# Patient Record
Sex: MALE | Race: BLACK OR AFRICAN AMERICAN | NOT HISPANIC OR LATINO | Employment: OTHER | URBAN - METROPOLITAN AREA
[De-identification: names, ages, dates, MRNs, and addresses within clinical notes are randomized per-mention and may not be internally consistent; named-entity substitution may affect disease eponyms.]

---

## 2017-10-31 ENCOUNTER — HISTORICAL (OUTPATIENT)
Dept: INTERNAL MEDICINE | Facility: CLINIC | Age: 62
End: 2017-10-31

## 2017-10-31 LAB
ALBUMIN SERPL-MCNC: 3.6 GM/DL (ref 3.4–5)
ALBUMIN/GLOB SERPL: 1 RATIO (ref 1–2)
ALP SERPL-CCNC: 83 UNIT/L (ref 45–117)
ALT SERPL-CCNC: 28 UNIT/L (ref 12–78)
AST SERPL-CCNC: 27 UNIT/L (ref 15–37)
BILIRUB SERPL-MCNC: 0.9 MG/DL (ref 0.2–1)
BILIRUBIN DIRECT+TOT PNL SERPL-MCNC: 0.2 MG/DL
BILIRUBIN DIRECT+TOT PNL SERPL-MCNC: 0.7 MG/DL
BUN SERPL-MCNC: 15 MG/DL (ref 7–18)
CALCIUM SERPL-MCNC: 8.7 MG/DL (ref 8.5–10.1)
CHLORIDE SERPL-SCNC: 106 MMOL/L (ref 98–107)
CO2 SERPL-SCNC: 32 MMOL/L (ref 21–32)
CREAT SERPL-MCNC: 0.9 MG/DL (ref 0.6–1.3)
GLOBULIN SER-MCNC: 3.8 GM/ML (ref 2.3–3.5)
GLUCOSE SERPL-MCNC: 92 MG/DL (ref 74–106)
POTASSIUM SERPL-SCNC: 3.2 MMOL/L (ref 3.5–5.1)
PROT SERPL-MCNC: 7.4 GM/DL (ref 6.4–8.2)
SODIUM SERPL-SCNC: 143 MMOL/L (ref 136–145)

## 2017-11-06 ENCOUNTER — HISTORICAL (OUTPATIENT)
Dept: INTERNAL MEDICINE | Facility: CLINIC | Age: 62
End: 2017-11-06

## 2017-11-06 LAB
COLOR STL: NORMAL
COLOR STL: NORMAL
CONSISTENCY STL: NORMAL
CONSISTENCY STL: NORMAL
HEMOCCULT SP1 STL QL: NEGATIVE
HEMOCCULT SP2 STL QL: NEGATIVE
POTASSIUM 24H UR-SCNC: 87 MMOL/24 HRS (ref 25–100)
POTASSIUM UR-SCNC: 30 MMOL/L

## 2017-11-07 LAB
COLOR STL: NORMAL
CONSISTENCY STL: NORMAL

## 2017-11-08 ENCOUNTER — HISTORICAL (OUTPATIENT)
Dept: CARDIOLOGY | Facility: CLINIC | Age: 62
End: 2017-11-08

## 2017-12-14 ENCOUNTER — HOSPITAL ENCOUNTER (OUTPATIENT)
Dept: MEDSURG UNIT | Facility: HOSPITAL | Age: 62
End: 2017-12-15
Attending: INTERNAL MEDICINE | Admitting: INTERNAL MEDICINE

## 2017-12-14 LAB
ABS NEUT (OLG): 2.98 X10(3)/MCL (ref 2.1–9.2)
ALBUMIN SERPL-MCNC: 3.6 GM/DL (ref 3.4–5)
ALBUMIN/GLOB SERPL: 1 RATIO (ref 1–2)
ALP SERPL-CCNC: 80 UNIT/L (ref 45–117)
ALT SERPL-CCNC: 30 UNIT/L (ref 12–78)
AST SERPL-CCNC: 23 UNIT/L (ref 15–37)
BASOPHILS # BLD AUTO: 0.05 X10(3)/MCL
BASOPHILS NFR BLD AUTO: 1 % (ref 0–1)
BILIRUB SERPL-MCNC: 0.8 MG/DL (ref 0.2–1)
BILIRUBIN DIRECT+TOT PNL SERPL-MCNC: 0.2 MG/DL
BILIRUBIN DIRECT+TOT PNL SERPL-MCNC: 0.6 MG/DL
BUN SERPL-MCNC: 17 MG/DL (ref 7–18)
CALCIUM SERPL-MCNC: 8.2 MG/DL (ref 8.5–10.1)
CHLORIDE SERPL-SCNC: 111 MMOL/L (ref 98–107)
CK MB SERPL-MCNC: 3.6 NG/ML (ref 1–3.6)
CK SERPL-CCNC: 126 UNIT/L (ref 39–308)
CO2 SERPL-SCNC: 25 MMOL/L (ref 21–32)
CREAT SERPL-MCNC: 1.1 MG/DL (ref 0.6–1.3)
EOSINOPHIL # BLD AUTO: 0.02 10*3/UL
EOSINOPHIL NFR BLD AUTO: 0 % (ref 0–5)
ERYTHROCYTE [DISTWIDTH] IN BLOOD BY AUTOMATED COUNT: 13.3 % (ref 11.5–14.5)
GLOBULIN SER-MCNC: 3.6 GM/ML (ref 2.3–3.5)
GLUCOSE SERPL-MCNC: 97 MG/DL (ref 74–106)
HCT VFR BLD AUTO: 43.5 % (ref 40–51)
HGB BLD-MCNC: 15.3 GM/DL (ref 13.5–17.5)
IMM GRANULOCYTES # BLD AUTO: 0.02 10*3/UL
IMM GRANULOCYTES NFR BLD AUTO: 0 %
INR PPP: 1.04 (ref 0.9–1.2)
LYMPHOCYTES # BLD AUTO: 1.98 X10(3)/MCL
LYMPHOCYTES NFR BLD AUTO: 35 % (ref 15–40)
MAGNESIUM SERPL-MCNC: 2 MG/DL (ref 1.8–2.4)
MCH RBC QN AUTO: 28.6 PG (ref 26–34)
MCHC RBC AUTO-ENTMCNC: 35.2 GM/DL (ref 31–37)
MCV RBC AUTO: 81.3 FL (ref 80–100)
MONOCYTES # BLD AUTO: 0.66 X10(3)/MCL
MONOCYTES NFR BLD AUTO: 12 % (ref 4–12)
NEUTROPHILS # BLD AUTO: 2.98 X10(3)/MCL
NEUTROPHILS NFR BLD AUTO: 52 X10(3)/MCL
PLATELET # BLD AUTO: 231 X10(3)/MCL (ref 130–400)
PMV BLD AUTO: 10.8 FL (ref 7.4–10.4)
POTASSIUM SERPL-SCNC: 3.4 MMOL/L (ref 3.5–5.1)
PROT SERPL-MCNC: 7.2 GM/DL (ref 6.4–8.2)
PROTHROMBIN TIME: 13.4 SECOND(S) (ref 11.9–14.4)
RBC # BLD AUTO: 5.35 X10(6)/MCL (ref 4.5–5.9)
SODIUM SERPL-SCNC: 143 MMOL/L (ref 136–145)
TROPONIN I SERPL-MCNC: 0.02 NG/ML (ref 0–0.05)
TSH SERPL-ACNC: 2.88 MIU/L (ref 0.36–3.74)
WBC # SPEC AUTO: 5.7 X10(3)/MCL (ref 4.5–11)

## 2017-12-15 LAB
ABS NEUT (OLG): 2.2 X10(3)/MCL (ref 2.1–9.2)
ALBUMIN SERPL-MCNC: 3.6 GM/DL (ref 3.4–5)
ALBUMIN/GLOB SERPL: 1 RATIO (ref 1–2)
ALP SERPL-CCNC: 80 UNIT/L (ref 45–117)
ALT SERPL-CCNC: 32 UNIT/L (ref 12–78)
AST SERPL-CCNC: 20 UNIT/L (ref 15–37)
BASOPHILS # BLD AUTO: 0.07 X10(3)/MCL
BASOPHILS NFR BLD AUTO: 2 % (ref 0–1)
BILIRUB SERPL-MCNC: 1 MG/DL (ref 0.2–1)
BILIRUBIN DIRECT+TOT PNL SERPL-MCNC: 0.3 MG/DL
BILIRUBIN DIRECT+TOT PNL SERPL-MCNC: 0.7 MG/DL
BUN SERPL-MCNC: 15 MG/DL (ref 7–18)
CALCIUM SERPL-MCNC: 8.5 MG/DL (ref 8.5–10.1)
CHLORIDE SERPL-SCNC: 106 MMOL/L (ref 98–107)
CHOLEST SERPL-MCNC: 70 MG/DL
CHOLEST/HDLC SERPL: 2.2 {RATIO} (ref 0–5)
CO2 SERPL-SCNC: 34 MMOL/L (ref 21–32)
CREAT SERPL-MCNC: 1 MG/DL (ref 0.6–1.3)
EOSINOPHIL # BLD AUTO: 0.05 10*3/UL
EOSINOPHIL NFR BLD AUTO: 1 % (ref 0–5)
ERYTHROCYTE [DISTWIDTH] IN BLOOD BY AUTOMATED COUNT: 13.5 % (ref 11.5–14.5)
GLOBULIN SER-MCNC: 3.6 GM/ML (ref 2.3–3.5)
GLUCOSE SERPL-MCNC: 87 MG/DL (ref 74–106)
HCT VFR BLD AUTO: 45.2 % (ref 40–51)
HDLC SERPL-MCNC: 32 MG/DL
HGB BLD-MCNC: 15.1 GM/DL (ref 13.5–17.5)
IMM GRANULOCYTES # BLD AUTO: 0.01 10*3/UL
IMM GRANULOCYTES NFR BLD AUTO: 0 %
LDLC SERPL CALC-MCNC: 26 MG/DL (ref 0–130)
LYMPHOCYTES # BLD AUTO: 1.99 X10(3)/MCL
LYMPHOCYTES NFR BLD AUTO: 41 % (ref 15–40)
MCH RBC QN AUTO: 27.9 PG (ref 26–34)
MCHC RBC AUTO-ENTMCNC: 33.4 GM/DL (ref 31–37)
MCV RBC AUTO: 83.5 FL (ref 80–100)
MONOCYTES # BLD AUTO: 0.49 X10(3)/MCL
MONOCYTES NFR BLD AUTO: 10 % (ref 4–12)
NEUTROPHILS # BLD AUTO: 2.2 X10(3)/MCL
NEUTROPHILS NFR BLD AUTO: 46 X10(3)/MCL
PLATELET # BLD AUTO: 207 X10(3)/MCL (ref 130–400)
PMV BLD AUTO: 10.9 FL (ref 7.4–10.4)
POTASSIUM SERPL-SCNC: 3.6 MMOL/L (ref 3.5–5.1)
PROT SERPL-MCNC: 7.2 GM/DL (ref 6.4–8.2)
RBC # BLD AUTO: 5.41 X10(6)/MCL (ref 4.5–5.9)
SODIUM SERPL-SCNC: 142 MMOL/L (ref 136–145)
TRIGL SERPL-MCNC: 62 MG/DL
VLDLC SERPL CALC-MCNC: 12 MG/DL
WBC # SPEC AUTO: 4.8 X10(3)/MCL (ref 4.5–11)

## 2018-01-16 ENCOUNTER — HISTORICAL (OUTPATIENT)
Dept: INTERNAL MEDICINE | Facility: CLINIC | Age: 63
End: 2018-01-16

## 2018-01-16 LAB
ABS NEUT (OLG): 3.28 X10(3)/MCL (ref 2.1–9.2)
ALBUMIN SERPL-MCNC: 4.3 GM/DL (ref 3.4–5)
ALBUMIN/GLOB SERPL: 1 RATIO (ref 1–2)
ALP SERPL-CCNC: 99 UNIT/L (ref 45–117)
ALT SERPL-CCNC: 26 UNIT/L (ref 12–78)
APPEARANCE, UA: CLEAR
AST SERPL-CCNC: 22 UNIT/L (ref 15–37)
BACTERIA #/AREA URNS AUTO: ABNORMAL /[HPF]
BASOPHILS # BLD AUTO: 0.05 X10(3)/MCL
BASOPHILS NFR BLD AUTO: 1 % (ref 0–1)
BILIRUB SERPL-MCNC: 1.1 MG/DL (ref 0.2–1)
BILIRUB UR QL STRIP: 0.5 MG/DL
BILIRUBIN DIRECT+TOT PNL SERPL-MCNC: 0.3 MG/DL
BILIRUBIN DIRECT+TOT PNL SERPL-MCNC: 0.8 MG/DL
BUN SERPL-MCNC: 8 MG/DL (ref 7–18)
CALCIUM SERPL-MCNC: 8.9 MG/DL (ref 8.5–10.1)
CHLORIDE SERPL-SCNC: 104 MMOL/L (ref 98–107)
CO2 SERPL-SCNC: 32 MMOL/L (ref 21–32)
COLOR UR: YELLOW
CREAT SERPL-MCNC: 0.8 MG/DL (ref 0.6–1.3)
EOSINOPHIL # BLD AUTO: 0.03 X10(3)/MCL
EOSINOPHIL NFR BLD AUTO: 1 % (ref 0–5)
ERYTHROCYTE [DISTWIDTH] IN BLOOD BY AUTOMATED COUNT: 14.2 % (ref 11.5–14.5)
GLOBULIN SER-MCNC: 4.2 GM/ML (ref 2.3–3.5)
GLUCOSE (UA): NORMAL
GLUCOSE SERPL-MCNC: 92 MG/DL (ref 74–106)
HCT VFR BLD AUTO: 43.9 % (ref 40–51)
HGB BLD-MCNC: 15.4 GM/DL (ref 13.5–17.5)
HGB UR QL STRIP: NEGATIVE
HYALINE CASTS #/AREA URNS LPF: ABNORMAL /[LPF]
IMM GRANULOCYTES # BLD AUTO: 0.02 10*3/UL
IMM GRANULOCYTES NFR BLD AUTO: 0 %
INR PPP: 0.98 (ref 0.9–1.2)
KETONES UR QL STRIP: NEGATIVE
LEUKOCYTE ESTERASE UR QL STRIP: NEGATIVE
LYMPHOCYTES # BLD AUTO: 1.6 X10(3)/MCL
LYMPHOCYTES NFR BLD AUTO: 30 % (ref 15–40)
MCH RBC QN AUTO: 28.7 PG (ref 26–34)
MCHC RBC AUTO-ENTMCNC: 35.1 GM/DL (ref 31–37)
MCV RBC AUTO: 81.8 FL (ref 80–100)
MONOCYTES # BLD AUTO: 0.43 X10(3)/MCL
MONOCYTES NFR BLD AUTO: 8 % (ref 4–12)
MUCOUS THREADS URNS QL MICRO: ABNORMAL
NEUTROPHILS # BLD AUTO: 3.28 X10(3)/MCL
NEUTROPHILS NFR BLD AUTO: 61 X10(3)/MCL
NITRITE UR QL STRIP: NEGATIVE
PH UR STRIP: 8 [PH] (ref 4.5–8)
PLATELET # BLD AUTO: 256 X10(3)/MCL (ref 130–400)
PMV BLD AUTO: 10.7 FL (ref 7.4–10.4)
POTASSIUM SERPL-SCNC: 2.9 MMOL/L (ref 3.5–5.1)
PROT SERPL-MCNC: 8.5 GM/DL (ref 6.4–8.2)
PROT UR QL STRIP: 70 MG/DL
PROTHROMBIN TIME: 12.8 SECOND(S) (ref 11.9–14.4)
RBC # BLD AUTO: 5.37 X10(6)/MCL (ref 4.5–5.9)
RBC #/AREA URNS AUTO: ABNORMAL /[HPF]
SODIUM SERPL-SCNC: 143 MMOL/L (ref 136–145)
SP GR UR STRIP: 1.02 (ref 1–1.03)
SQUAMOUS #/AREA URNS LPF: ABNORMAL /[LPF]
UROBILINOGEN UR STRIP-ACNC: 8 MG/DL
WBC # SPEC AUTO: 5.4 X10(3)/MCL (ref 4.5–11)
WBC #/AREA URNS AUTO: ABNORMAL /HPF

## 2018-01-18 LAB — FINAL CULTURE: NO GROWTH

## 2018-01-21 LAB
FINAL CULTURE: NORMAL
FINAL CULTURE: NORMAL

## 2018-01-25 ENCOUNTER — HISTORICAL (OUTPATIENT)
Dept: INTERNAL MEDICINE | Facility: CLINIC | Age: 63
End: 2018-01-25

## 2018-01-25 LAB
BUN SERPL-MCNC: 19 MG/DL (ref 7–18)
CALCIUM SERPL-MCNC: 9 MG/DL (ref 8.5–10.1)
CHLORIDE SERPL-SCNC: 102 MMOL/L (ref 98–107)
CO2 SERPL-SCNC: 30 MMOL/L (ref 21–32)
CREAT SERPL-MCNC: 0.9 MG/DL (ref 0.6–1.3)
GLUCOSE SERPL-MCNC: 86 MG/DL (ref 74–106)
POTASSIUM SERPL-SCNC: 3.1 MMOL/L (ref 3.5–5.1)
SODIUM SERPL-SCNC: 141 MMOL/L (ref 136–145)

## 2018-01-29 ENCOUNTER — HISTORICAL (OUTPATIENT)
Dept: RADIOLOGY | Facility: HOSPITAL | Age: 63
End: 2018-01-29

## 2018-03-19 ENCOUNTER — HISTORICAL (OUTPATIENT)
Dept: LAB | Facility: HOSPITAL | Age: 63
End: 2018-03-19

## 2018-03-19 LAB
BUN SERPL-MCNC: 16 MG/DL (ref 7–18)
CALCIUM SERPL-MCNC: 8.8 MG/DL (ref 8.5–10.1)
CHLORIDE SERPL-SCNC: 105 MMOL/L (ref 98–107)
CO2 SERPL-SCNC: 30 MMOL/L (ref 21–32)
CREAT SERPL-MCNC: 0.9 MG/DL (ref 0.6–1.3)
CREAT/UREA NIT SERPL: 18
GLUCOSE SERPL-MCNC: 89 MG/DL (ref 74–106)
POTASSIUM SERPL-SCNC: 4 MMOL/L (ref 3.5–5.1)
SODIUM SERPL-SCNC: 141 MMOL/L (ref 136–145)

## 2018-05-22 ENCOUNTER — HISTORICAL (OUTPATIENT)
Dept: ADMINISTRATIVE | Facility: HOSPITAL | Age: 63
End: 2018-05-22

## 2018-05-22 LAB
ABS NEUT (OLG): 3.82 X10(3)/MCL (ref 2.1–9.2)
ALBUMIN SERPL-MCNC: 3.8 GM/DL (ref 3.4–5)
ALBUMIN/GLOB SERPL: 1 RATIO (ref 1–2)
ALP SERPL-CCNC: 93 UNIT/L (ref 45–117)
ALT SERPL-CCNC: 33 UNIT/L (ref 12–78)
APTT PPP: 31.5 SECOND(S) (ref 23.3–37)
AST SERPL-CCNC: 24 UNIT/L (ref 15–37)
BASOPHILS # BLD AUTO: 0.06 X10(3)/MCL
BASOPHILS NFR BLD AUTO: 1 %
BILIRUB SERPL-MCNC: 0.9 MG/DL (ref 0.2–1)
BILIRUBIN DIRECT+TOT PNL SERPL-MCNC: 0.2 MG/DL
BILIRUBIN DIRECT+TOT PNL SERPL-MCNC: 0.7 MG/DL
BUN SERPL-MCNC: 16 MG/DL (ref 7–18)
CALCIUM SERPL-MCNC: 8.7 MG/DL (ref 8.5–10.1)
CHLORIDE SERPL-SCNC: 108 MMOL/L (ref 98–107)
CK MB SERPL-MCNC: 5 NG/ML (ref 1–3.6)
CK SERPL-CCNC: 271 UNIT/L (ref 39–308)
CO2 SERPL-SCNC: 28 MMOL/L (ref 21–32)
CREAT SERPL-MCNC: 1 MG/DL (ref 0.6–1.3)
EOSINOPHIL # BLD AUTO: 0.05 X10(3)/MCL
EOSINOPHIL NFR BLD AUTO: 1 %
ERYTHROCYTE [DISTWIDTH] IN BLOOD BY AUTOMATED COUNT: 13.2 % (ref 11.5–14.5)
GLOBULIN SER-MCNC: 3.6 GM/ML (ref 2.3–3.5)
GLUCOSE SERPL-MCNC: 83 MG/DL (ref 74–106)
HCT VFR BLD AUTO: 43.9 % (ref 40–51)
HGB BLD-MCNC: 15 GM/DL (ref 13.5–17.5)
IMM GRANULOCYTES # BLD AUTO: 0.02 10*3/UL
IMM GRANULOCYTES NFR BLD AUTO: 0 %
INR PPP: 1.13 (ref 0.9–1.2)
LYMPHOCYTES # BLD AUTO: 2.05 X10(3)/MCL
LYMPHOCYTES NFR BLD AUTO: 31 % (ref 13–40)
MCH RBC QN AUTO: 28.8 PG (ref 26–34)
MCHC RBC AUTO-ENTMCNC: 34.2 GM/DL (ref 31–37)
MCV RBC AUTO: 84.4 FL (ref 80–100)
MONOCYTES # BLD AUTO: 0.59 X10(3)/MCL
MONOCYTES NFR BLD AUTO: 9 % (ref 4–12)
NEUTROPHILS # BLD AUTO: 3.82 X10(3)/MCL
NEUTROPHILS NFR BLD AUTO: 58 X10(3)/MCL
PLATELET # BLD AUTO: 236 X10(3)/MCL (ref 130–400)
PMV BLD AUTO: 9.9 FL (ref 7.4–10.4)
POTASSIUM SERPL-SCNC: 4.2 MMOL/L (ref 3.5–5.1)
PROT SERPL-MCNC: 7.4 GM/DL (ref 6.4–8.2)
PROTHROMBIN TIME: 13.8 SECOND(S) (ref 11.9–14.4)
RBC # BLD AUTO: 5.2 X10(6)/MCL (ref 4.5–5.9)
SODIUM SERPL-SCNC: 141 MMOL/L (ref 136–145)
TROPONIN I SERPL-MCNC: 0.09 NG/ML (ref 0–0.05)
WBC # SPEC AUTO: 6.6 X10(3)/MCL (ref 4.5–11)

## 2018-12-10 ENCOUNTER — HISTORICAL (OUTPATIENT)
Dept: ADMINISTRATIVE | Facility: HOSPITAL | Age: 63
End: 2018-12-10

## 2018-12-10 LAB
ABS NEUT (OLG): 5.31 X10(3)/MCL (ref 2.1–9.2)
ALBUMIN SERPL-MCNC: 4.4 GM/DL (ref 3.4–5)
ALBUMIN/GLOB SERPL: 1 RATIO (ref 1–2)
ALP SERPL-CCNC: 98 UNIT/L (ref 45–117)
ALT SERPL-CCNC: 24 UNIT/L (ref 12–78)
AST SERPL-CCNC: 17 UNIT/L (ref 15–37)
BASOPHILS # BLD AUTO: 0.04 X10(3)/MCL
BASOPHILS NFR BLD AUTO: 0 %
BILIRUB SERPL-MCNC: 1 MG/DL (ref 0.2–1)
BILIRUBIN DIRECT+TOT PNL SERPL-MCNC: 0.3 MG/DL
BILIRUBIN DIRECT+TOT PNL SERPL-MCNC: 0.7 MG/DL
BUN SERPL-MCNC: 19 MG/DL (ref 7–18)
CALCIUM SERPL-MCNC: 9.2 MG/DL (ref 8.5–10.1)
CHLORIDE SERPL-SCNC: 107 MMOL/L (ref 98–107)
CO2 SERPL-SCNC: 27 MMOL/L (ref 21–32)
CREAT SERPL-MCNC: 1.2 MG/DL (ref 0.6–1.3)
EOSINOPHIL # BLD AUTO: 0.22 10*3/UL
EOSINOPHIL NFR BLD AUTO: 3 %
ERYTHROCYTE [DISTWIDTH] IN BLOOD BY AUTOMATED COUNT: 13.2 % (ref 11.5–14.5)
GLOBULIN SER-MCNC: 4.2 GM/ML (ref 2.3–3.5)
GLUCOSE SERPL-MCNC: 102 MG/DL (ref 74–106)
HCT VFR BLD AUTO: 43.3 % (ref 40–51)
HGB BLD-MCNC: 14.1 GM/DL (ref 13.5–17.5)
IMM GRANULOCYTES # BLD AUTO: 0.03 10*3/UL
IMM GRANULOCYTES NFR BLD AUTO: 0 %
LYMPHOCYTES # BLD AUTO: 1.92 X10(3)/MCL
LYMPHOCYTES NFR BLD AUTO: 24 % (ref 13–40)
MCH RBC QN AUTO: 27.8 PG (ref 26–34)
MCHC RBC AUTO-ENTMCNC: 32.6 GM/DL (ref 31–37)
MCV RBC AUTO: 85.4 FL (ref 80–100)
MONOCYTES # BLD AUTO: 0.63 X10(3)/MCL
MONOCYTES NFR BLD AUTO: 8 % (ref 4–12)
NEUTROPHILS # BLD AUTO: 5.31 X10(3)/MCL
NEUTROPHILS NFR BLD AUTO: 65 X10(3)/MCL
PLATELET # BLD AUTO: 287 X10(3)/MCL (ref 130–400)
PMV BLD AUTO: 9.8 FL (ref 7.4–10.4)
POTASSIUM SERPL-SCNC: 3.7 MMOL/L (ref 3.5–5.1)
PROT SERPL-MCNC: 8.6 GM/DL (ref 6.4–8.2)
RBC # BLD AUTO: 5.07 X10(6)/MCL (ref 4.5–5.9)
SODIUM SERPL-SCNC: 140 MMOL/L (ref 136–145)
WBC # SPEC AUTO: 8.2 X10(3)/MCL (ref 4.5–11)

## 2018-12-12 ENCOUNTER — HISTORICAL (OUTPATIENT)
Dept: HEMATOLOGY/ONCOLOGY | Facility: CLINIC | Age: 63
End: 2018-12-12

## 2018-12-26 ENCOUNTER — HISTORICAL (OUTPATIENT)
Dept: RADIOLOGY | Facility: HOSPITAL | Age: 63
End: 2018-12-26

## 2018-12-27 ENCOUNTER — HISTORICAL (OUTPATIENT)
Dept: INTERNAL MEDICINE | Facility: CLINIC | Age: 63
End: 2018-12-27

## 2018-12-27 ENCOUNTER — HISTORICAL (OUTPATIENT)
Dept: ENDOSCOPY | Facility: HOSPITAL | Age: 63
End: 2018-12-27

## 2018-12-27 LAB
BUN SERPL-MCNC: 14 MG/DL (ref 7–18)
CALCIUM SERPL-MCNC: 8.8 MG/DL (ref 8.5–10.1)
CHLORIDE SERPL-SCNC: 106 MMOL/L (ref 98–107)
CO2 SERPL-SCNC: 27 MMOL/L (ref 21–32)
CRC RECOMMENDATION EXT: NORMAL
CREAT SERPL-MCNC: 1.4 MG/DL (ref 0.6–1.3)
CREAT/UREA NIT SERPL: 10
EST. AVERAGE GLUCOSE BLD GHB EST-MCNC: 111 MG/DL
GLUCOSE SERPL-MCNC: 105 MG/DL (ref 74–106)
HBA1C MFR BLD: 5.5 % (ref 4.2–6.3)
MAGNESIUM SERPL-MCNC: 2.8 MG/DL (ref 1.8–2.4)
PHOSPHATE SERPL-MCNC: 3.4 MG/DL (ref 2.5–4.9)
POTASSIUM SERPL-SCNC: 3.4 MMOL/L (ref 3.5–5.1)
SODIUM SERPL-SCNC: 140 MMOL/L (ref 136–145)

## 2019-01-03 DIAGNOSIS — C7A.8 NEURO-ENDOCRINE CARCINOMA: Primary | ICD-10-CM

## 2019-01-31 ENCOUNTER — HISTORICAL (OUTPATIENT)
Dept: ADMINISTRATIVE | Facility: HOSPITAL | Age: 64
End: 2019-01-31

## 2019-01-31 LAB
ABS NEUT (OLG): 3.51 X10(3)/MCL (ref 2.1–9.2)
ALBUMIN SERPL-MCNC: 4.2 GM/DL (ref 3.4–5)
ALBUMIN/GLOB SERPL: 1 RATIO (ref 1.1–2)
ALP SERPL-CCNC: 92 UNIT/L (ref 45–117)
ALT SERPL-CCNC: 29 UNIT/L (ref 12–78)
AST SERPL-CCNC: 17 UNIT/L (ref 15–37)
BASOPHILS # BLD AUTO: 0.06 X10(3)/MCL
BASOPHILS NFR BLD AUTO: 1 %
BILIRUB SERPL-MCNC: 1 MG/DL (ref 0.2–1)
BILIRUBIN DIRECT+TOT PNL SERPL-MCNC: 0.3 MG/DL
BILIRUBIN DIRECT+TOT PNL SERPL-MCNC: 0.7 MG/DL
BUN SERPL-MCNC: 15 MG/DL (ref 7–18)
CALCIUM SERPL-MCNC: 8.8 MG/DL (ref 8.5–10.1)
CHLORIDE SERPL-SCNC: 105 MMOL/L (ref 98–107)
CO2 SERPL-SCNC: 29 MMOL/L (ref 21–32)
CREAT SERPL-MCNC: 1.1 MG/DL (ref 0.6–1.3)
EOSINOPHIL # BLD AUTO: 0.19 X10(3)/MCL
EOSINOPHIL NFR BLD AUTO: 3 %
ERYTHROCYTE [DISTWIDTH] IN BLOOD BY AUTOMATED COUNT: 13.9 % (ref 11.5–14.5)
GLOBULIN SER-MCNC: 4.2 GM/ML (ref 2.3–3.5)
GLUCOSE SERPL-MCNC: 110 MG/DL (ref 74–106)
HCT VFR BLD AUTO: 42 % (ref 40–51)
HGB BLD-MCNC: 13.9 GM/DL (ref 13.5–17.5)
IMM GRANULOCYTES # BLD AUTO: 0.02 10*3/UL
IMM GRANULOCYTES NFR BLD AUTO: 0 %
LYMPHOCYTES # BLD AUTO: 1.6 X10(3)/MCL
LYMPHOCYTES NFR BLD AUTO: 27 % (ref 13–40)
MCH RBC QN AUTO: 27.8 PG (ref 26–34)
MCHC RBC AUTO-ENTMCNC: 33.1 GM/DL (ref 31–37)
MCV RBC AUTO: 84 FL (ref 80–100)
MONOCYTES # BLD AUTO: 0.51 X10(3)/MCL
MONOCYTES NFR BLD AUTO: 9 % (ref 4–12)
NEUTROPHILS # BLD AUTO: 3.51 X10(3)/MCL
NEUTROPHILS NFR BLD AUTO: 60 X10(3)/MCL
PLATELET # BLD AUTO: 267 X10(3)/MCL (ref 130–400)
PMV BLD AUTO: 9.8 FL (ref 7.4–10.4)
POTASSIUM SERPL-SCNC: 3.6 MMOL/L (ref 3.5–5.1)
PROT SERPL-MCNC: 8.4 GM/DL (ref 6.4–8.2)
RBC # BLD AUTO: 5 X10(6)/MCL (ref 4.5–5.9)
SODIUM SERPL-SCNC: 139 MMOL/L (ref 136–145)
WBC # SPEC AUTO: 5.9 X10(3)/MCL (ref 4.5–11)

## 2019-02-15 ENCOUNTER — HOSPITAL ENCOUNTER (OUTPATIENT)
Dept: RADIOLOGY | Facility: HOSPITAL | Age: 64
Discharge: HOME | End: 2019-02-15
Attending: INTERNAL MEDICINE
Payer: MEDICAID

## 2019-02-15 DIAGNOSIS — C7A.8 NEURO-ENDOCRINE CARCINOMA: ICD-10-CM

## 2019-02-15 PROCEDURE — 78815 NM PET 68GA DOTATATE WHOLE BODY: ICD-10-PCS | Mod: 26,PI,, | Performed by: RADIOLOGY

## 2019-02-15 PROCEDURE — 78815 PET IMAGE W/CT SKULL-THIGH: CPT | Mod: 26,PI,, | Performed by: RADIOLOGY

## 2019-02-15 PROCEDURE — A9587 GALLIUM GA-68: HCPCS | Mod: TB

## 2019-02-15 PROCEDURE — 78815 PET IMAGE W/CT SKULL-THIGH: CPT | Mod: TC

## 2019-02-28 ENCOUNTER — HISTORICAL (OUTPATIENT)
Dept: ADMINISTRATIVE | Facility: HOSPITAL | Age: 64
End: 2019-02-28

## 2019-02-28 LAB
ABS NEUT (OLG): 3.84 X10(3)/MCL (ref 2.1–9.2)
ALBUMIN SERPL-MCNC: 4.2 GM/DL (ref 3.4–5)
ALBUMIN/GLOB SERPL: 1 RATIO (ref 1.1–2)
ALP SERPL-CCNC: 92 UNIT/L (ref 45–117)
ALT SERPL-CCNC: 28 UNIT/L (ref 12–78)
AST SERPL-CCNC: 17 UNIT/L (ref 15–37)
BASOPHILS # BLD AUTO: 0.06 X10(3)/MCL
BASOPHILS NFR BLD AUTO: 1 %
BILIRUB SERPL-MCNC: 1.1 MG/DL (ref 0.2–1)
BILIRUBIN DIRECT+TOT PNL SERPL-MCNC: 0.3 MG/DL
BILIRUBIN DIRECT+TOT PNL SERPL-MCNC: 0.8 MG/DL
BUN SERPL-MCNC: 17 MG/DL (ref 7–18)
CALCIUM SERPL-MCNC: 9 MG/DL (ref 8.5–10.1)
CHLORIDE SERPL-SCNC: 106 MMOL/L (ref 98–107)
CO2 SERPL-SCNC: 30 MMOL/L (ref 21–32)
CREAT SERPL-MCNC: 1.3 MG/DL (ref 0.6–1.3)
EOSINOPHIL # BLD AUTO: 0.11 X10(3)/MCL
EOSINOPHIL NFR BLD AUTO: 2 %
ERYTHROCYTE [DISTWIDTH] IN BLOOD BY AUTOMATED COUNT: 14.5 % (ref 11.5–14.5)
GLOBULIN SER-MCNC: 4 GM/ML (ref 2.3–3.5)
GLUCOSE SERPL-MCNC: 103 MG/DL (ref 74–106)
HCT VFR BLD AUTO: 43 % (ref 40–51)
HGB BLD-MCNC: 14.5 GM/DL (ref 13.5–17.5)
IMM GRANULOCYTES # BLD AUTO: 0.01 10*3/UL
IMM GRANULOCYTES NFR BLD AUTO: 0 %
LYMPHOCYTES # BLD AUTO: 1.79 X10(3)/MCL
LYMPHOCYTES NFR BLD AUTO: 28 % (ref 13–40)
MCH RBC QN AUTO: 28 PG (ref 26–34)
MCHC RBC AUTO-ENTMCNC: 33.7 GM/DL (ref 31–37)
MCV RBC AUTO: 83.2 FL (ref 80–100)
MONOCYTES # BLD AUTO: 0.48 X10(3)/MCL
MONOCYTES NFR BLD AUTO: 8 % (ref 4–12)
NEUTROPHILS # BLD AUTO: 3.84 X10(3)/MCL
NEUTROPHILS NFR BLD AUTO: 61 X10(3)/MCL
PLATELET # BLD AUTO: 300 X10(3)/MCL (ref 130–400)
PMV BLD AUTO: 10 FL (ref 7.4–10.4)
POTASSIUM SERPL-SCNC: 3.3 MMOL/L (ref 3.5–5.1)
PROT SERPL-MCNC: 8.2 GM/DL (ref 6.4–8.2)
RBC # BLD AUTO: 5.17 X10(6)/MCL (ref 4.5–5.9)
SODIUM SERPL-SCNC: 141 MMOL/L (ref 136–145)
WBC # SPEC AUTO: 6.3 X10(3)/MCL (ref 4.5–11)

## 2019-05-26 ENCOUNTER — HOSPITAL ENCOUNTER (OUTPATIENT)
Dept: MEDSURG UNIT | Facility: HOSPITAL | Age: 64
End: 2019-05-29
Attending: SURGERY | Admitting: SURGERY

## 2019-05-26 LAB
ABS NEUT (OLG): 4.47 X10(3)/MCL (ref 2.1–9.2)
ALBUMIN SERPL-MCNC: 4.1 GM/DL (ref 3.4–5)
ALBUMIN/GLOB SERPL: 1 RATIO (ref 1.1–2)
ALP SERPL-CCNC: 94 UNIT/L (ref 45–117)
ALT SERPL-CCNC: 45 UNIT/L (ref 12–78)
AMYLASE SERPL-CCNC: 67 UNIT/L (ref 25–115)
APPEARANCE, UA: CLEAR
AST SERPL-CCNC: 31 UNIT/L (ref 15–37)
BACTERIA #/AREA URNS AUTO: ABNORMAL /[HPF]
BASOPHILS # BLD AUTO: 0.07 X10(3)/MCL
BASOPHILS NFR BLD AUTO: 1 %
BILIRUB SERPL-MCNC: 1.3 MG/DL (ref 0.2–1)
BILIRUB UR QL STRIP: NEGATIVE
BILIRUBIN DIRECT+TOT PNL SERPL-MCNC: 0.3 MG/DL
BILIRUBIN DIRECT+TOT PNL SERPL-MCNC: 1 MG/DL
BUN SERPL-MCNC: 14 MG/DL (ref 7–18)
CALCIUM SERPL-MCNC: 8.7 MG/DL (ref 8.5–10.1)
CHLORIDE SERPL-SCNC: 108 MMOL/L (ref 98–107)
CO2 SERPL-SCNC: 29 MMOL/L (ref 21–32)
COLOR UR: NORMAL
CREAT SERPL-MCNC: 1.1 MG/DL (ref 0.6–1.3)
EOSINOPHIL # BLD AUTO: 0.12 10*3/UL
EOSINOPHIL NFR BLD AUTO: 2 %
ERYTHROCYTE [DISTWIDTH] IN BLOOD BY AUTOMATED COUNT: 13.8 % (ref 11.5–14.5)
GLOBULIN SER-MCNC: 4 GM/ML (ref 2.3–3.5)
GLUCOSE (UA): NORMAL
GLUCOSE SERPL-MCNC: 115 MG/DL (ref 74–106)
HCT VFR BLD AUTO: 45.4 % (ref 40–51)
HGB BLD-MCNC: 15.3 GM/DL (ref 13.5–17.5)
HGB UR QL STRIP: NEGATIVE
HYALINE CASTS #/AREA URNS LPF: ABNORMAL /[LPF]
IMM GRANULOCYTES # BLD AUTO: 0.04 10*3/UL
IMM GRANULOCYTES NFR BLD AUTO: 0 %
KETONES UR QL STRIP: NEGATIVE
LEUKOCYTE ESTERASE UR QL STRIP: NEGATIVE
LIPASE SERPL-CCNC: 110 UNIT/L (ref 73–393)
LYMPHOCYTES # BLD AUTO: 2.23 X10(3)/MCL
LYMPHOCYTES NFR BLD AUTO: 30 % (ref 13–40)
MCH RBC QN AUTO: 29 PG (ref 26–34)
MCHC RBC AUTO-ENTMCNC: 33.7 GM/DL (ref 31–37)
MCV RBC AUTO: 86.1 FL (ref 80–100)
MONOCYTES # BLD AUTO: 0.64 X10(3)/MCL
MONOCYTES NFR BLD AUTO: 8 % (ref 4–12)
NEUTROPHILS # BLD AUTO: 4.47 X10(3)/MCL
NEUTROPHILS NFR BLD AUTO: 59 X10(3)/MCL
NITRITE UR QL STRIP: NEGATIVE
PH UR STRIP: 6.5 [PH] (ref 4.5–8)
PLATELET # BLD AUTO: 250 X10(3)/MCL (ref 130–400)
PMV BLD AUTO: 10 FL (ref 7.4–10.4)
POTASSIUM SERPL-SCNC: 3.5 MMOL/L (ref 3.5–5.1)
PROT SERPL-MCNC: 8.1 GM/DL (ref 6.4–8.2)
PROT UR QL STRIP: NEGATIVE
RBC # BLD AUTO: 5.27 X10(6)/MCL (ref 4.5–5.9)
RBC #/AREA URNS AUTO: ABNORMAL /[HPF]
SODIUM SERPL-SCNC: 141 MMOL/L (ref 136–145)
SP GR UR STRIP: 1.03 (ref 1–1.03)
SQUAMOUS #/AREA URNS LPF: ABNORMAL /[LPF]
UROBILINOGEN UR STRIP-ACNC: NORMAL
WBC # SPEC AUTO: 7.6 X10(3)/MCL (ref 4.5–11)
WBC #/AREA URNS AUTO: ABNORMAL /HPF

## 2019-05-27 LAB
ABS NEUT (OLG): 5.02 X10(3)/MCL (ref 2.1–9.2)
BASOPHILS # BLD AUTO: 0.05 X10(3)/MCL
BASOPHILS NFR BLD AUTO: 1 %
BUN SERPL-MCNC: 12 MG/DL (ref 7–18)
CALCIUM SERPL-MCNC: 8.2 MG/DL (ref 8.5–10.1)
CHLORIDE SERPL-SCNC: 107 MMOL/L (ref 98–107)
CO2 SERPL-SCNC: 27 MMOL/L (ref 21–32)
CREAT SERPL-MCNC: 0.8 MG/DL (ref 0.6–1.3)
CREAT/UREA NIT SERPL: 15
EOSINOPHIL # BLD AUTO: 0.07 10*3/UL
EOSINOPHIL NFR BLD AUTO: 1 %
ERYTHROCYTE [DISTWIDTH] IN BLOOD BY AUTOMATED COUNT: 14.1 % (ref 11.5–14.5)
GLUCOSE SERPL-MCNC: 96 MG/DL (ref 74–106)
HCT VFR BLD AUTO: 43.1 % (ref 40–51)
HGB BLD-MCNC: 14.6 GM/DL (ref 13.5–17.5)
IMM GRANULOCYTES # BLD AUTO: 0.03 10*3/UL
IMM GRANULOCYTES NFR BLD AUTO: 0 %
LYMPHOCYTES # BLD AUTO: 2.08 X10(3)/MCL
LYMPHOCYTES NFR BLD AUTO: 26 % (ref 13–40)
MCH RBC QN AUTO: 28.9 PG (ref 26–34)
MCHC RBC AUTO-ENTMCNC: 33.9 GM/DL (ref 31–37)
MCV RBC AUTO: 85.3 FL (ref 80–100)
MONOCYTES # BLD AUTO: 0.74 X10(3)/MCL
MONOCYTES NFR BLD AUTO: 9 % (ref 4–12)
NEUTROPHILS # BLD AUTO: 5.02 X10(3)/MCL
NEUTROPHILS NFR BLD AUTO: 63 X10(3)/MCL
PLATELET # BLD AUTO: 252 X10(3)/MCL (ref 130–400)
PMV BLD AUTO: 10.1 FL (ref 7.4–10.4)
POTASSIUM SERPL-SCNC: 3.4 MMOL/L (ref 3.5–5.1)
RBC # BLD AUTO: 5.05 X10(6)/MCL (ref 4.5–5.9)
SODIUM SERPL-SCNC: 142 MMOL/L (ref 136–145)
WBC # SPEC AUTO: 8 X10(3)/MCL (ref 4.5–11)

## 2019-05-28 LAB
ABS NEUT (OLG): 6.06 X10(3)/MCL (ref 2.1–9.2)
BASOPHILS # BLD AUTO: 0.05 X10(3)/MCL
BASOPHILS NFR BLD AUTO: 1 %
BUN SERPL-MCNC: 13 MG/DL (ref 7–18)
CALCIUM SERPL-MCNC: 8.3 MG/DL (ref 8.5–10.1)
CHLORIDE SERPL-SCNC: 107 MMOL/L (ref 98–107)
CO2 SERPL-SCNC: 27 MMOL/L (ref 21–32)
CREAT SERPL-MCNC: 0.9 MG/DL (ref 0.6–1.3)
CREAT/UREA NIT SERPL: 14
EOSINOPHIL # BLD AUTO: 0.12 10*3/UL
EOSINOPHIL NFR BLD AUTO: 1 %
ERYTHROCYTE [DISTWIDTH] IN BLOOD BY AUTOMATED COUNT: 13.7 % (ref 11.5–14.5)
GLUCOSE SERPL-MCNC: 82 MG/DL (ref 74–106)
HCT VFR BLD AUTO: 45.8 % (ref 40–51)
HGB BLD-MCNC: 15.4 GM/DL (ref 13.5–17.5)
IMM GRANULOCYTES # BLD AUTO: 0.03 10*3/UL
IMM GRANULOCYTES NFR BLD AUTO: 0 %
LYMPHOCYTES # BLD AUTO: 1.59 X10(3)/MCL
LYMPHOCYTES NFR BLD AUTO: 18 % (ref 13–40)
MAGNESIUM SERPL-MCNC: 2.1 MG/DL (ref 1.6–2.6)
MCH RBC QN AUTO: 28.5 PG (ref 26–34)
MCHC RBC AUTO-ENTMCNC: 33.6 GM/DL (ref 31–37)
MCV RBC AUTO: 84.8 FL (ref 80–100)
MONOCYTES # BLD AUTO: 0.74 X10(3)/MCL
MONOCYTES NFR BLD AUTO: 9 % (ref 4–12)
NEUTROPHILS # BLD AUTO: 6.06 X10(3)/MCL
NEUTROPHILS NFR BLD AUTO: 71 X10(3)/MCL
PHOSPHATE SERPL-MCNC: 3.1 MG/DL (ref 2.5–4.9)
PLATELET # BLD AUTO: 242 X10(3)/MCL (ref 130–400)
PMV BLD AUTO: 9.5 FL (ref 7.4–10.4)
POTASSIUM SERPL-SCNC: 3.2 MMOL/L (ref 3.5–5.1)
RBC # BLD AUTO: 5.4 X10(6)/MCL (ref 4.5–5.9)
SODIUM SERPL-SCNC: 142 MMOL/L (ref 136–145)
WBC # SPEC AUTO: 8.6 X10(3)/MCL (ref 4.5–11)

## 2019-05-29 LAB
ABS NEUT (OLG): 4.58 X10(3)/MCL (ref 2.1–9.2)
BASOPHILS # BLD AUTO: 0.07 X10(3)/MCL
BASOPHILS NFR BLD AUTO: 1 %
BUN SERPL-MCNC: 9 MG/DL (ref 7–18)
CALCIUM SERPL-MCNC: 8.1 MG/DL (ref 8.5–10.1)
CHLORIDE SERPL-SCNC: 109 MMOL/L (ref 98–107)
CO2 SERPL-SCNC: 32 MMOL/L (ref 21–32)
CREAT SERPL-MCNC: 0.9 MG/DL (ref 0.6–1.3)
CREAT/UREA NIT SERPL: 10
EOSINOPHIL # BLD AUTO: 0.18 X10(3)/MCL
EOSINOPHIL NFR BLD AUTO: 2 %
ERYTHROCYTE [DISTWIDTH] IN BLOOD BY AUTOMATED COUNT: 13.9 % (ref 11.5–14.5)
GLUCOSE SERPL-MCNC: 92 MG/DL (ref 74–106)
HCT VFR BLD AUTO: 43.7 % (ref 40–51)
HGB BLD-MCNC: 14.9 GM/DL (ref 13.5–17.5)
IMM GRANULOCYTES # BLD AUTO: 0.02 10*3/UL
IMM GRANULOCYTES NFR BLD AUTO: 0 %
LYMPHOCYTES # BLD AUTO: 1.76 X10(3)/MCL
LYMPHOCYTES NFR BLD AUTO: 24 % (ref 13–40)
MAGNESIUM SERPL-MCNC: 2.1 MG/DL (ref 1.6–2.6)
MCH RBC QN AUTO: 29.4 PG (ref 26–34)
MCHC RBC AUTO-ENTMCNC: 34.1 GM/DL (ref 31–37)
MCV RBC AUTO: 86.2 FL (ref 80–100)
MONOCYTES # BLD AUTO: 0.72 X10(3)/MCL
MONOCYTES NFR BLD AUTO: 10 % (ref 4–12)
NEUTROPHILS # BLD AUTO: 4.58 X10(3)/MCL
NEUTROPHILS NFR BLD AUTO: 62 X10(3)/MCL
PHOSPHATE SERPL-MCNC: 2.7 MG/DL (ref 2.5–4.9)
PLATELET # BLD AUTO: 243 X10(3)/MCL (ref 130–400)
PMV BLD AUTO: 10.1 FL (ref 7.4–10.4)
POTASSIUM SERPL-SCNC: 3.3 MMOL/L (ref 3.5–5.1)
RBC # BLD AUTO: 5.07 X10(6)/MCL (ref 4.5–5.9)
SODIUM SERPL-SCNC: 141 MMOL/L (ref 136–145)
WBC # SPEC AUTO: 7.3 X10(3)/MCL (ref 4.5–11)

## 2019-06-21 ENCOUNTER — TELEPHONE (OUTPATIENT)
Dept: NEUROLOGY | Facility: HOSPITAL | Age: 64
End: 2019-06-21

## 2019-06-21 NOTE — TELEPHONE ENCOUNTER
----- Message from Laureen Landis, RN sent at 2019  6:24 PM CDT -----  Dr. Delio Ruiz (2 days ago, 12:35 PM)         He called to speak with Fatoumata or another nurse in regard to getting a patient scheduled with Dr. Dukes.  He says he talked to the doctor and he told him to call back with the information.     He said he will relay it all to one of you directly.  The patient is Delio Daniel,  57.     Please call him at [phone] to discuss today.

## 2019-06-24 ENCOUNTER — HISTORICAL (OUTPATIENT)
Dept: RADIOLOGY | Facility: HOSPITAL | Age: 64
End: 2019-06-24

## 2019-08-01 ENCOUNTER — HISTORICAL (OUTPATIENT)
Dept: ADMINISTRATIVE | Facility: HOSPITAL | Age: 64
End: 2019-08-01

## 2019-08-01 LAB
ABS NEUT (OLG): 3.78 X10(3)/MCL (ref 2.1–9.2)
ALBUMIN SERPL-MCNC: 4.6 GM/DL (ref 3.4–5)
ALBUMIN/GLOB SERPL: 1.3 RATIO (ref 1.1–2)
ALP SERPL-CCNC: 83 UNIT/L (ref 45–117)
ALT SERPL-CCNC: 40 UNIT/L (ref 12–78)
AST SERPL-CCNC: 24 UNIT/L (ref 15–37)
BASOPHILS # BLD AUTO: 0.09 X10(3)/MCL
BASOPHILS NFR BLD AUTO: 1 %
BILIRUB SERPL-MCNC: 1.4 MG/DL (ref 0.2–1)
BILIRUBIN DIRECT+TOT PNL SERPL-MCNC: 0.3 MG/DL
BILIRUBIN DIRECT+TOT PNL SERPL-MCNC: 1.1 MG/DL
BUN SERPL-MCNC: 19 MG/DL (ref 7–18)
CALCIUM SERPL-MCNC: 8.8 MG/DL (ref 8.5–10.1)
CHLORIDE SERPL-SCNC: 106 MMOL/L (ref 98–107)
CO2 SERPL-SCNC: 28 MMOL/L (ref 21–32)
CREAT SERPL-MCNC: 1.3 MG/DL (ref 0.6–1.3)
EOSINOPHIL # BLD AUTO: 0.14 X10(3)/MCL
EOSINOPHIL NFR BLD AUTO: 2 %
ERYTHROCYTE [DISTWIDTH] IN BLOOD BY AUTOMATED COUNT: 13.5 % (ref 11.5–14.5)
GLOBULIN SER-MCNC: 3.6 GM/ML (ref 2.3–3.5)
GLUCOSE SERPL-MCNC: 112 MG/DL (ref 74–106)
HCT VFR BLD AUTO: 47.8 % (ref 40–51)
HGB BLD-MCNC: 15.4 GM/DL (ref 13.5–17.5)
IMM GRANULOCYTES # BLD AUTO: 0.07 10*3/UL
IMM GRANULOCYTES NFR BLD AUTO: 1 %
LYMPHOCYTES # BLD AUTO: 2.13 X10(3)/MCL
LYMPHOCYTES NFR BLD AUTO: 31 % (ref 13–40)
MCH RBC QN AUTO: 28.7 PG (ref 26–34)
MCHC RBC AUTO-ENTMCNC: 32.2 GM/DL (ref 31–37)
MCV RBC AUTO: 89 FL (ref 80–100)
MONOCYTES # BLD AUTO: 0.6 X10(3)/MCL
MONOCYTES NFR BLD AUTO: 9 % (ref 0–24)
NEUTROPHILS # BLD AUTO: 3.78 X10(3)/MCL
NEUTROPHILS NFR BLD AUTO: 56 X10(3)/MCL
PLATELET # BLD AUTO: 268 X10(3)/MCL (ref 130–400)
PMV BLD AUTO: 9.5 FL (ref 7.4–10.4)
POTASSIUM SERPL-SCNC: 3.3 MMOL/L (ref 3.5–5.1)
PROT SERPL-MCNC: 8.2 GM/DL (ref 6.4–8.2)
RBC # BLD AUTO: 5.37 X10(6)/MCL (ref 4.5–5.9)
SODIUM SERPL-SCNC: 141 MMOL/L (ref 136–145)
WBC # SPEC AUTO: 6.8 X10(3)/MCL (ref 4.5–11)

## 2019-09-23 ENCOUNTER — HISTORICAL (OUTPATIENT)
Dept: ADMINISTRATIVE | Facility: HOSPITAL | Age: 64
End: 2019-09-23

## 2019-09-23 LAB
ALBUMIN SERPL-MCNC: 3.7 GM/DL (ref 3.4–5)
ALBUMIN/GLOB SERPL: 0.9 RATIO (ref 1.1–2)
ALP SERPL-CCNC: 83 UNIT/L (ref 45–117)
ALT SERPL-CCNC: 34 UNIT/L (ref 12–78)
AST SERPL-CCNC: 22 UNIT/L (ref 15–37)
BILIRUB SERPL-MCNC: 0.7 MG/DL (ref 0.2–1)
BILIRUBIN DIRECT+TOT PNL SERPL-MCNC: 0.2 MG/DL (ref 0–0.2)
BILIRUBIN DIRECT+TOT PNL SERPL-MCNC: 0.5 MG/DL
BUN SERPL-MCNC: 17 MG/DL (ref 7–18)
CALCIUM SERPL-MCNC: 8.7 MG/DL (ref 8.5–10.1)
CHLORIDE SERPL-SCNC: 111 MMOL/L (ref 98–107)
CO2 SERPL-SCNC: 26 MMOL/L (ref 21–32)
CREAT SERPL-MCNC: 1.2 MG/DL (ref 0.6–1.3)
DEPRECATED CALCIDIOL+CALCIFEROL SERPL-MC: 14.66 NG/ML (ref 30–80)
EST. AVERAGE GLUCOSE BLD GHB EST-MCNC: 117 MG/DL
GLOBULIN SER-MCNC: 4.1 GM/ML (ref 2.3–3.5)
GLUCOSE SERPL-MCNC: 100 MG/DL (ref 74–106)
HBA1C MFR BLD: 5.7 % (ref 4.2–6.3)
POTASSIUM SERPL-SCNC: 4 MMOL/L (ref 3.5–5.1)
PROT SERPL-MCNC: 7.8 GM/DL (ref 6.4–8.2)
SODIUM SERPL-SCNC: 141 MMOL/L (ref 136–145)

## 2019-11-01 ENCOUNTER — HISTORICAL (OUTPATIENT)
Dept: RADIOLOGY | Facility: HOSPITAL | Age: 64
End: 2019-11-01

## 2019-11-07 ENCOUNTER — HISTORICAL (OUTPATIENT)
Dept: ADMINISTRATIVE | Facility: HOSPITAL | Age: 64
End: 2019-11-07

## 2019-11-07 LAB
ABS NEUT (OLG): 3.21 X10(3)/MCL (ref 2.1–9.2)
ALBUMIN SERPL-MCNC: 3.9 GM/DL (ref 3.4–5)
ALBUMIN/GLOB SERPL: 1 RATIO (ref 1.1–2)
ALP SERPL-CCNC: 75 UNIT/L (ref 45–117)
ALT SERPL-CCNC: 40 UNIT/L (ref 12–78)
AST SERPL-CCNC: 28 UNIT/L (ref 15–37)
BASOPHILS # BLD AUTO: 0.1 X10(3)/MCL (ref 0–0.2)
BASOPHILS NFR BLD AUTO: 1 %
BILIRUB SERPL-MCNC: 1 MG/DL (ref 0.2–1)
BILIRUBIN DIRECT+TOT PNL SERPL-MCNC: 0.3 MG/DL (ref 0–0.2)
BILIRUBIN DIRECT+TOT PNL SERPL-MCNC: 0.7 MG/DL
BUN SERPL-MCNC: 19 MG/DL (ref 7–18)
CALCIUM SERPL-MCNC: 8.7 MG/DL (ref 8.5–10.1)
CHLORIDE SERPL-SCNC: 108 MMOL/L (ref 98–107)
CO2 SERPL-SCNC: 25 MMOL/L (ref 21–32)
CREAT SERPL-MCNC: 1.3 MG/DL (ref 0.6–1.3)
EOSINOPHIL # BLD AUTO: 0.2 X10(3)/MCL (ref 0–0.9)
EOSINOPHIL NFR BLD AUTO: 3 %
ERYTHROCYTE [DISTWIDTH] IN BLOOD BY AUTOMATED COUNT: 13.3 % (ref 11.5–14.5)
GLOBULIN SER-MCNC: 3.8 GM/ML (ref 2.3–3.5)
GLUCOSE SERPL-MCNC: 118 MG/DL (ref 74–106)
HCT VFR BLD AUTO: 43.9 % (ref 40–51)
HGB BLD-MCNC: 14.5 GM/DL (ref 13.5–17.5)
IMM GRANULOCYTES # BLD AUTO: 0.03 10*3/UL
IMM GRANULOCYTES NFR BLD AUTO: 0 %
LYMPHOCYTES # BLD AUTO: 1.9 X10(3)/MCL (ref 0.6–4.6)
LYMPHOCYTES NFR BLD AUTO: 32 %
MCH RBC QN AUTO: 29.7 PG (ref 26–34)
MCHC RBC AUTO-ENTMCNC: 33 GM/DL (ref 31–37)
MCV RBC AUTO: 89.8 FL (ref 80–100)
MONOCYTES # BLD AUTO: 0.6 X10(3)/MCL (ref 0.1–1.3)
MONOCYTES NFR BLD AUTO: 10 %
NEUTROPHILS # BLD AUTO: 3.21 X10(3)/MCL (ref 2.1–9.2)
NEUTROPHILS NFR BLD AUTO: 53 %
PLATELET # BLD AUTO: 270 X10(3)/MCL (ref 130–400)
PMV BLD AUTO: 9.7 FL (ref 7.4–10.4)
POTASSIUM SERPL-SCNC: 3.6 MMOL/L (ref 3.5–5.1)
PROT SERPL-MCNC: 7.7 GM/DL (ref 6.4–8.2)
RBC # BLD AUTO: 4.89 X10(6)/MCL (ref 4.5–5.9)
SODIUM SERPL-SCNC: 141 MMOL/L (ref 136–145)
WBC # SPEC AUTO: 6 X10(3)/MCL (ref 4.5–11)

## 2020-02-02 ENCOUNTER — HOSPITAL ENCOUNTER (OUTPATIENT)
Dept: MEDSURG UNIT | Facility: HOSPITAL | Age: 65
End: 2020-02-05
Attending: INTERNAL MEDICINE | Admitting: INTERNAL MEDICINE

## 2020-02-02 LAB
ETHANOL SERPL-MCNC: <3 MG/DL
MAGNESIUM SERPL-MCNC: 2 MG/DL (ref 1.6–2.6)
PHOSPHATE SERPL-MCNC: 3.6 MG/DL (ref 2.5–4.9)
T4 FREE SERPL-MCNC: 0.82 NG/DL (ref 0.76–1.46)
TSH SERPL-ACNC: 4.7 MIU/L (ref 0.36–3.74)

## 2020-02-03 LAB
ABS NEUT (OLG): 2.94 X10(3)/MCL (ref 2.1–9.2)
ALBUMIN SERPL-MCNC: 3.3 GM/DL (ref 3.4–5)
ALBUMIN/GLOB SERPL: 1 RATIO (ref 1.1–2)
ALP SERPL-CCNC: 66 UNIT/L (ref 45–117)
ALT SERPL-CCNC: 51 UNIT/L (ref 12–78)
AST SERPL-CCNC: 33 UNIT/L (ref 15–37)
BASOPHILS # BLD AUTO: 0.1 X10(3)/MCL (ref 0–0.2)
BASOPHILS NFR BLD AUTO: 1 %
BILIRUB SERPL-MCNC: 1.1 MG/DL (ref 0.2–1)
BILIRUBIN DIRECT+TOT PNL SERPL-MCNC: 0.3 MG/DL (ref 0–0.2)
BILIRUBIN DIRECT+TOT PNL SERPL-MCNC: 0.8 MG/DL
BUN SERPL-MCNC: 12 MG/DL (ref 7–18)
CALCIUM SERPL-MCNC: 8.7 MG/DL (ref 8.5–10.1)
CHLORIDE SERPL-SCNC: 110 MMOL/L (ref 98–107)
CO2 SERPL-SCNC: 27 MMOL/L (ref 21–32)
CREAT SERPL-MCNC: 1.1 MG/DL (ref 0.6–1.3)
EOSINOPHIL # BLD AUTO: 0.1 X10(3)/MCL (ref 0–0.9)
EOSINOPHIL NFR BLD AUTO: 2 %
ERYTHROCYTE [DISTWIDTH] IN BLOOD BY AUTOMATED COUNT: 13.3 % (ref 11.5–14.5)
GLOBULIN SER-MCNC: 3.2 GM/ML (ref 2.3–3.5)
GLUCOSE SERPL-MCNC: 96 MG/DL (ref 74–106)
HCT VFR BLD AUTO: 41.2 % (ref 40–51)
HGB BLD-MCNC: 13.7 GM/DL (ref 13.5–17.5)
IMM GRANULOCYTES # BLD AUTO: 0.02 10*3/UL
IMM GRANULOCYTES NFR BLD AUTO: 0 %
LYMPHOCYTES # BLD AUTO: 2.1 X10(3)/MCL (ref 0.6–4.6)
LYMPHOCYTES NFR BLD AUTO: 35 %
MCH RBC QN AUTO: 29.1 PG (ref 26–34)
MCHC RBC AUTO-ENTMCNC: 33.3 GM/DL (ref 31–37)
MCV RBC AUTO: 87.7 FL (ref 80–100)
MONOCYTES # BLD AUTO: 0.7 X10(3)/MCL (ref 0.1–1.3)
MONOCYTES NFR BLD AUTO: 11 %
NEUTROPHILS # BLD AUTO: 2.94 X10(3)/MCL (ref 2.1–9.2)
NEUTROPHILS NFR BLD AUTO: 50 %
PLATELET # BLD AUTO: 227 X10(3)/MCL (ref 130–400)
PMV BLD AUTO: 9.6 FL (ref 7.4–10.4)
POTASSIUM SERPL-SCNC: 3.6 MMOL/L (ref 3.5–5.1)
PROT SERPL-MCNC: 6.5 GM/DL (ref 6.4–8.2)
RBC # BLD AUTO: 4.7 X10(6)/MCL (ref 4.5–5.9)
SODIUM SERPL-SCNC: 142 MMOL/L (ref 136–145)
WBC # SPEC AUTO: 5.9 X10(3)/MCL (ref 4.5–11)

## 2020-02-04 LAB
ABS NEUT (OLG): 3.42 X10(3)/MCL (ref 2.1–9.2)
ALBUMIN SERPL-MCNC: 3.5 GM/DL (ref 3.4–5)
ALBUMIN/GLOB SERPL: 1 RATIO (ref 1.1–2)
ALP SERPL-CCNC: 70 UNIT/L (ref 45–117)
ALT SERPL-CCNC: 59 UNIT/L (ref 12–78)
AST SERPL-CCNC: 36 UNIT/L (ref 15–37)
BASOPHILS # BLD AUTO: 0.1 X10(3)/MCL (ref 0–0.2)
BASOPHILS NFR BLD AUTO: 1 %
BILIRUB SERPL-MCNC: 0.9 MG/DL (ref 0.2–1)
BILIRUBIN DIRECT+TOT PNL SERPL-MCNC: 0.2 MG/DL (ref 0–0.2)
BILIRUBIN DIRECT+TOT PNL SERPL-MCNC: 0.7 MG/DL
BUN SERPL-MCNC: 13 MG/DL (ref 7–18)
CALCIUM SERPL-MCNC: 8.8 MG/DL (ref 8.5–10.1)
CHLORIDE SERPL-SCNC: 110 MMOL/L (ref 98–107)
CO2 SERPL-SCNC: 26 MMOL/L (ref 21–32)
CREAT SERPL-MCNC: 1.2 MG/DL (ref 0.6–1.3)
EOSINOPHIL # BLD AUTO: 0.2 X10(3)/MCL (ref 0–0.9)
EOSINOPHIL NFR BLD AUTO: 3 %
ERYTHROCYTE [DISTWIDTH] IN BLOOD BY AUTOMATED COUNT: 13.3 % (ref 11.5–14.5)
GLOBULIN SER-MCNC: 3.6 GM/ML (ref 2.3–3.5)
GLUCOSE SERPL-MCNC: 106 MG/DL (ref 74–106)
HCT VFR BLD AUTO: 44.9 % (ref 40–51)
HGB BLD-MCNC: 15 GM/DL (ref 13.5–17.5)
IMM GRANULOCYTES # BLD AUTO: 0.03 10*3/UL
IMM GRANULOCYTES NFR BLD AUTO: 0 %
LYMPHOCYTES # BLD AUTO: 2.2 X10(3)/MCL (ref 0.6–4.6)
LYMPHOCYTES NFR BLD AUTO: 33 %
MCH RBC QN AUTO: 29.4 PG (ref 26–34)
MCHC RBC AUTO-ENTMCNC: 33.4 GM/DL (ref 31–37)
MCV RBC AUTO: 88 FL (ref 80–100)
MONOCYTES # BLD AUTO: 0.7 X10(3)/MCL (ref 0.1–1.3)
MONOCYTES NFR BLD AUTO: 11 %
NEUTROPHILS # BLD AUTO: 3.42 X10(3)/MCL (ref 2.1–9.2)
NEUTROPHILS NFR BLD AUTO: 52 %
PLATELET # BLD AUTO: 246 X10(3)/MCL (ref 130–400)
PMV BLD AUTO: 9.7 FL (ref 7.4–10.4)
POTASSIUM SERPL-SCNC: 3.8 MMOL/L (ref 3.5–5.1)
PROT SERPL-MCNC: 7.1 GM/DL (ref 6.4–8.2)
RBC # BLD AUTO: 5.1 X10(6)/MCL (ref 4.5–5.9)
SODIUM SERPL-SCNC: 142 MMOL/L (ref 136–145)
WBC # SPEC AUTO: 6.5 X10(3)/MCL (ref 4.5–11)

## 2020-02-05 LAB
ABS NEUT (OLG): 3.33 X10(3)/MCL (ref 2.1–9.2)
ALBUMIN SERPL-MCNC: 3.4 GM/DL (ref 3.4–5)
ALBUMIN/GLOB SERPL: 0.9 RATIO (ref 1.1–2)
ALP SERPL-CCNC: 65 UNIT/L (ref 45–117)
ALT SERPL-CCNC: 70 UNIT/L (ref 12–78)
AST SERPL-CCNC: 48 UNIT/L (ref 15–37)
BASOPHILS # BLD AUTO: 0.1 X10(3)/MCL (ref 0–0.2)
BASOPHILS NFR BLD AUTO: 1 %
BILIRUB SERPL-MCNC: 1 MG/DL (ref 0.2–1)
BUN SERPL-MCNC: 14 MG/DL (ref 7–18)
CALCIUM SERPL-MCNC: 8.6 MG/DL (ref 8.5–10.1)
CHLORIDE SERPL-SCNC: 109 MMOL/L (ref 98–107)
CO2 SERPL-SCNC: 25 MMOL/L (ref 21–32)
CREAT SERPL-MCNC: 1.2 MG/DL (ref 0.6–1.3)
EOSINOPHIL # BLD AUTO: 0.1 X10(3)/MCL (ref 0–0.9)
EOSINOPHIL NFR BLD AUTO: 2 %
ERYTHROCYTE [DISTWIDTH] IN BLOOD BY AUTOMATED COUNT: 13.3 % (ref 11.5–14.5)
GLOBULIN SER-MCNC: 3.6 GM/ML (ref 2.3–3.5)
GLUCOSE SERPL-MCNC: 102 MG/DL (ref 74–106)
HCT VFR BLD AUTO: 44.3 % (ref 40–51)
HGB BLD-MCNC: 14.7 GM/DL (ref 13.5–17.5)
IMM GRANULOCYTES # BLD AUTO: 0.04 10*3/UL
IMM GRANULOCYTES NFR BLD AUTO: 1 %
LYMPHOCYTES # BLD AUTO: 2.2 X10(3)/MCL (ref 0.6–4.6)
LYMPHOCYTES NFR BLD AUTO: 34 %
MCH RBC QN AUTO: 29.4 PG (ref 26–34)
MCHC RBC AUTO-ENTMCNC: 33.2 GM/DL (ref 31–37)
MCV RBC AUTO: 88.6 FL (ref 80–100)
MONOCYTES # BLD AUTO: 0.7 X10(3)/MCL (ref 0.1–1.3)
MONOCYTES NFR BLD AUTO: 11 %
NEUTROPHILS # BLD AUTO: 3.33 X10(3)/MCL (ref 2.1–9.2)
NEUTROPHILS NFR BLD AUTO: 51 %
PLATELET # BLD AUTO: 239 X10(3)/MCL (ref 130–400)
PMV BLD AUTO: 9.8 FL (ref 7.4–10.4)
POTASSIUM SERPL-SCNC: 3.8 MMOL/L (ref 3.5–5.1)
PROT SERPL-MCNC: 7 GM/DL (ref 6.4–8.2)
RBC # BLD AUTO: 5 X10(6)/MCL (ref 4.5–5.9)
SODIUM SERPL-SCNC: 139 MMOL/L (ref 136–145)
WBC # SPEC AUTO: 6.5 X10(3)/MCL (ref 4.5–11)

## 2020-05-12 ENCOUNTER — HISTORICAL (OUTPATIENT)
Dept: ADMINISTRATIVE | Facility: HOSPITAL | Age: 65
End: 2020-05-12

## 2020-05-12 LAB
BUN SERPL-MCNC: 12 MG/DL (ref 7–18)
CALCIUM SERPL-MCNC: 8.7 MG/DL (ref 8.5–10.1)
CHLORIDE SERPL-SCNC: 105 MMOL/L (ref 98–107)
CO2 SERPL-SCNC: 27 MMOL/L (ref 21–32)
CREAT SERPL-MCNC: 1.5 MG/DL (ref 0.6–1.3)
CREAT/UREA NIT SERPL: 8
GLUCOSE SERPL-MCNC: 173 MG/DL (ref 74–106)
POTASSIUM SERPL-SCNC: 2.7 MMOL/L (ref 3.5–5.1)
SODIUM SERPL-SCNC: 140 MMOL/L (ref 136–145)

## 2020-05-28 ENCOUNTER — HISTORICAL (OUTPATIENT)
Dept: CARDIOLOGY | Facility: CLINIC | Age: 65
End: 2020-05-28

## 2020-05-28 LAB
BUN SERPL-MCNC: 8 MG/DL (ref 7–18)
CALCIUM SERPL-MCNC: 8.6 MG/DL (ref 8.5–10.1)
CHLORIDE SERPL-SCNC: 109 MMOL/L (ref 98–107)
CO2 SERPL-SCNC: 25 MMOL/L (ref 21–32)
CREAT SERPL-MCNC: 1.1 MG/DL (ref 0.6–1.3)
CREAT/UREA NIT SERPL: 7
GLUCOSE SERPL-MCNC: 106 MG/DL (ref 74–106)
POTASSIUM SERPL-SCNC: 3.3 MMOL/L (ref 3.5–5.1)
SODIUM SERPL-SCNC: 140 MMOL/L (ref 136–145)

## 2020-07-12 ENCOUNTER — HOSPITAL ENCOUNTER (OUTPATIENT)
Dept: MEDSURG UNIT | Facility: HOSPITAL | Age: 65
End: 2020-07-15
Attending: INTERNAL MEDICINE | Admitting: INTERNAL MEDICINE

## 2020-07-12 LAB
ABS NEUT (OLG): 4.4 X10(3)/MCL (ref 2.1–9.2)
ALBUMIN SERPL-MCNC: 3.5 GM/DL (ref 3.4–5)
ALBUMIN/GLOB SERPL: 0.7 RATIO (ref 1.1–2)
ALP SERPL-CCNC: 64 UNIT/L (ref 45–117)
ALT SERPL-CCNC: 45 UNIT/L (ref 12–78)
APTT PPP: 31.4 SECOND(S) (ref 23.3–37)
AST SERPL-CCNC: 68 UNIT/L (ref 15–37)
BASOPHILS # BLD AUTO: 0 X10(3)/MCL (ref 0–0.2)
BASOPHILS NFR BLD AUTO: 0 %
BILIRUB SERPL-MCNC: 1.7 MG/DL (ref 0.2–1)
BILIRUBIN DIRECT+TOT PNL SERPL-MCNC: 0.5 MG/DL (ref 0–0.2)
BILIRUBIN DIRECT+TOT PNL SERPL-MCNC: 1.2 MG/DL
BUN SERPL-MCNC: 24 MG/DL (ref 7–18)
BUN SERPL-MCNC: 31 MG/DL (ref 7–18)
CALCIUM SERPL-MCNC: 7.1 MG/DL (ref 8.5–10.1)
CALCIUM SERPL-MCNC: 8.3 MG/DL (ref 8.5–10.1)
CHLORIDE SERPL-SCNC: 111 MMOL/L (ref 98–107)
CHLORIDE SERPL-SCNC: 112 MMOL/L (ref 98–107)
CK MB SERPL-MCNC: 2.2 NG/ML (ref 1–3.6)
CK SERPL-CCNC: 1836 UNIT/L (ref 39–308)
CO2 SERPL-SCNC: 18 MMOL/L (ref 21–32)
CO2 SERPL-SCNC: 20 MMOL/L (ref 21–32)
CREAT SERPL-MCNC: 1.6 MG/DL (ref 0.6–1.3)
CREAT SERPL-MCNC: 2.6 MG/DL (ref 0.6–1.3)
CREAT/UREA NIT SERPL: 15
CRP SERPL-MCNC: 2.7 MG/DL
D DIMER PPP IA.FEU-MCNC: 0.51 MCG/ML FEU
EOSINOPHIL # BLD AUTO: 0.2 X10(3)/MCL (ref 0–0.9)
EOSINOPHIL NFR BLD AUTO: 2 %
ERYTHROCYTE [DISTWIDTH] IN BLOOD BY AUTOMATED COUNT: 15 % (ref 11.5–14.5)
ERYTHROCYTE [SEDIMENTATION RATE] IN BLOOD: 8 MM/HR (ref 0–15)
FERRITIN SERPL-MCNC: 271.9 NG/ML (ref 26–388)
GLOBULIN SER-MCNC: 4.7 GM/ML (ref 2.3–3.5)
GLUCOSE SERPL-MCNC: 132 MG/DL (ref 74–106)
GLUCOSE SERPL-MCNC: 87 MG/DL (ref 74–106)
HCT VFR BLD AUTO: 51.1 % (ref 40–51)
HGB BLD-MCNC: 17.3 GM/DL (ref 13.5–17.5)
IMM GRANULOCYTES # BLD AUTO: 0.03 10*3/UL
IMM GRANULOCYTES NFR BLD AUTO: 0 %
INR PPP: 1.04 (ref 0.9–1.2)
LDH SERPL-CCNC: 257 UNIT/L (ref 87–241)
LYMPHOCYTES # BLD AUTO: 2.1 X10(3)/MCL (ref 0.6–4.6)
LYMPHOCYTES NFR BLD AUTO: 28 %
MAGNESIUM SERPL-MCNC: 1.9 MG/DL (ref 1.6–2.6)
MCH RBC QN AUTO: 28.5 PG (ref 26–34)
MCHC RBC AUTO-ENTMCNC: 33.9 GM/DL (ref 31–37)
MCV RBC AUTO: 84 FL (ref 80–100)
MONOCYTES # BLD AUTO: 0.7 X10(3)/MCL (ref 0.1–1.3)
MONOCYTES NFR BLD AUTO: 10 %
NEUTROPHILS # BLD AUTO: 4.4 X10(3)/MCL (ref 2.1–9.2)
NEUTROPHILS NFR BLD AUTO: 59 %
PHOSPHATE SERPL-MCNC: 2.9 MG/DL (ref 2.5–4.9)
PLATELET # BLD AUTO: 169 X10(3)/MCL (ref 130–400)
PMV BLD AUTO: 10.7 FL (ref 7.4–10.4)
POTASSIUM SERPL-SCNC: 2.6 MMOL/L (ref 3.5–5.1)
POTASSIUM SERPL-SCNC: 3.4 MMOL/L (ref 3.5–5.1)
PROT SERPL-MCNC: 8.2 GM/DL (ref 6.4–8.2)
PROTHROMBIN TIME: 13.2 SECOND(S) (ref 11.9–14.4)
RBC # BLD AUTO: 6.08 X10(6)/MCL (ref 4.5–5.9)
SARS-COV-2 AG RESP QL IA.RAPID: POSITIVE
SODIUM SERPL-SCNC: 141 MMOL/L (ref 136–145)
SODIUM SERPL-SCNC: 142 MMOL/L (ref 136–145)
TROPONIN I SERPL-MCNC: 0.18 NG/ML (ref 0–0.05)
TROPONIN I SERPL-MCNC: 0.26 NG/ML (ref 0–0.05)
WBC # SPEC AUTO: 7.5 X10(3)/MCL (ref 4.5–11)

## 2020-07-13 LAB
ABS NEUT (OLG): 3.09 X10(3)/MCL (ref 2.1–9.2)
ALBUMIN SERPL-MCNC: 2.3 GM/DL (ref 3.4–5)
ALBUMIN/GLOB SERPL: 0.8 RATIO (ref 1.1–2)
ALP SERPL-CCNC: 44 UNIT/L (ref 45–117)
ALT SERPL-CCNC: 32 UNIT/L (ref 12–78)
APTT PPP: >200 SECOND(S) (ref 23.3–37)
AST SERPL-CCNC: 48 UNIT/L (ref 15–37)
BASOPHILS # BLD AUTO: 0 X10(3)/MCL (ref 0–0.2)
BASOPHILS NFR BLD AUTO: 0 %
BILIRUB SERPL-MCNC: 1 MG/DL (ref 0.2–1)
BILIRUBIN DIRECT+TOT PNL SERPL-MCNC: 0.3 MG/DL (ref 0–0.2)
BILIRUBIN DIRECT+TOT PNL SERPL-MCNC: 0.7 MG/DL
BUN SERPL-MCNC: 24 MG/DL (ref 7–18)
C DIFF INTERP: NEGATIVE
CALCIUM SERPL-MCNC: 7.1 MG/DL (ref 8.5–10.1)
CHLORIDE SERPL-SCNC: 110 MMOL/L (ref 98–107)
CO2 SERPL-SCNC: 20 MMOL/L (ref 21–32)
CREAT SERPL-MCNC: 1.8 MG/DL (ref 0.6–1.3)
CRP SERPL-MCNC: 3.1 MG/DL
D DIMER PPP IA.FEU-MCNC: 0.68 MCG/ML FEU
EOSINOPHIL # BLD AUTO: 0.1 X10(3)/MCL (ref 0–0.9)
EOSINOPHIL NFR BLD AUTO: 2 %
ERYTHROCYTE [DISTWIDTH] IN BLOOD BY AUTOMATED COUNT: 15.4 % (ref 11.5–14.5)
FERRITIN SERPL-MCNC: 330.9 NG/ML (ref 26–388)
GLOBULIN SER-MCNC: 3 GM/ML (ref 2.3–3.5)
GLUCOSE SERPL-MCNC: 77 MG/DL (ref 74–106)
HCT VFR BLD AUTO: 53.4 % (ref 40–51)
HGB BLD-MCNC: 17.3 GM/DL (ref 13.5–17.5)
IMM GRANULOCYTES # BLD AUTO: 0.03 10*3/UL
IMM GRANULOCYTES NFR BLD AUTO: 0 %
LDH SERPL-CCNC: 372 UNIT/L (ref 87–241)
LYMPHOCYTES # BLD AUTO: 2.3 X10(3)/MCL (ref 0.6–4.6)
LYMPHOCYTES NFR BLD AUTO: 38 %
MCH RBC QN AUTO: 28.4 PG (ref 26–34)
MCHC RBC AUTO-ENTMCNC: 32.4 GM/DL (ref 31–37)
MCV RBC AUTO: 87.7 FL (ref 80–100)
MONOCYTES # BLD AUTO: 0.6 X10(3)/MCL (ref 0.1–1.3)
MONOCYTES NFR BLD AUTO: 9 %
NEUTROPHILS # BLD AUTO: 3.09 X10(3)/MCL (ref 2.1–9.2)
NEUTROPHILS NFR BLD AUTO: 51 %
PLATELET # BLD AUTO: 127 X10(3)/MCL (ref 130–400)
PMV BLD AUTO: 11.4 FL (ref 7.4–10.4)
POTASSIUM SERPL-SCNC: 3.5 MMOL/L (ref 3.5–5.1)
PROT SERPL-MCNC: 5.3 GM/DL (ref 6.4–8.2)
RBC # BLD AUTO: 6.09 X10(6)/MCL (ref 4.5–5.9)
SODIUM SERPL-SCNC: 143 MMOL/L (ref 136–145)
TROPONIN I SERPL-MCNC: 0.19 NG/ML (ref 0–0.05)
WBC # SPEC AUTO: 6.1 X10(3)/MCL (ref 4.5–11)

## 2020-07-14 LAB
ABS NEUT (OLG): 3.19 X10(3)/MCL (ref 2.1–9.2)
ALBUMIN SERPL-MCNC: 2.7 GM/DL (ref 3.4–5)
ALBUMIN/GLOB SERPL: 0.7 RATIO (ref 1.1–2)
ALP SERPL-CCNC: 48 UNIT/L (ref 45–117)
ALT SERPL-CCNC: 33 UNIT/L (ref 12–78)
AST SERPL-CCNC: 58 UNIT/L (ref 15–37)
BASOPHILS # BLD AUTO: 0 X10(3)/MCL (ref 0–0.2)
BASOPHILS NFR BLD AUTO: 0 %
BILIRUB SERPL-MCNC: 1.2 MG/DL (ref 0.2–1)
BILIRUBIN DIRECT+TOT PNL SERPL-MCNC: 0.3 MG/DL (ref 0–0.2)
BILIRUBIN DIRECT+TOT PNL SERPL-MCNC: 0.9 MG/DL
BUN SERPL-MCNC: 15 MG/DL (ref 7–18)
BUN SERPL-MCNC: 16 MG/DL (ref 7–18)
BUN SERPL-MCNC: 18 MG/DL (ref 7–18)
CALCIUM SERPL-MCNC: 7.4 MG/DL (ref 8.5–10.1)
CALCIUM SERPL-MCNC: 7.6 MG/DL (ref 8.5–10.1)
CALCIUM SERPL-MCNC: 7.9 MG/DL (ref 8.5–10.1)
CHLORIDE SERPL-SCNC: 105 MMOL/L (ref 98–107)
CHLORIDE SERPL-SCNC: 109 MMOL/L (ref 98–107)
CHLORIDE SERPL-SCNC: 109 MMOL/L (ref 98–107)
CO2 SERPL-SCNC: 22 MMOL/L (ref 21–32)
CO2 SERPL-SCNC: 23 MMOL/L (ref 21–32)
CO2 SERPL-SCNC: 26 MMOL/L (ref 21–32)
CREAT SERPL-MCNC: 1.2 MG/DL (ref 0.6–1.3)
CREAT SERPL-MCNC: 1.3 MG/DL (ref 0.6–1.3)
CREAT SERPL-MCNC: 1.5 MG/DL (ref 0.6–1.3)
CREAT/UREA NIT SERPL: 12
CREAT/UREA NIT SERPL: 13
ERYTHROCYTE [DISTWIDTH] IN BLOOD BY AUTOMATED COUNT: 14.8 % (ref 11.5–14.5)
GLOBULIN SER-MCNC: 3.9 GM/ML (ref 2.3–3.5)
GLUCOSE SERPL-MCNC: 106 MG/DL (ref 74–106)
GLUCOSE SERPL-MCNC: 123 MG/DL (ref 74–106)
GLUCOSE SERPL-MCNC: 80 MG/DL (ref 74–106)
HCT VFR BLD AUTO: 43.6 % (ref 40–51)
HGB BLD-MCNC: 14.6 GM/DL (ref 13.5–17.5)
IMM GRANULOCYTES # BLD AUTO: 0.01 10*3/UL
IMM GRANULOCYTES NFR BLD AUTO: 0 %
LYMPHOCYTES # BLD AUTO: 1.4 X10(3)/MCL (ref 0.6–4.6)
LYMPHOCYTES NFR BLD AUTO: 29 %
MAGNESIUM SERPL-MCNC: 1.6 MG/DL (ref 1.6–2.6)
MAGNESIUM SERPL-MCNC: 2 MG/DL (ref 1.6–2.6)
MCH RBC QN AUTO: 28.3 PG (ref 26–34)
MCHC RBC AUTO-ENTMCNC: 33.5 GM/DL (ref 31–37)
MCV RBC AUTO: 84.7 FL (ref 80–100)
MONOCYTES # BLD AUTO: 0.4 X10(3)/MCL (ref 0.1–1.3)
MONOCYTES NFR BLD AUTO: 7 %
NEUTROPHILS # BLD AUTO: 3.19 X10(3)/MCL (ref 2.1–9.2)
NEUTROPHILS NFR BLD AUTO: 64 %
PHOSPHATE SERPL-MCNC: 2.7 MG/DL (ref 2.5–4.9)
PLATELET # BLD AUTO: 110 X10(3)/MCL (ref 130–400)
PMV BLD AUTO: 11.8 FL (ref 7.4–10.4)
POTASSIUM SERPL-SCNC: 2.7 MMOL/L (ref 3.5–5.1)
POTASSIUM SERPL-SCNC: 3 MMOL/L (ref 3.5–5.1)
POTASSIUM SERPL-SCNC: 3.3 MMOL/L (ref 3.5–5.1)
PROT SERPL-MCNC: 6.6 GM/DL (ref 6.4–8.2)
RBC # BLD AUTO: 5.15 X10(6)/MCL (ref 4.5–5.9)
SODIUM SERPL-SCNC: 138 MMOL/L (ref 136–145)
SODIUM SERPL-SCNC: 140 MMOL/L (ref 136–145)
SODIUM SERPL-SCNC: 140 MMOL/L (ref 136–145)
WBC # SPEC AUTO: 5 X10(3)/MCL (ref 4.5–11)

## 2020-07-15 LAB
ABS NEUT (OLG): 2.7 X10(3)/MCL (ref 2.1–9.2)
ALBUMIN SERPL-MCNC: 2.3 GM/DL (ref 3.4–5)
ALBUMIN/GLOB SERPL: 0.5 RATIO (ref 1.1–2)
ALP SERPL-CCNC: 44 UNIT/L (ref 45–117)
ALT SERPL-CCNC: 32 UNIT/L (ref 12–78)
AST SERPL-CCNC: 69 UNIT/L (ref 15–37)
BASOPHILS # BLD AUTO: 0 X10(3)/MCL (ref 0–0.2)
BASOPHILS NFR BLD AUTO: 0 %
BILIRUB SERPL-MCNC: 1.2 MG/DL (ref 0.2–1)
BUN SERPL-MCNC: 14 MG/DL (ref 7–18)
CALCIUM SERPL-MCNC: 7.3 MG/DL (ref 8.5–10.1)
CHLORIDE SERPL-SCNC: 111 MMOL/L (ref 98–107)
CHLORIDE UR-SCNC: 112 MMOL/L
CO2 SERPL-SCNC: 20 MMOL/L (ref 21–32)
CREAT SERPL-MCNC: 1.1 MG/DL (ref 0.6–1.3)
CRP SERPL-MCNC: 5.9 MG/DL
D DIMER PPP IA.FEU-MCNC: 6.9 MCG/ML FEU
ERYTHROCYTE [DISTWIDTH] IN BLOOD BY AUTOMATED COUNT: 14.9 % (ref 11.5–14.5)
FERRITIN SERPL-MCNC: 749.3 NG/ML (ref 26–388)
FINAL CULTURE: NORMAL
GLOBULIN SER-MCNC: 4.3 GM/ML (ref 2.3–3.5)
GLUCOSE SERPL-MCNC: 101 MG/DL (ref 74–106)
HCT VFR BLD AUTO: 47.3 % (ref 40–51)
HGB BLD-MCNC: 15.7 GM/DL (ref 13.5–17.5)
IMM GRANULOCYTES # BLD AUTO: 0.02 10*3/UL
IMM GRANULOCYTES NFR BLD AUTO: 0 %
LDH SERPL-CCNC: 603 UNIT/L (ref 87–241)
LYMPHOCYTES # BLD AUTO: 1.5 X10(3)/MCL (ref 0.6–4.6)
LYMPHOCYTES NFR BLD AUTO: 34 %
MAGNESIUM SERPL-MCNC: 2 MG/DL (ref 1.6–2.6)
MCH RBC QN AUTO: 28.4 PG (ref 26–34)
MCHC RBC AUTO-ENTMCNC: 33.2 GM/DL (ref 31–37)
MCV RBC AUTO: 85.7 FL (ref 80–100)
MONOCYTES # BLD AUTO: 0.3 X10(3)/MCL (ref 0.1–1.3)
MONOCYTES NFR BLD AUTO: 6 %
NEUTROPHILS # BLD AUTO: 2.7 X10(3)/MCL (ref 2.1–9.2)
NEUTROPHILS NFR BLD AUTO: 60 %
OSMOLALITY UR: 422 MOSM/KG (ref 300–1300)
PHOSPHATE SERPL-MCNC: 2.3 MG/DL (ref 2.5–4.9)
PLATELET # BLD AUTO: 85 X10(3)/MCL (ref 130–400)
PMV BLD AUTO: 12.4 FL (ref 7.4–10.4)
POTASSIUM SERPL-SCNC: 3.7 MMOL/L (ref 3.5–5.1)
POTASSIUM UR-SCNC: 11 MMOL/L
PROT SERPL-MCNC: 6.6 GM/DL (ref 6.4–8.2)
RBC # BLD AUTO: 5.52 X10(6)/MCL (ref 4.5–5.9)
SODIUM SERPL-SCNC: 138 MMOL/L (ref 136–145)
SODIUM UR-SCNC: 86 MMOL/L
WBC # SPEC AUTO: 4.5 X10(3)/MCL (ref 4.5–11)

## 2020-07-18 LAB
FINAL CULTURE: NORMAL
FINAL CULTURE: NORMAL

## 2020-09-25 ENCOUNTER — HISTORICAL (OUTPATIENT)
Dept: ADMINISTRATIVE | Facility: HOSPITAL | Age: 65
End: 2020-09-25

## 2020-09-25 LAB
ABS NEUT (OLG): 4.11 X10(3)/MCL (ref 2.1–9.2)
ALBUMIN SERPL-MCNC: 4.1 GM/DL (ref 3.4–5)
ALBUMIN/GLOB SERPL: 1.1 RATIO (ref 1.1–2)
ALP SERPL-CCNC: 74 UNIT/L (ref 45–117)
ALT SERPL-CCNC: 50 UNIT/L (ref 12–78)
AST SERPL-CCNC: 28 UNIT/L (ref 15–37)
BASOPHILS # BLD AUTO: 0.1 X10(3)/MCL (ref 0–0.2)
BASOPHILS NFR BLD AUTO: 1 %
BILIRUB SERPL-MCNC: 2.2 MG/DL (ref 0.2–1)
BILIRUBIN DIRECT+TOT PNL SERPL-MCNC: 0.2 MG/DL (ref 0–0.2)
BILIRUBIN DIRECT+TOT PNL SERPL-MCNC: 2 MG/DL
BUN SERPL-MCNC: 9 MG/DL (ref 7–18)
CALCIUM SERPL-MCNC: 9 MG/DL (ref 8.5–10.1)
CHLORIDE SERPL-SCNC: 105 MMOL/L (ref 98–107)
CO2 SERPL-SCNC: 29 MMOL/L (ref 21–32)
CREAT SERPL-MCNC: 1.2 MG/DL (ref 0.6–1.3)
EOSINOPHIL # BLD AUTO: 0.1 X10(3)/MCL (ref 0–0.9)
EOSINOPHIL NFR BLD AUTO: 1 %
ERYTHROCYTE [DISTWIDTH] IN BLOOD BY AUTOMATED COUNT: 15.9 % (ref 11.5–14.5)
GLOBULIN SER-MCNC: 3.9 GM/ML (ref 2.3–3.5)
GLUCOSE SERPL-MCNC: 96 MG/DL (ref 74–106)
HCT VFR BLD AUTO: 42.2 % (ref 40–51)
HGB BLD-MCNC: 14 GM/DL (ref 13.5–17.5)
IMM GRANULOCYTES # BLD AUTO: 0.04 10*3/UL
IMM GRANULOCYTES NFR BLD AUTO: 0 %
LYMPHOCYTES # BLD AUTO: 2.9 X10(3)/MCL (ref 0.6–4.6)
LYMPHOCYTES NFR BLD AUTO: 36 %
MAGNESIUM SERPL-MCNC: 2 MG/DL (ref 1.6–2.6)
MCH RBC QN AUTO: 29.7 PG (ref 26–34)
MCHC RBC AUTO-ENTMCNC: 33.2 GM/DL (ref 31–37)
MCV RBC AUTO: 89.4 FL (ref 80–100)
MONOCYTES # BLD AUTO: 0.8 X10(3)/MCL (ref 0.1–1.3)
MONOCYTES NFR BLD AUTO: 10 %
NEUTROPHILS # BLD AUTO: 4.11 X10(3)/MCL (ref 2.1–9.2)
NEUTROPHILS NFR BLD AUTO: 52 %
PHOSPHATE SERPL-MCNC: 3.5 MG/DL (ref 2.5–4.9)
PLATELET # BLD AUTO: 303 X10(3)/MCL (ref 130–400)
PMV BLD AUTO: 10 FL (ref 7.4–10.4)
POTASSIUM SERPL-SCNC: 3 MMOL/L (ref 3.5–5.1)
PROT SERPL-MCNC: 8 GM/DL (ref 6.4–8.2)
RBC # BLD AUTO: 4.72 X10(6)/MCL (ref 4.5–5.9)
SODIUM SERPL-SCNC: 141 MMOL/L (ref 136–145)
WBC # SPEC AUTO: 8 X10(3)/MCL (ref 4.5–11)

## 2020-10-16 ENCOUNTER — HISTORICAL (OUTPATIENT)
Dept: ADMINISTRATIVE | Facility: HOSPITAL | Age: 65
End: 2020-10-16

## 2020-10-16 LAB
BUN SERPL-MCNC: 18 MG/DL (ref 7–18)
CALCIUM SERPL-MCNC: 8.3 MG/DL (ref 8.5–10.1)
CHLORIDE SERPL-SCNC: 109 MMOL/L (ref 98–107)
CO2 SERPL-SCNC: 27 MMOL/L (ref 21–32)
CREAT SERPL-MCNC: 1.2 MG/DL (ref 0.6–1.3)
CREAT/UREA NIT SERPL: 15 MG/DL (ref 12–14)
GLUCOSE SERPL-MCNC: 120 MG/DL (ref 74–106)
POTASSIUM SERPL-SCNC: 2.9 MMOL/L (ref 3.5–5.1)
SODIUM SERPL-SCNC: 144 MMOL/L (ref 136–145)

## 2020-10-19 ENCOUNTER — HISTORICAL (OUTPATIENT)
Dept: ADMINISTRATIVE | Facility: HOSPITAL | Age: 65
End: 2020-10-19

## 2020-10-19 LAB
DEPRECATED CALCIDIOL+CALCIFEROL SERPL-MC: 12.9 NG/ML (ref 30–80)
TSH SERPL-ACNC: 2.17 MIU/L (ref 0.36–3.74)

## 2020-10-22 ENCOUNTER — HISTORICAL (OUTPATIENT)
Dept: CARDIOLOGY | Facility: CLINIC | Age: 65
End: 2020-10-22

## 2020-10-22 LAB
ALBUMIN SERPL-MCNC: 4 GM/DL (ref 3.4–4.8)
ALBUMIN/GLOB SERPL: 1.2 RATIO (ref 1.1–2)
ALP SERPL-CCNC: 59 UNIT/L (ref 40–150)
ALT SERPL-CCNC: 26 UNIT/L (ref 0–55)
AST SERPL-CCNC: 24 UNIT/L (ref 5–34)
BILIRUB SERPL-MCNC: 1.7 MG/DL
BILIRUBIN DIRECT+TOT PNL SERPL-MCNC: 0.5 MG/DL (ref 0–0.5)
BILIRUBIN DIRECT+TOT PNL SERPL-MCNC: 1.2 MG/DL (ref 0–0.8)
BUN SERPL-MCNC: 11 MG/DL (ref 8.4–25.7)
CALCIUM SERPL-MCNC: 8.6 MG/DL (ref 8.8–10)
CHLORIDE SERPL-SCNC: 107 MMOL/L (ref 98–107)
CO2 SERPL-SCNC: 26 MMOL/L (ref 23–31)
CREAT SERPL-MCNC: 0.83 MG/DL (ref 0.73–1.18)
GLOBULIN SER-MCNC: 3.3 GM/DL (ref 2.4–3.5)
GLUCOSE SERPL-MCNC: 97 MG/DL (ref 82–115)
POTASSIUM SERPL-SCNC: 3.1 MMOL/L (ref 3.5–5.1)
PROT SERPL-MCNC: 7.3 GM/DL (ref 5.8–7.6)
SODIUM SERPL-SCNC: 140 MMOL/L (ref 136–145)

## 2020-10-30 ENCOUNTER — HISTORICAL (OUTPATIENT)
Dept: RADIOLOGY | Facility: HOSPITAL | Age: 65
End: 2020-10-30

## 2020-11-02 ENCOUNTER — HISTORICAL (OUTPATIENT)
Dept: RADIOLOGY | Facility: HOSPITAL | Age: 65
End: 2020-11-02

## 2020-11-09 ENCOUNTER — HISTORICAL (OUTPATIENT)
Dept: ADMINISTRATIVE | Facility: HOSPITAL | Age: 65
End: 2020-11-09

## 2020-11-09 LAB
ABS NEUT (OLG): 4.85 X10(3)/MCL (ref 2.1–9.2)
ALBUMIN SERPL-MCNC: 4.2 GM/DL (ref 3.4–4.8)
ALBUMIN/GLOB SERPL: 1.3 RATIO (ref 1.1–2)
ALP SERPL-CCNC: 61 UNIT/L (ref 40–150)
ALT SERPL-CCNC: 33 UNIT/L (ref 0–55)
AST SERPL-CCNC: 27 UNIT/L (ref 5–34)
BASOPHILS # BLD AUTO: 0.1 X10(3)/MCL (ref 0–0.2)
BASOPHILS NFR BLD AUTO: 1 %
BILIRUB SERPL-MCNC: 1.7 MG/DL
BILIRUBIN DIRECT+TOT PNL SERPL-MCNC: 0.6 MG/DL (ref 0–0.5)
BILIRUBIN DIRECT+TOT PNL SERPL-MCNC: 1.1 MG/DL (ref 0–0.8)
BUN SERPL-MCNC: 11 MG/DL (ref 8.4–25.7)
CALCIUM SERPL-MCNC: 8.9 MG/DL (ref 8.8–10)
CHLORIDE SERPL-SCNC: 104 MMOL/L (ref 98–107)
CO2 SERPL-SCNC: 25 MMOL/L (ref 23–31)
CREAT SERPL-MCNC: 1.05 MG/DL (ref 0.73–1.18)
EOSINOPHIL # BLD AUTO: 0.1 X10(3)/MCL (ref 0–0.9)
EOSINOPHIL NFR BLD AUTO: 1 %
ERYTHROCYTE [DISTWIDTH] IN BLOOD BY AUTOMATED COUNT: 14.6 % (ref 11.5–14.5)
GLOBULIN SER-MCNC: 3.3 GM/DL (ref 2.4–3.5)
GLUCOSE SERPL-MCNC: 174 MG/DL (ref 82–115)
HCT VFR BLD AUTO: 47.1 % (ref 40–51)
HGB BLD-MCNC: 15.7 GM/DL (ref 13.5–17.5)
IMM GRANULOCYTES # BLD AUTO: 0.05 10*3/UL
IMM GRANULOCYTES NFR BLD AUTO: 1 %
LYMPHOCYTES # BLD AUTO: 2.8 X10(3)/MCL (ref 0.6–4.6)
LYMPHOCYTES NFR BLD AUTO: 33 %
MCH RBC QN AUTO: 29.5 PG (ref 26–34)
MCHC RBC AUTO-ENTMCNC: 33.3 GM/DL (ref 31–37)
MCV RBC AUTO: 88.5 FL (ref 80–100)
MONOCYTES # BLD AUTO: 0.6 X10(3)/MCL (ref 0.1–1.3)
MONOCYTES NFR BLD AUTO: 7 %
NEUTROPHILS # BLD AUTO: 4.85 X10(3)/MCL (ref 2.1–9.2)
NEUTROPHILS NFR BLD AUTO: 57 %
PLATELET # BLD AUTO: 247 X10(3)/MCL (ref 130–400)
PMV BLD AUTO: 10 FL (ref 7.4–10.4)
POTASSIUM SERPL-SCNC: 3.4 MMOL/L (ref 3.5–5.1)
PROT SERPL-MCNC: 7.5 GM/DL (ref 5.8–7.6)
RBC # BLD AUTO: 5.32 X10(6)/MCL (ref 4.5–5.9)
SODIUM SERPL-SCNC: 141 MMOL/L (ref 136–145)
WBC # SPEC AUTO: 8.5 X10(3)/MCL (ref 4.5–11)

## 2020-11-10 ENCOUNTER — HISTORICAL (OUTPATIENT)
Dept: RADIOLOGY | Facility: HOSPITAL | Age: 65
End: 2020-11-10

## 2021-02-22 ENCOUNTER — HISTORICAL (OUTPATIENT)
Dept: ADMINISTRATIVE | Facility: HOSPITAL | Age: 66
End: 2021-02-22

## 2021-02-22 LAB
BUN SERPL-MCNC: 19 MG/DL (ref 8.4–25.7)
CALCIUM SERPL-MCNC: 8.4 MG/DL (ref 8.8–10)
CHLORIDE SERPL-SCNC: 104 MMOL/L (ref 98–107)
CO2 SERPL-SCNC: 29 MMOL/L (ref 23–31)
CREAT SERPL-MCNC: 1.16 MG/DL (ref 0.73–1.18)
CREAT/UREA NIT SERPL: 16
GLUCOSE SERPL-MCNC: 150 MG/DL (ref 82–115)
POTASSIUM SERPL-SCNC: 3.2 MMOL/L (ref 3.5–5.1)
SODIUM SERPL-SCNC: 142 MMOL/L (ref 136–145)

## 2021-03-03 ENCOUNTER — HISTORICAL (OUTPATIENT)
Dept: CARDIOLOGY | Facility: CLINIC | Age: 66
End: 2021-03-03

## 2021-03-03 LAB
BUN SERPL-MCNC: 11.1 MG/DL (ref 8.4–25.7)
CALCIUM SERPL-MCNC: 8.8 MG/DL (ref 8.8–10)
CHLORIDE SERPL-SCNC: 107 MMOL/L (ref 98–107)
CO2 SERPL-SCNC: 23 MMOL/L (ref 23–31)
CREAT SERPL-MCNC: 1.11 MG/DL (ref 0.73–1.18)
CREAT/UREA NIT SERPL: 10
GLUCOSE SERPL-MCNC: 139 MG/DL (ref 82–115)
POTASSIUM SERPL-SCNC: 3.2 MMOL/L (ref 3.5–5.1)
SODIUM SERPL-SCNC: 142 MMOL/L (ref 136–145)

## 2021-03-23 ENCOUNTER — HISTORICAL (OUTPATIENT)
Dept: CARDIOLOGY | Facility: CLINIC | Age: 66
End: 2021-03-23

## 2021-03-23 LAB
BUN SERPL-MCNC: 11.7 MG/DL (ref 8.4–25.7)
CALCIUM SERPL-MCNC: 9 MG/DL (ref 8.8–10)
CHLORIDE SERPL-SCNC: 104 MMOL/L (ref 98–107)
CO2 SERPL-SCNC: 25 MMOL/L (ref 23–31)
CREAT SERPL-MCNC: 1.03 MG/DL (ref 0.73–1.18)
CREAT/UREA NIT SERPL: 11
GLUCOSE SERPL-MCNC: 99 MG/DL (ref 82–115)
POTASSIUM SERPL-SCNC: 3.3 MMOL/L (ref 3.5–5.1)
SODIUM SERPL-SCNC: 141 MMOL/L (ref 136–145)

## 2021-04-20 ENCOUNTER — HISTORICAL (OUTPATIENT)
Dept: ADMINISTRATIVE | Facility: HOSPITAL | Age: 66
End: 2021-04-20

## 2021-04-20 LAB
BUN SERPL-MCNC: 17.4 MG/DL (ref 8.4–25.7)
CALCIUM SERPL-MCNC: 9.8 MG/DL (ref 8.8–10)
CHLORIDE SERPL-SCNC: 103 MMOL/L (ref 98–107)
CO2 SERPL-SCNC: 28 MMOL/L (ref 23–31)
CREAT SERPL-MCNC: 1.37 MG/DL (ref 0.73–1.18)
CREAT/UREA NIT SERPL: 13
GLUCOSE SERPL-MCNC: 90 MG/DL (ref 82–115)
POTASSIUM SERPL-SCNC: 3.2 MMOL/L (ref 3.5–5.1)
SODIUM SERPL-SCNC: 140 MMOL/L (ref 136–145)

## 2021-05-03 ENCOUNTER — HISTORICAL (OUTPATIENT)
Dept: RADIOLOGY | Facility: HOSPITAL | Age: 66
End: 2021-05-03

## 2021-05-03 ENCOUNTER — HISTORICAL (OUTPATIENT)
Dept: CARDIOLOGY | Facility: CLINIC | Age: 66
End: 2021-05-03

## 2021-05-03 LAB
ABS NEUT (OLG): 3.27 X10(3)/MCL (ref 2.1–9.2)
ALBUMIN SERPL-MCNC: 4.2 GM/DL (ref 3.4–4.8)
ALBUMIN/GLOB SERPL: 1.3 RATIO (ref 1.1–2)
ALP SERPL-CCNC: 67 UNIT/L (ref 40–150)
ALT SERPL-CCNC: 26 UNIT/L (ref 0–55)
AST SERPL-CCNC: 25 UNIT/L (ref 5–34)
BASOPHILS # BLD AUTO: 0.1 X10(3)/MCL (ref 0–0.2)
BASOPHILS NFR BLD AUTO: 1 %
BILIRUB SERPL-MCNC: 2 MG/DL
BILIRUBIN DIRECT+TOT PNL SERPL-MCNC: 0.5 MG/DL (ref 0–0.5)
BILIRUBIN DIRECT+TOT PNL SERPL-MCNC: 1.5 MG/DL (ref 0–0.8)
BUN SERPL-MCNC: 11.3 MG/DL (ref 8.4–25.7)
CALCIUM SERPL-MCNC: 9.4 MG/DL (ref 8.8–10)
CHLORIDE SERPL-SCNC: 111 MMOL/L (ref 98–107)
CHOLEST SERPL-MCNC: 72 MG/DL
CHOLEST/HDLC SERPL: 2 {RATIO} (ref 0–5)
CO2 SERPL-SCNC: 19 MMOL/L (ref 23–31)
CREAT SERPL-MCNC: 1.09 MG/DL (ref 0.73–1.18)
EOSINOPHIL # BLD AUTO: 0.1 X10(3)/MCL (ref 0–0.9)
EOSINOPHIL NFR BLD AUTO: 1 %
ERYTHROCYTE [DISTWIDTH] IN BLOOD BY AUTOMATED COUNT: 13.6 % (ref 11.5–14.5)
GLOBULIN SER-MCNC: 3.3 GM/DL (ref 2.4–3.5)
GLUCOSE SERPL-MCNC: 98 MG/DL (ref 82–115)
HCT VFR BLD AUTO: 45.4 % (ref 40–51)
HDLC SERPL-MCNC: 29 MG/DL (ref 35–60)
HGB BLD-MCNC: 15.2 GM/DL (ref 13.5–17.5)
IMM GRANULOCYTES # BLD AUTO: 0.03 10*3/UL
IMM GRANULOCYTES NFR BLD AUTO: 0 %
LDLC SERPL CALC-MCNC: 28 MG/DL (ref 50–140)
LYMPHOCYTES # BLD AUTO: 2.6 X10(3)/MCL (ref 0.6–4.6)
LYMPHOCYTES NFR BLD AUTO: 38 %
MCH RBC QN AUTO: 29.2 PG (ref 26–34)
MCHC RBC AUTO-ENTMCNC: 33.5 GM/DL (ref 31–37)
MCV RBC AUTO: 87.3 FL (ref 80–100)
MONOCYTES # BLD AUTO: 0.8 X10(3)/MCL (ref 0.1–1.3)
MONOCYTES NFR BLD AUTO: 11 %
NEUTROPHILS # BLD AUTO: 3.27 X10(3)/MCL (ref 2.1–9.2)
NEUTROPHILS NFR BLD AUTO: 47 %
PLATELET # BLD AUTO: 228 X10(3)/MCL (ref 130–400)
PMV BLD AUTO: 10.9 FL (ref 7.4–10.4)
POTASSIUM SERPL-SCNC: 4.1 MMOL/L (ref 3.5–5.1)
PROT SERPL-MCNC: 7.5 GM/DL (ref 5.8–7.6)
RBC # BLD AUTO: 5.2 X10(6)/MCL (ref 4.5–5.9)
SODIUM SERPL-SCNC: 142 MMOL/L (ref 136–145)
TRIGL SERPL-MCNC: 73 MG/DL (ref 34–140)
VLDLC SERPL CALC-MCNC: 15 MG/DL
WBC # SPEC AUTO: 6.9 X10(3)/MCL (ref 4.5–11)

## 2021-05-10 ENCOUNTER — HISTORICAL (OUTPATIENT)
Dept: ADMINISTRATIVE | Facility: HOSPITAL | Age: 66
End: 2021-05-10

## 2021-05-10 LAB
ABS NEUT (OLG): 3.76 X10(3)/MCL (ref 2.1–9.2)
ALBUMIN SERPL-MCNC: 4.2 GM/DL (ref 3.4–4.8)
ALBUMIN/GLOB SERPL: 1.2 RATIO (ref 1.1–2)
ALP SERPL-CCNC: 72 UNIT/L (ref 40–150)
ALT SERPL-CCNC: 25 UNIT/L (ref 0–55)
AST SERPL-CCNC: 27 UNIT/L (ref 5–34)
BASOPHILS # BLD AUTO: 0.1 X10(3)/MCL (ref 0–0.2)
BASOPHILS NFR BLD AUTO: 1 %
BILIRUB SERPL-MCNC: 2 MG/DL
BILIRUBIN DIRECT+TOT PNL SERPL-MCNC: 0.5 MG/DL (ref 0–0.5)
BILIRUBIN DIRECT+TOT PNL SERPL-MCNC: 1.5 MG/DL (ref 0–0.8)
BUN SERPL-MCNC: 12 MG/DL (ref 8.4–25.7)
CALCIUM SERPL-MCNC: 9.5 MG/DL (ref 8.8–10)
CHLORIDE SERPL-SCNC: 110 MMOL/L (ref 98–107)
CO2 SERPL-SCNC: 22 MMOL/L (ref 23–31)
CREAT SERPL-MCNC: 1.23 MG/DL (ref 0.73–1.18)
EOSINOPHIL # BLD AUTO: 0.1 X10(3)/MCL (ref 0–0.9)
EOSINOPHIL NFR BLD AUTO: 2 %
ERYTHROCYTE [DISTWIDTH] IN BLOOD BY AUTOMATED COUNT: 13.6 % (ref 11.5–14.5)
GLOBULIN SER-MCNC: 3.6 GM/DL (ref 2.4–3.5)
GLUCOSE SERPL-MCNC: 109 MG/DL (ref 82–115)
HCT VFR BLD AUTO: 45.2 % (ref 40–51)
HGB BLD-MCNC: 15.2 GM/DL (ref 13.5–17.5)
IMM GRANULOCYTES # BLD AUTO: 0.06 10*3/UL
IMM GRANULOCYTES NFR BLD AUTO: 1 %
LYMPHOCYTES # BLD AUTO: 2.8 X10(3)/MCL (ref 0.6–4.6)
LYMPHOCYTES NFR BLD AUTO: 37 %
MCH RBC QN AUTO: 29.6 PG (ref 26–34)
MCHC RBC AUTO-ENTMCNC: 33.6 GM/DL (ref 31–37)
MCV RBC AUTO: 88.1 FL (ref 80–100)
MONOCYTES # BLD AUTO: 0.8 X10(3)/MCL (ref 0.1–1.3)
MONOCYTES NFR BLD AUTO: 10 %
NEUTROPHILS # BLD AUTO: 3.76 X10(3)/MCL (ref 2.1–9.2)
NEUTROPHILS NFR BLD AUTO: 49 %
PLATELET # BLD AUTO: 234 X10(3)/MCL (ref 130–400)
PMV BLD AUTO: 10.5 FL (ref 7.4–10.4)
POTASSIUM SERPL-SCNC: 4.7 MMOL/L (ref 3.5–5.1)
PROT SERPL-MCNC: 7.8 GM/DL (ref 5.8–7.6)
RBC # BLD AUTO: 5.13 X10(6)/MCL (ref 4.5–5.9)
SODIUM SERPL-SCNC: 139 MMOL/L (ref 136–145)
WBC # SPEC AUTO: 7.6 X10(3)/MCL (ref 4.5–11)

## 2021-07-02 ENCOUNTER — HOSPITAL ENCOUNTER (OUTPATIENT)
Dept: MEDSURG UNIT | Facility: HOSPITAL | Age: 66
End: 2021-07-03
Attending: INTERNAL MEDICINE | Admitting: INTERNAL MEDICINE

## 2021-07-02 LAB
ABS NEUT (OLG): 7.14 X10(3)/MCL (ref 2.1–9.2)
ALBUMIN SERPL-MCNC: 4.4 GM/DL (ref 3.4–4.8)
ALBUMIN/GLOB SERPL: 1.1 RATIO (ref 1.1–2)
ALP SERPL-CCNC: 70 UNIT/L (ref 40–150)
ALT SERPL-CCNC: 28 UNIT/L (ref 0–55)
APPEARANCE, UA: CLEAR
AST SERPL-CCNC: 22 UNIT/L (ref 5–34)
BASOPHILS # BLD AUTO: 0.1 X10(3)/MCL (ref 0–0.2)
BASOPHILS NFR BLD AUTO: 1 %
BILIRUB SERPL-MCNC: 2.3 MG/DL
BILIRUB UR QL STRIP: NEGATIVE
BILIRUBIN DIRECT+TOT PNL SERPL-MCNC: 0.4 MG/DL (ref 0–0.5)
BILIRUBIN DIRECT+TOT PNL SERPL-MCNC: 1.9 MG/DL (ref 0–0.8)
BUN SERPL-MCNC: 22.4 MG/DL (ref 8.4–25.7)
CALCIUM SERPL-MCNC: 9.8 MG/DL (ref 8.8–10)
CHLORIDE SERPL-SCNC: 105 MMOL/L (ref 98–107)
CK SERPL-CCNC: 296 U/L (ref 30–200)
CO2 SERPL-SCNC: 24 MMOL/L (ref 23–31)
COLOR UR: ABNORMAL
CREAT SERPL-MCNC: 2.17 MG/DL (ref 0.73–1.18)
EOSINOPHIL # BLD AUTO: 0 X10(3)/MCL (ref 0–0.9)
EOSINOPHIL NFR BLD AUTO: 0 %
ERYTHROCYTE [DISTWIDTH] IN BLOOD BY AUTOMATED COUNT: 14.5 % (ref 11.5–14.5)
EST. AVERAGE GLUCOSE BLD GHB EST-MCNC: 128.4 MG/DL
GLOBULIN SER-MCNC: 3.9 GM/DL (ref 2.4–3.5)
GLUCOSE (UA): NEGATIVE
GLUCOSE SERPL-MCNC: 91 MG/DL (ref 82–115)
HBA1C MFR BLD: 6.1 %
HCT VFR BLD AUTO: 45.3 % (ref 40–51)
HGB BLD-MCNC: 15.3 GM/DL (ref 13.5–17.5)
HGB UR QL STRIP: 0.06 MG/DL
IMM GRANULOCYTES # BLD AUTO: 0.08 10*3/UL
IMM GRANULOCYTES NFR BLD AUTO: 1 %
KETONES UR QL STRIP: NEGATIVE
LEUKOCYTE ESTERASE UR QL STRIP: NEGATIVE
LIPASE SERPL-CCNC: 39 U/L
LYMPHOCYTES # BLD AUTO: 2.2 X10(3)/MCL (ref 0.6–4.6)
LYMPHOCYTES NFR BLD AUTO: 21 %
MAGNESIUM SERPL-MCNC: 2 MG/DL (ref 1.6–2.6)
MCH RBC QN AUTO: 29.9 PG (ref 26–34)
MCHC RBC AUTO-ENTMCNC: 33.8 GM/DL (ref 31–37)
MCV RBC AUTO: 88.6 FL (ref 80–100)
MONOCYTES # BLD AUTO: 1 X10(3)/MCL (ref 0.1–1.3)
MONOCYTES NFR BLD AUTO: 9 %
NEUTROPHILS # BLD AUTO: 7.14 X10(3)/MCL (ref 2.1–9.2)
NEUTROPHILS NFR BLD AUTO: 68 %
NITRITE UR QL STRIP: NEGATIVE
NRBC BLD AUTO-RTO: 0 % (ref 0–0.2)
PH UR STRIP: 5.5 [PH] (ref 4.5–8)
PHOSPHATE SERPL-MCNC: 4.8 MG/DL (ref 2.3–4.7)
PLATELET # BLD AUTO: 246 X10(3)/MCL (ref 130–400)
PMV BLD AUTO: 9.9 FL (ref 7.4–10.4)
POTASSIUM SERPL-SCNC: 4 MMOL/L (ref 3.5–5.1)
PROT SERPL-MCNC: 8.3 GM/DL (ref 5.8–7.6)
PROT UR QL STRIP: 10 MG/DL
RBC # BLD AUTO: 5.11 X10(6)/MCL (ref 4.5–5.9)
RET# (OHS): 0.09 X10(6)/MCL (ref 0.02–0.09)
RETICULOCYTE COUNT AUTOMATED (OLG): 1.7 % (ref 0.5–1.5)
SARS-COV-2 AG RESP QL IA.RAPID: NEGATIVE
SODIUM SERPL-SCNC: 140 MMOL/L (ref 136–145)
SP GR UR STRIP: 1.01 (ref 1–1.03)
TROPONIN I SERPL-MCNC: <0.01 NG/ML (ref 0–0.04)
TSH SERPL-ACNC: 3.43 UIU/ML (ref 0.35–4.94)
UROBILINOGEN UR STRIP-ACNC: NORMAL
WBC # SPEC AUTO: 10.5 X10(3)/MCL (ref 4.5–11)

## 2021-07-03 LAB
ABS NEUT (OLG): 3.63 X10(3)/MCL (ref 2.1–9.2)
ALBUMIN SERPL-MCNC: 3.4 GM/DL (ref 3.4–4.8)
ALBUMIN SERPL-MCNC: 3.6 GM/DL (ref 3.4–4.8)
ALBUMIN/GLOB SERPL: 1.1 RATIO (ref 1.1–2)
ALBUMIN/GLOB SERPL: 1.1 RATIO (ref 1.1–2)
ALP SERPL-CCNC: 54 UNIT/L (ref 40–150)
ALP SERPL-CCNC: 58 UNIT/L (ref 40–150)
ALT SERPL-CCNC: 19 UNIT/L (ref 0–55)
ALT SERPL-CCNC: 22 UNIT/L (ref 0–55)
AST SERPL-CCNC: 16 UNIT/L (ref 5–34)
AST SERPL-CCNC: 19 UNIT/L (ref 5–34)
BASOPHILS # BLD AUTO: 0.1 X10(3)/MCL (ref 0–0.2)
BASOPHILS NFR BLD AUTO: 1 %
BILIRUB SERPL-MCNC: 1.7 MG/DL
BILIRUB SERPL-MCNC: 2 MG/DL
BILIRUBIN DIRECT+TOT PNL SERPL-MCNC: 0.3 MG/DL (ref 0–0.5)
BILIRUBIN DIRECT+TOT PNL SERPL-MCNC: 0.6 MG/DL (ref 0–0.5)
BILIRUBIN DIRECT+TOT PNL SERPL-MCNC: 1.1 MG/DL (ref 0–0.8)
BILIRUBIN DIRECT+TOT PNL SERPL-MCNC: 1.7 MG/DL (ref 0–0.8)
BUN SERPL-MCNC: 18.5 MG/DL (ref 8.4–25.7)
BUN SERPL-MCNC: 18.7 MG/DL (ref 8.4–25.7)
CALCIUM SERPL-MCNC: 9.1 MG/DL (ref 8.8–10)
CALCIUM SERPL-MCNC: 9.6 MG/DL (ref 8.8–10)
CHLORIDE SERPL-SCNC: 106 MMOL/L (ref 98–107)
CHLORIDE SERPL-SCNC: 109 MMOL/L (ref 98–107)
CHOLEST SERPL-MCNC: 61 MG/DL
CHOLEST/HDLC SERPL: 3 {RATIO} (ref 0–5)
CK SERPL-CCNC: 233 U/L (ref 30–200)
CO2 SERPL-SCNC: 25 MMOL/L (ref 23–31)
CO2 SERPL-SCNC: 27 MMOL/L (ref 23–31)
CREAT SERPL-MCNC: 1.32 MG/DL (ref 0.73–1.18)
CREAT SERPL-MCNC: 1.42 MG/DL (ref 0.73–1.18)
EOSINOPHIL # BLD AUTO: 0.1 X10(3)/MCL (ref 0–0.9)
EOSINOPHIL NFR BLD AUTO: 1 %
ERYTHROCYTE [DISTWIDTH] IN BLOOD BY AUTOMATED COUNT: 14.4 % (ref 11.5–14.5)
GLOBULIN SER-MCNC: 3 GM/DL (ref 2.4–3.5)
GLOBULIN SER-MCNC: 3.2 GM/DL (ref 2.4–3.5)
GLUCOSE SERPL-MCNC: 121 MG/DL (ref 82–115)
GLUCOSE SERPL-MCNC: 98 MG/DL (ref 82–115)
HCT VFR BLD AUTO: 39.2 % (ref 40–51)
HDLC SERPL-MCNC: 18 MG/DL (ref 35–60)
HGB BLD-MCNC: 13.4 GM/DL (ref 13.5–17.5)
IMM GRANULOCYTES # BLD AUTO: 0.03 10*3/UL
IMM GRANULOCYTES NFR BLD AUTO: 0 %
LDLC SERPL CALC-MCNC: 21 MG/DL (ref 50–140)
LYMPHOCYTES # BLD AUTO: 2.1 X10(3)/MCL (ref 0.6–4.6)
LYMPHOCYTES NFR BLD AUTO: 31 %
MAGNESIUM SERPL-MCNC: 1.9 MG/DL (ref 1.6–2.6)
MCH RBC QN AUTO: 29.8 PG (ref 26–34)
MCHC RBC AUTO-ENTMCNC: 34.2 GM/DL (ref 31–37)
MCV RBC AUTO: 87.3 FL (ref 80–100)
MONOCYTES # BLD AUTO: 0.8 X10(3)/MCL (ref 0.1–1.3)
MONOCYTES NFR BLD AUTO: 12 %
NEUTROPHILS # BLD AUTO: 3.63 X10(3)/MCL (ref 2.1–9.2)
NEUTROPHILS NFR BLD AUTO: 54 %
NRBC BLD AUTO-RTO: 0 % (ref 0–0.2)
PHOSPHATE SERPL-MCNC: 3.9 MG/DL (ref 2.3–4.7)
PLATELET # BLD AUTO: 209 X10(3)/MCL (ref 130–400)
PMV BLD AUTO: 9.8 FL (ref 7.4–10.4)
POTASSIUM SERPL-SCNC: 3.7 MMOL/L (ref 3.5–5.1)
POTASSIUM SERPL-SCNC: 3.9 MMOL/L (ref 3.5–5.1)
PROT SERPL-MCNC: 6.4 GM/DL (ref 5.8–7.6)
PROT SERPL-MCNC: 6.8 GM/DL (ref 5.8–7.6)
RBC # BLD AUTO: 4.49 X10(6)/MCL (ref 4.5–5.9)
SODIUM SERPL-SCNC: 140 MMOL/L (ref 136–145)
SODIUM SERPL-SCNC: 141 MMOL/L (ref 136–145)
TRIGL SERPL-MCNC: 110 MG/DL (ref 34–140)
VLDLC SERPL CALC-MCNC: 22 MG/DL
WBC # SPEC AUTO: 6.7 X10(3)/MCL (ref 4.5–11)

## 2021-07-20 ENCOUNTER — HISTORICAL (OUTPATIENT)
Dept: ADMINISTRATIVE | Facility: HOSPITAL | Age: 66
End: 2021-07-20

## 2021-07-20 LAB
ABS NEUT (OLG): 5.94 X10(3)/MCL (ref 2.1–9.2)
ALBUMIN SERPL-MCNC: 4.4 GM/DL (ref 3.4–4.8)
ALBUMIN/GLOB SERPL: 1.5 RATIO (ref 1.1–2)
ALP SERPL-CCNC: 69 UNIT/L (ref 40–150)
ALT SERPL-CCNC: 18 UNIT/L (ref 0–55)
AST SERPL-CCNC: 18 UNIT/L (ref 5–34)
BASOPHILS # BLD AUTO: 0.1 X10(3)/MCL (ref 0–0.2)
BASOPHILS NFR BLD AUTO: 1 %
BILIRUB SERPL-MCNC: 1.8 MG/DL
BILIRUBIN DIRECT+TOT PNL SERPL-MCNC: 0.6 MG/DL (ref 0–0.5)
BILIRUBIN DIRECT+TOT PNL SERPL-MCNC: 1.2 MG/DL (ref 0–0.8)
BUN SERPL-MCNC: 17.5 MG/DL (ref 8.4–25.7)
CALCIUM SERPL-MCNC: 9.5 MG/DL (ref 8.8–10)
CHLORIDE SERPL-SCNC: 109 MMOL/L (ref 98–107)
CO2 SERPL-SCNC: 24 MMOL/L (ref 23–31)
CREAT SERPL-MCNC: 1.41 MG/DL (ref 0.73–1.18)
EOSINOPHIL # BLD AUTO: 0.1 X10(3)/MCL (ref 0–0.9)
EOSINOPHIL NFR BLD AUTO: 1 %
ERYTHROCYTE [DISTWIDTH] IN BLOOD BY AUTOMATED COUNT: 14.3 % (ref 11.5–14.5)
GLOBULIN SER-MCNC: 3 GM/DL (ref 2.4–3.5)
GLUCOSE SERPL-MCNC: 83 MG/DL (ref 82–115)
HCT VFR BLD AUTO: 46.1 % (ref 40–51)
HGB BLD-MCNC: 15.4 GM/DL (ref 13.5–17.5)
IMM GRANULOCYTES # BLD AUTO: 0.08 10*3/UL
IMM GRANULOCYTES NFR BLD AUTO: 1 %
LYMPHOCYTES # BLD AUTO: 3 X10(3)/MCL (ref 0.6–4.6)
LYMPHOCYTES NFR BLD AUTO: 29 %
MAGNESIUM SERPL-MCNC: 1.8 MG/DL (ref 1.6–2.6)
MCH RBC QN AUTO: 29.7 PG (ref 26–34)
MCHC RBC AUTO-ENTMCNC: 33.4 GM/DL (ref 31–37)
MCV RBC AUTO: 88.8 FL (ref 80–100)
MONOCYTES # BLD AUTO: 1 X10(3)/MCL (ref 0.1–1.3)
MONOCYTES NFR BLD AUTO: 10 %
NEUTROPHILS # BLD AUTO: 5.94 X10(3)/MCL (ref 2.1–9.2)
NEUTROPHILS NFR BLD AUTO: 58 %
NRBC BLD AUTO-RTO: 0 % (ref 0–0.2)
PLATELET # BLD AUTO: 238 X10(3)/MCL (ref 130–400)
PMV BLD AUTO: 10.1 FL (ref 7.4–10.4)
POTASSIUM SERPL-SCNC: 4.1 MMOL/L (ref 3.5–5.1)
PROT SERPL-MCNC: 7.4 GM/DL (ref 5.8–7.6)
RBC # BLD AUTO: 5.19 X10(6)/MCL (ref 4.5–5.9)
SODIUM SERPL-SCNC: 140 MMOL/L (ref 136–145)
WBC # SPEC AUTO: 10.3 X10(3)/MCL (ref 4.5–11)

## 2021-08-06 ENCOUNTER — HISTORICAL (OUTPATIENT)
Dept: CARDIOLOGY | Facility: CLINIC | Age: 66
End: 2021-08-06

## 2021-08-06 LAB
BUN SERPL-MCNC: 20.1 MG/DL (ref 8.4–25.7)
CALCIUM SERPL-MCNC: 9.4 MG/DL (ref 8.8–10)
CHLORIDE SERPL-SCNC: 108 MMOL/L (ref 98–107)
CO2 SERPL-SCNC: 26 MMOL/L (ref 23–31)
CREAT SERPL-MCNC: 1.43 MG/DL (ref 0.73–1.18)
CREAT/UREA NIT SERPL: 14
GLUCOSE SERPL-MCNC: 103 MG/DL (ref 82–115)
MAGNESIUM SERPL-MCNC: 1.8 MG/DL (ref 1.6–2.6)
POTASSIUM SERPL-SCNC: 3.9 MMOL/L (ref 3.5–5.1)
SODIUM SERPL-SCNC: 141 MMOL/L (ref 136–145)

## 2021-08-26 ENCOUNTER — HISTORICAL (OUTPATIENT)
Dept: CARDIOLOGY | Facility: HOSPITAL | Age: 66
End: 2021-08-26

## 2021-08-26 LAB
BUN SERPL-MCNC: 19.1 MG/DL (ref 8.4–25.7)
CALCIUM SERPL-MCNC: 9.5 MG/DL (ref 8.8–10)
CHLORIDE SERPL-SCNC: 110 MMOL/L (ref 98–107)
CO2 SERPL-SCNC: 18 MMOL/L (ref 23–31)
CREAT SERPL-MCNC: 1.62 MG/DL (ref 0.73–1.18)
CREAT/UREA NIT SERPL: 12
GLUCOSE SERPL-MCNC: 110 MG/DL (ref 82–115)
MAGNESIUM SERPL-MCNC: 1.9 MG/DL (ref 1.6–2.6)
POTASSIUM SERPL-SCNC: 4.5 MMOL/L (ref 3.5–5.1)
SODIUM SERPL-SCNC: 138 MMOL/L (ref 136–145)

## 2022-01-07 ENCOUNTER — HISTORICAL (OUTPATIENT)
Dept: RADIOLOGY | Facility: HOSPITAL | Age: 67
End: 2022-01-07

## 2022-01-07 LAB — CREAT SERPL-MCNC: 1.74 MG/DL (ref 0.73–1.18)

## 2022-04-10 ENCOUNTER — HISTORICAL (OUTPATIENT)
Dept: ADMINISTRATIVE | Facility: HOSPITAL | Age: 67
End: 2022-04-10
Payer: MEDICARE

## 2022-04-23 ENCOUNTER — HISTORICAL (OUTPATIENT)
Dept: ADMINISTRATIVE | Facility: HOSPITAL | Age: 67
End: 2022-04-23
Payer: MEDICARE

## 2022-04-29 VITALS
OXYGEN SATURATION: 100 % | BODY MASS INDEX: 34.18 KG/M2 | WEIGHT: 230.81 LBS | SYSTOLIC BLOOD PRESSURE: 120 MMHG | HEIGHT: 69 IN | DIASTOLIC BLOOD PRESSURE: 81 MMHG

## 2022-04-30 NOTE — PROGRESS NOTES
Spoke to Mr Daniel and informed him to hold Eliquis 12/25,12/26 for 12/27 procedure. Good comprehension

## 2022-04-30 NOTE — ED PROVIDER NOTES
Patient:   Delio Daniel Jr             MRN: [id number]            FIN: [id number]               Age:   63 years     Sex:  Male     :  1955   Associated Diagnoses:   SBO (small bowel obstruction)   Author:   Matt Beal      Basic Information   Time seen: Immediately upon arrival.   History source: Patient.   Arrival mode: Private vehicle, walking.   History limitation: None.   Additional information: Chief Complaint from Nursing Triage Note : Chief Complaint   2019 14:56 CDT      Chief Complaint           mid abd pain onset last night, denies n/v/d. 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 denies urinary symptoms. AAOx.4, resp even unlabored. NAD  .      History of Present Illness   The patient presents with abdominal pain.  The onset was 1  days ago.  The course/duration of symptoms is constant.  The character of symptoms is dull.  The degree at onset was minimal.  The Location of pain at onset was diffuse and abdominal.  The degree at present is moderate.  The Location of pain at present is diffuse and abdominal.  Radiating pain: none. The exacerbating factor is eating.  The relieving factor is none.  Therapy today: none.  Risk factors consist of hx of colon resection  mass 2018, chronic afib.  Associated symptoms: none.  normal bm yesterday.        Review of Systems   Constitutional symptoms:  Negative except as documented in HPI, no fever, no chills, no sweats, no weakness, no fatigue.    Skin symptoms:  Negative except as documented in HPI.   Respiratory symptoms:  Negative except as documented in HPI, no shortness of breath, no orthopnea, no cough.    Cardiovascular symptoms:  Negative except as documented in HPI, no chest pain, no palpitations, no tachycardia, no syncope, no diaphoresis, no peripheral edema.    Gastrointestinal symptoms:  Negative except as documented in HPI.   Genitourinary symptoms:  Negative except as documented in HPI.   Musculoskeletal symptoms:  Negative  except as documented in HPI, no back pain, no Muscle pain, no Joint pain.    Neurologic symptoms:  Negative except as documented in HPI.   Psychiatric symptoms:  Negative except as documented in HPI.             Additional review of systems information: All other systems reviewed and otherwise negative.      Health Status   Allergies:    Allergic Reactions (Selected)  No Known Allergies,    Allergies (1) Active Reaction  No Known Allergies None Documented.   Medications:  (Selected)   Inpatient Medications  Ordered  IVF Normal Saline NS Bolus 1000ml 1,000 mL: 1,000 mL, 1,000 mL, IV, Bolus, start date 05/26/19 16:11:00 CDT  heparin: 1.41 mL, 7,048 units =, form: Injection, IV Push, Once, first dose 03/08/18 8:00:00 CST, stop date 03/08/18 8:00:00 CST, initial Bolus  Prescriptions  Prescribed  Aldactone 25 mg oral tablet: 25 mg = 1 tab(s), Oral, BID, # 180 tab(s), 3 Refill(s), Pharmacy: [facility]  Promethazine DM 6.25 mg-15 mg/5 mL oral syrup: 5 mL, Oral, q6hr, PRN PRN for cough, # 120 mL, 0 Refill(s), Pharmacy: [facility]  acetaminophen-codeine 300 mg-30 mg oral tablet.: 1 tab(s), Oral, q4hr, PRN PRN severe pain, not to exceed 4000 mg acetaminophen per day, will cause drowsiness, # 12 tab(s), 0 Refill(s), Pharmacy: [facility]  amlodipine 10 mg oral tablet: 10 mg = 1 tab(s), Oral, Daily, # 90 tab(s), 3 Refill(s), Pharmacy: [facility]  aspirin 81 mg oral tablet, CHEWABLE: 81 mg = 1 tab(s), Oral, Daily, # 90 tab(s), 3 Refill(s), Pharmacy: [facility]  atorvastatin 40 mg oral tablet: 40 mg = 1 tab(s), Oral, Daily, # 90 tab(s), 3 Refill(s), Pharmacy: [facility]  diltiazem 180 mg/24 hours oral TABlet, extended release: 180 mg = 1 tab(s), Oral, Daily, # 90 tab(s), 3 Refill(s), Pharmacy: [facility]  lisinopril 40 mg oral tablet: 40 mg = 1 tab(s), Oral, Daily, # 90 tab(s), 3 Refill(s), Pharmacy: [facility]  methocarbamol  500 mg oral tablet: 1,000 mg = 2 tab(s), Oral, TID, PRN PRN other (see comment), as needed for neck/back pain/spasms, # 30 tab(s), 0 Refill(s), Pharmacy: [facility]  metoprolol tartrate 25 mg oral tab: 25 mg = 1 tab(s), Oral, BID, # 180 tab(s), 3 Refill(s), Pharmacy: [facility]  potassium chloride 20 mEq oral TABLET extended release: 20 mEq = 1 tab(s), Oral, Daily, # 30 tab(s), 9 Refill(s), Pharmacy: [facility]  Documented Medications  Documented  Xarelto 20mg Tablet: 20 mg = 1 tab(s), Oral, Daily.      Past Medical/ Family/ Social History   Medical history:    Active  HTN (hypertension) ([id number])  CAD - Coronary artery disease ([id number])  Hyperlipidemia ([id number])  Atrial fibrillation ([id number]).   Surgical history:    Colonoscopy (None) performed by Marielos Day MD on 12/27/2018 at 63 Years.  Comments:  12/27/2018 11:26 - Naima Ocasio RN  auto-populated from documented surgical case  Polypectomy (None) performed by Marielos Day MD on 12/27/2018 at 63 Years.  Comments:  12/27/2018 11:Mounika - Naima Ocasio RN  auto-populated from documented surgical case  Exploration Laparotomy (None) performed by Christina Pemberton MD on 11/2/2018 at 63 Years.  Comments:  11/2/2018 09:Nu - Huong Zaldivar  auto-populated from documented surgical case  Small Bowel Resection (None) performed by Christina Pemberton MD on 11/2/2018 at 63 Years.  Comments:  11/2/2018 09:Huong Scuhler  auto-populated from documented surgical case  Diagnostic Laparoscopic (None) performed by Christina Pemberton MD on 11/2/2018 at 63 Years.  Comments:  11/2/2018 09:Huong Schuler  auto-populated from documented surgical case  Angiogram (SNOMED CT [id number]).  Pacemaker..   Family history:    Stroke  Father  Mother  .   Social history:    Social & Psychosocial Habits    Alcohol  10/22/2018  Use: Never    Employment/School  03/24/2015  Status: Employed     Activity level: Moderate physical work    Highest education: High school    Comment: graduated high school - 03/24/2015 15:23 - Gonsalo Landers LPN    Home/Environment  03/24/2015  Lives with: lives with sister    Living situation: Home/Independent    Alcohol abuse in household: No    Substance abuse in household: No    Smoker in household: No    Injuries/Abuse/Neglect in household: No    Feels unsafe at home: No    Family/Friends available to help: Yes    Concern for family members at home: No    Major illness in household: No    Financial concerns: No    Concerns over TV/Computer/Game use: No    Comment: has 9 daciahildren - 03/24/2015 15:24 - Gonsalo Landers LPN    Nutrition/Health  01/16/2018  Diet description: cardiac    Type of diet: cardiac    Caffeine intake amount: caffeine free mostly    Wants to lose weight: Yes    Sleeping concerns: No    Feels highly stressed: No    Sexual  02/28/2019  What is your current gender identity? (Check all that apply) Identifies as male    Substance Abuse  10/22/2018  Use: Never    Tobacco  03/19/2019  Use: Former smoker, quit more    Patient Wants Consult For Cessation Counseling N/A    Comment: stopped in 1979. - 03/19/2019 07:47 - Laureen Finch RN    05/13/2019  Use: Never (less than 100 in l    Patient Wants Consult For Cessation Counseling N/A    05/26/2019  Use: Former smoker, quit more    Patient Wants Consult For Cessation Counseling N/A, Reviewed as documented in chart.   Problem list:    Active Problems (12)  Atrial fibrillation   CAD - Coronary artery disease   Cardiac arrest with ventricular fibrillation   Cough   HTN (hypertension)   Hyperlipidemia   Hypertension   Impaired mobility   Impaired mobility   Knowledge deficit   MI - Myocardial infarction   Obesity .      Physical Examination               Vital Signs   Vital Signs   5/26/2019 18:15 CDT      Peripheral Pulse Rate     91 bpm                             SpO2                      98 %                              Oxygen Therapy            Room air    5/26/2019 17:57 CDT      Peripheral Pulse Rate     103 bpm  HI                             SpO2                      99 %                             Oxygen Therapy            Room air                             Systolic Blood Pressure   150 mmHg  HI                             Diastolic Blood Pressure  126 mmHg  HI                             Mean Arterial Pressure, Cuff              134 mmHg    5/26/2019 16:38 CDT      Peripheral Pulse Rate     90 bpm                             SpO2                      99 %                             Oxygen Therapy            Room air                             Systolic Blood Pressure   148 mmHg  HI                             Diastolic Blood Pressure  116 mmHg  HI                             Mean Arterial Pressure, Cuff              127 mmHg    5/26/2019 14:56 CDT      Temperature Oral          36.3 DegC                             Temperature Oral (calculated)             97.34 DegF                             Peripheral Pulse Rate     92 bpm                             Respiratory Rate          18 br/min                             SpO2                      99 %                             Systolic Blood Pressure   146 mmHg  HI                             Diastolic Blood Pressure  95 mmHg  HI  .      Vital Signs (last 24 hrs)_____  Last Charted___________  Temp Oral     36.3 DegC  (MAY 26 14:56)  Heart Rate Peripheral   91 bpm  (MAY 26 18:15)  Resp Rate         18 br/min  (MAY 26 14:56)  SBP      H 150mmHg  (MAY 26 17:57)  DBP      H 126mmHg  (MAY 26 17:57)  SpO2      98 %  (MAY 26 18:15)  Weight      97 kg  (MAY 26 14:56).   Measurements   5/26/2019 14:56 CDT      Weight Dosing             97 kg                             Weight Measured           97 kg                             Weight Measured and Calculated in Lbs     213.85 lb                             Height/Length Dosing      180 cm                              Height/Length Estimated   180 cm  .   Basic Oxygen Information   5/26/2019 18:15 CDT      SpO2                      98 %                             Oxygen Therapy            Room air    5/26/2019 17:57 CDT      SpO2                      99 %                             Oxygen Therapy            Room air    5/26/2019 16:38 CDT      SpO2                      99 %                             Oxygen Therapy            Room air    5/26/2019 14:56 CDT      SpO2                      99 %  .   General:  Alert, no acute distress.    Skin:  Warm, dry, pink, intact, no pallor.    Head:  Normocephalic.   Neck:  Supple, trachea midline, no tenderness.    Cardiovascular:  Regular rate and rhythm, No murmur, Normal peripheral perfusion, No edema.    Respiratory:  Lungs are clear to auscultation, respirations are non-labored, breath sounds are equal, Symmetrical chest wall expansion.    Chest wall:  No tenderness, No deformity.    Back:  Nontender, Normal range of motion.    Musculoskeletal:  Normal ROM, normal strength, no tenderness, no swelling, no deformity.    Gastrointestinal:  Abdominal distention, Tenderness: Moderate, Guarding: Minimal, Rebound: Negative, Bowel sounds: Quiet.    Neurological:  Alert and oriented to person, place, time, and situation.   Lymphatics:  No lymphadenopathy.   Psychiatric:  Cooperative, appropriate mood & affect.       Medical Decision Making   Documents reviewed:  Emergency department nurses' notes.   Electrocardiogram:  Time 5/26/2019 15:24:00, rate 93, no ectopy, EP Interp, AFib, right axis deviation, reviewed by Dr Estrada (ED staff).    Results review:  Lab results : Lab View   5/26/2019 17:07 CDT      UA Appear                 Clear                             UA Color                  LIGHT YELLOW                             UA Spec Grav              1.027                             UA Bili                   Negative                             UA pH                     6.5                              UA Urobilinogen           Normal                             UA Blood                  Negative                             UA Glucose                Normal                             UA Ketones                Negative                             UA Protein                Negative                             UA Nitrite                Negative                             UA Leuk Est               Negative                             UA WBC Interp             0-2 /HPF                             UA RBC Interp             3-5                             UA Bact Interp            None Seen                             UA Squam Epi Interp       2-20                             UA Hyal Cast Interp       0-2    5/26/2019 15:20 CDT      Sodium Lvl                141 mmol/L                             Potassium Lvl             3.5 mmol/L                             Chloride                  108 mmol/L  HI                             CO2                       29 mmol/L                             Calcium Lvl               8.7 mg/dL                             Glucose Lvl               115 mg/dL  HI                             BUN                       14 mg/dL                             Creatinine                1.10 mg/dL                             eGFR-AA                   87 mL/min  LOW                             eGFR-SAJI                  72 mL/min  LOW                             Amylase Lvl               67 unit/L                             Bili Total                1.3 mg/dL  HI                             Bili Direct               0.3 mg/dL                             Bili Indirect             1.0 mg/dL                             AST                       31 unit/L                             ALT                       45 unit/L                             Alk Phos                  94 unit/L                             Total Protein             8.1 gm/dL                             Albumin Lvl                4.1 gm/dL                             Globulin                  4.00 gm/mL  HI                             A/G Ratio                 1.0 ratio  LOW                             Lipase Lvl                110 unit/L                             WBC                       7.6 x10(3)/mcL                             RBC                       5.27 x10(6)/mcL                             Hgb                       15.3 gm/dL                             Hct                       45.4 %                             Platelet                  250 x10(3)/mcL                             MCV                       86.1 fL                             MCH                       29.0 pg                             MCHC                      33.7 gm/dL                             RDW                       13.8 %                             MPV                       10.0 fL                             Abs Neut                  4.47 x10(3)/mcL                             Neutro Auto               59 x10(3)/mcL  NA                             Lymph Auto                30 %                             Mono Auto                 8 %                             Eos Auto                  2  NA                             Abs Eos                   0.12  NA                             Basophil Auto             1  NA                             Abs Neutro                4.47 x10(3)/mcL  NA                             Abs Lymph                 2.23 x10(3)/mcL  NA                             Abs Mono                  0.64 x10(3)/mcL  NA                             Abs Baso                  0.07 x10(3)/mcL  NA                             IG%                       0 %  NA                             IG#                       0.0400  NA  .   Radiology results:  Rad Results (ST)  < 12 hrs   Accession: [id number]  Order: CT Abdomen and Pelvis W Contrast  Report Dt/Tm: 05/26/2019 17:20  Report:   EXAMINATION: CT the abdomen pelvis with contrast      EXAMINATION DATE: 5/26/2019     COMPARISON: 12/26/2018     TECHNIQUE: Multiple cross-sectional images of the abdomen and pelvis  were obtained at the intravenous ministration of contrast. Sagittal  reformatted images were obtained.     CLINICAL HISTORY: Abdominal pain     FINDINGS:     Dependent atelectatic changes lungs. The heart size is enlarged with  coronary artery calcifications.     The liver is enlarged with diffusely hypodense parenchyma hepatic  steatosis., Adrenals, and kidneys are normal. Gallbladder is present  with cholelithiasis. The pancreas appears normal without hypervascular  mass.     Dilated loops of small bowel the the left upper quadrant with the  transition zone identified on image #36 of series 6 is likely  secondary to small bowel obstruction with mechanical obstruction.  Fecalization is identified. No evidence for pneumatosis or  pneumoperitoneum. The distal small bowel is of normal  caliber/decompressed. Postsurgical changes are identified small bowel  resection with intact anastomosis. Colon is also normal caliber. The  appendix is air-filled and normal.     The prostate is enlarged with dystrophic calcifications. The bladder  is underdistended normal. No free fluid in abdomen pelvis. No  lymphadenopathy. Previously described mesenteric lesion is again  identified and smaller. Previously described mesenteric lesion is  smaller when compared to the prior examination now with maximum  dimension of 1.5 x 0.9 cm. This is best identified and 47 of series 2.  Course and caliber of the abdominal aorta is normal. Degenerative  changes of the lumbar spine. No cyst     IMPRESSION:   1. Developing small bowel obstruction of the left upper quadrant with  transition point identified as above.  2. Decreasing mesenteric lesion when compared to prior examination.  3. Other secondary findings are above.    .      Impression and Plan   Diagnosis   SBO (small bowel obstruction) ([id number] K56.609)       Calls-Consults   -  consulted Dr Piper (surgery).   Plan   Disposition: Admit time  5/26/2019 17:45:00, Admit to Surgery.    Notes: I, Dr. Suman Estrada, performed a face to face evaluation of this Delio Daniel Jr and my history reveal a1 day of diffuse abdominal pain which is worse with eating, non-radiating, history of colon resection as a result of abdominal mass in november 2018. and my physical exam reveal diffuse abdominal tenderness with . This case was initially evaluated by Matt Lyons under my supervision and I agree with his/her documentation, procedures and plan. I perdsonally performed the history, physican and MDM for this patient. .

## 2022-04-30 NOTE — ED PROVIDER NOTES
Patient:   Delio Daniel Jr            MRN: [id number]            FIN: [id number]               Age:   65 years     Sex:  Male     :  1955   Associated Diagnoses:   Acute kidney injury   Author:   Tia Fuller PA-C      Basic Information   Time seen: Immediately upon arrival.   History source: Patient.   Arrival mode: Private vehicle.   History limitation: None.   Additional information: Chief Complaint from Nursing Triage Note : Chief Complaint   2021 13:21 CDT       Chief Complaint           c/o weakness while sitting in the sun waiting on the bus  .   Provider/Visit info:   Time Seen:  Tia Fuller PA-C / 2021 14:21  .   History of Present Illness   The patient presents with weakness and fatigue.  The onset was just prior to arrival.  The course/duration of symptoms is constant.  The character of symptoms is generalized.  The degree at present is minimal.  Risk factors consist of coronary artery disease and hypertension.  Prior episodes: none.  Therapy today: none.  Associated symptoms: dizziness, denies chest pain, denies abdominal pain, denies nausea, denies vomiting and denies shortness of breath.    66 y/o AAM with a PMHx of HTN, HLD, CAD (STEMI in ), A fib on Xarelto, CHF with 55% EF, second-degree AV block with pacemaker presents to the ED with complaints of weakness x prior to arrival. States he went to the bank and was feeling fine and was waiting on the bench for the bus and began to feel very weak. He states he sat there for some time and when his symptoms did not resolve, he came to the ED. Denies shortness of breath, chest pain, nausea, vomiting, diarrhea, dysuria.    .        Review of Systems   Constitutional symptoms:  Weakness, fatigue, no fever, no chills.    Skin symptoms:  No rash,    ENMT symptoms:  No ear pain, no sore throat, no nasal congestion, no sinus pain.    Respiratory symptoms:  No shortness of breath, no cough, no wheezing.     Cardiovascular symptoms:  No chest pain,    Gastrointestinal symptoms:  No abdominal pain, no nausea, no vomiting, no diarrhea.    Genitourinary symptoms:  No dysuria,    Musculoskeletal symptoms:  No back pain, no Muscle pain, no Joint pain.    Neurologic symptoms:  Weakness, no headache, no dizziness.              Additional review of systems information: All other systems reviewed and otherwise negative.      Health Status   Allergies:    Allergic Reactions (All)  No Known Allergies,    Allergies (1) Active Reaction  No Known Allergies None Documented  .      Past Medical/ Family/ Social History   Medical history:    Active  HTN (hypertension) ([id number])  CAD - Coronary artery disease ([id number])  Hyperlipidemia (34438037)  Atrial fibrillation (87030134)  Diverticulosis of colon (3803295670)  BPH - benign prostatic hyperplasia (1182617694)  Steatosis of liver (160411579)  Resolved  Cyst of kidney (6458772731):  Resolved., Reviewed as documented in chart.   Surgical history:    Colonoscopy (None) on 2018 at 63 Years.  Comments:  2018 11:26 Naima Shelley RN  auto-populated from documented surgical case  Polypectomy (None) on 2018 at 63 Years.  Comments:  2018 11:26 Naima Shelley RN  auto-populated from documented surgical case  Exploration Laparotomy (None) on 2018 at 63 Years.  Comments:  2018 9:45 Huong Beltre  auto-populated from documented surgical case  Small Bowel Resection (None) on 2018 at 63 Years.  Comments:  2018 9:45 Huong Beltre  auto-populated from documented surgical case  Diagnostic Laparoscopic (None) on 2018 at 63 Years.  Comments:  2018 9:45 Huong Beltre  auto-populated from documented surgical case  Angiogram ([id number]).  Pacemaker., Reviewed as documented in chart.   Family history:    Stroke  Father ()  Mother ()  Hypertension.  Father ()  Mother  ()  Intracerebral aneurysm......  Father ()  , Reviewed as documented in chart.   Social history:    Social & Psychosocial Habits    Alcohol  2021  Use: Past    Employment/School  10/19/2020  Status: Disabled    Exercise    Comment: DENIES - 10/19/2020 13:37 - [employer], [employer]    Home/Environment  10/19/2020  Lives with: Siblings    Living situation: Home/Independent    Home equipment: Walker/Cane    Home Type Single family house    Nutrition/Health  10/19/2020  Home Diet Regular    Appetite Good    Sexual  2019  What is your current gender identity? (Check all that apply) Identifies as male    Substance Use  2021  Use: Past    Type: Marijuana    Has drug use interfered with your work or home life? No    Concerns about substance abuse in household: No    Tobacco  2021  Use: Former smoker, quit more    Patient Wants Consult For Cessation Counseling N/A    2021  Use: Former smoker, quit more    Patient Wants Consult For Cessation Counseling N/A    Abuse/Neglect  2021  SHX Any signs of abuse or neglect No    Feels unsafe at home: No    Safe place to go: Yes    2021  SHX Any signs of abuse or neglect No    Feels unsafe at home: No    Safe place to go: Yes    Spiritual/Cultural  2021  Taoist Preference pentecstal  , Reviewed as documented in chart.   Problem list:    Active Problems (15)  -nCoV   Atrial fibrillation   BPH - benign prostatic hyperplasia   CAD - Coronary artery disease   Cardiac arrest with ventricular fibrillation   Cough   Diverticulosis of colon   HTN (hypertension)   Hyperlipidemia   Hypertension   Impaired mobility   Knowledge deficit   MI - Myocardial infarction   Obesity   Steatosis of liver   .      Physical Examination               Vital Signs   Vital Signs   2021 15:12 CDT       Peripheral Pulse Rate     75 bpm                             Respiratory Rate          16 br/min                             SpO2                       100 %    7/2/2021 13:21 CDT       Temperature Oral          37 DegC                             Temperature Oral (calculated)             98.60 DegF                             Peripheral Pulse Rate     86 bpm                             Respiratory Rate          17 br/min                             SpO2                      98 %                             Oxygen Therapy            Room air                             Systolic Blood Pressure   139 mmHg                             Diastolic Blood Pressure  81 mmHg  .      Vital Signs (last 24 hrs)_____  Last Charted___________  Temp Oral     37 DegC  (JUL 02 13:21)  Heart Rate Peripheral   75 bpm  (JUL 02 15:12)  Resp Rate         16 br/min  (JUL 02 15:12)  SBP      139 mmHg  (JUL 02 13:21)  DBP      81 mmHg  (JUL 02 13:21)  SpO2      100 %  (JUL 02 15:12)  Weight      122 kg  (JUL 02 13:21)  .   Measurements   7/2/2021 13:21 CDT       Weight Dosing             122 kg                             Weight Measured           122 kg                             Weight Measured and Calculated in Lbs     268.96 lb                             Height/Length Dosing      175.3 cm                             Height/Length Estimated   175.3 cm  .   Basic Oxygen Information   7/2/2021 15:12 CDT       SpO2                      100 %    7/2/2021 13:21 CDT       SpO2                      98 %                             Oxygen Therapy            Room air  .   General:  Alert, no acute distress.    Skin:  Warm, intact, normal for ethnicity.    Head:  Normocephalic, atraumatic.    Neck:  Supple, trachea midline, no tenderness, no JVD.    Eye:  Pupils are equal, round and reactive to light.   Cardiovascular:  Regular rate and rhythm, No murmur, Normal peripheral perfusion, No edema.    Respiratory:  Lungs are clear to auscultation, respirations are non-labored, breath sounds are equal, Symmetrical chest wall expansion.    Gastrointestinal:  Soft, Nontender, Non distended,  Normal bowel sounds.    Back:  Nontender, Normal range of motion.    Musculoskeletal:  Normal ROM, normal strength.    Neurological:  Alert and oriented to person, place, time, and situation, No focal neurological deficit observed.    Psychiatric:  Cooperative, appropriate mood & affect.       Medical Decision Making   Documents reviewed:  Emergency department nurses' notes.   Electrocardiogram:  Time 7/2/2021 15:45:00, rate 74, The Rhythm is atrial fibrillation and with frequent ventricular-paced complexes.  , STT segments Non specific changes.    Results review:  Lab results : [facility]   7/2/2021 16:24 CDT       Est Creat Clearance Ser   34.41 mL/min    7/2/2021 15:48 CDT       Sodium Lvl                140 mmol/L                             Potassium Lvl             4.0 mmol/L                             Chloride                  105 mmol/L                             CO2                       24 mmol/L                             Calcium Lvl               9.8 mg/dL                             Magnesium Lvl             2.00 mg/dL                             Glucose Lvl               91 mg/dL                             BUN                       22.4 mg/dL                             Creatinine                2.17 mg/dL  HI                             eGFR-AA                   40  LOW                             eGFR-SAJI                  33 mL/min/1.73 m2  LOW                             Bili Total                2.3 mg/dL  HI                             Bili Direct               0.4 mg/dL                             Bili Indirect             1.90 mg/dL  HI                             AST                       22 unit/L                             ALT                       28 unit/L                             Alk Phos                  70 unit/L                             Total Protein             8.3 gm/dL  HI                             Albumin Lvl               4.4 gm/dL                             Globulin                   3.9 gm/dL  HI                             A/G Ratio                 1.1 ratio                             Lipase Lvl                39 U/L                             Total CK                  296 U/L  HI                             Troponin-I                <0.010 ng/mL                             WBC                       10.5 x10(3)/mcL                             RBC                       5.11 x10(6)/mcL                             Hgb                       15.3 gm/dL                             Hct                       45.3 %                             Platelet                  246 x10(3)/mcL                             MCV                       88.6 fL                             MCH                       29.9 pg                             MCHC                      33.8 gm/dL                             RDW                       14.5 %                             MPV                       9.9 fL                             Abs Neut                  7.14 x10(3)/mcL                             Neutro Auto               68 %  NA                             Lymph Auto                21 %  NA                             Mono Auto                 9 %  NA                             Eos Auto                  0 %  NA                             Abs Eos                   0.0 x10(3)/mcL                             Basophil Auto             1 %  NA                             Abs Neutro                7.14 x10(3)/mcL                             Abs Lymph                 2.2 x10(3)/mcL                             Abs Mono                  1.0 x10(3)/mcL                             Abs Baso                  0.1 x10(3)/mcL                             NRBC%                     0.0 %                             IG%                       1 %  NA                             IG#                       0.080  NA                             UA Appear                 Clear                             UA Color                   LIGHT YELLOW                             UA Spec Grav              1.011                             UA Bili                   Negative                             UA pH                     5.5                             UA Urobilinogen           Normal                             UA Blood                  0.06 mg/dL                             UA Glucose                Negative                             UA Ketones                Negative                             UA Protein                10 mg/dL                             UA Nitrite                Negative                             UA Leuk Est               Negative    ,    Labs (Last four charted values)  WBC                  10.5 (JUL 02)   Hgb                  15.3 (JUL 02)   Hct                  45.3 (JUL 02)   Plt                  246 (JUL 02)   Na                   140 (JUL 02)   K                    4.0 (JUL 02)   CO2                  24 (JUL 02)   Cl                   105 (JUL 02)   Cr                   H 2.17 (JUL 02)   BUN                  22.4 (JUL 02)   Glucose Random       91 (JUL 02) .    Notes:  Case discussed with Dr. Mckeon who will perform a face to face evaluation and final disposition for this patient. .      Reexamination/ Reevaluation   Time: 7/2/2021 16:35:00 .   Vital signs   Basic Oxygen Information   7/2/2021 15:12 CDT       SpO2                      100 %    7/2/2021 13:21 CDT       SpO2                      98 %                             Oxygen Therapy            Room air     Course: progressing as expected.   Notes: Reassessed patient multiple times throughout his stay in the ED. He has remained in NAD. Discussed lab results and the need for admission. I will consult IM at this time for admission. He verbalized understanding. .      Procedure   Critical care note   Total time: 30 minutes spent engaged in work directly related to patient care and/ or available for direct patient care.   Critical condition(s)  addressed for impending deterioration include: metabolic, renal.   Associated risk factors: dehydration.   Management: bedside assessment, supervision of care, Interpretation (chest x-ray, electrocardiogram), Interventions hemodynamic management, Case review medical specialist.   Performed by: self.      Impression and Plan   Diagnosis   Acute kidney injury (RGE00-XL N17.9)      Calls-Consults   -  7/2/2021 16:50:00 , Juan David BUNCH, Humberto PUENTE, phone call, recommends Will come to ED for evaluation and admission. See note..    Plan   Condition: Stable.    Disposition: Admit time  7/2/2021 17:00:00, Admit to Inpatient Unit.    Counseled: Patient, Regarding diagnosis, Regarding diagnostic results, Regarding treatment plan, Regarding prescription, Patient indicated understanding of instructions.       Addendum      Teaching-Supervisory Addendum-Brief   I participated in the following activities of this patients care: the medical history, the physical exam, medical decision making, the procedure.   I personally performed: supervision of the patient's care, the medical history, the medical decision making.   Procedures: I was present for key portions of the procedure and was immediately available for the non-key portions.   Evaluation and management service: I agree with the evaluation and management decisions made in this patient's care.   Results interpretation: I agree with the documentation of the study interpretation.   Notes: I, ROE PerezP, performed a face to face evaluation of this Pt Delio Daniel  and my physical exam is unremarkable/history of multiple health problems among CAD and CHF presents with weakness after prolonged heat exposure. This case was initially evaluated by CARMEN YORK) under my supervision and I agree with her documentation, procedures and plan. labs reveal elevated creatinine consistent with BRIAN for which will admit for hydration and nephrology evaluation. I personally performed the  history, imaging and EKG interpretation, Critical care procedure, MDM and admission procedure for this patient. .   The case was discussed with.

## 2022-04-30 NOTE — ED PROVIDER NOTES
"   Patient:   Delio Daniel Jr             MRN: [id number]            FIN: [id number]               Age:   64 years     Sex:  Male     :  1955   Associated Diagnoses:   Weakness   Author:   Jone Hawthorne MD      Basic Information   Additional information: Chief Complaint from Nursing Triage Note : Chief Complaint   2020 9:14 CST        Chief Complaint           weakness this am with episode of dizziness, hx a fib,  .      History of Present Illness   The patient presents with weakness and fatigue.  The onset was just prior to arrival.  The course/duration of symptoms is resolved.  The character of symptoms is generalized and "tired".  The degree at present is none.  Risk factors consist of coronary artery disease, hypertension, smoking and age.  Prior episodes: occasional.  Therapy today: none.  Associated symptoms: none.  Additional history: none Patient states that this morning he felt tired and weak.  There were no focal neurologic deficits.  He was able to ambulate without difficulty.  No change in speech no headache no recent head trauma.  Patient was discharged home as he was asymptomatic while he was in the men's restroom he felt weak again and signed back in.  EKG shows atrial fibrillation rate is controlled which is chronic vital signs are stable there are no new deficits noted on exam.        Review of Systems   Constitutional symptoms:  Weakness, no fever, no sweats, no fatigue, no decreased activity.    Skin symptoms:  Negative except as documented in HPI.   Eye symptoms:  Negative except as documented in HPI.   ENMT symptoms:  Negative except as documented in HPI.   Respiratory symptoms:  Negative except as documented in HPI.   Cardiovascular symptoms:  Negative except as documented in HPI.   Gastrointestinal symptoms:  Negative except as documented in HPI.   Genitourinary symptoms:  Negative except as documented in HPI.   Musculoskeletal symptoms:  Negative except as documented in HPI. "   Neurologic symptoms:  Negative except as documented in HPI.   Psychiatric symptoms:  Negative except as documented in HPI.   Endocrine symptoms:  Negative except as documented in HPI.   Hematologic/Lymphatic symptoms:  Negative except as documented in HPI.   Allergy/immunologic symptoms:  Negative except as documented in HPI.             Additional review of systems information: All other systems reviewed and otherwise negative.      Health Status   Allergies:    Allergic Reactions (Selected)  No Known Allergies,    Allergies (1) Active Reaction  No Known Allergies None Documented  .   Medications:  (Selected)   Inpatient Medications  Ordered  heparin: 1.41 mL, 7,048 units =, form: Injection, IV Push, Once, first dose 03/08/18 8:00:00 CST, stop date 03/08/18 8:00:00 CST, initial Bolus  Prescriptions  Prescribed  Aldactone 25 mg oral tablet: 25 mg = 1 tab(s), Oral, BID, # 180 tab(s), 3 Refill(s), Pharmacy: [facility]  Cardizem  mg/24 hours oral TABlet, extended release: See Instructions, TAKE ONE TABLET BY MOUTH DAILY, # 30 tab(s), 6 Refill(s), Pharmacy: [facility]  Metoprolol Tartrate 25 mg oral tablet: See Instructions, TAKE ONE TABLET BY MOUTH TWO TIMES A DAY, # 60 tab(s), 6 Refill(s), Pharmacy: [facility]  Vitamin D 50,000 intl units oral capsule: 50,000 IntUnit = 1 cap(s), Oral, qWeek, # 4 cap(s), 0 Refill(s), Pharmacy: [facility]  Xarelto 20mg Tablet: 20 mg = 1 tab(s), Oral, Daily, # 30 tab(s), 6 Refill(s), Pharmacy: [facility]  amlodipine 10 mg oral tablet: See Instructions, TAKE ONE TABLET BY MOUTH DAILY, # 30 tab(s), 6 Refill(s), Pharmacy: [facility]  aspirin 81 mg oral tablet, CHEWABLE: 81 mg = 1 tab(s), Oral, Daily, # 90 tab(s), 3 Refill(s), Pharmacy: [facility]  atorvastatin 40 mg oral tablet: See Instructions, TAKE ONE TABLET BY MOUTH DAILY, # 30 tab(s), 6 Refill(s), Pharmacy: [facility]  losartan 100 mg oral tablet: 100 mg = 1 tab(s), Oral, Daily, # 90 tab(s), 3 Refill(s), Pharmacy: [facility], 175, cm, Height/Length Dosing, 01/14/20 8:18:00 CST, 111, kg, Weight Dosing, 01/14/20 8:18:00 CST  potassium chloride 20 mEq oral TABLET extended release: 20 mEq = 1 tab(s), Oral, Daily, # 30 tab(s), 9 Refill(s), Pharmacy: [facility]  Documented Medications  Documented  benzonatate 200 mg oral capsule: 200 mg = 1 cap(s), Oral, TID  dextromethorphan-promethazine 15 mg-6.25 mg/5 mL oral syrup: 5 mL, Oral, q6hr  dicyclomine 20 mg oral tablet: 20 mg = 1 tab(s), Oral, QID  lisinopril 40 mg oral tablet: 40 mg = 1 tab(s), Oral, Daily  losartan 50 mg oral tablet: 50 mg = 1 tab(s), Oral, Daily.      Past Medical/ Family/ Social History   Medical history:    Active  HTN (hypertension) ([id number])  CAD - Coronary artery disease ([id number])  Hyperlipidemia ([id number])  Atrial fibrillation ([id number]).   Surgical history:    Colonoscopy (None) on 12/27/2018 at 63 Years.  Comments:  12/27/2018 11:26 Naima Shelley RN  auto-populated from documented surgical case  Polypectomy (None) on 12/27/2018 at 63 Years.  Comments:  12/27/2018 11:26 Naima Shelley RN  auto-populated from documented surgical case  Exploration Laparotomy (None) on 11/2/2018 at 63 Years.  Comments:  11/2/2018 9:45 Huong Beltre  auto-populated from documented surgical case  Small Bowel Resection (None) on 11/2/2018 at 63 Years.  Comments:  11/2/2018 9:45 Huong Beltre  auto-populated from documented surgical case  Diagnostic Laparoscopic (None) on 11/2/2018 at 63 Years.  Comments:  11/2/2018 9:45 Huong Beltre  auto-populated from documented surgical case  Angiogram ([id number]).  Pacemaker..   Family history:    Stroke  Father  Mother  .   Social history:    Social & Psychosocial Habits    Alcohol  09/23/2019  Use: Never    Employment/School  03/24/2015  Status:  Employed    Activity level: Moderate physical work    Highest education: High school    Comment: graduated high school - 03/24/2015 15:23 - Gonsalo Landers LPN    Home/Environment  03/24/2015  Lives with: lives with sister    Living situation: Home/Independent    Alcohol abuse in household: No    Substance abuse in household: No    Smoker in household: No    Injuries/Abuse/Neglect in household: No    Feels unsafe at home: No    Family/Friends available to help: Yes    Concern for family members at home: No    Major illness in household: No    Financial concerns: No    Concerns over TV/Computer/Game use: No    Comment: has 9 daciahildren - 03/24/2015 15:24 - Gonsalo Landers LPN    Nutrition/Health  01/16/2018  Diet description: cardiac    Type of diet: cardiac    Caffeine intake amount: caffeine free mostly    Wants to lose weight: Yes    Sleeping concerns: No    Feels highly stressed: No    Sexual  02/28/2019  What is your current gender identity? (Check all that apply) Identifies as male    Substance Use  09/23/2019  Use: Never    Tobacco  02/02/2020  Use: Former smoker, quit more    Patient Wants Consult For Cessation Counseling N/A    Comment: Quit 1978 - 01/14/2020 08:17 - Brooke RN, Mary    Abuse/Neglect  02/02/2020  SHX Any signs of abuse or neglect No    Feels unsafe at home: No    Safe place to go: Yes  .   Problem list:    Active Problems (12)  Atrial fibrillation   CAD - Coronary artery disease   Cardiac arrest with ventricular fibrillation   Cough   HTN (hypertension)   Hyperlipidemia   Hypertension   Impaired mobility   Impaired mobility   Knowledge deficit   MI - Myocardial infarction   Obesity   .      Physical Examination               Vital Signs   Vital Signs   2/2/2020 11:22 CST       Peripheral Pulse Rate     84 bpm                             Respiratory Rate          16 br/min                             SpO2                      96 %                             Oxygen Therapy             Room air                             Systolic Blood Pressure   137 mmHg                             Diastolic Blood Pressure  98 mmHg  HI    2/2/2020 10:22 CST       Systolic Blood Pressure   128 mmHg                             Diastolic Blood Pressure  85 mmHg    2/2/2020 9:46 CST        Peripheral Pulse Rate     71 bpm                             Respiratory Rate          18 br/min  (Modified)                            SpO2                      98 %                             Oxygen Therapy            Room air    2/2/2020 9:20 CST        Oxygen Therapy            Room air    2/2/2020 9:14 CST        Temperature Oral          36.4 DegC                             Temperature Oral (calculated)             97.52 DegF                             Peripheral Pulse Rate     82 bpm                             Respiratory Rate          18 br/min                             SpO2                      100 %                             Systolic Blood Pressure   130 mmHg                             Diastolic Blood Pressure  89 mmHg  .      No qualifying data available.   Measurements   2/2/2020 9:14 CST        Weight Dosing             99 kg                             Weight Measured and Calculated in Lbs     218.26 lb                             Weight Estimated          99 kg                             Height/Length Dosing      175 cm                             Height/Length Estimated   175 cm                             Body Mass Index Estimated 32.33 kg/m2  .   Basic Oxygen Information   2/2/2020 11:22 CST       SpO2                      96 %                             Oxygen Therapy            Room air    2/2/2020 9:46 CST        SpO2                      98 %                             Oxygen Therapy            Room air    2/2/2020 9:20 CST        Oxygen Therapy            Room air    2/2/2020 9:14 CST        SpO2                      100 %  .   General:  Alert, no acute distress.    Skin:  Warm, moist, no pallor,  no rash, normal for ethnicity.    Head:  Normocephalic, atraumatic.    Neck:  Supple, trachea midline, no tenderness, no carotid bruit.    Eye:  Pupils are equal, round and reactive to light, extraocular movements are intact, normal conjunctiva.    Ears, nose, mouth and throat:  Tympanic membranes clear, oral mucosa moist, no pharyngeal erythema or exudate.    Cardiovascular:  Regular rate and rhythm, No murmur, Normal peripheral perfusion, No edema.    Respiratory:  Lungs are clear to auscultation, respirations are non-labored, breath sounds are equal, Symmetrical chest wall expansion.    Chest wall:  No tenderness, No deformity.    Back:  Nontender, Normal range of motion, Normal alignment, no step-offs.    Musculoskeletal:  Normal ROM, normal strength, no tenderness, no swelling, no deformity.    Gastrointestinal:  Soft, Nontender, Non distended, Normal bowel sounds, No organomegaly, stool is guaiac negative and  is OK.    Genitourinary:  No tenderness, no discharge, normal external genitalia, no lesions.    Neurological:  No focal neurological deficit observed, normal sensory observed, normal motor observed, normal speech observed, normal coordination observed, Level of consciousness: Appropriate for age, not slow, Cranial nerves II - XII: Intact.    Lymphatics:  No lymphadenopathy.   Psychiatric:  Cooperative, appropriate mood & affect, normal judgment, non-suicidal.       Medical Decision Making   Differential Diagnosis:  Weakness, dizziness, anemia, hypotension, dehydration, hypoglycemia, viral syndrome, influenza, pneumonia.    Rationale:  Patient has a history of chronic atrial fibrillation.  There are no focal motor neuro deficits on assessment in the emergency room lab work was within normal limits.  He also had a CT scan of the head and a chest x-ray, Patient was discharged home earlier he was able to Ros[place] without difficulty there were no focal neurologic deficits.  While in the restroom  in the lobby he felt weak and sign back in.  At present we will admit him to the medical service for observation and further evaluation.    Documents reviewed:  Emergency department nurses' notes.   Cardiac monitor:  Atrial fibrillation.   Electrocardiogram:  EKG reveals atrial fibrillation with a rate of 80 no ST segment changes.   Results review:     No qualifying data available.      Reexamination/ Reevaluation   Vital signs   Basic Oxygen Information   2/2/2020 11:22 CST       SpO2                      96 %                             Oxygen Therapy            Room air    2/2/2020 9:46 CST        SpO2                      98 %                             Oxygen Therapy            Room air    2/2/2020 9:20 CST        Oxygen Therapy            Room air    2/2/2020 9:14 CST        SpO2                      100 %        Impression and Plan   Diagnosis   Weakness ([id number] R53.1)   Plan   Condition   Disposition: Admit to Inpatient Unit.    Patient was given the following educational materials: Weakness.    Counseled: Patient, Regarding diagnosis, Regarding diagnostic results, Regarding treatment plan, Patient indicated understanding of instructions.

## 2022-05-03 NOTE — HISTORICAL [facility] CERNER
This is a historical note converted from Evelina. Formatting and pictures may have been removed.  Please reference Evelina for original formatting and attached multimedia. Admission Information  62 year old AAM with a history of A fib s/p pacemaker placement, CAD, HTN, HLD, and MI (2003) presents with a 2 week history of?non-progressive, constant?fatigue.? Today, patient was at his follow up appointment to have his staples removed from his pacemaker placement 11/13/17 when he was noted to have a HR in the 160s and low BP.? They recommended he come to the hospital.? Associated symptoms include burning, intermittent, mid-sternal chest pain after eating and a non-productive cough for 1 day.? He denies any SOB, abdominal pain, recent illness, nausea, vomiting, diarrhea, constipation, headache, or changes in vision.?  ?   MHx: atrial fibrillation, CAD, HTN, HLD, MI (2003)  SHx: angiogram 2003, pacemaker placed 11/13/17  FHx: father- stroke, DM; mother- pacemaker, stroke  Social: quit smoking in 1978, previously smoked 4 packs/day for >5 years; quit alcohol in 1978; occasionally smokes marijuana; denies illicit drug use  ?   HD2: Awake and alert, NAD. States to feel much better since admission. No longer feels heart racing. Is happy to know that he can avoid electric cardioversion  Hospital Course  Significant Findings  Last Month?  ??Lipid Profile: ?Hematology:   ?: ?() ?: ?()   ?: ?() ?: ?()   ?: ?() ?: ?()   ?LDL: 26 mg/dL (12/15/17) ?: ?()   ? ?INR: 1.04 ?(12/14/17)   ?  ?  Additional - Last Month?  ??A/G Ratio: 1 ratio (12/15/17) ?Abs Baso: 0.07 x10(3)/mcL (12/15/17) ?Abs Eos: 0.05 ?(12/15/17)   ?Abs Lymph: 1.99 x10(3)/mcL (12/15/17) ?Abs Mono: 0.49 x10(3)/mcL (12/15/17) ?Abs Neut: 2.20 x10(3)/mcL (12/15/17)   ?Abs Neutro: 2.20 x10(3)/mcL (12/15/17) ?Albumin Lvl: 3.6 gm/dL (12/15/17) ?Alk Phos: 80 unit/L (12/15/17)   ?ALT: 32 unit/L (12/15/17) ?AST: 20 unit/L (12/15/17) ?Basophil Auto: 2 % (12/15/17)   ?Bili Direct:  0.3 mg/dL (12/15/17) ?Bili Indirect: 0.7 mg/dL (12/15/17) ?Bili Total: 1.0 mg/dL (12/15/17)   ?BUN: 15 mg/dL (12/15/17) ?Calcium Lvl: 8.5 mg/dL (12/15/17) ?Chloride: 106 mmol/L (12/15/17)   ?Chol: 70 mg/dL (12/15/17) ?Chol/HDL: 2.2 ?(12/15/17) ?CK MB: 3.6 ng/mL (17)   ?CO2: 34 mmol/L (12/15/17) ?Creatinine: 1.00 mg/dL (12/15/17) ?eGFR-AA: 98 mL/min (12/15/17)   ?eGFR-SAJI: 81 mL/min (12/15/17) ?Eos Auto: 1 % (12/15/17) ?Globulin: 3.60 gm/mL (12/15/17)   ?Glucose Lvl: 87 mg/dL (12/15/17) ?Hct: 45.2 % (12/15/17) ?HDL: 32 mg/dL (12/15/17)   ?Hgb: 15.1 gm/dL (12/15/17) ?IG#: 0.0100 ?(12/15/17) ?IG%: 0 % (12/15/17)   ?Lymph Auto: 41 % (12/15/17) ?Magnesium Lvl: 2.0 mg/dL (17) ?MCH: 27.9 pg (12/15/17)   ?MCHC: 33.4 gm/dL (12/15/17) ?MCV: 83.5 fL (12/15/17) ?Mono Auto: 10 % (12/15/17)   ?MPV: 10.9 fL (12/15/17) ?Neutro Auto: 46 x10(3)/mcL (12/15/17) ?NT pro BNP.: 791 pg/mL (12/15/17)   ?Platelet: 207 x10(3)/mcL (12/15/17) ?POC Troponin: 0.01 ng/mL (17) ?Potassium Lvl: 3.6 mmol/L (12/15/17)   ?PT: 13.4 second(s) (17) ?PTT: 34.5 second(s) (17) ?RBC: 5.41 x10(6)/mcL (12/15/17)   ?RDW: 13.5 % (12/15/17) ?Sodium Lvl: 142 mmol/L (12/15/17) ?Total CK: 126 unit/L (17)   ?Total Protein: 7.2 gm/dL (12/15/17) ?Tri mg/dL (12/15/17) ?Troponin-I: 0.024 ng/mL (17)   ?TSH: 2.880 mIU/L (17) ?UA Appear: Clear ?(17) ?UA Bact Interp: None Seen ?(17)   ?UA Bili: Negative ?(17) ?UA Blood: Negative ?(17) ?UA Color: YELLOW ?(17)   ?UA Glucose: Normal ?(17) ?UA Hyal Cast Interp: 0-2 ?(17) ?UA Ketones: Trace ?(17)   ?UA Leuk Est: Negative ?(17) ?UA Nitrite: Negative ?(17) ?UA pH: 6.0 ?(17)   ?UA Protein: 30 mg/dL (17) ?UA RBC Interp: 3-5 ?(17) ?UA Spec Grav: 1.007 ?(17)   ?UA Squam Epi Interp:  ?(17) ?UA Urobilinogen: Normal ?(17) ?UA WBC Interp: 3-5 /HPF (17)   ?VLDL: 12 mg/dL (12/15/17)  ?WBC: 4.8 x10(3)/mcL (12/15/17) ?   ?  ?  Accession:?[id number]  Order:?XR Chest 1 View  Report Dt/Tm:?12/14/2017 12:54  Report:?  History:  Cough  ?  Reference:  28 November 2017  ?  Findings:  Portable frontal view of the chest was obtained. Stable left  subclavian pacing device. The heart is not significantly enlarged. The  lungs appear clear. There is no pneumothorax.  ?  Impression:?  No acute findings.  ?  ?  ?  Procedures and Treatment Provided  Medications (4) Active  Scheduled: (3)  aspirin 81 mg Chew tab ?81 mg 1 tab(s), Oral, Daily  fluticasone nasal 0.05 mg/inh Spr ?100 mcg 2 spray(s), Nasal, BID  simvastatin 40 mg Tab UD ?40 mg 1 tab(s), Oral, Once a day (at bedtime)  Continuous: (0)  PRN: (1)  diltiazem 5 mg/ml Inj ?5 mg 1 mL, IV Push, q3hr  ?  Physical Exam  Vitals & Measurements  T:?36.8? ?C ?(Oral)? TMIN:?36.5? ?C ?(Oral)? TMAX:?37.1? ?C ?(Oral)? HR:?62?(Peripheral)? RR:?18? BP:?142/93? SpO2:?98%? WT:?87?kg? WT:?87.0?kg?  General: A&O, NAD  Eye: PERRLA, EOMI, nml conjunctiva  Respiratory: CTA in all lung fields, non-labored, BS equal  Cardiovascular: RRR no M/R/G, distal pulses palpated and symmetric  GI: Soft, NT, active BS  : Soft, No CVA tenderness, No suprapubic tenderness  Skin: Warm, no rash  Neuro: Cranial Nerves II-XII are grossly intact, No focal deficits, Normal motor function  Psychiatric: Cooperative, Appropriate mood & affect  Discharge Plan  1. A-fib with RVR - paced rhythm, CHADSVAC (2) for HTN and PVD  2. HFpEF - EF 55%, diastolic dysfunction, NYHA II-III  3. CAD - Angiogram and pacemaker 11/2017, no significant stenosis  4. PVD - stent in 2003  5. HLD - statin  ?  Home Medications (10) Active  aspirin 81 mg oral tablet, CHEWABLE?81 mg = 1 tab(s), Oral, Daily  cephalexin 500 mg oral capsule?500 mg = 1 cap(s), Oral, q12hr  DilTIAZem Hydrochloride  mg/24 hours oral capsule, extended release?120 mg = 1 cap(s), Oral, Daily  Eliquis 5 mg oral tablet?5 mg = 1 tab(s), Oral,  BID  Eliquis 5 mg oral tablet?5 mg = 1 tab(s), Oral, BID  fluticasone 50 mcg/inh nasal spray?2 spray(s), Nasal, BID  HYDROCO/APAP TAB 7.5-325?  ISOSORBIDE MONONITRATE ER 30 MG TB24?30 mg = 1 tab(s), Oral, Daily  lisinopril 40 mg oral tablet?40 mg = 1 tab(s), Oral, Daily  simvastatin 40 mg oral tablet?40 mg = 1 tab(s), Oral, Once a day (at bedtime)  ?  ?  Patient Discharge Condition  Stable  Discharge Disposition  DC to home  Follow up with PCP in 1-2 weeks  Follow up with cards in 1-2 weeks  ?  DC time of 35 minutes

## 2022-05-03 NOTE — HISTORICAL [facility] CERNER
This is a historical note converted from [employer]. Formatting and pictures may have been removed.  Please reference [employer] for original formatting and attached multimedia. Chief Complaint  mid abd pain onset last night, denies n/v/d. 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 denies urinary symptoms. AAOx.4, resp even unlabored. NAD  History of Present Illness  63yoM with CAD h/o MI, A fib on xarelto, and heart block with pacemaker in place, also with history of carcinoid s/p ex-lap and SBR in November 2018. Presents with one day history of worsening abdominal pain. Just started yesterday AM, was feeling like normal healthy self prior to this. Report pain is constant and epigastric. Does not radiate. Had some nausea in ED but not emesis. BMs have been normal, non-bloody. Denies feves/chills. Denies flushing, tachycarida, or diarrhea. Denies weight loss. Denies any other symptoms currently  Review of Systems  14-pt review of systems is otherwise negative  Physical Exam  Vitals & Measurements  T:?36.3? ?C (Oral)? HR:?67(Peripheral)? RR:?18? BP:?136/112? SpO2:?98%? WT:?97?kg?  NAD, AAOx3  NCAT, EOMI  MMM  neck- supple, NT, no LAD  abd- soft, rotund, mildly TTP, generalized, no rebound/guarding, non-tympanitic  MAEW, 5/5 strength throughout  no c/c/e  no rashes  no focal neuro deficits  mood- appropriate  ?  Labs:  WBC: 7.6  Cr: 1.1  ?  Imaging:  CT abd/pelvis:  IMPRESSION:?  1. Developing small bowel obstruction of the left upper quadrant with  transition point identified as above.  2. Decreasing mesenteric lesion when compared to prior examination.  3. Other secondary findings are above.  ?  Assessment/Plan  63yoM with significant cardiac history and history of carcinoid s/p ex-lap and SBR, now with SBO  ?  - admit to surgery service  - NPO, NGT, IVF  - Zofran, pain control  - consult medicine/cardiology to hold?xarelto for possible surgery this admit  ?  Lissette Best MD  HO-V   Problem List/Past Medical  History  Ongoing  Atrial fibrillation  CAD - Coronary artery disease  Cardiac arrest with ventricular fibrillation  HTN (hypertension)  Hyperlipidemia  Hypertension  Knowledge deficit  MI - Myocardial infarction  Obesity  Historical  No qualifying data  Procedure/Surgical History  Colonoscopy (None) (12/27/2018)  Colonoscopy, flexible; with removal of tumor(s), polyp(s), or other lesion(s) by snare technique (12/27/2018)  Excision of Sigmoid Colon, Via Natural or Artificial Opening Endoscopic, Diagnostic (12/27/2018)  Polypectomy (None) (12/27/2018)  Insertion of Infusion Device into Superior Vena Cava, Percutaneous Approach (11/05/2018)  Diagnostic Laparoscopic (None) (11/02/2018)  Excision of Mesentery, Open Approach (11/02/2018)  Excision of Small Intestine, Open Approach (11/02/2018)  Exploration Laparotomy (None) (11/02/2018)  Inspection of Lower Intestinal Tract, Percutaneous Endoscopic Approach (11/02/2018)  Small Bowel Resection (None) (11/02/2018)  Insertion of Infusion Device into Upper Vein, Percutaneous Approach (11/01/2018)  Revision of Cardiac Lead in Heart, Percutaneous Approach (07/11/2018)  Insertion of Defibrillator Generator into Chest Subcutaneous Tissue and Fascia, Open Approach (06/18/2018)  Removal of Cardiac Rhythm Related Device from Trunk Subcutaneous Tissue and Fascia, Open Approach (06/18/2018)  Insertion of Defibrillator Generator into Chest Subcutaneous Tissue and Fascia, Open Approach (05/28/2018)  Insertion of Pacemaker Lead into Right Ventricle, Percutaneous Approach (05/28/2018)  Removal of Cardiac Lead from Heart, Percutaneous Approach (05/28/2018)  Removal of Cardiac Rhythm Related Device from Trunk Subcutaneous Tissue and Fascia, Open Approach (05/28/2018)  Removal of Stimulator Generator from Trunk Subcutaneous Tissue and Fascia, Open Approach (05/28/2018)  Fluoroscopy of Left Heart using Low Osmolar Contrast (05/25/2018)  Fluoroscopy of Multiple Coronary Arteries using Low  Osmolar Contrast (05/25/2018)  Measurement of Cardiac Sampling and Pressure, Left Heart, Percutaneous Approach (05/25/2018)  Cardiopulmonary resuscitation (eg, in cardiac arrest) (03/08/2018)  Fluoroscopy of Left Heart using Low Osmolar Contrast (03/08/2018)  Fluoroscopy of Multiple Coronary Arteries using Low Osmolar Contrast (03/08/2018)  Measurement of Cardiac Sampling and Pressure, Left Heart, Percutaneous Approach (03/08/2018)  Insertion of Pacemaker Lead into Right Atrium, Percutaneous Approach (11/13/2017)  Insertion of Pacemaker Lead into Right Ventricle, Percutaneous Approach (11/13/2017)  Insertion of Pacemaker, Dual Chamber into Chest Subcutaneous Tissue and Fascia, Open Approach (11/13/2017)  Fluoroscopy of Left Heart using Low Osmolar Contrast (11/10/2017)  Fluoroscopy of Multiple Coronary Arteries using Low Osmolar Contrast (11/10/2017)  Measurement of Cardiac Sampling and Pressure, Left Heart, Percutaneous Approach (11/10/2017)  Performance of Cardiac Pacing, Continuous (11/10/2017)  Angiogram  Pacemaker   Medications  Inpatient  heparin, 7048 units= 1.41 mL, 80 unit/kg, IV Push, Once  IVF Normal Saline NS Bolus 1000ml 1,000 mL, 1000 mL, IV  Home  acetaminophen-codeine 300 mg-30 mg oral tablet., 1 tab(s), Oral, q4hr, PRN  Aldactone 25 mg oral tablet, 25 mg= 1 tab(s), Oral, BID, 3 refills  amlodipine 10 mg oral tablet, 10 mg= 1 tab(s), Oral, Daily, 3 refills  aspirin 81 mg oral tablet, CHEWABLE, 81 mg= 1 tab(s), Oral, Daily, 3 refills  atorvastatin 40 mg oral tablet, 40 mg= 1 tab(s), Oral, Daily, 3 refills  diltiazem 180 mg/24 hours oral TABlet, extended release, 180 mg= 1 tab(s), Oral, Daily, 3 refills  lisinopril 40 mg oral tablet, 40 mg= 1 tab(s), Oral, Daily, 3 refills  methocarbamol 500 mg oral tablet, 1000 mg= 2 tab(s), Oral, TID, PRN  metoprolol tartrate 25 mg oral tab, 25 mg= 1 tab(s), Oral, BID, 3 refills  potassium chloride 20 mEq oral TABLET extended release, 20 mEq= 1 tab(s), Oral, Daily, 9  refills  Promethazine DM 6.25 mg-15 mg/5 mL oral syrup, 5 mL, Oral, q6hr, PRN  Xarelto 20mg Tablet, 20 mg= 1 tab(s), Oral, Daily  Allergies  No Known Allergies  Social History  Alcohol  Never, 10/22/2018  Employment/School  Employed, Activity level: Moderate physical work. Highest education level: High school., 03/24/2015  Home/Environment  Lives with lives with sister. Living situation: Home/Independent. Alcohol abuse in household: No. Substance abuse in household: No. Smoker in household: No. Injuries/Abuse/Neglect in household: No. Feels unsafe at home: No. Family/Friends available for support: Yes. Concern for family members at home: No. Major illness in household: No. Financial concerns: No. TV/Computer concerns: No., 03/24/2015  Nutrition/Health  Type of diet: cardiac. cardiac, Caffeine intake amount: caffeine free mostly. Wants to lose weight: Yes. Sleeping concerns: No. Feels highly stressed: No., 01/16/2018  Sexual  Gender Identity Identifies as male., 02/28/2019  Substance Abuse  Never, 10/22/2018  Tobacco  Former smoker, quit more than 30 days ago, N/A, 05/26/2019  Never (less than 100 in lifetime), N/A, 05/13/2019  Former smoker, quit more than 30 days ago, N/A, 03/19/2019  Family History  Stroke: Mother and Father.      Above Hx and assessment reviewed and discussed with Resident.  Agree with plan of care.

## 2022-05-03 NOTE — HISTORICAL [facility] CERN[facility]
This is a historical note converted from [employer]. Formatting and pictures may have been removed.  Please reference [facility] for original formatting and attached multimedia. Chief Complaint  screening colonoscopy  History of Present Illness  Mr. Daniel is a 63 year old AAM with pT3(4) pN2 MX, at least?stage III, well-differentiated neuroendocrine tumor of small bowel, multifocal, grade 1, CAD, history of cardiac arrest?s/p AICD here for a colonoscopy.? He was found to have a?large mesenteric mass (3.8 cm) causing small bowel obstruction s/p resection of 127 cm of small bowel and mass on 11/02/18.? Pathology showed well differentiated neuroendocrine tumor, multifocal with?21 lymph nodes involved.? He was seen by Dr. Reed?where additional?workup was requested including a colonoscopy.  ?   He denies having a prior colonoscopy.? He denies any family history of colon polyps. ?He had?uncles with colon cancer. ?He reports regular bowel movements without any constipation, diarrhea, rectal bleeding, or melena. ?His weight is stable. ?He denies any abdominal pain.? He is on?Eliquis for history of previous DVTs. ?He stopped this medication 4 days ago.  ?  Previously a heavy drinker and smoker he denies any tobacco, alcohol, recreational drug use.  Review of Systems  Constitutional:??no weight loss,?no fatigue,?no fever,?no chills,?no weakness,?  HEENT:???no pain,?no redness,?no blurry or double vision,??no hoarseness,?no thrush,?no non-healing sores.  Cardiovascular:?no chest pain or discomfort,?no tightness,?no palpitations,?no SOB with activity,??no swelling,?  Respiratory:??no cough,?no sputum,?no coughing up blood,?no SOB,?no wheezing,?no painful breathing.  Gastrointestinal:?no swallowing difficulty,?no heartburn,?no change in appetite,?no nausea,?no change in bowel habits,?no rectal bleeding,?no constipation,?no diarrhea,?no yellow eyes or skin.  Musculoskeletal:?no muscle or joint pain,?no stiffness,?no back pain,?no  redness of joints,?no swelling of joints,?  Skin:?no rashes,?no lumps,?no itching,?no dryness,?color normal for ethnicity,?  Neurologic:?no dizziness,?no fainting,?no seizures,?no weakness,?no numbness,?no tingling,?  Psychiatric:?no nervousness,?no stress,?no depression,?no memory loss.  Hematologic:?no ease of bruising,?no ease of bleeding.  Physical Exam  Vitals & Measurements  T:?36.9? ?C (Oral)? HR:?64(Peripheral)? RR:?20? BP:?121/68? SpO2:?100%? WT:?88.7?kg?  General:?well-developed well-nourished in no acute distress  Eye: PERRLA, EOMI, clear conjunctiva  HENT:? oropharynx without erythema/exudate, oropharynx and nasal mucosal surfaces moist  Neck:? no thyromegaly or lymphadenopathy  Respiratory:?clear to auscultation bilaterally  Cardiovascular:?regular rate and rhythm without murmurs, gallops or rubs  Gastrointestinal: midline incision well healed,?soft, non-tender, non-distended with normal bowel sounds, without masses to palpation  Integumentary: no rashes or skin lesions present  Neurologic: cranial nerves intact, no asterixis, awake, alert, and oriented  Psych: good insight, appropriate mood  ?  Assessment/Plan  Mr. Daniel is a 63 year old AAM with pT3(4) pN2 MX, at least?stage III, well-differentiated neuroendocrine tumor of small bowel, multifocal, grade 1, CAD, history of cardiac arrest?s/p AICD here for a colonoscopy.  ?   Risks, benefits, and alternatives of the procedure discussed.?  Will proceed with endoscopic procedures as scheduled.   Problem List/Past Medical History  Ongoing  Atrial fibrillation  CAD - Coronary artery disease  Cardiac arrest with ventricular fibrillation  HTN (hypertension)  Hyperlipidemia  Hypertension  Knowledge deficit  MI - Myocardial infarction  Obesity  Historical  No qualifying data  Procedure/Surgical History  Colonoscopy (None) (12/27/2018)  Polypectomy (None) (12/27/2018)  Diagnostic Laparoscopic (None) (11/02/2018)  Exploration Laparotomy (None)  (11/02/2018)  Small Bowel Resection (None) (11/02/2018)  Angiogram  Pacemaker   Medications  Inpatient  heparin, 7048 units= 1.41 mL, 80 unit/kg, IV Push, Once  IVF Lactated Ringers LR Infusion 1,000 mL, 1000 mL, IV  Home  Aldactone 25 mg oral tablet, 25 mg= 1 tab(s), Oral, BID, 3 refills  amlodipine 10 mg oral tablet, 10 mg= 1 tab(s), Oral, Daily, 3 refills  aspirin 81 mg oral tablet, CHEWABLE, 81 mg= 1 tab(s), Oral, Daily, 3 refills  atorvastatin 40 mg oral tablet, 40 mg= 1 tab(s), Oral, Daily, 3 refills  DICYCLOMINE HCL 10 MG CAPS, 10 mg= 1 cap(s), Oral, q6hr,? ?Not taking  diltiazem 180 mg/24 hours oral TABlet, extended release, 180 mg= 1 tab(s), Oral, Daily, 3 refills  Eliquis 5 mg oral tablet, 5 mg= 1 tab(s), Oral, BID, 2 refills  ISOSORBIDE MONONITRATE ER 30 MG TB24, 30 mg= 1 tab(s), Oral, Daily,? ?Not taking  lisinopril 40 mg oral tablet, 40 mg= 1 tab(s), Oral, Daily, 3 refills  metoprolol tartrate 25 mg oral tab, 25 mg= 1 tab(s), Oral, BID, 3 refills  ONDANSETRON 8 MG TBDP,? ?Not taking  simethicone 80 mg oral tablet, chewable, 80 mg= 1 tab(s), Chewed, Once,? ?Not taking  Allergies  No Known Allergies  Social History  Alcohol  Never, 10/22/2018  Employment/School  Employed, Activity level: Moderate physical work. Highest education level: High school., 03/24/2015  Home/Environment  Lives with lives with sister. Living situation: Home/Independent. Alcohol abuse in household: No. Substance abuse in household: No. Smoker in household: No. Injuries/Abuse/Neglect in household: No. Feels unsafe at home: No. Family/Friends available for support: Yes. Concern for family members at home: No. Major illness in household: No. Financial concerns: No. TV/Computer concerns: No., 03/24/2015  Nutrition/Health  Type of diet: cardiac. cardiac, Caffeine intake amount: caffeine free mostly. Wants to lose weight: Yes. Sleeping concerns: No. Feels highly stressed: No., 01/16/2018  Substance Abuse  Never,  10/22/2018  Tobacco  Former smoker, No, 12/27/2018  Family History  Stroke: Mother and Father.  Immunizations  Vaccine Date Status Comments   influenza virus vaccine, inactivated - Not Given Patient Refuses     tetanus/diphtheria/pertussis, acel(Tdap) 05/28/2016 Given other (see comment)

## 2022-05-03 NOTE — HISTORICAL [facility]
This is a historical note converted from [employer]. Formatting and pictures may have been removed.  Please reference [employer] for original formatting and attached multimedia. 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   Current Treatment:  Surveillance  ?   Treatment History:  Bowel Resection:  ?   HPI/Clinical History:  For full HPI please refer to MD last note dated 11/07/2019. ?Also refer to assessment and plan section.  ?   Interval History 5/10/2021:  Patient presents today for a scheduled follow-up, alone, currently in surveillance for neuroendocrine tumor of the small bowel. Patient states that he is doing well and does not? have any complaints at this time. CT scans completed on 5/3/2021 showed no new suspicious findings. Discussed follow up appointments with patient. Since CT scans showed no signs of malignancy and patient denies any concerning issues, ?we will have patient follow up in 1 year. Patient amenable to this plan. Instructed to call if any new or concerning symptoms arise. Verbalized understanding. Labwork reviewed, stable. Home medications reviewed with patient. No medications are prescribed by our clinic at this time.  Review of Systems  A complete 12-point ROS was performed in full with pertinent positives as described in interval history. Remainder of ROS done in full and are negative.  ?  Physical Exam  Vitals & Measurements  T:?36.3? ?C (Oral)? HR:?66(Peripheral)? RR:?18? BP:?158/109?  HT:?175.00?cm? WT:?110.400?kg? BMI:?36.05?  Physical Exam:  General: Alert and oriented, No acute distress.?  Appearance: Well-groomed  HEENT: Normocephalic, Oral mucosa is moist. Pupils are equal, round and reactive to light, Extraocular movements are intact, Normal conjunctiva.?  Neck: Supple, Non-tender, No lymphadenopathy, No  thyromegaly.?  Respiratory: Lungs are clear to auscultation, Respirations are non-labored, Breath sounds are equal, Symmetrical chest wall expansion.?  Cardiovascular: Normal rate, Regular rhythm, No edema.?  Breast: Breast exam not performed on todays visit.?  Gastrointestinal: Rounded, Soft, Non-tender, Non-distended, Normal bowel sounds.?  Musculoskeletal: Normal strength. Ambulates without assistance  Integumentary: Warm, dry, intact.?  Neurologic: Alert, Oriented, No focal deficits, Cranial Nerves II-XII are grossly intact.?  Cognition and Speech: Oriented, Speech clear and coherent.?  Psychiatric: Cooperative, Appropriate mood & affect.?  ECOG Performance Scale: 2 - Capable of all self-care but unable to carry out any work activities. Up and about greater than 50 percent of waking hours.?  ?  Assessment/Plan  1.?Neuroendocrine carcinoma of colon?C7A.8  ?  ? pT3,4 pN2 MX, at least stage III, well-differentiated neuroendocrine tumor of small bowel, multifocal, grade 1; large mesenteric mass (3.8 cm); 2:21 lymph nodes involved; small multifocal areas of tumor in the efren-intestinal adipose tissue.  Presented with small bowel obstruction.? Status post resection of 127 cm of small bowel and mesenteric mass in the base of the mesentery, on 11/02/2018.  -Colonoscopy (12/27/2018: 3 mm hyperplastic sigmoid polyp  -No somatostatin receptor avid disease on whole-body gallium dotatate PET/CT (02/15/2019)  -No evidence of neuroendocrine tumor on octreotide scan (06/28/2019)  -No evidence of disease (surveillance CTs C/A/P 11/01/2019)  ?   # History of coronary artery disease, atrial fibrillation,?history of cardiac arrest?with ventricular fibrillation,?MI in 2003, history of previous DVTs, status post CPR?03/08/2018,?on anticoagulation for history of DVTs?and atrial fibrillation, history of second-degree AV block requiring PPM.  ?  ?   ?Plan:  CTs 11/02/2020 show no convincing evidence of recurrent or metastatic disease,  however newly noted mildly enlarged retroperitoneum and mediastinum lymphadenopathy which are nonspecific but stable.  ?   Continue surveillance, however will repeat CT C/A/P for short interval FU in?6 months,?May?2021.  ?   ?Surveillance:  Underwent resection?on 11/02/2018  1.? ?3-12 months post resection?(until?11/2019):  -History and physical  -Consider biochemical markers as clinically indicated  -Abdominal with or without pelvic multiphasic CT or MRI with IV contrast, as clinically indicated  -Chest CT with and without contrast for primary lung/thymus?tumors (as clinically indicated for primary GI tumors)  ?  2.? ?>1-year post resection to 10 years:  Every 12-24 months:  -History and physical  -Consider biochemical markers as clinically indicated  -Consider abdominal with or without pelvic multiphasic CT or MRI with contrast  -Consider chest CT with and without contrast for primary lung/thymus tumors (as clinically indicated for primary GI tumors)  ?  3. ?>10 years:  Consider surveillance as clinically indicated  ?  ?-C/W surveillance, however will repeat CT C/A/P in?12 months  -Surveillance CT C/A/P due:?May 2022  ?RTC 12 months with NP with labs: CBC, CMP CT scans  ?  Discussed POC with patient, all questions answered. Instructed to call clinic for any issues or with any questions PRN, patient verbalizes understanding.   Problem List/Past Medical History  Ongoing  2019-nCoV  Atrial fibrillation  BPH - benign prostatic hyperplasia  CAD - Coronary artery disease  Cardiac arrest with ventricular fibrillation  Diverticulosis of colon  HTN (hypertension)  Hyperlipidemia  Hypertension  Knowledge deficit  MI - Myocardial infarction  Obesity  Steatosis of liver  Historical  Cyst of kidney  Procedure/Surgical History  Colonoscopy (None) (12/27/2018)  Colonoscopy, flexible; with removal of tumor(s), polyp(s), or other lesion(s) by snare technique (12/27/2018)  Excision of Sigmoid Colon, Via Natural or Artificial  Opening Endoscopic, Diagnostic (12/27/2018)  Polypectomy (None) (12/27/2018)  Insertion of Infusion Device into Superior Vena Cava, Percutaneous Approach (11/05/2018)  Diagnostic Laparoscopic (None) (11/02/2018)  Excision of Mesentery, Open Approach (11/02/2018)  Excision of Small Intestine, Open Approach (11/02/2018)  Exploration Laparotomy (None) (11/02/2018)  Inspection of Lower Intestinal Tract, Percutaneous Endoscopic Approach (11/02/2018)  Small Bowel Resection (None) (11/02/2018)  Insertion of Infusion Device into Upper Vein, Percutaneous Approach (11/01/2018)  Revision of Cardiac Lead in Heart, Percutaneous Approach (07/11/2018)  Insertion of Defibrillator Generator into Chest Subcutaneous Tissue and Fascia, Open Approach (06/18/2018)  Removal of Cardiac Rhythm Related Device from Trunk Subcutaneous Tissue and Fascia, Open Approach (06/18/2018)  Insertion of Defibrillator Generator into Chest Subcutaneous Tissue and Fascia, Open Approach (05/28/2018)  Insertion of Pacemaker Lead into Right Ventricle, Percutaneous Approach (05/28/2018)  Removal of Cardiac Lead from Heart, Percutaneous Approach (05/28/2018)  Removal of Cardiac Rhythm Related Device from Trunk Subcutaneous Tissue and Fascia, Open Approach (05/28/2018)  Removal of Stimulator Generator from Trunk Subcutaneous Tissue and Fascia, Open Approach (05/28/2018)  Fluoroscopy of Left Heart using Low Osmolar Contrast (05/25/2018)  Fluoroscopy of Multiple Coronary Arteries using Low Osmolar Contrast (05/25/2018)  Measurement of Cardiac Sampling and Pressure, Left Heart, Percutaneous Approach (05/25/2018)  Cardiopulmonary resuscitation (eg, in cardiac arrest) (03/08/2018)  Fluoroscopy of Left Heart using Low Osmolar Contrast (03/08/2018)  Fluoroscopy of Multiple Coronary Arteries using Low Osmolar Contrast (03/08/2018)  Measurement of Cardiac Sampling and Pressure, Left Heart, Percutaneous Approach (03/08/2018)  Insertion of Pacemaker Lead into Right Atrium,  Percutaneous Approach (11/13/2017)  Insertion of Pacemaker Lead into Right Ventricle, Percutaneous Approach (11/13/2017)  Insertion of Pacemaker, Dual Chamber into Chest Subcutaneous Tissue and Fascia, Open Approach (11/13/2017)  Fluoroscopy of Left Heart using Low Osmolar Contrast (11/10/2017)  Fluoroscopy of Multiple Coronary Arteries using Low Osmolar Contrast (11/10/2017)  Measurement of Cardiac Sampling and Pressure, Left Heart, Percutaneous Approach (11/10/2017)  Performance of Cardiac Pacing, Continuous (11/10/2017)  Angiogram  Pacemaker   Medications  aspirin 81 mg oral tablet, CHEWABLE, 30, Oral, Daily, 6 refills  atorvastatin 40 mg oral tablet, 40 mg= 1 tab(s), Oral, Daily, 6 refills  Cardizem  mg/24 hours oral CAPsule, extended release, 120 mg= 1 cap(s), Oral, Daily, 6 refills  heparin, 7048 units= 1.41 mL, 80 unit/kg, IV Push, Once  losartan 100 mg oral tablet, 100 mg= 1 tab(s), Oral, Daily, 6 refills  metoprolol succinate 50 mg oral tablet extended release, 75 mg= 1.5 tab(s), Oral, Daily, 6 refills  potassium chloride (KCl) ORAL liquid, 20 mEq= 15 mL, Oral, Once  potassium chloride 20 mEq oral TABLET extended release, 20 mEq= 1 tab(s), Oral, BID, 6 refills  spironolactone 50 mg oral tablet, 50 mg= 1 tab(s), Oral, Daily, 6 refills  Xarelto 20mg Tablet, 20 mg= 1 tab(s), Oral, With Supper, 6 refills  Zofran ODT 4 mg oral tablet, disintegrating, 4 mg= 1 tab(s), Oral, q12hr, PRN  Allergies  No Known Allergies  Social History  Abuse/Neglect  No, No, Yes, 05/03/2021  Alcohol  Past, 05/03/2021  Employment/School  Disabled, 10/19/2020  Exercise  Home/Environment  Lives with Siblings. Living situation: Home/Independent. [place], Single family house, 10/19/2020  Nutrition/Health  Regular, Good, 10/19/2020  Sexual  Gender Identity Identifies as male., 02/28/2019  Spiritual/Cultural  pentecstal, 01/12/2021  Substance Use  Past, Marijuana, Drug use interferes with work/home: No. Household substance abuse  concerns: No., 01/12/2021  Tobacco  Former smoker, quit more than 30 days ago, N/A, 05/03/2021  Family History  Hypertension.: Mother and Father.  Intracerebral aneurysm......: Father.  Stroke: Mother and Father.  Immunizations  Vaccine Date Status Comments   pneumococcal 13-valent conjugate vaccine 09/25/2020 Given    influenza virus vaccine, inactivated 02/05/2020 Given    pneumococcal 23-polyvalent vaccine 09/23/2019 Given    influenza virus vaccine, inactivated - Not Given Patient Refuses     tetanus/diphtheria/pertussis, acel(Tdap) 05/28/2016 Given other (see comment)   Health Maintenance  Health Maintenance  ???Pending?(in the next year)  ??? ??OverDue  ??? ? ? ?Influenza Vaccine due??10/01/20??and every 1??day(s)  ??? ? ? ?Alcohol Misuse Screening due??01/02/21??and every 1??year(s)  ??? ? ? ?HF-LVEF due??02/02/21??and every 1??year(s)  ??? ??Due?  ??? ? ? ?Hypertension Management-Education due??05/10/21??and every 1??year(s)  ??? ? ? ?Medicare Annual Wellness Exam due??05/10/21??and every 1??year(s)  ??? ? ? ?Zoster Vaccine due??05/10/21??Unknown Frequency  ??? ??Due In Future?  ??? ? ? ?ADL Screening not due until??09/25/21??and every 1??year(s)  ??? ? ? ?Obesity Screening not due until??01/01/22??and every 1??year(s)  ??? ? ? ?Coronary Artery Disease Maintenance-Electrocardiogram not due until??02/19/22??and every 1??year(s)  ??? ? ? ?HF-Heart Failure Education not due until??05/03/22??and every 1??year(s)  ??? ? ? ?Aspirin Therapy for CVD Prevention not due until??05/03/22??and every 1??year(s)  ???Satisfied?(in the past 1 year)  ??? ??Satisfied?  ??? ? ? ?ADL Screening on??09/25/20.??Satisfied by Romi Spangler RN  ??? ? ? ?Aspirin Therapy for CVD Prevention on??05/03/21.??Satisfied by Melvin Lo MD  ??? ? ? ?Blood Pressure Screening on??05/10/21.??Satisfied by Yadi Gar LPN  ??? ? ? ?Body Mass Index Check on??05/10/21.??Satisfied by Yadi Gar LPN  ??? ? ? ?Coronary Artery  Disease Maintenance-BMP on??05/10/21.??Satisfied by Carmen Rubalcava  ??? ? ? ?Coronary Artery Disease Maintenance-Lipid Lowering Therapy on??05/03/21.??Satisfied by Melvin Lo MD  ??? ? ? ?Coronary Artery Disease Maintenance-Antiplatelet Agent Prescribed on??05/03/21.??Satisfied by Melvin Lo MD  ??? ? ? ?Coronary Artery Disease Maintenance-Electrocardiogram on??02/19/21.  ??? ? ? ?Depression Screening on??05/10/21.??Satisfied by Yadi Gar LPN  ??? ? ? ?Diabetes Maintenance-HgbA1c on??07/21/20.??Satisfied by Eleni Ledezma  ??? ? ? ?Diabetes Screening on??05/10/21.??Satisfied by Carmen Rubalcava  ??? ? ? ?Hypertension Management-Blood Pressure on??05/10/21.??Satisfied by Yadi Gar LPN  ??? ? ? ?Hypertension Management-BMP on??05/10/21.??Satisfied by Carmen Rubalcava  ??? ? ? ?Influenza Vaccine on??03/23/21.??Satisfied by Mary Cox RN  ??? ? ? ?Lipid Screening on??05/03/21.??Satisfied by Sharon Bales  ??? ? ? ?Obesity Screening on??05/10/21.??Satisfied by Yadi Gar LPN  ??? ??Refused?  ??? ? ? ?Zoster Vaccine on??05/03/21.??Recorded by Aníbal SALES, Romi??Reason: Patient Refuses  ?  Lab Results  Test Name Test Result Date/Time   Sodium Lvl 139 mmol/L 05/10/2021 12:15 CDT   Potassium Lvl 4.7 mmol/L 05/10/2021 12:15 CDT   Chloride 110 mmol/L (High) 05/10/2021 12:15 CDT   CO2 22 mmol/L (Low) 05/10/2021 12:15 CDT   Calcium Lvl 9.5 mg/dL 05/10/2021 12:15 CDT   Glucose Lvl 109 mg/dL 05/10/2021 12:15 CDT   BUN 12.0 mg/dL 05/10/2021 12:15 CDT   Creatinine 1.23 mg/dL (High) 05/10/2021 12:15 CDT   eGFR-AA 76 (Low) 05/10/2021 12:15 CDT   eGFR-SAJI 63 mL/min/1.73 m2 (Low) 05/10/2021 12:15 CDT   Bili Total 2.0 mg/dL (High) 05/10/2021 12:15 CDT   Bili Direct 0.5 mg/dL 05/10/2021 12:15 CDT   Bili Indirect 1.50 mg/dL (High) 05/10/2021 12:15 CDT   AST 27 unit/L 05/10/2021 12:15 CDT   ALT 25 unit/L 05/10/2021 12:15 CDT   Alk Phos 72 unit/L 05/10/2021 12:15 CDT   Total  Protein 7.8 gm/dL (High) 05/10/2021 12:15 CDT   Albumin Lvl 4.2 gm/dL 05/10/2021 12:15 CDT   Globulin 3.6 gm/dL (High) 05/10/2021 12:15 CDT   A/G Ratio 1.2 ratio 05/10/2021 12:15 CDT   WBC 7.6 x10(3)/[facility] 05/10/2021 12:15 CDT   RBC 5.13 x10(6)/mcL 05/10/2021 12:15 CDT   Hgb 15.2 gm/dL 05/10/2021 12:15 CDT   Hct 45.2 % 05/10/2021 12:15 CDT   Platelet 234 x10(3)/[facility] 05/10/2021 12:15 CDT   MCV 88.1 fL 05/10/2021 12:15 CDT   MCH 29.6 pg 05/10/2021 12:15 CDT   MCHC 33.6 gm/dL 05/10/2021 12:15 CDT   RDW 13.6 % 05/10/2021 12:15 CDT   MPV 10.5 fL (High) 05/10/2021 12:15 CDT   Abs Neut 3.76 x10(3)/[facility] 05/10/2021 12:15 CDT   Neutro Auto 49 % 05/10/2021 12:15 CDT   Lymph Auto 37 % 05/10/2021 12:15 CDT   Mono Auto 10 % 05/10/2021 12:15 CDT   Eos Auto 2 % 05/10/2021 12:15 CDT   Abs Eos 0.1 x10(3)/[facility] 05/10/2021 12:15 CDT   Basophil Auto 1 % 05/10/2021 12:15 CDT   Abs Neutro 3.76 x10(3)/[facility] 05/10/2021 12:15 CDT   Abs Lymph 2.8 x10(3)/[facility] 05/10/2021 12:15 CDT   Abs Mono 0.8 x10(3)/[facility] 05/10/2021 12:15 CDT   Abs Baso 0.1 x10(3)/[facility] 05/10/2021 12:15 CDT   IG% 1 % 05/10/2021 12:15 CDT   IG# 0.060 05/10/2021 12:15 CDT   Diagnostic Results  (05/03/2021 08:55 CDT CT Abdomen and Pelvis W Contrast)  ru For Exam  FU on retroperitoneum/mediastinum lymphadenopathy;Other (please specify)  ?  Radiology Report  ?  History.  Other malignant neuroendocrine tumor.  ?  Reference.  2 February 2020  ?  Technique.  Helical acquisition from the thoracic inlet through the ischia with IV  contrast. Three plane reconstructions made available for review. DLP  2197 mGycm. Automatic exposure control, adjustment of mA/kV or  iterative reconstruction technique was used to reduce radiation.  ?  Findings.  Chest.  Stable mild cardiomegaly. No pericardial effusion. Stable mild  dilatation of the ascending thoracic aorta up to 4.2 cm.  ?  No mediastinal, hilar or axillary adenopathy by CT size criteria. Some  AP window lymph nodes are slightly smaller, for  example image 21  series 2 measures 6 mm short axis, previously 8 mm by my measurement.  There is gynecomastia.  ?  No pleural effusion. Mild chronic changes of the lungs. Stable small  indistinct subpleural right upper lobe nodule on image 15 series 3. No  new suspicious pulmonary parenchymal findings.  ?  Abdomen and pelvis.  There is hepatic steatosis. Stable tiny focus of subcapsular hepatic  enhancement image 46 series 2. Some mild heterogeneous enhancement  elsewhere is favored related to fatty sparing. No new suspicious liver  lesion is seen. There is cholelithiasis. No significant abnormality of  the spleen, pancreas. Stable mild adrenal thickening. There is no  hydronephrosis. Similar appearance of renal cysts.  ?  No bowel obstruction. Normal appendix. No enlarged mesenteric lymph  nodes.  ?  Improved bladder wall thickening. Prostate moderately enlarged. No  free fluid. The abdominal aorta is normal in caliber. There is mild  atherosclerotic disease.  ?  Mild degenerative change of the spine. There is no suspicious osseous  lesion.  ?  Impression.  1. No new suspicious findings in the chest, abdomen or pelvis.  2. Decreased size of previously discussed mediastinal lymph nodes.?  ?  ?  Signature Line  Electronically Signed By: Yumiko BUNCH, Tani Lorenzo  Date/Time Signed: 05/03/2021 11:26  ?  Technical Comments  CT Abdomen and Pelvis W Contrast:  History of adverse reaction to contrast? No?  History of Renal Insufficiency? Yes?  Dialysis Patient? No?  Did Patient have reaction? No?  Was there an extravasation of contrast? No?  If Yese, was hospital protocol followed? No?  Home Medication Reviewed? Yes?  ?  CT Thorax W Contrast:  History of adverse reaction to contrast? No?  History of Renal Insufficiency? Yes?  Dialysis Patient? No?  Did Patient have reaction? No?  Was there an extravasation of contrast? No?  If Yese, was hospital protocol followed? No?  Home Medication Reviewed? Yes?  ?  ?  This document  has an image  ?  Result type:???????CT Abdomen and Pelvis W Contrast  Result date:???????May 03, 2021 8:55 CDT  Result status:???????Auth (Verified)  Result title:???????CT Abdomen and Pelvis W Contrast  Performed by:???????Tani Calderon MD on May 03, 2021 11:12 CDT  Verified by:???????Tani Calderon MD on May 03, 2021 11:26 CDT  Encounter info:???????[id number], [facility], Outpatient, 5/3/2021 - 5/3/2021  ?   ? [1] (05/03/2021 08:55 CDT CT Thorax W Contrast)  Reason For Exam  FU on retroperitoneum/mediastinum lymphadenopathy  ?  Radiology Report  ?  History.  Other malignant neuroendocrine tumor.  ?  Reference.  2 February 2020  ?  Technique.  Helical acquisition from the thoracic inlet through the ischia with IV  contrast. Three plane reconstructions made available for review. DLP  2197 mGycm. Automatic exposure control, adjustment of mA/kV or  iterative reconstruction technique was used to reduce radiation.  ?  Findings.  Chest.  Stable mild cardiomegaly. No pericardial effusion. Stable mild  dilatation of the ascending thoracic aorta up to 4.2 cm.  ?  No mediastinal, hilar or axillary adenopathy by CT size criteria. Some  AP window lymph nodes are slightly smaller, for example image 21  series 2 measures 6 mm short axis, previously 8 mm by my measurement.  There is gynecomastia.  ?  No pleural effusion. Mild chronic changes of the lungs. Stable small  indistinct subpleural right upper lobe nodule on image 15 series 3. No  new suspicious pulmonary parenchymal findings.  ?  Abdomen and pelvis.  There is hepatic steatosis. Stable tiny focus of subcapsular hepatic  enhancement image 46 series 2. Some mild heterogeneous enhancement  elsewhere is favored related to fatty sparing. No new suspicious liver  lesion is seen. There is cholelithiasis. No significant abnormality of  the spleen, pancreas. Stable mild adrenal thickening. There is no  hydronephrosis. Similar appearance of renal  cysts.  ?  No bowel obstruction. Normal appendix. No enlarged mesenteric lymph  nodes.  ?  Improved bladder wall thickening. Prostate moderately enlarged. No  free fluid. The abdominal aorta is normal in caliber. There is mild  atherosclerotic disease.  ?  Mild degenerative change of the spine. There is no suspicious osseous  lesion.  ?  Impression.  1. No new suspicious findings in the chest, abdomen or pelvis.  2. Decreased size of previously discussed mediastinal lymph nodes.?  ?  ?  Signature Line  Electronically Signed By: Tani Calderon MD  Date/Time Signed: 05/03/2021 11:26  ?  Technical Comments  CT Abdomen and Pelvis W Contrast:  History of adverse reaction to contrast? No?  History of Renal Insufficiency? Yes?  Dialysis Patient? No?  Did Patient have reaction? No?  Was there an extravasation of contrast? No?  If Yese, was hospital protocol followed? No?  Home Medication Reviewed? Yes?  ?  CT Thorax W Contrast:  History of adverse reaction to contrast? No?  History of Renal Insufficiency? Yes?  Dialysis Patient? No?  Did Patient have reaction? No?  Was there an extravasation of contrast? No?  If Yese, was hospital protocol followed? No?  Home Medication Reviewed? Yes?  ?  ?  This document has an image  ?  Result type:???????CT Thorax W Contrast  Result date:???????May 03, 2021 8:55 CDT  Result status:???????Auth (Verified)  Result title:???????CT Thorax W Contrast  Performed by:???????Tani Calderon MD on May 03, 2021 11:12 CDT  Verified by:???????Tani Calderon MD on May 03, 2021 11:26 CDT  Encounter info:???????[id number], [facility], Outpatient, 5/3/2021 - 5/3/2021  ?   ? [2]     [1]?CT Abdomen and Pelvis W Contrast; Tani Calderon MD 05/03/2021 08:55 CDT  [2]?CT Thorax W Contrast; Tani Calderon MD 05/03/2021 08:55 CDT

## 2022-05-03 NOTE — HISTORICAL [facility] CERNER
This is a historical note converted from [facility]. Formatting and pictures may have been removed.  Please reference [facility] for original formatting and attached multimedia. Admit and Discharge Dates  Admit Date: 07/12/2020  Discharge Date: 07/15/2020  Physicians  Attending Physician - Bria BUNCH, Manny Hawthorne  Admitting Physician - Bria BUNCH, Manny Hawthorne  Primary Care Physician - Teresita BUNCH, Melvin  Discharge Diagnosis  1. Gastroenteritis  2. COVID Infection  3. Hypokalemia  4. Hypomagnesemia  5. Hypocalcemia  6. NSTEMI, Type II  7. BRIAN on CKD?stage II  8. Atrial Fibrillation with RVR  9. Hx of paroxysmal A fib on Xarelto  10. 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 Right upper lobe nodule, 4mm - resolved  12. HFpEF?(EF?55% in 02/2020)  13. HTN  14. HLD  Surgical Procedures  No procedures recorded for this visit.  Immunizations  No immunizations recorded for this visit.  Admission Information  Mr. Delio Daniel?is a 63 yo BM with PMH HTN, HLD, CAD with history of an STEMI in 2003, paroxysmal A. fib on Xarelto, HFpEF of 55%, and second-degree AV block?s/p?[facility] PPM 11/13/2017 (up-graded to dual chamber ICD?5/18 2/2 V-fib arrest in 3/2018 in setting of prolonged QT and hypokalemia), stage III well-differentiated neuroendocrine tumor of small bowel s/p resection in Nov 2018 now in remission.  He presented to the ED with complaints of weakness and diarrhea. Symptoms started about 1 week prior to presentation.?No definitive inciting events. Reported watery, non bloody diarrhea, about 10 episodes per day. Associated with loss of appetite, admits that he has not eaten a solid meal for about 3 days. Also endorses generalized weakness. Came to the hospital for evaluation. States he has not really taken his medications lately due to feeling bad, unable to quantify how many doses he has missed.  ?   In the ED, tachycardic and hypertensive.? He did go into A. fib RVR which  required a dose of IV Cardizem.? Labs were second significant for hypokalemia which was replaced.? Troponin slightly elevated as well as BRIAN.? Medicine consulted for further management of NSTEMI and BRIAN. COVID Positive.  Hospital Course  Pt was then admitted to telemetry. Started on Rocephin, azithromycin and flagyl while cultures were ordered and pending. He was started on his home Xarelto. Able to sat in the high 90s on room air, thus he was not treated with steroids or remdesivir. He had electrolyte derangements that were corrected. His potassium was aggressively repleted. Urine studies unremarkable thus far. He was given IV fluids and clinically began to improve. He was then deemed medically stable for discharge home. From a COVID standpoint, he remained on room air and had no respiratory issues.  Significant Findings  Accession:?[id number]  Order:?CT Thorax W/O Contrast  Report Dt/Tm:?07/13/2020 10:42  Report:?  EXAMINATION  CT Thorax W/O Contrast  ?  TECHNIQUE  ?? ? Helical-acquisition CT images were obtained without the  intravenous administration of iodine-based contrast media.  ?? ? Multiplanar reformats were accomplished by a CT technologist at a  separate workstation and pushed to PACS for physician review.  ?  Total DLP (mGy-cm): 558.5  (value may include radiation due to concomitantly performed CT  imaging)  ?? ? Automated tube current modulation and/or weight-based exposure  dosing is utilized, when appropriate, to reach lowest reasonably  achievable exposure rate.  ?  INDICATION  History of neuroendocrine tumor and lung nodule, follow-up; COVID-19+  ?  Comparison: 1 November, 2019  ?  FINDINGS  Images were reviewed in lung, soft tissue, and bone windows.  ?  Exam quality: adequate  ?? ? Overall soft tissue and vascular assessment is limited in the  absence of contrast agent.  ?  Lines/tubes: Right chest wall pacemaker/ICD and associated cardiac  leads are unchanged. There is similar appearance of  right ventricular  lead extending into the epicardial fat.  ?  MEDIASTINUM/MARJORIE  ?? ? Heart: Cardiac chambers are of normal size. There is no  pericardial effusion.  ?? ? Vessels: No focal contour irregularity is appreciated.  Normal-variant arch anatomy is noted, with 2 branch vessel  configuration present.  ?  ?? ? Lymph nodes: No pathologic enlargement or necrotic process.  ?  ?? ? Esophagus: No evidence of acute mural abnormality or luminal  distention.  ?  PULMONARY  ?? ? Central airways: Widely patent.  ?? ? Lungs: Patchy bilateral groundglass airspace attenuation is  noted, in keeping with viral pneumonia given reported COVID-19+  status. No dense consolidation is identified. No enlarging or  otherwise suspicious nodular lesion is evident.  ?  ?? ? Pleural spaces: No thickening, fluid, or air.  ?  MISCELLANEOUS  ?? ? Thyroid: Unremarkable.  ?  ?? ? Abdomen: No evidence of acute process or new focal abnormality.  There is similar appearance of diffuse liver hypoattenuation, in  keeping with fat infiltration.  ?  ?? ? Musculoskeletal: No acute process or destructive focal lesion.  Bilateral gynecomastia is again noted.  ?  IMPRESSION  1. ?Bilateral pulmonary findings compatible with viral pneumonia,  given report of COVID-19 status.  2. ?Otherwise, no evidence of acute or suspicious focal intrathoracic  abnormality.  3. ?Additional chronic secondary details discussed above.  ?  Accession:?[id number]  Order:?XR Abdomen KUB 1 View  Report Dt/Tm:?07/13/2020 10:03  Report:?  XR Abdomen KUB 1 View  ?  REASON FOR STUDY: Diarrhea  ?  COMPARISON: Radiographs from 2/23/2020  ?  FINDINGS: No dilated bowel identified. Small volume stool. No  definitive calcifications over the kidneys.  ?  IMPRESSION: Nonobstructive bowel gas pattern.  ?  Accession:?[id number]  Order:?XR Chest 1 View  Report Dt/Tm:?07/13/2020 09:42  Report:?  EXAMINATION  XR Chest 1 View  ?  INDICATION  Weakness  ?  Comparison:  2/17/2020  ?  FINDINGS  A right chest wall AICD is in place. The lung volumes are low with  increased bilateral interstitial markings. The cardiomediastinal  silhouette is stable in size given differences in technique. There is  no pleural effusion or definite pneumothorax.  ?  IMPRESSION:  Low lung volumes without focal consolidation.  ?  Time Spent on discharge  >30 minutes  Objective  Vitals & Measurements  T:?36.8? ?C (Oral)? TMIN:?36.4? ?C (Oral)? TMAX:?36.8? ?C (Oral)? HR:?66(Peripheral)? RR:?20? BP:?110/77? SpO2:?94%?  Physical Exam  See progress note from today  Patient Discharge Condition  Clinically and hemodynamically stable.  Discharge Disposition  Home  ?  1. Medications  -To continue all prescriptions above as prescribed  -To continue home Rx of Xarelto  -Needs to take daily potassium supplements as above  ?  2. Follow up  -[facility] f/u with PCP Dr. Melvin Lo on 7/27/20  -Pt instructed to quarantine at home x14 days  -Unable to complete VMA and 5-HIAA urine test as inpatient, may need to repeat as outpatient  ?  Plan of care above discussed with pt. He verbalized understanding and is agreeable to plan. ED precautions given   Discharge Medication Reconciliation  Continue  acetaminophen-codeine (acetaminophen-codeine 300 mg-30 mg oral tablet.)?1 tab(s), Oral, q4hr, PRN severe pain  aspirin (aspirin 81 mg oral tablet, CHEWABLE)?30, Oral, Daily  atorvastatin (atorvastatin 40 mg oral tablet)?40 mg, Oral, Daily  diltiazem (Cardizem  mg/24 hours oral TABlet, extended release)?180 mg, Oral, Daily  losartan (losartan 100 mg oral tablet)?100 mg, Oral, Daily  metoprolol (metoprolol tartrate 25 mg oral tab)?25 mg, Oral, q6hr  potassium chloride (potassium chloride 20 mEq oral TABLET extended release)?20 mEq, Oral, BID  rivaroxaban (Xarelto 20mg Tablet)?20 mg, Oral, Daily  ?   Discontinue  losartan (losartan 50 mg oral tablet)?50 mg, Oral, Daily  Education and Orders Provided  Understanding CoronaVirus  Disease 2019 (Custom)  Potassium Content of Foods  Discharge - 07/15/20 12:40:00 CDT, Home, after potassium IV?  Follow up  [facility] follow up telemedicine appointment on Monday, July 27, 2020  Report to Emergency Department if symptoms return or worsen      I was present with the Resident during discharge day management.  ???  [x ] I discussed the case with the Resident and agree with the discharge plan as above.  [ ] I discussed the case with the Resident and agree with the discharge plan as above except:  ???  Time spent on discharge [ 45] minutes

## 2022-05-03 NOTE — HISTORICAL [facility]
This is a historical note converted from [facility]. Formatting and pictures may have been removed.  Please reference [employer] for original formatting and attached multimedia. History of Present Illness  ?  Past medical history: Atrial fibrillation.? Coronary artery disease.? History of cardiac arrest with ventricular fibrillation.? Hypertension.? Dyslipidemia.? MI in 2003.? Obesity.? Status post CPR 03/08/2018. 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 HFpEF (EF >55% on echo 11/2018).? History of second-degree AV block requiring PPM.  ?   Social history:?Single. ?Has 9 children.? Lives in [place].? Used to work as a ?at [employer].? Smoked up to 5 packs of cigarettes daily?for 10-20 years;?quit in 1970s.? Used to drink vodka?every weekend?until he got drunk;?drank for about 35 years, then quit. ?Used to smoke marijuana.  ?   Family history:?Negative for malignancy.  ?   Health maintenance:?He is unsure when he had?last colonoscopy performed, probably 10 years back, at [facility],?apparently unremarkable.  ?   Reason for visit:?  Follow-up for?neuroendocrine carcinoma of small bowel and mesentery  ?   History of present illness:  63-year-old gentleman referred from surgery clinic for evaluation and management of neuroendocrine carcinoma.?  ?  # pT3,4 pN2 MX, at least?stage III, well-differentiated neuroendocrine tumor of small bowel, multifocal, grade 1; large mesenteric mass (3.8 cm); 2:21 lymph nodes involved; small multifocal areas of tumor in the efren-intestinal adipose tissue.  Presented with small bowel obstruction.? Status post resection of 127 cm of small bowel and mesenteric mass in the base of the mesentery, on 11/02/2018.  -Our request for contrast-enhanced chest CT for staging, was denied  -12/26/2018:?CT A/P with contrast: 25 mm area of mesenteric soft tissue may reflect residual mass or postsurgical change; 2 millimetric hepatic lesions are too small to characterize, stable from October 2018,  suggest close attention on follow-up.  -12/27/2018: Colonoscopy:?3 mm?hyperplastic sigmoid polyp; no malignancy  -12/10/2018:?Chromogranin A level normal  -12/12/2018:?24-hour urine 5-HIAA level normal  -02/15/2019: Whole-body?Ga-dotatate PET/CT:?No findings to suggest somatostatin receptor avid disease  -05/26/2019-05/29/2019:?Brief hospitalization?with early small bowel obstruction, managed conservatively  -05/28/2019: Small bowel follow-through: Prompt passage of contrast through the small bowel.? Contrast passed quickly through?small bowel and reached colon within 15 minutes; further contrast progression to rectum within 1 hour.? No discrete small bowel transition point.  -06/28/2019: Octreotide scan: No evidence of neuroendocrine tumor  -07/11/2019: X-ray abdomen flat and erect (abdominal pain): No acute intra-abdominal abnormality  -11/01/2019: Multiphasic contrast-enhanced CTs abdomen and pelvis and chest CT with contrast for surveillance: No evidence of residual, recurrent, or metastatic disease in chest, abdomen, or pelvis  ?  ?   # History of coronary artery disease, atrial fibrillation,?history of cardiac arrest?with ventricular fibrillation,?MI in 2003, history of previous DVTs, status post CPR?03/08/2018,?on anticoagulation for history of DVTs?and atrial fibrillation, history of second-degree AV block requiring PPM.  ?  ?  Interval history:  12/10/2018:  Presents for initial oncology consultation, accompanied by his 3 sisters.? Overall, doing well?except for postoperative abdominal pain, 4 on a scale of 1-10, which keeps improving.? Mild fatigue.? No symptoms of carcinoid syndrome?like flushing, diarrhea,?or bronchoconstriction.? No weakness, fatigue, malaise, fevers, chills, anorexia, unintentional weight loss, nausea, vomiting, hematemesis, melena,?hematochezia, etc.  ?  ?   11/07/2019:  -11/01/2019: Multiphasic contrast-enhanced CTs abdomen and pelvis and chest CT with contrast for surveillance: No  evidence of residual, recurrent, or metastatic disease in chest, abdomen, or pelvis  -11/07/2019: CMP essentially within acceptable limits.? CBC completely normal.  Presents for follow-up visit.? Apart from occasional, intermittent abdominal pains without nausea, vomiting, or GI bleeding,?reports no new symptoms.? Appetite. ?Bowels moving normally. ?No fevers or chills. ?No symptoms of carcinoid syndrome.  ?  ?  Review of systems:  All systems reviewed,?and found to be negative except for symptoms detailed above.? No unusual headaches, loss of consciousness, seizures, strokelike symptoms, chest pains, dyspnea, cough, hemoptysis, etc.  ?  ?  Physical examination:  VITAL SIGNS: ?Reviewed. ? ?  GENERAL:? In no apparent distress.?  HEAD:? No signs of head trauma.  EYES:? Pupils are equal.? Extraocular motions intact.?  EARS:? Hearing grossly intact.  MOUTH:? Oropharynx is normal.  NECK:? No adenopathy, no JVD.??  CHEST:? Chest with clear breath sounds bilaterally.? No wheezes, rales, or rhonchi.?  CARDIAC:? Regular rate and rhythm.? S1 and S2, without murmurs, gallops, or rubs.  VASCULAR:? No Edema.? Peripheral pulses normal and equal in all extremities.  ABDOMEN:? Soft, without detectable tenderness.? No sign of distention.? No?? rebound or guarding, and no masses palpated.?? Bowel Sounds normal.? Midline surgical scar has healed well.  MUSCULOSKELETAL:? Good range of motion of all major joints. Extremities without clubbing, cyanosis or edema.?  NEUROLOGIC EXAM:? Alert and oriented x 3.? No focal sensory or strength deficits.?? Speech normal.? Follows commands.  PSYCHIATRIC:? Mood normal.  SKIN:? No rash or lesions.  ?  ?  Assessment:  To summarize:  pT3,4 pN2 MX, at least stage III, well-differentiated neuroendocrine tumor of small bowel, multifocal, grade 1; large mesenteric mass (3.8 cm); 2:21 lymph nodes involved; small multifocal areas of tumor in the efren-intestinal adipose tissue.  Presented with small bowel  obstruction.? Status post resection of 127 cm of small bowel and mesenteric mass in the base of the mesentery, on 11/02/2018.  -Colonoscopy (12/27/2018: 3 mm hyperplastic sigmoid polyp  -No somatostatin receptor avid disease on whole-body gallium dotatate PET/CT (02/15/2019)  -No evidence of neuroendocrine tumor on octreotide scan (06/28/2019)  -No evidence of disease (surveillance CTs C/A/P 11/01/2019)  ?  # History of coronary artery disease, atrial fibrillation,?history of cardiac arrest?with ventricular fibrillation,?MI in 2003, history of previous DVTs, status post CPR?03/08/2018,?on anticoagulation for history of DVTs?and atrial fibrillation, history of second-degree AV block requiring PPM.  ?  ?  Plan:  No evidence of disease as of 11/01/2019.  ?  Continue surveillance.  ?  Barring any new symptoms of concern,?follow-up in 1 year?with?surveillance?contrast-enhanced multiphasic CTs of abdomen and pelvis?and contrast-enhanced CT scan of chest.  ?  Earlier follow-up?if any new or progressive symptoms of concern in the interim.  ?  Above discussed at length with him. ?All questions answered. ?Went over labs and scans and given copies for his record. ?He understands and agrees with this plan.  ?  ?  Surveillance:  Underwent resection?on 11/02/2018  1.? 3-12 months post resection?(until?11/2019):  -History and physical  -Consider biochemical markers as clinically indicated  -Abdominal with or without pelvic multiphasic CT or MRI with IV contrast, as clinically indicated  -Chest CT with and without contrast for primary lung/thymus?tumors (as clinically indicated for primary GI tumors)  ?  2.? >1-year post resection to 10 years:  Every 12-24 months:  -History and physical  -Consider biochemical markers as clinically indicated  -Consider abdominal with or without pelvic multiphasic CT or MRI with contrast  -Consider chest CT with and without contrast for primary lung/thymus tumors (as clinically indicated for primary  GI tumors)  ?  3. >10 years:  Consider surveillance as clinically indicated  Physical Exam  Vitals & Measurements  T:?36.5? ?C (Oral)? HR:?80(Peripheral)? HR:?80(Monitored)? RR:?18? BP:?111/79? SpO2:?97%?  HT:?175?cm? WT:?108.2?kg? BMI:?35.33?   Problem List/Past Medical History  Ongoing  Atrial fibrillation  CAD - Coronary artery disease  Cardiac arrest with ventricular fibrillation  HTN (hypertension)  Hyperlipidemia  Hypertension  Knowledge deficit  MI - Myocardial infarction  Obesity  Historical  No qualifying data  Procedure/Surgical History  Colonoscopy (None) (12/27/2018)  Colonoscopy, flexible; with removal of tumor(s), polyp(s), or other lesion(s) by snare technique (12/27/2018)  Excision of Sigmoid Colon, Via Natural or Artificial Opening Endoscopic, Diagnostic (12/27/2018)  Polypectomy (None) (12/27/2018)  Insertion of Infusion Device into Superior Vena Cava, Percutaneous Approach (11/05/2018)  Diagnostic Laparoscopic (None) (11/02/2018)  Excision of Mesentery, Open Approach (11/02/2018)  Excision of Small Intestine, Open Approach (11/02/2018)  Exploration Laparotomy (None) (11/02/2018)  Inspection of Lower Intestinal Tract, Percutaneous Endoscopic Approach (11/02/2018)  Small Bowel Resection (None) (11/02/2018)  Insertion of Infusion Device into Upper Vein, Percutaneous Approach (11/01/2018)  Revision of Cardiac Lead in Heart, Percutaneous Approach (07/11/2018)  Insertion of Defibrillator Generator into Chest Subcutaneous Tissue and Fascia, Open Approach (06/18/2018)  Removal of Cardiac Rhythm Related Device from Trunk Subcutaneous Tissue and Fascia, Open Approach (06/18/2018)  Insertion of Defibrillator Generator into Chest Subcutaneous Tissue and Fascia, Open Approach (05/28/2018)  Insertion of Pacemaker Lead into Right Ventricle, Percutaneous Approach (05/28/2018)  Removal of Cardiac Lead from Heart, Percutaneous Approach (05/28/2018)  Removal of Cardiac Rhythm Related Device from Trunk Subcutaneous  Tissue and Fascia, Open Approach (05/28/2018)  Removal of Stimulator Generator from Trunk Subcutaneous Tissue and Fascia, Open Approach (05/28/2018)  Fluoroscopy of Left Heart using Low Osmolar Contrast (05/25/2018)  Fluoroscopy of Multiple Coronary Arteries using Low Osmolar Contrast (05/25/2018)  Measurement of Cardiac Sampling and Pressure, Left Heart, Percutaneous Approach (05/25/2018)  Cardiopulmonary resuscitation (eg, in cardiac arrest) (03/08/2018)  Fluoroscopy of Left Heart using Low Osmolar Contrast (03/08/2018)  Fluoroscopy of Multiple Coronary Arteries using Low Osmolar Contrast (03/08/2018)  Measurement of Cardiac Sampling and Pressure, Left Heart, Percutaneous Approach (03/08/2018)  Insertion of Pacemaker Lead into Right Atrium, Percutaneous Approach (11/13/2017)  Insertion of Pacemaker Lead into Right Ventricle, Percutaneous Approach (11/13/2017)  Insertion of Pacemaker, Dual Chamber into Chest Subcutaneous Tissue and Fascia, Open Approach (11/13/2017)  Fluoroscopy of Left Heart using Low Osmolar Contrast (11/10/2017)  Fluoroscopy of Multiple Coronary Arteries using Low Osmolar Contrast (11/10/2017)  Measurement of Cardiac Sampling and Pressure, Left Heart, Percutaneous Approach (11/10/2017)  Performance of Cardiac Pacing, Continuous (11/10/2017)  Angiogram  Pacemaker   Medications  acetaminophen-codeine 300 mg-30 mg oral tablet., 1 tab(s), Oral, q6hr  Aldactone 25 mg oral tablet, 25 mg= 1 tab(s), Oral, BID, 3 refills  amlodipine 10 mg oral tablet, See Instructions, 6 refills  aspirin 81 mg oral tablet, CHEWABLE, 81 mg= 1 tab(s), Oral, Daily, 3 refills  atorvastatin 40 mg oral tablet, See Instructions, 6 refills  Cardizem  mg/24 hours oral TABlet, extended release, See Instructions, 6 refills  dicyclomine 20 mg oral tablet, 20 mg= 1 tab(s), Oral, TID, PRN  heparin, 7048 units= 1.41 mL, 80 unit/kg, IV Push, Once  Lasix 20 mg oral tablet, 40 mg= 2 tab(s), Oral, Once  lisinopril 40 mg oral tablet,  40 mg= 1 tab(s), Oral, Daily, 3 refills  meloxicam 7.5 mg oral tablet, 7.5 mg= 1 tab(s), Oral, Daily,? ?Not Taking, Completed Rx  methocarbamol 500 mg oral tablet, 1000 mg= 2 tab(s), Oral, TID, PRN  Metoprolol Tartrate 25 mg oral tablet, See Instructions, 6 refills  potassium chloride 20 mEq oral TABLET extended release, 20 mEq= 1 tab(s), Oral, Daily, 9 refills  Vitamin D 50,000 intl units oral capsule, 81764 IntUnit= 1 cap(s), Oral, qWeek  Xarelto 20mg Tablet, 20 mg= 1 tab(s), Oral, Daily, 6 refills  Allergies  No Known Allergies  Social History  Abuse/Neglect  No, 10/28/2019  No, 10/24/2019  No, 10/03/2019  Alcohol  Never, 09/23/2019  Employment/School  Employed, Activity level: Moderate physical work. Highest education level: High school., 03/24/2015  Home/Environment  Lives with lives with sister. Living situation: Home/Independent. Alcohol abuse in household: No. Substance abuse in household: No. Smoker in household: No. Injuries/Abuse/Neglect in household: No. Feels unsafe at home: No. Family/Friends available for support: Yes. Concern for family members at home: No. Major illness in household: No. Financial concerns: No. TV/Computer concerns: No., 03/24/2015  Nutrition/Health  Type of diet: cardiac. cardiac, Caffeine intake amount: caffeine free mostly. Wants to lose weight: Yes. Sleeping concerns: No. Feels highly stressed: No., 01/16/2018  Sexual  Gender Identity Identifies as male., 02/28/2019  Substance Use  Never, 09/23/2019  Tobacco  Never (less than 100 in lifetime), No, 10/28/2019  Former smoker, quit more than 30 days ago, N/A, 10/24/2019  Never (less than 100 in lifetime), N/A, 10/03/2019  Family History  Stroke: Mother and Father.  Immunizations  Vaccine Date Status Comments   pneumococcal 23-polyvalent vaccine 09/23/2019 Given    influenza virus vaccine, inactivated - Not Given Patient Refuses     tetanus/diphtheria/pertussis, acel(Tdap) 05/28/2016 Given other (see comment)

## 2022-05-03 NOTE — HISTORICAL [facility] CERNER
This is a historical note converted from Evelina. Formatting and pictures may have been removed.  Please reference Evelina for original formatting and attached multimedia. History of Present Illness  Past medical history: Atrial fibrillation.? Coronary artery disease.? History of cardiac arrest with ventricular fibrillation.? Hypertension.? Dyslipidemia.? MI in 2003.? Obesity.? Status post CPR 03/08/2018. 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 HFpEF (EF >55% on echo 11/2018).? History of second-degree AV block requiring PPM.  ?  Social history:?Single. ?Has 9 children.? Lives in [place].? Used to work as a ?at [employer].? Smoked up to 5 packs of cigarettes daily?for 10-20 years;?quit in 1970s.? Used to drink vodka?every weekend?until he got drunk;?drank for about 35 years, then quit. ?Used to smoke marijuana.  ?  Family history:?Negative for malignancy.  ?  Health maintenance:?He is unsure when he had?last colonoscopy performed, probably 10 years back, at [facility],?apparently unremarkable.  ?  Reason for visit:?  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  History of present illness:  63-year-old gentleman referred from surgery clinic for evaluation and management of neuroendocrine carcinoma.?  ?  Presented to the emergency department with one-week history of abdominal pain with associated nausea and vomiting.? Found to have small bowel obstruction secondary to mesenteric mass.? No GI bleeding.? Underwent diagnostic laparoscopy and exploratory laparotomy with resection of 127 cm of small bowel and mesenteric mass in the base of the mesentery that was causing obstruction, with the finding of neuroendocrine tumor, on 11/02/2018.?  ?  ?  10/30/2018: CT A/P with contrast (abdominal pain):  Functional small bowel obstruction secondary to either intraluminal soft tissue mass versus desmoplastic reaction to mesenteric lesion which measures 4.6 cm x 4.4 cm; carcinoid is  within the differential; small bowel demonstrates wall thickening with fecalization of several loops of bowel within the right upper quadrant; questionable soft tissue mass with associated vascular lesion measuring 4.6 cm x 4.4 cm; distally, the small bowel is of normal caliber  ?  11/02/2018: Small intestine, segmental resection:  Well-differentiated neuroendocrine tumor; 3.5 cm; multifocal, number of tumors four (additional tumors measure 5 mm, 1.5 cm, and 1.2 cm); grade 1, well-differentiated; mitotic rate <2 mitosis per square meter; Ki-67 labeling index <3%; tumor invades the muscularis propria into subserosal tissue without penetration of overlying serosa; all margins negative; distance of invasive carcinoma from closest margin, 4.5 cm; lymphovascular invasion present; perineural invasion present; large mesenteric masses (>2 cm) present; no tumor deposits; 2: 21 lymph nodes involved; small multifocal areas of tumor are present in the efren-intestinal adipose tissue.  ?  --> pT3 pN2  ?  Additional pathological findings: Mesenteric vascular elastosis; large mesenteric mass (3.8 cm).  ?  Labs reviewed.  11/08/2018: BMP normal.  11/03/2018: LFTs normal.  11/08/2018: Hemoglobin 12.4; rest of CBC unremarkable.  ?  ?  Interval history:  12/10/2018:  Presents for initial oncology consultation, accompanied by his 3 sisters.? Overall, doing well?except for postoperative abdominal pain, 4 on a scale of 1-10, which keeps improving.? Mild fatigue.? No symptoms of carcinoid syndrome?like flushing, diarrhea,?or bronchoconstriction.? No weakness, fatigue, malaise, fevers, chills, anorexia, unintentional weight loss, nausea, vomiting, hematemesis, melena,?hematochezia, etc.  ?  ?  Review of systems:  All systems reviewed,?and found to be negative except for symptoms detailed above.? No unusual headaches, loss of consciousness, seizures, strokelike symptoms, chest pains, dyspnea, cough, hemoptysis, etc.  ?  ?  Physical  examination:  VITAL SIGNS: ?Reviewed. ? ?  GENERAL:? In no apparent distress.?  HEAD:? No signs of head trauma.  EYES:? Pupils are equal.? Extraocular motions intact.?  EARS:? Hearing grossly intact.  MOUTH:? Oropharynx is normal.  NECK:? No adenopathy, no JVD.??  CHEST:? Chest with clear breath sounds bilaterally.? No wheezes, rales, or rhonchi.?  CARDIAC:? Regular rate and rhythm.? S1 and S2, without murmurs, gallops, or rubs.  VASCULAR:? No Edema.? Peripheral pulses normal and equal in all extremities.  ABDOMEN:? Soft, without detectable tenderness.? No sign of distention.? No?? rebound or guarding, and no masses palpated.?? Bowel Sounds normal.? Midline surgical scar has healed well.  MUSCULOSKELETAL:? Good range of motion of all major joints. Extremities without clubbing, cyanosis or edema.?  NEUROLOGIC EXAM:? Alert and oriented x 3.? No focal sensory or strength deficits.?? Speech normal.? Follows commands.  PSYCHIATRIC:? Mood normal.  SKIN:? No rash or lesions.  ?  ?  Assessment:  pT3(4) pN2 MX, at least?stage III, well-differentiated neuroendocrine tumor of small bowel, multifocal, grade 1; large mesenteric mass (3.8 cm); 2: 21 lymph nodes involved; small multifocal areas of tumor in the efren-intestinal adipose tissue.  Presented with small bowel obstruction.? Status post resection of 127 cm of small bowel and mesenteric mass in the base of the mesentery, on 11/02/2018.  ?  ?  Plan:  ?  -Abdominal/pelvic multiphasic CT or MRI scan is recommended.? Patient has already had contrast-enhanced CT scans of abdomen and pelvis?done, therefore,?no need.  ?  -Somatostatin receptor based imaging (i.e., Ga-dotatate PET/CT or somatostatin receptor scintigraphy).? Out of these, Ga-dotatate PET/CT is more sensitive than somatostatin receptor scintigraphy for determining somatostatin receptor status, and is preferred.??Will try to schedule in [place].??  ?  -Colonoscopy.? Will refer to GI.  ?  -Small bowel imaging (CT  enterography or capsule endoscopy).? The patient has recently undergone a partial small bowel resection for neuroendocrine tumor; therefore,?probably not needed at this time.  ?  -Chest CT with and without contrast.? Will order.  ?  -Biochemical evaluation as clinically indicated.? Primary tumors in the GI tract is usually are not associated with symptoms of hormone secretion unless extensive metastasis (flushing, diarrhea, cardiac valvular fibrosis, bronchoconstriction).  -Chromogranin A (category 3). ?Will order.  -24-hour urine for plasma 5-HIAA. ?Will order 24-hour?urine for 5-HIAA  ?  -If local regional disease (i.e., no metastasis), then surveillance  ?  Follow-up visit in 3 weeks, to go over test results.  ?  Discussed above in great detail with the patient and his family. ?All questions answered.? Went over pathology report and scans and gave him copies for his record. ?They were profusely appreciative.? He understands and agrees with this plan.  Physical Exam  Vitals & Measurements  T:?36.6? ?C (Oral)? HR:?77(Peripheral)? RR:?18? BP:?115/79? SpO2:?99%?  HT:?175?cm? HT:?175?cm? WT:?89.4?kg? WT:?89.4?kg? BMI:?29.19?   Problem List/Past Medical History  Ongoing  Atrial fibrillation  CAD - Coronary artery disease  Cardiac arrest with ventricular fibrillation  HTN (hypertension)  Hyperlipidemia  Hypertension  Knowledge deficit  MI - Myocardial infarction  Obesity  Historical  No qualifying data  Procedure/Surgical History  Diagnostic Laparoscopic (None) (11/02/2018)  Exploration Laparotomy (None) (11/02/2018)  Small Bowel Resection (None) (11/02/2018)  Angiogram  Pacemaker   Medications  Aldactone 25 mg oral tablet, 25 mg= 1 tab(s), Oral, BID, 3 refills  amlodipine 10 mg oral tablet, 10 mg= 1 tab(s), Oral, Daily, 3 refills  aspirin 81 mg oral tablet, CHEWABLE, 81 mg= 1 tab(s), Oral, Daily, 3 refills  atorvastatin 40 mg oral tablet, 40 mg= 1 tab(s), Oral, Daily, 3 refills  DICYCLOMINE HCL 10 MG CAPS, 10 mg= 1  cap(s), Oral, q6hr  diltiazem 180 mg/24 hours oral TABlet, extended release, 180 mg= 1 tab(s), Oral, Daily, 3 refills  Eliquis 5 mg oral tablet, 5 mg= 1 tab(s), Oral, BID, 2 refills  heparin, 7048 units= 1.41 mL, 80 unit/kg, IV Push, Once  ISOSORBIDE MONONITRATE ER 30 MG TB24, 30 mg= 1 tab(s), Oral, Daily  lisinopril 40 mg oral tablet, 40 mg= 1 tab(s), Oral, Daily, 3 refills  metoprolol tartrate 25 mg oral tab, 25 mg= 1 tab(s), Oral, BID, 3 refills  ONDANSETRON 8 MG TBDP  simethicone 80 mg oral tablet, chewable, 80 mg= 1 tab(s), Chewed, Once,? ?Not taking  Allergies  No Known Allergies  Social History  Alcohol  Never, 11/29/2018  Never, 10/22/2018  Employment/School  Employed, Activity level: Moderate physical work. Highest education level: High school., 03/24/2015  Home/Environment  Lives with lives with sister. Living situation: Home/Independent. Alcohol abuse in household: No. Substance abuse in household: No. Smoker in household: No. Injuries/Abuse/Neglect in household: No. Feels unsafe at home: No. Family/Friends available for support: Yes. Concern for family members at home: No. Major illness in household: No. Financial concerns: No. TV/Computer concerns: No., 03/24/2015  Nutrition/Health  Type of diet: cardiac. cardiac, Caffeine intake amount: caffeine free mostly. Wants to lose weight: Yes. Sleeping concerns: No. Feels highly stressed: No., 01/16/2018  Substance Abuse  Never, 10/22/2018  Tobacco  Former smoker, Cigarettes, No, 4 per day., 12/04/2018  Former smoker, Cigarettes, No, 11/29/2018  Family History  Stroke: Mother and Father.  Immunizations  Vaccine Date Status Comments   influenza virus vaccine, inactivated - Not Given Patient Refuses     tetanus/diphtheria/pertussis, acel(Tdap) 05/28/2016 Given other (see comment)   Health Maintenance  Health Maintenance  ???Pending?(in the next year)  ??? ??OverDue  ??? ? ? ?Coronary Artery Disease Maintenance-Lipid Lowering Therapy due??and every?  ???  ??Due?  ??? ? ? ?Colorectal Screening due??11/07/18??and every 1??year(s)  ??? ? ? ?Alcohol Misuse Screening due??12/09/18??and every 1??year(s)  ??? ? ? ?Hypertension Management-Education due??12/09/18??and every 1??year(s)  ??? ? ? ?Influenza Vaccine due??12/09/18??and every?  ??? ? ? ?Zoster Vaccine due??12/09/18??and every 100??year(s)  ??? ??Due In Future?  ??? ? ? ?Aspirin Therapy for CVD Prevention not due until??07/23/19??and every 1??year(s)  ??? ? ? ?Smoking Cessation not due until??07/30/19??and every 1??year(s)  ??? ? ? ?ADL Screening not due until??11/06/19??and every 1??year(s)  ??? ? ? ?Coronary Artery Disease Maintenance-Electrocardiogram not due until??11/06/19??and every 1??year(s)  ??? ? ? ?Hypertension Management-BMP not due until??11/08/19??and every 1??year(s)  ??? ? ? ?Coronary Artery Disease Maintenance-BMP not due until??11/08/19??and every 1??year(s)  ??? ? ? ?HF-Heart Failure Education not due until??11/11/19??and every 1??year(s)  ??? ? ? ?Blood Pressure Screening not due until??12/04/19??and every 1??year(s)  ??? ? ? ?Body Mass Index Check not due until??12/04/19??and every 1??year(s)  ??? ? ? ?Depression Screening not due until??12/04/19??and every 1??year(s)  ??? ? ? ?Hypertension Management-Blood Pressure not due until??12/04/19??and every 1??year(s)  ??? ? ? ?Obesity Screening not due until??12/04/19??and every 1??year(s)  ???Satisfied?(in the past 1 year)  ??? ??Satisfied?  ??? ? ? ?ADL Screening on??11/06/18.??Satisfied by Harmeet SALES, Ladonna Brown  ??? ? ? ?Aspirin Therapy for CVD Prevention on??07/23/18.??Satisfied by Juancarlos Mota MD  ??? ? ? ?Blood Pressure Screening on??12/04/18.??Satisfied by Felipa Pickard LPN K  ??? ? ? ?Body Mass Index Check on??12/04/18.??Satisfied by Neerajtami FAN Felipa K  ??? ? ? ?Coronary Artery Disease Maintenance-BMP on??11/08/18.??Satisfied by Cassia Madera  ??? ? ? ?Depression Screening on??12/04/18.??Satisfied by Neeraj FAN Felipa K  ??? ? ? ?Diabetes  Screening on??11/08/18.??Satisfied by Cassia Madera  ??? ? ? ?Hypertension Management-Blood Pressure on??12/04/18.??Satisfied by Felipa Pickard LPN  ??? ? ? ?Hypertension Management-BMP on??11/08/18.??Satisfied by Cassia Madera  ??? ? ? ?Influenza Vaccine on??12/04/18.??Satisfied by Felipa Pickard LPN  ??? ? ? ?Lipid Screening on??03/10/18.??Satisfied by Liz Dai  ??? ? ? ?Obesity Screening on??12/04/18.??Satisfied by Felipa Pickard LPN  ??? ? ? ?Smoking Cessation on??07/30/18.??Satisfied by Patricia Sellers RN  ??? ? ? ?Smoking Cessation (Coronary Artery Disease) on??07/30/18.??Satisfied by Patricia Sellers RN  ?  ?

## 2022-05-03 NOTE — HISTORICAL [facility] CERNER
This is a historical note converted from [employer]. Formatting and pictures may have been removed.  Please reference [employer] for original formatting and attached multimedia. Chief Complaint  pt arrived via EMS weakness and generalized body aches for the last several days. EMS reports a-fib of 150-170 enroute. Hr 113 on arrival.  History of Present Illness  The patient is a 63 yo BM with PMH HTN, HLD, CAD with history of an STEMI in 2003, paroxysmal A. fib on Xarelto, HFpEF of 55%, and second-degree AV block?s/p?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  He presents to the ED with complaints of weakness and diarrhea. He is an extremely poor historian but?from what?I can gather,?symptoms started about 1 week ago.?No definitive inciting events. Reports watery, non bloody diarrhea, about 10 episodes per day. Associated with loss of appetite, admits that he has not eaten a solid meal for about 3 days. Also endorses generalized weakness. Came to the hospital for evaluation. States he has not really taken his medications lately due to feeling bad, unable to quantify how many doses he has missed.  ?   In the ED, tachycardic and hypertensive.? He did go into A. fib RVR which required a dose of IV Cardizem.? Labs were second significant for hypokalemia which was replaced.? Troponin slightly elevated as well as BRIAN.? Medicine consulted for further management of NSTEMI and BRIAN. COVID Positive.  ?  Social hx:  Lives at home with brother and sister  Previous smoker, no alcohol use no illicit drug use?currently  ?  Review of Systems  Gen: AOx3,?No fever, No weight changes, +Decreased appetite  Eye: No blindness  Heart:?No chest pains, No palpitations,?No diaphoresis  Lungs:?No SOB, No wheezing  GI: No abd pain,?No Nausea, No vomiting,  +diarrhea  : No hematuria, No dysuria  Musk: No LE swelling  Integumentary: No rash, No pruritus  Neuro: Normal speech, No focal weakness, No headache  Physical Exam  Vitals & Measurements  T:?37.1? ?C (Oral)? TMIN:?36.9? ?C (Oral)? TMAX:?37.1? ?C (Oral)? HR:?90(Peripheral)? HR:?90(Monitored)? RR:?20? BP:?132/95? SpO2:?96%? WT:?89?kg? BMI:?31.91?  Gen: No acute distress, afebrile  HEENT: Normocephalic, No scleral icterus, Oral mucosa moist  Chest: CTAB  Heart: S1, S2, no appreciable murmur,?regular rate and rhythm  Abd: BS+, soft, non tender  Extremity: warm, no LE edema  Neurologic: alert and oriented x 3, moving all extremity with good strength  MSK: normal musculature, no clubbing or cyanosis  Assessment/Plan  Gastroenteritis  -Likely viral, could possibly be?COVID related.?  -Will continue abx as below  -C. diff negative  -Stool cx ordered  -Bolused 2L in ED, will continue gentle fluid resuscitation overnight as he is still having episodes of diarrhea  ?  COVID Infection  Date of symptom onset: Presumably about 1 week ago  Date Positive: 7/12/20  Hospital Day: 1  Oxygen Requirement: RA  LDH, D-Dimer, Ferritin, ESR, CRP Trend: Baseline ordered  Renal Function: BRIAN on CKD stage II.? Baseline creatinine is 1.1, admission creatinine 2.6.  Antibiotics Name/Dose/Day:?Rocephin 1 g daily, azithromycin 5 mg daily?(day 1)  Steroids Dose/Day:?None  Remdesivir Y/N/Day: No, not a candidate at this time  Anticoagulation: Heparin gtt  Nutrition: Cardiac diet  Lines: PIV  Complicating factors: BRIAN  ?   Hypokalemia  Hypocalcemia  -Replaced as necessary  -Mg and phos checked?also  ?   NSTEMI  -Most likely type II demand ischemia  -Trend troponins  -Loaded with aspirin in the ED  -Placed on heparin gtt  ?   BRIAN on CKD?stage II  -Likely prerenal, volume depleted  -Continue gentle IV fluids for now  -If BMP improved in AM, will dc maintenance fluids to prevent overload in the setting of COVID-19  -Avoiding nephrotoxic  meds  ?  Atrial Fibrillation with RVR, RVR resolved  Hx of paroxysmal A fib on Xarelto  -Placed on heparin gtt as inpatient for now, if troponins trend down?then will transition back to xarelto  -Resume home metoprolol and cardizem  ?   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  -Followed by [facility] but is currently in remission  -Lung nodule was incidental finding, had planned for opat CT to monitor status  ?   HFpEF?(EF?55% in 02/2020)  -Continue GDMT as tolerated  ?   HTN  -Resume home meds  ?   HLD  -Continue atorvastatin 40 mg  ?  Disposition: Admit to COVID unit. From a respiratory standpoint the pt is doing well on RA. Blood and stool cx pending. Fluid resuscitate for BRIAN, will dc maintenance if BMP continues to improve.  ?   Problem List/Past Medical History  Ongoing  2019-nCoV  Atrial fibrillation  BPH - benign prostatic hyperplasia  CAD - Coronary artery disease  Cardiac arrest with ventricular fibrillation  Diverticulosis of colon  HTN (hypertension)  Hyperlipidemia  Hypertension  Knowledge deficit  MI - Myocardial infarction  Obesity  Steatosis of liver  Historical  Cyst of kidney  Procedure/Surgical History  Colonoscopy (None) (12/27/2018)  Colonoscopy, flexible; with removal of tumor(s), polyp(s), or other lesion(s) by snare technique (12/27/2018)  Excision of Sigmoid Colon, Via Natural or Artificial Opening Endoscopic, Diagnostic (12/27/2018)  Polypectomy (None) (12/27/2018)  Insertion of Infusion Device into Superior Vena Cava, Percutaneous Approach (11/05/2018)  Diagnostic Laparoscopic (None) (11/02/2018)  Excision of Mesentery, Open Approach (11/02/2018)  Excision of Small Intestine, Open Approach (11/02/2018)  Exploration Laparotomy (None) (11/02/2018)  Inspection of Lower Intestinal Tract, Percutaneous Endoscopic Approach (11/02/2018)  Small  Bowel Resection (None) (11/02/2018)  Insertion of Infusion Device into Upper Vein, Percutaneous Approach (11/01/2018)  Revision of Cardiac Lead in Heart, Percutaneous Approach (07/11/2018)  Insertion of Defibrillator Generator into Chest Subcutaneous Tissue and Fascia, Open Approach (06/18/2018)  Removal of Cardiac Rhythm Related Device from Trunk Subcutaneous Tissue and Fascia, Open Approach (06/18/2018)  Insertion of Defibrillator Generator into Chest Subcutaneous Tissue and Fascia, Open Approach (05/28/2018)  Insertion of Pacemaker Lead into Right Ventricle, Percutaneous Approach (05/28/2018)  Removal of Cardiac Lead from Heart, Percutaneous Approach (05/28/2018)  Removal of Cardiac Rhythm Related Device from Trunk Subcutaneous Tissue and Fascia, Open Approach (05/28/2018)  Removal of Stimulator Generator from Trunk Subcutaneous Tissue and Fascia, Open Approach (05/28/2018)  Fluoroscopy of Left Heart using Low Osmolar Contrast (05/25/2018)  Fluoroscopy of Multiple Coronary Arteries using Low Osmolar Contrast (05/25/2018)  Measurement of Cardiac Sampling and Pressure, Left Heart, Percutaneous Approach (05/25/2018)  Cardiopulmonary resuscitation (eg, in cardiac arrest) (03/08/2018)  Fluoroscopy of Left Heart using Low Osmolar Contrast (03/08/2018)  Fluoroscopy of Multiple Coronary Arteries using Low Osmolar Contrast (03/08/2018)  Measurement of Cardiac Sampling and Pressure, Left Heart, Percutaneous Approach (03/08/2018)  Insertion of Pacemaker Lead into Right Atrium, Percutaneous Approach (11/13/2017)  Insertion of Pacemaker Lead into Right Ventricle, Percutaneous Approach (11/13/2017)  Insertion of Pacemaker, Dual Chamber into Chest Subcutaneous Tissue and Fascia, Open Approach (11/13/2017)  Fluoroscopy of Left Heart using Low Osmolar Contrast (11/10/2017)  Fluoroscopy of Multiple Coronary Arteries using Low Osmolar Contrast (11/10/2017)  Measurement of Cardiac Sampling and Pressure, Left Heart, Percutaneous  Approach (11/10/2017)  Performance of Cardiac Pacing, Continuous (11/10/2017)  Angiogram  Pacemaker   Medications  Inpatient  aspirin 81 mg oral tablet, CHEWABLE, 30, Oral, Daily  atorvastatin 20 mg oral tablet, 40 mg= 1 tab(s), Oral, Daily  azithromycin 500 mg intravenous injection  ceftriaxone (for IVPB)  dilTIAZem 120 mg/24 hours oral tablet, extended release, 180 mg= 1 cap(s), Oral, Daily  heparin, 7048 units= 1.41 mL, 80 unit/kg, IV Push, Once  heparin, 7120 units= 1.42 mL, 80 unit/kg, IV Push, q6hr, PRN  heparin, 3560 units= 0.71 mL, 40 unit/kg, IV Push, q6hr, PRN  Heparin 25,000 units/ NACL 250 mL 25,000 units, 38124 units= 250 mL, IV  Lactated Ringers Infusion 1,000 mL, 1000 mL, IV  metoprolol tartrate 25 mg oral tab, 25 mg= 1 tab(s), Oral, q6hr  Home  acetaminophen-codeine 300 mg-30 mg oral tablet., 1 tab(s), Oral, q4hr, PRN  aspirin 81 mg oral tablet, CHEWABLE, 30, Oral, Daily, 3 refills  atorvastatin 40 mg oral tablet, 40 mg= 1 tab(s), Oral, Daily, 3 refills  Cardizem  mg/24 hours oral TABlet, extended release, 180 mg= 1 tab(s), Oral, Daily, 3 refills  losartan 100 mg oral tablet, 100 mg= 1 tab(s), Oral, Daily, 3 refills  losartan 50 mg oral tablet, 50 mg= 1 tab(s), Oral, Daily  metoprolol tartrate 25 mg oral tab, 25 mg= 1 tab(s), Oral, q6hr, 3 refills  potassium chloride 20 mEq oral TABLET extended release, 20 mEq= 1 tab(s), Oral, BID, 3 refills  Xarelto 20mg Tablet, 20 mg= 1 tab(s), Oral, Daily, 3 refills  Allergies  No Known Allergies  Social History  Abuse/Neglect  No, 07/13/2020  No, 07/12/2020  No, 07/12/2020  No, No, Yes, 05/12/2020  No, 03/09/2020  No, No, Yes, 02/17/2020  Alcohol  Never, 09/23/2019  Employment/School  Employed, Activity level: Moderate physical work. Highest education level: High school., 03/24/2015  Home/Environment  Lives with lives with sister. Living situation: Home/Independent. Alcohol abuse in household: No. Substance abuse in household: No. Smoker in household: No.  Injuries/Abuse/Neglect in household: No. Feels unsafe at home: No. Family/Friends available for support: Yes. Concern for family members at home: No. Major illness in household: No. Financial concerns: No. TV/Computer concerns: No., 03/24/2015  Nutrition/Health  Type of diet: cardiac. cardiac, Caffeine intake amount: caffeine free mostly. Wants to lose weight: Yes. Sleeping concerns: No. Feels highly stressed: No., 01/16/2018  Sexual  Gender Identity Identifies as male., 02/28/2019  Substance Use  Never, 09/23/2019  Tobacco  Former smoker, quit more than 30 days ago, N/A, 07/13/2020  Former smoker, quit more than 30 days ago, N/A, 07/12/2020  Never (less than 100 in lifetime), No, 03/09/2020  Former smoker, quit more than 30 days ago, N/A, 02/23/2020  Former smoker, quit more than 30 days ago, No, 02/17/2020  Former smoker, quit more than 30 days ago, No, 02/02/2020  Former smoker, quit more than 30 days ago, N/A, 02/02/2020  Family History  Hypertension.: Mother and Father.  Intracerebral aneurysm 09-AUG-2016 15:25:48<$>: Father.  Stroke: Mother and Father.  Immunizations  Vaccine Date Status Comments   influenza virus vaccine, inactivated 02/05/2020 Given    pneumococcal 23-polyvalent vaccine 09/23/2019 Given    influenza virus vaccine, inactivated - Not Given Patient Refuses     tetanus/diphtheria/pertussis, acel(Tdap) 05/28/2016 Given other (see comment)   Lab Results  Labs Last 24 Hours?  ?Chemistry? Hematology/Coagulation?   Sodium Lvl: 141 mmol/L (07/12/20 22:50:00) PT: 13.2 second(s) (07/12/20 17:25:00)   Potassium Lvl:?3.4 mmol/L?Low (07/12/20 22:50:00) INR: 1.04 (07/12/20 17:25:00)   Chloride:?111 mmol/L?High (07/12/20 22:50:00) PTT: 31.4 second(s) (07/12/20 17:25:00)   CO2:?18 mmol/L?Low (07/12/20 22:50:00) D-Dimer:?0.51 mcg/ml FEU?High (07/12/20 22:50:00)   Calcium Lvl:?7.1 mg/dL?Low (07/12/20 22:50:00) WBC: 7.5 x10(3)/mcL (07/12/20 17:25:00)   Magnesium Lvl: 1.9 mg/dL (07/12/20 17:25:00) RBC:?6.08  x10(6)/mcL?High (07/12/20 17:25:00)   Glucose Lvl: 87 mg/dL (07/12/20 22:50:00) Hgb: 17.3 gm/dL (07/12/20 17:25:00)   BUN:?24 mg/dL?High (07/12/20 22:50:00) Hct:?51.1 %?High (07/12/20 17:25:00)   Creatinine:?1.6 mg/dL?High (07/12/20 22:50:00) Platelet: 169 x10(3)/mcL (07/12/20 17:25:00)   BUN/Creat Ratio: 15 (07/12/20 22:50:00) MCV: 84 fL (07/12/20 17:25:00)   eGFR-AA:?56 mL/min?Low (07/12/20 22:50:00) MCH: 28.5 pg (07/12/20 17:25:00)   eGFR-SAJI:?47 mL/min?Low (07/12/20 22:50:00) MCHC: 33.9 gm/dL (07/12/20 17:25:00)   Bili Total:?1.7 mg/dL?High (07/12/20 17:25:00) RDW:?15 %?High (07/12/20 17:25:00)   Bili Direct:?0.5 mg/dL?High (07/12/20 17:25:00) MPV:?10.7 fL?High (07/12/20 17:25:00)   Bili Indirect: 1.2 mg/dL (07/12/20 17:25:00) Abs Neut: 4.4 x10(3)/mcL (07/12/20 17:25:00)   AST:?68 unit/L?High (07/12/20 17:25:00) Neutro Auto: 59 % (07/12/20 17:25:00)   ALT: 45 unit/L (07/12/20 17:25:00) Lymph Auto: 28 % (07/12/20 17:25:00)   Alk Phos: 64 unit/L (07/12/20 17:25:00) Mono Auto: 10 % (07/12/20 17:25:00)   Total Protein: 8.2 gm/dL (07/12/20 17:25:00) Eos Auto: 2 % (07/12/20 17:25:00)   Albumin Lvl: 3.5 gm/dL (07/12/20 17:25:00) Abs Eos: 0.2 x10(3)/mcL (07/12/20 17:25:00)   Globulin:?4.7 gm/mL?High (07/12/20 17:25:00) Basophil Auto: 0 % (07/12/20 17:25:00)   A/G Ratio:?0.7 ratio?Low (07/12/20 17:25:00) Abs Neutro: 4.4 x10(3)/mcL (07/12/20 17:25:00)   Phosphorus: 2.9 mg/dL (07/12/20 17:25:00) Abs Lymph: 2.1 x10(3)/mcL (07/12/20 17:25:00)   LDH:?257 unit/L?High (07/12/20 22:50:00) Abs Mono: 0.7 x10(3)/mcL (07/12/20 17:25:00)   Ferritin Lvl: 271.9 ng/mL (07/12/20 22:50:00) Abs Baso: 0 x10(3)/mcL (07/12/20 17:25:00)   CRP:?2.7 mg/dL?High (07/12/20 22:50:00) IG%: 0 % (07/12/20 17:25:00)   Total CK:?1836 unit/L?High (07/12/20 17:25:00) IG#: 0.03 (07/12/20 17:25:00)   CK MB: 2.2 ng/mL (07/12/20 17:25:00) Sed Rate: 8 mm/hr (07/12/20 22:50:00)   Troponin-I:?0.185 ng/mL?High (07/12/20 22:50:00)    Diagnostic Results  Chest x-ray  per my read shows patent airway that is midline.? No bony abnormalities appreciated.? Difficult to ascertain the exact cardiac silhouette due to haziness in bilateral fields.? He does have a pacemaker noted on the right side of his chest.? No pleural effusions noted on the right side, however unable to tell on the left.  ?  KUB report pending but no significant findings on my read      I have performed a history and physical examination of the patient and discussed the management with the resident.  ???  [x ] I reviewed the residents note and agree with the documented findings and plan of care.  [ ] I reviewed the residents note and agree with the documented findings and plan of care except:  ?   Patient seen and examined with resident. ?Agree with documented physical exam. ?Treatment plan reviewed and discussed with resident and is reasonable and appropriate. ??  Patient seen and examined on COVID 19 rounds. ?Have reviewed the symptom onset history, hospital day, and oxygen requirements as well as reviewed imaging and anticoagulation status. ?Continuing supplemental oxygenation, continuing precautions from an isolative recommendation. ?Antimicrobial history reviewed as well. ??

## 2022-05-03 NOTE — HISTORICAL [facility] CERNER
This is a historical note converted from Evelina. Formatting and pictures may have been removed.  Please reference Evelina for original formatting and attached multimedia. Chief Complaint  Abdominal Pain  Reason for Consultation  Cardiac History, Blood Anticoagulation therapy for Surgery  History of Present Illness  63-year-old -American male with significant past medical history of CAD?status post NSTEMI, ischemic cardiomyopathy,?hypertension,?paroxysmal?atrial fibrillation on?Xarelto,?heart failure with preserved EF?of >55%, second degree AV block status post pacemaker, hyperlipidemia, history of DVT,?and carcinoid mass s/p small bowel resection in 2018 presented to the ED with abdominal pain x 1 day. Pain is epigastric and constant in nature. Non radiating and is associated with nausea but no emesis. CT abdomen/ pelvis showed a developing small bowel obstruction of the left upper quadrant with transition point, surgery was consulted for evaluation. Patient is admitted to the medical unit for SBO, treating at this time with conservative measures (NGT, NPO). Medicine was then consulted due to patients extensive cardiac history in the setting of daily anticoagulation therapy.  ?   Patient was last seen in cardiology clinic in 3/2019, where his anticoagulation therapy for his paroxysmal atrial fibrillation was changed from Eliquis to Xarelto due to insurance reasons. Currently asymptomatic, denies palpitations.? Last interrogation of PPM was in 3/2019, that showed normal ICD function and mode switched for better?control of Atrial Fibrillation?with RVR.?States that he has been compliant with all of his medications daily.Continued Metoprolol Tartrate, Lisinopril, Diltiazem, Imdur, Spironolactone, Atorvastatin, and Amlodipine without any dose changes per cardiology. Last echocardiogram in 11/2018 showed EF of >55%, mild LVH, mild mitral regurgitation, mild tricuspid regurgitation with RVSP of 33mmHg and moderately  dilated left atrium. Denies chest pain, SOB, orthopnea, PND, or LE edema.  ?   Social History: Smoked 5 cigarettes per day for ~20years, quit in the 1970s. Denies any current alcohol or illicit drug use  Surgical History: Exploration Laparotomy, PPM, Angiogram, Small Bowel Resection  Family History: Mother -CVA, Father - CVA  Review of Systems  Constitutional:?no fever, chills, or generalized fatigue/weakness  Respiratory:?no cough, wheezing, or shortness of breath  Cardiovascular:?no chest pain, palpitations,?or edema, no orthopnea, no PND  Gastrointestinal:?(+) abdominal pain,?(+)nausea, no vomiting or diarrhea  Genitourinary:?no dysuria, urinary frequency, urgency, or hematuria  Musculoskeletal:?no muscle or joint pain, no joint swelling  Integumentary:?no skin rash or abnormal lesion  Neurologic: no headache, no dizziness, no focal?weakness or numbness  Physical Exam  Vitals & Measurements  T:?36.6? ?C (Oral)? TMIN:?36.3? ?C (Oral)? TMAX:?36.6? ?C (Oral)? HR:?85(Peripheral)? RR:?18? BP:?125/86? SpO2:?96%? WT:?97?kg?  General: AAO x3,?well-developed, in no acute distress, (+)laying comfortably in the stretcher  Eye: PERRLA, EOMI, clear conjunctiva, eyelids normal, no scleral icterus  Respiratory:? normal respiratory effort, equal breath sounds B/L, clear to auscultation B/L, no wheezes, crackles, or rales  Cardiovascular:?RRR without murmurs, gallops or rubs, normal S1/S1, normal peripheral pulses  Gastrointestinal:?non-distended, normal BS, soft, (+)mild diffuse tenderness, without masses to palpation  Musculoskeletal:?normal active and passive ROM without pain, no swelling/edema  Integumentary: no rashes or skin lesions present, warm, dry, and normal tone for race  Neurologic: moves all four extremities spontaneously, cognition intact, no facial droop noted, speech clear and coherent  Assessment/Plan  Small Bowel Obstruction  CAD with history of NSTEMI  Ischemic Cardiomyopathy  Hypertension  Paroxysmal Atrial  Fibrillation On Xarelto  Heart Failure with preserved EF of >55%  Second Degree AV Block s/p PPM  Hyperlipidemia  Carcinoid Mass s/p Small Bowel Resection in 11/2018  ?  Essentially this is a 64yo AAM with extensive cardiac history admitted for SBO, most likely secondary to surgical adhesions with recent abdominal surgery. At this time, patient is endorsing no chest pain, PND, orthopnea or LE edema. Thus?suspicion?for heart failure exacerbation or ACS is low. Would recommend continuing home medication at this time, NPO except medications. Monitor fluid status with strict I&Os. Place cardiac continuous cardiac monitoring while inpatient. EKG appeared to be Atrial Fibrillation with regular rate, no new ST changes when compared to the old ones. Anticoagulation, Xarelto, will need to be held 48-72hrs prior to any surgical procedures. However, should still be continued while being treated conservatively to prevent thrombus.Overall, patient is well controlled on his home medications, and appears at baseline from a cardiac standpoint.  ?   Thank you for your consultation. Should any further questions or cardiac issues arise, feel free to contract Medicine again.   Problem List/Past Medical History  Ongoing  Atrial fibrillation  CAD - Coronary artery disease  Cardiac arrest with ventricular fibrillation  HTN (hypertension)  Hyperlipidemia  Knowledge deficit  MI - Myocardial infarction  Obesity  Procedure/Surgical History  Colonoscopy (None) (12/27/2018)  Colonoscopy, flexible; with removal of tumor(s), polyp(s), or other lesion(s) by snare technique (12/27/2018)  Excision of Sigmoid Colon, Via Natural or Artificial Opening Endoscopic, Diagnostic (12/27/2018)  Polypectomy (None) (12/27/2018)  Insertion of Infusion Device into Superior Vena Cava, Percutaneous Approach (11/05/2018)  Diagnostic Laparoscopic (None) (11/02/2018)  Excision of Mesentery, Open Approach (11/02/2018)  Excision of Small Intestine, Open Approach  (11/02/2018)  Exploration Laparotomy (None) (11/02/2018)  Inspection of Lower Intestinal Tract, Percutaneous Endoscopic Approach (11/02/2018)  Small Bowel Resection (None) (11/02/2018)  Insertion of Infusion Device into Upper Vein, Percutaneous Approach (11/01/2018)  Revision of Cardiac Lead in Heart, Percutaneous Approach (07/11/2018)  Insertion of Defibrillator Generator into Chest Subcutaneous Tissue and Fascia, Open Approach (06/18/2018)  Removal of Cardiac Rhythm Related Device from Trunk Subcutaneous Tissue and Fascia, Open Approach (06/18/2018)  Insertion of Defibrillator Generator into Chest Subcutaneous Tissue and Fascia, Open Approach (05/28/2018)  Insertion of Pacemaker Lead into Right Ventricle, Percutaneous Approach (05/28/2018)  Removal of Cardiac Lead from Heart, Percutaneous Approach (05/28/2018)  Removal of Cardiac Rhythm Related Device from Trunk Subcutaneous Tissue and Fascia, Open Approach (05/28/2018)  Removal of Stimulator Generator from Trunk Subcutaneous Tissue and Fascia, Open Approach (05/28/2018)  Fluoroscopy of Left Heart using Low Osmolar Contrast (05/25/2018)  Fluoroscopy of Multiple Coronary Arteries using Low Osmolar Contrast (05/25/2018)  Measurement of Cardiac Sampling and Pressure, Left Heart, Percutaneous Approach (05/25/2018)  Cardiopulmonary resuscitation (eg, in cardiac arrest) (03/08/2018)  Fluoroscopy of Left Heart using Low Osmolar Contrast (03/08/2018)  Fluoroscopy of Multiple Coronary Arteries using Low Osmolar Contrast (03/08/2018)  Measurement of Cardiac Sampling and Pressure, Left Heart, Percutaneous Approach (03/08/2018)  Insertion of Pacemaker Lead into Right Atrium, Percutaneous Approach (11/13/2017)  Insertion of Pacemaker Lead into Right Ventricle, Percutaneous Approach (11/13/2017)  Insertion of Pacemaker, Dual Chamber into Chest Subcutaneous Tissue and Fascia, Open Approach (11/13/2017)  Fluoroscopy of Left Heart using Low Osmolar Contrast  (11/10/2017)  Fluoroscopy of Multiple Coronary Arteries using Low Osmolar Contrast (11/10/2017)  Measurement of Cardiac Sampling and Pressure, Left Heart, Percutaneous Approach (11/10/2017)  Performance of Cardiac Pacing, Continuous (11/10/2017)  Angiogram  Pacemaker   Medications  Inpatient  Aldactone 25 mg oral tablet, 25 mg= 1 tab(s), Oral, BID  amlodipine 10 mg oral tablet, 10 mg= 1 tab(s), Oral, Daily  aspirin 81 mg oral tablet, CHEWABLE, 81 mg= 1 tab(s), Oral, Daily  atorvastatin 20 mg oral tablet, 40 mg= 2 tab(s), Oral, Daily  Cardizem  mg/24 hours oral CAPsule, extended release, 180 mg= 1 cap(s), Oral, Daily  heparin, 7048 units= 1.41 mL, 80 unit/kg, IV Push, Once  IVF Normal Saline NS Bolus 1000ml 1,000 mL, 1000 mL, IV  IVF Normal Saline NS Infusion 1,000 mL, 1000 mL, IV  lisinopril, 40 mg= 4 tab(s), Oral, Daily  methocarbamol 500 mg oral tablet, 1000 mg= 2 tab(s), Oral, TID, PRN  metoprolol tartrate 25 mg oral tab, 25 mg= 1 tab(s), Oral, BID  morphine, 1 mg= 0.5 mL, IV Push, q2hr, PRN  potassium chloride 20 mEq oral TABLET extended release, 20 mEq= 1 tab(s), Oral, Daily  Zofran 2 mg/mL injectable solution, 4 mg= 2 mL, IV Push, q8hr, PRN  Home  acetaminophen-codeine 300 mg-30 mg oral tablet., 1 tab(s), Oral, q4hr, PRN  Aldactone 25 mg oral tablet, 25 mg= 1 tab(s), Oral, BID, 3 refills  amlodipine 10 mg oral tablet, 10 mg= 1 tab(s), Oral, Daily, 3 refills  aspirin 81 mg oral tablet, CHEWABLE, 81 mg= 1 tab(s), Oral, Daily, 3 refills  atorvastatin 40 mg oral tablet, 40 mg= 1 tab(s), Oral, Daily, 3 refills  diltiazem 180 mg/24 hours oral TABlet, extended release, 180 mg= 1 tab(s), Oral, Daily, 3 refills  lisinopril 40 mg oral tablet, 40 mg= 1 tab(s), Oral, Daily, 3 refills  methocarbamol 500 mg oral tablet, 1000 mg= 2 tab(s), Oral, TID, PRN  metoprolol tartrate 25 mg oral tab, 25 mg= 1 tab(s), Oral, BID, 3 refills  potassium chloride 20 mEq oral TABLET extended release, 20 mEq= 1 tab(s), Oral, Daily, 9  refills  Promethazine DM 6.25 mg-15 mg/5 mL oral syrup, 5 mL, Oral, q6hr, PRN  Xarelto 20mg Tablet, 20 mg= 1 tab(s), Oral, Daily  Allergies  No Known Allergies  Social History  Alcohol  Never, 10/22/2018  Employment/School  Employed, Activity level: Moderate physical work. Highest education level: High school., 03/24/2015  Home/Environment  Lives with lives with sister. Living situation: Home/Independent. Alcohol abuse in household: No. Substance abuse in household: No. Smoker in household: No. Injuries/Abuse/Neglect in household: No. Feels unsafe at home: No. Family/Friends available for support: Yes. Concern for family members at home: No. Major illness in household: No. Financial concerns: No. TV/Computer concerns: No., 03/24/2015  Nutrition/Health  Type of diet: cardiac. cardiac, Caffeine intake amount: caffeine free mostly. Wants to lose weight: Yes. Sleeping concerns: No. Feels highly stressed: No., 01/16/2018  Sexual  Gender Identity Identifies as male., 02/28/2019  Substance Abuse  Never, 10/22/2018  Tobacco  Former smoker, quit more than 30 days ago, N/A, 05/26/2019  Never (less than 100 in lifetime), N/A, 05/13/2019  Former smoker, quit more than 30 days ago, N/A, 03/19/2019  Family History  Stroke: Mother and Father.  Immunizations  Vaccine Date Status Comments   influenza virus vaccine, inactivated - Not Given Patient Refuses     tetanus/diphtheria/pertussis, acel(Tdap) 05/28/2016 Given other (see comment)   Lab Results  Test Name Test Result Date/Time   Sodium Lvl 141 mmol/L 05/26/2019 15:20 CDT   Potassium Lvl 3.5 mmol/L 05/26/2019 15:20 CDT   Chloride 108 mmol/L (High) 05/26/2019 15:20 CDT   CO2 29 mmol/L 05/26/2019 15:20 CDT   Calcium Lvl 8.7 mg/dL 05/26/2019 15:20 CDT   BUN 14 mg/dL 05/26/2019 15:20 CDT   Creatinine 1.10 mg/dL 05/26/2019 15:20 CDT   Amylase Lvl 67 unit/L 05/26/2019 15:20 CDT   Bili Total 1.3 mg/dL (High) 05/26/2019 15:20 CDT   Bili Direct 0.3 mg/dL 05/26/2019 15:20 CDT   Bili  Indirect 1.0 mg/dL 05/26/2019 15:20 CDT   AST 31 unit/L 05/26/2019 15:20 CDT   ALT 45 unit/L 05/26/2019 15:20 CDT   Alk Phos 94 unit/L 05/26/2019 15:20 CDT   WBC 7.6 x10(3)/mcL 05/26/2019 15:20 CDT   RBC 5.27 x10(6)/mcL 05/26/2019 15:20 CDT   Hgb 15.3 gm/dL 05/26/2019 15:20 CDT   Hct 45.4 % 05/26/2019 15:20 CDT   Platelet 250 x10(3)/mcL 05/26/2019 15:20 CDT   Diagnostic Results  XAMINATION: CT the abdomen pelvis with contrast  ?  EXAMINATION DATE: 5/26/2019  ?  COMPARISON: 12/26/2018  ?  TECHNIQUE: Multiple cross-sectional images of the abdomen and pelvis  were obtained at the intravenous ministration of contrast. Sagittal  reformatted images were obtained.  ?  CLINICAL HISTORY: Abdominal pain  ?  FINDINGS:  ?  Dependent atelectatic changes lungs. The heart size is enlarged with  coronary artery calcifications.  ?  The liver is enlarged with diffusely hypodense parenchyma hepatic  steatosis., Adrenals, and kidneys are normal. Gallbladder is present  with cholelithiasis. The pancreas appears normal without hypervascular  mass.  ?  Dilated loops of small bowel the the left upper quadrant with the  transition zone identified on image #36 of series 6 is likely  secondary to small bowel obstruction with mechanical obstruction.  Fecalization is identified. No evidence for pneumatosis or  pneumoperitoneum. The distal small bowel is of normal  caliber/decompressed. Postsurgical changes are identified small bowel  resection with intact anastomosis. Colon is also normal caliber. The  appendix is air-filled and normal.  ?  The prostate is enlarged with dystrophic calcifications. The bladder  is underdistended normal. No free fluid in abdomen pelvis. No  lymphadenopathy. Previously described mesenteric lesion is again  identified and smaller. Previously described mesenteric lesion is  smaller when compared to the prior examination now with maximum  dimension of 1.5 x 0.9 cm. This is best identified and 47 of series 2.  Course  and caliber of the abdominal aorta is normal. Degenerative  changes of the lumbar spine. No cyst  ?  IMPRESSION:?  1. Developing small bowel obstruction of the left upper quadrant with  transition point identified as above.  2. Decreasing mesenteric lesion when compared to prior examination.  3. Other secondary findings are above.  ?  ?  Signature Line  Electronically Signed By: Rico Ornelas DO  Date/Time Signed: 05/26/2019 17:18  ?      Agree with above

## 2022-05-03 NOTE — HISTORICAL [facility] CERNER
This is a historical note converted from Evelina. Formatting and pictures may have been removed.  Please reference [facility] for original formatting and attached multimedia. Chief Complaint  Heart racing  History of Present Illness  62 year old AAM with a history of A fib s/p pacemaker placement, CAD, HTN, HLD, and MI (2003) presents with a 2 week history of?non-progressive, constant?fatigue.? Today, patient was at his follow up appointment to have his staples removed from his pacemaker placement 11/13/17 when he was noted to have a HR in the 160s and low BP.? They recommended he come to the hospital.? Associated symptoms include burning, intermittent, mid-sternal chest pain after eating and a non-productive cough for 1 day.? He denies any SOB, abdominal pain, recent illness, nausea, vomiting, diarrhea, constipation, headache, or changes in vision.?  ?   MHx: atrial fibrillation, CAD, HTN, HLD, MI (2003)  SHx: angiogram 2003, pacemaker placed 11/13/17  FHx: father- stroke, DM; mother- pacemaker, stroke  Social: quit smoking in 1978, previously smoked 4 packs/day for >5 years; quit alcohol in 1978; occasionally smokes marijuana; denies illicit drug use  Review of Systems  Constitutional symptoms: No fever, no chills, +fatigue.  Skin symptoms: No rash, no pruritus, no lesion.  Eye symptoms: No recent vision problems.  ENMT symptoms: no sore throat, no nasal congestion.  Respiratory symptoms: No shortness of breath,?+nonproductive cough.  Cardiovascular symptoms:?+mid-sternal chest pain, no peripheral edema.  Gastrointestinal symptoms: No abdominal pain, no nausea, no vomiting, no diarrhea.  Genitourinary symptoms: No dysuria, no hematuria.  Musculoskeletal symptoms: No Joint pain.  Neurologic symptoms: No headache, no dizziness.  Psychiatric symptoms: No anxiety, No depression.  Hematologic/Lymphatic symptoms: Bleeding tendency negative.  Additional review of systems information: All other systems reviewed and otherwise  negative, All systems reviewed as documented in chart.  Physical Exam  General: AAOx3, NAD  Eye: PERRLA, EOMI, nml conjunctiva  Respiratory: CTA in all lung fields, non-labored, BS equal  Cardiovascular:?irregularly irregular rhythm,?no M/R/G, distal pulses palpated and symmetric  GI: No TTP x 4, active BS, No guarding  : Soft, No CVA tenderness, No suprapubic tenderness  Skin: Warm, no rash, pacemaker incision without surrounding erythema or exudate with Steri-strips in place  Neuro: Cranial Nerves II-XII are grossly intact, No focal deficits, Normal motor function  Psychiatric: Cooperative, Appropriate mood & affect  Assessment/Plan  1. A-fib with RVR  2. HFpEF - EF 55%, diastolic dysfunction, NYHA II-III  3. CAD - Angiogram and pacemaker 11/2017, no significant stenosis  4. PVD - stent in 2003  5. HLD - statin  ?  Admit from cardiology clinic to tele on observation. Symptomatic A-fib with RVR, treated with ASA 81 mg. CHADSVASC of 3 for CHF (questionable), HTN, and PVD. Candidate for NOAC, defer to cardiology. Current recs are lovenox 1 mg/kg BID until possible cardioversion. One dose of Cardizem IV 5 mg decreased HR to less than 110, remains in A-fib. Recent TTE shows good EF with some diastolic dysfunction. CXR shows no evidence of CHF and no edema on exam. Angiogram from this year is also negative for significant disease. Will optimize medical management and monitor on tele.  ?  Consults: Cards  PPX: Lovenox  Dispo: Observation, possible DC tomorrow, possible cardioversion by cards  ?  ?  ?  Medications (5) Active  Scheduled: (4)  aspirin 81 mg Chew tab ?81 mg 1 tab(s), Oral, Daily  enoxaparin ?1 mg/kg, Subcutaneous, BID  fluticasone nasal 0.05 mg/inh Spr ?100 mcg 2 spray(s), Nasal, BID  simvastatin 40 mg Tab UD ?40 mg 1 tab(s), Oral, Once a day (at bedtime)  Continuous: (0)  PRN: (1)  diltiazem 5 mg/ml Inj ?5 mg 1 mL, IV Push, q3hr  ?   Problem List/Past Medical History  Ongoing  Atrial fibrillation  CAD -  Coronary artery disease  HTN (hypertension)  Hyperlipidemia  Hypertension  MI - Myocardial infarction  Obesity  Historical  Procedure/Surgical History  Insertion of Pacemaker Lead into Right Atrium, Percutaneous Approach (11/13/2017)  Insertion of Pacemaker Lead into Right Ventricle, Percutaneous Approach (11/13/2017)  Insertion of Pacemaker, Dual Chamber into Chest Subcutaneous Tissue and Fascia, Open Approach (11/13/2017)  Fluoroscopy of Left Heart using Low Osmolar Contrast (11/10/2017)  Fluoroscopy of Multiple Coronary Arteries using Low Osmolar Contrast (11/10/2017)  Measurement of Cardiac Sampling and Pressure, Left Heart, Percutaneous Approach (11/10/2017)  Performance of Cardiac Pacing, Continuous (11/10/2017)  Angiogram  Pacemaker  Medications  Inpatient  aspirin 81 mg oral tablet, CHEWABLE, 81 mg= 1 tab(s), Oral, Daily  fluticasone 50 mcg/inh nasal spray, 100 mcg= 2 spray(s), Nasal, BID  simvastatin 40 mg oral tablet, 40 mg= 1 tab(s), Oral, Once a day (at bedtime)  Home  aspirin 81 mg oral tablet, CHEWABLE, 81 mg= 1 tab(s), Oral, Daily, 5 refills  cephalexin 500 mg oral capsule, 500 mg= 1 cap(s), Oral, q12hr  fluticasone 50 mcg/inh nasal spray, 2 spray(s), Nasal, BID  HYDRALAZINE  MG TABS  HYDROCO/APAP TAB 7.5-325  ISOSORBIDE MONONITRATE ER 30 MG TB24, 30 mg= 1 tab(s), Oral, Daily  Lasix 20 mg oral tablet, 40 mg= 2 tab(s), Oral, Daily  lisinopril 40 mg oral tablet, 40 mg= 1 tab(s), Oral, Daily, 8 refills  NIFEdipine 90 mg oral tablet, extended release, 90 mg= 1 tab(s), Oral, Daily, 9 refills  potassium chloride 20 mEq oral TABLET extended release, 20 mEq= 1 tab(s), Oral, BID, 1 refills  simvastatin 40 mg oral tablet, 40 mg= 1 tab(s), Oral, Once a day (at bedtime), 1 refills  Allergies  No Known Allergies  Social History  Alcohol - 07/12/2015  Never  Employment/School - 07/12/2015  Employed, Activity level: Moderate physical work. Highest education level: High school.  Exercise - 07/12/2015  Self  assessment: Good condition.  Home/Environment - 07/12/2015  Lives with lives with sister. Living situation: Home/Independent. Alcohol abuse in household: No. Substance abuse in household: No. Smoker in household: No. Injuries/Abuse/Neglect in household: No. Feels unsafe at home: No. Family/Friends available for support: Yes. Concern for family members at home: No. Major illness in household: No. Financial concerns: No. TV/Computer concerns: No.  Nutrition/Health - 07/12/2015  Type of diet: regular. Regular, Caffeine intake amount: caffeine free mostly. Wants to lose weight: Yes. Sleeping concerns: No. Feels highly stressed: No.  Other - 07/12/2015  Substance Abuse - 07/12/2015  Never  Tobacco - 07/12/2015  Never smoker  Family History  Stroke: Mother and Father.  Lab Results  Labs Last 24 Hours?  ?Chemistry Hematology/Coagulation   Sodium Lvl: 143 mmol/L (12/14/17 13:23:14) PT: 13.4 second(s) (12/14/17 13:09:43)   Potassium Lvl:?3.4 mmol/L?Low (12/14/17 13:23:14) INR: 1.04 (12/14/17 13:09:43)   Chloride:?111 mmol/L?High (12/14/17 13:23:14) WBC: 5.7 x10(3)/mcL (12/14/17 13:03:35)   CO2: 25 mmol/L (12/14/17 13:23:14) RBC: 5.35 x10(6)/mcL (12/14/17 13:03:35)   Calcium Lvl:?8.2 mg/dL?Low (12/14/17 13:23:14) Hgb: 15.3 gm/dL (12/14/17 13:03:35)   Magnesium Lvl: 2 mg/dL (12/14/17 13:23:18) Hct: 43.5 % (12/14/17 13:03:35)   Glucose Lvl: 97 mg/dL (12/14/17 13:23:14) Platelet: 231 x10(3)/mcL (12/14/17 13:03:35)   BUN: 17 mg/dL (12/14/17 13:23:14) MCV: 81.3 fL (12/14/17 13:03:35)   Creatinine: 1.1 mg/dL (12/14/17 13:23:14) MCH: 28.6 pg (12/14/17 13:03:35)   eGFR-AA:?88 mL/min?Low (12/14/17 13:23:15) MCHC: 35.2 gm/dL (12/14/17 13:03:35)   eGFR-SAJI:?72 mL/min?Low (12/14/17 13:23:17) RDW: 13.3 % (12/14/17 13:03:35)   Bili Total: 0.8 mg/dL (12/14/17 13:23:14) MPV:?10.8 fL?High (12/14/17 13:03:35)   Bili Direct: 0.2 mg/dL (12/14/17 13:23:14) Abs Neut: 2.98 x10(3)/mcL (12/14/17 13:03:35)   Bili Indirect: 0.6 mg/dL (12/14/17  13:23:14) Neutro Auto: 52 x10(3)/mcL (12/14/17 13:03:36)   AST: 23 unit/L (12/14/17 13:23:14) Lymph Auto: 35 % (12/14/17 13:03:36)   ALT: 30 unit/L (12/14/17 13:23:14) Mono Auto: 12 % (12/14/17 13:03:36)   Alk Phos: 80 unit/L (12/14/17 13:23:14) Eos Auto: 0 % (12/14/17 13:03:36)   Total Protein: 7.2 gm/dL (12/14/17 13:23:14) Abs Eos: 0.02 (12/14/17 13:03:36)   Albumin Lvl: 3.6 gm/dL (12/14/17 13:23:14) Basophil Auto: 1 % (12/14/17 13:03:36)   Globulin:?3.6 gm/mL?High (12/14/17 13:23:14) Abs Neutro: 2.98 x10(3)/mcL (12/14/17 13:03:36)   A/G Ratio: 1 ratio (12/14/17 13:23:14) Abs Lymph: 1.98 x10(3)/mcL (12/14/17 13:03:36)   NT pro BNP.:?3363 pg/mL?High (12/14/17 13:22:55) Abs Mono: 0.66 x10(3)/mcL (12/14/17 13:03:36)   Total CK: 126 unit/L (12/14/17 13:23:16) Abs Baso: 0.05 x10(3)/mcL (12/14/17 13:03:36)   CK MB: 3.6 ng/mL (12/14/17 13:23:21) IG%: 0 % (12/14/17 13:03:36)   Troponin-I: 0.024 ng/mL (12/14/17 13:23:20) IG#: 0.02 (12/14/17 13:03:36)   TSH: 2.88 mIU/L (12/14/17 13:23:19)    ?  ?  ?  ?  Diagnostic Results  Accession:?[id number]  Order:?XR Chest 1 View  Report Dt/Tm:?12/14/2017 12:54  Report:?  History:  Cough  ?  Reference:  28 November 2017  ?  Findings:  Portable frontal view of the chest was obtained. Stable left  subclavian pacing device. The heart is not significantly enlarged. The  lungs appear clear. There is no pneumothorax.  ?  Impression:?  No acute findings.  ?  ?  ?

## 2022-05-03 NOTE — HISTORICAL [facility] CERNER
This is a historical note converted from [place]. Formatting and pictures may have been removed.  Please reference [place] for original formatting and attached multimedia. Chief Complaint  Fatigue?x2 weeks  History of Present Illness  62 year old AAM with a history of A fib s/p pacemaker placement, CAD, HTN, HLD, and MI (2003) presents with a 2 week history of?non-progressive, constant?fatigue.? Today, patient was at his follow up appointment to have his staples removed from his pacemaker placement 11/13/17 when he was noted to have a HR in the 160s and low BP.? They recommended he come to the hospital.? Associated symptoms include burning, intermittent, mid-sternal chest pain after eating and a non-productive cough for 1 day.? He denies any SOB, abdominal pain, recent illness, nausea, vomiting, diarrhea, constipation, headache, or changes in vision.?  ?  MHx: atrial fibrillation, CAD, HTN, HLD, MI (2003)  SHx: angiogram 2003, pacemaker placed 11/13/17  FHx: father- stroke, DM; mother- pacemaker, stroke  Social: quit smoking in 1978, previously smoked 4 packs/day for >5 years; quit alcohol in 1978; occasionally smokes marijuana; denies illicit drug use  Review of Systems  Constitutional symptoms: No fever, no chills, +fatigue.  Skin symptoms: No rash, no pruritus, no lesion.  Eye symptoms: No recent vision problems.  ENMT symptoms: no sore throat, no nasal congestion.  Respiratory symptoms: No shortness of breath,?+nonproductive cough.  Cardiovascular symptoms:?+mid-sternal chest pain, no peripheral edema.  Gastrointestinal symptoms: No abdominal pain, no nausea, no vomiting, no diarrhea.  Genitourinary symptoms: No dysuria, no hematuria.  Musculoskeletal symptoms: No Joint pain.  Neurologic symptoms: No headache, no dizziness.  Psychiatric symptoms: No anxiety, No depression.  Hematologic/Lymphatic symptoms: Bleeding tendency negative.  Additional review of systems information: All other systems reviewed and  otherwise negative, All systems reviewed as documented in chart.  ?  Physical Exam  General: AAOx3, NAD  Eye: PERRLA, EOMI, nml conjunctiva  Respiratory: CTA in all lung fields, non-labored, BS equal  Cardiovascular:?irregularly irregular rhythm,?no M/R/G, distal pulses palpated and symmetric  GI: No TTP x 4, active BS, No guarding  : Soft, No CVA tenderness, No suprapubic tenderness  Skin: Warm, no rash, pacemaker incision without surrounding erythema or exudate with steristrips in place  Neuro: Cranial Nerves II-XII are grossly intact, No focal deficits, Normal motor function  Psychiatric: Cooperative, Appropriate mood & affect  ?  Assessment/Plan  Assessment:  62 year old male with a history of A-fib s/p pacemaker placement, CAD, HTN, HLD, and MI presenting with fatigue, tachycardia, and hypotension  ?  Plan:  Fatigue in the setting of A-fib  -Will monitor patient on telemetry  -Troponins, CK, CK-MB negative  -NT-proBNP 3363  -TSH, PT, INR wnl  -Per cardiology, will attempt to cardiovert patient  ?  CAD with MI (2003)  -Per patient, had angiogram in 2003 that showed no blockages  ?  HTN  -Since pacemaker placement, stopped amlodipine  -Currently takes nifedipine, hydralazine, and lisinopril  -Hold medication as patient has been hypotensive  ?  HLD  -Takes simvastatin  ?   Problem List/Past Medical History  Ongoing  Atrial fibrillation  CAD - Coronary artery disease  HTN (hypertension)  Hyperlipidemia  Hypertension  MI - Myocardial infarction  Obesity  Historical  Procedure/Surgical History  Insertion of Pacemaker Lead into Right Atrium, Percutaneous Approach (11/13/2017)  Insertion of Pacemaker Lead into Right Ventricle, Percutaneous Approach (11/13/2017)  Insertion of Pacemaker, Dual Chamber into Chest Subcutaneous Tissue and Fascia, Open Approach (11/13/2017)  Fluoroscopy of Left Heart using Low Osmolar Contrast (11/10/2017)  Fluoroscopy of Multiple Coronary Arteries using Low Osmolar Contrast  (11/10/2017)  Measurement of Cardiac Sampling and Pressure, Left Heart, Percutaneous Approach (11/10/2017)  Performance of Cardiac Pacing, Continuous (11/10/2017)  Angiogram  Pacemaker  Medications  Inpatient  aspirin 81 mg oral tablet, CHEWABLE, 81 mg= 1 tab(s), Oral, Daily  fluticasone 50 mcg/inh nasal spray, 100 mcg= 2 spray(s), Nasal, BID  simvastatin 40 mg oral tablet, 40 mg= 1 tab(s), Oral, Once a day (at bedtime)  Home  aspirin 81 mg oral tablet, CHEWABLE, 81 mg= 1 tab(s), Oral, Daily, 5 refills  cephalexin 500 mg oral capsule, 500 mg= 1 cap(s), Oral, q12hr  fluticasone 50 mcg/inh nasal spray, 2 spray(s), Nasal, BID  HYDRALAZINE  MG TABS  HYDROCO/APAP TAB 7.5-325  ISOSORBIDE MONONITRATE ER 30 MG TB24, 30 mg= 1 tab(s), Oral, Daily  Lasix 20 mg oral tablet, 40 mg= 2 tab(s), Oral, Daily  lisinopril 40 mg oral tablet, 40 mg= 1 tab(s), Oral, Daily, 8 refills  NIFEdipine 90 mg oral tablet, extended release, 90 mg= 1 tab(s), Oral, Daily, 9 refills  potassium chloride 20 mEq oral TABLET extended release, 20 mEq= 1 tab(s), Oral, BID, 1 refills  simvastatin 40 mg oral tablet, 40 mg= 1 tab(s), Oral, Once a day (at bedtime), 1 refills  Allergies  No Known Allergies  Social History  Alcohol - 07/12/2015  Never  Employment/School - 07/12/2015  Employed, Activity level: Moderate physical work. Highest education level: High school.  Exercise - 07/12/2015  Self assessment: Good condition.  Home/Environment - 07/12/2015  Lives with lives with sister. Living situation: Home/Independent. Alcohol abuse in household: No. Substance abuse in household: No. Smoker in household: No. Injuries/Abuse/Neglect in household: No. Feels unsafe at home: No. Family/Friends available for support: Yes. Concern for family members at home: No. Major illness in household: No. Financial concerns: No. TV/Computer concerns: No.  Nutrition/Health - 07/12/2015  Type of diet: regular. Regular, Caffeine intake amount: caffeine free mostly. Wants to  lose weight: Yes. Sleeping concerns: No. Feels highly stressed: No.  Other - 07/12/2015  Substance Abuse - 07/12/2015  Never  Tobacco - 07/12/2015  Never smoker  Family History  Stroke: Mother and Father.

## 2022-05-03 NOTE — HISTORICAL [facility] CERNER
This is a historical note converted from [place]. Formatting and pictures may have been removed.  Please reference [place] for original formatting and attached multimedia. Chief Complaint  c/o continued weakness and dizziness. stated almost fell in bathroom after dc earlier  Reason for Consultation  Dizziness.  History of Present Illness  Daniel?is a 64-year-old?AAM with PMH hypertension, hyperlipidemia, CAD with history of an STEMI in 2003, paroxysmal A. fib on Eliquis, HFpEF of 55% as of 11/2018 and second-degree AV block?s/p?[place] [facility] PPM 11/13/2017 (up-graded to dual chamber ICD?5/18 2/2 V-fib arrest in 3/2018 in setting of prolonged QT and hypokalemia) ?who presented to [facility] ED on 2/2?with complaints of dizziness?and weakness.  He says he has been feeling weak, came to the ED was discharged after normal vitals, but when ambulating became more symptomatic necessitating admission.  ?   [facility] 3/2018 mild CAD  Orthostatic vitals: Supine 127/103/89?? Standing 137/102/107?? Sitting 144/96/99  ICD interrogation shows Afib RVR with rates going up the 180s.  CT head negative. CTA head/neck negative. MRI not done given ICD.  Previous interrogations have shown fast rates with mode switching during episodes of RVR.  TTE (2/3/2020) with mild LVH, 55%, RVSP of 28 mmHG, mildly dilated LAE with normal HERNANDEZ.  ?   The patient doesnt think he has had a MENDEL/DCCV or MENDEL/DCCV in the past, nor can he say for certain how long he has had Afib ( at least 11/2018 has documented atrial fibrillation)  ?  ?  Cardiac device course:  -11/13/17 initial PPM placed  -5/29/18 upgraded to dual chamber pacemaker/defibrillator  -6/15/18 device removal 2/2 device malfunction and development of clots  -6/18/18 pacemaker/defibrillator replaced and completed course of Keflex  Review of Systems  Negative except as above  Physical Exam  Vitals & Measurements  T:?36.8? ?C (Oral)? TMIN:?36.8? ?C (Oral)? TMAX:?37.0? ?C (Oral)? HR:?79(Monitored)? RR:?18?  BP:?136/94? SpO2:?98%?  GEN: pleasant  CVS: irregular with no added heart-sounds  LUNGS: mild crackles in bases  ABD: Soft. NT  NEURO: No focal deficits appreciated.  PSYCH: Appropriate  Assessment/Plan  Atrial Fibrillation  Mobitz II s/p PPM and upgrade AICD 2/2 Vfib Arrest  - was receiving 120 mg Cardizem (home dose 180 mg XL which will be resumed), and titrate metoprolol to 25 mg q6 with holding parameters of SBP <85  - Systolics have been acceptable. Discontinue spironolactone and amlodipine.  ?  - Continue anticoagulation.  ?  ?  ?   Problem List/Past Medical History  Ongoing  Atrial fibrillation  CAD - Coronary artery disease  Cardiac arrest with ventricular fibrillation  HTN (hypertension)  Hyperlipidemia  Hypertension  Knowledge deficit  MI - Myocardial infarction  Obesity  Historical  No qualifying data  Procedure/Surgical History  Colonoscopy (None) (12/27/2018)  Colonoscopy, flexible; with removal of tumor(s), polyp(s), or other lesion(s) by snare technique (12/27/2018)  Excision of Sigmoid Colon, Via Natural or Artificial Opening Endoscopic, Diagnostic (12/27/2018)  Polypectomy (None) (12/27/2018)  Insertion of Infusion Device into Superior Vena Cava, Percutaneous Approach (11/05/2018)  Diagnostic Laparoscopic (None) (11/02/2018)  Excision of Mesentery, Open Approach (11/02/2018)  Excision of Small Intestine, Open Approach (11/02/2018)  Exploration Laparotomy (None) (11/02/2018)  Inspection of Lower Intestinal Tract, Percutaneous Endoscopic Approach (11/02/2018)  Small Bowel Resection (None) (11/02/2018)  Insertion of Infusion Device into Upper Vein, Percutaneous Approach (11/01/2018)  Revision of Cardiac Lead in Heart, Percutaneous Approach (07/11/2018)  Insertion of Defibrillator Generator into Chest Subcutaneous Tissue and Fascia, Open Approach (06/18/2018)  Removal of Cardiac Rhythm Related Device from Trunk Subcutaneous Tissue and Fascia, Open Approach (06/18/2018)  Insertion of Defibrillator  Generator into Chest Subcutaneous Tissue and Fascia, Open Approach (05/28/2018)  Insertion of Pacemaker Lead into Right Ventricle, Percutaneous Approach (05/28/2018)  Removal of Cardiac Lead from Heart, Percutaneous Approach (05/28/2018)  Removal of Cardiac Rhythm Related Device from Trunk Subcutaneous Tissue and Fascia, Open Approach (05/28/2018)  Removal of Stimulator Generator from Trunk Subcutaneous Tissue and Fascia, Open Approach (05/28/2018)  Fluoroscopy of Left Heart using Low Osmolar Contrast (05/25/2018)  Fluoroscopy of Multiple Coronary Arteries using Low Osmolar Contrast (05/25/2018)  Measurement of Cardiac Sampling and Pressure, Left Heart, Percutaneous Approach (05/25/2018)  Cardiopulmonary resuscitation (eg, in cardiac arrest) (03/08/2018)  Fluoroscopy of Left Heart using Low Osmolar Contrast (03/08/2018)  Fluoroscopy of Multiple Coronary Arteries using Low Osmolar Contrast (03/08/2018)  Measurement of Cardiac Sampling and Pressure, Left Heart, Percutaneous Approach (03/08/2018)  Insertion of Pacemaker Lead into Right Atrium, Percutaneous Approach (11/13/2017)  Insertion of Pacemaker Lead into Right Ventricle, Percutaneous Approach (11/13/2017)  Insertion of Pacemaker, Dual Chamber into Chest Subcutaneous Tissue and Fascia, Open Approach (11/13/2017)  Fluoroscopy of Left Heart using Low Osmolar Contrast (11/10/2017)  Fluoroscopy of Multiple Coronary Arteries using Low Osmolar Contrast (11/10/2017)  Measurement of Cardiac Sampling and Pressure, Left Heart, Percutaneous Approach (11/10/2017)  Performance of Cardiac Pacing, Continuous (11/10/2017)  Angiogram  Pacemaker   Medications  Inpatient  acetaminophen, 650 mg= 2 tab(s), Oral, q6hr, PRN  acetaminophen, 1000 mg= 2 tab(s), Oral, q6hr, PRN  Aldactone 25 mg oral tablet, 25 mg= 1 tab(s), Oral, BID  aspirin 81 mg oral tablet, CHEWABLE, 81 mg= 1 tab(s), Oral, Daily  atorvastatin 20 mg oral tablet, 20 mg= 1 tab(s), Oral, Daily  dilTIAZem 120 mg/24 hours  oral tablet, extended release, 120 mg= 1 cap(s), Oral, Daily  heparin, 7048 units= 1.41 mL, 80 unit/kg, IV Push, Once  losartan 50 mg oral tablet, 100 mg= 2 tab(s), Oral, Daily  metoprolol tartrate 25 mg oral tab, 25 mg= 1 tab(s), Oral, BID  Phenergan, 12.5 mg= 0.5 mL, IV Push, q4hr, PRN  Xarelto, 20 mg= 2 tab(s), Oral, Daily  Zofran, 4 mg= 2 mL, IV Push, q4hr, PRN  Home  Aldactone 25 mg oral tablet, 25 mg= 1 tab(s), Oral, BID, 3 refills  amlodipine 10 mg oral tablet, See Instructions, 6 refills  aspirin 81 mg oral tablet, CHEWABLE, 81 mg= 1 tab(s), Oral, Daily, 3 refills  atorvastatin 40 mg oral tablet, See Instructions, 6 refills  Cardizem  mg/24 hours oral TABlet, extended release, See Instructions, 6 refills  lisinopril 40 mg oral tablet, 40 mg= 1 tab(s), Oral, Daily  losartan 100 mg oral tablet, 100 mg= 1 tab(s), Oral, Daily, 3 refills  losartan 50 mg oral tablet, 50 mg= 1 tab(s), Oral, Daily  Metoprolol Tartrate 25 mg oral tablet, See Instructions, 6 refills  potassium chloride 20 mEq oral TABLET extended release, 20 mEq= 1 tab(s), Oral, Daily, 9 refills  Vitamin D 50,000 intl units oral capsule, 71978 IntUnit= 1 cap(s), Oral, qWeek  Xarelto 20mg Tablet, 20 mg= 1 tab(s), Oral, Daily, 6 refills  Allergies  No Known Allergies  Social History  Abuse/Neglect  No, No, Yes, 02/02/2020  Alcohol  Never, 09/23/2019  Employment/School  Employed, Activity level: Moderate physical work. Highest education level: High school., 03/24/2015  Home/Environment  Lives with lives with sister. Living situation: Home/Independent. Alcohol abuse in household: No. Substance abuse in household: No. Smoker in household: No. Injuries/Abuse/Neglect in household: No. Feels unsafe at home: No. Family/Friends available for support: Yes. Concern for family members at home: No. Major illness in household: No. Financial concerns: No. TV/Computer concerns: No., 03/24/2015  Nutrition/Health  Type of diet: cardiac. cardiac, Caffeine intake  amount: caffeine free mostly. Wants to lose weight: Yes. Sleeping concerns: No. Feels highly stressed: No., 01/16/2018  Sexual  Gender Identity Identifies as male., 02/28/2019  Substance Use  Never, 09/23/2019  Tobacco  Former smoker, quit more than 30 days ago, No, 02/02/2020  Former smoker, quit more than 30 days ago, N/A, 02/02/2020  Family History  Stroke: Mother and Father.  Immunizations  Vaccine Date Status Comments   pneumococcal 23-polyvalent vaccine 09/23/2019 Given    influenza virus vaccine, inactivated - Not Given Patient Refuses     tetanus/diphtheria/pertussis, acel(Tdap) 05/28/2016 Given other (see comment)

## 2022-05-03 NOTE — HISTORICAL [facility] CERNER
This is a historical note converted from Evelina. Formatting and pictures may have been removed.  Please reference Evelina for original formatting and attached multimedia. Chief Complaint  c/o continued weakness and dizziness. stated almost fell in bathroom after dc earlier  History of Present Illness  Mr. Daniel?is a 64-year-old?AAM with PMH hypertension, hyperlipidemia, CAD with history of an STEMI in 2003, paroxysmal A. fib on Eliquis, HFpEF of 55% as of 11/2018 and second-degree AV block?s/p pacemaker who presented to [facility] ED on 2/2?with complaints of dizziness?and weakness.? Patient states?he woke up this morning?and felt very weak and dizzy while trying to use the bathroom?but did not pass out. ?He had to hold onto something in order to steady himself.? He had to call the ambulance to bring him to the hospital.? Patient was initially discharged from the ED earlier this morning,?but?states when he went to the bathroom?he became?more weak and dizzy again.? He denied syncopal episode again.? He was able to ambulate without difficulty?and had no focal deficits.? EKG was performed which showed A. fib with?rate of 80.? Denies chest pain, shortness of breath,?diarrhea, constipation, recent illnesses, nausea/vomiting,?sick contacts, alcohol use.  ?   PMH: hypertension, hyperlipidemia, CAD with history of an STEMI in 2003, paroxysmal A. fib on Eliquis, HFpEF of 55% as of 11/2018 and second-degree AV block?s/p pacemaker  Surgical Hx:  Social Hx: Denies tobacco, alcohol, illicit drug use  Review of Systems  Constitutional:?no fever, fatigue, (+) weakness  Eye:?no vision loss, eye redness, drainage, or pain  ENMT:?no sore throat, ear pain, sinus pain/congestion, nasal congestion/drainage  Respiratory:?no cough, no wheezing, no shortness of breath  Cardiovascular:?no chest pain, no palpitations, no edema  Gastrointestinal:?no nausea, vomiting, or diarrhea. No abdominal pain  Genitourinary:?no dysuria, no urinary frequency or  urgency, no hematuria  Hema/Lymph:?no abnormal bruising or bleeding  Endocrine:?no heat or cold intolerance, no excessive thirst or excessive urination  Musculoskeletal:?no muscle or joint pain, no joint swelling  Integumentary:?no skin rash or abnormal lesion  Neurologic: no headache, (+)dizziness,?(+) weakness, no numbness  Physical Exam  Vitals & Measurements  T:?36.7? ?C (Oral)? HR:?89(Peripheral)? RR:?18? BP:?147/99? BP:?144/96(Sitting)? BP:?137/102(Standing)? BP:?127/103(Supine)? SpO2:?99%? WT:?102?kg? BMI:?33.19?  General: well-nourished, well-developed in no acute distress  HEENT: Atraumatic, normocephalic.  Neck: No JVD or carotid bruits. No lymphadenopathy.  Heart: RRR, no murmurs, gallops, clicks or rubs. S1, S2 present.?  Lungs: CTAB without rales, wheezes or rhonchi. Normal work of breathing. Chest rise symmetrical on inspiration.  Abd: Soft, non-tender, non-distended and without guarding. Bowel sounds present.  Extremities: Radial and pedal pulses 2+ bilaterally, no LE edema.  MSK: Moves all extremities purposefully.  Skin: Warm, dry and without rashes.  Neuro: Unilateral quadrantanopia,?unable to follow?pen tip?to left with both eyes,?strength 5/5?bilateral extremities,?Romberg negative.  Assessment/Plan  Generalized weakness and dizziness  Concern for posterior stroke  -Patient described?vertigo,?abnormal neuro findings, but no focal deficits  -CTA revealed No significant arterial stenosis or occlusion in the head or neck, small right upper lobe nodule.? CT head was negative for acute intracranial hemorrhage  -Continue aspirin, atorvastatin,?Xarelto  -Consider MRI in a.m.?and possible neuro consult  -Consider echo in a.m.  ?  Paroxysmal A. fib  -Currently on Xarelto, will continue while inpatient  -Currently rate controlled with metoprolol?tartrate?25 mg twice daily and Cardizem,?continue?inpatient  ?  HTN  -Continue losartan 100 mg and amlodipine 10 mg  ?  HLD  -Continue atorvastatin 40  mg  ?  HFpEF?(EF?55%)  -Continue metoprolol,?Aldactone, losartan  ?  Access/Lines: PIV  Antibiotics: None  Diet: Cardiac  DVT Prophylaxis: Xarelto  GI Prophylaxis:?None  ?   Disposition:?Admitted for generalized weakness and dizziness with concern for?posterior stroke. ?CTA?was?negative for acute?arterial stenosis or occlusion.? Consider MRI and echo in a.m. for further work-up.   Problem List/Past Medical History  Ongoing  Atrial fibrillation  CAD - Coronary artery disease  Cardiac arrest with ventricular fibrillation  HTN (hypertension)  Hyperlipidemia  Hypertension  Knowledge deficit  MI - Myocardial infarction  Obesity  Historical  No qualifying data  Procedure/Surgical History  Colonoscopy (None) (12/27/2018)  Colonoscopy, flexible; with removal of tumor(s), polyp(s), or other lesion(s) by snare technique (12/27/2018)  Excision of Sigmoid Colon, Via Natural or Artificial Opening Endoscopic, Diagnostic (12/27/2018)  Polypectomy (None) (12/27/2018)  Insertion of Infusion Device into Superior Vena Cava, Percutaneous Approach (11/05/2018)  Diagnostic Laparoscopic (None) (11/02/2018)  Excision of Mesentery, Open Approach (11/02/2018)  Excision of Small Intestine, Open Approach (11/02/2018)  Exploration Laparotomy (None) (11/02/2018)  Inspection of Lower Intestinal Tract, Percutaneous Endoscopic Approach (11/02/2018)  Small Bowel Resection (None) (11/02/2018)  Insertion of Infusion Device into Upper Vein, Percutaneous Approach (11/01/2018)  Revision of Cardiac Lead in Heart, Percutaneous Approach (07/11/2018)  Insertion of Defibrillator Generator into Chest Subcutaneous Tissue and Fascia, Open Approach (06/18/2018)  Removal of Cardiac Rhythm Related Device from Trunk Subcutaneous Tissue and Fascia, Open Approach (06/18/2018)  Insertion of Defibrillator Generator into Chest Subcutaneous Tissue and Fascia, Open Approach (05/28/2018)  Insertion of Pacemaker Lead into Right Ventricle, Percutaneous Approach  (05/28/2018)  Removal of Cardiac Lead from Heart, Percutaneous Approach (05/28/2018)  Removal of Cardiac Rhythm Related Device from Trunk Subcutaneous Tissue and Fascia, Open Approach (05/28/2018)  Removal of Stimulator Generator from Trunk Subcutaneous Tissue and Fascia, Open Approach (05/28/2018)  Fluoroscopy of Left Heart using Low Osmolar Contrast (05/25/2018)  Fluoroscopy of Multiple Coronary Arteries using Low Osmolar Contrast (05/25/2018)  Measurement of Cardiac Sampling and Pressure, Left Heart, Percutaneous Approach (05/25/2018)  Cardiopulmonary resuscitation (eg, in cardiac arrest) (03/08/2018)  Fluoroscopy of Left Heart using Low Osmolar Contrast (03/08/2018)  Fluoroscopy of Multiple Coronary Arteries using Low Osmolar Contrast (03/08/2018)  Measurement of Cardiac Sampling and Pressure, Left Heart, Percutaneous Approach (03/08/2018)  Insertion of Pacemaker Lead into Right Atrium, Percutaneous Approach (11/13/2017)  Insertion of Pacemaker Lead into Right Ventricle, Percutaneous Approach (11/13/2017)  Insertion of Pacemaker, Dual Chamber into Chest Subcutaneous Tissue and Fascia, Open Approach (11/13/2017)  Fluoroscopy of Left Heart using Low Osmolar Contrast (11/10/2017)  Fluoroscopy of Multiple Coronary Arteries using Low Osmolar Contrast (11/10/2017)  Measurement of Cardiac Sampling and Pressure, Left Heart, Percutaneous Approach (11/10/2017)  Performance of Cardiac Pacing, Continuous (11/10/2017)  Angiogram  Pacemaker   Medications  Inpatient  acetaminophen, 650 mg= 2 tab(s), Oral, q6hr, PRN  acetaminophen, 1000 mg= 2 tab(s), Oral, q6hr, PRN  Aldactone 25 mg oral tablet, 25 mg= 1 tab(s), Oral, BID  amlodipine 10 mg oral tablet, 10 mg= 1 tab(s), Oral, Daily  aspirin 81 mg oral tablet, CHEWABLE, 81 mg= 1 tab(s), Oral, Daily  atorvastatin 20 mg oral tablet, 20 mg= 1 tab(s), Oral, Daily  Cardizem  mg/24 hours oral CAPsule, extended release, 120 mg= 1 cap(s), Oral, Daily  heparin, 7048 units= 1.41 mL,  80 unit/kg, IV Push, Once  Influenza Virus Vaccine, Inactivated, 0.5 mL, IM, Daily  losartan 50 mg oral tablet, 100 mg= 2 tab(s), Oral, Daily  Lovenox, 40 mg= 0.4 mL, Subcutaneous, Daily  metoprolol tartrate 25 mg oral tab, 25 mg= 1 tab(s), Oral, BID  Phenergan, 12.5 mg= 0.5 mL, IV Push, q4hr, PRN  Xarelto, 20 mg= 2 tab(s), Oral, Daily  Zofran, 4 mg= 2 mL, IV Push, q4hr, PRN  Home  Aldactone 25 mg oral tablet, 25 mg= 1 tab(s), Oral, BID, 3 refills  amlodipine 10 mg oral tablet, See Instructions, 6 refills  aspirin 81 mg oral tablet, CHEWABLE, 81 mg= 1 tab(s), Oral, Daily, 3 refills  atorvastatin 40 mg oral tablet, See Instructions, 6 refills  Cardizem  mg/24 hours oral TABlet, extended release, See Instructions, 6 refills  lisinopril 40 mg oral tablet, 40 mg= 1 tab(s), Oral, Daily  losartan 100 mg oral tablet, 100 mg= 1 tab(s), Oral, Daily, 3 refills  losartan 50 mg oral tablet, 50 mg= 1 tab(s), Oral, Daily  Metoprolol Tartrate 25 mg oral tablet, See Instructions, 6 refills  potassium chloride 20 mEq oral TABLET extended release, 20 mEq= 1 tab(s), Oral, Daily, 9 refills  Vitamin D 50,000 intl units oral capsule, 19090 IntUnit= 1 cap(s), Oral, qWeek  Xarelto 20mg Tablet, 20 mg= 1 tab(s), Oral, Daily, 6 refills  Allergies  No Known Allergies  Social History  Abuse/Neglect  No, No, Yes, 02/02/2020  Alcohol  Never, 09/23/2019  Employment/School  Employed, Activity level: Moderate physical work. Highest education level: High school., 03/24/2015  Home/Environment  Lives with lives with sister. Living situation: Home/Independent. Alcohol abuse in household: No. Substance abuse in household: No. Smoker in household: No. Injuries/Abuse/Neglect in household: No. Feels unsafe at home: No. Family/Friends available for support: Yes. Concern for family members at home: No. Major illness in household: No. Financial concerns: No. TV/Computer concerns: No., 03/24/2015  Nutrition/Health  Type of diet: cardiac. cardiac, Caffeine  intake amount: caffeine free mostly. Wants to lose weight: Yes. Sleeping concerns: No. Feels highly stressed: No., 01/16/2018  Sexual  Gender Identity Identifies as male., 02/28/2019  Substance Use  Never, 09/23/2019  Tobacco  Former smoker, quit more than 30 days ago, No, 02/02/2020  Former smoker, quit more than 30 days ago, N/A, 02/02/2020  Family History  Stroke: Mother and Father.  Immunizations  Vaccine Date Status Comments   pneumococcal 23-polyvalent vaccine 09/23/2019 Given    influenza virus vaccine, inactivated - Not Given Patient Refuses     tetanus/diphtheria/pertussis, acel(Tdap) 05/28/2016 Given other (see comment)   Lab Results  Labs Last 24 Hours?  ?Chemistry?   Magnesium Lvl: 2 mg/dL (02/02/20 09:37:00)   Phosphorus: 3.6 mg/dL (02/02/20 09:37:00)   T4 Free: 0.82 ng/dL (02/02/20 13:13:00)   TSH:?4.7 mIU/L?High (02/02/20 13:13:00)   Diagnostic Results  Accession:?[id number]  Order:?CT Angio Neck W W/O Contrast  Report Dt/Tm:?02/02/2020 14:30  Report:?  ?  History.  Unilateral weakness.  ?  Reference.  No priors  ?  Technique.  Helical acquisition through the head and neck without and with IV  contrast targeting the arterial phase. 3-D MIP reconstructions were  provided for review. DLP 2177 mGycm. Automatic exposure control,  adjustment of mA/kV or iterative reconstruction technique was used to  reduce radiation.  ?  Findings.  Two-vessel aortic arch. No significant common carotid narrowing. Mild  atherosclerotic disease along the carotid bulbs greater on the right.  No significant internal carotid narrowing. The cervical vertebral  arteries are widely patent.  ?  Minimal narrowing along the cavernous portion of the right internal  carotid artery. No significant intracranial stenosis or occlusion  otherwise. No aneurysm is seen.  ?  There is 4 mm right upper lobe nodule on image 18 series 5.  ?  Impression.  No significant arterial stenosis or occlusion in the head or neck.  ?  Small right upper  lobe nodule, recommend attention on cancer  surveillance scans.      I have performed a history and physical examination of the patient and discussed the management with the resident.  ???  [ x] I reviewed the residents note and agree with the documented findings and plan of care.  [ ] I reviewed the residents note and agree with the documented findings and plan of care except:  ?   See addendum from PN from today.? Agree with CVA workup however will also investigate pacemaker to glean meaningful insight if possible.  Orthostatic vitals normal, however near abnormal, holding amlodipine  ?   MD Andrew  ID Staff

## 2022-05-03 NOTE — HISTORICAL [facility] CERNER
This is a historical note converted from Evelina. Formatting and pictures may have been removed.  Please reference C[employer]mel for original formatting and attached multimedia. Admit and Discharge Dates  Admit Date: 02/04/2020  Discharge Date: 02/05/2020  Physicians  Attending Physician - Bria BUNCH, Manny Hawthorne  Admitting Physician - Bria BUNCH, Manny Hawthorne  Consulting Physician - Lalita BUNCH, Rico RODRIGUEZ  Consulting Physician - Kedar BUNCH, Serge PRATT  Primary Care Physician - Teresita BUNCH, Melvin  Discharge Diagnosis  ?  Generalized weakness and dizziness- improving  SVT episodes?on ICD interrogation  4mm Right upper lobe lung nodule  Paroxysmal A. fib - rate controlled and on anticoagulation  HFpEF  HTN  HLD  ?  Surgical Procedures  No procedures recorded for this visit.  Immunizations  02/05/2020 - influenza virus vaccine, inactivated?  Admission Information  ?  Mr. Daniel?is a 64-year-old?AAM with PMH hypertension, hyperlipidemia, CAD with history of an STEMI in 2003, paroxysmal A. fib on Eliquis, HFpEF of 55% as of 11/2018 and second-degree AV block?s/p pacemaker who presented to [facility] ED on 2/2?with complaints of dizziness?and weakness.? Patient states?he woke up this morning?and felt very weak and dizzy while trying to use the bathroom?but did not pass out. ?He had to hold onto something in order to steady himself.? He had to call the ambulance to bring him to the hospital.? Patient was initially discharged from the ED earlier this morning,?but?states when he went to the bathroom?he became?more weak and dizzy again.? He denied syncopal episode again.? He was able to ambulate without difficulty?and had no focal deficits.? EKG was performed which showed A. fib with?rate of 80.? Denies chest pain, shortness of breath,?diarrhea, constipation, recent illnesses, nausea/vomiting,?sick contacts, alcohol use.  ?   PMH: hypertension, hyperlipidemia, CAD with history of an STEMI in 2003, paroxysmal A. fib on Eliquis, HFpEF of 55% as of  11/2018 and second-degree AV block?s/p pacemaker  Surgical Hx:  Social Hx: Denies tobacco, alcohol, illicit drug use  ?  Hospital Course  ?  Patient was admitted for generalized weakness and dizziness. In total, pt had one episode before coming to the ED and another one in the ED. Work up was done to r/o?posterior stroke and to r/o possible mets from neuroendocrine tumor. CTA revealed No significant arterial stenosis or occlusion in the head or neck, small right upper lobe nodule.? CT head was negative for acute intracranial hemorrhage. Orthostatics?borderline ?(HR increase by 17bpm). We continued his home?aspirin, atorvastatin,?Xarelto. ECHO on 2/3/20 showed EF 55%. Mild concentric LVH, mild MR and TR, RVSP 28. MRI brain was unable to be done due to pacemaker. The Amlodipine was initially discontinued since pt is already on cardzem. ICD was interrogated, Pt was in Afib with RVR of  on Jan 23/20. And he had other 30 episodes of non-sustained SVTs. This might have been contributing to his new sx. But patient was asymptomatic since admission to the floor. Cardiology was consulted for afib management recommendation. They?recommended to?keep patient on home dose?Cardizem to 180 home dose; and to d/c amlodipine?and Aldactone, and to increase metoprolol to 25mg q6h. Continue Xarelto.  ?   Regarding the 4mm Right upper lobe nodule, patient was told to f/u with PCP for CT thorax as outpatient. 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 For his HFpEF and HTN, Continue metoprolol and losartan. Amlodipine was discontinued. For his HLD, continue atorvastatin 40 mg daily. Explained all changes to pt. Pt verbalized understanding. Patient was asymptomatic and stable for discharge.  ?   External referral placed to EP in [facility] for afib cardiac ablation  f/u with PCP in 1-2 weeks with labs- CBC, CMP, Mg, Phos  f/u CT thorax as outpatient when  following up with PCP to assess r possible mets/new primary mass  Keep Cardiology f/u on 5/12/20. If anything changes pt was advised to call cardiology and get an earlier appt.  ?  Significant Findings  as above  Time Spent on discharge  >45min  Objective  Vitals & Measurements  See?progress note?from day of discharge?  Physical Exam  See?progress note?from day of discharge?  Patient Discharge Condition  Patient is clinically and hemodynamically stable for discharge.?  Discharge Disposition  Discharge to home.?   Discharge Medication Reconciliation  Prescribed  metoprolol (metoprolol tartrate 25 mg oral tab)?25 mg, Oral, q6hr  Continue  aspirin (aspirin 81 mg oral tablet, CHEWABLE)?81 mg, Oral, Daily  atorvastatin (atorvastatin 40 mg oral tablet)?40 mg, Oral, Daily  diltiazem (Cardizem  mg/24 hours oral TABlet, extended release)?180 mg, Oral, Daily  losartan (losartan 100 mg oral tablet)?100 mg, Oral, Daily  rivaroxaban (Xarelto 20mg Tablet)?20 mg, Oral, Daily  rivaroxaban (Xarelto 20mg Tablet)?20 mg, Oral, Daily  Discontinue  amLODIPine (amlodipine 10 mg oral tablet)?See Instructions  ergocalciferol (Vitamin D 50,000 intl units oral capsule)?50,000 IntUnit, Oral, qWeek  lisinopril (lisinopril 40 mg oral tablet)?40 mg, Oral, Daily  losartan (losartan 50 mg oral tablet)?50 mg, Oral, Daily  metoprolol (Metoprolol Tartrate 25 mg oral tablet)?See Instructions  potassium chloride (potassium chloride 20 mEq oral TABLET extended release)?20 mEq, Oral, Daily  spironolactone (Aldactone 25 mg oral tablet)?25 mg, Oral, BID  Education and Orders Provided  Atrial Fibrillation, Easy-to-Read  Preventing Atrial Fibrillation-Related Stroke  Hypertension, Easy-to-Read  Coronary Artery Disease, Male  Pacemaker Follow-up (Custom)  Fatigue  Syncope, Easy-to-Read  Discharge - 02/05/20 13:00:00 CST, Home?  Follow up  Report to Emergency Department if symptoms return or worsen  Follow up with PCP in 1-2 weeks      I was present with  the Resident during discharge day management.  ???  [x ] I discussed the case with the Resident and agree with the discharge plan as above.  [ ] I discussed the case with the Resident and agree with the discharge plan as above except:  ???  Time spent on discharge [ 45] minutes

## 2022-05-03 NOTE — HISTORICAL [facility] CERNER
This is a historical note converted from [employer]. Formatting and pictures may have been removed.  Please reference C[employer]mel for original formatting and attached multimedia. Admit and Discharge Dates  Admit Date: 05/26/2019  Discharge Date: 05/29/2019  ?  Physicians  Attending Physician - Christopher BUNCH, Anjel LEUNG  Admitting Physician - Christopher BUNCH, Anjel LEUNG  Consulting Physician - Vignesh BUNCH, Lazarus ZAMUDIO  Primary Care Physician - Teresita BUNCH, Melvin  ?  Discharge Diagnosis  Abdominal pain?[id number]  SBO (small bowel obstruction)?K56.609,?Small bowel obstruction?K56.609  Surgical Procedures  No procedures recorded for this visit.  Immunizations  No immunizations recorded for this visit.  ?  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 Presented with one day history of worsening abdominal pain. CT scan demonstrated an early small bowel obstruction. He was treated conservatively with NG tube decompression and bowel rest. He had a small bowel follow through on hospital day 2 that demonstrated contrast in the colon at 15 minutes. The patients NG tube was discontinued. He tolerated a clear liquid diet. On HD 3 he was tolerating a full liquid diet. He was discharged home in improved condition with instructions to follow up in 2-4 weeks.  Significant Findings  Small bowel obstruction  Time Spent on discharge  20 min  Objective  Vitals & Measurements  T:?36.5? ?C (Oral)? TMIN:?36.5? ?C (Oral)? TMAX:?36.8? ?C (Oral)? HR:?70(Peripheral)? RR:?19? BP:?116/83? SpO2:?99%?  Physical Exam  General: alert, oriented, NAD  HEENT: NCAT, EOMI  Neck: Normal ROM  Resp: No increased WOB  CV: RR  Abd: Soft, mild distention, non-tender  MSK: normal ROM, no abnormalities  Skin: warm, dry  Psych: normal mood and affect  Patient Discharge Condition  improved  Discharge Disposition  home   Discharge Medication  Reconciliation  Discharge Med Rec is not complete  Education and Orders Provided  Discharge - 05/29/19 13:44:00 CDT, Home, Give all scheduled vaccinations prior to discharge.?  Discharge Activity - Activity as Tolerated?  Discharge Diet - Regular?  ?  Follow up  [facility], on 06/13/2019  ?

## 2022-05-03 NOTE — HISTORICAL [facility]
This is a historical note converted from [employer]. Formatting and pictures may have been removed.  Please reference [employer] for original formatting and attached multimedia. Admit and Discharge Dates  Admit Date: 07/02/2021  Discharge Date: 07/03/2021  Physicians  Attending Physician - Bria BUNCH, Manny Hawthorne  Admitting Physician - Bria BUNCH, Manny Hawthorne  Primary Care Physician - Teresita BUNCH, Melvin  Discharge Diagnosis  BRIAN on CKD II 2/2 Dehydration  A. fib on Xarelto? (chads vasc 6)  AV block s/p pacemaker placement in 2017  HFpEF 55% as of February 2020  Carcinoid tumor s/p resection  SBP s/p resection  Hx of MI in 2003? (no PCI)  HTN  Surgical Procedures  No procedures recorded for this visit.  Immunizations  No immunizations recorded for this visit.  Hospital Course  Patient is a 65-year-old -American male with history of A. fib on Xarelto , AV block s/p pacemaker placement in 2017, HFpEF 55% as of February 2020, CKD stage II, carcinoid tumor status post resection, small bowel obstruction status post resection, history of MI in 2003 without any stent placement, hypertension admitted to [facility] on 7/2 after coming in with complaints of fatigue and dehydration. ?Patient was standing outside at the bus stop when he felt very fatigued and tired and decided to call the ambulance. ?Per patient, he was very dehydrated and understandably so was not drinking enough fluids for the day. ?On admission creatinine was elevated at 2.17 with a GFR 40, usually his baseline creatinine is anywhere from 1.23-1.18 with baseline GFR in the 70s to 80s. ?Patient did not have rhabdomyolysis; not taking NSAIDs at home.??Patient received a liter bolus and was started on continuous infusion, had a net negative balance of -1 L approximately on discharge. ?Symptomatically felt improved. ?Provided another liter bolus of LR and gave a continuous infusion of 125 cc/h, creatinine significantly improved within a few hours of admission from 2.7 to 1.32  on discharge with a GFR recovering at 70. ?Likely this was prerenal given the fact how fast this recovered, patient was just dehydrated. ?Patient was restarted on Cardizem and home metoprolol succinate, but held losartan. ?Although patient hypertensive on discharge, this is likely because he was not provided losartan during this admission, okay to restart. ?Because GFR is above 50, okay to continue Aldactone at home.  ?  Time Spent on discharge  > 35 min  Objective  Vitals & Measurements  T:?36.2? ?C (Oral)? TMIN:?36.2? ?C (Oral)? TMAX:?36.8? ?C (Oral)? HR:?64(Peripheral)? RR:?16? BP:?159/92? SpO2:?99%?  Physical Exam  General:?well-developed well-nourished in no acute distress  HENT:?normocephalic, atraumatic  Respiratory:?clear to auscultation bilaterally  Cardiovascular:?regular rate and rhythm without murmurs, gallops or rubs; no peripheral edema  Gastrointestinal:?soft, non-tender, non-distended with normal bowel sounds, without masses to palpation  Musculoskeletal:?full range of motion of all extremities/spine without limitation or discomfort  Neurologic:? no signs of peripheral neurological deficit, motor/sensory function intact  ?  Patient Discharge Condition  clinically and hemodynamically stable   Discharge Medication Reconciliation  Continue  aspirin (aspirin 81 mg oral tablet, CHEWABLE)?30, Oral, Daily  atorvastatin (atorvastatin 40 mg oral tablet)?40 mg, Oral, Daily  diltiazem (Cardizem  mg/24 hours oral CAPsule, extended release)?120 mg, Oral, Daily  losartan (losartan 100 mg oral tablet)?100 mg, Oral, Daily  metoprolol (metoprolol succinate 50 mg oral tablet extended release)?75 mg, Oral, Daily  ondansetron (Zofran ODT 4 mg oral tablet, disintegrating)?4 mg, Oral, q12hr, PRN nausea/vomiting  potassium chloride (potassium chloride 20 mEq oral TABLET extended release)?20 mEq, Oral, BID  rivaroxaban (Xarelto 20mg Tablet)?20 mg, Oral, With Supper  spironolactone (spironolactone 50 mg oral tablet)?50  mg, Oral, Daily  ?  Education and Orders Provided  Chronic Kidney Disease, Adult, Easy-to-Read  Acute Kidney Injury, Adult  Discharge - 07/03/21 16:17:00 CDT, Home?  Follow up  Report to Emergency Department if symptoms return or worsen  follow up with PCP within 1-2 weeks of discharge  [employer]  No Qualifying Data

## 2022-05-10 DIAGNOSIS — E78.00 HIGH CHOLESTEROL: Primary | ICD-10-CM

## 2022-05-10 DIAGNOSIS — E66.9 OBESITY, UNSPECIFIED CLASSIFICATION, UNSPECIFIED OBESITY TYPE, UNSPECIFIED WHETHER SERIOUS COMORBIDITY PRESENT: Primary | ICD-10-CM

## 2022-05-10 RX ORDER — LOSARTAN POTASSIUM 50 MG/1
50 TABLET ORAL
COMMUNITY
Start: 2021-12-14 | End: 2022-05-10 | Stop reason: SDUPTHER

## 2022-05-10 RX ORDER — ATORVASTATIN CALCIUM 40 MG/1
40 TABLET, FILM COATED ORAL
COMMUNITY
Start: 2021-05-03 | End: 2022-05-10 | Stop reason: SDUPTHER

## 2022-05-12 RX ORDER — ATORVASTATIN CALCIUM 40 MG/1
40 TABLET, FILM COATED ORAL DAILY
Qty: 90 TABLET | Refills: 0 | Status: SHIPPED | OUTPATIENT
Start: 2022-05-12 | End: 2022-05-17 | Stop reason: SDUPTHER

## 2022-05-12 RX ORDER — LOSARTAN POTASSIUM 50 MG/1
50 TABLET ORAL DAILY
Qty: 90 TABLET | Refills: 0 | Status: SHIPPED | OUTPATIENT
Start: 2022-05-12 | End: 2022-05-17 | Stop reason: SDUPTHER

## 2022-05-17 ENCOUNTER — OFFICE VISIT (OUTPATIENT)
Dept: CARDIOLOGY | Facility: CLINIC | Age: 67
End: 2022-05-17
Payer: MEDICARE

## 2022-05-17 VITALS
WEIGHT: 239 LBS | RESPIRATION RATE: 20 BRPM | OXYGEN SATURATION: 100 % | DIASTOLIC BLOOD PRESSURE: 78 MMHG | HEIGHT: 69 IN | BODY MASS INDEX: 35.4 KG/M2 | SYSTOLIC BLOOD PRESSURE: 130 MMHG | HEART RATE: 73 BPM | TEMPERATURE: 98 F

## 2022-05-17 DIAGNOSIS — I48.0 PAROXYSMAL ATRIAL FIBRILLATION: Primary | ICD-10-CM

## 2022-05-17 DIAGNOSIS — I10 HYPERTENSION, UNSPECIFIED TYPE: ICD-10-CM

## 2022-05-17 DIAGNOSIS — Z95.0 CARDIAC PACEMAKER IN SITU: ICD-10-CM

## 2022-05-17 DIAGNOSIS — I25.10 CORONARY ARTERY DISEASE INVOLVING NATIVE HEART WITHOUT ANGINA PECTORIS, UNSPECIFIED VESSEL OR LESION TYPE: ICD-10-CM

## 2022-05-17 DIAGNOSIS — E78.00 HIGH CHOLESTEROL: ICD-10-CM

## 2022-05-17 DIAGNOSIS — Z95.810 ICD (IMPLANTABLE CARDIOVERTER-DEFIBRILLATOR) IN PLACE: ICD-10-CM

## 2022-05-17 DIAGNOSIS — Z86.79 H/O VENTRICULAR FIBRILLATION: ICD-10-CM

## 2022-05-17 PROCEDURE — 99213 OFFICE O/P EST LOW 20 MIN: CPT | Mod: PBBFAC,25

## 2022-05-17 PROCEDURE — 93283 PRGRMG EVAL IMPLANTABLE DFB: CPT | Mod: PBBFAC

## 2022-05-17 PROCEDURE — 99211 OFF/OP EST MAY X REQ PHY/QHP: CPT | Mod: PBBFAC,27

## 2022-05-17 RX ORDER — SPIRONOLACTONE 50 MG/1
50 TABLET, FILM COATED ORAL DAILY
Qty: 90 TABLET | Refills: 3 | Status: SHIPPED | OUTPATIENT
Start: 2022-05-17 | End: 2022-10-19 | Stop reason: SDUPTHER

## 2022-05-17 RX ORDER — METOPROLOL SUCCINATE 50 MG/1
50 TABLET, EXTENDED RELEASE ORAL 2 TIMES DAILY
Qty: 90 TABLET | Refills: 3 | Status: SHIPPED | OUTPATIENT
Start: 2022-05-17 | End: 2022-05-17 | Stop reason: SDUPTHER

## 2022-05-17 RX ORDER — METOPROLOL SUCCINATE 50 MG/1
50 TABLET, EXTENDED RELEASE ORAL 2 TIMES DAILY
COMMUNITY
Start: 2022-04-18 | End: 2022-05-17 | Stop reason: SDUPTHER

## 2022-05-17 RX ORDER — METOPROLOL SUCCINATE 50 MG/1
50 TABLET, EXTENDED RELEASE ORAL 2 TIMES DAILY
Qty: 180 TABLET | Refills: 3 | Status: SHIPPED | OUTPATIENT
Start: 2022-05-17 | End: 2022-10-05 | Stop reason: SDUPTHER

## 2022-05-17 RX ORDER — DILTIAZEM HYDROCHLORIDE 180 MG/1
180 CAPSULE, COATED, EXTENDED RELEASE ORAL DAILY
Qty: 90 CAPSULE | Refills: 3 | Status: SHIPPED | OUTPATIENT
Start: 2022-05-17 | End: 2022-10-19 | Stop reason: SDUPTHER

## 2022-05-17 RX ORDER — SPIRONOLACTONE 50 MG/1
50 TABLET, FILM COATED ORAL DAILY
COMMUNITY
Start: 2022-04-11 | End: 2022-05-17 | Stop reason: SDUPTHER

## 2022-05-17 RX ORDER — RIVAROXABAN 20 MG/1
TABLET, FILM COATED ORAL
COMMUNITY
Start: 2022-04-27 | End: 2022-05-17 | Stop reason: SDUPTHER

## 2022-05-17 RX ORDER — NAPROXEN SODIUM 220 MG/1
TABLET, FILM COATED ORAL
Qty: 90 TABLET | Refills: 3 | Status: SHIPPED | OUTPATIENT
Start: 2022-05-17 | End: 2022-10-19 | Stop reason: SDUPTHER

## 2022-05-17 RX ORDER — ATORVASTATIN CALCIUM 40 MG/1
40 TABLET, FILM COATED ORAL DAILY
Qty: 90 TABLET | Refills: 0 | Status: SHIPPED | OUTPATIENT
Start: 2022-05-17 | End: 2022-10-19 | Stop reason: SDUPTHER

## 2022-05-17 RX ORDER — DILTIAZEM HYDROCHLORIDE 120 MG/1
120 CAPSULE, COATED, EXTENDED RELEASE ORAL DAILY
COMMUNITY
Start: 2022-05-13 | End: 2022-05-17 | Stop reason: SDUPTHER

## 2022-05-17 RX ORDER — POTASSIUM CHLORIDE 1500 MG/1
20 TABLET, EXTENDED RELEASE ORAL 2 TIMES DAILY
COMMUNITY
Start: 2021-05-03 | End: 2022-05-17 | Stop reason: SDUPTHER

## 2022-05-17 RX ORDER — LOSARTAN POTASSIUM 50 MG/1
50 TABLET ORAL DAILY
Qty: 90 TABLET | Refills: 3 | Status: SHIPPED | OUTPATIENT
Start: 2022-05-17 | End: 2022-10-19 | Stop reason: SDUPTHER

## 2022-05-17 RX ORDER — POTASSIUM CHLORIDE 1500 MG/1
20 TABLET, EXTENDED RELEASE ORAL 2 TIMES DAILY
Qty: 90 TABLET | Refills: 3 | Status: SHIPPED | OUTPATIENT
Start: 2022-05-17 | End: 2023-08-09 | Stop reason: DRUGHIGH

## 2022-05-17 RX ORDER — RIVAROXABAN 20 MG/1
TABLET, FILM COATED ORAL
Qty: 90 TABLET | Refills: 3 | Status: SHIPPED | OUTPATIENT
Start: 2022-05-17 | End: 2022-10-19 | Stop reason: SDUPTHER

## 2022-05-17 RX ORDER — CHOLECALCIFEROL (VITAMIN D3) 25 MCG
25 TABLET ORAL
COMMUNITY
Start: 2021-11-16 | End: 2022-10-19 | Stop reason: SDUPTHER

## 2022-05-17 RX ORDER — NAPROXEN SODIUM 220 MG/1
TABLET, FILM COATED ORAL
COMMUNITY
Start: 2022-05-13 | End: 2022-05-17 | Stop reason: SDUPTHER

## 2022-05-17 RX ORDER — POTASSIUM CHLORIDE 1500 MG/1
20 TABLET, EXTENDED RELEASE ORAL 2 TIMES DAILY
Qty: 90 TABLET | Refills: 3 | Status: SHIPPED | OUTPATIENT
Start: 2022-05-17 | End: 2022-05-17 | Stop reason: SDUPTHER

## 2022-05-17 NOTE — PATIENT INSTRUCTIONS
Plan/Recommendations:      1. CAD   2. pAFib on Xarelto  3. HFpEF  4. 2nd degree AVB s/p PPM-->upgrade to ICD for secondary prevention 2/2 VFib arrest  Plan:  -Increase Diltiazem 120 qd-->[id number] mg qd  -Continue Toprol XL 50 mg BID  -Continue losartan 50 mg qd  -Continue asa/statin  -Continue spironolactone  -Continue Xarelto  -RTC 3 monthhs

## 2022-05-17 NOTE — PROGRESS NOTES
[facility] Cardiology Clinic Note     Cardiology Attending: Dr. Villegas  Cardiology Fellow: Alfie Medina MD       History of Present Illness:      Delio Daniel Jr. is a 66 y.o. male with a PMH significant for HTN, HLD, small carcinoid tumor s/p resection, CAD w hx of STEMI in 2003, paroxysmal Afib on xarelto, HFpEF (55%) and second degree AV block s/ PPM ([facility])11/13/17 (upgraded to dcICD 5/18 2/2 Vfib arrest in 3/18 in setting of prolonged QT and hypoK) presents for a routine follow up. Pt voiced no complaints and is compliant with his meds without side effects. Denies cp, sob, palpitations, presyncope/dizziness, syncope, orthopnea, PND, bendopnea, decrease ET, NVDC, fever, chills.    Lexiscan 10/30/20- negative  Echo 2/2020 EF 55%, LVH, mild MR/TR, RSVP 28 mmHg  Device interrogation 4/21 revealed 32 NSVT, normal device function  Device interrogation 5/17/22: 32 NSVT-->Afib RVR; V rates 180-200 bpm. Asymptomatic. Normal functioning    Past Medical History:     Past Medical History:   Diagnosis Date    Atrial fibrillation     BPH (benign prostatic hyperplasia)     Cardiac arrest     Coronary artery disease     Cyst, kidney, acquired     Diverticulosis     Hyperlipidemia     Hypertension     MI (myocardial infarction)     Obesity     Steatosis of liver      Surgical History:     Past Surgical History:   Procedure Laterality Date    CARDIAC CATHETERIZATION      COLONOSCOPY W/ BIOPSIES      excision of colon       Allergies:   Review of patient's allergies indicates:  No Known Allergies  Family History:     Family History   Problem Relation Age of Onset    Hypertension Mother     Hypertension Father     Hypertension Sister      Social History:     Social History     Tobacco Use    Smoking status: Former Smoker    Smokeless tobacco: Never Used   Substance Use Topics    Alcohol use: Not Currently    Drug use: Not Currently     Review of Systems:     ROS   Per [facility]  Medications:     Home  "Medications:  Prior to Admission medications    Medication Sig Start Date End Date Taking? Authorizing Provider   aspirin 81 MG Chew chew and swallow 1 tablet by mouth daily 5/13/22  Yes Historical Provider   atorvastatin (LIPITOR) 40 MG tablet Take 1 tablet (40 mg total) by mouth once daily. 5/12/22  Yes Magdi Rivera, DO   diltiaZEM (CARDIZEM CD) 120 MG Cp24 Take 120 mg by mouth once daily. 5/13/22  Yes Historical Provider   losartan (COZAAR) 50 MG tablet Take 1 tablet (50 mg total) by mouth once daily. 5/12/22  Yes Magdi Rivera, DO   metoprolol succinate (TOPROL-XL) 50 MG 24 hr tablet Take 50 mg by mouth 2 (two) times daily. 4/18/22  Yes Historical Provider   potassium chloride (K-TAB) 20 mEq Take 20 mEq by mouth 2 (two) times a day. 5/3/21  Yes Historical Provider   spironolactone (ALDACTONE) 50 MG tablet Take 50 mg by mouth once daily. 4/11/22  Yes Historical Provider   vitamin D (VITAMIN D3) 1000 units Tab Take 25 mcg by mouth. 11/16/21  Yes Historical Provider   XARELTO 20 mg Tab TAKE 1 TABLET BY MOUTH DAILY with SUPPER 4/27/22  Yes Historical Provider       Current/Inpatient Medications:  Infusions:    Scheduled:    PRN:      Objective:         Vitals: BP (!) 146/99 (BP Location: Right arm)   Pulse 73   Temp 97.7 °F (36.5 °C)   Resp 20   Ht 5' 8.5" (1.74 m)   Wt 108.4 kg (238 lb 15.7 oz)   SpO2 100%   BMI 35.80 kg/m²   [unfilled]    Physical Exam   General: Well nourished   HEENT: NC/AT; PERRL; nasal and oral mucosa moist and clear  Neck: Full ROM; no lymphadenopathy  Pulm: Diminished lower lobes bilaterally, normal work of breathing  CV: S1, S2 w/ systolic murmurs or gallops; trace edema BLE noted;  GI: Soft with normal bowel sounds in all quadrants, no masses on palpation  MSK: Full ROM of all extremities and spine w/o limitation or discomfort  Derm: No rashes, abnormal bruising, or skin lesions  Neuro: AAOx4; motor/sensory function intact  Psych: Cooperative; appropriate mood and " affect  Cardiovascular Studies/Imaging:   I have reviewed the following studies below:    TTE    2/3/20  Mild LVH  EF 55%  Mild MR  RSVP 28 mmHg        Assessment:     Delio Daniel Jr. is a 66 y.o. male with a PMH significant for HTN, HLD, small carcinoid tumor s/p resection, CAD w hx of STEMI in 2003, paroxysmal Afib on xarelto, HFpEF (55%) and second degree AV block s/p PPM ([facility])11/13/17 (upgraded to dcICD 5/18 2/2 Vfib arrest in 3/18 in setting of prolonged QT and hypoK) presents for a routine follow up.    Plan/Recommendations:     1. CAD   2. pAFib on Xarelto  3. HFpEF  4. 2nd degree AVB s/p PPM-->upgrade to ICD for secondary prevention 2/2 VFib arrest  Plan:  -Increase Diltiazem 120 qd-->[id number] mg qd  -Continue Toprol XL 50 mg BID  -Continue losartan 50 mg qd  -Continue asa/statin  -Continue spironolactone  -Continue Xarelto  -RTC 3 monthhs      Alfie Mar  [facility] Cardiology Fellow, PGY-6  05/17/2022 9:38 AM  [facility]

## 2022-05-19 NOTE — PROGRESS NOTES
Cardiology attending addendum  Patient's case discussed with Dr.Jose Zaid Medina. Agree with plan as outlined above. Pt with episodes of Afib with RVR on device interrogation today. Will increase dilt to 180mg daily. RTC in 3 months.    Cyndie Villegas MD  Cardiology-[facility]

## 2022-05-21 ENCOUNTER — HOSPITAL ENCOUNTER (EMERGENCY)
Facility: HOSPITAL | Age: 67
Discharge: HOME | End: 2022-05-21
Attending: INTERNAL MEDICINE
Payer: MEDICARE

## 2022-05-21 VITALS
WEIGHT: 220.44 LBS | DIASTOLIC BLOOD PRESSURE: 97 MMHG | TEMPERATURE: 98 F | OXYGEN SATURATION: 98 % | HEART RATE: 65 BPM | BODY MASS INDEX: 32.65 KG/M2 | RESPIRATION RATE: 18 BRPM | SYSTOLIC BLOOD PRESSURE: 167 MMHG | HEIGHT: 69 IN

## 2022-05-21 DIAGNOSIS — M25.551 RIGHT HIP PAIN: Primary | ICD-10-CM

## 2022-05-21 LAB
ALBUMIN SERPL-MCNC: 3.9 GM/DL (ref 3.4–4.8)
ALBUMIN/GLOB SERPL: 1.2 RATIO (ref 1.1–2)
ALP SERPL-CCNC: 60 UNIT/L (ref 40–150)
ALT SERPL-CCNC: 18 UNIT/L (ref 0–55)
APPEARANCE UR: CLEAR
AST SERPL-CCNC: 21 UNIT/L (ref 5–34)
BACTERIA #/AREA URNS AUTO: ABNORMAL /HPF
BASOPHILS # BLD AUTO: 0.07 X10(3)/MCL (ref 0–0.2)
BASOPHILS NFR BLD AUTO: 1.1 %
BILIRUB UR QL STRIP.AUTO: NEGATIVE MG/DL
BILIRUBIN DIRECT+TOT PNL SERPL-MCNC: 1.6 MG/DL
BUN SERPL-MCNC: 10.2 MG/DL (ref 8.4–25.7)
CALCIUM SERPL-MCNC: 9.1 MG/DL (ref 8.8–10)
CHLORIDE SERPL-SCNC: 108 MMOL/L (ref 98–107)
CO2 SERPL-SCNC: 22 MMOL/L (ref 23–31)
COLOR UR AUTO: YELLOW
CREAT SERPL-MCNC: 1.07 MG/DL (ref 0.73–1.18)
EOSINOPHIL # BLD AUTO: 0.09 X10(3)/MCL (ref 0–0.9)
EOSINOPHIL NFR BLD AUTO: 1.4 %
ERYTHROCYTE [DISTWIDTH] IN BLOOD BY AUTOMATED COUNT: 13.4 % (ref 11.5–17)
GLOBULIN SER-MCNC: 3.3 GM/DL (ref 2.4–3.5)
GLUCOSE SERPL-MCNC: 107 MG/DL (ref 82–115)
GLUCOSE UR QL STRIP.AUTO: NORMAL MG/DL
HCT VFR BLD AUTO: 42.9 % (ref 42–52)
HGB BLD-MCNC: 14.4 GM/DL (ref 14–18)
HYALINE CASTS #/AREA URNS LPF: ABNORMAL /LPF
IMM GRANULOCYTES # BLD AUTO: 0.03 X10(3)/MCL (ref 0–0.02)
IMM GRANULOCYTES NFR BLD AUTO: 0.5 % (ref 0–0.43)
KETONES UR QL STRIP.AUTO: NEGATIVE MG/DL
LEUKOCYTE ESTERASE UR QL STRIP.AUTO: NEGATIVE UNIT/L
LYMPHOCYTES # BLD AUTO: 2.18 X10(3)/MCL (ref 0.6–4.6)
LYMPHOCYTES NFR BLD AUTO: 33.5 %
MCH RBC QN AUTO: 29.6 PG (ref 27–31)
MCHC RBC AUTO-ENTMCNC: 33.6 MG/DL (ref 33–36)
MCV RBC AUTO: 88.3 FL (ref 80–94)
MONOCYTES # BLD AUTO: 0.67 X10(3)/MCL (ref 0.1–1.3)
MONOCYTES NFR BLD AUTO: 10.3 %
MUCOUS THREADS URNS QL MICRO: ABNORMAL /LPF
NEUTROPHILS # BLD AUTO: 3.5 X10(3)/MCL (ref 2.1–9.2)
NEUTROPHILS NFR BLD AUTO: 53.2 %
NITRITE UR QL STRIP.AUTO: NEGATIVE
NRBC BLD AUTO-RTO: 0 %
PH UR STRIP.AUTO: 6 [PH]
PLATELET # BLD AUTO: 238 X10(3)/MCL (ref 130–400)
PMV BLD AUTO: 10.3 FL (ref 9.4–12.4)
POTASSIUM SERPL-SCNC: 3.3 MMOL/L (ref 3.5–5.1)
PROT SERPL-MCNC: 7.2 GM/DL (ref 5.8–7.6)
PROT UR QL STRIP.AUTO: ABNORMAL MG/DL
RBC # BLD AUTO: 4.86 X10(6)/MCL (ref 4.7–6.1)
RBC #/AREA URNS AUTO: ABNORMAL /HPF
RBC UR QL AUTO: ABNORMAL UNIT/L
SODIUM SERPL-SCNC: 141 MMOL/L (ref 136–145)
SP GR UR STRIP.AUTO: 1.02
SQUAMOUS #/AREA URNS LPF: ABNORMAL /HPF
UROBILINOGEN UR STRIP-ACNC: NORMAL MG/DL
WBC # SPEC AUTO: 6.5 X10(3)/MCL (ref 4.5–11.5)
WBC #/AREA URNS AUTO: ABNORMAL /HPF

## 2022-05-21 PROCEDURE — 99284 EMERGENCY DEPT VISIT MOD MDM: CPT | Mod: 25

## 2022-05-21 PROCEDURE — 81001 URINALYSIS AUTO W/SCOPE: CPT | Performed by: PHYSICIAN ASSISTANT

## 2022-05-21 PROCEDURE — 85025 COMPLETE CBC W/AUTO DIFF WBC: CPT | Performed by: PHYSICIAN ASSISTANT

## 2022-05-21 PROCEDURE — 25000003 PHARM REV CODE 250: Performed by: PHYSICIAN ASSISTANT

## 2022-05-21 PROCEDURE — 80053 COMPREHEN METABOLIC PANEL: CPT | Performed by: PHYSICIAN ASSISTANT

## 2022-05-21 PROCEDURE — 36415 COLL VENOUS BLD VENIPUNCTURE: CPT | Performed by: PHYSICIAN ASSISTANT

## 2022-05-21 RX ORDER — ACETAMINOPHEN AND CODEINE PHOSPHATE 300; 30 MG/1; MG/1
1 TABLET ORAL EVERY 6 HOURS PRN
Qty: 12 TABLET | Refills: 0 | OUTPATIENT
Start: 2022-05-21 | End: 2022-06-26

## 2022-05-21 RX ORDER — HYDROCODONE BITARTRATE AND ACETAMINOPHEN 7.5; 325 MG/1; MG/1
1 TABLET ORAL ONCE
Status: COMPLETED | OUTPATIENT
Start: 2022-05-21 | End: 2022-05-21

## 2022-05-21 RX ADMIN — HYDROCODONE BITARTRATE AND ACETAMINOPHEN 1 TABLET: 7.5; 325 TABLET ORAL at 04:05

## 2022-05-21 NOTE — ED PROVIDER NOTES
Name: Delio Daniel Jr.   Age: 66 y.o.  Sex: male    Chief complaint:   Chief Complaint   Patient presents with    Leg Pain     C/o right upper leg pain since this am. Denies any injury      Patient arrived with: EMS  History obtained from: Patient    Subjective:   Patient with pmhx of HTN, HLD, CAD, HFpEF, Afib on xarelto presents today c/o right groin pain since this morning. Pain is worse with movement. No relieving factors. He is able to bear weight. Walks with cane at baseline. Denies injury/trauma, heavy lifting, bulge in the area, testicular pain, abdominal pain, n/v/d, constipation, dysuria, hematuria, weakness, chest pain, sob.       Past Medical History:   Diagnosis Date    Atrial fibrillation     BPH (benign prostatic hyperplasia)     Cardiac arrest     Coronary artery disease     Cyst, kidney, acquired     Diverticulosis     Hyperlipidemia     Hypertension     MI (myocardial infarction)     Obesity     Steatosis of liver      Past Surgical History:   Procedure Laterality Date    CARDIAC CATHETERIZATION      COLONOSCOPY W/ BIOPSIES      excision of colon       Social History     Socioeconomic History    Marital status: Single   Tobacco Use    Smoking status: Former Smoker    Smokeless tobacco: Never Used   Substance and Sexual Activity    Alcohol use: Not Currently    Drug use: Not Currently     Review of patient's allergies indicates:  No Known Allergies     Review of Systems   Constitutional: Negative for chills and fever.   HENT: Negative for sore throat.    Respiratory: Negative for shortness of breath.    Cardiovascular: Negative for chest pain and leg swelling.   Gastrointestinal: Negative for abdominal pain, constipation, diarrhea, nausea and vomiting.   Genitourinary: Negative for dysuria, flank pain and hematuria.   Musculoskeletal: Positive for myalgias.   Neurological: Negative for headaches.          Objective:     Initial Vitals [05/21/22 1515]   BP Pulse Resp Temp SpO2    (!) 164/107 (!) 55 20 97.5 °F (36.4 °C) 97 %      MAP       --            Physical Exam  Vitals reviewed. Exam conducted with a chaperone present.   Constitutional:       General: He is not in acute distress.     Appearance: Normal appearance. He is obese. He is not ill-appearing, toxic-appearing or diaphoretic.   HENT:      Head: Normocephalic and atraumatic.      Mouth/Throat:      Mouth: Mucous membranes are moist.   Eyes:      Extraocular Movements: Extraocular movements intact.      Conjunctiva/sclera: Conjunctivae normal.   Cardiovascular:      Rate and Rhythm: Normal rate and regular rhythm.      Pulses: Normal pulses.      Heart sounds: Normal heart sounds.   Pulmonary:      Effort: Pulmonary effort is normal.      Breath sounds: Normal breath sounds.   Abdominal:      General: Abdomen is flat. Bowel sounds are normal. There is no distension.      Palpations: Abdomen is soft. There is no mass.      Tenderness: There is no abdominal tenderness. There is no guarding or rebound.      Comments: No hernia defect appreciated in the right groin.    Musculoskeletal:         General: No swelling, tenderness, deformity or signs of injury. Normal range of motion.      Cervical back: Neck supple.      Right lower leg: No edema.      Left lower leg: No edema.   Skin:     General: Skin is warm and dry.      Capillary Refill: Capillary refill takes less than 2 seconds.      Coloration: Skin is not jaundiced.   Neurological:      General: No focal deficit present.      Mental Status: He is alert and oriented to person, place, and time. Mental status is at baseline.   Psychiatric:         Mood and Affect: Mood normal.          Records:  Nursing records and triage records reviewed  Prior records reviewed    Labs:  Recent Results (from the past 24 hour(s))   Comprehensive Metabolic Panel    Collection Time: 05/21/22  3:59 PM   Result Value Ref Range    Sodium Level 141 136 - 145 mmol/L    Potassium Level 3.3 (L) 3.5 - 5.1  mmol/L    Chloride 108 (H) 98 - 107 mmol/L    Carbon Dioxide 22 (L) 23 - 31 mmol/L    Glucose Level 107 82 - 115 mg/dL    Blood Urea Nitrogen 10.2 8.4 - 25.7 mg/dL    Creatinine 1.07 0.73 - 1.18 mg/dL    Calcium Level Total 9.1 8.8 - 10.0 mg/dL    Protein Total 7.2 5.8 - 7.6 gm/dL    Albumin Level 3.9 3.4 - 4.8 gm/dL    Globulin 3.3 2.4 - 3.5 gm/dL    Albumin/Globulin Ratio 1.2 1.1 - 2.0 ratio    Bilirubin Total 1.6 (H) <=1.5 mg/dL    Alkaline Phosphatase 60 40 - 150 unit/L    Alanine Aminotransferase 18 0 - 55 unit/L    Aspartate Aminotransferase 21 5 - 34 unit/L    Estimated GFR- >60 mls/min/1.73/m2   CBC with Differential    Collection Time: 05/21/22  4:02 PM   Result Value Ref Range    WBC 6.5 4.5 - 11.5 x10(3)/mcL    RBC 4.86 4.70 - 6.10 x10(6)/mcL    Hgb 14.4 14.0 - 18.0 gm/dL    Hct 42.9 42.0 - 52.0 %    MCV 88.3 80.0 - 94.0 fL    MCH 29.6 27.0 - 31.0 pg    MCHC 33.6 33.0 - 36.0 mg/dL    RDW 13.4 11.5 - 17.0 %    Platelet 238 130 - 400 x10(3)/mcL    MPV 10.3 9.4 - 12.4 fL    Neut % 53.2 %    Lymph % 33.5 %    Mono % 10.3 %    Eos % 1.4 %    Basophil % 1.1 %    Lymph # 2.18 0.6 - 4.6 x10(3)/mcL    Neut # 3.5 2.1 - 9.2 x10(3)/mcL    Mono # 0.67 0.1 - 1.3 x10(3)/mcL    Eos # 0.09 0 - 0.9 x10(3)/mcL    Baso # 0.07 0 - 0.2 x10(3)/mcL    IG# 0.03 (H) 0 - 0.0155 x10(3)/mcL    IG% 0.5 (H) 0 - 0.43 %    NRBC% 0.0 %   Urinalysis, Reflex to Urine Culture Urine, Clean Catch    Collection Time: 05/21/22  4:38 PM    Specimen: Urine   Result Value Ref Range    Color, UA Yellow Yellow, Colorless, Other, Clear    Appearance, UA Clear Clear    Specific Gravity, UA 1.023     pH, UA 6.0 5.0, 5.5, 6.0, 6.5, 7.0, 7.5, 8.0, 8.5    Protein, UA Trace (A) Negative, 300  mg/dL    Glucose, UA Normal Negative, Normal mg/dL    Ketones, UA Negative Negative, +1, +2, +3, +4, +5, >=160 mg/dL    Blood, UA 1+ (A) Negative unit/L    Bilirubin, UA Negative Negative mg/dL    Urobilinogen, UA Normal 0.2, 1.0, Normal mg/dL     Nitrites, UA Negative Negative    Leukocyte Esterase, UA Negative Negative, 75  unit/L    WBC, UA None Seen None Seen, 0-2, 3-5, 0-5 /HPF    Bacteria, UA None Seen None Seen /HPF    Squamous Epithelial Cells, UA None Seen None Seen /HPF    Mucous, UA Few (A) None Seen /LPF    Hyaline Casts, UA 3-5 (A) None Seen /lpf    RBC, UA 0-5 None Seen, 0-2, 3-5, 0-5 /HPF        Images:    Imaging Results          X-Ray Hip 2 or 3 views Right (with Pelvis when performed) (Preliminary result)  Result time 05/21/22 18:55:49    ED Interpretation by KAVIN Dunn (05/21/22 18:55:49, [facility] Dept, Emergency Medicine)    Degenerative changes                                  Medications:  Medications   HYDROcodone-acetaminophen 7.5-325 mg per tablet 1 tablet (1 tablet Oral Given 5/21/22 1615)          Medical decision making:             Procedures       Diagnosis:  Final diagnoses:  [M25.551] Right hip pain (Primary)     Delio Daniel Jr. discharge to home/self care.    - Condition at discharge: Stable  - Mode of Discharge: by walking out   - The discharge instructions were discussed with the patient/parent.  - They state an understanding of the discharge instructions.  - Advised to f/u with pcp within 1-2 days. ED precautions given.     ED Prescriptions     Medication Sig Dispense Start Date End Date Auth. Provider    acetaminophen-codeine 300-30mg (TYLENOL #3) 300-30 mg Tab Take 1 tablet by mouth every 6 (six) hours as needed (pain). 12 tablet 5/21/2022  KAVIN Dunn          Follow-up Information     Follow up With Specialties Details Why Contact Info    [facility] Emergency Dept Emergency Medicine  If symptoms worsen return to ED immediately [address] [address] [phone]    Primary Care Provider within 1-2 days  Go in 1 day              (Please note that this chart was completed via voice to text dictation. There may be typographical errors  or substitutions that are unintentional, or uncorrected. Every attempt was made to proofread the chart prior to completion. If there are any questions, please contact the provider for final clarification).       KAVIN Dunn  05/21/22 [id number]

## 2022-05-25 ENCOUNTER — OFFICE VISIT (OUTPATIENT)
Dept: HEMATOLOGY/ONCOLOGY | Facility: CLINIC | Age: 67
End: 2022-05-25
Payer: MEDICARE

## 2022-05-25 VITALS
HEIGHT: 69 IN | TEMPERATURE: 98 F | HEART RATE: 85 BPM | RESPIRATION RATE: 18 BRPM | OXYGEN SATURATION: 100 % | WEIGHT: 240.63 LBS | DIASTOLIC BLOOD PRESSURE: 90 MMHG | BODY MASS INDEX: 35.64 KG/M2 | SYSTOLIC BLOOD PRESSURE: 126 MMHG

## 2022-05-25 DIAGNOSIS — Z08 ENCOUNTER FOR FOLLOW-UP SURVEILLANCE OF NEUROENDOCRINE CARCINOMA: ICD-10-CM

## 2022-05-25 DIAGNOSIS — C7A.8 PRIMARY NEUROENDOCRINE CARCINOMA OF COLON: Primary | ICD-10-CM

## 2022-05-25 DIAGNOSIS — Z85.89 ENCOUNTER FOR FOLLOW-UP SURVEILLANCE OF NEUROENDOCRINE CARCINOMA: ICD-10-CM

## 2022-05-25 DIAGNOSIS — E87.6 HYPOKALEMIA: ICD-10-CM

## 2022-05-25 PROCEDURE — 80053 COMPREHEN METABOLIC PANEL: CPT | Performed by: NURSE PRACTITIONER

## 2022-05-25 PROCEDURE — 99214 OFFICE O/P EST MOD 30 MIN: CPT | Mod: S$PBB,,, | Performed by: NURSE PRACTITIONER

## 2022-05-25 PROCEDURE — 85025 COMPLETE CBC W/AUTO DIFF WBC: CPT | Performed by: NURSE PRACTITIONER

## 2022-05-25 PROCEDURE — 99214 PR OFFICE/OUTPT VISIT, EST, LEVL IV, 30-39 MIN: ICD-10-PCS | Mod: S$PBB,,, | Performed by: NURSE PRACTITIONER

## 2022-05-25 PROCEDURE — 99213 OFFICE O/P EST LOW 20 MIN: CPT | Mod: PBBFAC | Performed by: NURSE PRACTITIONER

## 2022-05-25 NOTE — PROGRESS NOTES
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 Stable energy level and appetite. Denies any significant symptoms. Labs relatively stable; noted mild hypokalemia.     Physical Exam   Vitals:    05/25/22 0841   BP: (!) 126/90   Pulse: 85   Resp: 18   Temp: 97.7 °F (36.5 °C)     General: Alert and oriented, No acute distress.   Appearance: Well-groomed  HEENT: Normocephalic, Oral mucosa is moist. Pupils are equal, round and reactive to light, Extraocular movements are intact, Normal conjunctiva.   Neck: Supple, Non-tender, No lymphadenopathy, No thyromegaly.   Respiratory: Lungs are clear to auscultation, Respirations are non-labored, Breath sounds are equal, Symmetrical chest wall expansion.   Cardiovascular: Normal rate, Regular rhythm, No edema.   Gastrointestinal: Rounded, Soft, Non-tender, Non-distended, Present bowel sounds.   Musculoskeletal: Normal strength. Ambulates without assistance  Integumentary: Warm, dry, intact.   Neurologic: Alert, Oriented, No focal deficits, Cranial Nerves II-XII are grossly intact.   Cognition and Speech: Oriented, Speech clear and coherent.   Psychiatric: Cooperative, Appropriate mood & affect.   ECOG Performance Scale: 2 - Capable of all self-care but unable to carry out any work activities. Up and about greater than 50 percent of waking hours.     Lab Results   Component Value Date    WBC 7.1 05/25/2022    RBC 4.85 05/25/2022    HGB 14.4 05/25/2022    HCT 43.1 05/25/2022    MCV 88.9 05/25/2022    MCH 29.7 05/25/2022    MCHC 33.4 05/25/2022    RDW 13.8 05/25/2022     05/25/2022    MPV 10.7 05/25/2022     CMP  Sodium Level   Date Value Ref Range Status    05/25/2022 139 136 - 145 mmol/L Final     Potassium Level   Date Value Ref Range Status   05/25/2022 3.4 (L) 3.5 - 5.1 mmol/L Final     Carbon Dioxide   Date Value Ref Range Status   05/25/2022 23 23 - 31 mmol/L Final     Blood Urea Nitrogen   Date Value Ref Range Status   05/25/2022 18.0 8.4 - 25.7 mg/dL Final     Creatinine   Date Value Ref Range Status   05/25/2022 1.16 0.73 - 1.18 mg/dL Final     Calcium Level Total   Date Value Ref Range Status   05/25/2022 9.2 8.8 - 10.0 mg/dL Final     Albumin Level   Date Value Ref Range Status   05/25/2022 4.0 3.4 - 4.8 gm/dL Final     Bilirubin Total   Date Value Ref Range Status   05/25/2022 1.5 <=1.5 mg/dL Final     Alkaline Phosphatase   Date Value Ref Range Status   05/25/2022 61 40 - 150 unit/L Final     Aspartate Aminotransferase   Date Value Ref Range Status   05/25/2022 22 5 - 34 unit/L Final     Alanine Aminotransferase   Date Value Ref Range Status   05/25/2022 21 0 - 55 unit/L Final     Estimated GFR-Non    Date Value Ref Range Status   04/23/2022 61 >=90        Assessment/Plan   Neuroendocrine carcinoma of colon   pT3,4 pN2 MX, at least stage III, well-differentiated neuroendocrine tumor of small bowel, multifocal, grade 1; large mesenteric mass (3.8 cm); 2:21 lymph nodes involved; small multifocal areas of tumor in the efren-intestinal adipose tissue.  Presented with small bowel obstruction. Status post resection of 127 cm of small bowel and mesenteric mass in the base of the mesentery, on 11/02/2018.  -Colonoscopy (12/27/2018: 3 mm hyperplastic sigmoid polyp  -No somatostatin receptor avid disease on whole-body gallium dotatate PET/CT (02/15/2019)  -No evidence of neuroendocrine tumor on octreotide scan (06/28/2019)  -No evidence of disease (surveillance CTs C/A/P 11/01/2019)  CTs 11/02/2020 show no convincing evidence of recurrent or metastatic disease, however newly noted mildly enlarged retroperitoneum and mediastinum lymphadenopathy which  are nonspecific but stable.  CT CAP 5/3/2021 decrease in size of mediastinal LNs; no new findings concerning for recurrence or metastatic disease   CT A/P 1/7/22 - Stable appearing mildly enlarged retroperitoneal LN and periaortic LN; unrevealing for overt evidence of recurrence or metastatic disease     History of coronary artery disease, atrial fibrillation, history of cardiac arrest with ventricular fibrillation, MI in 2003, history of previous DVTs, status post CPR 03/08/2018, on anticoagulation for history of DVTs and atrial fibrillation, history of second-degree AV block requiring PPM.    Plan:  Surveillance:  Underwent resection on 11/02/2018  1. 3-12 months post resection (until 11/2019):  -History and physical  -Consider biochemical markers as clinically indicated  -Abdominal with or without pelvic multiphasic CT or MRI with IV contrast, as clinically indicated  -Chest CT with and without contrast for primary lung/thymus tumors (as clinically indicated for primary GI tumors)    2. >1-year post resection to 10 years:  Every 12-24 months:  -History and physical  -Consider biochemical markers as clinically indicated  -Consider abdominal with or without pelvic multiphasic CT or MRI with contrast  -Consider chest CT with and without contrast for primary lung/thymus tumors (as clinically indicated for primary GI tumors)    3. >10 years:  Consider surveillance as clinically indicated    -Reviewed/discussed labs revealing mild hypokalemia for which he will increase his dietary sodium; labs otherwise stable. Clinically stable. Continue surveillance  -A/P imaging on 1/18/22 with no evidence of recurrence or metastatic disease; he is already scheduled for repeat CT CAP 7/7/2022 - discussed with him if stable findings pursuing repeat imaging for surveillance and follow up visits annually  -RTC for labs, visit, scan review in 8 weeks     -Advised to contact the office in the interim for any clinical questions or  concerns; sooner appointment for any clinical decline

## 2022-06-14 ENCOUNTER — HOSPITAL ENCOUNTER (EMERGENCY)
Facility: HOSPITAL | Age: 67
Discharge: HOME | End: 2022-06-14
Attending: STUDENT IN AN ORGANIZED HEALTH CARE EDUCATION/TRAINING PROGRAM
Payer: MEDICARE

## 2022-06-14 VITALS
WEIGHT: 245 LBS | DIASTOLIC BLOOD PRESSURE: 105 MMHG | HEART RATE: 65 BPM | HEIGHT: 69 IN | OXYGEN SATURATION: 98 % | BODY MASS INDEX: 36.29 KG/M2 | TEMPERATURE: 98 F | RESPIRATION RATE: 18 BRPM | SYSTOLIC BLOOD PRESSURE: 154 MMHG

## 2022-06-14 DIAGNOSIS — R11.0 NAUSEA: ICD-10-CM

## 2022-06-14 DIAGNOSIS — I10 ASYMPTOMATIC HYPERTENSION: Primary | ICD-10-CM

## 2022-06-14 LAB
ALBUMIN SERPL-MCNC: 4.2 GM/DL (ref 3.4–4.8)
ALBUMIN/GLOB SERPL: 1.2 RATIO (ref 1.1–2)
ALP SERPL-CCNC: 62 UNIT/L (ref 40–150)
ALT SERPL-CCNC: 21 UNIT/L (ref 0–55)
AMYLASE SERPL-CCNC: 73 UNIT/L (ref 25–125)
AST SERPL-CCNC: 23 UNIT/L (ref 5–34)
BASOPHILS # BLD AUTO: 0.08 X10(3)/MCL (ref 0–0.2)
BASOPHILS NFR BLD AUTO: 1.1 %
BILIRUBIN DIRECT+TOT PNL SERPL-MCNC: 1.8 MG/DL
BUN SERPL-MCNC: 12.4 MG/DL (ref 8.4–25.7)
CALCIUM SERPL-MCNC: 9.6 MG/DL (ref 8.8–10)
CHLORIDE SERPL-SCNC: 106 MMOL/L (ref 98–107)
CO2 SERPL-SCNC: 26 MMOL/L (ref 23–31)
CREAT SERPL-MCNC: 1.13 MG/DL (ref 0.73–1.18)
EOSINOPHIL # BLD AUTO: 0.1 X10(3)/MCL (ref 0–0.9)
EOSINOPHIL NFR BLD AUTO: 1.4 %
ERYTHROCYTE [DISTWIDTH] IN BLOOD BY AUTOMATED COUNT: 13.7 % (ref 11.5–17)
GLOBULIN SER-MCNC: 3.5 GM/DL (ref 2.4–3.5)
GLUCOSE SERPL-MCNC: 92 MG/DL (ref 82–115)
HCT VFR BLD AUTO: 47.3 % (ref 42–52)
HGB BLD-MCNC: 15.1 GM/DL (ref 14–18)
IMM GRANULOCYTES # BLD AUTO: 0.04 X10(3)/MCL (ref 0–0.02)
IMM GRANULOCYTES NFR BLD AUTO: 0.5 % (ref 0–0.43)
LIPASE SERPL-CCNC: 28 U/L
LYMPHOCYTES # BLD AUTO: 2.9 X10(3)/MCL (ref 0.6–4.6)
LYMPHOCYTES NFR BLD AUTO: 39.3 %
MCH RBC QN AUTO: 28.7 PG (ref 27–31)
MCHC RBC AUTO-ENTMCNC: 31.9 MG/DL (ref 33–36)
MCV RBC AUTO: 89.9 FL (ref 80–94)
MONOCYTES # BLD AUTO: 0.87 X10(3)/MCL (ref 0.1–1.3)
MONOCYTES NFR BLD AUTO: 11.8 %
NEUTROPHILS # BLD AUTO: 3.4 X10(3)/MCL (ref 2.1–9.2)
NEUTROPHILS NFR BLD AUTO: 45.9 %
NRBC BLD AUTO-RTO: 0 %
PLATELET # BLD AUTO: 258 X10(3)/MCL (ref 130–400)
PMV BLD AUTO: 10.3 FL (ref 9.4–12.4)
POTASSIUM SERPL-SCNC: 3.5 MMOL/L (ref 3.5–5.1)
PROT SERPL-MCNC: 7.7 GM/DL (ref 5.8–7.6)
RBC # BLD AUTO: 5.26 X10(6)/MCL (ref 4.7–6.1)
SODIUM SERPL-SCNC: 142 MMOL/L (ref 136–145)
TROPONIN I SERPL-MCNC: <0.01 NG/ML (ref 0–0.04)
WBC # SPEC AUTO: 7.4 X10(3)/MCL (ref 4.5–11.5)

## 2022-06-14 PROCEDURE — 80053 COMPREHEN METABOLIC PANEL: CPT | Performed by: PHYSICIAN ASSISTANT

## 2022-06-14 PROCEDURE — 82150 ASSAY OF AMYLASE: CPT | Performed by: PHYSICIAN ASSISTANT

## 2022-06-14 PROCEDURE — 83690 ASSAY OF LIPASE: CPT | Performed by: PHYSICIAN ASSISTANT

## 2022-06-14 PROCEDURE — 84484 ASSAY OF TROPONIN QUANT: CPT | Performed by: PHYSICIAN ASSISTANT

## 2022-06-14 PROCEDURE — 93005 ELECTROCARDIOGRAM TRACING: CPT

## 2022-06-14 PROCEDURE — 85025 COMPLETE CBC W/AUTO DIFF WBC: CPT | Performed by: PHYSICIAN ASSISTANT

## 2022-06-14 PROCEDURE — 36415 COLL VENOUS BLD VENIPUNCTURE: CPT | Performed by: PHYSICIAN ASSISTANT

## 2022-06-14 PROCEDURE — 99285 EMERGENCY DEPT VISIT HI MDM: CPT | Mod: 25

## 2022-06-14 PROCEDURE — 36000 PLACE NEEDLE IN VEIN: CPT

## 2022-06-14 NOTE — ED PROVIDER NOTES
Encounter Date: 6/14/2022       History     Chief Complaint   Patient presents with    Nausea     Nausea since earlier this morning. Denies abd pain, diarrhea, or vomiting.     Patient reports feeling nauseous this morning during breakfast at around 7am but reports the symptoms completely resolved after a few minutes; pt reports he did not take any meds this morning    The history is provided by the patient.   Nausea  This is a new problem. The current episode started 6 to 12 hours ago. The problem occurs every several days. The problem has been resolved. Pertinent negatives include no chest pain, no abdominal pain and no shortness of breath. Nothing aggravates the symptoms. Nothing relieves the symptoms. He has tried nothing for the symptoms.     Review of patient's allergies indicates:  No Known Allergies  Past Medical History:   Diagnosis Date    Atrial fibrillation     BPH (benign prostatic hyperplasia)     Cardiac arrest     Coronary artery disease     Cyst, kidney, acquired     Diverticulosis     Hyperlipidemia     Hypertension     MI (myocardial infarction)     Obesity     Steatosis of liver      Past Surgical History:   Procedure Laterality Date    CARDIAC CATHETERIZATION      COLONOSCOPY W/ BIOPSIES      excision of colon       Family History   Problem Relation Age of Onset    Hypertension Mother     Hypertension Father     Hypertension Sister      Social History     Tobacco Use    Smoking status: Former Smoker    Smokeless tobacco: Never Used   Substance Use Topics    Alcohol use: Not Currently    Drug use: Not Currently     Review of Systems   Constitutional: Negative for fever.   HENT: Negative for sore throat.    Respiratory: Negative for shortness of breath.    Cardiovascular: Negative for chest pain.   Gastrointestinal: Positive for nausea. Negative for abdominal pain.   Genitourinary: Negative for dysuria.   Musculoskeletal: Negative for back pain.   Skin: Negative for rash.    Neurological: Negative for weakness.   Hematological: Does not bruise/bleed easily.   Psychiatric/Behavioral: Negative.        Physical Exam     Initial Vitals [06/14/22 1332]   BP Pulse Resp Temp SpO2   (!) 154/113 78 18 97.7 °F (36.5 °C) 99 %      MAP       --         Physical Exam    Constitutional: He appears well-developed.   HENT:   Head: Atraumatic.   Eyes: Conjunctivae are normal.   Neck:   Normal range of motion.  Cardiovascular: Normal rate and regular rhythm.   Pulmonary/Chest: Breath sounds normal. He exhibits no tenderness.   Abdominal: Abdomen is soft. Bowel sounds are normal. There is no abdominal tenderness.   Musculoskeletal:         General: Normal range of motion.      Cervical back: Normal range of motion.     Neurological: He is alert and oriented to person, place, and time.   Skin: Skin is warm. No pallor.   Psychiatric: He has a normal mood and affect. His behavior is normal. Judgment and thought content normal.         ED Course   Procedures  Labs Reviewed   COMPREHENSIVE METABOLIC PANEL - Abnormal; Notable for the following components:       Result Value    Protein Total 7.7 (*)     Bilirubin Total 1.8 (*)     All other components within normal limits   CBC WITH DIFFERENTIAL - Abnormal; Notable for the following components:    MCHC 31.9 (*)     IG# 0.04 (*)     IG% 0.5 (*)     All other components within normal limits   AMYLASE - Normal   LIPASE - Normal   TROPONIN I - Normal   CBC W/ AUTO DIFFERENTIAL    Narrative:     The following orders were created for panel order CBC auto differential.  Procedure                               Abnormality         Status                     ---------                               -----------         ------                     CBC with Differential[[id number]]        Abnormal            Final result                 Please view results for these tests on the individual orders.   EXTRA TUBES    Narrative:     The following orders were created for panel  order EXTRA TUBES.  Procedure                               Abnormality         Status                     ---------                               -----------         ------                     Light Blue Top Hold[[id number]]                              In process                 Red Top Hold[[id number]]                                     In process                   Please view results for these tests on the individual orders.   LIGHT BLUE TOP HOLD   RED TOP HOLD        ECG Results          EKG 12-lead (In process)  Result time 06/14/22 15:30:04    In process by Interface, Lab In [facility] (06/14/22 15:30:04)                 Narrative:    Test Reason : R11.0,    Vent. Rate : 096 BPM     Atrial Rate : 267 BPM     P-R Int : 000 ms          QRS Dur : 086 ms      QT Int : 356 ms       P-R-T Axes : 000 105 262 degrees     QTc Int : 449 ms    Atrial fibrillation  Rightward axis  ST and T wave abnormality, consider inferolateral ischemia  Abnormal ECG  No previous ECGs available    Referred By: [email or web address]   SELF           Confirmed By:                             Imaging Results          XR ABDOMEN  ACUTE 2 OR MORE VIEWS WITH CHEST (Final result)  Result time 06/14/22 15:49:26    Final result by Jg Plunkett MD (06/14/22 15:49:26)                 Impression:      No acute cardiopulmonary process.  Nonobstructive bowel gas pattern.      Electronically signed by: Jg Plunkett  Date:    06/14/2022  Time:    15:49             Narrative:    EXAMINATION:  XR ABDOMEN ACUTE 2 OR MORE VIEWS WITH CHEST    CLINICAL HISTORY:  nausea;    TECHNIQUE:  Single view of the chest with flat and upright imaging of the abdomen.    COMPARISON:  None    FINDINGS:  Cardiomediastinal silhouette is within normal limits.  No focal opacification.  No pleural effusion or pneumothorax.  No acute osseous abnormality.  Nonobstructive bowel gas pattern.                                 Medications - No data to display              ED Course as of  06/14/22 1704 Tue Jun 14, 2022   1600 Patient reports not taking any of his home meds since he came to the hospital to get checked for his nausea [AL]   1651 Patient reports no nausea/vomiting/abdominal pain/chest pain at this time and reports no symptoms since 7am [AL]      ED Course User Index  [AL] KAVIN Vences             Clinical Impression:   Final diagnoses:  [R11.0] Nausea  [I10] Asymptomatic hypertension (Primary)          ED Disposition Condition    Discharge Stable        ED Prescriptions     None        Follow-up Information     Follow up With Specialties Details Why Contact Info    discharge followup    If your symptoms become WORSE or you DO NOT IMPROVE and you are unable to reach your health care provider, you should RETURN to the emergency department    discharge info    Discussed all pertinent ED information, results, diagnosis and treatment plan; All questions and concerns were addressed at this time. Patient voices understanding of information and instructions. Patient is comfortable with plan and discharge           KAVIN Vences  06/14/22 1704

## 2022-06-16 ENCOUNTER — HOSPITAL ENCOUNTER (EMERGENCY)
Facility: HOSPITAL | Age: 67
Discharge: HOME | End: 2022-06-17
Attending: FAMILY MEDICINE
Payer: MEDICARE

## 2022-06-16 DIAGNOSIS — R11.2 NAUSEA AND VOMITING, INTRACTABILITY OF VOMITING NOT SPECIFIED, UNSPECIFIED VOMITING TYPE: Primary | ICD-10-CM

## 2022-06-16 DIAGNOSIS — R10.13 EPIGASTRIC PAIN: ICD-10-CM

## 2022-06-16 DIAGNOSIS — R10.9 ABDOMINAL PAIN: ICD-10-CM

## 2022-06-16 PROCEDURE — 96360 HYDRATION IV INFUSION INIT: CPT

## 2022-06-16 PROCEDURE — 99285 EMERGENCY DEPT VISIT HI MDM: CPT | Mod: 25

## 2022-06-16 RX ORDER — LIDOCAINE HYDROCHLORIDE 20 MG/ML
10 SOLUTION OROPHARYNGEAL ONCE
Status: COMPLETED | OUTPATIENT
Start: 2022-06-17 | End: 2022-06-17

## 2022-06-16 RX ORDER — MAG HYDROX/ALUMINUM HYD/SIMETH 200-200-20
30 SUSPENSION, ORAL (FINAL DOSE FORM) ORAL ONCE
Status: COMPLETED | OUTPATIENT
Start: 2022-06-17 | End: 2022-06-17

## 2022-06-17 VITALS
SYSTOLIC BLOOD PRESSURE: 144 MMHG | OXYGEN SATURATION: 99 % | HEIGHT: 69 IN | BODY MASS INDEX: 35.55 KG/M2 | WEIGHT: 240 LBS | RESPIRATION RATE: 18 BRPM | TEMPERATURE: 98 F | DIASTOLIC BLOOD PRESSURE: 97 MMHG | HEART RATE: 68 BPM

## 2022-06-17 LAB
ALBUMIN SERPL-MCNC: 3.9 GM/DL (ref 3.4–4.8)
ALBUMIN/GLOB SERPL: 1.3 RATIO (ref 1.1–2)
ALP SERPL-CCNC: 67 UNIT/L (ref 40–150)
ALT SERPL-CCNC: 16 UNIT/L (ref 0–55)
APPEARANCE UR: CLEAR
AST SERPL-CCNC: 16 UNIT/L (ref 5–34)
BACTERIA #/AREA URNS AUTO: ABNORMAL /HPF
BASOPHILS # BLD AUTO: 0.08 X10(3)/MCL (ref 0–0.2)
BASOPHILS NFR BLD AUTO: 1 %
BILIRUB UR QL STRIP.AUTO: NEGATIVE MG/DL
BILIRUBIN DIRECT+TOT PNL SERPL-MCNC: 1.2 MG/DL
BUN SERPL-MCNC: 12.3 MG/DL (ref 8.4–25.7)
CALCIUM SERPL-MCNC: 8.8 MG/DL (ref 8.8–10)
CHLORIDE SERPL-SCNC: 107 MMOL/L (ref 98–107)
CO2 SERPL-SCNC: 24 MMOL/L (ref 23–31)
COLOR UR AUTO: ABNORMAL
CREAT SERPL-MCNC: 1.08 MG/DL (ref 0.73–1.18)
EOSINOPHIL # BLD AUTO: 0.13 X10(3)/MCL (ref 0–0.9)
EOSINOPHIL NFR BLD AUTO: 1.7 %
ERYTHROCYTE [DISTWIDTH] IN BLOOD BY AUTOMATED COUNT: 13.4 % (ref 11.5–17)
GLOBULIN SER-MCNC: 2.9 GM/DL (ref 2.4–3.5)
GLUCOSE SERPL-MCNC: 120 MG/DL (ref 82–115)
GLUCOSE UR QL STRIP.AUTO: NORMAL MG/DL
HCT VFR BLD AUTO: 41.3 % (ref 42–52)
HGB BLD-MCNC: 14 GM/DL (ref 14–18)
HYALINE CASTS #/AREA URNS LPF: ABNORMAL /LPF
IMM GRANULOCYTES # BLD AUTO: 0.04 X10(3)/MCL (ref 0–0.02)
IMM GRANULOCYTES NFR BLD AUTO: 0.5 % (ref 0–0.43)
KETONES UR QL STRIP.AUTO: NEGATIVE MG/DL
LEUKOCYTE ESTERASE UR QL STRIP.AUTO: NEGATIVE UNIT/L
LIPASE SERPL-CCNC: 35 U/L
LYMPHOCYTES # BLD AUTO: 2.51 X10(3)/MCL (ref 0.6–4.6)
LYMPHOCYTES NFR BLD AUTO: 32.5 %
MCH RBC QN AUTO: 30.2 PG (ref 27–31)
MCHC RBC AUTO-ENTMCNC: 33.9 MG/DL (ref 33–36)
MCV RBC AUTO: 89.2 FL (ref 80–94)
MONOCYTES # BLD AUTO: 0.7 X10(3)/MCL (ref 0.1–1.3)
MONOCYTES NFR BLD AUTO: 9.1 %
MUCOUS THREADS URNS QL MICRO: ABNORMAL /LPF
NEUTROPHILS # BLD AUTO: 4.3 X10(3)/MCL (ref 2.1–9.2)
NEUTROPHILS NFR BLD AUTO: 55.2 %
NITRITE UR QL STRIP.AUTO: NEGATIVE
NRBC BLD AUTO-RTO: 0 %
PH UR STRIP.AUTO: 6 [PH]
PLATELET # BLD AUTO: 222 X10(3)/MCL (ref 130–400)
PMV BLD AUTO: 10 FL (ref 9.4–12.4)
POTASSIUM SERPL-SCNC: 3.5 MMOL/L (ref 3.5–5.1)
PROT SERPL-MCNC: 6.8 GM/DL (ref 5.8–7.6)
PROT UR QL STRIP.AUTO: ABNORMAL MG/DL
RBC # BLD AUTO: 4.63 X10(6)/MCL (ref 4.7–6.1)
RBC #/AREA URNS AUTO: ABNORMAL /HPF
RBC UR QL AUTO: ABNORMAL UNIT/L
SODIUM SERPL-SCNC: 139 MMOL/L (ref 136–145)
SP GR UR STRIP.AUTO: 1.01
SQUAMOUS #/AREA URNS LPF: ABNORMAL /HPF
TROPONIN I SERPL-MCNC: <0.01 NG/ML (ref 0–0.04)
UROBILINOGEN UR STRIP-ACNC: NORMAL MG/DL
WBC # SPEC AUTO: 7.7 X10(3)/MCL (ref 4.5–11.5)
WBC #/AREA URNS AUTO: ABNORMAL /HPF

## 2022-06-17 PROCEDURE — 85025 COMPLETE CBC W/AUTO DIFF WBC: CPT | Performed by: NURSE PRACTITIONER

## 2022-06-17 PROCEDURE — 84484 ASSAY OF TROPONIN QUANT: CPT | Performed by: NURSE PRACTITIONER

## 2022-06-17 PROCEDURE — 83690 ASSAY OF LIPASE: CPT | Performed by: NURSE PRACTITIONER

## 2022-06-17 PROCEDURE — 81001 URINALYSIS AUTO W/SCOPE: CPT | Performed by: NURSE PRACTITIONER

## 2022-06-17 PROCEDURE — 96372 THER/PROPH/DIAG INJ SC/IM: CPT | Performed by: NURSE PRACTITIONER

## 2022-06-17 PROCEDURE — 80053 COMPREHEN METABOLIC PANEL: CPT | Performed by: NURSE PRACTITIONER

## 2022-06-17 PROCEDURE — 63600175 PHARM REV CODE 636 W HCPCS: Performed by: NURSE PRACTITIONER

## 2022-06-17 PROCEDURE — 93005 ELECTROCARDIOGRAM TRACING: CPT

## 2022-06-17 PROCEDURE — 36415 COLL VENOUS BLD VENIPUNCTURE: CPT | Performed by: NURSE PRACTITIONER

## 2022-06-17 PROCEDURE — 25000003 PHARM REV CODE 250: Performed by: NURSE PRACTITIONER

## 2022-06-17 RX ORDER — OMEPRAZOLE 20 MG/1
20 CAPSULE, DELAYED RELEASE ORAL DAILY
Qty: 30 CAPSULE | Refills: 2 | Status: SHIPPED | OUTPATIENT
Start: 2022-06-17 | End: 2022-10-19 | Stop reason: SDUPTHER

## 2022-06-17 RX ORDER — DICYCLOMINE HYDROCHLORIDE 20 MG/1
20 TABLET ORAL 2 TIMES DAILY
Qty: 20 TABLET | Refills: 0 | Status: SHIPPED | OUTPATIENT
Start: 2022-06-17 | End: 2022-06-27

## 2022-06-17 RX ORDER — ONDANSETRON 4 MG/1
4 TABLET, ORALLY DISINTEGRATING ORAL EVERY 8 HOURS PRN
Qty: 9 TABLET | Refills: 0 | Status: SHIPPED | OUTPATIENT
Start: 2022-06-17 | End: 2022-06-20

## 2022-06-17 RX ORDER — METOCLOPRAMIDE HYDROCHLORIDE 5 MG/ML
10 INJECTION INTRAMUSCULAR; INTRAVENOUS
Status: COMPLETED | OUTPATIENT
Start: 2022-06-17 | End: 2022-06-17

## 2022-06-17 RX ORDER — CLONIDINE HYDROCHLORIDE 0.1 MG/1
0.2 TABLET ORAL
Status: COMPLETED | OUTPATIENT
Start: 2022-06-17 | End: 2022-06-17

## 2022-06-17 RX ADMIN — ALUMINUM HYDROXIDE, MAGNESIUM HYDROXIDE, AND SIMETHICONE 30 ML: 200; 200; 20 SUSPENSION ORAL at 12:06

## 2022-06-17 RX ADMIN — LIDOCAINE HYDROCHLORIDE 10 ML: 20 SOLUTION ORAL; TOPICAL at 12:06

## 2022-06-17 RX ADMIN — CLONIDINE HYDROCHLORIDE 0.2 MG: 0.1 TABLET ORAL at 01:06

## 2022-06-17 RX ADMIN — METOCLOPRAMIDE 10 MG: 5 INJECTION, SOLUTION INTRAMUSCULAR; INTRAVENOUS at 12:06

## 2022-06-17 RX ADMIN — SODIUM CHLORIDE 1000 ML: 9 INJECTION, SOLUTION INTRAVENOUS at 12:06

## 2022-06-17 NOTE — DISCHARGE INSTRUCTIONS
Increase fluid intake, clear liquids x 12 hours. Advance diet slowly.   Avoid greasy, spicy foods.  Return to the [facility] ED for worsening pain, chest pain, or bleeding in emesis or stool.

## 2022-06-17 NOTE — ED PROVIDER NOTES
Encounter Date: 6/16/2022       History     Chief Complaint   Patient presents with    Abdominal Pain     Abd pain and nausea this PM. HTN in triage. Here 2 days prior with same S&S.      Pt is a 66 y.o. male who presents to the [facility] ED complaining of nausea, vomiting which began after he ate supper tonight. Reports a similar episode of symptoms a few days ago as well. Denies chest pain, SOB, weakness, dizziness, or fever. Pt slightly hypertensive at this time. Reports gagging and vomiting prior to arrival. Denies headache or blurry vision.         Review of patient's allergies indicates:  No Known Allergies  Past Medical History:   Diagnosis Date    Atrial fibrillation     BPH (benign prostatic hyperplasia)     Cardiac arrest     Coronary artery disease     Cyst, kidney, acquired     Diverticulosis     Hyperlipidemia     Hypertension     MI (myocardial infarction)     Obesity     Steatosis of liver      Past Surgical History:   Procedure Laterality Date    CARDIAC CATHETERIZATION      COLONOSCOPY W/ BIOPSIES      excision of colon       Family History   Problem Relation Age of Onset    Hypertension Mother     Hypertension Father     Hypertension Sister      Social History     Tobacco Use    Smoking status: Former Smoker    Smokeless tobacco: Never Used   Substance Use Topics    Alcohol use: Not Currently    Drug use: Not Currently     Review of Systems   Constitutional: Negative for chills, diaphoresis, fatigue and fever.   HENT: Negative for facial swelling, postnasal drip, rhinorrhea, sinus pressure, sinus pain, sore throat and trouble swallowing.    Respiratory: Negative for cough, chest tightness, shortness of breath and wheezing.    Cardiovascular: Negative for chest pain, palpitations and leg swelling.   Gastrointestinal: Positive for abdominal pain and nausea. Negative for diarrhea and vomiting.   Genitourinary: Negative for dysuria, flank pain, hematuria and urgency.   Musculoskeletal:  Negative for arthralgias, back pain and myalgias.   Skin: Negative for color change and rash.   Neurological: Negative for dizziness, syncope, weakness and headaches.   Hematological: Does not bruise/bleed easily.   All other systems reviewed and are negative.      Physical Exam     Initial Vitals [06/16/22 2352]   BP Pulse Resp Temp SpO2   (!) 167/111 80 20 98.2 °F (36.8 °C) 99 %      MAP       --         Physical Exam    Nursing note and vitals reviewed.  Constitutional: Vital signs are normal. He appears well-developed and well-nourished.   HENT:   Head: Normocephalic.   Nose: Nose normal.   Mouth/Throat: Oropharynx is clear and moist.   Eyes: Conjunctivae and EOM are normal. Pupils are equal, round, and reactive to light.   Neck: Neck supple.   Normal range of motion.  Cardiovascular: Normal rate, regular rhythm, normal heart sounds and intact distal pulses.   Pulmonary/Chest: Effort normal and breath sounds normal. No respiratory distress. He has no wheezes. He has no rhonchi. He has no rales. He exhibits no tenderness.   Abdominal: Abdomen is soft and flat. Bowel sounds are normal. There is abdominal tenderness in the epigastric area. There is no rebound, no guarding, no tenderness at McBurney's point and negative Lake's sign.   Musculoskeletal:         General: Normal range of motion.      Cervical back: Normal range of motion and neck supple.     Neurological: He is alert and oriented to person, place, and time. He has normal strength.   Skin: Skin is warm and dry. Capillary refill takes less than 2 seconds.   Psychiatric: He has a normal mood and affect. His behavior is normal. Judgment and thought content normal.         ED Course   Procedures  Labs Reviewed   URINALYSIS, REFLEX TO URINE CULTURE - Abnormal; Notable for the following components:       Result Value    Color, UA Light-Yellow (*)     Protein, UA 1+ (*)     Blood, UA 1+ (*)     Squamous Epithelial Cells, UA Trace (*)     Mucous, UA Trace (*)      All other components within normal limits   COMPREHENSIVE METABOLIC PANEL - Abnormal; Notable for the following components:    Glucose Level 120 (*)     All other components within normal limits   CBC WITH DIFFERENTIAL - Abnormal; Notable for the following components:    RBC 4.63 (*)     Hct 41.3 (*)     IG# 0.04 (*)     IG% 0.5 (*)     All other components within normal limits   LIPASE - Normal   TROPONIN I - Normal   CBC W/ AUTO DIFFERENTIAL    Narrative:     The following orders were created for panel order CBC auto differential.  Procedure                               Abnormality         Status                     ---------                               -----------         ------                     CBC with Differential[[id number]]        Abnormal            Final result                 Please view results for these tests on the individual orders.          Imaging Results          X-Ray Abdomen Flat And Erect (Preliminary result)  Result time 06/17/22 01:50:38    ED Interpretation by TRUMAN Garcia Jr. (06/17/22 01:50:38, [facility], Emergency Medicine)    Nonspecific bowel gas without obstruction noted.                                Medications   sodium chloride 0.9% bolus 1,000 mL (0 mLs Intravenous Stopped 6/17/22 0120)   aluminum-magnesium hydroxide-simethicone 200-200-20 mg/5 mL suspension 30 mL (30 mLs Oral Given 6/17/22 0020)     And   LIDOcaine HCl 2% oral solution 10 mL (10 mLs Oral Given 6/17/22 0020)   metoclopramide HCl injection 10 mg (10 mg Intramuscular Given 6/17/22 0019)   cloNIDine tablet 0.2 mg (0.2 mg Oral Given 6/17/22 0108)     Medical Decision Making:   Differential Diagnosis:   GERD  Gastroenteritis  AMI  Clinical Tests:   Lab Tests: Ordered  ED Management:  1:56 AM Reassessed patient at this time. Reports condition has improved. Discussed with patient all pertinent ED information and results. Discussed diagnosis and treatment plan with patient. Follow  up instructions and return to ED instruction have been given. All questions and concerns were addressed at this time. Patient voices understanding of information and instructions. Patient is comfortable with plan and discharge. Patient is stable for discharge.                         Clinical Impression:   Final diagnoses:  [R10.13] Epigastric pain  [R10.9] Abdominal pain  [R11.2] Nausea and vomiting, intractability of vomiting not specified, unspecified vomiting type (Primary)          ED Disposition Condition    Discharge Stable        ED Prescriptions     Medication Sig Dispense Start Date End Date Auth. Provider    dicyclomine (BENTYL) 20 mg tablet Take 1 tablet (20 mg total) by mouth 2 (two) times daily. for 10 days 20 tablet 6/17/2022 6/27/2022 Tani Hernandez Jr., ANTONIOP    ondansetron (ZOFRAN-ODT) 4 MG TbDL Take 1 tablet (4 mg total) by mouth every 8 (eight) hours as needed (Nausea). 9 tablet 6/17/2022 6/20/2022 Tani Hernandez Jr., ANTONIOP    omeprazole (PRILOSEC) 20 MG capsule Take 1 capsule (20 mg total) by mouth once daily. 30 capsule 6/17/2022 7/17/2022 Tani Hernandez Jr., TRUMAN        Follow-up Information     Follow up With Specialties Details Why Contact Info    [facility] Emergency Medicine In 3 days As needed, If symptoms worsen [address] [facility] [id number] [phone]           TRUMAN Garcia Jr.  06/17/22 0158

## 2022-06-26 ENCOUNTER — HOSPITAL ENCOUNTER (EMERGENCY)
Facility: HOSPITAL | Age: 67
Discharge: HOME | End: 2022-06-26
Attending: STUDENT IN AN ORGANIZED HEALTH CARE EDUCATION/TRAINING PROGRAM
Payer: MEDICARE

## 2022-06-26 VITALS
BODY MASS INDEX: 34.91 KG/M2 | HEART RATE: 60 BPM | HEIGHT: 69 IN | DIASTOLIC BLOOD PRESSURE: 98 MMHG | WEIGHT: 235.69 LBS | RESPIRATION RATE: 18 BRPM | OXYGEN SATURATION: 100 % | TEMPERATURE: 98 F | SYSTOLIC BLOOD PRESSURE: 157 MMHG

## 2022-06-26 DIAGNOSIS — M54.50 ACUTE BILATERAL LOW BACK PAIN WITHOUT SCIATICA: Primary | ICD-10-CM

## 2022-06-26 PROCEDURE — 25000003 PHARM REV CODE 250: Performed by: NURSE PRACTITIONER

## 2022-06-26 PROCEDURE — 99283 EMERGENCY DEPT VISIT LOW MDM: CPT

## 2022-06-26 RX ORDER — ACETAMINOPHEN AND CODEINE PHOSPHATE 300; 30 MG/1; MG/1
1 TABLET ORAL
Status: COMPLETED | OUTPATIENT
Start: 2022-06-26 | End: 2022-06-26

## 2022-06-26 RX ORDER — ACETAMINOPHEN AND CODEINE PHOSPHATE 300; 30 MG/1; MG/1
1 TABLET ORAL EVERY 6 HOURS PRN
Qty: 12 TABLET | Refills: 0 | Status: SHIPPED | OUTPATIENT
Start: 2022-06-26 | End: 2022-07-06

## 2022-06-26 RX ADMIN — ACETAMINOPHEN AND CODEINE PHOSPHATE 1 TABLET: 300; 30 TABLET ORAL at 06:06

## 2022-06-26 NOTE — ED PROVIDER NOTES
Encounter Date: 6/26/2022       History     Chief Complaint   Patient presents with    Back Pain     Co nontraumatic lower back pain since yesterday      The patient presents with back pain and lumbar pain.  The onset was yesterday.  The course/duration of symptoms is constant.  Type of injury: none and none recent, denies falls.  Location: bilateral lumbar. Radiating pain: none. The character of symptoms is dull.  The degree at onset was moderate.  The degree at present is moderate.  There are exacerbating factors including movement and changing position.  The relieving factor is rest.  Risk factors consist of none.  Prior episodes: chronic.  Therapy today: none.  Associated symptoms: none, denies bowel dysfunction, denies bladder dysfunction, denies altered sensation, denies focal weakness, denies saddle numbness, denies abdominal pain and denies dysuria.        Review of patient's allergies indicates:  No Known Allergies  Past Medical History:   Diagnosis Date    Atrial fibrillation     BPH (benign prostatic hyperplasia)     Cardiac arrest     Coronary artery disease     Cyst, kidney, acquired     Diverticulosis     Hyperlipidemia     Hypertension     MI (myocardial infarction)     Obesity     Steatosis of liver      Past Surgical History:   Procedure Laterality Date    CARDIAC CATHETERIZATION      COLONOSCOPY W/ BIOPSIES      excision of colon       Family History   Problem Relation Age of Onset    Hypertension Mother     Hypertension Father     Hypertension Sister      Social History     Tobacco Use    Smoking status: Former Smoker    Smokeless tobacco: Never Used   Substance Use Topics    Alcohol use: Not Currently    Drug use: Not Currently     Review of Systems   Constitutional: Negative for fever.   HENT: Negative for sore throat.    Respiratory: Negative for shortness of breath.    Cardiovascular: Negative for chest pain.   Gastrointestinal: Negative for nausea.   Genitourinary:  Negative for dysuria.   Musculoskeletal: Positive for back pain.   Skin: Negative for rash.   Neurological: Negative for weakness.   Hematological: Does not bruise/bleed easily.   All other systems reviewed and are negative.      Physical Exam     Initial Vitals   BP Pulse Resp Temp SpO2   06/26/22 1718 06/26/22 1716 06/26/22 1716 06/26/22 1716 06/26/22 1716   (!) 157/98 60 18 98.2 °F (36.8 °C) 100 %      MAP       --                Physical Exam    Nursing note and vitals reviewed.  Constitutional: He appears well-developed and well-nourished.   HENT:   Head: Normocephalic and atraumatic.   Neck: Neck supple.   Normal range of motion.  Cardiovascular: Normal rate, regular rhythm, normal heart sounds and intact distal pulses.   Pulmonary/Chest: Breath sounds normal.   Abdominal: Abdomen is soft. Bowel sounds are normal.   Musculoskeletal:         General: Normal range of motion.      Cervical back: Normal range of motion and neck supple.      Comments: No costovertebral angle tenderness, , Thoracic: no vertebral point tenderness, Lumbar: bilateral, mild, tenderness, midline negative, no vertebral point tenderness, Sacral: no vertebral point tenderness, Testing: Straight leg raising, supine negative.  No C/T/L point tenderness. normal reflexes.     Neurological: He is alert and oriented to person, place, and time. He has normal strength.   Skin: Skin is warm and dry.   Psychiatric: He has a normal mood and affect.         ED Course   Procedures  Labs Reviewed - No data to display       Imaging Results    None          Medications   acetaminophen-codeine 300-30mg per tablet 1 tablet (has no administration in time range)     Medical Decision Making:   History:   Old Records Summarized: records from clinic visits and records from previous admission(s).  6:02 PM DISPOSITION: The patient is resting comfortably in no acute distress.  He is hemodynamically stable and is without objective evidence for acute process requiring  urgent intervention or hospitalization. I provided counseling to patient with regard to condition, the treatment plan, specific conditions for return, and the importance of follow up. Detailed written and verbal instructions provided to patient and he expressed a verbal understanding of the discharge instructions and overall management plan. Reiterated the importance of medication administration and safety and advised patient to follow up with primary care provider in 3-5 days or sooner if needed.  Answered questions at this time. The patient is stable for discharge.                       Clinical Impression:   Final diagnoses:  [M54.50] Acute bilateral low back pain without sciatica (Primary)          ED Disposition Condition    Discharge Stable        ED Prescriptions     Medication Sig Dispense Start Date End Date Auth. Provider    acetaminophen-codeine 300-30mg (TYLENOL #3) 300-30 mg Tab Take 1 tablet by mouth every 6 (six) hours as needed (severe pain). 12 tablet 6/26/2022 7/6/2022 MARISELA Vargas        Follow-up Information     Follow up With Specialties Details Why Contact Info    follow up with your pcp in 3-5 days        [facility] Emergency Medicine  If symptoms worsen [address] [facility] [id number] [phone]           MARISELA Vargas  06/26/22 2815

## 2022-07-07 ENCOUNTER — HOSPITAL ENCOUNTER (OUTPATIENT)
Dept: RADIOLOGY | Facility: HOSPITAL | Age: 67
Discharge: HOME | End: 2022-07-07
Attending: NURSE PRACTITIONER
Payer: MEDICARE

## 2022-07-07 DIAGNOSIS — Z85.9 HISTORY OF MALIGNANT NEUROENDOCRINE TUMOR: ICD-10-CM

## 2022-07-07 PROCEDURE — 74177 CT ABD & PELVIS W/CONTRAST: CPT | Mod: TC

## 2022-07-07 PROCEDURE — 25500020 PHARM REV CODE 255

## 2022-07-07 PROCEDURE — 71260 CT THORAX DX C+: CPT | Mod: TC

## 2022-07-07 PROCEDURE — A9698 NON-RAD CONTRAST MATERIALNOC: HCPCS

## 2022-07-07 RX ADMIN — IOPAMIDOL 100 ML: 755 INJECTION, SOLUTION INTRAVENOUS at 08:07

## 2022-07-07 RX ADMIN — BARIUM SULFATE 450 ML: 20 SUSPENSION ORAL at 07:07

## 2022-07-19 PROBLEM — C7A.8 NEUROENDOCRINE CARCINOMA OF SMALL BOWEL: Status: ACTIVE | Noted: 2022-07-19

## 2022-07-19 PROBLEM — C7A.8: Status: RESOLVED | Noted: 2022-05-25 | Resolved: 2022-07-19

## 2022-07-20 ENCOUNTER — TELEPHONE (OUTPATIENT)
Dept: HEMATOLOGY/ONCOLOGY | Facility: CLINIC | Age: 67
End: 2022-07-20
Payer: MEDICARE

## 2022-07-20 ENCOUNTER — OFFICE VISIT (OUTPATIENT)
Dept: HEMATOLOGY/ONCOLOGY | Facility: CLINIC | Age: 67
End: 2022-07-20
Payer: MEDICARE

## 2022-07-20 VITALS
SYSTOLIC BLOOD PRESSURE: 146 MMHG | HEART RATE: 69 BPM | OXYGEN SATURATION: 100 % | HEIGHT: 69 IN | BODY MASS INDEX: 35.84 KG/M2 | DIASTOLIC BLOOD PRESSURE: 93 MMHG | TEMPERATURE: 99 F | WEIGHT: 242 LBS | RESPIRATION RATE: 20 BRPM

## 2022-07-20 DIAGNOSIS — C7A.8 NEUROENDOCRINE CARCINOMA OF SMALL BOWEL: ICD-10-CM

## 2022-07-20 DIAGNOSIS — R91.1 NODULE OF LEFT LUNG: ICD-10-CM

## 2022-07-20 DIAGNOSIS — R91.1 NODULE OF LEFT LUNG: Primary | ICD-10-CM

## 2022-07-20 DIAGNOSIS — C7A.8 NEUROENDOCRINE CARCINOMA OF SMALL BOWEL: Primary | ICD-10-CM

## 2022-07-20 PROCEDURE — 99214 OFFICE O/P EST MOD 30 MIN: CPT | Mod: PBBFAC | Performed by: INTERNAL MEDICINE

## 2022-07-20 PROCEDURE — 99214 PR OFFICE/OUTPT VISIT, EST, LEVL IV, 30-39 MIN: ICD-10-PCS | Mod: S$PBB,,, | Performed by: INTERNAL MEDICINE

## 2022-07-20 PROCEDURE — 99214 OFFICE O/P EST MOD 30 MIN: CPT | Mod: S$PBB,,, | Performed by: INTERNAL MEDICINE

## 2022-07-20 NOTE — TELEPHONE ENCOUNTER
Orders for today:    In 1 year, July 2023, follow-up with history and physical, multiphasic CT scans of A/P with contrast, contrast enhanced chest CT, CBC, and CMP

## 2022-07-20 NOTE — PROGRESS NOTES
Past medical history: Atrial fibrillation.  Coronary artery disease.  History of cardiac arrest with ventricular fibrillation.  Hypertension.  Dyslipidemia.  MI in 2003.  Obesity.  Status post CPR 03/08/2018. 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 HFpEF (EF >55% on echo 11/2018).  History of second-degree AV block requiring PPM.    Social history: Single.  Has 9 children.  Lives in [place].  Used to work as a  at [employer].  Smoked up to 5 packs of cigarettes daily for 10-20 years; quit in 1970s.  Used to drink vodka every weekend until he got drunk; drank for about 35 years, then quit.  Used to smoke marijuana.    Family history: Negative for malignancy.    Health maintenance: He is unsure when he had last colonoscopy performed, probably 10 years back, at [facility], apparently unremarkable.      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     # pT3,4 pN2 MX, at least stage III, well-differentiated neuroendocrine tumor of small bowel, multifocal, grade 1; large mesenteric mass (3.8 cm); 2:21 lymph nodes involved; small multifocal areas of tumor in the efren-intestinal adipose tissue.   Presented with small bowel obstruction.  Status post resection of 127 cm of small bowel and mesenteric mass in the base of the mesentery, on 11/02/2018.   -Our request for contrast-enhanced chest CT for staging, was denied   -12/26/2018: CT A/P with contrast: 25 mm area of mesenteric soft tissue may reflect residual mass or postsurgical change; 2 millimetric hepatic lesions are too small to characterize, stable from October 2018, suggest close attention on follow-up.   -12/27/2018: Colonoscopy: 3 mm hyperplastic sigmoid polyp; no malignancy   -12/10/2018: Chromogranin A level normal   -12/12/2018: 24-hour urine 5-HIAA level  normal   -02/15/2019: Whole-body Ga-dotatate PET/CT: No findings to suggest somatostatin receptor avid disease   -05/26/2019-05/29/2019: Brief hospitalization with early small bowel obstruction, managed conservatively   -05/28/2019: Small bowel follow-through: Prompt passage of contrast through the small bowel.  Contrast passed quickly through small bowel and reached colon within 15 minutes; further contrast progression to rectum within 1 hour.  No discrete small bowel transition point.   -06/28/2019: Octreotide scan: No evidence of neuroendocrine tumor   -07/11/2019: X-ray abdomen flat and erect (abdominal pain): No acute intra-abdominal abnormality  -11/01/2019: Multiphasic contrast-enhanced CTs abdomen and pelvis and chest CT with contrast for surveillance: No evidence of residual, recurrent, or metastatic disease in chest, abdomen, or pelvis  -02/03/2020: TTE: LVEF 55%; atrial fibrillation  -Follows up with cardiology for history of hypertension, dyslipidemia, CAD, history of NSTEMI in 2003, paroxysmal atrial fibrillation, HFpEF, second-degree AV block s/p [facility] PPM 11/13/2017 (upgraded to dual-chamber ICD 05/2018 secondary to V. fib arrest in 03/2018 in the setting of prolonged QT and hypokalemia)  -05/03/2021: Surveillance CT C/A/P with contrast (comparison: 02/02/2020): No new suspicious findings in C/A/P; decreased size of previously discussed mediastinal lymph nodes (AP window lymph nodes 6 mm, previously 8 mm)  -01/07/2022: CT A/P with contrast (comparison: 05/03/2021): Hepatic steatosis; small gallstones; retroperitoneal mildly enlarged lymph node, unchanged  -07/07/2022:  Surveillance CTs C/A/P with contrast (comparison:  CT A/P 01/07/2022; CT C/A/P 05/03/2021):  No recurrence or metastasis       # History of coronary artery disease, atrial fibrillation, history of cardiac arrest with ventricular fibrillation, MI in 2003, history of previous DVTs, status post CPR 03/08/2018, on anticoagulation for  history of DVTs and atrial fibrillation, history of second-degree AV block requiring PPM.      Interval history:  12/10/2018:   Presents for initial oncology consultation, accompanied by his 3 sisters.  Overall, doing well except for postoperative abdominal pain, 4 on a scale of 1-10, which keeps improving.  Mild fatigue.  No symptoms of carcinoid syndrome like flushing, diarrhea, or bronchoconstriction.  No weakness, fatigue, malaise, fevers, chills, anorexia, unintentional weight loss, nausea, vomiting, hematemesis, melena, hematochezia, etc.      11/07/2019:   -11/01/2019: Multiphasic contrast-enhanced CTs abdomen and pelvis and chest CT with contrast for surveillance: No evidence of residual, recurrent, or metastatic disease in chest, abdomen, or pelvis   -11/07/2019: CMP essentially within acceptable limits.  CBC completely normal.   Presents for follow-up visit.  Apart from occasional, intermittent abdominal pains without nausea, vomiting, or GI bleeding, reports no new symptoms.  Appetite.  Bowels moving normally.  No fevers or chills.  No symptoms of carcinoid syndrome.    07/20/2022:  -02/03/2020: TTE: LVEF 55%; atrial fibrillation  -Follows up with cardiology for history of hypertension, dyslipidemia, CAD, history of NSTEMI in 2003, paroxysmal atrial fibrillation, HFpEF, second-degree AV block s/p [facility] PPM 11/13/2017 (upgraded to dual-chamber ICD 05/2018 secondary to V. fib arrest in 03/2018 in the setting of prolonged QT and hypokalemia)  -05/03/2021: Surveillance CT C/A/P with contrast (comparison: 02/02/2020): No new suspicious findings in C/A/P; decreased size of previously discussed mediastinal lymph nodes (AP window lymph nodes 6 mm, previously 8 mm)  -01/07/2022: CT A/P with contrast (comparison: 05/03/2021): Hepatic steatosis; small gallstones; retroperitoneal mildly enlarged lymph node, unchanged  -07/07/2022:  Surveillance CTs C/A/P with contrast (comparison:  CT A/P 01/07/2022; CT C/A/P  05/03/2021):  No recurrence or metastasis  -07/20/2022:  Labs reviewed; CBC unremarkable and; creatinine 1.39, somewhat elevated; rest of CMP essentially unremarkable  Presents for a follow-up visit.  Doing well.  No complaints.  Good appetite.  No weakness or fatigue.  No chest pain, cough, or dyspnea.  Does not smoke.  He smoked up to 5 packs of cigarettes daily for 10-20 years, quit in 1970s.  Denies hemoptysis.  We will repeat a chest CT in 6 months for follow-up of lung nodule.      Review of systems:   All systems reviewed, and found to be negative except for symptoms detailed above.  No unusual headaches, loss of consciousness, seizures, strokelike symptoms, chest pains, dyspnea, cough, hemoptysis, etc.      Physical examination:   VITAL SIGNS:  Reviewed.       GENERAL:  In no apparent distress.    HEAD:  No signs of head trauma.   EYES:  Pupils are equal.  Extraocular motions intact.    EARS:  Hearing grossly intact.   MOUTH:  Oropharynx is normal.   NECK:  No adenopathy, no JVD.     CHEST:  Chest with clear breath sounds bilaterally.  No wheezes, rales, or rhonchi.    CARDIAC:  Regular rate and rhythm.  S1 and S2, without murmurs, gallops, or rubs.   VASCULAR:  No Edema.  Peripheral pulses normal and equal in all extremities.   ABDOMEN:  Soft, without detectable tenderness.  No sign of distention.  No   rebound or guarding, and no masses palpated.   Bowel Sounds normal.  Midline surgical scar has healed well.   MUSCULOSKELETAL:  Good range of motion of all major joints. Extremities without clubbing, cyanosis or edema.    NEUROLOGIC EXAM:  Alert and oriented x 3.  No focal sensory or strength deficits.   Speech normal.  Follows commands.   PSYCHIATRIC:  Mood normal.   SKIN:  No rash or lesions.      Assessment:  To summarize:   pT3,4 pN2 MX, at least stage III, well-differentiated neuroendocrine tumor of small bowel, multifocal, grade 1; large mesenteric mass (3.8 cm); 2:21 lymph nodes involved; small  multifocal areas of tumor in the efren-intestinal adipose tissue.   Presented with small bowel obstruction.  Status post resection of 127 cm of small bowel and mesenteric mass in the base of the mesentery, on 11/02/2018.   -Colonoscopy (12/27/2018: 3 mm hyperplastic sigmoid polyp   -No somatostatin receptor avid disease on whole-body gallium dotatate PET/CT (02/15/2019)   -No evidence of neuroendocrine tumor on octreotide scan (06/28/2019)   -No evidence of disease (surveillance CTs C/A/P 11/01/2019)  -no recurrence or metastasis on surveillance CTs 07/07/2022      # left lung nodule:  -07/07/2022:  CTs C/A/P with contrast (comparison:  05/03/2021): A 4.5 mm ground-glass nodule along the left major fissure (2, 23) was not seen previously.  No lobar consolidation      # History of coronary artery disease, atrial fibrillation, history of cardiac arrest with ventricular fibrillation, MI in 2003, history of previous DVTs, status post CPR 03/08/2018, on anticoagulation for history of DVTs and atrial fibrillation, history of second-degree AV block requiring PPM.      Plan:  -S/P resection of small bowel and mesenteric mass 11/02/2018  -continue surveillance  -> From 1 year post resection - 10 years (11/2019-11/2028), every 12-24 months:  History and physical, consider biochemical markers, consider abdominal +/-pelvic multiphasic CT or MRI with contrast, consider CT chest without contrast  -JESSIE as of 07/07/2022  >>>  -in 1 year, 07/2023, follow-up with history and physical, multiphasic CT scans of A/P with contrast, and contrast enhanced chest CT; earlier, if any symptoms of concern in the interim    -07/07/2022:  CTs C/A/P with contrast (comparison:  05/03/2021): A 4.5 mm ground-glass nodule along the left major fissure (2, 23) was not seen previously.  No lobar consolidation  >>>  -will repeat noncontrast chest CT in 3 months (October) for follow-up    Follow-up in 3 months, with repeat noncontrast chest CT for follow-up  of lung nodule.    Above discussed with him.  All questions answered.  Discussed labs and scans and gave him copies of reports.  He understands and agrees with this plan.  ----------------------    Surveillance:   Underwent resection on 11/02/2018   1.  3-12 months post resection (until 11/2019):   -History and physical   -Consider biochemical markers as clinically indicated   -Abdominal with or without pelvic multiphasic CT or MRI with IV contrast, as clinically indicated   -Chest CT with and without contrast for primary lung/thymus tumors (as clinically indicated for primary GI tumors)     2.  >1-year post resection to 10 years:   Every 12-24 months:   -History and physical   -Consider biochemical markers as clinically indicated   -Consider abdominal with or without pelvic multiphasic CT or MRI with contrast   -Consider chest CT with and without contrast for primary lung/thymus tumors (as clinically indicated for primary GI tumors)     3. >10 years:   Consider surveillance as clinically indicated

## 2022-08-16 ENCOUNTER — CLINICAL SUPPORT (OUTPATIENT)
Dept: CARDIOLOGY | Facility: CLINIC | Age: 67
End: 2022-08-16
Payer: MEDICARE

## 2022-08-16 DIAGNOSIS — I48.91 ATRIAL FIBRILLATION, UNSPECIFIED TYPE: ICD-10-CM

## 2022-08-16 PROCEDURE — 93283 PRGRMG EVAL IMPLANTABLE DFB: CPT | Mod: PBBFAC

## 2022-09-06 ENCOUNTER — HOSPITAL ENCOUNTER (EMERGENCY)
Facility: HOSPITAL | Age: 67
Discharge: HOME | End: 2022-09-06
Attending: FAMILY MEDICINE
Payer: MEDICARE

## 2022-09-06 VITALS
BODY MASS INDEX: 35.72 KG/M2 | HEIGHT: 69 IN | OXYGEN SATURATION: 99 % | DIASTOLIC BLOOD PRESSURE: 112 MMHG | RESPIRATION RATE: 16 BRPM | TEMPERATURE: 98 F | SYSTOLIC BLOOD PRESSURE: 161 MMHG | HEART RATE: 92 BPM | WEIGHT: 241.19 LBS

## 2022-09-06 DIAGNOSIS — G89.29 CHRONIC LOW BACK PAIN WITH BILATERAL SCIATICA, UNSPECIFIED BACK PAIN LATERALITY: Primary | ICD-10-CM

## 2022-09-06 DIAGNOSIS — M54.42 CHRONIC LOW BACK PAIN WITH BILATERAL SCIATICA, UNSPECIFIED BACK PAIN LATERALITY: Primary | ICD-10-CM

## 2022-09-06 DIAGNOSIS — I10 ASYMPTOMATIC HYPERTENSION: ICD-10-CM

## 2022-09-06 DIAGNOSIS — M54.41 CHRONIC LOW BACK PAIN WITH BILATERAL SCIATICA, UNSPECIFIED BACK PAIN LATERALITY: Primary | ICD-10-CM

## 2022-09-06 PROCEDURE — 25000003 PHARM REV CODE 250: Performed by: PHYSICIAN ASSISTANT

## 2022-09-06 PROCEDURE — 99283 EMERGENCY DEPT VISIT LOW MDM: CPT | Mod: 25

## 2022-09-06 RX ORDER — HYDROCODONE BITARTRATE AND ACETAMINOPHEN 5; 325 MG/1; MG/1
1 TABLET ORAL
Status: COMPLETED | OUTPATIENT
Start: 2022-09-06 | End: 2022-09-06

## 2022-09-06 RX ORDER — METOPROLOL SUCCINATE 25 MG/1
50 TABLET, EXTENDED RELEASE ORAL ONCE
Status: COMPLETED | OUTPATIENT
Start: 2022-09-06 | End: 2022-09-06

## 2022-09-06 RX ORDER — METHOCARBAMOL 500 MG/1
500 TABLET, FILM COATED ORAL 3 TIMES DAILY
Qty: 15 TABLET | Refills: 0 | Status: SHIPPED | OUTPATIENT
Start: 2022-09-06 | End: 2022-09-11

## 2022-09-06 RX ORDER — LOSARTAN POTASSIUM 25 MG/1
25 TABLET ORAL ONCE
Status: COMPLETED | OUTPATIENT
Start: 2022-09-06 | End: 2022-09-06

## 2022-09-06 RX ADMIN — METOPROLOL SUCCINATE 50 MG: 25 TABLET, EXTENDED RELEASE ORAL at 08:09

## 2022-09-06 RX ADMIN — HYDROCODONE BITARTRATE AND ACETAMINOPHEN 1 TABLET: 5; 325 TABLET ORAL at 08:09

## 2022-09-06 RX ADMIN — LOSARTAN POTASSIUM 25 MG: 25 TABLET, FILM COATED ORAL at 08:09

## 2022-09-06 NOTE — ED PROVIDER NOTES
"Encounter Date: 9/6/2022       History     Chief Complaint   Patient presents with    Back Pain     Lower back pain this morning; reports lying in bed all day yesterday. Hypertensive; did not take his meds this am due to "rushing to the ER"     Patient reports tot the ER with exacerbation of chronic back pain; pt denies any injury or fall; pt also reports not taking his blood pressure meds this morning in a rush to make it to the hospital cafeteria for breakfast - pt is eating from his tray - pt denies cp/sob    The history is provided by the patient.   Back Pain   This is a recurrent problem. The current episode started yesterday. The problem occurs throughout the day. The problem has been unchanged. The pain is associated with no known injury. The pain is present in the lumbar spine. The quality of the pain is described as aching. The pain radiates to the right leg and left leg. The pain is at a severity of 5/10. The symptoms are aggravated by bending and twisting. The pain is The same all the time. Associated symptoms include leg pain. Pertinent negatives include no chest pain, no fever, no weight loss, no abdominal pain, no bowel incontinence, no perianal numbness, no dysuria, no paresthesias and no weakness. He has tried bed rest for the symptoms. The treatment provided mild relief.   Review of patient's allergies indicates:  No Known Allergies  Past Medical History:   Diagnosis Date    Atrial fibrillation     BPH (benign prostatic hyperplasia)     Cardiac arrest     Coronary artery disease     Cyst, kidney, acquired     Diverticulosis     Hyperlipidemia     Hypertension     MI (myocardial infarction)     Obesity     Steatosis of liver      Past Surgical History:   Procedure Laterality Date    CARDIAC CATHETERIZATION      COLONOSCOPY W/ BIOPSIES      excision of colon       Family History   Problem Relation Age of Onset    Hypertension Mother     Hypertension Father     Hypertension Sister      Social History "     Tobacco Use    Smoking status: Former    Smokeless tobacco: Never   Substance Use Topics    Alcohol use: Not Currently    Drug use: Not Currently     Review of Systems   Constitutional:  Negative for fever and weight loss.   HENT:  Negative for sore throat.    Eyes: Negative.    Respiratory:  Negative for shortness of breath.    Cardiovascular:  Negative for chest pain.   Gastrointestinal:  Negative for abdominal pain, bowel incontinence and nausea.   Genitourinary:  Negative for dysuria.   Musculoskeletal:  Positive for back pain.   Skin:  Negative for rash.   Neurological:  Negative for weakness and paresthesias.   Hematological:  Does not bruise/bleed easily.   Psychiatric/Behavioral: Negative.       Physical Exam     Initial Vitals [09/06/22 0757]   BP Pulse Resp Temp SpO2   (!) 169/111 78 16 98.2 °F (36.8 °C) 100 %      MAP       --         Physical Exam    Constitutional: He appears well-developed.   HENT:   Head: Normocephalic and atraumatic.   Eyes: Conjunctivae and EOM are normal. Pupils are equal, round, and reactive to light.   Neck:   Normal range of motion.  Cardiovascular:  Normal rate, regular rhythm and normal heart sounds.           Pulmonary/Chest: Breath sounds normal. He exhibits no tenderness.   Abdominal: Abdomen is soft. Bowel sounds are normal. He exhibits no distension. There is no abdominal tenderness.   Musculoskeletal:      Cervical back: Normal and normal range of motion.      Thoracic back: Normal.      Lumbar back: Tenderness present. No swelling or deformity. Decreased range of motion.        Back:       Comments: Patient reports pain is worse with movement; pt is ambulatory on his own accord     Neurological: He is alert and oriented to person, place, and time. He displays normal reflexes. No cranial nerve deficit or sensory deficit. GCS score is 15. GCS eye subscore is 4. GCS verbal subscore is 5. GCS motor subscore is 6.   Skin: Skin is warm. No pallor.   Psychiatric: He has a  normal mood and affect. His behavior is normal. Judgment and thought content normal.       ED Course   Procedures  Labs Reviewed - No data to display       Imaging Results              X-Ray Lumbar Spine Ap And Lateral (Final result)  Result time 09/06/22 09:16:22      Final result by Polo Daniel MD (09/06/22 09:16:22)                   Impression:      No acute radiographic findings appreciated.  Mild multilevel degenerative changes.      Electronically signed by: Polo Daniel  Date:    09/06/2022  Time:    09:16               Narrative:    EXAMINATION:  XR LUMBAR SPINE AP AND LATERAL    CLINICAL HISTORY:  lower back pain;    TECHNIQUE:  Frontal and lateral radiographs of the lumbar spine    COMPARISON:  Radiography 10/06/2021    FINDINGS:  For the purposes of this report, there are 5 lumbar vertebral bodies. Vertebral body heights are maintained.  No significant listhesis.  Mild multilevel osteophytosis and disc space narrowing.                                       Medications   losartan tablet 25 mg (25 mg Oral Given 9/6/22 0857)   metoprolol succinate (TOPROL-XL) 24 hr tablet 50 mg (50 mg Oral Given 9/6/22 0858)   HYDROcodone-acetaminophen 5-325 mg per tablet 1 tablet (1 tablet Oral Given 9/6/22 0857)                 ED Course as of 09/06/22 1025   Tue Sep 06, 2022   1021 Patient reports decreased pain in his lower back [AL]      ED Course User Index  [AL] KAVIN Vences             Clinical Impression:   Final diagnoses:  [M54.41, G89.29, M54.42] Chronic low back pain with bilateral sciatica, unspecified back pain laterality (Primary)  [I10] Asymptomatic hypertension      ED Disposition Condition    Discharge Stable          ED Prescriptions    None       Follow-up Information       Follow up With Specialties Details Why Contact Info    discharge followup    If your symptoms become WORSE or you DO NOT IMPROVE and you are unable to reach your health care provider, you should RETURN to the emergency  department    discharge info    Discussed all pertinent ED information, results, diagnosis and treatment plan; All questions and concerns were addressed at this time. Patient voices understanding of information and instructions. Patient is comfortable with plan and discharge             KAVIN Vences  09/06/22 1025

## 2022-09-07 ENCOUNTER — HOSPITAL ENCOUNTER (EMERGENCY)
Facility: HOSPITAL | Age: 67
Discharge: HOME | End: 2022-09-07
Attending: STUDENT IN AN ORGANIZED HEALTH CARE EDUCATION/TRAINING PROGRAM
Payer: MEDICARE

## 2022-09-07 VITALS
WEIGHT: 240 LBS | SYSTOLIC BLOOD PRESSURE: 160 MMHG | RESPIRATION RATE: 16 BRPM | TEMPERATURE: 98 F | HEIGHT: 69 IN | DIASTOLIC BLOOD PRESSURE: 116 MMHG | OXYGEN SATURATION: 98 % | HEART RATE: 84 BPM | BODY MASS INDEX: 35.55 KG/M2

## 2022-09-07 DIAGNOSIS — I10 HTN (HYPERTENSION): ICD-10-CM

## 2022-09-07 DIAGNOSIS — R10.84 GENERALIZED ABDOMINAL PAIN: Primary | ICD-10-CM

## 2022-09-07 LAB
ALBUMIN SERPL-MCNC: 3.7 GM/DL (ref 3.4–4.8)
ALBUMIN/GLOB SERPL: 1.2 RATIO (ref 1.1–2)
ALP SERPL-CCNC: 67 UNIT/L (ref 40–150)
ALT SERPL-CCNC: 16 UNIT/L (ref 0–55)
AMYLASE SERPL-CCNC: 73 UNIT/L (ref 25–125)
AST SERPL-CCNC: 17 UNIT/L (ref 5–34)
BASOPHILS # BLD AUTO: 0.05 X10(3)/MCL (ref 0–0.2)
BASOPHILS NFR BLD AUTO: 0.8 %
BILIRUBIN DIRECT+TOT PNL SERPL-MCNC: 1.5 MG/DL
BUN SERPL-MCNC: 14.7 MG/DL (ref 8.4–25.7)
CALCIUM SERPL-MCNC: 9.1 MG/DL (ref 8.8–10)
CHLORIDE SERPL-SCNC: 105 MMOL/L (ref 98–107)
CO2 SERPL-SCNC: 26 MMOL/L (ref 23–31)
CREAT SERPL-MCNC: 1.02 MG/DL (ref 0.73–1.18)
EOSINOPHIL # BLD AUTO: 0.07 X10(3)/MCL (ref 0–0.9)
EOSINOPHIL NFR BLD AUTO: 1.2 %
ERYTHROCYTE [DISTWIDTH] IN BLOOD BY AUTOMATED COUNT: 13.4 % (ref 11.5–17)
GFR SERPLBLD CREATININE-BSD FMLA CKD-EPI: >60 MLS/MIN/1.73/M2
GLOBULIN SER-MCNC: 3.1 GM/DL (ref 2.4–3.5)
GLUCOSE SERPL-MCNC: 97 MG/DL (ref 82–115)
HCT VFR BLD AUTO: 42.8 % (ref 42–52)
HGB BLD-MCNC: 14.2 GM/DL (ref 14–18)
IMM GRANULOCYTES # BLD AUTO: 0.03 X10(3)/MCL (ref 0–0.04)
IMM GRANULOCYTES NFR BLD AUTO: 0.5 %
LIPASE SERPL-CCNC: 33 U/L
LYMPHOCYTES # BLD AUTO: 1.94 X10(3)/MCL (ref 0.6–4.6)
LYMPHOCYTES NFR BLD AUTO: 32.7 %
MCH RBC QN AUTO: 29.6 PG (ref 27–31)
MCHC RBC AUTO-ENTMCNC: 33.2 MG/DL (ref 33–36)
MCV RBC AUTO: 89.4 FL (ref 80–94)
MONOCYTES # BLD AUTO: 0.7 X10(3)/MCL (ref 0.1–1.3)
MONOCYTES NFR BLD AUTO: 11.8 %
NEUTROPHILS # BLD AUTO: 3.1 X10(3)/MCL (ref 2.1–9.2)
NEUTROPHILS NFR BLD AUTO: 53 %
NRBC BLD AUTO-RTO: 0 %
PLATELET # BLD AUTO: 195 X10(3)/MCL (ref 130–400)
PMV BLD AUTO: 10.5 FL (ref 7.4–10.4)
POTASSIUM SERPL-SCNC: 3.4 MMOL/L (ref 3.5–5.1)
PROT SERPL-MCNC: 6.8 GM/DL (ref 5.8–7.6)
RBC # BLD AUTO: 4.79 X10(6)/MCL (ref 4.7–6.1)
SODIUM SERPL-SCNC: 139 MMOL/L (ref 136–145)
TROPONIN I SERPL-MCNC: <0.01 NG/ML (ref 0–0.04)
TROPONIN I SERPL-MCNC: <0.01 NG/ML (ref 0–0.04)
WBC # SPEC AUTO: 5.9 X10(3)/MCL (ref 4.5–11.5)

## 2022-09-07 PROCEDURE — 99285 EMERGENCY DEPT VISIT HI MDM: CPT | Mod: 25

## 2022-09-07 PROCEDURE — 83690 ASSAY OF LIPASE: CPT | Performed by: PHYSICIAN ASSISTANT

## 2022-09-07 PROCEDURE — 96374 THER/PROPH/DIAG INJ IV PUSH: CPT | Mod: 59

## 2022-09-07 PROCEDURE — 85610 PROTHROMBIN TIME: CPT | Performed by: PHYSICIAN ASSISTANT

## 2022-09-07 PROCEDURE — 63600175 PHARM REV CODE 636 W HCPCS: Performed by: PHYSICIAN ASSISTANT

## 2022-09-07 PROCEDURE — 80053 COMPREHEN METABOLIC PANEL: CPT | Performed by: PHYSICIAN ASSISTANT

## 2022-09-07 PROCEDURE — 85025 COMPLETE CBC W/AUTO DIFF WBC: CPT | Performed by: PHYSICIAN ASSISTANT

## 2022-09-07 PROCEDURE — 36415 COLL VENOUS BLD VENIPUNCTURE: CPT | Performed by: PHYSICIAN ASSISTANT

## 2022-09-07 PROCEDURE — 93005 ELECTROCARDIOGRAM TRACING: CPT

## 2022-09-07 PROCEDURE — 25500020 PHARM REV CODE 255: Performed by: PHYSICIAN ASSISTANT

## 2022-09-07 PROCEDURE — 82150 ASSAY OF AMYLASE: CPT | Performed by: PHYSICIAN ASSISTANT

## 2022-09-07 PROCEDURE — 84484 ASSAY OF TROPONIN QUANT: CPT | Performed by: PHYSICIAN ASSISTANT

## 2022-09-07 RX ORDER — MORPHINE SULFATE 2 MG/ML
4 INJECTION, SOLUTION INTRAMUSCULAR; INTRAVENOUS
Status: COMPLETED | OUTPATIENT
Start: 2022-09-07 | End: 2022-09-07

## 2022-09-07 RX ADMIN — MORPHINE SULFATE 4 MG: 2 INJECTION, SOLUTION INTRAMUSCULAR; INTRAVENOUS at 04:09

## 2022-09-07 RX ADMIN — IOHEXOL 100 ML: 350 INJECTION, SOLUTION INTRAVENOUS at 05:09

## 2022-09-07 NOTE — ED PROVIDER NOTES
Encounter Date: 9/7/2022       History     Chief Complaint   Patient presents with    Abdominal Pain     Via [facility]. Reports generalized abd pain immediately PTA. Denies N/V/D. Last BM PTA. Declined IV with [facility].      Patient reports generalized abdominal pain that started approx x1 hour prior to arrival after he ate breakfast and was at the store grocery shopping; pt denies nausea/vomiting    The history is provided by the patient.   Abdominal Pain  The current episode started 1 to 2 hours ago. The problem has been gradually improving. The abdominal pain is generalized. The severity of the abdominal pain is 3/10. The abdominal pain is exacerbated by eating. The other symptoms of the illness include nausea. The other symptoms of the illness do not include fever, fatigue, jaundice, shortness of breath or dysuria.   Nausea began more than 1 week ago. The nausea is exacerbated by food.   Symptoms associated with the illness do not include chills, diaphoresis, constipation, hematuria or back pain. Significant associated medical issues include cardiac disease. Significant associated medical issues do not include liver disease, substance abuse or HIV.   Review of patient's allergies indicates:  No Known Allergies  Past Medical History:   Diagnosis Date    Atrial fibrillation     BPH (benign prostatic hyperplasia)     Cardiac arrest     Coronary artery disease     Cyst, kidney, acquired     Diverticulosis     Hyperlipidemia     Hypertension     MI (myocardial infarction)     Obesity     Steatosis of liver      Past Surgical History:   Procedure Laterality Date    CARDIAC CATHETERIZATION      COLONOSCOPY W/ BIOPSIES      excision of colon       Family History   Problem Relation Age of Onset    Hypertension Mother     Hypertension Father     Hypertension Sister      Social History     Tobacco Use    Smoking status: Former    Smokeless tobacco: Never   Substance Use Topics    Alcohol use: Not Currently     Drug use: Not Currently     Review of Systems   Constitutional:  Negative for chills, diaphoresis, fatigue and fever.   HENT:  Negative for sore throat.    Respiratory:  Negative for shortness of breath.    Cardiovascular:  Negative for chest pain.   Gastrointestinal:  Positive for abdominal pain and nausea. Negative for constipation and jaundice.   Genitourinary:  Negative for dysuria and hematuria.   Musculoskeletal:  Negative for back pain.   Skin:  Negative for rash.   Neurological:  Negative for weakness.   Hematological:  Does not bruise/bleed easily.   Psychiatric/Behavioral: Negative.       Physical Exam     Initial Vitals [09/07/22 1131]   BP Pulse Resp Temp SpO2   (!) 181/123 83 18 98.2 °F (36.8 °C) 100 %      MAP       --         Physical Exam    Vitals reviewed.  Constitutional: He appears well-developed.   HENT:   Head: Normocephalic and atraumatic.   Eyes: Conjunctivae and EOM are normal. Pupils are equal, round, and reactive to light.   Neck:   Normal range of motion.  Cardiovascular:  Normal rate, regular rhythm and normal heart sounds.           Pulmonary/Chest: Breath sounds normal. He exhibits no tenderness.   Abdominal: Abdomen is soft. Bowel sounds are normal. He exhibits no distension. There is generalized abdominal tenderness. There is no rebound and no guarding.   Musculoskeletal:         General: Normal range of motion.      Cervical back: Normal range of motion.     Neurological: He is alert and oriented to person, place, and time. He displays normal reflexes. No cranial nerve deficit or sensory deficit. GCS score is 15. GCS eye subscore is 4. GCS verbal subscore is 5. GCS motor subscore is 6.   Skin: Skin is warm. No pallor.   Psychiatric: He has a normal mood and affect. His behavior is normal. Judgment and thought content normal.       ED Course   Procedures  Labs Reviewed   COMPREHENSIVE METABOLIC PANEL - Abnormal; Notable for the following components:       Result Value    Potassium  Level 3.4 (*)     All other components within normal limits   PROTIME-INR - Abnormal; Notable for the following components:    INR 2.08 (*)     All other components within normal limits   CBC WITH DIFFERENTIAL - Abnormal; Notable for the following components:    MPV 10.5 (*)     All other components within normal limits   TROPONIN I - Normal   LIPASE - Normal   AMYLASE - Normal   TROPONIN I - Normal   CBC W/ AUTO DIFFERENTIAL    Narrative:     The following orders were created for panel order CBC auto differential.  Procedure                               Abnormality         Status                     ---------                               -----------         ------                     CBC with Differential[[id number]]        Abnormal            Final result                 Please view results for these tests on the individual orders.   EXTRA TUBES    Narrative:     The following orders were created for panel order EXTRA TUBES.  Procedure                               Abnormality         Status                     ---------                               -----------         ------                     Gold Top Hold[[id number]]                                    In process                   Please view results for these tests on the individual orders.   GOLD TOP HOLD        ECG Results              EKG 12-lead (In process)  Result time 09/07/22 16:24:51      In process by Interface, Lab In [facility] (09/07/22 16:24:51)                   Narrative:    Test Reason : I10,    Vent. Rate : 079 BPM     Atrial Rate : 197 BPM     P-R Int : 000 ms          QRS Dur : 086 ms      QT Int : 400 ms       P-R-T Axes : 000 078 -67 degrees     QTc Int : 458 ms    Atrial fibrillation  ST and T wave abnormality, consider anterolateral ischemia  Abnormal ECG  When compared with ECG of 17-JUN-2022 00:37,  Atrial fibrillation has replaced Electronic ventricular pacemaker    Referred By: AAAREFERR   SELF           Confirmed By:                                    Imaging Results              CT Abdomen Pelvis With Contrast (Final result)  Result time 09/07/22 17:30:13      Final result by Polo Daniel MD (09/07/22 17:30:13)                   Impression:      No acute process identified.      Electronically signed by: Polo Daniel  Date:    09/07/2022  Time:    17:30               Narrative:    EXAMINATION:  CT ABDOMEN PELVIS WITH CONTRAST    CLINICAL HISTORY:  generalized abd pain;    TECHNIQUE:  CT imaging of the abdomen and pelvis after the administration of 100 mL of Omnipaque 350 intravenous contrast. Dose length product is 637 mGycm. Automatic exposure control, adjustment of mA/kV or iterative reconstruction technique used to limit radiation dose.    COMPARISON:  CT 07/07/2022    FINDINGS:  Liver/biliary: Mild generalized hepatic steatosis.  Small gallstone.  No biliary dilatation.    Pancreas: Normal.    Spleen: Normal.    Adrenals: Normal.    Genitourinary: Symmetric renal enhancement. No hydronephrosis. Bladder within normal limits. Mildly enlarged prostate.    Stomach/bowel: Right lower abdominal small bowel anastomosis.  No bowel obstruction.  Normal appendix. No definitive sites of bowel inflammation.    Lymph nodes: Stable 9 mm mesenteric lymph node near the small bowel anastomosis (series 3, image 46).    Peritoneum: No ascites or free air. No fluid collection.    Vasculature: Normal abdominal aortic caliber.    Abdominal wall: Normal.    Lung bases: Enlarged heart.  No consolidation or pleural effusion.    Musculoskeletal: No acute osseous findings.                                       X-Ray Chest 1 View (Final result)  Result time 09/07/22 16:00:48      Final result by Sami Taylor MD (09/07/22 16:00:48)                   Impression:      NO ACUTE CARDIOPULMONARY PROCESS IDENTIFIED.      Electronically signed by: Sami Taylor  Date:    09/07/2022  Time:    16:00               Narrative:    EXAMINATION:  XR CHEST 1 VIEW    CLINICAL  HISTORY:  Essential (primary) hypertension    TECHNIQUE:  One view    COMPARISON:  October 18, 2021    FINDINGS:  Cardiopericardial silhouette enlarged appearance is similar.  Right chest implanted cardiac device electrodes terminate within the right atrium and the right ventricle.  No acute dense focal or segmental consolidation, congestive process, pleural effusions or pneumothorax.                                       Medications   morphine injection 4 mg (4 mg Intravenous Given 9/7/22 1620)   iohexoL (OMNIPAQUE 350) injection 100 mL (100 mLs Intravenous Given 9/7/22 1715)           APC / Resident Notes:   Devyn consult me on this patient after return of all labs.  Agree with his assessment/plan as listed above.  We discussed this patient and their clinical course.  Patient has had multiple ED visits for different abdominal and back pains.  CT abdomen today negative acute.  Labs grossly unremarkable.  Patient's pain completely resolved in department.  Atraumatic history and no neuro deficits. Patient's average blood pressure over his last several visits were 160 systolic + so we increased his losartan. Requires close PCP f/u and given strict return precautions. D/c stable. (Zmora)      ED Course as of 09/08/22 0127   Wed Sep 07, 2022   1815 Patient reports complete resolution of pain - pt continues to deny cp/sob [AL]   1830 It appears the last x5 visits to the ER over the last x3 months the patient has had elevated blood pressures in the 160/110 range - will consider increase in BP regimen  [AL]   1902 Discussed with patient increasing of losartan to 100mg daily and returning to the ER in 48 hours for a recheck - pt given ER precautions to return to ER if any of symptoms return or blood pressure remains high [AL]      ED Course User Index  [AL] KAVIN Vences             Clinical Impression:   Final diagnoses:  [R10.84] Generalized abdominal pain (Primary)  [I10] HTN (hypertension)      ED Disposition  Condition    Discharge Stable          ED Prescriptions    None       Follow-up Information       Follow up With Specialties Details Why Contact Info    discharge followup    If your symptoms become WORSE or you DO NOT IMPROVE and you are unable to reach your health care provider, you should RETURN to the emergency department    discharge info    Discussed all pertinent ED information, results, diagnosis and treatment plan; All questions and concerns were addressed at this time. Patient voices understanding of information and instructions. Patient is comfortable with plan and discharge             KAVIN Vences  09/07/22 1906       KAVIN Vences  09/07/22 1907       Humberto Sewell MD  09/08/22 0132

## 2022-09-08 ENCOUNTER — HOSPITAL ENCOUNTER (EMERGENCY)
Facility: HOSPITAL | Age: 67
Discharge: HOME | End: 2022-09-08
Attending: FAMILY MEDICINE
Payer: MEDICARE

## 2022-09-08 VITALS
SYSTOLIC BLOOD PRESSURE: 154 MMHG | HEIGHT: 69 IN | TEMPERATURE: 98 F | WEIGHT: 240.31 LBS | BODY MASS INDEX: 35.59 KG/M2 | HEART RATE: 81 BPM | OXYGEN SATURATION: 99 % | RESPIRATION RATE: 16 BRPM | DIASTOLIC BLOOD PRESSURE: 106 MMHG

## 2022-09-08 DIAGNOSIS — R10.33 PERIUMBILICAL ABDOMINAL PAIN: Primary | ICD-10-CM

## 2022-09-08 LAB
ALBUMIN SERPL-MCNC: 3.8 GM/DL (ref 3.4–4.8)
ALBUMIN/GLOB SERPL: 1.2 RATIO (ref 1.1–2)
ALP SERPL-CCNC: 68 UNIT/L (ref 40–150)
ALT SERPL-CCNC: 17 UNIT/L (ref 0–55)
APPEARANCE UR: CLEAR
AST SERPL-CCNC: 18 UNIT/L (ref 5–34)
BACTERIA #/AREA URNS AUTO: ABNORMAL /HPF
BASOPHILS # BLD AUTO: 0.05 X10(3)/MCL (ref 0–0.2)
BASOPHILS NFR BLD AUTO: 0.9 %
BILIRUB UR QL STRIP.AUTO: NEGATIVE MG/DL
BILIRUBIN DIRECT+TOT PNL SERPL-MCNC: 1.9 MG/DL
BUN SERPL-MCNC: 12.9 MG/DL (ref 8.4–25.7)
CALCIUM SERPL-MCNC: 9.2 MG/DL (ref 8.8–10)
CHLORIDE SERPL-SCNC: 105 MMOL/L (ref 98–107)
CO2 SERPL-SCNC: 24 MMOL/L (ref 23–31)
COLOR UR AUTO: YELLOW
CREAT SERPL-MCNC: 1.11 MG/DL (ref 0.73–1.18)
EOSINOPHIL # BLD AUTO: 0.08 X10(3)/MCL (ref 0–0.9)
EOSINOPHIL NFR BLD AUTO: 1.4 %
ERYTHROCYTE [DISTWIDTH] IN BLOOD BY AUTOMATED COUNT: 13.5 % (ref 11.5–17)
GFR SERPLBLD CREATININE-BSD FMLA CKD-EPI: >60 MLS/MIN/1.73/M2
GLOBULIN SER-MCNC: 3.1 GM/DL (ref 2.4–3.5)
GLUCOSE SERPL-MCNC: 138 MG/DL (ref 82–115)
GLUCOSE UR QL STRIP.AUTO: NORMAL MG/DL
HCT VFR BLD AUTO: 43.4 % (ref 42–52)
HGB BLD-MCNC: 14.7 GM/DL (ref 14–18)
HYALINE CASTS #/AREA URNS LPF: ABNORMAL /LPF
IMM GRANULOCYTES # BLD AUTO: 0.02 X10(3)/MCL (ref 0–0.04)
IMM GRANULOCYTES NFR BLD AUTO: 0.4 %
KETONES UR QL STRIP.AUTO: NEGATIVE MG/DL
LEUKOCYTE ESTERASE UR QL STRIP.AUTO: NEGATIVE UNIT/L
LIPASE SERPL-CCNC: 32 U/L
LYMPHOCYTES # BLD AUTO: 1.62 X10(3)/MCL (ref 0.6–4.6)
LYMPHOCYTES NFR BLD AUTO: 28.8 %
MCH RBC QN AUTO: 30.1 PG (ref 27–31)
MCHC RBC AUTO-ENTMCNC: 33.9 MG/DL (ref 33–36)
MCV RBC AUTO: 88.9 FL (ref 80–94)
MONOCYTES # BLD AUTO: 0.53 X10(3)/MCL (ref 0.1–1.3)
MONOCYTES NFR BLD AUTO: 9.4 %
MUCOUS THREADS URNS QL MICRO: ABNORMAL /LPF
NEUTROPHILS # BLD AUTO: 3.3 X10(3)/MCL (ref 2.1–9.2)
NEUTROPHILS NFR BLD AUTO: 59.1 %
NITRITE UR QL STRIP.AUTO: NEGATIVE
NRBC BLD AUTO-RTO: 0 %
PH UR STRIP.AUTO: 6 [PH]
PLATELET # BLD AUTO: 179 X10(3)/MCL (ref 130–400)
PMV BLD AUTO: 10.5 FL (ref 7.4–10.4)
POTASSIUM SERPL-SCNC: 3.4 MMOL/L (ref 3.5–5.1)
PROT SERPL-MCNC: 6.9 GM/DL (ref 5.8–7.6)
PROT UR QL STRIP.AUTO: ABNORMAL MG/DL
RBC # BLD AUTO: 4.88 X10(6)/MCL (ref 4.7–6.1)
RBC #/AREA URNS AUTO: ABNORMAL /HPF
RBC UR QL AUTO: ABNORMAL UNIT/L
SODIUM SERPL-SCNC: 138 MMOL/L (ref 136–145)
SP GR UR STRIP.AUTO: 1.03
SQUAMOUS #/AREA URNS LPF: ABNORMAL /HPF
TROPONIN I SERPL-MCNC: 0.01 NG/ML (ref 0–0.04)
UROBILINOGEN UR STRIP-ACNC: NORMAL MG/DL
WBC # SPEC AUTO: 5.6 X10(3)/MCL (ref 4.5–11.5)
WBC #/AREA URNS AUTO: ABNORMAL /HPF

## 2022-09-08 PROCEDURE — 85025 COMPLETE CBC W/AUTO DIFF WBC: CPT | Performed by: PHYSICIAN ASSISTANT

## 2022-09-08 PROCEDURE — 96372 THER/PROPH/DIAG INJ SC/IM: CPT | Performed by: PHYSICIAN ASSISTANT

## 2022-09-08 PROCEDURE — 36415 COLL VENOUS BLD VENIPUNCTURE: CPT | Performed by: PHYSICIAN ASSISTANT

## 2022-09-08 PROCEDURE — 84484 ASSAY OF TROPONIN QUANT: CPT | Performed by: PHYSICIAN ASSISTANT

## 2022-09-08 PROCEDURE — 81001 URINALYSIS AUTO W/SCOPE: CPT | Performed by: PHYSICIAN ASSISTANT

## 2022-09-08 PROCEDURE — 63600175 PHARM REV CODE 636 W HCPCS: Performed by: PHYSICIAN ASSISTANT

## 2022-09-08 PROCEDURE — 93005 ELECTROCARDIOGRAM TRACING: CPT

## 2022-09-08 PROCEDURE — 83690 ASSAY OF LIPASE: CPT | Performed by: PHYSICIAN ASSISTANT

## 2022-09-08 PROCEDURE — 80053 COMPREHEN METABOLIC PANEL: CPT | Performed by: PHYSICIAN ASSISTANT

## 2022-09-08 PROCEDURE — 99284 EMERGENCY DEPT VISIT MOD MDM: CPT | Mod: 25

## 2022-09-08 RX ORDER — SUCRALFATE 1 G/1
1 TABLET ORAL
Qty: 40 TABLET | Refills: 0 | Status: SHIPPED | OUTPATIENT
Start: 2022-09-08 | End: 2022-09-18

## 2022-09-08 RX ORDER — DICYCLOMINE HYDROCHLORIDE 10 MG/ML
20 INJECTION INTRAMUSCULAR
Status: COMPLETED | OUTPATIENT
Start: 2022-09-08 | End: 2022-09-08

## 2022-09-08 RX ORDER — DICYCLOMINE HYDROCHLORIDE 20 MG/1
20 TABLET ORAL 4 TIMES DAILY PRN
Qty: 20 TABLET | Refills: 0 | OUTPATIENT
Start: 2022-09-08 | End: 2022-09-10

## 2022-09-08 RX ADMIN — DICYCLOMINE HYDROCHLORIDE 20 MG: 20 INJECTION INTRAMUSCULAR at 08:09

## 2022-09-08 NOTE — DISCHARGE INSTRUCTIONS
Continue taking omeprazole daily.     It is important that you follow up with your primary care provider or specialist if indicated for further evaluation, workup, and treatment as necessary. The exam and treatment you received in Emergency Department was for an urgent problem and NOT INTENDED AS COMPLETE CARE. It is important that you FOLLOW UP with a doctor for ongoing care. If your symptoms become WORSE or you DO NOT IMPROVE and you are unable to reach your health care provider, you should RETURN to the Emergency Department. The Emergency Department provider has provided a PRELIMINARY INTERPRETATION of all your studies. A final interpretation may be done after you are discharged. If a change in your diagnosis or treatment is needed WE WILL CONTACT YOU. It is critical that we have a CURRENT PHONE NUMBER FOR YOU.

## 2022-09-08 NOTE — ED PROVIDER NOTES
Encounter Date: 9/8/2022       History     Chief Complaint   Patient presents with    Abdominal Pain     PT W CO CONTINUED ABD PAIN X 2 DAYS.  NO NVD, SEEN YESTERDAY W SAME COMPLAINT.  NO RX GIVEN.      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 He was seen in the ED yesterday after pain started. Labs and CT scan were essentially unremarkable so he was discharged home. Patient says his pain resolved in the ED yesterday after getting morphine, but came back again last night. He denies any nausea, vomiting, diarrhea, or constipation. His last bm was this morning. Patient did come to the hospital this morning to eat breakfast and came back to the ED for evaluation due to continued pain. He denies fever, chills, cp, sob. Currently taking prilosec.     The history is provided by the patient. No  was used.   Review of patient's allergies indicates:  No Known Allergies  Past Medical History:   Diagnosis Date    Atrial fibrillation     BPH (benign prostatic hyperplasia)     Cardiac arrest     Coronary artery disease     Cyst, kidney, acquired     Diverticulosis     Hyperlipidemia     Hypertension     MI (myocardial infarction)     Obesity     Steatosis of liver      Past Surgical History:   Procedure Laterality Date    CARDIAC CATHETERIZATION      COLONOSCOPY W/ BIOPSIES      excision of colon       Family History   Problem Relation Age of Onset    Hypertension Mother     Hypertension Father     Hypertension Sister      Social History     Tobacco Use    Smoking status: Former    Smokeless tobacco: Never   Substance Use Topics    Alcohol use: Not Currently    Drug use: Not Currently     Review of Systems   Constitutional:  Negative for chills and fever.   Respiratory:  Negative for cough, chest tightness and shortness of breath.     Cardiovascular:  Negative for chest pain, palpitations and leg swelling.   Gastrointestinal:  Positive for abdominal pain. Negative for constipation, diarrhea, nausea and vomiting.   Genitourinary:  Negative for dysuria, flank pain and hematuria.   Musculoskeletal:  Negative for arthralgias and myalgias.   Neurological:  Negative for syncope, light-headedness and headaches.     Physical Exam     Initial Vitals [09/08/22 0824]   BP Pulse Resp Temp SpO2   (!) 157/103 85 16 97.9 °F (36.6 °C) 99 %      MAP       --         Physical Exam    Vitals reviewed.  Constitutional: He appears well-developed and well-nourished. He is not diaphoretic. No distress.   HENT:   Head: Normocephalic and atraumatic.   Mouth/Throat: Oropharynx is clear and moist.   Eyes: Conjunctivae and EOM are normal.   Neck: Neck supple.   Normal range of motion.  Cardiovascular:  Normal rate, regular rhythm, normal heart sounds and intact distal pulses.           Pulmonary/Chest: Breath sounds normal. No respiratory distress.   Abdominal: Abdomen is soft and protuberant. Bowel sounds are normal. He exhibits no distension. There is no abdominal tenderness.   No right CVA tenderness.  No left CVA tenderness. There is no rebound and no guarding.   Musculoskeletal:         General: No edema.      Cervical back: Normal range of motion and neck supple.     Neurological: He is alert and oriented to person, place, and time. GCS score is 15. GCS eye subscore is 4. GCS verbal subscore is 5. GCS motor subscore is 6.   Skin: Skin is warm and dry. Capillary refill takes less than 2 seconds.   Psychiatric: He has a normal mood and affect.       ED Course   Procedures  Labs Reviewed   COMPREHENSIVE METABOLIC PANEL - Abnormal; Notable for the following components:       Result Value    Potassium Level 3.4 (*)     Glucose Level 138 (*)     Bilirubin Total 1.9 (*)     All other components within normal limits   URINALYSIS, REFLEX TO URINE CULTURE - Abnormal; Notable  for the following components:    Protein, UA 1+ (*)     Blood, UA 1+ (*)     Mucous, UA Trace (*)     Hyaline Casts, UA 3-5 (*)     All other components within normal limits   CBC WITH DIFFERENTIAL - Abnormal; Notable for the following components:    MPV 10.5 (*)     All other components within normal limits   LIPASE - Normal   TROPONIN I - Normal   CBC W/ AUTO DIFFERENTIAL    Narrative:     The following orders were created for panel order CBC auto differential.  Procedure                               Abnormality         Status                     ---------                               -----------         ------                     CBC with Differential[[id number]]        Abnormal            Final result                 Please view results for these tests on the individual orders.   EXTRA TUBES    Narrative:     The following orders were created for panel order EXTRA TUBES.  Procedure                               Abnormality         Status                     ---------                               -----------         ------                     Light Blue Top Hold[[id number]]                              In process                   Please view results for these tests on the individual orders.   LIGHT BLUE TOP HOLD        ECG Results              EKG 12-lead (Final result)  Result time 09/12/22 17:38:27      Final result by Interface, Lab In [facility] (09/12/22 17:38:27)                   Narrative:    Test Reason : R10.9,    Vent. Rate : 086 BPM     Atrial Rate : 258 BPM     P-R Int : 000 ms          QRS Dur : 086 ms      QT Int : 372 ms       P-R-T Axes : 000 100 218 degrees     QTc Int : 445 ms    Atrial fibrillation  Rightward axis  Nonspecific ST and T wave abnormality  Abnormal ECG  When compared with ECG of 07-SEP-2022 16:19,  No significant change was found  Confirmed by Rico Celis MD ([id number]) on 9/12/2022 5:38:21 PM    Referred By: AAAREFERR   SELF           Confirmed By:Rico Celis MD                                      EKG 12-LEAD (Final result)  Result time 09/15/22 12:10:13      Final result by Unknown User (09/15/22 12:10:13)                                      Imaging Results    None          Medications   dicyclomine injection 20 mg (20 mg Intramuscular Given 22 0844)                          Clinical Impression:   Final diagnoses:  [R10.33] Periumbilical abdominal pain (Primary)      ED Disposition Condition    Discharge Stable          ED Prescriptions       Medication Sig Dispense Start Date End Date Auth. Provider    sucralfate (CARAFATE) 1 gram tablet () Take 1 tablet (1 g total) by mouth 4 (four) times daily with meals and nightly. for 10 days 40 tablet 2022 KAVIN Dunn    dicyclomine (BENTYL) 20 mg tablet () Take 1 tablet (20 mg total) by mouth 4 (four) times daily as needed (abdominal pain or cramping). 20 tablet 2022 9/10/2022 KAVIN Dunn          Follow-up Information       Follow up With Specialties Details Why Contact Info    [facility] - Emergency Dept Emergency Medicine  If symptoms worsen return to ED immediately [address] [facility] [address] [phone]    Primary Care Provider within 1-2 days  Go in 1 day               KAVIN Dunn  22 1032       KAVIN Dunn  22 1288

## 2022-09-09 ENCOUNTER — HOSPITAL ENCOUNTER (EMERGENCY)
Facility: HOSPITAL | Age: 67
Discharge: HOME | End: 2022-09-09
Attending: FAMILY MEDICINE
Payer: MEDICARE

## 2022-09-09 VITALS
BODY MASS INDEX: 33.53 KG/M2 | HEART RATE: 88 BPM | DIASTOLIC BLOOD PRESSURE: 98 MMHG | TEMPERATURE: 97 F | OXYGEN SATURATION: 100 % | SYSTOLIC BLOOD PRESSURE: 141 MMHG | RESPIRATION RATE: 18 BRPM | WEIGHT: 227.06 LBS

## 2022-09-09 DIAGNOSIS — I10 HYPERTENSION, UNSPECIFIED TYPE: Primary | ICD-10-CM

## 2022-09-09 PROCEDURE — 99284 EMERGENCY DEPT VISIT MOD MDM: CPT

## 2022-09-09 NOTE — ED PROVIDER NOTES
"Encounter Date: 9/9/2022       History     Chief Complaint   Patient presents with    Hypertension     Pt states he has no complaints. Told by MD yesterday to come to ER this am to have blood pressure taken. Pt states he took his BP med "a few minutes ago". NAD.        67-year-old gentleman presents emergency room to follow-up on a blood pressure reading.  Informed by his primary care physician presents emergency room for blood pressure check.  Patient denies chest pain.  Denies shortness of breath.  Patient was seen emergency room yesterday with complaints of abdominal pain which he says has resolved.  Patient currently feeling his normal state of health.    The history is provided by the patient.   Review of patient's allergies indicates:  No Known Allergies  Past Medical History:   Diagnosis Date    Atrial fibrillation     BPH (benign prostatic hyperplasia)     Cardiac arrest     Coronary artery disease     Cyst, kidney, acquired     Diverticulosis     Hyperlipidemia     Hypertension     MI (myocardial infarction)     Obesity     Steatosis of liver      Past Surgical History:   Procedure Laterality Date    CARDIAC CATHETERIZATION      COLONOSCOPY W/ BIOPSIES      excision of colon       Family History   Problem Relation Age of Onset    Hypertension Mother     Hypertension Father     Hypertension Sister      Social History     Tobacco Use    Smoking status: Former    Smokeless tobacco: Never   Substance Use Topics    Alcohol use: Not Currently    Drug use: Not Currently     Review of Systems   Constitutional:  Negative for chills, fatigue and fever.   HENT:  Negative for ear pain, rhinorrhea and sore throat.    Eyes:  Negative for photophobia and pain.   Respiratory:  Negative for cough, shortness of breath and wheezing.    Cardiovascular:  Negative for chest pain.   Gastrointestinal:  Negative for abdominal pain, diarrhea, nausea and vomiting.   Genitourinary:  Negative for dysuria.   Neurological:  Negative for " dizziness, weakness and headaches.   All other systems reviewed and are negative.    Physical Exam     Initial Vitals [09/09/22 0755]   BP Pulse Resp Temp SpO2   (!) 164/118 88 18 97.3 °F (36.3 °C) 100 %      MAP       --         Physical Exam    Nursing note and vitals reviewed.  Constitutional: He appears well-developed and well-nourished.   HENT:   Head: Normocephalic and atraumatic.   Eyes: EOM are normal. Pupils are equal, round, and reactive to light.   Neck: Neck supple.   Normal range of motion.  Cardiovascular:  Normal rate, regular rhythm and normal heart sounds.     Exam reveals no gallop and no friction rub.       No murmur heard.  Pulmonary/Chest: Breath sounds normal. No respiratory distress.   Abdominal: Abdomen is soft. Bowel sounds are normal. He exhibits no distension. There is no abdominal tenderness.   Musculoskeletal:         General: Normal range of motion.      Cervical back: Normal range of motion and neck supple.     Neurological: He is alert and oriented to person, place, and time. He has normal strength.   Skin: Skin is warm and dry.   Psychiatric: He has a normal mood and affect. His behavior is normal. Judgment and thought content normal.       ED Course   Procedures  Labs Reviewed - No data to display       Imaging Results    None          Medications - No data to display  Medical Decision Making:   Initial Assessment:   Patient currently in no acute distress.  On repeat blood pressure, significantly improved.  Stable for discharge home.  Reassurance given to the patient.  Informed continue taking his medication as prescribed.                    Clinical Impression:   Final diagnoses:  [I10] Hypertension, unspecified type (Primary)      ED Disposition Condition    Discharge Stable          ED Prescriptions    None       Follow-up Information       Follow up With Specialties Details Why Contact Info    [facility] Emergency Dept Emergency Medicine  As needed, If symptoms worsen  [address] [address]  [phone]    Primary Care Physician  In 5 days               Eduardo Hernandez MD  09/09/22 0836

## 2022-09-10 ENCOUNTER — HOSPITAL ENCOUNTER (EMERGENCY)
Facility: HOSPITAL | Age: 67
Discharge: HOME | End: 2022-09-10
Attending: FAMILY MEDICINE
Payer: MEDICARE

## 2022-09-10 VITALS
TEMPERATURE: 99 F | HEART RATE: 78 BPM | WEIGHT: 238.56 LBS | BODY MASS INDEX: 35.33 KG/M2 | OXYGEN SATURATION: 98 % | DIASTOLIC BLOOD PRESSURE: 122 MMHG | RESPIRATION RATE: 18 BRPM | SYSTOLIC BLOOD PRESSURE: 156 MMHG | HEIGHT: 69 IN

## 2022-09-10 DIAGNOSIS — R10.33 PERIUMBILICAL ABDOMINAL PAIN: Primary | ICD-10-CM

## 2022-09-10 DIAGNOSIS — R10.9 ABDOMINAL PAIN: ICD-10-CM

## 2022-09-10 PROCEDURE — 99284 EMERGENCY DEPT VISIT MOD MDM: CPT | Mod: 25

## 2022-09-10 PROCEDURE — 25000003 PHARM REV CODE 250: Performed by: PHYSICIAN ASSISTANT

## 2022-09-10 RX ORDER — HYOSCYAMINE SULFATE 0.125 MG
0.25 TABLET ORAL
Status: COMPLETED | OUTPATIENT
Start: 2022-09-10 | End: 2022-09-10

## 2022-09-10 RX ORDER — HYOSCYAMINE SULFATE 0.125 MG
125 TABLET ORAL EVERY 6 HOURS PRN
Qty: 30 TABLET | Refills: 0 | Status: SHIPPED | OUTPATIENT
Start: 2022-09-10 | End: 2022-10-19 | Stop reason: SDUPTHER

## 2022-09-10 RX ORDER — MAG HYDROX/ALUMINUM HYD/SIMETH 200-200-20
30 SUSPENSION, ORAL (FINAL DOSE FORM) ORAL
Status: DISCONTINUED | OUTPATIENT
Start: 2022-09-10 | End: 2022-09-10 | Stop reason: HOSPADM

## 2022-09-10 RX ORDER — PANTOPRAZOLE SODIUM 40 MG/1
40 TABLET, DELAYED RELEASE ORAL
Status: COMPLETED | OUTPATIENT
Start: 2022-09-10 | End: 2022-09-10

## 2022-09-10 RX ADMIN — PANTOPRAZOLE 40 MG: 40 TABLET, DELAYED RELEASE ORAL at 02:09

## 2022-09-10 RX ADMIN — HYOSCYAMINE SULFATE 0.25 MG: 0.12 TABLET ORAL at 02:09

## 2022-09-10 NOTE — ED PROVIDER NOTES
Encounter Date: 9/10/2022       History     Chief Complaint   Patient presents with    Abdominal Pain     Pt states he has abd pain since last night and is not able to eat anything. Denies n/v/d or urinary problems.      67-year-old gentleman history small-bowel neuroendocrine carcinoma atrial fibrillation on chronic anticoagulation presented to the emergency department with periumbilical abdominal pain describing as crampy in nature since this morning.  Denies any alleviating or exacerbating factors.  Do not take any of his medications today.  Of note, he has been seen multiple times in the emergency department this week mostly for complaints of the same.  His workup during those visits were benign including CT abdomen.  Other pertinent review of systems are negative including fever chills hematochezia constipation nausea vomiting diarrhea.      Review of patient's allergies indicates:  No Known Allergies  Past Medical History:   Diagnosis Date    Atrial fibrillation     BPH (benign prostatic hyperplasia)     Cardiac arrest     Coronary artery disease     Cyst, kidney, acquired     Diverticulosis     Hyperlipidemia     Hypertension     MI (myocardial infarction)     Obesity     Steatosis of liver      Past Surgical History:   Procedure Laterality Date    CARDIAC CATHETERIZATION      COLONOSCOPY W/ BIOPSIES      excision of colon       Family History   Problem Relation Age of Onset    Hypertension Mother     Hypertension Father     Hypertension Sister      Social History     Tobacco Use    Smoking status: Former    Smokeless tobacco: Never   Substance Use Topics    Alcohol use: Not Currently    Drug use: Not Currently     Review of Systems   Constitutional:  Negative for chills and fever.   HENT:  Negative for congestion and sinus pain.    Eyes:  Negative for redness and visual disturbance.   Respiratory:  Negative for cough and shortness of breath.    Cardiovascular:  Negative for chest pain and leg swelling.    Gastrointestinal:  Positive for abdominal pain. Negative for abdominal distention, anal bleeding, blood in stool, constipation, diarrhea, nausea, rectal pain and vomiting.   Endocrine: Negative for polydipsia and polyuria.   Genitourinary:  Negative for dysuria, flank pain, hematuria and testicular pain.   Musculoskeletal:  Negative for back pain and neck pain.   Skin:  Negative for rash and wound.   Neurological:  Negative for weakness and numbness.     Physical Exam     Initial Vitals [09/10/22 1346]   BP Pulse Resp Temp SpO2   (!) 156/122 97 20 98.7 °F (37.1 °C) 100 %      MAP       --         Physical Exam    Constitutional: He appears well-developed and well-nourished.   HENT:   Head: Normocephalic and atraumatic.   Neck: Neck supple.   Normal range of motion.  Cardiovascular:  Normal rate, regular rhythm, normal heart sounds and intact distal pulses.           Pulmonary/Chest: Breath sounds normal. No respiratory distress. He has no wheezes. He has no rhonchi. He has no rales.   Abdominal: Abdomen is soft. Bowel sounds are normal. He exhibits no distension and no mass. There is no abdominal tenderness.   Active bowel sounds in all 4 quadrants, dull to percussion, negative Lake's and McBurney's sign There is no rebound and no guarding.   Musculoskeletal:         General: Normal range of motion.      Cervical back: Normal range of motion and neck supple.     Neurological: He is alert and oriented to person, place, and time.   Skin: Skin is warm and dry.   Psychiatric: He has a normal mood and affect. His behavior is normal.       ED Course   Procedures  Labs Reviewed - No data to display       Imaging Results              X-Ray Abdomen Flat And Erect (Final result)  Result time 09/10/22 15:59:50      Final result by Sami Taylor MD (09/10/22 15:59:50)                   Impression:      Nonspecific bowel gas pattern.      Electronically signed by: Sami Taylor  Date:    09/10/2022  Time:    15:59                Narrative:    EXAMINATION:  XR ABDOMEN FLAT AND ERECT    CLINICAL HISTORY:  Unspecified abdominal pain    TECHNIQUE:  Two views    COMPARISON:  June 17, 2022    FINDINGS:  The intestinal gas pattern is nonspecific and nonobstructive. No air fluid levels or pneumoperitoneum identified.  Pelvic calcifications remain unchanged and favored to be phleboliths.                                       Medications   aluminum-magnesium hydroxide-simethicone 200-200-20 mg/5 mL suspension 30 mL (has no administration in time range)   pantoprazole EC tablet 40 mg (40 mg Oral Given 9/10/22 1440)   hyoscyamine tablet 0.25 mg (0.25 mg Oral Given 9/10/22 1440)                   ED Course as of 09/10/22 1618   Sat Sep 10, 2022   1432 67-year-old gentleman history of neuroendocrine carcinoma with small bowel status post resection presented to the emergency department with complaints of periumbilical abdominal pain he has been seen multiple times here in the emergency department for the same complaint this week.  I have reviewed his workup has been benign.  Including CT abdomen pelvis on the 2nd.  For on exam he has no tenderness to palpitation in her abdominal rigidity.  He deferred laboratory workup.  Will treat supportively and dinner abdominal flat and erect rule out obstruction pattern. [STEPH]   1526 Patient re-examined.  Abdominal pain has resolved, abdominal exam unchanged/benign. [STEPH]      ED Course User Index  [STEPH] KAVIN Brock             Clinical Impression:   Final diagnoses:  [R10.9] Abdominal pain  [R10.33] Periumbilical abdominal pain (Primary)        ED Disposition Condition    Discharge Stable          ED Prescriptions       Medication Sig Dispense Start Date End Date Auth. Provider    hyoscyamine (ANASPAZ,LEVSIN) 0.125 mg Tab Take 1 tablet (125 mcg total) by mouth every 6 (six) hours as needed (abdominal cramping). 30 tablet 9/10/2022 9/20/2022 KAVIN Brock          Follow-up Information       Follow up  With Specialties Details Why Contact Info    [facility] - Emergency Dept Emergency Medicine  As needed, If symptoms worsen [address] [facility] [address] [phone]             KAVIN Brock  09/10/22 8093

## 2022-09-22 ENCOUNTER — HOSPITAL ENCOUNTER (EMERGENCY)
Facility: HOSPITAL | Age: 67
Discharge: HOME | End: 2022-09-22
Attending: FAMILY MEDICINE
Payer: MEDICARE

## 2022-09-22 VITALS
HEIGHT: 69 IN | DIASTOLIC BLOOD PRESSURE: 101 MMHG | OXYGEN SATURATION: 98 % | WEIGHT: 240 LBS | BODY MASS INDEX: 35.55 KG/M2 | RESPIRATION RATE: 16 BRPM | TEMPERATURE: 98 F | HEART RATE: 78 BPM | SYSTOLIC BLOOD PRESSURE: 141 MMHG

## 2022-09-22 DIAGNOSIS — M54.50 LOW BACK PAIN WITHOUT SCIATICA, UNSPECIFIED BACK PAIN LATERALITY, UNSPECIFIED CHRONICITY: Primary | ICD-10-CM

## 2022-09-22 PROCEDURE — 99283 EMERGENCY DEPT VISIT LOW MDM: CPT | Mod: 25

## 2022-09-22 PROCEDURE — 25000003 PHARM REV CODE 250: Performed by: PHYSICIAN ASSISTANT

## 2022-09-22 RX ORDER — KETOROLAC TROMETHAMINE 10 MG/1
10 TABLET, FILM COATED ORAL
Status: COMPLETED | OUTPATIENT
Start: 2022-09-22 | End: 2022-09-22

## 2022-09-22 RX ORDER — METHOCARBAMOL 500 MG/1
500 TABLET, FILM COATED ORAL
Status: COMPLETED | OUTPATIENT
Start: 2022-09-22 | End: 2022-09-22

## 2022-09-22 RX ORDER — HYDROCODONE BITARTRATE AND ACETAMINOPHEN 5; 325 MG/1; MG/1
1 TABLET ORAL
Status: DISCONTINUED | OUTPATIENT
Start: 2022-09-22 | End: 2022-09-22

## 2022-09-22 RX ORDER — METHOCARBAMOL 500 MG/1
500 TABLET, FILM COATED ORAL
Status: DISCONTINUED | OUTPATIENT
Start: 2022-09-22 | End: 2022-09-22

## 2022-09-22 RX ORDER — METHOCARBAMOL 500 MG/1
500 TABLET, FILM COATED ORAL 3 TIMES DAILY
Qty: 15 TABLET | Refills: 0 | Status: SHIPPED | OUTPATIENT
Start: 2022-09-22 | End: 2022-09-27

## 2022-09-22 RX ADMIN — KETOROLAC TROMETHAMINE 10 MG: 10 TABLET, FILM COATED ORAL at 09:09

## 2022-09-22 RX ADMIN — METHOCARBAMOL 500 MG: 500 TABLET ORAL at 09:09

## 2022-09-22 NOTE — ED PROVIDER NOTES
Encounter Date: 9/22/2022       History     Chief Complaint   Patient presents with    Back Pain     PT IN /AASI W CO LOWER BACK PAIN SINCE THIS AM.  DENIES INJURY.  SLOANE CHERYL Goncalves reports to the ER with exacerbation of chronic back pain; pt denies cp/sob/fall    The history is provided by the patient.   Back Pain   This is a recurrent problem. The current episode started today. The problem occurs throughout the day. The problem has been unchanged. The pain is associated with no known injury. The pain is present in the lumbar spine. The quality of the pain is described as aching. The pain is at a severity of 4/10. The symptoms are aggravated by bending and twisting. The pain is The same all the time. Pertinent negatives include no chest pain, no fever, no numbness, no abdominal pain, no bowel incontinence, no perianal numbness, no bladder incontinence, no dysuria, no pelvic pain, no paresthesias and no weakness. He has tried bed rest for the symptoms. The treatment provided mild relief.   Review of patient's allergies indicates:  No Known Allergies  Past Medical History:   Diagnosis Date    Atrial fibrillation     BPH (benign prostatic hyperplasia)     Cardiac arrest     Coronary artery disease     Cyst, kidney, acquired     Diverticulosis     Hyperlipidemia     Hypertension     MI (myocardial infarction)     Obesity     Steatosis of liver      Past Surgical History:   Procedure Laterality Date    CARDIAC CATHETERIZATION      COLONOSCOPY W/ BIOPSIES      excision of colon       Family History   Problem Relation Age of Onset    Hypertension Mother     Hypertension Father     Hypertension Sister      Social History     Tobacco Use    Smoking status: Former    Smokeless tobacco: Never   Substance Use Topics    Alcohol use: Not Currently    Drug use: Not Currently     Review of Systems   Constitutional:  Negative for fever.   HENT:  Negative for sore throat.    Respiratory:  Negative for shortness of breath.     Cardiovascular:  Negative for chest pain.   Gastrointestinal:  Negative for abdominal pain, bowel incontinence and nausea.   Genitourinary:  Negative for bladder incontinence, dysuria and pelvic pain.   Musculoskeletal:  Positive for back pain.   Skin:  Negative for rash.   Neurological:  Negative for weakness, numbness and paresthesias.   Hematological:  Does not bruise/bleed easily.   Psychiatric/Behavioral: Negative.       Physical Exam     Initial Vitals [09/22/22 0753]   BP Pulse Resp Temp SpO2   (!) 141/101 78 16 97.9 °F (36.6 °C) 98 %      MAP       --         Physical Exam    Vitals reviewed.  Constitutional: He appears well-developed.   HENT:   Head: Normocephalic and atraumatic.   Eyes: Conjunctivae and EOM are normal. Pupils are equal, round, and reactive to light.   Neck:   Normal range of motion.  Cardiovascular:  Normal rate, regular rhythm and normal heart sounds.           Pulmonary/Chest: Breath sounds normal. He exhibits no tenderness.   Abdominal: Abdomen is soft. Bowel sounds are normal. He exhibits no distension. There is no abdominal tenderness.   Musculoskeletal:         General: No edema.      Cervical back: Normal and normal range of motion.      Thoracic back: Normal.      Lumbar back: Tenderness present. No swelling, edema or signs of trauma. Decreased range of motion.        Back:      Neurological: He is alert and oriented to person, place, and time. He displays normal reflexes. No cranial nerve deficit or sensory deficit. GCS score is 15. GCS eye subscore is 4. GCS verbal subscore is 5. GCS motor subscore is 6.   Skin: Skin is warm. No pallor.   Psychiatric: He has a normal mood and affect. His behavior is normal. Judgment and thought content normal.       ED Course   Procedures  Labs Reviewed - No data to display       Imaging Results    None          Medications   methocarbamoL tablet 500 mg (500 mg Oral Given 9/22/22 0904)   ketorolac tablet 10 mg (10 mg Oral Given 9/22/22 0915)                  ED Course as of 09/22/22 1114   Thu Sep 22, 2022   1114 Patient reports improvement of pain after PO meds   [AL]      ED Course User Index  [AL] KAVIN Vences                 Clinical Impression:   Final diagnoses:  [M54.50] Low back pain without sciatica, unspecified back pain laterality, unspecified chronicity (Primary)      ED Disposition Condition    Discharge Stable          ED Prescriptions       Medication Sig Dispense Start Date End Date Auth. Provider    methocarbamoL (ROBAXIN) 500 MG Tab Take 1 tablet (500 mg total) by mouth 3 (three) times daily. for 5 days 15 tablet 9/22/2022 9/27/2022 KAVIN Vences          Follow-up Information       Follow up With Specialties Details Why Contact Info    discharge followup    If your symptoms become WORSE or you DO NOT IMPROVE and you are unable to reach your health care provider, you should RETURN to the emergency department    discharge info    Discussed all pertinent ED information, results, diagnosis and treatment plan; All questions and concerns were addressed at this time. Patient voices understanding of information and instructions. Patient is comfortable with plan and discharge             KAVIN Vences  09/22/22 1114

## 2022-10-01 ENCOUNTER — HOSPITAL ENCOUNTER (EMERGENCY)
Facility: HOSPITAL | Age: 67
Discharge: HOME | End: 2022-10-01
Attending: EMERGENCY MEDICINE
Payer: MEDICARE

## 2022-10-01 VITALS
TEMPERATURE: 98 F | DIASTOLIC BLOOD PRESSURE: 107 MMHG | HEIGHT: 69 IN | WEIGHT: 239.63 LBS | SYSTOLIC BLOOD PRESSURE: 156 MMHG | BODY MASS INDEX: 35.49 KG/M2 | OXYGEN SATURATION: 99 % | HEART RATE: 69 BPM | RESPIRATION RATE: 17 BRPM

## 2022-10-01 DIAGNOSIS — E87.6 HYPOKALEMIA: ICD-10-CM

## 2022-10-01 DIAGNOSIS — R10.9 ABDOMINAL PAIN: ICD-10-CM

## 2022-10-01 DIAGNOSIS — R11.14 BILIOUS VOMITING WITH NAUSEA: Primary | ICD-10-CM

## 2022-10-01 LAB
ALBUMIN SERPL-MCNC: 3.9 GM/DL (ref 3.4–4.8)
ALBUMIN/GLOB SERPL: 1.3 RATIO (ref 1.1–2)
ALP SERPL-CCNC: 67 UNIT/L (ref 40–150)
ALT SERPL-CCNC: 15 UNIT/L (ref 0–55)
AST SERPL-CCNC: 17 UNIT/L (ref 5–34)
BASOPHILS # BLD AUTO: 0.07 X10(3)/MCL (ref 0–0.2)
BASOPHILS NFR BLD AUTO: 1.1 %
BILIRUBIN DIRECT+TOT PNL SERPL-MCNC: 1.9 MG/DL
BUN SERPL-MCNC: 9.9 MG/DL (ref 8.4–25.7)
CALCIUM SERPL-MCNC: 9.4 MG/DL (ref 8.8–10)
CHLORIDE SERPL-SCNC: 106 MMOL/L (ref 98–107)
CO2 SERPL-SCNC: 22 MMOL/L (ref 23–31)
CREAT SERPL-MCNC: 1.07 MG/DL (ref 0.73–1.18)
EOSINOPHIL # BLD AUTO: 0.08 X10(3)/MCL (ref 0–0.9)
EOSINOPHIL NFR BLD AUTO: 1.3 %
ERYTHROCYTE [DISTWIDTH] IN BLOOD BY AUTOMATED COUNT: 13.6 % (ref 11.5–17)
GFR SERPLBLD CREATININE-BSD FMLA CKD-EPI: >60 MLS/MIN/1.73/M2
GLOBULIN SER-MCNC: 3.1 GM/DL (ref 2.4–3.5)
GLUCOSE SERPL-MCNC: 118 MG/DL (ref 82–115)
HCT VFR BLD AUTO: 46.6 % (ref 42–52)
HGB BLD-MCNC: 15 GM/DL (ref 14–18)
IMM GRANULOCYTES # BLD AUTO: 0.03 X10(3)/MCL (ref 0–0.04)
IMM GRANULOCYTES NFR BLD AUTO: 0.5 %
LIPASE SERPL-CCNC: 25 U/L
LYMPHOCYTES # BLD AUTO: 2.09 X10(3)/MCL (ref 0.6–4.6)
LYMPHOCYTES NFR BLD AUTO: 32.8 %
MCH RBC QN AUTO: 29.2 PG (ref 27–31)
MCHC RBC AUTO-ENTMCNC: 32.2 MG/DL (ref 33–36)
MCV RBC AUTO: 90.7 FL (ref 80–94)
MONOCYTES # BLD AUTO: 0.62 X10(3)/MCL (ref 0.1–1.3)
MONOCYTES NFR BLD AUTO: 9.7 %
NEUTROPHILS # BLD AUTO: 3.5 X10(3)/MCL (ref 2.1–9.2)
NEUTROPHILS NFR BLD AUTO: 54.6 %
NRBC BLD AUTO-RTO: 0 %
PLATELET # BLD AUTO: 222 X10(3)/MCL (ref 130–400)
PMV BLD AUTO: 10.3 FL (ref 7.4–10.4)
POTASSIUM SERPL-SCNC: 3.1 MMOL/L (ref 3.5–5.1)
PROT SERPL-MCNC: 7 GM/DL (ref 5.8–7.6)
RBC # BLD AUTO: 5.14 X10(6)/MCL (ref 4.7–6.1)
SODIUM SERPL-SCNC: 140 MMOL/L (ref 136–145)
TROPONIN I SERPL-MCNC: <0.01 NG/ML (ref 0–0.04)
WBC # SPEC AUTO: 6.4 X10(3)/MCL (ref 4.5–11.5)

## 2022-10-01 PROCEDURE — 80053 COMPREHEN METABOLIC PANEL: CPT | Performed by: PHYSICIAN ASSISTANT

## 2022-10-01 PROCEDURE — 83690 ASSAY OF LIPASE: CPT | Performed by: PHYSICIAN ASSISTANT

## 2022-10-01 PROCEDURE — 25000003 PHARM REV CODE 250: Performed by: PHYSICIAN ASSISTANT

## 2022-10-01 PROCEDURE — 36415 COLL VENOUS BLD VENIPUNCTURE: CPT | Performed by: PHYSICIAN ASSISTANT

## 2022-10-01 PROCEDURE — 84484 ASSAY OF TROPONIN QUANT: CPT | Performed by: PHYSICIAN ASSISTANT

## 2022-10-01 PROCEDURE — 99284 EMERGENCY DEPT VISIT MOD MDM: CPT

## 2022-10-01 PROCEDURE — 85025 COMPLETE CBC W/AUTO DIFF WBC: CPT | Performed by: PHYSICIAN ASSISTANT

## 2022-10-01 RX ORDER — POTASSIUM CHLORIDE 20 MEQ/1
40 TABLET, EXTENDED RELEASE ORAL
Status: COMPLETED | OUTPATIENT
Start: 2022-10-01 | End: 2022-10-01

## 2022-10-01 RX ORDER — ONDANSETRON 4 MG/1
8 TABLET, ORALLY DISINTEGRATING ORAL
Status: COMPLETED | OUTPATIENT
Start: 2022-10-01 | End: 2022-10-01

## 2022-10-01 RX ADMIN — POTASSIUM CHLORIDE 40 MEQ: 1500 TABLET, EXTENDED RELEASE ORAL at 02:10

## 2022-10-01 RX ADMIN — ONDANSETRON 8 MG: 4 TABLET, ORALLY DISINTEGRATING ORAL at 12:10

## 2022-10-01 NOTE — ED PROVIDER NOTES
"Encounter Date: 10/1/2022       History     Chief Complaint   Patient presents with    Emesis     To the ED to get "checked out" after episodes of vomiting; last at 2am. Has no complaints at this time.      68 YO AAM in ER with complaints of abdominal cramping, nausea and about 5 episodes of non bloody vomiting around 2 AM today. Denies fever, chills, chest pain, SOB, diarrhea, HA or dizziness. Last BM was while in ER today and able to pass gas. No other complaints.     The history is provided by the patient.   Review of patient's allergies indicates:  No Known Allergies  Past Medical History:   Diagnosis Date    Atrial fibrillation     BPH (benign prostatic hyperplasia)     Cardiac arrest     Coronary artery disease     Cyst, kidney, acquired     Diverticulosis     Hyperlipidemia     Hypertension     MI (myocardial infarction)     Obesity     Steatosis of liver      Past Surgical History:   Procedure Laterality Date    CARDIAC CATHETERIZATION      COLONOSCOPY W/ BIOPSIES      excision of colon       Family History   Problem Relation Age of Onset    Hypertension Mother     Hypertension Father     Hypertension Sister      Social History     Tobacco Use    Smoking status: Former    Smokeless tobacco: Never   Substance Use Topics    Alcohol use: Not Currently    Drug use: Not Currently     Review of Systems   Constitutional:  Negative for chills and fever.   HENT:  Negative for congestion and trouble swallowing.    Respiratory:  Negative for shortness of breath and wheezing.    Cardiovascular:  Negative for chest pain and leg swelling.   Gastrointestinal:  Positive for abdominal pain, nausea and vomiting. Negative for diarrhea.   Musculoskeletal:  Negative for joint swelling.   Skin:  Negative for rash.   Neurological:  Negative for dizziness, weakness and headaches.   All other systems reviewed and are negative.    Physical Exam     Initial Vitals [10/01/22 1143]   BP Pulse Resp Temp SpO2   (S) (!) 162/110 81 17 98.3 " °F (36.8 °C) 99 %      MAP       --         Physical Exam    Nursing note and vitals reviewed.  Constitutional: He appears well-developed and well-nourished.   HENT:   Head: Normocephalic and atraumatic.   Mouth/Throat: Oropharynx is clear and moist.   Eyes: Conjunctivae are normal.   Neck: Neck supple.   Normal range of motion.  Cardiovascular:  Normal rate, regular rhythm and intact distal pulses.           Pulmonary/Chest: Breath sounds normal.   Abdominal: Abdomen is protuberant. Bowel sounds are normal. He exhibits distension. A surgical scar is present. There is generalized abdominal tenderness.   Midline surgical scar, abdomen is distended There is no rebound, no guarding and negative Lake's sign.   Musculoskeletal:         General: Normal range of motion.      Cervical back: Normal range of motion and neck supple.     Neurological: He is alert and oriented to person, place, and time.   Skin: Skin is dry.   Psychiatric: He has a normal mood and affect.       ED Course   Procedures  Labs Reviewed   COMPREHENSIVE METABOLIC PANEL - Abnormal; Notable for the following components:       Result Value    Potassium Level 3.1 (*)     Carbon Dioxide 22 (*)     Glucose Level 118 (*)     Bilirubin Total 1.9 (*)     All other components within normal limits   CBC WITH DIFFERENTIAL - Abnormal; Notable for the following components:    MCHC 32.2 (*)     All other components within normal limits   TROPONIN I - Normal   LIPASE - Normal   CBC W/ AUTO DIFFERENTIAL    Narrative:     The following orders were created for panel order CBC auto differential.  Procedure                               Abnormality         Status                     ---------                               -----------         ------                     CBC with Differential[[id number]]        Abnormal            Final result                 Please view results for these tests on the individual orders.          Imaging Results              X-Ray Abdomen  Flat And Erect (Final result)  Result time 10/01/22 13:47:31      Final result by Sami Taylor MD (10/01/22 13:47:31)                   Impression:      Nonspecific bowel gas pattern.      Electronically signed by: Sami Taylor  Date:    10/01/2022  Time:    13:47               Narrative:    EXAMINATION:  XR ABDOMEN FLAT AND ERECT    CLINICAL HISTORY:  Unspecified abdominal pain    TECHNIQUE:  Two views    COMPARISON:  September 10, 2022    FINDINGS:  The intestinal gas pattern is nonspecific and nonobstructive. No air fluid levels or pneumoperitoneum identified.  Visualized portion of the lungs are clear.                                       Medications   potassium chloride SA CR tablet 40 mEq (has no administration in time range)   ondansetron disintegrating tablet 8 mg (8 mg Oral Given 10/1/22 1254)                 ED Course as of 10/01/22 1433   Sat Oct 01, 2022   1152 Upon further chart review pt noted to have recurrent abdominal pain and last CT was 9/7/2022 with no acute findings.  [TT]   1345 VSS, NAD, pt is non-toxic or ill appearing, labs and imaging reviewed with pt, treatment plan and discharge instructions including follow up discussed, pt verbalized understanding, all questions answered, pt is stable and ready for discharge , tolerated PO challenge and was able to drink Coke while in ER, no vomiting during ER visit today [TT]      ED Course User Index  [TT] KAVIN Wray                 Clinical Impression:   Final diagnoses:  [R10.9] Abdominal pain  [R11.14] Bilious vomiting with nausea (Primary)  [E87.6] Hypokalemia        ED Disposition Condition    Discharge Stable          ED Prescriptions    None       Follow-up Information       Follow up With Specialties Details Why Contact Info    [facility] - Emergency Dept Emergency Medicine In 3 days As needed, If symptoms worsen [address] [facility] [address] [phone]    Primary Care Provider  Schedule an appointment  as soon as possible for a visit in 5 days  Call a PCP to make an appointment for follow up within 3-5  days.  You can all the phone number on the back of your insurance card for accepting providers as discussed.             KAVIN Wray  10/01/22 6975

## 2022-10-01 NOTE — DISCHARGE INSTRUCTIONS
Drink  plenty of fluids to keep your urine clear.     Take all medications as prescribed.     Follow up with your PCP in 3-5 days.     Return to ER for any worsening or changes in symptoms.

## 2022-10-05 ENCOUNTER — OFFICE VISIT (OUTPATIENT)
Dept: CARDIOLOGY | Facility: CLINIC | Age: 67
End: 2022-10-05
Payer: MEDICARE

## 2022-10-05 VITALS
RESPIRATION RATE: 16 BRPM | HEART RATE: 80 BPM | DIASTOLIC BLOOD PRESSURE: 92 MMHG | OXYGEN SATURATION: 100 % | WEIGHT: 239.81 LBS | BODY MASS INDEX: 35.52 KG/M2 | HEIGHT: 69 IN | SYSTOLIC BLOOD PRESSURE: 142 MMHG

## 2022-10-05 DIAGNOSIS — I44.1 SECOND DEGREE AV BLOCK: ICD-10-CM

## 2022-10-05 DIAGNOSIS — I48.11 LONGSTANDING PERSISTENT ATRIAL FIBRILLATION: ICD-10-CM

## 2022-10-05 DIAGNOSIS — C7A.8 NEUROENDOCRINE CARCINOMA OF SMALL BOWEL: ICD-10-CM

## 2022-10-05 DIAGNOSIS — I46.9 CARDIAC ARREST WITH VENTRICULAR FIBRILLATION: ICD-10-CM

## 2022-10-05 DIAGNOSIS — Z95.0 CARDIAC PACEMAKER IN SITU: ICD-10-CM

## 2022-10-05 DIAGNOSIS — I25.10 CORONARY ARTERY DISEASE INVOLVING NATIVE HEART WITHOUT ANGINA PECTORIS, UNSPECIFIED VESSEL OR LESION TYPE: ICD-10-CM

## 2022-10-05 DIAGNOSIS — E87.6 HYPOKALEMIA: Primary | ICD-10-CM

## 2022-10-05 DIAGNOSIS — I49.01 CARDIAC ARREST WITH VENTRICULAR FIBRILLATION: ICD-10-CM

## 2022-10-05 DIAGNOSIS — I10 HYPERTENSION, UNSPECIFIED TYPE: ICD-10-CM

## 2022-10-05 DIAGNOSIS — E78.5 HYPERLIPIDEMIA LDL GOAL <70: ICD-10-CM

## 2022-10-05 PROCEDURE — 99214 OFFICE O/P EST MOD 30 MIN: CPT | Mod: PBBFAC | Performed by: INTERNAL MEDICINE

## 2022-10-05 RX ORDER — METOPROLOL SUCCINATE 100 MG/1
100 TABLET, EXTENDED RELEASE ORAL 2 TIMES DAILY
Qty: 180 TABLET | Refills: 3 | Status: SHIPPED | OUTPATIENT
Start: 2022-10-05 | End: 2022-11-10 | Stop reason: SDUPTHER

## 2022-10-05 RX ORDER — NIFEDIPINE 30 MG/1
30 TABLET, FILM COATED, EXTENDED RELEASE ORAL DAILY
Qty: 90 TABLET | Refills: 3 | Status: SHIPPED | OUTPATIENT
Start: 2022-10-05 | End: 2022-10-19 | Stop reason: SDUPTHER

## 2022-10-05 NOTE — PROGRESS NOTES
[facility] Note       History of Present Illness:      Delio Daniel Jr. is a 67 y.o. male with a PMH significant for HTN, HLD, small carcinoid tumor s/p resection, CAD w hx of STEMI in 2003, paroxysmal Afib on xarelto, HFpEF (55%) and second degree AV block s/ PPM ([facility])11/13/17 (upgraded to dcICD 5/18 2/2 Vfib arrest in 3/18 in setting of prolonged QT and hypoK) presents for a routine follow up.   Pt has been feeling well with no cardiovascular complaints. No limitations in ADLs. He uses a walker. No exertional chest pain, shortness of breath, fatigue.  Patient also denies orthopnea, PND, lower extremity edema, palpitations, dizziness, lightheadedness or syncope.    Last device check in 8/2022 showed >99% mode switch, 32 episodes of NSVT and 2 episodes of VT thought to be Afib. RV pacing 30% of the time.   Battery life: 1.5-1.9 years      Lexiscan 10/30/20- negative  Echo 2/2020 EF 55%, LVH, mild MR/TR, RSVP 28 mmHg  Device interrogation 4/21 revealed 32 NSVT, normal device function  Device interrogation 5/17/22: 32 NSVT-->Afib RVR; V rates 180-200 bpm. Asymptomatic. Normal functioning    Past Medical History:     Past Medical History:   Diagnosis Date    Atrial fibrillation     BPH (benign prostatic hyperplasia)     Cardiac arrest     Coronary artery disease     Cyst, kidney, acquired     Diverticulosis     Hyperlipidemia     Hypertension     MI (myocardial infarction)     Obesity     Steatosis of liver      Surgical History:     Past Surgical History:   Procedure Laterality Date    CARDIAC CATHETERIZATION      COLONOSCOPY W/ BIOPSIES      excision of colon       Allergies:   Review of patient's allergies indicates:  No Known Allergies  Family History:     Family History   Problem Relation Age of Onset    Hypertension Mother     Hypertension Father     Hypertension Sister      Social History:     Social History     Tobacco Use    Smoking status: Former    Smokeless tobacco: Never   Substance Use  "Topics    Alcohol use: Not Currently    Drug use: Not Currently     Review of Systems:     ROS   Constitutional: negative for fever,chills, sweats, weakness, fatigue, decreased activity   Eye: negative for blurry vision, vision change  ENMT: negative for sore throat, nasal congestion  Respiratory: negative for shortness of breath, cough, wheezing  Cardiovascular: negative for chest pain, dyspnea on exertion, orthopnea, PND, lower extremity edema, palpitations, claudication  Gastrointestinal: negative for nausea, vomiting, abdominal pain, constipation, diarrhea, blood in stool  Genitourinary: negative for hematuria, nocturia, dysuria  Endocrine: negative for abnormal thirst, temperature  Musculoskeletal: negative for back pain, joint pain  Integumentary: negative for abnormal mole  Neurologic: negative for weakness, numbness, headaches, shooting pain  Hematology: negative for easy bruising, easy bleeding  Allergy: negative for watery eyes, sneezing  Psychiatric: negative for loss of interest, anxiety, stress        Objective:         Vitals: BP (!) 149/107   Pulse 80   Resp 16   Ht 5' 9" (1.753 m)   Wt 108.8 kg (239 lb 12.8 oz)   SpO2 100%   BMI 35.41 kg/m²   [unfilled]    Physical Exam   General: Well nourished   HEENT: NC/AT; PERRL; nasal and oral mucosa moist and clear  Neck: Full ROM; no lymphadenopathy  Pulm: Diminished lower lobes bilaterally, normal work of breathing  CV: S1, S2 w/ systolic murmurs or gallops; trace edema BLE noted;  GI: Soft with normal bowel sounds in all quadrants, no masses on palpation  MSK: Full ROM of all extremities and spine w/o limitation or discomfort  Derm: No rashes, abnormal bruising, or skin lesions  Neuro: AAOx4; motor/sensory function intact  Psych: Cooperative; appropriate mood and affect  Cardiovascular Studies/Imaging:   I have reviewed the following studies below:    TTE    2/3/20  Mild LVH  EF 55%  Mild MR  RSVP 28 mmHg      Current Outpatient Medications:     " aspirin 81 MG Chew, chew and swallow 1 tablet by mouth daily, Disp: 90 tablet, Rfl: 3    atorvastatin (LIPITOR) 40 MG tablet, Take 1 tablet (40 mg total) by mouth once daily., Disp: 90 tablet, Rfl: 0    diltiaZEM (CARDIZEM CD) 180 MG 24 hr capsule, Take 1 capsule (180 mg total) by mouth once daily., Disp: 90 capsule, Rfl: 3    losartan (COZAAR) 50 MG tablet, Take 2 tablet (100 mg total) by mouth once daily., Disp: 90 tablet, Rfl: 3    metoprolol succinate (TOPROL-XL) 50 MG 24 hr tablet, Take 1 tablet (50 mg total) by mouth 2 (two) times daily., Disp: 180 tablet, Rfl: 3    potassium chloride (K-TAB) 20 mEq, Take 1 tablet (20 mEq total) by mouth 2 (two) times a day., Disp: 90 tablet, Rfl: 3    spironolactone (ALDACTONE) 50 MG tablet, Take 1 tablet (50 mg total) by mouth once daily., Disp: 90 tablet, Rfl: 3    vitamin D (VITAMIN D3) 1000 units Tab, Take 25 mcg by mouth., Disp: , Rfl:     XARELTO 20 mg Tab, TAKE 1 TABLET BY MOUTH DAILY with SUPPER, Disp: 90 tablet, Rfl: 3    hyoscyamine (ANASPAZ,LEVSIN) 0.125 mg Tab, Take 1 tablet (125 mcg total) by mouth every 6 (six) hours as needed (abdominal cramping)., Disp: 30 tablet, Rfl: 0    omeprazole (PRILOSEC) 20 MG capsule, Take 1 capsule (20 mg total) by mouth once daily., Disp: 30 capsule, Rfl: 2      Assessment:     Delio Daniel Jr. is a 66 y.o. male with a PMH significant for HTN, HLD, small carcinoid tumor s/p resection, CAD w hx of STEMI in 2003, paroxysmal Afib on xarelto, HFpEF (55%) and second degree AV block s/p PPM ([facility])11/13/17 (upgraded to dcICD 5/18 2/2 Vfib arrest in 3/18 in setting of prolonged QT and hypoK) presents for a routine follow up.    Plan/Recommendations:   CAD with MI in 2003  CAD has been quiescent   Continue ASA, statin, beta blocker    Persistent Afib   Last device check in 8/2022 showed >99% mode switch, 32 episodes of NSVT and 2 episodes of VT thought to be Afib.   Dilt increased to 180mg during last visit  Will increase toprol to  100m bid  Continue xarelto, informed him to take it with heaviest meal of the day    2nd degree AVB s/p PPM-->upgrade to ICD for secondary prevention 2/2 VFib arrest  Normally functioning, 99% Mode switch  RV pacing 30% of the time  Battery life: 1.5-1.9 years  Routine checks q3m    HTN  BP not well controlled at 149/107 with repeat 142/92  Pt already maxed out on ARB and is on high dose spironolactone  Would prefer to avoid thiazide due to hypokalemia  Despite being on nondihydropyridine CCB, will add nifedipine 30mg.  If pt develops side effects, will consider increasing spironolactone to 100mg  NV in 2 weeks    Hypokalemia  Unknown etiology, will not be able to test for hyperaldo due to spironolactone and ARB therapy   Last K was 3.1 but that was in the setting of vomiting  Will repeat BMP in 2 weeks with NV    HLP   Goal <70 given CAD  LDL 21 in 7/2021    Increase toprol  Add nifedipine  BMP  NV  4 month F/U

## 2022-10-05 NOTE — PATIENT INSTRUCTIONS
Increase metoprolol to 100mg twice a day    Start taking Nifedipine 30mg daily    Get labs before nurse visit/ pacemaker check    Bring all meds to nurse visit    Follow up with  in 4 months

## 2022-10-10 ENCOUNTER — HOSPITAL ENCOUNTER (EMERGENCY)
Facility: HOSPITAL | Age: 67
Discharge: HOME | End: 2022-10-10
Attending: INTERNAL MEDICINE
Payer: MEDICARE

## 2022-10-10 ENCOUNTER — HOSPITAL ENCOUNTER (OUTPATIENT)
Dept: RADIOLOGY | Facility: HOSPITAL | Age: 67
Discharge: HOME | End: 2022-10-10
Attending: INTERNAL MEDICINE
Payer: MEDICARE

## 2022-10-10 VITALS
SYSTOLIC BLOOD PRESSURE: 143 MMHG | BODY MASS INDEX: 34.35 KG/M2 | HEART RATE: 80 BPM | TEMPERATURE: 98 F | HEIGHT: 69 IN | DIASTOLIC BLOOD PRESSURE: 100 MMHG | OXYGEN SATURATION: 99 % | WEIGHT: 231.94 LBS | RESPIRATION RATE: 20 BRPM

## 2022-10-10 DIAGNOSIS — R91.1 NODULE OF LEFT LUNG: ICD-10-CM

## 2022-10-10 DIAGNOSIS — C7A.8 NEUROENDOCRINE CARCINOMA OF SMALL BOWEL: ICD-10-CM

## 2022-10-10 DIAGNOSIS — M54.50 RIGHT-SIDED LOW BACK PAIN WITHOUT SCIATICA, UNSPECIFIED CHRONICITY: Primary | ICD-10-CM

## 2022-10-10 PROCEDURE — 25000003 PHARM REV CODE 250: Performed by: NURSE PRACTITIONER

## 2022-10-10 PROCEDURE — 71250 CT THORAX DX C-: CPT | Mod: TC

## 2022-10-10 PROCEDURE — 99283 EMERGENCY DEPT VISIT LOW MDM: CPT | Mod: 25

## 2022-10-10 RX ORDER — METHOCARBAMOL 500 MG/1
1000 TABLET, FILM COATED ORAL
Status: COMPLETED | OUTPATIENT
Start: 2022-10-10 | End: 2022-10-10

## 2022-10-10 RX ADMIN — METHOCARBAMOL 1000 MG: 500 TABLET ORAL at 08:10

## 2022-10-10 NOTE — DISCHARGE INSTRUCTIONS
Follow up with PCP in 5-7 days for additional evaluation.  Warm compresses to affected areas and soak in Epsom Salt for comfort.  Take home pain medication as previously directed for pain control.

## 2022-10-10 NOTE — ED PROVIDER NOTES
"Encounter Date: 10/10/2022       History     Chief Complaint   Patient presents with    Back Pain     C/o back pain since yesterday.      Pt is a 67 y.o. male who presents to the [facility] ED complaining of lower back pain. Reports pain began after he "slept wrong" yesterday. Denies direct trauma to lower back, chest pain, SOB, weakness, dizziness, fever, or loss of bowel or bladder control. Says he has medication for pain at home but he did not take it because he was coming for a procedure this AM at the hospital.     Review of patient's allergies indicates:  No Known Allergies  Past Medical History:   Diagnosis Date    Atrial fibrillation     BPH (benign prostatic hyperplasia)     Cardiac arrest     Coronary artery disease     Cyst, kidney, acquired     Diverticulosis     Hyperlipidemia     Hypertension     MI (myocardial infarction)     Obesity     Steatosis of liver      Past Surgical History:   Procedure Laterality Date    CARDIAC CATHETERIZATION      COLONOSCOPY W/ BIOPSIES      excision of colon       Family History   Problem Relation Age of Onset    Hypertension Mother     Hypertension Father     Hypertension Sister      Social History     Tobacco Use    Smoking status: Former    Smokeless tobacco: Never   Substance Use Topics    Alcohol use: Not Currently    Drug use: Not Currently     Review of Systems   Constitutional:  Negative for chills, diaphoresis, fatigue and fever.   HENT:  Negative for facial swelling, postnasal drip, rhinorrhea, sinus pressure, sinus pain, sore throat and trouble swallowing.    Respiratory:  Negative for cough, chest tightness, shortness of breath and wheezing.    Cardiovascular:  Negative for chest pain, palpitations and leg swelling.   Gastrointestinal:  Negative for abdominal pain, diarrhea, nausea and vomiting.   Genitourinary:  Negative for dysuria, flank pain, hematuria and urgency.   Musculoskeletal:  Positive for back pain. Negative for arthralgias and myalgias.   Skin:  " Negative for color change and rash.   Neurological:  Negative for dizziness, syncope, weakness and headaches.   Hematological:  Does not bruise/bleed easily.   All other systems reviewed and are negative.    Physical Exam     Initial Vitals [10/10/22 0818]   BP Pulse Resp Temp SpO2   (!) 143/100 80 20 97.7 °F (36.5 °C) 99 %      MAP       --         Physical Exam    ED Course   Procedures  Labs Reviewed - No data to display       Imaging Results    None          Medications   methocarbamoL tablet 1,000 mg (has no administration in time range)     Medical Decision Making:   Differential Diagnosis:   Low back pain  Muscle strain    ED Management:  8:29 AM Reassessed patient at this time. Reports condition has improved. Discussed with patient all pertinent ED information and results. Discussed diagnosis and treatment plan with patient. Follow up instructions and return to ED instruction have been given. All questions and concerns were addressed at this time. Patient voices understanding of information and instructions. Patient is comfortable with plan and discharge. Patient is stable for discharge.                           Clinical Impression:   Final diagnoses:  [M54.50] Right-sided low back pain without sciatica, unspecified chronicity (Primary)      ED Disposition Condition    Discharge Stable          ED Prescriptions    None       Follow-up Information       Follow up With Specialties Details Why Contact Info    [facility] - Emergency Dept Emergency Medicine In 3 days As needed, If symptoms worsen [address] [place] [id number] [phone]             Tani Hernandez Jr., FNP  10/10/22 0845

## 2022-10-12 NOTE — PROGRESS NOTES
Past medical history: Atrial fibrillation.  Coronary artery disease.  History of cardiac arrest with ventricular fibrillation.  Hypertension.  Dyslipidemia.  MI in 2003.  Obesity.  Status post CPR 03/08/2018. 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 HFpEF (EF >55% on echo 11/2018).  History of second-degree AV block requiring PPM.  Social history: Single.  Has 9 children.  Lives in [place].  Used to work as a  at [employer].  Smoked up to 5 packs of cigarettes daily for 10-20 years; quit in 1970s.  Used to drink vodka every weekend until he got drunk; drank for about 35 years, then quit.  Used to smoke marijuana.  Family history: Negative for malignancy.  Health maintenance: He is unsure when he had last colonoscopy performed, probably 10 years back, at [facility], apparently unremarkable.      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     Oncologic history:  # pT3,4 pN2 MX, at least stage III, well-differentiated neuroendocrine tumor of small bowel, multifocal, grade 1; large mesenteric mass (3.8 cm); 2:21 lymph nodes involved; small multifocal areas of tumor in the efren-intestinal adipose tissue.   Presented with small bowel obstruction.  Status post resection of 127 cm of small bowel and mesenteric mass in the base of the mesentery, on 11/02/2018.   -Our request for contrast-enhanced chest CT for staging, was denied   -12/26/2018: CT A/P with contrast: 25 mm area of mesenteric soft tissue may reflect residual mass or postsurgical change; 2 millimetric hepatic lesions are too small to characterize, stable from October 2018, suggest close attention on follow-up.   -12/27/2018: Colonoscopy: 3 mm hyperplastic sigmoid polyp; no malignancy   -12/10/2018: Chromogranin A level normal   -12/12/2018:  24-hour urine 5-HIAA level normal   -02/15/2019: Whole-body Ga-dotatate PET/CT: No findings to suggest somatostatin receptor avid disease   -05/26/2019-05/29/2019: Brief hospitalization with early small bowel obstruction, managed conservatively   -05/28/2019: Small bowel follow-through: Prompt passage of contrast through the small bowel.  Contrast passed quickly through small bowel and reached colon within 15 minutes; further contrast progression to rectum within 1 hour.  No discrete small bowel transition point.   -06/28/2019: Octreotide scan: No evidence of neuroendocrine tumor   -07/11/2019: X-ray abdomen flat and erect (abdominal pain): No acute intra-abdominal abnormality  -11/01/2019: Multiphasic contrast-enhanced CTs abdomen and pelvis and chest CT with contrast for surveillance: No evidence of residual, recurrent, or metastatic disease in chest, abdomen, or pelvis  -02/03/2020: TTE: LVEF 55%; atrial fibrillation  -Follows up with cardiology for history of hypertension, dyslipidemia, CAD, history of NSTEMI in 2003, paroxysmal atrial fibrillation, HFpEF, second-degree AV block s/p [facility] PPM 11/13/2017 (upgraded to dual-chamber ICD 05/2018 secondary to V. fib arrest in 03/2018 in the setting of prolonged QT and hypokalemia)  -05/03/2021: Surveillance CT C/A/P with contrast (comparison: 02/02/2020): No new suspicious findings in C/A/P; decreased size of previously discussed mediastinal lymph nodes (AP window lymph nodes 6 mm, previously 8 mm)  -01/07/2022: CT A/P with contrast (comparison: 05/03/2021): Hepatic steatosis; small gallstones; retroperitoneal mildly enlarged lymph node, unchanged  -07/07/2022:  Surveillance CTs C/A/P with contrast (comparison:  CT A/P 01/07/2022; CT C/A/P 05/03/2021):  No recurrence or metastasis  -09/07/2022:  CT abdomen pelvis with contrast (abdominal pain) (comparison:  CT 07/07/2022):  -10/10/2022: Noncontrast chest CT (comparison:  07/07/2022):  No acute process  identified  1. Interval resolution of previous left lower lobe ground-glass nodule, suggestive of infectious or inflammatory etiology.  2. No evidence of new or worsening intrathoracic process.       # History of coronary artery disease, atrial fibrillation, history of cardiac arrest with ventricular fibrillation, MI in 2003, history of previous DVTs, status post CPR 03/08/2018, on anticoagulation for history of DVTs and atrial fibrillation, history of second-degree AV block requiring PPM.      12/10/2018:   Presents for initial oncology consultation, accompanied by his 3 sisters.  Overall, doing well except for postoperative abdominal pain, 4 on a scale of 1-10, which keeps improving.  Mild fatigue.  No symptoms of carcinoid syndrome like flushing, diarrhea, or bronchoconstriction.  No weakness, fatigue, malaise, fevers, chills, anorexia, unintentional weight loss, nausea, vomiting, hematemesis, melena, hematochezia, etc.    07/20/2022:  -02/03/2020: TTE: LVEF 55%; atrial fibrillation  -Follows up with cardiology for history of hypertension, dyslipidemia, CAD, history of NSTEMI in 2003, paroxysmal atrial fibrillation, HFpEF, second-degree AV block s/p [facility] PPM 11/13/2017 (upgraded to dual-chamber ICD 05/2018 secondary to V. fib arrest in 03/2018 in the setting of prolonged QT and hypokalemia)  -05/03/2021: Surveillance CT C/A/P with contrast (comparison: 02/02/2020): No new suspicious findings in C/A/P; decreased size of previously discussed mediastinal lymph nodes (AP window lymph nodes 6 mm, previously 8 mm)  -01/07/2022: CT A/P with contrast (comparison: 05/03/2021): Hepatic steatosis; small gallstones; retroperitoneal mildly enlarged lymph node, unchanged  -07/07/2022:  Surveillance CTs C/A/P with contrast (comparison:  CT A/P 01/07/2022; CT C/A/P 05/03/2021):  No recurrence or metastasis  -07/20/2022:  Labs reviewed; CBC unremarkable and; creatinine 1.39, somewhat elevated; rest of CMP essentially  unremarkable  Presents for a follow-up visit.  Doing well.  No complaints.  Good appetite.  No weakness or fatigue.  No chest pain, cough, or dyspnea.  Does not smoke.  He smoked up to 5 packs of cigarettes daily for 10-20 years, quit in 1970s.  Denies hemoptysis.  We will repeat a chest CT in 6 months for follow-up of lung nodule.    Interval history    10/13/2022:  -09/07/2022:  CT abdomen pelvis with contrast (abdominal pain) (comparison:  CT 07/07/2022):  -10/10/2022: Noncontrast chest CT (comparison:  07/07/2022):  No acute process identified  1. Interval resolution of previous left lower lobe ground-glass nodule, suggestive of infectious or inflammatory etiology.  2. No evidence of new or worsening intrathoracic process.  Presents for a follow-up visit.  Doing very well.  No complaints.  No abdominal pain, nausea, vomiting, GI bleeding, constipation, diarrhea, anorexia, unintentional weight loss, etc..  ECOG 1.      Review of systems   All systems reviewed, and found to be negative except for symptoms detailed above.  No unusual headaches, loss of consciousness, seizures, strokelike symptoms, chest pains, dyspnea, cough, hemoptysis, etc.      Physical examination:   VITAL SIGNS:  Reviewed.       GENERAL:  In no apparent distress.    HEAD:  No signs of head trauma.   EYES:  Pupils are equal.  Extraocular motions intact.    EARS:  Hearing grossly intact.   MOUTH:  Oropharynx is normal.   NECK:  No adenopathy, no JVD.     CHEST:  Chest with clear breath sounds bilaterally.  No wheezes, rales, or rhonchi.    CARDIAC:  Regular rate and rhythm.  S1 and S2, without murmurs, gallops, or rubs.   VASCULAR:  No Edema.  Peripheral pulses normal and equal in all extremities.   ABDOMEN:  Soft, without detectable tenderness.  No sign of distention.  No   rebound or guarding, and no masses palpated.   Bowel Sounds normal.  Midline surgical scar has healed well.   MUSCULOSKELETAL:  Good range of motion of all major joints.  Extremities without clubbing, cyanosis or edema.    NEUROLOGIC EXAM:  Alert and oriented x 3.  No focal sensory or strength deficits.   Speech normal.  Follows commands.   PSYCHIATRIC:  Mood normal.   SKIN:  No rash or lesions.      Assessment:  To summarize:  pT3,4 pN2 MX, at least stage III, well-differentiated neuroendocrine tumor of small bowel, multifocal, grade 1; large mesenteric mass (3.8 cm); 2:21 lymph nodes involved; small multifocal areas of tumor in the efren-intestinal adipose tissue.  Presented with small bowel obstruction.    Status post resection of 127 cm of small bowel and mesenteric mass in the base of the mesentery, on 11/02/2018.   -Colonoscopy (12/27/2018: 3 mm hyperplastic sigmoid polyp   -No somatostatin receptor avid disease on whole-body gallium dotatate PET/CT (02/15/2019)   -No evidence of neuroendocrine tumor on octreotide scan (06/28/2019)   -No evidence of disease (surveillance CTs C/A/P 11/01/2019)  -no recurrence or metastasis on surveillance CTs 07/07/2022  -no recurrence on CT A/P with contrast 09/07/2022      # left lung nodule:  -07/07/2022:  CTs C/A/P with contrast (comparison:  05/03/2021): A 4.5 mm ground-glass nodule along the left major fissure was not seen previously.  No lobar consolidation  -interval resolution of left lower lobe ground-glass nodule on follow-up noncontrast chest CT 10/10/2022      # History of coronary artery disease, atrial fibrillation, history of cardiac arrest with ventricular fibrillation, MI in 2003, history of previous DVTs, status post CPR 03/08/2018, on anticoagulation for history of DVTs and atrial fibrillation, history of second-degree AV block requiring PPM.      Plan:  -S/P resection of small bowel and mesenteric mass 11/02/2018  -continue surveillance  -From 1 year post resection - 10 years (11/2019-11/2028), every 12-24 months:  History and physical, consider biochemical markers, consider abdominal +/-pelvic multiphasic CT or MRI with  contrast, consider CT chest without contrast  -JESSIE as of 07/07/2022, 09/07/2022  >>>  -in 1 year, 09/2023, follow-up with history and physical, multiphasic CT scans of A/P with contrast, and contrast enhanced chest CT; earlier, if any symptoms of concern in the interim    Follow-up in 1 year (09/2023), with requested studies.    Above discussed with him.  All questions answered.  Discussed labs and scans and gave him copies of reports.  He understands and agrees with this plan.  ----------------------    Surveillance:   Underwent resection on 11/02/2018   1.  3-12 months post resection (until 11/2019):   -History and physical   -Consider biochemical markers as clinically indicated   -Abdominal with or without pelvic multiphasic CT or MRI with IV contrast, as clinically indicated   -Chest CT with and without contrast for primary lung/thymus tumors (as clinically indicated for primary GI tumors)     2.  >1-year post resection to 10 years:   Every 12-24 months:   -History and physical   -Consider biochemical markers as clinically indicated   -Consider abdominal with or without pelvic multiphasic CT or MRI with contrast   -Consider chest CT with and without contrast for primary lung/thymus tumors (as clinically indicated for primary GI tumors)     3. >10 years:   Consider surveillance as clinically indicated

## 2022-10-13 ENCOUNTER — OFFICE VISIT (OUTPATIENT)
Dept: HEMATOLOGY/ONCOLOGY | Facility: CLINIC | Age: 67
End: 2022-10-13
Payer: MEDICARE

## 2022-10-13 VITALS
SYSTOLIC BLOOD PRESSURE: 144 MMHG | DIASTOLIC BLOOD PRESSURE: 98 MMHG | WEIGHT: 243.81 LBS | RESPIRATION RATE: 20 BRPM | BODY MASS INDEX: 40.62 KG/M2 | HEIGHT: 65 IN | HEART RATE: 87 BPM | OXYGEN SATURATION: 100 % | TEMPERATURE: 98 F

## 2022-10-13 DIAGNOSIS — R91.1 NODULE OF LEFT LUNG: Primary | ICD-10-CM

## 2022-10-13 DIAGNOSIS — C7A.8 NEUROENDOCRINE CARCINOMA OF SMALL BOWEL: Primary | ICD-10-CM

## 2022-10-13 DIAGNOSIS — R91.1 NODULE OF LEFT LUNG: ICD-10-CM

## 2022-10-13 PROCEDURE — 99214 OFFICE O/P EST MOD 30 MIN: CPT | Mod: PBBFAC | Performed by: INTERNAL MEDICINE

## 2022-10-13 PROCEDURE — 99213 OFFICE O/P EST LOW 20 MIN: CPT | Mod: S$PBB,,, | Performed by: INTERNAL MEDICINE

## 2022-10-13 PROCEDURE — 99213 PR OFFICE/OUTPT VISIT, EST, LEVL III, 20-29 MIN: ICD-10-PCS | Mod: S$PBB,,, | Performed by: INTERNAL MEDICINE

## 2022-10-13 NOTE — Clinical Note
Orders for today:   In September 2023, follow-up with history and physical, multiphasic CT scans of A/P with contrast, and contrast-enhanced CT scan of chest

## 2022-10-19 ENCOUNTER — OFFICE VISIT (OUTPATIENT)
Dept: INTERNAL MEDICINE | Facility: CLINIC | Age: 67
End: 2022-10-19
Payer: MEDICARE

## 2022-10-19 VITALS
HEART RATE: 68 BPM | SYSTOLIC BLOOD PRESSURE: 138 MMHG | RESPIRATION RATE: 18 BRPM | TEMPERATURE: 99 F | DIASTOLIC BLOOD PRESSURE: 78 MMHG | WEIGHT: 240.5 LBS | HEIGHT: 69 IN | BODY MASS INDEX: 35.62 KG/M2

## 2022-10-19 DIAGNOSIS — Z86.79 H/O VENTRICULAR FIBRILLATION: ICD-10-CM

## 2022-10-19 DIAGNOSIS — E78.5 HYPERLIPIDEMIA LDL GOAL <70: ICD-10-CM

## 2022-10-19 DIAGNOSIS — Z11.59 NEED FOR HEPATITIS C SCREENING TEST: ICD-10-CM

## 2022-10-19 DIAGNOSIS — I25.10 CORONARY ARTERY DISEASE INVOLVING NATIVE HEART WITHOUT ANGINA PECTORIS, UNSPECIFIED VESSEL OR LESION TYPE: ICD-10-CM

## 2022-10-19 DIAGNOSIS — E78.00 HIGH CHOLESTEROL: ICD-10-CM

## 2022-10-19 DIAGNOSIS — I10 HYPERTENSION, UNSPECIFIED TYPE: Primary | ICD-10-CM

## 2022-10-19 DIAGNOSIS — Z95.810 ICD (IMPLANTABLE CARDIOVERTER-DEFIBRILLATOR) IN PLACE: ICD-10-CM

## 2022-10-19 DIAGNOSIS — Z12.11 SCREENING FOR COLON CANCER: ICD-10-CM

## 2022-10-19 PROCEDURE — 99213 OFFICE O/P EST LOW 20 MIN: CPT | Mod: PBBFAC

## 2022-10-19 RX ORDER — LOSARTAN POTASSIUM 50 MG/1
50 TABLET ORAL DAILY
Qty: 90 TABLET | Refills: 3 | Status: SHIPPED | OUTPATIENT
Start: 2022-10-19 | End: 2022-10-21 | Stop reason: SDUPTHER

## 2022-10-19 RX ORDER — HYOSCYAMINE SULFATE 0.125 MG
125 TABLET ORAL EVERY 6 HOURS PRN
Qty: 30 TABLET | Refills: 0 | Status: SHIPPED | OUTPATIENT
Start: 2022-10-19 | End: 2022-10-21 | Stop reason: SDUPTHER

## 2022-10-19 RX ORDER — DILTIAZEM HYDROCHLORIDE 180 MG/1
180 CAPSULE, COATED, EXTENDED RELEASE ORAL DAILY
Qty: 90 CAPSULE | Refills: 3 | Status: SHIPPED | OUTPATIENT
Start: 2022-10-19 | End: 2022-10-21 | Stop reason: SDUPTHER

## 2022-10-19 RX ORDER — OMEPRAZOLE 20 MG/1
20 CAPSULE, DELAYED RELEASE ORAL DAILY
Qty: 30 CAPSULE | Refills: 2 | Status: ON HOLD | OUTPATIENT
Start: 2022-10-19 | End: 2023-08-16 | Stop reason: HOSPADM

## 2022-10-19 RX ORDER — RIVAROXABAN 20 MG/1
TABLET, FILM COATED ORAL
Qty: 90 TABLET | Refills: 3 | Status: SHIPPED | OUTPATIENT
Start: 2022-10-19 | End: 2022-10-21 | Stop reason: SDUPTHER

## 2022-10-19 RX ORDER — CHOLECALCIFEROL (VITAMIN D3) 25 MCG
1000 TABLET ORAL DAILY
Qty: 30 TABLET | Refills: 5 | Status: SHIPPED | OUTPATIENT
Start: 2022-10-19 | End: 2023-06-22 | Stop reason: SDUPTHER

## 2022-10-19 RX ORDER — NAPROXEN SODIUM 220 MG/1
TABLET, FILM COATED ORAL
Qty: 90 TABLET | Refills: 3 | Status: SHIPPED | OUTPATIENT
Start: 2022-10-19 | End: 2022-10-21 | Stop reason: SDUPTHER

## 2022-10-19 RX ORDER — SPIRONOLACTONE 50 MG/1
50 TABLET, FILM COATED ORAL DAILY
Qty: 90 TABLET | Refills: 3 | Status: SHIPPED | OUTPATIENT
Start: 2022-10-19 | End: 2022-10-21 | Stop reason: SDUPTHER

## 2022-10-19 RX ORDER — ATORVASTATIN CALCIUM 40 MG/1
40 TABLET, FILM COATED ORAL DAILY
Qty: 90 TABLET | Refills: 0 | Status: SHIPPED | OUTPATIENT
Start: 2022-10-19 | End: 2022-10-21 | Stop reason: SDUPTHER

## 2022-10-19 RX ORDER — NIFEDIPINE 30 MG/1
30 TABLET, FILM COATED, EXTENDED RELEASE ORAL DAILY
Qty: 90 TABLET | Refills: 3 | Status: SHIPPED | OUTPATIENT
Start: 2022-10-19 | End: 2023-02-09 | Stop reason: SDUPTHER

## 2022-10-19 NOTE — PROGRESS NOTES
Chief Complaint  Follow-up     HPI  Delio Daniel Jr. is a 67 y.o. male who has a past medical history of Atrial fibrillation, BPH (benign prostatic hyperplasia), Cardiac arrest, Coronary artery disease, Cyst, kidney, acquired, Diverticulosis, Hyperlipidemia, Hypertension, MI (myocardial infarction), Obesity, and Steatosis of liver.  He presents to clinic today for follow-up of chronic medical conditions.  He has no acute complaints today.  He continues to follow-up with Cardiology and Oncology.  He endorses medication compliance.    ROS  12 point ROS negative unless otherwise stated above    PE  Vitals:    10/19/22 1246   BP: 138/78   Pulse: 68   Resp: 18   Temp: 98.8 °F (37.1 °C)      Physical Exam  Vitals and nursing note reviewed.   Constitutional:       General: He is awake. He is not in acute distress.     Appearance: Normal appearance. He is well-developed and well-groomed. He is obese. He is not ill-appearing, toxic-appearing or diaphoretic.   HENT:      Head: Normocephalic and atraumatic.      Right Ear: External ear normal.      Left Ear: External ear normal.      Mouth/Throat:      Mouth: Mucous membranes are moist.      Pharynx: Oropharynx is clear.   Eyes:      General: Lids are normal. Vision grossly intact.      Extraocular Movements: Extraocular movements intact.      Conjunctiva/sclera: Conjunctivae normal.      Pupils: Pupils are equal, round, and reactive to light.   Neck:      Trachea: Trachea normal.   Cardiovascular:      Rate and Rhythm: Normal rate. Rhythm irregular.      Chest Wall: PMI is not displaced.      Pulses: Normal pulses.      Heart sounds: Normal heart sounds, S1 normal and S2 normal. No murmur heard.    No gallop.   Pulmonary:      Effort: Pulmonary effort is normal. No respiratory distress.      Breath sounds: Normal breath sounds. No decreased breath sounds, wheezing, rhonchi or rales.   Chest:      Chest wall: No tenderness.   Abdominal:      General: Bowel sounds are normal.  There is no distension.      Palpations: Abdomen is soft. There is no mass.      Tenderness: There is no abdominal tenderness.   Musculoskeletal:         General: No swelling. Normal range of motion.      Cervical back: Normal range of motion.      Right lower leg: No edema.      Left lower leg: No edema.   Skin:     General: Skin is warm and dry.      Coloration: Skin is not cyanotic, jaundiced or pale.   Neurological:      General: No focal deficit present.      Mental Status: He is alert and oriented to person, place, and time.      Sensory: Sensation is intact.      Motor: Motor function is intact.      Coordination: Coordination is intact.      Gait: Gait is intact.   Psychiatric:         Behavior: Behavior is cooperative.        Assessment/Plan  CAD with history of NSTEMI in 2003   -Continue atorvastatin 40 mg daily and aspirin 81 mg daily   -Denies CP     A fib   -Currently asymptomatic, rate controlled   -Continue metoprolol succinate 100 mg daily.   -Continue diltiazem 180mg daily and Xarelto 20 mg daily      Second-degree AV block s/p pacemaker (2017)   -Continue follow-up with [facility] cardiology      Hypertension   -Blood pressure today 138/78      Abdominal pain in the setting of history of Small bowel carcinoid tumor s/p resection (2018)   -Currently followed by [facility] oncology and [facility] oncology      RTC in 4 months.      Future Appointments   Date Time Provider Department Center   10/19/2022  2:50 PM RESIDENT 31, [facility] INTERNAL MEDICINE [facility] IM RES [place]    11/10/2022  8:00 AM NURSE, [facility] CARDIOLOGY [facility] CARD [place]    11/10/2022  9:00 AM PACEMAKER, [facility] CARDIOLOGY [facility] CARD [place]    2/8/2023  8:30 AM Cyndie Villegas MD [facility] CARD [place]    7/3/2023  9:00 AM [facility] CT1 450 LB LIMIT [facility] CTSCN [place]    7/3/2023  9:30 AM [facility] CT1 450 LB LIMIT [facility] CTSCN [place]    7/20/2023 12:00 PM NURSE, [facility] HEMATOLOGY ONCOLOGY [facility] HEMOMC [place]    7/20/2023  1:00 PM Lukas Reed MD  [facility] HEMOM [place], DO  Internal Medicine - PGY-2

## 2022-10-21 RX ORDER — RIVAROXABAN 20 MG/1
TABLET, FILM COATED ORAL
Qty: 90 TABLET | Refills: 3 | Status: SHIPPED | OUTPATIENT
Start: 2022-10-21 | End: 2023-02-09 | Stop reason: SDUPTHER

## 2022-10-21 RX ORDER — NAPROXEN SODIUM 220 MG/1
TABLET, FILM COATED ORAL
Qty: 90 TABLET | Refills: 3 | Status: SHIPPED | OUTPATIENT
Start: 2022-10-21 | End: 2023-11-15 | Stop reason: SDUPTHER

## 2022-10-21 RX ORDER — DILTIAZEM HYDROCHLORIDE 180 MG/1
180 CAPSULE, COATED, EXTENDED RELEASE ORAL DAILY
Qty: 90 CAPSULE | Refills: 3 | Status: SHIPPED | OUTPATIENT
Start: 2022-10-21 | End: 2023-02-09 | Stop reason: SDUPTHER

## 2022-10-21 RX ORDER — LOSARTAN POTASSIUM 50 MG/1
50 TABLET ORAL DAILY
Qty: 90 TABLET | Refills: 3 | Status: SHIPPED | OUTPATIENT
Start: 2022-10-21 | End: 2023-02-09 | Stop reason: SDUPTHER

## 2022-10-21 RX ORDER — SPIRONOLACTONE 50 MG/1
50 TABLET, FILM COATED ORAL DAILY
Qty: 90 TABLET | Refills: 3 | Status: SHIPPED | OUTPATIENT
Start: 2022-10-21 | End: 2023-02-09 | Stop reason: SDUPTHER

## 2022-10-21 RX ORDER — HYOSCYAMINE SULFATE 0.125 MG
125 TABLET ORAL EVERY 6 HOURS PRN
Qty: 30 TABLET | Refills: 0 | Status: SHIPPED | OUTPATIENT
Start: 2022-10-21 | End: 2022-10-31

## 2022-10-21 RX ORDER — ATORVASTATIN CALCIUM 40 MG/1
40 TABLET, FILM COATED ORAL DAILY
Qty: 90 TABLET | Refills: 0 | Status: SHIPPED | OUTPATIENT
Start: 2022-10-21 | End: 2023-02-09 | Stop reason: SDUPTHER

## 2022-10-26 ENCOUNTER — HOSPITAL ENCOUNTER (EMERGENCY)
Facility: HOSPITAL | Age: 67
Discharge: HOME | End: 2022-10-26
Attending: FAMILY MEDICINE
Payer: MEDICARE

## 2022-10-26 VITALS
DIASTOLIC BLOOD PRESSURE: 104 MMHG | OXYGEN SATURATION: 99 % | WEIGHT: 240 LBS | TEMPERATURE: 99 F | SYSTOLIC BLOOD PRESSURE: 154 MMHG | HEIGHT: 69 IN | HEART RATE: 85 BPM | BODY MASS INDEX: 35.55 KG/M2 | RESPIRATION RATE: 16 BRPM

## 2022-10-26 DIAGNOSIS — K52.9 GASTROENTERITIS: ICD-10-CM

## 2022-10-26 DIAGNOSIS — R11.2 NAUSEA & VOMITING: Primary | ICD-10-CM

## 2022-10-26 LAB
ALBUMIN SERPL-MCNC: 4 GM/DL (ref 3.4–4.8)
ALBUMIN/GLOB SERPL: 1.3 RATIO (ref 1.1–2)
ALP SERPL-CCNC: 60 UNIT/L (ref 40–150)
ALT SERPL-CCNC: 14 UNIT/L (ref 0–55)
AST SERPL-CCNC: 15 UNIT/L (ref 5–34)
BASOPHILS # BLD AUTO: 0.06 X10(3)/MCL (ref 0–0.2)
BASOPHILS NFR BLD AUTO: 0.9 %
BILIRUBIN DIRECT+TOT PNL SERPL-MCNC: 1.4 MG/DL
BUN SERPL-MCNC: 14.9 MG/DL (ref 8.4–25.7)
CALCIUM SERPL-MCNC: 9.2 MG/DL (ref 8.8–10)
CHLORIDE SERPL-SCNC: 109 MMOL/L (ref 98–107)
CO2 SERPL-SCNC: 21 MMOL/L (ref 23–31)
CREAT SERPL-MCNC: 1.37 MG/DL (ref 0.73–1.18)
EOSINOPHIL # BLD AUTO: 0.06 X10(3)/MCL (ref 0–0.9)
EOSINOPHIL NFR BLD AUTO: 0.9 %
ERYTHROCYTE [DISTWIDTH] IN BLOOD BY AUTOMATED COUNT: 13.5 % (ref 11.5–17)
GFR SERPLBLD CREATININE-BSD FMLA CKD-EPI: 57 MLS/MIN/1.73/M2
GLOBULIN SER-MCNC: 3 GM/DL (ref 2.4–3.5)
GLUCOSE SERPL-MCNC: 108 MG/DL (ref 82–115)
HCT VFR BLD AUTO: 43.5 % (ref 42–52)
HGB BLD-MCNC: 14.9 GM/DL (ref 14–18)
IMM GRANULOCYTES # BLD AUTO: 0.04 X10(3)/MCL (ref 0–0.04)
IMM GRANULOCYTES NFR BLD AUTO: 0.6 %
LIPASE SERPL-CCNC: 28 U/L
LYMPHOCYTES # BLD AUTO: 1.8 X10(3)/MCL (ref 0.6–4.6)
LYMPHOCYTES NFR BLD AUTO: 27.4 %
MCH RBC QN AUTO: 29.6 PG (ref 27–31)
MCHC RBC AUTO-ENTMCNC: 34.3 MG/DL (ref 33–36)
MCV RBC AUTO: 86.3 FL (ref 80–94)
MONOCYTES # BLD AUTO: 0.62 X10(3)/MCL (ref 0.1–1.3)
MONOCYTES NFR BLD AUTO: 9.5 %
NEUTROPHILS # BLD AUTO: 4 X10(3)/MCL (ref 2.1–9.2)
NEUTROPHILS NFR BLD AUTO: 60.7 %
NRBC BLD AUTO-RTO: 0 %
PLATELET # BLD AUTO: 246 X10(3)/MCL (ref 130–400)
PMV BLD AUTO: 10.1 FL (ref 7.4–10.4)
POTASSIUM SERPL-SCNC: 3.8 MMOL/L (ref 3.5–5.1)
PROT SERPL-MCNC: 7 GM/DL (ref 5.8–7.6)
RBC # BLD AUTO: 5.04 X10(6)/MCL (ref 4.7–6.1)
SODIUM SERPL-SCNC: 143 MMOL/L (ref 136–145)
TROPONIN I SERPL-MCNC: <0.01 NG/ML (ref 0–0.04)
WBC # SPEC AUTO: 6.6 X10(3)/MCL (ref 4.5–11.5)

## 2022-10-26 PROCEDURE — 83690 ASSAY OF LIPASE: CPT | Performed by: FAMILY MEDICINE

## 2022-10-26 PROCEDURE — 80053 COMPREHEN METABOLIC PANEL: CPT | Performed by: FAMILY MEDICINE

## 2022-10-26 PROCEDURE — 85025 COMPLETE CBC W/AUTO DIFF WBC: CPT | Performed by: FAMILY MEDICINE

## 2022-10-26 PROCEDURE — 93005 ELECTROCARDIOGRAM TRACING: CPT

## 2022-10-26 PROCEDURE — 99285 EMERGENCY DEPT VISIT HI MDM: CPT | Mod: 25

## 2022-10-26 PROCEDURE — 25000003 PHARM REV CODE 250: Performed by: FAMILY MEDICINE

## 2022-10-26 PROCEDURE — 36415 COLL VENOUS BLD VENIPUNCTURE: CPT | Performed by: FAMILY MEDICINE

## 2022-10-26 PROCEDURE — 84484 ASSAY OF TROPONIN QUANT: CPT | Performed by: FAMILY MEDICINE

## 2022-10-26 RX ORDER — MAG HYDROX/ALUMINUM HYD/SIMETH 200-200-20
30 SUSPENSION, ORAL (FINAL DOSE FORM) ORAL
Status: COMPLETED | OUTPATIENT
Start: 2022-10-26 | End: 2022-10-26

## 2022-10-26 RX ORDER — ONDANSETRON 4 MG/1
4 TABLET, ORALLY DISINTEGRATING ORAL EVERY 8 HOURS PRN
Qty: 20 TABLET | Refills: 0 | OUTPATIENT
Start: 2022-10-26 | End: 2023-03-14

## 2022-10-26 RX ORDER — LIDOCAINE HYDROCHLORIDE 20 MG/ML
10 SOLUTION OROPHARYNGEAL
Status: COMPLETED | OUTPATIENT
Start: 2022-10-26 | End: 2022-10-26

## 2022-10-26 RX ADMIN — ALUMINUM HYDROXIDE, MAGNESIUM HYDROXIDE, AND DIMETHICONE 30 ML: 200; 20; 200 SUSPENSION ORAL at 06:10

## 2022-10-26 RX ADMIN — LIDOCAINE HYDROCHLORIDE 10 ML: 20 SOLUTION ORAL at 06:10

## 2022-10-26 NOTE — ED PROVIDER NOTES
Encounter Date: 10/26/2022       History     Chief Complaint   Patient presents with    Abdominal Pain    Vomiting     States abdominal pain and vomiting since 4 this am.       67-year-old gentleman presents emergency room complaints of nausea and vomiting which began acutely around 1:00 a.m..  Patient reports having 3 episodes of nausea and vomiting.  Patient denies constipation or diarrhea.  Reports that he ate dinner late last night prior to going to sleep which he feels contributed to his discomfort and nausea/vomiting.  Patient received Zofran IM the EMS with improved symptoms.  Patient has chest pain.  No shortness of breath.    The history is provided by the patient.   Review of patient's allergies indicates:  No Known Allergies  Past Medical History:   Diagnosis Date    Arthritis     Atrial fibrillation     BPH (benign prostatic hyperplasia)     Cardiac arrest     Coronary artery disease     Cyst, kidney, acquired     Diverticulosis     Hyperlipidemia     Hypertension     MI (myocardial infarction)     Obesity     Steatosis of liver      Past Surgical History:   Procedure Laterality Date    A-V CARDIAC PACEMAKER INSERTION Right     CARDIAC CATHETERIZATION      COLONOSCOPY W/ BIOPSIES      excision of colon       Family History   Problem Relation Age of Onset    Hypertension Mother     Hypertension Father     Hypertension Sister      Social History     Tobacco Use    Smoking status: Former    Smokeless tobacco: Never   Substance Use Topics    Alcohol use: Not Currently    Drug use: Not Currently     Review of Systems   Constitutional:  Negative for chills, fatigue and fever.   HENT:  Negative for ear pain, rhinorrhea and sore throat.    Eyes:  Negative for photophobia and pain.   Respiratory:  Negative for cough, shortness of breath and wheezing.    Cardiovascular:  Negative for chest pain.   Gastrointestinal:  Positive for nausea and vomiting. Negative for abdominal pain and diarrhea.        Last bowel  movement yesterday; normal.   Genitourinary:  Negative for dysuria.   Neurological:  Negative for dizziness, weakness and headaches.   All other systems reviewed and are negative.    Physical Exam     Initial Vitals [10/26/22 0538]   BP Pulse Resp Temp SpO2   (!) 171/108 78 18 98.6 °F (37 °C) 98 %      MAP       --         Physical Exam    Nursing note and vitals reviewed.  Constitutional: He appears well-developed and well-nourished.   HENT:   Head: Normocephalic and atraumatic.   Eyes: EOM are normal. Pupils are equal, round, and reactive to light.   Neck: Neck supple.   Normal range of motion.  Cardiovascular:  Normal rate, regular rhythm and normal heart sounds.     Exam reveals no gallop and no friction rub.       No murmur heard.  Pulmonary/Chest: Breath sounds normal. No respiratory distress.   Abdominal: Abdomen is soft. Bowel sounds are normal. He exhibits no distension. There is no abdominal tenderness.   Abdomen slightly distended, non-tender with good bowel sounds.   Musculoskeletal:         General: Normal range of motion.      Cervical back: Normal range of motion and neck supple.     Neurological: He is alert and oriented to person, place, and time. He has normal strength.   Skin: Skin is warm and dry.   Psychiatric: He has a normal mood and affect. His behavior is normal. Judgment and thought content normal.       ED Course   Procedures  Labs Reviewed   COMPREHENSIVE METABOLIC PANEL - Abnormal; Notable for the following components:       Result Value    Chloride 109 (*)     Carbon Dioxide 21 (*)     Creatinine 1.37 (*)     All other components within normal limits   LIPASE - Normal   TROPONIN I - Normal   CBC W/ AUTO DIFFERENTIAL    Narrative:     The following orders were created for panel order CBC Auto Differential.  Procedure                               Abnormality         Status                     ---------                               -----------         ------                     CBC with  Differential[[id number]]                            Final result                 Please view results for these tests on the individual orders.   CBC WITH DIFFERENTIAL   EXTRA TUBES    Narrative:     The following orders were created for panel order EXTRA TUBES.  Procedure                               Abnormality         Status                     ---------                               -----------         ------                     Light Blue Top Hold[[id number]]                              In process                 Gold Top Hold[[id number]]                                    In process                   Please view results for these tests on the individual orders.   LIGHT BLUE TOP HOLD   GOLD TOP HOLD     EKG Readings: (Independently Interpreted)   10/26/2022 6:03 AM  Rate: 82 bpm  Rhythm: atrial fibrillation  Axis: Rightward  Intervals: Normal  ST Changes: Nonspecific  Impression: Atrial fibrillation with nonspecific ST changes.       ECG Results              EKG 12-lead (In process)  Result time 10/26/22 06:45:13      In process by Interface, Lab In [facility] (10/26/22 06:45:13)                   Narrative:    Test Reason : R11.2,    Vent. Rate : 082 BPM     Atrial Rate : 500 BPM     P-R Int : 000 ms          QRS Dur : 088 ms      QT Int : 386 ms       P-R-T Axes : 000 103 247 degrees     QTc Int : 450 ms    Atrial fibrillation  Rightward axis  Possible Anterior infarct ,age undetermined  T wave abnormality, consider inferolateral ischemia  Abnormal ECG  When compared with ECG of 08-SEP-2022 08:44,  Borderline criteria for Anterior infarct are now Present    Referred By: UNKNOWN REFERRING           Confirmed By:                                   Imaging Results              X-Ray Abdomen Flat And Erect (Final result)  Result time 10/26/22 06:35:01      Final result by Laureen Christina MD (10/26/22 06:35:01)                   Impression:      Nonspecific bowel gas pattern with paucity of bowel  gas.      Electronically signed by: Laureen Christina  Date:    10/26/2022  Time:    06:35               Narrative:    EXAMINATION:  XR ABDOMEN FLAT AND ERECT    CLINICAL HISTORY:  Nausea with vomiting, unspecified    TECHNIQUE:  Supine and upright views of the abdomen performed.    COMPARISON:  10/01/2022    FINDINGS:  Nonspecific bowel gas pattern with paucity of small bowel gas.There is a single air-fluid level in the right lower quadrant.    No evidence of free intraperitoneal air.    No appreciable acute osseous abnormality.                                       X-Ray Chest 1 View (Final result)  Result time 10/26/22 06:34:11      Final result by Laureen Christina MD (10/26/22 06:34:11)                   Impression:      No acute cardiopulmonary abnormality.      Electronically signed by: Laureen Christina  Date:    10/26/2022  Time:    06:34               Narrative:    EXAMINATION:  XR CHEST 1 VIEW    CLINICAL HISTORY:  Nausea and vomiting;    TECHNIQUE:  Single frontal view of the chest was performed.    COMPARISON:  09/07/2022 plain radiograph, CT 10/10/2022    FINDINGS:  LINES AND TUBES: Dual lead cardiac pacer device is in place via left subclavian approach with leads overlying the right atrium and right ventricle.    MEDIASTINUM AND MARJORIE: Cardiac silhouette is enlarged.    LUNGS: Vague opacity projecting over the left lung apex corresponds with soft tissue calcifications on prior CT exam.  No acute airspace opacity.    PLEURA:No pleural effusion. No pneumothorax.    BONES: No acute osseous abnormality.                                       Medications   LIDOcaine HCl 2% oral solution 10 mL (10 mLs Oral Given 10/26/22 0645)   aluminum-magnesium hydroxide-simethicone 200-200-20 mg/5 mL suspension 30 mL (30 mLs Oral Given 10/26/22 0647)                 ED Course as of 10/26/22 0755   Wed Oct 26, 2022   0724 Patient care taken over from Dr. Hernandez at 0700 pending labs.  [KD]   0750 Patient re-evaluated  at this time. He reports feeling significantly better. Drinking water without nausea or vomiting. Requesting discharge. Labs unremarkable. Stable for discharge with PCP follow up. Strict return precautions discussed. Patient verbalized understanding. All questions answered.  [KD]      ED Course User Index  [KD] Ning Stoner PA-C                 Clinical Impression:   Final diagnoses:  [R11.2] Nausea & vomiting (Primary)  [K52.9] Gastroenteritis        ED Disposition Condition    Discharge Stable          ED Prescriptions       Medication Sig Dispense Start Date End Date Auth. Provider    ondansetron (ZOFRAN-ODT) 4 MG TbDL Take 1 tablet (4 mg total) by mouth every 8 (eight) hours as needed (nausea/vomiting). 20 tablet 10/26/2022 -- Ning Stoner PA-C          Follow-up Information       Follow up With Specialties Details Why Contact Info    [facility] Emergency Medicine  If symptoms worsen [address] [facility] [id number] [phone]    PCP  In 3 days Hospital follow up              Ning Stoner PA-C  10/26/22 5454

## 2022-10-31 ENCOUNTER — HOSPITAL ENCOUNTER (EMERGENCY)
Facility: HOSPITAL | Age: 67
Discharge: HOME | End: 2022-10-31
Attending: INTERNAL MEDICINE
Payer: MEDICARE

## 2022-10-31 VITALS
OXYGEN SATURATION: 97 % | WEIGHT: 240 LBS | HEART RATE: 67 BPM | TEMPERATURE: 98 F | SYSTOLIC BLOOD PRESSURE: 136 MMHG | DIASTOLIC BLOOD PRESSURE: 78 MMHG | HEIGHT: 69 IN | BODY MASS INDEX: 35.55 KG/M2 | RESPIRATION RATE: 18 BRPM

## 2022-10-31 DIAGNOSIS — M54.50 LOW BACK PAIN, UNSPECIFIED BACK PAIN LATERALITY, UNSPECIFIED CHRONICITY, UNSPECIFIED WHETHER SCIATICA PRESENT: Primary | ICD-10-CM

## 2022-10-31 PROCEDURE — 99283 EMERGENCY DEPT VISIT LOW MDM: CPT | Mod: 25

## 2022-10-31 PROCEDURE — 25000003 PHARM REV CODE 250: Performed by: PHYSICIAN ASSISTANT

## 2022-10-31 RX ORDER — METHOCARBAMOL 500 MG/1
500 TABLET, FILM COATED ORAL
Status: COMPLETED | OUTPATIENT
Start: 2022-10-31 | End: 2022-10-31

## 2022-10-31 RX ORDER — KETOROLAC TROMETHAMINE 10 MG/1
10 TABLET, FILM COATED ORAL
Status: COMPLETED | OUTPATIENT
Start: 2022-10-31 | End: 2022-10-31

## 2022-10-31 RX ADMIN — KETOROLAC TROMETHAMINE 10 MG: 10 TABLET, FILM COATED ORAL at 07:10

## 2022-10-31 RX ADMIN — METHOCARBAMOL 500 MG: 500 TABLET ORAL at 07:10

## 2022-11-01 NOTE — ED PROVIDER NOTES
"Encounter Date: 10/31/2022       History     Chief Complaint   Patient presents with    Back Pain     To the ED with c/o atraumatic back pain that started "when I was laying down today".     Patient reports to the ER with exacerbation of chronic lower back pain for the past day; pt denies any recent fall or injury and is ambulatory  with no visualized gait disturbance    The history is provided by the patient.   Back Pain   This is a recurrent problem. The current episode started today. The problem occurs throughout the day. The pain is associated with no known injury. The pain is present in the lumbar spine. The quality of the pain is described as aching. The pain is at a severity of 3/10. Pertinent negatives include no chest pain, no fever, no weight loss, no abdominal pain, no bowel incontinence, no perianal numbness, no bladder incontinence, no dysuria, no pelvic pain, no leg pain, no tingling and no weakness. He has tried bed rest for the symptoms. The treatment provided mild relief.   Review of patient's allergies indicates:  No Known Allergies  Past Medical History:   Diagnosis Date    Arthritis     Atrial fibrillation     BPH (benign prostatic hyperplasia)     Cardiac arrest     Coronary artery disease     Cyst, kidney, acquired     Diverticulosis     Hyperlipidemia     Hypertension     MI (myocardial infarction)     Obesity     Steatosis of liver      Past Surgical History:   Procedure Laterality Date    A-V CARDIAC PACEMAKER INSERTION Right     CARDIAC CATHETERIZATION      COLONOSCOPY W/ BIOPSIES      excision of colon       Family History   Problem Relation Age of Onset    Hypertension Mother     Hypertension Father     Hypertension Sister      Social History     Tobacco Use    Smoking status: Former    Smokeless tobacco: Never   Substance Use Topics    Alcohol use: Not Currently    Drug use: Not Currently     Review of Systems   Constitutional:  Negative for fever and weight loss.   HENT:  Negative for " sore throat.    Respiratory:  Negative for shortness of breath.    Cardiovascular:  Negative for chest pain.   Gastrointestinal:  Negative for abdominal pain, bowel incontinence and nausea.   Genitourinary:  Negative for bladder incontinence, dysuria and pelvic pain.   Musculoskeletal:  Positive for back pain.   Skin:  Negative for rash.   Neurological:  Negative for tingling and weakness.   Hematological:  Does not bruise/bleed easily.   Psychiatric/Behavioral: Negative.       Physical Exam     Initial Vitals [10/31/22 1648]   BP Pulse Resp Temp SpO2   134/89 68 18 98.4 °F (36.9 °C) 96 %      MAP       --         Physical Exam    Vitals reviewed.  Constitutional: He appears well-developed.   HENT:   Head: Normocephalic and atraumatic.   Eyes: Conjunctivae and EOM are normal. Pupils are equal, round, and reactive to light.   Neck:   Normal range of motion.  Cardiovascular:  Normal rate, regular rhythm and normal heart sounds.           Pulmonary/Chest: Breath sounds normal. He exhibits no tenderness.   Abdominal: Abdomen is soft. Bowel sounds are normal. He exhibits no distension. There is no abdominal tenderness.   Musculoskeletal:      Cervical back: Normal and normal range of motion.      Thoracic back: Normal.      Lumbar back: Tenderness present. Decreased range of motion.        Back:      Neurological: He is alert and oriented to person, place, and time. He displays normal reflexes. No cranial nerve deficit or sensory deficit. GCS score is 15. GCS eye subscore is 4. GCS verbal subscore is 5. GCS motor subscore is 6.   Skin: Skin is warm. No pallor.   Psychiatric: He has a normal mood and affect. His behavior is normal. Judgment and thought content normal.       ED Course   Procedures  Labs Reviewed - No data to display       Imaging Results    None          Medications   methocarbamoL tablet 500 mg (500 mg Oral Given 10/31/22 1918)   ketorolac tablet 10 mg (10 mg Oral Given 10/31/22 1918)                               Clinical Impression:   Final diagnoses:  [M54.50] Low back pain, unspecified back pain laterality, unspecified chronicity, unspecified whether sciatica present (Primary)        ED Disposition Condition    Discharge Stable          ED Prescriptions    None       Follow-up Information       Follow up With Specialties Details Why Contact Info    discharge followup    If your symptoms become WORSE or you DO NOT IMPROVE and you are unable to reach your health care provider, you should RETURN to the emergency department    discharge info    Discussed all pertinent ED information, results, diagnosis and treatment plan; All questions and concerns were addressed at this time. Patient voices understanding of information and instructions. Patient is comfortable with plan and discharge             KAVIN Vences  10/31/22 1954

## 2022-11-08 ENCOUNTER — LAB VISIT (OUTPATIENT)
Dept: LAB | Facility: HOSPITAL | Age: 67
End: 2022-11-08
Attending: INTERNAL MEDICINE
Payer: MEDICARE

## 2022-11-08 DIAGNOSIS — Z11.59 NEED FOR HEPATITIS C SCREENING TEST: ICD-10-CM

## 2022-11-08 DIAGNOSIS — C7A.8 PRIMARY NEUROENDOCRINE CARCINOMA OF COLON: ICD-10-CM

## 2022-11-08 LAB — HCV AB SERPL QL IA: NONREACTIVE

## 2022-11-08 PROCEDURE — 86803 HEPATITIS C AB TEST: CPT

## 2022-11-08 PROCEDURE — 86316 IMMUNOASSAY TUMOR OTHER: CPT

## 2022-11-09 LAB — CGA SERPL-MCNC: 51 NG/ML

## 2022-11-10 ENCOUNTER — CLINICAL SUPPORT (OUTPATIENT)
Dept: CARDIOLOGY | Facility: CLINIC | Age: 67
End: 2022-11-10
Payer: MEDICARE

## 2022-11-10 VITALS
HEIGHT: 68 IN | RESPIRATION RATE: 18 BRPM | TEMPERATURE: 98 F | OXYGEN SATURATION: 99 % | WEIGHT: 242.31 LBS | DIASTOLIC BLOOD PRESSURE: 96 MMHG | SYSTOLIC BLOOD PRESSURE: 129 MMHG | HEART RATE: 66 BPM | BODY MASS INDEX: 36.72 KG/M2

## 2022-11-10 DIAGNOSIS — I10 HYPERTENSION, UNSPECIFIED TYPE: Primary | ICD-10-CM

## 2022-11-10 DIAGNOSIS — I25.10 CORONARY ARTERY DISEASE INVOLVING NATIVE HEART WITHOUT ANGINA PECTORIS, UNSPECIFIED VESSEL OR LESION TYPE: ICD-10-CM

## 2022-11-10 PROCEDURE — 99211 OFF/OP EST MAY X REQ PHY/QHP: CPT | Mod: PBBFAC

## 2022-11-10 PROCEDURE — 93283 PRGRMG EVAL IMPLANTABLE DFB: CPT | Mod: PBBFAC | Performed by: INTERNAL MEDICINE

## 2022-11-10 PROCEDURE — 99214 OFFICE O/P EST MOD 30 MIN: CPT | Mod: PBBFAC,27

## 2022-11-10 RX ORDER — METOPROLOL SUCCINATE 200 MG/1
200 TABLET, EXTENDED RELEASE ORAL 2 TIMES DAILY
Qty: 90 TABLET | Refills: 3 | Status: SHIPPED | OUTPATIENT
Start: 2022-11-10 | End: 2023-02-09 | Stop reason: SDUPTHER

## 2022-11-10 RX ORDER — METOPROLOL SUCCINATE 200 MG/1
200 TABLET, EXTENDED RELEASE ORAL 2 TIMES DAILY
Qty: 90 TABLET | Refills: 3 | Status: SHIPPED | OUTPATIENT
Start: 2022-11-10 | End: 2022-11-10

## 2022-11-10 NOTE — PROGRESS NOTES
Pt had device interrogation today that revealed 32 episodes of NSVT (no EGMs available) and 12 SVT episodes in VT zone 1. No shocks.    I spoke with the pt, he has not been having any symptoms. He reports compliance with toprol 100mg bid (which was recently increased). Will further increase to 200mg bid.    *Can consider AVN ablation and BiV upgrade in the future.     Cyndie Villegas MD  Cardiology staff

## 2022-11-10 NOTE — PROGRESS NOTES
Pt seen in clinic today for b/p check. Pt denies cardiac targets at this time. Pt states he is medication compliant. LCV pt pot level was 3.1 labs redrawn before this visit pot level3.8. LCV b/p not at goal at 142/92. Today b/p 129/96 15 minute recheck b/p 130/80. Visit presented to Dr Villegas no new orders at this time. Continue current regimen and keep all f/u appointments. However pt had device interrogation after this visit which was presented to Dr Villegas new orders received to increase metoprolol to 200 mg bid. Pt was made aware.

## 2022-11-26 ENCOUNTER — HOSPITAL ENCOUNTER (EMERGENCY)
Facility: HOSPITAL | Age: 67
Discharge: HOME | End: 2022-11-26
Attending: FAMILY MEDICINE
Payer: MEDICARE

## 2022-11-26 VITALS
SYSTOLIC BLOOD PRESSURE: 141 MMHG | DIASTOLIC BLOOD PRESSURE: 89 MMHG | HEIGHT: 69 IN | OXYGEN SATURATION: 99 % | HEART RATE: 79 BPM | RESPIRATION RATE: 18 BRPM | WEIGHT: 240.06 LBS | BODY MASS INDEX: 35.56 KG/M2 | TEMPERATURE: 97 F

## 2022-11-26 DIAGNOSIS — M54.9 BACK PAIN, UNSPECIFIED BACK LOCATION, UNSPECIFIED BACK PAIN LATERALITY, UNSPECIFIED CHRONICITY: Primary | ICD-10-CM

## 2022-11-26 PROCEDURE — 99284 EMERGENCY DEPT VISIT MOD MDM: CPT | Mod: 25

## 2022-11-26 PROCEDURE — 63600175 PHARM REV CODE 636 W HCPCS: Performed by: PHYSICIAN ASSISTANT

## 2022-11-26 PROCEDURE — 96372 THER/PROPH/DIAG INJ SC/IM: CPT | Performed by: PHYSICIAN ASSISTANT

## 2022-11-26 RX ORDER — METHOCARBAMOL 100 MG/ML
500 INJECTION, SOLUTION INTRAMUSCULAR; INTRAVENOUS
Status: COMPLETED | OUTPATIENT
Start: 2022-11-26 | End: 2022-11-26

## 2022-11-26 RX ORDER — ACETAMINOPHEN AND CODEINE PHOSPHATE 300; 30 MG/1; MG/1
1 TABLET ORAL EVERY 6 HOURS PRN
Qty: 12 TABLET | Refills: 0 | Status: SHIPPED | OUTPATIENT
Start: 2022-11-26 | End: 2022-12-01

## 2022-11-26 RX ADMIN — METHOCARBAMOL 500 MG: 100 INJECTION INTRAMUSCULAR; INTRAVENOUS at 02:11

## 2022-11-26 NOTE — ED PROVIDER NOTES
Encounter Date: 11/26/2022       History     Chief Complaint   Patient presents with    Back Pain     Reports chronic lower back pain. Denies any trauma.      Patient reports to the ER with exacerbation of chronic lower back pain; pt denies any fall/injury; pt denies cp/sob/abdominal pain    The history is provided by the patient.   Back Pain   This is a chronic problem. The current episode started several days ago. The problem has been waxing and waning. The pain is associated with no known injury. The pain is present in the lumbar spine. The quality of the pain is described as aching. The pain is at a severity of 5/10. The symptoms are aggravated by bending and twisting. The pain is The same all the time. Pertinent negatives include no chest pain, no fever, no weight loss, no abdominal pain, no bowel incontinence, no perianal numbness, no bladder incontinence, no dysuria and no weakness.   Review of patient's allergies indicates:  No Known Allergies  Past Medical History:   Diagnosis Date    Arthritis     Atrial fibrillation     BPH (benign prostatic hyperplasia)     Cardiac arrest     Coronary artery disease     Cyst, kidney, acquired     Diverticulosis     Hyperlipidemia     Hypertension     MI (myocardial infarction)     Obesity     Steatosis of liver      Past Surgical History:   Procedure Laterality Date    A-V CARDIAC PACEMAKER INSERTION Right     CARDIAC CATHETERIZATION      COLONOSCOPY W/ BIOPSIES      excision of colon       Family History   Problem Relation Age of Onset    Hypertension Mother     Hypertension Father     Hypertension Sister      Social History     Tobacco Use    Smoking status: Former    Smokeless tobacco: Never   Substance Use Topics    Alcohol use: Not Currently    Drug use: Not Currently     Review of Systems   Constitutional:  Negative for fever and weight loss.   HENT:  Negative for sore throat.    Respiratory:  Negative for shortness of breath.    Cardiovascular:  Negative for chest  pain.   Gastrointestinal:  Negative for abdominal pain, bowel incontinence and nausea.   Genitourinary:  Negative for bladder incontinence and dysuria.   Musculoskeletal:  Positive for back pain.   Skin:  Negative for rash.   Neurological:  Negative for weakness.   Hematological:  Does not bruise/bleed easily.   Psychiatric/Behavioral: Negative.       Physical Exam     Initial Vitals [11/26/22 1246]   BP Pulse Resp Temp SpO2   (!) 134/97 80 18 97.2 °F (36.2 °C) 99 %      MAP       --         Physical Exam    Constitutional: He appears well-developed.   HENT:   Head: Normocephalic and atraumatic.   Eyes: Conjunctivae and EOM are normal. Pupils are equal, round, and reactive to light.   Neck:   Normal range of motion.  Cardiovascular:  Normal rate, regular rhythm and normal heart sounds.           Pulmonary/Chest: Breath sounds normal. He exhibits no tenderness.   Abdominal: Abdomen is soft. Bowel sounds are normal. He exhibits no distension. There is no abdominal tenderness.   Musculoskeletal:      Cervical back: Normal and normal range of motion.      Thoracic back: Normal.      Lumbar back: Tenderness present. Decreased range of motion.        Back:      Neurological: He is alert and oriented to person, place, and time. He displays normal reflexes. No cranial nerve deficit or sensory deficit. GCS score is 15. GCS eye subscore is 4. GCS verbal subscore is 5. GCS motor subscore is 6.   Skin: Skin is warm. No pallor.   Psychiatric: He has a normal mood and affect. His behavior is normal. Judgment and thought content normal.       ED Course   Procedures  Labs Reviewed - No data to display       Imaging Results    None          Medications   methocarbamoL injection 500 mg (500 mg Intramuscular Given 11/26/22 1407)                              Clinical Impression:   Final diagnoses:  [M54.9] Back pain, unspecified back location, unspecified back pain laterality, unspecified chronicity (Primary)      ED Disposition  Condition    Discharge Stable          ED Prescriptions       Medication Sig Dispense Start Date End Date Auth. Provider    acetaminophen-codeine 300-30mg (TYLENOL #3) 300-30 mg Tab Take 1 tablet by mouth every 6 (six) hours as needed. 12 tablet 11/26/2022 12/1/2022 KAVIN Vences          Follow-up Information       Follow up With Specialties Details Why Contact Info    discharge followup    If your symptoms become WORSE or you DO NOT IMPROVE and you are unable to reach your health care provider, you should RETURN to the emergency department    discharge info    Discussed all pertinent ED information, results, diagnosis and treatment plan; All questions and concerns were addressed at this time. Patient voices understanding of information and instructions. Patient is comfortable with plan and discharge    discharge followup    If your symptoms become WORSE or you DO NOT IMPROVE and you are unable to reach your health care provider, you should RETURN to the emergency department    discharge info    Discussed all pertinent ED information, results, diagnosis and treatment plan; All questions and concerns were addressed at this time. Patient voices understanding of information and instructions. Patient is comfortable with plan and discharge             KAVIN Vences  11/26/22 1418

## 2022-12-14 ENCOUNTER — DOCUMENTATION ONLY (OUTPATIENT)
Dept: PRIMARY CARE CLINIC | Facility: CLINIC | Age: 67
End: 2022-12-14
Payer: MEDICARE

## 2022-12-27 ENCOUNTER — HOSPITAL ENCOUNTER (EMERGENCY)
Facility: HOSPITAL | Age: 67
Discharge: HOME | End: 2022-12-27
Attending: FAMILY MEDICINE
Payer: MEDICARE

## 2022-12-27 VITALS
DIASTOLIC BLOOD PRESSURE: 88 MMHG | HEART RATE: 74 BPM | WEIGHT: 227.5 LBS | RESPIRATION RATE: 20 BRPM | OXYGEN SATURATION: 100 % | BODY MASS INDEX: 33.69 KG/M2 | TEMPERATURE: 99 F | SYSTOLIC BLOOD PRESSURE: 140 MMHG | HEIGHT: 69 IN

## 2022-12-27 DIAGNOSIS — R06.00 DYSPNEA: ICD-10-CM

## 2022-12-27 DIAGNOSIS — J20.9 ACUTE BRONCHITIS, UNSPECIFIED ORGANISM: Primary | ICD-10-CM

## 2022-12-27 LAB
ALBUMIN SERPL-MCNC: 3.6 G/DL (ref 3.4–4.8)
ALBUMIN/GLOB SERPL: 1.1 RATIO (ref 1.1–2)
ALP SERPL-CCNC: 67 UNIT/L (ref 40–150)
ALT SERPL-CCNC: 15 UNIT/L (ref 0–55)
AST SERPL-CCNC: 19 UNIT/L (ref 5–34)
BASOPHILS # BLD AUTO: 0.07 X10(3)/MCL (ref 0–0.2)
BASOPHILS NFR BLD AUTO: 1.2 %
BILIRUBIN DIRECT+TOT PNL SERPL-MCNC: 2.4 MG/DL
BNP BLD-MCNC: 240 PG/ML
BUN SERPL-MCNC: 13.3 MG/DL (ref 8.4–25.7)
CALCIUM SERPL-MCNC: 9.4 MG/DL (ref 8.8–10)
CHLORIDE SERPL-SCNC: 107 MMOL/L (ref 98–107)
CO2 SERPL-SCNC: 24 MMOL/L (ref 23–31)
CREAT SERPL-MCNC: 1.39 MG/DL (ref 0.73–1.18)
EOSINOPHIL # BLD AUTO: 0.04 X10(3)/MCL (ref 0–0.9)
EOSINOPHIL NFR BLD AUTO: 0.7 %
ERYTHROCYTE [DISTWIDTH] IN BLOOD BY AUTOMATED COUNT: 14.6 % (ref 11.6–14.4)
FLUAV AG UPPER RESP QL IA.RAPID: NOT DETECTED
FLUBV AG UPPER RESP QL IA.RAPID: NOT DETECTED
GFR SERPLBLD CREATININE-BSD FMLA CKD-EPI: 56 MLS/MIN/1.73/M2
GLOBULIN SER-MCNC: 3.3 GM/DL (ref 2.4–3.5)
GLUCOSE SERPL-MCNC: 95 MG/DL (ref 82–115)
HCT VFR BLD AUTO: 39.4 % (ref 42–52)
HGB BLD-MCNC: 13.3 GM/DL (ref 14–18)
IMM GRANULOCYTES # BLD AUTO: 0.04 X10(3)/MCL (ref 0–0.04)
IMM GRANULOCYTES NFR BLD AUTO: 0.7 %
LYMPHOCYTES # BLD AUTO: 1.8 X10(3)/MCL (ref 0.6–4.6)
LYMPHOCYTES NFR BLD AUTO: 29.7 %
MCH RBC QN AUTO: 29.4 PG
MCHC RBC AUTO-ENTMCNC: 33.8 MG/DL (ref 33–36)
MCV RBC AUTO: 87 FL (ref 80–94)
MONOCYTES # BLD AUTO: 0.94 X10(3)/MCL (ref 0.1–1.3)
MONOCYTES NFR BLD AUTO: 15.5 %
NEUTROPHILS # BLD AUTO: 3.17 X10(3)/MCL (ref 2.1–9.2)
NEUTROPHILS NFR BLD AUTO: 52.2 %
NRBC BLD AUTO-RTO: 0 % (ref 0–1)
PLATELET # BLD AUTO: 193 X10(3)/MCL (ref 140–371)
PLATELETS.RETICULATED NFR BLD AUTO: 2.9 % (ref 0.9–11.2)
PMV BLD AUTO: 10.3 FL (ref 9.4–12.4)
POTASSIUM SERPL-SCNC: 3.1 MMOL/L (ref 3.5–5.1)
PROT SERPL-MCNC: 6.9 GM/DL (ref 5.8–7.6)
RBC # BLD AUTO: 4.53 X10(6)/MCL (ref 4.7–6.1)
SARS-COV-2 RNA RESP QL NAA+PROBE: NOT DETECTED
SODIUM SERPL-SCNC: 143 MMOL/L (ref 136–145)
WBC # SPEC AUTO: 6.1 X10(3)/MCL (ref 4.5–11.5)

## 2022-12-27 PROCEDURE — 99285 EMERGENCY DEPT VISIT HI MDM: CPT | Mod: 25,CS

## 2022-12-27 PROCEDURE — 25000003 PHARM REV CODE 250: Performed by: FAMILY MEDICINE

## 2022-12-27 PROCEDURE — 94640 AIRWAY INHALATION TREATMENT: CPT

## 2022-12-27 PROCEDURE — 83880 ASSAY OF NATRIURETIC PEPTIDE: CPT | Performed by: FAMILY MEDICINE

## 2022-12-27 PROCEDURE — 93005 ELECTROCARDIOGRAM TRACING: CPT

## 2022-12-27 PROCEDURE — 80053 COMPREHEN METABOLIC PANEL: CPT | Performed by: FAMILY MEDICINE

## 2022-12-27 PROCEDURE — 85025 COMPLETE CBC W/AUTO DIFF WBC: CPT | Performed by: FAMILY MEDICINE

## 2022-12-27 PROCEDURE — 0240U COVID/FLU A&B PCR: CPT | Performed by: FAMILY MEDICINE

## 2022-12-27 PROCEDURE — 25000242 PHARM REV CODE 250 ALT 637 W/ HCPCS: Performed by: FAMILY MEDICINE

## 2022-12-27 RX ORDER — POTASSIUM CHLORIDE 20 MEQ/1
40 TABLET, EXTENDED RELEASE ORAL
Status: COMPLETED | OUTPATIENT
Start: 2022-12-27 | End: 2022-12-27

## 2022-12-27 RX ORDER — IPRATROPIUM BROMIDE AND ALBUTEROL SULFATE 2.5; .5 MG/3ML; MG/3ML
3 SOLUTION RESPIRATORY (INHALATION)
Status: COMPLETED | OUTPATIENT
Start: 2022-12-27 | End: 2022-12-27

## 2022-12-27 RX ORDER — ALBUTEROL SULFATE 90 UG/1
2 AEROSOL, METERED RESPIRATORY (INHALATION) EVERY 6 HOURS PRN
Qty: 8.5 G | Refills: 0 | Status: ON HOLD | OUTPATIENT
Start: 2022-12-27 | End: 2024-01-12

## 2022-12-27 RX ORDER — CETIRIZINE HYDROCHLORIDE 10 MG/1
10 TABLET ORAL DAILY
Qty: 14 TABLET | Refills: 0 | Status: ON HOLD | OUTPATIENT
Start: 2022-12-27 | End: 2024-01-12

## 2022-12-27 RX ORDER — BENZONATATE 100 MG/1
100 CAPSULE ORAL 3 TIMES DAILY PRN
Qty: 20 CAPSULE | Refills: 0 | Status: SHIPPED | OUTPATIENT
Start: 2022-12-27 | End: 2023-01-17

## 2022-12-27 RX ORDER — ACETAMINOPHEN AND CODEINE PHOSPHATE 300; 30 MG/1; MG/1
1 TABLET ORAL EVERY 6 HOURS PRN
Qty: 12 TABLET | Refills: 0 | Status: SHIPPED | OUTPATIENT
Start: 2022-12-27 | End: 2023-01-06

## 2022-12-27 RX ORDER — FLUTICASONE PROPIONATE 50 MCG
2 SPRAY, SUSPENSION (ML) NASAL DAILY
Qty: 15 G | Refills: 0 | Status: ON HOLD | OUTPATIENT
Start: 2022-12-27 | End: 2023-08-16 | Stop reason: HOSPADM

## 2022-12-27 RX ORDER — HYDROCODONE BITARTRATE AND ACETAMINOPHEN 7.5; 325 MG/1; MG/1
1 TABLET ORAL ONCE
Status: COMPLETED | OUTPATIENT
Start: 2022-12-27 | End: 2022-12-27

## 2022-12-27 RX ADMIN — HYDROCODONE BITARTRATE AND ACETAMINOPHEN 1 TABLET: 7.5; 325 TABLET ORAL at 05:12

## 2022-12-27 RX ADMIN — IPRATROPIUM BROMIDE AND ALBUTEROL SULFATE 3 ML: .5; 3 SOLUTION RESPIRATORY (INHALATION) at 06:12

## 2022-12-27 RX ADMIN — POTASSIUM CHLORIDE 40 MEQ: 1500 TABLET, EXTENDED RELEASE ORAL at 07:12

## 2022-12-27 NOTE — ED PROVIDER NOTES
Encounter Date: 12/27/2022       History     Chief Complaint   Patient presents with    Cough    Back Pain     States back pain probably caused by cough with Hx arthritis to back.     67-year-old gentleman presents emergency room complaints of cough x1 day.  Patient has a history of hypertension, coronary artery disease, atrial fibrillation.  Currently compliant with all his medications.  Patient has also a history of chronic back pain.  Patient reports developing a cough for 1 day and was planning to come the emergency room today for evaluation, but reports that his back began hurting, therefore decided taking ambulance to the emergency room instead.  Patient reports back pain is moderate, worse with certain movements, exactly the same as his normal back pain.  Patient reports cough is moderate, nothing makes better or worse.  Denies sinus congestion or rhinorrhea.    The history is provided by the patient.   Review of patient's allergies indicates:  No Known Allergies  Past Medical History:   Diagnosis Date    Arthritis     Atrial fibrillation     BPH (benign prostatic hyperplasia)     Cardiac arrest     Coronary artery disease     Cyst, kidney, acquired     Diverticulosis     Hyperlipidemia     Hypertension     MI (myocardial infarction)     Obesity     Steatosis of liver      Past Surgical History:   Procedure Laterality Date    A-V CARDIAC PACEMAKER INSERTION Right     CARDIAC CATHETERIZATION      COLONOSCOPY W/ BIOPSIES      excision of colon       Family History   Problem Relation Age of Onset    Hypertension Mother     Hypertension Father     Hypertension Sister      Social History     Tobacco Use    Smoking status: Former    Smokeless tobacco: Never   Substance Use Topics    Alcohol use: Not Currently    Drug use: Not Currently     Review of Systems   Constitutional:  Negative for chills, fatigue and fever.   HENT:  Negative for ear pain, rhinorrhea and sore throat.    Eyes:  Negative for photophobia and  pain.   Respiratory:  Positive for cough and shortness of breath. Negative for wheezing.    Cardiovascular:  Negative for chest pain.   Gastrointestinal:  Negative for abdominal pain, diarrhea, nausea and vomiting.   Genitourinary:  Negative for dysuria.   Neurological:  Negative for dizziness, weakness and headaches.   All other systems reviewed and are negative.    Physical Exam     Initial Vitals [12/27/22 0517]   BP Pulse Resp Temp SpO2   (!) 139/92 76 18 98.9 °F (37.2 °C) 98 %      MAP       --         Physical Exam    Nursing note and vitals reviewed.  Constitutional: He appears well-developed and well-nourished.   HENT:   Head: Normocephalic and atraumatic.   Eyes: EOM are normal. Pupils are equal, round, and reactive to light.   Neck: Neck supple.   Normal range of motion.  Cardiovascular:  Normal rate, regular rhythm, normal heart sounds and intact distal pulses.     Exam reveals no gallop and no friction rub.       No murmur heard.  Pulmonary/Chest: No respiratory distress. He has rhonchi.   Abdominal: Abdomen is soft. Bowel sounds are normal. He exhibits no distension. There is no abdominal tenderness.   Musculoskeletal:         General: Normal range of motion.      Cervical back: Normal range of motion and neck supple.     Neurological: He is alert and oriented to person, place, and time. He has normal strength.   Skin: Skin is warm and dry.   Psychiatric: He has a normal mood and affect. His behavior is normal. Judgment and thought content normal.       ED Course   Procedures  Labs Reviewed   COMPREHENSIVE METABOLIC PANEL - Abnormal; Notable for the following components:       Result Value    Potassium Level 3.1 (*)     Creatinine 1.39 (*)     Bilirubin Total 2.4 (*)     All other components within normal limits   B-TYPE NATRIURETIC PEPTIDE - Abnormal; Notable for the following components:    Natriuretic Peptide 240.0 (*)     All other components within normal limits   CBC WITH DIFFERENTIAL - Abnormal;  Notable for the following components:    RBC 4.53 (*)     Hgb 13.3 (*)     Hct 39.4 (*)     RDW 14.6 (*)     All other components within normal limits   COVID/FLU A&B PCR - Normal    Narrative:     The Xpert Xpress SARS-CoV-2/FLU/RSV plus is a rapid, multiplexed real-time PCR test intended for the simultaneous qualitative detection and differentiation of SARS-CoV-2, Influenza A, Influenza B, and respiratory syncytial virus (RSV) viral RNA in either nasopharyngeal swab or nasal swab specimens.         CBC W/ AUTO DIFFERENTIAL    Narrative:     The following orders were created for panel order CBC Auto Differential.  Procedure                               Abnormality         Status                     ---------                               -----------         ------                     CBC with Differential[[id number]]        Abnormal            Final result                 Please view results for these tests on the individual orders.        ECG Results              EKG 12-lead (Final result)  Result time 01/03/23 10:05:27      Final result by Interface, Lab In [facility] (01/03/23 10:05:27)                   Narrative:    Test Reason : R06.00,    Vent. Rate : 073 BPM     Atrial Rate : 227 BPM     P-R Int : 000 ms          QRS Dur : 092 ms      QT Int : 410 ms       P-R-T Axes : 000 099 206 degrees     QTc Int : 451 ms    Atrial fibrillation  Rightward axis  Incomplete right bundle branch block  Anterior infarct (cited on or before 26-OCT-2022)  ST and T wave abnormality, consider inferolateral ischemia  Abnormal ECG  When compared with ECG of 26-OCT-2022 06:03,  No significant change was found  Confirmed by Rico Celis MD ([id number]) on 1/3/2023 10:05:20 AM    Referred By: [email or web address]   SELF           Confirmed By:Rico Celis MD                                  Imaging Results              X-Ray Chest 1 View (Final result)  Result time 12/27/22 06:11:48      Final result by Vonnie Haney MD (12/27/22 06:11:48)                    Impression:      No acute abnormality of the chest.      Electronically signed by: Vonnie Haney  Date:    2022  Time:    06:11               Narrative:    EXAMINATION:  XR CHEST 1 VIEW    CLINICAL HISTORY:  Dyspnea;    TECHNIQUE:  AP chest    COMPARISON:  Chest x-ray dated 10/26/2022    FINDINGS:  Right chest wall AICD is in place.  The heart is stable in size.  There is no focal airspace consolidation.  There is no pleural effusion or visible pneumothorax.                                       Medications   albuterol-ipratropium 2.5 mg-0.5 mg/3 mL nebulizer solution 3 mL (3 mLs Nebulization Given by Other 22 0606)   HYDROcodone-acetaminophen 7.5-325 mg per tablet 1 tablet (1 tablet Oral Given 22 0549)   potassium chloride SA CR tablet 40 mEq (40 mEq Oral Given 22 0708)                 ED Course as of 23e Dec 27, 2022   0512 Patient feeling improved.  Stable for discharge to home.  ER precautions for any acute worsening.  Noted to be slightly hypokalemic, given 40 mEq of potassium here in the emergency room. [MW]      ED Course User Index  [MW] Eduardo Hernandez MD                 Clinical Impression:   Final diagnoses:  [R06.00] Dyspnea  [J20.9] Acute bronchitis, unspecified organism (Primary)        ED Disposition Condition    Discharge Stable          ED Prescriptions       Medication Sig Dispense Start Date End Date Auth. Provider    benzonatate (TESSALON) 100 MG capsule Take 1 capsule (100 mg total) by mouth 3 (three) times daily as needed for Cough. 20 capsule 2022 Eduardo Hernandez MD    fluticasone propionate (FLONASE) 50 mcg/actuation nasal spray 2 sprays (100 mcg total) by Each Nostril route once daily. 15 g 2022 -- Eduardo Hernandez MD    cetirizine (ZYRTEC) 10 MG tablet () Take 1 tablet (10 mg total) by mouth once daily. for 14 days 14 tablet 2022 1/10/2023 Eduardo Hernandez MD    albuterol  (PROVENTIL/VENTOLIN HFA) 90 mcg/actuation inhaler Inhale 2 puffs into the lungs every 6 (six) hours as needed for Wheezing. Rescue 8.5 g 2022 Eduardo Hernandez MD    acetaminophen-codeine 300-30mg (TYLENOL #3) 300-30 mg Tab () Take 1 tablet by mouth every 6 (six) hours as needed (pain). 12 tablet 2022 Eduardo Hernandez MD          Follow-up Information       Follow up With Specialties Details Why Contact Info    [facility] Dept Emergency Medicine  As needed, If symptoms worsen [address] [facility] [id number] [phone]    Primary Care Physician  In 5 days               Eduardo Hernandez MD  22 0701       Eduardo Hernandez MD  23 [id number]

## 2023-01-18 ENCOUNTER — HOSPITAL ENCOUNTER (EMERGENCY)
Facility: HOSPITAL | Age: 68
Discharge: HOME | End: 2023-01-18
Attending: FAMILY MEDICINE
Payer: MEDICARE

## 2023-01-18 VITALS
DIASTOLIC BLOOD PRESSURE: 104 MMHG | BODY MASS INDEX: 35.92 KG/M2 | WEIGHT: 242.5 LBS | HEART RATE: 87 BPM | OXYGEN SATURATION: 99 % | TEMPERATURE: 99 F | RESPIRATION RATE: 18 BRPM | SYSTOLIC BLOOD PRESSURE: 156 MMHG | HEIGHT: 69 IN

## 2023-01-18 DIAGNOSIS — G89.29 ACUTE EXACERBATION OF CHRONIC LOW BACK PAIN: Primary | ICD-10-CM

## 2023-01-18 DIAGNOSIS — M54.50 ACUTE EXACERBATION OF CHRONIC LOW BACK PAIN: Primary | ICD-10-CM

## 2023-01-18 PROCEDURE — 25000003 PHARM REV CODE 250: Performed by: PHYSICIAN ASSISTANT

## 2023-01-18 PROCEDURE — 99283 EMERGENCY DEPT VISIT LOW MDM: CPT

## 2023-01-18 RX ORDER — METHOCARBAMOL 750 MG/1
750 TABLET, FILM COATED ORAL
Status: COMPLETED | OUTPATIENT
Start: 2023-01-18 | End: 2023-01-18

## 2023-01-18 RX ORDER — KETOROLAC TROMETHAMINE 10 MG/1
10 TABLET, FILM COATED ORAL
Status: COMPLETED | OUTPATIENT
Start: 2023-01-18 | End: 2023-01-18

## 2023-01-18 RX ADMIN — KETOROLAC TROMETHAMINE 10 MG: 10 TABLET, FILM COATED ORAL at 06:01

## 2023-01-18 RX ADMIN — METHOCARBAMOL 750 MG: 750 TABLET ORAL at 06:01

## 2023-01-19 NOTE — ED PROVIDER NOTES
Encounter Date: 1/18/2023       History     Chief Complaint   Patient presents with    Back Pain     Pt reports to the c/o lower back pain since last night. Also states non-productive cough. Vss. radha Daniel Jr. Is a 67 y.o. male with a history of HLD, HTN, CAD, arthritis, chronic back pain who presents to the ED complaining of an exacerbation of his low back pain. Patient reports back pain is moderate, worse with certain movements, exactly the same as his normal back pain. No recent fall, trauma, or injury. Patient reports he did not try taking any medications today to help with his pain. He denies fevers, chills, bowel/bladder incontinence, saddle anesthesia.     The history is provided by the patient. No  was used.   Review of patient's allergies indicates:  No Known Allergies  Past Medical History:   Diagnosis Date    Arthritis     Atrial fibrillation     BPH (benign prostatic hyperplasia)     Cardiac arrest     Coronary artery disease     Cyst, kidney, acquired     Diverticulosis     Hyperlipidemia     Hypertension     MI (myocardial infarction)     Obesity     Steatosis of liver      Past Surgical History:   Procedure Laterality Date    A-V CARDIAC PACEMAKER INSERTION Right     CARDIAC CATHETERIZATION      COLONOSCOPY W/ BIOPSIES      excision of colon       Family History   Problem Relation Age of Onset    Hypertension Mother     Hypertension Father     Hypertension Sister      Social History     Tobacco Use    Smoking status: Former    Smokeless tobacco: Never   Substance Use Topics    Alcohol use: Not Currently    Drug use: Not Currently     Review of Systems   Constitutional:  Negative for activity change, chills and fever.   HENT:  Negative for congestion and trouble swallowing.    Eyes:  Negative for photophobia and visual disturbance.   Respiratory:  Negative for chest tightness, shortness of breath and wheezing.    Cardiovascular:  Negative for chest pain, palpitations  and leg swelling.   Gastrointestinal:  Negative for abdominal pain, constipation, diarrhea, nausea and vomiting.   Genitourinary:  Negative for dysuria, frequency, hematuria and urgency.   Musculoskeletal:  Positive for back pain. Negative for arthralgias and gait problem.   Skin:  Negative for color change and rash.   Neurological:  Negative for dizziness, syncope, weakness, light-headedness, numbness and headaches.   Psychiatric/Behavioral:  Negative for agitation and confusion. The patient is not nervous/anxious.      Physical Exam     Initial Vitals [01/18/23 1748]   BP Pulse Resp Temp SpO2   (!) 156/104 87 18 98.6 °F (37 °C) 99 %      MAP       --         Physical Exam    Nursing note and vitals reviewed.  Constitutional: He appears well-developed and well-nourished. No distress.   HENT:   Head: Normocephalic and atraumatic.   Mouth/Throat: No oropharyngeal exudate.   Eyes: EOM are normal. No scleral icterus.   Neck: Neck supple.   Normal range of motion.  Cardiovascular:  Normal rate and regular rhythm.           No murmur heard.  Pulmonary/Chest: No respiratory distress. He has no wheezes.   Abdominal: Abdomen is soft. He exhibits no distension. There is no abdominal tenderness.   Musculoskeletal:         General: No edema. Normal range of motion.      Cervical back: Normal range of motion and neck supple.     Neurological: He is alert and oriented to person, place, and time. No cranial nerve deficit.   Skin: Skin is warm and dry. Capillary refill takes less than 2 seconds. No erythema.   Psychiatric: He has a normal mood and affect. Thought content normal.       ED Course   Procedures  Labs Reviewed - No data to display       Imaging Results    None          Medications   methocarbamoL tablet 750 mg (750 mg Oral Given 1/18/23 1859)   ketorolac tablet 10 mg (10 mg Oral Given 1/18/23 1848)                 ED Course as of 01/18/23 1907 Wed Jan 18, 2023 1906 Discussed all pertinent ED information, results,  diagnosis and treatment plan; All questions and concerns were addressed at this time. Patient voices understanding of information and instructions. Patient is comfortable with plan and discharge [KD]      ED Course User Index  [KD] Ning Stoner PA-C                 Clinical Impression:   Final diagnoses:  [M54.50, G89.29] Acute exacerbation of chronic low back pain (Primary)        ED Disposition Condition    Discharge Stable          ED Prescriptions    None       Follow-up Information       Follow up With Specialties Details Why Contact Info    [facility] Emergency Medicine  If symptoms worsen [address] [facility] [id number] [phone]    PCP  Schedule an appointment as soon as possible for a visit in 3 days Hospital follow up              Ning Stoner PA-C  01/18/23 [id number]

## 2023-01-19 NOTE — DISCHARGE INSTRUCTIONS
If your symptoms become WORSE or you DO NOT IMPROVE and you are unable to reach your health care provider, you should RETURN to the emergency department.

## 2023-01-30 ENCOUNTER — HOSPITAL ENCOUNTER (EMERGENCY)
Facility: HOSPITAL | Age: 68
Discharge: HOME | End: 2023-01-30
Attending: EMERGENCY MEDICINE
Payer: MEDICARE

## 2023-01-30 VITALS
HEART RATE: 73 BPM | HEIGHT: 69 IN | BODY MASS INDEX: 33.47 KG/M2 | RESPIRATION RATE: 17 BRPM | WEIGHT: 226 LBS | OXYGEN SATURATION: 99 % | TEMPERATURE: 99 F | DIASTOLIC BLOOD PRESSURE: 61 MMHG | SYSTOLIC BLOOD PRESSURE: 97 MMHG

## 2023-01-30 DIAGNOSIS — R10.13 EPIGASTRIC PAIN: Primary | ICD-10-CM

## 2023-01-30 LAB
ALBUMIN SERPL-MCNC: 3.7 G/DL (ref 3.4–4.8)
ALBUMIN/GLOB SERPL: 1.2 RATIO (ref 1.1–2)
ALP SERPL-CCNC: 58 UNIT/L (ref 40–150)
ALT SERPL-CCNC: 15 UNIT/L (ref 0–55)
AST SERPL-CCNC: 16 UNIT/L (ref 5–34)
BASOPHILS # BLD AUTO: 0.06 X10(3)/MCL (ref 0–0.2)
BASOPHILS NFR BLD AUTO: 0.8 %
BILIRUBIN DIRECT+TOT PNL SERPL-MCNC: 1.8 MG/DL
BUN SERPL-MCNC: 21.2 MG/DL (ref 8.4–25.7)
CALCIUM SERPL-MCNC: 9 MG/DL (ref 8.8–10)
CHLORIDE SERPL-SCNC: 109 MMOL/L (ref 98–107)
CO2 SERPL-SCNC: 22 MMOL/L (ref 23–31)
CREAT SERPL-MCNC: 2.1 MG/DL (ref 0.73–1.18)
EOSINOPHIL # BLD AUTO: 0.09 X10(3)/MCL (ref 0–0.9)
EOSINOPHIL NFR BLD AUTO: 1.2 %
ERYTHROCYTE [DISTWIDTH] IN BLOOD BY AUTOMATED COUNT: 13.7 % (ref 11.5–17)
GFR SERPLBLD CREATININE-BSD FMLA CKD-EPI: 34 MLS/MIN/1.73/M2
GLOBULIN SER-MCNC: 3.2 GM/DL (ref 2.4–3.5)
GLUCOSE SERPL-MCNC: 118 MG/DL (ref 82–115)
HCT VFR BLD AUTO: 39.7 % (ref 42–52)
HGB BLD-MCNC: 13.3 GM/DL (ref 14–18)
IMM GRANULOCYTES # BLD AUTO: 0.04 X10(3)/MCL (ref 0–0.04)
IMM GRANULOCYTES NFR BLD AUTO: 0.5 %
LIPASE SERPL-CCNC: 47 U/L
LYMPHOCYTES # BLD AUTO: 2.41 X10(3)/MCL (ref 0.6–4.6)
LYMPHOCYTES NFR BLD AUTO: 32.4 %
MAGNESIUM SERPL-MCNC: 1.6 MG/DL (ref 1.6–2.6)
MCH RBC QN AUTO: 29.7 PG
MCHC RBC AUTO-ENTMCNC: 33.5 MG/DL (ref 33–36)
MCV RBC AUTO: 88.6 FL (ref 80–94)
MONOCYTES # BLD AUTO: 0.73 X10(3)/MCL (ref 0.1–1.3)
MONOCYTES NFR BLD AUTO: 9.8 %
NEUTROPHILS # BLD AUTO: 4.11 X10(3)/MCL (ref 2.1–9.2)
NEUTROPHILS NFR BLD AUTO: 55.3 %
NRBC BLD AUTO-RTO: 0 %
PLATELET # BLD AUTO: 255 X10(3)/MCL (ref 130–400)
PMV BLD AUTO: 10 FL (ref 7.4–10.4)
POTASSIUM SERPL-SCNC: 3.3 MMOL/L (ref 3.5–5.1)
PROT SERPL-MCNC: 6.9 GM/DL (ref 5.8–7.6)
RBC # BLD AUTO: 4.48 X10(6)/MCL (ref 4.7–6.1)
SODIUM SERPL-SCNC: 140 MMOL/L (ref 136–145)
TROPONIN I SERPL-MCNC: <0.01 NG/ML (ref 0–0.04)
WBC # SPEC AUTO: 7.4 X10(3)/MCL (ref 4.5–11.5)

## 2023-01-30 PROCEDURE — 83735 ASSAY OF MAGNESIUM: CPT | Performed by: EMERGENCY MEDICINE

## 2023-01-30 PROCEDURE — 83690 ASSAY OF LIPASE: CPT | Performed by: EMERGENCY MEDICINE

## 2023-01-30 PROCEDURE — 96375 TX/PRO/DX INJ NEW DRUG ADDON: CPT

## 2023-01-30 PROCEDURE — 85025 COMPLETE CBC W/AUTO DIFF WBC: CPT | Performed by: EMERGENCY MEDICINE

## 2023-01-30 PROCEDURE — 99285 EMERGENCY DEPT VISIT HI MDM: CPT | Mod: 25

## 2023-01-30 PROCEDURE — 96374 THER/PROPH/DIAG INJ IV PUSH: CPT | Mod: 59

## 2023-01-30 PROCEDURE — 96361 HYDRATE IV INFUSION ADD-ON: CPT

## 2023-01-30 PROCEDURE — 80053 COMPREHEN METABOLIC PANEL: CPT | Performed by: EMERGENCY MEDICINE

## 2023-01-30 PROCEDURE — 25000003 PHARM REV CODE 250: Performed by: EMERGENCY MEDICINE

## 2023-01-30 PROCEDURE — 84484 ASSAY OF TROPONIN QUANT: CPT | Performed by: EMERGENCY MEDICINE

## 2023-01-30 PROCEDURE — 93005 ELECTROCARDIOGRAM TRACING: CPT

## 2023-01-30 PROCEDURE — 25500020 PHARM REV CODE 255

## 2023-01-30 PROCEDURE — 63600175 PHARM REV CODE 636 W HCPCS: Performed by: EMERGENCY MEDICINE

## 2023-01-30 RX ORDER — ONDANSETRON 2 MG/ML
4 INJECTION INTRAMUSCULAR; INTRAVENOUS
Status: COMPLETED | OUTPATIENT
Start: 2023-01-30 | End: 2023-01-30

## 2023-01-30 RX ORDER — MORPHINE SULFATE 2 MG/ML
4 INJECTION, SOLUTION INTRAMUSCULAR; INTRAVENOUS
Status: COMPLETED | OUTPATIENT
Start: 2023-01-30 | End: 2023-01-30

## 2023-01-30 RX ORDER — DIPHENHYDRAMINE HYDROCHLORIDE 50 MG/ML
12.5 INJECTION INTRAMUSCULAR; INTRAVENOUS
Status: COMPLETED | OUTPATIENT
Start: 2023-01-30 | End: 2023-01-30

## 2023-01-30 RX ADMIN — IOHEXOL 100 ML: 350 INJECTION, SOLUTION INTRAVENOUS at 03:01

## 2023-01-30 RX ADMIN — SODIUM CHLORIDE 1000 ML: 9 INJECTION, SOLUTION INTRAVENOUS at 02:01

## 2023-01-30 RX ADMIN — ONDANSETRON 4 MG: 2 INJECTION INTRAMUSCULAR; INTRAVENOUS at 02:01

## 2023-01-30 RX ADMIN — MORPHINE SULFATE 4 MG: 2 INJECTION, SOLUTION INTRAMUSCULAR; INTRAVENOUS at 02:01

## 2023-01-30 RX ADMIN — DIPHENHYDRAMINE HYDROCHLORIDE 12.5 MG: 50 INJECTION INTRAMUSCULAR; INTRAVENOUS at 03:01

## 2023-01-30 NOTE — ED PROVIDER NOTES
"Encounter Date: 1/30/2023       History     Chief Complaint   Patient presents with    Vomiting     Pt to er with c/o sudden onset midline upper abdominal pain and vomiting. Pt reports started about an hour ago and has had 3 episodes of vomiting.      Mr. Delio Navarrete Jr. Is a 68 yo male who presents with chief complaint abdominal pain. Onset was around 0100 when he began having epigastric abdominal pain that he characterizes as "feels like someone punching me" that has been constant, moderate to severe in intensity, nonradiating, nothing seems to make it better or worse. Associated symptoms of nausea and vomiting. Denies fever, hematemesis, chest pain, shortness of breath, diarrhea, hematochezia or melena.    The history is provided by the patient.   Review of patient's allergies indicates:  No Known Allergies  Past Medical History:   Diagnosis Date    Arthritis     Atrial fibrillation     BPH (benign prostatic hyperplasia)     Cardiac arrest     Coronary artery disease     Cyst, kidney, acquired     Diverticulosis     Hyperlipidemia     Hypertension     MI (myocardial infarction)     Obesity     Steatosis of liver      Past Surgical History:   Procedure Laterality Date    A-V CARDIAC PACEMAKER INSERTION Right     CARDIAC CATHETERIZATION      COLONOSCOPY W/ BIOPSIES      excision of colon       Family History   Problem Relation Age of Onset    Hypertension Mother     Hypertension Father     Hypertension Sister      Social History     Tobacco Use    Smoking status: Former    Smokeless tobacco: Never   Substance Use Topics    Alcohol use: Not Currently    Drug use: Not Currently     Review of Systems    Physical Exam     Initial Vitals [01/30/23 0230]   BP Pulse Resp Temp SpO2   (!) 146/89 66 20 98.5 °F (36.9 °C) 98 %      MAP       --         Physical Exam    Nursing note and vitals reviewed.  Constitutional: He appears well-developed and well-nourished.   HENT:   Head: Normocephalic and atraumatic.   Eyes: EOM " are normal. Pupils are equal, round, and reactive to light.   Neck: Neck supple.   Normal range of motion.  Cardiovascular:  Normal rate and intact distal pulses. An irregular rhythm present.           Pulmonary/Chest: Breath sounds normal.   Abdominal: Abdomen is soft. Bowel sounds are normal. There is abdominal tenderness in the epigastric area. There is no rebound and no guarding.   Musculoskeletal:         General: Normal range of motion.      Cervical back: Normal range of motion and neck supple.     Neurological: He is alert and oriented to person, place, and time.   Skin: Skin is warm and dry. Capillary refill takes less than 2 seconds.   Psychiatric: He has a normal mood and affect.       ED Course   Procedures  Labs Reviewed   COMPREHENSIVE METABOLIC PANEL - Abnormal; Notable for the following components:       Result Value    Potassium Level 3.3 (*)     Chloride 109 (*)     Carbon Dioxide 22 (*)     Glucose Level 118 (*)     Creatinine 2.10 (*)     Bilirubin Total 1.8 (*)     All other components within normal limits   CBC WITH DIFFERENTIAL - Abnormal; Notable for the following components:    RBC 4.48 (*)     Hgb 13.3 (*)     Hct 39.7 (*)     All other components within normal limits   LIPASE - Normal   MAGNESIUM - Normal   TROPONIN I - Normal   CBC W/ AUTO DIFFERENTIAL    Narrative:     The following orders were created for panel order CBC auto differential.  Procedure                               Abnormality         Status                     ---------                               -----------         ------                     CBC with Differential[[id number]]        Abnormal            Final result                 Please view results for these tests on the individual orders.   EXTRA TUBES    Narrative:     The following orders were created for panel order EXTRA TUBES.  Procedure                               Abnormality         Status                     ---------                                -----------         ------                     Light Blue Top Hold[[id number]]                              In process                 Gold Top Hold[[id number]]                                    In process                   Please view results for these tests on the individual orders.   LIGHT BLUE TOP HOLD   GOLD TOP HOLD   EXTRA TUBES    Narrative:     The following orders were created for panel order EXTRA TUBES.  Procedure                               Abnormality         Status                     ---------                               -----------         ------                     Light Blue Top Hold[[id number]]                              In process                 Lavender Top Hold[[id number]]                                In process                 Lavender Top Hold[[id number]]                                In process                   Please view results for these tests on the individual orders.   LIGHT BLUE TOP HOLD   LAVENDER TOP HOLD   LAVENDER TOP HOLD     EKG Readings: (Independently Interpreted)   Initial Reading: No STEMI. Rhythm: Paced Rhythm. Heart Rate: 66. Axis: Left Axis Deviation.   ECG Results              EKG 12-lead (Final result)  Result time 01/31/23 14:35:04      Final result by Interface, Lab In [facility] (01/31/23 14:35:04)                   Narrative:    Test Reason : R10.13,    Vent. Rate : 066 BPM     Atrial Rate : 061 BPM     P-R Int : 000 ms          QRS Dur : 188 ms      QT Int : 516 ms       P-R-T Axes : 000 -77 091 degrees     QTc Int : 540 ms    Ventricular-paced rhythm  Abnormal ECG  When compared with ECG of 27-DEC-2022 05:54,  Electronic ventricular pacemaker has replaced Atrial fibrillation  Confirmed by Rico Celis MD ([id number]) on 1/31/2023 2:34:58 PM    Referred By: [email or web address]   SELF           Confirmed By:Rico Celis MD                                  Imaging Results              CT Abdomen Pelvis With Contrast (Final result)  Result time 01/30/23 08:39:30      Final  result by Tani Calderon MD (01/30/23 08:39:30)                   Impression:      Some bladder wall thickening is similar to prior but correlate clinically for cystitis.  Otherwise no acute abdominopelvic findings.    No significant discrepancy with the preliminary report.      Electronically signed by: Tani Calderon  Date:    01/30/2023  Time:    08:39               Narrative:    EXAMINATION:  CT ABDOMEN PELVIS WITH CONTRAST    CLINICAL HISTORY:  Epigastric pain;    TECHNIQUE:  Helical acquisition through the abdomen and pelvis with IV contrast.  Three plane reconstructions were provided for review.  mGycm. Automatic exposure control, adjustment of mA/kV or iterative reconstruction technique was used to reduce radiation.    COMPARISON:  7 September 2022    FINDINGS:  There are mild chronic changes at the lung bases.    Few tiny a patent hypodensities are stable.  No significant abnormality of the gallbladder, spleen, pancreas or adrenals.  No hydronephrosis or suspicious renal lesion.    No bowel obstruction. No significant inflammatory changes of the bowel.  Normal appendix.  No free air.    There is bladder wall thickening which is similar to prior.  Prostate mildly enlarged.  No pelvic free fluid.  Abdominal aorta normal in caliber.  Mild atherosclerotic disease.    Moderate degenerative change of the spine.                        Preliminary result by Tani Calderon MD (01/30/23 04:56:48)                   Narrative:    START OF REPORT:  Technique: CT of the abdomen and pelvis was performed with axial images as well as sagittal and coronal reconstruction images with intravenous contrast.    Comparison: None available.    Clinical History: Epigastric pain.    Dosage Information: Automated Exposure Control was utilized 674.43 mGy.cm.    Findings:  Thorax:  Lungs: There is mild nonspecific dependent change at the lung bases.  Pleura: No effusions or thickening are seen.  Heart: Mild cardiomegaly is seen.  Pacer leads are seen in the visualized heart.  Liver: The liver appears unremarkable.  Biliary System: No intrahepatic or extrahepatic biliary duct dilatation is seen.  Gallbladder: The gallbladder appears unremarkable.  Pancreas: The pancreas appears unremarkable.  Spleen: The spleen appears unremarkable.  Adrenals: The adrenal glands appear unremarkable.  Kidneys: A single cyst measuring 1.5 cm is seen on series 2; image 37 in the mid pole of the left kidney. The left kidney otherwise appears unremarkable with no stones or hydronephrosis identified. A single cyst with internal septation measuring 2.6 x 3.4 cm is seen on series 2; image 45 in the lower pole of the right kidney. The right kidney otherwise appears unremarkable with no stones or hydronephrosis identified.  Aorta: There is mild calcification of the abdominal aorta and its branches.  IVC: Unremarkable.  Bowel:  Esophagus: The visualized esophagus appears unremarkable.  Stomach: The stomach appears unremarkable.  Duodenum: Unremarkable appearing duodenum.  Small Bowel: The small bowel appears unremarkable.  Colon: Nondistended.  Appendix: The appendix appears unremarkable (series 2; images 65-74).  Peritoneum: No intraperitoneal free air or ascites is seen.    Pelvis:  Bladder: The bladder is nondistended however the bladder wall appears thickened after considering state of nondistension.  Male:  Prostate gland: The prostate gland is mildly enlarged. There are multiple calcifications in the prostate gland.  Inguinal Findings:  Inguinal Hernia: Incidental note is made of small uncomplicated mesenteric fat containing left inguinal hernia.    Bony structures:  Dorsal Spine: There is mild spondylosis of the visualized dorsal spine.  Bony Pelvis: The visualized bony structures of the pelvis appear unremarkable.      Impression:  1. The bladder is nondistended however the bladder wall appears thickened after considering state of nondistension which could  reflect cystitis. Correlate with clinical and laboratory findings.  2. Details and other findings as discussed above.                          Preliminary result by Jose Cullen MD (01/30/23 04:56:48)                   Narrative:    START OF REPORT:  Technique: CT of the abdomen and pelvis was performed with axial images as well as sagittal and coronal reconstruction images with intravenous contrast.    Comparison: None available.    Clinical History: Epigastric pain.    Dosage Information: Automated Exposure Control was utilized 674.43 mGy.cm.    Findings:  Thorax:  Lungs: There is mild nonspecific dependent change at the lung bases.  Pleura: No effusions or thickening are seen.  Heart: Mild cardiomegaly is seen. Pacer leads are seen in the visualized heart.  Liver: The liver appears unremarkable.  Biliary System: No intrahepatic or extrahepatic biliary duct dilatation is seen.  Gallbladder: The gallbladder appears unremarkable.  Pancreas: The pancreas appears unremarkable.  Spleen: The spleen appears unremarkable.  Adrenals: The adrenal glands appear unremarkable.  Kidneys: A single cyst measuring 1.5 cm is seen on series 2; image 37 in the mid pole of the left kidney. The left kidney otherwise appears unremarkable with no stones or hydronephrosis identified. A single cyst with internal septation measuring 2.6 x 3.4 cm is seen on series 2; image 45 in the lower pole of the right kidney. The right kidney otherwise appears unremarkable with no stones or hydronephrosis identified.  Aorta: There is mild calcification of the abdominal aorta and its branches.  IVC: Unremarkable.  Bowel:  Esophagus: The visualized esophagus appears unremarkable.  Stomach: The stomach appears unremarkable.  Duodenum: Unremarkable appearing duodenum.  Small Bowel: The small bowel appears unremarkable.  Colon: Nondistended.  Appendix: The appendix appears unremarkable (series 2; images 65-74).  Peritoneum: No intraperitoneal free air or  ascites is seen.    Pelvis:  Bladder: The bladder is nondistended however the bladder wall appears thickened after considering state of nondistension.  Male:  Prostate gland: The prostate gland is mildly enlarged. There are multiple calcifications in the prostate gland.  Inguinal Findings:  Inguinal Hernia: Incidental note is made of small uncomplicated mesenteric fat containing left inguinal hernia.    Bony structures:  Dorsal Spine: There is mild spondylosis of the visualized dorsal spine.  Bony Pelvis: The visualized bony structures of the pelvis appear unremarkable.      Impression:  1. The bladder is nondistended however the bladder wall appears thickened after considering state of nondistension which could reflect cystitis. Correlate with clinical and laboratory findings.  2. Details and other findings as discussed above.                                         Medications   sodium chloride 0.9% bolus 1,000 mL 1,000 mL (0 mLs Intravenous Stopped 1/30/23 0357)   morphine injection 4 mg (4 mg Intravenous Given 1/30/23 0257)   ondansetron injection 4 mg (4 mg Intravenous Given 1/30/23 0257)   diphenhydrAMINE injection 12.5 mg (12.5 mg Intravenous Given 1/30/23 0319)   iohexoL (OMNIPAQUE 350) 350 mg iodine/mL injection (100 mLs Intravenous Given 1/30/23 0345)     Medical Decision Making:   Initial Assessment:   This patient presents with abdominal pain of unclear etiology. A CT scan was performed to evaluate for potential causes of the abdominal pain, however, neither the clinical exam nor the CT has identified an emergent etiology for the abdominal pain. Specifically, given the benign exam, the laboratory studies, and unremarkable CT, I have a very low suspicion for appendicitis, ischemic bowel, bowel perforation, or any other life threatening disease. I have discussed with the patient the level of uncertainty with undifferentiated abdominal pain and clearly explained the need to follow-up as noted on the  discharge instructions, or return to the Emergency Department immediately if the pain worsens, develops fever, persistent and uncontrollable vomiting, or for any new symptoms or concerns.  Patient denies having any urinary symptoms to suggest presence of cystitis given the bladder wall thickness seen on CT imaging.  ECG and troponin were obtained to exclude ACS as etiology for his epigastric abdominal pain and vomiting, these were unremarkable as well.  Instructed to follow up outpatient with his PCP with strict return precautions.  I have spoken with the patient and/or caregivers. I have explained the patient's condition, diagnoses and treatment plan based on the information available to me at this time. I have answered the patient's and/or caregiver's questions and addressed any concerns. The patient and/or caregivers have as good an understanding of the patient's diagnosis, condition and treatment plan as can be expected at this point. The vital signs have been stable. The patient's condition is stable and appropriate for discharge from the emergency department.     The patient will pursue further outpatient evaluation with the primary care physician or other designated or consulting physician as outlined in the discharge instructions. The patient and/or caregivers are agreeable to this plan of care and follow-up instructions have been explained in detail. The patient and/or caregivers have received these instructions in written format and have expressed an understanding of the discharge instructions. The patient and/or caregivers are aware that any significant change in condition or worsening of symptoms should prompt an immediate return to this or the closest emergency department or a call to 911.  Clinical Tests:   Lab Tests: Ordered and Reviewed  Radiological Study: Reviewed and Ordered  Medical Tests: Ordered and Reviewed           ED Course as of 01/31/23 2204   Mon Jan 30, 2023   0426 Abdominal pain and  nausea have resolved after morphine and zofran. Repeat abdominal exam is benign, no tenderness or guarding. Given his cardiac history will check troponin and ECG as well since his CT scan did not reveal any acute intra-abdominal pathology. [IB]      ED Course User Index  [IB] Kelvin Salcido DO                 Clinical Impression:   Final diagnoses:  [R10.13] Epigastric pain (Primary)        ED Disposition Condition    Discharge Stable          ED Prescriptions    None       Follow-up Information    None          Kelvin Salcido DO  01/31/23 8040

## 2023-02-05 NOTE — PROGRESS NOTES
Chief Complaint  Follow-up (Pt here today for 4 mth f/u . No complaints noted at this time. )     LUIS Daniel Jr. is a 67 y.o. male who has a past medical history of Arthritis, Atrial fibrillation, BPH (benign prostatic hyperplasia), Cardiac arrest, Coronary artery disease, Cyst, kidney, acquired, Diverticulosis, Hyperlipidemia, Hypertension, MI (myocardial infarction), Obesity, and Steatosis of liver.  He presents to clinic today for follow-up of chronic medical conditions.     ROS  Review of Systems   Constitutional:  Negative for chills and fever.   Respiratory:  Negative for shortness of breath.    Cardiovascular:  Negative for chest pain and leg swelling.   Gastrointestinal:  Negative for nausea and vomiting.   Genitourinary:  Negative for dysuria.   Musculoskeletal:  Negative for myalgias.       PE  Vitals:    02/06/23 0835   BP: (!) 147/96   Pulse: 71   Resp: 20   Temp: 96.4 °F (35.8 °C)        Physical Exam  Vitals and nursing note reviewed.   Constitutional:       General: He is awake. He is not in acute distress.     Appearance: Normal appearance. He is well-developed and well-groomed. He is obese. He is not ill-appearing, toxic-appearing or diaphoretic.   HENT:      Head: Normocephalic and atraumatic.      Right Ear: External ear normal.      Left Ear: External ear normal.      Mouth/Throat:      Mouth: Mucous membranes are moist.      Pharynx: Oropharynx is clear.   Eyes:      General: Lids are normal. Vision grossly intact.      Extraocular Movements: Extraocular movements intact.      Conjunctiva/sclera: Conjunctivae normal.      Pupils: Pupils are equal, round, and reactive to light.   Neck:      Trachea: Trachea normal.   Cardiovascular:      Rate and Rhythm: Normal rate. Rhythm irregular.      Chest Wall: PMI is not displaced.      Pulses: Normal pulses.      Heart sounds: Normal heart sounds, S1 normal and S2 normal. No murmur heard.    No gallop.   Pulmonary:      Effort: Pulmonary effort  is normal. No respiratory distress.      Breath sounds: Normal breath sounds. No decreased breath sounds, wheezing, rhonchi or rales.   Chest:      Chest wall: No tenderness.   Abdominal:      General: Bowel sounds are normal. There is no distension.      Palpations: Abdomen is soft. There is no mass.      Tenderness: There is no abdominal tenderness.   Musculoskeletal:         General: No swelling. Normal range of motion.      Cervical back: Normal range of motion.      Right lower leg: No edema.      Left lower leg: No edema.   Skin:     General: Skin is warm and dry.      Coloration: Skin is not cyanotic, jaundiced or pale.   Neurological:      General: No focal deficit present.      Mental Status: He is alert and oriented to person, place, and time.      Sensory: Sensation is intact.      Motor: Motor function is intact.      Coordination: Coordination is intact.      Gait: Gait is intact.   Psychiatric:         Behavior: Behavior is cooperative.        Assessment/Plan  CAD with history of NSTEMI in 2003  Second-degree AV block s/p pacemaker (2017)   -Continue atorvastatin 40 mg daily and aspirin 81 mg daily     A fib   -Currently asymptomatic, rate controlled   -Continue metoprolol succinate 200 mg daily.   -Continue diltiazem 180mg daily and Xarelto 20 mg daily     Hypertension   -Blood pressure today 147/96 REPEAT 125/78     Abdominal pain in the setting of history of Small bowel carcinoid tumor s/p resection (2018)   -Currently followed by [facility] oncology and [facility]    DOES NOT WANT VACCINES  RTC in 4 months.      Future Appointments   Date Time Provider Department Center   2/7/2023  8:30 AM PACEMAKER, [facility] CARDIOLOGY [facility]um    2/9/2023 10:00 AM Cyndie Villegas MD [facility] CARD [place]    7/3/2023  9:00 AM [facility] [place]    7/3/2023  9:30 AM [facility] CT[facility]    7/20/2023 12:00 PM NURSE, [facility] HEMATOLOGY ONCOLOGY [facility]  [place]   7/20/2023  1:00 PM [facility] Reed [facility] [place]   Internal Medicine - PGY-2

## 2023-02-06 ENCOUNTER — OFFICE VISIT (OUTPATIENT)
Dept: INTERNAL MEDICINE | Facility: CLINIC | Age: 68
End: 2023-02-06
Payer: MEDICARE

## 2023-02-06 VITALS
DIASTOLIC BLOOD PRESSURE: 84 MMHG | HEART RATE: 71 BPM | HEIGHT: 69 IN | BODY MASS INDEX: 33.97 KG/M2 | WEIGHT: 229.38 LBS | RESPIRATION RATE: 20 BRPM | OXYGEN SATURATION: 98 % | TEMPERATURE: 96 F | SYSTOLIC BLOOD PRESSURE: 128 MMHG

## 2023-02-06 DIAGNOSIS — I25.10 CORONARY ARTERY DISEASE INVOLVING NATIVE HEART WITHOUT ANGINA PECTORIS, UNSPECIFIED VESSEL OR LESION TYPE: ICD-10-CM

## 2023-02-06 DIAGNOSIS — I48.20 ATRIAL FIBRILLATION, CHRONIC: ICD-10-CM

## 2023-02-06 DIAGNOSIS — C7A.8 NEUROENDOCRINE CARCINOMA OF SMALL BOWEL: ICD-10-CM

## 2023-02-06 DIAGNOSIS — I10 HYPERTENSION, UNSPECIFIED TYPE: Primary | ICD-10-CM

## 2023-02-06 PROCEDURE — 99214 OFFICE O/P EST MOD 30 MIN: CPT | Mod: PBBFAC

## 2023-02-06 RX ORDER — HYOSCYAMINE SULFATE 0.125 MG
125 TABLET ORAL EVERY 6 HOURS PRN
COMMUNITY
End: 2023-12-11 | Stop reason: ALTCHOICE

## 2023-02-06 NOTE — PROGRESS NOTES
I have reviewed and concur with the resident's history, physical, assessment, and plan.  I have discussed with him all issues related to the diagnosis, workup and treatment plan. Care provided as reasonable and necessary.    Juancarlos Magdaleno MD  [facility]

## 2023-02-07 ENCOUNTER — CLINICAL SUPPORT (OUTPATIENT)
Dept: CARDIOLOGY | Facility: CLINIC | Age: 68
End: 2023-02-07
Payer: MEDICARE

## 2023-02-07 DIAGNOSIS — Z95.810 AICD (AUTOMATIC CARDIOVERTER/DEFIBRILLATOR) PRESENT: Primary | ICD-10-CM

## 2023-02-07 PROCEDURE — 93283 PRGRMG EVAL IMPLANTABLE DFB: CPT | Mod: PBBFAC | Performed by: INTERNAL MEDICINE

## 2023-02-07 PROCEDURE — 99212 OFFICE O/P EST SF 10 MIN: CPT | Mod: PBBFAC

## 2023-02-09 ENCOUNTER — TELEPHONE (OUTPATIENT)
Dept: CARDIOLOGY | Facility: CLINIC | Age: 68
End: 2023-02-09

## 2023-02-09 ENCOUNTER — OFFICE VISIT (OUTPATIENT)
Dept: CARDIOLOGY | Facility: CLINIC | Age: 68
End: 2023-02-09
Payer: MEDICARE

## 2023-02-09 VITALS
HEIGHT: 69 IN | WEIGHT: 237.19 LBS | OXYGEN SATURATION: 11 % | DIASTOLIC BLOOD PRESSURE: 98 MMHG | RESPIRATION RATE: 20 BRPM | TEMPERATURE: 98 F | SYSTOLIC BLOOD PRESSURE: 140 MMHG | BODY MASS INDEX: 35.13 KG/M2 | HEART RATE: 78 BPM

## 2023-02-09 DIAGNOSIS — E87.6 HYPOKALEMIA: ICD-10-CM

## 2023-02-09 DIAGNOSIS — I10 HYPERTENSION, UNSPECIFIED TYPE: ICD-10-CM

## 2023-02-09 DIAGNOSIS — I44.1 SECOND DEGREE AV BLOCK: ICD-10-CM

## 2023-02-09 DIAGNOSIS — I25.10 CORONARY ARTERY DISEASE INVOLVING NATIVE HEART WITHOUT ANGINA PECTORIS, UNSPECIFIED VESSEL OR LESION TYPE: ICD-10-CM

## 2023-02-09 DIAGNOSIS — I48.11 LONGSTANDING PERSISTENT ATRIAL FIBRILLATION: Primary | ICD-10-CM

## 2023-02-09 DIAGNOSIS — E78.5 HYPERLIPIDEMIA LDL GOAL <70: ICD-10-CM

## 2023-02-09 DIAGNOSIS — Z95.810 ICD (IMPLANTABLE CARDIOVERTER-DEFIBRILLATOR) IN PLACE: ICD-10-CM

## 2023-02-09 DIAGNOSIS — I34.0 MODERATE MITRAL REGURGITATION BY PRIOR ECHOCARDIOGRAM: ICD-10-CM

## 2023-02-09 DIAGNOSIS — E78.00 HIGH CHOLESTEROL: ICD-10-CM

## 2023-02-09 DIAGNOSIS — I49.01 CARDIAC ARREST WITH VENTRICULAR FIBRILLATION: ICD-10-CM

## 2023-02-09 DIAGNOSIS — I46.9 CARDIAC ARREST WITH VENTRICULAR FIBRILLATION: ICD-10-CM

## 2023-02-09 DIAGNOSIS — Z86.79 H/O VENTRICULAR FIBRILLATION: ICD-10-CM

## 2023-02-09 PROCEDURE — 99214 OFFICE O/P EST MOD 30 MIN: CPT | Mod: PBBFAC | Performed by: STUDENT IN AN ORGANIZED HEALTH CARE EDUCATION/TRAINING PROGRAM

## 2023-02-09 RX ORDER — SPIRONOLACTONE 50 MG/1
50 TABLET, FILM COATED ORAL DAILY
Qty: 90 TABLET | Refills: 3 | Status: SHIPPED | OUTPATIENT
Start: 2023-02-09 | End: 2023-05-02 | Stop reason: SDUPTHER

## 2023-02-09 RX ORDER — LOSARTAN POTASSIUM 50 MG/1
50 TABLET ORAL DAILY
Qty: 90 TABLET | Refills: 3 | Status: SHIPPED | OUTPATIENT
Start: 2023-02-09 | End: 2023-05-02 | Stop reason: SDUPTHER

## 2023-02-09 RX ORDER — DILTIAZEM HYDROCHLORIDE 240 MG/1
240 CAPSULE, COATED, EXTENDED RELEASE ORAL DAILY
Qty: 30 CAPSULE | Refills: 11 | Status: SHIPPED | OUTPATIENT
Start: 2023-02-09 | End: 2023-05-02

## 2023-02-09 RX ORDER — ATORVASTATIN CALCIUM 40 MG/1
40 TABLET, FILM COATED ORAL DAILY
Qty: 90 TABLET | Refills: 0 | Status: SHIPPED | OUTPATIENT
Start: 2023-02-09 | End: 2023-05-02 | Stop reason: SDUPTHER

## 2023-02-09 RX ORDER — NIFEDIPINE 30 MG/1
30 TABLET, FILM COATED, EXTENDED RELEASE ORAL DAILY
Qty: 90 TABLET | Refills: 3 | Status: SHIPPED | OUTPATIENT
Start: 2023-02-09 | End: 2023-02-09

## 2023-02-09 RX ORDER — DILTIAZEM HYDROCHLORIDE 180 MG/1
180 CAPSULE, COATED, EXTENDED RELEASE ORAL DAILY
Qty: 90 CAPSULE | Refills: 3 | Status: SHIPPED | OUTPATIENT
Start: 2023-02-09 | End: 2023-02-09 | Stop reason: SDUPTHER

## 2023-02-09 RX ORDER — RIVAROXABAN 20 MG/1
TABLET, FILM COATED ORAL
Qty: 90 TABLET | Refills: 3 | Status: ON HOLD | OUTPATIENT
Start: 2023-02-09 | End: 2024-01-12

## 2023-02-09 RX ORDER — METOPROLOL SUCCINATE 200 MG/1
200 TABLET, EXTENDED RELEASE ORAL 2 TIMES DAILY
Qty: 90 TABLET | Refills: 3 | Status: SHIPPED | OUTPATIENT
Start: 2023-02-09 | End: 2023-05-02 | Stop reason: SDUPTHER

## 2023-02-09 NOTE — PROGRESS NOTES
[facility]   Cardiology Clinic - Follow Up     Date of Visit: 2/9/2023  Reason for Visit/Chief Complaint:   Chief Complaint    f/u visit, denies chest pains or sob          I have explained to Mr. Delio Daniel Jr. that I am not a cardiologist. He understands that I am an internal medicine physician seeing patients in the cardiology clinic. Mr. Delio Daniel Jr. has expressed understanding of this fact and is willing to proceed with this visit.     The patient was discussed with the cardiologist at the time of the appointment.     History of Present Illness:      Delio Daniel Jr. is a 67 y.o. male with a PMH significant for HTN, HLD, small carcinoid tumor s/p resection, CAD w hx of STEMI in 2003, paroxysmal Afib on xarelto, HFpEF (55%) and second degree AV block s/ PPM ([facility])11/13/17 (upgraded to dcICD 5/18 2/2 Vfib arrest in 3/18 in setting of prolonged QT and hypoK)  who presents to cardiology clinic for follow up. He is feeling well today and has no CV complaints at this time.     Recent device check performed on 2/7/2023 shows 39% paced in the RV with 54 episodes of VT (21 sustained). Battery life is 1.5-2 years.     CP:  The patient has no chest discomfort.      SOB:  The patient denies shortness of breath No GRACE    EDEMA:  The patient denies edema.        ORTHOPNEA:  The patient denies orthopnea.  No PND.      SYNCOPE:  The patient denies near syncope.  No syncope.   No dizziness.    PALPITATIONS:  The patient has no palpitations.    LEVEL OF EXERTION:  The patient does house work and does not have symptoms with this level of exertion.  The patient's level of exertion is adequate.    Past Medical History:        Past Medical History:   Diagnosis Date    Arthritis     Atrial fibrillation     BPH (benign prostatic hyperplasia)     Cardiac arrest     Coronary artery disease     Cyst, kidney, acquired     Diverticulosis     Hyperlipidemia     Hypertension     MI (myocardial  infarction)     Obesity     Steatosis of liver        Surgical History:        Past Surgical History:   Procedure Laterality Date    A-V CARDIAC PACEMAKER INSERTION Right     CARDIAC CATHETERIZATION      COLONOSCOPY W/ BIOPSIES      excision of colon         Family History:        Family History   Problem Relation Age of Onset    Hypertension Mother     Hypertension Father     Hypertension Sister        Social History:        Social History     Tobacco Use    Smoking status: Former    Smokeless tobacco: Never   Substance Use Topics    Alcohol use: Not Currently    Drug use: Not Currently       Allergies:       Review of patient's allergies indicates:  No Known Allergies    Medications:        Current Outpatient Medications   Medication Sig Dispense Refill    aspirin 81 MG Chew chew and swallow 1 tablet by mouth daily 90 tablet 3    fluticasone propionate (FLONASE) 50 mcg/actuation nasal spray 2 sprays (100 mcg total) by Each Nostril route once daily. 15 g 0    hyoscyamine (ANASPAZ,LEVSIN) 0.125 mg Tab Take 125 mcg by mouth every 6 (six) hours as needed.      potassium chloride (K-TAB) 20 mEq Take 1 tablet (20 mEq total) by mouth 2 (two) times a day. 90 tablet 3    vitamin D (VITAMIN D3) 1000 units Tab Take 1 tablet (1,000 Units total) by mouth once daily. 30 tablet 5    albuterol (PROVENTIL/VENTOLIN HFA) 90 mcg/actuation inhaler Inhale 2 puffs into the lungs every 6 (six) hours as needed for Wheezing. Rescue 8.5 g 0    atorvastatin (LIPITOR) 40 MG tablet Take 1 tablet (40 mg total) by mouth once daily. 90 tablet 0    cetirizine (ZYRTEC) 10 MG tablet Take 1 tablet (10 mg total) by mouth once daily. for 14 days 14 tablet 0    diltiaZEM (CARDIZEM CD) 240 MG 24 hr capsule Take 1 capsule (240 mg total) by mouth once daily. 30 capsule 11    losartan (COZAAR) 50 MG tablet Take 1 tablet (50 mg total) by mouth once daily. 90 tablet 3    metoprolol succinate (TOPROL-XL) 200 MG 24 hr tablet Take 1 tablet (200 mg total) by  mouth 2 (two) times daily. 90 tablet 3    omeprazole (PRILOSEC) 20 MG capsule Take 1 capsule (20 mg total) by mouth once daily. 30 capsule 2    ondansetron (ZOFRAN-ODT) 4 MG TbDL Take 1 tablet (4 mg total) by mouth every 8 (eight) hours as needed (nausea/vomiting). (Patient not taking: Reported on 11/10/2022) 20 tablet 0    spironolactone (ALDACTONE) 50 MG tablet Take 1 tablet (50 mg total) by mouth once daily. 90 tablet 3    XARELTO 20 mg Tab TAKE 1 TABLET BY MOUTH DAILY with SUPPER 90 tablet 3     No current facility-administered medications for this visit.       I have reviewed and updated the patient's medications, allergies, past medical history, surgical history, social history and family history as needed.    Review of Systems:      Review of Systems   Constitutional:  Negative for chills, diaphoresis and fever.   HENT:  Negative for hearing loss and nosebleeds.    Eyes:  Negative for blurred vision.   Respiratory:  Negative for shortness of breath.    Cardiovascular:  Negative for chest pain, palpitations, orthopnea, claudication and PND.   Gastrointestinal:  Negative for nausea and vomiting.   Genitourinary:  Negative for dysuria.   Musculoskeletal:  Negative for myalgias.   Skin:  Negative for rash.   Neurological:  Negative for dizziness and headaches.     Objective:        Vitals:    02/09/23 1007   BP: (!) 140/98   Pulse:    Resp:    Temp:      Wt Readings from Last 3 Encounters:   02/09/23 107.6 kg (237 lb 3.4 oz)   02/06/23 104.1 kg (229 lb 6.4 oz)   01/30/23 102.5 kg (225 lb 15.5 oz)     Temp Readings from Last 3 Encounters:   02/09/23 97.6 °F (36.4 °C) (Oral)   02/06/23 96.4 °F (35.8 °C) (Oral)   01/30/23 98.5 °F (36.9 °C) (Oral)     BP Readings from Last 3 Encounters:   02/09/23 (!) 140/98   02/06/23 128/84   01/30/23 97/61     Pulse Readings from Last 3 Encounters:   02/09/23 78   02/06/23 71   01/30/23 73       Vitals:    02/09/23 0938 02/09/23 1007   BP: (!) 137/97 (!) 140/98   BP Location: Left  "arm    Patient Position: Sitting    BP Method: X-Large (Automatic)    Pulse: 78    Resp: 20    Temp: 97.6 °F (36.4 °C)    TempSrc: Oral    SpO2: (!) 11%    Weight: 107.6 kg (237 lb 3.4 oz)    Height: 5' 9" (1.753 m)      Body mass index is 35.03 kg/m².    Physical Exam  Constitutional:       Appearance: Normal appearance.   HENT:      Head: Normocephalic and atraumatic.   Eyes:      Conjunctiva/sclera: Conjunctivae normal.   Cardiovascular:      Rate and Rhythm: Rhythm irregularly irregular.      Heart sounds: Normal heart sounds. No murmur heard.    No friction rub. No gallop.   Pulmonary:      Effort: Pulmonary effort is normal.      Breath sounds: Normal breath sounds. No stridor. No wheezing, rhonchi or rales.   Abdominal:      General: Bowel sounds are normal. There is no distension.   Skin:     General: Skin is warm and dry.      Findings: No rash.   Neurological:      Mental Status: He is alert. Mental status is at baseline.   Psychiatric:         Mood and Affect: Mood normal.         Thought Content: Thought content normal.         Judgment: Judgment normal.        Labs:      I have reviewed the following labs below:      CBC:  Lab Results   Component Value Date    WBC 7.4 01/30/2023    HGB 13.3 (L) 01/30/2023    HCT 39.7 (L) 01/30/2023     01/30/2023    MCV 88.6 01/30/2023    RDW 13.7 01/30/2023     BMP:  Lab Results   Component Value Date     01/30/2023    K 3.3 (L) 01/30/2023    CO2 22 (L) 01/30/2023    BUN 21.2 01/30/2023    CALCIUM 9.0 01/30/2023    MG 1.60 01/30/2023    PHOS 3.9 07/03/2021     LFTs:  Lab Results   Component Value Date    ALBUMIN 3.7 01/30/2023    BILITOT 1.8 (H) 01/30/2023    AST 16 01/30/2023    ALKPHOS 58 01/30/2023    ALT 15 01/30/2023     FLP:  Cholesterol Total   Date Value Ref Range Status   07/03/2021 61 <<=200 mg/dL Final     HDL Cholesterol   Date Value Ref Range Status   07/03/2021 18 (L) 35 - 60 mg/dL Final     LDL Cholesterol   Date Value Ref Range Status "   07/03/2021 21.00 (L) 50.00 - 140.00 mg/dL Final     Triglyceride   Date Value Ref Range Status   07/03/2021 110 34 - 140 mg/dL Final     DM:  Lab Results   Component Value Date    HGBA1C 6.1 07/02/2021    HGBA1C 6.5 07/21/2020    HGBA1C 5.7 09/23/2019    CREATININE 2.10 (H) 01/30/2023     Thyroid:  Lab Results   Component Value Date    TSH 3.4289 07/02/2021     Anemia:  Lab Results   Component Value Date    FERRITIN 537.64 (H) 07/27/2020     Coags:  Lab Results   Component Value Date    INR 2.08 (H) 09/07/2022    PROTIME 22.8 09/07/2022      Cardiac:  Lab Results   Component Value Date    TROPONINI <0.010 01/30/2023    TROPONINI <0.010 10/26/2022    TROPONINI <0.010 10/01/2022    TROPONINI 0.013 09/08/2022    TROPONINI <0.010 09/07/2022    .0 (H) 12/27/2022    BNP 60.6 10/18/2021    .3 07/20/2020       Cardiac Studies/Imaging:        I have reviewed the following studies below:        Stress Test  Lexiscan 10/30/20- negative  Echo 2/2020 EF 55%, LVH, mild MR/TR, RSVP 28 mmHg  Device interrogation 4/21 revealed 32 NSVT, normal device function  Device interrogation 5/17/22: 32 NSVT-->Afib RVR; V rates 180-200 bpm. Asymptomatic. Normal functioning         Assessment & Plan:      67 y.o. male with the following medical problems:    CAD with MI in 2003  - No symptoms at this time  - Continue ASA, statin, beta blocker    Persistent AFib  - Last device check 2/7/2023 showing increasing RV pacing at 39%, >99% mode switch   - Dilt increased to 240mg  - Continue toprol 200mg BID  - If he continues to have increased pacing and episodes of VT despite optimization of medications, he will need to be referred for AV node ablation with BiV device upgrade.  - RTC on 5/2 after device check to determine next steps in management     2nd degree AVB s/p PPM-->upgrade to ICD for secondary prevention 2/2 VFib arrest  - 99% mode switch  - RV Paced 39%  - Battery Life 1.5-2 years   - Continue routine checks [id number]      HTN  - Patient did not take his meds this morning   - /98 - elevated due to not taking meds today   - Continue Losartan, Troprol, and spironolactone     Hypokalemia   - Etiology unknown  - will order renin & aldosterone levels - if normal, he will likely need repeat levels once off of Losartan and sprionolactone for 6 weeks to fully rule out primary hyperaldosteronism     HLD  - LDL 21  - Continue Atorvastatin  - will monitor LDL closely given low levels - if remains low, consider reducing atorvastatin dosed   - repeat FLP prior to next appointment       UPDATE: Moderate Mitral Regurgitation  - Echo ordered at the time of this appointment revealed MR  - Echo was reviewed with Dr. Villegas and determined to likely be secondary moderate MR  - Patient remains asymptomatic  - Plan for repeat echo 6 months (around September) to monitor - order placed   - will likely be able to monitor annually after that if findings remain the same in September and patient remains asymptomatic             Return to clinic in 3 months.      Future Appointments   Date Time Provider Department Center   5/2/2023  8:00 AM PACEMAKER, [facility] CARDIOLOGY [facility] CHERELLE Blum    5/2/2023  9:00 AM Cyndie Villegas MD [facility] CARD [place]    5/29/2023  7:30 AM RESIDENT 31, [facility] INTERNAL MEDICINE [facility] IM RES [place]    7/3/2023  9:00 AM [facility] CT1 450 LB LIMIT [facility] [place]    7/3/2023  9:30 AM [facility] CT1 450 LB LIMIT [facility] [place]    7/20/2023 12:00 PM NURSE, [facility]OLOGY [facility]Y [facility] [place]    7/20/2023  1:00 PM Lukas Reed MD [facility] [place]         Marylu Brown DO  Internal Medicine Physician   Department of Medicine   [facility]    02/09/2023 9:47 AM

## 2023-02-09 NOTE — TELEPHONE ENCOUNTER
Attempted to contact pt, no answer, no voicemail. Spoke to pt's daughter, gave her instructions to have pt get labs next week. She will relay message to pt.     ----- Message from Marylu Brown DO sent at 2/9/2023 10:43 AM CST -----  Please let Mr. Daniel know I would like to get some labwork on him. He can have it done the next time he is in the hospital in the morning.     Thanks,  Marylu

## 2023-02-10 ENCOUNTER — LAB VISIT (OUTPATIENT)
Dept: LAB | Facility: HOSPITAL | Age: 68
End: 2023-02-10
Attending: STUDENT IN AN ORGANIZED HEALTH CARE EDUCATION/TRAINING PROGRAM
Payer: MEDICARE

## 2023-02-10 DIAGNOSIS — E87.6 HYPOKALEMIA: ICD-10-CM

## 2023-02-10 DIAGNOSIS — I10 HYPERTENSION, UNSPECIFIED TYPE: ICD-10-CM

## 2023-02-10 PROCEDURE — 84244 ASSAY OF RENIN: CPT

## 2023-02-10 PROCEDURE — 36415 COLL VENOUS BLD VENIPUNCTURE: CPT

## 2023-02-10 PROCEDURE — 82088 ASSAY OF ALDOSTERONE: CPT

## 2023-02-12 ENCOUNTER — HOSPITAL ENCOUNTER (EMERGENCY)
Facility: HOSPITAL | Age: 68
Discharge: HOME | End: 2023-02-12
Attending: INTERNAL MEDICINE
Payer: MEDICARE

## 2023-02-12 VITALS
RESPIRATION RATE: 18 BRPM | OXYGEN SATURATION: 100 % | WEIGHT: 237 LBS | HEART RATE: 70 BPM | TEMPERATURE: 98 F | DIASTOLIC BLOOD PRESSURE: 73 MMHG | BODY MASS INDEX: 35 KG/M2 | SYSTOLIC BLOOD PRESSURE: 130 MMHG

## 2023-02-12 DIAGNOSIS — M54.50 ACUTE EXACERBATION OF CHRONIC LOW BACK PAIN: Primary | ICD-10-CM

## 2023-02-12 DIAGNOSIS — G89.29 ACUTE EXACERBATION OF CHRONIC LOW BACK PAIN: Primary | ICD-10-CM

## 2023-02-12 PROCEDURE — 99283 EMERGENCY DEPT VISIT LOW MDM: CPT

## 2023-02-12 PROCEDURE — 25000003 PHARM REV CODE 250: Performed by: NURSE PRACTITIONER

## 2023-02-12 RX ORDER — TRAMADOL HYDROCHLORIDE 50 MG/1
50 TABLET ORAL EVERY 12 HOURS PRN
Qty: 10 TABLET | Refills: 0 | Status: SHIPPED | OUTPATIENT
Start: 2023-02-12 | End: 2023-02-17

## 2023-02-12 RX ORDER — TRAMADOL HYDROCHLORIDE 50 MG/1
50 TABLET ORAL
Status: COMPLETED | OUTPATIENT
Start: 2023-02-12 | End: 2023-02-12

## 2023-02-12 RX ADMIN — TRAMADOL HYDROCHLORIDE 50 MG: 50 TABLET, COATED ORAL at 10:02

## 2023-02-12 NOTE — ED PROVIDER NOTES
Encounter Date: 2/12/2023       History     Chief Complaint   Patient presents with    Back Pain     Chronic back pain     Pt is a 67 y.o. male who presents to the [facility] ED complaining of lower back pain. Hx of chronic pain to back. Denies relief with home mediations. Says flare of pain has been for the last 2 days. Denies recent injury to lower back, chest pain, SOB, weakness, dizziness, pain with urination, urinary frequency, fever, or loss of bowel or bladder control. Pt currently on Xarelto for Atrial fib. Denies free bleeding, rectal bleeding, black stool, or maroon stool.     Review of patient's allergies indicates:  No Known Allergies  Past Medical History:   Diagnosis Date    Arthritis     Atrial fibrillation     BPH (benign prostatic hyperplasia)     Cardiac arrest     Coronary artery disease     Cyst, kidney, acquired     Diverticulosis     Hyperlipidemia     Hypertension     MI (myocardial infarction)     Obesity     Steatosis of liver      Past Surgical History:   Procedure Laterality Date    A-V CARDIAC PACEMAKER INSERTION Right     CARDIAC CATHETERIZATION      COLONOSCOPY W/ BIOPSIES      excision of colon       Family History   Problem Relation Age of Onset    Hypertension Mother     Hypertension Father     Hypertension Sister      Social History     Tobacco Use    Smoking status: Former    Smokeless tobacco: Never   Substance Use Topics    Alcohol use: Not Currently    Drug use: Not Currently     Review of Systems   Constitutional:  Negative for chills, diaphoresis, fatigue and fever.   HENT:  Negative for facial swelling, postnasal drip, rhinorrhea, sinus pressure, sinus pain, sore throat and trouble swallowing.    Respiratory:  Negative for cough, chest tightness, shortness of breath and wheezing.    Cardiovascular:  Negative for chest pain, palpitations and leg swelling.   Gastrointestinal:  Negative for abdominal pain, diarrhea, nausea and vomiting.   Genitourinary:  Negative for dysuria, flank  pain, hematuria and urgency.   Musculoskeletal:  Positive for back pain. Negative for arthralgias and myalgias.   Skin:  Negative for color change and rash.   Neurological:  Negative for dizziness, syncope, weakness and headaches.   Hematological:  Does not bruise/bleed easily.   All other systems reviewed and are negative.    Physical Exam     Initial Vitals [02/12/23 1002]   BP Pulse Resp Temp SpO2   130/73 70 18 98.1 °F (36.7 °C) 100 %      MAP       --         Physical Exam    Nursing note and vitals reviewed.  Constitutional: Vital signs are normal. He appears well-developed and well-nourished.   HENT:   Head: Normocephalic.   Nose: Nose normal.   Mouth/Throat: Oropharynx is clear and moist.   Eyes: Conjunctivae and EOM are normal. Pupils are equal, round, and reactive to light.   Neck: Neck supple.   Normal range of motion.  Cardiovascular:  Normal rate, regular rhythm, normal heart sounds and intact distal pulses.           Pulmonary/Chest: Effort normal and breath sounds normal. No respiratory distress. He has no wheezes. He has no rhonchi. He has no rales. He exhibits no tenderness.   Abdominal: Abdomen is soft and flat. Bowel sounds are normal. There is no abdominal tenderness. There is no rebound, no guarding, no tenderness at McBurney's point and negative Lake's sign.   Musculoskeletal:         General: Normal range of motion.      Cervical back: Normal range of motion and neck supple.      Lumbar back: Spasms and tenderness present. No swelling, edema, deformity or signs of trauma. Normal range of motion.        Back:      Neurological: He is alert and oriented to person, place, and time. He has normal strength.   Skin: Skin is warm and dry. Capillary refill takes less than 2 seconds.   Psychiatric: He has a normal mood and affect. His behavior is normal. Judgment and thought content normal.       ED Course   Procedures  Labs Reviewed - No data to display       Imaging Results    None           Medications   traMADoL tablet 50 mg (50 mg Oral Given 2/12/23 8919)     Medical Decision Making:   Differential Diagnosis:   DDD  Chronic back pain  ED Management:  10:52 AM Reassessed patient at this time. Reports condition has improved. Discussed with patient all pertinent ED information and results. Discussed diagnosis and treatment plan with patient. Follow up instructions and return to ED instruction have been given. All questions and concerns were addressed at this time. Patient voices understanding of information and instructions. Patient is comfortable with plan and discharge. Patient is stable for discharge.                           Clinical Impression:   Final diagnoses:  [M54.50, G89.29] Acute exacerbation of chronic low back pain (Primary)        ED Disposition Condition    Discharge Stable          ED Prescriptions       Medication Sig Dispense Start Date End Date Auth. Provider    traMADoL (ULTRAM) 50 mg tablet Take 1 tablet (50 mg total) by mouth every 12 (twelve) hours as needed for Pain. 10 tablet 2/12/2023 2/17/2023 Tani Hernandez Jr., TRUMAN          Follow-up Information       Follow up With Specialties Details Why Contact Info    [facility] Emergency Medicine In 3 days As needed, If symptoms worsen [address] [facility] [id number] [phone]             TRUMAN Garcia Jr.  02/12/23 5182

## 2023-02-13 LAB — ALDOST SERPL-MCNC: 20 NG/DL

## 2023-02-15 LAB — RENIN PLAS-CCNC: <0.6 NG/ML/H

## 2023-03-05 ENCOUNTER — HOSPITAL ENCOUNTER (EMERGENCY)
Facility: HOSPITAL | Age: 68
Discharge: HOME | End: 2023-03-05
Attending: EMERGENCY MEDICINE
Payer: MEDICARE

## 2023-03-05 VITALS
HEIGHT: 69 IN | TEMPERATURE: 98 F | HEART RATE: 75 BPM | BODY MASS INDEX: 35.55 KG/M2 | WEIGHT: 240 LBS | SYSTOLIC BLOOD PRESSURE: 147 MMHG | RESPIRATION RATE: 18 BRPM | DIASTOLIC BLOOD PRESSURE: 103 MMHG | OXYGEN SATURATION: 99 %

## 2023-03-05 DIAGNOSIS — R10.9 LEFT FLANK PAIN: ICD-10-CM

## 2023-03-05 DIAGNOSIS — R10.12 LEFT UPPER QUADRANT ABDOMINAL PAIN: Primary | ICD-10-CM

## 2023-03-05 LAB
ALBUMIN SERPL-MCNC: 3.8 G/DL (ref 3.4–4.8)
ALBUMIN/GLOB SERPL: 1.2 RATIO (ref 1.1–2)
ALP SERPL-CCNC: 61 UNIT/L (ref 40–150)
ALT SERPL-CCNC: 14 UNIT/L (ref 0–55)
APPEARANCE UR: CLEAR
AST SERPL-CCNC: 17 UNIT/L (ref 5–34)
BACTERIA #/AREA URNS AUTO: ABNORMAL /HPF
BASOPHILS # BLD AUTO: 0.07 X10(3)/MCL (ref 0–0.2)
BASOPHILS NFR BLD AUTO: 1 %
BILIRUB UR QL STRIP.AUTO: NEGATIVE MG/DL
BILIRUBIN DIRECT+TOT PNL SERPL-MCNC: 1.6 MG/DL
BUN SERPL-MCNC: 13.3 MG/DL (ref 8.4–25.7)
CALCIUM SERPL-MCNC: 9.4 MG/DL (ref 8.8–10)
CHLORIDE SERPL-SCNC: 109 MMOL/L (ref 98–107)
CO2 SERPL-SCNC: 23 MMOL/L (ref 23–31)
COLOR UR AUTO: ABNORMAL
CREAT SERPL-MCNC: 0.97 MG/DL (ref 0.73–1.18)
EOSINOPHIL # BLD AUTO: 0.12 X10(3)/MCL (ref 0–0.9)
EOSINOPHIL NFR BLD AUTO: 1.8 %
ERYTHROCYTE [DISTWIDTH] IN BLOOD BY AUTOMATED COUNT: 14.1 % (ref 11.5–17)
GFR SERPLBLD CREATININE-BSD FMLA CKD-EPI: >60 MLS/MIN/1.73/M2
GLOBULIN SER-MCNC: 3.3 GM/DL (ref 2.4–3.5)
GLUCOSE SERPL-MCNC: 100 MG/DL (ref 82–115)
GLUCOSE UR QL STRIP.AUTO: NORMAL MG/DL
HCT VFR BLD AUTO: 37.9 % (ref 42–52)
HGB BLD-MCNC: 13.2 G/DL (ref 14–18)
HYALINE CASTS #/AREA URNS LPF: ABNORMAL /LPF
IMM GRANULOCYTES # BLD AUTO: 0.03 X10(3)/MCL (ref 0–0.04)
IMM GRANULOCYTES NFR BLD AUTO: 0.4 %
KETONES UR QL STRIP.AUTO: NEGATIVE MG/DL
LEUKOCYTE ESTERASE UR QL STRIP.AUTO: NEGATIVE UNIT/L
LIPASE SERPL-CCNC: 30 U/L
LYMPHOCYTES # BLD AUTO: 1.84 X10(3)/MCL (ref 0.6–4.6)
LYMPHOCYTES NFR BLD AUTO: 27.3 %
MCH RBC QN AUTO: 30.7 PG
MCHC RBC AUTO-ENTMCNC: 34.8 G/DL (ref 33–36)
MCV RBC AUTO: 88.1 FL (ref 80–94)
MONOCYTES # BLD AUTO: 0.67 X10(3)/MCL (ref 0.1–1.3)
MONOCYTES NFR BLD AUTO: 9.9 %
MUCOUS THREADS URNS QL MICRO: ABNORMAL /LPF
NEUTROPHILS # BLD AUTO: 4.01 X10(3)/MCL (ref 2.1–9.2)
NEUTROPHILS NFR BLD AUTO: 59.6 %
NITRITE UR QL STRIP.AUTO: NEGATIVE
NRBC BLD AUTO-RTO: 0 %
PH UR STRIP.AUTO: 6.5 [PH]
PLATELET # BLD AUTO: 236 X10(3)/MCL (ref 130–400)
PMV BLD AUTO: 9.9 FL (ref 7.4–10.4)
POTASSIUM SERPL-SCNC: 4 MMOL/L (ref 3.5–5.1)
PROT SERPL-MCNC: 7.1 GM/DL (ref 5.8–7.6)
PROT UR QL STRIP.AUTO: ABNORMAL MG/DL
RBC # BLD AUTO: 4.3 X10(6)/MCL (ref 4.7–6.1)
RBC #/AREA URNS AUTO: ABNORMAL /HPF
RBC UR QL AUTO: NEGATIVE UNIT/L
SODIUM SERPL-SCNC: 142 MMOL/L (ref 136–145)
SP GR UR STRIP.AUTO: 1.01
SQUAMOUS #/AREA URNS LPF: ABNORMAL /HPF
UROBILINOGEN UR STRIP-ACNC: NORMAL MG/DL
WBC # SPEC AUTO: 6.7 X10(3)/MCL (ref 4.5–11.5)
WBC #/AREA URNS AUTO: ABNORMAL /HPF

## 2023-03-05 PROCEDURE — 85025 COMPLETE CBC W/AUTO DIFF WBC: CPT | Performed by: EMERGENCY MEDICINE

## 2023-03-05 PROCEDURE — 81001 URINALYSIS AUTO W/SCOPE: CPT | Performed by: EMERGENCY MEDICINE

## 2023-03-05 PROCEDURE — 99285 EMERGENCY DEPT VISIT HI MDM: CPT | Mod: 25

## 2023-03-05 PROCEDURE — 80053 COMPREHEN METABOLIC PANEL: CPT | Performed by: EMERGENCY MEDICINE

## 2023-03-05 PROCEDURE — 93005 ELECTROCARDIOGRAM TRACING: CPT

## 2023-03-05 PROCEDURE — 83690 ASSAY OF LIPASE: CPT | Performed by: EMERGENCY MEDICINE

## 2023-03-05 RX ORDER — SUCRALFATE 1 G/1
1 TABLET ORAL 2 TIMES DAILY
Qty: 60 TABLET | Refills: 0 | Status: SHIPPED | OUTPATIENT
Start: 2023-03-05 | End: 2023-04-04

## 2023-03-05 RX ORDER — KETOROLAC TROMETHAMINE 10 MG/1
10 TABLET, FILM COATED ORAL EVERY 6 HOURS PRN
Qty: 10 TABLET | Refills: 0 | Status: SHIPPED | OUTPATIENT
Start: 2023-03-05 | End: 2023-03-10

## 2023-03-05 NOTE — ED PROVIDER NOTES
Encounter Date: 3/5/2023       History     Chief Complaint   Patient presents with    Abdominal Pain     Pt arrives via [facility] with c/o abd pain and back pain x1 day     66 yo M presents to ED for LUQ abdominal and L flank pain. States it is reoccurring and is unchanged from prior. (Pt was initially seen by Dr. Salcido) worsen by palpation and movement. No dysuria, no fever chills, reports hx of GERD, states by my interview symptoms are grossly resolved. No urine or bowel changes. No trauma, no other complaints or concerns at this time.     Review of patient's allergies indicates:  No Known Allergies  Past Medical History:   Diagnosis Date    Arthritis     Atrial fibrillation     BPH (benign prostatic hyperplasia)     Cardiac arrest     Coronary artery disease     Cyst, kidney, acquired     Diverticulosis     Hyperlipidemia     Hypertension     MI (myocardial infarction)     Obesity     Steatosis of liver      Past Surgical History:   Procedure Laterality Date    A-V CARDIAC PACEMAKER INSERTION Right     CARDIAC CATHETERIZATION      COLONOSCOPY W/ BIOPSIES      excision of colon       Family History   Problem Relation Age of Onset    Hypertension Mother     Hypertension Father     Hypertension Sister      Social History     Tobacco Use    Smoking status: Former    Smokeless tobacco: Never   Substance Use Topics    Alcohol use: Not Currently    Drug use: Not Currently     Review of Systems   Constitutional:  Negative for chills and fever.   HENT:  Negative for congestion, rhinorrhea and sore throat.    Eyes:  Negative for pain, discharge and itching.   Respiratory:  Negative for chest tightness and shortness of breath.    Cardiovascular:  Negative for chest pain and palpitations.   Gastrointestinal:  Positive for abdominal pain. Negative for nausea and vomiting.   Genitourinary:  Positive for flank pain. Negative for dysuria and hematuria.   Musculoskeletal:  Negative for myalgias and neck  pain.   Skin:  Negative for color change and rash.   Neurological:  Negative for dizziness, weakness and headaches.   Psychiatric/Behavioral:  Negative for confusion. The patient is not hyperactive.      Physical Exam     Initial Vitals [03/05/23 0615]   BP Pulse Resp Temp SpO2   (!) 135/105 82 14 98.2 °F (36.8 °C) 100 %      MAP       --         Physical Exam    Constitutional: He appears well-developed and well-nourished. He is not diaphoretic. No distress.   HENT:   Head: Normocephalic and atraumatic.   Eyes: Conjunctivae and EOM are normal. Pupils are equal, round, and reactive to light.   Neck: Neck supple. No tracheal deviation present.   Normal range of motion.  Cardiovascular:  Normal rate, regular rhythm and normal heart sounds.           Pulmonary/Chest: Breath sounds normal. No respiratory distress.   Abdominal: Abdomen is soft. There is abdominal tenderness in the left upper quadrant.   No right CVA tenderness.  No left CVA tenderness. There is no rebound, no guarding, no tenderness at McBurney's point and negative Lake's sign. negative Rovsing's sign  Musculoskeletal:         General: No tenderness. Normal range of motion.      Cervical back: Normal range of motion and neck supple.     Neurological: He is alert and oriented to person, place, and time. He has normal strength. GCS score is 15. GCS eye subscore is 4. GCS verbal subscore is 5. GCS motor subscore is 6.   Skin: Skin is warm and dry. Capillary refill takes less than 2 seconds. No rash noted.   Psychiatric: He has a normal mood and affect. His behavior is normal. Judgment and thought content normal.       ED Course   Procedures  Labs Reviewed   COMPREHENSIVE METABOLIC PANEL - Abnormal; Notable for the following components:       Result Value    Chloride 109 (*)     Bilirubin Total 1.6 (*)     All other components within normal limits   URINALYSIS, REFLEX TO URINE CULTURE - Abnormal; Notable for the following components:    Protein, UA Trace  (*)     Mucous, UA Trace (*)     All other components within normal limits   CBC WITH DIFFERENTIAL - Abnormal; Notable for the following components:    RBC 4.30 (*)     Hgb 13.2 (*)     Hct 37.9 (*)     All other components within normal limits   LIPASE - Normal   CBC W/ AUTO DIFFERENTIAL    Narrative:     The following orders were created for panel order CBC auto differential.  Procedure                               Abnormality         Status                     ---------                               -----------         ------                     CBC with Differential[[id number]]        Abnormal            Final result                 Please view results for these tests on the individual orders.     EKG Readings: (Independently Interpreted)   Initial Reading: No STEMI. Rhythm: Atrial Flutter. Ectopy: PVCs. Conduction: Normal. Axis: Normal. Clinical Impression: Atrial Flutter with PVCs   ECG Results              EKG 12-lead (Final result)  Result time 03/06/23 11:09:35      Final result by Interface, Lab In [facility] (03/06/23 11:09:35)                   Narrative:    Test Reason : R10.9,    Vent. Rate : 073 BPM     Atrial Rate : 357 BPM     P-R Int : 000 ms          QRS Dur : 090 ms      QT Int : 408 ms       P-R-T Axes : 000 037 -47 degrees     QTc Int : 449 ms    Atrial fibrillation with frequent ventricular-paced complexes  ST and T wave abnormality, consider anterolateral ischemia  Abnormal ECG  When compared with ECG of 30-JAN-2023 05:24,  Vent. rate has increased BY   7 BPM  Confirmed by Kye Parekh MD ([id number]) on 3/6/2023 11:09:27 AM    Referred By: [email or web address]   SELF           Confirmed By:Kye Parekh MD                                     EKG 12-LEAD (Final result)  Result time 03/09/23 12:05:51      Final result by Unknown User (03/09/23 12:05:51)                                      Imaging Results              CT Renal Stone Study ABD Pelvis WO (Final result)  Result time 03/05/23 10:58:04      Final  result by Grzegorz Cabrera MD (03/05/23 10:58:04)                   Impression:      No acute abnormalities are seen.    [facility] concordance      Electronically signed by: Grzegorz Cabrera MD  Date:    03/05/2023  Time:    10:58               Narrative:    EXAMINATION:  CT RENAL STONE STUDY ABD PELVIS WO    CLINICAL HISTORY:  Flank pain, kidney stone suspected;    TECHNIQUE:  Low dose axial images, sagittal and coronal reformations were obtained from the lung bases to the pubic symphysis.  Contrast was not administered.    Automatic exposure control (AEC) was utilized for dose reduction.    Dose: 401 mGycm    COMPARISON:  01/30/2023    FINDINGS:  Lung bases appear clear.  Liver appears normal.  Spleen appears normal.  Pancreas appears normal.  Biliary system appears normal.  The adrenals are not enlarged.  There is a right renal cyst.  Aorta shows no evidence of an aneurysm.  There is no evidence of acute diverticulitis the appendix appears normal.                        Preliminary result by Grzegorz Cabrera MD (03/05/23 08:06:09)                   Narrative:    START OF REPORT:  Technique: CT of the abdomen and pelvis was performed with axial images as well as sagittal and coronal reconstruction images without intravenous contrast.    Comparison: Comparison is with study dated â2023-01-30 03:39:44â.    Clinical History: CT Renal Stone Study ABD Pelvis WO Flank pain, kidney stone suspected.    Dosage Information: Automated Exposure Control was utilized 401.31 mGy.cm.    Findings:  Lines and Tubes: None.  Thorax:  Lungs: A small lung cyst is seen in the right lower lobe. The rest of the lungs are clear.  Pleura: No effusions or thickening.  Heart: Moderate cardiomegaly is seen. Pacer leads are seen in the visualized heart.  Abdomen:  Abdominal Wall: No abdominal wall pathology is seen.  Liver: The liver appears unremarkable.  Biliary System: No intrahepatic or extrahepatic biliary duct dilatation is  seen.  Gallbladder: The gallbladder appears unremarkable.  Pancreas: The pancreas appears unremarkable.  Spleen: The spleen appears unremarkable.  Adrenals: The adrenal glands appear unremarkable.  Kidneys: A single cyst measuring â1.6 cmâ is seen on âImage 35, Series 2â mid pole of the left kidney, unchanged. The left kidney otherwise appears unremarkable with no stones or hydronephrosis identified. A single cyst measuring â3.4 cmâ is seen on âImage 42, Series 2â in the mid pole of the right kidney, unchanged. The right kidney otherwise appears unremarkable with no stones or hydronephrosis identified.  Aorta: There is minimal calcification of the abdominal aorta.  IVC: Unremarkable.  Bowel:  Esophagus: The visualized esophagus appears unremarkable.  Stomach: The stomach appears unremarkable.  Duodenum: Unremarkable appearing duodenum.  Small Bowel: Surgical sutures are seen along the distal ileum. Otherwise unremarkable appearing small bowels.  Colon: Nondistended.  Appendix: The appendix appears unremarkable and is seen on âImage 68, Series 2â through âImage 59, Series 2â.  Peritoneum: No intraperitoneal free air or ascites is seen.    Pelvis:  Bladder: The bladder is nondistended but appears otherwise unremarkable.  Male:  Prostate gland: There are multiple calcifications prostate gland.    Bony structures:  Dorsal Spine: There is moderate spondylosis of the visualized dorsal spine.      Impression:  1. No acute intraabdominal or pelvic solid organ or bowel pathology identified. Details and other findings as discussed above.                          Preliminary result by Jose Cullen MD (03/05/23 08:06:09)                   Narrative:    START OF REPORT:  Technique: CT of the abdomen and pelvis was performed with axial images as well as sagittal and coronal reconstruction images without intravenous contrast.    Comparison: Comparison is with study dated â2023-01-30  03:39:44â.    Clinical History: CT Renal Stone Study ABD Pelvis WO Flank pain, kidney stone suspected.    Dosage Information: Automated Exposure Control was utilized 401.31 mGy.cm.    Findings:  Lines and Tubes: None.  Thorax:  Lungs: A small lung cyst is seen in the right lower lobe. The rest of the lungs are clear.  Pleura: No effusions or thickening.  Heart: Moderate cardiomegaly is seen. Pacer leads are seen in the visualized heart.  Abdomen:  Abdominal Wall: No abdominal wall pathology is seen.  Liver: The liver appears unremarkable.  Biliary System: No intrahepatic or extrahepatic biliary duct dilatation is seen.  Gallbladder: The gallbladder appears unremarkable.  Pancreas: The pancreas appears unremarkable.  Spleen: The spleen appears unremarkable.  Adrenals: The adrenal glands appear unremarkable.  Kidneys: A single cyst measuring â1.6 cmâ is seen on âImage 35, Series 2â mid pole of the left kidney, unchanged. The left kidney otherwise appears unremarkable with no stones or hydronephrosis identified. A single cyst measuring â3.4 cmâ is seen on âImage 42, Series 2â in the mid pole of the right kidney, unchanged. The right kidney otherwise appears unremarkable with no stones or hydronephrosis identified.  Aorta: There is minimal calcification of the abdominal aorta.  IVC: Unremarkable.  Bowel:  Esophagus: The visualized esophagus appears unremarkable.  Stomach: The stomach appears unremarkable.  Duodenum: Unremarkable appearing duodenum.  Small Bowel: Surgical sutures are seen along the distal ileum. Otherwise unremarkable appearing small bowels.  Colon: Nondistended.  Appendix: The appendix appears unremarkable and is seen on âImage 68, Series 2â through âImage 59, Series 2â.  Peritoneum: No intraperitoneal free air or ascites is seen.    Pelvis:  Bladder: The bladder is nondistended but appears otherwise unremarkable.  Male:  Prostate gland: There are multiple calcifications  prostate gland.    Bony structures:  Dorsal Spine: There is moderate spondylosis of the visualized dorsal spine.      Impression:  1. No acute intraabdominal or pelvic solid organ or bowel pathology identified. Details and other findings as discussed above.                                         Medications - No data to display  Medical Decision Making:   History:   Old Medical Records: I decided to obtain old medical records.  Initial Assessment:   Chronic intermittent left upper quadrant and left flank pain  Differential Diagnosis:   Pyelonephritis  Renal colic  Trauma  Splenic infarct  Constipation  Diverticulitis  Clinical Tests:   Lab Tests: Reviewed and Ordered  Radiological Study: Ordered and Reviewed  Medical Tests: Reviewed and Ordered  ED Management:  Patient was initially seen by Dr. Salcido and signed out to me upon conclusion of his shift.  Patient in no acute distress and on exam had very minimal left upper quadrant discomfort.  No rebound guarding or rigidity.  EKG nonischemic.  Suggestive of aflutter with PVC.  Ultimately secondary to age CT abdomen performed that was grossly negative.  Labs also unrevealing including normal kidney function. patient prescribed additional gastro protective medication request of Dr. Salcido given history of reflux as well as nonnarcotic pain medication.  Patient is to follow up closely in the outpatient setting.  Strict return precautions provided and stable for release.  Voiced understanding. (Melodie)           ED Course as of 03/19/23 0131   Sun Mar 05, 2023   0832 Patient reassessed.  Lying comfortably.  No acute distress.  Specifically request pharmacy scripts get sent to [facility] even though pharmacy is closed today.  Will  tomorrow. [RZ]      ED Course User Index  [RZ] Humberto Sewell MD                 Clinical Impression:   Final diagnoses:  [R10.12] Left upper quadrant abdominal pain (Primary)  [R10.9] Left flank pain        ED Disposition Condition     Discharge Stable          ED Prescriptions       Medication Sig Dispense Start Date End Date Auth. Provider    sucralfate (CARAFATE) 1 gram tablet Take 1 tablet (1 g total) by mouth 2 (two) times daily. 60 tablet 3/5/2023 2023 Humberto Sewell MD    ketorolac (TORADOL) 10 mg tablet () Take 1 tablet (10 mg total) by mouth every 6 (six) hours as needed for Pain. 10 tablet 3/5/2023 3/10/2023 Humberto Sewell MD          Follow-up Information       Follow up With Specialties Details Why Contact Info    [facility] Emergency Medicine  As needed, If symptoms worsen [address] [facility] [place] [address] [phone]    Primary  Schedule an appointment as soon as possible for a visit in 3 days               Humberot Sewell MD  23 013

## 2023-03-05 NOTE — ED PROVIDER NOTES
Encounter Date: 3/5/2023       History     Chief Complaint   Patient presents with    Abdominal Pain     Pt arrives via [facility] with c/o abd pain and back pain x1 day     Mr. Delio Daniel Jr. is a 68 yo male who presents with chief complaint abdominal pain. Onset of symptoms last night whenever he began having some sharp left-sided abdominal pain which was followed by back pain on the same side that he states has been constant, nothing specifically makes it better or worse.  Associated symptoms of nausea.  States he is had this in the past and was given some type of medicine but does not recall what it was.  Currently states he is no longer feeling nauseated in his pain has improved.  Denies vomiting, diarrhea, chest pain, shortness of breath, fevers.    The history is provided by the patient.   Review of patient's allergies indicates:  No Known Allergies  Past Medical History:   Diagnosis Date    Arthritis     Atrial fibrillation     BPH (benign prostatic hyperplasia)     Cardiac arrest     Coronary artery disease     Cyst, kidney, acquired     Diverticulosis     Hyperlipidemia     Hypertension     MI (myocardial infarction)     Obesity     Steatosis of liver      Past Surgical History:   Procedure Laterality Date    A-V CARDIAC PACEMAKER INSERTION Right     CARDIAC CATHETERIZATION      COLONOSCOPY W/ BIOPSIES      excision of colon       Family History   Problem Relation Age of Onset    Hypertension Mother     Hypertension Father     Hypertension Sister      Social History     Tobacco Use    Smoking status: Former    Smokeless tobacco: Never   Substance Use Topics    Alcohol use: Not Currently    Drug use: Not Currently     Review of Systems    Physical Exam     Initial Vitals [03/05/23 0615]   BP Pulse Resp Temp SpO2   (!) 135/105 82 14 98.2 °F (36.8 °C) 100 %      MAP       --         Physical Exam    Nursing note and vitals reviewed.  Constitutional: He appears well-developed and well-nourished. No distress.    HENT:   Head: Normocephalic and atraumatic.   Nose: Nose normal.   Mouth/Throat: Oropharynx is clear and moist and mucous membranes are normal.   Eyes: Conjunctivae and EOM are normal. Pupils are equal, round, and reactive to light.   Neck: Neck supple. No tracheal deviation present.   Cardiovascular:  Normal rate, normal heart sounds, intact distal pulses and normal pulses. An irregular rhythm present.           Pulmonary/Chest: Effort normal and breath sounds normal. No respiratory distress.   Abdominal: Abdomen is soft. There is abdominal tenderness in the left upper quadrant.   There is left CVA tenderness. There is no rebound and no guarding.   Musculoskeletal:         General: Normal range of motion.      Cervical back: Neck supple.     Neurological: He is alert and oriented to person, place, and time. GCS score is 15.   CN II-XII intact. Moves all extremities. No gross sensory or motor deficits.   Skin: Skin is warm, dry and intact.   Psychiatric: He has a normal mood and affect. His speech is normal and behavior is normal. Judgment and thought content normal. Cognition and memory are normal.       ED Course   Procedures  Labs Reviewed   COMPREHENSIVE METABOLIC PANEL - Abnormal; Notable for the following components:       Result Value    Chloride 109 (*)     Bilirubin Total 1.6 (*)     All other components within normal limits   URINALYSIS, REFLEX TO URINE CULTURE - Abnormal; Notable for the following components:    Protein, UA Trace (*)     Mucous, UA Trace (*)     All other components within normal limits   CBC WITH DIFFERENTIAL - Abnormal; Notable for the following components:    RBC 4.30 (*)     Hgb 13.2 (*)     Hct 37.9 (*)     All other components within normal limits   LIPASE - Normal   CBC W/ AUTO DIFFERENTIAL    Narrative:     The following orders were created for panel order CBC auto differential.  Procedure                               Abnormality         Status                     ---------                                -----------         ------                     CBC with Differential[[id number]]        Abnormal            Final result                 Please view results for these tests on the individual orders.     EKG Readings: (Independently Interpreted)   Initial Reading: No STEMI. Rhythm: Atrial Fibrillation. Heart Rate: 73. Axis: Normal. Clinical Impression: Paced Rhythm   ECG Results              EKG 12-lead (Final result)  Result time 03/06/23 11:09:35      Final result by Interface, Lab In [facility] (03/06/23 11:09:35)                   Narrative:    Test Reason : R10.9,    Vent. Rate : 073 BPM     Atrial Rate : 357 BPM     P-R Int : 000 ms          QRS Dur : 090 ms      QT Int : 408 ms       P-R-T Axes : 000 037 -47 degrees     QTc Int : 449 ms    Atrial fibrillation with frequent ventricular-paced complexes  ST and T wave abnormality, consider anterolateral ischemia  Abnormal ECG  When compared with ECG of 30-JAN-2023 05:24,  Vent. rate has increased BY   7 BPM  Confirmed by Kye Parekh MD ([id number]) on 3/6/2023 11:09:27 AM    Referred By: [email or web address]   SELF           Confirmed By:Kye Parekh MD                                  Imaging Results              CT Renal Stone Study ABD Pelvis WO (Final result)  Result time 03/05/23 10:58:04      Final result by Grzegorz Cabrera MD (03/05/23 10:58:04)                   Impression:      No acute abnormalities are seen.    [employer] concordance      Electronically signed by: Grzegorz Cabrera MD  Date:    03/05/2023  Time:    10:58               Narrative:    EXAMINATION:  CT RENAL STONE STUDY ABD PELVIS WO    CLINICAL HISTORY:  Flank pain, kidney stone suspected;    TECHNIQUE:  Low dose axial images, sagittal and coronal reformations were obtained from the lung bases to the pubic symphysis.  Contrast was not administered.    Automatic exposure control (AEC) was utilized for dose reduction.    Dose: 401 mGycm    COMPARISON:  01/30/2023    FINDINGS:  Lung  bases appear clear.  Liver appears normal.  Spleen appears normal.  Pancreas appears normal.  Biliary system appears normal.  The adrenals are not enlarged.  There is a right renal cyst.  Aorta shows no evidence of an aneurysm.  There is no evidence of acute diverticulitis the appendix appears normal.                        Preliminary result by Grzegorz Cabrera MD (03/05/23 08:06:09)                   Narrative:    START OF REPORT:  Technique: CT of the abdomen and pelvis was performed with axial images as well as sagittal and coronal reconstruction images without intravenous contrast.    Comparison: Comparison is with study dated â2023-01-30 03:39:44â.    Clinical History: CT Renal Stone Study ABD Pelvis WO Flank pain, kidney stone suspected.    Dosage Information: Automated Exposure Control was utilized 401.31 mGy.cm.    Findings:  Lines and Tubes: None.  Thorax:  Lungs: A small lung cyst is seen in the right lower lobe. The rest of the lungs are clear.  Pleura: No effusions or thickening.  Heart: Moderate cardiomegaly is seen. Pacer leads are seen in the visualized heart.  Abdomen:  Abdominal Wall: No abdominal wall pathology is seen.  Liver: The liver appears unremarkable.  Biliary System: No intrahepatic or extrahepatic biliary duct dilatation is seen.  Gallbladder: The gallbladder appears unremarkable.  Pancreas: The pancreas appears unremarkable.  Spleen: The spleen appears unremarkable.  Adrenals: The adrenal glands appear unremarkable.  Kidneys: A single cyst measuring â1.6 cmâ is seen on âImage 35, Series 2â mid pole of the left kidney, unchanged. The left kidney otherwise appears unremarkable with no stones or hydronephrosis identified. A single cyst measuring â3.4 cmâ is seen on âImage 42, Series 2â in the mid pole of the right kidney, unchanged. The right kidney otherwise appears unremarkable with no stones or hydronephrosis identified.  Aorta: There is minimal calcification of the  abdominal aorta.  IVC: Unremarkable.  Bowel:  Esophagus: The visualized esophagus appears unremarkable.  Stomach: The stomach appears unremarkable.  Duodenum: Unremarkable appearing duodenum.  Small Bowel: Surgical sutures are seen along the distal ileum. Otherwise unremarkable appearing small bowels.  Colon: Nondistended.  Appendix: The appendix appears unremarkable and is seen on âImage 68, Series 2â through âImage 59, Series 2â.  Peritoneum: No intraperitoneal free air or ascites is seen.    Pelvis:  Bladder: The bladder is nondistended but appears otherwise unremarkable.  Male:  Prostate gland: There are multiple calcifications prostate gland.    Bony structures:  Dorsal Spine: There is moderate spondylosis of the visualized dorsal spine.      Impression:  1. No acute intraabdominal or pelvic solid organ or bowel pathology identified. Details and other findings as discussed above.                          Preliminary result by Jose Cullen MD (03/05/23 08:06:09)                   Narrative:    START OF REPORT:  Technique: CT of the abdomen and pelvis was performed with axial images as well as sagittal and coronal reconstruction images without intravenous contrast.    Comparison: Comparison is with study dated â2023-01-30 03:39:44â.    Clinical History: CT Renal Stone Study ABD Pelvis WO Flank pain, kidney stone suspected.    Dosage Information: Automated Exposure Control was utilized 401.31 mGy.cm.    Findings:  Lines and Tubes: None.  Thorax:  Lungs: A small lung cyst is seen in the right lower lobe. The rest of the lungs are clear.  Pleura: No effusions or thickening.  Heart: Moderate cardiomegaly is seen. Pacer leads are seen in the visualized heart.  Abdomen:  Abdominal Wall: No abdominal wall pathology is seen.  Liver: The liver appears unremarkable.  Biliary System: No intrahepatic or extrahepatic biliary duct dilatation is seen.  Gallbladder: The gallbladder appears unremarkable.  Pancreas:  The pancreas appears unremarkable.  Spleen: The spleen appears unremarkable.  Adrenals: The adrenal glands appear unremarkable.  Kidneys: A single cyst measuring â1.6 cmâ is seen on âImage 35, Series 2â mid pole of the left kidney, unchanged. The left kidney otherwise appears unremarkable with no stones or hydronephrosis identified. A single cyst measuring â3.4 cmâ is seen on âImage 42, Series 2â in the mid pole of the right kidney, unchanged. The right kidney otherwise appears unremarkable with no stones or hydronephrosis identified.  Aorta: There is minimal calcification of the abdominal aorta.  IVC: Unremarkable.  Bowel:  Esophagus: The visualized esophagus appears unremarkable.  Stomach: The stomach appears unremarkable.  Duodenum: Unremarkable appearing duodenum.  Small Bowel: Surgical sutures are seen along the distal ileum. Otherwise unremarkable appearing small bowels.  Colon: Nondistended.  Appendix: The appendix appears unremarkable and is seen on âImage 68, Series 2â through âImage 59, Series 2â.  Peritoneum: No intraperitoneal free air or ascites is seen.    Pelvis:  Bladder: The bladder is nondistended but appears otherwise unremarkable.  Male:  Prostate gland: There are multiple calcifications prostate gland.    Bony structures:  Dorsal Spine: There is moderate spondylosis of the visualized dorsal spine.      Impression:  1. No acute intraabdominal or pelvic solid organ or bowel pathology identified. Details and other findings as discussed above.                                         Medications - No data to display  Medical Decision Making:   Initial Assessment:   Well appearing 66 yo male presents with left flank pain and nausea that began last night. CBC and CMP are grossly unremarkable. UA shows no evidence of infection. Will get CT abdomen pelvis to rule out acute pathology. He will be signed out at shift change to Dr. Sewell who will assume care and determine appropriate  disposition.  Clinical Tests:   Lab Tests: Ordered and Reviewed  Radiological Study: Ordered           ED Course as of 03/07/23 0835   Sun Mar 05, 2023   0832 Patient reassessed.  Lying comfortably.  No acute distress.  Specifically request pharmacy scripts get sent to [facility] even though pharmacy is closed today.  Will  tomorrow. [RZ]      ED Course User Index  [RZ] Humberto Sewell MD                 Clinical Impression:   Final diagnoses:  [R10.12] Left upper quadrant abdominal pain (Primary)  [R10.9] Left flank pain        ED Disposition Condition    Discharge Stable          ED Prescriptions       Medication Sig Dispense Start Date End Date Auth. Provider    sucralfate (CARAFATE) 1 gram tablet Take 1 tablet (1 g total) by mouth 2 (two) times daily. 60 tablet 3/5/2023 4/4/2023 Humberto Sewell MD    ketorolac (TORADOL) 10 mg tablet Take 1 tablet (10 mg total) by mouth every 6 (six) hours as needed for Pain. 10 tablet 3/5/2023 3/10/2023 Humberto Sewell MD          Follow-up Information       Follow up With Specialties Details Why Contact Info    [facility] - [facility] Dept Emergency Medicine  As needed, If symptoms worsen [address] [address] [phone]    Primary  Schedule an appointment as soon as possible for a visit in 3 days               Kelvin Salcido DO  03/07/23 0835

## 2023-03-10 ENCOUNTER — HOSPITAL ENCOUNTER (OUTPATIENT)
Dept: CARDIOLOGY | Facility: HOSPITAL | Age: 68
Discharge: HOME | End: 2023-03-10
Attending: STUDENT IN AN ORGANIZED HEALTH CARE EDUCATION/TRAINING PROGRAM
Payer: MEDICARE

## 2023-03-10 VITALS
SYSTOLIC BLOOD PRESSURE: 165 MMHG | WEIGHT: 240 LBS | BODY MASS INDEX: 35.55 KG/M2 | DIASTOLIC BLOOD PRESSURE: 110 MMHG | HEIGHT: 69 IN

## 2023-03-10 DIAGNOSIS — I48.11 LONGSTANDING PERSISTENT ATRIAL FIBRILLATION: ICD-10-CM

## 2023-03-10 DIAGNOSIS — I10 HYPERTENSION, UNSPECIFIED TYPE: ICD-10-CM

## 2023-03-10 DIAGNOSIS — I25.10 CORONARY ARTERY DISEASE INVOLVING NATIVE HEART WITHOUT ANGINA PECTORIS, UNSPECIFIED VESSEL OR LESION TYPE: ICD-10-CM

## 2023-03-10 LAB
AV INDEX (PROSTH): 0.63
AV MEAN GRADIENT: 5 MMHG
AV PEAK GRADIENT: 8 MMHG
AV REGURGITATION PRESSURE HALF TIME: 412.44 MS
AV VALVE AREA: 1.94 CM2
AV VELOCITY RATIO: 0.6
BSA FOR ECHO PROCEDURE: 2.3 M2
CV ECHO LV RWT: 0.67 CM
DOP CALC AO PEAK VEL: 1.39 M/S
DOP CALC AO VTI: 22.8 CM
DOP CALC LVOT AREA: 3.1 CM2
DOP CALC LVOT DIAMETER: 1.98 CM
DOP CALC LVOT PEAK VEL: 0.84 M/S
DOP CALC LVOT STROKE VOLUME: 44.32 CM3
DOP CALC MV VTI: 18.1 CM
DOP CALCLVOT PEAK VEL VTI: 14.4 CM
E WAVE DECELERATION TIME: 156.47 MSEC
E/A RATIO: 137
ECHO LV POSTERIOR WALL: 1.4 CM (ref 0.6–1.1)
EJECTION FRACTION: 50 %
FRACTIONAL SHORTENING: 41 % (ref 28–44)
HR MV ECHO: 91 BPM
INTERVENTRICULAR SEPTUM: 1.29 CM (ref 0.6–1.1)
LEFT ATRIUM SIZE: 4.62 CM
LEFT ATRIUM VOLUME INDEX MOD: 53.8 ML/M2
LEFT ATRIUM VOLUME MOD: 119.9 CM3
LEFT INTERNAL DIMENSION IN SYSTOLE: 2.46 CM (ref 2.1–4)
LEFT VENTRICLE DIASTOLIC VOLUME INDEX: 31.93 ML/M2
LEFT VENTRICLE DIASTOLIC VOLUME: 71.2 ML
LEFT VENTRICLE MASS INDEX: 93 G/M2
LEFT VENTRICLE SYSTOLIC VOLUME INDEX: 14.1 ML/M2
LEFT VENTRICLE SYSTOLIC VOLUME: 31.5 ML
LEFT VENTRICULAR INTERNAL DIMENSION IN DIASTOLE: 4.15 CM (ref 3.5–6)
LEFT VENTRICULAR MASS: 207.39 G
LVOT MG: 1.63 MMHG
LVOT MV: 0.6 CM/S
MV MEAN GRADIENT: 3 MMHG
MV PEAK A VEL: 0.01 M/S
MV PEAK E VEL: 1.37 M/S
MV PEAK GRADIENT: 7 MMHG
MV STENOSIS PRESSURE HALF TIME: 45.38 MS
MV VALVE AREA BY CONTINUITY EQUATION: 2.45 CM2
MV VALVE AREA P 1/2 METHOD: 4.85 CM2
PISA AR MAX VEL: 2.1 M/S
PISA MRMAX VEL: 5.06 M/S
PISA TR MAX VEL: 2.99 M/S
TR MAX PG: 36 MMHG
TRICUSPID ANNULAR PLANE SYSTOLIC EXCURSION: 1.63 CM

## 2023-03-10 PROCEDURE — 93306 TTE W/DOPPLER COMPLETE: CPT

## 2023-03-12 ENCOUNTER — HOSPITAL ENCOUNTER (EMERGENCY)
Facility: HOSPITAL | Age: 68
Discharge: HOME | End: 2023-03-12
Attending: FAMILY MEDICINE
Payer: MEDICARE

## 2023-03-12 VITALS
RESPIRATION RATE: 20 BRPM | WEIGHT: 240 LBS | SYSTOLIC BLOOD PRESSURE: 155 MMHG | DIASTOLIC BLOOD PRESSURE: 117 MMHG | OXYGEN SATURATION: 98 % | BODY MASS INDEX: 35.55 KG/M2 | HEART RATE: 81 BPM | HEIGHT: 69 IN | TEMPERATURE: 99 F

## 2023-03-12 DIAGNOSIS — R10.13 EPIGASTRIC ABDOMINAL PAIN: ICD-10-CM

## 2023-03-12 DIAGNOSIS — K29.70 GASTRITIS, PRESENCE OF BLEEDING UNSPECIFIED, UNSPECIFIED CHRONICITY, UNSPECIFIED GASTRITIS TYPE: ICD-10-CM

## 2023-03-12 DIAGNOSIS — R10.12 LUQ ABDOMINAL PAIN: Primary | ICD-10-CM

## 2023-03-12 DIAGNOSIS — R10.9 ABDOMINAL PAIN: ICD-10-CM

## 2023-03-12 LAB
ALBUMIN SERPL-MCNC: 3.8 G/DL (ref 3.4–4.8)
ALBUMIN/GLOB SERPL: 1.2 RATIO (ref 1.1–2)
ALP SERPL-CCNC: 70 UNIT/L (ref 40–150)
ALT SERPL-CCNC: 11 UNIT/L (ref 0–55)
AST SERPL-CCNC: 15 UNIT/L (ref 5–34)
BASOPHILS # BLD AUTO: 0.07 X10(3)/MCL (ref 0–0.2)
BASOPHILS NFR BLD AUTO: 1 %
BILIRUBIN DIRECT+TOT PNL SERPL-MCNC: 1.8 MG/DL
BUN SERPL-MCNC: 16.1 MG/DL (ref 8.4–25.7)
CALCIUM SERPL-MCNC: 9 MG/DL (ref 8.8–10)
CHLORIDE SERPL-SCNC: 105 MMOL/L (ref 98–107)
CO2 SERPL-SCNC: 24 MMOL/L (ref 23–31)
CREAT SERPL-MCNC: 1.29 MG/DL (ref 0.73–1.18)
EOSINOPHIL # BLD AUTO: 0.15 X10(3)/MCL (ref 0–0.9)
EOSINOPHIL NFR BLD AUTO: 2 %
ERYTHROCYTE [DISTWIDTH] IN BLOOD BY AUTOMATED COUNT: 14.3 % (ref 11.5–17)
GFR SERPLBLD CREATININE-BSD FMLA CKD-EPI: >60 MLS/MIN/1.73/M2
GLOBULIN SER-MCNC: 3.2 GM/DL (ref 2.4–3.5)
GLUCOSE SERPL-MCNC: 124 MG/DL (ref 82–115)
HCT VFR BLD AUTO: 39.4 % (ref 42–52)
HGB BLD-MCNC: 13.5 G/DL (ref 14–18)
IMM GRANULOCYTES # BLD AUTO: 0.05 X10(3)/MCL (ref 0–0.04)
IMM GRANULOCYTES NFR BLD AUTO: 0.7 %
LIPASE SERPL-CCNC: 23 U/L
LYMPHOCYTES # BLD AUTO: 2.34 X10(3)/MCL (ref 0.6–4.6)
LYMPHOCYTES NFR BLD AUTO: 31.9 %
MCH RBC QN AUTO: 30.5 PG
MCHC RBC AUTO-ENTMCNC: 34.3 G/DL (ref 33–36)
MCV RBC AUTO: 88.9 FL (ref 80–94)
MONOCYTES # BLD AUTO: 0.72 X10(3)/MCL (ref 0.1–1.3)
MONOCYTES NFR BLD AUTO: 9.8 %
NEUTROPHILS # BLD AUTO: 4 X10(3)/MCL (ref 2.1–9.2)
NEUTROPHILS NFR BLD AUTO: 54.6 %
NRBC BLD AUTO-RTO: 0 %
PLATELET # BLD AUTO: 208 X10(3)/MCL (ref 130–400)
PMV BLD AUTO: 9.9 FL (ref 7.4–10.4)
POTASSIUM SERPL-SCNC: 3 MMOL/L (ref 3.5–5.1)
PROT SERPL-MCNC: 7 GM/DL (ref 5.8–7.6)
RBC # BLD AUTO: 4.43 X10(6)/MCL (ref 4.7–6.1)
SODIUM SERPL-SCNC: 140 MMOL/L (ref 136–145)
TROPONIN I SERPL-MCNC: <0.01 NG/ML (ref 0–0.04)
WBC # SPEC AUTO: 7.3 X10(3)/MCL (ref 4.5–11.5)

## 2023-03-12 PROCEDURE — 96372 THER/PROPH/DIAG INJ SC/IM: CPT | Performed by: FAMILY MEDICINE

## 2023-03-12 PROCEDURE — 83690 ASSAY OF LIPASE: CPT | Performed by: FAMILY MEDICINE

## 2023-03-12 PROCEDURE — 80053 COMPREHEN METABOLIC PANEL: CPT | Performed by: FAMILY MEDICINE

## 2023-03-12 PROCEDURE — 25000003 PHARM REV CODE 250: Performed by: FAMILY MEDICINE

## 2023-03-12 PROCEDURE — 85025 COMPLETE CBC W/AUTO DIFF WBC: CPT | Performed by: FAMILY MEDICINE

## 2023-03-12 PROCEDURE — 63600175 PHARM REV CODE 636 W HCPCS: Performed by: FAMILY MEDICINE

## 2023-03-12 PROCEDURE — 96375 TX/PRO/DX INJ NEW DRUG ADDON: CPT

## 2023-03-12 PROCEDURE — 93005 ELECTROCARDIOGRAM TRACING: CPT

## 2023-03-12 PROCEDURE — 96361 HYDRATE IV INFUSION ADD-ON: CPT

## 2023-03-12 PROCEDURE — 99285 EMERGENCY DEPT VISIT HI MDM: CPT | Mod: 25

## 2023-03-12 PROCEDURE — 84484 ASSAY OF TROPONIN QUANT: CPT | Performed by: FAMILY MEDICINE

## 2023-03-12 PROCEDURE — 96374 THER/PROPH/DIAG INJ IV PUSH: CPT

## 2023-03-12 RX ORDER — ONDANSETRON 4 MG/1
4 TABLET, ORALLY DISINTEGRATING ORAL EVERY 6 HOURS PRN
Qty: 10 TABLET | Refills: 0 | Status: SHIPPED | OUTPATIENT
Start: 2023-03-12 | End: 2023-03-17

## 2023-03-12 RX ORDER — KETOROLAC TROMETHAMINE 30 MG/ML
15 INJECTION, SOLUTION INTRAMUSCULAR; INTRAVENOUS
Status: COMPLETED | OUTPATIENT
Start: 2023-03-12 | End: 2023-03-12

## 2023-03-12 RX ORDER — DICYCLOMINE HYDROCHLORIDE 10 MG/ML
20 INJECTION INTRAMUSCULAR
Status: COMPLETED | OUTPATIENT
Start: 2023-03-12 | End: 2023-03-12

## 2023-03-12 RX ORDER — MAG HYDROX/ALUMINUM HYD/SIMETH 200-200-20
30 SUSPENSION, ORAL (FINAL DOSE FORM) ORAL ONCE
Status: COMPLETED | OUTPATIENT
Start: 2023-03-12 | End: 2023-03-12

## 2023-03-12 RX ORDER — DIPHENHYDRAMINE HYDROCHLORIDE 50 MG/ML
12.5 INJECTION INTRAMUSCULAR; INTRAVENOUS
Status: COMPLETED | OUTPATIENT
Start: 2023-03-12 | End: 2023-03-12

## 2023-03-12 RX ORDER — FAMOTIDINE 20 MG/1
20 TABLET, FILM COATED ORAL 2 TIMES DAILY
Qty: 20 TABLET | Refills: 0 | OUTPATIENT
Start: 2023-03-12 | End: 2023-03-24

## 2023-03-12 RX ORDER — METOCLOPRAMIDE HYDROCHLORIDE 5 MG/ML
10 INJECTION INTRAMUSCULAR; INTRAVENOUS
Status: COMPLETED | OUTPATIENT
Start: 2023-03-12 | End: 2023-03-12

## 2023-03-12 RX ORDER — LIDOCAINE HYDROCHLORIDE 20 MG/ML
15 SOLUTION OROPHARYNGEAL ONCE
Status: COMPLETED | OUTPATIENT
Start: 2023-03-12 | End: 2023-03-12

## 2023-03-12 RX ADMIN — SODIUM CHLORIDE 1000 ML: 9 INJECTION, SOLUTION INTRAVENOUS at 05:03

## 2023-03-12 RX ADMIN — POTASSIUM BICARBONATE 40 MEQ: 391 TABLET, EFFERVESCENT ORAL at 06:03

## 2023-03-12 RX ADMIN — DIPHENHYDRAMINE HYDROCHLORIDE 12.5 MG: 50 INJECTION INTRAMUSCULAR; INTRAVENOUS at 05:03

## 2023-03-12 RX ADMIN — KETOROLAC TROMETHAMINE 15 MG: 30 INJECTION, SOLUTION INTRAMUSCULAR at 05:03

## 2023-03-12 RX ADMIN — LIDOCAINE HYDROCHLORIDE 15 ML: 20 SOLUTION ORAL; TOPICAL at 06:03

## 2023-03-12 RX ADMIN — METOCLOPRAMIDE 10 MG: 5 INJECTION, SOLUTION INTRAMUSCULAR; INTRAVENOUS at 05:03

## 2023-03-12 RX ADMIN — DICYCLOMINE HYDROCHLORIDE 20 MG: 20 INJECTION INTRAMUSCULAR at 06:03

## 2023-03-12 RX ADMIN — ALUMINUM HYDROXIDE, MAGNESIUM HYDROXIDE, AND SIMETHICONE 30 ML: 200; 200; 20 SUSPENSION ORAL at 06:03

## 2023-03-12 NOTE — ED PROVIDER NOTES
Encounter Date: 3/12/2023       History     Chief Complaint   Patient presents with    Abdominal Pain    Vomiting     Patient is a 67-year-old gentleman presenting emergency room with complaints of nausea vomiting and left upper quadrant abdominal pain.  Symptoms began this morning upon awakening.  Patient reports going to sleep in his normal state of health.  Patient does admit to eating pork chops just prior to going to bed.  Patient has a history of gastroesophageal reflux.  Denies chest pain.  Denies shortness of breath.  Denies fever chills.  Patient reports having normal bowel movements yesterday.    The history is provided by the patient.   Review of patient's allergies indicates:  No Known Allergies  Past Medical History:   Diagnosis Date    Arthritis     Atrial fibrillation     BPH (benign prostatic hyperplasia)     Cardiac arrest     Coronary artery disease     Cyst, kidney, acquired     Diverticulosis     Hyperlipidemia     Hypertension     MI (myocardial infarction)     Obesity     Steatosis of liver      Past Surgical History:   Procedure Laterality Date    A-V CARDIAC PACEMAKER INSERTION Right     CARDIAC CATHETERIZATION      COLONOSCOPY W/ BIOPSIES      excision of colon       Family History   Problem Relation Age of Onset    Hypertension Mother     Hypertension Father     Hypertension Sister      Social History     Tobacco Use    Smoking status: Former    Smokeless tobacco: Never   Substance Use Topics    Alcohol use: Not Currently    Drug use: Not Currently     Review of Systems   Constitutional:  Negative for chills, fatigue and fever.   HENT:  Negative for ear pain, rhinorrhea and sore throat.    Eyes:  Negative for photophobia and pain.   Respiratory:  Negative for cough, shortness of breath and wheezing.    Cardiovascular:  Negative for chest pain.   Gastrointestinal:  Positive for abdominal pain, nausea and vomiting. Negative for diarrhea.   Genitourinary:  Negative for dysuria.   Neurological:   Negative for dizziness, weakness and headaches.   All other systems reviewed and are negative.    Physical Exam     Initial Vitals [03/12/23 0515]   BP Pulse Resp Temp SpO2   (!) 179/127 64 20 -- 98 %      MAP       --         Physical Exam    Nursing note and vitals reviewed.  Constitutional: He appears well-developed and well-nourished.   HENT:   Head: Normocephalic and atraumatic.   Eyes: EOM are normal. Pupils are equal, round, and reactive to light.   Neck: Neck supple.   Normal range of motion.  Cardiovascular:  Normal rate, regular rhythm, normal heart sounds and intact distal pulses.     Exam reveals no gallop and no friction rub.       No murmur heard.  Pulmonary/Chest: Breath sounds normal. No respiratory distress.   Abdominal: Abdomen is soft. Bowel sounds are normal. He exhibits no distension. There is abdominal tenderness.   To palpation epigastric region and left upper quadrant.  No rebound or guarding.   Musculoskeletal:         General: Normal range of motion.      Cervical back: Normal range of motion and neck supple.     Neurological: He is alert and oriented to person, place, and time. He has normal strength.   Skin: Skin is warm and dry.   Psychiatric: He has a normal mood and affect. His behavior is normal. Judgment and thought content normal.       ED Course   Procedures  Labs Reviewed   COMPREHENSIVE METABOLIC PANEL - Abnormal; Notable for the following components:       Result Value    Potassium Level 3.0 (*)     Glucose Level 124 (*)     Creatinine 1.29 (*)     Bilirubin Total 1.8 (*)     All other components within normal limits   CBC WITH DIFFERENTIAL - Abnormal; Notable for the following components:    RBC 4.43 (*)     Hgb 13.5 (*)     Hct 39.4 (*)     IG# 0.05 (*)     All other components within normal limits   LIPASE - Normal   TROPONIN I - Normal   CBC W/ AUTO DIFFERENTIAL    Narrative:     The following orders were created for panel order CBC Auto Differential.  Procedure                                Abnormality         Status                     ---------                               -----------         ------                     CBC with Differential[[id number]]        Abnormal            Final result                 Please view results for these tests on the individual orders.   EXTRA TUBES    Narrative:     The following orders were created for panel order EXTRA TUBES.  Procedure                               Abnormality         Status                     ---------                               -----------         ------                     Light Blue Top Hold[[id number]]                              In process                 Gold Top Hold[[id number]]                                    In process                   Please view results for these tests on the individual orders.   LIGHT BLUE TOP HOLD   GOLD TOP HOLD          Imaging Results              X-Ray Abdomen Flat And Erect (Preliminary result)  Result time 03/12/23 06:29:14      Wet Read by Eduardo Hernandez MD (03/12/23 06:29:14, [facility], Emergency Medicine)    Nonspecific bowel gas pattern                                     Medications   sodium chloride 0.9% bolus 1,000 mL 1,000 mL (0 mLs Intravenous Stopped 3/12/23 0711)   metoclopramide HCl injection 10 mg (10 mg Intravenous Given 3/12/23 0550)   diphenhydrAMINE injection 12.5 mg (12.5 mg Intravenous Given 3/12/23 0550)   ketorolac injection 15 mg (15 mg Intravenous Given 3/12/23 0550)   aluminum-magnesium hydroxide-simethicone 200-200-20 mg/5 mL suspension 30 mL (30 mLs Oral Given 3/12/23 0619)     And   LIDOcaine HCl 2% oral solution 15 mL (15 mLs Oral Given 3/12/23 0619)   potassium bicarbonate disintegrating tablet 40 mEq (40 mEq Oral Given 3/12/23 0641)   dicyclomine injection 20 mg (20 mg Intramuscular Given 3/12/23 0655)     Medical Decision Making:   Initial Assessment:   Patient presents emergency room complaints of left upper quadrant  abdominal pain.  On review of medical records, multiple similar presentations in the past.  Currently nontoxic appearing.  Arrived by EMS.  Patient received 4 of Zofran via EMS.  Reports nausea slightly improved but still having abdominal pain.  Differential Diagnosis:   Gastroesophageal reflux, atypical chest pain, gastroparesis, small bowel obstruction, acute gastritis, peptic ulcer disease           ED Course as of 03/12/23 0712   Sun Mar 12, 2023   0611 WBC: 7.3 [MW]   0611 Hemoglobin(!): 13.5 [MW]   0611 Hematocrit(!): 39.4 [MW]   0611 Platelets: 208 [MW]   0627 Sodium: 140 [MW]   0627 Potassium(!): 3.0 [MW]   0627 Chloride: 105 [MW]   0627 CO2: 24 [MW]   0627 Glucose(!): 124 [MW]   0627 BUN: 16.1 [MW]   0627 Creatinine(!): 1.29 [MW]   0627 Alkaline Phosphatase: 70 [MW]   0627 ALT: 11 [MW]   0627 AST: 15 [MW]   0627 BILIRUBIN TOTAL(!): 1.8 [MW]   0627 Lipase: 23 [MW]   0629 Noted to be hypokalemic - will order potassium PO. [MW]   0645 Patient feeling improved, though still complains of mild intermittent abdominal pain. [MW]      ED Course User Index  [MW] Eduardo Hernandez MD                 Clinical Impression:   Final diagnoses:  [R10.13] Epigastric abdominal pain  [R10.9] Abdominal pain  [R10.12] LUQ abdominal pain (Primary)  [K29.70] Gastritis, presence of bleeding unspecified, unspecified chronicity, unspecified gastritis type        ED Disposition Condition    Discharge Stable          ED Prescriptions       Medication Sig Dispense Start Date End Date Auth. Provider    ondansetron (ZOFRAN-ODT) 4 MG TbDL Take 1 tablet (4 mg total) by mouth every 6 (six) hours as needed (nausea vomiting). 10 tablet 3/12/2023 3/17/2023 Eduardo Hernandez MD    famotidine (PEPCID) 20 MG tablet Take 1 tablet (20 mg total) by mouth 2 (two) times daily. for 10 days 20 tablet 3/12/2023 3/22/2023 Eduardo Hernandez MD          Follow-up Information       Follow up With Specialties Details Why Contact Info    [facility] Emergency Medicine  As needed, If symptoms worsen [address] [facility] [id number] [phone]    Primary Care Physician  In 5 days               Eduardo Hernandez MD  03/12/23 0755

## 2023-03-14 ENCOUNTER — HOSPITAL ENCOUNTER (EMERGENCY)
Facility: HOSPITAL | Age: 68
Discharge: HOME | End: 2023-03-14
Attending: EMERGENCY MEDICINE
Payer: MEDICARE

## 2023-03-14 VITALS
HEART RATE: 82 BPM | TEMPERATURE: 98 F | DIASTOLIC BLOOD PRESSURE: 98 MMHG | RESPIRATION RATE: 18 BRPM | SYSTOLIC BLOOD PRESSURE: 152 MMHG | OXYGEN SATURATION: 99 %

## 2023-03-14 DIAGNOSIS — K29.70 GASTRITIS WITHOUT BLEEDING, UNSPECIFIED CHRONICITY, UNSPECIFIED GASTRITIS TYPE: ICD-10-CM

## 2023-03-14 DIAGNOSIS — R11.2 NAUSEA AND VOMITING, UNSPECIFIED VOMITING TYPE: Primary | ICD-10-CM

## 2023-03-14 LAB
ALBUMIN SERPL-MCNC: 3.6 G/DL (ref 3.4–4.8)
ALBUMIN/GLOB SERPL: 1 RATIO (ref 1.1–2)
ALP SERPL-CCNC: 65 UNIT/L (ref 40–150)
ALT SERPL-CCNC: 12 UNIT/L (ref 0–55)
AST SERPL-CCNC: 21 UNIT/L (ref 5–34)
BASOPHILS # BLD AUTO: 0.06 X10(3)/MCL (ref 0–0.2)
BASOPHILS NFR BLD AUTO: 0.6 %
BILIRUBIN DIRECT+TOT PNL SERPL-MCNC: 1.8 MG/DL
BUN SERPL-MCNC: 15.8 MG/DL (ref 8.4–25.7)
CALCIUM SERPL-MCNC: 9.1 MG/DL (ref 8.8–10)
CHLORIDE SERPL-SCNC: 106 MMOL/L (ref 98–107)
CO2 SERPL-SCNC: 20 MMOL/L (ref 23–31)
CREAT SERPL-MCNC: 1.36 MG/DL (ref 0.73–1.18)
EOSINOPHIL # BLD AUTO: 0.13 X10(3)/MCL (ref 0–0.9)
EOSINOPHIL NFR BLD AUTO: 1.3 %
ERYTHROCYTE [DISTWIDTH] IN BLOOD BY AUTOMATED COUNT: 14.3 % (ref 11.5–17)
GFR SERPLBLD CREATININE-BSD FMLA CKD-EPI: 57 MLS/MIN/1.73/M2
GLOBULIN SER-MCNC: 3.7 GM/DL (ref 2.4–3.5)
GLUCOSE SERPL-MCNC: 132 MG/DL (ref 82–115)
HCT VFR BLD AUTO: 42.8 % (ref 42–52)
HGB BLD-MCNC: 13.9 G/DL (ref 14–18)
IMM GRANULOCYTES # BLD AUTO: 0.04 X10(3)/MCL (ref 0–0.04)
IMM GRANULOCYTES NFR BLD AUTO: 0.4 %
LIPASE SERPL-CCNC: 26 U/L
LYMPHOCYTES # BLD AUTO: 3.09 X10(3)/MCL (ref 0.6–4.6)
LYMPHOCYTES NFR BLD AUTO: 31.1 %
MAGNESIUM SERPL-MCNC: 2 MG/DL (ref 1.6–2.6)
MCH RBC QN AUTO: 29.6 PG
MCHC RBC AUTO-ENTMCNC: 32.5 G/DL (ref 33–36)
MCV RBC AUTO: 91.3 FL (ref 80–94)
MONOCYTES # BLD AUTO: 1.03 X10(3)/MCL (ref 0.1–1.3)
MONOCYTES NFR BLD AUTO: 10.4 %
NEUTROPHILS # BLD AUTO: 5.58 X10(3)/MCL (ref 2.1–9.2)
NEUTROPHILS NFR BLD AUTO: 56.2 %
NRBC BLD AUTO-RTO: 0 %
PLATELET # BLD AUTO: 198 X10(3)/MCL (ref 130–400)
PMV BLD AUTO: 10 FL (ref 7.4–10.4)
POTASSIUM SERPL-SCNC: 3.3 MMOL/L (ref 3.5–5.1)
PROT SERPL-MCNC: 7.3 GM/DL (ref 5.8–7.6)
RBC # BLD AUTO: 4.69 X10(6)/MCL (ref 4.7–6.1)
SODIUM SERPL-SCNC: 139 MMOL/L (ref 136–145)
TROPONIN I SERPL-MCNC: 0.01 NG/ML (ref 0–0.04)
WBC # SPEC AUTO: 9.9 X10(3)/MCL (ref 4.5–11.5)

## 2023-03-14 PROCEDURE — 83735 ASSAY OF MAGNESIUM: CPT | Performed by: EMERGENCY MEDICINE

## 2023-03-14 PROCEDURE — 96360 HYDRATION IV INFUSION INIT: CPT

## 2023-03-14 PROCEDURE — 84484 ASSAY OF TROPONIN QUANT: CPT | Performed by: EMERGENCY MEDICINE

## 2023-03-14 PROCEDURE — 80053 COMPREHEN METABOLIC PANEL: CPT | Performed by: EMERGENCY MEDICINE

## 2023-03-14 PROCEDURE — 25000003 PHARM REV CODE 250: Performed by: EMERGENCY MEDICINE

## 2023-03-14 PROCEDURE — 83690 ASSAY OF LIPASE: CPT | Performed by: EMERGENCY MEDICINE

## 2023-03-14 PROCEDURE — 63600175 PHARM REV CODE 636 W HCPCS: Performed by: EMERGENCY MEDICINE

## 2023-03-14 PROCEDURE — 85025 COMPLETE CBC W/AUTO DIFF WBC: CPT | Performed by: EMERGENCY MEDICINE

## 2023-03-14 PROCEDURE — 99284 EMERGENCY DEPT VISIT MOD MDM: CPT | Mod: 25

## 2023-03-14 RX ORDER — ONDANSETRON 4 MG/1
4 TABLET, ORALLY DISINTEGRATING ORAL EVERY 8 HOURS PRN
Qty: 20 TABLET | Refills: 0 | Status: SHIPPED | OUTPATIENT
Start: 2023-03-14 | End: 2023-03-14 | Stop reason: SDUPTHER

## 2023-03-14 RX ORDER — ONDANSETRON 4 MG/1
4 TABLET, ORALLY DISINTEGRATING ORAL
Status: COMPLETED | OUTPATIENT
Start: 2023-03-14 | End: 2023-03-14

## 2023-03-14 RX ORDER — ONDANSETRON 4 MG/1
4 TABLET, ORALLY DISINTEGRATING ORAL EVERY 8 HOURS PRN
Qty: 20 TABLET | Refills: 0 | OUTPATIENT
Start: 2023-03-14 | End: 2023-03-19

## 2023-03-14 RX ADMIN — ONDANSETRON 4 MG: 4 TABLET, ORALLY DISINTEGRATING ORAL at 06:03

## 2023-03-14 RX ADMIN — SODIUM CHLORIDE, POTASSIUM CHLORIDE, SODIUM LACTATE AND CALCIUM CHLORIDE 500 ML: 600; 310; 30; 20 INJECTION, SOLUTION INTRAVENOUS at 06:03

## 2023-03-14 NOTE — ED PROVIDER NOTES
Encounter Date: 3/14/2023       History     Chief Complaint   Patient presents with    Emesis     Vomiting starting tonight. Denies any other symptoms.      Mr. Delio Daniel Jr. is a 67-year-old male who presents with chief complaint nausea and vomiting.  Onset was last night after eating some sausage pizza whenever he began having nausea and vomiting that has been constant associated with epigastric and left upper quadrant abdominal pain.  Denies any hematemesis or black or bloody stools.  He has been seen numerous times for the same and states that he has not picked up the prescription medication he was recently prescribed for these symptoms.    The history is provided by the patient.   Review of patient's allergies indicates:  No Known Allergies  Past Medical History:   Diagnosis Date    Arthritis     Atrial fibrillation     BPH (benign prostatic hyperplasia)     Cardiac arrest     Coronary artery disease     Cyst, kidney, acquired     Diverticulosis     Hyperlipidemia     Hypertension     MI (myocardial infarction)     Obesity     Steatosis of liver      Past Surgical History:   Procedure Laterality Date    A-V CARDIAC PACEMAKER INSERTION Right     CARDIAC CATHETERIZATION      COLONOSCOPY W/ BIOPSIES      excision of colon       Family History   Problem Relation Age of Onset    Hypertension Mother     Hypertension Father     Hypertension Sister      Social History     Tobacco Use    Smoking status: Former    Smokeless tobacco: Never   Substance Use Topics    Alcohol use: Not Currently    Drug use: Not Currently     Review of Systems    Physical Exam     Initial Vitals [03/14/23 0428]   BP Pulse Resp Temp SpO2   (!) 179/118 100 20 98.2 °F (36.8 °C) 100 %      MAP       --         Physical Exam    Nursing note and vitals reviewed.  Constitutional: He appears well-developed and well-nourished. No distress.   HENT:   Head: Normocephalic and atraumatic.   Nose: Nose normal.   Mouth/Throat: Oropharynx is clear and  moist and mucous membranes are normal.   Eyes: Conjunctivae and EOM are normal. Pupils are equal, round, and reactive to light.   Neck: Neck supple. No tracheal deviation present.   Cardiovascular:  Normal rate, normal heart sounds, intact distal pulses and normal pulses.           Pulmonary/Chest: Effort normal and breath sounds normal. No respiratory distress.   Abdominal: Abdomen is soft. There is no abdominal tenderness. There is no rebound and no guarding.   Musculoskeletal:         General: Normal range of motion.      Cervical back: Neck supple.     Neurological: He is alert and oriented to person, place, and time. GCS score is 15.   CN II-XII intact. Moves all extremities. No gross sensory or motor deficits.   Skin: Skin is warm, dry and intact.   Psychiatric: He has a normal mood and affect. His speech is normal and behavior is normal. Judgment and thought content normal. Cognition and memory are normal.       ED Course   Procedures  Labs Reviewed   COMPREHENSIVE METABOLIC PANEL - Abnormal; Notable for the following components:       Result Value    Potassium Level 3.3 (*)     Carbon Dioxide 20 (*)     Glucose Level 132 (*)     Creatinine 1.36 (*)     Globulin 3.7 (*)     Albumin/Globulin Ratio 1.0 (*)     Bilirubin Total 1.8 (*)     All other components within normal limits   CBC WITH DIFFERENTIAL - Abnormal; Notable for the following components:    RBC 4.69 (*)     Hgb 13.9 (*)     MCHC 32.5 (*)     All other components within normal limits   LIPASE - Normal   MAGNESIUM - Normal   TROPONIN I - Normal   CBC W/ AUTO DIFFERENTIAL    Narrative:     The following orders were created for panel order CBC auto differential.  Procedure                               Abnormality         Status                     ---------                               -----------         ------                     CBC with Differential[[id number]]        Abnormal            Final result                 Please view results for these  tests on the individual orders.          Imaging Results    None          Medications   ondansetron disintegrating tablet 4 mg (4 mg Oral Given 3/14/23 0605)   lactated ringers bolus 500 mL (0 mLs Intravenous Stopped 3/14/23 0749)     Medical Decision Making:   Initial Assessment:   Well-appearing 67-year-old male who presents with epigastric and left upper quadrant abdominal pain associated with nausea and vomiting that has been occurring intermittently for a while now, has been seen numerous times for the same in our ED. He had CT scan performed at the beginning of the month which was unremarkable.  He was started on some GI medication which he states he is yet to  from the pharmacy.  CBC shows no leukocytosis or leukopenia.  CMP shows creatinine 1.36 which is up from 1.29 just 2 days ago, however his creatinine was as high as 2.10 month ago.  Given some IV hydration with 500 mL LR solution.  Nausea and vomiting resolved with Zofran.  Discussed dietary modifications to hopefully prevent recurrence of his symptoms such as avoidance of tomato based products, caffeine, and spicy foods.  We will provide referral for GI given his frequent ED visits for these symptoms.  Patient states that if I prescribe him Zofran he will take it at home and will continue taking his previously prescribed medications as directed.  I have spoken with the patient and/or caregivers. I have explained the patient's condition, diagnoses and treatment plan based on the information available to me at this time. I have answered the patient's and/or caregiver's questions and addressed any concerns. The patient and/or caregivers have as good an understanding of the patient's diagnosis, condition and treatment plan as can be expected at this point. The vital signs have been stable. The patient's condition is stable and appropriate for discharge from the emergency department.     The patient will pursue further outpatient evaluation with the  primary care physician or other designated or consulting physician as outlined in the discharge instructions. The patient and/or caregivers are agreeable to this plan of care and follow-up instructions have been explained in detail. The patient and/or caregivers have received these instructions in written format and have expressed an understanding of the discharge instructions. The patient and/or caregivers are aware that any significant change in condition or worsening of symptoms should prompt an immediate return to this or the closest emergency department or a call to 911.  Clinical Tests:   Lab Tests: Ordered and Reviewed           ED Course as of 03/15/23 0809   Tue Mar 14, 2023   0645 Nausea and vomiting resolved after zofran. Creatinine slightly increased from last visit so will give IV hydration with 500mL LR.  [IB]      ED Course User Index  [IB] Kelvin Salcido DO                 Clinical Impression:   Final diagnoses:  [R11.2] Nausea and vomiting, unspecified vomiting type (Primary)  [K29.70] Gastritis without bleeding, unspecified chronicity, unspecified gastritis type        ED Disposition Condition    Discharge Stable          ED Prescriptions       Medication Sig Dispense Start Date End Date Auth. Provider    ondansetron (ZOFRAN-ODT) 4 MG TbDL  (Status: Discontinued) Take 1 tablet (4 mg total) by mouth every 8 (eight) hours as needed (nausea/vomiting). 20 tablet 3/14/2023 3/14/2023 Kelvin Salcido DO    ondansetron (ZOFRAN-ODT) 4 MG TbDL Take 1 tablet (4 mg total) by mouth every 8 (eight) hours as needed (nausea/vomiting). 20 tablet 3/14/2023 -- Kelvin Salcido DO          Follow-up Information    None          Kelvin Salcido DO  03/15/23 0809

## 2023-03-15 ENCOUNTER — TELEPHONE (OUTPATIENT)
Dept: EMERGENCY MEDICINE | Facility: HOSPITAL | Age: 68
End: 2023-03-15
Payer: MEDICARE

## 2023-03-15 RX ORDER — PROMETHAZINE HYDROCHLORIDE 25 MG/1
25 TABLET ORAL EVERY 6 HOURS PRN
Qty: 15 TABLET | Refills: 0 | Status: ON HOLD | OUTPATIENT
Start: 2023-03-15 | End: 2023-08-16 | Stop reason: HOSPADM

## 2023-03-19 ENCOUNTER — HOSPITAL ENCOUNTER (EMERGENCY)
Facility: HOSPITAL | Age: 68
Discharge: HOME | End: 2023-03-19
Attending: EMERGENCY MEDICINE
Payer: MEDICARE

## 2023-03-19 VITALS
HEIGHT: 69 IN | DIASTOLIC BLOOD PRESSURE: 85 MMHG | HEART RATE: 70 BPM | OXYGEN SATURATION: 98 % | SYSTOLIC BLOOD PRESSURE: 125 MMHG | TEMPERATURE: 98 F | BODY MASS INDEX: 35.55 KG/M2 | WEIGHT: 240 LBS | RESPIRATION RATE: 19 BRPM

## 2023-03-19 DIAGNOSIS — R10.12 LEFT UPPER QUADRANT ABDOMINAL PAIN: Primary | ICD-10-CM

## 2023-03-19 DIAGNOSIS — R10.9 ABDOMINAL PAIN: ICD-10-CM

## 2023-03-19 LAB
ALBUMIN SERPL-MCNC: 3.6 G/DL (ref 3.4–4.8)
ALBUMIN/GLOB SERPL: 1 RATIO (ref 1.1–2)
ALP SERPL-CCNC: 61 UNIT/L (ref 40–150)
ALT SERPL-CCNC: 13 UNIT/L (ref 0–55)
APPEARANCE UR: CLEAR
AST SERPL-CCNC: 15 UNIT/L (ref 5–34)
BACTERIA #/AREA URNS AUTO: ABNORMAL /HPF
BASOPHILS # BLD AUTO: 0.09 X10(3)/MCL (ref 0–0.2)
BASOPHILS NFR BLD AUTO: 1.3 %
BILIRUB UR QL STRIP.AUTO: NEGATIVE MG/DL
BILIRUBIN DIRECT+TOT PNL SERPL-MCNC: 1 MG/DL
BUN SERPL-MCNC: 10.6 MG/DL (ref 8.4–25.7)
CALCIUM SERPL-MCNC: 9.1 MG/DL (ref 8.8–10)
CHLORIDE SERPL-SCNC: 107 MMOL/L (ref 98–107)
CO2 SERPL-SCNC: 23 MMOL/L (ref 23–31)
COLOR UR AUTO: ABNORMAL
CREAT SERPL-MCNC: 0.9 MG/DL (ref 0.73–1.18)
EOSINOPHIL # BLD AUTO: 0.17 X10(3)/MCL (ref 0–0.9)
EOSINOPHIL NFR BLD AUTO: 2.5 %
ERYTHROCYTE [DISTWIDTH] IN BLOOD BY AUTOMATED COUNT: 13.8 % (ref 11.5–17)
GFR SERPLBLD CREATININE-BSD FMLA CKD-EPI: >60 MLS/MIN/1.73/M2
GLOBULIN SER-MCNC: 3.7 GM/DL (ref 2.4–3.5)
GLUCOSE SERPL-MCNC: 96 MG/DL (ref 82–115)
GLUCOSE UR QL STRIP.AUTO: NORMAL MG/DL
HCT VFR BLD AUTO: 41.7 % (ref 42–52)
HGB BLD-MCNC: 14.1 G/DL (ref 14–18)
HYALINE CASTS #/AREA URNS LPF: ABNORMAL /LPF
IMM GRANULOCYTES # BLD AUTO: 0.05 X10(3)/MCL (ref 0–0.04)
IMM GRANULOCYTES NFR BLD AUTO: 0.7 %
KETONES UR QL STRIP.AUTO: NEGATIVE MG/DL
LEUKOCYTE ESTERASE UR QL STRIP.AUTO: NEGATIVE UNIT/L
LIPASE SERPL-CCNC: 25 U/L
LYMPHOCYTES # BLD AUTO: 2.2 X10(3)/MCL (ref 0.6–4.6)
LYMPHOCYTES NFR BLD AUTO: 32.5 %
MCH RBC QN AUTO: 30 PG
MCHC RBC AUTO-ENTMCNC: 33.8 G/DL (ref 33–36)
MCV RBC AUTO: 88.7 FL (ref 80–94)
MONOCYTES # BLD AUTO: 0.79 X10(3)/MCL (ref 0.1–1.3)
MONOCYTES NFR BLD AUTO: 11.7 %
MUCOUS THREADS URNS QL MICRO: ABNORMAL /LPF
NEUTROPHILS # BLD AUTO: 3.46 X10(3)/MCL (ref 2.1–9.2)
NEUTROPHILS NFR BLD AUTO: 51.3 %
NITRITE UR QL STRIP.AUTO: NEGATIVE
NRBC BLD AUTO-RTO: 0 %
PH UR STRIP.AUTO: 7 [PH]
PLATELET # BLD AUTO: 253 X10(3)/MCL (ref 130–400)
PMV BLD AUTO: 9.8 FL (ref 7.4–10.4)
POTASSIUM SERPL-SCNC: 3.5 MMOL/L (ref 3.5–5.1)
PROT SERPL-MCNC: 7.3 GM/DL (ref 5.8–7.6)
PROT UR QL STRIP.AUTO: ABNORMAL MG/DL
RBC # BLD AUTO: 4.7 X10(6)/MCL (ref 4.7–6.1)
RBC #/AREA URNS AUTO: ABNORMAL /HPF
RBC UR QL AUTO: NEGATIVE UNIT/L
SODIUM SERPL-SCNC: 140 MMOL/L (ref 136–145)
SP GR UR STRIP.AUTO: 1.02
SQUAMOUS #/AREA URNS LPF: ABNORMAL /HPF
TROPONIN I SERPL-MCNC: <0.01 NG/ML (ref 0–0.04)
UROBILINOGEN UR STRIP-ACNC: NORMAL MG/DL
WBC # SPEC AUTO: 6.8 X10(3)/MCL (ref 4.5–11.5)
WBC #/AREA URNS AUTO: ABNORMAL /HPF

## 2023-03-19 PROCEDURE — 96361 HYDRATE IV INFUSION ADD-ON: CPT

## 2023-03-19 PROCEDURE — 85025 COMPLETE CBC W/AUTO DIFF WBC: CPT | Performed by: EMERGENCY MEDICINE

## 2023-03-19 PROCEDURE — 83690 ASSAY OF LIPASE: CPT | Performed by: EMERGENCY MEDICINE

## 2023-03-19 PROCEDURE — 81001 URINALYSIS AUTO W/SCOPE: CPT | Performed by: EMERGENCY MEDICINE

## 2023-03-19 PROCEDURE — 25000003 PHARM REV CODE 250: Performed by: EMERGENCY MEDICINE

## 2023-03-19 PROCEDURE — 93005 ELECTROCARDIOGRAM TRACING: CPT

## 2023-03-19 PROCEDURE — 80053 COMPREHEN METABOLIC PANEL: CPT | Performed by: EMERGENCY MEDICINE

## 2023-03-19 PROCEDURE — 63600175 PHARM REV CODE 636 W HCPCS: Performed by: EMERGENCY MEDICINE

## 2023-03-19 PROCEDURE — 99284 EMERGENCY DEPT VISIT MOD MDM: CPT | Mod: 25

## 2023-03-19 PROCEDURE — 84484 ASSAY OF TROPONIN QUANT: CPT | Performed by: EMERGENCY MEDICINE

## 2023-03-19 PROCEDURE — 96374 THER/PROPH/DIAG INJ IV PUSH: CPT

## 2023-03-19 RX ORDER — MAG HYDROX/ALUMINUM HYD/SIMETH 200-200-20
30 SUSPENSION, ORAL (FINAL DOSE FORM) ORAL ONCE
Status: COMPLETED | OUTPATIENT
Start: 2023-03-19 | End: 2023-03-19

## 2023-03-19 RX ORDER — ONDANSETRON 4 MG/1
4 TABLET, ORALLY DISINTEGRATING ORAL EVERY 12 HOURS PRN
Qty: 12 TABLET | Refills: 1 | Status: SHIPPED | OUTPATIENT
Start: 2023-03-19 | End: 2023-03-24 | Stop reason: SDUPTHER

## 2023-03-19 RX ORDER — ONDANSETRON 2 MG/ML
4 INJECTION INTRAMUSCULAR; INTRAVENOUS
Status: COMPLETED | OUTPATIENT
Start: 2023-03-19 | End: 2023-03-19

## 2023-03-19 RX ORDER — LIDOCAINE HYDROCHLORIDE 20 MG/ML
15 SOLUTION OROPHARYNGEAL ONCE
Status: COMPLETED | OUTPATIENT
Start: 2023-03-19 | End: 2023-03-19

## 2023-03-19 RX ADMIN — ONDANSETRON 4 MG: 2 INJECTION INTRAMUSCULAR; INTRAVENOUS at 07:03

## 2023-03-19 RX ADMIN — LIDOCAINE HYDROCHLORIDE 15 ML: 20 SOLUTION ORAL; TOPICAL at 07:03

## 2023-03-19 RX ADMIN — SODIUM CHLORIDE, POTASSIUM CHLORIDE, SODIUM LACTATE AND CALCIUM CHLORIDE 500 ML: 600; 310; 30; 20 INJECTION, SOLUTION INTRAVENOUS at 07:03

## 2023-03-19 RX ADMIN — ALUMINUM HYDROXIDE, MAGNESIUM HYDROXIDE, AND SIMETHICONE 30 ML: 200; 200; 20 SUSPENSION ORAL at 07:03

## 2023-03-19 NOTE — ED PROVIDER NOTES
Encounter Date: 3/19/2023       History     Chief Complaint   Patient presents with    Abdominal Pain    Back Pain     States abdominal pain and back pain since 0300 this am.  Denies all other GI/ symptoms.       He returns again by ambulance under similar circumstances, previous visits reviewed.  He has not yet followed with gastroenterology.  Onset left upper quadrant abdominal discomfort again about 3:00 a.m. this morning, chronic back pain without recent change.  Nausea last week but not at present.  He reports that medications given in the ER seemed to help.  It is unclear he is taking medications at home for this problem.  Denies fever, urinary complaints, or other specific problems.  Unclear why he has not yet been able to have outpatient GI evaluation.    The history is provided by the patient and the EMS personnel. No  was used.   Review of patient's allergies indicates:  No Known Allergies  Past Medical History:   Diagnosis Date    Arthritis     Atrial fibrillation     BPH (benign prostatic hyperplasia)     Cardiac arrest     Coronary artery disease     Cyst, kidney, acquired     Diverticulosis     Hyperlipidemia     Hypertension     MI (myocardial infarction)     Obesity     Steatosis of liver      Past Surgical History:   Procedure Laterality Date    A-V CARDIAC PACEMAKER INSERTION Right     CARDIAC CATHETERIZATION      COLONOSCOPY W/ BIOPSIES      excision of colon       Family History   Problem Relation Age of Onset    Hypertension Mother     Hypertension Father     Hypertension Sister      Social History     Tobacco Use    Smoking status: Former    Smokeless tobacco: Never   Substance Use Topics    Alcohol use: Not Currently    Drug use: Not Currently     Review of Systems   Constitutional:  Negative for chills and fever.   HENT:  Negative for congestion, facial swelling, nosebleeds and sinus pressure.    Eyes:  Negative for pain and redness.   Respiratory:  Negative for chest  tightness, shortness of breath and wheezing.    Cardiovascular:  Negative for chest pain, palpitations and leg swelling.   Gastrointestinal:  Positive for abdominal pain. Negative for abdominal distention, diarrhea, nausea and vomiting.   Endocrine: Negative for cold intolerance, polydipsia and polyphagia.   Genitourinary:  Negative for difficulty urinating, dysuria, frequency and hematuria.   Musculoskeletal:  Positive for back pain. Negative for arthralgias, myalgias and neck pain.   Skin:  Negative for color change and rash.   Neurological:  Negative for dizziness, weakness, numbness and headaches.   Hematological:  Negative for adenopathy. Does not bruise/bleed easily.   Psychiatric/Behavioral:  Negative for agitation and behavioral problems.    All other systems reviewed and are negative.    Physical Exam     Initial Vitals [03/19/23 0643]   BP Pulse Resp Temp SpO2   (!) 138/92 90 18 97.5 °F (36.4 °C) 98 %      MAP       --         Physical Exam    Nursing note and vitals reviewed.  Constitutional: He appears well-developed and well-nourished. He is not diaphoretic. No distress.   Obese   HENT:   Head: Normocephalic and atraumatic.   Mouth/Throat: Oropharynx is clear and moist. No oropharyngeal exudate.   Eyes: Conjunctivae and EOM are normal. Pupils are equal, round, and reactive to light. Right eye exhibits no discharge. Left eye exhibits no discharge. No scleral icterus.   Neck: Neck supple. No thyromegaly present. No tracheal deviation present. No JVD present.   Normal range of motion.  Cardiovascular:  Normal rate and normal heart sounds.     Exam reveals no gallop and no friction rub.       No murmur heard.  Irregular rate   Pulmonary/Chest: Breath sounds normal. No respiratory distress. He has no wheezes. He has no rhonchi. He has no rales. He exhibits no tenderness.   Abdominal: Abdomen is soft. Bowel sounds are normal. He exhibits no distension and no mass. There is no abdominal tenderness.   Obese,  nontender, indicates left upper quadrant as the location of pain but no convincing abnormalities.  No rebound or guarding. There is no rebound and no guarding.   Musculoskeletal:         General: No tenderness or edema. Normal range of motion.      Cervical back: Normal range of motion and neck supple.     Lymphadenopathy:     He has no cervical adenopathy.   Neurological: He is alert and oriented to person, place, and time. He has normal strength. No cranial nerve deficit.   Skin: Skin is warm and dry. No rash noted. No erythema.   Psychiatric: He has a normal mood and affect. His behavior is normal. Judgment and thought content normal.       ED Course   Procedures  Labs Reviewed   COMPREHENSIVE METABOLIC PANEL - Abnormal; Notable for the following components:       Result Value    Globulin 3.7 (*)     Albumin/Globulin Ratio 1.0 (*)     All other components within normal limits   URINALYSIS, REFLEX TO URINE CULTURE - Abnormal; Notable for the following components:    Protein, UA 1+ (*)     Squamous Epithelial Cells, UA Trace (*)     Mucous, UA Trace (*)     All other components within normal limits   CBC WITH DIFFERENTIAL - Abnormal; Notable for the following components:    Hct 41.7 (*)     IG# 0.05 (*)     All other components within normal limits   LIPASE - Normal   TROPONIN I - Normal   CBC W/ AUTO DIFFERENTIAL    Narrative:     The following orders were created for panel order CBC W/ AUTO DIFFERENTIAL.  Procedure                               Abnormality         Status                     ---------                               -----------         ------                     CBC with Differential[[id number]]        Abnormal            Final result                 Please view results for these tests on the individual orders.     EKG Readings: (Independently Interpreted)   Initial Reading: No STEMI. Rhythm: Atrial Fibrillation. Heart Rate: 76.   Atrial fibrillation with occasional paced ventricular beats, old  anterior infarction, inferior and lateral ST abnormalities possibly ischemic.  No significant change from previous EKG.   ECG Results              EKG 12-lead (In process)  Result time 03/19/23 07:36:32      In process by Interface, Lab In [facility] (03/19/23 07:36:32)                   Narrative:    Test Reason : R10.9,    Vent. Rate : 076 BPM     Atrial Rate : 065 BPM     P-R Int : 000 ms          QRS Dur : 088 ms      QT Int : 416 ms       P-R-T Axes : 000 084 261 degrees     QTc Int : 468 ms     Suspect unspecified pacemaker failure  Atrial fibrillation with occasional ventricular-paced complexes  Anterior infarct ,age undetermined  ST and T wave abnormality, consider inferolateral ischemia  Abnormal ECG  When compared with ECG of 12-MAR-2023 06:10,  Vent. rate has increased BY   6 BPM    Referred By: [email or web address]   SELF           Confirmed By:                                   Imaging Results    None          Medications   ondansetron injection 4 mg (4 mg Intravenous Given 3/19/23 0752)   aluminum-magnesium hydroxide-simethicone 200-200-20 mg/5 mL suspension 30 mL (30 mLs Oral Given 3/19/23 0752)     And   LIDOcaine HCl 2% oral solution 15 mL (15 mLs Oral Given 3/19/23 0752)   lactated ringers bolus 500 mL (0 mLs Intravenous Stopped 3/19/23 0837)         8:56 AM Complete relief of symptoms, benign abdominal exam.  Counseled again regarding recurring abdominal pain of uncertain etiology.  Outpatient attempts to see GI have not yet been successful, will try again.  Will refill Zofran, try also home GI cocktail for relief of intermittent upper abdominal discomfort of uncertain etiology.  Stable for discharge and home management.      Medical Decision Making  Problems Addressed:  Left upper quadrant abdominal pain: chronic illness or injury    Amount and/or Complexity of Data Reviewed  External Data Reviewed: labs, radiology, ECG and notes.  Labs: ordered. Decision-making details documented in ED Course.  ECG/medicine  tests: ordered and independent interpretation performed. Decision-making details documented in ED Course.    Risk  Prescription drug management.  Decision regarding hospitalization.                                   Clinical Impression:   Final diagnoses:  [R10.9] Abdominal pain  [R10.12] Left upper quadrant abdominal pain (Primary)        ED Disposition Condition    Discharge Stable          ED Prescriptions       Medication Sig Dispense Start Date End Date Auth. Provider    ondansetron (ZOFRAN-ODT) 4 MG TbDL Take 1 tablet (4 mg total) by mouth every 12 (twelve) hours as needed (n/v). 12 tablet 3/19/2023 -- Tani Neff MD    GI cocktail antac/dicyc/lidoc Swish and swallow 30 mLs every 6 (six) hours as needed (abdominal pain). 450 mL 3/19/2023 -- Tani Neff MD          Follow-up Information       Follow up With Specialties Details Why Contact Info    [facility] Emergency Dept Emergency Medicine  As needed [address] [facility] [address] [phone]             Tani Neff MD  03/19/23 0853

## 2023-03-19 NOTE — DISCHARGE INSTRUCTIONS
Evaluation again is normal.      I have put in another referral to our [facility]astroenterology Clinic, hopefully they will call you soon for an appointment.      Use the Zofran under the tongue as labeled as needed for nausea, try also the home liquid GI cocktail as labeled as needed for abdominal pain, keep the bottle in the refrigerator.      Return to the ER if worse or new symptoms develop.

## 2023-03-23 ENCOUNTER — TELEPHONE (OUTPATIENT)
Dept: CARDIOLOGY | Facility: CLINIC | Age: 68
End: 2023-03-23
Payer: MEDICARE

## 2023-03-23 NOTE — TELEPHONE ENCOUNTER
----- Message from aMrylu Brown DO sent at 3/23/2023  9:14 AM CDT -----  I reviewed this echo with Dr. Villegas. He will need a repeat in September. I have placed the order. With it being that far out, will he need to schedule it? Or will they call and schedule it for him?     His LDL was also very low and he will need a repeat FLP prior to his 5/2 appointment so they can determine if his Lipitor dose needs to be reduced.     ----- Message -----  From: Rico Celis MD  Sent: 3/10/2023   5:00 PM CDT  To: Marylu Brown DO    ____________________________________________  TL 03- 11:00am    Notified pt that he will need fasting labs before next appt in May 2023 and another echocardiogram in September.  Attempted to schedule Echo for September 2023 but they cannot schedule past July at this time. Scheduling states they put in a note to call pt to schedule once spots are opened up.

## 2023-03-24 ENCOUNTER — HOSPITAL ENCOUNTER (EMERGENCY)
Facility: HOSPITAL | Age: 68
Discharge: HOME | End: 2023-03-24
Attending: STUDENT IN AN ORGANIZED HEALTH CARE EDUCATION/TRAINING PROGRAM
Payer: MEDICARE

## 2023-03-24 ENCOUNTER — HOSPITAL ENCOUNTER (EMERGENCY)
Facility: HOSPITAL | Age: 68
Discharge: HOME | End: 2023-03-24
Attending: FAMILY MEDICINE
Payer: MEDICARE

## 2023-03-24 VITALS
OXYGEN SATURATION: 100 % | DIASTOLIC BLOOD PRESSURE: 86 MMHG | HEART RATE: 80 BPM | HEIGHT: 69 IN | SYSTOLIC BLOOD PRESSURE: 120 MMHG | WEIGHT: 229.25 LBS | BODY MASS INDEX: 33.96 KG/M2 | RESPIRATION RATE: 16 BRPM | TEMPERATURE: 97 F

## 2023-03-24 VITALS
SYSTOLIC BLOOD PRESSURE: 132 MMHG | HEART RATE: 76 BPM | WEIGHT: 240.31 LBS | OXYGEN SATURATION: 99 % | HEIGHT: 69 IN | RESPIRATION RATE: 18 BRPM | DIASTOLIC BLOOD PRESSURE: 106 MMHG | TEMPERATURE: 98 F | BODY MASS INDEX: 35.59 KG/M2

## 2023-03-24 DIAGNOSIS — N30.90 CYSTITIS: Primary | ICD-10-CM

## 2023-03-24 DIAGNOSIS — M54.50 CHRONIC RIGHT-SIDED LOW BACK PAIN WITHOUT SCIATICA: Primary | ICD-10-CM

## 2023-03-24 DIAGNOSIS — R10.9 ABDOMINAL PAIN: ICD-10-CM

## 2023-03-24 DIAGNOSIS — R11.2 NAUSEA & VOMITING: ICD-10-CM

## 2023-03-24 DIAGNOSIS — G89.29 CHRONIC RIGHT-SIDED LOW BACK PAIN WITHOUT SCIATICA: Primary | ICD-10-CM

## 2023-03-24 LAB
ALBUMIN SERPL-MCNC: 3.9 G/DL (ref 3.4–4.8)
ALBUMIN/GLOB SERPL: 1.1 RATIO (ref 1.1–2)
ALP SERPL-CCNC: 70 UNIT/L (ref 40–150)
ALT SERPL-CCNC: 29 UNIT/L (ref 0–55)
APPEARANCE UR: ABNORMAL
AST SERPL-CCNC: 47 UNIT/L (ref 5–34)
BACTERIA #/AREA URNS AUTO: ABNORMAL /HPF
BASOPHILS # BLD AUTO: 0.07 X10(3)/MCL (ref 0–0.2)
BASOPHILS NFR BLD AUTO: 1 %
BILIRUB UR QL STRIP.AUTO: NEGATIVE MG/DL
BILIRUBIN DIRECT+TOT PNL SERPL-MCNC: 1.3 MG/DL
BUN SERPL-MCNC: 18.7 MG/DL (ref 8.4–25.7)
CALCIUM SERPL-MCNC: 9.3 MG/DL (ref 8.8–10)
CHLORIDE SERPL-SCNC: 107 MMOL/L (ref 98–107)
CK MB SERPL-MCNC: 1.9 NG/ML
CK SERPL-CCNC: 157 U/L (ref 30–200)
CO2 SERPL-SCNC: 23 MMOL/L (ref 23–31)
COLOR UR AUTO: YELLOW
CREAT SERPL-MCNC: 1.7 MG/DL (ref 0.73–1.18)
EOSINOPHIL # BLD AUTO: 0.13 X10(3)/MCL (ref 0–0.9)
EOSINOPHIL NFR BLD AUTO: 1.9 %
ERYTHROCYTE [DISTWIDTH] IN BLOOD BY AUTOMATED COUNT: 13.8 % (ref 11.5–17)
GFR SERPLBLD CREATININE-BSD FMLA CKD-EPI: 44 MLS/MIN/1.73/M2
GLOBULIN SER-MCNC: 3.6 GM/DL (ref 2.4–3.5)
GLUCOSE SERPL-MCNC: 108 MG/DL (ref 82–115)
GLUCOSE UR QL STRIP.AUTO: NORMAL MG/DL
HCT VFR BLD AUTO: 42.4 % (ref 42–52)
HGB BLD-MCNC: 14.2 G/DL (ref 14–18)
HYALINE CASTS #/AREA URNS LPF: ABNORMAL /LPF
IMM GRANULOCYTES # BLD AUTO: 0.04 X10(3)/MCL (ref 0–0.04)
IMM GRANULOCYTES NFR BLD AUTO: 0.6 %
KETONES UR QL STRIP.AUTO: NEGATIVE MG/DL
LEUKOCYTE ESTERASE UR QL STRIP.AUTO: 500 UNIT/L
LIPASE SERPL-CCNC: 42 U/L
LYMPHOCYTES # BLD AUTO: 2.33 X10(3)/MCL (ref 0.6–4.6)
LYMPHOCYTES NFR BLD AUTO: 34.5 %
MCH RBC QN AUTO: 29.8 PG (ref 27–31)
MCHC RBC AUTO-ENTMCNC: 33.5 G/DL (ref 33–36)
MCV RBC AUTO: 89.1 FL (ref 80–94)
MONOCYTES # BLD AUTO: 0.6 X10(3)/MCL (ref 0.1–1.3)
MONOCYTES NFR BLD AUTO: 8.9 %
MUCOUS THREADS URNS QL MICRO: ABNORMAL /LPF
NEUTROPHILS # BLD AUTO: 3.58 X10(3)/MCL (ref 2.1–9.2)
NEUTROPHILS NFR BLD AUTO: 53.1 %
NITRITE UR QL STRIP.AUTO: NEGATIVE
NRBC BLD AUTO-RTO: 0 %
PH UR STRIP.AUTO: 5.5 [PH]
PLATELET # BLD AUTO: 273 X10(3)/MCL (ref 130–400)
PMV BLD AUTO: 9.7 FL (ref 7.4–10.4)
POTASSIUM SERPL-SCNC: 3.4 MMOL/L (ref 3.5–5.1)
PROT SERPL-MCNC: 7.5 GM/DL (ref 5.8–7.6)
PROT UR QL STRIP.AUTO: ABNORMAL MG/DL
RBC # BLD AUTO: 4.76 X10(6)/MCL (ref 4.7–6.1)
RBC #/AREA URNS AUTO: ABNORMAL /HPF
RBC UR QL AUTO: ABNORMAL UNIT/L
SODIUM SERPL-SCNC: 140 MMOL/L (ref 136–145)
SP GR UR STRIP.AUTO: 1.02
SQUAMOUS #/AREA URNS LPF: ABNORMAL /HPF
TROPONIN I SERPL-MCNC: <0.01 NG/ML (ref 0–0.04)
UROBILINOGEN UR STRIP-ACNC: ABNORMAL MG/DL
WBC # SPEC AUTO: 6.8 X10(3)/MCL (ref 4.5–11.5)
WBC #/AREA URNS AUTO: ABNORMAL /HPF
WBC CLUMPS UR QL AUTO: ABNORMAL /HPF

## 2023-03-24 PROCEDURE — 25000003 PHARM REV CODE 250: Performed by: FAMILY MEDICINE

## 2023-03-24 PROCEDURE — 99284 EMERGENCY DEPT VISIT MOD MDM: CPT | Mod: 25,27

## 2023-03-24 PROCEDURE — 25000003 PHARM REV CODE 250: Performed by: NURSE PRACTITIONER

## 2023-03-24 PROCEDURE — 84484 ASSAY OF TROPONIN QUANT: CPT | Performed by: NURSE PRACTITIONER

## 2023-03-24 PROCEDURE — 83690 ASSAY OF LIPASE: CPT | Performed by: NURSE PRACTITIONER

## 2023-03-24 PROCEDURE — 82550 ASSAY OF CK (CPK): CPT | Performed by: NURSE PRACTITIONER

## 2023-03-24 PROCEDURE — 99285 EMERGENCY DEPT VISIT HI MDM: CPT

## 2023-03-24 PROCEDURE — 85025 COMPLETE CBC W/AUTO DIFF WBC: CPT | Performed by: NURSE PRACTITIONER

## 2023-03-24 PROCEDURE — 80053 COMPREHEN METABOLIC PANEL: CPT | Performed by: NURSE PRACTITIONER

## 2023-03-24 PROCEDURE — 81001 URINALYSIS AUTO W/SCOPE: CPT | Performed by: NURSE PRACTITIONER

## 2023-03-24 PROCEDURE — 93005 ELECTROCARDIOGRAM TRACING: CPT

## 2023-03-24 PROCEDURE — 87088 URINE BACTERIA CULTURE: CPT | Performed by: NURSE PRACTITIONER

## 2023-03-24 PROCEDURE — 82553 CREATINE MB FRACTION: CPT | Performed by: NURSE PRACTITIONER

## 2023-03-24 RX ORDER — IBUPROFEN 600 MG/1
600 TABLET ORAL
Status: COMPLETED | OUTPATIENT
Start: 2023-03-24 | End: 2023-03-24

## 2023-03-24 RX ORDER — LIDOCAINE 50 MG/G
1 PATCH TOPICAL DAILY
Qty: 15 PATCH | Refills: 0 | Status: ON HOLD | OUTPATIENT
Start: 2023-03-24 | End: 2023-08-28 | Stop reason: HOSPADM

## 2023-03-24 RX ORDER — LIDOCAINE HYDROCHLORIDE 20 MG/ML
15 SOLUTION OROPHARYNGEAL ONCE
Status: COMPLETED | OUTPATIENT
Start: 2023-03-24 | End: 2023-03-24

## 2023-03-24 RX ORDER — MAG HYDROX/ALUMINUM HYD/SIMETH 200-200-20
30 SUSPENSION, ORAL (FINAL DOSE FORM) ORAL ONCE
Status: COMPLETED | OUTPATIENT
Start: 2023-03-24 | End: 2023-03-24

## 2023-03-24 RX ORDER — CIPROFLOXACIN 500 MG/1
500 TABLET ORAL 2 TIMES DAILY
Qty: 20 TABLET | Refills: 0 | Status: SHIPPED | OUTPATIENT
Start: 2023-03-24 | End: 2023-04-03

## 2023-03-24 RX ORDER — LIDOCAINE 50 MG/G
1 PATCH TOPICAL
Status: DISCONTINUED | OUTPATIENT
Start: 2023-03-24 | End: 2023-03-24 | Stop reason: HOSPADM

## 2023-03-24 RX ORDER — FAMOTIDINE 20 MG/1
20 TABLET, FILM COATED ORAL 2 TIMES DAILY
Qty: 20 TABLET | Refills: 0 | Status: SHIPPED | OUTPATIENT
Start: 2023-03-24 | End: 2023-03-24 | Stop reason: SDUPTHER

## 2023-03-24 RX ORDER — CYCLOBENZAPRINE HCL 10 MG
10 TABLET ORAL 3 TIMES DAILY PRN
Qty: 15 TABLET | Refills: 0 | Status: SHIPPED | OUTPATIENT
Start: 2023-03-24 | End: 2023-03-29

## 2023-03-24 RX ORDER — ONDANSETRON 4 MG/1
4 TABLET, ORALLY DISINTEGRATING ORAL EVERY 12 HOURS PRN
Qty: 6 TABLET | Refills: 0 | Status: SHIPPED | OUTPATIENT
Start: 2023-03-24 | End: 2023-03-27

## 2023-03-24 RX ORDER — FAMOTIDINE 20 MG/1
20 TABLET, FILM COATED ORAL 2 TIMES DAILY
Qty: 20 TABLET | Refills: 0 | Status: SHIPPED | OUTPATIENT
Start: 2023-03-24 | End: 2023-09-18 | Stop reason: ALTCHOICE

## 2023-03-24 RX ORDER — IBUPROFEN 600 MG/1
600 TABLET ORAL EVERY 6 HOURS PRN
Qty: 30 TABLET | Refills: 0 | OUTPATIENT
Start: 2023-03-24 | End: 2023-05-21

## 2023-03-24 RX ADMIN — IBUPROFEN 600 MG: 600 TABLET, FILM COATED ORAL at 08:03

## 2023-03-24 RX ADMIN — ALUMINUM HYDROXIDE, MAGNESIUM HYDROXIDE, AND SIMETHICONE 30 ML: 200; 200; 20 SUSPENSION ORAL at 08:03

## 2023-03-24 RX ADMIN — LIDOCAINE 1 PATCH: 50 PATCH TOPICAL at 08:03

## 2023-03-24 RX ADMIN — LIDOCAINE HYDROCHLORIDE 15 ML: 20 SOLUTION ORAL; TOPICAL at 08:03

## 2023-03-24 NOTE — DISCHARGE INSTRUCTIONS
You came into emergency department with back pain.      You can use the following to help you with your pain:  1. Will receive a prescription for Flexeril.  This is a muscle relaxant.  Please do not drive or operate any type machinery because it makes people sleepy.  2. You can use ibuprofen 600 mg every 6 hours scheduled for pain control.  If you continue to have pain in between ibuprofen you can take Tylenol.    3. You can use lidocaine patch if you feel like it is helpful.  You can  the over-the-counter medication or use the prescription.  4. Things like warm bath with Epsom salt, hearting pack on low heat for 20 minutes, gentle stretching, or massages are also helpful.    Please follow-up with your primary care doctor for re-evaluation as needed.      Return to the emergency department if you have worsening back pain that is not under control with medication, not able to walk like normal, have difficulty urinating or having bowel movements, have numbness or tingling in your groin area.

## 2023-03-24 NOTE — ED PROVIDER NOTES
Name: Delio Daniel Jr.   Age: 67 y.o.  Sex: male    Chief complaint:   Chief Complaint   Patient presents with    Back Pain     Co lower back pain since this am.  Denies injury.       Patient arrived with: Private  History obtained from: Patient    Subjective:   67-year-old male with a history of Afib (currently on Xarelto ), CAD, hypertension, hyperlipidemia, BPH, chronic lower back pain that presents to emergency department for lower back pain.  Patient said he has intermittent flares of his back pain.  He woke up this morning and started to have some back pain while moving around.  Pain is in the right lower paraspinal area.  It is sharp, constant, nonradiating.  Denies saddle anesthesia, urinary bowel incontinence, numbness, tingling, weakness in extremities.  Patient denies any falls or trauma.  Denies fever, chills, sweats, cough, sore throat, chest pain, shortness of breath, abdominal pain, nausea, vomiting, diarrhea, dysuria, hematuria.    Past Medical History:   Diagnosis Date    Arthritis     Atrial fibrillation     BPH (benign prostatic hyperplasia)     Cardiac arrest     Coronary artery disease     Cyst, kidney, acquired     Diverticulosis     Hyperlipidemia     Hypertension     MI (myocardial infarction)     Obesity     Steatosis of liver      Past Surgical History:   Procedure Laterality Date    A-V CARDIAC PACEMAKER INSERTION Right     CARDIAC CATHETERIZATION      COLONOSCOPY W/ BIOPSIES      excision of colon       Social History     Socioeconomic History    Marital status:    Tobacco Use    Smoking status: Former    Smokeless tobacco: Never   Substance and Sexual Activity    Alcohol use: Not Currently    Drug use: Not Currently    Sexual activity: Not Currently     Social Determinants of Health     Financial Resource Strain: Low Risk     Difficulty of Paying Living Expenses: Not hard at all   Food Insecurity: No Food Insecurity    Worried About Running Out of Food in the Last Year: Never  true    Ran Out of Food in the Last Year: Never true   Transportation Needs: No Transportation Needs    Lack of Transportation (Medical): No    Lack of Transportation (Non-Medical): No   Physical Activity: Sufficiently Active    Days of Exercise per Week: 6 days    Minutes of Exercise per Session: 60 min   Stress: No Stress Concern Present    Feeling of Stress : Not at all   Social Connections: Unknown    Frequency of Communication with Friends and Family: More than three times a week    Frequency of Social Gatherings with Friends and Family: More than three times a week    Attends Orthodoxy Services: More than 4 times per year    Active Member of Clubs or Organizations: No    Attends Club or Organization Meetings: Never   Housing Stability: Low Risk     Unable to Pay for Housing in the Last Year: No    Number of Places Lived in the Last Year: 1    Unstable Housing in the Last Year: No     Review of patient's allergies indicates:  No Known Allergies     Review of Systems   Constitutional:  Negative for diaphoresis and fever.   HENT:  Negative for congestion and sore throat.    Eyes:  Negative for pain and discharge.   Respiratory:  Negative for cough and shortness of breath.    Cardiovascular:  Negative for chest pain and palpitations.   Gastrointestinal:  Negative for diarrhea and vomiting.   Genitourinary:  Negative for dysuria and hematuria.   Musculoskeletal:  Positive for back pain. Negative for myalgias.   Skin:  Negative for itching and rash.   Neurological:  Negative for weakness and headaches.        Objective:     Vitals:    03/24/23 0732   BP: 120/86   Pulse: 80   Resp: 16   Temp: 96.8 °F (36 °C)        Physical Exam  Constitutional:       Appearance: He is not toxic-appearing.   HENT:      Head: Normocephalic and atraumatic.   Cardiovascular:      Rate and Rhythm: Regular rhythm.      Pulses: Normal pulses.   Pulmonary:      Effort: Pulmonary effort is normal.      Breath sounds: Normal breath sounds.    Abdominal:      General: Abdomen is flat. Bowel sounds are normal.      Palpations: Abdomen is soft.   Musculoskeletal:         General: No deformity. Normal range of motion.      Cervical back: Normal range of motion and neck supple. No bony tenderness.      Thoracic back: No bony tenderness.      Lumbar back: Spasms (right paraspinal) present. No bony tenderness.   Skin:     General: Skin is warm and dry.   Neurological:      General: No focal deficit present.      Mental Status: He is alert and oriented to person, place, and time.   Psychiatric:         Mood and Affect: Mood normal.         Behavior: Behavior normal.        Records:  Nursing records and triage records reviewed  Prior records reviewed    Medical decision making:     Presents to emergency department for chronic lower back pain.  Patient is nontoxic appearing.  Vital signs are within normal limits.  Patient has no red flag symptoms concerning for cauda equina.  On physical exam he had some mild right paraspinal lumbar tenderness to palpation concerning for spasm.  We had a long conversation on pain management.  Will receive ibuprofen and lidocaine patch inside the emergency department.  Will be discharged with a prescription for lidocaine patch, ibuprofen, Flexeril.  We discussed return precautions and I listed them of the discharge instructions.  Patient understands the plan, no further questions, felt safe for discharge.            EKG:       Procedures       Diagnosis:  Final diagnoses:  [M54.50, G89.29] Chronic right-sided low back pain without sciatica (Primary)     ED Prescriptions       Medication Sig Dispense Start Date End Date Auth. Provider    cyclobenzaprine (FLEXERIL) 10 MG tablet Take 1 tablet (10 mg total) by mouth 3 (three) times daily as needed for Muscle spasms. 15 tablet 3/24/2023 3/29/2023 Marcelo Pompa MD    ibuprofen (ADVIL,MOTRIN) 600 MG tablet Take 1 tablet (600 mg total) by mouth every 6 (six) hours as needed for  Pain. 30 tablet 3/24/2023 -- Marcelo Pompa MD    LIDOcaine (LIDODERM) 5 % Place 1 patch onto the skin once daily. Remove & Discard patch within 12 hours or as directed by MD 15 patch 3/24/2023 -- Marcelo Pompa MD          Follow-up Information    None         Marcelo Pompa M.D.  Emergency Medicine Physician     (Please note that this chart was completed via voice to text dictation. There may be typographical errors or substitutions that are unintentional, or uncorrected. Every attempt was made to proofread the chart prior to completion. If there are any questions, please contact the physician for final clarification).         Marcelo Pompa MD  03/24/23 0800

## 2023-03-25 NOTE — DISCHARGE INSTRUCTIONS
Increase fluid intake, clear liquids x 12 hours. Advance diet slowly.   Follow up with your primary care physician in 3-5 days for follow up evaluation.  Take medication as prescribed.  Return to the [facility] ED immediately for onset of chest pain, SOB, or fever.

## 2023-03-25 NOTE — ED PROVIDER NOTES
Encounter Date: 3/24/2023       History     Chief Complaint   Patient presents with    Abdominal Pain     Abdominal pain and vomiting (x) 1.5 hours ago. Also stated symptoms started after taking pain medications. No active vomiting at this time. Received 4mg of zofran enroute per ems. Hanna garcia     Pt is a 67 y.o. male who presents to the [facility] ED per EMS for abdominal pain and nausea. Pt seen this AM in the [facility] ED for lower back pain. Placed on pain medication and discharged home. Reports nausea began after he took a dose of the pain medication that was prescribed for him. Pt with Hx of A Fib, currently on Xarelto, CAD, hypertension, hyperlipidemia, BPH, chronic lower back pain. Denies free bleeding, rectal bleeding, black stool, maroon stool, chest pain, SOB, weakness, dizziness, or fever. Given 4 mg of Zofran in route per EMS.    Review of patient's allergies indicates:  No Known Allergies  Past Medical History:   Diagnosis Date    Arthritis     Atrial fibrillation     BPH (benign prostatic hyperplasia)     Cardiac arrest     Coronary artery disease     Cyst, kidney, acquired     Diverticulosis     Hyperlipidemia     Hypertension     MI (myocardial infarction)     Obesity     Steatosis of liver      Past Surgical History:   Procedure Laterality Date    A-V CARDIAC PACEMAKER INSERTION Right     CARDIAC CATHETERIZATION      COLONOSCOPY W/ BIOPSIES      excision of colon       Family History   Problem Relation Age of Onset    Hypertension Mother     Hypertension Father     Hypertension Sister      Social History     Tobacco Use    Smoking status: Former    Smokeless tobacco: Never   Substance Use Topics    Alcohol use: Not Currently    Drug use: Not Currently     Review of Systems   Constitutional:  Negative for chills, diaphoresis, fatigue and fever.   HENT:  Negative for facial swelling, postnasal drip, rhinorrhea, sinus pressure, sinus pain, sore throat and trouble swallowing.    Respiratory:  Negative for cough,  chest tightness, shortness of breath and wheezing.    Cardiovascular:  Negative for chest pain, palpitations and leg swelling.   Gastrointestinal:  Positive for abdominal pain and nausea. Negative for diarrhea and vomiting.   Genitourinary:  Negative for dysuria, flank pain, hematuria and urgency.   Musculoskeletal:  Negative for arthralgias, back pain and myalgias.   Skin:  Negative for color change and rash.   Neurological:  Negative for dizziness, syncope, weakness and headaches.   Hematological:  Does not bruise/bleed easily.   All other systems reviewed and are negative.    Physical Exam     Initial Vitals [03/24/23 1949]   BP Pulse Resp Temp SpO2   (!) 126/109 90 18 97.7 °F (36.5 °C) 98 %      MAP       --         Physical Exam    Nursing note and vitals reviewed.  Constitutional: Vital signs are normal. He appears well-developed and well-nourished.   HENT:   Head: Normocephalic.   Nose: Nose normal.   Mouth/Throat: Oropharynx is clear and moist.   Eyes: Conjunctivae and EOM are normal. Pupils are equal, round, and reactive to light.   Neck: Neck supple.   Normal range of motion.  Cardiovascular:  Normal rate, regular rhythm, normal heart sounds and intact distal pulses.           Pulmonary/Chest: Effort normal and breath sounds normal. No respiratory distress. He has no wheezes. He has no rhonchi. He has no rales. He exhibits no tenderness.   Abdominal: Abdomen is soft and flat. Bowel sounds are normal. There is abdominal tenderness in the epigastric area. There is no rebound, no guarding, no tenderness at McBurney's point and negative Lake's sign.   Musculoskeletal:         General: Normal range of motion.      Cervical back: Normal range of motion and neck supple.     Neurological: He is alert and oriented to person, place, and time. He has normal strength.   Skin: Skin is warm and dry. Capillary refill takes less than 2 seconds.   Psychiatric: He has a normal mood and affect. His behavior is normal.  Judgment and thought content normal.       ED Course   Procedures  Labs Reviewed   URINALYSIS, REFLEX TO URINE CULTURE - Abnormal; Notable for the following components:       Result Value    Appearance, UA Turbid (*)     Protein, UA 1+ (*)     Blood, UA 1+ (*)     Urobilinogen, UA 1+ (*)     Leukocyte Esterase,  (*)     WBC, UA 51-99 (*)     WBC Clumps, UA Trace (*)     Bacteria, UA Trace (*)     Squamous Epithelial Cells, UA Few (*)     Mucous, UA Trace (*)     Hyaline Casts, UA 3-5 (*)     All other components within normal limits   COMPREHENSIVE METABOLIC PANEL - Abnormal; Notable for the following components:    Potassium Level 3.4 (*)     Creatinine 1.70 (*)     Globulin 3.6 (*)     Aspartate Aminotransferase 47 (*)     All other components within normal limits   LIPASE - Normal   TROPONIN I - Normal   CK - Normal   CK-MB - Normal   CULTURE, URINE   CBC W/ AUTO DIFFERENTIAL    Narrative:     The following orders were created for panel order CBC Auto Differential.  Procedure                               Abnormality         Status                     ---------                               -----------         ------                     CBC with Differential[[id number]]                            Final result                 Please view results for these tests on the individual orders.   CBC WITH DIFFERENTIAL   EXTRA TUBES    Narrative:     The following orders were created for panel order EXTRA TUBES.  Procedure                               Abnormality         Status                     ---------                               -----------         ------                     Light Blue Top Hold[[id number]]                              In process                 Gold Top Hold[[id number]]                                    In process                   Please view results for these tests on the individual orders.   LIGHT BLUE TOP HOLD   GOLD TOP HOLD     EKG Readings: (Independently Interpreted)   Hr 79. Atrial  fibrillation with frequent ventricular-paced complexes. ST & T wave abnormality. No acute changes noted when compared to 3/19/23 EKG.     Imaging Results              X-Ray Abdomen Flat And Erect (Final result)  Result time 03/24/23 21:52:50      Final result by Sami Taylor MD (03/24/23 21:52:50)                   Impression:      Nonspecific bowel gas pattern.      Electronically signed by: Sami Taylor  Date:    03/24/2023  Time:    21:52               Narrative:    EXAMINATION:  XR ABDOMEN FLAT AND ERECT    CLINICAL HISTORY:  Unspecified abdominal pain    TECHNIQUE:  Two views    COMPARISON:  March 12, 2023    FINDINGS:  There is moderate colonic fecal loading.  The intestinal gas pattern is nonspecific and nonobstructive. No air fluid levels or pneumoperitoneum identified.  Visualized portion of the lungs are clear.                                       Medications   aluminum-magnesium hydroxide-simethicone 200-200-20 mg/5 mL suspension 30 mL (30 mLs Oral Given 3/24/23 2021)     And   LIDOcaine HCl 2% oral solution 15 mL (15 mLs Oral Given 3/24/23 2021)     Medical Decision Making:   Differential Diagnosis:   Abdominal pain   GERD  Gastroenteritis  UTI  AMI  Clinical Tests:   Lab Tests: Ordered and Reviewed  Radiological Study: Ordered and Reviewed  Medical Tests: Ordered and Reviewed  ED Management:  9:58 PM Reassessed patient at this time. Reports condition has improved. Discussed with patient all pertinent ED information and results. Discussed diagnosis and treatment plan with patient. On review of diagnostic results, pt does appear to have cystitis. I have instructed pt to follow a clear liquid diet and advance as tolerated. He should increase oral fluids and will be provided medication for symptoms control as well as for noted infection. Strict return precautions discussed. Follow up instructions and return to ED instruction have been given. All questions and concerns were addressed at this time.  Patient voices understanding of information and instructions. Patient is comfortable with plan and discharge. Patient is stable for discharge.        APC / Resident Notes:   I was not physically present during the history, exam or disposition of this patient.  I was available all times for consultation. (Melodie)                    Clinical Impression:   Final diagnoses:  [R11.2] Nausea & vomiting  [R10.9] Abdominal pain  [N30.90] Cystitis (Primary)        ED Disposition Condition    Discharge Stable          ED Prescriptions       Medication Sig Dispense Start Date End Date Auth. Provider    famotidine (PEPCID) 20 MG tablet  (Status: Discontinued) Take 1 tablet (20 mg total) by mouth 2 (two) times daily. for 10 days 20 tablet 3/24/2023 3/24/2023 TRUMAN Garcia Jr.    ciprofloxacin HCl (CIPRO) 500 MG tablet Take 1 tablet (500 mg total) by mouth 2 (two) times daily. for 10 days 20 tablet 3/24/2023 4/3/2023 Tani Hernandez Jr., TRUMAN    ondansetron (ZOFRAN-ODT) 4 MG TbDL Take 1 tablet (4 mg total) by mouth every 12 (twelve) hours as needed (n/v). 6 tablet 3/24/2023 3/27/2023 Tani Hernandez Jr., TRUMAN    famotidine (PEPCID) 20 MG tablet Take 1 tablet (20 mg total) by mouth 2 (two) times daily. for 10 days 20 tablet 3/24/2023 4/3/2023 TRUMAN Garcia Jr.          Follow-up Information       Follow up With Specialties Details Why Contact Info    [facility] - Emergency Dept Emergency Medicine In 3 days As needed, If symptoms worsen [address] [facility] [address] [phone]             TRUMAN Garcia Jr.  03/24/23 2206       Humberto Sewell MD  03/24/23 2358

## 2023-03-26 ENCOUNTER — HOSPITAL ENCOUNTER (EMERGENCY)
Facility: HOSPITAL | Age: 68
Discharge: HOME | End: 2023-03-26
Attending: EMERGENCY MEDICINE
Payer: MEDICARE

## 2023-03-26 VITALS
SYSTOLIC BLOOD PRESSURE: 150 MMHG | HEIGHT: 67 IN | RESPIRATION RATE: 16 BRPM | OXYGEN SATURATION: 100 % | TEMPERATURE: 98 F | BODY MASS INDEX: 37.72 KG/M2 | WEIGHT: 240.31 LBS | HEART RATE: 88 BPM | DIASTOLIC BLOOD PRESSURE: 90 MMHG

## 2023-03-26 DIAGNOSIS — G89.29 CHRONIC RIGHT-SIDED LOW BACK PAIN WITHOUT SCIATICA: Primary | ICD-10-CM

## 2023-03-26 DIAGNOSIS — M54.50 CHRONIC RIGHT-SIDED LOW BACK PAIN WITHOUT SCIATICA: Primary | ICD-10-CM

## 2023-03-26 LAB
BACTERIA UR CULT: NORMAL
POCT GLUCOSE: 103 MG/DL (ref 70–110)

## 2023-03-26 PROCEDURE — 82962 GLUCOSE BLOOD TEST: CPT

## 2023-03-26 PROCEDURE — 99283 EMERGENCY DEPT VISIT LOW MDM: CPT

## 2023-03-27 NOTE — ED PROVIDER NOTES
Encounter Date: 3/26/2023       History     Chief Complaint   Patient presents with    Back Pain     Pt reports nontraumatic lower back pain x 1 year.      68 YO AAM in ER with complaints of continued chronic right lower back pain. Denies injury/trauma, fever, chills, chest pain, SOB, abdominal pain, N/V/D, HA or dizziness. No other complaints.     The history is provided by the patient.   Review of patient's allergies indicates:  No Known Allergies  Past Medical History:   Diagnosis Date    Arthritis     Atrial fibrillation     BPH (benign prostatic hyperplasia)     Cardiac arrest     Coronary artery disease     Cyst, kidney, acquired     Diverticulosis     Hyperlipidemia     Hypertension     MI (myocardial infarction)     Obesity     Steatosis of liver      Past Surgical History:   Procedure Laterality Date    A-V CARDIAC PACEMAKER INSERTION Right     CARDIAC CATHETERIZATION      COLONOSCOPY W/ BIOPSIES      excision of colon       Family History   Problem Relation Age of Onset    Hypertension Mother     Hypertension Father     Hypertension Sister      Social History     Tobacco Use    Smoking status: Former    Smokeless tobacco: Never   Substance Use Topics    Alcohol use: Not Currently    Drug use: Not Currently     Review of Systems   Constitutional:  Negative for chills and fever.   HENT:  Negative for congestion and trouble swallowing.    Respiratory:  Negative for shortness of breath and wheezing.    Cardiovascular:  Negative for chest pain and leg swelling.   Gastrointestinal:  Negative for abdominal pain, diarrhea, nausea and vomiting.   Musculoskeletal:  Positive for back pain and myalgias. Negative for joint swelling.   Skin:  Negative for rash.   Neurological:  Negative for dizziness, weakness and headaches.   All other systems reviewed and are negative.    Physical Exam     Initial Vitals [03/26/23 1842]   BP Pulse Resp Temp SpO2   (!) 150/90 88 16 98.2 °F (36.8 °C) 100 %      MAP       --          Physical Exam    Nursing note and vitals reviewed.  Constitutional: He appears well-developed and well-nourished.   HENT:   Head: Normocephalic and atraumatic.   Nose: Nose normal.   Eyes: Conjunctivae are normal.   Neck: Neck supple.   Normal range of motion.  Cardiovascular:  Normal rate and regular rhythm.           Pulmonary/Chest: Breath sounds normal.   Musculoskeletal:      Cervical back: Normal range of motion and neck supple.      Lumbar back: Spasms and tenderness present. No swelling, edema or signs of trauma. Decreased range of motion.        Back:      Neurological: He is alert and oriented to person, place, and time.   Skin: Skin is warm and dry.   Psychiatric: He has a normal mood and affect.       ED Course   Procedures  Labs Reviewed   POCT GLUCOSE          Imaging Results    None          Medications - No data to display  Medical Decision Making:   History:   Old Medical Records: I decided to obtain old medical records.  Old Records Summarized: records from previous admission(s).       <> Summary of Records: Recently seen in ER for same with Rx for lidoderm 5% patches, ibuprofen and flexeril, has not taken all meds yet and has not put on a patch yet                        Clinical Impression:   Final diagnoses:  [M54.50, G89.29] Chronic right-sided low back pain without sciatica (Primary)        ED Disposition Condition    Discharge Stable          ED Prescriptions    None       Follow-up Information       Follow up With Specialties Details Why Contact Info    [facility] - Emergency Dept Emergency Medicine In 3 days As needed, If symptoms worsen [address] [facility] [id number] [phone]    Magdi Rivera, DO Internal Medicine Schedule an appointment as soon as possible for a visit in 3 days  [address] [facility] [address]  [phone]               KAVIN Wray  03/26/23 1959

## 2023-04-17 ENCOUNTER — HOSPITAL ENCOUNTER (EMERGENCY)
Facility: HOSPITAL | Age: 68
Discharge: HOME | End: 2023-04-17
Attending: EMERGENCY MEDICINE
Payer: MEDICARE

## 2023-04-17 ENCOUNTER — TELEPHONE (OUTPATIENT)
Dept: CARDIOLOGY | Facility: CLINIC | Age: 68
End: 2023-04-17
Payer: MEDICARE

## 2023-04-17 VITALS
SYSTOLIC BLOOD PRESSURE: 145 MMHG | TEMPERATURE: 98 F | BODY MASS INDEX: 33.47 KG/M2 | WEIGHT: 226 LBS | RESPIRATION RATE: 18 BRPM | HEART RATE: 74 BPM | DIASTOLIC BLOOD PRESSURE: 100 MMHG | OXYGEN SATURATION: 100 % | HEIGHT: 69 IN

## 2023-04-17 DIAGNOSIS — G89.29 CHRONIC LOW BACK PAIN, UNSPECIFIED BACK PAIN LATERALITY, UNSPECIFIED WHETHER SCIATICA PRESENT: Primary | ICD-10-CM

## 2023-04-17 DIAGNOSIS — M54.50 CHRONIC LOW BACK PAIN, UNSPECIFIED BACK PAIN LATERALITY, UNSPECIFIED WHETHER SCIATICA PRESENT: Primary | ICD-10-CM

## 2023-04-17 PROCEDURE — 99283 EMERGENCY DEPT VISIT LOW MDM: CPT | Mod: 25

## 2023-04-17 PROCEDURE — 25000003 PHARM REV CODE 250: Performed by: PHYSICIAN ASSISTANT

## 2023-04-17 RX ORDER — TRAMADOL HYDROCHLORIDE 50 MG/1
50 TABLET ORAL
Status: COMPLETED | OUTPATIENT
Start: 2023-04-17 | End: 2023-04-17

## 2023-04-17 RX ADMIN — TRAMADOL HYDROCHLORIDE 50 MG: 50 TABLET, COATED ORAL at 08:04

## 2023-04-17 NOTE — ED PROVIDER NOTES
Encounter Date: 4/17/2023       History     Chief Complaint   Patient presents with    Back Pain     Lower back pain that started last night. Reports hx of arthritis. Denies injury.      Patient reports tot he ER with exacerbation of chronic back pain; pt denies fall/injury    The history is provided by the patient.   Back Pain   This is a recurrent problem. The current episode started yesterday. The problem occurs throughout the day. The problem has been unchanged. The pain is associated with no known injury. The pain is present in the lumbar spine. The quality of the pain is described as aching. The pain is at a severity of 4/10. The symptoms are aggravated by bending, twisting and certain positions. The pain is The same all the time. Pertinent negatives include no chest pain, no fever, no abdominal pain, no bowel incontinence, no perianal numbness, no bladder incontinence, no dysuria, no leg pain, no paresthesias and no weakness. He has tried bed rest and analgesics for the symptoms.   Review of patient's allergies indicates:  No Known Allergies  Past Medical History:   Diagnosis Date    Arthritis     Atrial fibrillation     BPH (benign prostatic hyperplasia)     Cardiac arrest     Coronary artery disease     Cyst, kidney, acquired     Diverticulosis     Hyperlipidemia     Hypertension     MI (myocardial infarction)     Obesity     Steatosis of liver      Past Surgical History:   Procedure Laterality Date    A-V CARDIAC PACEMAKER INSERTION Right     CARDIAC CATHETERIZATION      COLONOSCOPY W/ BIOPSIES      excision of colon       Family History   Problem Relation Age of Onset    Hypertension Mother     Hypertension Father     Hypertension Sister      Social History     Tobacco Use    Smoking status: Former    Smokeless tobacco: Never   Substance Use Topics    Alcohol use: Not Currently    Drug use: Not Currently     Review of Systems   Constitutional:  Negative for fever.   HENT:  Negative for sore throat.     Respiratory:  Negative for shortness of breath.    Cardiovascular:  Negative for chest pain.   Gastrointestinal:  Negative for abdominal pain, bowel incontinence and nausea.   Genitourinary:  Negative for bladder incontinence and dysuria.   Musculoskeletal:  Positive for back pain.   Skin:  Negative for rash.   Neurological:  Negative for weakness and paresthesias.   Hematological:  Does not bruise/bleed easily.   Psychiatric/Behavioral: Negative.       Physical Exam     Initial Vitals [04/17/23 0710]   BP Pulse Resp Temp SpO2   (!) 145/100 74 18 98.4 °F (36.9 °C) 100 %      MAP       --         Physical Exam    Vitals reviewed.  Constitutional: He appears well-developed.   HENT:   Head: Normocephalic and atraumatic.   Eyes: Conjunctivae and EOM are normal. Pupils are equal, round, and reactive to light.   Neck:   Normal range of motion.  Cardiovascular:  Normal rate, regular rhythm, normal heart sounds and intact distal pulses.           Pulmonary/Chest: Breath sounds normal. He exhibits no tenderness.   Abdominal: Abdomen is soft. Bowel sounds are normal. He exhibits no distension. There is no abdominal tenderness.   Musculoskeletal:      Cervical back: Normal and normal range of motion.      Thoracic back: Normal.      Lumbar back: Tenderness present. No deformity. Decreased range of motion.     Neurological: He is alert and oriented to person, place, and time. He displays normal reflexes. No cranial nerve deficit or sensory deficit. GCS score is 15. GCS eye subscore is 4. GCS verbal subscore is 5. GCS motor subscore is 6.   Skin: Skin is warm. No pallor.   Psychiatric: He has a normal mood and affect. His behavior is normal. Judgment and thought content normal.       ED Course   Procedures  Labs Reviewed - No data to display       Imaging Results    None          Medications   traMADoL tablet 50 mg (50 mg Oral Given 4/17/23 3209)                              Clinical Impression:   Final diagnoses:  [M54.50,  G89.29] Chronic low back pain, unspecified back pain laterality, unspecified whether sciatica present (Primary)        ED Disposition Condition    Discharge Stable          ED Prescriptions    None       Follow-up Information       Follow up With Specialties Details Why Contact Info    discharge followup    If your symptoms become WORSE or you DO NOT IMPROVE and you are unable to reach your health care provider, you should RETURN to the emergency department    discharge info    Discussed all pertinent ED information, results, diagnosis and treatment plan; All questions and concerns were addressed at this time. Patient voices understanding of information and instructions. Patient is comfortable with plan and discharge             KAVIN Vences  04/22/23 3573

## 2023-04-17 NOTE — TELEPHONE ENCOUNTER
----- Message from Marylu Brown DO sent at 4/17/2023  8:45 AM CDT -----  Can we make a note for his next appointment that we need to review his Echo and discuss the need for MVR?     Marylu Gillespie   ----- Message -----  From: Zaria Del Toro RN  Sent: 3/23/2023  11:09 AM CDT  To: Marylu Brown DO    Called to notify pt of new orders. Not able to schedule Echo that far out, scheduling will call patient around July to get echo scheduled.       ----- Message -----  From: Marylu Brown DO  Sent: 3/23/2023   9:16 AM CDT  To: [facility] Clinical Support Staff    I reviewed this echo with Dr. Villegas. He will need a repeat in September. I have placed the order. With it being that far out, will he need to schedule it? Or will they call and schedule it for him?     His LDL was also very low and he will need a repeat FLP prior to his 5/2 appointment so they can determine if his Lipitor dose needs to be reduced.     ----- Message -----  From: Rico Celis MD  Sent: 3/10/2023   5:00 PM CDT  To: Marylu Brown DO

## 2023-04-21 ENCOUNTER — HOSPITAL ENCOUNTER (EMERGENCY)
Facility: HOSPITAL | Age: 68
Discharge: HOME | End: 2023-04-21
Attending: INTERNAL MEDICINE
Payer: MEDICARE

## 2023-04-21 VITALS
DIASTOLIC BLOOD PRESSURE: 101 MMHG | HEIGHT: 69 IN | SYSTOLIC BLOOD PRESSURE: 147 MMHG | TEMPERATURE: 98 F | WEIGHT: 210 LBS | HEART RATE: 84 BPM | OXYGEN SATURATION: 99 % | BODY MASS INDEX: 31.1 KG/M2 | RESPIRATION RATE: 16 BRPM

## 2023-04-21 DIAGNOSIS — S39.012A LUMBAR STRAIN, INITIAL ENCOUNTER: Primary | ICD-10-CM

## 2023-04-21 PROCEDURE — 25000003 PHARM REV CODE 250: Performed by: PHYSICIAN ASSISTANT

## 2023-04-21 PROCEDURE — 99283 EMERGENCY DEPT VISIT LOW MDM: CPT

## 2023-04-21 RX ORDER — ACETAMINOPHEN 500 MG
1000 TABLET ORAL
Status: COMPLETED | OUTPATIENT
Start: 2023-04-21 | End: 2023-04-21

## 2023-04-21 RX ORDER — METHOCARBAMOL 750 MG/1
750 TABLET, FILM COATED ORAL
Status: COMPLETED | OUTPATIENT
Start: 2023-04-21 | End: 2023-04-21

## 2023-04-21 RX ORDER — METHOCARBAMOL 750 MG/1
1500 TABLET, FILM COATED ORAL 3 TIMES DAILY PRN
Qty: 24 TABLET | Refills: 0 | Status: SHIPPED | OUTPATIENT
Start: 2023-04-21 | End: 2023-04-28

## 2023-04-21 RX ADMIN — METHOCARBAMOL 750 MG: 750 TABLET ORAL at 05:04

## 2023-04-21 RX ADMIN — ACETAMINOPHEN 1000 MG: 500 TABLET, FILM COATED ORAL at 05:04

## 2023-04-21 NOTE — DISCHARGE INSTRUCTIONS
Report to Emergency Department if symptoms return or worsen; [facility] Within 1 to 2 days, It is important that you follow up with your primary care provider or specialist if indicated for further evaluation, workup, and treatment as necessary. The exam and treatment you received in Emergency Department was for an urgent problem and NOT INTENDED AS COMPLETE CARE. It is important that you FOLLOW UP with a doctor for ongoing care. If your symptoms become WORSE or you DO NOT IMPROVE and you are unable to reach your health care provider, you should RETURN to the Emergency Department. The Emergency Department provider has provided a PRELIMINARY INTERPRETATION of all your studies. A final interpretation may be done after you are discharged. If a change in your diagnosis or treatment is needed WE WILL CONTACT YOU. It is critical that we have a CURRENT PHONE NUMBER FOR YOU.

## 2023-04-21 NOTE — ED PROVIDER NOTES
Encounter Date: 4/21/2023       History     Chief Complaint   Patient presents with    Back Pain     PT  IN /[facility] REPORTS BACK WHILE WAITING FOR BUS PTA.  NO INJURY.      68 yo M w/ extensive PMHx presents to ED via EMS for low back pain. Patient reports he was sitting at bus stop waiting for [employer] bus & when he stood up his back hurt. Reports pain is along midline of lower back. Denies any recent falls or other injury. Denies saddle anesthesia, incontinence, bowel/bladder retention, LE numbness, LE paresthesia, LE focal weakness, F/C, generalized weakness, fatigue, unexplained weight loss, night sweats, appetite changes. Able to bear weight & ambulate in ED. Mildly hypertensive on arrival, vitals otherwise stable, patient in NAD.    Review of patient's allergies indicates:  No Known Allergies  Past Medical History:   Diagnosis Date    Arthritis     Atrial fibrillation     BPH (benign prostatic hyperplasia)     Cardiac arrest     Coronary artery disease     Cyst, kidney, acquired     Diverticulosis     Hyperlipidemia     Hypertension     MI (myocardial infarction)     Obesity     Steatosis of liver      Past Surgical History:   Procedure Laterality Date    A-V CARDIAC PACEMAKER INSERTION Right     CARDIAC CATHETERIZATION      COLONOSCOPY W/ BIOPSIES      excision of colon       Family History   Problem Relation Age of Onset    Hypertension Mother     Hypertension Father     Hypertension Sister      Social History     Tobacco Use    Smoking status: Former    Smokeless tobacco: Never   Substance Use Topics    Alcohol use: Not Currently    Drug use: Not Currently     Review of Systems   All other systems reviewed and are negative.    Physical Exam     Initial Vitals [04/21/23 1735]   BP Pulse Resp Temp SpO2   (!) 147/101 (!) 18 16 97.9 °F (36.6 °C) 99 %      MAP       --         Physical Exam    Nursing note and vitals reviewed.  Constitutional: He appears well-developed and well-nourished. He is not diaphoretic. No  distress.   HENT:   Head: Normocephalic and atraumatic.   Eyes: Conjunctivae are normal. Pupils are equal, round, and reactive to light.   Neck: Neck supple.   Normal range of motion.  Cardiovascular:  Normal rate, regular rhythm, normal heart sounds and intact distal pulses.     Exam reveals no gallop and no friction rub.       No murmur heard.  Pulmonary/Chest: Breath sounds normal. No respiratory distress. He has no wheezes. He has no rhonchi. He has no rales.   Abdominal: Abdomen is soft. Bowel sounds are normal. He exhibits no distension. There is no abdominal tenderness. There is no rebound and no guarding.   Musculoskeletal:         General: No edema. Normal range of motion.      Cervical back: Normal, normal range of motion and neck supple.      Thoracic back: Normal.      Lumbar back: Tenderness (b/l paraspinal muscles) present. No swelling, edema, deformity, signs of trauma, lacerations, spasms or bony tenderness. Normal range of motion. Negative right straight leg raise test and negative left straight leg raise test. No scoliosis.     Neurological: He is alert and oriented to person, place, and time. He has normal strength and normal reflexes. A sensory deficit is present. No cranial nerve deficit.   Skin: Skin is warm and dry. Capillary refill takes less than 2 seconds. No rash noted. No erythema. No pallor.   Psychiatric: He has a normal mood and affect.       ED Course   Procedures  Labs Reviewed - No data to display       Imaging Results              X-Ray Lumbar Spine Ap And Lateral (Final result)  Result time 04/21/23 18:36:06      Final result by Polo Daniel MD (04/21/23 18:36:06)                   Impression:      Mild degenerative changes.      Electronically signed by: Polo Daniel  Date:    04/21/2023  Time:    18:36               Narrative:    EXAMINATION:  XR LUMBAR SPINE AP AND LATERAL    CLINICAL HISTORY:  low back pain;    TECHNIQUE:  Frontal and lateral radiographs of the lumbar  spine    COMPARISON:  Radiography 09/06/2022    FINDINGS:  For the purposes of this report, there are 5 lumbar vertebral bodies. Lumbar vertebral body heights are maintained.  No significant listhesis.  Mild multilevel degenerative changes.                                       Medications   acetaminophen tablet 1,000 mg (1,000 mg Oral Given 4/21/23 1753)   methocarbamoL tablet 750 mg (750 mg Oral Given 4/21/23 1753)     Medical Decision Making:   Clinical Tests:   Radiological Study: Ordered and Reviewed  Lumbar XR reveals mild degenerative changes, but otherwise unremarkable. Physical exam unremarkable w/o signs of spinal cord compression. Patient denies any injury & is able to ambulate in ED. Given meds in ED & reports moderate improvement of pain. Will discharge w/ muscle relaxer for further symptom relief at home. Instructed to contact PCP on Monday for follow-up. ED precautions given for new or worsening symptoms.                        Clinical Impression:   Final diagnoses:  [S39.012A] Lumbar strain, initial encounter (Primary)        ED Disposition Condition    Discharge Good          ED Prescriptions       Medication Sig Dispense Start Date End Date Auth. Provider    methocarbamoL (ROBAXIN-750) 750 MG Tab Take 2 tablets (1,500 mg total) by mouth 3 (three) times daily as needed (back pain). 24 tablet 4/21/2023 4/28/2023 KAVIN Mejia          Follow-up Information       Follow up With Specialties Details Why Contact Info    Contact PCP on Monday for follow-up appointment        [facility] Emergency Medicine  As needed, If symptoms worsen [address] [facility] [id number] [phone]             KAVIN Mejia  04/21/23 1911

## 2023-04-28 ENCOUNTER — HOSPITAL ENCOUNTER (EMERGENCY)
Facility: HOSPITAL | Age: 68
Discharge: HOME | End: 2023-04-28
Attending: FAMILY MEDICINE
Payer: MEDICARE

## 2023-04-28 VITALS
DIASTOLIC BLOOD PRESSURE: 80 MMHG | SYSTOLIC BLOOD PRESSURE: 113 MMHG | HEART RATE: 61 BPM | HEIGHT: 70 IN | TEMPERATURE: 97 F | BODY MASS INDEX: 30.13 KG/M2 | RESPIRATION RATE: 20 BRPM | OXYGEN SATURATION: 98 %

## 2023-04-28 DIAGNOSIS — M54.9 BACK PAIN, UNSPECIFIED BACK LOCATION, UNSPECIFIED BACK PAIN LATERALITY, UNSPECIFIED CHRONICITY: Primary | ICD-10-CM

## 2023-04-28 PROCEDURE — 99283 EMERGENCY DEPT VISIT LOW MDM: CPT

## 2023-04-28 PROCEDURE — 25000003 PHARM REV CODE 250: Performed by: PHYSICIAN ASSISTANT

## 2023-04-28 RX ORDER — TRAMADOL HYDROCHLORIDE 50 MG/1
50 TABLET ORAL EVERY 12 HOURS PRN
Qty: 6 TABLET | Refills: 0 | Status: SHIPPED | OUTPATIENT
Start: 2023-04-28 | End: 2023-05-01

## 2023-04-28 RX ORDER — TRAMADOL HYDROCHLORIDE 50 MG/1
50 TABLET ORAL
Status: COMPLETED | OUTPATIENT
Start: 2023-04-28 | End: 2023-04-28

## 2023-04-28 RX ADMIN — TRAMADOL HYDROCHLORIDE 50 MG: 50 TABLET, COATED ORAL at 12:04

## 2023-04-28 NOTE — ED PROVIDER NOTES
Encounter Date: 4/28/2023       History     Chief Complaint   Patient presents with    Back Pain     PT W CHRONIC BACK PAIN STATES HURT IT GETTING OUT OF CAB TODAY TO COME TO PHARM. HERE.      Patient reports to the ER with complaints of chronic back pain that worsened after getting out of a cab prior to arrival to the hospital when he was on his way to  medications at the pharmacy    The history is provided by the patient.   Back Pain   This is a recurrent problem. The current episode started just prior to arrival. The problem occurs throughout the day. The problem has been unchanged. The pain is associated with twisting. The pain is present in the lumbar spine. The pain is at a severity of 5/10. The symptoms are aggravated by bending, twisting and certain positions. The pain is The same all the time. Pertinent negatives include no chest pain, no fever, no weight loss, no abdominal pain, no bowel incontinence, no bladder incontinence, no dysuria, no pelvic pain, no paresthesias, no paresis and no weakness. He has tried nothing for the symptoms.   Review of patient's allergies indicates:  No Known Allergies  Past Medical History:   Diagnosis Date    Arthritis     Atrial fibrillation     BPH (benign prostatic hyperplasia)     Cardiac arrest     Coronary artery disease     Cyst, kidney, acquired     Diverticulosis     Hyperlipidemia     Hypertension     MI (myocardial infarction)     Obesity     Steatosis of liver      Past Surgical History:   Procedure Laterality Date    A-V CARDIAC PACEMAKER INSERTION Right     CARDIAC CATHETERIZATION      COLONOSCOPY W/ BIOPSIES      excision of colon       Family History   Problem Relation Age of Onset    Hypertension Mother     Hypertension Father     Hypertension Sister      Social History     Tobacco Use    Smoking status: Former    Smokeless tobacco: Never   Substance Use Topics    Alcohol use: Not Currently    Drug use: Not Currently     Review of Systems    Constitutional:  Negative for fever and weight loss.   HENT:  Negative for sore throat.    Respiratory:  Negative for shortness of breath.    Cardiovascular:  Negative for chest pain.   Gastrointestinal:  Negative for abdominal pain, bowel incontinence and nausea.   Genitourinary:  Negative for bladder incontinence, dysuria and pelvic pain.   Musculoskeletal:  Positive for back pain.   Skin:  Negative for rash.   Neurological:  Negative for weakness and paresthesias.   Hematological:  Does not bruise/bleed easily.   Psychiatric/Behavioral: Negative.       Physical Exam     Initial Vitals [04/28/23 1108]   BP Pulse Resp Temp SpO2   113/80 61 18 97.2 °F (36.2 °C) 98 %      MAP       --         Physical Exam    Vitals reviewed.  Constitutional: He appears well-developed.   HENT:   Head: Normocephalic and atraumatic.   Eyes: Conjunctivae and EOM are normal. Pupils are equal, round, and reactive to light.   Neck:   Normal range of motion.  Cardiovascular:  Normal rate, regular rhythm, normal heart sounds and intact distal pulses.           Pulmonary/Chest: Breath sounds normal. He exhibits no tenderness.   Abdominal: Abdomen is soft. Bowel sounds are normal. He exhibits no distension. There is no abdominal tenderness.   Musculoskeletal:      Cervical back: Normal and normal range of motion.      Thoracic back: Normal.      Lumbar back: Tenderness present. No swelling. Decreased range of motion.        Back:      Neurological: He is alert and oriented to person, place, and time. He displays normal reflexes. No cranial nerve deficit or sensory deficit. GCS score is 15. GCS eye subscore is 4. GCS verbal subscore is 5. GCS motor subscore is 6.   Skin: Skin is warm. No pallor.   Psychiatric: He has a normal mood and affect. His behavior is normal. Judgment and thought content normal.       ED Course   Procedures  Labs Reviewed - No data to display       Imaging Results    None          Medications   traMADoL tablet 50 mg  (50 mg Oral Given 4/28/23 1228)                              Clinical Impression:   Final diagnoses:  [M54.9] Back pain, unspecified back location, unspecified back pain laterality, unspecified chronicity (Primary)        ED Disposition Condition    Discharge Stable          ED Prescriptions       Medication Sig Dispense Start Date End Date Auth. Provider    traMADoL (ULTRAM) 50 mg tablet Take 1 tablet (50 mg total) by mouth every 12 (twelve) hours as needed for Pain. 6 tablet 4/28/2023 5/1/2023 KAVIN Vences          Follow-up Information       Follow up With Specialties Details Why Contact Info    discharge followup    If your symptoms become WORSE or you DO NOT IMPROVE and you are unable to reach your health care provider, you should RETURN to the emergency department    discharge info    Discussed all pertinent ED information, results, diagnosis and treatment plan; All questions and concerns were addressed at this time. Patient voices understanding of information and instructions. Patient is comfortable with plan and discharge             KAVIN Vences  04/28/23 6116

## 2023-05-02 ENCOUNTER — OFFICE VISIT (OUTPATIENT)
Dept: CARDIOLOGY | Facility: CLINIC | Age: 68
End: 2023-05-02
Payer: MEDICARE

## 2023-05-02 ENCOUNTER — HOSPITAL ENCOUNTER (EMERGENCY)
Facility: HOSPITAL | Age: 68
Discharge: HOME | End: 2023-05-02
Attending: FAMILY MEDICINE
Payer: MEDICARE

## 2023-05-02 ENCOUNTER — CLINICAL SUPPORT (OUTPATIENT)
Dept: CARDIOLOGY | Facility: CLINIC | Age: 68
End: 2023-05-02
Payer: MEDICARE

## 2023-05-02 VITALS
DIASTOLIC BLOOD PRESSURE: 98 MMHG | OXYGEN SATURATION: 99 % | BODY MASS INDEX: 35.92 KG/M2 | HEIGHT: 69 IN | TEMPERATURE: 98 F | RESPIRATION RATE: 18 BRPM | HEART RATE: 61 BPM | WEIGHT: 242.5 LBS | SYSTOLIC BLOOD PRESSURE: 136 MMHG

## 2023-05-02 VITALS
HEART RATE: 65 BPM | BODY MASS INDEX: 33.15 KG/M2 | RESPIRATION RATE: 20 BRPM | DIASTOLIC BLOOD PRESSURE: 86 MMHG | OXYGEN SATURATION: 100 % | WEIGHT: 223.81 LBS | SYSTOLIC BLOOD PRESSURE: 140 MMHG | HEIGHT: 69 IN | TEMPERATURE: 98 F

## 2023-05-02 DIAGNOSIS — E78.00 HIGH CHOLESTEROL: ICD-10-CM

## 2023-05-02 DIAGNOSIS — Z86.79 H/O VENTRICULAR FIBRILLATION: ICD-10-CM

## 2023-05-02 DIAGNOSIS — Z95.810 ICD (IMPLANTABLE CARDIOVERTER-DEFIBRILLATOR) IN PLACE: Primary | ICD-10-CM

## 2023-05-02 DIAGNOSIS — I10 HYPERTENSION, UNSPECIFIED TYPE: ICD-10-CM

## 2023-05-02 DIAGNOSIS — Z95.810 ICD (IMPLANTABLE CARDIOVERTER-DEFIBRILLATOR) IN PLACE: ICD-10-CM

## 2023-05-02 DIAGNOSIS — I25.10 CORONARY ARTERY DISEASE INVOLVING NATIVE HEART WITHOUT ANGINA PECTORIS, UNSPECIFIED VESSEL OR LESION TYPE: ICD-10-CM

## 2023-05-02 DIAGNOSIS — E87.6 HYPOKALEMIA: Primary | ICD-10-CM

## 2023-05-02 DIAGNOSIS — R11.2 NAUSEA AND VOMITING: ICD-10-CM

## 2023-05-02 LAB
ALBUMIN SERPL-MCNC: 3.7 G/DL (ref 3.4–4.8)
ALBUMIN/GLOB SERPL: 1.1 RATIO (ref 1.1–2)
ALP SERPL-CCNC: 65 UNIT/L (ref 40–150)
ALT SERPL-CCNC: 23 UNIT/L (ref 0–55)
APPEARANCE UR: CLEAR
AST SERPL-CCNC: 20 UNIT/L (ref 5–34)
BACTERIA #/AREA URNS AUTO: ABNORMAL /HPF
BASOPHILS # BLD AUTO: 0.08 X10(3)/MCL
BASOPHILS NFR BLD AUTO: 1.3 %
BILIRUB UR QL STRIP.AUTO: NEGATIVE MG/DL
BILIRUBIN DIRECT+TOT PNL SERPL-MCNC: 1.1 MG/DL
BUN SERPL-MCNC: 20.7 MG/DL (ref 8.4–25.7)
CALCIUM SERPL-MCNC: 8.9 MG/DL (ref 8.8–10)
CHLORIDE SERPL-SCNC: 110 MMOL/L (ref 98–107)
CO2 SERPL-SCNC: 22 MMOL/L (ref 23–31)
COLOR UR AUTO: YELLOW
CREAT SERPL-MCNC: 1.12 MG/DL (ref 0.73–1.18)
EOSINOPHIL # BLD AUTO: 0.1 X10(3)/MCL (ref 0–0.9)
EOSINOPHIL NFR BLD AUTO: 1.6 %
ERYTHROCYTE [DISTWIDTH] IN BLOOD BY AUTOMATED COUNT: 14.2 % (ref 11.5–17)
GFR SERPLBLD CREATININE-BSD FMLA CKD-EPI: >60 MLS/MIN/1.73/M2
GLOBULIN SER-MCNC: 3.3 GM/DL (ref 2.4–3.5)
GLUCOSE SERPL-MCNC: 106 MG/DL (ref 82–115)
GLUCOSE UR QL STRIP.AUTO: NORMAL MG/DL
HCT VFR BLD AUTO: 41.1 % (ref 42–52)
HGB BLD-MCNC: 13.9 G/DL (ref 14–18)
HYALINE CASTS #/AREA URNS LPF: ABNORMAL /LPF
IMM GRANULOCYTES # BLD AUTO: 0.05 X10(3)/MCL (ref 0–0.04)
IMM GRANULOCYTES NFR BLD AUTO: 0.8 %
KETONES UR QL STRIP.AUTO: NEGATIVE MG/DL
LEUKOCYTE ESTERASE UR QL STRIP.AUTO: NEGATIVE UNIT/L
LIPASE SERPL-CCNC: 24 U/L
LYMPHOCYTES # BLD AUTO: 2.09 X10(3)/MCL (ref 0.6–4.6)
LYMPHOCYTES NFR BLD AUTO: 33.7 %
MCH RBC QN AUTO: 29.3 PG (ref 27–31)
MCHC RBC AUTO-ENTMCNC: 33.8 G/DL (ref 33–36)
MCV RBC AUTO: 86.7 FL (ref 80–94)
MONOCYTES # BLD AUTO: 0.56 X10(3)/MCL (ref 0.1–1.3)
MONOCYTES NFR BLD AUTO: 9 %
MUCOUS THREADS URNS QL MICRO: ABNORMAL /LPF
NEUTROPHILS # BLD AUTO: 3.32 X10(3)/MCL (ref 2.1–9.2)
NEUTROPHILS NFR BLD AUTO: 53.6 %
NITRITE UR QL STRIP.AUTO: NEGATIVE
NRBC BLD AUTO-RTO: 0 %
PH UR STRIP.AUTO: 6 [PH]
PLATELET # BLD AUTO: 251 X10(3)/MCL (ref 130–400)
PMV BLD AUTO: 10.1 FL (ref 7.4–10.4)
POTASSIUM SERPL-SCNC: 3.4 MMOL/L (ref 3.5–5.1)
PROT SERPL-MCNC: 7 GM/DL (ref 5.8–7.6)
PROT UR QL STRIP.AUTO: ABNORMAL MG/DL
RBC # BLD AUTO: 4.74 X10(6)/MCL (ref 4.7–6.1)
RBC #/AREA URNS AUTO: ABNORMAL /HPF
RBC UR QL AUTO: ABNORMAL UNIT/L
SODIUM SERPL-SCNC: 141 MMOL/L (ref 136–145)
SP GR UR STRIP.AUTO: 1.03
SQUAMOUS #/AREA URNS LPF: ABNORMAL /HPF
TROPONIN I SERPL-MCNC: 0.01 NG/ML (ref 0–0.04)
UROBILINOGEN UR STRIP-ACNC: NORMAL MG/DL
WBC # SPEC AUTO: 6.2 X10(3)/MCL (ref 4.5–11.5)
WBC #/AREA URNS AUTO: ABNORMAL /HPF

## 2023-05-02 PROCEDURE — 80053 COMPREHEN METABOLIC PANEL: CPT | Performed by: NURSE PRACTITIONER

## 2023-05-02 PROCEDURE — 99215 OFFICE O/P EST HI 40 MIN: CPT | Mod: PBBFAC,25 | Performed by: INTERNAL MEDICINE

## 2023-05-02 PROCEDURE — 99212 OFFICE O/P EST SF 10 MIN: CPT | Mod: PBBFAC,25,27

## 2023-05-02 PROCEDURE — 84484 ASSAY OF TROPONIN QUANT: CPT | Performed by: NURSE PRACTITIONER

## 2023-05-02 PROCEDURE — 85025 COMPLETE CBC W/AUTO DIFF WBC: CPT | Performed by: NURSE PRACTITIONER

## 2023-05-02 PROCEDURE — 83690 ASSAY OF LIPASE: CPT | Performed by: NURSE PRACTITIONER

## 2023-05-02 PROCEDURE — 99285 EMERGENCY DEPT VISIT HI MDM: CPT | Mod: 25,27

## 2023-05-02 PROCEDURE — 93005 ELECTROCARDIOGRAM TRACING: CPT

## 2023-05-02 PROCEDURE — 93283 PRGRMG EVAL IMPLANTABLE DFB: CPT | Mod: PBBFAC | Performed by: INTERNAL MEDICINE

## 2023-05-02 PROCEDURE — 81001 URINALYSIS AUTO W/SCOPE: CPT | Performed by: NURSE PRACTITIONER

## 2023-05-02 RX ORDER — METOPROLOL SUCCINATE 200 MG/1
200 TABLET, EXTENDED RELEASE ORAL 2 TIMES DAILY
Qty: 180 TABLET | Refills: 1 | Status: SHIPPED | OUTPATIENT
Start: 2023-05-02 | End: 2023-11-07 | Stop reason: SDUPTHER

## 2023-05-02 RX ORDER — ATORVASTATIN CALCIUM 40 MG/1
40 TABLET, FILM COATED ORAL DAILY
Qty: 90 TABLET | Refills: 1 | Status: SHIPPED | OUTPATIENT
Start: 2023-05-02 | End: 2023-11-07 | Stop reason: SDUPTHER

## 2023-05-02 RX ORDER — NIFEDIPINE 30 MG/1
30 TABLET, EXTENDED RELEASE ORAL DAILY
Status: ON HOLD | COMMUNITY
Start: 2023-02-09 | End: 2023-08-16 | Stop reason: HOSPADM

## 2023-05-02 RX ORDER — DILTIAZEM HYDROCHLORIDE 360 MG/1
360 CAPSULE, EXTENDED RELEASE ORAL DAILY
Qty: 90 CAPSULE | Refills: 1 | Status: SHIPPED | OUTPATIENT
Start: 2023-05-02 | End: 2023-11-07 | Stop reason: SDUPTHER

## 2023-05-02 RX ORDER — ONDANSETRON 4 MG/1
4 TABLET, ORALLY DISINTEGRATING ORAL EVERY 12 HOURS PRN
Status: ON HOLD | COMMUNITY
Start: 2023-03-27 | End: 2023-08-28 | Stop reason: HOSPADM

## 2023-05-02 RX ORDER — LOSARTAN POTASSIUM 50 MG/1
50 TABLET ORAL DAILY
Qty: 90 TABLET | Refills: 1 | Status: SHIPPED | OUTPATIENT
Start: 2023-05-02 | End: 2023-11-07 | Stop reason: SDUPTHER

## 2023-05-02 RX ORDER — SPIRONOLACTONE 50 MG/1
50 TABLET, FILM COATED ORAL DAILY
Qty: 90 TABLET | Refills: 1 | Status: SHIPPED | OUTPATIENT
Start: 2023-05-02 | End: 2023-11-07 | Stop reason: SDUPTHER

## 2023-05-02 NOTE — PROGRESS NOTES
Cardiology Attending    I evaluated Delio Daniel  and discussed the patient's symptoms, findings, and management plan with the resident.  Please see the Cardiology note for details.

## 2023-05-02 NOTE — PATIENT INSTRUCTIONS
Start taking Diltiazem 360 mg every day.  See Dr. Hernandez with [facility] for ablation  Follow up in 3 months for device interrogation

## 2023-05-02 NOTE — ED PROVIDER NOTES
Encounter Date: 5/2/2023       History     Chief Complaint   Patient presents with    Vomiting     Pt states while waiting for cardiology appointment at 8 a.m. he got nauseated and started vomiting. No other complaints at this time. Hanna garcia     The patient came early to the hospital for a 0800 cardiology appointment , he was sitting in the lobby when he felt nauseated - went to the bathroom and vomited x1.  He reports that he feels fine at this time and requests something to drink. The onset was just prior to arrival.  The course/duration of symptoms is resolved.  The character of symptoms was nausea.  The degree at onset was minimal.  The Location of pain at onset was none.  The degree at present is none.  Radiating pain: none. There are exacerbating factors including none.  There are relieving factors including vomiting.  Therapy today: none.  Risk factors consist of past medical history of Arthritis, Atrial fibrillation, BPH (benign prostatic hyperplasia), Cardiac arrest, Coronary artery disease, Cyst, kidney, acquired, Diverticulosis, Hyperlipidemia, Hypertension, MI (myocardial infarction), Obesity, and Steatosis of liver.  Associated symptoms: nausea, vomiting, denies chest pain, denies diarrhea, denies back pain, denies shortness of breath, denies fever, denies chills, denies headache and denies dizziness.  Normal bm yesterday. He wants to make sure he makes his cardiology appointment at 0800.          Review of patient's allergies indicates:  No Known Allergies  Past Medical History:   Diagnosis Date    Arthritis     Atrial fibrillation     BPH (benign prostatic hyperplasia)     Cardiac arrest     Coronary artery disease     Cyst, kidney, acquired     Diverticulosis     Hyperlipidemia     Hypertension     MI (myocardial infarction)     Obesity     Steatosis of liver      Past Surgical History:   Procedure Laterality Date    A-V CARDIAC PACEMAKER INSERTION Right     CARDIAC CATHETERIZATION      COLONOSCOPY  W/ BIOPSIES      excision of colon       Family History   Problem Relation Age of Onset    Hypertension Mother     Hypertension Father     Hypertension Sister      Social History     Tobacco Use    Smoking status: Former    Smokeless tobacco: Never   Substance Use Topics    Alcohol use: Not Currently    Drug use: Not Currently     Review of Systems   Constitutional:  Negative for fever.   HENT:  Negative for sore throat.    Respiratory:  Negative for shortness of breath.    Cardiovascular:  Negative for chest pain.   Gastrointestinal:  Positive for nausea and vomiting.   Genitourinary:  Negative for dysuria.   Musculoskeletal:  Negative for back pain.   Skin:  Negative for rash.   Neurological:  Negative for weakness.   Hematological:  Does not bruise/bleed easily.   All other systems reviewed and are negative.    Physical Exam     Initial Vitals [05/02/23 0553]   BP Pulse Resp Temp SpO2   (!) 138/96 64 18 97.5 °F (36.4 °C) 98 %      MAP       --         Physical Exam    Nursing note and vitals reviewed.  Constitutional: He appears well-developed and well-nourished.   HENT:   Head: Normocephalic and atraumatic.   Neck: Neck supple.   Normal range of motion.  Cardiovascular:  Normal rate, regular rhythm, normal heart sounds and intact distal pulses.           Pulmonary/Chest: Breath sounds normal.   Abdominal: Abdomen is soft. Bowel sounds are normal. There is no abdominal tenderness.   Musculoskeletal:         General: Normal range of motion.      Cervical back: Normal range of motion and neck supple.     Neurological: He is alert and oriented to person, place, and time. He has normal strength.   Skin: Skin is warm and dry.   Psychiatric: He has a normal mood and affect.       ED Course   Procedures  Labs Reviewed   COMPREHENSIVE METABOLIC PANEL - Abnormal; Notable for the following components:       Result Value    Potassium Level 3.4 (*)     Chloride 110 (*)     Carbon Dioxide 22 (*)     All other components  within normal limits   CBC WITH DIFFERENTIAL - Abnormal; Notable for the following components:    Hgb 13.9 (*)     Hct 41.1 (*)     IG# 0.05 (*)     All other components within normal limits   LIPASE - Normal   TROPONIN I - Normal   CBC W/ AUTO DIFFERENTIAL    Narrative:     The following orders were created for panel order CBC auto differential.  Procedure                               Abnormality         Status                     ---------                               -----------         ------                     CBC with Differential[[id number]]        Abnormal            Final result                 Please view results for these tests on the individual orders.   URINALYSIS, REFLEX TO URINE CULTURE   EXTRA TUBES    Narrative:     The following orders were created for panel order EXTRA TUBES.  Procedure                               Abnormality         Status                     ---------                               -----------         ------                     Light Blue Top Hold[[id number]]                              In process                 Lavender Top Hold[[id number]]                                In process                 Gold Top Hold[[id number]]                                    In process                   Please view results for these tests on the individual orders.   LIGHT BLUE TOP HOLD   LAVENDER TOP HOLD   GOLD TOP HOLD     EKG Readings: (Independently Interpreted)   Initial Reading: No STEMI. Rhythm: Paced Rhythm. Ectopy: No Ectopy. Conduction: Normal. Axis: Normal.   Reviewed by Dr Pompa (ER staff)   ECG Results              EKG 12-lead (Preliminary result)  Result time 05/02/23 06:59:01      Wet Read by Marcelo Pompa MD (05/02/23 06:59:01, [facility], Emergency Medicine)    Ventricularly paced rhythm at a rate of 62, no signs of ST elevation or depression, and indeterminate axis, prolonged QTC of 548                                  Imaging Results               X-Ray Chest AP Portable (Final result)  Result time 05/02/23 07:37:02      Final result by Jg Plunkett MD (05/02/23 07:37:02)                   Impression:      No acute cardiopulmonary process.      Electronically signed by: Jg Plunkett  Date:    05/02/2023  Time:    07:37               Narrative:    EXAMINATION:  XR CHEST AP PORTABLE    CLINICAL HISTORY:  Nausea with vomiting, unspecified    TECHNIQUE:  Single view of the chest    COMPARISON:  12/27/2022    FINDINGS:  No focal opacification, pleural effusion, or pneumothorax.    The cardiomediastinal silhouette is within normal limits.    Right-sided medical device is noted.    No acute osseous abnormality.                                       Medications - No data to display  Medical Decision Making:   History:   Old Records Summarized: records from clinic visits and records from previous admission(s).  Initial Assessment:   The patient came early to the hospital for a 0800 cardiology appointment , he was sitting in the lobby when he felt nauseated - went to the bathroom and vomited x1.  He reports that he feels fine at this time and requests something to drink. The onset was just prior to arrival.  The course/duration of symptoms is resolved.  The character of symptoms was nausea.  The degree at onset was minimal.  The Location of pain at onset was none.  The degree at present is none.  Radiating pain: none. There are exacerbating factors including none.  There are relieving factors including vomiting.  Therapy today: none.  Risk factors consist of past medical history of Arthritis, Atrial fibrillation, BPH (benign prostatic hyperplasia), Cardiac arrest, Coronary artery disease, Cyst, kidney, acquired, Diverticulosis, Hyperlipidemia, Hypertension, MI (myocardial infarction), Obesity, and Steatosis of liver.  Associated symptoms: nausea, vomiting, denies chest pain, denies diarrhea, denies back pain, denies shortness of breath, denies fever, denies  chills, denies headache and denies dizziness.  Normal bm yesterday. He wants to make sure he makes his cardiology appointment at 0800.    Independently Interpreted Test(s):   I have ordered and independently interpreted X-rays - see prior notes.  Clinical Tests:   Lab Tests: Ordered and Reviewed  Radiological Study: Ordered and Reviewed  Re-eval at 0730: patient has no complaints while in the ER, taking po fluids without difficulty, he will follow up at his cardiology appointment at 0800, strict return to ER instructions given    7:37 AM DISPOSITION: The patient is resting comfortably in no acute distress.  He is hemodynamically stable and is without objective evidence for acute process requiring urgent intervention or hospitalization. I provided counseling to patient with regard to condition, the treatment plan, specific conditions for return, and the importance of follow up. Detailed written and verbal instructions provided to patient and he expressed a verbal understanding of the discharge instructions and overall management plan. Reiterated the importance of medication administration and safety and advised patient to follow up with primary care provider in 3-5 days or sooner if needed.  Answered questions at this time. The patient is stable for discharge.              ED Course as of 05/02/23 0742   Tue May 02, 2023   0659 EKG is nonischemic [RK]      ED Course User Index  [RK] Marcelo Pompa MD                 Clinical Impression:   Final diagnoses:  [R11.2] Nausea and vomiting        ED Disposition Condition    Discharge Stable          ED Prescriptions    None       Follow-up Information       Follow up With Specialties Details Why Contact Info    follow up with your primary care provider in 3-5 days        [facility] Emergency Medicine   [address] [facility] [id number] [phone]    follow up at your scheduled cardiology appointment at 8am                  Matt Lyons, [facility]  05/02/23 0742

## 2023-05-02 NOTE — PROGRESS NOTES
[facility] CARDIOLOGY  OUTPATIENT OFFICE VISIT NOTE    SUBJECTIVE:      Chief Complaint: f/u denies chest pain or sob and was in ED this morning before device check today       HPI: Delio Daniel Jr. is a 67 y.o. yo male w/ PMH of  has a past medical history of Arthritis, Atrial fibrillation, BPH (benign prostatic hyperplasia), Cardiac arrest, Coronary artery disease, Cyst, kidney, acquired, Diverticulosis, Hyperlipidemia, Hypertension, MI (myocardial infarction), Obesity, and Steatosis of liver., who presents for  follow up    PMH: HTN, HLD, small carcinoid tumor s/p resection, CAD w hx of STEMI in 2003, paroxysmal Afib on xarelto, HFpEF (55%) and second degree AV block s/ PPM ([facility])11/13/17 (upgraded to dcICD 5/18 2/2 Vfib arrest in 3/18 in setting of prolonged QT and hypoK).    Today, returns for device interrogation. Continues to have episodes of SVT (up to 60), with what appears to be atrial fibrillation in the 300s. 32 non-sustained episodes. Has continued mode switching. Denies any symptoms of chest pain, palpitations, shortness of breath, increasing lower extremity edema.  Denies any shortness of breath with activity, however activity is limited to washing dishes/sweeping the floor. No other complaints at this time.    Past Medical History:   has a past medical history of Arthritis, Atrial fibrillation, BPH (benign prostatic hyperplasia), Cardiac arrest, Coronary artery disease, Cyst, kidney, acquired, Diverticulosis, Hyperlipidemia, Hypertension, MI (myocardial infarction), Obesity, and Steatosis of liver.     Past Surgical History:   has a past surgical history that includes Colonoscopy w/ biopsies; excision of colon; Cardiac catheterization; and A-V cardiac pacemaker insertion (Right).     Family History:  family history includes Hypertension in his father, mother, and sister.     Social History:   reports that he has quit smoking. He has never used smokeless tobacco. He reports that he does not  currently use alcohol. He reports that he does not currently use drugs.     Allergies:  has No Known Allergies.     Home Medications:  Prior to Admission medications    Medication Sig Start Date End Date Taking? Authorizing Provider   aspirin 81 MG Chew chew and swallow 1 tablet by mouth daily 10/21/22 10/21/23 Yes Magdi Rivera, DO   famotidine (PEPCID) 20 MG tablet Take 1 tablet (20 mg total) by mouth 2 (two) times daily. for 10 days 3/24/23 5/2/23 Yes Tani Hernandez Jr., FNP   LIDOcaine (LIDODERM) 5 % Place 1 patch onto the skin once daily. Remove & Discard patch within 12 hours or as directed by MD 3/24/23  Yes Marcelo Pompa MD   ondansetron (ZOFRAN-ODT) 4 MG TbDL Take 4 mg by mouth every 12 (twelve) hours as needed. 3/27/23  Yes Historical Provider   potassium chloride (K-TAB) 20 mEq Take 1 tablet (20 mEq total) by mouth 2 (two) times a day. 5/17/22  Yes Alfie Medina MD   vitamin D (VITAMIN D3) 1000 units Tab Take 1 tablet (1,000 Units total) by mouth once daily. 10/19/22  Yes Magdi Rivera, DO   XARELTO 20 mg Tab TAKE 1 TABLET BY MOUTH DAILY with SUPPER 2/9/23 2/9/24 Yes Marylu Brown DO   atorvastatin (LIPITOR) 40 MG tablet Take 1 tablet (40 mg total) by mouth once daily. 2/9/23 5/2/23 Yes Marylu Brown DO   diltiaZEM (CARDIZEM CD) 240 MG 24 hr capsule Take 1 capsule (240 mg total) by mouth once daily. 2/9/23 5/2/23 Yes Marylu Brown DO   metoprolol succinate (TOPROL-XL) 200 MG 24 hr tablet Take 1 tablet (200 mg total) by mouth 2 (two) times daily. 2/9/23 5/2/23 Yes Marylu Brown DO   albuterol (PROVENTIL/VENTOLIN HFA) 90 mcg/actuation inhaler Inhale 2 puffs into the lungs every 6 (six) hours as needed for Wheezing. Rescue 12/27/22 1/26/23  Eduardo Hernandez MD   atorvastatin (LIPITOR) 40 MG tablet Take 1 tablet (40 mg total) by mouth once daily. 5/2/23 10/29/23  Arun Norman DO   cetirizine (ZYRTEC) 10 MG tablet Take 1 tablet (10 mg total) by mouth once daily. for 14 days 12/27/22  1/10/23  Eduardo Hernandez MD   diltiaZEM (CARDIZEM CD) 360 MG 24 hr capsule Take 1 capsule (360 mg total) by mouth once daily. 5/2/23 10/29/23  Arun Norman DO   fluticasone propionate (FLONASE) 50 mcg/actuation nasal spray 2 sprays (100 mcg total) by Each Nostril route once daily.  Patient not taking: Reported on 5/2/2023 12/27/22   Eduardo Hernandez MD   GI cocktail antac/dicyc/lidoc Swish and swallow 30 mLs every 6 (six) hours as needed (abdominal pain). 3/19/23   Tani Neff MD   hyoscyamine (ANASPAZ,LEVSIN) 0.125 mg Tab Take 125 mcg by mouth every 6 (six) hours as needed.    Historical Provider   ibuprofen (ADVIL,MOTRIN) 600 MG tablet Take 1 tablet (600 mg total) by mouth every 6 (six) hours as needed for Pain.  Patient not taking: Reported on 5/2/2023 3/24/23   Marcelo Pompa MD   losartan (COZAAR) 50 MG tablet Take 1 tablet (50 mg total) by mouth once daily. 5/2/23 10/29/23  Arun Norman DO   metoprolol succinate (TOPROL-XL) 200 MG 24 hr tablet Take 1 tablet (200 mg total) by mouth 2 (two) times daily. 5/2/23 10/29/23  Arun Norman DO   NIFEdipine (PROCARDIA-XL) 30 MG (OSM) 24 hr tablet Take 30 mg by mouth Daily. 2/9/23   Historical Provider   omeprazole (PRILOSEC) 20 MG capsule Take 1 capsule (20 mg total) by mouth once daily. 10/19/22 11/18/22  Magdi Rivera,    promethazine (PHENERGAN) 25 MG tablet Take 1 tablet (25 mg total) by mouth every 6 (six) hours as needed for Nausea. 3/15/23   Eugenio Hart MD   spironolactone (ALDACTONE) 50 MG tablet Take 1 tablet (50 mg total) by mouth once daily. 5/2/23 10/29/23  Arun Norman DO   traMADoL (ULTRAM) 50 mg tablet Take 1 tablet (50 mg total) by mouth every 12 (twelve) hours as needed for Pain. 4/28/23 5/1/23  KAVIN Vences   losartan (COZAAR) 50 MG tablet Take 1 tablet (50 mg total) by mouth once daily. 2/9/23 5/2/23  Marylu Brown DO   spironolactone (ALDACTONE) 50 MG tablet Take 1 tablet (50 mg total) by mouth once daily.  "2/9/23 5/2/23  Marylu Brown DO       ROS:  Review of Systems   Constitutional:  Negative for chills and fever.   Respiratory:  Negative for cough and shortness of breath.    Cardiovascular:  Negative for chest pain, palpitations, orthopnea, leg swelling and PND.   Gastrointestinal:  Negative for abdominal pain, diarrhea, nausea and vomiting.   Musculoskeletal:  Positive for back pain.   Neurological:  Negative for dizziness and headaches.         OBJECTIVE:     Vital signs:   BP (!) 140/86 (BP Location: Left arm, Patient Position: Sitting, BP Method: X-Large (Manual))   Pulse 65   Temp 97.5 °F (36.4 °C) (Oral)   Resp 20   Ht 5' 9" (1.753 m)   Wt 101.5 kg (223 lb 12.8 oz)   SpO2 100%   BMI 33.05 kg/m²      Physical Examination:  General: Patient resting comfortably in chair, in no acute distress   Eye: PERRLA, EOMI, clear conjunctiva, eyelids normal  HENT: Head-normocephalic and atraumatic  Neck: full range of motion, no thyromegaly or lymphadenopathy, trachea midline, supple, no palpable thyroid nodules  Respiratory: clear to auscultation bilaterally without wheezes, rales, rhonchi  Cardiovascular: regular rate and rhythm without murmurs.  No gallops or rubs no JVD.  Capillary refill within normal limits. Currently not irregular rhythm  Gastrointestinal: soft, non-tender, non-distended with normal bowel sounds, without masses to palpation  Genitourinary: no CVA tenderness to palpation  Musculoskeletal: full range of motion of all extremities/spine without limitation or discomfort  Integumentary: no rashes or skin lesions present  Neurologic: no signs of peripheral neurological deficit, motor/sensory function intact  Psychiatric:  alert and oriented, cognitive function intact, cooperative with exam, good eye contact, judgement and insight intact, mood and affect full range.     Labs:  CMP:   Lab Results   Component Value Date    GLUCOSE 106 05/02/2023    CALCIUM 8.9 05/02/2023    ALBUMIN 3.7 05/02/2023    NA " 141 05/02/2023    K 3.4 (L) 05/02/2023    CO2 22 (L) 05/02/2023    BUN 20.7 05/02/2023    CREATININE 1.12 05/02/2023    ALKPHOS 65 05/02/2023    ALT 23 05/02/2023    AST 20 05/02/2023    BILITOT 1.1 05/02/2023      CBC:   Lab Results   Component Value Date    WBC 6.20 05/02/2023    HGB 13.9 (L) 05/02/2023    HCT 41.1 (L) 05/02/2023    MCV 86.7 05/02/2023    RDW 14.2 05/02/2023     FLP:   Lab Results   Component Value Date    CHOL 61 07/03/2021    HDL 18 (L) 07/03/2021    LDL 21.00 (L) 07/03/2021    TRIG 110 07/03/2021    TOTALCHOLEST 3 07/03/2021         ASSESSMENT & PLAN:     CAD, MI in 2003  -Denies any chest pain since last visit  -Continue atorvastatin 40, aspirin 81, and metoprolol succinate 200 mg BID  -METs 3.5, RCRI= 2    Persistent Afib  -Device check today 5/2/23, showed RV pacing at 34%  -Diltiazem last visit increased to 240 mg, with Toprol 200 mg BID  -Continues to have atrial rate of 300  -60 episodes of VT despite being nearly maximized on beta blocker and CCB for rate control  -Referral to Dr. Hernandez placed for AV steven ablation, will likely need BiV device upgrade    2nd degree AVB s/p PPM-->upgrade to ICD for secondary prevention 2/2 VFib arrest  -99% mode switch last visit 2/9/23  -RV paced 34%  -Battery life 2 years  -Routine check q3 months    Hypertension  -140/86 today  -Counseled on medication compliance  -Continue Losartan 50 mg qd, Toprol 200 mg BID, and aldactone 50 mg    HLD  -LDL 21  -Continue atorvastatin 40      Hypokalemia  -Had normal renin and aldosterone levels last visit  -Will need repeat levels to fully rule out primary hyperaldosteronism      Return to clinic in 3 month(s) with labs.    Arun Norman DO  [facility] Internal Medicine, PGY-1

## 2023-05-12 ENCOUNTER — HOSPITAL ENCOUNTER (EMERGENCY)
Facility: HOSPITAL | Age: 68
Discharge: HOME | End: 2023-05-12
Attending: EMERGENCY MEDICINE
Payer: MEDICARE

## 2023-05-12 VITALS
WEIGHT: 240 LBS | HEART RATE: 73 BPM | TEMPERATURE: 97 F | SYSTOLIC BLOOD PRESSURE: 162 MMHG | HEIGHT: 68 IN | BODY MASS INDEX: 36.37 KG/M2 | RESPIRATION RATE: 18 BRPM | DIASTOLIC BLOOD PRESSURE: 94 MMHG | OXYGEN SATURATION: 100 %

## 2023-05-12 DIAGNOSIS — M54.50 CHRONIC LEFT-SIDED LOW BACK PAIN WITHOUT SCIATICA: Primary | ICD-10-CM

## 2023-05-12 DIAGNOSIS — G89.29 CHRONIC LEFT-SIDED LOW BACK PAIN WITHOUT SCIATICA: Primary | ICD-10-CM

## 2023-05-12 PROCEDURE — 99283 EMERGENCY DEPT VISIT LOW MDM: CPT

## 2023-05-12 PROCEDURE — 25000003 PHARM REV CODE 250: Performed by: PHYSICIAN ASSISTANT

## 2023-05-12 RX ORDER — KETOROLAC TROMETHAMINE 10 MG/1
10 TABLET, FILM COATED ORAL
Status: COMPLETED | OUTPATIENT
Start: 2023-05-12 | End: 2023-05-12

## 2023-05-12 RX ORDER — TIZANIDINE 2 MG/1
2 TABLET ORAL EVERY 8 HOURS
Qty: 15 TABLET | Refills: 0 | Status: SHIPPED | OUTPATIENT
Start: 2023-05-12 | End: 2023-05-17

## 2023-05-12 RX ADMIN — KETOROLAC TROMETHAMINE 10 MG: 10 TABLET, FILM COATED ORAL at 06:05

## 2023-05-12 NOTE — ED PROVIDER NOTES
Encounter Date: 5/12/2023       History     Chief Complaint   Patient presents with    Back Pain     PT IN /AASI W CHRONIC LOWER BACK PAIN, WORSE X 3 HRS.  DENIES INJURY.      Delio Daniel Jr. is a 67 y.o. male who presents to the ED with complaints of chronic lower back pain that started today. He reports he stood up from his bed and his back started hurting. Denies dysuria, hematuria, fall, fever, chills.       The history is provided by the patient. No  was used.   Review of patient's allergies indicates:  No Known Allergies  Past Medical History:   Diagnosis Date    Arthritis     Atrial fibrillation     BPH (benign prostatic hyperplasia)     Cardiac arrest     Coronary artery disease     Cyst, kidney, acquired     Diverticulosis     Hyperlipidemia     Hypertension     MI (myocardial infarction)     Obesity     Steatosis of liver      Past Surgical History:   Procedure Laterality Date    A-V CARDIAC PACEMAKER INSERTION Right     CARDIAC CATHETERIZATION      COLONOSCOPY W/ BIOPSIES      excision of colon       Family History   Problem Relation Age of Onset    Hypertension Mother     Hypertension Father     Hypertension Sister      Social History     Tobacco Use    Smoking status: Former    Smokeless tobacco: Never   Substance Use Topics    Alcohol use: Not Currently    Drug use: Not Currently     Review of Systems   Constitutional:  Negative for chills, fatigue and fever.   HENT:  Negative for congestion, ear pain, sinus pain and sore throat.    Eyes:  Negative for pain.   Respiratory:  Negative for cough, chest tightness and shortness of breath.    Cardiovascular:  Negative for chest pain.   Gastrointestinal:  Negative for abdominal pain, constipation, diarrhea, nausea and vomiting.   Genitourinary:  Negative for dysuria.   Musculoskeletal:  Positive for back pain. Negative for joint swelling.   Skin:  Negative for color change and rash.   Neurological:  Negative for dizziness and weakness.    Psychiatric/Behavioral:  Negative for behavioral problems and confusion.      Physical Exam     Initial Vitals [05/12/23 1734]   BP Pulse Resp Temp SpO2   (!) 166/102 71 16 97 °F (36.1 °C) 100 %      MAP       --         Physical Exam    Nursing note and vitals reviewed.  Constitutional: He appears well-developed and well-nourished.   HENT:   Head: Normocephalic and atraumatic.   Eyes: EOM are normal. Pupils are equal, round, and reactive to light.   Neck: Neck supple.   Normal range of motion.  Cardiovascular:  Normal rate, regular rhythm, normal heart sounds and intact distal pulses.           No murmur heard.  Pulmonary/Chest: Breath sounds normal. No respiratory distress. He has no wheezes. He has no rhonchi. He has no rales.   Abdominal: Abdomen is soft. Bowel sounds are normal. There is no abdominal tenderness.   Musculoskeletal:         General: Tenderness present.      Cervical back: Normal range of motion and neck supple.      Lumbar back: Tenderness (L lower paraspinal muscle tenderness with spasm) present. Normal range of motion. Negative right straight leg raise test and negative left straight leg raise test.     Neurological: He is alert and oriented to person, place, and time.   Skin: Skin is warm. Capillary refill takes less than 2 seconds.   Psychiatric: He has a normal mood and affect. Thought content normal.       ED Course   Procedures  Labs Reviewed - No data to display       Imaging Results    None          Medications   ketorolac tablet 10 mg (has no administration in time range)                              Clinical Impression:   Final diagnoses:  [M54.50, G89.29] Chronic left-sided low back pain without sciatica (Primary)        ED Disposition Condition    Discharge Stable          ED Prescriptions       Medication Sig Dispense Start Date End Date Auth. Provider    tiZANidine (ZANAFLEX) 2 MG tablet Take 1 tablet (2 mg total) by mouth every 8 (eight) hours. for 5 days 15 tablet 5/12/2023  5/17/2023 Tia Lopez PA-C          Follow-up Information       Follow up With Specialties Details Why Contact Info    [facility]  In 1 week  [address] [id number]    [facility] Emergency Medicine In 3 days As needed, If symptoms worsen [address] [address] [phone]             Tia Lopez PA-C  05/12/23 1845

## 2023-05-21 ENCOUNTER — HOSPITAL ENCOUNTER (EMERGENCY)
Facility: HOSPITAL | Age: 68
Discharge: HOME | End: 2023-05-21
Attending: EMERGENCY MEDICINE
Payer: MEDICARE

## 2023-05-21 VITALS
BODY MASS INDEX: 36.37 KG/M2 | RESPIRATION RATE: 16 BRPM | TEMPERATURE: 98 F | OXYGEN SATURATION: 99 % | SYSTOLIC BLOOD PRESSURE: 151 MMHG | HEART RATE: 77 BPM | DIASTOLIC BLOOD PRESSURE: 99 MMHG | WEIGHT: 240 LBS | HEIGHT: 68 IN

## 2023-05-21 DIAGNOSIS — M54.42 CHRONIC LEFT-SIDED LOW BACK PAIN WITH LEFT-SIDED SCIATICA: Primary | ICD-10-CM

## 2023-05-21 DIAGNOSIS — G89.29 CHRONIC LEFT-SIDED LOW BACK PAIN WITH LEFT-SIDED SCIATICA: Primary | ICD-10-CM

## 2023-05-21 PROCEDURE — 99283 EMERGENCY DEPT VISIT LOW MDM: CPT

## 2023-05-21 PROCEDURE — 25000003 PHARM REV CODE 250: Performed by: NURSE PRACTITIONER

## 2023-05-21 RX ORDER — TRAMADOL HYDROCHLORIDE 50 MG/1
50 TABLET ORAL
Status: COMPLETED | OUTPATIENT
Start: 2023-05-21 | End: 2023-05-21

## 2023-05-21 RX ORDER — TIZANIDINE 4 MG/1
4 TABLET ORAL EVERY 6 HOURS PRN
Qty: 20 TABLET | Refills: 0 | Status: SHIPPED | OUTPATIENT
Start: 2023-05-21 | End: 2023-05-31

## 2023-05-21 RX ADMIN — TRAMADOL HYDROCHLORIDE 50 MG: 50 TABLET, COATED ORAL at 05:05

## 2023-05-21 NOTE — ED PROVIDER NOTES
Encounter Date: 5/21/2023       History     Chief Complaint   Patient presents with    Back Pain     Back pain that started a few hours ago while laying down. Denies any trauma.      The patient presents with low back pain.  The onset was chronic increased today.  The course/duration of symptoms is constant.  Type of injury: none and none recent, denies falls.  Location: left lumbar. Radiating pain: left lower extremity. The character of symptoms is dull.  The degree at onset was moderate.  The degree at present is moderate.  There are exacerbating factors including movement and changing position.  The relieving factor is rest.  Risk factors consist of none.  Prior episodes: chronic.  Therapy today: none.  Associated symptoms: none, denies bowel dysfunction, denies bladder dysfunction, denies altered sensation, denies focal weakness, denies saddle numbness, denies abdominal pain and denies dysuria.        Review of patient's allergies indicates:  No Known Allergies  Past Medical History:   Diagnosis Date    Arthritis     Atrial fibrillation     BPH (benign prostatic hyperplasia)     Cardiac arrest     Coronary artery disease     Cyst, kidney, acquired     Diverticulosis     Hyperlipidemia     Hypertension     MI (myocardial infarction)     Obesity     Steatosis of liver      Past Surgical History:   Procedure Laterality Date    A-V CARDIAC PACEMAKER INSERTION Right     CARDIAC CATHETERIZATION      COLONOSCOPY W/ BIOPSIES      excision of colon       Family History   Problem Relation Age of Onset    Hypertension Mother     Hypertension Father     Hypertension Sister      Social History     Tobacco Use    Smoking status: Former    Smokeless tobacco: Never   Substance Use Topics    Alcohol use: Not Currently    Drug use: Not Currently     Review of Systems   Constitutional:  Negative for fever.   HENT:  Negative for sore throat.    Respiratory:  Negative for shortness of breath.    Cardiovascular:  Negative for chest  pain.   Gastrointestinal:  Negative for nausea.   Genitourinary:  Negative for dysuria.   Musculoskeletal:  Positive for back pain.   Skin:  Negative for rash.   Neurological:  Negative for weakness.   Hematological:  Does not bruise/bleed easily.   All other systems reviewed and are negative.    Physical Exam     Initial Vitals [05/21/23 1723]   BP Pulse Resp Temp SpO2   (!) 151/99 77 16 98.1 °F (36.7 °C) 99 %      MAP       --         Physical Exam    Nursing note and vitals reviewed.  Constitutional: He appears well-developed and well-nourished.   HENT:   Head: Normocephalic and atraumatic.   Neck: Neck supple.   Normal range of motion.  Cardiovascular:  Normal rate, regular rhythm, normal heart sounds and intact distal pulses.           Pulmonary/Chest: Breath sounds normal.   Abdominal: Abdomen is soft. Bowel sounds are normal.   Musculoskeletal:         General: Normal range of motion.      Cervical back: Normal range of motion and neck supple.      Comments: No costovertebral angle tenderness, , Thoracic: no vertebral point tenderness, Lumbar: left lateral mild tenderness, midline negative, no vertebral point tenderness, Sacral: no vertebral point tenderness, Testing: Straight leg raising, supine negative.  No C/T/L point tenderness. normal reflexes.       Neurological: He is alert and oriented to person, place, and time. He has normal strength.   Skin: Skin is warm and dry.   Psychiatric: He has a normal mood and affect.       ED Course   Procedures  Labs Reviewed - No data to display       Imaging Results    None          Medications   traMADoL tablet 50 mg (has no administration in time range)     Medical Decision Making:   History:   Old Records Summarized: records from clinic visits and records from previous admission(s).  Initial Assessment:   The patient presents with low back pain.  The onset was chronic increased today.  The course/duration of symptoms is constant.  Type of injury: none and none  recent, denies falls.  Location: left lumbar. Radiating pain: left lower extremity. The character of symptoms is dull.  The degree at onset was moderate.  The degree at present is moderate.  There are exacerbating factors including movement and changing position.  The relieving factor is rest.  Risk factors consist of none.  Prior episodes: chronic.  Therapy today: none.  Associated symptoms: none, denies bowel dysfunction, denies bladder dysfunction, denies altered sensation, denies focal weakness, denies saddle numbness, denies abdominal pain and denies dysuria.        Differential Diagnosis:   Lumbar fracture, spinal stenosis, epidural abscess, spine osteomyelitis, MS, cauda equina, among others   5:43 PM DISPOSITION: The patient is resting comfortably in no acute distress.  He is hemodynamically stable and is without objective evidence for acute process requiring urgent intervention or hospitalization. I provided counseling to patient with regard to condition, the treatment plan, specific conditions for return, and the importance of follow up. Detailed written and verbal instructions provided to patient and he expressed a verbal understanding of the discharge instructions and overall management plan. Reiterated the importance of medication administration and safety and advised patient to follow up with primary care provider in 3-5 days or sooner if needed.  Answered questions at this time. The patient is stable for discharge.                           Clinical Impression:   Final diagnoses:  [M54.42, G89.29] Chronic left-sided low back pain with left-sided sciatica (Primary)        ED Disposition Condition    Discharge Stable          ED Prescriptions       Medication Sig Dispense Start Date End Date Auth. Provider    tiZANidine (ZANAFLEX) 4 MG tablet Take 1 tablet (4 mg total) by mouth every 6 (six) hours as needed (pain or spasms). May cause drowsiness 20 tablet 5/21/2023 5/31/2023 PEYTON VargasP           Follow-up Information       Follow up With Specialties Details Why Contact Info    follow up with your primary care provider in 3-5 days        [facility] Emergency Medicine  If symptoms worsen [address] [facility] [address] [phone]             Matt Lyons, MARISELA  05/21/23 7281

## 2023-05-23 ENCOUNTER — HOSPITAL ENCOUNTER (EMERGENCY)
Facility: HOSPITAL | Age: 68
Discharge: HOME | End: 2023-05-23
Attending: STUDENT IN AN ORGANIZED HEALTH CARE EDUCATION/TRAINING PROGRAM
Payer: MEDICARE

## 2023-05-23 VITALS
OXYGEN SATURATION: 98 % | HEART RATE: 60 BPM | DIASTOLIC BLOOD PRESSURE: 91 MMHG | RESPIRATION RATE: 16 BRPM | SYSTOLIC BLOOD PRESSURE: 130 MMHG | WEIGHT: 231.5 LBS | TEMPERATURE: 96 F | BODY MASS INDEX: 34.29 KG/M2 | HEIGHT: 69 IN

## 2023-05-23 DIAGNOSIS — R53.1 WEAKNESS: ICD-10-CM

## 2023-05-23 LAB
ALBUMIN SERPL-MCNC: 3.8 G/DL (ref 3.4–4.8)
ALBUMIN/GLOB SERPL: 1.2 RATIO (ref 1.1–2)
ALP SERPL-CCNC: 61 UNIT/L (ref 40–150)
ALT SERPL-CCNC: 18 UNIT/L (ref 0–55)
APPEARANCE UR: CLEAR
AST SERPL-CCNC: 19 UNIT/L (ref 5–34)
BACTERIA #/AREA URNS AUTO: ABNORMAL /HPF
BASOPHILS # BLD AUTO: 0.06 X10(3)/MCL
BASOPHILS NFR BLD AUTO: 1 %
BILIRUB UR QL STRIP.AUTO: NEGATIVE MG/DL
BILIRUBIN DIRECT+TOT PNL SERPL-MCNC: 1.3 MG/DL
BUN SERPL-MCNC: 20.2 MG/DL (ref 8.4–25.7)
CALCIUM SERPL-MCNC: 9.1 MG/DL (ref 8.8–10)
CHLORIDE SERPL-SCNC: 108 MMOL/L (ref 98–107)
CO2 SERPL-SCNC: 21 MMOL/L (ref 23–31)
COLOR UR: YELLOW
CREAT SERPL-MCNC: 1.28 MG/DL (ref 0.73–1.18)
EOSINOPHIL # BLD AUTO: 0.1 X10(3)/MCL (ref 0–0.9)
EOSINOPHIL NFR BLD AUTO: 1.7 %
ERYTHROCYTE [DISTWIDTH] IN BLOOD BY AUTOMATED COUNT: 14.4 % (ref 11.5–17)
GFR SERPLBLD CREATININE-BSD FMLA CKD-EPI: >60 MLS/MIN/1.73/M2
GLOBULIN SER-MCNC: 3.1 GM/DL (ref 2.4–3.5)
GLUCOSE SERPL-MCNC: 88 MG/DL (ref 82–115)
GLUCOSE UR QL STRIP.AUTO: NORMAL MG/DL
HCT VFR BLD AUTO: 40.2 % (ref 42–52)
HGB BLD-MCNC: 13.6 G/DL (ref 14–18)
HYALINE CASTS #/AREA URNS LPF: ABNORMAL /LPF
IMM GRANULOCYTES # BLD AUTO: 0.03 X10(3)/MCL (ref 0–0.04)
IMM GRANULOCYTES NFR BLD AUTO: 0.5 %
KETONES UR QL STRIP.AUTO: NEGATIVE MG/DL
LEUKOCYTE ESTERASE UR QL STRIP.AUTO: 500 UNIT/L
LYMPHOCYTES # BLD AUTO: 2.1 X10(3)/MCL (ref 0.6–4.6)
LYMPHOCYTES NFR BLD AUTO: 35.5 %
MCH RBC QN AUTO: 29.3 PG (ref 27–31)
MCHC RBC AUTO-ENTMCNC: 33.8 G/DL (ref 33–36)
MCV RBC AUTO: 86.6 FL (ref 80–94)
MONOCYTES # BLD AUTO: 0.72 X10(3)/MCL (ref 0.1–1.3)
MONOCYTES NFR BLD AUTO: 12.2 %
MUCOUS THREADS URNS QL MICRO: ABNORMAL /LPF
NEUTROPHILS # BLD AUTO: 2.91 X10(3)/MCL (ref 2.1–9.2)
NEUTROPHILS NFR BLD AUTO: 49.1 %
NITRITE UR QL STRIP.AUTO: NEGATIVE
NRBC BLD AUTO-RTO: 0 %
PH UR STRIP.AUTO: 6 [PH]
PLATELET # BLD AUTO: 211 X10(3)/MCL (ref 130–400)
PMV BLD AUTO: 10.5 FL (ref 7.4–10.4)
POCT GLUCOSE: 85 MG/DL (ref 70–110)
POTASSIUM SERPL-SCNC: 3.3 MMOL/L (ref 3.5–5.1)
PROT SERPL-MCNC: 6.9 GM/DL (ref 5.8–7.6)
PROT UR QL STRIP.AUTO: ABNORMAL MG/DL
RBC # BLD AUTO: 4.64 X10(6)/MCL (ref 4.7–6.1)
RBC #/AREA URNS AUTO: ABNORMAL /HPF
RBC UR QL AUTO: ABNORMAL UNIT/L
SODIUM SERPL-SCNC: 137 MMOL/L (ref 136–145)
SP GR UR STRIP.AUTO: 1.03
SQUAMOUS #/AREA URNS LPF: ABNORMAL /HPF
TROPONIN I SERPL-MCNC: 0.01 NG/ML (ref 0–0.04)
TROPONIN I SERPL-MCNC: <0.01 NG/ML (ref 0–0.04)
TSH SERPL-ACNC: 2.81 UIU/ML (ref 0.35–4.94)
UROBILINOGEN UR STRIP-ACNC: NORMAL MG/DL
WBC # SPEC AUTO: 5.92 X10(3)/MCL (ref 4.5–11.5)
WBC #/AREA URNS AUTO: ABNORMAL /HPF

## 2023-05-23 PROCEDURE — 82962 GLUCOSE BLOOD TEST: CPT

## 2023-05-23 PROCEDURE — 84443 ASSAY THYROID STIM HORMONE: CPT | Performed by: PHYSICIAN ASSISTANT

## 2023-05-23 PROCEDURE — 84484 ASSAY OF TROPONIN QUANT: CPT | Performed by: PHYSICIAN ASSISTANT

## 2023-05-23 PROCEDURE — 85025 COMPLETE CBC W/AUTO DIFF WBC: CPT | Performed by: PHYSICIAN ASSISTANT

## 2023-05-23 PROCEDURE — 93005 ELECTROCARDIOGRAM TRACING: CPT

## 2023-05-23 PROCEDURE — 80053 COMPREHEN METABOLIC PANEL: CPT | Performed by: PHYSICIAN ASSISTANT

## 2023-05-23 PROCEDURE — 85610 PROTHROMBIN TIME: CPT | Performed by: PHYSICIAN ASSISTANT

## 2023-05-23 PROCEDURE — 87088 URINE BACTERIA CULTURE: CPT | Performed by: PHYSICIAN ASSISTANT

## 2023-05-23 PROCEDURE — 81001 URINALYSIS AUTO W/SCOPE: CPT | Performed by: PHYSICIAN ASSISTANT

## 2023-05-23 PROCEDURE — 99285 EMERGENCY DEPT VISIT HI MDM: CPT | Mod: 25

## 2023-05-23 NOTE — ED PROVIDER NOTES
Encounter Date: 5/23/2023       History     Chief Complaint   Patient presents with    Fatigue     Pt reports becoming weak while visiting hosp. PTA.  DENIES CP/SOB.  CBG 85. VSS. EKG obtained.      Patient reports to the Emergency Room after feeling weak/lightheaded earlier today while in the hospital cafeteria; patient reports he has not eaten this morning due to a lack of money; pt denies cp/sob/headache    The history is provided by the patient.   Fatigue  This is a new problem. The current episode started 1 to 2 hours ago. The problem has not changed since onset.Pertinent negatives include no chest pain, no abdominal pain, no headaches and no shortness of breath. Nothing aggravates the symptoms. He has tried nothing for the symptoms.   Review of patient's allergies indicates:  No Known Allergies  Past Medical History:   Diagnosis Date    Arthritis     Atrial fibrillation     BPH (benign prostatic hyperplasia)     Cardiac arrest     Coronary artery disease     Cyst, kidney, acquired     Diverticulosis     Hyperlipidemia     Hypertension     MI (myocardial infarction)     Obesity     Steatosis of liver      Past Surgical History:   Procedure Laterality Date    A-V CARDIAC PACEMAKER INSERTION Right     CARDIAC CATHETERIZATION      COLONOSCOPY W/ BIOPSIES      excision of colon       Family History   Problem Relation Age of Onset    Hypertension Mother     Hypertension Father     Hypertension Sister      Social History     Tobacco Use    Smoking status: Former    Smokeless tobacco: Never   Substance Use Topics    Alcohol use: Not Currently    Drug use: Not Currently     Review of Systems   Constitutional:  Positive for fatigue. Negative for fever.   HENT:  Negative for sore throat.    Respiratory:  Negative for shortness of breath.    Cardiovascular:  Negative for chest pain.   Gastrointestinal:  Negative for abdominal pain and nausea.   Genitourinary:  Negative for dysuria.   Musculoskeletal:  Negative for back  pain.   Skin:  Negative for rash.   Neurological:  Negative for weakness and headaches.   Hematological:  Does not bruise/bleed easily.   Psychiatric/Behavioral: Negative.       Physical Exam     Initial Vitals [05/23/23 0901]   BP Pulse Resp Temp SpO2   122/86 85 16 96.4 °F (35.8 °C) 98 %      MAP       --         Physical Exam    Vitals reviewed.  Constitutional: He appears well-developed and well-nourished.   HENT:   Head: Normocephalic and atraumatic.   Eyes: Conjunctivae and EOM are normal. Pupils are equal, round, and reactive to light.   Neck:   Normal range of motion.  Cardiovascular:  Normal rate, regular rhythm, normal heart sounds and intact distal pulses.           Pulmonary/Chest: Breath sounds normal. No respiratory distress. He has no wheezes. He exhibits no tenderness.   Abdominal: Abdomen is soft. Bowel sounds are normal. He exhibits no distension. There is no abdominal tenderness. There is no rebound and no guarding.   Musculoskeletal:         General: Normal range of motion.      Cervical back: Normal range of motion.     Neurological: He is alert and oriented to person, place, and time. He displays normal reflexes. No cranial nerve deficit or sensory deficit. GCS score is 15. GCS eye subscore is 4. GCS verbal subscore is 5. GCS motor subscore is 6.   Skin: Skin is warm. No pallor.   Psychiatric: He has a normal mood and affect. His behavior is normal. Judgment and thought content normal.       ED Course   Procedures  Labs Reviewed   COMPREHENSIVE METABOLIC PANEL - Abnormal; Notable for the following components:       Result Value    Potassium Level 3.3 (*)     Chloride 108 (*)     Carbon Dioxide 21 (*)     Creatinine 1.28 (*)     All other components within normal limits   PROTIME-INR - Abnormal; Notable for the following components:    INR 1.92 (*)     All other components within normal limits   URINALYSIS, REFLEX TO URINE CULTURE - Abnormal; Notable for the following components:    Protein, UA  1+ (*)     Blood, UA 2+ (*)     Leukocyte Esterase,  (*)     WBC, UA 21-50 (*)     Bacteria, UA Trace (*)     Squamous Epithelial Cells, UA Few (*)     Mucous, UA Trace (*)     RBC, UA 6-10 (*)     All other components within normal limits   CBC WITH DIFFERENTIAL - Abnormal; Notable for the following components:    RBC 4.64 (*)     Hgb 13.6 (*)     Hct 40.2 (*)     MPV 10.5 (*)     All other components within normal limits   TSH - Normal   TROPONIN I - Normal   TROPONIN I - Normal   CULTURE, URINE   CBC W/ AUTO DIFFERENTIAL    Narrative:     The following orders were created for panel order CBC auto differential.  Procedure                               Abnormality         Status                     ---------                               -----------         ------                     CBC with Differential[[id number]]        Abnormal            Final result                 Please view results for these tests on the individual orders.   EXTRA TUBES    Narrative:     The following orders were created for panel order EXTRA TUBES.  Procedure                               Abnormality         Status                     ---------                               -----------         ------                     Gold Top Hold[[id number]]                                    In process                 Pink Top Hold[[id number]]                                    In process                   Please view results for these tests on the individual orders.   GOLD TOP HOLD   PINK TOP HOLD   EXTRA TUBES    Narrative:     The following orders were created for panel order EXTRA TUBES.  Procedure                               Abnormality         Status                     ---------                               -----------         ------                     Light Blue Top Hold[[id number]]                              In process                 Lavender Top Hold[[id number]]                                In process                   Please  view results for these tests on the individual orders.   LIGHT BLUE TOP HOLD   LAVENDER TOP HOLD        ECG Results              EKG 12-lead (Weakness) Age > 50 (Final result)  Result time 05/23/23 12:58:05      Final result by Interface, Lab In [facility] (05/23/23 12:58:05)                   Narrative:    Test Reason : R53.1,    Vent. Rate : 063 BPM     Atrial Rate : 066 BPM     P-R Int : 000 ms          QRS Dur : 178 ms      QT Int : 522 ms       P-R-T Axes : 000 223 039 degrees     QTc Int : 534 ms    Ventricular-paced rhythm  A-fib  Abnormal ECG  Confirmed by Brodie Donovan MD ([id number]) on 5/23/2023 12:57:55 PM    Referred By: [email or web address]   SELF           Confirmed By:Brodie Donovan MD                                  Imaging Results              CT Head Without Contrast (Final result)  Result time 05/23/23 10:26:40      Final result by Sami Taylor MD (05/23/23 10:26:40)                   Impression:      1.  No acute intracranial findings identified.    2.  Chronic microangiopathic ischemia and atrophy.      Electronically signed by: Sami Taylor  Date:    05/23/2023  Time:    10:26               Narrative:    EXAMINATION:  CT HEAD WITHOUT CONTRAST    CLINICAL HISTORY:  lightheaded/dizzy;    TECHNIQUE:  Sequential axial images were performed of the brain without contrast.    Dose product length of 2592 mGycm. Automated exposure control was utilized to minimize radiation dose.    COMPARISON:  February 2, 2020.    FINDINGS:  There is no intracranial mass effect, midline shift, hydrocephalus or hemorrhage. There is no sulcal effacement or low attenuation changes to suggest recent large vessel territory infarction. Chronic about similar appearing periventricular and subcortical white matter low attenuation changes are present and are consistent with chronic microangiopathic ischemia. The ventricular system and sulcal markings prominence is consistent with atrophy. There is no acute extra axial fluid collection. Visualized  paranasal sinuses are clear without mucosal thickening, polypoidal abnormality or air-fluid levels.  Bilateral chronic similar loss of pneumatization of the mastoid air cells with opacification and sclerosis.                                       X-Ray Chest 1 View (Final result)  Result time 05/23/23 10:14:18      Final result by Sami Taylor MD (05/23/23 10:14:18)                   Impression:      NO ACUTE CARDIOPULMONARY PROCESS IDENTIFIED.      Electronically signed by: Sami Taylor  Date:    05/23/2023  Time:    10:14               Narrative:    EXAMINATION:  XR CHEST 1 VIEW    CLINICAL HISTORY:  weakness;    TECHNIQUE:  One view    COMPARISON:  May 2, 2023.    FINDINGS:  Cardiopericardial silhouette enlarged appearance similar.  Right chest implanted cardiac device electrodes terminate within the right atrium right ventricle.  Lungs are without dense focal or segmental consolidation, congestive process, pleural effusions or pneumothorax.                                       Medications - No data to display        APC / Resident Notes:   I was not physically present during the history, exam or disposition of this patient.  I was available all times for constipation. (Zmora)         ED Course as of 05/23/23 1522   Tue May 23, 2023   1154 Patient reports he is feeling better at this time [AL]      ED Course User Index  [AL] KAVIN Vences                 Clinical Impression:   Final diagnoses:  [R53.1] Weakness        ED Disposition Condition    Discharge Stable          ED Prescriptions    None       Follow-up Information       Follow up With Specialties Details Why Contact Info    discharge followup    If your symptoms become WORSE or you DO NOT IMPROVE and you are unable to reach your health care provider, you should RETURN to the emergency department    discharge info    Discussed all pertinent ED information, results, diagnosis and treatment plan; All questions and concerns were addressed at this  time. Patient voices understanding of information and instructions. Patient is comfortable with plan and discharge             KAVIN Vences  05/23/23 3007       Humberto Sewell MD  05/23/23 4462

## 2023-05-23 NOTE — FIRST PROVIDER EVALUATION
"Medical screening examination initiated.  I have conducted a focused provider triage encounter, findings are as follows:    Brief history of present illness:  Delio Daniel Jr. Is a 67 y.o. male who presents to the ED complaining of generalized weakness. Patient came to [facility] to try to get appointment in IM clinic next Monday rescheduled when he started to feel weak and lightheaded.     Vitals:    05/23/23 0901   BP: 122/86   BP Location: Left arm   Patient Position: Sitting   Pulse: 85   Resp: 16   Temp: 96.4 °F (35.8 °C)   TempSrc: Tympanic   SpO2: 98%   Weight: 105 kg (231 lb 7.7 oz)   Height: 5' 9" (1.753 m)       Pertinent physical exam:  ambulating independently. Strength and coordination intact.     POCT glucose 85    Preliminary workup initiated; this workup will be continued and followed by the physician or advanced practice provider that is assigned to the patient when roomed.  "

## 2023-05-25 LAB — BACTERIA UR CULT: NORMAL

## 2023-05-27 ENCOUNTER — HOSPITAL ENCOUNTER (EMERGENCY)
Facility: HOSPITAL | Age: 68
Discharge: HOME | End: 2023-05-27
Attending: FAMILY MEDICINE
Payer: MEDICARE

## 2023-05-27 VITALS
TEMPERATURE: 98 F | OXYGEN SATURATION: 98 % | WEIGHT: 232 LBS | HEIGHT: 69 IN | HEART RATE: 81 BPM | SYSTOLIC BLOOD PRESSURE: 158 MMHG | DIASTOLIC BLOOD PRESSURE: 97 MMHG | RESPIRATION RATE: 18 BRPM | BODY MASS INDEX: 34.36 KG/M2

## 2023-05-27 DIAGNOSIS — G89.29 CHRONIC BILATERAL LOW BACK PAIN WITHOUT SCIATICA: Primary | ICD-10-CM

## 2023-05-27 DIAGNOSIS — M54.50 CHRONIC BILATERAL LOW BACK PAIN WITHOUT SCIATICA: Primary | ICD-10-CM

## 2023-05-27 PROCEDURE — 99283 EMERGENCY DEPT VISIT LOW MDM: CPT

## 2023-05-27 PROCEDURE — 25000003 PHARM REV CODE 250: Performed by: NURSE PRACTITIONER

## 2023-05-27 RX ORDER — METHOCARBAMOL 500 MG/1
500 TABLET, FILM COATED ORAL
Status: COMPLETED | OUTPATIENT
Start: 2023-05-27 | End: 2023-05-27

## 2023-05-27 RX ADMIN — METHOCARBAMOL 500 MG: 500 TABLET ORAL at 07:05

## 2023-05-28 NOTE — ED PROVIDER NOTES
"Encounter Date: 5/27/2023       History     Chief Complaint   Patient presents with    Back Pain     Pt arrives via [facility] with c/o nontraumatic back pain that started prior to arrival     Pt is a 67 y.o. male who presents to the [facility] ED complaining of back pain. Pt with Hx of chronic pain, Reports already having medication for issue at home but did not take it prior to call the ambulance to come to the ED. Symptoms present for "about an hour or two" per pt. Denies injury to back, chest pain, SOB, weakness, dizziness, fever, or loss of bowel or bladder control. Hx of A fib, BPH, CAD, Diverticulosis, HTN, and MI.    Review of patient's allergies indicates:  No Known Allergies  Past Medical History:   Diagnosis Date    Arthritis     Atrial fibrillation     BPH (benign prostatic hyperplasia)     Cardiac arrest     Coronary artery disease     Cyst, kidney, acquired     Diverticulosis     Hyperlipidemia     Hypertension     MI (myocardial infarction)     Obesity     Steatosis of liver      Past Surgical History:   Procedure Laterality Date    A-V CARDIAC PACEMAKER INSERTION Right     CARDIAC CATHETERIZATION      COLONOSCOPY W/ BIOPSIES      excision of colon       Family History   Problem Relation Age of Onset    Hypertension Mother     Hypertension Father     Hypertension Sister      Social History     Tobacco Use    Smoking status: Former    Smokeless tobacco: Never   Substance Use Topics    Alcohol use: Not Currently    Drug use: Not Currently     Review of Systems   Constitutional:  Negative for chills, diaphoresis, fatigue and fever.   HENT:  Negative for facial swelling, postnasal drip, rhinorrhea, sinus pressure, sinus pain, sore throat and trouble swallowing.    Respiratory:  Negative for cough, chest tightness, shortness of breath and wheezing.    Cardiovascular:  Negative for chest pain, palpitations and leg swelling.   Gastrointestinal:  Negative for abdominal pain, diarrhea, nausea and vomiting. "   Genitourinary:  Negative for dysuria, flank pain, hematuria and urgency.   Musculoskeletal:  Positive for back pain. Negative for arthralgias and myalgias.   Skin:  Negative for color change and rash.   Neurological:  Negative for dizziness, syncope, weakness and headaches.   Hematological:  Does not bruise/bleed easily.   All other systems reviewed and are negative.    Physical Exam     Initial Vitals [05/27/23 1932]   BP Pulse Resp Temp SpO2   (!) 134/103 75 18 97.9 °F (36.6 °C) 98 %      MAP       --         Physical Exam    Nursing note and vitals reviewed.  Constitutional: Vital signs are normal. He appears well-developed and well-nourished.   HENT:   Head: Normocephalic.   Nose: Nose normal.   Mouth/Throat: Oropharynx is clear and moist.   Eyes: Conjunctivae and EOM are normal. Pupils are equal, round, and reactive to light.   Neck: Neck supple.   Normal range of motion.  Cardiovascular:  Normal rate, regular rhythm, normal heart sounds and intact distal pulses.           Pulmonary/Chest: Effort normal and breath sounds normal. No respiratory distress. He has no wheezes. He has no rhonchi. He has no rales. He exhibits no tenderness.   Abdominal: Abdomen is soft and flat. Bowel sounds are normal. There is no abdominal tenderness. There is no rebound, no guarding, no tenderness at McBurney's point and negative Lake's sign.   Musculoskeletal:         General: Normal range of motion.      Cervical back: Normal range of motion and neck supple.      Lumbar back: Spasms and tenderness present. No swelling, edema, deformity or signs of trauma. Normal range of motion.        Back:      Neurological: He is alert and oriented to person, place, and time. He has normal strength.   Skin: Skin is warm and dry. Capillary refill takes less than 2 seconds.   Psychiatric: He has a normal mood and affect. His behavior is normal. Judgment and thought content normal.       ED Course   Procedures  Labs Reviewed - No data to  display       Imaging Results    None          Medications   methocarbamoL tablet 500 mg (has no administration in time range)     Medical Decision Making:   Differential Diagnosis:   Chronic back pain  ED Management:  7:43 PM Reassessed patient at this time. Discussed with patient all pertinent ED information and results. Discussed diagnosis and treatment plan with patient. Follow up instructions and return to ED instruction have been given. All questions and concerns were addressed at this time. Patient voices understanding of information and instructions. Patient is comfortable with plan and discharge. Patient is stable for discharge.                           Clinical Impression:   Final diagnoses:  [M54.50, G89.29] Chronic bilateral low back pain without sciatica (Primary)        ED Disposition Condition    Discharge Stable          ED Prescriptions    None       Follow-up Information       Follow up With Specialties Details Why Contact Info    [facility] Dept Emergency Medicine In 3 days As needed, If symptoms worsen [address] [facility] [id number] [phone]             Tani Hernandez Jr., FNP  05/27/23 1948

## 2023-05-28 NOTE — DISCHARGE INSTRUCTIONS
Continue previous home medications for pain symptoms.  Follow up with your primary care physician in 3-5 days for follow up evaluation.

## 2023-05-28 NOTE — ED NOTES
Here with Back pain. States hx of back pain. States too much pain today to take his home meds today. Rates lower back pain 8/10.

## 2023-05-29 ENCOUNTER — OFFICE VISIT (OUTPATIENT)
Dept: INTERNAL MEDICINE | Facility: CLINIC | Age: 68
End: 2023-05-29
Payer: MEDICARE

## 2023-05-29 VITALS
HEART RATE: 67 BPM | OXYGEN SATURATION: 98 % | WEIGHT: 229.63 LBS | BODY MASS INDEX: 34.01 KG/M2 | RESPIRATION RATE: 16 BRPM | SYSTOLIC BLOOD PRESSURE: 108 MMHG | HEIGHT: 69 IN | DIASTOLIC BLOOD PRESSURE: 79 MMHG | TEMPERATURE: 98 F

## 2023-05-29 DIAGNOSIS — I10 HYPERTENSION, UNSPECIFIED TYPE: Primary | ICD-10-CM

## 2023-05-29 DIAGNOSIS — G89.29 CHRONIC BACK PAIN, UNSPECIFIED BACK LOCATION, UNSPECIFIED BACK PAIN LATERALITY: ICD-10-CM

## 2023-05-29 DIAGNOSIS — M54.9 CHRONIC BACK PAIN, UNSPECIFIED BACK LOCATION, UNSPECIFIED BACK PAIN LATERALITY: ICD-10-CM

## 2023-05-29 PROCEDURE — 99215 OFFICE O/P EST HI 40 MIN: CPT | Mod: PBBFAC

## 2023-05-29 RX ORDER — METHOCARBAMOL 750 MG/1
750 TABLET, FILM COATED ORAL 3 TIMES DAILY
Status: ON HOLD | COMMUNITY
End: 2023-08-28 | Stop reason: HOSPADM

## 2023-05-29 RX ORDER — TRAMADOL HYDROCHLORIDE 50 MG/1
50 TABLET ORAL EVERY 6 HOURS PRN
COMMUNITY
End: 2023-08-08 | Stop reason: SDUPTHER

## 2023-05-29 NOTE — PROGRESS NOTES
Chief Complaint  Back Pain (Emergency room visit for back pain ,   would like a handicap sticker ,  lower back pain at times no weakness or numbness  )     HPI  Delio Daniel Jr. is a 67 y.o. male who has a past medical history of Arthritis, Atrial fibrillation, BPH (benign prostatic hyperplasia), Cardiac arrest, Coronary artery disease, Cyst, kidney, acquired, Diverticulosis, Hyperlipidemia, Hypertension, MI (myocardial infarction), Obesity, and Steatosis of liver.  He presents to clinic today for follow-up of chronic medical conditions. Has had multiple visits to the ED since I last saw him. ED visits were for back pain. CXR of lumbar spine in April with degenerative changes that are mild. Able to walk. No numbness/tingling. No bowel/bladder incontinence.     ROS  Review of Systems   Constitutional:  Negative for chills and fever.   Respiratory:  Negative for shortness of breath.    Cardiovascular:  Negative for chest pain and leg swelling.   Gastrointestinal:  Negative for nausea and vomiting.   Genitourinary:  Negative for dysuria.   Musculoskeletal:  Negative for myalgias.       PE  Vitals:    05/29/23 0753   BP: 108/79   Pulse: 67   Resp: 16   Temp: 97.6 °F (36.4 °C)          Physical Exam  Vitals and nursing note reviewed.   Constitutional:       General: He is awake. He is not in acute distress.     Appearance: Normal appearance. He is well-developed and well-groomed. He is obese. He is not ill-appearing, toxic-appearing or diaphoretic.   HENT:      Head: Normocephalic and atraumatic.      Right Ear: External ear normal.      Left Ear: External ear normal.      Mouth/Throat:      Mouth: Mucous membranes are moist.      Pharynx: Oropharynx is clear.   Eyes:      General: Lids are normal. Vision grossly intact.      Extraocular Movements: Extraocular movements intact.      Conjunctiva/sclera: Conjunctivae normal.      Pupils: Pupils are equal, round, and reactive to light.   Neck:      Trachea: Trachea  normal.   Cardiovascular:      Rate and Rhythm: Normal rate. Rhythm irregular.      Chest Wall: PMI is not displaced.      Pulses: Normal pulses.      Heart sounds: Normal heart sounds, S1 normal and S2 normal. No murmur heard.    No gallop.   Pulmonary:      Effort: Pulmonary effort is normal. No respiratory distress.      Breath sounds: Normal breath sounds. No decreased breath sounds, wheezing, rhonchi or rales.   Chest:      Chest wall: No tenderness.   Abdominal:      General: Bowel sounds are normal. There is no distension.      Palpations: Abdomen is soft. There is no mass.      Tenderness: There is no abdominal tenderness.   Musculoskeletal:         General: No swelling or tenderness. Normal range of motion.      Cervical back: Normal range of motion.      Right lower leg: No edema.      Left lower leg: No edema.      Comments: No spinal tenderness   Skin:     General: Skin is warm and dry.      Coloration: Skin is not cyanotic, jaundiced or pale.   Neurological:      General: No focal deficit present.      Mental Status: He is alert and oriented to person, place, and time.      Sensory: Sensation is intact.      Motor: Motor function is intact.      Coordination: Coordination is intact.      Gait: Gait is intact.   Psychiatric:         Behavior: Behavior is cooperative.        Assessment/Plan  Back Pain  - Stretch and exercise. Ibuprofen as needed  -Handicap motor tag    Hypertension   -Blood pressure 108/79  -C/w Current Regimen      CAD with history of NSTEMI in 2003  Second-degree AV block s/p pacemaker now ICD   -Continue atorvastatin 40 mg daily and aspirin 81 mg daily  A fib   -Currently asymptomatic, rate controlled   -metoprolol, diltiazem, and Xarelto  Abdominal pain in the setting of history of Small bowel carcinoid tumor s/p resection (2018)   -Currently followed by [facility] oncology and [facility]    DOES NOT WANT VACCINES  RTC in 4 months.      Future Appointments   Date Time Provider  Department Center   7/3/2023  9:00 AM [facility] CT1 450 LB LIMIT [facility] Kulwant Un   7/3/2023  9:30 AM [facility] CT1 450 LB LIMIT [facility] Kulwant Un   7/20/2023 12:00 PM NURSE, [facility]   7/20/2023  1:00 PM Lukas Reed [facility]   8/1/2023  8:30 AM PACEMAKER, [facility] CARDIOLOGY [facility] CHERELLE Blum Un   8/8/2023  1:00 PM Rico Celis MD [facility] CHERELLE Blum Un     Magdi Rivera,   Internal Medicine - PGY-2

## 2023-06-21 ENCOUNTER — TELEPHONE (OUTPATIENT)
Dept: INTERNAL MEDICINE | Facility: CLINIC | Age: 68
End: 2023-06-21
Payer: MEDICARE

## 2023-06-21 RX ORDER — CHOLECALCIFEROL (VITAMIN D3) 25 MCG
1000 TABLET ORAL DAILY
Qty: 30 TABLET | Refills: 5 | Status: CANCELLED | OUTPATIENT
Start: 2023-06-21

## 2023-06-22 RX ORDER — CHOLECALCIFEROL (VITAMIN D3) 25 MCG
1000 TABLET ORAL DAILY
Qty: 30 TABLET | Refills: 1 | Status: SHIPPED | OUTPATIENT
Start: 2023-06-22 | End: 2023-11-15 | Stop reason: SDUPTHER

## 2023-06-23 ENCOUNTER — HOSPITAL ENCOUNTER (EMERGENCY)
Facility: HOSPITAL | Age: 68
Discharge: HOME | End: 2023-06-23
Attending: INTERNAL MEDICINE
Payer: MEDICARE

## 2023-06-23 VITALS
TEMPERATURE: 98 F | SYSTOLIC BLOOD PRESSURE: 156 MMHG | WEIGHT: 229.25 LBS | OXYGEN SATURATION: 100 % | BODY MASS INDEX: 33.96 KG/M2 | DIASTOLIC BLOOD PRESSURE: 96 MMHG | RESPIRATION RATE: 17 BRPM | HEART RATE: 64 BPM | HEIGHT: 69 IN

## 2023-06-23 DIAGNOSIS — M54.50 CHRONIC LOW BACK PAIN WITHOUT SCIATICA, UNSPECIFIED BACK PAIN LATERALITY: Primary | ICD-10-CM

## 2023-06-23 DIAGNOSIS — G89.29 CHRONIC LOW BACK PAIN WITHOUT SCIATICA, UNSPECIFIED BACK PAIN LATERALITY: Primary | ICD-10-CM

## 2023-06-23 PROCEDURE — 99283 EMERGENCY DEPT VISIT LOW MDM: CPT

## 2023-06-23 PROCEDURE — 25000003 PHARM REV CODE 250: Performed by: PHYSICIAN ASSISTANT

## 2023-06-23 RX ORDER — TRAMADOL HYDROCHLORIDE 50 MG/1
50 TABLET ORAL
Status: COMPLETED | OUTPATIENT
Start: 2023-06-23 | End: 2023-06-23

## 2023-06-23 RX ORDER — TRAMADOL HYDROCHLORIDE 50 MG/1
50 TABLET ORAL EVERY 6 HOURS PRN
Qty: 12 TABLET | Refills: 0 | Status: SHIPPED | OUTPATIENT
Start: 2023-06-23 | End: 2023-08-17 | Stop reason: SDUPTHER

## 2023-06-23 RX ADMIN — TRAMADOL HYDROCHLORIDE 50 MG: 50 TABLET, COATED ORAL at 07:06

## 2023-06-23 NOTE — ED PROVIDER NOTES
Encounter Date: 6/23/2023     History     Chief Complaint   Patient presents with    Back Pain     C/o back pain states hx of arthritis. C/o lower back pain which started when he got out of car     Patient with pmhx of atrial fibrillation, htn, hld, CAD, BPH, and chronic low back pain presents today c/o low back pain. Patient came to the hospital today with a family member and said as he was getting out of the car his low back started hurting. Pain is worse with movement. He says he has arthritis in his lumbar spine which causes his pain. Denies injury or trauma, focal weakness, paresthesia, bladder/bowel incontinence, saddle numbness, dysuria, hematuria.     The history is provided by the patient. No  was used.   Review of patient's allergies indicates:  No Known Allergies  Past Medical History:   Diagnosis Date    Arthritis     Atrial fibrillation     BPH (benign prostatic hyperplasia)     Cardiac arrest     Coronary artery disease     Cyst, kidney, acquired     Diverticulosis     Hyperlipidemia     Hypertension     MI (myocardial infarction)     Obesity     Steatosis of liver      Past Surgical History:   Procedure Laterality Date    A-V CARDIAC PACEMAKER INSERTION Right     CARDIAC CATHETERIZATION      COLONOSCOPY W/ BIOPSIES      excision of colon       Family History   Problem Relation Age of Onset    Hypertension Mother     Hypertension Father     Hypertension Sister      Social History     Tobacco Use    Smoking status: Former    Smokeless tobacco: Never   Substance Use Topics    Alcohol use: Not Currently    Drug use: Not Currently     Review of Systems   Constitutional:  Negative for chills and fever.   Respiratory:  Negative for cough, chest tightness and shortness of breath.    Cardiovascular:  Negative for chest pain, palpitations and leg swelling.   Gastrointestinal:  Negative for abdominal pain, constipation, diarrhea, nausea and vomiting.   Genitourinary:  Negative for dysuria,  flank pain and hematuria.   Musculoskeletal:  Positive for back pain. Negative for arthralgias, gait problem, neck pain and neck stiffness.   Skin:  Negative for rash.   Neurological:  Negative for syncope, weakness, light-headedness, numbness and headaches.   All other systems reviewed and are negative.    Physical Exam     Initial Vitals [06/23/23 0722]   BP Pulse Resp Temp SpO2   (!) 160/113 67 20 97.5 °F (36.4 °C) 100 %      MAP       --         Physical Exam    Nursing note and vitals reviewed.  Constitutional: He appears well-developed and well-nourished. He is not diaphoretic. No distress.   HENT:   Head: Normocephalic and atraumatic.   Mouth/Throat: Oropharynx is clear and moist. No oropharyngeal exudate.   Eyes: Conjunctivae and EOM are normal.   Neck: Neck supple.   Normal range of motion.  Cardiovascular:  Normal rate, regular rhythm, normal heart sounds and intact distal pulses.           Pulmonary/Chest: Breath sounds normal. No respiratory distress.   Abdominal: Abdomen is soft. Bowel sounds are normal. He exhibits no distension. There is no abdominal tenderness. There is no rebound and no guarding.   Musculoskeletal:         General: No edema.      Cervical back: Normal range of motion and neck supple.      Comments: Mild ttp over bilateral lumbar paraspinal muscles. No VPT. No step offs.      Neurological: He is alert and oriented to person, place, and time. GCS score is 15. GCS eye subscore is 4. GCS verbal subscore is 5. GCS motor subscore is 6.   Skin: Skin is warm and dry. Capillary refill takes less than 2 seconds. No rash noted.   Psychiatric: He has a normal mood and affect.       ED Course   Procedures  Labs Reviewed - No data to display       Imaging Results    None          Medications   traMADoL tablet 50 mg (has no administration in time range)     Medical Decision Making:   History:   Old Records Summarized: other records and records from clinic visits.  Initial Assessment:   Patient  with hx of chronic low back pain presents c/o of low back pain  Differential Diagnosis:   Chronic low back pain, acute on chronic low back pain, sciatica, uti   Patient is non-toxic appearing. Vitals stable. Back pain is chronic in nature. I will prescribe him a short course of tramadol. He is to f/u with pcp. Stable for discharge. ED precautions given.                     Clinical Impression:   Final diagnoses:  [M54.50, G89.29] Chronic low back pain without sciatica, unspecified back pain laterality (Primary)        ED Disposition Condition    Discharge Stable          ED Prescriptions       Medication Sig Dispense Start Date End Date Auth. Provider    traMADoL (ULTRAM) 50 mg tablet Take 1 tablet (50 mg total) by mouth every 6 (six) hours as needed for Pain. 12 tablet 6/23/2023 -- KAVIN Dunn          Follow-up Information       Follow up With Specialties Details Why Contact Info    [facility] Emergency Medicine  If symptoms worsen return to ED immediately [address] [facility] [id number] [phone]    Magdi Rivera, DO Internal Medicine In 2 days  [address] [facility] [address]  [phone]               KAVIN Dunn  06/23/23 0904

## 2023-07-01 ENCOUNTER — HOSPITAL ENCOUNTER (EMERGENCY)
Facility: HOSPITAL | Age: 68
Discharge: HOME | End: 2023-07-01
Attending: EMERGENCY MEDICINE
Payer: MEDICARE

## 2023-07-01 VITALS
HEIGHT: 69 IN | OXYGEN SATURATION: 99 % | DIASTOLIC BLOOD PRESSURE: 117 MMHG | SYSTOLIC BLOOD PRESSURE: 182 MMHG | BODY MASS INDEX: 33.63 KG/M2 | WEIGHT: 227.06 LBS | RESPIRATION RATE: 20 BRPM | TEMPERATURE: 97 F | HEART RATE: 81 BPM

## 2023-07-01 DIAGNOSIS — M54.50 CHRONIC LEFT-SIDED LOW BACK PAIN WITHOUT SCIATICA: Primary | ICD-10-CM

## 2023-07-01 DIAGNOSIS — G89.29 CHRONIC LEFT-SIDED LOW BACK PAIN WITHOUT SCIATICA: Primary | ICD-10-CM

## 2023-07-01 PROCEDURE — 99283 EMERGENCY DEPT VISIT LOW MDM: CPT

## 2023-07-02 NOTE — DISCHARGE INSTRUCTIONS
Report to Emergency Department if symptoms return or worsen; [facility] - Medicine Clinic Within 1 to 2 days, It is important that you follow up with your primary care provider or specialist if indicated for further evaluation, workup, and treatment as necessary. The exam and treatment you received in Emergency Department was for an urgent problem and NOT INTENDED AS COMPLETE CARE. It is important that you FOLLOW UP with a doctor for ongoing care. If your symptoms become WORSE or you DO NOT IMPROVE and you are unable to reach your health care provider, you should RETURN to the Emergency Department. The Emergency Department provider has provided a PRELIMINARY INTERPRETATION of all your studies. A final interpretation may be done after you are discharged. If a change in your diagnosis or treatment is needed WE WILL CONTACT YOU. It is critical that we have a CURRENT PHONE NUMBER FOR YOU.

## 2023-07-02 NOTE — ED PROVIDER NOTES
"Encounter Date: 7/1/2023       History     Chief Complaint   Patient presents with    Back Pain     Pt c/o back pain denies injury. Pt unable to take his home medication for his back pain because he "didn't have a bottle of water"     66 yo M w/ PMHx significant for chronic low back pain presents to ED c/o L sided low back pain. Patient reports pain is due to lying in bed too long today. Denies any recent falls or other injuries. Reports he has meds at home for back pain, but states he did not have a bottle of water, so he came to ED. Reports having running water at home, but states he "doesn't mess with" tap water. Denies all other complaints including saddle anesthesia, incontinence, bowel/bladder retention, numbness, paresthesia, focal weakness, inability to bear weight/ambulate, F/C, CP, SOB, palpitations, diaphoresis, syncope, HA, dizziness, vision changes, ataxia, abnormal balance. Hypertensive on arrival, vitals otherwise stable. Patient in NAD.    Review of patient's allergies indicates:  No Known Allergies  Past Medical History:   Diagnosis Date    Arthritis     Atrial fibrillation     BPH (benign prostatic hyperplasia)     Cardiac arrest     Coronary artery disease     Cyst, kidney, acquired     Diverticulosis     Hyperlipidemia     Hypertension     MI (myocardial infarction)     Obesity     Steatosis of liver      Past Surgical History:   Procedure Laterality Date    A-V CARDIAC PACEMAKER INSERTION Right     CARDIAC CATHETERIZATION      COLONOSCOPY W/ BIOPSIES      excision of colon       Family History   Problem Relation Age of Onset    Hypertension Mother     Hypertension Father     Hypertension Sister      Social History     Tobacco Use    Smoking status: Former    Smokeless tobacco: Never   Substance Use Topics    Alcohol use: Not Currently    Drug use: Not Currently     Review of Systems   All other systems reviewed and are negative.    Physical Exam     Initial Vitals [07/01/23 1930]   BP Pulse Resp " Temp SpO2   (!) 182/117 81 20 96.6 °F (35.9 °C) 99 %      MAP       --         Physical Exam    Nursing note and vitals reviewed.  Constitutional: He appears well-developed and well-nourished. He is not diaphoretic. No distress.   HENT:   Head: Normocephalic and atraumatic.   Eyes: Conjunctivae and EOM are normal. Pupils are equal, round, and reactive to light. No scleral icterus.   Neck: Neck supple.   Normal range of motion.   Full passive range of motion without pain.     Cardiovascular:  Normal rate, regular rhythm, normal heart sounds and intact distal pulses.     Exam reveals no gallop and no friction rub.       No murmur heard.  Pulmonary/Chest: Breath sounds normal. No respiratory distress. He has no wheezes. He has no rhonchi. He has no rales.   Abdominal: Abdomen is soft. Bowel sounds are normal. He exhibits no distension, no abdominal bruit and no pulsatile midline mass. There is no abdominal tenderness.   No right CVA tenderness.  No left CVA tenderness. There is no rebound and no guarding.   Musculoskeletal:         General: Normal range of motion.      Cervical back: Full passive range of motion without pain, normal range of motion and neck supple. No rigidity.     Neurological: He is alert and oriented to person, place, and time. He has normal strength and normal reflexes. He is not disoriented. He displays no tremor. No cranial nerve deficit or sensory deficit. He exhibits normal muscle tone. He displays a negative Romberg sign. He displays no seizure activity. Coordination and gait normal. GCS score is 15. GCS eye subscore is 4. GCS verbal subscore is 5. GCS motor subscore is 6.   Skin: Skin is warm and dry. Capillary refill takes less than 2 seconds. No pallor.   Psychiatric: He has a normal mood and affect.       ED Course   Procedures  Labs Reviewed - No data to display       Imaging Results    None          Medications - No data to display  Medical Decision Making:   Patient has unremarkable  abdominal exam w/o signs of spinal cord compression, focal neuro deficits, meningeal signs or cardiac abnormality. Nontoxic appearing w/ unremarkable vitals, stable for discharge. Patient given bottle of water. Instructed to contact PCP on Monday. ED precautions given for new or worsening symptoms.                        Clinical Impression:   Final diagnoses:  [M54.50, G89.29] Chronic left-sided low back pain without sciatica (Primary)        ED Disposition Condition    Discharge Good          ED Prescriptions    None       Follow-up Information       Follow up With Specialties Details Why Contact Info    Magdi Rivera,  Internal Medicine Call on 7/3/2023  [address] [facility] [address]  [phone]      [facility] Emergency Medicine  As needed, If symptoms worsen [address] [facility] [id number] [phone]             KAVIN Mejia  07/01/23 1944

## 2023-07-03 ENCOUNTER — HOSPITAL ENCOUNTER (OUTPATIENT)
Dept: RADIOLOGY | Facility: HOSPITAL | Age: 68
Discharge: HOME | End: 2023-07-03
Attending: INTERNAL MEDICINE
Payer: MEDICARE

## 2023-07-03 DIAGNOSIS — C7A.8 NEUROENDOCRINE CARCINOMA OF SMALL BOWEL: ICD-10-CM

## 2023-07-03 LAB
CREAT SERPL-MCNC: 1.23 MG/DL (ref 0.73–1.18)
GFR SERPLBLD CREATININE-BSD FMLA CKD-EPI: >60 MLS/MIN/1.73/M2

## 2023-07-03 PROCEDURE — 71260 CT THORAX DX C+: CPT | Mod: TC

## 2023-07-03 PROCEDURE — 25500020 PHARM REV CODE 255: Performed by: INTERNAL MEDICINE

## 2023-07-03 PROCEDURE — 82565 ASSAY OF CREATININE: CPT | Performed by: INTERNAL MEDICINE

## 2023-07-03 PROCEDURE — 74177 CT ABD & PELVIS W/CONTRAST: CPT | Mod: TC

## 2023-07-03 RX ADMIN — IOHEXOL 100 ML: 350 INJECTION, SOLUTION INTRAVENOUS at 12:07

## 2023-07-10 ENCOUNTER — HOSPITAL ENCOUNTER (EMERGENCY)
Facility: HOSPITAL | Age: 68
Discharge: HOME | End: 2023-07-10
Attending: EMERGENCY MEDICINE
Payer: MEDICARE

## 2023-07-10 VITALS
HEART RATE: 80 BPM | WEIGHT: 227 LBS | RESPIRATION RATE: 18 BRPM | TEMPERATURE: 98 F | SYSTOLIC BLOOD PRESSURE: 156 MMHG | OXYGEN SATURATION: 100 % | DIASTOLIC BLOOD PRESSURE: 108 MMHG | BODY MASS INDEX: 33.52 KG/M2

## 2023-07-10 DIAGNOSIS — M54.50 CHRONIC LEFT-SIDED LOW BACK PAIN WITHOUT SCIATICA: Primary | ICD-10-CM

## 2023-07-10 DIAGNOSIS — G89.29 CHRONIC LEFT-SIDED LOW BACK PAIN WITHOUT SCIATICA: Primary | ICD-10-CM

## 2023-07-10 PROCEDURE — 25000003 PHARM REV CODE 250: Performed by: PHYSICIAN ASSISTANT

## 2023-07-10 PROCEDURE — 99283 EMERGENCY DEPT VISIT LOW MDM: CPT

## 2023-07-10 RX ORDER — METHOCARBAMOL 500 MG/1
500 TABLET, FILM COATED ORAL
Status: COMPLETED | OUTPATIENT
Start: 2023-07-10 | End: 2023-07-10

## 2023-07-10 RX ORDER — KETOROLAC TROMETHAMINE 10 MG/1
10 TABLET, FILM COATED ORAL
Status: COMPLETED | OUTPATIENT
Start: 2023-07-10 | End: 2023-07-10

## 2023-07-10 RX ADMIN — METHOCARBAMOL 500 MG: 500 TABLET ORAL at 11:07

## 2023-07-10 RX ADMIN — KETOROLAC TROMETHAMINE 10 MG: 10 TABLET, FILM COATED ORAL at 11:07

## 2023-07-10 NOTE — ED PROVIDER NOTES
Encounter Date: 7/10/2023       History     Chief Complaint   Patient presents with    Back Pain     Back pain, chronic, denies recent trauma. Did not take home pain meds this am     Delio Daniel Jr. Is a 67 y.o. with a pmhx of atrial fibrillation, htn, hld, CAD, BPH, and chronic low back pain presents today c/o low back pain. Patient states he was getting in a cab to go to a restaurant for breakfast when he developed a pain in his lower back. Pain is worse with movement. He says he has arthritis in his lumbar spine which causes his pain. Denies injury or trauma, focal weakness, paresthesia, bladder/bowel incontinence, saddle numbness, dysuria, hematuria.     The history is provided by the patient. No  was used.   Review of patient's allergies indicates:  No Known Allergies  Past Medical History:   Diagnosis Date    Arthritis     Atrial fibrillation     BPH (benign prostatic hyperplasia)     Cardiac arrest     Coronary artery disease     Cyst, kidney, acquired     Diverticulosis     Hyperlipidemia     Hypertension     MI (myocardial infarction)     Obesity     Steatosis of liver      Past Surgical History:   Procedure Laterality Date    A-V CARDIAC PACEMAKER INSERTION Right     CARDIAC CATHETERIZATION      COLONOSCOPY W/ BIOPSIES      excision of colon       Family History   Problem Relation Age of Onset    Hypertension Mother     Hypertension Father     Hypertension Sister      Social History     Tobacco Use    Smoking status: Former    Smokeless tobacco: Never   Substance Use Topics    Alcohol use: Not Currently    Drug use: Not Currently     Review of Systems   Constitutional:  Negative for activity change, chills and fever.   HENT:  Negative for trouble swallowing.    Eyes:  Negative for photophobia and visual disturbance.   Respiratory:  Negative for chest tightness, shortness of breath and wheezing.    Cardiovascular:  Negative for chest pain, palpitations and leg swelling.    Gastrointestinal:  Negative for abdominal pain, constipation, diarrhea, nausea and vomiting.   Genitourinary:  Negative for dysuria, frequency, hematuria and urgency.   Musculoskeletal:  Positive for back pain. Negative for arthralgias and gait problem.   Skin:  Negative for color change and rash.   Neurological:  Negative for dizziness, syncope, weakness, light-headedness, numbness and headaches.   Psychiatric/Behavioral:  Negative for agitation and confusion. The patient is not nervous/anxious.      Physical Exam     Initial Vitals [07/10/23 1044]   BP Pulse Resp Temp SpO2   (!) 159/111 87 18 98.2 °F (36.8 °C) 100 %      MAP       --         Physical Exam    Nursing note and vitals reviewed.  Constitutional: He appears well-developed and well-nourished. No distress.   HENT:   Head: Normocephalic and atraumatic.   Mouth/Throat: No oropharyngeal exudate.   Eyes: EOM are normal. No scleral icterus.   Neck: Neck supple.   Normal range of motion.  Cardiovascular:  Normal rate and regular rhythm.           No murmur heard.  Pulmonary/Chest: No respiratory distress. He has no wheezes.   Abdominal: Abdomen is soft. He exhibits no distension. There is no abdominal tenderness.   Musculoskeletal:         General: No edema. Normal range of motion.      Cervical back: Normal range of motion and neck supple.      Comments: Mild ttp over bilateral lumbar paraspinal muscles. No VPT. No step offs.       Neurological: He is alert and oriented to person, place, and time. No cranial nerve deficit.   Skin: Skin is warm and dry. Capillary refill takes less than 2 seconds. No erythema.   Psychiatric: He has a normal mood and affect. Thought content normal.       ED Course   Procedures  Labs Reviewed - No data to display       Imaging Results    None          Medications   ketorolac tablet 10 mg (10 mg Oral Given 7/10/23 1122)   methocarbamoL tablet 500 mg (500 mg Oral Given 7/10/23 1122)     Medical Decision Making:   Differential  Diagnosis:   Chronic low back pain, acute on chronic low back pain, sciatica, uti   ED Management:  Patient is non-toxic appearing. Vitals stable. Back pain is chronic in nature. He is to f/u with pcp. Stable for discharge. ED precautions given.                         Clinical Impression:   Final diagnoses:  [M54.50, G89.29] Chronic left-sided low back pain without sciatica (Primary)        ED Disposition Condition    Discharge Stable          ED Prescriptions    None       Follow-up Information       Follow up With Specialties Details Why Contact Info    Magdi Rivera, DO Internal Medicine In 1 week Hospital follow up [address] [facility] [address]  [phone]      [facility] Emergency Medicine  If symptoms worsen [address] [facility] [id number] [phone]             Ning Stoner PA-C  07/10/23 1939

## 2023-07-14 ENCOUNTER — HOSPITAL ENCOUNTER (EMERGENCY)
Facility: HOSPITAL | Age: 68
Discharge: HOME | End: 2023-07-14
Attending: EMERGENCY MEDICINE
Payer: MEDICARE

## 2023-07-14 VITALS
SYSTOLIC BLOOD PRESSURE: 174 MMHG | WEIGHT: 227 LBS | RESPIRATION RATE: 20 BRPM | TEMPERATURE: 98 F | HEART RATE: 73 BPM | DIASTOLIC BLOOD PRESSURE: 98 MMHG | OXYGEN SATURATION: 99 % | BODY MASS INDEX: 33.62 KG/M2 | HEIGHT: 69 IN

## 2023-07-14 DIAGNOSIS — G89.29 CHRONIC BILATERAL LOW BACK PAIN WITHOUT SCIATICA: Primary | ICD-10-CM

## 2023-07-14 DIAGNOSIS — M54.50 CHRONIC BILATERAL LOW BACK PAIN WITHOUT SCIATICA: Primary | ICD-10-CM

## 2023-07-14 PROCEDURE — 25000003 PHARM REV CODE 250: Performed by: NURSE PRACTITIONER

## 2023-07-14 PROCEDURE — 99283 EMERGENCY DEPT VISIT LOW MDM: CPT

## 2023-07-14 RX ORDER — BACLOFEN 10 MG/1
10 TABLET ORAL 3 TIMES DAILY PRN
Qty: 21 TABLET | Refills: 0 | Status: ON HOLD | OUTPATIENT
Start: 2023-07-14 | End: 2023-08-16 | Stop reason: HOSPADM

## 2023-07-14 RX ORDER — METHOCARBAMOL 500 MG/1
1000 TABLET, FILM COATED ORAL
Status: COMPLETED | OUTPATIENT
Start: 2023-07-14 | End: 2023-07-14

## 2023-07-14 RX ADMIN — METHOCARBAMOL 1000 MG: 500 TABLET ORAL at 10:07

## 2023-07-15 NOTE — ED PROVIDER NOTES
Encounter Date: 7/14/2023       History     Chief Complaint   Patient presents with    Back Pain     Pt c/o chronic back pain worsening since this morning      Pt is a 67 y.o. male whop presents to the [facility] ED complaining of a flare of lower back pain. Symptoms began this AM. Pt with Hx of atrial fibrillation, BPH, CAD, HTN, MI, and diverticulosis. Denies injury to lower back, chest pain, SOB, weakness, dizziness, fever, abdominal pain, or loss of bowel or bladder control.     Review of patient's allergies indicates:  No Known Allergies  Past Medical History:   Diagnosis Date    Arthritis     Atrial fibrillation     BPH (benign prostatic hyperplasia)     Cardiac arrest     Coronary artery disease     Cyst, kidney, acquired     Diverticulosis     Hyperlipidemia     Hypertension     MI (myocardial infarction)     Obesity     Steatosis of liver      Past Surgical History:   Procedure Laterality Date    A-V CARDIAC PACEMAKER INSERTION Right     CARDIAC CATHETERIZATION      COLONOSCOPY W/ BIOPSIES      excision of colon       Family History   Problem Relation Age of Onset    Hypertension Mother     Hypertension Father     Hypertension Sister      Social History     Tobacco Use    Smoking status: Former    Smokeless tobacco: Never   Substance Use Topics    Alcohol use: Not Currently    Drug use: Not Currently     Review of Systems   Constitutional:  Negative for chills, diaphoresis, fatigue and fever.   HENT:  Negative for facial swelling, postnasal drip, rhinorrhea, sinus pressure, sinus pain, sore throat and trouble swallowing.    Respiratory:  Negative for cough, chest tightness, shortness of breath and wheezing.    Cardiovascular:  Negative for chest pain, palpitations and leg swelling.   Gastrointestinal:  Negative for abdominal pain, diarrhea, nausea and vomiting.   Genitourinary:  Negative for dysuria, flank pain, hematuria and urgency.   Musculoskeletal:  Positive for back pain. Negative for arthralgias and  myalgias.   Skin:  Negative for color change and rash.   Neurological:  Negative for dizziness, syncope, weakness and headaches.   Hematological:  Does not bruise/bleed easily.   All other systems reviewed and are negative.    Physical Exam     Initial Vitals [07/14/23 2227]   BP Pulse Resp Temp SpO2   (!) 174/98 73 20 98 °F (36.7 °C) 99 %      MAP       --         Physical Exam    Nursing note and vitals reviewed.  Constitutional: Vital signs are normal. He appears well-developed and well-nourished.   HENT:   Head: Normocephalic.   Nose: Nose normal.   Mouth/Throat: Oropharynx is clear and moist.   Eyes: Conjunctivae and EOM are normal. Pupils are equal, round, and reactive to light.   Neck: Neck supple.   Normal range of motion.  Cardiovascular:  Normal rate, regular rhythm, normal heart sounds and intact distal pulses.           Pulmonary/Chest: Effort normal and breath sounds normal. No respiratory distress. He has no wheezes. He has no rhonchi. He has no rales. He exhibits no tenderness.   Abdominal: Abdomen is soft and flat. Bowel sounds are normal. There is no abdominal tenderness. There is no rebound, no guarding, no tenderness at McBurney's point and negative Lake's sign.   Musculoskeletal:         General: Normal range of motion.      Cervical back: Normal range of motion and neck supple.      Lumbar back: Spasms and tenderness present. No swelling, edema, deformity or signs of trauma. Normal range of motion.        Back:      Neurological: He is alert and oriented to person, place, and time. He has normal strength.   Skin: Skin is warm and dry. Capillary refill takes less than 2 seconds.   Psychiatric: He has a normal mood and affect. His behavior is normal. Judgment and thought content normal.       ED Course   Procedures  Labs Reviewed - No data to display       Imaging Results    None          Medications   methocarbamoL tablet 1,000 mg (has no administration in time range)     Medical Decision  Making:   Differential Diagnosis:   Chronic back pain    ED Management:  10:40 PM Reassessed patient at this time. Reports condition has improved. Discussed with patient all pertinent ED information and results. Discussed diagnosis and treatment plan with patient. Follow up instructions and return to ED instruction have been given. All questions and concerns were addressed at this time. Patient voices understanding of information and instructions. Patient is comfortable with plan and discharge. Patient is stable for discharge.                           Clinical Impression:   Final diagnoses:  [M54.50, G89.29] Chronic bilateral low back pain without sciatica (Primary)        ED Disposition Condition    Discharge Stable          ED Prescriptions       Medication Sig Dispense Start Date End Date Auth. Provider    baclofen (LIORESAL) 10 MG tablet Take 1 tablet (10 mg total) by mouth 3 (three) times daily as needed (muscle spasms). 21 tablet 7/14/2023 -- Tani Hernandez Jr., TRUMAN          Follow-up Information       Follow up With Specialties Details Why Contact Info    Magdi Rivera, DO Internal Medicine In 3 days  [address] [facility] [address]  [phone]      [facility] - Emergency Dept Emergency Medicine In 3 days As needed, If symptoms worsen [address] [facility] [id number] [phone]             TRUMAN Garcia Jr.  07/14/23 4541

## 2023-07-17 ENCOUNTER — HOSPITAL ENCOUNTER (EMERGENCY)
Facility: HOSPITAL | Age: 68
Discharge: HOME | End: 2023-07-17
Attending: EMERGENCY MEDICINE
Payer: MEDICARE

## 2023-07-17 VITALS
SYSTOLIC BLOOD PRESSURE: 131 MMHG | BODY MASS INDEX: 35.55 KG/M2 | RESPIRATION RATE: 18 BRPM | TEMPERATURE: 97 F | DIASTOLIC BLOOD PRESSURE: 95 MMHG | HEART RATE: 70 BPM | HEIGHT: 69 IN | OXYGEN SATURATION: 98 % | WEIGHT: 240 LBS

## 2023-07-17 DIAGNOSIS — M54.50 CHRONIC LOW BACK PAIN, UNSPECIFIED BACK PAIN LATERALITY, UNSPECIFIED WHETHER SCIATICA PRESENT: Primary | ICD-10-CM

## 2023-07-17 DIAGNOSIS — G89.29 CHRONIC LOW BACK PAIN, UNSPECIFIED BACK PAIN LATERALITY, UNSPECIFIED WHETHER SCIATICA PRESENT: Primary | ICD-10-CM

## 2023-07-17 PROCEDURE — 96372 THER/PROPH/DIAG INJ SC/IM: CPT | Performed by: EMERGENCY MEDICINE

## 2023-07-17 PROCEDURE — 63600175 PHARM REV CODE 636 W HCPCS: Performed by: EMERGENCY MEDICINE

## 2023-07-17 PROCEDURE — 99284 EMERGENCY DEPT VISIT MOD MDM: CPT

## 2023-07-17 RX ORDER — KETOROLAC TROMETHAMINE 30 MG/ML
15 INJECTION, SOLUTION INTRAMUSCULAR; INTRAVENOUS
Status: COMPLETED | OUTPATIENT
Start: 2023-07-17 | End: 2023-07-17

## 2023-07-17 RX ADMIN — KETOROLAC TROMETHAMINE 15 MG: 30 INJECTION, SOLUTION INTRAMUSCULAR; INTRAVENOUS at 06:07

## 2023-07-17 NOTE — ED PROVIDER NOTES
ED PROVIDER NOTE  7/17/2023    CHIEF COMPLAINT:   Chief Complaint   Patient presents with    Back Pain     Arrived via [facility] with c/o chronic lower back pain, atraumatic. Ambulatory with cane. Seen here two days ago for same complaint and did not fill rx yet       HISTORY OF PRESENT ILLNESS:   Delio Daniel Jr. is a 67 y.o. male who presents with chief complaint Back pain. States that it start hurting again last night. He was not able to  his medications at the pharmacy.    The history is provided by the patient.       REVIEW OF SYSTEMS: as noted in the HPI.  NURSING NOTES REVIEWED      PAST MEDICAL/SURGICAL HISTORY:   Past Medical History:   Diagnosis Date    Arthritis     Atrial fibrillation     BPH (benign prostatic hyperplasia)     Cardiac arrest     Coronary artery disease     Cyst, kidney, acquired     Diverticulosis     Hyperlipidemia     Hypertension     MI (myocardial infarction)     Obesity     Steatosis of liver       Past Surgical History:   Procedure Laterality Date    A-V CARDIAC PACEMAKER INSERTION Right     CARDIAC CATHETERIZATION      COLONOSCOPY W/ BIOPSIES      excision of colon         FAMILY HISTORY:   Family History   Problem Relation Age of Onset    Hypertension Mother     Hypertension Father     Hypertension Sister        SOCIAL HISTORY:   Social History     Tobacco Use    Smoking status: Former    Smokeless tobacco: Never   Substance Use Topics    Alcohol use: Not Currently    Drug use: Not Currently       ALLERGIES: Review of patient's allergies indicates:  No Known Allergies    PHYSICAL EXAM:  Initial Vitals [07/17/23 0304]   BP Pulse Resp Temp SpO2   129/67 60 18 97 °F (36.1 °C) 98 %      MAP       --         Physical Exam    Nursing note and vitals reviewed.  Constitutional: He appears well-developed and well-nourished. No distress.   HENT:   Head: Normocephalic and atraumatic.   Nose: Nose normal.   Mouth/Throat: Oropharynx is clear and moist and mucous membranes are normal.    Eyes: Conjunctivae and EOM are normal. Pupils are equal, round, and reactive to light.   Neck: Neck supple. No tracheal deviation present.   Cardiovascular:  Normal rate, regular rhythm, normal heart sounds, intact distal pulses and normal pulses.           Pulmonary/Chest: Effort normal and breath sounds normal. No respiratory distress.   Abdominal: Abdomen is soft. There is no abdominal tenderness. There is no rebound and no guarding.   Musculoskeletal:         General: Normal range of motion.      Cervical back: Neck supple.     Neurological: He is alert and oriented to person, place, and time. GCS eye subscore is 4. GCS verbal subscore is 5. GCS motor subscore is 6.   CN II-XII intact. Moves all extremities. No gross sensory or motor deficits.   Skin: Skin is warm, dry and intact.   Psychiatric: He has a normal mood and affect. His speech is normal and behavior is normal. Judgment and thought content normal. Cognition and memory are normal.       RESULTS:  Labs Reviewed - No data to display  Imaging Results    None         PROCEDURES:  Procedures    ECG:       ED COURSE AND MEDICAL DECISION MAKING:  Medications   ketorolac injection 15 mg (15 mg Intramuscular Given 7/17/23 0623)           Medical Decision Making  67-year-old male presents with chronic low back pain stating he was not able to get to the pharmacy to  his prescription.  I estimate there is LOW risk for ABDOMINAL AORTIC ANEURYSM, CAUDA EQUINA SYNDROME, EPIDURAL MASS LESION, SPINAL STENOSIS, OR HERNIATED DISK CAUSING SEVERE STENOSIS, thus I consider the discharge disposition reasonable. We have discussed the diagnosis and risks, and we agree with discharging home to follow-up with their primary doctor. We also discussed returning to the Emergency Department immediately if new or worsening symptoms occur. We have discussed the symptoms which are most concerning (e.g., saddle anesthesia, urinary or bowel incontinence or retention, changing or  worsening pain) that necessitate immediate return.  Given strict ED return precautions. I have spoken with the patient and/or caregivers. I have explained the patient's condition, diagnoses and treatment plan based on the information available to me at this time. I have answered the patient's and/or caregiver's questions and addressed any concerns. The patient and/or caregivers have as good an understanding of the patient's diagnosis, condition and treatment plan as can be expected at this point. The vital signs have been stable. The patient's condition is stable and appropriate for discharge from the emergency department.     The patient will pursue further outpatient evaluation with the primary care physician or other designated or consulting physician as outlined in the discharge instructions. The patient and/or caregivers are agreeable to this plan of care and follow-up instructions have been explained in detail. The patient and/or caregivers have received these instructions in written format and have expressed an understanding of the discharge instructions. The patient and/or caregivers are aware that any significant change in condition or worsening of symptoms should prompt an immediate return to this or the closest emergency department or a call to 911.    Problems Addressed:  Chronic low back pain, unspecified back pain laterality, unspecified whether sciatica present: chronic illness or injury with exacerbation, progression, or side effects of treatment     Details: Differential diagnosis includes fracture, cauda equina syndrome, spondylolisthesis, arthritis, spinal epidural abscess, diskitis/osteomyelitis    Amount and/or Complexity of Data Reviewed  External Data Reviewed: radiology and notes.    Risk  OTC drugs.  Prescription drug management.        CLINICAL IMPRESSION:  1. Chronic low back pain, unspecified back pain laterality, unspecified whether sciatica present        DISPOSITION:   ED Disposition  Condition    Discharge Stable            ED Prescriptions    None       Follow-up Information       Follow up With Specialties Details Why Contact Info    Magdi Rivera,  Internal Medicine Schedule an appointment as soon as possible for a visit   [address] [facility] [address]  [phone]      [facility] Emergency Medicine  If symptoms worsen [address] [facility] [id number] [phone]               Kelvin Salcido,   07/19/23 0616       Kelvin Salcido,   07/19/23 0617

## 2023-07-19 ENCOUNTER — HOSPITAL ENCOUNTER (EMERGENCY)
Facility: HOSPITAL | Age: 68
Discharge: HOME | End: 2023-07-19
Attending: INTERNAL MEDICINE
Payer: MEDICARE

## 2023-07-19 VITALS
TEMPERATURE: 98 F | SYSTOLIC BLOOD PRESSURE: 167 MMHG | RESPIRATION RATE: 18 BRPM | BODY MASS INDEX: 35.53 KG/M2 | HEIGHT: 69 IN | WEIGHT: 239.88 LBS | DIASTOLIC BLOOD PRESSURE: 89 MMHG | HEART RATE: 82 BPM | OXYGEN SATURATION: 99 %

## 2023-07-19 DIAGNOSIS — M54.50 CHRONIC LEFT-SIDED LOW BACK PAIN WITHOUT SCIATICA: Primary | ICD-10-CM

## 2023-07-19 DIAGNOSIS — G89.29 CHRONIC LEFT-SIDED LOW BACK PAIN WITHOUT SCIATICA: Primary | ICD-10-CM

## 2023-07-19 PROCEDURE — 99283 EMERGENCY DEPT VISIT LOW MDM: CPT

## 2023-07-19 PROCEDURE — 25000003 PHARM REV CODE 250: Performed by: PHYSICIAN ASSISTANT

## 2023-07-19 RX ORDER — HYDROCODONE BITARTRATE AND ACETAMINOPHEN 5; 325 MG/1; MG/1
1 TABLET ORAL
Status: COMPLETED | OUTPATIENT
Start: 2023-07-19 | End: 2023-07-19

## 2023-07-19 RX ADMIN — HYDROCODONE BITARTRATE AND ACETAMINOPHEN 1 TABLET: 5; 325 TABLET ORAL at 01:07

## 2023-07-19 NOTE — ED PROVIDER NOTES
Encounter Date: 7/19/2023     History     Chief Complaint   Patient presents with    Back Pain     PT IN /[facility] W CO CHRONIC LOWER BACK PAIN.      Patient well known to this ED with pmhx as delineated below presents today via EMS c/o chronic left low back pain. Pain is worse with movement. Relieved with home pain medication. Patient says he has plenty of his pain medication at home and it really helps his pain, but he didn't take any this morning. He says he came to the ED for a dose of his pain medication. Denies recent injury or trauma, focal weakness, paresthesia, bladder/bowel incontinence, saddle numbness, dysuria, hematuria.     The history is provided by the patient. No  was used.   Review of patient's allergies indicates:  No Known Allergies  Past Medical History:   Diagnosis Date    Arthritis     Atrial fibrillation     BPH (benign prostatic hyperplasia)     Cardiac arrest     Coronary artery disease     Cyst, kidney, acquired     Diverticulosis     Hyperlipidemia     Hypertension     MI (myocardial infarction)     Obesity     Steatosis of liver      Past Surgical History:   Procedure Laterality Date    A-V CARDIAC PACEMAKER INSERTION Right     CARDIAC CATHETERIZATION      COLONOSCOPY W/ BIOPSIES      excision of colon       Family History   Problem Relation Age of Onset    Hypertension Mother     Hypertension Father     Hypertension Sister      Social History     Tobacco Use    Smoking status: Former    Smokeless tobacco: Never   Substance Use Topics    Alcohol use: Not Currently    Drug use: Not Currently     Review of Systems   Constitutional:  Negative for chills and fever.   Respiratory:  Negative for cough, chest tightness and shortness of breath.    Cardiovascular:  Negative for chest pain, palpitations and leg swelling.   Gastrointestinal:  Negative for abdominal pain, constipation, diarrhea, nausea and vomiting.   Genitourinary:  Negative for dysuria, flank pain and hematuria.    Musculoskeletal:  Positive for back pain. Negative for gait problem.   Skin:  Negative for rash.   Neurological:  Negative for syncope, light-headedness and headaches.   All other systems reviewed and are negative.    Physical Exam     Initial Vitals [07/19/23 1153]   BP Pulse Resp Temp SpO2   (!) 172/102 82 18 97.9 °F (36.6 °C) 99 %      MAP       --         Physical Exam    Nursing note and vitals reviewed.  Constitutional: He appears well-developed and well-nourished. He is not diaphoretic. No distress.   HENT:   Head: Normocephalic and atraumatic.   Mouth/Throat: Oropharynx is clear and moist. No oropharyngeal exudate.   Eyes: Conjunctivae and EOM are normal.   Neck: Neck supple.   Normal range of motion.  Cardiovascular:  Normal rate, regular rhythm, normal heart sounds and intact distal pulses.           Pulmonary/Chest: Breath sounds normal. No respiratory distress.   Abdominal: Abdomen is soft. He exhibits no distension. There is no abdominal tenderness. There is no rebound and no guarding.   Musculoskeletal:         General: No tenderness or edema. Normal range of motion.      Cervical back: Normal range of motion and neck supple.      Comments: No lumbar vertebral point tenderness. No step offs.      Neurological: He is alert and oriented to person, place, and time. He has normal strength. No sensory deficit. GCS score is 15. GCS eye subscore is 4. GCS verbal subscore is 5. GCS motor subscore is 6.   Skin: Skin is warm and dry. Capillary refill takes less than 2 seconds.   Psychiatric: He has a normal mood and affect.       ED Course   Procedures  Labs Reviewed - No data to display       Imaging Results    None          Medications   HYDROcodone-acetaminophen 5-325 mg per tablet 1 tablet (1 tablet Oral Given 7/19/23 1308)     Medical Decision Making:   History:   Old Records Summarized: other records, records from clinic visits and records from previous admission(s).  Initial Assessment:   Patient with  chronic low back pain relieved with home pain medication. No red flag symptoms. No neuro deficits.   Differential Diagnosis:   Chronic low back pain, lumbar radiculopathy, UTI  Patient is non-toxic appearing. Vitals stable. No neuro deficits. Recommend patient take his prescribed pain medication as needed for his chronic pain. Advised to f/u with pcp. ED precautions given.      APC / Resident Notes:   I was not physically present during the history, exam and disposition of this patient.  I was available at all times for consultation. (Melodie)                    Clinical Impression:   Final diagnoses:  [M54.50, G89.29] Chronic left-sided low back pain without sciatica (Primary)        ED Disposition Condition    Discharge Stable          ED Prescriptions    None       Follow-up Information       Follow up With Specialties Details Why Contact Info    [facility] Emergency Dept Emergency Medicine  If symptoms worsen return to ED immediately [address] [facility] [id number]  [phone]    Magdi Rivera, DO Internal Medicine In 2 days  [address] [facility] [address]  [phone]               KAVIN Dunn  07/19/23 1302       Humberto Sewell MD  07/19/23 6149

## 2023-08-01 ENCOUNTER — CLINICAL SUPPORT (OUTPATIENT)
Dept: CARDIOLOGY | Facility: CLINIC | Age: 68
End: 2023-08-01
Payer: MEDICARE

## 2023-08-01 DIAGNOSIS — Z95.810 ICD (IMPLANTABLE CARDIOVERTER-DEFIBRILLATOR) IN PLACE: Primary | ICD-10-CM

## 2023-08-01 PROCEDURE — 99211 OFF/OP EST MAY X REQ PHY/QHP: CPT | Mod: PBBFAC

## 2023-08-08 ENCOUNTER — OFFICE VISIT (OUTPATIENT)
Dept: CARDIOLOGY | Facility: CLINIC | Age: 68
End: 2023-08-08
Payer: MEDICARE

## 2023-08-08 VITALS
OXYGEN SATURATION: 100 % | BODY MASS INDEX: 34.22 KG/M2 | HEART RATE: 97 BPM | DIASTOLIC BLOOD PRESSURE: 108 MMHG | SYSTOLIC BLOOD PRESSURE: 160 MMHG | WEIGHT: 239 LBS | HEIGHT: 70 IN | RESPIRATION RATE: 18 BRPM

## 2023-08-08 DIAGNOSIS — I48.11 LONGSTANDING PERSISTENT ATRIAL FIBRILLATION: Primary | ICD-10-CM

## 2023-08-08 DIAGNOSIS — Z86.79 H/O VENTRICULAR FIBRILLATION: ICD-10-CM

## 2023-08-08 DIAGNOSIS — I49.01 CARDIAC ARREST WITH VENTRICULAR FIBRILLATION: ICD-10-CM

## 2023-08-08 DIAGNOSIS — I10 HYPERTENSION, UNSPECIFIED TYPE: ICD-10-CM

## 2023-08-08 DIAGNOSIS — I25.10 CORONARY ARTERY DISEASE INVOLVING NATIVE HEART WITHOUT ANGINA PECTORIS, UNSPECIFIED VESSEL OR LESION TYPE: ICD-10-CM

## 2023-08-08 DIAGNOSIS — Z95.0 CARDIAC PACEMAKER IN SITU: ICD-10-CM

## 2023-08-08 DIAGNOSIS — E78.5 HYPERLIPIDEMIA LDL GOAL <70: ICD-10-CM

## 2023-08-08 DIAGNOSIS — I44.1 SECOND DEGREE AV BLOCK: ICD-10-CM

## 2023-08-08 DIAGNOSIS — I46.9 CARDIAC ARREST WITH VENTRICULAR FIBRILLATION: ICD-10-CM

## 2023-08-08 PROBLEM — Z86.718 HISTORY OF DEEP VEIN THROMBOSIS (DVT) OF LOWER EXTREMITY: Status: ACTIVE | Noted: 2023-08-08

## 2023-08-08 PROBLEM — Z79.01 CURRENT USE OF LONG TERM ANTICOAGULATION: Status: ACTIVE | Noted: 2023-08-08

## 2023-08-08 PROCEDURE — 99215 OFFICE O/P EST HI 40 MIN: CPT | Mod: PBBFAC

## 2023-08-08 NOTE — PATIENT INSTRUCTIONS
Will refer to EP in [facility] for BiV upgrade  Return to clinic for device check on 11.7.23  Follow up in general cardiology clinic in 3 months or sooner if needed  Follow up with PCP/other specialties as directed  ED precautions given

## 2023-08-08 NOTE — PROGRESS NOTES
CHIEF COMPLAINT:   Chief Complaint   Patient presents with    3 month follow up      Complaints he is tired and lower extremity edema.                                                   HPI:  Delio Daniel Jr. 68 y.o. male w/ PMH of  has a past medical history of Arthritis, Atrial fibrillation, BPH (benign prostatic hyperplasia), Cardiac arrest, Coronary artery disease with STEMI in 2003, HFpEF (55%), 2nd degree AVB s/p PPM in 2017 since upgraded to dcICD in May 2018 due to VFIB arrest in [facility] 2018 due to prolonged QT and hypokalemia, Cyst, kidney, acquired, Diverticulosis, Hyperlipidemia, Hypertension, MI (myocardial infarction), Obesity, and Steatosis of liver., who presents to Cardiology Clinic for follow up and ongoing care.  At his last appointment patient presented for device interrogation with continued episodes of SVT, AFib in the 300s, and 32 nonsustained episodes.  It was also noted that he had continued mode switching.  He denied any cardiac complaints at that time.  Of note he was referred to Dr. Hernandez for AV steven ablation, as he will likely need BiV device upgrade, however patient did not follow up as recommended.    Today the patient complains of feeling fatigued and has lower extremity edema.  He denies any chest pain, shortness of  breath, palpitations, PND, orthopnea, lightheadedness, dizziness, or syncope.  Most recent device check on August 1st revealed 31 nonsustained episodes, 60 VT episodes, two recorded as SVT that appeared to be ventricular response to Afib.  Patient denies any issues with current ICD.  He states that he is able to complete his ADLs without any issues or ischemic symptoms.  He states that his activity is minimal.  He denies any tobacco use.  He reports compliance with all medications, however he did not take blood pressure medications until he arrived to office today.                                                                                                                                                                                                                                                                                                                                                                                                                                                                         CARDIAC TESTING:  Results for orders placed during the hospital encounter of 03/10/23    Echo    Interpretation Summary  · The left ventricle is normal in size with concentric remodeling and low normal systolic function.  · With low normal right ventricular systolic function.  · The estimated ejection fraction is 50%.  · Atrial fibrillation observed.  · Moderate-to-severe mitral regurgitation.  · Normal left ventricular diastolic function.  · Moderate to severe left atrial enlargement.    Consider MENDEL if patient is willing to have MV repair    No results found for this or any previous visit.     Results for orders placed in visit on 05/25/18    CATH LAB PROCEDURE       Patient Active Problem List   Diagnosis    Neuroendocrine carcinoma of small bowel    Nodule of left lung    Longstanding persistent atrial fibrillation    Hypertension    Hyperlipidemia LDL goal <70    Hypokalemia    Coronary artery disease involving native heart without angina pectoris    Second degree AV block    Cardiac arrest with ventricular fibrillation    Cardiac pacemaker in situ     Past Surgical History:   Procedure Laterality Date    A-V CARDIAC PACEMAKER INSERTION Right     CARDIAC CATHETERIZATION      COLONOSCOPY W/ BIOPSIES      excision of colon       Social History     Socioeconomic History    Marital status: Single   Tobacco Use    Smoking status: Former     Current packs/day: 0.00    Smokeless tobacco: Never   Substance and Sexual Activity    Alcohol use: Not Currently    Drug use: Not Currently    Sexual activity: Not Currently     Social Determinants of Health     Financial Resource  Strain: Low Risk  (10/19/2022)    Overall Financial Resource Strain (CARDIA)     Difficulty of Paying Living Expenses: Not hard at all   Food Insecurity: No Food Insecurity (10/19/2022)    Hunger Vital Sign     Worried About Running Out of Food in the Last Year: Never true     Ran Out of Food in the Last Year: Never true   Transportation Needs: No Transportation Needs (10/19/2022)    PRAPARE - Transportation     Lack of Transportation (Medical): No     Lack of Transportation (Non-Medical): No   Physical Activity: Sufficiently Active (10/19/2022)    Exercise Vital Sign     Days of Exercise per Week: 6 days     Minutes of Exercise per Session: 60 min   Stress: No Stress Concern Present (10/19/2022)    [employer] - Occupational Stress Questionnaire     Feeling of Stress : Not at all   Social Connections: Unknown (10/19/2022)    Social Connection and Isolation Panel [NHANES]     Frequency of Communication with Friends and Family: More than three times a week     Frequency of Social Gatherings with Friends and Family: More than three times a week     Attends Oriental orthodox Services: More than 4 times per year     Active Member of Clubs or Organizations: No     Attends Club or Organization Meetings: Never   Housing Stability: Low Risk  (10/19/2022)    Housing Stability Vital Sign     Unable to Pay for Housing in the Last Year: No     Number of Places Lived in the Last Year: 1     Unstable Housing in the Last Year: No        Family History   Problem Relation Age of Onset    Hypertension Mother     Hypertension Father     Hypertension Sister      Review of patient's allergies indicates:  No Known Allergies      ROS:  Review of Systems   Constitutional:  Positive for malaise/fatigue.   HENT: Negative.     Eyes: Negative.    Respiratory: Negative.  Negative for shortness of breath.    Cardiovascular:  Positive for leg swelling. Negative for chest pain, palpitations, orthopnea, claudication and PND.  "  Gastrointestinal: Negative.    Genitourinary: Negative.    Musculoskeletal: Negative.    Skin: Negative.    Neurological: Negative.  Negative for weakness.   Endo/Heme/Allergies: Negative.    Psychiatric/Behavioral: Negative.                                                                                                                                                                                  Negative except as stated in the history of present illness. See HPI for details.    PHYSICAL EXAM:  Visit Vitals  BP (!) 177/122 (BP Location: Right arm, Patient Position: Sitting, BP Method: Large (Automatic))   Pulse 97   Resp 18   Ht 5' 9.69" (1.77 m)   Wt 108.4 kg (239 lb)   SpO2 100%   BMI 34.60 kg/m²       Physical Exam  Constitutional:       Appearance: Normal appearance.   HENT:      Head: Normocephalic.   Eyes:      Pupils: Pupils are equal, round, and reactive to light.   Cardiovascular:      Rate and Rhythm: Normal rate and regular rhythm.      Pulses: Normal pulses.      Heart sounds: No murmur heard.  Pulmonary:      Effort: Pulmonary effort is normal. No respiratory distress.   Abdominal:      General: There is no distension.   Musculoskeletal:         General: Normal range of motion.      Right lower leg: Edema present.      Left lower leg: Edema present.   Skin:     General: Skin is warm and dry.   Neurological:      General: No focal deficit present.      Mental Status: He is alert and oriented to person, place, and time.   Psychiatric:         Mood and Affect: Mood normal.         Behavior: Behavior normal.         Current Outpatient Medications   Medication Instructions    albuterol (PROVENTIL/VENTOLIN HFA) 90 mcg/actuation inhaler 2 puffs, Inhalation, Every 6 hours PRN, Rescue    aspirin 81 MG Chew chew and swallow 1 tablet by mouth daily    atorvastatin (LIPITOR) 40 mg, Oral, Daily    baclofen (LIORESAL) 10 mg, Oral, 3 times daily PRN    cetirizine (ZYRTEC) 10 mg, Oral, Daily    diltiaZEM " (CARDIZEM CD) 360 mg, Oral, Daily    famotidine (PEPCID) 20 mg, Oral, 2 times daily    fluticasone propionate (FLONASE) 100 mcg, Each Nostril, Daily    GI cocktail antac/dicyc/lidoc 30 mLs, Swish & Swallow, Every 6 hours PRN    hyoscyamine (ANASPAZ,LEVSIN) 125 mcg, Oral, Every 6 hours PRN    LIDOcaine (LIDODERM) 5 % 1 patch, Transdermal, Daily, Remove & Discard patch within 12 hours or as directed by MD    losartan (COZAAR) 50 mg, Oral, Daily    methocarbamoL (ROBAXIN) 750 mg, Oral, 3 times daily    metoprolol succinate (TOPROL-XL) 200 mg, Oral, 2 times daily    NIFEdipine (PROCARDIA-XL) 30 mg, Oral, Daily    omeprazole (PRILOSEC) 20 mg, Oral, Daily    ondansetron (ZOFRAN-ODT) 4 mg, Oral, Every 12 hours PRN    potassium chloride (K-TAB) 20 mEq 20 mEq, Oral, 2 times daily    promethazine (PHENERGAN) 25 mg, Oral, Every 6 hours PRN    spironolactone (ALDACTONE) 50 mg, Oral, Daily    traMADoL (ULTRAM) 50 mg, Oral, Every 6 hours PRN    vitamin D (VITAMIN D3) 1,000 Units, Oral, Daily    XARELTO 20 mg Tab TAKE 1 TABLET BY MOUTH DAILY with SUPPER        All medications, laboratory studies, cardiac diagnostic imaging reviewed.     Lab Results   Component Value Date    LDL 21.00 (L) 07/03/2021    LDL 28.00 (L) 05/03/2021    TRIG 110 07/03/2021    TRIG 73 05/03/2021    CREATININE 1.23 (H) 07/03/2023    MG 2.00 03/14/2023    K 3.3 (L) 05/23/2023        ASSESSMENT/PLAN:  CAD, MI in 2003  - Denies any chest pain   - Continue atorvastatin 40, aspirin 81, and metoprolol succinate 200 mg BID  - METs 3.5    Persistent Afib  - Denies any palpitations, lightheadedness, dizziness, or syncopal episodes  - Device check on 8.1.23, revealed RV pacing at 34%  - Continue Diltiazem 240 mg and Toprol 200 mg BID  - Continues to have 60+ episodes of VT despite being nearly maximized on beta blocker and CCB for rate control  - Was referred to Dr. Hernandez at last appointment, however he did not go to visit.  Will place a referral to EP in [facility] for  AV steven ablation, will likely need BiV device upgrade     2nd degree AVB s/p PPM-->upgrade to ICD for secondary prevention 2/2 VFib arrest  -RV paced 34%  -Battery life 1.3-1.7 years  -Routine check q3 months (next check 11.7.23)     Hypertension  - BP above goal today  - Counseled on medication compliance  - Will have patient return to clinic in 2 weeks for a nurse visit/BP check  - Continue Losartan 50 mg qd, Toprol 200 mg BID, and aldactone 50 mg  - Counseled on low-sodium, heart healthy diet and exercise as tolerated     HLD  - LDL 21  - Continue atorvastatin 40  - Counseled on low-cholesterol, heart healthy diet and exercise as tolerated    Hypokalemia  - Had normal renin and aldosterone levels       Will refer to EP in [facility] for BiV upgrade  Return to clinic for device check on 11.7.23  Follow up in general cardiology clinic in 3 months or sooner if needed  Follow up with PCP/other specialties as directed  ED precautions given

## 2023-08-09 ENCOUNTER — HOSPITAL ENCOUNTER (EMERGENCY)
Facility: HOSPITAL | Age: 68
Discharge: HOME | End: 2023-08-09
Attending: INTERNAL MEDICINE
Payer: MEDICARE

## 2023-08-09 ENCOUNTER — OFFICE VISIT (OUTPATIENT)
Dept: HEMATOLOGY/ONCOLOGY | Facility: CLINIC | Age: 68
End: 2023-08-09
Attending: INTERNAL MEDICINE
Payer: MEDICARE

## 2023-08-09 VITALS
WEIGHT: 242.63 LBS | OXYGEN SATURATION: 100 % | RESPIRATION RATE: 20 BRPM | BODY MASS INDEX: 34.74 KG/M2 | DIASTOLIC BLOOD PRESSURE: 110 MMHG | TEMPERATURE: 98 F | SYSTOLIC BLOOD PRESSURE: 170 MMHG | HEART RATE: 78 BPM | HEIGHT: 70 IN

## 2023-08-09 VITALS
OXYGEN SATURATION: 99 % | SYSTOLIC BLOOD PRESSURE: 170 MMHG | BODY MASS INDEX: 35.85 KG/M2 | HEIGHT: 69 IN | WEIGHT: 242.06 LBS | HEART RATE: 73 BPM | DIASTOLIC BLOOD PRESSURE: 117 MMHG | RESPIRATION RATE: 20 BRPM | TEMPERATURE: 98 F

## 2023-08-09 DIAGNOSIS — Z79.01 CURRENT USE OF LONG TERM ANTICOAGULATION: ICD-10-CM

## 2023-08-09 DIAGNOSIS — I10 UNCONTROLLED HYPERTENSION: Primary | ICD-10-CM

## 2023-08-09 DIAGNOSIS — C7A.8 NEUROENDOCRINE CARCINOMA OF SMALL BOWEL: Primary | ICD-10-CM

## 2023-08-09 DIAGNOSIS — C7A.8 PRIMARY NEUROENDOCRINE CARCINOMA OF COLON: ICD-10-CM

## 2023-08-09 DIAGNOSIS — R91.1 NODULE OF LEFT LUNG: ICD-10-CM

## 2023-08-09 DIAGNOSIS — Z95.0 CARDIAC PACEMAKER IN SITU: ICD-10-CM

## 2023-08-09 DIAGNOSIS — E87.6 HYPOKALEMIA: Primary | ICD-10-CM

## 2023-08-09 DIAGNOSIS — I16.0 HYPERTENSIVE URGENCY: ICD-10-CM

## 2023-08-09 DIAGNOSIS — I48.11 LONGSTANDING PERSISTENT ATRIAL FIBRILLATION: ICD-10-CM

## 2023-08-09 DIAGNOSIS — Z86.718 HISTORY OF DEEP VEIN THROMBOSIS (DVT) OF LOWER EXTREMITY: ICD-10-CM

## 2023-08-09 PROCEDURE — 1101F PR PT FALLS ASSESS DOC 0-1 FALLS W/OUT INJ PAST YR: ICD-10-PCS | Mod: CPTII,,, | Performed by: INTERNAL MEDICINE

## 2023-08-09 PROCEDURE — 3077F SYST BP >= 140 MM HG: CPT | Mod: CPTII,,, | Performed by: INTERNAL MEDICINE

## 2023-08-09 PROCEDURE — 4010F ACE/ARB THERAPY RXD/TAKEN: CPT | Mod: CPTII,,, | Performed by: INTERNAL MEDICINE

## 2023-08-09 PROCEDURE — 3288F FALL RISK ASSESSMENT DOCD: CPT | Mod: CPTII,,, | Performed by: INTERNAL MEDICINE

## 2023-08-09 PROCEDURE — 3008F PR BODY MASS INDEX (BMI) DOCUMENTED: ICD-10-PCS | Mod: CPTII,,, | Performed by: INTERNAL MEDICINE

## 2023-08-09 PROCEDURE — 99215 OFFICE O/P EST HI 40 MIN: CPT | Mod: PBBFAC | Performed by: INTERNAL MEDICINE

## 2023-08-09 PROCEDURE — 3288F PR FALLS RISK ASSESSMENT DOCUMENTED: ICD-10-PCS | Mod: CPTII,,, | Performed by: INTERNAL MEDICINE

## 2023-08-09 PROCEDURE — 99283 EMERGENCY DEPT VISIT LOW MDM: CPT | Mod: 27

## 2023-08-09 PROCEDURE — 3080F DIAST BP >= 90 MM HG: CPT | Mod: CPTII,,, | Performed by: INTERNAL MEDICINE

## 2023-08-09 PROCEDURE — 1101F PT FALLS ASSESS-DOCD LE1/YR: CPT | Mod: CPTII,,, | Performed by: INTERNAL MEDICINE

## 2023-08-09 PROCEDURE — 1160F RVW MEDS BY RX/DR IN RCRD: CPT | Mod: CPTII,,, | Performed by: INTERNAL MEDICINE

## 2023-08-09 PROCEDURE — 1159F MED LIST DOCD IN RCRD: CPT | Mod: CPTII,,, | Performed by: INTERNAL MEDICINE

## 2023-08-09 PROCEDURE — 1159F PR MEDICATION LIST DOCUMENTED IN MEDICAL RECORD: ICD-10-PCS | Mod: CPTII,,, | Performed by: INTERNAL MEDICINE

## 2023-08-09 PROCEDURE — 99214 PR OFFICE/OUTPT VISIT, EST, LEVL IV, 30-39 MIN: ICD-10-PCS | Mod: S$PBB,,, | Performed by: INTERNAL MEDICINE

## 2023-08-09 PROCEDURE — 3080F PR MOST RECENT DIASTOLIC BLOOD PRESSURE >= 90 MM HG: ICD-10-PCS | Mod: CPTII,,, | Performed by: INTERNAL MEDICINE

## 2023-08-09 PROCEDURE — 99214 OFFICE O/P EST MOD 30 MIN: CPT | Mod: S$PBB,,, | Performed by: INTERNAL MEDICINE

## 2023-08-09 PROCEDURE — 3077F PR MOST RECENT SYSTOLIC BLOOD PRESSURE >= 140 MM HG: ICD-10-PCS | Mod: CPTII,,, | Performed by: INTERNAL MEDICINE

## 2023-08-09 PROCEDURE — 3008F BODY MASS INDEX DOCD: CPT | Mod: CPTII,,, | Performed by: INTERNAL MEDICINE

## 2023-08-09 PROCEDURE — 1126F PR PAIN SEVERITY QUANTIFIED, NO PAIN PRESENT: ICD-10-PCS | Mod: CPTII,,, | Performed by: INTERNAL MEDICINE

## 2023-08-09 PROCEDURE — 1126F AMNT PAIN NOTED NONE PRSNT: CPT | Mod: CPTII,,, | Performed by: INTERNAL MEDICINE

## 2023-08-09 PROCEDURE — 1160F PR REVIEW ALL MEDS BY PRESCRIBER/CLIN PHARMACIST DOCUMENTED: ICD-10-PCS | Mod: CPTII,,, | Performed by: INTERNAL MEDICINE

## 2023-08-09 PROCEDURE — 4010F PR ACE/ARB THEARPY RXD/TAKEN: ICD-10-PCS | Mod: CPTII,,, | Performed by: INTERNAL MEDICINE

## 2023-08-09 PROCEDURE — 25000003 PHARM REV CODE 250: Performed by: PHYSICIAN ASSISTANT

## 2023-08-09 RX ORDER — SPIRONOLACTONE 25 MG/1
50 TABLET ORAL
Status: COMPLETED | OUTPATIENT
Start: 2023-08-09 | End: 2023-08-09

## 2023-08-09 RX ORDER — LOSARTAN POTASSIUM 25 MG/1
50 TABLET ORAL
Status: COMPLETED | OUTPATIENT
Start: 2023-08-09 | End: 2023-08-09

## 2023-08-09 RX ORDER — METOPROLOL SUCCINATE 50 MG/1
200 TABLET, EXTENDED RELEASE ORAL
Status: COMPLETED | OUTPATIENT
Start: 2023-08-09 | End: 2023-08-09

## 2023-08-09 RX ORDER — POTASSIUM CHLORIDE 20 MEQ/1
40 TABLET, EXTENDED RELEASE ORAL DAILY
Qty: 28 TABLET | Refills: 0 | Status: SHIPPED | OUTPATIENT
Start: 2023-08-09 | End: 2023-08-23

## 2023-08-09 RX ADMIN — METOPROLOL SUCCINATE 200 MG: 50 TABLET, FILM COATED, EXTENDED RELEASE ORAL at 02:08

## 2023-08-09 RX ADMIN — LOSARTAN POTASSIUM 50 MG: 25 TABLET, FILM COATED ORAL at 02:08

## 2023-08-09 RX ADMIN — SPIRONOLACTONE 50 MG: 25 TABLET, FILM COATED ORAL at 02:08

## 2023-08-09 NOTE — PROGRESS NOTES
History:  Past Medical History:   Diagnosis Date    Arthritis     Atrial fibrillation     BPH (benign prostatic hyperplasia)     Cardiac arrest     Coronary artery disease     Cyst, kidney, acquired     Diverticulosis     Hyperlipidemia     Hypertension     MI (myocardial infarction)     Obesity     Steatosis of liver    Past medical history: Atrial fibrillation.  Coronary artery disease.  History of cardiac arrest with ventricular fibrillation.  Hypertension.  Dyslipidemia.  MI in 2003.  Obesity.  Status post CPR 03/08/2018. 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 HFpEF (EF >55% on echo 11/2018).  History of second-degree AV block requiring PPM.  Social history: Single.  Has 9 children.  Lives in [place].  Used to work as a  at [employer].  Smoked up to 5 packs of cigarettes daily for 10-20 years; quit in 1970s.  Used to drink vodka every weekend until he got drunk; drank for about 35 years, then quit.  Used to smoke marijuana.  Family history: Negative for malignancy.  Health maintenance: He is unsure when he had last colonoscopy performed, probably 10 years back, at [facility], apparently unremarkable.   Past Surgical History:   Procedure Laterality Date    A-V CARDIAC PACEMAKER INSERTION Right     CARDIAC CATHETERIZATION      COLONOSCOPY W/ BIOPSIES      excision of colon        Social History     Socioeconomic History    Marital status: Single   Tobacco Use    Smoking status: Former     Current packs/day: 0.00    Smokeless tobacco: Never   Substance and Sexual Activity    Alcohol use: Not Currently    Drug use: Not Currently    Sexual activity: Not Currently     Social Determinants of Health     Financial Resource Strain: Low Risk  (10/19/2022)    Overall Financial Resource Strain (CARDIA)     Difficulty of Paying Living Expenses: Not hard at all   Food Insecurity: No Food Insecurity (10/19/2022)    Hunger Vital Sign     Worried About Running Out of Food in the Last Year: Never true     Ran Out  of Food in the Last Year: Never true   Transportation Needs: No Transportation Needs (10/19/2022)    PRAPARE - Transportation     Lack of Transportation (Medical): No     Lack of Transportation (Non-Medical): No   Physical Activity: Sufficiently Active (10/19/2022)    Exercise Vital Sign     Days of Exercise per Week: 6 days     Minutes of Exercise per Session: 60 min   Stress: No Stress Concern Present (10/19/2022)    [employer] [place] [place] [employer] - Occupational Stress Questionnaire     Feeling of Stress : Not at all   Social Connections: Unknown (10/19/2022)    Social Connection and Isolation Panel [NHANES]     Frequency of Communication with Friends and Family: More than three times a week     Frequency of Social Gatherings with Friends and Family: More than three times a week     Attends Denominational Services: More than 4 times per year     Active Member of Clubs or Organizations: No     Attends Club or Organization Meetings: Never   Housing Stability: Low Risk  (10/19/2022)    Housing Stability Vital Sign     Unable to Pay for Housing in the Last Year: No     Number of Places Lived in the Last Year: 1     Unstable Housing in the Last Year: No      Family History   Problem Relation Age of Onset    Hypertension Mother     Hypertension Father     Hypertension Sister         Reason for Follow-up:  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        Oncologic/Hematologic History:  Oncology History   Neuroendocrine carcinoma of small bowel   11/2/2018 Cancer Staged    Staging form: Small Intestine - Other Histologies, AJCC 8th Edition  - Pathologic stage from 11/2/2018: pT3, pN2, cM0     5/25/2022 Initial Diagnosis    Neuroendocrine carcinoma of small bowel      63-year-old gentleman referred from surgery clinic for evaluation and management of neuroendocrine carcinoma.     Oncologic history:  # pT3,4 pN2 MX, at least stage  III, well-differentiated neuroendocrine tumor of small bowel, multifocal, grade 1; large mesenteric mass (3.8 cm); 2:21 lymph nodes involved; small multifocal areas of tumor in the efren-intestinal adipose tissue.   Presented with small bowel obstruction.  Status post resection of 127 cm of small bowel and mesenteric mass in the base of the mesentery, on 11/02/2018.   -Our request for contrast-enhanced chest CT for staging, was denied   -12/26/2018: CT A/P with contrast: 25 mm area of mesenteric soft tissue may reflect residual mass or postsurgical change; 2 millimetric hepatic lesions are too small to characterize, stable from October 2018, suggest close attention on follow-up.   -12/27/2018: Colonoscopy: 3 mm hyperplastic sigmoid polyp; no malignancy   -12/10/2018: Chromogranin A level normal   -12/12/2018: 24-hour urine 5-HIAA level normal   -02/15/2019: Whole-body Ga-dotatate PET/CT: No findings to suggest somatostatin receptor avid disease   -05/26/2019-05/29/2019: Brief hospitalization with early small bowel obstruction, managed conservatively   -05/28/2019: Small bowel follow-through: Prompt passage of contrast through the small bowel.  Contrast passed quickly through small bowel and reached colon within 15 minutes; further contrast progression to rectum within 1 hour.  No discrete small bowel transition point.   -06/28/2019: Octreotide scan: No evidence of neuroendocrine tumor   -07/11/2019: X-ray abdomen flat and erect (abdominal pain): No acute intra-abdominal abnormality  -11/01/2019: Multiphasic contrast-enhanced CTs abdomen and pelvis and chest CT with contrast for surveillance: No evidence of residual, recurrent, or metastatic disease in chest, abdomen, or pelvis  -02/03/2020: TTE: LVEF 55%; atrial fibrillation  -Follows up with cardiology for history of hypertension, dyslipidemia, CAD, history of NSTEMI in 2003, paroxysmal atrial fibrillation, HFpEF, second-degree AV block s/p [facility] PPM 11/13/2017  (upgraded to dual-chamber ICD 05/2018 secondary to V. fib arrest in 03/2018 in the setting of prolonged QT and hypokalemia)  -05/03/2021: Surveillance CT C/A/P with contrast (comparison: 02/02/2020): No new suspicious findings in C/A/P; decreased size of previously discussed mediastinal lymph nodes (AP window lymph nodes 6 mm, previously 8 mm)  -01/07/2022: CT A/P with contrast (comparison: 05/03/2021): Hepatic steatosis; small gallstones; retroperitoneal mildly enlarged lymph node, unchanged  -07/07/2022:  Surveillance CTs C/A/P with contrast (comparison:  CT A/P 01/07/2022; CT C/A/P 05/03/2021):  No recurrence or metastasis  -09/07/2022:  CT abdomen pelvis with contrast (abdominal pain) (comparison:  CT 07/07/2022):  -10/10/2022: Noncontrast chest CT (comparison:  07/07/2022):  No acute process identified  1. Interval resolution of previous left lower lobe ground-glass nodule, suggestive of infectious or inflammatory etiology.  2. No evidence of new or worsening intrathoracic process.         # History of coronary artery disease, atrial fibrillation, history of cardiac arrest with ventricular fibrillation, MI in 2003, history of previous DVTs, status post CPR 03/08/2018, on anticoagulation for history of DVTs and atrial fibrillation, history of second-degree AV block requiring PPM.        12/10/2018:   Presents for initial oncology consultation, accompanied by his 3 sisters.  Overall, doing well except for postoperative abdominal pain, 4 on a scale of 1-10, which keeps improving.  Mild fatigue.  No symptoms of carcinoid syndrome like flushing, diarrhea, or bronchoconstriction.  No weakness, fatigue, malaise, fevers, chills, anorexia, unintentional weight loss, nausea, vomiting, hematemesis, melena, hematochezia, etc.    Interval History:  [No matching plan found]   [No matching plan found]     10/13/2022:  -09/07/2022:  CT abdomen pelvis with contrast (abdominal pain) (comparison:  CT 07/07/2022):  -10/10/2022:  Noncontrast chest CT (comparison:  07/07/2022):  No acute process identified  1. Interval resolution of previous left lower lobe ground-glass nodule, suggestive of infectious or inflammatory etiology.  2. No evidence of new or worsening intrathoracic process.  Presents for a follow-up visit.  Doing very well.  No complaints.  No abdominal pain, nausea, vomiting, GI bleeding, constipation, diarrhea, anorexia, unintentional weight loss, etc..  ECOG 1.    08/09/2023:   Potassium 3.1, low.     Check magnesium level.     Start potassium chloride 40 mEq p.o. q.day x2 weeks; no refills  Recheck CMP and magnesium level in 2 weeks.  -follows up with Cardiology for multiple issues  Presents for a follow-up visit.  In no acute discomfort.  Says that he did not take his antihypertensive this morning because he was rushing to the hospital.  Blood pressure is elevated.  He denies chest pain, dyspnea, palpitations, dizziness, vision impairment, or neurological symptoms.  Blood pressure 166/113 right arm; 161/170 left arm; manual blood pressure 170/110; in no acute discomfort; no symptoms; will refer him to emergency department for the needful.  Surveillance CT scans are pending which we will schedule.  Denies abdominal pain, nausea, vomiting, or GI bleeding.  Good appetite.  ECOG 1.       Medications:  Current Outpatient Medications on File Prior to Visit   Medication Sig Dispense Refill    aspirin 81 MG Chew chew and swallow 1 tablet by mouth daily 90 tablet 3    atorvastatin (LIPITOR) 40 MG tablet Take 1 tablet (40 mg total) by mouth once daily. 90 tablet 1    baclofen (LIORESAL) 10 MG tablet Take 1 tablet (10 mg total) by mouth 3 (three) times daily as needed (muscle spasms). 21 tablet 0    diltiaZEM (CARDIZEM CD) 360 MG 24 hr capsule Take 1 capsule (360 mg total) by mouth once daily. 90 capsule 1    hyoscyamine (ANASPAZ,LEVSIN) 0.125 mg Tab Take 125 mcg by mouth every 6 (six) hours as needed.      LIDOcaine (LIDODERM) 5 %  Place 1 patch onto the skin once daily. Remove & Discard patch within 12 hours or as directed by MD 15 patch 0    losartan (COZAAR) 50 MG tablet Take 1 tablet (50 mg total) by mouth once daily. 90 tablet 1    methocarbamoL (ROBAXIN) 750 MG Tab Take 750 mg by mouth 3 (three) times daily.      NIFEdipine (PROCARDIA-XL) 30 MG (OSM) 24 hr tablet Take 30 mg by mouth Daily.      ondansetron (ZOFRAN-ODT) 4 MG TbDL Take 4 mg by mouth every 12 (twelve) hours as needed.      potassium chloride (K-TAB) 20 mEq Take 1 tablet (20 mEq total) by mouth 2 (two) times a day. 90 tablet 3    spironolactone (ALDACTONE) 50 MG tablet Take 1 tablet (50 mg total) by mouth once daily. 90 tablet 1    traMADoL (ULTRAM) 50 mg tablet Take 1 tablet (50 mg total) by mouth every 6 (six) hours as needed for Pain. 12 tablet 0    vitamin D (VITAMIN D3) 1000 units Tab Take 1 tablet (1,000 Units total) by mouth once daily. 30 tablet 1    XARELTO 20 mg Tab TAKE 1 TABLET BY MOUTH DAILY with SUPPER 90 tablet 3    albuterol (PROVENTIL/VENTOLIN HFA) 90 mcg/actuation inhaler Inhale 2 puffs into the lungs every 6 (six) hours as needed for Wheezing. Rescue 8.5 g 0    cetirizine (ZYRTEC) 10 MG tablet Take 1 tablet (10 mg total) by mouth once daily. for 14 days 14 tablet 0    famotidine (PEPCID) 20 MG tablet Take 1 tablet (20 mg total) by mouth 2 (two) times daily. for 10 days 20 tablet 0    fluticasone propionate (FLONASE) 50 mcg/actuation nasal spray 2 sprays (100 mcg total) by Each Nostril route once daily. (Patient not taking: Reported on 5/2/2023) 15 g 0    GI cocktail antac/dicyc/lidoc Swish and swallow 30 mLs every 6 (six) hours as needed (abdominal pain). (Patient not taking: Reported on 5/29/2023) 450 mL 1    metoprolol succinate (TOPROL-XL) 200 MG 24 hr tablet Take 1 tablet (200 mg total) by mouth 2 (two) times daily. 180 tablet 1    omeprazole (PRILOSEC) 20 MG capsule Take 1 capsule (20 mg total) by mouth once daily. (Patient not taking: Reported on  "8/8/2023) 30 capsule 2    promethazine (PHENERGAN) 25 MG tablet Take 1 tablet (25 mg total) by mouth every 6 (six) hours as needed for Nausea. (Patient not taking: Reported on 5/29/2023) 15 tablet 0     No current facility-administered medications on file prior to visit.       Review of Systems:   All systems reviewed and found to be negative except for the symptoms detailed above    Physical Examination:   VITAL SIGNS:   Vitals:    08/09/23 1248   BP: (!) 170/110   Pulse: 78   Resp: 20   Temp: 97.7 °F (36.5 °C)     GENERAL:  In no apparent distress.    HEAD:  No signs of head trauma.  EYES:  Pupils are equal.  Extraocular motions intact.    EARS:  Hearing grossly intact.  MOUTH:  Oropharynx is normal.   NECK:  No adenopathy, no JVD.     CHEST:  Chest with clear breath sounds bilaterally.  No wheezes, rales, rhonchi.    CARDIAC:  Regular rate and rhythm.  S1 and S2, without murmurs, gallops, rubs.  VASCULAR:  No Edema.  Peripheral pulses normal and equal in all extremities.  ABDOMEN:  Soft, without detectable tenderness.  No sign of distention.  No   rebound or guarding, and no masses palpated.   Bowel Sounds normal.  MUSCULOSKELETAL:  Good range of motion of all major joints. Extremities without clubbing, cyanosis or edema.    NEUROLOGIC EXAM:  Alert and oriented x 3.  No focal sensory or strength deficits.   Speech normal.  Follows commands.  PSYCHIATRIC:  Mood normal.    No results for input(s): "CBC" in the last 72 hours.   No results for input(s): "CMP" in the last 72 hours.     Assessment:  Problem List Items Addressed This Visit          Cardiac/Vascular    Longstanding persistent atrial fibrillation    Cardiac arrest with ventricular fibrillation    Cardiac pacemaker in situ    Hypertensive urgency       Hematology    History of deep vein thrombosis (DVT) of lower extremity    Current use of long term anticoagulation       Oncology    Neuroendocrine carcinoma of small bowel - Primary     Well-differentiated " neuroendocrine tumor of small bowel:  -presented with small-bowel obstruction   -S/P resection of 127 cm of small bowel and mesenteric mass at the base of mesentery, 11/02/2018  -pT3,4 pN2 MX, at least stage III, well-differentiated neuroendocrine tumor of small bowel, multifocal, grade 1; large mesenteric mass (3.8 cm); 2:21 lymph nodes involved; small multifocal areas of tumor in the efren-intestinal adipose tissue  -Colonoscopy (12/27/2018): 3 mm hyperplastic sigmoid polyp  -No somatostatin receptor avid disease on whole-body gallium dotatate PET/CT (02/15/2019)  -No evidence of neuroendocrine tumor on octreotide scan (06/28/2019)  -No evidence of disease (surveillance CTs C/A/P 11/01/2019)  -no recurrence or metastasis on surveillance CTs 07/07/2022  -no recurrence on CT A/P with contrast 09/07/2022        History of coronary artery disease, atrial fibrillation  History of cardiac arrest with ventricular fibrillation, MI in 2003  History of previous DVTs  S/P CPR 03/08/2018  On anticoagulation for history of DVTs and atrial fibrillation  History of second-degree AV block requiring PPM.        Plan:  This is time for multiphasic CT scans of A/P with contrast, and contrast-enhanced CT scan of chest, CBC, CMP   Follow-up in 2 weeks, with scans and labs.  Blood pressure 166/113 right arm; 161/170 left arm; manual blood pressure 170/110; in no acute discomfort; no symptoms; will refer him to emergency department for management of hypertensive urgency.  He is completely asymptomatic.  Did not take his antihypertensive in the morning.  Potassium 3.1, low.     Check magnesium level.     Start potassium chloride 40 mEq p.o. q.day x2 weeks; no refills  Recheck CMP and magnesium level in 2 weeks.  -----------------------      -S/P resection of small bowel and mesenteric mass 11/02/2018  -continue surveillance  -From 1 year post resection up to 10 years (11/2019-11/2028), every 12-24 months:  History and physical, consider  biochemical markers, consider abdominal +/-pelvic multiphasic CT or MRI with contrast, consider CT chest without contrast  -JESSIE as of 07/07/2022, 09/07/2022  >>>  This is time for multiphasic CT scans of A/P with contrast, and contrast-enhanced CT scan of chest, CBC, CMP   Blood pressure 166/113 right arm; 161/170 left arm; manual blood pressure 170/110; in no acute discomfort; no symptoms; will refer him to emergency department for the needful.    Follow-up in 2 weeks, with scans and labs.     Above discussed with him.  All questions answered.  He understands and agrees with this plan.  ----------------------     Surveillance:   Underwent resection on 11/02/2018   1.  3-12 months post resection (until 11/2019):   -History and physical   -Consider biochemical markers as clinically indicated   -Abdominal with or without pelvic multiphasic CT or MRI with IV contrast, as clinically indicated   -Chest CT with and without contrast for primary lung/thymus tumors (as clinically indicated for primary GI tumors)      2.  >1-year post resection to 10 years:   Every 12-24 months:   -History and physical   -Consider biochemical markers as clinically indicated   -Consider abdominal with or without pelvic multiphasic CT or MRI with contrast   -Consider chest CT with and without contrast for primary lung/thymus tumors (as clinically indicated for primary GI tumors)      3. >10 years:   Consider surveillance as clinically indicated       Follow-up:  No follow-ups on file.

## 2023-08-09 NOTE — Clinical Note
Potassium 3.1, low.     Check magnesium level.     Start potassium chloride 40 mEq p.o. q.day x2 weeks; no refills Recheck CMP and magnesium level in 2 weeks.

## 2023-08-09 NOTE — Clinical Note
This is time for multiphasic CT scans of A/P with contrast, and contrast-enhanced CT scan of chest, CBC, CMP  Follow-up in 2 weeks, with scans and labs. Blood pressure 166/113 right arm; 161/170 left arm; manual blood pressure 170/110; in no acute discomfort; no symptoms; will refer him to emergency department for the needful.

## 2023-08-09 NOTE — ED PROVIDER NOTES
Encounter Date: 8/9/2023       History     Chief Complaint   Patient presents with    Hypertension     Sent from clinic due to hypertension. Bp 170/117. States didn't take bp med this am. Denies any symptoms     Delio Daniel Jr. Is a 68 y.o. male with a history of HTN, HLD, afib, CAD who presents to the ED for elevated blood pressure. Pt was at an appointment this morning where his BP was noted to be in the 170s/110s and he was told to come to the ED for treatment. Patient states being in a rush to get to his appointment this morning and did not have time to take his blood pressure medications. He takes metoprolol, losartan, and aldactone daily. He denies headache, vision changes, chest pain, SOB, N/V.    The history is provided by the patient. No  was used.     Review of patient's allergies indicates:  No Known Allergies  Past Medical History:   Diagnosis Date    Arthritis     Atrial fibrillation     BPH (benign prostatic hyperplasia)     Cardiac arrest     Coronary artery disease     Cyst, kidney, acquired     Diverticulosis     Hyperlipidemia     Hypertension     MI (myocardial infarction)     Obesity     Steatosis of liver      Past Surgical History:   Procedure Laterality Date    A-V CARDIAC PACEMAKER INSERTION Right     CARDIAC CATHETERIZATION      COLONOSCOPY W/ BIOPSIES      excision of colon       Family History   Problem Relation Age of Onset    Hypertension Mother     Hypertension Father     Hypertension Sister      Social History     Tobacco Use    Smoking status: Former     Current packs/day: 0.00    Smokeless tobacco: Never   Substance Use Topics    Alcohol use: Not Currently    Drug use: Not Currently     Review of Systems   Constitutional:  Negative for activity change, chills and fever.   HENT:  Negative for congestion and trouble swallowing.    Eyes:  Negative for photophobia and visual disturbance.   Respiratory:  Negative for chest tightness, shortness of breath and wheezing.     Cardiovascular:  Negative for chest pain, palpitations and leg swelling.   Gastrointestinal:  Negative for abdominal pain, constipation, diarrhea, nausea and vomiting.   Genitourinary:  Negative for dysuria, frequency, hematuria and urgency.   Musculoskeletal:  Negative for arthralgias, back pain and gait problem.   Skin:  Negative for color change and rash.   Neurological:  Negative for dizziness, syncope, weakness, light-headedness, numbness and headaches.   Psychiatric/Behavioral:  Negative for agitation and confusion. The patient is not nervous/anxious.        Physical Exam     Initial Vitals [08/09/23 1334]   BP Pulse Resp Temp SpO2   (!) 170/117 73 20 98.1 °F (36.7 °C) 99 %      MAP       --         Physical Exam    Nursing note and vitals reviewed.  Constitutional: He appears well-developed and well-nourished. No distress.   HENT:   Head: Normocephalic and atraumatic.   Mouth/Throat: No oropharyngeal exudate.   Eyes: EOM are normal. No scleral icterus.   Neck: Neck supple.   Normal range of motion.  Cardiovascular:  Normal rate and regular rhythm.           No murmur heard.  Pulmonary/Chest: No respiratory distress. He has no wheezes.   Abdominal: Abdomen is soft. He exhibits no distension. There is no abdominal tenderness.   Musculoskeletal:         General: No edema. Normal range of motion.      Cervical back: Normal range of motion and neck supple.     Neurological: He is alert and oriented to person, place, and time. No cranial nerve deficit.   Skin: Skin is warm and dry. Capillary refill takes less than 2 seconds. No erythema.   Psychiatric: He has a normal mood and affect. Thought content normal.         ED Course   Procedures  Labs Reviewed - No data to display       Imaging Results    None          Medications   losartan tablet 50 mg (50 mg Oral Given 8/9/23 1415)   spironolactone tablet 50 mg (50 mg Oral Given 8/9/23 1415)   metoprolol succinate (TOPROL-XL) 24 hr tablet 200 mg (200 mg Oral Given  8/9/23 1415)     Medical Decision Making:   Initial Assessment:   Resting comfortably in NAD. HDS and afebrile.   Differential Diagnosis:   Hypertension, uncontrolled hypertension, hypertensive urgency  ED Management:  Pt BP usually controlled on home meds per chart review. States he did not have time to take his meds prior to going to his appointment this am. Follows with cardiology regularly, BP has been stable on current regimen for a while. Pt given his home meds and stable for discharge. ED return precautions given. He verbalized understanding. All questions answered.                           Clinical Impression:   Final diagnoses:  [I10] Uncontrolled hypertension (Primary)        ED Disposition Condition    Discharge Stable          ED Prescriptions    None       Follow-up Information       Follow up With Specialties Details Why Contact Info    [facility] Dept Emergency Medicine  If symptoms worsen [address] [facility] [id number] [phone]    Magdi Rivera, DO Internal Medicine   [address] [facility] [address]  [phone]               Ning Stoner, SAMUEL  08/09/23 0716

## 2023-08-10 ENCOUNTER — TELEPHONE (OUTPATIENT)
Dept: CARDIOLOGY | Facility: CLINIC | Age: 68
End: 2023-08-10
Payer: MEDICARE

## 2023-08-10 NOTE — TELEPHONE ENCOUNTER
Referral sent to [facility] EP clinic for AV node ablation consideration. Sent via [employer] on 8-9-23.   [employer] tracking number: [id number]

## 2023-08-11 ENCOUNTER — HOSPITAL ENCOUNTER (EMERGENCY)
Facility: HOSPITAL | Age: 68
Discharge: HOME | End: 2023-08-11
Attending: STUDENT IN AN ORGANIZED HEALTH CARE EDUCATION/TRAINING PROGRAM
Payer: MEDICARE

## 2023-08-11 VITALS
HEART RATE: 94 BPM | RESPIRATION RATE: 22 BRPM | TEMPERATURE: 98 F | OXYGEN SATURATION: 99 % | BODY MASS INDEX: 35.1 KG/M2 | SYSTOLIC BLOOD PRESSURE: 159 MMHG | HEIGHT: 69 IN | DIASTOLIC BLOOD PRESSURE: 119 MMHG | WEIGHT: 237 LBS

## 2023-08-11 DIAGNOSIS — R60.0 BILATERAL LOWER EXTREMITY EDEMA: Primary | ICD-10-CM

## 2023-08-11 DIAGNOSIS — I50.9 CHF (CONGESTIVE HEART FAILURE): ICD-10-CM

## 2023-08-11 DIAGNOSIS — E87.6 HYPOKALEMIA: ICD-10-CM

## 2023-08-11 DIAGNOSIS — R60.9 EDEMA: ICD-10-CM

## 2023-08-11 LAB
ALBUMIN SERPL-MCNC: 3.7 G/DL (ref 3.4–4.8)
ALBUMIN/GLOB SERPL: 1.1 RATIO (ref 1.1–2)
ALP SERPL-CCNC: 59 UNIT/L (ref 40–150)
ALT SERPL-CCNC: 23 UNIT/L (ref 0–55)
APPEARANCE UR: CLEAR
AST SERPL-CCNC: 21 UNIT/L (ref 5–34)
BACTERIA #/AREA URNS AUTO: ABNORMAL /HPF
BASOPHILS # BLD AUTO: 0.05 X10(3)/MCL
BASOPHILS NFR BLD AUTO: 0.8 %
BILIRUB SERPL-MCNC: 1.3 MG/DL
BILIRUB UR QL STRIP.AUTO: NEGATIVE
BNP BLD-MCNC: 439.5 PG/ML
BUN SERPL-MCNC: 11.5 MG/DL (ref 8.4–25.7)
CALCIUM SERPL-MCNC: 8.9 MG/DL (ref 8.8–10)
CHLORIDE SERPL-SCNC: 109 MMOL/L (ref 98–107)
CO2 SERPL-SCNC: 23 MMOL/L (ref 23–31)
COLOR UR: ABNORMAL
CREAT SERPL-MCNC: 1.21 MG/DL (ref 0.73–1.18)
EOSINOPHIL # BLD AUTO: 0.11 X10(3)/MCL (ref 0–0.9)
EOSINOPHIL NFR BLD AUTO: 1.8 %
ERYTHROCYTE [DISTWIDTH] IN BLOOD BY AUTOMATED COUNT: 14.4 % (ref 11.5–17)
GFR SERPLBLD CREATININE-BSD FMLA CKD-EPI: >60 MLS/MIN/1.73/M2
GLOBULIN SER-MCNC: 3.3 GM/DL (ref 2.4–3.5)
GLUCOSE SERPL-MCNC: 98 MG/DL (ref 82–115)
GLUCOSE UR QL STRIP.AUTO: NORMAL
HCT VFR BLD AUTO: 40.4 % (ref 42–52)
HGB BLD-MCNC: 13.6 G/DL (ref 14–18)
HYALINE CASTS #/AREA URNS LPF: ABNORMAL /LPF
IMM GRANULOCYTES # BLD AUTO: 0.03 X10(3)/MCL (ref 0–0.04)
IMM GRANULOCYTES NFR BLD AUTO: 0.5 %
KETONES UR QL STRIP.AUTO: NEGATIVE
LEUKOCYTE ESTERASE UR QL STRIP.AUTO: NEGATIVE
LYMPHOCYTES # BLD AUTO: 1.92 X10(3)/MCL (ref 0.6–4.6)
LYMPHOCYTES NFR BLD AUTO: 31.2 %
MAGNESIUM SERPL-MCNC: 1.8 MG/DL (ref 1.6–2.6)
MCH RBC QN AUTO: 29.7 PG (ref 27–31)
MCHC RBC AUTO-ENTMCNC: 33.7 G/DL (ref 33–36)
MCV RBC AUTO: 88.2 FL (ref 80–94)
MONOCYTES # BLD AUTO: 0.65 X10(3)/MCL (ref 0.1–1.3)
MONOCYTES NFR BLD AUTO: 10.6 %
MUCOUS THREADS URNS QL MICRO: ABNORMAL /LPF
NEUTROPHILS # BLD AUTO: 3.4 X10(3)/MCL (ref 2.1–9.2)
NEUTROPHILS NFR BLD AUTO: 55.1 %
NITRITE UR QL STRIP.AUTO: NEGATIVE
NRBC BLD AUTO-RTO: 0 %
PH UR STRIP.AUTO: 6 [PH]
PLATELET # BLD AUTO: 211 X10(3)/MCL (ref 130–400)
PMV BLD AUTO: 10 FL (ref 7.4–10.4)
POTASSIUM SERPL-SCNC: 3 MMOL/L (ref 3.5–5.1)
PROT SERPL-MCNC: 7 GM/DL (ref 5.8–7.6)
PROT UR QL STRIP.AUTO: ABNORMAL
RBC # BLD AUTO: 4.58 X10(6)/MCL (ref 4.7–6.1)
RBC #/AREA URNS AUTO: ABNORMAL /HPF
RBC UR QL AUTO: ABNORMAL
SODIUM SERPL-SCNC: 143 MMOL/L (ref 136–145)
SP GR UR STRIP.AUTO: 1.02
SQUAMOUS #/AREA URNS LPF: ABNORMAL /HPF
TROPONIN I SERPL-MCNC: 0.02 NG/ML (ref 0–0.04)
UROBILINOGEN UR STRIP-ACNC: NORMAL
WBC # SPEC AUTO: 6.16 X10(3)/MCL (ref 4.5–11.5)
WBC #/AREA URNS AUTO: ABNORMAL /HPF

## 2023-08-11 PROCEDURE — 85025 COMPLETE CBC W/AUTO DIFF WBC: CPT | Performed by: STUDENT IN AN ORGANIZED HEALTH CARE EDUCATION/TRAINING PROGRAM

## 2023-08-11 PROCEDURE — 99285 EMERGENCY DEPT VISIT HI MDM: CPT | Mod: 25

## 2023-08-11 PROCEDURE — 83880 ASSAY OF NATRIURETIC PEPTIDE: CPT | Performed by: STUDENT IN AN ORGANIZED HEALTH CARE EDUCATION/TRAINING PROGRAM

## 2023-08-11 PROCEDURE — 81001 URINALYSIS AUTO W/SCOPE: CPT | Performed by: STUDENT IN AN ORGANIZED HEALTH CARE EDUCATION/TRAINING PROGRAM

## 2023-08-11 PROCEDURE — 80053 COMPREHEN METABOLIC PANEL: CPT | Performed by: STUDENT IN AN ORGANIZED HEALTH CARE EDUCATION/TRAINING PROGRAM

## 2023-08-11 PROCEDURE — 93005 ELECTROCARDIOGRAM TRACING: CPT

## 2023-08-11 PROCEDURE — 25000003 PHARM REV CODE 250: Performed by: STUDENT IN AN ORGANIZED HEALTH CARE EDUCATION/TRAINING PROGRAM

## 2023-08-11 PROCEDURE — 84484 ASSAY OF TROPONIN QUANT: CPT | Performed by: STUDENT IN AN ORGANIZED HEALTH CARE EDUCATION/TRAINING PROGRAM

## 2023-08-11 PROCEDURE — 96374 THER/PROPH/DIAG INJ IV PUSH: CPT

## 2023-08-11 PROCEDURE — 63600175 PHARM REV CODE 636 W HCPCS: Performed by: STUDENT IN AN ORGANIZED HEALTH CARE EDUCATION/TRAINING PROGRAM

## 2023-08-11 PROCEDURE — 83735 ASSAY OF MAGNESIUM: CPT | Performed by: STUDENT IN AN ORGANIZED HEALTH CARE EDUCATION/TRAINING PROGRAM

## 2023-08-11 RX ORDER — POTASSIUM CHLORIDE 20 MEQ/1
40 TABLET, EXTENDED RELEASE ORAL ONCE
Status: COMPLETED | OUTPATIENT
Start: 2023-08-11 | End: 2023-08-11

## 2023-08-11 RX ORDER — FUROSEMIDE 10 MG/ML
20 INJECTION INTRAMUSCULAR; INTRAVENOUS
Status: COMPLETED | OUTPATIENT
Start: 2023-08-11 | End: 2023-08-11

## 2023-08-11 RX ADMIN — POTASSIUM CHLORIDE 40 MEQ: 1500 TABLET, EXTENDED RELEASE ORAL at 02:08

## 2023-08-11 RX ADMIN — FUROSEMIDE 20 MG: 10 INJECTION, SOLUTION INTRAMUSCULAR; INTRAVENOUS at 02:08

## 2023-08-11 NOTE — DISCHARGE INSTRUCTIONS
Your ER visit was concerned that your heart may not be pumping as well as it should.  You were given a dose fluid medication here call Lasix, you need to call your primary care physician in the morning for close follow up further evaluation, return with any new or worsening symptoms.

## 2023-08-11 NOTE — ED PROVIDER NOTES
"Encounter Date: 8/11/2023       History     Chief Complaint   Patient presents with    Leg Swelling     Pt reports to the ed c/o "swelling" to lower legs since this past Tuesday. Denies chest pain and sob at this time. Dw=322/112     Patient presents to the emergency department complaining of lower extremity swelling.  He states he has been going on for about 4 days now.  Mostly around his ankles in his feet.  He is denying any chest pain or shortness of breath.  Denies a history of congestive heart failure though he does have a history of coronary disease and Afib. he denies being fluid pill.    The history is provided by the patient.     Review of patient's allergies indicates:  No Known Allergies  Past Medical History:   Diagnosis Date    Arthritis     Atrial fibrillation     BPH (benign prostatic hyperplasia)     Cardiac arrest     Coronary artery disease     Cyst, kidney, acquired     Diverticulosis     Hyperlipidemia     Hypertension     MI (myocardial infarction)     Obesity     Steatosis of liver      Past Surgical History:   Procedure Laterality Date    A-V CARDIAC PACEMAKER INSERTION Right     CARDIAC CATHETERIZATION      COLONOSCOPY W/ BIOPSIES      excision of colon       Family History   Problem Relation Age of Onset    Hypertension Mother     Hypertension Father     Hypertension Sister      Social History     Tobacco Use    Smoking status: Former     Current packs/day: 0.00    Smokeless tobacco: Never   Substance Use Topics    Alcohol use: Not Currently    Drug use: Not Currently     Review of Systems   Constitutional:  Negative for chills and fever.   HENT:  Negative for congestion and sore throat.    Respiratory:  Negative for cough and shortness of breath.    Cardiovascular:  Positive for leg swelling. Negative for chest pain and palpitations.   Gastrointestinal:  Negative for abdominal pain and nausea.   Genitourinary:  Negative for dysuria and hematuria.   Musculoskeletal:  Negative for " arthralgias, back pain and myalgias.   Skin:  Negative for rash.   Neurological:  Negative for dizziness and weakness.   Hematological:  Does not bruise/bleed easily.       Physical Exam     Initial Vitals [08/11/23 0136]   BP Pulse Resp Temp SpO2   (!) 186/112 94 (!) 22 97.7 °F (36.5 °C) 99 %      MAP       --         Physical Exam    Nursing note and vitals reviewed.  Constitutional: He appears well-developed and well-nourished.   HENT:   Head: Normocephalic and atraumatic.   Eyes: Conjunctivae are normal. Pupils are equal, round, and reactive to light.   Neck: Neck supple.   Normal range of motion.  Cardiovascular:  Normal rate and normal heart sounds.           Irregularly irregular rhythm   Pulmonary/Chest: Breath sounds normal. No respiratory distress. He has no rales.   Abdominal: Abdomen is soft. There is no abdominal tenderness.   Musculoskeletal:         General: Edema (bilateral lower extremities) present. Normal range of motion.      Cervical back: Normal range of motion and neck supple.     Neurological: He is alert and oriented to person, place, and time.   Skin: Skin is warm and dry.         ED Course   Procedures  Labs Reviewed   COMPREHENSIVE METABOLIC PANEL - Abnormal; Notable for the following components:       Result Value    Potassium Level 3.0 (*)     Chloride 109 (*)     Creatinine 1.21 (*)     All other components within normal limits   B-TYPE NATRIURETIC PEPTIDE - Abnormal; Notable for the following components:    Natriuretic Peptide 439.5 (*)     All other components within normal limits   URINALYSIS, REFLEX TO URINE CULTURE - Abnormal; Notable for the following components:    Protein, UA 1+ (*)     Blood, UA 1+ (*)     Squamous Epithelial Cells, UA Trace (*)     Mucous, UA Small (*)     All other components within normal limits   CBC WITH DIFFERENTIAL - Abnormal; Notable for the following components:    RBC 4.58 (*)     Hgb 13.6 (*)     Hct 40.4 (*)     All other components within normal  limits   MAGNESIUM - Normal   TROPONIN I - Normal   CBC W/ AUTO DIFFERENTIAL    Narrative:     The following orders were created for panel order CBC auto differential.  Procedure                               Abnormality         Status                     ---------                               -----------         ------                     CBC with Differential[[id number]]        Abnormal            Final result                 Please view results for these tests on the individual orders.     EKG Readings: (Independently Interpreted)   Initial Reading: No STEMI. Rhythm: Atrial Fibrillation. Heart Rate: 80. Conduction: Normal. Axis: Normal.       Imaging Results              X-Ray Chest PA And Lateral (Preliminary result)  Result time 08/11/23 02:48:11      Wet Read by Christiano Hale MD (08/11/23 02:48:11, [facility] Dept, Emergency Medicine)    Mild pulmonary vascular congestion                                     Medications   furosemide injection 20 mg (20 mg Intravenous Given 8/11/23 0245)   potassium chloride SA CR tablet 40 mEq (40 mEq Oral Given 8/11/23 0244)     Medical Decision Making:   Initial Assessment:   Mildly hypertensive male complaining of lower extremity swelling, no chest pain or shortness of breath, we will initiate cardiac workup, likely CHF  Differential Diagnosis:   Kidney failure, acute CHF, dependent edema, among others  Independently Interpreted Test(s):   I have ordered and independently interpreted X-rays - see prior notes.  I have ordered and independently interpreted EKG Reading(s) - see prior notes  Clinical Tests:   Lab Tests: Ordered and Reviewed  The following lab test(s) were unremarkable: CBC       <> Summary of Lab: Mild hypokalemia to 3, creatinine around 1.2, BNP is elevated over 500  Radiological Study: Ordered and Reviewed  Medical Tests: Ordered and Reviewed  ED Management:  Patient is hypertensive otherwise vital signs are stable.  EKGs without acute  ischemic changes.  Troponin within normal limits.  He was mildly hypokalemic at 3, replacements were ordered.  Chest x-ray on my review some possible mild pulmonary vascular congestion, along with patient's elevated BNP and lower extremity swelling likely volume overloaded from CHF.  We will give a dose of IV Lasix here, patient isn't on spironolactone and Lasix at home.  Reviewing his last echo is EF is around 50%.  Overall he was satting 100% on room air besides and lower extremity swelling in his asymptomatic.  He is appropriate for discharge and close outpatient follow up, instructed to call his primary care physician 1st thing in the morning for further evaluation.  He was given strict return precautions                          Clinical Impression:   Final diagnoses:  [R60.9] Edema  [I50.9] CHF (congestive heart failure)  [R60.0] Bilateral lower extremity edema (Primary)  [E87.6] Hypokalemia        ED Disposition Condition    Discharge Stable          ED Prescriptions    None       Follow-up Information       Follow up With Specialties Details Why Contact Info    Magdi Rivera, DO Internal Medicine Call today For ED follow up [address] [facility] [id number] [phone]               Christiano Hale MD  08/11/23 2255

## 2023-08-14 ENCOUNTER — HOSPITAL ENCOUNTER (OUTPATIENT)
Facility: HOSPITAL | Age: 68
Discharge: HOME | End: 2023-08-16
Attending: EMERGENCY MEDICINE | Admitting: STUDENT IN AN ORGANIZED HEALTH CARE EDUCATION/TRAINING PROGRAM
Payer: MEDICARE

## 2023-08-14 DIAGNOSIS — I10 HYPERTENSION, POOR CONTROL: ICD-10-CM

## 2023-08-14 DIAGNOSIS — I10 HYPERTENSION, UNSPECIFIED TYPE: ICD-10-CM

## 2023-08-14 DIAGNOSIS — R60.0 BILATERAL LOWER EXTREMITY EDEMA: ICD-10-CM

## 2023-08-14 DIAGNOSIS — L60.3 DYSTROPHIC NAIL: ICD-10-CM

## 2023-08-14 DIAGNOSIS — E87.6 HYPOKALEMIA: ICD-10-CM

## 2023-08-14 DIAGNOSIS — I48.91 ATRIAL FIBRILLATION: ICD-10-CM

## 2023-08-14 DIAGNOSIS — R54 AGE-RELATED PHYSICAL DEBILITY: ICD-10-CM

## 2023-08-14 DIAGNOSIS — I50.9 CONGESTIVE HEART FAILURE, UNSPECIFIED HF CHRONICITY, UNSPECIFIED HEART FAILURE TYPE: Primary | ICD-10-CM

## 2023-08-14 LAB
ALBUMIN SERPL-MCNC: 3.8 G/DL (ref 3.4–4.8)
ALBUMIN/GLOB SERPL: 1.2 RATIO (ref 1.1–2)
ALP SERPL-CCNC: 59 UNIT/L (ref 40–150)
ALT SERPL-CCNC: 17 UNIT/L (ref 0–55)
AMPHET UR QL SCN: NEGATIVE
APPEARANCE UR: CLEAR
APTT PPP: 29.1 SECONDS
AST SERPL-CCNC: 21 UNIT/L (ref 5–34)
BACTERIA #/AREA URNS AUTO: ABNORMAL /HPF
BARBITURATE SCN PRESENT UR: NEGATIVE
BASOPHILS # BLD AUTO: 0.04 X10(3)/MCL
BASOPHILS NFR BLD AUTO: 0.7 %
BENZODIAZ UR QL SCN: NEGATIVE
BILIRUB SERPL-MCNC: 1.2 MG/DL
BILIRUB UR QL STRIP.AUTO: NEGATIVE
BNP BLD-MCNC: 329.2 PG/ML
BUN SERPL-MCNC: 10.5 MG/DL (ref 8.4–25.7)
CALCIUM SERPL-MCNC: 9.1 MG/DL (ref 8.8–10)
CANNABINOIDS UR QL SCN: NEGATIVE
CHLORIDE SERPL-SCNC: 106 MMOL/L (ref 98–107)
CO2 SERPL-SCNC: 25 MMOL/L (ref 23–31)
COCAINE UR QL SCN: NEGATIVE
COLOR UR: COLORLESS
CREAT SERPL-MCNC: 1.09 MG/DL (ref 0.73–1.18)
EOSINOPHIL # BLD AUTO: 0.1 X10(3)/MCL (ref 0–0.9)
EOSINOPHIL NFR BLD AUTO: 1.8 %
ERYTHROCYTE [DISTWIDTH] IN BLOOD BY AUTOMATED COUNT: 14.7 % (ref 11.5–17)
EST. AVERAGE GLUCOSE BLD GHB EST-MCNC: 108.3 MG/DL
ETHANOL SERPL-MCNC: <10 MG/DL
FENTANYL UR QL SCN: NEGATIVE
FERRITIN SERPL-MCNC: 81.39 NG/ML (ref 21.81–274.66)
GFR SERPLBLD CREATININE-BSD FMLA CKD-EPI: >60 MLS/MIN/1.73/M2
GLOBULIN SER-MCNC: 3.2 GM/DL (ref 2.4–3.5)
GLUCOSE SERPL-MCNC: 96 MG/DL (ref 82–115)
GLUCOSE UR QL STRIP.AUTO: NORMAL
HAV IGM SERPL QL IA: NONREACTIVE
HBA1C MFR BLD: 5.4 %
HBV CORE IGM SERPL QL IA: NONREACTIVE
HBV SURFACE AG SERPL QL IA: NONREACTIVE
HCT VFR BLD AUTO: 41.2 % (ref 42–52)
HCV AB SERPL QL IA: NONREACTIVE
HGB BLD-MCNC: 13.9 G/DL (ref 14–18)
HIV 1+2 AB+HIV1 P24 AG SERPL QL IA: NONREACTIVE
HYALINE CASTS #/AREA URNS LPF: ABNORMAL /LPF
IMM GRANULOCYTES # BLD AUTO: 0.04 X10(3)/MCL (ref 0–0.04)
IMM GRANULOCYTES NFR BLD AUTO: 0.7 %
INR PPP: 1
IRON SATN MFR SERPL: 19 % (ref 20–50)
IRON SERPL-MCNC: 66 UG/DL (ref 65–175)
KETONES UR QL STRIP.AUTO: NEGATIVE
LACTATE SERPL-SCNC: 1.9 MMOL/L (ref 0.5–2.2)
LACTATE SERPL-SCNC: 3 MMOL/L (ref 0.5–2.2)
LEUKOCYTE ESTERASE UR QL STRIP.AUTO: NEGATIVE
LYMPHOCYTES # BLD AUTO: 1.55 X10(3)/MCL (ref 0.6–4.6)
LYMPHOCYTES NFR BLD AUTO: 28.7 %
MAGNESIUM SERPL-MCNC: 1.9 MG/DL (ref 1.6–2.6)
MCH RBC QN AUTO: 30.2 PG (ref 27–31)
MCHC RBC AUTO-ENTMCNC: 33.7 G/DL (ref 33–36)
MCV RBC AUTO: 89.4 FL (ref 80–94)
MDMA UR QL SCN: NEGATIVE
MONOCYTES # BLD AUTO: 0.62 X10(3)/MCL (ref 0.1–1.3)
MONOCYTES NFR BLD AUTO: 11.5 %
MUCOUS THREADS URNS QL MICRO: ABNORMAL /LPF
NEUTROPHILS # BLD AUTO: 3.06 X10(3)/MCL (ref 2.1–9.2)
NEUTROPHILS NFR BLD AUTO: 56.6 %
NITRITE UR QL STRIP.AUTO: NEGATIVE
NRBC BLD AUTO-RTO: 0 %
OPIATES UR QL SCN: NEGATIVE
PCP UR QL: NEGATIVE
PH UR STRIP.AUTO: 7 [PH]
PH UR: 7 [PH] (ref 3–11)
PHOSPHATE SERPL-MCNC: 3.3 MG/DL (ref 2.3–4.7)
PLATELET # BLD AUTO: 223 X10(3)/MCL (ref 130–400)
PMV BLD AUTO: 10.3 FL (ref 7.4–10.4)
POTASSIUM SERPL-SCNC: 2.9 MMOL/L (ref 3.5–5.1)
POTASSIUM SERPL-SCNC: 2.9 MMOL/L (ref 3.5–5.1)
PROT SERPL-MCNC: 7 GM/DL (ref 5.8–7.6)
PROT UR QL STRIP.AUTO: NEGATIVE
PROTHROMBIN TIME: 13.3 SECONDS (ref 11.4–14)
RBC # BLD AUTO: 4.61 X10(6)/MCL (ref 4.7–6.1)
RBC #/AREA URNS AUTO: ABNORMAL /HPF
RBC UR QL AUTO: NEGATIVE
SODIUM SERPL-SCNC: 140 MMOL/L (ref 136–145)
SP GR UR STRIP.AUTO: 1.01
SPERM URNS QL MICRO: ABNORMAL /HPF
SQUAMOUS #/AREA URNS LPF: ABNORMAL /HPF
T PALLIDUM AB SER QL: NONREACTIVE
TIBC SERPL-MCNC: 274 UG/DL (ref 69–240)
TIBC SERPL-MCNC: 340 UG/DL (ref 250–450)
TRANSFERRIN SERPL-MCNC: 290 MG/DL (ref 163–344)
TROPONIN I SERPL-MCNC: 0.02 NG/ML (ref 0–0.04)
TSH SERPL-ACNC: 2.9 UIU/ML (ref 0.35–4.94)
UROBILINOGEN UR STRIP-ACNC: NORMAL
WBC # SPEC AUTO: 5.41 X10(3)/MCL (ref 4.5–11.5)
WBC #/AREA URNS AUTO: ABNORMAL /HPF

## 2023-08-14 PROCEDURE — 80074 ACUTE HEPATITIS PANEL: CPT

## 2023-08-14 PROCEDURE — 85025 COMPLETE CBC W/AUTO DIFF WBC: CPT | Performed by: PHYSICIAN ASSISTANT

## 2023-08-14 PROCEDURE — 21400001 HC TELEMETRY ROOM

## 2023-08-14 PROCEDURE — 82077 ASSAY SPEC XCP UR&BREATH IA: CPT

## 2023-08-14 PROCEDURE — 96366 THER/PROPH/DIAG IV INF ADDON: CPT

## 2023-08-14 PROCEDURE — 83735 ASSAY OF MAGNESIUM: CPT

## 2023-08-14 PROCEDURE — 96376 TX/PRO/DX INJ SAME DRUG ADON: CPT

## 2023-08-14 PROCEDURE — 84443 ASSAY THYROID STIM HORMONE: CPT

## 2023-08-14 PROCEDURE — 80053 COMPREHEN METABOLIC PANEL: CPT | Performed by: PHYSICIAN ASSISTANT

## 2023-08-14 PROCEDURE — 99900035 HC TECH TIME PER 15 MIN (STAT)

## 2023-08-14 PROCEDURE — 85730 THROMBOPLASTIN TIME PARTIAL: CPT

## 2023-08-14 PROCEDURE — 63600175 PHARM REV CODE 636 W HCPCS: Performed by: STUDENT IN AN ORGANIZED HEALTH CARE EDUCATION/TRAINING PROGRAM

## 2023-08-14 PROCEDURE — G0378 HOSPITAL OBSERVATION PER HR: HCPCS

## 2023-08-14 PROCEDURE — 99285 EMERGENCY DEPT VISIT HI MDM: CPT | Mod: 25

## 2023-08-14 PROCEDURE — 83880 ASSAY OF NATRIURETIC PEPTIDE: CPT | Performed by: PHYSICIAN ASSISTANT

## 2023-08-14 PROCEDURE — 81001 URINALYSIS AUTO W/SCOPE: CPT

## 2023-08-14 PROCEDURE — 25000003 PHARM REV CODE 250

## 2023-08-14 PROCEDURE — 96375 TX/PRO/DX INJ NEW DRUG ADDON: CPT

## 2023-08-14 PROCEDURE — 86780 TREPONEMA PALLIDUM: CPT

## 2023-08-14 PROCEDURE — 96365 THER/PROPH/DIAG IV INF INIT: CPT

## 2023-08-14 PROCEDURE — 85610 PROTHROMBIN TIME: CPT

## 2023-08-14 PROCEDURE — 25000003 PHARM REV CODE 250: Performed by: EMERGENCY MEDICINE

## 2023-08-14 PROCEDURE — 84132 ASSAY OF SERUM POTASSIUM: CPT

## 2023-08-14 PROCEDURE — 84100 ASSAY OF PHOSPHORUS: CPT

## 2023-08-14 PROCEDURE — 83036 HEMOGLOBIN GLYCOSYLATED A1C: CPT

## 2023-08-14 PROCEDURE — 63600175 PHARM REV CODE 636 W HCPCS: Performed by: EMERGENCY MEDICINE

## 2023-08-14 PROCEDURE — 93005 ELECTROCARDIOGRAM TRACING: CPT

## 2023-08-14 PROCEDURE — 63600175 PHARM REV CODE 636 W HCPCS

## 2023-08-14 PROCEDURE — 84484 ASSAY OF TROPONIN QUANT: CPT | Performed by: PHYSICIAN ASSISTANT

## 2023-08-14 PROCEDURE — 82728 ASSAY OF FERRITIN: CPT

## 2023-08-14 PROCEDURE — 87389 HIV-1 AG W/HIV-1&-2 AB AG IA: CPT

## 2023-08-14 PROCEDURE — 80307 DRUG TEST PRSMV CHEM ANLYZR: CPT

## 2023-08-14 PROCEDURE — 83550 IRON BINDING TEST: CPT

## 2023-08-14 PROCEDURE — 80061 LIPID PANEL: CPT

## 2023-08-14 PROCEDURE — 83605 ASSAY OF LACTIC ACID: CPT

## 2023-08-14 RX ORDER — HYDRALAZINE HYDROCHLORIDE 20 MG/ML
10 INJECTION INTRAMUSCULAR; INTRAVENOUS EVERY 4 HOURS PRN
Status: DISCONTINUED | OUTPATIENT
Start: 2023-08-14 | End: 2023-08-16 | Stop reason: HOSPADM

## 2023-08-14 RX ORDER — ALBUTEROL SULFATE 90 UG/1
2 AEROSOL, METERED RESPIRATORY (INHALATION) EVERY 6 HOURS PRN
Status: DISCONTINUED | OUTPATIENT
Start: 2023-08-14 | End: 2023-08-16 | Stop reason: HOSPADM

## 2023-08-14 RX ORDER — GLUCAGON 1 MG
1 KIT INJECTION
Status: DISCONTINUED | OUTPATIENT
Start: 2023-08-14 | End: 2023-08-16 | Stop reason: HOSPADM

## 2023-08-14 RX ORDER — DILTIAZEM HYDROCHLORIDE 180 MG/1
360 CAPSULE, COATED, EXTENDED RELEASE ORAL DAILY
Status: DISCONTINUED | OUTPATIENT
Start: 2023-08-15 | End: 2023-08-16 | Stop reason: HOSPADM

## 2023-08-14 RX ORDER — ATORVASTATIN CALCIUM 40 MG/1
40 TABLET, FILM COATED ORAL DAILY
Status: DISCONTINUED | OUTPATIENT
Start: 2023-08-15 | End: 2023-08-16 | Stop reason: HOSPADM

## 2023-08-14 RX ORDER — POTASSIUM CHLORIDE 20 MEQ/1
40 TABLET, EXTENDED RELEASE ORAL ONCE
Status: COMPLETED | OUTPATIENT
Start: 2023-08-14 | End: 2023-08-14

## 2023-08-14 RX ORDER — SODIUM CHLORIDE 0.9 % (FLUSH) 0.9 %
10 SYRINGE (ML) INJECTION EVERY 12 HOURS PRN
Status: DISCONTINUED | OUTPATIENT
Start: 2023-08-14 | End: 2023-08-16 | Stop reason: HOSPADM

## 2023-08-14 RX ORDER — IBUPROFEN 200 MG
16 TABLET ORAL
Status: DISCONTINUED | OUTPATIENT
Start: 2023-08-14 | End: 2023-08-16 | Stop reason: HOSPADM

## 2023-08-14 RX ORDER — NAPROXEN SODIUM 220 MG/1
81 TABLET, FILM COATED ORAL DAILY
Status: DISCONTINUED | OUTPATIENT
Start: 2023-08-15 | End: 2023-08-16 | Stop reason: HOSPADM

## 2023-08-14 RX ORDER — POTASSIUM CHLORIDE 7.45 MG/ML
10 INJECTION INTRAVENOUS
Status: COMPLETED | OUTPATIENT
Start: 2023-08-14 | End: 2023-08-14

## 2023-08-14 RX ORDER — NIFEDIPINE 30 MG/1
30 TABLET, EXTENDED RELEASE ORAL DAILY
Status: DISCONTINUED | OUTPATIENT
Start: 2023-08-15 | End: 2023-08-15

## 2023-08-14 RX ORDER — INSULIN ASPART 100 [IU]/ML
0-5 INJECTION, SOLUTION INTRAVENOUS; SUBCUTANEOUS
Status: DISCONTINUED | OUTPATIENT
Start: 2023-08-14 | End: 2023-08-16 | Stop reason: HOSPADM

## 2023-08-14 RX ORDER — SPIRONOLACTONE 25 MG/1
50 TABLET ORAL DAILY
Status: DISCONTINUED | OUTPATIENT
Start: 2023-08-15 | End: 2023-08-16 | Stop reason: HOSPADM

## 2023-08-14 RX ORDER — NALOXONE HCL 0.4 MG/ML
0.02 VIAL (ML) INJECTION
Status: DISCONTINUED | OUTPATIENT
Start: 2023-08-14 | End: 2023-08-16 | Stop reason: HOSPADM

## 2023-08-14 RX ORDER — POTASSIUM CHLORIDE 7.45 MG/ML
40 INJECTION INTRAVENOUS ONCE
Status: COMPLETED | OUTPATIENT
Start: 2023-08-14 | End: 2023-08-15

## 2023-08-14 RX ORDER — LOSARTAN POTASSIUM 25 MG/1
50 TABLET ORAL DAILY
Status: DISCONTINUED | OUTPATIENT
Start: 2023-08-15 | End: 2023-08-15

## 2023-08-14 RX ORDER — LABETALOL HCL 20 MG/4 ML
10 SYRINGE (ML) INTRAVENOUS EVERY 4 HOURS PRN
Status: CANCELLED | OUTPATIENT
Start: 2023-08-14 | End: 2023-09-13

## 2023-08-14 RX ORDER — HYDRALAZINE HYDROCHLORIDE 20 MG/ML
10 INJECTION INTRAMUSCULAR; INTRAVENOUS EVERY 4 HOURS PRN
Status: DISCONTINUED | OUTPATIENT
Start: 2023-08-14 | End: 2023-08-14

## 2023-08-14 RX ORDER — METOPROLOL SUCCINATE 50 MG/1
200 TABLET, EXTENDED RELEASE ORAL 2 TIMES DAILY
Status: DISCONTINUED | OUTPATIENT
Start: 2023-08-14 | End: 2023-08-16 | Stop reason: HOSPADM

## 2023-08-14 RX ORDER — FUROSEMIDE 10 MG/ML
40 INJECTION INTRAMUSCULAR; INTRAVENOUS
Status: COMPLETED | OUTPATIENT
Start: 2023-08-14 | End: 2023-08-14

## 2023-08-14 RX ORDER — SODIUM CHLORIDE, SODIUM LACTATE, POTASSIUM CHLORIDE, CALCIUM CHLORIDE 600; 310; 30; 20 MG/100ML; MG/100ML; MG/100ML; MG/100ML
INJECTION, SOLUTION INTRAVENOUS ONCE
Status: COMPLETED | OUTPATIENT
Start: 2023-08-14 | End: 2023-08-15

## 2023-08-14 RX ORDER — IBUPROFEN 200 MG
24 TABLET ORAL
Status: DISCONTINUED | OUTPATIENT
Start: 2023-08-14 | End: 2023-08-16 | Stop reason: HOSPADM

## 2023-08-14 RX ADMIN — POTASSIUM CHLORIDE 40 MEQ: 1500 TABLET, EXTENDED RELEASE ORAL at 08:08

## 2023-08-14 RX ADMIN — NITROGLYCERIN 2 INCH: 20 OINTMENT TOPICAL at 11:08

## 2023-08-14 RX ADMIN — METOPROLOL SUCCINATE 200 MG: 50 TABLET, EXTENDED RELEASE ORAL at 08:08

## 2023-08-14 RX ADMIN — POTASSIUM CHLORIDE 10 MEQ: 7.46 INJECTION, SOLUTION INTRAVENOUS at 01:08

## 2023-08-14 RX ADMIN — POTASSIUM CHLORIDE 40 MEQ: 1500 TABLET, EXTENDED RELEASE ORAL at 01:08

## 2023-08-14 RX ADMIN — SODIUM CHLORIDE, POTASSIUM CHLORIDE, SODIUM LACTATE AND CALCIUM CHLORIDE 500 ML: 600; 310; 30; 20 INJECTION, SOLUTION INTRAVENOUS at 08:08

## 2023-08-14 RX ADMIN — SODIUM CHLORIDE, POTASSIUM CHLORIDE, SODIUM LACTATE AND CALCIUM CHLORIDE: 600; 310; 30; 20 INJECTION, SOLUTION INTRAVENOUS at 10:08

## 2023-08-14 RX ADMIN — POTASSIUM CHLORIDE 10 MEQ: 7.46 INJECTION, SOLUTION INTRAVENOUS at 02:08

## 2023-08-14 RX ADMIN — FUROSEMIDE 40 MG: 10 INJECTION, SOLUTION INTRAMUSCULAR; INTRAVENOUS at 11:08

## 2023-08-14 RX ADMIN — HYDRALAZINE HYDROCHLORIDE 10 MG: 20 INJECTION INTRAMUSCULAR; INTRAVENOUS at 03:08

## 2023-08-14 RX ADMIN — POTASSIUM CHLORIDE 40 MEQ: 7.46 INJECTION, SOLUTION INTRAVENOUS at 09:08

## 2023-08-14 NOTE — ED PROVIDER NOTES
Encounter Date: 8/14/2023       History     Chief Complaint   Patient presents with    Leg Swelling     Bilateral lower leg swelling x1 week,denies CP or SOB. +hypertensive     Increasing bilateral lower extremity edema for about a week, denies recent medication change, states good compliance with medications although he did not take them this morning.  Denies chest pain, dyspnea, fever, or pain other than discomfort from the edema in question.  Complex underlying history includes obesity, atrial fibrillation with chronic anticoagulation, congestive heart failure, myocardial infarction, pacemaker, multiple others as listed.  Noted significantly hypertensive on arrival.    The history is provided by the patient. No  was used.     Review of patient's allergies indicates:  No Known Allergies  Past Medical History:   Diagnosis Date    Arthritis     Atrial fibrillation     BPH (benign prostatic hyperplasia)     Cardiac arrest     Coronary artery disease     Cyst, kidney, acquired     Diverticulosis     Hyperlipidemia     Hypertension     MI (myocardial infarction)     Obesity     Steatosis of liver      Past Surgical History:   Procedure Laterality Date    A-V CARDIAC PACEMAKER INSERTION Right     CARDIAC CATHETERIZATION      COLONOSCOPY W/ BIOPSIES      excision of colon       Family History   Problem Relation Age of Onset    Hypertension Mother     Hypertension Father     Hypertension Sister      Social History     Tobacco Use    Smoking status: Former     Current packs/day: 0.00    Smokeless tobacco: Never   Substance Use Topics    Alcohol use: Not Currently    Drug use: Not Currently     Review of Systems   Constitutional:  Negative for chills and fever.   HENT:  Negative for congestion, facial swelling, nosebleeds and sinus pressure.    Eyes:  Negative for pain and redness.   Respiratory:  Negative for chest tightness, shortness of breath and wheezing.    Cardiovascular:  Positive for leg  swelling. Negative for chest pain and palpitations.   Gastrointestinal:  Negative for abdominal distention, abdominal pain, diarrhea, nausea and vomiting.   Endocrine: Negative for cold intolerance, polydipsia and polyphagia.   Genitourinary:  Negative for difficulty urinating, dysuria, frequency and hematuria.   Musculoskeletal:  Negative for arthralgias, back pain, myalgias and neck pain.   Skin:  Negative for color change and rash.   Neurological:  Negative for dizziness, weakness, numbness and headaches.   Hematological:  Negative for adenopathy. Does not bruise/bleed easily.   Psychiatric/Behavioral:  Negative for agitation and behavioral problems.    All other systems reviewed and are negative.      Physical Exam     Initial Vitals [08/14/23 0808]   BP Pulse Resp Temp SpO2   (!) 165/128 87 18 98.2 °F (36.8 °C) 99 %      MAP       --         Physical Exam    Nursing note and vitals reviewed.  Constitutional: He appears well-developed and well-nourished. He is not diaphoretic. He appears distressed.   Mildly uncomfortable; moderately obese   HENT:   Head: Normocephalic and atraumatic.   Mouth/Throat: Oropharynx is clear and moist. No oropharyngeal exudate.   Eyes: Conjunctivae and EOM are normal. Pupils are equal, round, and reactive to light. Right eye exhibits no discharge. Left eye exhibits no discharge. No scleral icterus.   Neck: Neck supple. No thyromegaly present. No tracheal deviation present. No JVD present.   Normal range of motion.  Cardiovascular:  Normal rate and normal heart sounds.     Exam reveals no gallop and no friction rub.       No murmur heard.  IRRR   Pulmonary/Chest: Breath sounds normal. No respiratory distress. He has no wheezes. He has no rhonchi. He has no rales. He exhibits no tenderness.   Abdominal: Abdomen is soft. Bowel sounds are normal. He exhibits no distension and no mass. There is no abdominal tenderness. There is no rebound and no guarding.   Musculoskeletal:          General: Edema present. No tenderness. Normal range of motion.      Cervical back: Normal range of motion and neck supple.      Comments: 2-3 (+) BLE pitting edema to upper calves     Lymphadenopathy:     He has no cervical adenopathy.   Neurological: He is alert and oriented to person, place, and time. He has normal strength. No cranial nerve deficit.   Skin: Skin is warm and dry. No rash noted. No erythema.   Psychiatric: He has a normal mood and affect. His behavior is normal. Judgment and thought content normal.         ED Course   Procedures  Labs Reviewed   B-TYPE NATRIURETIC PEPTIDE - Abnormal; Notable for the following components:       Result Value    Natriuretic Peptide 329.2 (*)     All other components within normal limits   COMPREHENSIVE METABOLIC PANEL - Abnormal; Notable for the following components:    Potassium Level 2.9 (*)     All other components within normal limits   CBC WITH DIFFERENTIAL - Abnormal; Notable for the following components:    RBC 4.61 (*)     Hgb 13.9 (*)     Hct 41.2 (*)     All other components within normal limits   TROPONIN I - Normal   CBC W/ AUTO DIFFERENTIAL    Narrative:     The following orders were created for panel order CBC auto differential.  Procedure                               Abnormality         Status                     ---------                               -----------         ------                     CBC with Differential[[id number]]        Abnormal            Final result                 Please view results for these tests on the individual orders.   EXTRA TUBES    Narrative:     The following orders were created for panel order EXTRA TUBES.  Procedure                               Abnormality         Status                     ---------                               -----------         ------                     Light Blue Top Hold[[id number]]                              In process                 Gold Top Hold[[id number]]                                     In process                 Pink Top Hold[[id number]]                                    In process                   Please view results for these tests on the individual orders.   LIGHT BLUE TOP HOLD   GOLD TOP HOLD   PINK TOP HOLD     EKG Readings: (Independently Interpreted)   Initial Reading: No STEMI. Rhythm: Atrial Fibrillation. Heart Rate: 101.   Atrial fibrillation with rapid ventricular response at 101 beats per minute, slightly noisy baseline, lateral ST and T-wave changes.  No significant change from baseline.     ECG Results              EKG 12-lead (In process)  Result time 08/14/23 10:53:53      In process by Interface, Lab In [facility] (08/14/23 10:53:53)                   Narrative:    Test Reason : I48.91,    Vent. Rate : 101 BPM     Atrial Rate : 105 BPM     P-R Int : 000 ms          QRS Dur : 090 ms      QT Int : 368 ms       P-R-T Axes : 000 023 226 degrees     QTc Int : 477 ms    Atrial fibrillation with rapid ventricular response  ST and T wave abnormality, consider lateral ischemia  Abnormal ECG  When compared with ECG of 11-AUG-2023 02:23,  No significant change was found    Referred By: [email or web address]   SELF           Confirmed By:                                   Imaging Results              X-Ray Chest AP Portable (Final result)  Result time 08/14/23 11:38:10      Final result by Sami Taylor MD (08/14/23 11:38:10)                   Impression:      NO ACUTE CARDIOPULMONARY PROCESS IDENTIFIED.      Electronically signed by: Sami Taylor  Date:    08/14/2023  Time:    11:38               Narrative:    EXAMINATION:  XR CHEST AP PORTABLE    CLINICAL HISTORY:  Chest Pain;    TECHNIQUE:  One view    COMPARISON:  August 11, 2020.    FINDINGS:  Cardiopericardial silhouette enlarged appearance is similar.  Right chest implanted cardiac device electrodes terminate within the right atrium and the right ventricle.  No acute dense focal or segmental consolidation, congestive process, pleural effusions  or pneumothorax.                                       Medications   potassium chloride 10 mEq in 100 mL IVPB (has no administration in time range)   potassium chloride SA CR tablet 40 mEq (has no administration in time range)   furosemide injection 40 mg (40 mg Intravenous Given 8/14/23 1124)   nitroGLYCERIN 2% TD oint ointment 2 inch (2 inches Topical (Top) Given 8/14/23 1125)       11:54 AM Consulting Int Med.       Additional MDM:   Differential Diagnosis:   CHF/ vol overload/ electrolyte imbalance                   Medical Decision Making  Problems Addressed:  Bilateral lower extremity edema: chronic illness or injury  Congestive heart failure, unspecified HF chronicity, unspecified heart failure type: chronic illness or injury with exacerbation, progression, or side effects of treatment  Hypertension, poor control: chronic illness or injury with exacerbation, progression, or side effects of treatment  Hypokalemia: acute illness or injury    Amount and/or Complexity of Data Reviewed  Radiology: ordered.    Risk  Prescription drug management.             Clinical Impression:   Final diagnoses:  [I48.91] Atrial fibrillation  [I50.9] Congestive heart failure, unspecified HF chronicity, unspecified heart failure type (Primary)  [R60.0] Bilateral lower extremity edema  [I10] Hypertension, poor control  [E87.6] Hypokalemia        ED Disposition Condition    Observation Stable                Tani Neff MD  08/14/23 1201

## 2023-08-14 NOTE — FIRST PROVIDER EVALUATION
Medical screening examination initiated.  I have conducted a focused provider triage encounter, findings are as follows:    Brief history of present illness: 68 y.o. male who presents to the ED complaining of bilateral LE edema x 1 week. Was seen in ED on 8/11 and given lasix with no improvement. Did not take any of his meds this morning.    Vitals:    08/14/23 0808   BP: (!) 165/128   Pulse: 87   Resp: 18   Temp: 98.2 °F (36.8 °C)   TempSrc: Temporal   SpO2: 99%   Weight: 107.5 kg (237 lb)       Pertinent physical exam:  resting comfortably in NAD    Brief workup plan:  CBC, CMP, troponin, BNP    Preliminary workup initiated; this workup will be continued and followed by the physician or advanced practice provider that is assigned to the patient when roomed.

## 2023-08-15 LAB
ALBUMIN SERPL-MCNC: 3.4 G/DL (ref 3.4–4.8)
ALBUMIN/GLOB SERPL: 1.1 RATIO (ref 1.1–2)
ALP SERPL-CCNC: 54 UNIT/L (ref 40–150)
ALT SERPL-CCNC: 18 UNIT/L (ref 0–55)
ANION GAP SERPL CALC-SCNC: 8 MEQ/L
AST SERPL-CCNC: 24 UNIT/L (ref 5–34)
AV INDEX (PROSTH): 0.53
AV MEAN GRADIENT: 3 MMHG
AV PEAK GRADIENT: 6 MMHG
AV VALVE AREA BY VELOCITY RATIO: 1.44 CM²
AV VALVE AREA: 1.68 CM²
AV VELOCITY RATIO: 0.46
BASOPHILS # BLD AUTO: 0.06 X10(3)/MCL
BASOPHILS NFR BLD AUTO: 0.8 %
BILIRUB SERPL-MCNC: 1.3 MG/DL
BSA FOR ECHO PROCEDURE: 2.29 M2
BUN SERPL-MCNC: 10.9 MG/DL (ref 8.4–25.7)
BUN SERPL-MCNC: 7.6 MG/DL (ref 8.4–25.7)
CALCIUM SERPL-MCNC: 8.8 MG/DL (ref 8.8–10)
CALCIUM SERPL-MCNC: 8.8 MG/DL (ref 8.8–10)
CHLORIDE SERPL-SCNC: 106 MMOL/L (ref 98–107)
CHLORIDE SERPL-SCNC: 107 MMOL/L (ref 98–107)
CHOLEST SERPL-MCNC: 127 MG/DL
CHOLEST/HDLC SERPL: 4 {RATIO} (ref 0–5)
CO2 SERPL-SCNC: 23 MMOL/L (ref 23–31)
CO2 SERPL-SCNC: 26 MMOL/L (ref 23–31)
CREAT SERPL-MCNC: 0.87 MG/DL (ref 0.73–1.18)
CREAT SERPL-MCNC: 1.14 MG/DL (ref 0.73–1.18)
CREAT/UREA NIT SERPL: 10
CV ECHO LV RWT: 0.8 CM
DOP CALC AO PEAK VEL: 1.23 M/S
DOP CALC AO VTI: 21 CM
DOP CALC LVOT AREA: 3.2 CM2
DOP CALC LVOT DIAMETER: 2.01 CM
DOP CALC LVOT PEAK VEL: 0.56 M/S
DOP CALC LVOT STROKE VOLUME: 35.2 CM3
DOP CALC MV VTI: 17.4 CM
DOP CALCLVOT PEAK VEL VTI: 11.1 CM
E WAVE DECELERATION TIME: 195.93 MSEC
E/A RATIO: 116
ECHO LV POSTERIOR WALL: 1.47 CM (ref 0.6–1.1)
EOSINOPHIL # BLD AUTO: 0.2 X10(3)/MCL (ref 0–0.9)
EOSINOPHIL NFR BLD AUTO: 2.6 %
ERYTHROCYTE [DISTWIDTH] IN BLOOD BY AUTOMATED COUNT: 14.6 % (ref 11.5–17)
FRACTIONAL SHORTENING: 26 % (ref 28–44)
GFR SERPLBLD CREATININE-BSD FMLA CKD-EPI: >60 MLS/MIN/1.73/M2
GFR SERPLBLD CREATININE-BSD FMLA CKD-EPI: >60 MLS/MIN/1.73/M2
GLOBULIN SER-MCNC: 3.2 GM/DL (ref 2.4–3.5)
GLUCOSE SERPL-MCNC: 87 MG/DL (ref 82–115)
GLUCOSE SERPL-MCNC: 92 MG/DL (ref 82–115)
HCT VFR BLD AUTO: 41.9 % (ref 42–52)
HDLC SERPL-MCNC: 31 MG/DL (ref 35–60)
HGB BLD-MCNC: 14.1 G/DL (ref 14–18)
IMM GRANULOCYTES # BLD AUTO: 0.04 X10(3)/MCL (ref 0–0.04)
IMM GRANULOCYTES NFR BLD AUTO: 0.5 %
INTERVENTRICULAR SEPTUM: 1.74 CM (ref 0.6–1.1)
LACTATE SERPL-SCNC: 1.8 MMOL/L (ref 0.5–2.2)
LDLC SERPL CALC-MCNC: 75 MG/DL (ref 50–140)
LEFT ATRIUM SIZE: 5.99 CM
LEFT INTERNAL DIMENSION IN SYSTOLE: 2.71 CM (ref 2.1–4)
LEFT VENTRICLE DIASTOLIC VOLUME INDEX: 25.77 ML/M2
LEFT VENTRICLE DIASTOLIC VOLUME: 57.47 ML
LEFT VENTRICLE MASS INDEX: 104 G/M2
LEFT VENTRICLE SYSTOLIC VOLUME INDEX: 12.2 ML/M2
LEFT VENTRICLE SYSTOLIC VOLUME: 27.18 ML
LEFT VENTRICULAR INTERNAL DIMENSION IN DIASTOLE: 3.68 CM (ref 3.5–6)
LEFT VENTRICULAR MASS: 231.27 G
LV LATERAL E/E' RATIO: 10.55 M/S
LVOT MG: 0.69 MMHG
LVOT MV: 0.38 CM/S
LYMPHOCYTES # BLD AUTO: 1.93 X10(3)/MCL (ref 0.6–4.6)
LYMPHOCYTES NFR BLD AUTO: 24.9 %
MAGNESIUM SERPL-MCNC: 1.7 MG/DL (ref 1.6–2.6)
MCH RBC QN AUTO: 29.8 PG (ref 27–31)
MCHC RBC AUTO-ENTMCNC: 33.7 G/DL (ref 33–36)
MCV RBC AUTO: 88.6 FL (ref 80–94)
MONOCYTES # BLD AUTO: 0.85 X10(3)/MCL (ref 0.1–1.3)
MONOCYTES NFR BLD AUTO: 11 %
MV MEAN GRADIENT: 2 MMHG
MV PEAK A VEL: 0.01 M/S
MV PEAK E VEL: 1.16 M/S
MV PEAK GRADIENT: 5 MMHG
MV STENOSIS PRESSURE HALF TIME: 56.82 MS
MV VALVE AREA BY CONTINUITY EQUATION: 2.02 CM2
MV VALVE AREA P 1/2 METHOD: 3.87 CM2
NEUTROPHILS # BLD AUTO: 4.66 X10(3)/MCL (ref 2.1–9.2)
NEUTROPHILS NFR BLD AUTO: 60.2 %
NRBC BLD AUTO-RTO: 0 %
PHOSPHATE SERPL-MCNC: 3.2 MG/DL (ref 2.3–4.7)
PISA TR MAX VEL: 1.8 M/S
PLATELET # BLD AUTO: 227 X10(3)/MCL (ref 130–400)
PMV BLD AUTO: 10.2 FL (ref 7.4–10.4)
POCT GLUCOSE: 87 MG/DL (ref 70–110)
POTASSIUM SERPL-SCNC: 3.4 MMOL/L (ref 3.5–5.1)
POTASSIUM SERPL-SCNC: 3.6 MMOL/L (ref 3.5–5.1)
PROT SERPL-MCNC: 6.6 GM/DL (ref 5.8–7.6)
RBC # BLD AUTO: 4.73 X10(6)/MCL (ref 4.7–6.1)
RIGHT VENTRICULAR END-DIASTOLIC DIMENSION: 3.09 CM
SODIUM SERPL-SCNC: 140 MMOL/L (ref 136–145)
SODIUM SERPL-SCNC: 141 MMOL/L (ref 136–145)
TDI LATERAL: 0.11 M/S
TR MAX PG: 13 MMHG
TRIGL SERPL-MCNC: 103 MG/DL (ref 34–140)
VLDLC SERPL CALC-MCNC: 21 MG/DL
WBC # SPEC AUTO: 7.74 X10(3)/MCL (ref 4.5–11.5)
Z-SCORE OF LEFT VENTRICULAR DIMENSION IN END DIASTOLE: -7.62
Z-SCORE OF LEFT VENTRICULAR DIMENSION IN END SYSTOLE: -4.48

## 2023-08-15 PROCEDURE — 97166 OT EVAL MOD COMPLEX 45 MIN: CPT

## 2023-08-15 PROCEDURE — 83735 ASSAY OF MAGNESIUM: CPT

## 2023-08-15 PROCEDURE — 94761 N-INVAS EAR/PLS OXIMETRY MLT: CPT

## 2023-08-15 PROCEDURE — 63600175 PHARM REV CODE 636 W HCPCS

## 2023-08-15 PROCEDURE — G0378 HOSPITAL OBSERVATION PER HR: HCPCS

## 2023-08-15 PROCEDURE — 25500020 PHARM REV CODE 255: Performed by: INTERNAL MEDICINE

## 2023-08-15 PROCEDURE — 99900035 HC TECH TIME PER 15 MIN (STAT)

## 2023-08-15 PROCEDURE — 25000003 PHARM REV CODE 250

## 2023-08-15 PROCEDURE — 84100 ASSAY OF PHOSPHORUS: CPT

## 2023-08-15 PROCEDURE — 80053 COMPREHEN METABOLIC PANEL: CPT

## 2023-08-15 PROCEDURE — 85025 COMPLETE CBC W/AUTO DIFF WBC: CPT

## 2023-08-15 PROCEDURE — 83605 ASSAY OF LACTIC ACID: CPT

## 2023-08-15 PROCEDURE — 97162 PT EVAL MOD COMPLEX 30 MIN: CPT

## 2023-08-15 PROCEDURE — 93005 ELECTROCARDIOGRAM TRACING: CPT

## 2023-08-15 RX ORDER — LOSARTAN POTASSIUM 25 MG/1
100 TABLET ORAL DAILY
Status: DISCONTINUED | OUTPATIENT
Start: 2023-08-16 | End: 2023-08-16 | Stop reason: HOSPADM

## 2023-08-15 RX ORDER — MAGNESIUM SULFATE 1 G/100ML
1 INJECTION INTRAVENOUS ONCE
Status: COMPLETED | OUTPATIENT
Start: 2023-08-15 | End: 2023-08-15

## 2023-08-15 RX ORDER — POTASSIUM CHLORIDE 20 MEQ/1
40 TABLET, EXTENDED RELEASE ORAL DAILY
Status: DISCONTINUED | OUTPATIENT
Start: 2023-08-15 | End: 2023-08-16

## 2023-08-15 RX ADMIN — HUMAN ALBUMIN MICROSPHERES AND PERFLUTREN 0.44 MG: 10; .22 INJECTION, SOLUTION INTRAVENOUS at 08:08

## 2023-08-15 RX ADMIN — DILTIAZEM HYDROCHLORIDE 360 MG: 180 CAPSULE, COATED, EXTENDED RELEASE ORAL at 08:08

## 2023-08-15 RX ADMIN — POTASSIUM CHLORIDE 40 MEQ: 1500 TABLET, EXTENDED RELEASE ORAL at 10:08

## 2023-08-15 RX ADMIN — ASPIRIN 81 MG: 81 TABLET, CHEWABLE ORAL at 08:08

## 2023-08-15 RX ADMIN — HYDRALAZINE HYDROCHLORIDE 10 MG: 20 INJECTION INTRAMUSCULAR; INTRAVENOUS at 04:08

## 2023-08-15 RX ADMIN — SPIRONOLACTONE 50 MG: 25 TABLET ORAL at 08:08

## 2023-08-15 RX ADMIN — LOSARTAN POTASSIUM 50 MG: 25 TABLET, FILM COATED ORAL at 08:08

## 2023-08-15 RX ADMIN — MAGNESIUM SULFATE IN DEXTROSE 1 G: 10 INJECTION, SOLUTION INTRAVENOUS at 11:08

## 2023-08-15 RX ADMIN — RIVAROXABAN 20 MG: 10 TABLET, FILM COATED ORAL at 05:08

## 2023-08-15 RX ADMIN — ATORVASTATIN CALCIUM 40 MG: 40 TABLET, FILM COATED ORAL at 08:08

## 2023-08-15 RX ADMIN — METOPROLOL SUCCINATE 200 MG: 50 TABLET, EXTENDED RELEASE ORAL at 08:08

## 2023-08-15 NOTE — MEDICAL/APP STUDENT
[facility] Medicine Wards Progress Note     Resident Team: [facility] Medicine List 3  Attending Physician: Jesus Daniel MD  Medical Student: Josephine Mari      Subjective:      Brief HPI:  Delio Daniel is a 69 yo male with PMH of afib, HFpEF, HTN, HLD, CAD, NSTEMI () and PPM () which was upgraded to dual-chamber ICD (2018) secondary to VFIB arrest (3/2018). He presented to the ED on  c/o BLE edema for 1 week. He denied any other complaints at that time. He denied recent changes in his medications and reported that he had been adherent to his medication regimen. He was seen in the ED on  for similar complaints but was discharged that day after diuresis and stable condition. He was seen by cardiology on  who referred him to Dr. Hernandez for AV steven ablation, however he did not go to that appointment and was then referred to EP in [facility]. At that appointment he was advised to continue his current medication regimen, present for EP in [facility] for BiV upgrade, and return for device check in 3 months.    Hospital Course:  Mr. Daniel was hypertensive on arrival to the ED at 165/128. He also presented with a BNP of 329 and K of 2.9. His EKG showed afib with RVR and ST depressions and T wave inversions in the lateral leads. Troponin was 0.018. CXR showed cardiomegaly unchanged from prior study. He was given hydralazine and KCL 80meq. He was diuresed with Lasix 40mg with 3 L urine output. He also had an elevated lactic acid which resolved with LR bolus.    Interval History:   NAEON. Denies chest pain and shortness of breath. Notes some improvement in LE edema. Requesting wound care for long toenails.    Review of Systems:  ROS completed and negative except as indicated above.     Objective:     Last 24 Hour Vital Signs:  BP  Min: 100/70  Max: 180/127  Temp  Av.1 °F (36.7 °C)  Min: 97.5 °F (36.4 °C)  Max: 98.7 °F (37.1 °C)  Pulse  Av.6  Min: 67  Max: 103  Resp  Av.5  Min: 18  Max: 20  SpO2  Av %  Min:  "95 %  Max: 100 %  Height  Av' 10" (177.8 cm)  Min: 5' 10" (177.8 cm)  Max: 5' 10" (177.8 cm)  Weight  Av.2 kg (234 lb 0.8 oz)  Min: 106.1 kg (234 lb)  Max: 106.2 kg (234 lb 1.6 oz)  I/O last 3 completed shifts:  In: 1680.2 [P.O.:440; I.V.:451.9; IV Piggyback:788.3]  Out: 3850 [Urine:3850]    Physical Examination:  Physical Exam  Vitals reviewed.   Constitutional:       General: He is not in acute distress.     Appearance: Normal appearance. He is obese. He is not ill-appearing.      Comments: Resting comfortably in bed.   Eyes:      Extraocular Movements: Extraocular movements intact.      Conjunctiva/sclera: Conjunctivae normal.   Cardiovascular:      Rate and Rhythm: Normal rate. Rhythm irregularly irregular.      Heart sounds: Normal heart sounds. No murmur heard.  Pulmonary:      Effort: Pulmonary effort is normal.      Breath sounds: Normal breath sounds. No wheezing.   Chest:      Comments: Pacemaker in place to right chest.  Abdominal:      General: Abdomen is flat. Bowel sounds are normal. There is no distension.      Palpations: Abdomen is soft.   Musculoskeletal:      Right lower leg: Edema present.      Left lower leg: Edema present.   Skin:     General: Skin is warm and dry.   Neurological:      General: No focal deficit present.      Mental Status: He is alert.   Psychiatric:         Mood and Affect: Mood normal.        Laboratory:  Most Recent Data:  CBC:   Recent Labs   Lab 23  0658 23  0147 23  0846 08/15/23  0333   WBC 7.81 6.16 5.41 7.74   HGB 14.2 13.6* 13.9* 14.1   HCT 42.9 40.4* 41.2* 41.9*    211 223 227   MCV 89.6 88.2 89.4 88.6     BMP:   Lab Results   Component Value Date     08/15/2023    K 3.6 08/15/2023    CO2 23 08/15/2023    BUN 7.6 (L) 08/15/2023    CREATININE 0.87 08/15/2023    CALCIUM 8.8 08/15/2023    MG 1.70 08/15/2023    PHOS 3.2 08/15/2023     LFTs:   Lab Results   Component Value Date    ALBUMIN 3.4 08/15/2023    BILITOT 1.3 08/15/2023    " AST 24 08/15/2023    ALKPHOS 54 08/15/2023    ALT 18 08/15/2023     Coags:   Lab Results   Component Value Date    INR 1.0 08/14/2023    PROTIME 13.3 08/14/2023    PTT 29.1 08/14/2023     FLP:   Lab Results   Component Value Date    CHOL 127 08/14/2023    HDL 31 (L) 08/14/2023    TRIG 103 08/14/2023     DM:   Lab Results   Component Value Date    HGBA1C 5.4 08/14/2023    HGBA1C 6.1 07/02/2021    HGBA1C 6.5 07/21/2020    CREATININE 0.87 08/15/2023     Thyroid:   Lab Results   Component Value Date    TSH 2.901 08/14/2023     Anemia:   Lab Results   Component Value Date    IRON 66 08/14/2023    TIBC 340 08/14/2023    FERRITIN 81.39 08/14/2023     Cardiac:   Lab Results   Component Value Date    TROPONINI 0.018 08/14/2023    .2 (H) 08/14/2023     Other Results:  8/15 TTE: Rhythm is atrial fibrillation. There is normal systolic function in left ventricle with a visually estimated ejection fraction of 55 - 60%. Unable to assess due to atrial fibrillation.  Left atrium is dilated. Normal right ventricular cavity size. The aortic valve is a trileaflet valve. There is mild regurgitation of the mitral valve with an anteromedial eccentrically directed jet.     8/15 EKG (my interpretation): atrial fibrillation, rate 101 with rapid ventricular response. ST depressions and T wave inversions in leads I, V5, V6.    8/14 EKG (my interpretation): atrial fibrillation, rate 105 with rapid ventricular response. ST depressions and T wave inversions in leads I, V5, V6.     Radiology:  Imaging Results              X-Ray Chest AP Portable (Final result)  Result time 08/14/23 11:38:10      Final result by Sami Taylor MD (08/14/23 11:38:10)                   Impression:      NO ACUTE CARDIOPULMONARY PROCESS IDENTIFIED.      Electronically signed by: Sami Taylor  Date:    08/14/2023  Time:    11:38               Narrative:    EXAMINATION:  XR CHEST AP PORTABLE    CLINICAL HISTORY:  Chest Pain;    TECHNIQUE:  One  view    COMPARISON:  August 11, 2020.    FINDINGS:  Cardiopericardial silhouette enlarged appearance is similar.  Right chest implanted cardiac device electrodes terminate within the right atrium and the right ventricle.  No acute dense focal or segmental consolidation, congestive process, pleural effusions or pneumothorax.                                      Current Medications:     Scheduled:   aspirin  81 mg Oral Daily    atorvastatin  40 mg Oral Daily    diltiaZEM  360 mg Oral Daily    [START ON 8/16/2023] losartan  100 mg Oral Daily    metoprolol succinate  200 mg Oral BID    potassium chloride SA  40 mEq Oral Daily    rivaroxaban  20 mg Oral with dinner    spironolactone  50 mg Oral Daily        PRN:  albuterol, glucagon (human recombinant), glucose, glucose, hydrALAZINE, insulin aspart U-100, naloxone, sodium chloride 0.9%      Assessment & Plan:     HFpEF   CAD  HLD  BLE Edema       - March 2023 TTE EF of 50%       -  on admission       - 8/15 TTE showed EF 55-60% with afib, OCTAVIA, and mild MR       - Continue spironolactone 50mg qD, losartan 100mg qD, and metoprolol succinate 200mg BID       - Will discontinue nifedipine at this time as is it likely exacerbating his edema       - Continue atorvastatin 40mg qD       - Ordered BLE venous US       - Will continue to monitor     Atrial Fibrillation       - Continue rivaroxaban 20mg qD       - Currently rate controlled, last        - Will monitor and encourage follow up in [place] pending discharge    HTN       - /98 this AM       - Continue current medication regimen       - Hydralazine 10mg q4hrs PRN for SBP >160 and DBP >110      Hypokalemia       - K of 2.9 on admission, supplemented with 80 Kcl       - K of 3.6 this AM       - Continue KCl 20meq qD       - Repeat BMP ordered      Lactic Acidosis       - Elevated lactic acid on admission, given LR bolus       - Normal lactic acid this AM. Will d/c trending lactic acid.    CODE STATUS:  Full  Access: Peripheral  Antibiotics: N/A  Diet: Heart Healthy  DVT Prophylaxis: N/A  GI Prophylaxis: N/A  Fluids: N/A      Disposition: Saurabh Daniel is a 69 yo male with PMH afib, HFpEF, HTN, HLD, CAD, NSTEMI (2003), VFIB arrest (2018), and ICD placement (2018). He presented to the ED on 8/14 with BLE edema and hypokalemia, diuresed 3 L and supplemented with KCl. Edema improved this AM. Nifedipine discontinued as it likely exacerbated his edema. K of 3.6 this AM, will repeat BMP.       Josephine Mari MS4  [facility] Internal Medicine

## 2023-08-15 NOTE — PT/OT/SLP EVAL
Occupational Therapy   Evaluation and Discharge Note    Name: Delio Daniel Jr.  MRN: [id number]  Admitting Diagnosis:   Patient Active Problem List   Diagnosis    Neuroendocrine carcinoma of small bowel    Nodule of left lung    Longstanding persistent atrial fibrillation    Hypertension    Hyperlipidemia LDL goal <70    Hypokalemia    Coronary artery disease involving native heart without angina pectoris    Second degree AV block    Cardiac arrest with ventricular fibrillation    Cardiac pacemaker in situ    History of deep vein thrombosis (DVT) of lower extremity    Current use of long term anticoagulation    Hypertensive urgency    Final diagnoses:  [I48.91] Atrial fibrillation  [I50.9] Congestive heart failure, unspecified HF chronicity, unspecified heart failure type (Primary)  [R60.0] Bilateral lower extremity edema  [I10] Hypertension, poor control  [E87.6] Hypokalemia   Recent Surgery: * No surgery found *      Recommendations:     Discharge Recommendations: home  Discharge Equipment Recommendations: bath bench, other (see comments) (hurricane vs rollator)  Barriers to discharge:  None    Assessment:     Delio Daniel Jr. is a 68 y.o. male with a medical diagnosis of see above. At this time, patient is functioning at their prior level of function and does not require further acute OT services.     Plan:     During this hospitalization, patient does not require further acute OT services.  Please re-consult if situation changes.    Plan of Care Reviewed with: patient    Subjective     Chief Complaint: back and B LE distal pain  Patient/Family Comments/goals: return home    Occupational Profile:  Living Environment: Pt lives with his brother and nephew in single story house with 3 steps right handrail and tub only   Previous level of function: pt was mod independent basic self care tasks and ambulated with quad cane household level and community level  Roles and Routines: Pt does not drive ; pt utilizes public  "transportation frequently during the week; pt does not cook and brother and nephew perform household chores  Equipment Used at home: cane, quad, other (see comments) (note quad cane is "neighbors" and in poor condition)  Assistance upon Discharge: family     Pain/Comfort:  Pain Rating 1: 9/10  Location - Side 1: Bilateral  Location - Orientation 1: distal  Location 1: leg  Pain Addressed 1: Nurse notified, Distraction  Pain Rating Post-Intervention 1: 9/10  Pain Rating 2: 7/10  Location - Orientation 2: lower  Location 2: back  Pain Addressed 2: Distraction, Nurse notified, Reposition  Pain Rating Post-Intervention 2: 7/10    Patients cultural, spiritual, [employer]ism conflicts given the current situation: no    Objective:     Communicated with: Nurse Trivedi prior to session.  Patient found HOB elevated with peripheral IV, telemetry, Other (comments) (pacemaker) upon OT entry to room.    General Precautions: Standard, fall  Orthopedic Precautions: N/A  Braces: N/A  Respiratory Status: Room air     VITALS  Presession  Post session  /79   136/92  HR 69   70  O2 99%   99%    Occupational Performance:    Bed Mobility:    Patient completed Supine to Sit with modified independence    Functional Mobility/Transfers:  Patient completed Sit <> Stand Transfer with mod independent  with  quad cane and impacted by back pain    Patient completed Toilet Transfer Step Transfer technique with supervision with  grab bars and impacted by back pain  Functional Mobility: supervision ambulating in room for basic self care tasks due to impulsiveness due to increased back pain 7/10 at this time ; no LOB ; flexed posture due to back pain    Activities of Daily Living:  Feeding:  independence    Grooming: modified independence standing at sink  Lower Body Dressing: modified independence from bedside chair for socks and underwear  Toileting: modified independence with assist of grab bar for balance as needed     Cognitive/Visual " Perceptual:  Cognitive/Psychosocial Skills:     -       Oriented to: Person, Place, Time, and Situation   -       Follows Commands/attention:Follows multistep  commands  -       Safety awareness/insight to disability: fair    -       Mood/Affect/Coping skills/emotional control: Cooperative    Physical Exam:  Edema:  Moderate B LE distally   Sensation:    -       Intact  light/touch BUE and hands  Dominant hand: -       right  Upper Extremity Range of Motion:  -       Right Upper Extremity: WFL  -       Left Upper Extremity: WFL  Upper Extremity Strength:    -       Right Upper Extremity: WFL  -       Left Upper Extremity: WFL   Strength: -       Right Upper Extremity: WFL  -       Left Upper Extremity: WFL  Fine Motor Coordination:    -       Intact  Left hand thumb/finger opposition skills and Right hand thumb/finger opposition skills      Treatment & Education:  Pt. educated on orientation to environment, use of call bell for assist with  OOB  activity or for any other needs due to fall risk.     Patient left up in chair with all lines intact, call button in reach, and nurse  notified    GOALS:   Multidisciplinary Problems       Occupational Therapy Goals       Not on file                    History:     Past Medical History:   Diagnosis Date    Arthritis     Atrial fibrillation     BPH (benign prostatic hyperplasia)     Cardiac arrest     Coronary artery disease     Cyst, kidney, acquired     Diverticulosis     Hyperlipidemia     Hypertension     MI (myocardial infarction)     Obesity     Steatosis of liver          Past Surgical History:   Procedure Laterality Date    A-V CARDIAC PACEMAKER INSERTION Right     CARDIAC CATHETERIZATION      COLONOSCOPY W/ BIOPSIES      excision of colon         Time Tracking:     OT Date of Treatment: 08/15/23  OT Start Time: 1353  OT Stop Time: 1424  OT Total Time (min): 31 min    Billable Minutes:Evaluation 31 min     8/15/2023

## 2023-08-15 NOTE — NURSING
Dr Harding notified BP is still elevated. Order noted to ask pt what blood pressure meds he takes together and give those meds early

## 2023-08-15 NOTE — PROGRESS NOTES
[facility] INTERNAL MEDICINE  ADMISSION HISTORY AND PHYSICAL    Resident Team: [facility] Medicine List 3  Attending Physician: Jesus Daniel MD    Date of Admit: 8/14/2023    SUBJECTIVE:      HPI: Delio Daniel Jr. is a 68 y.o. male with PMH of atrial fibrillation on long-term anticoagulation, history of cardiac arrest and MI, HFrEF with EF of 50% via echo 02/09/2023, pacemaker in place, moderate to severe mitral regurgitation, moderate to severe left atrial enlargement, coronary artery disease, hyperlipidemia, hypertension, and hepatic steatosis presented to the ED with a CC of bilateral leg swelling for the last week.  Reports compliance with all medications.  Patient is on diuretics at home.  No other associated symptoms.  Patient denies shortness of breath, chest pain, numbness, weakness, tingling, jaw pain, abdominal pain, dysuria, hematuria, diaphoresis, rash, cough, congestion, rhinorrhea, sore throat, and any other symptoms.     In the emergency department, CBC showed a normocytic anemia with an H&H 13.9 and 41.2.  CMP showed hypokalemia at 2.9 and was otherwise normal without other electrolyte derangements.  , which is patient's baseline.  Lactic acid initially 3.0.  A1c 5.4.  UDS is negative.  Infectious disease screening including hepatitis panel, HIV, and syphilis all negative.  Urinalysis is clean.  Chest x-ray shows no acute cardiopulmonary process.  EKG shows atrial fibrillation with a rate of 101.  This has improved.  Given potassium in the ED.    Patient was admitted to internal medicine for treatment of hypokalemia and volume overload.  Vital signs are stable.  Currently saturating 100% on room air.  Patient is hypertensive.  Hydralazine is in.      24 hour interval history:  No acute events overnight. Last heart rate was 102 per chart.  Patient received all antihypertensive and rate control medications this morning.  Blood pressure has been elevated overnight.  Last systolic 143.  CBC and CMP are  within normal limits.  Magnesium is 1.7, currently being repleted with magnesium sulfate 1 g.  Repeat BMP pending.  Restarted home potassium.  Discontinue nifedipine.  Increase losartan to 100.  Echo shows an ejection fraction of 50-65%.  Atrial fibrillation rhythm observed.  Left atrium dilated.  Otherwise normal echo.      PMH: As stated above  Surgical HX:  Implanted cardiac device that terminates within the right atrium and right ventricle  Home meds:   Current Outpatient Medications   Medication Instructions    albuterol (PROVENTIL/VENTOLIN HFA) 90 mcg/actuation inhaler 2 puffs, Inhalation, Every 6 hours PRN, Rescue    aspirin 81 MG Chew chew and swallow 1 tablet by mouth daily    atorvastatin (LIPITOR) 40 mg, Oral, Daily    baclofen (LIORESAL) 10 mg, Oral, 3 times daily PRN    cetirizine (ZYRTEC) 10 mg, Oral, Daily    diltiaZEM (CARDIZEM CD) 360 mg, Oral, Daily    famotidine (PEPCID) 20 mg, Oral, 2 times daily    fluticasone propionate (FLONASE) 100 mcg, Each Nostril, Daily    GI cocktail antac/dicyc/lidoc 30 mLs, Swish & Swallow, Every 6 hours PRN    hyoscyamine (ANASPAZ,LEVSIN) 125 mcg, Oral, Every 6 hours PRN    LIDOcaine (LIDODERM) 5 % 1 patch, Transdermal, Daily, Remove & Discard patch within 12 hours or as directed by MD    losartan (COZAAR) 50 mg, Oral, Daily    methocarbamoL (ROBAXIN) 750 mg, Oral, 3 times daily    metoprolol succinate (TOPROL-XL) 200 mg, Oral, 2 times daily    NIFEdipine (PROCARDIA-XL) 30 mg, Oral, Daily    omeprazole (PRILOSEC) 20 mg, Oral, Daily    ondansetron (ZOFRAN-ODT) 4 mg, Oral, Every 12 hours PRN    potassium chloride SA (K-DUR,KLOR-CON) 20 MEQ tablet 40 mEq, Oral, Daily    promethazine (PHENERGAN) 25 mg, Oral, Every 6 hours PRN    spironolactone (ALDACTONE) 50 mg, Oral, Daily    traMADoL (ULTRAM) 50 mg, Oral, Every 6 hours PRN    vitamin D (VITAMIN D3) 1,000 Units, Oral, Daily    XARELTO 20 mg Tab TAKE 1 TABLET BY MOUTH DAILY with SUPPER      Review of Systems  "  Constitutional:  Negative for chills and fever.   HENT:  Negative for congestion.    Eyes:  Negative for redness.   Respiratory:  Negative for cough, sputum production, shortness of breath and wheezing.    Cardiovascular:  Positive for leg swelling. Negative for chest pain.   Gastrointestinal:  Negative for abdominal pain, diarrhea, nausea and vomiting.   Genitourinary:  Negative for dysuria and frequency.   Musculoskeletal:  Negative for back pain and myalgias.   Skin:  Negative for rash.   Neurological:  Negative for dizziness, focal weakness, weakness and headaches.          OBJECTIVE:     Vital signs:   BP (!) 143/98   Pulse 102   Temp 98.3 °F (36.8 °C) (Oral)   Resp 20   Ht 5' 10" (1.778 m)   Wt 106.1 kg (234 lb)   SpO2 100%   BMI 33.58 kg/m²      Physical Exam  Constitutional:       General: He is not in acute distress.     Appearance: Normal appearance. He is obese. He is not ill-appearing, toxic-appearing or diaphoretic.   HENT:      Head: Normocephalic and atraumatic.      Nose: Nose normal.      Mouth/Throat:      Mouth: Mucous membranes are moist.      Pharynx: Oropharynx is clear.   Eyes:      Conjunctiva/sclera: Conjunctivae normal.      Pupils: Pupils are equal, round, and reactive to light.   Cardiovascular:      Rate and Rhythm: Normal rate. Rhythm irregularly irregular.      Heart sounds: Heart sounds not distant. No murmur heard.     No friction rub. No gallop.   Pulmonary:      Effort: Pulmonary effort is normal. No respiratory distress.      Breath sounds: Normal breath sounds. No stridor. No wheezing or rhonchi.   Chest:      Comments: Pacemaker in place to the right chest  Abdominal:      General: Bowel sounds are normal. There is no distension.      Palpations: Abdomen is soft. There is no mass.      Tenderness: There is no abdominal tenderness. There is no guarding or rebound.      Hernia: No hernia is present.   Musculoskeletal:         General: No tenderness, deformity or signs of " injury. Normal range of motion.      Cervical back: Normal range of motion and neck supple.      Right lower le+ Pitting Edema present.      Left lower le+ Pitting Edema present.   Skin:     General: Skin is warm.      Capillary Refill: Capillary refill takes less than 2 seconds.      Coloration: Skin is not jaundiced or pale.   Neurological:      General: No focal deficit present.      Mental Status: He is alert and oriented to person, place, and time. Mental status is at baseline.      Cranial Nerves: No cranial nerve deficit.      Sensory: No sensory deficit.      Motor: No weakness.      Coordination: Coordination normal.   Psychiatric:         Mood and Affect: Mood normal.         Thought Content: Thought content normal.          Laboratory:  Most Recent Data:  CBC:   Lab Results   Component Value Date    WBC 7.74 08/15/2023    HGB 14.1 08/15/2023    HCT 41.9 (L) 08/15/2023     08/15/2023    MCV 88.6 08/15/2023    RDW 14.6 08/15/2023     WBC Differential:   Recent Labs   Lab 23  0658 23  0147 23  0846 08/15/23  0333   WBC 7.81 6.16 5.41 7.74   HGB 14.2 13.6* 13.9* 14.1   HCT 42.9 40.4* 41.2* 41.9*    211 223 227   MCV 89.6 88.2 89.4 88.6       BMP:   Lab Results   Component Value Date     08/15/2023    K 3.6 08/15/2023    CO2 23 08/15/2023    BUN 7.6 (L) 08/15/2023    CREATININE 0.87 08/15/2023    CALCIUM 8.8 08/15/2023    MG 1.70 08/15/2023    PHOS 3.2 08/15/2023     LFTs:   Lab Results   Component Value Date    ALBUMIN 3.4 08/15/2023    BILITOT 1.3 08/15/2023    AST 24 08/15/2023    ALKPHOS 54 08/15/2023    ALT 18 08/15/2023     Coags:   Lab Results   Component Value Date    INR 1.0 2023    PROTIME 13.3 2023    PTT 29.1 2023     FLP:   Lab Results   Component Value Date    CHOL 127 2023    HDL 31 (L) 2023    TRIG 103 2023     DM:   Lab Results   Component Value Date    HGBA1C 5.4 2023    HGBA1C 6.1 2021    HGBA1C 6.5  07/21/2020    CREATININE 0.87 08/15/2023     Thyroid:   Lab Results   Component Value Date    TSH 2.901 08/14/2023     Anemia:   Lab Results   Component Value Date    IRON 66 08/14/2023    TIBC 340 08/14/2023    FERRITIN 81.39 08/14/2023     Cardiac:   Lab Results   Component Value Date    TROPONINI 0.018 08/14/2023    .2 (H) 08/14/2023     Urinalysis:   Lab Results   Component Value Date    LABURIN No Significant Growth 05/23/2023    COLORU YELLOW 10/18/2021    PHUA 7.0 08/14/2023    NITRITE Negative 10/18/2021    KETONESU Negative 10/18/2021    UROBILINOGEN Normal 08/14/2023    WBCUA 0-5 08/14/2023       Imaging:   Imaging Results              X-Ray Chest AP Portable (Final result)  Result time 08/14/23 11:38:10      Final result by Sami Taylor MD (08/14/23 11:38:10)                   Impression:      NO ACUTE CARDIOPULMONARY PROCESS IDENTIFIED.      Electronically signed by: Sami Tayolr  Date:    08/14/2023  Time:    11:38               Narrative:    EXAMINATION:  XR CHEST AP PORTABLE    CLINICAL HISTORY:  Chest Pain;    TECHNIQUE:  One view    COMPARISON:  August 11, 2020.    FINDINGS:  Cardiopericardial silhouette enlarged appearance is similar.  Right chest implanted cardiac device electrodes terminate within the right atrium and the right ventricle.  No acute dense focal or segmental consolidation, congestive process, pleural effusions or pneumothorax.                                        ASSESSMENT & PLAN:     Heart failure with reduced ejection fraction EF of 50%  Coronary artery disease  Hyperlipidemia  -BNP is 329, baseline  -troponin negative  -continuing spironolactone 50 mg daily, nifedipine 30 mg daily, diltiazem 360 mg daily, Toprol 200 mg b.i.d.; increase losartan to 100 mg daily from 50  -continue atorvastatin 40 mg daily  -echo shows EF of 55-60% with atrial fibrillation rhythm and dilated left atrium, otherwise normal  -diet heart healthy  -not overloaded on exam, 2+ pitting edema,  chest x-ray clear  -will monitor    Hypokalemia  -given 40 mEq in the emergency department  -repeat potassium 2.9 a few hours afterwards  -gave oral 40 mEq and 40 mEq potassium chloride afterwards  -restarted home potassium chloride 40 mEq  -holding all loop and thiazide diuretics at this time  -BMP ordered for later today  -EKG shows atrial fibrillation with heart rate of 101,     Atrial fibrillation  -continue rivaroxaban 20 mg daily  -currently rate controlled, last pulse at 102, received all home medications this morning  -will monitor    Hypertension  -continue all antihypertensives as described above  -hydralazine 10 mg q.4 p.r.n. for SBP greater than 160 and DBP greater than 110  -will monitor    Lactic acidosis resolved  -bolused 500 cc with improvement  -avoiding more volume resuscitation due to CHF  -will trend and monitor    DVT PPx:  Rivaroxaban  GI PPx:  None  Diet:  Heart healthy  ABX:  None  PRNs:  Hydralazine  O2: Room air  PCP: Magdi Rivera DO    Disposition (08/15/2023):  Will likely keep 1 more day.  Repeat BMP pending.  Echo shows EF of 55-60%, with atrial fibrillation rhythm, and dilated left atrium.  Altered antihypertensive regimen as explained above.  Will monitor.    Emery Harding MD  \[facility] [place] [facility]\"" Internal Medicine, PGY-II

## 2023-08-15 NOTE — PT/OT/SLP EVAL
Physical Therapy Evaluation    Patient Name:  Delio Daniel Jr.   MRN:  [id number]    Recommendations:     Discharge Recommendations:  home with home health   Discharge Equipment Recommendations: bath bench (TBD - pending progress - quad cane (neighbors) vs rollator walker)     *current quad cane being utilized by patient  - unsafe and in poor shape - tape over rubber tips, metal of cane base visible on bottom of all 4 tips    Equipment to be obtained for discharge:  bath bench (TBD - pending progress - quad cane (neighbors) vs rollator walker)  .  Barriers to discharge: severity of deficits and decreased endurance    Assessment:     Delio Daniel Jr. is a 68 y.o. male admitted with a medical diagnosis of <principal problem not specified>.    Heart failure with reduced ejection fraction EF of 50%  Coronary artery disease  Hyperlipidemia  Hypokalemia  Atrial fibrillation  Hypertension  Lactic acidosis resolved  Patient Active Problem List   Diagnosis    Neuroendocrine carcinoma of small bowel    Nodule of left lung    Longstanding persistent atrial fibrillation    Hypertension    Hyperlipidemia LDL goal <70    Hypokalemia    Coronary artery disease involving native heart without angina pectoris    Second degree AV block    Cardiac arrest with ventricular fibrillation    Cardiac pacemaker in situ    History of deep vein thrombosis (DVT) of lower extremity    Current use of long term anticoagulation    Hypertensive urgency     He presents with the following impairments/functional limitations:  impaired endurance, gait instability, impaired balance, pain.    Rehab Prognosis: Good.    Patient would benefit from continued skilled acute PT services to: address above listed impairments/functional limitations; receive patient/caregiver education; reduce fall risk; and maximize independency/safety with functional mobility.    -continued: up-to-chair, ambulation, with progression of gait distance/frequency/duration and speed, as  tolerated/appropriate, with assistance and supervision    Recent Surgery: * No surgery found *      Plan:     During this hospitalization, patient to be seen 3 x/week to address the identified impairments/functional limitations via gait training, therapeutic activities, therapeutic exercises and progress toward the established goals.    Plan of Care Expires:  09/12/23    Subjective     Communicated with patient's nurse Farooq prior to session.    Patient agreeable to participate in evaluation.     Chief Complaint: pain in distal LE's and then pain complaints in low back  Patient/Family Comments/goals: home  Pain/Comfort:  Pain Rating 1: 9/10  Location - Side 1: Bilateral  Location - Orientation 1: distal  Location 1: leg  Pain Addressed 1: Nurse notified  Pain Rating Post-Intervention 1: 9/10  Pain Rating 2: 7/10  Location - Orientation 2: lower  Location 2: back  Pain Addressed 2: Nurse notified  Pain Rating Post-Intervention 2: 7/10    Patients cultural, spiritual, Rastafarian conflicts given the current situation: no    Social History  Living Environment: Patient lives with their nephew and brother in a single level home, with 3 steps steps outside, with right handrail, with tub.  Functional Level: Prior to admission patient was retired, was a passenger, was independent in ADL's, ambulated with assistive device, and required assistance with IADL's including transportation and household chores.  Equipment Used at Home: cane, quad (unsafe and in poor shape - tape over rubber tips, metal of cane base visible on bottom of all 4 tips)  Equipment owned (not currently used): none.  Assistance Upon Discharge:  brother and nephew .    Objective:     SUZANNE Garduno in room for evaluation    Patient found supine in bed, with HOB elevated, and bed rails up bilateral HOB with telemetry, peripheral IV (pacemaker)  upon PT entry to room.    General Precautions: Standard, fall, pacemaker   Orthopedic Precautions:N/A   Braces:  N/A  Respiratory Status: room air    Vitals   At Rest (pre-session)  BP  122/79   HR  69   O2 Sat %  99      With Activity (post-session)  BP  136/92   HR  70   O2 Sat %  99     Exams:  Orientation: Patient is oriented to Person, Place, Time, Situation  Commands: Patient follows multi-step verbal commands  Fine Motor Coordination:     -     Intact: LLE heel shin, RLE heel shin, and Rapid alternating ankle DF/PF  Sensation:    -     Intact: light/touch bilat lower extremity  BILAT UE ROM/strength - defer to OT - see OT note for details  RLE ROM: WFL  RLE Strength: WFL  LLE ROM: WFL  LLE Strength: WFL    Functional Mobility:    Bed Mobility:  Rolling Right: modified independence  Scooting: modified independence  Supine to Sit: modified independence  with no cues required    Transfers:  Sit to Stand: supervision with quad cane  with no cues required    Gait:  Patient ambulated 65ft with rolling walker and supervision.  Patient demonstrates no loss of balance, no mis-steps, decreased jaswinder, decreased step length, and wide base of support.    Other Mobility:  not assessed    Balance:  Sit  Patient demonstrated static balance on level surface with independence with no verbal cues.  Patient demonstrated dynamic balance on level surface with independence with no verbal cues during moderate excursions.  Stand  Patient demonstrated static balance on level surface  using rolling walker with modified independence with no verbal cues.    Patient left sitting in chair with telemetry, peripheral IV (pacemaker), all lines intact, call button in reach, tray table at bedside, and pt's nurse Farooq notified.    Education     Patient was instructed to utilize staff assistance for mobility/transfers.  White board updated regarding patient's safest level of mobility with staff assistance.    Goals     Multidisciplinary Problems       Physical Therapy Goals          Problem: Physical Therapy    Goal Priority Disciplines Outcome Goal  Variances Interventions   Physical Therapy Goal     PT, PT/OT      Description: Goals to be met by: DISCHARGE     Patient will increase functional independence with mobility by performing:    -. Supine to sit with [place]  -. Sit to supine with [place]  -. Rolling to Left and Right with [place]  --. Sit to stand transfer with Modified [place]  -. Gait  x 130 feet with Modified [place] using Rolling Walker  -. Ascend/descend 3 steps with right Handrails Supervision using No Assistive Device                     History:     Past Medical History:   Diagnosis Date    Arthritis     Atrial fibrillation     BPH (benign prostatic hyperplasia)     Cardiac arrest     Coronary artery disease     Cyst, kidney, acquired     Diverticulosis     Hyperlipidemia     Hypertension     MI (myocardial infarction)     Obesity     Steatosis of liver      Past Surgical History:   Procedure Laterality Date    A-V CARDIAC PACEMAKER INSERTION Right     CARDIAC CATHETERIZATION      COLONOSCOPY W/ BIOPSIES      excision of colon       Time Tracking:     PT Received On: 08/15/23  PT Start Time: 1347     PT Stop Time: 1424  PT Total Time (min): 37 min     Billable Minutes: Evaluation 37    08/15/2023

## 2023-08-15 NOTE — H&P
[facility] INTERNAL MEDICINE  ADMISSION HISTORY AND PHYSICAL    Resident Team: [facility] Medicine List 3  Attending Physician: Jesus Daniel MD    Date of Admit: 8/14/2023    SUBJECTIVE:      HPI: Delio Daniel Jr. is a 68 y.o. male with PMH of atrial fibrillation on long-term anticoagulation, history of cardiac arrest and MI, HFrEF with EF of 50% via echo 02/09/2023, pacemaker in place, moderate to severe mitral regurgitation, moderate to severe left atrial enlargement, coronary artery disease, hyperlipidemia, hypertension, and hepatic steatosis presented to the ED with a CC of bilateral leg swelling for the last week.  Reports compliance with all medications.  Patient is on diuretics at home.  No other associated symptoms.  Patient denies shortness of breath, chest pain, numbness, weakness, tingling, jaw pain, abdominal pain, dysuria, hematuria, diaphoresis, rash, cough, congestion, rhinorrhea, sore throat, and any other symptoms.     In the emergency department, CBC showed a normocytic anemia with an H&H 13.9 and 41.2.  CMP showed hypokalemia at 2.9 and was otherwise normal without other electrolyte derangements.  , which is patient's baseline.  Lactic acid initially 3.0.  A1c 5.4.  UDS is negative.  Infectious disease screening including hepatitis panel, HIV, and syphilis all negative.  Urinalysis is clean.  Chest x-ray shows no acute cardiopulmonary process.  EKG shows atrial fibrillation with a rate of 101.  This has improved.  Given potassium in the ED.    Patient was admitted to internal medicine for treatment of hypokalemia and volume overload.  Vital signs are stable.  Currently saturating 100% on room air.  Patient is hypertensive.  Hydralazine is in.      PMH: As stated above  Surgical HX:  Implanted cardiac device that terminates within the right atrium and right ventricle  Home meds:   Current Outpatient Medications   Medication Instructions    albuterol (PROVENTIL/VENTOLIN HFA) 90 mcg/actuation inhaler 2  puffs, Inhalation, Every 6 hours PRN, Rescue    aspirin 81 MG Chew chew and swallow 1 tablet by mouth daily    atorvastatin (LIPITOR) 40 mg, Oral, Daily    baclofen (LIORESAL) 10 mg, Oral, 3 times daily PRN    cetirizine (ZYRTEC) 10 mg, Oral, Daily    diltiaZEM (CARDIZEM CD) 360 mg, Oral, Daily    famotidine (PEPCID) 20 mg, Oral, 2 times daily    fluticasone propionate (FLONASE) 100 mcg, Each Nostril, Daily    GI cocktail antac/dicyc/lidoc 30 mLs, Swish & Swallow, Every 6 hours PRN    hyoscyamine (ANASPAZ,LEVSIN) 125 mcg, Oral, Every 6 hours PRN    LIDOcaine (LIDODERM) 5 % 1 patch, Transdermal, Daily, Remove & Discard patch within 12 hours or as directed by MD    losartan (COZAAR) 50 mg, Oral, Daily    methocarbamoL (ROBAXIN) 750 mg, Oral, 3 times daily    metoprolol succinate (TOPROL-XL) 200 mg, Oral, 2 times daily    NIFEdipine (PROCARDIA-XL) 30 mg, Oral, Daily    omeprazole (PRILOSEC) 20 mg, Oral, Daily    ondansetron (ZOFRAN-ODT) 4 mg, Oral, Every 12 hours PRN    potassium chloride SA (K-DUR,KLOR-CON) 20 MEQ tablet 40 mEq, Oral, Daily    promethazine (PHENERGAN) 25 mg, Oral, Every 6 hours PRN    spironolactone (ALDACTONE) 50 mg, Oral, Daily    traMADoL (ULTRAM) 50 mg, Oral, Every 6 hours PRN    vitamin D (VITAMIN D3) 1,000 Units, Oral, Daily    XARELTO 20 mg Tab TAKE 1 TABLET BY MOUTH DAILY with SUPPER      Review of Systems   Constitutional:  Negative for chills and fever.   HENT:  Negative for congestion.    Eyes:  Negative for redness.   Respiratory:  Negative for cough, sputum production, shortness of breath and wheezing.    Cardiovascular:  Positive for leg swelling. Negative for chest pain.   Gastrointestinal:  Negative for abdominal pain, diarrhea, nausea and vomiting.   Genitourinary:  Negative for dysuria and frequency.   Musculoskeletal:  Negative for back pain and myalgias.   Skin:  Negative for rash.   Neurological:  Negative for dizziness, focal weakness, weakness and headaches.          OBJECTIVE:      Vital signs:   BP (!) 149/86   Pulse 101   Temp 98.2 °F (36.8 °C) (Temporal)   Resp (!) 21   Wt 107.5 kg (237 lb)   SpO2 98%   BMI 35.00 kg/m²      Physical Exam  Constitutional:       General: He is not in acute distress.     Appearance: Normal appearance. He is obese. He is not ill-appearing, toxic-appearing or diaphoretic.   HENT:      Head: Normocephalic and atraumatic.      Nose: Nose normal.      Mouth/Throat:      Mouth: Mucous membranes are moist.      Pharynx: Oropharynx is clear.   Eyes:      Conjunctiva/sclera: Conjunctivae normal.      Pupils: Pupils are equal, round, and reactive to light.   Cardiovascular:      Rate and Rhythm: Normal rate. Rhythm irregularly irregular.      Heart sounds: Heart sounds not distant. No murmur heard.     No friction rub. No gallop.   Pulmonary:      Effort: Pulmonary effort is normal. No respiratory distress.      Breath sounds: Normal breath sounds. No stridor. No wheezing or rhonchi.   Chest:      Comments: Pacemaker in place to the right chest  Abdominal:      General: Bowel sounds are normal. There is no distension.      Palpations: Abdomen is soft. There is no mass.      Tenderness: There is no abdominal tenderness. There is no guarding or rebound.      Hernia: No hernia is present.   Musculoskeletal:         General: No tenderness, deformity or signs of injury. Normal range of motion.      Cervical back: Normal range of motion and neck supple.      Right lower le+ Pitting Edema present.      Left lower le+ Pitting Edema present.   Skin:     General: Skin is warm.      Capillary Refill: Capillary refill takes less than 2 seconds.      Coloration: Skin is not jaundiced or pale.   Neurological:      General: No focal deficit present.      Mental Status: He is alert and oriented to person, place, and time. Mental status is at baseline.      Cranial Nerves: No cranial nerve deficit.      Sensory: No sensory deficit.      Motor: No weakness.       Coordination: Coordination normal.   Psychiatric:         Mood and Affect: Mood normal.         Thought Content: Thought content normal.          Laboratory:  Most Recent Data:  CBC:   Lab Results   Component Value Date    WBC 5.41 08/14/2023    HGB 13.9 (L) 08/14/2023    HCT 41.2 (L) 08/14/2023     08/14/2023    MCV 89.4 08/14/2023    RDW 14.7 08/14/2023     WBC Differential:   Recent Labs   Lab 08/09/23  0658 08/11/23  0147 08/14/23  0846   WBC 7.81 6.16 5.41   HGB 14.2 13.6* 13.9*   HCT 42.9 40.4* 41.2*    211 223   MCV 89.6 88.2 89.4     BMP:   Lab Results   Component Value Date     08/14/2023    K 2.9 (L) 08/14/2023    CO2 25 08/14/2023    BUN 10.5 08/14/2023    CREATININE 1.09 08/14/2023    CALCIUM 9.1 08/14/2023    MG 1.90 08/14/2023    PHOS 3.3 08/14/2023     LFTs:   Lab Results   Component Value Date    ALBUMIN 3.8 08/14/2023    BILITOT 1.2 08/14/2023    AST 21 08/14/2023    ALKPHOS 59 08/14/2023    ALT 17 08/14/2023     Coags:   Lab Results   Component Value Date    INR 1.0 08/14/2023    PROTIME 13.3 08/14/2023    PTT 29.1 08/14/2023     FLP:   Lab Results   Component Value Date    CHOL 61 07/03/2021    HDL 18 (L) 07/03/2021    TRIG 110 07/03/2021     DM:   Lab Results   Component Value Date    HGBA1C 5.4 08/14/2023    HGBA1C 6.1 07/02/2021    HGBA1C 6.5 07/21/2020    CREATININE 1.09 08/14/2023     Thyroid:   Lab Results   Component Value Date    TSH 2.901 08/14/2023     Anemia:   Lab Results   Component Value Date    IRON 66 08/14/2023    TIBC 340 08/14/2023    FERRITIN 81.39 08/14/2023     Cardiac:   Lab Results   Component Value Date    TROPONINI 0.018 08/14/2023    .2 (H) 08/14/2023     Urinalysis:   Lab Results   Component Value Date    LABURIN No Significant Growth 05/23/2023    COLORU YELLOW 10/18/2021    PHUA 6.0 08/11/2023    NITRITE Negative 10/18/2021    KETONESU Negative 10/18/2021    UROBILINOGEN Normal 08/11/2023    WBCUA 0-5 08/11/2023       Imaging:   Imaging  Results              X-Ray Chest AP Portable (Final result)  Result time 08/14/23 11:38:10      Final result by Sami Taylor MD (08/14/23 11:38:10)                   Impression:      NO ACUTE CARDIOPULMONARY PROCESS IDENTIFIED.      Electronically signed by: Sami Taylor  Date:    08/14/2023  Time:    11:38               Narrative:    EXAMINATION:  XR CHEST AP PORTABLE    CLINICAL HISTORY:  Chest Pain;    TECHNIQUE:  One view    COMPARISON:  August 11, 2020.    FINDINGS:  Cardiopericardial silhouette enlarged appearance is similar.  Right chest implanted cardiac device electrodes terminate within the right atrium and the right ventricle.  No acute dense focal or segmental consolidation, congestive process, pleural effusions or pneumothorax.                                        ASSESSMENT & PLAN:     Heart failure with reduced ejection fraction EF of 50%  Coronary artery disease  Hyperlipidemia  -BNP is 329, baseline  -troponin negative  -continuing spironolactone 50 mg daily, nifedipine 30 mg daily, losartan 50 mg daily, diltiazem 360 mg daily, Toprol 200 mg b.i.d.  -continue atorvastatin 40 mg daily  -echo ordered and pending  -diet heart healthy  -not overloaded on exam, 2+ pitting edema, chest x-ray clear  -will monitor    Hypokalemia  -given 40 mEq in the emergency department  -repeat potassium 2.9 a few hours afterwards  -gave oral 40 mEq and 40 mEq potassium chloride  -holding all loop and thiazide diuretics at this time  -will trend  -EKG shows atrial fibrillation with heart rate of 101,     Atrial fibrillation  -continue rivaroxaban 20 mg daily  -currently rate controlled  -will monitor    Hypertension  -continue all antihypertensives as described above  -hydralazine 10 mg q.4 p.r.n. for SBP greater than 160 and DBP greater than 110  -will monitor    Lactic acidosis  -bolused 500 cc with improvement  -avoiding more volume resuscitation due to CHF  -will trend and monitor    DVT PPx:   Rivaroxaban  GI PPx:  None  Diet:  Heart healthy  ABX:  None  PRNs:  Hydralazine  O2: Room air  PCP: Magdi Rivera DO    Disposition (08/14/2023):  Likely to be discharged home today.  Echo pending.  Will trend potassium and replete as needed.    Emery Harding MD  [employer] [facility], PGY-II

## 2023-08-15 NOTE — PROGRESS NOTES
"Inpatient Nutrition Evaluation    Admit Date: 2023   Total duration of encounter: 1 day    Nutrition Recommendation/Prescription     Continue cardiac diet  Pt education on diet/complete  MVI/fe  Biweekly wt  Will monitor nutrition status     Nutrition Assessment     Chart Review    Reason Seen: continuous nutrition monitoring    Malnutrition Screening Tool Results   Have you recently lost weight without trying?: No  Have you been eating poorly because of a decreased appetite?: No   MST Score: 0     Diagnosis:  CHF, CAD, HLD, hypokalemia, afib, HTN, lactic acidosis     Relevant Medical History: afib, cardiac arrest, MI, CHF, pacemaker, mitral regurgitation, CAD, HLD, HTN, hepatic steatosis     Nutrition-Related Medications: afib, cardiac arrest/MI, CHF, pacemaker, mitral regurgitation, CAD, HLD, HTN, hepatic steatosis     Nutrition-Related Labs:  (8-15) H/H 14.1/41.9 Gluc 87 Bun 7.6 Cr 0.8 K 3.6     Diet Order: Diet heart healthy  Oral Supplement Order: none  Appetite/Oral Intake: good/% of meals  Factors Affecting Nutritional Intake: none identified  Food/Christian/Cultural Preferences: none reported  Food Allergies: none reported       Wound(s):   none    Comments    (8/15) Pt reported he is tolerating oral diet; good appetite; wt elevated/ fluid; + edema/on diuretic; current diet tx appropriate.       Anthropometrics    Height: 5' 10" (177.8 cm)    Last Weight: 106.1 kg (234 lb) (08/15/23 0843) Weight Method: Bed Scale  BMI (Calculated): 33.6  BMI Classification: obese grade I (BMI 30-34.9)        Ideal Body Weight (IBW), Male: 166 lb     % Ideal Body Weight, Male (lb): 140.96 %                 Usual Body Weight (UBW), k.2 kg  % Usual Body Weight: 100.15     Usual Weight Provided By: patient and EMR weight history    Wt Readings from Last 5 Encounters:   08/15/23 106.1 kg (234 lb)   23 107.5 kg (237 lb)   23 109.8 kg (242 lb 1 oz)   23 110 kg (242 lb 9.6 oz)   23 108.4 kg " (239 lb)     Weight Change(s) Since Admission:  Admit Weight: 107.5 kg (237 lb) (08/14/23 0808)  No wt loss / wt fluctuates fluid     Patient Education    Education Provided: heart healthy diet  Teaching Method: explanation  Comprehension: verbalizes understanding  Barriers to Learning: none evident  Expected Compliance: fair  Comments: All questions were answered and dietitian's contact information was provided.     Monitoring & Evaluation     Dietitian will monitor food and beverage intake, weight, food/nutrition knowledge skill, and glucose/endocrine profile.  Nutrition Risk/Follow-Up: low (follow-up in 5-7 days)  Patients assigned 'low nutrition risk' status do not qualify for a full nutritional assessment but will be monitored and re-evaluated in a 5-7 day time period. Please consult if re-evaluation needed sooner.

## 2023-08-16 VITALS
HEIGHT: 70 IN | RESPIRATION RATE: 20 BRPM | DIASTOLIC BLOOD PRESSURE: 98 MMHG | SYSTOLIC BLOOD PRESSURE: 152 MMHG | WEIGHT: 235 LBS | TEMPERATURE: 98 F | BODY MASS INDEX: 33.64 KG/M2 | OXYGEN SATURATION: 99 % | HEART RATE: 112 BPM

## 2023-08-16 PROBLEM — R60.0 BILATERAL LOWER EXTREMITY EDEMA: Status: ACTIVE | Noted: 2023-08-16

## 2023-08-16 LAB
ALBUMIN SERPL-MCNC: 3.2 G/DL (ref 3.4–4.8)
ALBUMIN/GLOB SERPL: 1 RATIO (ref 1.1–2)
ALP SERPL-CCNC: 55 UNIT/L (ref 40–150)
ALT SERPL-CCNC: 18 UNIT/L (ref 0–55)
AST SERPL-CCNC: 23 UNIT/L (ref 5–34)
BASOPHILS # BLD AUTO: 0.06 X10(3)/MCL
BASOPHILS NFR BLD AUTO: 0.9 %
BILIRUB SERPL-MCNC: 1.1 MG/DL
BUN SERPL-MCNC: 12 MG/DL (ref 8.4–25.7)
CALCIUM SERPL-MCNC: 9 MG/DL (ref 8.8–10)
CHLORIDE SERPL-SCNC: 107 MMOL/L (ref 98–107)
CO2 SERPL-SCNC: 23 MMOL/L (ref 23–31)
CREAT SERPL-MCNC: 1.13 MG/DL (ref 0.73–1.18)
EOSINOPHIL # BLD AUTO: 0.24 X10(3)/MCL (ref 0–0.9)
EOSINOPHIL NFR BLD AUTO: 3.7 %
ERYTHROCYTE [DISTWIDTH] IN BLOOD BY AUTOMATED COUNT: 14.6 % (ref 11.5–17)
GFR SERPLBLD CREATININE-BSD FMLA CKD-EPI: >60 MLS/MIN/1.73/M2
GLOBULIN SER-MCNC: 3.3 GM/DL (ref 2.4–3.5)
GLUCOSE SERPL-MCNC: 120 MG/DL (ref 82–115)
HCT VFR BLD AUTO: 42.8 % (ref 42–52)
HGB BLD-MCNC: 14 G/DL (ref 14–18)
IMM GRANULOCYTES # BLD AUTO: 0.05 X10(3)/MCL (ref 0–0.04)
IMM GRANULOCYTES NFR BLD AUTO: 0.8 %
INR PPP: 1.9
INR PPP: 2.1
LYMPHOCYTES # BLD AUTO: 1.94 X10(3)/MCL (ref 0.6–4.6)
LYMPHOCYTES NFR BLD AUTO: 29.6 %
MAGNESIUM SERPL-MCNC: 2 MG/DL (ref 1.6–2.6)
MCH RBC QN AUTO: 29.4 PG (ref 27–31)
MCHC RBC AUTO-ENTMCNC: 32.7 G/DL (ref 33–36)
MCV RBC AUTO: 89.9 FL (ref 80–94)
MONOCYTES # BLD AUTO: 0.79 X10(3)/MCL (ref 0.1–1.3)
MONOCYTES NFR BLD AUTO: 12 %
NEUTROPHILS # BLD AUTO: 3.48 X10(3)/MCL (ref 2.1–9.2)
NEUTROPHILS NFR BLD AUTO: 53 %
NRBC BLD AUTO-RTO: 0 %
PATH REV: NORMAL
PHOSPHATE SERPL-MCNC: 3.3 MG/DL (ref 2.3–4.7)
PLATELET # BLD AUTO: 229 X10(3)/MCL (ref 130–400)
PMV BLD AUTO: 10.1 FL (ref 7.4–10.4)
POTASSIUM SERPL-SCNC: 3.1 MMOL/L (ref 3.5–5.1)
PROT SERPL-MCNC: 6.5 GM/DL (ref 5.8–7.6)
PROTHROMBIN TIME: 21.5 SECONDS (ref 11.4–14)
PROTHROMBIN TIME: 22.8 SECONDS (ref 11.4–14)
RBC # BLD AUTO: 4.76 X10(6)/MCL (ref 4.7–6.1)
SODIUM SERPL-SCNC: 140 MMOL/L (ref 136–145)
WBC # SPEC AUTO: 6.56 X10(3)/MCL (ref 4.5–11.5)

## 2023-08-16 PROCEDURE — 97116 GAIT TRAINING THERAPY: CPT

## 2023-08-16 PROCEDURE — 25000003 PHARM REV CODE 250

## 2023-08-16 PROCEDURE — 84100 ASSAY OF PHOSPHORUS: CPT

## 2023-08-16 PROCEDURE — 94761 N-INVAS EAR/PLS OXIMETRY MLT: CPT

## 2023-08-16 PROCEDURE — 83735 ASSAY OF MAGNESIUM: CPT

## 2023-08-16 PROCEDURE — 80053 COMPREHEN METABOLIC PANEL: CPT

## 2023-08-16 PROCEDURE — 25000003 PHARM REV CODE 250: Performed by: INTERNAL MEDICINE

## 2023-08-16 PROCEDURE — 99900035 HC TECH TIME PER 15 MIN (STAT)

## 2023-08-16 PROCEDURE — G0378 HOSPITAL OBSERVATION PER HR: HCPCS

## 2023-08-16 PROCEDURE — 85025 COMPLETE CBC W/AUTO DIFF WBC: CPT

## 2023-08-16 RX ORDER — POTASSIUM CHLORIDE 20 MEQ/1
40 TABLET, EXTENDED RELEASE ORAL ONCE
Status: COMPLETED | OUTPATIENT
Start: 2023-08-16 | End: 2023-08-16

## 2023-08-16 RX ADMIN — SPIRONOLACTONE 50 MG: 25 TABLET ORAL at 10:08

## 2023-08-16 RX ADMIN — ATORVASTATIN CALCIUM 40 MG: 40 TABLET, FILM COATED ORAL at 10:08

## 2023-08-16 RX ADMIN — LOSARTAN POTASSIUM 100 MG: 25 TABLET, FILM COATED ORAL at 10:08

## 2023-08-16 RX ADMIN — DILTIAZEM HYDROCHLORIDE 360 MG: 180 CAPSULE, COATED, EXTENDED RELEASE ORAL at 10:08

## 2023-08-16 RX ADMIN — POTASSIUM CHLORIDE 40 MEQ: 1500 TABLET, EXTENDED RELEASE ORAL at 06:08

## 2023-08-16 RX ADMIN — ASPIRIN 81 MG: 81 TABLET, CHEWABLE ORAL at 10:08

## 2023-08-16 RX ADMIN — METOPROLOL SUCCINATE 200 MG: 50 TABLET, EXTENDED RELEASE ORAL at 10:08

## 2023-08-16 NOTE — PT/OT/SLP PROGRESS
Physical Therapy Treatment    Patient Name:  Delio Daniel Jr.   MRN:  [id number]    Recommendations     Discharge Recommendations:  home with home health   Discharge Equipment Recommendations: bath bench, walker, rolling (pt's quad cane is borrowed and rubber tips worn out w/ metal of cane visable on bottom)     *current quad cane being utilized by patient  - unsafe and in poor shape - tape over rubber tips, metal of cane base visible on bottom of all 4 tips    Barriers to discharge: severity of deficits and decreased endurance    Assessment     Delio Daniel Jr. is a 68 y.o. male admitted with a medical diagnosis of    Heart failure with reduced ejection fraction EF of 50%  Coronary artery disease  Hyperlipidemia  Hypokalemia  Atrial fibrillation  Hypertension  Lactic acidosis resolved      Patient Active Problem List   Diagnosis    Neuroendocrine carcinoma of small bowel    Nodule of left lung    Longstanding persistent atrial fibrillation    Hypertension    Hyperlipidemia LDL goal <70    Hypokalemia    Coronary artery disease involving native heart without angina pectoris    Second degree AV block    Cardiac arrest with ventricular fibrillation    Cardiac pacemaker in situ    History of deep vein thrombosis (DVT) of lower extremity    Current use of long term anticoagulation    Hypertensive urgency     He presents with the following impairments/functional limitations:  gait instability, impaired endurance, impaired balance, pain.    Rehab Prognosis: Good.    Patient would benefit from continued skilled acute PT services to: address above listed impairments/functional limitations; receive patient/caregiver education; reduce fall risk; and maximize independency/safety with functional mobility.    -continued: up-to-chair, ambulation, with progression of gait distance/frequency/duration and speed, as tolerated/appropriate, with assistance and supervision    Recent Surgery: * No surgery found *      Plan     During  this hospitalization, patient to be seen 3 x/week to address the identified impairments/functional limitations via gait training, therapeutic activities, therapeutic exercises and progress toward the established goals.    Plan of Care Expires:  09/12/23    Subjective     Communicated with patient's nurse Farooq prior to session.    Patient agreeable to participate in treatment session.    Chief Complaint: pain  Patient/Family Comments/goals: home  Pain/Comfort:  Pain Rating 1: 9/10  Location - Orientation 1: lower  Location 1: back  Pain Addressed 1: Nurse notified  Pain Rating Post-Intervention 1: 9/10  Pain Rating 2: 5/10  Location - Side 2: Bilateral  Location - Orientation 2: distal  Location 2: leg  Pain Addressed 2: Nurse notified  Pain Rating Post-Intervention 2: 5/10    Objective     Patient found with HOB elevated and right sidelying with telemetry, peripheral IV (pacemaker)  upon PT entry to room.    General Precautions: Standard, fall, pacemaker   Orthopedic Precautions:N/A   Braces: N/A  Respiratory Status: room air    Functional Mobility:    Bed Mobility:  Rolling Right: independence  Scooting: modified independence  Supine to Sit: independence  with no cues required    Transfers:  Sit to Stand: modified independence with no assistive device  with no cues required    Gait:  Patient ambulated 60ft with quad cane (LBQC) and contact guard assistance.  Sitting rest x3 minutes  Patient ambulated 75ft with rollator and supervision.  Sitting rest x3 minutes  Patient ambulated 75ft with rolling walker and modified independence.  Patient demonstrates no loss of balance, no mis-steps, occasional unsteady gait, decreased step length, wide base of support, and flexed posture.    Increased assistance w/ quad cane and rollator vs. rolling walker  Patient declined use of large base quad cane provided by therapist and rollator  Per patient - I will fall w/ that cane (large base quad cane provided by therapist)  Per  patient - I prefer the rolling walker to the rollator  Per patient - I prefer the quad cane I've been using to all of the others    Other Mobility:  not assessed    Balance:  Sit  Patient demonstrated static balance on level surface with independence with no verbal cues.  Patient demonstrated dynamic balance on level surface with independence with no verbal cues during moderate excursions.  Stand  Patient demonstrated static balance on level surface using rolling walker with modified independence with no verbal cues.    Patient left sitting edge of bed with telemetry, peripheral IV (pacemaker), with all lines intact, call button in reach, tray table at bedside, pt's nurse Farooq notified and in room to give patient medications.    Goals     Multidisciplinary Problems       Physical Therapy Goals       Problem: Physical Therapy    Goal Priority Disciplines Outcome Goal Variances Interventions   Physical Therapy Goal     PT, PT/OT Ongoing, Progressing     Description: Goals to be met by: DISCHARGE     Patient will increase functional independence with mobility by performing:    -. Supine to sit with [place] - MET 08/16/2023  -. Sit to supine with [place] - ONGOING  -. Rolling to Left and Right with [place] - PARTIALLY MET (Rt) 08/16/2023  --. Sit to stand transfer with Modified [place] - MET 08/16/2023  -. Gait  x 130 feet with Modified [place] using Rolling Walker - ONGOING  -. Ascend/descend 3 steps with right Handrails Supervision using No Assistive Device - ONGOING           Time Tracking     PT Received On: 08/16/23  PT Start Time: 1014     PT Stop Time: 1039  PT Total Time (min): 25 min     Billable Minutes: Gait Training 25 08/16/2023

## 2023-08-16 NOTE — PROGRESS NOTES
[facility] INTERNAL MEDICINE  Progress note    Resident Team: [facility] Medicine List 3  Attending Physician: Jesus Daniel MD    Date of Admit: 8/14/2023    SUBJECTIVE:      HPI: Delio Daniel Jr. is a 68 y.o. male with PMH of atrial fibrillation on long-term anticoagulation, history of cardiac arrest and MI, HFrEF with EF of 50% via echo 02/09/2023, pacemaker in place, moderate to severe mitral regurgitation, moderate to severe left atrial enlargement, coronary artery disease, hyperlipidemia, hypertension, and hepatic steatosis presented to the ED with a CC of bilateral leg swelling for the last week.  Reports compliance with all medications.  Patient is on diuretics at home.  No other associated symptoms.  Patient denies shortness of breath, chest pain, numbness, weakness, tingling, jaw pain, abdominal pain, dysuria, hematuria, diaphoresis, rash, cough, congestion, rhinorrhea, sore throat, and any other symptoms.     In the emergency department, CBC showed a normocytic anemia with an H&H 13.9 and 41.2.  CMP showed hypokalemia at 2.9 and was otherwise normal without other electrolyte derangements.  , which is patient's baseline.  Lactic acid initially 3.0.  A1c 5.4.  UDS is negative.  Infectious disease screening including hepatitis panel, HIV, and syphilis all negative.  Urinalysis is clean.  Chest x-ray shows no acute cardiopulmonary process.  EKG shows atrial fibrillation with a rate of 101.  This has improved.  Given potassium in the ED.    Patient was admitted to internal medicine for treatment of hypokalemia and volume overload.  Vital signs are stable.  Currently saturating 100% on room air.  Patient is hypertensive.  Hydralazine is in.      24 hour interval history:  No acute events overnight.  Vitals stable.  No complaints this morning        Review of Systems   Constitutional:  Negative for chills and fever.   HENT:  Negative for congestion.    Eyes:  Negative for redness.   Respiratory:  Negative for  "cough, sputum production, shortness of breath and wheezing.    Cardiovascular:  Negative for chest pain and leg swelling.   Gastrointestinal:  Negative for abdominal pain, diarrhea, nausea and vomiting.   Genitourinary:  Negative for dysuria and frequency.   Musculoskeletal:  Negative for back pain and myalgias.   Skin:  Negative for rash.   Neurological:  Negative for dizziness, focal weakness, weakness and headaches.          OBJECTIVE:     Vital signs:   BP (!) 144/88   Pulse 76   Temp 98.3 °F (36.8 °C) (Oral)   Resp 20   Ht 5' 10" (1.778 m)   Wt 106.6 kg (235 lb 0.2 oz)   SpO2 100%   BMI 33.72 kg/m²      Physical Exam  Constitutional:       General: He is not in acute distress.     Appearance: Normal appearance. He is obese. He is not ill-appearing, toxic-appearing or diaphoretic.   HENT:      Head: Normocephalic and atraumatic.   Eyes:      Conjunctiva/sclera: Conjunctivae normal.      Pupils: Pupils are equal, round, and reactive to light.   Cardiovascular:      Rate and Rhythm: Normal rate. Rhythm irregularly irregular.      Heart sounds: Heart sounds not distant. No murmur heard.  Pulmonary:      Effort: Pulmonary effort is normal. No respiratory distress.      Breath sounds: Normal breath sounds. No stridor. No wheezing or rhonchi.   Chest:      Comments: Pacemaker in place to the right chest  Abdominal:      General: Bowel sounds are normal. There is no distension.      Palpations: Abdomen is soft. There is no mass.      Tenderness: There is no abdominal tenderness. There is no guarding or rebound.      Hernia: No hernia is present.   Musculoskeletal:         General: Normal range of motion.      Cervical back: Normal range of motion and neck supple.      Right lower leg: No edema.      Left lower leg: No edema.   Skin:     General: Skin is warm.      Coloration: Skin is not jaundiced or pale.   Neurological:      General: No focal deficit present.      Mental Status: He is alert and oriented to " person, place, and time. Mental status is at baseline.   Psychiatric:         Mood and Affect: Mood normal.         Thought Content: Thought content normal.          Laboratory:  Most Recent Data:  CBC:   Lab Results   Component Value Date    WBC 6.56 08/16/2023    HGB 14.0 08/16/2023    HCT 42.8 08/16/2023     08/16/2023    MCV 89.9 08/16/2023    RDW 14.6 08/16/2023     WBC Differential:   Recent Labs   Lab 08/09/23  0658 08/11/23  0147 08/14/23  0846 08/15/23  0333 08/16/23  0323   WBC 7.81 6.16 5.41 7.74 6.56   HGB 14.2 13.6* 13.9* 14.1 14.0   HCT 42.9 40.4* 41.2* 41.9* 42.8    211 223 227 229   MCV 89.6 88.2 89.4 88.6 89.9       BMP:   Lab Results   Component Value Date     08/16/2023    K 3.1 (L) 08/16/2023    CO2 23 08/16/2023    BUN 12.0 08/16/2023    CREATININE 1.13 08/16/2023    CALCIUM 9.0 08/16/2023    MG 2.00 08/16/2023    PHOS 3.3 08/16/2023     LFTs:   Lab Results   Component Value Date    ALBUMIN 3.2 (L) 08/16/2023    BILITOT 1.1 08/16/2023    AST 23 08/16/2023    ALKPHOS 55 08/16/2023    ALT 18 08/16/2023     Coags:   Lab Results   Component Value Date    INR 1.0 08/14/2023    PROTIME 13.3 08/14/2023    PTT 29.1 08/14/2023     FLP:   Lab Results   Component Value Date    CHOL 127 08/14/2023    HDL 31 (L) 08/14/2023    TRIG 103 08/14/2023     DM:   Lab Results   Component Value Date    HGBA1C 5.4 08/14/2023    HGBA1C 6.1 07/02/2021    HGBA1C 6.5 07/21/2020    CREATININE 1.13 08/16/2023     Thyroid:   Lab Results   Component Value Date    TSH 2.901 08/14/2023     Anemia:   Lab Results   Component Value Date    IRON 66 08/14/2023    TIBC 340 08/14/2023    FERRITIN 81.39 08/14/2023     Cardiac:   Lab Results   Component Value Date    TROPONINI 0.018 08/14/2023    .2 (H) 08/14/2023     Urinalysis:   Lab Results   Component Value Date    LABURIN No Significant Growth 05/23/2023    COLORU YELLOW 10/18/2021    PHUA 7.0 08/14/2023    NITRITE Negative 10/18/2021    KETONESU Negative  10/18/2021    UROBILINOGEN Normal 08/14/2023    WBCUA 0-5 08/14/2023       Imaging:   Imaging Results              X-Ray Chest AP Portable (Final result)  Result time 08/14/23 11:38:10      Final result by Sami Taylor MD (08/14/23 11:38:10)                   Impression:      NO ACUTE CARDIOPULMONARY PROCESS IDENTIFIED.      Electronically signed by: Sami Taylor  Date:    08/14/2023  Time:    11:38               Narrative:    EXAMINATION:  XR CHEST AP PORTABLE    CLINICAL HISTORY:  Chest Pain;    TECHNIQUE:  One view    COMPARISON:  August 11, 2020.    FINDINGS:  Cardiopericardial silhouette enlarged appearance is similar.  Right chest implanted cardiac device electrodes terminate within the right atrium and the right ventricle.  No acute dense focal or segmental consolidation, congestive process, pleural effusions or pneumothorax.                                        ASSESSMENT & PLAN:     Heart failure with reduced ejection fraction EF of 50%  Coronary artery disease  Hyperlipidemia  -continuing spironolactone 50 mg daily, nifedipine 30 mg daily, diltiazem 360 mg daily, Toprol 200 mg b.i.d.; increase losartan to 100 mg daily from 50  -continue atorvastatin 40 mg daily  -echo shows EF of 55-60% with atrial fibrillation rhythm and dilated left atrium, otherwise normal  -this is my clinic patient on last visit we assessed all his medications.  He had several bags of medications with several of the same medications and different doses.  A lot of these were discontinued including nifedipine with him being on diltiazem for AFib.  But apparently he was using nifedipine will diltiazem up until admission.    Hypokalemia  -40 KCL this morning  -still holding loop and thiazide    Atrial fibrillation  -continue rivaroxaban 20 mg daily  -rate controlled    Hypertension  -continue all antihypertensives as described above  -hydralazine 10 mg q.4 p.r.n. for SBP greater than 160 and DBP greater than 110  -will  monitor    Lactic acidosis resolved      DVT PPx:  Rivaroxaban  GI PPx:  None  Diet:  Heart healthy  ABX:  None  PRNs:  Hydralazine  O2: Room air  PCP: Magdi Rivera DO    Discharge today    Magdi Rivera DO  Internal Medicine - PGY-3

## 2023-08-16 NOTE — CONSULTS
Patient was seen at bedside after a consult for dystrophic toe nails. Pt was seen by me in the past for the same. Unfortunately as a nondiabetic I cannot schedule regular foot care in our clinic. Pt is told to ask for me any time he is inpatient. Pt is noted with severe Onychomycosis causing his thick discolored nails to curl onto themselves. Luckily no wounds were noted beneath nails. Nails were trimmed to shortest possible length with stainless steel nail nippers. Pt tolerated well and was very grateful. Nursing updated. No need for wound care to follow.

## 2023-08-16 NOTE — DISCHARGE SUMMARY
[facility]    Admitting Physician: Jesus Daniel MD  Attending Physician: Jesus Daniel MD  Date of Admit: 8/14/2023  Date of Discharge: 8/16/2023    Condition: Good  Outcome: Condition has improved and patient is now ready for discharge.  DISPOSITION: Home or Self Care    Discharge Diagnoses     Patient Active Problem List   Diagnosis    Neuroendocrine carcinoma of small bowel    Nodule of left lung    Longstanding persistent atrial fibrillation    Hypertension    Hyperlipidemia LDL goal <70    Hypokalemia    Coronary artery disease involving native heart without angina pectoris    Second degree AV block    Cardiac arrest with ventricular fibrillation    Cardiac pacemaker in situ    History of deep vein thrombosis (DVT) of lower extremity    Current use of long term anticoagulation    Hypertensive urgency    Bilateral lower extremity edema       Principal Problem:  Bilateral lower extremity edema    Consultants and Procedures     Consultants:  Consults (From admission, onward)          Status Ordering Provider     Inpatient consult to Internal Medicine  Once        Provider:  Emery Harding MD    Acknowledged ALEJANDRO DORANTES             Procedures:   * No surgery found *     Brief History of Present Illness       Delio Daniel Jr. is a 68 y.o. male with PMH of atrial fibrillation on long-term anticoagulation, history of cardiac arrest and MI, HFrEF with EF of 50% via echo 02/09/2023, pacemaker in place, moderate to severe mitral regurgitation, moderate to severe left atrial enlargement, coronary artery disease, hyperlipidemia, hypertension, and hepatic steatosis presented to the ED with a CC of bilateral leg swelling for the last week.  Reports compliance with all medications.  Patient is on diuretics at home.  No other associated symptoms.  Patient denies shortness of breath, chest pain, numbness, weakness, tingling, jaw pain, abdominal pain, dysuria, hematuria, diaphoresis, rash,  "cough, congestion, rhinorrhea, sore throat, and any other symptoms.      In the emergency department, CBC showed a normocytic anemia with an H&H 13.9 and 41.2.  CMP showed hypokalemia at 2.9 and was otherwise normal without other electrolyte derangements.  , which is patient's baseline.  Lactic acid initially 3.0.  A1c 5.4.  UDS is negative.  Infectious disease screening including hepatitis panel, HIV, and syphilis all negative.  Urinalysis is clean.  Chest x-ray shows no acute cardiopulmonary process.  EKG shows atrial fibrillation with a rate of 101.  This has improved.  Given potassium in the ED.     Patient was admitted to internal medicine for treatment of hypokalemia and volume overload.  Vital signs are stable.  Currently saturating 100% on room air.  Patient is hypertensive.  Hydralazine is in.    Hospital Course with Pertinent Findings     Electrolytes replete. Got ECHO in house, normal. Swelling improved. Okay to discharge home. Told to bring in medications to review at next PCP visit.    Discharge physical exam:  Vitals  BP: (!) 152/98  Temp: 98 °F (36.7 °C)  Temp Source: Oral  Pulse: 71  Resp: 20  SpO2: 99 %  Height: 5' 10" (177.8 cm)  Weight: 106.6 kg (235 lb)    Physical Exam  Constitutional:       General: He is not in acute distress.     Appearance: Normal appearance. He is obese. He is not ill-appearing, toxic-appearing or diaphoretic.   HENT:      Head: Normocephalic and atraumatic.   Eyes:      Conjunctiva/sclera: Conjunctivae normal.      Pupils: Pupils are equal, round, and reactive to light.   Cardiovascular:      Rate and Rhythm: Normal rate. Rhythm irregularly irregular.      Heart sounds: Heart sounds not distant. No murmur heard.  Pulmonary:      Effort: Pulmonary effort is normal. No respiratory distress.      Breath sounds: Normal breath sounds. No stridor. No wheezing or rhonchi.   Chest:      Comments: Pacemaker in place to the right chest  Abdominal:      General: Bowel sounds " are normal. There is no distension.      Palpations: Abdomen is soft. There is no mass.      Tenderness: There is no abdominal tenderness. There is no guarding or rebound.      Hernia: No hernia is present.   Musculoskeletal:         General: Normal range of motion.      Cervical back: Normal range of motion and neck supple.      Right lower leg: No edema.      Left lower leg: No edema.   Skin:     General: Skin is warm.      Coloration: Skin is not jaundiced or pale.   Neurological:      General: No focal deficit present.      Mental Status: He is alert and oriented to person, place, and time. Mental status is at baseline.   Psychiatric:         Mood and Affect: Mood normal.         Thought Content: Thought content normal.     TIME SPENT ON DISCHARGE: 60 minutes    Discharge Medications        Medication List        CONTINUE taking these medications      albuterol 90 mcg/actuation inhaler  Commonly known as: PROVENTIL/VENTOLIN HFA  Inhale 2 puffs into the lungs every 6 (six) hours as needed for Wheezing. Rescue     aspirin 81 MG Chew  chew and swallow 1 tablet by mouth daily     atorvastatin 40 MG tablet  Commonly known as: LIPITOR  Take 1 tablet (40 mg total) by mouth once daily.     cetirizine 10 MG tablet  Commonly known as: ZYRTEC  Take 1 tablet (10 mg total) by mouth once daily. for 14 days     diltiaZEM 360 MG 24 hr capsule  Commonly known as: CARDIZEM CD  Take 1 capsule (360 mg total) by mouth once daily.     famotidine 20 MG tablet  Commonly known as: PEPCID  Take 1 tablet (20 mg total) by mouth 2 (two) times daily. for 10 days     hyoscyamine 0.125 mg Tab  Commonly known as: ANASPAZ,LEVSIN     LIDOcaine 5 %  Commonly known as: LIDODERM  Place 1 patch onto the skin once daily. Remove & Discard patch within 12 hours or as directed by MD     losartan 50 MG tablet  Commonly known as: COZAAR  Take 1 tablet (50 mg total) by mouth once daily.     methocarbamoL 750 MG Tab  Commonly known as: ROBAXIN     metoprolol  succinate 200 MG 24 hr tablet  Commonly known as: TOPROL-XL  Take 1 tablet (200 mg total) by mouth 2 (two) times daily.     ondansetron 4 MG Tbdl  Commonly known as: ZOFRAN-ODT     potassium chloride SA 20 MEQ tablet  Commonly known as: K-DUR,KLOR-CON  Take 2 tablets (40 mEq total) by mouth once daily. for 14 days     spironolactone 50 MG tablet  Commonly known as: ALDACTONE  Take 1 tablet (50 mg total) by mouth once daily.     traMADoL 50 mg tablet  Commonly known as: ULTRAM  Take 1 tablet (50 mg total) by mouth every 6 (six) hours as needed for Pain.     vitamin D 1000 units Tab  Commonly known as: VITAMIN D3  Take 1 tablet (1,000 Units total) by mouth once daily.     XARELTO 20 mg Tab  Generic drug: rivaroxaban  TAKE 1 TABLET BY MOUTH DAILY with SUPPER            STOP taking these medications      baclofen 10 MG tablet  Commonly known as: LIORESAL     fluticasone propionate 50 mcg/actuation nasal spray  Commonly known as: FLONASE     GI cocktail antac/dicyc/lidoc     NIFEdipine 30 MG (OSM) 24 hr tablet  Commonly known as: PROCARDIA-XL     omeprazole 20 MG capsule  Commonly known as: PRILOSEC     promethazine 25 MG tablet  Commonly known as: PHENERGAN              Discharge Information:     Mr. Delio Daniel Jr. is being discharged .    Discharge Procedure Orders   Sodium, Random Urine   Standing Status: Future Standing Exp. Date: 11/16/23     Order Specific Question Answer Comments   Specimen Source Urine      Chloride, Random Urine   Standing Status: Future Standing Exp. Date: 11/16/23   Order Comments:       Order Specific Question Answer Comments   Specimen Source Urine      Potassium, Random Urine   Standing Status: Future Standing Exp. Date: 10/16/23   Order Comments:       Order Specific Question Answer Comments   Specimen Source Urine      Ambulatory referral/consult to Internal Medicine   Standing Status: Future   Referral Priority: Routine Referral Type: Consultation   Referral Reason: Specialty Services  Required   Requested Specialty: Internal Medicine   Number of Visits Requested: 1        Follow-Up Appointments:   Follow-up Information       Magdi Rivera DO Follow up.    Specialty: Internal Medicine  Contact information:  [address] [facility] [address] [phone]               [facility] Dept Follow up.    Specialty: Emergency Medicine  Contact information:  [address] [facility] [address] [phone]                             Told to bring in all medictions from home to review    Magdi Rivera DO  Internal Medicine - PGY-3

## 2023-08-16 NOTE — NURSING
Patient discharge per wc accompanied by daughter in stable condition. List of all upcoming clinic appointments given to patient.

## 2023-08-16 NOTE — PT/OT/SLP PROGRESS
Physical Therapy Treatment    Patient Name:  Delio Daniel Jr.   MRN:  [id number]    Recommendations     Discharge Recommendations:  home with home health   Discharge Equipment Recommendations: bath bench, walker, rolling (pt's quad cane is borrowed and rubber tips worn out w/ metal of cane visable on bottom)      *current quad cane being utilized by patient  - unsafe and in poor shape - tape over rubber tips, metal of cane base visible on bottom of all 4 tips     Barriers to discharge: severity of deficits and decreased endurance    Assessment     Delio Daniel Jr. is a 68 y.o. male admitted with a medical diagnosis of Bilateral lower extremity edema.    Heart failure with reduced ejection fraction EF of 50%  Coronary artery disease  Hyperlipidemia  Hypokalemia  Atrial fibrillation  Hypertension  Lactic acidosis resolved        Patient Active Problem List   Diagnosis    Neuroendocrine carcinoma of small bowel    Nodule of left lung    Longstanding persistent atrial fibrillation    Hypertension    Hyperlipidemia LDL goal <70    Hypokalemia    Coronary artery disease involving native heart without angina pectoris    Second degree AV block    Cardiac arrest with ventricular fibrillation    Cardiac pacemaker in situ    History of deep vein thrombosis (DVT) of lower extremity    Current use of long term anticoagulation    Hypertensive urgency     He presents with the following impairments/functional limitations:  gait instability, impaired endurance, impaired balance, pain.    Rehab Prognosis: Good.    Patient would benefit from continued skilled acute PT services to: address above listed impairments/functional limitations; receive patient/caregiver education; reduce fall risk; and maximize independency/safety with functional mobility.    -continued: up-to-chair, ambulation, with progression of gait distance/frequency/duration and speed, as tolerated/appropriate, with assistance and supervision    Recent Surgery: * No  surgery found *      Plan     During this hospitalization, patient to be seen 3 x/week to address the identified impairments/functional limitations via gait training, therapeutic activities, therapeutic exercises and progress toward the established goals.    Plan of Care Expires:  09/12/23    Subjective     Communicated with patient's nurse Farooq prior to session.    Patient agreeable to participate in treatment session.    Chief Complaint: pain in LE's and back  Patient/Family Comments/goals: home  Pain/Comfort:  Pain Rating 1: 9/10  Location - Side 1: Bilateral  Location - Orientation 1: lower  Location 1: back  Pain Addressed 1: Nurse notified  Pain Rating Post-Intervention 1: 9/10  Pain Rating 2: 5/10  Location - Side 2: Bilateral  Location - Orientation 2: distal  Location 2: leg  Pain Addressed 2: Nurse notified  Pain Rating Post-Intervention 2: 5/10    Objective     Patient found supine in bed, with HOB elevated, and bed rails up bilateral HOB with telemetry, peripheral IV (pacemaker)  upon PT entry to room.    General Precautions: Standard, fall, pacemaker   Orthopedic Precautions:N/A   Braces: N/A  Respiratory Status: room air    Functional Mobility:     Bed Mobility:  Rolling Right: independence  Rolling Left: independence  Scooting: modified independence  Supine to Sit: independence  Sit to Supine: independence  with no cues required     Transfers:  Sit to Stand: modified independence with no assistive device  with no cues required     Gait:  Patient requested restroom  Patient ambulated 20ft to toilet  Patient ambulated 25ft to sink and performed hand hygiene  Patient ambulated 75ft  Sitting rest x4 minutes  Patient ambulated 130ft  With rolling walker and modified independence.  Patient demonstrates no loss of balance, no mis-steps, steady gait, decreased step length, slightly widened base of support, and flexed posture.     Per patient - I prefer the quad cane I've been using to all of the other  devices (MIGUEL A Hernandez notified of patients desire to continue small based quad cane use)     Other Mobility:  not assessed     Balance:  Sit  Patient demonstrated static balance on level surface with independence with no verbal cues.  Patient demonstrated dynamic balance on level surface with independence with no verbal cues during moderate excursions.  Stand  Patient demonstrated static balance on level surface using rolling walker with modified independence with no verbal cues.     Patient left supine in bed w/ HOB elevated w/ bed rails up bilat HOB with telemetry, peripheral IV (pacemaker), with all lines intact, call button in reach, tray table at bedside, pt's nurse Farooq notified at treatment end.    Goals     Multidisciplinary Problems       Physical Therapy Goals       Problem: Physical Therapy    Goal Priority Disciplines Outcome Goal Variances Interventions   Physical Therapy Goal     PT, PT/OT Ongoing, Progressing     Description: Goals to be met by: DISCHARGE     Patient will increase functional independence with mobility by performing:    -. Supine to sit with [place] - MET 08/16/2023  -. Sit to supine with [place] - MET 08/16/2023  -. Rolling to Left and Right with [place] - MET 08/16/2023  --. Sit to stand transfer with Modified [place] - MET 08/16/2023  -. Gait  x 130 feet with Modified [place] using Rolling Walker - MET 08/16/2023  -. Ascend/descend 3 steps with right Handrails Supervision using No Assistive Device - ONGOING           Time Tracking     PT Received On: 08/16/23  PT Start Time: 1349     PT Stop Time: 1419  PT Total Time (min): 30 min     Billable Minutes: Gait Training 30    08/16/2023

## 2023-08-16 NOTE — PROGRESS NOTES
Informed [employer], nor [facility] carry quad canes. [facility] does not accept pt's insurance. Encouraged pt to have family assist with purchasing online through [employer] for best price.

## 2023-08-16 NOTE — MEDICAL/APP STUDENT
[facility] Medicine Wards Progress Note     Resident Team: [facility] Medicine List 3  Attending Physician: Jesus Daniel MD  Medical Student: Josephine Mari      Subjective:      Brief HPI:  Delio Daniel is a 69 yo male with PMH of afib, HFpEF, HTN, HLD, CAD, NSTEMI () and PPM () which was upgraded to dual-chamber ICD (2018) secondary to VFIB arrest (3/2018). He presented to the ED on  c/o BLE edema for 1 week. He denied any other complaints at that time. He denied recent changes in his medications and reported that he had been adherent to his medication regimen. He was seen in the ED on  for similar complaints but was discharged that day after diuresis and stable condition. He was seen by cardiology on  who referred him to Dr. Hernandez for AV steven ablation, however he did not go to that appointment and was then referred to EP in [facility]. At that appointment he was advised to continue his current medication regimen, present for EP in [facility] for BiV upgrade, and return for device check in 3 months.    Hospital Course:  : Mr. Daniel was hypertensive on arrival to the ED at 165/128. He also presented with a BNP of 329 and K of 2.9. His EKG showed afib with RVR and ST depressions and T wave inversions in the lateral leads. Troponin was 0.018. CXR showed cardiomegaly unchanged from prior study. He was given hydralazine and KCL 80meq. He was diuresed with Lasix 40mg with 3 L urine output. He also had an elevated lactic acid which resolved with LR bolus.  8/15: Improvement in LE edema. Potassium increased to 3.6. Complaining of long dystrophic toenails.    Interval History:   NAEON. Denies chest pain and shortness of breath. Reports improvement in LE edema and good urine output.     Review of Systems:  ROS completed and negative except as indicated above.     Objective:     Last 24 Hour Vital Signs:  BP  Min: 131/94  Max: 145/98  Temp  Av.3 °F (36.8 °C)  Min: 97.8 °F (36.6 °C)  Max: 98.8 °F (37.1 °C)  Pulse   Av.1  Min: 68  Max: 102  Resp  Av.3  Min: 17  Max: 20  SpO2  Av.9 %  Min: 98 %  Max: 100 %  Weight  Av.6 kg (235 lb)  Min: 106.6 kg (235 lb)  Max: 106.6 kg (235 lb)  I/O last 3 completed shifts:  In: 2370.2 [P.O.:1030; I.V.:551.9; IV Piggyback:788.3]  Out: 1200 [Urine:1200]    Physical Examination:  Physical Exam  Vitals reviewed.   Constitutional:       General: He is not in acute distress.     Appearance: Normal appearance. He is obese. He is not ill-appearing.      Comments: Patient resting comfortably in bed.   Eyes:      Extraocular Movements: Extraocular movements intact.      Conjunctiva/sclera: Conjunctivae normal.   Cardiovascular:      Rate and Rhythm: Normal rate. Rhythm irregularly irregular.      Heart sounds: Normal heart sounds. No murmur heard.  Pulmonary:      Effort: Pulmonary effort is normal.      Breath sounds: Normal breath sounds. No wheezing.   Chest:      Comments: Pacemaker in place to right chest.  Abdominal:      General: Abdomen is flat. Bowel sounds are normal. There is no distension.      Palpations: Abdomen is soft.   Musculoskeletal:         General: No swelling.      Right lower leg: No edema.      Left lower leg: No edema.   Skin:     General: Skin is warm and dry.      Comments: Long dystrophic toenails bilaterally.   Neurological:      General: No focal deficit present.      Mental Status: He is alert.   Psychiatric:         Mood and Affect: Mood normal.        Laboratory:  Most Recent Data:  CBC:   Recent Labs   Lab 23  0147 23  0846 08/15/23  0333 23  0323   WBC 6.16 5.41 7.74 6.56   HGB 13.6* 13.9* 14.1 14.0   HCT 40.4* 41.2* 41.9* 42.8    223 227 229   MCV 88.2 89.4 88.6 89.9     BMP:   Lab Results   Component Value Date     2023    K 3.1 (L) 2023    CO2 23 2023    BUN 12.0 2023    CREATININE 1.13 2023    CALCIUM 9.0 2023    MG 2.00 2023    PHOS 3.3 2023     LFTs:   Lab Results    Component Value Date    ALBUMIN 3.2 (L) 08/16/2023    BILITOT 1.1 08/16/2023    AST 23 08/16/2023    ALKPHOS 55 08/16/2023    ALT 18 08/16/2023     Coags:   Lab Results   Component Value Date    INR 1.0 08/14/2023    PROTIME 13.3 08/14/2023    PTT 29.1 08/14/2023     FLP:   Lab Results   Component Value Date    CHOL 127 08/14/2023    HDL 31 (L) 08/14/2023    TRIG 103 08/14/2023     Thyroid:   Lab Results   Component Value Date    TSH 2.901 08/14/2023     Anemia:   Lab Results   Component Value Date    IRON 66 08/14/2023    TIBC 340 08/14/2023    FERRITIN 81.39 08/14/2023     Cardiac:   Lab Results   Component Value Date    TROPONINI 0.018 08/14/2023    .2 (H) 08/14/2023     Other Results:  8/15 BLE Venous US: There was no scanned evidence of DVT within the visualized veins of the bilateral lower extremities.    8/15 TTE: Rhythm is atrial fibrillation. There is normal systolic function in left ventricle with a visually estimated ejection fraction of 55 - 60%. Unable to assess due to atrial fibrillation.  Left atrium is dilated. Normal right ventricular cavity size. The aortic valve is a trileaflet valve. There is mild regurgitation of the mitral valve with an anteromedial eccentrically directed jet.     8/15 EKG (my interpretation): atrial fibrillation, rate 101 with rapid ventricular response. ST depressions and T wave inversions in leads I, V5, V6.    8/14 EKG (my interpretation): atrial fibrillation, rate 105 with rapid ventricular response. ST depressions and T wave inversions in leads I, V5, V6.     Radiology:  Imaging Results              X-Ray Chest AP Portable (Final result)  Result time 08/14/23 11:38:10      Final result by Sami Taylor MD (08/14/23 11:38:10)                   Impression:      NO ACUTE CARDIOPULMONARY PROCESS IDENTIFIED.      Electronically signed by: Sami Taylor  Date:    08/14/2023  Time:    11:38               Narrative:    EXAMINATION:  XR CHEST AP PORTABLE    CLINICAL  "HISTORY:  Chest Pain;    TECHNIQUE:  One view    COMPARISON:  August 11, 2020.    FINDINGS:  Cardiopericardial silhouette enlarged appearance is similar.  Right chest implanted cardiac device electrodes terminate within the right atrium and the right ventricle.  No acute dense focal or segmental consolidation, congestive process, pleural effusions or pneumothorax.                                      Current Medications:     Scheduled:   aspirin  81 mg Oral Daily    atorvastatin  40 mg Oral Daily    diltiaZEM  360 mg Oral Daily    losartan  100 mg Oral Daily    metoprolol succinate  200 mg Oral BID    rivaroxaban  20 mg Oral with dinner    spironolactone  50 mg Oral Daily        PRN:  albuterol, glucagon (human recombinant), glucose, glucose, hydrALAZINE, insulin aspart U-100, naloxone, sodium chloride 0.9%      Assessment & Plan:     HFpEF   CAD  HLD  BLE Edema       - March 2023 TTE EF of 50%       -  on admission       - 8/15 TTE showed EF 55-60% with afib, OCTAVIA, and mild MR       - Continue spironolactone 50mg qD, losartan 100mg qD, and metoprolol succinate 200mg BID       - Nifedipine discontinued on 8/15. Apparently was discontinued at his last clinic visit but patient continued to take it       - Continue atorvastatin 40mg qD       - BLE venous US without abnormality       - BLE edema improved since admission    Atrial Fibrillation       - Continue rivaroxaban 20mg qD       - Currently rate controlled, last HR 68       - Has cardiology appointment scheduled for 8/22    HTN       - /98 this AM       - Continue current medication regimen       - Hydralazine 10mg q4hrs PRN for SBP >160 and DBP >110    Hypokalemia       - K of 2.9 on admission, supplemented with 80 Kcl       - Patient reported non adherence to home KCl 20meq qD. Reported that he was "only taking one pill, not two"       - K of 3.1 this AM       - Supplemented with KCl 40meq    Lactic Acidosis       - Elevated lactic acid on admission, " given LR bolus       - Resolved    CODE STATUS: Full  Access: N/A  Antibiotics: N/A  Diet: Heart Healthy  DVT Prophylaxis: N/A  GI Prophylaxis: N/A  Fluids: N/A      Disposition: Joan Daniel is a 69 yo male with PMH afib, HFpEF, HTN, HLD, CAD, NSTEMI (2003), VFIB arrest (2018), and ICD placement (2018). He presented to the ED on 8/14 with BLE edema and hypokalemia, diuresed 3 L and supplemented with KCl. BLE edema has resolved. Potassium of 3.1 this AM, supplemented. Will advise patient to adhere to home KCl 20meq qD. Patient complaining of long dystrophic nails, wound care consulted. Patient has scheduled appointment with cardiology on 8/22. Will evaluate medications on outpatient basis. Patient stable for discharge today.        Josephine Mari, MS4  [facility] Internal Medicine

## 2023-08-17 ENCOUNTER — HOSPITAL ENCOUNTER (EMERGENCY)
Facility: HOSPITAL | Age: 68
Discharge: HOME | End: 2023-08-17
Attending: FAMILY MEDICINE
Payer: MEDICARE

## 2023-08-17 VITALS
WEIGHT: 230 LBS | DIASTOLIC BLOOD PRESSURE: 79 MMHG | HEART RATE: 65 BPM | BODY MASS INDEX: 34.07 KG/M2 | HEIGHT: 69 IN | SYSTOLIC BLOOD PRESSURE: 141 MMHG | TEMPERATURE: 96 F | OXYGEN SATURATION: 98 % | RESPIRATION RATE: 18 BRPM

## 2023-08-17 DIAGNOSIS — M54.50 ACUTE BILATERAL LOW BACK PAIN WITHOUT SCIATICA: Primary | ICD-10-CM

## 2023-08-17 LAB
ALBUMIN SERPL-MCNC: 3.5 G/DL (ref 3.4–4.8)
ALBUMIN/GLOB SERPL: 1.1 RATIO (ref 1.1–2)
ALP SERPL-CCNC: 52 UNIT/L (ref 40–150)
ALT SERPL-CCNC: 17 UNIT/L (ref 0–55)
AST SERPL-CCNC: 21 UNIT/L (ref 5–34)
BASOPHILS # BLD AUTO: 0.07 X10(3)/MCL
BASOPHILS NFR BLD AUTO: 0.9 %
BILIRUB SERPL-MCNC: 1.1 MG/DL
BUN SERPL-MCNC: 13.5 MG/DL (ref 8.4–25.7)
CALCIUM SERPL-MCNC: 9.1 MG/DL (ref 8.8–10)
CHLORIDE SERPL-SCNC: 107 MMOL/L (ref 98–107)
CO2 SERPL-SCNC: 23 MMOL/L (ref 23–31)
CREAT SERPL-MCNC: 1.18 MG/DL (ref 0.73–1.18)
EOSINOPHIL # BLD AUTO: 0.24 X10(3)/MCL (ref 0–0.9)
EOSINOPHIL NFR BLD AUTO: 3 %
ERYTHROCYTE [DISTWIDTH] IN BLOOD BY AUTOMATED COUNT: 14.7 % (ref 11.5–17)
GFR SERPLBLD CREATININE-BSD FMLA CKD-EPI: >60 MLS/MIN/1.73/M2
GLOBULIN SER-MCNC: 3.1 GM/DL (ref 2.4–3.5)
GLUCOSE SERPL-MCNC: 95 MG/DL (ref 82–115)
HCT VFR BLD AUTO: 39.3 % (ref 42–52)
HGB BLD-MCNC: 12.8 G/DL (ref 14–18)
IMM GRANULOCYTES # BLD AUTO: 0.05 X10(3)/MCL (ref 0–0.04)
IMM GRANULOCYTES NFR BLD AUTO: 0.6 %
LYMPHOCYTES # BLD AUTO: 2.01 X10(3)/MCL (ref 0.6–4.6)
LYMPHOCYTES NFR BLD AUTO: 24.9 %
MAGNESIUM SERPL-MCNC: 1.9 MG/DL (ref 1.6–2.6)
MCH RBC QN AUTO: 29.4 PG (ref 27–31)
MCHC RBC AUTO-ENTMCNC: 32.6 G/DL (ref 33–36)
MCV RBC AUTO: 90.3 FL (ref 80–94)
MONOCYTES # BLD AUTO: 1 X10(3)/MCL (ref 0.1–1.3)
MONOCYTES NFR BLD AUTO: 12.4 %
NEUTROPHILS # BLD AUTO: 4.69 X10(3)/MCL (ref 2.1–9.2)
NEUTROPHILS NFR BLD AUTO: 58.2 %
NRBC BLD AUTO-RTO: 0 %
PLATELET # BLD AUTO: 228 X10(3)/MCL (ref 130–400)
PMV BLD AUTO: 10.1 FL (ref 7.4–10.4)
POTASSIUM SERPL-SCNC: 3.7 MMOL/L (ref 3.5–5.1)
PROT SERPL-MCNC: 6.6 GM/DL (ref 5.8–7.6)
RBC # BLD AUTO: 4.35 X10(6)/MCL (ref 4.7–6.1)
SODIUM SERPL-SCNC: 141 MMOL/L (ref 136–145)
WBC # SPEC AUTO: 8.06 X10(3)/MCL (ref 4.5–11.5)

## 2023-08-17 PROCEDURE — 83735 ASSAY OF MAGNESIUM: CPT | Performed by: FAMILY MEDICINE

## 2023-08-17 PROCEDURE — 99283 EMERGENCY DEPT VISIT LOW MDM: CPT

## 2023-08-17 PROCEDURE — 80053 COMPREHEN METABOLIC PANEL: CPT | Performed by: FAMILY MEDICINE

## 2023-08-17 PROCEDURE — 85025 COMPLETE CBC W/AUTO DIFF WBC: CPT | Performed by: FAMILY MEDICINE

## 2023-08-17 PROCEDURE — 25000003 PHARM REV CODE 250: Performed by: FAMILY MEDICINE

## 2023-08-17 RX ORDER — TRAMADOL HYDROCHLORIDE 50 MG/1
50 TABLET ORAL EVERY 6 HOURS PRN
Qty: 12 TABLET | Refills: 0 | Status: SHIPPED | OUTPATIENT
Start: 2023-08-17 | End: 2023-10-16

## 2023-08-17 RX ORDER — HYDROCODONE BITARTRATE AND ACETAMINOPHEN 7.5; 325 MG/1; MG/1
1 TABLET ORAL
Status: COMPLETED | OUTPATIENT
Start: 2023-08-17 | End: 2023-08-17

## 2023-08-17 RX ADMIN — HYDROCODONE BITARTRATE AND ACETAMINOPHEN 1 TABLET: 7.5; 325 TABLET ORAL at 02:08

## 2023-08-17 NOTE — PT/OT/SLP DISCHARGE
POST DISCHARGE DOCUMENTATION - 08/17/2023 7:20 AM    Physical Therapy Discharge Note    Documenting Physical Therapist did not evaluate OR treat the patient.    Evaluating/treating Physical Therapist is not available to complete discharge summary.    Refer to prior Physical Therapy note dated 08/16/2023 for last known functional status of patient.      Name: Delio Daniel Jr.  MRN: [id number]   Principal Problem: Bilateral lower extremity edema       Recommendations: per last treatment session     Discharge Recommendations:  home with home health   Discharge Equipment Recommendations: bath bench, walker, rolling (pt's quad cane is borrowed and rubber tips worn out w/ metal of cane visable on bottom)     Assessment:     Patient was unexpectedly discharged from hospital.  Refer to therapy's initial evaluation or last treatment note for patient's most recent functional status and goal achievement and therapists' recommendations.    Objective:     GOALS:  Multidisciplinary Problems       Physical Therapy Goals          Problem: Physical Therapy    Goal Priority Disciplines Outcome Goal Variances Interventions   Physical Therapy Goal     PT, PT/OT Partially met     Description: Goals to be met by: DISCHARGE     Patient will increase functional independence with mobility by performing:    -. Supine to sit with [place] - MET 08/16/2023  -. Sit to supine with [place] - MET 08/16/2023  -. Rolling to Left and Right with [place] - MET 08/16/2023  --. Sit to stand transfer with Modified Lanier - MET 08/16/2023  -. Gait  x 130 feet with Modified [place] using Rolling Walker - MET 08/16/2023  -. Ascend/descend 3 steps with right Handrails Supervision using No Assistive Device - not met                       Plan:     Patient Discharged to: home or self care per chart.      8/17/2023

## 2023-08-17 NOTE — ED PROVIDER NOTES
"Encounter Date: 8/17/2023       History     Chief Complaint   Patient presents with    Back Pain    Leg Swelling     Pt reports to the ed c/o back pain and "fluid" in his lower extremities. Was recently discharged for similar symptoms. Hanna garcia     Patient is a 68-year-old gentleman presents emergency room for evaluation due to complaints of lower back pain and lower extremity edema.  Patient reports just being discharged from the hospital yesterday for lower extremity edema.  Reports lower back pain began shortly after discharge.  Denies dysuria or hematuria.  Denies constipation or diarrhea.  Denies nausea or vomiting.    The history is provided by the patient.     Review of patient's allergies indicates:  No Known Allergies  Past Medical History:   Diagnosis Date    Arthritis     Atrial fibrillation     BPH (benign prostatic hyperplasia)     Cardiac arrest     Coronary artery disease     Cyst, kidney, acquired     Diverticulosis     Hyperlipidemia     Hypertension     MI (myocardial infarction)     Obesity     Steatosis of liver      Past Surgical History:   Procedure Laterality Date    A-V CARDIAC PACEMAKER INSERTION Right     CARDIAC CATHETERIZATION      COLONOSCOPY W/ BIOPSIES      excision of colon       Family History   Problem Relation Age of Onset    Hypertension Mother     Hypertension Father     Hypertension Sister      Social History     Tobacco Use    Smoking status: Former     Current packs/day: 0.00    Smokeless tobacco: Never   Substance Use Topics    Alcohol use: Not Currently    Drug use: Not Currently     Review of Systems   Constitutional:  Negative for chills, fatigue and fever.   HENT:  Negative for ear pain, rhinorrhea and sore throat.    Eyes:  Negative for photophobia and pain.   Respiratory:  Negative for cough, shortness of breath and wheezing.    Cardiovascular:  Positive for leg swelling. Negative for chest pain.   Gastrointestinal:  Negative for abdominal pain, diarrhea, nausea and " vomiting.   Genitourinary:  Negative for dysuria.   Musculoskeletal:  Positive for back pain.   Neurological:  Negative for dizziness, weakness and headaches.   All other systems reviewed and are negative.      Physical Exam     Initial Vitals   BP Pulse Resp Temp SpO2   08/17/23 0205 08/17/23 0205 08/17/23 0205 08/17/23 0210 08/17/23 0205   (!) 156/100 84 20 96.4 °F (35.8 °C) 98 %      MAP       --                Physical Exam    Nursing note and vitals reviewed.  Constitutional: He appears well-developed and well-nourished.   HENT:   Head: Normocephalic and atraumatic.   Eyes: EOM are normal. Pupils are equal, round, and reactive to light.   Neck: Neck supple.   Normal range of motion.  Cardiovascular:  Normal rate, regular rhythm, normal heart sounds and intact distal pulses.     Exam reveals no gallop and no friction rub.       No murmur heard.  Pulmonary/Chest: Breath sounds normal. No respiratory distress.   Abdominal: Abdomen is soft. Bowel sounds are normal. He exhibits no distension. There is no abdominal tenderness.   Musculoskeletal:         General: No edema. Normal range of motion.      Cervical back: Normal range of motion and neck supple.      Comments: Patient has no lower extremity edema present.  2+ dorsalis pedis pulses bilaterally.  Mild bilateral paralumbar tenderness to palpation.  No tenderness to palpation over lumbar spine itself.  No erythema or soft tissue swelling.     Neurological: He is alert and oriented to person, place, and time. He has normal strength.   5/5 strength bilateral lower extremities   Skin: Skin is warm and dry. Capillary refill takes less than 2 seconds.   Psychiatric: He has a normal mood and affect. His behavior is normal. Judgment and thought content normal.         ED Course   Procedures  Labs Reviewed   CBC WITH DIFFERENTIAL - Abnormal; Notable for the following components:       Result Value    RBC 4.35 (*)     Hgb 12.8 (*)     Hct 39.3 (*)     MCHC 32.6 (*)      "IG# 0.05 (*)     All other components within normal limits   MAGNESIUM - Normal   CBC W/ AUTO DIFFERENTIAL    Narrative:     The following orders were created for panel order CBC Auto Differential.  Procedure                               Abnormality         Status                     ---------                               -----------         ------                     CBC with Differential[[id number]]        Abnormal            Final result                 Please view results for these tests on the individual orders.   COMPREHENSIVE METABOLIC PANEL          Imaging Results    None          Medications   HYDROcodone-acetaminophen 7.5-325 mg per tablet 1 tablet (1 tablet Oral Given 8/17/23 0248)     Medical Decision Making:   History:   Old Records Summarized: records from previous admission(s).       <> Summary of Records: Reviewed discharge summary from 08/16/23 (yesterday) for admission due to CHF with volume overload and hypokalemia.  Initial Assessment:   Patient is a 68-year-old gentleman presents emergency room for evaluation with complaints of lower back pain, arrived by ambulance.  Patient reports "they never gave him any medicine for back pain".  Patient denies dysuria or hematuria.  Discussed with patient, will obtain laboratory evaluation to recheck potassium level, and will provide medications to help with lower back pain.  Differential Diagnosis:   Lower back pain, electrolyte abnormality  Clinical Tests:   Lab Tests: Ordered and Reviewed  The following lab test(s) were unremarkable: CMP                          Clinical Impression:   Final diagnoses:  [M54.50] Acute bilateral low back pain without sciatica (Primary)        ED Disposition Condition    Discharge Stable          ED Prescriptions       Medication Sig Dispense Start Date End Date Auth. Provider    traMADoL (ULTRAM) 50 mg tablet Take 1 tablet (50 mg total) by mouth every 6 (six) hours as needed for Pain. 12 tablet 8/17/2023 -- David" Eduardo LEUNG MD          Follow-up Information       Follow up With Specialties Details Why Contact Info    Magdi Rivera, DO Internal Medicine   [address] [facility] [address]  [phone]      [facility] Emergency Medicine  As needed, If symptoms worsen [address] [facility] [id number] [phone]             Eduardo Hernandez MD  08/17/23 0316

## 2023-08-17 NOTE — ED NOTES
Reports pain to legs and feet. Last medicated while in hospital prior to discharge from admission. Minimal edema left ankle at sock line. Pedal pulses palpable.

## 2023-08-19 ENCOUNTER — HOSPITAL ENCOUNTER (EMERGENCY)
Facility: HOSPITAL | Age: 68
Discharge: HOME | End: 2023-08-19
Attending: EMERGENCY MEDICINE
Payer: MEDICARE

## 2023-08-19 VITALS
OXYGEN SATURATION: 98 % | DIASTOLIC BLOOD PRESSURE: 93 MMHG | HEART RATE: 100 BPM | BODY MASS INDEX: 34.06 KG/M2 | TEMPERATURE: 97 F | HEIGHT: 69 IN | WEIGHT: 229.94 LBS | SYSTOLIC BLOOD PRESSURE: 151 MMHG | RESPIRATION RATE: 20 BRPM

## 2023-08-19 DIAGNOSIS — M54.9 BACK PAIN, UNSPECIFIED BACK LOCATION, UNSPECIFIED BACK PAIN LATERALITY, UNSPECIFIED CHRONICITY: Primary | ICD-10-CM

## 2023-08-19 DIAGNOSIS — R60.0 LOWER EXTREMITY EDEMA: ICD-10-CM

## 2023-08-19 DIAGNOSIS — Z13.9 SCREENING DUE: ICD-10-CM

## 2023-08-19 LAB
ALBUMIN SERPL-MCNC: 3.6 G/DL (ref 3.4–4.8)
ALBUMIN/GLOB SERPL: 1 RATIO (ref 1.1–2)
ALP SERPL-CCNC: 56 UNIT/L (ref 40–150)
ALT SERPL-CCNC: 17 UNIT/L (ref 0–55)
AMPHET UR QL SCN: NEGATIVE
APPEARANCE UR: CLEAR
AST SERPL-CCNC: 21 UNIT/L (ref 5–34)
BACTERIA #/AREA URNS AUTO: ABNORMAL /HPF
BARBITURATE SCN PRESENT UR: NEGATIVE
BASOPHILS # BLD AUTO: 0.05 X10(3)/MCL
BASOPHILS NFR BLD AUTO: 0.7 %
BENZODIAZ UR QL SCN: NEGATIVE
BILIRUB SERPL-MCNC: 2 MG/DL
BILIRUB UR QL STRIP.AUTO: NEGATIVE
BNP BLD-MCNC: 368.7 PG/ML
BUN SERPL-MCNC: 11.2 MG/DL (ref 8.4–25.7)
CALCIUM SERPL-MCNC: 9.1 MG/DL (ref 8.8–10)
CANNABINOIDS UR QL SCN: NEGATIVE
CHLORIDE SERPL-SCNC: 107 MMOL/L (ref 98–107)
CK MB SERPL-MCNC: 2.2 NG/ML
CK SERPL-CCNC: 239 U/L (ref 30–200)
CO2 SERPL-SCNC: 24 MMOL/L (ref 23–31)
COCAINE UR QL SCN: NEGATIVE
COLOR UR: YELLOW
CREAT SERPL-MCNC: 1.05 MG/DL (ref 0.73–1.18)
EOSINOPHIL # BLD AUTO: 0.08 X10(3)/MCL (ref 0–0.9)
EOSINOPHIL NFR BLD AUTO: 1.1 %
ERYTHROCYTE [DISTWIDTH] IN BLOOD BY AUTOMATED COUNT: 14.6 % (ref 11.5–17)
ETHANOL SERPL-MCNC: <10 MG/DL
FENTANYL UR QL SCN: NEGATIVE
FLUAV AG UPPER RESP QL IA.RAPID: NOT DETECTED
FLUBV AG UPPER RESP QL IA.RAPID: NOT DETECTED
GFR SERPLBLD CREATININE-BSD FMLA CKD-EPI: >60 MLS/MIN/1.73/M2
GLOBULIN SER-MCNC: 3.7 GM/DL (ref 2.4–3.5)
GLUCOSE SERPL-MCNC: 85 MG/DL (ref 82–115)
GLUCOSE UR QL STRIP.AUTO: NORMAL
HCT VFR BLD AUTO: 40.2 % (ref 42–52)
HGB BLD-MCNC: 13.3 G/DL (ref 14–18)
HYALINE CASTS #/AREA URNS LPF: ABNORMAL /LPF
IMM GRANULOCYTES # BLD AUTO: 0.03 X10(3)/MCL (ref 0–0.04)
IMM GRANULOCYTES NFR BLD AUTO: 0.4 %
KETONES UR QL STRIP.AUTO: NEGATIVE
LACTATE SERPL-SCNC: 0.9 MMOL/L (ref 0.5–2.2)
LEUKOCYTE ESTERASE UR QL STRIP.AUTO: 250
LIPASE SERPL-CCNC: 18 U/L
LYMPHOCYTES # BLD AUTO: 1.78 X10(3)/MCL (ref 0.6–4.6)
LYMPHOCYTES NFR BLD AUTO: 24.5 %
MAGNESIUM SERPL-MCNC: 1.9 MG/DL (ref 1.6–2.6)
MCH RBC QN AUTO: 30 PG (ref 27–31)
MCHC RBC AUTO-ENTMCNC: 33.1 G/DL (ref 33–36)
MCV RBC AUTO: 90.5 FL (ref 80–94)
MDMA UR QL SCN: NEGATIVE
MONOCYTES # BLD AUTO: 0.81 X10(3)/MCL (ref 0.1–1.3)
MONOCYTES NFR BLD AUTO: 11.1 %
MUCOUS THREADS URNS QL MICRO: ABNORMAL /LPF
NEUTROPHILS # BLD AUTO: 4.52 X10(3)/MCL (ref 2.1–9.2)
NEUTROPHILS NFR BLD AUTO: 62.2 %
NITRITE UR QL STRIP.AUTO: NEGATIVE
NRBC BLD AUTO-RTO: 0 %
OPIATES UR QL SCN: NEGATIVE
PCP UR QL: NEGATIVE
PH UR STRIP.AUTO: 6 [PH]
PH UR: 6 [PH] (ref 3–11)
PLATELET # BLD AUTO: 212 X10(3)/MCL (ref 130–400)
PMV BLD AUTO: 9.9 FL (ref 7.4–10.4)
POTASSIUM SERPL-SCNC: 3.4 MMOL/L (ref 3.5–5.1)
PROT SERPL-MCNC: 7.3 GM/DL (ref 5.8–7.6)
PROT UR QL STRIP.AUTO: ABNORMAL
RBC # BLD AUTO: 4.44 X10(6)/MCL (ref 4.7–6.1)
RBC #/AREA URNS AUTO: ABNORMAL /HPF
RBC UR QL AUTO: ABNORMAL
SARS-COV-2 RNA RESP QL NAA+PROBE: NOT DETECTED
SODIUM SERPL-SCNC: 141 MMOL/L (ref 136–145)
SP GR UR STRIP.AUTO: 1.02
SPECIFIC GRAVITY, URINE AUTO (.000) (OHS): 1.02 (ref 1–1.03)
SQUAMOUS #/AREA URNS LPF: ABNORMAL /HPF
TROPONIN I SERPL-MCNC: 0.01 NG/ML (ref 0–0.04)
UROBILINOGEN UR STRIP-ACNC: NORMAL
WBC # SPEC AUTO: 7.27 X10(3)/MCL (ref 4.5–11.5)
WBC #/AREA URNS AUTO: ABNORMAL /HPF

## 2023-08-19 PROCEDURE — 80053 COMPREHEN METABOLIC PANEL: CPT | Performed by: EMERGENCY MEDICINE

## 2023-08-19 PROCEDURE — 83735 ASSAY OF MAGNESIUM: CPT | Performed by: EMERGENCY MEDICINE

## 2023-08-19 PROCEDURE — 81001 URINALYSIS AUTO W/SCOPE: CPT | Performed by: EMERGENCY MEDICINE

## 2023-08-19 PROCEDURE — 85025 COMPLETE CBC W/AUTO DIFF WBC: CPT | Performed by: EMERGENCY MEDICINE

## 2023-08-19 PROCEDURE — 99284 EMERGENCY DEPT VISIT MOD MDM: CPT | Mod: 25

## 2023-08-19 PROCEDURE — 83690 ASSAY OF LIPASE: CPT | Performed by: EMERGENCY MEDICINE

## 2023-08-19 PROCEDURE — 0240U COVID/FLU A&B PCR: CPT | Performed by: EMERGENCY MEDICINE

## 2023-08-19 PROCEDURE — 83605 ASSAY OF LACTIC ACID: CPT | Performed by: EMERGENCY MEDICINE

## 2023-08-19 PROCEDURE — 84484 ASSAY OF TROPONIN QUANT: CPT | Performed by: EMERGENCY MEDICINE

## 2023-08-19 PROCEDURE — 82553 CREATINE MB FRACTION: CPT | Performed by: EMERGENCY MEDICINE

## 2023-08-19 PROCEDURE — 83880 ASSAY OF NATRIURETIC PEPTIDE: CPT | Performed by: EMERGENCY MEDICINE

## 2023-08-19 PROCEDURE — 82077 ASSAY SPEC XCP UR&BREATH IA: CPT | Performed by: EMERGENCY MEDICINE

## 2023-08-19 PROCEDURE — 82550 ASSAY OF CK (CPK): CPT | Performed by: EMERGENCY MEDICINE

## 2023-08-19 PROCEDURE — 80307 DRUG TEST PRSMV CHEM ANLYZR: CPT | Performed by: EMERGENCY MEDICINE

## 2023-08-19 NOTE — ED PROVIDER NOTES
Encounter Date: 8/19/2023       History     Chief Complaint   Patient presents with    Leg Swelling     C/o continued leg swelling and pain after getting out of hospital this week. Did not take meds today. Stated went  meds that was ordered but did not take them     continued lower extremity edema, setting of known congestive heart failure, recent admission; continues to report low back pain (was seen yesterday for same and prescribed tramadol; patient has not yet taken any). Here the principle request is for a meal.      Back Pain   This is a chronic problem. The current episode started two days ago. The problem occurs every few hours. The problem has been waxing and waning. The pain is associated with no known injury. The pain is present in the lumbar spine. The quality of the pain is described as aching. The pain does not radiate. The pain is at a severity of 2/10. The symptoms are aggravated by bending and twisting. Pertinent negatives include no chest pain, no fever, no numbness, no weight loss, no headaches, no abdominal pain, no abdominal swelling, no bowel incontinence and no perianal numbness. Treatments tried: prescribed tramadol yesterday; hasn't yet taken the medication (did fill it)     Review of patient's allergies indicates:  No Known Allergies  Past Medical History:   Diagnosis Date    Arthritis     Atrial fibrillation     BPH (benign prostatic hyperplasia)     Cardiac arrest     Coronary artery disease     Cyst, kidney, acquired     Diverticulosis     Hyperlipidemia     Hypertension     MI (myocardial infarction)     Obesity     Steatosis of liver      Past Surgical History:   Procedure Laterality Date    A-V CARDIAC PACEMAKER INSERTION Right     CARDIAC CATHETERIZATION      COLONOSCOPY W/ BIOPSIES      excision of colon       Family History   Problem Relation Age of Onset    Hypertension Mother     Hypertension Father     Hypertension Sister      Social History     Tobacco Use    Smoking  status: Former     Current packs/day: 0.00    Smokeless tobacco: Never   Substance Use Topics    Alcohol use: Not Currently    Drug use: Not Currently     Review of Systems   Constitutional:  Negative for fever and weight loss.   Cardiovascular:  Negative for chest pain.   Gastrointestinal:  Negative for abdominal pain and bowel incontinence.   Musculoskeletal:  Positive for back pain.   Neurological:  Negative for numbness and headaches.   All other systems reviewed and are negative.      Physical Exam     Initial Vitals [08/19/23 1131]   BP Pulse Resp Temp SpO2   (!) 151/87 99 20 97.2 °F (36.2 °C) 98 %      MAP       --         Physical Exam    Nursing note and vitals reviewed.  Constitutional: He appears well-developed and well-nourished. He is not diaphoretic. No distress.   HENT:   Head: Normocephalic and atraumatic.   Eyes: EOM are normal. Pupils are equal, round, and reactive to light. Right eye exhibits no discharge. Left eye exhibits no discharge.   Neck: Neck supple. No thyromegaly present. No tracheal deviation present. No JVD present.   Normal range of motion.  Cardiovascular:  Normal rate, regular rhythm, normal heart sounds and intact distal pulses.           No murmur heard.  Pulmonary/Chest: Breath sounds normal. No stridor. No respiratory distress. He has no wheezes. He has no rhonchi. He has no rales.   Abdominal: Abdomen is soft. He exhibits no distension. There is no abdominal tenderness. There is no rebound and no guarding.   Musculoskeletal:         General: Edema present. No tenderness. Normal range of motion.      Cervical back: Normal range of motion and neck supple.     Neurological: He is alert and oriented to person, place, and time. He has normal strength. No cranial nerve deficit. GCS score is 15. GCS eye subscore is 4. GCS verbal subscore is 5. GCS motor subscore is 6.   Skin: Skin is warm and dry. Capillary refill takes less than 2 seconds. No rash and no abscess noted. No erythema.  No pallor.   Psychiatric: He has a normal mood and affect. His behavior is normal. Judgment and thought content normal.         ED Course   Procedures  Labs Reviewed   COMPREHENSIVE METABOLIC PANEL - Abnormal; Notable for the following components:       Result Value    Potassium Level 3.4 (*)     Globulin 3.7 (*)     Albumin/Globulin Ratio 1.0 (*)     Bilirubin Total 2.0 (*)     All other components within normal limits   CK - Abnormal; Notable for the following components:    Creatine Kinase 239 (*)     All other components within normal limits   B-TYPE NATRIURETIC PEPTIDE - Abnormal; Notable for the following components:    Natriuretic Peptide 368.7 (*)     All other components within normal limits   URINALYSIS, REFLEX TO URINE CULTURE - Abnormal; Notable for the following components:    Protein, UA 1+ (*)     Blood, UA Trace (*)     Leukocyte Esterase,  (*)     WBC, UA 6-10 (*)     Bacteria, UA Trace (*)     Squamous Epithelial Cells, UA Trace (*)     Mucous, UA Trace (*)     All other components within normal limits   CBC WITH DIFFERENTIAL - Abnormal; Notable for the following components:    RBC 4.44 (*)     Hgb 13.3 (*)     Hct 40.2 (*)     All other components within normal limits   LIPASE - Normal   MAGNESIUM - Normal   CK-MB - Normal   TROPONIN I - Normal   COVID/FLU A&B PCR - Normal    Narrative:     The Xpert Xpress SARS-CoV-2/FLU/RSV plus is a rapid, multiplexed real-time PCR test intended for the simultaneous qualitative detection and differentiation of SARS-CoV-2, Influenza A, Influenza B, and respiratory syncytial virus (RSV) viral RNA in either nasopharyngeal swab or nasal swab specimens.         ALCOHOL,MEDICAL (ETHANOL) - Normal   LACTIC ACID, PLASMA - Normal   DRUG SCREEN, URINE (BEAKER) - Normal    Narrative:     Cut off concentrations:    Amphetamines - 1000 ng/ml  Barbiturates - 200 ng/ml  Benzodiazepine - 200 ng/ml  Cannabinoids (THC) - 50 ng/ml  Cocaine - 300 ng/ml  Fentanyl - 1.0  ng/ml  MDMA - 500 ng/ml  Opiates - 300 ng/ml   Phencyclidine (PCP) - 25 ng/ml    Specimen submitted for drug analysis and tested for pH and specific gravity in order to evaluate sample integrity. Suspect tampering if specific gravity is <1.003 and/or pH is not within the range of 4.5 - 8.0  False negatives may result form substances such as bleach added to urine.  False positives may result for the presence of a substance with similar chemical structure to the drug or its metabolite.    This test provides only a PRELIMINARY analytical test result. A more specific alternate chemical method must be used in order to obtain a confirmed analytical result. Gas chromatography/mass spectrometry (GC/MS) is the preferred confirmatory method. Other chemical confirmation methods are available. Clinical consideration and professional judgement should be applied to any drug of abuse test result, particularly when preliminary positive results are used.    Positive results will be confirmed only at the physicians request. Unconfirmed screening results are to be used only for medical purposes (treatment).        CBC W/ AUTO DIFFERENTIAL    Narrative:     The following orders were created for panel order CBC auto differential.  Procedure                               Abnormality         Status                     ---------                               -----------         ------                     CBC with Differential[[id number]]        Abnormal            Final result                 Please view results for these tests on the individual orders.   EXTRA TUBES    Narrative:     The following orders were created for panel order EXTRA TUBES.  Procedure                               Abnormality         Status                     ---------                               -----------         ------                     Light Blue Top Hold[[id number]]                              In process                 Gold Top Hold[[id number]]                                     In process                 Pink Top Hold[[id number]]                                    In process                   Please view results for these tests on the individual orders.   LIGHT BLUE TOP HOLD   GOLD TOP HOLD   PINK TOP HOLD          Imaging Results              X-Ray Chest AP Portable (Final result)  Result time 08/19/23 13:15:59      Final result by Isaac Carcmao MD (08/19/23 13:15:59)                   Narrative:    EXAMINATION  XR CHEST AP PORTABLE    CLINICAL HISTORY  Encounter for screening, unspecified    TECHNIQUE  A total of 1 frontal image(s) submitted of the chest.    COMPARISON  14 August 2023    FINDINGS  Lines/tubes/devices: Pacemaker/ICD components are unchanged.    There is similar enlargement of the cardiac silhouette.  The trachea is midline. No new or worsening consolidation is developed in the interval.  There is no large pleural effusion or convincing pneumothorax.    Regional osseous structures and extrathoracic soft tissues are similar.    IMPRESSION  No acute process or other adverse interval change.      Electronically signed by: Isaac Carcamo  Date:    08/19/2023  Time:    13:15                                  X-Rays:   Independently Interpreted Readings:   Chest X-Ray: Cxr without acute abnormal findings ;     Medications - No data to display  Medical Decision Making  Patient presents once again today continuing to complain of low back pain unchanged from previous.  Patient also complaining of lower extremity edema, reportedly unchanged patient was discharged from the hospital 3 days ago for CHF exacerbation, after inpatient optimization of outpatient meds.  At the conclusion of yesterday's visit, patient's objective data found reassuring, was prescribed tramadol and discharged home.  Patient did fill the tramadol, but has not yet taken any doses, still endorsing pain.    Amount and/or Complexity of Data Reviewed  External Data Reviewed: labs, radiology, ECG  and notes.     Details: As above  Labs: ordered. Decision-making details documented in ED Course.  Radiology: ordered. Decision-making details documented in ED Course.    Risk  Prescription drug management.  Diagnosis or treatment significantly limited by social determinants of health.  Risk Details: Significant underlying comorbid health conditions, and accompanying poor compliance necessarily raised risk.  We did opt to obtain an expanded evaluation with objective data today in order to diminish probability an emergent process could remain occult.  The objective data have subsequently resulted in found unchanged in comparison with patient's baseline.  Patient today principally complaining of hunger, requesting meal.  He does feel improved after eating.  He is still endorsing low back pain, having not yet taken any of the medicines prescribed yesterday.  We plan to discharge the patient with emphasis on compliance with outpatient medicines as prescribed, anticipatory guidance, return precautions, follow-up instructions.  Discharged in stable condition without event.               ED Course as of 08/19/23 1410   Sat Aug 19, 2023   1354 Utox negative ; [CT]   1354 Respiratory pathogen negative by pcr ; [CT]   1354 Contaminated ua, unconvincing as uti ; [CT]   1355 Reassuring hemogram ; [CT]   1355 Normal chemistries ; [CT]   1355 Normal troponin ; [CT]   1355 Negative ethanol level ; [CT]   1355 Normal total ck, negative mb fraction ; [CT]      ED Course User Index  [CT] Luis Hoyos MD                    Clinical Impression:   Final diagnoses:  [Z13.9] Screening due  [M54.9] Back pain, unspecified back location, unspecified back pain laterality, unspecified chronicity (Primary)  [R60.0] Lower extremity edema        ED Disposition Condition    Discharge Stable          ED Prescriptions    None       Follow-up Information       Follow up With Specialties Details Why Contact Info    [facility] -  Emergency Dept Emergency Medicine  As needed, If symptoms worsen [address] [facility] [id number] [phone]             Theodefrain, Luis LEVIN MD  08/19/23 9201

## 2023-08-22 ENCOUNTER — CLINICAL SUPPORT (OUTPATIENT)
Dept: CARDIOLOGY | Facility: CLINIC | Age: 68
End: 2023-08-22
Payer: MEDICARE

## 2023-08-22 VITALS
SYSTOLIC BLOOD PRESSURE: 144 MMHG | OXYGEN SATURATION: 95 % | RESPIRATION RATE: 20 BRPM | DIASTOLIC BLOOD PRESSURE: 94 MMHG | HEART RATE: 73 BPM

## 2023-08-22 DIAGNOSIS — I10 HYPERTENSION, UNSPECIFIED TYPE: Primary | ICD-10-CM

## 2023-08-22 PROCEDURE — 99213 OFFICE O/P EST LOW 20 MIN: CPT | Mod: PBBFAC

## 2023-08-22 NOTE — PROGRESS NOTES
Mr. Daniel attends nurse visit. He brought a very large bag of medications that many bottles are double and triple of the same medications.   Many of the bottles are still full. He states that he takes his medication. He has a Hx of noncompliance and has been counseled on numerous  occasions. Today's B/P is 139/93, HR 73, manual B/P is 144/94. Presented to KAVIN Sheridan. Instructions are to continue current   Medications, review importance of compliance, keep all future appointments. Discussed compliance Hx. And importance of being honest   to medical persons so that he will have appropriate care for his conditions.  He verbalizes understanding and agrees.

## 2023-08-23 ENCOUNTER — HOSPITAL ENCOUNTER (OUTPATIENT)
Dept: RADIOLOGY | Facility: HOSPITAL | Age: 68
Discharge: HOME | End: 2023-08-23
Attending: INTERNAL MEDICINE
Payer: MEDICARE

## 2023-08-23 DIAGNOSIS — R91.1 NODULE OF LEFT LUNG: ICD-10-CM

## 2023-08-23 DIAGNOSIS — C7A.8 PRIMARY NEUROENDOCRINE CARCINOMA OF COLON: ICD-10-CM

## 2023-08-23 DIAGNOSIS — C7A.8 NEUROENDOCRINE CARCINOMA OF SMALL BOWEL: ICD-10-CM

## 2023-08-23 PROCEDURE — 71260 CT THORAX DX C+: CPT | Mod: TC

## 2023-08-23 PROCEDURE — 74177 CT ABD & PELVIS W/CONTRAST: CPT | Mod: TC

## 2023-08-23 PROCEDURE — 25500020 PHARM REV CODE 255

## 2023-08-23 RX ADMIN — IOHEXOL 100 ML: 350 INJECTION, SOLUTION INTRAVENOUS at 07:08

## 2023-08-26 ENCOUNTER — HOSPITAL ENCOUNTER (OUTPATIENT)
Facility: HOSPITAL | Age: 68
Discharge: HOME | End: 2023-08-28
Attending: EMERGENCY MEDICINE | Admitting: INTERNAL MEDICINE
Payer: MEDICARE

## 2023-08-26 DIAGNOSIS — R53.1 ASTHENIA: ICD-10-CM

## 2023-08-26 DIAGNOSIS — I48.91 ATRIAL FIBRILLATION WITH RVR: ICD-10-CM

## 2023-08-26 DIAGNOSIS — I48.91 ATRIAL FIBRILLATION WITH RAPID VENTRICULAR RESPONSE: Primary | ICD-10-CM

## 2023-08-26 DIAGNOSIS — I48.91 A-FIB: ICD-10-CM

## 2023-08-26 LAB
ALBUMIN SERPL-MCNC: 3.6 G/DL (ref 3.4–4.8)
ALBUMIN/GLOB SERPL: 1 RATIO (ref 1.1–2)
ALP SERPL-CCNC: 57 UNIT/L (ref 40–150)
ALT SERPL-CCNC: 26 UNIT/L (ref 0–55)
AMPHET UR QL SCN: NEGATIVE
APPEARANCE UR: CLEAR
AST SERPL-CCNC: 21 UNIT/L (ref 5–34)
BACTERIA #/AREA URNS AUTO: ABNORMAL /HPF
BARBITURATE SCN PRESENT UR: NEGATIVE
BASOPHILS # BLD AUTO: 0.07 X10(3)/MCL
BASOPHILS NFR BLD AUTO: 1.1 %
BENZODIAZ UR QL SCN: NEGATIVE
BILIRUB SERPL-MCNC: 0.9 MG/DL
BILIRUB UR QL STRIP.AUTO: NEGATIVE
BNP BLD-MCNC: 423.1 PG/ML
BUN SERPL-MCNC: 10.8 MG/DL (ref 8.4–25.7)
CALCIUM SERPL-MCNC: 9.2 MG/DL (ref 8.8–10)
CANNABINOIDS UR QL SCN: NEGATIVE
CHLORIDE SERPL-SCNC: 108 MMOL/L (ref 98–107)
CK MB SERPL-MCNC: 3.1 NG/ML
CK SERPL-CCNC: 246 U/L (ref 30–200)
CO2 SERPL-SCNC: 25 MMOL/L (ref 23–31)
COCAINE UR QL SCN: NEGATIVE
COLOR UR: COLORLESS
CREAT SERPL-MCNC: 1.14 MG/DL (ref 0.73–1.18)
EOSINOPHIL # BLD AUTO: 0.14 X10(3)/MCL (ref 0–0.9)
EOSINOPHIL NFR BLD AUTO: 2.2 %
ERYTHROCYTE [DISTWIDTH] IN BLOOD BY AUTOMATED COUNT: 14.2 % (ref 11.5–17)
FENTANYL UR QL SCN: NEGATIVE
FLUAV AG UPPER RESP QL IA.RAPID: NOT DETECTED
FLUBV AG UPPER RESP QL IA.RAPID: NOT DETECTED
GFR SERPLBLD CREATININE-BSD FMLA CKD-EPI: >60 MLS/MIN/1.73/M2
GLOBULIN SER-MCNC: 3.6 GM/DL (ref 2.4–3.5)
GLUCOSE SERPL-MCNC: 95 MG/DL (ref 82–115)
GLUCOSE UR QL STRIP.AUTO: NORMAL
HCT VFR BLD AUTO: 40.1 % (ref 42–52)
HGB BLD-MCNC: 13.6 G/DL (ref 14–18)
HYALINE CASTS #/AREA URNS LPF: ABNORMAL /LPF
IMM GRANULOCYTES # BLD AUTO: 0.05 X10(3)/MCL (ref 0–0.04)
IMM GRANULOCYTES NFR BLD AUTO: 0.8 %
KETONES UR QL STRIP.AUTO: NEGATIVE
LACTATE SERPL-SCNC: 1.5 MMOL/L (ref 0.5–2.2)
LEUKOCYTE ESTERASE UR QL STRIP.AUTO: 75
LIPASE SERPL-CCNC: 28 U/L
LYMPHOCYTES # BLD AUTO: 1.92 X10(3)/MCL (ref 0.6–4.6)
LYMPHOCYTES NFR BLD AUTO: 30.2 %
MAGNESIUM SERPL-MCNC: 1.9 MG/DL (ref 1.6–2.6)
MCH RBC QN AUTO: 30.5 PG (ref 27–31)
MCHC RBC AUTO-ENTMCNC: 33.9 G/DL (ref 33–36)
MCV RBC AUTO: 89.9 FL (ref 80–94)
MDMA UR QL SCN: NEGATIVE
MONOCYTES # BLD AUTO: 0.64 X10(3)/MCL (ref 0.1–1.3)
MONOCYTES NFR BLD AUTO: 10.1 %
NEUTROPHILS # BLD AUTO: 3.53 X10(3)/MCL (ref 2.1–9.2)
NEUTROPHILS NFR BLD AUTO: 55.6 %
NITRITE UR QL STRIP.AUTO: NEGATIVE
NRBC BLD AUTO-RTO: 0 %
OPIATES UR QL SCN: NEGATIVE
PCP UR QL: NEGATIVE
PH UR STRIP.AUTO: 5.5 [PH]
PH UR: 5.5 [PH] (ref 3–11)
PLATELET # BLD AUTO: 251 X10(3)/MCL (ref 130–400)
PMV BLD AUTO: 9.5 FL (ref 7.4–10.4)
POTASSIUM SERPL-SCNC: 3.4 MMOL/L (ref 3.5–5.1)
PROT SERPL-MCNC: 7.2 GM/DL (ref 5.8–7.6)
PROT UR QL STRIP.AUTO: ABNORMAL
RBC # BLD AUTO: 4.46 X10(6)/MCL (ref 4.7–6.1)
RBC #/AREA URNS AUTO: ABNORMAL /HPF
RBC UR QL AUTO: ABNORMAL
SARS-COV-2 RNA RESP QL NAA+PROBE: NOT DETECTED
SODIUM SERPL-SCNC: 142 MMOL/L (ref 136–145)
SP GR UR STRIP.AUTO: 1.01
SQUAMOUS #/AREA URNS LPF: ABNORMAL /HPF
TROPONIN I SERPL-MCNC: 0.01 NG/ML (ref 0–0.04)
UROBILINOGEN UR STRIP-ACNC: NORMAL
WBC # SPEC AUTO: 6.35 X10(3)/MCL (ref 4.5–11.5)
WBC #/AREA URNS AUTO: ABNORMAL /HPF

## 2023-08-26 PROCEDURE — 25000003 PHARM REV CODE 250: Performed by: EMERGENCY MEDICINE

## 2023-08-26 PROCEDURE — 85025 COMPLETE CBC W/AUTO DIFF WBC: CPT | Performed by: EMERGENCY MEDICINE

## 2023-08-26 PROCEDURE — 96375 TX/PRO/DX INJ NEW DRUG ADDON: CPT

## 2023-08-26 PROCEDURE — 82550 ASSAY OF CK (CPK): CPT | Performed by: EMERGENCY MEDICINE

## 2023-08-26 PROCEDURE — 93005 ELECTROCARDIOGRAM TRACING: CPT

## 2023-08-26 PROCEDURE — 96374 THER/PROPH/DIAG INJ IV PUSH: CPT

## 2023-08-26 PROCEDURE — 96376 TX/PRO/DX INJ SAME DRUG ADON: CPT

## 2023-08-26 PROCEDURE — G0378 HOSPITAL OBSERVATION PER HR: HCPCS

## 2023-08-26 PROCEDURE — 84484 ASSAY OF TROPONIN QUANT: CPT | Performed by: EMERGENCY MEDICINE

## 2023-08-26 PROCEDURE — 0240U COVID/FLU A&B PCR: CPT | Performed by: EMERGENCY MEDICINE

## 2023-08-26 PROCEDURE — 25000003 PHARM REV CODE 250: Performed by: STUDENT IN AN ORGANIZED HEALTH CARE EDUCATION/TRAINING PROGRAM

## 2023-08-26 PROCEDURE — 83690 ASSAY OF LIPASE: CPT | Performed by: EMERGENCY MEDICINE

## 2023-08-26 PROCEDURE — 83735 ASSAY OF MAGNESIUM: CPT | Performed by: EMERGENCY MEDICINE

## 2023-08-26 PROCEDURE — 83605 ASSAY OF LACTIC ACID: CPT | Performed by: EMERGENCY MEDICINE

## 2023-08-26 PROCEDURE — 81001 URINALYSIS AUTO W/SCOPE: CPT | Performed by: EMERGENCY MEDICINE

## 2023-08-26 PROCEDURE — 83880 ASSAY OF NATRIURETIC PEPTIDE: CPT | Performed by: EMERGENCY MEDICINE

## 2023-08-26 PROCEDURE — 63600175 PHARM REV CODE 636 W HCPCS: Performed by: STUDENT IN AN ORGANIZED HEALTH CARE EDUCATION/TRAINING PROGRAM

## 2023-08-26 PROCEDURE — 99285 EMERGENCY DEPT VISIT HI MDM: CPT | Mod: 25

## 2023-08-26 PROCEDURE — 80053 COMPREHEN METABOLIC PANEL: CPT | Performed by: EMERGENCY MEDICINE

## 2023-08-26 PROCEDURE — 82553 CREATINE MB FRACTION: CPT | Performed by: EMERGENCY MEDICINE

## 2023-08-26 PROCEDURE — 80307 DRUG TEST PRSMV CHEM ANLYZR: CPT | Performed by: EMERGENCY MEDICINE

## 2023-08-26 RX ORDER — POTASSIUM CHLORIDE 20 MEQ/1
40 TABLET, EXTENDED RELEASE ORAL EVERY 4 HOURS
Status: COMPLETED | OUTPATIENT
Start: 2023-08-26 | End: 2023-08-27

## 2023-08-26 RX ORDER — HYDRALAZINE HYDROCHLORIDE 20 MG/ML
10 INJECTION INTRAMUSCULAR; INTRAVENOUS ONCE
Status: DISCONTINUED | OUTPATIENT
Start: 2023-08-26 | End: 2023-08-26

## 2023-08-26 RX ORDER — NAPROXEN SODIUM 220 MG/1
81 TABLET, FILM COATED ORAL DAILY
Status: DISCONTINUED | OUTPATIENT
Start: 2023-08-27 | End: 2023-08-28 | Stop reason: HOSPADM

## 2023-08-26 RX ORDER — DILTIAZEM HYDROCHLORIDE 5 MG/ML
10 INJECTION INTRAVENOUS
Status: COMPLETED | OUTPATIENT
Start: 2023-08-26 | End: 2023-08-26

## 2023-08-26 RX ORDER — HYDRALAZINE HYDROCHLORIDE 20 MG/ML
10 INJECTION INTRAMUSCULAR; INTRAVENOUS EVERY 6 HOURS PRN
Status: DISCONTINUED | OUTPATIENT
Start: 2023-08-27 | End: 2023-08-28 | Stop reason: HOSPADM

## 2023-08-26 RX ORDER — DILTIAZEM HYDROCHLORIDE 5 MG/ML
20 INJECTION INTRAVENOUS
Status: COMPLETED | OUTPATIENT
Start: 2023-08-26 | End: 2023-08-26

## 2023-08-26 RX ORDER — ASPIRIN 325 MG
325 TABLET ORAL
Status: COMPLETED | OUTPATIENT
Start: 2023-08-26 | End: 2023-08-26

## 2023-08-26 RX ORDER — LABETALOL HCL 20 MG/4 ML
10 SYRINGE (ML) INTRAVENOUS ONCE
Status: DISCONTINUED | OUTPATIENT
Start: 2023-08-26 | End: 2023-08-26

## 2023-08-26 RX ORDER — LOSARTAN POTASSIUM 25 MG/1
50 TABLET ORAL DAILY
Status: DISCONTINUED | OUTPATIENT
Start: 2023-08-26 | End: 2023-08-28 | Stop reason: HOSPADM

## 2023-08-26 RX ORDER — METOPROLOL SUCCINATE 50 MG/1
200 TABLET, EXTENDED RELEASE ORAL 2 TIMES DAILY
Status: DISCONTINUED | OUTPATIENT
Start: 2023-08-26 | End: 2023-08-28 | Stop reason: HOSPADM

## 2023-08-26 RX ORDER — METOPROLOL SUCCINATE 50 MG/1
200 TABLET, EXTENDED RELEASE ORAL 2 TIMES DAILY
Status: DISCONTINUED | OUTPATIENT
Start: 2023-08-26 | End: 2023-08-26

## 2023-08-26 RX ORDER — METOPROLOL SUCCINATE 50 MG/1
200 TABLET, EXTENDED RELEASE ORAL 2 TIMES DAILY
Status: DISCONTINUED | OUTPATIENT
Start: 2023-08-27 | End: 2023-08-26

## 2023-08-26 RX ORDER — FUROSEMIDE 10 MG/ML
40 INJECTION INTRAMUSCULAR; INTRAVENOUS ONCE
Status: DISCONTINUED | OUTPATIENT
Start: 2023-08-26 | End: 2023-08-26

## 2023-08-26 RX ORDER — FUROSEMIDE 10 MG/ML
40 INJECTION INTRAMUSCULAR; INTRAVENOUS ONCE
Status: COMPLETED | OUTPATIENT
Start: 2023-08-27 | End: 2023-08-27

## 2023-08-26 RX ORDER — FUROSEMIDE 10 MG/ML
40 INJECTION INTRAMUSCULAR; INTRAVENOUS ONCE
Status: COMPLETED | OUTPATIENT
Start: 2023-08-26 | End: 2023-08-26

## 2023-08-26 RX ORDER — TALC
6 POWDER (GRAM) TOPICAL NIGHTLY PRN
Status: DISCONTINUED | OUTPATIENT
Start: 2023-08-26 | End: 2023-08-28 | Stop reason: HOSPADM

## 2023-08-26 RX ORDER — HYDRALAZINE HYDROCHLORIDE 20 MG/ML
10 INJECTION INTRAMUSCULAR; INTRAVENOUS EVERY 6 HOURS PRN
Status: CANCELLED | OUTPATIENT
Start: 2023-08-26

## 2023-08-26 RX ORDER — SODIUM CHLORIDE 0.9 % (FLUSH) 0.9 %
10 SYRINGE (ML) INJECTION
Status: DISCONTINUED | OUTPATIENT
Start: 2023-08-26 | End: 2023-08-28 | Stop reason: HOSPADM

## 2023-08-26 RX ORDER — SPIRONOLACTONE 25 MG/1
50 TABLET ORAL DAILY
Status: DISCONTINUED | OUTPATIENT
Start: 2023-08-26 | End: 2023-08-28 | Stop reason: HOSPADM

## 2023-08-26 RX ORDER — DILTIAZEM HYDROCHLORIDE 180 MG/1
360 CAPSULE, COATED, EXTENDED RELEASE ORAL DAILY
Status: DISCONTINUED | OUTPATIENT
Start: 2023-08-27 | End: 2023-08-28 | Stop reason: HOSPADM

## 2023-08-26 RX ORDER — ATORVASTATIN CALCIUM 40 MG/1
40 TABLET, FILM COATED ORAL DAILY
Status: DISCONTINUED | OUTPATIENT
Start: 2023-08-27 | End: 2023-08-28 | Stop reason: HOSPADM

## 2023-08-26 RX ADMIN — METOPROLOL SUCCINATE 200 MG: 50 TABLET, FILM COATED, EXTENDED RELEASE ORAL at 10:08

## 2023-08-26 RX ADMIN — FUROSEMIDE 40 MG: 10 INJECTION, SOLUTION INTRAMUSCULAR; INTRAVENOUS at 09:08

## 2023-08-26 RX ADMIN — HYDRALAZINE HYDROCHLORIDE 10 MG: 20 INJECTION INTRAMUSCULAR; INTRAVENOUS at 10:08

## 2023-08-26 RX ADMIN — SPIRONOLACTONE 50 MG: 25 TABLET ORAL at 09:08

## 2023-08-26 RX ADMIN — DILTIAZEM HYDROCHLORIDE 10 MG: 5 INJECTION, SOLUTION INTRAVENOUS at 07:08

## 2023-08-26 RX ADMIN — ASPIRIN 325 MG ORAL TABLET 325 MG: 325 PILL ORAL at 08:08

## 2023-08-26 RX ADMIN — POTASSIUM CHLORIDE 40 MEQ: 1500 TABLET, EXTENDED RELEASE ORAL at 09:08

## 2023-08-26 RX ADMIN — DILTIAZEM HYDROCHLORIDE 20 MG: 5 INJECTION INTRAVENOUS at 08:08

## 2023-08-26 RX ADMIN — LOSARTAN POTASSIUM 50 MG: 25 TABLET, FILM COATED ORAL at 09:08

## 2023-08-27 LAB
ALBUMIN SERPL-MCNC: 3.5 G/DL (ref 3.4–4.8)
ALBUMIN/GLOB SERPL: 1 RATIO (ref 1.1–2)
ALP SERPL-CCNC: 58 UNIT/L (ref 40–150)
ALT SERPL-CCNC: 24 UNIT/L (ref 0–55)
AST SERPL-CCNC: 18 UNIT/L (ref 5–34)
BASOPHILS # BLD AUTO: 0.08 X10(3)/MCL
BASOPHILS NFR BLD AUTO: 1.3 %
BILIRUB SERPL-MCNC: 0.8 MG/DL
BUN SERPL-MCNC: 9.9 MG/DL (ref 8.4–25.7)
CALCIUM SERPL-MCNC: 9.4 MG/DL (ref 8.8–10)
CHLORIDE SERPL-SCNC: 107 MMOL/L (ref 98–107)
CO2 SERPL-SCNC: 25 MMOL/L (ref 23–31)
CREAT SERPL-MCNC: 0.99 MG/DL (ref 0.73–1.18)
EOSINOPHIL # BLD AUTO: 0.2 X10(3)/MCL (ref 0–0.9)
EOSINOPHIL NFR BLD AUTO: 3.2 %
ERYTHROCYTE [DISTWIDTH] IN BLOOD BY AUTOMATED COUNT: 14 % (ref 11.5–17)
GFR SERPLBLD CREATININE-BSD FMLA CKD-EPI: >60 MLS/MIN/1.73/M2
GLOBULIN SER-MCNC: 3.6 GM/DL (ref 2.4–3.5)
GLUCOSE SERPL-MCNC: 92 MG/DL (ref 82–115)
HCT VFR BLD AUTO: 42.9 % (ref 42–52)
HGB BLD-MCNC: 13.9 G/DL (ref 14–18)
IMM GRANULOCYTES # BLD AUTO: 0.06 X10(3)/MCL (ref 0–0.04)
IMM GRANULOCYTES NFR BLD AUTO: 1 %
LYMPHOCYTES # BLD AUTO: 1.92 X10(3)/MCL (ref 0.6–4.6)
LYMPHOCYTES NFR BLD AUTO: 30.6 %
MAGNESIUM SERPL-MCNC: 2 MG/DL (ref 1.6–2.6)
MCH RBC QN AUTO: 29 PG (ref 27–31)
MCHC RBC AUTO-ENTMCNC: 32.4 G/DL (ref 33–36)
MCV RBC AUTO: 89.4 FL (ref 80–94)
MONOCYTES # BLD AUTO: 0.79 X10(3)/MCL (ref 0.1–1.3)
MONOCYTES NFR BLD AUTO: 12.6 %
NEUTROPHILS # BLD AUTO: 3.23 X10(3)/MCL (ref 2.1–9.2)
NEUTROPHILS NFR BLD AUTO: 51.3 %
NRBC BLD AUTO-RTO: 0 %
PLATELET # BLD AUTO: 237 X10(3)/MCL (ref 130–400)
PMV BLD AUTO: 10.8 FL (ref 7.4–10.4)
POTASSIUM SERPL-SCNC: 3.8 MMOL/L (ref 3.5–5.1)
PROT SERPL-MCNC: 7.1 GM/DL (ref 5.8–7.6)
RBC # BLD AUTO: 4.8 X10(6)/MCL (ref 4.7–6.1)
SODIUM SERPL-SCNC: 141 MMOL/L (ref 136–145)
WBC # SPEC AUTO: 6.28 X10(3)/MCL (ref 4.5–11.5)

## 2023-08-27 PROCEDURE — 80053 COMPREHEN METABOLIC PANEL: CPT | Performed by: STUDENT IN AN ORGANIZED HEALTH CARE EDUCATION/TRAINING PROGRAM

## 2023-08-27 PROCEDURE — 63600175 PHARM REV CODE 636 W HCPCS: Performed by: STUDENT IN AN ORGANIZED HEALTH CARE EDUCATION/TRAINING PROGRAM

## 2023-08-27 PROCEDURE — 83735 ASSAY OF MAGNESIUM: CPT | Performed by: STUDENT IN AN ORGANIZED HEALTH CARE EDUCATION/TRAINING PROGRAM

## 2023-08-27 PROCEDURE — 96376 TX/PRO/DX INJ SAME DRUG ADON: CPT

## 2023-08-27 PROCEDURE — 94761 N-INVAS EAR/PLS OXIMETRY MLT: CPT

## 2023-08-27 PROCEDURE — G0378 HOSPITAL OBSERVATION PER HR: HCPCS

## 2023-08-27 PROCEDURE — 85025 COMPLETE CBC W/AUTO DIFF WBC: CPT | Performed by: STUDENT IN AN ORGANIZED HEALTH CARE EDUCATION/TRAINING PROGRAM

## 2023-08-27 PROCEDURE — 25000003 PHARM REV CODE 250: Performed by: STUDENT IN AN ORGANIZED HEALTH CARE EDUCATION/TRAINING PROGRAM

## 2023-08-27 RX ADMIN — ASPIRIN 81 MG CHEWABLE TABLET 81 MG: 81 TABLET CHEWABLE at 08:08

## 2023-08-27 RX ADMIN — METOPROLOL SUCCINATE 200 MG: 50 TABLET, FILM COATED, EXTENDED RELEASE ORAL at 08:08

## 2023-08-27 RX ADMIN — LOSARTAN POTASSIUM 50 MG: 25 TABLET, FILM COATED ORAL at 08:08

## 2023-08-27 RX ADMIN — DILTIAZEM HYDROCHLORIDE 360 MG: 180 CAPSULE, COATED, EXTENDED RELEASE ORAL at 08:08

## 2023-08-27 RX ADMIN — FUROSEMIDE 40 MG: 10 INJECTION, SOLUTION INTRAMUSCULAR; INTRAVENOUS at 08:08

## 2023-08-27 RX ADMIN — SPIRONOLACTONE 50 MG: 25 TABLET ORAL at 08:08

## 2023-08-27 RX ADMIN — POTASSIUM CHLORIDE 40 MEQ: 1500 TABLET, EXTENDED RELEASE ORAL at 01:08

## 2023-08-27 RX ADMIN — ATORVASTATIN CALCIUM 40 MG: 40 TABLET, FILM COATED ORAL at 08:08

## 2023-08-27 RX ADMIN — RIVAROXABAN 20 MG: 10 TABLET, FILM COATED ORAL at 05:08

## 2023-08-27 NOTE — H&P
[facility] INTERNAL MEDICINE  ADMISSION HISTORY AND PHYSICAL    Resident Team: [facility] Medicine List 2  Attending Physician: Love Fonseca MD  Date of Admit: 8/26/2023    SUBJECTIVE:      HPI: Delio Daniel Jr. is a 68 y.o. male with PMH of A-fib (on xarelto), HTN, HLD, history of cardiac arrest and MI, HFpEF (EF 55-60% 8/15/2023), presented to the ED on 8/26/2023 with a CC of swelling in lower extremities that started approximately 2 weeks ago and worsened this morning; he states that nothing makes the swelling better or worse and he also notices that his shoes and clothes are getting tighter. Other associating symptom includes increased dyspnea on exertion and reduced endurance. He was last hospitalized on 8/14/2023 for CHF exacerbation needing diuresis. He states that his diet is poor in general and includes high sodium intake and fried foods on a daily basis. In the ED: /133; H/H stable, K+ 3.4, , , trop negative. Per ED provider, HR range was in the 120-130's, achieved rate control after 1 dose of diltiazem IV. Hospital medicine was consulted for A-fib RVR and HTN urgency.     ROS:   (+) Lower extremity edema, increased dyspnea on exertion, reduced endurance  (-) Chest pain, palpitations, fever, night sweat, chills, diarrhea, constipation    PMH: As stated above  Family HX: Mother (CHF, stroke); father (stroke)  Allergy: NKDA  Social HX: 1 PPD x10+ years, quit in 1980's; denies alcohol and illicit drug use  Previous hosp: 8/14/2023 for lower extremity edema  Home meds:   Current Outpatient Medications   Medication Instructions    albuterol (PROVENTIL/VENTOLIN HFA) 90 mcg/actuation inhaler 2 puffs, Inhalation, Every 6 hours PRN, Rescue    aspirin 81 MG Chew chew and swallow 1 tablet by mouth daily    atorvastatin (LIPITOR) 40 mg, Oral, Daily    cetirizine (ZYRTEC) 10 mg, Oral, Daily    diltiaZEM (CARDIZEM CD) 360 mg, Oral, Daily    famotidine (PEPCID) 20 mg, Oral, 2 times daily    hyoscyamine  "(ANASPAZ,LEVSIN) 125 mcg, Oral, Every 6 hours PRN    LIDOcaine (LIDODERM) 5 % 1 patch, Transdermal, Daily, Remove & Discard patch within 12 hours or as directed by MD    losartan (COZAAR) 50 mg, Oral, Daily    methocarbamoL (ROBAXIN) 750 mg, Oral, 3 times daily    metoprolol succinate (TOPROL-XL) 200 mg, Oral, 2 times daily    ondansetron (ZOFRAN-ODT) 4 mg, Oral, Every 12 hours PRN    spironolactone (ALDACTONE) 50 mg, Oral, Daily    traMADoL (ULTRAM) 50 mg, Oral, Every 6 hours PRN    vitamin D (VITAMIN D3) 1,000 Units, Oral, Daily    XARELTO 20 mg Tab TAKE 1 TABLET BY MOUTH DAILY with SUPPER         OBJECTIVE:     Vital signs:   BP (!) 175/114   Pulse 83   Temp 97.6 °F (36.4 °C) (Oral)   Resp 18   Ht 5' 9" (1.753 m)   Wt 110.7 kg (244 lb)   SpO2 100%   BMI 36.03 kg/m²      Physical Examination:  General: Obese w/o distress  HEENT: NC/AT; PERRL; nasal and oral mucosa moist and clear  Pulm: Diminished in lower lobes bilaterally, normal work of breathing on room air  CV: S1, S2 w/o murmurs or gallops; 2+ edema in BLE; (-) JVD  GI: Soft with normal bowel sounds in all quadrants, no masses on palpation; truncal obesity   MSK: Full ROM of all extremities  Derm: Poor foot hygiene  Neuro: AAOx4; motor/sensory function intact  Psych: Cooperative; appropriate mood and affect    Laboratory:  Labs on admission reviewed    Imagin2023 - CXR revealed cardiomegaly with decreased pulmonary vascular markings compared to previous CXR    ASSESSMENT & PLAN:     A-fib RVR  Hypokalemia  -EKG on admission revealed A-fib with similar findings compared to previous readings  -Received diltiazem IV in the ED to achieve rate controlled  -Will replete with KCL 40 mEq PO x2  -Continue home diltiazem, metoprolol and replete electrolytes as needed    HTN urgency  -/133 in ED  -Will resume patient's home meds first and give lasix 40mg IV x1; aim to reduce MAP by 25% in first hour and keep BP <160/110 mmHg in the next 6 hours; " will slowly reduce BP to normal limit over 48 hours    HFpEF (EF 55-60% 8/15/2023)  -, , trop negative; CXR on admission revealed cardiomegaly with decreased pulmonary vascular markings compared to previous CXR  -Weight: 106.6 kg (8/16/2023), 110.7 kg this admission  -Will give Lasix 40mg IV once and another dose next AM; initiated strict I/O and daily weights  -Continue home metoprolol, spironolactone    Coronary artery disease  -Continue home ASA    HLD  -Lipid panel 8/14/2023: cholesterol 127, triglyceride 103, LDL 75  -Continue home statin      Lactic acidosis  -bolused 500 cc with improvement  -avoiding more volume resuscitation due to CHF  -will trend and monitor    DVT PPx: Xarelto  GI PPx: None  Diet: Cardiac + low sodium  ABX: None  Fluids: None  O2: Room air  PCP: Magdi Rivera DO    Disposition (08/26/2023): Admitted to inpatient service for ongoing monitoring and medical therapy. Patient can be discharged home when medically stable.     Kenny Richter DO   [facility], PGY-II

## 2023-08-27 NOTE — ED PROVIDER NOTES
Encounter Date: 8/26/2023       History     Chief Complaint   Patient presents with    Leg Swelling     Pt presents to ed with reports of bilateral lower leg edema x1 weak. States pressure pain,. Pt denies CP, SOB, n/v/d at present.      Patient presents today endorsing generalized weakness, palpitations.  He reports he was recently admitted and discharged for rapid atrial fibrillation.  He reports the lower extremities are chronically edematous (symmetric bilaterally).  He does say that the lower extremities are less edematous today than they were at the previous admission.  He is not able to describe which medications he is taking at home, suggestive of imperfect compliance with prescribed medications.  He is denying chest pain, dyspnea, nausea, diaphoresis, fever, chills, myalgia.        Review of patient's allergies indicates:  No Known Allergies  Past Medical History:   Diagnosis Date    Arthritis     Atrial fibrillation     BPH (benign prostatic hyperplasia)     Cardiac arrest     Coronary artery disease     Cyst, kidney, acquired     Diverticulosis     Hyperlipidemia     Hypertension     MI (myocardial infarction)     Obesity     Steatosis of liver      Past Surgical History:   Procedure Laterality Date    A-V CARDIAC PACEMAKER INSERTION Right     CARDIAC CATHETERIZATION      COLONOSCOPY W/ BIOPSIES      excision of colon       Family History   Problem Relation Age of Onset    Hypertension Mother     Hypertension Father     Hypertension Sister      Social History     Tobacco Use    Smoking status: Former    Smokeless tobacco: Never   Substance Use Topics    Alcohol use: Not Currently    Drug use: Not Currently     Review of Systems    Physical Exam     Initial Vitals [08/26/23 1937]   BP Pulse Resp Temp SpO2   (!) 190/133 108 18 97.3 °F (36.3 °C) 99 %      MAP       --         Physical Exam    Nursing note and vitals reviewed.  Constitutional: He appears well-developed and well-nourished. He is not  diaphoretic. No distress.   HENT:   Head: Normocephalic and atraumatic.   Eyes: EOM are normal. Pupils are equal, round, and reactive to light. Right eye exhibits no discharge. Left eye exhibits no discharge.   Neck: Neck supple. No thyromegaly present. No tracheal deviation present. No JVD present.   Normal range of motion.  Cardiovascular:  Normal rate, regular rhythm, normal heart sounds and intact distal pulses.           No murmur heard.  Pulmonary/Chest: Breath sounds normal. No stridor. No respiratory distress. He has no wheezes. He has no rhonchi. He has no rales.   Abdominal: Abdomen is soft. He exhibits no distension. There is no abdominal tenderness. There is no rebound and no guarding.   Musculoskeletal:         General: No tenderness or edema. Normal range of motion.      Cervical back: Normal range of motion and neck supple.     Neurological: He is alert and oriented to person, place, and time. He has normal strength. No cranial nerve deficit. GCS score is 15. GCS eye subscore is 4. GCS verbal subscore is 5. GCS motor subscore is 6.   Skin: Skin is warm and dry. Capillary refill takes less than 2 seconds. No rash and no abscess noted. No erythema. No pallor.   Psychiatric: He has a normal mood and affect. His behavior is normal. Judgment and thought content normal.         ED Course   Procedures  Labs Reviewed   COMPREHENSIVE METABOLIC PANEL - Abnormal; Notable for the following components:       Result Value    Potassium Level 3.4 (*)     Chloride 108 (*)     Globulin 3.6 (*)     Albumin/Globulin Ratio 1.0 (*)     All other components within normal limits   CK - Abnormal; Notable for the following components:    Creatine Kinase 246 (*)     All other components within normal limits   B-TYPE NATRIURETIC PEPTIDE - Abnormal; Notable for the following components:    Natriuretic Peptide 423.1 (*)     All other components within normal limits   URINALYSIS, REFLEX TO URINE CULTURE - Abnormal; Notable for the  following components:    Color, UA Colorless (*)     Protein, UA 1+ (*)     Blood, UA Trace (*)     Leukocyte Esterase, UA 75 (*)     All other components within normal limits   CBC WITH DIFFERENTIAL - Abnormal; Notable for the following components:    RBC 4.46 (*)     Hgb 13.6 (*)     Hct 40.1 (*)     IG# 0.05 (*)     All other components within normal limits   LIPASE - Normal   MAGNESIUM - Normal   CK-MB - Normal   TROPONIN I - Normal   COVID/FLU A&B PCR - Normal    Narrative:     The Xpert Xpress SARS-CoV-2/FLU/RSV plus is a rapid, multiplexed real-time PCR test intended for the simultaneous qualitative detection and differentiation of SARS-CoV-2, Influenza A, Influenza B, and respiratory syncytial virus (RSV) viral RNA in either nasopharyngeal swab or nasal swab specimens.         DRUG SCREEN, URINE (BEAKER) - Normal    Narrative:     Cut off concentrations:    Amphetamines - 1000 ng/ml  Barbiturates - 200 ng/ml  Benzodiazepine - 200 ng/ml  Cannabinoids (THC) - 50 ng/ml  Cocaine - 300 ng/ml  Fentanyl - 1.0 ng/ml  MDMA - 500 ng/ml  Opiates - 300 ng/ml   Phencyclidine (PCP) - 25 ng/ml    Specimen submitted for drug analysis and tested for pH and specific gravity in order to evaluate sample integrity. Suspect tampering if specific gravity is <1.003 and/or pH is not within the range of 4.5 - 8.0  False negatives may result form substances such as bleach added to urine.  False positives may result for the presence of a substance with similar chemical structure to the drug or its metabolite.    This test provides only a PRELIMINARY analytical test result. A more specific alternate chemical method must be used in order to obtain a confirmed analytical result. Gas chromatography/mass spectrometry (GC/MS) is the preferred confirmatory method. Other chemical confirmation methods are available. Clinical consideration and professional judgement should be applied to any drug of abuse test result, particularly when  preliminary positive results are used.    Positive results will be confirmed only at the physicians request. Unconfirmed screening results are to be used only for medical purposes (treatment).        LACTIC ACID, PLASMA - Normal   CBC W/ AUTO DIFFERENTIAL    Narrative:     The following orders were created for panel order CBC auto differential.  Procedure                               Abnormality         Status                     ---------                               -----------         ------                     CBC with Differential[[id number]]        Abnormal            Final result                 Please view results for these tests on the individual orders.   EXTRA TUBES    Narrative:     The following orders were created for panel order EXTRA TUBES.  Procedure                               Abnormality         Status                     ---------                               -----------         ------                     Light Blue Top Hold[[id number]]                              In process                 Red Top Hold[[id number]]                                     In process                 Gold Top Hold[[id number]]                                    In process                   Please view results for these tests on the individual orders.   LIGHT BLUE TOP HOLD   RED TOP HOLD   GOLD TOP HOLD     EKG Readings: (Independently Interpreted)   While patient arrives tachycardic at 1:40 a.m., EKG timed at 7:34 p.m. demonstrates atrial fibrillation at rate 113, there are nonspecific T-wave changes, appearing unchanged in comparison to prior study;     ECG Results              EKG 12-lead (Final result)  Result time 08/27/23 07:51:07      Final result by Interface, Lab In [facility] (08/27/23 07:51:07)                   Narrative:    Test Reason : I48.91,    Vent. Rate : 113 BPM     Atrial Rate : 370 BPM     P-R Int : 000 ms          QRS Dur : 094 ms      QT Int : 430 ms       P-R-T Axes : 000 003 -63 degrees     QTc  Int : 589 ms    Atrial flutter with variable A-V block  Nonspecific ST and T wave abnormality  Abnormal ECG  When compared with ECG of 26-AUG-2023 19:34,  Atrial flutter has replaced Atrial fibrillation  Nonspecific T wave abnormality, worse in Anterior leads  Confirmed by Jg Allen MD ([id number]) on 8/27/2023 7:50:55 AM    Referred By: [email or web address]   SELF           Confirmed By:Jg Allen MD                                     EKG 12-LEAD (Final result)  Result time 09/05/23 13:01:37      Final result by Unknown User (09/05/23 13:01:37)                                      Imaging Results              X-Ray Chest AP Portable (Final result)  Result time 08/26/23 20:09:48      Final result by Rico Escoto MD (08/26/23 20:09:48)                   Impression:      Cardiomegaly without acute cardiopulmonary abnormality.      Electronically signed by: Rico Escoto MD  Date:    08/26/2023  Time:    20:09               Narrative:    EXAMINATION:  Single view chest radiograph.    CLINICAL HISTORY:  Weakness    TECHNIQUE:  Single view of the chest.    COMPARISON:  CT of the chest abdomen and pelvis 08/23/2023.    FINDINGS:  The lungs are clear without consolidation or effusion.  There is no pneumothorax.  The cardiac silhouette is enlarged.  The right upper chest AICD is unchanged.  There is no acute osseous abnormality.                                    X-Rays:   Independently Interpreted Readings:   Chest X-Ray: Chest x-ray demonstrates considerable cardiomegaly, unchanged in comparison to previous studies; no findings suggestive of overt pulmonary vascular congestion;     Medications   diltiaZEM injection 10 mg (10 mg Intravenous Given 8/26/23 1958)   aspirin tablet 325 mg (325 mg Oral Given 8/26/23 2050)   diltiaZEM injection 20 mg (20 mg Intravenous Given 8/26/23 2049)   potassium chloride SA CR tablet 40 mEq (40 mEq Oral Given 8/27/23 0134)   furosemide injection 40 mg (40 mg Intravenous Given 8/26/23 2154)    furosemide injection 40 mg (40 mg Intravenous Given 8/27/23 0835)     Medical Decision Making  68-year-old male presents today with general weakness, lower extremity edema.  He is endorsing the edema appears improved him in comparison to the time of previous discharged from the ED. patient is without a known ischemic myopathy, with documentation including an echo with retained ejection fraction at the recent admission.  He nevertheless presents today with noted lower extremity edema, weakness.  Differential diagnosis includes atrial fibrillation with rapid ventricular response, potentially compatible with medication noncompliance.  Differential diagnosis includes ACS/NSTEMI, volume overload, rapid atrial fibrillation, others ....    Amount and/or Complexity of Data Reviewed  External Data Reviewed: notes.     Details: Review of records confirms relatively frequent visits for worsening congestive heart failure/AFib.  The notes appear indicative of concern for imperfect compliance with outpatient medications.  Patient's medications are filled at the pharmacy here at [facility].  Review of those records suggestive that patient does in fact fill his rate control agents, also his diuretics.  Of course the fact of having filling the medications does not necessarily confirm use of the medications as prescribed.  This being said both rate control and diuretic meds have a greater than 98% fill rate since May of 2023 as of the current date;  Labs: ordered.  Radiology: ordered and independent interpretation performed. Decision-making details documented in ED Course.     Details: Chest x-ray demonstrates considerable cardiomegaly, unchanged in comparison to previous studies; no findings suggestive of overt pulmonary vascular congestion;  ECG/medicine tests: ordered and independent interpretation performed. Decision-making details documented in ED Course.     Details: EKG timed at 7:34 p.m. demonstrates atrial fibrillation at rate  113, there are nonspecific T-wave changes, appearing unchanged in comparison to prior study;      Risk  OTC drugs.  Prescription drug management.  Decision regarding hospitalization.               ED Course as of 09/15/23 0744   Sat Aug 26, 2023   2012 Reassuring hemogram; [CT]   2012 Chest x-ray demonstrates considerable cardiomegaly, unchanged in comparison to previous studies; no findings suggestive of overt pulmonary vascular congestion; [CT]   2013 Heart rate presently 97, having received 10 mg of IV Dilt; patient remains moderately hypertensive; [CT]   2021 Reassuring urinalysis; [CT]   2021 Negative lactate; [CT]   2029 Bnp ; [CT]   2029 Negative troponin ; [CT]   2029 Cpk 246 ; [CT]   2030 Reassuring chemistries ; [CT]   2036 Mildly elevated total ck; negative mb fraction ; [CT]      ED Course User Index  [CT] Luis Hoyos MD                      Clinical Impression:   Final diagnoses:  [I48.91] A-fib  [R53.1] Asthenia  [I48.91] Atrial fibrillation with RVR        ED Disposition Condition    Observation                 Luis Hoyos MD  09/15/23 0744

## 2023-08-27 NOTE — PROGRESS NOTES
[facility] Hospital Medicine  Progress Note      Patient Name: Delio Daniel Jr.  : 1955  MRN: [id number]  Patient Class: OP- Observation   Admission Date: 2023   Length of Stay: 0  Admitting Service: Hospital Medicine  Attending Physician: Love Fonseca MD  PCP: Magdi Rivera DO    CHIEF COMPLAINT   Hospital follow up    HOSPITAL COURSE     HPI: Delio Daniel Jr. is a 68 y.o. male with PMH of A-fib (on xarelto), HTN, HLD, history of cardiac arrest and MI, HFpEF (EF 55-60% 8/15/2023), presented to the ED on 2023 with a CC of swelling in lower extremities that started approximately 2 weeks ago and worsened this morning; he states that nothing makes the swelling better or worse and he also notices that his shoes and clothes are getting tighter. Other associating symptom includes increased dyspnea on exertion and reduced endurance. He was last hospitalized on 2023 for CHF exacerbation needing diuresis. He states that his diet is poor in general and includes high sodium intake and fried foods on a daily basis. In the ED: /133; H/H stable, K+ 3.4, , , trop negative. Per ED provider, HR range was in the 120-130's, achieved rate control after 1 dose of diltiazem IV. Hospital medicine was consulted for A-fib RVR and HTN urgency.      ROS:   (+) Lower extremity edema, increased dyspnea on exertion, reduced endurance  (-) Chest pain, palpitations, fever, night sweat, chills, diarrhea, constipation     PMH: As stated above  Family HX: Mother (CHF, stroke); father (stroke)  Allergy: NKDA  Social HX: 1 PPD x10+ years, quit in ; denies alcohol and illicit drug use  Previous hosp: 2023 for lower extremity edema    Interval History: NAEON, AFVSS, lactic acidosis wnl since admission      OBJECTIVE/PHYSICAL EXAM     VITAL SIGNS (MOST RECENT):  Temp: 97.8 °F (36.6 °C) (23 0306)  Pulse: 82 (23 0306)  Resp: 18 (23 2235)  BP: (!) 158/110  "(08/27/23 0306)  SpO2: 100 % (08/27/23 0306) VITAL SIGNS (24 HOUR RANGE):  Temp:  [97.3 °F (36.3 °C)-97.8 °F (36.6 °C)]   Pulse:  []   Resp:  [0-22]   BP: (124-197)/()   SpO2:  [98 %-100 %]      Physical Examination:  General: Obese w/o distress  HEENT: NC/AT; PERRL; nasal and oral mucosa moist and clear  Pulm: Diminished in lower lobes bilaterally, normal work of breathing on room air  CV: S1, S2 w/o murmurs or gallops; 2+ edema in BLE; (-) JVD  GI: Soft with normal bowel sounds in all quadrants, no masses on palpation; truncal obesity   MSK: Full ROM of all extremities  Derm: Poor foot hygiene  Neuro: AAOx4; motor/sensory function intact  Psych: Cooperative; appropriate mood and affect    LABS/MICRO/MEDS/DIAGNOSTICS     LABS  CBC  Recent Labs     08/26/23 1958 08/27/23 0324   WBC 6.35 6.28   RBC 4.46* 4.80   HGB 13.6* 13.9*   HCT 40.1* 42.9   MCV 89.9 89.4   MCH 30.5 29.0   MCHC 33.9 32.4*   RDW 14.2 14.0    237     Anemia  No results for input(s): "HAPTOGLOBIN", "FERRITIN", "IRON", "TIBC", "[id number]", "FOLATE" in the last 72 hours.  Coags  No results for input(s): "INR", "APTT", "D-DIMER" in the last 72 hours.  Cardiac  Recent Labs     08/26/23 1958   .1*   *   TROPONINI 0.015     ABG/Lactate  Recent Labs     08/26/23 1958   LACTIC 1.5       BMP  Recent Labs     08/26/23 1958 08/27/23 0324    141   K 3.4* 3.8   CHLORIDE 108* 107   CO2 25 25   BUN 10.8 9.9   CREATININE 1.14 0.99   GLUCOSE 95 92   CALCIUM 9.2 9.4   MG 1.90 2.00     LFTs  Recent Labs     08/26/23 1958 08/27/23  0324   ALBUMIN 3.6 3.5   GLOBULIN 3.6* 3.6*   ALKPHOS 57 58   ALT 26 24   AST 21 18   BILITOT 0.9 0.8   LIPASE 28  --      Inflammatory Markers  No results for input(s): "CRP", "LDH", "ESR" in the last 72 hours.  Lipid  No results for input(s): "CHOL", "TRIG", "LDL", "VLDL", "HDL" in the last 72 hours.  Diabetes  Recent Labs     08/26/23 1958 08/27/23  0324   GLUCOSE 95 92     Thyroid  No " "results for input(s): "TSH", "FREET4" in the last 72 hours.     INTAKE/OUTPUT    Intake/Output Summary (Last 24 hours) at 8/27/2023 0616  Last data filed at 8/27/2023 0126  Gross per 24 hour   Intake --   Output 1500 ml   Net -1500 ml        MICROBIOLOGY  Microbiology Results (last 7 days)       ** No results found for the last 168 hours. **             MEDICATIONS   aspirin  81 mg Oral Daily    atorvastatin  40 mg Oral Daily    diltiaZEM  360 mg Oral Daily    furosemide (LASIX) injection  40 mg Intravenous Once    losartan  50 mg Oral Daily    metoprolol succinate  200 mg Oral BID    rivaroxaban  20 mg Oral with dinner    spironolactone  50 mg Oral Daily         INFUSIONS       DIAGNOSTIC TESTS  X-Ray Chest AP Portable   Final Result      Cardiomegaly without acute cardiopulmonary abnormality.         Electronically signed by: Rico Escoto MD   Date:    08/26/2023   Time:    20:09           EF   Date Value Ref Range Status   03/10/2023 50 % Final          ASSESSMENT/PLAN     A-fib RVR (120s)  Hypokalemia  -EKG on admission revealed A-fib/A-flutter with similar findings compared to previous readings  -Received diltiazem IV in the ED to achieve rate controlled  -s/p KCL 40 mEq PO x2  -Continue home diltiazem, metoprolol and replete electrolytes as needed     HTN urgency  -/133 in ED  -Resume patient's home meds losartan, metoprolol, spironolactone   -s/p lasix 40mg IV x1 w/ aim to reduce MAP by 25% in first hour and keep BP <160/110 mmHg in the next 6 hours after admission  -Will slowly reduce BP to normal limit over 48 hours, PRN Hydralazine ordered     HFpEF (EF 55-60% 8/15/2023)  -, , trop negative; CXR on admission revealed cardiomegaly with decreased pulmonary vascular markings compared to previous CXR  -Weight: 106.6 kg (8/16/2023), 110.7 kg this admission, today 89.8 kg  -s/p Lasix 40mg IV in ED will receive another dose in morning; initiated strict I/O and daily weights  -UOP 2L since " admission  -Continue home metoprolol, spironolactone     Coronary artery disease  -Continue home ASA     HLD  -Lipid panel 8/14/2023: cholesterol 127, triglyceride 103, LDL 75  -Continue home statin     DVT PPx: Xarelto  GI PPx: None  Diet: Cardiac + low sodium  ABX: None  Fluids: None  O2: Room air  PCP: Magdi Rivera DO     Disposition (08/26/2023): Admitted to inpatient service for ongoing monitoring and medical therapy. Patient can be discharged home when medically stable.   ________________________________________________________________      Case related differential diagnoses have been reviewed; assessment and plan has been documented. I have personally reviewed the labs and test results that are currently available; I have reviewed the patients medication list. I have reviewed the consulting providers recommendations. I have reviewed or attempted to review medical records based upon their availability.  All of the patient's and/or family's questions have been addressed and answered to the best of my ability.  I will continue to monitor closely and make adjustments to medical management as needed.  Please contact me if any questions may rise regarding documentation to clarify transcription.      Issa Crowder MD  U , [facility]

## 2023-08-28 VITALS
WEIGHT: 198.69 LBS | TEMPERATURE: 98 F | HEIGHT: 69 IN | HEART RATE: 68 BPM | DIASTOLIC BLOOD PRESSURE: 70 MMHG | RESPIRATION RATE: 20 BRPM | SYSTOLIC BLOOD PRESSURE: 107 MMHG | BODY MASS INDEX: 29.43 KG/M2 | OXYGEN SATURATION: 100 %

## 2023-08-28 LAB
ALBUMIN SERPL-MCNC: 3.3 G/DL (ref 3.4–4.8)
ALBUMIN/GLOB SERPL: 1 RATIO (ref 1.1–2)
ALP SERPL-CCNC: 58 UNIT/L (ref 40–150)
ALT SERPL-CCNC: 22 UNIT/L (ref 0–55)
AST SERPL-CCNC: 18 UNIT/L (ref 5–34)
BASOPHILS # BLD AUTO: 0.08 X10(3)/MCL
BASOPHILS NFR BLD AUTO: 1 %
BILIRUB SERPL-MCNC: 0.8 MG/DL
BUN SERPL-MCNC: 14.5 MG/DL (ref 8.4–25.7)
CALCIUM SERPL-MCNC: 9.3 MG/DL (ref 8.8–10)
CHLORIDE SERPL-SCNC: 106 MMOL/L (ref 98–107)
CO2 SERPL-SCNC: 26 MMOL/L (ref 23–31)
CREAT SERPL-MCNC: 1.1 MG/DL (ref 0.73–1.18)
EOSINOPHIL # BLD AUTO: 0.19 X10(3)/MCL (ref 0–0.9)
EOSINOPHIL NFR BLD AUTO: 2.4 %
ERYTHROCYTE [DISTWIDTH] IN BLOOD BY AUTOMATED COUNT: 14.3 % (ref 11.5–17)
GFR SERPLBLD CREATININE-BSD FMLA CKD-EPI: >60 MLS/MIN/1.73/M2
GLOBULIN SER-MCNC: 3.4 GM/DL (ref 2.4–3.5)
GLUCOSE SERPL-MCNC: 85 MG/DL (ref 82–115)
HCT VFR BLD AUTO: 41 % (ref 42–52)
HGB BLD-MCNC: 13.8 G/DL (ref 14–18)
IMM GRANULOCYTES # BLD AUTO: 0.05 X10(3)/MCL (ref 0–0.04)
IMM GRANULOCYTES NFR BLD AUTO: 0.6 %
LYMPHOCYTES # BLD AUTO: 2.33 X10(3)/MCL (ref 0.6–4.6)
LYMPHOCYTES NFR BLD AUTO: 29.8 %
MCH RBC QN AUTO: 30.3 PG (ref 27–31)
MCHC RBC AUTO-ENTMCNC: 33.7 G/DL (ref 33–36)
MCV RBC AUTO: 89.9 FL (ref 80–94)
MONOCYTES # BLD AUTO: 0.87 X10(3)/MCL (ref 0.1–1.3)
MONOCYTES NFR BLD AUTO: 11.1 %
NEUTROPHILS # BLD AUTO: 4.31 X10(3)/MCL (ref 2.1–9.2)
NEUTROPHILS NFR BLD AUTO: 55.1 %
NRBC BLD AUTO-RTO: 0 %
PLATELET # BLD AUTO: 272 X10(3)/MCL (ref 130–400)
PMV BLD AUTO: 10.3 FL (ref 7.4–10.4)
POTASSIUM SERPL-SCNC: 3.8 MMOL/L (ref 3.5–5.1)
PROT SERPL-MCNC: 6.7 GM/DL (ref 5.8–7.6)
RBC # BLD AUTO: 4.56 X10(6)/MCL (ref 4.7–6.1)
SODIUM SERPL-SCNC: 142 MMOL/L (ref 136–145)
WBC # SPEC AUTO: 7.83 X10(3)/MCL (ref 4.5–11.5)

## 2023-08-28 PROCEDURE — 25000003 PHARM REV CODE 250: Performed by: STUDENT IN AN ORGANIZED HEALTH CARE EDUCATION/TRAINING PROGRAM

## 2023-08-28 PROCEDURE — 94761 N-INVAS EAR/PLS OXIMETRY MLT: CPT

## 2023-08-28 PROCEDURE — 96376 TX/PRO/DX INJ SAME DRUG ADON: CPT

## 2023-08-28 PROCEDURE — 94760 N-INVAS EAR/PLS OXIMETRY 1: CPT

## 2023-08-28 PROCEDURE — G0378 HOSPITAL OBSERVATION PER HR: HCPCS

## 2023-08-28 PROCEDURE — 63600175 PHARM REV CODE 636 W HCPCS: Performed by: STUDENT IN AN ORGANIZED HEALTH CARE EDUCATION/TRAINING PROGRAM

## 2023-08-28 PROCEDURE — 80053 COMPREHEN METABOLIC PANEL: CPT | Performed by: STUDENT IN AN ORGANIZED HEALTH CARE EDUCATION/TRAINING PROGRAM

## 2023-08-28 PROCEDURE — 85025 COMPLETE CBC W/AUTO DIFF WBC: CPT | Performed by: STUDENT IN AN ORGANIZED HEALTH CARE EDUCATION/TRAINING PROGRAM

## 2023-08-28 RX ADMIN — DILTIAZEM HYDROCHLORIDE 360 MG: 180 CAPSULE, COATED, EXTENDED RELEASE ORAL at 08:08

## 2023-08-28 RX ADMIN — LOSARTAN POTASSIUM 50 MG: 25 TABLET, FILM COATED ORAL at 08:08

## 2023-08-28 RX ADMIN — RIVAROXABAN 20 MG: 10 TABLET, FILM COATED ORAL at 04:08

## 2023-08-28 RX ADMIN — HYDRALAZINE HYDROCHLORIDE 10 MG: 20 INJECTION INTRAMUSCULAR; INTRAVENOUS at 12:08

## 2023-08-28 RX ADMIN — ATORVASTATIN CALCIUM 40 MG: 40 TABLET, FILM COATED ORAL at 08:08

## 2023-08-28 RX ADMIN — METOPROLOL SUCCINATE 200 MG: 50 TABLET, FILM COATED, EXTENDED RELEASE ORAL at 08:08

## 2023-08-28 RX ADMIN — SPIRONOLACTONE 50 MG: 25 TABLET ORAL at 08:08

## 2023-08-28 RX ADMIN — ASPIRIN 81 MG CHEWABLE TABLET 81 MG: 81 TABLET CHEWABLE at 08:08

## 2023-08-28 NOTE — DISCHARGE SUMMARY
[facility] Discharge Summary    Admitting Physician: Love Fonseca MD  Attending Physician: Jesus Daniel DO  Date of Admit: 8/26/2023  Date of Discharge: 8/28/2023    Condition: Stable  Outcome: Condition has improved and patient is now ready for discharge.  DISPOSITION: Home or Self Care    Discharge Diagnoses     Patient Active Problem List   Diagnosis    Neuroendocrine carcinoma of small bowel    Nodule of left lung    Longstanding persistent atrial fibrillation    Hypertension    Hyperlipidemia LDL goal <70    Hypokalemia    Coronary artery disease involving native heart without angina pectoris    Second degree AV block    Cardiac arrest with ventricular fibrillation    Cardiac pacemaker in situ    History of deep vein thrombosis (DVT) of lower extremity    Current use of long term anticoagulation    Hypertensive urgency    Bilateral lower extremity edema    Atrial fibrillation with rapid ventricular response     Principal Problem:  Atrial fibrillation with rapid ventricular response    Consultants and Procedures     Consultants:  IP CONSULT TO HOSPITAL MEDICINE    Procedures:   * No surgery found *     Brief Admission History      Delio Daniel Jr. is a 68 y.o. male with PMH of A-fib (on xarelto), HTN, HLD, history of cardiac arrest and MI, HFpEF (EF 55-60% 8/15/2023), presented to the ED on 8/26/2023 with a CC of swelling in lower extremities that started approximately 2 weeks ago and worsened this morning; he states that nothing makes the swelling better or worse and he also notices that his shoes and clothes are getting tighter. Other associating symptom includes increased dyspnea on exertion and reduced endurance. He was last hospitalized on 8/14/2023 for CHF exacerbation needing diuresis. He states that his diet is poor in general and includes high sodium intake and fried foods on a daily basis. In the ED: /133; H/H stable, K+ 3.4, , , trop negative. Per ED provider, HR  "range was in the 120-130's, achieved rate control after 1 dose of diltiazem IV. Hospital medicine was consulted for A-fib RVR and HTN urgency.     Hospital Course with Pertinent Findings     Patient admitted for AFib RVR and uncontrolled hypertension due to noncompliance with home medication regimen.  He has had multiple ED for the same issue and is unsure which medications he is taking.  He received IV diltiazem in the ED which achieved rate control.  His home medications were restarted and he was just discharged home in stable condition.  Home medication regimen will be sorted out on outpatient basis with PCP.  Return ED precautions were discussed at bedside.    Discharge physical exam:  Vitals  BP: (!) 149/103  Temp: 98.2 °F (36.8 °C)  Temp Source: Oral  Pulse: 84  Resp: 20  SpO2: 100 %  Height: 5' 9" (175.3 cm)  Weight: 90.1 kg (198 lb 11.2 oz)    General: Obese w/o distress  HEENT: NC/AT; PERRL; nasal and oral mucosa moist and clear  Pulm: Diminished in lower lobes bilaterally, normal work of breathing on room air  CV: S1, S2 w/o murmurs or gallops; no LE edema; (-) JVD  GI: Soft with normal bowel sounds in all quadrants, no masses on palpation; truncal obesity   MSK: Full ROM of all extremities  Derm: Poor foot hygiene  Neuro: AAOx4; motor/sensory function intact  Psych: Cooperative; appropriate mood and affect    TIME SPENT ON DISCHARGE: 60 minutes    Discharge Medications        Medication List        CONTINUE taking these medications      albuterol 90 mcg/actuation inhaler  Commonly known as: PROVENTIL/VENTOLIN HFA  Inhale 2 puffs into the lungs every 6 (six) hours as needed for Wheezing. Rescue     aspirin 81 MG Chew  chew and swallow 1 tablet by mouth daily     atorvastatin 40 MG tablet  Commonly known as: LIPITOR  Take 1 tablet (40 mg total) by mouth once daily.     cetirizine 10 MG tablet  Commonly known as: ZYRTEC  Take 1 tablet (10 mg total) by mouth once daily. for 14 days     diltiaZEM 360 MG 24 hr " capsule  Commonly known as: CARDIZEM CD  Take 1 capsule (360 mg total) by mouth once daily.     famotidine 20 MG tablet  Commonly known as: PEPCID  Take 1 tablet (20 mg total) by mouth 2 (two) times daily. for 10 days     hyoscyamine 0.125 mg Tab  Commonly known as: ANASPAZ,LEVSIN     losartan 50 MG tablet  Commonly known as: COZAAR  Take 1 tablet (50 mg total) by mouth once daily.     methocarbamoL 750 MG Tab  Commonly known as: ROBAXIN     metoprolol succinate 200 MG 24 hr tablet  Commonly known as: TOPROL-XL  Take 1 tablet (200 mg total) by mouth 2 (two) times daily.     ondansetron 4 MG Tbdl  Commonly known as: ZOFRAN-ODT     spironolactone 50 MG tablet  Commonly known as: ALDACTONE  Take 1 tablet (50 mg total) by mouth once daily.     traMADoL 50 mg tablet  Commonly known as: ULTRAM  Take 1 tablet (50 mg total) by mouth every 6 (six) hours as needed for Pain.     vitamin D 1000 units Tab  Commonly known as: VITAMIN D3  Take 1 tablet (1,000 Units total) by mouth once daily.     XARELTO 20 mg Tab  Generic drug: rivaroxaban  TAKE 1 TABLET BY MOUTH DAILY with SUPPER            STOP taking these medications      LIDOcaine 5 %  Commonly known as: LIDODERM            Discharge Information:      Follow-up Information       Magdi Rivera, DO Follow up.    Specialty: Internal Medicine  Why: Keep scheduled appointment on 9/18/2023.  Contact information:  [address] [facility] [address]  [phone]                         Siddharth Dumont MD  [facility] PGY-II

## 2023-08-28 NOTE — PROGRESS NOTES
Faculty addendum:     I have reviewed the patients history, residents  findings on physical examination, diagnosis and treatment plan. Care provided was reasonable and necessary.     Patient was seen and examined this morning.    Never ending valadez, multiple admissions ER visits for the same, recently had a nurse visit where there was a large discrepancy and his current prescriptions and past prescriptions, with apparently full bottles of prescription medications including his antihypertensives, all of which were still full and there were multiple duplicates  This needs to be sorted out on an outpatient basis, currently blood pressure controlled, rate controlled, no lower extremity edema.    Also, he continues to endorse a high sodium diet as well.    Unstable living situation as well it seems with brother he stays with who apparently is an alcoholic which is why patient visits our hospital frequently as well it seems, gets his meals here.    If this continues to be an ongoing issue, we may need to discuss long-term placement for this patient.    Fully dictated progress note/dc summary from resident is forthcoming.

## 2023-08-28 NOTE — PROGRESS NOTES
"Inpatient Nutrition Evaluation    Admit Date: 2023   Total duration of encounter: 2 days    Nutrition Recommendation/Prescription     Continue heart healthy diet  Pt education on diet/complete  MVI/fe  Biweekly wt  Will monitor nutrition status     Nutrition Assessment     Chart Review    Reason Seen: continuous nutrition monitoring    Malnutrition Screening Tool Results   Have you recently lost weight without trying?: No  Have you been eating poorly because of a decreased appetite?: No   MST Score: 0     Diagnosis:  Afib/RVR, hypokalemia, HTN, CHF, HLD     Relevant Medical History: afib, HTN, HLD, cardiac arrest/MI, Chf     Nutrition-Related Medications: aspirin, atorvastatin, losartan, spironolactone     Nutrition-Related Labs:  () H/H 13.8/41.0 (L) Gluc 85 Bun 14.5 cr 1.1 K 3.8 Alb 3.3(L)     Diet Order: Diet heart healthy Low Sodium; Fluid - 1500mL  Oral Supplement Order: none  Appetite/Oral Intake: good/% of meals  Factors Affecting Nutritional Intake: none identified  Food/Presybeterian/Cultural Preferences: none reported  Food Allergies: none reported    Skin Integrity: intact  Wound(s):   none    Comments  () Pt familiar from prior admits; pt reported good appetite; no N/V; + LE edema/swelling; on diuretic; wt fluctuates with fluid; pt not strict with diet/ eat higher sodium foods; reinforced cardiac diet principles.     Anthropometrics    Height: 5' 9" (175.3 cm) Height Method: Stated  Last Weight: 90.1 kg (198 lb 11.2 oz) (23 0343) Weight Method: Standard Scale  BMI (Calculated): 29.3  BMI Classification: overweight (BMI 25-29.9)        Ideal Body Weight (IBW), Male: 160 lb     % Ideal Body Weight, Male (lb): 152.5 %                 Usual Body Weight (UBW), k.1 kg (pt stated wt fluctuates with fluid/edema)  % Usual Body Weight: 100.24     Usual Weight Provided By: patient and EMR weight history    Wt Readings from Last 5 Encounters:   23 90.1 kg (198 lb 11.2 oz)   23 " 104.3 kg (229 lb 15 oz)   08/17/23 104.3 kg (230 lb)   08/16/23 106.6 kg (235 lb)   08/11/23 107.5 kg (237 lb)     Weight Change(s) Since Admission:  Admit Weight: 110.7 kg (244 lb) (08/26/23 1937)  Pt reproted wt fluctuates /fluid     Patient Education    Education Provided: heart healthy diet  Teaching Method: explanation and printed materials  Comprehension: verbalizes understanding  Barriers to Learning: desire/motivation  Expected Compliance: fair  Comments: All questions were answered and dietitian's contact information was provided.     Monitoring & Evaluation     Dietitian will monitor food and beverage intake, weight, and food/nutrition knowledge skill.  Nutrition Risk/Follow-Up: low (follow-up in 5-7 days)  Patients assigned 'low nutrition risk' status do not qualify for a full nutritional assessment but will be monitored and re-evaluated in a 5-7 day time period. Please consult if re-evaluation needed sooner.

## 2023-08-28 NOTE — PLAN OF CARE
08/28/23 0955   Discharge Assessment   Assessment Type Discharge Planning Assessment   Confirmed/corrected address, phone number and insurance Yes   Confirmed Demographics Correct on Facesheet   Source of Information patient   When was your last doctors appointment?   (Magdi Rivera)   Reason For Admission A-fib  R53.1 Asthenia  I48.91 Atrial fibrillation with rapid ventricular response  I48.91 Atrial fibrillation with RVR   People in Home sibling(s);other relative(s)   Facility Arrived From: Home   Do you expect to return to your current living situation? Yes   Do you have help at home or someone to help you manage your care at home? Yes   Who are your caregiver(s) and their phone number(s)? Analisa Daniel (Daughter)   [phone]; Beth Wilson ([phone])   Prior to hospitilization cognitive status: Alert/Oriented   Walking or Climbing Stairs ambulation difficulty, requires equipment   Mobility Management Quad cane   Equipment Currently Used at Home cane, quad   Readmission within 30 days? Yes   Patient currently being followed by outpatient case management? No   Do you currently have service(s) that help you manage your care at home? No   Do you take prescription medications? Yes  ([facility])   Do you have prescription coverage? Yes   Coverage [employer]   Do you have any problems affording any of your prescribed medications? No   Is the patient taking medications as prescribed? yes   Who is going to help you get home at discharge? Family   How do you get to doctors appointments? public transportation   Are you on dialysis? No   DME Needed Upon Discharge  none   Discharge Plan discussed with: Patient   Transition of Care Barriers None   Discharge Plan A Home with family   OTHER   Name(s) of People in Home Brother & Nephew     Pt  with 9 children; Good fly support; Analisa Wilson, is a nurse; Pt independent with ADL's; Receives SS income; Anticipate d/c today with no new needs.

## 2023-09-02 ENCOUNTER — HOSPITAL ENCOUNTER (EMERGENCY)
Facility: HOSPITAL | Age: 68
Discharge: HOME | End: 2023-09-02
Attending: INTERNAL MEDICINE
Payer: MEDICARE

## 2023-09-02 VITALS
HEART RATE: 90 BPM | TEMPERATURE: 98 F | HEIGHT: 69 IN | OXYGEN SATURATION: 99 % | SYSTOLIC BLOOD PRESSURE: 132 MMHG | DIASTOLIC BLOOD PRESSURE: 98 MMHG | RESPIRATION RATE: 16 BRPM | BODY MASS INDEX: 29.34 KG/M2

## 2023-09-02 DIAGNOSIS — R60.0 BILATERAL LOWER EXTREMITY EDEMA: Primary | ICD-10-CM

## 2023-09-02 LAB
ALBUMIN SERPL-MCNC: 3.5 G/DL (ref 3.4–4.8)
ALBUMIN/GLOB SERPL: 1 RATIO (ref 1.1–2)
ALP SERPL-CCNC: 53 UNIT/L (ref 40–150)
ALT SERPL-CCNC: 16 UNIT/L (ref 0–55)
AST SERPL-CCNC: 20 UNIT/L (ref 5–34)
BASOPHILS # BLD AUTO: 0.06 X10(3)/MCL
BASOPHILS NFR BLD AUTO: 1 %
BILIRUB SERPL-MCNC: 1 MG/DL
BNP BLD-MCNC: 196.7 PG/ML
BUN SERPL-MCNC: 13.4 MG/DL (ref 8.4–25.7)
CALCIUM SERPL-MCNC: 9.4 MG/DL (ref 8.8–10)
CHLORIDE SERPL-SCNC: 110 MMOL/L (ref 98–107)
CO2 SERPL-SCNC: 21 MMOL/L (ref 23–31)
CREAT SERPL-MCNC: 1.07 MG/DL (ref 0.73–1.18)
EOSINOPHIL # BLD AUTO: 0.1 X10(3)/MCL (ref 0–0.9)
EOSINOPHIL NFR BLD AUTO: 1.6 %
ERYTHROCYTE [DISTWIDTH] IN BLOOD BY AUTOMATED COUNT: 14.1 % (ref 11.5–17)
GFR SERPLBLD CREATININE-BSD FMLA CKD-EPI: >60 MLS/MIN/1.73/M2
GLOBULIN SER-MCNC: 3.4 GM/DL (ref 2.4–3.5)
GLUCOSE SERPL-MCNC: 110 MG/DL (ref 82–115)
HCT VFR BLD AUTO: 41.9 % (ref 42–52)
HGB BLD-MCNC: 13.7 G/DL (ref 14–18)
IMM GRANULOCYTES # BLD AUTO: 0.03 X10(3)/MCL (ref 0–0.04)
IMM GRANULOCYTES NFR BLD AUTO: 0.5 %
LYMPHOCYTES # BLD AUTO: 2.14 X10(3)/MCL (ref 0.6–4.6)
LYMPHOCYTES NFR BLD AUTO: 34.4 %
MAGNESIUM SERPL-MCNC: 2 MG/DL (ref 1.6–2.6)
MCH RBC QN AUTO: 29.5 PG (ref 27–31)
MCHC RBC AUTO-ENTMCNC: 32.7 G/DL (ref 33–36)
MCV RBC AUTO: 90.3 FL (ref 80–94)
MONOCYTES # BLD AUTO: 0.61 X10(3)/MCL (ref 0.1–1.3)
MONOCYTES NFR BLD AUTO: 9.8 %
NEUTROPHILS # BLD AUTO: 3.28 X10(3)/MCL (ref 2.1–9.2)
NEUTROPHILS NFR BLD AUTO: 52.7 %
NRBC BLD AUTO-RTO: 0 %
PLATELET # BLD AUTO: 196 X10(3)/MCL (ref 130–400)
PMV BLD AUTO: 11.1 FL (ref 7.4–10.4)
POTASSIUM SERPL-SCNC: 3.4 MMOL/L (ref 3.5–5.1)
PROT SERPL-MCNC: 6.9 GM/DL (ref 5.8–7.6)
RBC # BLD AUTO: 4.64 X10(6)/MCL (ref 4.7–6.1)
SODIUM SERPL-SCNC: 140 MMOL/L (ref 136–145)
TROPONIN I SERPL-MCNC: <0.01 NG/ML (ref 0–0.04)
WBC # SPEC AUTO: 6.22 X10(3)/MCL (ref 4.5–11.5)

## 2023-09-02 PROCEDURE — 83735 ASSAY OF MAGNESIUM: CPT | Performed by: PHYSICIAN ASSISTANT

## 2023-09-02 PROCEDURE — 99285 EMERGENCY DEPT VISIT HI MDM: CPT | Mod: 25

## 2023-09-02 PROCEDURE — 80053 COMPREHEN METABOLIC PANEL: CPT | Performed by: PHYSICIAN ASSISTANT

## 2023-09-02 PROCEDURE — 85025 COMPLETE CBC W/AUTO DIFF WBC: CPT | Performed by: PHYSICIAN ASSISTANT

## 2023-09-02 PROCEDURE — 83880 ASSAY OF NATRIURETIC PEPTIDE: CPT | Performed by: PHYSICIAN ASSISTANT

## 2023-09-02 PROCEDURE — 93005 ELECTROCARDIOGRAM TRACING: CPT

## 2023-09-02 PROCEDURE — 25000003 PHARM REV CODE 250: Performed by: PHYSICIAN ASSISTANT

## 2023-09-02 PROCEDURE — 84484 ASSAY OF TROPONIN QUANT: CPT | Performed by: PHYSICIAN ASSISTANT

## 2023-09-02 RX ORDER — CLONIDINE HYDROCHLORIDE 0.1 MG/1
0.1 TABLET ORAL
Status: COMPLETED | OUTPATIENT
Start: 2023-09-02 | End: 2023-09-02

## 2023-09-02 RX ORDER — HYDROCODONE BITARTRATE AND ACETAMINOPHEN 7.5; 325 MG/1; MG/1
1 TABLET ORAL ONCE
Status: COMPLETED | OUTPATIENT
Start: 2023-09-02 | End: 2023-09-02

## 2023-09-02 RX ADMIN — HYDROCODONE BITARTRATE AND ACETAMINOPHEN 1 TABLET: 7.5; 325 TABLET ORAL at 11:09

## 2023-09-02 RX ADMIN — CLONIDINE HYDROCHLORIDE 0.1 MG: 0.1 TABLET ORAL at 11:09

## 2023-09-02 NOTE — ED PROVIDER NOTES
Encounter Date: 9/2/2023       History     Chief Complaint   Patient presents with    Leg Swelling     C/o worsening edema to legs since yesterday. Non compliance with meds x 2 days. Left lung sounds decreased. Denies cough or sob     69 yo M w/ PMHx significant for AF, CAD, MI, chronic b/l LE edema, HTN, HLD, DVT on chronic ACs & small bowel CA presents to ED c/o 1 day hx of worsened edema to b/l LEs w/ associated pain. Patient reports he has not taken his meds in last 2 days. Reports he forgot to take yesterday & didn't take this AM because he wanted to hurry to get to ED. Hypertensive on arrival w/ BP of 163/103, vitals otherwise stable. Denies CP, SOB, orthopnea, palpitations, diaphoresis, syncope, hemoptysis, cough, HA, dizziness, F/C, oliguria, abdominal distension.      Review of patient's allergies indicates:  No Known Allergies  Past Medical History:   Diagnosis Date    Arthritis     Atrial fibrillation     BPH (benign prostatic hyperplasia)     Cardiac arrest     Coronary artery disease     Cyst, kidney, acquired     Diverticulosis     Hyperlipidemia     Hypertension     MI (myocardial infarction)     Obesity     Steatosis of liver      Past Surgical History:   Procedure Laterality Date    A-V CARDIAC PACEMAKER INSERTION Right     CARDIAC CATHETERIZATION      COLONOSCOPY W/ BIOPSIES      excision of colon       Family History   Problem Relation Age of Onset    Hypertension Mother     Hypertension Father     Hypertension Sister      Social History     Tobacco Use    Smoking status: Former    Smokeless tobacco: Never   Substance Use Topics    Alcohol use: Not Currently    Drug use: Not Currently     Review of Systems   All other systems reviewed and are negative.      Physical Exam     Initial Vitals [09/02/23 1116]   BP Pulse Resp Temp SpO2   (!) 163/103 66 20 97.7 °F (36.5 °C) 99 %      MAP       --         Physical Exam    Nursing note and vitals reviewed.  Constitutional: He appears well-developed and  well-nourished. He is not diaphoretic. No distress.   HENT:   Head: Normocephalic and atraumatic.   Eyes: Conjunctivae and EOM are normal. Pupils are equal, round, and reactive to light.   Neck: Neck supple. No JVD present.   Normal range of motion.  Cardiovascular:  Normal rate, regular rhythm, normal heart sounds and intact distal pulses.     Exam reveals no gallop and no friction rub.       No murmur heard.  Pulmonary/Chest: Breath sounds normal. No respiratory distress. He has no wheezes. He has no rhonchi. He has no rales.   Abdominal: Abdomen is soft. Bowel sounds are normal. He exhibits no distension. There is no abdominal tenderness. There is no rebound and no guarding.   Musculoskeletal:         General: Edema present. No tenderness. Normal range of motion.      Cervical back: Normal range of motion and neck supple.      Right lower le+ Pitting Edema present.      Left lower le+ Pitting Edema present.      Comments: Negative corona's sign b/l     Neurological: He is alert and oriented to person, place, and time. No cranial nerve deficit.   Skin: Skin is warm and dry. Capillary refill takes less than 2 seconds. No erythema. No pallor.   Psychiatric: He has a normal mood and affect.         ED Course   Procedures  Labs Reviewed   COMPREHENSIVE METABOLIC PANEL - Abnormal; Notable for the following components:       Result Value    Potassium Level 3.4 (*)     Chloride 110 (*)     Carbon Dioxide 21 (*)     Albumin/Globulin Ratio 1.0 (*)     All other components within normal limits   B-TYPE NATRIURETIC PEPTIDE - Abnormal; Notable for the following components:    Natriuretic Peptide 196.7 (*)     All other components within normal limits   CBC WITH DIFFERENTIAL - Abnormal; Notable for the following components:    RBC 4.64 (*)     Hgb 13.7 (*)     Hct 41.9 (*)     MCHC 32.7 (*)     MPV 11.1 (*)     All other components within normal limits   TROPONIN I - Normal   MAGNESIUM - Normal   CBC W/ AUTO  DIFFERENTIAL    Narrative:     The following orders were created for panel order CBC Auto Differential.  Procedure                               Abnormality         Status                     ---------                               -----------         ------                     CBC with Differential[[id number]]        Abnormal            Final result                 Please view results for these tests on the individual orders.     EKG Readings: (Independently Interpreted)   Initial Reading: No STEMI. Previous EKG: Compared with most recent EKG Previous EKG Date: 8/26/23. Rhythm: Atrial Flutter. Heart Rate: 110. Conduction: Normal. Axis: Normal. Clinical Impression: Atrial Flutter     ECG Results              EKG 12-lead (In process)  Result time 09/02/23 12:13:55      In process by Interface, Lab In [facility] (09/02/23 12:13:55)                   Narrative:    Test Reason : R60.0,    Vent. Rate : 110 BPM     Atrial Rate : 394 BPM     P-R Int : 000 ms          QRS Dur : 084 ms      QT Int : 340 ms       P-R-T Axes : 000 083 259 degrees     QTc Int : 460 ms    Atrial flutter with variable A-V block  ST and T wave abnormality, consider inferolateral ischemia  Abnormal ECG  When compared with ECG of 26-AUG-2023 19:34,  Questionable change in The axis    Referred By: [email or web address]   SELF           Confirmed By:                                   Imaging Results              X-Ray Chest PA And Lateral (Final result)  Result time 09/02/23 12:13:14      Final result by Rico Ornelas MD (09/02/23 12:13:14)                   Impression:      No acute abnormality.      Electronically signed by: Rico Ornelas MD  Date:    09/02/2023  Time:    12:13               Narrative:    EXAMINATION:  XR CHEST PA AND LATERAL    CLINICAL HISTORY:  edema;    TECHNIQUE:  PA and lateral views of the chest were performed.    COMPARISON:  08/26/2023    FINDINGS:  The lungs are clear, with normal appearance of pulmonary vasculature and no pleural effusion  or pneumothorax.    The cardiac silhouette is normal in size. The hilar and mediastinal contours are unremarkable.    Bones are intact. Right chest wall AICD is unchanged in position.                                       Medications   HYDROcodone-acetaminophen 7.5-325 mg per tablet 1 tablet (1 tablet Oral Given 9/2/23 1137)   cloNIDine tablet 0.1 mg (0.1 mg Oral Given 9/2/23 1137)     Medical Decision Making  DDx: CHF, venous insufficiency, DVT, ACS among other possibilities    ED management: no acute intervention indicated    ED course: BNP only mildly elevated, no evidence of fluid overload on CXR, patient is not in acute CHF. Negative Dwight's sign b/l & no calf tenderness or size discrepancy between lower legs; low suspicion for DVT. EKG nonischemic, troponin WNL, patient denies CP or SOB; low suspicion for ACS. CMP & CBC unremarkable. Patient nontoxic appearing w/ unremarkable vitals & no signs of respiratory or other distress, stable for discharge. Instructed to contact PCP on Tuesday for close follow-up. Discussed importance of med compliance. ED precautions given for new or worsening symptoms & patient verbalized understanding.    Amount and/or Complexity of Data Reviewed  Labs: ordered. Decision-making details documented in ED Course.  Radiology: ordered and independent interpretation performed. Decision-making details documented in ED Course.  ECG/medicine tests: ordered and independent interpretation performed. Decision-making details documented in ED Course.    Risk  Prescription drug management.                               Clinical Impression:   Final diagnoses:  [R60.0] Bilateral lower extremity edema (Primary)        ED Disposition Condition    Discharge Good          ED Prescriptions    None       Follow-up Information       Follow up With Specialties Details Why Contact Info    Magdi Rivera, DO Internal Medicine Call on 9/5/2023  [address] [facility] [address]  [phone]      [facility]  [place] [facility] Emergency Dept Emergency Medicine  As needed, If symptoms worsen [address] [facility] [address]  [phone]             Eugenio Singer PA  09/02/23 1234

## 2023-09-03 NOTE — PROGRESS NOTES
History:  Past Medical History:   Diagnosis Date    Arthritis     Atrial fibrillation     BPH (benign prostatic hyperplasia)     Cardiac arrest     Coronary artery disease     Cyst, kidney, acquired     Diverticulosis     Hyperlipidemia     Hypertension     MI (myocardial infarction)     Obesity     Steatosis of liver    Past medical history: Atrial fibrillation.  Coronary artery disease.  History of cardiac arrest with ventricular fibrillation.  Hypertension.  Dyslipidemia.  MI in 2003.  Obesity.  Status post CPR 03/08/2018. 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 HFpEF (EF >55% on echo 11/2018).  History of second-degree AV block requiring PPM.  Social history: Single.  Has 9 children.  Lives in [place].  Used to work as a  at [employer].  Smoked up to 5 packs of cigarettes daily for 10-20 years; quit in 1970s.  Used to drink vodka every weekend until he got drunk; drank for about 35 years, then quit.  Used to smoke marijuana.  Family history: Negative for malignancy.  Health maintenance: He is unsure when he had last colonoscopy performed, probably 10 years back, at [facility], apparently unremarkable.   Past Surgical History:   Procedure Laterality Date    A-V CARDIAC PACEMAKER INSERTION Right     CARDIAC CATHETERIZATION      COLONOSCOPY W/ BIOPSIES      excision of colon        Social History     Socioeconomic History    Marital status: Single   Tobacco Use    Smoking status: Former    Smokeless tobacco: Never   Substance and Sexual Activity    Alcohol use: Not Currently    Drug use: Not Currently    Sexual activity: Not Currently     Social Determinants of Health     Financial Resource Strain: Low Risk  (10/19/2022)    Overall Financial Resource Strain (CARDIA)     Difficulty of Paying Living Expenses: Not hard at all   Food Insecurity: No Food Insecurity (10/19/2022)    Hunger Vital Sign     Worried About Running Out of Food in the Last Year: Never true     Ran Out of Food in the Last Year:  Never true   Transportation Needs: No Transportation Needs (10/19/2022)    PRAPARE - Transportation     Lack of Transportation (Medical): No     Lack of Transportation (Non-Medical): No   Physical Activity: Sufficiently Active (10/19/2022)    Exercise Vital Sign     Days of Exercise per Week: 6 days     Minutes of Exercise per Session: 60 min   Stress: No Stress Concern Present (10/19/2022)    [employer] - Occupational Stress Questionnaire     Feeling of Stress : Not at all   Social Connections: Unknown (10/19/2022)    Social Connection and Isolation Panel [NHANES]     Frequency of Communication with Friends and Family: More than three times a week     Frequency of Social Gatherings with Friends and Family: More than three times a week     Attends Oriental orthodox Services: More than 4 times per year     Active Member of Clubs or Organizations: No     Attends Club or Organization Meetings: Never   Housing Stability: Low Risk  (10/19/2022)    Housing Stability Vital Sign     Unable to Pay for Housing in the Last Year: No     Number of Places Lived in the Last Year: 1     Unstable Housing in the Last Year: No      Family History   Problem Relation Age of Onset    Hypertension Mother     Hypertension Father     Hypertension Sister         Reason for Follow-up:  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     Oncologic history:  # pT3,4 pN2 MX, at least  stage III, well-differentiated neuroendocrine tumor of small bowel, multifocal, grade 1; large mesenteric mass (3.8 cm); 2:21 lymph nodes involved; small multifocal areas of tumor in the efren-intestinal adipose tissue.   Presented with small bowel obstruction.  Status post resection of 127 cm of small bowel and mesenteric mass in the base of the mesentery, on 11/02/2018.   -Our request for contrast-enhanced chest CT for staging, was denied   -12/26/2018: CT A/P with contrast: 25 mm area of mesenteric soft tissue may reflect residual mass or postsurgical change; 2 millimetric hepatic lesions are too small to characterize, stable from October 2018, suggest close attention on follow-up.   -12/27/2018: Colonoscopy: 3 mm hyperplastic sigmoid polyp; no malignancy   -12/10/2018: Chromogranin A level normal   -12/12/2018: 24-hour urine 5-HIAA level normal   -02/15/2019: Whole-body Ga-dotatate PET/CT: No findings to suggest somatostatin receptor avid disease   -05/26/2019-05/29/2019: Brief hospitalization with early small bowel obstruction, managed conservatively   -05/28/2019: Small bowel follow-through: Prompt passage of contrast through the small bowel.  Contrast passed quickly through small bowel and reached colon within 15 minutes; further contrast progression to rectum within 1 hour.  No discrete small bowel transition point.   -06/28/2019: Octreotide scan: No evidence of neuroendocrine tumor   -07/11/2019: X-ray abdomen flat and erect (abdominal pain): No acute intra-abdominal abnormality  -11/01/2019: Multiphasic contrast-enhanced CTs abdomen and pelvis and chest CT with contrast for surveillance: No evidence of residual, recurrent, or metastatic disease in chest, abdomen, or pelvis  -02/03/2020: TTE: LVEF 55%; atrial fibrillation  -Follows up with cardiology for history of hypertension, dyslipidemia, CAD, history of NSTEMI in 2003, paroxysmal atrial fibrillation, HFpEF, second-degree AV block s/p [facility] PPM  11/13/2017 (upgraded to dual-chamber ICD 05/2018 secondary to V. fib arrest in 03/2018 in the setting of prolonged QT and hypokalemia)  -05/03/2021: Surveillance CT C/A/P with contrast (comparison: 02/02/2020): No new suspicious findings in C/A/P; decreased size of previously discussed mediastinal lymph nodes (AP window lymph nodes 6 mm, previously 8 mm)  -01/07/2022: CT A/P with contrast (comparison: 05/03/2021): Hepatic steatosis; small gallstones; retroperitoneal mildly enlarged lymph node, unchanged  -07/07/2022:  Surveillance CTs C/A/P with contrast (comparison:  CT A/P 01/07/2022; CT C/A/P 05/03/2021):  No recurrence or metastasis  -09/07/2022:  CT abdomen pelvis with contrast (abdominal pain) (comparison:  CT 07/07/2022):  -10/10/2022: Noncontrast chest CT (comparison:  07/07/2022):  No acute process identified  1. Interval resolution of previous left lower lobe ground-glass nodule, suggestive of infectious or inflammatory etiology.  2. No evidence of new or worsening intrathoracic process.         # History of coronary artery disease, atrial fibrillation, history of cardiac arrest with ventricular fibrillation, MI in 2003, history of previous DVTs, status post CPR 03/08/2018, on anticoagulation for history of DVTs and atrial fibrillation, history of second-degree AV block requiring PPM.        12/10/2018:   Presents for initial oncology consultation, accompanied by his 3 sisters.  Overall, doing well except for postoperative abdominal pain, 4 on a scale of 1-10, which keeps improving.  Mild fatigue.  No symptoms of carcinoid syndrome like flushing, diarrhea, or bronchoconstriction.  No weakness, fatigue, malaise, fevers, chills, anorexia, unintentional weight loss, nausea, vomiting, hematemesis, melena, hematochezia, etc.    Interval History:  [No matching plan found]   [No matching plan found]   09/05/2023:   -08/15/2023:  CV ultrasound Doppler venous legs bilateral:  No evidence of DVT bilateral lower  extremities  -08/15/2023: TTE:  LVEF 55-60%, atrial fibrillation, dilated left atrium, normal RV cavity size, mild mitral regurgitation  -08/23/2023: CT C/A/P with contrast for surveillance (comparison:  CTs C/A/P 0 07/03/2023):   No evidence of metastatic disease in C/A/P  Labs reviewed.  Potassium 3.4, low.  Presents for follow-up visit.  Overall, doing well.  No complaints.  Some leg swelling which, he says, is much better than before.  Intermittent pain in the legs, 8/10 severity.  Ambulates with the help of persistent.  No chest pain, cough, or dyspnea.  No fevers or chills.  No abdominal pain, nausea, vomiting.      Medications:  Current Outpatient Medications on File Prior to Visit   Medication Sig Dispense Refill    aspirin 81 MG Chew chew and swallow 1 tablet by mouth daily 90 tablet 3    atorvastatin (LIPITOR) 40 MG tablet Take 1 tablet (40 mg total) by mouth once daily. 90 tablet 1    diltiaZEM (CARDIZEM CD) 360 MG 24 hr capsule Take 1 capsule (360 mg total) by mouth once daily. 90 capsule 1    hyoscyamine (ANASPAZ,LEVSIN) 0.125 mg Tab Take 125 mcg by mouth every 6 (six) hours as needed.      losartan (COZAAR) 50 MG tablet Take 1 tablet (50 mg total) by mouth once daily. 90 tablet 1    metoprolol succinate (TOPROL-XL) 200 MG 24 hr tablet Take 1 tablet (200 mg total) by mouth 2 (two) times daily. 180 tablet 1    NIFEdipine (PROCARDIA-XL) 30 MG (OSM) 24 hr tablet Take 30 mg by mouth.      spironolactone (ALDACTONE) 50 MG tablet Take 1 tablet (50 mg total) by mouth once daily. 90 tablet 1    traMADoL (ULTRAM) 50 mg tablet Take 1 tablet (50 mg total) by mouth every 6 (six) hours as needed for Pain. 12 tablet 0    vitamin D (VITAMIN D3) 1000 units Tab Take 1 tablet (1,000 Units total) by mouth once daily. 30 tablet 1    XARELTO 20 mg Tab TAKE 1 TABLET BY MOUTH DAILY with SUPPER 90 tablet 3    albuterol (PROVENTIL/VENTOLIN HFA) 90 mcg/actuation inhaler Inhale 2 puffs into the lungs every 6 (six) hours as needed  "for Wheezing. Rescue (Patient not taking: Reported on 8/22/2023) 8.5 g 0    cetirizine (ZYRTEC) 10 MG tablet Take 1 tablet (10 mg total) by mouth once daily. for 14 days 14 tablet 0    famotidine (PEPCID) 20 MG tablet Take 1 tablet (20 mg total) by mouth 2 (two) times daily. for 10 days (Patient not taking: Reported on 8/22/2023) 20 tablet 0     No current facility-administered medications on file prior to visit.       Review of Systems:   All systems reviewed and found to be negative except for the symptoms detailed above    Physical Examination:   VITAL SIGNS:   Vitals:    09/05/23 0901   BP: (!) 148/96   Pulse: 73   Resp: 20   Temp: 98.6 °F (37 °C)       GENERAL:  In no apparent distress.    HEAD:  No signs of head trauma.  EYES:  Pupils are equal.  Extraocular motions intact.    EARS:  Hearing grossly intact.  MOUTH:  Oropharynx is normal.   NECK:  No adenopathy, no JVD.     CHEST:  Chest with clear breath sounds bilaterally.  No wheezes, rales, rhonchi.    CARDIAC:  Regular rate and rhythm.  S1 and S2, without murmurs, gallops, rubs.  VASCULAR:  No Edema.  Peripheral pulses normal and equal in all extremities.  ABDOMEN:  Soft, without detectable tenderness.  No sign of distention.  No   rebound or guarding, and no masses palpated.   Bowel Sounds normal.  MUSCULOSKELETAL:  Good range of motion of all major joints. Extremities without clubbing, cyanosis or edema.    NEUROLOGIC EXAM:  Alert and oriented x 3.  No focal sensory or strength deficits.   Speech normal.  Follows commands.  PSYCHIATRIC:  Mood normal.    No results for input(s): "CBC" in the last 72 hours.   No results for input(s): "CMP" in the last 72 hours.     Assessment:  Problem List Items Addressed This Visit          Cardiac/Vascular    Hypertension    Cardiac arrest with ventricular fibrillation    Cardiac pacemaker in situ    Atrial fibrillation with rapid ventricular response       Hematology    History of deep vein thrombosis (DVT) of lower " extremity    Current use of long term anticoagulation       Oncology    Neuroendocrine carcinoma of small bowel - Primary     Well-differentiated neuroendocrine tumor of small bowel:  -presented with small-bowel obstruction   -S/P resection of 127 cm of small bowel and mesenteric mass at the base of mesentery, 11/02/2018  -pT3/4 pN2 MX, at least stage III, well-differentiated neuroendocrine tumor of small bowel, multifocal, grade 1; large mesenteric mass (3.8 cm); 2:21 lymph nodes involved; small multifocal areas of tumor in the efren-intestinal adipose tissue  -Colonoscopy (12/27/2018): 3 mm hyperplastic sigmoid polyp  -No somatostatin receptor avid disease on whole-body gallium dotatate PET/CT (02/15/2019)  -No evidence of neuroendocrine tumor on octreotide scan (06/28/2019)  -No evidence of disease on surveillance CTs C/A/P 11/01/2019  -no recurrence or metastasis on surveillance CTs 07/07/2022  -no recurrence on CT A/P with contrast 09/07/2022        History of coronary artery disease, atrial fibrillation  History of cardiac arrest with ventricular fibrillation, MI in 2003  History of previous DVTs  S/P CPR 03/08/2018  On anticoagulation for history of DVTs and atrial fibrillation  History of second-degree AV block requiring PPM.        Plan:  Potassium 3.4, low.    Check magnesium level   Start potassium chloride 20 mEq p.o. q.day x2 weeks; no refills   In 2 weeks, recheck CMP and magnesium level  In August 2024, surveillance multiphasic CT scans of A/P with contrast, and contrast-enhanced CT scan of chest, CBC, CMP, then follow-up visit.  -------------------------      Potassium 3.4, low.    Check magnesium level   Start potassium chloride 20 mEq p.o. q.day x2 weeks; no refills   In 2 weeks, recheck CMP and magnesium level    -S/P resection of small bowel and mesenteric mass 11/02/2018  >>>  -continue surveillance  -From 1 year post resection up to 10 years (11/2018-11/2028), every 12-24 months: History and  physical, consider biochemical markers, consider abdominal +/-pelvic multiphasic CT or MRI with contrast, consider CT chest without contrast  >>>  -JESSIE as of 07/07/2022, 09/07/2022  -JESSIE on surveillance CTs C/A/P with contrast 08/23/2023  >>>  Continue annual surveillance   In 1 year (08/2024), surveillance multiphasic CT scans of abdomen pelvis with contrast, and contrast-enhanced CT scan of chest, CBC, CMP; tumor markers if needed    Follow-up in 1 year, with labs and scans.     Above discussed with him.  All questions answered.  Discussed labs and scans and gave him copies of relevant reports.  He understands and agrees with this plan.  ----------------------     Surveillance:   Underwent resection on 11/02/2018   1.  3-12 months post resection (until 11/2019):   -History and physical   -Consider biochemical markers as clinically indicated   -Abdominal with or without pelvic multiphasic CT or MRI with IV contrast, as clinically indicated   -Chest CT with and without contrast for primary lung/thymus tumors (as clinically indicated for primary GI tumors)      2.  >1-year post resection to 10 years:   Every 12-24 months:   -History and physical   -Consider biochemical markers as clinically indicated   -Consider abdominal with or without pelvic multiphasic CT or MRI with contrast   -Consider chest CT with and without contrast for primary lung/thymus tumors (as clinically indicated for primary GI tumors)      3. >10 years:   Consider surveillance as clinically indicated       Follow-up:  No follow-ups on file.

## 2023-09-04 ENCOUNTER — HOSPITAL ENCOUNTER (EMERGENCY)
Facility: HOSPITAL | Age: 68
Discharge: HOME | End: 2023-09-04
Attending: FAMILY MEDICINE
Payer: MEDICARE

## 2023-09-04 VITALS
SYSTOLIC BLOOD PRESSURE: 115 MMHG | OXYGEN SATURATION: 97 % | HEART RATE: 61 BPM | TEMPERATURE: 98 F | RESPIRATION RATE: 20 BRPM | DIASTOLIC BLOOD PRESSURE: 87 MMHG

## 2023-09-04 DIAGNOSIS — R60.0 BILATERAL LOWER EXTREMITY EDEMA: Primary | ICD-10-CM

## 2023-09-04 PROCEDURE — 99284 EMERGENCY DEPT VISIT MOD MDM: CPT | Mod: 25

## 2023-09-04 PROCEDURE — 63600175 PHARM REV CODE 636 W HCPCS: Performed by: FAMILY MEDICINE

## 2023-09-04 PROCEDURE — 96374 THER/PROPH/DIAG INJ IV PUSH: CPT

## 2023-09-04 PROCEDURE — 25000003 PHARM REV CODE 250: Performed by: FAMILY MEDICINE

## 2023-09-04 RX ORDER — HYDROCODONE BITARTRATE AND ACETAMINOPHEN 7.5; 325 MG/1; MG/1
1 TABLET ORAL
Status: COMPLETED | OUTPATIENT
Start: 2023-09-04 | End: 2023-09-04

## 2023-09-04 RX ORDER — FUROSEMIDE 10 MG/ML
40 INJECTION INTRAMUSCULAR; INTRAVENOUS
Status: COMPLETED | OUTPATIENT
Start: 2023-09-04 | End: 2023-09-04

## 2023-09-04 RX ADMIN — HYDROCODONE BITARTRATE AND ACETAMINOPHEN 1 TABLET: 7.5; 325 TABLET ORAL at 05:09

## 2023-09-04 RX ADMIN — FUROSEMIDE 40 MG: 10 INJECTION, SOLUTION INTRAMUSCULAR; INTRAVENOUS at 05:09

## 2023-09-04 NOTE — ED PROVIDER NOTES
Encounter Date: 9/4/2023       History     Chief Complaint   Patient presents with    Leg Swelling    Leg Pain     Patient is a 68-year-old gentleman presents emergency room complaints of bilateral lower extremity pain and swelling.  Patient reports he has been having swelling for the last 2 days.  Reports pain to his legs.  Patient reports he was unsure why he was having swelling of his lower extremities, reports he has a clinic visit with his primary care physician's tomorrow.  Reports compliance with his medications.  Denies fever chills.  Denies nausea or vomiting.    The history is provided by the patient.     Review of patient's allergies indicates:  No Known Allergies  Past Medical History:   Diagnosis Date    Arthritis     Atrial fibrillation     BPH (benign prostatic hyperplasia)     Cardiac arrest     Coronary artery disease     Cyst, kidney, acquired     Diverticulosis     Hyperlipidemia     Hypertension     MI (myocardial infarction)     Obesity     Steatosis of liver      Past Surgical History:   Procedure Laterality Date    A-V CARDIAC PACEMAKER INSERTION Right     CARDIAC CATHETERIZATION      COLONOSCOPY W/ BIOPSIES      excision of colon       Family History   Problem Relation Age of Onset    Hypertension Mother     Hypertension Father     Hypertension Sister      Social History     Tobacco Use    Smoking status: Former    Smokeless tobacco: Never   Substance Use Topics    Alcohol use: Not Currently    Drug use: Not Currently     Review of Systems   Constitutional:  Negative for chills, fatigue and fever.   HENT:  Negative for ear pain, rhinorrhea and sore throat.    Eyes:  Negative for photophobia and pain.   Respiratory:  Negative for cough, shortness of breath and wheezing.    Cardiovascular:  Negative for chest pain.   Gastrointestinal:  Negative for abdominal pain, diarrhea, nausea and vomiting.   Genitourinary:  Negative for dysuria.   Neurological:  Negative for dizziness, weakness and  headaches.   All other systems reviewed and are negative.      Physical Exam     Initial Vitals   BP Pulse Resp Temp SpO2   09/04/23 0504 09/04/23 0504 09/04/23 0504 09/04/23 0507 09/04/23 0504   129/85 78 20 97.8 °F (36.6 °C) 97 %      MAP       --                Physical Exam    Nursing note and vitals reviewed.  Constitutional: He appears well-developed and well-nourished.   HENT:   Head: Normocephalic and atraumatic.   Eyes: EOM are normal. Pupils are equal, round, and reactive to light.   Neck: Neck supple.   Normal range of motion.  Cardiovascular:  Normal rate, regular rhythm, normal heart sounds and intact distal pulses.     Exam reveals no gallop and no friction rub.       No murmur heard.  Pulmonary/Chest: Breath sounds normal. No respiratory distress.   Abdominal: Abdomen is soft. Bowel sounds are normal. He exhibits no distension. There is no abdominal tenderness.   Musculoskeletal:         General: Edema present. Normal range of motion.      Cervical back: Normal range of motion and neck supple.      Comments: 3+ bilateral lower extremity edema, 2+ dorsalis pedis pulses bilaterally     Neurological: He is alert and oriented to person, place, and time. He has normal strength.   Skin: Skin is warm and dry.   Psychiatric: He has a normal mood and affect. His behavior is normal. Judgment and thought content normal.         ED Course   Procedures  Labs Reviewed - No data to display       Imaging Results    None          Medications   HYDROcodone-acetaminophen 7.5-325 mg per tablet 1 tablet (1 tablet Oral Given 9/4/23 0536)   furosemide injection 40 mg (40 mg Intravenous Given 9/4/23 0536)     Medical Decision Making  Patient is a 68-year-old gentleman with a history of atrial fibrillation (currently on Xarelto), hypertension, hyperlipidemia, coronary artery disease with congestive heart failure with preserved ejection fraction presents emergency room with complaints of lower extremity swelling.  On review of  the medical record, patient was recently hospitalized for lower extremity swelling, discharged on 08/28/23 (7 days prior), patient seen in the emergency room 2 days prior with complaints of lower extremity pain and swelling, and had has been taking his medications since discharge from the hospital.  Today patient has presenting with continued lower extremity edema, but reports compliance with medications.  Currently normotensive with a blood pressure of 129/85, pulse rate of 78, respiratory rate of 20, and oxygen saturation of 97%.  When patient was here in the emergency room 2 days ago, he would laboratory evaluation performed which did not show any acute abnormality.  Patient often presents emergency room with complaints of lower extremity edema as well as pain.  On review of the patient's medical record, he actually has an oncology appointment tomorrow, followed by a cardiology clinic appointment in 2 days.  Patient does have lower extremity edema, but otherwise appears stable.  Discussed with patient, and will give a dose of Lasix IV along with a Norco pain medication and stable for discharge to home.  Patient encouraged to keep follow-up clinic appointments.  ER precautions given for any acute worsening.    Risk  Prescription drug management.                               Clinical Impression:   Final diagnoses:  [R60.0] Bilateral lower extremity edema (Primary)        ED Disposition Condition    Discharge Stable          ED Prescriptions    None       Follow-up Information       Follow up With Specialties Details Why Contact Info    Magdi Rivera, DO Internal Medicine   [address] [facility] [address]  [phone]      [facility] Dept Emergency Medicine  As needed, If symptoms worsen [address] [facility] [id number] [phone]             Eduardo Hernandez MD  09/04/23 1725

## 2023-09-05 ENCOUNTER — CLINICAL SUPPORT (OUTPATIENT)
Dept: HEMATOLOGY/ONCOLOGY | Facility: CLINIC | Age: 68
End: 2023-09-05
Payer: MEDICARE

## 2023-09-05 ENCOUNTER — OFFICE VISIT (OUTPATIENT)
Dept: HEMATOLOGY/ONCOLOGY | Facility: CLINIC | Age: 68
End: 2023-09-05
Attending: INTERNAL MEDICINE
Payer: MEDICARE

## 2023-09-05 VITALS
DIASTOLIC BLOOD PRESSURE: 96 MMHG | OXYGEN SATURATION: 100 % | WEIGHT: 246 LBS | SYSTOLIC BLOOD PRESSURE: 148 MMHG | BODY MASS INDEX: 35.22 KG/M2 | HEIGHT: 70 IN | HEART RATE: 73 BPM | RESPIRATION RATE: 20 BRPM | TEMPERATURE: 99 F

## 2023-09-05 DIAGNOSIS — C7A.8 NEUROENDOCRINE CARCINOMA OF SMALL BOWEL: Primary | ICD-10-CM

## 2023-09-05 DIAGNOSIS — Z79.01 CURRENT USE OF LONG TERM ANTICOAGULATION: ICD-10-CM

## 2023-09-05 DIAGNOSIS — I49.01 CARDIAC ARREST WITH VENTRICULAR FIBRILLATION: ICD-10-CM

## 2023-09-05 DIAGNOSIS — I46.9 CARDIAC ARREST WITH VENTRICULAR FIBRILLATION: ICD-10-CM

## 2023-09-05 DIAGNOSIS — Z95.0 CARDIAC PACEMAKER IN SITU: ICD-10-CM

## 2023-09-05 DIAGNOSIS — I48.91 ATRIAL FIBRILLATION WITH RAPID VENTRICULAR RESPONSE: ICD-10-CM

## 2023-09-05 DIAGNOSIS — I10 HYPERTENSION, UNSPECIFIED TYPE: ICD-10-CM

## 2023-09-05 DIAGNOSIS — Z86.718 HISTORY OF DEEP VEIN THROMBOSIS (DVT) OF LOWER EXTREMITY: ICD-10-CM

## 2023-09-05 LAB
ALBUMIN SERPL-MCNC: 3.8 G/DL (ref 3.4–4.8)
ALBUMIN/GLOB SERPL: 1 RATIO (ref 1.1–2)
ALP SERPL-CCNC: 68 UNIT/L (ref 40–150)
ALT SERPL-CCNC: 26 UNIT/L (ref 0–55)
AST SERPL-CCNC: 23 UNIT/L (ref 5–34)
BASOPHILS # BLD AUTO: 0.06 X10(3)/MCL
BASOPHILS NFR BLD AUTO: 0.6 %
BILIRUB SERPL-MCNC: 1 MG/DL
BUN SERPL-MCNC: 15.3 MG/DL (ref 8.4–25.7)
CALCIUM SERPL-MCNC: 9.4 MG/DL (ref 8.8–10)
CHLORIDE SERPL-SCNC: 105 MMOL/L (ref 98–107)
CO2 SERPL-SCNC: 25 MMOL/L (ref 23–31)
CREAT SERPL-MCNC: 1.2 MG/DL (ref 0.73–1.18)
EOSINOPHIL # BLD AUTO: 0.08 X10(3)/MCL (ref 0–0.9)
EOSINOPHIL NFR BLD AUTO: 0.8 %
ERYTHROCYTE [DISTWIDTH] IN BLOOD BY AUTOMATED COUNT: 14.1 % (ref 11.5–17)
GFR SERPLBLD CREATININE-BSD FMLA CKD-EPI: >60 MLS/MIN/1.73/M2
GLOBULIN SER-MCNC: 3.9 GM/DL (ref 2.4–3.5)
GLUCOSE SERPL-MCNC: 151 MG/DL (ref 82–115)
HCT VFR BLD AUTO: 42.6 % (ref 42–52)
HGB BLD-MCNC: 14 G/DL (ref 14–18)
IMM GRANULOCYTES # BLD AUTO: 0.06 X10(3)/MCL (ref 0–0.04)
IMM GRANULOCYTES NFR BLD AUTO: 0.6 %
LYMPHOCYTES # BLD AUTO: 2.11 X10(3)/MCL (ref 0.6–4.6)
LYMPHOCYTES NFR BLD AUTO: 20.9 %
MAGNESIUM SERPL-MCNC: 1.9 MG/DL (ref 1.6–2.6)
MCH RBC QN AUTO: 29.8 PG (ref 27–31)
MCHC RBC AUTO-ENTMCNC: 32.9 G/DL (ref 33–36)
MCV RBC AUTO: 90.6 FL (ref 80–94)
MONOCYTES # BLD AUTO: 0.94 X10(3)/MCL (ref 0.1–1.3)
MONOCYTES NFR BLD AUTO: 9.3 %
NEUTROPHILS # BLD AUTO: 6.84 X10(3)/MCL (ref 2.1–9.2)
NEUTROPHILS NFR BLD AUTO: 67.8 %
NRBC BLD AUTO-RTO: 0 %
PLATELET # BLD AUTO: 266 X10(3)/MCL (ref 130–400)
PMV BLD AUTO: 10.1 FL (ref 7.4–10.4)
POTASSIUM SERPL-SCNC: 3.4 MMOL/L (ref 3.5–5.1)
PROT SERPL-MCNC: 7.7 GM/DL (ref 5.8–7.6)
RBC # BLD AUTO: 4.7 X10(6)/MCL (ref 4.7–6.1)
SODIUM SERPL-SCNC: 138 MMOL/L (ref 136–145)
WBC # SPEC AUTO: 10.09 X10(3)/MCL (ref 4.5–11.5)

## 2023-09-05 PROCEDURE — 85025 COMPLETE CBC W/AUTO DIFF WBC: CPT

## 2023-09-05 PROCEDURE — 99215 OFFICE O/P EST HI 40 MIN: CPT | Mod: PBBFAC | Performed by: INTERNAL MEDICINE

## 2023-09-05 PROCEDURE — 3080F DIAST BP >= 90 MM HG: CPT | Mod: CPTII,,, | Performed by: INTERNAL MEDICINE

## 2023-09-05 PROCEDURE — 99213 PR OFFICE/OUTPT VISIT, EST, LEVL III, 20-29 MIN: ICD-10-PCS | Mod: S$PBB,,, | Performed by: INTERNAL MEDICINE

## 2023-09-05 PROCEDURE — 1125F PR PAIN SEVERITY QUANTIFIED, PAIN PRESENT: ICD-10-PCS | Mod: CPTII,,, | Performed by: INTERNAL MEDICINE

## 2023-09-05 PROCEDURE — 3044F HG A1C LEVEL LT 7.0%: CPT | Mod: CPTII,,, | Performed by: INTERNAL MEDICINE

## 2023-09-05 PROCEDURE — 3077F PR MOST RECENT SYSTOLIC BLOOD PRESSURE >= 140 MM HG: ICD-10-PCS | Mod: CPTII,,, | Performed by: INTERNAL MEDICINE

## 2023-09-05 PROCEDURE — 3044F PR MOST RECENT HEMOGLOBIN A1C LEVEL <7.0%: ICD-10-PCS | Mod: CPTII,,, | Performed by: INTERNAL MEDICINE

## 2023-09-05 PROCEDURE — 3288F PR FALLS RISK ASSESSMENT DOCUMENTED: ICD-10-PCS | Mod: CPTII,,, | Performed by: INTERNAL MEDICINE

## 2023-09-05 PROCEDURE — 3077F SYST BP >= 140 MM HG: CPT | Mod: CPTII,,, | Performed by: INTERNAL MEDICINE

## 2023-09-05 PROCEDURE — 36415 COLL VENOUS BLD VENIPUNCTURE: CPT

## 2023-09-05 PROCEDURE — 1101F PR PT FALLS ASSESS DOC 0-1 FALLS W/OUT INJ PAST YR: ICD-10-PCS | Mod: CPTII,,, | Performed by: INTERNAL MEDICINE

## 2023-09-05 PROCEDURE — 4010F ACE/ARB THERAPY RXD/TAKEN: CPT | Mod: CPTII,,, | Performed by: INTERNAL MEDICINE

## 2023-09-05 PROCEDURE — 4010F PR ACE/ARB THEARPY RXD/TAKEN: ICD-10-PCS | Mod: CPTII,,, | Performed by: INTERNAL MEDICINE

## 2023-09-05 PROCEDURE — 3288F FALL RISK ASSESSMENT DOCD: CPT | Mod: CPTII,,, | Performed by: INTERNAL MEDICINE

## 2023-09-05 PROCEDURE — 80053 COMPREHEN METABOLIC PANEL: CPT

## 2023-09-05 PROCEDURE — 3080F PR MOST RECENT DIASTOLIC BLOOD PRESSURE >= 90 MM HG: ICD-10-PCS | Mod: CPTII,,, | Performed by: INTERNAL MEDICINE

## 2023-09-05 PROCEDURE — 3008F PR BODY MASS INDEX (BMI) DOCUMENTED: ICD-10-PCS | Mod: CPTII,,, | Performed by: INTERNAL MEDICINE

## 2023-09-05 PROCEDURE — 1101F PT FALLS ASSESS-DOCD LE1/YR: CPT | Mod: CPTII,,, | Performed by: INTERNAL MEDICINE

## 2023-09-05 PROCEDURE — 83735 ASSAY OF MAGNESIUM: CPT

## 2023-09-05 PROCEDURE — 99213 OFFICE O/P EST LOW 20 MIN: CPT | Mod: S$PBB,,, | Performed by: INTERNAL MEDICINE

## 2023-09-05 PROCEDURE — 3008F BODY MASS INDEX DOCD: CPT | Mod: CPTII,,, | Performed by: INTERNAL MEDICINE

## 2023-09-05 PROCEDURE — 1125F AMNT PAIN NOTED PAIN PRSNT: CPT | Mod: CPTII,,, | Performed by: INTERNAL MEDICINE

## 2023-09-05 RX ORDER — POTASSIUM CHLORIDE 20 MEQ/1
40 TABLET, EXTENDED RELEASE ORAL DAILY
Qty: 28 TABLET | Refills: 0 | Status: SHIPPED | OUTPATIENT
Start: 2023-09-05 | End: 2023-09-19

## 2023-09-05 RX ORDER — NIFEDIPINE 30 MG/1
30 TABLET, EXTENDED RELEASE ORAL
COMMUNITY
Start: 2023-08-25 | End: 2023-10-16

## 2023-09-05 NOTE — Clinical Note
Potassium 3.4, low.   Check magnesium level  Start potassium chloride 20 mEq p.o. q.day x2 weeks; no refills  In 2 weeks, recheck CMP and magnesium level In August 2024, surveillance multiphasic CT scans of A/P with contrast, and contrast-enhanced CT scan of chest, CBC, CMP, then follow-up visit.

## 2023-09-07 ENCOUNTER — PATIENT MESSAGE (OUTPATIENT)
Dept: RESEARCH | Facility: HOSPITAL | Age: 68
End: 2023-09-07
Payer: MEDICARE

## 2023-09-07 ENCOUNTER — HOSPITAL ENCOUNTER (EMERGENCY)
Facility: HOSPITAL | Age: 68
Discharge: HOME | End: 2023-09-07
Attending: EMERGENCY MEDICINE
Payer: MEDICARE

## 2023-09-07 ENCOUNTER — HOSPITAL ENCOUNTER (EMERGENCY)
Facility: HOSPITAL | Age: 68
Discharge: HOME | End: 2023-09-07
Attending: STUDENT IN AN ORGANIZED HEALTH CARE EDUCATION/TRAINING PROGRAM
Payer: MEDICARE

## 2023-09-07 VITALS
SYSTOLIC BLOOD PRESSURE: 163 MMHG | TEMPERATURE: 98 F | HEART RATE: 85 BPM | WEIGHT: 242.5 LBS | BODY MASS INDEX: 35.81 KG/M2 | DIASTOLIC BLOOD PRESSURE: 103 MMHG | OXYGEN SATURATION: 98 % | RESPIRATION RATE: 16 BRPM

## 2023-09-07 VITALS
OXYGEN SATURATION: 98 % | HEIGHT: 69 IN | RESPIRATION RATE: 18 BRPM | HEART RATE: 78 BPM | DIASTOLIC BLOOD PRESSURE: 87 MMHG | TEMPERATURE: 98 F | SYSTOLIC BLOOD PRESSURE: 138 MMHG | BODY MASS INDEX: 34.96 KG/M2 | WEIGHT: 236 LBS

## 2023-09-07 DIAGNOSIS — R60.0 BILATERAL LOWER EXTREMITY EDEMA: Primary | ICD-10-CM

## 2023-09-07 DIAGNOSIS — R60.0 PERIPHERAL EDEMA: Primary | ICD-10-CM

## 2023-09-07 PROCEDURE — 63600175 PHARM REV CODE 636 W HCPCS: Performed by: EMERGENCY MEDICINE

## 2023-09-07 PROCEDURE — 96374 THER/PROPH/DIAG INJ IV PUSH: CPT

## 2023-09-07 PROCEDURE — 99281 EMR DPT VST MAYX REQ PHY/QHP: CPT

## 2023-09-07 PROCEDURE — 99284 EMERGENCY DEPT VISIT MOD MDM: CPT | Mod: 27,25

## 2023-09-07 RX ORDER — FUROSEMIDE 20 MG/1
40 TABLET ORAL
Status: DISCONTINUED | OUTPATIENT
Start: 2023-09-07 | End: 2023-09-07

## 2023-09-07 RX ORDER — FUROSEMIDE 20 MG/1
20 TABLET ORAL DAILY
Qty: 7 TABLET | Refills: 0 | Status: SHIPPED | OUTPATIENT
Start: 2023-09-07 | End: 2023-09-18

## 2023-09-07 RX ORDER — FUROSEMIDE 10 MG/ML
40 INJECTION INTRAMUSCULAR; INTRAVENOUS
Status: COMPLETED | OUTPATIENT
Start: 2023-09-07 | End: 2023-09-07

## 2023-09-07 RX ADMIN — FUROSEMIDE 40 MG: 10 INJECTION, SOLUTION INTRAMUSCULAR; INTRAVENOUS at 05:09

## 2023-09-07 NOTE — ED PROVIDER NOTES
Encounter Date: 9/7/2023       History     Chief Complaint   Patient presents with    MEDICAL SCREEN     PT SEEN EARLIER THIS AM BUT FORGOT TO ASK FOR RX FOR COMPRESSION STOCKING.  VOICES NO COMPLAINTS.      Delio Daniel Jr. Is a 68 y.o. male who presents to the ED requesting a prescription for compression stockings. States he was recently discharged and filled his lasix. He was told he needed to start wearing compression stockings, but does not have any and the pharmacy did not receive a prescription. He has no complaints at present.     The history is provided by the patient.     Review of patient's allergies indicates:  No Known Allergies  Past Medical History:   Diagnosis Date    Arthritis     Atrial fibrillation     BPH (benign prostatic hyperplasia)     Cardiac arrest     Coronary artery disease     Cyst, kidney, acquired     Diverticulosis     Hyperlipidemia     Hypertension     MI (myocardial infarction)     Obesity     Steatosis of liver      Past Surgical History:   Procedure Laterality Date    A-V CARDIAC PACEMAKER INSERTION Right     CARDIAC CATHETERIZATION      COLONOSCOPY W/ BIOPSIES      excision of colon       Family History   Problem Relation Age of Onset    Hypertension Mother     Hypertension Father     Hypertension Sister      Social History     Tobacco Use    Smoking status: Former    Smokeless tobacco: Never   Substance Use Topics    Alcohol use: Not Currently    Drug use: Not Currently     Review of Systems   Constitutional:  Negative for activity change, chills and fever.   HENT:  Negative for congestion and trouble swallowing.    Eyes:  Negative for photophobia and visual disturbance.   Respiratory:  Negative for chest tightness, shortness of breath and wheezing.    Cardiovascular:  Negative for chest pain and palpitations.   Gastrointestinal:  Negative for abdominal pain, constipation, diarrhea, nausea and vomiting.   Genitourinary:  Negative for dysuria, frequency, hematuria and urgency.    Musculoskeletal:  Negative for arthralgias, back pain and gait problem.   Skin:  Negative for color change and rash.   Neurological:  Negative for dizziness, syncope, weakness, light-headedness, numbness and headaches.   Psychiatric/Behavioral:  Negative for agitation and confusion. The patient is not nervous/anxious.        Physical Exam     Initial Vitals [09/07/23 1109]   BP Pulse Resp Temp SpO2   (!) 163/103 85 16 97.5 °F (36.4 °C) 98 %      MAP       --         Physical Exam    Nursing note and vitals reviewed.  Constitutional: He appears well-developed and well-nourished. No distress.   HENT:   Head: Normocephalic and atraumatic.   Mouth/Throat: No oropharyngeal exudate.   Eyes: EOM are normal. No scleral icterus.   Neck: Neck supple.   Normal range of motion.  Cardiovascular:  Normal rate and regular rhythm.           No murmur heard.  Pulmonary/Chest: No respiratory distress. He has no wheezes.   Abdominal: Abdomen is soft. He exhibits no distension. There is no abdominal tenderness.   Musculoskeletal:         General: No tenderness. Normal range of motion.      Cervical back: Normal range of motion and neck supple.     Neurological: He is alert and oriented to person, place, and time. No cranial nerve deficit.   Skin: Skin is warm and dry. Capillary refill takes less than 2 seconds. No erythema.   Psychiatric: He has a normal mood and affect. Thought content normal.         ED Course   Procedures  Labs Reviewed - No data to display       Imaging Results    None          Medications - No data to display  Medical Decision Making       APC / Resident Notes:   I was not physically present during the history, exam or disposition of this patient. I was available at all times for consultation. (Zmora)                   Medical Decision Making:   ED Management:  Pt provided with a printed prescription for compression stockings. Stable for discharge home. Encouraged close follow up with his PCP.       Clinical  Impression:   Final diagnoses:  [R60.0] Bilateral lower extremity edema (Primary)        ED Disposition Condition    Discharge Stable          ED Prescriptions    None       Follow-up Information       Follow up With Specialties Details Why Contact Info    [facility] - Emergency Dept Emergency Medicine  If symptoms worsen [address] [facility] [id number] [phone]    Magdi Rivera,  Internal Medicine In 3 days Hospital follow up [address] [facility] [address]  [phone]               Ning Stoner, PAVIKRAM  09/07/23 1540       Humberto Sewell MD  09/07/23 203

## 2023-09-07 NOTE — ED PROVIDER NOTES
ED PROVIDER NOTE  9/7/2023    CHIEF COMPLAINT:   Chief Complaint   Patient presents with    Leg Swelling     BLE edema, 3+.  States worsening and now painful.         HISTORY OF PRESENT ILLNESS:   Delio Daniel Jr. is a 68 y.o. male who presents with chief complaint Leg swelling. Onset was about a week ago with increasing lower extremity swelling, states it is improved when he gets lasix in the ED and elevating his legs, aggravated with putting his legs in dependent position. Associated symptoms of pain characterized as tightness. Denies chest pain or shortness of breath.    The history is provided by the patient.         REVIEW OF SYSTEMS: as noted in the HPI.  NURSING NOTES REVIEWED      PAST MEDICAL/SURGICAL HISTORY:   Past Medical History:   Diagnosis Date    Arthritis     Atrial fibrillation     BPH (benign prostatic hyperplasia)     Cardiac arrest     Coronary artery disease     Cyst, kidney, acquired     Diverticulosis     Hyperlipidemia     Hypertension     MI (myocardial infarction)     Obesity     Steatosis of liver       Past Surgical History:   Procedure Laterality Date    A-V CARDIAC PACEMAKER INSERTION Right     CARDIAC CATHETERIZATION      COLONOSCOPY W/ BIOPSIES      excision of colon         FAMILY HISTORY:   Family History   Problem Relation Age of Onset    Hypertension Mother     Hypertension Father     Hypertension Sister        SOCIAL HISTORY:   Social History     Tobacco Use    Smoking status: Former    Smokeless tobacco: Never   Substance Use Topics    Alcohol use: Not Currently    Drug use: Not Currently       ALLERGIES: Review of patient's allergies indicates:  No Known Allergies    PHYSICAL EXAM:  Initial Vitals [09/07/23 0449]   BP Pulse Resp Temp SpO2   (!) 142/88 85 18 97.5 °F (36.4 °C) 99 %      MAP       --         Physical Exam    Nursing note and vitals reviewed.  Constitutional: He appears well-developed and well-nourished. No distress.   HENT:   Head: Normocephalic and atraumatic.    Nose: Nose normal.   Mouth/Throat: Oropharynx is clear and moist and mucous membranes are normal.   Eyes: Conjunctivae and EOM are normal. Pupils are equal, round, and reactive to light.   Neck: Neck supple. No tracheal deviation present.   Cardiovascular:  Normal rate, regular rhythm, normal heart sounds, intact distal pulses and normal pulses.           Pulmonary/Chest: Effort normal and breath sounds normal. No respiratory distress.   Abdominal: Abdomen is soft. There is no abdominal tenderness. There is no rebound and no guarding.   Musculoskeletal:         General: Normal range of motion.      Cervical back: Neck supple.      Right lower leg: 3+ Pitting Edema present.      Left lower leg: 3+ Pitting Edema present.     Neurological: He is alert and oriented to person, place, and time. GCS eye subscore is 4. GCS verbal subscore is 5. GCS motor subscore is 6.   CN II-XII intact. Moves all extremities. No gross sensory or motor deficits.   Skin: Skin is warm, dry and intact.   Psychiatric: He has a normal mood and affect. His speech is normal and behavior is normal. Judgment and thought content normal. Cognition and memory are normal.         RESULTS:  Labs Reviewed - No data to display  Imaging Results    None         PROCEDURES:  Procedures    ECG:  EKG Readings: (Independently Interpreted)   Initial Reading: No STEMI. Rhythm: Atrial Fibrillation. Heart Rate: 86. Axis: Normal. Other Findings: Prolonged QT Interval.       ED COURSE AND MEDICAL DECISION MAKING:  Medications   furosemide injection 40 mg (40 mg Intravenous Given 9/7/23 0517)           Medical Decision Making  68-year-old male who presents with lower extremity swelling he states it has been persistent over the past week having some discomfort in his legs that he describes as tightness, denies chest pain or shortness of breath or cough or fevers.  This is his 3rd ED visit for the same.  He states that whenever he gets the Lasix in the emergency  department at seems to help and elevating his legs helps as well, states that he plans to get some compression socks but is not able to get him at this time due to financial constraints.  He was on spironolactone but is not on Lasix and he states the spironolactone does not help with the swelling at all.  We will try Lasix 20 mg daily for week and instructed him to follow up with his PCP to ensure that his labs are good especially with regards to his potassium and creatinine level.  His labs from his visit 2 days ago were reviewed which showed his potassium was 3.4 and creatinine of 1.2.  Patient acknowledges understanding.  Given strict ED return precautions. I have spoken with the patient and/or caregivers. I have explained the patient's condition, diagnoses and treatment plan based on the information available to me at this time. I have answered the patient's and/or caregiver's questions and addressed any concerns. The patient and/or caregivers have as good an understanding of the patient's diagnosis, condition and treatment plan as can be expected at this point. The vital signs have been stable. The patient's condition is stable and appropriate for discharge from the emergency department.     The patient will pursue further outpatient evaluation with the primary care physician or other designated or consulting physician as outlined in the discharge instructions. The patient and/or caregivers are agreeable to this plan of care and follow-up instructions have been explained in detail. The patient and/or caregivers have received these instructions in written format and have expressed an understanding of the discharge instructions. The patient and/or caregivers are aware that any significant change in condition or worsening of symptoms should prompt an immediate return to this or the closest emergency department or a call to 911.    Amount and/or Complexity of Data Reviewed  External Data Reviewed: labs, radiology  and notes.     Details: 08/15/2023 venous duplex ultrasound bilateral lower extremities negative for DVT.    Risk  Prescription drug management.  Diagnosis or treatment significantly limited by social determinants of health.        CLINICAL IMPRESSION:  1. Peripheral edema        DISPOSITION:   ED Disposition Condition    Discharge Stable            ED Prescriptions       Medication Sig Dispense Start Date End Date Auth. Provider    furosemide (LASIX) 20 MG tablet Take 1 tablet (20 mg total) by mouth once daily. for 7 days 7 tablet 9/7/2023 9/14/2023 Kelvin Salcido DO          Follow-up Information       Follow up With Specialties Details Why Contact Info    Magdi Rivera,  Internal Medicine Schedule an appointment as soon as possible for a visit   [address] [facility] [address]  [phone]      [facility] - Emergency Dept Emergency Medicine  If symptoms worsen [address] [facility] [id number] [phone]               Kelvin Salcido DO  09/07/23 0523

## 2023-09-14 ENCOUNTER — HOSPITAL ENCOUNTER (EMERGENCY)
Facility: HOSPITAL | Age: 68
Discharge: HOME | End: 2023-09-15
Attending: FAMILY MEDICINE
Payer: MEDICARE

## 2023-09-14 VITALS
BODY MASS INDEX: 38.89 KG/M2 | DIASTOLIC BLOOD PRESSURE: 100 MMHG | HEART RATE: 84 BPM | RESPIRATION RATE: 20 BRPM | TEMPERATURE: 99 F | OXYGEN SATURATION: 100 % | HEIGHT: 66 IN | SYSTOLIC BLOOD PRESSURE: 164 MMHG | WEIGHT: 242 LBS

## 2023-09-14 DIAGNOSIS — R60.0 BILATERAL LOWER EXTREMITY EDEMA: Primary | ICD-10-CM

## 2023-09-14 PROCEDURE — 99283 EMERGENCY DEPT VISIT LOW MDM: CPT

## 2023-09-14 PROCEDURE — 25000003 PHARM REV CODE 250: Performed by: PHYSICIAN ASSISTANT

## 2023-09-14 RX ORDER — ACETAMINOPHEN AND CODEINE PHOSPHATE 300; 30 MG/1; MG/1
1 TABLET ORAL
Status: COMPLETED | OUTPATIENT
Start: 2023-09-14 | End: 2023-09-14

## 2023-09-14 RX ADMIN — ACETAMINOPHEN AND CODEINE PHOSPHATE 1 TABLET: 300; 30 TABLET ORAL at 11:09

## 2023-09-15 NOTE — ED PROVIDER NOTES
Encounter Date: 9/14/2023       History     Chief Complaint   Patient presents with    Leg Swelling     Chronic BLE edema     68-year-old male with a history of arthritis, AFib, BPH, MI, hypertension, hyperlipidemia, CAD, presents to the ED with chronic bilateral lower leg edema and mild pain x weeks.  He was recently seen in the emergency department and prescribed Lasix which she states has reduced the swelling and he was also prescribed compression stockings which he could not afford to get from the pharmacy.    The history is provided by the patient. No  was used.     Review of patient's allergies indicates:  No Known Allergies  Past Medical History:   Diagnosis Date    Arthritis     Atrial fibrillation     BPH (benign prostatic hyperplasia)     Cardiac arrest     Coronary artery disease     Cyst, kidney, acquired     Diverticulosis     Hyperlipidemia     Hypertension     MI (myocardial infarction)     Obesity     Steatosis of liver      Past Surgical History:   Procedure Laterality Date    A-V CARDIAC PACEMAKER INSERTION Right     CARDIAC CATHETERIZATION      COLONOSCOPY W/ BIOPSIES      excision of colon       Family History   Problem Relation Age of Onset    Hypertension Mother     Hypertension Father     Hypertension Sister      Social History     Tobacco Use    Smoking status: Former    Smokeless tobacco: Never   Substance Use Topics    Alcohol use: Not Currently    Drug use: Not Currently     Review of Systems   Constitutional:  Negative for chills and fever.   Respiratory:  Negative for cough and shortness of breath.    Cardiovascular:  Positive for leg swelling (bi). Negative for chest pain and palpitations.   Gastrointestinal:  Negative for abdominal pain, diarrhea, nausea and vomiting.   Genitourinary:  Negative for dysuria and flank pain.   Musculoskeletal:  Negative for back pain and neck pain.   Skin:  Negative for color change, pallor, rash and wound.   Neurological:  Negative  for dizziness, weakness, light-headedness and headaches.   Hematological:  Negative for adenopathy.       Physical Exam     Initial Vitals [09/14/23 2204]   BP Pulse Resp Temp SpO2   (!) 164/100 84 20 98.6 °F (37 °C) 100 %      MAP       --         Physical Exam    Nursing note and vitals reviewed.  Constitutional: He appears well-developed and well-nourished.   HENT:   Nose: Nose normal.   Mouth/Throat: Oropharynx is clear and moist.   Eyes: Conjunctivae are normal.   Neck: Neck supple.   Normal range of motion.  Cardiovascular:  Normal rate, regular rhythm, normal heart sounds and intact distal pulses.           Pulmonary/Chest: Breath sounds normal. No respiratory distress. He has no wheezes.   Abdominal: Abdomen is soft. Bowel sounds are normal. There is no abdominal tenderness.   Musculoskeletal:         General: Normal range of motion.      Cervical back: Normal range of motion and neck supple.      Comments: Mild edema to bilateral lower legs and feet     Lymphadenopathy:     He has no cervical adenopathy.   Neurological: He is alert and oriented to person, place, and time. GCS score is 15. GCS eye subscore is 4. GCS verbal subscore is 5. GCS motor subscore is 6.   Skin: Skin is warm. Capillary refill takes less than 2 seconds.         ED Course   Procedures  Labs Reviewed - No data to display       Imaging Results    None          Medications   acetaminophen-codeine 300-30mg per tablet 1 tablet (1 tablet Oral Given 9/14/23 2350)     Medical Decision Making  68-year-old male with a history of arthritis, AFib, BPH, MI, hypertension, hyperlipidemia, CAD, presents to the ED with chronic bilateral lower leg edema and mild pain x weeks.  He was recently seen in the emergency department and prescribed Lasix which she states has reduced the swelling and he was also prescribed compression stockings which he could not afford to get from the pharmacy.  He states he has an appointment with his primary care provider  Monday for further evaluation.      Chronic bilateral lower extremity swelling with improvement.  No new symptoms.  No chest pain or shortness of breath.  Wrapped bilateral lower extremities with Ace wrap and gave Tylenol 3 for pain.  He will follow up with his PCP next week.  He will continue taking Lasix and discuss further management with his PCP    The patient is resting comfortably and in no acute distress.  He states that his symptoms have improved after treatment in Emergency Department. I personally discussed his treatment plan.  Gave strict ED precautions and specific conditions for return to the emergency department and importance of follow up with pcp.  Patient voices understanding and agrees to the plan discussed. All patients' questions have been answered at this time. He has remained hemodynamically stable throughout entire stay in ED and is stable for discharge home.     Risk  Prescription drug management.                               Clinical Impression:   Final diagnoses:  [R60.0] Bilateral lower extremity edema (Primary)        ED Disposition Condition    Discharge Stable          ED Prescriptions    None       Follow-up Information       Follow up With Specialties Details Why Contact Info    [facility] - Emergency Dept Emergency Medicine  As needed, If symptoms worsen [address], [place] [address] [phone]             Tracy Fletcher PA  09/16/23 0255

## 2023-09-15 NOTE — DISCHARGE INSTRUCTIONS
Continue taking Lasix as prescribed.  Use Ace wraps to help decrease swelling.  Follow up with your primary care provider next week as planned.  Return if symptoms worsen.

## 2023-09-18 ENCOUNTER — OFFICE VISIT (OUTPATIENT)
Dept: INTERNAL MEDICINE | Facility: CLINIC | Age: 68
End: 2023-09-18
Payer: MEDICARE

## 2023-09-18 VITALS
HEIGHT: 66 IN | HEART RATE: 63 BPM | RESPIRATION RATE: 20 BRPM | WEIGHT: 249.81 LBS | DIASTOLIC BLOOD PRESSURE: 87 MMHG | OXYGEN SATURATION: 100 % | BODY MASS INDEX: 40.15 KG/M2 | SYSTOLIC BLOOD PRESSURE: 135 MMHG | TEMPERATURE: 98 F

## 2023-09-18 DIAGNOSIS — R60.0 BILATERAL LOWER EXTREMITY EDEMA: ICD-10-CM

## 2023-09-18 DIAGNOSIS — I10 HYPERTENSION, UNSPECIFIED TYPE: Primary | ICD-10-CM

## 2023-09-18 DIAGNOSIS — Z12.5 PROSTATE CANCER SCREENING: ICD-10-CM

## 2023-09-18 PROCEDURE — 99213 OFFICE O/P EST LOW 20 MIN: CPT | Mod: PBBFAC | Performed by: INTERNAL MEDICINE

## 2023-09-18 RX ORDER — FUROSEMIDE 20 MG/1
20 TABLET ORAL 2 TIMES DAILY
Qty: 60 TABLET | Refills: 11 | Status: SHIPPED | OUTPATIENT
Start: 2023-09-18 | End: 2023-09-19

## 2023-09-18 RX ORDER — OMEPRAZOLE 20 MG/1
20 CAPSULE, DELAYED RELEASE ORAL DAILY
COMMUNITY
End: 2023-11-17 | Stop reason: SDUPTHER

## 2023-09-18 RX ORDER — FUROSEMIDE 20 MG/1
20 TABLET ORAL 2 TIMES DAILY
Qty: 60 TABLET | Refills: 11 | Status: SHIPPED | OUTPATIENT
Start: 2023-09-18 | End: 2023-09-18

## 2023-09-18 NOTE — PROGRESS NOTES
Chief Complaint  Follow-up (Pt here today for f/u visit. Recent admit for lower extremity swelling. B/L lower extremity edema + 3 present.  )     HPI  Delio Daniel Jr. is a 68 y.o. male who has a past medical history of Arthritis, Atrial fibrillation, BPH (benign prostatic hyperplasia), Cardiac arrest, Coronary artery disease, Cyst, kidney, acquired, Diverticulosis, Hyperlipidemia, Hypertension, MI (myocardial infarction), Obesity, and Steatosis of liver.  He presents to clinic today for follow-up of chronic medical conditions.     Since last visit had multiple visits to the ED and two admissions for lower extremity edema. Had Echocardiogram on 8/15 with normal EF (55-60%) however diastolic Function could not be assessed due to atrial fibrillation. Labs done earlier this week shows slight bump in Cr. Today has complaints of bilateral lower extremity swelling. States swelling was worse a couple days ago. No orthopnea or PND. Has dyspnea on exertion. No Chest pain    ROS  Review of Systems   Constitutional:  Negative for chills and fever.   Respiratory:  Negative for shortness of breath.    Cardiovascular:  Negative for chest pain and leg swelling.   Gastrointestinal:  Negative for nausea and vomiting.   Genitourinary:  Negative for dysuria.   Musculoskeletal:  Negative for myalgias.       PE  Vitals:    09/18/23 0816   BP: 135/87   Pulse: 63   Resp: 20   Temp: 98 °F (36.7 °C)            Physical Exam  Vitals and nursing note reviewed.   Constitutional:       General: He is not in acute distress.     Appearance: He is well-developed and well-groomed. He is obese. He is not ill-appearing, toxic-appearing or diaphoretic.   HENT:      Head: Normocephalic and atraumatic.   Eyes:      General: Lids are normal. Vision grossly intact.      Extraocular Movements: Extraocular movements intact.      Conjunctiva/sclera: Conjunctivae normal.      Pupils: Pupils are equal, round, and reactive to light.   Neck:      Trachea:  Trachea normal.   Cardiovascular:      Rate and Rhythm: Normal rate. Rhythm irregular.      Chest Wall: PMI is not displaced.      Pulses: Normal pulses.      Heart sounds: Normal heart sounds, S1 normal and S2 normal. No murmur heard.     No gallop.   Pulmonary:      Effort: Pulmonary effort is normal. No respiratory distress.      Breath sounds: Normal breath sounds. No decreased breath sounds, wheezing, rhonchi or rales.   Chest:      Chest wall: No tenderness.   Abdominal:      General: Bowel sounds are normal. There is no distension.      Palpations: Abdomen is soft. There is no mass.      Tenderness: There is no abdominal tenderness.   Musculoskeletal:         General: Swelling present. No tenderness. Normal range of motion.      Cervical back: Normal range of motion.      Right lower leg: Edema (1+) present.      Left lower leg: Edema (1+) present.   Skin:     General: Skin is warm and dry.      Coloration: Skin is not cyanotic, jaundiced or pale.   Neurological:      General: No focal deficit present.      Mental Status: He is alert and oriented to person, place, and time.      Gait: Gait is intact.          Assessment/Plan  Back Pain  - Stretch and exercise. Ibuprofen as needed    Hypertension   -Blood pressure 135/87  - C/w Current Regimen. STOP Nifedipine    Bilateral Lower extremity Swelling  - Has been on a whole lot of NSAIDS. Stop this.   - Lasix 20 daily  - CMP today    CAD with history of NSTEMI in 2003  Second-degree AV block s/p pacemaker now ICD   -Continue atorvastatin 40 mg daily and aspirin 81 mg daily    A fib   -Currently asymptomatic, rate controlled   -metoprolol, diltiazem, and Xarelto    Abdominal pain in the setting of history of Small bowel carcinoid tumor s/p resection (2018)  -Currently followed by [facility] oncology and [facility] oncology  -CT chest/abdomen/pelvis: No mets. No acute changes    DOES NOT WANT VACCINES  RTC in 3 months.      Future Appointments   Date Time Provider Department  [place]   9/18/2023 10:10 AM Magdi Rivera, DO [facility] IM RES [place]   9/19/2023  6:45 AM NURSE, [facility] HEMATOLOGY ONCOLOGY [facility] [place]   10/5/2023 11:30 AM [facility] ECHO 02 [facility] ECHO [place]   11/7/2023  8:15 AM PACEMAKER, [facility] CARDIOLOGY [facility] CARD [place]   11/9/2023  9:00 AM Cyndie Villegas MD [facility] CARD [place]   12/11/2023  7:30 AM Magdi Rivera, DO [facility] IM RES [place]   8/12/2024  9:15 AM NURSE, [facility] HEMATOLOGY ONCOLOGY [facility] [place]   8/12/2024 10:20 AM [facility] Reed [facility]     Magdi Rivera, DO  Internal Medicine - PGY-3

## 2023-09-19 ENCOUNTER — APPOINTMENT (OUTPATIENT)
Dept: HEMATOLOGY/ONCOLOGY | Facility: CLINIC | Age: 68
End: 2023-09-19
Payer: MEDICARE

## 2023-09-19 DIAGNOSIS — C7A.8 NEUROENDOCRINE CARCINOMA OF SMALL BOWEL: ICD-10-CM

## 2023-09-19 DIAGNOSIS — I49.01 CARDIAC ARREST WITH VENTRICULAR FIBRILLATION: ICD-10-CM

## 2023-09-19 DIAGNOSIS — Z79.01 CURRENT USE OF LONG TERM ANTICOAGULATION: ICD-10-CM

## 2023-09-19 DIAGNOSIS — I48.91 ATRIAL FIBRILLATION WITH RAPID VENTRICULAR RESPONSE: ICD-10-CM

## 2023-09-19 DIAGNOSIS — I46.9 CARDIAC ARREST WITH VENTRICULAR FIBRILLATION: ICD-10-CM

## 2023-09-19 DIAGNOSIS — Z95.0 CARDIAC PACEMAKER IN SITU: ICD-10-CM

## 2023-09-19 DIAGNOSIS — Z86.718 HISTORY OF DEEP VEIN THROMBOSIS (DVT) OF LOWER EXTREMITY: ICD-10-CM

## 2023-09-19 DIAGNOSIS — I10 HYPERTENSION, UNSPECIFIED TYPE: ICD-10-CM

## 2023-09-19 LAB
ALBUMIN SERPL-MCNC: 3.8 G/DL (ref 3.4–4.8)
ALBUMIN/GLOB SERPL: 1.1 RATIO (ref 1.1–2)
ALP SERPL-CCNC: 56 UNIT/L (ref 40–150)
ALT SERPL-CCNC: 27 UNIT/L (ref 0–55)
AST SERPL-CCNC: 25 UNIT/L (ref 5–34)
BASOPHILS # BLD AUTO: 0.05 X10(3)/MCL
BASOPHILS NFR BLD AUTO: 0.8 %
BILIRUB SERPL-MCNC: 1.3 MG/DL
BUN SERPL-MCNC: 13 MG/DL (ref 8.4–25.7)
CALCIUM SERPL-MCNC: 9.5 MG/DL (ref 8.8–10)
CHLORIDE SERPL-SCNC: 107 MMOL/L (ref 98–107)
CO2 SERPL-SCNC: 27 MMOL/L (ref 23–31)
CREAT SERPL-MCNC: 1.2 MG/DL (ref 0.73–1.18)
EOSINOPHIL # BLD AUTO: 0.09 X10(3)/MCL (ref 0–0.9)
EOSINOPHIL NFR BLD AUTO: 1.4 %
ERYTHROCYTE [DISTWIDTH] IN BLOOD BY AUTOMATED COUNT: 14.4 % (ref 11.5–17)
GFR SERPLBLD CREATININE-BSD FMLA CKD-EPI: >60 MLS/MIN/1.73/M2
GLOBULIN SER-MCNC: 3.5 GM/DL (ref 2.4–3.5)
GLUCOSE SERPL-MCNC: 102 MG/DL (ref 82–115)
HCT VFR BLD AUTO: 40.6 % (ref 42–52)
HGB BLD-MCNC: 13.3 G/DL (ref 14–18)
IMM GRANULOCYTES # BLD AUTO: 0.03 X10(3)/MCL (ref 0–0.04)
IMM GRANULOCYTES NFR BLD AUTO: 0.5 %
LYMPHOCYTES # BLD AUTO: 1.8 X10(3)/MCL (ref 0.6–4.6)
LYMPHOCYTES NFR BLD AUTO: 27.5 %
MAGNESIUM SERPL-MCNC: 1.9 MG/DL (ref 1.6–2.6)
MCH RBC QN AUTO: 30.1 PG (ref 27–31)
MCHC RBC AUTO-ENTMCNC: 32.8 G/DL (ref 33–36)
MCV RBC AUTO: 91.9 FL (ref 80–94)
MONOCYTES # BLD AUTO: 0.6 X10(3)/MCL (ref 0.1–1.3)
MONOCYTES NFR BLD AUTO: 9.2 %
NEUTROPHILS # BLD AUTO: 3.98 X10(3)/MCL (ref 2.1–9.2)
NEUTROPHILS NFR BLD AUTO: 60.6 %
NRBC BLD AUTO-RTO: 0 %
PLATELET # BLD AUTO: 209 X10(3)/MCL (ref 130–400)
PMV BLD AUTO: 9.8 FL (ref 7.4–10.4)
POTASSIUM SERPL-SCNC: 3.6 MMOL/L (ref 3.5–5.1)
PROT SERPL-MCNC: 7.3 GM/DL (ref 5.8–7.6)
RBC # BLD AUTO: 4.42 X10(6)/MCL (ref 4.7–6.1)
SODIUM SERPL-SCNC: 140 MMOL/L (ref 136–145)
WBC # SPEC AUTO: 6.55 X10(3)/MCL (ref 4.5–11.5)

## 2023-09-19 PROCEDURE — 85025 COMPLETE CBC W/AUTO DIFF WBC: CPT

## 2023-09-19 PROCEDURE — 36415 COLL VENOUS BLD VENIPUNCTURE: CPT

## 2023-09-19 PROCEDURE — 80053 COMPREHEN METABOLIC PANEL: CPT

## 2023-09-19 PROCEDURE — 83735 ASSAY OF MAGNESIUM: CPT

## 2023-09-19 RX ORDER — FUROSEMIDE 20 MG/1
20 TABLET ORAL DAILY
Qty: 30 TABLET | Refills: 3 | Status: SHIPPED | OUTPATIENT
Start: 2023-09-19 | End: 2023-09-30 | Stop reason: ALTCHOICE

## 2023-09-21 ENCOUNTER — HOSPITAL ENCOUNTER (EMERGENCY)
Facility: HOSPITAL | Age: 68
Discharge: HOME | End: 2023-09-21
Attending: STUDENT IN AN ORGANIZED HEALTH CARE EDUCATION/TRAINING PROGRAM
Payer: MEDICARE

## 2023-09-21 VITALS
RESPIRATION RATE: 19 BRPM | SYSTOLIC BLOOD PRESSURE: 162 MMHG | DIASTOLIC BLOOD PRESSURE: 102 MMHG | HEART RATE: 99 BPM | BODY MASS INDEX: 40.32 KG/M2 | OXYGEN SATURATION: 97 % | TEMPERATURE: 98 F | HEIGHT: 66 IN

## 2023-09-21 DIAGNOSIS — R60.0 BILATERAL LOWER EXTREMITY EDEMA: Primary | ICD-10-CM

## 2023-09-21 PROCEDURE — 99283 EMERGENCY DEPT VISIT LOW MDM: CPT

## 2023-09-21 NOTE — ED PROVIDER NOTES
"Encounter Date: 9/21/2023       History     Chief Complaint   Patient presents with    Leg Swelling     States BLE edema.  "Need Lasix".       Patient is a 68-year-old male with past medical history MI, hypertension, hyperlipidemia presents to the ED with bilateral lower extremity edema.  Patient was recently seen in ED 2 weeks ago with similar complaint.    He really recently saw his PCP 2 days ago who prescribed him Lasix 20 mg.  He got the script filled but states that he forgot to take his medication.  He has no other complaints other than the lower extremity edema.  He brought his Lasix with him in ED.    The history is provided by the patient. No  was used.     Review of patient's allergies indicates:  No Known Allergies  Past Medical History:   Diagnosis Date    Arthritis     Atrial fibrillation     BPH (benign prostatic hyperplasia)     Cardiac arrest     Coronary artery disease     Cyst, kidney, acquired     Diverticulosis     Hyperlipidemia     Hypertension     MI (myocardial infarction)     Obesity     Steatosis of liver      Past Surgical History:   Procedure Laterality Date    A-V CARDIAC PACEMAKER INSERTION Right     CARDIAC CATHETERIZATION      COLONOSCOPY W/ BIOPSIES      excision of colon       Family History   Problem Relation Age of Onset    Hypertension Mother     Hypertension Father     Hypertension Sister      Social History     Tobacco Use    Smoking status: Former    Smokeless tobacco: Never   Substance Use Topics    Alcohol use: Not Currently    Drug use: Not Currently     Review of Systems   Constitutional:  Negative for chills, fatigue and fever.   HENT:  Negative for congestion, postnasal drip and sore throat.    Respiratory:  Negative for cough, shortness of breath and wheezing.    Cardiovascular:  Positive for leg swelling. Negative for chest pain and palpitations.   Gastrointestinal:  Negative for abdominal distention, abdominal pain, diarrhea, nausea and vomiting. "   Genitourinary:  Negative for frequency and urgency.   Musculoskeletal:  Negative for arthralgias and myalgias.   Skin:  Negative for pallor, rash and wound.   Neurological:  Negative for dizziness, weakness and headaches.   Psychiatric/Behavioral:  Negative for agitation. The patient is not nervous/anxious.        Physical Exam     Initial Vitals [09/21/23 0110]   BP Pulse Resp Temp SpO2   (!) 162/102 99 19 98.4 °F (36.9 °C) 97 %      MAP       --         Physical Exam    Constitutional: He appears well-developed and well-nourished. No distress.   HENT:   Head: Normocephalic and atraumatic.   Mouth/Throat: Oropharynx is clear and moist.   Eyes: Conjunctivae and EOM are normal. Pupils are equal, round, and reactive to light.   Neck: Neck supple. No JVD present.   Normal range of motion.  Cardiovascular:  Normal rate, regular rhythm, normal heart sounds and intact distal pulses.     Exam reveals no friction rub.       No murmur heard.  Pulmonary/Chest: Breath sounds normal. No respiratory distress. He has no wheezes. He has no rhonchi. He has no rales.   Abdominal: Abdomen is soft. Bowel sounds are normal. He exhibits no distension and no mass. There is no rebound.   Musculoskeletal:         General: Edema (1+ lower extremity pitting edema) present. Normal range of motion.      Cervical back: Normal range of motion and neck supple.           ED Course   Procedures  Labs Reviewed - No data to display       Imaging Results    None          Medications - No data to display  Medical Decision Making  Patient is a 68-year-old male presented to ED with bilateral lower extremity pitting edema.  Patient has come to the ED several times with similar complaints.  States that he has not been taking his Lasix as prescribed.  Instructed patient is to take his home Lasix as prescribed. ED precautions provided.                                Clinical Impression:   Final diagnoses:  [R60.0] Bilateral lower extremity edema  (Primary)        ED Disposition Condition    Discharge Stable          ED Prescriptions    None       Follow-up Information       Follow up With Specialties Details Why Contact Info    Magdi Rivera, DO Internal Medicine Go to  As needed [address] [facility] [address]  [phone]      [facility] Emergency Medicine Go to  If symptoms worsen [address] [facility] [id number] [phone]             Mark Mckee MD  Resident  09/21/23 0321

## 2023-09-21 NOTE — DISCHARGE INSTRUCTIONS
Please start taking your medications that has been prescribed by your doctor.  Follow up in clinic as necessary, return with any new or worsening symptoms.

## 2023-09-22 ENCOUNTER — HOSPITAL ENCOUNTER (EMERGENCY)
Facility: HOSPITAL | Age: 68
Discharge: HOME | End: 2023-09-22
Attending: FAMILY MEDICINE
Payer: MEDICARE

## 2023-09-22 VITALS
SYSTOLIC BLOOD PRESSURE: 132 MMHG | HEART RATE: 72 BPM | HEIGHT: 66 IN | DIASTOLIC BLOOD PRESSURE: 66 MMHG | OXYGEN SATURATION: 99 % | RESPIRATION RATE: 20 BRPM | TEMPERATURE: 98 F | BODY MASS INDEX: 40.04 KG/M2 | WEIGHT: 249.13 LBS

## 2023-09-22 DIAGNOSIS — R60.0 BILATERAL LOWER EXTREMITY EDEMA: Primary | ICD-10-CM

## 2023-09-22 PROCEDURE — 25000003 PHARM REV CODE 250

## 2023-09-22 PROCEDURE — 99283 EMERGENCY DEPT VISIT LOW MDM: CPT

## 2023-09-22 RX ORDER — POTASSIUM CHLORIDE 20 MEQ/1
40 TABLET, EXTENDED RELEASE ORAL
Status: COMPLETED | OUTPATIENT
Start: 2023-09-22 | End: 2023-09-22

## 2023-09-22 RX ORDER — FUROSEMIDE 20 MG/1
40 TABLET ORAL
Status: COMPLETED | OUTPATIENT
Start: 2023-09-22 | End: 2023-09-22

## 2023-09-22 RX ADMIN — POTASSIUM CHLORIDE 40 MEQ: 1500 TABLET, EXTENDED RELEASE ORAL at 05:09

## 2023-09-22 RX ADMIN — FUROSEMIDE 40 MG: 20 TABLET ORAL at 05:09

## 2023-09-22 NOTE — ED PROVIDER NOTES
Encounter Date: 9/22/2023       History     Chief Complaint   Patient presents with    Leg Pain     Patient is a 68-year-old male with past medical history MI, hypertension, hyperlipidemia presents to the ED with bilateral lower extremity edema.  Patient presented 1 day prior with the same complaint.  He recently went to his PCP on 09/18/2023 where he had his Lasix refilled.  He states that he filled this medication but forgot to take it.  When he came to the ED yesterday he was given Lasix.  The lower extremity edema is improved from yesterday.  States that the Lasix significantly helps with his lower extremity edema and leg pain.  Has no other complaints at this time.    The history is provided by the patient. No  was used.     Review of patient's allergies indicates:  No Known Allergies  Past Medical History:   Diagnosis Date    Arthritis     Atrial fibrillation     BPH (benign prostatic hyperplasia)     Cardiac arrest     Coronary artery disease     Cyst, kidney, acquired     Diverticulosis     Hyperlipidemia     Hypertension     MI (myocardial infarction)     Obesity     Steatosis of liver      Past Surgical History:   Procedure Laterality Date    A-V CARDIAC PACEMAKER INSERTION Right     CARDIAC CATHETERIZATION      COLONOSCOPY W/ BIOPSIES      excision of colon       Family History   Problem Relation Age of Onset    Hypertension Mother     Hypertension Father     Hypertension Sister      Social History     Tobacco Use    Smoking status: Former    Smokeless tobacco: Never   Substance Use Topics    Alcohol use: Not Currently    Drug use: Not Currently     Review of Systems   Constitutional:  Negative for chills and fever.   HENT:  Negative for postnasal drip, sinus pain and sore throat.    Respiratory:  Negative for cough, chest tightness, shortness of breath and wheezing.    Cardiovascular:  Positive for leg swelling (bilateral lower extremities). Negative for chest pain and palpitations.    Gastrointestinal:  Negative for abdominal distention, abdominal pain and nausea.   Genitourinary:  Negative for frequency, hematuria and urgency.   Musculoskeletal:  Negative for back pain and myalgias.   Skin:  Negative for pallor, rash and wound.   Neurological:  Negative for dizziness, weakness and headaches.   Psychiatric/Behavioral:  Negative for agitation and confusion. The patient is not nervous/anxious.    All other systems reviewed and are negative.      Physical Exam     Initial Vitals [09/22/23 0449]   BP Pulse Resp Temp SpO2   139/72 72 20 97.6 °F (36.4 °C) 100 %      MAP       --         Physical Exam    Nursing note and vitals reviewed.  Constitutional: He appears well-developed and well-nourished. No distress.   HENT:   Head: Normocephalic and atraumatic.   Mouth/Throat: Oropharynx is clear and moist.   Eyes: Conjunctivae and EOM are normal. Pupils are equal, round, and reactive to light.   Neck: Neck supple. No JVD present.   Normal range of motion.  Cardiovascular:  Normal rate, regular rhythm, normal heart sounds and intact distal pulses.           No murmur heard.  Pulmonary/Chest: Breath sounds normal. No respiratory distress. He has no wheezes. He has no rhonchi.   Abdominal: Abdomen is soft. Bowel sounds are normal. He exhibits no distension and no mass. There is no abdominal tenderness.   Musculoskeletal:         General: Edema (1+ pitting bilateral lower extremity edema) present. Normal range of motion.      Cervical back: Normal range of motion and neck supple.     Neurological: He is alert and oriented to person, place, and time. No cranial nerve deficit.   Skin: Skin is warm and dry. No rash noted. No erythema.   Psychiatric: He has a normal mood and affect. His behavior is normal. Judgment normal.         ED Course   Procedures  Labs Reviewed - No data to display       Imaging Results    None          Medications   furosemide tablet 40 mg (40 mg Oral Given 9/22/23 0519)   potassium  chloride SA CR tablet 40 mEq (40 mEq Oral Given 9/22/23 0519)     Medical Decision Making    Patient is a 68-year-old male presents to ED with bilateral lower extremity edema.  Patient was seen in the ED 1 day prior with similar complaints and was given Lasix with improvement of the edema seen today.  Patient states that he forgot to take his Lasix again.  No other complaints at this time.  Given 40 mg of Lasix and 40 mEq of KCL in ED. Patient is stable.  ED precautions provided.    Advised patient to take his Lasix as prescribed.    Risk  Prescription drug management.              Attending Attestation:   Physician Attestation Statement for Resident:  As the supervising MD   Physician Attestation Statement: I have personally seen and examined this patient.   I agree with the above history.  -:   As the supervising MD I agree with the above PE.     As the supervising MD I agree with the above treatment, course, plan, and disposition.     I have reviewed the following: old records at this facility.                                Clinical Impression:   Final diagnoses:  [R60.0] Bilateral lower extremity edema (Primary)        ED Disposition Condition    Discharge Stable          ED Prescriptions    None       Follow-up Information       Follow up With Specialties Details Why Contact Info    Magdi Rivera, DO Internal Medicine  As needed [address] [facility] [address]  [phone]      [facility] Emergency Medicine  If symptoms worsen [address] [facility] [id number] [phone]             Mark Mckee MD  Resident  09/22/23 0525       Eduardo Hernandez MD  09/24/23 0543

## 2023-09-23 ENCOUNTER — HOSPITAL ENCOUNTER (EMERGENCY)
Facility: HOSPITAL | Age: 68
Discharge: HOME | End: 2023-09-23
Attending: FAMILY MEDICINE
Payer: MEDICARE

## 2023-09-23 VITALS
DIASTOLIC BLOOD PRESSURE: 89 MMHG | RESPIRATION RATE: 18 BRPM | SYSTOLIC BLOOD PRESSURE: 128 MMHG | HEART RATE: 76 BPM | OXYGEN SATURATION: 99 % | TEMPERATURE: 98 F

## 2023-09-23 DIAGNOSIS — M79.605 LEG PAIN, BILATERAL: Primary | ICD-10-CM

## 2023-09-23 DIAGNOSIS — M79.604 LEG PAIN, BILATERAL: Primary | ICD-10-CM

## 2023-09-23 PROCEDURE — 99282 EMERGENCY DEPT VISIT SF MDM: CPT

## 2023-09-23 NOTE — ED PROVIDER NOTES
Encounter Date: 9/23/2023       History     Chief Complaint   Patient presents with    Leg Swelling     Bilateral low leg swelling for the past several weeks. Reports he takes lasix at home, but not helping. Steady gait with walker from home.      Patient is a 68-year-old male with past medical history hypertension, hyperlipidemia presents to the ED for bilateral lower extremity pain and swelling.  Patient has had multiple presentations to the ED with similar complaints, most recent being the previous 2 days.  Presentations he states that he forgot to take his Lasix.  However, today he says that he took his Lasix yesterday but his legs are still a little swollen and painful.  Patient has no other complaints at this time.  It appears as though patient was unsure of how many doses of Lasix he has.  After reviewing his recent prescriptions I informed that he has 11 refills remaining of his Lasix.  Patient voiced understanding.    The history is provided by the patient. No  was used.     Review of patient's allergies indicates:  No Known Allergies  Past Medical History:   Diagnosis Date    Arthritis     Atrial fibrillation     BPH (benign prostatic hyperplasia)     Cardiac arrest     Coronary artery disease     Cyst, kidney, acquired     Diverticulosis     Hyperlipidemia     Hypertension     MI (myocardial infarction)     Obesity     Steatosis of liver      Past Surgical History:   Procedure Laterality Date    A-V CARDIAC PACEMAKER INSERTION Right     CARDIAC CATHETERIZATION      COLONOSCOPY W/ BIOPSIES      excision of colon       Family History   Problem Relation Age of Onset    Hypertension Mother     Hypertension Father     Hypertension Sister      Social History     Tobacco Use    Smoking status: Former    Smokeless tobacco: Never   Substance Use Topics    Alcohol use: Not Currently    Drug use: Not Currently     Review of Systems   Constitutional:  Negative for chills and fever.   HENT:   Negative for congestion, sinus pressure, sinus pain and sore throat.    Respiratory:  Negative for choking, chest tightness, shortness of breath and wheezing.    Cardiovascular:  Positive for leg swelling. Negative for chest pain and palpitations.   Gastrointestinal:  Negative for abdominal distention, abdominal pain and nausea.   Genitourinary:  Negative for frequency, hematuria and urgency.   Musculoskeletal:  Negative for arthralgias and myalgias.        Bilateral lower extremity pain   Skin:  Negative for pallor, rash and wound.   Neurological:  Negative for dizziness, syncope and headaches.   Psychiatric/Behavioral:  Negative for agitation and confusion. The patient is not nervous/anxious.    All other systems reviewed and are negative.      Physical Exam     Initial Vitals [09/23/23 0528]   BP Pulse Resp Temp SpO2   (!) 132/97 83 20 97.7 °F (36.5 °C) 98 %      MAP       --         Physical Exam    Nursing note and vitals reviewed.  Constitutional: He appears well-developed and well-nourished. No distress.   HENT:   Head: Normocephalic and atraumatic.   Mouth/Throat: Oropharynx is clear and moist.   Eyes: Conjunctivae and EOM are normal. Pupils are equal, round, and reactive to light.   Neck: Neck supple. No JVD present.   Normal range of motion.  Cardiovascular:  Normal rate, regular rhythm, normal heart sounds and intact distal pulses.           No murmur heard.  Pulmonary/Chest: Breath sounds normal. No respiratory distress. He has no wheezes. He has no rales.   Abdominal: Abdomen is soft. Bowel sounds are normal. He exhibits no distension and no mass. There is no rebound.   Musculoskeletal:         General: Edema (1+ pitting edema in bilateral lower extremities) present. Normal range of motion.      Cervical back: Normal range of motion and neck supple.     Neurological: He is alert and oriented to person, place, and time. No cranial nerve deficit.   Skin: Skin is warm and dry. No rash noted. No erythema.    Psychiatric: He has a normal mood and affect. His behavior is normal. Judgment normal.         ED Course   Procedures  Labs Reviewed - No data to display       Imaging Results    None          Medications - No data to display  Medical Decision Making  68-year-old male presents to the ED with bilateral lower extremity edema and pain.  Multiple admissions in the past with similar presentation.  Appears as though patient did not understand that he had enough Lasix.  Explained to patient that he has a full supply of Lasix with 11 refills.  Patient voiced understanding.  ED precautions provided.              Attending Attestation:   Physician Attestation Statement for Resident:  As the supervising MD   Physician Attestation Statement: I have personally seen and examined this patient.   I agree with the above history.  -:   As the supervising MD I agree with the above PE.     As the supervising MD I agree with the above treatment, course, plan, and disposition.     I have reviewed the following: old records at this facility.                                Clinical Impression:   Final diagnoses:  [M79.604, M79.605] Leg pain, bilateral (Primary)        ED Disposition Condition    Discharge Stable          ED Prescriptions    None       Follow-up Information       Follow up With Specialties Details Why Contact Info    Magdi Rivera, DO Internal Medicine  As needed [address] [facility] [address]  [phone]      [facility] Emergency Medicine  If symptoms worsen [address] [facility] [phone]             Mark Mckee MD  Resident  09/23/23 0627       Eduardo Hernandez MD  09/27/23 1508

## 2023-09-27 ENCOUNTER — HOSPITAL ENCOUNTER (EMERGENCY)
Facility: HOSPITAL | Age: 68
Discharge: HOME | End: 2023-09-27
Attending: EMERGENCY MEDICINE
Payer: MEDICARE

## 2023-09-27 VITALS
RESPIRATION RATE: 18 BRPM | DIASTOLIC BLOOD PRESSURE: 85 MMHG | TEMPERATURE: 98 F | HEART RATE: 75 BPM | OXYGEN SATURATION: 99 % | SYSTOLIC BLOOD PRESSURE: 123 MMHG

## 2023-09-27 DIAGNOSIS — I87.2 STASIS DERMATITIS OF BOTH LEGS: ICD-10-CM

## 2023-09-27 DIAGNOSIS — R60.0 PERIPHERAL EDEMA: Primary | ICD-10-CM

## 2023-09-27 PROCEDURE — 25000003 PHARM REV CODE 250: Performed by: EMERGENCY MEDICINE

## 2023-09-27 PROCEDURE — 99283 EMERGENCY DEPT VISIT LOW MDM: CPT

## 2023-09-27 RX ORDER — FUROSEMIDE 20 MG/1
40 TABLET ORAL
Status: COMPLETED | OUTPATIENT
Start: 2023-09-27 | End: 2023-09-27

## 2023-09-27 RX ADMIN — FUROSEMIDE 40 MG: 20 TABLET ORAL at 04:09

## 2023-09-27 NOTE — ED PROVIDER NOTES
"ED PROVIDER NOTE  9/27/2023    CHIEF COMPLAINT:   Chief Complaint   Patient presents with    Leg Pain       HISTORY OF PRESENT ILLNESS:   Delio Daniel Jr. is a 68 y.o. male who presents with chief complaint Leg swelling. Reports increased increased swelling in his legs over the past day. States that he has been taking his lasix. He reports that a nurse had given him some ace wrap for his legs but feels like it just make it worse.  He states that his legs are aching due to the swelling and states it is "annoying him" so that is why he came to the ED today.  He states that he has been taking his Lasix and has been urinating without difficulty.  Denies any chest pain or shortness of breath and no other complaints voiced.    The history is provided by the patient.         REVIEW OF SYSTEMS: as noted in the HPI.  NURSING NOTES REVIEWED      PAST MEDICAL/SURGICAL HISTORY:   Past Medical History:   Diagnosis Date    Arthritis     Atrial fibrillation     BPH (benign prostatic hyperplasia)     Cardiac arrest     Coronary artery disease     Cyst, kidney, acquired     Diverticulosis     Hyperlipidemia     Hypertension     MI (myocardial infarction)     Obesity     Steatosis of liver       Past Surgical History:   Procedure Laterality Date    A-V CARDIAC PACEMAKER INSERTION Right     CARDIAC CATHETERIZATION      COLONOSCOPY W/ BIOPSIES      excision of colon         FAMILY HISTORY:   Family History   Problem Relation Age of Onset    Hypertension Mother     Hypertension Father     Hypertension Sister        SOCIAL HISTORY:   Social History     Tobacco Use    Smoking status: Former    Smokeless tobacco: Never   Substance Use Topics    Alcohol use: Not Currently    Drug use: Not Currently       ALLERGIES: Review of patient's allergies indicates:  No Known Allergies    PHYSICAL EXAM:  Initial Vitals   BP Pulse Resp Temp SpO2   09/27/23 0428 09/27/23 0428 09/27/23 0428 09/27/23 0431 09/27/23 0428   125/87 75 20 97.9 °F (36.6 °C) " 100 %      MAP       --                Physical Exam    Nursing note and vitals reviewed.  Constitutional: Vital signs are normal. He appears well-developed and well-nourished. No distress.   HENT:   Head: Normocephalic and atraumatic.   Nose: Nose normal.   Mouth/Throat: Oropharynx is clear and moist and mucous membranes are normal.   Eyes: Conjunctivae and EOM are normal. Pupils are equal, round, and reactive to light.   Neck: Neck supple. No tracheal deviation present.   Cardiovascular:  Normal rate, regular rhythm, normal heart sounds, intact distal pulses and normal pulses.           Pulmonary/Chest: Effort normal and breath sounds normal. No respiratory distress.   Abdominal: Abdomen is soft. There is no abdominal tenderness. There is no rebound and no guarding.   Musculoskeletal:         General: Normal range of motion.      Cervical back: Neck supple.      Right lower le+ Pitting Edema present.      Left lower le+ Pitting Edema present.     Neurological: He is alert and oriented to person, place, and time. GCS eye subscore is 4. GCS verbal subscore is 5. GCS motor subscore is 6.   CN II-XII intact. Moves all extremities. No gross sensory or motor deficits.   Skin: Skin is warm, dry and intact.   Some mild erythema to the lower legs associated with 2+ pitting edema, no induration or tenderness to palpation.   Psychiatric: He has a normal mood and affect. His speech is normal and behavior is normal. Judgment and thought content normal. Cognition and memory are normal.         RESULTS:  Labs Reviewed - No data to display  Imaging Results    None         PROCEDURES:  Procedures    ECG:       ED COURSE AND MEDICAL DECISION MAKING:  Medications   furosemide tablet 40 mg (40 mg Oral Given 23 0436)           Medical Decision Making  68-year-old male presents with lower extremity swelling and discomfort associated with the swelling.  It looks like he was developing some mild stasis dermatitis due to his  swelling.  He does not have any induration or calf tenderness.  He is well-known to our department where he presents with same complaints of his lower extremity edema.  His previous labs and visits were reviewed.  He had some Ace bandages wrapped around the which I cautioned him against using as they can cause more constricted and provoke swelling as they do not provide graded compression. He states that he has some compression socks coming and they will be available on Monday.  Vital signs are normal.  I do not feel that any labs are workup is indicated otherwise at this time.  We will give an extra dose of Lasix p.o. and instructed to follow up with his PCP and take medications as directed and elevate his legs whenever he has the opportunity to do so.  Given strict ED return precautions. I have spoken with the patient and/or caregivers. I have explained the patient's condition, diagnoses and treatment plan based on the information available to me at this time. I have answered the patient's and/or caregiver's questions and addressed any concerns. The patient and/or caregivers have as good an understanding of the patient's diagnosis, condition and treatment plan as can be expected at this point. The vital signs have been stable. The patient's condition is stable and appropriate for discharge from the emergency department.     The patient will pursue further outpatient evaluation with the primary care physician or other designated or consulting physician as outlined in the discharge instructions. The patient and/or caregivers are agreeable to this plan of care and follow-up instructions have been explained in detail. The patient and/or caregivers have received these instructions in written format and have expressed an understanding of the discharge instructions. The patient and/or caregivers are aware that any significant change in condition or worsening of symptoms should prompt an immediate return to this or the  closest emergency department or a call to 911.    Amount and/or Complexity of Data Reviewed  External Data Reviewed: labs, radiology and notes.    Risk  Prescription drug management.        CLINICAL IMPRESSION:  1. Peripheral edema    2. Stasis dermatitis of both legs        DISPOSITION:   ED Disposition Condition    Discharge Stable            ED Prescriptions    None       Follow-up Information       Follow up With Specialties Details Why Contact Info    Magdi Rivera DO Internal Medicine Schedule an appointment as soon as possible for a visit   [address] [facility] [address]  [phone]      [facility] Emergency Medicine  If symptoms worsen [address] [facility] [id number] [phone]               Kelvin Salcido DO  09/27/23 0452

## 2023-09-28 ENCOUNTER — HOSPITAL ENCOUNTER (EMERGENCY)
Facility: HOSPITAL | Age: 68
Discharge: HOME | End: 2023-09-28
Attending: EMERGENCY MEDICINE
Payer: MEDICARE

## 2023-09-28 VITALS
OXYGEN SATURATION: 98 % | TEMPERATURE: 98 F | SYSTOLIC BLOOD PRESSURE: 144 MMHG | HEART RATE: 98 BPM | BODY MASS INDEX: 38.57 KG/M2 | HEIGHT: 66 IN | RESPIRATION RATE: 17 BRPM | DIASTOLIC BLOOD PRESSURE: 99 MMHG | WEIGHT: 240 LBS

## 2023-09-28 DIAGNOSIS — R60.0 PERIPHERAL EDEMA: Primary | ICD-10-CM

## 2023-09-28 DIAGNOSIS — R60.9 SWELLING: ICD-10-CM

## 2023-09-28 LAB
ANION GAP SERPL CALC-SCNC: 10 MEQ/L
BASOPHILS # BLD AUTO: 0.06 X10(3)/MCL
BASOPHILS NFR BLD AUTO: 1.1 %
BNP BLD-MCNC: 359.1 PG/ML
BUN SERPL-MCNC: 13.9 MG/DL (ref 8.4–25.7)
CALCIUM SERPL-MCNC: 9 MG/DL (ref 8.8–10)
CHLORIDE SERPL-SCNC: 109 MMOL/L (ref 98–107)
CO2 SERPL-SCNC: 23 MMOL/L (ref 23–31)
CREAT SERPL-MCNC: 0.98 MG/DL (ref 0.73–1.18)
CREAT/UREA NIT SERPL: 14
D DIMER PPP IA.FEU-MCNC: 0.86 UG/ML FEU (ref 0–0.5)
EOSINOPHIL # BLD AUTO: 0.1 X10(3)/MCL (ref 0–0.9)
EOSINOPHIL NFR BLD AUTO: 1.9 %
ERYTHROCYTE [DISTWIDTH] IN BLOOD BY AUTOMATED COUNT: 14 % (ref 11.5–17)
GFR SERPLBLD CREATININE-BSD FMLA CKD-EPI: >60 MLS/MIN/1.73/M2
GLUCOSE SERPL-MCNC: 99 MG/DL (ref 82–115)
HCT VFR BLD AUTO: 39.4 % (ref 42–52)
HGB BLD-MCNC: 13 G/DL (ref 14–18)
IMM GRANULOCYTES # BLD AUTO: 0.03 X10(3)/MCL (ref 0–0.04)
IMM GRANULOCYTES NFR BLD AUTO: 0.6 %
LYMPHOCYTES # BLD AUTO: 1.47 X10(3)/MCL (ref 0.6–4.6)
LYMPHOCYTES NFR BLD AUTO: 27.2 %
MCH RBC QN AUTO: 29.3 PG (ref 27–31)
MCHC RBC AUTO-ENTMCNC: 33 G/DL (ref 33–36)
MCV RBC AUTO: 88.9 FL (ref 80–94)
MONOCYTES # BLD AUTO: 0.66 X10(3)/MCL (ref 0.1–1.3)
MONOCYTES NFR BLD AUTO: 12.2 %
NEUTROPHILS # BLD AUTO: 3.08 X10(3)/MCL (ref 2.1–9.2)
NEUTROPHILS NFR BLD AUTO: 57 %
NRBC BLD AUTO-RTO: 0 %
PLATELET # BLD AUTO: 202 X10(3)/MCL (ref 130–400)
PMV BLD AUTO: 9.6 FL (ref 7.4–10.4)
POTASSIUM SERPL-SCNC: 3.2 MMOL/L (ref 3.5–5.1)
RBC # BLD AUTO: 4.43 X10(6)/MCL (ref 4.7–6.1)
SODIUM SERPL-SCNC: 142 MMOL/L (ref 136–145)
WBC # SPEC AUTO: 5.4 X10(3)/MCL (ref 4.5–11.5)

## 2023-09-28 PROCEDURE — 85379 FIBRIN DEGRADATION QUANT: CPT | Performed by: EMERGENCY MEDICINE

## 2023-09-28 PROCEDURE — 80048 BASIC METABOLIC PNL TOTAL CA: CPT | Performed by: EMERGENCY MEDICINE

## 2023-09-28 PROCEDURE — 85025 COMPLETE CBC W/AUTO DIFF WBC: CPT | Performed by: EMERGENCY MEDICINE

## 2023-09-28 PROCEDURE — 25000003 PHARM REV CODE 250: Performed by: EMERGENCY MEDICINE

## 2023-09-28 PROCEDURE — 99283 EMERGENCY DEPT VISIT LOW MDM: CPT | Mod: 25

## 2023-09-28 PROCEDURE — 83880 ASSAY OF NATRIURETIC PEPTIDE: CPT | Performed by: EMERGENCY MEDICINE

## 2023-09-28 RX ORDER — FUROSEMIDE 20 MG/1
40 TABLET ORAL
Status: COMPLETED | OUTPATIENT
Start: 2023-09-28 | End: 2023-09-28

## 2023-09-28 RX ADMIN — FUROSEMIDE 40 MG: 20 TABLET ORAL at 09:09

## 2023-09-29 NOTE — ED PROVIDER NOTES
ED PROVIDER NOTE  9/28/2023    CHIEF COMPLAINT:   Chief Complaint   Patient presents with    Leg Swelling     Pt states bilateral leg swelling (x)4 weeks. Vss. nadn       HISTORY OF PRESENT ILLNESS:   Delio Daniel Jr. is a 68 y.o. male who presents with chief complaint Leg swelling.  Presents with worsening of his chronic peripheral edema complaining of having some heaviness in his legs and pain in his calves.  Reports he has been taking his diuretic without any improvement.  States that he has some compression socks that are supposed to arrive in the next couple of days.    The history is provided by the patient.         REVIEW OF SYSTEMS: as noted in the HPI.  NURSING NOTES REVIEWED      PAST MEDICAL/SURGICAL HISTORY:   Past Medical History:   Diagnosis Date    Arthritis     Atrial fibrillation     BPH (benign prostatic hyperplasia)     Cardiac arrest     Coronary artery disease     Cyst, kidney, acquired     Diverticulosis     Hyperlipidemia     Hypertension     MI (myocardial infarction)     Obesity     Steatosis of liver       Past Surgical History:   Procedure Laterality Date    A-V CARDIAC PACEMAKER INSERTION Right     CARDIAC CATHETERIZATION      COLONOSCOPY W/ BIOPSIES      excision of colon         FAMILY HISTORY:   Family History   Problem Relation Age of Onset    Hypertension Mother     Hypertension Father     Hypertension Sister        SOCIAL HISTORY:   Social History     Tobacco Use    Smoking status: Former    Smokeless tobacco: Never   Substance Use Topics    Alcohol use: Not Currently    Drug use: Not Currently       ALLERGIES: Review of patient's allergies indicates:  No Known Allergies    PHYSICAL EXAM:  Initial Vitals [09/28/23 2033]   BP Pulse Resp Temp SpO2   (!) 145/104 102 20 97.7 °F (36.5 °C) 98 %      MAP       --         Physical Exam    Nursing note and vitals reviewed.  Constitutional: He appears well-developed and well-nourished. No distress.   HENT:   Head: Normocephalic and  atraumatic.   Nose: Nose normal.   Mouth/Throat: Oropharynx is clear and moist and mucous membranes are normal.   Eyes: Conjunctivae and EOM are normal. Pupils are equal, round, and reactive to light.   Neck: Neck supple. No tracheal deviation present.   Cardiovascular:  Normal rate, regular rhythm, normal heart sounds, intact distal pulses and normal pulses.           Pulmonary/Chest: Effort normal and breath sounds normal. No respiratory distress.   Abdominal: Abdomen is soft. There is no abdominal tenderness. There is no rebound and no guarding.   Musculoskeletal:         General: Normal range of motion.      Cervical back: Neck supple.      Right lower leg: 3+ Pitting Edema present.      Left lower leg: 3+ Pitting Edema present.     Neurological: He is alert and oriented to person, place, and time. GCS eye subscore is 4. GCS verbal subscore is 5. GCS motor subscore is 6.   CN II-XII intact. Moves all extremities. No gross sensory or motor deficits.   Skin: Skin is warm, dry and intact.   Mild erythema and swelling to bilateral lower legs.   Psychiatric: He has a normal mood and affect. His speech is normal and behavior is normal. Judgment and thought content normal. Cognition and memory are normal.         RESULTS:  Labs Reviewed   BASIC METABOLIC PANEL - Abnormal; Notable for the following components:       Result Value    Potassium Level 3.2 (*)     Chloride 109 (*)     All other components within normal limits   B-TYPE NATRIURETIC PEPTIDE - Abnormal; Notable for the following components:    Natriuretic Peptide 359.1 (*)     All other components within normal limits   CBC WITH DIFFERENTIAL - Abnormal; Notable for the following components:    RBC 4.43 (*)     Hgb 13.0 (*)     Hct 39.4 (*)     All other components within normal limits   D DIMER, QUANTITATIVE - Abnormal; Notable for the following components:    D-Dimer 0.86 (*)     All other components within normal limits   CBC W/ AUTO DIFFERENTIAL    Narrative:      The following orders were created for panel order CBC auto differential.  Procedure                               Abnormality         Status                     ---------                               -----------         ------                     CBC with Differential[[id number]]       Abnormal            Final result                 Please view results for these tests on the individual orders.     Imaging Results    None         PROCEDURES:  Procedures    ECG:       ED COURSE AND MEDICAL DECISION MAKING:  Medications   furosemide tablet 40 mg (40 mg Oral Given 9/28/23 2138)     ED Course as of 09/29/23 0010   u Sep 28, 2023   2132 WBC: 5.40 [IB]   2132 Hemoglobin(!): 13.0 [IB]   2132 Platelets: 202 [IB]   2148 D-Dimer(!): 0.86 [IB]   2155 BUN: 13.9 [IB]   2155 Creatinine: 0.98 [IB]   2215 BNP(!): 359.1 [IB]      ED Course User Index  [IB] Kelvin Salcido, DO        Medical Decision Making  68-year-old male who presents with persistent lower extremity edema now complaining of some pain in the back of his calves.  He has been evaluated multiple times for this and is on Lasix which he states he has been taking.  CBC and BMP are grossly unremarkable.  .  D-dimer elevated at 0.86 so venous duplex ultrasound was obtained of bilateral lower extremities to rule out DVT, and no occlusive thrombus was identified.  Instructed to follow up with his PCP and take his medications as prescribed and elevate his legs and decrease his salt intake.  Given strict ED return precautions. I have spoken with the patient and/or caregivers. I have explained the patient's condition, diagnoses and treatment plan based on the information available to me at this time. I have answered the patient's and/or caregiver's questions and addressed any concerns. The patient and/or caregivers have as good an understanding of the patient's diagnosis, condition and treatment plan as can be expected at this point. The vital signs have been  stable. The patient's condition is stable and appropriate for discharge from the emergency department.     The patient will pursue further outpatient evaluation with the primary care physician or other designated or consulting physician as outlined in the discharge instructions. The patient and/or caregivers are agreeable to this plan of care and follow-up instructions have been explained in detail. The patient and/or caregivers have received these instructions in written format and have expressed an understanding of the discharge instructions. The patient and/or caregivers are aware that any significant change in condition or worsening of symptoms should prompt an immediate return to this or the closest emergency department or a call to 911.    Amount and/or Complexity of Data Reviewed  Labs: ordered. Decision-making details documented in ED Course.  Radiology: ordered.    Risk  Prescription drug management.        CLINICAL IMPRESSION:  1. Peripheral edema    2. Swelling        DISPOSITION:   ED Disposition Condition    Discharge Stable            ED Prescriptions    None       Follow-up Information       Follow up With Specialties Details Why Contact Info    Magdi Rivera DO Internal Medicine Schedule an appointment as soon as possible for a visit   [address] [facility] [address]  [phone]      [facility] Emergency Medicine  If symptoms worsen [address] [facility] [id number] [phone]               Kelvin Salcido DO  09/29/23 0010

## 2023-09-30 ENCOUNTER — HOSPITAL ENCOUNTER (EMERGENCY)
Facility: HOSPITAL | Age: 68
Discharge: HOME | End: 2023-09-30
Attending: EMERGENCY MEDICINE
Payer: MEDICARE

## 2023-09-30 VITALS
SYSTOLIC BLOOD PRESSURE: 120 MMHG | BODY MASS INDEX: 39.67 KG/M2 | TEMPERATURE: 98 F | RESPIRATION RATE: 15 BRPM | HEIGHT: 65 IN | HEART RATE: 65 BPM | OXYGEN SATURATION: 100 % | WEIGHT: 238.13 LBS | DIASTOLIC BLOOD PRESSURE: 76 MMHG

## 2023-09-30 DIAGNOSIS — R60.0 PERIPHERAL EDEMA: Primary | ICD-10-CM

## 2023-09-30 PROCEDURE — 25000003 PHARM REV CODE 250: Performed by: EMERGENCY MEDICINE

## 2023-09-30 PROCEDURE — 99283 EMERGENCY DEPT VISIT LOW MDM: CPT

## 2023-09-30 RX ORDER — TORSEMIDE 10 MG/1
10 TABLET ORAL DAILY
Qty: 30 TABLET | Refills: 0 | Status: SHIPPED | OUTPATIENT
Start: 2023-09-30 | End: 2023-10-26 | Stop reason: SDUPTHER

## 2023-09-30 RX ORDER — FUROSEMIDE 20 MG/1
40 TABLET ORAL
Status: COMPLETED | OUTPATIENT
Start: 2023-09-30 | End: 2023-09-30

## 2023-09-30 RX ADMIN — FUROSEMIDE 40 MG: 20 TABLET ORAL at 03:09

## 2023-09-30 NOTE — ED PROVIDER NOTES
ED PROVIDER NOTE  9/30/2023    CHIEF COMPLAINT:   Chief Complaint   Patient presents with    Leg Swelling     Pt c/o bilat leg swelling and pain for months. He states he is getting his compression hose in 2 days. Pt ambulates with cane.        HISTORY OF PRESENT ILLNESS:   Delio Daniel Jr. is a 68 y.o. male who presents with chief complaint Leg pain and swelling.  Symptoms have been persistent for a while now, states he is taking his Lasix as directed but does not feel that it helped significantly.    The history is provided by the patient.         REVIEW OF SYSTEMS: as noted in the HPI.  NURSING NOTES REVIEWED      PAST MEDICAL/SURGICAL HISTORY:   Past Medical History:   Diagnosis Date    Arthritis     Atrial fibrillation     BPH (benign prostatic hyperplasia)     Cardiac arrest     Coronary artery disease     Cyst, kidney, acquired     Diverticulosis     Hyperlipidemia     Hypertension     MI (myocardial infarction)     Obesity     Steatosis of liver       Past Surgical History:   Procedure Laterality Date    A-V CARDIAC PACEMAKER INSERTION Right     CARDIAC CATHETERIZATION      COLONOSCOPY W/ BIOPSIES      excision of colon         FAMILY HISTORY:   Family History   Problem Relation Age of Onset    Hypertension Mother     Hypertension Father     Hypertension Sister        SOCIAL HISTORY:   Social History     Tobacco Use    Smoking status: Former    Smokeless tobacco: Never   Substance Use Topics    Alcohol use: Not Currently    Drug use: Not Currently       ALLERGIES: Review of patient's allergies indicates:  No Known Allergies    PHYSICAL EXAM:  Initial Vitals [09/30/23 0342]   BP Pulse Resp Temp SpO2   120/76 65 15 97.9 °F (36.6 °C) 100 %      MAP       --         Physical Exam    Nursing note and vitals reviewed.  Constitutional: He appears well-developed and well-nourished. No distress.   HENT:   Head: Normocephalic and atraumatic.   Nose: Nose normal.   Mouth/Throat: Oropharynx is clear and moist and mucous  membranes are normal.   Eyes: Conjunctivae and EOM are normal. Pupils are equal, round, and reactive to light.   Neck: Neck supple. No tracheal deviation present.   Cardiovascular:  Normal rate, regular rhythm, normal heart sounds, intact distal pulses and normal pulses.           Pulmonary/Chest: Effort normal and breath sounds normal. No respiratory distress.   Abdominal: Abdomen is soft. There is no abdominal tenderness. There is no rebound and no guarding.   Musculoskeletal:         General: Normal range of motion.      Cervical back: Neck supple.      Right lower le+ Pitting Edema present.      Left lower le+ Pitting Edema present.     Neurological: He is alert and oriented to person, place, and time. GCS eye subscore is 4. GCS verbal subscore is 5. GCS motor subscore is 6.   CN II-XII intact. Moves all extremities. No gross sensory or motor deficits.   Skin: Skin is warm, dry and intact.   Psychiatric: He has a normal mood and affect. His speech is normal and behavior is normal. Judgment and thought content normal. Cognition and memory are normal.         RESULTS:  Labs Reviewed - No data to display  Imaging Results    None         PROCEDURES:  Procedures    ECG:       ED COURSE AND MEDICAL DECISION MAKING:  Medications   furosemide tablet 40 mg (40 mg Oral Given 23 0343)           Medical Decision Making  Well-appearing 68-year-old male who presents with lower extremity pain and swelling.  Seen multiple times in the ED for the same and had extensive workup performed just yesterday which was negative for DVT.  Discussed that if the Lasix is not working would consider switching him to torsemide to see if this is more effective for him.  Instructed he was to stop the Lasix and start torsemide 10 mg daily and that both of these medications are not to be taken together, he acknowledges understanding.  Given strict ED return precautions. I have spoken with the patient and/or caregivers. I have  explained the patient's condition, diagnoses and treatment plan based on the information available to me at this time. I have answered the patient's and/or caregiver's questions and addressed any concerns. The patient and/or caregivers have as good an understanding of the patient's diagnosis, condition and treatment plan as can be expected at this point. The vital signs have been stable. The patient's condition is stable and appropriate for discharge from the emergency department.     The patient will pursue further outpatient evaluation with the primary care physician or other designated or consulting physician as outlined in the discharge instructions. The patient and/or caregivers are agreeable to this plan of care and follow-up instructions have been explained in detail. The patient and/or caregivers have received these instructions in written format and have expressed an understanding of the discharge instructions. The patient and/or caregivers are aware that any significant change in condition or worsening of symptoms should prompt an immediate return to this or the closest emergency department or a call to 911.    Amount and/or Complexity of Data Reviewed  External Data Reviewed: labs, radiology and notes.    Risk  Prescription drug management.        CLINICAL IMPRESSION:  1. Peripheral edema        DISPOSITION:   ED Disposition Condition    Discharge Stable            ED Prescriptions       Medication Sig Dispense Start Date End Date Auth. Provider    torsemide (DEMADEX) 10 MG Tab Take 1 tablet (10 mg total) by mouth once daily. 30 tablet 9/30/2023 10/30/2023 Kelvin Salcido, DO          Follow-up Information       Follow up With Specialties Details Why Contact Info    Magdi Rivera, DO Internal Medicine Schedule an appointment as soon as possible for a visit   [address] [facility] [address]  [phone]      [facility] Emergency Medicine  If symptoms worsen [address]  [facility] [address]  [phone]               Kelvin Salcido,   09/30/23 0737

## 2023-10-05 ENCOUNTER — HOSPITAL ENCOUNTER (EMERGENCY)
Facility: HOSPITAL | Age: 68
Discharge: HOME | End: 2023-10-05
Attending: STUDENT IN AN ORGANIZED HEALTH CARE EDUCATION/TRAINING PROGRAM
Payer: MEDICARE

## 2023-10-05 VITALS
SYSTOLIC BLOOD PRESSURE: 120 MMHG | OXYGEN SATURATION: 97 % | DIASTOLIC BLOOD PRESSURE: 90 MMHG | HEIGHT: 69 IN | HEART RATE: 63 BPM | TEMPERATURE: 99 F | RESPIRATION RATE: 18 BRPM | BODY MASS INDEX: 36.29 KG/M2 | WEIGHT: 245 LBS

## 2023-10-05 DIAGNOSIS — R60.0 PERIPHERAL EDEMA: Primary | ICD-10-CM

## 2023-10-05 DIAGNOSIS — R06.09 DYSPNEA ON EXERTION: ICD-10-CM

## 2023-10-05 DIAGNOSIS — R60.0 BILATERAL LOWER EXTREMITY EDEMA: ICD-10-CM

## 2023-10-05 LAB
ALBUMIN SERPL-MCNC: 3.9 G/DL (ref 3.4–4.8)
ALBUMIN/GLOB SERPL: 1.1 RATIO (ref 1.1–2)
ALP SERPL-CCNC: 66 UNIT/L (ref 40–150)
ALT SERPL-CCNC: 20 UNIT/L (ref 0–55)
AST SERPL-CCNC: 19 UNIT/L (ref 5–34)
BASOPHILS # BLD AUTO: 0.06 X10(3)/MCL
BASOPHILS NFR BLD AUTO: 1 %
BILIRUB SERPL-MCNC: 1.4 MG/DL
BNP BLD-MCNC: 320.5 PG/ML
BUN SERPL-MCNC: 13.2 MG/DL (ref 8.4–25.7)
CALCIUM SERPL-MCNC: 9.1 MG/DL (ref 8.8–10)
CHLORIDE SERPL-SCNC: 107 MMOL/L (ref 98–107)
CO2 SERPL-SCNC: 24 MMOL/L (ref 23–31)
CREAT SERPL-MCNC: 1.17 MG/DL (ref 0.73–1.18)
EOSINOPHIL # BLD AUTO: 0.11 X10(3)/MCL (ref 0–0.9)
EOSINOPHIL NFR BLD AUTO: 1.8 %
ERYTHROCYTE [DISTWIDTH] IN BLOOD BY AUTOMATED COUNT: 14.6 % (ref 11.5–17)
GFR SERPLBLD CREATININE-BSD FMLA CKD-EPI: >60 MLS/MIN/1.73/M2
GLOBULIN SER-MCNC: 3.6 GM/DL (ref 2.4–3.5)
GLUCOSE SERPL-MCNC: 85 MG/DL (ref 82–115)
HCT VFR BLD AUTO: 43.8 % (ref 42–52)
HGB BLD-MCNC: 14.2 G/DL (ref 14–18)
IMM GRANULOCYTES # BLD AUTO: 0.03 X10(3)/MCL (ref 0–0.04)
IMM GRANULOCYTES NFR BLD AUTO: 0.5 %
LYMPHOCYTES # BLD AUTO: 1.59 X10(3)/MCL (ref 0.6–4.6)
LYMPHOCYTES NFR BLD AUTO: 26.2 %
MCH RBC QN AUTO: 30.2 PG (ref 27–31)
MCHC RBC AUTO-ENTMCNC: 32.4 G/DL (ref 33–36)
MCV RBC AUTO: 93.2 FL (ref 80–94)
MONOCYTES # BLD AUTO: 0.53 X10(3)/MCL (ref 0.1–1.3)
MONOCYTES NFR BLD AUTO: 8.7 %
NEUTROPHILS # BLD AUTO: 3.76 X10(3)/MCL (ref 2.1–9.2)
NEUTROPHILS NFR BLD AUTO: 61.8 %
NRBC BLD AUTO-RTO: 0 %
PLATELET # BLD AUTO: 208 X10(3)/MCL (ref 130–400)
PMV BLD AUTO: 9.6 FL (ref 7.4–10.4)
POTASSIUM SERPL-SCNC: 3.6 MMOL/L (ref 3.5–5.1)
PROT SERPL-MCNC: 7.5 GM/DL (ref 5.8–7.6)
RBC # BLD AUTO: 4.7 X10(6)/MCL (ref 4.7–6.1)
SODIUM SERPL-SCNC: 142 MMOL/L (ref 136–145)
TROPONIN I SERPL-MCNC: <0.01 NG/ML (ref 0–0.04)
WBC # SPEC AUTO: 6.08 X10(3)/MCL (ref 4.5–11.5)

## 2023-10-05 PROCEDURE — 84484 ASSAY OF TROPONIN QUANT: CPT | Performed by: PHYSICIAN ASSISTANT

## 2023-10-05 PROCEDURE — 25000003 PHARM REV CODE 250: Performed by: PHYSICIAN ASSISTANT

## 2023-10-05 PROCEDURE — 85025 COMPLETE CBC W/AUTO DIFF WBC: CPT | Performed by: PHYSICIAN ASSISTANT

## 2023-10-05 PROCEDURE — 99285 EMERGENCY DEPT VISIT HI MDM: CPT | Mod: 25

## 2023-10-05 PROCEDURE — 93005 ELECTROCARDIOGRAM TRACING: CPT

## 2023-10-05 PROCEDURE — 80053 COMPREHEN METABOLIC PANEL: CPT | Performed by: PHYSICIAN ASSISTANT

## 2023-10-05 PROCEDURE — 83880 ASSAY OF NATRIURETIC PEPTIDE: CPT | Performed by: PHYSICIAN ASSISTANT

## 2023-10-05 RX ORDER — FUROSEMIDE 20 MG/1
20 TABLET ORAL DAILY
Status: DISCONTINUED | OUTPATIENT
Start: 2023-10-06 | End: 2023-10-05

## 2023-10-05 RX ORDER — FUROSEMIDE 20 MG/1
20 TABLET ORAL DAILY
Status: DISCONTINUED | OUTPATIENT
Start: 2023-10-05 | End: 2023-10-05 | Stop reason: HOSPADM

## 2023-10-05 RX ADMIN — FUROSEMIDE 20 MG: 20 TABLET ORAL at 07:10

## 2023-10-06 NOTE — DISCHARGE INSTRUCTIONS
Take all regular medications as prescribed.     Take 2 tablets of your new fluid pill (torsemide).    Elevate your legs above your heart and wear MONE hose (prescription compression socks during the day and off at night).    Return to ER for any changes or worsening of symptoms.

## 2023-10-14 ENCOUNTER — HOSPITAL ENCOUNTER (EMERGENCY)
Facility: HOSPITAL | Age: 68
Discharge: HOME | End: 2023-10-14
Attending: EMERGENCY MEDICINE
Payer: MEDICARE

## 2023-10-14 VITALS
WEIGHT: 245 LBS | OXYGEN SATURATION: 100 % | DIASTOLIC BLOOD PRESSURE: 87 MMHG | HEIGHT: 69 IN | BODY MASS INDEX: 36.29 KG/M2 | SYSTOLIC BLOOD PRESSURE: 125 MMHG | TEMPERATURE: 98 F | RESPIRATION RATE: 18 BRPM | HEART RATE: 87 BPM

## 2023-10-14 DIAGNOSIS — R60.9 EDEMA: Primary | ICD-10-CM

## 2023-10-14 DIAGNOSIS — E87.70 HYPERVOLEMIA, UNSPECIFIED HYPERVOLEMIA TYPE: ICD-10-CM

## 2023-10-14 DIAGNOSIS — I50.9 CONGESTIVE HEART FAILURE, UNSPECIFIED HF CHRONICITY, UNSPECIFIED HEART FAILURE TYPE: ICD-10-CM

## 2023-10-14 DIAGNOSIS — Z91.148 NON COMPLIANCE W MEDICATION REGIMEN: ICD-10-CM

## 2023-10-14 LAB
ALBUMIN SERPL-MCNC: 3.6 G/DL (ref 3.4–4.8)
ALBUMIN/GLOB SERPL: 1.1 RATIO (ref 1.1–2)
ALP SERPL-CCNC: 59 UNIT/L (ref 40–150)
ALT SERPL-CCNC: 18 UNIT/L (ref 0–55)
AST SERPL-CCNC: 23 UNIT/L (ref 5–34)
BASOPHILS # BLD AUTO: 0.07 X10(3)/MCL
BASOPHILS NFR BLD AUTO: 1.1 %
BILIRUB SERPL-MCNC: 1.3 MG/DL
BNP BLD-MCNC: 363.1 PG/ML
BUN SERPL-MCNC: 15.4 MG/DL (ref 8.4–25.7)
CALCIUM SERPL-MCNC: 9.1 MG/DL (ref 8.8–10)
CHLORIDE SERPL-SCNC: 105 MMOL/L (ref 98–107)
CK MB SERPL-MCNC: 2.6 NG/ML
CK SERPL-CCNC: 267 U/L (ref 30–200)
CO2 SERPL-SCNC: 25 MMOL/L (ref 23–31)
CREAT SERPL-MCNC: 1.06 MG/DL (ref 0.73–1.18)
EOSINOPHIL # BLD AUTO: 0.09 X10(3)/MCL (ref 0–0.9)
EOSINOPHIL NFR BLD AUTO: 1.5 %
ERYTHROCYTE [DISTWIDTH] IN BLOOD BY AUTOMATED COUNT: 14.6 % (ref 11.5–17)
GFR SERPLBLD CREATININE-BSD FMLA CKD-EPI: >60 MLS/MIN/1.73/M2
GLOBULIN SER-MCNC: 3.4 GM/DL (ref 2.4–3.5)
GLUCOSE SERPL-MCNC: 92 MG/DL (ref 82–115)
HCT VFR BLD AUTO: 42.8 % (ref 42–52)
HGB BLD-MCNC: 14 G/DL (ref 14–18)
IMM GRANULOCYTES # BLD AUTO: 0.04 X10(3)/MCL (ref 0–0.04)
IMM GRANULOCYTES NFR BLD AUTO: 0.7 %
LYMPHOCYTES # BLD AUTO: 1.6 X10(3)/MCL (ref 0.6–4.6)
LYMPHOCYTES NFR BLD AUTO: 26.1 %
MAGNESIUM SERPL-MCNC: 1.7 MG/DL (ref 1.6–2.6)
MCH RBC QN AUTO: 29.5 PG (ref 27–31)
MCHC RBC AUTO-ENTMCNC: 32.7 G/DL (ref 33–36)
MCV RBC AUTO: 90.1 FL (ref 80–94)
MONOCYTES # BLD AUTO: 0.78 X10(3)/MCL (ref 0.1–1.3)
MONOCYTES NFR BLD AUTO: 12.7 %
NEUTROPHILS # BLD AUTO: 3.54 X10(3)/MCL (ref 2.1–9.2)
NEUTROPHILS NFR BLD AUTO: 57.9 %
NRBC BLD AUTO-RTO: 0 %
PLATELET # BLD AUTO: 219 X10(3)/MCL (ref 130–400)
PMV BLD AUTO: 9.8 FL (ref 7.4–10.4)
POTASSIUM SERPL-SCNC: 3.2 MMOL/L (ref 3.5–5.1)
PROT SERPL-MCNC: 7 GM/DL (ref 5.8–7.6)
RBC # BLD AUTO: 4.75 X10(6)/MCL (ref 4.7–6.1)
SODIUM SERPL-SCNC: 141 MMOL/L (ref 136–145)
TROPONIN I SERPL-MCNC: 0.02 NG/ML (ref 0–0.04)
WBC # SPEC AUTO: 6.12 X10(3)/MCL (ref 4.5–11.5)

## 2023-10-14 PROCEDURE — 99284 EMERGENCY DEPT VISIT MOD MDM: CPT

## 2023-10-14 PROCEDURE — 83880 ASSAY OF NATRIURETIC PEPTIDE: CPT | Performed by: EMERGENCY MEDICINE

## 2023-10-14 PROCEDURE — 80053 COMPREHEN METABOLIC PANEL: CPT | Performed by: EMERGENCY MEDICINE

## 2023-10-14 PROCEDURE — 82550 ASSAY OF CK (CPK): CPT | Performed by: EMERGENCY MEDICINE

## 2023-10-14 PROCEDURE — 83735 ASSAY OF MAGNESIUM: CPT | Performed by: EMERGENCY MEDICINE

## 2023-10-14 PROCEDURE — 84484 ASSAY OF TROPONIN QUANT: CPT | Performed by: EMERGENCY MEDICINE

## 2023-10-14 PROCEDURE — 85025 COMPLETE CBC W/AUTO DIFF WBC: CPT | Performed by: EMERGENCY MEDICINE

## 2023-10-14 PROCEDURE — 93005 ELECTROCARDIOGRAM TRACING: CPT

## 2023-10-14 PROCEDURE — 82553 CREATINE MB FRACTION: CPT | Performed by: EMERGENCY MEDICINE

## 2023-10-14 NOTE — ED PROVIDER NOTES
"Encounter Date: 10/14/2023       History     Chief Complaint   Patient presents with    Leg Pain     States bilateral leg pain since last night.  Has appointment with primary care on Monday.       This is a 68-year-old male who presents with continuing symmetric bilateral lower extremity edema.  He is had quite a few recent visits for same.  Over the course of these visits lower extremity ultrasounds have excluded DVT.  Working hypothesis is some degree of congestive failure, for which he has been prescribed Lasix.  He is scheduled follow up on Monday.  The most recent objective data appear to be from October 5th.  At that time, BNP was mildly elevated.  He presents today endorsing that he is taking his "fluid pills" and he is presently wearing compression stockings.  He is denying dyspnea, fever, chills, cough, nausea, vomiting.      Leg Pain   Incident location: Has been progressive, regardless of location; There was no injury mechanism. The incident occurred several weeks ago. The pain is present in the left leg and right leg. The quality of the pain is described as aching. The pain is at a severity of 2/10. The pain has been Fluctuating (Waxing and waning, reportedly improved with diuretics;) since onset. He reports no foreign bodies present. Exacerbated by: Medical noncompliance; Treatments tried: Prescribed fluid pills The treatment provided mild relief.     Review of patient's allergies indicates:  No Known Allergies  Past Medical History:   Diagnosis Date    Arthritis     Atrial fibrillation     BPH (benign prostatic hyperplasia)     Cardiac arrest     Coronary artery disease     Cyst, kidney, acquired     Diverticulosis     Hyperlipidemia     Hypertension     MI (myocardial infarction)     Obesity     Steatosis of liver      Past Surgical History:   Procedure Laterality Date    A-V CARDIAC PACEMAKER INSERTION Right     CARDIAC CATHETERIZATION      COLONOSCOPY W/ BIOPSIES      excision of colon   "     Family History   Problem Relation Age of Onset    Hypertension Mother     Hypertension Father     Hypertension Sister      Social History     Tobacco Use    Smoking status: Former    Smokeless tobacco: Never   Substance Use Topics    Alcohol use: Not Currently    Drug use: Not Currently     Review of Systems   All other systems reviewed and are negative.      Physical Exam     Initial Vitals [10/14/23 0544]   BP Pulse Resp Temp SpO2   (!) 154/100 80 18 97.5 °F (36.4 °C) 100 %      MAP       --         Physical Exam    Nursing note and vitals reviewed.  Constitutional: He appears well-developed and well-nourished. He is not diaphoretic. No distress.   HENT:   Head: Normocephalic and atraumatic.   Eyes: EOM are normal. Pupils are equal, round, and reactive to light. Right eye exhibits no discharge. Left eye exhibits no discharge.   Neck: Neck supple. No thyromegaly present. No tracheal deviation present. No JVD present.   Normal range of motion.  Cardiovascular:  Normal rate, regular rhythm, normal heart sounds and intact distal pulses.           No murmur heard.  Pulmonary/Chest: Breath sounds normal. No stridor. No respiratory distress. He has no wheezes. He has no rhonchi. He has no rales.   Abdominal: Abdomen is soft. He exhibits no distension. There is no abdominal tenderness. There is no rebound and no guarding.   Musculoskeletal:         General: Edema present. No tenderness. Normal range of motion.      Cervical back: Normal range of motion and neck supple.      Comments: Symmetric, mild lower extremity edema ;     Neurological: He is alert and oriented to person, place, and time. He has normal strength. No cranial nerve deficit. GCS score is 15. GCS eye subscore is 4. GCS verbal subscore is 5. GCS motor subscore is 6.   Skin: Skin is warm and dry. Capillary refill takes less than 2 seconds. No rash and no abscess noted. No erythema. No pallor.   Psychiatric: He has a normal mood and affect. His behavior  is normal. Judgment and thought content normal.         ED Course   Procedures  Labs Reviewed   COMPREHENSIVE METABOLIC PANEL - Abnormal; Notable for the following components:       Result Value    Potassium Level 3.2 (*)     All other components within normal limits   B-TYPE NATRIURETIC PEPTIDE - Abnormal; Notable for the following components:    Natriuretic Peptide 363.1 (*)     All other components within normal limits   CK - Abnormal; Notable for the following components:    Creatine Kinase 267 (*)     All other components within normal limits   CBC WITH DIFFERENTIAL - Abnormal; Notable for the following components:    MCHC 32.7 (*)     All other components within normal limits   TROPONIN I - Normal   CK-MB - Normal   MAGNESIUM - Normal   CBC W/ AUTO DIFFERENTIAL    Narrative:     The following orders were created for panel order CBC auto differential.  Procedure                               Abnormality         Status                     ---------                               -----------         ------                     CBC with Differential[[id number]]       Abnormal            Final result                 Please view results for these tests on the individual orders.   EXTRA TUBES    Narrative:     The following orders were created for panel order EXTRA TUBES.  Procedure                               Abnormality         Status                     ---------                               -----------         ------                     Light Blue Top Hold[[id number]]                             In process                 Gold Top Hold[[id number]]                                   In process                   Please view results for these tests on the individual orders.   LIGHT BLUE TOP HOLD   GOLD TOP HOLD     EKG Readings: (Independently Interpreted)   - nsr at 77, non acute and non ischemic appearing ;       Imaging Results    None          Medications - No data to display  Medical Decision  Making  68-year-old male presents today with continued lower extremity edema, and intermittent compliance with prescribed diuretics.  Patient with multiple recent previous visits excluding DVT, excluding ACS.  He has scheduled follow up Monday for continued consideration of the case.  Most recent laboratory data are from 9 days ago.    Amount and/or Complexity of Data Reviewed  External Data Reviewed: labs, radiology and notes.     Details: Review of recent data demonstrates modestly increased BNP, repeatedly negative troponins, negative lower extremity Dopplers; working hypothesis congestive heart failure, uncertain cause of cardiomyopathy;  Labs: ordered. Decision-making details documented in ED Course.  ECG/medicine tests: ordered and independent interpretation performed. Decision-making details documented in ED Course.     Details: - nsr at 77, non acute and non ischemic appearing ;    Risk  Prescription drug management.  Risk Details: Risk found sufficient to warrant evaluation with objective data today.  Objective data are found in sequence with   Time comparisons.  Given follow up planned for Monday we will discharge patient with recommendation continue the outpatient medications including torsemide and follow up as planned.  Patient discharged home with return precautions, anticipatory guidance, follow-up instructions.               ED Course as of 10/14/23 0834   Sat Oct 14, 2023   0800 Ekg appears sinus at 77, non acute and non ischemic appearing ; [CT]   0801 Negative troponin ; [CT]   0801 Reassuring chemistries ; [CT]   0801 Reassuring hemogram ; [CT]   0830 Today's BNP in sequence with nearline time comparisons ; [CT]      ED Course User Index  [CT] Luis Hoyos MD                      Clinical Impression:   Final diagnoses:  [R60.9] Edema (Primary)  [I50.9] Congestive heart failure, unspecified HF chronicity, unspecified heart failure type  [Z91.148] Non compliance w medication  regimen  [E87.70] Hypervolemia, unspecified hypervolemia type        ED Disposition Condition    Discharge Stable          ED Prescriptions    None       Follow-up Information       Follow up With Specialties Details Why Contact Info    [facility] Dept Emergency Medicine  As needed, If symptoms worsen [address] [facility] [id number] [phone]    Magdi Rivera, DO Internal Medicine Call   [address] [facility] [address]  [phone]               Luis Hoyos MD  10/14/23 0834

## 2023-10-16 ENCOUNTER — OFFICE VISIT (OUTPATIENT)
Dept: INTERNAL MEDICINE | Facility: CLINIC | Age: 68
End: 2023-10-16
Payer: MEDICARE

## 2023-10-16 VITALS
TEMPERATURE: 97 F | HEIGHT: 69 IN | OXYGEN SATURATION: 99 % | DIASTOLIC BLOOD PRESSURE: 104 MMHG | RESPIRATION RATE: 20 BRPM | HEART RATE: 103 BPM | WEIGHT: 256.38 LBS | SYSTOLIC BLOOD PRESSURE: 178 MMHG | BODY MASS INDEX: 37.97 KG/M2

## 2023-10-16 DIAGNOSIS — M79.89 SWELLING OF LOWER EXTREMITY: Primary | ICD-10-CM

## 2023-10-16 DIAGNOSIS — Z01.89 ENCOUNTER FOR GERIATRIC ASSESSMENT: ICD-10-CM

## 2023-10-16 DIAGNOSIS — Z79.899 POLYPHARMACY: ICD-10-CM

## 2023-10-16 PROCEDURE — 99215 OFFICE O/P EST HI 40 MIN: CPT | Mod: PBBFAC | Performed by: INTERNAL MEDICINE

## 2023-10-16 RX ORDER — DILTIAZEM HYDROCHLORIDE 240 MG/1
240 CAPSULE, COATED, EXTENDED RELEASE ORAL DAILY
COMMUNITY
Start: 2023-09-28 | End: 2023-10-16

## 2023-10-16 NOTE — PROGRESS NOTES
Chief Complaint  Follow-up (Pt here today for f/u visit. )     HPI  Delio Daniel Jr. is a 68 y.o. male who has a past medical history of Arthritis, Atrial fibrillation, BPH (benign prostatic hyperplasia), Cardiac arrest, Coronary artery disease, Cyst, kidney, acquired, Diverticulosis, Hyperlipidemia, Hypertension, MI (myocardial infarction), Obesity, and Steatosis of liver.  He presents to clinic today for follow-up of chronic medical conditions.     Continues to have multiple visits to ED for Lower extremity edema and pain. He also continues to have issues with polypharmacy despite me having to adjust and discard medications at every visit. Compounding these issues is the fact that he spends the majority of his day in the hospital for unknwon reasons visiting with ancillary staff and hospital patients.     ROS  Review of Systems   Constitutional:  Negative for chills and fever.   Respiratory:  Negative for shortness of breath.    Cardiovascular:  Positive for leg swelling. Negative for chest pain.   Gastrointestinal:  Negative for nausea and vomiting.   Genitourinary:  Negative for dysuria.   Musculoskeletal:  Negative for myalgias.       PE  Vitals:    10/16/23 1410   BP: (!) 178/104   Pulse:    Resp:    Temp:           Physical Exam  Vitals and nursing note reviewed.   Constitutional:       General: He is not in acute distress.     Appearance: He is well-developed and well-groomed. He is obese. He is not ill-appearing, toxic-appearing or diaphoretic.   HENT:      Head: Normocephalic and atraumatic.   Eyes:      General: Lids are normal. Vision grossly intact.      Extraocular Movements: Extraocular movements intact.      Conjunctiva/sclera: Conjunctivae normal.      Pupils: Pupils are equal, round, and reactive to light.   Neck:      Trachea: Trachea normal.   Cardiovascular:      Rate and Rhythm: Normal rate. Rhythm irregular.      Chest Wall: PMI is not displaced.      Pulses: Normal pulses.      Heart  sounds: Normal heart sounds, S1 normal and S2 normal. No murmur heard.     No gallop.   Pulmonary:      Effort: Pulmonary effort is normal. No respiratory distress.      Breath sounds: Normal breath sounds. No decreased breath sounds, wheezing, rhonchi or rales.   Chest:      Chest wall: No tenderness.   Abdominal:      General: Bowel sounds are normal. There is no distension.      Palpations: Abdomen is soft. There is no mass.      Tenderness: There is no abdominal tenderness.   Musculoskeletal:         General: Swelling present. No tenderness. Normal range of motion.      Cervical back: Normal range of motion.      Right lower leg: Edema (1+) present.      Left lower leg: Edema (1+) present.   Skin:     General: Skin is warm and dry.      Coloration: Skin is not cyanotic, jaundiced or pale.   Neurological:      General: No focal deficit present.      Mental Status: He is alert and oriented to person, place, and time.      Gait: Gait is intact.          Assessment/Plan  Hypertension   -Blood pressure 178/104.   - Diltazem, metoporlol, losartan, and aldactone    Bilateral Lower extremity Swelling  - Continue Torsemide and leg compression    CAD with history of NSTEMI in 2003  Second-degree AV block s/p pacemaker now ICD   -Continue atorvastatin 40 mg daily and aspirin 81 mg daily  - Referred to [facility] ablation    A fib   -Currently asymptomatic, rate controlled   -metoprolol, diltiazem, and Xarelto    Abdominal pain in the setting of history of Small bowel carcinoid tumor s/p resection (2018)  -Currently followed by [facility] oncology and [facility] oncology  -CT chest/abdomen/pelvis: No mets. No acute changes    DOES NOT WANT VACCINES  Will have to unfortunately discharge from [facility] clinic as he does not comply with medical management.       Future Appointments   Date Time Provider Department Center   11/7/2023  8:15 AM PACEMAKER, UL CARDIOLOGY [facility] CHERELLE Blum    11/9/2023  9:00 AM Cyndie Villegas MD [facility] CARD  [facility]   12/11/2023  7:30 AM Magdi Rivera DO [facility] IM RES [facility]   8/12/2024  9:15 AM NURSE, [facility]   8/12/2024 10:20 AM [facility]  [facility] - PGY-3

## 2023-10-27 RX ORDER — TORSEMIDE 10 MG/1
10 TABLET ORAL DAILY
Qty: 30 TABLET | Refills: 0 | Status: SHIPPED | OUTPATIENT
Start: 2023-10-27 | End: 2023-12-01 | Stop reason: SDUPTHER

## 2023-10-30 ENCOUNTER — OFFICE VISIT (OUTPATIENT)
Dept: FAMILY MEDICINE | Facility: CLINIC | Age: 68
End: 2023-10-30
Payer: MEDICARE

## 2023-10-30 VITALS
SYSTOLIC BLOOD PRESSURE: 140 MMHG | DIASTOLIC BLOOD PRESSURE: 90 MMHG | WEIGHT: 251.13 LBS | RESPIRATION RATE: 20 BRPM | OXYGEN SATURATION: 100 % | HEIGHT: 69 IN | HEART RATE: 79 BPM | TEMPERATURE: 100 F | BODY MASS INDEX: 37.2 KG/M2

## 2023-10-30 DIAGNOSIS — Z01.89 ENCOUNTER FOR GERIATRIC ASSESSMENT: ICD-10-CM

## 2023-10-30 DIAGNOSIS — Z23 NEED FOR INFLUENZA VACCINATION: Primary | ICD-10-CM

## 2023-10-30 DIAGNOSIS — Z79.899 POLYPHARMACY: ICD-10-CM

## 2023-10-30 PROCEDURE — 90694 VACC AIIV4 NO PRSRV 0.5ML IM: CPT | Mod: PBBFAC

## 2023-10-30 PROCEDURE — G0008 ADMIN INFLUENZA VIRUS VAC: HCPCS | Mod: PBBFAC

## 2023-10-30 PROCEDURE — 99214 OFFICE O/P EST MOD 30 MIN: CPT | Mod: PBBFAC | Performed by: FAMILY MEDICINE

## 2023-10-30 RX ADMIN — INFLUENZA A VIRUS A/VICTORIA/4897/2022 IVR-238 (H1N1) ANTIGEN (FORMALDEHYDE INACTIVATED), INFLUENZA A VIRUS A/DARWIN/6/2021 IVR-227 (H3N2) ANTIGEN (FORMALDEHYDE INACTIVATED), INFLUENZA B VIRUS B/AUSTRIA/1359417/2021 BVR-26 ANTIGEN (FORMALDEHYDE INACTIVATED), INFLUENZA B VIRUS B/PHUKET/3073/2013 BVR-1B ANTIGEN (FORMALDEHYDE INACTIVATED) 0.5 ML: 15; 15; 15; 15 INJECTION, SUSPENSION INTRAMUSCULAR at 11:10

## 2023-10-30 NOTE — PROGRESS NOTES
"CC: polypharmacy    HPI: 67 y/o AAM presents to geriatric clinic after being referred for polypharmacy. Daughter is present and provides some history, although patient is completely aware and provides all medical history. He has no acute complaints today. He was having recent frequent ED visits for leg pain/swelling but states that has improved after starting torsemide 10mg daily. States he has been evaluated for peripheral vascular disease in [place], but no interventions needed yet.     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  PSH: bowel resection 2018, PM placement "several years ago" but chart review reveals dual chamber ICD placement  All: NKDA  Social hx: previous smoker, quit decades ago; prev drinker, quit; retired 6 years ago, prev worked at [employer] for 13 years  Meds: ASA 81mg, diltiazem 360mg daily, losartan 50mg daily, atorvastatin 40mg daily, xarelto 20mg daily, spironolactone 50mg daily, omeprazole 20mg daily, vit D 25mcg daily, metoprolol succinate 200mg BID, KlorCon 20meq BID, torsemide 10mg daily; previously on sucralfate 1g BID but has discontinued    MD Team:   Hem/Onc - [facility]  Cardiology - [facility],   EP Cards - [facility] Lu Camacho assessment:  Never drove  Lives @ sister's house but family members live with him; daughter lives close by and takes him home often  Uses 4-prong cane since 2018  No recent falls  Great appetite  Bowel/bladder ok  Sleep ok  Good mood  No subjective memory issues  Independent of ADLs and most IADLs, gets assistance with finances  Takes medications by himself    ROS: denies any chest pain, +dyspnea on exertion    P/E:  Vitals:    10/30/23 1012   BP: (!) 140/90   Pulse:    Resp:    Temp:      Vitals:    10/30/23 0942 10/30/23 1012   BP: (!) 153/84  Comment: pt did not take any bloodpressure medication today (!) 140/90   BP Location: Left arm Left arm   Patient " "Position: Sitting Sitting   BP Method: X-Large (Automatic) Large (Automatic)   Pulse: 79    Resp: 20    Temp: 99.5 °F (37.5 °C)    TempSrc: Oral    SpO2: 100%    Weight: 113.9 kg (251 lb 1.7 oz)    Height: 5' 9.02" (1.753 m)    Did not take BP meds until he came to clinic  Gen: AAO, NAD  CVS: +s1-s2, irregularly irregular rhythm  Resp: CTAB/L  Abd: +BS, soft, NT/ND  Ext: trace edema, compression stockings in place  Neuro-cog: good mood, clear thoughts, SLUMS 16/30    A/P: 69 y/o AAM with:  HTN  - cont diltiazem 360mg daily, losartan 50mg daily, spironolactone 50mg daily, metoprolol succinate 200mg BID  2. A-fib  - rate controlled  - cont diltiazem 360mg daily, metoprolol succinate 200mg BID, xarelto 20mg daily  - has ablation scheduled in [facility] 11/9/23  3. CAD/NICM  - cont atorvastatin 40mg daily and current medication regimen  4. H/o neuroendocrine tumor s/p resection  - cont to f/u with Hem-Onc  - surveillance scans 8/2024  5. Mild cognitive impairment  - will cont to monitor  - independent of ADLs/IADLs  6. H/o Vit D def  - cont supplement  - rpt @ next visit  7. HM  - flu shot today  - Tdap 5/2016  - Shingrix 12/2021, 3/2022  - PVX23 9/2019  - Pzmvrky24 9/2020  - needs [id number] @ next visit  - colonoscopy 12/27/18 - discuss f/u @ next visit    We did an extensive medication reconciliation at today's visit and discarded some duplicate bottles. However pt is very aware and cognizant of medications.   Will discuss LaPOST @ next visit.     Beth Daniel (dtr) [phone]  Ari Daniel (dtr) [phone]    Labs @ next visit: Vit D, BMP  RTC 6 weeks.        "

## 2023-11-03 NOTE — PROGRESS NOTES
CHIEF COMPLAINT:   Chief Complaint   Patient presents with    Follow-up     3 mos f/u denies cardiac targets at present                                                  HPI:  Delio Daniel Jr. 68 y.o. male w/ PMH of  has a past medical history of Arthritis, Atrial fibrillation, BPH (benign prostatic hyperplasia), Cardiac arrest, Coronary artery disease with STEMI in 2003, HFpEF (55%), 2nd degree AVB s/p PPM in 2017 since upgraded to dcICD in May 2018 due to VFIB arrest in [facility] 2018 due to prolonged QT and hypokalemia, Cyst, kidney, acquired, Diverticulosis, Hyperlipidemia, Hypertension, MI (myocardial infarction), Obesity, and Steatosis of liver., who presents to Cardiology Clinic for follow up and ongoing care.  At his last appointment patient presented for device interrogation with continued episodes of SVT, AFib in the 300s, and 32 nonsustained episodes.  It was also noted that he had continued mode switching.  He denied any cardiac complaints at that time.  Of note he was referred to Dr. Hernandez for AV steven ablation, as he will likely need BiV device upgrade, however patient did not follow up as recommended.  At last appointment patient was [place] device [place].  Per chart review and per patient it appears as if patient will actually undergo ablation on November 29th in [place].    Today the patient states that he is feeling well overall.  He denies any cardiac complaints today of chest pain, shortness on breath, palpitations, PND, orthopnea, lightheadedness, dizziness, syncope, or claudication symptoms.  Device check today revealed 49 episodes of AFib with RVR the longest lasting 3 minutes, otherwise unremarkable.  No changes were made on device check.  He states that he is able to complete his ADLs without any issues or ischemic symptoms.  He states that he is fairly active in his day-to-day life.  He denies any tobacco or illicit drug use.  He reports compliance with  all medications, however he states that he did not have time to take medications this morning before his appointment.                                                                                                                                                                                                                                                                                                                                                                                                                                                                       CARDIAC TESTING:  Results for orders placed during the hospital encounter of 03/10/23    Echo    Interpretation Summary  · The left ventricle is normal in size with concentric remodeling and low normal systolic function.  · With low normal right ventricular systolic function.  · The estimated ejection fraction is 50%.  · Atrial fibrillation observed.  · Moderate-to-severe mitral regurgitation.  · Normal left ventricular diastolic function.  · Moderate to severe left atrial enlargement.    Consider MENDEL if patient is willing to have MV repair    No results found for this or any previous visit.     Results for orders placed in visit on 05/25/18    CATH LAB PROCEDURE       Patient Active Problem List   Diagnosis    Neuroendocrine carcinoma of small bowel    Nodule of left lung    Longstanding persistent atrial fibrillation    Hypertension    Hyperlipidemia LDL goal <70    Hypokalemia    Coronary artery disease involving native heart without angina pectoris    Second degree AV block    Cardiac arrest with ventricular fibrillation    Cardiac pacemaker in situ    History of deep vein thrombosis (DVT) of lower extremity    Current use of long term anticoagulation    Hypertensive urgency    Bilateral lower extremity edema    Atrial fibrillation with rapid ventricular response     Past Surgical History:   Procedure Laterality Date    A-V CARDIAC PACEMAKER INSERTION  Right     CARDIAC CATHETERIZATION      COLONOSCOPY W/ BIOPSIES      excision of colon       Social History     Socioeconomic History    Marital status:     Number of children: 9   Occupational History    Occupation: retired   Tobacco Use    Smoking status: Former    Smokeless tobacco: Never   Substance and Sexual Activity    Alcohol use: Not Currently    Drug use: Not Currently    Sexual activity: Not Currently     Partners: Female     Social Determinants of Health     Financial Resource Strain: Low Risk  (10/19/2022)    Overall Financial Resource Strain (CARDIA)     Difficulty of Paying Living Expenses: Not hard at all   Food Insecurity: No Food Insecurity (10/19/2022)    Hunger Vital Sign     Worried About Running Out of Food in the Last Year: Never true     Ran Out of Food in the Last Year: Never true   Transportation Needs: No Transportation Needs (10/19/2022)    PRAPARE - Transportation     Lack of Transportation (Medical): No     Lack of Transportation (Non-Medical): No   Physical Activity: Sufficiently Active (10/19/2022)    Exercise Vital Sign     Days of Exercise per Week: 6 days     Minutes of Exercise per Session: 60 min   Stress: No Stress Concern Present (10/19/2022)    [employer] [place] [employer] - Occupational Stress Questionnaire     Feeling of Stress : Not at all   Social Connections: Unknown (10/19/2022)    Social Connection and Isolation Panel [NHANES]     Frequency of Communication with Friends and Family: More than three times a week     Frequency of Social Gatherings with Friends and Family: More than three times a week     Attends Evangelical Services: More than 4 times per year     Active Member of Clubs or Organizations: No     Attends Club or Organization Meetings: Never   Housing Stability: Low Risk  (10/19/2022)    Housing Stability Vital Sign     Unable to Pay for Housing in the Last Year: No     Number of Places Lived in the Last Year: 1     Unstable Housing in the  "Last Year: No        Family History   Problem Relation Age of Onset    Hypertension Mother     Hypertension Father     Hypertension Sister      Review of patient's allergies indicates:  No Known Allergies      ROS:  Review of Systems   Constitutional:  Positive for malaise/fatigue.   HENT: Negative.     Eyes: Negative.    Respiratory: Negative.  Negative for shortness of breath.    Cardiovascular:  Negative for chest pain, palpitations, orthopnea, claudication, leg swelling and PND.   Gastrointestinal: Negative.    Genitourinary: Negative.    Musculoskeletal: Negative.    Skin: Negative.    Neurological: Negative.  Negative for weakness.   Endo/Heme/Allergies: Negative.    Psychiatric/Behavioral: Negative.                                                                                                                                                                                  Negative except as stated in the history of present illness. See HPI for details.    PHYSICAL EXAM:  Visit Vitals  BP (!) 122/91 (BP Location: Left arm, Patient Position: Sitting, BP Method: Medium (Automatic))   Pulse 60   Temp 97.5 °F (36.4 °C)   Resp 18   Ht 5' 9" (1.753 m)   Wt 109.5 kg (241 lb 6.5 oz)   SpO2 98%   BMI 35.65 kg/m²         Physical Exam  Constitutional:       Appearance: Normal appearance.   HENT:      Head: Normocephalic.   Eyes:      Pupils: Pupils are equal, round, and reactive to light.   Cardiovascular:      Rate and Rhythm: Normal rate and regular rhythm.      Pulses: Normal pulses.      Heart sounds: No murmur heard.  Pulmonary:      Effort: Pulmonary effort is normal. No respiratory distress.      Breath sounds: Normal breath sounds.   Abdominal:      General: There is no distension.   Musculoskeletal:         General: Normal range of motion.      Right lower leg: No edema.      Left lower leg: No edema.   Skin:     General: Skin is warm and dry.   Neurological:      General: No focal deficit present.      Mental " Status: He is alert and oriented to person, place, and time.   Psychiatric:         Mood and Affect: Mood normal.         Behavior: Behavior normal.         Current Outpatient Medications   Medication Instructions    albuterol (PROVENTIL/VENTOLIN HFA) 90 mcg/actuation inhaler 2 puffs, Inhalation, Every 6 hours PRN, Rescue    aspirin 81 MG Chew chew and swallow 1 tablet by mouth daily    atorvastatin (LIPITOR) 40 MG tablet 1 tablet, Oral, Daily    cetirizine (ZYRTEC) 10 mg, Oral, Daily    diltiaZEM (CARDIZEM CD) 360 mg, Oral, Daily    hyoscyamine (ANASPAZ,LEVSIN) 125 mcg, Oral, Every 6 hours PRN    losartan (COZAAR) 50 mg, Oral, Daily    metoprolol succinate (TOPROL-XL) 200 mg, Oral    omeprazole (PRILOSEC) 20 mg, Oral, Daily, One tab by mouth daily    potassium chloride (KLOR-CON) 20 mEq Pack 20 mEq, Oral    spironolactone (ALDACTONE) 50 mg, Oral, Daily    torsemide (DEMADEX) 10 mg, Oral, Daily    vitamin D (VITAMIN D3) 1,000 Units, Oral, Daily    XARELTO 20 mg Tab TAKE 1 TABLET BY MOUTH DAILY with SUPPER        All medications, laboratory studies, cardiac diagnostic imaging reviewed.     Lab Results   Component Value Date    LDL 31.00 (L) 09/18/2023    LDL 75.00 08/14/2023    TRIG 109 09/18/2023    TRIG 103 08/14/2023    CREATININE 1.06 10/14/2023    MG 1.70 10/14/2023    K 3.2 (L) 10/14/2023        ASSESSMENT/PLAN:  CAD, MI in 2003  - Denies any chest pain, SOB or palpitations today   - Continue atorvastatin 40, aspirin 81, and metoprolol succinate 200 mg BID  - METs 3.5    Persistent AFib  - Denies any palpitations, lightheadedness, dizziness, or syncopal episodes  - Device check in clinic today with 47 episodes of AF RVR, no changes made  - Continue Diltiazem 240 mg and Toprol 200 mg BID  - patient to follow up with EP in [place] for possible ablation/BiV upgrade on November 29th, 2023     2nd degree AVB s/p PPM-->upgrade to ICD for secondary prevention 2/2 VFib arrest  -Battery life 1.3-1.7 years  -Routine  check q3 months (next check 11.7.23)     Hypertension  - BP above goal today -did not take medications this morning  - Counseled on medication compliance  - Will have patient return to clinic in 2 weeks for a nurse visit/BP check  - Continue Losartan 50 mg qd, Toprol 200 mg BID, and aldactone 50 mg  - Counseled on low-sodium, heart healthy diet and exercise as tolerated     HLD  - LDL 31  - Continue atorvastatin 40  - Counseled on low-cholesterol, heart healthy diet and exercise as tolerated    Hypokalemia  - Had normal renin and aldosterone levels       Follow up in cardiology clinic in 3 months or sooner if needed   Follow up for device interrogation as directed   Follow up in [place] as directed   Follow up with PCP as directed

## 2023-11-07 ENCOUNTER — CLINICAL SUPPORT (OUTPATIENT)
Dept: CARDIOLOGY | Facility: CLINIC | Age: 68
End: 2023-11-07
Payer: MEDICARE

## 2023-11-07 ENCOUNTER — OFFICE VISIT (OUTPATIENT)
Dept: CARDIOLOGY | Facility: CLINIC | Age: 68
End: 2023-11-07
Payer: MEDICARE

## 2023-11-07 VITALS
WEIGHT: 241.38 LBS | RESPIRATION RATE: 18 BRPM | OXYGEN SATURATION: 98 % | DIASTOLIC BLOOD PRESSURE: 92 MMHG | SYSTOLIC BLOOD PRESSURE: 126 MMHG | HEIGHT: 69 IN | BODY MASS INDEX: 35.75 KG/M2 | HEART RATE: 60 BPM | TEMPERATURE: 98 F

## 2023-11-07 DIAGNOSIS — Z95.810 ICD (IMPLANTABLE CARDIOVERTER-DEFIBRILLATOR) IN PLACE: ICD-10-CM

## 2023-11-07 DIAGNOSIS — I46.9 CARDIAC ARREST WITH VENTRICULAR FIBRILLATION: ICD-10-CM

## 2023-11-07 DIAGNOSIS — I10 PRIMARY HYPERTENSION: ICD-10-CM

## 2023-11-07 DIAGNOSIS — Z95.0 CARDIAC PACEMAKER IN SITU: ICD-10-CM

## 2023-11-07 DIAGNOSIS — I44.1 SECOND DEGREE AV BLOCK: ICD-10-CM

## 2023-11-07 DIAGNOSIS — Z95.810 ICD (IMPLANTABLE CARDIOVERTER-DEFIBRILLATOR) IN PLACE: Primary | ICD-10-CM

## 2023-11-07 DIAGNOSIS — I25.10 CORONARY ARTERY DISEASE INVOLVING NATIVE HEART WITHOUT ANGINA PECTORIS, UNSPECIFIED VESSEL OR LESION TYPE: ICD-10-CM

## 2023-11-07 DIAGNOSIS — E78.00 HIGH CHOLESTEROL: ICD-10-CM

## 2023-11-07 DIAGNOSIS — I48.91 ATRIAL FIBRILLATION WITH RAPID VENTRICULAR RESPONSE: ICD-10-CM

## 2023-11-07 DIAGNOSIS — I48.11 LONGSTANDING PERSISTENT ATRIAL FIBRILLATION: Primary | ICD-10-CM

## 2023-11-07 DIAGNOSIS — I10 HYPERTENSION, UNSPECIFIED TYPE: ICD-10-CM

## 2023-11-07 DIAGNOSIS — I49.01 CARDIAC ARREST WITH VENTRICULAR FIBRILLATION: ICD-10-CM

## 2023-11-07 DIAGNOSIS — Z86.79 H/O VENTRICULAR FIBRILLATION: ICD-10-CM

## 2023-11-07 DIAGNOSIS — E78.5 HYPERLIPIDEMIA LDL GOAL <70: ICD-10-CM

## 2023-11-07 DIAGNOSIS — I16.0 HYPERTENSIVE URGENCY: ICD-10-CM

## 2023-11-07 PROCEDURE — 99211 OFF/OP EST MAY X REQ PHY/QHP: CPT | Mod: PBBFAC,27

## 2023-11-07 PROCEDURE — 99215 OFFICE O/P EST HI 40 MIN: CPT | Mod: PBBFAC

## 2023-11-07 PROCEDURE — 93283 PRGRMG EVAL IMPLANTABLE DFB: CPT | Mod: PBBFAC | Performed by: INTERNAL MEDICINE

## 2023-11-07 RX ORDER — POTASSIUM CHLORIDE 1.5 G/1.58G
20 POWDER, FOR SOLUTION ORAL
COMMUNITY
End: 2023-11-09 | Stop reason: SDUPTHER

## 2023-11-07 RX ORDER — SPIRONOLACTONE 50 MG/1
50 TABLET, FILM COATED ORAL DAILY
Qty: 90 TABLET | Refills: 1 | Status: ON HOLD | OUTPATIENT
Start: 2023-11-07 | End: 2024-01-12

## 2023-11-07 RX ORDER — LOSARTAN POTASSIUM 50 MG/1
50 TABLET ORAL DAILY
Qty: 90 TABLET | Refills: 1 | Status: ON HOLD | OUTPATIENT
Start: 2023-11-07 | End: 2024-01-12 | Stop reason: HOSPADM

## 2023-11-07 RX ORDER — ATORVASTATIN CALCIUM 40 MG/1
1 TABLET, FILM COATED ORAL DAILY
COMMUNITY
Start: 2023-05-02 | End: 2023-12-05 | Stop reason: SDUPTHER

## 2023-11-07 RX ORDER — DILTIAZEM HYDROCHLORIDE 360 MG/1
360 CAPSULE, EXTENDED RELEASE ORAL DAILY
Qty: 90 CAPSULE | Refills: 1 | Status: ON HOLD | OUTPATIENT
Start: 2023-11-07 | End: 2024-01-12 | Stop reason: HOSPADM

## 2023-11-07 RX ORDER — METOPROLOL SUCCINATE 200 MG/1
200 TABLET, EXTENDED RELEASE ORAL
COMMUNITY
Start: 2023-05-02 | End: 2023-12-05

## 2023-11-07 RX ORDER — METOPROLOL SUCCINATE 200 MG/1
200 TABLET, EXTENDED RELEASE ORAL 2 TIMES DAILY
Qty: 180 TABLET | Refills: 1 | Status: ON HOLD | OUTPATIENT
Start: 2023-11-07 | End: 2024-01-12

## 2023-11-07 RX ORDER — ATORVASTATIN CALCIUM 40 MG/1
40 TABLET, FILM COATED ORAL DAILY
Qty: 90 TABLET | Refills: 1 | Status: ON HOLD | OUTPATIENT
Start: 2023-11-07 | End: 2024-01-12 | Stop reason: HOSPADM

## 2023-11-07 NOTE — PATIENT INSTRUCTIONS
Follow up in cardiology clinic in 3 months or sooner if needed   Follow up for device interrogation as directed   Follow up in [place] as directed   Follow up with PCP as directed

## 2023-11-09 RX ORDER — POTASSIUM CHLORIDE 1.5 G/1.58G
20 POWDER, FOR SOLUTION ORAL DAILY
Qty: 30 PACKET | Refills: 5 | Status: SHIPPED | OUTPATIENT
Start: 2023-11-09 | End: 2023-12-15 | Stop reason: SDUPTHER

## 2023-11-15 DIAGNOSIS — I25.10 CORONARY ARTERY DISEASE INVOLVING NATIVE HEART WITHOUT ANGINA PECTORIS, UNSPECIFIED VESSEL OR LESION TYPE: ICD-10-CM

## 2023-11-15 RX ORDER — NAPROXEN SODIUM 220 MG/1
TABLET, FILM COATED ORAL
Qty: 90 TABLET | Refills: 3 | Status: ON HOLD | OUTPATIENT
Start: 2023-11-15 | End: 2024-01-12 | Stop reason: HOSPADM

## 2023-11-15 RX ORDER — CHOLECALCIFEROL (VITAMIN D3) 25 MCG
1000 TABLET ORAL DAILY
Qty: 30 TABLET | Refills: 1 | Status: ON HOLD | OUTPATIENT
Start: 2023-11-15 | End: 2024-01-12

## 2023-11-20 RX ORDER — OMEPRAZOLE 20 MG/1
20 CAPSULE, DELAYED RELEASE ORAL DAILY
Qty: 30 CAPSULE | Refills: 2 | Status: ON HOLD | OUTPATIENT
Start: 2023-11-20 | End: 2024-01-12

## 2023-12-04 RX ORDER — TORSEMIDE 10 MG/1
10 TABLET ORAL DAILY
Qty: 30 TABLET | Refills: 0 | Status: ON HOLD | OUTPATIENT
Start: 2023-12-04 | End: 2024-01-12

## 2023-12-05 ENCOUNTER — CLINICAL SUPPORT (OUTPATIENT)
Dept: CARDIOLOGY | Facility: CLINIC | Age: 68
End: 2023-12-05
Payer: MEDICARE

## 2023-12-05 VITALS
HEART RATE: 60 BPM | SYSTOLIC BLOOD PRESSURE: 124 MMHG | WEIGHT: 247.63 LBS | DIASTOLIC BLOOD PRESSURE: 84 MMHG | OXYGEN SATURATION: 98 % | TEMPERATURE: 98 F | BODY MASS INDEX: 35.45 KG/M2 | RESPIRATION RATE: 18 BRPM | HEIGHT: 70 IN

## 2023-12-05 DIAGNOSIS — I10 HYPERTENSION, UNSPECIFIED TYPE: Primary | ICD-10-CM

## 2023-12-05 PROCEDURE — 99213 OFFICE O/P EST LOW 20 MIN: CPT | Mod: PBBFAC

## 2023-12-05 PROCEDURE — 99211 PR OFFICE/OUTPT VISIT, EST, LEVL I: ICD-10-PCS | Mod: S$PBB,,,

## 2023-12-05 PROCEDURE — 99211 OFF/OP EST MAY X REQ PHY/QHP: CPT | Mod: S$PBB,,,

## 2023-12-05 NOTE — PROGRESS NOTES
Patient here today for a follow up BP and HR check. Patient reports compliance with medication regimen. Vitals charted and WNL. No new orders.

## 2023-12-11 ENCOUNTER — OFFICE VISIT (OUTPATIENT)
Dept: FAMILY MEDICINE | Facility: CLINIC | Age: 68
End: 2023-12-11
Payer: MEDICARE

## 2023-12-11 VITALS
BODY MASS INDEX: 35.57 KG/M2 | HEART RATE: 60 BPM | DIASTOLIC BLOOD PRESSURE: 87 MMHG | HEIGHT: 70 IN | WEIGHT: 248.44 LBS | RESPIRATION RATE: 20 BRPM | SYSTOLIC BLOOD PRESSURE: 136 MMHG | TEMPERATURE: 98 F | OXYGEN SATURATION: 99 %

## 2023-12-11 DIAGNOSIS — I48.91 ATRIAL FIBRILLATION, UNSPECIFIED TYPE: ICD-10-CM

## 2023-12-11 DIAGNOSIS — R60.0 BILATERAL LOWER EXTREMITY EDEMA: ICD-10-CM

## 2023-12-11 DIAGNOSIS — Z79.899 POLYPHARMACY: Primary | ICD-10-CM

## 2023-12-11 DIAGNOSIS — Z23 NEED FOR VACCINATION: ICD-10-CM

## 2023-12-11 DIAGNOSIS — I10 PRIMARY HYPERTENSION: ICD-10-CM

## 2023-12-11 PROBLEM — I21.9 MYOCARDIAL INFARCTION: Status: ACTIVE | Noted: 2023-12-11

## 2023-12-11 PROBLEM — I16.0 HYPERTENSIVE URGENCY: Status: RESOLVED | Noted: 2023-08-09 | Resolved: 2023-12-11

## 2023-12-11 PROBLEM — N40.0 BENIGN PROSTATIC HYPERPLASIA: Status: ACTIVE | Noted: 2023-12-11

## 2023-12-11 PROCEDURE — 99214 OFFICE O/P EST MOD 30 MIN: CPT | Mod: PBBFAC | Performed by: FAMILY MEDICINE

## 2023-12-11 NOTE — PATIENT INSTRUCTIONS
Markell Kebede,     If you are due for any health screening(s) below please notify me so we can arrange them to be ordered and scheduled. Most healthy patients at your age complete them, but you are free to accept or refuse.     If you can't do it, I'll definitely understand. If you can, I'd certainly appreciate it!    All of your core healthy metrics are met.

## 2023-12-11 NOTE — PROGRESS NOTES
CC: polypharmacy    HPI: 67 y/o AAM presents to geriatric clinic by himself as follow up for chronic issues, including polypharmacy. Seen 6 weeks ago to establish care with Geriatric clinic.     Denies any acute complaints today.   BP in office well controlled, 136/87. No headaches, vision changes, decreased urine. Did not take his meds this AM because he was rushing to get here.   Is scheduled for ablation for afib at [facility] 1/17/2024. Denies palpitations, chest pain, dyspnea, lightheadedness/dizziness.   Leg swelling much improved since last visit. Taking torsemide daily, at night.     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  PSH: bowel resection 2018, PM placement 2017 replaced with dual chamber ICD placement 2018  All: NKDA  Social hx: previous smoker, quit decades ago; prev drinker, quit; retired 6 years ago, prev worked at [employer] for 13 years  Meds: ASA 81mg, diltiazem 360mg daily, losartan 50mg daily, atorvastatin 40mg daily, xarelto 20mg daily, spironolactone 50mg daily, omeprazole 20mg daily, vit D 25mcg daily, metoprolol succinate 200mg BID, KlorCon 20meq BID, torsemide 10mg daily; previously on sucralfate 1g BID but has discontinued    MD Team:   Hem/Onc - [facility]  Cardiology - [facility],   EP Cards - [facility] Lu Camacho assessment:   Never drove, daughter drives him to places  Lives with daughter  Uses 4-prong cane since 2018, asking if he can get a new one because his current cane is worn out.   No recent falls  Great appetite, gained 7 lbs in past month  Bowel/bladder ok  Sleep ok  Good mood  No subjective memory issues   Independent of ADLs and most IADLs, states does not need assistance with finances  Takes medications by himself      ROS:   Review of Systems   Constitutional:  Negative  "for chills, fever, malaise/fatigue and weight loss.   Eyes:  Negative for blurred vision.   Respiratory:  Negative for cough and shortness of breath.    Cardiovascular:  Negative for chest pain and palpitations.   Gastrointestinal:  Negative for abdominal pain, constipation, nausea and vomiting.   Genitourinary:  Negative for dysuria.   Musculoskeletal:  Negative for falls.   Neurological:  Negative for dizziness, loss of consciousness and weakness.   Psychiatric/Behavioral:  Negative for depression.      P/E:    Vitals:    12/11/23 0919   BP: 136/87   BP Location: Left arm   Patient Position: Sitting   BP Method: Large (Automatic)   Pulse: 60   Resp: 20   Temp: 97.5 °F (36.4 °C)   TempSrc: Oral   SpO2: 99%   Weight: 112.7 kg (248 lb 7.3 oz)   Height: 5' 9.69" (1.77 m)     Did not take BP meds until he came to clinic  General: appears well, in no acute distress, walks with cane  Eye: no scleral icterus   Neck: supple, no lymphadenopathy, no carotid bruits   Respiratory: clear to auscultation bilaterally, nonlabored respirations   Cardiovascular: regular rate and rhythm without murmurs or gallops  Gastrointestinal: soft, non-tender, non-distended, bowel sounds present   Musculoskeletal: no gross deformities observed. trace edema in bilateral lower extremities, compression stockings on   Neuro-cog: good mood, clear thoughts    A/P: 67 y/o AAM with:    HTN  - cont diltiazem 360mg daily, losartan 50mg daily, spironolactone 50mg daily, metoprolol succinate 200mg BID  - went over all medications again today. Pt is on appropriate medications.   2. A-fib  - ablation scheduled for next month in [facility]  - rate controlled  - cont diltiazem 360mg daily, metoprolol succinate 200mg BID, xarelto 20mg daily  3. CAD/NICM  - cont atorvastatin 40mg daily and current medication regimen  4. H/o neuroendocrine tumor s/p resection  - cont to f/u with Hem-Onc  - surveillance scans 8/2024  5. Mild cognitive impairment  - will cont to " monitor  - independent of ADLs/IADLs  6. H/o Vit D def  - cont supplement  - repeat vit D next visit  7. Leg swelling- improved.  - continue torsemide 10, encouraged to take in AM   - continue compression stockings  - BMP next visit  - pt requesting Rx for cane as his is falling apart. Paper rx given.   8. HM  - flu shot 10/2023  - Tdap 5/2016  - Shingrix 12/2021, 3/2022  - PVX23 9/2019  - [id number] 9/2020  - needs [id number], due today  - colonoscopy 12/27/18: 3 mm hyperplastic sigmoid polyp removed; repeat in 5 years. Due. Discussed with patient; pt is willing but wants to wait until after ablation next month.     Broached LaPOST this visit. Pt tentatively wants CPR, but not intubation. Provided LaPOST to patient; he states he will discuss with family and decide.     Beth Daniel (dtr) [phone]  Ari Daniel (dtr) [phone]    Labs at next visit: Vit D, BMP  Order colonoscopy next visit  RTC 3 months after ablation.

## 2023-12-16 RX ORDER — POTASSIUM CHLORIDE 1.5 G/1.58G
20 POWDER, FOR SOLUTION ORAL DAILY
Qty: 30 PACKET | Refills: 5 | Status: ON HOLD | OUTPATIENT
Start: 2023-12-16 | End: 2024-01-12

## 2023-12-19 ENCOUNTER — HOSPITAL ENCOUNTER (INPATIENT)
Facility: HOSPITAL | Age: 68
LOS: 28 days | Discharge: NURSING, REHAB OR LONG-TERM CARE | DRG: 003 | End: 2024-01-16
Attending: STUDENT IN AN ORGANIZED HEALTH CARE EDUCATION/TRAINING PROGRAM | Admitting: PSYCHIATRY & NEUROLOGY
Payer: MEDICARE

## 2023-12-19 ENCOUNTER — ANESTHESIA (OUTPATIENT)
Dept: INTERVENTIONAL RADIOLOGY/VASCULAR | Facility: HOSPITAL | Age: 68
DRG: 003 | End: 2023-12-19
Payer: MEDICARE

## 2023-12-19 DIAGNOSIS — I63.9 CEREBROVASCULAR ACCIDENT (CVA), UNSPECIFIED MECHANISM: ICD-10-CM

## 2023-12-19 DIAGNOSIS — R29.818 ACUTE FOCAL NEUROLOGICAL DEFICIT: ICD-10-CM

## 2023-12-19 DIAGNOSIS — R20.9 COLD EXTREMITIES: ICD-10-CM

## 2023-12-19 DIAGNOSIS — I63.9 STROKE: ICD-10-CM

## 2023-12-19 DIAGNOSIS — I63.311 CEREBROVASCULAR ACCIDENT (CVA) DUE TO THROMBOSIS OF RIGHT MIDDLE CEREBRAL ARTERY: Primary | ICD-10-CM

## 2023-12-19 DIAGNOSIS — R78.81 BACTEREMIA DUE TO GRAM-POSITIVE BACTERIA: ICD-10-CM

## 2023-12-19 LAB
ALBUMIN SERPL-MCNC: 4.3 G/DL (ref 3.4–4.8)
ALBUMIN/GLOB SERPL: 1.1 RATIO (ref 1.1–2)
ALLENS TEST BLOOD GAS (OHS): ABNORMAL
ALP SERPL-CCNC: 66 UNIT/L (ref 40–150)
ALT SERPL-CCNC: 25 UNIT/L (ref 0–55)
ANION GAP SERPL CALC-SCNC: 11 MEQ/L
ANION GAP SERPL CALC-SCNC: 17 MMOL/L (ref 8–16)
AST SERPL-CCNC: 33 UNIT/L (ref 5–34)
AV INDEX (PROSTH): 0.66
AV MEAN GRADIENT: 3 MMHG
AV PEAK GRADIENT: 5 MMHG
AV VALVE AREA BY VELOCITY RATIO: 2.27 CM²
AV VALVE AREA: 2.73 CM²
AV VELOCITY RATIO: 0.55
BASE EXCESS BLD CALC-SCNC: -0.06 MMOL/L (ref -2–2)
BASOPHILS # BLD AUTO: 0.05 X10(3)/MCL
BASOPHILS NFR BLD AUTO: 0.5 %
BILIRUB SERPL-MCNC: 2.1 MG/DL
BLOOD GAS SAMPLE TYPE (OHS): ABNORMAL
BSA FOR ECHO PROCEDURE: 2.35 M2
BUN SERPL-MCNC: 12 MG/DL (ref 8.4–25.7)
BUN SERPL-MCNC: 13.5 MG/DL (ref 8.4–25.7)
BUN SERPL-MCNC: 15 MG/DL (ref 6–30)
CA-I BLD-SCNC: 1.08 MMOL/L (ref 1.12–1.23)
CALCIUM SERPL-MCNC: 8.4 MG/DL (ref 8.8–10)
CALCIUM SERPL-MCNC: 9.1 MG/DL (ref 8.8–10)
CHLORIDE SERPL-SCNC: 100 MMOL/L (ref 95–110)
CHLORIDE SERPL-SCNC: 106 MMOL/L (ref 98–107)
CHLORIDE SERPL-SCNC: 109 MMOL/L (ref 98–107)
CHOLEST SERPL-MCNC: 125 MG/DL
CHOLEST/HDLC SERPL: 4 {RATIO} (ref 0–5)
CO2 SERPL-SCNC: 20 MMOL/L (ref 23–31)
CO2 SERPL-SCNC: 25 MMOL/L (ref 23–31)
CREAT SERPL-MCNC: 0.99 MG/DL (ref 0.73–1.18)
CREAT SERPL-MCNC: 1.3 MG/DL (ref 0.5–1.4)
CREAT SERPL-MCNC: 1.32 MG/DL (ref 0.73–1.18)
CREAT/UREA NIT SERPL: 12
CV ECHO LV RWT: 0.55 CM
DOP CALC AO PEAK VEL: 1.15 M/S
DOP CALC AO VTI: 19.6 CM
DOP CALC LVOT AREA: 4.2 CM2
DOP CALC LVOT DIAMETER: 2.3 CM
DOP CALC LVOT PEAK VEL: 0.63 M/S
DOP CALC LVOT STROKE VOLUME: 53.57 CM3
DOP CALC MV VTI: 17.6 CM
DOP CALCLVOT PEAK VEL VTI: 12.9 CM
DRAWN BY BLOOD GAS (OHS): ABNORMAL
ECHO LV POSTERIOR WALL: 1.3 CM (ref 0.6–1.1)
EOSINOPHIL # BLD AUTO: 0.01 X10(3)/MCL (ref 0–0.9)
EOSINOPHIL NFR BLD AUTO: 0.1 %
ERYTHROCYTE [DISTWIDTH] IN BLOOD BY AUTOMATED COUNT: 14.4 % (ref 11.5–17)
FRACTIONAL SHORTENING: 39 % (ref 28–44)
GFR SERPLBLD CREATININE-BSD FMLA CKD-EPI: 59 MLS/MIN/1.73/M2
GFR SERPLBLD CREATININE-BSD FMLA CKD-EPI: >60 MLS/MIN/1.73/M2
GLOBULIN SER-MCNC: 3.8 GM/DL (ref 2.4–3.5)
GLUCOSE SERPL-MCNC: 137 MG/DL (ref 82–115)
GLUCOSE SERPL-MCNC: 140 MG/DL (ref 70–110)
GLUCOSE SERPL-MCNC: 166 MG/DL (ref 82–115)
HCO3 BLDA-SCNC: 25.4 MMOL/L (ref 22–26)
HCT VFR BLD AUTO: 48.4 % (ref 42–52)
HCT VFR BLD CALC: 51 %PCV (ref 36–54)
HDLC SERPL-MCNC: 30 MG/DL (ref 35–60)
HGB BLD-MCNC: 16 G/DL (ref 14–18)
HGB BLD-MCNC: 17 G/DL
IMM GRANULOCYTES # BLD AUTO: 0.05 X10(3)/MCL (ref 0–0.04)
IMM GRANULOCYTES NFR BLD AUTO: 0.5 %
INR PPP: 1.4
INTERVENTRICULAR SEPTUM: 1.52 CM (ref 0.6–1.1)
LDLC SERPL CALC-MCNC: 70 MG/DL (ref 50–140)
LEFT ATRIUM SIZE: 4.7 CM
LEFT ATRIUM VOLUME INDEX MOD: 38.8 ML/M2
LEFT ATRIUM VOLUME MOD: 88.9 CM3
LEFT CCA DIST DIAS: 8 CM/S
LEFT CCA DIST SYS: 22 CM/S
LEFT CCA PROX DIAS: 11 CM/S
LEFT CCA PROX SYS: 37 CM/S
LEFT ECA SYS: 36 CM/S
LEFT ICA DIST DIAS: 9 CM/S
LEFT ICA DIST SYS: 33 CM/S
LEFT ICA MID DIAS: 10 CM/S
LEFT ICA MID SYS: 28 CM/S
LEFT ICA PROX DIAS: 7 CM/S
LEFT ICA PROX SYS: 18 CM/S
LEFT INTERNAL DIMENSION IN SYSTOLE: 2.86 CM (ref 2.1–4)
LEFT VENTRICLE DIASTOLIC VOLUME INDEX: 44.54 ML/M2
LEFT VENTRICLE DIASTOLIC VOLUME: 102 ML
LEFT VENTRICLE MASS INDEX: 117 G/M2
LEFT VENTRICLE SYSTOLIC VOLUME INDEX: 13.6 ML/M2
LEFT VENTRICLE SYSTOLIC VOLUME: 31.1 ML
LEFT VENTRICULAR INTERNAL DIMENSION IN DIASTOLE: 4.69 CM (ref 3.5–6)
LEFT VENTRICULAR MASS: 267.18 G
LEFT VERTEBRAL SYS: 19 CM/S
LVOT MG: 1 MMHG
LVOT MV: 0.42 CM/S
LYMPHOCYTES # BLD AUTO: 2.05 X10(3)/MCL (ref 0.6–4.6)
LYMPHOCYTES NFR BLD AUTO: 22.5 %
MAGNESIUM SERPL-MCNC: 1.9 MG/DL (ref 1.6–2.6)
MCH RBC QN AUTO: 29.6 PG (ref 27–31)
MCHC RBC AUTO-ENTMCNC: 33.1 G/DL (ref 33–36)
MCV RBC AUTO: 89.5 FL (ref 80–94)
MONOCYTES # BLD AUTO: 0.54 X10(3)/MCL (ref 0.1–1.3)
MONOCYTES NFR BLD AUTO: 5.9 %
MV MEAN GRADIENT: 3 MMHG
MV PEAK E VEL: 0.96 M/S
MV PEAK GRADIENT: 5 MMHG
MV VALVE AREA BY CONTINUITY EQUATION: 3.04 CM2
NEUTROPHILS # BLD AUTO: 6.4 X10(3)/MCL (ref 2.1–9.2)
NEUTROPHILS NFR BLD AUTO: 70.5 %
NRBC BLD AUTO-RTO: 0 %
OHS CV CAROTID RIGHT ICA EDV HIGHEST: 15
OHS CV CAROTID ULTRASOUND LEFT ICA/CCA RATIO: 1.5
OHS CV CAROTID ULTRASOUND RIGHT ICA/CCA RATIO: 1.41
OHS CV PV CAROTID LEFT HIGHEST CCA: 37
OHS CV PV CAROTID LEFT HIGHEST ICA: 33
OHS CV PV CAROTID RIGHT HIGHEST CCA: 22
OHS CV PV CAROTID RIGHT HIGHEST ICA: 31
OHS CV US CAROTID LEFT HIGHEST EDV: 10
OHS LV EJECTION FRACTION SIMPSONS BIPLANE MOD: 50 %
PCO2 BLDA: 46 MMHG (ref 35–45)
PH BLDA: 7.35 [PH] (ref 7.35–7.45)
PHOSPHATE SERPL-MCNC: 3.1 MG/DL (ref 2.3–4.7)
PLATELET # BLD AUTO: 228 X10(3)/MCL (ref 130–400)
PMV BLD AUTO: 9.9 FL (ref 7.4–10.4)
PO2 BLDA: 76 MMHG (ref 80–100)
POC IONIZED CALCIUM: 1.17 MMOL/L (ref 1.06–1.42)
POC TCO2 (MEASURED): 29 MMOL/L (ref 23–29)
POTASSIUM BLD-SCNC: 3.8 MMOL/L (ref 3.5–5.1)
POTASSIUM BLOOD GAS (OHS): 3.9 MMOL/L (ref 3.5–5)
POTASSIUM SERPL-SCNC: 3.7 MMOL/L (ref 3.5–5.1)
POTASSIUM SERPL-SCNC: 4.4 MMOL/L (ref 3.5–5.1)
PROT SERPL-MCNC: 8.1 GM/DL (ref 5.8–7.6)
PROTHROMBIN TIME: 16.9 SECONDS (ref 12.5–14.5)
RA WIDTH: 4.52 CM
RBC # BLD AUTO: 5.41 X10(6)/MCL (ref 4.7–6.1)
RIGHT CCA DIST DIAS: 9 CM/S
RIGHT CCA DIST SYS: 22 CM/S
RIGHT CCA PROX DIAS: 5 CM/S
RIGHT CCA PROX SYS: 21 CM/S
RIGHT ECA DIAS: 0 CM/S
RIGHT ECA SYS: 31 CM/S
RIGHT ICA DIST DIAS: 15 CM/S
RIGHT ICA DIST SYS: 31 CM/S
RIGHT ICA MID DIAS: 11 CM/S
RIGHT ICA MID SYS: 25 CM/S
RIGHT ICA PROX DIAS: 4 CM/S
RIGHT ICA PROX SYS: 19 CM/S
RIGHT VENTRICULAR END-DIASTOLIC DIMENSION: 3.24 CM
RIGHT VERTEBRAL SYS: 12 CM/S
SAMPLE SITE BLOOD GAS (OHS): ABNORMAL
SAMPLE: ABNORMAL
SAO2 % BLDA: 94 %
SODIUM BLD-SCNC: 142 MMOL/L (ref 136–145)
SODIUM BLOOD GAS (OHS): 138 MMOL/L (ref 137–145)
SODIUM SERPL-SCNC: 140 MMOL/L (ref 136–145)
SODIUM SERPL-SCNC: 140 MMOL/L (ref 136–145)
TRICUSPID ANNULAR PLANE SYSTOLIC EXCURSION: 1.56 CM
TRIGL SERPL-MCNC: 124 MG/DL (ref 34–140)
TSH SERPL-ACNC: 9.89 UIU/ML (ref 0.35–4.94)
VLDLC SERPL CALC-MCNC: 25 MG/DL
WBC # SPEC AUTO: 9.1 X10(3)/MCL (ref 4.5–11.5)
Z-SCORE OF LEFT VENTRICULAR DIMENSION IN END DIASTOLE: -6.23
Z-SCORE OF LEFT VENTRICULAR DIMENSION IN END SYSTOLE: -4.85

## 2023-12-19 PROCEDURE — 82803 BLOOD GASES ANY COMBINATION: CPT

## 2023-12-19 PROCEDURE — 0BH17EZ INSERTION OF ENDOTRACHEAL AIRWAY INTO TRACHEA, VIA NATURAL OR ARTIFICIAL OPENING: ICD-10-PCS | Performed by: INTERNAL MEDICINE

## 2023-12-19 PROCEDURE — 84443 ASSAY THYROID STIM HORMONE: CPT | Performed by: PHYSICIAN ASSISTANT

## 2023-12-19 PROCEDURE — D9220A PRA ANESTHESIA: ICD-10-PCS | Mod: CRNA,,, | Performed by: NURSE ANESTHETIST, CERTIFIED REGISTERED

## 2023-12-19 PROCEDURE — 83735 ASSAY OF MAGNESIUM: CPT

## 2023-12-19 PROCEDURE — D9220A PRA ANESTHESIA: Mod: ANES,,, | Performed by: ANESTHESIOLOGY

## 2023-12-19 PROCEDURE — D9220A PRA ANESTHESIA: ICD-10-PCS | Mod: ANES,,, | Performed by: ANESTHESIOLOGY

## 2023-12-19 PROCEDURE — 61645 PERQ ART M-THROMBECT &/NFS: CPT | Mod: RT,,, | Performed by: PSYCHIATRY & NEUROLOGY

## 2023-12-19 PROCEDURE — 5A1955Z RESPIRATORY VENTILATION, GREATER THAN 96 CONSECUTIVE HOURS: ICD-10-PCS | Performed by: INTERNAL MEDICINE

## 2023-12-19 PROCEDURE — 27100171 HC OXYGEN HIGH FLOW UP TO 24 HOURS

## 2023-12-19 PROCEDURE — 85610 PROTHROMBIN TIME: CPT | Performed by: PHYSICIAN ASSISTANT

## 2023-12-19 PROCEDURE — 63600175 PHARM REV CODE 636 W HCPCS: Performed by: STUDENT IN AN ORGANIZED HEALTH CARE EDUCATION/TRAINING PROGRAM

## 2023-12-19 PROCEDURE — 25000003 PHARM REV CODE 250

## 2023-12-19 PROCEDURE — 94660 CPAP INITIATION&MGMT: CPT

## 2023-12-19 PROCEDURE — 37799 UNLISTED PX VASCULAR SURGERY: CPT

## 2023-12-19 PROCEDURE — 93005 ELECTROCARDIOGRAM TRACING: CPT

## 2023-12-19 PROCEDURE — 84100 ASSAY OF PHOSPHORUS: CPT

## 2023-12-19 PROCEDURE — 20000000 HC ICU ROOM

## 2023-12-19 PROCEDURE — 63600175 PHARM REV CODE 636 W HCPCS: Performed by: NURSE ANESTHETIST, CERTIFIED REGISTERED

## 2023-12-19 PROCEDURE — 80061 LIPID PANEL: CPT | Performed by: PHYSICIAN ASSISTANT

## 2023-12-19 PROCEDURE — 27000190 HC CPAP FULL FACE MASK W/VALVE

## 2023-12-19 PROCEDURE — 99900031 HC PATIENT EDUCATION (STAT)

## 2023-12-19 PROCEDURE — 99900035 HC TECH TIME PER 15 MIN (STAT)

## 2023-12-19 PROCEDURE — 03CG3ZZ EXTIRPATION OF MATTER FROM INTRACRANIAL ARTERY, PERCUTANEOUS APPROACH: ICD-10-PCS | Performed by: PSYCHIATRY & NEUROLOGY

## 2023-12-19 PROCEDURE — 25500020 PHARM REV CODE 255: Performed by: STUDENT IN AN ORGANIZED HEALTH CARE EDUCATION/TRAINING PROGRAM

## 2023-12-19 PROCEDURE — 85025 COMPLETE CBC W/AUTO DIFF WBC: CPT | Performed by: PHYSICIAN ASSISTANT

## 2023-12-19 PROCEDURE — 80047 BASIC METABLC PNL IONIZED CA: CPT

## 2023-12-19 PROCEDURE — 25000003 PHARM REV CODE 250: Performed by: INTERNAL MEDICINE

## 2023-12-19 PROCEDURE — 80053 COMPREHEN METABOLIC PANEL: CPT | Performed by: PHYSICIAN ASSISTANT

## 2023-12-19 PROCEDURE — 25000003 PHARM REV CODE 250: Performed by: NURSE ANESTHETIST, CERTIFIED REGISTERED

## 2023-12-19 PROCEDURE — 99285 EMERGENCY DEPT VISIT HI MDM: CPT | Mod: 25

## 2023-12-19 PROCEDURE — D9220A PRA ANESTHESIA: Mod: CRNA,,, | Performed by: NURSE ANESTHETIST, CERTIFIED REGISTERED

## 2023-12-19 PROCEDURE — 25500020 PHARM REV CODE 255: Performed by: PSYCHIATRY & NEUROLOGY

## 2023-12-19 PROCEDURE — 94761 N-INVAS EAR/PLS OXIMETRY MLT: CPT | Mod: XB

## 2023-12-19 RX ORDER — DEXAMETHASONE SODIUM PHOSPHATE 4 MG/ML
INJECTION, SOLUTION INTRA-ARTICULAR; INTRALESIONAL; INTRAMUSCULAR; INTRAVENOUS; SOFT TISSUE
Status: DISCONTINUED | OUTPATIENT
Start: 2023-12-19 | End: 2023-12-19

## 2023-12-19 RX ORDER — METOPROLOL TARTRATE 1 MG/ML
5 INJECTION, SOLUTION INTRAVENOUS EVERY 5 MIN PRN
Status: DISCONTINUED | OUTPATIENT
Start: 2023-12-19 | End: 2024-01-16 | Stop reason: HOSPADM

## 2023-12-19 RX ORDER — HYDRALAZINE HYDROCHLORIDE 20 MG/ML
10 INJECTION INTRAMUSCULAR; INTRAVENOUS EVERY 4 HOURS PRN
Status: DISCONTINUED | OUTPATIENT
Start: 2023-12-19 | End: 2023-12-20

## 2023-12-19 RX ORDER — LIDOCAINE HYDROCHLORIDE 10 MG/ML
1 INJECTION, SOLUTION EPIDURAL; INFILTRATION; INTRACAUDAL; PERINEURAL ONCE
Status: CANCELLED | OUTPATIENT
Start: 2023-12-19 | End: 2023-12-19

## 2023-12-19 RX ORDER — MUPIROCIN 20 MG/G
OINTMENT TOPICAL 2 TIMES DAILY
Status: COMPLETED | OUTPATIENT
Start: 2023-12-19 | End: 2023-12-24

## 2023-12-19 RX ORDER — SODIUM CHLORIDE 9 MG/ML
INJECTION, SOLUTION INTRAVENOUS CONTINUOUS
Status: DISCONTINUED | OUTPATIENT
Start: 2023-12-19 | End: 2023-12-31

## 2023-12-19 RX ORDER — ROCURONIUM BROMIDE 10 MG/ML
INJECTION, SOLUTION INTRAVENOUS
Status: DISCONTINUED | OUTPATIENT
Start: 2023-12-19 | End: 2023-12-19

## 2023-12-19 RX ORDER — LABETALOL HYDROCHLORIDE 5 MG/ML
10 INJECTION, SOLUTION INTRAVENOUS EVERY 4 HOURS PRN
Status: DISCONTINUED | OUTPATIENT
Start: 2023-12-19 | End: 2023-12-19

## 2023-12-19 RX ORDER — ONDANSETRON 2 MG/ML
INJECTION INTRAMUSCULAR; INTRAVENOUS
Status: DISCONTINUED | OUTPATIENT
Start: 2023-12-19 | End: 2023-12-19

## 2023-12-19 RX ORDER — LIDOCAINE HYDROCHLORIDE 20 MG/ML
INJECTION INTRAVENOUS
Status: DISCONTINUED | OUTPATIENT
Start: 2023-12-19 | End: 2023-12-19

## 2023-12-19 RX ORDER — ASPIRIN 300 MG/1
300 SUPPOSITORY RECTAL DAILY
Status: DISCONTINUED | OUTPATIENT
Start: 2023-12-20 | End: 2023-12-23

## 2023-12-19 RX ORDER — CEFAZOLIN SODIUM 1 G/3ML
INJECTION, POWDER, FOR SOLUTION INTRAMUSCULAR; INTRAVENOUS
Status: DISCONTINUED | OUTPATIENT
Start: 2023-12-19 | End: 2023-12-19

## 2023-12-19 RX ORDER — HYDRALAZINE HYDROCHLORIDE 20 MG/ML
10 INJECTION INTRAMUSCULAR; INTRAVENOUS EVERY 4 HOURS PRN
Status: DISCONTINUED | OUTPATIENT
Start: 2023-12-19 | End: 2023-12-19

## 2023-12-19 RX ORDER — FENTANYL CITRATE 50 UG/ML
INJECTION, SOLUTION INTRAMUSCULAR; INTRAVENOUS
Status: DISCONTINUED | OUTPATIENT
Start: 2023-12-19 | End: 2023-12-19

## 2023-12-19 RX ORDER — PROPOFOL 10 MG/ML
VIAL (ML) INTRAVENOUS
Status: DISCONTINUED | OUTPATIENT
Start: 2023-12-19 | End: 2023-12-19

## 2023-12-19 RX ORDER — ACETAMINOPHEN 10 MG/ML
INJECTION, SOLUTION INTRAVENOUS
Status: DISCONTINUED | OUTPATIENT
Start: 2023-12-19 | End: 2023-12-19

## 2023-12-19 RX ORDER — LABETALOL HYDROCHLORIDE 5 MG/ML
10 INJECTION, SOLUTION INTRAVENOUS EVERY 4 HOURS PRN
Status: DISCONTINUED | OUTPATIENT
Start: 2023-12-19 | End: 2023-12-20

## 2023-12-19 RX ORDER — SUCCINYLCHOLINE CHLORIDE 20 MG/ML
INJECTION INTRAMUSCULAR; INTRAVENOUS
Status: DISCONTINUED | OUTPATIENT
Start: 2023-12-19 | End: 2023-12-19

## 2023-12-19 RX ORDER — SODIUM CHLORIDE 0.9 % (FLUSH) 0.9 %
10 SYRINGE (ML) INJECTION
Status: CANCELLED | OUTPATIENT
Start: 2023-12-19

## 2023-12-19 RX ORDER — SODIUM CHLORIDE 0.9 % (FLUSH) 0.9 %
10 SYRINGE (ML) INJECTION
Status: DISCONTINUED | OUTPATIENT
Start: 2023-12-19 | End: 2024-01-16 | Stop reason: HOSPADM

## 2023-12-19 RX ORDER — PHENYLEPHRINE HYDROCHLORIDE 10 MG/ML
INJECTION INTRAVENOUS
Status: DISCONTINUED | OUTPATIENT
Start: 2023-12-19 | End: 2023-12-19

## 2023-12-19 RX ADMIN — SODIUM CHLORIDE, SODIUM GLUCONATE, SODIUM ACETATE, POTASSIUM CHLORIDE AND MAGNESIUM CHLORIDE: 526; 502; 368; 37; 30 INJECTION, SOLUTION INTRAVENOUS at 01:12

## 2023-12-19 RX ADMIN — ROCURONIUM BROMIDE 50 MG: 10 SOLUTION INTRAVENOUS at 01:12

## 2023-12-19 RX ADMIN — IOPAMIDOL 50 ML: 755 INJECTION, SOLUTION INTRAVENOUS at 11:12

## 2023-12-19 RX ADMIN — ONDANSETRON 4 MG: 2 INJECTION INTRAMUSCULAR; INTRAVENOUS at 02:12

## 2023-12-19 RX ADMIN — METOROPROLOL TARTRATE 5 MG: 5 INJECTION, SOLUTION INTRAVENOUS at 10:12

## 2023-12-19 RX ADMIN — SUCCINYLCHOLINE CHLORIDE 140 MG: 20 INJECTION, SOLUTION INTRAMUSCULAR; INTRAVENOUS at 01:12

## 2023-12-19 RX ADMIN — PHENYLEPHRINE HYDROCHLORIDE 25 MCG/MIN: 10 INJECTION INTRAVENOUS at 02:12

## 2023-12-19 RX ADMIN — PROPOFOL 180 MG: 10 INJECTION, EMULSION INTRAVENOUS at 01:12

## 2023-12-19 RX ADMIN — IOPAMIDOL 150 ML: 755 INJECTION, SOLUTION INTRAVENOUS at 02:12

## 2023-12-19 RX ADMIN — PHENYLEPHRINE HYDROCHLORIDE 100 MCG: 10 INJECTION INTRAVENOUS at 02:12

## 2023-12-19 RX ADMIN — CEFAZOLIN 2 G: 330 INJECTION, POWDER, FOR SOLUTION INTRAMUSCULAR; INTRAVENOUS at 01:12

## 2023-12-19 RX ADMIN — SUGAMMADEX 200 MG: 100 INJECTION, SOLUTION INTRAVENOUS at 02:12

## 2023-12-19 RX ADMIN — METOROPROLOL TARTRATE 5 MG: 5 INJECTION, SOLUTION INTRAVENOUS at 11:12

## 2023-12-19 RX ADMIN — DEXAMETHASONE SODIUM PHOSPHATE 4 MG: 4 INJECTION, SOLUTION INTRA-ARTICULAR; INTRALESIONAL; INTRAMUSCULAR; INTRAVENOUS; SOFT TISSUE at 01:12

## 2023-12-19 RX ADMIN — FENTANYL CITRATE 50 MCG: 50 INJECTION, SOLUTION INTRAMUSCULAR; INTRAVENOUS at 01:12

## 2023-12-19 RX ADMIN — MUPIROCIN: 20 OINTMENT TOPICAL at 10:12

## 2023-12-19 RX ADMIN — HYDRALAZINE HYDROCHLORIDE 10 MG: 20 INJECTION INTRAMUSCULAR; INTRAVENOUS at 09:12

## 2023-12-19 RX ADMIN — ACETAMINOPHEN 1000 MG: 10 INJECTION, SOLUTION INTRAVENOUS at 02:12

## 2023-12-19 RX ADMIN — LIDOCAINE HYDROCHLORIDE 80 MG: 20 INJECTION INTRAVENOUS at 01:12

## 2023-12-19 RX ADMIN — LABETALOL HYDROCHLORIDE 10 MG: 5 INJECTION, SOLUTION INTRAVENOUS at 06:12

## 2023-12-19 RX ADMIN — SODIUM CHLORIDE: 9 INJECTION, SOLUTION INTRAVENOUS at 06:12

## 2023-12-19 NOTE — SUBJECTIVE & OBJECTIVE
Past Medical History:   Diagnosis Date    Arthritis     Atrial fibrillation     BPH (benign prostatic hyperplasia)     Cardiac arrest     Coronary artery disease     Cyst, kidney, acquired     Diverticulosis     Hyperlipidemia     Hypertension     MI (myocardial infarction)     Obesity     Steatosis of liver        Past Surgical History:   Procedure Laterality Date    A-V CARDIAC PACEMAKER INSERTION Right     CARDIAC CATHETERIZATION      COLONOSCOPY W/ BIOPSIES      excision of colon         Review of patient's allergies indicates:  No Known Allergies    Current Neurological Medications:     No current facility-administered medications on file prior to encounter.     Current Outpatient Medications on File Prior to Encounter   Medication Sig    albuterol (PROVENTIL/VENTOLIN HFA) 90 mcg/actuation inhaler Inhale 2 puffs into the lungs every 6 (six) hours as needed for Wheezing. Rescue (Patient not taking: Reported on 8/22/2023)    aspirin 81 MG Chew chew and swallow 1 tablet by mouth daily    atorvastatin (LIPITOR) 40 MG tablet Take 1 tablet (40 mg total) by mouth once daily.    cetirizine (ZYRTEC) 10 MG tablet Take 1 tablet (10 mg total) by mouth once daily. for 14 days    diltiaZEM (CARDIZEM CD) 360 MG 24 hr capsule Take 1 capsule (360 mg total) by mouth once daily.    losartan (COZAAR) 50 MG tablet Take 1 tablet (50 mg total) by mouth once daily.    metoprolol succinate (TOPROL-XL) 200 MG 24 hr tablet Take 1 tablet (200 mg total) by mouth 2 (two) times daily.    omeprazole (PRILOSEC) 20 MG capsule Take 1 capsule (20 mg total) by mouth once daily. One tab by mouth daily    potassium chloride (KLOR-CON) 20 mEq Pack Take 20 mEq by mouth once daily.    spironolactone (ALDACTONE) 50 MG tablet Take 1 tablet (50 mg total) by mouth once daily.    torsemide (DEMADEX) 10 MG Tab Take 1 tablet (10 mg total) by mouth once daily.    vitamin D (VITAMIN D3) 1000 units Tab Take 1 tablet (1,000 Units total) by mouth once daily.     "XARELTO 20 mg Tab TAKE 1 TABLET BY MOUTH DAILY with SUPPER     Family History       Problem Relation (Age of Onset)    Hypertension Mother, Father, Sister          Tobacco Use    Smoking status: Former    Smokeless tobacco: Never   Substance and Sexual Activity    Alcohol use: Not Currently    Drug use: Not Currently    Sexual activity: Not Currently     Partners: Female     Review of Systems   Unable to perform ROS: Acuity of condition     Objective:     Vital Signs (Most Recent):  Pulse: 98 (12/19/23 1041)  Resp: 20 (12/19/23 1041)  BP: (!) 162/94 (12/19/23 1041)  SpO2: 98 % (12/19/23 1041) Vital Signs (24h Range):  Pulse:  [98] 98  Resp:  [20] 20  SpO2:  [98 %] 98 %  BP: (162)/(94) 162/94     Weight: 110 kg (242 lb 8.1 oz)  Body mass index is 35.11 kg/m².     Physical Exam  Constitutional:       Appearance: He is obese.   HENT:      Head: Normocephalic and atraumatic.   Eyes:      Comments: Right gaze deviation   Pulmonary:      Effort: Pulmonary effort is normal.   Abdominal:      Palpations: Abdomen is soft.   Musculoskeletal:         General: Normal range of motion.      Cervical back: Normal range of motion and neck supple.   Skin:     General: Skin is warm and dry.      Capillary Refill: Capillary refill takes less than 2 seconds.   Neurological:      Mental Status: He is alert and oriented to person, place, and time.      Comments: Follows commands.  Severe Dysarthria  LUE/LLE hemiplegia  Complete sensory loss of left side  Left hemianopia     Psychiatric:         Mood and Affect: Mood normal.         Behavior: Behavior normal.          NEUROLOGICAL EXAMINATION:     MENTAL STATUS   Oriented to person, place, and time.       Significant Labs: CBC: No results for input(s): "WBC", "HGB", "HCT", "PLT" in the last 48 hours.  CMP: No results for input(s): "GLU", "NA", "K", "CL", "CO2", "BUN", "CREATININE", "CALCIUM", "MG", "PROT", "ALBUMIN", "BILITOT", "ALKPHOS", "AST", "ALT", "ANIONGAP", "EGFRNONAA" in the last " 48 hours.    Significant Imaging: I have reviewed all pertinent imaging results/findings within the past 24 hours.

## 2023-12-19 NOTE — BRIEF OP NOTE
BRAD thrombectomy successful.  The patient tolerated the procedure well, without apparent complication.  Full, dictated report to follow.

## 2023-12-19 NOTE — ANESTHESIA PROCEDURE NOTES
Intubation    Date/Time: 12/19/2023 1:31 PM    Performed by: Dabadie, Virginia G, CRNA  Authorized by: Pedro Remy DO    Intubation:     Induction:  Rapid sequence induction    Intubated:  Postinduction    Mask Ventilation:  N/a    Attempts:  1    Attempted By:  CRNA    Method of Intubation:  Video laryngoscopy    Blade:  Rcihter 4    Laryngeal View Grade: Grade I - full view of cords      Difficult Airway Encountered?: No      Complications:  None    Airway Device:  Oral endotracheal tube    Airway Device Size:  7.5    Style/Cuff Inflation:  Cuffed (inflated to minimal occlusive pressure)    Tube secured:  21    Secured at:  The lips    Placement Verified By:  Capnometry    Complicating Factors:  None    Findings Post-Intubation:  BS equal bilateral and atraumatic/condition of teeth unchanged  Notes:      Cuff Pressure 25cm H20

## 2023-12-19 NOTE — ASSESSMENT & PLAN NOTE
Presented with complete left sided neglect  Possible dense right mca sign of CT head, official read pending.   CTA pending.  RF: atrial fib, CAD, HF with ICD, HTN, HLD    12/20 called for neurological change. Patient acutely unresponsive. CT head revealed Increasing hypodensity and edema throughout the distribution of the right MCA. There is increasing mass effect with 1 point 6 cm of right to left midline shift. There is complete compression of the right lateral ventricle.    Plan:  Patient underwent RSI by Dr. Orozco  Hypertonic saline bolus per ICU  Hypertonic saline 40 ml/hr  Mannitol   Keppra 1000 mg BID  Neurosurgery consulted, awaiting recommendations    Further recommendations to follow.

## 2023-12-19 NOTE — ED PROVIDER NOTES
Encounter Date: 12/19/2023    SCRIBE #1 NOTE: I, Arvin Schneider, am scribing for, and in the presence of,  Rob Delgado MD. I have scribed the following portions of the note - Other sections scribed: HPI, ROS, PE.       History     Chief Complaint   Patient presents with    Cerebrovascular Accident     VAN positive patient presents with LKN today at 0915 with left sided facial droop, hemiparesis, and slurred speech. GCS 15, delayed responses when asked questions. Hx of afib on xarelto.     67 y/o male with a history of A fib on Xarelto presents to the ED via EMS with stroke-like symptoms. Per EMS, pt had an acute onset of left-sided facial droop, left-sided weakness, and slurred speech. He has had a fixed right gaze since onset of symptoms. Pt also vomited up some clear mucus pta. He has been dealing with a viral infection for the past 36 hours. LKN 9:15. .    The history is provided by the EMS personnel. The history is limited by the condition of the patient. No  was used.     Review of patient's allergies indicates:  No Known Allergies  Past Medical History:   Diagnosis Date    Arthritis     Atrial fibrillation     BPH (benign prostatic hyperplasia)     Cardiac arrest     Coronary artery disease     Cyst, kidney, acquired     Diverticulosis     Hyperlipidemia     Hypertension     MI (myocardial infarction)     Obesity     Steatosis of liver      Past Surgical History:   Procedure Laterality Date    A-V CARDIAC PACEMAKER INSERTION Right     CARDIAC CATHETERIZATION      COLONOSCOPY W/ BIOPSIES      excision of colon       Family History   Problem Relation Age of Onset    Hypertension Mother     Hypertension Father     Hypertension Sister      Social History     Tobacco Use    Smoking status: Former    Smokeless tobacco: Never   Substance Use Topics    Alcohol use: Not Currently    Drug use: Not Currently     Review of Systems   Gastrointestinal:  Positive for vomiting.   Neurological:   Positive for facial asymmetry, speech difficulty and weakness.       Physical Exam     Initial Vitals   BP Pulse Resp Temp SpO2   12/19/23 1041 12/19/23 1041 12/19/23 1041 12/19/23 1140 12/19/23 1041   (!) 162/94 98 20 97.6 °F (36.4 °C) 98 %      MAP       --                Physical Exam    Nursing note and vitals reviewed.  Constitutional: He appears well-developed and well-nourished. He is not diaphoretic. He appears distressed.   HENT:   Head: Normocephalic and atraumatic.   Right Ear: External ear normal.   Left Ear: External ear normal.   Nose: Nose normal.   Eyes: Pupils are equal, round, and reactive to light. Right eye exhibits no discharge. Left eye exhibits no discharge.   Cardiovascular:  Normal rate, regular rhythm and normal heart sounds.     Exam reveals no gallop and no friction rub.       No murmur heard.  Pulmonary/Chest: Effort normal and breath sounds normal. No respiratory distress. He has no wheezes. He has no rhonchi. He has no rales. He exhibits no tenderness.   Abdominal: Abdomen is soft. Bowel sounds are normal. He exhibits no distension and no mass. There is no abdominal tenderness. There is no rebound and no guarding.   Musculoskeletal:         General: No edema.     Neurological: He is alert. A cranial nerve deficit is present.   Unable to move left side  Left-sided facial droop  Right fixed gaze  Slurred speech  Difficulty following commands   Skin: Skin is warm and dry. Capillary refill takes less than 2 seconds.         ED Course   Procedures  Labs Reviewed   COMPREHENSIVE METABOLIC PANEL - Abnormal; Notable for the following components:       Result Value    Glucose Level 137 (*)     Creatinine 1.32 (*)     Protein Total 8.1 (*)     Globulin 3.8 (*)     Bilirubin Total 2.1 (*)     All other components within normal limits   PROTIME-INR - Abnormal; Notable for the following components:    PT 16.9 (*)     INR 1.4 (*)     All other components within normal limits   TSH - Abnormal;  Notable for the following components:    TSH 9.889 (*)     All other components within normal limits   LIPID PANEL - Abnormal; Notable for the following components:    HDL Cholesterol 30 (*)     All other components within normal limits   CBC WITH DIFFERENTIAL - Abnormal; Notable for the following components:    IG# 0.05 (*)     All other components within normal limits   ISTAT CHEM8 - Abnormal; Notable for the following components:    POC Glucose 140 (*)     POC Anion Gap 17 (*)     All other components within normal limits   CBC W/ AUTO DIFFERENTIAL    Narrative:     The following orders were created for panel order CBC W/ AUTO DIFFERENTIAL.  Procedure                               Abnormality         Status                     ---------                               -----------         ------                     CBC with Differential[[id number]]       Abnormal            Final result                 Please view results for these tests on the individual orders.   POCT GLUCOSE, HAND-HELD DEVICE        ECG Results              ECG 12 lead (Final result)  Result time 12/19/23 17:24:50      Final result by Interface, Lab In [facility] (12/19/23 17:24:50)                   Narrative:    Test Reason : R29.818,    Vent. Rate : 073 BPM     Atrial Rate : 000 BPM     P-R Int : 000 ms          QRS Dur : 090 ms      QT Int : 458 ms       P-R-T Axes : 000 092 206 degrees     QTc Int : 504 ms    Atrial fibrillation with occasional ventricular-paced complexes  Rightward axis  Septal infarct ,age undetermined  ST and T wave abnormality, consider inferior ischemia  ST and T wave abnormality, consider anterolateral ischemia  Prolonged QT  Abnormal ECG  When compared with ECG of 14-OCT-2023 07:38,  Electronic ventricular pacemaker has replaced Atrial fibrillation  Confirmed by Jg Allen MD ([id number]) on 12/19/2023 5:24:41 PM    Referred By: [email or web address]   SELF           Confirmed By:Jg Allen MD                                   Imaging Results              IR Thrombectomy Intracranial Inc all Imaging (Final result)  Result time 12/19/23 15:11:53      Final result by Tani Calderon MD (12/19/23 15:11:53)                   Impression:      Fluoroscopic assistance provided as above.      Electronically signed by: Tani Calderon  Date:    12/19/2023  Time:    15:11               Narrative:    EXAMINATION:  IR THROMBECTOMY INTRACRANIAL INC ALL IMAGING    CLINICAL HISTORY:  Cerebral infarction, unspecifiedstroke;    FINDINGS:  Fluoroscopic assistance provided during vascular procedure.  Images were independently interpreted by the attending physician performing the procedure.    Fluoro time 9.7 minutes.    Reference Air Kerma: 917 mGy.                                       CTA STROKE MULTI-PHASE (Final result)  Result time 12/19/23 12:59:46      Final result by Laureen Christina MD (12/19/23 12:59:46)                   Impression:      Irregular appearance of the right internal carotid artery near the skull base and petrous portion.  This is of uncertain etiology.  This could be related to soft atheromatous plaque, ill-defined dissection flap or artifact.    Poor enhancement of the right anterior circulation including the MCA and HÉCTOR on arterial phase.  There is delayed enhancement of the right HÉCTOR and MCA seen on delayed phase postcontrast imaging suggesting upstream/inflow abnormality.    Very subtle asymmetric loss of gray-white differentiation in the right cerebral hemisphere most pronounced at the frontal lobe and basal ganglia.  No appreciable hemorrhage.    Findings discussed with clinician caring for patient   Jer 12/19/2023 12:39.      Electronically signed by: Laureen Christina  Date:    12/19/2023  Time:    12:59               Narrative:    EXAMINATION:  CTA STROKE MULTI-PHASE    CLINICAL HISTORY:  Neuro deficit, acute, stroke suspected;    TECHNIQUE:  CT angiogram was performed from the level of the jackie to the  vertex prior to and following the IV administration of intravenous contrast.  Axial, sagittal and coronal reconstructions and maximum intensity projection reconstructions were performed. Arterial stenosis percentages are based on NASCET measurement criteria.    Automated tube current modulation, weight-based exposure dosing, and/or iterative reconstruction technique utilized to reach lowest reasonably achievable exposure rate.    DLP: 2045 mGycm    COMPARISON:  CT head 12/19/2023 at 10:57 hours    FINDINGS:  CTA NECK:    AORTIC ARCH AND GREAT VESSELS: 2 vessel left aortic arch.    RIGHT ICA: There is atherosclerotic plaque at the carotid bulb and proximal internal carotid artery with less than 50% stenosis.  There is irregular contour and inhomogeneous enhancement of the cervical carotid artery at the skull base and at the petrous carotid artery (for example series 1, image 289).    LEFT ICA: Mild atherosclerotic plaque at the carotid bulb without hemodynamically significant stenosis.    VERTEBRAL ARTERIES: Diminutive vertebral arteries without stenosis.    CTA HEAD:    ANTERIOR CIRCULATION: On the arterial phase imaging there is asymmetric hypo perfusion and irregular appearance of the cervical carotid artery on the right.  There is poor enhancement at the HÉCTOR and M1 segment.  Contrast fades distally towards the M2 segment.  There is gross asymmetric hypoenhancement of the vasculature at the sylvian fissure when comparing right to left on the arterial phase.  On a slightly delayed phase obtained approximately 30-40 seconds later there is enhancement at the right anterior circulation which is now more symmetric compared to the left suggesting potential delayed in flow phenomenon or perfusion via the [id number].  The left anterior circulation enhances normally without significant stenosis.    POSTERIOR CIRCULATION: No hemodynamically significant stenosis, aneurysmal dilatation, or dissection.    NON-VASCULAR  STRUCTURES (LIMITED EVALUATION): There is very subtle loss of gray-white differentiation in the region of the insular cortex and right frontal lobe and slight blurring of delineation of the basal ganglia on the right.  No appreciable hemorrhage.    Poor dentition.  Dental caries and periapical lucency.                                       CT HEAD FOR STROKE (Final result)  Result time 12/19/23 11:33:25   Procedure changed from CT Head Without Contrast     Final result by Tani Calderon MD (12/19/23 11:33:25)                   Impression:      No acute intracranial findings or significant interval change compared to May 2023.      Electronically signed by: Tani Calderon  Date:    12/19/2023  Time:    11:33               Narrative:    EXAMINATION:  CT HEAD FOR STROKE    CLINICAL HISTORY:  Neuro deficit, acute, stroke suspected;    TECHNIQUE:  CT imaging of the head performed from the skull base to the vertex without intravenous contrast.  mGycm. Automatic exposure control, adjustment of mA/kV or iterative reconstruction technique was used to reduce radiation.    COMPARISON:  23 May 2023    FINDINGS:  There is no acute cortical infarct, hemorrhage or mass lesion.  No new parenchymal attenuation abnormality.  Ventricular size is stable.  There are vascular calcifications.    Visualized paranasal sinuses and mastoid air cells are clear.                                       Medications   sodium chloride 0.9% flush 10 mL (has no administration in time range)   0.9%  NaCl infusion ( Intravenous New Bag 12/19/23 1800)   aspirin suppository 300 mg (has no administration in time range)   hydrALAZINE injection 10 mg (has no administration in time range)     And   labetaloL injection 10 mg (10 mg Intravenous Given 12/19/23 1840)   iopamidoL (ISOVUE-370) injection 100 mL (50 mLs Intravenous Given 12/19/23 1147)   iopamidoL (ISOVUE-370) injection 150 mL (150 mLs Other Given 12/19/23 1426)     Medical Decision  Making      The differential diagnosis includes, but is not limited to: Metabolic abnormality, intoxication, toxic ingestion, CVA, infection, structural (SAH, ICH, trauma, neoplastic), seizure/postictal, polypharmacy     Patient with significant left-sided weakness, right deviated gaze.  Altered mental status.  Concerning for CVA.  Code stroke was activated.  Seen evaluated by the stroke team.  Dense MCA sign, concern for possible occlusion on CTA but there are some difficulties getting this information across secondary to difficulty with our PACS system here.  Ultimately interventional neurologist Dr. Randle decided to take patient to cath lab for possible thrombectomy.  Care transferred.    Amount and/or Complexity of Data Reviewed  Independent Historian: EMS     Details: Per EMS, pt had an acute onset of left-sided facial droop, left-sided weakness, and slurred speech. He has had a fixed right gaze since onset of symptoms. Pt also vomited up some clear mucus pta. He has been dealing with a viral infection for the past 36 hours. LKN 9:15. . Pt has a history of Afib and is on Xarelto.  External Data Reviewed: notes.     Details: Chart review significant for edema, longstanding persistent AFib, hypertension, ICD  Labs: ordered. Decision-making details documented in ED Course.  Radiology: ordered and independent interpretation performed.     Details: Dense MCA  ECG/medicine tests: ordered and independent interpretation performed. Decision-making details documented in ED Course.    Risk  Prescription drug management.  Decision regarding hospitalization.            Scribe Attestation:   Scribe #1: I performed the above scribed service and the documentation accurately describes the services I performed. I attest to the accuracy of the note.    Attending Attestation:           Physician Attestation for Scribe:  Physician Attestation Statement for Scribe #1: I, Rob Delgado MD, reviewed documentation, as  scribed by Arvin Schneider in my presence, and it is both accurate and complete.             ED Course as of 12/19/23 2003   Tue Dec 19, 2023   1153 EKG from 1147 shows AFib rate of 73.  QTC mildly prolonged at 504.  Normal axis.  No ST elevation or depression.  Inverted T-waves in the anterior and lateral leads. [MM]   1540 TSH(!): 9.889 [MM]   1540 INR(!): 1.4 [MM]   1540 CBC W/ AUTO DIFFERENTIAL(!)  No electrolyte abnormality renal dysfunction [MM]   1540 Comprehensive metabolic panel(!)  Mildly elevated creatinine.  No other significant electrolyte abnormality. [MM]      ED Course User Index  [MM] Rob Delgado MD                             Clinical Impression:  Final diagnoses:  [R29.818] Acute focal neurological deficit  [I63.9] Cerebrovascular accident (CVA), unspecified mechanism (Primary)          ED Disposition Condition    Admit Stable                Rob Delgado MD  12/19/23 2004

## 2023-12-19 NOTE — CONSULTS
[facility] - Emergency Dept  Neurology  Consult Note    Patient Name: Delio Daniel Jr.  MRN: [id number]  Admission Date: 12/19/2023  Hospital Length of Stay: 0 days  Code Status: Prior   Attending Provider: Rob Delgado MD   Consulting Provider: Ijeoma Hernandez NP  Primary Care Physician: Magdi Rivera DO  Principal Problem:<principal problem not specified>    Inpatient consult to Vascular (Stroke) Neurology  Consult performed by: Ijeoma Hernandez NP  Consult ordered by: Dale Ventura PA-C      Inpatient consult to Vascular (Stroke) Neurology  Consult performed by: Ijeoma Hernandez NP  Consult ordered by: Ijeoma Hernandez NP         Subjective:     Chief Complaint:    Chief Complaint   Patient presents with    Cerebrovascular Accident     VAN positive patient presents with LKN today at 0915 with left sided facial droop, hemiparesis, and slurred speech. GCS 15, delayed responses when asked questions. Hx of afib on xarelto.          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 Patient presented to [facility] with complaints of left facial droop, slurred speech, and left sided hemiparesis. NIH 18. Complete left-sided neglect upon assessment with right gaze deviation. Unofficial read of CT head by radiology head revealing for possible dense right MCA sign.      Past Medical History:   Diagnosis Date    Arthritis     Atrial fibrillation     BPH (benign prostatic hyperplasia)     Cardiac arrest     Coronary artery disease     Cyst, kidney, acquired     Diverticulosis     Hyperlipidemia     Hypertension     MI (myocardial infarction)     Obesity     Steatosis of liver         Past Surgical History:   Procedure Laterality Date    A-V CARDIAC PACEMAKER INSERTION Right     CARDIAC CATHETERIZATION      COLONOSCOPY W/ BIOPSIES      excision of colon         Review of patient's allergies indicates:  No Known Allergies    Current Neurological Medications:     No current facility-administered medications on file prior to encounter.     Current Outpatient Medications on File Prior to Encounter   Medication Sig    albuterol (PROVENTIL/VENTOLIN HFA) 90 mcg/actuation inhaler Inhale 2 puffs into the lungs every 6 (six) hours as needed for Wheezing. Rescue (Patient not taking: Reported on 8/22/2023)    aspirin 81 MG Chew chew and swallow 1 tablet by mouth daily    atorvastatin (LIPITOR) 40 MG tablet Take 1 tablet (40 mg total) by mouth once daily.    cetirizine (ZYRTEC) 10 MG tablet Take 1 tablet (10 mg total) by mouth once daily. for 14 days    diltiaZEM (CARDIZEM CD) 360 MG 24 hr capsule Take 1 capsule (360 mg total) by mouth once daily.    losartan (COZAAR) 50 MG tablet Take 1 tablet (50 mg total) by mouth once daily.    metoprolol succinate (TOPROL-XL) 200 MG 24 hr tablet Take 1 tablet (200 mg total) by mouth 2 (two) times daily.    omeprazole (PRILOSEC) 20 MG capsule Take 1 capsule (20 mg total) by mouth once daily. One tab by mouth daily    potassium chloride (KLOR-CON) 20 mEq Pack Take 20 mEq by mouth once daily.    spironolactone (ALDACTONE) 50 MG tablet Take 1 tablet (50 mg total) by mouth once daily.    torsemide (DEMADEX) 10 MG Tab Take 1 tablet (10 mg total) by mouth once daily.    vitamin D (VITAMIN D3) 1000 units Tab Take 1 tablet (1,000 Units total) by mouth once daily.    XARELTO 20 mg Tab TAKE 1 TABLET BY MOUTH DAILY with SUPPER     Family History       Problem Relation (Age of Onset)    Hypertension Mother, Father, Sister          Tobacco Use    Smoking status: Former    Smokeless tobacco: Never   Substance and Sexual Activity    Alcohol use: Not Currently    Drug use: Not  "Currently    Sexual activity: Not Currently     Partners: Female     Review of Systems   Unable to perform ROS: Acuity of condition     Objective:     Vital Signs (Most Recent):  Pulse: 98 (12/19/23 1041)  Resp: 20 (12/19/23 1041)  BP: (!) 162/94 (12/19/23 1041)  SpO2: 98 % (12/19/23 1041) Vital Signs (24h Range):  Pulse:  [98] 98  Resp:  [20] 20  SpO2:  [98 %] 98 %  BP: (162)/(94) 162/94     Weight: 110 kg (242 lb 8.1 oz)  Body mass index is 35.11 kg/m².     Physical Exam  Constitutional:       Appearance: He is obese.   HENT:      Head: Normocephalic and atraumatic.   Eyes:      Comments: Right gaze deviation   Pulmonary:      Effort: Pulmonary effort is normal.   Abdominal:      Palpations: Abdomen is soft.   Musculoskeletal:         General: Normal range of motion.      Cervical back: Normal range of motion and neck supple.   Skin:     General: Skin is warm and dry.      Capillary Refill: Capillary refill takes less than 2 seconds.   Neurological:      Mental Status: He is alert and oriented to person, place, and time.      Comments: Follows commands.  Severe Dysarthria  LUE/LLE hemiplegia  Complete sensory loss of left side  Left hemianopia     Psychiatric:         Mood and Affect: Mood normal.         Behavior: Behavior normal.          NEUROLOGICAL EXAMINATION:     MENTAL STATUS   Oriented to person, place, and time.       Significant Labs: CBC: No results for input(s): "WBC", "HGB", "HCT", "PLT" in the last 48 hours.  CMP: No results for input(s): "GLU", "NA", "K", "CL", "CO2", "BUN", "CREATININE", "CALCIUM", "MG", "PROT", "ALBUMIN", "BILITOT", "ALKPHOS", "AST", "ALT", "ANIONGAP", "EGFRNONAA" in the last 48 hours.    Significant Imaging: I have reviewed all pertinent imaging results/findings within the past 24 hours.  Assessment and Plan:     Stroke  Presented with complete left sided neglect  Possible dense right mca sign of CT head, official read pending.   CTA pending.  RF: atrial fib, CAD, HF with ICD, " HTN, HLD    CTA pending, Delay due to lack of IV access. Possible LVO.     Plan:  Rule out stroke  Admit for stroke workup  Allow permissive HTN ... prn hydralazine and labetalol for SBP > 220 or DBP > 120     - after 24 hours from symptom onset, ok to normalize blood pressure  Neuro checks q4hr ... stat CTh if any neuro change   Begin ASA 325mg daily .... if failed Hale, then ASA 300mg IN daily  DVT ppx with SCD or lovenox 40 s/c daily  Continuous telemetry monitoring  Bedrest and HOB flat for 24 hours  NPO until passes Hale or cleared by SLP  PT/OT/SLP to evaluate after 24 hour bedrest completed (from symptom onset)  Further recommendations to follow.    .Plan of care update after thrombectomy:     -Vital signs and neuro checks per thrombectomy protocol  -Keep sheath in place x24 hours  -Keep -180 (Levophed is first choice if vasopressor is needed)  -NS @ 100mL/hr  -Repeat CTh 24 hours after thrombectomy completion  -Continue ASA 81 mg  daily. Will start after repeat CT is negative.  -Add Atorvastatin 40 daily  -continue stroke workup           VTE Risk Mitigation (From admission, onward)      None            Thank you for your consult. Will follow-up with patient. Please contact us if you have any additional questions.    Ijeoma Hernandez NP  Neurology  [facility] - Emergency Dept

## 2023-12-19 NOTE — ED NOTES
Patient had a bowel movement. Clothes removed from patient. Cleaned with wipes. Gown and Casey pad placed.

## 2023-12-19 NOTE — NURSING
Nurses Note -- 4 Eyes      12/19/2023   4:35 PM      Skin assessed during: Admit      [x] No Altered Skin Integrity Present    [x]Prevention Measures Documented      [] Yes- Altered Skin Integrity Present or Discovered   [] LDA Added if Not in Epic (Describe Wound)   [] New Altered Skin Integrity was Present on Admit and Documented in LDA   [] Wound Image Taken    Wound Care Consulted? No    Attending Nurse:  Jhonny Johnson RN/Staff Member:  Arvin Harding RN

## 2023-12-19 NOTE — TRANSFER OF CARE
Anesthesia Transfer of Care Note    Patient: Delio Daniel Jr.    Procedure(s) Performed: * No procedures listed *    Patient location: ICU    Anesthesia Type: general    Transport from OR: Continuous ECG monitoring in transport. Continuous SpO2 monitoring in transport. Continuos invasive BP monitoring in transport. Transported from OR on room air with adequate spontaneous ventilation    Post pain: adequate analgesia    Post assessment: no apparent anesthetic complications    Post vital signs: stable    Level of consciousness: responds to stimulation    Nausea/Vomiting: no nausea/vomiting    Complications: none    Transfer of care protocol was followedComments: Transported to ICU BY ICU nurse VSS      Last vitals: Visit Vitals  BP (!) 141/101   Pulse 87   Temp 36 °C (96.8 °F)   Resp 10   Wt 110 kg (242 lb 8.1 oz)   SpO2 99%   BMI 35.11 kg/m²

## 2023-12-19 NOTE — ANESTHESIA PREPROCEDURE EVALUATION
12/19/2023  Delio Daniel Jr. is a 68 y.o., male.      Pre-op Assessment    I have reviewed the Patient Summary Reports.     I have reviewed the Nursing Notes. I have reviewed the NPO Status.   I have reviewed the Medications.     Review of Systems  Anesthesia Hx:  No problems with previous Anesthesia                Social:  Former Smoker       Hematology/Oncology:                   Hematology Comments: Hx DVT                    Cardiovascular:     Hypertension  Past MI CAD    Dysrhythmias (hx v-fib)   Denies Angina. CHF    hyperlipidemia    2nd degree AV block     Coronary Artery Disease:          Hx of Myocardial Infarction                  Hypertension     Disorder of Cardiac Rhythm, Atrial Fibrillation     Pulmonary:    Denies COPD.  Denies Asthma.                    Renal/:  Chronic Renal Disease no renal calculi BPH      Kidney Function/Disease             Hepatic/GI:      Denies GERD. Liver Disease,  Denies Hepatitis.        Liver Disease        Neurological:   CVA (thrombotic)    Denies Seizures.                   CVA - Cerebrovasular Accident                 Endocrine:  Denies Diabetes. Denies Hypothyroidism.  Denies Hyperthyroidism.       Obesity / BMI > 30      Physical Exam  General: Well nourished and Confusion    Airway:  Mallampati: unable to assess   Neck ROM: Normal ROM    Dental:  Periodontal disease        Anesthesia Plan  Type of Anesthesia, risks & benefits discussed:    Anesthesia Type: Gen ETT  Intra-op Monitoring Plan: Standard ASA Monitors  Induction:  IV  Informed Consent: Informed consent signed with the Patient representative and all parties understand the risks and agree with anesthesia plan.  All questions answered.   ASA Score: 3 Emergent  Day of Surgery Review of History & Physical: H&P Update referred to the surgeon/provider.    Ready For Surgery From Anesthesia Perspective.      .

## 2023-12-19 NOTE — OP NOTE
[facility]                       [address] [place] [address]    PATIENT NAME:      CRISTY DAWSON   YOB: 1955  CSN:               [id number]  MRN:               [id number]  ADMIT DATE:        12/19/2023 10:44:00  PHYSICIAN:         Delonte Randle MD                          OPERATIVE REPORT      DATE OF SURGERY:    12/19/2023 00:00:00    SURGEON:  Delonte Randle MD    PREOPERATIVE DIAGNOSIS:  Right middle cerebral artery syndrome.    POSTOPERATIVE DIAGNOSIS:  Right internal carotid artery terminus occlusion.    PROCEDURE PERFORMED:  Percutaneous arterial transluminal mechanical   thrombectomy, intracranial.    LEVEL OF SEDATION:  General endotracheal anesthesia.    COMPLICATIONS:  None.    DETAILED DESCRIPTION:  Following informed consent, and with the patient under   general endotracheal anesthesia, he was prepped and draped in the usual sterile   fashion.  I punctured the right femoral artery, placing a 5-[place]izen of [place] sheath and   then upsizing to 8-Citizen of [place] sheath without incident.  I introduced an Infinity +90   cm sheath advancing it over a penumbra Select catheter and Cook Glidewire,   selecting the right common carotid artery.  Angiographic images demonstrated a   normal bifurcation.  I advanced the system to the right internal carotid artery.    Cerebral AP and lateral views demonstrated complete occlusion of the right   internal carotid artery at it's terminus.  I then introduced a penumbra red 72   aspiration catheter, advancing it over an Jamel microwire to the occlusion.    I performed mechanical thrombectomy via direct aspiration of the clot.    Immediate followup angiography demonstrated recanalization of the carotid   artery.  Intracranially, there was persistent occlusion of an M3 branch of the   right middle cerebral artery.  I then introduced a penumbra red 62 aspiration   catheter advancing  it over a marksman microcatheter and Jamel microwire into   the right middle cerebral artery.  I continued the microcatheter and microwire   distal to the occlusion.  I removed the microwire and then deployed a 3 x 4 x 41   mm thrombectomy stent that I allowed approximately 3 minutes to incorporate the   clot.  I then removed the stent under continuous aspiration.  Followup   angiography demonstrated recanalization of that artery.  Final angiographic   images demonstrated successful thrombectomy with TICI III flow.  I removed all   catheters.  The 8-[place] short sheath was secured in place.  The patient   tolerated procedure well without apparent complication.        ______________________________  Delonte Randle MD    DEP/AQS  DD:  12/19/2023  Time:  02:39PM  DT:  12/19/2023  Time:  04:45PM  Job #:  [id number]      OPERATIVE REPORT

## 2023-12-19 NOTE — ASSESSMENT & PLAN NOTE
Presented with complete left sided neglect  Possible dense right mca sign of CT head, official read pending.   CTA pending.  RF: atrial fib, CAD, HF with ICD, HTN, HLD    CTA pending, Delay due to lack of IV access. Possible LVO.     Plan:  Rule out stroke  Admit for stroke workup  Allow permissive HTN ... prn hydralazine and labetalol for SBP > 220 or DBP > 120     - after 24 hours from symptom onset, ok to normalize blood pressure  Neuro checks q4hr ... stat CTh if any neuro change   Begin ASA 325mg daily .... if failed Hale, then ASA 300mg SC daily  DVT ppx with SCD or lovenox 40 s/c daily  Continuous telemetry monitoring  Bedrest and HOB flat for 24 hours  NPO until passes Hale or cleared by SLP  PT/OT/SLP to evaluate after 24 hour bedrest completed (from symptom onset)  Further recommendations to follow.

## 2023-12-19 NOTE — PLAN OF CARE
Problem: Adult Inpatient Plan of Care  Goal: Plan of Care Review  Outcome: Ongoing, Progressing  Goal: Patient-Specific Goal (Individualized)  Outcome: Ongoing, Progressing  Goal: Absence of Hospital-Acquired Illness or Injury  Outcome: Ongoing, Progressing  Goal: Optimal Comfort and Wellbeing  Outcome: Ongoing, Progressing  Goal: Readiness for Transition of Care  Outcome: Ongoing, Progressing     Problem: Skin Injury Risk Increased  Goal: Skin Health and Integrity  Outcome: Ongoing, Progressing     Problem: Adjustment to Illness (Stroke, Ischemic/Transient Ischemic Attack)  Goal: Optimal Coping  Outcome: Ongoing, Progressing     Problem: Bowel Elimination Impaired (Stroke, Ischemic/Transient Ischemic Attack)  Goal: Effective Bowel Elimination  Outcome: Ongoing, Progressing     Problem: Cerebral Tissue Perfusion (Stroke, Ischemic/Transient Ischemic Attack)  Goal: Optimal Cerebral Tissue Perfusion  Outcome: Ongoing, Progressing     Problem: Cognitive Impairment (Stroke, Ischemic/Transient Ischemic Attack)  Goal: Optimal Cognitive Function  Outcome: Ongoing, Progressing     Problem: Communication Impairment (Stroke, Ischemic/Transient Ischemic Attack)  Goal: Improved Communication Skills  Outcome: Ongoing, Progressing     Problem: Functional Ability Impaired (Stroke, Ischemic/Transient Ischemic Attack)  Goal: Optimal Functional Ability  Outcome: Ongoing, Progressing     Problem: Respiratory Compromise (Stroke, Ischemic/Transient Ischemic Attack)  Goal: Effective Oxygenation and Ventilation  Outcome: Ongoing, Progressing     Problem: Sensorimotor Impairment (Stroke, Ischemic/Transient Ischemic Attack)  Goal: Improved Sensorimotor Function  Outcome: Ongoing, Progressing     Problem: Swallowing Impairment (Stroke, Ischemic/Transient Ischemic Attack)  Goal: Optimal Eating and Swallowing without Aspiration  Outcome: Ongoing, Progressing     Problem: Urinary Elimination Impaired (Stroke, Ischemic/Transient Ischemic  Attack)  Goal: Effective Urinary Elimination  Outcome: Ongoing, Progressing     Problem: Fall Injury Risk  Goal: Absence of Fall and Fall-Related Injury  Outcome: Ongoing, Progressing

## 2023-12-19 NOTE — H&P
[facility] - Emergency Dept  Pulmonary Critical Care Note    Patient Name: Delio Daniel Jr.  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 His last known normal was 9:15 per EMS. Stroke protocol in ED initiated. Unofficial CT head showed possible dense R MCA. Pt taken to cath lab for BRAD thrombectomy. Admitted to ICU for post-operative care and monitoring.     Hospital Course/Significant events:    24 Hour Interval History:    Past Medical History:   Diagnosis Date    Arthritis     Atrial fibrillation     BPH (benign prostatic hyperplasia)     Cardiac arrest     Coronary artery disease     Cyst, kidney, acquired     Diverticulosis     Hyperlipidemia     Hypertension     MI (myocardial infarction)     Obesity     Steatosis of liver      Past Surgical History:   Procedure Laterality Date    A-V CARDIAC PACEMAKER INSERTION Right     CARDIAC CATHETERIZATION      COLONOSCOPY W/ BIOPSIES      excision of colon       Social History     Socioeconomic History    Marital status:     Number of children: 9   Occupational History    Occupation: retired   Tobacco Use    Smoking status: Former    Smokeless tobacco: Never   Substance and Sexual Activity    Alcohol use: Not Currently    Drug use: Not Currently    Sexual activity: Not Currently     Partners: Female     Social Determinants of Health     Financial Resource Strain: Low Risk   (10/19/2022)    Overall Financial Resource Strain (CARDIA)     Difficulty of Paying Living Expenses: Not hard at all   Food Insecurity: No Food Insecurity (10/19/2022)    Hunger Vital Sign     Worried About Running Out of Food in the Last Year: Never true     Ran Out of Food in the Last Year: Never true   Transportation Needs: No Transportation Needs (10/19/2022)    PRAPARE - Transportation     Lack of Transportation (Medical): No     Lack of Transportation (Non-Medical): No   Physical Activity: Sufficiently Active (10/19/2022)    Exercise Vital Sign     Days of Exercise per Week: 6 days     Minutes of Exercise per Session: 60 min   Stress: No Stress Concern Present (10/19/2022)    [place] [employer] - Occupational Stress Questionnaire     Feeling of Stress : Not at all   Social Connections: Unknown (10/19/2022)    Social Connection and Isolation Panel [NHANES]     Frequency of Communication with Friends and Family: More than three times a week     Frequency of Social Gatherings with Friends and Family: More than three times a week     Attends Mormonism Services: More than 4 times per year     Active Member of Clubs or Organizations: No     Attends Club or Organization Meetings: Never   Housing Stability: Low Risk  (10/19/2022)    Housing Stability Vital Sign     Unable to Pay for Housing in the Last Year: No     Number of Places Lived in the Last Year: 1     Unstable Housing in the Last Year: No     Current Outpatient Medications   Medication Instructions    albuterol (PROVENTIL/VENTOLIN HFA) 90 mcg/actuation inhaler 2 puffs, Inhalation, Every 6 hours PRN, Rescue    aspirin 81 MG Chew chew and swallow 1 tablet by mouth daily    atorvastatin (LIPITOR) 40 mg, Oral, Daily    cetirizine (ZYRTEC) 10 mg, Oral, Daily    diltiaZEM (CARDIZEM CD) 360 mg, Oral, Daily    losartan (COZAAR) 50 mg, Oral, Daily    metoprolol succinate (TOPROL-XL) 200 mg, Oral, 2 times daily    omeprazole (PRILOSEC) 20 mg,  Oral, Daily, One tab by mouth daily    potassium chloride (KLOR-CON) 20 mEq Pack 20 mEq, Oral, Daily    spironolactone (ALDACTONE) 50 mg, Oral, Daily    torsemide (DEMADEX) 10 mg, Oral, Daily    vitamin D (VITAMIN D3) 1,000 Units, Oral, Daily    XARELTO 20 mg Tab TAKE 1 TABLET BY MOUTH DAILY with SUPPER     Current Inpatient Medications      Current Intravenous Infusions    ROS   Unable to assess given pt's currently anesthesia effects.     Objective:       Intake/Output Summary (Last 24 hours) at 12/19/2023 1605  Last data filed at 12/19/2023 1436  Gross per 24 hour   Intake 500 ml   Output --   Net 500 ml     Vital Signs (Most Recent):  Temp: 96.8 °F (36 °C) (12/19/23 1457)  Pulse: 87 (12/19/23 1457)  Resp: 10 (12/19/23 1457)  BP: (!) 141/101 (12/19/23 1457)  SpO2: 98 % (12/19/23 1551)  Body mass index is 35.11 kg/m².  Weight: 110 kg (242 lb 8.1 oz) Vital Signs (24h Range):  Temp:  [96.8 °F (36 °C)-97.6 °F (36.4 °C)] 96.8 °F (36 °C)  Pulse:  [68-98] 87  Resp:  [10-20] 10  SpO2:  [97 %-100 %] 98 %  BP: (141-178)/() 141/101     Physical Exam  GEN:  Responsive to verbal stimuli, CPAP placed  HEENT:  Periorbital edema with scleral icterus  CARDIO: regular rate, regular rhythm, normal S1, S2, no murmurs, rubs, clicks or gallops   PULM/CHEST: clear to auscultation bilaterally, no wheezes, rales or rhonchi, symmetric air entry, no distress  ABDOMEN: + BS, soft, non tender, non-distended, no guarding and no rebound. no masses or organomegaly   DERM: No acute rashes or lesions   EXT: peripheral pulses normal, no clubbing or cyanosis. No BLE edema.   NEURO:  Pt able to move R upper and lower extremities upon verbal command; L upper and lower extremities flaccid and unable to move    Lines/Drains/Airways       Drain  Duration             Male External Urinary Catheter 12/19/23 <1 day              Peripheral Intravenous Line  Duration                  Peripheral IV - Single Lumen 12/19/23 1045 20 G Lateral;Left Breast  <1 day         Peripheral IV - Single Lumen 12/19/23 1050 18 G Anterior;Distal;Left Upper Arm <1 day         Peripheral IV - Single Lumen 12/19/23 1053 20 G Right Antecubital <1 day         Sheath 12/19/23 1345 Right anterior;proximal <1 day                  Significant Labs:  Lab Results   Component Value Date    WBC 9.10 12/19/2023    HGB 16.0 12/19/2023    HCT 48.4 12/19/2023    MCV 89.5 12/19/2023     12/19/2023     BMP  Lab Results   Component Value Date     12/19/2023    K 4.4 12/19/2023    CHLORIDE 106 12/19/2023    CO2 25 12/19/2023    BUN 13.5 12/19/2023    CREATININE 1.32 (H) 12/19/2023    CALCIUM 9.1 12/19/2023    AGAP 10.0 09/28/2023    EGFRNONAA 61 04/23/2022     ABG  Recent Labs   Lab 12/19/23  1547   PH 7.350   PO2 76.0*   PCO2 46.0*   HCO3 25.4   POCBASEDEF -0.06       Mechanical Ventilation Support:   N/A      Significant Imaging:  Imaging Results              IR Thrombectomy Intracranial Inc all Imaging (Final result)  Result time 12/19/23 15:11:53      Final result by Tani Calderon MD (12/19/23 15:11:53)                   Impression:      Fluoroscopic assistance provided as above.      Electronically signed by: Tani Calderon  Date:    12/19/2023  Time:    15:11               Narrative:    EXAMINATION:  IR THROMBECTOMY INTRACRANIAL INC ALL IMAGING    CLINICAL HISTORY:  Cerebral infarction, unspecifiedstroke;    FINDINGS:  Fluoroscopic assistance provided during vascular procedure.  Images were independently interpreted by the attending physician performing the procedure.    Fluoro time 9.7 minutes.    Reference Air Kerma: 917 mGy.                                       CTA STROKE MULTI-PHASE (Final result)  Result time 12/19/23 12:59:46      Final result by Laureen Christina MD (12/19/23 12:59:46)                   Impression:      Irregular appearance of the right internal carotid artery near the skull base and petrous portion.  This is of uncertain etiology.  This could be  related to soft atheromatous plaque, ill-defined dissection flap or artifact.    Poor enhancement of the right anterior circulation including the MCA and HÉCTOR on arterial phase.  There is delayed enhancement of the right HÉCTOR and MCA seen on delayed phase postcontrast imaging suggesting upstream/inflow abnormality.    Very subtle asymmetric loss of gray-white differentiation in the right cerebral hemisphere most pronounced at the frontal lobe and basal ganglia.  No appreciable hemorrhage.    Findings discussed with clinician caring for patient Dr. Randle 12/19/2023 12:39.      Electronically signed by: Laureen Christina  Date:    12/19/2023  Time:    12:59               Narrative:    EXAMINATION:  CTA STROKE MULTI-PHASE    CLINICAL HISTORY:  Neuro deficit, acute, stroke suspected;    TECHNIQUE:  CT angiogram was performed from the level of the jackie to the vertex prior to and following the IV administration of intravenous contrast.  Axial, sagittal and coronal reconstructions and maximum intensity projection reconstructions were performed. Arterial stenosis percentages are based on NASCET measurement criteria.    Automated tube current modulation, weight-based exposure dosing, and/or iterative reconstruction technique utilized to reach lowest reasonably achievable exposure rate.    DLP: 2045 mGycm    COMPARISON:  CT head 12/19/2023 at 10:57 hours    FINDINGS:  CTA NECK:    AORTIC ARCH AND GREAT VESSELS: 2 vessel left aortic arch.    RIGHT ICA: There is atherosclerotic plaque at the carotid bulb and proximal internal carotid artery with less than 50% stenosis.  There is irregular contour and inhomogeneous enhancement of the cervical carotid artery at the skull base and at the petrous carotid artery (for example series 1, image 289).    LEFT ICA: Mild atherosclerotic plaque at the carotid bulb without hemodynamically significant stenosis.    VERTEBRAL ARTERIES: Diminutive vertebral arteries without stenosis.    CTA  HEAD:    ANTERIOR CIRCULATION: On the arterial phase imaging there is asymmetric hypo perfusion and irregular appearance of the cervical carotid artery on the right.  There is poor enhancement at the HÉCTOR and M1 segment.  Contrast fades distally towards the M2 segment.  There is gross asymmetric hypoenhancement of the vasculature at the sylvian fissure when comparing right to left on the arterial phase.  On a slightly delayed phase obtained approximately 30-40 seconds later there is enhancement at the right anterior circulation which is now more symmetric compared to the left suggesting potential delayed in flow phenomenon or perfusion via the [id number].  The left anterior circulation enhances normally without significant stenosis.    POSTERIOR CIRCULATION: No hemodynamically significant stenosis, aneurysmal dilatation, or dissection.    NON-VASCULAR STRUCTURES (LIMITED EVALUATION): There is very subtle loss of gray-white differentiation in the region of the insular cortex and right frontal lobe and slight blurring of delineation of the basal ganglia on the right.  No appreciable hemorrhage.    Poor dentition.  Dental caries and periapical lucency.                                       CT HEAD FOR STROKE (Final result)  Result time 12/19/23 11:33:25   Procedure changed from CT Head Without Contrast     Final result by Tani Calderon MD (12/19/23 11:33:25)                   Impression:      No acute intracranial findings or significant interval change compared to May 2023.      Electronically signed by: Tani Calderon  Date:    12/19/2023  Time:    11:33               Narrative:    EXAMINATION:  CT HEAD FOR STROKE    CLINICAL HISTORY:  Neuro deficit, acute, stroke suspected;    TECHNIQUE:  CT imaging of the head performed from the skull base to the vertex without intravenous contrast.  mGycm. Automatic exposure control, adjustment of mA/kV or iterative reconstruction technique was used to reduce  radiation.    COMPARISON:  23 May 2023    FINDINGS:  There is no acute cortical infarct, hemorrhage or mass lesion.  No new parenchymal attenuation abnormality.  Ventricular size is stable.  There are vascular calcifications.    Visualized paranasal sinuses and mastoid air cells are clear.                                     Assessment/Plan:     Assessment  Acute CVA  Possible dense R MCA seen on CT head  S/P BRAD thrombectomy on 12/19/23  H/O Afib (On Xarelto) and HF with ICD placement (2018)  CAD s/p STEMI 2003  HTN  HLD      Plan  Admit to ICU for close monitoring   Neurology on board; appreciate the assistance. Will continue to follow their recommendations.  Continue Q 1 hour neuro checks  Continue critical care monitoring and all ongoing care   Replete electrolytes as needed  Will continue to monitor for Afib; echo pending    DVT Prophylaxis: per Neurosurgery  GI Prophylaxis: N/A     32 minutes of critical care was time spent personally by me on the following activities: development of treatment plan with patient or surrogate and bedside caregivers, discussions with consultants, evaluation of patient's response to treatment, examination of patient, ordering and performing treatments and interventions, ordering and review of laboratory studies, ordering and review of radiographic studies, pulse oximetry, re-evaluation of patient's condition.  This critical care time did not overlap with that of any other provider or involve time for any procedures.     Gasper Barlow MD  Pulmonary Critical Care Medicine  [facility] - Emergency Dept  DOS: 12/19/2023

## 2023-12-20 ENCOUNTER — ANESTHESIA (OUTPATIENT)
Dept: SURGERY | Facility: HOSPITAL | Age: 68
DRG: 003 | End: 2023-12-20
Payer: MEDICARE

## 2023-12-20 ENCOUNTER — ANESTHESIA EVENT (OUTPATIENT)
Dept: SURGERY | Facility: HOSPITAL | Age: 68
DRG: 003 | End: 2023-12-20
Payer: MEDICARE

## 2023-12-20 LAB
ABS NEUT (OLG): 22.57 X10(3)/MCL (ref 2.1–9.2)
ALBUMIN SERPL-MCNC: 3.9 G/DL (ref 3.4–4.8)
ALBUMIN/GLOB SERPL: 1.3 RATIO (ref 1.1–2)
ALLENS TEST BLOOD GAS (OHS): ABNORMAL
ALP SERPL-CCNC: 62 UNIT/L (ref 40–150)
ALT SERPL-CCNC: 22 UNIT/L (ref 0–55)
ANION GAP SERPL CALC-SCNC: 8 MEQ/L
APTT PPP: 28.5 SECONDS (ref 23.2–33.7)
AST SERPL-CCNC: 27 UNIT/L (ref 5–34)
BASE EXCESS BLD CALC-SCNC: 1.9 MMOL/L
BASOPHILS # BLD AUTO: 0.06 X10(3)/MCL
BASOPHILS NFR BLD AUTO: 0.3 %
BILIRUB SERPL-MCNC: 1.8 MG/DL
BLOOD GAS SAMPLE TYPE (OHS): ABNORMAL
BUN SERPL-MCNC: 10 MG/DL (ref 8.4–25.7)
BUN SERPL-MCNC: 10.4 MG/DL (ref 8.4–25.7)
CA-I BLD-SCNC: 1.09 MMOL/L (ref 1.12–1.23)
CALCIUM SERPL-MCNC: 8 MG/DL (ref 8.8–10)
CALCIUM SERPL-MCNC: 8.5 MG/DL (ref 8.8–10)
CHLORIDE SERPL-SCNC: 105 MMOL/L (ref 98–107)
CHLORIDE SERPL-SCNC: 113 MMOL/L (ref 98–107)
CO2 BLDA-SCNC: 27.9 MMOL/L
CO2 SERPL-SCNC: 22 MMOL/L (ref 23–31)
CO2 SERPL-SCNC: 23 MMOL/L (ref 23–31)
CREAT SERPL-MCNC: 0.94 MG/DL (ref 0.73–1.18)
CREAT SERPL-MCNC: 1.02 MG/DL (ref 0.73–1.18)
CREAT/UREA NIT SERPL: 10
DRAWN BY BLOOD GAS (OHS): ABNORMAL
EOSINOPHIL # BLD AUTO: 0.01 X10(3)/MCL (ref 0–0.9)
EOSINOPHIL NFR BLD AUTO: 0 %
ERYTHROCYTE [DISTWIDTH] IN BLOOD BY AUTOMATED COUNT: 14.5 % (ref 11.5–17)
ERYTHROCYTE [DISTWIDTH] IN BLOOD BY AUTOMATED COUNT: 14.9 % (ref 11.5–17)
EST. AVERAGE GLUCOSE BLD GHB EST-MCNC: 114 MG/DL
FLOW (OHS): 50 LPM
GFR SERPLBLD CREATININE-BSD FMLA CKD-EPI: >60 MLS/MIN/1.73/M2
GFR SERPLBLD CREATININE-BSD FMLA CKD-EPI: >60 MLS/MIN/1.73/M2
GLOBULIN SER-MCNC: 3.1 GM/DL (ref 2.4–3.5)
GLUCOSE SERPL-MCNC: 135 MG/DL (ref 82–115)
GLUCOSE SERPL-MCNC: 207 MG/DL (ref 82–115)
GROUP & RH: NORMAL
HBA1C MFR BLD: 5.6 %
HCO3 BLDA-SCNC: 26.6 MMOL/L (ref 22–26)
HCT VFR BLD AUTO: 39.8 % (ref 42–52)
HCT VFR BLD AUTO: 43.8 % (ref 42–52)
HGB BLD-MCNC: 13.5 G/DL (ref 14–18)
HGB BLD-MCNC: 14.8 G/DL (ref 14–18)
IMM GRANULOCYTES # BLD AUTO: 0.16 X10(3)/MCL (ref 0–0.04)
IMM GRANULOCYTES NFR BLD AUTO: 0.7 %
INDIRECT COOMBS: NORMAL
INHALED O2 CONCENTRATION: 50 %
INR PPP: 1.2
INSTRUMENT WBC (OLG): 25.65 X10(3)/MCL
LYMPHOCYTES # BLD AUTO: 1.16 X10(3)/MCL (ref 0.6–4.6)
LYMPHOCYTES NFR BLD AUTO: 5.3 %
LYMPHOCYTES NFR BLD MANUAL: 1.28 X10(3)/MCL
LYMPHOCYTES NFR BLD MANUAL: 5 %
MAGNESIUM SERPL-MCNC: 2.1 MG/DL (ref 1.6–2.6)
MCH RBC QN AUTO: 29.7 PG (ref 27–31)
MCH RBC QN AUTO: 30 PG (ref 27–31)
MCHC RBC AUTO-ENTMCNC: 33.8 G/DL (ref 33–36)
MCHC RBC AUTO-ENTMCNC: 33.9 G/DL (ref 33–36)
MCV RBC AUTO: 88 FL (ref 80–94)
MCV RBC AUTO: 88.4 FL (ref 80–94)
MECH RR (OHS): 20 B/MIN
MODE (OHS): AC
MONOCYTES # BLD AUTO: 1.43 X10(3)/MCL (ref 0.1–1.3)
MONOCYTES NFR BLD AUTO: 6.6 %
MONOCYTES NFR BLD MANUAL: 1.8 X10(3)/MCL (ref 0.1–1.3)
MONOCYTES NFR BLD MANUAL: 7 %
NEUTROPHILS # BLD AUTO: 18.89 X10(3)/MCL (ref 2.1–9.2)
NEUTROPHILS NFR BLD AUTO: 87.1 %
NEUTROPHILS NFR BLD MANUAL: 88 %
NRBC BLD AUTO-RTO: 0 %
NRBC BLD AUTO-RTO: 0 %
OSMOLALITY SERPL: 312 MOSM/KG (ref 280–300)
OSMOLALITY SERPL: 322 MOSM/KG (ref 280–300)
OXYGEN DEVICE BLOOD GAS (OHS): ABNORMAL
PCO2 BLDA: 41 MMHG (ref 35–45)
PEEP RESPIRATORY: 5 CMH2O
PH BLDA: 7.42 [PH] (ref 7.35–7.45)
PHOSPHATE SERPL-MCNC: 2 MG/DL (ref 2.3–4.7)
PLATELET # BLD AUTO: 206 X10(3)/MCL (ref 130–400)
PLATELET # BLD AUTO: 225 X10(3)/MCL (ref 130–400)
PLATELET # BLD EST: NORMAL 10*3/UL
PMV BLD AUTO: 10 FL (ref 7.4–10.4)
PMV BLD AUTO: 10.1 FL (ref 7.4–10.4)
PO2 BLDA: 103 MMHG (ref 80–100)
POC PTINR: 1.3 (ref 0.9–1.2)
POC PTWBT: 15.3 SEC (ref 9.7–14.3)
POCT GLUCOSE: 143 MG/DL (ref 70–110)
POCT GLUCOSE: 192 MG/DL (ref 70–110)
POCT GLUCOSE: 222 MG/DL (ref 70–110)
POCT GLUCOSE: 224 MG/DL (ref 70–110)
POTASSIUM BLOOD GAS (OHS): 3.7 MMOL/L (ref 3.5–5)
POTASSIUM SERPL-SCNC: 3.8 MMOL/L (ref 3.5–5.1)
POTASSIUM SERPL-SCNC: 3.8 MMOL/L (ref 3.5–5.1)
PROT SERPL-MCNC: 7 GM/DL (ref 5.8–7.6)
PROTHROMBIN TIME: 14.9 SECONDS (ref 12.5–14.5)
RBC # BLD AUTO: 4.5 X10(6)/MCL (ref 4.7–6.1)
RBC # BLD AUTO: 4.98 X10(6)/MCL (ref 4.7–6.1)
RBC MORPH BLD: NORMAL
SAMPLE SITE BLOOD GAS (OHS): ABNORMAL
SAMPLE: ABNORMAL
SAO2 % BLDA: 98 %
SODIUM BLOOD GAS (OHS): 144 MMOL/L (ref 137–145)
SODIUM SERPL-SCNC: 133 MMOL/L (ref 136–145)
SODIUM SERPL-SCNC: 137 MMOL/L (ref 136–145)
SODIUM SERPL-SCNC: 144 MMOL/L (ref 136–145)
SPECIMEN OUTDATE: NORMAL
SPONT+MECH VT ON VENT: 475 ML
T3FREE SERPL-MCNC: 2.38 PG/ML (ref 1.58–3.91)
T4 FREE SERPL-MCNC: 1.02 NG/DL (ref 0.7–1.48)
TSH SERPL-ACNC: 1.25 UIU/ML (ref 0.35–4.94)
WBC # SPEC AUTO: 21.71 X10(3)/MCL (ref 4.5–11.5)
WBC # SPEC AUTO: 25.65 X10(3)/MCL (ref 4.5–11.5)

## 2023-12-20 PROCEDURE — 27200966 HC CLOSED SUCTION SYSTEM

## 2023-12-20 PROCEDURE — 80053 COMPREHEN METABOLIC PANEL: CPT

## 2023-12-20 PROCEDURE — 63600531 PHARM REV CODE 636 NO ALT 250 W HCPCS: Mod: JZ,JG | Performed by: NURSE PRACTITIONER

## 2023-12-20 PROCEDURE — 63600175 PHARM REV CODE 636 W HCPCS: Performed by: NURSE PRACTITIONER

## 2023-12-20 PROCEDURE — 37000008 HC ANESTHESIA 1ST 15 MINUTES: Performed by: NEUROLOGICAL SURGERY

## 2023-12-20 PROCEDURE — 63600175 PHARM REV CODE 636 W HCPCS: Performed by: NEUROLOGICAL SURGERY

## 2023-12-20 PROCEDURE — 85730 THROMBOPLASTIN TIME PARTIAL: CPT | Performed by: NURSE PRACTITIONER

## 2023-12-20 PROCEDURE — 36000711: Performed by: NEUROLOGICAL SURGERY

## 2023-12-20 PROCEDURE — C1765 ADHESION BARRIER: HCPCS | Performed by: NEUROLOGICAL SURGERY

## 2023-12-20 PROCEDURE — 94761 N-INVAS EAR/PLS OXIMETRY MLT: CPT

## 2023-12-20 PROCEDURE — 25000003 PHARM REV CODE 250

## 2023-12-20 PROCEDURE — 00N00ZZ RELEASE BRAIN, OPEN APPROACH: ICD-10-PCS | Performed by: NEUROLOGICAL SURGERY

## 2023-12-20 PROCEDURE — 85610 PROTHROMBIN TIME: CPT | Performed by: NURSE PRACTITIONER

## 2023-12-20 PROCEDURE — 63600175 PHARM REV CODE 636 W HCPCS: Performed by: STUDENT IN AN ORGANIZED HEALTH CARE EDUCATION/TRAINING PROGRAM

## 2023-12-20 PROCEDURE — 83036 HEMOGLOBIN GLYCOSYLATED A1C: CPT

## 2023-12-20 PROCEDURE — 63600175 PHARM REV CODE 636 W HCPCS: Mod: JG

## 2023-12-20 PROCEDURE — 27100171 HC OXYGEN HIGH FLOW UP TO 24 HOURS

## 2023-12-20 PROCEDURE — 61322 CRNEC/CRNOT DCMPRV W/O LOBEC: CPT | Mod: ,,, | Performed by: NEUROLOGICAL SURGERY

## 2023-12-20 PROCEDURE — 63600175 PHARM REV CODE 636 W HCPCS

## 2023-12-20 PROCEDURE — 51702 INSERT TEMP BLADDER CATH: CPT

## 2023-12-20 PROCEDURE — 36556 INSERT NON-TUNNEL CV CATH: CPT

## 2023-12-20 PROCEDURE — 86923 COMPATIBILITY TEST ELECTRIC: CPT

## 2023-12-20 PROCEDURE — 94002 VENT MGMT INPAT INIT DAY: CPT

## 2023-12-20 PROCEDURE — 85027 COMPLETE CBC AUTOMATED: CPT | Performed by: NEUROLOGICAL SURGERY

## 2023-12-20 PROCEDURE — 63600175 PHARM REV CODE 636 W HCPCS: Performed by: ANESTHESIOLOGY

## 2023-12-20 PROCEDURE — 99223 1ST HOSP IP/OBS HIGH 75: CPT | Mod: FS,57,, | Performed by: NEUROLOGICAL SURGERY

## 2023-12-20 PROCEDURE — 36555 INSERT NON-TUNNEL CV CATH: CPT

## 2023-12-20 PROCEDURE — D9220A PRA ANESTHESIA: Mod: CRNA,,, | Performed by: NURSE ANESTHETIST, CERTIFIED REGISTERED

## 2023-12-20 PROCEDURE — 63600175 PHARM REV CODE 636 W HCPCS: Performed by: INTERNAL MEDICINE

## 2023-12-20 PROCEDURE — 27800903 OPTIME MED/SURG SUP & DEVICES OTHER IMPLANTS: Performed by: NEUROLOGICAL SURGERY

## 2023-12-20 PROCEDURE — C1729 CATH, DRAINAGE: HCPCS | Performed by: NEUROLOGICAL SURGERY

## 2023-12-20 PROCEDURE — 86850 RBC ANTIBODY SCREEN: CPT

## 2023-12-20 PROCEDURE — D9220A PRA ANESTHESIA: Mod: ANES,,, | Performed by: ANESTHESIOLOGY

## 2023-12-20 PROCEDURE — D9220A PRA ANESTHESIA: ICD-10-PCS | Mod: CRNA,,, | Performed by: NURSE ANESTHETIST, CERTIFIED REGISTERED

## 2023-12-20 PROCEDURE — 36620 INSERTION CATHETER ARTERY: CPT

## 2023-12-20 PROCEDURE — 36000710: Performed by: NEUROLOGICAL SURGERY

## 2023-12-20 PROCEDURE — 25000003 PHARM REV CODE 250: Performed by: NEUROLOGICAL SURGERY

## 2023-12-20 PROCEDURE — 87075 CULTR BACTERIA EXCEPT BLOOD: CPT | Performed by: NEUROLOGICAL SURGERY

## 2023-12-20 PROCEDURE — 27201423 OPTIME MED/SURG SUP & DEVICES STERILE SUPPLY: Performed by: NEUROLOGICAL SURGERY

## 2023-12-20 PROCEDURE — 84481 FREE ASSAY (FT-3): CPT

## 2023-12-20 PROCEDURE — 84439 ASSAY OF FREE THYROXINE: CPT

## 2023-12-20 PROCEDURE — 84100 ASSAY OF PHOSPHORUS: CPT

## 2023-12-20 PROCEDURE — 83930 ASSAY OF BLOOD OSMOLALITY: CPT | Performed by: NURSE PRACTITIONER

## 2023-12-20 PROCEDURE — 63600175 PHARM REV CODE 636 W HCPCS: Performed by: REGISTERED NURSE

## 2023-12-20 PROCEDURE — 25000003 PHARM REV CODE 250: Performed by: INTERNAL MEDICINE

## 2023-12-20 PROCEDURE — C9248 INJ, CLEVIDIPINE BUTYRATE: HCPCS | Mod: JG

## 2023-12-20 PROCEDURE — 84443 ASSAY THYROID STIM HORMONE: CPT

## 2023-12-20 PROCEDURE — 27000513 HC SENSOR FLOTRAC

## 2023-12-20 PROCEDURE — 87070 CULTURE OTHR SPECIMN AEROBIC: CPT | Performed by: NEUROLOGICAL SURGERY

## 2023-12-20 PROCEDURE — 25000003 PHARM REV CODE 250: Performed by: ANESTHESIOLOGY

## 2023-12-20 PROCEDURE — 00U20KZ SUPPLEMENT DURA MATER WITH NONAUTOLOGOUS TISSUE SUBSTITUTE, OPEN APPROACH: ICD-10-PCS | Performed by: NEUROLOGICAL SURGERY

## 2023-12-20 PROCEDURE — 84295 ASSAY OF SERUM SODIUM: CPT | Performed by: NURSE PRACTITIONER

## 2023-12-20 PROCEDURE — 37000009 HC ANESTHESIA EA ADD 15 MINS: Performed by: NEUROLOGICAL SURGERY

## 2023-12-20 PROCEDURE — 20000000 HC ICU ROOM

## 2023-12-20 PROCEDURE — 02HV33Z INSERTION OF INFUSION DEVICE INTO SUPERIOR VENA CAVA, PERCUTANEOUS APPROACH: ICD-10-PCS | Performed by: INTERNAL MEDICINE

## 2023-12-20 PROCEDURE — C1751 CATH, INF, PER/CENT/MIDLINE: HCPCS

## 2023-12-20 PROCEDURE — 99900035 HC TECH TIME PER 15 MIN (STAT)

## 2023-12-20 PROCEDURE — 83735 ASSAY OF MAGNESIUM: CPT

## 2023-12-20 PROCEDURE — 85027 COMPLETE CBC AUTOMATED: CPT

## 2023-12-20 PROCEDURE — 99233 SBSQ HOSP IP/OBS HIGH 50: CPT | Mod: ,,, | Performed by: PSYCHIATRY & NEUROLOGY

## 2023-12-20 PROCEDURE — 99900026 HC AIRWAY MAINTENANCE (STAT)

## 2023-12-20 PROCEDURE — 25000003 PHARM REV CODE 250: Performed by: REGISTERED NURSE

## 2023-12-20 PROCEDURE — D9220A PRA ANESTHESIA: ICD-10-PCS | Mod: ANES,,, | Performed by: ANESTHESIOLOGY

## 2023-12-20 DEVICE — SEPRAFILM ADHESION BARRIER (MEMBRANE) IS A STERILE, BIORESORBABLE, TRANSLUCENT ADHESION BARRIER COMPOSED OF TWO ANIONIC POLYSACCHARIDES, SODIUM HYALURONATE (HA) AND CARBOXYMETHYLCELLULOSE (CMC).
Type: IMPLANTABLE DEVICE | Site: SCALP | Status: FUNCTIONAL
Brand: SEPRAFILM

## 2023-12-20 DEVICE — COLLAGEN DURA SUBSTITUTE MEMBRANE 5IN X 7IN (12.5CM X 17.5CM)
Type: IMPLANTABLE DEVICE | Site: SCALP | Status: FUNCTIONAL
Brand: DURAMATRIX ONLAY

## 2023-12-20 RX ORDER — ROCURONIUM BROMIDE 10 MG/ML
INJECTION, SOLUTION INTRAVENOUS
Status: DISCONTINUED | OUTPATIENT
Start: 2023-12-20 | End: 2023-12-20

## 2023-12-20 RX ORDER — DILTIAZEM HYDROCHLORIDE 5 MG/ML
10 INJECTION INTRAVENOUS ONCE
Status: COMPLETED | OUTPATIENT
Start: 2023-12-20 | End: 2023-12-20

## 2023-12-20 RX ORDER — LABETALOL HYDROCHLORIDE 5 MG/ML
10 INJECTION, SOLUTION INTRAVENOUS EVERY 4 HOURS PRN
Status: DISCONTINUED | OUTPATIENT
Start: 2023-12-20 | End: 2023-12-24

## 2023-12-20 RX ORDER — MAGNESIUM SULFATE HEPTAHYDRATE 40 MG/ML
2 INJECTION, SOLUTION INTRAVENOUS ONCE
Status: COMPLETED | OUTPATIENT
Start: 2023-12-20 | End: 2023-12-20

## 2023-12-20 RX ORDER — DEXMEDETOMIDINE HYDROCHLORIDE 4 UG/ML
0-1.4 INJECTION, SOLUTION INTRAVENOUS CONTINUOUS
Status: DISCONTINUED | OUTPATIENT
Start: 2023-12-20 | End: 2024-01-16 | Stop reason: HOSPADM

## 2023-12-20 RX ORDER — ONDANSETRON HYDROCHLORIDE 2 MG/ML
INJECTION, SOLUTION INTRAVENOUS
Status: COMPLETED
Start: 2023-12-20 | End: 2023-12-20

## 2023-12-20 RX ORDER — LEVETIRACETAM 10 MG/ML
1000 INJECTION INTRAVASCULAR EVERY 12 HOURS
Status: DISCONTINUED | OUTPATIENT
Start: 2023-12-20 | End: 2024-01-06

## 2023-12-20 RX ORDER — INSULIN ASPART 100 [IU]/ML
0-5 INJECTION, SOLUTION INTRAVENOUS; SUBCUTANEOUS EVERY 6 HOURS PRN
Status: DISCONTINUED | OUTPATIENT
Start: 2023-12-20 | End: 2024-01-16 | Stop reason: HOSPADM

## 2023-12-20 RX ORDER — 3% SODIUM CHLORIDE 3 G/100ML
30 INJECTION, SOLUTION INTRAVENOUS CONTINUOUS
Status: DISCONTINUED | OUTPATIENT
Start: 2023-12-20 | End: 2023-12-22

## 2023-12-20 RX ORDER — HYDRALAZINE HYDROCHLORIDE 20 MG/ML
10 INJECTION INTRAMUSCULAR; INTRAVENOUS EVERY 4 HOURS PRN
Status: DISCONTINUED | OUTPATIENT
Start: 2023-12-20 | End: 2023-12-24

## 2023-12-20 RX ORDER — POTASSIUM CHLORIDE 7.45 MG/ML
10 INJECTION INTRAVENOUS
Status: DISPENSED | OUTPATIENT
Start: 2023-12-20 | End: 2023-12-20

## 2023-12-20 RX ORDER — FENTANYL CITRATE 50 UG/ML
INJECTION, SOLUTION INTRAMUSCULAR; INTRAVENOUS
Status: DISCONTINUED | OUTPATIENT
Start: 2023-12-20 | End: 2023-12-20

## 2023-12-20 RX ORDER — NOREPINEPHRINE BITARTRATE/D5W 8 MG/250ML
0-3 PLASTIC BAG, INJECTION (ML) INTRAVENOUS CONTINUOUS
Status: DISCONTINUED | OUTPATIENT
Start: 2023-12-20 | End: 2024-01-16 | Stop reason: HOSPADM

## 2023-12-20 RX ORDER — MANNITOL 250 MG/ML
75 INJECTION, SOLUTION INTRAVENOUS ONCE
Status: COMPLETED | OUTPATIENT
Start: 2023-12-20 | End: 2023-12-20

## 2023-12-20 RX ORDER — PROPOFOL 10 MG/ML
0-50 INJECTION, EMULSION INTRAVENOUS CONTINUOUS
Status: DISCONTINUED | OUTPATIENT
Start: 2023-12-20 | End: 2023-12-28

## 2023-12-20 RX ORDER — NOREPINEPHRINE BITARTRATE/D5W 8 MG/250ML
PLASTIC BAG, INJECTION (ML) INTRAVENOUS
Status: COMPLETED
Start: 2023-12-20 | End: 2023-12-20

## 2023-12-20 RX ORDER — ONDANSETRON HYDROCHLORIDE 2 MG/ML
8 INJECTION, SOLUTION INTRAVENOUS EVERY 6 HOURS PRN
Status: DISCONTINUED | OUTPATIENT
Start: 2023-12-20 | End: 2024-01-16 | Stop reason: HOSPADM

## 2023-12-20 RX ORDER — DEXMEDETOMIDINE HYDROCHLORIDE 4 UG/ML
INJECTION, SOLUTION INTRAVENOUS
Status: COMPLETED
Start: 2023-12-20 | End: 2023-12-20

## 2023-12-20 RX ORDER — HYDROCODONE BITARTRATE AND ACETAMINOPHEN 500; 5 MG/1; MG/1
TABLET ORAL
Status: DISCONTINUED | OUTPATIENT
Start: 2023-12-20 | End: 2024-01-16 | Stop reason: HOSPADM

## 2023-12-20 RX ORDER — BACITRACIN 500 [USP'U]/G
OINTMENT TOPICAL
Status: DISCONTINUED | OUTPATIENT
Start: 2023-12-20 | End: 2023-12-20 | Stop reason: HOSPADM

## 2023-12-20 RX ORDER — DILTIAZEM HYDROCHLORIDE 5 MG/ML
10 INJECTION INTRAVENOUS ONCE
Status: DISCONTINUED | OUTPATIENT
Start: 2023-12-20 | End: 2023-12-22

## 2023-12-20 RX ORDER — MANNITOL 20 G/100ML
75 INJECTION, SOLUTION INTRAVENOUS ONCE
Status: DISCONTINUED | OUTPATIENT
Start: 2023-12-20 | End: 2024-01-16 | Stop reason: HOSPADM

## 2023-12-20 RX ORDER — MANNITOL 20 G/100ML
75 INJECTION, SOLUTION INTRAVENOUS ONCE
Status: DISCONTINUED | OUTPATIENT
Start: 2023-12-20 | End: 2023-12-20

## 2023-12-20 RX ORDER — ETOMIDATE 2 MG/ML
20 INJECTION INTRAVENOUS ONCE
Status: COMPLETED | OUTPATIENT
Start: 2023-12-20 | End: 2023-12-20

## 2023-12-20 RX ORDER — ROCURONIUM BROMIDE 10 MG/ML
100 INJECTION, SOLUTION INTRAVENOUS ONCE
Status: COMPLETED | OUTPATIENT
Start: 2023-12-20 | End: 2023-12-20

## 2023-12-20 RX ORDER — PROPOFOL 10 MG/ML
VIAL (ML) INTRAVENOUS
Status: DISCONTINUED | OUTPATIENT
Start: 2023-12-20 | End: 2023-12-20

## 2023-12-20 RX ORDER — MIDAZOLAM HYDROCHLORIDE 1 MG/ML
INJECTION INTRAMUSCULAR; INTRAVENOUS
Status: DISCONTINUED | OUTPATIENT
Start: 2023-12-20 | End: 2023-12-20

## 2023-12-20 RX ORDER — FENTANYL CITRATE 50 UG/ML
50 INJECTION, SOLUTION INTRAMUSCULAR; INTRAVENOUS
Status: DISCONTINUED | OUTPATIENT
Start: 2023-12-20 | End: 2024-01-16 | Stop reason: HOSPADM

## 2023-12-20 RX ORDER — GLUCAGON 1 MG
1 KIT INJECTION
Status: DISCONTINUED | OUTPATIENT
Start: 2023-12-20 | End: 2024-01-16 | Stop reason: HOSPADM

## 2023-12-20 RX ORDER — LIDOCAINE HYDROCHLORIDE AND EPINEPHRINE 5; 5 MG/ML; UG/ML
INJECTION, SOLUTION INFILTRATION; PERINEURAL
Status: DISCONTINUED | OUTPATIENT
Start: 2023-12-20 | End: 2023-12-20 | Stop reason: HOSPADM

## 2023-12-20 RX ORDER — CEFAZOLIN SODIUM 1 G/3ML
INJECTION, POWDER, FOR SOLUTION INTRAMUSCULAR; INTRAVENOUS
Status: DISCONTINUED | OUTPATIENT
Start: 2023-12-20 | End: 2023-12-20

## 2023-12-20 RX ORDER — CEFAZOLIN SODIUM 1 G/3ML
INJECTION, POWDER, FOR SOLUTION INTRAMUSCULAR; INTRAVENOUS
Status: DISCONTINUED | OUTPATIENT
Start: 2023-12-20 | End: 2023-12-20 | Stop reason: HOSPADM

## 2023-12-20 RX ORDER — MANNITOL 250 MG/ML
25 INJECTION, SOLUTION INTRAVENOUS ONCE
Status: COMPLETED | OUTPATIENT
Start: 2023-12-20 | End: 2023-12-20

## 2023-12-20 RX ADMIN — ROCURONIUM BROMIDE 20 MG: 10 SOLUTION INTRAVENOUS at 05:12

## 2023-12-20 RX ADMIN — NOREPINEPHRINE BITARTRATE 0.02 MCG/KG/MIN: 8 INJECTION, SOLUTION INTRAVENOUS at 01:12

## 2023-12-20 RX ADMIN — PROPOFOL 30 MG: 10 INJECTION, EMULSION INTRAVENOUS at 06:12

## 2023-12-20 RX ADMIN — POTASSIUM CHLORIDE 10 MEQ: 7.46 INJECTION, SOLUTION INTRAVENOUS at 03:12

## 2023-12-20 RX ADMIN — INSULIN ASPART 2 UNITS: 100 INJECTION, SOLUTION INTRAVENOUS; SUBCUTANEOUS at 06:12

## 2023-12-20 RX ADMIN — LEVETIRACETAM INJECTION 1000 MG: 10 INJECTION INTRAVENOUS at 09:12

## 2023-12-20 RX ADMIN — DEXMEDETOMIDINE HYDROCHLORIDE 0.2 MCG/KG/HR: 4 INJECTION INTRAVENOUS at 08:12

## 2023-12-20 RX ADMIN — CLEVIPIDINE 7.5 MG/HR: 0.5 EMULSION INTRAVENOUS at 11:12

## 2023-12-20 RX ADMIN — SODIUM CHLORIDE, SODIUM GLUCONATE, SODIUM ACETATE, POTASSIUM CHLORIDE AND MAGNESIUM CHLORIDE: 526; 502; 368; 37; 30 INJECTION, SOLUTION INTRAVENOUS at 04:12

## 2023-12-20 RX ADMIN — ONDANSETRON 8 MG: 2 INJECTION INTRAMUSCULAR; INTRAVENOUS at 12:12

## 2023-12-20 RX ADMIN — Medication 2750 UNITS: at 03:12

## 2023-12-20 RX ADMIN — MAGNESIUM SULFATE HEPTAHYDRATE 2 G: 40 INJECTION, SOLUTION INTRAVENOUS at 01:12

## 2023-12-20 RX ADMIN — SODIUM CHLORIDE: 9 INJECTION, SOLUTION INTRAVENOUS at 01:12

## 2023-12-20 RX ADMIN — FENTANYL CITRATE 50 MCG: 50 INJECTION, SOLUTION INTRAMUSCULAR; INTRAVENOUS at 05:12

## 2023-12-20 RX ADMIN — METOROPROLOL TARTRATE 5 MG: 5 INJECTION, SOLUTION INTRAVENOUS at 09:12

## 2023-12-20 RX ADMIN — ASPIRIN 300 MG: 300 SUPPOSITORY RECTAL at 09:12

## 2023-12-20 RX ADMIN — ROCURONIUM BROMIDE 100 MG: 10 INJECTION, SOLUTION INTRAVENOUS at 09:12

## 2023-12-20 RX ADMIN — POTASSIUM CHLORIDE 10 MEQ: 7.46 INJECTION, SOLUTION INTRAVENOUS at 01:12

## 2023-12-20 RX ADMIN — LABETALOL HYDROCHLORIDE 10 MG: 5 INJECTION, SOLUTION INTRAVENOUS at 10:12

## 2023-12-20 RX ADMIN — LABETALOL HYDROCHLORIDE 10 MG: 5 INJECTION, SOLUTION INTRAVENOUS at 01:12

## 2023-12-20 RX ADMIN — SODIUM CHLORIDE 60 ML/HR: 3 INJECTION, SOLUTION INTRAVENOUS at 10:12

## 2023-12-20 RX ADMIN — ROCURONIUM BROMIDE 20 MG: 10 SOLUTION INTRAVENOUS at 04:12

## 2023-12-20 RX ADMIN — SODIUM CHLORIDE: 9 INJECTION, SOLUTION INTRAVENOUS at 04:12

## 2023-12-20 RX ADMIN — MUPIROCIN: 20 OINTMENT TOPICAL at 09:12

## 2023-12-20 RX ADMIN — FENTANYL CITRATE 50 MCG: 50 INJECTION, SOLUTION INTRAMUSCULAR; INTRAVENOUS at 04:12

## 2023-12-20 RX ADMIN — DEXMEDETOMIDINE HYDROCHLORIDE 0.2 MCG/KG/HR: 400 INJECTION INTRAVENOUS at 11:12

## 2023-12-20 RX ADMIN — HYDRALAZINE HYDROCHLORIDE 10 MG: 20 INJECTION INTRAMUSCULAR; INTRAVENOUS at 02:12

## 2023-12-20 RX ADMIN — CLEVIPIDINE 10 MG/HR: 0.5 EMULSION INTRAVENOUS at 11:12

## 2023-12-20 RX ADMIN — DILTIAZEM HYDROCHLORIDE 10 MG/HR: 5 INJECTION INTRAVENOUS at 11:12

## 2023-12-20 RX ADMIN — SODIUM CHLORIDE: 3 INJECTION, SOLUTION INTRAVENOUS at 05:12

## 2023-12-20 RX ADMIN — MANNITOL 75 G: 250 INJECTION, SOLUTION INTRAVENOUS at 11:12

## 2023-12-20 RX ADMIN — CEFAZOLIN 3 G: 330 INJECTION, POWDER, FOR SOLUTION INTRAMUSCULAR; INTRAVENOUS at 04:12

## 2023-12-20 RX ADMIN — DEXMEDETOMIDINE HYDROCHLORIDE 0.2 MCG/KG/HR: 4 INJECTION INTRAVENOUS at 11:12

## 2023-12-20 RX ADMIN — DILTIAZEM HYDROCHLORIDE 10 MG: 5 INJECTION, SOLUTION INTRAVENOUS at 12:12

## 2023-12-20 RX ADMIN — NOREPINEPHRINE BITARTRATE 0.06 MCG/KG/MIN: 8 INJECTION, SOLUTION INTRAVENOUS at 07:12

## 2023-12-20 RX ADMIN — ETOMIDATE 20 MG: 2 INJECTION INTRAVENOUS at 09:12

## 2023-12-20 RX ADMIN — MIDAZOLAM HYDROCHLORIDE 2 MG: 1 INJECTION, SOLUTION INTRAMUSCULAR; INTRAVENOUS at 04:12

## 2023-12-20 RX ADMIN — PROPOFOL 100 MG: 10 INJECTION, EMULSION INTRAVENOUS at 04:12

## 2023-12-20 RX ADMIN — DILTIAZEM HYDROCHLORIDE 2.5 MG/HR: 5 INJECTION INTRAVENOUS at 01:12

## 2023-12-20 RX ADMIN — PROPOFOL 25 MCG/KG/MIN: 10 INJECTION, EMULSION INTRAVENOUS at 09:12

## 2023-12-20 RX ADMIN — ROCURONIUM BROMIDE 50 MG: 10 SOLUTION INTRAVENOUS at 04:12

## 2023-12-20 RX ADMIN — LEVETIRACETAM INJECTION 1000 MG: 10 INJECTION INTRAVENOUS at 08:12

## 2023-12-20 NOTE — ANESTHESIA PREPROCEDURE EVALUATION
12/20/2023  Delio Daniel Jr. is a 68 y.o., male.      Pre-op Assessment    I have reviewed the Patient Summary Reports.     I have reviewed the Nursing Notes. I have reviewed the NPO Status.   I have reviewed the Medications.     Review of Systems  Anesthesia Hx:  No problems with previous Anesthesia                Hematology/Oncology:  Hematology Normal   Oncology Normal                                   EENT/Dental:  EENT/Dental Normal           Cardiovascular:    Pacemaker Hypertension  Past MI CAD    Dysrhythmias atrial fibrillation                                   Pulmonary:  Pulmonary Normal                       Renal/:  Chronic Renal Disease, CKD                Hepatic/GI:      Liver Disease,            Musculoskeletal:  Musculoskeletal Normal                Neurological:   CVA                                    Endocrine:  Endocrine Normal            Psych:  Psychiatric Normal                  Physical Exam  General: Well nourished and Unconscious    Airway:  Mallampati: unable to assess   Pre-Existing Airway: Oral Endotracheal tube    Chest/Lungs:  Clear to auscultation    Heart:  Rate: Normal    Anesthesia Plan  Type of Anesthesia, risks & benefits discussed:    Anesthesia Type: Gen ETT  Intra-op Monitoring Plan: Standard ASA Monitors and Art Line  Post Op Pain Control Plan: multimodal analgesia  Induction:  Inhalation and IV  Airway Plan: Direct  Informed Consent: Informed consent signed with the Patient representative and all parties understand the risks and agree with anesthesia plan.  All questions answered.   ASA Score: 4 Emergent  Day of Surgery Review of History & Physical: H&P Update referred to the surgeon/provider.  Anesthesia Plan Notes: Intubated on vent after CVA yesterday/ IR Thrombectomy/ large midline shift with MS changes leading to intubation.    Ready For Surgery From  Anesthesia Perspective.   .

## 2023-12-20 NOTE — PLAN OF CARE
Problem: Adult Inpatient Plan of Care  Goal: Plan of Care Review  Outcome: Ongoing, Progressing  Goal: Patient-Specific Goal (Individualized)  Outcome: Ongoing, Progressing  Goal: Absence of Hospital-Acquired Illness or Injury  Outcome: Ongoing, Progressing  Goal: Optimal Comfort and Wellbeing  Outcome: Ongoing, Progressing  Goal: Readiness for Transition of Care  Outcome: Ongoing, Progressing     Problem: Skin Injury Risk Increased  Goal: Skin Health and Integrity  Outcome: Ongoing, Progressing     Problem: Adjustment to Illness (Stroke, Ischemic/Transient Ischemic Attack)  Goal: Optimal Coping  Outcome: Ongoing, Progressing     Problem: Bowel Elimination Impaired (Stroke, Ischemic/Transient Ischemic Attack)  Goal: Effective Bowel Elimination  Outcome: Ongoing, Progressing     Problem: Cerebral Tissue Perfusion (Stroke, Ischemic/Transient Ischemic Attack)  Goal: Optimal Cerebral Tissue Perfusion  Outcome: Ongoing, Progressing     Problem: Cognitive Impairment (Stroke, Ischemic/Transient Ischemic Attack)  Goal: Optimal Cognitive Function  Outcome: Ongoing, Progressing     Problem: Communication Impairment (Stroke, Ischemic/Transient Ischemic Attack)  Goal: Improved Communication Skills  Outcome: Ongoing, Progressing     Problem: Functional Ability Impaired (Stroke, Ischemic/Transient Ischemic Attack)  Goal: Optimal Functional Ability  Outcome: Ongoing, Progressing     Problem: Respiratory Compromise (Stroke, Ischemic/Transient Ischemic Attack)  Goal: Effective Oxygenation and Ventilation  Outcome: Ongoing, Progressing     Problem: Sensorimotor Impairment (Stroke, Ischemic/Transient Ischemic Attack)  Goal: Improved Sensorimotor Function  Outcome: Ongoing, Progressing     Problem: Swallowing Impairment (Stroke, Ischemic/Transient Ischemic Attack)  Goal: Optimal Eating and Swallowing without Aspiration  Outcome: Ongoing, Progressing     Problem: Urinary Elimination Impaired (Stroke, Ischemic/Transient Ischemic  Attack)  Goal: Effective Urinary Elimination  Outcome: Ongoing, Progressing     Problem: Fall Injury Risk  Goal: Absence of Fall and Fall-Related Injury  Outcome: Ongoing, Progressing     Problem: Infection  Goal: Absence of Infection Signs and Symptoms  Outcome: Ongoing, Progressing

## 2023-12-20 NOTE — PROGRESS NOTES
Care update:    After osmotic therapy initiated earlier today patient became more responsive with following commands to the right upper and lower extremity.  Pupils became equal and reactive again.  Left upper extremity reaching towards ET tube during suctioning.  After family observed patient's improvement they have decided they would like to be as aggressive as possible if this includes surgical intervention they would be agreeable.    3% hypertonic infusing.  Mannitol was given earlier.    Patient is scheduled for right craniectomy.    Kcentra has been ordered stat and is infusing.    Type and cross performed   Consent has been signed by daughter and is on the chart.  Anesthesia has come by to consent as well.      Daughter has been fully updated and will be waiting in designated area.  I answered all of her questions in a manner in which I felt she easily understood.

## 2023-12-20 NOTE — CONSULTS
[facility] [address]  Neurosurgery  Consult Note    Consults  Subjective:     Chief Complaint/Reason for Admission:  Right MCA with increasing mass effect and 1.6 cm right-to-left midline shift.    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  Patient presented to the ED with complaints of left facial droop, slurred speech left-sided hemiparesis neglect.  Initial NIH 18.  Neurology noted complete left-sided neglect upon assessment with right gaze deviation.  Initial CT head 12/19/2023 with no acute intracranial findings.  Right ICA thrombectomy performed 12/19/2023 with Dr. Randle.  CT head without contrast performed this morning with increasing hypodensity and edema throughout the distribution of the right MCA.  Increasing mass effect with 1.6 cm right-to-left midline shift.  Complete compression of right lateral ventricle.    Nursing reported patient was still oriented and able to speak although garbled up until about 830 this morning.  Patient also had pupil change as well as altered mental status that prompted the latest CT.    At the time of exam the patient is being intubated, arterial lines being inserted.  Patient is hypertensive in the 200s.  The neuro JESSICA is here as well.  Mannitol was initiated.    An additional 75 g of mannitol was added to the 25 g ordered by Neurology.  3% bolus and drip initiated.  Patient was given rocuronium so unfortunately no exam is obtainable at this time.  Family unavailable at this time but nursing attempting to contact a daughter that is a nurse at [employer].  Apparently he has 8 or 9 children but she will be the point of  contact.    Most recent sodium 137.  PT INR 16.9 and 1.4.  Platelets 225.    PTA Medications   Medication Sig    aspirin 81 MG Chew chew and swallow 1 tablet by mouth daily    atorvastatin (LIPITOR) 40 MG tablet Take 1 tablet (40 mg total) by mouth once daily.    diltiaZEM (CARDIZEM CD) 360 MG 24 hr capsule Take 1 capsule (360 mg total) by mouth once daily.    losartan (COZAAR) 50 MG tablet Take 1 tablet (50 mg total) by mouth once daily.    metoprolol succinate (TOPROL-XL) 200 MG 24 hr tablet Take 1 tablet (200 mg total) by mouth 2 (two) times daily.    omeprazole (PRILOSEC) 20 MG capsule Take 1 capsule (20 mg total) by mouth once daily. One tab by mouth daily    potassium chloride (KLOR-CON) 20 mEq Pack Take 20 mEq by mouth once daily.    spironolactone (ALDACTONE) 50 MG tablet Take 1 tablet (50 mg total) by mouth once daily.    torsemide (DEMADEX) 10 MG Tab Take 1 tablet (10 mg total) by mouth once daily.    vitamin D (VITAMIN D3) 1000 units Tab Take 1 tablet (1,000 Units total) by mouth once daily.    XARELTO 20 mg Tab TAKE 1 TABLET BY MOUTH DAILY with SUPPER    albuterol (PROVENTIL/VENTOLIN HFA) 90 mcg/actuation inhaler Inhale 2 puffs into the lungs every 6 (six) hours as needed for Wheezing. Rescue (Patient not taking: Reported on 8/22/2023)    cetirizine (ZYRTEC) 10 MG tablet Take 1 tablet (10 mg total) by mouth once daily. for 14 days       Review of patient's allergies indicates:  No Known Allergies    Past Medical History:   Diagnosis Date    Arthritis     Atrial fibrillation     BPH (benign prostatic hyperplasia)     Cardiac arrest     Coronary artery disease     Cyst, kidney, acquired     Diverticulosis     Hyperlipidemia     Hypertension     MI (myocardial infarction)     Obesity     Steatosis of liver      Past Surgical History:   Procedure Laterality Date    A-V CARDIAC PACEMAKER INSERTION Right     CARDIAC CATHETERIZATION      COLONOSCOPY W/ BIOPSIES      excision of colon       Family History        Problem Relation (Age of Onset)    Hypertension Mother, Father, Sister          Tobacco Use    Smoking status: Former    Smokeless tobacco: Never   Substance and Sexual Activity    Alcohol use: Not Currently    Drug use: Not Currently    Sexual activity: Not Currently     Partners: Female     Review of Systems   Unable to perform ROS: Mental status change     Objective:     Weight: 110 kg (242 lb 8.1 oz)  Body mass index is 33.82 kg/m².  Vital Signs (Most Recent):  Temp: 98.3 °F (36.8 °C) (12/20/23 0400)  Pulse: 92 (12/20/23 0600)  Resp: 16 (12/20/23 0600)  BP: (!) 141/86 (12/20/23 0600)  SpO2: 98 % (12/20/23 0600) Vital Signs (24h Range):  Temp:  [96.8 °F (36 °C)-98.3 °F (36.8 °C)] 98.3 °F (36.8 °C)  Pulse:  [] 92  Resp:  [10-27] 16  SpO2:  [94 %-100 %] 98 %  BP: (103-190)/() 141/86                Oxygen Concentration (%):  [21] 21             Physical Exam:  Nursing note and vitals reviewed.    Constitutional: He appears well-developed. He is not diaphoretic. No distress.     Cardiovascular: Normal rate.   Patient is hypertensive with blood pressure in the 200s.     Neurological:   Exam is extremely limited at this time due to acuity of situation.    Patient recently given rocuronium and sedation for intubation.    Current GCS is 3 T  Intubated, mechanically ventilated.         Significant Labs:  Recent Labs   Lab 12/19/23  1132 12/19/23  2253 12/20/23  0754    140 137   K 4.4 3.7 3.8   CO2 25 20* 22*   BUN 13.5 12.0 10.4   CREATININE 1.32* 0.99 0.94   CALCIUM 9.1 8.4* 8.5*   MG  --  1.90 2.10     Recent Labs   Lab 12/19/23  1055 12/19/23  1145 12/20/23  0754   WBC  --  9.10 25.65  25.65*   HGB  --  16.0 14.8   HCT 51 48.4 43.8   PLT  --  228 225     Recent Labs   Lab 12/19/23  1132   INR 1.4*     Microbiology Results (last 7 days)       ** No results found for the last 168 hours. **          Assessment/Plan:    Add an additional 75 g of mannitol to the previous 25 g that was ordered.    3%  hypertonic saline bolus administered.    3% hypertonic saline at 60 mL/hour   Sodium goal 155   Every 6 hour serum sodium and Osmo.  Hourly neurological exams  Seizure precautions Keppra   Blood pressure less than 140/90-Cleviprex initiated    Patient has 8 or 9 children.  A daughter that is a nurse at [employer] is being contacted and will be coming out to the hospital.  Patient's last Xarelto dosage was about 2 days ago.  Updated coags ordered.  NPO         Active Diagnoses:    Diagnosis Date Noted POA    Stroke [I63.9] 12/19/2023 Yes      Problems Resolved During this Admission:       Thank you for your consult. I will follow-up with patient. Please contact us if you have any additional questions.    BLAIR Bey-BC  Neurosurgery  [facility]

## 2023-12-20 NOTE — PROGRESS NOTES
[facility]  Pulmonary Critical Care Note    Patient Name: Delio Daniel Jr.  MRN: [id number]  Admission Date: 12/19/2023  Hospital Length of Stay: 1 days  Code Status: Full Code  Attending Provider: Italo Orozco MD  Primary Care Provider: Candy, Primary Doctor     Subjective:     HPI:   Pt is a 69 yo M with PMH of Afib (on Xarelto), HTN, CAD, CAD (STEMI 2003), HFpEF(55%), PM placement in 2017 for 2nd degree AVB replaced with dcICD 5/2018 for Vfib arrest in 3/2018 d/t prolonged QT and hypokalemia ; BPH, fatty liver, and neuroendocrine carcinoma of small bowel s/p resection 2018; [facility] to [facility] on 12/19 with complaints of L facial droop, slurred speech, L sided hemiparesis, and fixed R sided gaze. His last known normal was 9:15 per EMS. Stroke protocol in ED initiated. Unofficial CT head showed possible dense R MCA. Pt taken to cath lab for BRAD thrombectomy. Admitted to ICU for post-operative care and monitoring.     Hospital Course/Significant events:  12/19/2023 right internal carotid artery thrombectomy    24 Hour Interval History:  Patient had an episode of vomiting overnight.  Patient had sustained episodes of AFib RVR.  He received 3 doses metoprolol 5 mg 10 mg of Cardizem.  Patient is now currently on Cardizem drip    Past Medical History:   Diagnosis Date    Arthritis     Atrial fibrillation     BPH (benign prostatic hyperplasia)     Cardiac arrest     Coronary artery disease     Cyst, kidney, acquired     Diverticulosis     Hyperlipidemia     Hypertension     MI (myocardial infarction)     Obesity     Steatosis of liver        Past Surgical History:   Procedure Laterality Date    A-V CARDIAC PACEMAKER INSERTION Right     CARDIAC CATHETERIZATION      COLONOSCOPY W/ BIOPSIES      excision of colon         Social History     Socioeconomic History    Marital status:     Number of children: 9   Occupational History    Occupation: retired   Tobacco Use    Smoking status: Former     Smokeless tobacco: Never   Substance and Sexual Activity    Alcohol use: Not Currently    Drug use: Not Currently    Sexual activity: Not Currently     Partners: Female     Social Determinants of Health     Financial Resource Strain: Low Risk  (10/19/2022)    Overall Financial Resource Strain (CARDIA)     Difficulty of Paying Living Expenses: Not hard at all   Food Insecurity: No Food Insecurity (10/19/2022)    Hunger Vital Sign     Worried About Running Out of Food in the Last Year: Never true     Ran Out of Food in the Last Year: Never true   Transportation Needs: No Transportation Needs (10/19/2022)    PRAPARE - Transportation     Lack of Transportation (Medical): No     Lack of Transportation (Non-Medical): No   Physical Activity: Sufficiently Active (10/19/2022)    Exercise Vital Sign     Days of Exercise per Week: 6 days     Minutes of Exercise per Session: 60 min   Stress: No Stress Concern Present (10/19/2022)    [employer] - Occupational Stress Questionnaire     Feeling of Stress : Not at all   Social Connections: Unknown (10/19/2022)    Social Connection and Isolation Panel [NHANES]     Frequency of Communication with Friends and Family: More than three times a week     Frequency of Social Gatherings with Friends and Family: More than three times a week     Attends Yazdanism Services: More than 4 times per year     Active Member of Clubs or Organizations: No     Attends Club or Organization Meetings: Never   Housing Stability: Low Risk  (10/19/2022)    Housing Stability Vital Sign     Unable to Pay for Housing in the Last Year: No     Number of Places Lived in the Last Year: 1     Unstable Housing in the Last Year: No           Current Outpatient Medications   Medication Instructions    albuterol (PROVENTIL/VENTOLIN HFA) 90 mcg/actuation inhaler 2 puffs, Inhalation, Every 6 hours PRN, Rescue    aspirin 81 MG Chew chew and swallow 1 tablet by mouth daily    atorvastatin  (LIPITOR) 40 mg, Oral, Daily    cetirizine (ZYRTEC) 10 mg, Oral, Daily    diltiaZEM (CARDIZEM CD) 360 mg, Oral, Daily    losartan (COZAAR) 50 mg, Oral, Daily    metoprolol succinate (TOPROL-XL) 200 mg, Oral, 2 times daily    omeprazole (PRILOSEC) 20 mg, Oral, Daily, One tab by mouth daily    potassium chloride (KLOR-CON) 20 mEq Pack 20 mEq, Oral, Daily    spironolactone (ALDACTONE) 50 mg, Oral, Daily    torsemide (DEMADEX) 10 mg, Oral, Daily    vitamin D (VITAMIN D3) 1,000 Units, Oral, Daily    XARELTO 20 mg Tab TAKE 1 TABLET BY MOUTH DAILY with SUPPER       Current Inpatient Medications   aspirin  300 mg Rectal Daily    diltiaZEM  10 mg Intravenous Once    mupirocin   Nasal BID    potassium chloride  10 mEq Intravenous Q1H       Current Intravenous Infusions   sodium chloride 0.9% 75 mL/hr at 12/20/23 0415    clevidipine      dilTIAZem 2.5 mg/hr (12/20/23 0104)         ROS       Objective:       Intake/Output Summary (Last 24 hours) at 12/20/2023 0452  Last data filed at 12/20/2023 0403  Gross per 24 hour   Intake 500 ml   Output 700 ml   Net -200 ml         Vital Signs (Most Recent):  Temp: 98.3 °F (36.8 °C) (12/20/23 0400)  Pulse: 110 (12/20/23 0430)  Resp: (!) 24 (12/20/23 0430)  BP: (!) 160/106 (12/20/23 0430)  SpO2: 99 % (12/20/23 0430)  Body mass index is 33.82 kg/m².  Weight: 110 kg (242 lb 8.1 oz) Vital Signs (24h Range):  Temp:  [96.8 °F (36 °C)-98.3 °F (36.8 °C)] 98.3 °F (36.8 °C)  Pulse:  [] 110  Resp:  [10-27] 24  SpO2:  [94 %-100 %] 99 %  BP: (103-190)/() 160/106     Physical Exam  General:  no acute respiratory distress  Head: Normocephalic, atraumatic  Eyes: PERRL, EOMI, anicteric sclera  Neck: supple, normal ROM, no thyromegaly   CVS:  Irregularly irregular Resp:  Lungs clear to auscultation bilaterally, no wheezes, rales, or rhonci  GI:  Abdomen soft, non-tender, non-distended, normoactive bowel sounds  MSK:  Unable to move left upper and lower extremities.  Onychomycosis  bilaterally  Skin:  No rashes, ulcers, erythema  Neuro:  Pupillary reflexes intact.  Left-sided facial drooping.  Is able to follow commands.  Able to move left upper or lower extremities.  Aphasic      Lines/Drains/Airways       Drain  Duration             Male External Urinary Catheter 12/19/23 1 day              Peripheral Intravenous Line  Duration                  Peripheral IV - Single Lumen 12/19/23 1045 20 G Lateral;Left Breast <1 day         Peripheral IV - Single Lumen 12/19/23 1050 18 G Anterior;Distal;Left Upper Arm <1 day         Peripheral IV - Single Lumen 12/19/23 1053 20 G Right Antecubital <1 day         Sheath 12/19/23 1345 Right anterior;proximal <1 day                    Significant Labs:    Lab Results   Component Value Date    WBC 9.10 12/19/2023    HGB 16.0 12/19/2023    HCT 48.4 12/19/2023    MCV 89.5 12/19/2023     12/19/2023         BMP  Lab Results   Component Value Date     12/19/2023    K 3.7 12/19/2023    CHLORIDE 109 (H) 12/19/2023    CO2 20 (L) 12/19/2023    BUN 12.0 12/19/2023    CREATININE 0.99 12/19/2023    CALCIUM 8.4 (L) 12/19/2023    AGAP 11.0 12/19/2023    EGFRNONAA 61 04/23/2022       ABG  Recent Labs   Lab 12/19/23  1547   PH 7.350   PO2 76.0*   PCO2 46.0*   HCO3 25.4       Mechanical Ventilation Support:  Oxygen Concentration (%): 21 (12/19/23 1618)    Significant Imaging:  I have reviewed the pertinent imaging within the past 24 hours.        Assessment/Plan:     Assessment  CVA s/p right ICA and MCA M3  branch thrombectomy  Atrial fibrillation  Onychomycosis  CAD  Hypertension  Hyperlipidemia  Elevated TSH  ADA      Plan  -q.1h neuro checks  -patient is currently on Cardizem drip for Afib  -echocardiogram EF 50-55% without intracardiac shunt  -replete potassium and magnesium.  Keep magnesium greater than 2  -awaiting for Neurology okay for anticoagulation  -follow up T3 and T4  -continue CPAP when sleeping    DVT Prophylaxis:  SCDs  GI Prophylaxis:  None      32 minutes of critical care was time spent personally by me on the following activities: development of treatment plan with patient or surrogate and bedside caregivers, discussions with consultants, evaluation of patient's response to treatment, examination of patient, ordering and performing treatments and interventions, ordering and review of laboratory studies, ordering and review of radiographic studies, pulse oximetry, re-evaluation of patient's condition.  This critical care time did not overlap with that of any other provider or involve time for any procedures.     Eleazar Westbrook MD  Pulmonary Critical Care Medicine  [facility]

## 2023-12-20 NOTE — SUBJECTIVE & OBJECTIVE
Subjective:     Interval History: RN reported neurological change. Patient became somnolent. He was following commands and answering questions appropriately at 6:30 am. Stat CT head revealing for Increasing hypodensity and edema throughout the distribution of the right MCA. There is increasing mass effect with 1 point 6 cm of right to left midline shift. There is complete compression of the right lateral ventricle.    Current Neurological Medications:     Current Facility-Administered Medications   Medication Dose Route Frequency Provider Last Rate Last Admin    0.9%  NaCl infusion   Intravenous Continuous Mary, Ijeoma B,  mL/hr at 12/20/23 0613 Rate Verify at 12/20/23 0613    aspirin suppository 300 mg  300 mg Rectal Daily Mary, Ijeoma B, NP   300 mg at 12/20/23 0905    clevidipine (CLEVIPREX) 25 mg/50 mL infusion  0-32 mg/hr Intravenous Continuous Stroda, Patrick, DO        dextrose 10% bolus 125 mL 125 mL  12.5 g Intravenous PRN Eleazar Westbrook MD        dextrose 10% bolus 250 mL 250 mL  25 g Intravenous PRN Eleazar Westbrook MD        diltiaZEM 125 mg in dextrose 5 % 125 mL IVPB (ready to mix) (titrating)  0-15 mg/hr Intravenous Continuous Stroda, Patrick, DO 10 mL/hr at 12/20/23 0613 10 mg/hr at 12/20/23 0613    diltiaZEM injection 10 mg  10 mg Intravenous Once Eleazar Westbrook MD        glucagon (human recombinant) injection 1 mg  1 mg Intramuscular PRN Eleazar Westbrook MD        hydrALAZINE injection 10 mg  10 mg Intravenous Q4H PRN sIaac Zepeda MD   10 mg at 12/20/23 0221    And    labetaloL injection 10 mg  10 mg Intravenous Q4H PRN Isaac Zepeda MD   10 mg at 12/20/23 0143    insulin aspart U-100 injection 0-5 Units  0-5 Units Subcutaneous Q6H PRN Eleazar Westbrook MD   2 Units at 12/20/23 0624    levETIRAcetam in NaCl (iso-os) IVPB 1,000 mg  1,000 mg Intravenous Q12H Italo Orozco MD        mannitol 25%  injection 25 g  25 g Intravenous Once Italo Orozco MD        metoprolol injection 5 mg  5 mg Intravenous Q5 Min PRN Stroda, Patrick, DO   5 mg at 12/19/23 2358    mupirocin 2 % ointment   Nasal BID Italo Orozco MD   Given at 12/20/23 0905    ondansetron injection 8 mg  8 mg Intravenous Q6H PRN Stroda, Patrick, DO   8 mg at 12/20/23 0045    sodium chloride 0.9% flush 10 mL  10 mL Intravenous PRN Ijeoma Hernandez, NP        sodium chloride 3% HYPERTONIC solution  40 mL/hr Intravenous Continuous Italo Orozco MD           Review of Systems  Objective:     Vital Signs (Most Recent):  Temp: 98.3 °F (36.8 °C) (12/20/23 0400)  Pulse: 92 (12/20/23 0600)  Resp: 16 (12/20/23 0600)  BP: (!) 141/86 (12/20/23 0600)  SpO2: 98 % (12/20/23 0600) Vital Signs (24h Range):  Temp:  [96.8 °F (36 °C)-98.3 °F (36.8 °C)] 98.3 °F (36.8 °C)  Pulse:  [] 92  Resp:  [10-27] 16  SpO2:  [94 %-100 %] 98 %  BP: (103-190)/() 141/86     Weight: 110 kg (242 lb 8.1 oz)  Body mass index is 33.82 kg/m².     Physical Exam      Unresponsive    Significant Labs: CBC:   Recent Labs   Lab 12/19/23  1055 12/19/23  1145 12/20/23  0754   WBC  --  9.10 25.65  25.65*   HGB  --  16.0 14.8   HCT 51 48.4 43.8   PLT  --  228 225     CMP:   Recent Labs   Lab 12/19/23  1132 12/19/23  2253 12/20/23  0754    140 137   K 4.4 3.7 3.8   CO2 25 20* 22*   BUN 13.5 12.0 10.4   CREATININE 1.32* 0.99 0.94   CALCIUM 9.1 8.4* 8.5*   MG  --  1.90 2.10   ALBUMIN 4.3  --  3.9   BILITOT 2.1*  --  1.8*   ALKPHOS 66  --  62   AST 33  --  27   ALT 25  --  22       Significant Imaging: I have reviewed all pertinent imaging results/findings within the past 24 hours.

## 2023-12-20 NOTE — NURSING
Nurses Note -- 4 Eyes      12/20/2023   4:12 PM      Skin assessed during: Daily Assessment      [x] No Altered Skin Integrity Present    [x]Prevention Measures Documented      [] Yes- Altered Skin Integrity Present or Discovered   [] LDA Added if Not in Epic (Describe Wound)   [] New Altered Skin Integrity was Present on Admit and Documented in LDA   [] Wound Image Taken    Wound Care Consulted? No    Attending Nurse:  Jhonny Johnson RN/Staff Member:   Tom Santiago RN

## 2023-12-20 NOTE — PROGRESS NOTES
[facility] - [facility] ICU  Neurology  Progress Note    Patient Name: Delio Daniel Jr.  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 Patient presented to [facility] with complaints of left facial droop, slurred speech, and left sided hemiparesis. NIH 18. Complete left-sided neglect upon assessment with right gaze deviation. Unofficial read of CT head by radiology head revealing for possible dense right MCA sign.     Overview/Hospital Course:  No notes on file        Subjective:     Interval History: RN reported neurological change. Patient became somnolent. He was following commands and answering questions appropriately at 6:30 am. Stat CT head revealing for Increasing hypodensity and edema throughout the distribution of the right MCA. There is increasing mass effect with 1 point 6 cm of right to left midline shift. There is complete compression of the right lateral ventricle.    Current Neurological Medications:     Current Facility-Administered Medications   Medication Dose Route Frequency Provider Last Rate Last Admin    0.9%  NaCl infusion   Intravenous Continuous Mary, Ijeoma B,  mL/hr at 12/20/23 0613 Rate Verify at 12/20/23 0613    aspirin suppository 300 mg  300 mg Rectal Daily Mary, Ijeoma B, NP   300 mg at 12/20/23 0905    clevidipine (CLEVIPREX) 25 mg/50 mL infusion  0-32 mg/hr Intravenous Continuous StrodaPatrick DO        dextrose  10% bolus 125 mL 125 mL  12.5 g Intravenous PRN Eleazar Westbrook MD        dextrose 10% bolus 250 mL 250 mL  25 g Intravenous PRN Eleazar Westbrook MD        diltiaZEM 125 mg in dextrose 5 % 125 mL IVPB (ready to mix) (titrating)  0-15 mg/hr Intravenous Continuous Stroda, Patrick, DO 10 mL/hr at 12/20/23 0613 10 mg/hr at 12/20/23 0613    diltiaZEM injection 10 mg  10 mg Intravenous Once Eleazar Westbrook MD        glucagon (human recombinant) injection 1 mg  1 mg Intramuscular PRN Eleazar Westbrook MD        hydrALAZINE injection 10 mg  10 mg Intravenous Q4H PRN Isaac Zepeda MD   10 mg at 12/20/23 0221    And    labetaloL injection 10 mg  10 mg Intravenous Q4H PRN Isaac Zepeda MD   10 mg at 12/20/23 0143    insulin aspart U-100 injection 0-5 Units  0-5 Units Subcutaneous Q6H PRN Eleazar Westbrook MD   2 Units at 12/20/23 0624    levETIRAcetam in NaCl (iso-os) IVPB 1,000 mg  1,000 mg Intravenous Q12H Italo Orozco MD        mannitol 25% injection 25 g  25 g Intravenous Once Italo Orozco MD        metoprolol injection 5 mg  5 mg Intravenous Q5 Min PRN Stroda, Patrick, DO   5 mg at 12/19/23 2358    mupirocin 2 % ointment   Nasal BID Italo Orozco MD   Given at 12/20/23 0905    ondansetron injection 8 mg  8 mg Intravenous Q6H PRN Stroda, Patrick, DO   8 mg at 12/20/23 0045    sodium chloride 0.9% flush 10 mL  10 mL Intravenous PRN Ijeoma Hernnadez NP        sodium chloride 3% HYPERTONIC solution  40 mL/hr Intravenous Continuous Italo Orozco MD           Review of Systems  Objective:     Vital Signs (Most Recent):  Temp: 98.3 °F (36.8 °C) (12/20/23 0400)  Pulse: 92 (12/20/23 0600)  Resp: 16 (12/20/23 0600)  BP: (!) 141/86 (12/20/23 0600)  SpO2: 98 % (12/20/23 0600) Vital Signs (24h Range):  Temp:  [96.8 °F (36 °C)-98.3 °F (36.8 °C)] 98.3 °F (36.8 °C)  Pulse:  [] 92  Resp:  [10-27] 16  SpO2:  [94 %-100 %] 98 %  BP: (103-190)/()  141/86     Weight: 110 kg (242 lb 8.1 oz)  Body mass index is 33.82 kg/m².     Physical Exam      Patient recently underwent RSI     Significant Labs: CBC:   Recent Labs   Lab 12/19/23  1055 12/19/23  1145 12/20/23  0754   WBC  --  9.10 25.65  25.65*   HGB  --  16.0 14.8   HCT 51 48.4 43.8   PLT  --  228 225     CMP:   Recent Labs   Lab 12/19/23  1132 12/19/23  2253 12/20/23  0754    140 137   K 4.4 3.7 3.8   CO2 25 20* 22*   BUN 13.5 12.0 10.4   CREATININE 1.32* 0.99 0.94   CALCIUM 9.1 8.4* 8.5*   MG  --  1.90 2.10   ALBUMIN 4.3  --  3.9   BILITOT 2.1*  --  1.8*   ALKPHOS 66  --  62   AST 33  --  27   ALT 25  --  22       Significant Imaging: I have reviewed all pertinent imaging results/findings within the past 24 hours.  Assessment and Plan:     Stroke  Presented with complete left sided neglect  Possible dense right mca sign of CT head, official read pending.   CTA pending.  RF: atrial fib, CAD, HF with ICD, HTN, HLD    12/20 called for neurological change. Patient acutely unresponsive. CT head revealed Increasing hypodensity and edema throughout the distribution of the right MCA. There is increasing mass effect with 1 point 6 cm of right to left midline shift. There is complete compression of the right lateral ventricle.    Plan:  Patient underwent RSI by Dr. Orozco  Mannitol 25 grams   Hypertonic saline bolus 100 ml    Hypertonic saline:  -3% hypertonic saline at 60 mL/hour  -Goal sodium 155   -check Na and Osmo q6hr   -Hold 3% if Na is above 160 or Osmo is above 310 ... turn off ... recheck labs in 6 hours ... restart 3% when Na is below 160 or Osmo is below 310   -Keppra 1000 mg BID  Neurosurgery consulted. Spoke GIANNI Higgins. She is speaking with Dr. Can. Patient is on Xarelto. RN reports that it was not taken yesterday but did take the day before. Awaiting recommendations.     Further recommendations to follow.             VTE Risk Mitigation (From admission, onward)           Ordered     Reason for  No Pharmacological VTE Prophylaxis  Once        Question:  Reasons:  Answer:  Risk of Bleeding    12/19/23 1551     IP VTE HIGH RISK PATIENT  Once         12/19/23 1551     Place sequential compression device  Until discontinued         12/19/23 1551                    Ijeoma Hernandez NP  Neurology  [facility] - [address] [place] [address]

## 2023-12-20 NOTE — PT/OT/SLP PROGRESS
Pt with Right MCA with new increasing mass effect and 1.6 cm right-to-left midline shift. He is now intubated. Will d/c current PT orders due to medical change of status. Please re-order PT when pt is appropriate again.

## 2023-12-20 NOTE — PROCEDURE NOTE ADDENDUM
[facility]   Central Venous Catheter  Procedure Note    SUMMARY       SUMMARY     Date of Procedure: 12/20/2023    Procedure: Insertion of Central Venous Catheter    Perfomed by: Italo Orozco MD    Assisting Provider: NONE    Indications: vascular access     Pre-Procedure Diagnosis: cva    Post-Procedure Diagnosis: CVA    Anesthesia: none     Technical Procedures Used: Seldinger, ultrasound guided    Description of the Findings of the Procedure:    Informed consent was obtained for the procedure, including sedation.  Risks of lung perforation, hemorrhage, arrhythmia, and adverse drug reaction were discussed.     Maximum sterile technique was used including antiseptics, cap, gloves, gown, hand hygiene, mask, and sheet.    Under sterile conditions the skin above the on the right internal jugular vein was prepped with betadine and covered with a sterile drape. Local anesthesia was applied to the skin and subcutaneous tissues. A 22-gauge needle was used to identify the vein. An 18-gauge needle was then inserted into the vein. A guide wire was then passed easily through the catheter. There were no arrhythmias. The catheter was then withdrawn. A 7.5 Sinhala triple-lumen was then inserted into the vessel over the guide wire. The catheter was sutured into place.    There were no changes to vital signs. Catheter was flushed with 10 cc NS. Patient did tolerate procedure well.    Recommendations:    CXR ordered to verify placement.    Significant Surgical Tasks Conducted by the Assistant(s), if Applicable:     Complications: None     Estimated Blood Loss (EBL): None    Condition: Critical    Disposition: ICU - intubated and critically ill.

## 2023-12-20 NOTE — CONSULTS
Inpatient consult to Neurosurgery  Consult performed by: Fidel Kraus AGACNP-BC  Consult ordered by: Italo Orozco MD          This is a duplicate consultation to Neurosurgery.  Please see my consultation note

## 2023-12-20 NOTE — PT/OT/SLP PROGRESS
OT orders received, pt chart reviewed. Pt not seen today for evaluation due to pt with neuro change this morning and is now intubated. Neurosurgery consulted, awaiting recs. Will follow up 12/21.

## 2023-12-21 ENCOUNTER — TELEPHONE (OUTPATIENT)
Dept: FAMILY MEDICINE | Facility: CLINIC | Age: 68
End: 2023-12-21
Payer: MEDICARE

## 2023-12-21 LAB
ALBUMIN SERPL-MCNC: 3.3 G/DL (ref 3.4–4.8)
ALBUMIN/GLOB SERPL: 1.2 RATIO (ref 1.1–2)
ALLENS TEST BLOOD GAS (OHS): ABNORMAL
ALP SERPL-CCNC: 58 UNIT/L (ref 40–150)
ALT SERPL-CCNC: 23 UNIT/L (ref 0–55)
AST SERPL-CCNC: 33 UNIT/L (ref 5–34)
BASE EXCESS BLD CALC-SCNC: 2 MMOL/L
BASOPHILS # BLD AUTO: 0.07 X10(3)/MCL
BASOPHILS NFR BLD AUTO: 0.4 %
BILIRUB SERPL-MCNC: 1.4 MG/DL
BLOOD GAS SAMPLE TYPE (OHS): ABNORMAL
BUN SERPL-MCNC: 9.9 MG/DL (ref 8.4–25.7)
CA-I BLD-SCNC: 1.09 MMOL/L (ref 1.12–1.23)
CALCIUM SERPL-MCNC: 8.1 MG/DL (ref 8.8–10)
CHLORIDE SERPL-SCNC: 117 MMOL/L (ref 98–107)
CO2 BLDA-SCNC: 27.7 MMOL/L
CO2 SERPL-SCNC: 24 MMOL/L (ref 23–31)
CREAT SERPL-MCNC: 0.95 MG/DL (ref 0.73–1.18)
DRAWN BY BLOOD GAS (OHS): ABNORMAL
EOSINOPHIL # BLD AUTO: 0.11 X10(3)/MCL (ref 0–0.9)
EOSINOPHIL NFR BLD AUTO: 0.7 %
ERYTHROCYTE [DISTWIDTH] IN BLOOD BY AUTOMATED COUNT: 15.1 % (ref 11.5–17)
GFR SERPLBLD CREATININE-BSD FMLA CKD-EPI: >60 MLS/MIN/1.73/M2
GLOBULIN SER-MCNC: 2.8 GM/DL (ref 2.4–3.5)
GLUCOSE SERPL-MCNC: 119 MG/DL (ref 82–115)
HCO3 BLDA-SCNC: 26.5 MMOL/L (ref 22–26)
HCT VFR BLD AUTO: 39.8 % (ref 42–52)
HGB BLD-MCNC: 13 G/DL (ref 14–18)
IMM GRANULOCYTES # BLD AUTO: 0.07 X10(3)/MCL (ref 0–0.04)
IMM GRANULOCYTES NFR BLD AUTO: 0.4 %
INHALED O2 CONCENTRATION: 40 %
LYMPHOCYTES # BLD AUTO: 0.95 X10(3)/MCL (ref 0.6–4.6)
LYMPHOCYTES NFR BLD AUTO: 6 %
MAGNESIUM SERPL-MCNC: 2.2 MG/DL (ref 1.6–2.6)
MCH RBC QN AUTO: 29.3 PG (ref 27–31)
MCHC RBC AUTO-ENTMCNC: 32.7 G/DL (ref 33–36)
MCV RBC AUTO: 89.8 FL (ref 80–94)
MECH RR (OHS): 20 B/MIN
MODE (OHS): AC
MONOCYTES # BLD AUTO: 1.12 X10(3)/MCL (ref 0.1–1.3)
MONOCYTES NFR BLD AUTO: 7.1 %
NEUTROPHILS # BLD AUTO: 13.46 X10(3)/MCL (ref 2.1–9.2)
NEUTROPHILS NFR BLD AUTO: 85.4 %
NRBC BLD AUTO-RTO: 0 %
OSMOLALITY SERPL: 305 MOSM/KG (ref 280–300)
OSMOLALITY SERPL: 311 MOSM/KG (ref 280–300)
OSMOLALITY SERPL: 312 MOSM/KG (ref 280–300)
OXYGEN DEVICE BLOOD GAS (OHS): ABNORMAL
PCO2 BLDA: 40 MMHG (ref 35–45)
PEEP RESPIRATORY: 5 CMH2O
PH BLDA: 7.43 [PH] (ref 7.35–7.45)
PHOSPHATE SERPL-MCNC: 2 MG/DL (ref 2.3–4.7)
PLATELET # BLD AUTO: 170 X10(3)/MCL (ref 130–400)
PMV BLD AUTO: 10 FL (ref 7.4–10.4)
PO2 BLDA: 102 MMHG (ref 80–100)
POCT GLUCOSE: 163 MG/DL (ref 70–110)
POTASSIUM BLOOD GAS (OHS): 3.8 MMOL/L (ref 3.5–5)
POTASSIUM SERPL-SCNC: 3.8 MMOL/L (ref 3.5–5.1)
PROT SERPL-MCNC: 6.1 GM/DL (ref 5.8–7.6)
RBC # BLD AUTO: 4.43 X10(6)/MCL (ref 4.7–6.1)
SAMPLE SITE BLOOD GAS (OHS): ABNORMAL
SAO2 % BLDA: 98 %
SODIUM BLOOD GAS (OHS): 146 MMOL/L (ref 137–145)
SODIUM SERPL-SCNC: 147 MMOL/L (ref 136–145)
SPONT+MECH VT ON VENT: 475 ML
WBC # SPEC AUTO: 15.78 X10(3)/MCL (ref 4.5–11.5)

## 2023-12-21 PROCEDURE — 84100 ASSAY OF PHOSPHORUS: CPT

## 2023-12-21 PROCEDURE — 20000000 HC ICU ROOM

## 2023-12-21 PROCEDURE — 63600175 PHARM REV CODE 636 W HCPCS: Performed by: INTERNAL MEDICINE

## 2023-12-21 PROCEDURE — 99900035 HC TECH TIME PER 15 MIN (STAT)

## 2023-12-21 PROCEDURE — 63600175 PHARM REV CODE 636 W HCPCS

## 2023-12-21 PROCEDURE — 80053 COMPREHEN METABOLIC PANEL: CPT

## 2023-12-21 PROCEDURE — 25000003 PHARM REV CODE 250: Performed by: INTERNAL MEDICINE

## 2023-12-21 PROCEDURE — 83930 ASSAY OF BLOOD OSMOLALITY: CPT | Performed by: NURSE PRACTITIONER

## 2023-12-21 PROCEDURE — 63600175 PHARM REV CODE 636 W HCPCS: Mod: JG

## 2023-12-21 PROCEDURE — 84295 ASSAY OF SERUM SODIUM: CPT | Performed by: NURSE PRACTITIONER

## 2023-12-21 PROCEDURE — 94761 N-INVAS EAR/PLS OXIMETRY MLT: CPT | Mod: XB

## 2023-12-21 PROCEDURE — 83735 ASSAY OF MAGNESIUM: CPT

## 2023-12-21 PROCEDURE — 99900026 HC AIRWAY MAINTENANCE (STAT)

## 2023-12-21 PROCEDURE — 99024 POSTOP FOLLOW-UP VISIT: CPT | Mod: ,,, | Performed by: NEUROLOGICAL SURGERY

## 2023-12-21 PROCEDURE — 37799 UNLISTED PX VASCULAR SURGERY: CPT

## 2023-12-21 PROCEDURE — 99900031 HC PATIENT EDUCATION (STAT)

## 2023-12-21 PROCEDURE — 82803 BLOOD GASES ANY COMBINATION: CPT

## 2023-12-21 PROCEDURE — 27200966 HC CLOSED SUCTION SYSTEM

## 2023-12-21 PROCEDURE — 85025 COMPLETE CBC W/AUTO DIFF WBC: CPT

## 2023-12-21 PROCEDURE — 94003 VENT MGMT INPAT SUBQ DAY: CPT

## 2023-12-21 PROCEDURE — 99233 SBSQ HOSP IP/OBS HIGH 50: CPT | Mod: FS,,, | Performed by: PSYCHIATRY & NEUROLOGY

## 2023-12-21 PROCEDURE — 27100171 HC OXYGEN HIGH FLOW UP TO 24 HOURS

## 2023-12-21 PROCEDURE — C9248 INJ, CLEVIDIPINE BUTYRATE: HCPCS | Mod: JG

## 2023-12-21 PROCEDURE — 25000003 PHARM REV CODE 250

## 2023-12-21 RX ADMIN — LEVETIRACETAM INJECTION 1000 MG: 10 INJECTION INTRAVENOUS at 08:12

## 2023-12-21 RX ADMIN — SODIUM CHLORIDE: 9 INJECTION, SOLUTION INTRAVENOUS at 05:12

## 2023-12-21 RX ADMIN — PROPOFOL 30 MCG/KG/MIN: 10 INJECTION, EMULSION INTRAVENOUS at 02:12

## 2023-12-21 RX ADMIN — FENTANYL CITRATE 50 MCG: 50 INJECTION, SOLUTION INTRAMUSCULAR; INTRAVENOUS at 06:12

## 2023-12-21 RX ADMIN — FENTANYL CITRATE 50 MCG: 50 INJECTION, SOLUTION INTRAMUSCULAR; INTRAVENOUS at 12:12

## 2023-12-21 RX ADMIN — PROPOFOL 30 MCG/KG/MIN: 10 INJECTION, EMULSION INTRAVENOUS at 06:12

## 2023-12-21 RX ADMIN — CLEVIPIDINE 1 MG/HR: 0.5 EMULSION INTRAVENOUS at 11:12

## 2023-12-21 RX ADMIN — MUPIROCIN: 20 OINTMENT TOPICAL at 08:12

## 2023-12-21 RX ADMIN — METOROPROLOL TARTRATE 5 MG: 5 INJECTION, SOLUTION INTRAVENOUS at 08:12

## 2023-12-21 RX ADMIN — LABETALOL HYDROCHLORIDE 10 MG: 5 INJECTION, SOLUTION INTRAVENOUS at 02:12

## 2023-12-21 RX ADMIN — SODIUM CHLORIDE: 9 INJECTION, SOLUTION INTRAVENOUS at 08:12

## 2023-12-21 RX ADMIN — PROPOFOL 30 MCG/KG/MIN: 10 INJECTION, EMULSION INTRAVENOUS at 10:12

## 2023-12-21 RX ADMIN — CLEVIPIDINE 5 MG/HR: 0.5 EMULSION INTRAVENOUS at 03:12

## 2023-12-21 RX ADMIN — METOROPROLOL TARTRATE 5 MG: 5 INJECTION, SOLUTION INTRAVENOUS at 06:12

## 2023-12-21 RX ADMIN — METOROPROLOL TARTRATE 5 MG: 5 INJECTION, SOLUTION INTRAVENOUS at 05:12

## 2023-12-21 RX ADMIN — FENTANYL CITRATE 50 MCG: 50 INJECTION, SOLUTION INTRAMUSCULAR; INTRAVENOUS at 11:12

## 2023-12-21 RX ADMIN — FENTANYL CITRATE 50 MCG: 50 INJECTION, SOLUTION INTRAMUSCULAR; INTRAVENOUS at 04:12

## 2023-12-21 RX ADMIN — LABETALOL HYDROCHLORIDE 10 MG: 5 INJECTION, SOLUTION INTRAVENOUS at 05:12

## 2023-12-21 NOTE — PT/OT/SLP PROGRESS
Pt not seen today due to patient not appropriate for therapy at this time. Pt with emergent craniectomy yesterday 12/20 and worsening neuro exam this morning. Pt currently intubated/sedated and has bedrest orders until this evening. Will follow-up 12/22.

## 2023-12-21 NOTE — TRANSFER OF CARE
"Anesthesia Transfer of Care Note    Patient: Delio Daniel Jr.    Procedure(s) Performed: Procedure(s) (LRB):  CRANIECTOMY (Right)    Patient location: ICU    Anesthesia Type: general    Transport from OR: Transported from OR intubated on 100% O2 by AMBU with assisted ventilation    Post pain: adequate analgesia    Post assessment: no apparent anesthetic complications    Post vital signs: stable    Level of consciousness: sedated    Nausea/Vomiting: no nausea/vomiting    Complications: none    Transfer of care protocol was followed      Last vitals: Visit Vitals  /80   Pulse 100   Temp 36.8 °C (98.3 °F) (Oral)   Resp 10   Ht 5' 11" (1.803 m)   Wt 110 kg (242 lb 8.1 oz)   SpO2 100%   BMI 33.82 kg/m²     "
Cardiac

## 2023-12-21 NOTE — PROGRESS NOTES
Inpatient Nutrition Assessment    Admit Date: 12/19/2023   Total duration of encounter: 2 days   Patient Age: 68 y.o.    Nutrition Recommendation/Prescription     Start tube feeding when appropriate.  Tube feeding recommendation:     Peptamen Intense VHP goal rate 50 ml/hr +3 packets ProSource TF20 daily to provide  1240 kcal/d (81% est needs, 119% with meds)  153 g protein/d (97% est needs)  840 ml free water/d   (calculations based on estimated 20 hr/d run time)     Communication of Recommendations: reviewed with nurse    Nutrition Assessment     Malnutrition Assessment/Nutrition-Focused Physical Exam    Malnutrition Context: acute illness or injury (12/21/23 1406)  Malnutrition Level:  (does not meet criteria) (12/21/23 1406)  Energy Intake (Malnutrition):  (unable to eval) (12/21/23 1406)  Weight Loss (Malnutrition):  (unable to eval) (12/21/23 1406)  Subcutaneous Fat (Malnutrition):  (does not meet criteria) (12/21/23 1406)           Muscle Mass (Malnutrition):  (does not meet criteria) (12/21/23 1406)                          Fluid Accumulation (Malnutrition):  (does not meet criteria) (12/21/23 1406)        A minimum of two characteristics is recommended for diagnosis of either severe or non-severe malnutrition.    Chart Review    Reason Seen: continuous nutrition monitoring    Malnutrition Screening Tool Results   Have you recently lost weight without trying?: No  Have you been eating poorly because of a decreased appetite?: No   MST Score: 0   Diagnosis:  CVA s/p right ICA and MCA M3  branch thrombectomy s/p craniectomy with hemorrhagic conversion  Acute hypoxic respiratory failure  Atrial fibrillation    Relevant Medical History: Afib, HTN, CAD, CAD (STEMI 2003), HFpEF     Scheduled Medications:  aspirin, 300 mg, Daily  diltiaZEM, 10 mg, Once  levETIRAcetam (Keppra) IV (PEDS and ADULTS), 1,000 mg, Q12H  mannitol 20%, 75 g, Once  mupirocin, , BID    Continuous Infusions:  sodium chloride 0.9%, Last Rate: 75  mL/hr at 12/21/23 0848  clevidipine, Last Rate: 1 mg/hr (12/21/23 1139)  dexmedeTOMIDine (Precedex) infusion (titrating), Last Rate: Stopped (12/20/23 2105)  dilTIAZem, Last Rate: 10 mg/hr (12/20/23 1146)  NORepinephrine bitartrate-D5W, Last Rate: Stopped (12/20/23 1940)  propofoL, Last Rate: 30 mcg/kg/min (12/21/23 0635)  sodium chloride 3% HYPERTONIC, Last Rate: 30 mL/hr (12/21/23 1228)    PRN Medications: 0.9%  NaCl infusion (for blood administration), dextrose 10%, dextrose 10%, fentaNYL, glucagon (human recombinant), hydrALAZINE **AND** labetalol, insulin aspart U-100, metoprolol, ondansetron, sodium chloride 0.9%    Calorie Containing IV Medications: Diprivan @ 19 ml/hr (provides 500 kcal/d) and Cleviprex @ 2 ml/hr (provides 96 kcal/d)    Recent Labs   Lab 12/19/23  1055 12/19/23  1132 12/19/23  1145 12/19/23  2253 12/20/23  0754 12/20/23  1209 12/20/23  1903 12/21/23  0026 12/21/23  0737   NA  --  140  --  140 137 133* 144 147* 147*   K  --  4.4  --  3.7 3.8  --  3.8 3.8  --    CALCIUM  --  9.1  --  8.4* 8.5*  --  8.0* 8.1*  --    PHOS  --   --   --  3.1 2.0*  --   --  2.0*  --    MG  --   --   --  1.90 2.10  --   --  2.20  --    CHLORIDE  --  106  --  109* 105  --  113* 117*  --    CO2  --  25  --  20* 22*  --  23 24  --    BUN  --  13.5  --  12.0 10.4  --  10.0 9.9  --    CREATININE  --  1.32*  --  0.99 0.94  --  1.02 0.95  --    EGFRNORACEVR  --  59  --  >60 >60  --  >60 >60  --    GLUCOSE  --  137*  --  166* 207*  --  135* 119*  --    BILITOT  --  2.1*  --   --  1.8*  --   --  1.4  --    ALKPHOS  --  66  --   --  62  --   --  58  --    ALT  --  25  --   --  22  --   --  23  --    AST  --  33  --   --  27  --   --  33  --    ALBUMIN  --  4.3  --   --  3.9  --   --  3.3*  --    TRIG  --  124  --   --   --   --   --   --   --    HGBA1C  --   --   --   --  5.6  --   --   --   --    WBC  --   --  9.10  --  25.65  25.65*  --  21.71* 15.78*  --    HGB  --   --  16.0  --  14.8  --  13.5* 13.0*  --    HCT 51  --   "48.4  --  43.8  --  39.8* 39.8*  --      Nutrition Orders:  Diet NPO      Appetite/Oral Intake: not applicable/not applicable  Factors Affecting Nutritional Intake: on mechanical ventilation  Food/Yarsani/Cultural Preferences: unable to obtain  Food Allergies: none reported  Last Bowel Movement: 12/19/23  Wound(s):  incision noted    Comments    12/21/23: Discussed with RN. Will provide tube feeding recommendations for when appropriate to start tube feeding. Receiving kcal from meds.      Anthropometrics    Height: 5' 10.98" (180.3 cm),    Last Weight: 110 kg (242 lb 8.1 oz) (12/19/23 1645), Weight Method: Bed Scale  BMI (Calculated): 33.8  BMI Classification: obese grade I (BMI 30-34.9)        Ideal Body Weight (IBW), Male: 171.88 lb     % Ideal Body Weight, Male (lb): 140.99 %                          Usual Weight Provided By: unable to obtain usual weight    Wt Readings from Last 5 Encounters:   12/19/23 110 kg (242 lb 8.1 oz)   12/11/23 112.7 kg (248 lb 7.3 oz)   12/05/23 112.3 kg (247 lb 9.6 oz)   11/07/23 109.5 kg (241 lb 6.5 oz)   10/30/23 113.9 kg (251 lb 1.7 oz)     Weight Change(s) Since Admission:   Wt Readings from Last 1 Encounters:   12/19/23 1645 110 kg (242 lb 8.1 oz)   12/19/23 1053 110 kg (242 lb 8.1 oz)   Admit Weight: 110 kg (242 lb 8.1 oz) (12/19/23 1053), Weight Method: Bed Scale    Estimated Needs    Weight Used For Calorie Calculations: 110 kg (242 lb 8.1 oz)  Energy Calorie Requirements (kcal): 1210-1540kcal (11-14kcal/kg)  Energy Need Method: Kcal/kg  Weight Used For Protein Calculations: 78.2 kg (172 lb 6.4 oz) (IBW)  Protein Requirements: 157gm (2g/kg IBW)  Fluid Requirements (mL): per MD (on 3%NS)    Enteral Nutrition     Patient not receiving enteral nutrition at this time.    Parenteral Nutrition     Patient not receiving parenteral nutrition support at this time.    Evaluation of Received Nutrient Intake    Calories: not meeting estimated needs  Protein: not meeting estimated " needs    Patient Education     Not applicable.    Nutrition Diagnosis     PES: Inadequate oral intake related to acute illness as evidenced by intubation since 12/19/23. (new)     Nutrition Interventions     Intervention(s): modified composition of enteral nutrition, modified rate of enteral nutrition, and collaboration with other providers    Goal: Meet greater than 80% of nutritional needs by follow-up. (new)  Goal: Tolerate enteral feeding at goal rate by follow-up. (new)    Nutrition Goals & Monitoring     Dietitian will monitor: energy intake    Nutrition Risk/Follow-Up: high (follow-up in 1-4 days)   Please consult if re-assessment needed sooner.

## 2023-12-21 NOTE — PLAN OF CARE
12/21/23 1529   Discharge Assessment   Assessment Type Discharge Planning Assessment   Confirmed/corrected address, phone number and insurance Yes   Confirmed Demographics   (Pt's address is [address] [place], [place])   Source of Information family  (dgTony pope [phone]))   Communicated EDER with patient/caregiver Date not available/Unable to determine   Reason For Admission CVA s/p crani   People in Home child(km), adult  (Pt lives with his Beth pinto in a single story home with no steps to enter)   Do you expect to return to your current living situation?   (TBD pt may need rehab)   Do you have help at home or someone to help you manage your care at home? Yes   Who are your caregiver(s) and their phone number(s)? dgtrs   Prior to hospitilization cognitive status: Alert/Oriented   Current cognitive status: Coma/Sedated/Intubated   Walking or Climbing Stairs Difficulty yes   Walking or Climbing Stairs ambulation difficulty, requires equipment   Mobility Management straoght cane   Dressing/Bathing Difficulty no   Home Layout Able to live on 1st floor   Equipment Currently Used at Home cane, straight   Readmission within 30 days? No   Patient currently being followed by outpatient case management? No   Do you currently have service(s) that help you manage your care at home? No   Do you take prescription medications? Yes   Do you have prescription coverage? Yes   Coverage [facility]   Who is going to help you get home at discharge? family   How do you get to doctors appointments? health plan transportation  (Pt's dgtr reported pt takes the bus to his appts)   Are you on dialysis? No   Discharge Plan A Rehab  (Pt was very independent prior to hospital stay per pt's dgtr)   Discharge Plan B Home Health   DME Needed Upon Discharge  other (see comments)  (TBD)   Discharge Plan discussed with: Adult children   Transition of Care Barriers None   OTHER   Name(s) of People in Home Beth pinto     Pt's PCP is at [facility]  internal medicine and pt's dgtr thinks it is Dr Fall. Pt's  is his dgtr, Tony ([phone]). Pt uses [facility] pharmacy. Pt does not drive or work, disable. Spoke with pt's dgtr re rehab. She is very interested in the rehab for her father. CM to follow

## 2023-12-21 NOTE — PROGRESS NOTES
[facility]  Pulmonary Critical Care Note    Patient Name: Delio Daniel Jr.  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 His last known normal was 9:15 per EMS. Stroke protocol in ED initiated. Unofficial CT head showed possible dense R MCA. Pt taken to cath lab for BRAD thrombectomy. Admitted to ICU for post-operative care and monitoring.     Hospital Course/Significant events:  12/19/2023 right internal carotid artery thrombectomy  12/20/2023-craniectomy    24 Hour Interval History:  Patient had worsening neuro exam this morning.  Stat CT head was performed which reveals new areas of hemorrhagic transformation.  Mass effect ipsilateral ventricle a midline shift of 5.6 mm Dr. Apley has been made aware of the findings.    Past Medical History:   Diagnosis Date    Arthritis     Atrial fibrillation     BPH (benign prostatic hyperplasia)     Cardiac arrest     Coronary artery disease     Cyst, kidney, acquired     Diverticulosis     Hyperlipidemia     Hypertension     MI (myocardial infarction)     Obesity     Steatosis of liver        Past Surgical History:   Procedure Laterality Date    A-V CARDIAC PACEMAKER INSERTION Right     CARDIAC CATHETERIZATION      COLONOSCOPY W/ BIOPSIES      excision of colon         Social History     Socioeconomic History    Marital status:     Number of children: 9   Occupational  History    Occupation: retired   Tobacco Use    Smoking status: Former    Smokeless tobacco: Never   Substance and Sexual Activity    Alcohol use: Not Currently    Drug use: Not Currently    Sexual activity: Not Currently     Partners: Female     Social Determinants of Health     Financial Resource Strain: Low Risk  (10/19/2022)    Overall Financial Resource Strain (CARDIA)     Difficulty of Paying Living Expenses: Not hard at all   Food Insecurity: No Food Insecurity (10/19/2022)    Hunger Vital Sign     Worried About Running Out of Food in the Last Year: Never true     Ran Out of Food in the Last Year: Never true   Transportation Needs: No Transportation Needs (10/19/2022)    PRAPARE - Transportation     Lack of Transportation (Medical): No     Lack of Transportation (Non-Medical): No   Physical Activity: Sufficiently Active (10/19/2022)    Exercise Vital Sign     Days of Exercise per Week: 6 days     Minutes of Exercise per Session: 60 min   Stress: No Stress Concern Present (10/19/2022)    [employer] - Occupational Stress Questionnaire     Feeling of Stress : Not at all   Social Connections: Unknown (10/19/2022)    Social Connection and Isolation Panel [NHANES]     Frequency of Communication with Friends and Family: More than three times a week     Frequency of Social Gatherings with Friends and Family: More than three times a week     Attends Latter-day Services: More than 4 times per year     Active Member of Clubs or Organizations: No     Attends Club or Organization Meetings: Never   Housing Stability: Low Risk  (10/19/2022)    Housing Stability Vital Sign     Unable to Pay for Housing in the Last Year: No     Number of Places Lived in the Last Year: 1     Unstable Housing in the Last Year: No           Current Outpatient Medications   Medication Instructions    albuterol (PROVENTIL/VENTOLIN HFA) 90 mcg/actuation inhaler 2 puffs, Inhalation, Every 6 hours PRN, Rescue    aspirin  81 MG Chew chew and swallow 1 tablet by mouth daily    atorvastatin (LIPITOR) 40 mg, Oral, Daily    cetirizine (ZYRTEC) 10 mg, Oral, Daily    diltiaZEM (CARDIZEM CD) 360 mg, Oral, Daily    losartan (COZAAR) 50 mg, Oral, Daily    metoprolol succinate (TOPROL-XL) 200 mg, Oral, 2 times daily    omeprazole (PRILOSEC) 20 mg, Oral, Daily, One tab by mouth daily    potassium chloride (KLOR-CON) 20 mEq Pack 20 mEq, Oral, Daily    spironolactone (ALDACTONE) 50 mg, Oral, Daily    torsemide (DEMADEX) 10 mg, Oral, Daily    vitamin D (VITAMIN D3) 1,000 Units, Oral, Daily    XARELTO 20 mg Tab TAKE 1 TABLET BY MOUTH DAILY with SUPPER       Current Inpatient Medications   aspirin  300 mg Rectal Daily    diltiaZEM  10 mg Intravenous Once    levETIRAcetam (Keppra) IV (PEDS and ADULTS)  1,000 mg Intravenous Q12H    mannitol 20%  75 g Intravenous Once    mupirocin   Nasal BID       Current Intravenous Infusions   sodium chloride 0.9% 75 mL/hr at 12/20/23 1323    clevidipine 10 mg/hr (12/20/23 1146)    dexmedeTOMIDine (Precedex) infusion (titrating) Stopped (12/20/23 2105)    dilTIAZem 10 mg/hr (12/20/23 1146)    NORepinephrine bitartrate-D5W Stopped (12/20/23 1940)    propofoL 30 mcg/kg/min (12/21/23 0202)    sodium chloride 3% HYPERTONIC 20 mL/hr (12/20/23 2118)         ROS       Objective:       Intake/Output Summary (Last 24 hours) at 12/21/2023 0448  Last data filed at 12/21/2023 0006  Gross per 24 hour   Intake 2968.74 ml   Output 2995 ml   Net -26.26 ml           Vital Signs (Most Recent):  Temp: 99 °F (37.2 °C) (12/21/23 0000)  Pulse: 100 (12/21/23 0430)  Resp: (!) 3 (12/21/23 0430)  BP: 129/89 (12/21/23 0415)  SpO2: 98 % (12/21/23 0430)  Body mass index is 33.82 kg/m².  Weight: 110 kg (242 lb 8.1 oz) Vital Signs (24h Range):  Temp:  [97.9 °F (36.6 °C)-99 °F (37.2 °C)] 99 °F (37.2 °C)  Pulse:  [] 100  Resp:  [0-28] 3  SpO2:  [80 %-100 %] 98 %  BP: ()/() 129/89     Physical Exam  General:  no acute respiratory  distress  Head:  Dressing around head intact  Eyes: PERRL, EOMI, anicteric sclera  Neck: supple, normal ROM, no thyromegaly   CVS:  Irregularly irregular   Resp:  Lungs clear to auscultation bilaterally, no wheezes, rales, or rhonci  GI:  Abdomen soft, non-tender, non-distended, normoactive bowel sounds  Skin:  No rashes, ulcers, erythema  Neuro:  Pupillary and corneal reflexes intact.      Lines/Drains/Airways       Central Venous Catheter Line  Duration             Percutaneous Central Line Insertion/Assessment - Triple Lumen  12/20/23 1056 Internal Jugular Right <1 day              Drain  Duration                  Closed/Suction Drain Right Scalp -- days         NG/OG Tube 12/20/23 0930 16 Fr. Center mouth <1 day         Urethral Catheter 12/20/23 1007 <1 day              Airway  Duration                  Airway - Non-Surgical 12/20/23 0930 Endotracheal Tube <1 day              Arterial Line  Duration             Arterial Line Left Radial -- days              Peripheral Intravenous Line  Duration                  Peripheral IV - Single Lumen 12/19/23 1045 20 G Lateral;Left Breast 1 day         Peripheral IV - Single Lumen 12/19/23 1050 18 G Anterior;Distal;Left Upper Arm 1 day         Peripheral IV - Single Lumen 12/19/23 1053 20 G Right Antecubital 1 day                    Significant Labs:    Lab Results   Component Value Date    WBC 15.78 (H) 12/21/2023    HGB 13.0 (L) 12/21/2023    HCT 39.8 (L) 12/21/2023    MCV 89.8 12/21/2023     12/21/2023         BMP  Lab Results   Component Value Date     (H) 12/21/2023    K 3.8 12/21/2023    CHLORIDE 117 (H) 12/21/2023    CO2 24 12/21/2023    BUN 9.9 12/21/2023    CREATININE 0.95 12/21/2023    CALCIUM 8.1 (L) 12/21/2023    AGAP 8.0 12/20/2023    EGFRNONAA 61 04/23/2022       ABG  Recent Labs   Lab 12/20/23 2038   PH 7.420   PO2 103.0*   PCO2 41.0   HCO3 26.6*         Mechanical Ventilation Support:  Vent Mode: A/C (12/21/23 0014)  Set Rate: 20 BPM  (12/21/23 0014)  Vt Set: 475 mL (12/21/23 0014)  PEEP/CPAP: 5 cmH20 (12/21/23 0014)  Oxygen Concentration (%): 40 (12/21/23 0417)  Peak Airway Pressure: 25 cmH20 (12/21/23 0014)  Total Ve: 8.9 L/m (12/21/23 0014)  F/VT Ratio<105 (RSBI): (!) 53.53 (12/20/23 1124)    Significant Imaging:  I have reviewed the pertinent imaging within the past 24 hours.        Assessment/Plan:     Assessment  CVA s/p right ICA and MCA M3  branch thrombectomy s/p craniectomy with hemorrhagic conversion  Acute hypoxic respiratory failure requiring intubation  Atrial fibrillation  Onychomycosis  CAD  Hypertension  Hyperlipidemia  Elevated TSH  ADA      Plan  -q.1h neuro checks  -patient is currently on Cardizem drip for Afib  -echocardiogram EF 50-55% without intracardiac shunt  -replete potassium and magnesium.  Keep magnesium greater than 2  -continue hypertonic saline  -patient is currently on propofol for sedation  - CT head revealed worsening hemorrhagic transformation.  Neurosurgery was notified awaiting further recommendations    DVT Prophylaxis:  SCDs  GI Prophylaxis:  None     32 minutes of critical care was time spent personally by me on the following activities: development of treatment plan with patient or surrogate and bedside caregivers, discussions with consultants, evaluation of patient's response to treatment, examination of patient, ordering and performing treatments and interventions, ordering and review of laboratory studies, ordering and review of radiographic studies, pulse oximetry, re-evaluation of patient's condition.  This critical care time did not overlap with that of any other provider or involve time for any procedures.     Eleazar Westbrook MD  Pulmonary Critical Care Medicine  [facility]

## 2023-12-21 NOTE — TELEPHONE ENCOUNTER
The order for the cane for home use was faxed to [employer] with successful confirmation.    Helena Ray RN    [facility]

## 2023-12-21 NOTE — SUBJECTIVE & OBJECTIVE
Subjective:     Interval History: Underwent right hemicrani yesterday evening.  Remains intubated and sedated.  No family at bedside.  Repeat Cth showed Evolving ischemic changes in the right cerebral hemisphere with interval development of hemorrhagic transformation.      Current Facility-Administered Medications   Medication Dose Route Frequency Provider Last Rate Last Admin    0.9%  NaCl infusion (for blood administration)   Intravenous Q24H PRN Fidel Kraus E, AGACNP-BC        0.9%  NaCl infusion   Intravenous Continuous Mary, Ijeoma B, NP 75 mL/hr at 12/21/23 0848 New Bag at 12/21/23 0848    aspirin suppository 300 mg  300 mg Rectal Daily Mary, Ijeoma B, NP   300 mg at 12/20/23 0905    clevidipine (CLEVIPREX) 25 mg/50 mL infusion  0-32 mg/hr Intravenous Continuous Stroda, Patrick, DO 2 mL/hr at 12/21/23 1139 1 mg/hr at 12/21/23 1139    dexmedetomidine (PRECEDEX) 400mcg/100mL 0.9% NaCL infusion  0-1.4 mcg/kg/hr Intravenous Continuous Italo Orozco MD   Stopped at 12/20/23 2105    dextrose 10% bolus 125 mL 125 mL  12.5 g Intravenous PRN Eleazar Westbrook MD        dextrose 10% bolus 250 mL 250 mL  25 g Intravenous PRN Eleazar Westbrook MD        diltiaZEM 125 mg in dextrose 5 % 125 mL IVPB (ready to mix) (titrating)  0-15 mg/hr Intravenous Continuous Stroda, Patrick, DO 10 mL/hr at 12/20/23 1146 10 mg/hr at 12/20/23 1146    diltiaZEM injection 10 mg  10 mg Intravenous Once Eleazar Westbrook MD        fentaNYL injection 50 mcg  50 mcg Intravenous Q1H PRN Eleazar Westbrook MD   50 mcg at 12/21/23 1106    glucagon (human recombinant) injection 1 mg  1 mg Intramuscular PRN Eleazar Westbrook MD        hydrALAZINE injection 10 mg  10 mg Intravenous Q4H PRN Gasper Barlow MD        And    labetaloL injection 10 mg  10 mg Intravenous Q4H PRN Gasper Barlow MD   10 mg at 12/21/23 0554    insulin aspart U-100 injection 0-5 Units  0-5  Units Subcutaneous Q6H PRN Eleazar Westbrook MD   2 Units at 12/20/23 0624    levETIRAcetam in NaCl (iso-os) IVPB 1,000 mg  1,000 mg Intravenous Q12H Italo Orozco MD   Stopped at 12/21/23 0916    mannitol 20% infusion 75 g  75 g Intravenous Once Kraus Fidel PRATT, AGACNP-BC        metoprolol injection 5 mg  5 mg Intravenous Q5 Min PRN Patrick Gipson DO   5 mg at 12/20/23 2101    mupirocin 2 % ointment   Nasal BID Italo Orozco MD   Given at 12/21/23 0849    NORepinephrine 8 mg in dextrose 5% 250 mL infusion  0-3 mcg/kg/min Intravenous Continuous Italo Orozco MD   Stopped at 12/20/23 1940    ondansetron injection 8 mg  8 mg Intravenous Q6H PRN Patrick Gipson DO   8 mg at 12/20/23 0045    propofol (DIPRIVAN) 10 mg/mL infusion  0-50 mcg/kg/min Intravenous Continuous Gasper Barlow MD 19.8 mL/hr at 12/21/23 0635 30 mcg/kg/min at 12/21/23 0635    sodium chloride 0.9% flush 10 mL  10 mL Intravenous PRN Ijeoma Hernandez NP        sodium chloride 3% HYPERTONIC solution  30 mL/hr Intravenous Continuous Lyn Levine PA 30 mL/hr at 12/21/23 1228 30 mL/hr at 12/21/23 1228       Review of Systems   Unable to perform ROS: Acuity of condition     Objective:     Vital Signs (Most Recent):  Temp: 99.3 °F (37.4 °C) (12/21/23 1200)  Pulse: (!) 111 (12/21/23 0530)  Resp: 20 (12/21/23 1106)  BP: (!) 132/97 (12/21/23 0530)  SpO2: 98 % (12/21/23 0530) Vital Signs (24h Range):  Temp:  [97.9 °F (36.6 °C)-99.3 °F (37.4 °C)] 99.3 °F (37.4 °C)  Pulse:  [] 111  Resp:  [0-27] 20  SpO2:  [80 %-100 %] 98 %  BP: ()/(66-99) 132/97     Weight: 110 kg (242 lb 8.1 oz)  Body mass index is 33.84 kg/m².     Physical Exam  Vitals and nursing note reviewed.   Constitutional:       Interventions: He is sedated and intubated.   Eyes:      Pupils: Pupils are equal, round, and reactive to light.   Cardiovascular:      Rate and Rhythm: Tachycardia present. Rhythm irregular.   Pulmonary:      Effort: He is intubated.    Musculoskeletal:      Right lower leg: No edema.      Left lower leg: No edema.   Skin:     General: Skin is warm and dry.      Capillary Refill: Capillary refill takes less than 2 seconds.   Neurological:      Comments:   Exam limited 2/2 sedation  Intubated, sedated with propofol  + cough/gag  No withdraw all extremities        Significant Labs: BMP:   Recent Labs   Lab 12/19/23  2253 12/20/23  0754 12/20/23  1209 12/20/23  1903 12/21/23  0026 12/21/23  0737    137   < > 144 147* 147*   K 3.7 3.8  --  3.8 3.8  --    CO2 20* 22*  --  23 24  --    BUN 12.0 10.4  --  10.0 9.9  --    CREATININE 0.99 0.94  --  1.02 0.95  --    CALCIUM 8.4* 8.5*  --  8.0* 8.1*  --    MG 1.90 2.10  --   --  2.20  --     < > = values in this interval not displayed.     CBC:   Recent Labs   Lab 12/20/23  0754 12/20/23  1903 12/21/23  0026   WBC 25.65  25.65* 21.71* 15.78*   HGB 14.8 13.5* 13.0*   HCT 43.8 39.8* 39.8*    206 170       Significant Imaging: I have reviewed all pertinent imaging results/findings within the past 24 hours.

## 2023-12-21 NOTE — BRIEF OP NOTE
[facility]  Brief Operative Note    SUMMARY     Surgery Date: 12/20/2023     Surgeon(s) and Role:     * Artem Can MD - Primary    Assisting Surgeon: None    Pre-op Diagnosis:  Cerebrovascular accident (CVA) due to thrombosis of right middle cerebral artery [I63.311]    Post-op Diagnosis:  Post-Op Diagnosis Codes:     * Cerebrovascular accident (CVA) due to thrombosis of right middle cerebral artery [I63.311]    Procedure(s) (LRB):  CRANIECTOMY (Right)    Anesthesia: General    Implants:  Implant Name Type Inv. Item Serial No.  Lot No. LRB No. Used Action   DURAMATRIX ONLAY 5X7 MEMBRANE - [id number]  DURAMATRIX ONLAY 5X7 MEMBRANE  [employer]. [id number] Right 1 Implanted   MEMBRANE SEPRAFILM 5 X 6 - [id number]  MEMBRANE SEPRAFILM 5 X 6  GENZYME CHETAN [id number] Right 1 Implanted       Operative Findings: swollen brain    Estimated Blood Loss: * No values recorded between 12/20/2023  4:59 PM and 12/20/2023  6:39 PM *    Estimated Blood Loss has been documented.  100cc         Specimens:   Specimen (24h ago, onward)      None            [id number]

## 2023-12-21 NOTE — ANESTHESIA POSTPROCEDURE EVALUATION
Anesthesia Post Evaluation    Patient: Delio Daniel Jr.    Procedure(s) Performed: Procedure(s) (LRB):  CRANIECTOMY (Right)    Final Anesthesia Type: general      Patient location during evaluation: ICU  Patient participation: No - Unable to Participate, Intubation  Level of consciousness: sedated  Post-procedure vital signs: reviewed and stable  Pain management: adequate  Airway patency: patent  ADA mitigation strategies: Multimodal analgesia  PONV status at discharge: No PONV  Anesthetic complications: no      Cardiovascular status: blood pressure returned to baseline and hemodynamically stable  Respiratory status: ETT and ventilator  Hydration status: euvolemic  Follow-up not needed.          Vitals Value Taken Time   BP 98/66 12/20/23 2231   Temp 36.6 °C (97.9 °F) 12/20/23 1930   Pulse 94 12/20/23 2238   Resp 0 12/20/23 2238   SpO2 98 % 12/20/23 2238   Vitals shown include unvalidated device data.      No case tracking events are documented in the log.      Pain/Deana Score: Pain Rating Post Med Admin: 0 (12/20/2023 10:05 AM)

## 2023-12-21 NOTE — PROGRESS NOTES
[facility] - [facility]  Neurology  Progress Note    Patient Name: Delio Daniel Jr.  MRN: [id number]  Admission Date: 12/19/2023  Hospital Length of Stay: 2 days  Code Status: Full Code   Attending Provider: Italo Orozco MD  Primary Care Physician: Candy, Primary Doctor   Principal Problem:Stroke        Overview/Hospital Course:  12/19/23:  Presented to [facility] ED with left sided weakness and slurred speech.  Initial NIH 18.  Underwent successful Right ICA thrombectomy (TICI 3).  12/20/23:  Had sustained episodes of AFib RVR overnight and was started on Cardizem drip.  Repeat CT head revealing increasing mass effect with a 1.6 cm midline shift.  He was intubated for airway protection.  Neurosurgery was consulted and started on mannitol and hypertonic saline.  He underwent right hemicraniectomy later that evening.        Subjective:     Interval History: Underwent right hemicrani yesterday evening.  Remains intubated and sedated.  No family at bedside.  Repeat Cth showed Evolving ischemic changes in the right cerebral hemisphere with interval development of hemorrhagic transformation.      Current Facility-Administered Medications   Medication Dose Route Frequency Provider Last Rate Last Admin    0.9%  NaCl infusion (for blood administration)   Intravenous Q24H PRN Fidel Kraus, AGACNP-BC        0.9%  NaCl infusion   Intravenous Continuous Mary, Ijeoma B, NP 75 mL/hr at 12/21/23 0848 New Bag at 12/21/23 0848    aspirin suppository 300 mg  300 mg Rectal Daily Mary, Ijeoma B, NP   300 mg at 12/20/23 0905    clevidipine (CLEVIPREX) 25 mg/50 mL infusion  0-32 mg/hr Intravenous Continuous StrodaPatrick, DO 2 mL/hr at 12/21/23 1139 1 mg/hr at 12/21/23 1139    dexmedetomidine (PRECEDEX) 400mcg/100mL 0.9% NaCL infusion  0-1.4 mcg/kg/hr Intravenous Continuous Italo Orozco MD   Stopped at 12/20/23 2105    dextrose 10% bolus 125 mL 125 mL  12.5 g Intravenous PRN Eleazar Westbrook MD         dextrose 10% bolus 250 mL 250 mL  25 g Intravenous PRN Eleazar Westbrook MD        diltiaZEM 125 mg in dextrose 5 % 125 mL IVPB (ready to mix) (titrating)  0-15 mg/hr Intravenous Continuous Stroda, Patrick, DO 10 mL/hr at 12/20/23 1146 10 mg/hr at 12/20/23 1146    diltiaZEM injection 10 mg  10 mg Intravenous Once Eleazar Westbrook MD        fentaNYL injection 50 mcg  50 mcg Intravenous Q1H PRN Eleazar Westbrook MD   50 mcg at 12/21/23 1106    glucagon (human recombinant) injection 1 mg  1 mg Intramuscular PRN Eleazar Westbrook MD        hydrALAZINE injection 10 mg  10 mg Intravenous Q4H PRN Gasper Barlow MD        And    labetaloL injection 10 mg  10 mg Intravenous Q4H PRN Gasper Barlow MD   10 mg at 12/21/23 0554    insulin aspart U-100 injection 0-5 Units  0-5 Units Subcutaneous Q6H PRN Eleazar Westbrook MD   2 Units at 12/20/23 0624    levETIRAcetam in NaCl (iso-os) IVPB 1,000 mg  1,000 mg Intravenous Q12H Italo Orozco MD   Stopped at 12/21/23 0916    mannitol 20% infusion 75 g  75 g Intravenous Once Fidle Kraus AGACNP-BC        metoprolol injection 5 mg  5 mg Intravenous Q5 Min PRN Patrick Gipson DO   5 mg at 12/20/23 2101    mupirocin 2 % ointment   Nasal BID Italo Orozco MD   Given at 12/21/23 0849    NORepinephrine 8 mg in dextrose 5% 250 mL infusion  0-3 mcg/kg/min Intravenous Continuous Italo Orozco MD   Stopped at 12/20/23 1940    ondansetron injection 8 mg  8 mg Intravenous Q6H PRN Patrick Gipson, DO   8 mg at 12/20/23 0045    propofol (DIPRIVAN) 10 mg/mL infusion  0-50 mcg/kg/min Intravenous Continuous Gasper Barlow MD 19.8 mL/hr at 12/21/23 0635 30 mcg/kg/min at 12/21/23 0635    sodium chloride 0.9% flush 10 mL  10 mL Intravenous PRN Ijeoma Hernandez B, NP        sodium chloride 3% HYPERTONIC solution  30 mL/hr Intravenous Continuous Lyn Levine, PA 30 mL/hr at 12/21/23 1228 30 mL/hr at 12/21/23 1228        Review of Systems   Unable to perform ROS: Acuity of condition     Objective:     Vital Signs (Most Recent):  Temp: 99.3 °F (37.4 °C) (12/21/23 1200)  Pulse: (!) 111 (12/21/23 0530)  Resp: 20 (12/21/23 1106)  BP: (!) 132/97 (12/21/23 0530)  SpO2: 98 % (12/21/23 0530) Vital Signs (24h Range):  Temp:  [97.9 °F (36.6 °C)-99.3 °F (37.4 °C)] 99.3 °F (37.4 °C)  Pulse:  [] 111  Resp:  [0-27] 20  SpO2:  [80 %-100 %] 98 %  BP: ()/(66-99) 132/97     Weight: 110 kg (242 lb 8.1 oz)  Body mass index is 33.84 kg/m².     Physical Exam  Vitals and nursing note reviewed.   Constitutional:       Interventions: He is sedated and intubated.   Eyes:      Pupils: Pupils are equal, round, and reactive to light.   Cardiovascular:      Rate and Rhythm: Tachycardia present. Rhythm irregular.   Pulmonary:      Effort: He is intubated.   Musculoskeletal:      Right lower leg: No edema.      Left lower leg: No edema.   Skin:     General: Skin is warm and dry.      Capillary Refill: Capillary refill takes less than 2 seconds.   Neurological:      Comments:   Exam limited 2/2 sedation  Intubated, sedated with propofol  + cough/gag  No withdraw all extremities        Significant Labs: BMP:   Recent Labs   Lab 12/19/23  2253 12/20/23  0754 12/20/23  1209 12/20/23  1903 12/21/23  0026 12/21/23  0737    137   < > 144 147* 147*   K 3.7 3.8  --  3.8 3.8  --    CO2 20* 22*  --  23 24  --    BUN 12.0 10.4  --  10.0 9.9  --    CREATININE 0.99 0.94  --  1.02 0.95  --    CALCIUM 8.4* 8.5*  --  8.0* 8.1*  --    MG 1.90 2.10  --   --  2.20  --     < > = values in this interval not displayed.     CBC:   Recent Labs   Lab 12/20/23  0754 12/20/23  1903 12/21/23  0026   WBC 25.65  25.65* 21.71* 15.78*   HGB 14.8 13.5* 13.0*   HCT 43.8 39.8* 39.8*    206 170       Significant Imaging: I have reviewed all pertinent imaging results/findings within the past 24 hours.    Assessment and Plan:     * Stroke  - presented with left sided  weakness and slurred speech.  Initial NIH 18.  - stroke RF: AFib (on Xarelto), HTN, HLD, CAD, obesity, former smoker  - intervention:  Underwent successful Right ICA thrombectomy (TICI 3).  - etiology: concern for cardioembolic    Stroke workup:  -CTh (12/19/23 1121):  No acute intracranial findings or significant interval change compared to May 2023.  -CTA h/n (12/19/23  1146):   1.  Irregular appearance of the right internal carotid artery near the skull base and petrous portion.  This is of uncertain etiology.  This could be related to soft atheromatous plaque, ill-defined dissection flap or artifact.  2.  Poor enhancement of the right anterior circulation including the MCA and HÉCTOR on arterial phase.  There is delayed enhancement of the right HÉCTOR and MCA seen on delayed phase postcontrast imaging suggesting upstream/inflow abnormality.  3.  Very subtle asymmetric loss of gray-white differentiation in the right cerebral hemisphere most pronounced at the frontal lobe and basal ganglia.  No appreciable hemorrhage.  -ECHO: EF 50-55%, bubble study negative, mildly dilated LA  -CUS: bilateral ICA less than 50% stenosis  -LDL: 70  -A1c: 5.6  -TSH: 1.246  -home medications include ASA 81mg daily, Atorvastatin 40mg daily, and Xarelto 20mg daily  -CTh (12/20/23 0905):  Worsening exam with development of 1.6 cm of right to left midline shift.  -CTh (12/21/23 0322):  1. Postoperative changes following right-sided craniectomy.  2. Evolving ischemic changes in the right cerebral hemisphere with interval development of hemorrhagic transformation.  3. Interval improvement of mass effect.      Plan:  -hypertonic saline per neurosurgery   -continue Keppra 1000mg BID  -will discuss ASA with MD during rounds .... has ASA 300mg NJ daily ordered, but interval development of hemorrhagic transformation noted on CTh  -will likely need to wait 14 days to resume anticoagulation, plan to repeat CTh prior to resuming  anticoagulation.    Further recommendations to follow by MD.                VTE Risk Mitigation (From admission, onward)           Ordered     Reason for No Pharmacological VTE Prophylaxis  Once        Question:  Reasons:  Answer:  Risk of Bleeding    12/19/23 1551     IP VTE HIGH RISK PATIENT  Once         12/19/23 1551     Place sequential compression device  Until discontinued         12/19/23 1551                    TRUMAN Ball  Neurology  [facility] - [facility]

## 2023-12-21 NOTE — HOSPITAL COURSE
12/19/23:  Presented to [facility] ED with left sided weakness and slurred speech.  Initial NIH 18.  Underwent successful Right ICA thrombectomy (TICI 3).  12/20/23:  Had sustained episodes of AFib RVR overnight and was started on Cardizem drip.  Repeat CT head revealing increasing mass effect with a 1.6 cm midline shift.  He was intubated for airway protection.  Neurosurgery was consulted and started on mannitol and hypertonic saline.  He underwent right hemicraniectomy later that evening.

## 2023-12-21 NOTE — PROGRESS NOTES
POD#1 right craniectomy for CVA due to thrombosis of right middle cerebral artery   He is intubated and sedated at time of rounds  Does not tolerate sedation wean d/t HTN  Hypertonic saline currently at rate of 20cc/hr d/t rapid increase of Na  Last 3 sodiums were 144, 147, 147  Osm currently 312    AFVSS  Pupils fixed, pinpoint  Exam limited d/t sedation; not currently withdrawing  Nurse reports some spontaneous movement on the right when sedation weaned  +cough, corneal and gag  Turban dressing intact, dry  KRISHNA output 100cc overnight    Plan: Continue hypertonic saline at 30cc/hr with Na goal 150-155  Continue current dressing  Continue drain  Will begin to wean sedation as tolerated  Continue BP parameters below 150/90  Keppra BID  Neurology following  SCDs for DVT prophylaxis

## 2023-12-22 LAB
ALBUMIN SERPL-MCNC: 3.2 G/DL (ref 3.4–4.8)
ALBUMIN/GLOB SERPL: 1.1 RATIO (ref 1.1–2)
ALP SERPL-CCNC: 56 UNIT/L (ref 40–150)
ALT SERPL-CCNC: 28 UNIT/L (ref 0–55)
AST SERPL-CCNC: 38 UNIT/L (ref 5–34)
BASOPHILS # BLD AUTO: 0.06 X10(3)/MCL
BASOPHILS NFR BLD AUTO: 0.3 %
BILIRUB SERPL-MCNC: 1.4 MG/DL
BUN SERPL-MCNC: 10.2 MG/DL (ref 8.4–25.7)
CALCIUM SERPL-MCNC: 8 MG/DL (ref 8.8–10)
CHLORIDE SERPL-SCNC: 118 MMOL/L (ref 98–107)
CO2 SERPL-SCNC: 25 MMOL/L (ref 23–31)
CREAT SERPL-MCNC: 0.97 MG/DL (ref 0.73–1.18)
EOSINOPHIL # BLD AUTO: 0.3 X10(3)/MCL (ref 0–0.9)
EOSINOPHIL NFR BLD AUTO: 1.6 %
ERYTHROCYTE [DISTWIDTH] IN BLOOD BY AUTOMATED COUNT: 15.7 % (ref 11.5–17)
GFR SERPLBLD CREATININE-BSD FMLA CKD-EPI: >60 MLS/MIN/1.73/M2
GLOBULIN SER-MCNC: 2.9 GM/DL (ref 2.4–3.5)
GLUCOSE SERPL-MCNC: 147 MG/DL (ref 82–115)
HCT VFR BLD AUTO: 40 % (ref 42–52)
HGB BLD-MCNC: 12.8 G/DL (ref 14–18)
IMM GRANULOCYTES # BLD AUTO: 0.12 X10(3)/MCL (ref 0–0.04)
IMM GRANULOCYTES NFR BLD AUTO: 0.6 %
LYMPHOCYTES # BLD AUTO: 1.2 X10(3)/MCL (ref 0.6–4.6)
LYMPHOCYTES NFR BLD AUTO: 6.5 %
MAGNESIUM SERPL-MCNC: 2.2 MG/DL (ref 1.6–2.6)
MCH RBC QN AUTO: 29.8 PG (ref 27–31)
MCHC RBC AUTO-ENTMCNC: 32 G/DL (ref 33–36)
MCV RBC AUTO: 93 FL (ref 80–94)
MONOCYTES # BLD AUTO: 1.56 X10(3)/MCL (ref 0.1–1.3)
MONOCYTES NFR BLD AUTO: 8.4 %
NEUTROPHILS # BLD AUTO: 15.28 X10(3)/MCL (ref 2.1–9.2)
NEUTROPHILS NFR BLD AUTO: 82.6 %
NRBC BLD AUTO-RTO: 0 %
OSMOLALITY SERPL: 313 MOSM/KG (ref 280–300)
OSMOLALITY SERPL: 314 MOSM/KG (ref 280–300)
OSMOLALITY SERPL: 314 MOSM/KG (ref 280–300)
OSMOLALITY SERPL: 329 MOSM/KG (ref 280–300)
PHOSPHATE SERPL-MCNC: 1.9 MG/DL (ref 2.3–4.7)
PLATELET # BLD AUTO: 156 X10(3)/MCL (ref 130–400)
PMV BLD AUTO: 10.1 FL (ref 7.4–10.4)
POCT GLUCOSE: 100 MG/DL (ref 70–110)
POTASSIUM SERPL-SCNC: 3.6 MMOL/L (ref 3.5–5.1)
PROT SERPL-MCNC: 6.1 GM/DL (ref 5.8–7.6)
RBC # BLD AUTO: 4.3 X10(6)/MCL (ref 4.7–6.1)
SODIUM SERPL-SCNC: 150 MMOL/L (ref 136–145)
SODIUM SERPL-SCNC: 150 MMOL/L (ref 136–145)
SODIUM SERPL-SCNC: 152 MMOL/L (ref 136–145)
SODIUM SERPL-SCNC: 163 MMOL/L (ref 136–145)
WBC # SPEC AUTO: 18.52 X10(3)/MCL (ref 4.5–11.5)

## 2023-12-22 PROCEDURE — 99900026 HC AIRWAY MAINTENANCE (STAT)

## 2023-12-22 PROCEDURE — 99900035 HC TECH TIME PER 15 MIN (STAT)

## 2023-12-22 PROCEDURE — 25000003 PHARM REV CODE 250

## 2023-12-22 PROCEDURE — 84295 ASSAY OF SERUM SODIUM: CPT | Performed by: NURSE PRACTITIONER

## 2023-12-22 PROCEDURE — 94761 N-INVAS EAR/PLS OXIMETRY MLT: CPT

## 2023-12-22 PROCEDURE — 27100171 HC OXYGEN HIGH FLOW UP TO 24 HOURS

## 2023-12-22 PROCEDURE — 83735 ASSAY OF MAGNESIUM: CPT

## 2023-12-22 PROCEDURE — 20000000 HC ICU ROOM

## 2023-12-22 PROCEDURE — 63600175 PHARM REV CODE 636 W HCPCS: Performed by: INTERNAL MEDICINE

## 2023-12-22 PROCEDURE — 99024 POSTOP FOLLOW-UP VISIT: CPT | Mod: ,,, | Performed by: NEUROLOGICAL SURGERY

## 2023-12-22 PROCEDURE — 94003 VENT MGMT INPAT SUBQ DAY: CPT

## 2023-12-22 PROCEDURE — 83930 ASSAY OF BLOOD OSMOLALITY: CPT | Performed by: NURSE PRACTITIONER

## 2023-12-22 PROCEDURE — 63600175 PHARM REV CODE 636 W HCPCS: Performed by: NURSE PRACTITIONER

## 2023-12-22 PROCEDURE — 63600175 PHARM REV CODE 636 W HCPCS

## 2023-12-22 PROCEDURE — 85025 COMPLETE CBC W/AUTO DIFF WBC: CPT

## 2023-12-22 PROCEDURE — 25000003 PHARM REV CODE 250: Performed by: INTERNAL MEDICINE

## 2023-12-22 PROCEDURE — 84100 ASSAY OF PHOSPHORUS: CPT

## 2023-12-22 PROCEDURE — 80053 COMPREHEN METABOLIC PANEL: CPT

## 2023-12-22 PROCEDURE — 25000003 PHARM REV CODE 250: Performed by: NURSE PRACTITIONER

## 2023-12-22 PROCEDURE — 99900031 HC PATIENT EDUCATION (STAT)

## 2023-12-22 RX ORDER — DILTIAZEM HYDROCHLORIDE 5 MG/ML
10 INJECTION INTRAVENOUS ONCE
Status: DISCONTINUED | OUTPATIENT
Start: 2023-12-22 | End: 2023-12-24

## 2023-12-22 RX ORDER — DIGOXIN 0.25 MG/ML
250 INJECTION INTRAMUSCULAR; INTRAVENOUS ONCE
Status: COMPLETED | OUTPATIENT
Start: 2023-12-22 | End: 2023-12-22

## 2023-12-22 RX ORDER — DIGOXIN 0.25 MG/ML
500 INJECTION INTRAMUSCULAR; INTRAVENOUS ONCE
Status: COMPLETED | OUTPATIENT
Start: 2023-12-22 | End: 2023-12-22

## 2023-12-22 RX ADMIN — HYDRALAZINE HYDROCHLORIDE 10 MG: 20 INJECTION INTRAMUSCULAR; INTRAVENOUS at 05:12

## 2023-12-22 RX ADMIN — DILTIAZEM HYDROCHLORIDE 5 MG/HR: 5 INJECTION INTRAVENOUS at 10:12

## 2023-12-22 RX ADMIN — PROPOFOL 30 MCG/KG/MIN: 10 INJECTION, EMULSION INTRAVENOUS at 04:12

## 2023-12-22 RX ADMIN — MUPIROCIN: 20 OINTMENT TOPICAL at 08:12

## 2023-12-22 RX ADMIN — PROPOFOL 50 MCG/KG/MIN: 10 INJECTION, EMULSION INTRAVENOUS at 08:12

## 2023-12-22 RX ADMIN — MUPIROCIN: 20 OINTMENT TOPICAL at 09:12

## 2023-12-22 RX ADMIN — FENTANYL CITRATE 50 MCG: 50 INJECTION, SOLUTION INTRAMUSCULAR; INTRAVENOUS at 10:12

## 2023-12-22 RX ADMIN — LEVETIRACETAM INJECTION 1000 MG: 10 INJECTION INTRAVENOUS at 08:12

## 2023-12-22 RX ADMIN — PROPOFOL 25 MCG/KG/MIN: 10 INJECTION, EMULSION INTRAVENOUS at 10:12

## 2023-12-22 RX ADMIN — LABETALOL HYDROCHLORIDE 10 MG: 5 INJECTION, SOLUTION INTRAVENOUS at 01:12

## 2023-12-22 RX ADMIN — DIGOXIN 250 MCG: 0.25 INJECTION INTRAMUSCULAR; INTRAVENOUS at 04:12

## 2023-12-22 RX ADMIN — METOROPROLOL TARTRATE 5 MG: 5 INJECTION, SOLUTION INTRAVENOUS at 09:12

## 2023-12-22 RX ADMIN — PROPOFOL 50 MCG/KG/MIN: 10 INJECTION, EMULSION INTRAVENOUS at 10:12

## 2023-12-22 RX ADMIN — DILTIAZEM HYDROCHLORIDE 15 MG/HR: 5 INJECTION INTRAVENOUS at 08:12

## 2023-12-22 RX ADMIN — LEVETIRACETAM INJECTION 1000 MG: 10 INJECTION INTRAVENOUS at 09:12

## 2023-12-22 RX ADMIN — DIGOXIN 500 MCG: 0.25 INJECTION INTRAMUSCULAR; INTRAVENOUS at 11:12

## 2023-12-22 NOTE — NURSING
Nurses Note -- 4 Eyes      12/21/2023   6:04 PM      Skin assessed during: Daily Assessment      [x] No Altered Skin Integrity Present    []Prevention Measures Documented      [] Yes- Altered Skin Integrity Present or Discovered   [] LDA Added if Not in Epic (Describe Wound)   [] New Altered Skin Integrity was Present on Admit and Documented in LDA   [] Wound Image Taken    Wound Care Consulted? No    Attending Nurse:  Cyndie Johnson RN/Staff Member:  MÓNICA Bravo

## 2023-12-22 NOTE — SUBJECTIVE & OBJECTIVE
Subjective:     Interval History:   Nursing reports no new issues, some spontaneous movement of RUE and RLE overnight. Remains intubated and sedated.     Current Neurological Medications:     Current Facility-Administered Medications   Medication Dose Route Frequency Provider Last Rate Last Admin    0.9%  NaCl infusion (for blood administration)   Intravenous Q24H PRN Fidel Kraus AGACNP-BC        0.9%  NaCl infusion   Intravenous Continuous Mary, Ijeoma B, NP 75 mL/hr at 12/22/23 0555 Rate Verify at 12/22/23 0555    aspirin suppository 300 mg  300 mg Rectal Daily Mary, Ijeoma B, NP   300 mg at 12/20/23 0905    dexmedetomidine (PRECEDEX) 400mcg/100mL 0.9% NaCL infusion  0-1.4 mcg/kg/hr Intravenous Continuous Italo Orozco MD   Stopped at 12/20/23 2104    dextrose 10% bolus 125 mL 125 mL  12.5 g Intravenous PRN Eleazar Westbrook MD        dextrose 10% bolus 250 mL 250 mL  25 g Intravenous PRN Eleazar Westbrook MD        digoxin injection 250 mcg  250 mcg Intravenous Once Susanna, Rein T, FNP        digoxin injection 500 mcg  500 mcg Intravenous Once Susanna, Rein T, FNP        diltiaZEM 125 mg in dextrose 5 % 125 mL IVPB (ready to mix) (titrating)  0-15 mg/hr Intravenous Continuous Susanna, Rein T, FNP        diltiaZEM injection 10 mg  10 mg Intravenous Once Susanna, Rein T, FNP        fentaNYL injection 50 mcg  50 mcg Intravenous Q1H PRN Eleazar Westbrook MD   50 mcg at 12/21/23 1628    glucagon (human recombinant) injection 1 mg  1 mg Intramuscular PRN Eleazar Westbrook MD        hydrALAZINE injection 10 mg  10 mg Intravenous Q4H PRN Gasper Barlow MD        And    labetaloL injection 10 mg  10 mg Intravenous Q4H PRN Gasper Barlow MD   10 mg at 12/22/23 0138    insulin aspart U-100 injection 0-5 Units  0-5 Units Subcutaneous Q6H PRN Eleazar Westbrook MD   2 Units at 12/20/23 0624    levETIRAcetam in NaCl (iso-os) IVPB 1,000  mg  1,000 mg Intravenous Q12H Italo Orozco  mL/hr at 12/22/23 0917 1,000 mg at 12/22/23 0917    mannitol 20% infusion 75 g  75 g Intravenous Once Fidel Kraus, AGACNP-BC        metoprolol injection 5 mg  5 mg Intravenous Q5 Min PRN Patrick Gispon, DO   5 mg at 12/22/23 0904    mupirocin 2 % ointment   Nasal BID Italo Orozco MD   Given at 12/22/23 0916    NORepinephrine 8 mg in dextrose 5% 250 mL infusion  0-3 mcg/kg/min Intravenous Continuous Italo Orozco MD   Stopped at 12/20/23 1929    ondansetron injection 8 mg  8 mg Intravenous Q6H PRN Patrick Gipson DO   8 mg at 12/20/23 0045    propofol (DIPRIVAN) 10 mg/mL infusion  0-50 mcg/kg/min Intravenous Continuous Gasper Barlow MD 13.2 mL/hr at 12/22/23 0555 20 mcg/kg/min at 12/22/23 0555    sodium chloride 0.9% flush 10 mL  10 mL Intravenous PRN Ijeoma Hernandez, GIANNI           Review of Systems   Unable to perform ROS: Acuity of condition     Objective:     Vital Signs (Most Recent):  Temp: 99.5 °F (37.5 °C) (12/22/23 0000)  Pulse: (!) 124 (12/22/23 0630)  Resp: 18 (12/22/23 0630)  BP: (!) 140/98 (12/22/23 0615)  SpO2: 100 % (12/22/23 0630) Vital Signs (24h Range):  Temp:  [99 °F (37.2 °C)-99.5 °F (37.5 °C)] 99.5 °F (37.5 °C)  Pulse:  [] 124  Resp:  [3-34] 18  SpO2:  [95 %-100 %] 100 %  BP: (109-166)/() 140/98     Weight: 110 kg (242 lb 8.1 oz)  Body mass index is 33.84 kg/m².     Physical Exam  Vitals and nursing note reviewed.   Constitutional:       Interventions: He is sedated and intubated.   Eyes:      Pupils: Pupils are equal, round, and reactive to light.   Cardiovascular:      Rate and Rhythm: Tachycardia present. Rhythm irregular.   Pulmonary:      Effort: He is intubated.   Musculoskeletal:      Right lower leg: No edema.      Left lower leg: No edema.   Skin:     General: Skin is warm and dry.      Capillary Refill: Capillary refill takes less than 2 seconds.   Neurological:      Comments:     Intubated and sedated on mechanical  ventilation   Very limited PE  PERR, gaze midline  Withdraws from painful stim RUE, RLE  No response to pain LUE, LLE   Does not participate in exam, does not follow commands            NEUROLOGICAL EXAMINATION:     CRANIAL NERVES     CN III, IV, VI   Pupils are equal, round, and reactive to light.      Significant Labs:   Recent Lab Results         12/22/23  0538   12/22/23  0018   12/21/23  1731   12/21/23  1401        Albumin/Globulin Ratio   1.1           Albumin   3.2           ALP   56           ALT   28           AST   38           Baso #   0.06           Basophil %   0.3           BILIRUBIN TOTAL   1.4           BUN   10.2           Calcium   8.0           Chloride   118           CO2   25           Creatinine   0.97           eGFR   >60           Eos #   0.30           Eosinophil %   1.6           Globulin, Total   2.9           Glucose   147           Hematocrit   40.0           Hemoglobin   12.8           Immature Grans (Abs)   0.12           Immature Granulocytes   0.6           Lymph #   1.20           LYMPH %   6.5           Magnesium    2.20           MCH   29.8           MCHC   32.0           MCV   93.0           Mono #   1.56           Mono %   8.4           MPV   10.1           Neut #   15.28           Neut %   82.6           nRBC   0.0           Osmolality 329   313   305         Phosphorus Level   1.9           Platelet Count   156           POCT Glucose       163       Potassium   3.6           PROTEIN TOTAL   6.1           RBC   4.30           RDW   15.7           Sodium 163  Comment: Critical Result called and verified by verbal readback to: augustine Contreras on 12/22/2023 at 06:27. Reported by [id number].   150   147         WBC   18.52                   Significant Imaging: I have reviewed all pertinent imaging results/findings within the past 24 hours.

## 2023-12-22 NOTE — NURSING
Nurses Note -- 4 Eyes      12/22/2023   4:05 AM      Skin assessed during: Daily Assessment      [] No Altered Skin Integrity Present    [x]Prevention Measures Documented      [x] Yes- Altered Skin Integrity Present or Discovered   [] LDA Added if Not in Epic (Describe Wound)   [] New Altered Skin Integrity was Present on Admit and Documented in LDA   [] Wound Image Taken    Wound Care Consulted? No    Attending Nurse:  Allegra Johsnon RN/Staff Member:  Shital Li

## 2023-12-22 NOTE — ASSESSMENT & PLAN NOTE
- presented with left sided weakness and slurred speech.  Initial NIH 18.  - stroke RF: AFib (on Xarelto), HTN, HLD, CAD, obesity, former smoker  - intervention:  Underwent successful Right ICA thrombectomy (TICI 3).  - etiology: concern for cardioembolic    Stroke workup:  -CTh (12/19/23 1121):  No acute intracranial findings or significant interval change compared to May 2023.  -CTA h/n (12/19/23  1146):   1.  Irregular appearance of the right internal carotid artery near the skull base and petrous portion.  This is of uncertain etiology.  This could be related to soft atheromatous plaque, ill-defined dissection flap or artifact.  2.  Poor enhancement of the right anterior circulation including the MCA and HÉCTOR on arterial phase.  There is delayed enhancement of the right HÉCTOR and MCA seen on delayed phase postcontrast imaging suggesting upstream/inflow abnormality.  3.  Very subtle asymmetric loss of gray-white differentiation in the right cerebral hemisphere most pronounced at the frontal lobe and basal ganglia.  No appreciable hemorrhage.  -ECHO: EF 50-55%, bubble study negative, mildly dilated LA  -CUS: bilateral ICA less than 50% stenosis  -LDL: 70  -A1c: 5.6  -TSH: 1.246  -home medications include ASA 81mg daily, Atorvastatin 40mg daily, and Xarelto 20mg daily  -CTh (12/20/23 0905):  Worsening exam with development of 1.6 cm of right to left midline shift.  -CTh (12/21/23 0322):  1. Postoperative changes following right-sided craniectomy.  2. Evolving ischemic changes in the right cerebral hemisphere with interval development of hemorrhagic transformation.  3. Interval improvement of mass effect.      Plan:  - hypertonic saline per neurosurgery, currently on hold 2/2 hypernatremia   - continue Keppra 1000mg BID  - no anticoagulation until day 14. Will have to obtain CTH prior to initiating NOAC.   - will discuss ASA with MD during rounds .... Repeat CT head was stable from this am       Further recommendations  to follow by MD.

## 2023-12-22 NOTE — PROGRESS NOTES
Inpatient Nutrition Assessment    Admit Date: 12/19/2023   Total duration of encounter: 3 days   Patient Age: 68 y.o.    Nutrition Recommendation/Prescription     Start tube feeding when appropriate.  Tube feeding recommendation:     Peptamen Intense VHP goal rate 50 ml/hr +3 packets ProSource TF20 daily to provide  1240 kcal/d (81% est needs, 103% with meds)  153 g protein/d (97% est needs)  840 ml free water/d   (calculations based on estimated 20 hr/d run time)     Communication of Recommendations: reviewed with nurse    Nutrition Assessment     Malnutrition Assessment/Nutrition-Focused Physical Exam    Malnutrition Context: acute illness or injury (12/21/23 1406)  Malnutrition Level:  (does not meet criteria) (12/21/23 1406)  Energy Intake (Malnutrition):  (unable to eval) (12/21/23 1406)  Weight Loss (Malnutrition):  (unable to eval) (12/21/23 1406)  Subcutaneous Fat (Malnutrition):  (does not meet criteria) (12/21/23 1406)           Muscle Mass (Malnutrition):  (does not meet criteria) (12/21/23 1406)                          Fluid Accumulation (Malnutrition):  (does not meet criteria) (12/21/23 1406)        A minimum of two characteristics is recommended for diagnosis of either severe or non-severe malnutrition.    Chart Review    Reason Seen: continuous nutrition monitoring and follow-up    Malnutrition Screening Tool Results   Have you recently lost weight without trying?: No  Have you been eating poorly because of a decreased appetite?: No   MST Score: 0   Diagnosis:  CVA s/p right ICA and MCA M3  branch thrombectomy s/p craniectomy with hemorrhagic conversion  Acute hypoxic respiratory failure  Atrial fibrillation    Relevant Medical History: Afib, HTN, CAD, CAD (STEMI 2003), HFpEF     Scheduled Medications:  aspirin, 300 mg, Daily  digoxin, 250 mcg, Once  digoxin, 500 mcg, Once  diltiaZEM, 10 mg, Once  levETIRAcetam (Keppra) IV (PEDS and ADULTS), 1,000 mg, Q12H  mannitol 20%, 75 g, Once  mupirocin, ,  BID    Continuous Infusions:  sodium chloride 0.9%, Last Rate: 75 mL/hr at 12/22/23 0555  dexmedeTOMIDine (Precedex) infusion (titrating), Last Rate: Stopped (12/20/23 2104)  dilTIAZem  NORepinephrine bitartrate-D5W, Last Rate: Stopped (12/20/23 1929)  propofoL, Last Rate: 20 mcg/kg/min (12/22/23 0555)    PRN Medications: 0.9%  NaCl infusion (for blood administration), dextrose 10%, dextrose 10%, fentaNYL, glucagon (human recombinant), hydrALAZINE **AND** labetalol, insulin aspart U-100, metoprolol, ondansetron, sodium chloride 0.9%    Calorie Containing IV Medications: Diprivan @ 13.2 ml/hr (provides 348 kcal/d)    Recent Labs   Lab 12/19/23  1055 12/19/23  1132 12/19/23  1132 12/19/23  1145 12/19/23  2253 12/20/23  0754 12/20/23  1209 12/20/23  1903 12/21/23  0026 12/21/23  0737 12/21/23  1731 12/22/23  0018 12/22/23  0538   NA  --  140   < >  --  140 137 133* 144 147* 147* 147* 150* 163*   K  --  4.4  --   --  3.7 3.8  --  3.8 3.8  --   --  3.6  --    CALCIUM  --  9.1  --   --  8.4* 8.5*  --  8.0* 8.1*  --   --  8.0*  --    PHOS  --   --   --   --  3.1 2.0*  --   --  2.0*  --   --  1.9*  --    MG  --   --   --   --  1.90 2.10  --   --  2.20  --   --  2.20  --    CHLORIDE  --  106  --   --  109* 105  --  113* 117*  --   --  118*  --    CO2  --  25  --   --  20* 22*  --  23 24  --   --  25  --    BUN  --  13.5  --   --  12.0 10.4  --  10.0 9.9  --   --  10.2  --    CREATININE  --  1.32*  --   --  0.99 0.94  --  1.02 0.95  --   --  0.97  --    EGFRNORACEVR  --  59  --   --  >60 >60  --  >60 >60  --   --  >60  --    GLUCOSE  --  137*  --   --  166* 207*  --  135* 119*  --   --  147*  --    BILITOT  --  2.1*  --   --   --  1.8*  --   --  1.4  --   --  1.4  --    ALKPHOS  --  66  --   --   --  62  --   --  58  --   --  56  --    ALT  --  25  --   --   --  22  --   --  23  --   --  28  --    AST  --  33  --   --   --  27  --   --  33  --   --  38*  --    ALBUMIN  --  4.3  --   --   --  3.9  --   --  3.3*  --   --  3.2*   "--    TRIG  --  124  --   --   --   --   --   --   --   --   --   --   --    HGBA1C  --   --   --   --   --  5.6  --   --   --   --   --   --   --    WBC  --   --   --  9.10  --  25.65  25.65*  --  21.71* 15.78*  --   --  18.52*  --    HGB  --   --   --  16.0  --  14.8  --  13.5* 13.0*  --   --  12.8*  --    HCT 51  --   --  48.4  --  43.8  --  39.8* 39.8*  --   --  40.0*  --     < > = values in this interval not displayed.     Nutrition Orders:  Diet NPO      Appetite/Oral Intake: not applicable/not applicable  Factors Affecting Nutritional Intake: on mechanical ventilation  Food/Congregational/Cultural Preferences: unable to obtain  Food Allergies: none reported  Last Bowel Movement: 12/19/23  Wound(s):  incision noted    Comments    12/21/23: Discussed with RN. Will provide tube feeding recommendations for when appropriate to start tube feeding. Receiving kcal from meds.      12/22/23: Patient remains intubated, receiving kcal from meds. Cleviprex d/c'ed this morning, Diprivan continues. Patient currently with OG tube to LIS.     Anthropometrics    Height: 5' 10.98" (180.3 cm),    Last Weight: 110 kg (242 lb 8.1 oz) (12/19/23 1645), Weight Method: Bed Scale  BMI (Calculated): 33.8  BMI Classification: obese grade I (BMI 30-34.9)        Ideal Body Weight (IBW), Male: 171.88 lb     % Ideal Body Weight, Male (lb): 140.99 %                          Usual Weight Provided By: unable to obtain usual weight    Wt Readings from Last 5 Encounters:   12/19/23 110 kg (242 lb 8.1 oz)   12/11/23 112.7 kg (248 lb 7.3 oz)   12/05/23 112.3 kg (247 lb 9.6 oz)   11/07/23 109.5 kg (241 lb 6.5 oz)   10/30/23 113.9 kg (251 lb 1.7 oz)     Weight Change(s) Since Admission:   Wt Readings from Last 1 Encounters:   12/19/23 1645 110 kg (242 lb 8.1 oz)   12/19/23 1053 110 kg (242 lb 8.1 oz)   Admit Weight: 110 kg (242 lb 8.1 oz) (12/19/23 1053), Weight Method: Bed Scale    Estimated Needs    Weight Used For Calorie Calculations: 110 kg (242 lb " 8.1 oz)  Energy Calorie Requirements (kcal): 1210-1540kcal (11-14kcal/kg)  Energy Need Method: Kcal/kg  Weight Used For Protein Calculations: 78.2 kg (172 lb 6.4 oz) (IBW)  Protein Requirements: 157gm (2g/kg IBW)  Fluid Requirements (mL): per MD (on 3%NS)    Enteral Nutrition     Patient not receiving enteral nutrition at this time.    Parenteral Nutrition     Patient not receiving parenteral nutrition support at this time.    Evaluation of Received Nutrient Intake    Calories: not meeting estimated needs  Protein: not meeting estimated needs    Patient Education     Not applicable.    Nutrition Diagnosis     PES: Inadequate oral intake related to acute illness as evidenced by intubation since 12/19/23. (new)     Nutrition Interventions     Intervention(s): modified composition of enteral nutrition, modified rate of enteral nutrition, and collaboration with other providers    Goal: Meet greater than 80% of nutritional needs by follow-up. (new)  Goal: Tolerate enteral feeding at goal rate by follow-up. (new)    Nutrition Goals & Monitoring     Dietitian will monitor: energy intake    Nutrition Risk/Follow-Up: high (follow-up in 1-4 days)   Please consult if re-assessment needed sooner.

## 2023-12-22 NOTE — PT/OT/SLP PROGRESS
Spoke with RN, pt remains not appropriate for therapy, still intubated/sedated. OT signing off at this time. Please reconsult when appropriate.

## 2023-12-22 NOTE — PROGRESS NOTES
[facility] - [facility]  Neurology  Progress Note    Patient Name: Delio Daniel Jr.  MRN: [id number]  Admission Date: 12/19/2023  Hospital Length of Stay: 3 days  Code Status: Full Code   Attending Provider: Italo Orozco MD  Primary Care Physician: Candy, Primary Doctor   Principal Problem:Stroke    Overview/Hospital Course:  12/19/23:  Presented to [facility] ED with left sided weakness and slurred speech.  Initial NIH 18.  Underwent successful Right ICA thrombectomy (TICI 3).  12/20/23:  Had sustained episodes of AFib RVR overnight and was started on Cardizem drip.  Repeat CT head revealing increasing mass effect with a 1.6 cm midline shift.  He was intubated for airway protection.  Neurosurgery was consulted and started on mannitol and hypertonic saline.  He underwent right hemicraniectomy later that evening.        Subjective:     Interval History:   Nursing reports no new issues, some spontaneous movement of RUE and RLE overnight. Remains intubated and sedated.     Current Neurological Medications:     Current Facility-Administered Medications   Medication Dose Route Frequency Provider Last Rate Last Admin    0.9%  NaCl infusion (for blood administration)   Intravenous Q24H PRN Fidel Kraus, AGACNP-BC        0.9%  NaCl infusion   Intravenous Continuous Mary, Ijeoma B, NP 75 mL/hr at 12/22/23 0555 Rate Verify at 12/22/23 0555    aspirin suppository 300 mg  300 mg Rectal Daily Mary, Ijeoma B, NP   300 mg at 12/20/23 0905    dexmedetomidine (PRECEDEX) 400mcg/100mL 0.9% NaCL infusion  0-1.4 mcg/kg/hr Intravenous Continuous Italo Orozco MD   Stopped at 12/20/23 2104    dextrose 10% bolus 125 mL 125 mL  12.5 g Intravenous PRN Eleazar Westbrook MD        dextrose 10% bolus 250 mL 250 mL  25 g Intravenous PRN Eleazar Westbrook MD        digoxin injection 250 mcg  250 mcg Intravenous Once Leopoldo Mullins FNP        digoxin injection 500 mcg  500 mcg Intravenous Once  Leopoldo Mullins T, FNP        diltiaZEM 125 mg in dextrose 5 % 125 mL IVPB (ready to mix) (titrating)  0-15 mg/hr Intravenous Continuous SusannaLeopoldo murray T, FNP        diltiaZEM injection 10 mg  10 mg Intravenous Once Leopoldo Mullins, FNP        fentaNYL injection 50 mcg  50 mcg Intravenous Q1H PRN Eleazar Westbrook MD   50 mcg at 12/21/23 1628    glucagon (human recombinant) injection 1 mg  1 mg Intramuscular PRN Eleazar Westbrook MD        hydrALAZINE injection 10 mg  10 mg Intravenous Q4H PRN Gasper Barlow MD        And    labetaloL injection 10 mg  10 mg Intravenous Q4H PRN Gasper Barlow MD   10 mg at 12/22/23 0138    insulin aspart U-100 injection 0-5 Units  0-5 Units Subcutaneous Q6H PRN Eleazar Westbrook MD   2 Units at 12/20/23 0624    levETIRAcetam in NaCl (iso-os) IVPB 1,000 mg  1,000 mg Intravenous Q12H Italo Orozco  mL/hr at 12/22/23 0917 1,000 mg at 12/22/23 0917    mannitol 20% infusion 75 g  75 g Intravenous Once Fidel Kraus, AGACNP-BC        metoprolol injection 5 mg  5 mg Intravenous Q5 Min PRN Patrick Gipson DO   5 mg at 12/22/23 0904    mupirocin 2 % ointment   Nasal BID Italo Orozco MD   Given at 12/22/23 0916    NORepinephrine 8 mg in dextrose 5% 250 mL infusion  0-3 mcg/kg/min Intravenous Continuous Italo Orozco MD   Stopped at 12/20/23 1929    ondansetron injection 8 mg  8 mg Intravenous Q6H PRN Patrick Gipson DO   8 mg at 12/20/23 0045    propofol (DIPRIVAN) 10 mg/mL infusion  0-50 mcg/kg/min Intravenous Continuous Gasper Barlow MD 13.2 mL/hr at 12/22/23 0555 20 mcg/kg/min at 12/22/23 0555    sodium chloride 0.9% flush 10 mL  10 mL Intravenous PRN Ijeoma Hernandez, GIANNI           Review of Systems   Unable to perform ROS: Acuity of condition     Objective:     Vital Signs (Most Recent):  Temp: 99.5 °F (37.5 °C) (12/22/23 0000)  Pulse: (!) 124 (12/22/23 0630)  Resp: 18 (12/22/23 0630)  BP: (!) 140/98 (12/22/23 0615)  SpO2: 100 % (12/22/23  0630) Vital Signs (24h Range):  Temp:  [99 °F (37.2 °C)-99.5 °F (37.5 °C)] 99.5 °F (37.5 °C)  Pulse:  [] 124  Resp:  [3-34] 18  SpO2:  [95 %-100 %] 100 %  BP: (109-166)/() 140/98     Weight: 110 kg (242 lb 8.1 oz)  Body mass index is 33.84 kg/m².     Physical Exam  Vitals and nursing note reviewed.   Constitutional:       Interventions: He is sedated and intubated.   Eyes:      Pupils: Pupils are equal, round, and reactive to light.   Cardiovascular:      Rate and Rhythm: Tachycardia present. Rhythm irregular.   Pulmonary:      Effort: He is intubated.   Musculoskeletal:      Right lower leg: No edema.      Left lower leg: No edema.   Skin:     General: Skin is warm and dry.      Capillary Refill: Capillary refill takes less than 2 seconds.   Neurological:      Comments:     Intubated and sedated on mechanical ventilation   Very limited PE  PERR, gaze midline  Withdraws from painful stim RUE, RLE  No response to pain LUE, LLE   Does not participate in exam, does not follow commands        NEUROLOGICAL EXAMINATION:     CRANIAL NERVES     CN III, IV, VI   Pupils are equal, round, and reactive to light.      Significant Labs:   Recent Lab Results         12/22/23  0538   12/22/23  0018   12/21/23  1731   12/21/23  1401        Albumin/Globulin Ratio   1.1           Albumin   3.2           ALP   56           ALT   28           AST   38           Baso #   0.06           Basophil %   0.3           BILIRUBIN TOTAL   1.4           BUN   10.2           Calcium   8.0           Chloride   118           CO2   25           Creatinine   0.97           eGFR   >60           Eos #   0.30           Eosinophil %   1.6           Globulin, Total   2.9           Glucose   147           Hematocrit   40.0           Hemoglobin   12.8           Immature Grans (Abs)   0.12           Immature Granulocytes   0.6           Lymph #   1.20           LYMPH %   6.5           Magnesium    2.20           MCH   29.8           MCHC   32.0            MCV   93.0           Mono #   1.56           Mono %   8.4           MPV   10.1           Neut #   15.28           Neut %   82.6           nRBC   0.0           Osmolality 329   313   305         Phosphorus Level   1.9           Platelet Count   156           POCT Glucose       163       Potassium   3.6           PROTEIN TOTAL   6.1           RBC   4.30           RDW   15.7           Sodium 163  Comment: Critical Result called and verified by verbal readback to: augustine Contreras on 12/22/2023 at 06:27. Reported by [id number].   150   147         WBC   18.52                   Significant Imaging: I have reviewed all pertinent imaging results/findings within the past 24 hours.  Assessment and Plan:     * Stroke  - presented with left sided weakness and slurred speech.  Initial NIH 18.  - stroke RF: AFib (on Xarelto), HTN, HLD, CAD, obesity, former smoker  - intervention:  Underwent successful Right ICA thrombectomy (TICI 3).  - etiology: concern for cardioembolic    Stroke workup:  -CTh (12/19/23 1121):  No acute intracranial findings or significant interval change compared to May 2023.  -CTA h/n (12/19/23  1146):   1.  Irregular appearance of the right internal carotid artery near the skull base and petrous portion.  This is of uncertain etiology.  This could be related to soft atheromatous plaque, ill-defined dissection flap or artifact.  2.  Poor enhancement of the right anterior circulation including the MCA and HÉCTOR on arterial phase.  There is delayed enhancement of the right HÉCTOR and MCA seen on delayed phase postcontrast imaging suggesting upstream/inflow abnormality.  3.  Very subtle asymmetric loss of gray-white differentiation in the right cerebral hemisphere most pronounced at the frontal lobe and basal ganglia.  No appreciable hemorrhage.  -ECHO: EF 50-55%, bubble study negative, mildly dilated LA  -CUS: bilateral ICA less than 50% stenosis  -LDL: 70  -A1c: 5.6  -TSH: 1.246  -home medications include ASA  81mg daily, Atorvastatin 40mg daily, and Xarelto 20mg daily  -CTh (12/20/23 0905):  Worsening exam with development of 1.6 cm of right to left midline shift.  -CTh (12/21/23 0322):  1. Postoperative changes following right-sided craniectomy.  2. Evolving ischemic changes in the right cerebral hemisphere with interval development of hemorrhagic transformation.  3. Interval improvement of mass effect.      Plan:  - hypertonic saline per neurosurgery, currently on hold 2/2 hypernatremia   - continue Keppra 1000mg BID  - no anticoagulation until day 14 (12/3). Will have to obtain CTH prior to initiating NOAC.   - ok for aspirin 81 mg daily, discussed with neurosurgery who is also ok for aspirin  - from a neurology standpoint, SBP less than 160 unless otherwise directed by neurosurgery   - He will likely require trach and PEG, ICU to discuss long term goals with family   -will follow peripherally, will be available as needed       Further recommendations to follow by MD.      VTE Risk Mitigation (From admission, onward)           Ordered     Reason for No Pharmacological VTE Prophylaxis  Once        Question:  Reasons:  Answer:  Risk of Bleeding    12/19/23 1551     IP VTE HIGH RISK PATIENT  Once         12/19/23 1551     Place sequential compression device  Until discontinued         12/19/23 1551                    Maria Victoria Rosales NP  Neurology  [facility] - [facility]

## 2023-12-22 NOTE — PROGRESS NOTES
[facility] - [facility]  Pulmonary Critical Care Note    Patient Name: Delio Daniel Jr.  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 His last known normal was 9:15 per EMS. Stroke protocol in ED initiated. Unofficial CT head showed possible dense R MCA. Pt taken to cath lab for BRAD thrombectomy. Admitted to ICU for post-operative care and monitoring.     Hospital Course/Significant events:  12/19/2023 - right internal carotid artery thrombectomy  12/20/2023 - s/p craniectomy    24 Hour Interval History:  Patient noted to have right eye swelling though . Pending repeat CT Head results this am. Sodium noted to be 163. Turned off hypertonic saline. Pupils reactive. Cough, Corneal intact. Cleviprex 4 mg/hr. Propofol 20mcg/kg/hr. Otherwise NAEON.    Past Medical History:   Diagnosis Date    Arthritis     Atrial fibrillation     BPH (benign prostatic hyperplasia)     Cardiac arrest     Coronary artery disease     Cyst, kidney, acquired     Diverticulosis     Hyperlipidemia     Hypertension     MI (myocardial infarction)     Obesity     Steatosis of liver        Past Surgical History:   Procedure Laterality Date    A-V CARDIAC PACEMAKER INSERTION Right     CARDIAC CATHETERIZATION      COLONOSCOPY W/ BIOPSIES      CRANIECTOMY Right 12/20/2023    Procedure: CRANIECTOMY;  Surgeon: Artem Can MD;  Location: [facility] OR;  Service:  Neurosurgery;  Laterality: Right;    excision of colon         Social History     Socioeconomic History    Marital status:     Number of children: 9   Occupational History    Occupation: retired   Tobacco Use    Smoking status: Former    Smokeless tobacco: Never   Substance and Sexual Activity    Alcohol use: Not Currently    Drug use: Not Currently    Sexual activity: Not Currently     Partners: Female     Social Determinants of Health     Financial Resource Strain: Low Risk  (10/19/2022)    Overall Financial Resource Strain (CARDIA)     Difficulty of Paying Living Expenses: Not hard at all   Food Insecurity: No Food Insecurity (10/19/2022)    Hunger Vital Sign     Worried About Running Out of Food in the Last Year: Never true     Ran Out of Food in the Last Year: Never true   Transportation Needs: No Transportation Needs (10/19/2022)    PRAPARE - Transportation     Lack of Transportation (Medical): No     Lack of Transportation (Non-Medical): No   Physical Activity: Sufficiently Active (10/19/2022)    Exercise Vital Sign     Days of Exercise per Week: 6 days     Minutes of Exercise per Session: 60 min   Stress: No Stress Concern Present (10/19/2022)    [employer] [place] [employer] - Occupational Stress Questionnaire     Feeling of Stress : Not at all   Social Connections: Unknown (10/19/2022)    Social Connection and Isolation Panel [NHANES]     Frequency of Communication with Friends and Family: More than three times a week     Frequency of Social Gatherings with Friends and Family: More than three times a week     Attends Judaism Services: More than 4 times per year     Active Member of Clubs or Organizations: No     Attends Club or Organization Meetings: Never   Housing Stability: Low Risk  (10/19/2022)    Housing Stability Vital Sign     Unable to Pay for Housing in the Last Year: No     Number of Places Lived in the Last Year: 1     Unstable Housing in the Last Year: No           Current  Outpatient Medications   Medication Instructions    albuterol (PROVENTIL/VENTOLIN HFA) 90 mcg/actuation inhaler 2 puffs, Inhalation, Every 6 hours PRN, Rescue    aspirin 81 MG Chew chew and swallow 1 tablet by mouth daily    atorvastatin (LIPITOR) 40 mg, Oral, Daily    cetirizine (ZYRTEC) 10 mg, Oral, Daily    diltiaZEM (CARDIZEM CD) 360 mg, Oral, Daily    losartan (COZAAR) 50 mg, Oral, Daily    metoprolol succinate (TOPROL-XL) 200 mg, Oral, 2 times daily    omeprazole (PRILOSEC) 20 mg, Oral, Daily, One tab by mouth daily    potassium chloride (KLOR-CON) 20 mEq Pack 20 mEq, Oral, Daily    spironolactone (ALDACTONE) 50 mg, Oral, Daily    torsemide (DEMADEX) 10 mg, Oral, Daily    vitamin D (VITAMIN D3) 1,000 Units, Oral, Daily    XARELTO 20 mg Tab TAKE 1 TABLET BY MOUTH DAILY with SUPPER       Current Inpatient Medications   aspirin  300 mg Rectal Daily    diltiaZEM  10 mg Intravenous Once    levETIRAcetam (Keppra) IV (PEDS and ADULTS)  1,000 mg Intravenous Q12H    mannitol 20%  75 g Intravenous Once    mupirocin   Nasal BID       Current Intravenous Infusions   sodium chloride 0.9% 75 mL/hr at 12/22/23 0555    clevidipine 2 mg/hr (12/22/23 0555)    dexmedeTOMIDine (Precedex) infusion (titrating) Stopped (12/20/23 2104)    dilTIAZem 10 mg/hr (12/20/23 1146)    NORepinephrine bitartrate-D5W Stopped (12/20/23 1929)    propofoL 20 mcg/kg/min (12/22/23 0555)    sodium chloride 3% HYPERTONIC 30 mL/hr (12/22/23 0555)         ROS       Objective:       Intake/Output Summary (Last 24 hours) at 12/22/2023 0626  Last data filed at 12/22/2023 0555  Gross per 24 hour   Intake 3275.55 ml   Output 2170 ml   Net 1105.55 ml           Vital Signs (Most Recent):  Temp: 99.5 °F (37.5 °C) (12/22/23 0000)  Pulse: 110 (12/22/23 0615)  Resp: 20 (12/22/23 0615)  BP: (!) 140/98 (12/22/23 0615)  SpO2: 99 % (12/22/23 0615)  Body mass index is 33.84 kg/m².  Weight: 110 kg (242 lb 8.1 oz) Vital Signs (24h Range):  Temp:  [99 °F (37.2 °C)-99.5 °F  (37.5 °C)] 99.5 °F (37.5 °C)  Pulse:  [] 110  Resp:  [3-34] 20  SpO2:  [95 %-100 %] 99 %  BP: (109-166)/() 140/98     Physical Exam  General:  no acute respiratory distress  Head:  Dressing around head intact, non bloody  Eyes: PERRL, EOMI, anicteric sclera  Neck: supple, normal ROM, no thyromegaly   CVS:  Irregularly irregular   Resp:  Lungs clear to auscultation bilaterally, no wheezes, rales, or rhonci  GI:  Abdomen soft, non-tender, non-distended, normoactive bowel sounds  Skin:  No rashes, ulcers, erythema  Neuro:  Pupillary and corneal reflexes intact. Gag intact.    Lines/Drains/Airways       Central Venous Catheter Line  Duration             Percutaneous Central Line Insertion/Assessment - Triple Lumen  12/20/23 1056 Internal Jugular Right 1 day              Drain  Duration                  Closed/Suction Drain Right Scalp -- days         NG/OG Tube 12/20/23 0930 16 Fr. Center mouth 1 day         Urethral Catheter 12/20/23 1007 1 day              Airway  Duration                  Airway - Non-Surgical 12/20/23 0930 Endotracheal Tube 1 day              Arterial Line  Duration             Arterial Line Left Radial -- days              Peripheral Intravenous Line  Duration                  Peripheral IV - Single Lumen 12/19/23 1045 20 G Lateral;Left Breast 2 days         Peripheral IV - Single Lumen 12/19/23 1050 18 G Anterior;Distal;Left Upper Arm 2 days         Peripheral IV - Single Lumen 12/19/23 1053 20 G Right Antecubital 2 days                    Significant Labs:    Lab Results   Component Value Date    WBC 18.52 (H) 12/22/2023    HGB 12.8 (L) 12/22/2023    HCT 40.0 (L) 12/22/2023    MCV 93.0 12/22/2023     12/22/2023         BMP  Lab Results   Component Value Date     (H) 12/22/2023    K 3.6 12/22/2023    CHLORIDE 118 (H) 12/22/2023    CO2 25 12/22/2023    BUN 10.2 12/22/2023    CREATININE 0.97 12/22/2023    CALCIUM 8.0 (L) 12/22/2023    AGAP 8.0 12/20/2023    EGFRNONAA 61  04/23/2022       ABG  Recent Labs   Lab 12/21/23 0618   PH 7.430   PO2 102.0*   PCO2 40.0   HCO3 26.5*         Mechanical Ventilation Support:  Vent Mode: A/C (12/22/23 0452)  Set Rate: 20 BPM (12/22/23 0452)  Vt Set: 475 mL (12/22/23 0452)  PEEP/CPAP: 5 cmH20 (12/22/23 0452)  Oxygen Concentration (%): 40 (12/22/23 0452)  Peak Airway Pressure: 31 cmH20 (12/22/23 0452)  Total Ve: 9.2 L/m (12/22/23 0238)  F/VT Ratio<105 (RSBI): (!) 49.02 (12/22/23 0452)    Significant Imaging:  I have reviewed the pertinent imaging within the past 24 hours.        Assessment/Plan:     Assessment  CVA s/p right ICA and MCA M3  branch thrombectomy s/p craniectomy with hemorrhagic conversion  Acute hypoxic respiratory failure requiring intubation  Atrial fibrillation  Onychomycosis  CAD  Hypertension  Hyperlipidemia  Elevated TSH  ADA      Plan  -q.1h neuro checks  -patient is currently on Cardizem drip for Afib  -echocardiogram EF 50-55% without intracardiac shunt  -replete potassium and magnesium.  Keep magnesium greater than 2  -continue hypertonic saline, 150-155 goal (supratherapeutic this am)  -patient is currently on propofol for sedation (20mcg)  - CT head revealed worsening hemorrhagic transformation.  Neurosurgery was notified awaiting further recommendations, pending repeat this am    DVT Prophylaxis:  SCDs  GI Prophylaxis:  None     33 minutes of critical care was time spent personally by me on the following activities: development of treatment plan with patient or surrogate and bedside caregivers, discussions with consultants, evaluation of patient's response to treatment, examination of patient, ordering and performing treatments and interventions, ordering and review of laboratory studies, ordering and review of radiographic studies, pulse oximetry, re-evaluation of patient's condition.  This critical care time did not overlap with that of any other provider or involve time for any procedures.     Collins Lainez,  MD  Pulmonary Critical Care Medicine  [address]

## 2023-12-22 NOTE — CONSULTS
Inpatient consult to Cardiology  Consult performed by: Leopoldo Mullins FNP  Consult ordered by: KODI Drake MD  Reason for consult: AF RVR        [facility] - [facility]    Cardiology  Consult Note    Patient Name: Delio Daniel Jr.  MRN: [id number]  Admission Date: 12/19/2023  Hospital Length of Stay: 3 days  Code Status: Full Code   Attending Provider: Italo Orozco MD   Consulting Provider: TRUMAN Erickson  Primary Care Physician: Candy, Primary Doctor  Principal Problem:Stroke    Patient information was obtained from past medical records, ER records, and primary team.     Subjective:   Consultation Reason: AF RVR    HPI:   Mr. Daniel is a 68 year old male, known to [facility] at [facility] (underwent cath by Dr. Marrero in 2018), who presented to the hospital on 12.19.23 with left facial droop, slurred speech, left sided hemiparesis, and fixed right sided gaze. Stroke Protocol was initiated. CT Head revealed possible dense right MCA Territory stroke. He was taken to cath lab where he underwent BRAD Thrombectomy. He experienced hemorrhagic conversion requiring craniectomy. Also required intubation/mechanical ventilation. He does have atrial fibrillation and was started on Cardizem infusion for acute HR Control. [facility] consulted for AF Management.    PMH: Persistent AF (Xarelto), Hypertension, CAD (STEMI 2003), Heart Failure with Preserved EF (50-55%), Second Degree AV Block S/P ICD, VF due to Prolonged QT Interval, Hypokalemia, BPH, Fatty Liver Disease, Hyperlipidemia, MI, Obesity, Steatosis of Liver, Cardiac Arrest, Arthritis  PSH: LHC, Colonoscopy, Device Placement (Dual Chamber ICD)  Family History: Mother- Hypertension, Father- Hypertension, Sister- Hypertension  Social History: Tobacco- Former Smoker, Alcohol- Negative, Substance Abuse- Negative     Previous Cardiac Diagnostics:   Carotid US (12.19.23):  The right internal carotid artery demonstrated less than 50% stenosis.  The left internal carotid artery  demonstrated less than 50% stenosis.  The bilateral vertebral arteries were patent with antegrade flow.  Bilateral internal carotid arteries demonstrated decreased velocities starting from the common carotid arteries.     Echocardiogram (12.19.23):  Left Ventricle: There is moderate concentric hypertrophy. Normal wall motion. There is low normal systolic function with a visually estimated ejection fraction of 50 - 55%. Unable to assess diastolic function due to atrial fibrillation. Elevated left ventricular filling pressure.  Right Ventricle: Normal right ventricular cavity size. Systolic function is mildly reduced.  Left Atrium: Left atrium is mildly dilated. Agitated saline study of the atrial septum is negative, suggesting absence of intracardiac shunt at the atrial level.  Right Atrium: Right atrium is dilated.  Mitral Valve: There is mild to moderate regurgitation with an anteromedial eccentrically directed jet.  Negative bubble study.    CV US Venous Doppler (9.28.23):  There is no evidence of a right lower extremity DVT.  There is no evidence of a left lower extremity DVT.  The contralateral (left) common femoral vein is patent.  There is a right subcutaneous edema located in the proximal thigh, middle thigh, distal thigh, proximal calf and distal calf veins.  The contralateral (right) common femoral vein is patent.  There is a left subcutaneous edema located in the proximal thigh, middle thigh, distal thigh, proximal calf and distal calf veins.  There was no deep vein thrombosis identified in the visualized veins of the bilateral lower extremities.      Echocardiogram (8.15.23):  Technically difficult study.  Optison contrast used.  Rhythm is atrial fibrillation.  Left Ventricle: There is normal systolic function with a visually estimated ejection fraction of 55 - 60%. Unable to assess due to atrial fibrillation.  Left Atrium: Left atrium is dilated.  Right Ventricle: Normal right ventricular cavity  size.  Aortic Valve: The aortic valve is a trileaflet valve.  Mitral Valve: There is mild regurgitation with an anteromedial eccentrically directed jet.    Left Heart Catheterization (3.8.18):  Normal Coronary Angiogram.  Normal LVEDP with No Aortic Stenosis.  EF 60% with No Segmental Wall Motion Abnormalities.  No MR.     Review of patient's allergies indicates:  No Known Allergies    No current facility-administered medications on file prior to encounter.     Current Outpatient Medications on File Prior to Encounter   Medication Sig    aspirin 81 MG Chew chew and swallow 1 tablet by mouth daily    atorvastatin (LIPITOR) 40 MG tablet Take 1 tablet (40 mg total) by mouth once daily.    diltiaZEM (CARDIZEM CD) 360 MG 24 hr capsule Take 1 capsule (360 mg total) by mouth once daily.    losartan (COZAAR) 50 MG tablet Take 1 tablet (50 mg total) by mouth once daily.    metoprolol succinate (TOPROL-XL) 200 MG 24 hr tablet Take 1 tablet (200 mg total) by mouth 2 (two) times daily.    omeprazole (PRILOSEC) 20 MG capsule Take 1 capsule (20 mg total) by mouth once daily. One tab by mouth daily    potassium chloride (KLOR-CON) 20 mEq Pack Take 20 mEq by mouth once daily.    spironolactone (ALDACTONE) 50 MG tablet Take 1 tablet (50 mg total) by mouth once daily.    torsemide (DEMADEX) 10 MG Tab Take 1 tablet (10 mg total) by mouth once daily.    vitamin D (VITAMIN D3) 1000 units Tab Take 1 tablet (1,000 Units total) by mouth once daily.    XARELTO 20 mg Tab TAKE 1 TABLET BY MOUTH DAILY with SUPPER    albuterol (PROVENTIL/VENTOLIN HFA) 90 mcg/actuation inhaler Inhale 2 puffs into the lungs every 6 (six) hours as needed for Wheezing. Rescue (Patient not taking: Reported on 8/22/2023)    cetirizine (ZYRTEC) 10 MG tablet Take 1 tablet (10 mg total) by mouth once daily. for 14 days     Review of Systems   Unable to perform ROS: Intubated     Objective:     Vital Signs (Most Recent):  Temp: 99.5 °F (37.5 °C) (12/22/23 0000)  Pulse:  (!) 124 (12/22/23 0630)  Resp: 18 (12/22/23 0630)  BP: (!) 140/98 (12/22/23 0615)  SpO2: 100 % (12/22/23 0630) Vital Signs (24h Range):  Temp:  [99 °F (37.2 °C)-99.5 °F (37.5 °C)] 99.5 °F (37.5 °C)  Pulse:  [] 124  Resp:  [3-34] 18  SpO2:  [95 %-100 %] 100 %  BP: (109-166)/() 140/98     Weight: 110 kg (242 lb 8.1 oz)  Body mass index is 33.84 kg/m².    SpO2: 100 %         Intake/Output Summary (Last 24 hours) at 12/22/2023 0810  Last data filed at 12/22/2023 0653  Gross per 24 hour   Intake 3275.55 ml   Output 2270 ml   Net 1005.55 ml       Lines/Drains/Airways       Central Venous Catheter Line  Duration             Percutaneous Central Line Insertion/Assessment - Triple Lumen  12/20/23 1056 Internal Jugular Right 1 day              Drain  Duration                  Closed/Suction Drain Right Scalp -- days         NG/OG Tube 12/20/23 0930 16 Fr. Center mouth 1 day         Urethral Catheter 12/20/23 1007 1 day              Airway  Duration                  Airway - Non-Surgical 12/20/23 0930 Endotracheal Tube 1 day              Arterial Line  Duration             Arterial Line Left Radial -- days              Peripheral Intravenous Line  Duration                  Peripheral IV - Single Lumen 12/19/23 1045 20 G Lateral;Left Breast 2 days         Peripheral IV - Single Lumen 12/19/23 1050 18 G Anterior;Distal;Left Upper Arm 2 days         Peripheral IV - Single Lumen 12/19/23 1053 20 G Right Antecubital 2 days                  Significant Labs:  Recent Results (from the past 72 hour(s))   F[employer] CHEM8    Collection Time: 12/19/23 10:55 AM   Result Value Ref Range    POC Glucose 140 (H) 70 - 110 mg/dL    POC BUN 15 6 - 30 mg/dL    POC Creatinine 1.3 0.5 - 1.4 mg/dL    POC Sodium 142 136 - 145 mmol/L    POC Potassium 3.8 3.5 - 5.1 mmol/L    POC Chloride 100 95 - 110 mmol/L    POC TCO2 (MEASURED) 29 23 - 29 mmol/L    POC Anion Gap 17 (H) 8 - 16 mmol/L    POC Ionized Calcium 1.17 1.06 - 1.42 mmol/L    POC  Hematocrit 51 36 - 54 %PCV    POC HEMOGLOBIN 17 g/dL    Sample VENOUS    ISTAT PROCEDURE    Collection Time: 12/19/23 10:56 AM   Result Value Ref Range    POC PTWBT 15.3 (H) 9.7 - 14.3 sec    POC PTINR 1.3 (H) 0.9 - 1.2    Sample VENOUS    Comprehensive metabolic panel    Collection Time: 12/19/23 11:32 AM   Result Value Ref Range    Sodium Level 140 136 - 145 mmol/L    Potassium Level 4.4 3.5 - 5.1 mmol/L    Chloride 106 98 - 107 mmol/L    Carbon Dioxide 25 23 - 31 mmol/L    Glucose Level 137 (H) 82 - 115 mg/dL    Blood Urea Nitrogen 13.5 8.4 - 25.7 mg/dL    Creatinine 1.32 (H) 0.73 - 1.18 mg/dL    Calcium Level Total 9.1 8.8 - 10.0 mg/dL    Protein Total 8.1 (H) 5.8 - 7.6 gm/dL    Albumin Level 4.3 3.4 - 4.8 g/dL    Globulin 3.8 (H) 2.4 - 3.5 gm/dL    Albumin/Globulin Ratio 1.1 1.1 - 2.0 ratio    Bilirubin Total 2.1 (H) <=1.5 mg/dL    Alkaline Phosphatase 66 40 - 150 unit/L    Alanine Aminotransferase 25 0 - 55 unit/L    Aspartate Aminotransferase 33 5 - 34 unit/L    eGFR 59 mls/min/1.73/m2   Protime-INR    Collection Time: 12/19/23 11:32 AM   Result Value Ref Range    PT 16.9 (H) 12.5 - 14.5 seconds    INR 1.4 (H) <=1.3   TSH    Collection Time: 12/19/23 11:32 AM   Result Value Ref Range    TSH 9.889 (H) 0.350 - 4.940 uIU/mL   LDL - Lipid Panel    Collection Time: 12/19/23 11:32 AM   Result Value Ref Range    Cholesterol Total 125 <=200 mg/dL    HDL Cholesterol 30 (L) 35 - 60 mg/dL    Triglyceride 124 34 - 140 mg/dL    Cholesterol/HDL Ratio 4 0 - 5    Very Low Density Lipoprotein 25     LDL Cholesterol 70.00 50.00 - 140.00 mg/dL   CBC with Differential    Collection Time: 12/19/23 11:45 AM   Result Value Ref Range    WBC 9.10 4.50 - 11.50 x10(3)/mcL    RBC 5.41 4.70 - 6.10 x10(6)/mcL    Hgb 16.0 14.0 - 18.0 g/dL    Hct 48.4 42.0 - 52.0 %    MCV 89.5 80.0 - 94.0 fL    MCH 29.6 27.0 - 31.0 pg    MCHC 33.1 33.0 - 36.0 g/dL    RDW 14.4 11.5 - 17.0 %    Platelet 228 130 - 400 x10(3)/mcL    MPV 9.9 7.4 - 10.4 fL     Neut % 70.5 %    Lymph % 22.5 %    Mono % 5.9 %    Eos % 0.1 %    Basophil % 0.5 %    Lymph # 2.05 0.6 - 4.6 x10(3)/mcL    Neut # 6.40 2.1 - 9.2 x10(3)/mcL    Mono # 0.54 0.1 - 1.3 x10(3)/mcL    Eos # 0.01 0 - 0.9 x10(3)/mcL    Baso # 0.05 <=0.2 x10(3)/mcL    IG# 0.05 (H) 0 - 0.04 x10(3)/mcL    IG% 0.5 %    NRBC% 0.0 %   RT Blood Gas    Collection Time: 12/19/23  3:47 PM   Result Value Ref Range    Sample Type Arterial Blood     Sample site Arterial Line     Drawn by TP RRT     pH, Blood gas 7.350 7.350 - 7.450    pCO2, Blood gas 46.0 (H) 35.0 - 45.0 mmHg    pO2, Blood gas 76.0 (L) 80.0 - 100.0 mmHg    Sodium, Blood Gas 138 137 - 145 mmol/L    Potassium, Blood Gas 3.9 3.5 - 5.0 mmol/L    Calcium Level Ionized 1.08 (L) 1.12 - 1.23 mmol/L    Base Excess, Blood gas -0.06 -2.00 - 2.00 mmol/L    sO2, Blood gas 94.0 %    HCO3, Blood gas 25.4 22.0 - 26.0 mmol/L    Allens Test N/A    Echo Saline Bubble? Yes    Collection Time: 12/19/23  5:23 PM   Result Value Ref Range    BSA 2.35 m2    Schmidt's Biplane MOD Ejection Fraction 50 %    LVOT stroke volume 53.57 cm3    LVIDd 4.69 3.5 - 6.0 cm    LV Systolic Volume 31.10 mL    LV Systolic Volume Index 13.6 mL/m2    LVIDs 2.86 2.1 - 4.0 cm    LV Diastolic Volume 102.00 mL    LV Diastolic Volume Index 44.54 mL/m2    IVS 1.52 (A) 0.6 - 1.1 cm    LVOT diameter 2.30 cm    LVOT area 4.2 cm2    FS 39 28 - 44 %    Left Ventricle Relative Wall Thickness 0.55 cm    Posterior Wall 1.30 (A) 0.6 - 1.1 cm    LV mass 267.18 g    LV Mass Index 117 g/m2    MV Peak E Rl 0.96 m/s    LVOT peak rl 0.63 m/s    Left Ventricular Outflow Tract Mean Velocity 0.42 cm/s    Left Ventricular Outflow Tract Mean Gradient 1.00 mmHg    RVDD 3.24 cm    TAPSE 1.56 cm    LA size 4.70 cm    LA volume (mod) 88.90 cm3    LA Volume Index (Mod) 38.8 mL/m2    RA Width 4.52 cm    AV mean gradient 3 mmHg    AV peak gradient 5 mmHg    Ao peak rl 1.15 m/s    Ao VTI 19.60 cm    LVOT peak VTI 12.90 cm    AV valve area 2.73  cm²    AV Velocity Ratio 0.55     AV index (prosthetic) 0.66     WADE by Velocity Ratio 2.27 cm²    MV mean gradient 3 mmHg    MV peak gradient 5 mmHg    MV valve area by continuity eq 3.04 cm2    MV VTI 17.6 cm    ZLVIDS -4.85     ZLVIDD -6.23    CV Ultrasound Bilateral Doppler Carotid    Collection Time: 12/19/23  6:32 PM   Result Value Ref Range    Right CCA prox sys 21 cm/s    Right CCA prox ordoñez 5 cm/s    Right CCA dist sys 22 cm/s    Right CCA dist ordoñez 9 cm/s    Right ICA prox sys 19 cm/s    Right ICA prox ordoñez 4 cm/s    Right ICA mid sys 25 cm/s    Right ICA mid ordoñez 11 cm/s    Right ICA dist sys 31 cm/s    Right ICA dist ordoñez 15 cm/s    Right ECA sys 31 cm/s    Right ECA ordoñez 0 cm/s    Right vertebral sys 12 cm/s    Right ICA/CCA ratio 1.41     Right Highest ICA 31.00     Right Highest EDV 15.00     Right Highest CCA 22     Left CCA prox sys 37 cm/s    Left CCA prox ordoñez 11 cm/s    Left CCA dist sys 22 cm/s    Left CCA dist ordoñez 8 cm/s    Left ICA prox sys 18 cm/s    Left ICA prox ordoñez 7 cm/s    Left ICA mid sys 28 cm/s    Left ICA mid ordoñez 10 cm/s    Left ICA dist sys 33 cm/s    Left ICA dist ordoñez 9 cm/s    Left ECA sys 36 cm/s    Left vertebral sys 19 cm/s    Left ICA/CCA ratio 1.50     Left Highest ICA 33.00     LT Highest EDV 10.00     Left Highest CCA 37    Basic Metabolic Panel    Collection Time: 12/19/23 10:53 PM   Result Value Ref Range    Sodium Level 140 136 - 145 mmol/L    Potassium Level 3.7 3.5 - 5.1 mmol/L    Chloride 109 (H) 98 - 107 mmol/L    Carbon Dioxide 20 (L) 23 - 31 mmol/L    Glucose Level 166 (H) 82 - 115 mg/dL    Blood Urea Nitrogen 12.0 8.4 - 25.7 mg/dL    Creatinine 0.99 0.73 - 1.18 mg/dL    BUN/Creatinine Ratio 12     Calcium Level Total 8.4 (L) 8.8 - 10.0 mg/dL    Anion Gap 11.0 mEq/L    eGFR >60 mls/min/1.73/m2   Magnesium    Collection Time: 12/19/23 10:53 PM   Result Value Ref Range    Magnesium Level 1.90 1.60 - 2.60 mg/dL   Phosphorus    Collection Time: 12/19/23 10:53 PM    Result Value Ref Range    Phosphorus Level 3.1 2.3 - 4.7 mg/dL   POCT glucose    Collection Time: 12/20/23 12:09 AM   Result Value Ref Range    POCT Glucose 143 (H) 70 - 110 mg/dL   POCT glucose    Collection Time: 12/20/23  5:43 AM   Result Value Ref Range    POCT Glucose 222 (H) 70 - 110 mg/dL   POCT glucose    Collection Time: 12/20/23  5:45 AM   Result Value Ref Range    POCT Glucose 224 (H) 70 - 110 mg/dL   Comprehensive Metabolic Panel    Collection Time: 12/20/23  7:54 AM   Result Value Ref Range    Sodium Level 137 136 - 145 mmol/L    Potassium Level 3.8 3.5 - 5.1 mmol/L    Chloride 105 98 - 107 mmol/L    Carbon Dioxide 22 (L) 23 - 31 mmol/L    Glucose Level 207 (H) 82 - 115 mg/dL    Blood Urea Nitrogen 10.4 8.4 - 25.7 mg/dL    Creatinine 0.94 0.73 - 1.18 mg/dL    Calcium Level Total 8.5 (L) 8.8 - 10.0 mg/dL    Protein Total 7.0 5.8 - 7.6 gm/dL    Albumin Level 3.9 3.4 - 4.8 g/dL    Globulin 3.1 2.4 - 3.5 gm/dL    Albumin/Globulin Ratio 1.3 1.1 - 2.0 ratio    Bilirubin Total 1.8 (H) <=1.5 mg/dL    Alkaline Phosphatase 62 40 - 150 unit/L    Alanine Aminotransferase 22 0 - 55 unit/L    Aspartate Aminotransferase 27 5 - 34 unit/L    eGFR >60 mls/min/1.73/m2   Magnesium    Collection Time: 12/20/23  7:54 AM   Result Value Ref Range    Magnesium Level 2.10 1.60 - 2.60 mg/dL   Phosphorus    Collection Time: 12/20/23  7:54 AM   Result Value Ref Range    Phosphorus Level 2.0 (L) 2.3 - 4.7 mg/dL   CBC with Differential    Collection Time: 12/20/23  7:54 AM   Result Value Ref Range    WBC 25.65 (H) 4.50 - 11.50 x10(3)/mcL    RBC 4.98 4.70 - 6.10 x10(6)/mcL    Hgb 14.8 14.0 - 18.0 g/dL    Hct 43.8 42.0 - 52.0 %    MCV 88.0 80.0 - 94.0 fL    MCH 29.7 27.0 - 31.0 pg    MCHC 33.8 33.0 - 36.0 g/dL    RDW 14.5 11.5 - 17.0 %    Platelet 225 130 - 400 x10(3)/mcL    MPV 10.1 7.4 - 10.4 fL    NRBC% 0.0 %   Hemoglobin A1C    Collection Time: 12/20/23  7:54 AM   Result Value Ref Range    Hemoglobin A1c 5.6 <=7.0 %    Estimated  Average Glucose 114.0 mg/dL   TSH    Collection Time: 12/20/23  7:54 AM   Result Value Ref Range    TSH 1.246 0.350 - 4.940 uIU/mL   T3, Free (OL[facility])    Collection Time: 12/20/23  7:54 AM   Result Value Ref Range    T3 Free 2.38 1.58 - 3.91 pg/mL   T4, Free    Collection Time: 12/20/23  7:54 AM   Result Value Ref Range    Thyroxine Free 1.02 0.70 - 1.48 ng/dL   Manual Differential    Collection Time: 12/20/23  7:54 AM   Result Value Ref Range    WBC 25.65 x10(3)/mcL    Neutrophils % 88 %    Lymphs % 5 %    Monocytes % 7 %    Neutrophils Abs 22.572 (H) 2.1 - 9.2 x10(3)/mcL    Lymphs Abs 1.2825 0.6 - 4.6 x10(3)/mcL    Monocytes Abs 1.7955 (H) 0.1 - 1.3 x10(3)/mcL    Platelets Normal Normal, Adequate    RBC Morph Normal Normal   Protime-INR    Collection Time: 12/20/23 10:30 AM   Result Value Ref Range    PT 14.9 (H) 12.5 - 14.5 seconds    INR 1.2 <=1.3   APTT    Collection Time: 12/20/23 10:30 AM   Result Value Ref Range    PTT 28.5 23.2 - 33.7 seconds   Sodium    Collection Time: 12/20/23 12:09 PM   Result Value Ref Range    Sodium Level 133 (L) 136 - 145 mmol/L   Osmolality, Serum    Collection Time: 12/20/23 12:09 PM   Result Value Ref Range    Osmolality 312 (H) 280 - 300 mOsm/kg   POCT glucose    Collection Time: 12/20/23 12:49 PM   Result Value Ref Range    POCT Glucose 192 (H) 70 - 110 mg/dL   Prepare RBC 2 Units; prepare for surgery    Collection Time: 12/20/23  3:51 PM   Result Value Ref Range    UNIT NUMBER [id number]     UNIT ABO/RH A POS     DISPENSE STATUS Selected     Unit Expiration [id number]     Product Code F7750U43     Unit Blood Type Code 6200     CROSSMATCH INTERPRETATION Compatible     UNIT NUMBER [id number]     UNIT ABO/RH A POS     DISPENSE STATUS Selected     Unit Expiration [id number]     Product Code Y2980S10     Unit Blood Type Code 6200     CROSSMATCH INTERPRETATION Compatible    Type & Screen    Collection Time: 12/20/23  3:51 PM   Result Value Ref Range    Group & Rh A POS      Indirect Kaila GEL NEG     Specimen Outdate 12/23/2023 23:59    Tissue Culture - Aerobic    Collection Time: 12/20/23  5:52 PM    Specimen: Skull; Bone   Result Value Ref Range    CULTURE, TISSUE- AEROBIC ([facility]) No Growth At 48 Hours    Osmolality, Serum    Collection Time: 12/20/23  7:03 PM   Result Value Ref Range    Osmolality 322 (HH) 280 - 300 mOsm/kg   Basic metabolic panel    Collection Time: 12/20/23  7:03 PM   Result Value Ref Range    Sodium Level 144 136 - 145 mmol/L    Potassium Level 3.8 3.5 - 5.1 mmol/L    Chloride 113 (H) 98 - 107 mmol/L    Carbon Dioxide 23 23 - 31 mmol/L    Glucose Level 135 (H) 82 - 115 mg/dL    Blood Urea Nitrogen 10.0 8.4 - 25.7 mg/dL    Creatinine 1.02 0.73 - 1.18 mg/dL    BUN/Creatinine Ratio 10     Calcium Level Total 8.0 (L) 8.8 - 10.0 mg/dL    Anion Gap 8.0 mEq/L    eGFR >60 mls/min/1.73/m2   CBC with Differential    Collection Time: 12/20/23  7:03 PM   Result Value Ref Range    WBC 21.71 (H) 4.50 - 11.50 x10(3)/mcL    RBC 4.50 (L) 4.70 - 6.10 x10(6)/mcL    Hgb 13.5 (L) 14.0 - 18.0 g/dL    Hct 39.8 (L) 42.0 - 52.0 %    MCV 88.4 80.0 - 94.0 fL    MCH 30.0 27.0 - 31.0 pg    MCHC 33.9 33.0 - 36.0 g/dL    RDW 14.9 11.5 - 17.0 %    Platelet 206 130 - 400 x10(3)/mcL    MPV 10.0 7.4 - 10.4 fL    Neut % 87.1 %    Lymph % 5.3 %    Mono % 6.6 %    Eos % 0.0 %    Basophil % 0.3 %    Lymph # 1.16 0.6 - 4.6 x10(3)/mcL    Neut # 18.89 (H) 2.1 - 9.2 x10(3)/mcL    Mono # 1.43 (H) 0.1 - 1.3 x10(3)/mcL    Eos # 0.01 0 - 0.9 x10(3)/mcL    Baso # 0.06 <=0.2 x10(3)/mcL    IG# 0.16 (H) 0 - 0.04 x10(3)/mcL    IG% 0.7 %    NRBC% 0.0 %   RT Blood Gas    Collection Time: 12/20/23  8:38 PM   Result Value Ref Range    Sample Type Arterial Blood     Sample site Arterial Line     Drawn by pearl rt     pH, Blood gas 7.420 7.350 - 7.450    pCO2, Blood gas 41.0 35.0 - 45.0 mmHg    pO2, Blood gas 103.0 (H) 80.0 - 100.0 mmHg    Sodium, Blood Gas 144 137 - 145 mmol/L    Potassium, Blood Gas 3.7 3.5 - 5.0 mmol/L     Calcium Level Ionized 1.09 (L) 1.12 - 1.23 mmol/L    TOC2, Blood gas 27.9 mmol/L    Base Excess, Blood gas 1.90 mmol/L    sO2, Blood gas 98.0 %    HCO3, Blood gas 26.6 (H) 22.0 - 26.0 mmol/L    Allens Test N/A     MODE AC     Oxygen Device, Blood gas Ventilator     FIO2, Blood gas 50 %    Mech Vt 475 ml    Mech RR 20 b/min    PEEP 5.0 cmH2O    Flow 50 LPM   Osmolality, Serum    Collection Time: 12/21/23 12:26 AM   Result Value Ref Range    Osmolality 312 (H) 280 - 300 mOsm/kg   Comprehensive Metabolic Panel    Collection Time: 12/21/23 12:26 AM   Result Value Ref Range    Sodium Level 147 (H) 136 - 145 mmol/L    Potassium Level 3.8 3.5 - 5.1 mmol/L    Chloride 117 (H) 98 - 107 mmol/L    Carbon Dioxide 24 23 - 31 mmol/L    Glucose Level 119 (H) 82 - 115 mg/dL    Blood Urea Nitrogen 9.9 8.4 - 25.7 mg/dL    Creatinine 0.95 0.73 - 1.18 mg/dL    Calcium Level Total 8.1 (L) 8.8 - 10.0 mg/dL    Protein Total 6.1 5.8 - 7.6 gm/dL    Albumin Level 3.3 (L) 3.4 - 4.8 g/dL    Globulin 2.8 2.4 - 3.5 gm/dL    Albumin/Globulin Ratio 1.2 1.1 - 2.0 ratio    Bilirubin Total 1.4 <=1.5 mg/dL    Alkaline Phosphatase 58 40 - 150 unit/L    Alanine Aminotransferase 23 0 - 55 unit/L    Aspartate Aminotransferase 33 5 - 34 unit/L    eGFR >60 mls/min/1.73/m2   Magnesium    Collection Time: 12/21/23 12:26 AM   Result Value Ref Range    Magnesium Level 2.20 1.60 - 2.60 mg/dL   Phosphorus    Collection Time: 12/21/23 12:26 AM   Result Value Ref Range    Phosphorus Level 2.0 (L) 2.3 - 4.7 mg/dL   CBC with Differential    Collection Time: 12/21/23 12:26 AM   Result Value Ref Range    WBC 15.78 (H) 4.50 - 11.50 x10(3)/mcL    RBC 4.43 (L) 4.70 - 6.10 x10(6)/mcL    Hgb 13.0 (L) 14.0 - 18.0 g/dL    Hct 39.8 (L) 42.0 - 52.0 %    MCV 89.8 80.0 - 94.0 fL    MCH 29.3 27.0 - 31.0 pg    MCHC 32.7 (L) 33.0 - 36.0 g/dL    RDW 15.1 11.5 - 17.0 %    Platelet 170 130 - 400 x10(3)/mcL    MPV 10.0 7.4 - 10.4 fL    Neut % 85.4 %    Lymph % 6.0 %    Mono % 7.1 %     Eos % 0.7 %    Basophil % 0.4 %    Lymph # 0.95 0.6 - 4.6 x10(3)/mcL    Neut # 13.46 (H) 2.1 - 9.2 x10(3)/mcL    Mono # 1.12 0.1 - 1.3 x10(3)/mcL    Eos # 0.11 0 - 0.9 x10(3)/mcL    Baso # 0.07 <=0.2 x10(3)/mcL    IG# 0.07 (H) 0 - 0.04 x10(3)/mcL    IG% 0.4 %    NRBC% 0.0 %   RT Blood Gas    Collection Time: 12/21/23  6:18 AM   Result Value Ref Range    Sample Type Arterial Blood     Sample site Arterial Line     Drawn by sd rrt     pH, Blood gas 7.430 7.350 - 7.450    pCO2, Blood gas 40.0 35.0 - 45.0 mmHg    pO2, Blood gas 102.0 (H) 80.0 - 100.0 mmHg    Sodium, Blood Gas 146 (H) 137 - 145 mmol/L    Potassium, Blood Gas 3.8 3.5 - 5.0 mmol/L    Calcium Level Ionized 1.09 (L) 1.12 - 1.23 mmol/L    TOC2, Blood gas 27.7 mmol/L    Base Excess, Blood gas 2.00 mmol/L    sO2, Blood gas 98.0 %    HCO3, Blood gas 26.5 (H) 22.0 - 26.0 mmol/L    Allens Test N/A     MODE AC     Oxygen Device, Blood gas Ventilator     FIO2, Blood gas 40 %    Mech Vt 475 ml    Mech RR 20 b/min    PEEP 5.0 cmH2O   Sodium    Collection Time: 12/21/23  7:37 AM   Result Value Ref Range    Sodium Level 147 (H) 136 - 145 mmol/L   Osmolality, Serum    Collection Time: 12/21/23  7:37 AM   Result Value Ref Range    Osmolality 311 (H) 280 - 300 mOsm/kg   POCT glucose    Collection Time: 12/21/23  2:01 PM   Result Value Ref Range    POCT Glucose 163 (H) 70 - 110 mg/dL   Sodium    Collection Time: 12/21/23  5:31 PM   Result Value Ref Range    Sodium Level 147 (H) 136 - 145 mmol/L   Osmolality, Serum    Collection Time: 12/21/23  5:31 PM   Result Value Ref Range    Osmolality 305 (H) 280 - 300 mOsm/kg   Osmolality, Serum    Collection Time: 12/22/23 12:18 AM   Result Value Ref Range    Osmolality 313 (H) 280 - 300 mOsm/kg   Phosphorus    Collection Time: 12/22/23 12:18 AM   Result Value Ref Range    Phosphorus Level 1.9 (L) 2.3 - 4.7 mg/dL   Magnesium    Collection Time: 12/22/23 12:18 AM   Result Value Ref Range    Magnesium Level 2.20 1.60 - 2.60 mg/dL    Comprehensive Metabolic Panel    Collection Time: 12/22/23 12:18 AM   Result Value Ref Range    Sodium Level 150 (H) 136 - 145 mmol/L    Potassium Level 3.6 3.5 - 5.1 mmol/L    Chloride 118 (H) 98 - 107 mmol/L    Carbon Dioxide 25 23 - 31 mmol/L    Glucose Level 147 (H) 82 - 115 mg/dL    Blood Urea Nitrogen 10.2 8.4 - 25.7 mg/dL    Creatinine 0.97 0.73 - 1.18 mg/dL    Calcium Level Total 8.0 (L) 8.8 - 10.0 mg/dL    Protein Total 6.1 5.8 - 7.6 gm/dL    Albumin Level 3.2 (L) 3.4 - 4.8 g/dL    Globulin 2.9 2.4 - 3.5 gm/dL    Albumin/Globulin Ratio 1.1 1.1 - 2.0 ratio    Bilirubin Total 1.4 <=1.5 mg/dL    Alkaline Phosphatase 56 40 - 150 unit/L    Alanine Aminotransferase 28 0 - 55 unit/L    Aspartate Aminotransferase 38 (H) 5 - 34 unit/L    eGFR >60 mls/min/1.73/m2   CBC with Differential    Collection Time: 12/22/23 12:18 AM   Result Value Ref Range    WBC 18.52 (H) 4.50 - 11.50 x10(3)/mcL    RBC 4.30 (L) 4.70 - 6.10 x10(6)/mcL    Hgb 12.8 (L) 14.0 - 18.0 g/dL    Hct 40.0 (L) 42.0 - 52.0 %    MCV 93.0 80.0 - 94.0 fL    MCH 29.8 27.0 - 31.0 pg    MCHC 32.0 (L) 33.0 - 36.0 g/dL    RDW 15.7 11.5 - 17.0 %    Platelet 156 130 - 400 x10(3)/mcL    MPV 10.1 7.4 - 10.4 fL    Neut % 82.6 %    Lymph % 6.5 %    Mono % 8.4 %    Eos % 1.6 %    Basophil % 0.3 %    Lymph # 1.20 0.6 - 4.6 x10(3)/mcL    Neut # 15.28 (H) 2.1 - 9.2 x10(3)/mcL    Mono # 1.56 (H) 0.1 - 1.3 x10(3)/mcL    Eos # 0.30 0 - 0.9 x10(3)/mcL    Baso # 0.06 <=0.2 x10(3)/mcL    IG# 0.12 (H) 0 - 0.04 x10(3)/mcL    IG% 0.6 %    NRBC% 0.0 %   Sodium    Collection Time: 12/22/23  5:38 AM   Result Value Ref Range    Sodium Level 163 (HH) 136 - 145 mmol/L   Osmolality, Serum    Collection Time: 12/22/23  5:38 AM   Result Value Ref Range    Osmolality 329 (HH) 280 - 300 mOsm/kg     Significant Imaging:  Imaging Results              IR Thrombectomy Intracranial Inc all Imaging (Final result)  Result time 12/19/23 15:11:53      Final result by Tani Calderon MD (12/19/23  15:11:53)                   Impression:      Fluoroscopic assistance provided as above.      Electronically signed by: Tani Calderon  Date:    12/19/2023  Time:    15:11               Narrative:    EXAMINATION:  IR THROMBECTOMY INTRACRANIAL INC ALL IMAGING    CLINICAL HISTORY:  Cerebral infarction, unspecifiedstroke;    FINDINGS:  Fluoroscopic assistance provided during vascular procedure.  Images were independently interpreted by the attending physician performing the procedure.    Fluoro time 9.7 minutes.    Reference Air Kerma: 917 mGy.                                       CTA STROKE MULTI-PHASE (Final result)  Result time 12/19/23 12:59:46      Final result by Laureen Christina MD (12/19/23 12:59:46)                   Impression:      Irregular appearance of the right internal carotid artery near the skull base and petrous portion.  This is of uncertain etiology.  This could be related to soft atheromatous plaque, ill-defined dissection flap or artifact.    Poor enhancement of the right anterior circulation including the MCA and HÉCTOR on arterial phase.  There is delayed enhancement of the right HÉCTOR and MCA seen on delayed phase postcontrast imaging suggesting upstream/inflow abnormality.    Very subtle asymmetric loss of gray-white differentiation in the right cerebral hemisphere most pronounced at the frontal lobe and basal ganglia.  No appreciable hemorrhage.    Findings discussed with clinician caring for patient   Randle 12/19/2023 12:39.      Electronically signed by: Laureen Christina  Date:    12/19/2023  Time:    12:59               Narrative:    EXAMINATION:  CTA STROKE MULTI-PHASE    CLINICAL HISTORY:  Neuro deficit, acute, stroke suspected;    TECHNIQUE:  CT angiogram was performed from the level of the jackie to the vertex prior to and following the IV administration of intravenous contrast.  Axial, sagittal and coronal reconstructions and maximum intensity projection reconstructions were  performed. Arterial stenosis percentages are based on NASCET measurement criteria.    Automated tube current modulation, weight-based exposure dosing, and/or iterative reconstruction technique utilized to reach lowest reasonably achievable exposure rate.    DLP: 2045 mGycm    COMPARISON:  CT head 12/19/2023 at 10:57 hours    FINDINGS:  CTA NECK:    AORTIC ARCH AND GREAT VESSELS: 2 vessel left aortic arch.    RIGHT ICA: There is atherosclerotic plaque at the carotid bulb and proximal internal carotid artery with less than 50% stenosis.  There is irregular contour and inhomogeneous enhancement of the cervical carotid artery at the skull base and at the petrous carotid artery (for example series 1, image 289).    LEFT ICA: Mild atherosclerotic plaque at the carotid bulb without hemodynamically significant stenosis.    VERTEBRAL ARTERIES: Diminutive vertebral arteries without stenosis.    CTA HEAD:    ANTERIOR CIRCULATION: On the arterial phase imaging there is asymmetric hypo perfusion and irregular appearance of the cervical carotid artery on the right.  There is poor enhancement at the HÉCTOR and M1 segment.  Contrast fades distally towards the M2 segment.  There is gross asymmetric hypoenhancement of the vasculature at the sylvian fissure when comparing right to left on the arterial phase.  On a slightly delayed phase obtained approximately 30-40 seconds later there is enhancement at the right anterior circulation which is now more symmetric compared to the left suggesting potential delayed in flow phenomenon or perfusion via the [id number].  The left anterior circulation enhances normally without significant stenosis.    POSTERIOR CIRCULATION: No hemodynamically significant stenosis, aneurysmal dilatation, or dissection.    NON-VASCULAR STRUCTURES (LIMITED EVALUATION): There is very subtle loss of gray-white differentiation in the region of the insular cortex and right frontal lobe and slight blurring of  delineation of the basal ganglia on the right.  No appreciable hemorrhage.    Poor dentition.  Dental caries and periapical lucency.                                       CT HEAD FOR STROKE (Final result)  Result time 12/19/23 11:33:25   Procedure changed from CT Head Without Contrast     Final result by Tani Calderon MD (12/19/23 11:33:25)                   Impression:      No acute intracranial findings or significant interval change compared to May 2023.      Electronically signed by: Tani Calderon  Date:    12/19/2023  Time:    11:33               Narrative:    EXAMINATION:  CT HEAD FOR STROKE    CLINICAL HISTORY:  Neuro deficit, acute, stroke suspected;    TECHNIQUE:  CT imaging of the head performed from the skull base to the vertex without intravenous contrast.  mGycm. Automatic exposure control, adjustment of mA/kV or iterative reconstruction technique was used to reduce radiation.    COMPARISON:  23 May 2023    FINDINGS:  There is no acute cortical infarct, hemorrhage or mass lesion.  No new parenchymal attenuation abnormality.  Ventricular size is stable.  There are vascular calcifications.    Visualized paranasal sinuses and mastoid air cells are clear.                                    EKG:        Telemetry:  AF    Physical Exam  Vitals and nursing note reviewed.   Constitutional:       General: He is not in acute distress.     Appearance: He is ill-appearing.   HENT:      Head:      Comments: Cerebral Dressing in Place Post Craniectomy.  Neck:      Comments: RIJ Venous Cath  Cardiovascular:      Rate and Rhythm: Tachycardia present. Rhythm irregular.      Heart sounds: Normal heart sounds.      Comments: AF RVR  Pulmonary:      Effort: Pulmonary effort is normal. No respiratory distress.      Comments: Intubation/Mechanical Ventilation FIO2 40%  Abdominal:      Palpations: Abdomen is soft.      Comments: OG Tube   Genitourinary:     Comments: Fernandez Catheter  Musculoskeletal:      Comments:  Legs are Warm   Skin:     General: Skin is warm and dry.   Neurological:      Comments: Awakes off of sedation       Home Medications:   No current facility-administered medications on file prior to encounter.     Current Outpatient Medications on File Prior to Encounter   Medication Sig Dispense Refill    aspirin 81 MG Chew chew and swallow 1 tablet by mouth daily 90 tablet 3    atorvastatin (LIPITOR) 40 MG tablet Take 1 tablet (40 mg total) by mouth once daily. 90 tablet 1    diltiaZEM (CARDIZEM CD) 360 MG 24 hr capsule Take 1 capsule (360 mg total) by mouth once daily. 90 capsule 1    losartan (COZAAR) 50 MG tablet Take 1 tablet (50 mg total) by mouth once daily. 90 tablet 1    metoprolol succinate (TOPROL-XL) 200 MG 24 hr tablet Take 1 tablet (200 mg total) by mouth 2 (two) times daily. 180 tablet 1    omeprazole (PRILOSEC) 20 MG capsule Take 1 capsule (20 mg total) by mouth once daily. One tab by mouth daily 30 capsule 2    potassium chloride (KLOR-CON) 20 mEq Pack Take 20 mEq by mouth once daily. 30 packet 5    spironolactone (ALDACTONE) 50 MG tablet Take 1 tablet (50 mg total) by mouth once daily. 90 tablet 1    torsemide (DEMADEX) 10 MG Tab Take 1 tablet (10 mg total) by mouth once daily. 30 tablet 0    vitamin D (VITAMIN D3) 1000 units Tab Take 1 tablet (1,000 Units total) by mouth once daily. 30 tablet 1    XARELTO 20 mg Tab TAKE 1 TABLET BY MOUTH DAILY with SUPPER 90 tablet 3    albuterol (PROVENTIL/VENTOLIN HFA) 90 mcg/actuation inhaler Inhale 2 puffs into the lungs every 6 (six) hours as needed for Wheezing. Rescue (Patient not taking: Reported on 8/22/2023) 8.5 g 0    cetirizine (ZYRTEC) 10 MG tablet Take 1 tablet (10 mg total) by mouth once daily. for 14 days 14 tablet 0     Current Inpatient Medications:    Current Facility-Administered Medications:     0.9%  NaCl infusion (for blood administration), , Intravenous, Q24H PRN, Fidel Kraus, LEONARDOP-BC    0.9%  NaCl infusion, , Intravenous,  Continuous, Ijeoma Hernandez NP, Last Rate: 75 mL/hr at 12/22/23 0555, Rate Verify at 12/22/23 0555    aspirin suppository 300 mg, 300 mg, Rectal, Daily, Ijeoma Hernandez, NP, 300 mg at 12/20/23 0905    clevidipine (CLEVIPREX) 25 mg/50 mL infusion, 0-32 mg/hr, Intravenous, Continuous, Patrick Gipson, , Last Rate: 4 mL/hr at 12/22/23 0555, 2 mg/hr at 12/22/23 0555    dexmedetomidine (PRECEDEX) 400mcg/100mL 0.9% NaCL infusion, 0-1.4 mcg/kg/hr, Intravenous, Continuous, Italo Orozco MD, Stopped at 12/20/23 2104    dextrose 10% bolus 125 mL 125 mL, 12.5 g, Intravenous, PRN, Eleazar Westbrook MD    dextrose 10% bolus 250 mL 250 mL, 25 g, Intravenous, PRN, Eleazar Westbrook MD    diltiaZEM 125 mg in dextrose 5 % 125 mL IVPB (ready to mix) (titrating), 0-15 mg/hr, Intravenous, Continuous, Patrick Gipson, DO, Last Rate: 10 mL/hr at 12/20/23 1146, 10 mg/hr at 12/20/23 1146    diltiaZEM injection 10 mg, 10 mg, Intravenous, Once, Eleazar Westbrook MD    fentaNYL injection 50 mcg, 50 mcg, Intravenous, Q1H PRN, Eleazar Westbrook MD, 50 mcg at 12/21/23 1628    glucagon (human recombinant) injection 1 mg, 1 mg, Intramuscular, PRN, Eleazar Westbrook MD    hydrALAZINE injection 10 mg, 10 mg, Intravenous, Q4H PRN **AND** labetaloL injection 10 mg, 10 mg, Intravenous, Q4H PRN, Gasper Barlow MD, 10 mg at 12/22/23 0138    insulin aspart U-100 injection 0-5 Units, 0-5 Units, Subcutaneous, Q6H PRN, Eleazar Westbrook MD, 2 Units at 12/20/23 0624    levETIRAcetam in NaCl (iso-os) IVPB 1,000 mg, 1,000 mg, Intravenous, Q12H, Italo Orozco MD, Stopped at 12/21/23 2053    mannitol 20% infusion 75 g, 75 g, Intravenous, Once, Fidel Kraus, AGACNP-BC    metoprolol injection 5 mg, 5 mg, Intravenous, Q5 Min PRN, Patrick Gipson, DO, 5 mg at 12/21/23 2021    mupirocin 2 % ointment, , Nasal, BID, Italo Orozco MD, Given at 12/21/23 2021     NORepinephrine 8 mg in dextrose 5% 250 mL infusion, 0-3 mcg/kg/min, Intravenous, Continuous, Italo Orozco MD, Stopped at 12/20/23 1929    ondansetron injection 8 mg, 8 mg, Intravenous, Q6H PRN, Patrick Gipson DO, 8 mg at 12/20/23 0045    propofol (DIPRIVAN) 10 mg/mL infusion, 0-50 mcg/kg/min, Intravenous, Continuous, Gasper Barlow MD, Last Rate: 13.2 mL/hr at 12/22/23 0555, 20 mcg/kg/min at 12/22/23 0555    sodium chloride 0.9% flush 10 mL, 10 mL, Intravenous, PRN, Ijeoma Hernandez NP    sodium chloride 3% HYPERTONIC solution, 30 mL/hr, Intravenous, Continuous, Lyn Levine PA, Last Rate: 30 mL/hr at 12/22/23 0555, Rate Verify at 12/22/23 0555    VTE Risk Mitigation (From admission, onward)           Ordered     Reason for No Pharmacological VTE Prophylaxis  Once        Question:  Reasons:  Answer:  Risk of Bleeding    12/19/23 1551     IP VTE HIGH RISK PATIENT  Once         12/19/23 1551     Place sequential compression device  Until discontinued         12/19/23 1551                  Assessment:   Persistent Atrial Fibrillation with Intermittent RVR    - [id number]: 4    - Anticoagulation on Hold in Setting of Cerebral Bleed Post Stroke  Acute Ischemic CVA (Right ICA & M3 Branch) with Hemorrhagic Conversion Requiring Craniectomy    - Status Post Thrombectomy  Acute Hypoxemic Respiratory Failure Requiring Intubation/Mechanical Ventilation  History of VF Arrest due to Prolonged QT Interval/Hypokalemia    - Status Post Dual Chamber ICD     - Normal Coronaries on 3.8.18    - History of MI in 2003 (No Angiographic Evidence of CAD in 2018)  Hypertension- Above Gal Requiring Cleviprex Infusion  Hyperlipidemia  Valvular Heart Disease    - MR: Mild to Moderate  Elevated TSH  Obstructive Sleep Apnea    Plan:   Start Cardizem Infusion & Titrate for Goal HR < 100.  Give Digoxin 0.5 Mg IVP x 1 Dose Now, Repeat 0.25 Mg IVP in 6 Hours.  Lopressor IV PRN HR > 120 Sustained.  Not on Anticoagulation given current clinic  condition/Cerebral Bleed/Hemorrhagic Transformation. Will look for clearance by Neurological Team before considering long-term anticoagulation therapy.  Vent Management as per ICU Team  Continue Supportive Care as per Nursing Team.    Thank you for your consult.     TRUMAN Erickson  Cardiology  [facility] - [facility]  12/22/2023 8:10 AM     I have seen the patient, reviewed the Nurse Practitioner's note, assessment and plan. I have personally interviewed and examined the patient at bedside and agree with the findings. Medical decision making listed above were done under my guidance.

## 2023-12-22 NOTE — PT/OT/SLP PROGRESS
Spoke with RN, pt remains not appropriate for therapy, still intubated/sedated. PT signing off at this time. Please reconsult when appropriate.

## 2023-12-22 NOTE — PROGRESS NOTES
POD#2 right craniectomy for CVA due to thrombosis of right middle cerebral artery   He is intubated and sedated at time of rounds  Does not tolerate sedation wean d/t HTN  Hypertonic saline discontinued with Na of 163 this am  Osm currently 329    AFVSS  Pupils fixed, pinpoint  Exam limited d/t sedation; not currently withdrawing on the left  Withdraws to deep stimuli right UE and LE  Nurse reports some spontaneous movement on the right overnight  +cough, corneal and gag  Turban dressing intact, dry  KRISHNA output 25/20cc, blood tinged CSF    CT head this am:  Impression:       No significant interval change compared to previous exam.  Large right hemispheric infarct with hemorrhagic transformation similar to prior.  Postop decompressive right craniectomy.     Plan: Continue Q6 hour Na checks  Continue current dressing  Continue drain; will dc tomorrow  Wean sedation as tolerated for Q1 hour neuro exams  Continue BP parameters per neurology  Keppra BID  SCDs for DVT prophylaxis; hold anticoagulation for now

## 2023-12-23 LAB
ALBUMIN SERPL-MCNC: 2.8 G/DL (ref 3.4–4.8)
ALBUMIN/GLOB SERPL: 0.8 RATIO (ref 1.1–2)
ALP SERPL-CCNC: 58 UNIT/L (ref 40–150)
ALT SERPL-CCNC: 33 UNIT/L (ref 0–55)
AMORPH URATE CRY URNS QL MICRO: ABNORMAL /UL
APPEARANCE UR: ABNORMAL
AST SERPL-CCNC: 36 UNIT/L (ref 5–34)
BACTERIA #/AREA URNS AUTO: ABNORMAL /HPF
BACTERIA SPEC ANAEROBE CULT: NORMAL
BASE EXCESS BLD CALC-SCNC: 3.7 MMOL/L
BASOPHILS # BLD AUTO: 0.04 X10(3)/MCL
BASOPHILS NFR BLD AUTO: 0.3 %
BILIRUB SERPL-MCNC: 1.6 MG/DL
BILIRUB UR QL STRIP.AUTO: NEGATIVE
BLOOD GAS SAMPLE TYPE (OHS): ABNORMAL
BUN SERPL-MCNC: 12.5 MG/DL (ref 8.4–25.7)
CA-I BLD-SCNC: 1.15 MMOL/L (ref 1.12–1.23)
CALCIUM SERPL-MCNC: 8.3 MG/DL (ref 8.8–10)
CHLORIDE SERPL-SCNC: 119 MMOL/L (ref 98–107)
CO2 BLDA-SCNC: 29.8 MMOL/L
CO2 SERPL-SCNC: 25 MMOL/L (ref 23–31)
COLOR UR AUTO: ABNORMAL
CREAT SERPL-MCNC: 1.04 MG/DL (ref 0.73–1.18)
DRAWN BY BLOOD GAS (OHS): ABNORMAL
EOSINOPHIL # BLD AUTO: 0.29 X10(3)/MCL (ref 0–0.9)
EOSINOPHIL NFR BLD AUTO: 1.9 %
ERYTHROCYTE [DISTWIDTH] IN BLOOD BY AUTOMATED COUNT: 15.8 % (ref 11.5–17)
GFR SERPLBLD CREATININE-BSD FMLA CKD-EPI: >60 MLS/MIN/1.73/M2
GLOBULIN SER-MCNC: 3.5 GM/DL (ref 2.4–3.5)
GLUCOSE SERPL-MCNC: 114 MG/DL (ref 82–115)
GLUCOSE UR QL STRIP.AUTO: NORMAL
HCO3 BLDA-SCNC: 28.5 MMOL/L (ref 22–26)
HCT VFR BLD AUTO: 40.4 % (ref 42–52)
HGB BLD-MCNC: 12.6 G/DL (ref 14–18)
IMM GRANULOCYTES # BLD AUTO: 0.13 X10(3)/MCL (ref 0–0.04)
IMM GRANULOCYTES NFR BLD AUTO: 0.8 %
INHALED O2 CONCENTRATION: 40 %
KETONES UR QL STRIP.AUTO: NEGATIVE
LEUKOCYTE ESTERASE UR QL STRIP.AUTO: 250
LYMPHOCYTES # BLD AUTO: 1.33 X10(3)/MCL (ref 0.6–4.6)
LYMPHOCYTES NFR BLD AUTO: 8.6 %
MAGNESIUM SERPL-MCNC: 2.3 MG/DL (ref 1.6–2.6)
MCH RBC QN AUTO: 29.6 PG (ref 27–31)
MCHC RBC AUTO-ENTMCNC: 31.2 G/DL (ref 33–36)
MCV RBC AUTO: 95.1 FL (ref 80–94)
MECH RR (OHS): 20 B/MIN
MODE (OHS): AC
MONOCYTES # BLD AUTO: 1.4 X10(3)/MCL (ref 0.1–1.3)
MONOCYTES NFR BLD AUTO: 9.1 %
MUCOUS THREADS URNS QL MICRO: ABNORMAL /LPF
NEUTROPHILS # BLD AUTO: 12.25 X10(3)/MCL (ref 2.1–9.2)
NEUTROPHILS NFR BLD AUTO: 79.3 %
NITRITE UR QL STRIP.AUTO: NEGATIVE
NRBC BLD AUTO-RTO: 0 %
OSMOLALITY SERPL: 314 MOSM/KG (ref 280–300)
OSMOLALITY SERPL: 315 MOSM/KG (ref 280–300)
OSMOLALITY SERPL: 317 MOSM/KG (ref 280–300)
OSMOLALITY SERPL: 318 MOSM/KG (ref 280–300)
OXYGEN DEVICE BLOOD GAS (OHS): ABNORMAL
PCO2 BLDA: 43 MMHG (ref 35–45)
PEEP RESPIRATORY: 5 CMH2O
PH BLDA: 7.43 [PH] (ref 7.35–7.45)
PH UR STRIP.AUTO: 6 [PH]
PHOSPHATE SERPL-MCNC: 1.6 MG/DL (ref 2.3–4.7)
PLATELET # BLD AUTO: 138 X10(3)/MCL (ref 130–400)
PMV BLD AUTO: 10.2 FL (ref 7.4–10.4)
PO2 BLDA: 93 MMHG (ref 80–100)
POCT GLUCOSE: 109 MG/DL (ref 70–110)
POTASSIUM BLOOD GAS (OHS): 3.4 MMOL/L (ref 3.5–5)
POTASSIUM SERPL-SCNC: 3.5 MMOL/L (ref 3.5–5.1)
PROT SERPL-MCNC: 6.3 GM/DL (ref 5.8–7.6)
PROT UR QL STRIP.AUTO: ABNORMAL
RBC # BLD AUTO: 4.25 X10(6)/MCL (ref 4.7–6.1)
RBC #/AREA URNS AUTO: ABNORMAL /HPF
RBC UR QL AUTO: ABNORMAL
RENAL EPI CELLS #/AREA UR COMP ASSIST: ABNORMAL /HPF
SAMPLE SITE BLOOD GAS (OHS): ABNORMAL
SAO2 % BLDA: 97 %
SODIUM BLOOD GAS (OHS): 148 MMOL/L (ref 137–145)
SODIUM SERPL-SCNC: 150 MMOL/L (ref 136–145)
SODIUM SERPL-SCNC: 151 MMOL/L (ref 136–145)
SODIUM SERPL-SCNC: 151 MMOL/L (ref 136–145)
SODIUM SERPL-SCNC: 153 MMOL/L (ref 136–145)
SP GR UR STRIP.AUTO: 1.04 (ref 1–1.03)
SPONT+MECH VT ON VENT: 475 ML
SQUAMOUS #/AREA URNS LPF: ABNORMAL /HPF
UROBILINOGEN UR STRIP-ACNC: 2
WBC # SPEC AUTO: 15.44 X10(3)/MCL (ref 4.5–11.5)
WBC #/AREA URNS AUTO: ABNORMAL /HPF

## 2023-12-23 PROCEDURE — 94761 N-INVAS EAR/PLS OXIMETRY MLT: CPT

## 2023-12-23 PROCEDURE — 20000000 HC ICU ROOM

## 2023-12-23 PROCEDURE — 99900026 HC AIRWAY MAINTENANCE (STAT)

## 2023-12-23 PROCEDURE — 25000003 PHARM REV CODE 250: Performed by: STUDENT IN AN ORGANIZED HEALTH CARE EDUCATION/TRAINING PROGRAM

## 2023-12-23 PROCEDURE — 84100 ASSAY OF PHOSPHORUS: CPT

## 2023-12-23 PROCEDURE — 27200966 HC CLOSED SUCTION SYSTEM

## 2023-12-23 PROCEDURE — 81001 URINALYSIS AUTO W/SCOPE: CPT | Performed by: STUDENT IN AN ORGANIZED HEALTH CARE EDUCATION/TRAINING PROGRAM

## 2023-12-23 PROCEDURE — 80053 COMPREHEN METABOLIC PANEL: CPT

## 2023-12-23 PROCEDURE — 87040 BLOOD CULTURE FOR BACTERIA: CPT | Performed by: INTERNAL MEDICINE

## 2023-12-23 PROCEDURE — 25000003 PHARM REV CODE 250

## 2023-12-23 PROCEDURE — 25000003 PHARM REV CODE 250: Performed by: NURSE PRACTITIONER

## 2023-12-23 PROCEDURE — 25000003 PHARM REV CODE 250: Performed by: INTERNAL MEDICINE

## 2023-12-23 PROCEDURE — 99900035 HC TECH TIME PER 15 MIN (STAT)

## 2023-12-23 PROCEDURE — 99900031 HC PATIENT EDUCATION (STAT)

## 2023-12-23 PROCEDURE — 85025 COMPLETE CBC W/AUTO DIFF WBC: CPT

## 2023-12-23 PROCEDURE — 63600175 PHARM REV CODE 636 W HCPCS

## 2023-12-23 PROCEDURE — 27100171 HC OXYGEN HIGH FLOW UP TO 24 HOURS

## 2023-12-23 PROCEDURE — 94003 VENT MGMT INPAT SUBQ DAY: CPT

## 2023-12-23 PROCEDURE — 63600175 PHARM REV CODE 636 W HCPCS: Performed by: INTERNAL MEDICINE

## 2023-12-23 PROCEDURE — 83930 ASSAY OF BLOOD OSMOLALITY: CPT | Performed by: NURSE PRACTITIONER

## 2023-12-23 PROCEDURE — 84295 ASSAY OF SERUM SODIUM: CPT | Performed by: NURSE PRACTITIONER

## 2023-12-23 PROCEDURE — 87086 URINE CULTURE/COLONY COUNT: CPT | Performed by: STUDENT IN AN ORGANIZED HEALTH CARE EDUCATION/TRAINING PROGRAM

## 2023-12-23 PROCEDURE — 83735 ASSAY OF MAGNESIUM: CPT

## 2023-12-23 RX ORDER — ACETAMINOPHEN 650 MG/20.3ML
650 LIQUID ORAL EVERY 4 HOURS PRN
Status: DISCONTINUED | OUTPATIENT
Start: 2023-12-23 | End: 2023-12-28

## 2023-12-23 RX ORDER — NAPROXEN SODIUM 220 MG/1
81 TABLET, FILM COATED ORAL DAILY
Status: DISCONTINUED | OUTPATIENT
Start: 2023-12-23 | End: 2023-12-28

## 2023-12-23 RX ADMIN — ACETAMINOPHEN 650 MG: 650 SOLUTION ORAL at 05:12

## 2023-12-23 RX ADMIN — DEXMEDETOMIDINE HYDROCHLORIDE 0.2 MCG/KG/HR: 4 INJECTION INTRAVENOUS at 03:12

## 2023-12-23 RX ADMIN — SODIUM CHLORIDE: 9 INJECTION, SOLUTION INTRAVENOUS at 04:12

## 2023-12-23 RX ADMIN — LEVETIRACETAM INJECTION 1000 MG: 10 INJECTION INTRAVENOUS at 09:12

## 2023-12-23 RX ADMIN — ASPIRIN 81 MG CHEWABLE TABLET 81 MG: 81 TABLET CHEWABLE at 09:12

## 2023-12-23 RX ADMIN — ACETAMINOPHEN 650 MG: 650 SOLUTION ORAL at 04:12

## 2023-12-23 RX ADMIN — POTASSIUM PHOSPHATE, MONOBASIC POTASSIUM PHOSPHATE, DIBASIC 20 MMOL: 224; 236 INJECTION, SOLUTION, CONCENTRATE INTRAVENOUS at 05:12

## 2023-12-23 RX ADMIN — DILTIAZEM HYDROCHLORIDE 10 MG/HR: 5 INJECTION INTRAVENOUS at 05:12

## 2023-12-23 RX ADMIN — PROPOFOL 50 MCG/KG/MIN: 10 INJECTION, EMULSION INTRAVENOUS at 04:12

## 2023-12-23 RX ADMIN — MUPIROCIN: 20 OINTMENT TOPICAL at 09:12

## 2023-12-23 RX ADMIN — PROPOFOL 50 MCG/KG/MIN: 10 INJECTION, EMULSION INTRAVENOUS at 08:12

## 2023-12-23 RX ADMIN — MUPIROCIN: 20 OINTMENT TOPICAL at 08:12

## 2023-12-23 RX ADMIN — DEXMEDETOMIDINE HYDROCHLORIDE 0.2 MCG/KG/HR: 4 INJECTION INTRAVENOUS at 08:12

## 2023-12-23 NOTE — PLAN OF CARE
Problem: Adult Inpatient Plan of Care  Goal: Plan of Care Review  Outcome: Ongoing, Progressing  Goal: Patient-Specific Goal (Individualized)  Outcome: Ongoing, Progressing  Goal: Absence of Hospital-Acquired Illness or Injury  Outcome: Ongoing, Progressing  Goal: Optimal Comfort and Wellbeing  Outcome: Ongoing, Progressing  Goal: Readiness for Transition of Care  Outcome: Ongoing, Progressing     Problem: Skin Injury Risk Increased  Goal: Skin Health and Integrity  Outcome: Ongoing, Progressing     Problem: Adjustment to Illness (Stroke, Ischemic/Transient Ischemic Attack)  Goal: Optimal Coping  Outcome: Ongoing, Progressing     Problem: Bowel Elimination Impaired (Stroke, Ischemic/Transient Ischemic Attack)  Goal: Effective Bowel Elimination  Outcome: Ongoing, Progressing     Problem: Cerebral Tissue Perfusion (Stroke, Ischemic/Transient Ischemic Attack)  Goal: Optimal Cerebral Tissue Perfusion  Outcome: Ongoing, Progressing     Problem: Cognitive Impairment (Stroke, Ischemic/Transient Ischemic Attack)  Goal: Optimal Cognitive Function  Outcome: Ongoing, Progressing     Problem: Functional Ability Impaired (Stroke, Ischemic/Transient Ischemic Attack)  Goal: Optimal Functional Ability  Outcome: Ongoing, Progressing     Problem: Respiratory Compromise (Stroke, Ischemic/Transient Ischemic Attack)  Goal: Effective Oxygenation and Ventilation  Outcome: Ongoing, Progressing     Problem: Sensorimotor Impairment (Stroke, Ischemic/Transient Ischemic Attack)  Goal: Improved Sensorimotor Function  Outcome: Ongoing, Progressing     Problem: Swallowing Impairment (Stroke, Ischemic/Transient Ischemic Attack)  Goal: Optimal Eating and Swallowing without Aspiration  Outcome: Ongoing, Progressing     Problem: Urinary Elimination Impaired (Stroke, Ischemic/Transient Ischemic Attack)  Goal: Effective Urinary Elimination  Outcome: Ongoing, Progressing

## 2023-12-23 NOTE — PROGRESS NOTES
Consults  [facility] - [facility]    Cardiology  Progress Note    Patient Name: Delio Daniel Jr.  MRN: [id number]  Admission Date: 12/19/2023  Hospital Length of Stay: 4 days  Code Status: Full Code   Attending Provider: Italo Orozco MD   Consulting Provider: TRUMAN Sumner  Primary Care Physician: Candy, Primary Doctor  Principal Problem:Stroke    Patient information was obtained from past medical records, ER records, and primary team.     Subjective:   Consultation Reason: AF RVR    HPI:   Mr. Daniel is a 68 year old male, known to [facility] at [facility] (underwent cath by Dr. Marrero in 2018), who presented to the hospital on 12.19.23 with left facial droop, slurred speech, left sided hemiparesis, and fixed right sided gaze. Stroke Protocol was initiated. CT Head revealed possible dense right MCA Territory stroke. He was taken to cath lab where he underwent BRAD Thrombectomy. He experienced hemorrhagic conversion requiring craniectomy. Also required intubation/mechanical ventilation. He does have atrial fibrillation and was started on Cardizem infusion for acute HR Control. [facility] consulted for AF Management.    12.23.23: Intubated and Mechanically ventilated. Currently on Propofol with transition to Precedex.     PMH: Persistent AF (Xarelto), Hypertension, CAD (STEMI 2003), Heart Failure with Preserved EF (50-55%), Second Degree AV Block S/P ICD, VF due to Prolonged QT Interval, Hypokalemia, BPH, Fatty Liver Disease, Hyperlipidemia, MI, Obesity, Steatosis of Liver, Cardiac Arrest, Arthritis  PSH: LHC, Colonoscopy, Device Placement (Dual Chamber ICD)  Family History: Mother- Hypertension, Father- Hypertension, Sister- Hypertension  Social History: Tobacco- Former Smoker, Alcohol- Negative, Substance Abuse- Negative     Previous Cardiac Diagnostics:   Carotid US (12.19.23):  The right internal carotid artery demonstrated less than 50% stenosis.  The left internal carotid artery demonstrated less than 50%  stenosis.  The bilateral vertebral arteries were patent with antegrade flow.  Bilateral internal carotid arteries demonstrated decreased velocities starting from the common carotid arteries.     Echocardiogram (12.19.23):  Left Ventricle: There is moderate concentric hypertrophy. Normal wall motion. There is low normal systolic function with a visually estimated ejection fraction of 50 - 55%. Unable to assess diastolic function due to atrial fibrillation. Elevated left ventricular filling pressure.  Right Ventricle: Normal right ventricular cavity size. Systolic function is mildly reduced.  Left Atrium: Left atrium is mildly dilated. Agitated saline study of the atrial septum is negative, suggesting absence of intracardiac shunt at the atrial level.  Right Atrium: Right atrium is dilated.  Mitral Valve: There is mild to moderate regurgitation with an anteromedial eccentrically directed jet.  Negative bubble study.    CV US Venous Doppler (9.28.23):  There is no evidence of a right lower extremity DVT.  There is no evidence of a left lower extremity DVT.  The contralateral (left) common femoral vein is patent.  There is a right subcutaneous edema located in the proximal thigh, middle thigh, distal thigh, proximal calf and distal calf veins.  The contralateral (right) common femoral vein is patent.  There is a left subcutaneous edema located in the proximal thigh, middle thigh, distal thigh, proximal calf and distal calf veins.  There was no deep vein thrombosis identified in the visualized veins of the bilateral lower extremities.      Echocardiogram (8.15.23):  Technically difficult study.  Optison contrast used.  Rhythm is atrial fibrillation.  Left Ventricle: There is normal systolic function with a visually estimated ejection fraction of 55 - 60%. Unable to assess due to atrial fibrillation.  Left Atrium: Left atrium is dilated.  Right Ventricle: Normal right ventricular cavity size.  Aortic Valve: The aortic  valve is a trileaflet valve.  Mitral Valve: There is mild regurgitation with an anteromedial eccentrically directed jet.    Left Heart Catheterization (3.8.18):  Normal Coronary Angiogram.  Normal LVEDP with No Aortic Stenosis.  EF 60% with No Segmental Wall Motion Abnormalities.  No MR.     Review of patient's allergies indicates:  No Known Allergies    No current facility-administered medications on file prior to encounter.     Current Outpatient Medications on File Prior to Encounter   Medication Sig    aspirin 81 MG Chew chew and swallow 1 tablet by mouth daily    atorvastatin (LIPITOR) 40 MG tablet Take 1 tablet (40 mg total) by mouth once daily.    diltiaZEM (CARDIZEM CD) 360 MG 24 hr capsule Take 1 capsule (360 mg total) by mouth once daily.    losartan (COZAAR) 50 MG tablet Take 1 tablet (50 mg total) by mouth once daily.    metoprolol succinate (TOPROL-XL) 200 MG 24 hr tablet Take 1 tablet (200 mg total) by mouth 2 (two) times daily.    omeprazole (PRILOSEC) 20 MG capsule Take 1 capsule (20 mg total) by mouth once daily. One tab by mouth daily    potassium chloride (KLOR-CON) 20 mEq Pack Take 20 mEq by mouth once daily.    spironolactone (ALDACTONE) 50 MG tablet Take 1 tablet (50 mg total) by mouth once daily.    torsemide (DEMADEX) 10 MG Tab Take 1 tablet (10 mg total) by mouth once daily.    vitamin D (VITAMIN D3) 1000 units Tab Take 1 tablet (1,000 Units total) by mouth once daily.    XARELTO 20 mg Tab TAKE 1 TABLET BY MOUTH DAILY with SUPPER    albuterol (PROVENTIL/VENTOLIN HFA) 90 mcg/actuation inhaler Inhale 2 puffs into the lungs every 6 (six) hours as needed for Wheezing. Rescue (Patient not taking: Reported on 8/22/2023)    cetirizine (ZYRTEC) 10 MG tablet Take 1 tablet (10 mg total) by mouth once daily. for 14 days     Review of Systems   Unable to perform ROS: Intubated   Respiratory:          Intubated and mechanically ventilated      Objective:     Vital Signs (Most Recent):  Temp: 99.8 °F  (37.7 °C) (12/23/23 0615)  Pulse: 88 (12/23/23 0700)  Resp: 20 (12/23/23 1110)  BP: 116/76 (12/23/23 0700)  SpO2: 100 % (12/23/23 0700) Vital Signs (24h Range):  Temp:  [99.7 °F (37.6 °C)-101.3 °F (38.5 °C)] 99.8 °F (37.7 °C)  Pulse:  [] 88  Resp:  [4-28] 20  SpO2:  [97 %-100 %] 100 %  BP: ()/() 116/76     Weight: 110 kg (242 lb 8.1 oz)  Body mass index is 33.84 kg/m².    SpO2: 100 %         Intake/Output Summary (Last 24 hours) at 12/23/2023 1204  Last data filed at 12/23/2023 0800  Gross per 24 hour   Intake 1329.83 ml   Output 1040 ml   Net 289.83 ml         Lines/Drains/Airways       Central Venous Catheter Line  Duration             Percutaneous Central Line Insertion/Assessment - Triple Lumen  12/20/23 1056 Internal Jugular Right 3 days              Drain  Duration                  Closed/Suction Drain Right Scalp -- days         NG/OG Tube 12/20/23 0930 16 Fr. Center mouth 3 days         Urethral Catheter 12/20/23 1007 3 days              Airway  Duration                  Airway - Non-Surgical 12/20/23 0930 Endotracheal Tube 3 days              Peripheral Intravenous Line  Duration                  Peripheral IV - Single Lumen 12/19/23 1045 20 G Lateral;Left Breast 4 days         Peripheral IV - Single Lumen 12/19/23 1053 20 G Right Antecubital 4 days                  Significant Labs:  Recent Results (from the past 72 hour(s))   Sodium    Collection Time: 12/20/23 12:09 PM   Result Value Ref Range    Sodium Level 133 (L) 136 - 145 mmol/L   Osmolality, Serum    Collection Time: 12/20/23 12:09 PM   Result Value Ref Range    Osmolality 312 (H) 280 - 300 mOsm/kg   POCT glucose    Collection Time: 12/20/23 12:49 PM   Result Value Ref Range    POCT Glucose 192 (H) 70 - 110 mg/dL   Prepare RBC 2 Units; prepare for surgery    Collection Time: 12/20/23  3:51 PM   Result Value Ref Range    UNIT NUMBER [id number]     UNIT ABO/RH A POS     DISPENSE STATUS Selected     Unit Expiration [id number]      Product Code L6546A47     Unit Blood Type Code 6200     CROSSMATCH INTERPRETATION Compatible     UNIT NUMBER [id number]     UNIT ABO/RH A POS     DISPENSE STATUS Selected     Unit Expiration [id number]     Product Code F8849X65     Unit Blood Type Code 6200     CROSSMATCH INTERPRETATION Compatible    Type & Screen    Collection Time: 12/20/23  3:51 PM   Result Value Ref Range    Group & Rh A POS     Indirect Kaila GEL NEG     Specimen Outdate 12/23/2023 23:59    Anaerobic Culture    Collection Time: 12/20/23  5:52 PM    Specimen: Skull; Bone   Result Value Ref Range    Anaerobe Culture No Anaerobes Isolated    Tissue Culture - Aerobic    Collection Time: 12/20/23  5:52 PM    Specimen: Skull; Bone   Result Value Ref Range    CULTURE, TISSUE- AEROBIC ([facility]) No Growth At 72 Hours    Osmolality, Serum    Collection Time: 12/20/23  7:03 PM   Result Value Ref Range    Osmolality 322 (HH) 280 - 300 mOsm/kg   Basic metabolic panel    Collection Time: 12/20/23  7:03 PM   Result Value Ref Range    Sodium Level 144 136 - 145 mmol/L    Potassium Level 3.8 3.5 - 5.1 mmol/L    Chloride 113 (H) 98 - 107 mmol/L    Carbon Dioxide 23 23 - 31 mmol/L    Glucose Level 135 (H) 82 - 115 mg/dL    Blood Urea Nitrogen 10.0 8.4 - 25.7 mg/dL    Creatinine 1.02 0.73 - 1.18 mg/dL    BUN/Creatinine Ratio 10     Calcium Level Total 8.0 (L) 8.8 - 10.0 mg/dL    Anion Gap 8.0 mEq/L    eGFR >60 mls/min/1.73/m2   CBC with Differential    Collection Time: 12/20/23  7:03 PM   Result Value Ref Range    WBC 21.71 (H) 4.50 - 11.50 x10(3)/mcL    RBC 4.50 (L) 4.70 - 6.10 x10(6)/mcL    Hgb 13.5 (L) 14.0 - 18.0 g/dL    Hct 39.8 (L) 42.0 - 52.0 %    MCV 88.4 80.0 - 94.0 fL    MCH 30.0 27.0 - 31.0 pg    MCHC 33.9 33.0 - 36.0 g/dL    RDW 14.9 11.5 - 17.0 %    Platelet 206 130 - 400 x10(3)/mcL    MPV 10.0 7.4 - 10.4 fL    Neut % 87.1 %    Lymph % 5.3 %    Mono % 6.6 %    Eos % 0.0 %    Basophil % 0.3 %    Lymph # 1.16 0.6 - 4.6 x10(3)/mcL    Neut # 18.89  (H) 2.1 - 9.2 x10(3)/mcL    Mono # 1.43 (H) 0.1 - 1.3 x10(3)/mcL    Eos # 0.01 0 - 0.9 x10(3)/mcL    Baso # 0.06 <=0.2 x10(3)/mcL    IG# 0.16 (H) 0 - 0.04 x10(3)/mcL    IG% 0.7 %    NRBC% 0.0 %   RT Blood Gas    Collection Time: 12/20/23  8:38 PM   Result Value Ref Range    Sample Type Arterial Blood     Sample site Arterial Line     Drawn by pearl rt     pH, Blood gas 7.420 7.350 - 7.450    pCO2, Blood gas 41.0 35.0 - 45.0 mmHg    pO2, Blood gas 103.0 (H) 80.0 - 100.0 mmHg    Sodium, Blood Gas 144 137 - 145 mmol/L    Potassium, Blood Gas 3.7 3.5 - 5.0 mmol/L    Calcium Level Ionized 1.09 (L) 1.12 - 1.23 mmol/L    TOC2, Blood gas 27.9 mmol/L    Base Excess, Blood gas 1.90 mmol/L    sO2, Blood gas 98.0 %    HCO3, Blood gas 26.6 (H) 22.0 - 26.0 mmol/L    Allens Test N/A     MODE AC     Oxygen Device, Blood gas Ventilator     FIO2, Blood gas 50 %    Mech Vt 475 ml    Mech RR 20 b/min    PEEP 5.0 cmH2O    Flow 50 LPM   Osmolality, Serum    Collection Time: 12/21/23 12:26 AM   Result Value Ref Range    Osmolality 312 (H) 280 - 300 mOsm/kg   Comprehensive Metabolic Panel    Collection Time: 12/21/23 12:26 AM   Result Value Ref Range    Sodium Level 147 (H) 136 - 145 mmol/L    Potassium Level 3.8 3.5 - 5.1 mmol/L    Chloride 117 (H) 98 - 107 mmol/L    Carbon Dioxide 24 23 - 31 mmol/L    Glucose Level 119 (H) 82 - 115 mg/dL    Blood Urea Nitrogen 9.9 8.4 - 25.7 mg/dL    Creatinine 0.95 0.73 - 1.18 mg/dL    Calcium Level Total 8.1 (L) 8.8 - 10.0 mg/dL    Protein Total 6.1 5.8 - 7.6 gm/dL    Albumin Level 3.3 (L) 3.4 - 4.8 g/dL    Globulin 2.8 2.4 - 3.5 gm/dL    Albumin/Globulin Ratio 1.2 1.1 - 2.0 ratio    Bilirubin Total 1.4 <=1.5 mg/dL    Alkaline Phosphatase 58 40 - 150 unit/L    Alanine Aminotransferase 23 0 - 55 unit/L    Aspartate Aminotransferase 33 5 - 34 unit/L    eGFR >60 mls/min/1.73/m2   Magnesium    Collection Time: 12/21/23 12:26 AM   Result Value Ref Range    Magnesium Level 2.20 1.60 - 2.60 mg/dL   Phosphorus     Collection Time: 12/21/23 12:26 AM   Result Value Ref Range    Phosphorus Level 2.0 (L) 2.3 - 4.7 mg/dL   CBC with Differential    Collection Time: 12/21/23 12:26 AM   Result Value Ref Range    WBC 15.78 (H) 4.50 - 11.50 x10(3)/mcL    RBC 4.43 (L) 4.70 - 6.10 x10(6)/mcL    Hgb 13.0 (L) 14.0 - 18.0 g/dL    Hct 39.8 (L) 42.0 - 52.0 %    MCV 89.8 80.0 - 94.0 fL    MCH 29.3 27.0 - 31.0 pg    MCHC 32.7 (L) 33.0 - 36.0 g/dL    RDW 15.1 11.5 - 17.0 %    Platelet 170 130 - 400 x10(3)/mcL    MPV 10.0 7.4 - 10.4 fL    Neut % 85.4 %    Lymph % 6.0 %    Mono % 7.1 %    Eos % 0.7 %    Basophil % 0.4 %    Lymph # 0.95 0.6 - 4.6 x10(3)/mcL    Neut # 13.46 (H) 2.1 - 9.2 x10(3)/mcL    Mono # 1.12 0.1 - 1.3 x10(3)/mcL    Eos # 0.11 0 - 0.9 x10(3)/mcL    Baso # 0.07 <=0.2 x10(3)/mcL    IG# 0.07 (H) 0 - 0.04 x10(3)/mcL    IG% 0.4 %    NRBC% 0.0 %   RT Blood Gas    Collection Time: 12/21/23  6:18 AM   Result Value Ref Range    Sample Type Arterial Blood     Sample site Arterial Line     Drawn by sd rrt     pH, Blood gas 7.430 7.350 - 7.450    pCO2, Blood gas 40.0 35.0 - 45.0 mmHg    pO2, Blood gas 102.0 (H) 80.0 - 100.0 mmHg    Sodium, Blood Gas 146 (H) 137 - 145 mmol/L    Potassium, Blood Gas 3.8 3.5 - 5.0 mmol/L    Calcium Level Ionized 1.09 (L) 1.12 - 1.23 mmol/L    TOC2, Blood gas 27.7 mmol/L    Base Excess, Blood gas 2.00 mmol/L    sO2, Blood gas 98.0 %    HCO3, Blood gas 26.5 (H) 22.0 - 26.0 mmol/L    Allens Test N/A     MODE AC     Oxygen Device, Blood gas Ventilator     FIO2, Blood gas 40 %    Mech Vt 475 ml    Mech RR 20 b/min    PEEP 5.0 cmH2O   Sodium    Collection Time: 12/21/23  7:37 AM   Result Value Ref Range    Sodium Level 147 (H) 136 - 145 mmol/L   Osmolality, Serum    Collection Time: 12/21/23  7:37 AM   Result Value Ref Range    Osmolality 311 (H) 280 - 300 mOsm/kg   POCT glucose    Collection Time: 12/21/23  2:01 PM   Result Value Ref Range    POCT Glucose 163 (H) 70 - 110 mg/dL   Sodium    Collection Time: 12/21/23   5:31 PM   Result Value Ref Range    Sodium Level 147 (H) 136 - 145 mmol/L   Osmolality, Serum    Collection Time: 12/21/23  5:31 PM   Result Value Ref Range    Osmolality 305 (H) 280 - 300 mOsm/kg   Osmolality, Serum    Collection Time: 12/22/23 12:18 AM   Result Value Ref Range    Osmolality 313 (H) 280 - 300 mOsm/kg   Phosphorus    Collection Time: 12/22/23 12:18 AM   Result Value Ref Range    Phosphorus Level 1.9 (L) 2.3 - 4.7 mg/dL   Magnesium    Collection Time: 12/22/23 12:18 AM   Result Value Ref Range    Magnesium Level 2.20 1.60 - 2.60 mg/dL   Comprehensive Metabolic Panel    Collection Time: 12/22/23 12:18 AM   Result Value Ref Range    Sodium Level 150 (H) 136 - 145 mmol/L    Potassium Level 3.6 3.5 - 5.1 mmol/L    Chloride 118 (H) 98 - 107 mmol/L    Carbon Dioxide 25 23 - 31 mmol/L    Glucose Level 147 (H) 82 - 115 mg/dL    Blood Urea Nitrogen 10.2 8.4 - 25.7 mg/dL    Creatinine 0.97 0.73 - 1.18 mg/dL    Calcium Level Total 8.0 (L) 8.8 - 10.0 mg/dL    Protein Total 6.1 5.8 - 7.6 gm/dL    Albumin Level 3.2 (L) 3.4 - 4.8 g/dL    Globulin 2.9 2.4 - 3.5 gm/dL    Albumin/Globulin Ratio 1.1 1.1 - 2.0 ratio    Bilirubin Total 1.4 <=1.5 mg/dL    Alkaline Phosphatase 56 40 - 150 unit/L    Alanine Aminotransferase 28 0 - 55 unit/L    Aspartate Aminotransferase 38 (H) 5 - 34 unit/L    eGFR >60 mls/min/1.73/m2   CBC with Differential    Collection Time: 12/22/23 12:18 AM   Result Value Ref Range    WBC 18.52 (H) 4.50 - 11.50 x10(3)/mcL    RBC 4.30 (L) 4.70 - 6.10 x10(6)/mcL    Hgb 12.8 (L) 14.0 - 18.0 g/dL    Hct 40.0 (L) 42.0 - 52.0 %    MCV 93.0 80.0 - 94.0 fL    MCH 29.8 27.0 - 31.0 pg    MCHC 32.0 (L) 33.0 - 36.0 g/dL    RDW 15.7 11.5 - 17.0 %    Platelet 156 130 - 400 x10(3)/mcL    MPV 10.1 7.4 - 10.4 fL    Neut % 82.6 %    Lymph % 6.5 %    Mono % 8.4 %    Eos % 1.6 %    Basophil % 0.3 %    Lymph # 1.20 0.6 - 4.6 x10(3)/mcL    Neut # 15.28 (H) 2.1 - 9.2 x10(3)/mcL    Mono # 1.56 (H) 0.1 - 1.3 x10(3)/mcL    Eos  # 0.30 0 - 0.9 x10(3)/mcL    Baso # 0.06 <=0.2 x10(3)/mcL    IG# 0.12 (H) 0 - 0.04 x10(3)/mcL    IG% 0.6 %    NRBC% 0.0 %   Sodium    Collection Time: 12/22/23  5:38 AM   Result Value Ref Range    Sodium Level 163 (HH) 136 - 145 mmol/L   Osmolality, Serum    Collection Time: 12/22/23  5:38 AM   Result Value Ref Range    Osmolality 329 (HH) 280 - 300 mOsm/kg   Sodium    Collection Time: 12/22/23 12:47 PM   Result Value Ref Range    Sodium Level 150 (H) 136 - 145 mmol/L   Osmolality, Serum    Collection Time: 12/22/23 12:47 PM   Result Value Ref Range    Osmolality 314 (H) 280 - 300 mOsm/kg   Sodium    Collection Time: 12/22/23  6:11 PM   Result Value Ref Range    Sodium Level 152 (H) 136 - 145 mmol/L   Osmolality, Serum    Collection Time: 12/22/23  6:11 PM   Result Value Ref Range    Osmolality 314 (H) 280 - 300 mOsm/kg   POCT glucose    Collection Time: 12/22/23  6:36 PM   Result Value Ref Range    POCT Glucose 100 70 - 110 mg/dL   Osmolality, Serum    Collection Time: 12/22/23 11:57 PM   Result Value Ref Range    Osmolality 315 (H) 280 - 300 mOsm/kg   Phosphorus    Collection Time: 12/23/23 12:36 AM   Result Value Ref Range    Phosphorus Level 1.6 (L) 2.3 - 4.7 mg/dL   Magnesium    Collection Time: 12/23/23 12:36 AM   Result Value Ref Range    Magnesium Level 2.30 1.60 - 2.60 mg/dL   Comprehensive Metabolic Panel    Collection Time: 12/23/23 12:36 AM   Result Value Ref Range    Sodium Level 153 (H) 136 - 145 mmol/L    Potassium Level 3.5 3.5 - 5.1 mmol/L    Chloride 119 (H) 98 - 107 mmol/L    Carbon Dioxide 25 23 - 31 mmol/L    Glucose Level 114 82 - 115 mg/dL    Blood Urea Nitrogen 12.5 8.4 - 25.7 mg/dL    Creatinine 1.04 0.73 - 1.18 mg/dL    Calcium Level Total 8.3 (L) 8.8 - 10.0 mg/dL    Protein Total 6.3 5.8 - 7.6 gm/dL    Albumin Level 2.8 (L) 3.4 - 4.8 g/dL    Globulin 3.5 2.4 - 3.5 gm/dL    Albumin/Globulin Ratio 0.8 (L) 1.1 - 2.0 ratio    Bilirubin Total 1.6 (H) <=1.5 mg/dL    Alkaline Phosphatase 58 40 -  150 unit/L    Alanine Aminotransferase 33 0 - 55 unit/L    Aspartate Aminotransferase 36 (H) 5 - 34 unit/L    eGFR >60 mls/min/1.73/m2   CBC with Differential    Collection Time: 12/23/23 12:36 AM   Result Value Ref Range    WBC 15.44 (H) 4.50 - 11.50 x10(3)/mcL    RBC 4.25 (L) 4.70 - 6.10 x10(6)/mcL    Hgb 12.6 (L) 14.0 - 18.0 g/dL    Hct 40.4 (L) 42.0 - 52.0 %    MCV 95.1 (H) 80.0 - 94.0 fL    MCH 29.6 27.0 - 31.0 pg    MCHC 31.2 (L) 33.0 - 36.0 g/dL    RDW 15.8 11.5 - 17.0 %    Platelet 138 130 - 400 x10(3)/mcL    MPV 10.2 7.4 - 10.4 fL    Neut % 79.3 %    Lymph % 8.6 %    Mono % 9.1 %    Eos % 1.9 %    Basophil % 0.3 %    Lymph # 1.33 0.6 - 4.6 x10(3)/mcL    Neut # 12.25 (H) 2.1 - 9.2 x10(3)/mcL    Mono # 1.40 (H) 0.1 - 1.3 x10(3)/mcL    Eos # 0.29 0 - 0.9 x10(3)/mcL    Baso # 0.04 <=0.2 x10(3)/mcL    IG# 0.13 (H) 0 - 0.04 x10(3)/mcL    IG% 0.8 %    NRBC% 0.0 %   Urinalysis, Reflex to Urine Culture    Collection Time: 12/23/23  5:23 AM    Specimen: Urine   Result Value Ref Range    Color, UA Light-Orange (A) Yellow, Light-Yellow, Colorless, Straw, Dark-Yellow    Appearance, UA Turbid (A) Clear    Specific Gravity, UA 1.040 (H) 1.005 - 1.030    pH, UA 6.0 5.0 - 8.5    Protein, UA 2+ (A) Negative    Glucose, UA Normal Negative, Normal    Ketones, UA Negative Negative    Blood, UA 3+ (A) Negative    Bilirubin, UA Negative Negative    Urobilinogen, UA 2.0 (A) 0.2, 1.0, Normal    Nitrites, UA Negative Negative    Leukocyte Esterase,  (A) Negative    WBC, UA 11-20 (A) None Seen, 0-2, 3-5, 0-5 /HPF    Bacteria, UA Trace None Seen, Trace /HPF    Squamous Epithelial Cells, UA Trace None Seen /HPF    Renal Epithelial Cells, UA Few (A) None Seen /HPF    Mucous, UA Trace (A) None Seen /LPF    Amorphous Crystal, UA Occasional /uL    RBC, UA 21-50 (A) None Seen, 0-2, 3-5, 0-5 /HPF   RT Blood Gas    Collection Time: 12/23/23  6:13 AM   Result Value Ref Range    Sample Type Arterial Blood     Sample site Right Radial  Artery     Drawn by swcrt     pH, Blood gas 7.430 7.350 - 7.450    pCO2, Blood gas 43.0 35.0 - 45.0 mmHg    pO2, Blood gas 93.0 80.0 - 100.0 mmHg    Sodium, Blood Gas 148 (H) 137 - 145 mmol/L    Potassium, Blood Gas 3.4 (L) 3.5 - 5.0 mmol/L    Calcium Level Ionized 1.15 1.12 - 1.23 mmol/L    TOC2, Blood gas 29.8 mmol/L    Base Excess, Blood gas 3.70 mmol/L    sO2, Blood gas 97.0 %    HCO3, Blood gas 28.5 (H) 22.0 - 26.0 mmol/L    MODE AC     Oxygen Device, Blood gas Ventilator     FIO2, Blood gas 40 %    Mech Vt 475 ml    Mech RR 20 b/min    PEEP 5.0 cmH2O   Osmolality, Serum    Collection Time: 12/23/23  6:17 AM   Result Value Ref Range    Osmolality 318 (H) 280 - 300 mOsm/kg   Sodium    Collection Time: 12/23/23  6:18 AM   Result Value Ref Range    Sodium Level 151 (H) 136 - 145 mmol/L     Significant Imaging:  Imaging Results              IR Thrombectomy Intracranial Inc all Imaging (Final result)  Result time 12/19/23 15:11:53      Final result by Tani Calderon MD (12/19/23 15:11:53)                   Impression:      Fluoroscopic assistance provided as above.      Electronically signed by: Tani Calderon  Date:    12/19/2023  Time:    15:11               Narrative:    EXAMINATION:  IR THROMBECTOMY INTRACRANIAL INC ALL IMAGING    CLINICAL HISTORY:  Cerebral infarction, unspecifiedstroke;    FINDINGS:  Fluoroscopic assistance provided during vascular procedure.  Images were independently interpreted by the attending physician performing the procedure.    Fluoro time 9.7 minutes.    Reference Air Kerma: 917 mGy.                                       CTA STROKE MULTI-PHASE (Final result)  Result time 12/19/23 12:59:46      Final result by Laureen Christina MD (12/19/23 12:59:46)                   Impression:      Irregular appearance of the right internal carotid artery near the skull base and petrous portion.  This is of uncertain etiology.  This could be related to soft atheromatous plaque, ill-defined  dissection flap or artifact.    Poor enhancement of the right anterior circulation including the MCA and HÉCTOR on arterial phase.  There is delayed enhancement of the right HÉCTOR and MCA seen on delayed phase postcontrast imaging suggesting upstream/inflow abnormality.    Very subtle asymmetric loss of gray-white differentiation in the right cerebral hemisphere most pronounced at the frontal lobe and basal ganglia.  No appreciable hemorrhage.    Findings discussed with clinician caring for patient Dr. Randle 12/19/2023 12:39.      Electronically signed by: Laureen Christina  Date:    12/19/2023  Time:    12:59               Narrative:    EXAMINATION:  CTA STROKE MULTI-PHASE    CLINICAL HISTORY:  Neuro deficit, acute, stroke suspected;    TECHNIQUE:  CT angiogram was performed from the level of the jackie to the vertex prior to and following the IV administration of intravenous contrast.  Axial, sagittal and coronal reconstructions and maximum intensity projection reconstructions were performed. Arterial stenosis percentages are based on NASCET measurement criteria.    Automated tube current modulation, weight-based exposure dosing, and/or iterative reconstruction technique utilized to reach lowest reasonably achievable exposure rate.    DLP: 2045 mGycm    COMPARISON:  CT head 12/19/2023 at 10:57 hours    FINDINGS:  CTA NECK:    AORTIC ARCH AND GREAT VESSELS: 2 vessel left aortic arch.    RIGHT ICA: There is atherosclerotic plaque at the carotid bulb and proximal internal carotid artery with less than 50% stenosis.  There is irregular contour and inhomogeneous enhancement of the cervical carotid artery at the skull base and at the petrous carotid artery (for example series 1, image 289).    LEFT ICA: Mild atherosclerotic plaque at the carotid bulb without hemodynamically significant stenosis.    VERTEBRAL ARTERIES: Diminutive vertebral arteries without stenosis.    CTA HEAD:    ANTERIOR CIRCULATION: On the arterial  phase imaging there is asymmetric hypo perfusion and irregular appearance of the cervical carotid artery on the right.  There is poor enhancement at the HÉCTOR and M1 segment.  Contrast fades distally towards the M2 segment.  There is gross asymmetric hypoenhancement of the vasculature at the sylvian fissure when comparing right to left on the arterial phase.  On a slightly delayed phase obtained approximately 30-40 seconds later there is enhancement at the right anterior circulation which is now more symmetric compared to the left suggesting potential delayed in flow phenomenon or perfusion via the [id number].  The left anterior circulation enhances normally without significant stenosis.    POSTERIOR CIRCULATION: No hemodynamically significant stenosis, aneurysmal dilatation, or dissection.    NON-VASCULAR STRUCTURES (LIMITED EVALUATION): There is very subtle loss of gray-white differentiation in the region of the insular cortex and right frontal lobe and slight blurring of delineation of the basal ganglia on the right.  No appreciable hemorrhage.    Poor dentition.  Dental caries and periapical lucency.                                       CT HEAD FOR STROKE (Final result)  Result time 12/19/23 11:33:25   Procedure changed from CT Head Without Contrast     Final result by Tani Calderon MD (12/19/23 11:33:25)                   Impression:      No acute intracranial findings or significant interval change compared to May 2023.      Electronically signed by: Tani Calderon  Date:    12/19/2023  Time:    11:33               Narrative:    EXAMINATION:  CT HEAD FOR STROKE    CLINICAL HISTORY:  Neuro deficit, acute, stroke suspected;    TECHNIQUE:  CT imaging of the head performed from the skull base to the vertex without intravenous contrast.  mGycm. Automatic exposure control, adjustment of mA/kV or iterative reconstruction technique was used to reduce radiation.    COMPARISON:  23 May  2023    FINDINGS:  There is no acute cortical infarct, hemorrhage or mass lesion.  No new parenchymal attenuation abnormality.  Ventricular size is stable.  There are vascular calcifications.    Visualized paranasal sinuses and mastoid air cells are clear.                                    EKG:        Telemetry:  AF    Physical Exam  Vitals and nursing note reviewed.   Constitutional:       General: He is not in acute distress.     Appearance: He is ill-appearing.   HENT:      Head:      Comments: Cerebral Dressing in Place Post Craniectomy.  Neck:      Comments: RIJ Venous Cath  Cardiovascular:      Rate and Rhythm: Tachycardia present. Rhythm irregular.      Heart sounds: Normal heart sounds.      Comments: AF RVR  Pulmonary:      Effort: No respiratory distress.      Comments: Intubation/Mechanical Ventilation FIO2 40%  Abdominal:      Palpations: Abdomen is soft.      Comments: OG Tube   Genitourinary:     Comments: Fernandez Catheter  Musculoskeletal:      Comments: Legs are Warm   Skin:     General: Skin is warm and dry.   Neurological:      Comments: Awakes off of sedation       Home Medications:   No current facility-administered medications on file prior to encounter.     Current Outpatient Medications on File Prior to Encounter   Medication Sig Dispense Refill    aspirin 81 MG Chew chew and swallow 1 tablet by mouth daily 90 tablet 3    atorvastatin (LIPITOR) 40 MG tablet Take 1 tablet (40 mg total) by mouth once daily. 90 tablet 1    diltiaZEM (CARDIZEM CD) 360 MG 24 hr capsule Take 1 capsule (360 mg total) by mouth once daily. 90 capsule 1    losartan (COZAAR) 50 MG tablet Take 1 tablet (50 mg total) by mouth once daily. 90 tablet 1    metoprolol succinate (TOPROL-XL) 200 MG 24 hr tablet Take 1 tablet (200 mg total) by mouth 2 (two) times daily. 180 tablet 1    omeprazole (PRILOSEC) 20 MG capsule Take 1 capsule (20 mg total) by mouth once daily. One tab by mouth daily 30 capsule 2    potassium chloride  (KLOR-CON) 20 mEq Pack Take 20 mEq by mouth once daily. 30 packet 5    spironolactone (ALDACTONE) 50 MG tablet Take 1 tablet (50 mg total) by mouth once daily. 90 tablet 1    torsemide (DEMADEX) 10 MG Tab Take 1 tablet (10 mg total) by mouth once daily. 30 tablet 0    vitamin D (VITAMIN D3) 1000 units Tab Take 1 tablet (1,000 Units total) by mouth once daily. 30 tablet 1    XARELTO 20 mg Tab TAKE 1 TABLET BY MOUTH DAILY with SUPPER 90 tablet 3    albuterol (PROVENTIL/VENTOLIN HFA) 90 mcg/actuation inhaler Inhale 2 puffs into the lungs every 6 (six) hours as needed for Wheezing. Rescue (Patient not taking: Reported on 8/22/2023) 8.5 g 0    cetirizine (ZYRTEC) 10 MG tablet Take 1 tablet (10 mg total) by mouth once daily. for 14 days 14 tablet 0     Current Inpatient Medications:    Current Facility-Administered Medications:     0.9%  NaCl infusion (for blood administration), , Intravenous, Q24H PRN, Fidel Kraus, AGAKATERINP-BC    0.9%  NaCl infusion, , Intravenous, Continuous, Ijeoma Hernandez NP, Last Rate: 75 mL/hr at 12/23/23 0410, New Bag at 12/23/23 0410    acetaminophen oral solution 650 mg, 650 mg, Oral, Q4H PRN, Kenny Richter, DO, 650 mg at 12/23/23 0411    aspirin chewable tablet 81 mg, 81 mg, Oral, Daily, Maria Victoria Rosales NP, 81 mg at 12/23/23 0927    dexmedetomidine (PRECEDEX) 400mcg/100mL 0.9% NaCL infusion, 0-1.4 mcg/kg/hr, Intravenous, Continuous, Italo Orozco MD, Last Rate: 5.5 mL/hr at 12/23/23 0833, 0.2 mcg/kg/hr at 12/23/23 0833    dextrose 10% bolus 125 mL 125 mL, 12.5 g, Intravenous, PRN, Eleazar Westbrook MD    dextrose 10% bolus 250 mL 250 mL, 25 g, Intravenous, PRN, Eleazar Westbrook MD    diltiaZEM 125 mg in dextrose 5 % 125 mL IVPB (ready to mix) (titrating), 0-15 mg/hr, Intravenous, Continuous, Leopoldo Mullins T, FNP, Last Rate: 10 mL/hr at 12/23/23 0520, 10 mg/hr at 12/23/23 0520    diltiaZEM injection 10 mg, 10 mg, Intravenous, Once, Leopoldo Mullins T, FNP     fentaNYL injection 50 mcg, 50 mcg, Intravenous, Q1H PRN, Eleazar Westbrook MD, 50 mcg at 12/22/23 1015    glucagon (human recombinant) injection 1 mg, 1 mg, Intramuscular, PRN, Eleazar Westbrook MD    hydrALAZINE injection 10 mg, 10 mg, Intravenous, Q4H PRN, 10 mg at 12/22/23 1718 **AND** labetaloL injection 10 mg, 10 mg, Intravenous, Q4H PRN, Gasper Barlow MD, 10 mg at 12/22/23 0138    insulin aspart U-100 injection 0-5 Units, 0-5 Units, Subcutaneous, Q6H PRN, Eleazar Westbrook MD, 2 Units at 12/20/23 0624    levETIRAcetam in NaCl (iso-os) IVPB 1,000 mg, 1,000 mg, Intravenous, Q12H, Italo Orozco MD, Stopped at 12/23/23 0957    mannitol 20% infusion 75 g, 75 g, Intravenous, Once, Fidel Kraus, AGAP-BC    metoprolol injection 5 mg, 5 mg, Intravenous, Q5 Min PRN, Patrick Gipson, DO, 5 mg at 12/22/23 0904    mupirocin 2 % ointment, , Nasal, BID, Italo Orozco MD, Given at 12/23/23 0832    NORepinephrine 8 mg in dextrose 5% 250 mL infusion, 0-3 mcg/kg/min, Intravenous, Continuous, Italo Orozco MD, Stopped at 12/20/23 1929    ondansetron injection 8 mg, 8 mg, Intravenous, Q6H PRN, Patrick Gipson, DO, 8 mg at 12/20/23 0045    propofol (DIPRIVAN) 10 mg/mL infusion, 0-50 mcg/kg/min, Intravenous, Continuous, Gasper Barlow MD, Last Rate: 33 mL/hr at 12/23/23 0831, 50 mcg/kg/min at 12/23/23 0831    sodium chloride 0.9% flush 10 mL, 10 mL, Intravenous, PRN, Ijeoma Hernandez, NP    VTE Risk Mitigation (From admission, onward)           Ordered     Reason for No Pharmacological VTE Prophylaxis  Once        Question:  Reasons:  Answer:  Risk of Bleeding    12/19/23 1551     IP VTE HIGH RISK PATIENT  Once         12/19/23 1551     Place sequential compression device  Until discontinued         12/19/23 1551                  Assessment:   Persistent Atrial Fibrillation with Intermittent RVR- now rate controlled     - [id number]: 4    - Anticoagulation on Hold in Setting of  Cerebral Bleed Post Stroke  Acute Ischemic CVA (Right ICA & M3 Branch) with Hemorrhagic Conversion Requiring Craniectomy    - Status Post Thrombectomy  Acute Hypoxemic Respiratory Failure Requiring Intubation/Mechanical Ventilation  History of VF Arrest due to Prolonged QT Interval/Hypokalemia    - Status Post Dual Chamber ICD     - Normal Coronaries on 3.8.18    - History of MI in 2003 (No Angiographic Evidence of CAD in 2018)  Hypertension- Above Gal Requiring Cleviprex Infusion  Hyperlipidemia  Valvular Heart Disease    - MR: Mild to Moderate  Elevated TSH  Obstructive Sleep Apnea    Plan:   Continue current therapy  Cardizem Infusion & Titrate for Goal HR < 100.  Lopressor IV PRN HR > 120 Sustained.  Not on Anticoagulation given current clinic condition/Cerebral Bleed/Hemorrhagic Transformation. Will look for clearance by Neurological Team before considering long-term anticoagulation therapy.  Vent Management as per ICU Team  Continue Supportive Care as per Nursing Team.      TRUMAN Sumner  Cardiology  [facility] - [address] [place] [address]  12/23/2023 8:10 AM     I have seen the patient, reviewed the Nurse Practitioner's note, assessment and plan. I have personally interviewed and examined the patient at bedside and agree with the findings. Medical decision making listed above were done under my guidance.

## 2023-12-23 NOTE — OP NOTE
DATE OF OPERATION:   December 20, 2023     PREOPERATIVE DIAGNOSIS:   1. 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 Right MCA infarct with malignant intracranial hypertension     SURGEON:  Artem Can M.D.    ASSISTANT:  FUNMI     PROCEDURE:   1. Right frontotemporoparietal decompressive hemicraniectomy     ANESTHESIA:   General endotracheal     BLOOD LOSS:   100     SPECIMEN(s):   None    COMPLICATIONS:   None     DRAINS:   Subgaleal/epidural KRISHNA to thumbprint suction     HISTORY:   The patient is a 68-year-old gentleman with multiple medical comorbidities who presented 12/19/2023 to the emergency room with evidence of a right sided CVA.  He underwent mechanical thrombectomy.  This morning on 12/20/2023 he had a rapid deterioration in his neurologic status with CT showing, as expected, a large right MCA distribution infarct with marked right-to-left shift.  The patient was given mannitol and, after seeing him improved to the point where he was following some simple commands, discussion with the family was carried out in surgery was elected.  The patient/family understood and accepted the nature of this surgery as well as its attendant risks.        FINDINGS:   There were no untoward findings.  As expected, the brain full.  He tolerated the procedure well.    PROCEDURE IN DETAIL:   After endotracheal intubation and induction of general anesthesia, a roll was placed under the ipsilateral shoulder and the head turned contralaterally and elevated. The head was placed in the Bass 3-point pin fixation head rest and the head was shaved. A very generous question sissy/reverse question sissy incision was marked out and, after prepping and draping in the usual fashion, it was infiltrated with local anesthetic containing epinephrine. The incision was carried down through the skin and subcutaneous tissues with a knife. Brenna clips were applied to the scalp edges and then the  temporalis muscle was divided with unipolar cautery and a musculocutaneous flap was reflected in the subperiosteal plane.  Self-retaining retractors and/or self-retaining hooks were used. The air-driven  was used to enter the cranium circumferentially and then the craniotome was used to elevate a large frontotemporoparietal hemicraniectomy flap.  Fibrillar Surgicel cigarettes were placed circumferentially.  Dural tack-up sutures were used only if necessary to stop epidural bleeding.  Then the dura was opened in a radial fashion to allow for complete brain expansion.  An onlay graft of dural substitute was used as well as dural sealant.  A drain was placed in the subgaleal/epidural space and brought out through a separate stab incision.  Seprafilm was placed underneath the temporalis muscle as it was reflected back over brain surface.. The temporalis fascia was then closed with 3-0 Vicryl in an interrupted fashion. The scalp was closed with 2-0 Vicryl for the galea and staples for the skin edges.  A full neurosurgical head dressing was applied.  The patient was then taken to the post anesthetic care unit in satisfactory condition with correct sponge and needle counts.

## 2023-12-23 NOTE — PROGRESS NOTES
Inpatient Nutrition Assessment    Admit Date: 12/19/2023   Total duration of encounter: 4 days   Patient Age: 68 y.o.    Nutrition Recommendation/Prescription     Start tube feeding when appropriate.  Tube feeding recommendation:   Peptamen Intense VHP goal rate 50 ml/hr +3 packets ProSource TF20 daily to provide  1240 kcal/d (81% est needs, 103% with meds)  153 g protein/d (97% est needs)  840 ml free water/d   (calculations based on estimated 20 hr/d run time)     If unable to begin Tube feeds by tomorrow 12/24, recommend to begin TPN:  Clinimix 5/15 @ 60mL/hr until propofol is d/c'd. Will provide  1022 kcal/d (100% est needs, 123% with meds)  72 gm pro/d (62% est needs)    Communication of Recommendations: reviewed with nurse    Nutrition Assessment     Malnutrition Assessment/Nutrition-Focused Physical Exam    Malnutrition Context: acute illness or injury (12/21/23 1406)  Malnutrition Level:  (does not meet criteria) (12/21/23 1406)  Energy Intake (Malnutrition):  (unable to eval) (12/21/23 1406)  Weight Loss (Malnutrition):  (unable to eval) (12/21/23 1406)  Subcutaneous Fat (Malnutrition):  (does not meet criteria) (12/21/23 1406)           Muscle Mass (Malnutrition):  (does not meet criteria) (12/21/23 1406)                          Fluid Accumulation (Malnutrition):  (does not meet criteria) (12/21/23 1406)        A minimum of two characteristics is recommended for diagnosis of either severe or non-severe malnutrition.    Chart Review    Reason Seen: continuous nutrition monitoring and follow-up    Malnutrition Screening Tool Results   Have you recently lost weight without trying?: No  Have you been eating poorly because of a decreased appetite?: No   MST Score: 0   Diagnosis:  CVA s/p right ICA and MCA M3  branch thrombectomy s/p craniectomy with hemorrhagic conversion  Acute hypoxic respiratory failure  Atrial fibrillation    Relevant Medical History: Afib, HTN, CAD, CAD (STEMI 2003), HFpEF     Scheduled  Medications:  aspirin, 300 mg, Daily  diltiaZEM, 10 mg, Once  levETIRAcetam (Keppra) IV (PEDS and ADULTS), 1,000 mg, Q12H  mannitol 20%, 75 g, Once  mupirocin, , BID  potassium phosphate IVPB, 20 mmol, Once    Continuous Infusions:  sodium chloride 0.9%, Last Rate: 75 mL/hr at 12/23/23 0410  dexmedeTOMIDine (Precedex) infusion (titrating), Last Rate: 0.2 mcg/kg/hr (12/23/23 0833)  dilTIAZem, Last Rate: 10 mg/hr (12/23/23 0520)  NORepinephrine bitartrate-D5W, Last Rate: Stopped (12/20/23 1929)  propofoL, Last Rate: 50 mcg/kg/min (12/23/23 0831)    PRN Medications: 0.9%  NaCl infusion (for blood administration), acetaminophen, dextrose 10%, dextrose 10%, fentaNYL, glucagon (human recombinant), hydrALAZINE **AND** labetalol, insulin aspart U-100, metoprolol, ondansetron, sodium chloride 0.9%    Calorie Containing IV Medications: Diprivan @ 33 ml/hr (provides 871 kcal/d)    Recent Labs   Lab 12/19/23  1055 12/19/23  1132 12/19/23  1132 12/19/23  1145 12/19/23  2253 12/20/23  0754 12/20/23  1209 12/20/23  1903 12/21/23  0026 12/21/23  0737 12/21/23  1731 12/22/23  0018 12/22/23  0538 12/22/23  1247 12/22/23  1811 12/23/23  0036 12/23/23  0618   NA  --  140   < >  --  140 137   < > 144 147*   < > 147* 150* 163* 150* 152* 153* 151*   K  --  4.4  --   --  3.7 3.8  --  3.8 3.8  --   --  3.6  --   --   --  3.5  --    CALCIUM  --  9.1  --   --  8.4* 8.5*  --  8.0* 8.1*  --   --  8.0*  --   --   --  8.3*  --    PHOS  --   --   --   --  3.1 2.0*  --   --  2.0*  --   --  1.9*  --   --   --  1.6*  --    MG  --   --   --   --  1.90 2.10  --   --  2.20  --   --  2.20  --   --   --  2.30  --    CHLORIDE  --  106  --   --  109* 105  --  113* 117*  --   --  118*  --   --   --  119*  --    CO2  --  25  --   --  20* 22*  --  23 24  --   --  25  --   --   --  25  --    BUN  --  13.5  --   --  12.0 10.4  --  10.0 9.9  --   --  10.2  --   --   --  12.5  --    CREATININE  --  1.32*  --   --  0.99 0.94  --  1.02 0.95  --   --  0.97  --   --    --  1.04  --    EGFRNORACEVR  --  59  --   --  >60 >60  --  >60 >60  --   --  >60  --   --   --  >60  --    GLUCOSE  --  137*  --   --  166* 207*  --  135* 119*  --   --  147*  --   --   --  114  --    BILITOT  --  2.1*  --   --   --  1.8*  --   --  1.4  --   --  1.4  --   --   --  1.6*  --    ALKPHOS  --  66  --   --   --  62  --   --  58  --   --  56  --   --   --  58  --    ALT  --  25  --   --   --  22  --   --  23  --   --  28  --   --   --  33  --    AST  --  33  --   --   --  27  --   --  33  --   --  38*  --   --   --  36*  --    ALBUMIN  --  4.3  --   --   --  3.9  --   --  3.3*  --   --  3.2*  --   --   --  2.8*  --    TRIG  --  124  --   --   --   --   --   --   --   --   --   --   --   --   --   --   --    HGBA1C  --   --   --   --   --  5.6  --   --   --   --   --   --   --   --   --   --   --    WBC  --   --   --  9.10  --  25.65  25.65*  --  21.71* 15.78*  --   --  18.52*  --   --   --  15.44*  --    HGB  --   --   --  16.0  --  14.8  --  13.5* 13.0*  --   --  12.8*  --   --   --  12.6*  --    HCT 51  --   --  48.4  --  43.8  --  39.8* 39.8*  --   --  40.0*  --   --   --  40.4*  --     < > = values in this interval not displayed.     Nutrition Orders:  Diet NPO      Appetite/Oral Intake: NPO/not applicable  Factors Affecting Nutritional Intake: on mechanical ventilation  Food/Bah[place]/Cultural Preferences: unable to obtain  Food Allergies: none reported  Last Bowel Movement: 12/19/23  Wound(s):  incision noted    Comments    12/21/23: Discussed with RN. Will provide tube feeding recommendations for when appropriate to start tube feeding. Receiving kcal from meds.      12/22/23: Patient remains intubated, receiving kcal from meds. Cleviprex d/c'ed this morning, Diprivan continues. Patient currently with OG tube to LIS.     12/23/23: Pt with significant output via NG tube. Propofol infusion increased and Precedex started. Will leave TPN recommendations if Tube feeds are unable to be initiated by  "tomorrow.     Anthropometrics    Height: 5' 10.98" (180.3 cm),    Last Weight: 110 kg (242 lb 8.1 oz) (12/19/23 1645), Weight Method: Bed Scale  BMI (Calculated): 33.8  BMI Classification: obese grade I (BMI 30-34.9)        Ideal Body Weight (IBW), Male: 171.88 lb     % Ideal Body Weight, Male (lb): 140.99 %                          Usual Weight Provided By: unable to obtain usual weight    Wt Readings from Last 5 Encounters:   12/19/23 110 kg (242 lb 8.1 oz)   12/11/23 112.7 kg (248 lb 7.3 oz)   12/05/23 112.3 kg (247 lb 9.6 oz)   11/07/23 109.5 kg (241 lb 6.5 oz)   10/30/23 113.9 kg (251 lb 1.7 oz)     Weight Change(s) Since Admission:   Wt Readings from Last 1 Encounters:   12/19/23 1645 110 kg (242 lb 8.1 oz)   12/19/23 1053 110 kg (242 lb 8.1 oz)   Admit Weight: 110 kg (242 lb 8.1 oz) (12/19/23 1053), Weight Method: Bed Scale    Estimated Needs    Weight Used For Calorie Calculations: 110 kg (242 lb 8.1 oz)  Energy Calorie Requirements (kcal): 1210-1540kcal (11-14kcal/kg)  Energy Need Method: Kcal/kg  Weight Used For Protein Calculations: 78.2 kg (172 lb 6.4 oz) (IBW)  Protein Requirements: 157gm (2g/kg IBW)  Fluid Requirements (mL): 2750mL (25mL/kg CBW) or per MD    Enteral Nutrition     Patient not receiving enteral nutrition at this time.    Parenteral Nutrition     Patient not receiving parenteral nutrition support at this time.    Evaluation of Received Nutrient Intake    Calories: not meeting estimated needs  Protein: not meeting estimated needs    Patient Education     Not applicable.    Nutrition Diagnosis     PES: Inadequate oral intake related to acute illness as evidenced by intubation since 12/19/23. (active)     Nutrition Interventions     Intervention(s): modified composition of enteral nutrition, modified composition of parenteral nutrition, and collaboration with other providers    Goal: Meet greater than 80% of nutritional needs by follow-up. (goal not met)  Goal: Tolerate enteral feeding at " goal rate by follow-up. (goal not met)    Nutrition Goals & Monitoring     Dietitian will monitor: energy intake    Nutrition Risk/Follow-Up: high (follow-up in 1-4 days)   Please consult if re-assessment needed sooner.

## 2023-12-23 NOTE — NURSING
Nurses Note -- 4 Eyes      12/23/2023   5:54 PM      Skin assessed during: Daily Assessment      [] No Altered Skin Integrity Present    [x]Prevention Measures Documented      [x] Yes- Altered Skin Integrity Present or Discovered   [] LDA Added if Not in Epic (Describe Wound)   [] New Altered Skin Integrity was Present on Admit and Documented in LDA   [] Wound Image Taken    Wound Care Consulted? No    Attending Nurse:  MÓNICA Steele    Second RN/Staff Member:   MÓNICA Evans

## 2023-12-23 NOTE — PROGRESS NOTES
POD#3 right craniectomy for CVA due to thrombosis of right middle cerebral artery     Intubated mechanically ventilated   Transitioning propofol to Precedex.    Current sodium is 151     Vital signs are stable.  Pinpoint pupils possibly sluggishly reactive as sedation is being weaned down.  Withdraws right upper and lower extremity.  Nursing reports seeing the left lower extremity withdraw to pain.  +cough, corneal and gag    Turban dressing intact, dry  Flap is full but not tense.  KRISHNA output not documented during night.  Currently there is about 20-25 mL serosanguineous in bulb.    CT head without contrast performed yesterday stable.    Plan:   Continue Q6 hour Na checks  Continue current dressing  Continue drain.  We will likely discontinue tomorrow.  Bone flap precautions  Transition Diprivan to Precedex.    Hourly neurological exams  Continue BP parameters per neurology  Keppra BID, seizure precautions  SCDs for DVT prophylaxis; hold anticoagulation for now    Post-Op Info:  Procedure(s) (LRB):  CRANIECTOMY (Right)   3 Days Post-Op      Medications:  Continuous Infusions:   sodium chloride 0.9% 75 mL/hr at 12/23/23 0410    dexmedeTOMIDine (Precedex) infusion (titrating) 0.2 mcg/kg/hr (12/23/23 0833)    dilTIAZem 10 mg/hr (12/23/23 0520)    NORepinephrine bitartrate-D5W Stopped (12/20/23 1929)    propofoL 50 mcg/kg/min (12/23/23 0831)     Scheduled Meds:   aspirin  81 mg Oral Daily    diltiaZEM  10 mg Intravenous Once    levETIRAcetam (Keppra) IV (PEDS and ADULTS)  1,000 mg Intravenous Q12H    mannitol 20%  75 g Intravenous Once    mupirocin   Nasal BID     PRN Meds:0.9%  NaCl infusion (for blood administration), acetaminophen, dextrose 10%, dextrose 10%, fentaNYL, glucagon (human recombinant), hydrALAZINE **AND** labetalol, insulin aspart U-100, metoprolol, ondansetron, sodium chloride 0.9%     Review of Systems  Objective:     Weight: 110 kg (242 lb 8.1 oz)  Body mass index is 33.84 kg/m².  Vital Signs (Most  Recent):  Temp: 99.8 °F (37.7 °C) (12/23/23 0615)  Pulse: 88 (12/23/23 0700)  Resp: (!) 21 (12/23/23 0700)  BP: 116/76 (12/23/23 0700)  SpO2: 100 % (12/23/23 0700) Vital Signs (24h Range):  Temp:  [99.3 °F (37.4 °C)-101.3 °F (38.5 °C)] 99.8 °F (37.7 °C)  Pulse:  [] 88  Resp:  [4-28] 21  SpO2:  [97 %-100 %] 100 %  BP: ()/() 116/76     Date 12/23/23 0700 - 12/24/23 0659   Shift 7699-9900 7781-5253 4829-0462 24 Hour Total   INTAKE   Shift Total(mL/kg)       OUTPUT   Urine(mL/kg/hr) 125   125   Shift Total(mL/kg) 125(1.1)   125(1.1)   Weight (kg) 110 110 110 110              Vent Mode: A/C  Oxygen Concentration (%):  [40] 40  Resp Rate Total:  [20 br/min-29 br/min] 20 br/min  Vt Set:  [475 mL] 475 mL  PEEP/CPAP:  [5 cmH20] 5 cmH20  Mean Airway Pressure:  [7 nnS44-26 cmH20] 9 cmH20             Closed/Suction Drain Right Scalp (Active)   Site Description Unable to view 12/23/23 0800   Dressing Type Gauze 12/23/23 0800   Dressing Status Clean;Dry;Intact 12/23/23 0800   Dressing Intervention Integrity maintained 12/23/23 0800   Drainage Serosanguineous 12/23/23 0800   Status To bulb suction 12/23/23 0800   Output (mL) 15 mL 12/22/23 1841            NG/OG Tube 12/20/23 0930 16 Fr. Center mouth (Active)   Placement Check placement verified by aspirate characteristics 12/23/23 0800   Distal Tube Length (cm) 75 12/23/23 0800   Tolerance no signs/symptoms of discomfort 12/23/23 0800   Securement secured to commercial device 12/23/23 0800   Clamp Status/Tolerance unclamped 12/23/23 0800   Suction Setting/Drainage Method suction at;low;intermittent setting 12/23/23 0800   Insertion Site Appearance no redness, warmth, tenderness, skin breakdown, drainage 12/23/23 0800   Drainage Green;Bile;Brown 12/23/23 0800   Flush/Irrigation flushed w/;water;no resistance met 12/23/23 0800   Tube Output(mL)(Include Discarded Residual) 300 mL 12/22/23 0653            Urethral Catheter 12/20/23 1007 (Active)   $ Fernandez Insertion  "Bedside Insertion Performed 12/20/23 0930   Site Assessment Clean;Intact;Dry 12/23/23 0800   Collection Container Standard drainage bag 12/23/23 0800   Securement Method secured to top of thigh w/ adhesive device 12/23/23 0800   Catheter Care Performed yes 12/23/23 0800   Reason for Continuing Urinary Catheterization Critically ill in ICU and requiring hourly monitoring of intake/output 12/23/23 0800   CAUTI Prevention Bundle Securement Device in place with 1" slack;Intact seal between catheter & drainage tubing;Drainage bag/urimeter off the floor;No dependent loops or kinks;Sheeting clip in use;Drainage bag/urimeter below bladder;Drainage bag/urimeter not overfilled (<2/3 full) 12/23/23 0800   Output (mL) 125 mL 12/23/23 0800       Neurosurgery Physical Exam    Significant Labs:  Recent Labs   Lab 12/22/23  0018 12/22/23  0538 12/22/23  1811 12/23/23  0036 12/23/23  0618   *   < > 152* 153* 151*   K 3.6  --   --  3.5  --    CO2 25  --   --  25  --    BUN 10.2  --   --  12.5  --    CREATININE 0.97  --   --  1.04  --    CALCIUM 8.0*  --   --  8.3*  --    MG 2.20  --   --  2.30  --     < > = values in this interval not displayed.     Recent Labs   Lab 12/22/23  0018 12/23/23  0036   WBC 18.52* 15.44*   HGB 12.8* 12.6*   HCT 40.0* 40.4*    138     No results for input(s): "LABPT", "INR", "APTT" in the last 48 hours.  Microbiology Results (last 7 days)       Procedure Component Value Units Date/Time    Tissue Culture - Aerobic [[id number]] Collected: 12/20/23 1752    Order Status: Completed Specimen: Bone from Skull Updated: 12/23/23 0811     CULTURE, TISSUE- AEROBIC ([facility]) No Growth At 72 Hours    Blood Culture [[id number]]     Order Status: Sent Specimen: Blood from Central Line     Blood Culture [[id number]]     Order Status: Sent Specimen: Blood from IV Site     Anaerobic Culture [[id number]] Collected: 12/20/23 7428    Order Status: Completed Specimen: Bone from Skull Updated: 12/23/23 0754     Anaerobe " Culture No Anaerobes Isolated    Urine culture [[id number]] Collected: 12/23/23 0523    Order Status: Sent Specimen: Urine Updated: 12/23/23 0700            Active Diagnoses:    Diagnosis Date Noted POA    PRINCIPAL PROBLEM:  Stroke [I63.9] 12/19/2023 Yes      Problems Resolved During this Admission:       BLAIR Bey-BC  Neurosurgery  [facility] - [facility]

## 2023-12-23 NOTE — PROGRESS NOTES
[facility] - [facility]  Pulmonary Critical Care Note    Patient Name: Delio Daniel Jr.  MRN: [id number]  Admission Date: 12/19/2023  Hospital Length of Stay: 4 days  Code Status: Full Code  Attending Provider: Italo Orozco MD  Primary Care Provider: Candy, Primary Doctor     Subjective:     HPI:   Delio Daniel Jr. is a 68 y.o. 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 His last known normal was 9:15 per EMS. Stroke protocol in ED initiated. Unofficial CT head showed possible dense R MCA. Pt taken to cath lab for BRAD thrombectomy. Admitted to ICU for post-operative care and monitoring.     Hospital Course/Significant events:  12/19/2023 - Admitted to ICU s/p right internal carotid artery thrombectomy  12/20/2023 - s/p craniectomy    24 Hour Interval History:  Tmax of 101.2 F overnight. Current drips: diltiazem at 15 mg/hr, propofol at 50. Labs this AM: Improving leukocytosis WBC 15.44k, H/H stable, Na+ 153, phos 1.6. I/O: net positive 1.5 L. Will replete electrolytes with kphos. Will obtain urinalysis to assess for presence of bacteria. Will consider obtaining blood cultures if fever becomes persistent.     Past Medical History:   Diagnosis Date    Arthritis     Atrial fibrillation     BPH (benign prostatic hyperplasia)     Cardiac arrest     Coronary artery disease     Cyst, kidney, acquired     Diverticulosis     Hyperlipidemia     Hypertension     MI (myocardial infarction)     Obesity     Steatosis of liver        Past Surgical History:   Procedure Laterality Date    A-V CARDIAC PACEMAKER INSERTION Right     CARDIAC CATHETERIZATION       COLONOSCOPY W/ BIOPSIES      CRANIECTOMY Right 12/20/2023    Procedure: CRANIECTOMY;  Surgeon: Artem Can MD;  Location: [facility] OR;  Service: Neurosurgery;  Laterality: Right;    excision of colon         Social History     Socioeconomic History    Marital status:     Number of children: 9   Occupational History    Occupation: retired   Tobacco Use    Smoking status: Former    Smokeless tobacco: Never   Substance and Sexual Activity    Alcohol use: Not Currently    Drug use: Not Currently    Sexual activity: Not Currently     Partners: Female     Social Determinants of Health     Financial Resource Strain: Low Risk  (10/19/2022)    Overall Financial Resource Strain (CARDIA)     Difficulty of Paying Living Expenses: Not hard at all   Food Insecurity: No Food Insecurity (10/19/2022)    Hunger Vital Sign     Worried About Running Out of Food in the Last Year: Never true     Ran Out of Food in the Last Year: Never true   Transportation Needs: No Transportation Needs (10/19/2022)    PRAPARE - Transportation     Lack of Transportation (Medical): No     Lack of Transportation (Non-Medical): No   Physical Activity: Sufficiently Active (10/19/2022)    Exercise Vital Sign     Days of Exercise per Week: 6 days     Minutes of Exercise per Session: 60 min   Stress: No Stress Concern Present (10/19/2022)    [employer] - Occupational Stress Questionnaire     Feeling of Stress : Not at all   Social Connections: Unknown (10/19/2022)    Social Connection and Isolation Panel [NHANES]     Frequency of Communication with Friends and Family: More than three times a week     Frequency of Social Gatherings with Friends and Family: More than three times a week     Attends Hoahaoism Services: More than 4 times per year     Active Member of Clubs or Organizations: No     Attends Club or Organization Meetings: Never   Housing Stability: Low Risk  (10/19/2022)    Housing Stability Vital Sign     Unable  to Pay for Housing in the Last Year: No     Number of Places Lived in the Last Year: 1     Unstable Housing in the Last Year: No       Current Outpatient Medications   Medication Instructions    albuterol (PROVENTIL/VENTOLIN HFA) 90 mcg/actuation inhaler 2 puffs, Inhalation, Every 6 hours PRN, Rescue    aspirin 81 MG Chew chew and swallow 1 tablet by mouth daily    atorvastatin (LIPITOR) 40 mg, Oral, Daily    cetirizine (ZYRTEC) 10 mg, Oral, Daily    diltiaZEM (CARDIZEM CD) 360 mg, Oral, Daily    losartan (COZAAR) 50 mg, Oral, Daily    metoprolol succinate (TOPROL-XL) 200 mg, Oral, 2 times daily    omeprazole (PRILOSEC) 20 mg, Oral, Daily, One tab by mouth daily    potassium chloride (KLOR-CON) 20 mEq Pack 20 mEq, Oral, Daily    spironolactone (ALDACTONE) 50 mg, Oral, Daily    torsemide (DEMADEX) 10 mg, Oral, Daily    vitamin D (VITAMIN D3) 1,000 Units, Oral, Daily    XARELTO 20 mg Tab TAKE 1 TABLET BY MOUTH DAILY with SUPPER     Current Inpatient Medications   aspirin  300 mg Rectal Daily    diltiaZEM  10 mg Intravenous Once    levETIRAcetam (Keppra) IV (PEDS and ADULTS)  1,000 mg Intravenous Q12H    mannitol 20%  75 g Intravenous Once    mupirocin   Nasal BID     Current Intravenous Infusions   sodium chloride 0.9% 75 mL/hr at 12/23/23 0410    dexmedeTOMIDine (Precedex) infusion (titrating) Stopped (12/20/23 2104)    dilTIAZem 15 mg/hr (12/22/23 2019)    NORepinephrine bitartrate-D5W Stopped (12/20/23 1929)    propofoL 50 mcg/kg/min (12/23/23 0412)       Objective:     Intake/Output Summary (Last 24 hours) at 12/23/2023 0429  Last data filed at 12/22/2023 1841  Gross per 24 hour   Intake 1860.89 ml   Output 1285 ml   Net 575.89 ml     Vital Signs (Most Recent):  Temp: (!) 101.2 °F (38.4 °C) (12/23/23 0411)  Pulse: 98 (12/23/23 0415)  Resp: (!) 25 (12/23/23 0415)  BP: 126/79 (12/23/23 0415)  SpO2: 100 % (12/23/23 0415)  Body mass index is 33.84 kg/m².  Weight: 110 kg (242 lb 8.1 oz) Vital Signs (24h Range):  Temp:   [99.3 °F (37.4 °C)-101.3 °F (38.5 °C)] 101.2 °F (38.4 °C)  Pulse:  [] 98  Resp:  [4-28] 25  SpO2:  [97 %-100 %] 100 %  BP: ()/() 126/79     Physical Exam  General: Intubated  HEENT: NC/AT; pinpoint pupils, sluggish; nasal and oral mucosa moist and clear, ET tube in place  Pulm: CTA bilaterally, mechanically ventilated  CV: Irregular w/o murmurs or gallops; 1+ edema in BLE noted  GI: Bowel sound present in all quadrants, abdomen soft to palpation  MSK: No obvious deformities  Neuro: Limited d/t sedatio    Lines/Drains/Airways       Central Venous Catheter Line  Duration             Percutaneous Central Line Insertion/Assessment - Triple Lumen  12/20/23 1056 Internal Jugular Right 2 days              Drain  Duration                  Closed/Suction Drain Right Scalp -- days         NG/OG Tube 12/20/23 0930 16 Fr. Center mouth 2 days         Urethral Catheter 12/20/23 1007 2 days              Airway  Duration                  Airway - Non-Surgical 12/20/23 0930 Endotracheal Tube 2 days              Peripheral Intravenous Line  Duration                  Peripheral IV - Single Lumen 12/19/23 1045 20 G Lateral;Left Breast 3 days         Peripheral IV - Single Lumen 12/19/23 1053 20 G Right Antecubital 3 days                  Significant Labs:  Lab Results   Component Value Date    WBC 15.44 (H) 12/23/2023    HGB 12.6 (L) 12/23/2023    HCT 40.4 (L) 12/23/2023    MCV 95.1 (H) 12/23/2023     12/23/2023       BMP  Lab Results   Component Value Date     (H) 12/23/2023    K 3.5 12/23/2023    CHLORIDE 119 (H) 12/23/2023    CO2 25 12/23/2023    BUN 12.5 12/23/2023    CREATININE 1.04 12/23/2023    CALCIUM 8.3 (L) 12/23/2023    AGAP 8.0 12/20/2023    EGFRNONAA 61 04/23/2022     ABG  Recent Labs   Lab 12/21/23  0618   PH 7.430   PO2 102.0*   PCO2 40.0   HCO3 26.5*     Mechanical Ventilation Support:  Vent Mode: A/C (12/23/23 0025)  Set Rate: 20 BPM (12/23/23 0025)  Vt Set: 475 mL (12/23/23  0025)  PEEP/CPAP: 5 cmH20 (12/23/23 0025)  Oxygen Concentration (%): 40 (12/23/23 0400)  Peak Airway Pressure: 17 cmH20 (12/23/23 0025)  Total Ve: 9.7 L/m (12/23/23 0025)  F/VT Ratio<105 ([facility]BI): (!) 43.76 (12/23/23 0025)    Significant Imaging:  I have reviewed the pertinent imaging within the past 24 hours.    Assessment/Plan:     Assessment  CVA s/p right ICA and MCA M3  branch thrombectomy s/p craniectomy with hemorrhagic conversion  Acute hypoxic respiratory failure requiring intubation  Atrial fibrillation w/ RVR  Onychomycosis  HX of CAD, HTN, HLD, ADA    Plan  -Continue ICU level of care for ongoing monitoring and medical management  -Continue to wean sedation and mechanical ventilation as tolerated  -Neurosurgery and neurology teams following, appreciate their input  -Per cardiology, digoxin 0.5mg IV x1 given on 12/22/2023 followed by 0.25mg IV push in 6 hours; may use lopressor as needed for sustained HR >120  -Will continue to monitor body temperature, if fever persists, will consider obtaining blood cultures and initiate empiric ABX. Fever this morning might be from inflammatory process    DVT Prophylaxis: SCD (No anticoagulation for 14 days due to petechial hemorrhage in brain)  GI Prophylaxis:     32 minutes of critical care was time spent personally by me on the following activities: development of treatment plan with patient or surrogate and bedside caregivers, discussions with consultants, evaluation of patient's response to treatment, examination of patient, ordering and performing treatments and interventions, ordering and review of laboratory studies, ordering and review of radiographic studies, pulse oximetry, re-evaluation of patient's condition.  This critical care time did not overlap with that of any other provider or involve time for any procedures.     Kenny Richter DO, PGY-II  Pulmonary Critical Care Medicine  [facility] - [facility]

## 2023-12-23 NOTE — NURSING
Nurses Note -- 4 Eyes      12/22/2023   6:26 PM      Skin assessed during: Daily Assessment      [] No Altered Skin Integrity Present    [x]Prevention Measures Documented      [x] Yes- Altered Skin Integrity Present or Discovered   [] LDA Added if Not in Epic (Describe Wound)   [] New Altered Skin Integrity was Present on Admit and Documented in LDA   [] Wound Image Taken    Wound Care Consulted? No    Attending Nurse:  MÓNICA Steele    Second RN/Staff Member:   MÓNICA Severino

## 2023-12-24 LAB
ABO + RH BLD: NORMAL
ABO + RH BLD: NORMAL
ALBUMIN SERPL-MCNC: 2.5 G/DL (ref 3.4–4.8)
ALBUMIN/GLOB SERPL: 0.8 RATIO (ref 1.1–2)
ALLENS TEST BLOOD GAS (OHS): YES
ALP SERPL-CCNC: 55 UNIT/L (ref 40–150)
ALT SERPL-CCNC: 38 UNIT/L (ref 0–55)
AST SERPL-CCNC: 38 UNIT/L (ref 5–34)
BASE EXCESS BLD CALC-SCNC: 3.7 MMOL/L (ref -2–2)
BASOPHILS # BLD AUTO: 0.06 X10(3)/MCL
BASOPHILS NFR BLD AUTO: 0.4 %
BILIRUB SERPL-MCNC: 1.7 MG/DL
BLD PROD TYP BPU: NORMAL
BLD PROD TYP BPU: NORMAL
BLOOD GAS SAMPLE TYPE (OHS): ABNORMAL
BLOOD UNIT EXPIRATION DATE: NORMAL
BLOOD UNIT EXPIRATION DATE: NORMAL
BLOOD UNIT TYPE CODE: 6200
BLOOD UNIT TYPE CODE: 6200
BUN SERPL-MCNC: 16.3 MG/DL (ref 8.4–25.7)
CA-I BLD-SCNC: 1.12 MMOL/L (ref 1.12–1.23)
CALCIUM SERPL-MCNC: 7.7 MG/DL (ref 8.8–10)
CHLORIDE SERPL-SCNC: 116 MMOL/L (ref 98–107)
CO2 BLDA-SCNC: 28.9 MMOL/L
CO2 SERPL-SCNC: 23 MMOL/L (ref 23–31)
COHGB MFR BLDA: 1.1 % (ref 0.5–1.5)
CREAT SERPL-MCNC: 0.87 MG/DL (ref 0.73–1.18)
CROSSMATCH INTERPRETATION: NORMAL
CROSSMATCH INTERPRETATION: NORMAL
DISPENSE STATUS: NORMAL
DISPENSE STATUS: NORMAL
DRAWN BY BLOOD GAS (OHS): ABNORMAL
EOSINOPHIL # BLD AUTO: 0.23 X10(3)/MCL (ref 0–0.9)
EOSINOPHIL NFR BLD AUTO: 1.6 %
ERYTHROCYTE [DISTWIDTH] IN BLOOD BY AUTOMATED COUNT: 15.2 % (ref 11.5–17)
GFR SERPLBLD CREATININE-BSD FMLA CKD-EPI: >60 MLS/MIN/1.73/M2
GLOBULIN SER-MCNC: 3.2 GM/DL (ref 2.4–3.5)
GLUCOSE SERPL-MCNC: 148 MG/DL (ref 82–115)
HCO3 BLDA-SCNC: 27.7 MMOL/L (ref 22–26)
HCT VFR BLD AUTO: 38 % (ref 42–52)
HGB BLD-MCNC: 12.3 G/DL (ref 14–18)
IMM GRANULOCYTES # BLD AUTO: 0.14 X10(3)/MCL (ref 0–0.04)
IMM GRANULOCYTES NFR BLD AUTO: 1 %
INHALED O2 CONCENTRATION: 40 %
LYMPHOCYTES # BLD AUTO: 1.29 X10(3)/MCL (ref 0.6–4.6)
LYMPHOCYTES NFR BLD AUTO: 9 %
MAGNESIUM SERPL-MCNC: 2.2 MG/DL (ref 1.6–2.6)
MCH RBC QN AUTO: 30.3 PG (ref 27–31)
MCHC RBC AUTO-ENTMCNC: 32.4 G/DL (ref 33–36)
MCV RBC AUTO: 93.6 FL (ref 80–94)
MECH RR (OHS): 20 B/MIN
METHGB MFR BLDA: 0.8 % (ref 0.4–1.5)
MODE (OHS): AC
MONOCYTES # BLD AUTO: 0.99 X10(3)/MCL (ref 0.1–1.3)
MONOCYTES NFR BLD AUTO: 6.9 %
NEUTROPHILS # BLD AUTO: 11.61 X10(3)/MCL (ref 2.1–9.2)
NEUTROPHILS NFR BLD AUTO: 81.1 %
NRBC BLD AUTO-RTO: 0 %
O2 HB BLOOD GAS (OHS): 96.2 % (ref 94–97)
OSMOLALITY SERPL: 316 MOSM/KG (ref 280–300)
OSMOLALITY SERPL: 317 MOSM/KG (ref 280–300)
OSMOLALITY SERPL: 320 MOSM/KG (ref 280–300)
OSMOLALITY SERPL: 321 MOSM/KG (ref 280–300)
OXYGEN DEVICE BLOOD GAS (OHS): ABNORMAL
OXYHGB MFR BLDA: 12.6 G/DL (ref 12–16)
PCO2 BLDA: 39 MMHG (ref 35–45)
PEEP RESPIRATORY: 5 CMH2O
PH BLDA: 7.46 [PH] (ref 7.35–7.45)
PHOSPHATE SERPL-MCNC: 2.9 MG/DL (ref 2.3–4.7)
PLATELET # BLD AUTO: 135 X10(3)/MCL (ref 130–400)
PLATELETS.RETICULATED NFR BLD AUTO: 3.9 % (ref 0.9–11.2)
PMV BLD AUTO: 10.3 FL (ref 7.4–10.4)
PO2 BLDA: 101 MMHG (ref 80–100)
POCT GLUCOSE: 112 MG/DL (ref 70–110)
POCT GLUCOSE: 112 MG/DL (ref 70–110)
POCT GLUCOSE: 131 MG/DL (ref 70–110)
POTASSIUM BLOOD GAS (OHS): 3.5 MMOL/L (ref 3.5–5)
POTASSIUM SERPL-SCNC: 3.9 MMOL/L (ref 3.5–5.1)
PROT SERPL-MCNC: 5.7 GM/DL (ref 5.8–7.6)
RBC # BLD AUTO: 4.06 X10(6)/MCL (ref 4.7–6.1)
SAMPLE SITE BLOOD GAS (OHS): ABNORMAL
SAO2 % BLDA: 98.1 %
SODIUM BLOOD GAS (OHS): 145 MMOL/L (ref 137–145)
SODIUM SERPL-SCNC: 148 MMOL/L (ref 136–145)
SODIUM SERPL-SCNC: 150 MMOL/L (ref 136–145)
SODIUM SERPL-SCNC: 151 MMOL/L (ref 136–145)
SODIUM SERPL-SCNC: 152 MMOL/L (ref 136–145)
SPONT+MECH VT ON VENT: 475 ML
UNIT NUMBER: NORMAL
UNIT NUMBER: NORMAL
WBC # SPEC AUTO: 14.32 X10(3)/MCL (ref 4.5–11.5)

## 2023-12-24 PROCEDURE — 63600175 PHARM REV CODE 636 W HCPCS

## 2023-12-24 PROCEDURE — 84295 ASSAY OF SERUM SODIUM: CPT | Performed by: NURSE PRACTITIONER

## 2023-12-24 PROCEDURE — 25000003 PHARM REV CODE 250: Performed by: STUDENT IN AN ORGANIZED HEALTH CARE EDUCATION/TRAINING PROGRAM

## 2023-12-24 PROCEDURE — 84100 ASSAY OF PHOSPHORUS: CPT

## 2023-12-24 PROCEDURE — 25000003 PHARM REV CODE 250: Performed by: INTERNAL MEDICINE

## 2023-12-24 PROCEDURE — 85025 COMPLETE CBC W/AUTO DIFF WBC: CPT

## 2023-12-24 PROCEDURE — 63600175 PHARM REV CODE 636 W HCPCS: Performed by: INTERNAL MEDICINE

## 2023-12-24 PROCEDURE — 27100171 HC OXYGEN HIGH FLOW UP TO 24 HOURS

## 2023-12-24 PROCEDURE — 27200966 HC CLOSED SUCTION SYSTEM

## 2023-12-24 PROCEDURE — 25000003 PHARM REV CODE 250

## 2023-12-24 PROCEDURE — 94003 VENT MGMT INPAT SUBQ DAY: CPT

## 2023-12-24 PROCEDURE — 83930 ASSAY OF BLOOD OSMOLALITY: CPT | Performed by: NURSE PRACTITIONER

## 2023-12-24 PROCEDURE — 99900035 HC TECH TIME PER 15 MIN (STAT)

## 2023-12-24 PROCEDURE — 94761 N-INVAS EAR/PLS OXIMETRY MLT: CPT

## 2023-12-24 PROCEDURE — 99900026 HC AIRWAY MAINTENANCE (STAT)

## 2023-12-24 PROCEDURE — 83735 ASSAY OF MAGNESIUM: CPT

## 2023-12-24 PROCEDURE — 20000000 HC ICU ROOM

## 2023-12-24 PROCEDURE — 99900031 HC PATIENT EDUCATION (STAT)

## 2023-12-24 PROCEDURE — 80053 COMPREHEN METABOLIC PANEL: CPT

## 2023-12-24 PROCEDURE — 63600175 PHARM REV CODE 636 W HCPCS: Performed by: STUDENT IN AN ORGANIZED HEALTH CARE EDUCATION/TRAINING PROGRAM

## 2023-12-24 PROCEDURE — 25000003 PHARM REV CODE 250: Performed by: NURSE PRACTITIONER

## 2023-12-24 RX ORDER — FENTANYL CITRATE-0.9 % NACL/PF 10 MCG/ML
0-250 PLASTIC BAG, INJECTION (ML) INTRAVENOUS CONTINUOUS
Status: CANCELLED | OUTPATIENT
Start: 2023-12-24

## 2023-12-24 RX ORDER — HYDRALAZINE HYDROCHLORIDE 20 MG/ML
10 INJECTION INTRAMUSCULAR; INTRAVENOUS
Status: DISCONTINUED | OUTPATIENT
Start: 2023-12-24 | End: 2024-01-16 | Stop reason: HOSPADM

## 2023-12-24 RX ORDER — FAMOTIDINE 10 MG/ML
20 INJECTION INTRAVENOUS DAILY
Status: DISCONTINUED | OUTPATIENT
Start: 2023-12-24 | End: 2024-01-16 | Stop reason: HOSPADM

## 2023-12-24 RX ORDER — FENTANYL CITRATE-0.9 % NACL/PF 10 MCG/ML
0-250 PLASTIC BAG, INJECTION (ML) INTRAVENOUS CONTINUOUS
Status: DISCONTINUED | OUTPATIENT
Start: 2023-12-24 | End: 2024-01-16 | Stop reason: HOSPADM

## 2023-12-24 RX ORDER — DILTIAZEM HYDROCHLORIDE 60 MG/1
240 TABLET, FILM COATED ORAL DAILY
Status: DISCONTINUED | OUTPATIENT
Start: 2023-12-24 | End: 2024-01-05

## 2023-12-24 RX ORDER — LOSARTAN POTASSIUM 50 MG/1
50 TABLET ORAL DAILY
Status: DISCONTINUED | OUTPATIENT
Start: 2023-12-24 | End: 2024-01-05

## 2023-12-24 RX ORDER — LABETALOL HYDROCHLORIDE 5 MG/ML
10 INJECTION, SOLUTION INTRAVENOUS
Status: DISCONTINUED | OUTPATIENT
Start: 2023-12-24 | End: 2024-01-16 | Stop reason: HOSPADM

## 2023-12-24 RX ORDER — METOPROLOL TARTRATE 50 MG/1
100 TABLET ORAL 2 TIMES DAILY
Status: DISCONTINUED | OUTPATIENT
Start: 2023-12-24 | End: 2023-12-31

## 2023-12-24 RX ADMIN — Medication 50 MCG/HR: at 09:12

## 2023-12-24 RX ADMIN — HYDRALAZINE HYDROCHLORIDE 10 MG: 20 INJECTION INTRAMUSCULAR; INTRAVENOUS at 12:12

## 2023-12-24 RX ADMIN — DILTIAZEM HYDROCHLORIDE 240 MG: 60 TABLET, FILM COATED ORAL at 11:12

## 2023-12-24 RX ADMIN — DEXMEDETOMIDINE HYDROCHLORIDE 0.8 MCG/KG/HR: 4 INJECTION INTRAVENOUS at 04:12

## 2023-12-24 RX ADMIN — FENTANYL CITRATE 50 MCG: 50 INJECTION, SOLUTION INTRAMUSCULAR; INTRAVENOUS at 01:12

## 2023-12-24 RX ADMIN — FENTANYL CITRATE 50 MCG: 50 INJECTION, SOLUTION INTRAMUSCULAR; INTRAVENOUS at 09:12

## 2023-12-24 RX ADMIN — HYDRALAZINE HYDROCHLORIDE 10 MG: 20 INJECTION INTRAMUSCULAR; INTRAVENOUS at 07:12

## 2023-12-24 RX ADMIN — LOSARTAN POTASSIUM 50 MG: 50 TABLET, FILM COATED ORAL at 11:12

## 2023-12-24 RX ADMIN — LEVETIRACETAM INJECTION 1000 MG: 10 INJECTION INTRAVENOUS at 09:12

## 2023-12-24 RX ADMIN — VANCOMYCIN HYDROCHLORIDE 1500 MG: 1.5 INJECTION, POWDER, LYOPHILIZED, FOR SOLUTION INTRAVENOUS at 05:12

## 2023-12-24 RX ADMIN — DEXMEDETOMIDINE HYDROCHLORIDE 0.04 MCG/KG/HR: 4 INJECTION INTRAVENOUS at 10:12

## 2023-12-24 RX ADMIN — DEXMEDETOMIDINE HYDROCHLORIDE 0.4 MCG/KG/HR: 4 INJECTION INTRAVENOUS at 12:12

## 2023-12-24 RX ADMIN — FAMOTIDINE 20 MG: 10 INJECTION, SOLUTION INTRAVENOUS at 09:12

## 2023-12-24 RX ADMIN — FENTANYL CITRATE 50 MCG: 50 INJECTION, SOLUTION INTRAMUSCULAR; INTRAVENOUS at 07:12

## 2023-12-24 RX ADMIN — METOPROLOL TARTRATE 100 MG: 50 TABLET, FILM COATED ORAL at 11:12

## 2023-12-24 RX ADMIN — ACETAMINOPHEN 650 MG: 650 SOLUTION ORAL at 04:12

## 2023-12-24 RX ADMIN — ASPIRIN 81 MG CHEWABLE TABLET 81 MG: 81 TABLET CHEWABLE at 09:12

## 2023-12-24 RX ADMIN — MUPIROCIN: 20 OINTMENT TOPICAL at 09:12

## 2023-12-24 RX ADMIN — CEFEPIME 1 G: 1 INJECTION, POWDER, FOR SOLUTION INTRAMUSCULAR; INTRAVENOUS at 05:12

## 2023-12-24 RX ADMIN — CEFEPIME 1 G: 1 INJECTION, POWDER, FOR SOLUTION INTRAMUSCULAR; INTRAVENOUS at 10:12

## 2023-12-24 RX ADMIN — CEFEPIME 1 G: 1 INJECTION, POWDER, FOR SOLUTION INTRAMUSCULAR; INTRAVENOUS at 03:12

## 2023-12-24 RX ADMIN — LABETALOL HYDROCHLORIDE 10 MG: 5 INJECTION, SOLUTION INTRAVENOUS at 12:12

## 2023-12-24 NOTE — PROGRESS NOTES
POD#4 right craniectomy for CVA due to thrombosis of right middle cerebral artery     Intubated mechanically ventilated   Precedex but was given fentanyl earlier due to violent coughing episodes.  Current sodium is 151     Vital signs are stable.  Pinpoint pupils possibly sluggishly reactive Withdraws right upper and lower extremity.  Right arm up towards ET tube during suctioning.  Nursing reports seeing left upper extremity possibly withdrawing as well.    +cough, corneal and gag    Incision now open to air.    KRISHNA was removed and suture placed.      Flap is full but not tense.        Plan:     Incision open to air.  Clean and dry staples intact.  KRISHNA was discontinued.  Suture placed.  No further drainage.  Bone flap precautions-consult orthotics for helmet at bedside.    Hourly neurological exams-minimize sedation if possible.  Continue BP parameters per neurology  Keppra BID, seizure precautions  SCDs for DVT prophylaxis; hold anticoagulation for now    Post-Op Info:  Procedure(s) (LRB):  CRANIECTOMY (Right)   4 Days Post-Op      Medications:  Continuous Infusions:   sodium chloride 0.9% Stopped (12/23/23 0927)    dexmedeTOMIDine (Precedex) infusion (titrating) 0.4 mcg/kg/hr (12/24/23 0600)    dilTIAZem Stopped (12/23/23 1609)    NORepinephrine bitartrate-D5W Stopped (12/20/23 1929)    propofoL Stopped (12/23/23 1045)     Scheduled Meds:   aspirin  81 mg Oral Daily    ceFEPime (MAXIPIME) IVPB  1 g Intravenous Q8H    diltiaZEM  10 mg Intravenous Once    famotidine (PF)  20 mg Intravenous Daily    levETIRAcetam (Keppra) IV (PEDS and ADULTS)  1,000 mg Intravenous Q12H    mannitol 20%  75 g Intravenous Once    vancomycin (VANCOCIN) IV (PEDS and ADULTS)  1,500 mg Intravenous Q12H     PRN Meds:0.9%  NaCl infusion (for blood administration), acetaminophen, dextrose 10%, dextrose 10%, fentaNYL, glucagon (human recombinant), hydrALAZINE **AND** labetalol, insulin aspart U-100, metoprolol, ondansetron, sodium chloride 0.9%,  Pharmacy to dose Vancomycin consult **AND** vancomycin - pharmacy to dose     Review of Systems  Objective:     Weight: 111.1 kg (244 lb 14.9 oz)  Body mass index is 34.18 kg/m².  Vital Signs (Most Recent):  Temp: 99.8 °F (37.7 °C) (12/24/23 0525)  Pulse: 89 (12/24/23 0616)  Resp: 20 (12/24/23 0616)  BP: 126/85 (12/24/23 0616)  SpO2: 100 % (12/24/23 0616) Vital Signs (24h Range):  Temp:  [99.3 °F (37.4 °C)-100.9 °F (38.3 °C)] 99.8 °F (37.7 °C)  Pulse:  [] 89  Resp:  [18-24] 20  SpO2:  [97 %-100 %] 100 %  BP: (116-162)/() 126/85                  Vent Mode: A/C  Oxygen Concentration (%):  [40] 40  Resp Rate Total:  [20 br/min] 20 br/min  Vt Set:  [475 mL] 475 mL  PEEP/CPAP:  [5 cmH20] 5 cmH20  Mean Airway Pressure:  [8.8 cmH20-10 cmH20] 9 cmH20             Closed/Suction Drain Right Scalp (Active)   Site Description Unable to view 12/23/23 0800   Dressing Type Gauze 12/23/23 0800   Dressing Status Clean;Dry;Intact 12/23/23 0800   Dressing Intervention Integrity maintained 12/23/23 0800   Drainage Serosanguineous 12/23/23 0800   Status To bulb suction 12/23/23 0800   Output (mL) 15 mL 12/22/23 1841            NG/OG Tube 12/20/23 0930 16 Fr. Center mouth (Active)   Placement Check placement verified by aspirate characteristics 12/23/23 0800   Distal Tube Length (cm) 75 12/23/23 0800   Tolerance no signs/symptoms of discomfort 12/23/23 0800   Securement secured to commercial device 12/23/23 0800   Clamp Status/Tolerance unclamped 12/23/23 0800   Suction Setting/Drainage Method suction at;low;intermittent setting 12/23/23 0800   Insertion Site Appearance no redness, warmth, tenderness, skin breakdown, drainage 12/23/23 0800   Drainage Green;Bile;Brown 12/23/23 0800   Flush/Irrigation flushed w/;water;no resistance met 12/23/23 0800   Tube Output(mL)(Include Discarded Residual) 300 mL 12/22/23 0653            Urethral Catheter 12/20/23 1007 (Active)   $ Fernandez Insertion Bedside Insertion Performed 12/20/23 0921  "  Site Assessment Clean;Intact;Dry 12/23/23 0800   Collection Container Standard drainage bag 12/23/23 0800   Securement Method secured to top of thigh w/ adhesive device 12/23/23 0800   Catheter Care Performed yes 12/23/23 0800   Reason for Continuing Urinary Catheterization Critically ill in ICU and requiring hourly monitoring of intake/output 12/23/23 0800   CAUTI Prevention Bundle Securement Device in place with 1" slack;Intact seal between catheter & drainage tubing;Drainage bag/urimeter off the floor;No dependent loops or kinks;Sheeting clip in use;Drainage bag/urimeter below bladder;Drainage bag/urimeter not overfilled (<2/3 full) 12/23/23 0800   Output (mL) 125 mL 12/23/23 0800       Neurosurgery Physical Exam    Significant Labs:  Recent Labs   Lab 12/23/23 0036 12/23/23  0618 12/23/23  1831 12/23/23  2345 12/24/23  0605   *   < > 150* 152* 151*   K 3.5  --   --   --  3.9   CO2 25  --   --   --  23   BUN 12.5  --   --   --  16.3   CREATININE 1.04  --   --   --  0.87   CALCIUM 8.3*  --   --   --  7.7*   MG 2.30  --   --   --  2.20    < > = values in this interval not displayed.       Recent Labs   Lab 12/23/23 0036 12/24/23  0605   WBC 15.44* 14.32*   HGB 12.6* 12.3*   HCT 40.4* 38.0*    135       No results for input(s): "LABPT", "INR", "APTT" in the last 48 hours.  Microbiology Results (last 7 days)       Procedure Component Value Units Date/Time    Tissue Culture - Aerobic [[id number]] Collected: 12/20/23 1752    Order Status: Completed Specimen: Bone from Skull Updated: 12/24/23 0727     CULTURE, TISSUE- AEROBIC ([facility]) No growth at 4 days    Urine culture [[id number]] Collected: 12/23/23 0523    Order Status: Completed Specimen: Urine Updated: 12/24/23 0629     Urine Culture No Growth At 24 Hours    Blood Culture [[id number]] Collected: 12/23/23 1831    Order Status: Resulted Specimen: Blood from IV Site Updated: 12/23/23 1856    Blood Culture [[id number]] Collected: 12/23/23 1831    Order " Status: Resulted Specimen: Blood from Central Line Updated: 12/23/23 1855    Anaerobic Culture [[id number]] Collected: 12/20/23 1752    Order Status: Completed Specimen: Bone from Skull Updated: 12/23/23 0754     Anaerobe Culture No Anaerobes Isolated            Active Diagnoses:    Diagnosis Date Noted POA    PRINCIPAL PROBLEM:  Stroke [I63.9] 12/19/2023 Yes      Problems Resolved During this Admission:       BLAIR Bey-BC  Neurosurgery  [facility]

## 2023-12-24 NOTE — NURSING
Nurses Note -- 4 Eyes      12/24/2023   5:28 PM      Skin assessed during: Daily Assessment      [] No Altered Skin Integrity Present    [x]Prevention Measures Documented      [x] Yes- Altered Skin Integrity Present or Discovered   [] LDA Added if Not in Epic (Describe Wound)   [] New Altered Skin Integrity was Present on Admit and Documented in LDA   [] Wound Image Taken    Wound Care Consulted? No    Attending Nurse:  MÓNICA Steele    Second RN/Staff Member:   MÓNICA TRIPLETT

## 2023-12-24 NOTE — PROGRESS NOTES
[facility] - [facility]  Pulmonary Critical Care Note    Patient Name: Delio Daniel Jr.  MRN: [id number]  Admission Date: 12/19/2023  Hospital Length of Stay: 5 days  Code Status: Full Code  Attending Provider: KODI Drake MD  Primary Care Provider: Candy, Primary Doctor     Subjective:     HPI:   Delio Daniel Jr. is a 68 y.o. 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 His last known normal was 9:15 per EMS. Stroke protocol in ED initiated. Unofficial CT head showed possible dense R MCA. Pt taken to cath lab for BRAD thrombectomy. Admitted to ICU for post-operative care and monitoring.     Hospital Course/Significant events:  12/19/2023 - Admitted to ICU s/p right internal carotid artery thrombectomy  12/20/2023 - s/p craniectomy    24 Hour Interval History:  Multiple febrile episodes overnight. Current drips: precedex at 0.4. I/O: 1.1 L urine past 24 hours, 360 mL drain output, net positive 2.1 L. Will start vancomycin and cefepime this morning due to intermittent fever with leukocytosis; low suspicion for anaerobic pathogen involvement at this time.      Past Medical History:   Diagnosis Date    Arthritis     Atrial fibrillation     BPH (benign prostatic hyperplasia)     Cardiac arrest     Coronary artery disease     Cyst, kidney, acquired     Diverticulosis     Hyperlipidemia     Hypertension     MI (myocardial infarction)     Obesity     Steatosis of liver        Past Surgical History:   Procedure Laterality Date    A-V CARDIAC PACEMAKER INSERTION Right     CARDIAC CATHETERIZATION      COLONOSCOPY W/ BIOPSIES      CRANIECTOMY Right  12/20/2023    Procedure: CRANIECTOMY;  Surgeon: Artem Can MD;  Location: [facility] OR;  Service: Neurosurgery;  Laterality: Right;    excision of colon         Social History     Socioeconomic History    Marital status:     Number of children: 9   Occupational History    Occupation: retired   Tobacco Use    Smoking status: Former    Smokeless tobacco: Never   Substance and Sexual Activity    Alcohol use: Not Currently    Drug use: Not Currently    Sexual activity: Not Currently     Partners: Female     Social Determinants of Health     Financial Resource Strain: Low Risk  (10/19/2022)    Overall Financial Resource Strain (CARDIA)     Difficulty of Paying Living Expenses: Not hard at all   Food Insecurity: No Food Insecurity (10/19/2022)    Hunger Vital Sign     Worried About Running Out of Food in the Last Year: Never true     Ran Out of Food in the Last Year: Never true   Transportation Needs: No Transportation Needs (10/19/2022)    PRAPARE - Transportation     Lack of Transportation (Medical): No     Lack of Transportation (Non-Medical): No   Physical Activity: Sufficiently Active (10/19/2022)    Exercise Vital Sign     Days of Exercise per Week: 6 days     Minutes of Exercise per Session: 60 min   Stress: No Stress Concern Present (10/19/2022)    [employer] - Occupational Stress Questionnaire     Feeling of Stress : Not at all   Social Connections: Unknown (10/19/2022)    Social Connection and Isolation Panel [NHANES]     Frequency of Communication with Friends and Family: More than three times a week     Frequency of Social Gatherings with Friends and Family: More than three times a week     Attends [employer] Services: More than 4 times per year     Active Member of Clubs or Organizations: No     Attends Club or Organization Meetings: Never   Housing Stability: Low Risk  (10/19/2022)    Housing Stability Vital Sign     Unable to Pay for Housing in the Last Year: No      Number of Places Lived in the Last Year: 1     Unstable Housing in the Last Year: No       Current Outpatient Medications   Medication Instructions    albuterol (PROVENTIL/VENTOLIN HFA) 90 mcg/actuation inhaler 2 puffs, Inhalation, Every 6 hours PRN, Rescue    aspirin 81 MG Chew chew and swallow 1 tablet by mouth daily    atorvastatin (LIPITOR) 40 mg, Oral, Daily    cetirizine (ZYRTEC) 10 mg, Oral, Daily    diltiaZEM (CARDIZEM CD) 360 mg, Oral, Daily    losartan (COZAAR) 50 mg, Oral, Daily    metoprolol succinate (TOPROL-XL) 200 mg, Oral, 2 times daily    omeprazole (PRILOSEC) 20 mg, Oral, Daily, One tab by mouth daily    potassium chloride (KLOR-CON) 20 mEq Pack 20 mEq, Oral, Daily    spironolactone (ALDACTONE) 50 mg, Oral, Daily    torsemide (DEMADEX) 10 mg, Oral, Daily    vitamin D (VITAMIN D3) 1,000 Units, Oral, Daily    XARELTO 20 mg Tab TAKE 1 TABLET BY MOUTH DAILY with SUPPER     Current Inpatient Medications   aspirin  81 mg Oral Daily    diltiaZEM  10 mg Intravenous Once    levETIRAcetam (Keppra) IV (PEDS and ADULTS)  1,000 mg Intravenous Q12H    mannitol 20%  75 g Intravenous Once    mupirocin   Nasal BID     Current Intravenous Infusions   sodium chloride 0.9% Stopped (12/23/23 0927)    dexmedeTOMIDine (Precedex) infusion (titrating) 0.4 mcg/kg/hr (12/24/23 0200)    dilTIAZem Stopped (12/23/23 1609)    NORepinephrine bitartrate-D5W Stopped (12/20/23 1929)    propofoL Stopped (12/23/23 1045)       Objective:     Intake/Output Summary (Last 24 hours) at 12/24/2023 0458  Last data filed at 12/24/2023 0200  Gross per 24 hour   Intake 2503.06 ml   Output 1890 ml   Net 613.06 ml       Vital Signs (Most Recent):  Temp: (!) 100.9 °F (38.3 °C) (12/24/23 0439)  Pulse: 94 (12/24/23 0345)  Resp: 20 (12/24/23 0345)  BP: (!) 156/109 (12/24/23 0330)  SpO2: 100 % (12/24/23 0345)  Body mass index is 33.84 kg/m².  Weight: 110 kg (242 lb 8.1 oz) Vital Signs (24h Range):  Temp:  [99.3 °F (37.4 °C)-100.9 °F (38.3 °C)] 100.9  °F (38.3 °C)  Pulse:  [] 94  Resp:  [18-24] 20  SpO2:  [96 %-100 %] 100 %  BP: (103-162)/() 156/109     Physical Exam  General: Intubated  HEENT: Head dressing and drain intact; pinpoint pupils, sluggish; nasal and oral mucosa moist and clear, ET tube in place  Pulm: CTA bilaterally, mechanically ventilated  CV: Irregular w/o murmurs or gallops; non-pitting edema in upper extremities, 1+ edema in BLE noted  GI: Bowel sound present in all quadrants, abdomen soft to palpation  MSK: No obvious deformities  Neuro: Limited d/t sedation    Lines/Drains/Airways       Central Venous Catheter Line  Duration             Percutaneous Central Line Insertion/Assessment - Triple Lumen  12/20/23 1056 Internal Jugular Right 3 days              Drain  Duration                  Closed/Suction Drain Right Scalp -- days         NG/OG Tube 12/20/23 0930 16 Fr. Center mouth 3 days         Urethral Catheter 12/20/23 1007 3 days              Airway  Duration                  Airway - Non-Surgical 12/20/23 0930 Endotracheal Tube 3 days              Peripheral Intravenous Line  Duration                  Peripheral IV - Single Lumen 12/19/23 1045 20 G Lateral;Left Breast 4 days         Peripheral IV - Single Lumen 12/19/23 1053 20 G Right Antecubital 4 days                  Significant Labs:  Lab Results   Component Value Date    WBC 15.44 (H) 12/23/2023    HGB 12.6 (L) 12/23/2023    HCT 40.4 (L) 12/23/2023    MCV 95.1 (H) 12/23/2023     12/23/2023       BMP  Lab Results   Component Value Date     (H) 12/23/2023    K 3.5 12/23/2023    CHLORIDE 119 (H) 12/23/2023    CO2 25 12/23/2023    BUN 12.5 12/23/2023    CREATININE 1.04 12/23/2023    CALCIUM 8.3 (L) 12/23/2023    AGAP 8.0 12/20/2023    EGFRNONAA 61 04/23/2022     ABG  Recent Labs   Lab 12/23/23  0613   PH 7.430   PO2 93.0   PCO2 43.0   HCO3 28.5*       Mechanical Ventilation Support:  Vent Mode: A/C (12/24/23 0055)  Set Rate: 20 BPM (12/24/23 0055)  Vt Set: 475 mL  (12/24/23 0055)  PEEP/CPAP: 5 cmH20 (12/24/23 0055)  Oxygen Concentration (%): 40 (12/23/23 2000)  Peak Airway Pressure: 21 cmH20 (12/24/23 0055)  Total Ve: 7.8 L/m (12/24/23 0055)  F/VT Ratio<105 (RSBI): (!) 44.84 (12/23/23 1615)    Significant Imaging:  I have reviewed the pertinent imaging within the past 24 hours.    Assessment/Plan:     Assessment  CVA s/p right ICA and MCA M3  branch thrombectomy s/p craniectomy with hemorrhagic conversion  Leukocytosis  Acute hypoxic respiratory failure requiring intubation  Atrial fibrillation w/ RVR  Onychomycosis  HX of CAD, HTN, HLD, ADA    Plan  -Continue ICU level of care for ongoing monitoring and medical management  -Continue to wean sedation and mechanical ventilation as tolerated  -Cardiology, neurology, neurosurgery teams following, appreciate their input  -Will start vanc & cefepime due to persistent febrile episodes with leukocytosis; blood cultures 12/23/2023 pending    DVT Prophylaxis: SCD (No anticoagulation for 14 days due to petechial hemorrhage in brain)  GI Prophylaxis: Famotidine     32 minutes of critical care was time spent personally by me on the following activities: development of treatment plan with patient or surrogate and bedside caregivers, discussions with consultants, evaluation of patient's response to treatment, examination of patient, ordering and performing treatments and interventions, ordering and review of laboratory studies, ordering and review of radiographic studies, pulse oximetry, re-evaluation of patient's condition.  This critical care time did not overlap with that of any other provider or involve time for any procedures.     Kenny Richter DO, PGY-II  Pulmonary Critical Care Medicine  [facility] - [facility]

## 2023-12-24 NOTE — PROGRESS NOTES
Consults  [facility] - [facility]    Cardiology  Progress Note    Patient Name: Delio Daniel Jr.  MRN: [id number]  Admission Date: 12/19/2023  Hospital Length of Stay: 5 days  Code Status: Full Code   Attending Provider: KODI Drake MD   Consulting Provider: TRUMAN Sumner  Primary Care Physician: Candy, Primary Doctor  Principal Problem:Stroke    Patient information was obtained from past medical records, ER records, and primary team.     Subjective:   Consultation Reason: AF RVR    HPI:   Mr. Daniel is a 68 year old male, known to [facility] at [facility] (underwent cath by Dr. Marrero in 2018), who presented to the hospital on 12.19.23 with left facial droop, slurred speech, left sided hemiparesis, and fixed right sided gaze. Stroke Protocol was initiated. CT Head revealed possible dense right MCA Territory stroke. He was taken to cath lab where he underwent BRAD Thrombectomy. He experienced hemorrhagic conversion requiring craniectomy. Also required intubation/mechanical ventilation. He does have atrial fibrillation and was started on Cardizem infusion for acute HR Control. [facility] consulted for AF Management.    12.23.23: Intubated and Mechanically ventilated. Currently on Propofol with transition to Precedex.   12.23.23: Intubated and mechanically ventilated. Intermittent Fever overnight. Currently on Precedex.  Cardizem infusion discontinued.  Currently rate controlled    PMH: Persistent AF (Xarelto), Hypertension, CAD (STEMI 2003), Heart Failure with Preserved EF (50-55%), Second Degree AV Block S/P ICD, VF due to Prolonged QT Interval, Hypokalemia, BPH, Fatty Liver Disease, Hyperlipidemia, MI, Obesity, Steatosis of Liver, Cardiac Arrest, Arthritis  PSH: LHC, Colonoscopy, Device Placement (Dual Chamber ICD)  Family History: Mother- Hypertension, Father- Hypertension, Sister- Hypertension  Social History: Tobacco- Former Smoker, Alcohol- Negative, Substance Abuse- Negative     Previous Cardiac  Diagnostics:   Carotid US (12.19.23):  The right internal carotid artery demonstrated less than 50% stenosis.  The left internal carotid artery demonstrated less than 50% stenosis.  The bilateral vertebral arteries were patent with antegrade flow.  Bilateral internal carotid arteries demonstrated decreased velocities starting from the common carotid arteries.     Echocardiogram (12.19.23):  Left Ventricle: There is moderate concentric hypertrophy. Normal wall motion. There is low normal systolic function with a visually estimated ejection fraction of 50 - 55%. Unable to assess diastolic function due to atrial fibrillation. Elevated left ventricular filling pressure.  Right Ventricle: Normal right ventricular cavity size. Systolic function is mildly reduced.  Left Atrium: Left atrium is mildly dilated. Agitated saline study of the atrial septum is negative, suggesting absence of intracardiac shunt at the atrial level.  Right Atrium: Right atrium is dilated.  Mitral Valve: There is mild to moderate regurgitation with an anteromedial eccentrically directed jet.  Negative bubble study.    CV US Venous Doppler (9.28.23):  There is no evidence of a right lower extremity DVT.  There is no evidence of a left lower extremity DVT.  The contralateral (left) common femoral vein is patent.  There is a right subcutaneous edema located in the proximal thigh, middle thigh, distal thigh, proximal calf and distal calf veins.  The contralateral (right) common femoral vein is patent.  There is a left subcutaneous edema located in the proximal thigh, middle thigh, distal thigh, proximal calf and distal calf veins.  There was no deep vein thrombosis identified in the visualized veins of the bilateral lower extremities.      Echocardiogram (8.15.23):  Technically difficult study.  Optison contrast used.  Rhythm is atrial fibrillation.  Left Ventricle: There is normal systolic function with a visually estimated ejection fraction of 55 -  60%. Unable to assess due to atrial fibrillation.  Left Atrium: Left atrium is dilated.  Right Ventricle: Normal right ventricular cavity size.  Aortic Valve: The aortic valve is a trileaflet valve.  Mitral Valve: There is mild regurgitation with an anteromedial eccentrically directed jet.    Left Heart Catheterization (3.8.18):  Normal Coronary Angiogram.  Normal LVEDP with No Aortic Stenosis.  EF 60% with No Segmental Wall Motion Abnormalities.  No MR.     Review of patient's allergies indicates:  No Known Allergies    No current facility-administered medications on file prior to encounter.     Current Outpatient Medications on File Prior to Encounter   Medication Sig    aspirin 81 MG Chew chew and swallow 1 tablet by mouth daily    atorvastatin (LIPITOR) 40 MG tablet Take 1 tablet (40 mg total) by mouth once daily.    diltiaZEM (CARDIZEM CD) 360 MG 24 hr capsule Take 1 capsule (360 mg total) by mouth once daily.    losartan (COZAAR) 50 MG tablet Take 1 tablet (50 mg total) by mouth once daily.    metoprolol succinate (TOPROL-XL) 200 MG 24 hr tablet Take 1 tablet (200 mg total) by mouth 2 (two) times daily.    omeprazole (PRILOSEC) 20 MG capsule Take 1 capsule (20 mg total) by mouth once daily. One tab by mouth daily    potassium chloride (KLOR-CON) 20 mEq Pack Take 20 mEq by mouth once daily.    spironolactone (ALDACTONE) 50 MG tablet Take 1 tablet (50 mg total) by mouth once daily.    torsemide (DEMADEX) 10 MG Tab Take 1 tablet (10 mg total) by mouth once daily.    vitamin D (VITAMIN D3) 1000 units Tab Take 1 tablet (1,000 Units total) by mouth once daily.    XARELTO 20 mg Tab TAKE 1 TABLET BY MOUTH DAILY with SUPPER    albuterol (PROVENTIL/VENTOLIN HFA) 90 mcg/actuation inhaler Inhale 2 puffs into the lungs every 6 (six) hours as needed for Wheezing. Rescue (Patient not taking: Reported on 8/22/2023)    cetirizine (ZYRTEC) 10 MG tablet Take 1 tablet (10 mg total) by mouth once daily. for 14 days     Review of  Systems   Unable to perform ROS: Intubated   Respiratory:          Intubated and mechanically ventilated      Objective:     Vital Signs (Most Recent):  Temp: 99.8 °F (37.7 °C) (12/24/23 0525)  Pulse: 89 (12/24/23 0616)  Resp: 20 (12/24/23 0616)  BP: 126/85 (12/24/23 0616)  SpO2: 100 % (12/24/23 0616) Vital Signs (24h Range):  Temp:  [99.3 °F (37.4 °C)-100.9 °F (38.3 °C)] 99.8 °F (37.7 °C)  Pulse:  [] 89  Resp:  [18-24] 20  SpO2:  [96 %-100 %] 100 %  BP: (103-162)/() 126/85     Weight: 111.1 kg (244 lb 14.9 oz)  Body mass index is 34.18 kg/m².    SpO2: 100 %         Intake/Output Summary (Last 24 hours) at 12/24/2023 0959  Last data filed at 12/24/2023 0600  Gross per 24 hour   Intake 2642.15 ml   Output 1995 ml   Net 647.15 ml         Lines/Drains/Airways       Central Venous Catheter Line  Duration             Percutaneous Central Line Insertion/Assessment - Triple Lumen  12/20/23 1056 Internal Jugular Right 3 days              Drain  Duration                  Closed/Suction Drain Right Scalp -- days         NG/OG Tube 12/20/23 0930 16 Fr. Center mouth 4 days         Urethral Catheter 12/20/23 1007 3 days              Airway  Duration                  Airway - Non-Surgical 12/20/23 0930 Endotracheal Tube 4 days              Peripheral Intravenous Line  Duration                  Peripheral IV - Single Lumen 12/19/23 1045 20 G Lateral;Left Breast 4 days         Peripheral IV - Single Lumen 12/19/23 1053 20 G Right Antecubital 4 days                  Significant Labs:  Recent Results (from the past 72 hour(s))   POCT glucose    Collection Time: 12/21/23  2:01 PM   Result Value Ref Range    POCT Glucose 163 (H) 70 - 110 mg/dL   Sodium    Collection Time: 12/21/23  5:31 PM   Result Value Ref Range    Sodium Level 147 (H) 136 - 145 mmol/L   Osmolality, Serum    Collection Time: 12/21/23  5:31 PM   Result Value Ref Range    Osmolality 305 (H) 280 - 300 mOsm/kg   Osmolality, Serum    Collection Time: 12/22/23  12:18 AM   Result Value Ref Range    Osmolality 313 (H) 280 - 300 mOsm/kg   Phosphorus    Collection Time: 12/22/23 12:18 AM   Result Value Ref Range    Phosphorus Level 1.9 (L) 2.3 - 4.7 mg/dL   Magnesium    Collection Time: 12/22/23 12:18 AM   Result Value Ref Range    Magnesium Level 2.20 1.60 - 2.60 mg/dL   Comprehensive Metabolic Panel    Collection Time: 12/22/23 12:18 AM   Result Value Ref Range    Sodium Level 150 (H) 136 - 145 mmol/L    Potassium Level 3.6 3.5 - 5.1 mmol/L    Chloride 118 (H) 98 - 107 mmol/L    Carbon Dioxide 25 23 - 31 mmol/L    Glucose Level 147 (H) 82 - 115 mg/dL    Blood Urea Nitrogen 10.2 8.4 - 25.7 mg/dL    Creatinine 0.97 0.73 - 1.18 mg/dL    Calcium Level Total 8.0 (L) 8.8 - 10.0 mg/dL    Protein Total 6.1 5.8 - 7.6 gm/dL    Albumin Level 3.2 (L) 3.4 - 4.8 g/dL    Globulin 2.9 2.4 - 3.5 gm/dL    Albumin/Globulin Ratio 1.1 1.1 - 2.0 ratio    Bilirubin Total 1.4 <=1.5 mg/dL    Alkaline Phosphatase 56 40 - 150 unit/L    Alanine Aminotransferase 28 0 - 55 unit/L    Aspartate Aminotransferase 38 (H) 5 - 34 unit/L    eGFR >60 mls/min/1.73/m2   CBC with Differential    Collection Time: 12/22/23 12:18 AM   Result Value Ref Range    WBC 18.52 (H) 4.50 - 11.50 x10(3)/mcL    RBC 4.30 (L) 4.70 - 6.10 x10(6)/mcL    Hgb 12.8 (L) 14.0 - 18.0 g/dL    Hct 40.0 (L) 42.0 - 52.0 %    MCV 93.0 80.0 - 94.0 fL    MCH 29.8 27.0 - 31.0 pg    MCHC 32.0 (L) 33.0 - 36.0 g/dL    RDW 15.7 11.5 - 17.0 %    Platelet 156 130 - 400 x10(3)/mcL    MPV 10.1 7.4 - 10.4 fL    Neut % 82.6 %    Lymph % 6.5 %    Mono % 8.4 %    Eos % 1.6 %    Basophil % 0.3 %    Lymph # 1.20 0.6 - 4.6 x10(3)/mcL    Neut # 15.28 (H) 2.1 - 9.2 x10(3)/mcL    Mono # 1.56 (H) 0.1 - 1.3 x10(3)/mcL    Eos # 0.30 0 - 0.9 x10(3)/mcL    Baso # 0.06 <=0.2 x10(3)/mcL    IG# 0.12 (H) 0 - 0.04 x10(3)/mcL    IG% 0.6 %    NRBC% 0.0 %   Sodium    Collection Time: 12/22/23  5:38 AM   Result Value Ref Range    Sodium Level 163 (HH) 136 - 145 mmol/L    Osmolality, Serum    Collection Time: 12/22/23  5:38 AM   Result Value Ref Range    Osmolality 329 (HH) 280 - 300 mOsm/kg   Sodium    Collection Time: 12/22/23 12:47 PM   Result Value Ref Range    Sodium Level 150 (H) 136 - 145 mmol/L   Osmolality, Serum    Collection Time: 12/22/23 12:47 PM   Result Value Ref Range    Osmolality 314 (H) 280 - 300 mOsm/kg   Sodium    Collection Time: 12/22/23  6:11 PM   Result Value Ref Range    Sodium Level 152 (H) 136 - 145 mmol/L   Osmolality, Serum    Collection Time: 12/22/23  6:11 PM   Result Value Ref Range    Osmolality 314 (H) 280 - 300 mOsm/kg   POCT glucose    Collection Time: 12/22/23  6:36 PM   Result Value Ref Range    POCT Glucose 100 70 - 110 mg/dL   Osmolality, Serum    Collection Time: 12/22/23 11:57 PM   Result Value Ref Range    Osmolality 315 (H) 280 - 300 mOsm/kg   Phosphorus    Collection Time: 12/23/23 12:36 AM   Result Value Ref Range    Phosphorus Level 1.6 (L) 2.3 - 4.7 mg/dL   Magnesium    Collection Time: 12/23/23 12:36 AM   Result Value Ref Range    Magnesium Level 2.30 1.60 - 2.60 mg/dL   Comprehensive Metabolic Panel    Collection Time: 12/23/23 12:36 AM   Result Value Ref Range    Sodium Level 153 (H) 136 - 145 mmol/L    Potassium Level 3.5 3.5 - 5.1 mmol/L    Chloride 119 (H) 98 - 107 mmol/L    Carbon Dioxide 25 23 - 31 mmol/L    Glucose Level 114 82 - 115 mg/dL    Blood Urea Nitrogen 12.5 8.4 - 25.7 mg/dL    Creatinine 1.04 0.73 - 1.18 mg/dL    Calcium Level Total 8.3 (L) 8.8 - 10.0 mg/dL    Protein Total 6.3 5.8 - 7.6 gm/dL    Albumin Level 2.8 (L) 3.4 - 4.8 g/dL    Globulin 3.5 2.4 - 3.5 gm/dL    Albumin/Globulin Ratio 0.8 (L) 1.1 - 2.0 ratio    Bilirubin Total 1.6 (H) <=1.5 mg/dL    Alkaline Phosphatase 58 40 - 150 unit/L    Alanine Aminotransferase 33 0 - 55 unit/L    Aspartate Aminotransferase 36 (H) 5 - 34 unit/L    eGFR >60 mls/min/1.73/m2   CBC with Differential    Collection Time: 12/23/23 12:36 AM   Result Value Ref Range    WBC 15.44  (H) 4.50 - 11.50 x10(3)/mcL    RBC 4.25 (L) 4.70 - 6.10 x10(6)/mcL    Hgb 12.6 (L) 14.0 - 18.0 g/dL    Hct 40.4 (L) 42.0 - 52.0 %    MCV 95.1 (H) 80.0 - 94.0 fL    MCH 29.6 27.0 - 31.0 pg    MCHC 31.2 (L) 33.0 - 36.0 g/dL    RDW 15.8 11.5 - 17.0 %    Platelet 138 130 - 400 x10(3)/mcL    MPV 10.2 7.4 - 10.4 fL    Neut % 79.3 %    Lymph % 8.6 %    Mono % 9.1 %    Eos % 1.9 %    Basophil % 0.3 %    Lymph # 1.33 0.6 - 4.6 x10(3)/mcL    Neut # 12.25 (H) 2.1 - 9.2 x10(3)/mcL    Mono # 1.40 (H) 0.1 - 1.3 x10(3)/mcL    Eos # 0.29 0 - 0.9 x10(3)/mcL    Baso # 0.04 <=0.2 x10(3)/mcL    IG# 0.13 (H) 0 - 0.04 x10(3)/mcL    IG% 0.8 %    NRBC% 0.0 %   Urinalysis, Reflex to Urine Culture    Collection Time: 12/23/23  5:23 AM    Specimen: Urine   Result Value Ref Range    Color, UA Light-Orange (A) Yellow, Light-Yellow, Colorless, Straw, Dark-Yellow    Appearance, UA Turbid (A) Clear    Specific Gravity, UA 1.040 (H) 1.005 - 1.030    pH, UA 6.0 5.0 - 8.5    Protein, UA 2+ (A) Negative    Glucose, UA Normal Negative, Normal    Ketones, UA Negative Negative    Blood, UA 3+ (A) Negative    Bilirubin, UA Negative Negative    Urobilinogen, UA 2.0 (A) 0.2, 1.0, Normal    Nitrites, UA Negative Negative    Leukocyte Esterase,  (A) Negative    WBC, UA 11-20 (A) None Seen, 0-2, 3-5, 0-5 /HPF    Bacteria, UA Trace None Seen, Trace /HPF    Squamous Epithelial Cells, UA Trace None Seen /HPF    Renal Epithelial Cells, UA Few (A) None Seen /HPF    Mucous, UA Trace (A) None Seen /LPF    Amorphous Crystal, UA Occasional /uL    RBC, UA 21-50 (A) None Seen, 0-2, 3-5, 0-5 /HPF   Urine culture    Collection Time: 12/23/23  5:23 AM    Specimen: Urine   Result Value Ref Range    Urine Culture No Growth At 24 Hours    RT Blood Gas    Collection Time: 12/23/23  6:13 AM   Result Value Ref Range    Sample Type Arterial Blood     Sample site Right Radial Artery     Drawn by swcrt     pH, Blood gas 7.430 7.350 - 7.450    pCO2, Blood gas 43.0 35.0 - 45.0  mmHg    pO2, Blood gas 93.0 80.0 - 100.0 mmHg    Sodium, Blood Gas 148 (H) 137 - 145 mmol/L    Potassium, Blood Gas 3.4 (L) 3.5 - 5.0 mmol/L    Calcium Level Ionized 1.15 1.12 - 1.23 mmol/L    TOC2, Blood gas 29.8 mmol/L    Base Excess, Blood gas 3.70 mmol/L    sO2, Blood gas 97.0 %    HCO3, Blood gas 28.5 (H) 22.0 - 26.0 mmol/L    MODE AC     Oxygen Device, Blood gas Ventilator     FIO2, Blood gas 40 %    Mech Vt 475 ml    Mech RR 20 b/min    PEEP 5.0 cmH2O   Osmolality, Serum    Collection Time: 12/23/23  6:17 AM   Result Value Ref Range    Osmolality 318 (H) 280 - 300 mOsm/kg   Sodium    Collection Time: 12/23/23  6:18 AM   Result Value Ref Range    Sodium Level 151 (H) 136 - 145 mmol/L   Sodium    Collection Time: 12/23/23  1:52 PM   Result Value Ref Range    Sodium Level 151 (H) 136 - 145 mmol/L   Osmolality, Serum    Collection Time: 12/23/23  1:52 PM   Result Value Ref Range    Osmolality 314 (H) 280 - 300 mOsm/kg   POCT glucose    Collection Time: 12/23/23  5:32 PM   Result Value Ref Range    POCT Glucose 109 70 - 110 mg/dL   Sodium    Collection Time: 12/23/23  6:31 PM   Result Value Ref Range    Sodium Level 150 (H) 136 - 145 mmol/L   Osmolality, Serum    Collection Time: 12/23/23  6:31 PM   Result Value Ref Range    Osmolality 317 (H) 280 - 300 mOsm/kg   Sodium    Collection Time: 12/23/23 11:45 PM   Result Value Ref Range    Sodium Level 152 (H) 136 - 145 mmol/L   Osmolality, Serum    Collection Time: 12/23/23 11:45 PM   Result Value Ref Range    Osmolality 321 (HH) 280 - 300 mOsm/kg   POCT glucose    Collection Time: 12/24/23 12:12 AM   Result Value Ref Range    POCT Glucose 112 (H) 70 - 110 mg/dL   RT Blood Gas    Collection Time: 12/24/23  5:00 AM   Result Value Ref Range    Sample Type Arterial Blood     Sample site Right Radial Artery     Drawn by swcrt     pH, Blood gas 7.460 (H) 7.350 - 7.450    pCO2, Blood gas 39.0 35.0 - 45.0 mmHg    pO2, Blood gas 101.0 (H) 80.0 - 100.0 mmHg    Sodium, Blood  Gas 145 137 - 145 mmol/L    Potassium, Blood Gas 3.5 3.5 - 5.0 mmol/L    Calcium Level Ionized 1.12 1.12 - 1.23 mmol/L    TOC2, Blood gas 28.9 mmol/L    Base Excess, Blood gas 3.70 (H) -2.00 - 2.00 mmol/L    sO2, Blood gas 98.1 %    HCO3, Blood gas 27.7 (H) 22.0 - 26.0 mmol/L    THb, Blood gas 12.6 12 - 16 g/dL    O2 Hb, Blood Gas 96.2 94.0 - 97.0 %    CO Hgb 1.1 0.5 - 1.5 %    Met Hgb 0.8 0.4 - 1.5 %    Allens Test Yes     MODE AC     Oxygen Device, Blood gas Ventilator     FIO2, Blood gas 40 %    Mech Vt 475 ml    Mech RR 20 b/min    PEEP 5.0 cmH2O   POCT glucose    Collection Time: 12/24/23  5:24 AM   Result Value Ref Range    POCT Glucose 131 (H) 70 - 110 mg/dL   Comprehensive Metabolic Panel    Collection Time: 12/24/23  6:05 AM   Result Value Ref Range    Sodium Level 151 (H) 136 - 145 mmol/L    Potassium Level 3.9 3.5 - 5.1 mmol/L    Chloride 116 (H) 98 - 107 mmol/L    Carbon Dioxide 23 23 - 31 mmol/L    Glucose Level 148 (H) 82 - 115 mg/dL    Blood Urea Nitrogen 16.3 8.4 - 25.7 mg/dL    Creatinine 0.87 0.73 - 1.18 mg/dL    Calcium Level Total 7.7 (L) 8.8 - 10.0 mg/dL    Protein Total 5.7 (L) 5.8 - 7.6 gm/dL    Albumin Level 2.5 (L) 3.4 - 4.8 g/dL    Globulin 3.2 2.4 - 3.5 gm/dL    Albumin/Globulin Ratio 0.8 (L) 1.1 - 2.0 ratio    Bilirubin Total 1.7 (H) <=1.5 mg/dL    Alkaline Phosphatase 55 40 - 150 unit/L    Alanine Aminotransferase 38 0 - 55 unit/L    Aspartate Aminotransferase 38 (H) 5 - 34 unit/L    eGFR >60 mls/min/1.73/m2   Magnesium    Collection Time: 12/24/23  6:05 AM   Result Value Ref Range    Magnesium Level 2.20 1.60 - 2.60 mg/dL   Phosphorus    Collection Time: 12/24/23  6:05 AM   Result Value Ref Range    Phosphorus Level 2.9 2.3 - 4.7 mg/dL   Osmolality, Serum    Collection Time: 12/24/23  6:05 AM   Result Value Ref Range    Osmolality 317 (H) 280 - 300 mOsm/kg   CBC with Differential    Collection Time: 12/24/23  6:05 AM   Result Value Ref Range    WBC 14.32 (H) 4.50 - 11.50 x10(3)/mcL     RBC 4.06 (L) 4.70 - 6.10 x10(6)/mcL    Hgb 12.3 (L) 14.0 - 18.0 g/dL    Hct 38.0 (L) 42.0 - 52.0 %    MCV 93.6 80.0 - 94.0 fL    MCH 30.3 27.0 - 31.0 pg    MCHC 32.4 (L) 33.0 - 36.0 g/dL    RDW 15.2 11.5 - 17.0 %    Platelet 135 130 - 400 x10(3)/mcL    MPV 10.3 7.4 - 10.4 fL    Neut % 81.1 %    Lymph % 9.0 %    Mono % 6.9 %    Eos % 1.6 %    Basophil % 0.4 %    Lymph # 1.29 0.6 - 4.6 x10(3)/mcL    Neut # 11.61 (H) 2.1 - 9.2 x10(3)/mcL    Mono # 0.99 0.1 - 1.3 x10(3)/mcL    Eos # 0.23 0 - 0.9 x10(3)/mcL    Baso # 0.06 <=0.2 x10(3)/mcL    IG# 0.14 (H) 0 - 0.04 x10(3)/mcL    IG% 1.0 %    NRBC% 0.0 %    IPF 3.9 0.9 - 11.2 %     Significant Imaging:  Imaging Results              IR Thrombectomy Intracranial Inc all Imaging (Final result)  Result time 12/19/23 15:11:53      Final result by Tani Calderon MD (12/19/23 15:11:53)                   Impression:      Fluoroscopic assistance provided as above.      Electronically signed by: Tani Calderon  Date:    12/19/2023  Time:    15:11               Narrative:    EXAMINATION:  IR THROMBECTOMY INTRACRANIAL INC ALL IMAGING    CLINICAL HISTORY:  Cerebral infarction, unspecifiedstroke;    FINDINGS:  Fluoroscopic assistance provided during vascular procedure.  Images were independently interpreted by the attending physician performing the procedure.    Fluoro time 9.7 minutes.    Reference Air Kerma: 917 mGy.                                       CTA STROKE MULTI-PHASE (Final result)  Result time 12/19/23 12:59:46      Final result by Laureen Christina MD (12/19/23 12:59:46)                   Impression:      Irregular appearance of the right internal carotid artery near the skull base and petrous portion.  This is of uncertain etiology.  This could be related to soft atheromatous plaque, ill-defined dissection flap or artifact.    Poor enhancement of the right anterior circulation including the MCA and HÉCTOR on arterial phase.  There is delayed enhancement of the right HÉCTOR  and MCA seen on delayed phase postcontrast imaging suggesting upstream/inflow abnormality.    Very subtle asymmetric loss of gray-white differentiation in the right cerebral hemisphere most pronounced at the frontal lobe and basal ganglia.  No appreciable hemorrhage.    Findings discussed with clinician caring for patient Dr. Randle 12/19/2023 12:39.      Electronically signed by: Laureen Christina  Date:    12/19/2023  Time:    12:59               Narrative:    EXAMINATION:  CTA STROKE MULTI-PHASE    CLINICAL HISTORY:  Neuro deficit, acute, stroke suspected;    TECHNIQUE:  CT angiogram was performed from the level of the jackie to the vertex prior to and following the IV administration of intravenous contrast.  Axial, sagittal and coronal reconstructions and maximum intensity projection reconstructions were performed. Arterial stenosis percentages are based on NASCET measurement criteria.    Automated tube current modulation, weight-based exposure dosing, and/or iterative reconstruction technique utilized to reach lowest reasonably achievable exposure rate.    DLP: 2045 mGycm    COMPARISON:  CT head 12/19/2023 at 10:57 hours    FINDINGS:  CTA NECK:    AORTIC ARCH AND GREAT VESSELS: 2 vessel left aortic arch.    RIGHT ICA: There is atherosclerotic plaque at the carotid bulb and proximal internal carotid artery with less than 50% stenosis.  There is irregular contour and inhomogeneous enhancement of the cervical carotid artery at the skull base and at the petrous carotid artery (for example series 1, image 289).    LEFT ICA: Mild atherosclerotic plaque at the carotid bulb without hemodynamically significant stenosis.    VERTEBRAL ARTERIES: Diminutive vertebral arteries without stenosis.    CTA HEAD:    ANTERIOR CIRCULATION: On the arterial phase imaging there is asymmetric hypo perfusion and irregular appearance of the cervical carotid artery on the right.  There is poor enhancement at the HÉCTOR and M1 segment.   Contrast fades distally towards the M2 segment.  There is gross asymmetric hypoenhancement of the vasculature at the sylvian fissure when comparing right to left on the arterial phase.  On a slightly delayed phase obtained approximately 30-40 seconds later there is enhancement at the right anterior circulation which is now more symmetric compared to the left suggesting potential delayed in flow phenomenon or perfusion via the [id number].  The left anterior circulation enhances normally without significant stenosis.    POSTERIOR CIRCULATION: No hemodynamically significant stenosis, aneurysmal dilatation, or dissection.    NON-VASCULAR STRUCTURES (LIMITED EVALUATION): There is very subtle loss of gray-white differentiation in the region of the insular cortex and right frontal lobe and slight blurring of delineation of the basal ganglia on the right.  No appreciable hemorrhage.    Poor dentition.  Dental caries and periapical lucency.                                       CT HEAD FOR STROKE (Final result)  Result time 12/19/23 11:33:25   Procedure changed from CT Head Without Contrast     Final result by Tani Calderon MD (12/19/23 11:33:25)                   Impression:      No acute intracranial findings or significant interval change compared to May 2023.      Electronically signed by: Tani Calderon  Date:    12/19/2023  Time:    11:33               Narrative:    EXAMINATION:  CT HEAD FOR STROKE    CLINICAL HISTORY:  Neuro deficit, acute, stroke suspected;    TECHNIQUE:  CT imaging of the head performed from the skull base to the vertex without intravenous contrast.  mGycm. Automatic exposure control, adjustment of mA/kV or iterative reconstruction technique was used to reduce radiation.    COMPARISON:  23 May 2023    FINDINGS:  There is no acute cortical infarct, hemorrhage or mass lesion.  No new parenchymal attenuation abnormality.  Ventricular size is stable.  There are vascular  calcifications.    Visualized paranasal sinuses and mastoid air cells are clear.                                    EKG:        Telemetry:  AF    Physical Exam  Vitals and nursing note reviewed.   Constitutional:       General: He is not in acute distress.     Appearance: He is ill-appearing.   HENT:      Head:      Comments: Cerebral Dressing in Place Post Craniectomy.  Neck:      Comments: RIJ Venous Cath  Cardiovascular:      Rate and Rhythm: Tachycardia present. Rhythm irregular.      Heart sounds: Normal heart sounds.      Comments: AF RVR  Pulmonary:      Effort: No respiratory distress.      Comments: Intubation/Mechanical Ventilation FIO2 40%  Abdominal:      Palpations: Abdomen is soft.      Comments: OG Tube   Genitourinary:     Comments: Fernandez Catheter  Musculoskeletal:      Comments: Legs are Warm   Skin:     General: Skin is warm and dry.   Neurological:      Comments: Awakes off of sedation       Home Medications:   No current facility-administered medications on file prior to encounter.     Current Outpatient Medications on File Prior to Encounter   Medication Sig Dispense Refill    aspirin 81 MG Chew chew and swallow 1 tablet by mouth daily 90 tablet 3    atorvastatin (LIPITOR) 40 MG tablet Take 1 tablet (40 mg total) by mouth once daily. 90 tablet 1    diltiaZEM (CARDIZEM CD) 360 MG 24 hr capsule Take 1 capsule (360 mg total) by mouth once daily. 90 capsule 1    losartan (COZAAR) 50 MG tablet Take 1 tablet (50 mg total) by mouth once daily. 90 tablet 1    metoprolol succinate (TOPROL-XL) 200 MG 24 hr tablet Take 1 tablet (200 mg total) by mouth 2 (two) times daily. 180 tablet 1    omeprazole (PRILOSEC) 20 MG capsule Take 1 capsule (20 mg total) by mouth once daily. One tab by mouth daily 30 capsule 2    potassium chloride (KLOR-CON) 20 mEq Pack Take 20 mEq by mouth once daily. 30 packet 5    spironolactone (ALDACTONE) 50 MG tablet Take 1 tablet (50 mg total) by mouth once daily. 90 tablet 1     torsemide (DEMADEX) 10 MG Tab Take 1 tablet (10 mg total) by mouth once daily. 30 tablet 0    vitamin D (VITAMIN D3) 1000 units Tab Take 1 tablet (1,000 Units total) by mouth once daily. 30 tablet 1    XARELTO 20 mg Tab TAKE 1 TABLET BY MOUTH DAILY with SUPPER 90 tablet 3    albuterol (PROVENTIL/VENTOLIN HFA) 90 mcg/actuation inhaler Inhale 2 puffs into the lungs every 6 (six) hours as needed for Wheezing. Rescue (Patient not taking: Reported on 8/22/2023) 8.5 g 0    cetirizine (ZYRTEC) 10 MG tablet Take 1 tablet (10 mg total) by mouth once daily. for 14 days 14 tablet 0     Current Inpatient Medications:    Current Facility-Administered Medications:     0.9%  NaCl infusion (for blood administration), , Intravenous, Q24H PRN, Fidel Kraus AGACNP-BC    0.9%  NaCl infusion, , Intravenous, Continuous, Ijeoma Hernandez NP, Stopped at 12/23/23 0927    acetaminophen oral solution 650 mg, 650 mg, Oral, Q4H PRN, Kenny Richter DO, 650 mg at 12/24/23 0439    aspirin chewable tablet 81 mg, 81 mg, Oral, Daily, Maria Victoria Rosales NP, 81 mg at 12/24/23 0909    ceFEPIme (MAXIPIME) 1 g in dextrose 5 % in water (D5W) 100 mL IVPB (MB+), 1 g, Intravenous, Q8H, Kenny Richter DO, Stopped at 12/24/23 0608    dexmedetomidine (PRECEDEX) 400mcg/100mL 0.9% NaCL infusion, 0-1.4 mcg/kg/hr, Intravenous, Continuous, Italo Orozco MD, Last Rate: 11 mL/hr at 12/24/23 0600, 0.4 mcg/kg/hr at 12/24/23 0600    dextrose 10% bolus 125 mL 125 mL, 12.5 g, Intravenous, PRN, Eleazar Westbrook MD    dextrose 10% bolus 250 mL 250 mL, 25 g, Intravenous, PRN, Eleazar Westbrook MD    diltiaZEM 125 mg in dextrose 5 % 125 mL IVPB (ready to mix) (titrating), 0-15 mg/hr, Intravenous, Continuous, Leopoldo Mullins, TRUMAN, Stopped at 12/23/23 1609    diltiaZEM injection 10 mg, 10 mg, Intravenous, Once, Leopoldo Mullins, TRUMAN    famotidine (PF) injection 20 mg, 20 mg, Intravenous, Daily, Kenny Richter DO, 20 mg at 12/24/23 0923     fentaNYL injection 50 mcg, 50 mcg, Intravenous, Q1H PRN, Eleazar Westbrook MD, 50 mcg at 12/22/23 1015    glucagon (human recombinant) injection 1 mg, 1 mg, Intramuscular, PRN, Eleazar Westbrook MD    hydrALAZINE injection 10 mg, 10 mg, Intravenous, Q4H PRN, 10 mg at 12/24/23 0055 **AND** labetaloL injection 10 mg, 10 mg, Intravenous, Q4H PRN, Gasper Barlow MD, 10 mg at 12/24/23 0022    insulin aspart U-100 injection 0-5 Units, 0-5 Units, Subcutaneous, Q6H PRN, Eleazar Westbrook MD, 2 Units at 12/20/23 0624    levETIRAcetam in NaCl (iso-os) IVPB 1,000 mg, 1,000 mg, Intravenous, Q12H, Italo Orozco MD, Last Rate: 200 mL/hr at 12/24/23 0910, 1,000 mg at 12/24/23 0910    mannitol 20% infusion 75 g, 75 g, Intravenous, Once, Fidel Kraus, AGACNP-BC    metoprolol injection 5 mg, 5 mg, Intravenous, Q5 Min PRN, Patrick Gipson DO, 5 mg at 12/22/23 0904    NORepinephrine 8 mg in dextrose 5% 250 mL infusion, 0-3 mcg/kg/min, Intravenous, Continuous, Italo Orozco MD, Stopped at 12/20/23 1929    ondansetron injection 8 mg, 8 mg, Intravenous, Q6H PRN, Patrick Gipson DO, 8 mg at 12/20/23 0045    propofol (DIPRIVAN) 10 mg/mL infusion, 0-50 mcg/kg/min, Intravenous, Continuous, Gasper Barlow MD, Stopped at 12/23/23 1045    sodium chloride 0.9% flush 10 mL, 10 mL, Intravenous, PRN, Ijeoma Hernandez, NP    Pharmacy to dose Vancomycin consult, , , Once **AND** vancomycin - pharmacy to dose, , Intravenous, pharmacy to manage frequency, Ng, Kenny Garrido, DO    vancomycin 1.5 g in dextrose 5 % 250 mL IVPB (ready to mix), 1,500 mg, Intravenous, Q12H, KODI Drake MD, Stopped at 12/24/23 0721    VTE Risk Mitigation (From admission, onward)           Ordered     Reason for No Pharmacological VTE Prophylaxis  Once        Question:  Reasons:  Answer:  Risk of Bleeding    12/19/23 1551     IP VTE HIGH RISK PATIENT  Once         12/19/23 1551     Place sequential compression device  Until  discontinued         12/19/23 1551                  Assessment:   Persistent Atrial Fibrillation with Intermittent RVR- now rate controlled     - [id number]: 4    - Anticoagulation on Hold in Setting of Cerebral Bleed Post Stroke  Acute Ischemic CVA (Right ICA & M3 Branch) with Hemorrhagic Conversion Requiring Craniectomy    - Status Post Thrombectomy  Acute Hypoxemic Respiratory Failure Requiring Intubation/Mechanical Ventilation  History of VF Arrest due to Prolonged QT Interval/Hypokalemia    - Status Post Dual Chamber ICD     - Normal Coronaries on 3.8.18    - History of MI in 2003 (No Angiographic Evidence of CAD in 2018)  Leukocytosis  - Intermittent fever overnight   Hypertension- Above Gal Requiring Cleviprex Infusion  Hyperlipidemia  Valvular Heart Disease    - MR: Mild to Moderate  Elevated TSH  Obstructive Sleep Apnea    Plan:   IV Cardizem discontinued. Patient remains rate controlled.   Lopressor IV PRN HR > 120 Sustained.  Not on Anticoagulation given current clinic condition/Cerebral Bleed/Hemorrhagic Transformation. Will look for clearance by Neurological Team before considering long-term anticoagulation therapy.  Vent Management as per ICU Team  Antibiotic per primary team   Continue Supportive Care as per Nursing Team.      TRUMAN Sumner  Cardiology  [facility] - [address] [place] [address]  12/24/2023 8:10 AM     I have seen the patient, reviewed the Nurse Practitioner's note, assessment and plan. I have personally interviewed and examined the patient at bedside and agree with the findings. Medical decision making listed above were done under my guidance.

## 2023-12-24 NOTE — PROGRESS NOTES
Pharmacokinetic Initial Assessment: IV Vancomycin    Assessment/Plan:    Initiate intravenous vancomycin with loading dose of 1500 mg once followed by a maintenance dose of vancomycin 1500 mg IV every 12 hours  Desired empiric serum trough concentration is 15 to 20 mcg/mL  Draw vancomycin trough level 60 min prior to fourth dose on 12/25 at approximately 1700  Pharmacy will continue to follow and monitor vancomycin.      Please contact pharmacy at extension [phone] with any questions regarding this assessment.     Thank you for the consult,   Buddy Delaney       Patient brief summary:  Delio Daniel Jr. is a 68 y.o. male initiated on antimicrobial therapy with IV Vancomycin for treatment of suspected bacteremia    Drug Allergies:   Review of patient's allergies indicates:  No Known Allergies    Actual Body Weight:   110kg    Renal Function:   Estimated Creatinine Clearance: 87 mL/min (based on SCr of 1.04 mg/dL).,     Dialysis Method (if applicable):  N/A    CBC (last 72 hours):  Recent Labs   Lab Result Units 12/22/23  0018 12/23/23  0036   WBC x10(3)/mcL 18.52* 15.44*   Hgb g/dL 12.8* 12.6*   Hct % 40.0* 40.4*   Platelet x10(3)/mcL 156 138   Mono % % 8.4 9.1   Eos % % 1.6 1.9   Basophil % % 0.3 0.3       Metabolic Panel (last 72 hours):  Recent Labs   Lab Result Units 12/21/23  0737 12/21/23  1731 12/22/23  0018 12/22/23  0538 12/22/23  1247 12/22/23  1811 12/23/23  0036 12/23/23  0523 12/23/23  0618 12/23/23  1352 12/23/23  1831 12/23/23  2345   Sodium Level mmol/L 147* 147* 150* 163* 150* 152* 153*  --  151* 151* 150* 152*   Potassium Level mmol/L  --   --  3.6  --   --   --  3.5  --   --   --   --   --    Chloride mmol/L  --   --  118*  --   --   --  119*  --   --   --   --   --    Carbon Dioxide mmol/L  --   --  25  --   --   --  25  --   --   --   --   --    Glucose Level mg/dL  --   --  147*  --   --   --  114  --   --   --   --   --    Glucose, UA   --   --   --   --   --   --   --  Normal  --   --   --   --   "  Blood Urea Nitrogen mg/dL  --   --  10.2  --   --   --  12.5  --   --   --   --   --    Creatinine mg/dL  --   --  0.97  --   --   --  1.04  --   --   --   --   --    Albumin Level g/dL  --   --  3.2*  --   --   --  2.8*  --   --   --   --   --    Bilirubin Total mg/dL  --   --  1.4  --   --   --  1.6*  --   --   --   --   --    Alkaline Phosphatase unit/L  --   --  56  --   --   --  58  --   --   --   --   --    Aspartate Aminotransferase unit/L  --   --  38*  --   --   --  36*  --   --   --   --   --    Alanine Aminotransferase unit/L  --   --  28  --   --   --  33  --   --   --   --   --    Magnesium Level mg/dL  --   --  2.20  --   --   --  2.30  --   --   --   --   --    Phosphorus Level mg/dL  --   --  1.9*  --   --   --  1.6*  --   --   --   --   --        Drug levels (last 3 results):  No results for input(s): "VANCOMYCINRA", "VANCORANDOM", "VANCOMYCINPE", "VANCOPEAK", "VANCOMYCINTR", "VANCOTROUGH" in the last 72 hours.    Microbiologic Results:  Microbiology Results (last 7 days)       Procedure Component Value Units Date/Time    Blood Culture [[id number]] Collected: 12/23/23 1831    Order Status: Resulted Specimen: Blood from IV Site Updated: 12/23/23 1856    Blood Culture [[id number]] Collected: 12/23/23 1831    Order Status: Resulted Specimen: Blood from Central Line Updated: 12/23/23 1855    Tissue Culture - Aerobic [[id number]] Collected: 12/20/23 1752    Order Status: Completed Specimen: Bone from Skull Updated: 12/23/23 0811     CULTURE, TISSUE- AEROBIC ([facility]) No Growth At 72 Hours    Anaerobic Culture [[id number]] Collected: 12/20/23 1752    Order Status: Completed Specimen: Bone from Skull Updated: 12/23/23 0754     Anaerobe Culture No Anaerobes Isolated    Urine culture [[id number]] Collected: 12/23/23 0523    Order Status: Sent Specimen: Urine Updated: 12/23/23 0700            "

## 2023-12-25 LAB
ALBUMIN SERPL-MCNC: 2.5 G/DL (ref 3.4–4.8)
ALBUMIN/GLOB SERPL: 0.7 RATIO (ref 1.1–2)
ALP SERPL-CCNC: 72 UNIT/L (ref 40–150)
ALT SERPL-CCNC: 46 UNIT/L (ref 0–55)
AST SERPL-CCNC: 55 UNIT/L (ref 5–34)
BACTERIA TISS AEROBE CULT: NORMAL
BACTERIA UR CULT: NORMAL
BASOPHILS # BLD AUTO: 0.09 X10(3)/MCL
BASOPHILS NFR BLD AUTO: 0.6 %
BILIRUB SERPL-MCNC: 1.6 MG/DL
BUN SERPL-MCNC: 19.9 MG/DL (ref 8.4–25.7)
CALCIUM SERPL-MCNC: 8.5 MG/DL (ref 8.8–10)
CHLORIDE SERPL-SCNC: 117 MMOL/L (ref 98–107)
CO2 SERPL-SCNC: 22 MMOL/L (ref 23–31)
CREAT SERPL-MCNC: 0.87 MG/DL (ref 0.73–1.18)
EOSINOPHIL # BLD AUTO: 0.34 X10(3)/MCL (ref 0–0.9)
EOSINOPHIL NFR BLD AUTO: 2.2 %
ERYTHROCYTE [DISTWIDTH] IN BLOOD BY AUTOMATED COUNT: 15.2 % (ref 11.5–17)
GFR SERPLBLD CREATININE-BSD FMLA CKD-EPI: >60 MLS/MIN/1.73/M2
GLOBULIN SER-MCNC: 3.8 GM/DL (ref 2.4–3.5)
GLUCOSE SERPL-MCNC: 108 MG/DL (ref 82–115)
HCT VFR BLD AUTO: 39.9 % (ref 42–52)
HGB BLD-MCNC: 12 G/DL (ref 14–18)
IMM GRANULOCYTES # BLD AUTO: 0.2 X10(3)/MCL (ref 0–0.04)
IMM GRANULOCYTES NFR BLD AUTO: 1.3 %
LYMPHOCYTES # BLD AUTO: 1.13 X10(3)/MCL (ref 0.6–4.6)
LYMPHOCYTES NFR BLD AUTO: 7.4 %
MAGNESIUM SERPL-MCNC: 2.4 MG/DL (ref 1.6–2.6)
MCH RBC QN AUTO: 29 PG (ref 27–31)
MCHC RBC AUTO-ENTMCNC: 30.1 G/DL (ref 33–36)
MCV RBC AUTO: 96.4 FL (ref 80–94)
MONOCYTES # BLD AUTO: 1.12 X10(3)/MCL (ref 0.1–1.3)
MONOCYTES NFR BLD AUTO: 7.3 %
NEUTROPHILS # BLD AUTO: 12.4 X10(3)/MCL (ref 2.1–9.2)
NEUTROPHILS NFR BLD AUTO: 81.2 %
NRBC BLD AUTO-RTO: 0 %
OSMOLALITY SERPL: 312 MOSM/KG (ref 280–300)
OSMOLALITY SERPL: 314 MOSM/KG (ref 280–300)
OSMOLALITY SERPL: 314 MOSM/KG (ref 280–300)
PHOSPHATE SERPL-MCNC: 2.7 MG/DL (ref 2.3–4.7)
PLATELET # BLD AUTO: 149 X10(3)/MCL (ref 130–400)
PMV BLD AUTO: 10.4 FL (ref 7.4–10.4)
POTASSIUM SERPL-SCNC: 3.7 MMOL/L (ref 3.5–5.1)
PROT SERPL-MCNC: 6.3 GM/DL (ref 5.8–7.6)
RBC # BLD AUTO: 4.14 X10(6)/MCL (ref 4.7–6.1)
SODIUM SERPL-SCNC: 147 MMOL/L (ref 136–145)
SODIUM SERPL-SCNC: 147 MMOL/L (ref 136–145)
SODIUM SERPL-SCNC: 148 MMOL/L (ref 136–145)
WBC # SPEC AUTO: 15.28 X10(3)/MCL (ref 4.5–11.5)

## 2023-12-25 PROCEDURE — 25000003 PHARM REV CODE 250: Performed by: NURSE PRACTITIONER

## 2023-12-25 PROCEDURE — 83735 ASSAY OF MAGNESIUM: CPT

## 2023-12-25 PROCEDURE — 83930 ASSAY OF BLOOD OSMOLALITY: CPT | Performed by: NEUROLOGICAL SURGERY

## 2023-12-25 PROCEDURE — 63600175 PHARM REV CODE 636 W HCPCS: Performed by: STUDENT IN AN ORGANIZED HEALTH CARE EDUCATION/TRAINING PROGRAM

## 2023-12-25 PROCEDURE — 99900035 HC TECH TIME PER 15 MIN (STAT)

## 2023-12-25 PROCEDURE — 83930 ASSAY OF BLOOD OSMOLALITY: CPT | Performed by: NURSE PRACTITIONER

## 2023-12-25 PROCEDURE — 84100 ASSAY OF PHOSPHORUS: CPT

## 2023-12-25 PROCEDURE — 25000003 PHARM REV CODE 250

## 2023-12-25 PROCEDURE — 85025 COMPLETE CBC W/AUTO DIFF WBC: CPT

## 2023-12-25 PROCEDURE — 84295 ASSAY OF SERUM SODIUM: CPT | Performed by: NURSE PRACTITIONER

## 2023-12-25 PROCEDURE — 27200966 HC CLOSED SUCTION SYSTEM

## 2023-12-25 PROCEDURE — 25000003 PHARM REV CODE 250: Performed by: INTERNAL MEDICINE

## 2023-12-25 PROCEDURE — 25000003 PHARM REV CODE 250: Performed by: STUDENT IN AN ORGANIZED HEALTH CARE EDUCATION/TRAINING PROGRAM

## 2023-12-25 PROCEDURE — 80053 COMPREHEN METABOLIC PANEL: CPT

## 2023-12-25 PROCEDURE — 94003 VENT MGMT INPAT SUBQ DAY: CPT

## 2023-12-25 PROCEDURE — 99900026 HC AIRWAY MAINTENANCE (STAT)

## 2023-12-25 PROCEDURE — 27100171 HC OXYGEN HIGH FLOW UP TO 24 HOURS

## 2023-12-25 PROCEDURE — 94761 N-INVAS EAR/PLS OXIMETRY MLT: CPT

## 2023-12-25 PROCEDURE — 63600175 PHARM REV CODE 636 W HCPCS: Performed by: INTERNAL MEDICINE

## 2023-12-25 PROCEDURE — 99024 POSTOP FOLLOW-UP VISIT: CPT | Mod: ,,, | Performed by: NEUROLOGICAL SURGERY

## 2023-12-25 PROCEDURE — 84295 ASSAY OF SERUM SODIUM: CPT | Performed by: NEUROLOGICAL SURGERY

## 2023-12-25 PROCEDURE — 20000000 HC ICU ROOM

## 2023-12-25 RX ADMIN — LEVETIRACETAM INJECTION 1000 MG: 10 INJECTION INTRAVENOUS at 08:12

## 2023-12-25 RX ADMIN — VANCOMYCIN HYDROCHLORIDE 1500 MG: 1.5 INJECTION, POWDER, LYOPHILIZED, FOR SOLUTION INTRAVENOUS at 05:12

## 2023-12-25 RX ADMIN — ASPIRIN 81 MG CHEWABLE TABLET 81 MG: 81 TABLET CHEWABLE at 08:12

## 2023-12-25 RX ADMIN — CEFEPIME 1 G: 1 INJECTION, POWDER, FOR SOLUTION INTRAMUSCULAR; INTRAVENOUS at 06:12

## 2023-12-25 RX ADMIN — CEFEPIME 1 G: 1 INJECTION, POWDER, FOR SOLUTION INTRAMUSCULAR; INTRAVENOUS at 10:12

## 2023-12-25 RX ADMIN — METOPROLOL TARTRATE 100 MG: 50 TABLET, FILM COATED ORAL at 08:12

## 2023-12-25 RX ADMIN — DILTIAZEM HYDROCHLORIDE 240 MG: 60 TABLET, FILM COATED ORAL at 08:12

## 2023-12-25 RX ADMIN — Medication 125 MCG/HR: at 05:12

## 2023-12-25 RX ADMIN — SODIUM CHLORIDE: 9 INJECTION, SOLUTION INTRAVENOUS at 07:12

## 2023-12-25 RX ADMIN — VANCOMYCIN HYDROCHLORIDE 1500 MG: 1.5 INJECTION, POWDER, LYOPHILIZED, FOR SOLUTION INTRAVENOUS at 06:12

## 2023-12-25 RX ADMIN — CEFEPIME 1 G: 1 INJECTION, POWDER, FOR SOLUTION INTRAMUSCULAR; INTRAVENOUS at 02:12

## 2023-12-25 RX ADMIN — LOSARTAN POTASSIUM 50 MG: 50 TABLET, FILM COATED ORAL at 08:12

## 2023-12-25 RX ADMIN — FAMOTIDINE 20 MG: 10 INJECTION, SOLUTION INTRAVENOUS at 08:12

## 2023-12-25 NOTE — PROGRESS NOTES
Consults  [facility] - [facility]    Cardiology  Progress Note    Patient Name: Delio Daniel Jr.  MRN: [id number]  Admission Date: 12/19/2023  Hospital Length of Stay: 6 days  Code Status: Full Code   Attending Provider: KODI Drake MD   Consulting Provider: TRUMAN Sumner  Primary Care Physician: Candy, Primary Doctor  Principal Problem:Stroke    Patient information was obtained from past medical records, ER records, and primary team.     Subjective:   Consultation Reason: AF RVR    HPI:   Mr. Daniel is a 68 year old male, known to [facility] at [facility] (underwent cath by Dr. Marrero in 2018), who presented to the hospital on 12.19.23 with left facial droop, slurred speech, left sided hemiparesis, and fixed right sided gaze. Stroke Protocol was initiated. CT Head revealed possible dense right MCA Territory stroke. He was taken to cath lab where he underwent BRAD Thrombectomy. He experienced hemorrhagic conversion requiring craniectomy. Also required intubation/mechanical ventilation. He does have atrial fibrillation and was started on Cardizem infusion for acute HR Control. [facility] consulted for AF Management.    12.23.23: Intubated and Mechanically ventilated. Currently on Propofol with transition to Precedex.   12.23.23: Intubated and mechanically ventilated. Intermittent Fever overnight. Currently on Precedex.  Cardizem infusion discontinued.  Currently rate controlled  12.23.23: Intubated and Mechanically ventilated. Vital signs stable. Currently rate controlled.     PMH: Persistent AF (Xarelto), Hypertension, CAD (STEMI 2003), Heart Failure with Preserved EF (50-55%), Second Degree AV Block S/P ICD, VF due to Prolonged QT Interval, Hypokalemia, BPH, Fatty Liver Disease, Hyperlipidemia, MI, Obesity, Steatosis of Liver, Cardiac Arrest, Arthritis  PSH: LHC, Colonoscopy, Device Placement (Dual Chamber ICD)  Family History: Mother- Hypertension, Father- Hypertension, Sister- Hypertension  Social History:  Tobacco- Former Smoker, Alcohol- Negative, Substance Abuse- Negative     Previous Cardiac Diagnostics:   Carotid US (12.19.23):  The right internal carotid artery demonstrated less than 50% stenosis.  The left internal carotid artery demonstrated less than 50% stenosis.  The bilateral vertebral arteries were patent with antegrade flow.  Bilateral internal carotid arteries demonstrated decreased velocities starting from the common carotid arteries.     Echocardiogram (12.19.23):  Left Ventricle: There is moderate concentric hypertrophy. Normal wall motion. There is low normal systolic function with a visually estimated ejection fraction of 50 - 55%. Unable to assess diastolic function due to atrial fibrillation. Elevated left ventricular filling pressure.  Right Ventricle: Normal right ventricular cavity size. Systolic function is mildly reduced.  Left Atrium: Left atrium is mildly dilated. Agitated saline study of the atrial septum is negative, suggesting absence of intracardiac shunt at the atrial level.  Right Atrium: Right atrium is dilated.  Mitral Valve: There is mild to moderate regurgitation with an anteromedial eccentrically directed jet.  Negative bubble study.    CV US Venous Doppler (9.28.23):  There is no evidence of a right lower extremity DVT.  There is no evidence of a left lower extremity DVT.  The contralateral (left) common femoral vein is patent.  There is a right subcutaneous edema located in the proximal thigh, middle thigh, distal thigh, proximal calf and distal calf veins.  The contralateral (right) common femoral vein is patent.  There is a left subcutaneous edema located in the proximal thigh, middle thigh, distal thigh, proximal calf and distal calf veins.  There was no deep vein thrombosis identified in the visualized veins of the bilateral lower extremities.      Echocardiogram (8.15.23):  Technically difficult study.  Optison contrast used.  Rhythm is atrial fibrillation.  Left  Ventricle: There is normal systolic function with a visually estimated ejection fraction of 55 - 60%. Unable to assess due to atrial fibrillation.  Left Atrium: Left atrium is dilated.  Right Ventricle: Normal right ventricular cavity size.  Aortic Valve: The aortic valve is a trileaflet valve.  Mitral Valve: There is mild regurgitation with an anteromedial eccentrically directed jet.    Left Heart Catheterization (3.8.18):  Normal Coronary Angiogram.  Normal LVEDP with No Aortic Stenosis.  EF 60% with No Segmental Wall Motion Abnormalities.  No MR.     Review of patient's allergies indicates:  No Known Allergies    No current facility-administered medications on file prior to encounter.     Current Outpatient Medications on File Prior to Encounter   Medication Sig    aspirin 81 MG Chew chew and swallow 1 tablet by mouth daily    atorvastatin (LIPITOR) 40 MG tablet Take 1 tablet (40 mg total) by mouth once daily.    diltiaZEM (CARDIZEM CD) 360 MG 24 hr capsule Take 1 capsule (360 mg total) by mouth once daily.    losartan (COZAAR) 50 MG tablet Take 1 tablet (50 mg total) by mouth once daily.    metoprolol succinate (TOPROL-XL) 200 MG 24 hr tablet Take 1 tablet (200 mg total) by mouth 2 (two) times daily.    omeprazole (PRILOSEC) 20 MG capsule Take 1 capsule (20 mg total) by mouth once daily. One tab by mouth daily    potassium chloride (KLOR-CON) 20 mEq Pack Take 20 mEq by mouth once daily.    spironolactone (ALDACTONE) 50 MG tablet Take 1 tablet (50 mg total) by mouth once daily.    torsemide (DEMADEX) 10 MG Tab Take 1 tablet (10 mg total) by mouth once daily.    vitamin D (VITAMIN D3) 1000 units Tab Take 1 tablet (1,000 Units total) by mouth once daily.    XARELTO 20 mg Tab TAKE 1 TABLET BY MOUTH DAILY with SUPPER    albuterol (PROVENTIL/VENTOLIN HFA) 90 mcg/actuation inhaler Inhale 2 puffs into the lungs every 6 (six) hours as needed for Wheezing. Rescue (Patient not taking: Reported on 8/22/2023)    cetirizine  (ZYRTEC) 10 MG tablet Take 1 tablet (10 mg total) by mouth once daily. for 14 days     Review of Systems   Unable to perform ROS: Intubated   Respiratory:          Intubated and mechanically ventilated      Objective:     Vital Signs (Most Recent):  Temp: 99.9 °F (37.7 °C) (12/25/23 0400)  Pulse: 73 (12/25/23 0630)  Resp: 20 (12/25/23 0630)  BP: 138/81 (12/25/23 0630)  SpO2: 99 % (12/25/23 0630) Vital Signs (24h Range):  Temp:  [99.4 °F (37.4 °C)-100 °F (37.8 °C)] 99.9 °F (37.7 °C)  Pulse:  [] 73  Resp:  [10-23] 20  SpO2:  [94 %-100 %] 99 %  BP: (108-183)/() 138/81     Weight: 111.1 kg (244 lb 14.9 oz)  Body mass index is 34.18 kg/m².    SpO2: 99 %         Intake/Output Summary (Last 24 hours) at 12/25/2023 0644  Last data filed at 12/25/2023 0200  Gross per 24 hour   Intake 1342.05 ml   Output 1570 ml   Net -227.95 ml         Lines/Drains/Airways       Central Venous Catheter Line  Duration             Percutaneous Central Line Insertion/Assessment - Triple Lumen  12/20/23 1056 Internal Jugular Right 4 days              Drain  Duration                  NG/OG Tube 12/20/23 0930 16 Fr. Center mouth 4 days         Urethral Catheter 12/20/23 1007 4 days              Airway  Duration                  Airway - Non-Surgical 12/20/23 0930 Endotracheal Tube 4 days              Peripheral Intravenous Line  Duration                  Peripheral IV - Single Lumen 12/19/23 1045 20 G Lateral;Left Breast 5 days         Peripheral IV - Single Lumen 12/19/23 1053 20 G Right Antecubital 5 days                  Significant Labs:  Recent Results (from the past 72 hour(s))   Sodium    Collection Time: 12/22/23 12:47 PM   Result Value Ref Range    Sodium Level 150 (H) 136 - 145 mmol/L   Osmolality, Serum    Collection Time: 12/22/23 12:47 PM   Result Value Ref Range    Osmolality 314 (H) 280 - 300 mOsm/kg   Sodium    Collection Time: 12/22/23  6:11 PM   Result Value Ref Range    Sodium Level 152 (H) 136 - 145 mmol/L    Osmolality, Serum    Collection Time: 12/22/23  6:11 PM   Result Value Ref Range    Osmolality 314 (H) 280 - 300 mOsm/kg   POCT glucose    Collection Time: 12/22/23  6:36 PM   Result Value Ref Range    POCT Glucose 100 70 - 110 mg/dL   Osmolality, Serum    Collection Time: 12/22/23 11:57 PM   Result Value Ref Range    Osmolality 315 (H) 280 - 300 mOsm/kg   Phosphorus    Collection Time: 12/23/23 12:36 AM   Result Value Ref Range    Phosphorus Level 1.6 (L) 2.3 - 4.7 mg/dL   Magnesium    Collection Time: 12/23/23 12:36 AM   Result Value Ref Range    Magnesium Level 2.30 1.60 - 2.60 mg/dL   Comprehensive Metabolic Panel    Collection Time: 12/23/23 12:36 AM   Result Value Ref Range    Sodium Level 153 (H) 136 - 145 mmol/L    Potassium Level 3.5 3.5 - 5.1 mmol/L    Chloride 119 (H) 98 - 107 mmol/L    Carbon Dioxide 25 23 - 31 mmol/L    Glucose Level 114 82 - 115 mg/dL    Blood Urea Nitrogen 12.5 8.4 - 25.7 mg/dL    Creatinine 1.04 0.73 - 1.18 mg/dL    Calcium Level Total 8.3 (L) 8.8 - 10.0 mg/dL    Protein Total 6.3 5.8 - 7.6 gm/dL    Albumin Level 2.8 (L) 3.4 - 4.8 g/dL    Globulin 3.5 2.4 - 3.5 gm/dL    Albumin/Globulin Ratio 0.8 (L) 1.1 - 2.0 ratio    Bilirubin Total 1.6 (H) <=1.5 mg/dL    Alkaline Phosphatase 58 40 - 150 unit/L    Alanine Aminotransferase 33 0 - 55 unit/L    Aspartate Aminotransferase 36 (H) 5 - 34 unit/L    eGFR >60 mls/min/1.73/m2   CBC with Differential    Collection Time: 12/23/23 12:36 AM   Result Value Ref Range    WBC 15.44 (H) 4.50 - 11.50 x10(3)/mcL    RBC 4.25 (L) 4.70 - 6.10 x10(6)/mcL    Hgb 12.6 (L) 14.0 - 18.0 g/dL    Hct 40.4 (L) 42.0 - 52.0 %    MCV 95.1 (H) 80.0 - 94.0 fL    MCH 29.6 27.0 - 31.0 pg    MCHC 31.2 (L) 33.0 - 36.0 g/dL    RDW 15.8 11.5 - 17.0 %    Platelet 138 130 - 400 x10(3)/mcL    MPV 10.2 7.4 - 10.4 fL    Neut % 79.3 %    Lymph % 8.6 %    Mono % 9.1 %    Eos % 1.9 %    Basophil % 0.3 %    Lymph # 1.33 0.6 - 4.6 x10(3)/mcL    Neut # 12.25 (H) 2.1 - 9.2 x10(3)/mcL     Mono # 1.40 (H) 0.1 - 1.3 x10(3)/mcL    Eos # 0.29 0 - 0.9 x10(3)/mcL    Baso # 0.04 <=0.2 x10(3)/mcL    IG# 0.13 (H) 0 - 0.04 x10(3)/mcL    IG% 0.8 %    NRBC% 0.0 %   Urinalysis, Reflex to Urine Culture    Collection Time: 12/23/23  5:23 AM    Specimen: Urine   Result Value Ref Range    Color, UA Light-Orange (A) Yellow, Light-Yellow, Colorless, Straw, Dark-Yellow    Appearance, UA Turbid (A) Clear    Specific Gravity, UA 1.040 (H) 1.005 - 1.030    pH, UA 6.0 5.0 - 8.5    Protein, UA 2+ (A) Negative    Glucose, UA Normal Negative, Normal    Ketones, UA Negative Negative    Blood, UA 3+ (A) Negative    Bilirubin, UA Negative Negative    Urobilinogen, UA 2.0 (A) 0.2, 1.0, Normal    Nitrites, UA Negative Negative    Leukocyte Esterase,  (A) Negative    WBC, UA 11-20 (A) None Seen, 0-2, 3-5, 0-5 /HPF    Bacteria, UA Trace None Seen, Trace /HPF    Squamous Epithelial Cells, UA Trace None Seen /HPF    Renal Epithelial Cells, UA Few (A) None Seen /HPF    Mucous, UA Trace (A) None Seen /LPF    Amorphous Crystal, UA Occasional /uL    RBC, UA 21-50 (A) None Seen, 0-2, 3-5, 0-5 /HPF   Urine culture    Collection Time: 12/23/23  5:23 AM    Specimen: Urine   Result Value Ref Range    Urine Culture No Growth At 24 Hours    RT Blood Gas    Collection Time: 12/23/23  6:13 AM   Result Value Ref Range    Sample Type Arterial Blood     Sample site Right Radial Artery     Drawn by swcrt     pH, Blood gas 7.430 7.350 - 7.450    pCO2, Blood gas 43.0 35.0 - 45.0 mmHg    pO2, Blood gas 93.0 80.0 - 100.0 mmHg    Sodium, Blood Gas 148 (H) 137 - 145 mmol/L    Potassium, Blood Gas 3.4 (L) 3.5 - 5.0 mmol/L    Calcium Level Ionized 1.15 1.12 - 1.23 mmol/L    TOC2, Blood gas 29.8 mmol/L    Base Excess, Blood gas 3.70 mmol/L    sO2, Blood gas 97.0 %    HCO3, Blood gas 28.5 (H) 22.0 - 26.0 mmol/L    MODE AC     Oxygen Device, Blood gas Ventilator     FIO2, Blood gas 40 %    Mech Vt 475 ml    Mech RR 20 b/min    PEEP 5.0 cmH2O    Osmolality, Serum    Collection Time: 12/23/23  6:17 AM   Result Value Ref Range    Osmolality 318 (H) 280 - 300 mOsm/kg   Sodium    Collection Time: 12/23/23  6:18 AM   Result Value Ref Range    Sodium Level 151 (H) 136 - 145 mmol/L   Sodium    Collection Time: 12/23/23  1:52 PM   Result Value Ref Range    Sodium Level 151 (H) 136 - 145 mmol/L   Osmolality, Serum    Collection Time: 12/23/23  1:52 PM   Result Value Ref Range    Osmolality 314 (H) 280 - 300 mOsm/kg   POCT glucose    Collection Time: 12/23/23  5:32 PM   Result Value Ref Range    POCT Glucose 109 70 - 110 mg/dL   Blood Culture    Collection Time: 12/23/23  6:31 PM    Specimen: Central Line; Blood   Result Value Ref Range    CULTURE, BLOOD ([facility]) No Growth At 24 Hours    Blood Culture    Collection Time: 12/23/23  6:31 PM    Specimen: IV Site; Blood   Result Value Ref Range    CULTURE, BLOOD ([facility]) No Growth At 24 Hours    Sodium    Collection Time: 12/23/23  6:31 PM   Result Value Ref Range    Sodium Level 150 (H) 136 - 145 mmol/L   Osmolality, Serum    Collection Time: 12/23/23  6:31 PM   Result Value Ref Range    Osmolality 317 (H) 280 - 300 mOsm/kg   Sodium    Collection Time: 12/23/23 11:45 PM   Result Value Ref Range    Sodium Level 152 (H) 136 - 145 mmol/L   Osmolality, Serum    Collection Time: 12/23/23 11:45 PM   Result Value Ref Range    Osmolality 321 (HH) 280 - 300 mOsm/kg   POCT glucose    Collection Time: 12/24/23 12:12 AM   Result Value Ref Range    POCT Glucose 112 (H) 70 - 110 mg/dL   RT Blood Gas    Collection Time: 12/24/23  5:00 AM   Result Value Ref Range    Sample Type Arterial Blood     Sample site Right Radial Artery     Drawn by swcrt     pH, Blood gas 7.460 (H) 7.350 - 7.450    pCO2, Blood gas 39.0 35.0 - 45.0 mmHg    pO2, Blood gas 101.0 (H) 80.0 - 100.0 mmHg    Sodium, Blood Gas 145 137 - 145 mmol/L    Potassium, Blood Gas 3.5 3.5 - 5.0 mmol/L    Calcium Level Ionized 1.12 1.12 - 1.23 mmol/L    TOC2, Blood gas 28.9 mmol/L     Base Excess, Blood gas 3.70 (H) -2.00 - 2.00 mmol/L    sO2, Blood gas 98.1 %    HCO3, Blood gas 27.7 (H) 22.0 - 26.0 mmol/L    THb, Blood gas 12.6 12 - 16 g/dL    O2 Hb, Blood Gas 96.2 94.0 - 97.0 %    CO Hgb 1.1 0.5 - 1.5 %    Met Hgb 0.8 0.4 - 1.5 %    Allens Test Yes     MODE AC     Oxygen Device, Blood gas Ventilator     FIO2, Blood gas 40 %    Mech Vt 475 ml    Mech RR 20 b/min    PEEP 5.0 cmH2O   POCT glucose    Collection Time: 12/24/23  5:24 AM   Result Value Ref Range    POCT Glucose 131 (H) 70 - 110 mg/dL   Comprehensive Metabolic Panel    Collection Time: 12/24/23  6:05 AM   Result Value Ref Range    Sodium Level 151 (H) 136 - 145 mmol/L    Potassium Level 3.9 3.5 - 5.1 mmol/L    Chloride 116 (H) 98 - 107 mmol/L    Carbon Dioxide 23 23 - 31 mmol/L    Glucose Level 148 (H) 82 - 115 mg/dL    Blood Urea Nitrogen 16.3 8.4 - 25.7 mg/dL    Creatinine 0.87 0.73 - 1.18 mg/dL    Calcium Level Total 7.7 (L) 8.8 - 10.0 mg/dL    Protein Total 5.7 (L) 5.8 - 7.6 gm/dL    Albumin Level 2.5 (L) 3.4 - 4.8 g/dL    Globulin 3.2 2.4 - 3.5 gm/dL    Albumin/Globulin Ratio 0.8 (L) 1.1 - 2.0 ratio    Bilirubin Total 1.7 (H) <=1.5 mg/dL    Alkaline Phosphatase 55 40 - 150 unit/L    Alanine Aminotransferase 38 0 - 55 unit/L    Aspartate Aminotransferase 38 (H) 5 - 34 unit/L    eGFR >60 mls/min/1.73/m2   Magnesium    Collection Time: 12/24/23  6:05 AM   Result Value Ref Range    Magnesium Level 2.20 1.60 - 2.60 mg/dL   Phosphorus    Collection Time: 12/24/23  6:05 AM   Result Value Ref Range    Phosphorus Level 2.9 2.3 - 4.7 mg/dL   Osmolality, Serum    Collection Time: 12/24/23  6:05 AM   Result Value Ref Range    Osmolality 317 (H) 280 - 300 mOsm/kg   CBC with Differential    Collection Time: 12/24/23  6:05 AM   Result Value Ref Range    WBC 14.32 (H) 4.50 - 11.50 x10(3)/mcL    RBC 4.06 (L) 4.70 - 6.10 x10(6)/mcL    Hgb 12.3 (L) 14.0 - 18.0 g/dL    Hct 38.0 (L) 42.0 - 52.0 %    MCV 93.6 80.0 - 94.0 fL    MCH 30.3 27.0 - 31.0 pg     MCHC 32.4 (L) 33.0 - 36.0 g/dL    RDW 15.2 11.5 - 17.0 %    Platelet 135 130 - 400 x10(3)/mcL    MPV 10.3 7.4 - 10.4 fL    Neut % 81.1 %    Lymph % 9.0 %    Mono % 6.9 %    Eos % 1.6 %    Basophil % 0.4 %    Lymph # 1.29 0.6 - 4.6 x10(3)/mcL    Neut # 11.61 (H) 2.1 - 9.2 x10(3)/mcL    Mono # 0.99 0.1 - 1.3 x10(3)/mcL    Eos # 0.23 0 - 0.9 x10(3)/mcL    Baso # 0.06 <=0.2 x10(3)/mcL    IG# 0.14 (H) 0 - 0.04 x10(3)/mcL    IG% 1.0 %    NRBC% 0.0 %    IPF 3.9 0.9 - 11.2 %   Sodium    Collection Time: 12/24/23 12:50 PM   Result Value Ref Range    Sodium Level 150 (H) 136 - 145 mmol/L   Osmolality, Serum    Collection Time: 12/24/23 12:50 PM   Result Value Ref Range    Osmolality 320 (H) 280 - 300 mOsm/kg   Sodium    Collection Time: 12/24/23  5:42 PM   Result Value Ref Range    Sodium Level 148 (H) 136 - 145 mmol/L   Osmolality, Serum    Collection Time: 12/24/23  5:42 PM   Result Value Ref Range    Osmolality 316 (H) 280 - 300 mOsm/kg   POCT glucose    Collection Time: 12/24/23  5:48 PM   Result Value Ref Range    POCT Glucose 112 (H) 70 - 110 mg/dL   Osmolality, Serum    Collection Time: 12/25/23  3:34 AM   Result Value Ref Range    Osmolality 314 (H) 280 - 300 mOsm/kg   Phosphorus    Collection Time: 12/25/23  3:34 AM   Result Value Ref Range    Phosphorus Level 2.7 2.3 - 4.7 mg/dL   Magnesium    Collection Time: 12/25/23  3:34 AM   Result Value Ref Range    Magnesium Level 2.40 1.60 - 2.60 mg/dL   Comprehensive Metabolic Panel    Collection Time: 12/25/23  3:34 AM   Result Value Ref Range    Sodium Level 148 (H) 136 - 145 mmol/L    Potassium Level 3.7 3.5 - 5.1 mmol/L    Chloride 117 (H) 98 - 107 mmol/L    Carbon Dioxide 22 (L) 23 - 31 mmol/L    Glucose Level 108 82 - 115 mg/dL    Blood Urea Nitrogen 19.9 8.4 - 25.7 mg/dL    Creatinine 0.87 0.73 - 1.18 mg/dL    Calcium Level Total 8.5 (L) 8.8 - 10.0 mg/dL    Protein Total 6.3 5.8 - 7.6 gm/dL    Albumin Level 2.5 (L) 3.4 - 4.8 g/dL    Globulin 3.8 (H) 2.4 - 3.5  gm/dL    Albumin/Globulin Ratio 0.7 (L) 1.1 - 2.0 ratio    Bilirubin Total 1.6 (H) <=1.5 mg/dL    Alkaline Phosphatase 72 40 - 150 unit/L    Alanine Aminotransferase 46 0 - 55 unit/L    Aspartate Aminotransferase 55 (H) 5 - 34 unit/L    eGFR >60 mls/min/1.73/m2   CBC with Differential    Collection Time: 12/25/23  3:34 AM   Result Value Ref Range    WBC 15.28 (H) 4.50 - 11.50 x10(3)/mcL    RBC 4.14 (L) 4.70 - 6.10 x10(6)/mcL    Hgb 12.0 (L) 14.0 - 18.0 g/dL    Hct 39.9 (L) 42.0 - 52.0 %    MCV 96.4 (H) 80.0 - 94.0 fL    MCH 29.0 27.0 - 31.0 pg    MCHC 30.1 (L) 33.0 - 36.0 g/dL    RDW 15.2 11.5 - 17.0 %    Platelet 149 130 - 400 x10(3)/mcL    MPV 10.4 7.4 - 10.4 fL    Neut % 81.2 %    Lymph % 7.4 %    Mono % 7.3 %    Eos % 2.2 %    Basophil % 0.6 %    Lymph # 1.13 0.6 - 4.6 x10(3)/mcL    Neut # 12.40 (H) 2.1 - 9.2 x10(3)/mcL    Mono # 1.12 0.1 - 1.3 x10(3)/mcL    Eos # 0.34 0 - 0.9 x10(3)/mcL    Baso # 0.09 <=0.2 x10(3)/mcL    IG# 0.20 (H) 0 - 0.04 x10(3)/mcL    IG% 1.3 %    NRBC% 0.0 %     Significant Imaging:  Imaging Results              IR Thrombectomy Intracranial Inc all Imaging (Final result)  Result time 12/19/23 15:11:53      Final result by Tani Calderon MD (12/19/23 15:11:53)                   Impression:      Fluoroscopic assistance provided as above.      Electronically signed by: Tani Calderon  Date:    12/19/2023  Time:    15:11               Narrative:    EXAMINATION:  IR THROMBECTOMY INTRACRANIAL INC ALL IMAGING    CLINICAL HISTORY:  Cerebral infarction, unspecifiedstroke;    FINDINGS:  Fluoroscopic assistance provided during vascular procedure.  Images were independently interpreted by the attending physician performing the procedure.    Fluoro time 9.7 minutes.    Reference Air Kerma: 917 mGy.                                       CTA STROKE MULTI-PHASE (Final result)  Result time 12/19/23 12:59:46      Final result by Laureen Christina MD (12/19/23 12:59:46)                   Impression:       Irregular appearance of the right internal carotid artery near the skull base and petrous portion.  This is of uncertain etiology.  This could be related to soft atheromatous plaque, ill-defined dissection flap or artifact.    Poor enhancement of the right anterior circulation including the MCA and HÉCTOR on arterial phase.  There is delayed enhancement of the right HÉCTOR and MCA seen on delayed phase postcontrast imaging suggesting upstream/inflow abnormality.    Very subtle asymmetric loss of gray-white differentiation in the right cerebral hemisphere most pronounced at the frontal lobe and basal ganglia.  No appreciable hemorrhage.    Findings discussed with clinician caring for patient Dr. Randle 12/19/2023 12:39.      Electronically signed by: Laureen Christina  Date:    12/19/2023  Time:    12:59               Narrative:    EXAMINATION:  CTA STROKE MULTI-PHASE    CLINICAL HISTORY:  Neuro deficit, acute, stroke suspected;    TECHNIQUE:  CT angiogram was performed from the level of the jackie to the vertex prior to and following the IV administration of intravenous contrast.  Axial, sagittal and coronal reconstructions and maximum intensity projection reconstructions were performed. Arterial stenosis percentages are based on NASCET measurement criteria.    Automated tube current modulation, weight-based exposure dosing, and/or iterative reconstruction technique utilized to reach lowest reasonably achievable exposure rate.    DLP: 2045 mGycm    COMPARISON:  CT head 12/19/2023 at 10:57 hours    FINDINGS:  CTA NECK:    AORTIC ARCH AND GREAT VESSELS: 2 vessel left aortic arch.    RIGHT ICA: There is atherosclerotic plaque at the carotid bulb and proximal internal carotid artery with less than 50% stenosis.  There is irregular contour and inhomogeneous enhancement of the cervical carotid artery at the skull base and at the petrous carotid artery (for example series 1, image 289).    LEFT ICA: Mild atherosclerotic  plaque at the carotid bulb without hemodynamically significant stenosis.    VERTEBRAL ARTERIES: Diminutive vertebral arteries without stenosis.    CTA HEAD:    ANTERIOR CIRCULATION: On the arterial phase imaging there is asymmetric hypo perfusion and irregular appearance of the cervical carotid artery on the right.  There is poor enhancement at the HÉCTOR and M1 segment.  Contrast fades distally towards the M2 segment.  There is gross asymmetric hypoenhancement of the vasculature at the sylvian fissure when comparing right to left on the arterial phase.  On a slightly delayed phase obtained approximately 30-40 seconds later there is enhancement at the right anterior circulation which is now more symmetric compared to the left suggesting potential delayed in flow phenomenon or perfusion via the [id number].  The left anterior circulation enhances normally without significant stenosis.    POSTERIOR CIRCULATION: No hemodynamically significant stenosis, aneurysmal dilatation, or dissection.    NON-VASCULAR STRUCTURES (LIMITED EVALUATION): There is very subtle loss of gray-white differentiation in the region of the insular cortex and right frontal lobe and slight blurring of delineation of the basal ganglia on the right.  No appreciable hemorrhage.    Poor dentition.  Dental caries and periapical lucency.                                       CT HEAD FOR STROKE (Final result)  Result time 12/19/23 11:33:25   Procedure changed from CT Head Without Contrast     Final result by Tani Calderon MD (12/19/23 11:33:25)                   Impression:      No acute intracranial findings or significant interval change compared to May 2023.      Electronically signed by: Tani Calderon  Date:    12/19/2023  Time:    11:33               Narrative:    EXAMINATION:  CT HEAD FOR STROKE    CLINICAL HISTORY:  Neuro deficit, acute, stroke suspected;    TECHNIQUE:  CT imaging of the head performed from the skull base to the vertex without  intravenous contrast.  mGycm. Automatic exposure control, adjustment of mA/kV or iterative reconstruction technique was used to reduce radiation.    COMPARISON:  23 May 2023    FINDINGS:  There is no acute cortical infarct, hemorrhage or mass lesion.  No new parenchymal attenuation abnormality.  Ventricular size is stable.  There are vascular calcifications.    Visualized paranasal sinuses and mastoid air cells are clear.                                    EKG:        Telemetry:  AF    Physical Exam  Vitals and nursing note reviewed.   Constitutional:       General: He is not in acute distress.     Appearance: He is ill-appearing.   HENT:      Head:      Comments: Cerebral Dressing in Place Post Craniectomy.  Neck:      Comments: RIJ Venous Cath  Cardiovascular:      Rate and Rhythm: Tachycardia present. Rhythm irregular.      Heart sounds: Normal heart sounds.      Comments: AF RVR  Pulmonary:      Effort: No respiratory distress.      Comments: Intubation/Mechanical Ventilation FIO2 40%  Abdominal:      Palpations: Abdomen is soft.      Comments: OG Tube   Genitourinary:     Comments: Fernandez Catheter  Musculoskeletal:      Comments: Legs are Warm   Skin:     General: Skin is warm and dry.   Neurological:      Comments: Awakes off of sedation       Home Medications:   No current facility-administered medications on file prior to encounter.     Current Outpatient Medications on File Prior to Encounter   Medication Sig Dispense Refill    aspirin 81 MG Chew chew and swallow 1 tablet by mouth daily 90 tablet 3    atorvastatin (LIPITOR) 40 MG tablet Take 1 tablet (40 mg total) by mouth once daily. 90 tablet 1    diltiaZEM (CARDIZEM CD) 360 MG 24 hr capsule Take 1 capsule (360 mg total) by mouth once daily. 90 capsule 1    losartan (COZAAR) 50 MG tablet Take 1 tablet (50 mg total) by mouth once daily. 90 tablet 1    metoprolol succinate (TOPROL-XL) 200 MG 24 hr tablet Take 1 tablet (200 mg total) by mouth 2 (two)  times daily. 180 tablet 1    omeprazole (PRILOSEC) 20 MG capsule Take 1 capsule (20 mg total) by mouth once daily. One tab by mouth daily 30 capsule 2    potassium chloride (KLOR-CON) 20 mEq Pack Take 20 mEq by mouth once daily. 30 packet 5    spironolactone (ALDACTONE) 50 MG tablet Take 1 tablet (50 mg total) by mouth once daily. 90 tablet 1    torsemide (DEMADEX) 10 MG Tab Take 1 tablet (10 mg total) by mouth once daily. 30 tablet 0    vitamin D (VITAMIN D3) 1000 units Tab Take 1 tablet (1,000 Units total) by mouth once daily. 30 tablet 1    XARELTO 20 mg Tab TAKE 1 TABLET BY MOUTH DAILY with SUPPER 90 tablet 3    albuterol (PROVENTIL/VENTOLIN HFA) 90 mcg/actuation inhaler Inhale 2 puffs into the lungs every 6 (six) hours as needed for Wheezing. Rescue (Patient not taking: Reported on 8/22/2023) 8.5 g 0    cetirizine (ZYRTEC) 10 MG tablet Take 1 tablet (10 mg total) by mouth once daily. for 14 days 14 tablet 0     Current Inpatient Medications:    Current Facility-Administered Medications:     0.9%  NaCl infusion (for blood administration), , Intravenous, Q24H PRN, Fidel Kraus, AGACNP-BC    0.9%  NaCl infusion, , Intravenous, Continuous, Ijeoma Hernandez NP, Stopped at 12/23/23 0927    acetaminophen oral solution 650 mg, 650 mg, Oral, Q4H PRN, Kenny Richter DO, 650 mg at 12/24/23 0439    aspirin chewable tablet 81 mg, 81 mg, Oral, Daily, Maria Victoria Rosales, GIANNI, 81 mg at 12/24/23 0909    ceFEPIme (MAXIPIME) 1 g in dextrose 5 % in water (D5W) 100 mL IVPB (MB+), 1 g, Intravenous, Q8H, Kenny Richter DO, Last Rate: 200 mL/hr at 12/25/23 0616, 1 g at 12/25/23 0616    dexmedetomidine (PRECEDEX) 400mcg/100mL 0.9% NaCL infusion, 0-1.4 mcg/kg/hr, Intravenous, Continuous, Italo Orozco MD, Stopped at 12/24/23 2326    dextrose 10% bolus 125 mL 125 mL, 12.5 g, Intravenous, PRN, Eleazar Westbrook MD    dextrose 10% bolus 250 mL 250 mL, 25 g, Intravenous, PRN, Eleazar Westbrook MD     diltiaZEM tablet 240 mg, 240 mg, Per OG tube, Daily, Kenny Richter DO, 240 mg at 12/24/23 2317    famotidine (PF) injection 20 mg, 20 mg, Intravenous, Daily, Kenny Richter DO, 20 mg at 12/24/23 0909    fentaNYL 2500 mcg in 0.9% sodium chloride 250 mL infusion premix (titrating), 0-250 mcg/hr, Intravenous, Continuous, Arun Norman DO, Last Rate: 15 mL/hr at 12/25/23 0200, 150 mcg/hr at 12/25/23 0200    fentaNYL injection 50 mcg, 50 mcg, Intravenous, Q1H PRN, Eleazar Westbrook MD, 50 mcg at 12/24/23 1950    glucagon (human recombinant) injection 1 mg, 1 mg, Intramuscular, PRN, Eleazar Westbrook MD    hydrALAZINE injection 10 mg, 10 mg, Intravenous, Q2H PRN, Kenny Richter DO    insulin aspart U-100 injection 0-5 Units, 0-5 Units, Subcutaneous, Q6H PRN, Eleazar Westbrook MD, 2 Units at 12/20/23 0624    labetaloL injection 10 mg, 10 mg, Intravenous, Q2H PRN, Kenny Richter DO    levETIRAcetam in NaCl (iso-os) IVPB 1,000 mg, 1,000 mg, Intravenous, Q12H, Italo Orozco MD, Stopped at 12/24/23 2146    losartan tablet 50 mg, 50 mg, Per OG tube, Daily, Kenny Richter DO, 50 mg at 12/24/23 2318    mannitol 20% infusion 75 g, 75 g, Intravenous, Once, Fidel Kraus, AGACNP-BC    metoprolol injection 5 mg, 5 mg, Intravenous, Q5 Min PRN, Patrick Gipson DO, 5 mg at 12/22/23 0904    metoprolol tartrate (LOPRESSOR) tablet 100 mg, 100 mg, Per OG tube, BID, Kenny Richter DO, 100 mg at 12/24/23 2318    NORepinephrine 8 mg in dextrose 5% 250 mL infusion, 0-3 mcg/kg/min, Intravenous, Continuous, Italo Orozco MD, Stopped at 12/20/23 1929    ondansetron injection 8 mg, 8 mg, Intravenous, Q6H PRN, Patrick Gipson DO, 8 mg at 12/20/23 0045    propofol (DIPRIVAN) 10 mg/mL infusion, 0-50 mcg/kg/min, Intravenous, Continuous, Gasper Barlow MD, Stopped at 12/23/23 1045    sodium chloride 0.9% flush 10 mL, 10 mL, Intravenous, PRN, Ijeoma Hernandez, NP    Pharmacy to dose Vancomycin consult, , , Once  **AND** vancomycin - pharmacy to dose, , Intravenous, pharmacy to manage frequency, Kenny Richter, DO    vancomycin 1.5 g in dextrose 5 % 250 mL IVPB (ready to mix), 1,500 mg, Intravenous, Q12H, KODI Drake MD, Last Rate: 166.7 mL/hr at 12/25/23 0616, 1,500 mg at 12/25/23 0616    VTE Risk Mitigation (From admission, onward)           Ordered     Reason for No Pharmacological VTE Prophylaxis  Once        Question:  Reasons:  Answer:  Risk of Bleeding    12/19/23 1551     IP VTE HIGH RISK PATIENT  Once         12/19/23 1551     Place sequential compression device  Until discontinued         12/19/23 1551                  Assessment:   Persistent Atrial Fibrillation with Intermittent RVR- now rate controlled with po medications via OG tube     - [id number]: 4    - Anticoagulation on Hold in Setting of Cerebral Bleed Post Stroke  Acute Ischemic CVA (Right ICA & M3 Branch) with Hemorrhagic Conversion Requiring Craniectomy    - Status Post Thrombectomy  Acute Hypoxemic Respiratory Failure Requiring Intubation/Mechanical Ventilation  History of VF Arrest due to Prolonged QT Interval/Hypokalemia    - Status Post Dual Chamber ICD     - Normal Coronaries on 3.8.18    - History of MI in 2003 (No Angiographic Evidence of CAD in 2018)  Leukocytosis  - Intermittent fever overnight   Hypertension- Above Gal Requiring Cleviprex Infusion  Hyperlipidemia  Valvular Heart Disease    - MR: Mild to Moderate  Elevated TSH  Obstructive Sleep Apnea    Plan:   Continue home diltiazem 240 mg .losartan and metoprolol  tartrate 100 mg BID  Lopressor IV PRN HR > 120 Sustained.  Not on Anticoagulation given current clinic condition/Cerebral Bleed/Hemorrhagic Transformation. Will look for clearance by Neurological Team before considering long-term anticoagulation therapy.  Vent Management as per ICU Team  Antibiotic per primary team   Continue Supportive Care as per Nursing Team.  Will be available. Please call with any questions        John Davis, ANTONIOP  Cardiology  [facility] - [address] [place] [address]  12/25/2023 8:10 AM     I have seen the patient, reviewed the Nurse Practitioner's note, assessment and plan. I have personally interviewed and examined the patient at bedside and agree with the findings. Medical decision making listed above were done under my guidance.

## 2023-12-25 NOTE — PROGRESS NOTES
Might be a little more responsive off Precedex and now on a fentanyl drip   Sodium and osmolality optimized  Defect still full  Incision clean and dry  Continue same   I have reviewed and agree with all pertinent clinical information, including history and physical exam and agree with treatment plan of the PA.

## 2023-12-25 NOTE — PLAN OF CARE
Problem: Adult Inpatient Plan of Care  Goal: Plan of Care Review  Outcome: Ongoing, Progressing  Goal: Patient-Specific Goal (Individualized)  Outcome: Ongoing, Progressing  Goal: Absence of Hospital-Acquired Illness or Injury  Outcome: Ongoing, Progressing  Goal: Optimal Comfort and Wellbeing  Outcome: Ongoing, Progressing  Goal: Readiness for Transition of Care  Outcome: Ongoing, Progressing     Problem: Adjustment to Illness (Stroke, Ischemic/Transient Ischemic Attack)  Goal: Optimal Coping  Outcome: Ongoing, Progressing     Problem: Bowel Elimination Impaired (Stroke, Ischemic/Transient Ischemic Attack)  Goal: Effective Bowel Elimination  Outcome: Ongoing, Progressing     Problem: Cerebral Tissue Perfusion (Stroke, Ischemic/Transient Ischemic Attack)  Goal: Optimal Cerebral Tissue Perfusion  Outcome: Ongoing, Progressing     Problem: Cognitive Impairment (Stroke, Ischemic/Transient Ischemic Attack)  Goal: Optimal Cognitive Function  Outcome: Ongoing, Progressing     Problem: Communication Impairment (Stroke, Ischemic/Transient Ischemic Attack)  Goal: Improved Communication Skills  Outcome: Ongoing, Progressing     Problem: Functional Ability Impaired (Stroke, Ischemic/Transient Ischemic Attack)  Goal: Optimal Functional Ability  Outcome: Ongoing, Progressing     Problem: Respiratory Compromise (Stroke, Ischemic/Transient Ischemic Attack)  Goal: Effective Oxygenation and Ventilation  Outcome: Ongoing, Progressing

## 2023-12-25 NOTE — NURSING
Nurses Note -- 4 Eyes      12/25/2023   3:18 PM      Skin assessed during: Daily Assessment      [] No Altered Skin Integrity Present    [x]Prevention Measures Documented      [x] Yes- Altered Skin Integrity Present or Discovered   [] LDA Added if Not in Epic (Describe Wound)   [] New Altered Skin Integrity was Present on Admit and Documented in LDA   [] Wound Image Taken    Wound Care Consulted? No    Attending Nurse:  MÓNICA Steele    Second RN/Staff Member:   MÓNICA Horner

## 2023-12-25 NOTE — PROGRESS NOTES
[facility] - [facility] ICU  Pulmonary Critical Care Note    Patient Name: Delio Daniel Jr.  MRN: [id number]  Admission Date: 12/19/2023  Hospital Length of Stay: 6 days  Code Status: Full Code  Attending Provider: KODI Drake MD  Primary Care Provider: Candy, Primary Doctor     Subjective:     HPI:   Delio Daniel Jr. is a 68 y.o. 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 His last known normal was 9:15 per EMS. Stroke protocol in ED initiated. Unofficial CT head showed possible dense R MCA. Pt taken to cath lab for BRAD thrombectomy. Admitted to ICU for post-operative care and monitoring.     Hospital Course/Significant events:  12/19/2023 - Admitted to ICU s/p right internal carotid artery thrombectomy  12/20/2023 - s/p craniectomy    24 Hour Interval History:  Afebrile overnight. Current drips: fentanyl at 150. No significant change in neurological status, still remains minimally responsive. Labs this AM: WBC 15.28, H/H stable, Na+ 148, corrected calcium 9.7. I/O: 1.2 L urine past 24 hours, 300 mL drain output, net positive 1.2 L.     Past Medical History:   Diagnosis Date    Arthritis     Atrial fibrillation     BPH (benign prostatic hyperplasia)     Cardiac arrest     Coronary artery disease     Cyst, kidney, acquired     Diverticulosis     Hyperlipidemia     Hypertension     MI (myocardial infarction)     Obesity     Steatosis of liver        Past Surgical History:   Procedure Laterality Date    A-V CARDIAC PACEMAKER INSERTION Right     CARDIAC CATHETERIZATION      COLONOSCOPY W/ BIOPSIES      CRANIECTOMY Right 12/20/2023    Procedure:  CRANIECTOMY;  Surgeon: Artem Can MD;  [facility];  Service: Neurosurgery;  Laterality: Right;    excision of colon         Social History     Socioeconomic History    Marital status:     Number of children: 9   Occupational History    Occupation: retired   Tobacco Use    Smoking status: Former    Smokeless tobacco: Never   Substance and Sexual Activity    Alcohol use: Not Currently    Drug use: Not Currently    Sexual activity: Not Currently     Partners: Female     Social Determinants of Health     Financial Resource Strain: Low Risk  (10/19/2022)    Overall Financial Resource Strain (CARDIA)     Difficulty of Paying Living Expenses: Not hard at all   Food Insecurity: No Food Insecurity (10/19/2022)    Hunger Vital Sign     Worried About Running Out of Food in the Last Year: Never true     Ran Out of Food in the Last Year: Never true   Transportation Needs: No Transportation Needs (10/19/2022)    PRAPARE - Transportation     Lack of Transportation (Medical): No     Lack of Transportation (Non-Medical): No   Physical Activity: Sufficiently Active (10/19/2022)    Exercise Vital Sign     Days of Exercise per Week: 6 days     Minutes of Exercise per Session: 60 min   Stress: No Stress Concern Present (10/19/2022)    [employer] - Occupational Stress Questionnaire     Feeling of Stress : Not at all   Social Connections: Unknown (10/19/2022)    Social Connection and Isolation Panel [NHANES]     Frequency of Communication with Friends and Family: More than three times a week     Frequency of Social Gatherings with Friends and Family: More than three times a week     Attends Anabaptism Services: More than 4 times per year     Active Member of Clubs or Organizations: No     Attends Club or Organization Meetings: Never   Housing Stability: Low Risk  (10/19/2022)    Housing Stability Vital Sign     Unable to Pay for Housing in the Last Year: No     Number of Places Lived in the  Last Year: 1     Unstable Housing in the Last Year: No       Current Outpatient Medications   Medication Instructions    albuterol (PROVENTIL/VENTOLIN HFA) 90 mcg/actuation inhaler 2 puffs, Inhalation, Every 6 hours PRN, Rescue    aspirin 81 MG Chew chew and swallow 1 tablet by mouth daily    atorvastatin (LIPITOR) 40 mg, Oral, Daily    cetirizine (ZYRTEC) 10 mg, Oral, Daily    diltiaZEM (CARDIZEM CD) 360 mg, Oral, Daily    losartan (COZAAR) 50 mg, Oral, Daily    metoprolol succinate (TOPROL-XL) 200 mg, Oral, 2 times daily    omeprazole (PRILOSEC) 20 mg, Oral, Daily, One tab by mouth daily    potassium chloride (KLOR-CON) 20 mEq Pack 20 mEq, Oral, Daily    spironolactone (ALDACTONE) 50 mg, Oral, Daily    torsemide (DEMADEX) 10 mg, Oral, Daily    vitamin D (VITAMIN D3) 1,000 Units, Oral, Daily    XARELTO 20 mg Tab TAKE 1 TABLET BY MOUTH DAILY with SUPPER     Current Inpatient Medications   aspirin  81 mg Oral Daily    ceFEPime (MAXIPIME) IVPB  1 g Intravenous Q8H    diltiaZEM  240 mg Per OG tube Daily    famotidine (PF)  20 mg Intravenous Daily    levETIRAcetam (Keppra) IV (PEDS and ADULTS)  1,000 mg Intravenous Q12H    losartan  50 mg Per OG tube Daily    mannitol 20%  75 g Intravenous Once    metoprolol tartrate  100 mg Per OG tube BID    vancomycin (VANCOCIN) IV (PEDS and ADULTS)  1,500 mg Intravenous Q12H     Current Intravenous Infusions   sodium chloride 0.9% Stopped (12/23/23 0927)    dexmedeTOMIDine (Precedex) infusion (titrating) Stopped (12/24/23 2324)    fentanyl 150 mcg/hr (12/25/23 0200)    NORepinephrine bitartrate-D5W Stopped (12/20/23 1929)    propofoL Stopped (12/23/23 1045)       Objective:     Intake/Output Summary (Last 24 hours) at 12/25/2023 0431  Last data filed at 12/25/2023 0200  Gross per 24 hour   Intake 1459.12 ml   Output 1690 ml   Net -230.88 ml       Vital Signs (Most Recent):  Temp: 99.8 °F (37.7 °C) (12/24/23 2330)  Pulse: 75 (12/25/23 0425)  Resp: (!) 23 (12/25/23 0425)  BP: (!)  137/91 (12/25/23 0425)  SpO2: 100 % (12/25/23 0425)  Body mass index is 34.18 kg/m².  Weight: 111.1 kg (244 lb 14.9 oz) Vital Signs (24h Range):  Temp:  [99.4 °F (37.4 °C)-100.9 °F (38.3 °C)] 99.8 °F (37.7 °C)  Pulse:  [] 75  Resp:  [10-23] 23  SpO2:  [94 %-100 %] 100 %  BP: (108-183)/() 137/91     Physical Exam  General: Intubated  HEENT: Head dressing and drain intact; pinpoint pupils, sluggish; nasal and oral mucosa moist and clear, ET tube in place  Pulm: CTA bilaterally, mechanically ventilated  CV: Irregular w/o murmurs or gallops; non-pitting edema in upper extremities, 1+ edema in BLE noted  GI: Bowel sound present in all quadrants, abdomen soft to palpation  MSK: No obvious deformities  Neuro: Limited d/t sedation    Lines/Drains/Airways       Central Venous Catheter Line  Duration             Percutaneous Central Line Insertion/Assessment - Triple Lumen  12/20/23 1056 Internal Jugular Right 4 days              Drain  Duration                  NG/OG Tube 12/20/23 0930 16 Fr. Center mouth 4 days         Urethral Catheter 12/20/23 1007 4 days              Airway  Duration                  Airway - Non-Surgical 12/20/23 0930 Endotracheal Tube 4 days              Peripheral Intravenous Line  Duration                  Peripheral IV - Single Lumen 12/19/23 1045 20 G Lateral;Left Breast 5 days         Peripheral IV - Single Lumen 12/19/23 1053 20 G Right Antecubital 5 days                  Significant Labs:  Lab Results   Component Value Date    WBC 14.32 (H) 12/24/2023    HGB 12.3 (L) 12/24/2023    HCT 38.0 (L) 12/24/2023    MCV 93.6 12/24/2023     12/24/2023       BMP  Lab Results   Component Value Date     (H) 12/24/2023    K 3.9 12/24/2023    CHLORIDE 116 (H) 12/24/2023    CO2 23 12/24/2023    BUN 16.3 12/24/2023    CREATININE 0.87 12/24/2023    CALCIUM 7.7 (L) 12/24/2023    AGAP 8.0 12/20/2023    EGFRNONAA 61 04/23/2022     ABG  Recent Labs   Lab 12/24/23  0500   PH 7.460*   PO2  101.0*   PCO2 39.0   HCO3 27.7*       Mechanical Ventilation Support:  Vent Mode: A/C (12/25/23 0425)  Set Rate: 20 BPM (12/25/23 0425)  Vt Set: 475 mL (12/25/23 0425)  PEEP/CPAP: 5 cmH20 (12/25/23 0425)  Oxygen Concentration (%): 40 (12/25/23 0425)  Peak Airway Pressure: 21 cmH20 (12/25/23 0425)  Total Ve: 11.3 L/m (12/25/23 0425)  F/VT Ratio<105 (RSBI): (!) 49.68 (12/25/23 0425)    Significant Imaging:  I have reviewed the pertinent imaging within the past 24 hours.    Assessment/Plan:     Assessment  CVA s/p right ICA and MCA M3  branch thrombectomy s/p craniectomy with hemorrhagic conversion  Leukocytosis  Acute hypoxic respiratory failure requiring intubation  Atrial fibrillation w/ RVR  Onychomycosis  HX of CAD, HTN, HLD, ADA    Plan  -Continue ICU level of care for ongoing monitoring and medical management  -Continue to wean sedation and mechanical ventilation as tolerated  -Cardiology, neurology, neurosurgery teams following, appreciate their input  -Continue Vancomycin and cefepime; blood cultures negative at 24 hours, will plan to D/C vancomycin if blood cultures remain negative at 48 hours  -Resume home meds on 12/24/2023 for better BP control and stabilization of A-fib: diltiazem 240mg daily, losartan 50mg daily, metoprolol 100mg BID    DVT Prophylaxis: SCD (No anticoagulation for 14 days due to petechial hemorrhage in brain)  GI Prophylaxis: Famotidine     32 minutes of critical care was time spent personally by me on the following activities: development of treatment plan with patient or surrogate and bedside caregivers, discussions with consultants, evaluation of patient's response to treatment, examination of patient, ordering and performing treatments and interventions, ordering and review of laboratory studies, ordering and review of radiographic studies, pulse oximetry, re-evaluation of patient's condition.  This critical care time did not overlap with that of any other provider or involve time  for any procedures.     Kenny Richter DO, PGY-II  Pulmonary Critical Care Medicine  [facility] - [facility] ICU

## 2023-12-26 LAB
ALBUMIN SERPL-MCNC: 2.4 G/DL (ref 3.4–4.8)
ALBUMIN/GLOB SERPL: 0.7 RATIO (ref 1.1–2)
ALP SERPL-CCNC: 55 UNIT/L (ref 40–150)
ALT SERPL-CCNC: 55 UNIT/L (ref 0–55)
AST SERPL-CCNC: 57 UNIT/L (ref 5–34)
BASOPHILS # BLD AUTO: 0.07 X10(3)/MCL
BASOPHILS NFR BLD AUTO: 0.5 %
BILIRUB SERPL-MCNC: 1.3 MG/DL
BUN SERPL-MCNC: 19.8 MG/DL (ref 8.4–25.7)
CALCIUM SERPL-MCNC: 8 MG/DL (ref 8.8–10)
CHLORIDE SERPL-SCNC: 115 MMOL/L (ref 98–107)
CO2 SERPL-SCNC: 24 MMOL/L (ref 23–31)
CREAT SERPL-MCNC: 0.78 MG/DL (ref 0.73–1.18)
EOSINOPHIL # BLD AUTO: 0.55 X10(3)/MCL (ref 0–0.9)
EOSINOPHIL NFR BLD AUTO: 4.2 %
ERYTHROCYTE [DISTWIDTH] IN BLOOD BY AUTOMATED COUNT: 15 % (ref 11.5–17)
GFR SERPLBLD CREATININE-BSD FMLA CKD-EPI: >60 MLS/MIN/1.73/M2
GLOBULIN SER-MCNC: 3.3 GM/DL (ref 2.4–3.5)
GLUCOSE SERPL-MCNC: 131 MG/DL (ref 82–115)
HCT VFR BLD AUTO: 38.1 % (ref 42–52)
HGB BLD-MCNC: 12.1 G/DL (ref 14–18)
IMM GRANULOCYTES # BLD AUTO: 0.32 X10(3)/MCL (ref 0–0.04)
IMM GRANULOCYTES NFR BLD AUTO: 2.5 %
LYMPHOCYTES # BLD AUTO: 1.3 X10(3)/MCL (ref 0.6–4.6)
LYMPHOCYTES NFR BLD AUTO: 10 %
MAGNESIUM SERPL-MCNC: 2.3 MG/DL (ref 1.6–2.6)
MCH RBC QN AUTO: 30.1 PG (ref 27–31)
MCHC RBC AUTO-ENTMCNC: 31.8 G/DL (ref 33–36)
MCV RBC AUTO: 94.8 FL (ref 80–94)
MONOCYTES # BLD AUTO: 1.1 X10(3)/MCL (ref 0.1–1.3)
MONOCYTES NFR BLD AUTO: 8.5 %
NEUTROPHILS # BLD AUTO: 9.61 X10(3)/MCL (ref 2.1–9.2)
NEUTROPHILS NFR BLD AUTO: 74.3 %
NRBC BLD AUTO-RTO: 0 %
OSMOLALITY SERPL: 312 MOSM/KG (ref 280–300)
OSMOLALITY SERPL: 312 MOSM/KG (ref 280–300)
PHOSPHATE SERPL-MCNC: 2.3 MG/DL (ref 2.3–4.7)
PLATELET # BLD AUTO: 144 X10(3)/MCL (ref 130–400)
PMV BLD AUTO: 10.9 FL (ref 7.4–10.4)
POCT GLUCOSE: 110 MG/DL (ref 70–110)
POCT GLUCOSE: 129 MG/DL (ref 70–110)
POCT GLUCOSE: 131 MG/DL (ref 70–110)
POCT GLUCOSE: 96 MG/DL (ref 70–110)
POTASSIUM SERPL-SCNC: 3.7 MMOL/L (ref 3.5–5.1)
PROT SERPL-MCNC: 5.7 GM/DL (ref 5.8–7.6)
RBC # BLD AUTO: 4.02 X10(6)/MCL (ref 4.7–6.1)
SODIUM SERPL-SCNC: 148 MMOL/L (ref 136–145)
VANCOMYCIN TROUGH SERPL-MCNC: 27.4 UG/ML (ref 15–20)
WBC # SPEC AUTO: 12.95 X10(3)/MCL (ref 4.5–11.5)

## 2023-12-26 PROCEDURE — 63600175 PHARM REV CODE 636 W HCPCS: Performed by: INTERNAL MEDICINE

## 2023-12-26 PROCEDURE — 27200966 HC CLOSED SUCTION SYSTEM

## 2023-12-26 PROCEDURE — 27100171 HC OXYGEN HIGH FLOW UP TO 24 HOURS

## 2023-12-26 PROCEDURE — 99900035 HC TECH TIME PER 15 MIN (STAT)

## 2023-12-26 PROCEDURE — 85025 COMPLETE CBC W/AUTO DIFF WBC: CPT

## 2023-12-26 PROCEDURE — 25000003 PHARM REV CODE 250: Performed by: NURSE PRACTITIONER

## 2023-12-26 PROCEDURE — 83930 ASSAY OF BLOOD OSMOLALITY: CPT | Performed by: NEUROLOGICAL SURGERY

## 2023-12-26 PROCEDURE — 20000000 HC ICU ROOM

## 2023-12-26 PROCEDURE — 80202 ASSAY OF VANCOMYCIN: CPT | Performed by: INTERNAL MEDICINE

## 2023-12-26 PROCEDURE — 63600175 PHARM REV CODE 636 W HCPCS

## 2023-12-26 PROCEDURE — 83735 ASSAY OF MAGNESIUM: CPT

## 2023-12-26 PROCEDURE — 25000003 PHARM REV CODE 250: Performed by: STUDENT IN AN ORGANIZED HEALTH CARE EDUCATION/TRAINING PROGRAM

## 2023-12-26 PROCEDURE — 84295 ASSAY OF SERUM SODIUM: CPT | Performed by: NEUROLOGICAL SURGERY

## 2023-12-26 PROCEDURE — 25000003 PHARM REV CODE 250

## 2023-12-26 PROCEDURE — 63600175 PHARM REV CODE 636 W HCPCS: Performed by: STUDENT IN AN ORGANIZED HEALTH CARE EDUCATION/TRAINING PROGRAM

## 2023-12-26 PROCEDURE — 80053 COMPREHEN METABOLIC PANEL: CPT

## 2023-12-26 PROCEDURE — 84100 ASSAY OF PHOSPHORUS: CPT

## 2023-12-26 PROCEDURE — 25000003 PHARM REV CODE 250: Performed by: INTERNAL MEDICINE

## 2023-12-26 PROCEDURE — 99900026 HC AIRWAY MAINTENANCE (STAT)

## 2023-12-26 PROCEDURE — 94003 VENT MGMT INPAT SUBQ DAY: CPT

## 2023-12-26 PROCEDURE — 94761 N-INVAS EAR/PLS OXIMETRY MLT: CPT

## 2023-12-26 PROCEDURE — 99024 POSTOP FOLLOW-UP VISIT: CPT | Mod: ,,, | Performed by: NEUROLOGICAL SURGERY

## 2023-12-26 RX ORDER — ENOXAPARIN SODIUM 150 MG/ML
1 INJECTION SUBCUTANEOUS EVERY 12 HOURS
Status: DISCONTINUED | OUTPATIENT
Start: 2023-12-26 | End: 2024-01-07

## 2023-12-26 RX ORDER — SENNOSIDES 8.8 MG/5ML
5 LIQUID ORAL NIGHTLY
Status: DISCONTINUED | OUTPATIENT
Start: 2023-12-26 | End: 2023-12-28

## 2023-12-26 RX ORDER — POLYETHYLENE GLYCOL 3350 17 G/17G
17 POWDER, FOR SOLUTION ORAL DAILY PRN
Status: DISCONTINUED | OUTPATIENT
Start: 2023-12-26 | End: 2024-01-05

## 2023-12-26 RX ADMIN — PROPOFOL 15 MCG/KG/MIN: 10 INJECTION, EMULSION INTRAVENOUS at 07:12

## 2023-12-26 RX ADMIN — METOPROLOL TARTRATE 100 MG: 50 TABLET, FILM COATED ORAL at 08:12

## 2023-12-26 RX ADMIN — CEFEPIME 1 G: 1 INJECTION, POWDER, FOR SOLUTION INTRAMUSCULAR; INTRAVENOUS at 02:12

## 2023-12-26 RX ADMIN — ENOXAPARIN SODIUM 120 MG: 150 INJECTION SUBCUTANEOUS at 11:12

## 2023-12-26 RX ADMIN — Medication 150 MCG/HR: at 06:12

## 2023-12-26 RX ADMIN — FAMOTIDINE 20 MG: 10 INJECTION, SOLUTION INTRAVENOUS at 08:12

## 2023-12-26 RX ADMIN — SENNOSIDES 5 ML: 8.8 LIQUID ORAL at 08:12

## 2023-12-26 RX ADMIN — ASPIRIN 81 MG CHEWABLE TABLET 81 MG: 81 TABLET CHEWABLE at 08:12

## 2023-12-26 RX ADMIN — PROPOFOL 10 MCG/KG/MIN: 10 INJECTION, EMULSION INTRAVENOUS at 02:12

## 2023-12-26 RX ADMIN — ENOXAPARIN SODIUM 120 MG: 150 INJECTION SUBCUTANEOUS at 09:12

## 2023-12-26 RX ADMIN — CEFEPIME 1 G: 1 INJECTION, POWDER, FOR SOLUTION INTRAMUSCULAR; INTRAVENOUS at 05:12

## 2023-12-26 RX ADMIN — LEVETIRACETAM INJECTION 1000 MG: 10 INJECTION INTRAVENOUS at 08:12

## 2023-12-26 RX ADMIN — LOSARTAN POTASSIUM 50 MG: 50 TABLET, FILM COATED ORAL at 08:12

## 2023-12-26 RX ADMIN — CEFEPIME 1 G: 1 INJECTION, POWDER, FOR SOLUTION INTRAMUSCULAR; INTRAVENOUS at 09:12

## 2023-12-26 RX ADMIN — VANCOMYCIN HYDROCHLORIDE 1500 MG: 1.5 INJECTION, POWDER, LYOPHILIZED, FOR SOLUTION INTRAVENOUS at 06:12

## 2023-12-26 RX ADMIN — SODIUM CHLORIDE: 9 INJECTION, SOLUTION INTRAVENOUS at 11:12

## 2023-12-26 RX ADMIN — DILTIAZEM HYDROCHLORIDE 240 MG: 60 TABLET, FILM COATED ORAL at 08:12

## 2023-12-26 RX ADMIN — Medication 200 MCG/HR: at 11:12

## 2023-12-26 NOTE — NURSING
Nurses Note -- 4 Eyes      12/26/2023   4:06 PM      Skin assessed during: Daily Assessment      [x] No Altered Skin Integrity Present    [x]Prevention Measures Documented      [] Yes- Altered Skin Integrity Present or Discovered   [] LDA Added if Not in Epic (Describe Wound)   [] New Altered Skin Integrity was Present on Admit and Documented in LDA   [] Wound Image Taken    Wound Care Consulted? No    Attending Nurse:  Vernon Johnson RN/Staff Member:   MÓNICA Jean Baptiste

## 2023-12-26 NOTE — PROGRESS NOTES
Inpatient Nutrition Assessment    Admit Date: 12/19/2023   Total duration of encounter: 7 days   Patient Age: 68 y.o.    Nutrition Recommendation/Prescription     Tube feeding recommendation:    Peptamen Intense VHP goal rate 75 ml/hr to provide  1500 kcal/d (97% est needs)  139 g protein/d (89% est needs)  1260 ml free water/d   (calculations based on estimated 20 hr/d run time)     Communication of Recommendations: reviewed with nurse    Nutrition Assessment     Malnutrition Assessment/Nutrition-Focused Physical Exam    Malnutrition Context: acute illness or injury (12/21/23 1406)  Malnutrition Level:  (does not meet criteria) (12/21/23 1406)  Energy Intake (Malnutrition):  (unable to eval) (12/21/23 1406)  Weight Loss (Malnutrition):  (unable to eval) (12/21/23 1406)  Subcutaneous Fat (Malnutrition):  (does not meet criteria) (12/21/23 1406)           Muscle Mass (Malnutrition):  (does not meet criteria) (12/21/23 1406)                          Fluid Accumulation (Malnutrition):  (does not meet criteria) (12/21/23 1406)        A minimum of two characteristics is recommended for diagnosis of either severe or non-severe malnutrition.    Chart Review    Reason Seen: continuous nutrition monitoring and follow-up    Malnutrition Screening Tool Results   Have you recently lost weight without trying?: No  Have you been eating poorly because of a decreased appetite?: No   MST Score: 0   Diagnosis:  CVA s/p right ICA and MCA M3  branch thrombectomy s/p craniectomy with hemorrhagic conversion  Acute hypoxic respiratory failure  Atrial fibrillation    Relevant Medical History: Afib, HTN, CAD, CAD (STEMI 2003), HFpEF     Scheduled Medications:  aspirin, 81 mg, Daily  ceFEPime (MAXIPIME) IVPB, 1 g, Q8H  diltiaZEM, 240 mg, Daily  docusate sodium, 50 mg, Daily  enoxparin, 1 mg/kg, Q12H (treatment, non-standard time)  famotidine (PF), 20 mg, Daily  levETIRAcetam (Keppra) IV (PEDS and ADULTS), 1,000 mg, Q12H  losartan, 50 mg,  Daily  mannitol 20%, 75 g, Once  metoprolol tartrate, 100 mg, BID    Continuous Infusions:  sodium chloride 0.9%, Last Rate: 75 mL/hr at 12/26/23 1136  dexmedeTOMIDine (Precedex) infusion (titrating), Last Rate: Stopped (12/24/23 2324)  fentanyl, Last Rate: 150 mcg/hr (12/26/23 0651)  NORepinephrine bitartrate-D5W, Last Rate: Stopped (12/20/23 1929)  propofoL, Last Rate: Stopped (12/23/23 1045)    PRN Medications: 0.9%  NaCl infusion (for blood administration), acetaminophen, dextrose 10%, dextrose 10%, fentaNYL, glucagon (human recombinant), hydrALAZINE, insulin aspart U-100, labetalol, metoprolol, ondansetron, polyethylene glycol, sodium chloride 0.9%    Calorie Containing IV Medications: no significant kcals from medications at this time    Recent Labs   Lab 12/20/23  0754 12/20/23  1209 12/23/23  0036 12/23/23  0618 12/24/23  0605 12/24/23  1250 12/25/23  0334 12/25/23  1233 12/25/23  1954 12/26/23  0712      < > 153*   < > 151*   < > 148* 147* 147* 148*  148*   K 3.8   < > 3.5  --  3.9  --  3.7  --   --  3.7   CALCIUM 8.5*   < > 8.3*  --  7.7*  --  8.5*  --   --  8.0*   PHOS 2.0*   < > 1.6*  --  2.9  --  2.7  --   --  2.3   MG 2.10   < > 2.30  --  2.20  --  2.40  --   --  2.30   CHLORIDE 105   < > 119*  --  116*  --  117*  --   --  115*   CO2 22*   < > 25  --  23  --  22*  --   --  24   BUN 10.4   < > 12.5  --  16.3  --  19.9  --   --  19.8   CREATININE 0.94   < > 1.04  --  0.87  --  0.87  --   --  0.78   EGFRNORACEVR >60   < > >60  --  >60  --  >60  --   --  >60   GLUCOSE 207*   < > 114  --  148*  --  108  --   --  131*   BILITOT 1.8*   < > 1.6*  --  1.7*  --  1.6*  --   --  1.3   ALKPHOS 62   < > 58  --  55  --  72  --   --  55   ALT 22   < > 33  --  38  --  46  --   --  55   AST 27   < > 36*  --  38*  --  55*  --   --  57*   ALBUMIN 3.9   < > 2.8*  --  2.5*  --  2.5*  --   --  2.4*   HGBA1C 5.6  --   --   --   --   --   --   --   --   --    WBC 25.65  25.65*   < > 15.44*  --  14.32*  --  15.28*  --    "--  12.95*   HGB 14.8   < > 12.6*  --  12.3*  --  12.0*  --   --  12.1*   HCT 43.8   < > 40.4*  --  38.0*  --  39.9*  --   --  38.1*    < > = values in this interval not displayed.      Nutrition Orders:  Diet NPO      Appetite/Oral Intake: NPO/not applicable  Factors Affecting Nutritional Intake: on mechanical ventilation  Food/Jehovah's witness/Cultural Preferences: unable to obtain  Food Allergies: none reported  Last Bowel Movement: 12/19/23  Wound(s):  incision noted    Comments    12/21/23: Discussed with RN. Will provide tube feeding recommendations for when appropriate to start tube feeding. Receiving kcal from meds.      12/22/23: Patient remains intubated, receiving kcal from meds. Cleviprex d/c'ed this morning, Diprivan continues. Patient currently with OG tube to LIS.     12/23/23: Pt with significant output via NG tube. Propofol infusion increased and Precedex started. Will leave TPN recommendations if Tube feeds are unable to be initiated by tomorrow.     12/26/23: TF continues, tolerated per RN. Noted no longer receiving kcal from meds. Will update goal rate to continue to meet est needs.     Anthropometrics    Height: 5' 10.98" (180.3 cm),    Last Weight: 116.1 kg (255 lb 15.3 oz) (12/26/23 0600), Weight Method: Bed Scale  BMI (Calculated): 35.7  BMI Classification: obese grade I (BMI 30-34.9)        Ideal Body Weight (IBW), Male: 171.88 lb     % Ideal Body Weight, Male (lb): 140.99 %                          Usual Weight Provided By: unable to obtain usual weight    Wt Readings from Last 5 Encounters:   12/26/23 116.1 kg (255 lb 15.3 oz)   12/11/23 112.7 kg (248 lb 7.3 oz)   12/05/23 112.3 kg (247 lb 9.6 oz)   11/07/23 109.5 kg (241 lb 6.5 oz)   10/30/23 113.9 kg (251 lb 1.7 oz)     Weight Change(s) Since Admission:   Wt Readings from Last 1 Encounters:   12/26/23 0600 116.1 kg (255 lb 15.3 oz)   12/24/23 0544 111.1 kg (244 lb 14.9 oz)   12/19/23 1645 110 kg (242 lb 8.1 oz)   12/19/23 1053 110 kg (242 lb " 8.1 oz)   Admit Weight: 110 kg (242 lb 8.1 oz) (12/19/23 1053), Weight Method: Bed Scale    Estimated Needs    Weight Used For Calorie Calculations: 110 kg (242 lb 8.1 oz)  Energy Calorie Requirements (kcal): 1210-1540kcal (11-14kcal/kg)  Energy Need Method: Kcal/kg  Weight Used For Protein Calculations: 78.2 kg (172 lb 6.4 oz) (IBW)  Protein Requirements: 157gm (2g/kg IBW)  Fluid Requirements (mL): 2750mL (25mL/kg CBW) or per MD    Enteral Nutrition     Formula: Peptamen Intense VHP  Rate/Volume: 50ml/hr  Water Flushes: 50ml q4hr  Additives/Modulars: Prosource TF20  Route: orogastric tube  Method: continuous  Total Nutrition Provided by Tube Feeding, Additives, and Flushes:  Calories Provided  1240 kcal/d, 81% needs   Protein Provided  153 g/d, 97% needs   Fluid Provided  1140 ml/d, N/A% needs   Continuous feeding calculations based on estimated 20 hr/d run time unless otherwise stated.    Parenteral Nutrition     Patient not receiving parenteral nutrition support at this time.    Evaluation of Received Nutrient Intake    Calories: not meeting estimated needs  Protein: not meeting estimated needs    Patient Education     Not applicable.    Nutrition Diagnosis     PES: Inadequate oral intake related to acute illness as evidenced by intubation since 12/19/23. (active)     Nutrition Interventions     Intervention(s): modified composition of enteral nutrition, modified composition of parenteral nutrition, and collaboration with other providers    Goal: Meet greater than 80% of nutritional needs by follow-up. (goal progressing)  Goal: Tolerate enteral feeding at goal rate by follow-up. (goal progressing)    Nutrition Goals & Monitoring     Dietitian will monitor: energy intake    Nutrition Risk/Follow-Up: high (follow-up in 1-4 days)   Please consult if re-assessment needed sooner.

## 2023-12-26 NOTE — PROGRESS NOTES
POD#6 right craniectomy for CVA due to thrombosis of right middle cerebral artery     Intubated mechanically ventilated   Only on fentanyl at this time.    Spontaneously reaching up towards ET tube with right arm.    Vital signs are stable.  Sluggishly reactive pupils.  Withdraws right upper and lower extremity.  Right arm up towards ET tube during suctioning.    +cough, corneal and gag    Incision open to air  Staples intact   Flap full but not tense.        Plan:     Bone flap precautions  Helmet is at bedside    Continue hourly neurological exams  Continue BP parameters per neurology  Keppra BID, seizure precautions  SCDs for DVT prophylaxis  Patient was started on Lovenox for superficial DVT identified.  Staple removal on 01/03/2023.  This will be placed in the EMR for continuity of care.    Post-Op Info:  Procedure(s) (LRB):  CRANIECTOMY (Right)   6 Days Post-Op      Medications:  Continuous Infusions:   sodium chloride 0.9% Stopped (12/26/23 0562)    dexmedeTOMIDine (Precedex) infusion (titrating) Stopped (12/24/23 5964)    fentanyl 150 mcg/hr (12/26/23 0651)    NORepinephrine bitartrate-D5W Stopped (12/20/23 1929)    propofoL Stopped (12/23/23 1045)     Scheduled Meds:   aspirin  81 mg Oral Daily    ceFEPime (MAXIPIME) IVPB  1 g Intravenous Q8H    diltiaZEM  240 mg Per OG tube Daily    enoxparin  1 mg/kg Subcutaneous Q12H (treatment, non-standard time)    famotidine (PF)  20 mg Intravenous Daily    levETIRAcetam (Keppra) IV (PEDS and ADULTS)  1,000 mg Intravenous Q12H    losartan  50 mg Per OG tube Daily    mannitol 20%  75 g Intravenous Once    metoprolol tartrate  100 mg Per OG tube BID     PRN Meds:0.9%  NaCl infusion (for blood administration), acetaminophen, dextrose 10%, dextrose 10%, fentaNYL, glucagon (human recombinant), hydrALAZINE, insulin aspart U-100, labetalol, metoprolol, ondansetron, sodium chloride 0.9%     Review of Systems  Objective:     Weight: 116.1 kg (255 lb 15.3 oz)  Body mass index  is 35.71 kg/m².  Vital Signs (Most Recent):  Temp: 98.5 °F (36.9 °C) (12/26/23 0800)  Pulse: 81 (12/26/23 0805)  Resp: (!) 25 (12/26/23 0805)  BP: (!) 132/91 (12/26/23 0800)  SpO2: 100 % (12/26/23 0805) Vital Signs (24h Range):  Temp:  [98.3 °F (36.8 °C)-98.9 °F (37.2 °C)] 98.5 °F (36.9 °C)  Pulse:  [60-87] 81  Resp:  [18-25] 25  SpO2:  [96 %-100 %] 100 %  BP: (119-158)/() 132/91     Date 12/26/23 0700 - 12/27/23 0659   Shift 4861-7943 2245-6618 5676-3932 24 Hour Total   INTAKE   Shift Total(mL/kg)       OUTPUT   Urine(mL/kg/hr) 75   75   Shift Total(mL/kg) 75(0.6)   75(0.6)   Weight (kg) 116.1 116.1 116.1 116.1                Vent Mode: A/C  Oxygen Concentration (%):  [40] 40  Resp Rate Total:  [20 br/min-25 br/min] 25 br/min  Vt Set:  [475 mL] 475 mL  PEEP/CPAP:  [5 cmH20] 5 cmH20  Mean Airway Pressure:  [9 thS91-52 cmH20] 11 cmH20             Closed/Suction Drain Right Scalp (Active)   Site Description Unable to view 12/23/23 0800   Dressing Type Gauze 12/23/23 0800   Dressing Status Clean;Dry;Intact 12/23/23 0800   Dressing Intervention Integrity maintained 12/23/23 0800   Drainage Serosanguineous 12/23/23 0800   Status To bulb suction 12/23/23 0800   Output (mL) 15 mL 12/22/23 1841            NG/OG Tube 12/20/23 0930 16 Fr. Center mouth (Active)   Placement Check placement verified by aspirate characteristics 12/23/23 0800   Distal Tube Length (cm) 75 12/23/23 0800   Tolerance no signs/symptoms of discomfort 12/23/23 0800   Securement secured to commercial device 12/23/23 0800   Clamp Status/Tolerance unclamped 12/23/23 0800   Suction Setting/Drainage Method suction at;low;intermittent setting 12/23/23 0800   Insertion Site Appearance no redness, warmth, tenderness, skin breakdown, drainage 12/23/23 0800   Drainage Green;Bile;Brown 12/23/23 0800   Flush/Irrigation flushed w/;water;no resistance met 12/23/23 0800   Tube Output(mL)(Include Discarded Residual) 300 mL 12/22/23 0653            Urethral  "Catheter 12/20/23 1007 (Active)   $ Fernandez Insertion Bedside Insertion Performed 12/20/23 0930   Site Assessment Clean;Intact;Dry 12/23/23 0800   Collection Container Standard drainage bag 12/23/23 0800   Securement Method secured to top of thigh w/ adhesive device 12/23/23 0800   Catheter Care Performed yes 12/23/23 0800   Reason for Continuing Urinary Catheterization Critically ill in ICU and requiring hourly monitoring of intake/output 12/23/23 0800   CAUTI Prevention Bundle Securement Device in place with 1" slack;Intact seal between catheter & drainage tubing;Drainage bag/urimeter off the floor;No dependent loops or kinks;Sheeting clip in use;Drainage bag/urimeter below bladder;Drainage bag/urimeter not overfilled (<2/3 full) 12/23/23 0800   Output (mL) 125 mL 12/23/23 0800       Neurosurgery Physical Exam    PERRLA sluggish bilaterally   Intubated sedated with fentanyl   Right arm spontaneously reaches up towards tube during suctioning.    No response from left arm today.    Minimal withdrawal to bilateral lower extremities to pain   Intact cough corneal gag    Significant Labs:  Recent Labs   Lab 12/25/23  0334 12/25/23  1233 12/25/23  1954 12/26/23  0712   * 147* 147* 148*  148*   K 3.7  --   --  3.7   CO2 22*  --   --  24   BUN 19.9  --   --  19.8   CREATININE 0.87  --   --  0.78   CALCIUM 8.5*  --   --  8.0*   MG 2.40  --   --  2.30       Recent Labs   Lab 12/25/23  0334 12/26/23  0712   WBC 15.28* 12.95*   HGB 12.0* 12.1*   HCT 39.9* 38.1*    144       No results for input(s): "LABPT", "INR", "APTT" in the last 48 hours.  Microbiology Results (last 7 days)       Procedure Component Value Units Date/Time    Blood Culture [[id number]]  (Normal) Collected: 12/23/23 1831    Order Status: Completed Specimen: Blood from Central Line Updated: 12/25/23 2000     CULTURE, BLOOD ([facility]) No Growth At 48 Hours    Blood Culture [[id number]]  (Normal) Collected: 12/23/23 1831    Order Status: Completed " Specimen: Blood from IV Site Updated: 12/25/23 2000     CULTURE, BLOOD ([facility]) No Growth At 48 Hours    Urine culture [[id number]] Collected: 12/23/23 0523    Order Status: Completed Specimen: Urine Updated: 12/25/23 0936     Urine Culture No Significant Growth    Tissue Culture - Aerobic [[id number]] Collected: 12/20/23 1752    Order Status: Completed Specimen: Bone from Skull Updated: 12/25/23 0702     CULTURE, TISSUE- AEROBIC ([facility]) No Growth at 5 days    Anaerobic Culture [[id number]] Collected: 12/20/23 1752    Order Status: Completed Specimen: Bone from Skull Updated: 12/23/23 0754     Anaerobe Culture No Anaerobes Isolated            Active Diagnoses:    Diagnosis Date Noted POA    PRINCIPAL PROBLEM:  Stroke [I63.9] 12/19/2023 Yes      Problems Resolved During this Admission:       BLAIR Bey-BC  Neurosurgery  [facility] - [facility] ICU

## 2023-12-26 NOTE — PLAN OF CARE
Problem: Adult Inpatient Plan of Care  Goal: Plan of Care Review  Outcome: Ongoing, Progressing  Goal: Patient-Specific Goal (Individualized)  Outcome: Ongoing, Progressing  Goal: Absence of Hospital-Acquired Illness or Injury  Outcome: Ongoing, Progressing  Goal: Optimal Comfort and Wellbeing  Outcome: Ongoing, Progressing  Goal: Readiness for Transition of Care  Outcome: Ongoing, Progressing     Problem: Skin Injury Risk Increased  Goal: Skin Health and Integrity  Outcome: Ongoing, Progressing     Problem: Adjustment to Illness (Stroke, Ischemic/Transient Ischemic Attack)  Goal: Optimal Coping  Outcome: Ongoing, Progressing     Problem: Bowel Elimination Impaired (Stroke, Ischemic/Transient Ischemic Attack)  Goal: Effective Bowel Elimination  Outcome: Ongoing, Progressing     Problem: Cerebral Tissue Perfusion (Stroke, Ischemic/Transient Ischemic Attack)  Goal: Optimal Cerebral Tissue Perfusion  Outcome: Ongoing, Progressing     Problem: Cognitive Impairment (Stroke, Ischemic/Transient Ischemic Attack)  Goal: Optimal Cognitive Function  Outcome: Ongoing, Progressing     Problem: Communication Impairment (Stroke, Ischemic/Transient Ischemic Attack)  Goal: Improved Communication Skills  Outcome: Ongoing, Progressing     Problem: Functional Ability Impaired (Stroke, Ischemic/Transient Ischemic Attack)  Goal: Optimal Functional Ability  Outcome: Ongoing, Progressing     Problem: Respiratory Compromise (Stroke, Ischemic/Transient Ischemic Attack)  Goal: Effective Oxygenation and Ventilation  Outcome: Ongoing, Progressing     Problem: Sensorimotor Impairment (Stroke, Ischemic/Transient Ischemic Attack)  Goal: Improved Sensorimotor Function  Outcome: Ongoing, Progressing     Problem: Swallowing Impairment (Stroke, Ischemic/Transient Ischemic Attack)  Goal: Optimal Eating and Swallowing without Aspiration  Outcome: Ongoing, Progressing     Problem: Urinary Elimination Impaired (Stroke, Ischemic/Transient Ischemic  Attack)  Goal: Effective Urinary Elimination  Outcome: Ongoing, Progressing     Problem: Fall Injury Risk  Goal: Absence of Fall and Fall-Related Injury  Outcome: Ongoing, Progressing     Problem: Infection  Goal: Absence of Infection Signs and Symptoms  Outcome: Ongoing, Progressing     Problem: Impaired Wound Healing  Goal: Optimal Wound Healing  Outcome: Ongoing, Progressing     Problem: Adult Inpatient Plan of Care  Goal: Plan of Care Review  Outcome: Ongoing, Progressing  Goal: Patient-Specific Goal (Individualized)  Outcome: Ongoing, Progressing  Goal: Absence of Hospital-Acquired Illness or Injury  Outcome: Ongoing, Progressing  Goal: Optimal Comfort and Wellbeing  Outcome: Ongoing, Progressing  Goal: Readiness for Transition of Care  Outcome: Ongoing, Progressing     Problem: Skin Injury Risk Increased  Goal: Skin Health and Integrity  Outcome: Ongoing, Progressing     Problem: Adjustment to Illness (Stroke, Ischemic/Transient Ischemic Attack)  Goal: Optimal Coping  Outcome: Ongoing, Progressing     Problem: Bowel Elimination Impaired (Stroke, Ischemic/Transient Ischemic Attack)  Goal: Effective Bowel Elimination  Outcome: Ongoing, Progressing     Problem: Cerebral Tissue Perfusion (Stroke, Ischemic/Transient Ischemic Attack)  Goal: Optimal Cerebral Tissue Perfusion  Outcome: Ongoing, Progressing     Problem: Cognitive Impairment (Stroke, Ischemic/Transient Ischemic Attack)  Goal: Optimal Cognitive Function  Outcome: Ongoing, Progressing     Problem: Communication Impairment (Stroke, Ischemic/Transient Ischemic Attack)  Goal: Improved Communication Skills  Outcome: Ongoing, Progressing     Problem: Functional Ability Impaired (Stroke, Ischemic/Transient Ischemic Attack)  Goal: Optimal Functional Ability  Outcome: Ongoing, Progressing     Problem: Respiratory Compromise (Stroke, Ischemic/Transient Ischemic Attack)  Goal: Effective Oxygenation and Ventilation  Outcome: Ongoing, Progressing     Problem:  Sensorimotor Impairment (Stroke, Ischemic/Transient Ischemic Attack)  Goal: Improved Sensorimotor Function  Outcome: Ongoing, Progressing     Problem: Swallowing Impairment (Stroke, Ischemic/Transient Ischemic Attack)  Goal: Optimal Eating and Swallowing without Aspiration  Outcome: Ongoing, Progressing     Problem: Urinary Elimination Impaired (Stroke, Ischemic/Transient Ischemic Attack)  Goal: Effective Urinary Elimination  Outcome: Ongoing, Progressing     Problem: Fall Injury Risk  Goal: Absence of Fall and Fall-Related Injury  Outcome: Ongoing, Progressing     Problem: Infection  Goal: Absence of Infection Signs and Symptoms  Outcome: Ongoing, Progressing     Problem: Impaired Wound Healing  Goal: Optimal Wound Healing  Outcome: Ongoing, Progressing

## 2023-12-26 NOTE — PROGRESS NOTES
[facility] - [facility] ICU  Pulmonary Critical Care Note    Patient Name: Delio Daniel Jr.  MRN: [id number]  Admission Date: 12/19/2023  Hospital Length of Stay: 7 days  Code Status: Full Code  Attending Provider: KODI Drake MD  Primary Care Provider: Candy, Primary Doctor     Subjective:     HPI:   Delio Daniel Jr. is a 68 y.o. 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 His last known normal was 9:15 per EMS. Stroke protocol in ED initiated. Unofficial CT head showed possible dense R MCA. Pt taken to cath lab for BRAD thrombectomy. Admitted to ICU for post-operative care and monitoring.     Hospital Course/Significant events:  12/19/2023 - Admitted to ICU s/p right internal carotid artery thrombectomy  12/20/2023 - s/p craniectomy    24 Hour Interval History:  Remains afebrile overnight. Current drips: fentanyl at 150. He is moving right arm intermittently at the time of assessment but still remains mostly minimally responsive. I/O: 810 mL urine past 24 hours, net positive 2.7 L.     Past Medical History:   Diagnosis Date    Arthritis     Atrial fibrillation     BPH (benign prostatic hyperplasia)     Cardiac arrest     Coronary artery disease     Cyst, kidney, acquired     Diverticulosis     Hyperlipidemia     Hypertension     MI (myocardial infarction)     Obesity     Steatosis of liver        Past Surgical History:   Procedure Laterality Date    A-V CARDIAC PACEMAKER INSERTION Right     CARDIAC CATHETERIZATION      COLONOSCOPY W/ BIOPSIES      CRANIECTOMY Right 12/20/2023    Procedure: CRANIECTOMY;  Surgeon: Artem Can MD;   Location: [facility] OR;  Service: Neurosurgery;  Laterality: Right;    excision of colon         Social History     Socioeconomic History    Marital status:     Number of children: 9   Occupational History    Occupation: retired   Tobacco Use    Smoking status: Former    Smokeless tobacco: Never   Substance and Sexual Activity    Alcohol use: Not Currently    Drug use: Not Currently    Sexual activity: Not Currently     Partners: Female     Social Determinants of Health     Financial Resource Strain: Low Risk  (10/19/2022)    Overall Financial Resource Strain (CARDIA)     Difficulty of Paying Living Expenses: Not hard at all   Food Insecurity: No Food Insecurity (10/19/2022)    Hunger Vital Sign     Worried About Running Out of Food in the Last Year: Never true     Ran Out of Food in the Last Year: Never true   Transportation Needs: No Transportation Needs (10/19/2022)    PRAPARE - Transportation     Lack of Transportation (Medical): No     Lack of Transportation (Non-Medical): No   Physical Activity: Sufficiently Active (10/19/2022)    Exercise Vital Sign     Days of Exercise per Week: 6 days     Minutes of Exercise per Session: 60 min   Stress: No Stress Concern Present (10/19/2022)    [employer] [place] [employer] - Occupational Stress Questionnaire     Feeling of Stress : Not at all   Social Connections: Unknown (10/19/2022)    Social Connection and Isolation Panel [NHANES]     Frequency of Communication with Friends and Family: More than three times a week     Frequency of Social Gatherings with Friends and Family: More than three times a week     Attends Zoroastrianism Services: More than 4 times per year     Active Member of Clubs or Organizations: No     Attends Club or Organization Meetings: Never   Housing Stability: Low Risk  (10/19/2022)    Housing Stability Vital Sign     Unable to Pay for Housing in the Last Year: No     Number of Places Lived in the Last Year: 1     Unstable Housing in the  Last Year: No       Current Outpatient Medications   Medication Instructions    albuterol (PROVENTIL/VENTOLIN HFA) 90 mcg/actuation inhaler 2 puffs, Inhalation, Every 6 hours PRN, Rescue    aspirin 81 MG Chew chew and swallow 1 tablet by mouth daily    atorvastatin (LIPITOR) 40 mg, Oral, Daily    cetirizine (ZYRTEC) 10 mg, Oral, Daily    diltiaZEM (CARDIZEM CD) 360 mg, Oral, Daily    losartan (COZAAR) 50 mg, Oral, Daily    metoprolol succinate (TOPROL-XL) 200 mg, Oral, 2 times daily    omeprazole (PRILOSEC) 20 mg, Oral, Daily, One tab by mouth daily    potassium chloride (KLOR-CON) 20 mEq Pack 20 mEq, Oral, Daily    spironolactone (ALDACTONE) 50 mg, Oral, Daily    torsemide (DEMADEX) 10 mg, Oral, Daily    vitamin D (VITAMIN D3) 1,000 Units, Oral, Daily    XARELTO 20 mg Tab TAKE 1 TABLET BY MOUTH DAILY with SUPPER     Current Inpatient Medications   aspirin  81 mg Oral Daily    ceFEPime (MAXIPIME) IVPB  1 g Intravenous Q8H    diltiaZEM  240 mg Per OG tube Daily    famotidine (PF)  20 mg Intravenous Daily    levETIRAcetam (Keppra) IV (PEDS and ADULTS)  1,000 mg Intravenous Q12H    losartan  50 mg Per OG tube Daily    mannitol 20%  75 g Intravenous Once    metoprolol tartrate  100 mg Per OG tube BID    vancomycin (VANCOCIN) IV (PEDS and ADULTS)  1,500 mg Intravenous Q12H     Current Intravenous Infusions   sodium chloride 0.9% 75 mL/hr at 12/26/23 0000    dexmedeTOMIDine (Precedex) infusion (titrating) Stopped (12/24/23 2324)    fentanyl 150 mcg/hr (12/26/23 0000)    NORepinephrine bitartrate-D5W Stopped (12/20/23 1929)    propofoL Stopped (12/23/23 1045)       Objective:     Intake/Output Summary (Last 24 hours) at 12/26/2023 0326  Last data filed at 12/26/2023 0000  Gross per 24 hour   Intake 2085.64 ml   Output 1160 ml   Net 925.64 ml       Vital Signs (Most Recent):  Temp: 98.3 °F (36.8 °C) (12/26/23 0000)  Pulse: 79 (12/26/23 0300)  Resp: 20 (12/26/23 0300)  BP: (!) 158/105 (12/26/23 0300)  SpO2: 97 % (12/26/23  0300)  Body mass index is 34.18 kg/m².  Weight: 111.1 kg (244 lb 14.9 oz) Vital Signs (24h Range):  Temp:  [98.3 °F (36.8 °C)-99.9 °F (37.7 °C)] 98.3 °F (36.8 °C)  Pulse:  [] 79  Resp:  [13-25] 20  SpO2:  [96 %-100 %] 97 %  BP: (119-161)/() 158/105     Physical Exam  General: Intubated  HEENT: Surgical staples intact; pinpoint pupils, sluggish; nasal and oral mucosa moist and clear, ET tube in place  Pulm: CTA bilaterally, mechanically ventilated  CV: Irregular w/o murmurs or gallops; non-pitting edema in upper extremities, 1+ edema in BLE noted  GI: Bowel sound present in all quadrants, abdomen soft to palpation  MSK: No obvious deformities  Neuro: Limited d/t sedation    Lines/Drains/Airways       Drain  Duration                  NG/OG Tube 12/20/23 0930 16 Fr. Center mouth 5 days         Urethral Catheter 12/20/23 1007 5 days              Airway  Duration                  Airway - Non-Surgical 12/20/23 0930 Endotracheal Tube 5 days              Peripheral Intravenous Line  Duration                  Peripheral IV - Single Lumen 12/19/23 1053 20 G Right Antecubital 6 days         Peripheral IV - Single Lumen 12/25/23 1705 20 G Anterior;Left;Proximal Forearm <1 day                  Significant Labs:  Lab Results   Component Value Date    WBC 15.28 (H) 12/25/2023    HGB 12.0 (L) 12/25/2023    HCT 39.9 (L) 12/25/2023    MCV 96.4 (H) 12/25/2023     12/25/2023       BMP  Lab Results   Component Value Date     (H) 12/25/2023    K 3.7 12/25/2023    CHLORIDE 117 (H) 12/25/2023    CO2 22 (L) 12/25/2023    BUN 19.9 12/25/2023    CREATININE 0.87 12/25/2023    CALCIUM 8.5 (L) 12/25/2023    AGAP 8.0 12/20/2023    EGFRNONAA 61 04/23/2022     ABG  Recent Labs   Lab 12/24/23  0500   PH 7.460*   PO2 101.0*   PCO2 39.0   HCO3 27.7*       Mechanical Ventilation Support:  Vent Mode: A/C (12/26/23 0030)  Set Rate: 20 BPM (12/26/23 0030)  Vt Set: 475 mL (12/26/23 0030)  PEEP/CPAP: 5 cmH20 (12/26/23 0030)  Oxygen  Concentration (%): 40 (12/26/23 0030)  Peak Airway Pressure: 25 cmH20 (12/26/23 0030)  Total Ve: 12.4 L/m (12/26/23 0030)  F/VT Ratio<105 (RSBI): (!) 50.51 (12/26/23 0030)    Significant Imaging:  I have reviewed the pertinent imaging within the past 24 hours.    Assessment/Plan:     Assessment  CVA s/p right ICA and MCA M3  branch thrombectomy s/p craniectomy with hemorrhagic conversion  Leukocytosis  Acute hypoxic respiratory failure requiring intubation  Atrial fibrillation w/ RVR  Onychomycosis  HX of CAD, HTN, HLD, ADA    Plan  -Continue ICU level of care for ongoing monitoring and medical management  -Likely unable to be wean off of mechanical ventilation anytime soon due to lack of neurological responses  -Continue cefepime, blood cultures negative to date; D/C vancomycin   -Cardiology, neurology, neurosurgery teams following, appreciate their input    DVT Prophylaxis: SCD (No anticoagulation for 14 days due to petechial hemorrhage in brain)  GI Prophylaxis: Famotidine     32 minutes of critical care was time spent personally by me on the following activities: development of treatment plan with patient or surrogate and bedside caregivers, discussions with consultants, evaluation of patient's response to treatment, examination of patient, ordering and performing treatments and interventions, ordering and review of laboratory studies, ordering and review of radiographic studies, pulse oximetry, re-evaluation of patient's condition.  This critical care time did not overlap with that of any other provider or involve time for any procedures.     Kenny Richter DO, PGY-II  Pulmonary Critical Care Medicine  [facility] - [facility] ICU

## 2023-12-27 LAB
ALBUMIN SERPL-MCNC: 2.1 G/DL (ref 3.4–4.8)
ALBUMIN/GLOB SERPL: 0.7 RATIO (ref 1.1–2)
ALP SERPL-CCNC: 55 UNIT/L (ref 40–150)
ALT SERPL-CCNC: 66 UNIT/L (ref 0–55)
AST SERPL-CCNC: 64 UNIT/L (ref 5–34)
BASOPHILS # BLD AUTO: 0.12 X10(3)/MCL
BASOPHILS NFR BLD AUTO: 0.8 %
BILIRUB SERPL-MCNC: 1.1 MG/DL
BUN SERPL-MCNC: 19.7 MG/DL (ref 8.4–25.7)
CALCIUM SERPL-MCNC: 7.8 MG/DL (ref 8.8–10)
CHLORIDE SERPL-SCNC: 116 MMOL/L (ref 98–107)
CO2 SERPL-SCNC: 23 MMOL/L (ref 23–31)
CREAT SERPL-MCNC: 0.83 MG/DL (ref 0.73–1.18)
EOSINOPHIL # BLD AUTO: 0.8 X10(3)/MCL (ref 0–0.9)
EOSINOPHIL NFR BLD AUTO: 5 %
ERYTHROCYTE [DISTWIDTH] IN BLOOD BY AUTOMATED COUNT: 15 % (ref 11.5–17)
GFR SERPLBLD CREATININE-BSD FMLA CKD-EPI: >60 MLS/MIN/1.73/M2
GLOBULIN SER-MCNC: 2.9 GM/DL (ref 2.4–3.5)
GLUCOSE SERPL-MCNC: 111 MG/DL (ref 82–115)
HCT VFR BLD AUTO: 37.1 % (ref 42–52)
HGB BLD-MCNC: 11.8 G/DL (ref 14–18)
IMM GRANULOCYTES # BLD AUTO: 0.53 X10(3)/MCL (ref 0–0.04)
IMM GRANULOCYTES NFR BLD AUTO: 3.3 %
LYMPHOCYTES # BLD AUTO: 1.6 X10(3)/MCL (ref 0.6–4.6)
LYMPHOCYTES NFR BLD AUTO: 10 %
MAGNESIUM SERPL-MCNC: 2.2 MG/DL (ref 1.6–2.6)
MCH RBC QN AUTO: 29.6 PG (ref 27–31)
MCHC RBC AUTO-ENTMCNC: 31.8 G/DL (ref 33–36)
MCV RBC AUTO: 93 FL (ref 80–94)
MONOCYTES # BLD AUTO: 1.4 X10(3)/MCL (ref 0.1–1.3)
MONOCYTES NFR BLD AUTO: 8.8 %
NEUTROPHILS # BLD AUTO: 11.5 X10(3)/MCL (ref 2.1–9.2)
NEUTROPHILS NFR BLD AUTO: 72.1 %
NRBC BLD AUTO-RTO: 0.3 %
PHOSPHATE SERPL-MCNC: 2.1 MG/DL (ref 2.3–4.7)
PLATELET # BLD AUTO: 156 X10(3)/MCL (ref 130–400)
PMV BLD AUTO: 11.4 FL (ref 7.4–10.4)
POCT GLUCOSE: 114 MG/DL (ref 70–110)
POCT GLUCOSE: 122 MG/DL (ref 70–110)
POCT GLUCOSE: 149 MG/DL (ref 70–110)
POTASSIUM SERPL-SCNC: 3.3 MMOL/L (ref 3.5–5.1)
PROT SERPL-MCNC: 5 GM/DL (ref 5.8–7.6)
RBC # BLD AUTO: 3.99 X10(6)/MCL (ref 4.7–6.1)
SODIUM SERPL-SCNC: 149 MMOL/L (ref 136–145)
WBC # SPEC AUTO: 15.95 X10(3)/MCL (ref 4.5–11.5)

## 2023-12-27 PROCEDURE — 25000003 PHARM REV CODE 250: Performed by: NURSE PRACTITIONER

## 2023-12-27 PROCEDURE — 85025 COMPLETE CBC W/AUTO DIFF WBC: CPT

## 2023-12-27 PROCEDURE — 25000003 PHARM REV CODE 250

## 2023-12-27 PROCEDURE — 63600175 PHARM REV CODE 636 W HCPCS

## 2023-12-27 PROCEDURE — 27200966 HC CLOSED SUCTION SYSTEM

## 2023-12-27 PROCEDURE — 25000003 PHARM REV CODE 250: Performed by: STUDENT IN AN ORGANIZED HEALTH CARE EDUCATION/TRAINING PROGRAM

## 2023-12-27 PROCEDURE — 83735 ASSAY OF MAGNESIUM: CPT

## 2023-12-27 PROCEDURE — 27100171 HC OXYGEN HIGH FLOW UP TO 24 HOURS

## 2023-12-27 PROCEDURE — 80053 COMPREHEN METABOLIC PANEL: CPT

## 2023-12-27 PROCEDURE — 99024 POSTOP FOLLOW-UP VISIT: CPT | Mod: ,,, | Performed by: NEUROLOGICAL SURGERY

## 2023-12-27 PROCEDURE — 63600175 PHARM REV CODE 636 W HCPCS: Performed by: INTERNAL MEDICINE

## 2023-12-27 PROCEDURE — 63600175 PHARM REV CODE 636 W HCPCS: Performed by: STUDENT IN AN ORGANIZED HEALTH CARE EDUCATION/TRAINING PROGRAM

## 2023-12-27 PROCEDURE — 99900035 HC TECH TIME PER 15 MIN (STAT)

## 2023-12-27 PROCEDURE — 84100 ASSAY OF PHOSPHORUS: CPT

## 2023-12-27 PROCEDURE — 20000000 HC ICU ROOM

## 2023-12-27 PROCEDURE — 94761 N-INVAS EAR/PLS OXIMETRY MLT: CPT

## 2023-12-27 PROCEDURE — 94003 VENT MGMT INPAT SUBQ DAY: CPT

## 2023-12-27 PROCEDURE — 99900026 HC AIRWAY MAINTENANCE (STAT)

## 2023-12-27 RX ADMIN — LEVETIRACETAM INJECTION 1000 MG: 10 INJECTION INTRAVENOUS at 08:12

## 2023-12-27 RX ADMIN — ENOXAPARIN SODIUM 120 MG: 150 INJECTION SUBCUTANEOUS at 10:12

## 2023-12-27 RX ADMIN — CEFEPIME 1 G: 1 INJECTION, POWDER, FOR SOLUTION INTRAMUSCULAR; INTRAVENOUS at 02:12

## 2023-12-27 RX ADMIN — POTASSIUM PHOSPHATE, MONOBASIC POTASSIUM PHOSPHATE, DIBASIC 15 MMOL: 224; 236 INJECTION, SOLUTION, CONCENTRATE INTRAVENOUS at 07:12

## 2023-12-27 RX ADMIN — FAMOTIDINE 20 MG: 10 INJECTION, SOLUTION INTRAVENOUS at 08:12

## 2023-12-27 RX ADMIN — METOPROLOL TARTRATE 100 MG: 50 TABLET, FILM COATED ORAL at 08:12

## 2023-12-27 RX ADMIN — CEFEPIME 1 G: 1 INJECTION, POWDER, FOR SOLUTION INTRAMUSCULAR; INTRAVENOUS at 05:12

## 2023-12-27 RX ADMIN — SODIUM CHLORIDE: 9 INJECTION, SOLUTION INTRAVENOUS at 04:12

## 2023-12-27 RX ADMIN — DILTIAZEM HYDROCHLORIDE 240 MG: 60 TABLET, FILM COATED ORAL at 08:12

## 2023-12-27 RX ADMIN — PROPOFOL 20 MCG/KG/MIN: 10 INJECTION, EMULSION INTRAVENOUS at 03:12

## 2023-12-27 RX ADMIN — SENNOSIDES 5 ML: 8.8 LIQUID ORAL at 08:12

## 2023-12-27 RX ADMIN — PROPOFOL 20 MCG/KG/MIN: 10 INJECTION, EMULSION INTRAVENOUS at 07:12

## 2023-12-27 RX ADMIN — ASPIRIN 81 MG CHEWABLE TABLET 81 MG: 81 TABLET CHEWABLE at 08:12

## 2023-12-27 RX ADMIN — LOSARTAN POTASSIUM 50 MG: 50 TABLET, FILM COATED ORAL at 08:12

## 2023-12-27 RX ADMIN — CEFEPIME 1 G: 1 INJECTION, POWDER, FOR SOLUTION INTRAMUSCULAR; INTRAVENOUS at 10:12

## 2023-12-27 RX ADMIN — Medication 175 MCG/HR: at 11:12

## 2023-12-27 NOTE — PROGRESS NOTES
[facility] - [facility] ICU  Pulmonary Critical Care Note    Patient Name: Delio Daniel Jr.  MRN: [id number]  Admission Date: 12/19/2023  Hospital Length of Stay: 8 days  Code Status: Full Code  Attending Provider: KODI Drake MD  Primary Care Provider: Candy, Primary Doctor     Subjective:     HPI:   Delio Daniel Jr. is a 68 y.o. 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 His last known normal was 9:15 per EMS. Stroke protocol in ED initiated. Unofficial CT head showed possible dense R MCA. Pt taken to cath lab for BRAD thrombectomy. Admitted to ICU for post-operative care and monitoring.     Hospital Course/Significant events:  12/19/2023 - Admitted to ICU s/p right internal carotid artery thrombectomy  12/20/2023 - s/p craniectomy    24 Hour Interval History:  Remains afebrile overnight. Current drips: fentanyl at 200. He continues to move right arm intermittently but still remains mostly minimally responsive. Labs this AM: WBC increased to 15.95k, H/H stable, Na+ 149, K+ 3.3, phos 2.1, corrected calcium 9.3. I/O: 735 mL urine past 24 hours, net positive 4 L. Repleting with [facility].     Past Medical History:   Diagnosis Date    Arthritis     Atrial fibrillation     BPH (benign prostatic hyperplasia)     Cardiac arrest     Coronary artery disease     Cyst, kidney, acquired     Diverticulosis     Hyperlipidemia     Hypertension     MI (myocardial infarction)     Obesity     Steatosis of liver        Past Surgical History:   Procedure Laterality Date    A-V CARDIAC PACEMAKER INSERTION Right     CARDIAC CATHETERIZATION      COLONOSCOPY W/  BIOPSIES      CRANIECTOMY Right 12/20/2023    Procedure: CRANIECTOMY;  Surgeon: Artem Can MD;  Location: [facility] OR;  Service: Neurosurgery;  Laterality: Right;    excision of colon         Social History     Socioeconomic History    Marital status:     Number of children: 9   Occupational History    Occupation: retired   Tobacco Use    Smoking status: Former    Smokeless tobacco: Never   Substance and Sexual Activity    Alcohol use: Not Currently    Drug use: Not Currently    Sexual activity: Not Currently     Partners: Female     Social Determinants of Health     Financial Resource Strain: Low Risk  (10/19/2022)    Overall Financial Resource Strain (CARDIA)     Difficulty of Paying Living Expenses: Not hard at all   Food Insecurity: No Food Insecurity (10/19/2022)    Hunger Vital Sign     Worried About Running Out of Food in the Last Year: Never true     Ran Out of Food in the Last Year: Never true   Transportation Needs: No Transportation Needs (10/19/2022)    PRAPARE - Transportation     Lack of Transportation (Medical): No     Lack of Transportation (Non-Medical): No   Physical Activity: Sufficiently Active (10/19/2022)    Exercise Vital Sign     Days of Exercise per Week: 6 days     Minutes of Exercise per Session: 60 min   Stress: No Stress Concern Present (10/19/2022)    [employer] [place] [employer] - Occupational Stress Questionnaire     Feeling of Stress : Not at all   Social Connections: Unknown (10/19/2022)    Social Connection and Isolation Panel [NHANES]     Frequency of Communication with Friends and Family: More than three times a week     Frequency of Social Gatherings with Friends and Family: More than three times a week     Attends Roman Catholic Services: More than 4 times per year     Active Member of Clubs or Organizations: No     Attends Club or Organization Meetings: Never   Housing Stability: Low Risk  (10/19/2022)    Housing Stability Vital Sign     Unable to Pay for  Housing in the Last Year: No     Number of Places Lived in the Last Year: 1     Unstable Housing in the Last Year: No       Current Outpatient Medications   Medication Instructions    albuterol (PROVENTIL/VENTOLIN HFA) 90 mcg/actuation inhaler 2 puffs, Inhalation, Every 6 hours PRN, Rescue    aspirin 81 MG Chew chew and swallow 1 tablet by mouth daily    atorvastatin (LIPITOR) 40 mg, Oral, Daily    cetirizine (ZYRTEC) 10 mg, Oral, Daily    diltiaZEM (CARDIZEM CD) 360 mg, Oral, Daily    losartan (COZAAR) 50 mg, Oral, Daily    metoprolol succinate (TOPROL-XL) 200 mg, Oral, 2 times daily    omeprazole (PRILOSEC) 20 mg, Oral, Daily, One tab by mouth daily    potassium chloride (KLOR-CON) 20 mEq Pack 20 mEq, Oral, Daily    spironolactone (ALDACTONE) 50 mg, Oral, Daily    torsemide (DEMADEX) 10 mg, Oral, Daily    vitamin D (VITAMIN D3) 1,000 Units, Oral, Daily    XARELTO 20 mg Tab TAKE 1 TABLET BY MOUTH DAILY with SUPPER     Current Inpatient Medications   aspirin  81 mg Oral Daily    ceFEPime (MAXIPIME) IVPB  1 g Intravenous Q8H    diltiaZEM  240 mg Per OG tube Daily    enoxparin  1 mg/kg Subcutaneous Q12H (treatment, non-standard time)    famotidine (PF)  20 mg Intravenous Daily    levETIRAcetam (Keppra) IV (PEDS and ADULTS)  1,000 mg Intravenous Q12H    losartan  50 mg Per OG tube Daily    mannitol 20%  75 g Intravenous Once    metoprolol tartrate  100 mg Per OG tube BID    sennosides 8.8 mg/5 ml  5 mL Oral QHS     Current Intravenous Infusions   sodium chloride 0.9% 75 mL/hr at 12/27/23 0000    dexmedeTOMIDine (Precedex) infusion (titrating) Stopped (12/24/23 2324)    fentanyl 200 mcg/hr (12/27/23 0000)    NORepinephrine bitartrate-D5W Stopped (12/20/23 1929)    propofoL 15 mcg/kg/min (12/27/23 0000)       Objective:     Intake/Output Summary (Last 24 hours) at 12/27/2023 0035  Last data filed at 12/27/2023 0000  Gross per 24 hour   Intake 3392.27 ml   Output 1060 ml   Net 2332.27 ml       Vital Signs (Most  Recent):  Temp: 99.8 °F (37.7 °C) (12/27/23 0000)  Pulse: 83 (12/27/23 0000)  Resp: 20 (12/27/23 0000)  BP: 133/89 (12/27/23 0000)  SpO2: 100 % (Simultaneous filing. User may not have seen previous data.) (12/27/23 0000)  Body mass index is 35.71 kg/m².  Weight: 116.1 kg (255 lb 15.3 oz) Vital Signs (24h Range):  Temp:  [98.3 °F (36.8 °C)-99.8 °F (37.7 °C)] 99.8 °F (37.7 °C)  Pulse:  [] 83  Resp:  [16-27] 20  SpO2:  [94 %-100 %] 100 %  BP: ()/() 133/89     Physical Exam  General: Intubated  HEENT: Surgical staples intact; pinpoint pupils, sluggish; nasal and oral mucosa moist and clear, ET tube in place  Pulm: CTA bilaterally, mechanically ventilated  CV: Irregular w/o murmurs or gallops; non-pitting edema in upper extremities, 1+ edema in BLE noted  GI: Bowel sound present in all quadrants, abdomen soft to palpation  MSK: No obvious deformities  Neuro: Moving right arm intermittently, minimally responsive    Lines/Drains/Airways       Drain  Duration                  NG/OG Tube 12/20/23 0930 16 Fr. Center mouth 6 days         Urethral Catheter 12/20/23 1007 6 days              Airway  Duration                  Airway - Non-Surgical 12/20/23 0930 Endotracheal Tube 6 days              Peripheral Intravenous Line  Duration                  Peripheral IV - Single Lumen 12/19/23 1053 20 G Right Antecubital 7 days         Peripheral IV - Single Lumen 12/25/23 1705 20 G Anterior;Left;Proximal Forearm 1 day                  Significant Labs:  Lab Results   Component Value Date    WBC 12.95 (H) 12/26/2023    HGB 12.1 (L) 12/26/2023    HCT 38.1 (L) 12/26/2023    MCV 94.8 (H) 12/26/2023     12/26/2023       BMP  Lab Results   Component Value Date     (H) 12/26/2023    K 3.7 12/26/2023    CHLORIDE 115 (H) 12/26/2023    CO2 24 12/26/2023    BUN 19.8 12/26/2023    CREATININE 0.78 12/26/2023    CALCIUM 8.0 (L) 12/26/2023    AGAP 8.0 12/20/2023    EGFRNONAA 61 04/23/2022     ABG  Recent Labs   Lab  12/24/23  0500   PH 7.460*   PO2 101.0*   PCO2 39.0   HCO3 27.7*       Mechanical Ventilation Support:  Vent Mode: A/C (12/27/23 0000)  Set Rate: 20 BPM (12/27/23 0000)  Vt Set: 475 mL (12/27/23 0000)  PEEP/CPAP: 5 cmH20 (12/27/23 0000)  Oxygen Concentration (%): 40 (12/27/23 0000)  Peak Airway Pressure: 22 cmH20 (12/27/23 0000)  Total Ve: 8.9 L/m (12/27/23 0000)  F/VT Ratio<105 (RSBI): (!) 45.56 (12/27/23 0000)    Significant Imaging:  I have reviewed the pertinent imaging within the past 24 hours.    Assessment/Plan:     Assessment  CVA s/p right ICA and MCA M3  branch thrombectomy s/p craniectomy with hemorrhagic conversion  Superficial thrombosis in left cephalic vein  Confirmed by DVT U/S on 12/26/2023  Acute hypoxic respiratory failure requiring intubation  Atrial fibrillation w/ RVR  Onychomycosis  HX of CAD, HTN, HLD, ADA    Plan  -Continue ICU level of care for ongoing monitoring and medical management  -Likely unable to be wean off of mechanical ventilation anytime soon due to lack of neurological responses  -Started full-dose lovenox on 12/26/2023 for superficial thrombosis in left cephalic vein  -Continue cefepime, blood cultures negative to date  -Cardiology, neurology, neurosurgery teams following, appreciate their input    DVT Prophylaxis: FD lovenox  GI Prophylaxis: Famotidine     32 minutes of critical care was time spent personally by me on the following activities: development of treatment plan with patient or surrogate and bedside caregivers, discussions with consultants, evaluation of patient's response to treatment, examination of patient, ordering and performing treatments and interventions, ordering and review of laboratory studies, ordering and review of radiographic studies, pulse oximetry, re-evaluation of patient's condition.  This critical care time did not overlap with that of any other provider or involve time for any procedures.     Kenny Richter DO, PGY-II  Pulmonary Critical Care  Medicine  [facility] - [address] [place] [address]

## 2023-12-27 NOTE — PLAN OF CARE
Problem: Adult Inpatient Plan of Care  Goal: Plan of Care Review  Outcome: Ongoing, Progressing  Goal: Patient-Specific Goal (Individualized)  Outcome: Ongoing, Progressing  Goal: Absence of Hospital-Acquired Illness or Injury  Outcome: Ongoing, Progressing  Goal: Optimal Comfort and Wellbeing  Outcome: Ongoing, Progressing  Goal: Readiness for Transition of Care  Outcome: Ongoing, Progressing     Problem: Skin Injury Risk Increased  Goal: Skin Health and Integrity  Outcome: Ongoing, Progressing     Problem: Adjustment to Illness (Stroke, Ischemic/Transient Ischemic Attack)  Goal: Optimal Coping  Outcome: Ongoing, Progressing     Problem: Bowel Elimination Impaired (Stroke, Ischemic/Transient Ischemic Attack)  Goal: Effective Bowel Elimination  Outcome: Ongoing, Progressing     Problem: Cerebral Tissue Perfusion (Stroke, Ischemic/Transient Ischemic Attack)  Goal: Optimal Cerebral Tissue Perfusion  Outcome: Ongoing, Progressing     Problem: Cognitive Impairment (Stroke, Ischemic/Transient Ischemic Attack)  Goal: Optimal Cognitive Function  Outcome: Ongoing, Progressing     Problem: Communication Impairment (Stroke, Ischemic/Transient Ischemic Attack)  Goal: Improved Communication Skills  Outcome: Ongoing, Progressing     Problem: Functional Ability Impaired (Stroke, Ischemic/Transient Ischemic Attack)  Goal: Optimal Functional Ability  Outcome: Ongoing, Progressing     Problem: Respiratory Compromise (Stroke, Ischemic/Transient Ischemic Attack)  Goal: Effective Oxygenation and Ventilation  Outcome: Ongoing, Progressing     Problem: Sensorimotor Impairment (Stroke, Ischemic/Transient Ischemic Attack)  Goal: Improved Sensorimotor Function  Outcome: Ongoing, Progressing     Problem: Swallowing Impairment (Stroke, Ischemic/Transient Ischemic Attack)  Goal: Optimal Eating and Swallowing without Aspiration  Outcome: Ongoing, Progressing     Problem: Urinary Elimination Impaired (Stroke, Ischemic/Transient Ischemic  Attack)  Goal: Effective Urinary Elimination  Outcome: Ongoing, Progressing     Problem: Fall Injury Risk  Goal: Absence of Fall and Fall-Related Injury  Outcome: Ongoing, Progressing     Problem: Infection  Goal: Absence of Infection Signs and Symptoms  Outcome: Ongoing, Progressing     Problem: Impaired Wound Healing  Goal: Optimal Wound Healing  Outcome: Ongoing, Progressing

## 2023-12-27 NOTE — CARE UPDATE
[id number] According to MD note, no change in neurologic standpoint. Pt will need trache and peg. CM to follow

## 2023-12-27 NOTE — NURSING
Nurses Note -- 4 Eyes      12/27/2023   5:39 PM      Skin assessed during: Daily Assessment      [x] No Altered Skin Integrity Present    [x]Prevention Measures Documented      [] Yes- Altered Skin Integrity Present or Discovered   [] LDA Added if Not in Epic (Describe Wound)   [] New Altered Skin Integrity was Present on Admit and Documented in LDA   [] Wound Image Taken    Wound Care Consulted? No    Attending Nurse:  Vernon Johnson RN/Staff Member:   MÓNICA Jean Baptiste

## 2023-12-27 NOTE — PROGRESS NOTES
POD#7 right craniectomy for CVA due to thrombosis of right middle cerebral artery     Intubated mechanically ventilated   Yesterday with respiratory issues and bouts of coughing and tachypnea.    Currently on fentanyl and propofol due to respiratory issues.      Vital signs are stable.  No change neurologically.  Sluggishly reactive pupils.  Right upper extremity with spontaneous movement reaches up towards ET tube at times when sedation is lowered.  Occasional withdraw bilateral lower extremities.  +cough, corneal and gag    Incision open to air  Staples intact   Flap full but not tense.        Plan:     Bone flap precautions  Helmet is at bedside    Continue hourly neurological exams  Continue BP parameters per neurology  Keppra BID, seizure precautions  SCDs for DVT prophylaxis  Patient was started on Lovenox for superficial DVT identified.  Staple removal on 01/03/2023.  This will be placed in the EMR for continuity of care.    Goals of care to be discussed with family.    Patient will likely need trach and PEG and placement.  Patient's daughter we will speak with the rest of family regarding the direction they wish to go in.     Post-Op Info:  Procedure(s) (LRB):  CRANIECTOMY (Right)   7 Days Post-Op      Medications:  Continuous Infusions:   sodium chloride 0.9% Stopped (12/27/23 0549)    dexmedeTOMIDine (Precedex) infusion (titrating) Stopped (12/24/23 2324)    fentanyl 200 mcg/hr (12/27/23 0600)    NORepinephrine bitartrate-D5W Stopped (12/20/23 1929)    propofoL 20 mcg/kg/min (12/27/23 0741)     Scheduled Meds:   aspirin  81 mg Oral Daily    ceFEPime (MAXIPIME) IVPB  1 g Intravenous Q8H    diltiaZEM  240 mg Per OG tube Daily    enoxparin  1 mg/kg Subcutaneous Q12H (treatment, non-standard time)    famotidine (PF)  20 mg Intravenous Daily    levETIRAcetam (Keppra) IV (PEDS and ADULTS)  1,000 mg Intravenous Q12H    losartan  50 mg Per OG tube Daily    mannitol 20%  75 g Intravenous Once    metoprolol tartrate   100 mg Per OG tube BID    potassium phosphate IVPB  15 mmol Intravenous Once    sennosides 8.8 mg/5 ml  5 mL Oral QHS     PRN Meds:0.9%  NaCl infusion (for blood administration), acetaminophen, dextrose 10%, dextrose 10%, fentaNYL, glucagon (human recombinant), hydrALAZINE, insulin aspart U-100, labetalol, metoprolol, ondansetron, polyethylene glycol, sodium chloride 0.9%     Review of Systems  Objective:     Weight: 116.9 kg (257 lb 11.5 oz)  Body mass index is 35.96 kg/m².  Vital Signs (Most Recent):  Temp: 98 °F (36.7 °C) (12/27/23 0400)  Pulse: 93 (12/27/23 0600)  Resp: 19 (12/27/23 0600)  BP: 128/82 (12/27/23 0600)  SpO2: 100 % (12/27/23 0600) Vital Signs (24h Range):  Temp:  [98 °F (36.7 °C)-99.8 °F (37.7 °C)] 98 °F (36.7 °C)  Pulse:  [] 93  Resp:  [17-27] 19  SpO2:  [94 %-100 %] 100 %  BP: (104-167)/() 128/82                  Vent Mode: A/C  Oxygen Concentration (%):  [40-50] 40  Resp Rate Total:  [20 br/min-27 br/min] 26 br/min  Vt Set:  [475 mL] 475 mL  PEEP/CPAP:  [5 cmH20] 5 cmH20  Mean Airway Pressure:  [7 gpK11-71 cmH20] 10 cmH20             Closed/Suction Drain Right Scalp (Active)   Site Description Unable to view 12/23/23 0800   Dressing Type Gauze 12/23/23 0800   Dressing Status Clean;Dry;Intact 12/23/23 0800   Dressing Intervention Integrity maintained 12/23/23 0800   Drainage Serosanguineous 12/23/23 0800   Status To bulb suction 12/23/23 0800   Output (mL) 15 mL 12/22/23 1841            NG/OG Tube 12/20/23 0930 16 Fr. Center mouth (Active)   Placement Check placement verified by aspirate characteristics 12/23/23 0800   Distal Tube Length (cm) 75 12/23/23 0800   Tolerance no signs/symptoms of discomfort 12/23/23 0800   Securement secured to commercial device 12/23/23 0800   Clamp Status/Tolerance unclamped 12/23/23 0800   Suction Setting/Drainage Method suction at;low;intermittent setting 12/23/23 0800   Insertion Site Appearance no redness, warmth, tenderness, skin breakdown,  "drainage 12/23/23 0800   Drainage Green;Bile;Brown 12/23/23 0800   Flush/Irrigation flushed w/;water;no resistance met 12/23/23 0800   Tube Output(mL)(Include Discarded Residual) 300 mL 12/22/23 0653            Urethral Catheter 12/20/23 1007 (Active)   $ Fernandez Insertion Bedside Insertion Performed 12/20/23 0930   Site Assessment Clean;Intact;Dry 12/23/23 0800   Collection Container Standard drainage bag 12/23/23 0800   Securement Method secured to top of thigh w/ adhesive device 12/23/23 0800   Catheter Care Performed yes 12/23/23 0800   Reason for Continuing Urinary Catheterization Critically ill in ICU and requiring hourly monitoring of intake/output 12/23/23 0800   CAUTI Prevention Bundle Securement Device in place with 1" slack;Intact seal between catheter & drainage tubing;Drainage bag/urimeter off the floor;No dependent loops or kinks;Sheeting clip in use;Drainage bag/urimeter below bladder;Drainage bag/urimeter not overfilled (<2/3 full) 12/23/23 0800   Output (mL) 125 mL 12/23/23 0800       Neurosurgery Physical Exam    Sedation effect at this time.  When off sedation-  PERRLA sluggish bilaterally   Intubated sedated with fentanyl and propofol.  Right arm spontaneously reaches up towards tube during suctioning.    No response from left arm today.    Minimal withdrawal to bilateral lower extremities to pain   Intact cough corneal gag    Significant Labs:  Recent Labs   Lab 12/26/23 0712 12/26/23 2007 12/27/23 0220   *  148* 148* 149*   K 3.7  --  3.3*   CO2 24  --  23   BUN 19.8  --  19.7   CREATININE 0.78  --  0.83   CALCIUM 8.0*  --  7.8*   MG 2.30  --  2.20       Recent Labs   Lab 12/26/23 0712 12/27/23 0220   WBC 12.95* 15.95*   HGB 12.1* 11.8*   HCT 38.1* 37.1*    156       No results for input(s): "LABPT", "INR", "APTT" in the last 48 hours.  Microbiology Results (last 7 days)       Procedure Component Value Units Date/Time    Blood Culture [[id number]]  (Normal) Collected: 12/23/23 " 1831    Order Status: Completed Specimen: Blood from Central Line Updated: 12/26/23 2001     CULTURE, BLOOD ([facility]) No Growth At 72 Hours    Blood Culture [[id number]]  (Normal) Collected: 12/23/23 1831    Order Status: Completed Specimen: Blood from IV Site Updated: 12/26/23 2001     CULTURE, BLOOD ([facility]) No Growth At 72 Hours    Urine culture [[id number]] Collected: 12/23/23 0523    Order Status: Completed Specimen: Urine Updated: 12/25/23 0936     Urine Culture No Significant Growth    Tissue Culture - Aerobic [[id number]] Collected: 12/20/23 1752    Order Status: Completed Specimen: Bone from Skull Updated: 12/25/23 0702     CULTURE, TISSUE- AEROBIC ([facility]) No Growth at 5 days    Anaerobic Culture [[id number]] Collected: 12/20/23 1752    Order Status: Completed Specimen: Bone from Skull Updated: 12/23/23 0754     Anaerobe Culture No Anaerobes Isolated            Active Diagnoses:    Diagnosis Date Noted POA    PRINCIPAL PROBLEM:  Stroke [I63.9] 12/19/2023 Yes      Problems Resolved During this Admission:       LEONARDO Bey-BC  Neurosurgery  [facility] - [facility]

## 2023-12-28 LAB
ALBUMIN SERPL-MCNC: 1.9 G/DL (ref 3.4–4.8)
ALBUMIN/GLOB SERPL: 0.5 RATIO (ref 1.1–2)
ALP SERPL-CCNC: 54 UNIT/L (ref 40–150)
ALT SERPL-CCNC: 60 UNIT/L (ref 0–55)
AST SERPL-CCNC: 52 UNIT/L (ref 5–34)
BACTERIA BLD CULT: NORMAL
BACTERIA BLD CULT: NORMAL
BASOPHILS # BLD AUTO: 0.07 X10(3)/MCL
BASOPHILS NFR BLD AUTO: 0.5 %
BILIRUB SERPL-MCNC: 0.9 MG/DL
BUN SERPL-MCNC: 20.6 MG/DL (ref 8.4–25.7)
CALCIUM SERPL-MCNC: 8 MG/DL (ref 8.8–10)
CHLORIDE SERPL-SCNC: 115 MMOL/L (ref 98–107)
CO2 SERPL-SCNC: 24 MMOL/L (ref 23–31)
CREAT SERPL-MCNC: 0.94 MG/DL (ref 0.73–1.18)
EOSINOPHIL # BLD AUTO: 0.69 X10(3)/MCL (ref 0–0.9)
EOSINOPHIL NFR BLD AUTO: 4.6 %
ERYTHROCYTE [DISTWIDTH] IN BLOOD BY AUTOMATED COUNT: 15.1 % (ref 11.5–17)
GFR SERPLBLD CREATININE-BSD FMLA CKD-EPI: >60 MLS/MIN/1.73/M2
GLOBULIN SER-MCNC: 3.5 GM/DL (ref 2.4–3.5)
GLUCOSE SERPL-MCNC: 111 MG/DL (ref 82–115)
HCT VFR BLD AUTO: 34.7 % (ref 42–52)
HGB BLD-MCNC: 10.9 G/DL (ref 14–18)
IMM GRANULOCYTES # BLD AUTO: 0.42 X10(3)/MCL (ref 0–0.04)
IMM GRANULOCYTES NFR BLD AUTO: 2.8 %
LYMPHOCYTES # BLD AUTO: 1.58 X10(3)/MCL (ref 0.6–4.6)
LYMPHOCYTES NFR BLD AUTO: 10.5 %
MAGNESIUM SERPL-MCNC: 2.1 MG/DL (ref 1.6–2.6)
MCH RBC QN AUTO: 29.3 PG (ref 27–31)
MCHC RBC AUTO-ENTMCNC: 31.4 G/DL (ref 33–36)
MCV RBC AUTO: 93.3 FL (ref 80–94)
MONOCYTES # BLD AUTO: 1.01 X10(3)/MCL (ref 0.1–1.3)
MONOCYTES NFR BLD AUTO: 6.7 %
NEUTROPHILS # BLD AUTO: 11.27 X10(3)/MCL (ref 2.1–9.2)
NEUTROPHILS NFR BLD AUTO: 74.9 %
NRBC BLD AUTO-RTO: 0.5 %
PHOSPHATE SERPL-MCNC: 2.8 MG/DL (ref 2.3–4.7)
PLATELET # BLD AUTO: 179 X10(3)/MCL (ref 130–400)
PMV BLD AUTO: 10.8 FL (ref 7.4–10.4)
POCT GLUCOSE: 106 MG/DL (ref 70–110)
POCT GLUCOSE: 106 MG/DL (ref 70–110)
POCT GLUCOSE: 112 MG/DL (ref 70–110)
POCT GLUCOSE: 81 MG/DL (ref 70–110)
POTASSIUM SERPL-SCNC: 3.3 MMOL/L (ref 3.5–5.1)
PROT SERPL-MCNC: 5.4 GM/DL (ref 5.8–7.6)
RBC # BLD AUTO: 3.72 X10(6)/MCL (ref 4.7–6.1)
SODIUM SERPL-SCNC: 148 MMOL/L (ref 136–145)
WBC # SPEC AUTO: 15.04 X10(3)/MCL (ref 4.5–11.5)

## 2023-12-28 PROCEDURE — 25000003 PHARM REV CODE 250

## 2023-12-28 PROCEDURE — 99900026 HC AIRWAY MAINTENANCE (STAT)

## 2023-12-28 PROCEDURE — 84100 ASSAY OF PHOSPHORUS: CPT

## 2023-12-28 PROCEDURE — 20000000 HC ICU ROOM

## 2023-12-28 PROCEDURE — 94761 N-INVAS EAR/PLS OXIMETRY MLT: CPT

## 2023-12-28 PROCEDURE — 99024 POSTOP FOLLOW-UP VISIT: CPT | Mod: ,,, | Performed by: NEUROLOGICAL SURGERY

## 2023-12-28 PROCEDURE — 80053 COMPREHEN METABOLIC PANEL: CPT

## 2023-12-28 PROCEDURE — 27100171 HC OXYGEN HIGH FLOW UP TO 24 HOURS

## 2023-12-28 PROCEDURE — 99900035 HC TECH TIME PER 15 MIN (STAT)

## 2023-12-28 PROCEDURE — 25000003 PHARM REV CODE 250: Performed by: STUDENT IN AN ORGANIZED HEALTH CARE EDUCATION/TRAINING PROGRAM

## 2023-12-28 PROCEDURE — 63600175 PHARM REV CODE 636 W HCPCS: Performed by: INTERNAL MEDICINE

## 2023-12-28 PROCEDURE — 63600175 PHARM REV CODE 636 W HCPCS

## 2023-12-28 PROCEDURE — 85025 COMPLETE CBC W/AUTO DIFF WBC: CPT

## 2023-12-28 PROCEDURE — 63600175 PHARM REV CODE 636 W HCPCS: Performed by: STUDENT IN AN ORGANIZED HEALTH CARE EDUCATION/TRAINING PROGRAM

## 2023-12-28 PROCEDURE — 25000003 PHARM REV CODE 250: Performed by: INTERNAL MEDICINE

## 2023-12-28 PROCEDURE — 94003 VENT MGMT INPAT SUBQ DAY: CPT

## 2023-12-28 PROCEDURE — 25000003 PHARM REV CODE 250: Performed by: NURSE PRACTITIONER

## 2023-12-28 PROCEDURE — 83735 ASSAY OF MAGNESIUM: CPT

## 2023-12-28 RX ORDER — ACETAMINOPHEN 650 MG/20.3ML
650 LIQUID ORAL EVERY 4 HOURS PRN
Status: DISCONTINUED | OUTPATIENT
Start: 2023-12-28 | End: 2024-01-05

## 2023-12-28 RX ORDER — NAPROXEN SODIUM 220 MG/1
81 TABLET, FILM COATED ORAL DAILY
Status: DISCONTINUED | OUTPATIENT
Start: 2023-12-29 | End: 2024-01-05

## 2023-12-28 RX ORDER — SENNOSIDES 8.8 MG/5ML
5 LIQUID ORAL NIGHTLY
Status: DISCONTINUED | OUTPATIENT
Start: 2023-12-28 | End: 2023-12-31

## 2023-12-28 RX ADMIN — PROPOFOL 10 MCG/KG/MIN: 10 INJECTION, EMULSION INTRAVENOUS at 05:12

## 2023-12-28 RX ADMIN — ASPIRIN 81 MG CHEWABLE TABLET 81 MG: 81 TABLET CHEWABLE at 08:12

## 2023-12-28 RX ADMIN — CEFEPIME 1 G: 1 INJECTION, POWDER, FOR SOLUTION INTRAMUSCULAR; INTRAVENOUS at 02:12

## 2023-12-28 RX ADMIN — POTASSIUM BICARBONATE 40 MEQ: 391 TABLET, EFFERVESCENT ORAL at 04:12

## 2023-12-28 RX ADMIN — SENNOSIDES 5 ML: 8.8 LIQUID ORAL at 08:12

## 2023-12-28 RX ADMIN — DEXMEDETOMIDINE HYDROCHLORIDE 0.4 MCG/KG/HR: 4 INJECTION INTRAVENOUS at 05:12

## 2023-12-28 RX ADMIN — LOSARTAN POTASSIUM 50 MG: 50 TABLET, FILM COATED ORAL at 08:12

## 2023-12-28 RX ADMIN — FAMOTIDINE 20 MG: 10 INJECTION, SOLUTION INTRAVENOUS at 08:12

## 2023-12-28 RX ADMIN — DILTIAZEM HYDROCHLORIDE 240 MG: 60 TABLET, FILM COATED ORAL at 08:12

## 2023-12-28 RX ADMIN — CEFEPIME 1 G: 1 INJECTION, POWDER, FOR SOLUTION INTRAMUSCULAR; INTRAVENOUS at 10:12

## 2023-12-28 RX ADMIN — METOPROLOL TARTRATE 100 MG: 50 TABLET, FILM COATED ORAL at 08:12

## 2023-12-28 RX ADMIN — DEXMEDETOMIDINE HYDROCHLORIDE 0.2 MCG/KG/HR: 4 INJECTION INTRAVENOUS at 12:12

## 2023-12-28 RX ADMIN — SODIUM CHLORIDE: 9 INJECTION, SOLUTION INTRAVENOUS at 08:12

## 2023-12-28 RX ADMIN — LEVETIRACETAM INJECTION 1000 MG: 10 INJECTION INTRAVENOUS at 08:12

## 2023-12-28 RX ADMIN — HYDRALAZINE HYDROCHLORIDE 10 MG: 20 INJECTION INTRAMUSCULAR; INTRAVENOUS at 08:12

## 2023-12-28 RX ADMIN — Medication 200 MCG/HR: at 12:12

## 2023-12-28 RX ADMIN — CEFEPIME 1 G: 1 INJECTION, POWDER, FOR SOLUTION INTRAMUSCULAR; INTRAVENOUS at 05:12

## 2023-12-28 NOTE — PROGRESS NOTES
[facility]  Pulmonary Critical Care Note    Patient Name: Delio Daniel Jr.  MRN: [id number]  Admission Date: 12/19/2023  Hospital Length of Stay: 9 days  Code Status: Full Code  Attending Provider: KODI Drake MD  Primary Care Provider: Candy, Primary Doctor     Subjective:     HPI:   Delio Daniel Jr. is a 68 y.o. 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 His last known normal was 9:15 per EMS. Stroke protocol in ED initiated. Unofficial CT head showed possible dense R MCA. Pt taken to cath lab for BRAD thrombectomy. Admitted to ICU for post-operative care and monitoring.     Hospital Course/Significant events:  12/19/2023 - Admitted to ICU s/p right internal carotid artery thrombectomy  12/20/2023 - s/p craniectomy    24 Hour Interval History:  Patient remains sedated on propofol and fentanyl.  No significant change from neurologic standpoint.  Intake yesterday 3658 output 1580.  Stable hemodynamics.  Continues to have hiccups.    Past Medical History:   Diagnosis Date    Arthritis     Atrial fibrillation     BPH (benign prostatic hyperplasia)     Cardiac arrest     Coronary artery disease     Cyst, kidney, acquired     Diverticulosis     Hyperlipidemia     Hypertension     MI (myocardial infarction)     Obesity     Steatosis of liver        Past Surgical History:   Procedure Laterality Date    A-V CARDIAC PACEMAKER INSERTION Right     CARDIAC CATHETERIZATION      COLONOSCOPY W/ BIOPSIES      CRANIECTOMY Right 12/20/2023    Procedure: CRANIECTOMY;  Surgeon: Artem Can MD;  Location: [facility];  Service: Neurosurgery;   Laterality: Right;    excision of colon         Social History     Socioeconomic History    Marital status:     Number of children: 9   Occupational History    Occupation: retired   Tobacco Use    Smoking status: Former    Smokeless tobacco: Never   Substance and Sexual Activity    Alcohol use: Not Currently    Drug use: Not Currently    Sexual activity: Not Currently     Partners: Female     Social Determinants of Health     Financial Resource Strain: Low Risk  (10/19/2022)    Overall Financial Resource Strain (CARDIA)     Difficulty of Paying Living Expenses: Not hard at all   Food Insecurity: No Food Insecurity (10/19/2022)    Hunger Vital Sign     Worried About Running Out of Food in the Last Year: Never true     Ran Out of Food in the Last Year: Never true   Transportation Needs: No Transportation Needs (10/19/2022)    PRAPARE - Transportation     Lack of Transportation (Medical): No     Lack of Transportation (Non-Medical): No   Physical Activity: Sufficiently Active (10/19/2022)    Exercise Vital Sign     Days of Exercise per Week: 6 days     Minutes of Exercise per Session: 60 min   Stress: No Stress Concern Present (10/19/2022)    [employer] [place] [employer] - Occupational Stress Questionnaire     Feeling of Stress : Not at all   Social Connections: Unknown (10/19/2022)    Social Connection and Isolation Panel [NHANES]     Frequency of Communication with Friends and Family: More than three times a week     Frequency of Social Gatherings with Friends and Family: More than three times a week     Attends [employer] Services: More than 4 times per year     Active Member of Clubs or Organizations: No     Attends Club or Organization Meetings: Never   Housing Stability: Low Risk  (10/19/2022)    Housing Stability Vital Sign     Unable to Pay for Housing in the Last Year: No     Number of Places Lived in the Last Year: 1     Unstable Housing in the Last Year: No           Current Outpatient  Medications   Medication Instructions    albuterol (PROVENTIL/VENTOLIN HFA) 90 mcg/actuation inhaler 2 puffs, Inhalation, Every 6 hours PRN, Rescue    aspirin 81 MG Chew chew and swallow 1 tablet by mouth daily    atorvastatin (LIPITOR) 40 mg, Oral, Daily    cetirizine (ZYRTEC) 10 mg, Oral, Daily    diltiaZEM (CARDIZEM CD) 360 mg, Oral, Daily    losartan (COZAAR) 50 mg, Oral, Daily    metoprolol succinate (TOPROL-XL) 200 mg, Oral, 2 times daily    omeprazole (PRILOSEC) 20 mg, Oral, Daily, One tab by mouth daily    potassium chloride (KLOR-CON) 20 mEq Pack 20 mEq, Oral, Daily    spironolactone (ALDACTONE) 50 mg, Oral, Daily    torsemide (DEMADEX) 10 mg, Oral, Daily    vitamin D (VITAMIN D3) 1,000 Units, Oral, Daily    XARELTO 20 mg Tab TAKE 1 TABLET BY MOUTH DAILY with SUPPER       Current Inpatient Medications   aspirin  81 mg Oral Daily    ceFEPime (MAXIPIME) IVPB  1 g Intravenous Q8H    diltiaZEM  240 mg Per OG tube Daily    enoxparin  1 mg/kg Subcutaneous Q12H (treatment, non-standard time)    famotidine (PF)  20 mg Intravenous Daily    levETIRAcetam (Keppra) IV (PEDS and ADULTS)  1,000 mg Intravenous Q12H    losartan  50 mg Per OG tube Daily    mannitol 20%  75 g Intravenous Once    metoprolol tartrate  100 mg Per OG tube BID    sennosides 8.8 mg/5 ml  5 mL Oral QHS       Current Intravenous Infusions   sodium chloride 0.9% Stopped (12/28/23 0554)    dexmedeTOMIDine (Precedex) infusion (titrating) Stopped (12/24/23 2324)    fentanyl 200 mcg/hr (12/28/23 0600)    NORepinephrine bitartrate-D5W Stopped (12/20/23 1929)         Review of Systems   Unable to perform ROS: Critical illness          Objective:       Intake/Output Summary (Last 24 hours) at 12/28/2023 0718  Last data filed at 12/28/2023 0600  Gross per 24 hour   Intake 3658.14 ml   Output 1580 ml   Net 2078.14 ml         Vital Signs (Most Recent):  Temp: 99.2 °F (37.3 °C) (12/28/23 0400)  Pulse: 86 (12/28/23 0715)  Resp: (!) 30 (12/28/23 0715)  BP: 125/83  (12/28/23 0700)  SpO2: 100 % (12/28/23 0715)  Body mass index is 35.96 kg/m².  Weight: 116.9 kg (257 lb 11.5 oz) Vital Signs (24h Range):  Temp:  [98.4 °F (36.9 °C)-99.2 °F (37.3 °C)] 99.2 °F (37.3 °C)  Pulse:  [55-93] 86  Resp:  [0-34] 30  SpO2:  [73 %-100 %] 100 %  BP: ()/() 125/83     Physical Exam  General: Intubated  HEENT: Surgical staples intact; pinpoint pupils, sluggish; nasal and oral mucosa moist and clear, ET tube in place  Pulm: CTA bilaterally, mechanically ventilated  CV: Irregular w/o murmurs or gallops; non-pitting edema in upper extremities, 1+ edema in BLE noted  GI: Bowel sound present in all quadrants, abdomen soft to palpation  MSK: No obvious deformities  Neuro: Moving right arm intermittently, minimally responsive    Lines/Drains/Airways       Drain  Duration                  NG/OG Tube 12/20/23 0930 16 Fr. Center mouth 7 days         Urethral Catheter 12/20/23 1007 7 days              Airway  Duration                  Airway - Non-Surgical 12/20/23 0930 Endotracheal Tube 7 days              Peripheral Intravenous Line  Duration                  Peripheral IV - Single Lumen 12/19/23 1053 20 G Right Antecubital 8 days         Peripheral IV - Single Lumen 12/25/23 1705 20 G Anterior;Left;Proximal Forearm 2 days                    Significant Labs:    Lab Results   Component Value Date    WBC 15.04 (H) 12/28/2023    HGB 10.9 (L) 12/28/2023    HCT 34.7 (L) 12/28/2023    MCV 93.3 12/28/2023     12/28/2023           BMP  Lab Results   Component Value Date     (H) 12/28/2023    K 3.3 (L) 12/28/2023    CHLORIDE 115 (H) 12/28/2023    CO2 24 12/28/2023    BUN 20.6 12/28/2023    CREATININE 0.94 12/28/2023    CALCIUM 8.0 (L) 12/28/2023    AGAP 8.0 12/20/2023    EGFRNONAA 61 04/23/2022         ABG  Recent Labs   Lab 12/24/23  0500   PH 7.460*   PO2 101.0*   PCO2 39.0   HCO3 27.7*   POCBASEDEF 3.70*       Mechanical Ventilation Support:  Vent Mode: A/C (12/28/23 0400)  Set Rate: 20  BPM (12/28/23 0400)  Vt Set: 475 mL (12/28/23 0400)  PEEP/CPAP: 5 cmH20 (12/28/23 0400)  Oxygen Concentration (%): 50 (12/28/23 0400)  Peak Airway Pressure: 23 cmH20 (12/28/23 0400)  Total Ve: 12.9 L/m (12/28/23 0400)  F/VT Ratio<105 (RSBI): (!) 74.89 (12/28/23 0400)      Significant Imaging:  I have reviewed the pertinent imaging within the past 24 hours.        Assessment/Plan:     Assessment  CVA s/p right ICA and MCA M3  branch thrombectomy s/p craniectomy with hemorrhagic conversion  Superficial thrombosis in left cephalic vein  Confirmed by DVT U/S on 12/26/2023  Acute hypoxic respiratory failure requiring intubation  Atrial fibrillation w/ RVR-rate now controlled.  Onychomycosis  HX of CAD, HTN, HLD, ADA      Plan  Await family decision regarding tracheostomy and PEG tube.  Continue mechanical ventilation for now.  Discontinue propofol and wean fentanyl as tolerated.  Can use Precedex if needed.  Appreciate the input of all consultants.  Remains on full-dose Lovenox for left cephalic vein thrombosis    DVT Prophylaxis:  Lovenox  GI Prophylaxis:  Famotidine     35 minutes of critical care was time spent personally by me on the following activities: development of treatment plan with patient or surrogate and bedside caregivers, discussions with consultants, evaluation of patient's response to treatment, examination of patient, ordering and performing treatments and interventions, ordering and review of laboratory studies, ordering and review of radiographic studies, pulse oximetry, re-evaluation of patient's condition.  This critical care time did not overlap with that of any other provider or involve time for any procedures.     ROBB Drake MD  Pulmonary Critical Care Medicine  [facility]  DOS: 12/28/2023

## 2023-12-28 NOTE — NURSING
Nurses Note -- 4 Eyes      12/28/2023   4:09 PM      Skin assessed during: Daily Assessment      [] No Altered Skin Integrity Present    [x]Prevention Measures Documented      [x] Yes- Altered Skin Integrity Present or Discovered   [] LDA Added if Not in Epic (Describe Wound)   [] New Altered Skin Integrity was Present on Admit and Documented in LDA   [] Wound Image Taken    Wound Care Consulted? No    Attending Nurse:  Taylor Johnson RN/Staff Member:   MÓNICA Cedeno

## 2023-12-29 ENCOUNTER — ANESTHESIA (OUTPATIENT)
Dept: SURGERY | Facility: HOSPITAL | Age: 68
DRG: 003 | End: 2023-12-29
Payer: MEDICARE

## 2023-12-29 ENCOUNTER — ANESTHESIA EVENT (OUTPATIENT)
Dept: SURGERY | Facility: HOSPITAL | Age: 68
DRG: 003 | End: 2023-12-29
Payer: MEDICARE

## 2023-12-29 LAB
ALBUMIN SERPL-MCNC: 1.9 G/DL (ref 3.4–4.8)
ALBUMIN/GLOB SERPL: 0.5 RATIO (ref 1.1–2)
ALP SERPL-CCNC: 52 UNIT/L (ref 40–150)
ALT SERPL-CCNC: 57 UNIT/L (ref 0–55)
AST SERPL-CCNC: 49 UNIT/L (ref 5–34)
BASOPHILS # BLD AUTO: 0.04 X10(3)/MCL
BASOPHILS NFR BLD AUTO: 0.4 %
BILIRUB SERPL-MCNC: 0.8 MG/DL
BUN SERPL-MCNC: 17.2 MG/DL (ref 8.4–25.7)
CALCIUM SERPL-MCNC: 8 MG/DL (ref 8.8–10)
CHLORIDE SERPL-SCNC: 114 MMOL/L (ref 98–107)
CO2 SERPL-SCNC: 25 MMOL/L (ref 23–31)
CREAT SERPL-MCNC: 0.91 MG/DL (ref 0.73–1.18)
EOSINOPHIL # BLD AUTO: 0.48 X10(3)/MCL (ref 0–0.9)
EOSINOPHIL NFR BLD AUTO: 4.3 %
ERYTHROCYTE [DISTWIDTH] IN BLOOD BY AUTOMATED COUNT: 15.3 % (ref 11.5–17)
GFR SERPLBLD CREATININE-BSD FMLA CKD-EPI: >60 MLS/MIN/1.73/M2
GLOBULIN SER-MCNC: 3.6 GM/DL (ref 2.4–3.5)
GLUCOSE SERPL-MCNC: 107 MG/DL (ref 82–115)
HCT VFR BLD AUTO: 31.7 % (ref 42–52)
HGB BLD-MCNC: 10.2 G/DL (ref 14–18)
IMM GRANULOCYTES # BLD AUTO: 0.25 X10(3)/MCL (ref 0–0.04)
IMM GRANULOCYTES NFR BLD AUTO: 2.2 %
LYMPHOCYTES # BLD AUTO: 0.9 X10(3)/MCL (ref 0.6–4.6)
LYMPHOCYTES NFR BLD AUTO: 8 %
MAGNESIUM SERPL-MCNC: 2.1 MG/DL (ref 1.6–2.6)
MCH RBC QN AUTO: 29.1 PG (ref 27–31)
MCHC RBC AUTO-ENTMCNC: 32.2 G/DL (ref 33–36)
MCV RBC AUTO: 90.6 FL (ref 80–94)
MONOCYTES # BLD AUTO: 0.7 X10(3)/MCL (ref 0.1–1.3)
MONOCYTES NFR BLD AUTO: 6.2 %
NEUTROPHILS # BLD AUTO: 8.92 X10(3)/MCL (ref 2.1–9.2)
NEUTROPHILS NFR BLD AUTO: 78.9 %
NRBC BLD AUTO-RTO: 0.4 %
PHOSPHATE SERPL-MCNC: 2.7 MG/DL (ref 2.3–4.7)
PLATELET # BLD AUTO: 204 X10(3)/MCL (ref 130–400)
PMV BLD AUTO: 11.1 FL (ref 7.4–10.4)
POCT GLUCOSE: 155 MG/DL (ref 70–110)
POCT GLUCOSE: 88 MG/DL (ref 70–110)
POTASSIUM SERPL-SCNC: 3.4 MMOL/L (ref 3.5–5.1)
PROT SERPL-MCNC: 5.5 GM/DL (ref 5.8–7.6)
RBC # BLD AUTO: 3.5 X10(6)/MCL (ref 4.7–6.1)
SODIUM SERPL-SCNC: 146 MMOL/L (ref 136–145)
WBC # SPEC AUTO: 11.29 X10(3)/MCL (ref 4.5–11.5)

## 2023-12-29 PROCEDURE — 0B110Z4 BYPASS TRACHEA TO CUTANEOUS, OPEN APPROACH: ICD-10-PCS | Performed by: OTOLARYNGOLOGY

## 2023-12-29 PROCEDURE — 85025 COMPLETE CBC W/AUTO DIFF WBC: CPT

## 2023-12-29 PROCEDURE — D9220A PRA ANESTHESIA: ICD-10-PCS | Mod: ANES,,, | Performed by: ANESTHESIOLOGY

## 2023-12-29 PROCEDURE — 63600175 PHARM REV CODE 636 W HCPCS: Performed by: STUDENT IN AN ORGANIZED HEALTH CARE EDUCATION/TRAINING PROGRAM

## 2023-12-29 PROCEDURE — 27800903 OPTIME MED/SURG SUP & DEVICES OTHER IMPLANTS: Performed by: OTOLARYNGOLOGY

## 2023-12-29 PROCEDURE — D9220A PRA ANESTHESIA: Mod: CRNA,,, | Performed by: NURSE ANESTHETIST, CERTIFIED REGISTERED

## 2023-12-29 PROCEDURE — 83735 ASSAY OF MAGNESIUM: CPT

## 2023-12-29 PROCEDURE — 63600175 PHARM REV CODE 636 W HCPCS

## 2023-12-29 PROCEDURE — 27201423 OPTIME MED/SURG SUP & DEVICES STERILE SUPPLY: Performed by: OTOLARYNGOLOGY

## 2023-12-29 PROCEDURE — 63600175 PHARM REV CODE 636 W HCPCS: Performed by: INTERNAL MEDICINE

## 2023-12-29 PROCEDURE — 20000000 HC ICU ROOM

## 2023-12-29 PROCEDURE — 25000003 PHARM REV CODE 250

## 2023-12-29 PROCEDURE — 36000706: Performed by: OTOLARYNGOLOGY

## 2023-12-29 PROCEDURE — 25000003 PHARM REV CODE 250: Performed by: OTOLARYNGOLOGY

## 2023-12-29 PROCEDURE — 99900035 HC TECH TIME PER 15 MIN (STAT)

## 2023-12-29 PROCEDURE — 99024 POSTOP FOLLOW-UP VISIT: CPT | Mod: ,,, | Performed by: NEUROLOGICAL SURGERY

## 2023-12-29 PROCEDURE — D9220A PRA ANESTHESIA: Mod: ANES,,, | Performed by: ANESTHESIOLOGY

## 2023-12-29 PROCEDURE — 25000003 PHARM REV CODE 250: Performed by: STUDENT IN AN ORGANIZED HEALTH CARE EDUCATION/TRAINING PROGRAM

## 2023-12-29 PROCEDURE — 99900026 HC AIRWAY MAINTENANCE (STAT)

## 2023-12-29 PROCEDURE — 80053 COMPREHEN METABOLIC PANEL: CPT

## 2023-12-29 PROCEDURE — 25000003 PHARM REV CODE 250: Performed by: INTERNAL MEDICINE

## 2023-12-29 PROCEDURE — 94003 VENT MGMT INPAT SUBQ DAY: CPT

## 2023-12-29 PROCEDURE — 37000009 HC ANESTHESIA EA ADD 15 MINS: Performed by: OTOLARYNGOLOGY

## 2023-12-29 PROCEDURE — D9220A PRA ANESTHESIA: ICD-10-PCS | Mod: CRNA,,, | Performed by: NURSE ANESTHETIST, CERTIFIED REGISTERED

## 2023-12-29 PROCEDURE — 84100 ASSAY OF PHOSPHORUS: CPT

## 2023-12-29 PROCEDURE — 94761 N-INVAS EAR/PLS OXIMETRY MLT: CPT

## 2023-12-29 PROCEDURE — 37000008 HC ANESTHESIA 1ST 15 MINUTES: Performed by: OTOLARYNGOLOGY

## 2023-12-29 PROCEDURE — 27100171 HC OXYGEN HIGH FLOW UP TO 24 HOURS

## 2023-12-29 PROCEDURE — 27200966 HC CLOSED SUCTION SYSTEM

## 2023-12-29 PROCEDURE — 36000707: Performed by: OTOLARYNGOLOGY

## 2023-12-29 RX ORDER — PHENYLEPHRINE HCL IN 0.9% NACL 1 MG/10 ML
SYRINGE (ML) INTRAVENOUS
Status: DISCONTINUED | OUTPATIENT
Start: 2023-12-29 | End: 2023-12-29

## 2023-12-29 RX ORDER — GLYCOPYRROLATE 0.2 MG/ML
INJECTION INTRAMUSCULAR; INTRAVENOUS
Status: DISCONTINUED | OUTPATIENT
Start: 2023-12-29 | End: 2023-12-29

## 2023-12-29 RX ORDER — MIDAZOLAM HYDROCHLORIDE 1 MG/ML
INJECTION INTRAMUSCULAR; INTRAVENOUS
Status: DISCONTINUED | OUTPATIENT
Start: 2023-12-29 | End: 2023-12-29

## 2023-12-29 RX ORDER — FENTANYL CITRATE 50 UG/ML
INJECTION, SOLUTION INTRAMUSCULAR; INTRAVENOUS
Status: DISCONTINUED | OUTPATIENT
Start: 2023-12-29 | End: 2023-12-29

## 2023-12-29 RX ORDER — DEXAMETHASONE SODIUM PHOSPHATE 4 MG/ML
INJECTION, SOLUTION INTRA-ARTICULAR; INTRALESIONAL; INTRAMUSCULAR; INTRAVENOUS; SOFT TISSUE
Status: DISCONTINUED | OUTPATIENT
Start: 2023-12-29 | End: 2023-12-29

## 2023-12-29 RX ORDER — ONDANSETRON HYDROCHLORIDE 2 MG/ML
INJECTION, SOLUTION INTRAMUSCULAR; INTRAVENOUS
Status: DISCONTINUED | OUTPATIENT
Start: 2023-12-29 | End: 2023-12-29

## 2023-12-29 RX ORDER — ROCURONIUM BROMIDE 10 MG/ML
INJECTION, SOLUTION INTRAVENOUS
Status: DISCONTINUED | OUTPATIENT
Start: 2023-12-29 | End: 2023-12-29

## 2023-12-29 RX ORDER — LIDOCAINE HYDROCHLORIDE 20 MG/ML
INJECTION, SOLUTION EPIDURAL; INFILTRATION; INTRACAUDAL; PERINEURAL
Status: DISCONTINUED | OUTPATIENT
Start: 2023-12-29 | End: 2023-12-29

## 2023-12-29 RX ORDER — LIDOCAINE HYDROCHLORIDE AND EPINEPHRINE 10; 10 MG/ML; UG/ML
INJECTION, SOLUTION INFILTRATION; PERINEURAL
Status: DISCONTINUED | OUTPATIENT
Start: 2023-12-29 | End: 2023-12-29 | Stop reason: HOSPADM

## 2023-12-29 RX ADMIN — GLYCOPYRROLATE 0.2 MG: 0.2 INJECTION INTRAMUSCULAR; INTRAVENOUS at 12:12

## 2023-12-29 RX ADMIN — FENTANYL CITRATE 50 MCG: 50 INJECTION, SOLUTION INTRAMUSCULAR; INTRAVENOUS at 12:12

## 2023-12-29 RX ADMIN — LIDOCAINE HYDROCHLORIDE 80 MG: 20 INJECTION, SOLUTION EPIDURAL; INFILTRATION; INTRACAUDAL; PERINEURAL at 12:12

## 2023-12-29 RX ADMIN — MIDAZOLAM HYDROCHLORIDE 2 MG: 1 INJECTION, SOLUTION INTRAMUSCULAR; INTRAVENOUS at 12:12

## 2023-12-29 RX ADMIN — FENTANYL CITRATE 50 MCG: 50 INJECTION, SOLUTION INTRAMUSCULAR; INTRAVENOUS at 01:12

## 2023-12-29 RX ADMIN — ROCURONIUM BROMIDE 50 MG: 10 SOLUTION INTRAVENOUS at 12:12

## 2023-12-29 RX ADMIN — LEVETIRACETAM INJECTION 1000 MG: 10 INJECTION INTRAVENOUS at 10:12

## 2023-12-29 RX ADMIN — SODIUM CHLORIDE, SODIUM GLUCONATE, SODIUM ACETATE, POTASSIUM CHLORIDE AND MAGNESIUM CHLORIDE: 526; 502; 368; 37; 30 INJECTION, SOLUTION INTRAVENOUS at 12:12

## 2023-12-29 RX ADMIN — DEXMEDETOMIDINE HYDROCHLORIDE 0.2 MCG/KG/HR: 4 INJECTION INTRAVENOUS at 08:12

## 2023-12-29 RX ADMIN — FAMOTIDINE 20 MG: 10 INJECTION, SOLUTION INTRAVENOUS at 08:12

## 2023-12-29 RX ADMIN — SODIUM CHLORIDE: 9 INJECTION, SOLUTION INTRAVENOUS at 02:12

## 2023-12-29 RX ADMIN — Medication 150 MCG/HR: at 03:12

## 2023-12-29 RX ADMIN — DILTIAZEM HYDROCHLORIDE 240 MG: 60 TABLET, FILM COATED ORAL at 08:12

## 2023-12-29 RX ADMIN — SENNOSIDES 5 ML: 8.8 LIQUID ORAL at 08:12

## 2023-12-29 RX ADMIN — Medication 125 MCG/HR: at 11:12

## 2023-12-29 RX ADMIN — Medication 100 MCG: at 12:12

## 2023-12-29 RX ADMIN — ONDANSETRON 4 MG: 2 INJECTION INTRAMUSCULAR; INTRAVENOUS at 12:12

## 2023-12-29 RX ADMIN — LEVETIRACETAM INJECTION 1000 MG: 10 INJECTION INTRAVENOUS at 08:12

## 2023-12-29 RX ADMIN — METOPROLOL TARTRATE 100 MG: 50 TABLET, FILM COATED ORAL at 09:12

## 2023-12-29 RX ADMIN — LOSARTAN POTASSIUM 50 MG: 50 TABLET, FILM COATED ORAL at 08:12

## 2023-12-29 RX ADMIN — METOPROLOL TARTRATE 100 MG: 50 TABLET, FILM COATED ORAL at 08:12

## 2023-12-29 RX ADMIN — ASPIRIN 81 MG CHEWABLE TABLET 81 MG: 81 TABLET CHEWABLE at 08:12

## 2023-12-29 RX ADMIN — DEXAMETHASONE SODIUM PHOSPHATE 4 MG: 4 INJECTION, SOLUTION INTRA-ARTICULAR; INTRALESIONAL; INTRAMUSCULAR; INTRAVENOUS; SOFT TISSUE at 12:12

## 2023-12-29 RX ADMIN — ENOXAPARIN SODIUM 120 MG: 150 INJECTION SUBCUTANEOUS at 09:12

## 2023-12-29 RX ADMIN — CEFEPIME 1 G: 1 INJECTION, POWDER, FOR SOLUTION INTRAMUSCULAR; INTRAVENOUS at 05:12

## 2023-12-29 NOTE — OP NOTE
[facility]  Operative Note    SUMMARY     Date of Procedure: 12/29/2023     Procedure: Tracheostomy    Surgeon(s) and Role:     * Patricia Winslow MD - Primary    Pre-Operative Diagnosis: Respiratory failure    Post-Operative Diagnosis: Respiratory failure    Anesthesia: General    Operative Findings (including complications, if any): Difficult tracheostomy due to patient's habitus with short neck and deep airway, and calcification of tracheal rings    Description of Technical Procedures: After appropriate consents were obtained, the patient was taken to the operating suite and laid in a supine position. General endotracheal anesthesia was performed through patient's existing tracheostomy tube. A shoulder roll was placed, and planned incision was marked between the sternal notch and cricoid cartilage. It was injected with 2 cc of 0.5% Lidocaine with [id number] Epinephrine. The area was prepped and draped in a sterile fashion. Incision was made with a 15 blade knife. Subcutaneous fat was removed with the Bovie. Strap muscles were divided in the midline. The cricoid cartilage was identified and elevated with cricoid hook. The thyroid isthmus was divided with Bovie cautery and edges further cauterized with the Bipolar. The anterior tracheal wall was exposed. 15 blade was used to make tracheostomy in the 2nd and 3rd interspace. Megan flap was created with heavy curved Tirado scissors. There was calcification of the tracheal rings. A 6 cuffed proximal XLT trach tube was placed and the cuff inflated. End tidal CO2 was confirmed. Surgicel was placed into the wound bed which was hemostatic. The trach was sutured in place with 3-0 Silk suture and secured with Velcro trach ties. He was turned back to anesthesia, transferred ack to the Intensive Care Unit where post-operative chest X-Ray was ordered.    Estimated Blood Loss (EBL): 10 cc                  Condition: Good    Disposition: PACU - hemodynamically  stable.

## 2023-12-29 NOTE — PROGRESS NOTES
[facility] - [facility]  Pulmonary Critical Care Note    Patient Name: Delio Daniel Jr.  MRN: [id number]  Admission Date: 12/19/2023  Hospital Length of Stay: 10 days  Code Status: Full Code  Attending Provider: KODI Drake MD  Primary Care Provider: Candy, Primary Doctor     Subjective:     HPI:   Delio Daniel Jr. is a 68 y.o. 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 His last known normal was 9:15 per EMS. Stroke protocol in ED initiated. Unofficial CT head showed possible dense R MCA. Pt taken to cath lab for BRAD thrombectomy. Admitted to ICU for post-operative care and monitoring.     Hospital Course/Significant events:  12/19/2023 - Admitted to ICU s/p right internal carotid artery thrombectomy  12/20/2023 - s/p craniectomy    24 Hour Interval History:  Patient remains intubated and sedated on precedex and fentanyl.  No significant change from neurologic standpoint.  Patient is planning to go to the OR today for trach.  Vent settings are at rate of 20, FiO2 of 30 and PEEP of 5.  Urine output has been 960 cc over the last 24 hours.  Hemodynamically stable.    Past Medical History:   Diagnosis Date    Arthritis     Atrial fibrillation     BPH (benign prostatic hyperplasia)     Cardiac arrest     Coronary artery disease     Cyst, kidney, acquired     Diverticulosis     Hyperlipidemia     Hypertension     MI (myocardial infarction)     Obesity     Steatosis of liver        Past Surgical History:   Procedure Laterality Date    A-V CARDIAC PACEMAKER INSERTION Right     CARDIAC CATHETERIZATION      COLONOSCOPY W/ BIOPSIES      CRANIECTOMY  Right 12/20/2023    Procedure: CRANIECTOMY;  Surgeon: Artem Can MD;  Location: [facility] OR;  Service: Neurosurgery;  Laterality: Right;    excision of colon         Social History     Socioeconomic History    Marital status:     Number of children: 9   Occupational History    Occupation: retired   Tobacco Use    Smoking status: Former    Smokeless tobacco: Never   Substance and Sexual Activity    Alcohol use: Not Currently    Drug use: Not Currently    Sexual activity: Not Currently     Partners: Female     Social Determinants of Health     Financial Resource Strain: Low Risk  (10/19/2022)    Overall Financial Resource Strain (CARDIA)     Difficulty of Paying Living Expenses: Not hard at all   Food Insecurity: No Food Insecurity (10/19/2022)    Hunger Vital Sign     Worried About Running Out of Food in the Last Year: Never true     Ran Out of Food in the Last Year: Never true   Transportation Needs: No Transportation Needs (10/19/2022)    PRAPARE - Transportation     Lack of Transportation (Medical): No     Lack of Transportation (Non-Medical): No   Physical Activity: Sufficiently Active (10/19/2022)    Exercise Vital Sign     Days of Exercise per Week: 6 days     Minutes of Exercise per Session: 60 min   Stress: No Stress Concern Present (10/19/2022)    [employer] - Occupational Stress Questionnaire     Feeling of Stress : Not at all   Social Connections: Unknown (10/19/2022)    Social Connection and Isolation Panel [NHANES]     Frequency of Communication with Friends and Family: More than three times a week     Frequency of Social Gatherings with Friends and Family: More than three times a week     Attends Taoist Services: More than 4 times per year     Active Member of Clubs or Organizations: No     Attends Club or Organization Meetings: Never   Housing Stability: Low Risk  (10/19/2022)    Housing Stability Vital Sign     Unable to Pay for Housing in the Last Year: No      Number of Places Lived in the Last Year: 1     Unstable Housing in the Last Year: No           Current Outpatient Medications   Medication Instructions    albuterol (PROVENTIL/VENTOLIN HFA) 90 mcg/actuation inhaler 2 puffs, Inhalation, Every 6 hours PRN, Rescue    aspirin 81 MG Chew chew and swallow 1 tablet by mouth daily    atorvastatin (LIPITOR) 40 mg, Oral, Daily    cetirizine (ZYRTEC) 10 mg, Oral, Daily    diltiaZEM (CARDIZEM CD) 360 mg, Oral, Daily    losartan (COZAAR) 50 mg, Oral, Daily    metoprolol succinate (TOPROL-XL) 200 mg, Oral, 2 times daily    omeprazole (PRILOSEC) 20 mg, Oral, Daily, One tab by mouth daily    potassium chloride (KLOR-CON) 20 mEq Pack 20 mEq, Oral, Daily    spironolactone (ALDACTONE) 50 mg, Oral, Daily    torsemide (DEMADEX) 10 mg, Oral, Daily    vitamin D (VITAMIN D3) 1,000 Units, Oral, Daily    XARELTO 20 mg Tab TAKE 1 TABLET BY MOUTH DAILY with SUPPER       Current Inpatient Medications   aspirin  81 mg Per OG tube Daily    ceFEPime (MAXIPIME) IVPB  1 g Intravenous Q8H    diltiaZEM  240 mg Per OG tube Daily    enoxparin  1 mg/kg Subcutaneous Q12H (treatment, non-standard time)    famotidine (PF)  20 mg Intravenous Daily    levETIRAcetam (Keppra) IV (PEDS and ADULTS)  1,000 mg Intravenous Q12H    losartan  50 mg Per OG tube Daily    mannitol 20%  75 g Intravenous Once    metoprolol tartrate  100 mg Per OG tube BID    sennosides 8.8 mg/5 ml  5 mL Per OG tube QHS       Current Intravenous Infusions   sodium chloride 0.9% Stopped (12/29/23 0541)    dexmedeTOMIDine (Precedex) infusion (titrating) 0.2 mcg/kg/hr (12/29/23 0858)    fentanyl 150 mcg/hr (12/29/23 0543)    NORepinephrine bitartrate-D5W Stopped (12/20/23 1929)         Review of Systems   Unable to perform ROS: Critical illness          Objective:       Intake/Output Summary (Last 24 hours) at 12/29/2023 0958  Last data filed at 12/29/2023 0800  Gross per 24 hour   Intake 2611.59 ml   Output 835 ml   Net 1776.59 ml            Vital Signs (Most Recent):  Temp: 98.4 °F (36.9 °C) (12/29/23 0400)  Pulse: 60 (12/29/23 0915)  Resp: 20 (12/29/23 0915)  BP: 98/71 (12/29/23 0900)  SpO2: (!) 93 % (12/29/23 0915)  Body mass index is 36.88 kg/m².  Weight: 119.9 kg (264 lb 5.3 oz) Vital Signs (24h Range):  Temp:  [98.4 °F (36.9 °C)-99 °F (37.2 °C)] 98.4 °F (36.9 °C)  Pulse:  [57-91] 60  Resp:  [15-37] 20  SpO2:  [92 %-100 %] 93 %  BP: ()/() 98/71     Physical Exam  General: Intubated  HEENT: Surgical staples intact; pinpoint pupils, sluggish; nasal and oral mucosa moist and clear, ET tube in place  Pulm: CTA bilaterally, mechanically ventilated  CV: Irregular w/o murmurs or gallops; non-pitting edema in upper extremities, 1+ edema in BLE noted  GI: Bowel sound present in all quadrants, abdomen soft to palpation  MSK: No obvious deformities  Neuro: Moving right arm intermittently, minimally responsive    Lines/Drains/Airways       Drain  Duration                  NG/OG Tube 12/20/23 0930 16 Fr. Center mouth 9 days         Urethral Catheter 12/20/23 1007 8 days              Airway  Duration                  Airway - Non-Surgical 12/20/23 0930 Endotracheal Tube 9 days              Peripheral Intravenous Line  Duration                  Peripheral IV - Single Lumen 12/25/23 1705 20 G Anterior;Left;Proximal Forearm 3 days                    Significant Labs:    Lab Results   Component Value Date    WBC 11.29 12/29/2023    HGB 10.2 (L) 12/29/2023    HCT 31.7 (L) 12/29/2023    MCV 90.6 12/29/2023     12/29/2023           BMP  Lab Results   Component Value Date     (H) 12/29/2023    K 3.4 (L) 12/29/2023    CHLORIDE 114 (H) 12/29/2023    CO2 25 12/29/2023    BUN 17.2 12/29/2023    CREATININE 0.91 12/29/2023    CALCIUM 8.0 (L) 12/29/2023    AGAP 8.0 12/20/2023    EGFRNONAA 61 04/23/2022         ABG  Recent Labs   Lab 12/24/23  0500   PH 7.460*   PO2 101.0*   PCO2 39.0   HCO3 27.7*   POCBASEDEF 3.70*         Mechanical  Ventilation Support:  Vent Mode: A/C (12/29/23 0800)  Set Rate: 20 BPM (12/29/23 0800)  Vt Set: 475 mL (12/29/23 0800)  PEEP/CPAP: 5 cmH20 (12/29/23 0800)  Oxygen Concentration (%): 30 (12/29/23 0800)  Peak Airway Pressure: 26 cmH20 (12/29/23 0800)  Total Ve: 8.7 L/m (12/29/23 0800)  F/VT Ratio<105 (RSBI): (!) 46.19 (12/29/23 0800)      Significant Imaging:  I have reviewed the pertinent imaging within the past 24 hours.        Assessment/Plan:     Assessment  CVA s/p right ICA and MCA M3  branch thrombectomy s/p craniectomy with hemorrhagic conversion  Superficial thrombosis in left cephalic vein  Confirmed by DVT U/S on 12/26/2023  Acute hypoxic respiratory failure requiring intubation  Atrial fibrillation w/ RVR-rate now controlled.  Onychomycosis  HX of CAD, HTN, HLD, ADA      Plan  Patient going to the OR today for trach  Continue mechanical ventilation for now and wean vent settings as tolerated.    Patient remains sedated on fentanyl and Precedex, wean as tolerated  Appreciate the input of all consultants.  Remains on full-dose Lovenox for left cephalic vein thrombosis    DVT Prophylaxis:  Lovenox  GI Prophylaxis:  Famotidine     35 minutes of critical care was time spent personally by me on the following activities: development of treatment plan with patient or surrogate and bedside caregivers, discussions with consultants, evaluation of patient's response to treatment, examination of patient, ordering and performing treatments and interventions, ordering and review of laboratory studies, ordering and review of radiographic studies, pulse oximetry, re-evaluation of patient's condition.  This critical care time did not overlap with that of any other provider or involve time for any procedures.     Liz Segovia MD  Pulmonary Critical Care Medicine  [facility] - [facility]  DOS: 12/29/2023

## 2023-12-29 NOTE — PROGRESS NOTES
Inpatient Nutrition Assessment    Admit Date: 12/19/2023   Total duration of encounter: 10 days   Patient Age: 68 y.o.    Nutrition Recommendation/Prescription     Restart TF when appropriate.  Tube feeding recommendation:    Peptamen Intense VHP goal rate 75 ml/hr to provide  1500 kcal/d (97% est needs)  139 g protein/d (89% est needs)  1260 ml free water/d   (calculations based on estimated 20 hr/d run time)     Communication of Recommendations: reviewed with nurse    Nutrition Assessment     Malnutrition Assessment/Nutrition-Focused Physical Exam    Malnutrition Context: acute illness or injury (12/21/23 1406)  Malnutrition Level:  (does not meet criteria) (12/21/23 1406)  Energy Intake (Malnutrition):  (unable to eval) (12/21/23 1406)  Weight Loss (Malnutrition):  (unable to eval) (12/21/23 1406)  Subcutaneous Fat (Malnutrition):  (does not meet criteria) (12/21/23 1406)           Muscle Mass (Malnutrition):  (does not meet criteria) (12/21/23 1406)                          Fluid Accumulation (Malnutrition):  (does not meet criteria) (12/21/23 1406)        A minimum of two characteristics is recommended for diagnosis of either severe or non-severe malnutrition.    Chart Review    Reason Seen: continuous nutrition monitoring and follow-up    Malnutrition Screening Tool Results   Have you recently lost weight without trying?: No  Have you been eating poorly because of a decreased appetite?: No   MST Score: 0   Diagnosis:  CVA s/p right ICA and MCA M3  branch thrombectomy s/p craniectomy with hemorrhagic conversion  Acute hypoxic respiratory failure  Atrial fibrillation    Relevant Medical History: Afib, HTN, CAD, CAD (STEMI 2003), HFpEF     Scheduled Medications:  aspirin, 81 mg, Daily  ceFEPime (MAXIPIME) IVPB, 1 g, Q8H  diltiaZEM, 240 mg, Daily  enoxparin, 1 mg/kg, Q12H (treatment, non-standard time)  famotidine (PF), 20 mg, Daily  levETIRAcetam (Keppra) IV (PEDS and ADULTS), 1,000 mg, Q12H  losartan, 50 mg,  Daily  mannitol 20%, 75 g, Once  metoprolol tartrate, 100 mg, BID  sennosides 8.8 mg/5 ml, 5 mL, QHS    Continuous Infusions:  sodium chloride 0.9%, Last Rate: Stopped (12/29/23 0541)  dexmedeTOMIDine (Precedex) infusion (titrating), Last Rate: 0.2 mcg/kg/hr (12/29/23 1151)  fentanyl, Last Rate: 150 mcg/hr (12/29/23 1151)  NORepinephrine bitartrate-D5W, Last Rate: Stopped (12/20/23 1929)    PRN Medications: 0.9%  NaCl infusion (for blood administration), acetaminophen, dextrose 10%, dextrose 10%, fentaNYL, glucagon (human recombinant), hydrALAZINE, insulin aspart U-100, labetalol, metoprolol, ondansetron, polyethylene glycol, sodium chloride 0.9%    Calorie Containing IV Medications: no significant kcals from medications at this time    Recent Labs   Lab 12/26/23  0712 12/26/23 2007 12/27/23  0220 12/28/23  0152 12/29/23  0328   *  148* 148* 149* 148* 146*   K 3.7  --  3.3* 3.3* 3.4*   CALCIUM 8.0*  --  7.8* 8.0* 8.0*   PHOS 2.3  --  2.1* 2.8 2.7   MG 2.30  --  2.20 2.10 2.10   CHLORIDE 115*  --  116* 115* 114*   CO2 24  --  23 24 25   BUN 19.8  --  19.7 20.6 17.2   CREATININE 0.78  --  0.83 0.94 0.91   EGFRNORACEVR >60  --  >60 >60 >60   GLUCOSE 131*  --  111 111 107   BILITOT 1.3  --  1.1 0.9 0.8   ALKPHOS 55  --  55 54 52   ALT 55  --  66* 60* 57*   AST 57*  --  64* 52* 49*   ALBUMIN 2.4*  --  2.1* 1.9* 1.9*   WBC 12.95*  --  15.95* 15.04* 11.29   HGB 12.1*  --  11.8* 10.9* 10.2*   HCT 38.1*  --  37.1* 34.7* 31.7*        Nutrition Orders:  Diet NPO  Diet NPO      Appetite/Oral Intake: NPO/not applicable  Factors Affecting Nutritional Intake: on mechanical ventilation and tracheostomy  Food/Hindu/Cultural Preferences: unable to obtain  Food Allergies: none reported  Last Bowel Movement: 12/19/23  Wound(s):  incision noted    Comments    12/21/23: Discussed with RN. Will provide tube feeding recommendations for when appropriate to start tube feeding. Receiving kcal from meds.      12/22/23: Patient  "remains intubated, receiving kcal from meds. Cleviprex d/c'ed this morning, Diprivan continues. Patient currently with OG tube to LIS.     12/23/23: Pt with significant output via NG tube. Propofol infusion increased and Precedex started. Will leave TPN recommendations if Tube feeds are unable to be initiated by tomorrow.     12/26/23: TF continues, tolerated per RN. Noted no longer receiving kcal from meds. Will update goal rate to continue to meet est needs.     12/29/23: TF on hold for trach placement today. Pt recently returned, NG to be placed per RN. Once pt weaned off vent, will update TF and est needs.     Anthropometrics    Height: 5' 10.98" (180.3 cm),    Last Weight: 119.9 kg (264 lb 5.3 oz) (12/29/23 0541), Weight Method: Bed Scale  BMI (Calculated): 36.9  BMI Classification: obese grade I (BMI 30-34.9)        Ideal Body Weight (IBW), Male: 171.88 lb     % Ideal Body Weight, Male (lb): 140.99 %                          Usual Weight Provided By: unable to obtain usual weight    Wt Readings from Last 5 Encounters:   12/29/23 119.9 kg (264 lb 5.3 oz)   12/11/23 112.7 kg (248 lb 7.3 oz)   12/05/23 112.3 kg (247 lb 9.6 oz)   11/07/23 109.5 kg (241 lb 6.5 oz)   10/30/23 113.9 kg (251 lb 1.7 oz)     Weight Change(s) Since Admission:   Wt Readings from Last 1 Encounters:   12/29/23 0541 119.9 kg (264 lb 5.3 oz)   12/28/23 0600 116.9 kg (257 lb 11.5 oz)   12/27/23 0600 116.9 kg (257 lb 11.5 oz)   12/26/23 0600 116.1 kg (255 lb 15.3 oz)   12/24/23 0544 111.1 kg (244 lb 14.9 oz)   12/19/23 1645 110 kg (242 lb 8.1 oz)   12/19/23 1053 110 kg (242 lb 8.1 oz)   Admit Weight: 110 kg (242 lb 8.1 oz) (12/19/23 1053), Weight Method: Bed Scale    Estimated Needs    Weight Used For Calorie Calculations: 110 kg (242 lb 8.1 oz)  Energy Calorie Requirements (kcal): 1210-1540kcal (11-14kcal/kg)  Energy Need Method: Kcal/kg  Weight Used For Protein Calculations: 78.2 kg (172 lb 6.4 oz) (IBW)  Protein Requirements: 157gm (2g/kg " IBW)  Fluid Requirements (mL): 2750mL (25mL/kg CBW) or per MD    Enteral Nutrition     (On hold)  Formula: Peptamen Intense VHP  Rate/Volume: 75ml/hr  Water Flushes: 50ml q4hr  Additives/Modulars: none at this time  Route: nasogastric tube  Method: continuous  Total Nutrition Provided by Tube Feeding, Additives, and Flushes:  Calories Provided  1500 kcal/d, 97% needs   Protein Provided  139 g/d, 89% needs   Fluid Provided  1260 ml/d, N/A% needs   Continuous feeding calculations based on estimated 20 hr/d run time unless otherwise stated.    Parenteral Nutrition     Patient not receiving parenteral nutrition support at this time.    Evaluation of Received Nutrient Intake    Calories: not meeting estimated needs  Protein: not meeting estimated needs    Patient Education     Not applicable.    Nutrition Diagnosis     PES: Inadequate oral intake related to acute illness as evidenced by intubation/trach since 12/19/23. (active)     Nutrition Interventions     Intervention(s): collaboration with other providers    Goal: Meet greater than 80% of nutritional needs by follow-up. (goal progressing)  Goal: Tolerate enteral feeding at goal rate by follow-up. (goal progressing)    Nutrition Goals & Monitoring     Dietitian will monitor: energy intake    Nutrition Risk/Follow-Up: high (follow-up in 1-4 days)   Please consult if re-assessment needed sooner.

## 2023-12-29 NOTE — CONSULTS
Consult Note    Reason for Consult:      We were consulted by Dr. Drake to evaluate this patient for PEG.    HPI:     68-year-old AA male unknown to our group (primary GI is Dr. 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  Hospital course complicated by superficial thrombus and left cephalic vein, AFib RVR, acute hypoxemic respiratory failure requiring intubation.  Continues to have altered neurological status.  They are plans for tracheostomy.  GI consulted for PEG.    Previous records reviewed. Collateral information obtained from family member present at bedside.    PCP:  No, Primary Doctor    Review of patient's allergies indicates:  No Known Allergies     Current Facility-Administered Medications   Medication Dose Route Frequency Provider Last Rate Last Admin    0.9%  NaCl infusion (for blood administration)   Intravenous Q24H PRN Fidel Kraus, AGACNP-BC        0.9%  NaCl infusion   Intravenous Continuous Ijeoma Hernandez, NP   Stopped at 12/29/23 0541    acetaminophen oral solution 650 mg  650 mg Per OG tube Q4H PRN KODI Drake MD        aspirin chewable tablet 81 mg  81 mg Per OG tube Daily KODI Drake MD   81 mg at 12/29/23 0800    ceFEPIme (MAXIPIME) 1 g in dextrose 5 % in water (D5W) 100 mL IVPB (MB+)  1 g Intravenous Q8H Kenny Richter DO   Stopped at 12/29/23 0611    dexmedetomidine (PRECEDEX) 400mcg/100mL 0.9% NaCL infusion  0-1.4 mcg/kg/hr Intravenous Continuous Italo Orozco MD 5.5 mL/hr at 12/29/23 0858 0.2 mcg/kg/hr at 12/29/23 0858    dextrose 10% bolus 125 mL 125 mL  12.5 g Intravenous PRN Eleazar Westbrook MD        dextrose 10% bolus 250 mL 250 mL  25  g Intravenous PRN Eleazar Westbrook MD        diltiaZEM tablet 240 mg  240 mg Per OG tube Daily Kenny Richter DO   240 mg at 12/29/23 0803    enoxaparin injection 120 mg  1 mg/kg Subcutaneous Q12H (treatment, non-standard time) Gasper Barlow MD   120 mg at 12/27/23 2200    famotidine (PF) injection 20 mg  20 mg Intravenous Daily Kenny Richter DO   20 mg at 12/29/23 0800    fentaNYL 2500 mcg in 0.9% sodium chloride 250 mL infusion premix (titrating)  0-250 mcg/hr Intravenous Continuous Arun Norman DO 15 mL/hr at 12/29/23 0543 150 mcg/hr at 12/29/23 0543    fentaNYL injection 50 mcg  50 mcg Intravenous Q1H PRN Eleazar Westbrook MD   50 mcg at 12/24/23 1950    glucagon (human recombinant) injection 1 mg  1 mg Intramuscular PRN Eleazar Westbrook MD        hydrALAZINE injection 10 mg  10 mg Intravenous Q2H PRN Kenny Richter DO   10 mg at 12/28/23 0827    insulin aspart U-100 injection 0-5 Units  0-5 Units Subcutaneous Q6H PRN Eleazar Westbrook MD   2 Units at 12/20/23 0624    labetaloL injection 10 mg  10 mg Intravenous Q2H PRN Kenny Richter DO        levETIRAcetam in NaCl (iso-os) IVPB 1,000 mg  1,000 mg Intravenous Q12H Italo Orozco  mL/hr at 12/29/23 1019 1,000 mg at 12/29/23 1019    losartan tablet 50 mg  50 mg Per OG tube Daily Kenny Richter DO   50 mg at 12/29/23 0803    mannitol 20% infusion 75 g  75 g Intravenous Once Fidel Kraus AGACNP-BC        metoprolol injection 5 mg  5 mg Intravenous Q5 Min PRN Patrick Gipson DO   5 mg at 12/22/23 0904    metoprolol tartrate (LOPRESSOR) tablet 100 mg  100 mg Per OG tube BID Kenny Richter DO   100 mg at 12/29/23 0803    NORepinephrine 8 mg in dextrose 5% 250 mL infusion  0-3 mcg/kg/min Intravenous Continuous Italo Orozco MD   Stopped at 12/20/23 1929    ondansetron injection 8 mg  8 mg Intravenous Q6H PRN Patrick Gipson DO   8 mg at 12/20/23 0045    polyethylene glycol packet 17 g  17 g Oral Daily  PRN Gasper Barlow MD        sennosides 8.8 mg/5 ml syrup 5 mL  5 mL Per OG tube QHS KODI Drake MD   5 mL at 12/28/23 2019    sodium chloride 0.9% flush 10 mL  10 mL Intravenous PRN Ijeoma Hernandez, GIANNI         Medications Prior to Admission   Medication Sig Dispense Refill Last Dose    aspirin 81 MG Chew chew and swallow 1 tablet by mouth daily 90 tablet 3 12/18/2023    atorvastatin (LIPITOR) 40 MG tablet Take 1 tablet (40 mg total) by mouth once daily. 90 tablet 1 12/18/2023    diltiaZEM (CARDIZEM CD) 360 MG 24 hr capsule Take 1 capsule (360 mg total) by mouth once daily. 90 capsule 1 12/18/2023    losartan (COZAAR) 50 MG tablet Take 1 tablet (50 mg total) by mouth once daily. 90 tablet 1 12/18/2023    metoprolol succinate (TOPROL-XL) 200 MG 24 hr tablet Take 1 tablet (200 mg total) by mouth 2 (two) times daily. 180 tablet 1 12/18/2023    omeprazole (PRILOSEC) 20 MG capsule Take 1 capsule (20 mg total) by mouth once daily. One tab by mouth daily 30 capsule 2 12/18/2023    potassium chloride (KLOR-CON) 20 mEq Pack Take 20 mEq by mouth once daily. 30 packet 5 12/18/2023    spironolactone (ALDACTONE) 50 MG tablet Take 1 tablet (50 mg total) by mouth once daily. 90 tablet 1 12/18/2023    torsemide (DEMADEX) 10 MG Tab Take 1 tablet (10 mg total) by mouth once daily. 30 tablet 0 12/18/2023    vitamin D (VITAMIN D3) 1000 units Tab Take 1 tablet (1,000 Units total) by mouth once daily. 30 tablet 1 12/18/2023    XARELTO 20 mg Tab TAKE 1 TABLET BY MOUTH DAILY with SUPPER 90 tablet 3 12/18/2023    albuterol (PROVENTIL/VENTOLIN HFA) 90 mcg/actuation inhaler Inhale 2 puffs into the lungs every 6 (six) hours as needed for Wheezing. Rescue (Patient not taking: Reported on 8/22/2023) 8.5 g 0     cetirizine (ZYRTEC) 10 MG tablet Take 1 tablet (10 mg total) by mouth once daily. for 14 days 14 tablet 0        Past Medical History:  Past Medical History:   Diagnosis Date    Arthritis     Atrial fibrillation     BPH (benign  prostatic hyperplasia)     Cardiac arrest     Coronary artery disease     Cyst, kidney, acquired     Diverticulosis     Hyperlipidemia     Hypertension     MI (myocardial infarction)     Obesity     Steatosis of liver       Past Surgical History:  Past Surgical History:   Procedure Laterality Date    A-V CARDIAC PACEMAKER INSERTION Right     CARDIAC CATHETERIZATION      COLONOSCOPY W/ BIOPSIES      CRANIECTOMY Right 12/20/2023    Procedure: CRANIECTOMY;  Surgeon: Artem Can MD;  Location: [facility];  Service: Neurosurgery;  Laterality: Right;    excision of colon        Family History:  Family History   Problem Relation Age of Onset    Hypertension Mother     Hypertension Father     Hypertension Sister      Social History:  Social History     Tobacco Use    Smoking status: Former    Smokeless tobacco: Never   Substance Use Topics    Alcohol use: Not Currently       Review of Systems:     Review of Systems   Unable to perform ROS: Mental status change       Objective:     VITAL SIGNS: 24 HR MIN & MAX LAST    Temp  Min: 98.4 °F (36.9 °C)  Max: 99 °F (37.2 °C)  98.4 °F (36.9 °C)        BP  Min: 81/58  Max: 141/108  95/62     Pulse  Min: 60  Max: 91  60     Resp  Min: 15  Max: 37  18    SpO2  Min: 92 %  Max: 100 %  97 %        Intake/Output Summary (Last 24 hours) at 12/29/2023 1025  Last data filed at 12/29/2023 1000  Gross per 24 hour   Intake 2611.59 ml   Output 910 ml   Net 1701.59 ml       Physical Exam  Constitutional:       General: He is not in acute distress.     Appearance: He is not ill-appearing.   HENT:      Head:      Comments: Staples in place  Eyes:      General: No scleral icterus.  Cardiovascular:      Rate and Rhythm: Normal rate.   Pulmonary:      Effort: Pulmonary effort is normal. No respiratory distress.      Comments: Intubated on mechanical ventilation.   Abdominal:      General: Bowel sounds are normal. There is no distension.      Palpations: Abdomen is soft. There is no mass.       Tenderness: There is no abdominal tenderness. There is no guarding or rebound.      Comments: Midline abdominal surgical incision from prior SBO/SBR    Skin:     General: Skin is warm and dry.   Neurological:      Comments: Minimally responsive.           Recent Results (from the past 48 hour(s))   POCT glucose    Collection Time: 12/27/23 12:51 PM   Result Value Ref Range    POCT Glucose 149 (H) 70 - 110 mg/dL   POCT glucose    Collection Time: 12/27/23  5:54 PM   Result Value Ref Range    POCT Glucose 122 (H) 70 - 110 mg/dL   POCT glucose    Collection Time: 12/28/23 12:04 AM   Result Value Ref Range    POCT Glucose 112 (H) 70 - 110 mg/dL   Comprehensive Metabolic Panel    Collection Time: 12/28/23  1:52 AM   Result Value Ref Range    Sodium Level 148 (H) 136 - 145 mmol/L    Potassium Level 3.3 (L) 3.5 - 5.1 mmol/L    Chloride 115 (H) 98 - 107 mmol/L    Carbon Dioxide 24 23 - 31 mmol/L    Glucose Level 111 82 - 115 mg/dL    Blood Urea Nitrogen 20.6 8.4 - 25.7 mg/dL    Creatinine 0.94 0.73 - 1.18 mg/dL    Calcium Level Total 8.0 (L) 8.8 - 10.0 mg/dL    Protein Total 5.4 (L) 5.8 - 7.6 gm/dL    Albumin Level 1.9 (L) 3.4 - 4.8 g/dL    Globulin 3.5 2.4 - 3.5 gm/dL    Albumin/Globulin Ratio 0.5 (L) 1.1 - 2.0 ratio    Bilirubin Total 0.9 <=1.5 mg/dL    Alkaline Phosphatase 54 40 - 150 unit/L    Alanine Aminotransferase 60 (H) 0 - 55 unit/L    Aspartate Aminotransferase 52 (H) 5 - 34 unit/L    eGFR >60 mls/min/1.73/m2   Magnesium    Collection Time: 12/28/23  1:52 AM   Result Value Ref Range    Magnesium Level 2.10 1.60 - 2.60 mg/dL   Phosphorus    Collection Time: 12/28/23  1:52 AM   Result Value Ref Range    Phosphorus Level 2.8 2.3 - 4.7 mg/dL   CBC with Differential    Collection Time: 12/28/23  1:52 AM   Result Value Ref Range    WBC 15.04 (H) 4.50 - 11.50 x10(3)/mcL    RBC 3.72 (L) 4.70 - 6.10 x10(6)/mcL    Hgb 10.9 (L) 14.0 - 18.0 g/dL    Hct 34.7 (L) 42.0 - 52.0 %    MCV 93.3 80.0 - 94.0 fL    MCH 29.3 27.0 - 31.0  pg    MCHC 31.4 (L) 33.0 - 36.0 g/dL    RDW 15.1 11.5 - 17.0 %    Platelet 179 130 - 400 x10(3)/mcL    MPV 10.8 (H) 7.4 - 10.4 fL    Neut % 74.9 %    Lymph % 10.5 %    Mono % 6.7 %    Eos % 4.6 %    Basophil % 0.5 %    Lymph # 1.58 0.6 - 4.6 x10(3)/mcL    Neut # 11.27 (H) 2.1 - 9.2 x10(3)/mcL    Mono # 1.01 0.1 - 1.3 x10(3)/mcL    Eos # 0.69 0 - 0.9 x10(3)/mcL    Baso # 0.07 <=0.2 x10(3)/mcL    IG# 0.42 (H) 0 - 0.04 x10(3)/mcL    IG% 2.8 %    NRBC% 0.5 %   POCT glucose    Collection Time: 12/28/23  5:54 AM   Result Value Ref Range    POCT Glucose 81 70 - 110 mg/dL   POCT glucose    Collection Time: 12/28/23 11:40 AM   Result Value Ref Range    POCT Glucose 106 70 - 110 mg/dL   POCT glucose    Collection Time: 12/28/23  4:59 PM   Result Value Ref Range    POCT Glucose 106 70 - 110 mg/dL   Comprehensive Metabolic Panel    Collection Time: 12/29/23  3:28 AM   Result Value Ref Range    Sodium Level 146 (H) 136 - 145 mmol/L    Potassium Level 3.4 (L) 3.5 - 5.1 mmol/L    Chloride 114 (H) 98 - 107 mmol/L    Carbon Dioxide 25 23 - 31 mmol/L    Glucose Level 107 82 - 115 mg/dL    Blood Urea Nitrogen 17.2 8.4 - 25.7 mg/dL    Creatinine 0.91 0.73 - 1.18 mg/dL    Calcium Level Total 8.0 (L) 8.8 - 10.0 mg/dL    Protein Total 5.5 (L) 5.8 - 7.6 gm/dL    Albumin Level 1.9 (L) 3.4 - 4.8 g/dL    Globulin 3.6 (H) 2.4 - 3.5 gm/dL    Albumin/Globulin Ratio 0.5 (L) 1.1 - 2.0 ratio    Bilirubin Total 0.8 <=1.5 mg/dL    Alkaline Phosphatase 52 40 - 150 unit/L    Alanine Aminotransferase 57 (H) 0 - 55 unit/L    Aspartate Aminotransferase 49 (H) 5 - 34 unit/L    eGFR >60 mls/min/1.73/m2   Magnesium    Collection Time: 12/29/23  3:28 AM   Result Value Ref Range    Magnesium Level 2.10 1.60 - 2.60 mg/dL   Phosphorus    Collection Time: 12/29/23  3:28 AM   Result Value Ref Range    Phosphorus Level 2.7 2.3 - 4.7 mg/dL   CBC with Differential    Collection Time: 12/29/23  3:28 AM   Result Value Ref Range    WBC 11.29 4.50 - 11.50 x10(3)/mcL     RBC 3.50 (L) 4.70 - 6.10 x10(6)/mcL    Hgb 10.2 (L) 14.0 - 18.0 g/dL    Hct 31.7 (L) 42.0 - 52.0 %    MCV 90.6 80.0 - 94.0 fL    MCH 29.1 27.0 - 31.0 pg    MCHC 32.2 (L) 33.0 - 36.0 g/dL    RDW 15.3 11.5 - 17.0 %    Platelet 204 130 - 400 x10(3)/mcL    MPV 11.1 (H) 7.4 - 10.4 fL    Neut % 78.9 %    Lymph % 8.0 %    Mono % 6.2 %    Eos % 4.3 %    Basophil % 0.4 %    Lymph # 0.90 0.6 - 4.6 x10(3)/mcL    Neut # 8.92 2.1 - 9.2 x10(3)/mcL    Mono # 0.70 0.1 - 1.3 x10(3)/mcL    Eos # 0.48 0 - 0.9 x10(3)/mcL    Baso # 0.04 <=0.2 x10(3)/mcL    IG# 0.25 (H) 0 - 0.04 x10(3)/mcL    IG% 2.2 %    NRBC% 0.4 %       X-Ray Chest 1 View    Result Date: 12/27/2023  EXAMINATION: XR CHEST 1 VIEW CPT 81172 CLINICAL HISTORY: tube replacement; COMPARISON: December 27, 2023 FINDINGS: Examination reveals cardiomediastinal silhouette and pleuroparenchymal changes to be essentially unchanged as compared with the previous exam. Endotracheal tube is seen with the tip at the level of the clavicular heads     No significant change Electronically signed by: Vik Keen Date:    12/27/2023 Time:    12:56    X-Ray Chest 1 View    Result Date: 12/27/2023  EXAMINATION: XR CHEST 1 VIEW CPT 28680 CLINICAL HISTORY: tube placement; COMPARISON: December 24th 2023 FINDINGS: Cardiomediastinal silhouette and pleuroparenchymal changes are essentially unchanged as compared with the previous exam. Endotracheal tube is seen with the tip above the jackie nasogastric tube is seen with the tip below the diaphragm     No significant change. Support catheters as above Electronically signed by: Vik Keen Date:    12/27/2023 Time:    12:53    CV Ultrasound doppler venous arm left    Result Date: 12/26/2023  There was no evidence of deep vein thrombosis in the left upper extremity. A superficial thrombosis was identified in the left cephalic vein.     CV Ultrasound doppler arterial arm left    Result Date: 12/26/2023  Patent left upper extremity arterial  system with no evidence of focal stenosis or flow reduction. Triphasic waveforms identified throughout.     X-Ray Chest 1 View    Result Date: 12/24/2023  EXAMINATION XR CHEST 1 VIEW CLINICAL HISTORY respiratory failure; TECHNIQUE A total of 2 frontal image(s) of the chest. COMPARISON 20 December 2023 FINDINGS Lines/tubes/devices: Grossly unchanged positioning when allowing for differences in technique and patient rotation. The cardiac silhouette and central vascular structures are unchanged.  The trachea is midline. No new or worsening consolidation is identified. There is no enlarging pleural effusion or convincing pneumothorax. Regional osseous structures and extrathoracic soft tissues are similar. IMPRESSION No significant interval change. Electronically signed by: Isaac Carcamo Date:    12/24/2023 Time:    13:09    CT Head Without Contrast    Result Date: 12/22/2023  EXAMINATION: CT HEAD WITHOUT CONTRAST CLINICAL HISTORY: Stroke, follow up; TECHNIQUE: Low dose axial CT images obtained throughout the head without intravenous contrast.  Axial, sagittal and coronal reconstructions were performed and interpreted. DLP: 1181 mGycm All CT scans at this location are performed using dose optimization techniques as appropriate to a performed exam including the following automated exposure control, adjustment of the mA and/or kV according to patient size and/or use of iterative reconstruction technique COMPARISON: CT head 12/21/2023 FINDINGS: BRAIN: Massive right cerebral hemisphere infarct is again seen in similar configuration to the previous exam with loss of gray-white differentiation.  There is cytotoxic edema and gyral swelling.  Areas of hemorrhagic conversion throughout the area of infarction are very similar to the prior exam.  No evidence of enlarging bleed.  There are postsurgical changes of decompressive craniectomy with expected, unchanged bulging of the cerebrum at the craniectomy defect.  There is  approximately 5 mm right to left midline shift at the level of the superior margin of the thalami, similar to previous.  The posterior fossa and midline structures are unremarkable. VENTRICLES: There is mass effect on the right lateral ventricle related to cerebral edema.  Unchanged appearance of the left lateral ventricle with mild stable dilatation. EXTRA-AXIAL: There is a right subdural drain in place. BONES: Postop right craniectomy. SINUSES AND MASTOIDS: Visualized paranasal sinuses and mastoid air cells are clear.     No significant interval change compared to previous exam.  Large right hemispheric infarct with hemorrhagic transformation similar to prior.  Postop decompressive right craniectomy. Electronically signed by: Laureen Christina Date:    12/22/2023 Time:    09:16    CT Head Without Contrast    Result Date: 12/21/2023  EXAMINATION: CT HEAD WITHOUT CONTRAST CLINICAL HISTORY: Postop; TECHNIQUE: Axial scans were obtained from skull base to the vertex. Coronal and sagittal reconstructions obtained from the axial data. Automatic exposure control was utilized to limit radiation dose. Contrast: None Radiation Dose: Total DLP: 1012 mGy*cm COMPARISON: CT head dated 12/20/2023 FINDINGS: There are postoperative changes following right-sided craniectomy with subdural drain in place.  There are evolving ischemic changes in the right cerebral hemisphere with interval development of numerous parenchymal hemorrhages. There is mass effect with sulcal effacement, partial effacement of the right lateral ventricle and 6 mm of leftward midline shift, overall significantly improved.  The basal cisterns are partially effaced.  There is mild dilatation of the left lateral ventricle.  Skull base is intact.  There is mild scattered paranasal sinus mucosal thickening.     1. Postoperative changes following right-sided craniectomy. 2. Evolving ischemic changes in the right cerebral hemisphere with interval development of  hemorrhagic transformation. 3. Interval improvement of mass effect. No significant change from the [facility] interpretation. Electronically signed by: Vonnie Haney Date:    12/21/2023 Time:    06:47    X-Ray Chest 1 View    Result Date: 12/20/2023  EXAMINATION: XR CHEST 1 VIEW CLINICAL HISTORY: other; TECHNIQUE: Single frontal view of the chest was performed. COMPARISON: 12/20/2023 FINDINGS: Endotracheal tube, enteric tube, right IJ central venous catheter in right chest wall AICD are unchanged position. The heart size enlarged the pulmonary vasculature is congested. Left retrocardiac opacity identified with hypoventilatory changes lungs bibasilar atelectatic changes.  No effusion on the right.     Lines and tubes as above without significant interval change.  Persistent interstitial edema with likely left-sided effusion. Electronically signed by: Rico Ornelas MD Date:    12/20/2023 Time:    21:24    X-Ray Chest 1 View for Line/Tube Placement    Result Date: 12/20/2023  EXAMINATION: XR CHEST 1 VIEW FOR LINE/TUBE PLACEMENT CLINICAL HISTORY: s/p intubation; TECHNIQUE: Single frontal portable view of the chest was performed. COMPARISON: None FINDINGS: Examination reveals mediastinal silhouette to be within normal limits cardiac silhouette is mildly enlarged some increase interstitial markings and slightly more confluent airspace opacities identified specially in the left lung these might be related to poor inspiratory effort, however, superimposed infiltrate can not be completely excluded. There is an endotracheal tube with tip above the jackie nasogastric tube is seen with the tip below the diaphragm     Poor inspiratory effort accentuates the pulmonary vascular markings. Mild cardiomegaly. Slightly more confluent opacities in the left perihilar region and left base although it might be related to the poor inspiratory effort infiltrate cannot be completely excluded. Interval insertion of endotracheal tube and  nasogastric tube Electronically signed by: Vik Keen Date:    12/20/2023 Time:    10:29    CT Head Without Contrast    Result Date: 12/20/2023  EXAMINATION: CT HEAD WITHOUT CONTRAST CLINICAL HISTORY: Neuro deficit, acute, stroke suspected;stroke alert;; TECHNIQUE: Low dose axial images were obtained through the head.  Coronal and sagittal reformations were also performed. Contrast was not administered. Automatic exposure control was utilized to reduce the patient's radiation dose. DLP= 965 COMPARISON: 12/09/2023 FINDINGS: Increasing hypodensity and edema throughout the distribution of the right MCA.  There is increasing mass effect with 1 point 6 cm of right to left midline shift.  There is complete compression of the right lateral ventricle.     Worsening exam with development of 1.6 cm of right to left midline shift. Findings reported to Dr. Pearce prior to interpretation. Electronically signed by: Jg Plunkett Date:    12/20/2023 Time:    09:11    CV Ultrasound Bilateral Doppler Carotid    Result Date: 12/19/2023  The right internal carotid artery demonstrated less than 50% stenosis. The left internal carotid artery demonstrated less than 50% stenosis. The bilateral vertebral arteries were patent with antegrade flow. Bilateral internal carotid arteries demonstrated decreased velocities starting from the common carotid arteries.     Echo Saline Bubble? Yes    Result Date: 12/19/2023    Left Ventricle: There is moderate concentric hypertrophy. Normal wall motion. There is low normal systolic function with a visually estimated ejection fraction of 50 - 55%. Unable to assess diastolic function due to atrial fibrillation. Elevated left ventricular filling pressure.   Right Ventricle: Normal right ventricular cavity size. Systolic function is mildly reduced.   Left Atrium: Left atrium is mildly dilated. Agitated saline study of the atrial septum is negative, suggesting absence of intracardiac shunt at the  atrial level.   Right Atrium: Right atrium is dilated.   Mitral Valve: There is mild to moderate regurgitation with an anteromedial eccentrically directed jet.   Negative bubble study     IR Thrombectomy Intracranial Inc all Imaging    Result Date: 12/19/2023  EXAMINATION: IR THROMBECTOMY INTRACRANIAL INC ALL IMAGING CLINICAL HISTORY: Cerebral infarction, unspecifiedstroke; FINDINGS: Fluoroscopic assistance provided during vascular procedure.  Images were independently interpreted by the attending physician performing the procedure. Fluoro time 9.7 minutes. Reference Air Kerma: 917 mGy.     Fluoroscopic assistance provided as above. Electronically signed by: Tani Calderon Date:    12/19/2023 Time:    15:11    CTA STROKE MULTI-PHASE    Result Date: 12/19/2023  EXAMINATION: CTA STROKE MULTI-PHASE CLINICAL HISTORY: Neuro deficit, acute, stroke suspected; TECHNIQUE: CT angiogram was performed from the level of the jackie to the vertex prior to and following the IV administration of intravenous contrast.  Axial, sagittal and coronal reconstructions and maximum intensity projection reconstructions were performed. Arterial stenosis percentages are based on NASCET measurement criteria. Automated tube current modulation, weight-based exposure dosing, and/or iterative reconstruction technique utilized to reach lowest reasonably achievable exposure rate. DLP: 2045 mGycm COMPARISON: CT head 12/19/2023 at 10:57 hours FINDINGS: CTA NECK: AORTIC ARCH AND GREAT VESSELS: 2 vessel left aortic arch. RIGHT ICA: There is atherosclerotic plaque at the carotid bulb and proximal internal carotid artery with less than 50% stenosis.  There is irregular contour and inhomogeneous enhancement of the cervical carotid artery at the skull base and at the petrous carotid artery (for example series 1, image 289). LEFT ICA: Mild atherosclerotic plaque at the carotid bulb without hemodynamically significant stenosis. VERTEBRAL ARTERIES: Diminutive  vertebral arteries without stenosis. CTA HEAD: ANTERIOR CIRCULATION: On the arterial phase imaging there is asymmetric hypo perfusion and irregular appearance of the cervical carotid artery on the right.  There is poor enhancement at the HÉCTOR and M1 segment.  Contrast fades distally towards the M2 segment.  There is gross asymmetric hypoenhancement of the vasculature at the sylvian fissure when comparing right to left on the arterial phase.  On a slightly delayed phase obtained approximately 30-40 seconds later there is enhancement at the right anterior circulation which is now more symmetric compared to the left suggesting potential delayed in flow phenomenon or perfusion via the [id number].  The left anterior circulation enhances normally without significant stenosis. POSTERIOR CIRCULATION: No hemodynamically significant stenosis, aneurysmal dilatation, or dissection. NON-VASCULAR STRUCTURES (LIMITED EVALUATION): There is very subtle loss of gray-white differentiation in the region of the insular cortex and right frontal lobe and slight blurring of delineation of the basal ganglia on the right.  No appreciable hemorrhage. Poor dentition.  Dental caries and periapical lucency.     Irregular appearance of the right internal carotid artery near the skull base and petrous portion.  This is of uncertain etiology.  This could be related to soft atheromatous plaque, ill-defined dissection flap or artifact. Poor enhancement of the right anterior circulation including the MCA and HÉCTOR on arterial phase.  There is delayed enhancement of the right HÉCTOR and MCA seen on delayed phase postcontrast imaging suggesting upstream/inflow abnormality. Very subtle asymmetric loss of gray-white differentiation in the right cerebral hemisphere most pronounced at the frontal lobe and basal ganglia.  No appreciable hemorrhage. Findings discussed with clinician caring for patient Dr. Randle 12/19/2023 12:39. Electronically signed  by: Laureen Christina Date:    12/19/2023 Time:    12:59    CT HEAD FOR STROKE    Result Date: 12/19/2023  EXAMINATION: CT HEAD FOR STROKE CLINICAL HISTORY: Neuro deficit, acute, stroke suspected; TECHNIQUE: CT imaging of the head performed from the skull base to the vertex without intravenous contrast.  mGycm. Automatic exposure control, adjustment of mA/kV or iterative reconstruction technique was used to reduce radiation. COMPARISON: 23 May 2023 FINDINGS: There is no acute cortical infarct, hemorrhage or mass lesion.  No new parenchymal attenuation abnormality.  Ventricular size is stable.  There are vascular calcifications. Visualized paranasal sinuses and mastoid air cells are clear.     No acute intracranial findings or significant interval change compared to May 2023. Electronically signed by: Tani Calderon Date:    12/19/2023 Time:    11:33      Assessment / Plan:     68-year-old AA male unknown to our group (primary GI is Dr. 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 Admitted with acute CVA s/p thrombectomy s/p craniectomy with hemorrhagic conversion.  Hospital course complicated by superficial thrombus and left cephalic vein, AFib RVR, acute hypoxemic respiratory failure requiring intubation.  Continues to have altered neurological status.  They are plans for tracheostomy.  GI consulted for PEG.    1. Need for alternative means of nutrition  - Discussed EGD/PEG with family in detail including R/B/A.  Wishes to proceed.    - EGD/PEG Tuesday, 1/2/24. NPO after [facility] and Hold TFs at [facility] prior. Hold AM lovenox. Orders placed.    Thank you for allowing us to participate in this patient's care.

## 2023-12-29 NOTE — NURSING
Nurses Note -- 4 Eyes      12/29/2023   3:18 PM      Skin assessed during: Daily Assessment      [] No Altered Skin Integrity Present    [x]Prevention Measures Documented      [x] Yes- Altered Skin Integrity Present or Discovered   [] LDA Added if Not in Epic (Describe Wound)   [] New Altered Skin Integrity was Present on Admit and Documented in LDA   [] Wound Image Taken    Wound Care Consulted? No    Attending Nurse:  Taylor Johnson RN/Staff Member:   MÓNICA Barclay           Detail Level: Detailed

## 2023-12-29 NOTE — ANESTHESIA POSTPROCEDURE EVALUATION
Anesthesia Post Evaluation    Patient: Delio Daniel Jr.    Procedure(s) Performed: Procedure(s) (LRB):  CREATION, TRACHEOSTOMY (N/A)    Final Anesthesia Type: general (/Regional//MAC)      Patient location during evaluation: PACU  Post-procedure mental status: @ basline.  Pain management: adequate    PONV status: See postop meds for drugs used to control n/v if any.  Anesthetic complications: no      Cardiovascular status: blood pressure returned to baseline  Respiratory status: @ baseline.  Hydration status: euvolemic                Vitals Value Taken Time   /93 12/29/23 1324   Temp 98 12/29/23 1358   Pulse 89 12/29/23 1358   Resp 15 12/29/23 1358   SpO2 94 % 12/29/23 1358   Vitals shown include unvalidated device data.      No case tracking events are documented in the log.      Pain/Deana Score: Pain Rating Prior to Med Admin: 5 (12/29/2023  3:56 AM)  Pain Rating Post Med Admin: 0 (12/29/2023  4:27 AM)

## 2023-12-29 NOTE — PROGRESS NOTES
POD#9 right craniectomy for CVA due to thrombosis of right middle cerebral artery     Intubated mechanically ventilated   Precedex and fentanyl in place right now.  No major changes in neurological functioning.  To OR for trach today.    Patient still with singultus and violent coughing spells requiring additional sedation including both Precedex and fentanyl.    Current sodium 146.  Vital signs are stable.  No change neurologically.  Sluggishly reactive pupils. 2-1  Right upper extremity with spontaneous movement -reaching up towards tube this morning.  Occasional withdraw bilateral lower extremities.  +cough, corneal and gag    Incision open to air  Staples intact   Flap full but not tense.  Unchanged.        Plan:     Bone flap precautions  Helmet is at bedside    Continue hourly neurological exams  Continue BP parameters per neurology  Keppra BID, seizure precautions  SCDs for DVT prophylaxis, Lovenox.  Staple removal on 01/03/2023.  This was placed in the EMR for continuity of care.      To OR for trach today.      Ideally would like to give patient Ritalin amantadine to promote wakefulness but futile until off of IV sedation needed for respiratory issues.    Patient does have surveillance CT head scheduled for 01/02/2023.    Post-Op Info:  Procedure(s) (LRB):  CRANIECTOMY (Right)   9 Days Post-Op      Medications:  Continuous Infusions:   sodium chloride 0.9% Stopped (12/29/23 0541)    dexmedeTOMIDine (Precedex) infusion (titrating) 0.2 mcg/kg/hr (12/29/23 0858)    fentanyl 150 mcg/hr (12/29/23 0543)    NORepinephrine bitartrate-D5W Stopped (12/20/23 1929)     Scheduled Meds:   aspirin  81 mg Per OG tube Daily    ceFEPime (MAXIPIME) IVPB  1 g Intravenous Q8H    diltiaZEM  240 mg Per OG tube Daily    enoxparin  1 mg/kg Subcutaneous Q12H (treatment, non-standard time)    famotidine (PF)  20 mg Intravenous Daily    levETIRAcetam (Keppra) IV (PEDS and ADULTS)  1,000 mg Intravenous Q12H    losartan  50 mg Per OG  tube Daily    mannitol 20%  75 g Intravenous Once    metoprolol tartrate  100 mg Per OG tube BID    sennosides 8.8 mg/5 ml  5 mL Per OG tube QHS     PRN Meds:0.9%  NaCl infusion (for blood administration), acetaminophen, dextrose 10%, dextrose 10%, fentaNYL, glucagon (human recombinant), hydrALAZINE, insulin aspart U-100, labetalol, metoprolol, ondansetron, polyethylene glycol, sodium chloride 0.9%     Review of Systems  Objective:     Weight: 119.9 kg (264 lb 5.3 oz)  Body mass index is 36.88 kg/m².  Vital Signs (Most Recent):  Temp: 98.4 °F (36.9 °C) (12/29/23 0400)  Pulse: 60 (12/29/23 1045)  Resp: 20 (12/29/23 1045)  BP: 95/62 (12/29/23 1015)  SpO2: 100 % (12/29/23 1045) Vital Signs (24h Range):  Temp:  [98.4 °F (36.9 °C)-99 °F (37.2 °C)] 98.4 °F (36.9 °C)  Pulse:  [60-91] 60  Resp:  [15-37] 20  SpO2:  [92 %-100 %] 100 %  BP: ()/() 95/62     Date 12/29/23 0700 - 12/30/23 0659   Shift 0895-0239 3245-4729 9420-8694 24 Hour Total   INTAKE   Shift Total(mL/kg)       OUTPUT   Urine(mL/kg/hr) 150   150   Shift Total(mL/kg) 150(1.3)   150(1.3)   Weight (kg) 119.9 119.9 119.9 119.9                Vent Mode: A/C  Oxygen Concentration (%):  [30-40] 30  Resp Rate Total:  [20 br/min] 20 br/min  Vt Set:  [475 mL] 475 mL  PEEP/CPAP:  [5 cmH20] 5 cmH20  Mean Airway Pressure:  [9 aqN36-06 cmH20] 11 cmH20             Closed/Suction Drain Right Scalp (Active)   Site Description Unable to view 12/23/23 0800   Dressing Type Gauze 12/23/23 0800   Dressing Status Clean;Dry;Intact 12/23/23 0800   Dressing Intervention Integrity maintained 12/23/23 0800   Drainage Serosanguineous 12/23/23 0800   Status To bulb suction 12/23/23 0800   Output (mL) 15 mL 12/22/23 1841            NG/OG Tube 12/20/23 0930 16 Fr. Center mouth (Active)   Placement Check placement verified by aspirate characteristics 12/23/23 0800   Distal Tube Length (cm) 75 12/23/23 0800   Tolerance no signs/symptoms of discomfort 12/23/23 0800   Securement  "secured to commercial device 12/23/23 0800   Clamp Status/Tolerance unclamped 12/23/23 0800   Suction Setting/Drainage Method suction at;low;intermittent setting 12/23/23 0800   Insertion Site Appearance no redness, warmth, tenderness, skin breakdown, drainage 12/23/23 0800   Drainage Green;Bile;Brown 12/23/23 0800   Flush/Irrigation flushed w/;water;no resistance met 12/23/23 0800   Tube Output(mL)(Include Discarded Residual) 300 mL 12/22/23 0653            Urethral Catheter 12/20/23 1007 (Active)   $ Fernandez Insertion Bedside Insertion Performed 12/20/23 0930   Site Assessment Clean;Intact;Dry 12/23/23 0800   Collection Container Standard drainage bag 12/23/23 0800   Securement Method secured to top of thigh w/ adhesive device 12/23/23 0800   Catheter Care Performed yes 12/23/23 0800   Reason for Continuing Urinary Catheterization Critically ill in ICU and requiring hourly monitoring of intake/output 12/23/23 0800   CAUTI Prevention Bundle Securement Device in place with 1" slack;Intact seal between catheter & drainage tubing;Drainage bag/urimeter off the floor;No dependent loops or kinks;Sheeting clip in use;Drainage bag/urimeter below bladder;Drainage bag/urimeter not overfilled (<2/3 full) 12/23/23 0800   Output (mL) 125 mL 12/23/23 0800       Neurosurgery Physical Exam    Sedation effect at this time.  When off sedation-  PERRLA sluggish bilaterally   Right arm spontaneously reaches up towards tube during suctioning.      Minimal withdrawal to bilateral lower extremities to pain   Intact cough corneal gag    Significant Labs:  Recent Labs   Lab 12/28/23 0152 12/29/23 0328   * 146*   K 3.3* 3.4*   CO2 24 25   BUN 20.6 17.2   CREATININE 0.94 0.91   CALCIUM 8.0* 8.0*   MG 2.10 2.10       Recent Labs   Lab 12/28/23 0152 12/29/23 0328   WBC 15.04* 11.29   HGB 10.9* 10.2*   HCT 34.7* 31.7*    204       No results for input(s): "LABPT", "INR", "APTT" in the last 48 hours.  Microbiology Results (last 7 " days)       Procedure Component Value Units Date/Time    Blood Culture [[id number]]  (Normal) Collected: 12/23/23 1831    Order Status: Completed Specimen: Blood from Central Line Updated: 12/28/23 2000     CULTURE, BLOOD ([facility]) No Growth at 5 days    Blood Culture [[id number]]  (Normal) Collected: 12/23/23 1831    Order Status: Completed Specimen: Blood from IV Site Updated: 12/28/23 2000     CULTURE, BLOOD ([facility]) No Growth at 5 days    Urine culture [[id number]] Collected: 12/23/23 0523    Order Status: Completed Specimen: Urine Updated: 12/25/23 0936     Urine Culture No Significant Growth    Tissue Culture - Aerobic [[id number]] Collected: 12/20/23 1752    Order Status: Completed Specimen: Bone from Skull Updated: 12/25/23 0702     CULTURE, TISSUE- AEROBIC ([facility]) No Growth at 5 days    Anaerobic Culture [[id number]] Collected: 12/20/23 1752    Order Status: Completed Specimen: Bone from Skull Updated: 12/23/23 0754     Anaerobe Culture No Anaerobes Isolated            Active Diagnoses:    Diagnosis Date Noted POA    PRINCIPAL PROBLEM:  Stroke [I63.9] 12/19/2023 Yes      Problems Resolved During this Admission:       BLAIR Bey-BC  Neurosurgery  [facility] - [facility] ICU

## 2023-12-29 NOTE — ANESTHESIA PREPROCEDURE EVALUATION
12/29/2023  Delio Daniel Jr. is a 68 y.o., male with PMH of Afib (on Xarelto), HTN, CAD, CAD (STEMI 2003), HFpEF(55%), PM placement in 2017 for 2nd degree AVB replaced with dcICD 5/2018 for Vfib arrest in 3/2018 d/t prolonged QT and hypokalemia ; BPH, fatty liver, and neuroendocrine carcinoma of small bowel s/p resection 2018; presented to [facility] on 12/19 with complaints of L facial droop, slurred speech, L sided hemiparesis, and fixed R sided gaze. His last known normal was 9:15 per EMS. Stroke protocol in ED initiated. Unofficial CT head showed possible dense R MCA. Pt taken to cath lab for BRAD thrombectomy. Admitted to ICU for post-operative care and monitoring.    12/19/2023 - Admitted to ICU s/p right internal carotid artery thrombectomy 12/20/2023 - s/p craniectomy.  Patient remains intubated and sedated on precedex and fentanyl.  No significant change from neurologic standpoint.  Patient is planning to go to the OR today for trach.  Vent settings are at rate of 20, FiO2 of 30 and PEEP of 5.  Urine output has been 960 cc over the last 24 hours.  Hemodynamically stable.       Pre-op Assessment    I have reviewed the Patient Summary Reports.     I have reviewed the Nursing Notes. I have reviewed the NPO Status.   I have reviewed the Medications.     Review of Systems      Physical Exam  General: Unconscious    Airway:  Pre-Existing Airway: Oral Endotracheal tube    Chest/Lungs:  Symmetrical chest rise      Anesthesia Plan  Type of Anesthesia, risks & benefits discussed:    Anesthesia Type: Gen ETT  Intra-op Monitoring Plan: Standard ASA Monitors  Post Op Pain Control Plan: multimodal analgesia  Induction:  IV  Informed Consent: Informed consent signed with the Patient representative and all parties understand the risks and agree with anesthesia plan.  All questions answered.   ASA Score: 4  Day of Surgery  "Review of History & Physical: H&P Update referred to the surgeon/provider.    Ready For Surgery From Anesthesia Perspective.     .  I explained anesthesia plan to patient/responsbile party if available.  Anesthesia consent done going over the material facts, risks, complications & alternatives, obtained which includes the possibility of altering the anesthesia plan.  I reviewed problem list, prior to admission medication list, appropriate labs, any workup, Xray, EKG etc noted below.  Patients condition is satisfactory to proceed with anesthesia plan unless otherwise noted (see anesthesia chart for details of the anesthesia plan carried out).      Pre-operative evaluation for Procedure(s) (LRB):  CREATION, TRACHEOSTOMY (N/A)    BP 95/62   Pulse 60   Temp 36.9 °C (98.4 °F) (Axillary)   Resp 20   Ht 5' 10.98" (1.803 m)   Wt 119.9 kg (264 lb 5.3 oz)   SpO2 100%   BMI 36.88 kg/m²     Patient Active Problem List   Diagnosis    Neuroendocrine carcinoma of small bowel    Nodule of left lung    Hypertension    Hyperlipidemia LDL goal <70    Hypokalemia    Coronary artery disease involving native heart without angina pectoris    Second degree AV block    Cardiac arrest with ventricular fibrillation    Cardiac pacemaker in situ    History of deep vein thrombosis (DVT) of lower extremity    Current use of long term anticoagulation    Bilateral lower extremity edema    Atrial fibrillation    Benign prostatic hyperplasia    Myocardial infarction    Stroke       Review of patient's allergies indicates:  No Known Allergies    Current Outpatient Medications   Medication Instructions    albuterol (PROVENTIL/VENTOLIN HFA) 90 mcg/actuation inhaler 2 puffs, Inhalation, Every 6 hours PRN, Rescue    aspirin 81 MG Chew chew and swallow 1 tablet by mouth daily    atorvastatin (LIPITOR) 40 mg, Oral, Daily    cetirizine (ZYRTEC) 10 mg, Oral, Daily    diltiaZEM (CARDIZEM CD) 360 mg, Oral, Daily    losartan (COZAAR) 50 mg, Oral, Daily    " metoprolol succinate (TOPROL-XL) 200 mg, Oral, 2 times daily    omeprazole (PRILOSEC) 20 mg, Oral, Daily, One tab by mouth daily    potassium chloride (KLOR-CON) 20 mEq Pack 20 mEq, Oral, Daily    spironolactone (ALDACTONE) 50 mg, Oral, Daily    torsemide (DEMADEX) 10 mg, Oral, Daily    vitamin D (VITAMIN D3) 1,000 Units, Oral, Daily    XARELTO 20 mg Tab TAKE 1 TABLET BY MOUTH DAILY with SUPPER       Past Surgical History:   Procedure Laterality Date    A-V CARDIAC PACEMAKER INSERTION Right     CARDIAC CATHETERIZATION      COLONOSCOPY W/ BIOPSIES      CRANIECTOMY Right 12/20/2023    Procedure: CRANIECTOMY;  Surgeon: Artem Can MD;  Location: [facility] OR;  Service: Neurosurgery;  Laterality: Right;    excision of colon         Social History     Socioeconomic History    Marital status:     Number of children: 9   Occupational History    Occupation: retired   Tobacco Use    Smoking status: Former    Smokeless tobacco: Never   Substance and Sexual Activity    Alcohol use: Not Currently    Drug use: Not Currently    Sexual activity: Not Currently     Partners: Female     Social Determinants of Health     Financial Resource Strain: Low Risk  (10/19/2022)    Overall Financial Resource Strain (CARDIA)     Difficulty of Paying Living Expenses: Not hard at all   Food Insecurity: No Food Insecurity (10/19/2022)    Hunger Vital Sign     Worried About Running Out of Food in the Last Year: Never true     Ran Out of Food in the Last Year: Never true   Transportation Needs: No Transportation Needs (10/19/2022)    PRAPARE - Transportation     Lack of Transportation (Medical): No     Lack of Transportation (Non-Medical): No   Physical Activity: Sufficiently Active (10/19/2022)    Exercise Vital Sign     Days of Exercise per Week: 6 days     Minutes of Exercise per Session: 60 min   Stress: No Stress Concern Present (10/19/2022)    [place] [employer] - Occupational Stress Questionnaire     Feeling of  "Stress : Not at all   Social Connections: Unknown (10/19/2022)    Social Connection and Isolation Panel [NHANES]     Frequency of Communication with Friends and Family: More than three times a week     Frequency of Social Gatherings with Friends and Family: More than three times a week     Attends Taoism Services: More than 4 times per year     Active Member of Clubs or Organizations: No     Attends Club or Organization Meetings: Never   Housing Stability: Low Risk  (10/19/2022)    Housing Stability Vital Sign     Unable to Pay for Housing in the Last Year: No     Number of Places Lived in the Last Year: 1     Unstable Housing in the Last Year: No       Lab Results   Component Value Date    WBC 11.29 12/29/2023    HGB 10.2 (L) 12/29/2023    HCT 31.7 (L) 12/29/2023    MCV 90.6 12/29/2023     12/29/2023          BMP  Lab Results   Component Value Date    HCT 31.7 (L) 12/29/2023     (H) 12/29/2023    K 3.4 (L) 12/29/2023    BUN 17.2 12/29/2023    CREATININE 0.91 12/29/2023    CALCIUM 8.0 (L) 12/29/2023        INR  No results for input(s): "PT", "INR", "PROTIME", "APTT" in the last 72 hours.        Diagnostic Studies:      EKG:  Results for orders placed or performed during the hospital encounter of 12/19/23   ECG 12 lead    Collection Time: 12/19/23 11:47 AM    Narrative    Test Reason : R29.818,    Vent. Rate : 073 BPM     Atrial Rate : 000 BPM     P-R Int : 000 ms          QRS Dur : 090 ms      QT Int : 458 ms       P-R-T Axes : 000 092 206 degrees     QTc Int : 504 ms    Atrial fibrillation with occasional ventricular-paced complexes  Rightward axis  Septal infarct ,age undetermined  ST and T wave abnormality, consider inferior ischemia  ST and T wave abnormality, consider anterolateral ischemia  Prolonged QT  Abnormal ECG  When compared with ECG of 14-OCT-2023 07:38,  Electronic ventricular pacemaker has replaced Atrial fibrillation  Confirmed by Jg Allen MD ([id number]) on 12/19/2023 5:24:41 " PM    Referred By: [email or web address]   SELF           Confirmed By:Jg Allen MD

## 2023-12-29 NOTE — TRANSFER OF CARE
"Anesthesia Transfer of Care Note    Patient: Delio Daniel Jr.    Procedure(s) Performed: Procedure(s) (LRB):  CREATION, TRACHEOSTOMY (N/A)    Patient location: ICU    Anesthesia Type: general    Transport from OR: Transported from OR intubated on 100% O2 by AMBU with adequate controlled ventilation. Continuous SpO2 monitoring in transport. Continuous ECG monitoring in transport    Post pain: adequate analgesia    Post assessment: no apparent anesthetic complications    Post vital signs: stable    Level of consciousness: sedated    Nausea/Vomiting: no nausea/vomiting    Complications: none    Transfer of care protocol was followed      Last vitals: Visit Vitals  BP (!) 127/93   Pulse 95   Temp 36.9 °C (98.5 °F) (Axillary)   Resp 20   Ht 5' 10.98" (1.803 m)   Wt 119.9 kg (264 lb 5.3 oz)   SpO2 95%   BMI 36.88 kg/m²     "

## 2023-12-30 LAB
ALBUMIN SERPL-MCNC: 2.1 G/DL (ref 3.4–4.8)
ALBUMIN/GLOB SERPL: 0.6 RATIO (ref 1.1–2)
ALP SERPL-CCNC: 56 UNIT/L (ref 40–150)
ALT SERPL-CCNC: 70 UNIT/L (ref 0–55)
AST SERPL-CCNC: 66 UNIT/L (ref 5–34)
BASOPHILS # BLD AUTO: 0.05 X10(3)/MCL
BASOPHILS NFR BLD AUTO: 0.4 %
BILIRUB SERPL-MCNC: 0.8 MG/DL
BUN SERPL-MCNC: 23.5 MG/DL (ref 8.4–25.7)
CALCIUM SERPL-MCNC: 7.8 MG/DL (ref 8.8–10)
CHLORIDE SERPL-SCNC: 114 MMOL/L (ref 98–107)
CO2 SERPL-SCNC: 23 MMOL/L (ref 23–31)
CREAT SERPL-MCNC: 0.84 MG/DL (ref 0.73–1.18)
EOSINOPHIL # BLD AUTO: 0.1 X10(3)/MCL (ref 0–0.9)
EOSINOPHIL NFR BLD AUTO: 0.8 %
ERYTHROCYTE [DISTWIDTH] IN BLOOD BY AUTOMATED COUNT: 14.8 % (ref 11.5–17)
GFR SERPLBLD CREATININE-BSD FMLA CKD-EPI: >60 MLS/MIN/1.73/M2
GLOBULIN SER-MCNC: 3.5 GM/DL (ref 2.4–3.5)
GLUCOSE SERPL-MCNC: 130 MG/DL (ref 82–115)
HCT VFR BLD AUTO: 33.7 % (ref 42–52)
HGB BLD-MCNC: 10.7 G/DL (ref 14–18)
IMM GRANULOCYTES # BLD AUTO: 0.27 X10(3)/MCL (ref 0–0.04)
IMM GRANULOCYTES NFR BLD AUTO: 2.2 %
LYMPHOCYTES # BLD AUTO: 0.98 X10(3)/MCL (ref 0.6–4.6)
LYMPHOCYTES NFR BLD AUTO: 7.9 %
MAGNESIUM SERPL-MCNC: 2.2 MG/DL (ref 1.6–2.6)
MCH RBC QN AUTO: 29.1 PG (ref 27–31)
MCHC RBC AUTO-ENTMCNC: 31.8 G/DL (ref 33–36)
MCV RBC AUTO: 91.6 FL (ref 80–94)
MONOCYTES # BLD AUTO: 0.69 X10(3)/MCL (ref 0.1–1.3)
MONOCYTES NFR BLD AUTO: 5.5 %
NEUTROPHILS # BLD AUTO: 10.35 X10(3)/MCL (ref 2.1–9.2)
NEUTROPHILS NFR BLD AUTO: 83.2 %
NRBC BLD AUTO-RTO: 0.2 %
PHOSPHATE SERPL-MCNC: 3 MG/DL (ref 2.3–4.7)
PLATELET # BLD AUTO: 251 X10(3)/MCL (ref 130–400)
PMV BLD AUTO: 10.9 FL (ref 7.4–10.4)
POCT GLUCOSE: 129 MG/DL (ref 70–110)
POCT GLUCOSE: 95 MG/DL (ref 70–110)
POTASSIUM SERPL-SCNC: 4.3 MMOL/L (ref 3.5–5.1)
PROT SERPL-MCNC: 5.6 GM/DL (ref 5.8–7.6)
RBC # BLD AUTO: 3.68 X10(6)/MCL (ref 4.7–6.1)
SODIUM SERPL-SCNC: 146 MMOL/L (ref 136–145)
WBC # SPEC AUTO: 12.44 X10(3)/MCL (ref 4.5–11.5)

## 2023-12-30 PROCEDURE — 27200966 HC CLOSED SUCTION SYSTEM

## 2023-12-30 PROCEDURE — 20000000 HC ICU ROOM

## 2023-12-30 PROCEDURE — 25000003 PHARM REV CODE 250

## 2023-12-30 PROCEDURE — 83735 ASSAY OF MAGNESIUM: CPT

## 2023-12-30 PROCEDURE — 25000003 PHARM REV CODE 250: Performed by: INTERNAL MEDICINE

## 2023-12-30 PROCEDURE — 94761 N-INVAS EAR/PLS OXIMETRY MLT: CPT

## 2023-12-30 PROCEDURE — 51702 INSERT TEMP BLADDER CATH: CPT

## 2023-12-30 PROCEDURE — 63600175 PHARM REV CODE 636 W HCPCS: Performed by: STUDENT IN AN ORGANIZED HEALTH CARE EDUCATION/TRAINING PROGRAM

## 2023-12-30 PROCEDURE — 63600175 PHARM REV CODE 636 W HCPCS

## 2023-12-30 PROCEDURE — 80053 COMPREHEN METABOLIC PANEL: CPT

## 2023-12-30 PROCEDURE — 63600175 PHARM REV CODE 636 W HCPCS: Performed by: INTERNAL MEDICINE

## 2023-12-30 PROCEDURE — 27100171 HC OXYGEN HIGH FLOW UP TO 24 HOURS

## 2023-12-30 PROCEDURE — 84100 ASSAY OF PHOSPHORUS: CPT

## 2023-12-30 PROCEDURE — 25000003 PHARM REV CODE 250: Performed by: STUDENT IN AN ORGANIZED HEALTH CARE EDUCATION/TRAINING PROGRAM

## 2023-12-30 PROCEDURE — 94003 VENT MGMT INPAT SUBQ DAY: CPT

## 2023-12-30 PROCEDURE — 99900035 HC TECH TIME PER 15 MIN (STAT)

## 2023-12-30 PROCEDURE — 99900031 HC PATIENT EDUCATION (STAT)

## 2023-12-30 PROCEDURE — 99900026 HC AIRWAY MAINTENANCE (STAT)

## 2023-12-30 PROCEDURE — 85025 COMPLETE CBC W/AUTO DIFF WBC: CPT

## 2023-12-30 RX ORDER — POLYETHYLENE GLYCOL 3350 17 G/17G
17 POWDER, FOR SOLUTION ORAL DAILY
Status: DISCONTINUED | OUTPATIENT
Start: 2023-12-30 | End: 2023-12-31

## 2023-12-30 RX ORDER — DOCUSATE SODIUM 50 MG/5ML
50 LIQUID ORAL DAILY
Status: DISCONTINUED | OUTPATIENT
Start: 2023-12-30 | End: 2023-12-31

## 2023-12-30 RX ORDER — AMOXICILLIN 250 MG
1 CAPSULE ORAL DAILY
Status: DISCONTINUED | OUTPATIENT
Start: 2023-12-30 | End: 2023-12-31

## 2023-12-30 RX ADMIN — LABETALOL HYDROCHLORIDE 10 MG: 5 INJECTION, SOLUTION INTRAVENOUS at 04:12

## 2023-12-30 RX ADMIN — FAMOTIDINE 20 MG: 10 INJECTION, SOLUTION INTRAVENOUS at 08:12

## 2023-12-30 RX ADMIN — SODIUM CHLORIDE: 9 INJECTION, SOLUTION INTRAVENOUS at 02:12

## 2023-12-30 RX ADMIN — METOPROLOL TARTRATE 100 MG: 50 TABLET, FILM COATED ORAL at 08:12

## 2023-12-30 RX ADMIN — ENOXAPARIN SODIUM 120 MG: 150 INJECTION SUBCUTANEOUS at 10:12

## 2023-12-30 RX ADMIN — DILTIAZEM HYDROCHLORIDE 240 MG: 60 TABLET, FILM COATED ORAL at 08:12

## 2023-12-30 RX ADMIN — FENTANYL CITRATE 50 MCG: 50 INJECTION, SOLUTION INTRAMUSCULAR; INTRAVENOUS at 05:12

## 2023-12-30 RX ADMIN — DEXMEDETOMIDINE HYDROCHLORIDE 0.5 MCG/KG/HR: 4 INJECTION INTRAVENOUS at 04:12

## 2023-12-30 RX ADMIN — DOCUSATE SODIUM LIQUID 50 MG: 100 LIQUID ORAL at 12:12

## 2023-12-30 RX ADMIN — SENNOSIDES 5 ML: 8.8 LIQUID ORAL at 09:12

## 2023-12-30 RX ADMIN — METOROPROLOL TARTRATE 5 MG: 5 INJECTION, SOLUTION INTRAVENOUS at 06:12

## 2023-12-30 RX ADMIN — DEXMEDETOMIDINE HYDROCHLORIDE 0.2 MCG/KG/HR: 4 INJECTION INTRAVENOUS at 10:12

## 2023-12-30 RX ADMIN — ASPIRIN 81 MG CHEWABLE TABLET 81 MG: 81 TABLET CHEWABLE at 08:12

## 2023-12-30 RX ADMIN — LEVETIRACETAM INJECTION 1000 MG: 10 INJECTION INTRAVENOUS at 08:12

## 2023-12-30 RX ADMIN — HYDRALAZINE HYDROCHLORIDE 10 MG: 20 INJECTION INTRAMUSCULAR; INTRAVENOUS at 05:12

## 2023-12-30 RX ADMIN — LOSARTAN POTASSIUM 50 MG: 50 TABLET, FILM COATED ORAL at 08:12

## 2023-12-30 RX ADMIN — POLYETHYLENE GLYCOL 3350 17 G: 17 POWDER, FOR SOLUTION ORAL at 12:12

## 2023-12-30 RX ADMIN — SENNOSIDES AND DOCUSATE SODIUM 1 TABLET: 50; 8.6 TABLET ORAL at 12:12

## 2023-12-30 RX ADMIN — DEXMEDETOMIDINE HYDROCHLORIDE 1.4 MCG/KG/HR: 4 INJECTION INTRAVENOUS at 08:12

## 2023-12-30 NOTE — PROGRESS NOTES
[facility]  Pulmonary Critical Care Note    Patient Name: Delio Daniel Jr.  MRN: [id number]  Admission Date: 12/19/2023  Hospital Length of Stay: 11 days  Code Status: Full Code  Attending Provider: KODI Drake MD  Primary Care Provider: Candy, Primary Doctor     Subjective:     HPI:   Delio Daniel Jr. is a 68 y.o. 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 His last known normal was 9:15 per EMS. Stroke protocol in ED initiated. Unofficial CT head showed possible dense R MCA. Pt taken to cath lab for BRAD thrombectomy. Admitted to ICU for post-operative care and monitoring.     Hospital Course/Significant events:  12/19/2023 - Admitted to ICU s/p right internal carotid artery thrombectomy  12/20/2023 - s/p craniectomy    24 Hour Interval History:  NAEON. POD 1 s/p tracheostomy placement. GI was consulted and plans for EGD and PEG placement on 1/2/24. Patient remains on vent with settings rate of 20, PEEP 8, and FiO2 30. Remains sedated on precedex and fentanyl. He is otherwise HS. UOP has been 1 L over 24 hrs. Mild bump in WBC to 12.4, but has remained afebrile over the last 24 hrs.     Past Medical History:   Diagnosis Date    Arthritis     Atrial fibrillation     BPH (benign prostatic hyperplasia)     Cardiac arrest     Coronary artery disease     Cyst, kidney, acquired     Diverticulosis     Hyperlipidemia     Hypertension     MI (myocardial infarction)     Obesity     Steatosis of liver        Past Surgical History:   Procedure Laterality Date    A-V CARDIAC PACEMAKER INSERTION Right     CARDIAC CATHETERIZATION       COLONOSCOPY W/ BIOPSIES      CRANIECTOMY Right 12/20/2023    Procedure: CRANIECTOMY;  Surgeon: Artem Can MD;  Location: [facility] OR;  Service: Neurosurgery;  Laterality: Right;    excision of colon         Social History     Socioeconomic History    Marital status:     Number of children: 9   Occupational History    Occupation: retired   Tobacco Use    Smoking status: Former    Smokeless tobacco: Never   Substance and Sexual Activity    Alcohol use: Not Currently    Drug use: Not Currently    Sexual activity: Not Currently     Partners: Female     Social Determinants of Health     Financial Resource Strain: Low Risk  (10/19/2022)    Overall Financial Resource Strain (CARDIA)     Difficulty of Paying Living Expenses: Not hard at all   Food Insecurity: No Food Insecurity (10/19/2022)    Hunger Vital Sign     Worried About Running Out of Food in the Last Year: Never true     Ran Out of Food in the Last Year: Never true   Transportation Needs: No Transportation Needs (10/19/2022)    PRAPARE - Transportation     Lack of Transportation (Medical): No     Lack of Transportation (Non-Medical): No   Physical Activity: Sufficiently Active (10/19/2022)    Exercise Vital Sign     Days of Exercise per Week: 6 days     Minutes of Exercise per Session: 60 min   Stress: No Stress Concern Present (10/19/2022)    [place] [place] [employer] - Occupational Stress Questionnaire     Feeling of Stress : Not at all   Social Connections: Unknown (10/19/2022)    Social Connection and Isolation Panel [NHANES]     Frequency of Communication with Friends and Family: More than three times a week     Frequency of Social Gatherings with Friends and Family: More than three times a week     Attends L[employer] Services: More than 4 times per year     Active Member of Clubs or Organizations: No     Attends Club or Organization Meetings: Never   Housing Stability: Low Risk  (10/19/2022)    Housing Stability Vital Sign     Unable  to Pay for Housing in the Last Year: No     Number of Places Lived in the Last Year: 1     Unstable Housing in the Last Year: No           Current Outpatient Medications   Medication Instructions    albuterol (PROVENTIL/VENTOLIN HFA) 90 mcg/actuation inhaler 2 puffs, Inhalation, Every 6 hours PRN, Rescue    aspirin 81 MG Chew chew and swallow 1 tablet by mouth daily    atorvastatin (LIPITOR) 40 mg, Oral, Daily    cetirizine (ZYRTEC) 10 mg, Oral, Daily    diltiaZEM (CARDIZEM CD) 360 mg, Oral, Daily    losartan (COZAAR) 50 mg, Oral, Daily    metoprolol succinate (TOPROL-XL) 200 mg, Oral, 2 times daily    omeprazole (PRILOSEC) 20 mg, Oral, Daily, One tab by mouth daily    potassium chloride (KLOR-CON) 20 mEq Pack 20 mEq, Oral, Daily    spironolactone (ALDACTONE) 50 mg, Oral, Daily    torsemide (DEMADEX) 10 mg, Oral, Daily    vitamin D (VITAMIN D3) 1,000 Units, Oral, Daily    XARELTO 20 mg Tab TAKE 1 TABLET BY MOUTH DAILY with SUPPER       Current Inpatient Medications   aspirin  81 mg Per OG tube Daily    diltiaZEM  240 mg Per OG tube Daily    enoxparin  1 mg/kg Subcutaneous Q12H (treatment, non-standard time)    famotidine (PF)  20 mg Intravenous Daily    levETIRAcetam (Keppra) IV (PEDS and ADULTS)  1,000 mg Intravenous Q12H    losartan  50 mg Per OG tube Daily    mannitol 20%  75 g Intravenous Once    metoprolol tartrate  100 mg Per OG tube BID    sennosides 8.8 mg/5 ml  5 mL Per OG tube QHS       Current Intravenous Infusions   sodium chloride 0.9% 75 mL/hr at 12/30/23 0612    dexmedeTOMIDine (Precedex) infusion (titrating) 1.4 mcg/kg/hr (12/30/23 0824)    fentanyl 75 mcg/hr (12/30/23 0612)    NORepinephrine bitartrate-D5W Stopped (12/20/23 1929)         Review of Systems   Unable to perform ROS: Critical illness   All other systems reviewed and are negative.         Objective:       Intake/Output Summary (Last 24 hours) at 12/30/2023 0924  Last data filed at 12/30/2023 0612  Gross per 24 hour   Intake 2927.96 ml    Output 925 ml   Net 2002.96 ml           Vital Signs (Most Recent):  Temp: 98.8 °F (37.1 °C) (12/30/23 0400)  Pulse: 71 (12/30/23 0921)  Resp: 20 (12/30/23 0921)  BP: 127/80 (12/30/23 0914)  SpO2: 98 % (12/30/23 0921)  Body mass index is 36.88 kg/m².  Weight: 119.9 kg (264 lb 5.3 oz) Vital Signs (24h Range):  Temp:  [98.2 °F (36.8 °C)-99 °F (37.2 °C)] 98.8 °F (37.1 °C)  Pulse:  [60-98] 71  Resp:  [7-28] 20  SpO2:  [89 %-100 %] 98 %  BP: ()/() 127/80     Physical Exam  General: Intubated  HEENT: Surgical staples intact; pinpoint pupils, sluggish; nasal and oral mucosa moist and clear, trach in place  Pulm: CTA bilaterally, mechanically ventilated  CV: Irregular w/o murmurs or gallops; non-pitting edema in upper extremities, 1+ edema in BLE noted  GI: Bowel sound present in all quadrants, abdomen soft to palpation  MSK: No obvious deformities  Neuro: Moving right arm intermittently, minimally responsive    Lines/Drains/Airways       Drain  Duration                  Urethral Catheter 12/20/23 1007 9 days         NG/OG Tube 12/29/23 1459 Left mouth <1 day              Airway  Duration             Adult Surgical Airway 12/29/23 1229 <1 day              Peripheral Intravenous Line  Duration                  Peripheral IV - Single Lumen 12/25/23 1705 20 G Anterior;Left;Proximal Forearm 4 days         Midline Catheter Insertion/Assessment  - Single Lumen 12/29/23 1400 Right cephalic vein (lateral side of arm) <1 day                    Significant Labs:    Lab Results   Component Value Date    WBC 12.44 (H) 12/30/2023    HGB 10.7 (L) 12/30/2023    HCT 33.7 (L) 12/30/2023    MCV 91.6 12/30/2023     12/30/2023           BMP  Lab Results   Component Value Date     (H) 12/30/2023    K 4.3 12/30/2023    CHLORIDE 114 (H) 12/30/2023    CO2 23 12/30/2023    BUN 23.5 12/30/2023    CREATININE 0.84 12/30/2023    CALCIUM 7.8 (L) 12/30/2023    AGAP 8.0 12/20/2023    EGFRNONAA 61 04/23/2022         ABG  Recent  Labs   Lab 12/24/23  0500   PH 7.460*   PO2 101.0*   PCO2 39.0   HCO3 27.7*   POCBASEDEF 3.70*         Mechanical Ventilation Support:  Vent Mode: A/C (12/30/23 0411)  Set Rate: 20 BPM (12/30/23 0411)  Vt Set: 500 mL (12/30/23 0411)  PEEP/CPAP: 8 cmH20 (12/30/23 0411)  Oxygen Concentration (%): 30 (12/30/23 0411)  Peak Airway Pressure: 30 cmH20 (12/30/23 0411)  Total Ve: 7.4 L/m (12/30/23 0411)  F/VT Ratio<105 (RSBI): (!) 47.96 (12/29/23 2338)      Significant Imaging:  I have reviewed the pertinent imaging within the past 24 hours.        Assessment/Plan:     Assessment  CVA s/p right ICA and MCA M3  branch thrombectomy s/p craniectomy with hemorrhagic conversion  Superficial thrombosis in left cephalic vein  Confirmed by DVT U/S on 12/26/2023  Acute hypoxic respiratory failure requiring intubation  Atrial fibrillation w/ RVR-rate now controlled.  Onychomycosis  HX of CAD, HTN, HLD, ADA      Plan  -Plan for EGD and PEG on 1/2/24 with GI  -Continue mechanical ventilation for now and wean vent settings as tolerated  -Patient remains sedated on fentanyl and Precedex, wean as tolerated  -Appreciate the input of all consultants.  -Remains on full-dose Lovenox for left cephalic vein thrombosis    DVT Prophylaxis:  Lovenox  GI Prophylaxis:  Famotidine     35 minutes of critical care was time spent personally by me on the following activities: development of treatment plan with patient or surrogate and bedside caregivers, discussions with consultants, evaluation of patient's response to treatment, examination of patient, ordering and performing treatments and interventions, ordering and review of laboratory studies, ordering and review of radiographic studies, pulse oximetry, re-evaluation of patient's condition.  This critical care time did not overlap with that of any other provider or involve time for any procedures.     Liz Segovia MD  Pulmonary Critical Care Medicine  [facility]  DOS:  12/30/2023

## 2023-12-31 LAB
ALBUMIN SERPL-MCNC: 2.2 G/DL (ref 3.4–4.8)
ALBUMIN/GLOB SERPL: 0.7 RATIO (ref 1.1–2)
ALLENS TEST BLOOD GAS (OHS): YES
ALP SERPL-CCNC: 55 UNIT/L (ref 40–150)
ALT SERPL-CCNC: 62 UNIT/L (ref 0–55)
APPEARANCE UR: CLEAR
AST SERPL-CCNC: 51 UNIT/L (ref 5–34)
BACTERIA #/AREA URNS AUTO: ABNORMAL /HPF
BASE EXCESS BLD CALC-SCNC: 3.5 MMOL/L (ref -2–2)
BASOPHILS # BLD AUTO: 0.08 X10(3)/MCL
BASOPHILS NFR BLD AUTO: 0.7 %
BILIRUB SERPL-MCNC: 0.8 MG/DL
BILIRUB UR QL STRIP.AUTO: NEGATIVE
BLOOD GAS SAMPLE TYPE (OHS): ABNORMAL
BNP BLD-MCNC: 757.7 PG/ML
BUN SERPL-MCNC: 22.1 MG/DL (ref 8.4–25.7)
CA-I BLD-SCNC: 1.08 MMOL/L (ref 1.12–1.23)
CALCIUM SERPL-MCNC: 7.9 MG/DL (ref 8.8–10)
CHLORIDE SERPL-SCNC: 114 MMOL/L (ref 98–107)
CO2 BLDA-SCNC: 29 MMOL/L
CO2 SERPL-SCNC: 23 MMOL/L (ref 23–31)
COHGB MFR BLDA: 1.8 % (ref 0.5–1.5)
COLOR UR AUTO: YELLOW
CREAT SERPL-MCNC: 0.87 MG/DL (ref 0.73–1.18)
CREAT UR-MCNC: 130.2 MG/DL (ref 63–166)
DRAWN BY BLOOD GAS (OHS): ABNORMAL
EOSINOPHIL # BLD AUTO: 0.41 X10(3)/MCL (ref 0–0.9)
EOSINOPHIL NFR BLD AUTO: 3.7 %
ERYTHROCYTE [DISTWIDTH] IN BLOOD BY AUTOMATED COUNT: 15.1 % (ref 11.5–17)
GFR SERPLBLD CREATININE-BSD FMLA CKD-EPI: >60 MLS/MIN/1.73/M2
GLOBULIN SER-MCNC: 3.3 GM/DL (ref 2.4–3.5)
GLUCOSE SERPL-MCNC: 121 MG/DL (ref 82–115)
GLUCOSE UR QL STRIP.AUTO: NORMAL
HCO3 BLDA-SCNC: 27.8 MMOL/L (ref 22–26)
HCT VFR BLD AUTO: 33.5 % (ref 42–52)
HGB BLD-MCNC: 10.8 G/DL (ref 14–18)
HYALINE CASTS #/AREA URNS LPF: ABNORMAL /LPF
IMM GRANULOCYTES # BLD AUTO: 0.3 X10(3)/MCL (ref 0–0.04)
IMM GRANULOCYTES NFR BLD AUTO: 2.7 %
INHALED O2 CONCENTRATION: 40 %
KETONES UR QL STRIP.AUTO: NEGATIVE
LEUKOCYTE ESTERASE UR QL STRIP.AUTO: NEGATIVE
LYMPHOCYTES # BLD AUTO: 1.24 X10(3)/MCL (ref 0.6–4.6)
LYMPHOCYTES NFR BLD AUTO: 11.1 %
MAGNESIUM SERPL-MCNC: 2.1 MG/DL (ref 1.6–2.6)
MCH RBC QN AUTO: 29.2 PG (ref 27–31)
MCHC RBC AUTO-ENTMCNC: 32.2 G/DL (ref 33–36)
MCV RBC AUTO: 90.5 FL (ref 80–94)
MECH RR (OHS): 20 B/MIN
METHGB MFR BLDA: 0 % (ref 0.4–1.5)
MODE (OHS): AC
MONOCYTES # BLD AUTO: 0.75 X10(3)/MCL (ref 0.1–1.3)
MONOCYTES NFR BLD AUTO: 6.7 %
MUCOUS THREADS URNS QL MICRO: ABNORMAL /LPF
NEUTROPHILS # BLD AUTO: 8.38 X10(3)/MCL (ref 2.1–9.2)
NEUTROPHILS NFR BLD AUTO: 75.1 %
NITRITE UR QL STRIP.AUTO: NEGATIVE
NRBC BLD AUTO-RTO: 0.3 %
O2 HB BLOOD GAS (OHS): 96.8 % (ref 94–97)
OSMOLALITY SERPL: 310 MOSM/KG (ref 280–300)
OSMOLALITY UR: 884 MOSM/KG (ref 300–1300)
OXYGEN DEVICE BLOOD GAS (OHS): ABNORMAL
OXYHGB MFR BLDA: 10.4 G/DL (ref 12–16)
PCO2 BLDA: 40 MMHG (ref 35–45)
PEEP RESPIRATORY: 5 CMH2O
PH BLDA: 7.45 [PH] (ref 7.35–7.45)
PH UR STRIP.AUTO: 5.5 [PH]
PHOSPHATE SERPL-MCNC: 2.9 MG/DL (ref 2.3–4.7)
PLATELET # BLD AUTO: 286 X10(3)/MCL (ref 130–400)
PMV BLD AUTO: 11.4 FL (ref 7.4–10.4)
PO2 BLDA: 89 MMHG (ref 80–100)
POCT GLUCOSE: 104 MG/DL (ref 70–110)
POTASSIUM BLOOD GAS (OHS): 3.6 MMOL/L (ref 3.5–5)
POTASSIUM SERPL-SCNC: 3.6 MMOL/L (ref 3.5–5.1)
PROT SERPL-MCNC: 5.5 GM/DL (ref 5.8–7.6)
PROT UR QL STRIP.AUTO: ABNORMAL
PROT UR STRIP-MCNC: 161 MG/DL
RBC # BLD AUTO: 3.7 X10(6)/MCL (ref 4.7–6.1)
RBC #/AREA URNS AUTO: ABNORMAL /HPF
RBC UR QL AUTO: ABNORMAL
SAMPLE SITE BLOOD GAS (OHS): ABNORMAL
SAO2 % BLDA: 97.2 %
SODIUM BLOOD GAS (OHS): 141 MMOL/L (ref 137–145)
SODIUM SERPL-SCNC: 147 MMOL/L (ref 136–145)
SODIUM UR-SCNC: 133 MMOL/L
SP GR UR STRIP.AUTO: 1.03 (ref 1–1.03)
SPONT+MECH VT ON VENT: 460 ML
SQUAMOUS #/AREA URNS LPF: ABNORMAL /HPF
URINE PROTEIN/CREATININE RATIO (OLG): 1.2
UROBILINOGEN UR STRIP-ACNC: NORMAL
WBC # SPEC AUTO: 11.16 X10(3)/MCL (ref 4.5–11.5)
WBC #/AREA URNS AUTO: ABNORMAL /HPF

## 2023-12-31 PROCEDURE — 27200966 HC CLOSED SUCTION SYSTEM

## 2023-12-31 PROCEDURE — 63600175 PHARM REV CODE 636 W HCPCS: Performed by: INTERNAL MEDICINE

## 2023-12-31 PROCEDURE — 83735 ASSAY OF MAGNESIUM: CPT

## 2023-12-31 PROCEDURE — 84300 ASSAY OF URINE SODIUM: CPT

## 2023-12-31 PROCEDURE — 83935 ASSAY OF URINE OSMOLALITY: CPT

## 2023-12-31 PROCEDURE — 85025 COMPLETE CBC W/AUTO DIFF WBC: CPT

## 2023-12-31 PROCEDURE — 94761 N-INVAS EAR/PLS OXIMETRY MLT: CPT | Mod: XB

## 2023-12-31 PROCEDURE — 99900026 HC AIRWAY MAINTENANCE (STAT)

## 2023-12-31 PROCEDURE — 82570 ASSAY OF URINE CREATININE: CPT

## 2023-12-31 PROCEDURE — 25000003 PHARM REV CODE 250: Performed by: INTERNAL MEDICINE

## 2023-12-31 PROCEDURE — 80053 COMPREHEN METABOLIC PANEL: CPT

## 2023-12-31 PROCEDURE — 25000003 PHARM REV CODE 250

## 2023-12-31 PROCEDURE — 83930 ASSAY OF BLOOD OSMOLALITY: CPT

## 2023-12-31 PROCEDURE — 81001 URINALYSIS AUTO W/SCOPE: CPT

## 2023-12-31 PROCEDURE — 20000000 HC ICU ROOM

## 2023-12-31 PROCEDURE — 36600 WITHDRAWAL OF ARTERIAL BLOOD: CPT

## 2023-12-31 PROCEDURE — 99024 POSTOP FOLLOW-UP VISIT: CPT | Mod: ,,, | Performed by: NEUROLOGICAL SURGERY

## 2023-12-31 PROCEDURE — 83880 ASSAY OF NATRIURETIC PEPTIDE: CPT

## 2023-12-31 PROCEDURE — 94003 VENT MGMT INPAT SUBQ DAY: CPT

## 2023-12-31 PROCEDURE — 82803 BLOOD GASES ANY COMBINATION: CPT

## 2023-12-31 PROCEDURE — 99900035 HC TECH TIME PER 15 MIN (STAT)

## 2023-12-31 PROCEDURE — 63600175 PHARM REV CODE 636 W HCPCS

## 2023-12-31 PROCEDURE — 25000003 PHARM REV CODE 250: Performed by: STUDENT IN AN ORGANIZED HEALTH CARE EDUCATION/TRAINING PROGRAM

## 2023-12-31 PROCEDURE — 27100171 HC OXYGEN HIGH FLOW UP TO 24 HOURS

## 2023-12-31 PROCEDURE — 84100 ASSAY OF PHOSPHORUS: CPT

## 2023-12-31 PROCEDURE — 63600175 PHARM REV CODE 636 W HCPCS: Performed by: STUDENT IN AN ORGANIZED HEALTH CARE EDUCATION/TRAINING PROGRAM

## 2023-12-31 RX ORDER — POLYETHYLENE GLYCOL 3350 17 G/17G
17 POWDER, FOR SOLUTION ORAL 3 TIMES DAILY
Status: DISCONTINUED | OUTPATIENT
Start: 2023-12-31 | End: 2024-01-05

## 2023-12-31 RX ORDER — BUMETANIDE 0.25 MG/ML
1 INJECTION INTRAMUSCULAR; INTRAVENOUS ONCE
Status: COMPLETED | OUTPATIENT
Start: 2023-12-31 | End: 2023-12-31

## 2023-12-31 RX ORDER — BISACODYL 10 MG/1
10 SUPPOSITORY RECTAL ONCE
Status: COMPLETED | OUTPATIENT
Start: 2023-12-31 | End: 2023-12-31

## 2023-12-31 RX ORDER — DOCUSATE SODIUM 50 MG/5ML
100 LIQUID ORAL 2 TIMES DAILY
Status: DISCONTINUED | OUTPATIENT
Start: 2023-12-31 | End: 2024-01-05

## 2023-12-31 RX ORDER — SENNOSIDES 8.8 MG/5ML
5 LIQUID ORAL 2 TIMES DAILY
Status: DISCONTINUED | OUTPATIENT
Start: 2023-12-31 | End: 2024-01-05

## 2023-12-31 RX ORDER — CARVEDILOL 3.12 MG/1
3.12 TABLET ORAL 2 TIMES DAILY
Status: DISCONTINUED | OUTPATIENT
Start: 2023-12-31 | End: 2024-01-05

## 2023-12-31 RX ORDER — LEVETIRACETAM 500 MG/5ML
1000 INJECTION, SOLUTION, CONCENTRATE INTRAVENOUS EVERY 12 HOURS
Status: DISCONTINUED | OUTPATIENT
Start: 2023-12-31 | End: 2023-12-31

## 2023-12-31 RX ADMIN — HYDRALAZINE HYDROCHLORIDE 10 MG: 20 INJECTION INTRAMUSCULAR; INTRAVENOUS at 06:12

## 2023-12-31 RX ADMIN — DEXMEDETOMIDINE HYDROCHLORIDE 1.4 MCG/KG/HR: 4 INJECTION INTRAVENOUS at 03:12

## 2023-12-31 RX ADMIN — CARVEDILOL 3.12 MG: 3.12 TABLET, FILM COATED ORAL at 08:12

## 2023-12-31 RX ADMIN — ASPIRIN 81 MG CHEWABLE TABLET 81 MG: 81 TABLET CHEWABLE at 08:12

## 2023-12-31 RX ADMIN — DEXMEDETOMIDINE HYDROCHLORIDE 1 MCG/KG/HR: 4 INJECTION INTRAVENOUS at 08:12

## 2023-12-31 RX ADMIN — SENNOSIDES 5 ML: 8.8 LIQUID ORAL at 08:12

## 2023-12-31 RX ADMIN — LOSARTAN POTASSIUM 50 MG: 50 TABLET, FILM COATED ORAL at 08:12

## 2023-12-31 RX ADMIN — METOROPROLOL TARTRATE 5 MG: 5 INJECTION, SOLUTION INTRAVENOUS at 03:12

## 2023-12-31 RX ADMIN — FAMOTIDINE 20 MG: 10 INJECTION, SOLUTION INTRAVENOUS at 08:12

## 2023-12-31 RX ADMIN — LEVETIRACETAM INJECTION 1000 MG: 10 INJECTION INTRAVENOUS at 08:12

## 2023-12-31 RX ADMIN — BISACODYL 10 MG: 10 SUPPOSITORY RECTAL at 08:12

## 2023-12-31 RX ADMIN — DEXMEDETOMIDINE HYDROCHLORIDE 1 MCG/KG/HR: 4 INJECTION INTRAVENOUS at 04:12

## 2023-12-31 RX ADMIN — SODIUM CHLORIDE, POTASSIUM CHLORIDE, SODIUM LACTATE AND CALCIUM CHLORIDE 1000 ML: 600; 310; 30; 20 INJECTION, SOLUTION INTRAVENOUS at 04:12

## 2023-12-31 RX ADMIN — DOCUSATE SODIUM 100 MG: 50 LIQUID ORAL at 09:12

## 2023-12-31 RX ADMIN — LABETALOL HYDROCHLORIDE 10 MG: 5 INJECTION, SOLUTION INTRAVENOUS at 12:12

## 2023-12-31 RX ADMIN — POLYETHYLENE GLYCOL 3350 17 G: 17 POWDER, FOR SOLUTION ORAL at 08:12

## 2023-12-31 RX ADMIN — DEXMEDETOMIDINE HYDROCHLORIDE 1 MCG/KG/HR: 4 INJECTION INTRAVENOUS at 11:12

## 2023-12-31 RX ADMIN — DILTIAZEM HYDROCHLORIDE 240 MG: 60 TABLET, FILM COATED ORAL at 08:12

## 2023-12-31 RX ADMIN — SENNOSIDES 5 ML: 8.8 LIQUID ORAL at 09:12

## 2023-12-31 RX ADMIN — ENOXAPARIN SODIUM 120 MG: 150 INJECTION SUBCUTANEOUS at 11:12

## 2023-12-31 RX ADMIN — DEXMEDETOMIDINE HYDROCHLORIDE 1 MCG/KG/HR: 4 INJECTION INTRAVENOUS at 06:12

## 2023-12-31 RX ADMIN — DEXMEDETOMIDINE HYDROCHLORIDE 1 MCG/KG/HR: 4 INJECTION INTRAVENOUS at 12:12

## 2023-12-31 RX ADMIN — DOCUSATE SODIUM 100 MG: 50 LIQUID ORAL at 08:12

## 2023-12-31 RX ADMIN — POLYETHYLENE GLYCOL 3350 17 G: 17 POWDER, FOR SOLUTION ORAL at 02:12

## 2023-12-31 RX ADMIN — BUMETANIDE 1 MG: 0.25 INJECTION INTRAMUSCULAR; INTRAVENOUS at 08:12

## 2023-12-31 NOTE — PROGRESS NOTES
[facility]  Pulmonary Critical Care Note    Patient Name: Delio Daniel Jr.  MRN: [id number]  Admission Date: 12/19/2023  Hospital Length of Stay: 12 days  Code Status: Full Code  Attending Provider: KODI Drake MD  Primary Care Provider: aCndy, Primary Doctor     Subjective:     HPI:   Delio Daniel Jr. is a 68 y.o. 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 His last known normal was 9:15 per EMS. Stroke protocol in ED initiated. Unofficial CT head showed possible dense R MCA. Pt taken to cath lab for BRAD thrombectomy. Admitted to ICU for post-operative care and monitoring.     Hospital Course/Significant events:  12/19/2023 - Admitted to ICU s/p right internal carotid artery thrombectomy  12/20/2023 - s/p craniectomy      24 Hour Interval History:  NAEON. POD 2 s/p tracheostomy placement. GI following with plans for EGD and PEG placement on 1/2/24.  Last bowel movement reported 12/19/2023.  Began having black-colored emesis yesterday, feeds stopped at that time.  Patient remains on vent with settings rate of 20, PEEP 8, and FiO2 30, the wean pending sedation meantime.  Urine output consistently 50 cc/hour.    Past Medical History:   Diagnosis Date    Arthritis     Atrial fibrillation     BPH (benign prostatic hyperplasia)     Cardiac arrest     Coronary artery disease     Cyst, kidney, acquired     Diverticulosis     Hyperlipidemia     Hypertension     MI (myocardial infarction)     Obesity     Steatosis of liver        Past Surgical History:   Procedure Laterality Date    A-V CARDIAC PACEMAKER INSERTION Right     CARDIAC  CATHETERIZATION      COLONOSCOPY W/ BIOPSIES      CRANIECTOMY Right 12/20/2023    Procedure: CRANIECTOMY;  Surgeon: Artem Can MD;  Location: [facility] OR;  Service: Neurosurgery;  Laterality: Right;    excision of colon         Social History     Socioeconomic History    Marital status:     Number of children: 9   Occupational History    Occupation: retired   Tobacco Use    Smoking status: Former    Smokeless tobacco: Never   Substance and Sexual Activity    Alcohol use: Not Currently    Drug use: Not Currently    Sexual activity: Not Currently     Partners: Female     Social Determinants of Health     Financial Resource Strain: Low Risk  (10/19/2022)    Overall Financial Resource Strain (CARDIA)     Difficulty of Paying Living Expenses: Not hard at all   Food Insecurity: No Food Insecurity (10/19/2022)    Hunger Vital Sign     Worried About Running Out of Food in the Last Year: Never true     Ran Out of Food in the Last Year: Never true   Transportation Needs: No Transportation Needs (10/19/2022)    PRAPARE - Transportation     Lack of Transportation (Medical): No     Lack of Transportation (Non-Medical): No   Physical Activity: Sufficiently Active (10/19/2022)    Exercise Vital Sign     Days of Exercise per Week: 6 days     Minutes of Exercise per Session: 60 min   Stress: No Stress Concern Present (10/19/2022)    [employer] [place] [employer] - Occupational Stress Questionnaire     Feeling of Stress : Not at all   Social Connections: Unknown (10/19/2022)    Social Connection and Isolation Panel [NHANES]     Frequency of Communication with Friends and Family: More than three times a week     Frequency of Social Gatherings with Friends and Family: More than three times a week     Attends [employer]ond[employer] Services: More than 4 times per year     Active Member of Clubs or Organizations: No     Attends Club or Organization Meetings: Never   Housing Stability: Low Risk  (10/19/2022)    Housing Stability  Vital Sign     Unable to Pay for Housing in the Last Year: No     Number of Places Lived in the Last Year: 1     Unstable Housing in the Last Year: No           Current Outpatient Medications   Medication Instructions    albuterol (PROVENTIL/VENTOLIN HFA) 90 mcg/actuation inhaler 2 puffs, Inhalation, Every 6 hours PRN, Rescue    aspirin 81 MG Chew chew and swallow 1 tablet by mouth daily    atorvastatin (LIPITOR) 40 mg, Oral, Daily    cetirizine (ZYRTEC) 10 mg, Oral, Daily    diltiaZEM (CARDIZEM CD) 360 mg, Oral, Daily    losartan (COZAAR) 50 mg, Oral, Daily    metoprolol succinate (TOPROL-XL) 200 mg, Oral, 2 times daily    omeprazole (PRILOSEC) 20 mg, Oral, Daily, One tab by mouth daily    potassium chloride (KLOR-CON) 20 mEq Pack 20 mEq, Oral, Daily    spironolactone (ALDACTONE) 50 mg, Oral, Daily    torsemide (DEMADEX) 10 mg, Oral, Daily    vitamin D (VITAMIN D3) 1,000 Units, Oral, Daily    XARELTO 20 mg Tab TAKE 1 TABLET BY MOUTH DAILY with SUPPER       Current Inpatient Medications   aspirin  81 mg Per OG tube Daily    diltiaZEM  240 mg Per OG tube Daily    docusate  50 mg Per NG tube Daily    enoxparin  1 mg/kg Subcutaneous Q12H (treatment, non-standard time)    famotidine (PF)  20 mg Intravenous Daily    levETIRAcetam (Keppra) IV (PEDS and ADULTS)  1,000 mg Intravenous Q12H    losartan  50 mg Per OG tube Daily    mannitol 20%  75 g Intravenous Once    metoprolol tartrate  100 mg Per OG tube BID    polyethylene glycol  17 g Per NG tube Daily    senna-docusate 8.6-50 mg  1 tablet Per OG tube Daily    sennosides 8.8 mg/5 ml  5 mL Per OG tube QHS       Current Intravenous Infusions   sodium chloride 0.9% 75 mL/hr at 12/31/23 0518    dexmedeTOMIDine (Precedex) infusion (titrating) 1 mcg/kg/hr (12/31/23 0518)    fentanyl 100 mcg/hr (12/31/23 0518)    NORepinephrine bitartrate-D5W Stopped (12/20/23 1929)         Review of Systems   Unable to perform ROS: Critical illness   All other systems reviewed and are  negative.         Objective:       Intake/Output Summary (Last 24 hours) at 12/31/2023 0617  Last data filed at 12/31/2023 0518  Gross per 24 hour   Intake 3345.55 ml   Output 1455 ml   Net 1890.55 ml           Vital Signs (Most Recent):  Temp: 98.8 °F (37.1 °C) (12/31/23 0000)  Pulse: 88 (12/31/23 0500)  Resp: (!) 0 (12/31/23 0500)  BP: (!) 150/103 (12/31/23 0500)  SpO2: 97 % (12/31/23 0500)  Body mass index is 36.88 kg/m².  Weight: 119.9 kg (264 lb 5.3 oz) Vital Signs (24h Range):  Temp:  [97.3 °F (36.3 °C)-98.8 °F (37.1 °C)] 98.8 °F (37.1 °C)  Pulse:  [] 88  Resp:  [0-23] 0  SpO2:  [93 %-100 %] 97 %  BP: ()/() 150/103     Physical Exam  General: Intubated  HEENT: Surgical staples intact; pinpoint pupils, sluggish; nasal and oral mucosa moist and clear, trach in place  Pulm: CTA bilaterally, mechanically ventilated  CV: Irregular w/o murmurs or gallops; non-pitting edema in upper extremities, 1+ edema in BLE noted  GI:  Bowel sounds diminished, distended abdomen without rigidity/guarding (patient is sedated)  MSK: No obvious deformities; diffuse anasarca  Neuro: Moving right arm intermittently, minimally responsive    Lines/Drains/Airways       Drain  Duration                  Urethral Catheter 12/20/23 1007 10 days         NG/OG Tube 12/29/23 1459 Left mouth 1 day              Airway  Duration             Adult Surgical Airway 12/29/23 1229 1 day              Peripheral Intravenous Line  Duration                  Peripheral IV - Single Lumen 12/25/23 1705 20 G Anterior;Left;Proximal Forearm 5 days         Midline Catheter Insertion/Assessment  - Single Lumen 12/29/23 1400 Right cephalic vein (lateral side of arm) 1 day                    Significant Labs:    Lab Results   Component Value Date    WBC 11.16 12/31/2023    HGB 10.8 (L) 12/31/2023    HCT 33.5 (L) 12/31/2023    MCV 90.5 12/31/2023     12/31/2023           BMP  Lab Results   Component Value Date     (H) 12/31/2023    K 3.6  "12/31/2023    CHLORIDE 114 (H) 12/31/2023    CO2 23 12/31/2023    BUN 22.1 12/31/2023    CREATININE 0.87 12/31/2023    CALCIUM 7.9 (L) 12/31/2023    AGAP 8.0 12/20/2023    EGFRNONAA 61 04/23/2022         ABG  No results for input(s): "PH", "PO2", "PCO2", "HCO3", "POCBASEDEF" in the last 168 hours.      Mechanical Ventilation Support:  Vent Mode: A/C (12/31/23 0605)  Set Rate: 20 BPM (12/31/23 0605)  Vt Set: 450 mL (12/31/23 0605)  PEEP/CPAP: 5 cmH20 (12/31/23 0605)  Oxygen Concentration (%): 40 (12/31/23 0034)  Peak Airway Pressure: 26 cmH20 (12/31/23 0605)  Total Ve: 8.6 L/m (12/31/23 0605)  F/VT Ratio<105 (RSBI): (!) 48.46 (12/30/23 1658)      Significant Imaging:  I have reviewed the pertinent imaging within the past 24 hours.        Assessment/Plan:     Assessment  CVA s/p right ICA and MCA M3  branch thrombectomy s/p craniectomy with hemorrhagic conversion  Superficial thrombosis in left cephalic vein  Confirmed by DVT U/S on 12/26/2023  Acute hypoxic respiratory failure requiring intubation  Atrial fibrillation w/ RVR-rate now controlled.  Onychomycosis  HX of CAD, HTN, HLD, ADA      Plan  -Plan for EGD and PEG on 1/2/24 with GI  -Continue mechanical ventilation for now and wean vent settings as tolerated  -Patient remains sedated on Precedex, wean as tolerated; try to avoid further opioid use pending BM  -ordered XR abdomen flat/erect and CXR this AM  -Increased intensity of bowel regime --> scheduled Miralax TID and Senna BID until BM achievement; continue to monitor for peritoneal signs  -holding tube feedings at this time given black colored emesis  -Appreciate the input of all consultants  -Remains on full-dose Lovenox for left cephalic vein thrombosis  -urine studies, osmoles, etc. Ordered  -renal indices stable, 11+ L net fluid with poor urine output likely due to ineffective circulatory volume from 3rd spacing --> trial bumex 1 mg IV once this morning; stopped NS infusion 75 cc/hr    DVT Prophylaxis:  " Lovenox  GI Prophylaxis:  Famotidine     35 minutes of critical care was time spent personally by me on the following activities: development of treatment plan with patient or surrogate and bedside caregivers, discussions with consultants, evaluation of patient's response to treatment, examination of patient, ordering and performing treatments and interventions, ordering and review of laboratory studies, ordering and review of radiographic studies, pulse oximetry, re-evaluation of patient's condition.  This critical care time did not overlap with that of any other provider or involve time for any procedures.     Christiano Taylor MD  Internal Medicine - PGY-2    Pulmonary Critical Care Medicine  [facility]  DOS: 12/31/2023

## 2023-12-31 NOTE — PROGRESS NOTES
Hospital ICU Progress Note  [facility]      Admit Date: 12/19/2023  Post-operative Day: 2 Days Post-Op  Hospital Day: 13    I am cross covering this patient for neurosurgeon Dr. Can.    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  He has been on a Precedex gtt, which has been held for a few minutes prior to exam.  He is having coffee colored GI contents from his NG tube.  GI Medicine is following.       Scheduled Meds:   aspirin  81 mg Per OG tube Daily    carvediloL  3.125 mg Oral BID    diltiaZEM  240 mg Per OG tube Daily    docusate  100 mg Oral BID    enoxparin  1 mg/kg Subcutaneous Q12H (treatment, non-standard time)    famotidine (PF)  20 mg Intravenous Daily    levETIRAcetam (Keppra) IV (PEDS and ADULTS)  1,000 mg Intravenous Q12H    losartan  50 mg Per OG tube Daily    mannitol 20%  75 g Intravenous Once    polyethylene glycol  17 g Per NG tube TID    sennosides 8.8 mg/5 ml  5 mL Oral BID     Continuous Infusions:   dexmedeTOMIDine (Precedex) infusion (titrating) 1 mcg/kg/hr (12/31/23 0657)    fentanyl 100 mcg/hr (12/31/23 0518)    NORepinephrine bitartrate-D5W Stopped (12/20/23 1929)     PRN Meds:0.9%  NaCl infusion (for blood administration), acetaminophen, dextrose 10%, dextrose 10%, fentaNYL, glucagon (human recombinant), hydrALAZINE, insulin aspart U-100, labetalol, metoprolol, ondansetron, polyethylene glycol, sodium chloride 0.9%    Review of patient's allergies indicates:  No Known Allergies    Past Medical History:   Diagnosis Date    Arthritis     Atrial fibrillation     BPH (benign prostatic hyperplasia)     Cardiac arrest     Coronary artery disease     Cyst, kidney, acquired     Diverticulosis     Hyperlipidemia     Hypertension     MI (myocardial infarction)     Obesity     Steatosis of liver      Past Surgical History:  "  Procedure Laterality Date    A-V CARDIAC PACEMAKER INSERTION Right     CARDIAC CATHETERIZATION      COLONOSCOPY W/ BIOPSIES      CRANIECTOMY Right 12/20/2023    Procedure: CRANIECTOMY;  Surgeon: Artem Can MD;  Location: [facility];  Service: Neurosurgery;  Laterality: Right;    excision of colon         OBJECTIVE:     Vital Signs (Most Recent)  Temp: 97.8 °F (36.6 °C) (12/31/23 0800)  Pulse: 82 (12/31/23 0900)  Resp: 20 (12/31/23 0900)  BP: (!) 141/96 (12/31/23 0900)  SpO2: 99 % (12/31/23 0900)    Vital Signs Range (Last 24H):  Temp:  [97.3 °F (36.3 °C)-98.8 °F (37.1 °C)]   Pulse:  []   Resp:  [0-23]   BP: ()/()   SpO2:  [93 %-100 %]       Physical Exam:  General Intubated, sedated  GCS- 7T  E-1 V-1T, M-5    Eyes closed to pain  Vented s/p trach, non-verbal  Localizes R UE with no movement in L UE and b LE     Cranial Nerves PERRL, 3 to 2 mm, sluggish bilaterally     Wound R craniectomy site- full, but depressible, staples in place       Laboratory Results:  CBC without Differential:  Lab Results   Component Value Date    WBC 11.16 12/31/2023    HGB 10.8 (L) 12/31/2023    HCT 33.5 (L) 12/31/2023     12/31/2023    MCV 90.5 12/31/2023     Differential:   No results found for: "NEUTROABS", "LYMPHSABS", "BASOSABS", "MONOSABS"    Basic Metabolic Panel:  Lab Results   Component Value Date     (H) 12/31/2023    K 3.6 12/31/2023    BUN 22.1 12/31/2023    MG 2.10 12/31/2023    PHOS 2.9 12/31/2023     Coagulation Studies:  Lab Results   Component Value Date    INR 1.2 12/20/2023    INR 1.4 (H) 12/19/2023    INR 1.3 (H) 12/19/2023    PROTIME 14.9 (H) 12/20/2023    PROTIME 16.9 (H) 12/19/2023    PROTIME 13.3 08/14/2023     Lab Results   Component Value Date    PTT 28.5 12/20/2023     Urinalysis:  Lab Results   Component Value Date    PHURINE 5.5 08/26/2023    LEUKOCYTESUR 250 (A) 12/23/2023    UROBILINOGEN 2.0 (A) 12/23/2023        Radiology:    I have personally reviewed and evaluated the " following reports as well as radiographic studies:    CT head without contrast, 12/22/2023- postoperative changes s/p right craniectomy.  Right MCA infarct with areas of right intraparenchymal hemorrhage.  There is 5 mm of midline shift.      ASSESSMENT/PLAN:     Mr. Danile is a 68-year-old male who has a past medical history significant for hypertension, atrial fibrillation, coronary artery disease, s/p MI, s/p ICD for VFib arrest, BPH, fatty liver, and neuroendocrine carcinoma of small bowel.  He was previously taking Xarelto.  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  He is vented s/p tracheostomy on a Precedex gtt.  The patient has a GCS 7T and localizing in his right upper extremity with no movement in his other extremities.      Plan:     1.  Neuro checks have been reduced to Q2 hrs from Q1 hr.    2.  A CT head without contrast has been ordered for Tuesday, 01/02/2024.      3.  The patient has a helmet at the bedside, which needs to be worn whenever he is out of bed.      4.  He is currently on aspirin 81 mg daily and subcutaneous Lovenox.      5.  GI Medicine is following the patient is progress and proceed with a PEG as soon as Mr. Daniel is deemed stable for this procedure.      6.  The patient's staples will be removed by the nursing staff on Wednesday, 01/03/2024.      7.  Case management/ social work is making arrangements for Mr. Daniel to be eventually transferred to an inpatient rehab center.      8.  Neurosurgery will continue to follow the patient after his CT head without contrast has been reviewed.  Please do not hesitate to contact neurosurgery for any additional questions or concerns.        Rosa Schaefer MD  Neurosurgery

## 2024-01-01 LAB
POCT GLUCOSE: 115 MG/DL (ref 70–110)
POCT GLUCOSE: 116 MG/DL (ref 70–110)

## 2024-01-01 PROCEDURE — 25000003 PHARM REV CODE 250: Performed by: INTERNAL MEDICINE

## 2024-01-01 PROCEDURE — 20000000 HC ICU ROOM

## 2024-01-01 PROCEDURE — 99900026 HC AIRWAY MAINTENANCE (STAT)

## 2024-01-01 PROCEDURE — 63600175 PHARM REV CODE 636 W HCPCS

## 2024-01-01 PROCEDURE — 63600175 PHARM REV CODE 636 W HCPCS: Performed by: STUDENT IN AN ORGANIZED HEALTH CARE EDUCATION/TRAINING PROGRAM

## 2024-01-01 PROCEDURE — 94640 AIRWAY INHALATION TREATMENT: CPT

## 2024-01-01 PROCEDURE — 94761 N-INVAS EAR/PLS OXIMETRY MLT: CPT

## 2024-01-01 PROCEDURE — 27100171 HC OXYGEN HIGH FLOW UP TO 24 HOURS

## 2024-01-01 PROCEDURE — 94003 VENT MGMT INPAT SUBQ DAY: CPT

## 2024-01-01 PROCEDURE — 27200966 HC CLOSED SUCTION SYSTEM

## 2024-01-01 PROCEDURE — 63600175 PHARM REV CODE 636 W HCPCS: Performed by: INTERNAL MEDICINE

## 2024-01-01 PROCEDURE — 99900035 HC TECH TIME PER 15 MIN (STAT)

## 2024-01-01 PROCEDURE — 25000003 PHARM REV CODE 250

## 2024-01-01 PROCEDURE — 25000242 PHARM REV CODE 250 ALT 637 W/ HCPCS

## 2024-01-01 PROCEDURE — 25000003 PHARM REV CODE 250: Performed by: STUDENT IN AN ORGANIZED HEALTH CARE EDUCATION/TRAINING PROGRAM

## 2024-01-01 RX ORDER — IPRATROPIUM BROMIDE 0.5 MG/2.5ML
0.5 SOLUTION RESPIRATORY (INHALATION) EVERY 6 HOURS
Status: COMPLETED | OUTPATIENT
Start: 2024-01-01 | End: 2024-01-01

## 2024-01-01 RX ORDER — LEVALBUTEROL INHALATION SOLUTION 1.25 MG/3ML
1.25 SOLUTION RESPIRATORY (INHALATION) EVERY 6 HOURS PRN
Status: DISCONTINUED | OUTPATIENT
Start: 2024-01-01 | End: 2024-01-16 | Stop reason: HOSPADM

## 2024-01-01 RX ORDER — LEVALBUTEROL INHALATION SOLUTION 1.25 MG/3ML
1.25 SOLUTION RESPIRATORY (INHALATION) EVERY 6 HOURS
Status: COMPLETED | OUTPATIENT
Start: 2024-01-01 | End: 2024-01-01

## 2024-01-01 RX ORDER — IPRATROPIUM BROMIDE 0.5 MG/2.5ML
0.5 SOLUTION RESPIRATORY (INHALATION) EVERY 6 HOURS PRN
Status: DISCONTINUED | OUTPATIENT
Start: 2024-01-01 | End: 2024-01-16 | Stop reason: HOSPADM

## 2024-01-01 RX ADMIN — LEVALBUTEROL HYDROCHLORIDE 1.25 MG: 1.25 SOLUTION RESPIRATORY (INHALATION) at 07:01

## 2024-01-01 RX ADMIN — DEXMEDETOMIDINE HYDROCHLORIDE 1 MCG/KG/HR: 4 INJECTION INTRAVENOUS at 08:01

## 2024-01-01 RX ADMIN — LEVETIRACETAM INJECTION 1000 MG: 10 INJECTION INTRAVENOUS at 08:01

## 2024-01-01 RX ADMIN — LOSARTAN POTASSIUM 50 MG: 50 TABLET, FILM COATED ORAL at 09:01

## 2024-01-01 RX ADMIN — CARVEDILOL 3.12 MG: 3.12 TABLET, FILM COATED ORAL at 09:01

## 2024-01-01 RX ADMIN — DEXMEDETOMIDINE HYDROCHLORIDE 1 MCG/KG/HR: 4 INJECTION INTRAVENOUS at 06:01

## 2024-01-01 RX ADMIN — DOCUSATE SODIUM 100 MG: 50 LIQUID ORAL at 09:01

## 2024-01-01 RX ADMIN — DILTIAZEM HYDROCHLORIDE 240 MG: 60 TABLET, FILM COATED ORAL at 09:01

## 2024-01-01 RX ADMIN — POLYETHYLENE GLYCOL 3350 17 G: 17 POWDER, FOR SOLUTION ORAL at 09:01

## 2024-01-01 RX ADMIN — ENOXAPARIN SODIUM 120 MG: 150 INJECTION SUBCUTANEOUS at 10:01

## 2024-01-01 RX ADMIN — ENOXAPARIN SODIUM 120 MG: 150 INJECTION SUBCUTANEOUS at 09:01

## 2024-01-01 RX ADMIN — IPRATROPIUM BROMIDE 0.5 MG: 0.5 SOLUTION RESPIRATORY (INHALATION) at 07:01

## 2024-01-01 RX ADMIN — LEVETIRACETAM INJECTION 1000 MG: 10 INJECTION INTRAVENOUS at 09:01

## 2024-01-01 RX ADMIN — IPRATROPIUM BROMIDE 0.5 MG: 0.5 SOLUTION RESPIRATORY (INHALATION) at 12:01

## 2024-01-01 RX ADMIN — FENTANYL CITRATE 50 MCG: 50 INJECTION, SOLUTION INTRAMUSCULAR; INTRAVENOUS at 03:01

## 2024-01-01 RX ADMIN — DEXMEDETOMIDINE HYDROCHLORIDE 1 MCG/KG/HR: 4 INJECTION INTRAVENOUS at 11:01

## 2024-01-01 RX ADMIN — POLYETHYLENE GLYCOL 3350 17 G: 17 POWDER, FOR SOLUTION ORAL at 08:01

## 2024-01-01 RX ADMIN — FAMOTIDINE 20 MG: 10 INJECTION, SOLUTION INTRAVENOUS at 09:01

## 2024-01-01 RX ADMIN — LEVALBUTEROL HYDROCHLORIDE 1.25 MG: 1.25 SOLUTION RESPIRATORY (INHALATION) at 12:01

## 2024-01-01 RX ADMIN — FENTANYL CITRATE 50 MCG: 50 INJECTION, SOLUTION INTRAMUSCULAR; INTRAVENOUS at 02:01

## 2024-01-01 RX ADMIN — SENNOSIDES 5 ML: 8.8 LIQUID ORAL at 08:01

## 2024-01-01 RX ADMIN — ASPIRIN 81 MG CHEWABLE TABLET 81 MG: 81 TABLET CHEWABLE at 09:01

## 2024-01-01 RX ADMIN — HYDRALAZINE HYDROCHLORIDE 10 MG: 20 INJECTION INTRAMUSCULAR; INTRAVENOUS at 02:01

## 2024-01-01 RX ADMIN — FENTANYL CITRATE 50 MCG: 50 INJECTION, SOLUTION INTRAMUSCULAR; INTRAVENOUS at 04:01

## 2024-01-01 RX ADMIN — DEXMEDETOMIDINE HYDROCHLORIDE 1 MCG/KG/HR: 4 INJECTION INTRAVENOUS at 02:01

## 2024-01-01 RX ADMIN — DOCUSATE SODIUM 100 MG: 50 LIQUID ORAL at 08:01

## 2024-01-01 RX ADMIN — SENNOSIDES 5 ML: 8.8 LIQUID ORAL at 09:01

## 2024-01-01 RX ADMIN — CARVEDILOL 3.12 MG: 3.12 TABLET, FILM COATED ORAL at 08:01

## 2024-01-01 NOTE — NURSING
Nurses Note -- 4 Eyes      1/1/2024   1:22 AM      Skin assessed during: Q Shift Change      [] No Altered Skin Integrity Present    [x]Prevention Measures Documented      [x] Yes- Altered Skin Integrity Present or Discovered   [] LDA Added if Not in Epic (Describe Wound)   [] New Altered Skin Integrity was Present on Admit and Documented in LDA   [] Wound Image Taken    Wound Care Consulted? No    Attending Nurse:  Tani Johnson RN/Staff Member:  Tani SALES

## 2024-01-01 NOTE — PROGRESS NOTES
[facility] - [facility] ICU  Pulmonary Critical Care Note    Patient Name: Delio Daniel Jr.  MRN: [id number]  Admission Date: 12/19/2023  Hospital Length of Stay: 13 days  Code Status: Full Code  Attending Provider: KODI Drake MD  Primary Care Provider: Candy, Primary Doctor     Subjective:     HPI:   Delio Daniel Jr. is a 68 y.o. male with PMH of Afib (on Xarelto), HTN, CAD, CAD (STEMI 2003), HFpEF(55%), PM placement in 2017 for 2nd degree AVB replaced with dcICD 5/2018 for Vfib arrest in 3/2018 d/t prolonged QT and hypokalemia ; BPH, fatty liver, and neuroendocrine carcinoma of small bowel s/p resection 2018; presented to [facility] on 12/19 with complaints of L facial droop, slurred speech, L sided hemiparesis, and fixed R sided gaze. His last known normal was 9:15 per EMS. Stroke protocol in ED initiated. Unofficial CT head showed possible dense R MCA. Pt taken to cath lab for BRAD thrombectomy. Admitted to ICU for post-operative care and monitoring.     Hospital Course/Significant events:  12/19/2023 - Admitted to ICU s/p right internal carotid artery thrombectomy  12/20/2023 - s/p craniectomy  12/29/23 - s/p tracheotomy      24 Hour Interval History:  NAEON. POD 3 s/p tracheostomy placement. GI following with plans for EGD and PEG placement on 1/2/24.      Patient had 1st bowel movement overnight since 12/19.  Black/dark green emesis improving with reduction rate of feeds.     Patient remains on vent with settings rate of 20, PEEP 8, and FiO2 30, we will postpone mechanical vent wean s/p   Peg placement tomorrow.        Past Medical History:   Diagnosis Date    Arthritis     Atrial fibrillation     BPH (benign prostatic hyperplasia)     Cardiac arrest     Coronary artery disease     Cyst, kidney, acquired     Diverticulosis     Hyperlipidemia     Hypertension     MI (myocardial infarction)     Obesity     Steatosis of liver        Past Surgical History:   Procedure Laterality Date    A-V CARDIAC  PACEMAKER INSERTION Right     CARDIAC CATHETERIZATION      COLONOSCOPY W/ BIOPSIES      CRANIECTOMY Right 12/20/2023    Procedure: CRANIECTOMY;  Surgeon: Artem Can MD;  Location: [facility];  Service: Neurosurgery;  Laterality: Right;    excision of colon         Social History     Socioeconomic History    Marital status:     Number of children: 9   Occupational History    Occupation: retired   Tobacco Use    Smoking status: Former    Smokeless tobacco: Never   Substance and Sexual Activity    Alcohol use: Not Currently    Drug use: Not Currently    Sexual activity: Not Currently     Partners: Female     Social Determinants of Health     Financial Resource Strain: Low Risk  (10/19/2022)    Overall Financial Resource Strain (CARDIA)     Difficulty of Paying Living Expenses: Not hard at all   Food Insecurity: No Food Insecurity (10/19/2022)    Hunger Vital Sign     Worried About Running Out of Food in the Last Year: Never true     Ran Out of Food in the Last Year: Never true   Transportation Needs: No Transportation Needs (10/19/2022)    PRAPARE - Transportation     Lack of Transportation (Medical): No     Lack of Transportation (Non-Medical): No   Physical Activity: Sufficiently Active (10/19/2022)    Exercise Vital Sign     Days of Exercise per Week: 6 days     Minutes of Exercise per Session: 60 min   Stress: No Stress Concern Present (10/19/2022)    [employer] [place] [employer] - Occupational Stress Questionnaire     Feeling of Stress : Not at all   Social Connections: Unknown (10/19/2022)    Social Connection and Isolation Panel [NHANES]     Frequency of Communication with Friends and Family: More than three times a week     Frequency of Social Gatherings with Friends and Family: More than three times a week     Attends Denominational Services: More than 4 times per year     Active Member of Clubs or Organizations: No     Attends Club or Organization Meetings: Never   Housing Stability: Low Risk   (10/19/2022)    Housing Stability Vital Sign     Unable to Pay for Housing in the Last Year: No     Number of Places Lived in the Last Year: 1     Unstable Housing in the Last Year: No           Current Outpatient Medications   Medication Instructions    albuterol (PROVENTIL/VENTOLIN HFA) 90 mcg/actuation inhaler 2 puffs, Inhalation, Every 6 hours PRN, Rescue    aspirin 81 MG Chew chew and swallow 1 tablet by mouth daily    atorvastatin (LIPITOR) 40 mg, Oral, Daily    cetirizine (ZYRTEC) 10 mg, Oral, Daily    diltiaZEM (CARDIZEM CD) 360 mg, Oral, Daily    losartan (COZAAR) 50 mg, Oral, Daily    metoprolol succinate (TOPROL-XL) 200 mg, Oral, 2 times daily    omeprazole (PRILOSEC) 20 mg, Oral, Daily, One tab by mouth daily    potassium chloride (KLOR-CON) 20 mEq Pack 20 mEq, Oral, Daily    spironolactone (ALDACTONE) 50 mg, Oral, Daily    torsemide (DEMADEX) 10 mg, Oral, Daily    vitamin D (VITAMIN D3) 1,000 Units, Oral, Daily    XARELTO 20 mg Tab TAKE 1 TABLET BY MOUTH DAILY with SUPPER       Current Inpatient Medications   aspirin  81 mg Per OG tube Daily    carvediloL  3.125 mg Oral BID    diltiaZEM  240 mg Per OG tube Daily    docusate  100 mg Oral BID    enoxparin  1 mg/kg Subcutaneous Q12H (treatment, non-standard time)    famotidine (PF)  20 mg Intravenous Daily    ipratropium  0.5 mg Nebulization Q6H    levalbuterol  1.25 mg Nebulization Q6H    levETIRAcetam (Keppra) IV (PEDS and ADULTS)  1,000 mg Intravenous Q12H    losartan  50 mg Per OG tube Daily    mannitol 20%  75 g Intravenous Once    polyethylene glycol  17 g Per NG tube TID    sennosides 8.8 mg/5 ml  5 mL Oral BID       Current Intravenous Infusions   dexmedeTOMIDine (Precedex) infusion (titrating) 1.4 mcg/kg/hr (01/01/24 0414)    fentanyl Stopped (12/31/23 0634)    NORepinephrine bitartrate-D5W Stopped (12/20/23 1929)         Review of Systems   Unable to perform ROS: Critical illness   All other systems reviewed and are negative.         Objective:        Intake/Output Summary (Last 24 hours) at 1/1/2024 0602  Last data filed at 1/1/2024 0552  Gross per 24 hour   Intake 1674.32 ml   Output 2530 ml   Net -855.68 ml           Vital Signs (Most Recent):  Temp: 97.9 °F (36.6 °C) (01/01/24 0400)  Pulse: 94 (01/01/24 0500)  Resp: (!) 26 (01/01/24 0500)  BP: 138/84 (01/01/24 0500)  SpO2: 99 % (01/01/24 0500)  Body mass index is 36.88 kg/m².  Weight: 119.9 kg (264 lb 5.3 oz) Vital Signs (24h Range):  Temp:  [97.7 °F (36.5 °C)-98.7 °F (37.1 °C)] 97.9 °F (36.6 °C)  Pulse:  [60-95] 94  Resp:  [0-31] 26  SpO2:  [92 %-100 %] 99 %  BP: ()/() 138/84     Physical Exam  General: Intubated  HEENT: Surgical staples intact; pinpoint pupils, sluggish; nasal and oral mucosa moist and clear, trach in place  Pulm: CTA bilaterally, mechanically ventilated  CV: Irregular w/o murmurs or gallops; non-pitting edema in upper extremities, 1+ edema in BLE noted  GI:  Bowel sounds diminished, distended abdomen without rigidity/guarding (patient is sedated)  MSK: No obvious deformities; diffuse anasarca  Neuro: Moving right arm intermittently, minimally responsive    Lines/Drains/Airways       Drain  Duration                  Urethral Catheter 12/20/23 1007 11 days         NG/OG Tube 12/29/23 1459 Left mouth 2 days              Airway  Duration             Adult Surgical Airway 12/29/23 1229 2 days              Peripheral Intravenous Line  Duration                  Peripheral IV - Single Lumen 12/25/23 1705 20 G Anterior;Left;Proximal Forearm 6 days         Midline Catheter Insertion/Assessment  - Single Lumen 12/29/23 1400 Right cephalic vein (lateral side of arm) 2 days                    Significant Labs:    Lab Results   Component Value Date    WBC 11.16 12/31/2023    HGB 10.8 (L) 12/31/2023    HCT 33.5 (L) 12/31/2023    MCV 90.5 12/31/2023     12/31/2023           BMP  Lab Results   Component Value Date     (H) 12/31/2023    K 3.6 12/31/2023    CHLORIDE 114 (H)  12/31/2023    CO2 23 12/31/2023    BUN 22.1 12/31/2023    CREATININE 0.87 12/31/2023    CALCIUM 7.9 (L) 12/31/2023    AGAP 8.0 12/20/2023    EGFRNONAA 61 04/23/2022         ABG  Recent Labs   Lab 12/31/23  0806   PH 7.450   PO2 89.0   PCO2 40.0   HCO3 27.8*   POCBASEDEF 3.50*         Mechanical Ventilation Support:  Vent Mode: A/C (01/01/24 0447)  Set Rate: 20 BPM (01/01/24 0447)  Vt Set: 460 mL (01/01/24 0447)  PEEP/CPAP: 5 cmH20 (01/01/24 0447)  Oxygen Concentration (%): 40 (01/01/24 0447)  Peak Airway Pressure: 22 cmH20 (01/01/24 0447)  Total Ve: 11.1 L/m (01/01/24 0447)  F/VT Ratio<105 (RSBI): (!) 56.56 (01/01/24 0447)      Significant Imaging:  I have reviewed the pertinent imaging within the past 24 hours.        Assessment/Plan:     Assessment  CVA s/p right ICA and MCA M3  branch thrombectomy s/p craniectomy with hemorrhagic conversion  Superficial thrombosis in left cephalic vein  Confirmed by DVT U/S on 12/26/2023  Acute hypoxic respiratory failure requiring intubation  Atrial fibrillation w/ RVR-rate now controlled.  Onychomycosis  HX of CAD, HTN, HLD, ADA      Plan  -Plan for EGD and PEG on 1/2/24 with GI;   -will begin efforts in mechanical ventilation wean with trach  -Patient remains sedated on Precedex, wean as tolerated; try to avoid further opioid for sedation;   -BM achieved, large volume per nursing; last BM prior 12/19, will continue current BM regimen couple days;  -abdominal distention slightly worsened since yesterday, continue to monitor closely  -CXR, abdominal XR, and abdominal U/S from yesterday reviewed-unremarkable for acute pathology  -scheduled Miralax TID and Senna BID until BM achievement; continue to monitor for peritoneal signs/urine output  -continue trickling of tube feeds given tolerance  -Appreciate the input of all consultants  -Remains on full-dose Lovenox for left cephalic vein thrombosis  -urine studies, osmoles, etc. Ordered  -renal indices/morning labs pending - further  diuresis consideration pending results    DVT Prophylaxis:  Full-dose Lovenox  GI Prophylaxis:  Famotidine     35 minutes of critical care was time spent personally by me on the following activities: development of treatment plan with patient or surrogate and bedside caregivers, discussions with consultants, evaluation of patient's response to treatment, examination of patient, ordering and performing treatments and interventions, ordering and review of laboratory studies, ordering and review of radiographic studies, pulse oximetry, re-evaluation of patient's condition.  This critical care time did not overlap with that of any other provider or involve time for any procedures.     Christiano Taylor MD  Internal Medicine - PGY-2    Pulmonary Critical Care Medicine  [facility] ICU  DOS: 01/01/2024

## 2024-01-02 LAB
ALBUMIN SERPL-MCNC: 2.1 G/DL (ref 3.4–4.8)
ALBUMIN/GLOB SERPL: 0.7 RATIO (ref 1.1–2)
ALP SERPL-CCNC: 50 UNIT/L (ref 40–150)
ALT SERPL-CCNC: 39 UNIT/L (ref 0–55)
AST SERPL-CCNC: 35 UNIT/L (ref 5–34)
BASOPHILS # BLD AUTO: 0.03 X10(3)/MCL
BASOPHILS NFR BLD AUTO: 0.3 %
BILIRUB SERPL-MCNC: 0.8 MG/DL
BUN SERPL-MCNC: 19.6 MG/DL (ref 8.4–25.7)
CALCIUM SERPL-MCNC: 7.9 MG/DL (ref 8.8–10)
CHLORIDE SERPL-SCNC: 114 MMOL/L (ref 98–107)
CO2 SERPL-SCNC: 26 MMOL/L (ref 23–31)
CREAT SERPL-MCNC: 0.78 MG/DL (ref 0.73–1.18)
EOSINOPHIL # BLD AUTO: 0.21 X10(3)/MCL (ref 0–0.9)
EOSINOPHIL NFR BLD AUTO: 2.4 %
ERYTHROCYTE [DISTWIDTH] IN BLOOD BY AUTOMATED COUNT: 15.2 % (ref 11.5–17)
GFR SERPLBLD CREATININE-BSD FMLA CKD-EPI: >60 MLS/MIN/1.73/M2
GLOBULIN SER-MCNC: 3.2 GM/DL (ref 2.4–3.5)
GLUCOSE SERPL-MCNC: 137 MG/DL (ref 82–115)
HCT VFR BLD AUTO: 33.4 % (ref 42–52)
HGB BLD-MCNC: 10.4 G/DL (ref 14–18)
IMM GRANULOCYTES # BLD AUTO: 0.17 X10(3)/MCL (ref 0–0.04)
IMM GRANULOCYTES NFR BLD AUTO: 1.9 %
INR PPP: 1.1
LYMPHOCYTES # BLD AUTO: 1.3 X10(3)/MCL (ref 0.6–4.6)
LYMPHOCYTES NFR BLD AUTO: 14.9 %
MAGNESIUM SERPL-MCNC: 2.2 MG/DL (ref 1.6–2.6)
MCH RBC QN AUTO: 28.7 PG (ref 27–31)
MCHC RBC AUTO-ENTMCNC: 31.1 G/DL (ref 33–36)
MCV RBC AUTO: 92.3 FL (ref 80–94)
MONOCYTES # BLD AUTO: 0.46 X10(3)/MCL (ref 0.1–1.3)
MONOCYTES NFR BLD AUTO: 5.3 %
NEUTROPHILS # BLD AUTO: 6.58 X10(3)/MCL (ref 2.1–9.2)
NEUTROPHILS NFR BLD AUTO: 75.2 %
NRBC BLD AUTO-RTO: 0.5 %
PHOSPHATE SERPL-MCNC: 2.6 MG/DL (ref 2.3–4.7)
PLATELET # BLD AUTO: 285 X10(3)/MCL (ref 130–400)
PMV BLD AUTO: 11.3 FL (ref 7.4–10.4)
POCT GLUCOSE: 103 MG/DL (ref 70–110)
POCT GLUCOSE: 129 MG/DL (ref 70–110)
POTASSIUM SERPL-SCNC: 3.7 MMOL/L (ref 3.5–5.1)
PROT SERPL-MCNC: 5.3 GM/DL (ref 5.8–7.6)
PROTHROMBIN TIME: 14.4 SECONDS (ref 12.5–14.5)
RBC # BLD AUTO: 3.62 X10(6)/MCL (ref 4.7–6.1)
SODIUM SERPL-SCNC: 149 MMOL/L (ref 136–145)
WBC # SPEC AUTO: 8.75 X10(3)/MCL (ref 4.5–11.5)

## 2024-01-02 PROCEDURE — 63600175 PHARM REV CODE 636 W HCPCS

## 2024-01-02 PROCEDURE — 99024 POSTOP FOLLOW-UP VISIT: CPT | Mod: ,,, | Performed by: NEUROLOGICAL SURGERY

## 2024-01-02 PROCEDURE — 25000003 PHARM REV CODE 250

## 2024-01-02 PROCEDURE — 63600175 PHARM REV CODE 636 W HCPCS: Performed by: INTERNAL MEDICINE

## 2024-01-02 PROCEDURE — 63600175 PHARM REV CODE 636 W HCPCS: Performed by: STUDENT IN AN ORGANIZED HEALTH CARE EDUCATION/TRAINING PROGRAM

## 2024-01-02 PROCEDURE — 25000003 PHARM REV CODE 250: Performed by: INTERNAL MEDICINE

## 2024-01-02 PROCEDURE — 0DH63UZ INSERTION OF FEEDING DEVICE INTO STOMACH, PERCUTANEOUS APPROACH: ICD-10-PCS | Performed by: SPECIALIST

## 2024-01-02 PROCEDURE — 94003 VENT MGMT INPAT SUBQ DAY: CPT

## 2024-01-02 PROCEDURE — 27100171 HC OXYGEN HIGH FLOW UP TO 24 HOURS

## 2024-01-02 PROCEDURE — 85610 PROTHROMBIN TIME: CPT | Performed by: PHYSICIAN ASSISTANT

## 2024-01-02 PROCEDURE — 94761 N-INVAS EAR/PLS OXIMETRY MLT: CPT

## 2024-01-02 PROCEDURE — 80053 COMPREHEN METABOLIC PANEL: CPT

## 2024-01-02 PROCEDURE — 85025 COMPLETE CBC W/AUTO DIFF WBC: CPT

## 2024-01-02 PROCEDURE — 20000000 HC ICU ROOM

## 2024-01-02 PROCEDURE — 83735 ASSAY OF MAGNESIUM: CPT

## 2024-01-02 PROCEDURE — 43246 EGD PLACE GASTROSTOMY TUBE: CPT | Performed by: INTERNAL MEDICINE

## 2024-01-02 PROCEDURE — 99900026 HC AIRWAY MAINTENANCE (STAT)

## 2024-01-02 PROCEDURE — 99900035 HC TECH TIME PER 15 MIN (STAT)

## 2024-01-02 PROCEDURE — 25000003 PHARM REV CODE 250: Performed by: STUDENT IN AN ORGANIZED HEALTH CARE EDUCATION/TRAINING PROGRAM

## 2024-01-02 PROCEDURE — 27201423 OPTIME MED/SURG SUP & DEVICES STERILE SUPPLY: Performed by: INTERNAL MEDICINE

## 2024-01-02 PROCEDURE — 84100 ASSAY OF PHOSPHORUS: CPT

## 2024-01-02 RX ORDER — CEFAZOLIN SODIUM 1 G/3ML
INJECTION, POWDER, FOR SOLUTION INTRAMUSCULAR; INTRAVENOUS
Status: COMPLETED
Start: 2024-01-02 | End: 2024-01-02

## 2024-01-02 RX ADMIN — LOSARTAN POTASSIUM 50 MG: 50 TABLET, FILM COATED ORAL at 10:01

## 2024-01-02 RX ADMIN — ASPIRIN 81 MG CHEWABLE TABLET 81 MG: 81 TABLET CHEWABLE at 10:01

## 2024-01-02 RX ADMIN — DEXMEDETOMIDINE HYDROCHLORIDE 0.8 MCG/KG/HR: 4 INJECTION INTRAVENOUS at 08:01

## 2024-01-02 RX ADMIN — LEVETIRACETAM INJECTION 1000 MG: 10 INJECTION INTRAVENOUS at 10:01

## 2024-01-02 RX ADMIN — LEVETIRACETAM INJECTION 1000 MG: 10 INJECTION INTRAVENOUS at 08:01

## 2024-01-02 RX ADMIN — DOCUSATE SODIUM 100 MG: 50 LIQUID ORAL at 08:01

## 2024-01-02 RX ADMIN — POLYETHYLENE GLYCOL 3350 17 G: 17 POWDER, FOR SOLUTION ORAL at 08:01

## 2024-01-02 RX ADMIN — DILTIAZEM HYDROCHLORIDE 240 MG: 60 TABLET, FILM COATED ORAL at 10:01

## 2024-01-02 RX ADMIN — FENTANYL CITRATE 50 MCG: 50 INJECTION, SOLUTION INTRAMUSCULAR; INTRAVENOUS at 04:01

## 2024-01-02 RX ADMIN — DEXMEDETOMIDINE HYDROCHLORIDE 1 MCG/KG/HR: 4 INJECTION INTRAVENOUS at 10:01

## 2024-01-02 RX ADMIN — FAMOTIDINE 20 MG: 10 INJECTION, SOLUTION INTRAVENOUS at 10:01

## 2024-01-02 RX ADMIN — CEFAZOLIN 1000 MG: 330 INJECTION, POWDER, FOR SOLUTION INTRAMUSCULAR; INTRAVENOUS at 10:01

## 2024-01-02 RX ADMIN — DEXMEDETOMIDINE HYDROCHLORIDE 1 MCG/KG/HR: 4 INJECTION INTRAVENOUS at 06:01

## 2024-01-02 RX ADMIN — ENOXAPARIN SODIUM 120 MG: 150 INJECTION SUBCUTANEOUS at 10:01

## 2024-01-02 RX ADMIN — CARVEDILOL 3.12 MG: 3.12 TABLET, FILM COATED ORAL at 10:01

## 2024-01-02 RX ADMIN — DEXMEDETOMIDINE HYDROCHLORIDE 1 MCG/KG/HR: 4 INJECTION INTRAVENOUS at 02:01

## 2024-01-02 RX ADMIN — SENNOSIDES 5 ML: 8.8 LIQUID ORAL at 08:01

## 2024-01-02 RX ADMIN — DEXMEDETOMIDINE HYDROCHLORIDE 1 MCG/KG/HR: 4 INJECTION INTRAVENOUS at 12:01

## 2024-01-02 RX ADMIN — CARVEDILOL 3.12 MG: 3.12 TABLET, FILM COATED ORAL at 08:01

## 2024-01-02 RX ADMIN — HYDRALAZINE HYDROCHLORIDE 10 MG: 20 INJECTION INTRAMUSCULAR; INTRAVENOUS at 10:01

## 2024-01-02 RX ADMIN — DOCUSATE SODIUM 100 MG: 50 LIQUID ORAL at 10:01

## 2024-01-02 NOTE — PROGRESS NOTES
[facility] - [facility]  Pulmonary Critical Care Note    Patient Name: Delio Daniel Jr.  MRN: [id number]  Admission Date: 12/19/2023  Hospital Length of Stay: 14 days  Code Status: Full Code  Attending Provider: KODI Drake MD  Primary Care Provider: Candy, Primary Doctor     Subjective:     HPI:   Delio Daniel Jr. is a 68 y.o. 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 His last known normal was 9:15 per EMS. Stroke protocol in ED initiated. Unofficial CT head showed possible dense R MCA. Pt taken to cath lab for BRAD thrombectomy. Admitted to ICU for post-operative care and monitoring.     Hospital Course/Significant events:  12/19/2023 - Admitted to ICU s/p right internal carotid artery thrombectomy  12/20/2023 - s/p craniectomy  12/29/23 - s/p tracheotomy      24 Hour Interval History:  NAEO.  Patient remains on vent at times he is able to tolerate CPAP.  He does not follow commands.  GI to perform PEG tube placement today.  Remains on minimal sedation, 0.2 Precedex.  Hypernatremia worsened this morning labs suggestive of extrarenal process, likely 2/2 NG tube as well as lack of intake as patient is NPO for current PEG tube placement.  Will increase free water flushes at this time.        Past Medical History:   Diagnosis Date    Arthritis     Atrial fibrillation     BPH (benign prostatic hyperplasia)     Cardiac arrest     Coronary artery disease     Cyst, kidney, acquired     Diverticulosis     Hyperlipidemia     Hypertension     MI (myocardial infarction)     Obesity     Steatosis of liver        Past Surgical History:   Procedure  Laterality Date    A-V CARDIAC PACEMAKER INSERTION Right     CARDIAC CATHETERIZATION      COLONOSCOPY W/ BIOPSIES      CRANIECTOMY Right 12/20/2023    Procedure: CRANIECTOMY;  Surgeon: Artem Can MD;  Location: [facility] OR;  Service: Neurosurgery;  Laterality: Right;    excision of colon      TRACHEOSTOMY N/A 12/29/2023    Procedure: CREATION, TRACHEOSTOMY;  Surgeon: Patricia Winslow MD;  Location: [facility] OR;  Service: ENT;  Laterality: N/A;  REQ 1130 //  NEEDS 2 SCRUBS       Social History     Socioeconomic History    Marital status:     Number of children: 9   Occupational History    Occupation: retired   Tobacco Use    Smoking status: Former    Smokeless tobacco: Never   Substance and Sexual Activity    Alcohol use: Not Currently    Drug use: Not Currently    Sexual activity: Not Currently     Partners: Female     Social Determinants of Health     Financial Resource Strain: Low Risk  (10/19/2022)    Overall Financial Resource Strain (CARDIA)     Difficulty of Paying Living Expenses: Not hard at all   Food Insecurity: No Food Insecurity (10/19/2022)    Hunger Vital Sign     Worried About Running Out of Food in the Last Year: Never true     Ran Out of Food in the Last Year: Never true   Transportation Needs: No Transportation Needs (10/19/2022)    PRAPARE - Transportation     Lack of Transportation (Medical): No     Lack of Transportation (Non-Medical): No   Physical Activity: Sufficiently Active (10/19/2022)    Exercise Vital Sign     Days of Exercise per Week: 6 days     Minutes of Exercise per Session: 60 min   Stress: No Stress Concern Present (10/19/2022)    [employer] [place] [employer] - Occupational Stress Questionnaire     Feeling of Stress : Not at all   Social Connections: Unknown (10/19/2022)    Social Connection and Isolation Panel [NHANES]     Frequency of Communication with Friends and Family: More than three times a week     Frequency of Social Gatherings with Friends and Family:  More than three times a week     Attends Protestant Services: More than 4 times per year     Active Member of Clubs or Organizations: No     Attends Club or Organization Meetings: Never   Housing Stability: Low Risk  (10/19/2022)    Housing Stability Vital Sign     Unable to Pay for Housing in the Last Year: No     Number of Places Lived in the Last Year: 1     Unstable Housing in the Last Year: No           Current Outpatient Medications   Medication Instructions    albuterol (PROVENTIL/VENTOLIN HFA) 90 mcg/actuation inhaler 2 puffs, Inhalation, Every 6 hours PRN, Rescue    aspirin 81 MG Chew chew and swallow 1 tablet by mouth daily    atorvastatin (LIPITOR) 40 mg, Oral, Daily    cetirizine (ZYRTEC) 10 mg, Oral, Daily    diltiaZEM (CARDIZEM CD) 360 mg, Oral, Daily    losartan (COZAAR) 50 mg, Oral, Daily    metoprolol succinate (TOPROL-XL) 200 mg, Oral, 2 times daily    omeprazole (PRILOSEC) 20 mg, Oral, Daily, One tab by mouth daily    potassium chloride (KLOR-CON) 20 mEq Pack 20 mEq, Oral, Daily    spironolactone (ALDACTONE) 50 mg, Oral, Daily    torsemide (DEMADEX) 10 mg, Oral, Daily    vitamin D (VITAMIN D3) 1,000 Units, Oral, Daily    XARELTO 20 mg Tab TAKE 1 TABLET BY MOUTH DAILY with SUPPER       Current Inpatient Medications   aspirin  81 mg Per OG tube Daily    carvediloL  3.125 mg Oral BID    diltiaZEM  240 mg Per OG tube Daily    docusate  100 mg Oral BID    enoxparin  1 mg/kg Subcutaneous Q12H (treatment, non-standard time)    famotidine (PF)  20 mg Intravenous Daily    levETIRAcetam (Keppra) IV (PEDS and ADULTS)  1,000 mg Intravenous Q12H    losartan  50 mg Per OG tube Daily    mannitol 20%  75 g Intravenous Once    polyethylene glycol  17 g Per NG tube TID    sennosides 8.8 mg/5 ml  5 mL Oral BID       Current Intravenous Infusions   dexmedeTOMIDine (Precedex) infusion (titrating) 1 mcg/kg/hr (01/02/24 1009)    fentanyl Stopped (12/31/23 0634)    NORepinephrine bitartrate-D5W Stopped (12/20/23 1929)          Review of Systems   Unable to perform ROS: Critical illness   All other systems reviewed and are negative.         Objective:       Intake/Output Summary (Last 24 hours) at 1/2/2024 1020  Last data filed at 1/2/2024 0800  Gross per 24 hour   Intake 2052.54 ml   Output 920 ml   Net 1132.54 ml           Vital Signs (Most Recent):  Temp: 98.7 °F (37.1 °C) (01/02/24 0800)  Pulse: 70 (01/02/24 0800)  Resp: (!) 21 (01/02/24 0800)  BP: (!) 144/97 (01/02/24 1000)  SpO2: 99 % (01/02/24 0800)  Body mass index is 36.88 kg/m².  Weight: 119.9 kg (264 lb 5.3 oz) Vital Signs (24h Range):  Temp:  [98.7 °F (37.1 °C)-99.9 °F (37.7 °C)] 98.7 °F (37.1 °C)  Pulse:  [] 70  Resp:  [12-30] 21  SpO2:  [97 %-100 %] 99 %  BP: (116-162)/() 144/97     Physical Exam  General: No acute distress.  Sedated  HEENT: Normocephalic, atraumatic. Face symmetric.  ET tube in place  Cardiovascular: Regular rate & rhythm  Pulmonary: Bilateral symmetric chest rise, ET tube in place, patient mechanically ventilated  Abdominal:  nondistended  Extremities: No clubbing or cyanosis.  1+ pitting edema BLE  Skin:  Exposed skin is warm & dry.  Neuro:   Patient is currently intubated and sedated full neurological exam can not be completed at this time.  Patient has non purposeful movements of RUE, does not withdraw to pain, does not follow commands.      Lines/Drains/Airways       Drain  Duration                  Urethral Catheter 12/20/23 1007 13 days         NG/OG Tube 12/29/23 1459 Left mouth 3 days              Airway  Duration             Adult Surgical Airway 12/29/23 1229 3 days              Peripheral Intravenous Line  Duration                  Peripheral IV - Single Lumen 12/25/23 1705 20 G Anterior;Left;Proximal Forearm 7 days         Midline Catheter Insertion/Assessment  - Single Lumen 12/29/23 1400 Right cephalic vein (lateral side of arm) 3 days                    Significant Labs:    Lab Results   Component Value Date    WBC 8.75  01/02/2024    HGB 10.4 (L) 01/02/2024    HCT 33.4 (L) 01/02/2024    MCV 92.3 01/02/2024     01/02/2024           BMP  Lab Results   Component Value Date     (H) 01/02/2024    K 3.7 01/02/2024    CHLORIDE 114 (H) 01/02/2024    CO2 26 01/02/2024    BUN 19.6 01/02/2024    CREATININE 0.78 01/02/2024    CALCIUM 7.9 (L) 01/02/2024    AGAP 8.0 12/20/2023    EGFRNONAA 61 04/23/2022         ABG  Recent Labs   Lab 12/31/23 0806   PH 7.450   PO2 89.0   PCO2 40.0   HCO3 27.8*   POCBASEDEF 3.50*         Mechanical Ventilation Support:  Vent Mode: A/C (01/02/24 0800)  Set Rate: 20 BPM (01/02/24 0800)  Vt Set: 460 mL (01/02/24 0800)  Pressure Support: 10 cmH20 (01/01/24 1200)  PEEP/CPAP: 5 cmH20 (01/02/24 0800)  Oxygen Concentration (%): 40 (01/02/24 0800)  Peak Airway Pressure: 28 cmH20 (01/02/24 0800)  Total Ve: 10.1 L/m (01/02/24 0800)  F/VT Ratio<105 (RSBI): (!) 45.55 (01/02/24 0800)      Significant Imaging:  I have reviewed the pertinent imaging within the past 24 hours.        Assessment/Plan:     Assessment  CVA s/p right ICA and MCA M3  branch thrombectomy s/p craniectomy with hemorrhagic conversion  Superficial thrombosis in left cephalic vein  Confirmed by DVT U/S on 12/26/2023  Acute hypoxic respiratory failure requiring intubation  Atrial fibrillation w/ RVR-rate now controlled.  Onychomycosis  HX of CAD, HTN, HLD, ADA  Hypernatremia  Urine osmolality indicative of extrarenal process, likely 2/2 NG tube as well as lack of intake      Plan  -GI to place PEG tube today  -will begin efforts in mechanical ventilation wean with trach  -continue to wean sedation, currently only on 0.2 Precedex  -continue Miralax TID and Senna BID until BM achievement; continue to monitor for peritoneal signs/urine output  -resume tube feeds when able, with plans to advance once PEG tube is placed and ready for use  -will need to increase patient's free water flushes once patient is no longer NPO due to his hypernatremia, we  will consider any starting fluids as well  -Appreciate the input of all consultants  -Remains on full-dose Lovenox for left cephalic vein thrombosis    DVT Prophylaxis:  Full-dose Lovenox  GI Prophylaxis:  Famotidine     35 minutes of critical care was time spent personally by me on the following activities: development of treatment plan with patient or surrogate and bedside caregivers, discussions with consultants, evaluation of patient's response to treatment, examination of patient, ordering and performing treatments and interventions, ordering and review of laboratory studies, ordering and review of radiographic studies, pulse oximetry, re-evaluation of patient's condition.  This critical care time did not overlap with that of any other provider or involve time for any procedures.     Isaac Zepeda MD  Pulmonary Critical Care Medicine  [facility] - [facility]  DOS: 01/02/2024

## 2024-01-02 NOTE — PROGRESS NOTES
POD#13 right craniectomy for CVA due to thrombosis of right middle cerebral artery     Major issues overnight   Scheduled for PEG this morning.    Sodium 149.    Remains on mechanical ventilatory support via trach.    CT head without contrast performed today-improve right cerebral edema, hemorrhagic foci, mass effect and no residual midline shift.      Neurologically unchanged.  Does not really respond to verbal stim.  Does not follow commands.  Continues with non purposeful movements to right upper extremity.    Incision open to air  Flap full but not tense.  Unchanged.        Plan:     Bone flap precautions  Helmet is at bedside    Continue BP parameters per neurology  Keppra BID, seizure precautions  SCDs for DVT prophylaxis, on full-dose Lovenox for cephalic vein thrombosis  Helmet at bedside.  To be worn when out of bed.  PTOT if patient becomes appropriate by chance.      Staple removal on 01/03/2023.  This was placed in the EMR for continuity of care.  Placement LTAC versus nursing home.        Post-Op Info:  Procedure(s) (LRB):  PEG (N/A)   Day of Surgery      Medications:  Continuous Infusions:   dexmedeTOMIDine (Precedex) infusion (titrating) 1 mcg/kg/hr (01/02/24 1009)    fentanyl Stopped (12/31/23 0634)    NORepinephrine bitartrate-D5W Stopped (12/20/23 1929)     Scheduled Meds:   aspirin  81 mg Per OG tube Daily    carvediloL  3.125 mg Oral BID    diltiaZEM  240 mg Per OG tube Daily    docusate  100 mg Oral BID    enoxparin  1 mg/kg Subcutaneous Q12H (treatment, non-standard time)    famotidine (PF)  20 mg Intravenous Daily    levETIRAcetam (Keppra) IV (PEDS and ADULTS)  1,000 mg Intravenous Q12H    losartan  50 mg Per OG tube Daily    mannitol 20%  75 g Intravenous Once    polyethylene glycol  17 g Per NG tube TID    sennosides 8.8 mg/5 ml  5 mL Oral BID     PRN Meds:0.9%  NaCl infusion (for blood administration), acetaminophen, dextrose 10%, dextrose 10%, fentaNYL, glucagon (human recombinant),  hydrALAZINE, insulin aspart U-100, ipratropium, labetalol, levalbuterol, metoprolol, ondansetron, polyethylene glycol, sodium chloride 0.9%     Review of Systems  Objective:     Weight: 119.9 kg (264 lb 5.3 oz)  Body mass index is 36.88 kg/m².  Vital Signs (Most Recent):  Temp: 98.7 °F (37.1 °C) (01/02/24 0800)  Pulse: 70 (01/02/24 0800)  Resp: (!) 21 (01/02/24 0800)  BP: (!) 144/97 (01/02/24 1000)  SpO2: 99 % (01/02/24 0800) Vital Signs (24h Range):  Temp:  [98.7 °F (37.1 °C)-99.9 °F (37.7 °C)] 98.7 °F (37.1 °C)  Pulse:  [] 70  Resp:  [12-30] 21  SpO2:  [97 %-100 %] 99 %  BP: (116-162)/() 144/97     Date 01/02/24 0700 - 01/03/24 0659   Shift 7399-2658 4045-2744 4053-3813 24 Hour Total   INTAKE   I.V.(mL/kg) 28(0.2)   28(0.2)   Shift Total(mL/kg) 28(0.2)   28(0.2)   OUTPUT   Shift Total(mL/kg)       Weight (kg) 119.9 119.9 119.9 119.9                Vent Mode: A/C  Oxygen Concentration (%):  [40] 40  Resp Rate Total:  [20 br/min-25 br/min] 22 br/min  Vt Set:  [460 mL] 460 mL  PEEP/CPAP:  [5 cmH20] 5 cmH20  Pressure Support:  [10 cmH20] 10 cmH20  Mean Airway Pressure:  [9 zlX50-87 cmH20] 11 cmH20             Closed/Suction Drain Right Scalp (Active)   Site Description Unable to view 12/23/23 0800   Dressing Type Gauze 12/23/23 0800   Dressing Status Clean;Dry;Intact 12/23/23 0800   Dressing Intervention Integrity maintained 12/23/23 0800   Drainage Serosanguineous 12/23/23 0800   Status To bulb suction 12/23/23 0800   Output (mL) 15 mL 12/22/23 1841            NG/OG Tube 12/20/23 0930 16 Fr. Center mouth (Active)   Placement Check placement verified by aspirate characteristics 12/23/23 0800   Distal Tube Length (cm) 75 12/23/23 0800   Tolerance no signs/symptoms of discomfort 12/23/23 0800   Securement secured to commercial device 12/23/23 0800   Clamp Status/Tolerance unclamped 12/23/23 0800   Suction Setting/Drainage Method suction at;low;intermittent setting 12/23/23 0800   Insertion Site Appearance no  "redness, warmth, tenderness, skin breakdown, drainage 12/23/23 0800   Drainage Green;Bile;Brown 12/23/23 0800   Flush/Irrigation flushed w/;water;no resistance met 12/23/23 0800   Tube Output(mL)(Include Discarded Residual) 300 mL 12/22/23 0653            Urethral Catheter 12/20/23 1007 (Active)   $ Fernandez Insertion Bedside Insertion Performed 12/20/23 0930   Site Assessment Clean;Intact;Dry 12/23/23 0800   Collection Container Standard drainage bag 12/23/23 0800   Securement Method secured to top of thigh w/ adhesive device 12/23/23 0800   Catheter Care Performed yes 12/23/23 0800   Reason for Continuing Urinary Catheterization Critically ill in ICU and requiring hourly monitoring of intake/output 12/23/23 0800   CAUTI Prevention Bundle Securement Device in place with 1" slack;Intact seal between catheter & drainage tubing;Drainage bag/urimeter off the floor;No dependent loops or kinks;Sheeting clip in use;Drainage bag/urimeter below bladder;Drainage bag/urimeter not overfilled (<2/3 full) 12/23/23 0800   Output (mL) 125 mL 12/23/23 0800       Neurosurgery Physical Exam    Intubated, sedated  GCS 7 T   E1, [id number], M5.    PERRLA sluggish bilaterally   Right arm spontaneously reaches up.     Minimal withdrawal to bilateral lower extremities to pain   Intact cough corneal gag  Right craniectomy site tense but depressible.  Staples in place.      Significant Labs:  Recent Labs   Lab 01/02/24  0104   *   K 3.7   CO2 26   BUN 19.6   CREATININE 0.78   CALCIUM 7.9*   MG 2.20       Recent Labs   Lab 01/02/24  0104   WBC 8.75   HGB 10.4*   HCT 33.4*          Recent Labs   Lab 01/02/24  0108   INR 1.1     Microbiology Results (last 7 days)       Procedure Component Value Units Date/Time    Blood Culture [[id number]]  (Normal) Collected: 12/23/23 1831    Order Status: Completed Specimen: Blood from Central Line Updated: 12/28/23 2000     CULTURE, BLOOD ([facility]) No Growth at 5 days    Blood Culture [[id number]]  " (Normal) Collected: 12/23/23 1831    Order Status: Completed Specimen: Blood from IV Site Updated: 12/28/23 2000     CULTURE, BLOOD ([facility]) No Growth at 5 days            Active Diagnoses:    Diagnosis Date Noted POA    PRINCIPAL PROBLEM:  Stroke [I63.9] 12/19/2023 Yes      Problems Resolved During this Admission:       BLAIR eBy-BC  Neurosurgery  [facility] - [facility] ICU

## 2024-01-02 NOTE — PROGRESS NOTES
"Gastroenterology Progress Note    Subjective/Interval History:  68-year-old AA male unknown to our group (primary GI is Dr. 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  Hospital course complicated by superficial thrombus and left cephalic vein, AFib RVR, acute hypoxemic respiratory failure requiring intubation.  Continues to have altered neurological status... Dr. Mark Escalona supervised his initial consult... Now post trach, PEG tube proposed after the holiday. Tolerating tubefeeds, Lovenox held, Ancef in place, family agreeable to PEG placement.     ROS:  Review of Systems   Unable to perform ROS: Critical illness       Vital Signs:  BP (!) 144/97   Pulse 70   Temp 98.7 °F (37.1 °C) (Oral)   Resp (!) 21   Ht 5' 10.98" (1.803 m)   Wt 119.9 kg (264 lb 5.3 oz)   SpO2 99%   BMI 36.88 kg/m²   Body mass index is 36.88 kg/m².    Physical Exam:  Physical Exam  Constitutional:       Appearance: He is obese.   HENT:      Mouth/Throat:      Mouth: Mucous membranes are moist.   Cardiovascular:      Rate and Rhythm: Normal rate and regular rhythm.      Heart sounds: Normal heart sounds.   Pulmonary:      Breath sounds: Normal breath sounds.   Abdominal:      Palpations: Abdomen is soft.      Tenderness: There is no abdominal tenderness.   Skin:     General: Skin is warm and dry.   Neurological:      Mental Status: Mental status is at baseline.         Labs:  Recent Results (from the past 48 hour(s))   POCT glucose    Collection Time: 01/01/24  1:03 AM   Result Value Ref Range    POCT Glucose 115 (H) 70 - 110 mg/dL   POCT glucose    Collection Time: 01/01/24  6:17 AM   Result Value Ref Range    POCT Glucose " 116 (H) 70 - 110 mg/dL   POCT glucose    Collection Time: 01/02/24 12:14 AM   Result Value Ref Range    POCT Glucose 129 (H) 70 - 110 mg/dL   Comprehensive Metabolic Panel    Collection Time: 01/02/24  1:04 AM   Result Value Ref Range    Sodium Level 149 (H) 136 - 145 mmol/L    Potassium Level 3.7 3.5 - 5.1 mmol/L    Chloride 114 (H) 98 - 107 mmol/L    Carbon Dioxide 26 23 - 31 mmol/L    Glucose Level 137 (H) 82 - 115 mg/dL    Blood Urea Nitrogen 19.6 8.4 - 25.7 mg/dL    Creatinine 0.78 0.73 - 1.18 mg/dL    Calcium Level Total 7.9 (L) 8.8 - 10.0 mg/dL    Protein Total 5.3 (L) 5.8 - 7.6 gm/dL    Albumin Level 2.1 (L) 3.4 - 4.8 g/dL    Globulin 3.2 2.4 - 3.5 gm/dL    Albumin/Globulin Ratio 0.7 (L) 1.1 - 2.0 ratio    Bilirubin Total 0.8 <=1.5 mg/dL    Alkaline Phosphatase 50 40 - 150 unit/L    Alanine Aminotransferase 39 0 - 55 unit/L    Aspartate Aminotransferase 35 (H) 5 - 34 unit/L    eGFR >60 mls/min/1.73/m2   Magnesium    Collection Time: 01/02/24  1:04 AM   Result Value Ref Range    Magnesium Level 2.20 1.60 - 2.60 mg/dL   Phosphorus    Collection Time: 01/02/24  1:04 AM   Result Value Ref Range    Phosphorus Level 2.6 2.3 - 4.7 mg/dL   CBC with Differential    Collection Time: 01/02/24  1:04 AM   Result Value Ref Range    WBC 8.75 4.50 - 11.50 x10(3)/mcL    RBC 3.62 (L) 4.70 - 6.10 x10(6)/mcL    Hgb 10.4 (L) 14.0 - 18.0 g/dL    Hct 33.4 (L) 42.0 - 52.0 %    MCV 92.3 80.0 - 94.0 fL    MCH 28.7 27.0 - 31.0 pg    MCHC 31.1 (L) 33.0 - 36.0 g/dL    RDW 15.2 11.5 - 17.0 %    Platelet 285 130 - 400 x10(3)/mcL    MPV 11.3 (H) 7.4 - 10.4 fL    Neut % 75.2 %    Lymph % 14.9 %    Mono % 5.3 %    Eos % 2.4 %    Basophil % 0.3 %    Lymph # 1.30 0.6 - 4.6 x10(3)/mcL    Neut # 6.58 2.1 - 9.2 x10(3)/mcL    Mono # 0.46 0.1 - 1.3 x10(3)/mcL    Eos # 0.21 0 - 0.9 x10(3)/mcL    Baso # 0.03 <=0.2 x10(3)/mcL    IG# 0.17 (H) 0 - 0.04 x10(3)/mcL    IG% 1.9 %    NRBC% 0.5 %   Protime-INR    Collection Time: 01/02/24  1:08 AM   Result  Value Ref Range    PT 14.4 12.5 - 14.5 seconds    INR 1.1 <=1.3       Imaging:  CT Head Without Contrast    Result Date: 1/2/2024  EXAMINATION: CT HEAD WITHOUT CONTRAST CLINICAL HISTORY: Stroke, follow up;repeat CTh 14 days after thrombectomy to determine if anticoagulation can be started; TECHNIQUE: Sequential axial images were performed of the brain without contrast. Dose product length of 1018 mGycm. Automated exposure control was utilized to minimize radiation dose. COMPARISON: December 22, 2023. FINDINGS: There is right wide craniotomy.  Interval improved right cerebral edema related to infarct and associated multiple hemorrhagic foci.  There is resolved mass effect upon the right lateral ventricle and there is no right to left midline shift.  No new intra-axial or extra-axial hemorrhage.  No acute findings of the left cerebrum.  There is chronic microvascular ischemia and atrophy. There is no acute extra axial fluid collection.  There is prominent mucosal thickening involving the sphenoid sinus, ethmoidal air cells and to a lesser degree the ethmoidal air cells.  Bilateral similar loss of pneumatization of the mastoid air cells with opacification.     Improved right cerebral edema, hemorrhagic foci, mass effect and no residual midline shift. Electronically signed by: Sami Taylor Date:    01/02/2024 Time:    09:34    US Abdomen Complete    Result Date: 12/31/2023  EXAMINATION: US ABDOMEN COMPLETE CLINICAL HISTORY: Abdominal distention, decrease urine output, transaminitis; TECHNIQUE: Limited abdominal Ultrasound Images obtained in grayscale and color. COMPARISON: None FINDINGS: The LIVER is normal in size and contour at 16.5 cm (sagittal right lobe). Hepatic parenchyma has increased echogenicity as would be seen with fatty infiltration. The BILIARY SYSTEM is normal in caliber; the common hepatic duct measures 3 mm.There are no  stones seen in the GALL BLADDER. There is no evidence of acute cholecystitis. The  PANCREATIC head and body are predominantly obscured by bowel gas. The RIGHT KIDNEY is normal in size at 13 cm and echogenicity/texture. No stones or hydronephrosis seen. No solid masses are noted. The left kidney is normal in size at 11 cm.  There is no increased echogenicity.  No stones or hydronephrosis. The spleen is unremarkable 111.3 cm. The AORTA and IVC are predominantly obscured by bowel gas.     No acute sonographic abnormality.  Findings may be seen with hepatic steatosis.  Small right pleural effusion noted. Electronically signed by: Jg Plunkett Date:    12/31/2023 Time:    11:24    X-Ray Abdomen AP 1 View    Result Date: 12/31/2023  EXAMINATION: XR ABDOMEN AP 1 VIEW CLINICAL HISTORY: abd distention, lack of BM for 4 days, emesis; TECHNIQUE: Single-view of the abdomen COMPARISON: 12/29/2023 FINDINGS: No acute osseous abnormality.  Nonobstructive bowel gas pattern.  NG tube projects over the left upper quadrant.     Nonobstructive bowel gas pattern. Electronically signed by: Jg Plunkett Date:    12/31/2023 Time:    09:57    X-Ray Chest 1 View    Result Date: 12/31/2023  EXAMINATION: XR CHEST 1 VIEW CLINICAL HISTORY: resp failure; TECHNIQUE: Single view of the chest COMPARISON: 12/29/2023 FINDINGS: Tracheostomy is midline.  Cardiomegaly remains.  No pleural effusion or pneumothorax.     No adverse interval change appreciated.  Tracheostomy remains. Electronically signed by: Jg Plunkett Date:    12/31/2023 Time:    09:55    X-Ray Chest 1 View    Result Date: 12/29/2023  EXAMINATION: XR CHEST 1 VIEW CLINICAL HISTORY: Status post trach; TECHNIQUE: One view COMPARISON: Nine December 27, 2023. FINDINGS: Cardiopericardial silhouette enlarged appearance similar.  Cardiac device electrodes are in similar location.  Tracheostomy replaces the endotracheal tube.  Nasogastric tube traverses into the stomach similar location.  No acute dense focal or segmental consolidation, congestive process, pleural  effusions or pneumothorax.     NO ACUTE CARDIOPULMONARY PROCESS IDENTIFIED. Electronically signed by: Sami Taylor Date:    12/29/2023 Time:    22:18    XR Gastric tube check, non-radiologist performed    Result Date: 12/29/2023  EXAMINATION: XR GASTRIC TUBE CHECK, NON-RADIOLOGIST PERFORMED CLINICAL HISTORY: verification; COMPARISON: Earlier today FINDINGS: Frontal image of the upper abdomen.  Enteric tube extends well into the stomach.     As above. Electronically signed by: Tani Calderon Date:    12/29/2023 Time:    16:04    XR Gastric tube check, non-radiologist performed    Result Date: 12/29/2023  EXAMINATION: XR GASTRIC TUBE CHECK, NON-RADIOLOGIST PERFORMED CLINICAL HISTORY: verification; COMPARISON: None FINDINGS: A nasogastric tube is seen with the tip in the antrum of the stomach perhaps even past the pylorus     As above Electronically signed by: Vik Keen Date:    12/29/2023 Time:    15:10    X-Ray Chest 1 View    Result Date: 12/27/2023  EXAMINATION: XR CHEST 1 VIEW CPT 65621 CLINICAL HISTORY: tube replacement; COMPARISON: December 27, 2023 FINDINGS: Examination reveals cardiomediastinal silhouette and pleuroparenchymal changes to be essentially unchanged as compared with the previous exam. Endotracheal tube is seen with the tip at the level of the clavicular heads     No significant change Electronically signed by: Vik Keen Date:    12/27/2023 Time:    12:56    X-Ray Chest 1 View    Result Date: 12/27/2023  EXAMINATION: XR CHEST 1 VIEW CPT 55819 CLINICAL HISTORY: tube placement; COMPARISON: December 24th 2023 FINDINGS: Cardiomediastinal silhouette and pleuroparenchymal changes are essentially unchanged as compared with the previous exam. Endotracheal tube is seen with the tip above the jackie nasogastric tube is seen with the tip below the diaphragm     No significant change. Support catheters as above Electronically signed by: Vik Keen Date:    12/27/2023 Time:    12:53    CV  Ultrasound doppler venous arm left    Result Date: 12/26/2023  There was no evidence of deep vein thrombosis in the left upper extremity. A superficial thrombosis was identified in the left cephalic vein.     CV Ultrasound doppler arterial arm left    Result Date: 12/26/2023  Patent left upper extremity arterial system with no evidence of focal stenosis or flow reduction. Triphasic waveforms identified throughout.     X-Ray Chest 1 View    Result Date: 12/24/2023  EXAMINATION XR CHEST 1 VIEW CLINICAL HISTORY respiratory failure; TECHNIQUE A total of 2 frontal image(s) of the chest. COMPARISON 20 December 2023 FINDINGS Lines/tubes/devices: Grossly unchanged positioning when allowing for differences in technique and patient rotation. The cardiac silhouette and central vascular structures are unchanged.  The trachea is midline. No new or worsening consolidation is identified. There is no enlarging pleural effusion or convincing pneumothorax. Regional osseous structures and extrathoracic soft tissues are similar. IMPRESSION No significant interval change. Electronically signed by: Isaac Carcamo Date:    12/24/2023 Time:    13:09    CT Head Without Contrast    Result Date: 12/22/2023  EXAMINATION: CT HEAD WITHOUT CONTRAST CLINICAL HISTORY: Stroke, follow up; TECHNIQUE: Low dose axial CT images obtained throughout the head without intravenous contrast.  Axial, sagittal and coronal reconstructions were performed and interpreted. DLP: 1181 mGycm All CT scans at this location are performed using dose optimization techniques as appropriate to a performed exam including the following automated exposure control, adjustment of the mA and/or kV according to patient size and/or use of iterative reconstruction technique COMPARISON: CT head 12/21/2023 FINDINGS: BRAIN: Massive right cerebral hemisphere infarct is again seen in similar configuration to the previous exam with loss of gray-white differentiation.  There is cytotoxic edema  and gyral swelling.  Areas of hemorrhagic conversion throughout the area of infarction are very similar to the prior exam.  No evidence of enlarging bleed.  There are postsurgical changes of decompressive craniectomy with expected, unchanged bulging of the cerebrum at the craniectomy defect.  There is approximately 5 mm right to left midline shift at the level of the superior margin of the thalami, similar to previous.  The posterior fossa and midline structures are unremarkable. VENTRICLES: There is mass effect on the right lateral ventricle related to cerebral edema.  Unchanged appearance of the left lateral ventricle with mild stable dilatation. EXTRA-AXIAL: There is a right subdural drain in place. BONES: Postop right craniectomy. SINUSES AND MASTOIDS: Visualized paranasal sinuses and mastoid air cells are clear.     No significant interval change compared to previous exam.  Large right hemispheric infarct with hemorrhagic transformation similar to prior.  Postop decompressive right craniectomy. Electronically signed by: Laureen Christina Date:    12/22/2023 Time:    09:16    CT Head Without Contrast    Result Date: 12/21/2023  EXAMINATION: CT HEAD WITHOUT CONTRAST CLINICAL HISTORY: Postop; TECHNIQUE: Axial scans were obtained from skull base to the vertex. Coronal and sagittal reconstructions obtained from the axial data. Automatic exposure control was utilized to limit radiation dose. Contrast: None Radiation Dose: Total DLP: 1012 mGy*cm COMPARISON: CT head dated 12/20/2023 FINDINGS: There are postoperative changes following right-sided craniectomy with subdural drain in place.  There are evolving ischemic changes in the right cerebral hemisphere with interval development of numerous parenchymal hemorrhages. There is mass effect with sulcal effacement, partial effacement of the right lateral ventricle and 6 mm of leftward midline shift, overall significantly improved.  The basal cisterns are partially effaced.   There is mild dilatation of the left lateral ventricle.  Skull base is intact.  There is mild scattered paranasal sinus mucosal thickening.     1. Postoperative changes following right-sided craniectomy. 2. Evolving ischemic changes in the right cerebral hemisphere with interval development of hemorrhagic transformation. 3. Interval improvement of mass effect. No significant change from the [facility] interpretation. Electronically signed by: Vonnie Haney Date:    12/21/2023 Time:    06:47    X-Ray Chest 1 View    Result Date: 12/20/2023  EXAMINATION: XR CHEST 1 VIEW CLINICAL HISTORY: other; TECHNIQUE: Single frontal view of the chest was performed. COMPARISON: 12/20/2023 FINDINGS: Endotracheal tube, enteric tube, right IJ central venous catheter in right chest wall AICD are unchanged position. The heart size enlarged the pulmonary vasculature is congested. Left retrocardiac opacity identified with hypoventilatory changes lungs bibasilar atelectatic changes.  No effusion on the right.     Lines and tubes as above without significant interval change.  Persistent interstitial edema with likely left-sided effusion. Electronically signed by: Rico Ornelas MD Date:    12/20/2023 Time:    21:24    X-Ray Chest 1 View for Line/Tube Placement    Result Date: 12/20/2023  EXAMINATION: XR CHEST 1 VIEW FOR LINE/TUBE PLACEMENT CLINICAL HISTORY: s/p intubation; TECHNIQUE: Single frontal portable view of the chest was performed. COMPARISON: None FINDINGS: Examination reveals mediastinal silhouette to be within normal limits cardiac silhouette is mildly enlarged some increase interstitial markings and slightly more confluent airspace opacities identified specially in the left lung these might be related to poor inspiratory effort, however, superimposed infiltrate can not be completely excluded. There is an endotracheal tube with tip above the jackie nasogastric tube is seen with the tip below the diaphragm     Poor inspiratory  effort accentuates the pulmonary vascular markings. Mild cardiomegaly. Slightly more confluent opacities in the left perihilar region and left base although it might be related to the poor inspiratory effort infiltrate cannot be completely excluded. Interval insertion of endotracheal tube and nasogastric tube Electronically signed by: Vik Keen Date:    12/20/2023 Time:    10:29    CT Head Without Contrast    Result Date: 12/20/2023  EXAMINATION: CT HEAD WITHOUT CONTRAST CLINICAL HISTORY: Neuro deficit, acute, stroke suspected;stroke alert;; TECHNIQUE: Low dose axial images were obtained through the head.  Coronal and sagittal reformations were also performed. Contrast was not administered. Automatic exposure control was utilized to reduce the patient's radiation dose. DLP= 965 COMPARISON: 12/09/2023 FINDINGS: Increasing hypodensity and edema throughout the distribution of the right MCA.  There is increasing mass effect with 1 point 6 cm of right to left midline shift.  There is complete compression of the right lateral ventricle.     Worsening exam with development of 1.6 cm of right to left midline shift. Findings reported to Dr. Pearce prior to interpretation. Electronically signed by: Jg Plunkett Date:    12/20/2023 Time:    09:11    CV Ultrasound Bilateral Doppler Carotid    Result Date: 12/19/2023  The right internal carotid artery demonstrated less than 50% stenosis. The left internal carotid artery demonstrated less than 50% stenosis. The bilateral vertebral arteries were patent with antegrade flow. Bilateral internal carotid arteries demonstrated decreased velocities starting from the common carotid arteries.     Echo Saline Bubble? Yes    Result Date: 12/19/2023    Left Ventricle: There is moderate concentric hypertrophy. Normal wall motion. There is low normal systolic function with a visually estimated ejection fraction of 50 - 55%. Unable to assess diastolic function due to atrial  fibrillation. Elevated left ventricular filling pressure.   Right Ventricle: Normal right ventricular cavity size. Systolic function is mildly reduced.   Left Atrium: Left atrium is mildly dilated. Agitated saline study of the atrial septum is negative, suggesting absence of intracardiac shunt at the atrial level.   Right Atrium: Right atrium is dilated.   Mitral Valve: There is mild to moderate regurgitation with an anteromedial eccentrically directed jet.   Negative bubble study     IR Thrombectomy Intracranial Inc all Imaging    Result Date: 12/19/2023  EXAMINATION: IR THROMBECTOMY INTRACRANIAL INC ALL IMAGING CLINICAL HISTORY: Cerebral infarction, unspecifiedstroke; FINDINGS: Fluoroscopic assistance provided during vascular procedure.  Images were independently interpreted by the attending physician performing the procedure. Fluoro time 9.7 minutes. Reference Air Kerma: 917 mGy.     Fluoroscopic assistance provided as above. Electronically signed by: Tani Calderon Date:    12/19/2023 Time:    15:11    CTA STROKE MULTI-PHASE    Result Date: 12/19/2023  EXAMINATION: CTA STROKE MULTI-PHASE CLINICAL HISTORY: Neuro deficit, acute, stroke suspected; TECHNIQUE: CT angiogram was performed from the level of the jackie to the vertex prior to and following the IV administration of intravenous contrast.  Axial, sagittal and coronal reconstructions and maximum intensity projection reconstructions were performed. Arterial stenosis percentages are based on NASCET measurement criteria. Automated tube current modulation, weight-based exposure dosing, and/or iterative reconstruction technique utilized to reach lowest reasonably achievable exposure rate. DLP: 2045 mGycm COMPARISON: CT head 12/19/2023 at 10:57 hours FINDINGS: CTA NECK: AORTIC ARCH AND GREAT VESSELS: 2 vessel left aortic arch. RIGHT ICA: There is atherosclerotic plaque at the carotid bulb and proximal internal carotid artery with less than 50% stenosis.  There is  irregular contour and inhomogeneous enhancement of the cervical carotid artery at the skull base and at the petrous carotid artery (for example series 1, image 289). LEFT ICA: Mild atherosclerotic plaque at the carotid bulb without hemodynamically significant stenosis. VERTEBRAL ARTERIES: Diminutive vertebral arteries without stenosis. CTA HEAD: ANTERIOR CIRCULATION: On the arterial phase imaging there is asymmetric hypo perfusion and irregular appearance of the cervical carotid artery on the right.  There is poor enhancement at the HÉCTOR and M1 segment.  Contrast fades distally towards the M2 segment.  There is gross asymmetric hypoenhancement of the vasculature at the sylvian fissure when comparing right to left on the arterial phase.  On a slightly delayed phase obtained approximately 30-40 seconds later there is enhancement at the right anterior circulation which is now more symmetric compared to the left suggesting potential delayed in flow phenomenon or perfusion via the [id number].  The left anterior circulation enhances normally without significant stenosis. POSTERIOR CIRCULATION: No hemodynamically significant stenosis, aneurysmal dilatation, or dissection. NON-VASCULAR STRUCTURES (LIMITED EVALUATION): There is very subtle loss of gray-white differentiation in the region of the insular cortex and right frontal lobe and slight blurring of delineation of the basal ganglia on the right.  No appreciable hemorrhage. Poor dentition.  Dental caries and periapical lucency.     Irregular appearance of the right internal carotid artery near the skull base and petrous portion.  This is of uncertain etiology.  This could be related to soft atheromatous plaque, ill-defined dissection flap or artifact. Poor enhancement of the right anterior circulation including the MCA and HÉCTOR on arterial phase.  There is delayed enhancement of the right HÉCTOR and MCA seen on delayed phase postcontrast imaging suggesting  upstream/inflow abnormality. Very subtle asymmetric loss of gray-white differentiation in the right cerebral hemisphere most pronounced at the frontal lobe and basal ganglia.  No appreciable hemorrhage. Findings discussed with clinician caring for patient Dr. Randle 12/19/2023 12:39. Electronically signed by: Laureen Christina Date:    12/19/2023 Time:    12:59    CT HEAD FOR STROKE    Result Date: 12/19/2023  EXAMINATION: CT HEAD FOR STROKE CLINICAL HISTORY: Neuro deficit, acute, stroke suspected; TECHNIQUE: CT imaging of the head performed from the skull base to the vertex without intravenous contrast.  mGycm. Automatic exposure control, adjustment of mA/kV or iterative reconstruction technique was used to reduce radiation. COMPARISON: 23 May 2023 FINDINGS: There is no acute cortical infarct, hemorrhage or mass lesion.  No new parenchymal attenuation abnormality.  Ventricular size is stable.  There are vascular calcifications. Visualized paranasal sinuses and mastoid air cells are clear.     No acute intracranial findings or significant interval change compared to May 2023. Electronically signed by: Tani Calderon Date:    12/19/2023 Time:    11:33         Assessment/Plan:  Complex 68-year-old post CVA with profound dysphagia warranting access for long-term nutrition.  Obesity may be a thao, but a PEG attempt seems a prudent next step.  The family is agreeable, with an understanding of the attendant risks and complications as well as the alternatives.  Further recommendations will follow PEG attempt.  Lovenox has been held this morning, Ancef provided as antibiotic prophylaxis       Tani Day PA-C

## 2024-01-02 NOTE — PROGRESS NOTES
Inpatient Nutrition Assessment    Admit Date: 12/19/2023   Total duration of encounter: 14 days   Patient Age: 68 y.o.    Nutrition Recommendation/Prescription     Restart TF when appropriate.  Tube feeding recommendation:    Peptamen Intense VHP goal rate 75 ml/hr to provide  1500 kcal/d (97% est needs)  139 g protein/d (89% est needs)  1260 ml free water/d   (calculations based on estimated 20 hr/d run time)     If no IV fluids running, can give 100ml q 2hr water flushes. Total water provided: 2260ml (82% est needs.)     Communication of Recommendations: reviewed with nurse    Nutrition Assessment     Malnutrition Assessment/Nutrition-Focused Physical Exam    Malnutrition Context: acute illness or injury (12/21/23 1406)  Malnutrition Level:  (does not meet criteria) (12/21/23 1406)  Energy Intake (Malnutrition):  (unable to eval) (12/21/23 1406)  Weight Loss (Malnutrition):  (unable to eval) (12/21/23 1406)  Subcutaneous Fat (Malnutrition):  (does not meet criteria) (12/21/23 1406)           Muscle Mass (Malnutrition):  (does not meet criteria) (12/21/23 1406)                          Fluid Accumulation (Malnutrition):  (does not meet criteria) (12/21/23 1406)        A minimum of two characteristics is recommended for diagnosis of either severe or non-severe malnutrition.    Chart Review    Reason Seen: continuous nutrition monitoring and follow-up    Malnutrition Screening Tool Results   Have you recently lost weight without trying?: No  Have you been eating poorly because of a decreased appetite?: No   MST Score: 0   Diagnosis:  CVA s/p right ICA and MCA M3  branch thrombectomy s/p craniectomy with hemorrhagic conversion  Acute hypoxic respiratory failure  Atrial fibrillation    Relevant Medical History: Afib, HTN, CAD, CAD (STEMI 2003), HFpEF     Scheduled Medications:  aspirin, 81 mg, Daily  carvediloL, 3.125 mg, BID  diltiaZEM, 240 mg, Daily  docusate, 100 mg, BID  enoxparin, 1 mg/kg, Q12H (treatment,  non-standard time)  famotidine (PF), 20 mg, Daily  levETIRAcetam (Keppra) IV (PEDS and ADULTS), 1,000 mg, Q12H  losartan, 50 mg, Daily  mannitol 20%, 75 g, Once  polyethylene glycol, 17 g, TID  sennosides 8.8 mg/5 ml, 5 mL, BID    Continuous Infusions:  dexmedeTOMIDine (Precedex) infusion (titrating), Last Rate: 1 mcg/kg/hr (01/02/24 1258)  fentanyl, Last Rate: Stopped (12/31/23 0634)  NORepinephrine bitartrate-D5W, Last Rate: Stopped (12/20/23 1929)    PRN Medications: 0.9%  NaCl infusion (for blood administration), acetaminophen, dextrose 10%, dextrose 10%, fentaNYL, glucagon (human recombinant), hydrALAZINE, insulin aspart U-100, ipratropium, labetalol, levalbuterol, metoprolol, ondansetron, polyethylene glycol, sodium chloride 0.9%    Calorie Containing IV Medications: no significant kcals from medications at this time    Recent Labs   Lab 12/29/23  0328 12/30/23  0229 12/31/23  0154 01/02/24  0104   * 146* 147* 149*   K 3.4* 4.3 3.6 3.7   CALCIUM 8.0* 7.8* 7.9* 7.9*   PHOS 2.7 3.0 2.9 2.6   MG 2.10 2.20 2.10 2.20   CHLORIDE 114* 114* 114* 114*   CO2 25 23 23 26   BUN 17.2 23.5 22.1 19.6   CREATININE 0.91 0.84 0.87 0.78   EGFRNORACEVR >60 >60 >60 >60   GLUCOSE 107 130* 121* 137*   BILITOT 0.8 0.8 0.8 0.8   ALKPHOS 52 56 55 50   ALT 57* 70* 62* 39   AST 49* 66* 51* 35*   ALBUMIN 1.9* 2.1* 2.2* 2.1*   WBC 11.29 12.44* 11.16 8.75   HGB 10.2* 10.7* 10.8* 10.4*   HCT 31.7* 33.7* 33.5* 33.4*        Nutrition Orders:  Diet NPO      Appetite/Oral Intake: NPO/not applicable  Factors Affecting Nutritional Intake: on mechanical ventilation and tracheostomy  Food/Yarsanism/Cultural Preferences: unable to obtain  Food Allergies: none reported  Last Bowel Movement: 01/02/24  Wound(s):  incision noted    Comments    12/21/23: Discussed with RN. Will provide tube feeding recommendations for when appropriate to start tube feeding. Receiving kcal from meds.      12/22/23: Patient remains intubated, receiving kcal from meds.  "Cleviprex d/c'ed this morning, Diprivan continues. Patient currently with OG tube to LIS.     12/23/23: Pt with significant output via NG tube. Propofol infusion increased and Precedex started. Will leave TPN recommendations if Tube feeds are unable to be initiated by tomorrow.     12/26/23: TF continues, tolerated per RN. Noted no longer receiving kcal from meds. Will update goal rate to continue to meet est needs.     12/29/23: TF on hold for trach placement today. Pt recently returned, NG to be placed per RN. Once pt weaned off vent, will update TF and est needs.     1/2/24: TF on hold for PEG placement today. Plans to restart TF. Will update FWF to more closely meet est fluid needs.     Anthropometrics    Height: 5' 10.98" (180.3 cm),    Last Weight: 119.9 kg (264 lb 5.3 oz) (12/29/23 0541), Weight Method: Bed Scale  BMI (Calculated): 36.9  BMI Classification: obese grade I (BMI 30-34.9)        Ideal Body Weight (IBW), Male: 171.88 lb     % Ideal Body Weight, Male (lb): 140.99 %                          Usual Weight Provided By: unable to obtain usual weight    Wt Readings from Last 5 Encounters:   12/29/23 119.9 kg (264 lb 5.3 oz)   12/11/23 112.7 kg (248 lb 7.3 oz)   12/05/23 112.3 kg (247 lb 9.6 oz)   11/07/23 109.5 kg (241 lb 6.5 oz)   10/30/23 113.9 kg (251 lb 1.7 oz)     Weight Change(s) Since Admission:   Wt Readings from Last 1 Encounters:   12/29/23 0541 119.9 kg (264 lb 5.3 oz)   12/28/23 0600 116.9 kg (257 lb 11.5 oz)   12/27/23 0600 116.9 kg (257 lb 11.5 oz)   12/26/23 0600 116.1 kg (255 lb 15.3 oz)   12/24/23 0544 111.1 kg (244 lb 14.9 oz)   12/19/23 1645 110 kg (242 lb 8.1 oz)   12/19/23 1053 110 kg (242 lb 8.1 oz)   Admit Weight: 110 kg (242 lb 8.1 oz) (12/19/23 1053), Weight Method: Bed Scale    Estimated Needs    Weight Used For Calorie Calculations: 110 kg (242 lb 8.1 oz)  Energy Calorie Requirements (kcal): 1210-1540kcal (11-14kcal/kg)  Energy Need Method: Kcal/kg  Weight Used For Protein " Calculations: 78.2 kg (172 lb 6.4 oz) (IBW)  Protein Requirements: 157gm (2g/kg IBW)  Fluid Requirements (mL): 2750mL (25mL/kg CBW) or per MD    Enteral Nutrition     Formula: Peptamen Intense VHP  Rate/Volume: 75ml/hr  Water Flushes: 50ml q4hr  Additives/Modulars: none at this time  Route: nasogastric tube  Method: continuous  Total Nutrition Provided by Tube Feeding, Additives, and Flushes:  Calories Provided  1500 kcal/d, 97% needs   Protein Provided  139 g/d, 89% needs   Fluid Provided  1260 ml/d, N/A% needs   Continuous feeding calculations based on estimated 20 hr/d run time unless otherwise stated.    Parenteral Nutrition     Patient not receiving parenteral nutrition support at this time.    Evaluation of Received Nutrient Intake    Calories: not meeting estimated needs  Protein: not meeting estimated needs    Patient Education     Not applicable.    Nutrition Diagnosis     PES: Inadequate oral intake related to acute illness as evidenced by intubation/trach since 12/19/23. (active)     Nutrition Interventions     Intervention(s): collaboration with other providers    Goal: Meet greater than 80% of nutritional needs by follow-up. (goal progressing)  Goal: Tolerate enteral feeding at goal rate by follow-up. (goal progressing)    Nutrition Goals & Monitoring     Dietitian will monitor: energy intake    Nutrition Risk/Follow-Up: high (follow-up in 1-4 days)   Please consult if re-assessment needed sooner.

## 2024-01-02 NOTE — OP NOTE
PEG Procedure    Performed by: Tani Day    Date of procedure: 01/02/2024     ASA:3    Medications: per anesthesia    Indication: dysphagia    The patient was brought back to the endoscopy suite. The risks, benefits, and alternatives of the procedure were described in detail.The patient and family were given the opportunity to ask questions and then signed informed consent. After consent was obtained the patient was given appropriate antibiotics, positioned in the supine position, continuous monitoring was initiated, and supplemental oxygen was provided via nasal cannula. Bite block was placed. Adequate sedation was achieved with the above mentioned medications as documented in chart and then titrated during the entire procedure. Under direct visualization the gastroscope was introduced through the oropharynx, advanced into the esophagus, and then stomach.  Gastric contents were suctioned. Scope was then advanced into the small bowel and slowly withdrawn back into the stomach, and the mucosa was carefully examined. The stomach was appropriately insufflated. In the gastric body we had good transillumination and good 1:1 palpation. PEG site spot was marked. Area was then prepped and draped in standard fashion. We used 1% lidocaine to numb the area. We then used the same needle to advance into the gastric cavity. We then made a small skin incision with the scalpel and advanced the trocar into the gastric cavity, viewed endoscopically with good result. We then passed the wire through the trocar and grasped this endoscopically with a snare. The wire was then pulled out and attached to or inserted in the PEG tube. The PEG tube was then pulled into position using standard PEG technique. The outside bumper was at 4 cm. The scope was reintroduced to assure proper internal bumper placement and to examine the esophagus. The procedure was completed. The pt tolerated the procedure well without immediate complications, and  was able to be transferred to the recovery area in stable condition    Estimated blood loss: minimal    Complications: none    Findings:   1. Relatively unremarkable endoscopy to the duodenal in its 2nd portion.  Indwelling Dobbhoff removed  2. Successful pull type PEG 24 inserted left upper quadrant just off the midline with good transillumination and finger indentation  3. Repeat endoscopy post PEG placement assured proper internal bumper placement.  An external bumper was applied      Impression and Recommendations:   Relatively unremarkable endoscopy, successful PEG 24 placement.  Tube feeds will be initiated, serial exams will follow.  The patient did receive Ancef prior to the procedure as prophylaxis.  Lovenox was held but will be re-initiated    Tani Day MD

## 2024-01-03 LAB
ALBUMIN SERPL-MCNC: 2.2 G/DL (ref 3.4–4.8)
ALBUMIN/GLOB SERPL: 0.6 RATIO (ref 1.1–2)
ALP SERPL-CCNC: 58 UNIT/L (ref 40–150)
ALT SERPL-CCNC: 46 UNIT/L (ref 0–55)
AST SERPL-CCNC: 34 UNIT/L (ref 5–34)
BASOPHILS # BLD AUTO: 0.04 X10(3)/MCL
BASOPHILS NFR BLD AUTO: 0.5 %
BILIRUB SERPL-MCNC: 0.7 MG/DL
BUN SERPL-MCNC: 16.5 MG/DL (ref 8.4–25.7)
CALCIUM SERPL-MCNC: 7.9 MG/DL (ref 8.8–10)
CHLORIDE SERPL-SCNC: 111 MMOL/L (ref 98–107)
CO2 SERPL-SCNC: 31 MMOL/L (ref 23–31)
CREAT SERPL-MCNC: 0.81 MG/DL (ref 0.73–1.18)
EOSINOPHIL # BLD AUTO: 0.38 X10(3)/MCL (ref 0–0.9)
EOSINOPHIL NFR BLD AUTO: 4.5 %
ERYTHROCYTE [DISTWIDTH] IN BLOOD BY AUTOMATED COUNT: 14.9 % (ref 11.5–17)
GFR SERPLBLD CREATININE-BSD FMLA CKD-EPI: >60 MLS/MIN/1.73/M2
GLOBULIN SER-MCNC: 3.4 GM/DL (ref 2.4–3.5)
GLUCOSE SERPL-MCNC: 116 MG/DL (ref 82–115)
HCT VFR BLD AUTO: 33.5 % (ref 42–52)
HGB BLD-MCNC: 10.6 G/DL (ref 14–18)
IMM GRANULOCYTES # BLD AUTO: 0.15 X10(3)/MCL (ref 0–0.04)
IMM GRANULOCYTES NFR BLD AUTO: 1.8 %
LYMPHOCYTES # BLD AUTO: 0.96 X10(3)/MCL (ref 0.6–4.6)
LYMPHOCYTES NFR BLD AUTO: 11.4 %
MAGNESIUM SERPL-MCNC: 2.2 MG/DL (ref 1.6–2.6)
MCH RBC QN AUTO: 29 PG (ref 27–31)
MCHC RBC AUTO-ENTMCNC: 31.6 G/DL (ref 33–36)
MCV RBC AUTO: 91.8 FL (ref 80–94)
MONOCYTES # BLD AUTO: 0.52 X10(3)/MCL (ref 0.1–1.3)
MONOCYTES NFR BLD AUTO: 6.2 %
NEUTROPHILS # BLD AUTO: 6.37 X10(3)/MCL (ref 2.1–9.2)
NEUTROPHILS NFR BLD AUTO: 75.6 %
NRBC BLD AUTO-RTO: 0.2 %
PHOSPHATE SERPL-MCNC: 3.3 MG/DL (ref 2.3–4.7)
PLATELET # BLD AUTO: 334 X10(3)/MCL (ref 130–400)
PMV BLD AUTO: 10.9 FL (ref 7.4–10.4)
POCT GLUCOSE: 127 MG/DL (ref 70–110)
POCT GLUCOSE: 73 MG/DL (ref 70–110)
POCT GLUCOSE: 91 MG/DL (ref 70–110)
POTASSIUM SERPL-SCNC: 3.6 MMOL/L (ref 3.5–5.1)
PROT SERPL-MCNC: 5.6 GM/DL (ref 5.8–7.6)
RBC # BLD AUTO: 3.65 X10(6)/MCL (ref 4.7–6.1)
SODIUM SERPL-SCNC: 149 MMOL/L (ref 136–145)
WBC # SPEC AUTO: 8.42 X10(3)/MCL (ref 4.5–11.5)

## 2024-01-03 PROCEDURE — 83735 ASSAY OF MAGNESIUM: CPT | Performed by: INTERNAL MEDICINE

## 2024-01-03 PROCEDURE — 99900035 HC TECH TIME PER 15 MIN (STAT)

## 2024-01-03 PROCEDURE — 94003 VENT MGMT INPAT SUBQ DAY: CPT

## 2024-01-03 PROCEDURE — 25000003 PHARM REV CODE 250: Performed by: NURSE PRACTITIONER

## 2024-01-03 PROCEDURE — 84100 ASSAY OF PHOSPHORUS: CPT | Performed by: INTERNAL MEDICINE

## 2024-01-03 PROCEDURE — 99900026 HC AIRWAY MAINTENANCE (STAT)

## 2024-01-03 PROCEDURE — 25000003 PHARM REV CODE 250: Performed by: STUDENT IN AN ORGANIZED HEALTH CARE EDUCATION/TRAINING PROGRAM

## 2024-01-03 PROCEDURE — 63600175 PHARM REV CODE 636 W HCPCS

## 2024-01-03 PROCEDURE — 99024 POSTOP FOLLOW-UP VISIT: CPT | Mod: ,,, | Performed by: NEUROLOGICAL SURGERY

## 2024-01-03 PROCEDURE — 80053 COMPREHEN METABOLIC PANEL: CPT | Performed by: INTERNAL MEDICINE

## 2024-01-03 PROCEDURE — 85025 COMPLETE CBC W/AUTO DIFF WBC: CPT | Performed by: INTERNAL MEDICINE

## 2024-01-03 PROCEDURE — 25000003 PHARM REV CODE 250

## 2024-01-03 PROCEDURE — 63600175 PHARM REV CODE 636 W HCPCS: Performed by: INTERNAL MEDICINE

## 2024-01-03 PROCEDURE — 99900022

## 2024-01-03 PROCEDURE — 94761 N-INVAS EAR/PLS OXIMETRY MLT: CPT

## 2024-01-03 PROCEDURE — 25000003 PHARM REV CODE 250: Performed by: INTERNAL MEDICINE

## 2024-01-03 PROCEDURE — 63600175 PHARM REV CODE 636 W HCPCS: Performed by: STUDENT IN AN ORGANIZED HEALTH CARE EDUCATION/TRAINING PROGRAM

## 2024-01-03 PROCEDURE — 20000000 HC ICU ROOM

## 2024-01-03 PROCEDURE — 27200966 HC CLOSED SUCTION SYSTEM

## 2024-01-03 PROCEDURE — 27100171 HC OXYGEN HIGH FLOW UP TO 24 HOURS

## 2024-01-03 RX ORDER — ATORVASTATIN CALCIUM 10 MG/1
10 TABLET, FILM COATED ORAL DAILY
Status: DISCONTINUED | OUTPATIENT
Start: 2024-01-03 | End: 2024-01-05

## 2024-01-03 RX ADMIN — HYDRALAZINE HYDROCHLORIDE 10 MG: 20 INJECTION INTRAMUSCULAR; INTRAVENOUS at 11:01

## 2024-01-03 RX ADMIN — ASPIRIN 81 MG CHEWABLE TABLET 81 MG: 81 TABLET CHEWABLE at 08:01

## 2024-01-03 RX ADMIN — POLYETHYLENE GLYCOL 3350 17 G: 17 POWDER, FOR SOLUTION ORAL at 04:01

## 2024-01-03 RX ADMIN — DEXMEDETOMIDINE HYDROCHLORIDE 1 MCG/KG/HR: 4 INJECTION INTRAVENOUS at 04:01

## 2024-01-03 RX ADMIN — DEXMEDETOMIDINE HYDROCHLORIDE 1 MCG/KG/HR: 4 INJECTION INTRAVENOUS at 01:01

## 2024-01-03 RX ADMIN — DEXMEDETOMIDINE HYDROCHLORIDE 0.8 MCG/KG/HR: 4 INJECTION INTRAVENOUS at 08:01

## 2024-01-03 RX ADMIN — LOSARTAN POTASSIUM 50 MG: 50 TABLET, FILM COATED ORAL at 08:01

## 2024-01-03 RX ADMIN — DEXMEDETOMIDINE HYDROCHLORIDE 1 MCG/KG/HR: 4 INJECTION INTRAVENOUS at 09:01

## 2024-01-03 RX ADMIN — DOCUSATE SODIUM 100 MG: 50 LIQUID ORAL at 08:01

## 2024-01-03 RX ADMIN — FENTANYL CITRATE 50 MCG: 50 INJECTION, SOLUTION INTRAMUSCULAR; INTRAVENOUS at 03:01

## 2024-01-03 RX ADMIN — LEVETIRACETAM INJECTION 1000 MG: 10 INJECTION INTRAVENOUS at 08:01

## 2024-01-03 RX ADMIN — ATORVASTATIN CALCIUM 10 MG: 10 TABLET, FILM COATED ORAL at 04:01

## 2024-01-03 RX ADMIN — DEXMEDETOMIDINE HYDROCHLORIDE 0.8 MCG/KG/HR: 4 INJECTION INTRAVENOUS at 12:01

## 2024-01-03 RX ADMIN — ENOXAPARIN SODIUM 120 MG: 150 INJECTION SUBCUTANEOUS at 10:01

## 2024-01-03 RX ADMIN — CARVEDILOL 3.12 MG: 3.12 TABLET, FILM COATED ORAL at 08:01

## 2024-01-03 RX ADMIN — DILTIAZEM HYDROCHLORIDE 240 MG: 60 TABLET, FILM COATED ORAL at 08:01

## 2024-01-03 RX ADMIN — DEXMEDETOMIDINE HYDROCHLORIDE 1 MCG/KG/HR: 4 INJECTION INTRAVENOUS at 05:01

## 2024-01-03 RX ADMIN — FAMOTIDINE 20 MG: 10 INJECTION, SOLUTION INTRAVENOUS at 08:01

## 2024-01-03 NOTE — PROGRESS NOTES
"Gastroenterology Progress Note    Subjective/Interval History:  Tolerating continuous TFs at 30 cc/hr.  No residuals per nursing and no issues with medications per PEG.      ROS:  Review of Systems   Unable to perform ROS: Critical illness       Vital Signs:  BP (!) 160/93   Pulse 91   Temp 99.2 °F (37.3 °C) (Oral)   Resp (!) 21   Ht 5' 10.98" (1.803 m)   Wt 119.9 kg (264 lb 5.3 oz)   SpO2 98%   BMI 36.88 kg/m²   Body mass index is 36.88 kg/m².    Physical Exam:  Physical Exam  Constitutional:       General: He is not in acute distress.     Appearance: He is not ill-appearing.   Eyes:      General: No scleral icterus.  Cardiovascular:      Rate and Rhythm: Normal rate.   Pulmonary:      Effort: Pulmonary effort is normal. No respiratory distress.      Comments: trach on vent  Abdominal:      General: Bowel sounds are normal. There is no distension.      Palpations: Abdomen is soft. There is no mass.      Tenderness: There is no abdominal tenderness. There is no guarding or rebound.      Comments: Midline abdominal surgical incision from prior SBO/SBR.  PEG in LUQ, c/d/I with external bumper at 4cm and continuous tfs going at 30 cc/hr  Skin:     General: Skin is warm and dry.   Neurological:      Comments: Minimally responsive. does not follow commands.     Labs:  Recent Results (from the past 48 hour(s))   POCT glucose    Collection Time: 01/02/24 12:14 AM   Result Value Ref Range    POCT Glucose 129 (H) 70 - 110 mg/dL   Comprehensive Metabolic Panel    Collection Time: 01/02/24  1:04 AM   Result Value Ref Range    Sodium Level 149 (H) 136 - 145 mmol/L    Potassium Level 3.7 3.5 - 5.1 mmol/L    Chloride 114 (H) 98 - 107 mmol/L    Carbon Dioxide 26 23 - 31 mmol/L    Glucose Level 137 (H) 82 - 115 mg/dL    Blood Urea Nitrogen 19.6 8.4 - 25.7 mg/dL    Creatinine 0.78 0.73 - 1.18 mg/dL    Calcium Level Total 7.9 (L) 8.8 - 10.0 mg/dL    Protein Total 5.3 (L) 5.8 - 7.6 gm/dL    Albumin Level 2.1 (L) 3.4 - 4.8 g/dL "    Globulin 3.2 2.4 - 3.5 gm/dL    Albumin/Globulin Ratio 0.7 (L) 1.1 - 2.0 ratio    Bilirubin Total 0.8 <=1.5 mg/dL    Alkaline Phosphatase 50 40 - 150 unit/L    Alanine Aminotransferase 39 0 - 55 unit/L    Aspartate Aminotransferase 35 (H) 5 - 34 unit/L    eGFR >60 mls/min/1.73/m2   Magnesium    Collection Time: 01/02/24  1:04 AM   Result Value Ref Range    Magnesium Level 2.20 1.60 - 2.60 mg/dL   Phosphorus    Collection Time: 01/02/24  1:04 AM   Result Value Ref Range    Phosphorus Level 2.6 2.3 - 4.7 mg/dL   CBC with Differential    Collection Time: 01/02/24  1:04 AM   Result Value Ref Range    WBC 8.75 4.50 - 11.50 x10(3)/mcL    RBC 3.62 (L) 4.70 - 6.10 x10(6)/mcL    Hgb 10.4 (L) 14.0 - 18.0 g/dL    Hct 33.4 (L) 42.0 - 52.0 %    MCV 92.3 80.0 - 94.0 fL    MCH 28.7 27.0 - 31.0 pg    MCHC 31.1 (L) 33.0 - 36.0 g/dL    RDW 15.2 11.5 - 17.0 %    Platelet 285 130 - 400 x10(3)/mcL    MPV 11.3 (H) 7.4 - 10.4 fL    Neut % 75.2 %    Lymph % 14.9 %    Mono % 5.3 %    Eos % 2.4 %    Basophil % 0.3 %    Lymph # 1.30 0.6 - 4.6 x10(3)/mcL    Neut # 6.58 2.1 - 9.2 x10(3)/mcL    Mono # 0.46 0.1 - 1.3 x10(3)/mcL    Eos # 0.21 0 - 0.9 x10(3)/mcL    Baso # 0.03 <=0.2 x10(3)/mcL    IG# 0.17 (H) 0 - 0.04 x10(3)/mcL    IG% 1.9 %    NRBC% 0.5 %   Protime-INR    Collection Time: 01/02/24  1:08 AM   Result Value Ref Range    PT 14.4 12.5 - 14.5 seconds    INR 1.1 <=1.3   POCT glucose    Collection Time: 01/02/24  6:16 PM   Result Value Ref Range    POCT Glucose 103 70 - 110 mg/dL   POCT glucose    Collection Time: 01/03/24 12:18 AM   Result Value Ref Range    POCT Glucose 91 70 - 110 mg/dL   POCT glucose    Collection Time: 01/03/24  6:04 AM   Result Value Ref Range    POCT Glucose 73 70 - 110 mg/dL   Comprehensive Metabolic Panel    Collection Time: 01/03/24  7:17 AM   Result Value Ref Range    Sodium Level 149 (H) 136 - 145 mmol/L    Potassium Level 3.6 3.5 - 5.1 mmol/L    Chloride 111 (H) 98 - 107 mmol/L    Carbon Dioxide 31 23 - 31  mmol/L    Glucose Level 116 (H) 82 - 115 mg/dL    Blood Urea Nitrogen 16.5 8.4 - 25.7 mg/dL    Creatinine 0.81 0.73 - 1.18 mg/dL    Calcium Level Total 7.9 (L) 8.8 - 10.0 mg/dL    Protein Total 5.6 (L) 5.8 - 7.6 gm/dL    Albumin Level 2.2 (L) 3.4 - 4.8 g/dL    Globulin 3.4 2.4 - 3.5 gm/dL    Albumin/Globulin Ratio 0.6 (L) 1.1 - 2.0 ratio    Bilirubin Total 0.7 <=1.5 mg/dL    Alkaline Phosphatase 58 40 - 150 unit/L    Alanine Aminotransferase 46 0 - 55 unit/L    Aspartate Aminotransferase 34 5 - 34 unit/L    eGFR >60 mls/min/1.73/m2   Magnesium    Collection Time: 01/03/24  7:17 AM   Result Value Ref Range    Magnesium Level 2.20 1.60 - 2.60 mg/dL   Phosphorus    Collection Time: 01/03/24  7:17 AM   Result Value Ref Range    Phosphorus Level 3.3 2.3 - 4.7 mg/dL   CBC with Differential    Collection Time: 01/03/24  7:17 AM   Result Value Ref Range    WBC 8.42 4.50 - 11.50 x10(3)/mcL    RBC 3.65 (L) 4.70 - 6.10 x10(6)/mcL    Hgb 10.6 (L) 14.0 - 18.0 g/dL    Hct 33.5 (L) 42.0 - 52.0 %    MCV 91.8 80.0 - 94.0 fL    MCH 29.0 27.0 - 31.0 pg    MCHC 31.6 (L) 33.0 - 36.0 g/dL    RDW 14.9 11.5 - 17.0 %    Platelet 334 130 - 400 x10(3)/mcL    MPV 10.9 (H) 7.4 - 10.4 fL    Neut % 75.6 %    Lymph % 11.4 %    Mono % 6.2 %    Eos % 4.5 %    Basophil % 0.5 %    Lymph # 0.96 0.6 - 4.6 x10(3)/mcL    Neut # 6.37 2.1 - 9.2 x10(3)/mcL    Mono # 0.52 0.1 - 1.3 x10(3)/mcL    Eos # 0.38 0 - 0.9 x10(3)/mcL    Baso # 0.04 <=0.2 x10(3)/mcL    IG# 0.15 (H) 0 - 0.04 x10(3)/mcL    IG% 1.8 %    NRBC% 0.2 %         Assessment/Plan:  68-year-old AA male unknown to our group (primary GI is Dr. Marielos Day at 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 Admitted with acute CVA s/p thrombectomy s/p craniectomy with hemorrhagic conversion.   Hospital course complicated by superficial thrombus and left cephalic vein, AFib RVR, acute hypoxemic respiratory failure requiring intubation.  Continues to have altered neurological status.  They are plans for tracheostomy.  GI consulted for PEG.     1. Need for alternative means of nutrition  s/p EGD/PEG 1/2/24  - Continue TFs and advance as tolerated to goal as per set orders.   - PEG site care.     PEG looks good and is without malfunction.  Please call GI if needed.         Darlyn Singh PA-C

## 2024-01-03 NOTE — CARE UPDATE
[id number] Spoke with pt's nurse who reported Ari called and left Carmen's #[phone] to call re [facility]. Left a voice message for [facility]en santos [facility]

## 2024-01-03 NOTE — PROGRESS NOTES
POD#14 right craniectomy for CVA due to thrombosis of right middle cerebral artery     No major issues.  Status post PEG tube  Trach in place   Patient did not tolerate CPAP trial for very long.  Neurologically unchanged.  Foul odor coming from oropharynx area.        Neurologically unchanged.  Does not really respond to verbal stim.  Does not follow commands.  Continues with non purposeful movements to right upper extremity.    Incision open to air  Flap full but not tense.  Unchanged.        Plan:     Bone flap precautions  Helmet is at bedside    Continue BP parameters per neurology  Keppra BID, seizure precautions  SCDs for DVT prophylaxis, on full-dose Lovenox for cephalic vein thrombosis  Helmet at bedside.  To be worn when out of bed.  PTOT if patient becomes appropriate by chance.    Remove staples today.-spoke to nurse.  Placement LTAC versus nursing home.      Nothing else to add from a neurosurgical standpoint.        Post-Op Info:  Procedure(s) (LRB):  PEG (N/A)   1 Day Post-Op      Medications:  Continuous Infusions:   dexmedeTOMIDine (Precedex) infusion (titrating) 0.8 mcg/kg/hr (01/03/24 1000)    fentanyl Stopped (12/31/23 0634)    NORepinephrine bitartrate-D5W Stopped (12/20/23 1929)     Scheduled Meds:   aspirin  81 mg Per OG tube Daily    carvediloL  3.125 mg Oral BID    diltiaZEM  240 mg Per OG tube Daily    docusate  100 mg Oral BID    enoxparin  1 mg/kg Subcutaneous Q12H (treatment, non-standard time)    famotidine (PF)  20 mg Intravenous Daily    levETIRAcetam (Keppra) IV (PEDS and ADULTS)  1,000 mg Intravenous Q12H    losartan  50 mg Per OG tube Daily    mannitol 20%  75 g Intravenous Once    polyethylene glycol  17 g Per NG tube TID    sennosides 8.8 mg/5 ml  5 mL Oral BID     PRN Meds:0.9%  NaCl infusion (for blood administration), acetaminophen, dextrose 10%, dextrose 10%, fentaNYL, glucagon (human recombinant), hydrALAZINE, insulin aspart U-100, ipratropium, labetalol, levalbuterol,  metoprolol, ondansetron, polyethylene glycol, sodium chloride 0.9%     Review of Systems  Objective:     Weight: 119.9 kg (264 lb 5.3 oz)  Body mass index is 36.88 kg/m².  Vital Signs (Most Recent):  Temp: 99.2 °F (37.3 °C) (01/03/24 0730)  Pulse: 61 (01/03/24 1015)  Resp: 20 (01/03/24 1015)  BP: 120/81 (01/03/24 1015)  SpO2: (!) 94 % (01/03/24 1015) Vital Signs (24h Range):  Temp:  [98.4 °F (36.9 °C)-99.5 °F (37.5 °C)] 99.2 °F (37.3 °C)  Pulse:  [60-98] 61  Resp:  [16-25] 20  SpO2:  [92 %-100 %] 94 %  BP: (120-172)/() 120/81     Date 01/03/24 0700 - 01/04/24 0659   Shift 2487-8631 [id number] 24 Hour Total   INTAKE   I.V.(mL/kg) 67(0.6)   67(0.6)   IV Piggyback 97.5   97.5   Shift Total(mL/kg) 164.5(1.4)   164.5(1.4)   OUTPUT   Shift Total(mL/kg)       Weight (kg) 119.9 119.9 119.9 119.9                Vent Mode: A/C  Oxygen Concentration (%):  [40] 40  Resp Rate Total:  [20 br/min-24 br/min] 20 br/min  Vt Set:  [460 mL] 460 mL  PEEP/CPAP:  [5 cmH20] 5 cmH20  Mean Airway Pressure:  [10 [id number] cmH20] 10 cmH20             Closed/Suction Drain Right Scalp (Active)   Site Description Unable to view 12/23/23 0800   Dressing Type Gauze 12/23/23 0800   Dressing Status Clean;Dry;Intact 12/23/23 0800   Dressing Intervention Integrity maintained 12/23/23 0800   Drainage Serosanguineous 12/23/23 0800   Status To bulb suction 12/23/23 0800   Output (mL) 15 mL 12/22/23 1841            NG/OG Tube 12/20/23 0930 16 Fr. Center mouth (Active)   Placement Check placement verified by aspirate characteristics 12/23/23 0800   Distal Tube Length (cm) 75 12/23/23 0800   Tolerance no signs/symptoms of discomfort 12/23/23 0800   Securement secured to commercial device 12/23/23 0800   Clamp Status/Tolerance unclamped 12/23/23 0800   Suction Setting/Drainage Method suction at;low;intermittent setting 12/23/23 0800   Insertion Site Appearance no redness, warmth, tenderness, skin breakdown, drainage 12/23/23 0800   Drainage  "Green;Bile;Brown 12/23/23 0800   Flush/Irrigation flushed w/;water;no resistance met 12/23/23 0800   Tube Output(mL)(Include Discarded Residual) 300 mL 12/22/23 0653            Urethral Catheter 12/20/23 1007 (Active)   $ Fernandez Insertion Bedside Insertion Performed 12/20/23 0930   Site Assessment Clean;Intact;Dry 12/23/23 0800   Collection Container Standard drainage bag 12/23/23 0800   Securement Method secured to top of thigh w/ adhesive device 12/23/23 0800   Catheter Care Performed yes 12/23/23 0800   Reason for Continuing Urinary Catheterization Critically ill in ICU and requiring hourly monitoring of intake/output 12/23/23 0800   CAUTI Prevention Bundle Securement Device in place with 1" slack;Intact seal between catheter & drainage tubing;Drainage bag/urimeter off the floor;No dependent loops or kinks;Sheeting clip in use;Drainage bag/urimeter below bladder;Drainage bag/urimeter not overfilled (<2/3 full) 12/23/23 0800   Output (mL) 125 mL 12/23/23 0800       Neurosurgery Physical Exam    Intubated, sedated  GCS 7 T   E1, X8Mgucx, M5.    PERRLA sluggish bilaterally   Right arm spontaneously reaches up.     Minimal withdrawal to bilateral lower extremities to pain   Intact cough corneal gag  Right craniectomy site tense but depressible.  Staples in place.  To be removed today.      Significant Labs:  Recent Labs   Lab 01/02/24 0104 01/03/24  0717   * 149*   K 3.7 3.6   CO2 26 31   BUN 19.6 16.5   CREATININE 0.78 0.81   CALCIUM 7.9* 7.9*   MG 2.20 2.20       Recent Labs   Lab 01/02/24 0104 01/03/24  0717   WBC 8.75 8.42   HGB 10.4* 10.6*   HCT 33.4* 33.5*    334       Recent Labs   Lab 01/02/24  0108   INR 1.1       Microbiology Results (last 7 days)       Procedure Component Value Units Date/Time    Blood Culture [[id number]]  (Normal) Collected: 12/23/23 1831    Order Status: Completed Specimen: Blood from Central Line Updated: 12/28/23 2000     CULTURE, BLOOD ([facility]) No Growth at 5 days    Blood " Culture [[id number]]  (Normal) Collected: 12/23/23 1831    Order Status: Completed Specimen: Blood from IV Site Updated: 12/28/23 2000     CULTURE, BLOOD ([facility]) No Growth at 5 days            Active Diagnoses:    Diagnosis Date Noted POA    PRINCIPAL PROBLEM:  Stroke [I63.9] 12/19/2023 Yes      Problems Resolved During this Admission:       [facility]  Neuro[facility]  [facility] - [facility]

## 2024-01-03 NOTE — PROGRESS NOTES
[facility] - [facility]  Pulmonary Critical Care Note    Patient Name: Delio Daniel Jr.  MRN: [id number]  Admission Date: 12/19/2023  Hospital Length of Stay: 15 days  Code Status: Full Code  Attending Provider: KODI Drake MD  Primary Care Provider: Candy, Primary Doctor     Subjective:     HPI:   Delio Daniel Jr. is a 68 y.o. 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 His last known normal was 9:15 per EMS. Stroke protocol in ED initiated. Unofficial CT head showed possible dense R MCA. Pt taken to cath lab for BRAD thrombectomy. Admitted to ICU for post-operative care and monitoring.     Hospital Course/Significant events:  12/19/2023 - Admitted to ICU s/p right internal carotid artery thrombectomy  12/20/2023 - 12/20/2023 he had a rapid deterioration in his neurologic status with CT showing, as expected, a large right MCA distribution infarct with marked right-to-left shift. s/p craniectomy  12/29/23 - s/p tracheotomy  1/2/2024- PEG tube placed     24 Hour Interval History:  Patient remains on vent at times he was able to tolerate some CPAP trial however today he became tachypneic and had to be placed back on a rate after a few hours.  He does not follow commands.     Past Medical History:   Diagnosis Date    Arthritis     Atrial fibrillation     BPH (benign prostatic hyperplasia)     Cardiac arrest     Coronary artery disease     Cyst, kidney, acquired     Diverticulosis     Hyperlipidemia     Hypertension     MI (myocardial infarction)     Obesity     Steatosis of liver        Past Surgical History:   Procedure Laterality Date    A-V  CARDIAC PACEMAKER INSERTION Right     CARDIAC CATHETERIZATION      COLONOSCOPY W/ BIOPSIES      CRANIECTOMY Right 12/20/2023    Procedure: CRANIECTOMY;  Surgeon: Artem Can MD;  Location: [facility] OR;  Service: Neurosurgery;  Laterality: Right;    excision of colon      TRACHEOSTOMY N/A 12/29/2023    Procedure: CREATION, TRACHEOSTOMY;  Surgeon: Patricia Winslow MD;  Location: [facility] OR;  Service: ENT;  Laterality: N/A;  REQ 1130 //  NEEDS 2 SCRUBS       Social History     Socioeconomic History    Marital status:     Number of children: 9   Occupational History    Occupation: retired   Tobacco Use    Smoking status: Former    Smokeless tobacco: Never   Substance and Sexual Activity    Alcohol use: Not Currently    Drug use: Not Currently    Sexual activity: Not Currently     Partners: Female     Social Determinants of Health     Financial Resource Strain: Low Risk  (10/19/2022)    Overall Financial Resource Strain (CARDIA)     Difficulty of Paying Living Expenses: Not hard at all   Food Insecurity: No Food Insecurity (10/19/2022)    Hunger Vital Sign     Worried About Running Out of Food in the Last Year: Never true     Ran Out of Food in the Last Year: Never true   Transportation Needs: No Transportation Needs (10/19/2022)    PRAPARE - Transportation     Lack of Transportation (Medical): No     Lack of Transportation (Non-Medical): No   Physical Activity: Sufficiently Active (10/19/2022)    Exercise Vital Sign     Days of Exercise per Week: 6 days     Minutes of Exercise per Session: 60 min   Stress: No Stress Concern Present (10/19/2022)    [employer] [place] [employer] - Occupational Stress Questionnaire     Feeling of Stress : Not at all   Social Connections: Unknown (10/19/2022)    Social Connection and Isolation Panel [NHANES]     Frequency of Communication with Friends and Family: More than three times a week     Frequency of Social Gatherings with Friends and Family: More than three times a  week     Attends Jain Services: More than 4 times per year     Active Member of Clubs or Organizations: No     Attends Club or Organization Meetings: Never   Housing Stability: Low Risk  (10/19/2022)    Housing Stability Vital Sign     Unable to Pay for Housing in the Last Year: No     Number of Places Lived in the Last Year: 1     Unstable Housing in the Last Year: No           Current Outpatient Medications   Medication Instructions    albuterol (PROVENTIL/VENTOLIN HFA) 90 mcg/actuation inhaler 2 puffs, Inhalation, Every 6 hours PRN, Rescue    aspirin 81 MG Chew chew and swallow 1 tablet by mouth daily    atorvastatin (LIPITOR) 40 mg, Oral, Daily    cetirizine (ZYRTEC) 10 mg, Oral, Daily    diltiaZEM (CARDIZEM CD) 360 mg, Oral, Daily    losartan (COZAAR) 50 mg, Oral, Daily    metoprolol succinate (TOPROL-XL) 200 mg, Oral, 2 times daily    omeprazole (PRILOSEC) 20 mg, Oral, Daily, One tab by mouth daily    potassium chloride (KLOR-CON) 20 mEq Pack 20 mEq, Oral, Daily    spironolactone (ALDACTONE) 50 mg, Oral, Daily    torsemide (DEMADEX) 10 mg, Oral, Daily    vitamin D (VITAMIN D3) 1,000 Units, Oral, Daily    XARELTO 20 mg Tab TAKE 1 TABLET BY MOUTH DAILY with SUPPER       Current Inpatient Medications   aspirin  81 mg Per OG tube Daily    carvediloL  3.125 mg Oral BID    diltiaZEM  240 mg Per OG tube Daily    docusate  100 mg Oral BID    enoxparin  1 mg/kg Subcutaneous Q12H (treatment, non-standard time)    famotidine (PF)  20 mg Intravenous Daily    levETIRAcetam (Keppra) IV (PEDS and ADULTS)  1,000 mg Intravenous Q12H    losartan  50 mg Per OG tube Daily    mannitol 20%  75 g Intravenous Once    polyethylene glycol  17 g Per NG tube TID    sennosides 8.8 mg/5 ml  5 mL Oral BID       Current Intravenous Infusions   dexmedeTOMIDine (Precedex) infusion (titrating) 0.8 mcg/kg/hr (01/03/24 0656)    fentanyl Stopped (12/31/23 0634)    NORepinephrine bitartrate-D5W Stopped (12/20/23 1929)         Review of Systems    Unable to perform ROS: Critical illness   All other systems reviewed and are negative.     Objective:       Intake/Output Summary (Last 24 hours) at 1/3/2024 0853  Last data filed at 1/3/2024 0656  Gross per 24 hour   Intake 813.13 ml   Output 1650 ml   Net -836.87 ml       Vital Signs (Most Recent):  Temp: 99.2 °F (37.3 °C) (01/03/24 0730)  Pulse: 91 (01/03/24 0730)  Resp: (!) 21 (01/03/24 0706)  BP: (!) 160/93 (01/03/24 0706)  SpO2: 98 % (01/03/24 0730)  Body mass index is 36.88 kg/m².  Weight: 119.9 kg (264 lb 5.3 oz) Vital Signs (24h Range):  Temp:  [98.4 °F (36.9 °C)-99.5 °F (37.5 °C)] 99.2 °F (37.3 °C)  Pulse:  [60-98] 91  Resp:  [17-25] 21  SpO2:  [92 %-100 %] 98 %  BP: (118-172)/() 160/93     Physical Exam  General: No acute distress.  Sedated  HEENT: Normocephalic, atraumatic. Face symmetric.    Cardiovascular: Regular rate & rhythm  Pulmonary: Bilateral symmetric chest rise, patient mechanically ventilated  Abdominal:  non-distended, soft, round, positive bowel sounds  Extremities: No clubbing or cyanosis.  1+ pitting edema BLE  Neuro:   trached on th vent; sedated on precedex.  Patient has non purposeful movements of RUE, does not follow commands.      Lines/Drains/Airways       Drain  Duration                  Urethral Catheter 12/20/23 1007 13 days         Gastrostomy/Enterostomy 01/02/24 1230 LUQ <1 day              Airway  Duration             Adult Surgical Airway 12/29/23 1229 4 days              Peripheral Intravenous Line  Duration                  Peripheral IV - Single Lumen 12/25/23 1705 20 G Anterior;Left;Proximal Forearm 8 days         Midline Catheter Insertion/Assessment  - Single Lumen 12/29/23 1400 Right cephalic vein (lateral side of arm) 4 days                    Significant Labs:    Lab Results   Component Value Date    WBC 8.42 01/03/2024    HGB 10.6 (L) 01/03/2024    HCT 33.5 (L) 01/03/2024    MCV 91.8 01/03/2024     01/03/2024       BMP  Lab Results   Component Value  Date     (H) 01/03/2024    K 3.6 01/03/2024    CHLORIDE 111 (H) 01/03/2024    CO2 31 01/03/2024    BUN 16.5 01/03/2024    CREATININE 0.81 01/03/2024    CALCIUM 7.9 (L) 01/03/2024    AGAP 8.0 12/20/2023    EGFRNONAA 61 04/23/2022     ABG  Recent Labs   Lab 12/31/23  0806   PH 7.450   PO2 89.0   PCO2 40.0   HCO3 27.8*   POCBASEDEF 3.50*         Mechanical Ventilation Support:  Vent Mode: A/C (01/03/24 0547)  Set Rate: 20 BPM (01/03/24 0547)  Vt Set: 460 mL (01/03/24 0547)  Pressure Support: 10 cmH20 (01/01/24 1200)  PEEP/CPAP: 5 cmH20 (01/03/24 0547)  Oxygen Concentration (%): 40 (01/03/24 0706)  Peak Airway Pressure: 29 cmH20 (01/03/24 0547)  Total Ve: 10 L/m (01/03/24 0547)  F/VT Ratio<105 (RSBI): (!) 44.94 (01/02/24 1600)      Significant Imaging:  No imaging this AM      Assessment/Plan:     Assessment  CVA s/p right ICA and MCA M3  branch thrombectomy s/p craniectomy with hemorrhagic conversion  Superficial thrombosis in left cephalic vein  Confirmed by DVT U/S on 12/26/2023  Acute hypoxic respiratory failure requiring intubation  Atrial fibrillation w/ RVR-rate now controlled.  Onychomycosis  HX of CAD, HTN, HLD, ADA  Hypernatremia  Urine osmolality indicative of extrarenal process, likely 2/2 NG tube as well as lack of intake      Plan  - free water flushes to PEG Currently at 210 ml q 4 hours  -will begin efforts in mechanical ventilation wean with trach  -continue to wean sedation as tolerated  -continue Miralax TID and Senna BID until BM achievement  -Appreciate the input of all consultants  -Remains on full-dose Lovenox for left cephalic vein thrombosis  -Will need to discuss placement - LTAC?? Will ask CM to see      MERLIN Brand-BC  Pulmonary Critical Care Medicine  [facility] - [facility]  DOS: 01/03/2024

## 2024-01-03 NOTE — CARE UPDATE
[id number] Called pt's dgtrAri ([phone]) re ltac; no answer. Left a voice message for zunilda Campos's other dgtr at [phone] re ltac. Explained in the message what ltac is and there are a few in the [place] area. Wait on call back.

## 2024-01-03 NOTE — CARE UPDATE
[id number] spoke with pt's dgtr Carmen santos ltac for pt. Explained what a ltac was. Answered questions. Left a list of ltac providers in pt's room for pt's dgtrs.

## 2024-01-04 LAB
ANION GAP SERPL CALC-SCNC: 7 MEQ/L
BUN SERPL-MCNC: 19.1 MG/DL (ref 8.4–25.7)
CALCIUM SERPL-MCNC: 8 MG/DL (ref 8.8–10)
CHLORIDE SERPL-SCNC: 110 MMOL/L (ref 98–107)
CO2 SERPL-SCNC: 29 MMOL/L (ref 23–31)
CREAT SERPL-MCNC: 0.73 MG/DL (ref 0.73–1.18)
CREAT/UREA NIT SERPL: 26
GFR SERPLBLD CREATININE-BSD FMLA CKD-EPI: >60 MLS/MIN/1.73/M2
GLUCOSE SERPL-MCNC: 124 MG/DL (ref 82–115)
POCT GLUCOSE: 115 MG/DL (ref 70–110)
POCT GLUCOSE: 115 MG/DL (ref 70–110)
POCT GLUCOSE: 123 MG/DL (ref 70–110)
POTASSIUM SERPL-SCNC: 3.7 MMOL/L (ref 3.5–5.1)
SODIUM SERPL-SCNC: 146 MMOL/L (ref 136–145)

## 2024-01-04 PROCEDURE — 25000003 PHARM REV CODE 250: Performed by: INTERNAL MEDICINE

## 2024-01-04 PROCEDURE — 63600175 PHARM REV CODE 636 W HCPCS

## 2024-01-04 PROCEDURE — 63600175 PHARM REV CODE 636 W HCPCS: Performed by: INTERNAL MEDICINE

## 2024-01-04 PROCEDURE — 80048 BASIC METABOLIC PNL TOTAL CA: CPT | Performed by: NURSE PRACTITIONER

## 2024-01-04 PROCEDURE — 94003 VENT MGMT INPAT SUBQ DAY: CPT

## 2024-01-04 PROCEDURE — 96372 THER/PROPH/DIAG INJ SC/IM: CPT

## 2024-01-04 PROCEDURE — 99900031 HC PATIENT EDUCATION (STAT)

## 2024-01-04 PROCEDURE — 25000003 PHARM REV CODE 250: Performed by: STUDENT IN AN ORGANIZED HEALTH CARE EDUCATION/TRAINING PROGRAM

## 2024-01-04 PROCEDURE — 27100171 HC OXYGEN HIGH FLOW UP TO 24 HOURS

## 2024-01-04 PROCEDURE — 25000003 PHARM REV CODE 250

## 2024-01-04 PROCEDURE — 27200966 HC CLOSED SUCTION SYSTEM

## 2024-01-04 PROCEDURE — 25000003 PHARM REV CODE 250: Performed by: NURSE PRACTITIONER

## 2024-01-04 PROCEDURE — 99900026 HC AIRWAY MAINTENANCE (STAT)

## 2024-01-04 PROCEDURE — 99900035 HC TECH TIME PER 15 MIN (STAT)

## 2024-01-04 PROCEDURE — 20000000 HC ICU ROOM

## 2024-01-04 PROCEDURE — 94761 N-INVAS EAR/PLS OXIMETRY MLT: CPT

## 2024-01-04 RX ADMIN — DEXMEDETOMIDINE HYDROCHLORIDE 1 MCG/KG/HR: 4 INJECTION INTRAVENOUS at 01:01

## 2024-01-04 RX ADMIN — DEXMEDETOMIDINE HYDROCHLORIDE 1 MCG/KG/HR: 4 INJECTION INTRAVENOUS at 04:01

## 2024-01-04 RX ADMIN — LEVETIRACETAM INJECTION 1000 MG: 10 INJECTION INTRAVENOUS at 09:01

## 2024-01-04 RX ADMIN — CARVEDILOL 3.12 MG: 3.12 TABLET, FILM COATED ORAL at 09:01

## 2024-01-04 RX ADMIN — FAMOTIDINE 20 MG: 10 INJECTION, SOLUTION INTRAVENOUS at 09:01

## 2024-01-04 RX ADMIN — ENOXAPARIN SODIUM 120 MG: 150 INJECTION SUBCUTANEOUS at 10:01

## 2024-01-04 RX ADMIN — LEVETIRACETAM INJECTION 1000 MG: 10 INJECTION INTRAVENOUS at 08:01

## 2024-01-04 RX ADMIN — DEXMEDETOMIDINE HYDROCHLORIDE 1 MCG/KG/HR: 4 INJECTION INTRAVENOUS at 12:01

## 2024-01-04 RX ADMIN — DOCUSATE SODIUM 100 MG: 50 LIQUID ORAL at 09:01

## 2024-01-04 RX ADMIN — POLYETHYLENE GLYCOL 3350 17 G: 17 POWDER, FOR SOLUTION ORAL at 09:01

## 2024-01-04 RX ADMIN — DEXMEDETOMIDINE HYDROCHLORIDE 1 MCG/KG/HR: 4 INJECTION INTRAVENOUS at 08:01

## 2024-01-04 RX ADMIN — DOCUSATE SODIUM 100 MG: 50 LIQUID ORAL at 08:01

## 2024-01-04 RX ADMIN — DEXMEDETOMIDINE HYDROCHLORIDE 1 MCG/KG/HR: 4 INJECTION INTRAVENOUS at 05:01

## 2024-01-04 RX ADMIN — DEXMEDETOMIDINE HYDROCHLORIDE 1 MCG/KG/HR: 4 INJECTION INTRAVENOUS at 09:01

## 2024-01-04 RX ADMIN — CARVEDILOL 3.12 MG: 3.12 TABLET, FILM COATED ORAL at 08:01

## 2024-01-04 RX ADMIN — ASPIRIN 81 MG CHEWABLE TABLET 81 MG: 81 TABLET CHEWABLE at 09:01

## 2024-01-04 RX ADMIN — DILTIAZEM HYDROCHLORIDE 240 MG: 60 TABLET, FILM COATED ORAL at 09:01

## 2024-01-04 RX ADMIN — LOSARTAN POTASSIUM 50 MG: 50 TABLET, FILM COATED ORAL at 09:01

## 2024-01-04 RX ADMIN — FENTANYL CITRATE 50 MCG: 50 INJECTION, SOLUTION INTRAMUSCULAR; INTRAVENOUS at 04:01

## 2024-01-04 RX ADMIN — ATORVASTATIN CALCIUM 10 MG: 10 TABLET, FILM COATED ORAL at 09:01

## 2024-01-04 NOTE — PROGRESS NOTES
[facility] - [facility]  Pulmonary Critical Care Note    Patient Name: Delio Daniel Jr.  MRN: [id number]  Admission Date: 12/19/2023  Hospital Length of Stay: 16 days  Code Status: Full Code  Attending Provider: KODI Drake MD  Primary Care Provider: Candy, Primary Doctor     Subjective:     HPI:   Delio Daniel Jr. is a 68 y.o. 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 His last known normal was 9:15 per EMS. Stroke protocol in ED initiated. Unofficial CT head showed possible dense R MCA. Pt taken to cath lab for BRAD thrombectomy. Admitted to ICU for post-operative care and monitoring.     Hospital Course/Significant events:  12/19/2023 - Admitted to ICU s/p right internal carotid artery thrombectomy  12/20/2023 - 12/20/2023 he had a rapid deterioration in his neurologic status with CT showing, as expected, a large right MCA distribution infarct with marked right-to-left shift. s/p craniectomy  12/29/23 - s/p tracheotomy  1/2/2024- PEG tube placed     24 Hour Interval History:  ALIREZA. VSS. Contiue to remain on vent at times; was able to tolerate some CPAP trial however today he became tachypneic and had to be placed back on a rate after a few hours. He does not follow commands. Currently pending placement ltac vs nursing home.     Past Medical History:   Diagnosis Date    Arthritis     Atrial fibrillation     BPH (benign prostatic hyperplasia)     Cardiac arrest     Coronary artery disease     Cyst, kidney, acquired     Diverticulosis     Hyperlipidemia     Hypertension     MI (myocardial infarction)     Obesity     Steatosis of liver       Past Surgical History:   Procedure Laterality Date    A-V CARDIAC PACEMAKER INSERTION Right     CARDIAC CATHETERIZATION      COLONOSCOPY W/ BIOPSIES      CRANIECTOMY Right 12/20/2023    Procedure: CRANIECTOMY;  Surgeon: Artem Can MD;  Location: [facility] OR;  Service: Neurosurgery;  Laterality: Right;    excision of colon      TRACHEOSTOMY N/A 12/29/2023    Procedure: CREATION, TRACHEOSTOMY;  Surgeon: Patricia Winslow MD;  Location: [facility] OR;  Service: ENT;  Laterality: N/A;  REQ 1130 //  NEEDS 2 SCRUBS       Social History     Socioeconomic History    Marital status:     Number of children: 9   Occupational History    Occupation: retired   Tobacco Use    Smoking status: Former    Smokeless tobacco: Never   Substance and Sexual Activity    Alcohol use: Not Currently    Drug use: Not Currently    Sexual activity: Not Currently     Partners: Female     Social Determinants of Health     Financial Resource Strain: Low Risk  (10/19/2022)    Overall Financial Resource Strain (CARDIA)     Difficulty of Paying Living Expenses: Not hard at all   Food Insecurity: No Food Insecurity (10/19/2022)    Hunger Vital Sign     Worried About Running Out of Food in the Last Year: Never true     Ran Out of Food in the Last Year: Never true   Transportation Needs: No Transportation Needs (10/19/2022)    PRAPARE - Transportation     Lack of Transportation (Medical): No     Lack of Transportation (Non-Medical): No   Physical Activity: Sufficiently Active (10/19/2022)    Exercise Vital Sign     Days of Exercise per Week: 6 days     Minutes of Exercise per Session: 60 min   Stress: No Stress Concern Present (10/19/2022)    [employer] [place] [employer] - Occupational Stress Questionnaire     Feeling of Stress : Not at all   Social Connections: Unknown (10/19/2022)    Social Connection and Isolation Panel [NHANES]     Frequency of Communication with Friends and Family: More than three times a week     Frequency of  Social Gatherings with Friends and Family: More than three times a week     Attends [employer] Services: More than 4 times per year     Active Member of Clubs or Organizations: No     Attends Club or Organization Meetings: Never   Housing Stability: Low Risk  (10/19/2022)    Housing Stability Vital Sign     Unable to Pay for Housing in the Last Year: No     Number of Places Lived in the Last Year: 1     Unstable Housing in the Last Year: No     Current Outpatient Medications   Medication Instructions    albuterol (PROVENTIL/VENTOLIN HFA) 90 mcg/actuation inhaler 2 puffs, Inhalation, Every 6 hours PRN, Rescue    aspirin 81 MG Chew chew and swallow 1 tablet by mouth daily    atorvastatin (LIPITOR) 40 mg, Oral, Daily    cetirizine (ZYRTEC) 10 mg, Oral, Daily    diltiaZEM (CARDIZEM CD) 360 mg, Oral, Daily    losartan (COZAAR) 50 mg, Oral, Daily    metoprolol succinate (TOPROL-XL) 200 mg, Oral, 2 times daily    omeprazole (PRILOSEC) 20 mg, Oral, Daily, One tab by mouth daily    potassium chloride (KLOR-CON) 20 mEq Pack 20 mEq, Oral, Daily    spironolactone (ALDACTONE) 50 mg, Oral, Daily    torsemide (DEMADEX) 10 mg, Oral, Daily    vitamin D (VITAMIN D3) 1,000 Units, Oral, Daily    XARELTO 20 mg Tab TAKE 1 TABLET BY MOUTH DAILY with SUPPER     Current Inpatient Medications   aspirin  81 mg Per OG tube Daily    atorvastatin  10 mg Per NG tube Daily    carvediloL  3.125 mg Oral BID    diltiaZEM  240 mg Per OG tube Daily    docusate  100 mg Oral BID    enoxparin  1 mg/kg Subcutaneous Q12H (treatment, non-standard time)    famotidine (PF)  20 mg Intravenous Daily    levETIRAcetam (Keppra) IV (PEDS and ADULTS)  1,000 mg Intravenous Q12H    losartan  50 mg Per OG tube Daily    mannitol 20%  75 g Intravenous Once    polyethylene glycol  17 g Per NG tube TID    sennosides 8.8 mg/5 ml  5 mL Oral BID     Current Intravenous Infusions   dexmedeTOMIDine (Precedex) infusion (titrating) 1 mcg/kg/hr (01/04/24 0125)    fentanyl Stopped  (12/31/23 0634)    NORepinephrine bitartrate-D5W Stopped (12/20/23 1929)       Review of Systems   Unable to perform ROS: Critical illness   All other systems reviewed and are negative.     Objective:       Intake/Output Summary (Last 24 hours) at 1/4/2024 0439  Last data filed at 1/3/2024 2000  Gross per 24 hour   Intake 1557.88 ml   Output 520 ml   Net 1037.88 ml       Vital Signs (Most Recent):  Temp: 98.3 °F (36.8 °C) (01/04/24 0000)  Pulse: 75 (01/04/24 0000)  Resp: (!) 21 (01/04/24 0000)  BP: (!) 135/93 (01/04/24 0000)  SpO2: 98 % (01/04/24 0000)  Body mass index is 36.88 kg/m².  Weight: 119.9 kg (264 lb 5.3 oz) Vital Signs (24h Range):  Temp:  [98.2 °F (36.8 °C)-99.2 °F (37.3 °C)] 98.3 °F (36.8 °C)  Pulse:  [60-96] 75  Resp:  [16-33] 21  SpO2:  [90 %-99 %] 98 %  BP: (105-167)/() 135/93     Physical Exam  General: No acute distress. Sleeping at this timee.  HEENT: Normocephalic, atraumatic. Face symmetric.    Cardiovascular: Regular rate & rhythm  Pulmonary: Bilateral symmetric chest rise, patient mechanically ventilated via trach  Abdominal:  non-distended, soft, round, positive bowel sounds  Extremities: No clubbing or cyanosis.  1+ pitting edema BLE  Neuro:   trached on th vent; sedated on precedex.  Patient has non purposeful movements of RUE, does not follow commands.      Lines/Drains/Airways       Drain  Duration                  Urethral Catheter 12/20/23 1007 14 days         Gastrostomy/Enterostomy 01/02/24 1230 LUQ 1 day              Airway  Duration             Adult Surgical Airway 12/29/23 1229 5 days              Peripheral Intravenous Line  Duration                  Peripheral IV - Single Lumen 12/25/23 1705 20 G Anterior;Left;Proximal Forearm 9 days         Midline Catheter Insertion/Assessment  - Single Lumen 12/29/23 1400 Right cephalic vein (lateral side of arm) 5 days                    Significant Labs:    Lab Results   Component Value Date    WBC 8.42 01/03/2024    HGB 10.6 (L)  01/03/2024    HCT 33.5 (L) 01/03/2024    MCV 91.8 01/03/2024     01/03/2024       BMP  Lab Results   Component Value Date     (H) 01/04/2024    K 3.7 01/04/2024    CHLORIDE 110 (H) 01/04/2024    CO2 29 01/04/2024    BUN 19.1 01/04/2024    CREATININE 0.73 01/04/2024    CALCIUM 8.0 (L) 01/04/2024    AGAP 7.0 01/04/2024    EGFRNONAA 61 04/23/2022     ABG  Recent Labs   Lab 12/31/23  0806   PH 7.450   PO2 89.0   PCO2 40.0   HCO3 27.8*   POCBASEDEF 3.50*       Mechanical Ventilation Support:  Vent Mode: A/C (01/04/24 0431)  Set Rate: 20 BPM (01/04/24 0431)  Vt Set: 460 mL (01/04/24 0431)  Pressure Support: 10 cmH20 (01/01/24 1200)  PEEP/CPAP: 5 cmH20 (01/04/24 0431)  Oxygen Concentration (%): 40 (01/04/24 0431)  Peak Airway Pressure: 35 cmH20 (01/04/24 0431)  Total Ve: 9.5 L/m (01/04/24 0431)  F/VT Ratio<105 (RSBI): (!) 42.11 (01/03/24 1618)      Significant Imaging:  No imaging this AM      Assessment/Plan:     Assessment  CVA s/p right ICA and MCA M3  branch thrombectomy s/p craniectomy with hemorrhagic conversion  Superficial thrombosis in left cephalic vein  Confirmed by DVT U/S on 12/26/2023  Acute hypoxic respiratory failure requiring intubation  Atrial fibrillation w/ RVR-rate now controlled.  Onychomycosis  HX of CAD, HTN, HLD, ADA  Hypernatremia  Urine osmolality indicative of extrarenal process, likely 2/2 NG tube as well as lack of intake      Plan  - free water flushes to PEG Currently at 210 ml q 4 hours. Initiate TF as tolerated.   -will begin efforts in mechanical ventilation wean with trach  -continue to wean sedation as tolerated  -continue Miralax TID and Senna BID until BM achievement  -Appreciate the input of all consultants  -Remains on full-dose Lovenox for left cephalic vein thrombosis  -Will need to discuss placement - LTAC vs nursing home. Will follow up with CM.      Gasper Barlow MD  Pulmonary Critical Care Medicine  [facility] - [address] [place] [address]  DOS: 01/04/2024

## 2024-01-04 NOTE — NURSING
Nurses Note -- 4 Eyes      1/4/2024   5:53 AM      Skin assessed during: Daily Assessment      [] No Altered Skin Integrity Present    [x]Prevention Measures Documented      [x] Yes- Altered Skin Integrity Present or Discovered   [] LDA Added if Not in Epic (Describe Wound)   [] New Altered Skin Integrity was Present on Admit and Documented in LDA   [] Wound Image Taken    Wound Care Consulted? No    Attending Nurse:  Mary Lou Johnson RN/Staff Member:  Bonita

## 2024-01-04 NOTE — CARE UPDATE
[id number] Spoke with pt's dgtr, Carmen who reported she did rec the list of ltacs and sent the list to her siblings. She will get with them and select a choice. I told her the sooner they can make a decision, the better it would be for their father. Carmen voiced understanding

## 2024-01-05 LAB
ALBUMIN SERPL-MCNC: 2.2 G/DL (ref 3.4–4.8)
ALBUMIN/GLOB SERPL: 0.7 RATIO (ref 1.1–2)
ALP SERPL-CCNC: 55 UNIT/L (ref 40–150)
ALT SERPL-CCNC: 32 UNIT/L (ref 0–55)
AST SERPL-CCNC: 27 UNIT/L (ref 5–34)
BASOPHILS # BLD AUTO: 0.04 X10(3)/MCL
BASOPHILS NFR BLD AUTO: 0.6 %
BILIRUB SERPL-MCNC: 0.6 MG/DL
BUN SERPL-MCNC: 21.2 MG/DL (ref 8.4–25.7)
CALCIUM SERPL-MCNC: 7.9 MG/DL (ref 8.8–10)
CHLORIDE SERPL-SCNC: 110 MMOL/L (ref 98–107)
CO2 SERPL-SCNC: 29 MMOL/L (ref 23–31)
CREAT SERPL-MCNC: 0.76 MG/DL (ref 0.73–1.18)
EOSINOPHIL # BLD AUTO: 0.43 X10(3)/MCL (ref 0–0.9)
EOSINOPHIL NFR BLD AUTO: 6.2 %
ERYTHROCYTE [DISTWIDTH] IN BLOOD BY AUTOMATED COUNT: 14.8 % (ref 11.5–17)
GFR SERPLBLD CREATININE-BSD FMLA CKD-EPI: >60 MLS/MIN/1.73/M2
GLOBULIN SER-MCNC: 3.3 GM/DL (ref 2.4–3.5)
GLUCOSE SERPL-MCNC: 111 MG/DL (ref 82–115)
HCT VFR BLD AUTO: 33.9 % (ref 42–52)
HGB BLD-MCNC: 10.7 G/DL (ref 14–18)
IMM GRANULOCYTES # BLD AUTO: 0.15 X10(3)/MCL (ref 0–0.04)
IMM GRANULOCYTES NFR BLD AUTO: 2.1 %
LYMPHOCYTES # BLD AUTO: 1.29 X10(3)/MCL (ref 0.6–4.6)
LYMPHOCYTES NFR BLD AUTO: 18.5 %
MAGNESIUM SERPL-MCNC: 2 MG/DL (ref 1.6–2.6)
MCH RBC QN AUTO: 28.5 PG (ref 27–31)
MCHC RBC AUTO-ENTMCNC: 31.6 G/DL (ref 33–36)
MCV RBC AUTO: 90.2 FL (ref 80–94)
MONOCYTES # BLD AUTO: 0.51 X10(3)/MCL (ref 0.1–1.3)
MONOCYTES NFR BLD AUTO: 7.3 %
NEUTROPHILS # BLD AUTO: 4.57 X10(3)/MCL (ref 2.1–9.2)
NEUTROPHILS NFR BLD AUTO: 65.3 %
NRBC BLD AUTO-RTO: 0 %
PLATELET # BLD AUTO: 271 X10(3)/MCL (ref 130–400)
PMV BLD AUTO: 12.3 FL (ref 7.4–10.4)
POTASSIUM SERPL-SCNC: 3.7 MMOL/L (ref 3.5–5.1)
PROT SERPL-MCNC: 5.5 GM/DL (ref 5.8–7.6)
RBC # BLD AUTO: 3.76 X10(6)/MCL (ref 4.7–6.1)
SODIUM SERPL-SCNC: 145 MMOL/L (ref 136–145)
WBC # SPEC AUTO: 6.99 X10(3)/MCL (ref 4.5–11.5)

## 2024-01-05 PROCEDURE — 63600175 PHARM REV CODE 636 W HCPCS: Performed by: INTERNAL MEDICINE

## 2024-01-05 PROCEDURE — 80053 COMPREHEN METABOLIC PANEL: CPT

## 2024-01-05 PROCEDURE — 25000003 PHARM REV CODE 250: Performed by: NURSE PRACTITIONER

## 2024-01-05 PROCEDURE — 63600175 PHARM REV CODE 636 W HCPCS

## 2024-01-05 PROCEDURE — 25000003 PHARM REV CODE 250: Performed by: INTERNAL MEDICINE

## 2024-01-05 PROCEDURE — 27100171 HC OXYGEN HIGH FLOW UP TO 24 HOURS

## 2024-01-05 PROCEDURE — 99900035 HC TECH TIME PER 15 MIN (STAT)

## 2024-01-05 PROCEDURE — 25000003 PHARM REV CODE 250

## 2024-01-05 PROCEDURE — 85025 COMPLETE CBC W/AUTO DIFF WBC: CPT

## 2024-01-05 PROCEDURE — 94003 VENT MGMT INPAT SUBQ DAY: CPT

## 2024-01-05 PROCEDURE — 83735 ASSAY OF MAGNESIUM: CPT

## 2024-01-05 PROCEDURE — 99900026 HC AIRWAY MAINTENANCE (STAT)

## 2024-01-05 PROCEDURE — 27200966 HC CLOSED SUCTION SYSTEM

## 2024-01-05 PROCEDURE — 94761 N-INVAS EAR/PLS OXIMETRY MLT: CPT

## 2024-01-05 PROCEDURE — 25000003 PHARM REV CODE 250: Performed by: STUDENT IN AN ORGANIZED HEALTH CARE EDUCATION/TRAINING PROGRAM

## 2024-01-05 PROCEDURE — 20000000 HC ICU ROOM

## 2024-01-05 RX ORDER — LOSARTAN POTASSIUM 50 MG/1
50 TABLET ORAL DAILY
Status: DISCONTINUED | OUTPATIENT
Start: 2024-01-06 | End: 2024-01-16 | Stop reason: HOSPADM

## 2024-01-05 RX ORDER — DILTIAZEM HYDROCHLORIDE 60 MG/1
240 TABLET, FILM COATED ORAL DAILY
Status: DISCONTINUED | OUTPATIENT
Start: 2024-01-06 | End: 2024-01-16 | Stop reason: HOSPADM

## 2024-01-05 RX ORDER — POLYETHYLENE GLYCOL 3350 17 G/17G
17 POWDER, FOR SOLUTION ORAL DAILY PRN
Status: DISCONTINUED | OUTPATIENT
Start: 2024-01-05 | End: 2024-01-16 | Stop reason: HOSPADM

## 2024-01-05 RX ORDER — ATORVASTATIN CALCIUM 10 MG/1
10 TABLET, FILM COATED ORAL DAILY
Status: DISCONTINUED | OUTPATIENT
Start: 2024-01-06 | End: 2024-01-16 | Stop reason: HOSPADM

## 2024-01-05 RX ORDER — ACETAMINOPHEN 650 MG/20.3ML
650 LIQUID ORAL EVERY 4 HOURS PRN
Status: DISCONTINUED | OUTPATIENT
Start: 2024-01-05 | End: 2024-01-16 | Stop reason: HOSPADM

## 2024-01-05 RX ORDER — CARVEDILOL 3.12 MG/1
3.12 TABLET ORAL 2 TIMES DAILY
Status: DISCONTINUED | OUTPATIENT
Start: 2024-01-05 | End: 2024-01-16 | Stop reason: HOSPADM

## 2024-01-05 RX ORDER — NAPROXEN SODIUM 220 MG/1
81 TABLET, FILM COATED ORAL DAILY
Status: DISCONTINUED | OUTPATIENT
Start: 2024-01-06 | End: 2024-01-16 | Stop reason: HOSPADM

## 2024-01-05 RX ORDER — DOCUSATE SODIUM 50 MG/5ML
100 LIQUID ORAL 2 TIMES DAILY
Status: DISCONTINUED | OUTPATIENT
Start: 2024-01-05 | End: 2024-01-16 | Stop reason: HOSPADM

## 2024-01-05 RX ORDER — SENNOSIDES 8.8 MG/5ML
5 LIQUID ORAL 2 TIMES DAILY
Status: DISCONTINUED | OUTPATIENT
Start: 2024-01-05 | End: 2024-01-06

## 2024-01-05 RX ORDER — POLYETHYLENE GLYCOL 3350 17 G/17G
17 POWDER, FOR SOLUTION ORAL 3 TIMES DAILY
Status: DISCONTINUED | OUTPATIENT
Start: 2024-01-05 | End: 2024-01-06

## 2024-01-05 RX ADMIN — ATORVASTATIN CALCIUM 10 MG: 10 TABLET, FILM COATED ORAL at 08:01

## 2024-01-05 RX ADMIN — DEXMEDETOMIDINE HYDROCHLORIDE 1 MCG/KG/HR: 4 INJECTION INTRAVENOUS at 12:01

## 2024-01-05 RX ADMIN — ENOXAPARIN SODIUM 120 MG: 150 INJECTION SUBCUTANEOUS at 10:01

## 2024-01-05 RX ADMIN — DEXMEDETOMIDINE HYDROCHLORIDE 1 MCG/KG/HR: 4 INJECTION INTRAVENOUS at 04:01

## 2024-01-05 RX ADMIN — LOSARTAN POTASSIUM 50 MG: 50 TABLET, FILM COATED ORAL at 08:01

## 2024-01-05 RX ADMIN — DEXMEDETOMIDINE HYDROCHLORIDE 1 MCG/KG/HR: 4 INJECTION INTRAVENOUS at 11:01

## 2024-01-05 RX ADMIN — LEVETIRACETAM INJECTION 1000 MG: 10 INJECTION INTRAVENOUS at 08:01

## 2024-01-05 RX ADMIN — CARVEDILOL 3.12 MG: 3.12 TABLET, FILM COATED ORAL at 08:01

## 2024-01-05 RX ADMIN — FAMOTIDINE 20 MG: 10 INJECTION, SOLUTION INTRAVENOUS at 08:01

## 2024-01-05 RX ADMIN — DOCUSATE SODIUM 100 MG: 50 LIQUID ORAL at 08:01

## 2024-01-05 RX ADMIN — DEXMEDETOMIDINE HYDROCHLORIDE 1 MCG/KG/HR: 4 INJECTION INTRAVENOUS at 03:01

## 2024-01-05 RX ADMIN — DILTIAZEM HYDROCHLORIDE 240 MG: 60 TABLET, FILM COATED ORAL at 08:01

## 2024-01-05 RX ADMIN — DEXMEDETOMIDINE HYDROCHLORIDE 1 MCG/KG/HR: 4 INJECTION INTRAVENOUS at 08:01

## 2024-01-05 RX ADMIN — ENOXAPARIN SODIUM 120 MG: 150 INJECTION SUBCUTANEOUS at 11:01

## 2024-01-05 RX ADMIN — ASPIRIN 81 MG CHEWABLE TABLET 81 MG: 81 TABLET CHEWABLE at 08:01

## 2024-01-05 NOTE — NURSING
Nurses Note -- 4 Eyes      1/5/2024   6:44 AM      Skin assessed during: Daily Assessment      [] No Altered Skin Integrity Present    []Prevention Measures Documented      [x] Yes- Altered Skin Integrity Present or Discovered   [] LDA Added if Not in Epic (Describe Wound)   [] New Altered Skin Integrity was Present on Admit and Documented in LDA   [] Wound Image Taken    Wound Care Consulted? No    Attending Nurse:  Mary Lou Johnson RN/Staff Member:  Yelitza

## 2024-01-05 NOTE — PROGRESS NOTES
[facility] - [facility] ICU  Pulmonary Critical Care Note    Patient Name: Delio Daniel Jr.  MRN: [id number]  Admission Date: 12/19/2023  Hospital Length of Stay: 17 days  Code Status: Full Code  Attending Provider: KODI Drake MD  Primary Care Provider: Candy, Primary Doctor     Subjective:     HPI:   Delio Daniel Jr. is a 68 y.o. 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 His last known normal was 9:15 per EMS. Stroke protocol in ED initiated. Unofficial CT head showed possible dense R MCA. Pt taken to cath lab for BRAD thrombectomy. Admitted to ICU for post-operative care and monitoring.     Hospital Course/Significant events:  12/19/2023 - Admitted to ICU s/p right internal carotid artery thrombectomy  12/20/2023 - 12/20/2023 he had a rapid deterioration in his neurologic status with CT showing, as expected, a large right MCA distribution infarct with marked right-to-left shift. s/p craniectomy  12/29/23 - s/p tracheotomy  1/2/2024- PEG tube placed     24 Hour Interval History:  No acute events over night. Vital signs remained stable. Remains on ventilation via tracheostomy. He does not follow commands. Currently pending placement ltac vs nursing home, CM working with family.      Past Medical History:   Diagnosis Date    Arthritis     Atrial fibrillation     BPH (benign prostatic hyperplasia)     Cardiac arrest     Coronary artery disease     Cyst, kidney, acquired     Diverticulosis     Hyperlipidemia     Hypertension     MI (myocardial infarction)     Obesity     Steatosis of liver      Past Surgical History:   Procedure Laterality Date     A-V CARDIAC PACEMAKER INSERTION Right     CARDIAC CATHETERIZATION      COLONOSCOPY W/ BIOPSIES      CRANIECTOMY Right 12/20/2023    Procedure: CRANIECTOMY;  Surgeon: Artem Can MD;  Location: [facility] OR;  Service: Neurosurgery;  Laterality: Right;    excision of colon      TRACHEOSTOMY N/A 12/29/2023    Procedure: CREATION, TRACHEOSTOMY;  Surgeon: Patricia Winslow MD;  Location: [facility] OR;  Service: ENT;  Laterality: N/A;  REQ 1130 //  NEEDS 2 SCRUBS       Social History     Socioeconomic History    Marital status:     Number of children: 9   Occupational History    Occupation: retired   Tobacco Use    Smoking status: Former    Smokeless tobacco: Never   Substance and Sexual Activity    Alcohol use: Not Currently    Drug use: Not Currently    Sexual activity: Not Currently     Partners: Female     Social Determinants of Health     Financial Resource Strain: Low Risk  (10/19/2022)    Overall Financial Resource Strain (CARDIA)     Difficulty of Paying Living Expenses: Not hard at all   Food Insecurity: No Food Insecurity (10/19/2022)    Hunger Vital Sign     Worried About Running Out of Food in the Last Year: Never true     Ran Out of Food in the Last Year: Never true   Transportation Needs: No Transportation Needs (10/19/2022)    PRAPARE - Transportation     Lack of Transportation (Medical): No     Lack of Transportation (Non-Medical): No   Physical Activity: Sufficiently Active (10/19/2022)    Exercise Vital Sign     Days of Exercise per Week: 6 days     Minutes of Exercise per Session: 60 min   Stress: No Stress Concern Present (10/19/2022)    [employer] - Occupational Stress Questionnaire     Feeling of Stress : Not at all   Social Connections: Unknown (10/19/2022)    Social Connection and Isolation Panel [NHANES]     Frequency of Communication with Friends and Family: More than three times a week     Frequency of Social Gatherings with Friends and Family: More than three  times a week     Attends Church Services: More than 4 times per year     Active Member of Clubs or Organizations: No     Attends Club or Organization Meetings: Never   Housing Stability: Low Risk  (10/19/2022)    Housing Stability Vital Sign     Unable to Pay for Housing in the Last Year: No     Number of Places Lived in the Last Year: 1     Unstable Housing in the Last Year: No     Current Outpatient Medications   Medication Instructions    albuterol (PROVENTIL/VENTOLIN HFA) 90 mcg/actuation inhaler 2 puffs, Inhalation, Every 6 hours PRN, Rescue    aspirin 81 MG Chew chew and swallow 1 tablet by mouth daily    atorvastatin (LIPITOR) 40 mg, Oral, Daily    cetirizine (ZYRTEC) 10 mg, Oral, Daily    diltiaZEM (CARDIZEM CD) 360 mg, Oral, Daily    losartan (COZAAR) 50 mg, Oral, Daily    metoprolol succinate (TOPROL-XL) 200 mg, Oral, 2 times daily    omeprazole (PRILOSEC) 20 mg, Oral, Daily, One tab by mouth daily    potassium chloride (KLOR-CON) 20 mEq Pack 20 mEq, Oral, Daily    spironolactone (ALDACTONE) 50 mg, Oral, Daily    torsemide (DEMADEX) 10 mg, Oral, Daily    vitamin D (VITAMIN D3) 1,000 Units, Oral, Daily    XARELTO 20 mg Tab TAKE 1 TABLET BY MOUTH DAILY with SUPPER     Current Inpatient Medications   aspirin  81 mg Per OG tube Daily    atorvastatin  10 mg Per NG tube Daily    carvediloL  3.125 mg Oral BID    diltiaZEM  240 mg Per OG tube Daily    docusate  100 mg Oral BID    enoxparin  1 mg/kg Subcutaneous Q12H (treatment, non-standard time)    famotidine (PF)  20 mg Intravenous Daily    levETIRAcetam (Keppra) IV (PEDS and ADULTS)  1,000 mg Intravenous Q12H    losartan  50 mg Per OG tube Daily    mannitol 20%  75 g Intravenous Once    polyethylene glycol  17 g Per NG tube TID    sennosides 8.8 mg/5 ml  5 mL Oral BID     Current Intravenous Infusions   dexmedeTOMIDine (Precedex) infusion (titrating) 1 mcg/kg/hr (01/05/24 0409)    fentanyl Stopped (12/31/23 0634)    NORepinephrine bitartrate-D5W Stopped  (12/20/23 1929)       Review of Systems   Unable to perform ROS: Critical illness   All other systems reviewed and are negative.     Objective:       Intake/Output Summary (Last 24 hours) at 1/5/2024 0529  Last data filed at 1/5/2024 0400  Gross per 24 hour   Intake 1729.87 ml   Output 1210 ml   Net 519.87 ml     Vital Signs (Most Recent):  Temp: 98.6 °F (37 °C) (01/05/24 0400)  Pulse: 75 (01/05/24 0500)  Resp: 18 (01/05/24 0500)  BP: (!) 130/98 (01/05/24 0500)  SpO2: 95 % (01/05/24 0500)  Body mass index is 36.88 kg/m².  Weight: 119.9 kg (264 lb 5.3 oz) Vital Signs (24h Range):  Temp:  [98.3 °F (36.8 °C)-98.8 °F (37.1 °C)] 98.6 °F (37 °C)  Pulse:  [] 75  Resp:  [14-27] 18  SpO2:  [92 %-100 %] 95 %  BP: ()/() 130/98     Physical Exam  General: No acute distress. Sleeping at this timee.  HEENT: Normocephalic, atraumatic. Face symmetric.    Cardiovascular: Regular rate & rhythm  Pulmonary: Bilateral symmetric chest rise, patient mechanically ventilated via trach  Abdominal:  non-distended, soft, round, positive bowel sounds  Extremities: No clubbing or cyanosis.  1+ pitting edema distal LUE  Neuro:   trached on th vent; sedated on precedex.  Patient has non purposeful movements of RUE, does not follow commands.      Lines/Drains/Airways       Drain  Duration                  Urethral Catheter 12/20/23 1007 15 days         Gastrostomy/Enterostomy 01/02/24 1230 LUQ 2 days              Airway  Duration             Adult Surgical Airway 12/29/23 1229 6 days              Peripheral Intravenous Line  Duration                  Peripheral IV - Single Lumen 12/25/23 1705 20 G Anterior;Left;Proximal Forearm 10 days         Midline Catheter Insertion/Assessment  - Single Lumen 12/29/23 1400 Right cephalic vein (lateral side of arm) 6 days                    Significant Labs:    Lab Results   Component Value Date    WBC 8.42 01/03/2024    HGB 10.6 (L) 01/03/2024    HCT 33.5 (L) 01/03/2024    MCV 91.8 01/03/2024      01/03/2024       BMP  Lab Results   Component Value Date     (H) 01/04/2024    K 3.7 01/04/2024    CHLORIDE 110 (H) 01/04/2024    CO2 29 01/04/2024    BUN 19.1 01/04/2024    CREATININE 0.73 01/04/2024    CALCIUM 8.0 (L) 01/04/2024    AGAP 7.0 01/04/2024    EGFRNONAA 61 04/23/2022     ABG  Recent Labs   Lab 12/31/23 0806   PH 7.450   PO2 89.0   PCO2 40.0   HCO3 27.8*   POCBASEDEF 3.50*     Mechanical Ventilation Support:  Vent Mode: A/C (01/05/24 0404)  Set Rate: 20 BPM (01/05/24 0404)  Vt Set: 460 mL (01/05/24 0404)  Pressure Support: 10 cmH20 (01/04/24 2337)  PEEP/CPAP: 5 cmH20 (01/05/24 0404)  Oxygen Concentration (%): 40 (01/05/24 0404)  Peak Airway Pressure: 30 cmH20 (01/05/24 0404)  Total Ve: 8.2 L/m (01/05/24 0404)  F/VT Ratio<105 (RSBI): (!) 49.02 (01/05/24 0404)      Significant Imaging:  No imaging this AM      Assessment/Plan:     Assessment  CVA s/p right ICA and MCA M3  branch thrombectomy s/p craniectomy with hemorrhagic conversion  Superficial thrombosis in left cephalic vein  Confirmed by DVT U/S on 12/26/2023  Acute hypoxic respiratory failure requiring intubation  Atrial fibrillation w/ RVR-rate now controlled.  Onychomycosis  HX of CAD, HTN, HLD, ADA  Hypernatremia  Urine osmolality indicative of extrarenal process, likely 2/2 NG tube as well as lack of intake      Plan  - free water flushes to PEG Currently at 210 ml q 4 hours. Initiate TF as tolerated.   -will try efforts in mechanical ventilation wean with trach  -continue to wean sedation as tolerated  -continue Miralax TID and Senna BID until BM achievement  -Appreciate the input of all consultants  -Remains on full-dose Lovenox for left cephalic vein thrombosis  -Will need to discuss placement - LTAC vs nursing home. Will follow up with PAGE Barlow, MD  Pulmonary Critical Care Medicine  [facility] - [facility] ICU  DOS: 01/05/2024

## 2024-01-05 NOTE — PROGRESS NOTES
Inpatient Nutrition Assessment    Admit Date: 12/19/2023   Total duration of encounter: 17 days   Patient Age: 68 y.o.    Nutrition Recommendation/Prescription     Tube feeding recommendation:    Peptamen Intense VHP goal rate 75 ml/hr to provide  1500 kcal/d (97% est needs)  139 g protein/d (89% est needs)  1260 ml free water/d   (calculations based on estimated 20 hr/d run time)     With free water flushes of 210 q4hr, total water: 2520kcal (92% est needs)    Communication of Recommendations: reviewed with nurse    Nutrition Assessment     Malnutrition Assessment/Nutrition-Focused Physical Exam    Malnutrition Context: acute illness or injury (12/21/23 1406)  Malnutrition Level:  (does not meet criteria) (12/21/23 1406)  Energy Intake (Malnutrition):  (unable to eval) (12/21/23 1406)  Weight Loss (Malnutrition):  (unable to eval) (12/21/23 1406)  Subcutaneous Fat (Malnutrition):  (does not meet criteria) (12/21/23 1406)           Muscle Mass (Malnutrition):  (does not meet criteria) (12/21/23 1406)                          Fluid Accumulation (Malnutrition):  (does not meet criteria) (12/21/23 1406)        A minimum of two characteristics is recommended for diagnosis of either severe or non-severe malnutrition.    Chart Review    Reason Seen: continuous nutrition monitoring and follow-up    Malnutrition Screening Tool Results   Have you recently lost weight without trying?: No  Have you been eating poorly because of a decreased appetite?: No   MST Score: 0   Diagnosis:  CVA s/p right ICA and MCA M3  branch thrombectomy s/p craniectomy with hemorrhagic conversion  Acute hypoxic respiratory failure  Atrial fibrillation    Relevant Medical History: Afib, HTN, CAD, CAD (STEMI 2003), HFpEF     Scheduled Medications:  [START ON 1/6/2024] aspirin, 81 mg, Daily  [START ON 1/6/2024] atorvastatin, 10 mg, Daily  carvediloL, 3.125 mg, BID  [START ON 1/6/2024] diltiaZEM, 240 mg, Daily  docusate, 100 mg, BID  enoxparin, 1 mg/kg,  Q12H (treatment, non-standard time)  famotidine (PF), 20 mg, Daily  levETIRAcetam (Keppra) IV (PEDS and ADULTS), 1,000 mg, Q12H  [START ON 1/6/2024] losartan, 50 mg, Daily  mannitol 20%, 75 g, Once  polyethylene glycol, 17 g, TID  sennosides 8.8 mg/5 ml, 5 mL, BID    Continuous Infusions:  dexmedeTOMIDine (Precedex) infusion (titrating), Last Rate: 1 mcg/kg/hr (01/05/24 0643)  fentanyl, Last Rate: Stopped (12/31/23 0634)  NORepinephrine bitartrate-D5W, Last Rate: Stopped (12/20/23 1929)    PRN Medications: 0.9%  NaCl infusion (for blood administration), acetaminophen, dextrose 10%, dextrose 10%, fentaNYL, glucagon (human recombinant), hydrALAZINE, insulin aspart U-100, ipratropium, labetalol, levalbuterol, metoprolol, ondansetron, polyethylene glycol, sodium chloride 0.9%    Calorie Containing IV Medications: no significant kcals from medications at this time    Recent Labs   Lab 12/30/23  0229 12/31/23  0154 01/02/24  0104 01/03/24  0717 01/04/24  0125 01/05/24  0442   * 147* 149* 149* 146* 145   K 4.3 3.6 3.7 3.6 3.7 3.7   CALCIUM 7.8* 7.9* 7.9* 7.9* 8.0* 7.9*   PHOS 3.0 2.9 2.6 3.3  --   --    MG 2.20 2.10 2.20 2.20  --  2.00   CHLORIDE 114* 114* 114* 111* 110* 110*   CO2 23 23 26 31 29 29   BUN 23.5 22.1 19.6 16.5 19.1 21.2   CREATININE 0.84 0.87 0.78 0.81 0.73 0.76   EGFRNORACEVR >60 >60 >60 >60 >60 >60   GLUCOSE 130* 121* 137* 116* 124* 111   BILITOT 0.8 0.8 0.8 0.7  --  0.6   ALKPHOS 56 55 50 58  --  55   ALT 70* 62* 39 46  --  32   AST 66* 51* 35* 34  --  27   ALBUMIN 2.1* 2.2* 2.1* 2.2*  --  2.2*   WBC 12.44* 11.16 8.75 8.42  --  6.99   HGB 10.7* 10.8* 10.4* 10.6*  --  10.7*   HCT 33.7* 33.5* 33.4* 33.5*  --  33.9*        Nutrition Orders:  Diet NPO      Appetite/Oral Intake: NPO/not applicable  Factors Affecting Nutritional Intake: on mechanical ventilation and tracheostomy  Food/Hindu/Cultural Preferences: unable to obtain  Food Allergies: none reported  Last Bowel Movement: 01/04/24  Wound(s):   "incision noted    Comments    12/21/23: Discussed with RN. Will provide tube feeding recommendations for when appropriate to start tube feeding. Receiving kcal from meds.      12/22/23: Patient remains intubated, receiving kcal from meds. Cleviprex d/c'ed this morning, Diprivan continues. Patient currently with OG tube to LIS.     12/23/23: Pt with significant output via NG tube. Propofol infusion increased and Precedex started. Will leave TPN recommendations if Tube feeds are unable to be initiated by tomorrow.     12/26/23: TF continues, tolerated per RN. Noted no longer receiving kcal from meds. Will update goal rate to continue to meet est needs.     12/29/23: TF on hold for trach placement today. Pt recently returned, NG to be placed per RN. Once pt weaned off vent, will update TF and est needs.     1/2/24: TF on hold for PEG placement today. Plans to restart TF. Will update FWF to more closely meet est fluid needs.     1/5/24: TF continues @ goal rate. No kcal from meds.     Anthropometrics    Height: 5' 10.98" (180.3 cm),    Last Weight: 119.9 kg (264 lb 5.3 oz) (12/29/23 0541), Weight Method: Bed Scale  BMI (Calculated): 36.9  BMI Classification: obese grade I (BMI 30-34.9)        Ideal Body Weight (IBW), Male: 171.88 lb     % Ideal Body Weight, Male (lb): 140.99 %                          Usual Weight Provided By: unable to obtain usual weight    Wt Readings from Last 5 Encounters:   12/29/23 119.9 kg (264 lb 5.3 oz)   12/11/23 112.7 kg (248 lb 7.3 oz)   12/05/23 112.3 kg (247 lb 9.6 oz)   11/07/23 109.5 kg (241 lb 6.5 oz)   10/30/23 113.9 kg (251 lb 1.7 oz)     Weight Change(s) Since Admission:   Wt Readings from Last 1 Encounters:   12/29/23 0541 119.9 kg (264 lb 5.3 oz)   12/28/23 0600 116.9 kg (257 lb 11.5 oz)   12/27/23 0600 116.9 kg (257 lb 11.5 oz)   12/26/23 0600 116.1 kg (255 lb 15.3 oz)   12/24/23 0544 111.1 kg (244 lb 14.9 oz)   12/19/23 1645 110 kg (242 lb 8.1 oz)   12/19/23 1053 110 kg (242 lb " 8.1 oz)   Admit Weight: 110 kg (242 lb 8.1 oz) (12/19/23 1053), Weight Method: Bed Scale    Estimated Needs    Weight Used For Calorie Calculations: 110 kg (242 lb 8.1 oz)  Energy Calorie Requirements (kcal): 1210-1540kcal (11-14kcal/kg)  Energy Need Method: Kcal/kg  Weight Used For Protein Calculations: 78.2 kg (172 lb 6.4 oz) (IBW)  Protein Requirements: 157gm (2g/kg IBW)  Fluid Requirements (mL): 2750mL (25mL/kg CBW) or per MD    Enteral Nutrition     Formula: Peptamen Intense VHP  Rate/Volume: 75ml/hr  Water Flushes: 210ml q4hr  Additives/Modulars: none at this time  Route: nasogastric tube  Method: continuous  Total Nutrition Provided by Tube Feeding, Additives, and Flushes:  Calories Provided  1500 kcal/d, 97% needs   Protein Provided  139 g/d, 89% needs   Fluid Provided  2520 ml/d, 92% needs   Continuous feeding calculations based on estimated 20 hr/d run time unless otherwise stated.    Parenteral Nutrition     Patient not receiving parenteral nutrition support at this time.    Evaluation of Received Nutrient Intake    Calories: meeting estimated needs  Protein: meeting estimated needs    Patient Education     Not applicable.    Nutrition Diagnosis     PES: Inadequate oral intake related to acute illness as evidenced by intubation/trach since 12/19/23. (active)     Nutrition Interventions     Intervention(s): collaboration with other providers    Goal: Meet greater than 80% of nutritional needs by follow-up. (goal progressing)  Goal: Tolerate enteral feeding at goal rate by follow-up. (goal progressing)    Nutrition Goals & Monitoring     Dietitian will monitor: energy intake    Nutrition Risk/Follow-Up: high (follow-up in 1-4 days)   Please consult if re-assessment needed sooner.

## 2024-01-06 LAB
ALBUMIN SERPL-MCNC: 2.1 G/DL (ref 3.4–4.8)
ALBUMIN/GLOB SERPL: 0.5 RATIO (ref 1.1–2)
ALP SERPL-CCNC: 55 UNIT/L (ref 40–150)
ALT SERPL-CCNC: 33 UNIT/L (ref 0–55)
AST SERPL-CCNC: 31 UNIT/L (ref 5–34)
BASOPHILS # BLD AUTO: 0.03 X10(3)/MCL
BASOPHILS NFR BLD AUTO: 0.4 %
BILIRUB SERPL-MCNC: 0.5 MG/DL
BUN SERPL-MCNC: 20.2 MG/DL (ref 8.4–25.7)
CALCIUM SERPL-MCNC: 8.2 MG/DL (ref 8.8–10)
CHLORIDE SERPL-SCNC: 108 MMOL/L (ref 98–107)
CO2 SERPL-SCNC: 28 MMOL/L (ref 23–31)
CREAT SERPL-MCNC: 0.82 MG/DL (ref 0.73–1.18)
EOSINOPHIL # BLD AUTO: 0.45 X10(3)/MCL (ref 0–0.9)
EOSINOPHIL NFR BLD AUTO: 6.4 %
ERYTHROCYTE [DISTWIDTH] IN BLOOD BY AUTOMATED COUNT: 14.6 % (ref 11.5–17)
GFR SERPLBLD CREATININE-BSD FMLA CKD-EPI: >60 MLS/MIN/1.73/M2
GLOBULIN SER-MCNC: 4 GM/DL (ref 2.4–3.5)
GLUCOSE SERPL-MCNC: 125 MG/DL (ref 82–115)
HCT VFR BLD AUTO: 35.2 % (ref 42–52)
HGB BLD-MCNC: 11.2 G/DL (ref 14–18)
IMM GRANULOCYTES # BLD AUTO: 0.16 X10(3)/MCL (ref 0–0.04)
IMM GRANULOCYTES NFR BLD AUTO: 2.3 %
LYMPHOCYTES # BLD AUTO: 1.88 X10(3)/MCL (ref 0.6–4.6)
LYMPHOCYTES NFR BLD AUTO: 26.7 %
MAGNESIUM SERPL-MCNC: 2.1 MG/DL (ref 1.6–2.6)
MCH RBC QN AUTO: 28.4 PG (ref 27–31)
MCHC RBC AUTO-ENTMCNC: 31.8 G/DL (ref 33–36)
MCV RBC AUTO: 89.3 FL (ref 80–94)
MONOCYTES # BLD AUTO: 0.73 X10(3)/MCL (ref 0.1–1.3)
MONOCYTES NFR BLD AUTO: 10.4 %
NEUTROPHILS # BLD AUTO: 3.8 X10(3)/MCL (ref 2.1–9.2)
NEUTROPHILS NFR BLD AUTO: 53.8 %
NRBC BLD AUTO-RTO: 0.3 %
PLATELET # BLD AUTO: 334 X10(3)/MCL (ref 130–400)
PMV BLD AUTO: 11.8 FL (ref 7.4–10.4)
POTASSIUM SERPL-SCNC: 3.6 MMOL/L (ref 3.5–5.1)
PROT SERPL-MCNC: 6.1 GM/DL (ref 5.8–7.6)
RBC # BLD AUTO: 3.94 X10(6)/MCL (ref 4.7–6.1)
SODIUM SERPL-SCNC: 143 MMOL/L (ref 136–145)
WBC # SPEC AUTO: 7.05 X10(3)/MCL (ref 4.5–11.5)

## 2024-01-06 PROCEDURE — 25000003 PHARM REV CODE 250: Performed by: INTERNAL MEDICINE

## 2024-01-06 PROCEDURE — 99900035 HC TECH TIME PER 15 MIN (STAT)

## 2024-01-06 PROCEDURE — 27200966 HC CLOSED SUCTION SYSTEM

## 2024-01-06 PROCEDURE — 85025 COMPLETE CBC W/AUTO DIFF WBC: CPT

## 2024-01-06 PROCEDURE — 80053 COMPREHEN METABOLIC PANEL: CPT

## 2024-01-06 PROCEDURE — 94761 N-INVAS EAR/PLS OXIMETRY MLT: CPT

## 2024-01-06 PROCEDURE — 99900026 HC AIRWAY MAINTENANCE (STAT)

## 2024-01-06 PROCEDURE — 20000000 HC ICU ROOM

## 2024-01-06 PROCEDURE — 83735 ASSAY OF MAGNESIUM: CPT

## 2024-01-06 PROCEDURE — 94003 VENT MGMT INPAT SUBQ DAY: CPT

## 2024-01-06 PROCEDURE — 63600175 PHARM REV CODE 636 W HCPCS: Performed by: STUDENT IN AN ORGANIZED HEALTH CARE EDUCATION/TRAINING PROGRAM

## 2024-01-06 PROCEDURE — 63600175 PHARM REV CODE 636 W HCPCS: Performed by: INTERNAL MEDICINE

## 2024-01-06 PROCEDURE — 27100171 HC OXYGEN HIGH FLOW UP TO 24 HOURS

## 2024-01-06 PROCEDURE — 63600175 PHARM REV CODE 636 W HCPCS

## 2024-01-06 PROCEDURE — 25000003 PHARM REV CODE 250: Performed by: STUDENT IN AN ORGANIZED HEALTH CARE EDUCATION/TRAINING PROGRAM

## 2024-01-06 RX ORDER — METHYL SALICYLATE
LIQUID (ML) TOPICAL
Status: DISCONTINUED | OUTPATIENT
Start: 2024-01-06 | End: 2024-01-16 | Stop reason: HOSPADM

## 2024-01-06 RX ORDER — LEVETIRACETAM 100 MG/ML
1000 SOLUTION ORAL 2 TIMES DAILY
Status: DISCONTINUED | OUTPATIENT
Start: 2024-01-06 | End: 2024-01-16 | Stop reason: HOSPADM

## 2024-01-06 RX ORDER — QUETIAPINE FUMARATE 25 MG/1
25 TABLET, FILM COATED ORAL 2 TIMES DAILY
Status: DISCONTINUED | OUTPATIENT
Start: 2024-01-06 | End: 2024-01-16 | Stop reason: HOSPADM

## 2024-01-06 RX ADMIN — DEXMEDETOMIDINE HYDROCHLORIDE 1 MCG/KG/HR: 4 INJECTION INTRAVENOUS at 02:01

## 2024-01-06 RX ADMIN — ATORVASTATIN CALCIUM 10 MG: 10 TABLET, FILM COATED ORAL at 08:01

## 2024-01-06 RX ADMIN — LOSARTAN POTASSIUM 50 MG: 50 TABLET, FILM COATED ORAL at 08:01

## 2024-01-06 RX ADMIN — METHYL SALICYLATE: 980 LIQUID TOPICAL at 08:01

## 2024-01-06 RX ADMIN — QUETIAPINE FUMARATE 25 MG: 25 TABLET ORAL at 12:01

## 2024-01-06 RX ADMIN — HYDRALAZINE HYDROCHLORIDE 10 MG: 20 INJECTION INTRAMUSCULAR; INTRAVENOUS at 06:01

## 2024-01-06 RX ADMIN — FAMOTIDINE 20 MG: 10 INJECTION, SOLUTION INTRAVENOUS at 08:01

## 2024-01-06 RX ADMIN — ENOXAPARIN SODIUM 120 MG: 150 INJECTION SUBCUTANEOUS at 09:01

## 2024-01-06 RX ADMIN — CARVEDILOL 3.12 MG: 3.12 TABLET, FILM COATED ORAL at 09:01

## 2024-01-06 RX ADMIN — DEXMEDETOMIDINE HYDROCHLORIDE 0.6 MCG/KG/HR: 4 INJECTION INTRAVENOUS at 03:01

## 2024-01-06 RX ADMIN — LEVETIRACETAM INJECTION 1000 MG: 10 INJECTION INTRAVENOUS at 08:01

## 2024-01-06 RX ADMIN — ASPIRIN 81 MG CHEWABLE TABLET 81 MG: 81 TABLET CHEWABLE at 08:01

## 2024-01-06 RX ADMIN — DEXMEDETOMIDINE HYDROCHLORIDE 1 MCG/KG/HR: 4 INJECTION INTRAVENOUS at 12:01

## 2024-01-06 RX ADMIN — ACETAMINOPHEN 650 MG: 650 SOLUTION ORAL at 12:01

## 2024-01-06 RX ADMIN — DEXMEDETOMIDINE HYDROCHLORIDE 0.5 MCG/KG/HR: 4 INJECTION INTRAVENOUS at 10:01

## 2024-01-06 RX ADMIN — LEVETIRACETAM 1000 MG: 100 SOLUTION ORAL at 09:01

## 2024-01-06 RX ADMIN — ENOXAPARIN SODIUM 120 MG: 150 INJECTION SUBCUTANEOUS at 12:01

## 2024-01-06 RX ADMIN — DOCUSATE SODIUM 100 MG: 50 LIQUID ORAL at 09:01

## 2024-01-06 RX ADMIN — DILTIAZEM HYDROCHLORIDE 240 MG: 60 TABLET, FILM COATED ORAL at 08:01

## 2024-01-06 RX ADMIN — DEXMEDETOMIDINE HYDROCHLORIDE 1.4 MCG/KG/HR: 4 INJECTION INTRAVENOUS at 08:01

## 2024-01-06 RX ADMIN — DEXMEDETOMIDINE HYDROCHLORIDE 1 MCG/KG/HR: 4 INJECTION INTRAVENOUS at 05:01

## 2024-01-06 RX ADMIN — QUETIAPINE FUMARATE 25 MG: 25 TABLET ORAL at 09:01

## 2024-01-06 RX ADMIN — CARVEDILOL 3.12 MG: 3.12 TABLET, FILM COATED ORAL at 08:01

## 2024-01-06 NOTE — PLAN OF CARE
Problem: Adult Inpatient Plan of Care  Goal: Plan of Care Review  1/6/2024 1605 by Maira Roe RN  Outcome: Ongoing, Progressing  1/6/2024 1605 by Maira Roe RN  Outcome: Ongoing, Not Progressing  Goal: Patient-Specific Goal (Individualized)  1/6/2024 1605 by Maira Roe RN  Outcome: Ongoing, Progressing  1/6/2024 1605 by Maira Roe RN  Outcome: Ongoing, Not Progressing  Goal: Absence of Hospital-Acquired Illness or Injury  1/6/2024 1605 by Maira Roe RN  Outcome: Ongoing, Progressing  1/6/2024 1605 by Maira Roe RN  Outcome: Ongoing, Not Progressing  Goal: Optimal Comfort and Wellbeing  1/6/2024 1605 by Maira Roe RN  Outcome: Ongoing, Progressing  1/6/2024 1605 by Maira Roe RN  Outcome: Ongoing, Not Progressing  Goal: Readiness for Transition of Care  1/6/2024 1605 by Maira Roe RN  Outcome: Ongoing, Progressing  1/6/2024 1605 by Maira Roe RN  Outcome: Ongoing, Not Progressing     Problem: Skin Injury Risk Increased  Goal: Skin Health and Integrity  1/6/2024 1605 by Maira Roe RN  Outcome: Ongoing, Progressing  1/6/2024 1605 by Maira Roe RN  Outcome: Ongoing, Not Progressing     Problem: Adjustment to Illness (Stroke, Ischemic/Transient Ischemic Attack)  Goal: Optimal Coping  1/6/2024 1605 by Maira Roe RN  Outcome: Ongoing, Progressing  1/6/2024 1605 by Maira Roe RN  Outcome: Ongoing, Not Progressing     Problem: Bowel Elimination Impaired (Stroke, Ischemic/Transient Ischemic Attack)  Goal: Effective Bowel Elimination  1/6/2024 1605 by Maira Roe RN  Outcome: Ongoing, Progressing  1/6/2024 1605 by Maira Roe RN  Outcome: Ongoing, Not Progressing     Problem: Cerebral Tissue Perfusion (Stroke, Ischemic/Transient Ischemic Attack)  Goal: Optimal Cerebral Tissue Perfusion  1/6/2024 1605 by Maira Roe RN  Outcome: Ongoing, Progressing  1/6/20242024 1605 by Maira Roe, RN  Outcome: Ongoing, Not  Progressing     Problem: Cognitive Impairment (Stroke, Ischemic/Transient Ischemic Attack)  Goal: Optimal Cognitive Function  1/6/2024 1605 by Maira Roe RN  Outcome: Ongoing, Progressing  1/6/2024 1605 by Maira Roe RN  Outcome: Ongoing, Not Progressing     Problem: Communication Impairment (Stroke, Ischemic/Transient Ischemic Attack)  Goal: Improved Communication Skills  1/6/2024 1605 by Maira Roe RN  Outcome: Ongoing, Progressing  1/6/2024 1605 by Maira Roe RN  Outcome: Ongoing, Not Progressing     Problem: Functional Ability Impaired (Stroke, Ischemic/Transient Ischemic Attack)  Goal: Optimal Functional Ability  1/6/2024 1605 by Maira Roe RN  Outcome: Ongoing, Progressing  1/6/2024 1605 by Maira Roe RN  Outcome: Ongoing, Not Progressing     Problem: Respiratory Compromise (Stroke, Ischemic/Transient Ischemic Attack)  Goal: Effective Oxygenation and Ventilation  1/6/2024 1605 by Maira Roe RN  Outcome: Ongoing, Progressing  1/6/2024 1605 by Maira Roe RN  Outcome: Ongoing, Not Progressing     Problem: Sensorimotor Impairment (Stroke, Ischemic/Transient Ischemic Attack)  Goal: Improved Sensorimotor Function  1/6/2024 1605 by Maira Roe RN  Outcome: Ongoing, Progressing  1/6/2024 1605 by Maira Roe RN  Outcome: Ongoing, Not Progressing     Problem: Swallowing Impairment (Stroke, Ischemic/Transient Ischemic Attack)  Goal: Optimal Eating and Swallowing without Aspiration  1/6/2024 1605 by Maira Roe RN  Outcome: Ongoing, Progressing  1/6/2024 1605 by Maira Roe RN  Outcome: Ongoing, Not Progressing     Problem: Urinary Elimination Impaired (Stroke, Ischemic/Transient Ischemic Attack)  Goal: Effective Urinary Elimination  1/6/2024 1605 by Maira Roe RN  Outcome: Ongoing, Progressing  1/6/2024 1605 by Maira Roe RN  Outcome: Ongoing, Not Progressing     Problem: Fall Injury Risk  Goal: Absence of Fall and Fall-Related  Injury  1/6/2024 1605 by Maira Roe RN  Outcome: Ongoing, Progressing  1/6/2024 1605 by Maira Roe RN  Outcome: Ongoing, Not Progressing     Problem: Infection  Goal: Absence of Infection Signs and Symptoms  1/6/2024 1605 by Maira Roe RN  Outcome: Ongoing, Progressing  1/6/2024 1605 by Maira Roe RN  Outcome: Ongoing, Not Progressing     Problem: Impaired Wound Healing  Goal: Optimal Wound Healing  1/6/2024 1605 by Maira Roe RN  Outcome: Ongoing, Progressing  1/6/2024 1605 by Maira Roe RN  Outcome: Ongoing, Not Progressing

## 2024-01-06 NOTE — PROGRESS NOTES
[facility] - [facility]  Pulmonary Critical Care Note    Patient Name: Delio Daniel Jr.  MRN: [id number]  Admission Date: 12/19/2023  Hospital Length of Stay: 18 days  Code Status: Full Code  Attending Provider: KODI Drake MD  Primary Care Provider: Candy, Primary Doctor     Subjective:     HPI:   Delio Daniel Jr. is a 68 y.o. 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 His last known normal was 9:15 per EMS. Stroke protocol in ED initiated. Unofficial CT head showed possible dense R MCA. Pt taken to cath lab for BRAD thrombectomy. Admitted to ICU for post-operative care and monitoring.     Hospital Course/Significant events:  12/19/2023 - Admitted to ICU s/p right internal carotid artery thrombectomy  12/20/2023 - 12/20/2023 he had a rapid deterioration in his neurologic status with CT showing, as expected, a large right MCA distribution infarct with marked right-to-left shift. s/p craniectomy  12/29/23 - s/p tracheotomy  1/2/2024- PEG tube placed     24 Hour Interval History:  No acute events over night. Hypertensive with -170s otherwise vital signs remained stable and patient is afebrile. Remains on ventilation via tracheostomy and sedated on precedex. Vent settings currently on AC mode RR20/PEEP5/FiO2%30. He does not follow commands. Urine output of 1.4L but still has not had BM. Currently pending placement LTAC vs nursing home, CM working with family.      Past Medical History:   Diagnosis Date    Arthritis     Atrial fibrillation     BPH (benign prostatic hyperplasia)     Cardiac arrest     Coronary artery disease     Cyst,  kidney, acquired     Diverticulosis     Hyperlipidemia     Hypertension     MI (myocardial infarction)     Obesity     Steatosis of liver      Past Surgical History:   Procedure Laterality Date    A-V CARDIAC PACEMAKER INSERTION Right     CARDIAC CATHETERIZATION      COLONOSCOPY W/ BIOPSIES      CRANIECTOMY Right 12/20/2023    Procedure: CRANIECTOMY;  Surgeon: Artem Can MD;  Location: [facility] OR;  Service: Neurosurgery;  Laterality: Right;    ESOPHAGOGASTRODUODENOSCOPY W/ PEG N/A 1/2/2024    Procedure: PEG;  Surgeon: Tani Day MD;  Location: [facility] ENDOSCOPY;  Service: Gastroenterology;  Laterality: N/A;    excision of colon      TRACHEOSTOMY N/A 12/29/2023    Procedure: CREATION, TRACHEOSTOMY;  Surgeon: Patricia Winslow MD;  Location: [facility] OR;  Service: ENT;  Laterality: N/A;  REQ 1130 //  NEEDS 2 SCRUBS       Social History     Socioeconomic History    Marital status:     Number of children: 9   Occupational History    Occupation: retired   Tobacco Use    Smoking status: Former    Smokeless tobacco: Never   Substance and Sexual Activity    Alcohol use: Not Currently    Drug use: Not Currently    Sexual activity: Not Currently     Partners: Female     Social Determinants of Health     Financial Resource Strain: Low Risk  (10/19/2022)    Overall Financial Resource Strain (CARDIA)     Difficulty of Paying Living Expenses: Not hard at all   Food Insecurity: No Food Insecurity (10/19/2022)    Hunger Vital Sign     Worried About Running Out of Food in the Last Year: Never true     Ran Out of Food in the Last Year: Never true   Transportation Needs: No Transportation Needs (10/19/2022)    PRAPARE - Transportation     Lack of Transportation (Medical): No     Lack of Transportation (Non-Medical): No   Physical Activity: Sufficiently Active (10/19/2022)    Exercise Vital Sign     Days of Exercise per Week: 6 days     Minutes of Exercise per Session: 60 min   Stress: No Stress Concern Present  (10/19/2022)    [employer] [place] [employer] - Occupational Stress Questionnaire     Feeling of Stress : Not at all   Social Connections: Unknown (10/19/2022)    Social Connection and Isolation Panel [NHANES]     Frequency of Communication with Friends and Family: More than three times a week     Frequency of Social Gatherings with Friends and Family: More than three times a week     Attends Sabianist Services: More than 4 times per year     Active Member of Clubs or Organizations: No     Attends Club or Organization Meetings: Never   Housing Stability: Low Risk  (10/19/2022)    Housing Stability Vital Sign     Unable to Pay for Housing in the Last Year: No     Number of Places Lived in the Last Year: 1     Unstable Housing in the Last Year: No     Current Outpatient Medications   Medication Instructions    albuterol (PROVENTIL/VENTOLIN HFA) 90 mcg/actuation inhaler 2 puffs, Inhalation, Every 6 hours PRN, Rescue    aspirin 81 MG Chew chew and swallow 1 tablet by mouth daily    atorvastatin (LIPITOR) 40 mg, Oral, Daily    cetirizine (ZYRTEC) 10 mg, Oral, Daily    diltiaZEM (CARDIZEM CD) 360 mg, Oral, Daily    losartan (COZAAR) 50 mg, Oral, Daily    metoprolol succinate (TOPROL-XL) 200 mg, Oral, 2 times daily    omeprazole (PRILOSEC) 20 mg, Oral, Daily, One tab by mouth daily    potassium chloride (KLOR-CON) 20 mEq Pack 20 mEq, Oral, Daily    spironolactone (ALDACTONE) 50 mg, Oral, Daily    torsemide (DEMADEX) 10 mg, Oral, Daily    vitamin D (VITAMIN D3) 1,000 Units, Oral, Daily    XARELTO 20 mg Tab TAKE 1 TABLET BY MOUTH DAILY with SUPPER     Current Inpatient Medications   aspirin  81 mg Per G Tube Daily    atorvastatin  10 mg Per G Tube Daily    carvediloL  3.125 mg Per G Tube BID    diltiaZEM  240 mg Per G Tube Daily    docusate  100 mg Per G Tube BID    enoxparin  1 mg/kg Subcutaneous Q12H (treatment, non-standard time)    famotidine (PF)  20 mg Intravenous Daily    levETIRAcetam (Keppra) IV (PEDS and  ADULTS)  1,000 mg Intravenous Q12H    losartan  50 mg Per G Tube Daily    mannitol 20%  75 g Intravenous Once    polyethylene glycol  17 g Per G Tube TID    sennosides 8.8 mg/5 ml  5 mL Per G Tube BID     Current Intravenous Infusions   dexmedeTOMIDine (Precedex) infusion (titrating) 1.4 mcg/kg/hr (01/06/24 0831)    fentanyl Stopped (12/31/23 0634)    NORepinephrine bitartrate-D5W Stopped (12/20/23 1929)       Review of Systems   Unable to perform ROS: Critical illness   All other systems reviewed and are negative.     Objective:       Intake/Output Summary (Last 24 hours) at 1/6/2024 0919  Last data filed at 1/6/2024 0530  Gross per 24 hour   Intake 2730.31 ml   Output 1230 ml   Net 1500.31 ml       Vital Signs (Most Recent):  Temp: 99.3 °F (37.4 °C) (01/06/24 0400)  Pulse: 109 (01/06/24 0700)  Resp: 13 (01/06/24 0700)  BP: (!) 151/107 (01/06/24 0700)  SpO2: (!) 94 % (01/06/24 0700)  Body mass index is 36.88 kg/m².  Weight: 119.9 kg (264 lb 5.3 oz) Vital Signs (24h Range):  Temp:  [98.2 °F (36.8 °C)-99.3 °F (37.4 °C)] 99.3 °F (37.4 °C)  Pulse:  [] 109  Resp:  [0-24] 13  SpO2:  [93 %-100 %] 94 %  BP: (101-170)/() 151/107     Physical Exam  General: No acute distress. Sleeping at this timee.  HEENT: Normocephalic, atraumatic. Face symmetric.    Cardiovascular: Regular rate & rhythm  Pulmonary: Bilateral symmetric chest rise, patient mechanically ventilated via trach  Abdominal:  non-distended, soft, round, positive bowel sounds  Extremities: No clubbing or cyanosis.  1+ pitting edema distal LUE  Neuro:   trached on th vent; sedated on precedex.  Patient has non purposeful movements of RUE, does not follow commands.      Lines/Drains/Airways       Drain  Duration                  Urethral Catheter 12/20/23 1007 16 days         Gastrostomy/Enterostomy 01/02/24 1230 LUQ 3 days              Airway  Duration             Adult Surgical Airway 12/29/23 1229 7 days              Peripheral Intravenous Line   Duration                  Peripheral IV - Single Lumen 12/25/23 1705 20 G Anterior;Left;Proximal Forearm 11 days         Midline Catheter Insertion/Assessment  - Single Lumen 12/29/23 1400 Right cephalic vein (lateral side of arm) 7 days                    Significant Labs:    Lab Results   Component Value Date    WBC 7.05 01/06/2024    HGB 11.2 (L) 01/06/2024    HCT 35.2 (L) 01/06/2024    MCV 89.3 01/06/2024     01/06/2024       BMP  Lab Results   Component Value Date     01/06/2024    K 3.6 01/06/2024    CHLORIDE 108 (H) 01/06/2024    CO2 28 01/06/2024    BUN 20.2 01/06/2024    CREATININE 0.82 01/06/2024    CALCIUM 8.2 (L) 01/06/2024    AGAP 7.0 01/04/2024    EGFRNONAA 61 04/23/2022     ABG  Recent Labs   Lab 12/31/23 0806   PH 7.450   PO2 89.0   PCO2 40.0   HCO3 27.8*   POCBASEDEF 3.50*       Mechanical Ventilation Support:  Vent Mode: A/C (01/06/24 0901)  Set Rate: 20 BPM (01/06/24 0901)  Vt Set: 460 mL (01/06/24 0901)  Pressure Support: 10 cmH20 (01/04/24 2337)  PEEP/CPAP: 5 cmH20 (01/06/24 0901)  Oxygen Concentration (%): 30 (01/06/24 0901)  Peak Airway Pressure: 20 cmH20 (01/06/24 0901)  Total Ve: 10.3 L/m (01/06/24 0901)  F/VT Ratio<105 (RSBI): (!) 50.46 (01/06/24 0300)      Significant Imaging:  No imaging this AM      Assessment/Plan:     Assessment  CVA s/p right ICA and MCA M3  branch thrombectomy s/p craniectomy with hemorrhagic conversion  Superficial thrombosis in left cephalic vein  Confirmed by DVT U/S on 12/26/2023  Acute hypoxic respiratory failure requiring intubation  Atrial fibrillation w/ RVR-rate now controlled.  Onychomycosis  HX of CAD, HTN, HLD, ADA  Hypernatremia  Urine osmolality indicative of extrarenal process, likely 2/2 NG tube as well as lack of intake      Plan  - Free water flushes to PEG Currently at 210 ml q 4 hours. Initiate TF as tolerated.   -Continue efforts to wean sedation and mechanical ventilation wean with trach as tolerated  -Continue bowel regimen of  Miralax TID and Senna BID   -Neurosurgery signed off on 1/3/24   -Bone flap precautions and continue helmet use out of bed   -Seizure precautions and continue Keppra BID    -Will require outpatient referral from placement back to outpatient neurosurgery office for consideration of cranioplasty   -Gastroenterology signed off on 1/3/24   -Continue Tfs and PEG care   -Neurology signed off on 12/22/23   -SBP goal <160    -Continue ASA 81mg qd   -Cardiology signed off on 12/25/23   -Continue Diltiazem 240mg qd, Losartan and Metoprolol tartrate 100mg BID    -Lopressor IV PRN for HR>120   -Remains on full-dose Lovenox for left cephalic vein thrombosis  -Will need to discuss placement - LTAC vs nursing home. Will follow up with CM.     DVT ppx: FD Lovenox   GI ppx: Casey Kee MD   Pulmonary Critical Care Medicine  [facility] - [facility]  DOS: 01/06/2024

## 2024-01-07 LAB
ALBUMIN SERPL-MCNC: 2.2 G/DL (ref 3.4–4.8)
ALBUMIN/GLOB SERPL: 0.6 RATIO (ref 1.1–2)
ALP SERPL-CCNC: 50 UNIT/L (ref 40–150)
ALT SERPL-CCNC: 44 UNIT/L (ref 0–55)
AST SERPL-CCNC: 41 UNIT/L (ref 5–34)
BASOPHILS # BLD AUTO: 0.03 X10(3)/MCL
BASOPHILS NFR BLD AUTO: 0.4 %
BILIRUB SERPL-MCNC: 0.5 MG/DL
BUN SERPL-MCNC: 17.9 MG/DL (ref 8.4–25.7)
CALCIUM SERPL-MCNC: 8.3 MG/DL (ref 8.8–10)
CHLORIDE SERPL-SCNC: 106 MMOL/L (ref 98–107)
CO2 SERPL-SCNC: 29 MMOL/L (ref 23–31)
CREAT SERPL-MCNC: 0.79 MG/DL (ref 0.73–1.18)
EOSINOPHIL # BLD AUTO: 0.3 X10(3)/MCL (ref 0–0.9)
EOSINOPHIL NFR BLD AUTO: 3.9 %
ERYTHROCYTE [DISTWIDTH] IN BLOOD BY AUTOMATED COUNT: 14.7 % (ref 11.5–17)
GFR SERPLBLD CREATININE-BSD FMLA CKD-EPI: >60 MLS/MIN/1.73/M2
GLOBULIN SER-MCNC: 4 GM/DL (ref 2.4–3.5)
GLUCOSE SERPL-MCNC: 107 MG/DL (ref 82–115)
HCT VFR BLD AUTO: 34.3 % (ref 42–52)
HGB BLD-MCNC: 10.5 G/DL (ref 14–18)
IMM GRANULOCYTES # BLD AUTO: 0.13 X10(3)/MCL (ref 0–0.04)
IMM GRANULOCYTES NFR BLD AUTO: 1.7 %
LYMPHOCYTES # BLD AUTO: 1.83 X10(3)/MCL (ref 0.6–4.6)
LYMPHOCYTES NFR BLD AUTO: 23.7 %
MAGNESIUM SERPL-MCNC: 2.1 MG/DL (ref 1.6–2.6)
MCH RBC QN AUTO: 28.3 PG (ref 27–31)
MCHC RBC AUTO-ENTMCNC: 30.6 G/DL (ref 33–36)
MCV RBC AUTO: 92.5 FL (ref 80–94)
MONOCYTES # BLD AUTO: 0.6 X10(3)/MCL (ref 0.1–1.3)
MONOCYTES NFR BLD AUTO: 7.8 %
NEUTROPHILS # BLD AUTO: 4.84 X10(3)/MCL (ref 2.1–9.2)
NEUTROPHILS NFR BLD AUTO: 62.5 %
NRBC BLD AUTO-RTO: 0 %
PLATELET # BLD AUTO: 321 X10(3)/MCL (ref 130–400)
PMV BLD AUTO: 11.3 FL (ref 7.4–10.4)
POTASSIUM SERPL-SCNC: 3.7 MMOL/L (ref 3.5–5.1)
PROT SERPL-MCNC: 6.2 GM/DL (ref 5.8–7.6)
RBC # BLD AUTO: 3.71 X10(6)/MCL (ref 4.7–6.1)
SODIUM SERPL-SCNC: 141 MMOL/L (ref 136–145)
WBC # SPEC AUTO: 7.73 X10(3)/MCL (ref 4.5–11.5)

## 2024-01-07 PROCEDURE — 99900026 HC AIRWAY MAINTENANCE (STAT)

## 2024-01-07 PROCEDURE — 25000003 PHARM REV CODE 250: Performed by: INTERNAL MEDICINE

## 2024-01-07 PROCEDURE — 80053 COMPREHEN METABOLIC PANEL: CPT

## 2024-01-07 PROCEDURE — 94761 N-INVAS EAR/PLS OXIMETRY MLT: CPT

## 2024-01-07 PROCEDURE — 94003 VENT MGMT INPAT SUBQ DAY: CPT

## 2024-01-07 PROCEDURE — 63600175 PHARM REV CODE 636 W HCPCS: Performed by: INTERNAL MEDICINE

## 2024-01-07 PROCEDURE — 27100171 HC OXYGEN HIGH FLOW UP TO 24 HOURS

## 2024-01-07 PROCEDURE — 25000003 PHARM REV CODE 250: Performed by: UROLOGY

## 2024-01-07 PROCEDURE — 63600175 PHARM REV CODE 636 W HCPCS

## 2024-01-07 PROCEDURE — 63600175 PHARM REV CODE 636 W HCPCS: Performed by: STUDENT IN AN ORGANIZED HEALTH CARE EDUCATION/TRAINING PROGRAM

## 2024-01-07 PROCEDURE — 25000003 PHARM REV CODE 250: Performed by: STUDENT IN AN ORGANIZED HEALTH CARE EDUCATION/TRAINING PROGRAM

## 2024-01-07 PROCEDURE — 27200966 HC CLOSED SUCTION SYSTEM

## 2024-01-07 PROCEDURE — 99900035 HC TECH TIME PER 15 MIN (STAT)

## 2024-01-07 PROCEDURE — 20000000 HC ICU ROOM

## 2024-01-07 PROCEDURE — 83735 ASSAY OF MAGNESIUM: CPT

## 2024-01-07 PROCEDURE — 85025 COMPLETE CBC W/AUTO DIFF WBC: CPT

## 2024-01-07 PROCEDURE — 51702 INSERT TEMP BLADDER CATH: CPT

## 2024-01-07 RX ORDER — ENOXAPARIN SODIUM 150 MG/ML
1 INJECTION SUBCUTANEOUS EVERY 12 HOURS
Status: DISCONTINUED | OUTPATIENT
Start: 2024-01-07 | End: 2024-01-16 | Stop reason: HOSPADM

## 2024-01-07 RX ORDER — GLYCOPYRROLATE 0.2 MG/ML
0.1 INJECTION INTRAMUSCULAR; INTRAVENOUS ONCE
Status: COMPLETED | OUTPATIENT
Start: 2024-01-07 | End: 2024-01-07

## 2024-01-07 RX ADMIN — DEXMEDETOMIDINE HYDROCHLORIDE 0.7 MCG/KG/HR: 4 INJECTION INTRAVENOUS at 06:01

## 2024-01-07 RX ADMIN — DILTIAZEM HYDROCHLORIDE 240 MG: 60 TABLET, FILM COATED ORAL at 09:01

## 2024-01-07 RX ADMIN — ASPIRIN 81 MG CHEWABLE TABLET 81 MG: 81 TABLET CHEWABLE at 08:01

## 2024-01-07 RX ADMIN — BACITRACIN ZINC, NEOMYCIN, POLYMYXIN B: 400; 3.5; 5 OINTMENT TOPICAL at 08:01

## 2024-01-07 RX ADMIN — FENTANYL CITRATE 50 MCG: 50 INJECTION, SOLUTION INTRAMUSCULAR; INTRAVENOUS at 08:01

## 2024-01-07 RX ADMIN — FENTANYL CITRATE 50 MCG: 50 INJECTION, SOLUTION INTRAMUSCULAR; INTRAVENOUS at 03:01

## 2024-01-07 RX ADMIN — GLYCOPYRROLATE 0.1 MG: 0.2 INJECTION INTRAMUSCULAR; INTRAVENOUS at 04:01

## 2024-01-07 RX ADMIN — ENOXAPARIN SODIUM 120 MG: 150 INJECTION SUBCUTANEOUS at 08:01

## 2024-01-07 RX ADMIN — FAMOTIDINE 20 MG: 10 INJECTION, SOLUTION INTRAVENOUS at 09:01

## 2024-01-07 RX ADMIN — LOSARTAN POTASSIUM 50 MG: 50 TABLET, FILM COATED ORAL at 08:01

## 2024-01-07 RX ADMIN — QUETIAPINE FUMARATE 25 MG: 25 TABLET ORAL at 08:01

## 2024-01-07 RX ADMIN — LABETALOL HYDROCHLORIDE 10 MG: 5 INJECTION, SOLUTION INTRAVENOUS at 04:01

## 2024-01-07 RX ADMIN — DOCUSATE SODIUM 100 MG: 50 LIQUID ORAL at 08:01

## 2024-01-07 RX ADMIN — LABETALOL HYDROCHLORIDE 10 MG: 5 INJECTION, SOLUTION INTRAVENOUS at 02:01

## 2024-01-07 RX ADMIN — ATORVASTATIN CALCIUM 10 MG: 10 TABLET, FILM COATED ORAL at 08:01

## 2024-01-07 RX ADMIN — LEVETIRACETAM 1000 MG: 100 SOLUTION ORAL at 08:01

## 2024-01-07 RX ADMIN — FENTANYL CITRATE 50 MCG: 50 INJECTION, SOLUTION INTRAMUSCULAR; INTRAVENOUS at 04:01

## 2024-01-07 RX ADMIN — DEXMEDETOMIDINE HYDROCHLORIDE 0.6 MCG/KG/HR: 4 INJECTION INTRAVENOUS at 11:01

## 2024-01-07 RX ADMIN — ACETAMINOPHEN 650 MG: 650 SOLUTION ORAL at 08:01

## 2024-01-07 RX ADMIN — DEXMEDETOMIDINE HYDROCHLORIDE 0.6 MCG/KG/HR: 4 INJECTION INTRAVENOUS at 04:01

## 2024-01-07 RX ADMIN — CARVEDILOL 3.12 MG: 3.12 TABLET, FILM COATED ORAL at 08:01

## 2024-01-07 RX ADMIN — FENTANYL CITRATE 50 MCG: 50 INJECTION, SOLUTION INTRAMUSCULAR; INTRAVENOUS at 01:01

## 2024-01-07 NOTE — PROGRESS NOTES
[facility] - [facility]  Pulmonary Critical Care Note    Patient Name: Delio Daniel Jr.  MRN: [id number]  Admission Date: 12/19/2023  Hospital Length of Stay: 19 days  Code Status: Full Code  Attending Provider: Efrain Salcido MD  Primary Care Provider: Candy, Primary Doctor     Subjective:     HPI:   Delio Daniel Jr. is a 68 y.o. 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 His last known normal was 9:15 per EMS. Stroke protocol in ED initiated. Unofficial CT head showed possible dense R MCA. Pt taken to cath lab for BRAD thrombectomy. Admitted to ICU for post-operative care and monitoring.     Hospital Course/Significant events:  12/19/2023 - Admitted to ICU s/p right internal carotid artery thrombectomy  12/20/2023 - 12/20/2023 he had a rapid deterioration in his neurologic status with CT showing, as expected, a large right MCA distribution infarct with marked right-to-left shift. s/p craniectomy  12/29/23 - s/p tracheotomy  1/2/2024- PEG tube placed     24 Hour Interval History:  No acute events over night. Hypertensive with SBP 150s otherwise vital signs remained stable and patient is afebrile. Remains on ventilation via tracheostomy and sedated on precedex. Vent settings currently on AC mode RR20/PEEP5/FiO2%30. He does not follow commands.Keppra and Seroquel changed to G-tube. Currently pending placement LTAC vs nursing home, CM working with family.      Past Medical History:   Diagnosis Date    Arthritis     Atrial fibrillation     BPH (benign prostatic hyperplasia)     Cardiac arrest     Coronary artery disease     Cyst, kidney,  acquired     Diverticulosis     Hyperlipidemia     Hypertension     MI (myocardial infarction)     Obesity     Steatosis of liver      Past Surgical History:   Procedure Laterality Date    A-V CARDIAC PACEMAKER INSERTION Right     CARDIAC CATHETERIZATION      COLONOSCOPY W/ BIOPSIES      CRANIECTOMY Right 12/20/2023    Procedure: CRANIECTOMY;  Surgeon: Artem Can MD;  Location: [facility] OR;  Service: Neurosurgery;  Laterality: Right;    ESOPHAGOGASTRODUODENOSCOPY W/ PEG N/A 1/2/2024    Procedure: PEG;  Surgeon: Tani Day MD;  Location: [facility] ENDOSCOPY;  Service: Gastroenterology;  Laterality: N/A;    excision of colon      TRACHEOSTOMY N/A 12/29/2023    Procedure: CREATION, TRACHEOSTOMY;  Surgeon: Patricia Winslow MD;  Location: [facility] OR;  Service: ENT;  Laterality: N/A;  REQ 1130 //  NEEDS 2 SCRUBS       Social History     Socioeconomic History    Marital status:     Number of children: 9   Occupational History    Occupation: retired   Tobacco Use    Smoking status: Former    Smokeless tobacco: Never   Substance and Sexual Activity    Alcohol use: Not Currently    Drug use: Not Currently    Sexual activity: Not Currently     Partners: Female     Social Determinants of Health     Financial Resource Strain: Low Risk  (10/19/2022)    Overall Financial Resource Strain (CARDIA)     Difficulty of Paying Living Expenses: Not hard at all   Food Insecurity: No Food Insecurity (10/19/2022)    Hunger Vital Sign     Worried About Running Out of Food in the Last Year: Never true     Ran Out of Food in the Last Year: Never true   Transportation Needs: No Transportation Needs (10/19/2022)    PRAPARE - Transportation     Lack of Transportation (Medical): No     Lack of Transportation (Non-Medical): No   Physical Activity: Sufficiently Active (10/19/2022)    Exercise Vital Sign     Days of Exercise per Week: 6 days     Minutes of Exercise per Session: 60 min   Stress: No Stress Concern Present (10/19/2022)     [facility] of Occupational Health - Occupational Stress Questionnaire     Feeling of Stress : Not at all   Social Connections: Unknown (10/19/2022)    Social Connection and Isolation Panel [NHANES]     Frequency of Communication with Friends and Family: More than three times a week     Frequency of Social Gatherings with Friends and Family: More than three times a week     Attends Scientologist Services: More than 4 times per year     Active Member of Clubs or Organizations: No     Attends Club or Organization Meetings: Never   Housing Stability: Low Risk  (10/19/2022)    Housing Stability Vital Sign     Unable to Pay for Housing in the Last Year: No     Number of Places Lived in the Last Year: 1     Unstable Housing in the Last Year: No     Current Outpatient Medications   Medication Instructions    albuterol (PROVENTIL/VENTOLIN HFA) 90 mcg/actuation inhaler 2 puffs, Inhalation, Every 6 hours PRN, Rescue    aspirin 81 MG Chew chew and swallow 1 tablet by mouth daily    atorvastatin (LIPITOR) 40 mg, Oral, Daily    cetirizine (ZYRTEC) 10 mg, Oral, Daily    diltiaZEM (CARDIZEM CD) 360 mg, Oral, Daily    losartan (COZAAR) 50 mg, Oral, Daily    metoprolol succinate (TOPROL-XL) 200 mg, Oral, 2 times daily    omeprazole (PRILOSEC) 20 mg, Oral, Daily, One tab by mouth daily    potassium chloride (KLOR-CON) 20 mEq Pack 20 mEq, Oral, Daily    spironolactone (ALDACTONE) 50 mg, Oral, Daily    torsemide (DEMADEX) 10 mg, Oral, Daily    vitamin D (VITAMIN D3) 1,000 Units, Oral, Daily    XARELTO 20 mg Tab TAKE 1 TABLET BY MOUTH DAILY with SUPPER     Current Inpatient Medications   aspirin  81 mg Per G Tube Daily    atorvastatin  10 mg Per G Tube Daily    carvediloL  3.125 mg Per G Tube BID    diltiaZEM  240 mg Per G Tube Daily    docusate  100 mg Per G Tube BID    enoxparin  1 mg/kg Subcutaneous Q12H (treatment, non-standard time)    famotidine (PF)  20 mg Intravenous Daily    levetiracetam  1,000 mg Per G Tube BID    losartan   50 mg Per G Tube Daily    mannitol 20%  75 g Intravenous Once    QUEtiapine  25 mg Per G Tube BID     Current Intravenous Infusions   dexmedeTOMIDine (Precedex) infusion (titrating) 0.5 mcg/kg/hr (01/06/24 2255)    fentanyl Stopped (12/31/23 0634)    NORepinephrine bitartrate-D5W Stopped (12/20/23 1929)       Review of Systems   Unable to perform ROS: Critical illness   All other systems reviewed and are negative.     Objective:       Intake/Output Summary (Last 24 hours) at 1/7/2024 0326  Last data filed at 1/6/2024 2149  Gross per 24 hour   Intake 2854.04 ml   Output 2600 ml   Net 254.04 ml       Vital Signs (Most Recent):  Temp: 99.5 °F (37.5 °C) (01/06/24 2000)  Pulse: 90 (01/07/24 0153)  Resp: 20 (01/07/24 0153)  BP: 133/81 (01/06/24 2100)  SpO2: 98 % (01/07/24 0153)  Body mass index is 36.88 kg/m².  Weight: 119.9 kg (264 lb 5.3 oz) Vital Signs (24h Range):  Temp:  [99.3 °F (37.4 °C)-100.9 °F (38.3 °C)] 99.5 °F (37.5 °C)  Pulse:  [] 90  Resp:  [0-28] 20  SpO2:  [93 %-100 %] 98 %  BP: (108-170)/() 133/81     Physical Exam  General: No acute distress. Sleeping at this timee.  HEENT: Normocephalic, atraumatic. Face symmetric.    Cardiovascular: Regular rate & rhythm  Pulmonary: Bilateral symmetric chest rise, patient mechanically ventilated via trach  Abdominal:  non-distended, soft, round, positive bowel sounds  Extremities: No clubbing or cyanosis.  1+ pitting edema distal LUE  Neuro:   trached on th vent; sedated on precedex.  Patient has non purposeful movements of RUE, does not follow commands.      Lines/Drains/Airways       Drain  Duration                  Urethral Catheter 12/20/23 1007 17 days         Gastrostomy/Enterostomy 01/02/24 1230 LUQ 4 days              Airway  Duration             Adult Surgical Airway 12/29/23 1229 8 days              Peripheral Intravenous Line  Duration                  Peripheral IV - Single Lumen 12/25/23 1705 20 G Anterior;Left;Proximal Forearm 12 days          Midline Catheter Insertion/Assessment  - Single Lumen 12/29/23 1400 Right cephalic vein (lateral side of arm) 8 days                    Significant Labs:    Lab Results   Component Value Date    WBC 7.73 01/07/2024    HGB 10.5 (L) 01/07/2024    HCT 34.3 (L) 01/07/2024    MCV 92.5 01/07/2024     01/07/2024       BMP  Lab Results   Component Value Date     01/07/2024    K 3.7 01/07/2024    CHLORIDE 106 01/07/2024    CO2 29 01/07/2024    BUN 17.9 01/07/2024    CREATININE 0.79 01/07/2024    CALCIUM 8.3 (L) 01/07/2024    AGAP 7.0 01/04/2024    EGFRNONAA 61 04/23/2022     ABG  Recent Labs   Lab 12/31/23  0806   PH 7.450   PO2 89.0   PCO2 40.0   HCO3 27.8*   POCBASEDEF 3.50*       Mechanical Ventilation Support:  Vent Mode: A/C (01/07/24 0153)  Set Rate: 20 BPM (01/07/24 0153)  Vt Set: 460 mL (01/07/24 0153)  Pressure Support: 10 cmH20 (01/04/24 2337)  PEEP/CPAP: 5 cmH20 (01/07/24 0153)  Oxygen Concentration (%): 30 (01/06/24 2125)  Peak Airway Pressure: 25 cmH20 (01/07/24 0153)  Total Ve: 8.7 L/m (01/07/24 0153)  F/VT Ratio<105 (RSBI): (!) 44.74 (01/07/24 0153)      Significant Imaging:  No imaging this AM      Assessment/Plan:     Assessment  CVA s/p right ICA and MCA M3  branch thrombectomy s/p craniectomy with hemorrhagic conversion  Superficial thrombosis in left cephalic vein  Confirmed by DVT U/S on 12/26/2023  Acute hypoxic respiratory failure requiring intubation  Atrial fibrillation w/ RVR-rate now controlled.  Onychomycosis  HX of CAD, HTN, HLD, ADA  Hypernatremia  Urine osmolality indicative of extrarenal process, likely 2/2 NG tube as well as lack of intake      Plan  - Free water flushes to PEG Currently at 210 ml q 4 hours. Initiate TF as tolerated.   -Continue efforts to wean sedation and mechanical ventilation wean with trach as tolerated  -Continue bowel regimen of Miralax TID and Senna BID   -Neurosurgery signed off on 1/3/24   -Bone flap precautions and continue helmet use out of  bed   -Seizure precautions and continue Keppra BID via Gtube   -Will require outpatient referral from placement back to outpatient neurosurgery office for consideration of cranioplasty   -Gastroenterology signed off on 1/3/24   -Continue Tfs and PEG care   -Neurology signed off on 12/22/23   -SBP goal <160    -Continue ASA 81mg qd   -Cardiology signed off on 12/25/23   -Continue Diltiazem 240mg qd, Losartan and Metoprolol tartrate 100mg BID    -Lopressor IV PRN for HR>120   -Remains on full-dose Lovenox for left cephalic vein thrombosis  -Will need to discuss placement - LTAC vs nursing home. Will follow up with CM.     DVT ppx: FD Lovenox   GI ppx: Casey Barlow MD   Pulmonary Critical Care Medicine  [facility]  DOS: 01/07/2024

## 2024-01-07 NOTE — CONSULTS
Delio Daniel  1955  [id number]  1/7/2024    CONSULTING PHYSICIAN: Dr. Efrain Salcido    REASON FOR CONSULTATION: scrotal edema, patient has a underwood    HPI:  The patient is a 68-year-old male with a past medical history of atrial fibrillation on Xarelto, hypertension, CAD, heart failure, ICD, neuroendocrine carcinoma, BPH, was admitted to the hospital on 12/19/2023 with left-sided weakness.  Imaging revealed right MCA infarct.  He was status post right internal carotid artery thrombectomy, followed by right craniectomy due to rapid neurologic deterioration/cerebral edema.  Awaiting LTAC placement.  He remains on mechanical ventilation via tracheostomy.  Patient was noted to have some penile swelling and Urology has been consulted for evaluation.    Past Medical History:   Diagnosis Date    Arthritis     Atrial fibrillation     BPH (benign prostatic hyperplasia)     Cardiac arrest     Coronary artery disease     Cyst, kidney, acquired     Diverticulosis     Hyperlipidemia     Hypertension     MI (myocardial infarction)     Obesity     Steatosis of liver      Past Surgical History:   Procedure Laterality Date    A-V CARDIAC PACEMAKER INSERTION Right     CARDIAC CATHETERIZATION      COLONOSCOPY W/ BIOPSIES      CRANIECTOMY Right 12/20/2023    Procedure: CRANIECTOMY;  Surgeon: Artem Can MD;  Location: [facility];  Service: Neurosurgery;  Laterality: Right;    ESOPHAGOGASTRODUODENOSCOPY W/ PEG N/A 1/2/2024    Procedure: PEG;  Surgeon: Tani Day MD;  Location: [facility] ENDOSCOPY;  Service: Gastroenterology;  Laterality: N/A;    excision of colon      TRACHEOSTOMY N/A 12/29/2023    Procedure: CREATION, TRACHEOSTOMY;  Surgeon: Patricia Winslow MD;  Location: [facility] OR;  Service: ENT;  Laterality: N/A;  REQ 1130 //  NEEDS 2 SCRUBS     Family History   Problem Relation Age of Onset    Hypertension Mother     Hypertension Father     Hypertension Sister      Social History     Tobacco Use    Smoking status:  Former    Smokeless tobacco: Never   Substance Use Topics    Alcohol use: Not Currently    Drug use: Not Currently     Current Facility-Administered Medications   Medication Dose Route Frequency Provider Last Rate Last Admin    0.9%  NaCl infusion (for blood administration)   Intravenous Q24H PRN Fidel Kraus AGACNP-BC        acetaminophen oral solution 650 mg  650 mg Per G Tube Q4H PRN KODI Drake MD   650 mg at 01/06/24 1212    aspirin chewable tablet 81 mg  81 mg Per G Tube Daily KODI Drake MD   81 mg at 01/07/24 0801    atorvastatin tablet 10 mg  10 mg Per G Tube Daily KODI Drake MD   10 mg at 01/07/24 0801    carvediloL tablet 3.125 mg  3.125 mg Per G Tube BID KODI Drake MD   3.125 mg at 01/07/24 0801    dexmedetomidine (PRECEDEX) 400mcg/100mL 0.9% NaCL infusion  0-1.4 mcg/kg/hr Intravenous Continuous Italo Orozco MD   Stopped at 01/07/24 1050    dextrose 10% bolus 125 mL 125 mL  12.5 g Intravenous PRN Eleazar Westbrook MD        dextrose 10% bolus 250 mL 250 mL  25 g Intravenous PRN Eleazar Westbrook MD        diltiaZEM tablet 240 mg  240 mg Per G Tube Daily KODI Drake MD   240 mg at 01/07/24 0959    docusate 50 mg/5 mL liquid 100 mg  100 mg Per G Tube BID KODI Drake MD   100 mg at 01/07/24 0801    enoxaparin injection 120 mg  1 mg/kg Subcutaneous Q12H (treatment, non-standard time) Efrain Salcido MD        famotidine (PF) injection 20 mg  20 mg Intravenous Daily Kenny Richter DO   20 mg at 01/07/24 0900    fentaNYL 2500 mcg in 0.9% sodium chloride 250 mL infusion premix (titrating)  0-250 mcg/hr Intravenous Continuous Arun Norman DO   Stopped at 12/31/23 0634    fentaNYL injection 50 mcg  50 mcg Intravenous Q1H PRN Eleazar Westbrook MD   50 mcg at 01/07/24 1305    glucagon (human recombinant) injection 1 mg  1 mg Intramuscular PRN Eleazar Westbrook MD        hydrALAZINE injection 10 mg  10 mg  Intravenous Q2H PRN Kenny Richter Keat, DO   10 mg at 01/06/24 0604    insulin aspart U-100 injection 0-5 Units  0-5 Units Subcutaneous Q6H PRN Eleazar Westbrook MD   2 Units at 12/20/23 0624    ipratropium 0.02 % nebulizer solution 0.5 mg  0.5 mg Nebulization Q6H PRN Christiano Taylor MD        labetaloL injection 10 mg  10 mg Intravenous Q2H PRN Kenny Richter, DO   10 mg at 12/31/23 0048    levalbuterol nebulizer solution 1.25 mg  1.25 mg Nebulization Q6H PRN Christiano Taylor MD        levetiracetam 500 mg/5 mL (5 mL) liquid Soln 1,000 mg  1,000 mg Per G Tube BID Efrain Salcido MD   1,000 mg at 01/07/24 0801    losartan tablet 50 mg  50 mg Per G Tube Daily KODI Drake MD   50 mg at 01/07/24 0801    mannitol 20% infusion 75 g  75 g Intravenous Once Fidel Kraus, AGACNP-BC        methyl salicylate liquid   Topical (Top) PRN Efrain Salcido MD   Given at 01/06/24 0830    metoprolol injection 5 mg  5 mg Intravenous Q5 Min PRN Amol Gipsonle, DO   5 mg at 12/31/23 0339    neomycin-bacitracin-polymyxin ointment   Topical (Top) BID Kirk Mullins MD        NORepinephrine 8 mg in dextrose 5% 250 mL infusion  0-3 mcg/kg/min Intravenous Continuous Italo Orozco MD   Stopped at 12/20/23 1929    ondansetron injection 8 mg  8 mg Intravenous Q6H PRN Stroda Patrick, DO   8 mg at 12/20/23 0045    polyethylene glycol packet 17 g  17 g Per G Tube Daily PRN KODI Drake MD        QUEtiapine tablet 25 mg  25 mg Per G Tube BID Efrain Salcido MD   25 mg at 01/07/24 0801    sodium chloride 0.9% flush 10 mL  10 mL Intravenous PRN Ijeoma Hernandez, NP         Review of patient's allergies indicates:  No Known Allergies    ROS: 12 point review of systems negative other than the HPI    PHYSICAL EXAM:  Vitals:    01/07/24 1000 01/07/24 1030 01/07/24 1233 01/07/24 1305   BP: 112/83      Pulse: 90 66 78    Resp:   (!) 24 20   Temp:       TempSrc:       SpO2: 98% 97% 95%    Weight:       Height:            Intake/Output Summary (Last 24 hours) at 1/7/2024 1334  Last data filed at 1/7/2024 1051  Gross per 24 hour   Intake 1220.35 ml   Output 3850 ml   Net -2629.65 ml       GEN: NAD  HEENT: right craniectomy defect  CV: RRR  RESP: on mechanical ventilation via tracheostomy  : paraphimosis noted with significant swelling, manually reduced by Dr. Mullins. No distal necrosis noted. Fernandez catheter replaced without difficulty. Antibiotics ointment applied.     LABS:  Recent Results (from the past 24 hour(s))   Magnesium    Collection Time: 01/07/24  1:45 AM   Result Value Ref Range    Magnesium Level 2.10 1.60 - 2.60 mg/dL   Comprehensive Metabolic Panel    Collection Time: 01/07/24  1:45 AM   Result Value Ref Range    Sodium Level 141 136 - 145 mmol/L    Potassium Level 3.7 3.5 - 5.1 mmol/L    Chloride 106 98 - 107 mmol/L    Carbon Dioxide 29 23 - 31 mmol/L    Glucose Level 107 82 - 115 mg/dL    Blood Urea Nitrogen 17.9 8.4 - 25.7 mg/dL    Creatinine 0.79 0.73 - 1.18 mg/dL    Calcium Level Total 8.3 (L) 8.8 - 10.0 mg/dL    Protein Total 6.2 5.8 - 7.6 gm/dL    Albumin Level 2.2 (L) 3.4 - 4.8 g/dL    Globulin 4.0 (H) 2.4 - 3.5 gm/dL    Albumin/Globulin Ratio 0.6 (L) 1.1 - 2.0 ratio    Bilirubin Total 0.5 <=1.5 mg/dL    Alkaline Phosphatase 50 40 - 150 unit/L    Alanine Aminotransferase 44 0 - 55 unit/L    Aspartate Aminotransferase 41 (H) 5 - 34 unit/L    eGFR >60 mls/min/1.73/m2   CBC with Differential    Collection Time: 01/07/24  1:45 AM   Result Value Ref Range    WBC 7.73 4.50 - 11.50 x10(3)/mcL    RBC 3.71 (L) 4.70 - 6.10 x10(6)/mcL    Hgb 10.5 (L) 14.0 - 18.0 g/dL    Hct 34.3 (L) 42.0 - 52.0 %    MCV 92.5 80.0 - 94.0 fL    MCH 28.3 27.0 - 31.0 pg    MCHC 30.6 (L) 33.0 - 36.0 g/dL    RDW 14.7 11.5 - 17.0 %    Platelet 321 130 - 400 x10(3)/mcL    MPV 11.3 (H) 7.4 - 10.4 fL    Neut % 62.5 %    Lymph % 23.7 %    Mono % 7.8 %    Eos % 3.9 %    Basophil % 0.4 %    Lymph # 1.83 0.6 - 4.6 x10(3)/mcL    Neut # 4.84  2.1 - 9.2 x10(3)/mcL    Mono # 0.60 0.1 - 1.3 x10(3)/mcL    Eos # 0.30 0 - 0.9 x10(3)/mcL    Baso # 0.03 <=0.2 x10(3)/mcL    IG# 0.13 (H) 0 - 0.04 x10(3)/mcL    IG% 1.7 %    NRBC% 0.0 %       IMAGING:  Imaging Results              IR Thrombectomy Intracranial Inc all Imaging (Final result)  Result time 12/19/23 15:11:53      Final result by Tani Calderon MD (12/19/23 15:11:53)                   Impression:      Fluoroscopic assistance provided as above.      Electronically signed by: Tani Calderon  Date:    12/19/2023  Time:    15:11               Narrative:    EXAMINATION:  IR THROMBECTOMY INTRACRANIAL INC ALL IMAGING    CLINICAL HISTORY:  Cerebral infarction, unspecifiedstroke;    FINDINGS:  Fluoroscopic assistance provided during vascular procedure.  Images were independently interpreted by the attending physician performing the procedure.    Fluoro time 9.7 minutes.    Reference Air Kerma: 917 mGy.                                       CTA STROKE MULTI-PHASE (Final result)  Result time 12/19/23 12:59:46      Final result by Laureen Christina MD (12/19/23 12:59:46)                   Impression:      Irregular appearance of the right internal carotid artery near the skull base and petrous portion.  This is of uncertain etiology.  This could be related to soft atheromatous plaque, ill-defined dissection flap or artifact.    Poor enhancement of the right anterior circulation including the MCA and HÉCTOR on arterial phase.  There is delayed enhancement of the right HÉCTOR and MCA seen on delayed phase postcontrast imaging suggesting upstream/inflow abnormality.    Very subtle asymmetric loss of gray-white differentiation in the right cerebral hemisphere most pronounced at the frontal lobe and basal ganglia.  No appreciable hemorrhage.    Findings discussed with clinician caring for patient Dr. Randle 12/19/2023 12:39.      Electronically signed by: Laureen Christina  Date:    12/19/2023  Time:    12:59                Narrative:    EXAMINATION:  CTA STROKE MULTI-PHASE    CLINICAL HISTORY:  Neuro deficit, acute, stroke suspected;    TECHNIQUE:  CT angiogram was performed from the level of the jackie to the vertex prior to and following the IV administration of intravenous contrast.  Axial, sagittal and coronal reconstructions and maximum intensity projection reconstructions were performed. Arterial stenosis percentages are based on NASCET measurement criteria.    Automated tube current modulation, weight-based exposure dosing, and/or iterative reconstruction technique utilized to reach lowest reasonably achievable exposure rate.    DLP: 2045 mGycm    COMPARISON:  CT head 12/19/2023 at 10:57 hours    FINDINGS:  CTA NECK:    AORTIC ARCH AND GREAT VESSELS: 2 vessel left aortic arch.    RIGHT ICA: There is atherosclerotic plaque at the carotid bulb and proximal internal carotid artery with less than 50% stenosis.  There is irregular contour and inhomogeneous enhancement of the cervical carotid artery at the skull base and at the petrous carotid artery (for example series 1, image 289).    LEFT ICA: Mild atherosclerotic plaque at the carotid bulb without hemodynamically significant stenosis.    VERTEBRAL ARTERIES: Diminutive vertebral arteries without stenosis.    CTA HEAD:    ANTERIOR CIRCULATION: On the arterial phase imaging there is asymmetric hypo perfusion and irregular appearance of the cervical carotid artery on the right.  There is poor enhancement at the HÉCTOR and M1 segment.  Contrast fades distally towards the M2 segment.  There is gross asymmetric hypoenhancement of the vasculature at the sylvian fissure when comparing right to left on the arterial phase.  On a slightly delayed phase obtained approximately 30-40 seconds later there is enhancement at the right anterior circulation which is now more symmetric compared to the left suggesting potential delayed in flow phenomenon or perfusion via the [id number].  The  left anterior circulation enhances normally without significant stenosis.    POSTERIOR CIRCULATION: No hemodynamically significant stenosis, aneurysmal dilatation, or dissection.    NON-VASCULAR STRUCTURES (LIMITED EVALUATION): There is very subtle loss of gray-white differentiation in the region of the insular cortex and right frontal lobe and slight blurring of delineation of the basal ganglia on the right.  No appreciable hemorrhage.    Poor dentition.  Dental caries and periapical lucency.                                       CT HEAD FOR STROKE (Final result)  Result time 12/19/23 11:33:25   Procedure changed from CT Head Without Contrast     Final result by Tani Calderon MD (12/19/23 11:33:25)                   Impression:      No acute intracranial findings or significant interval change compared to May 2023.      Electronically signed by: Tani Calderon  Date:    12/19/2023  Time:    11:33               Narrative:    EXAMINATION:  CT HEAD FOR STROKE    CLINICAL HISTORY:  Neuro deficit, acute, stroke suspected;    TECHNIQUE:  CT imaging of the head performed from the skull base to the vertex without intravenous contrast.  mGycm. Automatic exposure control, adjustment of mA/kV or iterative reconstruction technique was used to reduce radiation.    COMPARISON:  23 May 2023    FINDINGS:  There is no acute cortical infarct, hemorrhage or mass lesion.  No new parenchymal attenuation abnormality.  Ventricular size is stable.  There are vascular calcifications.    Visualized paranasal sinuses and mastoid air cells are clear.                                      ASSESSMENT:  -paraphimosis    PLAN:  -Paraphimosis manually reduced at bedside by Dr. Mullins. Continue indwelling underwood, antibiotic ointment. Do not retract foreskin. Will check in on patient tomorrow. Discussed with nursing.       Manisha Johnson NP

## 2024-01-08 LAB
ALBUMIN SERPL-MCNC: 2.2 G/DL (ref 3.4–4.8)
ALBUMIN/GLOB SERPL: 0.6 RATIO (ref 1.1–2)
ALP SERPL-CCNC: 48 UNIT/L (ref 40–150)
ALT SERPL-CCNC: 40 UNIT/L (ref 0–55)
AST SERPL-CCNC: 37 UNIT/L (ref 5–34)
BASOPHILS # BLD AUTO: 0.04 X10(3)/MCL
BASOPHILS NFR BLD AUTO: 0.3 %
BILIRUB SERPL-MCNC: 0.5 MG/DL
BUN SERPL-MCNC: 13.5 MG/DL (ref 8.4–25.7)
CALCIUM SERPL-MCNC: 8.1 MG/DL (ref 8.8–10)
CHLORIDE SERPL-SCNC: 103 MMOL/L (ref 98–107)
CO2 SERPL-SCNC: 29 MMOL/L (ref 23–31)
CREAT SERPL-MCNC: 0.79 MG/DL (ref 0.73–1.18)
EOSINOPHIL # BLD AUTO: 0.12 X10(3)/MCL (ref 0–0.9)
EOSINOPHIL NFR BLD AUTO: 1 %
ERYTHROCYTE [DISTWIDTH] IN BLOOD BY AUTOMATED COUNT: 15 % (ref 11.5–17)
GFR SERPLBLD CREATININE-BSD FMLA CKD-EPI: >60 MLS/MIN/1.73/M2
GLOBULIN SER-MCNC: 3.8 GM/DL (ref 2.4–3.5)
GLUCOSE SERPL-MCNC: 117 MG/DL (ref 82–115)
HCT VFR BLD AUTO: 30.8 % (ref 42–52)
HGB BLD-MCNC: 9.9 G/DL (ref 14–18)
IMM GRANULOCYTES # BLD AUTO: 0.08 X10(3)/MCL (ref 0–0.04)
IMM GRANULOCYTES NFR BLD AUTO: 0.7 %
LYMPHOCYTES # BLD AUTO: 1.6 X10(3)/MCL (ref 0.6–4.6)
LYMPHOCYTES NFR BLD AUTO: 13.9 %
MAGNESIUM SERPL-MCNC: 2.1 MG/DL (ref 1.6–2.6)
MCH RBC QN AUTO: 28.8 PG (ref 27–31)
MCHC RBC AUTO-ENTMCNC: 32.1 G/DL (ref 33–36)
MCV RBC AUTO: 89.5 FL (ref 80–94)
MONOCYTES # BLD AUTO: 0.76 X10(3)/MCL (ref 0.1–1.3)
MONOCYTES NFR BLD AUTO: 6.6 %
NEUTROPHILS # BLD AUTO: 8.9 X10(3)/MCL (ref 2.1–9.2)
NEUTROPHILS NFR BLD AUTO: 77.5 %
NRBC BLD AUTO-RTO: 0 %
PLATELET # BLD AUTO: 322 X10(3)/MCL (ref 130–400)
PMV BLD AUTO: 11.6 FL (ref 7.4–10.4)
POTASSIUM SERPL-SCNC: 3.5 MMOL/L (ref 3.5–5.1)
PROT SERPL-MCNC: 6 GM/DL (ref 5.8–7.6)
RBC # BLD AUTO: 3.44 X10(6)/MCL (ref 4.7–6.1)
SODIUM SERPL-SCNC: 140 MMOL/L (ref 136–145)
WBC # SPEC AUTO: 11.5 X10(3)/MCL (ref 4.5–11.5)

## 2024-01-08 PROCEDURE — 25000003 PHARM REV CODE 250: Performed by: INTERNAL MEDICINE

## 2024-01-08 PROCEDURE — 27100171 HC OXYGEN HIGH FLOW UP TO 24 HOURS

## 2024-01-08 PROCEDURE — 99900035 HC TECH TIME PER 15 MIN (STAT)

## 2024-01-08 PROCEDURE — 83735 ASSAY OF MAGNESIUM: CPT

## 2024-01-08 PROCEDURE — 85025 COMPLETE CBC W/AUTO DIFF WBC: CPT

## 2024-01-08 PROCEDURE — 80053 COMPREHEN METABOLIC PANEL: CPT

## 2024-01-08 PROCEDURE — 63600175 PHARM REV CODE 636 W HCPCS: Performed by: INTERNAL MEDICINE

## 2024-01-08 PROCEDURE — 63600175 PHARM REV CODE 636 W HCPCS

## 2024-01-08 PROCEDURE — 25000003 PHARM REV CODE 250: Performed by: STUDENT IN AN ORGANIZED HEALTH CARE EDUCATION/TRAINING PROGRAM

## 2024-01-08 PROCEDURE — 94761 N-INVAS EAR/PLS OXIMETRY MLT: CPT

## 2024-01-08 PROCEDURE — 20000000 HC ICU ROOM

## 2024-01-08 PROCEDURE — 94003 VENT MGMT INPAT SUBQ DAY: CPT

## 2024-01-08 PROCEDURE — 99900026 HC AIRWAY MAINTENANCE (STAT)

## 2024-01-08 PROCEDURE — 25000003 PHARM REV CODE 250: Performed by: NURSE PRACTITIONER

## 2024-01-08 RX ORDER — GUAIFENESIN 100 MG/5ML
400 SOLUTION ORAL EVERY 8 HOURS
Status: DISCONTINUED | OUTPATIENT
Start: 2024-01-08 | End: 2024-01-10

## 2024-01-08 RX ORDER — POTASSIUM CHLORIDE 14.9 MG/ML
20 INJECTION INTRAVENOUS
Status: COMPLETED | OUTPATIENT
Start: 2024-01-08 | End: 2024-01-08

## 2024-01-08 RX ADMIN — GUAIFENESIN 400 MG: 200 SOLUTION ORAL at 10:01

## 2024-01-08 RX ADMIN — LOSARTAN POTASSIUM 50 MG: 50 TABLET, FILM COATED ORAL at 08:01

## 2024-01-08 RX ADMIN — FENTANYL CITRATE 50 MCG: 50 INJECTION, SOLUTION INTRAMUSCULAR; INTRAVENOUS at 12:01

## 2024-01-08 RX ADMIN — ENOXAPARIN SODIUM 120 MG: 150 INJECTION SUBCUTANEOUS at 08:01

## 2024-01-08 RX ADMIN — POTASSIUM CHLORIDE 20 MEQ: 14.9 INJECTION, SOLUTION INTRAVENOUS at 07:01

## 2024-01-08 RX ADMIN — QUETIAPINE FUMARATE 25 MG: 25 TABLET ORAL at 08:01

## 2024-01-08 RX ADMIN — LEVETIRACETAM 1000 MG: 100 SOLUTION ORAL at 08:01

## 2024-01-08 RX ADMIN — CARVEDILOL 3.12 MG: 3.12 TABLET, FILM COATED ORAL at 08:01

## 2024-01-08 RX ADMIN — ATORVASTATIN CALCIUM 10 MG: 10 TABLET, FILM COATED ORAL at 08:01

## 2024-01-08 RX ADMIN — DEXMEDETOMIDINE HYDROCHLORIDE 0.2 MCG/KG/HR: 4 INJECTION INTRAVENOUS at 07:01

## 2024-01-08 RX ADMIN — DILTIAZEM HYDROCHLORIDE 240 MG: 60 TABLET, FILM COATED ORAL at 08:01

## 2024-01-08 RX ADMIN — POTASSIUM CHLORIDE 20 MEQ: 14.9 INJECTION, SOLUTION INTRAVENOUS at 10:01

## 2024-01-08 RX ADMIN — DOCUSATE SODIUM 100 MG: 50 LIQUID ORAL at 08:01

## 2024-01-08 RX ADMIN — BACITRACIN ZINC, NEOMYCIN, POLYMYXIN B: 400; 3.5; 5 OINTMENT TOPICAL at 08:01

## 2024-01-08 RX ADMIN — GUAIFENESIN 400 MG: 200 SOLUTION ORAL at 02:01

## 2024-01-08 RX ADMIN — ASPIRIN 81 MG CHEWABLE TABLET 81 MG: 81 TABLET CHEWABLE at 08:01

## 2024-01-08 RX ADMIN — ONDANSETRON 8 MG: 2 INJECTION INTRAMUSCULAR; INTRAVENOUS at 12:01

## 2024-01-08 RX ADMIN — FAMOTIDINE 20 MG: 10 INJECTION, SOLUTION INTRAVENOUS at 09:01

## 2024-01-08 RX ADMIN — DEXMEDETOMIDINE HYDROCHLORIDE 0.6 MCG/KG/HR: 4 INJECTION INTRAVENOUS at 08:01

## 2024-01-08 NOTE — PROGRESS NOTES
UROLOGY  PROGRESS  NOTE    Delio Daniel Jr. 1955  [id number]  1/8/2024    No acute events overnight  Fernandez functioning well    800ml UOP overnight  Bun/Cr 13.5/0.79  WBC 11.5  H&H 9.9/30.8    Intake/Output:  I/O this shift:  In: -   Out: 350 [Urine:250; Stool:100]  I/O last 3 completed shifts:  In: 2876.5 [I.V.:476.5; NG/GT:2400]  Out: 4750 [Urine:4150; Stool:600]     Exam:    NAD  Card: RRR  Resp: on mechanical ventilation via tracheostomy  : yellow urine draining to  bag. Some penile generalized penile inflammation/swelling, paraphimosis remains reduce  Extremity: no C/C/E    Recent Results (from the past 24 hour(s))   Comprehensive Metabolic Panel    Collection Time: 01/08/24  2:26 AM   Result Value Ref Range    Sodium Level 140 136 - 145 mmol/L    Potassium Level 3.5 3.5 - 5.1 mmol/L    Chloride 103 98 - 107 mmol/L    Carbon Dioxide 29 23 - 31 mmol/L    Glucose Level 117 (H) 82 - 115 mg/dL    Blood Urea Nitrogen 13.5 8.4 - 25.7 mg/dL    Creatinine 0.79 0.73 - 1.18 mg/dL    Calcium Level Total 8.1 (L) 8.8 - 10.0 mg/dL    Protein Total 6.0 5.8 - 7.6 gm/dL    Albumin Level 2.2 (L) 3.4 - 4.8 g/dL    Globulin 3.8 (H) 2.4 - 3.5 gm/dL    Albumin/Globulin Ratio 0.6 (L) 1.1 - 2.0 ratio    Bilirubin Total 0.5 <=1.5 mg/dL    Alkaline Phosphatase 48 40 - 150 unit/L    Alanine Aminotransferase 40 0 - 55 unit/L    Aspartate Aminotransferase 37 (H) 5 - 34 unit/L    eGFR >60 mls/min/1.73/m2   Magnesium    Collection Time: 01/08/24  2:26 AM   Result Value Ref Range    Magnesium Level 2.10 1.60 - 2.60 mg/dL   CBC with Differential    Collection Time: 01/08/24  2:26 AM   Result Value Ref Range    WBC 11.50 4.50 - 11.50 x10(3)/mcL    RBC 3.44 (L) 4.70 - 6.10 x10(6)/mcL    Hgb 9.9 (L) 14.0 - 18.0 g/dL    Hct 30.8 (L) 42.0 - 52.0 %    MCV 89.5 80.0 - 94.0 fL    MCH 28.8 27.0 - 31.0 pg    MCHC 32.1 (L) 33.0 - 36.0 g/dL    RDW 15.0 11.5 - 17.0 %    Platelet 322 130 - 400 x10(3)/mcL    MPV 11.6 (H) 7.4 - 10.4 fL    Neut % 77.5 %     Lymph % 13.9 %    Mono % 6.6 %    Eos % 1.0 %    Basophil % 0.3 %    Lymph # 1.60 0.6 - 4.6 x10(3)/mcL    Neut # 8.90 2.1 - 9.2 x10(3)/mcL    Mono # 0.76 0.1 - 1.3 x10(3)/mcL    Eos # 0.12 0 - 0.9 x10(3)/mcL    Baso # 0.04 <=0.2 x10(3)/mcL    IG# 0.08 (H) 0 - 0.04 x10(3)/mcL    IG% 0.7 %    NRBC% 0.0 %       Assessment:  -paraphimosis s/p manual reduction    Plan:  -Continue antibiotic ointment with underwood care  -elevation/ice   -Discussed with nursing  -Following      Manisha Johnson NP

## 2024-01-08 NOTE — CARE UPDATE
[id number] Spoke with pt's nurse who reported Beth, pt's dgtr reported they would like a referral be sent to [facility]. FOC obtained. Referral sent to [facility] thru care port.

## 2024-01-08 NOTE — PROGRESS NOTES
[facility] - [facility] ICU  Pulmonary Critical Care Note    Patient Name: Delio Daniel Jr.  MRN: [id number]  Admission Date: 12/19/2023  Hospital Length of Stay: 20 days  Code Status: Full Code  Attending Provider: Efrain Salcido MD  Primary Care Provider: Candy, Primary Doctor     Subjective:     HPI:   Delio Daniel Jr. is a 68 y.o. 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 His last known normal was 9:15 per EMS. Stroke protocol in ED initiated. Unofficial CT head showed possible dense R MCA. Pt taken to cath lab for BRAD thrombectomy. Admitted to ICU for post-operative care and monitoring.     Hospital Course/Significant events:  12/19/2023 - Admitted to ICU s/p right internal carotid artery thrombectomy  12/20/2023 - 12/20/2023 he had a rapid deterioration in his neurologic status with CT showing, as expected, a large right MCA distribution infarct with marked right-to-left shift. s/p craniectomy  12/29/23 - s/p tracheotomy  1/2/2024- PEG tube placed   1/7/23 - Catheter exchanged    24 Hour Interval History:  No acute events over night. Hypertensive with SBP 160s otherwise vital signs remained stable. One febrile episode yesterday up to 102.7F at 2000. Remains on ventilation via tracheostomy and sedated on precedex. Vent settings currently on AC mode RR20/PEEP5/FiO2%30. Urology evaluated pt yesterday given penile edema; underwood catheter was exchanged due to paraphimosis. Currently pending placement LTAC vs nursing home, CM working with family.      Past Medical History:   Diagnosis Date    Arthritis     Atrial fibrillation     BPH (benign  prostatic hyperplasia)     Cardiac arrest     Coronary artery disease     Cyst, kidney, acquired     Diverticulosis     Hyperlipidemia     Hypertension     MI (myocardial infarction)     Obesity     Steatosis of liver      Past Surgical History:   Procedure Laterality Date    A-V CARDIAC PACEMAKER INSERTION Right     CARDIAC CATHETERIZATION      COLONOSCOPY W/ BIOPSIES      CRANIECTOMY Right 12/20/2023    Procedure: CRANIECTOMY;  Surgeon: Artem Can MD;  Location: [facility] OR;  Service: Neurosurgery;  Laterality: Right;    ESOPHAGOGASTRODUODENOSCOPY W/ PEG N/A 1/2/2024    Procedure: PEG;  Surgeon: Tani Day MD;  Location: [facility] ENDOSCOPY;  Service: Gastroenterology;  Laterality: N/A;    excision of colon      TRACHEOSTOMY N/A 12/29/2023    Procedure: CREATION, TRACHEOSTOMY;  Surgeon: Patricia Winslow MD;  Location: [facility] OR;  Service: ENT;  Laterality: N/A;  REQ 1130 //  NEEDS 2 SCRUBS     Social History     Socioeconomic History    Marital status:     Number of children: 9   Occupational History    Occupation: retired   Tobacco Use    Smoking status: Former    Smokeless tobacco: Never   Substance and Sexual Activity    Alcohol use: Not Currently    Drug use: Not Currently    Sexual activity: Not Currently     Partners: Female     Social Determinants of Health     Financial Resource Strain: Low Risk  (10/19/2022)    Overall Financial Resource Strain (CARDIA)     Difficulty of Paying Living Expenses: Not hard at all   Food Insecurity: No Food Insecurity (10/19/2022)    Hunger Vital Sign     Worried About Running Out of Food in the Last Year: Never true     Ran Out of Food in the Last Year: Never true   Transportation Needs: No Transportation Needs (10/19/2022)    PRAPARE - Transportation     Lack of Transportation (Medical): No     Lack of Transportation (Non-Medical): No   Physical Activity: Sufficiently Active (10/19/2022)    Exercise Vital Sign     Days of Exercise per Week: 6 days     Minutes  of Exercise per Session: 60 min   Stress: No Stress Concern Present (10/19/2022)    [employer] [employer] - Occupational Stress Questionnaire     Feeling of Stress : Not at all   Social Connections: Unknown (10/19/2022)    Social Connection and Isolation Panel [NHANES]     Frequency of Communication with Friends and Family: More than three times a week     Frequency of Social Gatherings with Friends and Family: More than three times a week     Attends Evangelical Services: More than 4 times per year     Active Member of Clubs or Organizations: No     Attends Club or Organization Meetings: Never   Housing Stability: Low Risk  (10/19/2022)    Housing Stability Vital Sign     Unable to Pay for Housing in the Last Year: No     Number of Places Lived in the Last Year: 1     Unstable Housing in the Last Year: No     Current Outpatient Medications   Medication Instructions    albuterol (PROVENTIL/VENTOLIN HFA) 90 mcg/actuation inhaler 2 puffs, Inhalation, Every 6 hours PRN, Rescue    aspirin 81 MG Chew chew and swallow 1 tablet by mouth daily    atorvastatin (LIPITOR) 40 mg, Oral, Daily    cetirizine (ZYRTEC) 10 mg, Oral, Daily    diltiaZEM (CARDIZEM CD) 360 mg, Oral, Daily    losartan (COZAAR) 50 mg, Oral, Daily    metoprolol succinate (TOPROL-XL) 200 mg, Oral, 2 times daily    omeprazole (PRILOSEC) 20 mg, Oral, Daily, One tab by mouth daily    potassium chloride (KLOR-CON) 20 mEq Pack 20 mEq, Oral, Daily    spironolactone (ALDACTONE) 50 mg, Oral, Daily    torsemide (DEMADEX) 10 mg, Oral, Daily    vitamin D (VITAMIN D3) 1,000 Units, Oral, Daily    XARELTO 20 mg Tab TAKE 1 TABLET BY MOUTH DAILY with SUPPER     Current Inpatient Medications   aspirin  81 mg Per G Tube Daily    atorvastatin  10 mg Per G Tube Daily    carvediloL  3.125 mg Per G Tube BID    diltiaZEM  240 mg Per G Tube Daily    docusate  100 mg Per G Tube BID    enoxparin  1 mg/kg Subcutaneous Q12H (treatment, non-standard time)    famotidine  (PF)  20 mg Intravenous Daily    levetiracetam  1,000 mg Per G Tube BID    losartan  50 mg Per G Tube Daily    mannitol 20%  75 g Intravenous Once    neomycin-bacitracin-polymyxin   Topical (Top) BID    QUEtiapine  25 mg Per G Tube BID     Current Intravenous Infusions   dexmedeTOMIDine (Precedex) infusion (titrating) 0.6 mcg/kg/hr (01/08/24 0245)    fentanyl Stopped (12/31/23 0634)    NORepinephrine bitartrate-D5W Stopped (12/20/23 1929)       Review of Systems   Unable to perform ROS: Critical illness   All other systems reviewed and are negative.     Objective:       Intake/Output Summary (Last 24 hours) at 1/8/2024 0458  Last data filed at 1/8/2024 0451  Gross per 24 hour   Intake 2828.38 ml   Output 4350 ml   Net -1521.62 ml     Vital Signs (Most Recent):  Temp: 98.6 °F (37 °C) (01/08/24 0400)  Pulse: 90 (01/08/24 0400)  Resp: 15 (01/08/24 0400)  BP: 118/73 (01/08/24 0400)  SpO2: 95 % (01/08/24 0400)  Body mass index is 36.88 kg/m².  Weight: 119.9 kg (264 lb 5.3 oz) Vital Signs (24h Range):  Temp:  [98.6 °F (37 °C)-102.7 °F (39.3 °C)] 98.6 °F (37 °C)  Pulse:  [] 90  Resp:  [0-30] 15  SpO2:  [91 %-99 %] 95 %  BP: ()/() 118/73     Physical Exam  General: No acute distress. Sleeping at this timee.  HEENT: Normocephalic, atraumatic. Face symmetric.    Cardiovascular: Regular rate & rhythm  Pulmonary: Bilateral symmetric chest rise, patient mechanically ventilated via trach  Abdominal:  non-distended, soft, round, positive bowel sounds  Extremities: No clubbing or cyanosis.  1+ pitting edema distal LUE  Neuro:   trached on th vent; sedated on precedex.  Patient has non purposeful movements of RUE, does not follow commands.      Lines/Drains/Airways       Drain  Duration                  Urethral Catheter 16 Fr. -- days         Gastrostomy/Enterostomy 01/02/24 1230 LUQ 5 days         Fecal Incontinence  01/07/24 0357 1 day              Airway  Duration             Adult Surgical Airway  "12/29/23 1229 9 days              Peripheral Intravenous Line  Duration                  Peripheral IV - Single Lumen 12/25/23 1705 20 G Anterior;Left;Proximal Forearm 13 days         Midline Catheter Insertion/Assessment  - Single Lumen 12/29/23 1400 Right cephalic vein (lateral side of arm) 9 days                    Significant Labs:  Lab Results   Component Value Date    WBC 11.50 01/08/2024    HGB 9.9 (L) 01/08/2024    HCT 30.8 (L) 01/08/2024    MCV 89.5 01/08/2024     01/08/2024       BMP  Lab Results   Component Value Date     01/08/2024    K 3.5 01/08/2024    CHLORIDE 103 01/08/2024    CO2 29 01/08/2024    BUN 13.5 01/08/2024    CREATININE 0.79 01/08/2024    CALCIUM 8.1 (L) 01/08/2024    AGAP 7.0 01/04/2024    EGFRNONAA 61 04/23/2022     ABG  No results for input(s): "PH", "PO2", "PCO2", "HCO3", "POCBASEDEF" in the last 168 hours.    Mechanical Ventilation Support:  Vent Mode: A/C (01/08/24 0040)  Set Rate: 14 BPM (01/08/24 0040)  Vt Set: 460 mL (01/08/24 0040)  Pressure Support: 10 cmH20 (01/04/24 2337)  PEEP/CPAP: 5 cmH20 (01/08/24 0040)  Oxygen Concentration (%): 30 (01/08/24 0040)  Peak Airway Pressure: 28 cmH20 (01/08/24 0040)  Total Ve: 12.4 L/m (01/08/24 0040)  F/VT Ratio<105 (RSBI): (!) 23.74 (01/08/24 0040)      Significant Imaging:  No imaging this AM      Assessment/Plan:     Assessment  CVA s/p right ICA and MCA M3  branch thrombectomy s/p craniectomy with hemorrhagic conversion  Superficial thrombosis in left cephalic vein  Confirmed by DVT U/S on 12/26/2023  Acute hypoxic respiratory failure requiring intubation  Atrial fibrillation w/ RVR-rate now controlled.  Onychomycosis  HX of CAD, HTN, HLD, ADA  Hypernatremia  Urine osmolality indicative of extrarenal process, likely 2/2 NG tube as well as lack of intake      Plan  - Free water flushes to PEG Currently at 210 ml q 4 hours. Initiate TF as tolerated.   -Continue efforts to wean sedation and mechanical ventilation wean with trach " as tolerated  -Bowel regimen (Miralax TID and Senna BID) stopped due to excessive number of BM   -Neurosurgery signed off on 1/3/24   -Bone flap precautions and continue helmet use out of bed   -Seizure precautions and continue Keppra BID via Gtube   -Will require outpatient referral from placement back to outpatient neurosurgery office for consideration of cranioplasty   -Gastroenterology signed off on 1/3/24   -Continue Tfs and PEG care   -Neurology signed off on 12/22/23   -SBP goal <160    -Continue ASA 81mg qd   -Cardiology signed off on 12/25/23   -Continue Diltiazem 240mg qd, Losartan and Metoprolol tartrate 100mg BID    -Lopressor IV PRN for HR>120   -Remains on full-dose Lovenox for left cephalic vein thrombosis  -Will need to discuss placement - LTAC vs nursing home. Will follow up with CM.   -Urology following given paraphimosis; appreciate their assistance    DVT ppx: FD Lovenox   GI ppx: Casey Barlow MD   Pulmonary Critical Care Medicine  [facility] - [facility] ICU  DOS: 01/08/2024

## 2024-01-09 LAB
ALBUMIN SERPL-MCNC: 2.3 G/DL (ref 3.4–4.8)
ALBUMIN/GLOB SERPL: 0.6 RATIO (ref 1.1–2)
ALP SERPL-CCNC: 53 UNIT/L (ref 40–150)
ALT SERPL-CCNC: 42 UNIT/L (ref 0–55)
AST SERPL-CCNC: 35 UNIT/L (ref 5–34)
BASOPHILS # BLD AUTO: 0.03 X10(3)/MCL
BASOPHILS NFR BLD AUTO: 0.4 %
BILIRUB SERPL-MCNC: 0.6 MG/DL
BUN SERPL-MCNC: 15.5 MG/DL (ref 8.4–25.7)
CALCIUM SERPL-MCNC: 8.2 MG/DL (ref 8.8–10)
CHLORIDE SERPL-SCNC: 105 MMOL/L (ref 98–107)
CO2 SERPL-SCNC: 31 MMOL/L (ref 23–31)
CREAT SERPL-MCNC: 0.82 MG/DL (ref 0.73–1.18)
EOSINOPHIL # BLD AUTO: 0.26 X10(3)/MCL (ref 0–0.9)
EOSINOPHIL NFR BLD AUTO: 3.2 %
ERYTHROCYTE [DISTWIDTH] IN BLOOD BY AUTOMATED COUNT: 14.7 % (ref 11.5–17)
GFR SERPLBLD CREATININE-BSD FMLA CKD-EPI: >60 MLS/MIN/1.73/M2
GLOBULIN SER-MCNC: 4.1 GM/DL (ref 2.4–3.5)
GLUCOSE SERPL-MCNC: 118 MG/DL (ref 82–115)
HCT VFR BLD AUTO: 32.8 % (ref 42–52)
HGB BLD-MCNC: 10.3 G/DL (ref 14–18)
IMM GRANULOCYTES # BLD AUTO: 0.05 X10(3)/MCL (ref 0–0.04)
IMM GRANULOCYTES NFR BLD AUTO: 0.6 %
LYMPHOCYTES # BLD AUTO: 1.62 X10(3)/MCL (ref 0.6–4.6)
LYMPHOCYTES NFR BLD AUTO: 19.9 %
MAGNESIUM SERPL-MCNC: 2.2 MG/DL (ref 1.6–2.6)
MCH RBC QN AUTO: 28.7 PG (ref 27–31)
MCHC RBC AUTO-ENTMCNC: 31.4 G/DL (ref 33–36)
MCV RBC AUTO: 91.4 FL (ref 80–94)
MONOCYTES # BLD AUTO: 0.69 X10(3)/MCL (ref 0.1–1.3)
MONOCYTES NFR BLD AUTO: 8.5 %
NEUTROPHILS # BLD AUTO: 5.49 X10(3)/MCL (ref 2.1–9.2)
NEUTROPHILS NFR BLD AUTO: 67.4 %
NRBC BLD AUTO-RTO: 0 %
PLATELET # BLD AUTO: 287 X10(3)/MCL (ref 130–400)
PMV BLD AUTO: 11.7 FL (ref 7.4–10.4)
POTASSIUM SERPL-SCNC: 3.5 MMOL/L (ref 3.5–5.1)
PROT SERPL-MCNC: 6.4 GM/DL (ref 5.8–7.6)
RBC # BLD AUTO: 3.59 X10(6)/MCL (ref 4.7–6.1)
SODIUM SERPL-SCNC: 144 MMOL/L (ref 136–145)
WBC # SPEC AUTO: 8.14 X10(3)/MCL (ref 4.5–11.5)

## 2024-01-09 PROCEDURE — 25000003 PHARM REV CODE 250: Performed by: NURSE PRACTITIONER

## 2024-01-09 PROCEDURE — 99900026 HC AIRWAY MAINTENANCE (STAT)

## 2024-01-09 PROCEDURE — 25000003 PHARM REV CODE 250: Performed by: STUDENT IN AN ORGANIZED HEALTH CARE EDUCATION/TRAINING PROGRAM

## 2024-01-09 PROCEDURE — 63600175 PHARM REV CODE 636 W HCPCS: Performed by: INTERNAL MEDICINE

## 2024-01-09 PROCEDURE — 94003 VENT MGMT INPAT SUBQ DAY: CPT

## 2024-01-09 PROCEDURE — 85025 COMPLETE CBC W/AUTO DIFF WBC: CPT

## 2024-01-09 PROCEDURE — 80053 COMPREHEN METABOLIC PANEL: CPT

## 2024-01-09 PROCEDURE — 99900035 HC TECH TIME PER 15 MIN (STAT)

## 2024-01-09 PROCEDURE — 94761 N-INVAS EAR/PLS OXIMETRY MLT: CPT

## 2024-01-09 PROCEDURE — 20000000 HC ICU ROOM

## 2024-01-09 PROCEDURE — 25000003 PHARM REV CODE 250: Performed by: INTERNAL MEDICINE

## 2024-01-09 PROCEDURE — 27100171 HC OXYGEN HIGH FLOW UP TO 24 HOURS

## 2024-01-09 PROCEDURE — 27200966 HC CLOSED SUCTION SYSTEM

## 2024-01-09 PROCEDURE — 83735 ASSAY OF MAGNESIUM: CPT

## 2024-01-09 RX ADMIN — DOCUSATE SODIUM 100 MG: 50 LIQUID ORAL at 09:01

## 2024-01-09 RX ADMIN — FAMOTIDINE 20 MG: 10 INJECTION, SOLUTION INTRAVENOUS at 10:01

## 2024-01-09 RX ADMIN — QUETIAPINE FUMARATE 25 MG: 25 TABLET ORAL at 10:01

## 2024-01-09 RX ADMIN — QUETIAPINE FUMARATE 25 MG: 25 TABLET ORAL at 09:01

## 2024-01-09 RX ADMIN — BACITRACIN ZINC, NEOMYCIN, POLYMYXIN B: 400; 3.5; 5 OINTMENT TOPICAL at 03:01

## 2024-01-09 RX ADMIN — DOCUSATE SODIUM 100 MG: 50 LIQUID ORAL at 10:01

## 2024-01-09 RX ADMIN — DEXMEDETOMIDINE HYDROCHLORIDE 0.6 MCG/KG/HR: 4 INJECTION INTRAVENOUS at 01:01

## 2024-01-09 RX ADMIN — CARVEDILOL 3.12 MG: 3.12 TABLET, FILM COATED ORAL at 10:01

## 2024-01-09 RX ADMIN — GUAIFENESIN 400 MG: 200 SOLUTION ORAL at 05:01

## 2024-01-09 RX ADMIN — CARVEDILOL 3.12 MG: 3.12 TABLET, FILM COATED ORAL at 09:01

## 2024-01-09 RX ADMIN — ATORVASTATIN CALCIUM 10 MG: 10 TABLET, FILM COATED ORAL at 10:01

## 2024-01-09 RX ADMIN — LEVETIRACETAM 1000 MG: 100 SOLUTION ORAL at 10:01

## 2024-01-09 RX ADMIN — GUAIFENESIN 400 MG: 200 SOLUTION ORAL at 03:01

## 2024-01-09 RX ADMIN — DEXMEDETOMIDINE HYDROCHLORIDE 0.6 MCG/KG/HR: 4 INJECTION INTRAVENOUS at 09:01

## 2024-01-09 RX ADMIN — DEXMEDETOMIDINE HYDROCHLORIDE 0.6 MCG/KG/HR: 4 INJECTION INTRAVENOUS at 03:01

## 2024-01-09 RX ADMIN — GUAIFENESIN 400 MG: 200 SOLUTION ORAL at 09:01

## 2024-01-09 RX ADMIN — LOSARTAN POTASSIUM 50 MG: 50 TABLET, FILM COATED ORAL at 10:01

## 2024-01-09 RX ADMIN — DILTIAZEM HYDROCHLORIDE 240 MG: 60 TABLET, FILM COATED ORAL at 10:01

## 2024-01-09 RX ADMIN — LEVETIRACETAM 1000 MG: 100 SOLUTION ORAL at 09:01

## 2024-01-09 RX ADMIN — ENOXAPARIN SODIUM 120 MG: 150 INJECTION SUBCUTANEOUS at 10:01

## 2024-01-09 RX ADMIN — ENOXAPARIN SODIUM 120 MG: 150 INJECTION SUBCUTANEOUS at 09:01

## 2024-01-09 RX ADMIN — ASPIRIN 81 MG CHEWABLE TABLET 81 MG: 81 TABLET CHEWABLE at 10:01

## 2024-01-09 RX ADMIN — BACITRACIN ZINC, NEOMYCIN, POLYMYXIN B: 400; 3.5; 5 OINTMENT TOPICAL at 09:01

## 2024-01-09 RX ADMIN — DEXMEDETOMIDINE HYDROCHLORIDE 0.6 MCG/KG/HR: 4 INJECTION INTRAVENOUS at 07:01

## 2024-01-09 NOTE — NURSING
Nurses Note -- 4 Eyes      1/9/2024   5:26 PM      Skin assessed during: Daily Assessment      [x] No Altered Skin Integrity Present    [x]Prevention Measures Documented      [] Yes- Altered Skin Integrity Present or Discovered   [] LDA Added if Not in Epic (Describe Wound)   [] New Altered Skin Integrity was Present on Admit and Documented in LDA   [] Wound Image Taken    Wound Care Consulted? No    Attending Nurse:  Jesus Johnson RN/Staff Member:  Diane SALES

## 2024-01-09 NOTE — PROGRESS NOTES
Inpatient Nutrition Assessment    Admit Date: 12/19/2023   Total duration of encounter: 21 days   Patient Age: 68 y.o.    Nutrition Recommendation/Prescription     Tube feeding recommendation:    Impact Peptide 1.5 goal rate 50 ml/hr +2 packets ProSource TF20 daily to provide  1660 kcal/d (108% est needs)  134 g protein/d (85% est needs)  770 ml free water/d (28% est needs)  (calculations based on estimated 20 hr/d run time)     With free water flushes of 210 q4hr, total water: 2030kcal (75% est needs)    Communication of Recommendations: reviewed with nurse    Nutrition Assessment     Malnutrition Assessment/Nutrition-Focused Physical Exam    Malnutrition Context: acute illness or injury (12/21/23 1406)  Malnutrition Level:  (does not meet criteria) (12/21/23 1406)  Energy Intake (Malnutrition):  (unable to eval) (12/21/23 1406)  Weight Loss (Malnutrition):  (unable to eval) (12/21/23 1406)  Subcutaneous Fat (Malnutrition):  (does not meet criteria) (12/21/23 1406)           Muscle Mass (Malnutrition):  (does not meet criteria) (12/21/23 1406)                          Fluid Accumulation (Malnutrition):  (does not meet criteria) (12/21/23 1406)        A minimum of two characteristics is recommended for diagnosis of either severe or non-severe malnutrition.    Chart Review    Reason Seen: continuous nutrition monitoring and follow-up    Malnutrition Screening Tool Results   Have you recently lost weight without trying?: No  Have you been eating poorly because of a decreased appetite?: No   MST Score: 0   Diagnosis:  CVA s/p right ICA and MCA M3  branch thrombectomy s/p craniectomy with hemorrhagic conversion  Acute hypoxic respiratory failure  Atrial fibrillation    Relevant Medical History: Afib, HTN, CAD, CAD (STEMI 2003), HFpEF     Scheduled Medications:  aspirin, 81 mg, Daily  atorvastatin, 10 mg, Daily  carvediloL, 3.125 mg, BID  diltiaZEM, 240 mg, Daily  docusate, 100 mg, BID  enoxparin, 1 mg/kg, Q12H  (treatment, non-standard time)  famotidine (PF), 20 mg, Daily  guaiFENesin 100 mg/5 ml, 400 mg, Q8H  levetiracetam, 1,000 mg, BID  losartan, 50 mg, Daily  mannitol 20%, 75 g, Once  neomycin-bacitracin-polymyxin, , BID  QUEtiapine, 25 mg, BID    Continuous Infusions:  dexmedeTOMIDine (Precedex) infusion (titrating), Last Rate: 0.6 mcg/kg/hr (01/09/24 1224)  fentanyl, Last Rate: Stopped (12/31/23 0634)  NORepinephrine bitartrate-D5W, Last Rate: Stopped (12/20/23 6479)    PRN Medications: 0.9%  NaCl infusion (for blood administration), acetaminophen, dextrose 10%, dextrose 10%, fentaNYL, glucagon (human recombinant), hydrALAZINE, insulin aspart U-100, ipratropium, labetalol, levalbuterol, methyl salicylate, metoprolol, ondansetron, polyethylene glycol, sodium chloride 0.9%    Calorie Containing IV Medications: no significant kcals from medications at this time    Recent Labs   Lab 01/03/24  0717 01/04/24  0125 01/06/24  0142 01/07/24  0145 01/08/24  0226 01/09/24  0159   *   < > 143 141 140 144   K 3.6   < > 3.6 3.7 3.5 3.5   CALCIUM 7.9*   < > 8.2* 8.3* 8.1* 8.2*   PHOS 3.3  --   --   --   --   --    MG 2.20   < > 2.10 2.10 2.10 2.20   CHLORIDE 111*   < > 108* 106 103 105   CO2 31   < > 28 29 29 31   BUN 16.5   < > 20.2 17.9 13.5 15.5   CREATININE 0.81   < > 0.82 0.79 0.79 0.82   EGFRNORACEVR >60   < > >60 >60 >60 >60   GLUCOSE 116*   < > 125* 107 117* 118*   BILITOT 0.7   < > 0.5 0.5 0.5 0.6   ALKPHOS 58   < > 55 50 48 53   ALT 46   < > 33 44 40 42   AST 34   < > 31 41* 37* 35*   ALBUMIN 2.2*   < > 2.1* 2.2* 2.2* 2.3*   WBC 8.42   < > 7.05 7.73 11.50 8.14   HGB 10.6*   < > 11.2* 10.5* 9.9* 10.3*   HCT 33.5*   < > 35.2* 34.3* 30.8* 32.8*    < > = values in this interval not displayed.        Nutrition Orders:  Diet NPO      Appetite/Oral Intake: NPO/not applicable  Factors Affecting Nutritional Intake: on mechanical ventilation and tracheostomy  Food/Mormonism/Cultural Preferences: unable to obtain  Food  "Allergies: none reported  Last Bowel Movement: 01/06/24  Wound(s):  incision noted    Comments    12/21/23: Discussed with RN. Will provide tube feeding recommendations for when appropriate to start tube feeding. Receiving kcal from meds.      12/22/23: Patient remains intubated, receiving kcal from meds. Cleviprex d/c'ed this morning, Diprivan continues. Patient currently with OG tube to LIS.     12/23/23: Pt with significant output via NG tube. Propofol infusion increased and Precedex started. Will leave TPN recommendations if Tube feeds are unable to be initiated by tomorrow.     12/26/23: TF continues, tolerated per RN. Noted no longer receiving kcal from meds. Will update goal rate to continue to meet est needs.     12/29/23: TF on hold for trach placement today. Pt recently returned, NG to be placed per RN. Once pt weaned off vent, will update TF and est needs.     1/2/24: TF on hold for PEG placement today. Plans to restart TF. Will update FWF to more closely meet est fluid needs.     1/5/24: TF continues @ goal rate. No kcal from meds.     1/9/24: TF on hold. Pt continues to cough and TF coming up. Discussed possibly needing reglan with RN? Will also change to different formula in order to not have such a high goal rate.     Anthropometrics    Height: 5' 10.98" (180.3 cm),    Last Weight: 119.9 kg (264 lb 5.3 oz) (12/29/23 0541), Weight Method: Bed Scale  BMI (Calculated): 36.9  BMI Classification: obese grade I (BMI 30-34.9)        Ideal Body Weight (IBW), Male: 171.88 lb     % Ideal Body Weight, Male (lb): 140.99 %                          Usual Weight Provided By: unable to obtain usual weight    Wt Readings from Last 5 Encounters:   12/29/23 119.9 kg (264 lb 5.3 oz)   12/11/23 112.7 kg (248 lb 7.3 oz)   12/05/23 112.3 kg (247 lb 9.6 oz)   11/07/23 109.5 kg (241 lb 6.5 oz)   10/30/23 113.9 kg (251 lb 1.7 oz)     Weight Change(s) Since Admission:   Wt Readings from Last 1 Encounters:   12/29/23 0541 119.9 " kg (264 lb 5.3 oz)   12/28/23 0600 116.9 kg (257 lb 11.5 oz)   12/27/23 0600 116.9 kg (257 lb 11.5 oz)   12/26/23 0600 116.1 kg (255 lb 15.3 oz)   12/24/23 0544 111.1 kg (244 lb 14.9 oz)   12/19/23 1645 110 kg (242 lb 8.1 oz)   12/19/23 1053 110 kg (242 lb 8.1 oz)   Admit Weight: 110 kg (242 lb 8.1 oz) (12/19/23 1053), Weight Method: Bed Scale    Estimated Needs    Weight Used For Calorie Calculations: 110 kg (242 lb 8.1 oz)  Energy Calorie Requirements (kcal): 1210-1540kcal (11-14kcal/kg)  Energy Need Method: Kcal/kg  Weight Used For Protein Calculations: 78.2 kg (172 lb 6.4 oz) (IBW)  Protein Requirements: 157gm (2g/kg IBW)  Fluid Requirements (mL): 2750mL (25mL/kg CBW) or per MD    Enteral Nutrition     (On hold)  Formula: Peptamen Intense VHP  Rate/Volume: 75ml/hr  Water Flushes: 210ml q4hr  Additives/Modulars: none at this time  Route: nasogastric tube  Method: continuous  Total Nutrition Provided by Tube Feeding, Additives, and Flushes:  Calories Provided  1500 kcal/d, 97% needs   Protein Provided  139 g/d, 89% needs   Fluid Provided  2520 ml/d, 92% needs   Continuous feeding calculations based on estimated 20 hr/d run time unless otherwise stated.    Parenteral Nutrition     Patient not receiving parenteral nutrition support at this time.    Evaluation of Received Nutrient Intake    Calories: not meeting estimated needs  Protein: not meeting estimated needs    Patient Education     Not applicable.    Nutrition Diagnosis     PES: Inadequate oral intake related to acute illness as evidenced by intubation/trach since 12/19/23. (active)     Nutrition Interventions     Intervention(s): collaboration with other providers    Goal: Meet greater than 80% of nutritional needs by follow-up. (goal progressing)  Goal: Tolerate enteral feeding at goal rate by follow-up. (goal progressing)    Nutrition Goals & Monitoring     Dietitian will monitor: energy intake    Nutrition Risk/Follow-Up: high (follow-up in 1-4 days)    Please consult if re-assessment needed sooner.

## 2024-01-09 NOTE — PROGRESS NOTES
UROLOGY  PROGRESS  NOTE    Delio Daniel Jr. 1955  [id number]  1/9/2024      600ml UOP overnight  Bun/Cr 15 5/0.82  WBC 8.14  H&H 10.3/32.8    Intake/Output:  I/O this shift:  In: 390.4 [I.V.:330.4; NG/GT:60]  Out: 275 [Urine:275]  I/O last 3 completed shifts:  In: 410.7 [I.V.:235.5; IV Piggyback:175.2]  Out: 3100 [Urine:2300; Stool:800]     Exam:    NAD  Card: RRR  Resp: on mechanical ventilation via tracheostomy  : yellow urine draining to  bag. Some generalized penile inflammation/swelling slightly improved today, paraphimosis remains reduce  Extremity: no C/C/E    Recent Results (from the past 24 hour(s))   Comprehensive Metabolic Panel    Collection Time: 01/09/24  1:59 AM   Result Value Ref Range    Sodium Level 144 136 - 145 mmol/L    Potassium Level 3.5 3.5 - 5.1 mmol/L    Chloride 105 98 - 107 mmol/L    Carbon Dioxide 31 23 - 31 mmol/L    Glucose Level 118 (H) 82 - 115 mg/dL    Blood Urea Nitrogen 15.5 8.4 - 25.7 mg/dL    Creatinine 0.82 0.73 - 1.18 mg/dL    Calcium Level Total 8.2 (L) 8.8 - 10.0 mg/dL    Protein Total 6.4 5.8 - 7.6 gm/dL    Albumin Level 2.3 (L) 3.4 - 4.8 g/dL    Globulin 4.1 (H) 2.4 - 3.5 gm/dL    Albumin/Globulin Ratio 0.6 (L) 1.1 - 2.0 ratio    Bilirubin Total 0.6 <=1.5 mg/dL    Alkaline Phosphatase 53 40 - 150 unit/L    Alanine Aminotransferase 42 0 - 55 unit/L    Aspartate Aminotransferase 35 (H) 5 - 34 unit/L    eGFR >60 mls/min/1.73/m2   Magnesium    Collection Time: 01/09/24  1:59 AM   Result Value Ref Range    Magnesium Level 2.20 1.60 - 2.60 mg/dL   CBC with Differential    Collection Time: 01/09/24  1:59 AM   Result Value Ref Range    WBC 8.14 4.50 - 11.50 x10(3)/mcL    RBC 3.59 (L) 4.70 - 6.10 x10(6)/mcL    Hgb 10.3 (L) 14.0 - 18.0 g/dL    Hct 32.8 (L) 42.0 - 52.0 %    MCV 91.4 80.0 - 94.0 fL    MCH 28.7 27.0 - 31.0 pg    MCHC 31.4 (L) 33.0 - 36.0 g/dL    RDW 14.7 11.5 - 17.0 %    Platelet 287 130 - 400 x10(3)/mcL    MPV 11.7 (H) 7.4 - 10.4 fL    Neut % 67.4 %    Lymph %  19.9 %    Mono % 8.5 %    Eos % 3.2 %    Basophil % 0.4 %    Lymph # 1.62 0.6 - 4.6 x10(3)/mcL    Neut # 5.49 2.1 - 9.2 x10(3)/mcL    Mono # 0.69 0.1 - 1.3 x10(3)/mcL    Eos # 0.26 0 - 0.9 x10(3)/mcL    Baso # 0.03 <=0.2 x10(3)/mcL    IG# 0.05 (H) 0 - 0.04 x10(3)/mcL    IG% 0.6 %    NRBC% 0.0 %       Assessment:  -paraphimosis s/p manual reduction    Plan:  -Continue antibiotic ointment with underwood care; underwood exchanged 1/7/2023  -elevation/ice   -No additional recs at this time. Please call as needed with any issues.       Manisha Johnson NP

## 2024-01-09 NOTE — PROGRESS NOTES
[facility] - [facility] ICU  Pulmonary Critical Care Note    Patient Name: Delio Daniel Jr.  MRN: [id number]  Admission Date: 12/19/2023  Hospital Length of Stay: 21 days  Code Status: Full Code  Attending Provider: Efrain Salcido MD  Primary Care Provider: Candy, Primary Doctor     Subjective:     HPI:   Delio Daniel Jr. is a 68 y.o. 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 His last known normal was 9:15 per EMS. Stroke protocol in ED initiated. Unofficial CT head showed possible dense R MCA. Pt taken to cath lab for BRAD thrombectomy. Admitted to ICU for post-operative care and monitoring.     Hospital Course/Significant events:  12/19/2023 - Admitted to ICU s/p right internal carotid artery thrombectomy  12/20/2023 - 12/20/2023 he had a rapid deterioration in his neurologic status with CT showing, as expected, a large right MCA distribution infarct with marked right-to-left shift. s/p craniectomy  12/29/23 - s/p tracheotomy  1/2/2024- PEG tube placed   1/7/23 - Catheter exchanged    24 Hour Interval History:  No acute events over night. Hypertensive with SBP 160s otherwise vital signs remained stable; afebrile. Remains on ventilation via tracheostomy and sedated on precedex. Vent settings currently on AC mode RR14/PEEP5/FiO2%30. Currently pending placement, awaiting approval.     Past Medical History:   Diagnosis Date    Arthritis     Atrial fibrillation     BPH (benign prostatic hyperplasia)     Cardiac arrest     Coronary artery disease     Cyst, kidney, acquired     Diverticulosis     Hyperlipidemia     Hypertension     MI (myocardial  infarction)     Obesity     Steatosis of liver      Past Surgical History:   Procedure Laterality Date    A-V CARDIAC PACEMAKER INSERTION Right     CARDIAC CATHETERIZATION      COLONOSCOPY W/ BIOPSIES      CRANIECTOMY Right 12/20/2023    Procedure: CRANIECTOMY;  Surgeon: Artem Can MD;  Location: [facility] OR;  Service: Neurosurgery;  Laterality: Right;    ESOPHAGOGASTRODUODENOSCOPY W/ PEG N/A 1/2/2024    Procedure: PEG;  Surgeon: Tani Day MD;  Location: [facility] ENDOSCOPY;  Service: Gastroenterology;  Laterality: N/A;    excision of colon      TRACHEOSTOMY N/A 12/29/2023    Procedure: CREATION, TRACHEOSTOMY;  Surgeon: Patricia Winslow MD;  Location: [facility] OR;  Service: ENT;  Laterality: N/A;  REQ 1130 //  NEEDS 2 SCRUBS     Social History     Socioeconomic History    Marital status:     Number of children: 9   Occupational History    Occupation: retired   Tobacco Use    Smoking status: Former    Smokeless tobacco: Never   Substance and Sexual Activity    Alcohol use: Not Currently    Drug use: Not Currently    Sexual activity: Not Currently     Partners: Female     Social Determinants of Health     Financial Resource Strain: Low Risk  (10/19/2022)    Overall Financial Resource Strain (CARDIA)     Difficulty of Paying Living Expenses: Not hard at all   Food Insecurity: No Food Insecurity (10/19/2022)    Hunger Vital Sign     Worried About Running Out of Food in the Last Year: Never true     Ran Out of Food in the Last Year: Never true   Transportation Needs: No Transportation Needs (10/19/2022)    PRAPARE - Transportation     Lack of Transportation (Medical): No     Lack of Transportation (Non-Medical): No   Physical Activity: Sufficiently Active (10/19/2022)    Exercise Vital Sign     Days of Exercise per Week: 6 days     Minutes of Exercise per Session: 60 min   Stress: No Stress Concern Present (10/19/2022)    [employer] - Occupational Stress Questionnaire      Feeling of Stress : Not at all   Social Connections: Unknown (10/19/2022)    Social Connection and Isolation Panel [NHANES]     Frequency of Communication with Friends and Family: More than three times a week     Frequency of Social Gatherings with Friends and Family: More than three times a week     Attends Mandaeism Services: More than 4 times per year     Active Member of Clubs or Organizations: No     Attends Club or Organization Meetings: Never   Housing Stability: Low Risk  (10/19/2022)    Housing Stability Vital Sign     Unable to Pay for Housing in the Last Year: No     Number of Places Lived in the Last Year: 1     Unstable Housing in the Last Year: No     Current Outpatient Medications   Medication Instructions    albuterol (PROVENTIL/VENTOLIN HFA) 90 mcg/actuation inhaler 2 puffs, Inhalation, Every 6 hours PRN, Rescue    aspirin 81 MG Chew chew and swallow 1 tablet by mouth daily    atorvastatin (LIPITOR) 40 mg, Oral, Daily    cetirizine (ZYRTEC) 10 mg, Oral, Daily    diltiaZEM (CARDIZEM CD) 360 mg, Oral, Daily    losartan (COZAAR) 50 mg, Oral, Daily    metoprolol succinate (TOPROL-XL) 200 mg, Oral, 2 times daily    omeprazole (PRILOSEC) 20 mg, Oral, Daily, One tab by mouth daily    potassium chloride (KLOR-CON) 20 mEq Pack 20 mEq, Oral, Daily    spironolactone (ALDACTONE) 50 mg, Oral, Daily    torsemide (DEMADEX) 10 mg, Oral, Daily    vitamin D (VITAMIN D3) 1,000 Units, Oral, Daily    XARELTO 20 mg Tab TAKE 1 TABLET BY MOUTH DAILY with SUPPER     Current Inpatient Medications   aspirin  81 mg Per G Tube Daily    atorvastatin  10 mg Per G Tube Daily    carvediloL  3.125 mg Per G Tube BID    diltiaZEM  240 mg Per G Tube Daily    docusate  100 mg Per G Tube BID    enoxparin  1 mg/kg Subcutaneous Q12H (treatment, non-standard time)    famotidine (PF)  20 mg Intravenous Daily    guaiFENesin 100 mg/5 ml  400 mg Oral Q8H    levetiracetam  1,000 mg Per G Tube BID    losartan  50 mg Per G Tube Daily    mannitol  20%  75 g Intravenous Once    neomycin-bacitracin-polymyxin   Topical (Top) BID    QUEtiapine  25 mg Per G Tube BID     Current Intravenous Infusions   dexmedeTOMIDine (Precedex) infusion (titrating) 0.6 mcg/kg/hr (01/09/24 0135)    fentanyl Stopped (12/31/23 0634)    NORepinephrine bitartrate-D5W Stopped (12/20/23 1929)       Review of Systems   Unable to perform ROS: Critical illness   All other systems reviewed and are negative.     Objective:       Intake/Output Summary (Last 24 hours) at 1/9/2024 0404  Last data filed at 1/8/2024 1700  Gross per 24 hour   Intake 303.74 ml   Output 2100 ml   Net -1796.26 ml     Vital Signs (Most Recent):  Temp: 98.6 °F (37 °C) (01/09/24 0000)  Pulse: 78 (01/09/24 0300)  Resp: (!) 22 (01/09/24 0300)  BP: (!) 150/100 (01/09/24 0300)  SpO2: 96 % (01/09/24 0300)  Body mass index is 36.88 kg/m².  Weight: 119.9 kg (264 lb 5.3 oz) Vital Signs (24h Range):  Temp:  [98.6 °F (37 °C)-99.4 °F (37.4 °C)] 98.6 °F (37 °C)  Pulse:  [] 78  Resp:  [3-25] 22  SpO2:  [92 %-100 %] 96 %  BP: (111-171)/() 150/100     Physical Exam  General: No acute distress. Sleeping at this timee.  HEENT: Normocephalic, atraumatic. Face symmetric.    Cardiovascular: Regular rate & rhythm  Pulmonary: Bilateral symmetric chest rise, patient mechanically ventilated via trach  Abdominal:  non-distended, soft, round, positive bowel sounds  Extremities: No clubbing or cyanosis.  1+ pitting edema distal LUE  Neuro:   trached on th vent; sedated on precedex.  Patient has non purposeful movements of RUE, does not follow commands.      Lines/Drains/Airways       Drain  Duration                  Urethral Catheter 16 Fr. -- days         Gastrostomy/Enterostomy 01/02/24 1230 LUQ 6 days         Fecal Incontinence  01/07/24 0357 2 days              Airway  Duration             Adult Surgical Airway 12/29/23 1229 10 days              Peripheral Intravenous Line  Duration                  Peripheral IV - Single  "Lumen 12/25/23 1705 20 G Anterior;Left;Proximal Forearm 14 days         Midline Catheter Insertion/Assessment  - Single Lumen 12/29/23 1400 Right cephalic vein (lateral side of arm) 10 days                    Significant Labs:  Lab Results   Component Value Date    WBC 8.14 01/09/2024    HGB 10.3 (L) 01/09/2024    HCT 32.8 (L) 01/09/2024    MCV 91.4 01/09/2024     01/09/2024       BMP  Lab Results   Component Value Date     01/09/2024    K 3.5 01/09/2024    CHLORIDE 105 01/09/2024    CO2 31 01/09/2024    BUN 15.5 01/09/2024    CREATININE 0.82 01/09/2024    CALCIUM 8.2 (L) 01/09/2024    AGAP 7.0 01/04/2024    EGFRNONAA 61 04/23/2022     ABG  No results for input(s): "PH", "PO2", "PCO2", "HCO3", "POCBASEDEF" in the last 168 hours.    Mechanical Ventilation Support:  Vent Mode: A/C (01/09/24 0216)  Set Rate: 14 BPM (01/09/24 0216)  Vt Set: 460 mL (01/09/24 0216)  Pressure Support: 10 cmH20 (01/04/24 2337)  PEEP/CPAP: 5 cmH20 (01/09/24 0216)  Oxygen Concentration (%): 30 (01/09/24 0216)  Peak Airway Pressure: 24 cmH20 (01/09/24 0216)  Total Ve: 9.7 L/m (01/09/24 0216)  F/VT Ratio<105 (RSBI): (!) 40.44 (01/09/24 0216)      Significant Imaging:  No imaging this AM      Assessment/Plan:     Assessment  CVA s/p right ICA and MCA M3  branch thrombectomy s/p craniectomy with hemorrhagic conversion  Superficial thrombosis in left cephalic vein  Confirmed by DVT U/S on 12/26/2023  Acute hypoxic respiratory failure requiring intubation  Atrial fibrillation w/ RVR-rate now controlled.  Onychomycosis  HX of CAD, HTN, HLD, ADA  Hypernatremia  Urine osmolality indicative of extrarenal process, likely 2/2 NG tube as well as lack of intake      Plan  - Free water flushes to PEG Currently at 210 ml q 4 hours. Initiate TF as tolerated.   -Continue efforts to wean sedation and mechanical ventilation wean with trach as tolerated  -Bowel regimen (Miralax TID and Senna BID) stopped due to excessive number of BM   -Neurosurgery " signed off on 1/3/24   -Bone flap precautions and continue helmet use out of bed   -Seizure precautions and continue Keppra BID via Gtube   -Will require outpatient referral from placement back to outpatient neurosurgery office for consideration of cranioplasty   -Gastroenterology signed off on 1/3/24   -Continue Tfs and PEG care   -Neurology signed off on 12/22/23   -SBP goal <160    -Continue ASA 81mg qd   -Cardiology signed off on 12/25/23   -Continue Diltiazem 240mg qd, Losartan and Metoprolol tartrate 100mg BID    -Lopressor IV PRN for HR>120   -Remains on full-dose Lovenox for left cephalic vein thrombosis  -Will need to discuss placement - LTAC vs nursing home. Will follow up with CM.   -Urology following given paraphimosis; appreciate their assistance   -Continue antibiotic ointment with underwood care  -elevation/ice    DVT ppx: FD Lovenox   GI ppx: Casey Barlow MD   Pulmonary Critical Care Medicine  [facility] - [facility] ICU  DOS: 01/09/2024

## 2024-01-10 LAB
ALBUMIN SERPL-MCNC: 2.1 G/DL (ref 3.4–4.8)
ALBUMIN/GLOB SERPL: 0.5 RATIO (ref 1.1–2)
ALLENS TEST BLOOD GAS (OHS): ABNORMAL
ALP SERPL-CCNC: 47 UNIT/L (ref 40–150)
ALT SERPL-CCNC: 36 UNIT/L (ref 0–55)
AST SERPL-CCNC: 35 UNIT/L (ref 5–34)
BASE EXCESS BLD CALC-SCNC: 11.7 MMOL/L
BASOPHILS # BLD AUTO: 0.08 X10(3)/MCL
BASOPHILS NFR BLD AUTO: 1.1 %
BILIRUB SERPL-MCNC: 0.5 MG/DL
BLOOD GAS SAMPLE TYPE (OHS): ABNORMAL
BUN SERPL-MCNC: 14.5 MG/DL (ref 8.4–25.7)
CA-I BLD-SCNC: 1.09 MMOL/L (ref 1.12–1.23)
CALCIUM SERPL-MCNC: 7.9 MG/DL (ref 8.8–10)
CHLORIDE SERPL-SCNC: 105 MMOL/L (ref 98–107)
CO2 BLDA-SCNC: 37.3 MMOL/L
CO2 SERPL-SCNC: 30 MMOL/L (ref 23–31)
CREAT SERPL-MCNC: 0.77 MG/DL (ref 0.73–1.18)
DRAWN BY BLOOD GAS (OHS): ABNORMAL
EOSINOPHIL # BLD AUTO: 0.55 X10(3)/MCL (ref 0–0.9)
EOSINOPHIL NFR BLD AUTO: 7.6 %
ERYTHROCYTE [DISTWIDTH] IN BLOOD BY AUTOMATED COUNT: 14.6 % (ref 11.5–17)
GFR SERPLBLD CREATININE-BSD FMLA CKD-EPI: >60 MLS/MIN/1.73/M2
GLOBULIN SER-MCNC: 4.2 GM/DL (ref 2.4–3.5)
GLUCOSE SERPL-MCNC: 117 MG/DL (ref 82–115)
HCO3 BLDA-SCNC: 35.9 MMOL/L (ref 22–26)
HCT VFR BLD AUTO: 35 % (ref 42–52)
HGB BLD-MCNC: 10.9 G/DL (ref 14–18)
IMM GRANULOCYTES # BLD AUTO: 0.18 X10(3)/MCL (ref 0–0.04)
IMM GRANULOCYTES NFR BLD AUTO: 2.5 %
INHALED O2 CONCENTRATION: 30 %
LYMPHOCYTES # BLD AUTO: 1.65 X10(3)/MCL (ref 0.6–4.6)
LYMPHOCYTES NFR BLD AUTO: 22.7 %
MAGNESIUM SERPL-MCNC: 2.1 MG/DL (ref 1.6–2.6)
MCH RBC QN AUTO: 28.6 PG (ref 27–31)
MCHC RBC AUTO-ENTMCNC: 31.1 G/DL (ref 33–36)
MCV RBC AUTO: 91.9 FL (ref 80–94)
MECH RR (OHS): 14 B/MIN
MODE (OHS): AC
MONOCYTES # BLD AUTO: 0.77 X10(3)/MCL (ref 0.1–1.3)
MONOCYTES NFR BLD AUTO: 10.6 %
NEUTROPHILS # BLD AUTO: 4.03 X10(3)/MCL (ref 2.1–9.2)
NEUTROPHILS NFR BLD AUTO: 55.5 %
NRBC BLD AUTO-RTO: 0.3 %
OXYGEN DEVICE BLOOD GAS (OHS): ABNORMAL
PCO2 BLDA: 44 MMHG (ref 35–45)
PEEP RESPIRATORY: 5 CMH2O
PH BLDA: 7.52 [PH] (ref 7.35–7.45)
PLATELET # BLD AUTO: 246 X10(3)/MCL (ref 130–400)
PMV BLD AUTO: 11.1 FL (ref 7.4–10.4)
PO2 BLDA: 77 MMHG (ref 80–100)
POTASSIUM BLOOD GAS (OHS): 3.1 MMOL/L (ref 3.5–5)
POTASSIUM SERPL-SCNC: 3.9 MMOL/L (ref 3.5–5.1)
PROT SERPL-MCNC: 6.3 GM/DL (ref 5.8–7.6)
RBC # BLD AUTO: 3.81 X10(6)/MCL (ref 4.7–6.1)
SAMPLE SITE BLOOD GAS (OHS): ABNORMAL
SAO2 % BLDA: 97 %
SODIUM BLOOD GAS (OHS): 138 MMOL/L (ref 137–145)
SODIUM SERPL-SCNC: 143 MMOL/L (ref 136–145)
SPONT+MECH VT ON VENT: 460 ML
WBC # SPEC AUTO: 7.26 X10(3)/MCL (ref 4.5–11.5)

## 2024-01-10 PROCEDURE — 83735 ASSAY OF MAGNESIUM: CPT

## 2024-01-10 PROCEDURE — 99900035 HC TECH TIME PER 15 MIN (STAT)

## 2024-01-10 PROCEDURE — 25000003 PHARM REV CODE 250: Performed by: INTERNAL MEDICINE

## 2024-01-10 PROCEDURE — 27100171 HC OXYGEN HIGH FLOW UP TO 24 HOURS

## 2024-01-10 PROCEDURE — 63600175 PHARM REV CODE 636 W HCPCS: Performed by: INTERNAL MEDICINE

## 2024-01-10 PROCEDURE — 25000003 PHARM REV CODE 250: Performed by: NURSE PRACTITIONER

## 2024-01-10 PROCEDURE — 80053 COMPREHEN METABOLIC PANEL: CPT

## 2024-01-10 PROCEDURE — 85025 COMPLETE CBC W/AUTO DIFF WBC: CPT

## 2024-01-10 PROCEDURE — 25000003 PHARM REV CODE 250: Performed by: STUDENT IN AN ORGANIZED HEALTH CARE EDUCATION/TRAINING PROGRAM

## 2024-01-10 PROCEDURE — 82803 BLOOD GASES ANY COMBINATION: CPT

## 2024-01-10 PROCEDURE — 94003 VENT MGMT INPAT SUBQ DAY: CPT

## 2024-01-10 PROCEDURE — 94761 N-INVAS EAR/PLS OXIMETRY MLT: CPT | Mod: XB

## 2024-01-10 PROCEDURE — 36600 WITHDRAWAL OF ARTERIAL BLOOD: CPT

## 2024-01-10 PROCEDURE — 20000000 HC ICU ROOM

## 2024-01-10 PROCEDURE — 99900026 HC AIRWAY MAINTENANCE (STAT)

## 2024-01-10 RX ORDER — GUAIFENESIN 100 MG/5ML
400 SOLUTION ORAL EVERY 8 HOURS
Status: DISCONTINUED | OUTPATIENT
Start: 2024-01-10 | End: 2024-01-16 | Stop reason: HOSPADM

## 2024-01-10 RX ADMIN — ENOXAPARIN SODIUM 120 MG: 150 INJECTION SUBCUTANEOUS at 08:01

## 2024-01-10 RX ADMIN — BACITRACIN ZINC, NEOMYCIN, POLYMYXIN B: 400; 3.5; 5 OINTMENT TOPICAL at 08:01

## 2024-01-10 RX ADMIN — DOCUSATE SODIUM 100 MG: 50 LIQUID ORAL at 08:01

## 2024-01-10 RX ADMIN — ATORVASTATIN CALCIUM 10 MG: 10 TABLET, FILM COATED ORAL at 08:01

## 2024-01-10 RX ADMIN — QUETIAPINE FUMARATE 25 MG: 25 TABLET ORAL at 08:01

## 2024-01-10 RX ADMIN — LEVETIRACETAM 1000 MG: 100 SOLUTION ORAL at 08:01

## 2024-01-10 RX ADMIN — FAMOTIDINE 20 MG: 10 INJECTION, SOLUTION INTRAVENOUS at 08:01

## 2024-01-10 RX ADMIN — GUAIFENESIN 400 MG: 200 SOLUTION ORAL at 08:01

## 2024-01-10 RX ADMIN — DEXMEDETOMIDINE HYDROCHLORIDE 0.6 MCG/KG/HR: 4 INJECTION INTRAVENOUS at 04:01

## 2024-01-10 RX ADMIN — LOSARTAN POTASSIUM 50 MG: 50 TABLET, FILM COATED ORAL at 08:01

## 2024-01-10 RX ADMIN — GUAIFENESIN 400 MG: 200 SOLUTION ORAL at 03:01

## 2024-01-10 RX ADMIN — ASPIRIN 81 MG CHEWABLE TABLET 81 MG: 81 TABLET CHEWABLE at 08:01

## 2024-01-10 RX ADMIN — DEXMEDETOMIDINE HYDROCHLORIDE 0.6 MCG/KG/HR: 4 INJECTION INTRAVENOUS at 10:01

## 2024-01-10 RX ADMIN — CARVEDILOL 3.12 MG: 3.12 TABLET, FILM COATED ORAL at 08:01

## 2024-01-10 RX ADMIN — DEXMEDETOMIDINE HYDROCHLORIDE 0.6 MCG/KG/HR: 4 INJECTION INTRAVENOUS at 06:01

## 2024-01-10 RX ADMIN — GUAIFENESIN 400 MG: 200 SOLUTION ORAL at 05:01

## 2024-01-10 RX ADMIN — DEXMEDETOMIDINE HYDROCHLORIDE 0.6 MCG/KG/HR: 4 INJECTION INTRAVENOUS at 11:01

## 2024-01-10 RX ADMIN — DILTIAZEM HYDROCHLORIDE 240 MG: 60 TABLET, FILM COATED ORAL at 08:01

## 2024-01-10 NOTE — NURSING
Nurses Note -- 4 Eyes      1/10/2024   10:59 AM      Skin assessed during: Daily Assessment      [] No Altered Skin Integrity Present    []Prevention Measures Documented      [x] Yes- Altered Skin Integrity Present or Discovered   [] LDA Added if Not in Epic (Describe Wound)   [] New Altered Skin Integrity was Present on Admit and Documented in LDA   [] Wound Image Taken    Wound Care Consulted? No    Attending Nurse:  Emily Johnson RN/Staff Member:  Jesus SALES

## 2024-01-10 NOTE — PROGRESS NOTES
[facility] - [facility] ICU  Pulmonary Critical Care Note    Patient Name: Delio Daniel Jr.  MRN: [id number]  Admission Date: 12/19/2023  Hospital Length of Stay: 22 days  Code Status: Full Code  Attending Provider: Efrain Salcido MD  Primary Care Provider: Candy, Primary Doctor     Subjective:     HPI:   Delio Daniel Jr. is a 68 y.o. 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 His last known normal was 9:15 per EMS. Stroke protocol in ED initiated. Unofficial CT head showed possible dense R MCA. Pt taken to cath lab for BRAD thrombectomy. Admitted to ICU for post-operative care and monitoring.     Hospital Course/Significant events:  12/19/2023 - Admitted to ICU s/p right internal carotid artery thrombectomy  12/20/2023 - 12/20/2023 he had a rapid deterioration in his neurologic status with CT showing, as expected, a large right MCA distribution infarct with marked right-to-left shift. s/p craniectomy  12/29/23 - s/p tracheotomy  1/2/2024- PEG tube placed   1/7/23 - Catheter exchanged    24 Hour Interval History:  No acute events over night. Hypertensive with SBP 150s otherwise vital signs remained stable; afebrile. Remains on ventilation via tracheostomy and sedated on precedex. Vent settings currently on AC mode RR14/PEEP5/FiO2%30. Currently pending placement, awaiting approval.     Past Medical History:   Diagnosis Date    Arthritis     Atrial fibrillation     BPH (benign prostatic hyperplasia)     Cardiac arrest     Coronary artery disease     Cyst, kidney, acquired     Diverticulosis     Hyperlipidemia     Hypertension     MI (myocardial  infarction)     Obesity     Steatosis of liver      Past Surgical History:   Procedure Laterality Date    A-V CARDIAC PACEMAKER INSERTION Right     CARDIAC CATHETERIZATION      COLONOSCOPY W/ BIOPSIES      CRANIECTOMY Right 12/20/2023    Procedure: CRANIECTOMY;  Surgeon: Artem Can MD;  Location: [facility] OR;  Service: Neurosurgery;  Laterality: Right;    ESOPHAGOGASTRODUODENOSCOPY W/ PEG N/A 1/2/2024    Procedure: PEG;  Surgeon: Tani Day MD;  Location: [facility] ENDOSCOPY;  Service: Gastroenterology;  Laterality: N/A;    excision of colon      TRACHEOSTOMY N/A 12/29/2023    Procedure: CREATION, TRACHEOSTOMY;  Surgeon: Patricia Winslow MD;  Location: [facility] OR;  Service: ENT;  Laterality: N/A;  REQ 1130 //  NEEDS 2 SCRUBS     Social History     Socioeconomic History    Marital status:     Number of children: 9   Occupational History    Occupation: retired   Tobacco Use    Smoking status: Former    Smokeless tobacco: Never   Substance and Sexual Activity    Alcohol use: Not Currently    Drug use: Not Currently    Sexual activity: Not Currently     Partners: Female     Social Determinants of Health     Financial Resource Strain: Low Risk  (10/19/2022)    Overall Financial Resource Strain (CARDIA)     Difficulty of Paying Living Expenses: Not hard at all   Food Insecurity: No Food Insecurity (10/19/2022)    Hunger Vital Sign     Worried About Running Out of Food in the Last Year: Never true     Ran Out of Food in the Last Year: Never true   Transportation Needs: No Transportation Needs (10/19/2022)    PRAPARE - Transportation     Lack of Transportation (Medical): No     Lack of Transportation (Non-Medical): No   Physical Activity: Sufficiently Active (10/19/2022)    Exercise Vital Sign     Days of Exercise per Week: 6 days     Minutes of Exercise per Session: 60 min   Stress: No Stress Concern Present (10/19/2022)    [employer] [place] [employer] - Occupational Stress Questionnaire      Feeling of Stress : Not at all   Social Connections: Unknown (10/19/2022)    Social Connection and Isolation Panel [NHANES]     Frequency of Communication with Friends and Family: More than three times a week     Frequency of Social Gatherings with Friends and Family: More than three times a week     Attends Latter-day Services: More than 4 times per year     Active Member of Clubs or Organizations: No     Attends Club or Organization Meetings: Never   Housing Stability: Low Risk  (10/19/2022)    Housing Stability Vital Sign     Unable to Pay for Housing in the Last Year: No     Number of Places Lived in the Last Year: 1     Unstable Housing in the Last Year: No     Current Outpatient Medications   Medication Instructions    albuterol (PROVENTIL/VENTOLIN HFA) 90 mcg/actuation inhaler 2 puffs, Inhalation, Every 6 hours PRN, Rescue    aspirin 81 MG Chew chew and swallow 1 tablet by mouth daily    atorvastatin (LIPITOR) 40 mg, Oral, Daily    cetirizine (ZYRTEC) 10 mg, Oral, Daily    diltiaZEM (CARDIZEM CD) 360 mg, Oral, Daily    losartan (COZAAR) 50 mg, Oral, Daily    metoprolol succinate (TOPROL-XL) 200 mg, Oral, 2 times daily    omeprazole (PRILOSEC) 20 mg, Oral, Daily, One tab by mouth daily    potassium chloride (KLOR-CON) 20 mEq Pack 20 mEq, Oral, Daily    spironolactone (ALDACTONE) 50 mg, Oral, Daily    torsemide (DEMADEX) 10 mg, Oral, Daily    vitamin D (VITAMIN D3) 1,000 Units, Oral, Daily    XARELTO 20 mg Tab TAKE 1 TABLET BY MOUTH DAILY with SUPPER     Current Inpatient Medications   aspirin  81 mg Per G Tube Daily    atorvastatin  10 mg Per G Tube Daily    carvediloL  3.125 mg Per G Tube BID    diltiaZEM  240 mg Per G Tube Daily    docusate  100 mg Per G Tube BID    enoxparin  1 mg/kg Subcutaneous Q12H (treatment, non-standard time)    famotidine (PF)  20 mg Intravenous Daily    guaiFENesin 100 mg/5 ml  400 mg Oral Q8H    levetiracetam  1,000 mg Per G Tube BID    losartan  50 mg Per G Tube Daily    mannitol  20%  75 g Intravenous Once    neomycin-bacitracin-polymyxin   Topical (Top) BID    QUEtiapine  25 mg Per G Tube BID     Current Intravenous Infusions   dexmedeTOMIDine (Precedex) infusion (titrating) 0.6 mcg/kg/hr (01/09/24 2113)    fentanyl Stopped (12/31/23 0634)    NORepinephrine bitartrate-D5W Stopped (12/20/23 1929)       Review of Systems   Unable to perform ROS: Critical illness   All other systems reviewed and are negative.     Objective:       Intake/Output Summary (Last 24 hours) at 1/10/2024 0329  Last data filed at 1/9/2024 1818  Gross per 24 hour   Intake 487.84 ml   Output 1225 ml   Net -737.16 ml       Vital Signs (Most Recent):  Temp: 98.7 °F (37.1 °C) (01/09/24 1700)  Pulse: 72 (01/09/24 2112)  Resp: (!) 22 (01/09/24 1900)  BP: 131/87 (01/09/24 2112)  SpO2: (!) 94 % (01/09/24 1900)  Body mass index is 36.88 kg/m².  Weight: 119.9 kg (264 lb 5.3 oz) Vital Signs (24h Range):  Temp:  [98.7 °F (37.1 °C)-99.1 °F (37.3 °C)] 98.7 °F (37.1 °C)  Pulse:  [60-97] 72  Resp:  [13-23] 22  SpO2:  [92 %-97 %] 94 %  BP: ()/() 131/87     Physical Exam  General: No acute distress. Sleeping at this timee.  HEENT: Normocephalic, atraumatic. Face symmetric.    Cardiovascular: Regular rate & rhythm  Pulmonary: Bilateral symmetric chest rise, patient mechanically ventilated via trach  Abdominal:  non-distended, soft, round, positive bowel sounds  Extremities: No clubbing or cyanosis.  1+ pitting edema distal LUE  Neuro:   trached on th vent; sedated on precedex.  Patient has non purposeful movements of RUE, does not follow commands.      Lines/Drains/Airways       Drain  Duration                  Urethral Catheter 16 Fr. -- days         Gastrostomy/Enterostomy 01/02/24 1230 LUQ 7 days         Fecal Incontinence  01/07/24 0357 2 days              Airway  Duration             Adult Surgical Airway 12/29/23 1229 11 days              Peripheral Intravenous Line  Duration                  Peripheral IV -  "Single Lumen 12/25/23 1705 20 G Anterior;Left;Proximal Forearm 15 days         Midline Catheter Insertion/Assessment  - Single Lumen 12/29/23 1400 Right cephalic vein (lateral side of arm) 11 days                    Significant Labs:  Lab Results   Component Value Date    WBC 7.26 01/10/2024    HGB 10.9 (L) 01/10/2024    HCT 35.0 (L) 01/10/2024    MCV 91.9 01/10/2024     01/10/2024       BMP  Lab Results   Component Value Date     01/10/2024    K 3.9 01/10/2024    CHLORIDE 105 01/10/2024    CO2 30 01/10/2024    BUN 14.5 01/10/2024    CREATININE 0.77 01/10/2024    CALCIUM 7.9 (L) 01/10/2024    AGAP 7.0 01/04/2024    EGFRNONAA 61 04/23/2022     ABG  No results for input(s): "PH", "PO2", "PCO2", "HCO3", "POCBASEDEF" in the last 168 hours.    Mechanical Ventilation Support:  Vent Mode: A/C (01/10/24 0234)  Set Rate: 14 BPM (01/10/24 0234)  Vt Set: 460 mL (01/10/24 0234)  Pressure Support: 10 cmH20 (01/04/24 2337)  PEEP/CPAP: 5 cmH20 (01/10/24 0234)  Oxygen Concentration (%): 30 (01/10/24 0234)  Peak Airway Pressure: 31 cmH20 (01/10/24 0234)  Total Ve: 6.8 L/m (01/10/24 0234)  F/VT Ratio<105 (RSBI): (!) 20.59 (01/09/24 1614)      Significant Imaging:  No imaging this AM      Assessment/Plan:     Assessment  CVA s/p right ICA and MCA M3  branch thrombectomy s/p craniectomy with hemorrhagic conversion  Superficial thrombosis in left cephalic vein  Confirmed by DVT U/S on 12/26/2023  Acute hypoxic respiratory failure requiring intubation  Atrial fibrillation w/ RVR-rate now controlled.  Onychomycosis  HX of CAD, HTN, HLD, ADA  Hypernatremia  Urine osmolality indicative of extrarenal process, likely 2/2 NG tube as well as lack of intake      Plan  - Free water flushes to PEG Currently at 210 ml q 4 hours. Initiate TF as tolerated.   -Continue efforts to wean sedation and mechanical ventilation wean with trach as tolerated  -Bowel regimen (Miralax TID and Senna BID) stopped due to excessive number of BM "   -Neurosurgery signed off on 1/3/24   -Bone flap precautions and continue helmet use out of bed   -Seizure precautions and continue Keppra BID via Gtube   -Will require outpatient referral from placement back to outpatient neurosurgery office for consideration of cranioplasty   -Gastroenterology signed off on 1/3/24   -Continue Tfs and PEG care   -Neurology signed off on 12/22/23   -SBP goal <160    -Continue ASA 81mg qd   -Cardiology signed off on 12/25/23   -Continue Diltiazem 240mg qd, Losartan and Metoprolol tartrate 100mg BID    -Lopressor IV PRN for HR>120   -Remains on full-dose Lovenox for left cephalic vein thrombosis  -Will need to discuss placement - LTAC vs nursing home. Will follow up with CM.   -Urology following given paraphimosis; appreciate their assistance   -Continue antibiotic ointment with underwood care  -elevation/ice    DVT ppx: FD Lovenox   GI ppx: Casey Barlow MD   Pulmonary Critical Care Medicine  [facility] - [facility] ICU  DOS: 01/10/2024

## 2024-01-10 NOTE — PRE ADMISSION SCREENING
[facility]    Pre-Admission Patient Screening                    Pre-Screen type:  LTAC:  Reason for Admission:    ACUTE RESPIRATORY FAILURE W/ HYPOXIA S/P TRACH ON VENTILATOR SUPPORT    LTACH Admission Criteria:    Management of at least one of the following complex respiratory conditions:  Bronchodilators (excluding MDIs) greater than or equal to 4 times in 24 hours  Cardiac monitoring for dyspnea, electrolyte imbalances, post pacer insertion, significant arrhythmia, or syncope/pre-syncope  Mechanical ventilation/NIPPV  Active weaning process from mechanical ventilation    Must meet at least three of the following concomitant treatments/intervention daily unless notes: (excludes PO meds unless notes)  IV medication per therapeutic regimen  Bronchodilators  Cardiac Monitoring  IV fluides greater than 50 cc/hr  Laboratory assessment and medication adjustment(s)  Mechanical ventilation/NIPPV  Nebulizer treatments at least every 6 hours  Neurological assessment greater than or equal to 3 times a day  Oxygen and SaO2/ABG adjustments and greater than or equal to 28% supplemental O2  Rehab Therapy (PT/OT/ST) 1-3 hours a day greater than or equal to 5 days a week  Suctioning at least every 4 hours  Parenteral nutrition/Enteral feedings      LTACH more appropriate than other levels of care (eg, skilled nursing facility, home health care), as indicated by:    Clinical management of patient deemed too frequent and needed beyond the capabilities of alternative levels of care as evidence by: Blood glucose monitoring greater than or equal to 4 times daily requiring clinical intervention, Active titration of oxygen , and Frequency of IV medications greater than or equal to 2 times daily  Frequent diagnostic services needed on an inpatient basis, including clinical assessments, laboratory, and imaging as evidence by: Frequent monitoring and clinical assessments performed by a licensed RN to identify current  and future patient needs by incorporating the recognition of normal vs abnormal body physiology, and to prompt recognition of pertinent changes to identify and prioritize appropriate interventions that can be performed within the acute inpatient setting. , Frequent monitoring and clinical assessments performed by a licensed RT to identify current and future patient needs by incorporating the recognition of normal vs abnormal body physiology of the Respiratory System, and to prompt recognition of pertinent changes to identify and prioritize appropriate interventions that can be performed within the acute inpatient setting. , and Frequent laboratory testing and/or imaging to aide in the improvement and effectiveness of patient's individualized treatment plan.   More intensive services, such as speciality nursing care, and/or onsite physician assessments needed that are not available at a lower level of care as evidence by: Daily physician intervention , Collaboration between consulting and attending providers still deemed a necessity to aide in the improvement and effectiveness of patient's individualized treatment plan, which can be provided at an [facility] level of care. , and Therapy Services to be included in patient's treatment plan in an effort to restore/improve patient's modality status to a safe level of functioning prior to acute illness.      Patient is stable for transfer to [facility], as indicated by ALL of the following:      Hypotension Absent     Cardiovascular status stable     Stable chest findings     Renal function accepctable   Pain adequately managed    Intake acceptable       No acute significant hepatic dysfunction (eg, new encephalopathy)   No acute severe unstable neurologic abnormalities (eg, Altered mental status that is severe or persistent, or evidence of ongoing CNS embolization or ischemia, worsening hydrocephalus)   No active bleeding or unstable disorders of hemostasis (eg, no recent need for  transfusion, severe thrombocytopenia with bleeding)   No need for respiratory or other isolation, OR manageable at LTACH level of care    Long-term enteral feeding (eg, PEG) and intravenous access established, not needed, OR to be placed at LTACH level of care      Anticipated Discharge Disposition:    N/A    Facility Status: Accept     Referring Physician:  DR. CAITLYN ZIEGLER    Admitting Physician:  Delonte Randle MD    Primary Care Physician:  No, Primary Doctor    History         Patient Active Problem List    Diagnosis Date Noted    Stroke 12/19/2023    Benign prostatic hyperplasia 12/11/2023    Myocardial infarction 12/11/2023    Atrial fibrillation 08/26/2023    Bilateral lower extremity edema 08/16/2023    History of deep vein thrombosis (DVT) of lower extremity 08/08/2023    Current use of long term anticoagulation 08/08/2023    Hypertension 10/05/2022    Hyperlipidemia LDL goal <70 10/05/2022    Hypokalemia 10/05/2022    Coronary artery disease involving native heart without angina pectoris 10/05/2022    Second degree AV block 10/05/2022    Cardiac arrest with ventricular fibrillation 10/05/2022    Cardiac pacemaker in situ 10/05/2022    Nodule of left lung 07/20/2022    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         Previous Specialties/Consulted physicians:      Cardiology , Nephrology , Neurology, Pulmonology , and Other: UROLOGY      Past and Current Medical History    Past Medical History:   Diagnosis Date    Arthritis     Atrial fibrillation     BPH (benign prostatic hyperplasia)     Cardiac arrest     Coronary artery disease     Cyst, kidney, acquired     Diverticulosis     Hyperlipidemia     Hypertension     MI (myocardial infarction)     Obesity     Steatosis of liver      Past Surgical History:   Procedure Laterality Date    A-V CARDIAC PACEMAKER INSERTION Right      CARDIAC CATHETERIZATION        COLONOSCOPY W/ BIOPSIES        CRANIECTOMY Right 12/20/2023     Procedure:  CRANIECTOMY;  Surgeon: Artem Can MD;  Location: [facility] OR;  Service: Neurosurgery;  Laterality: Right;    ESOPHAGOGASTRODUODENOSCOPY W/ PEG N/A 1/2/2024     Procedure: PEG;  Surgeon: Tani Day MD;  Location: [facility] ENDOSCOPY;  Service: Gastroenterology;  Laterality: N/A;    excision of colon        TRACHEOSTOMY N/A 12/29/2023     Procedure: CREATION, TRACHEOSTOMY;  Surgeon: Patricia Winslow MD;  Location: [facility] OR;  Service: ENT;  Laterality: N/A;  REQ 1130 //  NEEDS 2 SCRUBS           History of Present Illness     HPI:   Delio Daniel Jr. is a 68 y.o. 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 on 12/19 with complaints of L facial droop, slurred speech, L sided hemiparesis, and fixed R sided gaze. His last known normal was 9:15 per EMS. Stroke protocol in ED initiated. Unofficial CT head showed possible dense R MCA. Pt taken to cath lab for BRAD thrombectomy. Admitted to ICU for post-operative care and monitoring.      Hospital Course/Significant events:  12/19/2023 - Admitted to ICU s/p right internal carotid artery thrombectomy  12/20/2023 - 12/20/2023 he had a rapid deterioration in his neurologic status with CT showing, as expected, a large right MCA distribution infarct with marked right-to-left shift. s/p craniectomy  12/29/23 - s/p tracheotomy  1/2/2024- PEG tube placed   1/7/23 - Catheter exchanged     24 Hour Interval History:  No acute events over night. Hypertensive with SBP 150s otherwise vital signs remained stable; afebrile. Remains on ventilation via tracheostomy and sedated on precedex. Vent settings currently on AC mode RR14/PEEP5/FiO2%30. Currently pending placement, awaiting approval.     Assessment/Plan:      Assessment  CVA  s/p right ICA and MCA M3  branch thrombectomy s/p craniectomy with hemorrhagic conversion  Superficial thrombosis in left cephalic vein  Confirmed by DVT U/S on 12/26/2023  Acute hypoxic respiratory failure requiring intubation  Atrial fibrillation w/ RVR-rate now controlled.  Onychomycosis  HX of CAD, HTN, HLD, ADA  Hypernatremia  Urine osmolality indicative of extrarenal process, likely 2/2 NG tube as well as lack of intake        Plan  - Free water flushes to PEG Currently at 210 ml q 4 hours. Initiate TF as tolerated.   -Continue efforts to wean sedation and mechanical ventilation wean with trach as tolerated  -Bowel regimen (Miralax TID and Senna BID) stopped due to excessive number of BM   -Neurosurgery signed off on 1/3/24              -Bone flap precautions and continue helmet use out of bed              -Seizure precautions and continue Keppra BID via Gtube              -Will require outpatient referral from placement back to outpatient neurosurgery office for consideration of cranioplasty   -Gastroenterology signed off on 1/3/24              -Continue Tfs and PEG care   -Neurology signed off on 12/22/23              -SBP goal <160               -Continue ASA 81mg qd   -Cardiology signed off on 12/25/23              -Continue Diltiazem 240mg qd, Losartan and Metoprolol tartrate 100mg BID               -Lopressor IV PRN for HR>120   -Remains on full-dose Lovenox for left cephalic vein thrombosis  -Will need to discuss placement - LTAC vs nursing home. Will follow up with CM.   -Urology following given paraphimosis; appreciate their assistance              -Continue antibiotic ointment with [facility] care  -elevation/ice     DVT ppx: FD Lovenox   GI ppx: Casey Barlow MD   Pulmonary Critical Care Medicine  [facility] - [address] [place] [address] ICU    Patient Traveled outside of the [place] in the last 3 months? no     Patient discharged from this LTAC to SNF within the last 45 days? no    Patient  discharged from this LTAC to Rehab within the last 27 days? no    Prior residence: home    Prior Post-Acute Services: N/A     Allergies: Review of patient's allergies indicates:  No Known Allergies    Has patient received the current influenza vaccine (Oct 1 - March 31)? Unknown     Has patient received PPD skin test prior to admit? No    Code Status: Full Code    Orientation: Unable to assess (ventilated/sedated/aphasic)  Neuro:   trached on th vent; sedated on precedex.  Patient has non purposeful movements of RUE, does not follow commands.     Speech: unable to assess (ventilated/sedated/aphasic)    Vital Signs:     Date     Blood Pressure     Pulse     Respiratory Rate     O2 Saturation     Temperature         Bowel/Bladder: incontinent of bladder and incontinent of bowel  Bowel/Bladder Appliance: fecal management system, Insertion Date: 1/7/24 and external urinary catheter    Dialysis: N/A         Peripheral IV - Single Lumen 12/25/23 1705 20 G Anterior;Left;Proximal Forearm (Active)   Site Assessment Clean;Dry;Intact 01/10/24 0800   Extremity Assessment Distal to IV No abnormal discoloration;No redness;No swelling;No warmth 01/09/24 1900   Line Status No blood return;Occluded 01/10/24 0800   Dressing Status Clean;Dry;Intact 01/10/24 0301   Dressing Intervention Integrity maintained 01/10/24 0301   Number of days: 15            Midline Catheter Insertion/Assessment  - Single Lumen 12/29/23 1400 Right cephalic vein (lateral side of arm) (Active)   Site Assessment Clean;Dry;Intact;No redness;No swelling 01/10/24 0800   IV Device Securement catheter securement device 01/10/24 0800   Line Status Blood return noted;Flushed;Saline locked 01/10/24 0800   Dressing Type Central line dressing 01/10/24 0800   Dressing Status Clean;Dry;Intact 01/10/24 0800   Dressing Intervention Integrity maintained 01/10/24 0800   Dressing Change Due 01/10/24 01/10/24 0800   Number of days: 11            Gastrostomy/Enterostomy 01/02/24  "1230 LUQ (Active)   Securement secured to abdomen 01/10/24 0800   Interventions Prior to Feeding patency checked 01/10/24 0800   Feeding Type continuous 01/10/24 0800   Clamp Status/Tolerance abdominal distention;unclamped 01/10/24 0800   Feeding Action feeding continued 01/10/24 0800   Dressing dry and intact 01/10/24 0800   Insertion Site dry;no redness 01/10/24 0800   Site Care site cleansed w/ sterile normal saline;outer bumper rotated 01/06/24 1600   Flush/Irrigation flushed w/;no resistance met;water 01/10/24 0800   Current Rate (mL/hr) 20 mL/hr 01/10/24 0800   Goal Rate (mL/hr) 20 mL/hr 01/10/24 0800   Intake (mL) 60 mL 01/09/24 0800   Water Bolus (mL) 1400 mL 01/07/24 1800   Formula Name Peptamen 01/10/24 0800   Tube Feeding Intake (mL) 250 01/10/24 0400   Residual Amount (ml) 15 ml 01/04/24 1118   Number of days: 7            Urethral Catheter 16 Fr. (Active)   $ Fernandez Insertion Bedside Insertion Performed 01/07/24 1345   Site Assessment Clean;Intact 01/10/24 0800   Collection Container Urimeter 01/10/24 0800   Securement Method secured to top of thigh w/ adhesive device 01/10/24 0800   Catheter Care Performed yes 01/10/24 0800   Reason for Continuing Urinary Catheterization Placed by Urology Service 01/10/24 0800   CAUTI Prevention Bundle Securement Device in place with 1" slack 01/10/24 0800   Output (mL) 220 mL 01/10/24 1000   Number of days:             Fecal Incontinence  01/07/24 0357 (Active)   Application fecal incontinence  in place 01/10/24 0800   Drainage Method attached to drainage bag 01/10/24 0800   Securement to gravity 01/10/24 0800   Skin no breakdown 01/10/24 0800   Tolerance no signs/symptoms of discomfort 01/10/24 0800   Stool (mL) 200 mL 01/09/24 0500   Number of days: 3       CBGs/Accuchecks: Yes     Precautions: Aspiration    Restraints: No     Isolation Precautions: N/A       Facility-Administered Medications as of 1/10/2024   Medication Dose Route Frequency " Provider Last Rate Last Admin    0.9%  NaCl infusion (for blood administration)   Intravenous Q24H PRN Fidel Kraus, AGACNP-BC        acetaminophen oral solution 650 mg  650 mg Per G Tube Q4H PRN KODI Drake MD   650 mg at 01/07/24 2017    aspirin chewable tablet 81 mg  81 mg Per G Tube Daily KODI Drake MD   81 mg at 01/10/24 0801    atorvastatin tablet 10 mg  10 mg Per G Tube Daily KODI Drake MD   10 mg at 01/10/24 0801    [COMPLETED] bisacodyL suppository 10 mg  10 mg Rectal Once Stewart Kee MD   10 mg at 12/31/23 2040    [COMPLETED] bumetanide injection 1 mg  1 mg Intravenous Once Christiano Taylor MD   1 mg at 12/31/23 0850    carvediloL tablet 3.125 mg  3.125 mg Per G Tube BID KODI Drake MD   3.125 mg at 01/10/24 0801    [COMPLETED] ceFAZolin (ANCEF) 1 gram injection        1,000 mg at 01/02/24 1015    dexmedetomidine (PRECEDEX) 400mcg/100mL 0.9% NaCL infusion  0-1.4 mcg/kg/hr Intravenous Continuous Italo Orozco MD 16.5 mL/hr at 01/10/24 1058 0.6 mcg/kg/hr at 01/10/24 1058    dextrose 10% bolus 125 mL 125 mL  12.5 g Intravenous PRN Eleazar Westbrook MD        dextrose 10% bolus 250 mL 250 mL  25 g Intravenous PRN Eleazar Westbrook MD        [COMPLETED] digoxin injection 250 mcg  250 mcg Intravenous Once Leopoldo Mullins, FNP   250 mcg at 12/22/23 1644    [COMPLETED] digoxin injection 500 mcg  500 mcg Intravenous Once Leopoldo Mullins, FNP   500 mcg at 12/22/23 1135    [COMPLETED] diltiaZEM injection 10 mg  10 mg Intravenous Once Eleazar Westbrook MD   10 mg at 12/20/23 0052    diltiaZEM tablet 240 mg  240 mg Per G Tube Daily KODI Drake MD   240 mg at 01/10/24 0801    docusate 50 mg/5 mL liquid 100 mg  100 mg Per G Tube BID KODI Drake MD   100 mg at 01/10/24 0800    enoxaparin injection 120 mg  1 mg/kg Subcutaneous Q12H (treatment, non-standard time) Efrain Salcido MD   120 mg at 01/10/24 0802    [COMPLETED]  etomidate injection 20 mg  20 mg Intravenous Once Italo Orozco MD   20 mg at 12/20/23 0947    famotidine (PF) injection 20 mg  20 mg Intravenous Daily Kenny Richter DO   20 mg at 01/10/24 0801    fentaNYL 2500 mcg in 0.9% sodium chloride 250 mL infusion premix (titrating)  0-250 mcg/hr Intravenous Continuous Arun Norman DO   Stopped at 12/31/23 0634    fentaNYL injection 50 mcg  50 mcg Intravenous Q1H PRN Eleazar Westbrook MD   50 mcg at 01/08/24 1251    glucagon (human recombinant) injection 1 mg  1 mg Intramuscular PRN Eleazar Westbrook MD        [COMPLETED] glycopyrrolate injection 0.1 mg  0.1 mg Intravenous Once Stewart Kee MD   0.1 mg at 01/07/24 1607    guaiFENesin 100 mg/5 ml syrup 400 mg  400 mg Per G Tube Q8H Efrain Salcido MD        [COMPLETED] human prothrombin complex (PCC) (KCENTRA) 2,750 Units in sterile water for injection IVPB  25 Units/kg Intravenous Once Fidel Kraus AGACNP- mL/hr at 12/20/23 1552 2,750 Units at 12/20/23 1552    hydrALAZINE injection 10 mg  10 mg Intravenous Q2H PRN Kenny Richter DO   10 mg at 01/06/24 0604    insulin aspart U-100 injection 0-5 Units  0-5 Units Subcutaneous Q6H PRN Eleazar Westbrook MD   2 Units at 12/20/23 0624    [COMPLETED] iopamidoL (ISOVUE-370) injection 100 mL  100 mL Intravenous ONCE PRN Rob Delgado MD   50 mL at 12/19/23 1147    [COMPLETED] iopamidoL (ISOVUE-370) injection 150 mL  150 mL Other ONCE PRN Delonte Randle MD   150 mL at 12/19/23 1426    [COMPLETED] ipratropium 0.02 % nebulizer solution 0.5 mg  0.5 mg Nebulization Q6H Christiano Taylor MD   0.5 mg at 01/01/24 1213    ipratropium 0.02 % nebulizer solution 0.5 mg  0.5 mg Nebulization Q6H PRN Christiano Taylor MD        labetaloL injection 10 mg  10 mg Intravenous Q2H PRN Kenny Richter DO   10 mg at 01/07/24 1655    [COMPLETED] lactated ringers bolus 1,000 mL  1,000 mL Intravenous Once Itz Francisco MD    Stopped at 23    [COMPLETED] levalbuterol nebulizer solution 1.25 mg  1.25 mg Nebulization Q6H Christiano Taylor MD   1.25 mg at 24 1213    levalbuterol nebulizer solution 1.25 mg  1.25 mg Nebulization Q6H PRN Christiano Taylor MD        levetiracetam 500 mg/5 mL (5 mL) liquid Soln 1,000 mg  1,000 mg Per G Tube BID Efrain Salcido MD   1,000 mg at 01/10/24 0801    losartan tablet 50 mg  50 mg Per G Tube Daily KODI Drake MD   50 mg at 01/10/24 0800    [COMPLETED] magnesium sulfate 2g in water 50mL IVPB (premix)  2 g Intravenous Once Eleazar Westbrook MD   Stopped at 23 0322    mannitol 20% infusion 75 g  75 g Intravenous Once Fidel Kraus AGACNP-BC        [COMPLETED] mannitol 25% injection 25 g  25 g Intravenous Once Italo Orozco MD        [COMPLETED] mannitol 25% injection 75 g  75 g Intravenous Once Fidel Kraus AGACNP-BC   Stopped at 23 1152    methyl salicylate liquid   Topical (Top) PRN Efrain Salcido MD   Given at 24 0830    metoprolol injection 5 mg  5 mg Intravenous Q5 Min PRN Patrick Gipsno, DO   5 mg at 23 0339    [COMPLETED] mupirocin 2 % ointment   Nasal BID Italo Orozco MD   Given at 23 0910    neomycin-bacitracin-polymyxin ointment   Topical (Top) BID Kirk Mullins MD   Given at 01/10/24 0802    NORepinephrine 8 mg in dextrose 5% 250 mL infusion  0-3 mcg/kg/min Intravenous Continuous Italo Orozco MD   Stopped at 23 1929    ondansetron injection 8 mg  8 mg Intravenous Q6H PRN Amol Gipsonle, DO   8 mg at 24 1251    polyethylene glycol packet 17 g  17 g Per G Tube Daily PRN KODI Drake MD        [COMPLETED] potassium bicarbonate disintegrating tablet 40 mEq  40 mEq Per G Tube Once KODI Drake MD   40 mEq at 23 1659    [] potassium chloride 10 mEq in 100 mL IVPB  10 mEq Intravenous Q1H Eleazar Wsetbrook MD   Stopped at 23 0557    [COMPLETED] potassium chloride 20  mEq in 100 mL IVPB (FOR CENTRAL LINE ADMINISTRATION ONLY)  20 mEq Intravenous Q2H Gasper Barlow MD   Stopped at 01/08/24 1250    [COMPLETED] potassium phosphate 15 mmol in dextrose 5 % (D5W) 250 mL infusion  15 mmol Intravenous Once Kenny Richter DO   Stopped at 12/27/23 1133    [COMPLETED] potassium phosphate 20 mmol in dextrose 5 % (D5W) 500 mL infusion  20 mmol Intravenous Once Kenny Richter DO   Stopped at 12/23/23 0919    QUEtiapine tablet 25 mg  25 mg Per G Tube BID Efrain Salcido MD   25 mg at 01/10/24 0800    [COMPLETED] rocuronium injection 100 mg  100 mg Intravenous Once Italo Orozco MD   100 mg at 12/20/23 0948    sodium chloride 0.9% flush 10 mL  10 mL Intravenous PRN Ijeoma Hernandez, GIANNI        [COMPLETED] sodium chloride 3% HYPERTONIC bolus  100 mL Intravenous Once Fidel Kraus AGACNP- mL/hr at 12/20/23 1100 100 mL at 12/20/23 1100     Outpatient Medications as of 1/10/2024   Medication Sig Dispense Refill    aspirin 81 MG Chew chew and swallow 1 tablet by mouth daily 90 tablet 3    atorvastatin (LIPITOR) 40 MG tablet Take 1 tablet (40 mg total) by mouth once daily. 90 tablet 1    diltiaZEM (CARDIZEM CD) 360 MG 24 hr capsule Take 1 capsule (360 mg total) by mouth once daily. 90 capsule 1    losartan (COZAAR) 50 MG tablet Take 1 tablet (50 mg total) by mouth once daily. 90 tablet 1    metoprolol succinate (TOPROL-XL) 200 MG 24 hr tablet Take 1 tablet (200 mg total) by mouth 2 (two) times daily. 180 tablet 1    omeprazole (PRILOSEC) 20 MG capsule Take 1 capsule (20 mg total) by mouth once daily. One tab by mouth daily 30 capsule 2    potassium chloride (KLOR-CON) 20 mEq Pack Take 20 mEq by mouth once daily. 30 packet 5    spironolactone (ALDACTONE) 50 MG tablet Take 1 tablet (50 mg total) by mouth once daily. 90 tablet 1    torsemide (DEMADEX) 10 MG Tab Take 1 tablet (10 mg total) by mouth once daily. 30 tablet 0    vitamin D (VITAMIN D3) 1000 units Tab Take 1 tablet (1,000 Units total)  "by mouth once daily. 30 tablet 1    XARELTO 20 mg Tab TAKE 1 TABLET BY MOUTH DAILY with SUPPER 90 tablet 3        Cardiovascular:    Cardiovascular Review: Requires Review    Telemetry: Yes    Rhythm:  NSR    AICD: No      Respiratory:    Oxygen:  VIA TRACH AND VENTILATOR     CPT/Frequency: N/A    Incentive Spirometer/Frequency: N/A    CPAP/Settings: N/A    BiPAP/Settings: N/A    Oxygen Saturation:  93-97%    Suction Frequency:  Q4HRS AND PRN       Vent Settings:Mechanical Ventilation Support:  Vent Mode: A/C (01/10/24 0234)  Set Rate: 14 BPM (01/10/24 0234)  Vt Set: 460 mL (01/10/24 0234)  Pressure Support: 10 cmH20 (01/04/24 2337)  PEEP/CPAP: 5 cmH20 (01/10/24 0234)  Oxygen Concentration (%): 30 (01/10/24 0234)  Peak Airway Pressure: 31 cmH20 (01/10/24 0234)  Total Ve: 6.8 L/m (01/10/24 0234)  F/VT Ratio<105 (RSBI): (!) 20.59 (01/09/24 1614)   Mode:   Rate:   TV:   FIO2:   PEEP:   PS:   iTime:    Trial/HS Settings:   Mode:   Rate:   TV:   FIO2:   PEEP:   PS:       Nutrition:      Ht Readings from Last 3 Encounters:   12/21/23 5' 10.98" (1.803 m)   12/11/23 5' 9.69" (1.77 m)   12/05/23 5' 9.69" (1.77 m)     Wt Readings from Last 1 Encounters:   12/29/23 0541 119.9 kg (264 lb 5.3 oz)   12/28/23 0600 116.9 kg (257 lb 11.5 oz)   12/27/23 0600 116.9 kg (257 lb 11.5 oz)   12/26/23 0600 116.1 kg (255 lb 15.3 oz)   12/24/23 0544 111.1 kg (244 lb 14.9 oz)   12/19/23 1645 110 kg (242 lb 8.1 oz)   12/19/23 1053 110 kg (242 lb 8.1 oz)       Feeding Status:   Current Diet: NPO   Supplements:N/A   Tube Feeding:  PEPTIDE 1.5    Flushes:  210CC Q4HRS.       Integumentary:    Integumentary: Other: SEE BELOW               Incision/Site 12/20/23 1732 Right Head lateral (Active)   12/20/23 1732   Present Prior to Hospital Arrival?: No   Side: Right   Location: Head   Orientation: lateral   Incision Type:    Closure Method: Staples   Additional Comments:    Removal Indication and Assessment:    Wound Outcome:    Removal Indications:  "   Incision WDL WDL 01/10/24 0800   Dressing Appearance Open to air 01/10/24 0800   Drainage Amount None 01/10/24 0800   Drainage Characteristics/Odor No odor 01/09/24 1600   Appearance Intact;Dry 01/09/24 1600   Periwound Area Intact;Dry 01/10/24 0800   Wound Edges Approximated 01/10/24 0800   Care Cleansed with:;Sterile normal saline 01/05/24 0800   Dressing Removed;Rolled gauze 12/24/23 1200   Number of days: 21            Incision/Site 12/20/23 1733 Right Head (Active)   12/20/23 1733   Present Prior to Hospital Arrival?:    Side: Right   Location: Head   Orientation:    Incision Type:    Closure Method:    Additional Comments:    Removal Indication and Assessment:    Wound Outcome:    Removal Indications:    Incision WDL WDL 01/10/24 0800   Dressing Appearance Open to air 01/10/24 0800   Drainage Amount None 01/10/24 0800   Drainage Characteristics/Odor No odor 01/09/24 1600   Appearance Intact;Dry 01/09/24 1600   Periwound Area Intact;Dry 01/10/24 0800   Wound Edges Approximated 01/10/24 0800   Care Cleansed with:;Sterile normal saline 01/05/24 0800   Number of days: 21            Incision/Site 12/29/23 1235 Neck (Active)   12/29/23 1235   Present Prior to Hospital Arrival?:    Side:    Location: Neck   Orientation:    Incision Type:    Closure Method:    Additional Comments:    Removal Indication and Assessment:    Wound Outcome:    Removal Indications:    Incision WDL WDL 01/10/24 0800   Dressing Appearance Moist drainage 01/10/24 0800   Drainage Amount Scant 01/10/24 0800   Drainage Characteristics/Odor Yellow 01/10/24 0800   Appearance Dressing in place, unable to visualize 01/10/24 0800   Periwound Area Dry;Intact 01/09/24 1600   Wound Edges Approximated 01/09/24 1600   Care Cleansed with:;Sterile normal saline 01/05/24 0800   Dressing Gauze 01/09/24 0800   Number of days: 12         Musculoskeletal:    Transfer assist: Activity did not occur    Weight Bearing Status: N/A    Comments:  ONLY NONPURPOSEFUL  MVT OF RUE - NOT FOLLOWING COMMANDS    ADL Assist: Activity did not occur    Special Equipment: N/A    Radiology:    Radiology (Last 168 hours)               01/08 1346 X-Ray Chest 1 View                X-Ray Chest 1 View  Narrative: EXAMINATION:  XR CHEST 1 VIEW    CPT 09488    CLINICAL HISTORY:  respiratory failure;    COMPARISON:  December 31, 2023    FINDINGS:  Examination reveals mediastinal silhouette to be within normal limits cardiac silhouette is enlarged there is increased left retrocardiac density and silhouetting of the left hemidiaphragm which might be related to an infiltrate/atelectasis.    Slightly more confluent airspace opacities identified in the right infrahilar region and right cardiophrenic angle region early infiltrate can not be completely excluded.    Tracheostomy cannula is seen with the tip above the jackie  Impression: Cardiomegaly with increased left retrocardiac density and silhouetting of the left hemidiaphragm.    Slightly more confluent opacities in the right infrahilar region and right cardiophrenic angle region.    No other change    Electronically signed by: Vik Keen  Date:    01/08/2024  Time:    13:49      Lab/Cultures:  Significant Labs:        Lab Results   Component Value Date     WBC 7.26 01/10/2024     HGB 10.9 (L) 01/10/2024     HCT 35.0 (L) 01/10/2024     MCV 91.9 01/10/2024      01/10/2024         BMP        Lab Results   Component Value Date      01/10/2024     K 3.9 01/10/2024     CHLORIDE 105 01/10/2024     CO2 30 01/10/2024     BUN 14.5 01/10/2024     CREATININE 0.77 01/10/2024     CALCIUM 7.9 (L) 01/10/2024     AGAP 7.0 01/04/2024     EGFRNONAA 61 04/23/2022      Blood Urea Nitrogen   Date Value Ref Range Status   01/10/2024 14.5 8.4 - 25.7 mg/dL Final   01/09/2024 15.5 8.4 - 25.7 mg/dL Final     Creatinine   Date Value Ref Range Status   01/10/2024 0.77 0.73 - 1.18 mg/dL Final   01/09/2024 0.82 0.73 - 1.18 mg/dL Final     WBC   Date Value Ref  Range Status   01/10/2024 7.26 4.50 - 11.50 x10(3)/mcL Final   01/09/2024 8.14 4.50 - 11.50 x10(3)/mcL Final   12/20/2023 25.65 x10(3)/mcL Final      CULTURE, BLOOD ([facility])   Date Value Ref Range Status   12/23/2023 No Growth at 5 days  Final   12/23/2023 No Growth at 5 days  Final     Urine Culture   Date Value Ref Range Status   12/23/2023 No Significant Growth  Final     Recent Labs     01/10/24  0925   PH 7.520*   PCO2 44.0   PO2 77.0*   HCO3 35.9*

## 2024-01-11 LAB
ALBUMIN SERPL-MCNC: 2.1 G/DL (ref 3.4–4.8)
ALBUMIN/GLOB SERPL: 0.6 RATIO (ref 1.1–2)
ALP SERPL-CCNC: 48 UNIT/L (ref 40–150)
ALT SERPL-CCNC: 27 UNIT/L (ref 0–55)
AST SERPL-CCNC: 23 UNIT/L (ref 5–34)
BASOPHILS # BLD AUTO: 0.03 X10(3)/MCL
BASOPHILS NFR BLD AUTO: 0.4 %
BILIRUB SERPL-MCNC: 0.5 MG/DL
BUN SERPL-MCNC: 13.8 MG/DL (ref 8.4–25.7)
CALCIUM SERPL-MCNC: 7.6 MG/DL (ref 8.8–10)
CHLORIDE SERPL-SCNC: 104 MMOL/L (ref 98–107)
CO2 SERPL-SCNC: 32 MMOL/L (ref 23–31)
CREAT SERPL-MCNC: 0.69 MG/DL (ref 0.73–1.18)
EOSINOPHIL # BLD AUTO: 0.37 X10(3)/MCL (ref 0–0.9)
EOSINOPHIL NFR BLD AUTO: 5.4 %
ERYTHROCYTE [DISTWIDTH] IN BLOOD BY AUTOMATED COUNT: 14.5 % (ref 11.5–17)
GFR SERPLBLD CREATININE-BSD FMLA CKD-EPI: >60 MLS/MIN/1.73/M2
GLOBULIN SER-MCNC: 3.5 GM/DL (ref 2.4–3.5)
GLUCOSE SERPL-MCNC: 123 MG/DL (ref 82–115)
HCT VFR BLD AUTO: 32.6 % (ref 42–52)
HGB BLD-MCNC: 10.1 G/DL (ref 14–18)
IMM GRANULOCYTES # BLD AUTO: 0.05 X10(3)/MCL (ref 0–0.04)
IMM GRANULOCYTES NFR BLD AUTO: 0.7 %
LYMPHOCYTES # BLD AUTO: 1.21 X10(3)/MCL (ref 0.6–4.6)
LYMPHOCYTES NFR BLD AUTO: 17.7 %
MAGNESIUM SERPL-MCNC: 2 MG/DL (ref 1.6–2.6)
MCH RBC QN AUTO: 28.1 PG (ref 27–31)
MCHC RBC AUTO-ENTMCNC: 31 G/DL (ref 33–36)
MCV RBC AUTO: 90.6 FL (ref 80–94)
MONOCYTES # BLD AUTO: 0.62 X10(3)/MCL (ref 0.1–1.3)
MONOCYTES NFR BLD AUTO: 9.1 %
NEUTROPHILS # BLD AUTO: 4.57 X10(3)/MCL (ref 2.1–9.2)
NEUTROPHILS NFR BLD AUTO: 66.7 %
NRBC BLD AUTO-RTO: 0 %
PLATELET # BLD AUTO: 245 X10(3)/MCL (ref 130–400)
PMV BLD AUTO: 11.1 FL (ref 7.4–10.4)
POTASSIUM SERPL-SCNC: 3.5 MMOL/L (ref 3.5–5.1)
PROT SERPL-MCNC: 5.6 GM/DL (ref 5.8–7.6)
RBC # BLD AUTO: 3.6 X10(6)/MCL (ref 4.7–6.1)
SODIUM SERPL-SCNC: 142 MMOL/L (ref 136–145)
WBC # SPEC AUTO: 6.85 X10(3)/MCL (ref 4.5–11.5)

## 2024-01-11 PROCEDURE — 94761 N-INVAS EAR/PLS OXIMETRY MLT: CPT

## 2024-01-11 PROCEDURE — 25000003 PHARM REV CODE 250: Performed by: INTERNAL MEDICINE

## 2024-01-11 PROCEDURE — 27200966 HC CLOSED SUCTION SYSTEM

## 2024-01-11 PROCEDURE — 20000000 HC ICU ROOM

## 2024-01-11 PROCEDURE — 63600175 PHARM REV CODE 636 W HCPCS: Performed by: INTERNAL MEDICINE

## 2024-01-11 PROCEDURE — 85025 COMPLETE CBC W/AUTO DIFF WBC: CPT

## 2024-01-11 PROCEDURE — 27100171 HC OXYGEN HIGH FLOW UP TO 24 HOURS

## 2024-01-11 PROCEDURE — 83735 ASSAY OF MAGNESIUM: CPT

## 2024-01-11 PROCEDURE — 94003 VENT MGMT INPAT SUBQ DAY: CPT

## 2024-01-11 PROCEDURE — 80053 COMPREHEN METABOLIC PANEL: CPT

## 2024-01-11 PROCEDURE — 25000003 PHARM REV CODE 250: Performed by: STUDENT IN AN ORGANIZED HEALTH CARE EDUCATION/TRAINING PROGRAM

## 2024-01-11 PROCEDURE — 99900026 HC AIRWAY MAINTENANCE (STAT)

## 2024-01-11 PROCEDURE — 99900035 HC TECH TIME PER 15 MIN (STAT)

## 2024-01-11 RX ADMIN — LOSARTAN POTASSIUM 50 MG: 50 TABLET, FILM COATED ORAL at 08:01

## 2024-01-11 RX ADMIN — GUAIFENESIN 400 MG: 200 SOLUTION ORAL at 08:01

## 2024-01-11 RX ADMIN — DOCUSATE SODIUM 100 MG: 50 LIQUID ORAL at 08:01

## 2024-01-11 RX ADMIN — GUAIFENESIN 400 MG: 200 SOLUTION ORAL at 05:01

## 2024-01-11 RX ADMIN — ENOXAPARIN SODIUM 120 MG: 150 INJECTION SUBCUTANEOUS at 08:01

## 2024-01-11 RX ADMIN — LEVETIRACETAM 1000 MG: 100 SOLUTION ORAL at 08:01

## 2024-01-11 RX ADMIN — ATORVASTATIN CALCIUM 10 MG: 10 TABLET, FILM COATED ORAL at 08:01

## 2024-01-11 RX ADMIN — DEXMEDETOMIDINE HYDROCHLORIDE 0.8 MCG/KG/HR: 4 INJECTION INTRAVENOUS at 08:01

## 2024-01-11 RX ADMIN — FAMOTIDINE 20 MG: 10 INJECTION, SOLUTION INTRAVENOUS at 08:01

## 2024-01-11 RX ADMIN — ACETAMINOPHEN 650 MG: 650 SOLUTION ORAL at 08:01

## 2024-01-11 RX ADMIN — DILTIAZEM HYDROCHLORIDE 240 MG: 60 TABLET, FILM COATED ORAL at 08:01

## 2024-01-11 RX ADMIN — QUETIAPINE FUMARATE 25 MG: 25 TABLET ORAL at 08:01

## 2024-01-11 RX ADMIN — BACITRACIN ZINC, NEOMYCIN, POLYMYXIN B: 400; 3.5; 5 OINTMENT TOPICAL at 08:01

## 2024-01-11 RX ADMIN — GUAIFENESIN 400 MG: 200 SOLUTION ORAL at 02:01

## 2024-01-11 RX ADMIN — DEXMEDETOMIDINE HYDROCHLORIDE 0.8 MCG/KG/HR: 4 INJECTION INTRAVENOUS at 03:01

## 2024-01-11 RX ADMIN — DEXMEDETOMIDINE HYDROCHLORIDE 0.8 MCG/KG/HR: 4 INJECTION INTRAVENOUS at 02:01

## 2024-01-11 RX ADMIN — DEXMEDETOMIDINE HYDROCHLORIDE 0.8 MCG/KG/HR: 4 INJECTION INTRAVENOUS at 06:01

## 2024-01-11 RX ADMIN — CARVEDILOL 3.12 MG: 3.12 TABLET, FILM COATED ORAL at 08:01

## 2024-01-11 RX ADMIN — DEXMEDETOMIDINE HYDROCHLORIDE 0.8 MCG/KG/HR: 4 INJECTION INTRAVENOUS at 10:01

## 2024-01-11 RX ADMIN — ASPIRIN 81 MG CHEWABLE TABLET 81 MG: 81 TABLET CHEWABLE at 08:01

## 2024-01-11 RX ADMIN — ACETAMINOPHEN 650 MG: 650 SOLUTION ORAL at 02:01

## 2024-01-11 NOTE — PROGRESS NOTES
[facility] - [facility] ICU  Pulmonary Critical Care Note    Patient Name: Delio Daniel Jr.  MRN: [id number]  Admission Date: 12/19/2023  Hospital Length of Stay: 23 days  Code Status: Full Code  Attending Provider: Efrain Salcido MD  Primary Care Provider: Candy, Primary Doctor     Subjective:     HPI:   Delio Daniel Jr. is a 68 y.o. 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 His last known normal was 9:15 per EMS. Stroke protocol in ED initiated. Unofficial CT head showed possible dense R MCA. Pt taken to cath lab for BRAD thrombectomy. Admitted to ICU for post-operative care and monitoring.     Hospital Course/Significant events:  12/19/2023 - Admitted to ICU s/p right internal carotid artery thrombectomy  12/20/2023 - 12/20/2023 he had a rapid deterioration in his neurologic status with CT showing, as expected, a large right MCA distribution infarct with marked right-to-left shift. s/p craniectomy  12/29/23 - s/p tracheotomy  1/2/2024- PEG tube placed   1/7/23 - Catheter exchanged    24 Hour Interval History:  No acute events over night. Hypertensive with SBP 150s otherwise vital signs remained stable; afebrile. Remains on ventilation via tracheostomy and sedated on precedex. Vent settings currently on AC mode RR14/PEEP5/FiO2%30. Currently pending placement, awaiting final approval.     Past Medical History:   Diagnosis Date    Arthritis     Atrial fibrillation     BPH (benign prostatic hyperplasia)     Cardiac arrest     Coronary artery disease     Cyst, kidney, acquired     Diverticulosis     Hyperlipidemia     Hypertension     MI  (myocardial infarction)     Obesity     Steatosis of liver      Past Surgical History:   Procedure Laterality Date    A-V CARDIAC PACEMAKER INSERTION Right     CARDIAC CATHETERIZATION      COLONOSCOPY W/ BIOPSIES      CRANIECTOMY Right 12/20/2023    Procedure: CRANIECTOMY;  Surgeon: Artem Can MD;  Location: [facility] OR;  Service: Neurosurgery;  Laterality: Right;    ESOPHAGOGASTRODUODENOSCOPY W/ PEG N/A 1/2/2024    Procedure: PEG;  Surgeon: Tani Day MD;  Location: [facility] ENDOSCOPY;  Service: Gastroenterology;  Laterality: N/A;    excision of colon      TRACHEOSTOMY N/A 12/29/2023    Procedure: CREATION, TRACHEOSTOMY;  Surgeon: Patricia Winslow MD;  Location: [facility] OR;  Service: ENT;  Laterality: N/A;  REQ 1130 //  NEEDS 2 SCRUBS     Social History     Socioeconomic History    Marital status:     Number of children: 9   Occupational History    Occupation: retired   Tobacco Use    Smoking status: Former    Smokeless tobacco: Never   Substance and Sexual Activity    Alcohol use: Not Currently    Drug use: Not Currently    Sexual activity: Not Currently     Partners: Female     Social Determinants of Health     Financial Resource Strain: Low Risk  (10/19/2022)    Overall Financial Resource Strain (CARDIA)     Difficulty of Paying Living Expenses: Not hard at all   Food Insecurity: No Food Insecurity (10/19/2022)    Hunger Vital Sign     Worried About Running Out of Food in the Last Year: Never true     Ran Out of Food in the Last Year: Never true   Transportation Needs: No Transportation Needs (10/19/2022)    PRAPARE - Transportation     Lack of Transportation (Medical): No     Lack of Transportation (Non-Medical): No   Physical Activity: Sufficiently Active (10/19/2022)    Exercise Vital Sign     Days of Exercise per Week: 6 days     Minutes of Exercise per Session: 60 min   Stress: No Stress Concern Present (10/19/2022)    [employer] [place] [employer] - Occupational Stress  Questionnaire     Feeling of Stress : Not at all   Social Connections: Unknown (10/19/2022)    Social Connection and Isolation Panel [NHANES]     Frequency of Communication with Friends and Family: More than three times a week     Frequency of Social Gatherings with Friends and Family: More than three times a week     Attends Buddhism Services: More than 4 times per year     Active Member of Clubs or Organizations: No     Attends Club or Organization Meetings: Never   Housing Stability: Low Risk  (10/19/2022)    Housing Stability Vital Sign     Unable to Pay for Housing in the Last Year: No     Number of Places Lived in the Last Year: 1     Unstable Housing in the Last Year: No     Current Outpatient Medications   Medication Instructions    albuterol (PROVENTIL/VENTOLIN HFA) 90 mcg/actuation inhaler 2 puffs, Inhalation, Every 6 hours PRN, Rescue    aspirin 81 MG Chew chew and swallow 1 tablet by mouth daily    atorvastatin (LIPITOR) 40 mg, Oral, Daily    cetirizine (ZYRTEC) 10 mg, Oral, Daily    diltiaZEM (CARDIZEM CD) 360 mg, Oral, Daily    losartan (COZAAR) 50 mg, Oral, Daily    metoprolol succinate (TOPROL-XL) 200 mg, Oral, 2 times daily    omeprazole (PRILOSEC) 20 mg, Oral, Daily, One tab by mouth daily    potassium chloride (KLOR-CON) 20 mEq Pack 20 mEq, Oral, Daily    spironolactone (ALDACTONE) 50 mg, Oral, Daily    torsemide (DEMADEX) 10 mg, Oral, Daily    vitamin D (VITAMIN D3) 1,000 Units, Oral, Daily    XARELTO 20 mg Tab TAKE 1 TABLET BY MOUTH DAILY with SUPPER     Current Inpatient Medications   aspirin  81 mg Per G Tube Daily    atorvastatin  10 mg Per G Tube Daily    carvediloL  3.125 mg Per G Tube BID    diltiaZEM  240 mg Per G Tube Daily    docusate  100 mg Per G Tube BID    enoxparin  1 mg/kg Subcutaneous Q12H (treatment, non-standard time)    famotidine (PF)  20 mg Intravenous Daily    guaiFENesin 100 mg/5 ml  400 mg Per G Tube Q8H    levetiracetam  1,000 mg Per G Tube BID    losartan  50 mg Per G  Tube Daily    mannitol 20%  75 g Intravenous Once    neomycin-bacitracin-polymyxin   Topical (Top) BID    QUEtiapine  25 mg Per G Tube BID     Current Intravenous Infusions   dexmedeTOMIDine (Precedex) infusion (titrating) 0.8 mcg/kg/hr (01/11/24 0333)    fentanyl Stopped (12/31/23 0634)    NORepinephrine bitartrate-D5W Stopped (12/20/23 1929)       Review of Systems   Unable to perform ROS: Critical illness   All other systems reviewed and are negative.     Objective:       Intake/Output Summary (Last 24 hours) at 1/11/2024 0432  Last data filed at 1/11/2024 0200  Gross per 24 hour   Intake 450.96 ml   Output 2635 ml   Net -2184.04 ml     Vital Signs (Most Recent):  Temp: 99.5 °F (37.5 °C) (01/11/24 0400)  Pulse: 78 (01/11/24 0400)  Resp: 15 (01/11/24 0400)  BP: (!) 125/91 (01/11/24 0400)  SpO2: 97 % (01/11/24 0400)  Body mass index is 36.88 kg/m².  Weight: 119.9 kg (264 lb 5.3 oz) Vital Signs (24h Range):  Temp:  [99.3 °F (37.4 °C)-99.8 °F (37.7 °C)] 99.5 °F (37.5 °C)  Pulse:  [60-91] 78  Resp:  [0-23] 15  SpO2:  [94 %-98 %] 97 %  BP: (111-157)/() 125/91     Physical Exam  General: No acute distress. Sleeping at this timee.  HEENT: Normocephalic, atraumatic. Face symmetric.    Cardiovascular: Regular rate & rhythm  Pulmonary: Bilateral symmetric chest rise, patient mechanically ventilated via trach  Abdominal:  non-distended, soft, round, positive bowel sounds  Extremities: No clubbing or cyanosis.  1+ pitting edema distal LUE  Neuro:   trached on th vent; sedated on precedex.  Patient has non purposeful movements of RUE, does not follow commands.      Lines/Drains/Airways       Drain  Duration                  Urethral Catheter 16 Fr. -- days         Gastrostomy/Enterostomy 01/02/24 1230 LUQ 8 days         Fecal Incontinence  01/07/24 0357 4 days              Airway  Duration             Adult Surgical Airway 12/29/23 1229 12 days              Peripheral Intravenous Line  Duration                   Midline Catheter Insertion/Assessment  - Single Lumen 12/29/23 1400 Right cephalic vein (lateral side of arm) 12 days                    Significant Labs:  Lab Results   Component Value Date    WBC 6.85 01/11/2024    HGB 10.1 (L) 01/11/2024    HCT 32.6 (L) 01/11/2024    MCV 90.6 01/11/2024     01/11/2024       BMP  Lab Results   Component Value Date     01/11/2024    K 3.5 01/11/2024    CHLORIDE 104 01/11/2024    CO2 32 (H) 01/11/2024    BUN 13.8 01/11/2024    CREATININE 0.69 (L) 01/11/2024    CALCIUM 7.6 (L) 01/11/2024    AGAP 7.0 01/04/2024    EGFRNONAA 61 04/23/2022     ABG  Recent Labs   Lab 01/10/24  0925   PH 7.520*   PO2 77.0*   PCO2 44.0   HCO3 35.9*   POCBASEDEF 11.70       Mechanical Ventilation Support:  Vent Mode: A/C (01/11/24 0206)  Set Rate: 14 BPM (01/11/24 0206)  Vt Set: 460 mL (01/11/24 0206)  Pressure Support: 10 cmH20 (01/04/24 2337)  PEEP/CPAP: 5 cmH20 (01/11/24 0206)  Oxygen Concentration (%): 30 (01/11/24 0400)  Peak Airway Pressure: 29 cmH20 (01/11/24 0206)  Total Ve: 9 L/m (01/11/24 0206)  F/VT Ratio<105 (RSBI): (!) 29.47 (01/10/24 1722)      Significant Imaging:  No imaging this AM      Assessment/Plan:     Assessment  CVA s/p right ICA and MCA M3  branch thrombectomy s/p craniectomy with hemorrhagic conversion  Superficial thrombosis in left cephalic vein  Confirmed by DVT U/S on 12/26/2023  Acute hypoxic respiratory failure requiring intubation  Atrial fibrillation w/ RVR-rate now controlled.  Onychomycosis  HX of CAD, HTN, HLD, ADA  Hypernatremia  Urine osmolality indicative of extrarenal process, likely 2/2 NG tube as well as lack of intake      Plan  - Free water flushes to PEG Currently at 210 ml q 4 hours. Continue TF as tolerated.   -Continue efforts to wean sedation and mechanical ventilation wean with trach as tolerated  -Bowel regimen (Miralax TID and Senna BID) stopped due to excessive number of BM   -Neurosurgery signed off on 1/3/24   -Bone flap precautions and  continue helmet use out of bed   -Seizure precautions and continue Keppra BID via Gtube   -Will require outpatient referral from placement back to outpatient neurosurgery office for consideration of cranioplasty   -Gastroenterology signed off on 1/3/24   -Continue Tfs and PEG care   -Neurology signed off on 12/22/23   -SBP goal <160    -Continue ASA 81mg qd   -Cardiology signed off on 12/25/23   -Continue Diltiazem 240mg qd, Losartan and Metoprolol tartrate 100mg BID    -Lopressor IV PRN for HR>120   -Remains on full-dose Lovenox for left cephalic vein thrombosis  -Awaiting placement at LTAC Will follow up with CM.   -Urology signed off 1/9/24   -Continue antibiotic ointment with underwood care  -elevation/ice    DVT ppx: FD Lovenox   GI ppx: Casey Barlow MD   Pulmonary Critical Care Medicine  [facility] - [facility] ICU  DOS: 01/11/2024

## 2024-01-11 NOTE — CARE UPDATE
[id number] Rec call from Ning with [facility] who reported pt's ltac was approved. Auth #[id number]. Informed monica with [facility] that pt rec auth for ltac. She will inform Perry since she has been working on the case.   unk

## 2024-01-11 NOTE — NURSING
Nurses Note -- 4 Eyes      1/11/2024   2:23 AM      Skin assessed during: Daily Assessment      [x] No Altered Skin Integrity Present    [x]Prevention Measures Documented      [] Yes- Altered Skin Integrity Present or Discovered   [] LDA Added if Not in Epic (Describe Wound)   [] New Altered Skin Integrity was Present on Admit and Documented in LDA   [] Wound Image Taken    Wound Care Consulted? No    Attending Nurse:  Demi Johnson RN/Staff Member:  Karthik

## 2024-01-12 PROBLEM — J96.01 ACUTE HYPOXIC RESPIRATORY FAILURE: Status: ACTIVE | Noted: 2024-01-12

## 2024-01-12 LAB
ALBUMIN SERPL-MCNC: 2.2 G/DL (ref 3.4–4.8)
ALBUMIN/GLOB SERPL: 0.6 RATIO (ref 1.1–2)
ALP SERPL-CCNC: 50 UNIT/L (ref 40–150)
ALT SERPL-CCNC: 28 UNIT/L (ref 0–55)
APPEARANCE UR: ABNORMAL
AST SERPL-CCNC: 27 UNIT/L (ref 5–34)
BACTERIA #/AREA URNS AUTO: ABNORMAL /HPF
BASOPHILS # BLD AUTO: 0.02 X10(3)/MCL
BASOPHILS NFR BLD AUTO: 0.2 %
BILIRUB SERPL-MCNC: 0.5 MG/DL
BILIRUB UR QL STRIP.AUTO: NEGATIVE
BUN SERPL-MCNC: 13.3 MG/DL (ref 8.4–25.7)
CALCIUM SERPL-MCNC: 7.8 MG/DL (ref 8.8–10)
CHLORIDE SERPL-SCNC: 102 MMOL/L (ref 98–107)
CO2 SERPL-SCNC: 29 MMOL/L (ref 23–31)
COLOR UR AUTO: YELLOW
CREAT SERPL-MCNC: 0.78 MG/DL (ref 0.73–1.18)
EOSINOPHIL # BLD AUTO: 0.22 X10(3)/MCL (ref 0–0.9)
EOSINOPHIL NFR BLD AUTO: 1.8 %
ERYTHROCYTE [DISTWIDTH] IN BLOOD BY AUTOMATED COUNT: 14.6 % (ref 11.5–17)
GFR SERPLBLD CREATININE-BSD FMLA CKD-EPI: >60 MLS/MIN/1.73/M2
GLOBULIN SER-MCNC: 3.7 GM/DL (ref 2.4–3.5)
GLUCOSE SERPL-MCNC: 144 MG/DL (ref 82–115)
GLUCOSE UR QL STRIP.AUTO: NORMAL
HCT VFR BLD AUTO: 33.3 % (ref 42–52)
HGB BLD-MCNC: 10.5 G/DL (ref 14–18)
IMM GRANULOCYTES # BLD AUTO: 0.06 X10(3)/MCL (ref 0–0.04)
IMM GRANULOCYTES NFR BLD AUTO: 0.5 %
KETONES UR QL STRIP.AUTO: NEGATIVE
LEUKOCYTE ESTERASE UR QL STRIP.AUTO: 500
LYMPHOCYTES # BLD AUTO: 1.74 X10(3)/MCL (ref 0.6–4.6)
LYMPHOCYTES NFR BLD AUTO: 14.4 %
MAGNESIUM SERPL-MCNC: 2 MG/DL (ref 1.6–2.6)
MCH RBC QN AUTO: 28.4 PG (ref 27–31)
MCHC RBC AUTO-ENTMCNC: 31.5 G/DL (ref 33–36)
MCV RBC AUTO: 90 FL (ref 80–94)
MONOCYTES # BLD AUTO: 0.75 X10(3)/MCL (ref 0.1–1.3)
MONOCYTES NFR BLD AUTO: 6.2 %
MUCOUS THREADS URNS QL MICRO: ABNORMAL /LPF
NEUTROPHILS # BLD AUTO: 9.31 X10(3)/MCL (ref 2.1–9.2)
NEUTROPHILS NFR BLD AUTO: 76.9 %
NITRITE UR QL STRIP.AUTO: ABNORMAL
NRBC BLD AUTO-RTO: 0 %
PH UR STRIP.AUTO: 6 [PH]
PLATELET # BLD AUTO: 236 X10(3)/MCL (ref 130–400)
PMV BLD AUTO: 11.2 FL (ref 7.4–10.4)
POTASSIUM SERPL-SCNC: 3.4 MMOL/L (ref 3.5–5.1)
PROT SERPL-MCNC: 5.9 GM/DL (ref 5.8–7.6)
PROT UR QL STRIP.AUTO: ABNORMAL
RBC # BLD AUTO: 3.7 X10(6)/MCL (ref 4.7–6.1)
RBC #/AREA URNS AUTO: ABNORMAL /HPF
RBC UR QL AUTO: ABNORMAL
SODIUM SERPL-SCNC: 139 MMOL/L (ref 136–145)
SP GR UR STRIP.AUTO: 1.02 (ref 1–1.03)
SQUAMOUS #/AREA URNS LPF: ABNORMAL /HPF
UROBILINOGEN UR STRIP-ACNC: NORMAL
WBC # SPEC AUTO: 12.1 X10(3)/MCL (ref 4.5–11.5)
WBC #/AREA URNS AUTO: ABNORMAL /HPF

## 2024-01-12 PROCEDURE — 81001 URINALYSIS AUTO W/SCOPE: CPT | Performed by: INTERNAL MEDICINE

## 2024-01-12 PROCEDURE — 25000003 PHARM REV CODE 250: Performed by: INTERNAL MEDICINE

## 2024-01-12 PROCEDURE — 83735 ASSAY OF MAGNESIUM: CPT

## 2024-01-12 PROCEDURE — 99900022

## 2024-01-12 PROCEDURE — 85025 COMPLETE CBC W/AUTO DIFF WBC: CPT

## 2024-01-12 PROCEDURE — 87077 CULTURE AEROBIC IDENTIFY: CPT | Performed by: INTERNAL MEDICINE

## 2024-01-12 PROCEDURE — 27200966 HC CLOSED SUCTION SYSTEM

## 2024-01-12 PROCEDURE — 63600175 PHARM REV CODE 636 W HCPCS: Performed by: INTERNAL MEDICINE

## 2024-01-12 PROCEDURE — 25000003 PHARM REV CODE 250: Performed by: STUDENT IN AN ORGANIZED HEALTH CARE EDUCATION/TRAINING PROGRAM

## 2024-01-12 PROCEDURE — 99900026 HC AIRWAY MAINTENANCE (STAT)

## 2024-01-12 PROCEDURE — 87086 URINE CULTURE/COLONY COUNT: CPT | Performed by: INTERNAL MEDICINE

## 2024-01-12 PROCEDURE — 94761 N-INVAS EAR/PLS OXIMETRY MLT: CPT

## 2024-01-12 PROCEDURE — 87040 BLOOD CULTURE FOR BACTERIA: CPT | Performed by: INTERNAL MEDICINE

## 2024-01-12 PROCEDURE — 94003 VENT MGMT INPAT SUBQ DAY: CPT

## 2024-01-12 PROCEDURE — 27100171 HC OXYGEN HIGH FLOW UP TO 24 HOURS

## 2024-01-12 PROCEDURE — 87154 CUL TYP ID BLD PTHGN 6+ TRGT: CPT | Performed by: INTERNAL MEDICINE

## 2024-01-12 PROCEDURE — 80053 COMPREHEN METABOLIC PANEL: CPT

## 2024-01-12 PROCEDURE — 99900035 HC TECH TIME PER 15 MIN (STAT)

## 2024-01-12 PROCEDURE — 20000000 HC ICU ROOM

## 2024-01-12 RX ORDER — GUAIFENESIN 100 MG/5ML
400 SOLUTION ORAL EVERY 8 HOURS
Qty: 473 ML | Refills: 0 | Status: ON HOLD | OUTPATIENT
Start: 2024-01-12 | End: 2024-01-24 | Stop reason: HOSPADM

## 2024-01-12 RX ORDER — CARVEDILOL 3.12 MG/1
3.12 TABLET ORAL 2 TIMES DAILY
Qty: 60 TABLET | Refills: 11 | Status: ON HOLD | OUTPATIENT
Start: 2024-01-12 | End: 2024-01-24 | Stop reason: HOSPADM

## 2024-01-12 RX ORDER — LOSARTAN POTASSIUM 50 MG/1
50 TABLET ORAL DAILY
Qty: 90 TABLET | Refills: 3 | Status: ON HOLD | OUTPATIENT
Start: 2024-01-13 | End: 2024-03-04 | Stop reason: HOSPADM

## 2024-01-12 RX ORDER — DILTIAZEM HYDROCHLORIDE 120 MG/1
240 TABLET, FILM COATED ORAL DAILY
Qty: 60 TABLET | Refills: 11 | Status: ON HOLD | OUTPATIENT
Start: 2024-01-13 | End: 2024-03-04 | Stop reason: HOSPADM

## 2024-01-12 RX ORDER — ATORVASTATIN CALCIUM 10 MG/1
10 TABLET, FILM COATED ORAL DAILY
Qty: 90 TABLET | Refills: 3 | Status: ON HOLD | OUTPATIENT
Start: 2024-01-13 | End: 2024-03-04

## 2024-01-12 RX ORDER — QUETIAPINE FUMARATE 25 MG/1
25 TABLET, FILM COATED ORAL 2 TIMES DAILY
Qty: 60 TABLET | Refills: 11 | Status: ON HOLD | OUTPATIENT
Start: 2024-01-12 | End: 2024-03-04

## 2024-01-12 RX ORDER — NAPROXEN SODIUM 220 MG/1
81 TABLET, FILM COATED ORAL DAILY
Qty: 30 TABLET | Refills: 11 | Status: ON HOLD | OUTPATIENT
Start: 2024-01-13 | End: 2024-03-04

## 2024-01-12 RX ORDER — POLYETHYLENE GLYCOL 3350 17 G/17G
17 POWDER, FOR SOLUTION ORAL DAILY PRN
Qty: 20 EACH | Refills: 0 | Status: ON HOLD | OUTPATIENT
Start: 2024-01-12 | End: 2024-03-04

## 2024-01-12 RX ADMIN — ATORVASTATIN CALCIUM 10 MG: 10 TABLET, FILM COATED ORAL at 08:01

## 2024-01-12 RX ADMIN — LOSARTAN POTASSIUM 50 MG: 50 TABLET, FILM COATED ORAL at 08:01

## 2024-01-12 RX ADMIN — GUAIFENESIN 400 MG: 200 SOLUTION ORAL at 08:01

## 2024-01-12 RX ADMIN — LEVETIRACETAM 1000 MG: 100 SOLUTION ORAL at 08:01

## 2024-01-12 RX ADMIN — DOCUSATE SODIUM 100 MG: 50 LIQUID ORAL at 08:01

## 2024-01-12 RX ADMIN — ENOXAPARIN SODIUM 120 MG: 150 INJECTION SUBCUTANEOUS at 08:01

## 2024-01-12 RX ADMIN — DILTIAZEM HYDROCHLORIDE 240 MG: 60 TABLET, FILM COATED ORAL at 08:01

## 2024-01-12 RX ADMIN — QUETIAPINE FUMARATE 25 MG: 25 TABLET ORAL at 08:01

## 2024-01-12 RX ADMIN — BACITRACIN ZINC, NEOMYCIN, POLYMYXIN B: 400; 3.5; 5 OINTMENT TOPICAL at 08:01

## 2024-01-12 RX ADMIN — FAMOTIDINE 20 MG: 10 INJECTION, SOLUTION INTRAVENOUS at 08:01

## 2024-01-12 RX ADMIN — CARVEDILOL 3.12 MG: 3.12 TABLET, FILM COATED ORAL at 08:01

## 2024-01-12 RX ADMIN — DEXMEDETOMIDINE HYDROCHLORIDE 0.8 MCG/KG/HR: 4 INJECTION INTRAVENOUS at 03:01

## 2024-01-12 RX ADMIN — DEXMEDETOMIDINE HYDROCHLORIDE 0.8 MCG/KG/HR: 4 INJECTION INTRAVENOUS at 11:01

## 2024-01-12 RX ADMIN — GUAIFENESIN 400 MG: 200 SOLUTION ORAL at 05:01

## 2024-01-12 RX ADMIN — ASPIRIN 81 MG CHEWABLE TABLET 81 MG: 81 TABLET CHEWABLE at 08:01

## 2024-01-12 RX ADMIN — DEXMEDETOMIDINE HYDROCHLORIDE 0.8 MCG/KG/HR: 4 INJECTION INTRAVENOUS at 09:01

## 2024-01-12 RX ADMIN — DEXMEDETOMIDINE HYDROCHLORIDE 0.8 MCG/KG/HR: 4 INJECTION INTRAVENOUS at 02:01

## 2024-01-12 RX ADMIN — GUAIFENESIN 400 MG: 200 SOLUTION ORAL at 01:01

## 2024-01-12 NOTE — NURSING
Nurses Note -- 4 Eyes      1/12/2024   11:48 AM      Skin assessed during: Q Shift Change      [x] No Altered Skin Integrity Present    [x]Prevention Measures Documented      [] Yes- Altered Skin Integrity Present or Discovered   [] LDA Added if Not in Epic (Describe Wound)   [] New Altered Skin Integrity was Present on Admit and Documented in LDA   [] Wound Image Taken    Wound Care Consulted? No    Attending Nurse:  Carleen Johnson RN/Staff Member:  MÓNICA Hunter

## 2024-01-12 NOTE — PROGRESS NOTES
Inpatient Nutrition Assessment    Admit Date: 12/19/2023   Total duration of encounter: 24 days   Patient Age: 68 y.o.    Nutrition Recommendation/Prescription     Tube feeding recommendation:    Impact Peptide 1.5 goal rate 50 ml/hr + 2 packets ProSource TF20 daily to provide  1660 kcal/d (108% est needs)  134 g protein/d (85% est needs)  770 ml free water/d (28% est needs)  (calculations based on estimated 20 hr/d run time)     With free water flushes of 210 q4hr, total water: 2030kcal (75% est needs)    Communication of Recommendations: reviewed with nurse    Nutrition Assessment     Malnutrition Assessment/Nutrition-Focused Physical Exam    Malnutrition Context: acute illness or injury (12/21/23 1406)  Malnutrition Level:  (does not meet criteria) (12/21/23 1406)  Energy Intake (Malnutrition):  (unable to eval) (12/21/23 1406)  Weight Loss (Malnutrition):  (unable to eval) (12/21/23 1406)  Subcutaneous Fat (Malnutrition):  (does not meet criteria) (12/21/23 1406)           Muscle Mass (Malnutrition):  (does not meet criteria) (12/21/23 1406)                          Fluid Accumulation (Malnutrition):  (does not meet criteria) (12/21/23 1406)        A minimum of two characteristics is recommended for diagnosis of either severe or non-severe malnutrition.    Chart Review    Reason Seen: continuous nutrition monitoring and follow-up    Malnutrition Screening Tool Results   Have you recently lost weight without trying?: No  Have you been eating poorly because of a decreased appetite?: No   MST Score: 0   Diagnosis:  CVA s/p right ICA and MCA M3  branch thrombectomy s/p craniectomy with hemorrhagic conversion  Acute hypoxic respiratory failure  Atrial fibrillation    Relevant Medical History: Afib, HTN, CAD, CAD (STEMI 2003), HFpEF     Scheduled Medications:  aspirin, 81 mg, Daily  atorvastatin, 10 mg, Daily  carvediloL, 3.125 mg, BID  diltiaZEM, 240 mg, Daily  docusate, 100 mg, BID  enoxparin, 1 mg/kg, Q12H  (treatment, non-standard time)  famotidine (PF), 20 mg, Daily  guaiFENesin 100 mg/5 ml, 400 mg, Q8H  levetiracetam, 1,000 mg, BID  losartan, 50 mg, Daily  mannitol 20%, 75 g, Once  neomycin-bacitracin-polymyxin, , BID  QUEtiapine, 25 mg, BID    Continuous Infusions:  dexmedeTOMIDine (Precedex) infusion (titrating), Last Rate: 0.8 mcg/kg/hr (01/12/24 1501)  fentanyl, Last Rate: Stopped (12/31/23 0634)  NORepinephrine bitartrate-D5W, Last Rate: Stopped (12/20/23 1929)    PRN Medications: 0.9%  NaCl infusion (for blood administration), acetaminophen, dextrose 10%, dextrose 10%, fentaNYL, glucagon (human recombinant), hydrALAZINE, insulin aspart U-100, ipratropium, labetalol, levalbuterol, methyl salicylate, metoprolol, ondansetron, polyethylene glycol, sodium chloride 0.9%    Calorie Containing IV Medications: no significant kcals from medications at this time    Recent Labs   Lab 01/09/24  0159 01/10/24  0237 01/11/24  0129 01/12/24  0115    143 142 139   K 3.5 3.9 3.5 3.4*   CALCIUM 8.2* 7.9* 7.6* 7.8*   MG 2.20 2.10 2.00 2.00   CHLORIDE 105 105 104 102   CO2 31 30 32* 29   BUN 15.5 14.5 13.8 13.3   CREATININE 0.82 0.77 0.69* 0.78   EGFRNORACEVR >60 >60 >60 >60   GLUCOSE 118* 117* 123* 144*   BILITOT 0.6 0.5 0.5 0.5   ALKPHOS 53 47 48 50   ALT 42 36 27 28   AST 35* 35* 23 27   ALBUMIN 2.3* 2.1* 2.1* 2.2*   WBC 8.14 7.26 6.85 12.10*   HGB 10.3* 10.9* 10.1* 10.5*   HCT 32.8* 35.0* 32.6* 33.3*      Nutrition Orders:  Diet NPO      Appetite/Oral Intake: NPO/not applicable  Factors Affecting Nutritional Intake: on mechanical ventilation and tracheostomy  Food/Roman [employer]/Cultural Preferences: unable to obtain  Food Allergies: none reported  Last Bowel Movement: 01/12/24  Wound(s):  incision noted    Comments    12/21/23: Discussed with RN. Will provide tube feeding recommendations for when appropriate to start tube feeding. Receiving kcal from meds.      12/22/23: Patient remains intubated, receiving kcal from meds.  "Cleviprex d/c'ed this morning, Diprivan continues. Patient currently with OG tube to LIS.     12/23/23: Pt with significant output via NG tube. Propofol infusion increased and Precedex started. Will leave TPN recommendations if Tube feeds are unable to be initiated by tomorrow.     12/26/23: TF continues, tolerated per RN. Noted no longer receiving kcal from meds. Will update goal rate to continue to meet est needs.     12/29/23: TF on hold for trach placement today. Pt recently returned, NG to be placed per RN. Once pt weaned off vent, will update TF and est needs.     1/2/24: TF on hold for PEG placement today. Plans to restart TF. Will update FWF to more closely meet est fluid needs.     1/5/24: TF continues @ goal rate. No kcal from meds.     1/9/24: TF on hold. Pt continues to cough and TF coming up. Discussed possibly needing reglan with RN? Will also change to different formula in order to not have such a high goal rate.     1/12/24: RN reports patient tolerating tube feeding at goal rate. Patient receiving Pivot 1.5 as substitute for Impact Peptide 1.5 due to product supply shortage. No kcals from meds. Pending possible transfer to LTAC.    Anthropometrics    Height: 5' 10.98" (180.3 cm),    Last Weight: 119.9 kg (264 lb 5.3 oz) (12/29/23 0541), Weight Method: Bed Scale  BMI (Calculated): 36.9  BMI Classification: obese grade I (BMI 30-34.9)        Ideal Body Weight (IBW), Male: 171.88 lb     % Ideal Body Weight, Male (lb): 140.99 %                          Usual Weight Provided By: unable to obtain usual weight    Wt Readings from Last 5 Encounters:   12/29/23 119.9 kg (264 lb 5.3 oz)   12/11/23 112.7 kg (248 lb 7.3 oz)   12/05/23 112.3 kg (247 lb 9.6 oz)   11/07/23 109.5 kg (241 lb 6.5 oz)   10/30/23 113.9 kg (251 lb 1.7 oz)     Weight Change(s) Since Admission:   Wt Readings from Last 1 Encounters:   12/29/23 0541 119.9 kg (264 lb 5.3 oz)   12/28/23 0600 116.9 kg (257 lb 11.5 oz)   12/27/23 0600 116.9 kg " (257 lb 11.5 oz)   12/26/23 0600 116.1 kg (255 lb 15.3 oz)   12/24/23 0544 111.1 kg (244 lb 14.9 oz)   12/19/23 1645 110 kg (242 lb 8.1 oz)   12/19/23 1053 110 kg (242 lb 8.1 oz)   Admit Weight: 110 kg (242 lb 8.1 oz) (12/19/23 1053), Weight Method: Bed Scale    Estimated Needs    Weight Used For Calorie Calculations: 110 kg (242 lb 8.1 oz)  Energy Calorie Requirements (kcal): 1210-1540kcal (11-14kcal/kg)  Energy Need Method: Kcal/kg  Weight Used For Protein Calculations: 78.2 kg (172 lb 6.4 oz) (IBW)  Protein Requirements: 157gm (2g/kg IBW)  Fluid Requirements (mL): 2750mL (25mL/kg CBW) or per MD    Enteral Nutrition     Formula:  Pivot 1.5 (substitute for Impact Peptide 1.5)  Rate/Volume: 50ml/hr  Water Flushes: 210ml q4hr  Additives/Modulars: Prosource TF20 x2 daily  Route: PEG tube  Method: continuous  Total Nutrition Provided by Tube Feeding, Additives, and Flushes:  Calories Provided  1660 kcal/d, 108% needs   Protein Provided  134 g/d, 85% needs   Fluid Provided  2010 ml/d, 74% needs   Continuous feeding calculations based on estimated 20 hr/d run time unless otherwise stated.    Parenteral Nutrition     Patient not receiving parenteral nutrition support at this time.    Evaluation of Received Nutrient Intake    Calories: meeting estimated needs  Protein: meeting estimated needs    Patient Education     Not applicable.    Nutrition Diagnosis     PES: Inadequate oral intake related to acute illness as evidenced by intubation/trach since 12/19/23. (active)     Nutrition Interventions     Intervention(s): collaboration with other providers    Goal: Meet greater than 80% of nutritional needs by follow-up. (goal met)  Goal: Tolerate enteral feeding at goal rate by follow-up. (goal met)    Nutrition Goals & Monitoring     Dietitian will monitor: energy intake    Nutrition Risk/Follow-Up: high (follow-up in 1-4 days)   Please consult if re-assessment needed sooner.

## 2024-01-12 NOTE — PROGRESS NOTES
[facility] - [facility] ICU  Pulmonary Critical Care Note    Patient Name: Delio Daniel Jr.  MRN: [id number]  Admission Date: 12/19/2023  Hospital Length of Stay: 24 days  Code Status: Full Code  Attending Provider: Efrain Salcido MD  Primary Care Provider: Candy, Primary Doctor     Subjective:     HPI:   Delio Daniel Jr. is a 68 y.o. 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 His last known normal was 9:15 per EMS. Stroke protocol in ED initiated. Unofficial CT head showed possible dense R MCA. Pt taken to cath lab for BRAD thrombectomy. Admitted to ICU for post-operative care and monitoring.     Hospital Course/Significant events:  12/19/2023 - Admitted to ICU s/p right internal carotid artery thrombectomy  12/20/2023 - 12/20/2023 he had a rapid deterioration in his neurologic status with CT showing, as expected, a large right MCA distribution infarct with marked right-to-left shift. s/p craniectomy  12/29/23 - s/p tracheotomy  1/2/2024- PEG tube placed   1/7/23 - Catheter exchanged    24 Hour Interval History:  No acute events over night. Hypertensive with SBP 150s otherwise vital signs remained stable; afebrile. Remains on ventilation via tracheostomy and sedated on precedex. Vent settings changed yesterday; SIMV with decreased rate given alkalosis. Has been approved for LTAC placement.     Past Medical History:   Diagnosis Date    Arthritis     Atrial fibrillation     BPH (benign prostatic hyperplasia)     Cardiac arrest     Coronary artery disease     Cyst, kidney, acquired     Diverticulosis     Hyperlipidemia     Hypertension     MI  (myocardial infarction)     Obesity     Steatosis of liver      Past Surgical History:   Procedure Laterality Date    A-V CARDIAC PACEMAKER INSERTION Right     CARDIAC CATHETERIZATION      COLONOSCOPY W/ BIOPSIES      CRANIECTOMY Right 12/20/2023    Procedure: CRANIECTOMY;  Surgeon: Artem Can MD;  Location: [facility] OR;  Service: Neurosurgery;  Laterality: Right;    ESOPHAGOGASTRODUODENOSCOPY W/ PEG N/A 1/2/2024    Procedure: PEG;  Surgeon: Tani Day MD;  Location: [facility] END[facility]COPY;  Service: Gastroenterology;  Laterality: N/A;    excision of colon      TRACHEOSTOMY N/A 12/29/2023    Procedure: CREATION, TRACHEOSTOMY;  Surgeon: Patricia Winslow MD;  Location: [facility] OR;  Service: ENT;  Laterality: N/A;  REQ 1130 //  NEEDS 2 SCRUBS     Social History     Socioeconomic History    Marital status:     Number of children: 9   Occupational History    Occupation: retired   Tobacco Use    Smoking status: Former    Smokeless tobacco: Never   Substance and Sexual Activity    Alcohol use: Not Currently    Drug use: Not Currently    Sexual activity: Not Currently     Partners: Female     Social Determinants of Health     Financial Resource Strain: Low Risk  (10/19/2022)    Overall Financial Resource Strain (CARDIA)     Difficulty of Paying Living Expenses: Not hard at all   Food Insecurity: No Food Insecurity (10/19/2022)    Hunger Vital Sign     Worried About Running Out of Food in the Last Year: Never true     Ran Out of Food in the Last Year: Never true   Transportation Needs: No Transportation Needs (10/19/2022)    PRAPARE - Transportation     Lack of Transportation (Medical): No     Lack of Transportation (Non-Medical): No   Physical Activity: Sufficiently Active (10/19/2022)    Exercise Vital Sign     Days of Exercise per Week: 6 days     Minutes of Exercise per Session: 60 min   Stress: No Stress Concern Present (10/19/2022)    Citizen of the [place] of Occupational Health - Occupational Stress  Questionnaire     Feeling of Stress : Not at all   Social Connections: Unknown (10/19/2022)    Social Connection and Isolation Panel [NHANES]     Frequency of Communication with Friends and Family: More than three times a week     Frequency of Social Gatherings with Friends and Family: More than three times a week     Attends Faith Services: More than 4 times per year     Active Member of Clubs or Organizations: No     Attends Club or Organization Meetings: Never   Housing Stability: Low Risk  (10/19/2022)    Housing Stability Vital Sign     Unable to Pay for Housing in the Last Year: No     Number of Places Lived in the Last Year: 1     Unstable Housing in the Last Year: No     Current Outpatient Medications   Medication Instructions    albuterol (PROVENTIL/VENTOLIN HFA) 90 mcg/actuation inhaler 2 puffs, Inhalation, Every 6 hours PRN, Rescue    aspirin 81 MG Chew chew and swallow 1 tablet by mouth daily    atorvastatin (LIPITOR) 40 mg, Oral, Daily    cetirizine (ZYRTEC) 10 mg, Oral, Daily    diltiaZEM (CARDIZEM CD) 360 mg, Oral, Daily    losartan (COZAAR) 50 mg, Oral, Daily    metoprolol succinate (TOPROL-XL) 200 mg, Oral, 2 times daily    omeprazole (PRILOSEC) 20 mg, Oral, Daily, One tab by mouth daily    potassium chloride (KLOR-CON) 20 mEq Pack 20 mEq, Oral, Daily    spironolactone (ALDACTONE) 50 mg, Oral, Daily    torsemide (DEMADEX) 10 mg, Oral, Daily    vitamin D (VITAMIN D3) 1,000 Units, Oral, Daily    XARELTO 20 mg Tab TAKE 1 TABLET BY MOUTH DAILY with SUPPER     Current Inpatient Medications   aspirin  81 mg Per G Tube Daily    atorvastatin  10 mg Per G Tube Daily    carvediloL  3.125 mg Per G Tube BID    diltiaZEM  240 mg Per G Tube Daily    docusate  100 mg Per G Tube BID    enoxparin  1 mg/kg Subcutaneous Q12H (treatment, non-standard time)    famotidine (PF)  20 mg Intravenous Daily    guaiFENesin 100 mg/5 ml  400 mg Per G Tube Q8H    levetiracetam  1,000 mg Per G Tube BID    losartan  50 mg Per G  Tube Daily    mannitol 20%  75 g Intravenous Once    neomycin-bacitracin-polymyxin   Topical (Top) BID    QUEtiapine  25 mg Per G Tube BID     Current Intravenous Infusions   dexmedeTOMIDine (Precedex) infusion (titrating) 0.8 mcg/kg/hr (01/11/24 2235)    fentanyl Stopped (12/31/23 0634)    NORepinephrine bitartrate-D5W Stopped (12/20/23 1929)       Review of Systems   Unable to perform ROS: Critical illness   All other systems reviewed and are negative.     Objective:       Intake/Output Summary (Last 24 hours) at 1/12/2024 0232  Last data filed at 1/12/2024 0000  Gross per 24 hour   Intake 1490.64 ml   Output 1860 ml   Net -369.36 ml       Vital Signs (Most Recent):  Temp: 100.1 °F (37.8 °C) (01/12/24 0000)  Pulse: 94 (01/12/24 0100)  Resp: (!) 28 (01/12/24 0100)  BP: 129/87 (01/12/24 0100)  SpO2: 97 % (01/12/24 0100)  Body mass index is 36.88 kg/m².  Weight: 119.9 kg (264 lb 5.3 oz) Vital Signs (24h Range):  Temp:  [99.4 °F (37.4 °C)-102.7 °F (39.3 °C)] 100.1 °F (37.8 °C)  Pulse:  [60-94] 94  Resp:  [11-28] 28  SpO2:  [93 %-98 %] 97 %  BP: (105-135)/(67-97) 129/87     Physical Exam  General: No acute distress. Sleeping at this timee.  HEENT: Normocephalic, atraumatic. Face symmetric.    Cardiovascular: Regular rate & rhythm  Pulmonary: Bilateral symmetric chest rise, patient mechanically ventilated via trach  Abdominal:  non-distended, soft, round, positive bowel sounds  Extremities: No clubbing or cyanosis.  1+ pitting edema distal LUE  Neuro:   trached on th vent; sedated on precedex.  Patient has non purposeful movements of RUE, does not follow commands.      Lines/Drains/Airways       Drain  Duration                  Urethral Catheter 16 Fr. -- days         Gastrostomy/Enterostomy 01/02/24 1230 LUQ 9 days         Fecal Incontinence  01/07/24 0357 4 days              Airway  Duration             Adult Surgical Airway 12/29/23 1229 13 days              Peripheral Intravenous Line  Duration                   Midline Catheter Insertion/Assessment  - Single Lumen 12/29/23 1400 Right cephalic vein (lateral side of arm) 13 days                    Significant Labs:  Lab Results   Component Value Date    WBC 12.10 (H) 01/12/2024    HGB 10.5 (L) 01/12/2024    HCT 33.3 (L) 01/12/2024    MCV 90.0 01/12/2024     01/12/2024       BMP  Lab Results   Component Value Date     01/12/2024    K 3.4 (L) 01/12/2024    CHLORIDE 102 01/12/2024    CO2 29 01/12/2024    BUN 13.3 01/12/2024    CREATININE 0.78 01/12/2024    CALCIUM 7.8 (L) 01/12/2024    AGAP 7.0 01/04/2024    EGFRNONAA 61 04/23/2022     ABG  Recent Labs   Lab 01/10/24  0925   PH 7.520*   PO2 77.0*   PCO2 44.0   HCO3 35.9*   POCBASEDEF 11.70         Mechanical Ventilation Support:  Vent Mode: SIMV (01/12/24 0101)  Set Rate: 10 BPM (01/12/24 0101)  Vt Set: 460 mL (01/12/24 0101)  Pressure Support: 10 cmH20 (01/12/24 0101)  PEEP/CPAP: 5 cmH20 (01/12/24 0101)  Oxygen Concentration (%): 30 (01/12/24 0101)  Peak Airway Pressure: 17 cmH20 (01/12/24 0101)  Total Ve: 9.1 L/m (01/12/24 0101)  F/VT Ratio<105 (RSBI): (!) 43.78 (01/11/24 1329)      Significant Imaging:  No imaging this AM      Assessment/Plan:     Assessment  CVA s/p right ICA and MCA M3  branch thrombectomy s/p craniectomy with hemorrhagic conversion  Superficial thrombosis in left cephalic vein  Confirmed by DVT U/S on 12/26/2023  Acute hypoxic respiratory failure requiring intubation  Atrial fibrillation w/ RVR-rate now controlled.  Onychomycosis  HX of CAD, HTN, HLD, ADA  Hypernatremia  Urine osmolality indicative of extrarenal process, likely 2/2 NG tube as well as lack of intake      Plan  - Free water flushes to PEG Currently at 210 ml q 4 hours. Continue TF as tolerated.   -Continue efforts to wean sedation and mechanical ventilation wean with trach as tolerated  -Bowel regimen (Miralax TID and Senna BID) stopped due to excessive number of BM   -Neurosurgery signed off on 1/3/24   -Bone flap  precautions and continue helmet use out of bed   -Seizure precautions and continue Keppra BID via Gtube   -Will require outpatient referral from placement back to outpatient neurosurgery office for consideration of cranioplasty   -Gastroenterology signed off on 1/3/24   -Continue Tfs and PEG care   -Neurology signed off on 12/22/23   -SBP goal <160    -Continue ASA 81mg qd   -Cardiology signed off on 12/25/23   -Continue Diltiazem 240mg qd, Losartan and Metoprolol tartrate 100mg BID    -Lopressor IV PRN for HR>120   -Remains on full-dose Lovenox for left cephalic vein thrombosis  -Urology signed off 1/9/24   -Continue antibiotic ointment with underwood care  -elevation/ice  -Approved for LTAC placement     DVT ppx: FD Lovenox   GI ppx: Casey Barlow MD   Pulmonary Critical Care Medicine  [facility]  DOS: 01/12/2024

## 2024-01-12 NOTE — PLAN OF CARE
Problem: Adult Inpatient Plan of Care  Goal: Plan of Care Review  Outcome: Ongoing, Not Progressing  Goal: Patient-Specific Goal (Individualized)  Outcome: Ongoing, Not Progressing  Goal: Absence of Hospital-Acquired Illness or Injury  Outcome: Ongoing, Not Progressing  Goal: Optimal Comfort and Wellbeing  Outcome: Ongoing, Not Progressing  Goal: Readiness for Transition of Care  Outcome: Ongoing, Not Progressing     Problem: Skin Injury Risk Increased  Goal: Skin Health and Integrity  Outcome: Ongoing, Not Progressing     Problem: Adjustment to Illness (Stroke, Ischemic/Transient Ischemic Attack)  Goal: Optimal Coping  Outcome: Ongoing, Not Progressing     Problem: Bowel Elimination Impaired (Stroke, Ischemic/Transient Ischemic Attack)  Goal: Effective Bowel Elimination  Outcome: Ongoing, Not Progressing     Problem: Cerebral Tissue Perfusion (Stroke, Ischemic/Transient Ischemic Attack)  Goal: Optimal Cerebral Tissue Perfusion  Outcome: Ongoing, Not Progressing     Problem: Cognitive Impairment (Stroke, Ischemic/Transient Ischemic Attack)  Goal: Optimal Cognitive Function  Outcome: Ongoing, Not Progressing     Problem: Communication Impairment (Stroke, Ischemic/Transient Ischemic Attack)  Goal: Improved Communication Skills  Outcome: Ongoing, Not Progressing     Problem: Functional Ability Impaired (Stroke, Ischemic/Transient Ischemic Attack)  Goal: Optimal Functional Ability  Outcome: Ongoing, Not Progressing     Problem: Respiratory Compromise (Stroke, Ischemic/Transient Ischemic Attack)  Goal: Effective Oxygenation and Ventilation  Outcome: Ongoing, Not Progressing     Problem: Sensorimotor Impairment (Stroke, Ischemic/Transient Ischemic Attack)  Goal: Improved Sensorimotor Function  Outcome: Ongoing, Not Progressing     Problem: Swallowing Impairment (Stroke, Ischemic/Transient Ischemic Attack)  Goal: Optimal Eating and Swallowing without Aspiration  Outcome: Ongoing, Not Progressing     Problem: Urinary  Elimination Impaired (Stroke, Ischemic/Transient Ischemic Attack)  Goal: Effective Urinary Elimination  Outcome: Ongoing, Not Progressing     Problem: Fall Injury Risk  Goal: Absence of Fall and Fall-Related Injury  Outcome: Ongoing, Not Progressing     Problem: Infection  Goal: Absence of Infection Signs and Symptoms  Outcome: Ongoing, Not Progressing     Problem: Impaired Wound Healing  Goal: Optimal Wound Healing  Outcome: Ongoing, Not Progressing

## 2024-01-12 NOTE — PLAN OF CARE
Problem: Adult Inpatient Plan of Care  Goal: Plan of Care Review  Outcome: Ongoing, Progressing  Goal: Patient-Specific Goal (Individualized)  Outcome: Ongoing, Progressing  Goal: Absence of Hospital-Acquired Illness or Injury  Outcome: Ongoing, Progressing  Goal: Optimal Comfort and Wellbeing  Outcome: Ongoing, Progressing  Goal: Readiness for Transition of Care  Outcome: Ongoing, Progressing     Problem: Skin Injury Risk Increased  Goal: Skin Health and Integrity  Outcome: Ongoing, Progressing     Problem: Adjustment to Illness (Stroke, Ischemic/Transient Ischemic Attack)  Goal: Optimal Coping  Outcome: Ongoing, Progressing     Problem: Bowel Elimination Impaired (Stroke, Ischemic/Transient Ischemic Attack)  Goal: Effective Bowel Elimination  Outcome: Ongoing, Progressing     Problem: Cerebral Tissue Perfusion (Stroke, Ischemic/Transient Ischemic Attack)  Goal: Optimal Cerebral Tissue Perfusion  Outcome: Ongoing, Progressing     Problem: Cognitive Impairment (Stroke, Ischemic/Transient Ischemic Attack)  Goal: Optimal Cognitive Function  Outcome: Ongoing, Progressing     Problem: Fall Injury Risk  Goal: Absence of Fall and Fall-Related Injury  Outcome: Ongoing, Progressing     Problem: Infection  Goal: Absence of Infection Signs and Symptoms  Outcome: Ongoing, Progressing     Problem: Impaired Wound Healing  Goal: Optimal Wound Healing  Outcome: Ongoing, Progressing     Problem: Swallowing Impairment (Stroke, Ischemic/Transient Ischemic Attack)  Goal: Optimal Eating and Swallowing without Aspiration  Outcome: Ongoing, Not Progressing

## 2024-01-12 NOTE — CARE UPDATE
[id number] Pt had fever yesterday so pt could not go to ltac.  Pt had fever thru the night and pt's white count went from 6 to 12. Dr Sellers at ltac thinks pt needs a work up to be done. Informed Dr Carvajal who disagrees with Dr Sellers since pt had this trend since he has been at the hospital.   I informed Dr Carvajal that he can call Dr Sellers re Dr Sellers's concerns. Dr Carvajal will let me know the plans for pt moving forward.  I also spoke with pt's nurse re above.   Pt was placed in will call with [facility] ambulance in case pt is able to transfer to ltac today.

## 2024-01-12 NOTE — DISCHARGE SUMMARY
"[facility]  HOSPITAL MEDICINE   DISCHARGE SUMMARY    Patient Name: Delio Daniel Jr.  MRN: [id number]  Admission Date: 12/19/2023  Hospital Length of Stay: 24 days  Discharge Date and Time: 01/12/2024  Discharge Provider: Amina Dolan  Primary Care Provider: Candy, Primary Doctor      HPI  Delio Daniel Jr. is a 68 y.o. 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 His last known normal was 9:15 per EMS. Stroke protocol in ED initiated. Unofficial CT head showed possible dense R MCA. Pt taken to cath lab for BRAD thrombectomy. Admitted to ICU for post-operative care and monitoring.     HOSPITAL COURSE  Patient underwent R ICA thrombectomy but subsequently had rapid deterioration in his neurologic status on POD#1 with finding of large R MCA infarct with marked right to left shift status post craniectomy on 12/20/2023. Patient did not have meaningful recovery of neurologic status and had tracheotomy on 12/29/2023 and PEG on 1/2/2024. Patient continues to require mechanical ventilation with vent mode weaned to SIMV with pressure support of 10 and FiO2 of 35%. Weaned off vasopressors. Remains on sedation with precedex at a rate of 0.8 mcg/kg/hr. Throughout his stay patient noted to be febrile, however this is likely neurogenic. Patient is cleared for discharge to LTAC     PHYSICAL EXAM  BP (!) 133/91   Pulse 90   Temp (!) 100.7 °F (38.2 °C) (Axillary)   Resp (!) 23   Ht 5' 10.98" (1.803 m)   Wt 119.9 kg (264 lb 5.3 oz)   SpO2 97%   BMI 36.88 kg/m²      General: No acute distress. Sleeping at this timee.  HEENT: Normocephalic, " atraumatic. Face symmetric.    Cardiovascular: Regular rate & rhythm  Pulmonary: Bilateral symmetric chest rise, patient mechanically ventilated via trach  Abdominal:  non-distended, soft, round, positive bowel sounds  Extremities: No clubbing or cyanosis.  1+ pitting edema distal LUE  Neuro:   trached on th vent; sedated on precedex.  Patient has non purposeful movements of RUE, does not follow commands        DISCHARGE DIAGNOSIS  -CVA, R MCA with hemorrhagic conversion  -Superficial thrombosis in left cephalic vein  -Acute hypoxic respiratory failure requiring intubation        _____________________________________________________________________________      DISCHARGE MEDICATION RECONCILIATION  Current Discharge Medication List        START taking these medications    Details   carvediloL (COREG) 3.125 MG tablet 1 tablet (3.125 mg total) by Per G Tube route 2 (two) times daily.  Qty: 60 tablet, Refills: 11    Comments: .      diltiaZEM (CARDIZEM) 120 MG tablet 2 tablets (240 mg total) by Per G Tube route once daily.  Qty: 60 tablet, Refills: 11      guaiFENesin 100 mg/5 ml (ROBITUSSIN) 100 mg/5 mL syrup 20 mLs (400 mg total) by Per G Tube route every 8 (eight) hours. for 10 days  Qty: 473 mL, Refills: 0      polyethylene glycol (GLYCOLAX) 17 gram PwPk 17 g by Per G Tube route daily as needed for Constipation.  Qty: 20 each, Refills: 0      QUEtiapine (SEROQUEL) 25 MG Tab 1 tablet (25 mg total) by Per G Tube route 2 (two) times daily.  Qty: 60 tablet, Refills: 11           CONTINUE these medications which have CHANGED    Details   aspirin 81 MG Chew 1 tablet (81 mg total) by Per G Tube route once daily.  Qty: 30 tablet, Refills: 11      atorvastatin (LIPITOR) 10 MG tablet 1 tablet (10 mg total) by Per G Tube route once daily.  Qty: 90 tablet, Refills: 3      losartan (COZAAR) 50 MG tablet 1 tablet (50 mg total) by Per G Tube route once daily.  Qty: 90 tablet, Refills: 3    Comments: .           STOP taking these  medications       diltiaZEM (CARDIZEM CD) 360 MG 24 hr capsule Comments:   Reason for Stopping:         metoprolol succinate (TOPROL-XL) 200 MG 24 hr tablet Comments:   Reason for Stopping:         omeprazole (PRILOSEC) 20 MG capsule Comments:   Reason for Stopping:         potassium chloride (KLOR-CON) 20 mEq Pack Comments:   Reason for Stopping:         spironolactone (ALDACTONE) 50 MG tablet Comments:   Reason for Stopping:         torsemide (DEMADEX) 10 MG Tab Comments:   Reason for Stopping:         vitamin D (VITAMIN D3) 1000 units Tab Comments:   Reason for Stopping:         XARELTO 20 mg Tab Comments:   Reason for Stopping:         albuterol (PROVENTIL/VENTOLIN HFA) 90 mcg/actuation inhaler Comments:   Reason for Stopping:         cetirizine (ZYRTEC) 10 MG tablet Comments:   Reason for Stopping:                  CONSULTS  Consults (From admission, onward)          Status Ordering Provider     Inpatient consult to Urology  Once        Provider:  Kirk Mullins MD    Completed CAITLYN ZIEGLER     Inpatient consult to Midline team  Once        Provider:  (Not yet assigned)    Acknowledged JOY MCCLURE     Inpatient consult to Respiratory Care  Once        Provider:  (Not yet assigned)    Acknowledged KODI CARBAJAL     Inpatient consult to Gastroenterology  Once        Provider:  Mark Escalona MD    Completed KODI CARBAJAL     Inpatient consult to ENT/Otolaryngology  Once        Provider:  Patricia Winslow MD    Acknowledged KODI CARBAJAL     Inpatient consult to Orthotics  Once        Provider:  Tano Perera [facility]    Acknowledged TAYO VALDERRAMA     Inpatient consult to Cardiology  Once        Provider:  Kye Parekh MD    Completed KODI CARBAJAL     Inpatient consult to Neurosurgery  Once        Provider:  Artem Can MD    Completed SCOT LEE     Inpatient consult to Social Work/Case Management  Once        Provider:  (Not yet assigned)    Completed DALILA SHERIFF  B     Inpatient consult to Vascular (Stroke) Neurology  Once        Provider:  Delonte Randle MD    Completed DALILA SHERIFF     Inpatient consult to Vascular (Stroke) Neurology  Once        Provider:  Delonte Randle MD    Completed WADE STARK              FOLLOW UP          DISCHARGE INSTRUCTIONS  Explained in detail to the patient about the discharge plan, medications, and follow-up visits. Pt understands and agrees with the treatment plan.  Discharged Condition: stable  Diet as tolerated  Activities as tolerated  Discharge to: Long Term Acute Care    TIME SPENT ON DISCHARGE  35 minutes        Amina Dolan MD  [facility] [place] [facility], -2  1/12/2024

## 2024-01-13 LAB
ALBUMIN SERPL-MCNC: 2 G/DL (ref 3.4–4.8)
ALBUMIN/GLOB SERPL: 0.5 RATIO (ref 1.1–2)
ALP SERPL-CCNC: 48 UNIT/L (ref 40–150)
ALT SERPL-CCNC: 27 UNIT/L (ref 0–55)
AST SERPL-CCNC: 27 UNIT/L (ref 5–34)
BASOPHILS # BLD AUTO: 0.02 X10(3)/MCL
BASOPHILS NFR BLD AUTO: 0.2 %
BILIRUB SERPL-MCNC: 0.5 MG/DL
BUN SERPL-MCNC: 13.9 MG/DL (ref 8.4–25.7)
CALCIUM SERPL-MCNC: 7.7 MG/DL (ref 8.8–10)
CHLORIDE SERPL-SCNC: 105 MMOL/L (ref 98–107)
CO2 SERPL-SCNC: 30 MMOL/L (ref 23–31)
CREAT SERPL-MCNC: 0.69 MG/DL (ref 0.73–1.18)
EOSINOPHIL # BLD AUTO: 0.3 X10(3)/MCL (ref 0–0.9)
EOSINOPHIL NFR BLD AUTO: 3.1 %
ERYTHROCYTE [DISTWIDTH] IN BLOOD BY AUTOMATED COUNT: 14.6 % (ref 11.5–17)
GFR SERPLBLD CREATININE-BSD FMLA CKD-EPI: >60 MLS/MIN/1.73/M2
GLOBULIN SER-MCNC: 3.9 GM/DL (ref 2.4–3.5)
GLUCOSE SERPL-MCNC: 157 MG/DL (ref 82–115)
HCT VFR BLD AUTO: 32.1 % (ref 42–52)
HGB BLD-MCNC: 10.4 G/DL (ref 14–18)
IMM GRANULOCYTES # BLD AUTO: 0.05 X10(3)/MCL (ref 0–0.04)
IMM GRANULOCYTES NFR BLD AUTO: 0.5 %
LYMPHOCYTES # BLD AUTO: 1.45 X10(3)/MCL (ref 0.6–4.6)
LYMPHOCYTES NFR BLD AUTO: 14.8 %
MAGNESIUM SERPL-MCNC: 2 MG/DL (ref 1.6–2.6)
MCH RBC QN AUTO: 29 PG (ref 27–31)
MCHC RBC AUTO-ENTMCNC: 32.4 G/DL (ref 33–36)
MCV RBC AUTO: 89.4 FL (ref 80–94)
MONOCYTES # BLD AUTO: 0.62 X10(3)/MCL (ref 0.1–1.3)
MONOCYTES NFR BLD AUTO: 6.3 %
NEUTROPHILS # BLD AUTO: 7.36 X10(3)/MCL (ref 2.1–9.2)
NEUTROPHILS NFR BLD AUTO: 75.1 %
NRBC BLD AUTO-RTO: 0 %
PLATELET # BLD AUTO: 179 X10(3)/MCL (ref 130–400)
PLATELETS.RETICULATED NFR BLD AUTO: 3.2 % (ref 0.9–11.2)
PMV BLD AUTO: 11.1 FL (ref 7.4–10.4)
POTASSIUM SERPL-SCNC: 3.6 MMOL/L (ref 3.5–5.1)
PROT SERPL-MCNC: 5.9 GM/DL (ref 5.8–7.6)
RBC # BLD AUTO: 3.59 X10(6)/MCL (ref 4.7–6.1)
SODIUM SERPL-SCNC: 141 MMOL/L (ref 136–145)
WBC # SPEC AUTO: 9.8 X10(3)/MCL (ref 4.5–11.5)

## 2024-01-13 PROCEDURE — 31720 CLEARANCE OF AIRWAYS: CPT

## 2024-01-13 PROCEDURE — 63600175 PHARM REV CODE 636 W HCPCS: Performed by: INTERNAL MEDICINE

## 2024-01-13 PROCEDURE — 25000003 PHARM REV CODE 250: Performed by: INTERNAL MEDICINE

## 2024-01-13 PROCEDURE — 99900026 HC AIRWAY MAINTENANCE (STAT)

## 2024-01-13 PROCEDURE — 63600175 PHARM REV CODE 636 W HCPCS: Performed by: STUDENT IN AN ORGANIZED HEALTH CARE EDUCATION/TRAINING PROGRAM

## 2024-01-13 PROCEDURE — 94640 AIRWAY INHALATION TREATMENT: CPT

## 2024-01-13 PROCEDURE — 25000003 PHARM REV CODE 250: Performed by: STUDENT IN AN ORGANIZED HEALTH CARE EDUCATION/TRAINING PROGRAM

## 2024-01-13 PROCEDURE — 99900035 HC TECH TIME PER 15 MIN (STAT)

## 2024-01-13 PROCEDURE — 94761 N-INVAS EAR/PLS OXIMETRY MLT: CPT

## 2024-01-13 PROCEDURE — 83735 ASSAY OF MAGNESIUM: CPT

## 2024-01-13 PROCEDURE — 80053 COMPREHEN METABOLIC PANEL: CPT

## 2024-01-13 PROCEDURE — 27100171 HC OXYGEN HIGH FLOW UP TO 24 HOURS

## 2024-01-13 PROCEDURE — 85025 COMPLETE CBC W/AUTO DIFF WBC: CPT

## 2024-01-13 PROCEDURE — 20000000 HC ICU ROOM

## 2024-01-13 PROCEDURE — 94003 VENT MGMT INPAT SUBQ DAY: CPT

## 2024-01-13 RX ORDER — SODIUM CHLORIDE 9 MG/ML
INJECTION, SOLUTION INTRAVENOUS CONTINUOUS
Status: DISCONTINUED | OUTPATIENT
Start: 2024-01-13 | End: 2024-01-16 | Stop reason: HOSPADM

## 2024-01-13 RX ADMIN — ATORVASTATIN CALCIUM 10 MG: 10 TABLET, FILM COATED ORAL at 08:01

## 2024-01-13 RX ADMIN — DILTIAZEM HYDROCHLORIDE 240 MG: 60 TABLET, FILM COATED ORAL at 08:01

## 2024-01-13 RX ADMIN — BACITRACIN ZINC, NEOMYCIN, POLYMYXIN B: 400; 3.5; 5 OINTMENT TOPICAL at 08:01

## 2024-01-13 RX ADMIN — PIPERACILLIN AND TAZOBACTAM 4.5 G: 4; .5 INJECTION, POWDER, LYOPHILIZED, FOR SOLUTION INTRAVENOUS; PARENTERAL at 05:01

## 2024-01-13 RX ADMIN — DEXMEDETOMIDINE HYDROCHLORIDE 0.8 MCG/KG/HR: 4 INJECTION INTRAVENOUS at 05:01

## 2024-01-13 RX ADMIN — DOCUSATE SODIUM 100 MG: 50 LIQUID ORAL at 08:01

## 2024-01-13 RX ADMIN — GUAIFENESIN 400 MG: 200 SOLUTION ORAL at 05:01

## 2024-01-13 RX ADMIN — CARVEDILOL 3.12 MG: 3.12 TABLET, FILM COATED ORAL at 08:01

## 2024-01-13 RX ADMIN — LEVETIRACETAM 1000 MG: 100 SOLUTION ORAL at 08:01

## 2024-01-13 RX ADMIN — SODIUM CHLORIDE: 9 INJECTION, SOLUTION INTRAVENOUS at 02:01

## 2024-01-13 RX ADMIN — ENOXAPARIN SODIUM 120 MG: 150 INJECTION SUBCUTANEOUS at 08:01

## 2024-01-13 RX ADMIN — GUAIFENESIN 400 MG: 200 SOLUTION ORAL at 02:01

## 2024-01-13 RX ADMIN — QUETIAPINE FUMARATE 25 MG: 25 TABLET ORAL at 08:01

## 2024-01-13 RX ADMIN — DEXMEDETOMIDINE HYDROCHLORIDE 0.6 MCG/KG/HR: 4 INJECTION INTRAVENOUS at 10:01

## 2024-01-13 RX ADMIN — ASPIRIN 81 MG CHEWABLE TABLET 81 MG: 81 TABLET CHEWABLE at 08:01

## 2024-01-13 RX ADMIN — PIPERACILLIN AND TAZOBACTAM 4.5 G: 4; .5 INJECTION, POWDER, LYOPHILIZED, FOR SOLUTION INTRAVENOUS; PARENTERAL at 11:01

## 2024-01-13 RX ADMIN — LOSARTAN POTASSIUM 50 MG: 50 TABLET, FILM COATED ORAL at 08:01

## 2024-01-13 RX ADMIN — FAMOTIDINE 20 MG: 10 INJECTION, SOLUTION INTRAVENOUS at 08:01

## 2024-01-13 RX ADMIN — GUAIFENESIN 400 MG: 200 SOLUTION ORAL at 09:01

## 2024-01-13 RX ADMIN — DEXMEDETOMIDINE HYDROCHLORIDE 0.6 MCG/KG/HR: 4 INJECTION INTRAVENOUS at 11:01

## 2024-01-13 NOTE — NURSING
Nurses Note -- 4 Eyes      1/13/2024   3:41 AM      Skin assessed during: Q Shift Change      [x] No Altered Skin Integrity Present    [x]Prevention Measures Documented      [] Yes- Altered Skin Integrity Present or Discovered   [] LDA Added if Not in Epic (Describe Wound)   [] New Altered Skin Integrity was Present on Admit and Documented in LDA   [] Wound Image Taken    Wound Care Consulted? No    Attending Nurse:  Demi Johnson RN/Staff Member:  Awa

## 2024-01-13 NOTE — PROGRESS NOTES
[facility] - [facility] ICU  Pulmonary Critical Care Note    Patient Name: Delio Daniel Jr.  MRN: [id number]  Admission Date: 12/19/2023  Hospital Length of Stay: 25 days  Code Status: Full Code  Attending Provider: Efrain Salcido MD  Primary Care Provider: Candy, Primary Doctor     Subjective:     HPI:   Delio Daniel Jr. is a 68 y.o. 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 His last known normal was 9:15 per EMS. Stroke protocol in ED initiated. Unofficial CT head showed possible dense R MCA. Pt taken to cath lab for BRAD thrombectomy. Admitted to ICU for post-operative care and monitoring.     Hospital Course/Significant events:  12/19/2023 - Admitted to ICU s/p right internal carotid artery thrombectomy  12/20/2023 - 12/20/2023 he had a rapid deterioration in his neurologic status with CT showing, as expected, a large right MCA distribution infarct with marked right-to-left shift. s/p craniectomy  12/29/23 - s/p tracheotomy  1/2/2024- PEG tube placed   1/7/23 - Catheter exchanged    24 Hour Interval History:  No acute events over night. Last fever at 8 pm with temp 100.7 F. Nurse reports thick brownish secretions around trach and on glans of penis. Ventilated via tracheostomy and sedated on precedex. Vent settings SIMV. Has been approved for LTAC placement but held given fevers and leukocytosis. Fever thought to be neurogenic in nature, but patient patel cultured given WBC 12.10 on 1/12/2204. Leukocytosis has since resolved.     Past Medical History:   Diagnosis Date    Arthritis     Atrial fibrillation     BPH (benign prostatic hyperplasia)      Cardiac arrest     Coronary artery disease     Cyst, kidney, acquired     Diverticulosis     Hyperlipidemia     Hypertension     MI (myocardial infarction)     Obesity     Steatosis of liver      Past Surgical History:   Procedure Laterality Date    A-V CARDIAC PACEMAKER INSERTION Right     CARDIAC CATHETERIZATION      COLONOSCOPY W/ BIOPSIES      CRANIECTOMY Right 12/20/2023    Procedure: CRANIECTOMY;  Surgeon: Artem Can MD;  Location: [facility];  Service: Neurosurgery;  Laterality: Right;    ESOPHAGOGASTRODUODENOSCOPY W/ PEG N/A 1/2/2024    Procedure: PEG;  Surgeon: Tani Day MD;  Location: [facility] ENDOSCOPY;  Service: Gastroenterology;  Laterality: N/A;    excision of colon      TRACHEOSTOMY N/A 12/29/2023    Procedure: CREATION, TRACHEOSTOMY;  Surgeon: Patricia Winslow MD;  Location: [facility] OR;  Service: ENT;  Laterality: N/A;  REQ 1130 //  NEEDS 2 SCRUBS     Social History     Socioeconomic History    Marital status:     Number of children: 9   Occupational History    Occupation: retired   Tobacco Use    Smoking status: Former    Smokeless tobacco: Never   Substance and Sexual Activity    Alcohol use: Not Currently    Drug use: Not Currently    Sexual activity: Not Currently     Partners: Female     Social Determinants of Health     Financial Resource Strain: Low Risk  (10/19/2022)    Overall Financial Resource Strain (CARDIA)     Difficulty of Paying Living Expenses: Not hard at all   Food Insecurity: No Food Insecurity (10/19/2022)    Hunger Vital Sign     Worried About Running Out of Food in the Last Year: Never true     Ran Out of Food in the Last Year: Never true   Transportation Needs: No Transportation Needs (10/19/2022)    PRAPARE - Transportation     Lack of Transportation (Medical): No     Lack of Transportation (Non-Medical): No   Physical Activity: Sufficiently Active (10/19/2022)    Exercise Vital Sign     Days of Exercise per Week: 6 days     Minutes of Exercise per Session:  60 min   Stress: No Stress Concern Present (10/19/2022)    [place] [employer] - Occupational Stress Questionnaire     Feeling of Stress : Not at all   Social Connections: Unknown (10/19/2022)    Social Connection and Isolation Panel [NHANES]     Frequency of Communication with Friends and Family: More than three times a week     Frequency of Social Gatherings with Friends and Family: More than three times a week     Attends Sikh Services: More than 4 times per year     Active Member of Clubs or Organizations: No     Attends Club or Organization Meetings: Never   Housing Stability: Low Risk  (10/19/2022)    Housing Stability Vital Sign     Unable to Pay for Housing in the Last Year: No     Number of Places Lived in the Last Year: 1     Unstable Housing in the Last Year: No     Current Outpatient Medications   Medication Instructions    aspirin 81 mg, Per G Tube, Daily    atorvastatin (LIPITOR) 10 mg, Per G Tube, Daily    carvediloL (COREG) 3.125 mg, Per G Tube, 2 times daily    diltiaZEM (CARDIZEM) 240 mg, Per G Tube, Daily    guaiFENesin 100 mg/5 ml (ROBITUSSIN) 400 mg, Per G Tube, Every 8 hours    losartan (COZAAR) 50 mg, Per G Tube, Daily    polyethylene glycol (GLYCOLAX) 17 g, Per G Tube, Daily PRN    QUEtiapine (SEROQUEL) 25 mg, Per G Tube, 2 times daily     Current Inpatient Medications   aspirin  81 mg Per G Tube Daily    atorvastatin  10 mg Per G Tube Daily    carvediloL  3.125 mg Per G Tube BID    diltiaZEM  240 mg Per G Tube Daily    docusate  100 mg Per G Tube BID    enoxparin  1 mg/kg Subcutaneous Q12H (treatment, non-standard time)    famotidine (PF)  20 mg Intravenous Daily    guaiFENesin 100 mg/5 ml  400 mg Per G Tube Q8H    levetiracetam  1,000 mg Per G Tube BID    losartan  50 mg Per G Tube Daily    mannitol 20%  75 g Intravenous Once    neomycin-bacitracin-polymyxin   Topical (Top) BID    QUEtiapine  25 mg Per G Tube BID     Current Intravenous Infusions   dexmedeTOMIDine  (Precedex) infusion (titrating) 0.8 mcg/kg/hr (01/12/24 6071)    fentanyl Stopped (12/31/23 0634)    NORepinephrine bitartrate-D5W Stopped (12/20/23 1929)       Review of Systems   Unable to perform ROS: Critical illness   All other systems reviewed and are negative.     Objective:       Intake/Output Summary (Last 24 hours) at 1/13/2024 0430  Last data filed at 1/13/2024 0400  Gross per 24 hour   Intake 4170.45 ml   Output 2260 ml   Net 1910.45 ml       Vital Signs (Most Recent):  Temp: 99.4 °F (37.4 °C) (01/13/24 0400)  Pulse: 79 (01/13/24 0400)  Resp: (!) 22 (01/13/24 0400)  BP: 131/76 (01/13/24 0400)  SpO2: 97 % (01/13/24 0400)  Body mass index is 36.88 kg/m².  Weight: 119.9 kg (264 lb 5.3 oz) Vital Signs (24h Range):  Temp:  [99 °F (37.2 °C)-100.9 °F (38.3 °C)] 99.4 °F (37.4 °C)  Pulse:  [] 79  Resp:  [1-28] 22  SpO2:  [93 %-100 %] 97 %  BP: (106-143)/(64-95) 131/76     Physical Exam  General: No acute distress. Sleeping at this timee.  HEENT: Normocephalic, atraumatic. Face symmetric.    Cardiovascular: Regular rate & rhythm  Pulmonary: Bilateral symmetric chest rise, patient mechanically ventilated via trach  Abdominal:  non-distended, soft, round, positive bowel sounds  Extremities: No clubbing or cyanosis.  1+ pitting edema distal LUE  Neuro:   trach on the vent; sedated on precedex.  Patient has non purposeful movements of RUE, does not follow commands.      Lines/Drains/Airways       Drain  Duration                  Urethral Catheter 16 Fr. -- days         Gastrostomy/Enterostomy 01/02/24 1230 LUQ 10 days         Fecal Incontinence  01/07/24 0357 6 days              Airway  Duration             Adult Surgical Airway 12/29/23 1229 14 days              Peripheral Intravenous Line  Duration                  Midline Catheter Insertion/Assessment  - Single Lumen 12/29/23 1400 Right cephalic vein (lateral side of arm) 14 days                    Significant Labs:  Lab Results   Component Value  Date    WBC 9.80 01/13/2024    HGB 10.4 (L) 01/13/2024    HCT 32.1 (L) 01/13/2024    MCV 89.4 01/13/2024     01/13/2024       BMP  Lab Results   Component Value Date     01/13/2024    K 3.6 01/13/2024    CHLORIDE 105 01/13/2024    CO2 30 01/13/2024    BUN 13.9 01/13/2024    CREATININE 0.69 (L) 01/13/2024    CALCIUM 7.7 (L) 01/13/2024    AGAP 7.0 01/04/2024    EGFRNONAA 61 04/23/2022     ABG  Recent Labs   Lab 01/10/24  0925   PH 7.520*   PO2 77.0*   PCO2 44.0   HCO3 35.9*   POCBASEDEF 11.70         Mechanical Ventilation Support:  Vent Mode: VOLUME SIMV (01/13/24 0236)  Set Rate: 10 BPM (01/13/24 0236)  Vt Set: 460 mL (01/13/24 0236)  Pressure Support: 10 cmH20 (01/13/24 0236)  PEEP/CPAP: 5 cmH20 (01/13/24 0236)  Oxygen Concentration (%): 30 (01/13/24 0400)  Peak Airway Pressure: 23 cmH20 (01/13/24 0236)  Total Ve: 12.5 L/m (01/13/24 0236)  F/VT Ratio<105 (RSBI): (!) 38.46 (01/13/24 0236)      Significant Imaging:  No results found in the last 24 hours.       Assessment/Plan:     Assessment  CVA s/p right ICA and MCA M3  branch thrombectomy s/p craniectomy with hemorrhagic conversion  Superficial thrombosis in left cephalic vein  Confirmed by DVT U/S on 12/26/2023  Acute hypoxic respiratory failure requiring intubation  Atrial fibrillation w/ RVR-rate now controlled.  Onychomycosis  HX of CAD, HTN, HLD, ADA  Hypernatremia  Urine osmolality indicative of extrarenal process, likely 2/2 NG tube as well as lack of intake  Febrile, possibly neurogenic  Leukocytosis - resolved  WBC 12.10 on 1/12/2024  Resolved the following ay  Pan culture results pending      Plan  - Free water flushes to PEG Currently at 210 ml q 4 hours. Continue TF as tolerated.   -Continue efforts to wean sedation and mechanical ventilation wean with trach as tolerated  -Bowel regimen (Miralax TID and Senna BID) stopped due to excessive number of BM   -Neurosurgery signed off on 1/3/24   -Bone flap precautions and continue helmet use out  of bed   -Seizure precautions and continue Keppra BID via Gtube   -Will require outpatient referral from placement back to outpatient neurosurgery office for consideration of cranioplasty   -Gastroenterology signed off on 1/3/24   -Continue Tfs and PEG care   -Neurology signed off on 12/22/23   -SBP goal <160    -Continue ASA 81mg qd   -Cardiology signed off on 12/25/23   -Continue Diltiazem 240mg qd, Losartan and Metoprolol tartrate 100mg BID    -Lopressor IV PRN for HR>120   -Remains on full-dose Lovenox for left cephalic vein thrombosis  -Urology signed off 1/9/24   -Continue antibiotic ointment with underwood care  -elevation/ice  -Follow up blood culture, respiratory culture: GNR, and urine culture: GNR  -Exchange Underwood catheter  -Start Zosyn  -Approved for LTAC placement, discharge pending infectious workup    DVT ppx: FD Lovenox   GI ppx: Casey Dolan MD   Pulmonary Critical Care Medicine  [facility] - [facility] ICU  DOS: 01/13/2024

## 2024-01-13 NOTE — PLAN OF CARE
Problem: Adult Inpatient Plan of Care  Goal: Plan of Care Review  Outcome: Ongoing, Progressing  Goal: Patient-Specific Goal (Individualized)  Outcome: Ongoing, Progressing  Goal: Absence of Hospital-Acquired Illness or Injury  Outcome: Ongoing, Progressing  Goal: Optimal Comfort and Wellbeing  Outcome: Ongoing, Progressing  Goal: Readiness for Transition of Care  Outcome: Ongoing, Progressing     Problem: Skin Injury Risk Increased  Goal: Skin Health and Integrity  Outcome: Ongoing, Progressing     Problem: Adjustment to Illness (Stroke, Ischemic/Transient Ischemic Attack)  Goal: Optimal Coping  Outcome: Ongoing, Progressing     Problem: Bowel Elimination Impaired (Stroke, Ischemic/Transient Ischemic Attack)  Goal: Effective Bowel Elimination  Outcome: Ongoing, Progressing     Problem: Impaired Wound Healing  Goal: Optimal Wound Healing  Outcome: Ongoing, Progressing

## 2024-01-13 NOTE — NURSING
Nurses Note -- 4 Eyes      1/13/2024   2:13 PM      Skin assessed during: Q Shift Change      [x] No Altered Skin Integrity Present    [x]Prevention Measures Documented      [] Yes- Altered Skin Integrity Present or Discovered   [] LDA Added if Not in Epic (Describe Wound)   [] New Altered Skin Integrity was Present on Admit and Documented in LDA   [] Wound Image Taken    Wound Care Consulted? No    Attending Nurse:  Carleen Johnson RN/Staff Member:  MÓNICA Hunter

## 2024-01-14 LAB
ACINETOBACTER CALCOACETICUS-BAUMANNII COMPLEX (OHS): NOT DETECTED
ALBUMIN SERPL-MCNC: 2 G/DL (ref 3.4–4.8)
ALBUMIN/GLOB SERPL: 0.5 RATIO (ref 1.1–2)
ALP SERPL-CCNC: 46 UNIT/L (ref 40–150)
ALT SERPL-CCNC: 23 UNIT/L (ref 0–55)
APPEARANCE UR: ABNORMAL
AST SERPL-CCNC: 23 UNIT/L (ref 5–34)
BACTERIA #/AREA URNS AUTO: ABNORMAL /HPF
BACTERIA SPT CULT: ABNORMAL
BACTERIA UR CULT: ABNORMAL
BACTEROIDES FRAGILIS (OHS): NOT DETECTED
BASOPHILS # BLD AUTO: 0.03 X10(3)/MCL
BASOPHILS NFR BLD AUTO: 0.4 %
BILIRUB SERPL-MCNC: 0.4 MG/DL
BILIRUB UR QL STRIP.AUTO: NEGATIVE
BUN SERPL-MCNC: 18 MG/DL (ref 8.4–25.7)
C AURIS DNA BLD POS QL NAA+NON-PROBE: NOT DETECTED
C GATTII+NEOFOR DNA CSF QL NAA+NON-PROBE: NOT DETECTED
CALCIUM SERPL-MCNC: 8 MG/DL (ref 8.8–10)
CANDIDA ALBICANS (OHS): NOT DETECTED
CANDIDA GLABRATA (OHS): NOT DETECTED
CANDIDA KRUSEI (OHS): NOT DETECTED
CANDIDA PARAPSILOSIS (OHS): NOT DETECTED
CANDIDA TROPICALIS (OHS): NOT DETECTED
CHLORIDE SERPL-SCNC: 104 MMOL/L (ref 98–107)
CO2 SERPL-SCNC: 32 MMOL/L (ref 23–31)
COLOR UR AUTO: COLORLESS
CREAT SERPL-MCNC: 0.75 MG/DL (ref 0.73–1.18)
CTX-M (OHS): ABNORMAL
ENTEROBACTER CLOACAE COMPLEX (OHS): NOT DETECTED
ENTEROBACTERALES (OHS): NOT DETECTED
ENTEROCOCCUS FAECALIS (OHS): NOT DETECTED
ENTEROCOCCUS FAECIUM (OHS): NOT DETECTED
EOSINOPHIL # BLD AUTO: 0.5 X10(3)/MCL (ref 0–0.9)
EOSINOPHIL NFR BLD AUTO: 6.5 %
ERYTHROCYTE [DISTWIDTH] IN BLOOD BY AUTOMATED COUNT: 14.6 % (ref 11.5–17)
ESCHERICHIA COLI (OHS): NOT DETECTED
GFR SERPLBLD CREATININE-BSD FMLA CKD-EPI: >60 MLS/MIN/1.73/M2
GLOBULIN SER-MCNC: 3.8 GM/DL (ref 2.4–3.5)
GLUCOSE SERPL-MCNC: 138 MG/DL (ref 82–115)
GLUCOSE UR QL STRIP.AUTO: NORMAL
GP B STREP DNA CSF QL NAA+NON-PROBE: NOT DETECTED
GRAM STN SPEC: ABNORMAL
HAEM INFLU DNA CSF QL NAA+NON-PROBE: NOT DETECTED
HCT VFR BLD AUTO: 32.2 % (ref 42–52)
HGB BLD-MCNC: 10.1 G/DL (ref 14–18)
IMM GRANULOCYTES # BLD AUTO: 0.03 X10(3)/MCL (ref 0–0.04)
IMM GRANULOCYTES NFR BLD AUTO: 0.4 %
IMP (OHS): ABNORMAL
KETONES UR QL STRIP.AUTO: NEGATIVE
KLEBSIELLA AEROGENES (OHS): NOT DETECTED
KLEBSIELLA OXYTOCA (OHS): NOT DETECTED
KLEBSIELLA PNEUMONIAE GROUP (OHS): NOT DETECTED
KPC (OHS): ABNORMAL
L MONOCYTOG DNA CSF QL NAA+NON-PROBE: NOT DETECTED
LEUKOCYTE ESTERASE UR QL STRIP.AUTO: 250
LYMPHOCYTES # BLD AUTO: 1.85 X10(3)/MCL (ref 0.6–4.6)
LYMPHOCYTES NFR BLD AUTO: 24.1 %
MAGNESIUM SERPL-MCNC: 1.9 MG/DL (ref 1.6–2.6)
MCH RBC QN AUTO: 28.6 PG (ref 27–31)
MCHC RBC AUTO-ENTMCNC: 31.4 G/DL (ref 33–36)
MCR-1 (OHS): ABNORMAL
MCV RBC AUTO: 91.2 FL (ref 80–94)
MECA/C (OHS): DETECTED
MECA/C AND MREJ (MRSA)(OHS): ABNORMAL
MONOCYTES # BLD AUTO: 0.74 X10(3)/MCL (ref 0.1–1.3)
MONOCYTES NFR BLD AUTO: 9.6 %
N MEN DNA CSF QL NAA+NON-PROBE: NOT DETECTED
NDM (OHS): ABNORMAL
NEUTROPHILS # BLD AUTO: 4.52 X10(3)/MCL (ref 2.1–9.2)
NEUTROPHILS NFR BLD AUTO: 59 %
NITRITE UR QL STRIP.AUTO: NEGATIVE
NRBC BLD AUTO-RTO: 0 %
OXA-48-LIKE (OHS): ABNORMAL
PH UR STRIP.AUTO: 6.5 [PH]
PLATELET # BLD AUTO: 198 X10(3)/MCL (ref 130–400)
PMV BLD AUTO: 11.3 FL (ref 7.4–10.4)
POTASSIUM SERPL-SCNC: 3.5 MMOL/L (ref 3.5–5.1)
PROT SERPL-MCNC: 5.8 GM/DL (ref 5.8–7.6)
PROT UR QL STRIP.AUTO: ABNORMAL
PROTEUS SPP. (OHS): NOT DETECTED
PSEUDOMONAS AERUGINOSA (OHS): NOT DETECTED
RBC # BLD AUTO: 3.53 X10(6)/MCL (ref 4.7–6.1)
RBC #/AREA URNS AUTO: >100 /HPF
RBC UR QL AUTO: ABNORMAL
S ENT+BONG DNA STL QL NAA+NON-PROBE: NOT DETECTED
S PNEUM DNA CSF QL NAA+NON-PROBE: NOT DETECTED
SERRATIA MARCESCENS (OHS): NOT DETECTED
SODIUM SERPL-SCNC: 141 MMOL/L (ref 136–145)
SP GR UR STRIP.AUTO: 1.02 (ref 1–1.03)
SQUAMOUS #/AREA URNS LPF: ABNORMAL /HPF
STAPHYLOCOCCUS AUREUS (OHS): NOT DETECTED
STAPHYLOCOCCUS EPIDERMIDIS (OHS): DETECTED
STAPHYLOCOCCUS LUGDUNENSIS (OHS): NOT DETECTED
STAPHYLOCOCCUS SPP. (OHS): DETECTED
STENOTROPHOMONAS MALTOPHILIA (OHS): NOT DETECTED
STREPTOCOCCUS PYOGENES (GROUP A)(OHS): NOT DETECTED
STREPTOCOCCUS SPP. (OHS): NOT DETECTED
UROBILINOGEN UR STRIP-ACNC: NORMAL
VANA/B (OHS): ABNORMAL
VIM (OHS): ABNORMAL
WBC # SPEC AUTO: 7.67 X10(3)/MCL (ref 4.5–11.5)
WBC #/AREA URNS AUTO: ABNORMAL /HPF

## 2024-01-14 PROCEDURE — 25000003 PHARM REV CODE 250: Performed by: INTERNAL MEDICINE

## 2024-01-14 PROCEDURE — 99900026 HC AIRWAY MAINTENANCE (STAT)

## 2024-01-14 PROCEDURE — 80053 COMPREHEN METABOLIC PANEL: CPT

## 2024-01-14 PROCEDURE — 83735 ASSAY OF MAGNESIUM: CPT

## 2024-01-14 PROCEDURE — 63600175 PHARM REV CODE 636 W HCPCS: Performed by: INTERNAL MEDICINE

## 2024-01-14 PROCEDURE — 25000003 PHARM REV CODE 250: Performed by: STUDENT IN AN ORGANIZED HEALTH CARE EDUCATION/TRAINING PROGRAM

## 2024-01-14 PROCEDURE — 94761 N-INVAS EAR/PLS OXIMETRY MLT: CPT

## 2024-01-14 PROCEDURE — 81001 URINALYSIS AUTO W/SCOPE: CPT

## 2024-01-14 PROCEDURE — 20000000 HC ICU ROOM

## 2024-01-14 PROCEDURE — 63600175 PHARM REV CODE 636 W HCPCS: Performed by: STUDENT IN AN ORGANIZED HEALTH CARE EDUCATION/TRAINING PROGRAM

## 2024-01-14 PROCEDURE — 85025 COMPLETE CBC W/AUTO DIFF WBC: CPT

## 2024-01-14 PROCEDURE — 27100171 HC OXYGEN HIGH FLOW UP TO 24 HOURS

## 2024-01-14 PROCEDURE — 87077 CULTURE AEROBIC IDENTIFY: CPT

## 2024-01-14 PROCEDURE — 99900022

## 2024-01-14 PROCEDURE — 27200966 HC CLOSED SUCTION SYSTEM

## 2024-01-14 PROCEDURE — 99900035 HC TECH TIME PER 15 MIN (STAT)

## 2024-01-14 RX ORDER — LEVOFLOXACIN 5 MG/ML
750 INJECTION, SOLUTION INTRAVENOUS
Status: DISCONTINUED | OUTPATIENT
Start: 2024-01-14 | End: 2024-01-16 | Stop reason: HOSPADM

## 2024-01-14 RX ADMIN — GUAIFENESIN 400 MG: 200 SOLUTION ORAL at 05:01

## 2024-01-14 RX ADMIN — LEVOFLOXACIN 750 MG: 750 INJECTION, SOLUTION INTRAVENOUS at 04:01

## 2024-01-14 RX ADMIN — DEXTROSE MONOHYDRATE 2000 MG: 5 INJECTION, SOLUTION INTRAVENOUS at 08:01

## 2024-01-14 RX ADMIN — DILTIAZEM HYDROCHLORIDE 240 MG: 60 TABLET, FILM COATED ORAL at 08:01

## 2024-01-14 RX ADMIN — HYDRALAZINE HYDROCHLORIDE 10 MG: 20 INJECTION INTRAMUSCULAR; INTRAVENOUS at 05:01

## 2024-01-14 RX ADMIN — PIPERACILLIN AND TAZOBACTAM 4.5 G: 4; .5 INJECTION, POWDER, LYOPHILIZED, FOR SOLUTION INTRAVENOUS; PARENTERAL at 11:01

## 2024-01-14 RX ADMIN — BACITRACIN ZINC, NEOMYCIN, POLYMYXIN B: 400; 3.5; 5 OINTMENT TOPICAL at 08:01

## 2024-01-14 RX ADMIN — ENOXAPARIN SODIUM 120 MG: 150 INJECTION SUBCUTANEOUS at 08:01

## 2024-01-14 RX ADMIN — LABETALOL HYDROCHLORIDE 10 MG: 5 INJECTION, SOLUTION INTRAVENOUS at 04:01

## 2024-01-14 RX ADMIN — DOCUSATE SODIUM 100 MG: 50 LIQUID ORAL at 08:01

## 2024-01-14 RX ADMIN — LOSARTAN POTASSIUM 50 MG: 50 TABLET, FILM COATED ORAL at 08:01

## 2024-01-14 RX ADMIN — DEXMEDETOMIDINE HYDROCHLORIDE 0.6 MCG/KG/HR: 4 INJECTION INTRAVENOUS at 11:01

## 2024-01-14 RX ADMIN — GUAIFENESIN 400 MG: 200 SOLUTION ORAL at 08:01

## 2024-01-14 RX ADMIN — CARVEDILOL 3.12 MG: 3.12 TABLET, FILM COATED ORAL at 08:01

## 2024-01-14 RX ADMIN — QUETIAPINE FUMARATE 25 MG: 25 TABLET ORAL at 08:01

## 2024-01-14 RX ADMIN — LEVETIRACETAM 1000 MG: 100 SOLUTION ORAL at 08:01

## 2024-01-14 RX ADMIN — DEXMEDETOMIDINE HYDROCHLORIDE 0.6 MCG/KG/HR: 4 INJECTION INTRAVENOUS at 07:01

## 2024-01-14 RX ADMIN — DEXTROSE MONOHYDRATE 2000 MG: 5 INJECTION, SOLUTION INTRAVENOUS at 09:01

## 2024-01-14 RX ADMIN — FAMOTIDINE 20 MG: 10 INJECTION, SOLUTION INTRAVENOUS at 08:01

## 2024-01-14 RX ADMIN — DEXMEDETOMIDINE HYDROCHLORIDE 0.6 MCG/KG/HR: 4 INJECTION INTRAVENOUS at 05:01

## 2024-01-14 RX ADMIN — ATORVASTATIN CALCIUM 10 MG: 10 TABLET, FILM COATED ORAL at 08:01

## 2024-01-14 RX ADMIN — GUAIFENESIN 400 MG: 200 SOLUTION ORAL at 03:01

## 2024-01-14 RX ADMIN — PIPERACILLIN AND TAZOBACTAM 4.5 G: 4; .5 INJECTION, POWDER, LYOPHILIZED, FOR SOLUTION INTRAVENOUS; PARENTERAL at 01:01

## 2024-01-14 RX ADMIN — ASPIRIN 81 MG CHEWABLE TABLET 81 MG: 81 TABLET CHEWABLE at 08:01

## 2024-01-14 NOTE — NURSING
Nurses Note -- 4 Eyes      1/14/2024   10:44 AM      Skin assessed during: Q Shift Change      [x] No Altered Skin Integrity Present    [x]Prevention Measures Documented      [] Yes- Altered Skin Integrity Present or Discovered   [] LDA Added if Not in Epic (Describe Wound)   [] New Altered Skin Integrity was Present on Admit and Documented in LDA   [] Wound Image Taken    Wound Care Consulted? No    Attending Nurse:  Carleen Johnson RN/Staff Member: MÓNICA Hunter

## 2024-01-14 NOTE — PLAN OF CARE
Problem: Adult Inpatient Plan of Care  Goal: Plan of Care Review  Outcome: Ongoing, Progressing  Goal: Patient-Specific Goal (Individualized)  Outcome: Ongoing, Progressing  Goal: Absence of Hospital-Acquired Illness or Injury  Outcome: Ongoing, Progressing  Goal: Optimal Comfort and Wellbeing  Outcome: Ongoing, Progressing  Goal: Readiness for Transition of Care  Outcome: Ongoing, Progressing     Problem: Skin Injury Risk Increased  Goal: Skin Health and Integrity  Outcome: Ongoing, Progressing     Problem: Fall Injury Risk  Goal: Absence of Fall and Fall-Related Injury  Outcome: Ongoing, Progressing     Problem: Infection  Goal: Absence of Infection Signs and Symptoms  Outcome: Ongoing, Progressing     Problem: Impaired Wound Healing  Goal: Optimal Wound Healing  Outcome: Ongoing, Progressing

## 2024-01-14 NOTE — PLAN OF CARE
Problem: Skin Injury Risk Increased  Goal: Skin Health and Integrity  Outcome: Ongoing, Progressing     Problem: Bowel Elimination Impaired (Stroke, Ischemic/Transient Ischemic Attack)  Goal: Effective Bowel Elimination  Outcome: Ongoing, Progressing     Problem: Cerebral Tissue Perfusion (Stroke, Ischemic/Transient Ischemic Attack)  Goal: Optimal Cerebral Tissue Perfusion  Outcome: Ongoing, Progressing     Problem: Respiratory Compromise (Stroke, Ischemic/Transient Ischemic Attack)  Goal: Effective Oxygenation and Ventilation  Outcome: Ongoing, Progressing     Problem: Urinary Elimination Impaired (Stroke, Ischemic/Transient Ischemic Attack)  Goal: Effective Urinary Elimination  Outcome: Ongoing, Progressing     Problem: Fall Injury Risk  Goal: Absence of Fall and Fall-Related Injury  Outcome: Ongoing, Progressing     Problem: Impaired Wound Healing  Goal: Optimal Wound Healing  Outcome: Ongoing, Progressing     Problem: Adult Inpatient Plan of Care  Goal: Patient-Specific Goal (Individualized)  Outcome: Ongoing, Not Progressing  Flowsheets (Taken 1/13/2024 1918)  Individualized Care Needs: unable to state     Problem: Adjustment to Illness (Stroke, Ischemic/Transient Ischemic Attack)  Goal: Optimal Coping  Outcome: Ongoing, Not Progressing     Problem: Cognitive Impairment (Stroke, Ischemic/Transient Ischemic Attack)  Goal: Optimal Cognitive Function  Outcome: Ongoing, Not Progressing     Problem: Communication Impairment (Stroke, Ischemic/Transient Ischemic Attack)  Goal: Improved Communication Skills  Outcome: Ongoing, Not Progressing     Problem: Functional Ability Impaired (Stroke, Ischemic/Transient Ischemic Attack)  Goal: Optimal Functional Ability  Outcome: Ongoing, Not Progressing     Problem: Sensorimotor Impairment (Stroke, Ischemic/Transient Ischemic Attack)  Goal: Improved Sensorimotor Function  Outcome: Ongoing, Not Progressing     Problem: Swallowing Impairment (Stroke, Ischemic/Transient Ischemic  Attack)  Goal: Optimal Eating and Swallowing without Aspiration  Outcome: Ongoing, Not Progressing     Problem: Infection  Goal: Absence of Infection Signs and Symptoms  Outcome: Ongoing, Not Progressing

## 2024-01-14 NOTE — PROGRESS NOTES
Pharmacokinetic Initial Assessment: IV Vancomycin    Assessment/Plan:    Initiate intravenous vancomycin with loading dose of 2000 mg once followed by a maintenance dose of vancomycin 2000mg IV every 12 hours  Desired empiric serum trough concentration is 15 to 20 mcg/mL  Draw vancomycin trough level 60 min prior to fourth dose on 1/15/24 at approximately 2100  Pharmacy will continue to follow and monitor vancomycin.      Please contact pharmacy with any questions regarding this assessment.     Thank you for the consult,   Bety D Rob       Patient brief summary:  Delio Daniel Jr. is a 68 y.o. male initiated on antimicrobial therapy with IV Vancomycin for treatment of suspected bacteremia    Drug Allergies:   Review of patient's allergies indicates:  No Known Allergies    Actual Body Weight:   119.9 kg    Renal Function:   Estimated Creatinine Clearance: 125.9 mL/min (based on SCr of 0.75 mg/dL).,     Dialysis Method (if applicable):  N/A    CBC (last 72 hours):  Recent Labs   Lab Result Units 01/12/24  0115 01/13/24  0205 01/14/24  0203   WBC x10(3)/mcL 12.10* 9.80 7.67   Hgb g/dL 10.5* 10.4* 10.1*   Hct % 33.3* 32.1* 32.2*   Platelet x10(3)/mcL 236 179 198   Mono % % 6.2 6.3 9.6   Eos % % 1.8 3.1 6.5   Basophil % % 0.2 0.2 0.4       Metabolic Panel (last 72 hours):  Recent Labs   Lab Result Units 01/12/24  0115 01/12/24  1736 01/13/24  0205 01/14/24  0203 01/14/24  0531   Sodium Level mmol/L 139  --  141 141  --    Potassium Level mmol/L 3.4*  --  3.6 3.5  --    Chloride mmol/L 102  --  105 104  --    Carbon Dioxide mmol/L 29  --  30 32*  --    Glucose Level mg/dL 144*  --  157* 138*  --    Glucose, UA   --  Normal  --   --  Normal   Blood Urea Nitrogen mg/dL 13.3  --  13.9 18.0  --    Creatinine mg/dL 0.78  --  0.69* 0.75  --    Albumin Level g/dL 2.2*  --  2.0* 2.0*  --    Bilirubin Total mg/dL 0.5  --  0.5 0.4  --    Alkaline Phosphatase unit/L 50  --  48 46  --    Aspartate Aminotransferase unit/L 27  --   "27 23  --    Alanine Aminotransferase unit/L 28  --  27 23  --    Magnesium Level mg/dL 2.00  --  2.00 1.90  --        Drug levels (last 3 results):  No results for input(s): "VANCOMYCINRA", "VANCORANDOM", "VANCOMYCINPE", "VANCOPEAK", "VANCOMYCINTR", "VANCOTROUGH" in the last 72 hours.    Microbiologic Results:  Microbiology Results (last 7 days)       Procedure Component Value Units Date/Time    Blood Culture [[id number]]     Order Status: Sent Specimen: Blood     Blood Culture [[id number]]     Order Status: Sent Specimen: Blood     BCID2 Panel [[id number]] Collected: 01/12/24 1756    Order Status: Sent Specimen: Blood Updated: 01/14/24 0744    Blood Culture [[id number]]  (Abnormal) Collected: 01/12/24 1756    Order Status: Completed Specimen: Blood Updated: 01/14/24 0744     GRAM STAIN Gram Positive Cocci, probable Staphylococcus      Seen in gram stain of broth only      1 of 2 Anaerobic bottles positive    Respiratory Culture [[id number]]  (Abnormal)  (Susceptibility) Collected: 01/12/24 1736    Order Status: Completed Specimen: Lung Aspirate from Endotracheal Aspirate Updated: 01/14/24 0704     Respiratory Culture Moderate Raoultella ornithinolytica     Comment: with normal respiratory niyah        GRAM STAIN Quality 2+      Moderate Gram Positive Rods      Few Gram Negative Rods      Few Gram positive cocci    Blood Culture [[id number]]  (Normal) Collected: 01/12/24 1756    Order Status: Completed Specimen: Blood Updated: 01/13/24 2000     CULTURE, BLOOD ([facility]) No Growth At 24 Hours    Urine culture [[id number]]  (Abnormal) Collected: 01/12/24 1736    Order Status: Completed Specimen: Urine, Catheterized Updated: 01/13/24 0733     Urine Culture >/= 100,000 colonies/ml Gram-negative Rods            "

## 2024-01-14 NOTE — PROGRESS NOTES
[facility] - [facility]  Pulmonary Critical Care Note    Patient Name: Delio Daniel Jr.  MRN: [id number]  Admission Date: 12/19/2023  Hospital Length of Stay: 26 days  Code Status: Full Code  Attending Provider: Efrain Salcido MD  Primary Care Provider: Candy, Primary Doctor     Subjective:     HPI:   Delio Daniel Jr. is a 68 y.o. 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 His last known normal was 9:15 per EMS. Stroke protocol in ED initiated. Unofficial CT head showed possible dense R MCA. Pt taken to cath lab for BRAD thrombectomy. Admitted to ICU for post-operative care and monitoring.     Hospital Course/Significant events:  12/19/2023 - Admitted to ICU s/p right internal carotid artery thrombectomy  12/20/2023 - 12/20/2023 he had a rapid deterioration in his neurologic status with CT showing, as expected, a large right MCA distribution infarct with marked right-to-left shift. s/p craniectomy  12/29/23 - s/p tracheotomy  1/2/2024- PEG tube placed   1/7/23 - Catheter exchanged    24 Hour Interval History:  Patient with no acute events overnight. Patient remains afebrile at this time. Currently awaiting further speciation of patient's urine culture. Respiratory cultures growing Raoultella ornithinolytica. Blood cultures grew 1 of 2 bottles of probable Staph. Patient on Zosyn day 2. Will give dose of Vancomycin at this time and repeat blood cultures to rule out contaminant. Currently pending LTAC placement at this time pending resolution of fevers.     Past Medical History:   Diagnosis Date    Arthritis     Atrial fibrillation     BPH  (benign prostatic hyperplasia)     Cardiac arrest     Coronary artery disease     Cyst, kidney, acquired     Diverticulosis     Hyperlipidemia     Hypertension     MI (myocardial infarction)     Obesity     Steatosis of liver      Past Surgical History:   Procedure Laterality Date    A-V CARDIAC PACEMAKER INSERTION Right     CARDIAC CATHETERIZATION      COLONOSCOPY W/ BIOPSIES      CRANIECTOMY Right 12/20/2023    Procedure: CRANIECTOMY;  Surgeon: Artem Can MD;  Location: [facility] OR;  Service: Neurosurgery;  Laterality: Right;    ESOPHAGOGASTRODUODENOSCOPY W/ PEG N/A 1/2/2024    Procedure: PEG;  Surgeon: Tani Day MD;  Location: [facility] ENDOSCOPY;  Service: Gastroenterology;  Laterality: N/A;    excision of colon      TRACHEOSTOMY N/A 12/29/2023    Procedure: CREATION, TRACHEOSTOMY;  Surgeon: Patricia Winslow MD;  Location: [facility] OR;  Service: ENT;  Laterality: N/A;  REQ 1130 //  NEEDS 2 SCRUBS     Social History     Socioeconomic History    Marital status:     Number of children: 9   Occupational History    Occupation: retired   Tobacco Use    Smoking status: Former    Smokeless tobacco: Never   Substance and Sexual Activity    Alcohol use: Not Currently    Drug use: Not Currently    Sexual activity: Not Currently     Partners: Female     Social Determinants of Health     Financial Resource Strain: Low Risk  (10/19/2022)    Overall Financial Resource Strain (CARDIA)     Difficulty of Paying Living Expenses: Not hard at all   Food Insecurity: No Food Insecurity (10/19/2022)    Hunger Vital Sign     Worried About Running Out of Food in the Last Year: Never true     Ran Out of Food in the Last Year: Never true   Transportation Needs: No Transportation Needs (10/19/2022)    PRAPARE - Transportation     Lack of Transportation (Medical): No     Lack of Transportation (Non-Medical): No   Physical Activity: Sufficiently Active (10/19/2022)    Exercise Vital Sign     Days of Exercise per Week: 6 days      Minutes of Exercise per Session: 60 min   Stress: No Stress Concern Present (10/19/2022)    [employer] [place] [employer] - Occupational Stress Questionnaire     Feeling of Stress : Not at all   Social Connections: Unknown (10/19/2022)    Social Connection and Isolation Panel [NHANES]     Frequency of Communication with Friends and Family: More than three times a week     Frequency of Social Gatherings with Friends and Family: More than three times a week     Attends Confucianist Services: More than 4 times per year     Active Member of Clubs or Organizations: No     Attends Club or Organization Meetings: Never   Housing Stability: Low Risk  (10/19/2022)    Housing Stability Vital Sign     Unable to Pay for Housing in the Last Year: No     Number of Places Lived in the Last Year: 1     Unstable Housing in the Last Year: No     Current Outpatient Medications   Medication Instructions    aspirin 81 mg, Per G Tube, Daily    atorvastatin (LIPITOR) 10 mg, Per G Tube, Daily    carvediloL (COREG) 3.125 mg, Per G Tube, 2 times daily    diltiaZEM (CARDIZEM) 240 mg, Per G Tube, Daily    guaiFENesin 100 mg/5 ml (ROBITUSSIN) 400 mg, Per G Tube, Every 8 hours    losartan (COZAAR) 50 mg, Per G Tube, Daily    polyethylene glycol (GLYCOLAX) 17 g, Per G Tube, Daily PRN    QUEtiapine (SEROQUEL) 25 mg, Per G Tube, 2 times daily     Current Inpatient Medications   aspirin  81 mg Per G Tube Daily    atorvastatin  10 mg Per G Tube Daily    carvediloL  3.125 mg Per G Tube BID    diltiaZEM  240 mg Per G Tube Daily    docusate  100 mg Per G Tube BID    enoxparin  1 mg/kg Subcutaneous Q12H (treatment, non-standard time)    famotidine (PF)  20 mg Intravenous Daily    guaiFENesin 100 mg/5 ml  400 mg Per G Tube Q8H    levetiracetam  1,000 mg Per G Tube BID    losartan  50 mg Per G Tube Daily    mannitol 20%  75 g Intravenous Once    neomycin-bacitracin-polymyxin   Topical (Top) BID    piperacillin-tazobactam (Zosyn) IV (PEDS and ADULTS)  (extended infusion is not appropriate)  4.5 g Intravenous Q8H    QUEtiapine  25 mg Per G Tube BID     Current Intravenous Infusions   sodium chloride 0.9% 10 mL/hr at 01/13/24 2319    dexmedeTOMIDine (Precedex) infusion (titrating) 0.6 mcg/kg/hr (01/13/24 2323)    fentanyl Stopped (12/31/23 0634)    NORepinephrine bitartrate-D5W Stopped (12/20/23 1929)       Review of Systems   Unable to perform ROS: Critical illness   All other systems reviewed and are negative.     Objective:       Intake/Output Summary (Last 24 hours) at 1/14/2024 0518  Last data filed at 1/14/2024 0407  Gross per 24 hour   Intake 3921.17 ml   Output 2625 ml   Net 1296.17 ml       Vital Signs (Most Recent):  Temp: 99 °F (37.2 °C) (01/14/24 0310)  Pulse: 86 (01/14/24 0504)  Resp: (!) 24 (01/14/24 0504)  BP: (!) 150/100 (01/14/24 0504)  SpO2: 97 % (01/14/24 0504)  Body mass index is 36.88 kg/m².  Weight: 119.9 kg (264 lb 5.3 oz) Vital Signs (24h Range):  Temp:  [99 °F (37.2 °C)-100.5 °F (38.1 °C)] 99 °F (37.2 °C)  Pulse:  [] 86  Resp:  [13-30] 24  SpO2:  [96 %-100 %] 97 %  BP: (105-156)/() 150/100     Physical Exam  General: No acute distress. Sleeping at this timee.  HEENT: Normocephalic, atraumatic. Face symmetric.    Cardiovascular: Regular rate & rhythm  Pulmonary: Bilateral symmetric chest rise, patient mechanically ventilated via trach  Abdominal:  non-distended, soft, round, positive bowel sounds  Extremities: No clubbing or cyanosis.  1+ pitting edema distal LUE  Neuro:   trach on the vent; sedated on precedex.  Patient has non purposeful movements of RUE, does not follow commands.      Lines/Drains/Airways       Drain  Duration                  Urethral Catheter 16 Fr. -- days         Gastrostomy/Enterostomy 01/02/24 1230 LUQ 11 days         Fecal Incontinence  01/07/24 0357 7 days              Airway  Duration             Adult Surgical Airway 12/29/23 1229 15 days              Peripheral Intravenous Line  Duration                   Midline Catheter Insertion/Assessment  - Single Lumen 12/29/23 1400 Right cephalic vein (lateral side of arm) 15 days                    Significant Labs:  Lab Results   Component Value Date    WBC 7.67 01/14/2024    HGB 10.1 (L) 01/14/2024    HCT 32.2 (L) 01/14/2024    MCV 91.2 01/14/2024     01/14/2024       BMP  Lab Results   Component Value Date     01/14/2024    K 3.5 01/14/2024    CHLORIDE 104 01/14/2024    CO2 32 (H) 01/14/2024    BUN 18.0 01/14/2024    CREATININE 0.75 01/14/2024    CALCIUM 8.0 (L) 01/14/2024    AGAP 7.0 01/04/2024    EGFRNONAA 61 04/23/2022     ABG  Recent Labs   Lab 01/10/24  0925   PH 7.520*   PO2 77.0*   PCO2 44.0   HCO3 35.9*   POCBASEDEF 11.70         Mechanical Ventilation Support:  Vent Mode: VOLUME SIMV (01/14/24 0504)  Set Rate: 10 BPM (01/14/24 0504)  Vt Set: 460 mL (01/14/24 0504)  Pressure Support: 10 cmH20 (01/14/24 0504)  PEEP/CPAP: 5 cmH20 (01/14/24 0504)  Oxygen Concentration (%): 30 (01/14/24 0504)  Peak Airway Pressure: 21 cmH20 (01/14/24 0504)  Total Ve: 13.7 L/m (01/14/24 0504)  F/VT Ratio<105 (RSBI): (!) 48.39 (01/14/24 0504)      Significant Imaging:  No results found in the last 24 hours.       Assessment/Plan:     Assessment  CVA s/p right ICA and MCA M3  branch thrombectomy s/p craniectomy with hemorrhagic conversion  Superficial thrombosis in left cephalic vein  Confirmed by DVT U/S on 12/26/2023  Acute hypoxic respiratory failure requiring intubation  Atrial fibrillation w/ RVR-rate now controlled.  Onychomycosis  HX of CAD, HTN, HLD, ADA  Hypernatremia  Urine osmolality indicative of extrarenal process, likely 2/2 NG tube as well as lack of intake  Febrile, possibly neurogenic  Leukocytosis - resolved  WBC 12.10 on 1/12/2024  Resolved the following ay  Pan culture results pending      Plan  - Free water flushes to PEG Currently at 210 ml q 4 hours. Continue TF as tolerated.   -Continue efforts to wean sedation and mechanical  ventilation wean with trach as tolerated  -Bowel regimen (Miralax TID and Senna BID) stopped due to excessive number of BM   -Neurosurgery signed off on 1/3/24   -Bone flap precautions and continue helmet use out of bed   -Seizure precautions and continue Keppra BID via Gtube   -Will require outpatient referral from placement back to outpatient neurosurgery office for consideration of cranioplasty   -Gastroenterology signed off on 1/3/24   -Continue Tfs and PEG care   -Neurology signed off on 12/22/23   -SBP goal <160    -Continue ASA 81mg qd   -Cardiology signed off on 12/25/23   -Continue Diltiazem 240mg qd, Losartan and Metoprolol tartrate 100mg BID    -Lopressor IV PRN for HR>120   -Remains on full-dose Lovenox for left cephalic vein thrombosis  -Urology signed off 1/9/24   -Continue antibiotic ointment with underwood care  -elevation/ice  -Blood culture 1 of 2 bottles probable Staph, respiratory culture: Raoultella ornithinolytica, and urine culture: GNR  -Continue Zosyn (day 2/7); de-escalate pending culture sensitivities  -Approved for LTAC placement, discharge pending infectious workup    DVT ppx: FD Lovenox   GI ppx: Pepcid      Sony Corbin MD   Pulmonary Critical Care Medicine  [facility] - [facility]  DOS: 01/14/2024

## 2024-01-15 LAB
ALBUMIN SERPL-MCNC: 2 G/DL (ref 3.4–4.8)
ALBUMIN/GLOB SERPL: 0.5 RATIO (ref 1.1–2)
ALP SERPL-CCNC: 49 UNIT/L (ref 40–150)
ALT SERPL-CCNC: 23 UNIT/L (ref 0–55)
AST SERPL-CCNC: 25 UNIT/L (ref 5–34)
BASOPHILS # BLD AUTO: 0.06 X10(3)/MCL
BASOPHILS NFR BLD AUTO: 1.1 %
BILIRUB SERPL-MCNC: 0.4 MG/DL
BUN SERPL-MCNC: 14 MG/DL (ref 8.4–25.7)
CALCIUM SERPL-MCNC: 7.9 MG/DL (ref 8.8–10)
CHLORIDE SERPL-SCNC: 104 MMOL/L (ref 98–107)
CO2 SERPL-SCNC: 28 MMOL/L (ref 23–31)
CREAT SERPL-MCNC: 0.69 MG/DL (ref 0.73–1.18)
EOSINOPHIL # BLD AUTO: 0.48 X10(3)/MCL (ref 0–0.9)
EOSINOPHIL NFR BLD AUTO: 8.8 %
ERYTHROCYTE [DISTWIDTH] IN BLOOD BY AUTOMATED COUNT: 14.5 % (ref 11.5–17)
GFR SERPLBLD CREATININE-BSD FMLA CKD-EPI: >60 MLS/MIN/1.73/M2
GLOBULIN SER-MCNC: 3.8 GM/DL (ref 2.4–3.5)
GLUCOSE SERPL-MCNC: 148 MG/DL (ref 82–115)
HCT VFR BLD AUTO: 31.2 % (ref 42–52)
HGB BLD-MCNC: 10 G/DL (ref 14–18)
IMM GRANULOCYTES # BLD AUTO: 0.03 X10(3)/MCL (ref 0–0.04)
IMM GRANULOCYTES NFR BLD AUTO: 0.6 %
LYMPHOCYTES # BLD AUTO: 1.37 X10(3)/MCL (ref 0.6–4.6)
LYMPHOCYTES NFR BLD AUTO: 25.2 %
MCH RBC QN AUTO: 28.5 PG (ref 27–31)
MCHC RBC AUTO-ENTMCNC: 32.1 G/DL (ref 33–36)
MCV RBC AUTO: 88.9 FL (ref 80–94)
MONOCYTES # BLD AUTO: 0.49 X10(3)/MCL (ref 0.1–1.3)
MONOCYTES NFR BLD AUTO: 9 %
NEUTROPHILS # BLD AUTO: 3.01 X10(3)/MCL (ref 2.1–9.2)
NEUTROPHILS NFR BLD AUTO: 55.3 %
NRBC BLD AUTO-RTO: 0 %
PLATELET # BLD AUTO: 197 X10(3)/MCL (ref 130–400)
PMV BLD AUTO: 10.5 FL (ref 7.4–10.4)
POTASSIUM SERPL-SCNC: 3.4 MMOL/L (ref 3.5–5.1)
PROT SERPL-MCNC: 5.8 GM/DL (ref 5.8–7.6)
RBC # BLD AUTO: 3.51 X10(6)/MCL (ref 4.7–6.1)
SODIUM SERPL-SCNC: 142 MMOL/L (ref 136–145)
VANCOMYCIN TROUGH SERPL-MCNC: 21.2 UG/ML (ref 15–20)
WBC # SPEC AUTO: 5.44 X10(3)/MCL (ref 4.5–11.5)

## 2024-01-15 PROCEDURE — C1751 CATH, INF, PER/CENT/MIDLINE: HCPCS

## 2024-01-15 PROCEDURE — 25000003 PHARM REV CODE 250: Performed by: INTERNAL MEDICINE

## 2024-01-15 PROCEDURE — 99900035 HC TECH TIME PER 15 MIN (STAT)

## 2024-01-15 PROCEDURE — 80053 COMPREHEN METABOLIC PANEL: CPT

## 2024-01-15 PROCEDURE — 20000000 HC ICU ROOM

## 2024-01-15 PROCEDURE — 36569 INSJ PICC 5 YR+ W/O IMAGING: CPT

## 2024-01-15 PROCEDURE — 27100171 HC OXYGEN HIGH FLOW UP TO 24 HOURS

## 2024-01-15 PROCEDURE — 94003 VENT MGMT INPAT SUBQ DAY: CPT

## 2024-01-15 PROCEDURE — 85025 COMPLETE CBC W/AUTO DIFF WBC: CPT

## 2024-01-15 PROCEDURE — 25000003 PHARM REV CODE 250: Performed by: STUDENT IN AN ORGANIZED HEALTH CARE EDUCATION/TRAINING PROGRAM

## 2024-01-15 PROCEDURE — 94761 N-INVAS EAR/PLS OXIMETRY MLT: CPT

## 2024-01-15 PROCEDURE — 99900022

## 2024-01-15 PROCEDURE — 80202 ASSAY OF VANCOMYCIN: CPT | Performed by: INTERNAL MEDICINE

## 2024-01-15 PROCEDURE — 63600175 PHARM REV CODE 636 W HCPCS: Performed by: INTERNAL MEDICINE

## 2024-01-15 PROCEDURE — 99900026 HC AIRWAY MAINTENANCE (STAT)

## 2024-01-15 RX ORDER — POTASSIUM CHLORIDE 20 MEQ/1
40 TABLET, EXTENDED RELEASE ORAL ONCE
Status: DISCONTINUED | OUTPATIENT
Start: 2024-01-15 | End: 2024-01-15

## 2024-01-15 RX ADMIN — LOSARTAN POTASSIUM 50 MG: 50 TABLET, FILM COATED ORAL at 08:01

## 2024-01-15 RX ADMIN — ENOXAPARIN SODIUM 120 MG: 150 INJECTION SUBCUTANEOUS at 08:01

## 2024-01-15 RX ADMIN — ASPIRIN 81 MG CHEWABLE TABLET 81 MG: 81 TABLET CHEWABLE at 08:01

## 2024-01-15 RX ADMIN — DOCUSATE SODIUM 100 MG: 50 LIQUID ORAL at 08:01

## 2024-01-15 RX ADMIN — LEVOFLOXACIN 750 MG: 750 INJECTION, SOLUTION INTRAVENOUS at 03:01

## 2024-01-15 RX ADMIN — QUETIAPINE FUMARATE 25 MG: 25 TABLET ORAL at 08:01

## 2024-01-15 RX ADMIN — POTASSIUM BICARBONATE 20 MEQ: 391 TABLET, EFFERVESCENT ORAL at 03:01

## 2024-01-15 RX ADMIN — BACITRACIN ZINC, NEOMYCIN, POLYMYXIN B: 400; 3.5; 5 OINTMENT TOPICAL at 08:01

## 2024-01-15 RX ADMIN — DEXTROSE MONOHYDRATE 2000 MG: 5 INJECTION, SOLUTION INTRAVENOUS at 08:01

## 2024-01-15 RX ADMIN — GUAIFENESIN 400 MG: 200 SOLUTION ORAL at 05:01

## 2024-01-15 RX ADMIN — GUAIFENESIN 400 MG: 200 SOLUTION ORAL at 03:01

## 2024-01-15 RX ADMIN — LEVETIRACETAM 1000 MG: 100 SOLUTION ORAL at 08:01

## 2024-01-15 RX ADMIN — DILTIAZEM HYDROCHLORIDE 240 MG: 60 TABLET, FILM COATED ORAL at 08:01

## 2024-01-15 RX ADMIN — DEXMEDETOMIDINE HYDROCHLORIDE 0.8 MCG/KG/HR: 4 INJECTION INTRAVENOUS at 10:01

## 2024-01-15 RX ADMIN — CARVEDILOL 3.12 MG: 3.12 TABLET, FILM COATED ORAL at 08:01

## 2024-01-15 RX ADMIN — DEXMEDETOMIDINE HYDROCHLORIDE 0.8 MCG/KG/HR: 4 INJECTION INTRAVENOUS at 04:01

## 2024-01-15 RX ADMIN — DEXMEDETOMIDINE HYDROCHLORIDE 0.8 MCG/KG/HR: 4 INJECTION INTRAVENOUS at 08:01

## 2024-01-15 RX ADMIN — GUAIFENESIN 400 MG: 200 SOLUTION ORAL at 08:01

## 2024-01-15 RX ADMIN — DEXMEDETOMIDINE HYDROCHLORIDE 0.8 MCG/KG/HR: 4 INJECTION INTRAVENOUS at 12:01

## 2024-01-15 RX ADMIN — ATORVASTATIN CALCIUM 10 MG: 10 TABLET, FILM COATED ORAL at 08:01

## 2024-01-15 RX ADMIN — DEXMEDETOMIDINE HYDROCHLORIDE 0.8 MCG/KG/HR: 4 INJECTION INTRAVENOUS at 03:01

## 2024-01-15 RX ADMIN — FAMOTIDINE 20 MG: 10 INJECTION, SOLUTION INTRAVENOUS at 08:01

## 2024-01-15 NOTE — NURSING
Nurses Note -- 4 Eyes      1/15/2024   2:45 AM      Skin assessed during: Q Shift Change      [x] No Altered Skin Integrity Present    [x]Prevention Measures Documented      [] Yes- Altered Skin Integrity Present or Discovered   [] LDA Added if Not in Epic (Describe Wound)   [] New Altered Skin Integrity was Present on Admit and Documented in LDA   [] Wound Image Taken    Wound Care Consulted? No    Attending Nurse:  Demi Johnson RN/Staff Member:  Salome

## 2024-01-15 NOTE — PROCEDURES
"Delio Daniel Jr. is a 68 y.o. male patient.    Temp: 98.6 °F (37 °C) (01/15/24 0700)  Pulse: 71 (01/15/24 1340)  Resp: 16 (01/15/24 1340)  BP: 114/76 (01/15/24 1000)  SpO2: 98 % (01/15/24 1340)  Weight: 119.9 kg (264 lb 5.3 oz) (12/29/23 0541)  Height: 5' 10.98" (180.3 cm) (12/21/23 1404)    PICC  Date/Time: 1/15/2024 2:24 PM  Performed by: Christiano Marti RN  Consent Done: Yes  Time out: Immediately prior to procedure a time out was called to verify the correct patient, procedure, equipment, support staff and site/side marked as required  Indications: med administration  Local anesthetic: lidocaine 1% without epinephrine  Anesthetic Total (mL): 5  Preparation: skin prepped with ChloraPrep  Skin prep agent dried: skin prep agent completely dried prior to procedure  Sterile barriers: all five maximum sterile barriers used - cap, mask, sterile gown, sterile gloves, and large sterile sheet  Hand hygiene: hand hygiene performed prior to central venous catheter insertion  Location details: right basilic  Catheter type: triple lumen  Catheter size: 5 Fr  Catheter Length: 43cm    noVessel Caliber: large and patentNeedle advanced into vessel with real time Ultrasound guidance.  Number of attempts: 2  Post-procedure: blood return through all ports and sterile dressing applied  Technical procedures used: OVerwire exchange of midline done sterile fashion/ tried on left and could not thread line/ statrte an IV to continue precedex and exchanged midline for a triple lumen picc            Christiano Marti RN  1/15/2024    "

## 2024-01-15 NOTE — NURSING
Nurses Note -- 4 Eyes      1/15/2024   10:37 AM      Skin assessed during: Daily Assessment      [x] No Altered Skin Integrity Present    [x]Prevention Measures Documented      [] Yes- Altered Skin Integrity Present or Discovered   [] LDA Added if Not in Epic (Describe Wound)   [] New Altered Skin Integrity was Present on Admit and Documented in LDA   [] Wound Image Taken    Wound Care Consulted? No    Attending Nurse: Diane SALES         Second RN/Staff Member:  Kaelyn SHAW

## 2024-01-15 NOTE — PROGRESS NOTES
[facility] - [facility] ICU  Pulmonary Critical Care Note    Patient Name: Delio Daniel Jr.  MRN: [id number]  Admission Date: 12/19/2023  Hospital Length of Stay: 27 days  Code Status: Full Code  Attending Provider: Efrain Salcido MD  Primary Care Provider: Candy, Primary Doctor     Subjective:     HPI:   Delio Daniel Jr. is a 68 y.o. 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 His last known normal was 9:15 per EMS. Stroke protocol in ED initiated. Unofficial CT head showed possible dense R MCA. Pt taken to cath lab for BRAD thrombectomy. Admitted to ICU for post-operative care and monitoring.     Hospital Course/Significant events:  12/19/2023 - Admitted to ICU s/p right internal carotid artery thrombectomy  12/20/2023 - 12/20/2023 he had a rapid deterioration in his neurologic status with CT showing, as expected, a large right MCA distribution infarct with marked right-to-left shift. s/p craniectomy  12/29/23 - s/p tracheotomy  1/2/2024- PEG tube placed   1/7/23 - Catheter exchanged    24 Hour Interval History:  No acute events noted overnight. Patient's antibiotics changed to Vancomycin and Levofloxacin at this time. Urine cultures growing Enterobacter cloacae, respiratory cultures with Raultella ornithinolytica, and blood cultures with Staph epi. No fevers were noted overnight. Will continue infectious treatment at this time.    Past Medical History:   Diagnosis Date    Arthritis     Atrial fibrillation     BPH (benign prostatic hyperplasia)     Cardiac arrest     Coronary artery disease     Cyst, kidney, acquired     Diverticulosis      Hyperlipidemia     Hypertension     MI (myocardial infarction)     Obesity     Steatosis of liver      Past Surgical History:   Procedure Laterality Date    A-V CARDIAC PACEMAKER INSERTION Right     CARDIAC CATHETERIZATION      COLONOSCOPY W/ BIOPSIES      CRANIECTOMY Right 12/20/2023    Procedure: CRANIECTOMY;  Surgeon: Artem Can MD;  Location: [facility] OR;  Service: Neurosurgery;  Laterality: Right;    ESOPHAGOGASTRODUODENOSCOPY W/ PEG N/A 1/2/2024    Procedure: PEG;  Surgeon: Tani Day MD;  Location: [facility] ENDOSCOPY;  Service: Gastroenterology;  Laterality: N/A;    excision of colon      TRACHEOSTOMY N/A 12/29/2023    Procedure: CREATION, TRACHEOSTOMY;  Surgeon: Patricia Winslow MD;  Location: [facility] OR;  Service: ENT;  Laterality: N/A;  REQ 1130 //  NEEDS 2 SCRUBS     Social History     Socioeconomic History    Marital status:     Number of children: 9   Occupational History    Occupation: retired   Tobacco Use    Smoking status: Former    Smokeless tobacco: Never   Substance and Sexual Activity    Alcohol use: Not Currently    Drug use: Not Currently    Sexual activity: Not Currently     Partners: Female     Social Determinants of Health     Financial Resource Strain: Low Risk  (10/19/2022)    Overall Financial Resource Strain (CARDIA)     Difficulty of Paying Living Expenses: Not hard at all   Food Insecurity: No Food Insecurity (10/19/2022)    Hunger Vital Sign     Worried About Running Out of Food in the Last Year: Never true     Ran Out of Food in the Last Year: Never true   Transportation Needs: No Transportation Needs (10/19/2022)    PRAPARE - Transportation     Lack of Transportation (Medical): No     Lack of Transportation (Non-Medical): No   Physical Activity: Sufficiently Active (10/19/2022)    Exercise Vital Sign     Days of Exercise per Week: 6 days     Minutes of Exercise per Session: 60 min   Stress: No Stress Concern Present (10/19/2022)    [employer] [employer] [place] [employer]  [employer] - Occupational Stress Questionnaire     Feeling of Stress : Not at all   Social Connections: Unknown (10/19/2022)    Social Connection and Isolation Panel [NHANES]     Frequency of Communication with Friends and Family: More than three times a week     Frequency of Social Gatherings with Friends and Family: More than three times a week     Attends Buddhism Services: More than 4 times per year     Active Member of Clubs or Organizations: No     Attends Club or Organization Meetings: Never   Housing Stability: Low Risk  (10/19/2022)    Housing Stability Vital Sign     Unable to Pay for Housing in the Last Year: No     Number of Places Lived in the Last Year: 1     Unstable Housing in the Last Year: No     Current Outpatient Medications   Medication Instructions    aspirin 81 mg, Per G Tube, Daily    atorvastatin (LIPITOR) 10 mg, Per G Tube, Daily    carvediloL (COREG) 3.125 mg, Per G Tube, 2 times daily    diltiaZEM (CARDIZEM) 240 mg, Per G Tube, Daily    guaiFENesin 100 mg/5 ml (ROBITUSSIN) 400 mg, Per G Tube, Every 8 hours    losartan (COZAAR) 50 mg, Per G Tube, Daily    polyethylene glycol (GLYCOLAX) 17 g, Per G Tube, Daily PRN    QUEtiapine (SEROQUEL) 25 mg, Per G Tube, 2 times daily     Current Inpatient Medications   aspirin  81 mg Per G Tube Daily    atorvastatin  10 mg Per G Tube Daily    carvediloL  3.125 mg Per G Tube BID    diltiaZEM  240 mg Per G Tube Daily    docusate  100 mg Per G Tube BID    enoxparin  1 mg/kg Subcutaneous Q12H (treatment, non-standard time)    famotidine (PF)  20 mg Intravenous Daily    guaiFENesin 100 mg/5 ml  400 mg Per G Tube Q8H    levetiracetam  1,000 mg Per G Tube BID    levoFLOXacin  750 mg Intravenous Q24H    losartan  50 mg Per G Tube Daily    mannitol 20%  75 g Intravenous Once    neomycin-bacitracin-polymyxin   Topical (Top) BID    QUEtiapine  25 mg Per G Tube BID    vancomycin (VANCOCIN) IV (PEDS and ADULTS)  2,000 mg Intravenous Q12H     Current Intravenous  Infusions   sodium chloride 0.9% 10 mL/hr at 01/14/24 1817    dexmedeTOMIDine (Precedex) infusion (titrating) 0.8 mcg/kg/hr (01/15/24 0409)    fentanyl Stopped (12/31/23 0634)    NORepinephrine bitartrate-D5W Stopped (12/20/23 1929)       Review of Systems   Unable to perform ROS: Critical illness   All other systems reviewed and are negative.     Objective:       Intake/Output Summary (Last 24 hours) at 1/15/2024 0442  Last data filed at 1/15/2024 0400  Gross per 24 hour   Intake 2950.46 ml   Output 4050 ml   Net -1099.54 ml       Vital Signs (Most Recent):  Temp: 99 °F (37.2 °C) (01/15/24 0400)  Pulse: 90 (01/15/24 0400)  Resp: 16 (01/15/24 0400)  BP: (!) 140/97 (01/15/24 0400)  SpO2: 97 % (01/15/24 0400)  Body mass index is 36.88 kg/m².  Weight: 119.9 kg (264 lb 5.3 oz) Vital Signs (24h Range):  Temp:  [98.7 °F (37.1 °C)-99.6 °F (37.6 °C)] 99 °F (37.2 °C)  Pulse:  [] 90  Resp:  [16-26] 16  SpO2:  [95 %-100 %] 97 %  BP: ()/() 140/97     Physical Exam  General: No acute distress. Sleeping at this timee.  HEENT: Normocephalic, atraumatic. Face symmetric.    Cardiovascular: Regular rate & rhythm  Pulmonary: Bilateral symmetric chest rise, patient mechanically ventilated via trach  Abdominal:  non-distended, soft, round, positive bowel sounds  Extremities: No clubbing or cyanosis.  1+ pitting edema distal LUE  Neuro:   trach on the vent; sedated on precedex.  Patient has non purposeful movements of RUE, does not follow commands.      Lines/Drains/Airways       Drain  Duration                  Gastrostomy/Enterostomy 01/02/24 1230 LUQ 12 days         Fecal Incontinence  01/07/24 0357 8 days         Urethral Catheter 01/14/24 0521 Silicone <1 day              Airway  Duration             Adult Surgical Airway 12/29/23 1229 16 days              Peripheral Intravenous Line  Duration                  Midline Catheter Insertion/Assessment  - Single Lumen 12/29/23 1400 Right cephalic vein (lateral  side of arm) 16 days                    Significant Labs:  Lab Results   Component Value Date    WBC 7.67 01/14/2024    HGB 10.1 (L) 01/14/2024    HCT 32.2 (L) 01/14/2024    MCV 91.2 01/14/2024     01/14/2024       BMP  Lab Results   Component Value Date     01/14/2024    K 3.5 01/14/2024    CHLORIDE 104 01/14/2024    CO2 32 (H) 01/14/2024    BUN 18.0 01/14/2024    CREATININE 0.75 01/14/2024    CALCIUM 8.0 (L) 01/14/2024    AGAP 7.0 01/04/2024    EGFRNONAA 61 04/23/2022     ABG  Recent Labs   Lab 01/10/24  0925   PH 7.520*   PO2 77.0*   PCO2 44.0   HCO3 35.9*   POCBASEDEF 11.70         Mechanical Ventilation Support:  Vent Mode: VOLUME SIMV (01/15/24 0250)  Set Rate: 10 BPM (01/15/24 0250)  Vt Set: 460 mL (01/15/24 0250)  Pressure Support: 10 cmH20 (01/15/24 0250)  PEEP/CPAP: 5 cmH20 (01/15/24 0250)  Oxygen Concentration (%): 30 (01/15/24 0400)  Peak Airway Pressure: 30 cmH20 (01/15/24 0250)  Total Ve: 12 L/m (01/15/24 0250)  F/VT Ratio<105 (RSBI): (!) 24 (01/15/24 0250)      Significant Imaging:  No results found in the last 24 hours.       Assessment/Plan:     Assessment  CVA s/p right ICA and MCA M3  branch thrombectomy s/p craniectomy with hemorrhagic conversion  Superficial thrombosis in left cephalic vein  Confirmed by DVT U/S on 12/26/2023  Acute hypoxic respiratory failure requiring intubation  Atrial fibrillation w/ RVR-rate now controlled.  Onychomycosis  HX of CAD, HTN, HLD, ADA  Hypernatremia  Urine osmolality indicative of extrarenal process, likely 2/2 NG tube as well as lack of intake  Febrile, possibly neurogenic  Leukocytosis - resolved  WBC 12.10 on 1/12/2024  Resolved the following ay  Pan culture results pending      Plan  - Free water flushes to PEG Currently at 210 ml q 4 hours. Continue TF as tolerated.   -Continue efforts to wean sedation and mechanical ventilation wean with trach as tolerated  -Bowel regimen (Miralax TID and Senna BID) stopped due to excessive number of BM    -Neurosurgery signed off on 1/3/24   -Bone flap precautions and continue helmet use out of bed   -Seizure precautions and continue Keppra BID via Gtube   -Will require outpatient referral from placement back to outpatient neurosurgery office for consideration of cranioplasty   -Gastroenterology signed off on 1/3/24   -Continue Tfs and PEG care   -Neurology signed off on 12/22/23   -SBP goal <160    -Continue ASA 81mg qd   -Cardiology signed off on 12/25/23   -Continue Diltiazem 240mg qd, Losartan and Metoprolol tartrate 100mg BID    -Lopressor IV PRN for HR>120   -Remains on full-dose Lovenox for left cephalic vein thrombosis  -Urology signed off 1/9/24   -Continue antibiotic ointment with underwood care  -elevation/ice  -Blood culture 1 of 2 bottles probable Staph epi, respiratory culture: Raoultella ornithinolytica, and urine culture Enterobacter cloacae  -Continue Levofloxacin and Vancomycin (Day 2)  -Approved for LTAC placement, discharge pending infectious workup    DVT ppx: FD Lovenox   GI ppx: Casey Corbin MD   Pulmonary Critical Care Medicine  [facility] - [facility] ICU  DOS: 01/15/2024

## 2024-01-16 VITALS
RESPIRATION RATE: 16 BRPM | SYSTOLIC BLOOD PRESSURE: 158 MMHG | DIASTOLIC BLOOD PRESSURE: 109 MMHG | OXYGEN SATURATION: 97 % | BODY MASS INDEX: 37 KG/M2 | HEIGHT: 71 IN | HEART RATE: 88 BPM | WEIGHT: 264.31 LBS | TEMPERATURE: 99 F

## 2024-01-16 LAB
AV INDEX (PROSTH): 0.45
AV MEAN GRADIENT: 5 MMHG
AV PEAK GRADIENT: 9 MMHG
AV VALVE AREA BY VELOCITY RATIO: 1.46 CM²
AV VALVE AREA: 1.42 CM²
AV VELOCITY RATIO: 0.47
BACTERIA BLD CULT: ABNORMAL
BSA FOR ECHO PROCEDURE: 2.35 M2
CV ECHO LV RWT: 0.47 CM
DOP CALC AO PEAK VEL: 1.48 M/S
DOP CALC AO VTI: 30.7 CM
DOP CALC LVOT AREA: 3.1 CM2
DOP CALC LVOT DIAMETER: 2 CM
DOP CALC LVOT PEAK VEL: 0.69 M/S
DOP CALC LVOT STROKE VOLUME: 43.65 CM3
DOP CALC MV VTI: 35.9 CM
DOP CALCLVOT PEAK VEL VTI: 13.9 CM
E/E' RATIO: 11.7 M/S
ECHO LV POSTERIOR WALL: 1.2 CM (ref 0.6–1.1)
EJECTION FRACTION: 55 %
FRACTIONAL SHORTENING: 37 % (ref 28–44)
GRAM STN SPEC: ABNORMAL
INTERVENTRICULAR SEPTUM: 1.5 CM (ref 0.6–1.1)
LEFT ATRIUM SIZE: 5.6 CM
LEFT ATRIUM VOLUME INDEX MOD: 51.9 ML/M2
LEFT ATRIUM VOLUME MOD: 123 CM3
LEFT INTERNAL DIMENSION IN SYSTOLE: 3.2 CM (ref 2.1–4)
LEFT VENTRICLE DIASTOLIC VOLUME INDEX: 52.32 ML/M2
LEFT VENTRICLE DIASTOLIC VOLUME: 124 ML
LEFT VENTRICLE MASS INDEX: 120 G/M2
LEFT VENTRICLE SYSTOLIC VOLUME INDEX: 17.3 ML/M2
LEFT VENTRICLE SYSTOLIC VOLUME: 41 ML
LEFT VENTRICULAR INTERNAL DIMENSION IN DIASTOLE: 5.1 CM (ref 3.5–6)
LEFT VENTRICULAR MASS: 285.06 G
LV LATERAL E/E' RATIO: 10.64 M/S
LV SEPTAL E/E' RATIO: 13 M/S
LVOT MG: 1 MMHG
LVOT MV: 0.43 CM/S
MV MEAN GRADIENT: 2 MMHG
MV PEAK E VEL: 1.17 M/S
MV PEAK GRADIENT: 10 MMHG
MV STENOSIS PRESSURE HALF TIME: 65 MS
MV VALVE AREA BY CONTINUITY EQUATION: 1.22 CM2
MV VALVE AREA P 1/2 METHOD: 3.38 CM2
OHS LV EJECTION FRACTION SIMPSONS BIPLANE MOD: 57 %
PISA MRMAX VEL: 4.2 M/S
PISA TR MAX VEL: 1.81 M/S
PV PEAK GRADIENT: 2 MMHG
PV PEAK VELOCITY: 0.79 M/S
RA PRESSURE ESTIMATED: 3 MMHG
RV TB RVSP: 5 MMHG
TDI LATERAL: 0.11 M/S
TDI SEPTAL: 0.09 M/S
TDI: 0.1 M/S
TR MAX PG: 13 MMHG
TV REST PULMONARY ARTERY PRESSURE: 16 MMHG
Z-SCORE OF LEFT VENTRICULAR DIMENSION IN END DIASTOLE: -6.98
Z-SCORE OF LEFT VENTRICULAR DIMENSION IN END SYSTOLE: -5.16

## 2024-01-16 PROCEDURE — 94003 VENT MGMT INPAT SUBQ DAY: CPT

## 2024-01-16 PROCEDURE — 63600175 PHARM REV CODE 636 W HCPCS: Performed by: INTERNAL MEDICINE

## 2024-01-16 PROCEDURE — 25000003 PHARM REV CODE 250: Performed by: INTERNAL MEDICINE

## 2024-01-16 PROCEDURE — 99900026 HC AIRWAY MAINTENANCE (STAT)

## 2024-01-16 PROCEDURE — 25000003 PHARM REV CODE 250: Performed by: STUDENT IN AN ORGANIZED HEALTH CARE EDUCATION/TRAINING PROGRAM

## 2024-01-16 PROCEDURE — 94761 N-INVAS EAR/PLS OXIMETRY MLT: CPT

## 2024-01-16 PROCEDURE — 99900035 HC TECH TIME PER 15 MIN (STAT)

## 2024-01-16 PROCEDURE — 27100171 HC OXYGEN HIGH FLOW UP TO 24 HOURS

## 2024-01-16 PROCEDURE — 27200966 HC CLOSED SUCTION SYSTEM

## 2024-01-16 RX ADMIN — ASPIRIN 81 MG CHEWABLE TABLET 81 MG: 81 TABLET CHEWABLE at 08:01

## 2024-01-16 RX ADMIN — DEXMEDETOMIDINE HYDROCHLORIDE 0.8 MCG/KG/HR: 4 INJECTION INTRAVENOUS at 06:01

## 2024-01-16 RX ADMIN — DEXMEDETOMIDINE HYDROCHLORIDE 0.8 MCG/KG/HR: 4 INJECTION INTRAVENOUS at 12:01

## 2024-01-16 RX ADMIN — LEVOFLOXACIN 750 MG: 750 INJECTION, SOLUTION INTRAVENOUS at 04:01

## 2024-01-16 RX ADMIN — ENOXAPARIN SODIUM 120 MG: 150 INJECTION SUBCUTANEOUS at 08:01

## 2024-01-16 RX ADMIN — DILTIAZEM HYDROCHLORIDE 240 MG: 60 TABLET, FILM COATED ORAL at 08:01

## 2024-01-16 RX ADMIN — LEVETIRACETAM 1000 MG: 100 SOLUTION ORAL at 08:01

## 2024-01-16 RX ADMIN — LOSARTAN POTASSIUM 50 MG: 50 TABLET, FILM COATED ORAL at 08:01

## 2024-01-16 RX ADMIN — DEXMEDETOMIDINE HYDROCHLORIDE 0.8 MCG/KG/HR: 4 INJECTION INTRAVENOUS at 11:01

## 2024-01-16 RX ADMIN — CARVEDILOL 3.12 MG: 3.12 TABLET, FILM COATED ORAL at 08:01

## 2024-01-16 RX ADMIN — ATORVASTATIN CALCIUM 10 MG: 10 TABLET, FILM COATED ORAL at 08:01

## 2024-01-16 RX ADMIN — QUETIAPINE FUMARATE 25 MG: 25 TABLET ORAL at 08:01

## 2024-01-16 RX ADMIN — FAMOTIDINE 20 MG: 10 INJECTION, SOLUTION INTRAVENOUS at 08:01

## 2024-01-16 RX ADMIN — GUAIFENESIN 400 MG: 200 SOLUTION ORAL at 02:01

## 2024-01-16 RX ADMIN — BACITRACIN ZINC, NEOMYCIN, POLYMYXIN B: 400; 3.5; 5 OINTMENT TOPICAL at 08:01

## 2024-01-16 RX ADMIN — GUAIFENESIN 400 MG: 200 SOLUTION ORAL at 05:01

## 2024-01-16 RX ADMIN — DOCUSATE SODIUM 100 MG: 50 LIQUID ORAL at 08:01

## 2024-01-16 NOTE — NURSING
Nurses Note -- 4 Eyes      1/16/2024   4:22 AM      Skin assessed during: Q Shift Change      [x] No Altered Skin Integrity Present    [x]Prevention Measures Documented      [] Yes- Altered Skin Integrity Present or Discovered   [] LDA Added if Not in Epic (Describe Wound)   [] New Altered Skin Integrity was Present on Admit and Documented in LDA   [] Wound Image Taken    Wound Care Consulted? No    Attending Nurse:  Demi Johnson RN/Staff Member:  Isaac

## 2024-01-16 NOTE — PROGRESS NOTES
Inpatient Nutrition Assessment    Admit Date: 12/19/2023   Total duration of encounter: 28 days   Patient Age: 68 y.o.    Nutrition Recommendation/Prescription     Tube feeding recommendation:    Impact Peptide 1.5 goal rate 50 ml/hr + 2 packets ProSource TF20 daily to provide  1660 kcal/d (108% est needs)  134 g protein/d (85% est needs)  770 ml free water/d (28% est needs)  (calculations based on estimated 20 hr/d run time)     With free water flushes of 210 q4hr, total water: 2030kcal (75% est needs)    Communication of Recommendations: reviewed with nurse    Nutrition Assessment     Malnutrition Assessment/Nutrition-Focused Physical Exam    Malnutrition Context: acute illness or injury (12/21/23 1406)  Malnutrition Level:  (does not meet criteria) (12/21/23 1406)  Energy Intake (Malnutrition):  (unable to eval) (12/21/23 1406)  Weight Loss (Malnutrition):  (unable to eval) (12/21/23 1406)  Subcutaneous Fat (Malnutrition):  (does not meet criteria) (12/21/23 1406)           Muscle Mass (Malnutrition):  (does not meet criteria) (12/21/23 1406)                          Fluid Accumulation (Malnutrition):  (does not meet criteria) (12/21/23 1406)        A minimum of two characteristics is recommended for diagnosis of either severe or non-severe malnutrition.    Chart Review    Reason Seen: continuous nutrition monitoring and follow-up    Malnutrition Screening Tool Results   Have you recently lost weight without trying?: No  Have you been eating poorly because of a decreased appetite?: No   MST Score: 0   Diagnosis:  CVA s/p right ICA and MCA M3  branch thrombectomy s/p craniectomy with hemorrhagic conversion  Acute hypoxic respiratory failure  Atrial fibrillation    Relevant Medical History: Afib, HTN, CAD, CAD (STEMI 2003), HFpEF     Scheduled Medications:  aspirin, 81 mg, Daily  atorvastatin, 10 mg, Daily  carvediloL, 3.125 mg, BID  diltiaZEM, 240 mg, Daily  docusate, 100 mg, BID  enoxparin, 1 mg/kg, Q12H  (treatment, non-standard time)  famotidine (PF), 20 mg, Daily  guaiFENesin 100 mg/5 ml, 400 mg, Q8H  levetiracetam, 1,000 mg, BID  levoFLOXacin, 750 mg, Q24H  losartan, 50 mg, Daily  mannitol 20%, 75 g, Once  neomycin-bacitracin-polymyxin, , BID  potassium bicarbonate, 20 mEq, Once  QUEtiapine, 25 mg, BID  vancomycin (VANCOCIN) IV (PEDS and ADULTS), 1,750 mg, Q12H    Continuous Infusions:  sodium chloride 0.9%, Last Rate: 10 mL/hr at 01/16/24 0606  dexmedeTOMIDine (Precedex) infusion (titrating), Last Rate: 0.8 mcg/kg/hr (01/16/24 1128)  fentanyl, Last Rate: Stopped (12/31/23 0634)  NORepinephrine bitartrate-D5W, Last Rate: Stopped (12/20/23 1929)    PRN Medications: 0.9%  NaCl infusion (for blood administration), acetaminophen, dextrose 10%, dextrose 10%, fentaNYL, glucagon (human recombinant), hydrALAZINE, insulin aspart U-100, ipratropium, labetalol, levalbuterol, methyl salicylate, metoprolol, ondansetron, polyethylene glycol, sodium chloride 0.9%, Pharmacy to dose Vancomycin consult **AND** vancomycin - pharmacy to dose    Calorie Containing IV Medications: no significant kcals from medications at this time    Recent Labs   Lab 01/11/24  0129 01/12/24  0115 01/13/24  0205 01/14/24  0203 01/15/24  0459    139 141 141 142   K 3.5 3.4* 3.6 3.5 3.4*   CALCIUM 7.6* 7.8* 7.7* 8.0* 7.9*   MG 2.00 2.00 2.00 1.90  --    CHLORIDE 104 102 105 104 104   CO2 32* 29 30 32* 28   BUN 13.8 13.3 13.9 18.0 14.0   CREATININE 0.69* 0.78 0.69* 0.75 0.69*   EGFRNORACEVR >60 >60 >60 >60 >60   GLUCOSE 123* 144* 157* 138* 148*   BILITOT 0.5 0.5 0.5 0.4 0.4   ALKPHOS 48 50 48 46 49   ALT 27 28 27 23 23   AST 23 27 27 23 25   ALBUMIN 2.1* 2.2* 2.0* 2.0* 2.0*   WBC 6.85 12.10* 9.80 7.67 5.44   HGB 10.1* 10.5* 10.4* 10.1* 10.0*   HCT 32.6* 33.3* 32.1* 32.2* 31.2*        Nutrition Orders:  Diet NPO    Appetite/Oral Intake: NPO/not applicable  Factors Affecting Nutritional Intake: on mechanical ventilation and  "tracheostomy  Food/Alevism/Cultural Preferences: unable to obtain  Food Allergies: none reported  Last Bowel Movement: 01/15/24  Wound(s):  incision noted    Comments    12/21/23: Discussed with RN. Will provide tube feeding recommendations for when appropriate to start tube feeding. Receiving kcal from meds.      12/22/23: Patient remains intubated, receiving kcal from meds. Cleviprex d/c'ed this morning, Diprivan continues. Patient currently with OG tube to LIS.     12/23/23: Pt with significant output via NG tube. Propofol infusion increased and Precedex started. Will leave TPN recommendations if Tube feeds are unable to be initiated by tomorrow.     12/26/23: TF continues, tolerated per RN. Noted no longer receiving kcal from meds. Will update goal rate to continue to meet est needs.     12/29/23: TF on hold for trach placement today. Pt recently returned, NG to be placed per RN. Once pt weaned off vent, will update TF and est needs.     1/2/24: TF on hold for PEG placement today. Plans to restart TF. Will update FWF to more closely meet est fluid needs.     1/5/24: TF continues @ goal rate. No kcal from meds.     1/9/24: TF on hold. Pt continues to cough and TF coming up. Discussed possibly needing reglan with RN? Will also change to different formula in order to not have such a high goal rate.     1/12/24: RN reports patient tolerating tube feeding at goal rate. Patient receiving Pivot 1.5 as substitute for Impact Peptide 1.5 due to product supply shortage. No kcals from meds. Pending possible transfer to LTAC.    1/16/24: TF continues, tolerated per RN. No kcal from meds. Noted pending plans for LTAC.    Anthropometrics    Height: 5' 10.98" (180.3 cm),    Last Weight: 119.9 kg (264 lb 5.3 oz) (12/29/23 0541), Weight Method: Bed Scale  BMI (Calculated): 36.9  BMI Classification: obese grade I (BMI 30-34.9)        Ideal Body Weight (IBW), Male: 171.88 lb     % Ideal Body Weight, Male (lb): 140.99 %            "               Usual Weight Provided By: unable to obtain usual weight    Wt Readings from Last 5 Encounters:   12/29/23 119.9 kg (264 lb 5.3 oz)   12/11/23 112.7 kg (248 lb 7.3 oz)   12/05/23 112.3 kg (247 lb 9.6 oz)   11/07/23 109.5 kg (241 lb 6.5 oz)   10/30/23 113.9 kg (251 lb 1.7 oz)     Weight Change(s) Since Admission:   Wt Readings from Last 1 Encounters:   12/29/23 0541 119.9 kg (264 lb 5.3 oz)   12/28/23 0600 116.9 kg (257 lb 11.5 oz)   12/27/23 0600 116.9 kg (257 lb 11.5 oz)   12/26/23 0600 116.1 kg (255 lb 15.3 oz)   12/24/23 0544 111.1 kg (244 lb 14.9 oz)   12/19/23 1645 110 kg (242 lb 8.1 oz)   12/19/23 1053 110 kg (242 lb 8.1 oz)   Admit Weight: 110 kg (242 lb 8.1 oz) (12/19/23 1053), Weight Method: Bed Scale    Estimated Needs    Weight Used For Calorie Calculations: 110 kg (242 lb 8.1 oz)  Energy Calorie Requirements (kcal): 1210-1540kcal (11-14kcal/kg)  Energy Need Method: Kcal/kg  Weight Used For Protein Calculations: 78.2 kg (172 lb 6.4 oz) (IBW)  Protein Requirements: 157gm (2g/kg IBW)  Fluid Requirements (mL): 2750mL (25mL/kg CBW) or per MD    Enteral Nutrition     Formula:  Pivot 1.5 (substitute for Impact Peptide 1.5)  Rate/Volume: 50ml/hr  Water Flushes: 210ml q4hr  Additives/Modulars: Prosource TF20 x2 daily  Route: PEG tube  Method: continuous  Total Nutrition Provided by Tube Feeding, Additives, and Flushes:  Calories Provided  1660 kcal/d, 108% needs   Protein Provided  134 g/d, 85% needs   Fluid Provided  2010 ml/d, 74% needs   Continuous feeding calculations based on estimated 20 hr/d run time unless otherwise stated.    Parenteral Nutrition     Patient not receiving parenteral nutrition support at this time.    Evaluation of Received Nutrient Intake    Calories: meeting estimated needs  Protein: meeting estimated needs    Patient Education     Not applicable.    Nutrition Diagnosis     PES: Inadequate oral intake related to acute illness as evidenced by intubation/trach since 12/19/23.  (active)     Nutrition Interventions     Intervention(s): collaboration with other providers    Goal: Meet greater than 80% of nutritional needs by follow-up. (goal met)  Goal: Tolerate enteral feeding at goal rate by follow-up. (goal met)    Nutrition Goals & Monitoring     Dietitian will monitor: energy intake    Nutrition Risk/Follow-Up: high (follow-up in 1-4 days)   Please consult if re-assessment needed sooner.

## 2024-01-16 NOTE — PLAN OF CARE
Per Ally with [facility], patient can be admitted today. After reviewing secure chat, patient is on a vent and will be transported via [facility].  awaiting information for nurse to give report at this time.

## 2024-01-16 NOTE — PROGRESS NOTES
Pharmacokinetic Assessment Follow Up: IV Vancomycin    Vancomycin serum concentration assessment(s):    The trough level was drawn correctly and can be used to guide therapy at this time. The measurement is above the desired definitive target range of 15 to 20 mcg/mL.    Vancomycin Regimen Plan:    Change to 1750 mg q12h.  Check trough on 1/18/24.    Drug levels (last 3 results):  Recent Labs   Lab Result Units 01/15/24  1959   Vancomycin Trough ug/ml 21.2*          Patient brief summary:  Delio Daniel Jr. is a 68 y.o. male initiated on antimicrobial therapy with IV Vancomycin for treatment of bacteremia    Actual Body Weight:   119.9 kg    Renal Function:   Estimated Creatinine Clearance: 136.8 mL/min (A) (based on SCr of 0.69 mg/dL (L)).,     Dialysis Method (if applicable):  N/A    CBC (last 72 hours):  Recent Labs   Lab Result Units 01/13/24  0205 01/14/24  0203 01/15/24  0459   WBC x10(3)/mcL 9.80 7.67 5.44   Hgb g/dL 10.4* 10.1* 10.0*   Hct % 32.1* 32.2* 31.2*   Platelet x10(3)/mcL 179 198 197   Mono % % 6.3 9.6 9.0   Eos % % 3.1 6.5 8.8   Basophil % % 0.2 0.4 1.1       Metabolic Panel (last 72 hours):  Recent Labs   Lab Result Units 01/13/24  0205 01/14/24  0203 01/14/24  0531 01/15/24  0459   Sodium Level mmol/L 141 141  --  142   Potassium Level mmol/L 3.6 3.5  --  3.4*   Chloride mmol/L 105 104  --  104   Carbon Dioxide mmol/L 30 32*  --  28   Glucose Level mg/dL 157* 138*  --  148*   Glucose, UA   --   --  Normal  --    Blood Urea Nitrogen mg/dL 13.9 18.0  --  14.0   Creatinine mg/dL 0.69* 0.75  --  0.69*   Albumin Level g/dL 2.0* 2.0*  --  2.0*   Bilirubin Total mg/dL 0.5 0.4  --  0.4   Alkaline Phosphatase unit/L 48 46  --  49   Aspartate Aminotransferase unit/L 27 23  --  25   Alanine Aminotransferase unit/L 27 23  --  23   Magnesium Level mg/dL 2.00 1.90  --   --        Vancomycin Administrations:  vancomycin given in the last 96 hours                     vancomycin 2 g in dextrose 5 % 500 mL IVPB  (mg) 2,000 mg New Bag 01/15/24 0850     2,000 mg New Bag 01/14/24 2033     2,000 mg New Bag  0920                    Microbiologic Results:  Microbiology Results (last 7 days)       Procedure Component Value Units Date/Time    Blood Culture [[id number]]  (Normal) Collected: 01/12/24 1756    Order Status: Completed Specimen: Blood Updated: 01/15/24 2000     CULTURE, BLOOD ([facility]) No Growth At 72 Hours    Blood Culture [[id number]]  (Abnormal) Collected: 01/12/24 1756    Order Status: Completed Specimen: Blood Updated: 01/15/24 1105     CULTURE, BLOOD ([facility]) Susceptibility To Follow      Staphylococcus epidermidis     GRAM STAIN Gram Positive Cocci, probable Staphylococcus      Seen in gram stain of broth only      1 of 2 Anaerobic bottles positive    Blood Culture [[id number]]  (Abnormal) Collected: 01/14/24 1209    Order Status: Completed Specimen: Blood Updated: 01/15/24 1002     GRAM STAIN Gram Positive Cocci, probable Staphylococcus      Seen in gram stain of broth only      1 of 1 Pediatric bottle positive    Blood Culture [[id number]]  (Normal) Collected: 01/14/24 0917    Order Status: Completed Specimen: Blood Updated: 01/15/24 1001     CULTURE, BLOOD ([facility]) No Growth At 24 Hours    Urine culture [[id number]]  (Abnormal)  (Susceptibility) Collected: 01/12/24 1736    Order Status: Completed Specimen: Urine, Catheterized Updated: 01/14/24 1044     Urine Culture >/= 100,000 colonies/ml Enterobacter cloacae complex    BCID2 Panel [[id number]]  (Abnormal) Collected: 01/12/24 1756    Order Status: Completed Specimen: Blood Updated: 01/14/24 0921     CTX-M (ESBL ) N/A     IMP (Cabapenemase ) N/A     KPC resistance gene (Carbapenemase ) N/A     mcr-1 N/A     mecA ID Detected     Comment: Note: Antimicrobial resistance can occur via multiple mechanisms. A Not Detected result for antimicrobial resistance gene(s) does not indicate antimicrobial susceptibility. Subculturing is required for species  identification and susceptibility testing of   isolates.        mecA/C and MREJ (MRSA) gene N/A     NDM (Carbapenemase ) N/A     OXA-48-like (Carbapenemase ) N/A     Sofi/B (VRE gene) N/A     VIM (Carbapenemase ) N/A     Enterococcus faecalis Not Detected     Enterococcus faecium Not Detected     Listeria monocytogenes Not Detected     Staphylococcus spp. Detected     Staphylococcus aureus Not Detected     Staphylococcus epidermidis Detected     Staphylococcus lugdunensis Not Detected     Streptococcus spp. Not Detected     Streptococcus agalactiae (Group B) Not Detected     Streptococcus pneumoniae Not Detected     Streptococcus pyogenes (Group A) Not Detected     Acinetobacter calcoaceticus/baumannii complex Not Detected     Bacteroides fragilis Not Detected     Enterobacterales Not Detected     Enterobacter cloacae complex Not Detected     Escherichia coli Not Detected     Klebsiella aerogenes Not Detected     Klebsiella oxytoca Not Detected     Klebsiella pneumoniae group Not Detected     Proteus spp. Not Detected     Salmonella spp. Not Detected     Serratia marcescens Not Detected     Haemophilus influenzae Not Detected     Neisseria meningitidis Not Detected     Pseudomonas aeruginosa Not Detected     Stenotrophomonas maltophilia Not Detected     Candida albicans Not Detected     Candida auris Not Detected     Candida glabrata Not Detected     Candida krusei Not Detected     Candida parapsilosis Not Detected     Candida tropicalis Not Detected     Cryptococcus neoformans/gattii Not Detected    Narrative:      The [employer] BCID2 Panel is a multiplexed nucleic acid test intended for the use with [employer]® 2.0 or [employer]® Feniks Systems for the simultaneous qualitative detection and identification of multiple bacterial and yeast nucleic acids and select genetic determinants associated with antimicrobial resistance.  The [employer] BCID2 Panel test is performed directly on  blood culture samples identified as positive by a continuous monitoring blood culture system.  Results are intended to be interpreted in conjunction with Gram stain results.    Respiratory Culture [[id number]]  (Abnormal)  (Susceptibility) Collected: 01/12/24 1735    Order Status: Completed Specimen: Lung Aspirate from Endotracheal Aspirate Updated: 01/14/24 0704     Respiratory Culture Moderate Raoultella ornithinolytica     Comment: with normal respiratory niyah        GRAM STAIN Quality 2+      Moderate Gram Positive Rods      Few Gram Negative Rods      Few Gram positive cocci

## 2024-01-16 NOTE — PROGRESS NOTES
[facility] - [facility] ICU  Pulmonary Critical Care Note    Patient Name: Delio Daniel Jr.  MRN: [id number]  Admission Date: 12/19/2023  Hospital Length of Stay: 28 days  Code Status: Full Code  Attending Provider: Efrain Salcido MD  Primary Care Provider: Candy, Primary Doctor     Subjective:     HPI:   Delio Daniel Jr. is a 68 y.o. 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 His last known normal was 9:15 per EMS. Stroke protocol in ED initiated. Unofficial CT head showed possible dense R MCA. Pt taken to cath lab for BRAD thrombectomy. Admitted to ICU for post-operative care and monitoring.     Hospital Course/Significant events:  12/19/2023 - Admitted to ICU s/p right internal carotid artery thrombectomy  12/20/2023 - 12/20/2023 he had a rapid deterioration in his neurologic status with CT showing, as expected, a large right MCA distribution infarct with marked right-to-left shift. s/p craniectomy  12/29/23 - s/p tracheotomy  1/2/2024- PEG tube placed   1/7/23 - Catheter exchanged    24 Hour Interval History:  Patient with no acute events overnight. Repeat blood cultures once again with 1 of 2 bottles growing probable Staph. Patient remains on Vancomycin and Levofloxacin. TTE was obtained yesterday with no endocarditis noted. Currently still pending placement with LTAC but patient is stable for discharge at this time.    Past Medical History:   Diagnosis Date    Arthritis     Atrial fibrillation     BPH (benign prostatic hyperplasia)     Cardiac arrest     Coronary artery disease     Cyst, kidney, acquired     Diverticulosis      Hyperlipidemia     Hypertension     MI (myocardial infarction)     Obesity     Steatosis of liver      Past Surgical History:   Procedure Laterality Date    A-V CARDIAC PACEMAKER INSERTION Right     CARDIAC CATHETERIZATION      COLONOSCOPY W/ BIOPSIES      CRANIECTOMY Right 12/20/2023    Procedure: CRANIECTOMY;  Surgeon: Artem Can MD;  Location: [facility] OR;  Service: Neurosurgery;  Laterality: Right;    ESOPHAGOGASTRODUODENOSCOPY W/ PEG N/A 1/2/2024    Procedure: PEG;  Surgeon: Tani Day MD;  Location: [facility] ENDOSCOPY;  Service: Gastroenterology;  Laterality: N/A;    excision of colon      TRACHEOSTOMY N/A 12/29/2023    Procedure: CREATION, TRACHEOSTOMY;  Surgeon: Patricia Winslow MD;  Location: [facility] OR;  Service: ENT;  Laterality: N/A;  REQ 1130 //  NEEDS 2 SCRUBS     Social History     Socioeconomic History    Marital status:     Number of children: 9   Occupational History    Occupation: retired   Tobacco Use    Smoking status: Former    Smokeless tobacco: Never   Substance and Sexual Activity    Alcohol use: Not Currently    Drug use: Not Currently    Sexual activity: Not Currently     Partners: Female     Social Determinants of Health     Financial Resource Strain: Low Risk  (10/19/2022)    Overall Financial Resource Strain (CARDIA)     Difficulty of Paying Living Expenses: Not hard at all   Food Insecurity: No Food Insecurity (10/19/2022)    Hunger Vital Sign     Worried About Running Out of Food in the Last Year: Never true     Ran Out of Food in the Last Year: Never true   Transportation Needs: No Transportation Needs (10/19/2022)    PRAPARE - Transportation     Lack of Transportation (Medical): No     Lack of Transportation (Non-Medical): No   Physical Activity: Sufficiently Active (10/19/2022)    Exercise Vital Sign     Days of Exercise per Week: 6 days     Minutes of Exercise per Session: 60 min   Stress: No Stress Concern Present (10/19/2022)    [employer] [place] [employer]  [employer] - Occupational Stress Questionnaire     Feeling of Stress : Not at all   Social Connections: Unknown (10/19/2022)    Social Connection and Isolation Panel [NHANES]     Frequency of Communication with Friends and Family: More than three times a week     Frequency of Social Gatherings with Friends and Family: More than three times a week     Attends Alevism Services: More than 4 times per year     Active Member of Clubs or Organizations: No     Attends Club or Organization Meetings: Never   Housing Stability: Low Risk  (10/19/2022)    Housing Stability Vital Sign     Unable to Pay for Housing in the Last Year: No     Number of Places Lived in the Last Year: 1     Unstable Housing in the Last Year: No     Current Outpatient Medications   Medication Instructions    aspirin 81 mg, Per G Tube, Daily    atorvastatin (LIPITOR) 10 mg, Per G Tube, Daily    carvediloL (COREG) 3.125 mg, Per G Tube, 2 times daily    diltiaZEM (CARDIZEM) 240 mg, Per G Tube, Daily    guaiFENesin 100 mg/5 ml (ROBITUSSIN) 400 mg, Per G Tube, Every 8 hours    losartan (COZAAR) 50 mg, Per G Tube, Daily    polyethylene glycol (GLYCOLAX) 17 g, Per G Tube, Daily PRN    QUEtiapine (SEROQUEL) 25 mg, Per G Tube, 2 times daily     Current Inpatient Medications   aspirin  81 mg Per G Tube Daily    atorvastatin  10 mg Per G Tube Daily    carvediloL  3.125 mg Per G Tube BID    diltiaZEM  240 mg Per G Tube Daily    docusate  100 mg Per G Tube BID    enoxparin  1 mg/kg Subcutaneous Q12H (treatment, non-standard time)    famotidine (PF)  20 mg Intravenous Daily    guaiFENesin 100 mg/5 ml  400 mg Per G Tube Q8H    levetiracetam  1,000 mg Per G Tube BID    levoFLOXacin  750 mg Intravenous Q24H    losartan  50 mg Per G Tube Daily    mannitol 20%  75 g Intravenous Once    neomycin-bacitracin-polymyxin   Topical (Top) BID    potassium bicarbonate  20 mEq Oral Once    QUEtiapine  25 mg Per G Tube BID    vancomycin (VANCOCIN) IV (PEDS and ADULTS)  1,750 mg  Intravenous Q12H     Current Intravenous Infusions   sodium chloride 0.9% 10 mL/hr at 01/15/24 1726    dexmedeTOMIDine (Precedex) infusion (titrating) 0.8 mcg/kg/hr (01/16/24 0011)    fentanyl Stopped (12/31/23 0634)    NORepinephrine bitartrate-D5W Stopped (12/20/23 1929)       Review of Systems   Unable to perform ROS: Critical illness   All other systems reviewed and are negative.     Objective:       Intake/Output Summary (Last 24 hours) at 1/16/2024 0536  Last data filed at 1/16/2024 0400  Gross per 24 hour   Intake 2849.98 ml   Output 3450 ml   Net -600.02 ml       Vital Signs (Most Recent):  Temp: 98.4 °F (36.9 °C) (01/16/24 0400)  Pulse: 85 (01/16/24 0530)  Resp: (!) 24 (01/16/24 0530)  BP: (!) 146/103 (01/16/24 0400)  SpO2: 98 % (01/16/24 0530)  Body mass index is 36.88 kg/m².  Weight: 119.9 kg (264 lb 5.3 oz) Vital Signs (24h Range):  Temp:  [98.2 °F (36.8 °C)-98.8 °F (37.1 °C)] 98.4 °F (36.9 °C)  Pulse:  [62-96] 85  Resp:  [15-28] 24  SpO2:  [95 %-100 %] 98 %  BP: (113-157)/() 146/103     Physical Exam  General: No acute distress. Sleeping at this timee.  HEENT: Normocephalic, atraumatic. Face symmetric.    Cardiovascular: Regular rate & rhythm  Pulmonary: Bilateral symmetric chest rise, patient mechanically ventilated via trach  Abdominal:  non-distended, soft, round, positive bowel sounds  Extremities: No clubbing or cyanosis.  1+ pitting edema distal LUE  Neuro:   trach on the vent; sedated on precedex.  Patient has non purposeful movements of RUE, does not follow commands.      Lines/Drains/Airways       Peripherally Inserted Central Catheter Line  Duration             PICC Triple Lumen 01/15/24 1424 right basilic <1 day              Drain  Duration                  Gastrostomy/Enterostomy 01/02/24 1230 LUQ 13 days         Fecal Incontinence  01/07/24 0357 9 days         Urethral Catheter 01/14/24 0521 Silicone 2 days              Airway  Duration             Adult Surgical Airway  12/29/23 1229 17 days                    Significant Labs:  Lab Results   Component Value Date    WBC 5.44 01/15/2024    HGB 10.0 (L) 01/15/2024    HCT 31.2 (L) 01/15/2024    MCV 88.9 01/15/2024     01/15/2024       BMP  Lab Results   Component Value Date     01/15/2024    K 3.4 (L) 01/15/2024    CHLORIDE 104 01/15/2024    CO2 28 01/15/2024    BUN 14.0 01/15/2024    CREATININE 0.69 (L) 01/15/2024    CALCIUM 7.9 (L) 01/15/2024    AGAP 7.0 01/04/2024    EGFRNONAA 61 04/23/2022     ABG  Recent Labs   Lab 01/10/24  0925   PH 7.520*   PO2 77.0*   PCO2 44.0   HCO3 35.9*   POCBASEDEF 11.70         Mechanical Ventilation Support:  Vent Mode: VOLUME SIMV (01/16/24 0530)  Set Rate: 10 BPM (01/16/24 0530)  Vt Set: 460 mL (01/16/24 0530)  Pressure Support: 10 cmH20 (01/16/24 0530)  PEEP/CPAP: 5 cmH20 (01/16/24 0530)  Oxygen Concentration (%): 30 (01/16/24 0530)  Peak Airway Pressure: 27 cmH20 (01/16/24 0530)  Total Ve: 10.1 L/m (01/16/24 0530)  F/VT Ratio<105 (RSBI): (!) 57.14 (01/16/24 0530)      Significant Imaging:  No results found in the last 24 hours.       Assessment/Plan:     Assessment  CVA s/p right ICA and MCA M3  branch thrombectomy s/p craniectomy with hemorrhagic conversion  Superficial thrombosis in left cephalic vein  Confirmed by DVT U/S on 12/26/2023  Acute hypoxic respiratory failure requiring intubation  Atrial fibrillation w/ RVR-rate now controlled.  Onychomycosis  HX of CAD, HTN, HLD, ADA  Hypernatremia  Urine osmolality indicative of extrarenal process, likely 2/2 NG tube as well as lack of intake  Febrile, possibly neurogenic  Leukocytosis - resolved  WBC 12.10 on 1/12/2024  Resolved the following ay  Pan culture results pending      Plan  - Free water flushes to PEG Currently at 210 ml q 4 hours. Continue TF as tolerated.   -Continue efforts to wean sedation and mechanical ventilation wean with trach as tolerated  -Bowel regimen (Miralax TID and Senna BID) stopped due to excessive number  of BM   -Neurosurgery signed off on 1/3/24   -Bone flap precautions and continue helmet use out of bed   -Seizure precautions and continue Keppra BID via Gtube   -Will require outpatient referral from placement back to outpatient neurosurgery office for consideration of cranioplasty   -Gastroenterology signed off on 1/3/24   -Continue Tfs and PEG care   -Neurology signed off on 12/22/23   -SBP goal <160    -Continue ASA 81mg qd   -Cardiology signed off on 12/25/23   -Continue Diltiazem 240mg qd, Losartan and Metoprolol tartrate 100mg BID    -Lopressor IV PRN for HR>120   -Remains on full-dose Lovenox for left cephalic vein thrombosis  -Urology signed off 1/9/24   -Continue antibiotic ointment with underwood care  -elevation/ice  -Blood culture 1 of 2 bottles probable Staph epi, respiratory culture: Raoultella ornithinolytica, and urine culture Enterobacter cloacae  -Continue Levofloxacin and Vancomycin (Day 3)  -Stable for discharge at this time    DVT ppx: FD Lovenox   GI ppx: Casey Corbin MD   Pulmonary Critical Care Medicine  [facility] - [facility] ICU  DOS: 01/16/2024

## 2024-01-16 NOTE — PLAN OF CARE
Problem: Adult Inpatient Plan of Care  Goal: Plan of Care Review  Outcome: Ongoing, Progressing  Goal: Patient-Specific Goal (Individualized)  Outcome: Ongoing, Progressing  Goal: Absence of Hospital-Acquired Illness or Injury  Outcome: Ongoing, Progressing  Goal: Optimal Comfort and Wellbeing  Outcome: Ongoing, Progressing  Goal: Readiness for Transition of Care  Outcome: Ongoing, Progressing     Problem: Skin Injury Risk Increased  Goal: Skin Health and Integrity  Outcome: Ongoing, Progressing     Problem: Adjustment to Illness (Stroke, Ischemic/Transient Ischemic Attack)  Goal: Optimal Coping  Outcome: Ongoing, Progressing     Problem: Bowel Elimination Impaired (Stroke, Ischemic/Transient Ischemic Attack)  Goal: Effective Bowel Elimination  Outcome: Ongoing, Progressing     Problem: Cerebral Tissue Perfusion (Stroke, Ischemic/Transient Ischemic Attack)  Goal: Optimal Cerebral Tissue Perfusion  Outcome: Ongoing, Progressing     Problem: Cognitive Impairment (Stroke, Ischemic/Transient Ischemic Attack)  Goal: Optimal Cognitive Function  Outcome: Ongoing, Progressing     Problem: Communication Impairment (Stroke, Ischemic/Transient Ischemic Attack)  Goal: Improved Communication Skills  Outcome: Ongoing, Progressing     Problem: Functional Ability Impaired (Stroke, Ischemic/Transient Ischemic Attack)  Goal: Optimal Functional Ability  Outcome: Ongoing, Progressing     Problem: Respiratory Compromise (Stroke, Ischemic/Transient Ischemic Attack)  Goal: Effective Oxygenation and Ventilation  Outcome: Ongoing, Progressing     Problem: Sensorimotor Impairment (Stroke, Ischemic/Transient Ischemic Attack)  Goal: Improved Sensorimotor Function  Outcome: Ongoing, Progressing     Problem: Swallowing Impairment (Stroke, Ischemic/Transient Ischemic Attack)  Goal: Optimal Eating and Swallowing without Aspiration  Outcome: Ongoing, Progressing     Problem: Urinary Elimination Impaired (Stroke, Ischemic/Transient Ischemic  Attack)  Goal: Effective Urinary Elimination  Outcome: Ongoing, Progressing     Problem: Fall Injury Risk  Goal: Absence of Fall and Fall-Related Injury  Outcome: Ongoing, Progressing     Problem: Infection  Goal: Absence of Infection Signs and Symptoms  Outcome: Ongoing, Progressing     Problem: Impaired Wound Healing  Goal: Optimal Wound Healing  Outcome: Ongoing, Progressing     Problem: Communication Impairment (Mechanical Ventilation, Invasive)  Goal: Effective Communication  Outcome: Ongoing, Progressing     Problem: Device-Related Complication Risk (Mechanical Ventilation, Invasive)  Goal: Optimal Device Function  Outcome: Ongoing, Progressing     Problem: Inability to Wean (Mechanical Ventilation, Invasive)  Goal: Mechanical Ventilation Liberation  Outcome: Ongoing, Progressing     Problem: Nutrition Impairment (Mechanical Ventilation, Invasive)  Goal: Optimal Nutrition Delivery  Outcome: Ongoing, Progressing     Problem: Skin and Tissue Injury (Mechanical Ventilation, Invasive)  Goal: Absence of Device-Related Skin and Tissue Injury  Outcome: Ongoing, Progressing     Problem: Ventilator-Induced Lung Injury (Mechanical Ventilation, Invasive)  Goal: Absence of Ventilator-Induced Lung Injury  Outcome: Ongoing, Progressing     Problem: Communication Impairment (Artificial Airway)  Goal: Effective Communication  Outcome: Ongoing, Progressing     Problem: Device-Related Complication Risk (Artificial Airway)  Goal: Optimal Device Function  Outcome: Ongoing, Progressing     Problem: Skin and Tissue Injury (Artificial Airway)  Goal: Absence of Device-Related Skin or Tissue Injury  Outcome: Ongoing, Progressing

## 2024-01-16 NOTE — NURSING
Nurses Note -- 4 Eyes      1/16/2024   2:53 PM      Skin assessed during: Daily Assessment      [x] No Altered Skin Integrity Present    [x]Prevention Measures Documented      [] Yes- Altered Skin Integrity Present or Discovered   [] LDA Added if Not in Epic (Describe Wound)   [] New Altered Skin Integrity was Present on Admit and Documented in LDA   [] Wound Image Taken    Wound Care Consulted? No    Attending Nurse:  Kenn Kumar RN    Second RN/Staff Member:  Brandyn Baumann RN

## 2024-01-16 NOTE — DISCHARGE SUMMARY
[facility]  HOSPITAL MEDICINE   DISCHARGE SUMMARY    Patient Name: Delio Daniel Jr.  MRN: [id number]  Admission Date: 12/19/2023  Hospital Length of Stay: 28 days  Discharge Date and Time: 01/16/2024  Discharge Provider: Amina Dolan  Primary Care Provider: Candy, Primary Doctor      LUIS  Delio Daniel Jr. is a 68 y.o. 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 His last known normal was 9:15 per EMS. Stroke protocol in ED initiated. Unofficial CT head showed possible dense R MCA. Pt taken to cath lab for BRAD thrombectomy. Admitted to ICU for post-operative care and monitoring.     HOSPITAL COURSE  Patient underwent R ICA thrombectomy but subsequently had rapid deterioration in his neurologic status on POD#1 with finding of large R MCA infarct with marked right to left shift status post craniectomy on 12/20/2023. Patient did not have meaningful recovery of neurologic status and had tracheotomy on 12/29/2023 and PEG on 1/2/2024. Patient continues to require mechanical ventilation with vent mode weaned to SIMV with pressure support of 10 and FiO2 of 35%. Weaned off vasopressors. Remains on sedation with precedex at a rate of 0.8 mcg/kg/hr. Throughout his stay patient noted to be febrile, however this is likely neurogenic. Patient was noted to have an elevated WBC on 1/12 with subsequent infectious workup significant for Staph epidermidis bacteremia and Enterobacter cloacae UTI. Continue Vancomycin 1.75 g IV BID for a total 14-day course (end date 1/27/2024) and Levofloxacin 750 mg IV daily for a total 7-day course (end date 1/20/2024).  "Patient is cleared for discharge to LTAC     PHYSICAL EXAM  BP (!) 149/109 (BP Location: Left arm, Patient Position: Lying)   Pulse 76   Temp 98.5 °F (36.9 °C) (Oral)   Resp (!) 22   Ht 5' 10.98" (1.803 m)   Wt 119.9 kg (264 lb 5.3 oz)   SpO2 99%   BMI 36.88 kg/m²      General: No acute distress. Sleeping at this timee.  HEENT: Normocephalic, atraumatic. Face symmetric.    Cardiovascular: Regular rate & rhythm  Pulmonary: Bilateral symmetric chest rise, patient mechanically ventilated via trach  Abdominal:  non-distended, soft, round, positive bowel sounds  Extremities: No clubbing or cyanosis.  1+ pitting edema distal LUE  Neuro:   trached on th vent; sedated on precedex.  Patient has non purposeful movements of RUE, does not follow commands        DISCHARGE DIAGNOSIS  -CVA, R MCA with hemorrhagic conversion  -Superficial thrombosis in left cephalic vein  -Acute hypoxic respiratory failure requiring intubation  -Bacteremia  -UTI      _____________________________________________________________________________      DISCHARGE MEDICATION RECONCILIATION  Current Discharge Medication List        START taking these medications    Details   carvediloL (COREG) 3.125 MG tablet 1 tablet (3.125 mg total) by Per G Tube route 2 (two) times daily.  Qty: 60 tablet, Refills: 11    Comments: .      diltiaZEM (CARDIZEM) 120 MG tablet 2 tablets (240 mg total) by Per G Tube route once daily.  Qty: 60 tablet, Refills: 11      guaiFENesin 100 mg/5 ml (ROBITUSSIN) 100 mg/5 mL syrup 20 mLs (400 mg total) by Per G Tube route every 8 (eight) hours. for 10 days  Qty: 473 mL, Refills: 0      polyethylene glycol (GLYCOLAX) 17 gram PwPk 17 g by Per G Tube route daily as needed for Constipation.  Qty: 20 each, Refills: 0      QUEtiapine (SEROQUEL) 25 MG Tab 1 tablet (25 mg total) by Per G Tube route 2 (two) times daily.  Qty: 60 tablet, Refills: 11           CONTINUE these medications which have CHANGED    Details   aspirin 81 MG Chew 1 " tablet (81 mg total) by Per G Tube route once daily.  Qty: 30 tablet, Refills: 11      atorvastatin (LIPITOR) 10 MG tablet 1 tablet (10 mg total) by Per G Tube route once daily.  Qty: 90 tablet, Refills: 3      losartan (COZAAR) 50 MG tablet 1 tablet (50 mg total) by Per G Tube route once daily.  Qty: 90 tablet, Refills: 3    Comments: .           STOP taking these medications       diltiaZEM (CARDIZEM CD) 360 MG 24 hr capsule Comments:   Reason for Stopping:         metoprolol succinate (TOPROL-XL) 200 MG 24 hr tablet Comments:   Reason for Stopping:         omeprazole (PRILOSEC) 20 MG capsule Comments:   Reason for Stopping:         potassium chloride (KLOR-CON) 20 mEq Pack Comments:   Reason for Stopping:         spironolactone (ALDACTONE) 50 MG tablet Comments:   Reason for Stopping:         torsemide (DEMADEX) 10 MG Tab Comments:   Reason for Stopping:         vitamin D (VITAMIN D3) 1000 units Tab Comments:   Reason for Stopping:         XARELTO 20 mg Tab Comments:   Reason for Stopping:         albuterol (PROVENTIL/VENTOLIN HFA) 90 mcg/actuation inhaler Comments:   Reason for Stopping:         cetirizine (ZYRTEC) 10 MG tablet Comments:   Reason for Stopping:                  CONSULTS  Consults (From admission, onward)          Status Ordering Provider     Pharmacy to dose Vancomycin consult  Once        Provider:  (Not yet assigned)   See Navneet for full Linked Orders Report.    Acknowledged JUHI GROSSMAN     Inpatient consult to Urology  Once        Provider:  Kirk Mullins MD    Completed CAITLYN ZIEGLER     Inpatient consult to Midline team  Once        Provider:  (Not yet assigned)    Acknowledged JOY MCCLURE     Inpatient consult to Respiratory Care  Once        Provider:  (Not yet assigned)    Acknowledged KODI CARBAJAL     Inpatient consult to Gastroenterology  Once        Provider:  Mark Escalona MD    Completed KODI CARBAJAL     Inpatient consult to ENT/Otolaryngology   Once        Provider:  Patricia Winslow MD    Acknowledged KOID CARBAJAL     Inpatient consult to Orthotics  Once        Provider:  Tano Perera [facility]    Acknowledged TAYO VALDERRAMA     Inpatient consult to Cardiology  Once        Provider:  Kye Parekh MD    Completed KODI CARBAJAL     Inpatient consult to Neurosurgery  Once        Provider:  Artem Can MD    Completed SCOT LEE     Inpatient consult to Social Work/Case Management  Once        Provider:  (Not yet assigned)    Completed DALILA SHERIFF     Inpatient consult to Vascular (Stroke) Neurology  Once        Provider:  Delonte Randle MD    Completed DALILA SHERIFF     Inpatient consult to Vascular (Stroke) Neurology  Once        Provider:  Delonte Rnadle MD    Completed WADE STARK              FOLLOW UP          DISCHARGE INSTRUCTIONS  Explained in detail to the patient about the discharge plan, medications, and follow-up visits. Pt understands and agrees with the treatment plan.  Discharged Condition: stable  Diet: tube feeds currently Impact Peptide 1.5 at 50 mL/hr with 210 mL FWF every 4 hours; ProSource TF20 BID  Discharge to: Long Term Acute Care    TIME SPENT ON DISCHARGE  35 minutes        Amina Dolan MD  [facility] Internal Medicine, -2  1/16/2024

## 2024-01-17 ENCOUNTER — HOSPITAL ENCOUNTER (INPATIENT)
Facility: HOSPITAL | Age: 69
LOS: 7 days | Discharge: NURSING, REHAB OR LONG-TERM CARE | DRG: 100 | End: 2024-01-24
Attending: EMERGENCY MEDICINE | Admitting: INTERNAL MEDICINE
Payer: MEDICARE

## 2024-01-17 DIAGNOSIS — K92.2 UPPER GI BLEED: ICD-10-CM

## 2024-01-17 DIAGNOSIS — I48.91 ATRIAL FIBRILLATION WITH RVR: Primary | ICD-10-CM

## 2024-01-17 DIAGNOSIS — I45.5 SINUS PAUSE: ICD-10-CM

## 2024-01-17 DIAGNOSIS — J96.11 CHRONIC RESPIRATORY FAILURE WITH HYPOXIA: ICD-10-CM

## 2024-01-17 DIAGNOSIS — D64.9 CHRONIC ANEMIA: ICD-10-CM

## 2024-01-17 DIAGNOSIS — R11.10 VOMITING: ICD-10-CM

## 2024-01-17 DIAGNOSIS — R56.9 SEIZURE: ICD-10-CM

## 2024-01-17 PROBLEM — K92.0 COFFEE GROUND EMESIS: Status: ACTIVE | Noted: 2024-01-17

## 2024-01-17 LAB
ALBUMIN SERPL-MCNC: 2.6 G/DL (ref 3.4–4.8)
ALBUMIN/GLOB SERPL: 0.7 RATIO (ref 1.1–2)
ALP SERPL-CCNC: 55 UNIT/L (ref 40–150)
ALT SERPL-CCNC: 28 UNIT/L (ref 0–55)
AMORPH URATE CRY URNS QL MICRO: ABNORMAL /UL
APPEARANCE UR: ABNORMAL
APTT PPP: 30.4 SECONDS (ref 23.2–33.7)
AST SERPL-CCNC: 32 UNIT/L (ref 5–34)
BACTERIA #/AREA URNS AUTO: ABNORMAL /HPF
BACTERIA BLD CULT: ABNORMAL
BACTERIA BLD CULT: NORMAL
BASOPHILS # BLD AUTO: 0.05 X10(3)/MCL
BASOPHILS NFR BLD AUTO: 0.5 %
BILIRUB SERPL-MCNC: 0.5 MG/DL
BILIRUB UR QL STRIP.AUTO: NEGATIVE
BUN SERPL-MCNC: 15.1 MG/DL (ref 8.4–25.7)
CALCIUM SERPL-MCNC: 8 MG/DL (ref 8.8–10)
CHLORIDE SERPL-SCNC: 104 MMOL/L (ref 98–107)
CO2 SERPL-SCNC: 30 MMOL/L (ref 23–31)
COLOR UR AUTO: YELLOW
CREAT SERPL-MCNC: 0.82 MG/DL (ref 0.73–1.18)
EOSINOPHIL # BLD AUTO: 0.09 X10(3)/MCL (ref 0–0.9)
EOSINOPHIL NFR BLD AUTO: 0.9 %
ERYTHROCYTE [DISTWIDTH] IN BLOOD BY AUTOMATED COUNT: 15 % (ref 11.5–17)
FLUAV AG UPPER RESP QL IA.RAPID: NOT DETECTED
FLUBV AG UPPER RESP QL IA.RAPID: NOT DETECTED
GFR SERPLBLD CREATININE-BSD FMLA CKD-EPI: >60 MLS/MIN/1.73/M2
GLOBULIN SER-MCNC: 3.8 GM/DL (ref 2.4–3.5)
GLUCOSE SERPL-MCNC: 126 MG/DL (ref 82–115)
GLUCOSE UR QL STRIP.AUTO: NORMAL
GRAM STN SPEC: ABNORMAL
HCT VFR BLD AUTO: 30.9 % (ref 42–52)
HCT VFR BLD AUTO: 32 % (ref 42–52)
HGB BLD-MCNC: 10 G/DL (ref 14–18)
HGB BLD-MCNC: 9.5 G/DL (ref 14–18)
IMM GRANULOCYTES # BLD AUTO: 0.12 X10(3)/MCL (ref 0–0.04)
IMM GRANULOCYTES NFR BLD AUTO: 1.2 %
INR PPP: 1.1
KETONES UR QL STRIP.AUTO: NEGATIVE
LACTATE SERPL-SCNC: 1 MMOL/L (ref 0.5–2.2)
LEUKOCYTE ESTERASE UR QL STRIP.AUTO: NEGATIVE
LYMPHOCYTES # BLD AUTO: 1.82 X10(3)/MCL (ref 0.6–4.6)
LYMPHOCYTES NFR BLD AUTO: 18 %
MCH RBC QN AUTO: 28.5 PG (ref 27–31)
MCHC RBC AUTO-ENTMCNC: 31.3 G/DL (ref 33–36)
MCV RBC AUTO: 91.2 FL (ref 80–94)
MONOCYTES # BLD AUTO: 0.75 X10(3)/MCL (ref 0.1–1.3)
MONOCYTES NFR BLD AUTO: 7.4 %
MUCOUS THREADS URNS QL MICRO: ABNORMAL /LPF
NEUTROPHILS # BLD AUTO: 7.26 X10(3)/MCL (ref 2.1–9.2)
NEUTROPHILS NFR BLD AUTO: 72 %
NITRITE UR QL STRIP.AUTO: NEGATIVE
NRBC BLD AUTO-RTO: 0 %
PH UR STRIP.AUTO: 7.5 [PH]
PLATELET # BLD AUTO: 219 X10(3)/MCL (ref 130–400)
PMV BLD AUTO: 10.1 FL (ref 7.4–10.4)
POTASSIUM SERPL-SCNC: 3.4 MMOL/L (ref 3.5–5.1)
PROT SERPL-MCNC: 6.4 GM/DL (ref 5.8–7.6)
PROT UR QL STRIP.AUTO: ABNORMAL
PROTHROMBIN TIME: 13.9 SECONDS (ref 12.5–14.5)
RBC # BLD AUTO: 3.51 X10(6)/MCL (ref 4.7–6.1)
RBC #/AREA URNS AUTO: ABNORMAL /HPF
RBC UR QL AUTO: ABNORMAL
RSV A 5' UTR RNA NPH QL NAA+PROBE: NOT DETECTED
SARS-COV-2 RNA RESP QL NAA+PROBE: NOT DETECTED
SODIUM SERPL-SCNC: 144 MMOL/L (ref 136–145)
SP GR UR STRIP.AUTO: 1.02 (ref 1–1.03)
SQUAMOUS #/AREA URNS LPF: ABNORMAL /HPF
UROBILINOGEN UR STRIP-ACNC: NORMAL
VANCOMYCIN SERPL-MCNC: 8.2 UG/ML (ref 15–20)
WBC # SPEC AUTO: 10.09 X10(3)/MCL (ref 4.5–11.5)
WBC #/AREA URNS AUTO: ABNORMAL /HPF

## 2024-01-17 PROCEDURE — 85610 PROTHROMBIN TIME: CPT | Performed by: EMERGENCY MEDICINE

## 2024-01-17 PROCEDURE — 63600175 PHARM REV CODE 636 W HCPCS: Performed by: INTERNAL MEDICINE

## 2024-01-17 PROCEDURE — 94002 VENT MGMT INPAT INIT DAY: CPT

## 2024-01-17 PROCEDURE — 63600175 PHARM REV CODE 636 W HCPCS: Performed by: SPECIALIST

## 2024-01-17 PROCEDURE — 96376 TX/PRO/DX INJ SAME DRUG ADON: CPT

## 2024-01-17 PROCEDURE — 63600175 PHARM REV CODE 636 W HCPCS: Performed by: EMERGENCY MEDICINE

## 2024-01-17 PROCEDURE — 94003 VENT MGMT INPAT SUBQ DAY: CPT

## 2024-01-17 PROCEDURE — C9113 INJ PANTOPRAZOLE SODIUM, VIA: HCPCS | Performed by: INTERNAL MEDICINE

## 2024-01-17 PROCEDURE — 99900026 HC AIRWAY MAINTENANCE (STAT)

## 2024-01-17 PROCEDURE — 85018 HEMOGLOBIN: CPT | Performed by: INTERNAL MEDICINE

## 2024-01-17 PROCEDURE — 25000003 PHARM REV CODE 250: Performed by: INTERNAL MEDICINE

## 2024-01-17 PROCEDURE — 99900035 HC TECH TIME PER 15 MIN (STAT)

## 2024-01-17 PROCEDURE — 80053 COMPREHEN METABOLIC PANEL: CPT | Performed by: EMERGENCY MEDICINE

## 2024-01-17 PROCEDURE — 80202 ASSAY OF VANCOMYCIN: CPT | Performed by: INTERNAL MEDICINE

## 2024-01-17 PROCEDURE — C9113 INJ PANTOPRAZOLE SODIUM, VIA: HCPCS | Performed by: EMERGENCY MEDICINE

## 2024-01-17 PROCEDURE — 96375 TX/PRO/DX INJ NEW DRUG ADDON: CPT

## 2024-01-17 PROCEDURE — 0241U COVID/RSV/FLU A&B PCR: CPT | Performed by: EMERGENCY MEDICINE

## 2024-01-17 PROCEDURE — 87040 BLOOD CULTURE FOR BACTERIA: CPT | Performed by: EMERGENCY MEDICINE

## 2024-01-17 PROCEDURE — 20000000 HC ICU ROOM

## 2024-01-17 PROCEDURE — 85025 COMPLETE CBC W/AUTO DIFF WBC: CPT | Performed by: EMERGENCY MEDICINE

## 2024-01-17 PROCEDURE — 81001 URINALYSIS AUTO W/SCOPE: CPT | Performed by: EMERGENCY MEDICINE

## 2024-01-17 PROCEDURE — 96361 HYDRATE IV INFUSION ADD-ON: CPT

## 2024-01-17 PROCEDURE — 94761 N-INVAS EAR/PLS OXIMETRY MLT: CPT

## 2024-01-17 PROCEDURE — 85730 THROMBOPLASTIN TIME PARTIAL: CPT | Performed by: EMERGENCY MEDICINE

## 2024-01-17 PROCEDURE — 99291 CRITICAL CARE FIRST HOUR: CPT

## 2024-01-17 PROCEDURE — 25500020 PHARM REV CODE 255: Performed by: EMERGENCY MEDICINE

## 2024-01-17 PROCEDURE — 99222 1ST HOSP IP/OBS MODERATE 55: CPT | Mod: ,,, | Performed by: SPECIALIST

## 2024-01-17 PROCEDURE — 5A1955Z RESPIRATORY VENTILATION, GREATER THAN 96 CONSECUTIVE HOURS: ICD-10-PCS | Performed by: INTERNAL MEDICINE

## 2024-01-17 PROCEDURE — 27100171 HC OXYGEN HIGH FLOW UP TO 24 HOURS

## 2024-01-17 PROCEDURE — 25000003 PHARM REV CODE 250: Performed by: EMERGENCY MEDICINE

## 2024-01-17 PROCEDURE — 83605 ASSAY OF LACTIC ACID: CPT | Performed by: EMERGENCY MEDICINE

## 2024-01-17 PROCEDURE — 96365 THER/PROPH/DIAG IV INF INIT: CPT

## 2024-01-17 RX ORDER — METOPROLOL TARTRATE 1 MG/ML
5 INJECTION, SOLUTION INTRAVENOUS EVERY 5 MIN PRN
Status: DISCONTINUED | OUTPATIENT
Start: 2024-01-17 | End: 2024-01-24 | Stop reason: HOSPADM

## 2024-01-17 RX ORDER — MUPIROCIN 20 MG/G
OINTMENT TOPICAL 2 TIMES DAILY
Status: COMPLETED | OUTPATIENT
Start: 2024-01-17 | End: 2024-01-22

## 2024-01-17 RX ORDER — LABETALOL HYDROCHLORIDE 5 MG/ML
10 INJECTION, SOLUTION INTRAVENOUS EVERY 4 HOURS PRN
Status: DISCONTINUED | OUTPATIENT
Start: 2024-01-17 | End: 2024-01-24 | Stop reason: HOSPADM

## 2024-01-17 RX ORDER — PANTOPRAZOLE SODIUM 40 MG/10ML
80 INJECTION, POWDER, LYOPHILIZED, FOR SOLUTION INTRAVENOUS
Status: COMPLETED | OUTPATIENT
Start: 2024-01-17 | End: 2024-01-17

## 2024-01-17 RX ORDER — LEVOFLOXACIN 5 MG/ML
750 INJECTION, SOLUTION INTRAVENOUS
Status: COMPLETED | OUTPATIENT
Start: 2024-01-17 | End: 2024-01-21

## 2024-01-17 RX ORDER — LEVETIRACETAM 15 MG/ML
1500 INJECTION INTRAVASCULAR EVERY 12 HOURS
Status: DISCONTINUED | OUTPATIENT
Start: 2024-01-17 | End: 2024-01-24 | Stop reason: HOSPADM

## 2024-01-17 RX ORDER — CARVEDILOL 3.12 MG/1
3.12 TABLET ORAL 2 TIMES DAILY
Status: DISCONTINUED | OUTPATIENT
Start: 2024-01-17 | End: 2024-01-17

## 2024-01-17 RX ORDER — QUETIAPINE FUMARATE 25 MG/1
25 TABLET, FILM COATED ORAL 2 TIMES DAILY
Status: DISCONTINUED | OUTPATIENT
Start: 2024-01-18 | End: 2024-01-24 | Stop reason: HOSPADM

## 2024-01-17 RX ORDER — LORAZEPAM 2 MG/ML
2 INJECTION INTRAMUSCULAR
Status: COMPLETED | OUTPATIENT
Start: 2024-01-17 | End: 2024-01-17

## 2024-01-17 RX ORDER — ATORVASTATIN CALCIUM 10 MG/1
10 TABLET, FILM COATED ORAL DAILY
Status: DISCONTINUED | OUTPATIENT
Start: 2024-01-18 | End: 2024-01-24 | Stop reason: HOSPADM

## 2024-01-17 RX ORDER — DILTIAZEM HYDROCHLORIDE 60 MG/1
240 TABLET, FILM COATED ORAL DAILY
Status: DISCONTINUED | OUTPATIENT
Start: 2024-01-18 | End: 2024-01-24 | Stop reason: HOSPADM

## 2024-01-17 RX ORDER — LOSARTAN POTASSIUM 50 MG/1
50 TABLET ORAL DAILY
Status: DISCONTINUED | OUTPATIENT
Start: 2024-01-18 | End: 2024-01-24 | Stop reason: HOSPADM

## 2024-01-17 RX ORDER — LEVETIRACETAM 10 MG/ML
1000 INJECTION INTRAVASCULAR
Status: DISCONTINUED | OUTPATIENT
Start: 2024-01-17 | End: 2024-01-17

## 2024-01-17 RX ORDER — NAPROXEN SODIUM 220 MG/1
81 TABLET, FILM COATED ORAL DAILY
Status: DISCONTINUED | OUTPATIENT
Start: 2024-01-18 | End: 2024-01-17

## 2024-01-17 RX ORDER — ACETAMINOPHEN 10 MG/ML
1000 INJECTION, SOLUTION INTRAVENOUS ONCE
Status: COMPLETED | OUTPATIENT
Start: 2024-01-17 | End: 2024-01-17

## 2024-01-17 RX ORDER — HEPARIN SODIUM,PORCINE/D5W 25000/250
0-40 INTRAVENOUS SOLUTION INTRAVENOUS CONTINUOUS
Status: DISCONTINUED | OUTPATIENT
Start: 2024-01-17 | End: 2024-01-17

## 2024-01-17 RX ORDER — FENTANYL CITRATE 50 UG/ML
25 INJECTION, SOLUTION INTRAMUSCULAR; INTRAVENOUS ONCE
Status: COMPLETED | OUTPATIENT
Start: 2024-01-17 | End: 2024-01-17

## 2024-01-17 RX ORDER — SODIUM CHLORIDE, SODIUM LACTATE, POTASSIUM CHLORIDE, CALCIUM CHLORIDE 600; 310; 30; 20 MG/100ML; MG/100ML; MG/100ML; MG/100ML
INJECTION, SOLUTION INTRAVENOUS CONTINUOUS
Status: DISCONTINUED | OUTPATIENT
Start: 2024-01-17 | End: 2024-01-19

## 2024-01-17 RX ORDER — DILTIAZEM HYDROCHLORIDE 5 MG/ML
20 INJECTION INTRAVENOUS
Status: COMPLETED | OUTPATIENT
Start: 2024-01-17 | End: 2024-01-17

## 2024-01-17 RX ORDER — HYDRALAZINE HYDROCHLORIDE 20 MG/ML
10 INJECTION INTRAMUSCULAR; INTRAVENOUS EVERY 4 HOURS PRN
Status: DISCONTINUED | OUTPATIENT
Start: 2024-01-17 | End: 2024-01-24 | Stop reason: HOSPADM

## 2024-01-17 RX ORDER — CARVEDILOL 3.12 MG/1
3.12 TABLET ORAL 2 TIMES DAILY
Status: DISCONTINUED | OUTPATIENT
Start: 2024-01-18 | End: 2024-01-24

## 2024-01-17 RX ORDER — QUETIAPINE FUMARATE 25 MG/1
25 TABLET, FILM COATED ORAL 2 TIMES DAILY
Status: DISCONTINUED | OUTPATIENT
Start: 2024-01-17 | End: 2024-01-17

## 2024-01-17 RX ORDER — SODIUM CHLORIDE 0.9 % (FLUSH) 0.9 %
10 SYRINGE (ML) INJECTION
Status: DISCONTINUED | OUTPATIENT
Start: 2024-01-17 | End: 2024-01-24 | Stop reason: HOSPADM

## 2024-01-17 RX ORDER — POLYETHYLENE GLYCOL 3350 17 G/17G
17 POWDER, FOR SOLUTION ORAL DAILY PRN
Status: DISCONTINUED | OUTPATIENT
Start: 2024-01-17 | End: 2024-01-24 | Stop reason: HOSPADM

## 2024-01-17 RX ADMIN — PANTOPRAZOLE SODIUM 8 MG/HR: 40 INJECTION, POWDER, FOR SOLUTION INTRAVENOUS at 07:01

## 2024-01-17 RX ADMIN — SODIUM CHLORIDE 1000 ML: 9 INJECTION, SOLUTION INTRAVENOUS at 12:01

## 2024-01-17 RX ADMIN — DILTIAZEM HYDROCHLORIDE 20 MG: 5 INJECTION, SOLUTION INTRAVENOUS at 12:01

## 2024-01-17 RX ADMIN — DILTIAZEM HYDROCHLORIDE 5 MG/HR: 5 INJECTION INTRAVENOUS at 03:01

## 2024-01-17 RX ADMIN — LABETALOL HYDROCHLORIDE 10 MG: 5 INJECTION, SOLUTION INTRAVENOUS at 06:01

## 2024-01-17 RX ADMIN — SODIUM CHLORIDE, POTASSIUM CHLORIDE, SODIUM LACTATE AND CALCIUM CHLORIDE: 600; 310; 30; 20 INJECTION, SOLUTION INTRAVENOUS at 06:01

## 2024-01-17 RX ADMIN — IOPAMIDOL 100 ML: 755 INJECTION, SOLUTION INTRAVENOUS at 03:01

## 2024-01-17 RX ADMIN — PANTOPRAZOLE SODIUM 8 MG/HR: 40 INJECTION, POWDER, FOR SOLUTION INTRAVENOUS at 02:01

## 2024-01-17 RX ADMIN — ACETAMINOPHEN 1000 MG: 10 INJECTION, SOLUTION INTRAVENOUS at 12:01

## 2024-01-17 RX ADMIN — FENTANYL CITRATE 25 MCG: 50 INJECTION, SOLUTION INTRAMUSCULAR; INTRAVENOUS at 06:01

## 2024-01-17 RX ADMIN — MUPIROCIN: 20 OINTMENT TOPICAL at 09:01

## 2024-01-17 RX ADMIN — PANTOPRAZOLE SODIUM 80 MG: 40 INJECTION, POWDER, FOR SOLUTION INTRAVENOUS at 02:01

## 2024-01-17 RX ADMIN — LORAZEPAM 2 MG: 2 INJECTION INTRAMUSCULAR; INTRAVENOUS at 12:01

## 2024-01-17 RX ADMIN — LEVETIRACETAM INJECTION 1500 MG: 15 INJECTION INTRAVENOUS at 04:01

## 2024-01-17 RX ADMIN — LEVOFLOXACIN 750 MG: 750 INJECTION, SOLUTION INTRAVENOUS at 06:01

## 2024-01-17 RX ADMIN — VANCOMYCIN HYDROCHLORIDE 1750 MG: 500 INJECTION, POWDER, LYOPHILIZED, FOR SOLUTION INTRAVENOUS at 09:01

## 2024-01-17 RX ADMIN — DILTIAZEM HYDROCHLORIDE 10 MG/HR: 5 INJECTION INTRAVENOUS at 10:01

## 2024-01-17 NOTE — ED NOTES
Bed: EMS 03  Expected date:   Expected time:   Means of arrival:   Comments:  Craniotomy, Seizure, Vomiting

## 2024-01-17 NOTE — H&P
[facility] - Emergency Dept  Pulmonary Critical Care Note    Patient Name: Delio Daniel Jr.  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  Patient was recently transferred to [facility] 01/16/2024 but presents to the emergency department this afternoon with complaints of seizure-like activity and coffee-ground emesis.  Patient is unable to provide any history himself therefore HPI primarily obtained from chart review.  Patient was seen by Dr. Dewitt this morning at the [facility] and per his note this morning patient did have seizure-like activity that was abated with Keppra.  It is also mentioned that patient did have hematemesis this morning but this can not be quantified but per Dr. Dominguez note patient was having projectile vomiting however this was un quantified.  Since this event, the patient's tube feedings have been placed on hold.  Furthermore patient did also apparently have some tachycardia post seizure and some hypertension of which was corrected was some IV hydralazine.  On evaluation patient in the emergency department his pulse is 101, blood pressure is 152/95, he remains on the ventilator on SIMV 10/460/5/30%.  He is also on infusions of diltiazem, Protonix, and Keppra.    Hospital Course/Significant  events:      24 Hour Interval History:  NA    Past Medical History:   Diagnosis Date    Arthritis     Atrial fibrillation     BPH (benign prostatic hyperplasia)     Cardiac arrest     Coronary artery disease     Cyst, kidney, acquired     Diverticulosis     Hyperlipidemia     Hypertension     MI (myocardial infarction)     Obesity     Steatosis of liver     Stroke        Past Surgical History:   Procedure Laterality Date    A-V CARDIAC PACEMAKER INSERTION Right     CARDIAC CATHETERIZATION      COLONOSCOPY W/ BIOPSIES      CRANIECTOMY Right 12/20/2023    Procedure: CRANIECTOMY;  Surgeon: Artem Can MD;  Location: [facility] OR;  Service: Neurosurgery;  Laterality: Right;    ESOPHAGOGASTRODUODENOSCOPY W/ PEG N/A 1/2/2024    Procedure: PEG;  Surgeon: Tani Day MD;  Location: [facility] ENDOSCOPY;  Service: Gastroenterology;  Laterality: N/A;    excision of colon      TRACHEOSTOMY N/A 12/29/2023    Procedure: CREATION, TRACHEOSTOMY;  Surgeon: Patricia Winslow MD;  Location: [facility] OR;  Service: ENT;  Laterality: N/A;  REQ 1130 //  NEEDS 2 SCRUBS       Social History     Socioeconomic History    Marital status:     Number of children: 9   Occupational History    Occupation: retired   Tobacco Use    Smoking status: Never    Smokeless tobacco: Never   Substance and Sexual Activity    Alcohol use: Not Currently    Drug use: Not Currently    Sexual activity: Not Currently     Partners: Female     Social Determinants of Health     Financial Resource Strain: Low Risk  (10/19/2022)    Overall Financial Resource Strain (CARDIA)     Difficulty of Paying Living Expenses: Not hard at all   Food Insecurity: No Food Insecurity (10/19/2022)    Hunger Vital Sign     Worried About Running Out of Food in the Last Year: Never true     Ran Out of Food in the Last Year: Never true   Transportation Needs: No Transportation Needs (10/19/2022)    PRAPARE - Transportation     Lack of Transportation (Medical): No     Lack of  Transportation (Non-Medical): No   Physical Activity: Sufficiently Active (10/19/2022)    Exercise Vital Sign     Days of Exercise per Week: 6 days     Minutes of Exercise per Session: 60 min   Stress: No Stress Concern Present (10/19/2022)    [employer] [place] [employer] - Occupational Stress Questionnaire     Feeling of Stress : Not at all   Social Connections: Unknown (10/19/2022)    Social Connection and Isolation Panel [NHANES]     Frequency of Communication with Friends and Family: More than three times a week     Frequency of Social Gatherings with Friends and Family: More than three times a week     Attends Druze Services: More than 4 times per year     Active Member of Clubs or Organizations: No     Attends Club or Organization Meetings: Never   Housing Stability: Low Risk  (10/19/2022)    Housing Stability Vital Sign     Unable to Pay for Housing in the Last Year: No     Number of Places Lived in the Last Year: 1     Unstable Housing in the Last Year: No           Current Outpatient Medications   Medication Instructions    aspirin 81 mg, Per G Tube, Daily    atorvastatin (LIPITOR) 10 mg, Per G Tube, Daily    carvediloL (COREG) 3.125 mg, Per G Tube, 2 times daily    diltiaZEM (CARDIZEM) 240 mg, Per G Tube, Daily    guaiFENesin 100 mg/5 ml (ROBITUSSIN) 400 mg, Per G Tube, Every 8 hours    losartan (COZAAR) 50 mg, Per G Tube, Daily    polyethylene glycol (GLYCOLAX) 17 g, Per G Tube, Daily PRN    QUEtiapine (SEROQUEL) 25 mg, Per G Tube, 2 times daily       Current Inpatient Medications   [START ON 1/18/2024] atorvastatin  10 mg Per G Tube Daily    carvediloL  3.125 mg Per G Tube BID    [START ON 1/18/2024] diltiaZEM  240 mg Per G Tube Daily    levETIRAcetam (Keppra) IV (PEDS and ADULTS)  1,500 mg Intravenous Q12H    [START ON 1/18/2024] losartan  50 mg Per G Tube Daily    QUEtiapine  25 mg Per G Tube BID       Current Intravenous Infusions   dilTIAZem 5 mg/hr (01/17/24 1516)    pantoprazole  (PROTONIX) IV infusion 8 mg/hr (01/17/24 1419)         Review of Systems   Unable to perform ROS: Medical condition          Objective:       Intake/Output Summary (Last 24 hours) at 1/17/2024 1659  Last data filed at 1/17/2024 1020  Gross per 24 hour   Intake 950 ml   Output 2505 ml   Net -1555 ml         Vital Signs (Most Recent):  Temp: 99.9 °F (37.7 °C) (01/17/24 1438)  Pulse: 101 (01/17/24 1630)  Resp: 20 (01/17/24 1630)  BP: (!) 152/95 (01/17/24 1630)  SpO2: 97 % (01/17/24 1630)  Body mass index is 37.31 kg/m².  Weight: 117.9 kg (260 lb) Vital Signs (24h Range):  Temp:  [98.7 °F (37.1 °C)-101.1 °F (38.4 °C)] 99.9 °F (37.7 °C)  Pulse:  [] 101  Resp:  [13-37] 20  SpO2:  [94 %-100 %] 97 %  BP: (115-182)/() 152/95     Physical Exam  Vitals and nursing note reviewed.   Constitutional:       General: He is not in acute distress.     Appearance: He is not toxic-appearing.      Comments: Intubated per trach   HENT:      Head: Normocephalic and atraumatic.   Cardiovascular:      Rate and Rhythm: Tachycardia present. Rhythm irregular.      Pulses: Normal pulses.      Heart sounds: No murmur heard.     No gallop.   Pulmonary:      Effort: No respiratory distress.      Breath sounds: No stridor. No wheezing, rhonchi or rales.   Abdominal:      General: Abdomen is flat. There is no distension.      Palpations: Abdomen is soft.   Genitourinary:     Comments: There is a Fernandez and rectal tube present  Musculoskeletal:         General: No swelling or deformity.      Left lower leg: No edema.   Skin:     General: Skin is warm.      Coloration: Skin is not jaundiced.      Findings: No bruising.   Neurological:      Comments:     Patient is essentially unresponsive/noninteractive  Has spontaneous purposeless movements of the right upper and lower extremity  Pupils are equal in size  Random spontaneous movement a pupils without tracking           Lines/Drains/Airways       Peripherally Inserted Central Catheter Line   Duration             PICC Triple Lumen 01/15/24 1424 right basilic 2 days              Drain  Duration                  Gastrostomy/Enterostomy 01/02/24 1230 LUQ 15 days         Fecal Incontinence  01/07/24 0357 10 days         Urethral Catheter 01/14/24 0521 Silicone 3 days              Airway  Duration             Adult Surgical Airway 12/29/23 1229 19 days                    Significant Labs:    Lab Results   Component Value Date    WBC 10.09 01/17/2024    HGB 10.0 (L) 01/17/2024    HCT 32.0 (L) 01/17/2024    MCV 91.2 01/17/2024     01/17/2024           BMP  Lab Results   Component Value Date     01/17/2024    K 3.4 (L) 01/17/2024    CHLORIDE 104 01/17/2024    CO2 30 01/17/2024    BUN 15.1 01/17/2024    CREATININE 0.82 01/17/2024    CALCIUM 8.0 (L) 01/17/2024    AGAP 7.0 01/04/2024    EGFRNONAA 61 04/23/2022         ABG  Recent Labs   Lab 01/17/24  0517 01/17/24  0533   PH 7.518* 7.518   PO2 99 99   PCO2 40.1 40.1  40.1   HCO3 32.6*  --        Mechanical Ventilation Support:  Vent Mode: SIMV (VC) + PS (01/17/24 1551)  Set Rate: 10 BPM (01/17/24 1551)  Vt Set: 460 mL (01/17/24 1551)  Pressure Support: 7 cmH20 (01/17/24 1551)  PEEP/CPAP: 5 cmH20 (01/17/24 1551)  Oxygen Concentration (%): 30 (01/17/24 1551)  Peak Airway Pressure: 23 cmH20 (01/17/24 1551)  Total Ve: 10.9 L/m (01/17/24 1551)  F/VT Ratio<105 (RSBI): (!) 56.76 (01/17/24 1551)      Significant Imaging:  I have reviewed the pertinent imaging within the past 24 hours.        Assessment/Plan:     Assessment  Seizure-like activity  Hematemesis  AFib RVR  CVA s/p right ICA and MCA M3 branch thrombectomy with hemorrhagic conversion s/p craniectomy  Right frontotemporal parietal DCH 12/23/2023  Hypoxic respiratory failure requiring intubation s/p trach  Superficial thrombosis in left cephalic vein seen on DVT ultrasound 12/26/2023  Enterobacter UTI on culture 01/14/2024  Pansensitive Raoultella Ornithinolytica on respiratory culture  01/14/2024  Staph epidermidis bacteremia    Plan  1. Patient remains on mechanical ventilation as he has not able to be weaned  2. Patient currently on Keppra and Vimpat.  Appreciate neurology recommendations  3. The patient's H&H stable without acute drop.  Hematemesis to likely be from Laura-Palafox tear from emesis this morning or stress gastric ulcer.  With this being said he is on home Xarelto and aspirin which we will hold. Will monitor H/H q6h. IF his Hgb remains stable overnight may consider putting back on anticogulation. GI has been consulted by emergency department.  Awaiting recommendations  4. Holding tube feeds  5. Will continue rate control with Cardizem. Lopressor for sustained HR >120. Can try to transition to oral meds tomorrow morning.   5. Continue IV vancomycin stop date 1/27/24  6. Continue Levaquin with stop date of 01/21/2024    DVT Prophylaxis: SCD  GI Prophylaxis:Protonix     32 minutes of critical care was time spent personally by me on the following activities: development of treatment plan with patient or surrogate and bedside caregivers, discussions with consultants, evaluation of patient's response to treatment, examination of patient, ordering and performing treatments and interventions, ordering and review of laboratory studies, ordering and review of radiographic studies, pulse oximetry, re-evaluation of patient's condition.  This critical care time did not overlap with that of any other provider or involve time for any procedures.     Magdi Rivera DO  Pulmonary Critical Care Medicine  [facility] - Emergency Dept  DOS: 01/17/2024

## 2024-01-17 NOTE — CONSULTS
Neurology Consult Note    Patient Name: Delio Daniel Jr.  MRN: [id number]  Admission Date: 1/17/2024  Hospital Length of Stay: 0 days  Consulting Provider: Lebron Meneses MD  Primary Care Physician: No, Primary Doctor  Principal Problem:<principal problem not specified>    Inpatient consult to Neurology  Consult performed by: Lebron Meneses MD  Consult ordered by: Lashon Sterling MD         Subjective:     Chief Complaint/HPI:    Chief Complaint   Patient presents with    Seizures     EMS reports pt. From LTAC, Craniotomy in December, Seizure-like activity today, with coffee ground emesis, Trach/Vent present. Cardene infusing at 10mg          Interim Hx since arrival:   Er staff gave ativan and the twitching has stopped       Review of Systems    Current Outpatient Medications   Medication Instructions    aspirin 81 mg, Per G Tube, Daily    atorvastatin (LIPITOR) 10 mg, Per G Tube, Daily    carvediloL (COREG) 3.125 mg, Per G Tube, 2 times daily    diltiaZEM (CARDIZEM) 240 mg, Per G Tube, Daily    guaiFENesin 100 mg/5 ml (ROBITUSSIN) 400 mg, Per G Tube, Every 8 hours    losartan (COZAAR) 50 mg, Per G Tube, Daily    polyethylene glycol (GLYCOLAX) 17 g, Per G Tube, Daily PRN    QUEtiapine (SEROQUEL) 25 mg, Per G Tube, 2 times daily         Objective:      Vitals:    01/17/24 1551   BP: (!) 166/96   Pulse: (!) 118   Resp: (!) 21   Temp:      Exam:   General Exam  if accompanied, by__ no stakeholders present   body habitus_ large gallito but not obese per se   Has hiccups   Speech __ mute    Won't open his eyes or allow me to open his eyes     Motor__ L hparesis   has tone and movements in RUE     Neuroimaging:  Ct head R craniectomy with chronic isch ch entire RMCA territory and brain tissue outside perimeter of the crani margins   ex vacuo hydrocephalus   No acute ICH or additional obv new isch in my view  report pending when I initially got on case but I just reviewed report now      Labs:  Reviewed          Assessment/Plan:   Subacute to chronic R MCA isch cva   Now with seizures     Other comments/ follow up:      Lvt 1500mg once now then q12h    levETIRAcetam   1,500 mg Intravenous Q12H   Will add vimpat 150 once then q12 if seizes again   Dw ER MD and ER nurse   Aim reassess tomorrow       MD MARISOL ReeceA

## 2024-01-17 NOTE — Clinical Note
Diagnosis: Vomiting [003713]   Future Attending Provider: PHILLIP CALIX [[id number]]   Admit to which facility:: [facility] [[id number]]   Reason for IP Medical Treatment  (Clinical interventions that can only be accomplished in the IP setting? ) :: gi bleed, seizures   I certify that Inpatient services for greater than or equal to 2 midnights are medically necessary:: Yes   Plans for Post-Acute care--if anticipated (pick the single best option):: E. LTAC Placement

## 2024-01-18 LAB
ALBUMIN SERPL-MCNC: 2.5 G/DL (ref 3.4–4.8)
ALBUMIN/GLOB SERPL: 0.7 RATIO (ref 1.1–2)
ALLENS TEST BLOOD GAS (OHS): YES
ALLENS TEST BLOOD GAS (OHS): YES
ALP SERPL-CCNC: 51 UNIT/L (ref 40–150)
ALT SERPL-CCNC: 27 UNIT/L (ref 0–55)
AST SERPL-CCNC: 30 UNIT/L (ref 5–34)
BASE EXCESS BLD CALC-SCNC: 10.6 MMOL/L (ref -2–2)
BASE EXCESS BLD CALC-SCNC: 11.8 MMOL/L (ref -2–2)
BASOPHILS # BLD AUTO: 0.03 X10(3)/MCL
BASOPHILS NFR BLD AUTO: 0.4 %
BILIRUB SERPL-MCNC: 0.6 MG/DL
BLOOD GAS SAMPLE TYPE (OHS): ABNORMAL
BLOOD GAS SAMPLE TYPE (OHS): ABNORMAL
BUN SERPL-MCNC: 12 MG/DL (ref 8.4–25.7)
CA-I BLD-SCNC: 1.08 MMOL/L (ref 1.12–1.23)
CA-I BLD-SCNC: 1.08 MMOL/L (ref 1.12–1.23)
CALCIUM SERPL-MCNC: 8.1 MG/DL (ref 8.8–10)
CHLORIDE SERPL-SCNC: 105 MMOL/L (ref 98–107)
CO2 BLDA-SCNC: 35.6 MMOL/L
CO2 BLDA-SCNC: 36 MMOL/L
CO2 SERPL-SCNC: 31 MMOL/L (ref 23–31)
COHGB MFR BLDA: 1.4 % (ref 0.5–1.5)
COHGB MFR BLDA: 1.5 % (ref 0.5–1.5)
CREAT SERPL-MCNC: 0.78 MG/DL (ref 0.73–1.18)
DRAWN BY BLOOD GAS (OHS): ABNORMAL
DRAWN BY BLOOD GAS (OHS): ABNORMAL
EOSINOPHIL # BLD AUTO: 0.2 X10(3)/MCL (ref 0–0.9)
EOSINOPHIL NFR BLD AUTO: 2.4 %
ERYTHROCYTE [DISTWIDTH] IN BLOOD BY AUTOMATED COUNT: 15.1 % (ref 11.5–17)
GFR SERPLBLD CREATININE-BSD FMLA CKD-EPI: >60 MLS/MIN/1.73/M2
GLOBULIN SER-MCNC: 3.6 GM/DL (ref 2.4–3.5)
GLUCOSE SERPL-MCNC: 159 MG/DL (ref 82–115)
HCO3 BLDA-SCNC: 34.3 MMOL/L (ref 22–26)
HCO3 BLDA-SCNC: 34.8 MMOL/L (ref 22–26)
HCT VFR BLD AUTO: 30 % (ref 42–52)
HGB BLD-MCNC: 9.4 G/DL (ref 14–18)
IMM GRANULOCYTES # BLD AUTO: 0.08 X10(3)/MCL (ref 0–0.04)
IMM GRANULOCYTES NFR BLD AUTO: 1 %
INHALED O2 CONCENTRATION: 30 %
INHALED O2 CONCENTRATION: 40 %
LYMPHOCYTES # BLD AUTO: 1.78 X10(3)/MCL (ref 0.6–4.6)
LYMPHOCYTES NFR BLD AUTO: 21.7 %
MAGNESIUM SERPL-MCNC: 1.9 MG/DL (ref 1.6–2.6)
MCH RBC QN AUTO: 28.3 PG (ref 27–31)
MCHC RBC AUTO-ENTMCNC: 31.3 G/DL (ref 33–36)
MCV RBC AUTO: 90.4 FL (ref 80–94)
MECH RR (OHS): 10 B/MIN
MECH RR (OHS): 10 B/MIN
METHGB MFR BLDA: 0.7 % (ref 0.4–1.5)
METHGB MFR BLDA: 0.8 % (ref 0.4–1.5)
MODE (OHS): ABNORMAL
MODE (OHS): ABNORMAL
MONOCYTES # BLD AUTO: 0.65 X10(3)/MCL (ref 0.1–1.3)
MONOCYTES NFR BLD AUTO: 7.9 %
NEUTROPHILS # BLD AUTO: 5.47 X10(3)/MCL (ref 2.1–9.2)
NEUTROPHILS NFR BLD AUTO: 66.6 %
NRBC BLD AUTO-RTO: 0 %
O2 HB BLOOD GAS (OHS): 93.9 % (ref 94–97)
O2 HB BLOOD GAS (OHS): 96.8 % (ref 94–97)
OXYGEN DEVICE BLOOD GAS (OHS): ABNORMAL
OXYGEN DEVICE BLOOD GAS (OHS): ABNORMAL
OXYHGB MFR BLDA: 9.4 G/DL (ref 12–16)
OXYHGB MFR BLDA: 9.4 G/DL (ref 12–16)
PCO2 BLDA: 38 MMHG (ref 35–45)
PCO2 BLDA: 41 MMHG (ref 35–45)
PEEP RESPIRATORY: 5 CMH2O
PEEP RESPIRATORY: 5 CMH2O
PH BLDA: 7.53 [PH] (ref 7.35–7.45)
PH BLDA: 7.57 [PH] (ref 7.35–7.45)
PHOSPHATE SERPL-MCNC: 2.8 MG/DL (ref 2.3–4.7)
PLATELET # BLD AUTO: 182 X10(3)/MCL (ref 130–400)
PMV BLD AUTO: 10.4 FL (ref 7.4–10.4)
PO2 BLDA: 130 MMHG (ref 80–100)
PO2 BLDA: 67 MMHG (ref 80–100)
POTASSIUM BLOOD GAS (OHS): 2.8 MMOL/L (ref 3.5–5)
POTASSIUM BLOOD GAS (OHS): 2.8 MMOL/L (ref 3.5–5)
POTASSIUM SERPL-SCNC: 3.2 MMOL/L (ref 3.5–5.1)
PROT SERPL-MCNC: 6.1 GM/DL (ref 5.8–7.6)
PS (OHS): 15 CMH2O
PS (OHS): 15 CMH2O
RBC # BLD AUTO: 3.32 X10(6)/MCL (ref 4.7–6.1)
SAMPLE SITE BLOOD GAS (OHS): ABNORMAL
SAMPLE SITE BLOOD GAS (OHS): ABNORMAL
SAO2 % BLDA: 95.6 %
SAO2 % BLDA: 99.2 %
SODIUM BLOOD GAS (OHS): 136 MMOL/L (ref 137–145)
SODIUM BLOOD GAS (OHS): 138 MMOL/L (ref 137–145)
SODIUM SERPL-SCNC: 143 MMOL/L (ref 136–145)
SPONT+MECH VT ON VENT: 460 ML
SPONT+MECH VT ON VENT: 460 ML
WBC # SPEC AUTO: 8.21 X10(3)/MCL (ref 4.5–11.5)

## 2024-01-18 PROCEDURE — 99231 SBSQ HOSP IP/OBS SF/LOW 25: CPT | Mod: ,,, | Performed by: SPECIALIST

## 2024-01-18 PROCEDURE — 99900026 HC AIRWAY MAINTENANCE (STAT)

## 2024-01-18 PROCEDURE — 27100171 HC OXYGEN HIGH FLOW UP TO 24 HOURS

## 2024-01-18 PROCEDURE — 25000003 PHARM REV CODE 250: Performed by: INTERNAL MEDICINE

## 2024-01-18 PROCEDURE — 27200966 HC CLOSED SUCTION SYSTEM

## 2024-01-18 PROCEDURE — 84100 ASSAY OF PHOSPHORUS: CPT | Performed by: INTERNAL MEDICINE

## 2024-01-18 PROCEDURE — 83735 ASSAY OF MAGNESIUM: CPT | Performed by: INTERNAL MEDICINE

## 2024-01-18 PROCEDURE — C9113 INJ PANTOPRAZOLE SODIUM, VIA: HCPCS | Performed by: INTERNAL MEDICINE

## 2024-01-18 PROCEDURE — 94003 VENT MGMT INPAT SUBQ DAY: CPT

## 2024-01-18 PROCEDURE — 82803 BLOOD GASES ANY COMBINATION: CPT

## 2024-01-18 PROCEDURE — 94761 N-INVAS EAR/PLS OXIMETRY MLT: CPT | Mod: XB

## 2024-01-18 PROCEDURE — 63600175 PHARM REV CODE 636 W HCPCS: Performed by: INTERNAL MEDICINE

## 2024-01-18 PROCEDURE — 36600 WITHDRAWAL OF ARTERIAL BLOOD: CPT

## 2024-01-18 PROCEDURE — 93005 ELECTROCARDIOGRAM TRACING: CPT

## 2024-01-18 PROCEDURE — 99900035 HC TECH TIME PER 15 MIN (STAT)

## 2024-01-18 PROCEDURE — 20000000 HC ICU ROOM

## 2024-01-18 PROCEDURE — 25000003 PHARM REV CODE 250

## 2024-01-18 PROCEDURE — 99900031 HC PATIENT EDUCATION (STAT)

## 2024-01-18 PROCEDURE — 93010 ELECTROCARDIOGRAM REPORT: CPT | Mod: ,,, | Performed by: INTERNAL MEDICINE

## 2024-01-18 PROCEDURE — 85025 COMPLETE CBC W/AUTO DIFF WBC: CPT | Performed by: INTERNAL MEDICINE

## 2024-01-18 PROCEDURE — 80053 COMPREHEN METABOLIC PANEL: CPT | Performed by: INTERNAL MEDICINE

## 2024-01-18 RX ORDER — POTASSIUM CHLORIDE 20 MEQ/1
40 TABLET, EXTENDED RELEASE ORAL ONCE
Status: DISCONTINUED | OUTPATIENT
Start: 2024-01-18 | End: 2024-01-18

## 2024-01-18 RX ORDER — PROPOFOL 10 MG/ML
0-50 INJECTION, EMULSION INTRAVENOUS CONTINUOUS
Status: DISCONTINUED | OUTPATIENT
Start: 2024-01-18 | End: 2024-01-24 | Stop reason: HOSPADM

## 2024-01-18 RX ORDER — DEXMEDETOMIDINE HYDROCHLORIDE 4 UG/ML
0-1.4 INJECTION, SOLUTION INTRAVENOUS CONTINUOUS
Status: DISCONTINUED | OUTPATIENT
Start: 2024-01-18 | End: 2024-01-24 | Stop reason: HOSPADM

## 2024-01-18 RX ADMIN — DEXMEDETOMIDINE HYDROCHLORIDE 0.4 MCG/KG/HR: 400 INJECTION INTRAVENOUS at 02:01

## 2024-01-18 RX ADMIN — PANTOPRAZOLE SODIUM 8 MG/HR: 40 INJECTION, POWDER, FOR SOLUTION INTRAVENOUS at 11:01

## 2024-01-18 RX ADMIN — LOSARTAN POTASSIUM 50 MG: 50 TABLET, FILM COATED ORAL at 09:01

## 2024-01-18 RX ADMIN — PANTOPRAZOLE SODIUM 8 MG/HR: 40 INJECTION, POWDER, FOR SOLUTION INTRAVENOUS at 07:01

## 2024-01-18 RX ADMIN — DILTIAZEM HYDROCHLORIDE 240 MG: 60 TABLET, FILM COATED ORAL at 09:01

## 2024-01-18 RX ADMIN — LEVETIRACETAM INJECTION 1500 MG: 15 INJECTION INTRAVENOUS at 09:01

## 2024-01-18 RX ADMIN — LEVOFLOXACIN 750 MG: 750 INJECTION, SOLUTION INTRAVENOUS at 06:01

## 2024-01-18 RX ADMIN — CARVEDILOL 3.12 MG: 3.12 TABLET, FILM COATED ORAL at 09:01

## 2024-01-18 RX ADMIN — VANCOMYCIN HYDROCHLORIDE 1750 MG: 500 INJECTION, POWDER, LYOPHILIZED, FOR SOLUTION INTRAVENOUS at 10:01

## 2024-01-18 RX ADMIN — CARVEDILOL 3.12 MG: 3.12 TABLET, FILM COATED ORAL at 08:01

## 2024-01-18 RX ADMIN — POTASSIUM BICARBONATE 40 MEQ: 391 TABLET, EFFERVESCENT ORAL at 01:01

## 2024-01-18 RX ADMIN — QUETIAPINE FUMARATE 25 MG: 25 TABLET ORAL at 08:01

## 2024-01-18 RX ADMIN — DEXMEDETOMIDINE HYDROCHLORIDE 1 MCG/KG/HR: 400 INJECTION INTRAVENOUS at 10:01

## 2024-01-18 RX ADMIN — LEVETIRACETAM INJECTION 1500 MG: 15 INJECTION INTRAVENOUS at 08:01

## 2024-01-18 RX ADMIN — MUPIROCIN: 20 OINTMENT TOPICAL at 09:01

## 2024-01-18 RX ADMIN — MUPIROCIN: 20 OINTMENT TOPICAL at 10:01

## 2024-01-18 RX ADMIN — PANTOPRAZOLE SODIUM 8 MG/HR: 40 INJECTION, POWDER, FOR SOLUTION INTRAVENOUS at 06:01

## 2024-01-18 RX ADMIN — PANTOPRAZOLE SODIUM 8 MG/HR: 40 INJECTION, POWDER, FOR SOLUTION INTRAVENOUS at 10:01

## 2024-01-18 RX ADMIN — ATORVASTATIN CALCIUM 10 MG: 10 TABLET, FILM COATED ORAL at 09:01

## 2024-01-18 RX ADMIN — QUETIAPINE FUMARATE 25 MG: 25 TABLET ORAL at 09:01

## 2024-01-18 RX ADMIN — POTASSIUM BICARBONATE 40 MEQ: 391 TABLET, EFFERVESCENT ORAL at 11:01

## 2024-01-18 NOTE — PLAN OF CARE
Problem: Adult Inpatient Plan of Care  Goal: Plan of Care Review  Outcome: Ongoing, Progressing  Goal: Patient-Specific Goal (Individualized)  Outcome: Ongoing, Progressing  Goal: Absence of Hospital-Acquired Illness or Injury  Outcome: Ongoing, Progressing  Goal: Optimal Comfort and Wellbeing  Outcome: Ongoing, Progressing     Problem: Skin Injury Risk Increased  Goal: Skin Health and Integrity  Outcome: Ongoing, Progressing     Problem: Infection  Goal: Absence of Infection Signs and Symptoms  Outcome: Ongoing, Progressing     Problem: Adult Inpatient Plan of Care  Goal: Readiness for Transition of Care  Outcome: Ongoing, Not Progressing

## 2024-01-18 NOTE — PROGRESS NOTES
[facility] - [facility]  Neurology  Progress Note    Patient Name: Delio Daniel Jr.  MRN: [id number]  Admission Date: 1/17/2024  Hospital Length of Stay: 1 days  Code Status: Full Code   Attending Provider: Itz Francisco MD  Primary Care Physician: Candy, Primary Doctor   Principal Problem:Seizure-like activity    HPI:   69 y/o M LTAC, Craniotomy in December, Seizure-like activity on 1/17, with coffee ground emesis, Trach/Vent present.   Pt given ativan in ED, with subsequent cessation of twitching.     CT head without on admission revealed interval worsening of cerebral edema to right cerebral hemisphere, hydrocephalus slightly worse, and significant improvement to intraparenchymal hemorrhages per radiology report.    Overview/Hospital Course:  No notes on file        Subjective:     Interval History:   Pt remains on ventilator and sedated. No further seizure like activity overnight per nursing report.     Current Neurological Medications:     Current Facility-Administered Medications   Medication Dose Route Frequency Provider Last Rate Last Admin    atorvastatin tablet 10 mg  10 mg Per G Tube Daily Magdi Rivera DO        carvediloL tablet 3.125 mg  3.125 mg Per G Tube BID Magdi Rivera DO        dexmedetomidine (PRECEDEX) 400mcg/100mL 0.9% NaCL infusion  0-1.4 mcg/kg/hr Intravenous Continuous Sony Corbin MD 11.79 mL/hr at 01/18/24 0219 0.4 mcg/kg/hr at 01/18/24 0219    diltiaZEM 125 mg in dextrose 5 % 125 mL IVPB (ready to mix) (titrating)  0-15 mg/hr Intravenous Continuous Magdi Rivera DO 10 mL/hr at 01/17/24 2220 10 mg/hr at 01/17/24 2220    diltiaZEM tablet 240 mg  240 mg Per G Tube Daily Magdi Rivera DO        hydrALAZINE injection 10 mg  10 mg Intravenous Q4H PRN Magdi Rivera DO        labetaloL injection 10 mg  10 mg Intravenous Q4H PRN Magdi Rivera DO   10 mg at 01/17/24 1824    lactated ringers infusion   Intravenous Continuous Magdi Rivera DO 50 mL/hr at  01/17/24 1833 New Bag at 01/17/24 1833    levETIRAcetam in NaCl (iso-os) IVPB 1,500 mg  1,500 mg Intravenous Q12H Magdi Rivera  mL/hr at 01/17/24 1626 1,500 mg at 01/17/24 1626    levoFLOXacin 750 mg/150 mL IVPB 750 mg  750 mg Intravenous Q24H Magdi Rivera DO   Stopped at 01/17/24 2004    losartan tablet 50 mg  50 mg Per G Tube Daily Magdi Rivera DO        metoprolol injection 5 mg  5 mg Intravenous Q5 Min PRN Magdi Rivera DO        mupirocin 2 % ointment   Nasal BID Itz Francisco MD   Given at 01/17/24 2100    pantoprazole (PROTONIX) 40 mg in sodium chloride 0.9 % 100 mL IVPB (MB+)  8 mg/hr Intravenous Continuous Magdi Rivera DO 20 mL/hr at 01/18/24 0752 8 mg/hr at 01/18/24 0752    polyethylene glycol packet 17 g  17 g Per G Tube Daily PRN Magdi Rivera DO        propofol (DIPRIVAN) 10 mg/mL infusion  0-50 mcg/kg/min (Dosing Weight) Intravenous Continuous Sony Corbin MD        QUEtiapine tablet 25 mg  25 mg Per G Tube BID Magdi Rivera DO        sodium chloride 0.9% flush 10 mL  10 mL Intravenous PRN Magdi Rivera DO        vancomycin - pharmacy to dose   Intravenous pharmacy to manage frequency Magdi Rivera DO        vancomycin 1.75 g in 5 % dextrose 500 mL IVPB  1,750 mg Intravenous Q12H Itz Francisco MD   Stopped at 01/17/24 2330     Facility-Administered Medications Ordered in Other Encounters   Medication Dose Route Frequency Provider Last Rate Last Admin    [MAR Hold - Suspended Admission] acetaminophen tablet 650 mg  650 mg Oral Q4H PRN Jl Brower FNP   650 mg at 01/17/24 0639    [MAR Hold - Suspended Admission] albuterol-ipratropium 2.5 mg-0.5 mg/3 mL nebulizer solution 3 mL  3 mL Nebulization Q4H PRN Jl Brower FNP        [MAR Hold - Suspended Admission] ALPRAZolam tablet 0.25 mg  0.25 mg Oral TID PRN Jl Brower FNP        [MAR Hold - Suspended Admission] aspirin chewable tablet 81 mg  81 mg Per G Tube Daily Leonarda  ANTONIO SanchesP        [MAR Hold - Suspended Admission] atorvastatin tablet 40 mg  40 mg Per G Tube Daily LeonardaJl sheehan, FNP        [MAR Hold - Suspended Admission] benzonatate capsule 100 mg  100 mg Oral TID PRN LeonardaJl shi, FNP        [MAR Hold - Suspended Admission] bisacodyL suppository 10 mg  10 mg Rectal Daily PRN LeonardaJl sheehan, FNP        [MAR Hold - Suspended Admission] chlorhexidine 0.12 % solution 15 mL  15 mL Mouth/Throat BID LeonardaJl shi, FNP   15 mL at 01/17/24 0900    [MAR Hold - Suspended Admission] diltiaZEM tablet 90 mg  90 mg Per G Tube Q6H Su Garcia FNP        [MAR Hold - Suspended Admission] docusate sodium capsule 100 mg  100 mg Oral BID LeonardaJl, FNP   100 mg at 01/16/24 2140    [MAR Hold - Suspended Admission] hydrALAZINE injection 10 mg  10 mg Intravenous Q4H PRN LeonardaJl sheehan, FNP   10 mg at 01/17/24 1107    [MAR Hold - Suspended Admission] hydrOXYzine pamoate capsule 50 mg  50 mg Oral Nightly PRN LeonardaJl sheehan, FNP   50 mg at 01/16/24 2141    [MAR Hold - Suspended Admission] labetaloL injection 10 mg  10 mg Intravenous Q4H PRN LeonardaJl shi, FNP   10 mg at 01/17/24 0727    [MAR Hold - Suspended Admission] levetiracetam 500 mg/5 mL (5 mL) liquid Soln 1,500 mg  1,500 mg Per G Tube BID LeonardaJl sheehan, FNP        [MAR Hold - Suspended Admission] levETIRAcetam in NaCl (iso-os) IVPB 500 mg  500 mg Intravenous Once Kevin Sellers MD        [MAR Hold - Suspended Admission] levoFLOXacin 750 mg/150 mL IVPB 750 mg  750 mg Intravenous Q24H LeonardaJl sheehan FNP        [MAR Hold - Suspended Admission] LORazepam injection 2 mg  2 mg Intravenous Q2H LeonardaJl shi, FNP        [MAR Hold - Suspended Admission] losartan tablet 50 mg  50 mg Per G Tube Daily LeonardaJl sheehan, FNP        [MAR Hold - Suspended Admission] metoprolol injection 10 mg  10 mg Intravenous Q2H PRN Jl Brower FNP   10 mg at 01/17/24  1110    [MAR Hold - Suspended Admission] metoprolol succinate (TOPROL-XL) 24 hr tablet 200 mg  200 mg Oral BID Leonarda, Jl A, FNP   200 mg at 01/16/24 2141    [MAR Hold - Suspended Admission] niCARdipine 40 mg/200 mL (0.2 mg/mL) infusion  0-15 mg/hr Intravenous Continuous Leonarda, Jl A, FNP 25 mL/hr at 01/17/24 1131 5 mg/hr at 01/17/24 1131    [MAR Hold - Suspended Admission] nitroGLYCERIN SL tablet 0.4 mg  0.4 mg Sublingual Q5 Min PRN Leonarda, Jl A, FNP        [MAR Hold - Suspended Admission] ondansetron disintegrating tablet 4 mg  4 mg Oral Q6H PRN Leonarda, Jl A, FNP        [MAR Hold - Suspended Admission] ondansetron injection 4 mg  4 mg Intravenous Q6H PRN Su Garcia FNP   4 mg at 01/17/24 0727    [MAR Hold - Suspended Admission] pantoprazole suspension 40 mg  40 mg Per G Tube Daily Leonarda, Jl A, FNP        [MAR Hold - Suspended Admission] polyethylene glycol packet 17 g  17 g Oral BID PRN Leonarda, Jl A, FNP        [MAR Hold - Suspended Admission] potassium chloride SA CR tablet 20 mEq  20 mEq Oral Daily Leonarda, Jl A, FNP        [MAR Hold - Suspended Admission] potassium chloride SA CR tablet 40 mEq  40 mEq Oral TID Leonarda, Jl A, FNP        [MAR Hold - Suspended Admission] promethazine tablet 25 mg  25 mg Oral Q6H PRN Leonarda, Jl A, FNP        [MAR Hold - Suspended Admission] rivaroxaban tablet 20 mg  20 mg Per G Tube Daily with dinner Leonarda, Jl A, FNP        [MAR Hold - Suspended Admission] spironolactone tablet 50 mg  50 mg Per G Tube Daily Leonarda, Jl A, FNP        [MAR Hold - Suspended Admission] torsemide tablet 10 mg  10 mg Per G Tube Daily Leonarda, Jl A, FNP        [MAR Hold - Suspended Admission] vancomycin (VANCOCIN) 1,750 mg in dextrose 5 % (D5W) 500 mL IVPB  1,750 mg Intravenous Q12H Kevin Sellers MD   Stopped at 01/17/24 0115    [MAR Hold - Suspended Admission] vitamin D 1000 units tablet 1,000 Units   1,000 Units Per G Tube Daily Jl Brower FNP           Review of Systems   Unable to perform ROS: Intubated     Objective:     Vital Signs (Most Recent):  Temp: 98.9 °F (37.2 °C) (01/18/24 0400)  Pulse: 81 (01/18/24 0615)  Resp: (!) 23 (01/18/24 0615)  BP: (!) 165/95 (01/18/24 0615)  SpO2: 100 % (01/18/24 0615) Vital Signs (24h Range):  Temp:  [98.7 °F (37.1 °C)-101.1 °F (38.4 °C)] 98.9 °F (37.2 °C)  Pulse:  [] 81  Resp:  [8-36] 23  SpO2:  [94 %-100 %] 100 %  BP: (126-182)/() 165/95     Weight: 117.9 kg (260 lb)  Body mass index is 37.31 kg/m².     Physical Exam  Vitals reviewed.   Constitutional:       Interventions: He is sedated and intubated.   HENT:      Head: Atraumatic.   Pulmonary:      Effort: He is intubated.          NEUROLOGICAL EXAMINATION:     MENTAL STATUS   Follows commands: not following commands.   Speech: (UTO)  Level of consciousness: unresponsive to painful stimuli    MOTOR EXAM        L hemiparesis at baseline       Significant Labs:   Recent Lab Results  (Last 5 results in the past 24 hours)        01/18/24  0605   01/18/24  0129   01/18/24  0016   01/17/24  1913   01/17/24  1256        Albumin/Globulin Ratio     0.7     0.7       Albumin     2.5     2.6       ALP     51     55       Allens Test Yes   Yes             ALT     27     28       aPTT         30.4  Comment: For Minimal Heparin Infusion, the goal aPTT 64-85 seconds corresponds to an anti-Xa of 0.3-0.5.    For Low Intensity and High Intensity Heparin, the goal aPTT  seconds corresponds to an anti-Xa of 0.3-0.7       AST     30     32       Baso #     0.03     0.05       Basophil %     0.4     0.5       BILIRUBIN TOTAL     0.6     0.5       BUN     12.0     15.1       Calcium     8.1     8.0       Calcium Level Ionized 1.08   1.08             Chloride     105     104       CO2     31     30       Creatinine     0.78     0.82       Drawn by sd rrt   smb,lrt             eGFR     >60     >60       Eos #      0.20     0.09       Eosinophil %     2.4     0.9       FIO2, Blood gas 40   30             Globulin, Total     3.6     3.8       Glucose     159     126       Hematocrit     30.0   30.9   32.0       Hemoglobin     9.4   9.5   10.0       Immature Grans (Abs)     0.08     0.12       Immature Granulocytes     1.0     1.2       INR         1.1       Lactate, Filiberto         1.0       Lymph #     1.78     1.82       LYMPH %     21.7     18.0       Magnesium      1.90           MCH     28.3     28.5       MCHC     31.3     31.3       MCV     90.4     91.2       Mec Vt 460   460             MODE SIMV   SIMV             Mono #     0.65     0.75       Mono %     7.9     7.4       MPV     10.4     10.1       Neut #     5.47     7.26       Neut %     66.6     72.0       nRBC     0.0     0.0       O2 Hb, Blood Gas 96.8   93.9             Oxygen Device, Blood gas Ventilator   Ventilator             PEEP 5.0   5.0             Phosphorus Level     2.8           Platelet Count     182     219       Base Excess, Blood gas 10.60   11.80             CO Hgb 1.4   1.5             POC HCO3 34.3   34.8             Met Hgb 0.7   0.8             POC PCO2 41.0   38.0             POC PH 7.530   7.570             POC PO2 130.0   67.0             Potassium, Blood Gas 2.8   2.8             Sodium, Blood Gas 138   136             Potassium     3.2     3.4       PROTEIN TOTAL     6.1     6.4       Protime         13.9       PS 15.0   15.0             RBC     3.32     3.51       RDW     15.1     15.0       [facility] RR 10   10             Sample site Left Radial Artery   Right Radial Artery             Sample Type Arterial Blood   Arterial Blood             sO2, Blood gas 99.2   95.6             Sodium     143     144       TOC2, Blood gas 35.6   36.0             THb, Blood gas 9.4   9.4             Vancomycin, Random       8.2         WBC     8.21     10.09                              Significant Imaging: I have reviewed all pertinent imaging  results/findings within the past 24 hours.  Assessment and Plan:     * Seizure-like activity  Continue keppra 1500 mg BID   For additional seizure-like activity, load with 150 mg Vimpat followed by 150 mg b.i.d.  Give ativan 2 mg PRN for witnessed seizures  Seizure precautions  Further recommendations per MD            VTE Risk Mitigation (From admission, onward)      None            Estefany Hussein, [facility]  Inpatient Neurology  [facility] - [facility] ICU

## 2024-01-18 NOTE — ASSESSMENT & PLAN NOTE
Continue keppra 1500 mg BID   For additional seizure-like activity, load with 150 mg Vimpat followed by 150 mg b.i.d.  Give ativan 2 mg PRN for witnessed seizures  Seizure precautions  Further recommendations per MD

## 2024-01-18 NOTE — NURSING
Nurses Note -- 4 Eyes      1/17/2024   7:10 PM      Skin assessed during: Daily Assessment      [x] No Altered Skin Integrity Present    []Prevention Measures Documented      [] Yes- Altered Skin Integrity Present or Discovered   [] LDA Added if Not in Epic (Describe Wound)   [] New Altered Skin Integrity was Present on Admit and Documented in LDA   [] Wound Image Taken    Wound Care Consulted? Yes    Attending Nurse:  Artis Johnson RN/Staff Member:  MÓNICA Dinh

## 2024-01-18 NOTE — PROGRESS NOTES
Inpatient Nutrition Assessment    Admit Date: 1/17/2024   Total duration of encounter: 1 day   Patient Age: 68 y.o.    Nutrition Recommendation/Prescription     Start tube feeding when appropriate.  Tube feeding recommendation:     Impact Peptide 1.5 goal rate 50 ml/hr + 2 packets ProSource TF20 daily to provide  1660 kcal/d (100% est needs)  134 g protein/d (85% est needs)  770 ml free water/d (33% est needs)  (calculations based on estimated 20 hr/d run time)      With free water flushes of 210 q4hr, total water: 2030kcal (86% est needs)    Communication of Recommendations: reviewed with nurse    Nutrition Assessment     Malnutrition Assessment/Nutrition-Focused Physical Exam    Malnutrition Context: acute illness or injury (01/18/24 1316)  Malnutrition Level:  (does not meet criteria) (01/18/24 1316)  Energy Intake (Malnutrition):  (does not meet criteria) (01/18/24 1316)  Weight Loss (Malnutrition):  (does not meet criteria) (01/18/24 1316)  Subcutaneous Fat (Malnutrition):  (does not meet criteria) (01/18/24 1316)           Muscle Mass (Malnutrition):  (does not meet criteria) (01/18/24 1316)                          Fluid Accumulation (Malnutrition):  (does not meet criteria) (01/18/24 1316)        A minimum of two characteristics is recommended for diagnosis of either severe or non-severe malnutrition.    Chart Review    Reason Seen: continuous nutrition monitoring, malnutrition screening tool (MST), and physician consult for TF    Malnutrition Screening Tool Results   Have you recently lost weight without trying?: Unsure  Have you been eating poorly because of a decreased appetite?: No   MST Score: 2   Diagnosis:  Seizure-like activity  Hematemesis  AFib RVR    Relevant Medical History:    Arthritis      Atrial fibrillation      BPH (benign prostatic hyperplasia)      Cardiac arrest      Coronary artery disease      Cyst, kidney, acquired      Diverticulosis      Hyperlipidemia      Hypertension      MI  (myocardial infarction)      Obesity      Steatosis of liver      Stroke        Scheduled Medications:  atorvastatin, 10 mg, Daily  carvediloL, 3.125 mg, BID  diltiaZEM, 240 mg, Daily  levETIRAcetam (Keppra) IV (PEDS and ADULTS), 1,500 mg, Q12H  levoFLOXacin, 750 mg, Q24H  losartan, 50 mg, Daily  mupirocin, , BID  potassium bicarbonate, 40 mEq, Once  QUEtiapine, 25 mg, BID  vancomycin (VANCOCIN) IV (PEDS and ADULTS), 1,750 mg, Q12H    Continuous Infusions:  dexmedeTOMIDine (Precedex) infusion (titrating), Last Rate: 0.4 mcg/kg/hr (01/18/24 0219)  dilTIAZem, Last Rate: 10 mg/hr (01/17/24 2220)  lactated ringers, Last Rate: 50 mL/hr at 01/17/24 1833  pantoprazole (PROTONIX) IV infusion, Last Rate: 8 mg/hr (01/18/24 1015)  propofoL    PRN Medications: hydrALAZINE, labetalol, metoprolol, polyethylene glycol, sodium chloride 0.9%, Pharmacy to dose Vancomycin consult **AND** vancomycin - pharmacy to dose    Calorie Containing IV Medications: no significant kcals from medications at this time    Recent Labs   Lab 01/12/24  0115 01/13/24  0205 01/14/24  0203 01/15/24  0459 01/17/24  0325 01/17/24  1256 01/17/24  1913 01/18/24  0016    141 141 142 144 144  --  143   K 3.4* 3.6 3.5 3.4* 3.2* 3.4*  --  3.2*   CALCIUM 7.8* 7.7* 8.0* 7.9* 8.7* 8.0*  --  8.1*   PHOS  --   --   --   --  2.8  --   --  2.8   MG 2.00 2.00 1.90  --  1.90  --   --  1.90   CHLORIDE 102 105 104 104 103 104  --  105   CO2 29 30 32* 28 30 30  --  31   BUN 13.3 13.9 18.0 14.0 14.5 15.1  --  12.0   CREATININE 0.78 0.69* 0.75 0.69* 0.83 0.82  --  0.78   EGFRNORACEVR >60 >60 >60 >60 >60 >60  --  >60   GLUCOSE 144* 157* 138* 148* 122* 126*  --  159*   BILITOT 0.5 0.5 0.4 0.4 0.4 0.5  --  0.6   ALKPHOS 50 48 46 49 54 55  --  51   ALT 28 27 23 23 28 28  --  27   AST 27 27 23 25 28 32  --  30   ALBUMIN 2.2* 2.0* 2.0* 2.0* 2.5* 2.6*  --  2.5*   PREALB  --   --   --   --  18.4  --   --   --    WBC 12.10* 9.80 7.67 5.44 10.43 10.09  --  8.21   HGB 10.5* 10.4*  "10.1* 10.0* 10.6* 10.0* 9.5* 9.4*   HCT 33.3* 32.1* 32.2* 31.2* 33.3* 32.0* 30.9* 30.0*     Nutrition Orders:  Diet NPO      Appetite/Oral Intake: not applicable/not applicable  Factors Affecting Nutritional Intake: altered gastrointestinal function and NPO  Food/Tenriism/Cultural Preferences: unable to obtain  Food Allergies: none reported  Last Bowel Movement: 01/17/24  Wound(s):  incision noted    Comments    1/18/24: Discussed with RN. Will provide TF recommendations for when appropriate to start TF. Can continue with previous TF recommendations (pt recently D/C'd.) Noted possible GI bleed. No kcal from meds.Noted MST, unable to verify UBW with pt. No wt loss since previous admission wts.    Anthropometrics    Height: 5' 10" (177.8 cm),    Last Weight: 117.9 kg (260 lb) (01/17/24 1219),    BMI (Calculated): 37.3  BMI Classification: obese grade II (BMI 35-39.9)        Ideal Body Weight (IBW), Male: 166 lb     % Ideal Body Weight, Male (lb): 156.63 %                          Usual Weight Provided By: unable to obtain usual weight    Wt Readings from Last 5 Encounters:   01/17/24 117.9 kg (260 lb)   01/16/24 119.6 kg (263 lb 10.7 oz)   12/29/23 119.9 kg (264 lb 5.3 oz)   12/11/23 112.7 kg (248 lb 7.3 oz)   12/05/23 112.3 kg (247 lb 9.6 oz)     Weight Change(s) Since Admission:   Wt Readings from Last 1 Encounters:   01/17/24 1219 117.9 kg (260 lb)   Admit Weight: 117.9 kg (260 lb) (01/17/24 1219),      Estimated Needs    Weight Used For Calorie Calculations: 117.9 kg (259 lb 14.8 oz)  Energy Calorie Requirements (kcal): 1300-1650kcal (11-14kcal/kg)  Energy Need Method: Kcal/kg  Weight Used For Protein Calculations: 78.2 kg (172 lb 6.4 oz) (IBW)  Protein Requirements: 157gm (2g/kg IBW)  Fluid Requirements (mL): 2358ml (20ml/kg)    Enteral Nutrition     Patient not receiving enteral nutrition at this time.    Parenteral Nutrition     Patient not receiving parenteral nutrition support at this time.    Evaluation of " Received Nutrient Intake    Calories: not meeting estimated needs  Protein: not meeting estimated needs    Patient Education     Not applicable.    Nutrition Diagnosis     PES: Inadequate oral intake related to acute illness as evidenced by trach/tube feeding since admit. (new)     Nutrition Interventions     Intervention(s): modified composition of enteral nutrition, modified rate of enteral nutrition, and collaboration with other providers    Goal: Meet greater than 80% of nutritional needs by follow-up. (new)  Goal: Tolerate enteral feeding at goal rate by follow-up. (new)    Nutrition Goals & Monitoring     Dietitian will monitor: energy intake    Nutrition Risk/Follow-Up: high (follow-up in 1-4 days)   Please consult if re-assessment needed sooner.

## 2024-01-18 NOTE — NURSING
Nurses Note -- 4 Eyes      1/18/2024   5:23 PM      Skin assessed during: Daily Assessment      [x] No Altered Skin Integrity Present    [x]Prevention Measures Documented      [] Yes- Altered Skin Integrity Present or Discovered   [] LDA Added if Not in Epic (Describe Wound)   [] New Altered Skin Integrity was Present on Admit and Documented in LDA   [] Wound Image Taken    Wound Care Consulted? No    Attending Nurse:  MÓNICA Knowles    Second RN/Staff Member:   mónica Diaz

## 2024-01-18 NOTE — SUBJECTIVE & OBJECTIVE
Subjective:     Interval History:   Pt remains on ventilator and sedated. No further seizure like activity overnight per nursing report.     Current Neurological Medications:     Current Facility-Administered Medications   Medication Dose Route Frequency Provider Last Rate Last Admin    atorvastatin tablet 10 mg  10 mg Per G Tube Daily Magdi Rivera DO        carvediloL tablet 3.125 mg  3.125 mg Per G Tube BID Magdi Rivera DO        dexmedetomidine (PRECEDEX) 400mcg/100mL 0.9% NaCL infusion  0-1.4 mcg/kg/hr Intravenous Continuous Sony Corbin MD 11.79 mL/hr at 01/18/24 0219 0.4 mcg/kg/hr at 01/18/24 0219    diltiaZEM 125 mg in dextrose 5 % 125 mL IVPB (ready to mix) (titrating)  0-15 mg/hr Intravenous Continuous Magdi Rivera DO 10 mL/hr at 01/17/24 2220 10 mg/hr at 01/17/24 2220    diltiaZEM tablet 240 mg  240 mg Per G Tube Daily Magdi Rivera DO        hydrALAZINE injection 10 mg  10 mg Intravenous Q4H PRN Magdi Rivera DO        labetaloL injection 10 mg  10 mg Intravenous Q4H PRN Magdi Rivera DO   10 mg at 01/17/24 1824    lactated ringers infusion   Intravenous Continuous Magdi Rivera DO 50 mL/hr at 01/17/24 1833 New Bag at 01/17/24 1833    levETIRAcetam in NaCl (iso-os) IVPB 1,500 mg  1,500 mg Intravenous Q12H Magdi Rivera  mL/hr at 01/17/24 1626 1,500 mg at 01/17/24 1626    levoFLOXacin 750 mg/150 mL IVPB 750 mg  750 mg Intravenous Q24H Magdi Rivera DO   Stopped at 01/17/24 2004    losartan tablet 50 mg  50 mg Per G Tube Daily Magdi Rivera DO        metoprolol injection 5 mg  5 mg Intravenous Q5 Min PRN Magdi Rivera DO        mupirocin 2 % ointment   Nasal BID Itz Francisco MD   Given at 01/17/24 2100    pantoprazole (PROTONIX) 40 mg in sodium chloride 0.9 % 100 mL IVPB (MB+)  8 mg/hr Intravenous Continuous Magdi Rivera DO 20 mL/hr at 01/18/24 0752 8 mg/hr at 01/18/24 0752    polyethylene glycol packet 17 g  17 g Per G Tube Daily PRN Magdi Rivera DO         propofol (DIPRIVAN) 10 mg/mL infusion  0-50 mcg/kg/min (Dosing Weight) Intravenous Continuous Sony Corbin MD        QUEtiapine tablet 25 mg  25 mg Per G Tube BID Magdi Rivera, DO        sodium chloride 0.9% flush 10 mL  10 mL Intravenous PRN Magdi Rivera DO        vancomycin - pharmacy to dose   Intravenous pharmacy to manage frequency Magdi Rivera DO        vancomycin 1.75 g in 5 % dextrose 500 mL IVPB  1,750 mg Intravenous Q12H Itz Francisco MD   Stopped at 01/17/24 2330     Facility-Administered Medications Ordered in Other Encounters   Medication Dose Route Frequency Provider Last Rate Last Admin    [MAR Hold - Suspended Admission] acetaminophen tablet 650 mg  650 mg Oral Q4H PRN Leonarda, Jl A, FNP   650 mg at 01/17/24 0639    [MAR Hold - Suspended Admission] albuterol-ipratropium 2.5 mg-0.5 mg/3 mL nebulizer solution 3 mL  3 mL Nebulization Q4H PRN Leonarda, Jl A, FNP        [MAR Hold - Suspended Admission] ALPRAZolam tablet 0.25 mg  0.25 mg Oral TID PRN Leonarda, Jl A, FNP        [MAR Hold - Suspended Admission] aspirin chewable tablet 81 mg  81 mg Per G Tube Daily Leonarda, Jl A, FNP        [MAR Hold - Suspended Admission] atorvastatin tablet 40 mg  40 mg Per G Tube Daily Leonarda, Jl A, FNP        [MAR Hold - Suspended Admission] benzonatate capsule 100 mg  100 mg Oral TID PRN Leonarda, Jl A, FNP        [MAR Hold - Suspended Admission] bisacodyL suppository 10 mg  10 mg Rectal Daily PRN Leonarda, Jl A, FNP        [MAR Hold - Suspended Admission] chlorhexidine 0.12 % solution 15 mL  15 mL Mouth/Throat BID Leonarda, Jl A, FNP   15 mL at 01/17/24 0900    [MAR Hold - Suspended Admission] diltiaZEM tablet 90 mg  90 mg Per G Tube Q6H Su Garcia FNP        [MAR Hold - Suspended Admission] docusate sodium capsule 100 mg  100 mg Oral BID Leonarda, Jl A, FNP   100 mg at 01/16/24 2140    [MAR Hold - Suspended Admission]  hydrALAZINE injection 10 mg  10 mg Intravenous Q4H PRN Leonarda, Jl A, FNP   10 mg at 01/17/24 1107    [MAR Hold - Suspended Admission] hydrOXYzine pamoate capsule 50 mg  50 mg Oral Nightly PRN Leonarda, Jl A, FNP   50 mg at 01/16/24 2141    [MAR Hold - Suspended Admission] labetaloL injection 10 mg  10 mg Intravenous Q4H PRN Leonarda, Jl A, FNP   10 mg at 01/17/24 0727    [MAR Hold - Suspended Admission] levetiracetam 500 mg/5 mL (5 mL) liquid Soln 1,500 mg  1,500 mg Per G Tube BID Leonarda, Jl A, FNP        [MAR Hold - Suspended Admission] levETIRAcetam in NaCl (iso-os) IVPB 500 mg  500 mg Intravenous Once Kevin Sellers MD        [MAR Hold - Suspended Admission] levoFLOXacin 750 mg/150 mL IVPB 750 mg  750 mg Intravenous Q24H Leonarda, Jl A, FNP        [MAR Hold - Suspended Admission] LORazepam injection 2 mg  2 mg Intravenous Q2H Leonarda, Jl A, FNP        [MAR Hold - Suspended Admission] losartan tablet 50 mg  50 mg Per G Tube Daily Leonarda, Jl A, FNP        [MAR Hold - Suspended Admission] metoprolol injection 10 mg  10 mg Intravenous Q2H PRN Leonarda, Jl A, FNP   10 mg at 01/17/24 1110    [MAR Hold - Suspended Admission] metoprolol succinate (TOPROL-XL) 24 hr tablet 200 mg  200 mg Oral BID Leonarda, Jl A, FNP   200 mg at 01/16/24 2141    [MAR Hold - Suspended Admission] niCARdipine 40 mg/200 mL (0.2 mg/mL) infusion  0-15 mg/hr Intravenous Continuous Leonarda, Jl A, FNP 25 mL/hr at 01/17/24 1131 5 mg/hr at 01/17/24 1131    [MAR Hold - Suspended Admission] nitroGLYCERIN SL tablet 0.4 mg  0.4 mg Sublingual Q5 Min PRN Leonarda, Jl A, FNP        [MAR Hold - Suspended Admission] ondansetron disintegrating tablet 4 mg  4 mg Oral Q6H PRN Leonarda, Jl A, FNP        [MAR Hold - Suspended Admission] ondansetron injection 4 mg  4 mg Intravenous Q6H PRN Su Garcia FNP   4 mg at 01/17/24 0727    [MAR Hold - Suspended Admission] pantoprazole  suspension 40 mg  40 mg Per G Tube Daily Jl Brower TRUMAN        [MAR Hold - Suspended Admission] polyethylene glycol packet 17 g  17 g Oral BID PRN Jl Brower FNP        [MAR Hold - Suspended Admission] potassium chloride SA CR tablet 20 mEq  20 mEq Oral Daily Jl Brower FNP        [MAR Hold - Suspended Admission] potassium chloride SA CR tablet 40 mEq  40 mEq Oral TID Jl Brower FNP        [MAR Hold - Suspended Admission] promethazine tablet 25 mg  25 mg Oral Q6H PRN Jl Brower FNP        [MAR Hold - Suspended Admission] rivaroxaban tablet 20 mg  20 mg Per G Tube Daily with dinner Jl Brower FNVANDANA        [MAR Hold - Suspended Admission] spironolactone tablet 50 mg  50 mg Per G Tube Daily Jl Brower FNP        [MAR Hold - Suspended Admission] torsemide tablet 10 mg  10 mg Per G Tube Daily Jl Brower FNVANDANA        [MAR Hold - Suspended Admission] vancomycin (VANCOCIN) 1,750 mg in dextrose 5 % (D5W) 500 mL IVPB  1,750 mg Intravenous Q12H Kevin Sellers MD   Stopped at 01/17/24 0115    [MAR Hold - Suspended Admission] vitamin D 1000 units tablet 1,000 Units  1,000 Units Per G Tube Daily Lawrence BrowerTRUMAN Segura           Review of Systems   Unable to perform ROS: Intubated     Objective:     Vital Signs (Most Recent):  Temp: 98.9 °F (37.2 °C) (01/18/24 0400)  Pulse: 81 (01/18/24 0615)  Resp: (!) 23 (01/18/24 0615)  BP: (!) 165/95 (01/18/24 0615)  SpO2: 100 % (01/18/24 0615) Vital Signs (24h Range):  Temp:  [98.7 °F (37.1 °C)-101.1 °F (38.4 °C)] 98.9 °F (37.2 °C)  Pulse:  [] 81  Resp:  [8-36] 23  SpO2:  [94 %-100 %] 100 %  BP: (126-182)/() 165/95     Weight: 117.9 kg (260 lb)  Body mass index is 37.31 kg/m².     Physical Exam  Vitals reviewed.   Constitutional:       Interventions: He is sedated and intubated.   HENT:      Head: Atraumatic.   Pulmonary:      Effort: He is intubated.          NEUROLOGICAL EXAMINATION:      MENTAL STATUS   Follows commands: not following commands.   Speech: (UTO)  Level of consciousness: unresponsive to painful stimuli    MOTOR EXAM        L hemiparesis at baseline       Significant Labs:   Recent Lab Results  (Last 5 results in the past 24 hours)        01/18/24  0605   01/18/24  0129   01/18/24  0016   01/17/24  1913   01/17/24  1256        Albumin/Globulin Ratio     0.7     0.7       Albumin     2.5     2.6       ALP     51     55       Allens Test Yes   Yes             ALT     27     28       aPTT         30.4  Comment: For Minimal Heparin Infusion, the goal aPTT 64-85 seconds corresponds to an anti-Xa of 0.3-0.5.    For Low Intensity and High Intensity Heparin, the goal aPTT  seconds corresponds to an anti-Xa of 0.3-0.7       AST     30     32       Baso #     0.03     0.05       Basophil %     0.4     0.5       BILIRUBIN TOTAL     0.6     0.5       BUN     12.0     15.1       Calcium     8.1     8.0       Calcium Level Ionized 1.08   1.08             Chloride     105     104       CO2     31     30       Creatinine     0.78     0.82       Drawn by sd rrt   smb,lrt             eGFR     >60     >60       Eos #     0.20     0.09       Eosinophil %     2.4     0.9       FIO2, Blood gas 40   30             Globulin, Total     3.6     3.8       Glucose     159     126       Hematocrit     30.0   30.9   32.0       Hemoglobin     9.4   9.5   10.0       Immature Grans (Abs)     0.08     0.12       Immature Granulocytes     1.0     1.2       INR         1.1       Lactate, Filiberto         1.0       Lymph #     1.78     1.82       LYMPH %     21.7     18.0       Magnesium      1.90           MCH     28.3     28.5       MCHC     31.3     31.3       MCV     90.4     91.2       Mech Vt 460   460             MODE SIMV   SIMV             Mono #     0.65     0.75       Mono %     7.9     7.4       MPV     10.4     10.1       Neut #     5.47     7.26       Neut %     66.6     72.0       nRBC     0.0     0.0        O2 Hb, Blood Gas 96.8   93.9             Oxygen Device, Blood gas Ventilator   Ventilator             PEEP 5.0   5.0             Phosphorus Level     2.8           Platelet Count     182     219       Base Excess, Blood gas 10.60   11.80             CO Hgb 1.4   1.5             POC HCO3 34.3   34.8             Met Hgb 0.7   0.8             POC PCO2 41.0   38.0             POC PH 7.530   7.570             POC PO2 130.0   67.0             Potassium, Blood Gas 2.8   2.8             Sodium, Blood Gas 138   136             Potassium     3.2     3.4       PROTEIN TOTAL     6.1     6.4       Protime         13.9       PS 15.0   15.0             RBC     3.32     3.51       RDW     15.1     15.0       Mech RR 10   10             Sample site Left Radial Artery   Right Radial Artery             Sample Type Arterial Blood   Arterial Blood             sO2, Blood gas 99.2   95.6             Sodium     143     144       TOC2, Blood gas 35.6   36.0             THb, Blood gas 9.4   9.4             Vancomycin, Random       8.2         WBC     8.21     10.09                              Significant Imaging: I have reviewed all pertinent imaging results/findings within the past 24 hours.

## 2024-01-18 NOTE — PROGRESS NOTES
Pharmacokinetic Initial Assessment: IV Vancomycin    Assessment/Plan:    Continue previous vancomycin dose of  1750 mg q12h.  Desired empiric serum trough concentration is 15 to 20 mcg/mL  Draw vancomycin trough on 1/19 at 0800.  Pharmacy will continue to follow and monitor vancomycin.       Patient brief summary:  Delio Daniel Jr. is a 68 y.o. male initiated on antimicrobial therapy with IV Vancomycin for treatment of suspected bacteremia    Drug Allergies:   Review of patient's allergies indicates:  No Known Allergies    Actual Body Weight:   117.9 kg    Renal Function:   Estimated Creatinine Clearance: 112.5 mL/min (based on SCr of 0.82 mg/dL).,     Dialysis Method (if applicable):  N/A    CBC (last 72 hours):  Recent Labs   Lab Result Units 01/15/24  0459 01/17/24  0325 01/17/24  1256 01/17/24  1913   WBC x10(3)/mcL 5.44 10.43 10.09  --    Hgb g/dL 10.0* 10.6* 10.0* 9.5*   Hct % 31.2* 33.3* 32.0* 30.9*   Platelet x10(3)/mcL 197 259 219  --    Mono % % 9.0 8.8 7.4  --    Eos % % 8.8 1.7 0.9  --    Basophil % % 1.1 0.6 0.5  --        Metabolic Panel (last 72 hours):  Recent Labs   Lab Result Units 01/15/24  0459 01/17/24  0325 01/17/24  1243 01/17/24  1256   Sodium Level mmol/L 142 144  --  144   Potassium Level mmol/L 3.4* 3.2*  --  3.4*   Chloride mmol/L 104 103  --  104   Carbon Dioxide mmol/L 28 30  --  30   Glucose Level mg/dL 148* 122*  --  126*   Glucose, UA   --  Negative Normal  --    Blood Urea Nitrogen mg/dL 14.0 14.5  --  15.1   Creatinine mg/dL 0.69* 0.83  --  0.82   Albumin Level g/dL 2.0* 2.5*  --  2.6*   Bilirubin Total mg/dL 0.4 0.4  --  0.5   Alkaline Phosphatase unit/L 49 54  --  55   Aspartate Aminotransferase unit/L 25 28  --  32   Alanine Aminotransferase unit/L 23 28  --  28   Magnesium Level mg/dL  --  1.90  --   --    Phosphorus Level mg/dL  --  2.8  --   --        Drug levels (last 3 results):  Recent Labs   Lab Result Units 01/15/24  1959 01/17/24  1913   Vanc Lvl Random ug/ml  --  8.2*    Vancomycin Trough ug/ml 21.2*  --        Microbiologic Results:  Microbiology Results (last 7 days)       Procedure Component Value Units Date/Time    Blood culture #1 **CANNOT BE ORDERED STAT** [[id number]] Collected: 01/17/24 1256    Order Status: Resulted Specimen: Blood Updated: 01/17/24 1337    Blood culture #2 **CANNOT BE ORDERED STAT** [[id number]] Collected: 01/17/24 1256    Order Status: Resulted Specimen: Blood Updated: 01/17/24 1307

## 2024-01-18 NOTE — PLAN OF CARE
01/18/24 1355   Discharge Assessment   Assessment Type Discharge Planning Assessment   Confirmed/corrected address, phone number and insurance Yes  (patient's address is [address] [place] [address])   Confirmed Demographics Contacted registration to update  (spoke to Concepcion)   Source of Information family  (spoke to daughter, Tony Daniel)   If unable to respond/provide information was family/caregiver contacted? Yes   Contact Name/Number Tony Daniel [phone]   When was your last doctors appointment? 12/11/23   Communicated EDER with patient/caregiver Date not available/Unable to determine   Reason For Admission seizure activity   People in Home child(km), adult   Facility Arrived From: [facility]   Do you expect to return to your current living situation? Yes   Do you have help at home or someone to help you manage your care at home? Yes   Who are your caregiver(s) and their phone number(s)? daughterBeth (patient lives with daughter) [phone]   Prior to hospitilization cognitive status: Unable to Assess   Current cognitive status: Coma/Sedated/Intubated   Walking or Climbing Stairs Difficulty yes   Walking or Climbing Stairs ambulation difficulty, requires equipment   Mobility Management cane   Dressing/Bathing Difficulty yes   Dressing/Bathing bathing difficulty, assistance 1 person   Dressing/Bathing Management assistance with bathing   Equipment Currently Used at Home cane, straight   Do you currently have service(s) that help you manage your care at home? No   Do you take prescription medications? Yes   Do you have prescription coverage? Yes   Coverage [employer]   Do you have any problems affording any of your prescribed medications? No   Is the patient taking medications as prescribed? yes   Who is going to help you get home at discharge? daughters   How do you get to doctors appointments? health plan transportation   Are you on dialysis? No   Do you take coumadin? No    Discharge Plan A Long-term acute care facility (LTAC)   Discharge Plan B Skilled Nursing Facility   DME Needed Upon Discharge    (to be determined)   Transition of Care Barriers None   OTHER   Name(s) of People in Home daughter, Beth Daniel     Sent message for missing IMM to patient access.

## 2024-01-18 NOTE — HPI
69 y/o M LTAC, Craniotomy in December, Seizure-like activity on 1/17, with coffee ground emesis, Trach/Vent present.   Pt given ativan in ED, with subsequent cessation of twitching.     CT head without on admission revealed interval worsening of cerebral edema to right cerebral hemisphere, hydrocephalus slightly worse, and significant improvement to intraparenchymal hemorrhages per radiology report.

## 2024-01-18 NOTE — CONSULTS
Gastroenterology Consult  History reviewed.  Patient independently seen and examined by me.  This 68-year-old male with a history of a stroke status post thrombectomy, craniectomy hemorrhagic conversion who is status post recent PEG placement a couple weeks ago.  The patient is readmitted with vomiting and apparently had a small amount of coffee-ground emesis.  He had been tolerating tube feedings well prior to that.  CT scan of the brain performed the worsening of his cerebral edema physical exam the patient is aphasic.  He has in no acute distress.  Peg feedings are on hold.  Had it is possible with the vomiting and due to increased intracranial pressure.  No significant GI bleeding at present with a recent negative EGD.  Plan is IV PPI and we will monitor his clinical course do not think endoscopy will be necessary.  Reason for Consult:     Coffee ground emesis     HPI:  Delio Daniel Jr. is a 68 y.o. male w/ pmhx of Dr. Marielos Day (known to Dr. 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  Hospital course complicated by superficial thrombus and left cephalic vein, AFib RVR, acute hypoxemic respiratory failure requiring intubation.  GI was consulted for PEG placement. EGD/PEG procedure was completed 1/2/24, revealing relatively unremarkable exam, and a 24 FR peg was placed.     The patient was discharged to [facility] 1/16/24 and transferred back to [facility] yesterday 1/17/24 for seizurelike activity and reported coffee ground emesis. He has minimal neuro recovery since his CVA, neuro is following and adjusting abx given seizure side effects. ICU currently not suspecting a large  volume GI bleed, but GI consulted to joe.     Hgb was 9-10 g/dl since admission at the end of December. After readmission yesterday hgb has been 9.5 and 9.4 g/dl. His BP has been stable to high, in the 160s over 90s. This morning /107. He had been on xarelto and asa which are currently held since yesterday's events.     A CT abd/pelv w/ iv contrast yesterday 1/17/24 reveals mild inflammation of RLQ mesentery with adjacent lymph nodes, possibly mesenteric adenitis, cystitis, and LLL atelectasis with pleural effusion    Since admission there have been no reports of vomiting or GI bleeding. PEG currently hooked to gravity without evidence of bleeding. Stool is brown    PCP:  No, Primary Doctor    Review of patient's allergies indicates:  No Known Allergies    Current Facility-Administered Medications   Medication Dose Route Frequency Provider Last Rate Last Admin    atorvastatin tablet 10 mg  10 mg Per G Tube Daily Magdi Rivera DO   10 mg at 01/18/24 0918    carvediloL tablet 3.125 mg  3.125 mg Per G Tube BID Magdi Rivera DO   3.125 mg at 01/18/24 0918    dexmedetomidine (PRECEDEX) 400mcg/100mL 0.9% NaCL infusion  0-1.4 mcg/kg/hr Intravenous Continuous Sony Corbin MD 11.79 mL/hr at 01/18/24 0219 0.4 mcg/kg/hr at 01/18/24 0219    diltiaZEM 125 mg in dextrose 5 % 125 mL IVPB (ready to mix) (titrating)  0-15 mg/hr Intravenous Continuous Magdi Rivera DO 10 mL/hr at 01/17/24 2220 10 mg/hr at 01/17/24 2220    diltiaZEM tablet 240 mg  240 mg Per G Tube Daily Magdi Rivera DO   240 mg at 01/18/24 0917    hydrALAZINE injection 10 mg  10 mg Intravenous Q4H PRN Magdi Rivera DO        labetaloL injection 10 mg  10 mg Intravenous Q4H PRN Magdi Rivera DO   10 mg at 01/17/24 1824    lactated ringers infusion   Intravenous Continuous Magdi Rivera DO 50 mL/hr at 01/17/24 1833 New Bag at 01/17/24 1833    levETIRAcetam in NaCl (iso-os) IVPB 1,500 mg  1,500 mg Intravenous Q12H Magdi Rivera,   mL/hr at 01/18/24 0921 1,500 mg at 01/18/24 0921    levoFLOXacin 750 mg/150 mL IVPB 750 mg  750 mg Intravenous Q24H Magdi Rivera DO   Stopped at 01/17/24 2004    losartan tablet 50 mg  50 mg Per G Tube Daily Magdi Rivera DO   50 mg at 01/18/24 0918    metoprolol injection 5 mg  5 mg Intravenous Q5 Min PRN Magdi Rivera DO        mupirocin 2 % ointment   Nasal BID Itz Francisco MD   Given at 01/18/24 0919    pantoprazole (PROTONIX) 40 mg in sodium chloride 0.9 % 100 mL IVPB (MB+)  8 mg/hr Intravenous Continuous Magdi Rivera DO 20 mL/hr at 01/18/24 0752 8 mg/hr at 01/18/24 0752    polyethylene glycol packet 17 g  17 g Per G Tube Daily PRN Magdi Rivera DO        propofol (DIPRIVAN) 10 mg/mL infusion  0-50 mcg/kg/min (Dosing Weight) Intravenous Continuous Sony Corbin MD        QUEtiapine tablet 25 mg  25 mg Per G Tube BID Magdi Rivera DO   25 mg at 01/18/24 0918    sodium chloride 0.9% flush 10 mL  10 mL Intravenous PRN Magdi Rivera DO        vancomycin - pharmacy to dose   Intravenous pharmacy to manage frequency Magdi Rivera DO        vancomycin 1.75 g in 5 % dextrose 500 mL IVPB  1,750 mg Intravenous Q12H Itz Francisco MD   Stopped at 01/17/24 2330     Facility-Administered Medications Ordered in Other Encounters   Medication Dose Route Frequency Provider Last Rate Last Admin    [MAR Hold - Suspended Admission] acetaminophen tablet 650 mg  650 mg Oral Q4H PRN Jl Brower FNP   650 mg at 01/17/24 0639    [MAR Hold - Suspended Admission] albuterol-ipratropium 2.5 mg-0.5 mg/3 mL nebulizer solution 3 mL  3 mL Nebulization Q4H PRN Jl Brower FNP        [MAR Hold - Suspended Admission] ALPRAZolam tablet 0.25 mg  0.25 mg Oral TID PRN Jl Brower FNP        [MAR Hold - Suspended Admission] aspirin chewable tablet 81 mg  81 mg Per G Tube Daily Jl Brower FNP        [MAR Hold - Suspended Admission] atorvastatin tablet 40 mg  40 mg Per G Tube  Daily Leonarda, Jl A, FNP        [MAR Hold - Suspended Admission] benzonatate capsule 100 mg  100 mg Oral TID PRN Leonarda, Jl A, FNP        [MAR Hold - Suspended Admission] bisacodyL suppository 10 mg  10 mg Rectal Daily PRN Leonarda, Jl A, FNP        [MAR Hold - Suspended Admission] chlorhexidine 0.12 % solution 15 mL  15 mL Mouth/Throat BID Leonarda, Jl A, FNP   15 mL at 01/17/24 0900    [MAR Hold - Suspended Admission] diltiaZEM tablet 90 mg  90 mg Per G Tube Q6H Su Garcia, FNP        [MAR Hold - Suspended Admission] docusate sodium capsule 100 mg  100 mg Oral BID Leonarda, Jl A, FNP   100 mg at 01/16/24 2140    [MAR Hold - Suspended Admission] hydrALAZINE injection 10 mg  10 mg Intravenous Q4H PRN Leonarda, Jl A, FNP   10 mg at 01/17/24 1107    [MAR Hold - Suspended Admission] hydrOXYzine pamoate capsule 50 mg  50 mg Oral Nightly PRN Leonarda, Jl A, FNP   50 mg at 01/16/24 2141    [MAR Hold - Suspended Admission] labetaloL injection 10 mg  10 mg Intravenous Q4H PRN Leonarda, Jl A, FNP   10 mg at 01/17/24 0727    [MAR Hold - Suspended Admission] levetiracetam 500 mg/5 mL (5 mL) liquid Soln 1,500 mg  1,500 mg Per G Tube BID Leonarda, Jl A, FNP        [MAR Hold - Suspended Admission] levETIRAcetam in NaCl (iso-os) IVPB 500 mg  500 mg Intravenous Once Kevin Sellers MD        [MAR Hold - Suspended Admission] levoFLOXacin 750 mg/150 mL IVPB 750 mg  750 mg Intravenous Q24H Leonarda, Jl A, FNP        [MAR Hold - Suspended Admission] LORazepam injection 2 mg  2 mg Intravenous Q2H Leonarda, Jl A, FNP        [MAR Hold - Suspended Admission] losartan tablet 50 mg  50 mg Per G Tube Daily Leonarda, Jl A, FNP        [MAR Hold - Suspended Admission] metoprolol injection 10 mg  10 mg Intravenous Q2H PRN Leonarda, Jl A, FNP   10 mg at 01/17/24 1110    [MAR Hold - Suspended Admission] metoprolol succinate (TOPROL-XL) 24 hr tablet 200 mg  200 mg  Oral BID Lawrence Browervanessa PUENTE, FNP   200 mg at 01/16/24 2141    [MAR Hold - Suspended Admission] niCARdipine 40 mg/200 mL (0.2 mg/mL) infusion  0-15 mg/hr Intravenous Continuous Jl Brower, FNP 25 mL/hr at 01/17/24 1131 5 mg/hr at 01/17/24 1131    [MAR Hold - Suspended Admission] nitroGLYCERIN SL tablet 0.4 mg  0.4 mg Sublingual Q5 Min PRN Leonarda Jl A, FNP        [MAR Hold - Suspended Admission] ondansetron disintegrating tablet 4 mg  4 mg Oral Q6H PRN LeonardaJl shi A, FNP        [MAR Hold - Suspended Admission] ondansetron injection 4 mg  4 mg Intravenous Q6H PRN Su Garcia FNP   4 mg at 01/17/24 0727    [MAR Hold - Suspended Admission] pantoprazole suspension 40 mg  40 mg Per G Tube Daily LeonardaJl sheehan, FNP        [MAR Hold - Suspended Admission] polyethylene glycol packet 17 g  17 g Oral BID PRN Leonarda, Jl A, FNP        [MAR Hold - Suspended Admission] potassium chloride SA CR tablet 20 mEq  20 mEq Oral Daily Leonarda, Jl A, FNP        [MAR Hold - Suspended Admission] potassium chloride SA CR tablet 40 mEq  40 mEq Oral TID Leonarda Jl A, FNP        [MAR Hold - Suspended Admission] promethazine tablet 25 mg  25 mg Oral Q6H PRN LeonardaJl A, FNP        [MAR Hold - Suspended Admission] rivaroxaban tablet 20 mg  20 mg Per G Tube Daily with dinner LeonardaJl sheehan A, FNP        [MAR Hold - Suspended Admission] spironolactone tablet 50 mg  50 mg Per G Tube Daily Leonarda, Jl A, FNP        [MAR Hold - Suspended Admission] torsemide tablet 10 mg  10 mg Per G Tube Daily Leonarda, Jl A, FNP        [MAR Hold - Suspended Admission] vancomycin (VANCOCIN) 1,750 mg in dextrose 5 % (D5W) 500 mL IVPB  1,750 mg Intravenous Q12H Kevin Sellers MD   Stopped at 01/17/24 0115    [MAR Hold - Suspended Admission] vitamin D 1000 units tablet 1,000 Units  1,000 Units Per G Tube Daily Jl Brower, FNP         Medications Prior to Admission    Medication Sig Dispense Refill Last Dose    aspirin 81 MG Chew 1 tablet (81 mg total) by Per G Tube route once daily. 30 tablet 11     atorvastatin (LIPITOR) 10 MG tablet 1 tablet (10 mg total) by Per G Tube route once daily. 90 tablet 3     carvediloL (COREG) 3.125 MG tablet 1 tablet (3.125 mg total) by Per G Tube route 2 (two) times daily. 60 tablet 11     diltiaZEM (CARDIZEM) 120 MG tablet 2 tablets (240 mg total) by Per G Tube route once daily. 60 tablet 11     guaiFENesin 100 mg/5 ml (ROBITUSSIN) 100 mg/5 mL syrup 20 mLs (400 mg total) by Per G Tube route every 8 (eight) hours. for 10 days 473 mL 0     losartan (COZAAR) 50 MG tablet 1 tablet (50 mg total) by Per G Tube route once daily. 90 tablet 3     polyethylene glycol (GLYCOLAX) 17 gram PwPk 17 g by Per G Tube route daily as needed for Constipation. 20 each 0     QUEtiapine (SEROQUEL) 25 MG Tab 1 tablet (25 mg total) by Per G Tube route 2 (two) times daily. 60 tablet 11        Past Medical History:  Past Medical History:   Diagnosis Date    Arthritis     Atrial fibrillation     BPH (benign prostatic hyperplasia)     Cardiac arrest     Coronary artery disease     Cyst, kidney, acquired     Diverticulosis     Hyperlipidemia     Hypertension     MI (myocardial infarction)     Obesity     Steatosis of liver     Stroke        Past Surgical History:  Past Surgical History:   Procedure Laterality Date    A-V CARDIAC PACEMAKER INSERTION Right     CARDIAC CATHETERIZATION      COLONOSCOPY W/ BIOPSIES      CRANIECTOMY Right 12/20/2023    Procedure: CRANIECTOMY;  Surgeon: Artem Can MD;  Location: [facility] OR;  Service: Neurosurgery;  Laterality: Right;    ESOPHAGOGASTRODUODENOSCOPY W/ PEG N/A 1/2/2024    Procedure: PEG;  Surgeon: Tani Day MD;  Location: [facility] ENDOSCOPY;  Service: Gastroenterology;  Laterality: N/A;    excision of colon      TRACHEOSTOMY N/A 12/29/2023    Procedure: CREATION, TRACHEOSTOMY;  Surgeon: Patricia Winslow MD;  Location: [facility]  OR;  Service: ENT;  Laterality: N/A;  REQ 1130 //  NEEDS 2 SCRUBS       Family History:  Family History   Problem Relation Age of Onset    Hypertension Mother     Hypertension Father     Hypertension Sister        Social History:  Social History     Tobacco Use    Smoking status: Never    Smokeless tobacco: Never   Substance Use Topics    Alcohol use: Not Currently           Review of Systems:    Review of Systems   Unable to perform ROS: Patient nonverbal       Objective:      VITAL SIGNS: 24 HR MIN & MAX LAST  Temp  Min: 98.7 °F (37.1 °C)  Max: 101.1 °F (38.4 °C) 98.9 °F (37.2 °C)  BP  Min: 126/82  Max: 177/85 (!) 160/107  Pulse  Min: 81  Max: 140 86  Resp  Min: 8  Max: 36 (!) 23  SpO2  Min: 94 %  Max: 100 % 99 %      Intake/Output Summary (Last 24 hours) at 1/18/2024 0944  Last data filed at 1/18/2024 0801  Gross per 24 hour   Intake 1385.9 ml   Output 2105 ml   Net -719.1 ml       Physical Exam  Vitals and nursing note reviewed.   Constitutional:       Appearance: He is obese. He is ill-appearing.   HENT:      Head: Normocephalic and atraumatic.   Eyes:      General: No scleral icterus.     Extraocular Movements: Extraocular movements intact.   Cardiovascular:      Rate and Rhythm: Normal rate and regular rhythm.      Heart sounds: Normal heart sounds.   Pulmonary:      Effort: Respiratory distress present.      Comments: Trach in place on vent  Abdominal:      General: Abdomen is flat. Bowel sounds are normal. There is no distension.      Palpations: Abdomen is soft.      Tenderness: There is no abdominal tenderness.      Comments: PEG site c/d/I. Tube hooked to gravity with clear/light brown output in bag. No signs of blood around or in tubing. Rectal tube output is liquid brown, non melenic, nonbloody    Genitourinary:     Comments: underwood  Musculoskeletal:         General: No swelling or deformity. Normal range of motion.      Right lower leg: No edema.      Left lower leg: No edema.   Skin:     General: Skin  is warm and dry.   Neurological:      Comments: unresponsive         Recent Results (from the past 48 hour(s))   Comprehensive metabolic panel    Collection Time: 01/17/24  3:25 AM   Result Value Ref Range    Sodium Level 144 136 - 145 mmol/L    Potassium Level 3.2 (L) 3.5 - 5.1 mmol/L    Chloride 103 98 - 107 mmol/L    Carbon Dioxide 30 23 - 31 mmol/L    Glucose Level 122 (H) 82 - 115 mg/dL    Blood Urea Nitrogen 14.5 8.4 - 25.7 mg/dL    Creatinine 0.83 0.73 - 1.18 mg/dL    Calcium Level Total 8.7 (L) 8.8 - 10.0 mg/dL    Protein Total 6.8 5.8 - 7.6 gm/dL    Albumin Level 2.5 (L) 3.4 - 4.8 g/dL    Globulin 4.3 (H) 2.4 - 3.5 gm/dL    Albumin/Globulin Ratio 0.6 (L) 1.1 - 2.0 ratio    Bilirubin Total 0.4 <=1.5 mg/dL    Alkaline Phosphatase 54 40 - 150 unit/L    Alanine Aminotransferase 28 0 - 55 unit/L    Aspartate Aminotransferase 28 5 - 34 unit/L    eGFR >60 mls/min/1.73/m2   Magnesium    Collection Time: 01/17/24  3:25 AM   Result Value Ref Range    Magnesium Level 1.90 1.60 - 2.60 mg/dL   Phosphorus    Collection Time: 01/17/24  3:25 AM   Result Value Ref Range    Phosphorus Level 2.8 2.3 - 4.7 mg/dL   Prealbumin    Collection Time: 01/17/24  3:25 AM   Result Value Ref Range    Prealbumin 18.4 16.0 - 42.0 mg/dL   Ferritin    Collection Time: 01/17/24  3:25 AM   Result Value Ref Range    Ferritin Level 241.52 21.81 - 274.66 ng/mL   Iron and TIBC    Collection Time: 01/17/24  3:25 AM   Result Value Ref Range    Iron Binding Capacity Unsaturated 194 69 - 240 ug/dL    Iron Level 32 (L) 65 - 175 ug/dL    Transferrin 213 163 - 344 mg/dL    Iron Binding Capacity Total 226 (L) 250 - 450 ug/dL    Iron Saturation 14 (L) 20 - 50 %   Blood Culture    Collection Time: 01/17/24  3:25 AM    Specimen: PICC Line; Blood   Result Value Ref Range    CULTURE, BLOOD ([facility]) No Growth At 24 Hours    Blood Culture    Collection Time: 01/17/24  3:25 AM    Specimen: Hand, Left; Blood   Result Value Ref Range    CULTURE, BLOOD ([facility]) No Growth  At 24 Hours    Urinalysis, Reflex to Urine Culture Urine, Catheterized    Collection Time: 01/17/24  3:25 AM    Specimen: Urine   Result Value Ref Range    Color, UA Yellow Yellow, Light-Yellow, Dark Yellow, Manisha, Straw    Appearance, UA Cloudy (A) Clear    Specific Gravity, UA 1.025 1.005 - 1.030    pH, UA 6.5 5.0 - 8.5    Protein,  (A) Negative    Glucose, UA Negative Negative, Normal    Ketones, UA Negative Negative    Blood, UA Large (A) Negative    Bilirubin, UA Negative Negative    Urobilinogen, UA 1.0 0.2, 1.0, Normal    Nitrites, UA Negative Negative    Leukocyte Esterase, UA Trace (A) Negative   CBC with Differential    Collection Time: 01/17/24  3:25 AM   Result Value Ref Range    WBC 10.43 4.50 - 11.50 x10(3)/mcL    RBC 3.71 (L) 4.70 - 6.10 x10(6)/mcL    Hgb 10.6 (L) 14.0 - 18.0 g/dL    Hct 33.3 (L) 42.0 - 52.0 %    MCV 89.8 80.0 - 94.0 fL    MCH 28.6 27.0 - 31.0 pg    MCHC 31.8 (L) 33.0 - 36.0 g/dL    RDW 14.6 11.5 - 17.0 %    Platelet 259 130 - 400 x10(3)/mcL    MPV 11.2 (H) 7.4 - 10.4 fL    Neut % 65.4 %    Lymph % 21.9 %    Mono % 8.8 %    Eos % 1.7 %    Basophil % 0.6 %    Lymph # 2.28 0.6 - 4.6 x10(3)/mcL    Neut # 6.82 2.1 - 9.2 x10(3)/mcL    Mono # 0.92 0.1 - 1.3 x10(3)/mcL    Eos # 0.18 0 - 0.9 x10(3)/mcL    Baso # 0.06 <=0.2 x10(3)/mcL    IG# 0.17 (H) 0 - 0.04 x10(3)/mcL    IG% 1.6 %    NRBC% 0.2 %   Urinalysis, Microscopic    Collection Time: 01/17/24  3:25 AM   Result Value Ref Range    Bacteria, UA None Seen None Seen, Rare, Occasional /HPF    Fine Granular Casts, UA Moderate (A) None Seen /LPF    RBC, UA >100 (A) None Seen, 0-2, 3-5, 0-5 /HPF    WBC, UA 6-10 (A) None Seen, 0-2, 3-5, 0-5 /HPF    Squamous Epithelial Cells, UA Rare None Seen, Rare, Occasional, Occ /HPF   ISTAT PROCEDURE    Collection Time: 01/17/24  5:17 AM   Result Value Ref Range    POC PH 7.518 (H) 7.35 - 7.45    POC PCO2 40.1 35 - 45 mmHg    POC PO2 99 80 - 100 mmHg    POC HCO3 32.6 (H) 24 - 28 mmol/L    POC BE 10  (H) -2 to 2 mmol/L    POC SATURATED O2 98 95 - 100 %    POC TCO2 34 (H) 23 - 27 mmol/L    Sample ARTERIAL    POCT ARTERIAL BLOOD GAS Blood Gas    Collection Time: 01/17/24  5:33 AM   Result Value Ref Range    POC PH 7.518     POC PCO2 40.1     POC PO2 99     POC Sodium      POC Potassium      POC Ionized Calcium      POC HEMOGLOBIN      POC O2Hb      POC COHb      POC MetHb      POC PCO2 40.1     Base Excess, Arterial 10.0 (A) -2.0 - 2.0 mmol/L    O2 Sat, Art 98     HCO3, Arterial 32.6 (A) 18.0 - 23.0 MMOL/L    POC FIO2 30    COVID/RSV/FLU A&B PCR    Collection Time: 01/17/24 12:43 PM   Result Value Ref Range    Influenza A PCR Not Detected Not Detected    Influenza B PCR Not Detected Not Detected    Respiratory Syncytial Virus PCR Not Detected Not Detected    SARS-CoV-2 PCR Not Detected Not Detected, Negative   Urinalysis, Reflex to Urine Culture    Collection Time: 01/17/24 12:43 PM    Specimen: Urine   Result Value Ref Range    Color, UA Yellow Yellow, Light-Yellow, Colorless, Straw, Dark-Yellow    Appearance, UA Turbid (A) Clear    Specific Gravity, UA 1.020 1.005 - 1.030    pH, UA 7.5 5.0 - 8.5    Protein, UA 1+ (A) Negative    Glucose, UA Normal Negative, Normal    Ketones, UA Negative Negative    Blood, UA 2+ (A) Negative    Bilirubin, UA Negative Negative    Urobilinogen, UA Normal 0.2, 1.0, Normal    Nitrites, UA Negative Negative    Leukocyte Esterase, UA Negative Negative    WBC, UA 0-5 None Seen, 0-2, 3-5, 0-5 /HPF    Bacteria, UA Occasional (A) None Seen, Trace /HPF    Squamous Epithelial Cells, UA None Seen None Seen /HPF    Mucous, UA Trace (A) None Seen /LPF    Amorphous Crystal, UA Occasional (A) None Seen /uL    RBC, UA 50-99 (A) None Seen, 0-2, 3-5, 0-5 /HPF   Comprehensive metabolic panel    Collection Time: 01/17/24 12:56 PM   Result Value Ref Range    Sodium Level 144 136 - 145 mmol/L    Potassium Level 3.4 (L) 3.5 - 5.1 mmol/L    Chloride 104 98 - 107 mmol/L    Carbon Dioxide 30 23 - 31 mmol/L     Glucose Level 126 (H) 82 - 115 mg/dL    Blood Urea Nitrogen 15.1 8.4 - 25.7 mg/dL    Creatinine 0.82 0.73 - 1.18 mg/dL    Calcium Level Total 8.0 (L) 8.8 - 10.0 mg/dL    Protein Total 6.4 5.8 - 7.6 gm/dL    Albumin Level 2.6 (L) 3.4 - 4.8 g/dL    Globulin 3.8 (H) 2.4 - 3.5 gm/dL    Albumin/Globulin Ratio 0.7 (L) 1.1 - 2.0 ratio    Bilirubin Total 0.5 <=1.5 mg/dL    Alkaline Phosphatase 55 40 - 150 unit/L    Alanine Aminotransferase 28 0 - 55 unit/L    Aspartate Aminotransferase 32 5 - 34 unit/L    eGFR >60 mls/min/1.73/m2   APTT    Collection Time: 01/17/24 12:56 PM   Result Value Ref Range    PTT 30.4 23.2 - 33.7 seconds   Lactic acid, plasma    Collection Time: 01/17/24 12:56 PM   Result Value Ref Range    Lactic Acid Level 1.0 0.5 - 2.2 mmol/L   Protime-INR    Collection Time: 01/17/24 12:56 PM   Result Value Ref Range    PT 13.9 12.5 - 14.5 seconds    INR 1.1 <=1.3   CBC with Differential    Collection Time: 01/17/24 12:56 PM   Result Value Ref Range    WBC 10.09 4.50 - 11.50 x10(3)/mcL    RBC 3.51 (L) 4.70 - 6.10 x10(6)/mcL    Hgb 10.0 (L) 14.0 - 18.0 g/dL    Hct 32.0 (L) 42.0 - 52.0 %    MCV 91.2 80.0 - 94.0 fL    MCH 28.5 27.0 - 31.0 pg    MCHC 31.3 (L) 33.0 - 36.0 g/dL    RDW 15.0 11.5 - 17.0 %    Platelet 219 130 - 400 x10(3)/mcL    MPV 10.1 7.4 - 10.4 fL    Neut % 72.0 %    Lymph % 18.0 %    Mono % 7.4 %    Eos % 0.9 %    Basophil % 0.5 %    Lymph # 1.82 0.6 - 4.6 x10(3)/mcL    Neut # 7.26 2.1 - 9.2 x10(3)/mcL    Mono # 0.75 0.1 - 1.3 x10(3)/mcL    Eos # 0.09 0 - 0.9 x10(3)/mcL    Baso # 0.05 <=0.2 x10(3)/mcL    IG# 0.12 (H) 0 - 0.04 x10(3)/mcL    IG% 1.2 %    NRBC% 0.0 %   Hemoglobin and Hematocrit    Collection Time: 01/17/24  7:13 PM   Result Value Ref Range    Hgb 9.5 (L) 14.0 - 18.0 g/dL    Hct 30.9 (L) 42.0 - 52.0 %   Vancomycin, Random    Collection Time: 01/17/24  7:13 PM   Result Value Ref Range    Vanc Lvl Random 8.2 (L) 15.0 - 20.0 ug/ml   Comprehensive Metabolic Panel    Collection Time:  01/18/24 12:16 AM   Result Value Ref Range    Sodium Level 143 136 - 145 mmol/L    Potassium Level 3.2 (L) 3.5 - 5.1 mmol/L    Chloride 105 98 - 107 mmol/L    Carbon Dioxide 31 23 - 31 mmol/L    Glucose Level 159 (H) 82 - 115 mg/dL    Blood Urea Nitrogen 12.0 8.4 - 25.7 mg/dL    Creatinine 0.78 0.73 - 1.18 mg/dL    Calcium Level Total 8.1 (L) 8.8 - 10.0 mg/dL    Protein Total 6.1 5.8 - 7.6 gm/dL    Albumin Level 2.5 (L) 3.4 - 4.8 g/dL    Globulin 3.6 (H) 2.4 - 3.5 gm/dL    Albumin/Globulin Ratio 0.7 (L) 1.1 - 2.0 ratio    Bilirubin Total 0.6 <=1.5 mg/dL    Alkaline Phosphatase 51 40 - 150 unit/L    Alanine Aminotransferase 27 0 - 55 unit/L    Aspartate Aminotransferase 30 5 - 34 unit/L    eGFR >60 mls/min/1.73/m2   Magnesium    Collection Time: 01/18/24 12:16 AM   Result Value Ref Range    Magnesium Level 1.90 1.60 - 2.60 mg/dL   Phosphorus    Collection Time: 01/18/24 12:16 AM   Result Value Ref Range    Phosphorus Level 2.8 2.3 - 4.7 mg/dL   CBC with Differential    Collection Time: 01/18/24 12:16 AM   Result Value Ref Range    WBC 8.21 4.50 - 11.50 x10(3)/mcL    RBC 3.32 (L) 4.70 - 6.10 x10(6)/mcL    Hgb 9.4 (L) 14.0 - 18.0 g/dL    Hct 30.0 (L) 42.0 - 52.0 %    MCV 90.4 80.0 - 94.0 fL    MCH 28.3 27.0 - 31.0 pg    MCHC 31.3 (L) 33.0 - 36.0 g/dL    RDW 15.1 11.5 - 17.0 %    Platelet 182 130 - 400 x10(3)/mcL    MPV 10.4 7.4 - 10.4 fL    Neut % 66.6 %    Lymph % 21.7 %    Mono % 7.9 %    Eos % 2.4 %    Basophil % 0.4 %    Lymph # 1.78 0.6 - 4.6 x10(3)/mcL    Neut # 5.47 2.1 - 9.2 x10(3)/mcL    Mono # 0.65 0.1 - 1.3 x10(3)/mcL    Eos # 0.20 0 - 0.9 x10(3)/mcL    Baso # 0.03 <=0.2 x10(3)/mcL    IG# 0.08 (H) 0 - 0.04 x10(3)/mcL    IG% 1.0 %    NRBC% 0.0 %   RT Blood Gas    Collection Time: 01/18/24  1:29 AM   Result Value Ref Range    Sample Type Arterial Blood     Sample site Right Radial Artery     Drawn by tate,lrt     pH, Blood gas 7.570 (H) 7.350 - 7.450    pCO2, Blood gas 38.0 35.0 - 45.0 mmHg    pO2, Blood gas  67.0 (L) 80.0 - 100.0 mmHg    Sodium, Blood Gas 136 (L) 137 - 145 mmol/L    Potassium, Blood Gas 2.8 (L) 3.5 - 5.0 mmol/L    Calcium Level Ionized 1.08 (L) 1.12 - 1.23 mmol/L    TOC2, Blood gas 36.0 mmol/L    Base Excess, Blood gas 11.80 (H) -2.00 - 2.00 mmol/L    sO2, Blood gas 95.6 %    HCO3, Blood gas 34.8 (H) 22.0 - 26.0 mmol/L    THb, Blood gas 9.4 (L) 12 - 16 g/dL    O2 Hb, Blood Gas 93.9 (L) 94.0 - 97.0 %    CO Hgb 1.5 0.5 - 1.5 %    Met Hgb 0.8 0.4 - 1.5 %    Allens Test Yes     MODE SIMV     Oxygen Device, Blood gas Ventilator     FIO2, Blood gas 30 %    Mech Vt 460 ml    Mech RR 10 b/min    PEEP 5.0 cmH2O    PS 15.0 cmH2O   RT Blood Gas    Collection Time: 01/18/24  6:05 AM   Result Value Ref Range    Sample Type Arterial Blood     Sample site Left Radial Artery     Drawn by sd rrt     pH, Blood gas 7.530 (H) 7.350 - 7.450    pCO2, Blood gas 41.0 35.0 - 45.0 mmHg    pO2, Blood gas 130.0 (H) 80.0 - 100.0 mmHg    Sodium, Blood Gas 138 137 - 145 mmol/L    Potassium, Blood Gas 2.8 (L) 3.5 - 5.0 mmol/L    Calcium Level Ionized 1.08 (L) 1.12 - 1.23 mmol/L    TOC2, Blood gas 35.6 mmol/L    Base Excess, Blood gas 10.60 (H) -2.00 - 2.00 mmol/L    sO2, Blood gas 99.2 %    HCO3, Blood gas 34.3 (H) 22.0 - 26.0 mmol/L    THb, Blood gas 9.4 (L) 12 - 16 g/dL    O2 Hb, Blood Gas 96.8 94.0 - 97.0 %    CO Hgb 1.4 0.5 - 1.5 %    Met Hgb 0.7 0.4 - 1.5 %    Allens Test Yes     MODE SIMV     Oxygen Device, Blood gas Ventilator     FIO2, Blood gas 40 %    Mech Vt 460 ml    Mech RR 10 b/min    PEEP 5.0 cmH2O    PS 15.0 cmH2O       CT Abdomen Pelvis With IV Contrast NO Oral Contrast    Result Date: 1/17/2024  EXAMINATION: CT ABDOMEN PELVIS WITH IV CONTRAST CLINICAL HISTORY: Nausea/vomiting; TECHNIQUE: Low dose axial images, sagittal and coronal reformations were obtained from the lung bases to the pubic symphysis following the IV administration of contrast. Automatic exposure control (AEC) is utilized to reduce patient radiation  exposure. COMPARISON: None. FINDINGS: There is bibasilar atelectasis.  There is a small left-sided pleural effusion. There is a extra ostomy tube in the stomach.. The liver appears normal.  No liver mass or lesion is seen.  Portal and hepatic veins appear normal. The gallbladder appears normal.  No obvious gallstones are seen.  No biliary dilatation is seen.  No pericholecystic fluid is seen. The pancreas appears normal.  No pancreatic mass or lesion is seen. The spleen shows no acute abnormality. The adrenal glands appear normal.  No adrenal nodule is seen. The kidneys appear normal.  There is a cyst in the lower pole the right kidney.  No hydronephrosis is seen.  No hydroureter is seen.  No nephrolithiasis is seen.  No obvious ureteral stones are seen. The urinary bladder is decompressed due to Fernandez catheter.  Some inflammatory changes are seen around the urinary bladder.  Cystitis should be excluded.. There is some inflammatory changes in the right lower quadrant adjacent to the cecum with some punctate lymph nodes seen in the region.  Findings could represent a mild mesenteric adenitis.  No colitis is seen.  No diverticulitis is seen.  No obvious colonic mass or lesion is seen.  The appendix appears normal. No free air is seen.  No free fluid is seen.     Mild inflammatory changes in the right lower quadrant in the mesentery with some punctate lymph nodes seen adjacent to it.  Findings could represent mesenteric adenitis. Some inflammatory changes around the urinary bladder.  Cystitis should be excluded. Left lower lobe and right lower lobe atelectasis with a small left-sided pleural effusion Electronically signed by: Erick Grant Date:    01/17/2024 Time:    16:06    CT Head Without Contrast    Result Date: 1/17/2024  EXAMINATION: CT HEAD WITHOUT CONTRAST CLINICAL HISTORY: Seizure, new-onset, no history of trauma; TECHNIQUE: Multiple axial images were obtained from the base of the brain to the vertex without  contrast administration.  Sagittal and coronal reconstructions were performed. .Automatic exposure control  (AEC) is utilized to reduce patient radiation exposure. COMPARISON: 01/02/2024 FINDINGS: The patient is status post previous craniotomy in the right temporal and parietal region.  There is a significant amount of edema seen in the right cerebral hemisphere.  The edema appears worse than the prior examination.  The areas of hemorrhage that was seen previously in the right cerebral hemisphere shown some significant improvement.  There is evidence of hydrocephalus which is slightly worse than the prior examination.  No parenchymal abnormality seen in the left cerebral hemisphere.  No focal mass is seen. Posterior fossa appears grossly unremarkable.     Interval worsening of the cerebral edema in the right cerebral hemisphere Hydrocephalus slightly worse Intraparenchymal areas of hemorrhage that was seen previously has shown some significant improvement. Electronically signed by: Erick Grant Date:    01/17/2024 Time:    15:59    X-Ray Chest AP Portable    Result Date: 1/17/2024  EXAMINATION: XR CHEST AP PORTABLE CLINICAL HISTORY: fever; TECHNIQUE: Single frontal view of the chest was performed. COMPARISON: January 17, 2024 FINDINGS: Cardiomediastinal silhouette and pleuroparenchymal changes are essentially unchanged as compared with the previous exam     No significant change Electronically signed by: Vik Keen Date:    01/17/2024 Time:    13:14    X-Ray Abdomen AP 1 View (KUB)    Result Date: 1/17/2024  EXAMINATION: XR ABDOMEN AP 1 VIEW CLINICAL HISTORY: Vomiting, unspecified TECHNIQUE: AP X-RAY OF THE ABDOMEN: CPT 78843 FINDINGS: Seven sub some residual feces throughout the colon the gas pattern is nonspecific with no clear evidence of ileus or obstruction no abnormal masses or calcifications identified gastrostomy cannula projects over the left upper quadrant     Nonspecific gas pattern. Gastrostomy  cannula as above Electronically signed by: Vik Keen Date:    01/17/2024 Time:    13:13    X-Ray Abdomen AP 1 View    Result Date: 1/17/2024  EXAMINATION: XR ABDOMEN AP 1 VIEW CLINICAL HISTORY: vomited; TECHNIQUE: AP X-RAY OF THE ABDOMEN: CPT 87282 FINDINGS: Examination reveals some residual feces throughout the colon the gas pattern is nonspecific with no clear evidence of ileus or obstruction no abnormal masses or calcifications identified no other abnormality seen     Nonspecific gas pattern Electronically signed by: Vik Keen Date:    01/17/2024 Time:    07:13    X-Ray Chest 1 View    Result Date: 1/17/2024  EXAMINATION: XR CHEST 1 VIEW CPT 90506 CLINICAL HISTORY: Respiratory Failure; COMPARISON: January 15, 2024 FINDINGS: Examination reveals cardiomediastinal silhouette and pleuroparenchymal changes to be essentially unchanged as compared with the previous exam     No significant change Electronically signed by: Vik Keen Date:    01/17/2024 Time:    07:07    Echo Saline Bubble? No    Result Date: 1/16/2024    TDS. No Definity contrast available for use.   Left Ventricle: The left ventricle is normal in size. Moderately increased wall thickness. Normal wall motion. There is normal systolic function. Ejection fraction by visual approximation is 55%.   Right Ventricle: Normal right ventricular cavity size. Systolic function is borderline low.   Left Atrium: Left atrium is severely dilated.   Right Atrium: Right atrium is mildly dilated.   Mitral Valve: There is bileaflet sclerosis. There is mild regurgitation.   IVC/SVC: Normal venous pressure at 3 mmHg. No obvious signs of intracardiac infection or valvular vegetation noted.     X-Ray Chest 1 View for Line/Tube Placement    Result Date: 1/15/2024  EXAMINATION: XR CHEST 1 VIEW FOR LINE/TUBE PLACEMENT CLINICAL HISTORY: picc; TECHNIQUE: Frontal view(s) of the chest. COMPARISON: Radiography 01/08/2024 FINDINGS: Right PICC line tip overlies  the lower SVC.  No pneumothorax identified.  Possible small left pleural effusion.     Right PICC line tip over the lower SVC. Electronically signed by: Polo Daniel Date:    01/15/2024 Time:    14:59    X-Ray Chest 1 View    Result Date: 1/8/2024  EXAMINATION: XR CHEST 1 VIEW CPT 75302 CLINICAL HISTORY: respiratory failure; COMPARISON: December 31, 2023 FINDINGS: Examination reveals mediastinal silhouette to be within normal limits cardiac silhouette is enlarged there is increased left retrocardiac density and silhouetting of the left hemidiaphragm which might be related to an infiltrate/atelectasis. Slightly more confluent airspace opacities identified in the right infrahilar region and right cardiophrenic angle region early infiltrate can not be completely excluded. Tracheostomy cannula is seen with the tip above the jackie     Cardiomegaly with increased left retrocardiac density and silhouetting of the left hemidiaphragm. Slightly more confluent opacities in the right infrahilar region and right cardiophrenic angle region. No other change Electronically signed by: Vik Keen Date:    01/08/2024 Time:    13:49    CT Head Without Contrast    Result Date: 1/2/2024  EXAMINATION: CT HEAD WITHOUT CONTRAST CLINICAL HISTORY: Stroke, follow up;repeat CTh 14 days after thrombectomy to determine if anticoagulation can be started; TECHNIQUE: Sequential axial images were performed of the brain without contrast. Dose product length of 1018 mGycm. Automated exposure control was utilized to minimize radiation dose. COMPARISON: December 22, 2023. FINDINGS: There is right wide craniotomy.  Interval improved right cerebral edema related to infarct and associated multiple hemorrhagic foci.  There is resolved mass effect upon the right lateral ventricle and there is no right to left midline shift.  No new intra-axial or extra-axial hemorrhage.  No acute findings of the left cerebrum.  There is chronic microvascular ischemia  and atrophy. There is no acute extra axial fluid collection.  There is prominent mucosal thickening involving the sphenoid sinus, ethmoidal air cells and to a lesser degree the ethmoidal air cells.  Bilateral similar loss of pneumatization of the mastoid air cells with opacification.     Improved right cerebral edema, hemorrhagic foci, mass effect and no residual midline shift. Electronically signed by: Sami Taylor Date:    01/02/2024 Time:    09:34    US Abdomen Complete    Result Date: 12/31/2023  EXAMINATION: US ABDOMEN COMPLETE CLINICAL HISTORY: Abdominal distention, decrease urine output, transaminitis; TECHNIQUE: Limited abdominal Ultrasound Images obtained in grayscale and color. COMPARISON: None FINDINGS: The LIVER is normal in size and contour at 16.5 cm (sagittal right lobe). Hepatic parenchyma has increased echogenicity as would be seen with fatty infiltration. The BILIARY SYSTEM is normal in caliber; the common hepatic duct measures 3 mm.There are no  stones seen in the GALL BLADDER. There is no evidence of acute cholecystitis. The PANCREATIC head and body are predominantly obscured by bowel gas. The RIGHT KIDNEY is normal in size at 13 cm and echogenicity/texture. No stones or hydronephrosis seen. No solid masses are noted. The left kidney is normal in size at 11 cm.  There is no increased echogenicity.  No stones or hydronephrosis. The spleen is unremarkable 111.3 cm. The AORTA and IVC are predominantly obscured by bowel gas.     No acute sonographic abnormality.  Findings may be seen with hepatic steatosis.  Small right pleural effusion noted. Electronically signed by: Jg Plunkett Date:    12/31/2023 Time:    11:24    X-Ray Abdomen AP 1 View    Result Date: 12/31/2023  EXAMINATION: XR ABDOMEN AP 1 VIEW CLINICAL HISTORY: abd distention, lack of BM for 4 days, emesis; TECHNIQUE: Single-view of the abdomen COMPARISON: 12/29/2023 FINDINGS: No acute osseous abnormality.  Nonobstructive bowel gas  pattern.  NG tube projects over the left upper quadrant.     Nonobstructive bowel gas pattern. Electronically signed by: Jg Plunkett Date:    12/31/2023 Time:    09:57    X-Ray Chest 1 View    Result Date: 12/31/2023  EXAMINATION: XR CHEST 1 VIEW CLINICAL HISTORY: resp failure; TECHNIQUE: Single view of the chest COMPARISON: 12/29/2023 FINDINGS: Tracheostomy is midline.  Cardiomegaly remains.  No pleural effusion or pneumothorax.     No adverse interval change appreciated.  Tracheostomy remains. Electronically signed by: Jg Plunkett Date:    12/31/2023 Time:    09:55    X-Ray Chest 1 View    Result Date: 12/29/2023  EXAMINATION: XR CHEST 1 VIEW CLINICAL HISTORY: Status post trach; TECHNIQUE: One view COMPARISON: Nine December 27, 2023. FINDINGS: Cardiopericardial silhouette enlarged appearance similar.  Cardiac device electrodes are in similar location.  Tracheostomy replaces the endotracheal tube.  Nasogastric tube traverses into the stomach similar location.  No acute dense focal or segmental consolidation, congestive process, pleural effusions or pneumothorax.     NO ACUTE CARDIOPULMONARY PROCESS IDENTIFIED. Electronically signed by: Sami Taylor Date:    12/29/2023 Time:    22:18    XR Gastric tube check, non-radiologist performed    Result Date: 12/29/2023  EXAMINATION: XR GASTRIC TUBE CHECK, NON-RADIOLOGIST PERFORMED CLINICAL HISTORY: verification; COMPARISON: Earlier today FINDINGS: Frontal image of the upper abdomen.  Enteric tube extends well into the stomach.     As above. Electronically signed by: Tani Calderon Date:    12/29/2023 Time:    16:04    XR Gastric tube check, non-radiologist performed    Result Date: 12/29/2023  EXAMINATION: XR GASTRIC TUBE CHECK, NON-RADIOLOGIST PERFORMED CLINICAL HISTORY: verification; COMPARISON: None FINDINGS: A nasogastric tube is seen with the tip in the antrum of the stomach perhaps even past the pylorus     As above Electronically signed by: Vik Keen  Date:    12/29/2023 Time:    15:10    X-Ray Chest 1 View    Result Date: 12/27/2023  EXAMINATION: XR CHEST 1 VIEW CPT 29557 CLINICAL HISTORY: tube replacement; COMPARISON: December 27, 2023 FINDINGS: Examination reveals cardiomediastinal silhouette and pleuroparenchymal changes to be essentially unchanged as compared with the previous exam. Endotracheal tube is seen with the tip at the level of the clavicular heads     No significant change Electronically signed by: Vik Keen Date:    12/27/2023 Time:    12:56    X-Ray Chest 1 View    Result Date: 12/27/2023  EXAMINATION: XR CHEST 1 VIEW CPT 56895 CLINICAL HISTORY: tube placement; COMPARISON: December 24th 2023 FINDINGS: Cardiomediastinal silhouette and pleuroparenchymal changes are essentially unchanged as compared with the previous exam. Endotracheal tube is seen with the tip above the jackie nasogastric tube is seen with the tip below the diaphragm     No significant change. Support catheters as above Electronically signed by: Vik Keen Date:    12/27/2023 Time:    12:53    CV Ultrasound doppler venous arm left    Result Date: 12/26/2023  There was no evidence of deep vein thrombosis in the left upper extremity. A superficial thrombosis was identified in the left cephalic vein.     CV Ultrasound doppler arterial arm left    Result Date: 12/26/2023  Patent left upper extremity arterial system with no evidence of focal stenosis or flow reduction. Triphasic waveforms identified throughout.     X-Ray Chest 1 View    Result Date: 12/24/2023  EXAMINATION XR CHEST 1 VIEW CLINICAL HISTORY respiratory failure; TECHNIQUE A total of 2 frontal image(s) of the chest. COMPARISON 20 December 2023 FINDINGS Lines/tubes/devices: Grossly unchanged positioning when allowing for differences in technique and patient rotation. The cardiac silhouette and central vascular structures are unchanged.  The trachea is midline. No new or worsening consolidation is identified.  There is no enlarging pleural effusion or convincing pneumothorax. Regional osseous structures and extrathoracic soft tissues are similar. IMPRESSION No significant interval change. Electronically signed by: Isaac Carcamo Date:    12/24/2023 Time:    13:09    CT Head Without Contrast    Result Date: 12/22/2023  EXAMINATION: CT HEAD WITHOUT CONTRAST CLINICAL HISTORY: Stroke, follow up; TECHNIQUE: Low dose axial CT images obtained throughout the head without intravenous contrast.  Axial, sagittal and coronal reconstructions were performed and interpreted. DLP: 1181 mGycm All CT scans at this location are performed using dose optimization techniques as appropriate to a performed exam including the following automated exposure control, adjustment of the mA and/or kV according to patient size and/or use of iterative reconstruction technique COMPARISON: CT head 12/21/2023 FINDINGS: BRAIN: Massive right cerebral hemisphere infarct is again seen in similar configuration to the previous exam with loss of gray-white differentiation.  There is cytotoxic edema and gyral swelling.  Areas of hemorrhagic conversion throughout the area of infarction are very similar to the prior exam.  No evidence of enlarging bleed.  There are postsurgical changes of decompressive craniectomy with expected, unchanged bulging of the cerebrum at the craniectomy defect.  There is approximately 5 mm right to left midline shift at the level of the superior margin of the thalami, similar to previous.  The posterior fossa and midline structures are unremarkable. VENTRICLES: There is mass effect on the right lateral ventricle related to cerebral edema.  Unchanged appearance of the left lateral ventricle with mild stable dilatation. EXTRA-AXIAL: There is a right subdural drain in place. BONES: Postop right craniectomy. SINUSES AND MASTOIDS: Visualized paranasal sinuses and mastoid air cells are clear.     No significant interval change compared to previous  exam.  Large right hemispheric infarct with hemorrhagic transformation similar to prior.  Postop decompressive right craniectomy. Electronically signed by: Laureen Christina Date:    12/22/2023 Time:    09:16    CT Head Without Contrast    Result Date: 12/21/2023  EXAMINATION: CT HEAD WITHOUT CONTRAST CLINICAL HISTORY: Postop; TECHNIQUE: Axial scans were obtained from skull base to the vertex. Coronal and sagittal reconstructions obtained from the axial data. Automatic exposure control was utilized to limit radiation dose. Contrast: None Radiation Dose: Total DLP: 1012 mGy*cm COMPARISON: CT head dated 12/20/2023 FINDINGS: There are postoperative changes following right-sided craniectomy with subdural drain in place.  There are evolving ischemic changes in the right cerebral hemisphere with interval development of numerous parenchymal hemorrhages. There is mass effect with sulcal effacement, partial effacement of the right lateral ventricle and 6 mm of leftward midline shift, overall significantly improved.  The basal cisterns are partially effaced.  There is mild dilatation of the left lateral ventricle.  Skull base is intact.  There is mild scattered paranasal sinus mucosal thickening.     1. Postoperative changes following right-sided craniectomy. 2. Evolving ischemic changes in the right cerebral hemisphere with interval development of hemorrhagic transformation. 3. Interval improvement of mass effect. No significant change from the [facility] interpretation. Electronically signed by: Vonnie Haney Date:    12/21/2023 Time:    06:47    X-Ray Chest 1 View    Result Date: 12/20/2023  EXAMINATION: XR CHEST 1 VIEW CLINICAL HISTORY: other; TECHNIQUE: Single frontal view of the chest was performed. COMPARISON: 12/20/2023 FINDINGS: Endotracheal tube, enteric tube, right IJ central venous catheter in right chest wall AICD are unchanged position. The heart size enlarged the pulmonary vasculature is congested. Left  retrocardiac opacity identified with hypoventilatory changes lungs bibasilar atelectatic changes.  No effusion on the right.     Lines and tubes as above without significant interval change.  Persistent interstitial edema with likely left-sided effusion. Electronically signed by: Rico Ornelas MD Date:    12/20/2023 Time:    21:24    X-Ray Chest 1 View for Line/Tube Placement    Result Date: 12/20/2023  EXAMINATION: XR CHEST 1 VIEW FOR LINE/TUBE PLACEMENT CLINICAL HISTORY: s/p intubation; TECHNIQUE: Single frontal portable view of the chest was performed. COMPARISON: None FINDINGS: Examination reveals mediastinal silhouette to be within normal limits cardiac silhouette is mildly enlarged some increase interstitial markings and slightly more confluent airspace opacities identified specially in the left lung these might be related to poor inspiratory effort, however, superimposed infiltrate can not be completely excluded. There is an endotracheal tube with tip above the jackie nasogastric tube is seen with the tip below the diaphragm     Poor inspiratory effort accentuates the pulmonary vascular markings. Mild cardiomegaly. Slightly more confluent opacities in the left perihilar region and left base although it might be related to the poor inspiratory effort infiltrate cannot be completely excluded. Interval insertion of endotracheal tube and nasogastric tube Electronically signed by: Vik Keen Date:    12/20/2023 Time:    10:29    CT Head Without Contrast    Result Date: 12/20/2023  EXAMINATION: CT HEAD WITHOUT CONTRAST CLINICAL HISTORY: Neuro deficit, acute, stroke suspected;stroke alert;; TECHNIQUE: Low dose axial images were obtained through the head.  Coronal and sagittal reformations were also performed. Contrast was not administered. Automatic exposure control was utilized to reduce the patient's radiation dose. DLP= 965 COMPARISON: 12/09/2023 FINDINGS: Increasing hypodensity and edema throughout the  distribution of the right MCA.  There is increasing mass effect with 1 point 6 cm of right to left midline shift.  There is complete compression of the right lateral ventricle.     Worsening exam with development of 1.6 cm of right to left midline shift. Findings reported to Dr. Pearce prior to interpretation. Electronically signed by: Jg Plunkett Date:    12/20/2023 Time:    09:11    CV Ultrasound Bilateral Doppler Carotid    Result Date: 12/19/2023  The right internal carotid artery demonstrated less than 50% stenosis. The left internal carotid artery demonstrated less than 50% stenosis. The bilateral vertebral arteries were patent with antegrade flow. Bilateral internal carotid arteries demonstrated decreased velocities starting from the common carotid arteries.     Echo Saline Bubble? Yes    Result Date: 12/19/2023    Left Ventricle: There is moderate concentric hypertrophy. Normal wall motion. There is low normal systolic function with a visually estimated ejection fraction of 50 - 55%. Unable to assess diastolic function due to atrial fibrillation. Elevated left ventricular filling pressure.   Right Ventricle: Normal right ventricular cavity size. Systolic function is mildly reduced.   Left Atrium: Left atrium is mildly dilated. Agitated saline study of the atrial septum is negative, suggesting absence of intracardiac shunt at the atrial level.   Right Atrium: Right atrium is dilated.   Mitral Valve: There is mild to moderate regurgitation with an anteromedial eccentrically directed jet.   Negative bubble study     IR Thrombectomy Intracranial Inc all Imaging    Result Date: 12/19/2023  EXAMINATION: IR THROMBECTOMY INTRACRANIAL INC ALL IMAGING CLINICAL HISTORY: Cerebral infarction, unspecifiedstroke; FINDINGS: Fluoroscopic assistance provided during vascular procedure.  Images were independently interpreted by the attending physician performing the procedure. Fluoro time 9.7 minutes. Reference Air Kerma:  917 mGy.     Fluoroscopic assistance provided as above. Electronically signed by: Tani Calderon Date:    12/19/2023 Time:    15:11    CTA STROKE MULTI-PHASE    Result Date: 12/19/2023  EXAMINATION: CTA STROKE MULTI-PHASE CLINICAL HISTORY: Neuro deficit, acute, stroke suspected; TECHNIQUE: CT angiogram was performed from the level of the jackie to the vertex prior to and following the IV administration of intravenous contrast.  Axial, sagittal and coronal reconstructions and maximum intensity projection reconstructions were performed. Arterial stenosis percentages are based on NASCET measurement criteria. Automated tube current modulation, weight-based exposure dosing, and/or iterative reconstruction technique utilized to reach lowest reasonably achievable exposure rate. DLP: 2045 mGycm COMPARISON: CT head 12/19/2023 at 10:57 hours FINDINGS: CTA NECK: AORTIC ARCH AND GREAT VESSELS: 2 vessel left aortic arch. RIGHT ICA: There is atherosclerotic plaque at the carotid bulb and proximal internal carotid artery with less than 50% stenosis.  There is irregular contour and inhomogeneous enhancement of the cervical carotid artery at the skull base and at the petrous carotid artery (for example series 1, image 289). LEFT ICA: Mild atherosclerotic plaque at the carotid bulb without hemodynamically significant stenosis. VERTEBRAL ARTERIES: Diminutive vertebral arteries without stenosis. CTA HEAD: ANTERIOR CIRCULATION: On the arterial phase imaging there is asymmetric hypo perfusion and irregular appearance of the cervical carotid artery on the right.  There is poor enhancement at the HÉCTOR and M1 segment.  Contrast fades distally towards the M2 segment.  There is gross asymmetric hypoenhancement of the vasculature at the sylvian fissure when comparing right to left on the arterial phase.  On a slightly delayed phase obtained approximately 30-40 seconds later there is enhancement at the right anterior circulation which is now more  symmetric compared to the left suggesting potential delayed in flow phenomenon or perfusion via the [id number].  The left anterior circulation enhances normally without significant stenosis. POSTERIOR CIRCULATION: No hemodynamically significant stenosis, aneurysmal dilatation, or dissection. NON-VASCULAR STRUCTURES (LIMITED EVALUATION): There is very subtle loss of gray-white differentiation in the region of the insular cortex and right frontal lobe and slight blurring of delineation of the basal ganglia on the right.  No appreciable hemorrhage. Poor dentition.  Dental caries and periapical lucency.     Irregular appearance of the right internal carotid artery near the skull base and petrous portion.  This is of uncertain etiology.  This could be related to soft atheromatous plaque, ill-defined dissection flap or artifact. Poor enhancement of the right anterior circulation including the MCA and HÉCTOR on arterial phase.  There is delayed enhancement of the right HÉCTOR and MCA seen on delayed phase postcontrast imaging suggesting upstream/inflow abnormality. Very subtle asymmetric loss of gray-white differentiation in the right cerebral hemisphere most pronounced at the frontal lobe and basal ganglia.  No appreciable hemorrhage. Findings discussed with clinician caring for patient Dr. Randle 12/19/2023 12:39. Electronically signed by: Laureen Christina Date:    12/19/2023 Time:    12:59    CT HEAD FOR STROKE    Result Date: 12/19/2023  EXAMINATION: CT HEAD FOR STROKE CLINICAL HISTORY: Neuro deficit, acute, stroke suspected; TECHNIQUE: CT imaging of the head performed from the skull base to the vertex without intravenous contrast.  mGycm. Automatic exposure control, adjustment of mA/kV or iterative reconstruction technique was used to reduce radiation. COMPARISON: 23 May 2023 FINDINGS: There is no acute cortical infarct, hemorrhage or mass lesion.  No new parenchymal attenuation abnormality.  Ventricular  size is stable.  There are vascular calcifications. Visualized paranasal sinuses and mastoid air cells are clear.     No acute intracranial findings or significant interval change compared to May 2023. Electronically signed by: Tani Calderon Date:    12/19/2023 Time: 11:33        Assessment & Plan:    Acute coffee ground emesis 1/17/24  - event unwitnessed by inpatient staff. hgb remains baseline  - no bleeding since admission. No evidence of bloody PEG or rectal tube output  - continue PPI for now  2. CVA s/p thrombectomy w/ hemorrhagic conversion s/p craniectomy  3. Resp failure requiring trach  4. DVT   5. Bacteremia  - UTI and also respiratory cultures positive      - no immediate indication for endoscopy.  - ok for tube feeds today  - continue to monitor.   - hold tube feeds after midnight just in case. If there is a drop in H&H or evidence of overt bleeding we can consider endoscopy tomorrow     Thank you for allowing us to participate in this patient's care.    Jessica Garcia PA-C  [facility], [facility]

## 2024-01-19 LAB
ALBUMIN SERPL-MCNC: 2.3 G/DL (ref 3.4–4.8)
ALBUMIN/GLOB SERPL: 0.7 RATIO (ref 1.1–2)
ALP SERPL-CCNC: 51 UNIT/L (ref 40–150)
ALT SERPL-CCNC: 24 UNIT/L (ref 0–55)
AST SERPL-CCNC: 24 UNIT/L (ref 5–34)
BACTERIA BLD CULT: NORMAL
BASOPHILS # BLD AUTO: 0.04 X10(3)/MCL
BASOPHILS NFR BLD AUTO: 0.8 %
BILIRUB SERPL-MCNC: 0.5 MG/DL
BUN SERPL-MCNC: 9.3 MG/DL (ref 8.4–25.7)
CALCIUM SERPL-MCNC: 8 MG/DL (ref 8.8–10)
CHLORIDE SERPL-SCNC: 103 MMOL/L (ref 98–107)
CO2 SERPL-SCNC: 29 MMOL/L (ref 23–31)
CREAT SERPL-MCNC: 0.79 MG/DL (ref 0.73–1.18)
EOSINOPHIL # BLD AUTO: 0.44 X10(3)/MCL (ref 0–0.9)
EOSINOPHIL NFR BLD AUTO: 8.3 %
ERYTHROCYTE [DISTWIDTH] IN BLOOD BY AUTOMATED COUNT: 15.1 % (ref 11.5–17)
GFR SERPLBLD CREATININE-BSD FMLA CKD-EPI: >60 MLS/MIN/1.73/M2
GLOBULIN SER-MCNC: 3.4 GM/DL (ref 2.4–3.5)
GLUCOSE SERPL-MCNC: 132 MG/DL (ref 82–115)
HCT VFR BLD AUTO: 31 % (ref 42–52)
HGB BLD-MCNC: 9.6 G/DL (ref 14–18)
IMM GRANULOCYTES # BLD AUTO: 0.06 X10(3)/MCL (ref 0–0.04)
IMM GRANULOCYTES NFR BLD AUTO: 1.1 %
LYMPHOCYTES # BLD AUTO: 1.45 X10(3)/MCL (ref 0.6–4.6)
LYMPHOCYTES NFR BLD AUTO: 27.3 %
MAGNESIUM SERPL-MCNC: 2 MG/DL (ref 1.6–2.6)
MCH RBC QN AUTO: 28.2 PG (ref 27–31)
MCHC RBC AUTO-ENTMCNC: 31 G/DL (ref 33–36)
MCV RBC AUTO: 91.2 FL (ref 80–94)
MONOCYTES # BLD AUTO: 0.56 X10(3)/MCL (ref 0.1–1.3)
MONOCYTES NFR BLD AUTO: 10.5 %
NEUTROPHILS # BLD AUTO: 2.76 X10(3)/MCL (ref 2.1–9.2)
NEUTROPHILS NFR BLD AUTO: 52 %
NRBC BLD AUTO-RTO: 0 %
PHOSPHATE SERPL-MCNC: 3.1 MG/DL (ref 2.3–4.7)
PLATELET # BLD AUTO: 171 X10(3)/MCL (ref 130–400)
PMV BLD AUTO: 10.5 FL (ref 7.4–10.4)
POTASSIUM SERPL-SCNC: 3.2 MMOL/L (ref 3.5–5.1)
PROT SERPL-MCNC: 5.7 GM/DL (ref 5.8–7.6)
RBC # BLD AUTO: 3.4 X10(6)/MCL (ref 4.7–6.1)
SODIUM SERPL-SCNC: 142 MMOL/L (ref 136–145)
VANCOMYCIN TROUGH SERPL-MCNC: 22.2 UG/ML (ref 15–20)
WBC # SPEC AUTO: 5.31 X10(3)/MCL (ref 4.5–11.5)

## 2024-01-19 PROCEDURE — 94761 N-INVAS EAR/PLS OXIMETRY MLT: CPT

## 2024-01-19 PROCEDURE — 63600175 PHARM REV CODE 636 W HCPCS: Performed by: INTERNAL MEDICINE

## 2024-01-19 PROCEDURE — 99900026 HC AIRWAY MAINTENANCE (STAT)

## 2024-01-19 PROCEDURE — 25000003 PHARM REV CODE 250: Performed by: INTERNAL MEDICINE

## 2024-01-19 PROCEDURE — 84100 ASSAY OF PHOSPHORUS: CPT | Performed by: INTERNAL MEDICINE

## 2024-01-19 PROCEDURE — 83735 ASSAY OF MAGNESIUM: CPT | Performed by: INTERNAL MEDICINE

## 2024-01-19 PROCEDURE — 20000000 HC ICU ROOM

## 2024-01-19 PROCEDURE — 94003 VENT MGMT INPAT SUBQ DAY: CPT

## 2024-01-19 PROCEDURE — 80053 COMPREHEN METABOLIC PANEL: CPT | Performed by: INTERNAL MEDICINE

## 2024-01-19 PROCEDURE — 27100171 HC OXYGEN HIGH FLOW UP TO 24 HOURS

## 2024-01-19 PROCEDURE — C9113 INJ PANTOPRAZOLE SODIUM, VIA: HCPCS | Performed by: INTERNAL MEDICINE

## 2024-01-19 PROCEDURE — 25000003 PHARM REV CODE 250

## 2024-01-19 PROCEDURE — 99900031 HC PATIENT EDUCATION (STAT)

## 2024-01-19 PROCEDURE — 27200966 HC CLOSED SUCTION SYSTEM

## 2024-01-19 PROCEDURE — 99900035 HC TECH TIME PER 15 MIN (STAT)

## 2024-01-19 PROCEDURE — 85025 COMPLETE CBC W/AUTO DIFF WBC: CPT | Performed by: INTERNAL MEDICINE

## 2024-01-19 PROCEDURE — 80202 ASSAY OF VANCOMYCIN: CPT | Performed by: INTERNAL MEDICINE

## 2024-01-19 RX ORDER — PANTOPRAZOLE SODIUM 40 MG/10ML
40 INJECTION, POWDER, LYOPHILIZED, FOR SOLUTION INTRAVENOUS 2 TIMES DAILY
Status: DISCONTINUED | OUTPATIENT
Start: 2024-01-19 | End: 2024-01-24 | Stop reason: HOSPADM

## 2024-01-19 RX ORDER — POTASSIUM CHLORIDE 14.9 MG/ML
20 INJECTION INTRAVENOUS
Status: COMPLETED | OUTPATIENT
Start: 2024-01-19 | End: 2024-01-19

## 2024-01-19 RX ORDER — POTASSIUM CHLORIDE 20 MEQ/1
40 TABLET, EXTENDED RELEASE ORAL 2 TIMES DAILY
Status: DISCONTINUED | OUTPATIENT
Start: 2024-01-19 | End: 2024-01-19

## 2024-01-19 RX ADMIN — QUETIAPINE FUMARATE 25 MG: 25 TABLET ORAL at 08:01

## 2024-01-19 RX ADMIN — RIVAROXABAN 20 MG: 10 TABLET, FILM COATED ORAL at 06:01

## 2024-01-19 RX ADMIN — DILTIAZEM HYDROCHLORIDE 240 MG: 60 TABLET, FILM COATED ORAL at 08:01

## 2024-01-19 RX ADMIN — DEXMEDETOMIDINE HYDROCHLORIDE 0.2 MCG/KG/HR: 400 INJECTION INTRAVENOUS at 06:01

## 2024-01-19 RX ADMIN — VANCOMYCIN HYDROCHLORIDE 1500 MG: 1.5 INJECTION, POWDER, LYOPHILIZED, FOR SOLUTION INTRAVENOUS at 10:01

## 2024-01-19 RX ADMIN — CARVEDILOL 3.12 MG: 3.12 TABLET, FILM COATED ORAL at 08:01

## 2024-01-19 RX ADMIN — LEVETIRACETAM INJECTION 1500 MG: 15 INJECTION INTRAVENOUS at 08:01

## 2024-01-19 RX ADMIN — PANTOPRAZOLE SODIUM 8 MG/HR: 40 INJECTION, POWDER, FOR SOLUTION INTRAVENOUS at 04:01

## 2024-01-19 RX ADMIN — POTASSIUM CHLORIDE 20 MEQ: 14.9 INJECTION, SOLUTION INTRAVENOUS at 08:01

## 2024-01-19 RX ADMIN — LOSARTAN POTASSIUM 50 MG: 50 TABLET, FILM COATED ORAL at 08:01

## 2024-01-19 RX ADMIN — PANTOPRAZOLE SODIUM 40 MG: 40 INJECTION, POWDER, FOR SOLUTION INTRAVENOUS at 11:01

## 2024-01-19 RX ADMIN — VANCOMYCIN HYDROCHLORIDE 1750 MG: 500 INJECTION, POWDER, LYOPHILIZED, FOR SOLUTION INTRAVENOUS at 09:01

## 2024-01-19 RX ADMIN — ATORVASTATIN CALCIUM 10 MG: 10 TABLET, FILM COATED ORAL at 08:01

## 2024-01-19 RX ADMIN — MUPIROCIN: 20 OINTMENT TOPICAL at 08:01

## 2024-01-19 RX ADMIN — POTASSIUM CHLORIDE 20 MEQ: 14.9 INJECTION, SOLUTION INTRAVENOUS at 11:01

## 2024-01-19 RX ADMIN — PANTOPRAZOLE SODIUM 40 MG: 40 INJECTION, POWDER, FOR SOLUTION INTRAVENOUS at 08:01

## 2024-01-19 RX ADMIN — LEVOFLOXACIN 750 MG: 750 INJECTION, SOLUTION INTRAVENOUS at 06:01

## 2024-01-19 NOTE — PROGRESS NOTES
[facility] - Emergency Dept  Pulmonary Critical Care Note    Patient Name: Delio Daniel Jr.  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  Patient was recently transferred to [facility] 01/16/2024 but presents to the emergency department this afternoon with complaints of seizure-like activity and coffee-ground emesis.  Patient is unable to provide any history himself therefore HPI primarily obtained from chart review.  Patient was seen by Dr. Dewitt this morning at the [facility] and per his note this morning patient did have seizure-like activity that was abated with Keppra.  It is also mentioned that patient did have hematemesis this morning but this can not be quantified but per Dr. Dominguez note patient was having projectile vomiting however this was un quantified.  Since this event, the patient's tube feedings have been placed on hold.  Furthermore patient did also apparently have some tachycardia post seizure and some hypertension of which was corrected was some IV hydralazine.  On evaluation patient in the emergency department his pulse is 101, blood pressure is 152/95, he remains on the ventilator on SIMV 10/460/5/30%.  He is also on infusions of diltiazem, Protonix, and Keppra.    Hospital Course/Significant  events:      24 Hour Interval History:  No acute events overnight.  Vital signs stable.  Afebrile with T-max 37.8° C. I/O over past 24 hours 2334/2200ml. Remains on ventilator 460/10/5+/30%.  Remains sedated with Precedex at 0.4 mcg/kg/hour.  Also remains on Protonix infusion.  Off of IV Cardizem and back on home p.o. regimen per PEG tube.  Blood cultures on admission no growth at 12:00 p.m..  Remains on vanc and Levaquin.  Spontaneously opens eyes to pain but still does not track.    Past Medical History:   Diagnosis Date    Arthritis     Atrial fibrillation     BPH (benign prostatic hyperplasia)     Cardiac arrest     Coronary artery disease     Cyst, kidney, acquired     Diverticulosis     Hyperlipidemia     Hypertension     MI (myocardial infarction)     Obesity     Steatosis of liver     Stroke        Past Surgical History:   Procedure Laterality Date    A-V CARDIAC PACEMAKER INSERTION Right     CARDIAC CATHETERIZATION      COLONOSCOPY W/ BIOPSIES      CRANIECTOMY Right 12/20/2023    Procedure: CRANIECTOMY;  Surgeon: Artem Can MD;  Location: [facility] OR;  Service: Neurosurgery;  Laterality: Right;    ESOPHAGOGASTRODUODENOSCOPY W/ PEG N/A 1/2/2024    Procedure: PEG;  Surgeon: Tani Day MD;  Location: [facility] ENDOSCOPY;  Service: Gastroenterology;  Laterality: N/A;    excision of colon      TRACHEOSTOMY N/A 12/29/2023    Procedure: CREATION, TRACHEOSTOMY;  Surgeon: Patricia Winslow MD;  Location: [facility] OR;  Service: ENT;  Laterality: N/A;  REQ 1130 //  NEEDS 2 SCRUBS       Social History     Socioeconomic History    Marital status:     Number of children: 9   Occupational History    Occupation: retired   Tobacco Use    Smoking status: Never    Smokeless tobacco: Never   Substance and Sexual Activity    Alcohol use: Not Currently    Drug use: Not Currently    Sexual activity: Not Currently     Partners: Female     Social Determinants of Health     Financial Resource Strain: Low Risk   (10/19/2022)    Overall Financial Resource Strain (CARDIA)     Difficulty of Paying Living Expenses: Not hard at all   Food Insecurity: No Food Insecurity (10/19/2022)    Hunger Vital Sign     Worried About Running Out of Food in the Last Year: Never true     Ran Out of Food in the Last Year: Never true   Transportation Needs: No Transportation Needs (10/19/2022)    PRAPARE - Transportation     Lack of Transportation (Medical): No     Lack of Transportation (Non-Medical): No   Physical Activity: Sufficiently Active (10/19/2022)    Exercise Vital Sign     Days of Exercise per Week: 6 days     Minutes of Exercise per Session: 60 min   Stress: No Stress Concern Present (10/19/2022)    [place] [employer] - Occupational Stress Questionnaire     Feeling of Stress : Not at all   Social Connections: Unknown (10/19/2022)    Social Connection and Isolation Panel [NHANES]     Frequency of Communication with Friends and Family: More than three times a week     Frequency of Social Gatherings with Friends and Family: More than three times a week     Attends Hoahaoism Services: More than 4 times per year     Active Member of Clubs or Organizations: No     Attends Club or Organization Meetings: Never   Housing Stability: Low Risk  (10/19/2022)    Housing Stability Vital Sign     Unable to Pay for Housing in the Last Year: No     Number of Places Lived in the Last Year: 1     Unstable Housing in the Last Year: No           Current Outpatient Medications   Medication Instructions    aspirin 81 mg, Per G Tube, Daily    atorvastatin (LIPITOR) 10 mg, Per G Tube, Daily    carvediloL (COREG) 3.125 mg, Per G Tube, 2 times daily    diltiaZEM (CARDIZEM) 240 mg, Per G Tube, Daily    guaiFENesin 100 mg/5 ml (ROBITUSSIN) 400 mg, Per G Tube, Every 8 hours    losartan (COZAAR) 50 mg, Per G Tube, Daily    polyethylene glycol (GLYCOLAX) 17 g, Per G Tube, Daily PRN    QUEtiapine (SEROQUEL) 25 mg, Per G Tube, 2 times daily        Current Inpatient Medications   atorvastatin  10 mg Per G Tube Daily    carvediloL  3.125 mg Per G Tube BID    diltiaZEM  240 mg Per G Tube Daily    levETIRAcetam (Keppra) IV (PEDS and ADULTS)  1,500 mg Intravenous Q12H    levoFLOXacin  750 mg Intravenous Q24H    losartan  50 mg Per G Tube Daily    mupirocin   Nasal BID    QUEtiapine  25 mg Per G Tube BID    vancomycin (VANCOCIN) IV (PEDS and ADULTS)  1,750 mg Intravenous Q12H       Current Intravenous Infusions   dexmedeTOMIDine (Precedex) infusion (titrating) 0.4 mcg/kg/hr (01/18/24 2318)    dilTIAZem Stopped (01/18/24 0749)    pantoprazole (PROTONIX) IV infusion 8 mg/hr (01/19/24 0442)    propofoL           Review of Systems   Unable to perform ROS: Medical condition          Objective:       Intake/Output Summary (Last 24 hours) at 1/19/2024 0517  Last data filed at 1/18/2024 2318  Gross per 24 hour   Intake 3720.84 ml   Output 2200 ml   Net 1520.84 ml           Vital Signs (Most Recent):  Temp: 99.1 °F (37.3 °C) (01/19/24 0400)  Pulse: 76 (01/19/24 0400)  Resp: 12 (01/19/24 0400)  BP: 115/81 (01/19/24 0200)  SpO2: 99 % (01/19/24 0400)  Body mass index is 37.31 kg/m².  Weight: 117.9 kg (260 lb) Vital Signs (24h Range):  Temp:  [99.1 °F (37.3 °C)-100 °F (37.8 °C)] 99.1 °F (37.3 °C)  Pulse:  [] 76  Resp:  [4-34] 12  SpO2:  [96 %-100 %] 99 %  BP: (111-170)/() 115/81     Physical Exam  Vitals and nursing note reviewed.   Constitutional:       General: He is not in acute distress.     Appearance: He is not toxic-appearing.      Comments: Intubated per trach   HENT:      Head: Normocephalic and atraumatic.   Cardiovascular:      Rate and Rhythm: Normal rate. Rhythm irregular.      Pulses: Normal pulses.      Heart sounds: No murmur heard.     No gallop.   Pulmonary:      Effort: No respiratory distress.      Breath sounds: No stridor. No wheezing, rhonchi or rales.   Abdominal:      General: Abdomen is flat. There is no distension.      Palpations:  Abdomen is soft.   Genitourinary:     Comments: There is a Fernandez and rectal tube present  Musculoskeletal:         General: No swelling or deformity.      Left lower leg: No edema.   Skin:     General: Skin is warm.      Coloration: Skin is not jaundiced.      Findings: No bruising.   Neurological:      Comments:     Patient is essentially unresponsive/noninteractive  Spontaneously opens eyes  Does not track           Lines/Drains/Airways       Peripherally Inserted Central Catheter Line  Duration             PICC Triple Lumen 01/15/24 1424 right basilic 3 days              Drain  Duration                  Gastrostomy/Enterostomy 01/02/24 1230 LUQ 16 days         Fecal Incontinence  01/07/24 0357 12 days         Urethral Catheter 01/14/24 0521 Silicone 4 days              Airway  Duration             Adult Surgical Airway 12/29/23 1229 20 days                    Significant Labs:    Lab Results   Component Value Date    WBC 5.31 01/19/2024    HGB 9.6 (L) 01/19/2024    HCT 31.0 (L) 01/19/2024    MCV 91.2 01/19/2024     01/19/2024           BMP  Lab Results   Component Value Date     01/19/2024    K 3.2 (L) 01/19/2024    CHLORIDE 103 01/19/2024    CO2 29 01/19/2024    BUN 9.3 01/19/2024    CREATININE 0.79 01/19/2024    CALCIUM 8.0 (L) 01/19/2024    AGAP 7.0 01/04/2024    EGFRNONAA 61 04/23/2022         ABG  Recent Labs   Lab 01/18/24  0605   PH 7.530*   PO2 130.0*   PCO2 41.0   HCO3 34.3*   POCBASEDEF 10.60*         Mechanical Ventilation Support:  Vent Mode: VOLUME SIMV (01/19/24 0307)  Set Rate: 10 BPM (01/19/24 0307)  Vt Set: 460 mL (01/19/24 0307)  Pressure Support: 15 cmH20 (01/19/24 0307)  PEEP/CPAP: 5 cmH20 (01/19/24 0307)  Oxygen Concentration (%): 30 (01/19/24 0307)  Peak Airway Pressure: 30 cmH20 (01/19/24 0307)  Total Ve: 9.9 L/m (01/19/24 0307)  F/VT Ratio<105 (RSBI): (!) 24.15 (01/19/24 0301)      Significant Imaging:  I have reviewed the pertinent imaging within the past 24  hours.        Assessment/Plan:     Assessment  Seizure-like activity  Hematemesis  AFib RVR - resolved  CVA s/p right ICA and MCA M3 branch thrombectomy with hemorrhagic conversion s/p craniectomy  Right frontotemporal parietal DCH 12/23/2023  Hypoxic respiratory failure requiring intubation s/p trach  Superficial thrombosis in left cephalic vein seen on DVT ultrasound 12/26/2023  Enterobacter UTI on culture 01/14/2024  Pansensitive Raoultella Ornithinolytica on respiratory culture 01/14/2024  Staph epidermidis bacteremia    Plan  1. Patient remains on mechanical ventilation as he has not able to be weaned  2. Patient currently on Keppra.  Neurology following  3. Seen by GI yesterday afternoon with plans to monitor.  This morning hemoglobin is stable with no significant drop.  May consider resuming home Xarelto if GI okay  4. May consider resuming tube feeds at a trickle if GI okay  5. Continue diltiazem 240 mg, Coreg 3.125 mg b.i.d., and losartan 50 mg  5. Continue IV vancomycin stop date 1/27/24  6. Continue Levaquin with stop date of 01/21/2024    DVT Prophylaxis: SCD  GI Prophylaxis:Protonix     32 minutes of critical care was time spent personally by me on the following activities: development of treatment plan with patient or surrogate and bedside caregivers, discussions with consultants, evaluation of patient's response to treatment, examination of patient, ordering and performing treatments and interventions, ordering and review of laboratory studies, ordering and review of radiographic studies, pulse oximetry, re-evaluation of patient's condition.  This critical care time did not overlap with that of any other provider or involve time for any procedures.     Magdi Rivera DO  Pulmonary Critical Care Medicine  DOS: 01/19/2024

## 2024-01-19 NOTE — NURSING
Nurses Note -- 4 Eyes      1/19/2024   7:52 AM      Skin assessed during: Daily Assessment      [x] No Altered Skin Integrity Present    [x]Prevention Measures Documented      [] Yes- Altered Skin Integrity Present or Discovered   [] LDA Added if Not in Epic (Describe Wound)   [] New Altered Skin Integrity was Present on Admit and Documented in LDA   [] Wound Image Taken    Wound Care Consulted? No    Attending Nurse:  Eleni SALES    Second RN/Staff Member:  Emily sales

## 2024-01-19 NOTE — SUBJECTIVE & OBJECTIVE
Subjective:     Interval History:   Appears more awake today. No significant events or seizure activity overnight or this AM per nursing report.     Current Neurological Medications:     Current Facility-Administered Medications   Medication Dose Route Frequency Provider Last Rate Last Admin    atorvastatin tablet 10 mg  10 mg Per G Tube Daily Magdi Rivera DO   10 mg at 01/19/24 0810    carvediloL tablet 3.125 mg  3.125 mg Per G Tube BID Magdi Rivera DO   3.125 mg at 01/19/24 0810    dexmedetomidine (PRECEDEX) 400mcg/100mL 0.9% NaCL infusion  0-1.4 mcg/kg/hr Intravenous Continuous Sony Corbin MD 5.9 mL/hr at 01/19/24 0525 0.2 mcg/kg/hr at 01/19/24 0525    diltiaZEM 125 mg in dextrose 5 % 125 mL IVPB (ready to mix) (titrating)  0-15 mg/hr Intravenous Continuous Magdi Rivera DO   Stopped at 01/18/24 0749    diltiaZEM tablet 240 mg  240 mg Per G Tube Daily Magdi Rivera DO   240 mg at 01/19/24 0810    hydrALAZINE injection 10 mg  10 mg Intravenous Q4H PRN Magdi Rivera DO        labetaloL injection 10 mg  10 mg Intravenous Q4H PRN Magdi Rivera DO   10 mg at 01/17/24 1824    levETIRAcetam in NaCl (iso-os) IVPB 1,500 mg  1,500 mg Intravenous Q12H Magdi Rivera DO   Stopped at 01/19/24 0916    levoFLOXacin 750 mg/150 mL IVPB 750 mg  750 mg Intravenous Q24H Magdi Rivera DO   Stopped at 01/18/24 1958    losartan tablet 50 mg  50 mg Per G Tube Daily Magdi Rivera DO   50 mg at 01/19/24 0810    metoprolol injection 5 mg  5 mg Intravenous Q5 Min PRN Magdi Rivera DO        mupirocin 2 % ointment   Nasal BID Itz Francisco MD   Given at 01/19/24 0811    pantoprazole injection 40 mg  40 mg Intravenous BID Magdi Rivera DO        polyethylene glycol packet 17 g  17 g Per G Tube Daily PRN Magdi Rivera DO        potassium chloride 20 mEq in 100 mL IVPB (FOR CENTRAL LINE ADMINISTRATION ONLY)  20 mEq Intravenous Q2H Magdi Rivera, DO 50 mL/hr at 01/19/24 0855 20 mEq at 01/19/24 0855     propofol (DIPRIVAN) 10 mg/mL infusion  0-50 mcg/kg/min (Dosing Weight) Intravenous Continuous Sony Corbin MD        QUEtiapine tablet 25 mg  25 mg Per G Tube BID Magdi Rivera DO   25 mg at 01/19/24 0810    rivaroxaban tablet 20 mg  20 mg Per G Tube Daily with dinner Magdi Rivera DO        sodium chloride 0.9% flush 10 mL  10 mL Intravenous PRN Magdi Rivera DO        vancomycin - pharmacy to dose   Intravenous pharmacy to manage frequency Magdi Rivera DO        vancomycin 1.5 g in dextrose 5 % 250 mL IVPB (ready to mix)  1,500 mg Intravenous Q12H Magdi Rivera DO         Facility-Administered Medications Ordered in Other Encounters   Medication Dose Route Frequency Provider Last Rate Last Admin    [MAR Hold - Suspended Admission] acetaminophen tablet 650 mg  650 mg Oral Q4H PRN Leonarda, Jl A, FNP   650 mg at 01/17/24 0639    [MAR Hold - Suspended Admission] albuterol-ipratropium 2.5 mg-0.5 mg/3 mL nebulizer solution 3 mL  3 mL Nebulization Q4H PRN Leonarda, Jl A, FNP        [MAR Hold - Suspended Admission] ALPRAZolam tablet 0.25 mg  0.25 mg Oral TID PRN Leonarda, Jl A, FNP        [MAR Hold - Suspended Admission] aspirin chewable tablet 81 mg  81 mg Per G Tube Daily Leonarda, Jl A, FNP        [MAR Hold - Suspended Admission] atorvastatin tablet 40 mg  40 mg Per G Tube Daily Leonarda, Jl A, FNP        [MAR Hold - Suspended Admission] benzonatate capsule 100 mg  100 mg Oral TID PRN Leonarda, Jl A, FNP        [MAR Hold - Suspended Admission] bisacodyL suppository 10 mg  10 mg Rectal Daily PRN Leonarda, Jl A, FNP        [MAR Hold - Suspended Admission] chlorhexidine 0.12 % solution 15 mL  15 mL Mouth/Throat BID LeonardaJl shi A, FNP   15 mL at 01/17/24 0900    [MAR Hold - Suspended Admission] diltiaZEM tablet 90 mg  90 mg Per G Tube Q6H Su Garcia FNP        [MAR Hold - Suspended Admission] docusate sodium capsule 100 mg  100 mg Oral BID  Leonarda, Jl A, FNP   100 mg at 01/16/24 2140    [MAR Hold - Suspended Admission] hydrALAZINE injection 10 mg  10 mg Intravenous Q4H PRN Leonarda, Jl A, FNP   10 mg at 01/17/24 1107    [MAR Hold - Suspended Admission] hydrOXYzine pamoate capsule 50 mg  50 mg Oral Nightly PRN Leonarda, Jl A, FNP   50 mg at 01/16/24 2141    [MAR Hold - Suspended Admission] labetaloL injection 10 mg  10 mg Intravenous Q4H PRN Leonarda, Jl A, FNP   10 mg at 01/17/24 0727    [MAR Hold - Suspended Admission] levetiracetam 500 mg/5 mL (5 mL) liquid Soln 1,500 mg  1,500 mg Per G Tube BID Leonarda, Jl A, FNP        [MAR Hold - Suspended Admission] levETIRAcetam in NaCl (iso-os) IVPB 500 mg  500 mg Intravenous Once Kevin Sellers MD        [MAR Hold - Suspended Admission] levoFLOXacin 750 mg/150 mL IVPB 750 mg  750 mg Intravenous Q24H Leonarda, Jl A, FNP        [MAR Hold - Suspended Admission] LORazepam injection 2 mg  2 mg Intravenous Q2H Leonarda, Jl A, FNP        [MAR Hold - Suspended Admission] losartan tablet 50 mg  50 mg Per G Tube Daily Leonarda, Jl A, FNP        [MAR Hold - Suspended Admission] metoprolol injection 10 mg  10 mg Intravenous Q2H PRN Leonarda, Jl A, FNP   10 mg at 01/17/24 1110    [MAR Hold - Suspended Admission] metoprolol succinate (TOPROL-XL) 24 hr tablet 200 mg  200 mg Oral BID Leonarda, Jl A, FNP   200 mg at 01/16/24 2141    [MAR Hold - Suspended Admission] niCARdipine 40 mg/200 mL (0.2 mg/mL) infusion  0-15 mg/hr Intravenous Continuous Leonarda, Jl A, FNP 25 mL/hr at 01/17/24 1131 5 mg/hr at 01/17/24 1131    [MAR Hold - Suspended Admission] nitroGLYCERIN SL tablet 0.4 mg  0.4 mg Sublingual Q5 Min PRN Leonarda, Jl A, FNP        [MAR Hold - Suspended Admission] ondansetron disintegrating tablet 4 mg  4 mg Oral Q6H PRN Jl Brower FNP        [MAR Hold - Suspended Admission] ondansetron injection 4 mg  4 mg Intravenous Q6H PRN Jose  TRUMAN Rodriguez   4 mg at 01/17/24 0727    [MAR Hold - Suspended Admission] pantoprazole suspension 40 mg  40 mg Per G Tube Daily Jl Brower FNP        [MAR Hold - Suspended Admission] polyethylene glycol packet 17 g  17 g Oral BID PRN Jl Brower FNP        [MAR Hold - Suspended Admission] potassium chloride SA CR tablet 40 mEq  40 mEq Oral TID Jl Brower FNP        [MAR Hold - Suspended Admission] promethazine tablet 25 mg  25 mg Oral Q6H PRN Jl Brower FNP        [MAR Hold - Suspended Admission] rivaroxaban tablet 20 mg  20 mg Per G Tube Daily with dinner Jl Brower FNP        [MAR Hold - Suspended Admission] spironolactone tablet 50 mg  50 mg Per G Tube Daily Jl Brower FNP        [MAR Hold - Suspended Admission] torsemide tablet 10 mg  10 mg Per G Tube Daily Jl Brower FNP        [MAR Hold - Suspended Admission] vancomycin (VANCOCIN) 1,750 mg in dextrose 5 % (D5W) 500 mL IVPB  1,750 mg Intravenous Q12H Kevin Sellers MD   Stopped at 01/17/24 0115    [MAR Hold - Suspended Admission] vitamin D 1000 units tablet 1,000 Units  1,000 Units Per G Tube Daily Jl Brower FNP           Review of Systems   Unable to perform ROS: Patient nonverbal     Objective:     Vital Signs (Most Recent):  Temp: 99.1 °F (37.3 °C) (01/19/24 0400)  Pulse: 79 (01/19/24 0810)  Resp: 11 (01/19/24 0515)  BP: (!) 148/105 (01/19/24 0810)  SpO2: 98 % (01/19/24 0515) Vital Signs (24h Range):  Temp:  [99.1 °F (37.3 °C)-100 °F (37.8 °C)] 99.1 °F (37.3 °C)  Pulse:  [] 79  Resp:  [4-34] 11  SpO2:  [96 %-100 %] 98 %  BP: (111-159)/() 148/105     Weight: 117.9 kg (260 lb)  Body mass index is 37.31 kg/m².     Physical Exam   GENERAL: NAD, calm, cooperative, lethargic, easy to arouse, trach, on ventilator  MENTAL STATUS: follows commands reliably  SPEECH/LANGUAGE: UTO  CN:  gaze conjugate, visual fields: makes eye contact and tracks examiner  PERRLA-  3/2  Motor: L hemiparesis  Sensory:does not withdraw to painful stimuli to LUE/LLE         Significant Labs:   Recent Lab Results         01/19/24  0909   01/19/24  0217        Albumin/Globulin Ratio   0.7       Albumin   2.3       ALP   51       ALT   24       AST   24       Baso #   0.04       Basophil %   0.8       BILIRUBIN TOTAL   0.5       BUN   9.3       Calcium   8.0       Chloride   103       CO2   29       Creatinine   0.79       eGFR   >60       Eos #   0.44       Eosinophil %   8.3       Globulin, Total   3.4       Glucose   132       Hematocrit   31.0       Hemoglobin   9.6       Immature Grans (Abs)   0.06       Immature Granulocytes   1.1       Lymph #   1.45       LYMPH %   27.3       Magnesium    2.00       MCH   28.2       MCHC   31.0       MCV   91.2       Mono #   0.56       Mono %   10.5       MPV   10.5       Neut #   2.76       Neut %   52.0       nRBC   0.0       Phosphorus Level   3.1       Platelet Count   171       Potassium   3.2       PROTEIN TOTAL   5.7       RBC   3.40       RDW   15.1       Sodium   142       Vancomycin-Trough 22.2         WBC   5.31               Significant Imaging: I have reviewed all pertinent imaging results/findings within the past 24 hours.

## 2024-01-19 NOTE — PROGRESS NOTES
[facility] - [facility]  Neurology  Progress Note    Patient Name: Delio Daniel Jr.  MRN: [id number]  Admission Date: 1/17/2024  Hospital Length of Stay: 2 days  Code Status: Full Code   Attending Provider: Itz Francisco MD  Primary Care Physician: Candy, Primary Doctor   Principal Problem:Seizure-like activity    HPI:   67 y/o M LTAC, Craniotomy in December, Seizure-like activity on 1/17, with coffee ground emesis, Trach/Vent present.   Pt given ativan in ED, with subsequent cessation of twitching.     CT head without on admission revealed interval worsening of cerebral edema to right cerebral hemisphere, hydrocephalus slightly worse, and significant improvement to intraparenchymal hemorrhages per radiology report.    Overview/Hospital Course:  No notes on file        Subjective:     Interval History:   Appears more awake today. No significant events or seizure activity overnight or this AM per nursing report.     Current Neurological Medications:     Current Facility-Administered Medications   Medication Dose Route Frequency Provider Last Rate Last Admin    atorvastatin tablet 10 mg  10 mg Per G Tube Daily Magdi Rivera DO   10 mg at 01/19/24 0810    carvediloL tablet 3.125 mg  3.125 mg Per G Tube BID Magdi Rivera DO   3.125 mg at 01/19/24 0810    dexmedetomidine (PRECEDEX) 400mcg/100mL 0.9% NaCL infusion  0-1.4 mcg/kg/hr Intravenous Continuous Sony Corbin MD 5.9 mL/hr at 01/19/24 0525 0.2 mcg/kg/hr at 01/19/24 0525    diltiaZEM 125 mg in dextrose 5 % 125 mL IVPB (ready to mix) (titrating)  0-15 mg/hr Intravenous Continuous Magdi Rivera DO   Stopped at 01/18/24 0749    diltiaZEM tablet 240 mg  240 mg Per G Tube Daily Magdi Rivera DO   240 mg at 01/19/24 0810    hydrALAZINE injection 10 mg  10 mg Intravenous Q4H PRN Magdi Rivera DO        labetaloL injection 10 mg  10 mg Intravenous Q4H PRN Magdi Rivera DO   10 mg at 01/17/24 1824    levETIRAcetam in NaCl (iso-os) IVPB  1,500 mg  1,500 mg Intravenous Q12H Magdi Rivera DO   Stopped at 01/19/24 0916    levoFLOXacin 750 mg/150 mL IVPB 750 mg  750 mg Intravenous Q24H Magdi Rivera DO   Stopped at 01/18/24 1958    losartan tablet 50 mg  50 mg Per G Tube Daily Magdi Rivera DO   50 mg at 01/19/24 0810    metoprolol injection 5 mg  5 mg Intravenous Q5 Min PRN Magdi Rivera DO        mupirocin 2 % ointment   Nasal BID Itz Francisco MD   Given at 01/19/24 0811    pantoprazole injection 40 mg  40 mg Intravenous BID Magdi Rivera DO        polyethylene glycol packet 17 g  17 g Per G Tube Daily PRN Magdi Rivera DO        potassium chloride 20 mEq in 100 mL IVPB (FOR CENTRAL LINE ADMINISTRATION ONLY)  20 mEq Intravenous Q2H Mgadi Rivera DO 50 mL/hr at 01/19/24 0855 20 mEq at 01/19/24 0855    propofol (DIPRIVAN) 10 mg/mL infusion  0-50 mcg/kg/min (Dosing Weight) Intravenous Continuous Sony Corbin MD        QUEtiapine tablet 25 mg  25 mg Per G Tube BID Magdi Rivera DO   25 mg at 01/19/24 0810    rivaroxaban tablet 20 mg  20 mg Per G Tube Daily with dinner Magdi Rivera DO        sodium chloride 0.9% flush 10 mL  10 mL Intravenous PRN Magdi Rivera DO        vancomycin - pharmacy to dose   Intravenous pharmacy to manage frequency Magdi Rivera DO        vancomycin 1.5 g in dextrose 5 % 250 mL IVPB (ready to mix)  1,500 mg Intravenous Q12H Magdi Rivera DO         Facility-Administered Medications Ordered in Other Encounters   Medication Dose Route Frequency Provider Last Rate Last Admin    [MAR Hold - Suspended Admission] acetaminophen tablet 650 mg  650 mg Oral Q4H PRN Jl Brower FNP   650 mg at 01/17/24 0639    [MAR Hold - Suspended Admission] albuterol-ipratropium 2.5 mg-0.5 mg/3 mL nebulizer solution 3 mL  3 mL Nebulization Q4H PRN Jl Brower FNP        [MAR Hold - Suspended Admission] ALPRAZolam tablet 0.25 mg  0.25 mg Oral TID PRN Jl Brower FNP        [MAR Hold -  Suspended Admission] aspirin chewable tablet 81 mg  81 mg Per G Tube Daily Lawrence Browerothy A, FNP        [MAR Hold - Suspended Admission] atorvastatin tablet 40 mg  40 mg Per G Tube Daily Leonarda, Jl A, FNP        [MAR Hold - Suspended Admission] benzonatate capsule 100 mg  100 mg Oral TID PRN Leonarda, Jl A, FNP        [MAR Hold - Suspended Admission] bisacodyL suppository 10 mg  10 mg Rectal Daily PRN Leonarda, Jl A, FNP        [MAR Hold - Suspended Admission] chlorhexidine 0.12 % solution 15 mL  15 mL Mouth/Throat BID Leonarda, Jl A, FNP   15 mL at 01/17/24 0900    [MAR Hold - Suspended Admission] diltiaZEM tablet 90 mg  90 mg Per G Tube Q6H Su Garcia, ANTONIOP        [MAR Hold - Suspended Admission] docusate sodium capsule 100 mg  100 mg Oral BID Leonarda, Jl A, FNP   100 mg at 01/16/24 2140    [MAR Hold - Suspended Admission] hydrALAZINE injection 10 mg  10 mg Intravenous Q4H PRN Leonarda, Jl A, FNP   10 mg at 01/17/24 1107    [MAR Hold - Suspended Admission] hydrOXYzine pamoate capsule 50 mg  50 mg Oral Nightly PRN Leonarda, Jl A, FNP   50 mg at 01/16/24 2141    [MAR Hold - Suspended Admission] labetaloL injection 10 mg  10 mg Intravenous Q4H PRN Leonarda, Jl A, FNP   10 mg at 01/17/24 0727    [MAR Hold - Suspended Admission] levetiracetam 500 mg/5 mL (5 mL) liquid Soln 1,500 mg  1,500 mg Per G Tube BID Leonarda, Jl A, FNP        [MAR Hold - Suspended Admission] levETIRAcetam in NaCl (iso-os) IVPB 500 mg  500 mg Intravenous Once Kevin Sellers MD        [MAR Hold - Suspended Admission] levoFLOXacin 750 mg/150 mL IVPB 750 mg  750 mg Intravenous Q24H Leonarda, Jl A, FNP        [MAR Hold - Suspended Admission] LORazepam injection 2 mg  2 mg Intravenous Q2H LeonardaJl, FNP        [MAR Hold - Suspended Admission] losartan tablet 50 mg  50 mg Per G Tube Daily Jl Brower, FNP        [MAR Hold - Suspended Admission] metoprolol  injection 10 mg  10 mg Intravenous Q2H PRN Lawrence Browervanessa PUENTE, FNP   10 mg at 01/17/24 1110    [MAR Hold - Suspended Admission] metoprolol succinate (TOPROL-XL) 24 hr tablet 200 mg  200 mg Oral BID LeonardaJl, FNP   200 mg at 01/16/24 2141    [MAR Hold - Suspended Admission] niCARdipine 40 mg/200 mL (0.2 mg/mL) infusion  0-15 mg/hr Intravenous Continuous LeonardaJl FNP 25 mL/hr at 01/17/24 1131 5 mg/hr at 01/17/24 1131    [MAR Hold - Suspended Admission] nitroGLYCERIN SL tablet 0.4 mg  0.4 mg Sublingual Q5 Min PRN Jl Brower FNP        [MAR Hold - Suspended Admission] ondansetron disintegrating tablet 4 mg  4 mg Oral Q6H PRN Jl Brower, FNP        [MAR Hold - Suspended Admission] ondansetron injection 4 mg  4 mg Intravenous Q6H PRN Su Garcia FNP   4 mg at 01/17/24 0727    [MAR Hold - Suspended Admission] pantoprazole suspension 40 mg  40 mg Per G Tube Daily LeonardaJl sheehan, FNP        [MAR Hold - Suspended Admission] polyethylene glycol packet 17 g  17 g Oral BID PRN Jl Brower FNP        [MAR Hold - Suspended Admission] potassium chloride SA CR tablet 40 mEq  40 mEq Oral TID LeonardaJl sheehan A, FNP        [MAR Hold - Suspended Admission] promethazine tablet 25 mg  25 mg Oral Q6H PRN Jl Brower FNP        [MAR Hold - Suspended Admission] rivaroxaban tablet 20 mg  20 mg Per G Tube Daily with dinner Jl Brower, FNP        [MAR Hold - Suspended Admission] spironolactone tablet 50 mg  50 mg Per G Tube Daily LeonardaJl shi, FNP        [MAR Hold - Suspended Admission] torsemide tablet 10 mg  10 mg Per G Tube Daily LeonardaJl, FNP        [MAR Hold - Suspended Admission] vancomycin (VANCOCIN) 1,750 mg in dextrose 5 % (D5W) 500 mL IVPB  1,750 mg Intravenous Q12H Kevin Sellers MD   Stopped at 01/17/24 0115    [MAR Hold - Suspended Admission] vitamin D 1000 units tablet 1,000 Units  1,000 Units Per G Tube Daily  Jl Brower FNP           Review of Systems   Unable to perform ROS: Patient nonverbal     Objective:     Vital Signs (Most Recent):  Temp: 99.1 °F (37.3 °C) (01/19/24 0400)  Pulse: 79 (01/19/24 0810)  Resp: 11 (01/19/24 0515)  BP: (!) 148/105 (01/19/24 0810)  SpO2: 98 % (01/19/24 0515) Vital Signs (24h Range):  Temp:  [99.1 °F (37.3 °C)-100 °F (37.8 °C)] 99.1 °F (37.3 °C)  Pulse:  [] 79  Resp:  [4-34] 11  SpO2:  [96 %-100 %] 98 %  BP: (111-159)/() 148/105     Weight: 117.9 kg (260 lb)  Body mass index is 37.31 kg/m².     Physical Exam   GENERAL: NAD, calm, cooperative, lethargic, easy to arouse, trach, on ventilator  MENTAL STATUS: follows commands reliably  SPEECH/LANGUAGE: UTO  CN:  gaze conjugate, visual fields: makes eye contact and tracks examiner  PERRLA- 3/2  Motor: L hemiparesis  Sensory:does not withdraw to painful stimuli to LUE/LLE         Significant Labs:   Recent Lab Results         01/19/24  0909   01/19/24  0217        Albumin/Globulin Ratio   0.7       Albumin   2.3       ALP   51       ALT   24       AST   24       Baso #   0.04       Basophil %   0.8       BILIRUBIN TOTAL   0.5       BUN   9.3       Calcium   8.0       Chloride   103       CO2   29       Creatinine   0.79       eGFR   >60       Eos #   0.44       Eosinophil %   8.3       Globulin, Total   3.4       Glucose   132       Hematocrit   31.0       Hemoglobin   9.6       Immature Grans (Abs)   0.06       Immature Granulocytes   1.1       Lymph #   1.45       LYMPH %   27.3       Magnesium    2.00       MCH   28.2       MCHC   31.0       MCV   91.2       Mono #   0.56       Mono %   10.5       MPV   10.5       Neut #   2.76       Neut %   52.0       nRBC   0.0       Phosphorus Level   3.1       Platelet Count   171       Potassium   3.2       PROTEIN TOTAL   5.7       RBC   3.40       RDW   15.1       Sodium   142       Vancomycin-Trough 22.2         WBC   5.31               Significant Imaging: I have reviewed all  pertinent imaging results/findings within the past 24 hours.  Assessment and Plan:     * Seizure-like activity  Continue keppra 1500 mg BID   For additional seizure-like activity, load with 150 mg Vimpat followed by 150 mg b.i.d.  Give ativan 2 mg PRN for witnessed seizures  Seizure precautions  Further recommendations per MD            VTE Risk Mitigation (From admission, onward)           Ordered     rivaroxaban tablet 20 mg  With dinner         01/19/24 0850                    LEONARDO Larsen-BC  Inpatient Neurology  [facility] - [facility]

## 2024-01-19 NOTE — PROGRESS NOTES
Pharmacokinetic Assessment Follow Up: IV Vancomycin    Vancomycin serum concentration assessment(s):  The trough level was drawn correctly and can be used to guide therapy at this time. The measurement is above the desired definitive target range of 15 to 20 mcg/mL.    Vancomycin Regimen Plan:  Change regimen to Vancomycin 1500 mg IV every 12 hours with next serum trough concentration measured at 0900 prior to 5th dose on 01/21    Scheduled Administration Times  1000  2200      Drug levels (last 3 results):  Recent Labs   Lab Result Units 01/17/24  1913 01/19/24  0909   Vanc Lvl Random ug/ml 8.2*  --    Vancomycin Trough ug/ml  --  22.2*       Vancomycin Administrations:  vancomycin given in the last 96 hours                     vancomycin 1.75 g in 5 % dextrose 500 mL IVPB (mg) 1,750 mg New Bag 01/19/24 0958     1,750 mg New Bag 01/18/24 2240     1,750 mg New Bag  1008     1,750 mg New Bag 01/17/24 2130    vancomycin (VANCOCIN) 1,750 mg in dextrose 5 % (D5W) 500 mL IVPB (mg) 1,750 mg New Bag 01/16/24 2324                    Pharmacy will continue to follow and monitor vancomycin.    Please contact pharmacy at extension [phone] for questions regarding this assessment.    Thank you for the consult,   Nitza Corbin       Patient brief summary:  Delio Daniel Jr. is a 68 y.o. male initiated on antimicrobial therapy with IV Vancomycin for treatment of bacteremia        Drug Allergies:   Review of patient's allergies indicates:  No Known Allergies    Actual Body Weight:   117.9 kg kg    Renal Function:   Estimated Creatinine Clearance: 116.8 mL/min (based on SCr of 0.79 mg/dL).,     Dialysis Method (if applicable):  N/A    CBC (last 72 hours):  Recent Labs   Lab Result Units 01/17/24  0325 01/17/24  1256 01/17/24  1913 01/18/24  0016 01/19/24  0217   WBC x10(3)/mcL 10.43 10.09  --  8.21 5.31   Hgb g/dL 10.6* 10.0* 9.5* 9.4* 9.6*   Hct % 33.3* 32.0* 30.9* 30.0* 31.0*   Platelet x10(3)/mcL 259 219  --  182 171   Mono % % 8.8  7.4  --  7.9 10.5   Eos % % 1.7 0.9  --  2.4 8.3   Basophil % % 0.6 0.5  --  0.4 0.8       Metabolic Panel (last 72 hours):  Recent Labs   Lab Result Units 01/17/24  0325 01/17/24  1243 01/17/24  1256 01/18/24  0016 01/19/24  0217   Sodium Level mmol/L 144  --  144 143 142   Potassium Level mmol/L 3.2*  --  3.4* 3.2* 3.2*   Chloride mmol/L 103  --  104 105 103   Carbon Dioxide mmol/L 30  --  30 31 29   Glucose Level mg/dL 122*  --  126* 159* 132*   Glucose, UA  Negative Normal  --   --   --    Blood Urea Nitrogen mg/dL 14.5  --  15.1 12.0 9.3   Creatinine mg/dL 0.83  --  0.82 0.78 0.79   Albumin Level g/dL 2.5*  --  2.6* 2.5* 2.3*   Bilirubin Total mg/dL 0.4  --  0.5 0.6 0.5   Alkaline Phosphatase unit/L 54  --  55 51 51   Aspartate Aminotransferase unit/L 28  --  32 30 24   Alanine Aminotransferase unit/L 28  --  28 27 24   Magnesium Level mg/dL 1.90  --   --  1.90 2.00   Phosphorus Level mg/dL 2.8  --   --  2.8 3.1       Microbiologic Results:  Microbiology Results (last 7 days)       Procedure Component Value Units Date/Time    Blood culture #1 **CANNOT BE ORDERED STAT** [[id number]]  (Normal) Collected: 01/17/24 1256    Order Status: Completed Specimen: Blood Updated: 01/18/24 1400     CULTURE, BLOOD ([facility]) No Growth At 24 Hours    Blood culture #2 **CANNOT BE ORDERED STAT** [[id number]]  (Normal) Collected: 01/17/24 1256    Order Status: Completed Specimen: Blood Updated: 01/18/24 1400     CULTURE, BLOOD ([facility]) No Growth At 24 Hours

## 2024-01-19 NOTE — PROGRESS NOTES
"Gastroenterology Progress Note  History reviewed.  Patient independently seen and examined by me.  I agree with the assessment and plan as noted by the PA below.  The patient has had no overt bleeding his hematocrit is stable.  His tube feedings are going well thus far.  His rectal output is light brown.  On physical exam the patient is alert his abdomen is soft and nontender.  Impression is GI bleeding appears to have stopped.  We will sign off.  Please call if needed.  Okay to discharge patient.    HPI:    Still no overt GI bleeding. H&H stable. Tube feeds going. Rectal tube output remains light brown    ROS:    Review of Systems   Unable to perform ROS: Patient nonverbal         Vital Signs:  BP (!) 148/105   Pulse 79   Temp 99.1 °F (37.3 °C) (Oral)   Resp 11   Ht 5' 10" (1.778 m)   Wt 117.9 kg (260 lb)   SpO2 98%   BMI 37.31 kg/m²   Body mass index is 37.31 kg/m².    Physical Exam:    Physical Exam  Vitals and nursing note reviewed.   HENT:      Head: Normocephalic and atraumatic.   Eyes:      General: No scleral icterus.     Extraocular Movements: Extraocular movements intact.   Cardiovascular:      Rate and Rhythm: Normal rate and regular rhythm.      Heart sounds: Normal heart sounds.   Pulmonary:      Effort: Pulmonary effort is normal. No respiratory distress.      Breath sounds: Normal breath sounds.   Abdominal:      General: Abdomen is flat. Bowel sounds are normal. There is no distension.      Palpations: Abdomen is soft.      Tenderness: There is no abdominal tenderness. There is no rebound.      Comments: PEG site c/d/I. No leakage. Abd soft. Rectal tube with brown output   Musculoskeletal:         General: No swelling or deformity. Normal range of motion.      Right lower leg: No edema.      Left lower leg: No edema.   Skin:     General: Skin is warm and dry.   Neurological:      Mental Status: He is alert.         Labs:  Recent Results (from the past 48 hour(s))   COVID/RSV/FLU A&B PCR    " Collection Time: 01/17/24 12:43 PM   Result Value Ref Range    Influenza A PCR Not Detected Not Detected    Influenza B PCR Not Detected Not Detected    Respiratory Syncytial Virus PCR Not Detected Not Detected    SARS-CoV-2 PCR Not Detected Not Detected, Negative   Urinalysis, Reflex to Urine Culture    Collection Time: 01/17/24 12:43 PM    Specimen: Urine   Result Value Ref Range    Color, UA Yellow Yellow, Light-Yellow, Colorless, Straw, Dark-Yellow    Appearance, UA Turbid (A) Clear    Specific Gravity, UA 1.020 1.005 - 1.030    pH, UA 7.5 5.0 - 8.5    Protein, UA 1+ (A) Negative    Glucose, UA Normal Negative, Normal    Ketones, UA Negative Negative    Blood, UA 2+ (A) Negative    Bilirubin, UA Negative Negative    Urobilinogen, UA Normal 0.2, 1.0, Normal    Nitrites, UA Negative Negative    Leukocyte Esterase, UA Negative Negative    WBC, UA 0-5 None Seen, 0-2, 3-5, 0-5 /HPF    Bacteria, UA Occasional (A) None Seen, Trace /HPF    Squamous Epithelial Cells, UA None Seen None Seen /HPF    Mucous, UA Trace (A) None Seen /LPF    Amorphous Crystal, UA Occasional (A) None Seen /uL    RBC, UA 50-99 (A) None Seen, 0-2, 3-5, 0-5 /HPF   Comprehensive metabolic panel    Collection Time: 01/17/24 12:56 PM   Result Value Ref Range    Sodium Level 144 136 - 145 mmol/L    Potassium Level 3.4 (L) 3.5 - 5.1 mmol/L    Chloride 104 98 - 107 mmol/L    Carbon Dioxide 30 23 - 31 mmol/L    Glucose Level 126 (H) 82 - 115 mg/dL    Blood Urea Nitrogen 15.1 8.4 - 25.7 mg/dL    Creatinine 0.82 0.73 - 1.18 mg/dL    Calcium Level Total 8.0 (L) 8.8 - 10.0 mg/dL    Protein Total 6.4 5.8 - 7.6 gm/dL    Albumin Level 2.6 (L) 3.4 - 4.8 g/dL    Globulin 3.8 (H) 2.4 - 3.5 gm/dL    Albumin/Globulin Ratio 0.7 (L) 1.1 - 2.0 ratio    Bilirubin Total 0.5 <=1.5 mg/dL    Alkaline Phosphatase 55 40 - 150 unit/L    Alanine Aminotransferase 28 0 - 55 unit/L    Aspartate Aminotransferase 32 5 - 34 unit/L    eGFR >60 mls/min/1.73/m2   APTT    Collection  Time: 01/17/24 12:56 PM   Result Value Ref Range    PTT 30.4 23.2 - 33.7 seconds   Lactic acid, plasma    Collection Time: 01/17/24 12:56 PM   Result Value Ref Range    Lactic Acid Level 1.0 0.5 - 2.2 mmol/L   Protime-INR    Collection Time: 01/17/24 12:56 PM   Result Value Ref Range    PT 13.9 12.5 - 14.5 seconds    INR 1.1 <=1.3   Blood culture #1 **CANNOT BE ORDERED STAT**    Collection Time: 01/17/24 12:56 PM    Specimen: Blood   Result Value Ref Range    CULTURE, BLOOD ([facility]) No Growth At 24 Hours    Blood culture #2 **CANNOT BE ORDERED STAT**    Collection Time: 01/17/24 12:56 PM    Specimen: Blood   Result Value Ref Range    CULTURE, BLOOD ([facility]) No Growth At 24 Hours    CBC with Differential    Collection Time: 01/17/24 12:56 PM   Result Value Ref Range    WBC 10.09 4.50 - 11.50 x10(3)/mcL    RBC 3.51 (L) 4.70 - 6.10 x10(6)/mcL    Hgb 10.0 (L) 14.0 - 18.0 g/dL    Hct 32.0 (L) 42.0 - 52.0 %    MCV 91.2 80.0 - 94.0 fL    MCH 28.5 27.0 - 31.0 pg    MCHC 31.3 (L) 33.0 - 36.0 g/dL    RDW 15.0 11.5 - 17.0 %    Platelet 219 130 - 400 x10(3)/mcL    MPV 10.1 7.4 - 10.4 fL    Neut % 72.0 %    Lymph % 18.0 %    Mono % 7.4 %    Eos % 0.9 %    Basophil % 0.5 %    Lymph # 1.82 0.6 - 4.6 x10(3)/mcL    Neut # 7.26 2.1 - 9.2 x10(3)/mcL    Mono # 0.75 0.1 - 1.3 x10(3)/mcL    Eos # 0.09 0 - 0.9 x10(3)/mcL    Baso # 0.05 <=0.2 x10(3)/mcL    IG# 0.12 (H) 0 - 0.04 x10(3)/mcL    IG% 1.2 %    NRBC% 0.0 %   Hemoglobin and Hematocrit    Collection Time: 01/17/24  7:13 PM   Result Value Ref Range    Hgb 9.5 (L) 14.0 - 18.0 g/dL    Hct 30.9 (L) 42.0 - 52.0 %   Vancomycin, Random    Collection Time: 01/17/24  7:13 PM   Result Value Ref Range    Vanc Lvl Random 8.2 (L) 15.0 - 20.0 ug/ml   Comprehensive Metabolic Panel    Collection Time: 01/18/24 12:16 AM   Result Value Ref Range    Sodium Level 143 136 - 145 mmol/L    Potassium Level 3.2 (L) 3.5 - 5.1 mmol/L    Chloride 105 98 - 107 mmol/L    Carbon Dioxide 31 23 - 31 mmol/L    Glucose  Level 159 (H) 82 - 115 mg/dL    Blood Urea Nitrogen 12.0 8.4 - 25.7 mg/dL    Creatinine 0.78 0.73 - 1.18 mg/dL    Calcium Level Total 8.1 (L) 8.8 - 10.0 mg/dL    Protein Total 6.1 5.8 - 7.6 gm/dL    Albumin Level 2.5 (L) 3.4 - 4.8 g/dL    Globulin 3.6 (H) 2.4 - 3.5 gm/dL    Albumin/Globulin Ratio 0.7 (L) 1.1 - 2.0 ratio    Bilirubin Total 0.6 <=1.5 mg/dL    Alkaline Phosphatase 51 40 - 150 unit/L    Alanine Aminotransferase 27 0 - 55 unit/L    Aspartate Aminotransferase 30 5 - 34 unit/L    eGFR >60 mls/min/1.73/m2   Magnesium    Collection Time: 01/18/24 12:16 AM   Result Value Ref Range    Magnesium Level 1.90 1.60 - 2.60 mg/dL   Phosphorus    Collection Time: 01/18/24 12:16 AM   Result Value Ref Range    Phosphorus Level 2.8 2.3 - 4.7 mg/dL   CBC with Differential    Collection Time: 01/18/24 12:16 AM   Result Value Ref Range    WBC 8.21 4.50 - 11.50 x10(3)/mcL    RBC 3.32 (L) 4.70 - 6.10 x10(6)/mcL    Hgb 9.4 (L) 14.0 - 18.0 g/dL    Hct 30.0 (L) 42.0 - 52.0 %    MCV 90.4 80.0 - 94.0 fL    MCH 28.3 27.0 - 31.0 pg    MCHC 31.3 (L) 33.0 - 36.0 g/dL    RDW 15.1 11.5 - 17.0 %    Platelet 182 130 - 400 x10(3)/mcL    MPV 10.4 7.4 - 10.4 fL    Neut % 66.6 %    Lymph % 21.7 %    Mono % 7.9 %    Eos % 2.4 %    Basophil % 0.4 %    Lymph # 1.78 0.6 - 4.6 x10(3)/mcL    Neut # 5.47 2.1 - 9.2 x10(3)/mcL    Mono # 0.65 0.1 - 1.3 x10(3)/mcL    Eos # 0.20 0 - 0.9 x10(3)/mcL    Baso # 0.03 <=0.2 x10(3)/mcL    IG# 0.08 (H) 0 - 0.04 x10(3)/mcL    IG% 1.0 %    NRBC% 0.0 %   RT Blood Gas    Collection Time: 01/18/24  1:29 AM   Result Value Ref Range    Sample Type Arterial Blood     Sample site Right Radial Artery     Drawn by smb,lrt     pH, Blood gas 7.570 (H) 7.350 - 7.450    pCO2, Blood gas 38.0 35.0 - 45.0 mmHg    pO2, Blood gas 67.0 (L) 80.0 - 100.0 mmHg    Sodium, Blood Gas 136 (L) 137 - 145 mmol/L    Potassium, Blood Gas 2.8 (L) 3.5 - 5.0 mmol/L    Calcium Level Ionized 1.08 (L) 1.12 - 1.23 mmol/L    TOC2, Blood gas 36.0 mmol/L     Base Excess, Blood gas 11.80 (H) -2.00 - 2.00 mmol/L    sO2, Blood gas 95.6 %    HCO3, Blood gas 34.8 (H) 22.0 - 26.0 mmol/L    THb, Blood gas 9.4 (L) 12 - 16 g/dL    O2 Hb, Blood Gas 93.9 (L) 94.0 - 97.0 %    CO Hgb 1.5 0.5 - 1.5 %    Met Hgb 0.8 0.4 - 1.5 %    Allens Test Yes     MODE SIMV     Oxygen Device, Blood gas Ventilator     FIO2, Blood gas 30 %    Mech Vt 460 ml    Mech RR 10 b/min    PEEP 5.0 cmH2O    PS 15.0 cmH2O   RT Blood Gas    Collection Time: 01/18/24  6:05 AM   Result Value Ref Range    Sample Type Arterial Blood     Sample site Left Radial Artery     Drawn by sd rrt     pH, Blood gas 7.530 (H) 7.350 - 7.450    pCO2, Blood gas 41.0 35.0 - 45.0 mmHg    pO2, Blood gas 130.0 (H) 80.0 - 100.0 mmHg    Sodium, Blood Gas 138 137 - 145 mmol/L    Potassium, Blood Gas 2.8 (L) 3.5 - 5.0 mmol/L    Calcium Level Ionized 1.08 (L) 1.12 - 1.23 mmol/L    TOC2, Blood gas 35.6 mmol/L    Base Excess, Blood gas 10.60 (H) -2.00 - 2.00 mmol/L    sO2, Blood gas 99.2 %    HCO3, Blood gas 34.3 (H) 22.0 - 26.0 mmol/L    THb, Blood gas 9.4 (L) 12 - 16 g/dL    O2 Hb, Blood Gas 96.8 94.0 - 97.0 %    CO Hgb 1.4 0.5 - 1.5 %    Met Hgb 0.7 0.4 - 1.5 %    Allens Test Yes     MODE SIMV     Oxygen Device, Blood gas Ventilator     FIO2, Blood gas 40 %    Mech Vt 460 ml    Mech RR 10 b/min    PEEP 5.0 cmH2O    PS 15.0 cmH2O   Comprehensive Metabolic Panel    Collection Time: 01/19/24  2:17 AM   Result Value Ref Range    Sodium Level 142 136 - 145 mmol/L    Potassium Level 3.2 (L) 3.5 - 5.1 mmol/L    Chloride 103 98 - 107 mmol/L    Carbon Dioxide 29 23 - 31 mmol/L    Glucose Level 132 (H) 82 - 115 mg/dL    Blood Urea Nitrogen 9.3 8.4 - 25.7 mg/dL    Creatinine 0.79 0.73 - 1.18 mg/dL    Calcium Level Total 8.0 (L) 8.8 - 10.0 mg/dL    Protein Total 5.7 (L) 5.8 - 7.6 gm/dL    Albumin Level 2.3 (L) 3.4 - 4.8 g/dL    Globulin 3.4 2.4 - 3.5 gm/dL    Albumin/Globulin Ratio 0.7 (L) 1.1 - 2.0 ratio    Bilirubin Total 0.5 <=1.5 mg/dL     Alkaline Phosphatase 51 40 - 150 unit/L    Alanine Aminotransferase 24 0 - 55 unit/L    Aspartate Aminotransferase 24 5 - 34 unit/L    eGFR >60 mls/min/1.73/m2   Magnesium    Collection Time: 01/19/24  2:17 AM   Result Value Ref Range    Magnesium Level 2.00 1.60 - 2.60 mg/dL   Phosphorus    Collection Time: 01/19/24  2:17 AM   Result Value Ref Range    Phosphorus Level 3.1 2.3 - 4.7 mg/dL   CBC with Differential    Collection Time: 01/19/24  2:17 AM   Result Value Ref Range    WBC 5.31 4.50 - 11.50 x10(3)/mcL    RBC 3.40 (L) 4.70 - 6.10 x10(6)/mcL    Hgb 9.6 (L) 14.0 - 18.0 g/dL    Hct 31.0 (L) 42.0 - 52.0 %    MCV 91.2 80.0 - 94.0 fL    MCH 28.2 27.0 - 31.0 pg    MCHC 31.0 (L) 33.0 - 36.0 g/dL    RDW 15.1 11.5 - 17.0 %    Platelet 171 130 - 400 x10(3)/mcL    MPV 10.5 (H) 7.4 - 10.4 fL    Neut % 52.0 %    Lymph % 27.3 %    Mono % 10.5 %    Eos % 8.3 %    Basophil % 0.8 %    Lymph # 1.45 0.6 - 4.6 x10(3)/mcL    Neut # 2.76 2.1 - 9.2 x10(3)/mcL    Mono # 0.56 0.1 - 1.3 x10(3)/mcL    Eos # 0.44 0 - 0.9 x10(3)/mcL    Baso # 0.04 <=0.2 x10(3)/mcL    IG# 0.06 (H) 0 - 0.04 x10(3)/mcL    IG% 1.1 %    NRBC% 0.0 %   VANCOMYCIN, TROUGH    Collection Time: 01/19/24  9:09 AM   Result Value Ref Range    Vancomycin Trough 22.2 (H) 15.0 - 20.0 ug/ml         Assessment/Plan:      68 y.o. male w/ pmhx of Dr. Marielos Day (known to Dr. 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  Hospital course complicated by superficial thrombus and left cephalic vein, AFib RVR, acute hypoxemic respiratory failure requiring intubation.  GI was consulted for PEG placement. EGD/PEG procedure was completed  1/2/24, revealing relatively unremarkable exam, and a 24 FR peg was placed. The patient was discharged to [facility] 1/16/24 and transferred back to [facility] 1/17/24 for seizurelike activity and reported coffee ground emesis.        reported coffee ground emesis 1/17/24 - none since admisison  - event unwitnessed by inpatient staff. hgb remains baseline  - no bleeding since admission. No evidence of bloody PEG or rectal tube output  - continue PPI daily on d/c as prophylaxis      2. CVA s/p thrombectomy w/ hemorrhagic conversion s/p craniectomy  3. Resp failure requiring trach  4. DVT   5. Bacteremia  - UTI and also respiratory cultures positive       - no indication for repeat EGD.  - continue tube feeds per nutrition recs  - ok to resume anticoags  - PPI daily  - ok to d/c from GI standpoint        TRUE MoctezumaC  [facility], [facility]

## 2024-01-19 NOTE — NURSING
Nurses Note -- 4 Eyes      1/19/2024   4:13 AM      Skin assessed during: Daily Assessment      [x] No Altered Skin Integrity Present    [x]Prevention Measures Documented      [] Yes- Altered Skin Integrity Present or Discovered   [] LDA Added if Not in Epic (Describe Wound)   [] New Altered Skin Integrity was Present on Admit and Documented in LDA   [] Wound Image Taken    Wound Care Consulted? No    Attending Nurse:  Alexsander Johnson RN/Staff Member:  Woody SALES

## 2024-01-20 LAB
ALBUMIN SERPL-MCNC: 2.4 G/DL (ref 3.4–4.8)
ALBUMIN/GLOB SERPL: 0.7 RATIO (ref 1.1–2)
ALP SERPL-CCNC: 54 UNIT/L (ref 40–150)
ALT SERPL-CCNC: 23 UNIT/L (ref 0–55)
AST SERPL-CCNC: 22 UNIT/L (ref 5–34)
BASOPHILS # BLD AUTO: 0.03 X10(3)/MCL
BASOPHILS NFR BLD AUTO: 0.5 %
BILIRUB SERPL-MCNC: 0.5 MG/DL
BUN SERPL-MCNC: 9.7 MG/DL (ref 8.4–25.7)
CALCIUM SERPL-MCNC: 7.9 MG/DL (ref 8.8–10)
CHLORIDE SERPL-SCNC: 103 MMOL/L (ref 98–107)
CO2 SERPL-SCNC: 30 MMOL/L (ref 23–31)
CREAT SERPL-MCNC: 0.8 MG/DL (ref 0.73–1.18)
EOSINOPHIL # BLD AUTO: 0.47 X10(3)/MCL (ref 0–0.9)
EOSINOPHIL NFR BLD AUTO: 7.4 %
ERYTHROCYTE [DISTWIDTH] IN BLOOD BY AUTOMATED COUNT: 14.9 % (ref 11.5–17)
GFR SERPLBLD CREATININE-BSD FMLA CKD-EPI: >60 MLS/MIN/1.73/M2
GLOBULIN SER-MCNC: 3.4 GM/DL (ref 2.4–3.5)
GLUCOSE SERPL-MCNC: 135 MG/DL (ref 82–115)
HCT VFR BLD AUTO: 31.9 % (ref 42–52)
HGB BLD-MCNC: 10 G/DL (ref 14–18)
IMM GRANULOCYTES # BLD AUTO: 0.07 X10(3)/MCL (ref 0–0.04)
IMM GRANULOCYTES NFR BLD AUTO: 1.1 %
LYMPHOCYTES # BLD AUTO: 1.44 X10(3)/MCL (ref 0.6–4.6)
LYMPHOCYTES NFR BLD AUTO: 22.6 %
MAGNESIUM SERPL-MCNC: 1.9 MG/DL (ref 1.6–2.6)
MCH RBC QN AUTO: 28.2 PG (ref 27–31)
MCHC RBC AUTO-ENTMCNC: 31.3 G/DL (ref 33–36)
MCV RBC AUTO: 89.9 FL (ref 80–94)
MONOCYTES # BLD AUTO: 0.55 X10(3)/MCL (ref 0.1–1.3)
MONOCYTES NFR BLD AUTO: 8.6 %
NEUTROPHILS # BLD AUTO: 3.8 X10(3)/MCL (ref 2.1–9.2)
NEUTROPHILS NFR BLD AUTO: 59.8 %
NRBC BLD AUTO-RTO: 0 %
PHOSPHATE SERPL-MCNC: 2.9 MG/DL (ref 2.3–4.7)
PLATELET # BLD AUTO: 187 X10(3)/MCL (ref 130–400)
PMV BLD AUTO: 10.5 FL (ref 7.4–10.4)
POCT GLUCOSE: 102 MG/DL (ref 70–110)
POTASSIUM SERPL-SCNC: 3.2 MMOL/L (ref 3.5–5.1)
PROT SERPL-MCNC: 5.8 GM/DL (ref 5.8–7.6)
RBC # BLD AUTO: 3.55 X10(6)/MCL (ref 4.7–6.1)
SODIUM SERPL-SCNC: 142 MMOL/L (ref 136–145)
WBC # SPEC AUTO: 6.36 X10(3)/MCL (ref 4.5–11.5)

## 2024-01-20 PROCEDURE — 25000003 PHARM REV CODE 250

## 2024-01-20 PROCEDURE — 99900035 HC TECH TIME PER 15 MIN (STAT)

## 2024-01-20 PROCEDURE — 99900026 HC AIRWAY MAINTENANCE (STAT)

## 2024-01-20 PROCEDURE — 63600175 PHARM REV CODE 636 W HCPCS: Performed by: INTERNAL MEDICINE

## 2024-01-20 PROCEDURE — 94761 N-INVAS EAR/PLS OXIMETRY MLT: CPT

## 2024-01-20 PROCEDURE — 20000000 HC ICU ROOM

## 2024-01-20 PROCEDURE — 25000003 PHARM REV CODE 250: Performed by: INTERNAL MEDICINE

## 2024-01-20 PROCEDURE — C9113 INJ PANTOPRAZOLE SODIUM, VIA: HCPCS | Performed by: INTERNAL MEDICINE

## 2024-01-20 PROCEDURE — 83735 ASSAY OF MAGNESIUM: CPT | Performed by: INTERNAL MEDICINE

## 2024-01-20 PROCEDURE — 94003 VENT MGMT INPAT SUBQ DAY: CPT

## 2024-01-20 PROCEDURE — 80053 COMPREHEN METABOLIC PANEL: CPT | Performed by: INTERNAL MEDICINE

## 2024-01-20 PROCEDURE — 84100 ASSAY OF PHOSPHORUS: CPT | Performed by: INTERNAL MEDICINE

## 2024-01-20 PROCEDURE — 85025 COMPLETE CBC W/AUTO DIFF WBC: CPT | Performed by: INTERNAL MEDICINE

## 2024-01-20 PROCEDURE — 27100171 HC OXYGEN HIGH FLOW UP TO 24 HOURS

## 2024-01-20 RX ORDER — MAGNESIUM SULFATE 1 G/100ML
1 INJECTION INTRAVENOUS ONCE
Status: COMPLETED | OUTPATIENT
Start: 2024-01-20 | End: 2024-01-20

## 2024-01-20 RX ORDER — POTASSIUM CHLORIDE 14.9 MG/ML
20 INJECTION INTRAVENOUS
Status: COMPLETED | OUTPATIENT
Start: 2024-01-20 | End: 2024-01-20

## 2024-01-20 RX ADMIN — LEVETIRACETAM INJECTION 1500 MG: 15 INJECTION INTRAVENOUS at 08:01

## 2024-01-20 RX ADMIN — QUETIAPINE FUMARATE 25 MG: 25 TABLET ORAL at 08:01

## 2024-01-20 RX ADMIN — CARVEDILOL 3.12 MG: 3.12 TABLET, FILM COATED ORAL at 08:01

## 2024-01-20 RX ADMIN — ATORVASTATIN CALCIUM 10 MG: 10 TABLET, FILM COATED ORAL at 08:01

## 2024-01-20 RX ADMIN — DEXMEDETOMIDINE HYDROCHLORIDE 0.2 MCG/KG/HR: 400 INJECTION INTRAVENOUS at 07:01

## 2024-01-20 RX ADMIN — LOSARTAN POTASSIUM 50 MG: 50 TABLET, FILM COATED ORAL at 08:01

## 2024-01-20 RX ADMIN — RIVAROXABAN 20 MG: 10 TABLET, FILM COATED ORAL at 05:01

## 2024-01-20 RX ADMIN — VANCOMYCIN HYDROCHLORIDE 1500 MG: 1.5 INJECTION, POWDER, LYOPHILIZED, FOR SOLUTION INTRAVENOUS at 09:01

## 2024-01-20 RX ADMIN — POTASSIUM CHLORIDE 20 MEQ: 14.9 INJECTION, SOLUTION INTRAVENOUS at 08:01

## 2024-01-20 RX ADMIN — POTASSIUM CHLORIDE 20 MEQ: 14.9 INJECTION, SOLUTION INTRAVENOUS at 05:01

## 2024-01-20 RX ADMIN — MUPIROCIN: 20 OINTMENT TOPICAL at 08:01

## 2024-01-20 RX ADMIN — VANCOMYCIN HYDROCHLORIDE 1500 MG: 1.5 INJECTION, POWDER, LYOPHILIZED, FOR SOLUTION INTRAVENOUS at 10:01

## 2024-01-20 RX ADMIN — DILTIAZEM HYDROCHLORIDE 240 MG: 60 TABLET, FILM COATED ORAL at 08:01

## 2024-01-20 RX ADMIN — PANTOPRAZOLE SODIUM 40 MG: 40 INJECTION, POWDER, FOR SOLUTION INTRAVENOUS at 08:01

## 2024-01-20 RX ADMIN — POTASSIUM CHLORIDE 20 MEQ: 14.9 INJECTION, SOLUTION INTRAVENOUS at 09:01

## 2024-01-20 RX ADMIN — DEXMEDETOMIDINE HYDROCHLORIDE 0.2 MCG/KG/HR: 400 INJECTION INTRAVENOUS at 05:01

## 2024-01-20 RX ADMIN — MAGNESIUM SULFATE IN DEXTROSE 1 G: 10 INJECTION, SOLUTION INTRAVENOUS at 05:01

## 2024-01-20 RX ADMIN — LEVOFLOXACIN 750 MG: 750 INJECTION, SOLUTION INTRAVENOUS at 05:01

## 2024-01-20 NOTE — NURSING
Nurses Note -- 4 Eyes      1/20/2024   4:42 PM      Skin assessed during: Daily Assessment      [x] No Altered Skin Integrity Present    [x]Prevention Measures Documented      [] Yes- Altered Skin Integrity Present or Discovered   [] LDA Added if Not in Epic (Describe Wound)   [] New Altered Skin Integrity was Present on Admit and Documented in LDA   [] Wound Image Taken    Wound Care Consulted? No    Attending Nurse:  Jhonny Johnson RN/Staff Member:  Kiera Henry RN            Consent (Marginal Mandibular)/Introductory Paragraph: The rationale for Mohs was explained to the patient and consent was obtained. The risks, benefits and alternatives to therapy were discussed in detail. Specifically, the risks of damage to the marginal mandibular branch of the facial nerve, infection, scarring, bleeding, prolonged wound healing, incomplete removal, allergy to anesthesia, and recurrence were addressed. Prior to the procedure, the treatment site was clearly identified and confirmed by the patient. All components of Universal Protocol/PAUSE Rule completed.

## 2024-01-20 NOTE — PROGRESS NOTES
[facility] - Emergency Dept  Pulmonary Critical Care Note    Patient Name: Delio Daniel Jr.  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  Patient was recently transferred soon 01/16/2024 but presents to the emergency department this afternoon with complaints of seizure-like activity and coffee-ground emesis.  Patient is unable to provide any history himself therefore HPI primarily obtained from chart review.  Patient was seen by Dr. Dewitt this morning at the [facility] and per his note this morning patient did have seizure-like activity that was abated with Keppra.  It is also mentioned that patient did have hematemesis this morning but this can not be quantified but per Dr. Dominguez note patient was having projectile vomiting however this was un quantified.  Since this event, the patient's tube feedings have been placed on hold.  Furthermore patient did also apparently have some tachycardia post seizure and some hypertension of which was corrected was some IV hydralazine.  On evaluation patient in the emergency department his pulse is 101, blood pressure is 152/95, he remains on the ventilator on SIMV 10/460/5/30%.  He is also on infusions of diltiazem, Protonix, and Keppra.    Hospital Course/Significant  events:      24 Hour Interval History:  No acute events overnight.  Vital signs stable.  Afebrile with T-max 37.3° C. I/O over past 24 hours 1965/1500 ml. Remains on ventilator 460/10/5+/30%.  Remains sedated with Precedex at 0.2 mcg/kg/hour.  Blood cultures still without growth at 48 hours.  Remains on vanc and Levaquin for previous infections.    Past Medical History:   Diagnosis Date    Arthritis     Atrial fibrillation     BPH (benign prostatic hyperplasia)     Cardiac arrest     Coronary artery disease     Cyst, kidney, acquired     Diverticulosis     Hyperlipidemia     Hypertension     MI (myocardial infarction)     Obesity     Steatosis of liver     Stroke        Past Surgical History:   Procedure Laterality Date    A-V CARDIAC PACEMAKER INSERTION Right     CARDIAC CATHETERIZATION      COLONOSCOPY W/ BIOPSIES      CRANIECTOMY Right 12/20/2023    Procedure: CRANIECTOMY;  Surgeon: Artem Can MD;  Location: [facility] OR;  Service: Neurosurgery;  Laterality: Right;    ESOPHAGOGASTRODUODENOSCOPY W/ PEG N/A 1/2/2024    Procedure: PEG;  Surgeon: Tani Day MD;  Location: [facility] ENDOSCOPY;  Service: Gastroenterology;  Laterality: N/A;    excision of colon      TRACHEOSTOMY N/A 12/29/2023    Procedure: CREATION, TRACHEOSTOMY;  Surgeon: Patricia Winslow MD;  Location: [facility] OR;  Service: ENT;  Laterality: N/A;  REQ 1130 //  NEEDS 2 SCRUBS       Social History     Socioeconomic History    Marital status:     Number of children: 9   Occupational History    Occupation: retired   Tobacco Use    Smoking status: Never    Smokeless tobacco: Never   Substance and Sexual Activity    Alcohol use: Not Currently    Drug use: Not Currently    Sexual activity: Not Currently     Partners: Female     Social Determinants of Health     Financial Resource Strain: Low Risk  (10/19/2022)    Overall Financial Resource Strain (CARDIA)     Difficulty of Paying Living Expenses: Not hard at all   Food Insecurity: No Food  Insecurity (10/19/2022)    Hunger Vital Sign     Worried About Running Out of Food in the Last Year: Never true     Ran Out of Food in the Last Year: Never true   Transportation Needs: No Transportation Needs (10/19/2022)    PRAPARE - Transportation     Lack of Transportation (Medical): No     Lack of Transportation (Non-Medical): No   Physical Activity: Sufficiently Active (10/19/2022)    Exercise Vital Sign     Days of Exercise per Week: 6 days     Minutes of Exercise per Session: 60 min   Stress: No Stress Concern Present (10/19/2022)    [employer] [place] [employer] - Occupational Stress Questionnaire     Feeling of Stress : Not at all   Social Connections: Unknown (10/19/2022)    Social Connection and Isolation Panel [NHANES]     Frequency of Communication with Friends and Family: More than three times a week     Frequency of Social Gatherings with Friends and Family: More than three times a week     Attends Gnosticist Services: More than 4 times per year     Active Member of Clubs or Organizations: No     Attends Club or Organization Meetings: Never   Housing Stability: Low Risk  (10/19/2022)    Housing Stability Vital Sign     Unable to Pay for Housing in the Last Year: No     Number of Places Lived in the Last Year: 1     Unstable Housing in the Last Year: No           Current Outpatient Medications   Medication Instructions    aspirin 81 mg, Per G Tube, Daily    atorvastatin (LIPITOR) 10 mg, Per G Tube, Daily    carvediloL (COREG) 3.125 mg, Per G Tube, 2 times daily    diltiaZEM (CARDIZEM) 240 mg, Per G Tube, Daily    guaiFENesin 100 mg/5 ml (ROBITUSSIN) 400 mg, Per G Tube, Every 8 hours    losartan (COZAAR) 50 mg, Per G Tube, Daily    polyethylene glycol (GLYCOLAX) 17 g, Per G Tube, Daily PRN    QUEtiapine (SEROQUEL) 25 mg, Per G Tube, 2 times daily       Current Inpatient Medications   atorvastatin  10 mg Per G Tube Daily    carvediloL  3.125 mg Per G Tube BID    diltiaZEM  240 mg Per G Tube  Daily    levETIRAcetam (Keppra) IV (PEDS and ADULTS)  1,500 mg Intravenous Q12H    levoFLOXacin  750 mg Intravenous Q24H    losartan  50 mg Per G Tube Daily    magnesium sulfate IVPB  1 g Intravenous Once    mupirocin   Nasal BID    pantoprazole  40 mg Intravenous BID    potassium chloride in water  20 mEq Intravenous Q1H    QUEtiapine  25 mg Per G Tube BID    rivaroxaban  20 mg Per G Tube Daily with dinner    vancomycin (VANCOCIN) IV (PEDS and ADULTS)  1,500 mg Intravenous Q12H       Current Intravenous Infusions   dexmedeTOMIDine (Precedex) infusion (titrating) 0.2 mcg/kg/hr (01/19/24 1828)    propofoL           Review of Systems   Unable to perform ROS: Medical condition          Objective:       Intake/Output Summary (Last 24 hours) at 1/20/2024 0430  Last data filed at 1/19/2024 1850  Gross per 24 hour   Intake 3049.28 ml   Output 2400 ml   Net 649.28 ml           Vital Signs (Most Recent):  Temp: 98.8 °F (37.1 °C) (01/20/24 0000)  Pulse: 81 (01/20/24 0400)  Resp: (!) 21 (01/20/24 0400)  BP: (!) 146/100 (01/20/24 0400)  SpO2: 99 % (01/20/24 0400)  Body mass index is 37.31 kg/m².  Weight: 117.9 kg (260 lb) Vital Signs (24h Range):  Temp:  [98.8 °F (37.1 °C)-99.2 °F (37.3 °C)] 98.8 °F (37.1 °C)  Pulse:  [] 81  Resp:  [9-23] 21  SpO2:  [97 %-100 %] 99 %  BP: (125-177)/() 146/100     Physical Exam  Vitals and nursing note reviewed.   Constitutional:       General: He is not in acute distress.     Appearance: He is not toxic-appearing.      Comments: Intubated per trach   HENT:      Head: Normocephalic and atraumatic.   Cardiovascular:      Rate and Rhythm: Normal rate. Rhythm irregular.      Pulses: Normal pulses.      Heart sounds: No murmur heard.     No gallop.   Pulmonary:      Effort: No respiratory distress.      Breath sounds: No stridor. No wheezing, rhonchi or rales.   Abdominal:      General: Abdomen is flat. There is no distension.      Palpations: Abdomen is soft.   Genitourinary:      Comments: There is a Fernandez and rectal tube present  Musculoskeletal:         General: No swelling or deformity.      Left lower leg: No edema.   Skin:     General: Skin is warm.      Coloration: Skin is not jaundiced.      Findings: No bruising.   Neurological:      Comments:     Patient is essentially unresponsive/noninteractive  Spontaneously opens eyes  Does not track           Lines/Drains/Airways       Peripherally Inserted Central Catheter Line  Duration             PICC Triple Lumen 01/15/24 1424 right basilic 4 days              Drain  Duration                  Gastrostomy/Enterostomy 01/02/24 1230 LUQ 17 days         Fecal Incontinence  01/07/24 0357 13 days         Urethral Catheter 01/14/24 0521 Silicone 5 days              Airway  Duration             Adult Surgical Airway 12/29/23 1229 21 days                    Significant Labs:    Lab Results   Component Value Date    WBC 6.36 01/20/2024    HGB 10.0 (L) 01/20/2024    HCT 31.9 (L) 01/20/2024    MCV 89.9 01/20/2024     01/20/2024           BMP  Lab Results   Component Value Date     01/20/2024    K 3.2 (L) 01/20/2024    CHLORIDE 103 01/20/2024    CO2 30 01/20/2024    BUN 9.7 01/20/2024    CREATININE 0.80 01/20/2024    CALCIUM 7.9 (L) 01/20/2024    AGAP 7.0 01/04/2024    EGFRNONAA 61 04/23/2022         ABG  Recent Labs   Lab 01/18/24  0605   PH 7.530*   PO2 130.0*   PCO2 41.0   HCO3 34.3*   POCBASEDEF 10.60*         Mechanical Ventilation Support:  Vent Mode: SIMV (01/20/24 0057)  Set Rate: 10 BPM (01/20/24 0057)  Vt Set: 460 mL (01/20/24 0057)  Pressure Support: 15 cmH20 (01/20/24 0057)  PEEP/CPAP: 5 cmH20 (01/20/24 0057)  Oxygen Concentration (%): 30 (01/20/24 0057)  Peak Airway Pressure: 21 cmH20 (01/20/24 0057)  Total Ve: 9.8 L/m (01/20/24 0057)  F/VT Ratio<105 (RSBI): (!) 39.13 (01/20/24 0057)      Significant Imaging:  I have reviewed the pertinent imaging within the past 24 hours.        Assessment/Plan:      Assessment  Seizure-like activity - resolved  Hematemesis - resolved  AFib RVR - resolved  CVA s/p right ICA and MCA M3 branch thrombectomy with hemorrhagic conversion s/p craniectomy  Right frontotemporal parietal DCH 12/23/2023  Hypoxic respiratory failure requiring intubation s/p trach  Superficial thrombosis in left cephalic vein seen on DVT ultrasound 12/26/2023  Enterobacter UTI on culture 01/14/2024  Pansensitive Raoultella Ornithinolytica on respiratory culture 01/14/2024  Staph epidermidis bacteremia    Plan  1. Patient remains on mechanical ventilation as he has not able to be weaned  2. Restarted Xarelto yesterday without drop in H&H which is stable.  5. Continue diltiazem 240 mg, Coreg 3.125 mg b.i.d., and losartan 50 mg  5. Continue IV vancomycin stop date 1/27/24  6. Continue Levaquin with stop date of 01/21/2024  7. Likely discharge back to LTAC soon    DVT Prophylaxis:  Xarelto  GI Prophylaxis:Protonix     32 minutes of critical care was time spent personally by me on the following activities: development of treatment plan with patient or surrogate and bedside caregivers, discussions with consultants, evaluation of patient's response to treatment, examination of patient, ordering and performing treatments and interventions, ordering and review of laboratory studies, ordering and review of radiographic studies, pulse oximetry, re-evaluation of patient's condition.  This critical care time did not overlap with that of any other provider or involve time for any procedures.     Magdi Rivera DO  Pulmonary Critical Care Medicine  DOS: 01/20/2024

## 2024-01-21 LAB
ALBUMIN SERPL-MCNC: 2.3 G/DL (ref 3.4–4.8)
ALBUMIN/GLOB SERPL: 0.6 RATIO (ref 1.1–2)
ALP SERPL-CCNC: 54 UNIT/L (ref 40–150)
ALT SERPL-CCNC: 24 UNIT/L (ref 0–55)
AST SERPL-CCNC: 23 UNIT/L (ref 5–34)
BASOPHILS # BLD AUTO: 0.03 X10(3)/MCL
BASOPHILS NFR BLD AUTO: 0.6 %
BILIRUB SERPL-MCNC: 0.6 MG/DL
BUN SERPL-MCNC: 8 MG/DL (ref 8.4–25.7)
CALCIUM SERPL-MCNC: 8.3 MG/DL (ref 8.8–10)
CHLORIDE SERPL-SCNC: 105 MMOL/L (ref 98–107)
CO2 SERPL-SCNC: 29 MMOL/L (ref 23–31)
CREAT SERPL-MCNC: 0.78 MG/DL (ref 0.73–1.18)
EOSINOPHIL # BLD AUTO: 0.2 X10(3)/MCL (ref 0–0.9)
EOSINOPHIL NFR BLD AUTO: 3.8 %
ERYTHROCYTE [DISTWIDTH] IN BLOOD BY AUTOMATED COUNT: 15 % (ref 11.5–17)
GFR SERPLBLD CREATININE-BSD FMLA CKD-EPI: >60 MLS/MIN/1.73/M2
GLOBULIN SER-MCNC: 3.8 GM/DL (ref 2.4–3.5)
GLUCOSE SERPL-MCNC: 119 MG/DL (ref 82–115)
HCT VFR BLD AUTO: 32.4 % (ref 42–52)
HGB BLD-MCNC: 9.9 G/DL (ref 14–18)
IMM GRANULOCYTES # BLD AUTO: 0.04 X10(3)/MCL (ref 0–0.04)
IMM GRANULOCYTES NFR BLD AUTO: 0.8 %
LYMPHOCYTES # BLD AUTO: 1.4 X10(3)/MCL (ref 0.6–4.6)
LYMPHOCYTES NFR BLD AUTO: 26.3 %
MAGNESIUM SERPL-MCNC: 2 MG/DL (ref 1.6–2.6)
MCH RBC QN AUTO: 28.1 PG (ref 27–31)
MCHC RBC AUTO-ENTMCNC: 30.6 G/DL (ref 33–36)
MCV RBC AUTO: 92 FL (ref 80–94)
MONOCYTES # BLD AUTO: 0.59 X10(3)/MCL (ref 0.1–1.3)
MONOCYTES NFR BLD AUTO: 11.1 %
NEUTROPHILS # BLD AUTO: 3.07 X10(3)/MCL (ref 2.1–9.2)
NEUTROPHILS NFR BLD AUTO: 57.4 %
NRBC BLD AUTO-RTO: 0 %
PHOSPHATE SERPL-MCNC: 3.4 MG/DL (ref 2.3–4.7)
PLATELET # BLD AUTO: 178 X10(3)/MCL (ref 130–400)
PMV BLD AUTO: 9.9 FL (ref 7.4–10.4)
POCT GLUCOSE: 98 MG/DL (ref 70–110)
POTASSIUM SERPL-SCNC: 3.3 MMOL/L (ref 3.5–5.1)
PROT SERPL-MCNC: 6.1 GM/DL (ref 5.8–7.6)
RBC # BLD AUTO: 3.52 X10(6)/MCL (ref 4.7–6.1)
SODIUM SERPL-SCNC: 141 MMOL/L (ref 136–145)
VANCOMYCIN SERPL-MCNC: 15.8 UG/ML (ref 15–20)
VANCOMYCIN TROUGH SERPL-MCNC: 23.9 UG/ML (ref 15–20)
WBC # SPEC AUTO: 5.33 X10(3)/MCL (ref 4.5–11.5)

## 2024-01-21 PROCEDURE — 25000003 PHARM REV CODE 250: Performed by: INTERNAL MEDICINE

## 2024-01-21 PROCEDURE — 63600175 PHARM REV CODE 636 W HCPCS: Performed by: INTERNAL MEDICINE

## 2024-01-21 PROCEDURE — 80202 ASSAY OF VANCOMYCIN: CPT | Performed by: INTERNAL MEDICINE

## 2024-01-21 PROCEDURE — 94003 VENT MGMT INPAT SUBQ DAY: CPT

## 2024-01-21 PROCEDURE — 84100 ASSAY OF PHOSPHORUS: CPT | Performed by: INTERNAL MEDICINE

## 2024-01-21 PROCEDURE — 94761 N-INVAS EAR/PLS OXIMETRY MLT: CPT

## 2024-01-21 PROCEDURE — 99900035 HC TECH TIME PER 15 MIN (STAT)

## 2024-01-21 PROCEDURE — 85025 COMPLETE CBC W/AUTO DIFF WBC: CPT | Performed by: INTERNAL MEDICINE

## 2024-01-21 PROCEDURE — 83735 ASSAY OF MAGNESIUM: CPT | Performed by: INTERNAL MEDICINE

## 2024-01-21 PROCEDURE — 20000000 HC ICU ROOM

## 2024-01-21 PROCEDURE — 27100171 HC OXYGEN HIGH FLOW UP TO 24 HOURS

## 2024-01-21 PROCEDURE — C9113 INJ PANTOPRAZOLE SODIUM, VIA: HCPCS | Performed by: INTERNAL MEDICINE

## 2024-01-21 PROCEDURE — 25000003 PHARM REV CODE 250

## 2024-01-21 PROCEDURE — 99900026 HC AIRWAY MAINTENANCE (STAT)

## 2024-01-21 PROCEDURE — 80053 COMPREHEN METABOLIC PANEL: CPT | Performed by: INTERNAL MEDICINE

## 2024-01-21 RX ORDER — POTASSIUM CHLORIDE 14.9 MG/ML
20 INJECTION INTRAVENOUS
Status: DISPENSED | OUTPATIENT
Start: 2024-01-21 | End: 2024-01-21

## 2024-01-21 RX ADMIN — POTASSIUM CHLORIDE 20 MEQ: 14.9 INJECTION, SOLUTION INTRAVENOUS at 02:01

## 2024-01-21 RX ADMIN — PANTOPRAZOLE SODIUM 40 MG: 40 INJECTION, POWDER, FOR SOLUTION INTRAVENOUS at 08:01

## 2024-01-21 RX ADMIN — POTASSIUM CHLORIDE 20 MEQ: 14.9 INJECTION, SOLUTION INTRAVENOUS at 04:01

## 2024-01-21 RX ADMIN — DEXMEDETOMIDINE HYDROCHLORIDE 0.3 MCG/KG/HR: 400 INJECTION INTRAVENOUS at 10:01

## 2024-01-21 RX ADMIN — ATORVASTATIN CALCIUM 10 MG: 10 TABLET, FILM COATED ORAL at 08:01

## 2024-01-21 RX ADMIN — DEXMEDETOMIDINE HYDROCHLORIDE 0.4 MCG/KG/HR: 400 INJECTION INTRAVENOUS at 02:01

## 2024-01-21 RX ADMIN — LEVOFLOXACIN 750 MG: 750 INJECTION, SOLUTION INTRAVENOUS at 05:01

## 2024-01-21 RX ADMIN — CARVEDILOL 3.12 MG: 3.12 TABLET, FILM COATED ORAL at 08:01

## 2024-01-21 RX ADMIN — RIVAROXABAN 20 MG: 10 TABLET, FILM COATED ORAL at 05:01

## 2024-01-21 RX ADMIN — MUPIROCIN: 20 OINTMENT TOPICAL at 08:01

## 2024-01-21 RX ADMIN — LEVETIRACETAM INJECTION 1500 MG: 15 INJECTION INTRAVENOUS at 08:01

## 2024-01-21 RX ADMIN — DILTIAZEM HYDROCHLORIDE 240 MG: 60 TABLET, FILM COATED ORAL at 08:01

## 2024-01-21 RX ADMIN — DEXMEDETOMIDINE HYDROCHLORIDE 0.3 MCG/KG/HR: 400 INJECTION INTRAVENOUS at 11:01

## 2024-01-21 RX ADMIN — QUETIAPINE FUMARATE 25 MG: 25 TABLET ORAL at 08:01

## 2024-01-21 RX ADMIN — VANCOMYCIN HYDROCHLORIDE 1250 MG: 1.25 INJECTION, POWDER, LYOPHILIZED, FOR SOLUTION INTRAVENOUS at 08:01

## 2024-01-21 RX ADMIN — LOSARTAN POTASSIUM 50 MG: 50 TABLET, FILM COATED ORAL at 08:01

## 2024-01-21 NOTE — NURSING
Nurses Note -- 4 Eyes      1/21/2024   5:24 PM      Skin assessed during: Daily Assessment      [x] No Altered Skin Integrity Present    [x]Prevention Measures Documented      [] Yes- Altered Skin Integrity Present or Discovered   [] LDA Added if Not in Epic (Describe Wound)   [] New Altered Skin Integrity was Present on Admit and Documented in LDA   [] Wound Image Taken    Wound Care Consulted? No    Attending Nurse:  Jhonny Johnson RN/Staff Member: Emily SALES

## 2024-01-21 NOTE — PROGRESS NOTES
Pharmacokinetic Assessment Follow Up: IV Vancomycin    Vancomycin serum concentration assessment(s):    The trough level was drawn correctly and can be used to guide therapy at this time. The measurement is above the desired definitive target range of 15 to 20 mcg/mL.    Vancomycin Regimen Plan:    Renal function is stable, supratherapeutic level, will hold next dose and reassess level 12h from now. Was on 1500mg q12h, will likely need lower dose when appropriate to restart   Discontinue the scheduled vancomycin regimen and re-dose when the random level is less than 20 mcg/mL, next level to be drawn at 2100 on 1/21.    Scheduled Administration Times        Drug levels (last 3 results):  Recent Labs   Lab Result Units 01/19/24  0909 01/21/24  0906   Vancomycin Trough ug/ml 22.2* 23.9*       Vancomycin Administrations:  vancomycin given in the last 96 hours                     vancomycin 1.5 g in dextrose 5 % 250 mL IVPB (ready to mix) (mg) 1,500 mg New Bag 01/20/24 2214     1,500 mg New Bag  0926     1,500 mg New Bag 01/19/24 2211    vancomycin 1.75 g in 5 % dextrose 500 mL IVPB (mg) 1,750 mg New Bag 01/19/24 0958     1,750 mg New Bag 01/18/24 2240     1,750 mg New Bag  1008     1,750 mg New Bag 01/17/24 2130                    Pharmacy will continue to follow and monitor vancomycin.    Please contact pharmacy at extension [phone] for questions regarding this assessment.    Thank you for the consult,   Juan Salcido       Patient brief summary:  Delio Daniel Jr. is a 68 y.o. male initiated on antimicrobial therapy with IV Vancomycin for treatment of bacteremia    The patient's current regimen is discontinued for now due to consecutive supratherapeutic levels. Reassessing level 12h from now and will restart regimen at lower dose when appropriate     Drug Allergies:   Review of patient's allergies indicates:  No Known Allergies    Actual Body Weight:  Wt Readings from Last 1 Encounters:   01/20/24 117.9 kg (260 lb)        Renal Function:   Estimated Creatinine Clearance: 118.3 mL/min (based on SCr of 0.78 mg/dL).,     Dialysis Method (if applicable):  N/A    CBC (last 72 hours):  Recent Labs   Lab Result Units 01/19/24 0217 01/20/24 0052 01/21/24  0106   WBC x10(3)/mcL 5.31 6.36 5.33   Hgb g/dL 9.6* 10.0* 9.9*   Hct % 31.0* 31.9* 32.4*   Platelet x10(3)/mcL 171 187 178   Mono % % 10.5 8.6 11.1   Eos % % 8.3 7.4 3.8   Basophil % % 0.8 0.5 0.6       Metabolic Panel (last 72 hours):  Recent Labs   Lab Result Units 01/19/24 0217 01/20/24 0052 01/21/24  0106   Sodium Level mmol/L 142 142 141   Potassium Level mmol/L 3.2* 3.2* 3.3*   Chloride mmol/L 103 103 105   Carbon Dioxide mmol/L 29 30 29   Glucose Level mg/dL 132* 135* 119*   Blood Urea Nitrogen mg/dL 9.3 9.7 8.0*   Creatinine mg/dL 0.79 0.80 0.78   Albumin Level g/dL 2.3* 2.4* 2.3*   Bilirubin Total mg/dL 0.5 0.5 0.6   Alkaline Phosphatase unit/L 51 54 54   Aspartate Aminotransferase unit/L 24 22 23   Alanine Aminotransferase unit/L 24 23 24   Magnesium Level mg/dL 2.00 1.90 2.00   Phosphorus Level mg/dL 3.1 2.9 3.4       Microbiologic Results:  Microbiology Results (last 7 days)       Procedure Component Value Units Date/Time    Blood culture #1 **CANNOT BE ORDERED STAT** [[id number]]  (Normal) Collected: 01/17/24 1256    Order Status: Completed Specimen: Blood Updated: 01/20/24 1400     CULTURE, BLOOD ([facility]) No Growth At 72 Hours    Blood culture #2 **CANNOT BE ORDERED STAT** [[id number]]  (Normal) Collected: 01/17/24 1256    Order Status: Completed Specimen: Blood Updated: 01/20/24 1400     CULTURE, BLOOD ([facility]) No Growth At 72 Hours

## 2024-01-21 NOTE — PROGRESS NOTES
[facility] - Emergency Dept  Pulmonary Critical Care Note    Patient Name: Delio Daniel Jr.  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  Patient was recently transferred soon 01/16/2024 but presents to the emergency department this afternoon with complaints of seizure-like activity and coffee-ground emesis.  Patient is unable to provide any history himself therefore HPI primarily obtained from chart review.  Patient was seen by Dr. Dewitt this morning at the [facility] and per his note this morning patient did have seizure-like activity that was abated with Keppra.  It is also mentioned that patient did have hematemesis this morning but this can not be quantified but per Dr. Dominguez note patient was having projectile vomiting however this was un quantified.  Since this event, the patient's tube feedings have been placed on hold.  Furthermore patient did also apparently have some tachycardia post seizure and some hypertension of which was corrected was some IV hydralazine.  On evaluation patient in the emergency department his pulse is 101, blood pressure is 152/95, he remains on the ventilator on SIMV 10/460/5/30%.  He is also on infusions of diltiazem, Protonix, and Keppra.    Hospital Course/Significant events:      24  Hour Interval History:  No acute events overnight.  Vital signs stable.  Afebrile with T-max 37.4° C. I/O over past 24 hours 1199/1950 ml. Remains on ventilator 460/10/5+/30%.  Remains sedated with Precedex at 0.2 mcg/kg/hour.  Blood cultures still without growth at 72 hours.  Remains on vanc and Levaquin for previous infections.  Tolerating reintroduction of Xarelto    Past Medical History:   Diagnosis Date    Arthritis     Atrial fibrillation     BPH (benign prostatic hyperplasia)     Cardiac arrest     Coronary artery disease     Cyst, kidney, acquired     Diverticulosis     Hyperlipidemia     Hypertension     MI (myocardial infarction)     Obesity     Steatosis of liver     Stroke        Past Surgical History:   Procedure Laterality Date    A-V CARDIAC PACEMAKER INSERTION Right     CARDIAC CATHETERIZATION      COLONOSCOPY W/ BIOPSIES      CRANIECTOMY Right 12/20/2023    Procedure: CRANIECTOMY;  Surgeon: rAtem Can MD;  Location: [facility] OR;  Service: Neurosurgery;  Laterality: Right;    ESOPHAGOGASTRODUODENOSCOPY W/ PEG N/A 1/2/2024    Procedure: PEG;  Surgeon: Tani Day MD;  Location: [facility] ENDOSCOPY;  Service: Gastroenterology;  Laterality: N/A;    excision of colon      TRACHEOSTOMY N/A 12/29/2023    Procedure: CREATION, TRACHEOSTOMY;  Surgeon: Patricia Winslow MD;  Location: [facility] OR;  Service: ENT;  Laterality: N/A;  REQ 1130 //  NEEDS 2 SCRUBS       Social History     Socioeconomic History    Marital status:     Number of children: 9   Occupational History    Occupation: retired   Tobacco Use    Smoking status: Never    Smokeless tobacco: Never   Substance and Sexual Activity    Alcohol use: Not Currently    Drug use: Not Currently    Sexual activity: Not Currently     Partners: Female     Social Determinants of Health     Financial Resource Strain: Low Risk  (10/19/2022)    Overall Financial Resource Strain (CARDIA)     Difficulty of Paying Living Expenses: Not hard at all   Food  Insecurity: No Food Insecurity (10/19/2022)    Hunger Vital Sign     Worried About Running Out of Food in the Last Year: Never true     Ran Out of Food in the Last Year: Never true   Transportation Needs: No Transportation Needs (10/19/2022)    PRAPARE - Transportation     Lack of Transportation (Medical): No     Lack of Transportation (Non-Medical): No   Physical Activity: Sufficiently Active (10/19/2022)    Exercise Vital Sign     Days of Exercise per Week: 6 days     Minutes of Exercise per Session: 60 min   Stress: No Stress Concern Present (10/19/2022)    [place] [employer] - Occupational Stress Questionnaire     Feeling of Stress : Not at all   Social Connections: Unknown (10/19/2022)    Social Connection and Isolation Panel [NHANES]     Frequency of Communication with Friends and Family: More than three times a week     Frequency of Social Gatherings with Friends and Family: More than three times a week     Attends Anabaptist Services: More than 4 times per year     Active Member of Clubs or Organizations: No     Attends Club or Organization Meetings: Never   Housing Stability: Low Risk  (10/19/2022)    Housing Stability Vital Sign     Unable to Pay for Housing in the Last Year: No     Number of Places Lived in the Last Year: 1     Unstable Housing in the Last Year: No           Current Outpatient Medications   Medication Instructions    aspirin 81 mg, Per G Tube, Daily    atorvastatin (LIPITOR) 10 mg, Per G Tube, Daily    carvediloL (COREG) 3.125 mg, Per G Tube, 2 times daily    diltiaZEM (CARDIZEM) 240 mg, Per G Tube, Daily    guaiFENesin 100 mg/5 ml (ROBITUSSIN) 400 mg, Per G Tube, Every 8 hours    losartan (COZAAR) 50 mg, Per G Tube, Daily    polyethylene glycol (GLYCOLAX) 17 g, Per G Tube, Daily PRN    QUEtiapine (SEROQUEL) 25 mg, Per G Tube, 2 times daily       Current Inpatient Medications   atorvastatin  10 mg Per G Tube Daily    carvediloL  3.125 mg Per G Tube BID    diltiaZEM   240 mg Per G Tube Daily    levETIRAcetam (Keppra) IV (PEDS and ADULTS)  1,500 mg Intravenous Q12H    levoFLOXacin  750 mg Intravenous Q24H    losartan  50 mg Per G Tube Daily    mupirocin   Nasal BID    pantoprazole  40 mg Intravenous BID    potassium chloride in water  20 mEq Intravenous Q1H    QUEtiapine  25 mg Per G Tube BID    rivaroxaban  20 mg Per G Tube Daily with dinner    vancomycin (VANCOCIN) IV (PEDS and ADULTS)  1,500 mg Intravenous Q12H       Current Intravenous Infusions   dexmedeTOMIDine (Precedex) infusion (titrating) 0.4 mcg/kg/hr (01/21/24 0248)    propofoL           Review of Systems   Unable to perform ROS: Medical condition          Objective:       Intake/Output Summary (Last 24 hours) at 1/21/2024 0318  Last data filed at 1/21/2024 0020  Gross per 24 hour   Intake 2752.86 ml   Output 3450 ml   Net -697.14 ml           Vital Signs (Most Recent):  Temp: 98.9 °F (37.2 °C) (01/21/24 0000)  Pulse: 91 (01/21/24 0300)  Resp: 19 (01/21/24 0300)  BP: 126/89 (01/21/24 0200)  SpO2: 100 % (01/21/24 0300)  Body mass index is 37.31 kg/m².  Weight: 117.9 kg (260 lb) Vital Signs (24h Range):  Temp:  [98.4 °F (36.9 °C)-99.3 °F (37.4 °C)] 98.9 °F (37.2 °C)  Pulse:  [] 91  Resp:  [11-23] 19  SpO2:  [96 %-100 %] 100 %  BP: (108-150)/() 126/89     Physical Exam  Vitals and nursing note reviewed.   Constitutional:       General: He is not in acute distress.     Appearance: He is not toxic-appearing.      Comments: Intubated per trach   HENT:      Head: Normocephalic and atraumatic.   Cardiovascular:      Rate and Rhythm: Normal rate. Rhythm irregular.      Pulses: Normal pulses.      Heart sounds: No murmur heard.     No gallop.   Pulmonary:      Effort: No respiratory distress.      Breath sounds: No stridor. No wheezing, rhonchi or rales.   Abdominal:      General: Abdomen is flat. There is no distension.      Palpations: Abdomen is soft.   Genitourinary:     Comments: There is a Fernandez and rectal tube  present  Musculoskeletal:         General: No swelling or deformity.      Left lower leg: No edema.   Skin:     General: Skin is warm.      Coloration: Skin is not jaundiced.      Findings: No bruising.   Neurological:      Comments:     Patient is essentially unresponsive/noninteractive  Spontaneously opens eyes  Does not track           Lines/Drains/Airways       Peripherally Inserted Central Catheter Line  Duration             PICC Triple Lumen 01/15/24 1424 right basilic 5 days              Drain  Duration                  Gastrostomy/Enterostomy 01/02/24 1230 LUQ 18 days         Fecal Incontinence  01/07/24 0357 13 days         Urethral Catheter 01/14/24 0521 Silicone 6 days              Airway  Duration             Adult Surgical Airway 12/29/23 1229 22 days                    Significant Labs:    Lab Results   Component Value Date    WBC 5.33 01/21/2024    HGB 9.9 (L) 01/21/2024    HCT 32.4 (L) 01/21/2024    MCV 92.0 01/21/2024     01/21/2024           BMP  Lab Results   Component Value Date     01/21/2024    K 3.3 (L) 01/21/2024    CHLORIDE 105 01/21/2024    CO2 29 01/21/2024    BUN 8.0 (L) 01/21/2024    CREATININE 0.78 01/21/2024    CALCIUM 8.3 (L) 01/21/2024    AGAP 7.0 01/04/2024    EGFRNONAA 61 04/23/2022         ABG  Recent Labs   Lab 01/18/24  0605   PH 7.530*   PO2 130.0*   PCO2 41.0   HCO3 34.3*   POCBASEDEF 10.60*         Mechanical Ventilation Support:  Vent Mode: SIMV (01/21/24 0258)  Set Rate: 10 BPM (01/21/24 0258)  Vt Set: 460 mL (01/21/24 0258)  Pressure Support: 15 cmH20 (01/21/24 0258)  PEEP/CPAP: 5 cmH20 (01/21/24 0258)  Oxygen Concentration (%): 30 (01/21/24 0258)  Peak Airway Pressure: 24 cmH20 (01/21/24 0258)  Total Ve: 9.2 L/m (01/21/24 0258)  F/VT Ratio<105 (RSBI): (!) 33.42 (01/21/24 0258)      Significant Imaging:  I have reviewed the pertinent imaging within the past 24 hours.        Assessment/Plan:     Assessment  Seizure-like activity - resolved  Hematemesis  - resolved  AFib RVR - resolved  CVA s/p right ICA and MCA M3 branch thrombectomy with hemorrhagic conversion s/p craniectomy  Right frontotemporal parietal DCH 12/23/2023  Hypoxic respiratory failure requiring intubation s/p trach  Superficial thrombosis in left cephalic vein seen on DVT ultrasound 12/26/2023  Enterobacter UTI on culture 01/14/2024  Pansensitive Raoultella Ornithinolytica on respiratory culture 01/14/2024  Staph epidermidis bacteremia    Plan  1. Patient remains on mechanical ventilation as he has not able to be weaned.  Daily T-piece trial  2. Continue Xarelto  5. Continue diltiazem 240 mg, Coreg 3.125 mg b.i.d., and losartan 50 mg  5. Continue IV vancomycin stop date 1/27/24  6. Stop Levaquin today  7. Likely discharge back to LTAC soon    DVT Prophylaxis:  Xarelto  GI Prophylaxis:Protonix     32 minutes of critical care was time spent personally by me on the following activities: development of treatment plan with patient or surrogate and bedside caregivers, discussions with consultants, evaluation of patient's response to treatment, examination of patient, ordering and performing treatments and interventions, ordering and review of laboratory studies, ordering and review of radiographic studies, pulse oximetry, re-evaluation of patient's condition.  This critical care time did not overlap with that of any other provider or involve time for any procedures.     Magdi Rivera DO  Pulmonary Critical Care Medicine  DOS: 01/21/2024

## 2024-01-22 LAB
ALBUMIN SERPL-MCNC: 2.5 G/DL (ref 3.4–4.8)
ALBUMIN/GLOB SERPL: 0.7 RATIO (ref 1.1–2)
ALP SERPL-CCNC: 61 UNIT/L (ref 40–150)
ALT SERPL-CCNC: 23 UNIT/L (ref 0–55)
AST SERPL-CCNC: 21 UNIT/L (ref 5–34)
BACTERIA BLD CULT: NORMAL
BACTERIA BLD CULT: NORMAL
BASOPHILS # BLD AUTO: 0.04 X10(3)/MCL
BASOPHILS NFR BLD AUTO: 0.6 %
BILIRUB SERPL-MCNC: 0.6 MG/DL
BUN SERPL-MCNC: 11.8 MG/DL (ref 8.4–25.7)
CALCIUM SERPL-MCNC: 8.1 MG/DL (ref 8.8–10)
CHLORIDE SERPL-SCNC: 104 MMOL/L (ref 98–107)
CO2 SERPL-SCNC: 28 MMOL/L (ref 23–31)
CREAT SERPL-MCNC: 0.77 MG/DL (ref 0.73–1.18)
EOSINOPHIL # BLD AUTO: 0.26 X10(3)/MCL (ref 0–0.9)
EOSINOPHIL NFR BLD AUTO: 4 %
ERYTHROCYTE [DISTWIDTH] IN BLOOD BY AUTOMATED COUNT: 15.2 % (ref 11.5–17)
GFR SERPLBLD CREATININE-BSD FMLA CKD-EPI: >60 MLS/MIN/1.73/M2
GLOBULIN SER-MCNC: 3.4 GM/DL (ref 2.4–3.5)
GLUCOSE SERPL-MCNC: 135 MG/DL (ref 82–115)
HCT VFR BLD AUTO: 34.1 % (ref 42–52)
HGB BLD-MCNC: 10.6 G/DL (ref 14–18)
IMM GRANULOCYTES # BLD AUTO: 0.07 X10(3)/MCL (ref 0–0.04)
IMM GRANULOCYTES NFR BLD AUTO: 1.1 %
LYMPHOCYTES # BLD AUTO: 1.5 X10(3)/MCL (ref 0.6–4.6)
LYMPHOCYTES NFR BLD AUTO: 23.2 %
MAGNESIUM SERPL-MCNC: 1.9 MG/DL (ref 1.6–2.6)
MCH RBC QN AUTO: 28 PG (ref 27–31)
MCHC RBC AUTO-ENTMCNC: 31.1 G/DL (ref 33–36)
MCV RBC AUTO: 90.2 FL (ref 80–94)
MONOCYTES # BLD AUTO: 0.57 X10(3)/MCL (ref 0.1–1.3)
MONOCYTES NFR BLD AUTO: 8.8 %
NEUTROPHILS # BLD AUTO: 4.02 X10(3)/MCL (ref 2.1–9.2)
NEUTROPHILS NFR BLD AUTO: 62.3 %
NRBC BLD AUTO-RTO: 0 %
PHOSPHATE SERPL-MCNC: 3.1 MG/DL (ref 2.3–4.7)
PLATELET # BLD AUTO: 189 X10(3)/MCL (ref 130–400)
PMV BLD AUTO: 10.3 FL (ref 7.4–10.4)
POTASSIUM SERPL-SCNC: 3.5 MMOL/L (ref 3.5–5.1)
PROT SERPL-MCNC: 5.9 GM/DL (ref 5.8–7.6)
RBC # BLD AUTO: 3.78 X10(6)/MCL (ref 4.7–6.1)
SODIUM SERPL-SCNC: 140 MMOL/L (ref 136–145)
WBC # SPEC AUTO: 6.46 X10(3)/MCL (ref 4.5–11.5)

## 2024-01-22 PROCEDURE — 20000000 HC ICU ROOM

## 2024-01-22 PROCEDURE — 27100171 HC OXYGEN HIGH FLOW UP TO 24 HOURS

## 2024-01-22 PROCEDURE — 63600175 PHARM REV CODE 636 W HCPCS: Performed by: INTERNAL MEDICINE

## 2024-01-22 PROCEDURE — 25000003 PHARM REV CODE 250: Performed by: INTERNAL MEDICINE

## 2024-01-22 PROCEDURE — 84100 ASSAY OF PHOSPHORUS: CPT | Performed by: INTERNAL MEDICINE

## 2024-01-22 PROCEDURE — 94761 N-INVAS EAR/PLS OXIMETRY MLT: CPT

## 2024-01-22 PROCEDURE — 85025 COMPLETE CBC W/AUTO DIFF WBC: CPT | Performed by: INTERNAL MEDICINE

## 2024-01-22 PROCEDURE — C9113 INJ PANTOPRAZOLE SODIUM, VIA: HCPCS | Performed by: INTERNAL MEDICINE

## 2024-01-22 PROCEDURE — 99900031 HC PATIENT EDUCATION (STAT)

## 2024-01-22 PROCEDURE — 25000003 PHARM REV CODE 250

## 2024-01-22 PROCEDURE — 99900035 HC TECH TIME PER 15 MIN (STAT)

## 2024-01-22 PROCEDURE — 94003 VENT MGMT INPAT SUBQ DAY: CPT

## 2024-01-22 PROCEDURE — 80053 COMPREHEN METABOLIC PANEL: CPT | Performed by: INTERNAL MEDICINE

## 2024-01-22 PROCEDURE — 99900026 HC AIRWAY MAINTENANCE (STAT)

## 2024-01-22 PROCEDURE — 83735 ASSAY OF MAGNESIUM: CPT | Performed by: INTERNAL MEDICINE

## 2024-01-22 RX ADMIN — DEXMEDETOMIDINE HYDROCHLORIDE 0.2 MCG/KG/HR: 400 INJECTION INTRAVENOUS at 10:01

## 2024-01-22 RX ADMIN — VANCOMYCIN HYDROCHLORIDE 1250 MG: 1.25 INJECTION, POWDER, LYOPHILIZED, FOR SOLUTION INTRAVENOUS at 09:01

## 2024-01-22 RX ADMIN — DILTIAZEM HYDROCHLORIDE 240 MG: 60 TABLET, FILM COATED ORAL at 09:01

## 2024-01-22 RX ADMIN — CARVEDILOL 3.12 MG: 3.12 TABLET, FILM COATED ORAL at 09:01

## 2024-01-22 RX ADMIN — QUETIAPINE FUMARATE 25 MG: 25 TABLET ORAL at 09:01

## 2024-01-22 RX ADMIN — LOSARTAN POTASSIUM 50 MG: 50 TABLET, FILM COATED ORAL at 09:01

## 2024-01-22 RX ADMIN — QUETIAPINE FUMARATE 25 MG: 25 TABLET ORAL at 08:01

## 2024-01-22 RX ADMIN — CARVEDILOL 3.12 MG: 3.12 TABLET, FILM COATED ORAL at 08:01

## 2024-01-22 RX ADMIN — VANCOMYCIN HYDROCHLORIDE 1250 MG: 1.25 INJECTION, POWDER, LYOPHILIZED, FOR SOLUTION INTRAVENOUS at 10:01

## 2024-01-22 RX ADMIN — RIVAROXABAN 20 MG: 10 TABLET, FILM COATED ORAL at 05:01

## 2024-01-22 RX ADMIN — LEVETIRACETAM INJECTION 1500 MG: 15 INJECTION INTRAVENOUS at 09:01

## 2024-01-22 RX ADMIN — MUPIROCIN: 20 OINTMENT TOPICAL at 09:01

## 2024-01-22 RX ADMIN — PANTOPRAZOLE SODIUM 40 MG: 40 INJECTION, POWDER, FOR SOLUTION INTRAVENOUS at 09:01

## 2024-01-22 RX ADMIN — PANTOPRAZOLE SODIUM 40 MG: 40 INJECTION, POWDER, FOR SOLUTION INTRAVENOUS at 08:01

## 2024-01-22 RX ADMIN — ATORVASTATIN CALCIUM 10 MG: 10 TABLET, FILM COATED ORAL at 09:01

## 2024-01-22 RX ADMIN — LEVETIRACETAM INJECTION 1500 MG: 15 INJECTION INTRAVENOUS at 08:01

## 2024-01-22 NOTE — PROGRESS NOTES
[facility] - Emergency Dept  Pulmonary Critical Care Note    Patient Name: Delio Daniel Jr.  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  Patient was recently transferred soon 01/16/2024 but presents to the emergency department this afternoon with complaints of seizure-like activity and coffee-ground emesis.  Patient is unable to provide any history himself therefore HPI primarily obtained from chart review.  Patient was seen by Dr. Dewitt this morning at the [facility] and per his note this morning patient did have seizure-like activity that was abated with Keppra.  It is also mentioned that patient did have hematemesis this morning but this can not be quantified but per Dr. Dominguez note patient was having projectile vomiting however this was un quantified.  Since this event, the patient's tube feedings have been placed on hold.  Furthermore patient did also apparently have some tachycardia post seizure and some hypertension of which was corrected was some IV hydralazine.  On evaluation patient in the emergency department his pulse is 101, blood pressure is 152/95, he remains on the ventilator on SIMV 10/460/5/30%.  He is also on infusions of diltiazem, Protonix, and Keppra.    Hospital Course/Significant events:      24  Hour Interval History:  No acute events overnight.  Stable. Waiting for LTAC. H/H stable    Past Medical History:   Diagnosis Date    Arthritis     Atrial fibrillation     BPH (benign prostatic hyperplasia)     Cardiac arrest     Coronary artery disease     Cyst, kidney, acquired     Diverticulosis     Hyperlipidemia     Hypertension     MI (myocardial infarction)     Obesity     Steatosis of liver     Stroke        Past Surgical History:   Procedure Laterality Date    A-V CARDIAC PACEMAKER INSERTION Right     CARDIAC CATHETERIZATION      COLONOSCOPY W/ BIOPSIES      CRANIECTOMY Right 12/20/2023    Procedure: CRANIECTOMY;  Surgeon: Artem Can MD;  Location: [facility] OR;  Service: Neurosurgery;  Laterality: Right;    ESOPHAGOGASTRODUODENOSCOPY W/ PEG N/A 1/2/2024    Procedure: PEG;  Surgeon: Tani Day MD;  Location: [facility] ENDOSCOPY;  Service: Gastroenterology;  Laterality: N/A;    excision of colon      TRACHEOSTOMY N/A 12/29/2023    Procedure: CREATION, TRACHEOSTOMY;  Surgeon: Patricia Winslow MD;  Location: [facility] OR;  Service: ENT;  Laterality: N/A;  REQ 1130 //  NEEDS 2 SCRUBS       Social History     Socioeconomic History    Marital status:     Number of children: 9   Occupational History    Occupation: retired   Tobacco Use    Smoking status: Never    Smokeless tobacco: Never   Substance and Sexual Activity    Alcohol use: Not Currently    Drug use: Not Currently    Sexual activity: Not Currently     Partners: Female     Social Determinants of Health     Financial Resource Strain: Low Risk  (10/19/2022)    Overall Financial Resource Strain (CARDIA)     Difficulty of Paying Living Expenses: Not hard at all   Food Insecurity: No Food Insecurity (10/19/2022)    Hunger Vital Sign     Worried About Running Out of Food in the Last Year: Never true     Ran Out of Food in the Last Year: Never true   Transportation Needs: No Transportation Needs (10/19/2022)    PRAPARE - Transportation     Lack of  Transportation (Medical): No     Lack of Transportation (Non-Medical): No   Physical Activity: Sufficiently Active (10/19/2022)    Exercise Vital Sign     Days of Exercise per Week: 6 days     Minutes of Exercise per Session: 60 min   Stress: No Stress Concern Present (10/19/2022)    [place] of Occupational Health - Occupational Stress Questionnaire     Feeling of Stress : Not at all   Social Connections: Unknown (10/19/2022)    Social Connection and Isolation Panel [NHANES]     Frequency of Communication with Friends and Family: More than three times a week     Frequency of Social Gatherings with Friends and Family: More than three times a week     Attends Mandaeism Services: More than 4 times per year     Active Member of Clubs or Organizations: No     Attends Club or Organization Meetings: Never   Housing Stability: Low Risk  (10/19/2022)    Housing Stability Vital Sign     Unable to Pay for Housing in the Last Year: No     Number of Places Lived in the Last Year: 1     Unstable Housing in the Last Year: No           Current Outpatient Medications   Medication Instructions    aspirin 81 mg, Per G Tube, Daily    atorvastatin (LIPITOR) 10 mg, Per G Tube, Daily    carvediloL (COREG) 3.125 mg, Per G Tube, 2 times daily    diltiaZEM (CARDIZEM) 240 mg, Per G Tube, Daily    guaiFENesin 100 mg/5 ml (ROBITUSSIN) 400 mg, Per G Tube, Every 8 hours    losartan (COZAAR) 50 mg, Per G Tube, Daily    polyethylene glycol (GLYCOLAX) 17 g, Per G Tube, Daily PRN    QUEtiapine (SEROQUEL) 25 mg, Per G Tube, 2 times daily       Current Inpatient Medications   atorvastatin  10 mg Per G Tube Daily    carvediloL  3.125 mg Per G Tube BID    diltiaZEM  240 mg Per G Tube Daily    levETIRAcetam (Keppra) IV (PEDS and ADULTS)  1,500 mg Intravenous Q12H    losartan  50 mg Per G Tube Daily    mupirocin   Nasal BID    pantoprazole  40 mg Intravenous BID    QUEtiapine  25 mg Per G Tube BID    rivaroxaban  20 mg Per G Tube Daily with  dinner    vancomycin (VANCOCIN) IV (PEDS and ADULTS)  1,250 mg Intravenous Q12H       Current Intravenous Infusions   dexmedeTOMIDine (Precedex) infusion (titrating) 0.3 mcg/kg/hr (01/21/24 2350)    propofoL           Review of Systems   Unable to perform ROS: Medical condition          Objective:       Intake/Output Summary (Last 24 hours) at 1/22/2024 0757  Last data filed at 1/21/2024 2350  Gross per 24 hour   Intake 2031.9 ml   Output 2100 ml   Net -68.1 ml           Vital Signs (Most Recent):  Temp: 98.3 °F (36.8 °C) (01/22/24 0400)  Pulse: 82 (01/22/24 0400)  Resp: 18 (01/22/24 0400)  BP: (!) 132/93 (01/22/24 0300)  SpO2: 100 % (01/22/24 0400)  Body mass index is 37.31 kg/m².  Weight: 117.9 kg (260 lb) Vital Signs (24h Range):  Temp:  [98.3 °F (36.8 °C)-98.9 °F (37.2 °C)] 98.3 °F (36.8 °C)  Pulse:  [60-94] 82  Resp:  [11-20] 18  SpO2:  [97 %-100 %] 100 %  BP: (114-148)/(79-99) 132/93     Physical Exam  Vitals and nursing note reviewed.   Constitutional:       General: He is not in acute distress.     Appearance: He is not toxic-appearing.      Comments: Intubated per trach   HENT:      Head: Normocephalic and atraumatic.   Cardiovascular:      Rate and Rhythm: Normal rate. Rhythm irregular.      Pulses: Normal pulses.      Heart sounds: No murmur heard.     No gallop.   Pulmonary:      Effort: No respiratory distress.      Breath sounds: No stridor. No wheezing, rhonchi or rales.   Abdominal:      General: Abdomen is flat. There is no distension.      Palpations: Abdomen is soft.   Genitourinary:     Comments: There is a Fernandez and rectal tube present  Musculoskeletal:         General: No swelling or deformity.      Left lower leg: No edema.   Skin:     General: Skin is warm.      Coloration: Skin is not jaundiced.      Findings: No bruising.   Neurological:      Comments:     Patient is essentially unresponsive/noninteractive  Spontaneously opens eyes  Does not track           Lines/Drains/Airways        Peripherally Inserted Central Catheter Line  Duration             PICC Triple Lumen 01/15/24 1424 right basilic 6 days              Drain  Duration                  Gastrostomy/Enterostomy 01/02/24 1230 LUQ 19 days         Fecal Incontinence  01/07/24 0357 15 days         Urethral Catheter 01/14/24 0521 Silicone 8 days              Airway  Duration             Adult Surgical Airway 12/29/23 1229 23 days                    Significant Labs:    Lab Results   Component Value Date    WBC 6.46 01/22/2024    HGB 10.6 (L) 01/22/2024    HCT 34.1 (L) 01/22/2024    MCV 90.2 01/22/2024     01/22/2024           BMP  Lab Results   Component Value Date     01/22/2024    K 3.5 01/22/2024    CHLORIDE 104 01/22/2024    CO2 28 01/22/2024    BUN 11.8 01/22/2024    CREATININE 0.77 01/22/2024    CALCIUM 8.1 (L) 01/22/2024    AGAP 7.0 01/04/2024    EGFRNONAA 61 04/23/2022         ABG  Recent Labs   Lab 01/18/24  0605   PH 7.530*   PO2 130.0*   PCO2 41.0   HCO3 34.3*   POCBASEDEF 10.60*         Mechanical Ventilation Support:  Vent Mode: SIMV (01/22/24 0322)  Set Rate: 10 BPM (01/22/24 0322)  Vt Set: 460 mL (01/22/24 0322)  Pressure Support: 15 cmH20 (01/22/24 0322)  PEEP/CPAP: 5 cmH20 (01/22/24 0322)  Oxygen Concentration (%): 30 (01/22/24 0322)  Peak Airway Pressure: 29 cmH20 (01/22/24 0322)  Plateau Pressure: 777 cmH20 (01/21/24 1600)  Total Ve: 11 L/m (01/22/24 0322)  F/VT Ratio<105 (RSBI): (!) 34.31 (01/22/24 0322)      Significant Imaging:  I have reviewed the pertinent imaging within the past 24 hours.        Assessment/Plan:     Assessment  Seizure-like activity - resolved  Hematemesis - resolved  AFib RVR - resolved  CVA s/p right ICA and MCA M3 branch thrombectomy with hemorrhagic conversion s/p craniectomy  Right frontotemporal parietal DCH 12/23/2023  Hypoxic respiratory failure requiring intubation s/p trach  Superficial thrombosis in left cephalic vein seen on DVT ultrasound 12/26/2023  Enterobacter UTI  on culture 01/14/2024  Pansensitive Raoultella Ornithinolytica on respiratory culture 01/14/2024  Staph epidermidis bacteremia    Plan  1. Patient remains on mechanical ventilation as he has not able to be weaned.  Daily T-piece trial  2. Continue Xarelto  3. Continue diltiazem 240 mg, Coreg 3.125 mg b.i.d., and losartan 50 mg  4. Continue IV vancomycin stop date 1/27/24  5. Likely discharge back to LTAC soon    DVT Prophylaxis:  Xarelto  GI Prophylaxis:Protonix     32 minutes of critical care was time spent personally by me on the following activities: development of treatment plan with patient or surrogate and bedside caregivers, discussions with consultants, evaluation of patient's response to treatment, examination of patient, ordering and performing treatments and interventions, ordering and review of laboratory studies, ordering and review of radiographic studies, pulse oximetry, re-evaluation of patient's condition.  This critical care time did not overlap with that of any other provider or involve time for any procedures.     Magdi Rivera DO  Pulmonary Critical Care Medicine  DOS: 01/22/2024

## 2024-01-22 NOTE — NURSING
Nurses Note -- 4 Eyes      1/22/2024   5:14 PM      Skin assessed during: Daily Assessment      [x] No Altered Skin Integrity Present    []Prevention Measures Documented      [] Yes- Altered Skin Integrity Present or Discovered   [] LDA Added if Not in Epic (Describe Wound)   [] New Altered Skin Integrity was Present on Admit and Documented in LDA   [] Wound Image Taken    Wound Care Consulted? No    Attending Nurse:  Emily Amezcua RN     Second RN/Staff Member: Arvin Harding RN

## 2024-01-22 NOTE — PROGRESS NOTES
Inpatient Nutrition Assessment    Admit Date: 1/17/2024   Total duration of encounter: 5 days   Patient Age: 68 y.o.    Nutrition Recommendation/Prescription     Tube feeding recommendation:     Impact Peptide 1.5 goal rate 50 ml/hr + 2 packets ProSource TF20 daily to provide  1660 kcal/d (100% est needs)  134 g protein/d (85% est needs)  770 ml free water/d (33% est needs)  (calculations based on estimated 20 hr/d run time)      With free water flushes of 210 q4hr, total water: 2030kcal (86% est needs)    Communication of Recommendations: reviewed with nurse    Nutrition Assessment     Malnutrition Assessment/Nutrition-Focused Physical Exam    Malnutrition Context: acute illness or injury (01/18/24 1316)  Malnutrition Level:  (does not meet criteria) (01/18/24 1316)  Energy Intake (Malnutrition):  (does not meet criteria) (01/18/24 1316)  Weight Loss (Malnutrition):  (does not meet criteria) (01/18/24 1316)  Subcutaneous Fat (Malnutrition):  (does not meet criteria) (01/18/24 1316)           Muscle Mass (Malnutrition):  (does not meet criteria) (01/18/24 1316)                          Fluid Accumulation (Malnutrition):  (does not meet criteria) (01/18/24 1316)        A minimum of two characteristics is recommended for diagnosis of either severe or non-severe malnutrition.    Chart Review    Reason Seen: continuous nutrition monitoring, malnutrition screening tool (MST), and physician consult for TF    Malnutrition Screening Tool Results   Have you recently lost weight without trying?: Unsure  Have you been eating poorly because of a decreased appetite?: No   MST Score: 2   Diagnosis:  Seizure-like activity  Hematemesis  AFib RVR    Relevant Medical History:    Arthritis      Atrial fibrillation      BPH (benign prostatic hyperplasia)      Cardiac arrest      Coronary artery disease      Cyst, kidney, acquired      Diverticulosis      Hyperlipidemia      Hypertension      MI (myocardial infarction)      Obesity       Steatosis of liver      Stroke        Scheduled Medications:  atorvastatin, 10 mg, Daily  carvediloL, 3.125 mg, BID  diltiaZEM, 240 mg, Daily  levETIRAcetam (Keppra) IV (PEDS and ADULTS), 1,500 mg, Q12H  losartan, 50 mg, Daily  pantoprazole, 40 mg, BID  QUEtiapine, 25 mg, BID  rivaroxaban, 20 mg, Daily with dinner  vancomycin (VANCOCIN) IV (PEDS and ADULTS), 1,250 mg, Q12H    Continuous Infusions:  dexmedeTOMIDine (Precedex) infusion (titrating), Last Rate: Stopped (01/22/24 1141)  propofoL    PRN Medications: hydrALAZINE, labetalol, metoprolol, polyethylene glycol, sodium chloride 0.9%, Pharmacy to dose Vancomycin consult **AND** vancomycin - pharmacy to dose    Calorie Containing IV Medications: no significant kcals from medications at this time    Recent Labs   Lab 01/17/24  0325 01/17/24  1256 01/17/24  1913 01/18/24  0016 01/19/24  0217 01/20/24  0052 01/21/24  0106 01/22/24  0224    144  --  143 142 142 141 140   K 3.2* 3.4*  --  3.2* 3.2* 3.2* 3.3* 3.5   CALCIUM 8.7* 8.0*  --  8.1* 8.0* 7.9* 8.3* 8.1*   PHOS 2.8  --   --  2.8 3.1 2.9 3.4 3.1   MG 1.90  --   --  1.90 2.00 1.90 2.00 1.90   CHLORIDE 103 104  --  105 103 103 105 104   CO2 30 30  --  31 29 30 29 28   BUN 14.5 15.1  --  12.0 9.3 9.7 8.0* 11.8   CREATININE 0.83 0.82  --  0.78 0.79 0.80 0.78 0.77   EGFRNORACEVR >60 >60  --  >60 >60 >60 >60 >60   GLUCOSE 122* 126*  --  159* 132* 135* 119* 135*   BILITOT 0.4 0.5  --  0.6 0.5 0.5 0.6 0.6   ALKPHOS 54 55  --  51 51 54 54 61   ALT 28 28  --  27 24 23 24 23   AST 28 32  --  30 24 22 23 21   ALBUMIN 2.5* 2.6*  --  2.5* 2.3* 2.4* 2.3* 2.5*   PREALB 18.4  --   --   --   --   --   --   --    WBC 10.43 10.09  --  8.21 5.31 6.36 5.33 6.46   HGB 10.6* 10.0* 9.5* 9.4* 9.6* 10.0* 9.9* 10.6*   HCT 33.3* 32.0* 30.9* 30.0* 31.0* 31.9* 32.4* 34.1*       Nutrition Orders:  Diet NPO      Appetite/Oral Intake: not applicable/not applicable  Factors Affecting Nutritional Intake: NPO  Food/Yazidi/Cultural  "Preferences: unable to obtain  Food Allergies: none reported  Last Bowel Movement: 01/21/24  Wound(s):  incision noted    Comments    1/18/24: Discussed with RN. Will provide TF recommendations for when appropriate to start TF. Can continue with previous TF recommendations (pt recently D/C'd.) Noted possible GI bleed. No kcal from meds.Noted MST, unable to verify UBW with pt. No wt loss since previous admission wts.    1/22/24: TF continues, tolerated @ goal rate per RN.     Anthropometrics    Height: 5' 10" (177.8 cm),    Last Weight: 117.9 kg (260 lb) (01/20/24 0700), Weight Method: Bed Scale  BMI (Calculated): 37.3  BMI Classification: obese grade II (BMI 35-39.9)        Ideal Body Weight (IBW), Male: 166 lb     % Ideal Body Weight, Male (lb): 156.63 %                          Usual Weight Provided By: unable to obtain usual weight    Wt Readings from Last 5 Encounters:   01/20/24 117.9 kg (260 lb)   01/16/24 119.6 kg (263 lb 10.7 oz)   12/29/23 119.9 kg (264 lb 5.3 oz)   12/11/23 112.7 kg (248 lb 7.3 oz)   12/05/23 112.3 kg (247 lb 9.6 oz)     Weight Change(s) Since Admission:   Wt Readings from Last 1 Encounters:   01/20/24 0700 117.9 kg (260 lb)   01/17/24 1219 117.9 kg (260 lb)   Admit Weight: 117.9 kg (260 lb) (01/17/24 1219), Weight Method: Bed Scale    Estimated Needs    Weight Used For Calorie Calculations: 117.9 kg (259 lb 14.8 oz)  Energy Calorie Requirements (kcal): 1300-1650kcal (11-14kcal/kg)  Energy Need Method: Kcal/kg  Weight Used For Protein Calculations: 78.2 kg (172 lb 6.4 oz) (IBW)  Protein Requirements: 157gm (2g/kg IBW)  Fluid Requirements (mL): 2358ml (20ml/kg)    Enteral Nutrition     Formula: Impact Peptide 1.5 [place]  Rate/Volume: 50ml/hr  Water Flushes: 210ml q4hr  Additives/Modulars: Prosource TF20  Route: PEG tube  Method: continuous  Total Nutrition Provided by Tube Feeding, Additives, and Flushes:  Calories Provided  1660 kcal/d, 100% needs   Protein Provided  134 g/d, 85% needs "   Fluid Provided  2030 ml/d, 86% needs   Continuous feeding calculations based on estimated 20 hr/d run time unless otherwise stated.    Parenteral Nutrition     Patient not receiving parenteral nutrition support at this time.    Evaluation of Received Nutrient Intake    Calories: meeting estimated needs  Protein: meeting estimated needs    Patient Education     Not applicable.    Nutrition Diagnosis     PES: Inadequate oral intake related to acute illness as evidenced by trach/tube feeding since admit. (active)     Nutrition Interventions     Intervention(s): collaboration with other providers    Goal: Meet greater than 80% of nutritional needs by follow-up. (goal progressing)  Goal: Tolerate enteral feeding at goal rate by follow-up. (goal progressing)    Nutrition Goals & Monitoring     Dietitian will monitor: energy intake    Nutrition Risk/Follow-Up: high (follow-up in 1-4 days)   Please consult if re-assessment needed sooner.

## 2024-01-22 NOTE — PROGRESS NOTES
Pharmacokinetic Assessment Follow Up: IV Vancomycin    Vancomycin serum concentration assessment(s):    The random level was drawn correctly and can be used to guide therapy at this time. The measurement is within the desired definitive target range of 15 to 20 mcg/mL.    Vancomycin Regimen Plan:    Change to 1250 mg q12h and check trough at 0800 on 1/23/24.    Drug levels (last 3 results):  Recent Labs   Lab Result Units 01/19/24  0909 01/21/24  0906 01/21/24  1837   Vanc Lvl Random ug/ml  --   --  15.8   Vancomycin Trough ug/ml 22.2* 23.9*  --        Pharmacy will continue to follow and monitor vancomycin.    Please contact pharmacy at extension [phone] for questions regarding this assessment.    Thank you for the consult,   Colleen Hernandez       Patient brief summary:  Delio Daniel Jr. is a 68 y.o. male initiated on antimicrobial therapy with IV Vancomycin for treatment of bacteremia      Drug Allergies:   Review of patient's allergies indicates:  No Known Allergies    Actual Body Weight:   117.9 kg    Renal Function:   Estimated Creatinine Clearance: 118.3 mL/min (based on SCr of 0.78 mg/dL).,     Dialysis Method (if applicable):  N/A    CBC (last 72 hours):  Recent Labs   Lab Result Units 01/19/24  0217 01/20/24  0052 01/21/24  0106   WBC x10(3)/mcL 5.31 6.36 5.33   Hgb g/dL 9.6* 10.0* 9.9*   Hct % 31.0* 31.9* 32.4*   Platelet x10(3)/mcL 171 187 178   Mono % % 10.5 8.6 11.1   Eos % % 8.3 7.4 3.8   Basophil % % 0.8 0.5 0.6       Metabolic Panel (last 72 hours):  Recent Labs   Lab Result Units 01/19/24  0217 01/20/24  0052 01/21/24  0106   Sodium Level mmol/L 142 142 141   Potassium Level mmol/L 3.2* 3.2* 3.3*   Chloride mmol/L 103 103 105   Carbon Dioxide mmol/L 29 30 29   Glucose Level mg/dL 132* 135* 119*   Blood Urea Nitrogen mg/dL 9.3 9.7 8.0*   Creatinine mg/dL 0.79 0.80 0.78   Albumin Level g/dL 2.3* 2.4* 2.3*   Bilirubin Total mg/dL 0.5 0.5 0.6   Alkaline Phosphatase unit/L 51 54 54   Aspartate  Aminotransferase unit/L 24 22 23   Alanine Aminotransferase unit/L 24 23 24   Magnesium Level mg/dL 2.00 1.90 2.00   Phosphorus Level mg/dL 3.1 2.9 3.4       Vancomycin Administrations:  vancomycin given in the last 96 hours                     vancomycin 1.5 g in dextrose 5 % 250 mL IVPB (ready to mix) (mg) 1,500 mg New Bag 01/20/24 2214     1,500 mg New Bag  0926     1,500 mg New Bag 01/19/24 2211    vancomycin 1.75 g in 5 % dextrose 500 mL IVPB (mg) 1,750 mg New Bag 01/19/24 0958     1,750 mg New Bag 01/18/24 2240     1,750 mg New Bag  1008     1,750 mg New Bag 01/17/24 2130                    Microbiologic Results:  Microbiology Results (last 7 days)       Procedure Component Value Units Date/Time    Blood culture #1 **CANNOT BE ORDERED STAT** [[id number]]  (Normal) Collected: 01/17/24 1256    Order Status: Completed Specimen: Blood Updated: 01/21/24 1400     CULTURE, BLOOD ([facility]) No Growth At 96 Hours    Blood culture #2 **CANNOT BE ORDERED STAT** [[id number]]  (Normal) Collected: 01/17/24 1256    Order Status: Completed Specimen: Blood Updated: 01/21/24 1400     CULTURE, BLOOD ([facility]) No Growth At 96 Hours

## 2024-01-23 LAB
ALBUMIN SERPL-MCNC: 2.5 G/DL (ref 3.4–4.8)
ALBUMIN/GLOB SERPL: 0.8 RATIO (ref 1.1–2)
ALP SERPL-CCNC: 62 UNIT/L (ref 40–150)
ALT SERPL-CCNC: 22 UNIT/L (ref 0–55)
AST SERPL-CCNC: 20 UNIT/L (ref 5–34)
BASOPHILS # BLD AUTO: 0.04 X10(3)/MCL
BASOPHILS NFR BLD AUTO: 0.6 %
BILIRUB SERPL-MCNC: 0.5 MG/DL
BUN SERPL-MCNC: 11.2 MG/DL (ref 8.4–25.7)
CALCIUM SERPL-MCNC: 8.2 MG/DL (ref 8.8–10)
CHLORIDE SERPL-SCNC: 105 MMOL/L (ref 98–107)
CO2 SERPL-SCNC: 29 MMOL/L (ref 23–31)
CREAT SERPL-MCNC: 0.8 MG/DL (ref 0.73–1.18)
EOSINOPHIL # BLD AUTO: 0.17 X10(3)/MCL (ref 0–0.9)
EOSINOPHIL NFR BLD AUTO: 2.5 %
ERYTHROCYTE [DISTWIDTH] IN BLOOD BY AUTOMATED COUNT: 15.3 % (ref 11.5–17)
GFR SERPLBLD CREATININE-BSD FMLA CKD-EPI: >60 MLS/MIN/1.73/M2
GLOBULIN SER-MCNC: 3.3 GM/DL (ref 2.4–3.5)
GLUCOSE SERPL-MCNC: 140 MG/DL (ref 82–115)
HCT VFR BLD AUTO: 31.3 % (ref 42–52)
HGB BLD-MCNC: 9.8 G/DL (ref 14–18)
IMM GRANULOCYTES # BLD AUTO: 0.05 X10(3)/MCL (ref 0–0.04)
IMM GRANULOCYTES NFR BLD AUTO: 0.7 %
LYMPHOCYTES # BLD AUTO: 1.78 X10(3)/MCL (ref 0.6–4.6)
LYMPHOCYTES NFR BLD AUTO: 25.9 %
MAGNESIUM SERPL-MCNC: 2 MG/DL (ref 1.6–2.6)
MCH RBC QN AUTO: 28.2 PG (ref 27–31)
MCHC RBC AUTO-ENTMCNC: 31.3 G/DL (ref 33–36)
MCV RBC AUTO: 89.9 FL (ref 80–94)
MONOCYTES # BLD AUTO: 0.58 X10(3)/MCL (ref 0.1–1.3)
MONOCYTES NFR BLD AUTO: 8.4 %
NEUTROPHILS # BLD AUTO: 4.25 X10(3)/MCL (ref 2.1–9.2)
NEUTROPHILS NFR BLD AUTO: 61.9 %
NRBC BLD AUTO-RTO: 0 %
PHOSPHATE SERPL-MCNC: 3.3 MG/DL (ref 2.3–4.7)
PLATELET # BLD AUTO: 210 X10(3)/MCL (ref 130–400)
PMV BLD AUTO: 10.1 FL (ref 7.4–10.4)
POTASSIUM SERPL-SCNC: 3.2 MMOL/L (ref 3.5–5.1)
PROT SERPL-MCNC: 5.8 GM/DL (ref 5.8–7.6)
RBC # BLD AUTO: 3.48 X10(6)/MCL (ref 4.7–6.1)
SODIUM SERPL-SCNC: 142 MMOL/L (ref 136–145)
VANCOMYCIN TROUGH SERPL-MCNC: 19.6 UG/ML (ref 15–20)
WBC # SPEC AUTO: 6.87 X10(3)/MCL (ref 4.5–11.5)

## 2024-01-23 PROCEDURE — 99900035 HC TECH TIME PER 15 MIN (STAT)

## 2024-01-23 PROCEDURE — 94003 VENT MGMT INPAT SUBQ DAY: CPT

## 2024-01-23 PROCEDURE — 25000003 PHARM REV CODE 250

## 2024-01-23 PROCEDURE — 80202 ASSAY OF VANCOMYCIN: CPT | Performed by: INTERNAL MEDICINE

## 2024-01-23 PROCEDURE — 25000003 PHARM REV CODE 250: Performed by: INTERNAL MEDICINE

## 2024-01-23 PROCEDURE — 87040 BLOOD CULTURE FOR BACTERIA: CPT

## 2024-01-23 PROCEDURE — 63600175 PHARM REV CODE 636 W HCPCS: Performed by: INTERNAL MEDICINE

## 2024-01-23 PROCEDURE — 85025 COMPLETE CBC W/AUTO DIFF WBC: CPT | Performed by: INTERNAL MEDICINE

## 2024-01-23 PROCEDURE — 83735 ASSAY OF MAGNESIUM: CPT | Performed by: INTERNAL MEDICINE

## 2024-01-23 PROCEDURE — 84100 ASSAY OF PHOSPHORUS: CPT | Performed by: INTERNAL MEDICINE

## 2024-01-23 PROCEDURE — 20000000 HC ICU ROOM

## 2024-01-23 PROCEDURE — 99900026 HC AIRWAY MAINTENANCE (STAT)

## 2024-01-23 PROCEDURE — 80053 COMPREHEN METABOLIC PANEL: CPT | Performed by: INTERNAL MEDICINE

## 2024-01-23 PROCEDURE — 27100171 HC OXYGEN HIGH FLOW UP TO 24 HOURS

## 2024-01-23 PROCEDURE — C9113 INJ PANTOPRAZOLE SODIUM, VIA: HCPCS | Performed by: INTERNAL MEDICINE

## 2024-01-23 PROCEDURE — 99900031 HC PATIENT EDUCATION (STAT)

## 2024-01-23 RX ORDER — POTASSIUM CHLORIDE 14.9 MG/ML
20 INJECTION INTRAVENOUS
Status: COMPLETED | OUTPATIENT
Start: 2024-01-23 | End: 2024-01-23

## 2024-01-23 RX ORDER — ACETAMINOPHEN 325 MG/1
650 TABLET ORAL EVERY 6 HOURS PRN
Status: DISCONTINUED | OUTPATIENT
Start: 2024-01-23 | End: 2024-01-24 | Stop reason: HOSPADM

## 2024-01-23 RX ADMIN — RIVAROXABAN 20 MG: 10 TABLET, FILM COATED ORAL at 04:01

## 2024-01-23 RX ADMIN — QUETIAPINE FUMARATE 25 MG: 25 TABLET ORAL at 08:01

## 2024-01-23 RX ADMIN — LOSARTAN POTASSIUM 50 MG: 50 TABLET, FILM COATED ORAL at 08:01

## 2024-01-23 RX ADMIN — ACETAMINOPHEN 650 MG: 325 TABLET, FILM COATED ORAL at 04:01

## 2024-01-23 RX ADMIN — LEVETIRACETAM INJECTION 1500 MG: 15 INJECTION INTRAVENOUS at 08:01

## 2024-01-23 RX ADMIN — VANCOMYCIN HYDROCHLORIDE 1250 MG: 1.25 INJECTION, POWDER, LYOPHILIZED, FOR SOLUTION INTRAVENOUS at 09:01

## 2024-01-23 RX ADMIN — POTASSIUM CHLORIDE 20 MEQ: 14.9 INJECTION, SOLUTION INTRAVENOUS at 02:01

## 2024-01-23 RX ADMIN — POTASSIUM CHLORIDE 20 MEQ: 14.9 INJECTION, SOLUTION INTRAVENOUS at 10:01

## 2024-01-23 RX ADMIN — DILTIAZEM HYDROCHLORIDE 240 MG: 60 TABLET, FILM COATED ORAL at 08:01

## 2024-01-23 RX ADMIN — ATORVASTATIN CALCIUM 10 MG: 10 TABLET, FILM COATED ORAL at 08:01

## 2024-01-23 RX ADMIN — POTASSIUM CHLORIDE 20 MEQ: 14.9 INJECTION, SOLUTION INTRAVENOUS at 08:01

## 2024-01-23 RX ADMIN — POTASSIUM CHLORIDE 20 MEQ: 14.9 INJECTION, SOLUTION INTRAVENOUS at 12:01

## 2024-01-23 RX ADMIN — CARVEDILOL 3.12 MG: 3.12 TABLET, FILM COATED ORAL at 08:01

## 2024-01-23 RX ADMIN — DEXMEDETOMIDINE HYDROCHLORIDE 0.3 MCG/KG/HR: 400 INJECTION INTRAVENOUS at 05:01

## 2024-01-23 RX ADMIN — PANTOPRAZOLE SODIUM 40 MG: 40 INJECTION, POWDER, FOR SOLUTION INTRAVENOUS at 08:01

## 2024-01-23 NOTE — PRE ADMISSION SCREENING
[facility]    Pre-Admission Patient Screening                    Pre-Screen type:  LTAC:  Reason for Admission:     ACUTE HYPOXIC RESPIRATORY FAILURE S/P TRACH ON VENT   CVA S/P RIGHT HEMICRANI WITH NEW ONSET OF SEIZURES     LTACH Admission Criteria:    Management of at least one of the following complex respiratory conditions:  Bronchodilators (excluding MDIs) greater than or equal to 4 times in 24 hours  Cardiac monitoring for dyspnea, electrolyte imbalances, post pacer insertion, significant arrhythmia, or syncope/pre-syncope  Mechanical ventilation/NIPPV  Active weaning process from mechanical ventilation    Must meet at least three of the following concomitant treatments/intervention daily unless notes: (excludes PO meds unless notes)  IV medication per therapeutic regimen  Bronchodilators  Cardiac Monitoring  IV fluides greater than 50 cc/hr  Laboratory assessment and medication adjustment(s)  Mechanical ventilation/NIPPV  Nebulizer treatments at least every 6 hours  Neurological assessment greater than or equal to 3 times a day  Oxygen and SaO2/ABG adjustments and greater than or equal to 28% supplemental O2  Rehab Therapy (PT/OT/ST) 1-3 hours a day greater than or equal to 5 days a week  Suctioning at least every 4 hours  Parenteral nutrition/Enteral feedings  Tracheostomy weaning      LTACH more appropriate than other levels of care (eg, skilled nursing facility, home health care), as indicated by:    Clinical management of patient deemed too frequent and needed beyond the capabilities of alternative levels of care as evidence by: Blood glucose monitoring greater than or equal to 4 times daily requiring clinical intervention, Active titration of oxygen , and Frequency of IV medications greater than or equal to 2 times daily  Frequent diagnostic services needed on an inpatient basis, including clinical assessments, laboratory, and imaging as evidence by: Frequent monitoring and clinical  assessments performed by a licensed RN to identify current and future patient needs by incorporating the recognition of normal vs abnormal body physiology, and to prompt recognition of pertinent changes to identify and prioritize appropriate interventions that can be performed within the acute inpatient setting. , Frequent monitoring and clinical assessments performed by a licensed RT to identify current and future patient needs by incorporating the recognition of normal vs abnormal body physiology of the Respiratory System, and to prompt recognition of pertinent changes to identify and prioritize appropriate interventions that can be performed within the acute inpatient setting. , and Frequent laboratory testing and/or imaging to aide in the improvement and effectiveness of patient's individualized treatment plan.   More intensive services, such as speciality nursing care, and/or onsite physician assessments needed that are not available at a lower level of care as evidence by: Daily physician intervention , Collaboration between consulting and attending providers still deemed a necessity to aide in the improvement and effectiveness of patient's individualized treatment plan, which can be provided at an [facility] level of care. , and Therapy Services to be included in patient's treatment plan in an effort to restore/improve patient's modality status to a safe level of functioning prior to acute illness.      [facility] as indicated by ALL of the following:      Hypotension Absent     Cardiovascular status stable     Stable chest findings     Renal function accepctable   Pain adequately managed    Intake acceptable       No acute significant hepatic dysfunction (eg, new encephalopathy)   No acute severe unstable neurologic abnormalities (eg, Altered mental status that is severe or persistent, or evidence of ongoing CNS embolization or ischemia, worsening hydrocephalus)   No active bleeding  or unstable disorders of hemostasis (eg, no recent need for transfusion, severe thrombocytopenia with bleeding)   No need for respiratory or other isolation, OR manageable at LTACH level of care    Long-term enteral feeding (eg, PEG) and intravenous access established, not needed, OR to be placed at LTACH level of care      Anticipated Discharge Disposition:    N/A    Facility Status: Accept     Referring Physician:  DR. SCOT LEE    Admitting Physician:  Itz Francisco MD    Primary Care Physician:  No, Primary Doctor    History         Patient Active Problem List    Diagnosis Date Noted    Seizure-like activity 01/17/2024    Coffee ground emesis 01/17/2024    Acute hypoxemic respiratory failure 01/12/2024    Stroke 12/19/2023    Benign prostatic hyperplasia 12/11/2023    Myocardial infarction 12/11/2023    Atrial fibrillation 08/26/2023    Bilateral lower extremity edema 08/16/2023    History of deep vein thrombosis (DVT) of lower extremity 08/08/2023    Current use of long term anticoagulation 08/08/2023    Hypertension 10/05/2022    Hyperlipidemia LDL goal <70 10/05/2022    Hypokalemia 10/05/2022    Coronary artery disease involving native heart without angina pectoris 10/05/2022    Second degree AV block 10/05/2022    Cardiac arrest with ventricular fibrillation 10/05/2022    Cardiac pacemaker in situ 10/05/2022    Nodule of left lung 07/20/2022    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         Previous Specialties/Consulted physicians:      Gastrology, Infectious Diseases , Neurology, and Pulmonology       Past and Current Medical History    Past Medical History:   Diagnosis Date    Arthritis     Atrial fibrillation     BPH (benign prostatic hyperplasia)     Cardiac arrest     Coronary artery disease     Cyst, kidney, acquired     Diverticulosis     Hyperlipidemia     Hypertension     MI (myocardial infarction)     Obesity     Steatosis of liver     Stroke            History of Present Illness      HPI:   This is a 68-year-old  male with a past medical history of AFib (on Xarelto), hypertension, CAD, heart failure with preserved ejection fraction (55%), PM placement in 2017 for 2nd degree AVB replaced with dcICD 5/2018 for Vfib arrest in 3/2018 d/t prolonged QT and hypokalemia; BPH, fatty liver, and 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  Patient was recently transferred soon 01/16/2024 but presents to the emergency department this afternoon with complaints of seizure-like activity and coffee-ground emesis.  Patient is unable to provide any history himself therefore HPI primarily obtained from chart review.  Patient was seen by Dr. Dewitt this morning at the LTAC and per his note this morning patient did have seizure-like activity that was abated with Keppra.  It is also mentioned that patient did have hematemesis this morning but this can not be quantified but per Dr. Dominguez note patient was having projectile vomiting however this was un quantified.  Since this event, the patient's tube feedings have been placed on hold.  Furthermore patient did also apparently have some tachycardia post seizure and some hypertension of which was corrected was some IV hydralazine.  On evaluation patient in the emergency department his pulse is 101, blood pressure is 152/95, he remains on the ventilator on SIMV 10/460/5/30%.  He is also on infusions of diltiazem, Protonix, and Keppra.     Hospital Course/Significant events:        24 Hour Interval History:  No events overnight.  Remained stable.  Waiting for LTAC    Assessment  Seizure-like activity - resolved  Hematemesis - resolved  AFib RVR - resolved  CVA s/p right ICA and MCA M3 branch thrombectomy with hemorrhagic conversion s/p craniectomy  Right frontotemporal parietal  DCH 12/23/2023  Hypoxic respiratory failure requiring intubation s/p trach  Superficial thrombosis in left cephalic vein seen on DVT ultrasound 12/26/2023  Enterobacter UTI on culture 01/14/2024  Pansensitive Raoultella Ornithinolytica on respiratory culture 01/14/2024  Staph epidermidis bacteremia     Plan  1. Patient remains on mechanical ventilation as he has not able to be weaned.  Daily T-piece trial  2. Continue Xarelto  3. Continue diltiazem 240 mg, Coreg 3.125 mg b.i.d., and losartan 50 mg  4. Continue IV vancomycin stop date 1/27/24  5. Replete potassium  6. Likely discharge back to LTAC soon      Attestation signed by Bunny Rahman MD at 1/19/2024  4:24 PM (Updated)     I have seen the patient, reviewed the Nurse Practitioner's progress note. I have personally interviewed and examined the patient at bedside and agree with the findings.   Non verbal.  No new sz on Keppra  Add Vimpat if sz recurs  Please call with any questions/detrimental changes.        Bunny Rahman MD  Neurology    HPI:   67 y/o M LTAC, Craniotomy in December, Seizure-like activity on 1/17, with coffee ground emesis, Trach/Vent present.   Pt given ativan in ED, with subsequent cessation of twitching.      CT head without on admission revealed interval worsening of cerebral edema to right cerebral hemisphere, hydrocephalus slightly worse, and significant improvement to intraparenchymal hemorrhages per radiology report.      Interval History:   Appears more awake today. No significant events or seizure activity overnight or this AM per nursing report.       Patient Traveled outside of the [place] in the last 3 months? no     Patient discharged from this LTAC to SNF within the last 45 days? no    Patient discharged from this LTAC to Rehab within the last 27 days? no    Prior residence: home    Prior Post-Acute Services: N/A     Allergies: Review of patient's allergies indicates:  No Known Allergies    Has patient received the current influenza  vaccine (Oct 1 - March 31)? Unknown     Has patient received PPD skin test prior to admit? No    Code Status: Full Code    Orientation: Unable to assess (ventilated/sedated/aphasic)    Speech: unable to assess (ventilated/sedated/aphasic)    Vital Signs:     Date     Blood Pressure     Pulse     Respiratory Rate     O2 Saturation     Temperature         Bowel/Bladder: incontinent of bladder and incontinent of bowel  Bowel/Bladder Appliance: fecal management system, Insertion Date: 1/17 and external urinary catheter    Dialysis: N/A    PICC Triple Lumen 01/15/24 1424 right basilic (Active)   $ PICC Line Charges (Upon insertion) Bedside Insertion Performed Pt > 5 Years Old (no subq port/pump or image guidance);Catheter - Triple Lumen (Supply) 01/15/24 1423   Line Necessity Review Longterm central access required;Medication caustic to vasculature 01/22/24 2000   Verification by X-ray Yes 01/21/24 0731   Site Assessment No warmth;No swelling;No redness;No drainage 01/23/24 1000   Extremity Assessment Distal to IV No warmth;No swelling;No redness;No abnormal discoloration 01/23/24 1000   Line Securement Device Secured with sutureless device 01/23/24 0800   Dressing Type CHG impregnated dressing/sponge 01/23/24 1000   Dressing Status Clean;Dry;Intact 01/23/24 1000   Dressing Intervention Integrity maintained 01/23/24 1000   Date on Dressing 01/17/24 01/23/24 0800   Dressing Due to be Changed 01/24/24 01/23/24 1000   Distal Patency/Care infusing 01/23/24 1000   Medial 1 Patency/Care infusing 01/23/24 1000   Proximal 1 Patency/Care infusing 01/23/24 1000   Current Insertion Depth (cm) 43 cm 01/15/24 1423   Current Exposed Catheter (cm) 0 cm 01/16/24 1901   Extremity Circumference (cm) 36 cm 01/15/24 1423   Number of days: 7            Gastrostomy/Enterostomy 01/02/24 1230 LUQ (Active)   Securement secured to abdomen 01/23/24 1000   Interventions Prior to Feeding patency checked 01/23/24 1000   Suction Setting/Drainage  "Method dependent drainage 01/19/24 1538   Drainage green 01/19/24 1538   Feeding Type continuous;by pump 01/23/24 1000   Clamp Status/Tolerance unclamped 01/23/24 1000   Feeding Action feeding continued 01/23/24 1000   Dressing dry and intact 01/23/24 1000   Insertion Site no swelling;no tenderness;no drainage;no warmth;no redness 01/23/24 1000   Site Care site cleansed w/ sterile normal saline 01/23/24 1000   Flush/Irrigation flushed w/;water;no resistance met 01/23/24 1000   Current Rate (mL/hr) 50 mL/hr 01/23/24 0800   Goal Rate (mL/hr) 50 mL/hr 01/23/24 0800   Intake (mL) 550 mL 01/22/24 1712   Water Bolus (mL) 420 mL 01/23/24 0500   Formula Name Impact Peptides 1.5 01/23/24 1000   Tube Feeding Intake (mL) 379 01/23/24 0500   Residual Amount (ml) 30 ml 01/17/24 0715   Number of days: 20            Urethral Catheter 01/14/24 0521 Silicone (Active)   Site Assessment Clean;Intact;Dry 01/23/24 1000   Collection Container Standard drainage bag 01/23/24 1000   Securement Method secured to top of thigh w/ adhesive device 01/23/24 1000   Catheter Care Performed yes 01/23/24 1000   Reason for Continuing Urinary Catheterization Placed by Urology Service 01/23/24 1000   CAUTI Prevention Bundle Securement Device in place with 1" slack;Intact seal between catheter & drainage tubing;Drainage bag/urimeter off the floor;Sheeting clip in use;No dependent loops or kinks;Drainage bag/urimeter not overfilled (<2/3 full);Drainage bag/urimeter below bladder 01/23/24 0800   Output (mL) 400 mL 01/23/24 0500   Number of days: 9            Fecal Incontinence  01/07/24 0357 (Active)   Application fecal incontinence  in place 01/23/24 1000   Drainage Method attached to drainage bag 01/23/24 1000   Securement to gravity 01/23/24 1000   Skin cleansed, skin barrier applied;no breakdown 01/23/24 1000   Tolerance no signs/symptoms of discomfort 01/23/24 1000   Stool (mL) 300 mL 01/23/24 0500   Number of days: 16 "       CBGs/Accuchecks: Yes     Precautions: Fall, Aspiration, and Seizures    Restraints: No     Isolation Precautions: N/A     Current Inpatient Medications   atorvastatin  10 mg Per G Tube Daily    carvediloL  3.125 mg Per G Tube BID    diltiaZEM  240 mg Per G Tube Daily    levETIRAcetam (Keppra) IV (PEDS and ADULTS)  1,500 mg Intravenous Q12H    losartan  50 mg Per G Tube Daily    pantoprazole  40 mg Intravenous BID    QUEtiapine  25 mg Per G Tube BID    rivaroxaban  20 mg Per G Tube Daily with dinner    vancomycin (VANCOCIN) IV (PEDS and ADULTS)  1,250 mg Intravenous Q12H         Current Intravenous Infusions   dexmedeTOMIDine (Precedex) infusion (titrating) 0.3 mcg/kg/hr (01/23/24 0527)    propofoL      Facility-Administered Medications as of 1/23/2024   Medication Dose Route Frequency Provider Last Rate Last Admin    [COMPLETED] acetaminophen 1,000 mg/100 mL (10 mg/mL) injection 1,000 mg  1,000 mg Intravenous Once Lashon Sterling MD   Stopped at 01/17/24 1258    [MAR Hold - Suspended Admission] acetaminophen tablet 650 mg  650 mg Oral Q4H PRN Jl Brower, FNP   650 mg at 01/17/24 0639    [MAR Hold - Suspended Admission] albuterol-ipratropium 2.5 mg-0.5 mg/3 mL nebulizer solution 3 mL  3 mL Nebulization Q4H PRN Jl Brower FNP        [MAR Hold - Suspended Admission] ALPRAZolam tablet 0.25 mg  0.25 mg Oral TID PRN Jl Brower, FNP        [MAR Hold - Suspended Admission] aspirin chewable tablet 81 mg  81 mg Per G Tube Daily LeonardaJl sheehan, FNP        atorvastatin tablet 10 mg  10 mg Per G Tube Daily Magdi Rivera DO   10 mg at 01/23/24 0853    [MAR Hold - Suspended Admission] atorvastatin tablet 40 mg  40 mg Per G Tube Daily Jl Brower, FNP        [MAR Hold - Suspended Admission] benzonatate capsule 100 mg  100 mg Oral TID PRN Jl Brower, FNP        [MAR Hold - Suspended Admission] bisacodyL suppository 10 mg  10 mg Rectal Daily PRN Jl Brower,  FNP        carvediloL tablet 3.125 mg  3.125 mg Per G Tube BID Magdi Rivera DO   3.125 mg at 24 0853    [MAR Hold - Suspended Admission] chlorhexidine 0.12 % solution 15 mL  15 mL Mouth/Throat BID Jl Brower, FNP   15 mL at 24 0900    dexmedetomidine (PRECEDEX) 400mcg/100mL 0.9% NaCL infusion  0-1.4 mcg/kg/hr Intravenous Continuous Sony Corbin MD   Stopped at 24 0759    [COMPLETED] diltiaZEM injection 20 mg  20 mg Intravenous ED 1 Time Lashon Sterling MD   20 mg at 24 1254    diltiaZEM tablet 240 mg  240 mg Per G Tube Daily Magdi Rivera DO   240 mg at 24 0853    [MAR Hold - Suspended Admission] diltiaZEM tablet 90 mg  90 mg Per G Tube Q6H Su Garcia FNP        [MAR Hold - Suspended Admission] docusate sodium capsule 100 mg  100 mg Oral BID LeonardaJl sheehan FNP   100 mg at 24 2140    [COMPLETED] fentaNYL injection 25 mcg  25 mcg Intravenous Once Magdi Rivera DO   25 mcg at 24 1823    [MAR Hold - Suspended Admission] hydrALAZINE injection 10 mg  10 mg Intravenous Q4H PRN Jl Brower, FNP   10 mg at 24 1107    hydrALAZINE injection 10 mg  10 mg Intravenous Q4H PRN Magdi Rivera DO        [MAR Hold - Suspended Admission] hydrOXYzine pamoate capsule 50 mg  50 mg Oral Nightly PRN Jl Brower FNP   50 mg at 24 2141    [COMPLETED] iopamidoL (ISOVUE-370) injection 100 mL  100 mL Intravenous ONCE PRN Lashon Sterling MD   100 mL at 24 1552    [MAR Hold - Suspended Admission] labetaloL injection 10 mg  10 mg Intravenous Q4H PRN Jl Brower, FNP   10 mg at 24 0727    labetaloL injection 10 mg  10 mg Intravenous Q4H PRN Magdi Rivera DO   10 mg at 24    [MAR Hold - Suspended Admission] levetiracetam 500 mg/5 mL (5 mL) liquid Soln 1,500 mg  1,500 mg Per G Tube BID Jl Brower FNP        [] levetiracetam 500 mg/5 mL (5 mL) liquid Soln              levETIRAcetam in NaCl (iso-os) IVPB 1,500 mg  1,500 mg Intravenous Q12H Magdi Rivera DO   Stopped at 01/23/24 0923    [MAR Hold - Suspended Admission] levETIRAcetam in NaCl (iso-os) IVPB 500 mg  500 mg Intravenous Once Kevin Sellers MD        [MAR Hold - Suspended Admission] levoFLOXacin 750 mg/150 mL IVPB 750 mg  750 mg Intravenous Q24H Jl Brower FNP        [COMPLETED] levoFLOXacin 750 mg/150 mL IVPB 750 mg  750 mg Intravenous Q24H Magdi Rivera DO   Stopped at 01/21/24 1917    [MAR Hold - Suspended Admission] LORazepam injection 2 mg  2 mg Intravenous Q2H Jl Brower FNP        [COMPLETED] LORazepam injection 2 mg  2 mg Intravenous ED 1 Time Lashon Sterlnig MD   2 mg at 01/17/24 1240    [MAR Hold - Suspended Admission] losartan tablet 50 mg  50 mg Per G Tube Daily Jl Brower FNP        losartan tablet 50 mg  50 mg Per G Tube Daily Magdi Rivera DO   50 mg at 01/23/24 0853    [COMPLETED] magnesium sulfate in dextrose IVPB (premix) 1 g  1 g Intravenous Once Magdi Rivera DO   Stopped at 01/20/24 0609    [MAR Hold - Suspended Admission] metoprolol injection 10 mg  10 mg Intravenous Q2H PRN Jl Brower FNP   10 mg at 01/17/24 1110    metoprolol injection 5 mg  5 mg Intravenous Q5 Min PRN Magdi Rivera DO        [MAR Hold - Suspended Admission] metoprolol succinate (TOPROL-XL) 24 hr tablet 200 mg  200 mg Oral BID Jl Brower FNP   200 mg at 01/16/24 2141    [COMPLETED] mupirocin 2 % ointment   Nasal BID Itz Francisco MD   Given at 01/22/24 0941    [MAR Hold - Suspended Admission] niCARdipine 40 mg/200 mL (0.2 mg/mL) infusion  0-15 mg/hr Intravenous Continuous Jl Brower FNP 25 mL/hr at 01/17/24 1131 5 mg/hr at 01/17/24 1131    [MAR Hold - Suspended Admission] nitroGLYCERIN SL tablet 0.4 mg  0.4 mg Sublingual Q5 Min PRN Jl Brower FNP        [MAR Hold - Suspended Admission] ondansetron disintegrating tablet 4 mg  4 mg Oral  Q6H PRN Jl Brower FNP        [MAR Hold - Suspended Admission] ondansetron injection 4 mg  4 mg Intravenous Q6H PRN Su Garcia FNP   4 mg at 24 0727    pantoprazole injection 40 mg  40 mg Intravenous BID Magdi Rivera DO   40 mg at 24 0853    [COMPLETED] pantoprazole injection 80 mg  80 mg Intravenous ED 1 Time Lashon Sterling MD   80 mg at 24 1416    [MAR Hold - Suspended Admission] pantoprazole suspension 40 mg  40 mg Per G Tube Daily Jl Brower FNP        [MAR Hold - Suspended Admission] polyethylene glycol packet 17 g  17 g Oral BID PRN Jl Brower FNP        polyethylene glycol packet 17 g  17 g Per G Tube Daily PRN Magdi Rivera DO        [COMPLETED] potassium bicarbonate disintegrating tablet 40 mEq  40 mEq Oral Once Itz Francisco MD   40 mEq at 24 1157    [COMPLETED] potassium bicarbonate disintegrating tablet 40 mEq  40 mEq Oral Once Itz Francisco MD   40 mEq at 24 1339    [COMPLETED] potassium chloride 20 mEq in 100 mL IVPB (FOR CENTRAL LINE ADMINISTRATION ONLY)  20 mEq Intravenous Q2H Magdi Rivera DO 50 mL/hr at 24 1147 20 mEq at 24 1147    [COMPLETED] potassium chloride 20 mEq in 100 mL IVPB (FOR CENTRAL LINE ADMINISTRATION ONLY)  20 mEq Intravenous Q2H Magdi Rivera DO   Stopped at 24 1125    [] potassium chloride 20 mEq in 100 mL IVPB (FOR CENTRAL LINE ADMINISTRATION ONLY)  20 mEq Intravenous Q1H Magdi Rivera DO   Stopped at 24 0922    potassium chloride 20 mEq in 100 mL IVPB (FOR CENTRAL LINE ADMINISTRATION ONLY)  20 mEq Intravenous Q2H Magdi Rivera DO 50 mL/hr at 24 1109 Rate Verify at 24 1109    [] potassium chloride SA CR tablet 20 mEq  20 mEq Oral Daily Jl Brower FNP        [MAR Hold - Suspended Admission] potassium chloride SA CR tablet 40 mEq  40 mEq Oral TID Jl Brower FNP        [MAR Hold - Suspended Admission] promethazine  tablet 25 mg  25 mg Oral Q6H PRN Jl Brower FNP        propofol (DIPRIVAN) 10 mg/mL infusion  0-50 mcg/kg/min (Dosing Weight) Intravenous Continuous Sony Corbin MD        QUEtiapine tablet 25 mg  25 mg Per G Tube BID Magdi Rivera DO   25 mg at 01/23/24 0853    [MAR Hold - Suspended Admission] rivaroxaban tablet 20 mg  20 mg Per G Tube Daily with dinner Jl Brower FNP        rivaroxaban tablet 20 mg  20 mg Per G Tube Daily with dinner Magdi Rivera DO   20 mg at 01/22/24 1708    [COMPLETED] sodium chloride 0.9% bolus 1,000 mL 1,000 mL  1,000 mL Intravenous ED 1 Time Lashon Sterling MD   Stopped at 01/17/24 1340    sodium chloride 0.9% flush 10 mL  10 mL Intravenous PRN Magdi Rivera DO        [MAR Hold - Suspended Admission] spironolactone tablet 50 mg  50 mg Per G Tube Daily Jl Brower FNP        [MAR Hold - Suspended Admission] torsemide tablet 10 mg  10 mg Per G Tube Daily Jl Brower FNP        [MAR Hold - Suspended Admission] vancomycin (VANCOCIN) 1,750 mg in dextrose 5 % (D5W) 500 mL IVPB  1,750 mg Intravenous Q12H Kevin Sellers MD   Stopped at 01/17/24 0115    vancomycin - pharmacy to dose   Intravenous pharmacy to manage frequency Magdi Rivera DO        vancomycin 1.25 g in dextrose 5% 250 mL IVPB (ready to mix)  1,250 mg Intravenous Q12H Italo Orozco .7 mL/hr at 01/23/24 1109 Rate Verify at 01/23/24 1109    [MAR Hold - Suspended Admission] vitamin D 1000 units tablet 1,000 Units  1,000 Units Per G Tube Daily Jl Brower FNP         Outpatient Medications as of 1/23/2024   Medication Sig Dispense Refill    aspirin 81 MG Chew 1 tablet (81 mg total) by Per G Tube route once daily. 30 tablet 11    atorvastatin (LIPITOR) 10 MG tablet 1 tablet (10 mg total) by Per G Tube route once daily. 90 tablet 3    carvediloL (COREG) 3.125 MG tablet 1 tablet (3.125 mg total) by Per G Tube route 2 (two) times daily. 60 tablet 11    diltiaZEM  "(CARDIZEM) 120 MG tablet 2 tablets (240 mg total) by Per G Tube route once daily. 60 tablet 11    [] guaiFENesin 100 mg/5 ml (ROBITUSSIN) 100 mg/5 mL syrup 20 mLs (400 mg total) by Per G Tube route every 8 (eight) hours. for 10 days 473 mL 0    losartan (COZAAR) 50 MG tablet 1 tablet (50 mg total) by Per G Tube route once daily. 90 tablet 3    polyethylene glycol (GLYCOLAX) 17 gram PwPk 17 g by Per G Tube route daily as needed for Constipation. 20 each 0    QUEtiapine (SEROQUEL) 25 MG Tab 1 tablet (25 mg total) by Per G Tube route 2 (two) times daily. 60 tablet 11        Cardiovascular:    Cardiovascular Review: Requires Review    Telemetry: Yes    Rhythm:  SR/ST    AICD: No      Respiratory:    Oxygen:  VIA TRACH / VENT    CPT/Frequency: N/A    Incentive Spirometer/Frequency: N/A    CPAP/Settings: N/A    BiPAP/Settings: N/A    Oxygen Saturation:  %    Suction Frequency:  Q4HRS      Vent Settings:   Mode:SIMV    Rate:10   TV:460   FIO2:30   PEEP:5   PS:   iTime:    Trial/HS Settings:   Mode:   Rate:   TV:   FIO2:   PEEP:   PS:       Nutrition:      Ht Readings from Last 3 Encounters:   24 5' 10" (1.778 m)   24 5' 10" (1.778 m)   23 5' 10.98" (1.803 m)     Wt Readings from Last 1 Encounters:   24 0700 117.9 kg (260 lb)   24 1219 117.9 kg (260 lb)       Feeding Status:  Tube feeding recommendation:      Impact Peptide 1.5 goal rate 50 ml/hr + 2 packets ProSource TF20 daily to provide  1660 kcal/d (100% est needs)  134 g protein/d (85% est needs)  770 ml free water/d (33% est needs)  (calculations based on estimated 20 hr/d run time)      With free water flushes of 210 q4hr, total water: 2030kcal (86% est needs)   Current Diet: NPO   Supplements:N/A   Tube Feeding:  SEE NOTES ABOVE   Flushes:  SEE NOTES ABOVE       Integumentary:    Integumentary: Surgical Incisions              Incision/Site 24 Right Head lateral (Active)   24   Present Prior to Hospital " Arrival?: Yes   Side: Right   Location: Head   Orientation: lateral   Incision Type:    Closure Method:    Additional Comments:    Removal Indication and Assessment:    Wound Outcome: Healed   Removal Indications:    Incision WDL WDL 01/23/24 0800   Dressing Appearance Open to air 01/23/24 0800   Drainage Amount None 01/23/24 0800   Drainage Characteristics/Odor No odor 01/17/24 0715   Appearance Pink;Intact 01/23/24 0800   Periwound Area Intact;Dry 01/22/24 2000   Wound Edges Approximated 01/22/24 2000   Care Cleansed with:;Sterile normal saline 01/12/24 2000   Dressing Reinforced 01/16/24 0800   Number of days: 7         Musculoskeletal:  ABLE TO MOVE RUE, NO MOVEMENT OF LUE   Transfer assist: Total Assistance    Weight Bearing Status: N/A    Comments: N/A    ADL Assist: Total Assistance    Special Equipment: N/A    Radiology:    Radiology (Last 168 hours)               01/20 0954 X-Ray Abdomen AP 1 View       01/17 1552 CT Abdomen Pelvis With IV Contrast NO Oral Contrast       01/17 1551 CT Head Without Contrast       01/17 1312 X-Ray Chest AP Portable       01/17 1310 X-Ray Abdomen AP 1 View (KUB)       01/17 0524 X-Ray Chest 1 View       01/17 0524 X-Ray Abdomen AP 1 View       12/19 0000 RADIOLOGY REPORT     12/19 0000 CARDIAC MONITORING STRIPS              X-Ray Abdomen AP 1 View  Narrative: EXAMINATION:  Single radiographic views of the ABDOMEN.    CLINICAL HISTORY:  distention;    TECHNIQUE:  Single radiographic views of the ABDOMEN.    COMPARISON:  KUB 01/17/2024.    FINDINGS:  Three supine views of the abdomen demonstrate a nonobstructed bowel gas pattern.  There is no pneumatosis.  There is no portal venous gas.  There is no pathologic calcification.  Impression: Nonobstructive bowel gas pattern.    Electronically signed by: Rico Escoto MD  Date:    01/20/2024  Time:    09:56      Lab/Cultures:    Blood Urea Nitrogen   Date Value Ref Range Status   01/23/2024 11.2 8.4 - 25.7 mg/dL Final   01/22/2024 11.8  "8.4 - 25.7 mg/dL Final     Creatinine   Date Value Ref Range Status   01/23/2024 0.80 0.73 - 1.18 mg/dL Final   01/22/2024 0.77 0.73 - 1.18 mg/dL Final     WBC   Date Value Ref Range Status   01/23/2024 6.87 4.50 - 11.50 x10(3)/mcL Final   01/22/2024 6.46 4.50 - 11.50 x10(3)/mcL Final   12/20/2023 25.65 x10(3)/mcL Final      CULTURE, BLOOD ([facility])   Date Value Ref Range Status   01/17/2024 No Growth at 5 days  Final   01/17/2024 No Growth at 5 days  Final     Urine Culture   Date Value Ref Range Status   01/12/2024 (A)  Final    >/= 100,000 colonies/ml Enterobacter cloacae complex     No results for input(s): "PH", "PCO2", "PO2", "HCO3", "POCSATURATED", "BE" in the last 72 hours.       "

## 2024-01-23 NOTE — NURSING
Nurses Note -- 4 Eyes      1/23/2024   9:37 AM      Skin assessed during: Daily Assessment      [x] No Altered Skin Integrity Present    [x]Prevention Measures Documented      [] Yes- Altered Skin Integrity Present or Discovered   [] LDA Added if Not in Epic (Describe Wound)   [] New Altered Skin Integrity was Present on Admit and Documented in LDA   [] Wound Image Taken    Wound Care Consulted? No    Attending Nurse:  Brenda Johnson RN/Staff Member:  MÓNICA Trujillo

## 2024-01-23 NOTE — PROGRESS NOTES
[facility] - Emergency Dept  Pulmonary Critical Care Note    Patient Name: Delio Daniel Jr.  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  Patient was recently transferred soon 01/16/2024 but presents to the emergency department this afternoon with complaints of seizure-like activity and coffee-ground emesis.  Patient is unable to provide any history himself therefore HPI primarily obtained from chart review.  Patient was seen by Dr. Dewitt this morning at the [facility] and per his note this morning patient did have seizure-like activity that was abated with Keppra.  It is also mentioned that patient did have hematemesis this morning but this can not be quantified but per Dr. Dominguez note patient was having projectile vomiting however this was un quantified.  Since this event, the patient's tube feedings have been placed on hold.  Furthermore patient did also apparently have some tachycardia post seizure and some hypertension of which was corrected was some IV hydralazine.  On evaluation patient in the emergency department his pulse is 101, blood pressure is 152/95, he remains on the ventilator on SIMV 10/460/5/30%.  He is also on infusions of diltiazem, Protonix, and Keppra.    Hospital Course/Significant events:      24  Hour Interval History:  No events overnight.  Remained stable.  Waiting for LTAC    Past Medical History:   Diagnosis Date    Arthritis     Atrial fibrillation     BPH (benign prostatic hyperplasia)     Cardiac arrest     Coronary artery disease     Cyst, kidney, acquired     Diverticulosis     Hyperlipidemia     Hypertension     MI (myocardial infarction)     Obesity     Steatosis of liver     Stroke        Past Surgical History:   Procedure Laterality Date    A-V CARDIAC PACEMAKER INSERTION Right     CARDIAC CATHETERIZATION      COLONOSCOPY W/ BIOPSIES      CRANIECTOMY Right 12/20/2023    Procedure: CRANIECTOMY;  Surgeon: Artem Can MD;  Location: [facility] OR;  Service: Neurosurgery;  Laterality: Right;    ESOPHAGOGASTRODUODENOSCOPY W/ PEG N/A 1/2/2024    Procedure: PEG;  Surgeon: Tnai Day MD;  Location: [facility] ENDOSCOPY;  Service: Gastroenterology;  Laterality: N/A;    excision of colon      TRACHEOSTOMY N/A 12/29/2023    Procedure: CREATION, TRACHEOSTOMY;  Surgeon: Patricia Winslow MD;  Location: [facility] OR;  Service: ENT;  Laterality: N/A;  REQ 1130 //  NEEDS 2 SCRUBS       Social History     Socioeconomic History    Marital status:     Number of children: 9   Occupational History    Occupation: retired   Tobacco Use    Smoking status: Never    Smokeless tobacco: Never   Substance and Sexual Activity    Alcohol use: Not Currently    Drug use: Not Currently    Sexual activity: Not Currently     Partners: Female     Social Determinants of Health     Financial Resource Strain: Low Risk  (10/19/2022)    Overall Financial Resource Strain (CARDIA)     Difficulty of Paying Living Expenses: Not hard at all   Food Insecurity: No Food Insecurity (10/19/2022)    Hunger Vital Sign     Worried About Running Out of Food in the Last Year: Never true     Ran Out of Food in the Last Year: Never true   Transportation Needs: No Transportation Needs (10/19/2022)    PRAPARE - Transportation     Lack of  Transportation (Medical): No     Lack of Transportation (Non-Medical): No   Physical Activity: Sufficiently Active (10/19/2022)    Exercise Vital Sign     Days of Exercise per Week: 6 days     Minutes of Exercise per Session: 60 min   Stress: No Stress Concern Present (10/19/2022)    [employer] - Occupational Stress Questionnaire     Feeling of Stress : Not at all   Social Connections: Unknown (10/19/2022)    Social Connection and Isolation Panel [NHANES]     Frequency of Communication with Friends and Family: More than three times a week     Frequency of Social Gatherings with Friends and Family: More than three times a week     Attends Yazidism Services: More than 4 times per year     Active Member of Clubs or Organizations: No     Attends Club or Organization Meetings: Never   Housing Stability: Low Risk  (10/19/2022)    Housing Stability Vital Sign     Unable to Pay for Housing in the Last Year: No     Number of Places Lived in the Last Year: 1     Unstable Housing in the Last Year: No           Current Outpatient Medications   Medication Instructions    aspirin 81 mg, Per G Tube, Daily    atorvastatin (LIPITOR) 10 mg, Per G Tube, Daily    carvediloL (COREG) 3.125 mg, Per G Tube, 2 times daily    diltiaZEM (CARDIZEM) 240 mg, Per G Tube, Daily    losartan (COZAAR) 50 mg, Per G Tube, Daily    polyethylene glycol (GLYCOLAX) 17 g, Per G Tube, Daily PRN    QUEtiapine (SEROQUEL) 25 mg, Per G Tube, 2 times daily       Current Inpatient Medications   atorvastatin  10 mg Per G Tube Daily    carvediloL  3.125 mg Per G Tube BID    diltiaZEM  240 mg Per G Tube Daily    levETIRAcetam (Keppra) IV (PEDS and ADULTS)  1,500 mg Intravenous Q12H    losartan  50 mg Per G Tube Daily    pantoprazole  40 mg Intravenous BID    QUEtiapine  25 mg Per G Tube BID    rivaroxaban  20 mg Per G Tube Daily with dinner    vancomycin (VANCOCIN) IV (PEDS and ADULTS)  1,250 mg Intravenous Q12H       Current  Intravenous Infusions   dexmedeTOMIDine (Precedex) infusion (titrating) 0.3 mcg/kg/hr (01/23/24 0527)    propofoL           Review of Systems   Unable to perform ROS: Medical condition          Objective:       Intake/Output Summary (Last 24 hours) at 1/23/2024 0545  Last data filed at 1/23/2024 0527  Gross per 24 hour   Intake 3844.18 ml   Output 3300 ml   Net 544.18 ml           Vital Signs (Most Recent):  Temp: 98.9 °F (37.2 °C) (01/23/24 0000)  Pulse: 84 (01/23/24 0455)  Resp: 16 (01/23/24 0455)  BP: 111/80 (01/23/24 0300)  SpO2: 95 % (01/23/24 0455)  Body mass index is 37.31 kg/m².  Weight: 117.9 kg (260 lb) Vital Signs (24h Range):  Temp:  [98.6 °F (37 °C)-100 °F (37.8 °C)] 98.9 °F (37.2 °C)  Pulse:  [] 84  Resp:  [1-30] 16  SpO2:  [93 %-100 %] 95 %  BP: ()/(65-96) 111/80     Physical Exam  Vitals and nursing note reviewed.   Constitutional:       General: He is not in acute distress.     Appearance: He is not toxic-appearing.      Comments: Intubated per trach   HENT:      Head: Normocephalic and atraumatic.   Cardiovascular:      Rate and Rhythm: Normal rate. Rhythm irregular.      Pulses: Normal pulses.      Heart sounds: No murmur heard.     No gallop.   Pulmonary:      Effort: No respiratory distress.      Breath sounds: No stridor. No wheezing, rhonchi or rales.   Abdominal:      General: Abdomen is flat. There is no distension.      Palpations: Abdomen is soft.   Genitourinary:     Comments: There is a Fernandez and rectal tube present  Musculoskeletal:         General: No swelling or deformity.      Left lower leg: No edema.   Skin:     General: Skin is warm.      Coloration: Skin is not jaundiced.      Findings: No bruising.   Neurological:      Comments:     Patient is essentially unresponsive/noninteractive  Spontaneously opens eyes  Does not track           Lines/Drains/Airways       Peripherally Inserted Central Catheter Line  Duration             PICC Triple Lumen 01/15/24 1424 right  basilic 7 days              Drain  Duration                  Gastrostomy/Enterostomy 01/02/24 1230 LUQ 20 days         Fecal Incontinence  01/07/24 0357 16 days         Urethral Catheter 01/14/24 0521 Silicone 9 days              Airway  Duration             Adult Surgical Airway 12/29/23 1229 24 days                    Significant Labs:    Lab Results   Component Value Date    WBC 6.87 01/23/2024    HGB 9.8 (L) 01/23/2024    HCT 31.3 (L) 01/23/2024    MCV 89.9 01/23/2024     01/23/2024           BMP  Lab Results   Component Value Date     01/23/2024    K 3.2 (L) 01/23/2024    CHLORIDE 105 01/23/2024    CO2 29 01/23/2024    BUN 11.2 01/23/2024    CREATININE 0.80 01/23/2024    CALCIUM 8.2 (L) 01/23/2024    AGAP 7.0 01/04/2024    EGFRNONAA 61 04/23/2022         ABG  Recent Labs   Lab 01/18/24  0605   PH 7.530*   PO2 130.0*   PCO2 41.0   HCO3 34.3*   POCBASEDEF 10.60*         Mechanical Ventilation Support:  Vent Mode: SIMV (01/23/24 0455)  Set Rate: 10 BPM (01/23/24 0455)  Vt Set: 460 mL (01/23/24 0455)  Pressure Support: 15 cmH20 (01/23/24 0455)  PEEP/CPAP: 5 cmH20 (01/23/24 0455)  Oxygen Concentration (%): 30 (01/23/24 0455)  Peak Airway Pressure: 25 cmH20 (01/23/24 0455)  Plateau Pressure: 458 cmH20 (01/22/24 1700)  Total Ve: 11 L/m (01/23/24 0455)  F/VT Ratio<105 (RSBI): (!) 26.19 (01/23/24 0455)      Significant Imaging:  I have reviewed the pertinent imaging within the past 24 hours.        Assessment/Plan:     Assessment  Seizure-like activity - resolved  Hematemesis - resolved  AFib RVR - resolved  CVA s/p right ICA and MCA M3 branch thrombectomy with hemorrhagic conversion s/p craniectomy  Right frontotemporal parietal DCH 12/23/2023  Hypoxic respiratory failure requiring intubation s/p trach  Superficial thrombosis in left cephalic vein seen on DVT ultrasound 12/26/2023  Enterobacter UTI on culture 01/14/2024  Pansensitive Raoultella Ornithinolytica on respiratory culture  01/14/2024  Staph epidermidis bacteremia    Plan  1. Patient remains on mechanical ventilation as he has not able to be weaned.  Daily T-piece trial  2. Continue Xarelto  3. Continue diltiazem 240 mg, Coreg 3.125 mg b.i.d., and losartan 50 mg  4. Continue IV vancomycin stop date 1/27/24  5. Replete potassium  6. Likely discharge back to LTAC soon    DVT Prophylaxis:  Xarelto  GI Prophylaxis:Protonix     32 minutes of critical care was time spent personally by me on the following activities: development of treatment plan with patient or surrogate and bedside caregivers, discussions with consultants, evaluation of patient's response to treatment, examination of patient, ordering and performing treatments and interventions, ordering and review of laboratory studies, ordering and review of radiographic studies, pulse oximetry, re-evaluation of patient's condition.  This critical care time did not overlap with that of any other provider or involve time for any procedures.     Magdi Rivera DO  Pulmonary Critical Care Medicine  DOS: 01/23/2024

## 2024-01-23 NOTE — PLAN OF CARE
01/23/24 0833   Discharge Reassessment   Assessment Type Discharge Planning Reassessment   Did the patient's condition or plan change since previous assessment? Yes   Discharge Plan discussed with: Adult children   Communicated EDER with patient/caregiver Date not available/Unable to determine   Discharge Plan A Long-term acute care facility (LTAC)   Discharge Plan B Long-term acute care facility (LTAC)   DME Needed Upon Discharge  none   Transition of Care Barriers None   Why the patient remains in the hospital Requires continued medical care   Post-Acute Status   Post-Acute Authorization Placement   Post-Acute Placement Status Referrals Sent   Discharge Delays None known at this time     Notified [facility] that patient will need to return. Sent referral. They will look over the documentation today. Notified daughter, Tony that referral was sent back to LTAC for patient. She is in agreement.

## 2024-01-23 NOTE — PROGRESS NOTES
Pharmacokinetic Assessment Follow Up: IV Vancomycin    Vancomycin serum concentration assessment(s):    The trough level was drawn correctly and can be used to guide therapy at this time. The measurement is within the desired definitive target range of 15 to 20 mcg/mL.    Vancomycin Regimen Plan:    Continue regimen to Vancomycin 1250 mg IV every 12 hours with next serum trough concentration measured at 2000 prior to 4th dose on 01/24      Drug levels (last 3 results):  Recent Labs   Lab Result Units 01/21/24  0906 01/21/24  1837 01/23/24  0810   Vanc Lvl Random ug/ml  --  15.8  --    Vancomycin Trough ug/ml 23.9*  --  19.6       Vancomycin Administrations:  vancomycin given in the last 96 hours                     vancomycin 1.25 g in dextrose 5% 250 mL IVPB (ready to mix) (mg) 1,250 mg New Bag 01/22/24 2110     1,250 mg New Bag  1047     1,250 mg New Bag 01/21/24 2050    vancomycin 1.5 g in dextrose 5 % 250 mL IVPB (ready to mix) (mg) 1,500 mg New Bag 01/20/24 2214     1,500 mg New Bag  0926     1,500 mg New Bag 01/19/24 2211    vancomycin 1.75 g in 5 % dextrose 500 mL IVPB (mg) 1,750 mg New Bag 01/19/24 0958                    Pharmacy will continue to follow and monitor vancomycin.    Please contact pharmacy at extension [phone] for questions regarding this assessment.    Thank you for the consult,   Gasper Rodriges       Patient brief summary:  Delio Daniel Jr. is a 68 y.o. male initiated on antimicrobial therapy with IV Vancomycin for treatment of bacteremia    The patient's current regimen is 1250 mg IV every 12 hours    Drug Allergies:   Review of patient's allergies indicates:  No Known Allergies    Actual Body Weight:  Wt Readings from Last 1 Encounters:   01/20/24 117.9 kg (260 lb)       Renal Function:   Estimated Creatinine Clearance: 115.3 mL/min (based on SCr of 0.8 mg/dL).,     Dialysis Method (if applicable):  N/A    CBC (last 72 hours):  Recent Labs   Lab Result Units 01/21/24  0106 01/22/24  0224  01/23/24  0042   WBC x10(3)/mcL 5.33 6.46 6.87   Hgb g/dL 9.9* 10.6* 9.8*   Hct % 32.4* 34.1* 31.3*   Platelet x10(3)/mcL 178 189 210   Mono % % 11.1 8.8 8.4   Eos % % 3.8 4.0 2.5   Basophil % % 0.6 0.6 0.6       Metabolic Panel (last 72 hours):  Recent Labs   Lab Result Units 01/21/24  0106 01/22/24  0224 01/23/24  0042   Sodium Level mmol/L 141 140 142   Potassium Level mmol/L 3.3* 3.5 3.2*   Chloride mmol/L 105 104 105   Carbon Dioxide mmol/L 29 28 29   Glucose Level mg/dL 119* 135* 140*   Blood Urea Nitrogen mg/dL 8.0* 11.8 11.2   Creatinine mg/dL 0.78 0.77 0.80   Albumin Level g/dL 2.3* 2.5* 2.5*   Bilirubin Total mg/dL 0.6 0.6 0.5   Alkaline Phosphatase unit/L 54 61 62   Aspartate Aminotransferase unit/L 23 21 20   Alanine Aminotransferase unit/L 24 23 22   Magnesium Level mg/dL 2.00 1.90 2.00   Phosphorus Level mg/dL 3.4 3.1 3.3       Microbiologic Results:  Microbiology Results (last 7 days)       Procedure Component Value Units Date/Time    Blood culture #1 **CANNOT BE ORDERED STAT** [[id number]]  (Normal) Collected: 01/17/24 1256    Order Status: Completed Specimen: Blood Updated: 01/22/24 1400     CULTURE, BLOOD ([facility]) No Growth at 5 days    Blood culture #2 **CANNOT BE ORDERED STAT** [[id number]]  (Normal) Collected: 01/17/24 1256    Order Status: Completed Specimen: Blood Updated: 01/22/24 1400     CULTURE, BLOOD ([facility]) No Growth at 5 days

## 2024-01-24 VITALS
BODY MASS INDEX: 37.22 KG/M2 | RESPIRATION RATE: 18 BRPM | SYSTOLIC BLOOD PRESSURE: 136 MMHG | HEIGHT: 70 IN | WEIGHT: 260 LBS | HEART RATE: 113 BPM | OXYGEN SATURATION: 100 % | TEMPERATURE: 98 F | DIASTOLIC BLOOD PRESSURE: 77 MMHG

## 2024-01-24 LAB
ALBUMIN SERPL-MCNC: 2.7 G/DL (ref 3.4–4.8)
ALBUMIN/GLOB SERPL: 0.8 RATIO (ref 1.1–2)
ALP SERPL-CCNC: 62 UNIT/L (ref 40–150)
ALT SERPL-CCNC: 24 UNIT/L (ref 0–55)
AST SERPL-CCNC: 24 UNIT/L (ref 5–34)
BASOPHILS # BLD AUTO: 0.05 X10(3)/MCL
BASOPHILS NFR BLD AUTO: 0.7 %
BILIRUB SERPL-MCNC: 0.7 MG/DL
BUN SERPL-MCNC: 11.3 MG/DL (ref 8.4–25.7)
CALCIUM SERPL-MCNC: 8.5 MG/DL (ref 8.8–10)
CHLORIDE SERPL-SCNC: 107 MMOL/L (ref 98–107)
CO2 SERPL-SCNC: 31 MMOL/L (ref 23–31)
CREAT SERPL-MCNC: 0.78 MG/DL (ref 0.73–1.18)
EOSINOPHIL # BLD AUTO: 0.17 X10(3)/MCL (ref 0–0.9)
EOSINOPHIL NFR BLD AUTO: 2.3 %
ERYTHROCYTE [DISTWIDTH] IN BLOOD BY AUTOMATED COUNT: 15.3 % (ref 11.5–17)
GFR SERPLBLD CREATININE-BSD FMLA CKD-EPI: >60 MLS/MIN/1.73/M2
GLOBULIN SER-MCNC: 3.6 GM/DL (ref 2.4–3.5)
GLUCOSE SERPL-MCNC: 114 MG/DL (ref 82–115)
HCT VFR BLD AUTO: 34.3 % (ref 42–52)
HGB BLD-MCNC: 10.4 G/DL (ref 14–18)
IMM GRANULOCYTES # BLD AUTO: 0.05 X10(3)/MCL (ref 0–0.04)
IMM GRANULOCYTES NFR BLD AUTO: 0.7 %
LYMPHOCYTES # BLD AUTO: 2.01 X10(3)/MCL (ref 0.6–4.6)
LYMPHOCYTES NFR BLD AUTO: 27.4 %
MAGNESIUM SERPL-MCNC: 1.9 MG/DL (ref 1.6–2.6)
MCH RBC QN AUTO: 28 PG (ref 27–31)
MCHC RBC AUTO-ENTMCNC: 30.3 G/DL (ref 33–36)
MCV RBC AUTO: 92.2 FL (ref 80–94)
MONOCYTES # BLD AUTO: 0.74 X10(3)/MCL (ref 0.1–1.3)
MONOCYTES NFR BLD AUTO: 10.1 %
NEUTROPHILS # BLD AUTO: 4.31 X10(3)/MCL (ref 2.1–9.2)
NEUTROPHILS NFR BLD AUTO: 58.8 %
NRBC BLD AUTO-RTO: 0 %
PHOSPHATE SERPL-MCNC: 3 MG/DL (ref 2.3–4.7)
PLATELET # BLD AUTO: 238 X10(3)/MCL (ref 130–400)
PMV BLD AUTO: 9.9 FL (ref 7.4–10.4)
POTASSIUM SERPL-SCNC: 3.4 MMOL/L (ref 3.5–5.1)
PROT SERPL-MCNC: 6.3 GM/DL (ref 5.8–7.6)
RBC # BLD AUTO: 3.72 X10(6)/MCL (ref 4.7–6.1)
SODIUM SERPL-SCNC: 143 MMOL/L (ref 136–145)
WBC # SPEC AUTO: 7.33 X10(3)/MCL (ref 4.5–11.5)

## 2024-01-24 PROCEDURE — 94003 VENT MGMT INPAT SUBQ DAY: CPT

## 2024-01-24 PROCEDURE — 63600175 PHARM REV CODE 636 W HCPCS: Performed by: INTERNAL MEDICINE

## 2024-01-24 PROCEDURE — 85025 COMPLETE CBC W/AUTO DIFF WBC: CPT | Performed by: INTERNAL MEDICINE

## 2024-01-24 PROCEDURE — 80053 COMPREHEN METABOLIC PANEL: CPT | Performed by: INTERNAL MEDICINE

## 2024-01-24 PROCEDURE — 27100171 HC OXYGEN HIGH FLOW UP TO 24 HOURS

## 2024-01-24 PROCEDURE — C9113 INJ PANTOPRAZOLE SODIUM, VIA: HCPCS | Performed by: INTERNAL MEDICINE

## 2024-01-24 PROCEDURE — 83735 ASSAY OF MAGNESIUM: CPT | Performed by: INTERNAL MEDICINE

## 2024-01-24 PROCEDURE — 25000003 PHARM REV CODE 250: Performed by: INTERNAL MEDICINE

## 2024-01-24 PROCEDURE — 27200966 HC CLOSED SUCTION SYSTEM

## 2024-01-24 PROCEDURE — 63600175 PHARM REV CODE 636 W HCPCS

## 2024-01-24 PROCEDURE — 99900026 HC AIRWAY MAINTENANCE (STAT)

## 2024-01-24 PROCEDURE — 94761 N-INVAS EAR/PLS OXIMETRY MLT: CPT

## 2024-01-24 PROCEDURE — 84100 ASSAY OF PHOSPHORUS: CPT | Performed by: INTERNAL MEDICINE

## 2024-01-24 PROCEDURE — 99900035 HC TECH TIME PER 15 MIN (STAT)

## 2024-01-24 RX ORDER — POTASSIUM CHLORIDE 14.9 MG/ML
20 INJECTION INTRAVENOUS ONCE
Status: COMPLETED | OUTPATIENT
Start: 2024-01-24 | End: 2024-01-24

## 2024-01-24 RX ORDER — LEVETIRACETAM 15 MG/ML
1500 INJECTION INTRAVASCULAR EVERY 12 HOURS
Qty: 1500 ML | Refills: 2 | Status: SHIPPED | OUTPATIENT
Start: 2024-01-24 | End: 2024-03-04

## 2024-01-24 RX ORDER — FENTANYL CITRATE 50 UG/ML
50 INJECTION, SOLUTION INTRAMUSCULAR; INTRAVENOUS
Status: DISCONTINUED | OUTPATIENT
Start: 2024-01-24 | End: 2024-01-24 | Stop reason: HOSPADM

## 2024-01-24 RX ORDER — CARVEDILOL 6.25 MG/1
6.25 TABLET ORAL 2 TIMES DAILY
Qty: 60 TABLET | Refills: 11 | Status: SHIPPED | OUTPATIENT
Start: 2024-01-24 | End: 2024-03-04

## 2024-01-24 RX ORDER — CHLORPROMAZINE HYDROCHLORIDE 25 MG/ML
25 INJECTION INTRAMUSCULAR ONCE
Status: COMPLETED | OUTPATIENT
Start: 2024-01-24 | End: 2024-01-24

## 2024-01-24 RX ORDER — FENTANYL CITRATE 50 UG/ML
50 INJECTION, SOLUTION INTRAMUSCULAR; INTRAVENOUS
Status: DISPENSED | OUTPATIENT
Start: 2024-01-24 | End: 2024-01-24

## 2024-01-24 RX ORDER — CARVEDILOL 3.12 MG/1
6.25 TABLET ORAL 2 TIMES DAILY
Status: DISCONTINUED | OUTPATIENT
Start: 2024-01-24 | End: 2024-01-24 | Stop reason: HOSPADM

## 2024-01-24 RX ORDER — PANTOPRAZOLE SODIUM 40 MG/1
40 TABLET, DELAYED RELEASE ORAL 2 TIMES DAILY
Qty: 60 TABLET | Refills: 11 | Status: SHIPPED | OUTPATIENT
Start: 2024-01-24 | End: 2024-03-04

## 2024-01-24 RX ADMIN — LEVETIRACETAM INJECTION 1500 MG: 15 INJECTION INTRAVENOUS at 08:01

## 2024-01-24 RX ADMIN — CHLORPROMAZINE HYDROCHLORIDE 25 MG: 25 INJECTION INTRAMUSCULAR at 05:01

## 2024-01-24 RX ADMIN — CARVEDILOL 3.12 MG: 3.12 TABLET, FILM COATED ORAL at 08:01

## 2024-01-24 RX ADMIN — LOSARTAN POTASSIUM 50 MG: 50 TABLET, FILM COATED ORAL at 08:01

## 2024-01-24 RX ADMIN — QUETIAPINE FUMARATE 25 MG: 25 TABLET ORAL at 08:01

## 2024-01-24 RX ADMIN — POTASSIUM CHLORIDE 20 MEQ: 14.9 INJECTION, SOLUTION INTRAVENOUS at 04:01

## 2024-01-24 RX ADMIN — VANCOMYCIN HYDROCHLORIDE 1250 MG: 1.25 INJECTION, POWDER, LYOPHILIZED, FOR SOLUTION INTRAVENOUS at 08:01

## 2024-01-24 RX ADMIN — ATORVASTATIN CALCIUM 10 MG: 10 TABLET, FILM COATED ORAL at 08:01

## 2024-01-24 RX ADMIN — FENTANYL CITRATE 50 MCG: 50 INJECTION, SOLUTION INTRAMUSCULAR; INTRAVENOUS at 07:01

## 2024-01-24 RX ADMIN — PANTOPRAZOLE SODIUM 40 MG: 40 INJECTION, POWDER, FOR SOLUTION INTRAVENOUS at 08:01

## 2024-01-24 RX ADMIN — DILTIAZEM HYDROCHLORIDE 240 MG: 60 TABLET, FILM COATED ORAL at 08:01

## 2024-01-24 NOTE — NURSING
Nurses Note -- 4 Eyes      1/24/2024   2:24 AM      Skin assessed during: Daily Assessment      [x] No Altered Skin Integrity Present    [x]Prevention Measures Documented      [] Yes- Altered Skin Integrity Present or Discovered   [] LDA Added if Not in Epic (Describe Wound)   [] New Altered Skin Integrity was Present on Admit and Documented in LDA   [] Wound Image Taken    Wound Care Consulted? No    Attending Nurse:  MÓNICA Frey     Second RN/Staff Member:  AAKASH Murray

## 2024-01-24 NOTE — NURSING
Nurses Note -- 4 Eyes      1/24/2024   9:47 AM      Skin assessed during: Daily Assessment      [x] No Altered Skin Integrity Present    [x]Prevention Measures Documented      [] Yes- Altered Skin Integrity Present or Discovered   [] LDA Added if Not in Epic (Describe Wound)   [] New Altered Skin Integrity was Present on Admit and Documented in LDA   [] Wound Image Taken    Wound Care Consulted? No    Attending Nurse:  Brenda Johnson RN/Staff Member:  MÓNICA Trujillo

## 2024-01-24 NOTE — PLAN OF CARE
01/24/24 1348   Final Note   Assessment Type Final Discharge Note   Anticipated Discharge Disposition Long Term   Post-Acute Status   Post-Acute Authorization Placement   Post-Acute Placement Status Set-up Complete/Auth obtained   Discharge Delays None known at this time     Patient being discharged back to [facility] today. Called patient into will call Viktorian. Gave information to nurse with face sheet. Attempted to call patient's daughter with update. Left voicemail for her to return my call.

## 2024-01-24 NOTE — PROGRESS NOTES
[facility] - Emergency Dept  Pulmonary Critical Care Note    Patient Name: Delio Daniel Jr.  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  Patient was recently transferred soon 01/16/2024 but presents to the emergency department this afternoon with complaints of seizure-like activity and coffee-ground emesis.  Patient is unable to provide any history himself therefore HPI primarily obtained from chart review.  Patient was seen by Dr. Dewitt this morning at the [facility] and per his note this morning patient did have seizure-like activity that was abated with Keppra.  It is also mentioned that patient did have hematemesis this morning but this can not be quantified but per Dr. Dominguez note patient was having projectile vomiting however this was un quantified.  Since this event, the patient's tube feedings have been placed on hold.  Furthermore patient did also apparently have some tachycardia post seizure and some hypertension of which was corrected was some IV hydralazine.  On evaluation patient in the emergency department his pulse is 101, blood pressure is 152/95, he remains on the ventilator on SIMV 10/460/5/30%.  He is also on infusions of diltiazem, Protonix, and Keppra.    Hospital Course/Significant events:      24  Hour Interval History:  No events overnight.  Remained stable.  Waiting for LTAC.    Past Medical History:   Diagnosis Date    Arthritis     Atrial fibrillation     BPH (benign prostatic hyperplasia)     Cardiac arrest     Coronary artery disease     Cyst, kidney, acquired     Diverticulosis     Hyperlipidemia     Hypertension     MI (myocardial infarction)     Obesity     Steatosis of liver     Stroke        Past Surgical History:   Procedure Laterality Date    A-V CARDIAC PACEMAKER INSERTION Right     CARDIAC CATHETERIZATION      COLONOSCOPY W/ BIOPSIES      CRANIECTOMY Right 12/20/2023    Procedure: CRANIECTOMY;  Surgeon: Artem Can MD;  Location: [facility] OR;  Service: Neurosurgery;  Laterality: Right;    ESOPHAGOGASTRODUODENOSCOPY W/ PEG N/A 1/2/2024    Procedure: PEG;  Surgeon: Tani Day MD;  Location: [facility] ENDOSCOPY;  Service: Gastroenterology;  Laterality: N/A;    excision of colon      TRACHEOSTOMY N/A 12/29/2023    Procedure: CREATION, TRACHEOSTOMY;  Surgeon: Patricia Winslow MD;  Location: [facility] OR;  Service: ENT;  Laterality: N/A;  REQ 1130 //  NEEDS 2 SCRUBS       Social History     Socioeconomic History    Marital status:     Number of children: 9   Occupational History    Occupation: retired   Tobacco Use    Smoking status: Never    Smokeless tobacco: Never   Substance and Sexual Activity    Alcohol use: Not Currently    Drug use: Not Currently    Sexual activity: Not Currently     Partners: Female     Social Determinants of Health     Financial Resource Strain: Low Risk  (10/19/2022)    Overall Financial Resource Strain (CARDIA)     Difficulty of Paying Living Expenses: Not hard at all   Food Insecurity: No Food Insecurity (10/19/2022)    Hunger Vital Sign     Worried About Running Out of Food in the Last Year: Never true     Ran Out of Food in the Last Year: Never true   Transportation Needs: No Transportation Needs (10/19/2022)    PRAPARE - Transportation     Lack of  Transportation (Medical): No     Lack of Transportation (Non-Medical): No   Physical Activity: Sufficiently Active (10/19/2022)    Exercise Vital Sign     Days of Exercise per Week: 6 days     Minutes of Exercise per Session: 60 min   Stress: No Stress Concern Present (10/19/2022)    [employer] [place] [employer] - Occupational Stress Questionnaire     Feeling of Stress : Not at all   Social Connections: Unknown (10/19/2022)    Social Connection and Isolation Panel [NHANES]     Frequency of Communication with Friends and Family: More than three times a week     Frequency of Social Gatherings with Friends and Family: More than three times a week     Attends Hoahaoism Services: More than 4 times per year     Active Member of Clubs or Organizations: No     Attends Club or Organization Meetings: Never   Housing Stability: Low Risk  (10/19/2022)    Housing Stability Vital Sign     Unable to Pay for Housing in the Last Year: No     Number of Places Lived in the Last Year: 1     Unstable Housing in the Last Year: No           Current Outpatient Medications   Medication Instructions    aspirin 81 mg, Per G Tube, Daily    atorvastatin (LIPITOR) 10 mg, Per G Tube, Daily    carvediloL (COREG) 3.125 mg, Per G Tube, 2 times daily    diltiaZEM (CARDIZEM) 240 mg, Per G Tube, Daily    losartan (COZAAR) 50 mg, Per G Tube, Daily    polyethylene glycol (GLYCOLAX) 17 g, Per G Tube, Daily PRN    QUEtiapine (SEROQUEL) 25 mg, Per G Tube, 2 times daily       Current Inpatient Medications   atorvastatin  10 mg Per G Tube Daily    carvediloL  3.125 mg Per G Tube BID    diltiaZEM  240 mg Per G Tube Daily    levETIRAcetam (Keppra) IV (PEDS and ADULTS)  1,500 mg Intravenous Q12H    losartan  50 mg Per G Tube Daily    pantoprazole  40 mg Intravenous BID    QUEtiapine  25 mg Per G Tube BID    rivaroxaban  20 mg Per G Tube Daily with dinner    vancomycin (VANCOCIN) IV (PEDS and ADULTS)  1,250 mg Intravenous Q12H       Current  Intravenous Infusions   dexmedeTOMIDine (Precedex) infusion (titrating) Stopped (01/23/24 9769)    propofoL           Review of Systems   Unable to perform ROS: Medical condition          Objective:       Intake/Output Summary (Last 24 hours) at 1/24/2024 0600  Last data filed at 1/24/2024 0430  Gross per 24 hour   Intake 1828.3 ml   Output 5025 ml   Net -3196.7 ml           Vital Signs (Most Recent):  Temp: 98.4 °F (36.9 °C) (01/24/24 0400)  Pulse: 95 (01/24/24 0500)  Resp: (!) 33 (01/24/24 0500)  BP: (!) 138/99 (01/24/24 0400)  SpO2: 100 % (01/24/24 0500)  Body mass index is 37.31 kg/m².  Weight: 117.9 kg (260 lb) Vital Signs (24h Range):  Temp:  [98.4 °F (36.9 °C)-100.6 °F (38.1 °C)] 98.4 °F (36.9 °C)  Pulse:  [] 95  Resp:  [4-40] 33  SpO2:  [77 %-100 %] 100 %  BP: (111-163)/() 138/99     Physical Exam  Vitals and nursing note reviewed.   Constitutional:       General: He is not in acute distress.     Appearance: He is not toxic-appearing.      Comments: Intubated per trach   HENT:      Head: Normocephalic and atraumatic.   Cardiovascular:      Rate and Rhythm: Normal rate. Rhythm irregular.      Pulses: Normal pulses.      Heart sounds: No murmur heard.     No gallop.   Pulmonary:      Effort: No respiratory distress.      Breath sounds: No stridor. No wheezing, rhonchi or rales.   Abdominal:      General: Bowel sounds are normal. There is distension.      Palpations: Abdomen is soft.   Genitourinary:     Comments: There is a Fernandez and rectal tube present  Musculoskeletal:         General: No swelling or deformity.      Left lower leg: No edema.   Skin:     General: Skin is warm.      Coloration: Skin is not jaundiced.      Findings: No bruising.   Neurological:      Comments:     Patient is essentially unresponsive/noninteractive  Spontaneously opens eyes  Does not track       Lines/Drains/Airways       Peripherally Inserted Central Catheter Line  Duration             PICC Triple Lumen 01/15/24 1663  right basilic 8 days              Drain  Duration                  Gastrostomy/Enterostomy 01/02/24 1230 LUQ 21 days         Fecal Incontinence  01/07/24 0357 17 days         Urethral Catheter 01/14/24 0521 Silicone 10 days              Airway  Duration             Adult Surgical Airway 12/29/23 1229 25 days                    Significant Labs:    Lab Results   Component Value Date    WBC 7.33 01/24/2024    HGB 10.4 (L) 01/24/2024    HCT 34.3 (L) 01/24/2024    MCV 92.2 01/24/2024     01/24/2024           BMP  Lab Results   Component Value Date     01/24/2024    K 3.4 (L) 01/24/2024    CHLORIDE 107 01/24/2024    CO2 31 01/24/2024    BUN 11.3 01/24/2024    CREATININE 0.78 01/24/2024    CALCIUM 8.5 (L) 01/24/2024    AGAP 7.0 01/04/2024    EGFRNONAA 61 04/23/2022         ABG  Recent Labs   Lab 01/18/24  0605   PH 7.530*   PO2 130.0*   PCO2 41.0   HCO3 34.3*   POCBASEDEF 10.60*         Mechanical Ventilation Support:  Vent Mode: SIMV (01/24/24 0500)  Set Rate: 10 BPM (01/24/24 0500)  Vt Set: 460 mL (01/24/24 0500)  Pressure Support: 8 cmH20 (01/24/24 0500)  PEEP/CPAP: 5 cmH20 (01/24/24 0500)  Oxygen Concentration (%): 30 (01/24/24 0400)  Peak Airway Pressure: 24 cmH20 (01/24/24 0500)  Plateau Pressure: 606 cmH20 (01/23/24 1115)  Total Ve: 10.1 L/m (01/24/24 0500)  F/VT Ratio<105 (RSBI): (!) 72.37 (01/24/24 0500)      Significant Imaging:  I have reviewed the pertinent imaging within the past 24 hours.      Assessment/Plan:     Assessment  Seizure-like activity - resolved  Hematemesis - resolved  AFib RVR - resolved  CVA s/p right ICA and MCA M3 branch thrombectomy with hemorrhagic conversion s/p craniectomy  Right frontotemporal parietal DCH 12/23/2023  Hypoxic respiratory failure requiring intubation s/p trach  Superficial thrombosis in left cephalic vein seen on DVT ultrasound 12/26/2023  Enterobacter UTI on culture 01/14/2024  Pansensitive Raoultella Ornithinolytica on respiratory culture  01/14/2024  Staph epidermidis bacteremia    Plan  1. Patient remains on mechanical ventilation as he has not able to be weaned.  Daily T-piece trial  2. Continue Xarelto  3. Continue diltiazem 240 mg, Coreg 3.125 mg b.i.d., and losartan 50 mg  4. Continue IV vancomycin stop date 1/27/24  5. Replete potassium  6. Likely discharge back to LTAC soon    DVT Prophylaxis:  Xarelto  GI Prophylaxis:Protonix     32 minutes of critical care was time spent personally by me on the following activities: development of treatment plan with patient or surrogate and bedside caregivers, discussions with consultants, evaluation of patient's response to treatment, examination of patient, ordering and performing treatments and interventions, ordering and review of laboratory studies, ordering and review of radiographic studies, pulse oximetry, re-evaluation of patient's condition.  This critical care time did not overlap with that of any other provider or involve time for any procedures.     Estefania Goldberg MD  Pulmonary Critical Care Medicine  DOS: 01/24/2024

## 2024-01-24 NOTE — PLAN OF CARE
[place] with [facility] states authorization is currently pending for return.

## 2024-01-24 NOTE — DISCHARGE SUMMARY
[facility] Discharge Summary    Admitting Physician: Itz Francisco MD  Attending Physician: Italo Orozco MD  Date of Admit: 1/17/2024  Date of Discharge: 1/24/2024    Condition: Stable  Outcome: Patient tolerated treatment/procedure well without complication and is now ready for discharge.  DISPOSITION: Long Term Care          Discharge Diagnoses     Patient Active Problem List   Diagnosis    Neuroendocrine carcinoma of small bowel    Nodule of left lung    Hypertension    Hyperlipidemia LDL goal <70    Hypokalemia    Coronary artery disease involving native heart without angina pectoris    Second degree AV block    Cardiac arrest with ventricular fibrillation    Cardiac pacemaker in situ    History of deep vein thrombosis (DVT) of lower extremity    Current use of long term anticoagulation    Bilateral lower extremity edema    Atrial fibrillation    Benign prostatic hyperplasia    Myocardial infarction    Stroke    Acute hypoxemic respiratory failure    Seizure-like activity    Coffee ground emesis       Principal Problem:  Seizure-like activity    Consultants and Procedures     Consultants:  IP CONSULT TO NEUROLOGY  IP CONSULT TO GI  IP CONSULT TO REGISTERED DIETITIAN/NUTRITIONIST  PHARMACY TO DOSE VANCOMYCIN CONSULT  IP CONSULT TO SOCIAL WORK/CASE MANAGEMENT    Procedures:   * No surgery found *     Brief Admission History      This is a 68-year-old  male with a past medical history of AFib (on Xarelto), hypertension, CAD, heart failure with preserved ejection fraction (55%), PM placement in 2017 for 2nd degree AVB replaced with dcICD 5/2018 for Vfib arrest in 3/2018 d/t prolonged QT and hypokalemia; BPH, fatty liver, and 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  Patient was recently transferred soon  "01/16/2024 but presents to the emergency department this afternoon with complaints of seizure-like activity and coffee-ground emesis.  Patient is unable to provide any history himself therefore HPI primarily obtained from chart review.  Patient was seen by Dr. Dewitt this morning at the [facility] and per his note this morning patient did have seizure-like activity that was abated with Keppra.  It is also mentioned that patient did have hematemesis this morning but this can not be quantified but per Dr. Dominguez note patient was having projectile vomiting however this was un quantified. Patient was transferred to the emergency department and was admitted to the ICU.     Hospital Course with Pertinent Findings     Patient was started on Keppra for his seizure-like activity. Tube feeds were placed on hold on admission due to projectile emesis. GI was consulted and evaluated patient. They noted there was no indication for endoscopy as patient was hemodynamically stable. Patient was then cleared to resume tube feeds and tolerated them well. GI also cleared patient to resume anticoagulation at that time. Due to seizure, Neurology was consulted. Patient did not have any further seizure episodes while on Keppra. They did recommend starting Vimpat if further seizure activity was noted but was not needed. Since being started on treatment, patient has been stable with no acute changes. Patient will continue to need treatment with Vancomycin until 1/27/24 for completion of antibiotic treatment for Staph bacteremia. At this time, patient is medically stable to return back to [facility]. Patient     Discharge physical exam:  Vitals  BP: (!) 133/99  Temp: 98.3 °F (36.8 °C)  Temp Source: Oral  Pulse: 92  Resp: 18  SpO2: 100 %  Height: 5' 10" (177.8 cm)  Weight: 117.9 kg (260 lb)    Physical Exam   Vitals and nursing note reviewed.   Constitutional:       General: He is not in acute distress.     Appearance: He is not toxic-appearing.      " Comments: Intubated per trach   HENT:      Head: Normocephalic and atraumatic.   Cardiovascular:      Rate and Rhythm: Normal rate. Rhythm irregular.      Pulses: Normal pulses.      Heart sounds: No murmur heard.     No gallop.   Pulmonary:      Effort: No respiratory distress.      Breath sounds: No stridor. No wheezing, rhonchi or rales.   Abdominal:      General: Bowel sounds are normal. There is distension.      Palpations: Abdomen is soft.   Genitourinary:     Comments: There is a Fernandez and rectal tube present  Musculoskeletal:         General: No swelling or deformity.      Left lower leg: No edema.   Skin:     General: Skin is warm.      Coloration: Skin is not jaundiced.      Findings: No bruising.   Neurological:      Comments:     TIME SPENT ON DISCHARGE: 60 minutes    Discharge Medications        Medication List        START taking these medications      levETIRAcetam in NaCl (iso-os) 1,500 mg/100 mL Pgbk  Commonly known as: KEPPRA  Inject 100 mLs (1,500 mg total) into the vein every 12 (twelve) hours.     pantoprazole 40 MG tablet  Commonly known as: PROTONIX  Take 1 tablet (40 mg total) by mouth 2 (two) times daily.     rivaroxaban 20 mg Tab  Commonly known as: XARELTO  1 tablet (20 mg total) by Per G Tube route daily with dinner or evening meal.     VANCOMYCIN 1.25 G/250 ML D5W (READY TO MIX)  Inject 250 mLs (1,250 mg total) into the vein every 12 (twelve) hours.            CHANGE how you take these medications      carvediloL 6.25 MG tablet  Commonly known as: COREG  1 tablet (6.25 mg total) by Per G Tube route 2 (two) times daily.  What changed:   medication strength  how much to take            CONTINUE taking these medications      aspirin 81 MG Chew  1 tablet (81 mg total) by Per G Tube route once daily.     atorvastatin 10 MG tablet  Commonly known as: LIPITOR  1 tablet (10 mg total) by Per G Tube route once daily.     diltiaZEM 120 MG tablet  Commonly known as: CARDIZEM  2 tablets (240 mg  total) by Per G Tube route once daily.     losartan 50 MG tablet  Commonly known as: COZAAR  1 tablet (50 mg total) by Per G Tube route once daily.     polyethylene glycol 17 gram Pwpk  Commonly known as: GLYCOLAX  17 g by Per G Tube route daily as needed for Constipation.     QUEtiapine 25 MG Tab  Commonly known as: SEROQUEL  1 tablet (25 mg total) by Per G Tube route 2 (two) times daily.            STOP taking these medications      guaiFENesin 100 mg/5 ml 100 mg/5 mL syrup  Commonly known as: ROBITUSSIN               Where to Get Your Medications        These medications were sent to [address] [address]      Phone: [phone]   carvediloL 6.25 MG tablet  levETIRAcetam in NaCl (iso-os) 1,500 mg/100 mL Pgbk  pantoprazole 40 MG tablet  rivaroxaban 20 mg Tab  VANCOMYCIN 1.25 G/250 ML D5W (READY TO MIX)         Discharge Information:       Discharge back to LTAC at this time  Continue Vancomycin til 1/27/24      Sony Corbin MD  [facility], HO-2

## 2024-01-24 NOTE — PLAN OF CARE
Problem: Adult Inpatient Plan of Care  Goal: Plan of Care Review  1/24/2024 1353 by Brenda Daniel RN  Outcome: Met  1/24/2024 0948 by Brenda Daniel RN  Outcome: Ongoing, Progressing  Goal: Patient-Specific Goal (Individualized)  1/24/2024 1353 by Brenda Daniel RN  Outcome: Met  1/24/2024 0948 by Brenda Daniel RN  Outcome: Ongoing, Progressing  Goal: Absence of Hospital-Acquired Illness or Injury  1/24/2024 1353 by Brenda Daniel RN  Outcome: Met  1/24/2024 0948 by Brenda Daniel RN  Outcome: Ongoing, Progressing  Goal: Optimal Comfort and Wellbeing  1/24/2024 1353 by Brenda Daniel RN  Outcome: Met  1/24/2024 0948 by Brenda Daniel RN  Outcome: Ongoing, Progressing  Goal: Readiness for Transition of Care  1/24/2024 1353 by Brenda Daniel RN  Outcome: Met  1/24/2024 0948 by Brenda Daniel RN  Outcome: Ongoing, Progressing     Problem: Skin Injury Risk Increased  Goal: Skin Health and Integrity  1/24/2024 1353 by Brenda Daniel RN  Outcome: Met  1/24/2024 0948 by Brenda Daniel RN  Outcome: Ongoing, Progressing     Problem: Infection  Goal: Absence of Infection Signs and Symptoms  1/24/2024 1353 by Brenda Daniel RN  Outcome: Met  1/24/2024 0948 by Brenda Daniel RN  Outcome: Ongoing, Progressing

## 2024-01-24 NOTE — NURSING
Report called to Kevin at [facility]. VSS. NAD. PICC, underwood, & rectal tube left intact per facility request. Tele DC. Pt belongings sent with pt. Pt transported via ambulance. Attempted to call daughters Beth and Ari, left a voice mail.

## 2024-01-28 LAB
BACTERIA BLD CULT: NORMAL
BACTERIA BLD CULT: NORMAL

## 2024-02-28 NOTE — PLAN OF CARE
[facility] sent for authorization today for return.   [facility]  CARDIOLOGY FOLLOW UP        No chief complaint on file.      JUANA Nuñez is a 63 year old female with a past medical history significant for hyperlipidemia and GERD who presents to the clinic for follow up.      Since her last visit she ***     She has a history of significant myalgias with statin therapy.  She reports she has been on simvastatin, rosuvastatin and atorvastatin in the past.  She last took a statin drug in 2/2021.     She has a family history of coronary artery disease in her sister who is 56 years old and recently underwent CABG.  She denies any other family history of cardiac disease.     She is a never smoker.  She has an occasional glass wine.  She is .  She works at the [employer] in [place]           ASSESSMENT/PLAN  No diagnosis found.      TESTS / PROCEDURES REVIEWED:  EKG 1/20/2020  Sinus bradycardia     Stress Test 5/19/2021  Normal treadmill stress echocardiogram.  No ECG or echocardiographic evidence of myocardial ischemia with exercise stress.  Powers treadmill score = 5.5. Duke treadmill score of >5 indicates good long term prognosis with >99% annual survival rate.     Echocardiogram  Date:  03/03/2023  Normal left ventricular size and systolic function, EF 60 %, GLS -20.3 %.  Normal left ventricular wall thickness.  Indeterminate diastolic function.  Pulmonary artery systolic pressure; 22 mmHg.  Trivial-mild tricuspid valve regurgitation.  No significant valve disease.  Date:  5/3/2013  Mildly increased left ventricular diastolic volume. Mildly increased left ventricular systolic volume. normal left ventricular wall  thickness. Normal left ventricular systolic function. Left ventricular ejection fraction, 60 %. Normal diastolic function.  No significant valve abnormalities.    IMPRESSION/PLAN      1. Coronary artery calcium score 3/12/21  Total coronary artery calcium score is 1. 66% of people  matched for age,  gender, and race/ethnicity who are free of clinical cardiovascular disease  and treated diabetes had less calcium than was detected in this study.      2. Hyperlipidemia  - Lipid panel in 11/2021 reviewed.  She has a history of statin intolerance - severe myalgias with multiple statin drugs. She has been on medication for hyperlipidemia since 7/2021 (Alirocumab/Praluent)     LDL 89 last checked 08/2023.   Continue Repatha and Zetia.  Unfortunately her insurance changed and she would have to pay out-of-pocket for the Repatha which she is unable to do.  Discussed significant weight loss to try to lower lipids.     3. Exertional shortness of breath  - Her calcium score fortunately is only 1. Treadmill stress echocardiogram on  05/2021 without evidence of ischemia.  Regular activity recommended for now.       4. Weight loss 238 and now ***#  She is trying to lose weight with diet and exercise.        ORDERS  ***    Follow up: ***    ALLERGIES:   Allergen Reactions    Wellbutrin [Bupropion Hcl]     Penicillin G RASH and PRURITUS    Rosuvastatin Calcium MYALGIA       MEDICATIONS   Current Outpatient Medications   Medication Sig Dispense Refill    fluticasone (FLONASE) 50 MCG/ACT nasal spray SHAKE LIQUID WELL AND USE 2 SPRAYS IN EACH NOSTRIL ONCE DAILY. 48 g 2    pravastatin (PRAVACHOL) 20 MG tablet Take 1 tablet by mouth at bedtime. 90 tablet 0    loratadine (CLARITIN) 10 MG tablet TAKE 1 TABLET BY MOUTH DAILY 90 tablet 0    sertraline (ZOLOFT) 25 MG tablet Take 1 tablet by mouth daily. 90 tablet 2    omeprazole (PriLOSEC) 40 MG capsule Take 1 capsule by mouth daily (before breakfast). 90 capsule 3    ezetimibe (Zetia) 10 MG tablet Take 1 tablet by mouth daily. 90 tablet 3    triamcinolone (ARISTOCORT) 0.1 % cream Apply topically 2 times daily. (Patient taking differently: Apply topically 2 times daily as needed.) 15 g 0    albuterol 108 (90 Base) MCG/ACT inhaler Inhale 2 puffs into the lungs every 4  hours as needed for Shortness of Breath or Wheezing (for coughing and shortness of breath). 1 each 0    polyethylene glycol (MIRALAX) 17 GM/SCOOP powder Take 17 g by mouth as needed. Stir and dissolve powder in any 4 to 8 ounces of beverage, then drink.      acetaminophen (TYLENOL) 325 MG tablet Take 650 mg by mouth every 4 hours as needed for Pain.       No current facility-administered medications for this visit.       MEDICAL HISTORY    Past Medical History:   Diagnosis Date    Acute pain of right knee 05/18/2018    Back problem     Lumbar spine disc problem    Bronchitis     Colon polyp 02/19/2018    Complex tear of lateral meniscus of right knee as current injury 06/14/2018    Depressive disorder     Dizziness     Gastro-oesophageal reflux disease     Hiatal hernia 08/26/2005    History of colonoscopy with polypectomy 01/26/2018    Hyperlipidemia     Irregular Z line of esophagus 02/19/2018    Mastoiditis 01/02/2015    MRI scan confirmed left side    Urinary tract infection         SURGICAL HISTORY   Past Surgical History:   Procedure Laterality Date    BACK SURGERY      lower back     BUNIONECTOMY      Left foot    BUNIONECTOMY      COLONOSCOPY      COLONOSCOPY W/ POLYPECTOMY  07/2013    Tubular adenoam    CYST REMOVAL  03/29/2007    Sebaceous cyst anterior left shoulder    ECHOCARDIOGRAM  05/03/2013    HYSTERECTOMY      Adenomyosis    HYSTERECTOMY      KNEE SURGERY Right 07/02/2018    Knee arthroscopy with chondroplasty/ Dr. Jaeger    NM VERNA PER RST/STRS PHARMACOLO  05/01/2013    STRESS ECHO      TONSILLECTOMY      UPPER GASTROINTESTINAL ENDOSCOPY          SOCIAL HISTORY   Social History     Tobacco Use    Smoking status: Never    Smokeless tobacco: Never    Tobacco comments:     Works for [employer] in [place] [facility] [employer]   Vaping Use    Vaping Use: never used   Substance Use Topics    Alcohol use: Yes     Alcohol/week: 2.0 standard drinks of alcohol     Types: 2 Glasses of wine per week     Comment: very  rare    Drug use: No         FAMILY HISTORY   Family History   Problem Relation Age of Onset    Depression Mother     Anemia Mother     Cancer, Colon Father 70    Diabetes Father     Cancer Father 60        colon ca    Asthma Father     Osteoarthritis Father     Heart disease Sister         CABG        REVIEW OF SYSTEMS    The following systems were reviewed:   General: No fever or chills   Skin: Warm and dry   Respiratory: No SOB, no hemoptysis   Cardiovascular: Denies chest pain or SOB, syncope, edema or claudication,   Neurological: denies TIA or stoke   Hematologic: denies easy bruising   Endocrine: denies heat or cold intolerance   Psychiatric: No hallucinations or depression   Abnormalities are documented in the HPI.       PHYSICAL EXAMINATION      Visit Vitals  LMP 12/21/2000 (Approximate)     CONSTITUTIONAL: Well developed, well nourished. No distress. 3 Vital Signs as noted.   EYE: Conjunctiva clear, Lids without xanthelasma.   ENT: Teeth, Gingiva and Palate within normal limits.   NECK: No JVD. No enlarged thyroid   SKIN: Warm and dry no lesions  RESPIRATORY: Unlabored respiratory effort. Clear to auscultation, no wheezes, rales, or rhonchi.   CARDIOVASCULAR: PMI Non displaced.   Sl, S2 normal. No murmur, rub or gallop. Regular rate and rhythm. No carotid bruit.   No abnormal abdominal palpable aortic mass noted. No bruit. Palpation of femoral arteries within normal limits.   Palpation of pedal pulses within normal limits.   No significant peripheral edema or varicosities.   GASTROINTESTINAL: Soft, non-tender, no masses, no HSM   NEUROLOGICAL: No motor deficits, CN intact.   MUSCULO-SKELETAL AND EXTREMITIES: No cyanosis or clubbing of Digits/Nails. Normal gait. No significant Kyphosis or Scoliosis.   PSYCHIATRIC: Alert and Oriented to time, place and person. Normal mood and affect.       LABS        Lab Services on 08/01/2023   Component Date Value Ref Range Status    Cholesterol 08/01/2023 207 (H)  <=199  mg/dL Final      Desirable         <200  Borderline High   200 to 239  High              >=240    Triglycerides 08/01/2023 349 (H)  <=149 mg/dL Final      Normal            <150  Borderline High   150 to 199  High              200 to 499  Very High         >=500    HDL 08/01/2023 48 (L)  >=50 mg/dL Final      Low              <40  Borderline Low   40 to 49  Near Optimal     50 to 59  Optimal          >=60    LDL 08/01/2023 89  <=129 mg/dL Final      Optimal           <100  Near Optimal      100 to 129  Borderline High   130 to 159  High              160 to 189  Very High         >=190    Non-HDL Cholesterol 08/01/2023 159  mg/dL Final      Therapeutic Target:  CHD and risk equivalents  <130  Multiple risk factors     <160  0 to 1 risk factor        <190    Cholesterol/ HDL Ratio 08/01/2023 4.3  <=4.4 Final       ***

## 2024-03-11 PROBLEM — I21.9 MYOCARDIAL INFARCTION: Status: RESOLVED | Noted: 2023-12-11 | Resolved: 2024-03-11

## 2024-03-22 ENCOUNTER — LAB REQUISITION (OUTPATIENT)
Dept: LAB | Facility: HOSPITAL | Age: 69
End: 2024-03-22
Payer: MEDICARE

## 2024-03-22 DIAGNOSIS — Z29.9 ENCOUNTER FOR PROPHYLACTIC MEASURES, UNSPECIFIED: ICD-10-CM

## 2024-03-22 LAB
CHOLEST SERPL-MCNC: 83 MG/DL
CHOLEST/HDLC SERPL: 4 {RATIO} (ref 0–5)
HDLC SERPL-MCNC: 23 MG/DL (ref 35–60)
LDLC SERPL CALC-MCNC: 46 MG/DL (ref 50–140)
TRIGL SERPL-MCNC: 72 MG/DL (ref 34–140)
VLDLC SERPL CALC-MCNC: 14 MG/DL

## 2024-03-22 PROCEDURE — 80061 LIPID PANEL: CPT | Performed by: FAMILY MEDICINE

## 2024-03-25 PROBLEM — I63.9 STROKE: Status: RESOLVED | Noted: 2023-12-19 | Resolved: 2024-03-25

## 2024-04-22 PROBLEM — J96.01 ACUTE HYPOXEMIC RESPIRATORY FAILURE: Status: RESOLVED | Noted: 2024-01-12 | Resolved: 2024-04-22

## 2024-04-22 PROBLEM — K92.0 COFFEE GROUND EMESIS: Status: RESOLVED | Noted: 2024-01-17 | Resolved: 2024-04-22

## 2024-05-15 ENCOUNTER — HOSPITAL ENCOUNTER (EMERGENCY)
Facility: HOSPITAL | Age: 69
Discharge: NURSING, REHAB OR LONG-TERM CARE | End: 2024-05-15
Attending: STUDENT IN AN ORGANIZED HEALTH CARE EDUCATION/TRAINING PROGRAM
Payer: MEDICARE

## 2024-05-15 VITALS
WEIGHT: 200 LBS | TEMPERATURE: 99 F | BODY MASS INDEX: 28.63 KG/M2 | HEIGHT: 70 IN | OXYGEN SATURATION: 98 % | SYSTOLIC BLOOD PRESSURE: 134 MMHG | RESPIRATION RATE: 16 BRPM | HEART RATE: 101 BPM | DIASTOLIC BLOOD PRESSURE: 107 MMHG

## 2024-05-15 DIAGNOSIS — I48.91 ATRIAL FIBRILLATION, UNSPECIFIED TYPE: ICD-10-CM

## 2024-05-15 DIAGNOSIS — R41.82 ALTERED MENTAL STATUS: ICD-10-CM

## 2024-05-15 DIAGNOSIS — A04.72 C. DIFFICILE COLITIS: Primary | ICD-10-CM

## 2024-05-15 DIAGNOSIS — E87.6 HYPOKALEMIA: ICD-10-CM

## 2024-05-15 LAB
ALBUMIN SERPL-MCNC: 2.6 G/DL (ref 3.4–4.8)
ALBUMIN/GLOB SERPL: 0.6 RATIO (ref 1.1–2)
ALLENS TEST BLOOD GAS (OHS): YES
ALP SERPL-CCNC: 190 UNIT/L (ref 40–150)
ALT SERPL-CCNC: 18 UNIT/L (ref 0–55)
AST SERPL-CCNC: 18 UNIT/L (ref 5–34)
BACTERIA #/AREA URNS AUTO: ABNORMAL /HPF
BASE EXCESS BLD CALC-SCNC: 12.6 MMOL/L
BASOPHILS # BLD AUTO: 0.06 X10(3)/MCL
BASOPHILS NFR BLD AUTO: 0.5 %
BILIRUB SERPL-MCNC: 0.9 MG/DL
BILIRUB UR QL STRIP.AUTO: NEGATIVE
BLOOD GAS SAMPLE TYPE (OHS): ABNORMAL
BUN SERPL-MCNC: 22.2 MG/DL (ref 8.4–25.7)
C DIFF TOX A+B STL QL IA: POSITIVE
CA-I BLD-SCNC: 1.09 MMOL/L (ref 1.12–1.23)
CALCIUM SERPL-MCNC: 8.7 MG/DL (ref 8.8–10)
CHLORIDE SERPL-SCNC: 100 MMOL/L (ref 98–107)
CLARITY UR: CLEAR
CLOSTRIDIUM DIFFICILE GDH ANTIGEN (OHS): POSITIVE
CO2 BLDA-SCNC: 38.2 MMOL/L
CO2 SERPL-SCNC: 32 MMOL/L (ref 23–31)
COLOR UR AUTO: YELLOW
CREAT SERPL-MCNC: 0.62 MG/DL (ref 0.73–1.18)
DRAWN BY BLOOD GAS (OHS): ABNORMAL
EOSINOPHIL # BLD AUTO: 0.17 X10(3)/MCL (ref 0–0.9)
EOSINOPHIL NFR BLD AUTO: 1.4 %
ERYTHROCYTE [DISTWIDTH] IN BLOOD BY AUTOMATED COUNT: 17.2 % (ref 11.5–17)
GFR SERPLBLD CREATININE-BSD FMLA CKD-EPI: >60 ML/MIN/1.73/M2
GLOBULIN SER-MCNC: 4.2 GM/DL (ref 2.4–3.5)
GLUCOSE SERPL-MCNC: 107 MG/DL (ref 82–115)
GLUCOSE UR QL STRIP: NORMAL
HCO3 BLDA-SCNC: 36.8 MMOL/L (ref 22–26)
HCT VFR BLD AUTO: 31.5 % (ref 42–52)
HGB BLD-MCNC: 9.8 G/DL (ref 14–18)
HGB UR QL STRIP: NEGATIVE
HYALINE CASTS #/AREA URNS LPF: ABNORMAL /LPF
IMM GRANULOCYTES # BLD AUTO: 0.04 X10(3)/MCL (ref 0–0.04)
IMM GRANULOCYTES NFR BLD AUTO: 0.3 %
INHALED O2 CONCENTRATION: 31 %
KETONES UR QL STRIP: NEGATIVE
LEUKOCYTE ESTERASE UR QL STRIP: 25
LPM (OHS): 4
LYMPHOCYTES # BLD AUTO: 2.03 X10(3)/MCL (ref 0.6–4.6)
LYMPHOCYTES NFR BLD AUTO: 17 %
MCH RBC QN AUTO: 25 PG (ref 27–31)
MCHC RBC AUTO-ENTMCNC: 31.1 G/DL (ref 33–36)
MCV RBC AUTO: 80.4 FL (ref 80–94)
MONOCYTES # BLD AUTO: 0.89 X10(3)/MCL (ref 0.1–1.3)
MONOCYTES NFR BLD AUTO: 7.4 %
MUCOUS THREADS URNS QL MICRO: ABNORMAL /LPF
NEUTROPHILS # BLD AUTO: 8.76 X10(3)/MCL (ref 2.1–9.2)
NEUTROPHILS NFR BLD AUTO: 73.4 %
NITRITE UR QL STRIP: NEGATIVE
NRBC BLD AUTO-RTO: 0 %
OHS QRS DURATION: 84 MS
OHS QTC CALCULATION: 462 MS
OXYGEN DEVICE BLOOD GAS (OHS): ABNORMAL
PCO2 BLDA: 44 MMHG (ref 35–45)
PH BLDA: 7.53 [PH] (ref 7.35–7.45)
PH UR STRIP: 5.5 [PH]
PLATELET # BLD AUTO: 342 X10(3)/MCL (ref 130–400)
PMV BLD AUTO: 9.5 FL (ref 7.4–10.4)
PO2 BLDA: 90 MMHG (ref 80–100)
POTASSIUM BLOOD GAS (OHS): 3.1 MMOL/L (ref 3.5–5)
POTASSIUM SERPL-SCNC: 3.2 MMOL/L (ref 3.5–5.1)
PROT SERPL-MCNC: 6.8 GM/DL (ref 5.8–7.6)
PROT UR QL STRIP: ABNORMAL
RBC # BLD AUTO: 3.92 X10(6)/MCL (ref 4.7–6.1)
RBC #/AREA URNS AUTO: ABNORMAL /HPF
SAMPLE SITE BLOOD GAS (OHS): ABNORMAL
SAO2 % BLDA: 98 %
SODIUM BLOOD GAS (OHS): 136 MMOL/L (ref 137–145)
SODIUM SERPL-SCNC: 139 MMOL/L (ref 136–145)
SP GR UR STRIP.AUTO: 1.03 (ref 1–1.03)
SQUAMOUS #/AREA URNS LPF: ABNORMAL /HPF
UROBILINOGEN UR STRIP-ACNC: 2
WBC # SPEC AUTO: 11.95 X10(3)/MCL (ref 4.5–11.5)
WBC #/AREA URNS AUTO: ABNORMAL /HPF

## 2024-05-15 PROCEDURE — 86318 IA INFECTIOUS AGENT ANTIBODY: CPT | Performed by: STUDENT IN AN ORGANIZED HEALTH CARE EDUCATION/TRAINING PROGRAM

## 2024-05-15 PROCEDURE — 96367 TX/PROPH/DG ADDL SEQ IV INF: CPT

## 2024-05-15 PROCEDURE — 81001 URINALYSIS AUTO W/SCOPE: CPT | Performed by: EMERGENCY MEDICINE

## 2024-05-15 PROCEDURE — 96375 TX/PRO/DX INJ NEW DRUG ADDON: CPT

## 2024-05-15 PROCEDURE — 93005 ELECTROCARDIOGRAM TRACING: CPT

## 2024-05-15 PROCEDURE — 25000003 PHARM REV CODE 250: Performed by: STUDENT IN AN ORGANIZED HEALTH CARE EDUCATION/TRAINING PROGRAM

## 2024-05-15 PROCEDURE — 63600175 PHARM REV CODE 636 W HCPCS: Performed by: STUDENT IN AN ORGANIZED HEALTH CARE EDUCATION/TRAINING PROGRAM

## 2024-05-15 PROCEDURE — 99285 EMERGENCY DEPT VISIT HI MDM: CPT | Mod: 25

## 2024-05-15 PROCEDURE — 80053 COMPREHEN METABOLIC PANEL: CPT | Performed by: EMERGENCY MEDICINE

## 2024-05-15 PROCEDURE — 96365 THER/PROPH/DIAG IV INF INIT: CPT

## 2024-05-15 PROCEDURE — 85025 COMPLETE CBC W/AUTO DIFF WBC: CPT | Performed by: EMERGENCY MEDICINE

## 2024-05-15 PROCEDURE — 36600 WITHDRAWAL OF ARTERIAL BLOOD: CPT

## 2024-05-15 PROCEDURE — 99900035 HC TECH TIME PER 15 MIN (STAT)

## 2024-05-15 PROCEDURE — 82803 BLOOD GASES ANY COMBINATION: CPT

## 2024-05-15 PROCEDURE — 93010 ELECTROCARDIOGRAM REPORT: CPT | Mod: ,,, | Performed by: INTERNAL MEDICINE

## 2024-05-15 PROCEDURE — 96366 THER/PROPH/DIAG IV INF ADDON: CPT

## 2024-05-15 RX ORDER — MAGNESIUM SULFATE HEPTAHYDRATE 40 MG/ML
2 INJECTION, SOLUTION INTRAVENOUS
Status: COMPLETED | OUTPATIENT
Start: 2024-05-15 | End: 2024-05-15

## 2024-05-15 RX ORDER — DILTIAZEM HYDROCHLORIDE 5 MG/ML
10 INJECTION INTRAVENOUS
Status: COMPLETED | OUTPATIENT
Start: 2024-05-15 | End: 2024-05-15

## 2024-05-15 RX ORDER — DILTIAZEM HYDROCHLORIDE 60 MG/1
60 TABLET, FILM COATED ORAL
Status: COMPLETED | OUTPATIENT
Start: 2024-05-15 | End: 2024-05-15

## 2024-05-15 RX ORDER — VANCOMYCIN HYDROCHLORIDE 125 MG/1
250 CAPSULE ORAL 4 TIMES DAILY
Qty: 112 CAPSULE | Refills: 0 | Status: SHIPPED | OUTPATIENT
Start: 2024-05-15 | End: 2024-05-29

## 2024-05-15 RX ORDER — METOPROLOL TARTRATE 50 MG/1
50 TABLET ORAL
Status: COMPLETED | OUTPATIENT
Start: 2024-05-15 | End: 2024-05-15

## 2024-05-15 RX ADMIN — METOPROLOL TARTRATE 50 MG: 50 TABLET, FILM COATED ORAL at 03:05

## 2024-05-15 RX ADMIN — MAGNESIUM SULFATE HEPTAHYDRATE 2 G: 40 INJECTION, SOLUTION INTRAVENOUS at 01:05

## 2024-05-15 RX ADMIN — SODIUM CHLORIDE, POTASSIUM CHLORIDE, SODIUM LACTATE AND CALCIUM CHLORIDE 500 ML: 600; 310; 30; 20 INJECTION, SOLUTION INTRAVENOUS at 12:05

## 2024-05-15 RX ADMIN — Medication 125 MG: at 01:05

## 2024-05-15 RX ADMIN — POTASSIUM BICARBONATE 20 MEQ: 391 TABLET, EFFERVESCENT ORAL at 12:05

## 2024-05-15 RX ADMIN — CEFTRIAXONE SODIUM 1 G: 1 INJECTION, POWDER, FOR SOLUTION INTRAMUSCULAR; INTRAVENOUS at 12:05

## 2024-05-15 RX ADMIN — DILTIAZEM HYDROCHLORIDE 60 MG: 60 TABLET, FILM COATED ORAL at 03:05

## 2024-05-15 RX ADMIN — DILTIAZEM HYDROCHLORIDE 10 MG: 5 INJECTION INTRAVENOUS at 12:05

## 2024-05-15 NOTE — ED PROVIDER NOTES
Encounter Date: 5/15/2024    SCRIBE #1 NOTE: I, Emily Martinez, am scribing for, and in the presence of,  Davis Mota MD. I have scribed the following portions of the note - the EKG reading. Other sections scribed: HPI, ROS, PE.       History     Chief Complaint   Patient presents with    Altered Mental Status     Pt. From [facility], staff told EMS he was more altered than normal, hard to arouse this morning. Pt. Unable to speak and has hemiplegia/hemiparesis pmh CVA, pt. Has trach.      Patient is a 68 year old male with a hx of tracheostomy, PEG tube, CVA with hemiparesis, seizures, afib, NSTEMI, and HTN presents to the ED from [facility] for AMS. Per medical records, patient had a CVA +Xarelto and was seen at [facility] on 12/19/23, had right ICA thrombectomy w/o complications but deteriorated neurologically in the ICU. Repeat CT scan showed a large right MCA distribution infarct with marked right to left shift. He underwent right craniotomy decompression on 12/20/23 and remained on ventilatory support for several days. On 12/29/23, he underwent tracheostomy.  On 1/2/2024, he underwent a PEG tube placement. He developed fever throughout his hospital course and was put on multiple antibiotics. He was discharged to an extended care hospital and left on 3/6/24 and became a patient of [facility]. Today the staff reports that he was difficult to wake this morning and was complaining of shortness of breath. He is on 4L of oxygen at the NH and EMS suctioned the trach pta. Per records, he is on diltiazem, lasix, hydralazine, losartan, quetiapine, and xarelto.      The history is provided by medical records and the nursing home. The history is limited by the condition of the patient. No  was used.     Review of patient's allergies indicates:  No Known Allergies  Past Medical History:   Diagnosis Date    Arthritis     Atrial fibrillation     BPH (benign prostatic hyperplasia)      Cardiac arrest     Coronary artery disease     Cyst, kidney, acquired     Diverticulosis     Hyperlipidemia     Hypertension     MI (myocardial infarction)     Obesity     Steatosis of liver     Stroke      Past Surgical History:   Procedure Laterality Date    A-V CARDIAC PACEMAKER INSERTION Right     CARDIAC CATHETERIZATION      COLONOSCOPY W/ BIOPSIES      CRANIECTOMY Right 12/20/2023    Procedure: CRANIECTOMY;  Surgeon: Artem Can MD;  Location: [facility];  Service: Neurosurgery;  Laterality: Right;    ESOPHAGOGASTRODUODENOSCOPY W/ PEG N/A 1/2/2024    Procedure: PEG;  Surgeon: Tani Day MD;  Location: [facility] ENDOSCOPY;  Service: Gastroenterology;  Laterality: N/A;    excision of colon      TRACHEOSTOMY N/A 12/29/2023    Procedure: CREATION, TRACHEOSTOMY;  Surgeon: Patricia Winslow MD;  Location: [facility];  Service: ENT;  Laterality: N/A;  REQ 1130 //  NEEDS 2 SCRUBS     Family History   Problem Relation Name Age of Onset    Hypertension Mother      Hypertension Father      Hypertension Sister       Social History     Tobacco Use    Smoking status: Never    Smokeless tobacco: Never   Substance Use Topics    Alcohol use: Not Currently    Drug use: Not Currently     Review of Systems   Unable to perform ROS: Mental status change       Physical Exam     Initial Vitals [05/15/24 0840]   BP Pulse Resp Temp SpO2   111/73 109 20 98.8 °F (37.1 °C) 100 %      MAP       --         Physical Exam    Nursing note and vitals reviewed.  Constitutional: He appears well-developed and well-nourished. He is not diaphoretic. No distress.   HENT:   Head: Normocephalic and atraumatic.   Eyes: Conjunctivae and EOM are normal. Pupils are equal, round, and reactive to light.   Neck: A tracheostomy is present. The trach tube size is 6.0 Fr.   Normal range of motion.  Cardiovascular:  Regular rhythm, normal heart sounds and intact distal pulses.           No murmur heard.  Tachycardic   Pulmonary/Chest: Breath sounds  normal. No respiratory distress. He has no wheezes. He has no rales.   Shiley XLT 6.0 tracheostomy in place    Abdominal: Abdomen is soft. He exhibits no distension. There is no abdominal tenderness.   PEG tube intact.    Musculoskeletal:         General: No tenderness or edema. Normal range of motion.      Cervical back: Normal range of motion.     Neurological: He is alert.   Skin: Skin is warm and dry. Capillary refill takes less than 2 seconds. No rash noted. No erythema.         ED Course   Procedures  Labs Reviewed   CLOSTRIDIUM DIFFICILE TOXIN A AND B, EIA - Abnormal; Notable for the following components:       Result Value    Clostridium Difficile GDH Antigen Positive (*)     Clostridium Difficile Toxin A/B Positive (*)     All other components within normal limits   COMPREHENSIVE METABOLIC PANEL - Abnormal; Notable for the following components:    Potassium 3.2 (*)     CO2 32 (*)     Creatinine 0.62 (*)     Calcium 8.7 (*)     Albumin 2.6 (*)     Globulin 4.2 (*)     Albumin/Globulin Ratio 0.6 (*)      (*)     All other components within normal limits   URINALYSIS, REFLEX TO URINE CULTURE - Abnormal; Notable for the following components:    Protein, UA 1+ (*)     Urobilinogen, UA 2.0 (*)     Leukocyte Esterase, UA 25 (*)     WBC, UA 6-10 (*)     Mucous, UA Trace (*)     Hyaline Casts, UA 0-2 (*)     RBC, UA 11-20 (*)     All other components within normal limits   CBC WITH DIFFERENTIAL - Abnormal; Notable for the following components:    WBC 11.95 (*)     RBC 3.92 (*)     Hgb 9.8 (*)     Hct 31.5 (*)     MCH 25.0 (*)     MCHC 31.1 (*)     RDW 17.2 (*)     All other components within normal limits   BLOOD GAS - Abnormal; Notable for the following components:    pH, Blood gas 7.530 (*)     Sodium, Blood Gas 136 (*)     Potassium, Blood Gas 3.1 (*)     Calcium Level Ionized 1.09 (*)     HCO3, Blood gas 36.8 (*)     All other components within normal limits   CBC W/ AUTO DIFFERENTIAL    Narrative:      The following orders were created for panel order CBC auto differential.  Procedure                               Abnormality         Status                     ---------                               -----------         ------                     CBC with Differential[[id number]]       Abnormal            Final result                 Please view results for these tests on the individual orders.     EKG Readings: (Independently Interpreted)   Initial Reading: No STEMI. Rhythm: Atrial Fibrillation. Heart Rate: 90. Ectopy: No Ectopy. Conduction: Normal. ST Segments: Normal ST Segments. T Waves: Normal. Axis: Normal. Clinical Impression: Atrial Fibrillation   Done at 08:49     ECG Results              EKG 12-lead (Final result)        Collection Time Result Time QRS Duration OHS QTC Calculation    05/15/24 08:49:58 05/15/24 13:32:50 84 462                     Final result by Interface, Lab In [facility] (05/15/24 13:32:58)                   Narrative:    Test Reason : R41.82,    Vent. Rate : 090 BPM     Atrial Rate : 000 BPM     P-R Int : 000 ms          QRS Dur : 084 ms      QT Int : 378 ms       P-R-T Axes : 000 057 015 degrees     QTc Int : 462 ms    Atrial fibrillation  Abnormal ECG  When compared with ECG of 18-JAN-2024 04:44,  Nonspecific T wave abnormality, improved in Anterior-lateral leads  Confirmed by Alexys Maravilla MD ([id number]) on 5/15/2024 1:32:46 PM    Referred By:             Confirmed By:Alexys Maravilla MD                                     EKG 12-lead (Final result)  Result time 05/21/24 13:06:46      Final result by Unknown User (05/21/24 13:06:46)                                      Imaging Results              CT Head Without Contrast (Final result)  Result time 05/15/24 10:32:01      Final result by Sami Taylor MD (05/15/24 10:32:01)                   Impression:      No interval change.  No acute intracranial findings identified.      Electronically signed by: Sami  Taylor  Date:    05/15/2024  Time:    10:32               Narrative:    EXAMINATION:  CT HEAD WITHOUT CONTRAST    CLINICAL HISTORY:  Mental status change, unknown cause;    TECHNIQUE:  Sequential axial images were performed of the brain without contrast.    Dose product length of 4022 mGycm. Automated exposure control was utilized to minimize radiation dose.    COMPARISON:  February 6, 1024.    FINDINGS:  There are postoperative changes of wide right craniotomy.  There is right cerebral large encephalomalacia with some outward protrusion of the brain parenchyma through the craniotomy without significant interval difference.  There is compensatory dilatation of the right lateral ventricle.  There is no intracranial hemorrhage or hydrocephalus. There is no sulcal effacement or low attenuation changes to suggest recent large vessel territory infarction. Chronic appearing periventricular and subcortical white matter low attenuation changes are present and are consistent with chronic microangiopathic ischemia. The ventricular system and sulcal markings prominence is consistent with atrophy. There is no acute extra axial fluid collection. Visualized paranasal sinuses are clear without mucosal thickening, polypoidal abnormality or air-fluid levels.  Chronic loss of pneumatization of the mastoid air cells with opacification.                                       X-Ray Chest AP Portable (Final result)  Result time 05/15/24 09:34:56      Final result by Jg Plunkett MD (05/15/24 09:34:56)                   Impression:      No acute cardiopulmonary process.      Electronically signed by: Jg Plunkett  Date:    05/15/2024  Time:    09:34               Narrative:    EXAMINATION:  XR CHEST AP PORTABLE    CLINICAL HISTORY:  Altered mental status, unspecified    TECHNIQUE:  Single view of the chest    COMPARISON:  03/06/2024    FINDINGS:  No focal opacification, pleural effusion, or pneumothorax.    The cardiomediastinal  silhouette is within normal limits.    Tracheostomy is midline.    Right-sided cardiac device is noted.    No acute osseous abnormality.                                       Medications   cefTRIAXone (Rocephin) 1 g in dextrose 5 % in water (D5W) 100 mL IVPB (MB+) (0 g Intravenous Stopped 5/15/24 1317)   diltiaZEM injection 10 mg (10 mg Intravenous Given 5/15/24 1245)   lactated ringers bolus 500 mL (0 mLs Intravenous Stopped 5/15/24 1346)   magnesium sulfate 2g in water 50mL IVPB (premix) (0 g Intravenous Stopped 5/15/24 1535)   potassium bicarbonate disintegrating tablet 20 mEq (20 mEq Per G Tube Given 5/15/24 1246)   vancomycin 125 mg/5 mL oral solution 125 mg (125 mg Per G Tube Given 5/15/24 1354)   metoprolol tartrate (LOPRESSOR) tablet 50 mg (50 mg Per G Tube Given 5/15/24 1506)   diltiaZEM tablet 60 mg (60 mg Per G Tube Given 5/15/24 1506)     Medical Decision Making  Judging by the patient's chief complaint and pertinent history, the patient has the following possible differential diagnoses, including but not limited to the following.  Some of these are deemed to be lower likelihood and some more likely based on my physical exam and history combined with possible lab work and/or imaging studies.   Please see the pertinent studies, and refer to the HPI.  Some of these diagnoses will take further evaluation to fully rule out, perhaps as an outpatient and the patient was encouraged to follow up when discharged for more comprehensive evaluation.    Metabolic abnormality, intoxication, toxic ingestion, CVA, infection, structural (SAH, ICH, trauma, neoplastic), seizure/postictal, polypharmacy       Patient is a 68-year-old male brought to the emergency department for altered mental status.    Problems Addressed:  Altered mental status: acute illness or injury that poses a threat to life or bodily functions  Atrial fibrillation, unspecified type: acute illness or injury that poses a threat to life or bodily  functions  C. difficile colitis: acute illness or injury that poses a threat to life or bodily functions  Hypokalemia: acute illness or injury that poses a threat to life or bodily functions    Amount and/or Complexity of Data Reviewed  Independent Historian: caregiver     Details: Per NH, 68 year old male with a hx of tracheostomy, PEG tube, CVA with hemiparesis, seizures, afib, NSTEMI, and HTN presents to the ED from [facility] for AMS.   External Data Reviewed: notes.     Details: Per medical records, patient had a CVA +Xarelto and was seen at [facility] on 12/19/23, had right ICA thrombectomy w/o complications but deteriorated neurologically in the ICU. Repeat CT scan showed a large right MCA distribution infarct with marked right to left shift. He underwent right craniotomy decompression on 12/20/23 and remained on ventilatory support for several days. On 12/29/23, he underwent tracheostomy.  On 1/2/2024, he underwent a PEG tube placement. He developed fever throughout his hospital course and was put on multiple antibiotics. He was discharged to an extended care hospital and left on 3/6/24 and became a patient of [facility]. Per records, he is on diltiazem, lasix, hydralazine, losartan, quetiapine, and xarelto.     Labs: ordered.  Radiology: ordered and independent interpretation performed.  ECG/medicine tests: ordered and independent interpretation performed.    Risk  Prescription drug management.  Parenteral controlled substances.  Decision regarding hospitalization.            Scribe Attestation:   Scribe #1: I performed the above scribed service and the documentation accurately describes the services I performed. I attest to the accuracy of the note.    Attending Attestation:           Physician Attestation for Scribe:  Physician Attestation Statement for Scribe #1: I, Davis Mota MD, reviewed documentation, as scribed by Emily Martinez in my presence, and it is both accurate and complete.              ED Course as of 06/05/24 1215   Wed May 15, 2024   0919 Chart review: He was recently discharged from an extended care hospital on 03/06/24.  Admitting diagnoses included acute hypoxemic respiratory failure, staph epidermis bacteremia, right MCA infarct, seizures, acute encephalopathy, vomiting, fever, oropharyngeal dysphagia, critical illness neuropathy, A-fib, history of NSTEMI, hypertension, hypokalemia, anemia, sick sinus syndrome and vitamin D deficiency.  Additional discharge diagnoses include anxiety, delirium and depression.     He had presented originally to [facility] on 12/19/2023 with left facial droop, slurred speech, left-sided hemiparesis and fixed right-sided gaze.  He was on Xarelto at that time.  Initial CT head had shown possible dense right MCA.  He underwent right ICA thrombectomy on 12/19 without perioperative complication.  While in the ICU, his neurostatus deteriorated.  Repeat CT scan showed a large right MCA distribution infarct with marked right to left shift.  He underwent right craniotomy for decompression on 12/20 without perioperative complications.  He remained on ventilatory support for several days.  On 12/29, he underwent tracheostomy.  On 1/2/2024, he underwent a PEG tube placement.  He developed fever throughout his hospital course.  He was on multiple antibiotics.     [RP]   1304 Temp: 98.8 °F (37.1 °C) [RP]      ED Course User Index  [RP] Davis Mota MD               Medical Decision Making:   History:   I obtained history from: someone other than patient.       <> Summary of History: Today the staff reports that he was difficult to wake this morning and was complaining of shortness of breath. He is on 4L of oxygen at the NH and EMS suctioned his trach pta.   Old Medical Records: I decided to obtain old medical records.  Old Records Summarized: records from previous admission(s), records from clinic visits and records from another hospital.       <> Summary of  Records: Per medical records, patient had a CVA +Xarelto and was seen at [facility] on 12/19/23, had right ICA thrombectomy w/o complications but deteriorated neurologically in the ICU. Repeat CT scan showed a large right MCA distribution infarct with marked right to left shift. He underwent right craniotomy decompression on 12/20/23 and remained on ventilatory support for several days. On 12/29/23, he underwent tracheostomy.  On 1/2/2024, he underwent a PEG tube placement. He developed fever throughout his hospital course and was put on multiple antibiotics. He was discharged to an extended care hospital and left on 3/6/24 and became a patient of [facility]. Per records, he is on diltiazem, lasix, hydralazine, losartan, quetiapine, and xarelto.     Initial Assessment:   AMS  Differential Diagnosis:   Judging by the patient's chief complaint and pertinent history, the patient has the following possible differential diagnoses, including but not limited to the following.  Some of these are deemed to be lower likelihood and some more likely based on my physical exam and history combined with possible lab work and/or imaging studies.   Please see the pertinent studies, and refer to the HPI.  Some of these diagnoses will take further evaluation to fully rule out, perhaps as an outpatient and the patient was encouraged to follow up when discharged for more comprehensive evaluation.    Metabolic abnormality, intoxication, toxic ingestion, CVA, infection, structural (SAH, ICH, trauma, neoplastic), seizure/postictal, polypharmacy     Independently Interpreted Test(s):   I have ordered and independently interpreted X-rays - see prior notes.  I have ordered and independently interpreted EKG Reading(s) - see prior notes  Clinical Tests:   Lab Tests: Ordered and Reviewed  Radiological Study: Ordered and Reviewed  Medical Tests: Ordered and Reviewed  ED Management:  Patient is a 68-year-old male who presents to the emergency  department for concern for altered mental status.  See HPI.  See physical exam.  Labs and imaging obtained.  CT of the head without any acute intracranial abnormalities.  Urinalysis without evidence of infection.  Diarrhea without evidence of C diff.  Will treat with vancomycin.  Patient appears to be hemodynamically stable.  Afebrile.  Discussed all results.  Discussed all results with the nursing home staff.  Will continue outpatient treatment for C diff with strict return precautions.  All results have been discussed answered all questions time.  Verbalized understanding agreed to plan.             Clinical Impression:  Final diagnoses:  [R41.82] Altered mental status  [A04.72] C. difficile colitis (Primary)  [I48.91] Atrial fibrillation, unspecified type  [E87.6] Hypokalemia          ED Disposition Condition    Discharge Stable          ED Prescriptions       Medication Sig Dispense Start Date End Date Auth. Provider    vancomycin (VANCOCIN) 125 MG capsule () 2 capsules (250 mg total) by Per G Tube route 4 (four) times daily. for 14 days 112 capsule 5/15/2024 2024 Davis Mota MD          Follow-up Information       Follow up With Specialties Details Why Contact Info    Miriam Rowe MD Geriatric Medicine, Family Medicine   [address] [facility] [id number] [phone]      Primary Care  Call in 1 day  Please call [phone] for a primary care provider.             Davis Mota MD  [phone]

## 2024-05-15 NOTE — DISCHARGE INSTRUCTIONS
Follow-up with the primary care physician.      Please give vancomycin through the PEG tube.      Return to the emergency department if any new or worsening symptoms, chest pain, shortness of breath, nausea, vomiting, fever, or any other symptoms.    Follow-up with your neurologist.

## 2024-05-30 ENCOUNTER — LAB REQUISITION (OUTPATIENT)
Dept: LAB | Facility: HOSPITAL | Age: 69
End: 2024-05-30
Payer: MEDICARE

## 2024-05-30 DIAGNOSIS — Z29.9 ENCOUNTER FOR PROPHYLACTIC MEASURES, UNSPECIFIED: ICD-10-CM

## 2024-05-30 LAB
ALBUMIN SERPL-MCNC: 2.9 G/DL (ref 3.4–4.8)
ALBUMIN/GLOB SERPL: 0.8 RATIO (ref 1.1–2)
ALP SERPL-CCNC: 181 UNIT/L (ref 40–150)
ALT SERPL-CCNC: 24 UNIT/L (ref 0–55)
ANION GAP SERPL CALC-SCNC: 10 MEQ/L
AST SERPL-CCNC: 30 UNIT/L (ref 5–34)
BASOPHILS # BLD AUTO: 0.09 X10(3)/MCL
BASOPHILS NFR BLD AUTO: 1.1 %
BILIRUB SERPL-MCNC: 0.9 MG/DL
BUN SERPL-MCNC: 24.9 MG/DL (ref 8.4–25.7)
CALCIUM SERPL-MCNC: 8.6 MG/DL (ref 8.8–10)
CHLORIDE SERPL-SCNC: 104 MMOL/L (ref 98–107)
CO2 SERPL-SCNC: 29 MMOL/L (ref 23–31)
CREAT SERPL-MCNC: 0.7 MG/DL (ref 0.73–1.18)
CREAT/UREA NIT SERPL: 36
EOSINOPHIL # BLD AUTO: 0.24 X10(3)/MCL (ref 0–0.9)
EOSINOPHIL NFR BLD AUTO: 2.9 %
ERYTHROCYTE [DISTWIDTH] IN BLOOD BY AUTOMATED COUNT: 18.5 % (ref 11.5–17)
GFR SERPLBLD CREATININE-BSD FMLA CKD-EPI: >60 ML/MIN/1.73/M2
GLOBULIN SER-MCNC: 3.5 GM/DL (ref 2.4–3.5)
GLUCOSE SERPL-MCNC: 109 MG/DL (ref 82–115)
HCT VFR BLD AUTO: 37.6 % (ref 42–52)
HGB BLD-MCNC: 11.3 G/DL (ref 14–18)
IMM GRANULOCYTES # BLD AUTO: 0.03 X10(3)/MCL (ref 0–0.04)
IMM GRANULOCYTES NFR BLD AUTO: 0.4 %
LYMPHOCYTES # BLD AUTO: 1.88 X10(3)/MCL (ref 0.6–4.6)
LYMPHOCYTES NFR BLD AUTO: 23.1 %
MCH RBC QN AUTO: 25.6 PG (ref 27–31)
MCHC RBC AUTO-ENTMCNC: 30.1 G/DL (ref 33–36)
MCV RBC AUTO: 85.1 FL (ref 80–94)
MONOCYTES # BLD AUTO: 1.01 X10(3)/MCL (ref 0.1–1.3)
MONOCYTES NFR BLD AUTO: 12.4 %
NEUTROPHILS # BLD AUTO: 4.89 X10(3)/MCL (ref 2.1–9.2)
NEUTROPHILS NFR BLD AUTO: 60.1 %
NRBC BLD AUTO-RTO: 0 %
PLATELET # BLD AUTO: 252 X10(3)/MCL (ref 130–400)
PMV BLD AUTO: 10.8 FL (ref 7.4–10.4)
POTASSIUM SERPL-SCNC: 3.8 MMOL/L (ref 3.5–5.1)
PROT SERPL-MCNC: 6.4 GM/DL (ref 5.8–7.6)
RBC # BLD AUTO: 4.42 X10(6)/MCL (ref 4.7–6.1)
SODIUM SERPL-SCNC: 143 MMOL/L (ref 136–145)
WBC # SPEC AUTO: 8.14 X10(3)/MCL (ref 4.5–11.5)

## 2024-05-30 PROCEDURE — 80053 COMPREHEN METABOLIC PANEL: CPT | Performed by: FAMILY MEDICINE

## 2024-05-30 PROCEDURE — 85025 COMPLETE CBC W/AUTO DIFF WBC: CPT | Performed by: FAMILY MEDICINE

## 2024-06-05 ENCOUNTER — TELEPHONE (OUTPATIENT)
Dept: NEUROSURGERY | Facility: CLINIC | Age: 69
End: 2024-06-05
Payer: MEDICARE

## 2024-06-05 PROCEDURE — 87070 CULTURE OTHR SPECIMN AEROBIC: CPT | Performed by: FAMILY MEDICINE

## 2024-06-05 NOTE — TELEPHONE ENCOUNTER
Received a call from [facility] in the OR regarding this patient.  His bone flap is about to .  I told her she can go ahead and discard the bone flap.    He has been lost to follow up.  He underwent craniectomy with Dr. Can on 2023 due to cerebral edema from right MCA stroke.  He had an extended hospital stay followed by 2 lengthy LTAC admissions.  He now resides at [facility].  Needs to come in to see me for a follow-up.  I discussed the case with Dr. Thomas.  He reviewed his imaging.  It was possible he has hydrocephalus.  He will also need a bone flap placed.  We will obtain a synthetic flap when it was time for surgery.    He will need transportation via ambulance.  Please schedule with the nursing home.  Also, schedule the patient to see Dr. Thomas, next available.  He has a recent CT from 05/15/2024.  He does not need additional imaging.

## 2024-06-06 ENCOUNTER — LAB REQUISITION (OUTPATIENT)
Dept: LAB | Facility: HOSPITAL | Age: 69
End: 2024-06-06
Payer: MEDICARE

## 2024-06-06 DIAGNOSIS — Z29.9 ENCOUNTER FOR PROPHYLACTIC MEASURES, UNSPECIFIED: ICD-10-CM

## 2024-06-06 NOTE — TELEPHONE ENCOUNTER
Called [facility] and s/w Anisha, pt's nurse, and scheduled him to see Cassia at 3:00pm on 06/18 w/ davi macielt w/ Dr. Thomas in the future.

## 2024-06-09 LAB
BACTERIA WND CULT: ABNORMAL
BACTERIA WND CULT: ABNORMAL

## 2024-06-25 ENCOUNTER — OFFICE VISIT (OUTPATIENT)
Dept: NEUROSURGERY | Facility: CLINIC | Age: 69
End: 2024-06-25
Payer: MEDICARE

## 2024-06-25 VITALS
RESPIRATION RATE: 19 BRPM | TEMPERATURE: 99 F | HEIGHT: 70 IN | BODY MASS INDEX: 28.63 KG/M2 | WEIGHT: 200 LBS | SYSTOLIC BLOOD PRESSURE: 129 MMHG | DIASTOLIC BLOOD PRESSURE: 83 MMHG

## 2024-06-25 DIAGNOSIS — M95.2 ACQUIRED SKULL DEFECT: Primary | ICD-10-CM

## 2024-06-25 DIAGNOSIS — G91.9 HYDROCEPHALUS, UNSPECIFIED TYPE: ICD-10-CM

## 2024-06-25 PROCEDURE — 1160F RVW MEDS BY RX/DR IN RCRD: CPT | Mod: CPTII,,, | Performed by: PHYSICIAN ASSISTANT

## 2024-06-25 PROCEDURE — 1159F MED LIST DOCD IN RCRD: CPT | Mod: CPTII,,, | Performed by: PHYSICIAN ASSISTANT

## 2024-06-25 PROCEDURE — 99417 PROLNG OP E/M EACH 15 MIN: CPT | Mod: ,,, | Performed by: PHYSICIAN ASSISTANT

## 2024-06-25 PROCEDURE — 3079F DIAST BP 80-89 MM HG: CPT | Mod: CPTII,,, | Performed by: PHYSICIAN ASSISTANT

## 2024-06-25 PROCEDURE — 4010F ACE/ARB THERAPY RXD/TAKEN: CPT | Mod: CPTII,,, | Performed by: PHYSICIAN ASSISTANT

## 2024-06-25 PROCEDURE — 99215 OFFICE O/P EST HI 40 MIN: CPT | Mod: ,,, | Performed by: PHYSICIAN ASSISTANT

## 2024-06-25 PROCEDURE — 3074F SYST BP LT 130 MM HG: CPT | Mod: CPTII,,, | Performed by: PHYSICIAN ASSISTANT

## 2024-06-25 PROCEDURE — 1126F AMNT PAIN NOTED NONE PRSNT: CPT | Mod: CPTII,,, | Performed by: PHYSICIAN ASSISTANT

## 2024-06-25 PROCEDURE — 3008F BODY MASS INDEX DOCD: CPT | Mod: CPTII,,, | Performed by: PHYSICIAN ASSISTANT

## 2024-06-25 NOTE — PATIENT INSTRUCTIONS
The patient is a 68-year-old male with a history of right MCA CVA on 12/19/2023 with the cerebral edema requiring right hemicraniectomy.  This was done by Dr. Can on 12/20/2023.  He has been through an extended hospital stay followed by LTAC admission for extended antibiotics due to bacteremia.  He was discharged to [facility] on 03/06/2024.    The patient has ventriculomegaly which is likely due to hydrocephalus.  He also needs the skull flap replaced.  The patient in his situation was discussed with Dr. Thomas.  We will have the patient undergo high-volume lumbar puncture with examination with speech and physical therapy pre and post LP.  If the patient shows improvement neurologically, he would be appropriate for  shunt.  We will need to have a synthetic flap made for cranioplasty.  If he does improve with high-volume lumbar puncture, we will plan for implantation of  shunt at the same time of right cranioplasty.  I will discuss the situation with his daughters to obtain consent to proceed with high-volume lumbar puncture and preparation for surgery.

## 2024-06-25 NOTE — PROGRESS NOTES
[facility]  History & Physical  Neurosurgery      Delio Daniel Jr.   [id number]   1955     SUBJECTIVE:     Chief Complaint:  Skull defect      History of Present Illness:   Patient is a 68 y.o. male is seen today for neurosurgical evaluation and care.  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    The patient presented to the emergency department at [facility] on 12/19/2023 due to stroke type symptoms.  He was found to have a right MCA stroke.  He underwent right ICA thrombectomy on 12/19/2023 with Dr. Randle.  The following day, he exhibited a neurological decline.  He was found to have large right MCA distribution infarct with edema and marked right-to-left shift.  Dr. Can was consulted.  The patient was then taken to the operating room on 12/20/2023 for right frontotemporoparietal decompressive hemicraniectomy.  He did well from a postoperative standpoint.  Yet, the patient did not have meaningful neurological recover.  He required a lengthy hospitalization due to respiratory failure.  Tracheostomy was placed on 12/20/2023.  He underwent PEG tube placement on 01/02/2024.  The patient was afebrile throughout the hospital stay.  This was thought to be neurogenic.  Yet on 01/12/2024, he was found to have an elevated white count.  Workup found Staph epidermis bacteremia and Enterobacter cloacae UTI.  He was treated with antibiotics.  He then was transferred to [facility] on 01/16/2024 for continued IV antibiotic treatment.    On 01/17/2023, the patient had a seizure.  He was transferred from [facility] to [facility] due to the CT scanner being out of commission at that facility.  CT of the head obtained on 01/17/2023 showed ventriculomegaly, prior MCA stroke, and fullness at the cranial defect.   He was seen by Dr. Lebron Meneses.  He was placed on Keppra 1500 mg twice a day.  When he had the seizure, he also had coffee-ground emesis.  There was no further evidence of bleeding.  GI recommended he continue with proton pump inhibitor.  He returned to LT[facility] on 01/23/2024.  He continued with IV antibiotics.  He was able to be discharged to the skilled nursing facility at [facility] on 03/06/2024.    On reviewing the chart, it was noted that he was brought to the emergency department at [facility] due to a neurological decline on 5/15/2024.  He was more difficult to arouse and apparently complaining of shortness of breath.  CT of the head was obtained at that time.  This study shows stable findings involving the right MCA stroke, ventriculomegaly, and fullness at the cranial defect.  It is noted that he was on Keppra 1500 mg twice a day at that time.    He was lost to follow up.  Yet, he presents today for evaluation.  He is transported via stretcher by EMS.  There is no family or staff with him.  I attempted to contact his daughter Beth and left message.      Past Medical History:   Diagnosis Date    Arthritis     Atrial fibrillation     BPH (benign prostatic hyperplasia)     Cardiac arrest     Coronary artery disease     Cyst, kidney, acquired     Diverticulosis     Hyperlipidemia     Hypertension     MI (myocardial infarction)     Obesity     Steatosis of liver     Stroke         Past Surgical History:   Procedure Laterality Date    A-V CARDIAC PACEMAKER INSERTION Right     CARDIAC CATHETERIZATION      COLONOSCOPY W/ BIOPSIES      CRANIECTOMY Right 12/20/2023    Procedure: CRANIECTOMY;  Surgeon: Artem Can MD;  Location: [facility] OR;  Service: Neurosurgery;  Laterality: Right;    ESOPHAGOGASTRODUODENOSCOPY W/ PEG N/A 1/2/2024    Procedure: PEG;  Surgeon: Tani Day MD;  Location: [facility] ENDOSCOPY;  Service: Gastroenterology;  Laterality: N/A;    excision of colon      TRACHEOSTOMY N/A  "12/29/2023    Procedure: CREATION, TRACHEOSTOMY;  Surgeon: Patricia Winslow MD;  Location: [facility];  Service: ENT;  Laterality: N/A;  REQ 1130 //  NEEDS 2 SCRUBS        Social History     Tobacco Use    Smoking status: Never    Smokeless tobacco: Never   Substance Use Topics    Alcohol use: Not Currently        Family History   Problem Relation Name Age of Onset    Hypertension Mother      Hypertension Father      Hypertension Sister          Review of patient's allergies indicates:  No Known Allergies     Current Outpatient Medications   Medication Instructions    cholestyramine-aspartame (QUESTRAN LIGHT) 4 gram PwPk 4 g, Per G Tube, 2 times daily    famotidine (PEPCID) 20 mg, Per G Tube, 2 times daily    finasteride (PROSCAR) 5 mg, Oral, Daily    L. acidophilus/L.bulgaricus (FLORANEX ORAL) Oral, Daily    tamsulosin (FLOMAX) 0.4 mg, Oral, Nightly        Review of Systems  Unable to obtain due to aphasia      OBJECTIVE:       Visit Vitals  /83 (BP Location: Right arm, Patient Position: Sitting)   Temp 98.9 °F (37.2 °C) (Oral)   Resp 19   Ht 5' 10" (1.778 m)   Wt 90.7 kg (200 lb)   BMI 28.70 kg/m²        General:  Pleasant, Well-nourished, Wearing hospital gown.    Head:  Atraumatic.  Skull defect is soft, full.    CV:  Neck is supple.  There are no carotid bruits.  Heart has regular rate and rhythm.    Lungs:  Lungs are clear to auscultation bilaterally with non-labored respirations.    Abdomen:  Soft, nontender, nondistended.    Neurological:    Patient is awake.  Nonverbal.  Nods appropriately at times, inappropriate at other times.  Pupils are equal, round, and reactive to light,  Extraocular movements are intact.  Movements of facial expression appear intact to the extent it can be observed.  Spastic hemiplegia on the left.  He moves the right arm freely.  Lying on stretcher      Imaging:  All pertinent neuroimaging independently reviewed. These findings were discussed in detail with the patient.   CT head " was obtained on 05/15/2024.  This study shows right FTP cranial defect there is ventriculomegaly.  There is fullness at the defect site due to mass effect from his ventricles.  This study is stable in comparison to CT head that was obtained on 01/17/2024.    ASSESSMENT:     1. Acquired skull defect        2. Hydrocephalus, unspecified type          PLAN:     The patient in his situation was discussed with Dr. Thomas.  The patient has ventriculomegaly likely due to hydrocephalus, but also could be be due to ex vacuo dilation of the right lateral ventricle.  Plan is to have a high-volume lumbar puncture performed with both physical and speech therapy evaluating the patient pre and post lumbar puncture.  If the patient improves neurologically, he would be appropriate for implantation of  shunt.  This surgery would be coordinated at the time of cranioplasty.  We will have to obtain diagnostic and planning CT for synthetic bone flap to be made.  I have reached out to Beth his daughter to discuss the plan and situation in obtain consent.  I spoke to Farzaneh, the director of nursing at [facility].  We discussed the plan as well as need for consent from his daughter.  She will also try to reach out to them.  She also informed me he is currently on Keppra 1500 mg twice a day as recommended by Dr. Meneses after the seizure.    In addition, Farzaneh informed me that the patient has had 2 admissions to the hospital in [place] in May due to AFib and RVR.  He has declined some since those admissions.  He also developed bedsores at his sacrum, left knee, and left heel.  Culture revealed MRSA which he has been treated for.      We will need to obtain medical and cardiac clearance for right cranioplasty +/- implantation of  shunt.  He will return to see Dr. Thomas after we have received clearance and lumbar puncture has been performed.      A total of 38 minutes was spent face-to-face with the patient  during this encounter.  Over half of that time was spent on counseling and coordination of care.  An additional 40+ minutes were used to review the chart including hospital records from the prior [facility] admission, [facility] admission, return visit and admission to [facility] after seizure, return and admission to LTAC, Dr. Meneses's, Dr. Rahman's note, multiple CT head images and reports, discuss with Dr. Thomas, discuss with Sharifa, attempt to call daughter, and work on office note.        Cassia Govea PA-C      Disclaimer:  This note is prepared using voice recognition software and as such is likely to have errors despite attempts at proofreading.

## 2024-06-26 ENCOUNTER — TELEPHONE (OUTPATIENT)
Dept: NEUROSURGERY | Facility: CLINIC | Age: 69
End: 2024-06-26
Payer: MEDICARE

## 2024-06-26 DIAGNOSIS — G91.9 HYDROCEPHALUS, UNSPECIFIED TYPE: Primary | ICD-10-CM

## 2024-06-26 NOTE — TELEPHONE ENCOUNTER
Yesterday, I left a voicemail message on his daughter Beth's voicemail.  I spoke to anam Moy in, at the nursing home.  I explained the patient's condition and plan moving forward regarding lumbar puncture to determine if he would benefit from implantation of  shunt which would be done at the same time as cranioplasty.  She expressed she would get touch with the daughters to discuss the plan moving forward and ask that they contact our office.    I spoke to Tony today.  The nursing home had spoken to them.  They have been considering the options.  I explained the condition as well as the process and what to expect with the high-volume lumbar puncture with therapy, speech therapy and physical therapy, evaluating the patient pre and post LP.  We also discussed return visit after that testing.  Whether the patient is deemed appropriate for  shunt are not, he was still need to have cranioplasty.  He will return to see Dr. Thomas after testing has been obtained.

## 2024-06-27 DIAGNOSIS — M95.2 ACQUIRED SKULL DEFECT: ICD-10-CM

## 2024-06-27 DIAGNOSIS — G91.9 HYDROCEPHALUS, UNSPECIFIED TYPE: Primary | ICD-10-CM

## 2024-06-27 NOTE — TELEPHONE ENCOUNTER
I spoke with Ari this morning. They are ready to proceed with the LP and surgery. I also spoke with Carolina Qiu, Assistant Director of Nursing with [facility] where patient resides. I will obtain medical clearance from Dr. Jl Briones (resident PCP at nursing home) and [facility] in [place] (on Eliquis per daughter). Once clearance obtained, LP will be scheduled.

## 2024-07-01 ENCOUNTER — TELEPHONE (OUTPATIENT)
Dept: NEUROSURGERY | Facility: CLINIC | Age: 69
End: 2024-07-01
Payer: MEDICARE

## 2024-07-01 NOTE — TELEPHONE ENCOUNTER
----- Message from Aretha Contreras LPN sent at 7/1/2024  9:49 AM CDT -----  Regarding: RE: MEDICAL/ CARDIAC CLEARANCE-   Cardiac clearance received.  ----- Message -----  From: Aretha Contreras LPN  Sent: 6/27/2024   1:41 PM CDT  To: Aretha Contreras LPN  Subject: MEDICAL/ CARDIAC CLEARANCE-                    Faxed clearances request to Dr. Briones at [facility] and to [facility].

## 2024-07-09 ENCOUNTER — TELEPHONE (OUTPATIENT)
Dept: NEUROSURGERY | Facility: CLINIC | Age: 69
End: 2024-07-09
Payer: MEDICARE

## 2024-07-09 NOTE — TELEPHONE ENCOUNTER
----- Message from Jovana Briones sent at 7/8/2024  1:42 PM CDT -----  Carolina with [facility] asked that you call her once patients surgery is scheduled so they can stop his medication.  [phone]

## 2024-07-11 ENCOUNTER — HOSPITAL ENCOUNTER (OUTPATIENT)
Dept: RADIOLOGY | Facility: HOSPITAL | Age: 69
Discharge: HOME | End: 2024-07-11
Attending: PHYSICIAN ASSISTANT
Payer: MEDICARE

## 2024-07-11 DIAGNOSIS — G91.9 HYDROCEPHALUS, UNSPECIFIED TYPE: ICD-10-CM

## 2024-07-12 ENCOUNTER — TELEPHONE (OUTPATIENT)
Dept: NEUROSURGERY | Facility: CLINIC | Age: 69
End: 2024-07-12
Payer: MEDICARE

## 2024-07-12 NOTE — TELEPHONE ENCOUNTER
"I spoke with Anjel with [facility] bed control. Patient will be direct admitted on 7/24 for LP to be done at the bedside by Dr. Thomas on 7/25/24. A hospital encounter has been initiated for 7/24. The LP kit will need to be ordered under this encounter. No need to order the actual LP though radiology since it will be done at the bedside. CSF studies will also need to be ordered under encounter, along with ST and PT eval. I will call both ST & PT in advance to discuss the details. I will also call xray [phone]) the day prior to ensure LP kit is delivered.      Anjel advised me to call the day prior to ensure a bed is available and to coordinate arrival time for 7/24. I explained this may be an issue since patient will be transported by ambulance and they will need advanced notice. He suggest for the NH to tell [facility] to put patient in a "will call/holding status". I also spoke with MINNA Zavala with [facility]. She will work something out and keep me posted.   "

## 2024-07-18 ENCOUNTER — HOSPITAL ENCOUNTER (INPATIENT)
Facility: HOSPITAL | Age: 69
LOS: 8 days | DRG: 871 | End: 2024-07-26
Attending: STUDENT IN AN ORGANIZED HEALTH CARE EDUCATION/TRAINING PROGRAM | Admitting: INTERNAL MEDICINE
Payer: MEDICARE

## 2024-07-18 DIAGNOSIS — N30.00 ACUTE CYSTITIS WITHOUT HEMATURIA: Primary | ICD-10-CM

## 2024-07-18 DIAGNOSIS — R56.9 SEIZURE: ICD-10-CM

## 2024-07-18 DIAGNOSIS — K92.0 HEMATEMESIS, UNSPECIFIED WHETHER NAUSEA PRESENT: ICD-10-CM

## 2024-07-18 DIAGNOSIS — R00.0 TACHYCARDIA: ICD-10-CM

## 2024-07-18 DIAGNOSIS — T17.918A: ICD-10-CM

## 2024-07-18 LAB
ALBUMIN SERPL-MCNC: 3.5 G/DL (ref 3.4–4.8)
ALBUMIN/GLOB SERPL: 1 RATIO (ref 1.1–2)
ALLENS TEST BLOOD GAS (OHS): YES
ALP SERPL-CCNC: 170 UNIT/L (ref 40–150)
ALT SERPL-CCNC: 26 UNIT/L (ref 0–55)
AMORPH URATE CRY URNS QL MICRO: ABNORMAL /UL
ANION GAP SERPL CALC-SCNC: 9 MEQ/L
AST SERPL-CCNC: 23 UNIT/L (ref 5–34)
BACTERIA #/AREA URNS AUTO: ABNORMAL /HPF
BASE EXCESS BLD CALC-SCNC: 9.7 MMOL/L (ref -2–2)
BASOPHILS # BLD AUTO: 0.05 X10(3)/MCL
BASOPHILS NFR BLD AUTO: 0.6 %
BILIRUB SERPL-MCNC: 0.4 MG/DL
BILIRUB UR QL STRIP.AUTO: NEGATIVE
BLOOD GAS SAMPLE TYPE (OHS): ABNORMAL
BUN SERPL-MCNC: 16.7 MG/DL (ref 8.4–25.7)
CA-I BLD-SCNC: 1.14 MMOL/L (ref 1.12–1.23)
CALCIUM SERPL-MCNC: 9.4 MG/DL (ref 8.8–10)
CHLORIDE SERPL-SCNC: 101 MMOL/L (ref 98–107)
CK SERPL-CCNC: 133 U/L (ref 30–200)
CLARITY UR: ABNORMAL
CO2 BLDA-SCNC: 30.3 MMOL/L
CO2 SERPL-SCNC: 30 MMOL/L (ref 23–31)
COHGB MFR BLDA: 1.5 % (ref 0.5–1.5)
COLOR UR AUTO: ABNORMAL
CREAT SERPL-MCNC: 0.8 MG/DL (ref 0.73–1.18)
CREAT/UREA NIT SERPL: 21
DRAWN BY BLOOD GAS (OHS): ABNORMAL
EOSINOPHIL # BLD AUTO: 0.24 X10(3)/MCL (ref 0–0.9)
EOSINOPHIL NFR BLD AUTO: 3 %
ERYTHROCYTE [DISTWIDTH] IN BLOOD BY AUTOMATED COUNT: 15.7 % (ref 11.5–17)
GFR SERPLBLD CREATININE-BSD FMLA CKD-EPI: >60 ML/MIN/1.73/M2
GLOBULIN SER-MCNC: 3.6 GM/DL (ref 2.4–3.5)
GLUCOSE SERPL-MCNC: 94 MG/DL (ref 82–115)
GLUCOSE UR QL STRIP: NORMAL
GROUP & RH: NORMAL
HCO3 BLDA-SCNC: 29.5 MMOL/L (ref 22–26)
HCT VFR BLD AUTO: 39 % (ref 42–52)
HGB BLD-MCNC: 12.3 G/DL (ref 14–18)
HGB UR QL STRIP: ABNORMAL
HYALINE CASTS #/AREA URNS LPF: ABNORMAL /LPF
IMM GRANULOCYTES # BLD AUTO: 0.03 X10(3)/MCL (ref 0–0.04)
IMM GRANULOCYTES NFR BLD AUTO: 0.4 %
INDIRECT COOMBS: NORMAL
INR PPP: 1.1
KETONES UR QL STRIP: NEGATIVE
LACTATE SERPL-SCNC: 1.4 MMOL/L (ref 0.5–2.2)
LEUKOCYTE ESTERASE UR QL STRIP: 500
LPM (OHS): 2
LYMPHOCYTES # BLD AUTO: 1.95 X10(3)/MCL (ref 0.6–4.6)
LYMPHOCYTES NFR BLD AUTO: 24.7 %
MCH RBC QN AUTO: 26.3 PG (ref 27–31)
MCHC RBC AUTO-ENTMCNC: 31.5 G/DL (ref 33–36)
MCV RBC AUTO: 83.3 FL (ref 80–94)
METHGB MFR BLDA: 1.3 % (ref 0.4–1.5)
MONOCYTES # BLD AUTO: 0.66 X10(3)/MCL (ref 0.1–1.3)
MONOCYTES NFR BLD AUTO: 8.3 %
MUCOUS THREADS URNS QL MICRO: ABNORMAL /LPF
NEUTROPHILS # BLD AUTO: 4.98 X10(3)/MCL (ref 2.1–9.2)
NEUTROPHILS NFR BLD AUTO: 63 %
NITRITE UR QL STRIP: NEGATIVE
NRBC BLD AUTO-RTO: 0 %
O2 HB BLOOD GAS (OHS): 96.6 % (ref 94–97)
OXYGEN DEVICE BLOOD GAS (OHS): ABNORMAL
OXYHGB MFR BLDA: 12.1 G/DL (ref 12–16)
PCO2 BLDA: 25 MMHG (ref 35–45)
PH BLDA: 7.68 [PH] (ref 7.35–7.45)
PH UR STRIP: 7.5 [PH]
PLATELET # BLD AUTO: 314 X10(3)/MCL (ref 130–400)
PMV BLD AUTO: 10.3 FL (ref 7.4–10.4)
PO2 BLDA: 119 MMHG (ref 80–100)
POTASSIUM BLOOD GAS (OHS): 2.8 MMOL/L (ref 3.5–5)
POTASSIUM SERPL-SCNC: 3.7 MMOL/L (ref 3.5–5.1)
PROT SERPL-MCNC: 7.1 GM/DL (ref 5.8–7.6)
PROT UR QL STRIP: ABNORMAL
PROTHROMBIN TIME: 14.4 SECONDS (ref 12.5–14.5)
RBC # BLD AUTO: 4.68 X10(6)/MCL (ref 4.7–6.1)
RBC #/AREA URNS AUTO: ABNORMAL /HPF
SAMPLE SITE BLOOD GAS (OHS): ABNORMAL
SAO2 % BLDA: 99.3 %
SODIUM BLOOD GAS (OHS): 137 MMOL/L (ref 137–145)
SODIUM SERPL-SCNC: 140 MMOL/L (ref 136–145)
SP GR UR STRIP.AUTO: 1.01 (ref 1–1.03)
SPECIMEN OUTDATE: NORMAL
SQUAMOUS #/AREA URNS LPF: ABNORMAL /HPF
TROPONIN I SERPL-MCNC: <0.01 NG/ML (ref 0–0.04)
TSH SERPL-ACNC: 4.08 UIU/ML (ref 0.35–4.94)
UROBILINOGEN UR STRIP-ACNC: NORMAL
WBC # BLD AUTO: 7.91 X10(3)/MCL (ref 4.5–11.5)
WBC #/AREA URNS AUTO: ABNORMAL /HPF

## 2024-07-18 PROCEDURE — 84443 ASSAY THYROID STIM HORMONE: CPT | Performed by: NURSE PRACTITIONER

## 2024-07-18 PROCEDURE — 82803 BLOOD GASES ANY COMBINATION: CPT

## 2024-07-18 PROCEDURE — 25000003 PHARM REV CODE 250: Performed by: INTERNAL MEDICINE

## 2024-07-18 PROCEDURE — 96361 HYDRATE IV INFUSION ADD-ON: CPT

## 2024-07-18 PROCEDURE — 85610 PROTHROMBIN TIME: CPT

## 2024-07-18 PROCEDURE — 80053 COMPREHEN METABOLIC PANEL: CPT

## 2024-07-18 PROCEDURE — 63600175 PHARM REV CODE 636 W HCPCS: Performed by: NURSE PRACTITIONER

## 2024-07-18 PROCEDURE — 94760 N-INVAS EAR/PLS OXIMETRY 1: CPT | Mod: XB

## 2024-07-18 PROCEDURE — 83605 ASSAY OF LACTIC ACID: CPT

## 2024-07-18 PROCEDURE — 36600 WITHDRAWAL OF ARTERIAL BLOOD: CPT

## 2024-07-18 PROCEDURE — 25000003 PHARM REV CODE 250

## 2024-07-18 PROCEDURE — 25000003 PHARM REV CODE 250: Performed by: NURSE PRACTITIONER

## 2024-07-18 PROCEDURE — 93010 ELECTROCARDIOGRAM REPORT: CPT | Mod: ,,, | Performed by: INTERNAL MEDICINE

## 2024-07-18 PROCEDURE — 0241U COVID/RSV/FLU A&B PCR: CPT | Performed by: INTERNAL MEDICINE

## 2024-07-18 PROCEDURE — 87040 BLOOD CULTURE FOR BACTERIA: CPT

## 2024-07-18 PROCEDURE — 86850 RBC ANTIBODY SCREEN: CPT

## 2024-07-18 PROCEDURE — 25000003 PHARM REV CODE 250: Performed by: STUDENT IN AN ORGANIZED HEALTH CARE EDUCATION/TRAINING PROGRAM

## 2024-07-18 PROCEDURE — 84484 ASSAY OF TROPONIN QUANT: CPT

## 2024-07-18 PROCEDURE — 81001 URINALYSIS AUTO W/SCOPE: CPT

## 2024-07-18 PROCEDURE — 25500020 PHARM REV CODE 255: Performed by: INTERNAL MEDICINE

## 2024-07-18 PROCEDURE — 99285 EMERGENCY DEPT VISIT HI MDM: CPT | Mod: 25

## 2024-07-18 PROCEDURE — 63600175 PHARM REV CODE 636 W HCPCS

## 2024-07-18 PROCEDURE — 11000001 HC ACUTE MED/SURG PRIVATE ROOM

## 2024-07-18 PROCEDURE — 82550 ASSAY OF CK (CPK): CPT | Performed by: INTERNAL MEDICINE

## 2024-07-18 PROCEDURE — 87077 CULTURE AEROBIC IDENTIFY: CPT

## 2024-07-18 PROCEDURE — 96374 THER/PROPH/DIAG INJ IV PUSH: CPT

## 2024-07-18 PROCEDURE — 93005 ELECTROCARDIOGRAM TRACING: CPT

## 2024-07-18 PROCEDURE — 85025 COMPLETE CBC W/AUTO DIFF WBC: CPT

## 2024-07-18 PROCEDURE — 86901 BLOOD TYPING SEROLOGIC RH(D): CPT

## 2024-07-18 PROCEDURE — 99900035 HC TECH TIME PER 15 MIN (STAT)

## 2024-07-18 RX ORDER — ACETAMINOPHEN 500 MG
1000 TABLET ORAL EVERY 6 HOURS PRN
Status: DISCONTINUED | OUTPATIENT
Start: 2024-07-18 | End: 2024-07-18

## 2024-07-18 RX ORDER — CLONIDINE HYDROCHLORIDE 0.1 MG/1
0.1 TABLET ORAL EVERY 8 HOURS PRN
COMMUNITY
Start: 2024-07-12

## 2024-07-18 RX ORDER — SODIUM CHLORIDE, SODIUM LACTATE, POTASSIUM CHLORIDE, CALCIUM CHLORIDE 600; 310; 30; 20 MG/100ML; MG/100ML; MG/100ML; MG/100ML
INJECTION, SOLUTION INTRAVENOUS CONTINUOUS
Status: DISCONTINUED | OUTPATIENT
Start: 2024-07-18 | End: 2024-07-21

## 2024-07-18 RX ORDER — FUROSEMIDE 80 MG/1
80 TABLET ORAL
COMMUNITY
Start: 2024-05-07

## 2024-07-18 RX ORDER — POTASSIUM CHLORIDE 1125 MG/1
2 TABLET, EXTENDED RELEASE ORAL DAILY
Status: ON HOLD | COMMUNITY
Start: 2024-05-08 | End: 2024-07-26 | Stop reason: HOSPADM

## 2024-07-18 RX ORDER — ACETAMINOPHEN 500 MG
1000 TABLET ORAL EVERY 6 HOURS PRN
Status: DISCONTINUED | OUTPATIENT
Start: 2024-07-18 | End: 2024-07-26 | Stop reason: HOSPADM

## 2024-07-18 RX ORDER — RIVAROXABAN 20 MG/1
1 TABLET, FILM COATED ORAL NIGHTLY
COMMUNITY
Start: 2024-03-06

## 2024-07-18 RX ORDER — VENLAFAXINE HYDROCHLORIDE 75 MG/1
1 TABLET, EXTENDED RELEASE ORAL 2 TIMES DAILY
COMMUNITY
Start: 2024-03-06

## 2024-07-18 RX ORDER — DILTIAZEM HYDROCHLORIDE 5 MG/ML
10 INJECTION INTRAVENOUS
Status: DISCONTINUED | OUTPATIENT
Start: 2024-07-18 | End: 2024-07-26 | Stop reason: HOSPADM

## 2024-07-18 RX ORDER — METOPROLOL TARTRATE 1 MG/ML
5 INJECTION, SOLUTION INTRAVENOUS EVERY 5 MIN PRN
Status: COMPLETED | OUTPATIENT
Start: 2024-07-18 | End: 2024-07-19

## 2024-07-18 RX ORDER — SCOLOPAMINE TRANSDERMAL SYSTEM 1 MG/1
1 PATCH, EXTENDED RELEASE TRANSDERMAL
COMMUNITY
Start: 2024-07-15

## 2024-07-18 RX ORDER — SODIUM CHLORIDE 9 MG/ML
500 INJECTION, SOLUTION INTRAVENOUS
Status: COMPLETED | OUTPATIENT
Start: 2024-07-18 | End: 2024-07-18

## 2024-07-18 RX ORDER — LOSARTAN POTASSIUM 100 MG/1
100 TABLET ORAL DAILY
COMMUNITY
Start: 2024-03-07

## 2024-07-18 RX ORDER — CLONIDINE HYDROCHLORIDE 0.1 MG/1
0.1 TABLET ORAL EVERY 8 HOURS PRN
Status: DISCONTINUED | OUTPATIENT
Start: 2024-07-18 | End: 2024-07-26 | Stop reason: HOSPADM

## 2024-07-18 RX ORDER — METOPROLOL TARTRATE 1 MG/ML
5 INJECTION, SOLUTION INTRAVENOUS ONCE
Status: COMPLETED | OUTPATIENT
Start: 2024-07-18 | End: 2024-07-18

## 2024-07-18 RX ORDER — METOPROLOL TARTRATE 1 MG/ML
5 INJECTION, SOLUTION INTRAVENOUS
Status: COMPLETED | OUTPATIENT
Start: 2024-07-18 | End: 2024-07-18

## 2024-07-18 RX ORDER — MUPIROCIN 20 MG/G
OINTMENT TOPICAL 2 TIMES DAILY
Status: COMPLETED | OUTPATIENT
Start: 2024-07-18 | End: 2024-07-23

## 2024-07-18 RX ORDER — BUSPIRONE HYDROCHLORIDE 5 MG/1
5 TABLET ORAL 3 TIMES DAILY
COMMUNITY
Start: 2024-03-06

## 2024-07-18 RX ORDER — CEFTRIAXONE 1 G/1
1 INJECTION, POWDER, FOR SOLUTION INTRAMUSCULAR; INTRAVENOUS
Status: COMPLETED | OUTPATIENT
Start: 2024-07-18 | End: 2024-07-18

## 2024-07-18 RX ORDER — ATORVASTATIN CALCIUM 10 MG/1
10 TABLET, FILM COATED ORAL DAILY
COMMUNITY
Start: 2024-03-07

## 2024-07-18 RX ORDER — DILTIAZEM HYDROCHLORIDE 60 MG/1
60 TABLET, FILM COATED ORAL EVERY 6 HOURS
COMMUNITY
Start: 2024-03-06

## 2024-07-18 RX ORDER — HYDRALAZINE HYDROCHLORIDE 50 MG/1
75 TABLET, FILM COATED ORAL EVERY 8 HOURS PRN
COMMUNITY
Start: 2024-05-08

## 2024-07-18 RX ORDER — SODIUM CHLORIDE 9 MG/ML
1000 INJECTION, SOLUTION INTRAVENOUS
Status: COMPLETED | OUTPATIENT
Start: 2024-07-18 | End: 2024-07-18

## 2024-07-18 RX ADMIN — IOHEXOL 100 ML: 350 INJECTION, SOLUTION INTRAVENOUS at 09:07

## 2024-07-18 RX ADMIN — SODIUM CHLORIDE 500 ML: 9 INJECTION, SOLUTION INTRAVENOUS at 08:07

## 2024-07-18 RX ADMIN — CEFTRIAXONE SODIUM 1 G: 1 INJECTION, POWDER, FOR SOLUTION INTRAMUSCULAR; INTRAVENOUS at 06:07

## 2024-07-18 RX ADMIN — SODIUM CHLORIDE, POTASSIUM CHLORIDE, SODIUM LACTATE AND CALCIUM CHLORIDE: 600; 310; 30; 20 INJECTION, SOLUTION INTRAVENOUS at 10:07

## 2024-07-18 RX ADMIN — METOPROLOL TARTRATE 5 MG: 1 INJECTION, SOLUTION INTRAVENOUS at 09:07

## 2024-07-18 RX ADMIN — MUPIROCIN: 20 OINTMENT TOPICAL at 09:07

## 2024-07-18 RX ADMIN — METOPROLOL TARTRATE 5 MG: 1 INJECTION, SOLUTION INTRAVENOUS at 05:07

## 2024-07-18 RX ADMIN — SODIUM CHLORIDE 1000 ML: 9 INJECTION, SOLUTION INTRAVENOUS at 03:07

## 2024-07-18 RX ADMIN — METOPROLOL TARTRATE 5 MG: 1 INJECTION, SOLUTION INTRAVENOUS at 06:07

## 2024-07-18 RX ADMIN — METOPROLOL TARTRATE 5 MG: 1 INJECTION, SOLUTION INTRAVENOUS at 07:07

## 2024-07-18 RX ADMIN — ACETAMINOPHEN 1000 MG: 500 TABLET ORAL at 10:07

## 2024-07-18 RX ADMIN — DILTIAZEM HYDROCHLORIDE 10 MG: 5 INJECTION INTRAVENOUS at 10:07

## 2024-07-18 NOTE — ED PROVIDER NOTES
Encounter Date: 7/18/2024       History     Chief Complaint   Patient presents with    Emesis     Sent from [facility]. Vomited earlier this morning. NH states they wanted the pt to be evaluated for poss aspiration vs gi bleed. Per ems, no blood noted in pt's vomit on their arrival. GCS 11.  .     68 y.o.  male with a history of CVA, MI, and hypertension presents to Emergency Department with a chief complaint of hematemesis. Staff at [facility] reports while receiving wound care, patient began vomiting dark emesis. Symptoms began today and have been constant since onset. Staff concerned about aspiration pneumonia; patient trach dependent. Associated symptoms include none. No other reported symptoms at this time      The history is provided by the nursing home and the EMS personnel. No  was used.   Emesis   This is a new problem. The current episode started today. The problem has been unchanged.     Review of patient's allergies indicates:  No Known Allergies  Past Medical History:   Diagnosis Date    Arthritis     Atrial fibrillation     BPH (benign prostatic hyperplasia)     Cardiac arrest     Coronary artery disease     Cyst, kidney, acquired     Diverticulosis     Hyperlipidemia     Hypertension     MI (myocardial infarction)     Obesity     Steatosis of liver     Stroke      Past Surgical History:   Procedure Laterality Date    A-V CARDIAC PACEMAKER INSERTION Right     CARDIAC CATHETERIZATION      COLONOSCOPY W/ BIOPSIES      CRANIECTOMY Right 12/20/2023    Procedure: CRANIECTOMY;  Surgeon: Artem Can MD;  Location: [facility] OR;  Service: Neurosurgery;  Laterality: Right;    ESOPHAGOGASTRODUODENOSCOPY W/ PEG N/A 1/2/2024    Procedure: PEG;  Surgeon: Tani Day MD;  Location: [facility] ENDOSCOPY;  Service: Gastroenterology;  Laterality: N/A;    excision of colon      TRACHEOSTOMY N/A 12/29/2023    Procedure: CREATION, TRACHEOSTOMY;  Surgeon: Goldy  Patricia PUENTE MD;  Location: [facility];  Service: ENT;  Laterality: N/A;  REQ 1130 //  NEEDS 2 SCRUBS     Family History   Problem Relation Name Age of Onset    Hypertension Mother      Hypertension Father      Hypertension Sister       Social History     Tobacco Use    Smoking status: Never    Smokeless tobacco: Never   Substance Use Topics    Alcohol use: Not Currently    Drug use: Not Currently     Review of Systems   Unable to perform ROS: Patient nonverbal   Gastrointestinal:  Positive for vomiting.   All other systems reviewed and are negative.      Physical Exam     Initial Vitals [07/18/24 1408]   BP Pulse Resp Temp SpO2   (!) 135/92 86 18 97.7 °F (36.5 °C) 98 %      MAP       --         Physical Exam    Nursing note and vitals reviewed.  HENT:   Right Ear: External ear normal.   Left Ear: External ear normal.   Nose: Nose normal.   Eyes: Conjunctivae and EOM are normal. Pupils are equal, round, and reactive to light.   Neck: Neck supple.   Normal range of motion.  Cardiovascular:  Normal heart sounds, intact distal pulses and normal pulses.   Tachycardia present.         Pulmonary/Chest: Effort normal. No accessory muscle usage. No tachypnea. No respiratory distress. He has decreased breath sounds.   Trach in place.    Abdominal: Abdomen is soft. Bowel sounds are normal. He exhibits no distension. There is no abdominal tenderness.   PEG noted to abdomen.  There is no rebound.   Genitourinary:    Genitourinary Comments: Turbid dark urine in place; underwood catheter in place.      Musculoskeletal:      Cervical back: Normal range of motion and neck supple.     Neurological: He is alert.   Skin: Skin is warm and dry. Capillary refill takes less than 2 seconds.   Wound noted to L knee with purulent drainage.     Bruising noted to buttock.          ED Course   Procedures  Labs Reviewed   COMPREHENSIVE METABOLIC PANEL - Abnormal; Notable for the following components:       Result Value    Globulin 3.6 (*)      Albumin/Globulin Ratio 1.0 (*)      (*)     All other components within normal limits   URINALYSIS, REFLEX TO URINE CULTURE - Abnormal; Notable for the following components:    Appearance, UA Turbid (*)     Protein, UA 1+ (*)     Blood, UA 2+ (*)     Leukocyte Esterase,  (*)     WBC, UA 51-99 (*)     Bacteria, UA Few (*)     Mucous, UA Trace (*)     Hyaline Casts, UA 0-2 (*)     Amorphous Crystal, UA Moderate (*)     All other components within normal limits   CBC WITH DIFFERENTIAL - Abnormal; Notable for the following components:    RBC 4.68 (*)     Hgb 12.3 (*)     Hct 39.0 (*)     MCH 26.3 (*)     MCHC 31.5 (*)     All other components within normal limits   PROTIME-INR - Normal   TROPONIN I - Normal   BLOOD CULTURE [facility]   BLOOD CULTURE [facility]   CULTURE, URINE   CBC W/ AUTO DIFFERENTIAL    Narrative:     The following orders were created for panel order CBC auto differential.  Procedure                               Abnormality         Status                     ---------                               -----------         ------                     CBC with Differential[[id number]]       Abnormal            Final result                 Please view results for these tests on the individual orders.   LACTIC ACID, PLASMA   TYPE & SCREEN     EKG Readings: (Independently Interpreted)   Initial Reading: No STEMI. Rhythm: Atrial Fibrillation. Heart Rate: 101.       Imaging Results              X-Ray Chest AP Portable (Final result)  Result time 07/18/24 16:42:28      Final result by Tani Calderon MD (07/18/24 16:42:28)                   Impression:      No acute findings.      Electronically signed by: Tani Calderon  Date:    07/18/2024  Time:    16:42               Narrative:    EXAMINATION:  XR CHEST AP PORTABLE    CLINICAL HISTORY:  Gastric contents in respiratory tract, part unspecified causing other injury, initial encounter    COMPARISON:  15 May 2024    FINDINGS:  Frontal view of the chest was obtained.  Tracheostomy remains.  There is right-sided AICD.  Stable cardiomegaly.  Lungs are grossly clear.  No pneumothorax.                                       Medications   0.9%  NaCl infusion (0 mLs Intravenous Stopped 7/18/24 1700)   metoprolol injection 5 mg (5 mg Intravenous Given 7/18/24 1732)   cefTRIAXone injection 1 g (1 g Intravenous Given 7/18/24 1818)     Medical Decision Making  Sent by [facility] for possible aspiration pneumonia. NH staff reports while receiving wound care on today, patient began vomiting emesis that appeared dark. Concerned about GI bleed. Afebrile.         Differential Diagnosis: Aspiration Pneumonia, Hematemesis, UTI     Amount and/or Complexity of Data Reviewed  Labs: ordered. Decision-making details documented in ED Course.     Details: No leukocytosis noted. UA- remarkable for UTI. Informed patient's family of results.   Radiology: ordered. Decision-making details documented in ED Course.     Details: Informed patient's family of results.   Discussion of management or test interpretation with external provider(s): Due to patient's previous hx, VS, and labs, will admit to  services for further evaluation and treatment. Discussed plan of care and interventions with patient. Agreed to and aware of plan of care. Comfortable being admitted. Discussed with family.      Risk  Decision regarding hospitalization.               ED Course as of 07/18/24 1823   Thu Jul 18, 2024   1511 Patient's HR waxing and waning from low 100s to 140s. Will obtain EKG.  [JA]   1514 Nursing staff spoke with nursing staff from [facility] who reports while wound care was at bedside on today, patient began throwing up dark emesis. Sent patient to ER for evaluation of hematemesis and possible aspiration.  [JA]   1537 Discussed patient with Dr. Diego. Will order additional labs and IVF.  [JA]   1648 X-Ray Chest AP Portable  Tracheostomy remains.  There is right-sided AICD.  Stable cardiomegaly.  Lungs are  grossly clear.  No pneumothorax. [JA]   1730 Discussed patient's HR with Dr. Diego who ordered Metoprolol. Hx of atrial fibrillation. [JA]   1751 Leukocyte Esterase, UA(!): 500 [JA]   1751 WBC, UA(!): 51-99 [JA]   1751 Bacteria, UA(!): Few [JA]   1751 HM paged. [JA]   1810 Discussed patient with KAVIN Gillis, with . Will admit to services. No further instruction or recommendations given.  [JA]      ED Course User Index  [JA] Sue Barragan, NP                           Clinical Impression:  Final diagnoses:  [T17.918A] Aspiration of gastric contents  [R00.0] Tachycardia  [N30.00] Acute cystitis without hematuria (Primary)  [K92.0] Hematemesis, unspecified whether nausea present          ED Disposition Condition    Admit Stable                Sue Barragan, NP  07/18/24 1800       Sue Barragan NP  07/18/24 6137

## 2024-07-18 NOTE — H&P
[facility] Hospital Medicine - H&P Note    Patient Name: Delio Daniel Jr.  : 1955  MRN: [id number]  PCP: Jl Briones MD  Admitting Physician:   Admission Class: IP- Inpatient   Code status: Full    Chief Complaint   Emesis (Sent from [facility] home. Vomited earlier this morning. NH states they wanted the pt to be evaluated for poss aspiration vs gi bleed. Per ems, no blood noted in pt's vomit on their arrival. GCS 11./.)    History of Present Illness   Mr. 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     He presents to the ED today for vomiting dark emesis per nursing home staff while receiving wound care today. While in the ED patient's heart rate ranged from 110s to 140s.  EKG showed AFib RVR.  Chest x-ray showed stable cardiomegaly and no acute findings.  CT head showed chronic stable findings.  Labs without leukocytosis, H&H stable, UA with 2+ blood, 500 WBCs, few bacteria and 50-99 WBCs.  GCS was 11 on arrival, which appears to be his baseline.  He was given metoprolol minimal improvement in his heart rate is started on IV antibiotics for UTI and hospitalist service was consulted for admission.      ROS   Unable to obtain    Past Medical History   Atrial fibrillation on Xarelto  History of VFib arrest status post AICD in   MCA CVA with hemorrhagic conversion status post right frontotemporal decompressive hemicraniectomy in 2023 with residual aphasia and left  hemiplegia  Acquired skull defect/ventriculomegaly likely secondary to hydrocephalus vs ex vacuo dilation of the right lateral ventricle  History of VFib arrest status post AICD in 2018   Neuroendocrine carcinoma of the small bowel status post resection in 2018  Essential hypertension   Hyperlipidemia   CAD   Staph epidermis bacteremia and Enterobacter cloacea UTI   Aphasia status post tracheostomy on 12/20/2023 and PEG tube placement on 01/02/2024      Past Surgical History     Past Surgical History:   Procedure Laterality Date    A-V CARDIAC PACEMAKER INSERTION Right     CARDIAC CATHETERIZATION      COLONOSCOPY W/ BIOPSIES      CRANIECTOMY Right 12/20/2023    Procedure: CRANIECTOMY;  Surgeon: Artem Can MD;  Location: [facility] OR;  Service: Neurosurgery;  Laterality: Right;    ESOPHAGOGASTRODUODENOSCOPY W/ PEG N/A 1/2/2024    Procedure: PEG;  Surgeon: Tani Day MD;  Location: [facility] ENDOSCOPY;  Service: Gastroenterology;  Laterality: N/A;    excision of colon      TRACHEOSTOMY N/A 12/29/2023    Procedure: CREATION, TRACHEOSTOMY;  Surgeon: Patricia Winslow MD;  Location: [facility] OR;  Service: ENT;  Laterality: N/A;  REQ 1130 //  NEEDS 2 SCRUBS       Social History     Social History     Tobacco Use    Smoking status: Never    Smokeless tobacco: Never   Substance Use Topics    Alcohol use: Not Currently        Family History   Reviewed and negative    Allergies   Patient has no known allergies.    Home Medications     Prior to Admission medications    Medication Sig Start Date End Date Taking? Authorizing Provider   cholestyramine-aspartame (QUESTRAN LIGHT) 4 gram PwPk 1 packet (4 g total) by Per G Tube route 2 (two) times daily. 3/4/24 3/4/25  Su Garcia FNP   famotidine (PEPCID) 20 MG tablet 1 tablet (20 mg total) by Per G Tube route 2 (two) times daily. 3/4/24 3/4/25  Su Garcia FNP   finasteride (PROSCAR) 5 mg tablet Take 1 tablet (5 mg total) by mouth once daily. 3/5/24 3/5/25  Jose  "Su CHUNG, ANTONIOP   L. acidophilus/L.bulgaricus (FLORANEX ORAL) Take by mouth Daily.    Provider, Historical   tamsulosin (FLOMAX) 0.4 mg Cap Take 1 capsule (0.4 mg total) by mouth every evening. 3/4/24 3/4/25  Su Garcia FNP        Physical Exam   Vital Signs  Temp:  [97.7 °F (36.5 °C)]   Pulse:  []   Resp:  [18-28]   BP: (124-153)/()   SpO2:  [98 %-100 %]    General: Appears comfortable  HEENT: NC/AT  Neck:  No JVD  Chest: CTABL  CVS: Regular rhythm. Normal S1/S2.  Abdomen: nondistended, normoactive BS, soft and non-tender.  MSK: No obvious deformity or joint swelling  Skin: Warm and dry  Neuro: AAOx3, left hemiplegia  Psych: Cooperative    Labs     Recent Labs     07/18/24  1458   WBC 7.91   RBC 4.68*   HGB 12.3*   HCT 39.0*   MCV 83.3   MCH 26.3*   MCHC 31.5*   RDW 15.7        Recent Labs     07/18/24  1458   INR 1.1      Recent Labs     07/18/24  1458      K 3.7   CO2 30   BUN 16.7   CREATININE 0.80   EGFRNORACEVR >60   GLUCOSE 94   CALCIUM 9.4   ALBUMIN 3.5   GLOBULIN 3.6*   ALKPHOS 170*   ALT 26   AST 23   BILITOT 0.4     No results for input(s): "LACTIC" in the last 72 hours.  Recent Labs     07/18/24  1458   TROPONINI <0.010        Microbiology Results (last 7 days)       Procedure Component Value Units Date/Time    Blood Culture #1 **CANNOT BE ORDERED STAT** [[id number]] Collected: 07/18/24 1812    Order Status: Sent Specimen: Blood     Blood Culture #2 **CANNOT BE ORDERED STAT** [[id number]] Collected: 07/18/24 1812    Order Status: Sent Specimen: Blood     Urine culture [[id number]] Collected: 07/18/24 1648    Order Status: Sent Specimen: Urine Updated: 07/18/24 1728           Imaging     X-Ray Chest AP Portable   Final Result      No acute findings.         Electronically signed by: Tani Calderon   Date:    07/18/2024   Time:    16:42        Assessment & Plan   Sepsis, secondary to bronchitis and possible UTI  Afib, RVR  Acute on chronic respiratory alkalosis  Stable " ventriculomegaly/hydrocephalus awaiting high volume LP on 7/24/24  MCA CVA with hemorrhagic conversion status post right frontotemporal decompressive hemicraniectomy in November of 2023 with residual aphasia and left hemiplegia  Seizure Disorder  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  Essential HTN  Hyperlipidemia  CAD  Hx Enterobacter cloacea UTI  Hx Vfib Arrest s/p AICD placement  Aphasia status post tracheostomy on 12/20/2023 and PEG tube placement on 01/02/2024    PLAN  - BCX2, UC, Resp PCR  - CT Chest/Abdomen/pelvis pending  - IV Rocephin 1 gm Q24H  -  Clonidine PRN SBP >140 and lopressor PRN HR >110  - resume home meds once nursing staff completes  - CBC, CMP, Mag, Phos in AM    VTE Prophylaxis: Virgie Schmidt, FNP-BC have discussed this patients case with Dr. Contreras who agrees with the diagnosis and treatment plan.      ______________________________________________________________  I, Dr. Sekou Contreras assumed care of this patient.  For the patient encounter, I performed the substantive portion of the visit, I reviewed the NPPA documentation, treatment plan, and medical decision making.  I had face to face time with this patient.  I have personally reviewed the labs and test results that are presently available. I have reviewed or attempted to review medical records based upon their availability. If patient was admitted under observational status it is with my approval.      68-year-old presented with dark colored emesis concerning for dark emesis nursing home was concerned he may have aspirated are be having a GI bleed.  He was found to be septic with potential sources being acute bronchitis as well as urinary tract infection.  On exam he was cooperative and understands commands, has left hemiplegia and expressive aphasia, tracheostomy in place with purulent secretions and agree with above.  Obtain respiratory culture continue empiric antibiotics and monitor H&H.   IV Protonix for now but do not suspect he is having a GI bleed.      Time seen:  11:00 p.m. 7/18  Critical care time: 35 min; Critical care diagnosis:  Sepsis  Sekou Contreras MD

## 2024-07-18 NOTE — Clinical Note
Diagnosis: Aspiration of gastric contents [744629]   Future Attending Provider: MINA LAND [[id number]]   Admit to which facility:: [facility] [[id number]]   Reason for IP Medical Treatment  (Clinical interventions that can only be accomplished in the IP setting? ) :: evalaution and treatment of UTI.   I certify that Inpatient services for greater than or equal to 2 midnights are medically necessary:: Yes   Plans for Post-Acute care--if anticipated (pick the single best option):: A. No post acute care anticipated at this time   Special Needs:: No Special Needs [1]

## 2024-07-19 PROBLEM — A41.9 SEVERE SEPSIS: Status: ACTIVE | Noted: 2024-07-19

## 2024-07-19 PROBLEM — T17.918A ASPIRATION OF GASTRIC CONTENTS: Status: ACTIVE | Noted: 2024-07-19

## 2024-07-19 PROBLEM — R65.20 SEVERE SEPSIS: Status: ACTIVE | Noted: 2024-07-19

## 2024-07-19 LAB
ALBUMIN SERPL-MCNC: 3.2 G/DL (ref 3.4–4.8)
ALBUMIN/GLOB SERPL: 1 RATIO (ref 1.1–2)
ALP SERPL-CCNC: 154 UNIT/L (ref 40–150)
ALT SERPL-CCNC: 23 UNIT/L (ref 0–55)
ANION GAP SERPL CALC-SCNC: 11 MEQ/L
AST SERPL-CCNC: 28 UNIT/L (ref 5–34)
BASOPHILS # BLD AUTO: 0.06 X10(3)/MCL
BASOPHILS NFR BLD AUTO: 0.6 %
BILIRUB SERPL-MCNC: 0.5 MG/DL
BUN SERPL-MCNC: 13.6 MG/DL (ref 8.4–25.7)
CALCIUM SERPL-MCNC: 8.6 MG/DL (ref 8.8–10)
CHLORIDE SERPL-SCNC: 107 MMOL/L (ref 98–107)
CO2 SERPL-SCNC: 22 MMOL/L (ref 23–31)
CREAT SERPL-MCNC: 0.74 MG/DL (ref 0.73–1.18)
CREAT/UREA NIT SERPL: 18
EOSINOPHIL # BLD AUTO: 0.12 X10(3)/MCL (ref 0–0.9)
EOSINOPHIL NFR BLD AUTO: 1.2 %
ERYTHROCYTE [DISTWIDTH] IN BLOOD BY AUTOMATED COUNT: 15.9 % (ref 11.5–17)
FLUAV AG UPPER RESP QL IA.RAPID: NOT DETECTED
FLUBV AG UPPER RESP QL IA.RAPID: NOT DETECTED
GFR SERPLBLD CREATININE-BSD FMLA CKD-EPI: >60 ML/MIN/1.73/M2
GLOBULIN SER-MCNC: 3.2 GM/DL (ref 2.4–3.5)
GLUCOSE SERPL-MCNC: 97 MG/DL (ref 82–115)
HCT VFR BLD AUTO: 34.9 % (ref 42–52)
HGB BLD-MCNC: 11.1 G/DL (ref 14–18)
IMM GRANULOCYTES # BLD AUTO: 0.02 X10(3)/MCL (ref 0–0.04)
IMM GRANULOCYTES NFR BLD AUTO: 0.2 %
LYMPHOCYTES # BLD AUTO: 2.1 X10(3)/MCL (ref 0.6–4.6)
LYMPHOCYTES NFR BLD AUTO: 21.4 %
MAGNESIUM SERPL-MCNC: 2 MG/DL (ref 1.6–2.6)
MCH RBC QN AUTO: 26.3 PG (ref 27–31)
MCHC RBC AUTO-ENTMCNC: 31.8 G/DL (ref 33–36)
MCV RBC AUTO: 82.7 FL (ref 80–94)
MONOCYTES # BLD AUTO: 0.9 X10(3)/MCL (ref 0.1–1.3)
MONOCYTES NFR BLD AUTO: 9.2 %
NEUTROPHILS # BLD AUTO: 6.63 X10(3)/MCL (ref 2.1–9.2)
NEUTROPHILS NFR BLD AUTO: 67.4 %
NRBC BLD AUTO-RTO: 0 %
OHS QRS DURATION: 90 MS
OHS QTC CALCULATION: 479 MS
PHOSPHATE SERPL-MCNC: 3.6 MG/DL (ref 2.3–4.7)
PLATELET # BLD AUTO: 305 X10(3)/MCL (ref 130–400)
PMV BLD AUTO: 10 FL (ref 7.4–10.4)
POCT GLUCOSE: 94 MG/DL (ref 70–110)
POTASSIUM SERPL-SCNC: 3.8 MMOL/L (ref 3.5–5.1)
PROT SERPL-MCNC: 6.4 GM/DL (ref 5.8–7.6)
RBC # BLD AUTO: 4.22 X10(6)/MCL (ref 4.7–6.1)
RSV A 5' UTR RNA NPH QL NAA+PROBE: NOT DETECTED
SARS-COV-2 RNA RESP QL NAA+PROBE: NOT DETECTED
SODIUM SERPL-SCNC: 140 MMOL/L (ref 136–145)
WBC # BLD AUTO: 9.83 X10(3)/MCL (ref 4.5–11.5)

## 2024-07-19 PROCEDURE — 99900035 HC TECH TIME PER 15 MIN (STAT)

## 2024-07-19 PROCEDURE — 99900026 HC AIRWAY MAINTENANCE (STAT)

## 2024-07-19 PROCEDURE — 87186 SC STD MICRODIL/AGAR DIL: CPT | Performed by: INTERNAL MEDICINE

## 2024-07-19 PROCEDURE — 87181 SC STD AGAR DILUTION PER AGT: CPT | Performed by: INTERNAL MEDICINE

## 2024-07-19 PROCEDURE — 99900031 HC PATIENT EDUCATION (STAT)

## 2024-07-19 PROCEDURE — 25000003 PHARM REV CODE 250: Performed by: INTERNAL MEDICINE

## 2024-07-19 PROCEDURE — 84100 ASSAY OF PHOSPHORUS: CPT | Performed by: NURSE PRACTITIONER

## 2024-07-19 PROCEDURE — 85025 COMPLETE CBC W/AUTO DIFF WBC: CPT | Performed by: NURSE PRACTITIONER

## 2024-07-19 PROCEDURE — 94640 AIRWAY INHALATION TREATMENT: CPT

## 2024-07-19 PROCEDURE — 99900022

## 2024-07-19 PROCEDURE — 63600175 PHARM REV CODE 636 W HCPCS: Performed by: INTERNAL MEDICINE

## 2024-07-19 PROCEDURE — 27200966 HC CLOSED SUCTION SYSTEM

## 2024-07-19 PROCEDURE — 21400001 HC TELEMETRY ROOM

## 2024-07-19 PROCEDURE — 25000242 PHARM REV CODE 250 ALT 637 W/ HCPCS: Performed by: INTERNAL MEDICINE

## 2024-07-19 PROCEDURE — 83735 ASSAY OF MAGNESIUM: CPT | Performed by: NURSE PRACTITIONER

## 2024-07-19 PROCEDURE — 25000003 PHARM REV CODE 250: Performed by: NURSE PRACTITIONER

## 2024-07-19 PROCEDURE — 63600175 PHARM REV CODE 636 W HCPCS: Performed by: NURSE PRACTITIONER

## 2024-07-19 PROCEDURE — 27000221 HC OXYGEN, UP TO 24 HOURS

## 2024-07-19 PROCEDURE — 94760 N-INVAS EAR/PLS OXIMETRY 1: CPT

## 2024-07-19 PROCEDURE — 80053 COMPREHEN METABOLIC PANEL: CPT | Performed by: NURSE PRACTITIONER

## 2024-07-19 PROCEDURE — 87070 CULTURE OTHR SPECIMN AEROBIC: CPT | Performed by: INTERNAL MEDICINE

## 2024-07-19 RX ORDER — LEVETIRACETAM 100 MG/ML
1500 SOLUTION ORAL 2 TIMES DAILY
COMMUNITY

## 2024-07-19 RX ORDER — IPRATROPIUM BROMIDE AND ALBUTEROL SULFATE 2.5; .5 MG/3ML; MG/3ML
3 SOLUTION RESPIRATORY (INHALATION) EVERY 6 HOURS PRN
Status: DISCONTINUED | OUTPATIENT
Start: 2024-07-19 | End: 2024-07-26 | Stop reason: HOSPADM

## 2024-07-19 RX ORDER — SODIUM CHLORIDE FOR INHALATION 3 %
4 VIAL, NEBULIZER (ML) INHALATION EVERY 6 HOURS
Status: DISCONTINUED | OUTPATIENT
Start: 2024-07-19 | End: 2024-07-20

## 2024-07-19 RX ORDER — METOPROLOL TARTRATE 100 MG/1
100 TABLET ORAL 2 TIMES DAILY
COMMUNITY

## 2024-07-19 RX ORDER — LEVETIRACETAM 100 MG/ML
1500 SOLUTION ORAL 2 TIMES DAILY
Status: DISCONTINUED | OUTPATIENT
Start: 2024-07-19 | End: 2024-07-19

## 2024-07-19 RX ORDER — LEVETIRACETAM 15 MG/ML
1500 INJECTION INTRAVASCULAR EVERY 12 HOURS
Status: DISCONTINUED | OUTPATIENT
Start: 2024-07-19 | End: 2024-07-26 | Stop reason: HOSPADM

## 2024-07-19 RX ORDER — LANOLIN ALCOHOL/MO/W.PET/CERES
500 CREAM (GRAM) TOPICAL 2 TIMES DAILY
COMMUNITY

## 2024-07-19 RX ORDER — BUSPIRONE HYDROCHLORIDE 5 MG/1
5 TABLET ORAL 3 TIMES DAILY
Status: DISCONTINUED | OUTPATIENT
Start: 2024-07-19 | End: 2024-07-26 | Stop reason: HOSPADM

## 2024-07-19 RX ORDER — DILTIAZEM HYDROCHLORIDE 60 MG/1
60 TABLET, FILM COATED ORAL EVERY 6 HOURS
Status: DISCONTINUED | OUTPATIENT
Start: 2024-07-19 | End: 2024-07-26 | Stop reason: HOSPADM

## 2024-07-19 RX ORDER — PANTOPRAZOLE SODIUM 40 MG/10ML
40 INJECTION, POWDER, LYOPHILIZED, FOR SOLUTION INTRAVENOUS EVERY 12 HOURS
Status: DISCONTINUED | OUTPATIENT
Start: 2024-07-19 | End: 2024-07-26 | Stop reason: HOSPADM

## 2024-07-19 RX ORDER — FINASTERIDE 5 MG/1
5 TABLET, FILM COATED ORAL DAILY
Status: DISCONTINUED | OUTPATIENT
Start: 2024-07-19 | End: 2024-07-26 | Stop reason: HOSPADM

## 2024-07-19 RX ORDER — MICONAZOLE NITRATE 2 %
POWDER (GRAM) TOPICAL 2 TIMES DAILY
Status: DISCONTINUED | OUTPATIENT
Start: 2024-07-19 | End: 2024-07-26 | Stop reason: HOSPADM

## 2024-07-19 RX ORDER — MULTIVITAMIN
1 TABLET ORAL DAILY
COMMUNITY

## 2024-07-19 RX ORDER — CHOLESTYRAMINE 4 G/9G
4 POWDER, FOR SUSPENSION ORAL DAILY
COMMUNITY

## 2024-07-19 RX ORDER — METOPROLOL TARTRATE 50 MG/1
100 TABLET ORAL 2 TIMES DAILY
Status: DISCONTINUED | OUTPATIENT
Start: 2024-07-19 | End: 2024-07-26 | Stop reason: HOSPADM

## 2024-07-19 RX ORDER — LOSARTAN POTASSIUM 50 MG/1
100 TABLET ORAL DAILY
Status: DISCONTINUED | OUTPATIENT
Start: 2024-07-19 | End: 2024-07-26 | Stop reason: HOSPADM

## 2024-07-19 RX ORDER — QUETIAPINE FUMARATE 25 MG/1
25 TABLET, FILM COATED ORAL 2 TIMES DAILY
Status: DISCONTINUED | OUTPATIENT
Start: 2024-07-19 | End: 2024-07-26 | Stop reason: HOSPADM

## 2024-07-19 RX ORDER — QUETIAPINE FUMARATE 25 MG/1
25 TABLET, FILM COATED ORAL 2 TIMES DAILY
COMMUNITY

## 2024-07-19 RX ORDER — CHOLESTYRAMINE 4 G/4.8G
4 POWDER, FOR SUSPENSION ORAL 2 TIMES DAILY
Status: DISCONTINUED | OUTPATIENT
Start: 2024-07-19 | End: 2024-07-26 | Stop reason: HOSPADM

## 2024-07-19 RX ADMIN — Medication: at 11:07

## 2024-07-19 RX ADMIN — QUETIAPINE FUMARATE 25 MG: 25 TABLET ORAL at 10:07

## 2024-07-19 RX ADMIN — MUPIROCIN: 20 OINTMENT TOPICAL at 11:07

## 2024-07-19 RX ADMIN — METOPROLOL TARTRATE 100 MG: 50 TABLET, FILM COATED ORAL at 08:07

## 2024-07-19 RX ADMIN — METOPROLOL TARTRATE 5 MG: 1 INJECTION, SOLUTION INTRAVENOUS at 09:07

## 2024-07-19 RX ADMIN — PANTOPRAZOLE SODIUM 40 MG: 40 INJECTION, POWDER, LYOPHILIZED, FOR SOLUTION INTRAVENOUS at 05:07

## 2024-07-19 RX ADMIN — DILTIAZEM HYDROCHLORIDE 60 MG: 60 TABLET, FILM COATED ORAL at 02:07

## 2024-07-19 RX ADMIN — BUSPIRONE HYDROCHLORIDE 5 MG: 5 TABLET ORAL at 08:07

## 2024-07-19 RX ADMIN — CEFTRIAXONE SODIUM 1 G: 1 INJECTION, POWDER, FOR SOLUTION INTRAMUSCULAR; INTRAVENOUS at 03:07

## 2024-07-19 RX ADMIN — PANTOPRAZOLE SODIUM 40 MG: 40 INJECTION, POWDER, LYOPHILIZED, FOR SOLUTION INTRAVENOUS at 10:07

## 2024-07-19 RX ADMIN — MICONAZOLE NITRATE 2 % TOPICAL POWDER: at 11:07

## 2024-07-19 RX ADMIN — SODIUM CHLORIDE SOLN NEBU 3% 4 ML: 3 NEBU SOLN at 07:07

## 2024-07-19 RX ADMIN — LEVETIRACETAM INJECTION 1500 MG: 15 INJECTION INTRAVENOUS at 08:07

## 2024-07-19 RX ADMIN — CHOLESTYRAMINE 4 G: 4 POWDER, FOR SUSPENSION ORAL at 08:07

## 2024-07-19 RX ADMIN — METOPROLOL TARTRATE 5 MG: 1 INJECTION, SOLUTION INTRAVENOUS at 08:07

## 2024-07-19 RX ADMIN — LEVETIRACETAM INJECTION 1500 MG: 15 INJECTION INTRAVENOUS at 01:07

## 2024-07-19 RX ADMIN — AZITHROMYCIN MONOHYDRATE 500 MG: 500 INJECTION, POWDER, LYOPHILIZED, FOR SOLUTION INTRAVENOUS at 05:07

## 2024-07-19 RX ADMIN — BACITRACIN ZINC, NEOMYCIN, POLYMYXIN B: 400; 3.5; 5 OINTMENT TOPICAL at 11:07

## 2024-07-19 RX ADMIN — DILTIAZEM HYDROCHLORIDE 10 MG: 5 INJECTION INTRAVENOUS at 10:07

## 2024-07-19 RX ADMIN — BUSPIRONE HYDROCHLORIDE 5 MG: 5 TABLET ORAL at 10:07

## 2024-07-19 RX ADMIN — BUSPIRONE HYDROCHLORIDE 5 MG: 5 TABLET ORAL at 03:07

## 2024-07-19 RX ADMIN — SODIUM CHLORIDE SOLN NEBU 3% 4 ML: 3 NEBU SOLN at 01:07

## 2024-07-19 RX ADMIN — CHOLESTYRAMINE 4 G: 4 POWDER, FOR SUSPENSION ORAL at 10:07

## 2024-07-19 RX ADMIN — LEVETIRACETAM INJECTION 1500 MG: 15 INJECTION INTRAVENOUS at 11:07

## 2024-07-19 RX ADMIN — METOPROLOL TARTRATE 100 MG: 50 TABLET, FILM COATED ORAL at 02:07

## 2024-07-19 RX ADMIN — DILTIAZEM HYDROCHLORIDE 60 MG: 60 TABLET, FILM COATED ORAL at 06:07

## 2024-07-19 RX ADMIN — FINASTERIDE 5 MG: 5 TABLET, FILM COATED ORAL at 08:07

## 2024-07-19 RX ADMIN — DILTIAZEM HYDROCHLORIDE 60 MG: 60 TABLET, FILM COATED ORAL at 01:07

## 2024-07-19 RX ADMIN — METOPROLOL TARTRATE 100 MG: 50 TABLET, FILM COATED ORAL at 10:07

## 2024-07-19 RX ADMIN — MUPIROCIN: 20 OINTMENT TOPICAL at 08:07

## 2024-07-19 RX ADMIN — LOSARTAN POTASSIUM 100 MG: 50 TABLET, FILM COATED ORAL at 08:07

## 2024-07-19 RX ADMIN — SODIUM CHLORIDE SOLN NEBU 3% 4 ML: 3 NEBU SOLN at 06:07

## 2024-07-19 RX ADMIN — QUETIAPINE FUMARATE 25 MG: 25 TABLET ORAL at 08:07

## 2024-07-19 NOTE — HPI
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      He presents to the ED today for vomiting dark emesis per nursing home staff while receiving wound care today. While in the ED patient's heart rate ranged from 110s to 140s.  EKG showed AFib RVR.  Chest x-ray showed stable cardiomegaly and no acute findings.  CT head showed chronic stable findings.  Labs without leukocytosis, H&H stable, UA with 2+ blood, 500 WBCs, few bacteria and 50-99 WBCs.  GCS was 11 on arrival, which appears to be his baseline.  He was given metoprolol minimal improvement in his heart rate is started on IV antibiotics for UTI and hospitalist service was consulted for admission.

## 2024-07-19 NOTE — HOSPITAL COURSE
7/19/24-No changes today.  His trach does have increased sputum production.  Culture is pending.  Will have nurse suction.

## 2024-07-19 NOTE — ASSESSMENT & PLAN NOTE
Chronic, controlled. Latest blood pressure and vitals reviewed-     Temp:  [99 °F (37.2 °C)-100.4 °F (38 °C)]   Pulse:  []   Resp:  [14-28]   BP: (111-153)/()   SpO2:  [90 %-100 %] .   Home meds for hypertension were reviewed and noted below.   Hypertension Medications               cloNIDine (CATAPRES) 0.1 MG tablet 0.1 mg by Per G Tube route every 8 (eight) hours as needed (Give for systolic >180).    diltiaZEM (CARDIZEM) 60 MG tablet 60 mg by Per G Tube route every 6 (six) hours.    furosemide (LASIX) 80 MG tablet 80 mg by Per G Tube route as needed (for Edema).    hydrALAZINE (APRESOLINE) 50 MG tablet 75 mg by Per G Tube route every 8 (eight) hours as needed (for elevated BP).    losartan (COZAAR) 100 MG tablet 100 mg by Per G Tube route once daily.    metoprolol tartrate (LOPRESSOR) 100 MG tablet 100 mg by Per G Tube route 2 (two) times daily.            While in the hospital, will manage blood pressure as follows; Continue home antihypertensive regimen    Will utilize p.r.n. blood pressure medication only if patient's blood pressure greater than 140/90 and he develops symptoms such as worsening chest pain or shortness of breath.

## 2024-07-19 NOTE — PLAN OF CARE
07/19/24 0944   Discharge Assessment   Assessment Type Discharge Planning Assessment   Source of Information health record   Communicated EDER with patient/caregiver Date not available/Unable to determine   Reason For Admission Hematemesis, Sepsis, UTI, Afib   People in Home facility resident   Facility Arrived From: [facility]   Do you expect to return to your current living situation? Yes   Discharge Plan A Return to nursing home   Discharge Plan B Return to Nursing Home   OTHER   Name(s) of People in Home Patient is resident at [facility]

## 2024-07-19 NOTE — NURSING
Subjective:      Patient ID: Delio Daniel Jr. is a 68 y.o. male.    Chief Complaint: Emesis (Sent from [facility] home. Vomited earlier this morning. NH states they wanted the pt to be evaluated for poss aspiration vs gi bleed. Per ems, no blood noted in pt's vomit on their arrival. GCS 11./.)    HPI  Review of Systems   Objective:     Physical Exam   Assessment:     1. Acute cystitis without hematuria    2. Aspiration of gastric contents    3. Tachycardia    4. Hematemesis, unspecified whether nausea present           Altered Skin Integrity 02/26/24 1100 lower Buttocks Moisture associated dermatitis (Active)   02/26/24 1100   Altered Skin Integrity Present on Admission - Did Patient arrive to the hospital with altered skin?: suspected hospital acquired   Side:    Orientation: lower   Location: Buttocks   Wound Number:    Is this injury device related?:    Primary Wound Type: Moisture ass   Description of Altered Skin Integrity:    Ankle-Brachial Index:    Pulses:    Removal Indication and Assessment:    Wound Outcome:    (Retired) Wound Length (cm):    (Retired) Wound Width (cm):    (Retired) Depth (cm):    Wound Description (Comments):    Removal Indications:    Wound Image    07/19/24 1000   Dressing Appearance No dressing 07/19/24 1000   Drainage Amount None 07/19/24 1000   Tissue loss description Not applicable 07/19/24 1000   Care Cleansed with:;Antimicrobial agent 07/19/24 1000   Dressing Absorptive Pad 07/19/24 1000            Wound 07/19/24 1000 Other (comment) Scrotum (Active)   07/19/24 1000   Present on Original Admission: Y   Primary Wound Type: Other   Side:    Orientation:    Location: Scrotum   Wound Approximate Age at First Assessment (Weeks):    Wound Number:    Is this injury device related?:    Incision Type:    Closure Method:    Wound Description (Comments):    Type:    Additional Comments:    Ankle-Brachial Index:    Pulses:    Removal Indication and Assessment:    Wound Outcome:     Wound Image   07/19/24 1000   Dressing Appearance Open to air 07/19/24 1000   Drainage Amount None 07/19/24 1000   Appearance Red;Dry 07/19/24 1000   Tissue loss description Not applicable 07/19/24 1000   Care Cleansed with:;Wound cleanser 07/19/24 1000            Wound 07/19/24 1000 Abrasion(s) Left anterior Knee (Active)   07/19/24 1000   Present on Original Admission: Y   Primary Wound Type: Abrasion(s)   Side: Left   Orientation: anterior   Location: Knee   Wound Approximate Age at First Assessment (Weeks):    Wound Number:    Is this injury device related?:    Incision Type:    Closure Method:    Wound Description (Comments):    Type:    Additional Comments:    Ankle-Brachial Index:    Pulses:    Removal Indication and Assessment:    Wound Outcome:    Wound Image   07/19/24 1000   Dressing Appearance Intact;Moist drainage 07/19/24 1000   Drainage Amount Scant 07/19/24 1000   Drainage Characteristics/Odor Serosanguineous;No odor 07/19/24 1000   Appearance Red;Moist 07/19/24 1000   Tissue loss description Partial thickness 07/19/24 1000   Red (%), Wound Tissue Color 100 % 07/19/24 1000   Periwound Area Dry;Intact 07/19/24 1000   Wound Edges Irregular 07/19/24 1000   Wound Length (cm) 0.8 cm 07/19/24 1000   Wound Width (cm) 1.2 cm 07/19/24 1000   Wound Surface Area (cm^2) 0.96 cm^2 07/19/24 1000   Care Cleansed with:;Antimicrobial agent 07/19/24 1000   Dressing Bandaid 07/19/24 1000            Wound 07/19/24 1000 Pressure Injury Left lateral Malleolus (Active)   07/19/24 1000   Present on Original Admission: Y   Primary Wound Type: Pressure inj   Side: Left   Orientation: lateral   Location: Malleolus   Wound Approximate Age at First Assessment (Weeks):    Wound Number:    Is this injury device related?:    Incision Type:    Closure Method:    Wound Description (Comments):    Type:    Additional Comments:    Ankle-Brachial Index:    Pulses:    Removal Indication and Assessment:    Wound Outcome:    Wound Image    07/19/24 1000   Pressure Injury Stage 2 07/19/24 1000   Dressing Appearance Intact;Moist drainage 07/19/24 1000   Drainage Amount Scant 07/19/24 1000   Drainage Characteristics/Odor Serosanguineous;No odor 07/19/24 1000   Appearance Red;Moist 07/19/24 1000   Tissue loss description Partial thickness 07/19/24 1000   Red (%), Wound Tissue Color 100 % 07/19/24 1000   Periwound Area Dry;Intact 07/19/24 1000   Wound Edges Irregular 07/19/24 1000   Wound Length (cm) 0.4 cm 07/19/24 1000   Wound Width (cm) 0.4 cm 07/19/24 1000   Wound Surface Area (cm^2) 0.16 cm^2 07/19/24 1000   Care Cleansed with:;Antimicrobial agent 07/19/24 1000   Dressing Bandaid 07/19/24 1000       Plan:          69 y/o male  past med hx of cva with permanent trach and peg in with aspiration.   Multiple comorbities see above.   He is nonverbal he is contracted and use of right hand only.   Total care. Skin issues chronic dermatitis to inner thighs groins and buttock-area completely discolored.  We need to dry him up.   Assessment with nurse August.  Care put inplace.  -Low airloss support ordered for aeration.  He is to be turned q2hr with wedge and offloading boots per staffing per shift.  Will visit weekly while in hospital.

## 2024-07-19 NOTE — PROGRESS NOTES
[facility] - 4th Floor [facility] Medicine  Progress Note    Patient Name: Delio Daniel Jr.  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      He presents to the ED today for vomiting dark emesis per nursing home staff while receiving wound care today. While in the ED patient's heart rate ranged from 110s to 140s.  EKG showed AFib RVR.  Chest x-ray showed stable cardiomegaly and no acute findings.  CT head showed chronic stable findings.  Labs without leukocytosis, H&H stable, UA with 2+ blood, 500 WBCs, few bacteria and 50-99 WBCs.  GCS was 11 on arrival, which appears to be his baseline.  He was given metoprolol minimal improvement in his heart rate is started on IV antibiotics for UTI and hospitalist service was consulted for admission.       Overview/Hospital Course:  7/19/24-No changes today.  His trach does have increased sputum production.  Culture is pending.  Will have nurse suction.      Interval History:     Review of Systems   Unable to perform ROS: Acuity of condition     Objective:     Vital Signs (Most Recent):  Temp: 99 °F (37.2 °C) (07/19/24 0921)  Pulse: 107 (07/19/24 1330)  Resp: (!) 24 (07/19/24 1330)  BP: 119/80 (07/19/24 0921)  SpO2: 100 %  (07/19/24 1330) Vital Signs (24h Range):  Temp:  [99 °F (37.2 °C)-100.4 °F (38 °C)] 99 °F (37.2 °C)  Pulse:  [] 107  Resp:  [14-28] 24  SpO2:  [90 %-100 %] 100 %  BP: (111-153)/() 119/80     Weight: 117.9 kg (260 lb)  Body mass index is 37.31 kg/m².    Intake/Output Summary (Last 24 hours) at 7/19/2024 1438  Last data filed at 7/19/2024 0148  Gross per 24 hour   Intake 600 ml   Output 750 ml   Net -150 ml         Physical Exam  Constitutional:       Appearance: He is normal weight.   HENT:      Head: Atraumatic.      Nose: Nose normal.      Mouth/Throat:      Mouth: Mucous membranes are moist.      Pharynx: Oropharynx is clear.   Eyes:      Pupils: Pupils are equal, round, and reactive to light.   Cardiovascular:      Rate and Rhythm: Normal rate and regular rhythm.      Pulses: Normal pulses.      Heart sounds: Normal heart sounds.   Pulmonary:      Effort: Pulmonary effort is normal.      Breath sounds: Normal breath sounds.      Comments: trach  Abdominal:      General: Bowel sounds are normal.      Palpations: Abdomen is soft.      Comments: PEG   Musculoskeletal:         General: Normal range of motion.      Cervical back: Normal range of motion and neck supple.      Comments: contractures   Skin:     General: Skin is warm.      Capillary Refill: Capillary refill takes 2 to 3 seconds.   Neurological:      Mental Status: He is easily aroused. Mental status is at baseline. He is lethargic and disoriented.      Motor: Weakness present.             Significant Labs: All pertinent labs within the past 24 hours have been reviewed.  BMP:   Recent Labs   Lab 07/19/24 0315      K 3.8      CO2 22*   BUN 13.6   CREATININE 0.74   CALCIUM 8.6*   MG 2.00     CBC:   Recent Labs   Lab 07/18/24  1458 07/19/24 0315   WBC 7.91 9.83   HGB 12.3* 11.1*   HCT 39.0* 34.9*    305     CMP:   Recent Labs   Lab 07/18/24  1458 07/19/24 0315    140   K 3.7 3.8    107   CO2 30 22*   BUN 16.7 13.6    CREATININE 0.80 0.74   CALCIUM 9.4 8.6*   ALBUMIN 3.5 3.2*   BILITOT 0.4 0.5   ALKPHOS 170* 154*   AST 23 28   ALT 26 23     Magnesium:   Recent Labs   Lab 07/19/24  0315   MG 2.00       Significant Imaging: I have reviewed all pertinent imaging results/findings within the past 24 hours.    Assessment/Plan:      * Aspiration of gastric contents  Aspiration precautions  Restart TF's but make sure patient is at least 30 degrees  Follow labs      Hypertension  Chronic, controlled. Latest blood pressure and vitals reviewed-     Temp:  [99 °F (37.2 °C)-100.4 °F (38 °C)]   Pulse:  []   Resp:  [14-28]   BP: (111-153)/()   SpO2:  [90 %-100 %] .   Home meds for hypertension were reviewed and noted below.   Hypertension Medications               cloNIDine (CATAPRES) 0.1 MG tablet 0.1 mg by Per G Tube route every 8 (eight) hours as needed (Give for systolic >180).    diltiaZEM (CARDIZEM) 60 MG tablet 60 mg by Per G Tube route every 6 (six) hours.    furosemide (LASIX) 80 MG tablet 80 mg by Per G Tube route as needed (for Edema).    hydrALAZINE (APRESOLINE) 50 MG tablet 75 mg by Per G Tube route every 8 (eight) hours as needed (for elevated BP).    losartan (COZAAR) 100 MG tablet 100 mg by Per G Tube route once daily.    metoprolol tartrate (LOPRESSOR) 100 MG tablet 100 mg by Per G Tube route 2 (two) times daily.            While in the hospital, will manage blood pressure as follows; Continue home antihypertensive regimen    Will utilize p.r.n. blood pressure medication only if patient's blood pressure greater than 140/90 and he develops symptoms such as worsening chest pain or shortness of breath.      VTE Risk Mitigation (From admission, onward)      None          Follow labs  Cultures pending  DVT prophylaxis  Resume current meds  Discharge Planning   EDER:      Code Status: Prior   Is the patient medically ready for discharge?:     Reason for patient still in hospital (select all that apply): Patient trending  condition, Laboratory test, Treatment, and Consult recommendations  Discharge Plan A: Return to nursing home                  Sekou Ybarra MD  Department of Hospital Medicine   [facility] - 4th Floor Medical Telemetry

## 2024-07-19 NOTE — SUBJECTIVE & OBJECTIVE
Interval History:     Review of Systems   Unable to perform ROS: Acuity of condition     Objective:     Vital Signs (Most Recent):  Temp: 99 °F (37.2 °C) (07/19/24 0921)  Pulse: 107 (07/19/24 1330)  Resp: (!) 24 (07/19/24 1330)  BP: 119/80 (07/19/24 0921)  SpO2: 100 % (07/19/24 1330) Vital Signs (24h Range):  Temp:  [99 °F (37.2 °C)-100.4 °F (38 °C)] 99 °F (37.2 °C)  Pulse:  [] 107  Resp:  [14-28] 24  SpO2:  [90 %-100 %] 100 %  BP: (111-153)/() 119/80     Weight: 117.9 kg (260 lb)  Body mass index is 37.31 kg/m².    Intake/Output Summary (Last 24 hours) at 7/19/2024 1438  Last data filed at 7/19/2024 0148  Gross per 24 hour   Intake 600 ml   Output 750 ml   Net -150 ml         Physical Exam  Constitutional:       Appearance: He is normal weight.   HENT:      Head: Atraumatic.      Nose: Nose normal.      Mouth/Throat:      Mouth: Mucous membranes are moist.      Pharynx: Oropharynx is clear.   Eyes:      Pupils: Pupils are equal, round, and reactive to light.   Cardiovascular:      Rate and Rhythm: Normal rate and regular rhythm.      Pulses: Normal pulses.      Heart sounds: Normal heart sounds.   Pulmonary:      Effort: Pulmonary effort is normal.      Breath sounds: Normal breath sounds.      Comments: trach  Abdominal:      General: Bowel sounds are normal.      Palpations: Abdomen is soft.      Comments: PEG   Musculoskeletal:         General: Normal range of motion.      Cervical back: Normal range of motion and neck supple.      Comments: contractures   Skin:     General: Skin is warm.      Capillary Refill: Capillary refill takes 2 to 3 seconds.   Neurological:      Mental Status: He is easily aroused. Mental status is at baseline. He is lethargic and disoriented.      Motor: Weakness present.             Significant Labs: All pertinent labs within the past 24 hours have been reviewed.  BMP:   Recent Labs   Lab 07/19/24  0315      K 3.8      CO2 22*   BUN 13.6   CREATININE 0.74    CALCIUM 8.6*   MG 2.00     CBC:   Recent Labs   Lab 07/18/24  1458 07/19/24  0315   WBC 7.91 9.83   HGB 12.3* 11.1*   HCT 39.0* 34.9*    305     CMP:   Recent Labs   Lab 07/18/24  1458 07/19/24  0315    140   K 3.7 3.8    107   CO2 30 22*   BUN 16.7 13.6   CREATININE 0.80 0.74   CALCIUM 9.4 8.6*   ALBUMIN 3.5 3.2*   BILITOT 0.4 0.5   ALKPHOS 170* 154*   AST 23 28   ALT 26 23     Magnesium:   Recent Labs   Lab 07/19/24  0315   MG 2.00       Significant Imaging: I have reviewed all pertinent imaging results/findings within the past 24 hours.

## 2024-07-19 NOTE — CONSULTS
Inpatient Nutrition Assessment    Admit Date: 7/18/2024   Total duration of encounter: 1 day   Patient Age: 68 y.o.    Nutrition Recommendation/Prescription     Tube feeding as tolerated, start at 20 ml/hr, advance by 20 ml/hr every 4 hours as tolerated:  Impact Peptide 1.5 goal rate 80 ml/hr  Patel BID  to provide (calculations based on estimated 20 hr/d run time)   2580 kcal, 100% needs  155 g protein, 100% needs  230 g carbohydrate, 100% needs  1232 ml free water, 50% needs, will require additional fluid source, can be given as 60 ml/hr water flush when IV fluid stopped    Communication of Recommendations: reviewed with provider and reviewed with nurse    Nutrition Assessment     Malnutrition Assessment/Nutrition-Focused Physical Exam    Malnutrition Context: acute illness or injury (07/19/24 1243)  Malnutrition Level:  (does not meet criteria) (07/19/24 1243)  Energy Intake (Malnutrition):  (unable to obtain) (07/19/24 1243)  Weight Loss (Malnutrition):  (none per malnutrition screen or weight history, unable to verify) (07/19/24 1243)  Subcutaneous Fat (Malnutrition):  (none noted) (07/19/24 1243)           Muscle Mass (Malnutrition):  (none noted) (07/19/24 1243)                                   A minimum of two characteristics is recommended for diagnosis of either severe or non-severe malnutrition.    Chart Review    Reason Seen: physician consult for tube feeding    Malnutrition Screening Tool Results   Have you recently lost weight without trying?: No  Have you been eating poorly because of a decreased appetite?: No   MST Score: 0   Diagnosis:  Sepsis, secondary to bronchitis and possible UTI  Afib, RVR  Acute on chronic respiratory alkalosis  Stable ventriculomegaly/hydrocephalus awaiting high volume LP on 7/24/24  MCA CVA with hemorrhagic conversion status post right frontotemporal decompressive hemicraniectomy in November of 2023 with residual aphasia and left hemiplegia  Seizure  disorder  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  Essential HTN  Hyperlipidemia  CAD  Hx Enterobacter cloacea UTI  Hx Vfib Arrest s/p AICD placement  Aphasia status post tracheostomy on 12/20/2023 and PEG tube placement on 01/02/2024    Relevant Medical History: HTN, Afib, on Xarelto, Hx Vfib arrest s/p AICD in 2018, neuroendocrine carcinoma of the small bowel s/p bowel resection in 2018, MCA CVA s/p right ICA thrombectomy with hemorrhagic conversion right frontotemporal decompressive hemicraniectomy on 12/20/23 with residual aphasia and spastic left hemiplegia, seizure disorder, trache/peg dependent, 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 with plans to undergo high volume LP on 7/24/24 to determine if  shunt is warranted     Scheduled Medications:  azithromycin, 500 mg, Q24H  busPIRone, 5 mg, TID  cefTRIAXone (Rocephin) IV (PEDS and ADULTS), 1 g, Q24H  cholestyramine-aspartame, 4 g, BID  diltiaZEM, 60 mg, Q6H  finasteride, 5 mg, Daily  lactated ringers, 500 mL, Once  levETIRAcetam (Keppra) IV (PEDS and ADULTS), 1,500 mg, Q12H  losartan, 100 mg, Daily  metoprolol tartrate, 100 mg, BID  miconazole NITRATE 2 %, , BID  mupirocin, , BID  neomycin-bacitracin-polymyxin, , BID  pantoprazole, 40 mg, Q12H  QUEtiapine, 25 mg, BID  sodium chloride 3%, 4 mL, Q6H  zinc oxide-cod liver oil, , BID    Continuous Infusions:  lactated ringers, Last Rate: 100 mL/hr at 07/18/24 2247    PRN Medications:   acetaminophen, 1,000 mg, Q6H PRN  albuterol-ipratropium, 3 mL, Q6H PRN  cloNIDine, 0.1 mg, Q8H PRN  diltiaZEM, 10 mg, Q1H PRN  metoprolol, 5 mg, Q5 Min PRN    Calorie Containing IV Medications: no significant kcals from medications at this time    Recent Labs   Lab 07/18/24  1458 07/19/24  0315    140   K 3.7 3.8   CALCIUM 9.4 8.6*   PHOS  --  3.6   MG  --  2.00   CO2 30 22*   BUN 16.7 13.6   CREATININE 0.80 0.74   EGFRNORACEVR >60 >60   GLUCOSE 94 97   BILITOT 0.4 0.5   ALKPHOS 170* 154*   ALT 26  "23   AST 23 28   ALBUMIN 3.5 3.2*   WBC 7.91 9.83   HGB 12.3* 11.1*   HCT 39.0* 34.9*     Nutrition Orders:  No diet orders on file    Appetite/Oral Intake: NPO/not applicable  Factors Affecting Nutritional Intake: difficulty/impaired swallowing, NPO, and tracheostomy  Social Needs Impacting Access to Food: none identified  Food/Congregational/Cultural Preferences: unable to obtain  Food Allergies: no known food allergies  Last Bowel Movement: 07/18/24  Wound(s): nurse reports multiple wounds, no current wounds documented (new admit)    Comments    7/19/24 Patient unable to provide information, discussed with physician, ok to start tube feeding, will provide orders. He is on Vital 1.5 at nursing home per notes, will order Impact Peptide (substitute with Pivot 1.5 as needed due to formula shortage); also add Patel for wound healing.     Anthropometrics    Height: 5' 10" (177.8 cm), Height Method: Stated  Last Weight: 117.9 kg (260 lb) (07/18/24 1408), Weight Method: Stated  BMI (Calculated): 37.3  BMI Classification: obese grade II (BMI 35-39.9)        Ideal Body Weight (IBW), Male: 166 lb     % Ideal Body Weight, Male (lb): 156.63 %                          Usual Weight Provided By: unable to obtain usual weight    Wt Readings from Last 5 Encounters:   07/18/24 117.9 kg (260 lb)   06/25/24 90.7 kg (200 lb)   05/15/24 90.7 kg (200 lb)   02/25/24 95.6 kg (210 lb 12.2 oz)   01/20/24 117.9 kg (260 lb)     Weight Change(s) Since Admission:   (7/19) admission weight (117.9 kg) documented as stated, unable to confirm on bed scale during rounds (bed would not weigh in current position and nurse reports respiratory decline when lays flat), documented weight appears like it may be higher than patient actually weighs but unable to confirm  Wt Readings from Last 1 Encounters:   07/18/24 1408 117.9 kg (260 lb)   Admit Weight: 117.9 kg (260 lb) (07/18/24 1408), Weight Method: Stated    Estimated Needs    Weight Used For Calorie " Calculations: 108.9 kg (240 lb)  Energy Calorie Requirements (kcal): 5746-1621, 1.2-1.4 stress factor  Energy Need Method: Stephenson-St Jeor  Weight Used For Protein Calculations: 108.9 kg (240 lb)  Protein Requirements: 142-164 g, 1.3-1.5 g/kg  Fluid Requirements (mL): 0762-2896, 1 ml/kcal or per physician  CHO Requirement: 224-393 g, 40-60% of kcal     Enteral Nutrition Patient not receiving enteral nutrition at this time.    Parenteral Nutrition Patient not receiving parenteral nutrition support at this time.    Evaluation of Received Nutrient Intake    Calories: not meeting estimated needs  Protein: not meeting estimated needs    Patient Education Not applicable.    Nutrition Diagnosis     PES: Inadequate energy intake related to inability to consume sufficient nutrients as evidenced by less than 80% needs met. (new)    Nutrition Interventions     Intervention(s): modified composition of enteral nutrition, modified rate of enteral nutrition, and collaboration with other providers  Goal: Meet greater than 80% of nutritional needs by follow-up. (new)  Goal: Tolerate enteral feeding at goal rate by follow-up. (new)    Nutrition Goals & Monitoring     Dietitian will monitor: energy intake, enteral nutrition intake, weight, weight change, beliefs/attitudes, glucose/endocrine profile, and gastrointestinal profile  Discharge planning: tube feeding (as recommended/ordered)  Nutrition Risk/Follow-Up: moderate (follow-up in 3-5 days)   Please consult if re-assessment needed sooner.

## 2024-07-19 NOTE — NURSING
Nurses Note -- 4 Eyes      7/19/2024   4:37 PM      Skin assessed during: Admit      [] No Altered Skin Integrity Present    []Prevention Measures Documented      [x] Yes- Altered Skin Integrity Present or Discovered   [x] LDA Added if Not in Epic (Describe Wound)   [x] New Altered Skin Integrity was Present on Admit and Documented in LDA   [x] Wound Image Taken    Wound Care Consulted? Yes    Attending Nurse:  Mata Johnson RN/Staff Member:   Huey

## 2024-07-20 LAB
ALBUMIN SERPL-MCNC: 3.6 G/DL (ref 3.4–4.8)
ALBUMIN/GLOB SERPL: 1 RATIO (ref 1.1–2)
ALP SERPL-CCNC: 174 UNIT/L (ref 40–150)
ALT SERPL-CCNC: 23 UNIT/L (ref 0–55)
ANION GAP SERPL CALC-SCNC: 13 MEQ/L
AST SERPL-CCNC: 23 UNIT/L (ref 5–34)
BASOPHILS # BLD AUTO: 0.07 X10(3)/MCL
BASOPHILS NFR BLD AUTO: 0.7 %
BILIRUB SERPL-MCNC: 0.7 MG/DL
BUN SERPL-MCNC: 9.9 MG/DL (ref 8.4–25.7)
CALCIUM SERPL-MCNC: 9.1 MG/DL (ref 8.8–10)
CHLORIDE SERPL-SCNC: 110 MMOL/L (ref 98–107)
CO2 SERPL-SCNC: 19 MMOL/L (ref 23–31)
CREAT SERPL-MCNC: 0.72 MG/DL (ref 0.73–1.18)
CREAT/UREA NIT SERPL: 14
EOSINOPHIL # BLD AUTO: 0.06 X10(3)/MCL (ref 0–0.9)
EOSINOPHIL NFR BLD AUTO: 0.6 %
ERYTHROCYTE [DISTWIDTH] IN BLOOD BY AUTOMATED COUNT: 15.8 % (ref 11.5–17)
GFR SERPLBLD CREATININE-BSD FMLA CKD-EPI: >60 ML/MIN/1.73/M2
GLOBULIN SER-MCNC: 3.6 GM/DL (ref 2.4–3.5)
GLUCOSE SERPL-MCNC: 113 MG/DL (ref 82–115)
HCT VFR BLD AUTO: 38 % (ref 42–52)
HGB BLD-MCNC: 12.4 G/DL (ref 14–18)
IMM GRANULOCYTES # BLD AUTO: 0.02 X10(3)/MCL (ref 0–0.04)
IMM GRANULOCYTES NFR BLD AUTO: 0.2 %
LYMPHOCYTES # BLD AUTO: 2.23 X10(3)/MCL (ref 0.6–4.6)
LYMPHOCYTES NFR BLD AUTO: 23.1 %
MAGNESIUM SERPL-MCNC: 2.2 MG/DL (ref 1.6–2.6)
MCH RBC QN AUTO: 26.1 PG (ref 27–31)
MCHC RBC AUTO-ENTMCNC: 32.6 G/DL (ref 33–36)
MCV RBC AUTO: 79.8 FL (ref 80–94)
MONOCYTES # BLD AUTO: 0.89 X10(3)/MCL (ref 0.1–1.3)
MONOCYTES NFR BLD AUTO: 9.2 %
NEUTROPHILS # BLD AUTO: 6.38 X10(3)/MCL (ref 2.1–9.2)
NEUTROPHILS NFR BLD AUTO: 66.2 %
NRBC BLD AUTO-RTO: 0 %
PLATELET # BLD AUTO: 320 X10(3)/MCL (ref 130–400)
PMV BLD AUTO: 10.3 FL (ref 7.4–10.4)
POTASSIUM SERPL-SCNC: 3.2 MMOL/L (ref 3.5–5.1)
PROT SERPL-MCNC: 7.2 GM/DL (ref 5.8–7.6)
RBC # BLD AUTO: 4.76 X10(6)/MCL (ref 4.7–6.1)
SODIUM SERPL-SCNC: 142 MMOL/L (ref 136–145)
WBC # BLD AUTO: 9.65 X10(3)/MCL (ref 4.5–11.5)

## 2024-07-20 PROCEDURE — 63600175 PHARM REV CODE 636 W HCPCS: Performed by: INTERNAL MEDICINE

## 2024-07-20 PROCEDURE — 94761 N-INVAS EAR/PLS OXIMETRY MLT: CPT

## 2024-07-20 PROCEDURE — 99900026 HC AIRWAY MAINTENANCE (STAT)

## 2024-07-20 PROCEDURE — 99900022

## 2024-07-20 PROCEDURE — 94640 AIRWAY INHALATION TREATMENT: CPT

## 2024-07-20 PROCEDURE — 83735 ASSAY OF MAGNESIUM: CPT | Performed by: INTERNAL MEDICINE

## 2024-07-20 PROCEDURE — 99900031 HC PATIENT EDUCATION (STAT)

## 2024-07-20 PROCEDURE — 27000221 HC OXYGEN, UP TO 24 HOURS

## 2024-07-20 PROCEDURE — 99900035 HC TECH TIME PER 15 MIN (STAT)

## 2024-07-20 PROCEDURE — 25000003 PHARM REV CODE 250: Performed by: INTERNAL MEDICINE

## 2024-07-20 PROCEDURE — 27200966 HC CLOSED SUCTION SYSTEM

## 2024-07-20 PROCEDURE — 94760 N-INVAS EAR/PLS OXIMETRY 1: CPT

## 2024-07-20 PROCEDURE — 85025 COMPLETE CBC W/AUTO DIFF WBC: CPT | Performed by: INTERNAL MEDICINE

## 2024-07-20 PROCEDURE — 63600175 PHARM REV CODE 636 W HCPCS: Performed by: NURSE PRACTITIONER

## 2024-07-20 PROCEDURE — 36415 COLL VENOUS BLD VENIPUNCTURE: CPT | Performed by: INTERNAL MEDICINE

## 2024-07-20 PROCEDURE — 25000242 PHARM REV CODE 250 ALT 637 W/ HCPCS: Performed by: INTERNAL MEDICINE

## 2024-07-20 PROCEDURE — 21400001 HC TELEMETRY ROOM

## 2024-07-20 PROCEDURE — 80053 COMPREHEN METABOLIC PANEL: CPT | Performed by: INTERNAL MEDICINE

## 2024-07-20 RX ORDER — METOPROLOL TARTRATE 1 MG/ML
5 INJECTION, SOLUTION INTRAVENOUS EVERY 6 HOURS PRN
Status: DISCONTINUED | OUTPATIENT
Start: 2024-07-20 | End: 2024-07-26 | Stop reason: HOSPADM

## 2024-07-20 RX ORDER — SODIUM CHLORIDE FOR INHALATION 3 %
4 VIAL, NEBULIZER (ML) INHALATION EVERY 6 HOURS PRN
Status: DISCONTINUED | OUTPATIENT
Start: 2024-07-20 | End: 2024-07-26 | Stop reason: HOSPADM

## 2024-07-20 RX ADMIN — SODIUM CHLORIDE SOLN NEBU 3% 4 ML: 3 NEBU SOLN at 12:07

## 2024-07-20 RX ADMIN — BUSPIRONE HYDROCHLORIDE 5 MG: 5 TABLET ORAL at 09:07

## 2024-07-20 RX ADMIN — RIVAROXABAN 20 MG: 10 TABLET, FILM COATED ORAL at 03:07

## 2024-07-20 RX ADMIN — AZITHROMYCIN MONOHYDRATE 500 MG: 500 INJECTION, POWDER, LYOPHILIZED, FOR SOLUTION INTRAVENOUS at 05:07

## 2024-07-20 RX ADMIN — DILTIAZEM HYDROCHLORIDE 60 MG: 60 TABLET, FILM COATED ORAL at 12:07

## 2024-07-20 RX ADMIN — PANTOPRAZOLE SODIUM 40 MG: 40 INJECTION, POWDER, LYOPHILIZED, FOR SOLUTION INTRAVENOUS at 09:07

## 2024-07-20 RX ADMIN — POTASSIUM BICARBONATE 50 MEQ: 977.5 TABLET, EFFERVESCENT ORAL at 09:07

## 2024-07-20 RX ADMIN — CHOLESTYRAMINE 4 G: 4 POWDER, FOR SUSPENSION ORAL at 09:07

## 2024-07-20 RX ADMIN — QUETIAPINE FUMARATE 25 MG: 25 TABLET ORAL at 10:07

## 2024-07-20 RX ADMIN — PANTOPRAZOLE SODIUM 40 MG: 40 INJECTION, POWDER, LYOPHILIZED, FOR SOLUTION INTRAVENOUS at 11:07

## 2024-07-20 RX ADMIN — BACITRACIN ZINC, NEOMYCIN, POLYMYXIN B: 400; 3.5; 5 OINTMENT TOPICAL at 10:07

## 2024-07-20 RX ADMIN — MUPIROCIN: 20 OINTMENT TOPICAL at 09:07

## 2024-07-20 RX ADMIN — Medication 1 EACH: at 03:07

## 2024-07-20 RX ADMIN — BUSPIRONE HYDROCHLORIDE 5 MG: 5 TABLET ORAL at 10:07

## 2024-07-20 RX ADMIN — Medication: at 09:07

## 2024-07-20 RX ADMIN — LOSARTAN POTASSIUM 100 MG: 50 TABLET, FILM COATED ORAL at 09:07

## 2024-07-20 RX ADMIN — SODIUM CHLORIDE, POTASSIUM CHLORIDE, SODIUM LACTATE AND CALCIUM CHLORIDE: 600; 310; 30; 20 INJECTION, SOLUTION INTRAVENOUS at 03:07

## 2024-07-20 RX ADMIN — FINASTERIDE 5 MG: 5 TABLET, FILM COATED ORAL at 09:07

## 2024-07-20 RX ADMIN — QUETIAPINE FUMARATE 25 MG: 25 TABLET ORAL at 09:07

## 2024-07-20 RX ADMIN — CEFEPIME 1 G: 1 INJECTION, POWDER, FOR SOLUTION INTRAMUSCULAR; INTRAVENOUS at 07:07

## 2024-07-20 RX ADMIN — SODIUM CHLORIDE SOLN NEBU 3% 4 ML: 3 NEBU SOLN at 07:07

## 2024-07-20 RX ADMIN — POTASSIUM BICARBONATE 50 MEQ: 977.5 TABLET, EFFERVESCENT ORAL at 03:07

## 2024-07-20 RX ADMIN — CEFEPIME 1 G: 1 INJECTION, POWDER, FOR SOLUTION INTRAMUSCULAR; INTRAVENOUS at 11:07

## 2024-07-20 RX ADMIN — DILTIAZEM HYDROCHLORIDE 60 MG: 60 TABLET, FILM COATED ORAL at 07:07

## 2024-07-20 RX ADMIN — MICONAZOLE NITRATE 2 % TOPICAL POWDER: at 10:07

## 2024-07-20 RX ADMIN — MICONAZOLE NITRATE 2 % TOPICAL POWDER: at 09:07

## 2024-07-20 RX ADMIN — MUPIROCIN: 20 OINTMENT TOPICAL at 10:07

## 2024-07-20 RX ADMIN — Medication: at 10:07

## 2024-07-20 RX ADMIN — LEVETIRACETAM INJECTION 1500 MG: 15 INJECTION INTRAVENOUS at 09:07

## 2024-07-20 RX ADMIN — DILTIAZEM HYDROCHLORIDE 60 MG: 60 TABLET, FILM COATED ORAL at 11:07

## 2024-07-20 RX ADMIN — METOPROLOL TARTRATE 100 MG: 50 TABLET, FILM COATED ORAL at 09:07

## 2024-07-20 RX ADMIN — CHOLESTYRAMINE 4 G: 4 POWDER, FOR SUSPENSION ORAL at 10:07

## 2024-07-20 RX ADMIN — LEVETIRACETAM INJECTION 1500 MG: 15 INJECTION INTRAVENOUS at 10:07

## 2024-07-20 RX ADMIN — BUSPIRONE HYDROCHLORIDE 5 MG: 5 TABLET ORAL at 03:07

## 2024-07-20 RX ADMIN — BACITRACIN ZINC, NEOMYCIN, POLYMYXIN B: 400; 3.5; 5 OINTMENT TOPICAL at 09:07

## 2024-07-20 RX ADMIN — DILTIAZEM HYDROCHLORIDE 60 MG: 60 TABLET, FILM COATED ORAL at 05:07

## 2024-07-20 RX ADMIN — METOPROLOL TARTRATE 100 MG: 50 TABLET, FILM COATED ORAL at 10:07

## 2024-07-20 NOTE — NURSING
Nurses Note -- 4 Eyes      7/19/24 1945      Skin assessed during: Q Shift Change      [] No Altered Skin Integrity Present    []Prevention Measures Documented      [x] Yes- Altered Skin Integrity Present or Discovered   [x] LDA Added if Not in Epic (Describe Wound)   [x] New Altered Skin Integrity was Present on Admit and Documented in LDA   [x] Wound Image Taken    Wound Care Consulted? Yes    Attending Nurse:  Leanne Johnson RN/Staff Member:  Mata

## 2024-07-20 NOTE — PROGRESS NOTES
[facility] Hospital Medicine Progress Note        Chief Complaint: Inpatient Follow-up    HPI:     68-year-old  male with significant history of PAF on Xarelto, VFib arrest status post AICD in 2018, MCA CVA with hemorrhagic conversion requiring decompressive hemicraniotomy in November of 2023 with residual aphasia, hemiplegia status post tracheostomy, peg tube placement.  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  Patient is a nursing home resident.  High-volume LP scheduled as outpatient on 07/24/2024 by Dr. Thomas to see if  shunt is warranted.  Patient was brought to the ED on 07/18 from the nursing home for questionable dark emesis.  Noted to be in AFib RVR.  CT head with chronic findings.  Lab significant for leukocytosis, bacteriuria.  Mentation at baseline.  Admitted to hospital medicine services for sepsis secondary to UTI versus tracheobronchitis, urine and respiratory cultures collected.  Concern for new onset seizures for which Keppra initiated, seizure precautions    Interval Hx:   Patient seen at bedside, he is awake and alert, still having low-grade temp spikes, in AFib with heart rate in 110s at the time of my rounds    Objective/physical exam:  General: In no acute distress, afebrile  Chest: Clear to auscultation bilaterally  Heart: S1, S2, no appreciable murmur  Abdomen: Soft, nontender, BS +  MSK: Warm, no lower extremity edema, no clubbing or cyanosis  Neurologic:  Awake, alert    VITAL SIGNS: 24 HRS MIN & MAX LAST   Temp  Min: 98 °F (36.7 °C)  Max: 100.2 °F (37.9 °C) 98.1 °F (36.7 °C)   BP  Min: 119/80  Max: 182/88 137/87   Pulse  Min: 72  Max: 121  110   Resp  Min: 16  Max: 24 18   SpO2  Min: 91 %  Max: 100 % 100 %       Recent Labs   Lab 07/20/24  0355   WBC 9.65   RBC 4.76   HGB 12.4*   HCT 38.0*   MCV 79.8*   MCH 26.1*   MCHC 32.6*   RDW 15.8       MPV 10.3         Recent Labs   Lab 07/18/24  2115 07/19/24  0315 07/20/24  0355   NA  --    < > 142   K  --    < > 3.2*   CL  --    < > 110*   CO2  --    < > 19*   BUN  --    < > 9.9   CREATININE  --    < > 0.72*   CALCIUM  --    < > 9.1   PH 7.680*  --   --    MG  --    < > 2.20   ALBUMIN  --    < > 3.6   ALKPHOS  --    < > 174*   ALT  --    < > 23   AST  --    < > 23   BILITOT  --    < > 0.7    < > = values in this interval not displayed.          Microbiology Results (last 7 days)       Procedure Component Value Units Date/Time    Respiratory Culture [[id number]]  (Abnormal) Collected: 07/19/24 0258    Order Status: Completed Specimen: Sputum, Expectorated Updated: 07/20/24 0710     Respiratory Culture Many Gram-negative Rods     Comment: with normal respiratory nyiah        GRAM STAIN Quality 3+      Moderate Gram negative diplococci      Many Gram Negative Rods      Moderate Gram Positive Rods      Few Gram positive cocci    Blood Culture #1 **CANNOT BE ORDERED STAT** [[id number]]  (Normal) Collected: 07/18/24 1812    Order Status: Completed Specimen: Blood Updated: 07/19/24 2100     Blood Culture No Growth At 24 Hours    Blood Culture #2 **CANNOT BE ORDERED STAT** [[id number]]  (Normal) Collected: 07/18/24 1812    Order Status: Completed Specimen: Blood Updated: 07/19/24 2100     Blood Culture No Growth At 24 Hours    Urine culture [[id number]] Collected: 07/18/24 1648    Order Status: Completed Specimen: Urine Updated: 07/19/24 0625     Urine Culture No Growth At 24 Hours             Scheduled Med:   azithromycin  500 mg Intravenous Q24H    busPIRone  5 mg Per G Tube TID    ceFEPime IV (PEDS and ADULTS)  1 g Intravenous Q8H    cholestyramine-aspartame  4 g Per G Tube BID    diltiaZEM  60 mg Per G Tube Q6H    finasteride  5 mg Per G Tube Daily    lactated ringers  500 mL Intravenous Once    levETIRAcetam (Keppra) IV (PEDS and ADULTS)  1,500 mg Intravenous Q12H    losartan  100 mg Per G Tube Daily     metoprolol tartrate  100 mg Per G Tube BID    miconazole NITRATE 2 %   Topical (Top) BID    mupirocin   Nasal BID    neomycin-bacitracin-polymyxin   Topical (Top) BID    pantoprazole  40 mg Intravenous Q12H    potassium bicarbonate  50 mEq Per G Tube Once    QUEtiapine  25 mg Per G Tube BID    sodium chloride 3%  4 mL Nebulization Q6H    zinc oxide-cod liver oil   Topical (Top) BID          Assessment/Plan:    Acute tracheobronchitis secondary to GNR   Sepsis secondary to above  Bacteriuria-likely chronic colonization  New onset seizure disorder  PAF with intermittent RVR  Hypokalemia  History of VFib arrest status post AICD in 2018  History of MCA CVA with hemorrhagic conversion requiring right frontotemporal decompressive hemicraniotomy, aphasic/left hemiplegic   Ventriculomegaly secondary to suspected hydrocephalus  History of neuroendocrine cancer of the small bowel status post resection in 2018   HTN   HLD   History of CAD  Prophylaxis    Switch ceftriaxone to cefepime to cover GNR in respiratory cultures in case it is Pseudomonas   Supplemental oxygen via trach to maintain saturations more than 90%   Pulmonary toileting by respiratory therapist, secretions better and therefore will decrease 3% nebs to p.r.n.  Patient has low-grade temp spikes, no leukocytosis, but tachycardic  Await final speciation/sensitivity   Urine cultures are negative   Rate is poorly controlled   Keep p.o. Cardizem, p.o. metoprolol tartrate   On p.r.n. IV Cardizem and p.r.n. IV metoprolol   In case of continued elevation above 120 will consider Cardizem GTT  Patient is scheduled to undergo large volume LP on 07/24 with Dr. Thomas   If he is in-house next week we can consult Neurosurgery on Monday so that it can be done before he returns back to nursing home  Hypokalemia replaced appropriately-50 mEq x 1 dose  Seizures now controlled on Keppra   Check EEG  In case of recurrence will consult Neurology  Few episodes of diarrhea which  improved with Questran, questionable dark emesis prior to transfer from nursing home, no more here and hemoglobin is stable, hold off on GI evaluation  Xarelto has been on hold which I will resume since he is not overtly bleeding  Continue Protonix  Continue other home meds-Seroquel, losartan, finasteride, BuSpar   DVT prophylaxis-resumed Mollyrelto        Violeta Hylton MD   07/20/2024

## 2024-07-21 PROBLEM — N30.00 ACUTE CYSTITIS WITHOUT HEMATURIA: Status: ACTIVE | Noted: 2024-07-21

## 2024-07-21 LAB
ALBUMIN SERPL-MCNC: 3.1 G/DL (ref 3.4–4.8)
ALBUMIN/GLOB SERPL: 0.9 RATIO (ref 1.1–2)
ALP SERPL-CCNC: 143 UNIT/L (ref 40–150)
ALT SERPL-CCNC: 18 UNIT/L (ref 0–55)
ANION GAP SERPL CALC-SCNC: 10 MEQ/L
AST SERPL-CCNC: 20 UNIT/L (ref 5–34)
BASOPHILS # BLD AUTO: 0.1 X10(3)/MCL
BASOPHILS NFR BLD AUTO: 1.1 %
BILIRUB SERPL-MCNC: 0.7 MG/DL
BUN SERPL-MCNC: 20.3 MG/DL (ref 8.4–25.7)
CALCIUM SERPL-MCNC: 7.9 MG/DL (ref 8.8–10)
CHLORIDE SERPL-SCNC: 112 MMOL/L (ref 98–107)
CO2 SERPL-SCNC: 20 MMOL/L (ref 23–31)
CREAT SERPL-MCNC: 0.7 MG/DL (ref 0.73–1.18)
CREAT/UREA NIT SERPL: 29
EOSINOPHIL # BLD AUTO: 0.34 X10(3)/MCL (ref 0–0.9)
EOSINOPHIL NFR BLD AUTO: 3.6 %
ERYTHROCYTE [DISTWIDTH] IN BLOOD BY AUTOMATED COUNT: 15.9 % (ref 11.5–17)
GFR SERPLBLD CREATININE-BSD FMLA CKD-EPI: >60 ML/MIN/1.73/M2
GLOBULIN SER-MCNC: 3.4 GM/DL (ref 2.4–3.5)
GLUCOSE SERPL-MCNC: 102 MG/DL (ref 82–115)
HCT VFR BLD AUTO: 38.6 % (ref 42–52)
HGB BLD-MCNC: 12 G/DL (ref 14–18)
IMM GRANULOCYTES # BLD AUTO: 0.03 X10(3)/MCL (ref 0–0.04)
IMM GRANULOCYTES NFR BLD AUTO: 0.3 %
LYMPHOCYTES # BLD AUTO: 1.86 X10(3)/MCL (ref 0.6–4.6)
LYMPHOCYTES NFR BLD AUTO: 19.7 %
MCH RBC QN AUTO: 26.7 PG (ref 27–31)
MCHC RBC AUTO-ENTMCNC: 31.1 G/DL (ref 33–36)
MCV RBC AUTO: 86 FL (ref 80–94)
MONOCYTES # BLD AUTO: 1.17 X10(3)/MCL (ref 0.1–1.3)
MONOCYTES NFR BLD AUTO: 12.4 %
NEUTROPHILS # BLD AUTO: 5.94 X10(3)/MCL (ref 2.1–9.2)
NEUTROPHILS NFR BLD AUTO: 62.9 %
NRBC BLD AUTO-RTO: 0 %
PLATELET # BLD AUTO: 246 X10(3)/MCL (ref 130–400)
PMV BLD AUTO: 9.8 FL (ref 7.4–10.4)
POTASSIUM SERPL-SCNC: 4.5 MMOL/L (ref 3.5–5.1)
PROT SERPL-MCNC: 6.5 GM/DL (ref 5.8–7.6)
RBC # BLD AUTO: 4.49 X10(6)/MCL (ref 4.7–6.1)
SODIUM SERPL-SCNC: 142 MMOL/L (ref 136–145)
WBC # BLD AUTO: 9.44 X10(3)/MCL (ref 4.5–11.5)

## 2024-07-21 PROCEDURE — 63600175 PHARM REV CODE 636 W HCPCS: Performed by: NURSE PRACTITIONER

## 2024-07-21 PROCEDURE — 25500020 PHARM REV CODE 255

## 2024-07-21 PROCEDURE — 99900022

## 2024-07-21 PROCEDURE — 27200966 HC CLOSED SUCTION SYSTEM

## 2024-07-21 PROCEDURE — 95816 EEG AWAKE AND DROWSY: CPT

## 2024-07-21 PROCEDURE — 95816 EEG AWAKE AND DROWSY: CPT | Mod: 26,,, | Performed by: PSYCHIATRY & NEUROLOGY

## 2024-07-21 PROCEDURE — 99900035 HC TECH TIME PER 15 MIN (STAT)

## 2024-07-21 PROCEDURE — 85025 COMPLETE CBC W/AUTO DIFF WBC: CPT | Performed by: INTERNAL MEDICINE

## 2024-07-21 PROCEDURE — 25000003 PHARM REV CODE 250: Performed by: INTERNAL MEDICINE

## 2024-07-21 PROCEDURE — 99900026 HC AIRWAY MAINTENANCE (STAT)

## 2024-07-21 PROCEDURE — 63600175 PHARM REV CODE 636 W HCPCS: Performed by: INTERNAL MEDICINE

## 2024-07-21 PROCEDURE — 94760 N-INVAS EAR/PLS OXIMETRY 1: CPT

## 2024-07-21 PROCEDURE — 36415 COLL VENOUS BLD VENIPUNCTURE: CPT | Performed by: INTERNAL MEDICINE

## 2024-07-21 PROCEDURE — 27000221 HC OXYGEN, UP TO 24 HOURS

## 2024-07-21 PROCEDURE — 99900031 HC PATIENT EDUCATION (STAT)

## 2024-07-21 PROCEDURE — 25000003 PHARM REV CODE 250: Performed by: NURSE PRACTITIONER

## 2024-07-21 PROCEDURE — 80053 COMPREHEN METABOLIC PANEL: CPT | Performed by: INTERNAL MEDICINE

## 2024-07-21 PROCEDURE — 21400001 HC TELEMETRY ROOM

## 2024-07-21 RX ORDER — SCOLOPAMINE TRANSDERMAL SYSTEM 1 MG/1
1 PATCH, EXTENDED RELEASE TRANSDERMAL
Status: DISCONTINUED | OUTPATIENT
Start: 2024-07-21 | End: 2024-07-26 | Stop reason: HOSPADM

## 2024-07-21 RX ORDER — ONDANSETRON HYDROCHLORIDE 2 MG/ML
4 INJECTION, SOLUTION INTRAVENOUS EVERY 6 HOURS PRN
Status: DISCONTINUED | OUTPATIENT
Start: 2024-07-21 | End: 2024-07-26 | Stop reason: HOSPADM

## 2024-07-21 RX ADMIN — METOPROLOL TARTRATE 100 MG: 50 TABLET, FILM COATED ORAL at 10:07

## 2024-07-21 RX ADMIN — CHOLESTYRAMINE 4 G: 4 POWDER, FOR SUSPENSION ORAL at 10:07

## 2024-07-21 RX ADMIN — DIATRIZOATE MEGLUMINE AND DIATRIZOATE SODIUM 30 ML: 660; 100 LIQUID ORAL; RECTAL at 03:07

## 2024-07-21 RX ADMIN — CEFEPIME 1 G: 1 INJECTION, POWDER, FOR SOLUTION INTRAMUSCULAR; INTRAVENOUS at 12:07

## 2024-07-21 RX ADMIN — ONDANSETRON 4 MG: 2 INJECTION INTRAMUSCULAR; INTRAVENOUS at 08:07

## 2024-07-21 RX ADMIN — PANTOPRAZOLE SODIUM 40 MG: 40 INJECTION, POWDER, LYOPHILIZED, FOR SOLUTION INTRAVENOUS at 10:07

## 2024-07-21 RX ADMIN — LEUCINE, PHENYLALANINE, LYSINE, METHIONINE, ISOLEUCINE, VALINE, HISTIDINE, THREONINE, TRYPTOPHAN, ALANINE, GLYCINE, ARGININE, PROLINE, SERINE, TYROSINE, SODIUM ACETATE, DIBASIC POTASSIUM PHOSPHATE, MAGNESIUM CHLORIDE, SODIUM CHLORIDE, CALCIUM CHLORIDE, DEXTROSE
311; 238; 247; 170; 255; 247; 204; 179; 77; 880; 438; 489; 289; 213; 17; 297; 261; 51; 77; 33; 5 INJECTION INTRAVENOUS at 11:07

## 2024-07-21 RX ADMIN — Medication: at 10:07

## 2024-07-21 RX ADMIN — DILTIAZEM HYDROCHLORIDE 60 MG: 60 TABLET, FILM COATED ORAL at 05:07

## 2024-07-21 RX ADMIN — BACITRACIN ZINC, NEOMYCIN, POLYMYXIN B: 400; 3.5; 5 OINTMENT TOPICAL at 10:07

## 2024-07-21 RX ADMIN — BUSPIRONE HYDROCHLORIDE 5 MG: 5 TABLET ORAL at 10:07

## 2024-07-21 RX ADMIN — DILTIAZEM HYDROCHLORIDE 60 MG: 60 TABLET, FILM COATED ORAL at 12:07

## 2024-07-21 RX ADMIN — CEFEPIME 1 G: 1 INJECTION, POWDER, FOR SOLUTION INTRAMUSCULAR; INTRAVENOUS at 02:07

## 2024-07-21 RX ADMIN — DILTIAZEM HYDROCHLORIDE 60 MG: 60 TABLET, FILM COATED ORAL at 10:07

## 2024-07-21 RX ADMIN — QUETIAPINE FUMARATE 25 MG: 25 TABLET ORAL at 10:07

## 2024-07-21 RX ADMIN — MUPIROCIN: 20 OINTMENT TOPICAL at 10:07

## 2024-07-21 RX ADMIN — LEVETIRACETAM INJECTION 1500 MG: 15 INJECTION INTRAVENOUS at 10:07

## 2024-07-21 RX ADMIN — RIVAROXABAN 20 MG: 10 TABLET, FILM COATED ORAL at 05:07

## 2024-07-21 RX ADMIN — LOSARTAN POTASSIUM 100 MG: 50 TABLET, FILM COATED ORAL at 10:07

## 2024-07-21 RX ADMIN — SCOPOLAMINE 1 PATCH: 1 PATCH TRANSDERMAL at 08:07

## 2024-07-21 RX ADMIN — CEFEPIME 1 G: 1 INJECTION, POWDER, FOR SOLUTION INTRAMUSCULAR; INTRAVENOUS at 05:07

## 2024-07-21 RX ADMIN — FINASTERIDE 5 MG: 5 TABLET, FILM COATED ORAL at 10:07

## 2024-07-21 RX ADMIN — BUSPIRONE HYDROCHLORIDE 5 MG: 5 TABLET ORAL at 02:07

## 2024-07-21 RX ADMIN — DILTIAZEM HYDROCHLORIDE 60 MG: 60 TABLET, FILM COATED ORAL at 06:07

## 2024-07-21 RX ADMIN — MICONAZOLE NITRATE 2 % TOPICAL POWDER: at 10:07

## 2024-07-21 RX ADMIN — DILTIAZEM HYDROCHLORIDE 60 MG: 60 TABLET, FILM COATED ORAL at 02:07

## 2024-07-21 RX ADMIN — Medication 1 EACH: at 10:07

## 2024-07-21 NOTE — PROGRESS NOTES
[facility]  Hospital Medicine Progress Note        Chief Complaint: Inpatient Follow-up    HPI:     68-year-old  male with significant history of PAF on Xarelto, VFib arrest status post AICD in 2018, MCA CVA with hemorrhagic conversion requiring decompressive hemicraniotomy in November of 2023 with residual aphasia, hemiplegia status post tracheostomy, peg tube placement.  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  Patient is a nursing home resident.  High-volume LP scheduled as outpatient on 07/24/2024 by Dr. Thomas to see if  shunt is warranted.  Patient was brought to the ED on 07/18 from the nursing home for questionable dark emesis.  Noted to be in AFib RVR.  CT head with chronic findings.  Lab significant for leukocytosis, bacteriuria.  Mentation at baseline.  Admitted to hospital medicine services for sepsis secondary to UTI versus tracheobronchitis, urine and respiratory cultures collected.  Concern for new onset seizures for which Keppra initiated, seizure precautions.  Urine cultures negative, but respiratory cultures with Gram-negative rods, ceftriaxone switched to cefepime.  Secretions clearing up on 3% neb.    Interval Hx:     Patient seen at bedside, had EEG completed today, awake and alert, mentation at baseline, does not communicate, hemodynamics are stable except that BP slightly accelerated, no fever spikes, but patient is now having issues tolerating tube feedings, per nursing staff concern that he is vomiting tube feedings, but no much residual    Objective/physical exam:    General: In no acute distress, afebrile  Chest: Clear to auscultation bilaterally  Heart: S1, S2, no appreciable murmur  Abdomen: Soft, nontender, BS +  MSK: Warm, no lower extremity edema, no clubbing or cyanosis  Neurologic:  Awake, alert    VITAL SIGNS: 24 HRS MIN & MAX LAST    Temp  Min: 97.9 °F (36.6 °C)  Max: 99.2 °F (37.3 °C) 98.9 °F (37.2 °C)   BP  Min: 112/73  Max: 138/88 112/73   Pulse  Min: 61  Max: 155  93   Resp  Min: 18  Max: 35 18   SpO2  Min: 96 %  Max: 100 % 100 %       Recent Labs   Lab 07/20/24  0355   WBC 9.65   RBC 4.76   HGB 12.4*   HCT 38.0*   MCV 79.8*   MCH 26.1*   MCHC 32.6*   RDW 15.8      MPV 10.3         Recent Labs   Lab 07/18/24 2115 07/19/24  0315 07/20/24  0355   NA  --    < > 142   K  --    < > 3.2*   CL  --    < > 110*   CO2  --    < > 19*   BUN  --    < > 9.9   CREATININE  --    < > 0.72*   CALCIUM  --    < > 9.1   PH 7.680*  --   --    MG  --    < > 2.20   ALBUMIN  --    < > 3.6   ALKPHOS  --    < > 174*   ALT  --    < > 23   AST  --    < > 23   BILITOT  --    < > 0.7    < > = values in this interval not displayed.          Microbiology Results (last 7 days)       Procedure Component Value Units Date/Time    Urine culture [[id number]]  (Abnormal)  (Susceptibility) Collected: 07/18/24 1648    Order Status: Resulted Specimen: Urine Updated: 07/21/24 0707     Urine Culture >/= 100,000 colonies/ml Klebsiella pneumoniae ssp pneumoniae    Respiratory Culture [[id number]]  (Abnormal) Collected: 07/19/24 0258    Order Status: Completed Specimen: Sputum, Expectorated Updated: 07/21/24 0705     Respiratory Culture Many Gram-negative Rods      Many PRESUMPTIVE PROTEUS SPECIES     Comment: with normal respiratory niyah        GRAM STAIN Quality 3+      Moderate Gram negative diplococci      Many Gram Negative Rods      Moderate Gram Positive Rods      Few Gram positive cocci    Blood Culture #1 **CANNOT BE ORDERED STAT** [[id number]]  (Normal) Collected: 07/18/24 1812    Order Status: Completed Specimen: Blood Updated: 07/20/24 2101     Blood Culture No Growth At 48 Hours    Blood Culture #2 **CANNOT BE ORDERED STAT** [[id number]]  (Normal) Collected: 07/18/24 1812    Order Status: Completed Specimen: Blood Updated: 07/20/24 2101     Blood Culture No Growth  At 48 Hours             Scheduled Med:   busPIRone  5 mg Per G Tube TID    ceFEPime IV (PEDS and ADULTS)  1 g Intravenous Q8H    cholestyramine-aspartame  4 g Per G Tube BID    diltiaZEM  60 mg Per G Tube Q6H    finasteride  5 mg Per G Tube Daily    lactated ringers  500 mL Intravenous Once    Lactobacillus rhamnosus GG  1 packet Per G Tube Daily    levETIRAcetam (Keppra) IV (PEDS and ADULTS)  1,500 mg Intravenous Q12H    losartan  100 mg Per G Tube Daily    metoprolol tartrate  100 mg Per G Tube BID    miconazole NITRATE 2 %   Topical (Top) BID    mupirocin   Nasal BID    neomycin-bacitracin-polymyxin   Topical (Top) BID    pantoprazole  40 mg Intravenous Q12H    QUEtiapine  25 mg Per G Tube BID    rivaroxaban  20 mg Per G Tube with dinner    zinc oxide-cod liver oil   Topical (Top) BID          Assessment/Plan:    Acute tracheobronchitis secondary to Proteus/Acinetobacter  Acute hypoxemic respiratory failure secondary to above  Sepsis secondary to above  Bacteriuria-likely chronic colonization  New onset seizure disorder  PAF with intermittent RVR  PEG/trach dependent  Peg tube malfunction/tube feeding intolerance  History of VFib arrest status post AICD in 2018  History of MCA CVA with hemorrhagic conversion requiring right frontotemporal decompressive hemicraniotomy, aphasic/left hemiplegic   Ventriculomegaly secondary to suspected hydrocephalus  History of neuroendocrine cancer of the small bowel status post resection in 2018   HTN   HLD   History of CAD  Prophylaxis      Respiratory cultures-Acinetobacter, Proteus   Cefepime expected to cover both   Await sensitivities though   Afebrile, hemodynamically stable with no leukocytosis  Continue 3% nebs as needed to clear up secretions  Supplemental oxygen via trach to maintain saturations more than 90% , on 2 L  Urine cultures are negative   Rate is better controlled today  Keep p.o. Cardizem, p.o. metoprolol tartrate   On p.r.n. IV Cardizem and p.r.n. IV  metoprolol   Patient is scheduled to undergo large volume LP on 07/24 with Dr. Thomas   we can consult Neurosurgery on Monday so that it can be done before he returns back to nursing home  Seizures now controlled on Keppra   EEG done today, await results  In case of recurrence will consult Neurology  Few episodes of diarrhea which improved with Questran, questionable dark emesis prior to transfer from nursing home, no more here and hemoglobin is stable, hold off on GI evaluation  I resumed Xarelto 7/20, tolerating without overt bleeding  Patient having issues with PEG tube feedings, no much residuals, but noted vomiting of PEG feedings   Will involve GI  Continue other home meds-Seroquel, losartan, finasteride, BuSpar   DVT prophylaxis-on Xarelto      Violeta Hylton MD   07/21/2024

## 2024-07-21 NOTE — CONSULTS
Consult Note    Reason for Consult:      We were consulted by Dr. Mota to evaluate this patient for PEG tube issues.     HPI:     68 y.o. male known to Dr. Escalona from inpatient care (primary GI is Dr. 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  EKG on admit showed AFib with RVR.  Given metoprolol and started on IV antibiotics for UTI.  Admitted for further care.  Patient is not tolerating tube feeds today therefore GI was consulted.    History obtained via chart review and nurse report. Patient's nurse states patient's drool has looked like TFs and patient intermittently spits up what looks like TFs and sometimes patient's medicine. Residuals 10-20 cc/hr. Patient is having bowel movements.     Previous records reviewed...  01/02/2024 EGD/PEG: revealing relatively unremarkable exam, and a 24 FR peg was placed with external bumper at 4 cm.    PCP:  Jl Briones MD    Review of patient's allergies indicates:  No Known Allergies     Current Facility-Administered Medications   Medication Dose Route Frequency Provider Last Rate Last Admin    acetaminophen tablet 1,000 mg  1,000 mg Per G Tube Q6H PRN Sekou Contreras MD   1,000 mg at 07/18/24 2242    albuterol-ipratropium 2.5 mg-0.5 mg/3 mL nebulizer solution 3 mL  3 mL Nebulization Q6H PRN Sekou Ybarra MD        Amino acid 4.25% - dextrose 5% (CLINIMIX-E) solution (1L provides 42.5 gm AA, 50 gm CHO (170 kcal/L dextrose), Na 35, K 30, Mg 5, Ca 4.5, Acetate 70, Cl 39, Phos 15)   Intravenous Continuous Violeta Hylton MD        busPIRone tablet 5 mg  5 mg Per G Tube TID Sekou Contreras MD   5 mg at 07/21/24 1021    ceFEPIme (MAXIPIME) 1 g in D5W 100 mL IVPB (MB+)   1 g Intravenous Q8H Violeta Hylton MD   Stopped at 07/21/24 0118    cholestyramine-aspartame 4 gram packet 4 g  4 g Per G Tube BID Sekou Contreras MD   4 g at 07/21/24 1031    cloNIDine tablet 0.1 mg  0.1 mg Per G Tube Q8H PRN Virgie Hyman FNP        diltiaZEM injection 10 mg  10 mg Intravenous Q1H PRN Sekou Contreras MD   10 mg at 07/19/24 2202    diltiaZEM tablet 60 mg  60 mg Per G Tube Q6H Sekou Contreras MD   60 mg at 07/21/24 0653    finasteride tablet 5 mg  5 mg Per G Tube Daily Sekou Contreras MD   5 mg at 07/21/24 1021    lactated ringers bolus 500 mL  500 mL Intravenous Once Virgie Hyman FNP        Lactobacillus rhamnosus GG 5 billion cell packet (PEDS) 1 each  1 packet Per G Tube Daily Violeta Hylton MD   1 each at 07/21/24 1023    levETIRAcetam in NaCl (iso-os) IVPB 1,500 mg  1,500 mg Intravenous Q12H Virgie Hyman  mL/hr at 07/21/24 1048 1,500 mg at 07/21/24 1048    losartan tablet 100 mg  100 mg Per G Tube Daily Sekou Contreras MD   100 mg at 07/21/24 1021    metoprolol injection 5 mg  5 mg Intravenous Q6H PRN Violeta Hylton MD        metoprolol tartrate (LOPRESSOR) tablet 100 mg  100 mg Per G Tube BID Sekou Contreras MD   100 mg at 07/21/24 1021    miconazole NITRATE 2 % top powder   Topical (Top) BID Sekou Ybarra MD   Given at 07/21/24 1022    mupirocin 2 % ointment   Nasal BID Juancarlos Mota MD   Given at 07/21/24 1022    neomycin-bacitracin-polymyxin ointment   Topical (Top) BID Sekou Ybarra MD   Given at 07/21/24 1022    pantoprazole injection 40 mg  40 mg Intravenous Q12H Sekou Contreras MD   40 mg at 07/21/24 1021    QUEtiapine tablet 25 mg  25 mg Per G Tube BID Sekou Contreras MD   25 mg at 07/21/24 1021    rivaroxaban tablet 20 mg  20 mg Per G Tube with dinner Violeta Hylton MD   20 mg at 07/20/24 1513    sodium chloride 3% nebulizer solution 4 mL  4 mL Nebulization Q6H PRN Violeta Hylton MD        zinc  oxide-cod liver oil 40 % paste   Topical (Top) BID Sekou Ybarra MD   Given at 07/21/24 1022     Medications Prior to Admission   Medication Sig Dispense Refill Last Dose    ascorbic Acid (VITAMIN C) 500 mg CpSR 500 mg 2 (two) times daily. Per PEG tube   7/18/2024    busPIRone (BUSPAR) 5 MG Tab 5 mg by Per G Tube route 3 (three) times daily.   7/18/2024    cholestyramine (QUESTRAN) 4 gram packet 4 g once daily. Via PEG tube   7/18/2024    cholestyramine-aspartame (QUESTRAN LIGHT) 4 gram PwPk 1 packet (4 g total) by Per G Tube route 2 (two) times daily. 180 packet 3 7/18/2024    cloNIDine (CATAPRES) 0.1 MG tablet 0.1 mg by Per G Tube route every 8 (eight) hours as needed (Give for systolic >180).   Past Month    diltiaZEM (CARDIZEM) 60 MG tablet 60 mg by Per G Tube route every 6 (six) hours.   7/18/2024    famotidine (PEPCID) 20 MG tablet 1 tablet (20 mg total) by Per G Tube route 2 (two) times daily. (Patient taking differently: 20 mg by Per G Tube route once daily.) 60 tablet 11 7/18/2024    finasteride (PROSCAR) 5 mg tablet Take 1 tablet (5 mg total) by mouth once daily. 30 tablet 11 7/18/2024    hydrALAZINE (APRESOLINE) 50 MG tablet 75 mg by Per G Tube route every 8 (eight) hours as needed (for elevated BP).   Past Week    L. acidophilus/L.bulgaricus (FLORANEX ORAL) 1 packet by PEG Tube route Daily.   7/18/2024    levetiracetam 500 mg/5 mL (5 mL) Soln 1,500 mg by Per G Tube route 2 (two) times daily.   7/18/2024    LIPITOR 10 mg tablet 10 mg by Per G Tube route once daily.   7/18/2024    losartan (COZAAR) 100 MG tablet 100 mg by Per G Tube route once daily.   7/18/2024    metoprolol tartrate (LOPRESSOR) 100 MG tablet 100 mg by Per G Tube route 2 (two) times daily.   7/18/2024    multivitamin (THERAGRAN) per tablet 1 tablet by Per G Tube route once daily.   7/18/2024    potassium chloride SA (KLOR-CON M15) 15 MEQ tablet 2 tablets once daily. Via PEG Tube   7/18/2024    protein supplement (PROMOD PROTEIN  ORAL) 30 mLs by Per G Tube route once daily.   7/18/2024    QUEtiapine (SEROQUEL) 25 MG Tab 25 mg by Per G Tube route 2 (two) times daily.   7/18/2024    scopolamine (TRANSDERM-SCOP) 1.3-1.5 mg (1 mg over 3 days) Place 1 patch onto the skin Every 3 (three) days.   Past Week    tamsulosin (FLOMAX) 0.4 mg Cap Take 1 capsule (0.4 mg total) by mouth every evening. 30 capsule 11 7/18/2024    venlafaxine 75 mg TR24 1 tablet by Per G Tube route 2 (two) times a day.   7/18/2024    XARELTO 20 mg Tab 1 tablet by Per G Tube route nightly.   7/18/2024    furosemide (LASIX) 80 MG tablet 80 mg by Per G Tube route as needed (for Edema).   More than a month       Past Medical History:  Past Medical History:   Diagnosis Date    Arthritis     Atrial fibrillation     BPH (benign prostatic hyperplasia)     Cardiac arrest     Coronary artery disease     Cyst, kidney, acquired     Diverticulosis     Hyperlipidemia     Hypertension     MI (myocardial infarction)     Obesity     Steatosis of liver     Stroke       Past Surgical History:  Past Surgical History:   Procedure Laterality Date    A-V CARDIAC PACEMAKER INSERTION Right     CARDIAC CATHETERIZATION      COLONOSCOPY W/ BIOPSIES      CRANIECTOMY Right 12/20/2023    Procedure: CRANIECTOMY;  Surgeon: Artem Can MD;  Location: [facility];  Service: Neurosurgery;  Laterality: Right;    ESOPHAGOGASTRODUODENOSCOPY W/ PEG N/A 1/2/2024    Procedure: PEG;  Surgeon: Tani Day MD;  Location: [facility] ENDOSCOPY;  Service: Gastroenterology;  Laterality: N/A;    excision of colon      TRACHEOSTOMY N/A 12/29/2023    Procedure: CREATION, TRACHEOSTOMY;  Surgeon: Patricia Winslow MD;  Location: [facility] OR;  Service: ENT;  Laterality: N/A;  REQ 1130 //  NEEDS 2 SCRUBS      Family History:  Family History   Problem Relation Name Age of Onset    Hypertension Mother      Hypertension Father      Hypertension Sister       Social History:  Social History     Tobacco Use    Smoking status: Never     Smokeless tobacco: Never   Substance Use Topics    Alcohol use: Not Currently       Review of Systems:     Review of Systems   Unable to perform ROS: Patient nonverbal       Objective:     VITAL SIGNS: 24 HR MIN & MAX LAST    Temp  Min: 97.9 °F (36.6 °C)  Max: 99.2 °F (37.3 °C)  98.9 °F (37.2 °C)        BP  Min: 112/73  Max: 156/90  (!) 156/90     Pulse  Min: 61  Max: 155  72     Resp  Min: 18  Max: 35  18    SpO2  Min: 96 %  Max: 100 %  98 %        Intake/Output Summary (Last 24 hours) at 7/21/2024 1253  Last data filed at 7/21/2024 1138  Gross per 24 hour   Intake --   Output 1300 ml   Net -1300 ml       Physical Exam  Constitutional:       General: He is not in acute distress.     Appearance: He is ill-appearing.   HENT:      Head: Normocephalic and atraumatic.   Eyes:      General: No scleral icterus.     Extraocular Movements: Extraocular movements intact.   Neck:      Comments: Trach in place  Cardiovascular:      Rate and Rhythm: Normal rate and regular rhythm.   Pulmonary:      Effort: Pulmonary effort is normal. No respiratory distress.   Abdominal:      General: Bowel sounds are normal. There is no distension.      Palpations: Abdomen is soft. There is no mass.      Tenderness: There is no abdominal tenderness. There is no guarding or rebound.      Comments: Peg tube in place in epigastrium/LUQ with external bumper at 9 cm with a decent amount of to between skin and external bumper.  Attempted to tighten external bumper but felt resistance so left at 8 cm   Musculoskeletal:      Right lower leg: No edema.      Left lower leg: No edema.   Skin:     General: Skin is warm and dry.      Coloration: Skin is not jaundiced.   Neurological:      Mental Status: He is alert.      Comments: Unable to assess orientation   Psychiatric:      Comments: Unable to assess.           Recent Results (from the past 48 hour(s))   Comprehensive Metabolic Panel    Collection Time: 07/20/24  3:55 AM   Result Value Ref Range     Sodium 142 136 - 145 mmol/L    Potassium 3.2 (L) 3.5 - 5.1 mmol/L    Chloride 110 (H) 98 - 107 mmol/L    CO2 19 (L) 23 - 31 mmol/L    Glucose 113 82 - 115 mg/dL    Blood Urea Nitrogen 9.9 8.4 - 25.7 mg/dL    Creatinine 0.72 (L) 0.73 - 1.18 mg/dL    Calcium 9.1 8.8 - 10.0 mg/dL    Protein Total 7.2 5.8 - 7.6 gm/dL    Albumin 3.6 3.4 - 4.8 g/dL    Globulin 3.6 (H) 2.4 - 3.5 gm/dL    Albumin/Globulin Ratio 1.0 (L) 1.1 - 2.0 ratio    Bilirubin Total 0.7 <=1.5 mg/dL     (H) 40 - 150 unit/L    ALT 23 0 - 55 unit/L    AST 23 5 - 34 unit/L    eGFR >60 mL/min/1.73/m2    Anion Gap 13.0 mEq/L    BUN/Creatinine Ratio 14    Magnesium    Collection Time: 07/20/24  3:55 AM   Result Value Ref Range    Magnesium Level 2.20 1.60 - 2.60 mg/dL   CBC with Differential    Collection Time: 07/20/24  3:55 AM   Result Value Ref Range    WBC 9.65 4.50 - 11.50 x10(3)/mcL    RBC 4.76 4.70 - 6.10 x10(6)/mcL    Hgb 12.4 (L) 14.0 - 18.0 g/dL    Hct 38.0 (L) 42.0 - 52.0 %    MCV 79.8 (L) 80.0 - 94.0 fL    MCH 26.1 (L) 27.0 - 31.0 pg    MCHC 32.6 (L) 33.0 - 36.0 g/dL    RDW 15.8 11.5 - 17.0 %    Platelet 320 130 - 400 x10(3)/mcL    MPV 10.3 7.4 - 10.4 fL    Neut % 66.2 %    Lymph % 23.1 %    Mono % 9.2 %    Eos % 0.6 %    Basophil % 0.7 %    Lymph # 2.23 0.6 - 4.6 x10(3)/mcL    Neut # 6.38 2.1 - 9.2 x10(3)/mcL    Mono # 0.89 0.1 - 1.3 x10(3)/mcL    Eos # 0.06 0 - 0.9 x10(3)/mcL    Baso # 0.07 <=0.2 x10(3)/mcL    IG# 0.02 0 - 0.04 x10(3)/mcL    IG% 0.2 %    NRBC% 0.0 %   Comprehensive Metabolic Panel    Collection Time: 07/21/24  8:45 AM   Result Value Ref Range    Sodium 142 136 - 145 mmol/L    Potassium 4.5 3.5 - 5.1 mmol/L    Chloride 112 (H) 98 - 107 mmol/L    CO2 20 (L) 23 - 31 mmol/L    Glucose 102 82 - 115 mg/dL    Blood Urea Nitrogen 20.3 8.4 - 25.7 mg/dL    Creatinine 0.70 (L) 0.73 - 1.18 mg/dL    Calcium 7.9 (L) 8.8 - 10.0 mg/dL    Protein Total 6.5 5.8 - 7.6 gm/dL    Albumin 3.1 (L) 3.4 - 4.8 g/dL    Globulin 3.4 2.4 - 3.5 gm/dL     Albumin/Globulin Ratio 0.9 (L) 1.1 - 2.0 ratio    Bilirubin Total 0.7 <=1.5 mg/dL     40 - 150 unit/L    ALT 18 0 - 55 unit/L    AST 20 5 - 34 unit/L    eGFR >60 mL/min/1.73/m2    Anion Gap 10.0 mEq/L    BUN/Creatinine Ratio 29    CBC with Differential    Collection Time: 07/21/24  8:45 AM   Result Value Ref Range    WBC 9.44 4.50 - 11.50 x10(3)/mcL    RBC 4.49 (L) 4.70 - 6.10 x10(6)/mcL    Hgb 12.0 (L) 14.0 - 18.0 g/dL    Hct 38.6 (L) 42.0 - 52.0 %    MCV 86.0 80.0 - 94.0 fL    MCH 26.7 (L) 27.0 - 31.0 pg    MCHC 31.1 (L) 33.0 - 36.0 g/dL    RDW 15.9 11.5 - 17.0 %    Platelet 246 130 - 400 x10(3)/mcL    MPV 9.8 7.4 - 10.4 fL    Neut % 62.9 %    Lymph % 19.7 %    Mono % 12.4 %    Eos % 3.6 %    Basophil % 1.1 %    Lymph # 1.86 0.6 - 4.6 x10(3)/mcL    Neut # 5.94 2.1 - 9.2 x10(3)/mcL    Mono # 1.17 0.1 - 1.3 x10(3)/mcL    Eos # 0.34 0 - 0.9 x10(3)/mcL    Baso # 0.10 <=0.2 x10(3)/mcL    IG# 0.03 0 - 0.04 x10(3)/mcL    IG% 0.3 %    NRBC% 0.0 %       X-Ray Chest 1 View    Result Date: 7/21/2024  EXAMINATION: XR CHEST 1 VIEW CLINICAL HISTORY: sob; TECHNIQUE: Single frontal view of the chest was performed. COMPARISON: 07/18/2024 FINDINGS: LINES AND TUBES: Tracheostomy cannula projects over the trachea with tip at the level of the clavicular heads.  Dual lead cardiac pacer device is in place via right subclavian approach with leads overlying the right atrium and right ventricle. MEDIASTINUM AND MARJORIE: Cardiac silhouette is enlarged.  Aortic atherosclerosis. LUNGS: No lobar consolidation. No edema. PLEURA:No pleural effusion. No pneumothorax. OTHER: No acute osseous abnormality.     Enlarged cardiac silhouette without edema Electronically signed by: Laureen Christina Date:    07/21/2024 Time:    12:08    CT Chest Abdomen Pelvis With IV Contrast (XPD) NO Oral Contrast    Result Date: 7/19/2024  EXAMINATION: CT CHEST ABDOMEN PELVIS WITH IV CONTRAST (XPD) CLINICAL HISTORY: absent bowel sounds/Vomiting; TECHNIQUE: Low dose  axial images, sagittal and coronal reformations were obtained from the thoracic inlet to the pubic symphysis following the IV contrast administration. Automatic exposure control is utilized to reduce patient radiation exposure. COMPARISON: None FINDINGS: The lungs are adequately aerated.  No pneumothorax is seen.  No pulmonary contusion is seen.  No pleural effusion is seen.  No infiltrate is seen. The thoracic aorta is normal in caliber.  No dissection or aneurysm is seen.  No retrosternal hematoma is seen. The abdominal aorta appears grossly unremarkable.  No dissection or posttraumatic changes are seen. The heart appears normal. The esophagus is fluid-filled and dilated. There is a gastrostomy tube in the stomach. The liver appears normal.  No liver mass or lesion is seen.  Portal and hepatic veins appear normal. The gallbladder appears normal.  No gallstones are seen.. The spleen appears normal.  No splenic mass is seen..  The pancreas appears grossly unremarkable.  No pancreatic mass or lesion is seen.  No inflammation is seen. No adrenal abnormality is seen.  No adrenal nodule is seen. The kidneys are well perfused.  There is some cysts seen in both kidneys.  No hydronephrosis is seen.  No hydroureter is seen.  No retroperitoneal hematoma is seen. Visualized portions of the bowel shows no acute abnormality.  No colitis is seen.  No diverticulitis is seen.  No colonic mass is seen. No free air is seen.  No free fluid is seen. The urinary bladder is decompressed due to Fernandez catheter but there are some inflammatory changes and wall thickening seen in the urinary bladder concerning for cystitis.. Dysplastic changes seen in the left hip with significant amount heterotrophic bone formation seen.  These findings appear to be chronic.     Dilated fluid-filled esophagus.  Findings are consistent with gastroesophageal reflux Urinary bladder wall thickening and inflammatory changes seen concerning for cystitis  Electronically signed by: Erick Grant Date:    07/19/2024 Time:    10:59    CT Head Without Contrast    Result Date: 7/19/2024  Technique: CT of the head was performed without intravenous contrast with direct axial as well as coronal and sagittal reconstruction images. Comparison: Comparison is with study dated 2024-05-15 10:08:14. Clinical history: Mental status change, unknown cause; vomiting with hx of ventriculomnegaly. FINDINGS: There is no significant change in the large area of encephalomalacia involving the right cerebrum. There is likewise no apparent change in the degree of outward protrusion of the cerebral parenchyma through the wide right sided craniotomy defect. The degree of dilatation of the lateral ventricles remains stable. There are no new hypodensities to suggest an acute infarct. No acute intracranial hemorrhage is seen. CSF spaces: Subarachnoid space: Within normal limits. Brain parenchyma: There is no acute large vessel territory infarct. Calvarium: Post operative wide craniotomy in the right is again noted. Maxillofacial Structures: Paranasal sinuses: The visualized paranasal sinuses appear clear with no significant mucoperiosteal thickening or air fluid levels identified.     Impression: 1. There is no significant change in the large area of encephalomalacia involving the right cerebrum. There is likewise no apparent change in the degree of outward protrusion of the cerebral parenchyma through the wide right sided craniotomy defect. The degree of dilatation of the lateral ventricles remains stable. There are no new hypodensities to suggest an acute infarct. No acute intracranial hemorrhage is seen. 2. Details and findings as noted above. No significant discrepancy with overnight report. Electronically signed by: Sami Taylor Date:    07/19/2024 Time:    07:26    X-Ray Chest AP Portable    Result Date: 7/18/2024  EXAMINATION: XR CHEST AP PORTABLE CLINICAL HISTORY: Gastric contents in  respiratory tract, part unspecified causing other injury, initial encounter COMPARISON: 15 May 2024 FINDINGS: Frontal view of the chest was obtained. Tracheostomy remains.  There is right-sided AICD.  Stable cardiomegaly.  Lungs are grossly clear.  No pneumothorax.     No acute findings. Electronically signed by: Tani Calderon Date:    07/18/2024 Time:    16:42      Imaging personally reviewed by myself and SP.    Assessment / Plan:     68 y.o. male known to Dr. Escalona from inpatient care (primary GI is Dr. 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  EKG on admit showed AFib with RVR.  Given metoprolol and started on IV antibiotics for UTI.  Admitted for further care.  Patient is not tolerating tube feeds today therefore GI was consulted.    Reflux of tube feeds  10-20 cc residuals    - keep HOB elevated at least 30 degrees to prevent aspiration  - XR tube check given resistance when attempting to tighten external bumper to proper position   - continue PPI    Thank you for allowing us to participate in this patient's care.

## 2024-07-21 NOTE — PLAN OF CARE
Problem: Adult Inpatient Plan of Care  Goal: Plan of Care Review  Outcome: Progressing  Goal: Patient-Specific Goal (Individualized)  Outcome: Progressing  Goal: Absence of Hospital-Acquired Illness or Injury  Outcome: Progressing  Goal: Optimal Comfort and Wellbeing  Outcome: Progressing  Goal: Readiness for Transition of Care  Outcome: Progressing     Problem: Skin Injury Risk Increased  Goal: Skin Health and Integrity  Outcome: Progressing     Problem: Infection  Goal: Absence of Infection Signs and Symptoms  Outcome: Progressing     Problem: Wound  Goal: Optimal Coping  Outcome: Progressing  Goal: Optimal Functional Ability  Outcome: Progressing  Goal: Absence of Infection Signs and Symptoms  Outcome: Progressing  Goal: Improved Oral Intake  Outcome: Progressing  Goal: Optimal Pain Control and Function  Outcome: Progressing  Goal: Skin Health and Integrity  Outcome: Progressing  Goal: Optimal Wound Healing  Outcome: Progressing     Problem: Sepsis/Septic Shock  Goal: Absence of Infection Signs and Symptoms  Outcome: Progressing  Goal: Optimal Nutrition Intake  Outcome: Progressing     Problem: Fall Injury Risk  Goal: Absence of Fall and Fall-Related Injury  Outcome: Progressing     Problem: Sepsis/Septic Shock  Goal: Optimal Coping  Outcome: Met  Goal: Absence of Bleeding  Outcome: Met  Goal: Blood Glucose Level Within Targeted Range  Outcome: Met

## 2024-07-21 NOTE — AI DETERIORATION ALERT
Artificial Intelligence Notification  [facility]  [address] [place] [address]  Phone: [phone]    This documentation was triggered by an Artificial Intelligence Notification:    Admit Date: 2024   LOS: 2  Code Status: Full Code  : 1955  Age: 68 y.o.  Weight:   Wt Readings from Last 1 Encounters:   24 117.9 kg (260 lb)        Sex: male  Bed: 433/433 A  MRN: [id number]  Attending Physician: Juancarlos Mota MD     Date of Alert: 2024  Time AI Alert Received:             Vitals:    24   BP:    Pulse: (!) 140   Resp: (!) 32   Temp:      SpO2: 100 %      Artificial Intelligence alert discussed with Provider:     Name:    Date/Time of Provider Notification:       Patient Condition: spoke to nurse. HR currently 99, sat 100% in no respiratory distress. No interventions from ICU needed.

## 2024-07-21 NOTE — PROCEDURES
EEG    Dx: Seizure disorder; history of RIGHT sided craniotomy    Duration: 24 min    Technical: Standard digital EEG was performed at [facility]. The 10/20 international system of electrode placement was used.  Photic   stimulation was performed.    Description: The posterior dominant rhythm was 7 Hz.  Right hemispheric slowing was present.  The patient was awake for the duration of the study.  There were   no epileptiform discharges or clinical seizures seen.    Impression: Findings consistent with prior craniotomy.  No active seizure; however, an 'unremarkable' EEG does not rule out intermittent seizure, especially in the setting of encephalomalacia.

## 2024-07-22 LAB
ALBUMIN SERPL-MCNC: 2.9 G/DL (ref 3.4–4.8)
ALBUMIN/GLOB SERPL: 0.9 RATIO (ref 1.1–2)
ALP SERPL-CCNC: 126 UNIT/L (ref 40–150)
ALT SERPL-CCNC: 15 UNIT/L (ref 0–55)
ANION GAP SERPL CALC-SCNC: 12 MEQ/L
AST SERPL-CCNC: 15 UNIT/L (ref 5–34)
BACTERIA SPEC CULT: ABNORMAL
BACTERIA SPEC CULT: ABNORMAL
BACTERIA UR CULT: ABNORMAL
BACTERIA UR CULT: ABNORMAL
BASOPHILS # BLD AUTO: 0.1 X10(3)/MCL
BASOPHILS NFR BLD AUTO: 1.1 %
BILIRUB SERPL-MCNC: 0.7 MG/DL
BUN SERPL-MCNC: 16.4 MG/DL (ref 8.4–25.7)
CALCIUM SERPL-MCNC: 8.7 MG/DL (ref 8.8–10)
CHLORIDE SERPL-SCNC: 112 MMOL/L (ref 98–107)
CO2 SERPL-SCNC: 21 MMOL/L (ref 23–31)
CREAT SERPL-MCNC: 0.71 MG/DL (ref 0.73–1.18)
CREAT/UREA NIT SERPL: 23
EOSINOPHIL # BLD AUTO: 0.69 X10(3)/MCL (ref 0–0.9)
EOSINOPHIL NFR BLD AUTO: 7.8 %
ERYTHROCYTE [DISTWIDTH] IN BLOOD BY AUTOMATED COUNT: 16 % (ref 11.5–17)
GFR SERPLBLD CREATININE-BSD FMLA CKD-EPI: >60 ML/MIN/1.73/M2
GLOBULIN SER-MCNC: 3.2 GM/DL (ref 2.4–3.5)
GLUCOSE SERPL-MCNC: 90 MG/DL (ref 82–115)
GRAM STN SPEC: ABNORMAL
HCT VFR BLD AUTO: 34.8 % (ref 42–52)
HGB BLD-MCNC: 11 G/DL (ref 14–18)
IMM GRANULOCYTES # BLD AUTO: 0.02 X10(3)/MCL (ref 0–0.04)
IMM GRANULOCYTES NFR BLD AUTO: 0.2 %
LYMPHOCYTES # BLD AUTO: 2.39 X10(3)/MCL (ref 0.6–4.6)
LYMPHOCYTES NFR BLD AUTO: 27.2 %
MCH RBC QN AUTO: 26.2 PG (ref 27–31)
MCHC RBC AUTO-ENTMCNC: 31.6 G/DL (ref 33–36)
MCV RBC AUTO: 82.9 FL (ref 80–94)
MONOCYTES # BLD AUTO: 1.22 X10(3)/MCL (ref 0.1–1.3)
MONOCYTES NFR BLD AUTO: 13.9 %
NEUTROPHILS # BLD AUTO: 4.37 X10(3)/MCL (ref 2.1–9.2)
NEUTROPHILS NFR BLD AUTO: 49.8 %
NRBC BLD AUTO-RTO: 0 %
PLATELET # BLD AUTO: 235 X10(3)/MCL (ref 130–400)
PMV BLD AUTO: 10.1 FL (ref 7.4–10.4)
POTASSIUM SERPL-SCNC: 3.6 MMOL/L (ref 3.5–5.1)
PROT SERPL-MCNC: 6.1 GM/DL (ref 5.8–7.6)
RBC # BLD AUTO: 4.2 X10(6)/MCL (ref 4.7–6.1)
SODIUM SERPL-SCNC: 145 MMOL/L (ref 136–145)
WBC # BLD AUTO: 8.79 X10(3)/MCL (ref 4.5–11.5)

## 2024-07-22 PROCEDURE — 99900031 HC PATIENT EDUCATION (STAT)

## 2024-07-22 PROCEDURE — 99900022

## 2024-07-22 PROCEDURE — 36415 COLL VENOUS BLD VENIPUNCTURE: CPT | Performed by: INTERNAL MEDICINE

## 2024-07-22 PROCEDURE — 27200966 HC CLOSED SUCTION SYSTEM

## 2024-07-22 PROCEDURE — 94760 N-INVAS EAR/PLS OXIMETRY 1: CPT

## 2024-07-22 PROCEDURE — 63600175 PHARM REV CODE 636 W HCPCS: Performed by: STUDENT IN AN ORGANIZED HEALTH CARE EDUCATION/TRAINING PROGRAM

## 2024-07-22 PROCEDURE — 63600175 PHARM REV CODE 636 W HCPCS: Performed by: INTERNAL MEDICINE

## 2024-07-22 PROCEDURE — 21400001 HC TELEMETRY ROOM

## 2024-07-22 PROCEDURE — 99900035 HC TECH TIME PER 15 MIN (STAT)

## 2024-07-22 PROCEDURE — 80053 COMPREHEN METABOLIC PANEL: CPT | Performed by: INTERNAL MEDICINE

## 2024-07-22 PROCEDURE — 99900026 HC AIRWAY MAINTENANCE (STAT)

## 2024-07-22 PROCEDURE — 25000003 PHARM REV CODE 250: Performed by: STUDENT IN AN ORGANIZED HEALTH CARE EDUCATION/TRAINING PROGRAM

## 2024-07-22 PROCEDURE — 85025 COMPLETE CBC W/AUTO DIFF WBC: CPT | Performed by: INTERNAL MEDICINE

## 2024-07-22 PROCEDURE — 94761 N-INVAS EAR/PLS OXIMETRY MLT: CPT

## 2024-07-22 PROCEDURE — 25000003 PHARM REV CODE 250: Performed by: INTERNAL MEDICINE

## 2024-07-22 PROCEDURE — 27000221 HC OXYGEN, UP TO 24 HOURS

## 2024-07-22 PROCEDURE — 27000207 HC ISOLATION

## 2024-07-22 PROCEDURE — 63600175 PHARM REV CODE 636 W HCPCS: Performed by: NURSE PRACTITIONER

## 2024-07-22 RX ORDER — SULFAMETHOXAZOLE AND TRIMETHOPRIM 800; 160 MG/1; MG/1
1 TABLET ORAL 2 TIMES DAILY
Status: DISCONTINUED | OUTPATIENT
Start: 2024-07-22 | End: 2024-07-26 | Stop reason: HOSPADM

## 2024-07-22 RX ADMIN — CEFEPIME 1 G: 1 INJECTION, POWDER, FOR SOLUTION INTRAMUSCULAR; INTRAVENOUS at 02:07

## 2024-07-22 RX ADMIN — CHOLESTYRAMINE 4 G: 4 POWDER, FOR SUSPENSION ORAL at 08:07

## 2024-07-22 RX ADMIN — CHOLESTYRAMINE 4 G: 4 POWDER, FOR SUSPENSION ORAL at 11:07

## 2024-07-22 RX ADMIN — CEFEPIME 1 G: 1 INJECTION, POWDER, FOR SOLUTION INTRAMUSCULAR; INTRAVENOUS at 10:07

## 2024-07-22 RX ADMIN — Medication 1 EACH: at 08:07

## 2024-07-22 RX ADMIN — LEVETIRACETAM INJECTION 1500 MG: 15 INJECTION INTRAVENOUS at 11:07

## 2024-07-22 RX ADMIN — AMPICILLIN AND SULBACTAM 3 G: 2; 1 INJECTION, POWDER, FOR SOLUTION INTRAVENOUS at 02:07

## 2024-07-22 RX ADMIN — Medication: at 11:07

## 2024-07-22 RX ADMIN — MUPIROCIN: 20 OINTMENT TOPICAL at 11:07

## 2024-07-22 RX ADMIN — MICONAZOLE NITRATE 2 % TOPICAL POWDER: at 08:07

## 2024-07-22 RX ADMIN — BUSPIRONE HYDROCHLORIDE 5 MG: 5 TABLET ORAL at 08:07

## 2024-07-22 RX ADMIN — DILTIAZEM HYDROCHLORIDE 60 MG: 60 TABLET, FILM COATED ORAL at 07:07

## 2024-07-22 RX ADMIN — BUSPIRONE HYDROCHLORIDE 5 MG: 5 TABLET ORAL at 11:07

## 2024-07-22 RX ADMIN — METOPROLOL TARTRATE 100 MG: 50 TABLET, FILM COATED ORAL at 11:07

## 2024-07-22 RX ADMIN — FINASTERIDE 5 MG: 5 TABLET, FILM COATED ORAL at 08:07

## 2024-07-22 RX ADMIN — RIVAROXABAN 20 MG: 10 TABLET, FILM COATED ORAL at 02:07

## 2024-07-22 RX ADMIN — Medication: at 08:07

## 2024-07-22 RX ADMIN — BACITRACIN ZINC, NEOMYCIN, POLYMYXIN B: 400; 3.5; 5 OINTMENT TOPICAL at 11:07

## 2024-07-22 RX ADMIN — MICONAZOLE NITRATE 2 % TOPICAL POWDER: at 11:07

## 2024-07-22 RX ADMIN — DILTIAZEM HYDROCHLORIDE 60 MG: 60 TABLET, FILM COATED ORAL at 11:07

## 2024-07-22 RX ADMIN — PANTOPRAZOLE SODIUM 40 MG: 40 INJECTION, POWDER, LYOPHILIZED, FOR SOLUTION INTRAVENOUS at 08:07

## 2024-07-22 RX ADMIN — SULFAMETHOXAZOLE AND TRIMETHOPRIM 1 TABLET: 800; 160 TABLET ORAL at 11:07

## 2024-07-22 RX ADMIN — QUETIAPINE FUMARATE 25 MG: 25 TABLET ORAL at 11:07

## 2024-07-22 RX ADMIN — MUPIROCIN: 20 OINTMENT TOPICAL at 08:07

## 2024-07-22 RX ADMIN — DILTIAZEM HYDROCHLORIDE 60 MG: 60 TABLET, FILM COATED ORAL at 05:07

## 2024-07-22 RX ADMIN — DILTIAZEM HYDROCHLORIDE 60 MG: 60 TABLET, FILM COATED ORAL at 12:07

## 2024-07-22 RX ADMIN — BACITRACIN ZINC, NEOMYCIN, POLYMYXIN B: 400; 3.5; 5 OINTMENT TOPICAL at 08:07

## 2024-07-22 RX ADMIN — SULFAMETHOXAZOLE AND TRIMETHOPRIM 1 TABLET: 800; 160 TABLET ORAL at 02:07

## 2024-07-22 RX ADMIN — PANTOPRAZOLE SODIUM 40 MG: 40 INJECTION, POWDER, LYOPHILIZED, FOR SOLUTION INTRAVENOUS at 11:07

## 2024-07-22 RX ADMIN — AMPICILLIN AND SULBACTAM 3 G: 2; 1 INJECTION, POWDER, FOR SOLUTION INTRAVENOUS at 06:07

## 2024-07-22 RX ADMIN — BUSPIRONE HYDROCHLORIDE 5 MG: 5 TABLET ORAL at 02:07

## 2024-07-22 RX ADMIN — LEVETIRACETAM INJECTION 1500 MG: 15 INJECTION INTRAVENOUS at 09:07

## 2024-07-22 RX ADMIN — LOSARTAN POTASSIUM 100 MG: 50 TABLET, FILM COATED ORAL at 08:07

## 2024-07-22 RX ADMIN — METOPROLOL TARTRATE 100 MG: 50 TABLET, FILM COATED ORAL at 08:07

## 2024-07-22 RX ADMIN — QUETIAPINE FUMARATE 25 MG: 25 TABLET ORAL at 08:07

## 2024-07-22 NOTE — PROGRESS NOTES
[facility]  Hospital Medicine Progress Note        Chief Complaint: Inpatient Follow-up    HPI:     68-year-old  male with significant history of PAF on Xarelto, VFib arrest status post AICD in 2018, MCA CVA with hemorrhagic conversion requiring decompressive hemicraniotomy in November of 2023 with residual aphasia, hemiplegia status post tracheostomy, peg tube placement.  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  Patient is a nursing home resident.  High-volume LP scheduled as outpatient on 07/24/2024 by Dr. Thomas to see if  shunt is warranted.  Patient was brought to the ED on 07/18 from the nursing home for questionable dark emesis.  Noted to be in AFib RVR.  CT head with chronic findings.  Lab significant for leukocytosis, bacteriuria.  Mentation at baseline.  Admitted to hospital medicine services for sepsis secondary to UTI versus tracheobronchitis, urine and respiratory cultures collected.  Concern for new onset seizures for which Keppra initiated, seizure precautions.  Urine cultures negative, but respiratory cultures with Gram-negative rods, ceftriaxone switched to cefepime.  Secretions clearing up on 3% neb.     Cefepime was discontinued as seen on Acinetobacter baumannii sensitivities was multi-drug resistant.  Was started on Unasyn and Bactrim    Interval Hx:      Patient seen and examined by bedside.  Awake and alert.  Mentation at baseline.  Does not communicate.  Hemodynamics stable.   Per Nursing, did not have any emesis with his meds this morning.    Objective/physical exam:    General: In no acute distress, afebrile  Chest: Clear to auscultation bilaterally  Heart: S1, S2, no appreciable murmur  Abdomen: Soft, nontender, BS +  MSK: Warm, no lower extremity edema, no clubbing or cyanosis  Neurologic:  Awake, alert    VITAL SIGNS: 24 HRS MIN & MAX LAST    Temp  Min: 98.6 °F (37 °C)  Max: 99.5 °F (37.5 °C) 98.9 °F (37.2 °C)   BP  Min: 120/82  Max: 154/76 120/82   Pulse  Min: 82  Max: 109  82   Resp  Min: 18  Max: 20 18   SpO2  Min: 91 %  Max: 100 % (!) 93 %       Recent Labs   Lab 07/22/24  0551   WBC 8.79   RBC 4.20*   HGB 11.0*   HCT 34.8*   MCV 82.9   MCH 26.2*   MCHC 31.6*   RDW 16.0      MPV 10.1         Recent Labs   Lab 07/18/24  2115 07/19/24  0315 07/20/24  0355 07/21/24  0845 07/22/24  0551   NA  --    < > 142   < > 145   K  --    < > 3.2*   < > 3.6   CL  --    < > 110*   < > 112*   CO2  --    < > 19*   < > 21*   BUN  --    < > 9.9   < > 16.4   CREATININE  --    < > 0.72*   < > 0.71*   CALCIUM  --    < > 9.1   < > 8.7*   PH 7.680*  --   --   --   --    MG  --    < > 2.20  --   --    ALBUMIN  --    < > 3.6   < > 2.9*   ALKPHOS  --    < > 174*   < > 126   ALT  --    < > 23   < > 15   AST  --    < > 23   < > 15   BILITOT  --    < > 0.7   < > 0.7    < > = values in this interval not displayed.          Microbiology Results (last 7 days)       Procedure Component Value Units Date/Time    Respiratory Culture [[id number]]  (Abnormal)  (Susceptibility) Collected: 07/19/24 0258    Order Status: Completed Specimen: Sputum, Expectorated Updated: 07/22/24 1149     Respiratory Culture Many ACINETOBACTER BAUMANNII     Comment: CRAB (Carbapenem-resistant Acinetobacter baumannii)         Many Proteus mirabilis     Comment: with normal respiratory niyah        GRAM STAIN Quality 3+      Moderate Gram negative diplococci      Many Gram Negative Rods      Moderate Gram Positive Rods      Few Gram positive cocci    Urine culture [[id number]]  (Abnormal)  (Susceptibility) Collected: 07/18/24 1648    Order Status: Completed Specimen: Urine Updated: 07/22/24 0710     Urine Culture >/= 100,000 colonies/ml Klebsiella pneumoniae ssp pneumoniae      >/= 100,000 colonies/ml Enterococcus faecalis     Comment: Ampicillin results may be used to predict susceptibility to  amoxicillin-clavulanate, ampicillin-sulbactam, and piperacillin-tazobactam.       Blood Culture #1 **CANNOT BE ORDERED STAT** [[id number]]  (Normal) Collected: 07/18/24 1812    Order Status: Completed Specimen: Blood Updated: 07/21/24 2101     Blood Culture No Growth At 72 Hours    Blood Culture #2 **CANNOT BE ORDERED STAT** [[id number]]  (Normal) Collected: 07/18/24 1812    Order Status: Completed Specimen: Blood Updated: 07/21/24 2101     Blood Culture No Growth At 72 Hours             Scheduled Med:   ampicillin-sulbactam  3 g Intravenous Q6H    busPIRone  5 mg Per G Tube TID    cholestyramine-aspartame  4 g Per G Tube BID    diltiaZEM  60 mg Per G Tube Q6H    finasteride  5 mg Per G Tube Daily    lactated ringers  500 mL Intravenous Once    Lactobacillus rhamnosus GG  1 packet Per G Tube Daily    levETIRAcetam (Keppra) IV (PEDS and ADULTS)  1,500 mg Intravenous Q12H    losartan  100 mg Per G Tube Daily    metoprolol tartrate  100 mg Per G Tube BID    miconazole NITRATE 2 %   Topical (Top) BID    mupirocin   Nasal BID    neomycin-bacitracin-polymyxin   Topical (Top) BID    pantoprazole  40 mg Intravenous Q12H    QUEtiapine  25 mg Per G Tube BID    rivaroxaban  20 mg Per G Tube with dinner    scopolamine  1 patch Transdermal Q3 Days    sulfamethoxazole-trimethoprim 800-160mg  1 tablet Per G Tube BID    zinc oxide-cod liver oil   Topical (Top) BID          Assessment/Plan:    Acute tracheobronchitis secondary to Proteus/Acinetobacter  Acute hypoxemic respiratory failure secondary to above  Sepsis secondary to above  Bacteriuria-likely chronic colonization  New onset seizure disorder  PAF with intermittent RVR  PEG/trach dependent  Peg tube malfunction/tube feeding intolerance  History of VFib arrest status post AICD in 2018  History of MCA CVA with hemorrhagic conversion requiring right frontotemporal decompressive hemicraniotomy, aphasic/left hemiplegic   Ventriculomegaly secondary to suspected  hydrocephalus  History of neuroendocrine cancer of the small bowel status post resection in 2018   HTN   HLD   History of CAD  Prophylaxis      Respiratory cultures-Acinetobacter, Proteus    Sensitivities resulted today, Acinetobacter baumannii is multi-drug resistant   Stop cefepime, start Unasyn and Bactrim mid day 1  Afebrile, hemodynamically stable with no leukocytosis  Continue 3% nebs as needed to clear up secretions  Supplemental oxygen via trach to maintain saturations more than 90% , on 2 L  Urine cultures  positive for Klebsiella pneumoniae and Enterococcus faecalis,  Bactrim and Unasyn should cover  Rate is better controlled today  Keep p.o. Cardizem, p.o. metoprolol tartrate   On p.r.n. IV Cardizem and p.r.n. IV metoprolol   Patient is scheduled to undergo large volume LP on 07/24 with Dr. Thomas ,  we will notify Neurosurgery to see if they want to do it in-house as patient is hospital  Seizures now controlled on Keppra   EEG done findings consistent with prior craniotomy.  No active seizure  In case of recurrence will consult Neurology  Few episodes of diarrhea which improved with Questran, questionable dark emesis prior to transfer from nursing home, no more here and hemoglobin is stable, hold off on GI evaluation  I resumed Xarelto 7/20, tolerating without overt bleeding  Patient having issues with PEG tube feedings, no much residuals, but noted vomiting of PEG feedings   Tube feeds currently on hold.  GI recommends to attempt trickle feeds tomorrow.  Patient is tolerating his meds today  without emesis  Continue other home meds-Seroquel, losartan, finasteride, BuSpar   DVT prophylaxis-on Xarelto    Critical care note:  Critical care diagnosis:  multi-drug resistant respiratory infection  Critical care interventions: Hands-on evaluation, review of labs/radiographs/records and discussion with patient and family if present  Critical care time spent: 35 minutes       Joya Alcocer DO  Department of  [facility]  07/22/2024

## 2024-07-22 NOTE — PLAN OF CARE
Problem: Adult Inpatient Plan of Care  Goal: Plan of Care Review  Outcome: Progressing  Goal: Patient-Specific Goal (Individualized)  Outcome: Progressing  Goal: Absence of Hospital-Acquired Illness or Injury  Outcome: Progressing  Goal: Optimal Comfort and Wellbeing  Outcome: Progressing  Goal: Readiness for Transition of Care  Outcome: Progressing     Problem: Skin Injury Risk Increased  Goal: Skin Health and Integrity  Outcome: Progressing     Problem: Infection  Goal: Absence of Infection Signs and Symptoms  Outcome: Progressing     Problem: Wound  Goal: Optimal Coping  Outcome: Progressing  Goal: Optimal Functional Ability  Outcome: Progressing  Goal: Absence of Infection Signs and Symptoms  Outcome: Progressing  Goal: Optimal Pain Control and Function  Outcome: Progressing  Goal: Skin Health and Integrity  Outcome: Progressing  Goal: Optimal Wound Healing  Outcome: Progressing     Problem: Sepsis/Septic Shock  Goal: Absence of Infection Signs and Symptoms  Outcome: Progressing     Problem: Fall Injury Risk  Goal: Absence of Fall and Fall-Related Injury  Outcome: Progressing     Problem: Wound  Goal: Improved Oral Intake  Outcome: Not Progressing     Problem: Sepsis/Septic Shock  Goal: Optimal Nutrition Intake  Outcome: Not Progressing

## 2024-07-22 NOTE — PROGRESS NOTES
"Gastroenterology Progress Note    Subjective/Interval History:  Spoke with nurse- Tfs have been held. No residuals. He did vomit yesterday with meds, TF appearance but the Tfs were held and he had no residuals prior. Has not have any emesis today and tolerating meds so far. BM 7/20.    ROS:  Review of Systems   Unable to perform ROS: Patient nonverbal       Vital Signs:  /82   Pulse 82   Temp 98.9 °F (37.2 °C) (Oral)   Resp 18   Ht 5' 10" (1.778 m)   Wt 117.9 kg (260 lb)   SpO2 (!) 93%   BMI 37.31 kg/m²   Body mass index is 37.31 kg/m².    Physical Exam:  Physical Exam  Constitutional:       Appearance: He is obese. He is ill-appearing and toxic-appearing.   HENT:      Head: Normocephalic and atraumatic.      Mouth/Throat:      Mouth: Mucous membranes are dry.      Pharynx: Oropharynx is clear.   Cardiovascular:      Rate and Rhythm: Normal rate and regular rhythm.      Pulses: Normal pulses.      Heart sounds: Normal heart sounds.   Pulmonary:      Effort: Pulmonary effort is normal. No respiratory distress.      Breath sounds: Normal breath sounds. No stridor. No wheezing or rales.      Comments: Trach  Abdominal:      General: Bowel sounds are normal. There is no distension.      Palpations: There is no mass.      Tenderness: There is no abdominal tenderness. There is no guarding.      Comments: Peg tube epigastrium/LUQ with external bumper at 8 cm.    Musculoskeletal:      Right lower leg: No edema.      Left lower leg: No edema.   Skin:     General: Skin is warm and dry.   Neurological:      Comments: Unable to assess   Psychiatric:      Comments: Unable to assess         Labs:  Recent Results (from the past 48 hour(s))   Comprehensive Metabolic Panel    Collection Time: 07/21/24  8:45 AM   Result Value Ref Range    Sodium 142 136 - 145 mmol/L    Potassium 4.5 3.5 - 5.1 mmol/L    Chloride 112 (H) 98 - 107 mmol/L    CO2 20 (L) 23 - 31 mmol/L    Glucose 102 82 - 115 mg/dL    Blood Urea Nitrogen 20.3 " 8.4 - 25.7 mg/dL    Creatinine 0.70 (L) 0.73 - 1.18 mg/dL    Calcium 7.9 (L) 8.8 - 10.0 mg/dL    Protein Total 6.5 5.8 - 7.6 gm/dL    Albumin 3.1 (L) 3.4 - 4.8 g/dL    Globulin 3.4 2.4 - 3.5 gm/dL    Albumin/Globulin Ratio 0.9 (L) 1.1 - 2.0 ratio    Bilirubin Total 0.7 <=1.5 mg/dL     40 - 150 unit/L    ALT 18 0 - 55 unit/L    AST 20 5 - 34 unit/L    eGFR >60 mL/min/1.73/m2    Anion Gap 10.0 mEq/L    BUN/Creatinine Ratio 29    CBC with Differential    Collection Time: 07/21/24  8:45 AM   Result Value Ref Range    WBC 9.44 4.50 - 11.50 x10(3)/mcL    RBC 4.49 (L) 4.70 - 6.10 x10(6)/mcL    Hgb 12.0 (L) 14.0 - 18.0 g/dL    Hct 38.6 (L) 42.0 - 52.0 %    MCV 86.0 80.0 - 94.0 fL    MCH 26.7 (L) 27.0 - 31.0 pg    MCHC 31.1 (L) 33.0 - 36.0 g/dL    RDW 15.9 11.5 - 17.0 %    Platelet 246 130 - 400 x10(3)/mcL    MPV 9.8 7.4 - 10.4 fL    Neut % 62.9 %    Lymph % 19.7 %    Mono % 12.4 %    Eos % 3.6 %    Basophil % 1.1 %    Lymph # 1.86 0.6 - 4.6 x10(3)/mcL    Neut # 5.94 2.1 - 9.2 x10(3)/mcL    Mono # 1.17 0.1 - 1.3 x10(3)/mcL    Eos # 0.34 0 - 0.9 x10(3)/mcL    Baso # 0.10 <=0.2 x10(3)/mcL    IG# 0.03 0 - 0.04 x10(3)/mcL    IG% 0.3 %    NRBC% 0.0 %   Comprehensive Metabolic Panel    Collection Time: 07/22/24  5:51 AM   Result Value Ref Range    Sodium 145 136 - 145 mmol/L    Potassium 3.6 3.5 - 5.1 mmol/L    Chloride 112 (H) 98 - 107 mmol/L    CO2 21 (L) 23 - 31 mmol/L    Glucose 90 82 - 115 mg/dL    Blood Urea Nitrogen 16.4 8.4 - 25.7 mg/dL    Creatinine 0.71 (L) 0.73 - 1.18 mg/dL    Calcium 8.7 (L) 8.8 - 10.0 mg/dL    Protein Total 6.1 5.8 - 7.6 gm/dL    Albumin 2.9 (L) 3.4 - 4.8 g/dL    Globulin 3.2 2.4 - 3.5 gm/dL    Albumin/Globulin Ratio 0.9 (L) 1.1 - 2.0 ratio    Bilirubin Total 0.7 <=1.5 mg/dL     40 - 150 unit/L    ALT 15 0 - 55 unit/L    AST 15 5 - 34 unit/L    eGFR >60 mL/min/1.73/m2    Anion Gap 12.0 mEq/L    BUN/Creatinine Ratio 23    CBC with Differential    Collection Time: 07/22/24  5:51 AM    Result Value Ref Range    WBC 8.79 4.50 - 11.50 x10(3)/mcL    RBC 4.20 (L) 4.70 - 6.10 x10(6)/mcL    Hgb 11.0 (L) 14.0 - 18.0 g/dL    Hct 34.8 (L) 42.0 - 52.0 %    MCV 82.9 80.0 - 94.0 fL    MCH 26.2 (L) 27.0 - 31.0 pg    MCHC 31.6 (L) 33.0 - 36.0 g/dL    RDW 16.0 11.5 - 17.0 %    Platelet 235 130 - 400 x10(3)/mcL    MPV 10.1 7.4 - 10.4 fL    Neut % 49.8 %    Lymph % 27.2 %    Mono % 13.9 %    Eos % 7.8 %    Basophil % 1.1 %    Lymph # 2.39 0.6 - 4.6 x10(3)/mcL    Neut # 4.37 2.1 - 9.2 x10(3)/mcL    Mono # 1.22 0.1 - 1.3 x10(3)/mcL    Eos # 0.69 0 - 0.9 x10(3)/mcL    Baso # 0.10 <=0.2 x10(3)/mcL    IG# 0.02 0 - 0.04 x10(3)/mcL    IG% 0.2 %    NRBC% 0.0 %       Imaging:  XR Gastric tube check, non-radiologist performed    Result Date: 7/21/2024  EXAMINATION: XR GASTRIC TUBE CHECK, NON-RADIOLOGIST PERFORMED CLINICAL HISTORY: not tolerating tube feeds via PEG, resistance when attempting to tighten external bumper; TECHNIQUE: One view COMPARISON: January 20, 2024 FINDINGS: Contrast instilled through the gastrostomy tube partially opacifies the stomach.  There is no contrast extravasation.     Gastrostomy tube terminates within the stomach. Electronically signed by: Sami Taylor Date:    07/21/2024 Time:    15:39    X-Ray Chest 1 View    Result Date: 7/21/2024  EXAMINATION: XR CHEST 1 VIEW CLINICAL HISTORY: sob; TECHNIQUE: Single frontal view of the chest was performed. COMPARISON: 07/18/2024 FINDINGS: LINES AND TUBES: Tracheostomy cannula projects over the trachea with tip at the level of the clavicular heads.  Dual lead cardiac pacer device is in place via right subclavian approach with leads overlying the right atrium and right ventricle. MEDIASTINUM AND MARJORIE: Cardiac silhouette is enlarged.  Aortic atherosclerosis. LUNGS: No lobar consolidation. No edema. PLEURA:No pleural effusion. No pneumothorax. OTHER: No acute osseous abnormality.     Enlarged cardiac silhouette without edema Electronically signed  by: Laureen Christina Date:    07/21/2024 Time:    12:08    CT Chest Abdomen Pelvis With IV Contrast (XPD) NO Oral Contrast    Result Date: 7/19/2024  EXAMINATION: CT CHEST ABDOMEN PELVIS WITH IV CONTRAST (XPD) CLINICAL HISTORY: absent bowel sounds/Vomiting; TECHNIQUE: Low dose axial images, sagittal and coronal reformations were obtained from the thoracic inlet to the pubic symphysis following the IV contrast administration. Automatic exposure control is utilized to reduce patient radiation exposure. COMPARISON: None FINDINGS: The lungs are adequately aerated.  No pneumothorax is seen.  No pulmonary contusion is seen.  No pleural effusion is seen.  No infiltrate is seen. The thoracic aorta is normal in caliber.  No dissection or aneurysm is seen.  No retrosternal hematoma is seen. The abdominal aorta appears grossly unremarkable.  No dissection or posttraumatic changes are seen. The heart appears normal. The esophagus is fluid-filled and dilated. There is a gastrostomy tube in the stomach. The liver appears normal.  No liver mass or lesion is seen.  Portal and hepatic veins appear normal. The gallbladder appears normal.  No gallstones are seen.. The spleen appears normal.  No splenic mass is seen..  The pancreas appears grossly unremarkable.  No pancreatic mass or lesion is seen.  No inflammation is seen. No adrenal abnormality is seen.  No adrenal nodule is seen. The kidneys are well perfused.  There is some cysts seen in both kidneys.  No hydronephrosis is seen.  No hydroureter is seen.  No retroperitoneal hematoma is seen. Visualized portions of the bowel shows no acute abnormality.  No colitis is seen.  No diverticulitis is seen.  No colonic mass is seen. No free air is seen.  No free fluid is seen. The urinary bladder is decompressed due to Fernandez catheter but there are some inflammatory changes and wall thickening seen in the urinary bladder concerning for cystitis.. Dysplastic changes seen in the left hip with  significant amount heterotrophic bone formation seen.  These findings appear to be chronic.     Dilated fluid-filled esophagus.  Findings are consistent with gastroesophageal reflux Urinary bladder wall thickening and inflammatory changes seen concerning for cystitis Electronically signed by: Erick Grant Date:    07/19/2024 Time:    10:59    CT Head Without Contrast    Result Date: 7/19/2024  Technique: CT of the head was performed without intravenous contrast with direct axial as well as coronal and sagittal reconstruction images. Comparison: Comparison is with study dated 2024-05-15 10:08:14. Clinical history: Mental status change, unknown cause; vomiting with hx of ventriculomnegaly. FINDINGS: There is no significant change in the large area of encephalomalacia involving the right cerebrum. There is likewise no apparent change in the degree of outward protrusion of the cerebral parenchyma through the wide right sided craniotomy defect. The degree of dilatation of the lateral ventricles remains stable. There are no new hypodensities to suggest an acute infarct. No acute intracranial hemorrhage is seen. CSF spaces: Subarachnoid space: Within normal limits. Brain parenchyma: There is no acute large vessel territory infarct. Calvarium: Post operative wide craniotomy in the right is again noted. Maxillofacial Structures: Paranasal sinuses: The visualized paranasal sinuses appear clear with no significant mucoperiosteal thickening or air fluid levels identified.     Impression: 1. There is no significant change in the large area of encephalomalacia involving the right cerebrum. There is likewise no apparent change in the degree of outward protrusion of the cerebral parenchyma through the wide right sided craniotomy defect. The degree of dilatation of the lateral ventricles remains stable. There are no new hypodensities to suggest an acute infarct. No acute intracranial hemorrhage is seen. 2. Details and findings as  noted above. No significant discrepancy with overnight report. Electronically signed by: Sami Taylor Date:    07/19/2024 Time:    07:26    X-Ray Chest AP Portable    Result Date: 7/18/2024  EXAMINATION: XR CHEST AP PORTABLE CLINICAL HISTORY: Gastric contents in respiratory tract, part unspecified causing other injury, initial encounter COMPARISON: 15 May 2024 FINDINGS: Frontal view of the chest was obtained. Tracheostomy remains.  There is right-sided AICD.  Stable cardiomegaly.  Lungs are grossly clear.  No pneumothorax.     No acute findings. Electronically signed by: Tani Calderon Date:    07/18/2024 Time:    16:42         Assessment/Plan:  68 y.o. male known to Dr. Escalona from inpatient care (primary GI is Dr. 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  EKG on admit showed AFib with RVR.  Given metoprolol and started on IV antibiotics for UTI.  Admitted for further care.  Patient is not tolerating tube feeds today therefore GI was consulted.     Reflux of tube feeds  Tfs currently held- no residuals per nurse and no vomiting today.   -Continue to hold, ok for meds if tolerated. Continue TPN.  - If no further vomiting and low/no residuals, ok to attempt trickle feeds tomorrow.  - keep HOB elevated at least 30 degrees to prevent aspiration  - continue PPI     Thank you for allowing us to participate in this patient's care.  Mally Gautam Np acting as scribe for Dr. Sergio Francois MD

## 2024-07-22 NOTE — PLAN OF CARE
Problem: Adult Inpatient Plan of Care  Goal: Plan of Care Review  Outcome: Progressing  Goal: Patient-Specific Goal (Individualized)  Outcome: Progressing  Goal: Absence of Hospital-Acquired Illness or Injury  Outcome: Progressing  Goal: Optimal Comfort and Wellbeing  Outcome: Progressing  Goal: Readiness for Transition of Care  Outcome: Progressing     Problem: Skin Injury Risk Increased  Goal: Skin Health and Integrity  Outcome: Progressing     Problem: Infection  Goal: Absence of Infection Signs and Symptoms  Outcome: Progressing     Problem: Wound  Goal: Optimal Coping  Outcome: Progressing  Goal: Optimal Functional Ability  Outcome: Progressing  Goal: Absence of Infection Signs and Symptoms  Outcome: Progressing  Goal: Improved Oral Intake  Outcome: Progressing  Goal: Optimal Pain Control and Function  Outcome: Progressing  Goal: Skin Health and Integrity  Outcome: Progressing  Goal: Optimal Wound Healing  Outcome: Progressing     Problem: Sepsis/Septic Shock  Goal: Absence of Infection Signs and Symptoms  Outcome: Progressing  Goal: Optimal Nutrition Intake  Outcome: Progressing     Problem: Fall Injury Risk  Goal: Absence of Fall and Fall-Related Injury  Outcome: Progressing

## 2024-07-23 LAB
ANION GAP SERPL CALC-SCNC: 11 MEQ/L
BACTERIA BLD CULT: NORMAL
BACTERIA BLD CULT: NORMAL
BUN SERPL-MCNC: 12.7 MG/DL (ref 8.4–25.7)
CALCIUM SERPL-MCNC: 8.8 MG/DL (ref 8.8–10)
CHLORIDE SERPL-SCNC: 111 MMOL/L (ref 98–107)
CO2 SERPL-SCNC: 23 MMOL/L (ref 23–31)
CREAT SERPL-MCNC: 0.75 MG/DL (ref 0.73–1.18)
CREAT/UREA NIT SERPL: 17
ERYTHROCYTE [DISTWIDTH] IN BLOOD BY AUTOMATED COUNT: 15.8 % (ref 11.5–17)
GFR SERPLBLD CREATININE-BSD FMLA CKD-EPI: >60 ML/MIN/1.73/M2
GLUCOSE SERPL-MCNC: 78 MG/DL (ref 82–115)
HCT VFR BLD AUTO: 35 % (ref 42–52)
HGB BLD-MCNC: 11 G/DL (ref 14–18)
MCH RBC QN AUTO: 26.3 PG (ref 27–31)
MCHC RBC AUTO-ENTMCNC: 31.4 G/DL (ref 33–36)
MCV RBC AUTO: 83.5 FL (ref 80–94)
NRBC BLD AUTO-RTO: 0 %
PLATELET # BLD AUTO: 268 X10(3)/MCL (ref 130–400)
PMV BLD AUTO: 10 FL (ref 7.4–10.4)
POTASSIUM SERPL-SCNC: 3.2 MMOL/L (ref 3.5–5.1)
RBC # BLD AUTO: 4.19 X10(6)/MCL (ref 4.7–6.1)
SODIUM SERPL-SCNC: 145 MMOL/L (ref 136–145)
WBC # BLD AUTO: 6.95 X10(3)/MCL (ref 4.5–11.5)

## 2024-07-23 PROCEDURE — 36415 COLL VENOUS BLD VENIPUNCTURE: CPT | Performed by: STUDENT IN AN ORGANIZED HEALTH CARE EDUCATION/TRAINING PROGRAM

## 2024-07-23 PROCEDURE — 99900022

## 2024-07-23 PROCEDURE — 85027 COMPLETE CBC AUTOMATED: CPT | Performed by: STUDENT IN AN ORGANIZED HEALTH CARE EDUCATION/TRAINING PROGRAM

## 2024-07-23 PROCEDURE — 80048 BASIC METABOLIC PNL TOTAL CA: CPT | Performed by: STUDENT IN AN ORGANIZED HEALTH CARE EDUCATION/TRAINING PROGRAM

## 2024-07-23 PROCEDURE — 63600175 PHARM REV CODE 636 W HCPCS: Performed by: STUDENT IN AN ORGANIZED HEALTH CARE EDUCATION/TRAINING PROGRAM

## 2024-07-23 PROCEDURE — 63600175 PHARM REV CODE 636 W HCPCS: Performed by: INTERNAL MEDICINE

## 2024-07-23 PROCEDURE — 63600175 PHARM REV CODE 636 W HCPCS: Performed by: NURSE PRACTITIONER

## 2024-07-23 PROCEDURE — 27000207 HC ISOLATION

## 2024-07-23 PROCEDURE — 94760 N-INVAS EAR/PLS OXIMETRY 1: CPT

## 2024-07-23 PROCEDURE — 25000003 PHARM REV CODE 250: Performed by: STUDENT IN AN ORGANIZED HEALTH CARE EDUCATION/TRAINING PROGRAM

## 2024-07-23 PROCEDURE — 94761 N-INVAS EAR/PLS OXIMETRY MLT: CPT

## 2024-07-23 PROCEDURE — 99900035 HC TECH TIME PER 15 MIN (STAT)

## 2024-07-23 PROCEDURE — 25000003 PHARM REV CODE 250: Performed by: INTERNAL MEDICINE

## 2024-07-23 PROCEDURE — 21400001 HC TELEMETRY ROOM

## 2024-07-23 PROCEDURE — 99900031 HC PATIENT EDUCATION (STAT)

## 2024-07-23 PROCEDURE — 27000221 HC OXYGEN, UP TO 24 HOURS

## 2024-07-23 PROCEDURE — 99900026 HC AIRWAY MAINTENANCE (STAT)

## 2024-07-23 RX ADMIN — LEUCINE, PHENYLALANINE, LYSINE, METHIONINE, ISOLEUCINE, VALINE, HISTIDINE, THREONINE, TRYPTOPHAN, ALANINE, GLYCINE, ARGININE, PROLINE, SERINE, TYROSINE, DEXTROSE: 311; 238; 247; 170; 255; 247; 204; 179; 77; 880; 438; 489; 289; 213; 17; 5 INJECTION INTRAVENOUS at 05:07

## 2024-07-23 RX ADMIN — METOPROLOL TARTRATE 100 MG: 50 TABLET, FILM COATED ORAL at 08:07

## 2024-07-23 RX ADMIN — PANTOPRAZOLE SODIUM 40 MG: 40 INJECTION, POWDER, LYOPHILIZED, FOR SOLUTION INTRAVENOUS at 08:07

## 2024-07-23 RX ADMIN — CHOLESTYRAMINE 4 G: 4 POWDER, FOR SUSPENSION ORAL at 08:07

## 2024-07-23 RX ADMIN — SULFAMETHOXAZOLE AND TRIMETHOPRIM 1 TABLET: 800; 160 TABLET ORAL at 08:07

## 2024-07-23 RX ADMIN — Medication 1 EACH: at 08:07

## 2024-07-23 RX ADMIN — BACITRACIN ZINC, NEOMYCIN, POLYMYXIN B: 400; 3.5; 5 OINTMENT TOPICAL at 10:07

## 2024-07-23 RX ADMIN — BUSPIRONE HYDROCHLORIDE 5 MG: 5 TABLET ORAL at 02:07

## 2024-07-23 RX ADMIN — MICONAZOLE NITRATE 2 % TOPICAL POWDER: at 08:07

## 2024-07-23 RX ADMIN — BACITRACIN ZINC, NEOMYCIN, POLYMYXIN B: 400; 3.5; 5 OINTMENT TOPICAL at 08:07

## 2024-07-23 RX ADMIN — Medication: at 10:07

## 2024-07-23 RX ADMIN — FINASTERIDE 5 MG: 5 TABLET, FILM COATED ORAL at 08:07

## 2024-07-23 RX ADMIN — MICONAZOLE NITRATE 2 % TOPICAL POWDER: at 09:07

## 2024-07-23 RX ADMIN — SULFAMETHOXAZOLE AND TRIMETHOPRIM 1 TABLET: 800; 160 TABLET ORAL at 09:07

## 2024-07-23 RX ADMIN — CHOLESTYRAMINE 4 G: 4 POWDER, FOR SUSPENSION ORAL at 09:07

## 2024-07-23 RX ADMIN — BUSPIRONE HYDROCHLORIDE 5 MG: 5 TABLET ORAL at 08:07

## 2024-07-23 RX ADMIN — Medication: at 08:07

## 2024-07-23 RX ADMIN — DILTIAZEM HYDROCHLORIDE 60 MG: 60 TABLET, FILM COATED ORAL at 07:07

## 2024-07-23 RX ADMIN — AMPICILLIN SODIUM AND SULBACTAM SODIUM 3 G: 2; 1 INJECTION, POWDER, FOR SOLUTION INTRAMUSCULAR; INTRAVENOUS at 10:07

## 2024-07-23 RX ADMIN — QUETIAPINE FUMARATE 25 MG: 25 TABLET ORAL at 09:07

## 2024-07-23 RX ADMIN — DILTIAZEM HYDROCHLORIDE 60 MG: 60 TABLET, FILM COATED ORAL at 06:07

## 2024-07-23 RX ADMIN — POTASSIUM BICARBONATE 40 MEQ: 391 TABLET, EFFERVESCENT ORAL at 10:07

## 2024-07-23 RX ADMIN — RIVAROXABAN 20 MG: 10 TABLET, FILM COATED ORAL at 06:07

## 2024-07-23 RX ADMIN — DILTIAZEM HYDROCHLORIDE 60 MG: 60 TABLET, FILM COATED ORAL at 12:07

## 2024-07-23 RX ADMIN — LEVETIRACETAM INJECTION 1500 MG: 15 INJECTION INTRAVENOUS at 09:07

## 2024-07-23 RX ADMIN — MUPIROCIN: 20 OINTMENT TOPICAL at 08:07

## 2024-07-23 RX ADMIN — QUETIAPINE FUMARATE 25 MG: 25 TABLET ORAL at 08:07

## 2024-07-23 RX ADMIN — AMPICILLIN SODIUM AND SULBACTAM SODIUM 3 G: 2; 1 INJECTION, POWDER, FOR SOLUTION INTRAMUSCULAR; INTRAVENOUS at 02:07

## 2024-07-23 RX ADMIN — BUSPIRONE HYDROCHLORIDE 5 MG: 5 TABLET ORAL at 09:07

## 2024-07-23 RX ADMIN — PANTOPRAZOLE SODIUM 40 MG: 40 INJECTION, POWDER, LYOPHILIZED, FOR SOLUTION INTRAVENOUS at 09:07

## 2024-07-23 RX ADMIN — AMPICILLIN SODIUM AND SULBACTAM SODIUM 3 G: 2; 1 INJECTION, POWDER, FOR SOLUTION INTRAMUSCULAR; INTRAVENOUS at 09:07

## 2024-07-23 RX ADMIN — LOSARTAN POTASSIUM 100 MG: 50 TABLET, FILM COATED ORAL at 08:07

## 2024-07-23 RX ADMIN — METOPROLOL TARTRATE 100 MG: 50 TABLET, FILM COATED ORAL at 09:07

## 2024-07-23 RX ADMIN — AMPICILLIN AND SULBACTAM 3 G: 2; 1 INJECTION, POWDER, FOR SOLUTION INTRAVENOUS at 01:07

## 2024-07-23 NOTE — PROGRESS NOTES
[facility] Hospital Medicine Progress Note        Chief Complaint: Inpatient Follow-up    HPI:     68-year-old  male with significant history of PAF on Xarelto, VFib arrest status post AICD in 2018, MCA CVA with hemorrhagic conversion requiring decompressive hemicraniotomy in November of 2023 with residual aphasia, hemiplegia status post tracheostomy, peg tube placement.  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  Patient is a nursing home resident.  High-volume LP scheduled as outpatient on 07/24/2024 by Dr. Thomas to see if  shunt is warranted.  Patient was brought to the ED on 07/18 from the nursing home for questionable dark emesis.  Noted to be in AFib RVR.  CT head with chronic findings.  Lab significant for leukocytosis, bacteriuria.  Mentation at baseline.  Admitted to hospital medicine services for sepsis secondary to UTI versus tracheobronchitis, urine and respiratory cultures collected.  Concern for new onset seizures for which Keppra initiated, seizure precautions.  Urine cultures negative, but respiratory cultures with Gram-negative rods, ceftriaxone switched to cefepime.  Secretions clearing up on 3% neb.     Cefepime was discontinued as seen on Acinetobacter baumannii sensitivities was multi-drug resistant.  Was started on Unasyn and Bactrim    Interval Hx:      Patient seen and examined by bedside.  Mentation at   Baseline.  No further vomiting noted per nursing.    Objective/physical exam:    General: In no acute distress, afebrile  Chest: Clear to auscultation bilaterally  Heart: S1, S2, no appreciable murmur  Abdomen: Soft, nontender, BS +  MSK: Warm, no lower extremity edema, no clubbing or cyanosis  Neurologic:  Awake, alert    VITAL SIGNS: 24 HRS MIN & MAX LAST   Temp  Min: 97 °F (36.1 °C)  Max: 98.7 °F (37.1 °C) 98 °F (36.7 °C)   BP  Min: 106/70   Max: 149/87 (!) 149/87   Pulse  Min: 78  Max: 87  78   Resp  Min: 18  Max: 18 18   SpO2  Min: 95 %  Max: 100 % 95 %       Recent Labs   Lab 07/23/24 0354   WBC 6.95   RBC 4.19*   HGB 11.0*   HCT 35.0*   MCV 83.5   MCH 26.3*   MCHC 31.4*   RDW 15.8      MPV 10.0         Recent Labs   Lab 07/18/24 2115 07/19/24 0315 07/20/24 0355 07/21/24  0845 07/22/24  0551 07/23/24 0354   NA  --    < > 142   < > 145 145   K  --    < > 3.2*   < > 3.6 3.2*   CL  --    < > 110*   < > 112* 111*   CO2  --    < > 19*   < > 21* 23   BUN  --    < > 9.9   < > 16.4 12.7   CREATININE  --    < > 0.72*   < > 0.71* 0.75   CALCIUM  --    < > 9.1   < > 8.7* 8.8   PH 7.680*  --   --   --   --   --    MG  --    < > 2.20  --   --   --    ALBUMIN  --    < > 3.6   < > 2.9*  --    ALKPHOS  --    < > 174*   < > 126  --    ALT  --    < > 23   < > 15  --    AST  --    < > 23   < > 15  --    BILITOT  --    < > 0.7   < > 0.7  --     < > = values in this interval not displayed.          Microbiology Results (last 7 days)       Procedure Component Value Units Date/Time    Blood Culture #1 **CANNOT BE ORDERED STAT** [[id number]]  (Normal) Collected: 07/18/24 1812    Order Status: Completed Specimen: Blood Updated: 07/22/24 2100     Blood Culture No Growth At 96 Hours    Blood Culture #2 **CANNOT BE ORDERED STAT** [[id number]]  (Normal) Collected: 07/18/24 1812    Order Status: Completed Specimen: Blood Updated: 07/22/24 2100     Blood Culture No Growth At 96 Hours    Respiratory Culture [[id number]]  (Abnormal)  (Susceptibility) Collected: 07/19/24 0258    Order Status: Completed Specimen: Sputum, Expectorated Updated: 07/22/24 1149     Respiratory Culture Many ACINETOBACTER BAUMANNII     Comment: CRAB (Carbapenem-resistant Acinetobacter baumannii)         Many Proteus mirabilis     Comment: with normal respiratory niyah        GRAM STAIN Quality 3+      Moderate Gram negative diplococci      Many Gram Negative Rods      Moderate Gram Positive Rods       Few Gram positive cocci    Urine culture [[id number]]  (Abnormal)  (Susceptibility) Collected: 07/18/24 1648    Order Status: Completed Specimen: Urine Updated: 07/22/24 0710     Urine Culture >/= 100,000 colonies/ml Klebsiella pneumoniae ssp pneumoniae      >/= 100,000 colonies/ml Enterococcus faecalis     Comment: Ampicillin results may be used to predict susceptibility to amoxicillin-clavulanate, ampicillin-sulbactam, and piperacillin-tazobactam.                Scheduled Med:   ampicillin-sulbactam  3 g Intravenous Q6H    busPIRone  5 mg Per G Tube TID    cholestyramine-aspartame  4 g Per G Tube BID    diltiaZEM  60 mg Per G Tube Q6H    finasteride  5 mg Per G Tube Daily    lactated ringers  500 mL Intravenous Once    Lactobacillus rhamnosus GG  1 packet Per G Tube Daily    levETIRAcetam (Keppra) IV (PEDS and ADULTS)  1,500 mg Intravenous Q12H    losartan  100 mg Per G Tube Daily    metoprolol tartrate  100 mg Per G Tube BID    miconazole NITRATE 2 %   Topical (Top) BID    neomycin-bacitracin-polymyxin   Topical (Top) BID    pantoprazole  40 mg Intravenous Q12H    QUEtiapine  25 mg Per G Tube BID    rivaroxaban  20 mg Per G Tube with dinner    scopolamine  1 patch Transdermal Q3 Days    sulfamethoxazole-trimethoprim 800-160mg  1 tablet Per G Tube BID    zinc oxide-cod liver oil   Topical (Top) BID          Assessment/Plan:    Acute tracheobronchitis secondary to Proteus/Acinetobacter  Acute hypoxemic respiratory failure secondary to above  Sepsis secondary to above  Bacteriuria-likely chronic colonization  New onset seizure disorder  PAF with intermittent RVR  PEG/trach dependent  Peg tube malfunction/tube feeding intolerance  History of VFib arrest status post AICD in 2018  History of MCA CVA with hemorrhagic conversion requiring right frontotemporal decompressive hemicraniotomy, aphasic/left hemiplegic   Ventriculomegaly secondary to suspected hydrocephalus  History of neuroendocrine cancer of the small  bowel status post resection in 2018   HTN   HLD   History of CAD  Prophylaxis      Respiratory cultures-Acinetobacter, Proteus    Sensitivities resulted today, Acinetobacter baumannii is multi-drug resistant   Stop cefepime, start Unasyn and Bactrim,  day 2  Afebrile, hemodynamically stable with no leukocytosis  Continue 3% nebs as needed to clear up secretions  Supplemental oxygen via trach to maintain saturations more than 90% , on 2 L  Urine cultures  positive for Klebsiella pneumoniae and Enterococcus faecalis,  Bactrim and Unasyn should cover  Keep p.o. Cardizem, p.o. metoprolol tartrate   On p.r.n. IV Cardizem and p.r.n. IV metoprolol   Patient is scheduled to undergo large volume LP on 07/24 with Dr. Thomas ,   Neurosurgery was consulted and plan to see tomorrow  Seizures now controlled on Keppra   EEG done findings consistent with prior craniotomy.  No active seizure  In case of recurrence will consult Neurology  Few episodes of diarrhea which improved with Questran, questionable dark emesis prior to transfer from nursing home, no more here and hemoglobin is stable, hold off on GI evaluation  I resumed Xarelto 7/20, tolerating without overt bleeding  Patient having issues with PEG tube feedings, no much residuals, but noted vomiting of PEG feedings   Tube feeds currently on hold.   No further vomiting noted.  Attempt to resume tube feeds today  Continue other home meds-Seroquel, losartan, finasteride, BuSpar   DVT prophylaxis-on Xarelto    Critical care note:  Critical care diagnosis:  multi-drug resistant respiratory infection  Critical care interventions: Hands-on evaluation, review of labs/radiographs/records and discussion with patient and family if present  Critical care time spent: 35 minutes       Joya Alcocer DO  Department of Hospital Medicine  [facility]  07/23/2024

## 2024-07-23 NOTE — PROGRESS NOTES
Care update:    CT head without contrast with Abdi [facility] protocol has been ordered.  0 LG has protocol but protocol has also been scanned into the media file for reference.    Dr. Thomas to evaluate patient tomorrow and provide further recommendations.

## 2024-07-23 NOTE — PROGRESS NOTES
"Gastroenterology Progress Note    Subjective/Interval History:  Patient more alert today. Tfs have been held- no further vomiting x 2 days and tolerating medications.  No residuals. He is having soft brown liquid bowel movements.     ROS:  Review of Systems   Unable to perform ROS: Patient nonverbal       Vital Signs:  BP (!) 149/87   Pulse 78   Temp 98 °F (36.7 °C)   Resp 18   Ht 5' 10" (1.778 m)   Wt 117.9 kg (260 lb)   SpO2 95%   BMI 37.31 kg/m²   Body mass index is 37.31 kg/m².    Physical Exam:  Physical Exam  Constitutional:       Appearance: He is obese. He is ill-appearing and toxic-appearing.   HENT:      Head: Normocephalic and atraumatic.      Mouth/Throat:      Mouth: Mucous membranes are dry.      Pharynx: Oropharynx is clear.   Cardiovascular:      Rate and Rhythm: Normal rate and regular rhythm.      Pulses: Normal pulses.      Heart sounds: Normal heart sounds.   Pulmonary:      Effort: Pulmonary effort is normal. No respiratory distress.      Breath sounds: Normal breath sounds. No stridor. No wheezing or rales.      Comments: Trach  Abdominal:      General: Bowel sounds are normal. There is no distension.      Palpations: There is no mass.      Tenderness: There is no abdominal tenderness. There is no guarding.      Comments: Peg tube epigastrium/LUQ with external bumper at 8 cm.    Musculoskeletal:      Right lower leg: No edema.      Left lower leg: No edema.   Skin:     General: Skin is warm and dry.   Neurological:      Comments: Unable to assess   Psychiatric:      Comments: Unable to assess   Labs:  Recent Results (from the past 48 hour(s))   Comprehensive Metabolic Panel    Collection Time: 07/22/24  5:51 AM   Result Value Ref Range    Sodium 145 136 - 145 mmol/L    Potassium 3.6 3.5 - 5.1 mmol/L    Chloride 112 (H) 98 - 107 mmol/L    CO2 21 (L) 23 - 31 mmol/L    Glucose 90 82 - 115 mg/dL    Blood Urea Nitrogen 16.4 8.4 - 25.7 mg/dL    Creatinine 0.71 (L) 0.73 - 1.18 mg/dL    Calcium " 8.7 (L) 8.8 - 10.0 mg/dL    Protein Total 6.1 5.8 - 7.6 gm/dL    Albumin 2.9 (L) 3.4 - 4.8 g/dL    Globulin 3.2 2.4 - 3.5 gm/dL    Albumin/Globulin Ratio 0.9 (L) 1.1 - 2.0 ratio    Bilirubin Total 0.7 <=1.5 mg/dL     40 - 150 unit/L    ALT 15 0 - 55 unit/L    AST 15 5 - 34 unit/L    eGFR >60 mL/min/1.73/m2    Anion Gap 12.0 mEq/L    BUN/Creatinine Ratio 23    CBC with Differential    Collection Time: 07/22/24  5:51 AM   Result Value Ref Range    WBC 8.79 4.50 - 11.50 x10(3)/mcL    RBC 4.20 (L) 4.70 - 6.10 x10(6)/mcL    Hgb 11.0 (L) 14.0 - 18.0 g/dL    Hct 34.8 (L) 42.0 - 52.0 %    MCV 82.9 80.0 - 94.0 fL    MCH 26.2 (L) 27.0 - 31.0 pg    MCHC 31.6 (L) 33.0 - 36.0 g/dL    RDW 16.0 11.5 - 17.0 %    Platelet 235 130 - 400 x10(3)/mcL    MPV 10.1 7.4 - 10.4 fL    Neut % 49.8 %    Lymph % 27.2 %    Mono % 13.9 %    Eos % 7.8 %    Basophil % 1.1 %    Lymph # 2.39 0.6 - 4.6 x10(3)/mcL    Neut # 4.37 2.1 - 9.2 x10(3)/mcL    Mono # 1.22 0.1 - 1.3 x10(3)/mcL    Eos # 0.69 0 - 0.9 x10(3)/mcL    Baso # 0.10 <=0.2 x10(3)/mcL    IG# 0.02 0 - 0.04 x10(3)/mcL    IG% 0.2 %    NRBC% 0.0 %   Basic Metabolic Panel    Collection Time: 07/23/24  3:54 AM   Result Value Ref Range    Sodium 145 136 - 145 mmol/L    Potassium 3.2 (L) 3.5 - 5.1 mmol/L    Chloride 111 (H) 98 - 107 mmol/L    CO2 23 23 - 31 mmol/L    Glucose 78 (L) 82 - 115 mg/dL    Blood Urea Nitrogen 12.7 8.4 - 25.7 mg/dL    Creatinine 0.75 0.73 - 1.18 mg/dL    BUN/Creatinine Ratio 17     Calcium 8.8 8.8 - 10.0 mg/dL    Anion Gap 11.0 mEq/L    eGFR >60 mL/min/1.73/m2   CBC Without Differential    Collection Time: 07/23/24  3:54 AM   Result Value Ref Range    WBC 6.95 4.50 - 11.50 x10(3)/mcL    RBC 4.19 (L) 4.70 - 6.10 x10(6)/mcL    Hgb 11.0 (L) 14.0 - 18.0 g/dL    Hct 35.0 (L) 42.0 - 52.0 %    MCV 83.5 80.0 - 94.0 fL    MCH 26.3 (L) 27.0 - 31.0 pg    MCHC 31.4 (L) 33.0 - 36.0 g/dL    RDW 15.8 11.5 - 17.0 %    Platelet 268 130 - 400 x10(3)/mcL    MPV 10.0 7.4 - 10.4 fL     NRBC% 0.0 %       Imaging:  XR Gastric tube check, non-radiologist performed    Result Date: 7/21/2024  EXAMINATION: XR GASTRIC TUBE CHECK, NON-RADIOLOGIST PERFORMED CLINICAL HISTORY: not tolerating tube feeds via PEG, resistance when attempting to tighten external bumper; TECHNIQUE: One view COMPARISON: January 20, 2024 FINDINGS: Contrast instilled through the gastrostomy tube partially opacifies the stomach.  There is no contrast extravasation.     Gastrostomy tube terminates within the stomach. Electronically signed by: Sami Taylor Date:    07/21/2024 Time:    15:39    X-Ray Chest 1 View    Result Date: 7/21/2024  EXAMINATION: XR CHEST 1 VIEW CLINICAL HISTORY: sob; TECHNIQUE: Single frontal view of the chest was performed. COMPARISON: 07/18/2024 FINDINGS: LINES AND TUBES: Tracheostomy cannula projects over the trachea with tip at the level of the clavicular heads.  Dual lead cardiac pacer device is in place via right subclavian approach with leads overlying the right atrium and right ventricle. MEDIASTINUM AND MARJORIE: Cardiac silhouette is enlarged.  Aortic atherosclerosis. LUNGS: No lobar consolidation. No edema. PLEURA:No pleural effusion. No pneumothorax. OTHER: No acute osseous abnormality.     Enlarged cardiac silhouette without edema Electronically signed by: Laureen Christina Date:    07/21/2024 Time:    12:08    CT Chest Abdomen Pelvis With IV Contrast (XPD) NO Oral Contrast    Result Date: 7/19/2024  EXAMINATION: CT CHEST ABDOMEN PELVIS WITH IV CONTRAST (XPD) CLINICAL HISTORY: absent bowel sounds/Vomiting; TECHNIQUE: Low dose axial images, sagittal and coronal reformations were obtained from the thoracic inlet to the pubic symphysis following the IV contrast administration. Automatic exposure control is utilized to reduce patient radiation exposure. COMPARISON: None FINDINGS: The lungs are adequately aerated.  No pneumothorax is seen.  No pulmonary contusion is seen.  No pleural effusion is seen.  No  infiltrate is seen. The thoracic aorta is normal in caliber.  No dissection or aneurysm is seen.  No retrosternal hematoma is seen. The abdominal aorta appears grossly unremarkable.  No dissection or posttraumatic changes are seen. The heart appears normal. The esophagus is fluid-filled and dilated. There is a gastrostomy tube in the stomach. The liver appears normal.  No liver mass or lesion is seen.  Portal and hepatic veins appear normal. The gallbladder appears normal.  No gallstones are seen.. The spleen appears normal.  No splenic mass is seen..  The pancreas appears grossly unremarkable.  No pancreatic mass or lesion is seen.  No inflammation is seen. No adrenal abnormality is seen.  No adrenal nodule is seen. The kidneys are well perfused.  There is some cysts seen in both kidneys.  No hydronephrosis is seen.  No hydroureter is seen.  No retroperitoneal hematoma is seen. Visualized portions of the bowel shows no acute abnormality.  No colitis is seen.  No diverticulitis is seen.  No colonic mass is seen. No free air is seen.  No free fluid is seen. The urinary bladder is decompressed due to Fernandez catheter but there are some inflammatory changes and wall thickening seen in the urinary bladder concerning for cystitis.. Dysplastic changes seen in the left hip with significant amount heterotrophic bone formation seen.  These findings appear to be chronic.     Dilated fluid-filled esophagus.  Findings are consistent with gastroesophageal reflux Urinary bladder wall thickening and inflammatory changes seen concerning for cystitis Electronically signed by: Erick Grant Date:    07/19/2024 Time:    10:59    CT Head Without Contrast    Result Date: 7/19/2024  Technique: CT of the head was performed without intravenous contrast with direct axial as well as coronal and sagittal reconstruction images. Comparison: Comparison is with study dated 2024-05-15 10:08:14. Clinical history: Mental status change, unknown cause;  vomiting with hx of ventriculomnegaly. FINDINGS: There is no significant change in the large area of encephalomalacia involving the right cerebrum. There is likewise no apparent change in the degree of outward protrusion of the cerebral parenchyma through the wide right sided craniotomy defect. The degree of dilatation of the lateral ventricles remains stable. There are no new hypodensities to suggest an acute infarct. No acute intracranial hemorrhage is seen. CSF spaces: Subarachnoid space: Within normal limits. Brain parenchyma: There is no acute large vessel territory infarct. Calvarium: Post operative wide craniotomy in the right is again noted. Maxillofacial Structures: Paranasal sinuses: The visualized paranasal sinuses appear clear with no significant mucoperiosteal thickening or air fluid levels identified.     Impression: 1. There is no significant change in the large area of encephalomalacia involving the right cerebrum. There is likewise no apparent change in the degree of outward protrusion of the cerebral parenchyma through the wide right sided craniotomy defect. The degree of dilatation of the lateral ventricles remains stable. There are no new hypodensities to suggest an acute infarct. No acute intracranial hemorrhage is seen. 2. Details and findings as noted above. No significant discrepancy with overnight report. Electronically signed by: Sami Taylor Date:    07/19/2024 Time:    07:26    X-Ray Chest AP Portable    Result Date: 7/18/2024  EXAMINATION: XR CHEST AP PORTABLE CLINICAL HISTORY: Gastric contents in respiratory tract, part unspecified causing other injury, initial encounter COMPARISON: 15 May 2024 FINDINGS: Frontal view of the chest was obtained. Tracheostomy remains.  There is right-sided AICD.  Stable cardiomegaly.  Lungs are grossly clear.  No pneumothorax.     No acute findings. Electronically signed by: Tani Calderon Date:    07/18/2024 Time:    16:42         Assessment/Plan:  68 y.o.  male known to Dr. Escalona from inpatient care (primary GI is Dr. 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  EKG on admit showed AFib with RVR.  Given metoprolol and started on IV antibiotics for UTI.  Admitted for further care.  Patient is not tolerating tube feeds today therefore GI was consulted.     Reflux of tube feeds  Tfs currently held- no residuals per nurse and no vomiting x 2 days.  - Ok to resume Tfs today- would start at 10 cc/hr and increase by 10 ml every 4 hours to see how he tolerates to goal.   - keep HOB elevated at least 30 degrees to prevent aspiration  - continue PPI     Thank you for allowing us to participate in this patient's care.  Mally Gautam Np acting as scribe for Dr. Sergio Francois MD

## 2024-07-23 NOTE — PLAN OF CARE
Received call from daughter Carmen regarding possible transfer to [facility]. Referral sent via [facility]. Patient is currently at [facility]. I did make her aware we could attempt placement but once patient is medically stable and ready for discharge patient can return to previous facility and  can continue to work on placement at t new facility. Updates can be provided to her or sister Beth (contact listed on facesheet).

## 2024-07-23 NOTE — PROGRESS NOTES
Inpatient Nutrition Assessment    Admit Date: 7/18/2024   Total duration of encounter: 5 days   Patient Age: 68 y.o.    Nutrition Recommendation/Prescription     Tube feeding as tolerated:  Impact Peptide 1.5 (as tolerated) goal rate 80 ml/hr and Patel BID to provide (calculations based on estimated 20 hr/d run time)   2580 kcal, 100% needs  155 g protein, 100% needs  230 g carbohydrate, 100% needs  1232 ml free water, 50% needs, will require additional fluid source, can be given as 60 ml/hr water flush when IV fluid stopped    Consider increasing Clinimix E 4.25/5 to 125 ml/hr for now, to provide:   1020 kcal, 46% needs  128 g AA, 90% needs  150 g dextrose, 67% needs  3000 ml fluid, 115% needs    Communication of Recommendations: reviewed with nurse    Nutrition Assessment     Malnutrition Assessment/Nutrition-Focused Physical Exam    Malnutrition Context: acute illness or injury (07/19/24 1243)  Malnutrition Level:  (does not meet criteria) (07/19/24 1243)  Energy Intake (Malnutrition):  (unable to obtain) (07/19/24 1243)  Weight Loss (Malnutrition):  (none per malnutrition screen or weight history, unable to verify) (07/19/24 1243)  Subcutaneous Fat (Malnutrition):  (none noted) (07/19/24 1243)           Muscle Mass (Malnutrition):  (none noted) (07/19/24 1243)                                   A minimum of two characteristics is recommended for diagnosis of either severe or non-severe malnutrition.    Chart Review    Reason Seen: physician consult for tube feeding    Malnutrition Screening Tool Results   Have you recently lost weight without trying?: No  Have you been eating poorly because of a decreased appetite?: No   MST Score: 0   Diagnosis:  Sepsis, secondary to bronchitis and possible UTI  Afib, RVR  Acute on chronic respiratory alkalosis  Stable ventriculomegaly/hydrocephalus awaiting high volume LP on 7/24/24  MCA CVA with hemorrhagic conversion status post right frontotemporal decompressive  hemicraniectomy in November of 2023 with residual aphasia and left hemiplegia  Seizure disorder  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  Essential HTN  Hyperlipidemia  CAD  Hx Enterobacter cloacea UTI  Hx Vfib Arrest s/p AICD placement  Aphasia status post tracheostomy on 12/20/2023 and PEG tube placement on 01/02/2024    Relevant Medical History: HTN, Afib, on Xarelto, Hx Vfib arrest s/p AICD in 2018, neuroendocrine carcinoma of the small bowel s/p bowel resection in 2018, MCA CVA s/p right ICA thrombectomy with hemorrhagic conversion right frontotemporal decompressive hemicraniectomy on 12/20/23 with residual aphasia and spastic left hemiplegia, seizure disorder, trache/peg dependent, 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 with plans to undergo high volume LP on 7/24/24 to determine if  shunt is warranted     Scheduled Medications:  ampicillin-sulbactam, 3 g, Q6H  busPIRone, 5 mg, TID  cholestyramine-aspartame, 4 g, BID  diltiaZEM, 60 mg, Q6H  finasteride, 5 mg, Daily  lactated ringers, 500 mL, Once  Lactobacillus rhamnosus GG, 1 packet, Daily  levETIRAcetam (Keppra) IV (PEDS and ADULTS), 1,500 mg, Q12H  losartan, 100 mg, Daily  metoprolol tartrate, 100 mg, BID  miconazole NITRATE 2 %, , BID  neomycin-bacitracin-polymyxin, , BID  pantoprazole, 40 mg, Q12H  QUEtiapine, 25 mg, BID  rivaroxaban, 20 mg, with dinner  scopolamine, 1 patch, Q3 Days  sulfamethoxazole-trimethoprim 800-160mg, 1 tablet, BID  zinc oxide-cod liver oil, , BID    Continuous Infusions:  amino acid 4.25% - dextrose 5% solution, Last Rate: 75 mL/hr at 07/23/24 0554    PRN Medications:   acetaminophen, 1,000 mg, Q6H PRN  albuterol-ipratropium, 3 mL, Q6H PRN  cloNIDine, 0.1 mg, Q8H PRN  diltiaZEM, 10 mg, Q1H PRN  metoprolol, 5 mg, Q6H PRN  ondansetron, 4 mg, Q6H PRN  sodium chloride 3%, 4 mL, Q6H PRN    Calorie Containing IV Medications: no significant kcals from medications at this time    Recent Labs   Lab  07/18/24  1458 07/19/24  0315 07/20/24  0355 07/21/24  0845 07/22/24  0551 07/23/24  0354    140 142 142 145 145   K 3.7 3.8 3.2* 4.5 3.6 3.2*   CALCIUM 9.4 8.6* 9.1 7.9* 8.7* 8.8   PHOS  --  3.6  --   --   --   --    MG  --  2.00 2.20  --   --   --    CO2 30 22* 19* 20* 21* 23   BUN 16.7 13.6 9.9 20.3 16.4 12.7   CREATININE 0.80 0.74 0.72* 0.70* 0.71* 0.75   EGFRNORACEVR >60 >60 >60 >60 >60 >60   GLUCOSE 94 97 113 102 90 78*   BILITOT 0.4 0.5 0.7 0.7 0.7  --    ALKPHOS 170* 154* 174* 143 126  --    ALT 26 23 23 18 15  --    AST 23 28 23 20 15  --    ALBUMIN 3.5 3.2* 3.6 3.1* 2.9*  --    WBC 7.91 9.83 9.65 9.44 8.79 6.95   HGB 12.3* 11.1* 12.4* 12.0* 11.0* 11.0*   HCT 39.0* 34.9* 38.0* 38.6* 34.8* 35.0*     Nutrition Orders:  amino acid 4.25% - dextrose 5% solution  Tube Feedings/Formulas 80; 1,600; Impact Peptide 1.5; Gastrostomy (substitute with Pivot 1.5 as needed; start at 20 ml/hr and advance by 20 ml/hr every 4 hours as tolerated); 30; Every 4 hours,Tube Feedings/Formulas Other (see comments); Gastrostomy; Patel - Orange; 2 times daily    Appetite/Oral Intake: NPO/not applicable  Factors Affecting Nutritional Intake: difficulty/impaired swallowing, NPO, and tracheostomy  Social Needs Impacting Access to Food: none identified  Food/Synagogue/Cultural Preferences: unable to obtain  Food Allergies: no known food allergies  Last Bowel Movement: 07/20/24  Wound(s):     Wound 07/19/24 1000 Other (comment) Scrotum-Tissue loss description: Not applicable       Wound 07/19/24 1000 Abrasion(s) Left anterior Knee-Tissue loss description: Partial thickness       Wound 07/19/24 1000 Pressure Injury Left lateral Malleolus-Tissue loss description: Partial thickness       Altered Skin Integrity 02/26/24 1100 lower Buttocks Moisture associated dermatitis-Tissue loss description: Not applicable    Comments    7/19/24 Patient unable to provide information, discussed with physician, ok to start tube feeding, will provide  "orders. He is on Vital 1.5 at nursing home per notes, will order Impact Peptide (substitute with Pivot 1.5 as needed due to formula shortage); also add Patel for wound healing.     7/23/24 Patient developed vomiting with tube feeding, held without any vomiting for 2 days, plans to resume today at 10 ml/hr per GI and advance by 10 ml every 4 hours. He is on Clinimix 4.25/5 at 75 ml/hr, if inability to tolerate tube feeding persists, may need to consider central venous access for TPN.    Anthropometrics    Height: 5' 10" (177.8 cm), Height Method: Stated  Last Weight: 117.9 kg (260 lb) (07/18/24 1408), Weight Method: Stated  BMI (Calculated): 37.3  BMI Classification: obese grade II (BMI 35-39.9)        Ideal Body Weight (IBW), Male: 166 lb     % Ideal Body Weight, Male (lb): 156.63 %                          Usual Weight Provided By: unable to obtain usual weight    Wt Readings from Last 5 Encounters:   07/18/24 117.9 kg (260 lb)   06/25/24 90.7 kg (200 lb)   05/15/24 90.7 kg (200 lb)   02/25/24 95.6 kg (210 lb 12.2 oz)   01/20/24 117.9 kg (260 lb)     Weight Change(s) Since Admission:   (7/19) admission weight (117.9 kg) documented as stated, unable to confirm on bed scale during rounds (bed would not weigh in current position and nurse reports respiratory decline when lays flat), documented weight appears like it may be higher than patient actually weighs but unable to confirm  Wt Readings from Last 1 Encounters:   07/18/24 1408 117.9 kg (260 lb)   Admit Weight: 117.9 kg (260 lb) (07/18/24 1408), Weight Method: Stated    Estimated Needs    Weight Used For Calorie Calculations: 108.9 kg (240 lb)  Energy Calorie Requirements (kcal): 7295-6690, 1.2-1.4 stress factor  Energy Need Method: [place]-St Jeor  Weight Used For Protein Calculations: 108.9 kg (240 lb)  Protein Requirements: 142-164 g, 1.3-1.5 g/kg  Fluid Requirements (mL): 4908-8330, 1 ml/kcal or per physician  CHO Requirement: 224-393 g, 40-60% of kcal "     Enteral Nutrition Patient not receiving enteral nutrition at this time.    Parenteral Nutrition     Standard Formula: Clinimix E 4.25/5  Custom Formula: not applicable  Additives: none  Rate/Volume: 75 ml/hr  Lipids: none  Total Nutrition Provided by Parenteral Nutrition:  Calories Provided  612 kcal/d, 27% needs   Protein Provided  77 g/d, 54% needs   Dextrose Provided  90 g/d, GIR 0.6 mg CHO/kg/min   Fluid Provided  1800 ml/d, 80% needs     Evaluation of Received Nutrient Intake    Calories: not meeting estimated needs  Protein: not meeting estimated needs    Patient Education Not applicable.    Nutrition Diagnosis     PES: Inadequate energy intake related to inability to consume sufficient nutrients as evidenced by less than 80% needs met. (active)    Nutrition Interventions     Intervention(s): modified composition of enteral nutrition, modified rate of enteral nutrition, and collaboration with other providers  Goal: Meet greater than 80% of nutritional needs by follow-up. (goal not met)  Goal: Tolerate enteral feeding at goal rate by follow-up. (goal not met)    Nutrition Goals & Monitoring     Dietitian will monitor: energy intake, enteral nutrition intake, parenteral nutrition intake, weight, weight change, beliefs/attitudes, glucose/endocrine profile, and gastrointestinal profile  Discharge planning: tube feeding (as recommended/ordered)  Nutrition Risk/Follow-Up: high (follow-up in 1-4 days)   Please consult if re-assessment needed sooner.

## 2024-07-24 PROCEDURE — 63600175 PHARM REV CODE 636 W HCPCS: Performed by: NURSE PRACTITIONER

## 2024-07-24 PROCEDURE — 27200966 HC CLOSED SUCTION SYSTEM

## 2024-07-24 PROCEDURE — 99900031 HC PATIENT EDUCATION (STAT)

## 2024-07-24 PROCEDURE — 63600175 PHARM REV CODE 636 W HCPCS: Performed by: INTERNAL MEDICINE

## 2024-07-24 PROCEDURE — 27000207 HC ISOLATION

## 2024-07-24 PROCEDURE — 25000003 PHARM REV CODE 250: Performed by: INTERNAL MEDICINE

## 2024-07-24 PROCEDURE — 94760 N-INVAS EAR/PLS OXIMETRY 1: CPT

## 2024-07-24 PROCEDURE — 25000003 PHARM REV CODE 250: Performed by: NURSE PRACTITIONER

## 2024-07-24 PROCEDURE — 27000221 HC OXYGEN, UP TO 24 HOURS

## 2024-07-24 PROCEDURE — 25000003 PHARM REV CODE 250: Performed by: STUDENT IN AN ORGANIZED HEALTH CARE EDUCATION/TRAINING PROGRAM

## 2024-07-24 PROCEDURE — 94761 N-INVAS EAR/PLS OXIMETRY MLT: CPT

## 2024-07-24 PROCEDURE — 21400001 HC TELEMETRY ROOM

## 2024-07-24 PROCEDURE — 99900035 HC TECH TIME PER 15 MIN (STAT)

## 2024-07-24 PROCEDURE — 63600175 PHARM REV CODE 636 W HCPCS: Performed by: STUDENT IN AN ORGANIZED HEALTH CARE EDUCATION/TRAINING PROGRAM

## 2024-07-24 PROCEDURE — 99900026 HC AIRWAY MAINTENANCE (STAT)

## 2024-07-24 RX ADMIN — BUSPIRONE HYDROCHLORIDE 5 MG: 5 TABLET ORAL at 04:07

## 2024-07-24 RX ADMIN — QUETIAPINE FUMARATE 25 MG: 25 TABLET ORAL at 09:07

## 2024-07-24 RX ADMIN — AMPICILLIN SODIUM AND SULBACTAM SODIUM 3 G: 2; 1 INJECTION, POWDER, FOR SOLUTION INTRAMUSCULAR; INTRAVENOUS at 11:07

## 2024-07-24 RX ADMIN — DILTIAZEM HYDROCHLORIDE 60 MG: 60 TABLET, FILM COATED ORAL at 05:07

## 2024-07-24 RX ADMIN — BUSPIRONE HYDROCHLORIDE 5 MG: 5 TABLET ORAL at 09:07

## 2024-07-24 RX ADMIN — BUSPIRONE HYDROCHLORIDE 5 MG: 5 TABLET ORAL at 10:07

## 2024-07-24 RX ADMIN — FINASTERIDE 5 MG: 5 TABLET, FILM COATED ORAL at 09:07

## 2024-07-24 RX ADMIN — LEVETIRACETAM INJECTION 1500 MG: 15 INJECTION INTRAVENOUS at 12:07

## 2024-07-24 RX ADMIN — CHOLESTYRAMINE 4 G: 4 POWDER, FOR SUSPENSION ORAL at 10:07

## 2024-07-24 RX ADMIN — DILTIAZEM HYDROCHLORIDE 60 MG: 60 TABLET, FILM COATED ORAL at 06:07

## 2024-07-24 RX ADMIN — AMPICILLIN SODIUM AND SULBACTAM SODIUM 3 G: 2; 1 INJECTION, POWDER, FOR SOLUTION INTRAMUSCULAR; INTRAVENOUS at 04:07

## 2024-07-24 RX ADMIN — AMPICILLIN SODIUM AND SULBACTAM SODIUM 3 G: 2; 1 INJECTION, POWDER, FOR SOLUTION INTRAMUSCULAR; INTRAVENOUS at 06:07

## 2024-07-24 RX ADMIN — DILTIAZEM HYDROCHLORIDE 60 MG: 60 TABLET, FILM COATED ORAL at 01:07

## 2024-07-24 RX ADMIN — MICONAZOLE NITRATE 2 % TOPICAL POWDER: at 10:07

## 2024-07-24 RX ADMIN — MICONAZOLE NITRATE 2 % TOPICAL POWDER: at 09:07

## 2024-07-24 RX ADMIN — BACITRACIN ZINC, NEOMYCIN, POLYMYXIN B: 400; 3.5; 5 OINTMENT TOPICAL at 10:07

## 2024-07-24 RX ADMIN — Medication: at 09:07

## 2024-07-24 RX ADMIN — DILTIAZEM HYDROCHLORIDE 60 MG: 60 TABLET, FILM COATED ORAL at 11:07

## 2024-07-24 RX ADMIN — BACITRACIN ZINC, NEOMYCIN, POLYMYXIN B: 400; 3.5; 5 OINTMENT TOPICAL at 09:07

## 2024-07-24 RX ADMIN — LEVETIRACETAM INJECTION 1500 MG: 15 INJECTION INTRAVENOUS at 10:07

## 2024-07-24 RX ADMIN — PANTOPRAZOLE SODIUM 40 MG: 40 INJECTION, POWDER, LYOPHILIZED, FOR SOLUTION INTRAVENOUS at 10:07

## 2024-07-24 RX ADMIN — METOPROLOL TARTRATE 100 MG: 50 TABLET, FILM COATED ORAL at 10:07

## 2024-07-24 RX ADMIN — PANTOPRAZOLE SODIUM 40 MG: 40 INJECTION, POWDER, LYOPHILIZED, FOR SOLUTION INTRAVENOUS at 09:07

## 2024-07-24 RX ADMIN — LEUCINE, PHENYLALANINE, LYSINE, METHIONINE, ISOLEUCINE, VALINE, HISTIDINE, THREONINE, TRYPTOPHAN, ALANINE, GLYCINE, ARGININE, PROLINE, SERINE, TYROSINE, SODIUM ACETATE, DIBASIC POTASSIUM PHOSPHATE, MAGNESIUM CHLORIDE, SODIUM CHLORIDE, CALCIUM CHLORIDE, DEXTROSE
311; 238; 247; 170; 255; 247; 204; 179; 77; 880; 438; 489; 289; 213; 17; 297; 261; 51; 77; 33; 5 INJECTION INTRAVENOUS at 09:07

## 2024-07-24 RX ADMIN — LOSARTAN POTASSIUM 100 MG: 50 TABLET, FILM COATED ORAL at 09:07

## 2024-07-24 RX ADMIN — Medication: at 10:07

## 2024-07-24 RX ADMIN — Medication 1 EACH: at 09:07

## 2024-07-24 RX ADMIN — QUETIAPINE FUMARATE 25 MG: 25 TABLET ORAL at 10:07

## 2024-07-24 RX ADMIN — RIVAROXABAN 20 MG: 10 TABLET, FILM COATED ORAL at 04:07

## 2024-07-24 RX ADMIN — SULFAMETHOXAZOLE AND TRIMETHOPRIM 1 TABLET: 800; 160 TABLET ORAL at 09:07

## 2024-07-24 RX ADMIN — LEVETIRACETAM INJECTION 1500 MG: 15 INJECTION INTRAVENOUS at 08:07

## 2024-07-24 RX ADMIN — SCOPOLAMINE 1 PATCH: 1 PATCH TRANSDERMAL at 11:07

## 2024-07-24 RX ADMIN — CHOLESTYRAMINE 4 G: 4 POWDER, FOR SUSPENSION ORAL at 09:07

## 2024-07-24 RX ADMIN — METOPROLOL TARTRATE 100 MG: 50 TABLET, FILM COATED ORAL at 09:07

## 2024-07-24 RX ADMIN — SULFAMETHOXAZOLE AND TRIMETHOPRIM 1 TABLET: 800; 160 TABLET ORAL at 10:07

## 2024-07-24 NOTE — CONSULTS
[facility] - 4th Floor Medical Telemetry  Neurosurgery  Consult Note    Inpatient consult to Neurosurgery  Consult performed by: Ana Crowder NP  Consult ordered by: Joya Alcocer DO        Subjective:     Chief Complaint/Reason for Admission: Hematemesis, sepsis secondary to UTI and pneumonia     History of Present Illness:   Patient is 68 year old, male with a past medical history of PAF (on Xarelto) HTN, HLD, CAD, V-fib arrest s/p AICD in 2018, neuroendocrine carcinoma of small bowel s/p resection in 2018, seizure disorder, MCA CVA with hemorrhagic conversion s/p decompressive hemicraniotomy in November 2023 with residual aphasia, hemiplegia status post tracheostomy, and PEG placement, and hydrocephalus with ventriculomegaly. He currently resides at a nursing home. Patient was scheduled on 7/24/24 to have a high-volume lumbar puncture with Dr. Thomas to see if the patient needs a  shunt placement. However, on 7/18/24, patient came to the ED for questionable hematemesis and was in a-fib RVR. He was admitted to the hospital medicine services for sepsis secondary to UTI and pneumonia. Urine culture and respiratory cultures were obtained. Respiratory culture showed Acinetobacter Baumannii and urine culture showed klebsiella pneumoniae and enterococcus faecalis. Patient is currently on Unasyn and Bactrim.       On physical examination, patient is lying in bed. He spontaneously open his eyes. He is nonverbal with a trach. He spontaneously move his right arm and squeeze hand but not to command. He has left sided hemiplegia. He has a trach that is hooked to a T-piece for oxygenation. He currently has tubefeed trickling into his PEG. Per RN, he has been tolerating tubefeed since yesterday and had no emesis.         PTA Medications   Medication Sig    ascorbic Acid (VITAMIN C) 500 mg CpSR 500 mg 2 (two) times daily. Per PEG tube    busPIRone (BUSPAR) 5 MG Tab 5 mg by Per G Tube route 3 (three) times daily.     cholestyramine (QUESTRAN) 4 gram packet 4 g once daily. Via PEG tube    cholestyramine-aspartame (QUESTRAN LIGHT) 4 gram PwPk 1 packet (4 g total) by Per G Tube route 2 (two) times daily.    cloNIDine (CATAPRES) 0.1 MG tablet 0.1 mg by Per G Tube route every 8 (eight) hours as needed (Give for systolic >180).    diltiaZEM (CARDIZEM) 60 MG tablet 60 mg by Per G Tube route every 6 (six) hours.    famotidine (PEPCID) 20 MG tablet 1 tablet (20 mg total) by Per G Tube route 2 (two) times daily. (Patient taking differently: 20 mg by Per G Tube route once daily.)    finasteride (PROSCAR) 5 mg tablet Take 1 tablet (5 mg total) by mouth once daily.    hydrALAZINE (APRESOLINE) 50 MG tablet 75 mg by Per G Tube route every 8 (eight) hours as needed (for elevated BP).    L. acidophilus/L.bulgaricus (FLORANEX ORAL) 1 packet by PEG Tube route Daily.    levetiracetam 500 mg/5 mL (5 mL) Soln 1,500 mg by Per G Tube route 2 (two) times daily.    LIPITOR 10 mg tablet 10 mg by Per G Tube route once daily.    losartan (COZAAR) 100 MG tablet 100 mg by Per G Tube route once daily.    metoprolol tartrate (LOPRESSOR) 100 MG tablet 100 mg by Per G Tube route 2 (two) times daily.    multivitamin (THERAGRAN) per tablet 1 tablet by Per G Tube route once daily.    potassium chloride SA (KLOR-CON M15) 15 MEQ tablet 2 tablets once daily. Via PEG Tube    protein supplement (PROMOD PROTEIN ORAL) 30 mLs by Per G Tube route once daily.    QUEtiapine (SEROQUEL) 25 MG Tab 25 mg by Per G Tube route 2 (two) times daily.    scopolamine (TRANSDERM-SCOP) 1.3-1.5 mg (1 mg over 3 days) Place 1 patch onto the skin Every 3 (three) days.    tamsulosin (FLOMAX) 0.4 mg Cap Take 1 capsule (0.4 mg total) by mouth every evening.    venlafaxine 75 mg TR24 1 tablet by Per G Tube route 2 (two) times a day.    XARELTO 20 mg Tab 1 tablet by Per G Tube route nightly.    furosemide (LASIX) 80 MG tablet 80 mg by Per G Tube route as needed (for Edema).       Review of  patient's allergies indicates:  No Known Allergies    Past Medical History:   Diagnosis Date    Arthritis     Atrial fibrillation     BPH (benign prostatic hyperplasia)     Cardiac arrest     Coronary artery disease     Cyst, kidney, acquired     Diverticulosis     Hyperlipidemia     Hypertension     MI (myocardial infarction)     Obesity     Steatosis of liver     Stroke      Past Surgical History:   Procedure Laterality Date    A-V CARDIAC PACEMAKER INSERTION Right     CARDIAC CATHETERIZATION      COLONOSCOPY W/ BIOPSIES      CRANIECTOMY Right 12/20/2023    Procedure: CRANIECTOMY;  Surgeon: Artem Can MD;  Location: [facility] OR;  Service: Neurosurgery;  Laterality: Right;    ESOPHAGOGASTRODUODENOSCOPY W/ PEG N/A 1/2/2024    Procedure: PEG;  Surgeon: Tani Day MD;  Location: [facility] ENDOSCOPY;  Service: Gastroenterology;  Laterality: N/A;    excision of colon      TRACHEOSTOMY N/A 12/29/2023    Procedure: CREATION, TRACHEOSTOMY;  Surgeon: Patricia Winslow MD;  Location: [facility] OR;  Service: ENT;  Laterality: N/A;  REQ 1130 //  NEEDS 2 SCRUBS     Family History       Problem Relation (Age of Onset)    Hypertension Mother, Father, Sister          Tobacco Use    Smoking status: Never    Smokeless tobacco: Never   Substance and Sexual Activity    Alcohol use: Not Currently    Drug use: Not Currently    Sexual activity: Not Currently     Partners: Female     Review of Systems   Reason unable to perform ROS: Patient is nonverbal and does not follow command.     Objective:     Weight: 117.9 kg (260 lb)  Body mass index is 37.31 kg/m².  Vital Signs (Most Recent):  Temp: 97 °F (36.1 °C) (07/24/24 1116)  Pulse: 85 (07/24/24 1116)  Resp: 20 (07/24/24 0955)  BP: 134/87 (07/24/24 1116)  SpO2: 99 % (07/24/24 1116) Vital Signs (24h Range):  Temp:  [97 °F (36.1 °C)-98.9 °F (37.2 °C)] 97 °F (36.1 °C)  Pulse:  [] 85  Resp:  [16-20] 20  SpO2:  [95 %-99 %] 99 %  BP: ()/(65-89) 134/87     Date 07/24/24 0700 -  "07/25/24 0659   Shift 6542-5182 4333-9824 9957-7915 24 Hour Total   INTAKE   NG/GT 40   40   Shift Total(mL/kg) 40(0.3)   40(0.3)   OUTPUT   Shift Total(mL/kg)       Weight (kg) 117.9 117.9 117.9 117.9              Oxygen Concentration (%):  [24] 24             Gastrostomy/Enterostomy 01/02/24 1230 LUQ (Active)   Securement secured to abdomen 07/24/24 0800   Interventions Prior to Feeding patency checked 07/24/24 0800   Feeding Type continuous;by pump 07/24/24 0800   Clamp Status/Tolerance no abdominal discomfort 07/24/24 0800   Feeding Action feeding continued 07/24/24 0800   Dressing no dressing 07/24/24 0800   Insertion Site hypergranulation;no redness 07/24/24 0800   Flush/Irrigation water;flushed w/ 07/24/24 0800   Current Rate (mL/hr) 60 mL/hr 07/24/24 0800   Goal Rate (mL/hr) 80 mL/hr 07/24/24 0800   Water Bolus (mL) 30 mL 07/24/24 0800   Formula Name impact peptide 07/24/24 0800   Tube Feeding Intake (mL) 10 07/24/24 0800   Residual Amount (ml) 0 ml 07/23/24 1830            Urethral Catheter 07/18/24 2119 Double-lumen 16 Fr. (Active)   Site Assessment Clean;Intact 07/24/24 0800   Collection Container Standard drainage bag 07/24/24 0800   Securement Method secured to top of thigh w/ adhesive device 07/24/24 0800   Catheter Care Performed yes 07/24/24 0800   Reason for Continuing Urinary Catheterization Urinary retention 07/24/24 0800   CAUTI Prevention Bundle Securement Device in place with 1" slack 07/24/24 0800       Physical Exam:  Nursing note and vitals reviewed.    Constitutional: He appears well-developed. No distress.     Eyes: Pupils are equal, round, and reactive to light. Conjunctivae are normal.   Pupils: 3mm, brisk, equal, and reactive     Cardiovascular: Normal pulses.     Abdominal: Soft. Bowel sounds are normal.     Psych/Behavior: He is alert.   Unable to assess orientation       Musculoskeletal:      Comments: Left sided hemiplegia  Spontaneously move RUE and RLE     Neurological: " "  Alert  Unable to assess orientation  Does not follow commands  Nonverbal, has a trach       Significant Labs:  Recent Labs   Lab 07/23/24  0354      K 3.2*   *   CO2 23   BUN 12.7   CREATININE 0.75   CALCIUM 8.8     Recent Labs   Lab 07/23/24  0354   WBC 6.95   HGB 11.0*   HCT 35.0*        No results for input(s): "LABPT", "INR", "APTT" in the last 48 hours.  Microbiology Results (last 7 days)       Procedure Component Value Units Date/Time    Blood Culture #1 **CANNOT BE ORDERED STAT** [[id number]]  (Normal) Collected: 07/18/24 1812    Order Status: Completed Specimen: Blood Updated: 07/23/24 2100     Blood Culture No Growth at 5 days    Blood Culture #2 **CANNOT BE ORDERED STAT** [[id number]]  (Normal) Collected: 07/18/24 1812    Order Status: Completed Specimen: Blood Updated: 07/23/24 2100     Blood Culture No Growth at 5 days    Respiratory Culture [[id number]]  (Abnormal)  (Susceptibility) Collected: 07/19/24 0258    Order Status: Completed Specimen: Sputum, Expectorated Updated: 07/22/24 1149     Respiratory Culture Many ACINETOBACTER BAUMANNII     Comment: CRAB (Carbapenem-resistant Acinetobacter baumannii)         Many Proteus mirabilis     Comment: with normal respiratory niyah        GRAM STAIN Quality 3+      Moderate Gram negative diplococci      Many Gram Negative Rods      Moderate Gram Positive Rods      Few Gram positive cocci    Urine culture [[id number]]  (Abnormal)  (Susceptibility) Collected: 07/18/24 1648    Order Status: Completed Specimen: Urine Updated: 07/22/24 0710     Urine Culture >/= 100,000 colonies/ml Klebsiella pneumoniae ssp pneumoniae      >/= 100,000 colonies/ml Enterococcus faecalis     Comment: Ampicillin results may be used to predict susceptibility to amoxicillin-clavulanate, ampicillin-sulbactam, and piperacillin-tazobactam.               Significant Diagnostics:  CT head wo contrast on 7/23/24 at 1711 showed:    FINDINGS:  Perhaps slightly increased " herniation of the brain through the craniectomy defect.  No new parenchymal attenuation abnormality.  No acute hemorrhage seen.  Little if any midline shift.  Similar ventricular enlargement.  There are vascular calcifications.     Impression:     CT for surgical planning.  Perhaps slightly increased herniation of the brain through the craniectomy defect.      Assessment/Plan:    Plan:  - Floor status  - Continue abx: Unasyn and Bactrim for UTI and PNA  - HOB 30 degrees or greater  - PT, OT  - SCDs and Xarelto for DVT prophylaxis       Further recommendations to follow per Dr. Thomas.      Active Diagnoses:    Diagnosis Date Noted POA    PRINCIPAL PROBLEM:  Aspiration of gastric contents [T17.918A] 07/19/2024 Yes    Acute cystitis without hematuria [N30.00] 07/21/2024 Unknown    Severe sepsis [A41.9, R65.20] 07/19/2024 Yes    Seizure [R56.9] 01/17/2024 Yes    Hypertension [I10] 10/05/2022 Yes      Problems Resolved During this Admission:       Thank you for your consult.     BLAIR Babb-BC  Neurosurgery  [facility] - 4th Floor Medical Telemetry

## 2024-07-24 NOTE — PROGRESS NOTES
[facility] Hospital Medicine Progress Note        Chief Complaint: Inpatient Follow-up    HPI:     68-year-old  male with significant history of PAF on Xarelto, VFib arrest status post AICD in 2018, MCA CVA with hemorrhagic conversion requiring decompressive hemicraniotomy in November of 2023 with residual aphasia, hemiplegia status post tracheostomy, peg tube placement.  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  Patient is a nursing home resident.  High-volume LP scheduled as outpatient on 07/24/2024 by Dr. Thomas to see if  shunt is warranted.  Patient was brought to the ED on 07/18 from the nursing home for questionable dark emesis.  Noted to be in AFib RVR.  CT head with chronic findings.  Lab significant for leukocytosis, bacteriuria.  Mentation at baseline.  Admitted to hospital medicine services for sepsis secondary to UTI versus tracheobronchitis, urine and respiratory cultures collected.  Concern for new onset seizures for which Keppra initiated, seizure precautions.  Urine cultures negative, but respiratory cultures with Gram-negative rods, ceftriaxone switched to cefepime.  Secretions clearing up on 3% neb.     Cefepime was discontinued as seen on Acinetobacter baumannii sensitivities was multi-drug resistant.  Was started on Unasyn and Bactrim    Interval Hx:     Patient seen and examined by bedside.  I baseline.  Tube feeds has been restarted and are slowly trickling.  Not at goal just yet however patient is tolerating without any further emesis.  Secretions are also seemed to be improving.    Objective/physical exam:    General: In no acute distress, afebrile  Chest: Clear to auscultation bilaterally  Heart: S1, S2, no appreciable murmur  Abdomen: Soft, nontender, BS +  MSK: Warm, no lower extremity edema, no clubbing or cyanosis  Neurologic:  Awake,  alert    VITAL SIGNS: 24 HRS MIN & MAX LAST   Temp  Min: 98 °F (36.7 °C)  Max: 98.9 °F (37.2 °C) 98 °F (36.7 °C)   BP  Min: 98/65  Max: 149/87 136/89   Pulse  Min: 75  Max: 105  105   Resp  Min: 16  Max: 20 20   SpO2  Min: 95 %  Max: 99 % 99 %       Recent Labs   Lab 07/23/24 0354   WBC 6.95   RBC 4.19*   HGB 11.0*   HCT 35.0*   MCV 83.5   MCH 26.3*   MCHC 31.4*   RDW 15.8      MPV 10.0         Recent Labs   Lab 07/18/24 2115 07/19/24 0315 07/20/24 0355 07/21/24  0845 07/22/24  0551 07/23/24 0354   NA  --    < > 142   < > 145 145   K  --    < > 3.2*   < > 3.6 3.2*   CL  --    < > 110*   < > 112* 111*   CO2  --    < > 19*   < > 21* 23   BUN  --    < > 9.9   < > 16.4 12.7   CREATININE  --    < > 0.72*   < > 0.71* 0.75   CALCIUM  --    < > 9.1   < > 8.7* 8.8   PH 7.680*  --   --   --   --   --    MG  --    < > 2.20  --   --   --    ALBUMIN  --    < > 3.6   < > 2.9*  --    ALKPHOS  --    < > 174*   < > 126  --    ALT  --    < > 23   < > 15  --    AST  --    < > 23   < > 15  --    BILITOT  --    < > 0.7   < > 0.7  --     < > = values in this interval not displayed.          Microbiology Results (last 7 days)       Procedure Component Value Units Date/Time    Blood Culture #1 **CANNOT BE ORDERED STAT** [[id number]]  (Normal) Collected: 07/18/24 1812    Order Status: Completed Specimen: Blood Updated: 07/23/24 2100     Blood Culture No Growth at 5 days    Blood Culture #2 **CANNOT BE ORDERED STAT** [[id number]]  (Normal) Collected: 07/18/24 1812    Order Status: Completed Specimen: Blood Updated: 07/23/24 2100     Blood Culture No Growth at 5 days    Respiratory Culture [[id number]]  (Abnormal)  (Susceptibility) Collected: 07/19/24 0258    Order Status: Completed Specimen: Sputum, Expectorated Updated: 07/22/24 1149     Respiratory Culture Many ACINETOBACTER BAUMANNII     Comment: CRAB (Carbapenem-resistant Acinetobacter baumannii)         Many Proteus mirabilis     Comment: with normal respiratory niyah         GRAM STAIN Quality 3+      Moderate Gram negative diplococci      Many Gram Negative Rods      Moderate Gram Positive Rods      Few Gram positive cocci    Urine culture [[id number]]  (Abnormal)  (Susceptibility) Collected: 07/18/24 1648    Order Status: Completed Specimen: Urine Updated: 07/22/24 0710     Urine Culture >/= 100,000 colonies/ml Klebsiella pneumoniae ssp pneumoniae      >/= 100,000 colonies/ml Enterococcus faecalis     Comment: Ampicillin results may be used to predict susceptibility to amoxicillin-clavulanate, ampicillin-sulbactam, and piperacillin-tazobactam.                Scheduled Med:   ampicillin-sulbactam  3 g Intravenous Q6H    busPIRone  5 mg Per G Tube TID    cholestyramine-aspartame  4 g Per G Tube BID    diltiaZEM  60 mg Per G Tube Q6H    finasteride  5 mg Per G Tube Daily    lactated ringers  500 mL Intravenous Once    Lactobacillus rhamnosus GG  1 packet Per G Tube Daily    levETIRAcetam (Keppra) IV (PEDS and ADULTS)  1,500 mg Intravenous Q12H    losartan  100 mg Per G Tube Daily    metoprolol tartrate  100 mg Per G Tube BID    miconazole NITRATE 2 %   Topical (Top) BID    neomycin-bacitracin-polymyxin   Topical (Top) BID    pantoprazole  40 mg Intravenous Q12H    QUEtiapine  25 mg Per G Tube BID    rivaroxaban  20 mg Per G Tube with dinner    scopolamine  1 patch Transdermal Q3 Days    sulfamethoxazole-trimethoprim 800-160mg  1 tablet Per G Tube BID    zinc oxide-cod liver oil   Topical (Top) BID          Assessment/Plan:    Acute tracheobronchitis secondary to Proteus/Acinetobacter  Acute hypoxemic respiratory failure secondary to above  Sepsis secondary to above  Bacteriuria-likely chronic colonization  New onset seizure disorder  PAF with intermittent RVR  PEG/trach dependent  Peg tube malfunction/tube feeding intolerance  History of VFib arrest status post AICD in 2018  History of MCA CVA with hemorrhagic conversion requiring right frontotemporal decompressive hemicraniotomy,  aphasic/left hemiplegic   Ventriculomegaly secondary to suspected hydrocephalus  History of neuroendocrine cancer of the small bowel status post resection in 2018   HTN   HLD   History of CAD  Prophylaxis      Respiratory cultures-Acinetobacter, Proteus ; Acinetobacter baumannii shown to be multi-drug resistant   Continue with Unasyn and Bactrim, day 3 patient appears to be clinically improving  Afebrile, hemodynamically stable with no leukocytosis  Continue 3% nebs as needed to clear up secretions  Supplemental oxygen via trach to maintain saturations more than 90% , on 2 L  Urine cultures  positive for Klebsiella pneumoniae and Enterococcus faecalis,  Bactrim and Unasyn should cover  Keep p.o. Cardizem, p.o. metoprolol tartrate   On p.r.n. IV Cardizem and p.r.n. IV metoprolol   Patient is scheduled to undergo large volume LP on 07/24 with Dr. Thomas ,   Neurosurgery was consulted and plan to see today  Seizures now controlled on Keppra   EEG done findings consistent with prior craniotomy.  No active seizure  In case of recurrence will consult Neurology  Few episodes of diarrhea which improved with Questran, questionable dark emesis prior to transfer from nursing home, no more here and hemoglobin is stable, hold off on GI evaluation  I resumed Xarelto 7/20, tolerating without overt bleeding  Resuming tube feeds slowly today.  Patient seems to be tolerating so far.  Continue other home meds-Seroquel, losartan, finasteride, BuSpar   DVT prophylaxis-on Xarelto    Critical care note:  Critical care diagnosis:  multi-drug resistant respiratory infection  Critical care interventions: Hands-on evaluation, review of labs/radiographs/records and discussion with patient and family if present  Critical care time spent: 35 minutes       Joya Alcocer DO  Department of Hospital Medicine  [facility]  07/24/2024

## 2024-07-24 NOTE — PLAN OF CARE
Clinical update sent to [facility] via [employer].      [facility] unable to accept patient.

## 2024-07-24 NOTE — NURSING
Nurses Note -- 4 Eyes      7/24/2024   2:03 PM      Skin assessed during: Q Shift Change      [] No Altered Skin Integrity Present    []Prevention Measures Documented      [x] Yes- Altered Skin Integrity Present or Discovered   [] LDA Added if Not in Epic (Describe Wound)   [] New Altered Skin Integrity was Present on Admit and Documented in LDA   [] Wound Image Taken    Wound Care Consulted? Yes    Attending Nurse:  Jose Johnson RN/Staff Member:  Katlin Gonzalez LPN

## 2024-07-25 LAB
ANION GAP SERPL CALC-SCNC: 4 MEQ/L
BASOPHILS # BLD AUTO: 0.05 X10(3)/MCL
BASOPHILS NFR BLD AUTO: 0.9 %
BUN SERPL-MCNC: 16.3 MG/DL (ref 8.4–25.7)
CALCIUM SERPL-MCNC: 8.4 MG/DL (ref 8.8–10)
CHLORIDE SERPL-SCNC: 113 MMOL/L (ref 98–107)
CO2 SERPL-SCNC: 25 MMOL/L (ref 23–31)
CREAT SERPL-MCNC: 0.65 MG/DL (ref 0.73–1.18)
CREAT/UREA NIT SERPL: 25
EOSINOPHIL # BLD AUTO: 0.85 X10(3)/MCL (ref 0–0.9)
EOSINOPHIL NFR BLD AUTO: 16 %
ERYTHROCYTE [DISTWIDTH] IN BLOOD BY AUTOMATED COUNT: 15.4 % (ref 11.5–17)
GFR SERPLBLD CREATININE-BSD FMLA CKD-EPI: >60 ML/MIN/1.73/M2
GLUCOSE SERPL-MCNC: 120 MG/DL (ref 82–115)
HCT VFR BLD AUTO: 35 % (ref 42–52)
HGB BLD-MCNC: 11 G/DL (ref 14–18)
IMM GRANULOCYTES # BLD AUTO: 0.02 X10(3)/MCL (ref 0–0.04)
IMM GRANULOCYTES NFR BLD AUTO: 0.4 %
LYMPHOCYTES # BLD AUTO: 1.51 X10(3)/MCL (ref 0.6–4.6)
LYMPHOCYTES NFR BLD AUTO: 28.4 %
MCH RBC QN AUTO: 26.5 PG (ref 27–31)
MCHC RBC AUTO-ENTMCNC: 31.4 G/DL (ref 33–36)
MCV RBC AUTO: 84.3 FL (ref 80–94)
MONOCYTES # BLD AUTO: 0.55 X10(3)/MCL (ref 0.1–1.3)
MONOCYTES NFR BLD AUTO: 10.4 %
NEUTROPHILS # BLD AUTO: 2.33 X10(3)/MCL (ref 2.1–9.2)
NEUTROPHILS NFR BLD AUTO: 43.9 %
NRBC BLD AUTO-RTO: 0 %
PLATELET # BLD AUTO: 296 X10(3)/MCL (ref 130–400)
PMV BLD AUTO: 10 FL (ref 7.4–10.4)
POTASSIUM SERPL-SCNC: 4 MMOL/L (ref 3.5–5.1)
RBC # BLD AUTO: 4.15 X10(6)/MCL (ref 4.7–6.1)
SODIUM SERPL-SCNC: 142 MMOL/L (ref 136–145)
WBC # BLD AUTO: 5.31 X10(3)/MCL (ref 4.5–11.5)

## 2024-07-25 PROCEDURE — 25000003 PHARM REV CODE 250: Performed by: INTERNAL MEDICINE

## 2024-07-25 PROCEDURE — 36415 COLL VENOUS BLD VENIPUNCTURE: CPT | Performed by: INTERNAL MEDICINE

## 2024-07-25 PROCEDURE — 94760 N-INVAS EAR/PLS OXIMETRY 1: CPT

## 2024-07-25 PROCEDURE — 80048 BASIC METABOLIC PNL TOTAL CA: CPT | Performed by: INTERNAL MEDICINE

## 2024-07-25 PROCEDURE — 99900026 HC AIRWAY MAINTENANCE (STAT)

## 2024-07-25 PROCEDURE — 63600175 PHARM REV CODE 636 W HCPCS: Performed by: STUDENT IN AN ORGANIZED HEALTH CARE EDUCATION/TRAINING PROGRAM

## 2024-07-25 PROCEDURE — 99900022

## 2024-07-25 PROCEDURE — 97162 PT EVAL MOD COMPLEX 30 MIN: CPT

## 2024-07-25 PROCEDURE — 94761 N-INVAS EAR/PLS OXIMETRY MLT: CPT

## 2024-07-25 PROCEDURE — 25000003 PHARM REV CODE 250: Performed by: STUDENT IN AN ORGANIZED HEALTH CARE EDUCATION/TRAINING PROGRAM

## 2024-07-25 PROCEDURE — 21400001 HC TELEMETRY ROOM

## 2024-07-25 PROCEDURE — 63600175 PHARM REV CODE 636 W HCPCS: Performed by: INTERNAL MEDICINE

## 2024-07-25 PROCEDURE — 27000221 HC OXYGEN, UP TO 24 HOURS

## 2024-07-25 PROCEDURE — 27000207 HC ISOLATION

## 2024-07-25 PROCEDURE — 99900031 HC PATIENT EDUCATION (STAT)

## 2024-07-25 PROCEDURE — 99900035 HC TECH TIME PER 15 MIN (STAT)

## 2024-07-25 PROCEDURE — 85025 COMPLETE CBC W/AUTO DIFF WBC: CPT | Performed by: INTERNAL MEDICINE

## 2024-07-25 PROCEDURE — 63600175 PHARM REV CODE 636 W HCPCS: Performed by: NURSE PRACTITIONER

## 2024-07-25 RX ADMIN — METOPROLOL TARTRATE 100 MG: 50 TABLET, FILM COATED ORAL at 08:07

## 2024-07-25 RX ADMIN — BACITRACIN ZINC, NEOMYCIN, POLYMYXIN B: 400; 3.5; 5 OINTMENT TOPICAL at 09:07

## 2024-07-25 RX ADMIN — MICONAZOLE NITRATE 2 % TOPICAL POWDER: at 09:07

## 2024-07-25 RX ADMIN — BUSPIRONE HYDROCHLORIDE 5 MG: 5 TABLET ORAL at 03:07

## 2024-07-25 RX ADMIN — Medication: at 09:07

## 2024-07-25 RX ADMIN — CHOLESTYRAMINE 4 G: 4 POWDER, FOR SUSPENSION ORAL at 08:07

## 2024-07-25 RX ADMIN — DILTIAZEM HYDROCHLORIDE 60 MG: 60 TABLET, FILM COATED ORAL at 01:07

## 2024-07-25 RX ADMIN — PANTOPRAZOLE SODIUM 40 MG: 40 INJECTION, POWDER, LYOPHILIZED, FOR SOLUTION INTRAVENOUS at 09:07

## 2024-07-25 RX ADMIN — AMPICILLIN SODIUM AND SULBACTAM SODIUM 3 G: 2; 1 INJECTION, POWDER, FOR SOLUTION INTRAMUSCULAR; INTRAVENOUS at 05:07

## 2024-07-25 RX ADMIN — AMPICILLIN SODIUM AND SULBACTAM SODIUM 3 G: 2; 1 INJECTION, POWDER, FOR SOLUTION INTRAMUSCULAR; INTRAVENOUS at 09:07

## 2024-07-25 RX ADMIN — LEVETIRACETAM INJECTION 1500 MG: 15 INJECTION INTRAVENOUS at 09:07

## 2024-07-25 RX ADMIN — LOSARTAN POTASSIUM 100 MG: 50 TABLET, FILM COATED ORAL at 09:07

## 2024-07-25 RX ADMIN — BUSPIRONE HYDROCHLORIDE 5 MG: 5 TABLET ORAL at 09:07

## 2024-07-25 RX ADMIN — DILTIAZEM HYDROCHLORIDE 60 MG: 60 TABLET, FILM COATED ORAL at 05:07

## 2024-07-25 RX ADMIN — BACITRACIN ZINC, NEOMYCIN, POLYMYXIN B: 400; 3.5; 5 OINTMENT TOPICAL at 08:07

## 2024-07-25 RX ADMIN — MICONAZOLE NITRATE 2 % TOPICAL POWDER: at 08:07

## 2024-07-25 RX ADMIN — Medication 1 EACH: at 09:07

## 2024-07-25 RX ADMIN — RIVAROXABAN 20 MG: 10 TABLET, FILM COATED ORAL at 03:07

## 2024-07-25 RX ADMIN — METOPROLOL TARTRATE 100 MG: 50 TABLET, FILM COATED ORAL at 09:07

## 2024-07-25 RX ADMIN — Medication: at 08:07

## 2024-07-25 RX ADMIN — BUSPIRONE HYDROCHLORIDE 5 MG: 5 TABLET ORAL at 08:07

## 2024-07-25 RX ADMIN — QUETIAPINE FUMARATE 25 MG: 25 TABLET ORAL at 09:07

## 2024-07-25 RX ADMIN — PANTOPRAZOLE SODIUM 40 MG: 40 INJECTION, POWDER, LYOPHILIZED, FOR SOLUTION INTRAVENOUS at 08:07

## 2024-07-25 RX ADMIN — CHOLESTYRAMINE 4 G: 4 POWDER, FOR SUSPENSION ORAL at 09:07

## 2024-07-25 RX ADMIN — SULFAMETHOXAZOLE AND TRIMETHOPRIM 1 TABLET: 800; 160 TABLET ORAL at 08:07

## 2024-07-25 RX ADMIN — FINASTERIDE 5 MG: 5 TABLET, FILM COATED ORAL at 09:07

## 2024-07-25 RX ADMIN — QUETIAPINE FUMARATE 25 MG: 25 TABLET ORAL at 08:07

## 2024-07-25 RX ADMIN — SULFAMETHOXAZOLE AND TRIMETHOPRIM 1 TABLET: 800; 160 TABLET ORAL at 09:07

## 2024-07-25 NOTE — PROGRESS NOTES
[facility] Hospital Medicine Progress Note        Chief Complaint: Inpatient Follow-up    HPI:     68-year-old  male with significant history of PAF on Xarelto, VFib arrest status post AICD in 2018, MCA CVA with hemorrhagic conversion requiring decompressive hemicraniotomy in November of 2023 with residual aphasia, hemiplegia status post tracheostomy, peg tube placement.  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  Patient is a nursing home resident.  High-volume LP scheduled as outpatient on 07/24/2024 by Dr. Thomas to see if  shunt is warranted.  Patient was brought to the ED on 07/18 from the nursing home for questionable dark emesis.  Noted to be in AFib RVR.  CT head with chronic findings.  Lab significant for leukocytosis, bacteriuria.  Mentation at baseline.  Admitted to hospital medicine services for sepsis secondary to UTI versus tracheobronchitis, urine and respiratory cultures collected.  Concern for new onset seizures for which Keppra initiated, seizure precautions.  Urine cultures negative, but respiratory cultures with Gram-negative rods, ceftriaxone switched to cefepime.  Secretions clearing up on 3% neb.     Cefepime was discontinued as seen on Acinetobacter baumannii sensitivities was multi-drug resistant.  Was started on Unasyn and Bactrim    Interval Hx:   Patient seen and examined this morning started tube feedings tolerating at 80 cc an hour    Objective/physical exam:    General: In no acute distress, afebrile  Chest: Clear to auscultation bilaterally  Heart: S1, S2, no appreciable murmur  Abdomen: Soft, nontender, BS +  MSK: Warm, no lower extremity edema, no clubbing or cyanosis  Neurologic:  Awake, alert    VITAL SIGNS: 24 HRS MIN & MAX LAST   Temp  Min: 97 °F (36.1 °C)  Max: 98.5 °F (36.9 °C) 98.1 °F (36.7 °C)   BP  Min: 132/83  Max: 142/84  132/83   Pulse  Min: 83  Max: 108  84   Resp  Min: 18  Max: 20 18   SpO2  Min: 95 %  Max: 99 % 98 %       Recent Labs   Lab 07/23/24 0354   WBC 6.95   RBC 4.19*   HGB 11.0*   HCT 35.0*   MCV 83.5   MCH 26.3*   MCHC 31.4*   RDW 15.8      MPV 10.0         Recent Labs   Lab 07/18/24  2115 07/19/24  0315 07/20/24 0355 07/21/24  0845 07/22/24  0551 07/23/24 0354   NA  --    < > 142   < > 145 145   K  --    < > 3.2*   < > 3.6 3.2*   CL  --    < > 110*   < > 112* 111*   CO2  --    < > 19*   < > 21* 23   BUN  --    < > 9.9   < > 16.4 12.7   CREATININE  --    < > 0.72*   < > 0.71* 0.75   CALCIUM  --    < > 9.1   < > 8.7* 8.8   PH 7.680*  --   --   --   --   --    MG  --    < > 2.20  --   --   --    ALBUMIN  --    < > 3.6   < > 2.9*  --    ALKPHOS  --    < > 174*   < > 126  --    ALT  --    < > 23   < > 15  --    AST  --    < > 23   < > 15  --    BILITOT  --    < > 0.7   < > 0.7  --     < > = values in this interval not displayed.          Microbiology Results (last 7 days)       Procedure Component Value Units Date/Time    Blood Culture #1 **CANNOT BE ORDERED STAT** [[id number]]  (Normal) Collected: 07/18/24 1812    Order Status: Completed Specimen: Blood Updated: 07/23/24 2100     Blood Culture No Growth at 5 days    Blood Culture #2 **CANNOT BE ORDERED STAT** [[id number]]  (Normal) Collected: 07/18/24 1812    Order Status: Completed Specimen: Blood Updated: 07/23/24 2100     Blood Culture No Growth at 5 days    Respiratory Culture [[id number]]  (Abnormal)  (Susceptibility) Collected: 07/19/24 0258    Order Status: Completed Specimen: Sputum, Expectorated Updated: 07/22/24 1149     Respiratory Culture Many ACINETOBACTER BAUMANNII     Comment: CRAB (Carbapenem-resistant Acinetobacter baumannii)         Many Proteus mirabilis     Comment: with normal respiratory niyah        GRAM STAIN Quality 3+      Moderate Gram negative diplococci      Many Gram Negative Rods      Moderate Gram Positive Rods      Few Gram  positive cocci    Urine culture [[id number]]  (Abnormal)  (Susceptibility) Collected: 07/18/24 5928    Order Status: Completed Specimen: Urine Updated: 07/22/24 0710     Urine Culture >/= 100,000 colonies/ml Klebsiella pneumoniae ssp pneumoniae      >/= 100,000 colonies/ml Enterococcus faecalis     Comment: Ampicillin results may be used to predict susceptibility to amoxicillin-clavulanate, ampicillin-sulbactam, and piperacillin-tazobactam.                Scheduled Med:   ampicillin-sulbactam  3 g Intravenous Q6H    busPIRone  5 mg Per G Tube TID    cholestyramine-aspartame  4 g Per G Tube BID    diltiaZEM  60 mg Per G Tube Q6H    finasteride  5 mg Per G Tube Daily    lactated ringers  500 mL Intravenous Once    Lactobacillus rhamnosus GG  1 packet Per G Tube Daily    levETIRAcetam (Keppra) IV (PEDS and ADULTS)  1,500 mg Intravenous Q12H    losartan  100 mg Per G Tube Daily    metoprolol tartrate  100 mg Per G Tube BID    miconazole NITRATE 2 %   Topical (Top) BID    neomycin-bacitracin-polymyxin   Topical (Top) BID    pantoprazole  40 mg Intravenous Q12H    QUEtiapine  25 mg Per G Tube BID    rivaroxaban  20 mg Per G Tube with dinner    scopolamine  1 patch Transdermal Q3 Days    sulfamethoxazole-trimethoprim 800-160mg  1 tablet Per G Tube BID    zinc oxide-cod liver oil   Topical (Top) BID          Assessment/Plan:    Acute tracheobronchitis secondary to Proteus/Acinetobacter  Acute hypoxemic respiratory failure secondary to above  Sepsis secondary to above  Bacteriuria-likely chronic colonization  New onset seizure disorder  PAF with intermittent RVR  PEG/trach dependent  Peg tube malfunction/tube feeding intolerance  History of VFib arrest status post AICD in 2018  History of MCA CVA with hemorrhagic conversion requiring right frontotemporal decompressive hemicraniotomy, aphasic/left hemiplegic   Ventriculomegaly secondary to suspected hydrocephalus  History of neuroendocrine cancer of the small bowel status  post resection in 2018   HTN   HLD   History of CAD  Prophylaxis    Continue with supplemental oxygen   Continue Unasyn and Bactrim today's day for plan for total of 7 days of antibiotics  Respiratory cultures-Acinetobacter, Proteus ; Acinetobacter baumannii shown to be multi-drug resistant   Continue 3% nebs as needed to clear up secretions  Supplemental oxygen via trach to maintain saturations more than 90% , on 2 L  Urine cultures  positive for Klebsiella pneumoniae and Enterococcus faecalis,  Bactrim and Unasyn should cover  Keep p.o. Cardizem, p.o. metoprolol tartrate   On p.r.n. IV Cardizem and p.r.n. IV metoprolol   As per Neurosurgery new want to postpone any invasive procedure indefinitely and recommend outpatient follow-up in Neurosurgery Clinic with CT of the head in 3 months as patient is at his neurological baseline nonverbal with left-sided hemiparesis  Seizures now controlled on Keppra   EEG done findings consistent with prior craniotomy.  No active seizure  In case of recurrence will consult Neurology  Few episodes of diarrhea which improved with Questran, questionable dark emesis prior to transfer from nursing home, no more here and hemoglobin is stable, hold off on GI evaluation  Xarelto was resumed on July 20th H&H is stable  Tolerating tube feedings  Will consult PT and OT  Reviewed lab work for today that looks stable    Continue other home meds-Seroquel, losartan, finasteride, BuSpar     DVT prophylaxis-on Xarelto          Taylor peacock MD  Department of Hospital Medicine  [facility]  07/25/2024

## 2024-07-25 NOTE — PT/OT/SLP EVAL
Physical Therapy Evaluation and Discharge Note    Patient Name:  Delio Daniel Jr.   MRN:  [id number]    Recommendations:     Discharge therapy intensity:   Moderate Intensity therapy (return to NH)  Discharge Equipment Recommendations:   to be determined by next level of care  Barriers to discharge: Impaired mobility and Ongoing medical needs    Assessment:     Delio Daniel Jr. is a 69 y.o. male admitted with a medical diagnosis of aspiration of gastric contents. Pt with PMHx of MCA CVA with hemorrhagic conversion requiring right frontotemporal decompressive hemicraniotomy, aphasic/left hemiplegic. Today, pt unable to answer questions secondary to aphasia. Pt able to squeeze R hand on command; unable to squeeze L hand. Pt with increased tone on LLE. Pt with spontaneous movement of RLE. Pt unable to wiggle B toes on command. No purposeful movement noted. Pt with +BM upon entry; pericare performed which required total assistance of 2 ppl to clean. Did not mobilize pt further. Per nursing, pt is functioning at baseline. PT signing off at this time.     Recent Surgery: * No surgery found *      Plan:     During this hospitalization, patient does not require further acute PT services.  Please re-consult if situation changes.      Subjective     Chief Complaint: unable to verbalize   Patient/Family Comments/goals: unable to verbalize   Pain/Comfort:   Unable to verbalize     Patients cultural, spiritual, Yazidism conflicts given the current situation: no    Living Environment:  Pt lived in NH per nursing. Pt unable to verbalize.   Prior to admission, patients level of function was dependent  Equipment used at home: unknown .  DME owned (not currently used):  unknown .  Upon discharge, patient will have assistance from NH.    Objective:     Patient found HOB elevated with PEG Tube, pressure relief boots, pulse ox (continuous), telemetry, Tracheostomy upon PT entry to room.    General Precautions: Standard, contact     Orthopedic Precautions:    Braces:    Respiratory Status:  t-piece  Blood Pressure:     Exams:  Cognitive Exam:  Pt unable to verbalize.   LLE spastic tone  RLE spontanous movement noted   Pt able to squeeze R hand with command, pt unable to squeeze L hand on command     Functional Mobility:  Bed Mobility:     Rolling Left:  total assistance  Rolling Right: total assistance    AM-PAC 6 CLICK MOBILITY  Total Score:        Treatment and Education:      Patient provided with verbal education education regarding PT role/goals/POC.   Unable to verbalize understanding.       Patient left HOB elevated with all lines intact, call button in reach, wedge under R side, pressure relief boots, and RN notified.    GOALS:   Multidisciplinary Problems       Physical Therapy Goals       Not on file                    History:     Past Medical History:   Diagnosis Date    Arthritis     Atrial fibrillation     BPH (benign prostatic hyperplasia)     Cardiac arrest     Coronary artery disease     Cyst, kidney, acquired     Diverticulosis     Hyperlipidemia     Hypertension     MI (myocardial infarction)     Obesity     Steatosis of liver     Stroke        Past Surgical History:   Procedure Laterality Date    A-V CARDIAC PACEMAKER INSERTION Right     CARDIAC CATHETERIZATION      COLONOSCOPY W/ BIOPSIES      CRANIECTOMY Right 12/20/2023    Procedure: CRANIECTOMY;  Surgeon: Artem Can MD;  Location: [facility];  Service: Neurosurgery;  Laterality: Right;    ESOPHAGOGASTRODUODENOSCOPY W/ PEG N/A 1/2/2024    Procedure: PEG;  Surgeon: Tani Day MD;  Location: [facility] ENDOSCOPY;  Service: Gastroenterology;  Laterality: N/A;    excision of colon      TRACHEOSTOMY N/A 12/29/2023    Procedure: CREATION, TRACHEOSTOMY;  Surgeon: Patricia Winslow MD;  Location: [facility];  Service: ENT;  Laterality: N/A;  REQ 1130 //  NEEDS 2 SCRUBS       Time Tracking:     PT Received On: 07/25/24  PT Start Time: 1503     PT Stop Time: 1523  PT Total  Time (min): 20 min     Billable Minutes: Evaluation Mod      07/25/2024

## 2024-07-25 NOTE — PLAN OF CARE
Problem: Adult Inpatient Plan of Care  Goal: Plan of Care Review  Outcome: Progressing  Goal: Patient-Specific Goal (Individualized)  Outcome: Progressing  Goal: Absence of Hospital-Acquired Illness or Injury  Outcome: Progressing  Goal: Optimal Comfort and Wellbeing  Outcome: Progressing  Goal: Readiness for Transition of Care  Outcome: Progressing     Problem: Skin Injury Risk Increased  Goal: Skin Health and Integrity  Outcome: Progressing     Problem: Infection  Goal: Absence of Infection Signs and Symptoms  Outcome: Progressing     Problem: Wound  Goal: Optimal Coping  Outcome: Progressing  Goal: Optimal Functional Ability  Outcome: Progressing  Goal: Absence of Infection Signs and Symptoms  Outcome: Progressing  Goal: Improved Oral Intake  Outcome: Progressing  Goal: Optimal Pain Control and Function  Outcome: Progressing  Goal: Skin Health and Integrity  Outcome: Progressing  Goal: Optimal Wound Healing  Outcome: Progressing

## 2024-07-26 VITALS
HEART RATE: 106 BPM | BODY MASS INDEX: 37.22 KG/M2 | HEIGHT: 70 IN | TEMPERATURE: 99 F | RESPIRATION RATE: 18 BRPM | SYSTOLIC BLOOD PRESSURE: 148 MMHG | WEIGHT: 260 LBS | DIASTOLIC BLOOD PRESSURE: 89 MMHG | OXYGEN SATURATION: 98 %

## 2024-07-26 LAB
ANION GAP SERPL CALC-SCNC: 6 MEQ/L
BASOPHILS # BLD AUTO: 0.05 X10(3)/MCL
BASOPHILS NFR BLD AUTO: 0.8 %
BUN SERPL-MCNC: 19 MG/DL (ref 8.4–25.7)
CALCIUM SERPL-MCNC: 8.5 MG/DL (ref 8.8–10)
CHLORIDE SERPL-SCNC: 113 MMOL/L (ref 98–107)
CO2 SERPL-SCNC: 24 MMOL/L (ref 23–31)
CREAT SERPL-MCNC: 0.65 MG/DL (ref 0.73–1.18)
CREAT/UREA NIT SERPL: 29
EOSINOPHIL # BLD AUTO: 0.66 X10(3)/MCL (ref 0–0.9)
EOSINOPHIL NFR BLD AUTO: 10.8 %
ERYTHROCYTE [DISTWIDTH] IN BLOOD BY AUTOMATED COUNT: 15.7 % (ref 11.5–17)
GFR SERPLBLD CREATININE-BSD FMLA CKD-EPI: >60 ML/MIN/1.73/M2
GLUCOSE SERPL-MCNC: 114 MG/DL (ref 82–115)
HCT VFR BLD AUTO: 36.6 % (ref 42–52)
HGB BLD-MCNC: 11.2 G/DL (ref 14–18)
IMM GRANULOCYTES # BLD AUTO: 0.05 X10(3)/MCL (ref 0–0.04)
IMM GRANULOCYTES NFR BLD AUTO: 0.8 %
LYMPHOCYTES # BLD AUTO: 1.73 X10(3)/MCL (ref 0.6–4.6)
LYMPHOCYTES NFR BLD AUTO: 28.2 %
MCH RBC QN AUTO: 26.3 PG (ref 27–31)
MCHC RBC AUTO-ENTMCNC: 30.6 G/DL (ref 33–36)
MCV RBC AUTO: 85.9 FL (ref 80–94)
MONOCYTES # BLD AUTO: 0.63 X10(3)/MCL (ref 0.1–1.3)
MONOCYTES NFR BLD AUTO: 10.3 %
NEUTROPHILS # BLD AUTO: 3.01 X10(3)/MCL (ref 2.1–9.2)
NEUTROPHILS NFR BLD AUTO: 49.1 %
NRBC BLD AUTO-RTO: 0 %
PLATELET # BLD AUTO: 269 X10(3)/MCL (ref 130–400)
PMV BLD AUTO: 10.3 FL (ref 7.4–10.4)
POTASSIUM SERPL-SCNC: 3.8 MMOL/L (ref 3.5–5.1)
RBC # BLD AUTO: 4.26 X10(6)/MCL (ref 4.7–6.1)
SODIUM SERPL-SCNC: 143 MMOL/L (ref 136–145)
WBC # BLD AUTO: 6.13 X10(3)/MCL (ref 4.5–11.5)

## 2024-07-26 PROCEDURE — 85025 COMPLETE CBC W/AUTO DIFF WBC: CPT | Performed by: INTERNAL MEDICINE

## 2024-07-26 PROCEDURE — 80048 BASIC METABOLIC PNL TOTAL CA: CPT | Performed by: INTERNAL MEDICINE

## 2024-07-26 PROCEDURE — 25000003 PHARM REV CODE 250: Performed by: INTERNAL MEDICINE

## 2024-07-26 PROCEDURE — 63600175 PHARM REV CODE 636 W HCPCS: Performed by: STUDENT IN AN ORGANIZED HEALTH CARE EDUCATION/TRAINING PROGRAM

## 2024-07-26 PROCEDURE — 63600175 PHARM REV CODE 636 W HCPCS: Performed by: NURSE PRACTITIONER

## 2024-07-26 PROCEDURE — 94760 N-INVAS EAR/PLS OXIMETRY 1: CPT

## 2024-07-26 PROCEDURE — 99900035 HC TECH TIME PER 15 MIN (STAT)

## 2024-07-26 PROCEDURE — 63600175 PHARM REV CODE 636 W HCPCS: Performed by: INTERNAL MEDICINE

## 2024-07-26 PROCEDURE — 25000003 PHARM REV CODE 250: Performed by: STUDENT IN AN ORGANIZED HEALTH CARE EDUCATION/TRAINING PROGRAM

## 2024-07-26 PROCEDURE — 99900026 HC AIRWAY MAINTENANCE (STAT)

## 2024-07-26 PROCEDURE — 27000221 HC OXYGEN, UP TO 24 HOURS

## 2024-07-26 PROCEDURE — 36415 COLL VENOUS BLD VENIPUNCTURE: CPT | Performed by: INTERNAL MEDICINE

## 2024-07-26 RX ORDER — AMOXICILLIN AND CLAVULANATE POTASSIUM 875; 125 MG/1; MG/1
1 TABLET, FILM COATED ORAL 2 TIMES DAILY
Qty: 6 TABLET | Refills: 0 | Status: SHIPPED | OUTPATIENT
Start: 2024-07-26 | End: 2024-07-29

## 2024-07-26 RX ORDER — SULFAMETHOXAZOLE AND TRIMETHOPRIM 800; 160 MG/1; MG/1
1 TABLET ORAL 2 TIMES DAILY
Qty: 6 TABLET | Refills: 0 | Status: SHIPPED | OUTPATIENT
Start: 2024-07-26 | End: 2024-07-29

## 2024-07-26 RX ADMIN — QUETIAPINE FUMARATE 25 MG: 25 TABLET ORAL at 08:07

## 2024-07-26 RX ADMIN — SULFAMETHOXAZOLE AND TRIMETHOPRIM 1 TABLET: 800; 160 TABLET ORAL at 08:07

## 2024-07-26 RX ADMIN — BACITRACIN ZINC, NEOMYCIN, POLYMYXIN B: 400; 3.5; 5 OINTMENT TOPICAL at 08:07

## 2024-07-26 RX ADMIN — DILTIAZEM HYDROCHLORIDE 60 MG: 60 TABLET, FILM COATED ORAL at 12:07

## 2024-07-26 RX ADMIN — BUSPIRONE HYDROCHLORIDE 5 MG: 5 TABLET ORAL at 08:07

## 2024-07-26 RX ADMIN — LOSARTAN POTASSIUM 100 MG: 50 TABLET, FILM COATED ORAL at 08:07

## 2024-07-26 RX ADMIN — LEVETIRACETAM INJECTION 1500 MG: 15 INJECTION INTRAVENOUS at 08:07

## 2024-07-26 RX ADMIN — PANTOPRAZOLE SODIUM 40 MG: 40 INJECTION, POWDER, LYOPHILIZED, FOR SOLUTION INTRAVENOUS at 08:07

## 2024-07-26 RX ADMIN — AMPICILLIN SODIUM AND SULBACTAM SODIUM 3 G: 2; 1 INJECTION, POWDER, FOR SOLUTION INTRAMUSCULAR; INTRAVENOUS at 01:07

## 2024-07-26 RX ADMIN — DILTIAZEM HYDROCHLORIDE 60 MG: 60 TABLET, FILM COATED ORAL at 11:07

## 2024-07-26 RX ADMIN — DILTIAZEM HYDROCHLORIDE 60 MG: 60 TABLET, FILM COATED ORAL at 06:07

## 2024-07-26 RX ADMIN — FINASTERIDE 5 MG: 5 TABLET, FILM COATED ORAL at 08:07

## 2024-07-26 RX ADMIN — CHOLESTYRAMINE 4 G: 4 POWDER, FOR SUSPENSION ORAL at 08:07

## 2024-07-26 RX ADMIN — AMPICILLIN SODIUM AND SULBACTAM SODIUM 3 G: 2; 1 INJECTION, POWDER, FOR SOLUTION INTRAMUSCULAR; INTRAVENOUS at 04:07

## 2024-07-26 RX ADMIN — Medication: at 08:07

## 2024-07-26 RX ADMIN — Medication 1 EACH: at 08:07

## 2024-07-26 RX ADMIN — MICONAZOLE NITRATE 2 % TOPICAL POWDER: at 08:07

## 2024-07-26 RX ADMIN — AMPICILLIN SODIUM AND SULBACTAM SODIUM 3 G: 2; 1 INJECTION, POWDER, FOR SOLUTION INTRAMUSCULAR; INTRAVENOUS at 11:07

## 2024-07-26 RX ADMIN — RIVAROXABAN 20 MG: 10 TABLET, FILM COATED ORAL at 04:07

## 2024-07-26 RX ADMIN — BUSPIRONE HYDROCHLORIDE 5 MG: 5 TABLET ORAL at 04:07

## 2024-07-26 RX ADMIN — METOPROLOL TARTRATE 100 MG: 50 TABLET, FILM COATED ORAL at 08:07

## 2024-07-26 RX ADMIN — AMPICILLIN SODIUM AND SULBACTAM SODIUM 3 G: 2; 1 INJECTION, POWDER, FOR SOLUTION INTRAMUSCULAR; INTRAVENOUS at 06:07

## 2024-07-26 NOTE — PLAN OF CARE
07/26/24 1153   Final Note   Assessment Type Final Discharge Note   Anticipated Discharge Disposition [facility]   Post-Acute Status   Post-Acute Authorization Placement   Post-Acute Placement Status Set-up Complete/Auth obtained   Discharge Delays None known at this time     Patient denied [facility] ([facility] [place]) Patient is medically ready for discharge and will return to [facility]. NH can continue to work on transfer to a facility in [place]. Transport arranged with [facility].

## 2024-07-26 NOTE — DISCHARGE SUMMARY
[facility] Hospital Medicine Discharge Summary    Admit Date: 7/18/2024  Discharge Date and Time: 7/26/202410:40 AM  Admitting Physician:  Team  Discharging Physician: Taylor Márquez MD.  Primary Care Physician: Jl Briones MD  Consults: Neurosurgery    Discharge Diagnoses:  Acute tracheobronchitis secondary to Proteus/Acinetobacter  Acute hypoxemic respiratory failure secondary to above  Sepsis secondary to above  Bacteriuria-likely chronic colonization  New onset seizure disorder  PAF with intermittent RVR  PEG/trach dependent  Peg tube malfunction/tube feeding intolerance  History of VFib arrest status post AICD in 2018  History of MCA CVA with hemorrhagic conversion requiring right frontotemporal decompressive hemicraniotomy, aphasic/left hemiplegic   Ventriculomegaly secondary to suspected hydrocephalus  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  Patient is a nursing home resident.  High-volume LP scheduled as outpatient on 07/24/2024 by Dr. Thomas to see if  shunt is warranted.  Patient was brought to the ED on 07/18 from the nursing home for questionable dark emesis.  Noted to be in AFib RVR.  CT head with chronic findings.  Lab significant for leukocytosis, bacteriuria.  Mentation at baseline.  Admitted to hospital medicine services for sepsis secondary to UTI versus tracheobronchitis, urine and respiratory cultures collected.  Concern for new onset  seizures for which Keppra initiated, seizure precautions.  Urine cultures negative, but respiratory cultures with Gram-negative rods, ceftriaxone switched to cefepime.  Secretions clearing up on 3% neb.    Cefepime was discontinued as seen on Acinetobacter baumannii sensitivities was multi-drug resistant.  Was started on Unasyn and Bactrim.  Neurosurgery was consulted they wanted to postpone any invasive procedure indefinitely and recommend outpatient follow up in Neurosurgery Clinic.  Seizures were controlled patient had few episodes of diarrhea that improved with Questran.  Patient was tolerating tube feedings so patient was discharged back to nursing home on p.o. antibiotics  Pt was seen and examined on the day of discharge  Vitals:  VITAL SIGNS: 24 HRS MIN & MAX LAST   Temp  Min: 98 °F (36.7 °C)  Max: 99 °F (37.2 °C) 98.6 °F (37 °C)   BP  Min: 100/66  Max: 138/86 131/87   Pulse  Min: 88  Max: 95  95   Resp  Min: 18  Max: 18 18   SpO2  Min: 96 %  Max: 100 % 100 %       Physical Exam:  General: In no acute distress, afebrile  Chest: Clear to auscultation bilaterally  Heart: S1, S2, no appreciable murmur  Abdomen: Soft, nontender, BS +  MSK: Warm, no lower extremity edema, no clubbing or cyanosis  Neurologic:  Awake, alert    Procedures Performed: No admission procedures for hospital encounter.     Significant Diagnostic Studies: See Full reports for all details    Recent Labs   Lab 07/23/24  0354 07/25/24  0728 07/26/24  0444   WBC 6.95 5.31 6.13   RBC 4.19* 4.15* 4.26*   HGB 11.0* 11.0* 11.2*   HCT 35.0* 35.0* 36.6*   MCV 83.5 84.3 85.9   MCH 26.3* 26.5* 26.3*   MCHC 31.4* 31.4* 30.6*   RDW 15.8 15.4 15.7    296 269   MPV 10.0 10.0 10.3       Recent Labs   Lab 07/20/24  0355 07/21/24  0845 07/22/24  0551 07/23/24  0354 07/25/24  0728 07/26/24  0444    142 145 145 142 143   K 3.2* 4.5 3.6 3.2* 4.0 3.8   * 112* 112* 111* 113* 113*   CO2 19* 20* 21* 23 25 24   BUN 9.9 20.3 16.4 12.7 16.3 19.0    CREATININE 0.72* 0.70* 0.71* 0.75 0.65* 0.65*   CALCIUM 9.1 7.9* 8.7* 8.8 8.4* 8.5*   MG 2.20  --   --   --   --   --    ALBUMIN 3.6 3.1* 2.9*  --   --   --    ALKPHOS 174* 143 126  --   --   --    ALT 23 18 15  --   --   --    AST 23 20 15  --   --   --    BILITOT 0.7 0.7 0.7  --   --   --         Microbiology Results (last 7 days)       Procedure Component Value Units Date/Time    Blood Culture #1 **CANNOT BE ORDERED STAT** [[id number]]  (Normal) Collected: 07/18/24 1812    Order Status: Completed Specimen: Blood Updated: 07/23/24 2100     Blood Culture No Growth at 5 days    Blood Culture #2 **CANNOT BE ORDERED STAT** [[id number]]  (Normal) Collected: 07/18/24 1812    Order Status: Completed Specimen: Blood Updated: 07/23/24 2100     Blood Culture No Growth at 5 days    Respiratory Culture [[id number]]  (Abnormal)  (Susceptibility) Collected: 07/19/24 0258    Order Status: Completed Specimen: Sputum, Expectorated Updated: 07/22/24 1149     Respiratory Culture Many ACINETOBACTER BAUMANNII     Comment: CRAB (Carbapenem-resistant Acinetobacter baumannii)         Many Proteus mirabilis     Comment: with normal respiratory niyah        GRAM STAIN Quality 3+      Moderate Gram negative diplococci      Many Gram Negative Rods      Moderate Gram Positive Rods      Few Gram positive cocci    Urine culture [[id number]]  (Abnormal)  (Susceptibility) Collected: 07/18/24 1648    Order Status: Completed Specimen: Urine Updated: 07/22/24 0710     Urine Culture >/= 100,000 colonies/ml Klebsiella pneumoniae ssp pneumoniae      >/= 100,000 colonies/ml Enterococcus faecalis     Comment: Ampicillin results may be used to predict susceptibility to amoxicillin-clavulanate, ampicillin-sulbactam, and piperacillin-tazobactam.                CT Head Without Contrast  Narrative: EXAMINATION:  CT HEAD WITHOUT CONTRAST    CLINICAL HISTORY:  Implant planning;    TECHNIQUE:  CT imaging of the head and neck performed according to a planning  protocol.  DLP 1255 mGycm. Automatic exposure control, adjustment of mA/kV or iterative reconstruction technique was used to reduce radiation.    COMPARISON:  18 July 2024    FINDINGS:  Perhaps slightly increased herniation of the brain through the craniectomy defect.  No new parenchymal attenuation abnormality.  No acute hemorrhage seen.  Little if any midline shift.  Similar ventricular enlargement.  There are vascular calcifications.  Impression: CT for surgical planning.  Perhaps slightly increased herniation of the brain through the craniectomy defect.    Electronically signed by: Tani Calderon  Date:    07/23/2024  Time:    17:16         Medication List        START taking these medications      amoxicillin-clavulanate 875-125mg 875-125 mg per tablet  Commonly known as: AUGMENTIN  Take 1 tablet by mouth 2 (two) times daily. for 3 days     sulfamethoxazole-trimethoprim 800-160mg 800-160 mg Tab  Commonly known as: BACTRIM DS  1 tablet by Per G Tube route 2 (two) times daily. for 3 days            CHANGE how you take these medications      famotidine 20 MG tablet  Commonly known as: PEPCID  1 tablet (20 mg total) by Per G Tube route 2 (two) times daily.  What changed: when to take this            CONTINUE taking these medications      ascorbic Acid 500 mg Cpsr  Commonly known as: VITAMIN C     busPIRone 5 MG Tab  Commonly known as: BUSPAR     cholestyramine 4 gram packet  Commonly known as: QUESTRAN     cholestyramine-aspartame 4 gram Pwpk  Commonly known as: QUESTRAN LIGHT  1 packet (4 g total) by Per G Tube route 2 (two) times daily.     cloNIDine 0.1 MG tablet  Commonly known as: CATAPRES     diltiaZEM 60 MG tablet  Commonly known as: CARDIZEM     finasteride 5 mg tablet  Commonly known as: PROSCAR  Take 1 tablet (5 mg total) by mouth once daily.     FLORANEX ORAL     furosemide 80 MG tablet  Commonly known as: LASIX     hydrALAZINE 50 MG tablet  Commonly known as: APRESOLINE     levetiracetam 500 mg/5 mL (5  mL) Soln     LIPITOR 10 mg tablet  Generic drug: atorvastatin     losartan 100 MG tablet  Commonly known as: COZAAR     metoprolol tartrate 100 MG tablet  Commonly known as: LOPRESSOR     multivitamin per tablet  Commonly known as: THERAGRAN     PROMOD PROTEIN ORAL     QUEtiapine 25 MG Tab  Commonly known as: SEROQUEL     scopolamine 1.3-1.5 mg (1 mg over 3 days)  Commonly known as: TRANSDERM-SCOP     tamsulosin 0.4 mg Cap  Commonly known as: FLOMAX  Take 1 capsule (0.4 mg total) by mouth every evening.     venlafaxine 75 mg Tr24     XARELTO 20 mg Tab  Generic drug: rivaroxaban            STOP taking these medications      KLOR-CON M15 15 MEQ tablet  Generic drug: potassium chloride SA               Where to Get Your Medications        These medications [facility] [place] [facility] [address]  [address], [facility] [address]      Phone: [phone]   amoxicillin-clavulanate 875-125mg 875-125 mg per tablet  sulfamethoxazole-trimethoprim 800-160mg 800-160 mg Tab          Explained in detail to the patient about the discharge plan, medications, and follow-up visits. Pt understands and agrees with the treatment plan  Discharge Disposition: Skilled Nursing Facility   Discharged Condition: stable  Diet-   Dietary Orders (From admission, onward)       Start     Ordered    07/19/24 2000  Tube Feedings/Formulas Other (see comments); Gastrostomy; Patel - Orange; 2 times daily  Every 12 hours      Question Answer Comment   Select Formula: Other (see comments)    Route: Gastrostomy    Additives/Modulars: Patel - Orange    Additives/Modulars frequency: 2 times daily        07/19/24 1249    07/19/24 1248  Tube Feedings/Formulas 80; 1,600; Impact Peptide 1.5; Gastrostomy (substitute with Pivot 1.5 as needed; start at 20 ml/hr and advance by 20 ml/hr every 4 hours as tolerated); 30; Every 4 hours  Continuous        Question Answer Comment   Formula Rate (mL/hr): 80    Feeding  Volume (mL): 1600    Select Formula: Impact Peptide 1.5    Route: Gastrostomy substitute with Pivot 1.5 as needed; start at 20 ml/hr and advance by 20 ml/hr every 4 hours as tolerated   Free water flush volume (mL): 30    Free water flush frequency: Every 4 hours        07/19/24 1249                   Medications Per DC med rec  Activities as tolerated   Follow-up Information       Jl Briones MD Follow up in 1 week(s).    Specialty: Family Medicine  Contact information:  [address] [place] [id number]  [phone]               Hammad Diallo MD Follow up in 3 month(s).    Specialty: Neurosurgery  Contact information:  [address] [place] [address] Dr Surjit SUE [id number] [phone]                           For further questions contact hospitalist office    Discharge time 33 minutes    For worsening symptoms, chest pain, shortness of breath, increased abdominal pain, high grade fever, stroke or stroke like symptoms, immediately go to the nearest Emergency Room or call 911 as soon as possible.      Taylor Montana M.D, on 7/26/2024. at 10:40 AM.

## 2024-07-26 NOTE — NURSING
Nurses Note -- 4 Eyes      7/26/2024   1:04 PM      Skin assessed during: Q Shift Change      [] No Altered Skin Integrity Present    []Prevention Measures Documented      [x] Yes- Altered Skin Integrity Present or Discovered   [] LDA Added if Not in Epic (Describe Wound)   [] New Altered Skin Integrity was Present on Admit and Documented in LDA   [] Wound Image Taken    Wound Care Consulted? No    Attending Nurse:  Emeka Johnson RN/Staff Member: Jovana

## 2024-07-26 NOTE — PROGRESS NOTES
Inpatient Nutrition Assessment    Admit Date: 7/18/2024   Total duration of encounter: 8 days   Patient Age: 69 y.o.    Nutrition Recommendation/Prescription     Tube feeding as tolerated:  Impact Peptide 1.5 goal rate 80 ml/hr and Patel BID to provide (calculations based on estimated 20 hr/d run time)   2580 kcal, 100% needs  155 g protein, 100% needs  230 g carbohydrate, 100% needs  1232 ml free water, 50% needs, will require additional fluid source, can be given as 60 ml/hr water flush when IV fluid stopped    Communication of Recommendations: reviewed with nurse    Nutrition Assessment     Malnutrition Assessment/Nutrition-Focused Physical Exam    Malnutrition Context: acute illness or injury (07/19/24 1243)  Malnutrition Level:  (does not meet criteria) (07/19/24 1243)  Energy Intake (Malnutrition):  (unable to obtain) (07/19/24 1243)  Weight Loss (Malnutrition):  (none per malnutrition screen or weight history, unable to verify) (07/19/24 1243)  Subcutaneous Fat (Malnutrition):  (none noted) (07/19/24 1243)           Muscle Mass (Malnutrition):  (none noted) (07/19/24 1243)                                   A minimum of two characteristics is recommended for diagnosis of either severe or non-severe malnutrition.    Chart Review    Reason Seen: follow-up    Malnutrition Screening Tool Results   Have you recently lost weight without trying?: No  Have you been eating poorly because of a decreased appetite?: No   MST Score: 0   Diagnosis:  Sepsis, secondary to bronchitis and possible UTI  Afib, RVR  Acute on chronic respiratory alkalosis  Stable ventriculomegaly/hydrocephalus awaiting high volume LP on 7/24/24  MCA CVA with hemorrhagic conversion status post right frontotemporal decompressive hemicraniectomy in November of 2023 with residual aphasia and left hemiplegia  Seizure disorder  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  Essential HTN  Hyperlipidemia  CAD  Hx Enterobacter cloacea  UTI  Hx Vfib Arrest s/p AICD placement  Aphasia status post tracheostomy on 12/20/2023 and PEG tube placement on 01/02/2024    Relevant Medical History: HTN, Afib, on Xarelto, Hx Vfib arrest s/p AICD in 2018, neuroendocrine carcinoma of the small bowel s/p bowel resection in 2018, MCA CVA s/p right ICA thrombectomy with hemorrhagic conversion right frontotemporal decompressive hemicraniectomy on 12/20/23 with residual aphasia and spastic left hemiplegia, seizure disorder, trache/peg dependent, 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 with plans to undergo high volume LP on 7/24/24 to determine if  shunt is warranted     Scheduled Medications:  ampicillin-sulbactam, 3 g, Q6H  busPIRone, 5 mg, TID  cholestyramine-aspartame, 4 g, BID  diltiaZEM, 60 mg, Q6H  finasteride, 5 mg, Daily  lactated ringers, 500 mL, Once  Lactobacillus rhamnosus GG, 1 packet, Daily  levETIRAcetam (Keppra) IV (PEDS and ADULTS), 1,500 mg, Q12H  losartan, 100 mg, Daily  metoprolol tartrate, 100 mg, BID  miconazole NITRATE 2 %, , BID  neomycin-bacitracin-polymyxin, , BID  pantoprazole, 40 mg, Q12H  QUEtiapine, 25 mg, BID  rivaroxaban, 20 mg, with dinner  scopolamine, 1 patch, Q3 Days  sulfamethoxazole-trimethoprim 800-160mg, 1 tablet, BID  zinc oxide-cod liver oil, , BID    Continuous Infusions: none       PRN Medications:   acetaminophen, 1,000 mg, Q6H PRN  albuterol-ipratropium, 3 mL, Q6H PRN  cloNIDine, 0.1 mg, Q8H PRN  diltiaZEM, 10 mg, Q1H PRN  metoprolol, 5 mg, Q6H PRN  ondansetron, 4 mg, Q6H PRN  sodium chloride 3%, 4 mL, Q6H PRN    Calorie Containing IV Medications: no significant kcals from medications at this time    Recent Labs   Lab 07/20/24  0355 07/21/24  0845 07/22/24  0551 07/23/24  0354 07/25/24  0728 07/26/24  0444    142 145 145 142 143   K 3.2* 4.5 3.6 3.2* 4.0 3.8   CALCIUM 9.1 7.9* 8.7* 8.8 8.4* 8.5*   MG 2.20  --   --   --   --   --    CO2 19* 20* 21* 23 25 24   BUN 9.9 20.3 16.4 12.7 16.3 19.0   CREATININE 0.72*  0.70* 0.71* 0.75 0.65* 0.65*   EGFRNORACEVR >60 >60 >60 >60 >60 >60   GLUCOSE 113 102 90 78* 120* 114   BILITOT 0.7 0.7 0.7  --   --   --    ALKPHOS 174* 143 126  --   --   --    ALT 23 18 15  --   --   --    AST 23 20 15  --   --   --    ALBUMIN 3.6 3.1* 2.9*  --   --   --    WBC 9.65 9.44 8.79 6.95 5.31 6.13   HGB 12.4* 12.0* 11.0* 11.0* 11.0* 11.2*   HCT 38.0* 38.6* 34.8* 35.0* 35.0* 36.6*     Nutrition Orders:  No diet orders on file  Tube Feedings/Formulas 80; 1,600; Impact Peptide 1.5; Gastrostomy (substitute with Pivot 1.5 as needed; start at 20 ml/hr and advance by 20 ml/hr every 4 hours as tolerated); 30; Every 4 hours,Tube Feedings/Formulas Other (see comments); Gastrostomy; Patel - Orange; 2 times daily    Appetite/Oral Intake: NPO/not applicable  Factors Affecting Nutritional Intake: difficulty/impaired swallowing, NPO, and tracheostomy  Social Needs Impacting Access to Food: none identified  Food/Episcopalian/Cultural Preferences: unable to obtain  Food Allergies: no known food allergies  Last Bowel Movement: 07/26/24  Wound(s):     Wound 07/19/24 1000 Other (comment) Scrotum-Tissue loss description: Not applicable       Wound 07/19/24 1000 Abrasion(s) Left anterior Knee-Tissue loss description: Partial thickness       Wound 07/19/24 1000 Pressure Injury Left lateral Malleolus-Tissue loss description: Partial thickness       Altered Skin Integrity 02/26/24 1100 lower Buttocks Moisture associated dermatitis-Tissue loss description: Not applicable    Comments    7/19/24 Patient unable to provide information, discussed with physician, ok to start tube feeding, will provide orders. He is on Vital 1.5 at nursing home per notes, will order Impact Peptide (substitute with Pivot 1.5 as needed due to formula shortage); also add Patel for wound healing.     7/23/24 Patient developed vomiting with tube feeding, held without any vomiting for 2 days, plans to resume today at 10 ml/hr per GI and advance by 10 ml every 4  "hours. He is on Clinimix 4.25/5 at 75 ml/hr, if inability to tolerate tube feeding persists, may need to consider central venous access for TPN.    7/26/24 Tube feeding at goal, plans for discharge to nursing home today.    Anthropometrics    Height: 5' 10" (177.8 cm), Height Method: Stated  Last Weight: 117.9 kg (260 lb) (07/18/24 1408), Weight Method: Stated  BMI (Calculated): 37.3  BMI Classification: obese grade II (BMI 35-39.9)        Ideal Body Weight (IBW), Male: 166 lb     % Ideal Body Weight, Male (lb): 156.63 %                          Usual Weight Provided By: unable to obtain usual weight    Wt Readings from Last 5 Encounters:   07/18/24 117.9 kg (260 lb)   06/25/24 90.7 kg (200 lb)   05/15/24 90.7 kg (200 lb)   02/25/24 95.6 kg (210 lb 12.2 oz)   01/20/24 117.9 kg (260 lb)     Weight Change(s) Since Admission:   (7/19) admission weight (117.9 kg) documented as stated, unable to confirm on bed scale during rounds (bed would not weigh in current position and nurse reports respiratory decline when lays flat), documented weight appears like it may be higher than patient actually weighs but unable to confirm  Wt Readings from Last 1 Encounters:   07/18/24 1408 117.9 kg (260 lb)   Admit Weight: 117.9 kg (260 lb) (07/18/24 1408), Weight Method: Stated    Estimated Needs    Weight Used For Calorie Calculations: 108.9 kg (240 lb)  Energy Calorie Requirements (kcal): 3978-3828, 1.2-1.4 stress factor  Energy Need Method: [facility]  Weight Used For Protein Calculations: 108.9 kg (240 lb)  Protein Requirements: 142-164 g, 1.3-1.5 g/kg  Fluid Requirements (mL): 5307-2892, 1 ml/kcal or per physician  CHO Requirement: 224-393 g, 40-60% of kcal     Enteral Nutrition     Formula: Impact Peptide 1.5 [place]  Rate/Volume: 80 ml/hr  Water Flushes: 60 ml/hr  Additives/Modulars: Patel BID  Route: gastrostomy tube  Method: continuous  Total Nutrition Provided by Tube Feeding, Additives, and Flushes:  Calories Provided  2580 " kcal/d, 100% needs   Protein Provided  155 g/d, 100% needs   Fluid Provided  2432 ml/d, 100% needs   Continuous feeding calculations based on estimated 20 hr/d run time unless otherwise stated.    Parenteral Nutrition Patient not receiving parenteral nutrition at this time.    Evaluation of Received Nutrient Intake    Calories: meeting estimated needs  Protein: meeting estimated needs    Patient Education Not applicable.    Nutrition Diagnosis     PES: Inadequate energy intake related to inability to consume sufficient nutrients as evidenced by less than 80% needs met. (resolved)    Nutrition Interventions     Intervention(s): modified composition of enteral nutrition, modified rate of enteral nutrition, and collaboration with other providers  Goal: Meet greater than 80% of nutritional needs by follow-up. (goal met)  Goal: Tolerate enteral feeding at goal rate by follow-up. (goal met)    Nutrition Goals & Monitoring     Dietitian will monitor: energy intake, enteral nutrition intake, weight, beliefs/attitudes, glucose/endocrine profile, and gastrointestinal profile  Discharge planning: tube feeding (as recommended/ordered)  Nutrition Risk/Follow-Up: high (follow-up in 1-4 days)   Please consult if re-assessment needed sooner.

## 2024-07-26 NOTE — NURSING
[facility] picked up pt to go to [facility] at 1710.

## 2024-07-26 NOTE — PLAN OF CARE
[facility] Pharmacies has been changes.

## 2024-07-26 NOTE — PLAN OF CARE
Problem: Adult Inpatient Plan of Care  Goal: Plan of Care Review  7/26/2024 1105 by Emeka Daniel RN  Outcome: Progressing  7/26/2024 1105 by Emeka Daniel RN  Outcome: Progressing  Goal: Patient-Specific Goal (Individualized)  7/26/2024 1105 by Emeka Daniel RN  Outcome: Progressing  7/26/2024 1105 by Emeka Daniel RN  Outcome: Progressing  Goal: Absence of Hospital-Acquired Illness or Injury  7/26/2024 1105 by Emeka Daniel RN  Outcome: Progressing  7/26/2024 1105 by Emeka Daniel RN  Outcome: Progressing  Goal: Optimal Comfort and Wellbeing  7/26/2024 1105 by Emeka Daniel RN  Outcome: Progressing  7/26/2024 1105 by Emeka Daniel RN  Outcome: Progressing  Goal: Readiness for Transition of Care  7/26/2024 1105 by Emeka Daniel RN  Outcome: Progressing  7/26/2024 1105 by Emeka Daniel RN  Outcome: Progressing     Problem: Skin Injury Risk Increased  Goal: Skin Health and Integrity  7/26/2024 1105 by Emeka Daniel RN  Outcome: Progressing  7/26/2024 1105 by Emeka Daniel RN  Outcome: Progressing     Problem: Sepsis/Septic Shock  Goal: Absence of Infection Signs and Symptoms  7/26/2024 1105 by Emeka Daniel RN  Outcome: Progressing  7/26/2024 1105 by Emeka Daniel RN  Outcome: Progressing  Goal: Optimal Nutrition Intake  7/26/2024 1105 by Emeka Daniel RN  Outcome: Progressing  7/26/2024 1105 by Emeka Daniel RN  Outcome: Progressing

## 2024-07-27 ENCOUNTER — LAB REQUISITION (OUTPATIENT)
Dept: LAB | Facility: HOSPITAL | Age: 69
End: 2024-07-27
Payer: MEDICARE

## 2024-07-27 DIAGNOSIS — Z29.9 ENCOUNTER FOR PROPHYLACTIC MEASURES, UNSPECIFIED: ICD-10-CM

## 2024-07-27 LAB
ALBUMIN SERPL-MCNC: 3.1 G/DL (ref 3.4–4.8)
ALBUMIN/GLOB SERPL: 0.9 RATIO (ref 1.1–2)
ALP SERPL-CCNC: 138 UNIT/L (ref 40–150)
ALT SERPL-CCNC: 41 UNIT/L (ref 0–55)
ANION GAP SERPL CALC-SCNC: 7 MEQ/L
AST SERPL-CCNC: 32 UNIT/L (ref 5–34)
BASOPHILS # BLD AUTO: 0.05 X10(3)/MCL
BASOPHILS NFR BLD AUTO: 1.1 %
BILIRUB SERPL-MCNC: 0.3 MG/DL
BUN SERPL-MCNC: 15.6 MG/DL (ref 8.4–25.7)
CALCIUM SERPL-MCNC: 8.9 MG/DL (ref 8.8–10)
CHLORIDE SERPL-SCNC: 108 MMOL/L (ref 98–107)
CHOLEST SERPL-MCNC: 83 MG/DL
CHOLEST/HDLC SERPL: 4 {RATIO} (ref 0–5)
CO2 SERPL-SCNC: 25 MMOL/L (ref 23–31)
CREAT SERPL-MCNC: 0.63 MG/DL (ref 0.73–1.18)
CREAT/UREA NIT SERPL: 25
EOSINOPHIL # BLD AUTO: 0.46 X10(3)/MCL (ref 0–0.9)
EOSINOPHIL NFR BLD AUTO: 10 %
ERYTHROCYTE [DISTWIDTH] IN BLOOD BY AUTOMATED COUNT: 15.8 % (ref 11.5–17)
GFR SERPLBLD CREATININE-BSD FMLA CKD-EPI: >60 ML/MIN/1.73/M2
GLOBULIN SER-MCNC: 3.5 GM/DL (ref 2.4–3.5)
GLUCOSE SERPL-MCNC: 99 MG/DL (ref 82–115)
HCT VFR BLD AUTO: 34.1 % (ref 42–52)
HDLC SERPL-MCNC: 23 MG/DL (ref 35–60)
HGB BLD-MCNC: 10.8 G/DL (ref 14–18)
IMM GRANULOCYTES # BLD AUTO: 0.02 X10(3)/MCL (ref 0–0.04)
IMM GRANULOCYTES NFR BLD AUTO: 0.4 %
LDLC SERPL CALC-MCNC: 38 MG/DL (ref 50–140)
LYMPHOCYTES # BLD AUTO: 1.74 X10(3)/MCL (ref 0.6–4.6)
LYMPHOCYTES NFR BLD AUTO: 38 %
MCH RBC QN AUTO: 26.2 PG (ref 27–31)
MCHC RBC AUTO-ENTMCNC: 31.7 G/DL (ref 33–36)
MCV RBC AUTO: 82.8 FL (ref 80–94)
MONOCYTES # BLD AUTO: 0.64 X10(3)/MCL (ref 0.1–1.3)
MONOCYTES NFR BLD AUTO: 14 %
NEUTROPHILS # BLD AUTO: 1.67 X10(3)/MCL (ref 2.1–9.2)
NEUTROPHILS NFR BLD AUTO: 36.5 %
NRBC BLD AUTO-RTO: 0 %
PLATELET # BLD AUTO: 321 X10(3)/MCL (ref 130–400)
PMV BLD AUTO: 10.4 FL (ref 7.4–10.4)
POTASSIUM SERPL-SCNC: 3.8 MMOL/L (ref 3.5–5.1)
PREALB SERPL-MCNC: 21.1 MG/DL (ref 16–42)
PROT SERPL-MCNC: 6.6 GM/DL (ref 5.8–7.6)
PSA SERPL-MCNC: 2.7 NG/ML
RBC # BLD AUTO: 4.12 X10(6)/MCL (ref 4.7–6.1)
SODIUM SERPL-SCNC: 140 MMOL/L (ref 136–145)
TRIGL SERPL-MCNC: 111 MG/DL (ref 34–140)
TSH SERPL-ACNC: 3.43 UIU/ML (ref 0.35–4.94)
VLDLC SERPL CALC-MCNC: 22 MG/DL
WBC # BLD AUTO: 4.58 X10(3)/MCL (ref 4.5–11.5)

## 2024-07-27 PROCEDURE — 80061 LIPID PANEL: CPT | Performed by: FAMILY MEDICINE

## 2024-07-27 PROCEDURE — 84134 ASSAY OF PREALBUMIN: CPT | Performed by: FAMILY MEDICINE

## 2024-07-27 PROCEDURE — 80177 DRUG SCRN QUAN LEVETIRACETAM: CPT | Performed by: FAMILY MEDICINE

## 2024-07-27 PROCEDURE — 80053 COMPREHEN METABOLIC PANEL: CPT | Performed by: FAMILY MEDICINE

## 2024-07-27 PROCEDURE — 85025 COMPLETE CBC W/AUTO DIFF WBC: CPT | Performed by: FAMILY MEDICINE

## 2024-07-27 PROCEDURE — 84153 ASSAY OF PSA TOTAL: CPT | Performed by: FAMILY MEDICINE

## 2024-07-27 PROCEDURE — 84443 ASSAY THYROID STIM HORMONE: CPT | Performed by: FAMILY MEDICINE

## 2024-07-30 LAB — LEVETIRACETAM SERPL-MCNC: 93.3 MCG/ML (ref 10–40)

## 2024-08-03 ENCOUNTER — HOSPITAL ENCOUNTER (INPATIENT)
Facility: HOSPITAL | Age: 69
LOS: 6 days | Discharge: HOME | DRG: 871 | End: 2024-08-10
Attending: STUDENT IN AN ORGANIZED HEALTH CARE EDUCATION/TRAINING PROGRAM | Admitting: INTERNAL MEDICINE
Payer: MEDICARE

## 2024-08-03 DIAGNOSIS — T17.908A ASPIRATION INTO AIRWAY: ICD-10-CM

## 2024-08-03 DIAGNOSIS — R11.2 NAUSEA AND VOMITING, UNSPECIFIED VOMITING TYPE: Primary | ICD-10-CM

## 2024-08-03 DIAGNOSIS — R11.10 VOMITING: ICD-10-CM

## 2024-08-03 DIAGNOSIS — R07.9 CHEST PAIN: ICD-10-CM

## 2024-08-03 DIAGNOSIS — K20.90 ESOPHAGITIS: ICD-10-CM

## 2024-08-03 DIAGNOSIS — R09.02 HYPOXIA: ICD-10-CM

## 2024-08-03 DIAGNOSIS — I48.91 ATRIAL FIBRILLATION WITH RVR: ICD-10-CM

## 2024-08-03 LAB
ALBUMIN SERPL-MCNC: 3.4 G/DL (ref 3.4–4.8)
ALBUMIN/GLOB SERPL: 0.8 RATIO (ref 1.1–2)
ALP SERPL-CCNC: 175 UNIT/L (ref 40–150)
ALT SERPL-CCNC: 23 UNIT/L (ref 0–55)
ANION GAP SERPL CALC-SCNC: 14 MEQ/L
AST SERPL-CCNC: 23 UNIT/L (ref 5–34)
BASOPHILS # BLD AUTO: 0.04 X10(3)/MCL
BASOPHILS NFR BLD AUTO: 0.4 %
BILIRUB SERPL-MCNC: 0.6 MG/DL
BUN SERPL-MCNC: 11.7 MG/DL (ref 8.4–25.7)
CALCIUM SERPL-MCNC: 8.9 MG/DL (ref 8.8–10)
CHLORIDE SERPL-SCNC: 107 MMOL/L (ref 98–107)
CO2 SERPL-SCNC: 23 MMOL/L (ref 23–31)
CREAT SERPL-MCNC: 0.72 MG/DL (ref 0.73–1.18)
CREAT/UREA NIT SERPL: 16
EOSINOPHIL # BLD AUTO: 0.04 X10(3)/MCL (ref 0–0.9)
EOSINOPHIL NFR BLD AUTO: 0.4 %
ERYTHROCYTE [DISTWIDTH] IN BLOOD BY AUTOMATED COUNT: 15.7 % (ref 11.5–17)
FLUAV AG UPPER RESP QL IA.RAPID: NOT DETECTED
FLUBV AG UPPER RESP QL IA.RAPID: NOT DETECTED
GFR SERPLBLD CREATININE-BSD FMLA CKD-EPI: >60 ML/MIN/1.73/M2
GLOBULIN SER-MCNC: 4.2 GM/DL (ref 2.4–3.5)
GLUCOSE SERPL-MCNC: 111 MG/DL (ref 82–115)
HCT VFR BLD AUTO: 42 % (ref 42–52)
HGB BLD-MCNC: 13.4 G/DL (ref 14–18)
IMM GRANULOCYTES # BLD AUTO: 0.03 X10(3)/MCL (ref 0–0.04)
IMM GRANULOCYTES NFR BLD AUTO: 0.3 %
LACTATE SERPL-SCNC: 2.4 MMOL/L (ref 0.5–2.2)
LIPASE SERPL-CCNC: 27 U/L
LYMPHOCYTES # BLD AUTO: 2.32 X10(3)/MCL (ref 0.6–4.6)
LYMPHOCYTES NFR BLD AUTO: 20.8 %
MAGNESIUM SERPL-MCNC: 2.1 MG/DL (ref 1.6–2.6)
MCH RBC QN AUTO: 25.9 PG (ref 27–31)
MCHC RBC AUTO-ENTMCNC: 31.9 G/DL (ref 33–36)
MCV RBC AUTO: 81.2 FL (ref 80–94)
MONOCYTES # BLD AUTO: 0.76 X10(3)/MCL (ref 0.1–1.3)
MONOCYTES NFR BLD AUTO: 6.8 %
NEUTROPHILS # BLD AUTO: 7.96 X10(3)/MCL (ref 2.1–9.2)
NEUTROPHILS NFR BLD AUTO: 71.3 %
NRBC BLD AUTO-RTO: 0 %
PLATELET # BLD AUTO: 282 X10(3)/MCL (ref 130–400)
PLATELETS.RETICULATED NFR BLD AUTO: 1.8 % (ref 0.9–11.2)
PMV BLD AUTO: 9.8 FL (ref 7.4–10.4)
POTASSIUM SERPL-SCNC: 3.4 MMOL/L (ref 3.5–5.1)
PROT SERPL-MCNC: 7.6 GM/DL (ref 5.8–7.6)
RBC # BLD AUTO: 5.17 X10(6)/MCL (ref 4.7–6.1)
RSV A 5' UTR RNA NPH QL NAA+PROBE: NOT DETECTED
SARS-COV-2 RNA RESP QL NAA+PROBE: NOT DETECTED
SODIUM SERPL-SCNC: 144 MMOL/L (ref 136–145)
TROPONIN I SERPL-MCNC: 0.02 NG/ML (ref 0–0.04)
TSH SERPL-ACNC: 2.84 UIU/ML (ref 0.35–4.94)
WBC # BLD AUTO: 11.15 X10(3)/MCL (ref 4.5–11.5)

## 2024-08-03 PROCEDURE — 83605 ASSAY OF LACTIC ACID: CPT | Performed by: STUDENT IN AN ORGANIZED HEALTH CARE EDUCATION/TRAINING PROGRAM

## 2024-08-03 PROCEDURE — 83735 ASSAY OF MAGNESIUM: CPT | Performed by: STUDENT IN AN ORGANIZED HEALTH CARE EDUCATION/TRAINING PROGRAM

## 2024-08-03 PROCEDURE — 93005 ELECTROCARDIOGRAM TRACING: CPT

## 2024-08-03 PROCEDURE — 80053 COMPREHEN METABOLIC PANEL: CPT | Performed by: STUDENT IN AN ORGANIZED HEALTH CARE EDUCATION/TRAINING PROGRAM

## 2024-08-03 PROCEDURE — 96375 TX/PRO/DX INJ NEW DRUG ADDON: CPT

## 2024-08-03 PROCEDURE — 99291 CRITICAL CARE FIRST HOUR: CPT | Mod: 25

## 2024-08-03 PROCEDURE — 83690 ASSAY OF LIPASE: CPT | Performed by: STUDENT IN AN ORGANIZED HEALTH CARE EDUCATION/TRAINING PROGRAM

## 2024-08-03 PROCEDURE — 96365 THER/PROPH/DIAG IV INF INIT: CPT

## 2024-08-03 PROCEDURE — 85025 COMPLETE CBC W/AUTO DIFF WBC: CPT | Performed by: STUDENT IN AN ORGANIZED HEALTH CARE EDUCATION/TRAINING PROGRAM

## 2024-08-03 PROCEDURE — 96376 TX/PRO/DX INJ SAME DRUG ADON: CPT

## 2024-08-03 PROCEDURE — 25000003 PHARM REV CODE 250: Performed by: STUDENT IN AN ORGANIZED HEALTH CARE EDUCATION/TRAINING PROGRAM

## 2024-08-03 PROCEDURE — 87040 BLOOD CULTURE FOR BACTERIA: CPT | Performed by: STUDENT IN AN ORGANIZED HEALTH CARE EDUCATION/TRAINING PROGRAM

## 2024-08-03 PROCEDURE — 96366 THER/PROPH/DIAG IV INF ADDON: CPT

## 2024-08-03 PROCEDURE — 84443 ASSAY THYROID STIM HORMONE: CPT | Performed by: STUDENT IN AN ORGANIZED HEALTH CARE EDUCATION/TRAINING PROGRAM

## 2024-08-03 PROCEDURE — 96368 THER/DIAG CONCURRENT INF: CPT

## 2024-08-03 PROCEDURE — 63600175 PHARM REV CODE 636 W HCPCS: Performed by: STUDENT IN AN ORGANIZED HEALTH CARE EDUCATION/TRAINING PROGRAM

## 2024-08-03 PROCEDURE — 84484 ASSAY OF TROPONIN QUANT: CPT | Performed by: STUDENT IN AN ORGANIZED HEALTH CARE EDUCATION/TRAINING PROGRAM

## 2024-08-03 PROCEDURE — 93010 ELECTROCARDIOGRAM REPORT: CPT | Mod: ,,, | Performed by: INTERNAL MEDICINE

## 2024-08-03 PROCEDURE — 0241U COVID/RSV/FLU A&B PCR: CPT | Performed by: STUDENT IN AN ORGANIZED HEALTH CARE EDUCATION/TRAINING PROGRAM

## 2024-08-03 RX ORDER — DILTIAZEM HYDROCHLORIDE 5 MG/ML
20 INJECTION INTRAVENOUS
Status: COMPLETED | OUTPATIENT
Start: 2024-08-03 | End: 2024-08-03

## 2024-08-03 RX ORDER — ONDANSETRON HYDROCHLORIDE 2 MG/ML
4 INJECTION, SOLUTION INTRAVENOUS
Status: COMPLETED | OUTPATIENT
Start: 2024-08-03 | End: 2024-08-03

## 2024-08-03 RX ORDER — METOCLOPRAMIDE HYDROCHLORIDE 5 MG/ML
10 INJECTION INTRAMUSCULAR; INTRAVENOUS
Status: COMPLETED | OUTPATIENT
Start: 2024-08-03 | End: 2024-08-03

## 2024-08-03 RX ADMIN — DILTIAZEM HYDROCHLORIDE 5 MG/HR: 5 INJECTION, SOLUTION INTRAVENOUS at 09:08

## 2024-08-03 RX ADMIN — DILTIAZEM HYDROCHLORIDE 20 MG: 5 INJECTION INTRAVENOUS at 09:08

## 2024-08-03 RX ADMIN — METOCLOPRAMIDE 10 MG: 5 INJECTION, SOLUTION INTRAMUSCULAR; INTRAVENOUS at 10:08

## 2024-08-03 RX ADMIN — ONDANSETRON 4 MG: 2 INJECTION INTRAMUSCULAR; INTRAVENOUS at 08:08

## 2024-08-03 NOTE — Clinical Note
Diagnosis: Aspiration into airway [[id number]]   Future Attending Provider: BETY HKOURY [[id number]]   Admit to which facility:: [facility] [[id number]]   Reason for IP Medical Treatment  (Clinical interventions that can only be accomplished in the IP setting? ) :: abx, IVF   I certify that Inpatient services for greater than or equal to 2 midnights are medically necessary:: Yes   Plans for Post-Acute care--if anticipated (pick the single best option):: A. No post acute care anticipated at this time   Special Needs:: No Special Needs [1]

## 2024-08-04 LAB
ALBUMIN SERPL-MCNC: 3.4 G/DL (ref 3.4–4.8)
ALBUMIN/GLOB SERPL: 1 RATIO (ref 1.1–2)
ALP SERPL-CCNC: 168 UNIT/L (ref 40–150)
ALT SERPL-CCNC: 21 UNIT/L (ref 0–55)
ANION GAP SERPL CALC-SCNC: 12 MEQ/L
AST SERPL-CCNC: 22 UNIT/L (ref 5–34)
BACTERIA #/AREA URNS AUTO: ABNORMAL /HPF
BASE EXCESS BLD CALC-SCNC: 8.2 MMOL/L (ref -2–2)
BASOPHILS # BLD AUTO: 0.07 X10(3)/MCL
BASOPHILS NFR BLD AUTO: 0.6 %
BILIRUB SERPL-MCNC: 1 MG/DL
BILIRUB UR QL STRIP.AUTO: NEGATIVE
BLOOD GAS SAMPLE TYPE (OHS): ABNORMAL
BUN SERPL-MCNC: 11.2 MG/DL (ref 8.4–25.7)
CA-I BLD-SCNC: 1.1 MMOL/L (ref 1.12–1.23)
CALCIUM SERPL-MCNC: 8.4 MG/DL (ref 8.8–10)
CAOX CRY UR QL COMP ASSIST: ABNORMAL
CHLORIDE SERPL-SCNC: 109 MMOL/L (ref 98–107)
CLARITY UR: CLEAR
CO2 BLDA-SCNC: 29 MMOL/L
CO2 SERPL-SCNC: 22 MMOL/L (ref 23–31)
COHGB MFR BLDA: 2 % (ref 0.5–1.5)
COLOR UR AUTO: YELLOW
CREAT SERPL-MCNC: 0.76 MG/DL (ref 0.73–1.18)
CREAT/UREA NIT SERPL: 15
DRAWN BY BLOOD GAS (OHS): ABNORMAL
EOSINOPHIL # BLD AUTO: 0.05 X10(3)/MCL (ref 0–0.9)
EOSINOPHIL NFR BLD AUTO: 0.5 %
ERYTHROCYTE [DISTWIDTH] IN BLOOD BY AUTOMATED COUNT: 15.8 % (ref 11.5–17)
GFR SERPLBLD CREATININE-BSD FMLA CKD-EPI: >60 ML/MIN/1.73/M2
GLOBULIN SER-MCNC: 3.4 GM/DL (ref 2.4–3.5)
GLUCOSE SERPL-MCNC: 121 MG/DL (ref 82–115)
GLUCOSE UR QL STRIP: NORMAL
HCO3 BLDA-SCNC: 28.2 MMOL/L (ref 22–26)
HCT VFR BLD AUTO: 36.1 % (ref 42–52)
HGB BLD-MCNC: 11.8 G/DL (ref 14–18)
HGB UR QL STRIP: ABNORMAL
IMM GRANULOCYTES # BLD AUTO: 0.03 X10(3)/MCL (ref 0–0.04)
IMM GRANULOCYTES NFR BLD AUTO: 0.3 %
KETONES UR QL STRIP: NEGATIVE
LACTATE SERPL-SCNC: 2.7 MMOL/L (ref 0.5–2.2)
LEUKOCYTE ESTERASE UR QL STRIP: 25
LPM (OHS): 4
LYMPHOCYTES # BLD AUTO: 1.83 X10(3)/MCL (ref 0.6–4.6)
LYMPHOCYTES NFR BLD AUTO: 16.5 %
MAGNESIUM SERPL-MCNC: 2 MG/DL (ref 1.6–2.6)
MCH RBC QN AUTO: 26.2 PG (ref 27–31)
MCHC RBC AUTO-ENTMCNC: 32.7 G/DL (ref 33–36)
MCV RBC AUTO: 80.2 FL (ref 80–94)
METHGB MFR BLDA: 1.6 % (ref 0.4–1.5)
MONOCYTES # BLD AUTO: 0.84 X10(3)/MCL (ref 0.1–1.3)
MONOCYTES NFR BLD AUTO: 7.6 %
MUCOUS THREADS URNS QL MICRO: ABNORMAL /LPF
NEUTROPHILS # BLD AUTO: 8.24 X10(3)/MCL (ref 2.1–9.2)
NEUTROPHILS NFR BLD AUTO: 74.5 %
NITRITE UR QL STRIP: NEGATIVE
NRBC BLD AUTO-RTO: 0 %
O2 HB BLOOD GAS (OHS): 96 % (ref 94–97)
OXYGEN DEVICE BLOOD GAS (OHS): ABNORMAL
OXYHGB MFR BLDA: 12.1 G/DL (ref 12–16)
PCO2 BLDA: 25 MMHG (ref 35–45)
PH BLDA: 7.66 [PH] (ref 7.35–7.45)
PH UR STRIP: 8 [PH]
PHOSPHATE SERPL-MCNC: 3.4 MG/DL (ref 2.3–4.7)
PLATELET # BLD AUTO: 307 X10(3)/MCL (ref 130–400)
PLATELETS.RETICULATED NFR BLD AUTO: 1.7 % (ref 0.9–11.2)
PMV BLD AUTO: 9.8 FL (ref 7.4–10.4)
PO2 BLDA: 100 MMHG (ref 80–100)
POTASSIUM BLOOD GAS (OHS): 2.9 MMOL/L (ref 3.5–5)
POTASSIUM SERPL-SCNC: 3.5 MMOL/L (ref 3.5–5.1)
PROT SERPL-MCNC: 6.8 GM/DL (ref 5.8–7.6)
PROT UR QL STRIP: ABNORMAL
RBC # BLD AUTO: 4.5 X10(6)/MCL (ref 4.7–6.1)
RBC #/AREA URNS AUTO: >100 /HPF
SAMPLE SITE BLOOD GAS (OHS): ABNORMAL
SAO2 % BLDA: 98.9 %
SODIUM BLOOD GAS (OHS): 138 MMOL/L (ref 137–145)
SODIUM SERPL-SCNC: 143 MMOL/L (ref 136–145)
SP GR UR STRIP.AUTO: 1.05 (ref 1–1.03)
SQUAMOUS #/AREA URNS LPF: ABNORMAL /HPF
TROPONIN I SERPL-MCNC: <0.01 NG/ML (ref 0–0.04)
UROBILINOGEN UR STRIP-ACNC: NORMAL
WBC # BLD AUTO: 11.06 X10(3)/MCL (ref 4.5–11.5)
WBC #/AREA URNS AUTO: ABNORMAL /HPF

## 2024-08-04 PROCEDURE — 25000003 PHARM REV CODE 250

## 2024-08-04 PROCEDURE — 27200966 HC CLOSED SUCTION SYSTEM

## 2024-08-04 PROCEDURE — 25000003 PHARM REV CODE 250: Performed by: INTERNAL MEDICINE

## 2024-08-04 PROCEDURE — 80053 COMPREHEN METABOLIC PANEL: CPT | Performed by: NURSE PRACTITIONER

## 2024-08-04 PROCEDURE — 63600175 PHARM REV CODE 636 W HCPCS: Performed by: INTERNAL MEDICINE

## 2024-08-04 PROCEDURE — 25500020 PHARM REV CODE 255: Performed by: STUDENT IN AN ORGANIZED HEALTH CARE EDUCATION/TRAINING PROGRAM

## 2024-08-04 PROCEDURE — 83735 ASSAY OF MAGNESIUM: CPT | Performed by: NURSE PRACTITIONER

## 2024-08-04 PROCEDURE — 63600175 PHARM REV CODE 636 W HCPCS: Performed by: NURSE PRACTITIONER

## 2024-08-04 PROCEDURE — 87077 CULTURE AEROBIC IDENTIFY: CPT | Performed by: STUDENT IN AN ORGANIZED HEALTH CARE EDUCATION/TRAINING PROGRAM

## 2024-08-04 PROCEDURE — 25000242 PHARM REV CODE 250 ALT 637 W/ HCPCS: Performed by: INTERNAL MEDICINE

## 2024-08-04 PROCEDURE — 84484 ASSAY OF TROPONIN QUANT: CPT | Performed by: NURSE PRACTITIONER

## 2024-08-04 PROCEDURE — 83605 ASSAY OF LACTIC ACID: CPT | Performed by: STUDENT IN AN ORGANIZED HEALTH CARE EDUCATION/TRAINING PROGRAM

## 2024-08-04 PROCEDURE — 25000003 PHARM REV CODE 250: Performed by: STUDENT IN AN ORGANIZED HEALTH CARE EDUCATION/TRAINING PROGRAM

## 2024-08-04 PROCEDURE — 27000221 HC OXYGEN, UP TO 24 HOURS

## 2024-08-04 PROCEDURE — 87070 CULTURE OTHR SPECIMN AEROBIC: CPT | Performed by: INTERNAL MEDICINE

## 2024-08-04 PROCEDURE — 94640 AIRWAY INHALATION TREATMENT: CPT

## 2024-08-04 PROCEDURE — 85025 COMPLETE CBC W/AUTO DIFF WBC: CPT | Performed by: NURSE PRACTITIONER

## 2024-08-04 PROCEDURE — 99900026 HC AIRWAY MAINTENANCE (STAT)

## 2024-08-04 PROCEDURE — 84100 ASSAY OF PHOSPHORUS: CPT | Performed by: NURSE PRACTITIONER

## 2024-08-04 PROCEDURE — 94760 N-INVAS EAR/PLS OXIMETRY 1: CPT | Mod: XB

## 2024-08-04 PROCEDURE — 36600 WITHDRAWAL OF ARTERIAL BLOOD: CPT

## 2024-08-04 PROCEDURE — 94761 N-INVAS EAR/PLS OXIMETRY MLT: CPT | Mod: XB

## 2024-08-04 PROCEDURE — 81001 URINALYSIS AUTO W/SCOPE: CPT | Performed by: STUDENT IN AN ORGANIZED HEALTH CARE EDUCATION/TRAINING PROGRAM

## 2024-08-04 PROCEDURE — 96366 THER/PROPH/DIAG IV INF ADDON: CPT

## 2024-08-04 PROCEDURE — 21400001 HC TELEMETRY ROOM

## 2024-08-04 PROCEDURE — 99900035 HC TECH TIME PER 15 MIN (STAT)

## 2024-08-04 PROCEDURE — 99900031 HC PATIENT EDUCATION (STAT)

## 2024-08-04 PROCEDURE — 63600175 PHARM REV CODE 636 W HCPCS: Performed by: STUDENT IN AN ORGANIZED HEALTH CARE EDUCATION/TRAINING PROGRAM

## 2024-08-04 PROCEDURE — 96375 TX/PRO/DX INJ NEW DRUG ADDON: CPT

## 2024-08-04 PROCEDURE — 82803 BLOOD GASES ANY COMBINATION: CPT

## 2024-08-04 PROCEDURE — 87086 URINE CULTURE/COLONY COUNT: CPT | Performed by: STUDENT IN AN ORGANIZED HEALTH CARE EDUCATION/TRAINING PROGRAM

## 2024-08-04 PROCEDURE — 11000001 HC ACUTE MED/SURG PRIVATE ROOM

## 2024-08-04 RX ORDER — ACETAMINOPHEN 650 MG/20.3ML
1000 LIQUID ORAL EVERY 8 HOURS PRN
Status: DISCONTINUED | OUTPATIENT
Start: 2024-08-04 | End: 2024-08-10 | Stop reason: HOSPADM

## 2024-08-04 RX ORDER — LEVETIRACETAM 100 MG/ML
1500 SOLUTION ORAL 2 TIMES DAILY
Status: DISCONTINUED | OUTPATIENT
Start: 2024-08-04 | End: 2024-08-10 | Stop reason: HOSPADM

## 2024-08-04 RX ORDER — DILTIAZEM HYDROCHLORIDE 60 MG/1
60 TABLET, FILM COATED ORAL EVERY 6 HOURS
Status: DISCONTINUED | OUTPATIENT
Start: 2024-08-04 | End: 2024-08-10 | Stop reason: HOSPADM

## 2024-08-04 RX ORDER — PANTOPRAZOLE SODIUM 40 MG/10ML
40 INJECTION, POWDER, LYOPHILIZED, FOR SOLUTION INTRAVENOUS
Status: COMPLETED | OUTPATIENT
Start: 2024-08-04 | End: 2024-08-04

## 2024-08-04 RX ORDER — IPRATROPIUM BROMIDE AND ALBUTEROL SULFATE 2.5; .5 MG/3ML; MG/3ML
3 SOLUTION RESPIRATORY (INHALATION)
Status: DISCONTINUED | OUTPATIENT
Start: 2024-08-04 | End: 2024-08-10 | Stop reason: HOSPADM

## 2024-08-04 RX ORDER — METOPROLOL TARTRATE 50 MG/1
50 TABLET ORAL 2 TIMES DAILY
Status: DISCONTINUED | OUTPATIENT
Start: 2024-08-04 | End: 2024-08-04

## 2024-08-04 RX ORDER — SCOLOPAMINE TRANSDERMAL SYSTEM 1 MG/1
1 PATCH, EXTENDED RELEASE TRANSDERMAL
Status: DISCONTINUED | OUTPATIENT
Start: 2024-08-04 | End: 2024-08-10 | Stop reason: HOSPADM

## 2024-08-04 RX ORDER — MUPIROCIN 20 MG/G
OINTMENT TOPICAL 2 TIMES DAILY
Status: COMPLETED | OUTPATIENT
Start: 2024-08-04 | End: 2024-08-08

## 2024-08-04 RX ORDER — ATORVASTATIN CALCIUM 10 MG/1
10 TABLET, FILM COATED ORAL DAILY
Status: DISCONTINUED | OUTPATIENT
Start: 2024-08-04 | End: 2024-08-10 | Stop reason: HOSPADM

## 2024-08-04 RX ORDER — SIMETHICONE 80 MG
1 TABLET,CHEWABLE ORAL 4 TIMES DAILY PRN
Status: DISCONTINUED | OUTPATIENT
Start: 2024-08-04 | End: 2024-08-10 | Stop reason: HOSPADM

## 2024-08-04 RX ORDER — ACETAMINOPHEN 325 MG/1
650 TABLET ORAL EVERY 6 HOURS PRN
Status: DISCONTINUED | OUTPATIENT
Start: 2024-08-04 | End: 2024-08-04

## 2024-08-04 RX ORDER — TAMSULOSIN HYDROCHLORIDE 0.4 MG/1
0.4 CAPSULE ORAL NIGHTLY
Status: DISCONTINUED | OUTPATIENT
Start: 2024-08-04 | End: 2024-08-10 | Stop reason: HOSPADM

## 2024-08-04 RX ORDER — QUETIAPINE FUMARATE 25 MG/1
25 TABLET, FILM COATED ORAL 2 TIMES DAILY
Status: DISCONTINUED | OUTPATIENT
Start: 2024-08-04 | End: 2024-08-10 | Stop reason: HOSPADM

## 2024-08-04 RX ORDER — BUSPIRONE HYDROCHLORIDE 5 MG/1
5 TABLET ORAL 3 TIMES DAILY
Status: DISCONTINUED | OUTPATIENT
Start: 2024-08-04 | End: 2024-08-10 | Stop reason: HOSPADM

## 2024-08-04 RX ORDER — ONDANSETRON HYDROCHLORIDE 2 MG/ML
4 INJECTION, SOLUTION INTRAVENOUS EVERY 4 HOURS PRN
Status: DISCONTINUED | OUTPATIENT
Start: 2024-08-04 | End: 2024-08-10 | Stop reason: HOSPADM

## 2024-08-04 RX ORDER — LORAZEPAM 1 MG/1
1 TABLET ORAL 2 TIMES DAILY
Status: DISCONTINUED | OUTPATIENT
Start: 2024-08-04 | End: 2024-08-10 | Stop reason: HOSPADM

## 2024-08-04 RX ORDER — ALUMINUM HYDROXIDE, MAGNESIUM HYDROXIDE, AND SIMETHICONE 1200; 120; 1200 MG/30ML; MG/30ML; MG/30ML
30 SUSPENSION ORAL 4 TIMES DAILY PRN
Status: DISCONTINUED | OUTPATIENT
Start: 2024-08-04 | End: 2024-08-10 | Stop reason: HOSPADM

## 2024-08-04 RX ORDER — FINASTERIDE 5 MG/1
5 TABLET, FILM COATED ORAL DAILY
Status: DISCONTINUED | OUTPATIENT
Start: 2024-08-04 | End: 2024-08-10 | Stop reason: HOSPADM

## 2024-08-04 RX ORDER — NALOXONE HCL 0.4 MG/ML
0.02 VIAL (ML) INJECTION
Status: DISCONTINUED | OUTPATIENT
Start: 2024-08-04 | End: 2024-08-10 | Stop reason: HOSPADM

## 2024-08-04 RX ORDER — SODIUM CHLORIDE, SODIUM LACTATE, POTASSIUM CHLORIDE, CALCIUM CHLORIDE 600; 310; 30; 20 MG/100ML; MG/100ML; MG/100ML; MG/100ML
INJECTION, SOLUTION INTRAVENOUS CONTINUOUS
Status: DISCONTINUED | OUTPATIENT
Start: 2024-08-04 | End: 2024-08-08

## 2024-08-04 RX ORDER — METOPROLOL TARTRATE 50 MG/1
100 TABLET ORAL 2 TIMES DAILY
Status: DISCONTINUED | OUTPATIENT
Start: 2024-08-04 | End: 2024-08-10 | Stop reason: HOSPADM

## 2024-08-04 RX ORDER — PANTOPRAZOLE SODIUM 40 MG/10ML
40 INJECTION, POWDER, LYOPHILIZED, FOR SOLUTION INTRAVENOUS 2 TIMES DAILY
Status: DISCONTINUED | OUTPATIENT
Start: 2024-08-04 | End: 2024-08-10

## 2024-08-04 RX ORDER — POLYETHYLENE GLYCOL 3350 17 G/17G
17 POWDER, FOR SOLUTION ORAL 2 TIMES DAILY PRN
Status: DISCONTINUED | OUTPATIENT
Start: 2024-08-04 | End: 2024-08-10 | Stop reason: HOSPADM

## 2024-08-04 RX ORDER — ACETAMINOPHEN 650 MG/20.3ML
1000 LIQUID ORAL EVERY 8 HOURS PRN
Status: DISCONTINUED | OUTPATIENT
Start: 2024-08-04 | End: 2024-08-04

## 2024-08-04 RX ORDER — TALC
6 POWDER (GRAM) TOPICAL NIGHTLY PRN
Status: DISCONTINUED | OUTPATIENT
Start: 2024-08-04 | End: 2024-08-10 | Stop reason: HOSPADM

## 2024-08-04 RX ORDER — PROCHLORPERAZINE EDISYLATE 5 MG/ML
5 INJECTION INTRAMUSCULAR; INTRAVENOUS EVERY 6 HOURS PRN
Status: DISCONTINUED | OUTPATIENT
Start: 2024-08-04 | End: 2024-08-10 | Stop reason: HOSPADM

## 2024-08-04 RX ADMIN — MUPIROCIN: 20 OINTMENT TOPICAL at 09:08

## 2024-08-04 RX ADMIN — METOPROLOL TARTRATE 100 MG: 50 TABLET, FILM COATED ORAL at 10:08

## 2024-08-04 RX ADMIN — QUETIAPINE FUMARATE 25 MG: 25 TABLET ORAL at 10:08

## 2024-08-04 RX ADMIN — RIVAROXABAN 20 MG: 10 TABLET, FILM COATED ORAL at 06:08

## 2024-08-04 RX ADMIN — BUSPIRONE HYDROCHLORIDE 5 MG: 5 TABLET ORAL at 10:08

## 2024-08-04 RX ADMIN — DILTIAZEM HYDROCHLORIDE 15 MG/HR: 5 INJECTION, SOLUTION INTRAVENOUS at 06:08

## 2024-08-04 RX ADMIN — ACETAMINOPHEN 999.01 MG: 650 SOLUTION ORAL at 06:08

## 2024-08-04 RX ADMIN — TAMSULOSIN HYDROCHLORIDE 0.4 MG: 0.4 CAPSULE ORAL at 10:08

## 2024-08-04 RX ADMIN — PANTOPRAZOLE SODIUM 40 MG: 40 INJECTION, POWDER, LYOPHILIZED, FOR SOLUTION INTRAVENOUS at 09:08

## 2024-08-04 RX ADMIN — ATORVASTATIN CALCIUM 10 MG: 10 TABLET, FILM COATED ORAL at 01:08

## 2024-08-04 RX ADMIN — PIPERACILLIN AND TAZOBACTAM 4.5 G: 4; .5 INJECTION, POWDER, LYOPHILIZED, FOR SOLUTION INTRAVENOUS; PARENTERAL at 08:08

## 2024-08-04 RX ADMIN — IOHEXOL 100 ML: 350 INJECTION, SOLUTION INTRAVENOUS at 12:08

## 2024-08-04 RX ADMIN — METHYLPREDNISOLONE SODIUM SUCCINATE 80 MG: 40 INJECTION, POWDER, FOR SOLUTION INTRAMUSCULAR; INTRAVENOUS at 01:08

## 2024-08-04 RX ADMIN — IPRATROPIUM BROMIDE AND ALBUTEROL SULFATE 3 ML: 2.5; .5 SOLUTION RESPIRATORY (INHALATION) at 07:08

## 2024-08-04 RX ADMIN — PIPERACILLIN AND TAZOBACTAM 4.5 G: 4; .5 INJECTION, POWDER, LYOPHILIZED, FOR SOLUTION INTRAVENOUS; PARENTERAL at 06:08

## 2024-08-04 RX ADMIN — PIPERACILLIN AND TAZOBACTAM 4.5 G: 4; .5 INJECTION, POWDER, LYOPHILIZED, FOR SOLUTION INTRAVENOUS; PARENTERAL at 12:08

## 2024-08-04 RX ADMIN — LORAZEPAM 1 MG: 1 TABLET ORAL at 10:08

## 2024-08-04 RX ADMIN — SCOPOLAMINE 1 PATCH: 1 PATCH TRANSDERMAL at 10:08

## 2024-08-04 RX ADMIN — LORAZEPAM 1 MG: 1 TABLET ORAL at 02:08

## 2024-08-04 RX ADMIN — BUSPIRONE HYDROCHLORIDE 5 MG: 5 TABLET ORAL at 02:08

## 2024-08-04 RX ADMIN — LEVETIRACETAM 1500 MG: 100 SOLUTION ORAL at 10:08

## 2024-08-04 RX ADMIN — FINASTERIDE 5 MG: 5 TABLET, FILM COATED ORAL at 01:08

## 2024-08-04 RX ADMIN — SODIUM CHLORIDE, POTASSIUM CHLORIDE, SODIUM LACTATE AND CALCIUM CHLORIDE: 600; 310; 30; 20 INJECTION, SOLUTION INTRAVENOUS at 07:08

## 2024-08-04 RX ADMIN — MUPIROCIN: 20 OINTMENT TOPICAL at 10:08

## 2024-08-04 RX ADMIN — SODIUM CHLORIDE, POTASSIUM CHLORIDE, SODIUM LACTATE AND CALCIUM CHLORIDE 1000 ML: 600; 310; 30; 20 INJECTION, SOLUTION INTRAVENOUS at 12:08

## 2024-08-04 RX ADMIN — DILTIAZEM HYDROCHLORIDE 60 MG: 60 TABLET, FILM COATED ORAL at 06:08

## 2024-08-04 RX ADMIN — IPRATROPIUM BROMIDE AND ALBUTEROL SULFATE 3 ML: 2.5; .5 SOLUTION RESPIRATORY (INHALATION) at 12:08

## 2024-08-04 RX ADMIN — PANTOPRAZOLE SODIUM 40 MG: 40 INJECTION, POWDER, LYOPHILIZED, FOR SOLUTION INTRAVENOUS at 02:08

## 2024-08-04 RX ADMIN — DILTIAZEM HYDROCHLORIDE 60 MG: 60 TABLET, FILM COATED ORAL at 01:08

## 2024-08-04 NOTE — CONSULTS
Inpatient consult to Cardiology  Consult performed by: Annalisa Erickson FNP  Consult ordered by: Tania Butts, AGACNP-BC  Reason for consult: AFIB        [facility]    Cardiology  Consult Note    Patient Name: Delio Daniel Jr.  MRN: [id number]  Admission Date: 8/3/2024  Hospital Length of Stay: 0 days  Code Status: Full Code   Attending Provider: Sekou Contreras MD   Consulting Provider: TRUMAN Jackson  Primary Care Physician: Jl Briones MD  Principal Problem:<principal problem not specified>    Patient information was obtained from patient, past medical records, ER records, and primary team.     Subjective:     Chief Complaint/Reason for Consult: AFIB    HPI: Mr. Daniel is a 68 y/o male with a history of AFIB on Xarelto, CVA, SSS,  HTN, HLD, CAD, who is known to [facility], Dr. Kimbrough. He presented to the ER on 8.3.24 from [facility] for vomiting. Nursing home reports he was vomiting from his Trach. EMS reports vomiting from his mouth. EMS reports that patient was in AFIB RVR. He was given 20mg IV Cardizem and placed on a cardizem gtt. Significant labs include H&H 11.8/36.1, CO2 22, Chloride 109, Glucose 121, Calcium 8.4, , Lactic Acid 2.7. Flu, RSV, and COVID negative.UA positive for bacteria. Urine cultures and blood cultures positive. CT abdomen shows prominent distal esophageal wall thickening suggestive of esophagitis. CXR shows Right infrahilar lower lung zone atelectasis and possible small left pleural effusion.  [facility] has been consulted for AFIB management.     PMH: AFIB on Xarelto, CVA, SSS,  HTN, HLD, CAD, Arthritis, Dysphagia, Seizure, Liver Steatosis, Diverticulosis, BPH, Depression, Obesity   PSH: PEG, AICD, LHC, Trach, Craniectomy, EGD, Colonoscopy,   Family History: Mother - HTN; Sister - HTN; Brother - HTN  Social History: Denies smoking, illicit drug use, and alcohol use.     Previous Cardiac Diagnostics:   ECHO (1.15.24):  TDS. No  Definity contrast available for use. Left Ventricle: The left ventricle is normal in size. Moderately increased wall thickness. Normal wall motion. There is normal systolic function. Ejection fraction by visual approximation is 55%. Right Ventricle: Normal right ventricular cavity size. Systolic function is borderline low. Left Atrium: Left atrium is severely dilated. Right Atrium: Right atrium is mildly dilated. Mitral Valve: There is bileaflet sclerosis. There is mild regurgitation. IVC/SVC: Normal venous pressure at 3 mmHg.    Venous US LUE (12.26.23):  There was no evidence of deep vein thrombosis in the left upper extremity.   A superficial thrombosis was identified in the left cephalic vein.     Carotid US (12.19.23):  The right internal carotid artery demonstrated less than 50% stenosis.  The left internal carotid artery demonstrated less than 50% stenosis.  The bilateral vertebral arteries were patent with antegrade flow.  Bilateral internal carotid arteries demonstrated decreased velocities starting from the common carotid arteries.     LHC (5.25.18):   LM: Normal. Normal size and bifurcation. LAD: Abnormal. Large, mild atherosclerotic plaque, moderately large diagonals. Mid LAD 30% stenosis. The lesion was tubular and eccentric. LCX: abnormal and Large, mild atherosclerotic plaque, large OM with mild narrowing.OM 1 35% stenosis. RCA: normal. Large and no significant disease.        Review of patient's allergies indicates:  No Known Allergies  No current facility-administered medications on file prior to encounter.     Current Outpatient Medications on File Prior to Encounter   Medication Sig    ascorbic Acid (VITAMIN C) 500 mg CpSR 500 mg 2 (two) times daily. Per PEG tube    busPIRone (BUSPAR) 5 MG Tab 5 mg by Per G Tube route 3 (three) times daily.    cholestyramine (QUESTRAN) 4 gram packet 4 g once daily. Via PEG tube    cholestyramine-aspartame (QUESTRAN LIGHT) 4 gram PwPk 1 packet (4 g total) by Per G  Tube route 2 (two) times daily.    diltiaZEM (CARDIZEM) 60 MG tablet 60 mg by Per G Tube route every 6 (six) hours.    famotidine (PEPCID) 20 MG tablet 1 tablet (20 mg total) by Per G Tube route 2 (two) times daily.    finasteride (PROSCAR) 5 mg tablet Take 1 tablet (5 mg total) by mouth once daily.    furosemide (LASIX) 80 MG tablet 80 mg by Per G Tube route as needed (for Edema).    hydrALAZINE (APRESOLINE) 50 MG tablet 75 mg by Per G Tube route every 8 (eight) hours as needed (for elevated BP).    L. acidophilus/L.bulgaricus (FLORANEX ORAL) 1 packet by PEG Tube route Daily.    levetiracetam 500 mg/5 mL (5 mL) Soln 1,500 mg by Per G Tube route 2 (two) times daily. Nursing home reports medication hold by MD until level redraw on Monday.    LIPITOR 10 mg tablet 10 mg by Per G Tube route once daily.    losartan (COZAAR) 100 MG tablet 100 mg by Per G Tube route once daily.    metoprolol tartrate (LOPRESSOR) 100 MG tablet 100 mg by Per G Tube route 2 (two) times daily.    multivitamin (THERAGRAN) per tablet 1 tablet by Per G Tube route once daily.    protein supplement (PROMOD PROTEIN ORAL) 30 mLs by Per G Tube route once daily.    QUEtiapine (SEROQUEL) 25 MG Tab 25 mg by Per G Tube route 2 (two) times daily.    scopolamine (TRANSDERM-SCOP) 1.3-1.5 mg (1 mg over 3 days) Place 1 patch onto the skin Every 3 (three) days.    tamsulosin (FLOMAX) 0.4 mg Cap Take 1 capsule (0.4 mg total) by mouth every evening.    venlafaxine 75 mg TR24 1 tablet by Per G Tube route 2 (two) times a day.    cloNIDine (CATAPRES) 0.1 MG tablet 0.1 mg by Per G Tube route every 8 (eight) hours as needed (Give for systolic >180).    XARELTO 20 mg Tab 1 tablet by Per G Tube route nightly.     Review of Systems   Unable to perform ROS: Acuity of condition       Objective:     Vital Signs (Most Recent):  Temp: 98.1 °F (36.7 °C) (08/04/24 1222)  Pulse: 92 (08/04/24 1222)  Resp: 20 (08/04/24 1222)  BP: 138/80 (08/04/24 1222)  SpO2: 100 % (08/04/24 1222)  "Vital Signs (24h Range):  Temp:  [98.1 °F (36.7 °C)-100.5 °F (38.1 °C)] 98.1 °F (36.7 °C)  Pulse:  [] 92  Resp:  [11-37] 20  SpO2:  [92 %-100 %] 100 %  BP: (103-163)/() 138/80   Weight: 117.9 kg (260 lb)  Body mass index is 37.31 kg/m².  SpO2: 100 %       Intake/Output Summary (Last 24 hours) at 8/4/2024 1403  Last data filed at 8/4/2024 1132  Gross per 24 hour   Intake 190.8 ml   Output --   Net 190.8 ml     Lines/Drains/Airways       Drain  Duration                  Gastrostomy/Enterostomy 01/02/24 1230  days              Airway  Duration             Adult Surgical Airway 12/29/23 1229 Shiley 6.0/ 75mm 219 days              Peripheral Intravenous Line  Duration                  Peripheral IV - Single Lumen 08/03/24 20 G Anterior;Left Hand 1 day                  Significant Labs:   Chemistries:   Recent Labs   Lab 08/03/24 2141 08/04/24  0629    143   K 3.4* 3.5    109*   CO2 23 22*   BUN 11.7 11.2   CREATININE 0.72* 0.76   CALCIUM 8.9 8.4*   BILITOT 0.6 1.0   ALKPHOS 175* 168*   ALT 23 21   AST 23 22   GLUCOSE 111 121*   MG 2.10 2.00   PHOS  --  3.4   TROPONINI 0.016 <0.010        CBC/Anemia Labs: Coags:    Recent Labs   Lab 08/03/24 2141 08/04/24  0629   WBC 11.15 11.06   HGB 13.4* 11.8*   HCT 42.0 36.1*    307   MCV 81.2 80.2   RDW 15.7 15.8    No results for input(s): "PT", "INR", "APTT" in the last 168 hours.     Significant Imaging:  Imaging Results              CT Abdomen Pelvis With IV Contrast NO Oral Contrast (Final result)  Result time 08/04/24 09:25:59      Final result by Tani Calderon MD (08/04/24 09:25:59)                   Impression:      1. Prominent distal esophageal wall thickening suggestive of esophagitis.  2. Otherwise no acute abdominopelvic findings.  Chronic findings above.  3. No significant discrepancy with the preliminary report.      Electronically signed by: Tani Calderon  Date:    08/04/2024  Time:    09:25               Narrative:    " EXAMINATION:  CT ABDOMEN PELVIS WITH IV CONTRAST    CLINICAL HISTORY:  Abdominal pain, acute, nonlocalized;Nausea/vomiting;    TECHNIQUE:  Helical acquisition through the abdomen and pelvis with IV contrast.  Three plane reconstructions were provided for review. DLP 1610 mGycm. Automatic exposure control, adjustment of mA/kV or iterative reconstruction technique was used to reduce radiation.    COMPARISON:  18 July 2024    FINDINGS:  Motion degraded scan.    The limited imaged lung bases are clear.    No acute findings liver, gallbladder, spleen, pancreas or adrenals.  No hydronephrosis.    There is prominent distal esophageal wall thickening.  A gastrostomy tube is in place.  No significant inflammatory changes of the small or large bowel.  Normal appendix.  No free air.    There is a Fernandez in the urinary bladder.  No pelvic free fluid.  Abdominal aorta normal in caliber.  Mild atherosclerotic disease.    There are no acute osseous findings.  Prominent heterotopic bone around the left hip.  Is                        Preliminary result by Jose Cullen MD (08/04/24 01:28:21)                   Impression:    1. There is interval development of a moderate sized hiatal hernia. There is significant thickening versus of walls of the distal esophagus. This is suggestive of pronounced (likely reflects) esophagitis. Correlate clinically.  2. There is pronounced likely dystrophic soft tissue calcification around the proximal left femur. Follow-up as clinically indicated.  3. No acute intraabdominal or pelvic solid organ or bowel pathology identified. Details and other findings as discussed above.               Narrative:    START OF REPORT:  Technique: CT of the abdomen and pelvis was performed with axial images as well as sagittal and coronal reconstruction images with intravenous contrast.    Comparison: Comparison is with study [id number] 21:46:12.    Clinical History: Abdominal pain, acute, nonlocalized;  Nausea/vomiting.    Dosage Information: Automated Exposure Control was utilized 1610.15 mGy.cm.    Findings:  Lines and Tubes: None.  Thorax:  Lungs: There is mild nonspecific dependent change at the lung bases. No focal infiltrate or consolidation is seen.  Pleura: No effusions or thickening. No pneumothorax is seen.  Heart: Mild to moderate cardiomegaly is seen. Pacer leads are seen in the visualized heart.  Abdomen:  Abdominal Wall: No abdominal wall pathology is seen.  Liver: The liver appears unremarkable.  Biliary System: No intrahepatic or extrahepatic biliary duct dilatation is seen.  Gallbladder: The gallbladder appears unremarkable.  Pancreas: Mild pancreatic atrophy is seen.  Spleen: The spleen appears unremarkable.  Adrenals: The adrenal glands appear unremarkable.  Kidneys: There is no significant change in the size or the number of the simple cysts in both kidneys with the largest in the right lower pole measuring 4.2 x 2.9 cm (series 2 image 74).  Aorta: There is mild calcification of the abdominal and visualized thoracic aorta.  IVC: Unremarkable.  Bowel:  Esophagus: There is interval development of a moderate sized hiatal hernia. There is significant thickening versus of walls of the distal esophagus.  Stomach: A PEG tube is seen inserted through the anterior epigastric wall with its balloon tip within the gastric lumen.  Duodenum: Unremarkable appearing duodenum.  Small Bowel: The small bowel appears unremarkable.  Colon: Nondistended.  Appendix: The appendix is not identified but no inflammatory changes are seen in the right lower quadrant to suggest appendicitis.  Peritoneum: No intraperitoneal free air or ascites is seen.    Pelvis:  Bladder: The bladder is nondistended and cannot be definitively evaluated. A Fernandez catheter balloon is seen within the urinary bladde lumen.  Male:  Prostate gland: The prostate gland is mildly enlarged. There are a few calcifications in the prostate gland.    Bony  structures:  Dorsal Spine: There is mild multilevel spondylosis of the visualized dorsal spine.  Bony Pelvis: There is moderate degenerative change of the bilateral hips. There is pronounced likely dystrophic soft tissue calcification around the proximal left femur.                                         X-Ray Chest AP Portable (Final result)  Result time 08/03/24 22:09:58      Final result by Sami Taylor MD (08/03/24 22:09:58)                   Impression:      Right infrahilar lower lung zone atelectasis and possible small left pleural effusion.      Electronically signed by: Sami Taylor  Date:    08/03/2024  Time:    22:09               Narrative:    EXAMINATION:  XR CHEST AP PORTABLE    CLINICAL HISTORY:  Vomiting, unspecified    TECHNIQUE:  One view    COMPARISON:  July 21, 2024.    FINDINGS:  Cardiopericardial silhouette appearance is similar.  Cardiac device electrodes are in similar location.  Left upper chest is obscured by the mandible.  No overt edema or consolidation.  There are right infrahilar lower lung lobe atelectatic changes.  Possible small left pleural effusion.                                    EKG:       Telemetry:  AFIB CVR    Physical Exam  Vitals and nursing note reviewed.   Constitutional:       Appearance: He is ill-appearing.   HENT:      Mouth/Throat:      Mouth: Mucous membranes are moist.   Neck:      Comments: Trach Present  Cardiovascular:      Rate and Rhythm: Normal rate. Rhythm irregular.      Pulses: Normal pulses.   Pulmonary:      Effort: Pulmonary effort is normal.   Abdominal:      General: Bowel sounds are normal.      Comments: PEG Tube Present    Skin:     General: Skin is warm.   Psychiatric:         Mood and Affect: Mood normal.         Behavior: Behavior normal.       Home Medications:   No current facility-administered medications on file prior to encounter.     Current Outpatient Medications on File Prior to Encounter   Medication Sig Dispense Refill     ascorbic Acid (VITAMIN C) 500 mg CpSR 500 mg 2 (two) times daily. Per PEG tube      busPIRone (BUSPAR) 5 MG Tab 5 mg by Per G Tube route 3 (three) times daily.      cholestyramine (QUESTRAN) 4 gram packet 4 g once daily. Via PEG tube      cholestyramine-aspartame (QUESTRAN LIGHT) 4 gram PwPk 1 packet (4 g total) by Per G Tube route 2 (two) times daily. 180 packet 3    diltiaZEM (CARDIZEM) 60 MG tablet 60 mg by Per G Tube route every 6 (six) hours.      famotidine (PEPCID) 20 MG tablet 1 tablet (20 mg total) by Per G Tube route 2 (two) times daily. 60 tablet 11    finasteride (PROSCAR) 5 mg tablet Take 1 tablet (5 mg total) by mouth once daily. 30 tablet 11    furosemide (LASIX) 80 MG tablet 80 mg by Per G Tube route as needed (for Edema).      hydrALAZINE (APRESOLINE) 50 MG tablet 75 mg by Per G Tube route every 8 (eight) hours as needed (for elevated BP).      L. acidophilus/L.bulgaricus (FLORANEX ORAL) 1 packet by PEG Tube route Daily.      levetiracetam 500 mg/5 mL (5 mL) Soln 1,500 mg by Per G Tube route 2 (two) times daily. Nursing home reports medication hold by MD until level redraw on Monday.      LIPITOR 10 mg tablet 10 mg by Per G Tube route once daily.      losartan (COZAAR) 100 MG tablet 100 mg by Per G Tube route once daily.      metoprolol tartrate (LOPRESSOR) 100 MG tablet 100 mg by Per G Tube route 2 (two) times daily.      multivitamin (THERAGRAN) per tablet 1 tablet by Per G Tube route once daily.      protein supplement (PROMOD PROTEIN ORAL) 30 mLs by Per G Tube route once daily.      QUEtiapine (SEROQUEL) 25 MG Tab 25 mg by Per G Tube route 2 (two) times daily.      scopolamine (TRANSDERM-SCOP) 1.3-1.5 mg (1 mg over 3 days) Place 1 patch onto the skin Every 3 (three) days.      tamsulosin (FLOMAX) 0.4 mg Cap Take 1 capsule (0.4 mg total) by mouth every evening. 30 capsule 11    venlafaxine 75 mg TR24 1 tablet by Per G Tube route 2 (two) times a day.      cloNIDine (CATAPRES) 0.1 MG tablet 0.1 mg  by Per G Tube route every 8 (eight) hours as needed (Give for systolic >180).      XARELTO 20 mg Tab 1 tablet by Per G Tube route nightly.       Current Schedule Inpatient Medications:   albuterol-ipratropium  3 mL Nebulization Q4H WAKE    atorvastatin  10 mg Per G Tube Daily    busPIRone  5 mg Per G Tube TID    diltiaZEM  60 mg Per G Tube Q6H    finasteride  5 mg Oral Daily    levetiracetam  1,500 mg Per G Tube BID    methylPREDNISolone injection (PEDS and ADULTS)  80 mg Intravenous Daily    metoprolol tartrate  50 mg Per G Tube BID    mupirocin   Nasal BID    pantoprazole  40 mg Intravenous BID    piperacillin-tazobactam (Zosyn) IV (PEDS and ADULTS) (extended infusion is not appropriate)  4.5 g Intravenous Q8H    QUEtiapine  25 mg Per G Tube BID    rivaroxaban  20 mg Per G Tube with dinner    scopolamine  1 patch Transdermal Q3 Days    tamsulosin  0.4 mg Oral QHS     Continuous Infusions:   dilTIAZem  0-15 mg/hr Intravenous Continuous 10 mL/hr at 08/04/24 1132 10 mg/hr at 08/04/24 1132    lactated ringers   Intravenous Continuous   Paused at 08/04/24 0823     Assessment:   Persistent AFIB - Currently CVR    - [id number] Score 4 (4.8% Stroke Risk per Year)    - on Xarelto  Aspiration PNA ?  Lactic Acidosis  Hypokalemia - Resolved   Esophagitis   Anemia   History of CVA    - s/p Peg and Trach   Small Left Pleural Effusion   SSS  HTN  HLD  CAD    - LHC (5.25.18): LM: Normal. Normal size and bifurcation. LAD: Abnormal. Large, mild atherosclerotic plaque, moderately large diagonals. Mid LAD 30% stenosis. The lesion was tubular and eccentric. LCX: abnormal and Large, mild atherosclerotic plaque, large OM with mild narrowing.OM 1 35% stenosis. RCA: normal. Large and no significant disease.      Arthritis  Dysphagia    - s/p PEG   Seizure  Liver Steatosis  Diverticulosis  BPH  Depression  Obesity   No known history of GI Bleed     Plan:   Continue Oral Cardizem 60mg QID and Metoprolol 100 mg oral BID  Wean Cardizem gtt  to keep HR < 100  Continue Xarelto for Stroke Risk Reduction   IV Antibiotics per Primary Team   Keep Mag > 2 and Potassium > 4   Labs in AM: CBC, CMP, and Mag     Thank you for your consult.     TRUMAN Jackson  Cardiology  [facility]  Emergency Dept  08/04/2024  Physician addendum:  I have seen and examined this patient as a split-shared visit with the JESSICA d/t complicated medical management of above problems written in assessment and high acuity requiring physician expertise in medical decision-making. I performed the substantive portion of the history and exam. Above medical decision-making is also formulated by me.    Cardiovascular exam:  S1, S2  Lungs:  fine crackles at bases.  Extremities:  1+ edema bilaterally    Plan:  Medications as above.  Continue supportive therapy.  We will follow up.      Yuriy Navarrete MD  Cardiologist

## 2024-08-04 NOTE — NURSING
Nurses Note -- 4 Eyes      8/4/2024   4:09 PM      Skin assessed during: Admit      [] No Altered Skin Integrity Present    []Prevention Measures Documented      [x] Yes- Altered Skin Integrity Present or Discovered   [x] LDA Added if Not in Epic (Describe Wound)   [x] New Altered Skin Integrity was Present on Admit and Documented in LDA   [x] Wound Image Taken    Wound Care Consulted? No    Attending Nurse:  Amauri Good LPN     Second RN/Staff Member:  Trung Gandhi RN

## 2024-08-04 NOTE — H&P
[facility] Hospital Medicine History & Physical Examination       Patient Name: Delio Daniel Jr.  MRN: [id number]  Patient Class: IP- Inpatient   Admission Date: 8/3/2024   Admitting Physician: Sekou Contreras MD   Length of Stay: 0  Attending Physician: Juancarlos Mota MD  Primary Care Provider: Jl Briones MD  Face-to-Face encounter date: 08/04/2024  Code Status: Full Code   Chief Complaint: Emesis (Pt arrives via EMS from [facility], pt had vomiting episode then began vomiting from his trach nsg home staff became concerned and called ems, pt was also in afib rvr when ems arrived they gave 20cardizem 4zofran and 250 saline)        Patient information was obtained from patient, patient's family, past medical records and ER records.     HISTORY OF PRESENT ILLNESS:   Delio Daniel Jr. is a 69 y.o. 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 The patient presented to [facility] on 8/3/2024 with a primary complaint of vomiting from his trach.    While in route EMS reported patient vomiting from his mouth. He went into AFib RVR in which she was given 20 mg of Cardizem, 4 mg of Zofran and a 250 mL bolus prior to arrival to the ED. upon presentation to the ED temperature 98.8° F, reaching a max of 100.5, temperature 110, blood pressure 143/90, respiratory rate 25 and SpO2 100% on room air.  Labs with H&H 13.4/42, potassium 3.4, alkaline phosphatase 175, troponin 0.016, lactic acid 2.4.  Influenza A/B, RSV and SARS-COV-2 PCR negative.  UA with trace mucus, no squamous epithelial cells, trace bacteria, 11-20 WBCs, greater than 100 RBCs, 25 leukocyte esterase, 2+ blood and 2+ protein.  EKG atrial fibrillation with rapid ventricular rate of 118.  Chest  x-ray with right infra hilar lower lung zone atelectasis and possible small left pleural effusion.  CT abdomen pelvis prominent distal esophageal wall thickening suggestive of esophagitis but no otherwise acute findings.  In ED patient received DuoNebs, Cardizem, Reglan, Zofran, Protonix and Zosyn.  Patient was admitted to hospital medicine services for further medical management.    PAST MEDICAL HISTORY:     Past Medical History:   Diagnosis Date    Arthritis     Atrial fibrillation     BPH (benign prostatic hyperplasia)     Cardiac arrest     Coronary artery disease     Cyst, kidney, acquired     Diverticulosis     Hyperlipidemia     Hypertension     MI (myocardial infarction)     Obesity     Steatosis of liver     Stroke        PAST SURGICAL HISTORY:     Past Surgical History:   Procedure Laterality Date    A-V CARDIAC PACEMAKER INSERTION Right     CARDIAC CATHETERIZATION      COLONOSCOPY W/ BIOPSIES      CRANIECTOMY Right 12/20/2023    Procedure: CRANIECTOMY;  Surgeon: Artem Can MD;  Location: [facility] OR;  Service: Neurosurgery;  Laterality: Right;    ESOPHAGOGASTRODUODENOSCOPY W/ PEG N/A 1/2/2024    Procedure: PEG;  Surgeon: Tani Day MD;  Location: [facility] ENDOSCOPY;  Service: Gastroenterology;  Laterality: N/A;    excision of colon      TRACHEOSTOMY N/A 12/29/2023    Procedure: CREATION, TRACHEOSTOMY;  Surgeon: Patricia Winslow MD;  Location: [facility] OR;  Service: ENT;  Laterality: N/A;  REQ 1130 //  NEEDS 2 SCRUBS       ALLERGIES:   Patient has no known allergies.    FAMILY HISTORY:   Reviewed and negative    SOCIAL HISTORY:     Social History     Tobacco Use    Smoking status: Never    Smokeless tobacco: Never   Substance Use Topics    Alcohol use: Not Currently        Screening for Social Drivers for health:  Patient screened for food insecurity, housing instability, transportation needs, utility difficulties, and interpersonal safety (select all that apply as identified as concern)  []Housing or  Food  []Transportation Needs  []Utility Difficulties  []Interpersonal safety  []None    HOME MEDICATIONS:     Prior to Admission medications    Medication Sig Start Date End Date Taking? Authorizing Provider   ascorbic Acid (VITAMIN C) 500 mg CpSR 500 mg 2 (two) times daily. Per PEG tube   Yes Provider, Historical   busPIRone (BUSPAR) 5 MG Tab 5 mg by Per G Tube route 3 (three) times daily. 3/6/24  Yes Provider, Historical   cholestyramine (QUESTRAN) 4 gram packet 4 g once daily. Via PEG tube   Yes Provider, Historical   cholestyramine-aspartame (QUESTRAN LIGHT) 4 gram PwPk 1 packet (4 g total) by Per G Tube route 2 (two) times daily. 3/4/24 3/4/25 Yes Su Garcia FNP   diltiaZEM (CARDIZEM) 60 MG tablet 60 mg by Per G Tube route every 6 (six) hours. 3/6/24  Yes Provider, Historical   famotidine (PEPCID) 20 MG tablet 1 tablet (20 mg total) by Per G Tube route 2 (two) times daily. 3/4/24 3/4/25 Yes Su Garcia FNP   finasteride (PROSCAR) 5 mg tablet Take 1 tablet (5 mg total) by mouth once daily. 3/5/24 3/5/25 Yes Su Garcia FNP   furosemide (LASIX) 80 MG tablet 80 mg by Per G Tube route as needed (for Edema). 5/7/24  Yes Provider, Historical   hydrALAZINE (APRESOLINE) 50 MG tablet 75 mg by Per G Tube route every 8 (eight) hours as needed (for elevated BP). 5/8/24  Yes Provider, Historical   L. acidophilus/L.bulgaricus (FLORANEX ORAL) 1 packet by PEG Tube route Daily.   Yes Provider, Historical   levetiracetam 500 mg/5 mL (5 mL) Soln 1,500 mg by Per G Tube route 2 (two) times daily. Nursing home reports medication hold by MD until level redraw on Monday.   Yes Provider, Historical   LIPITOR 10 mg tablet 10 mg by Per G Tube route once daily. 3/7/24  Yes Provider, Historical   losartan (COZAAR) 100 MG tablet 100 mg by Per G Tube route once daily. 3/7/24  Yes Provider, Historical   metoprolol tartrate (LOPRESSOR) 100 MG tablet 100 mg by Per G Tube route 2 (two) times daily.   Yes Provider,  Historical   multivitamin (THERAGRAN) per tablet 1 tablet by Per G Tube route once daily.   Yes Provider, Historical   protein supplement (PROMOD PROTEIN ORAL) 30 mLs by Per G Tube route once daily.   Yes Provider, Historical   QUEtiapine (SEROQUEL) 25 MG Tab 25 mg by Per G Tube route 2 (two) times daily.   Yes Provider, Historical   scopolamine (TRANSDERM-SCOP) 1.3-1.5 mg (1 mg over 3 days) Place 1 patch onto the skin Every 3 (three) days. 7/15/24  Yes Provider, Historical   tamsulosin (FLOMAX) 0.4 mg Cap Take 1 capsule (0.4 mg total) by mouth every evening. 3/4/24 3/4/25 Yes Su Garcia, FNP   venlafaxine 75 mg TR24 1 tablet by Per G Tube route 2 (two) times a day. 3/6/24  Yes Provider, Historical   cloNIDine (CATAPRES) 0.1 MG tablet 0.1 mg by Per G Tube route every 8 (eight) hours as needed (Give for systolic >180). 7/12/24   Provider, Historical   XARELTO 20 mg Tab 1 tablet by Per G Tube route nightly. 3/6/24   Provider, Historical       REVIEW OF SYSTEMS:   Except as documented, all other systems reviewed and negative     PHYSICAL EXAM:     VITAL SIGNS: 24 HRS MIN & MAX LAST   Temp  Min: 98.8 °F (37.1 °C)  Max: 100.5 °F (38.1 °C) (!) 100.5 °F (38.1 °C)   BP  Min: 103/83  Max: 163/106 117/72   Pulse  Min: 84  Max: 159  84   Resp  Min: 11  Max: 37 17   SpO2  Min: 92 %  Max: 100 % (!) 92 %       See MD addendum      LABS AND IMAGING:     Recent Labs   Lab 08/03/24 2141 08/04/24  0629   WBC 11.15 11.06   RBC 5.17 4.50*   HGB 13.4* 11.8*   HCT 42.0 36.1*   MCV 81.2 80.2   MCH 25.9* 26.2*   MCHC 31.9* 32.7*   RDW 15.7 15.8    307   MPV 9.8 9.8       Recent Labs   Lab 08/03/24  2141 08/04/24  0629    143   K 3.4* 3.5    109*   CO2 23 22*   BUN 11.7 11.2   CREATININE 0.72* 0.76   CALCIUM 8.9 8.4*   MG 2.10 2.00   ALBUMIN 3.4 3.4   ALKPHOS 175* 168*   ALT 23 21   AST 23 22   BILITOT 0.6 1.0       Microbiology Results (last 7 days)       Procedure Component Value Units Date/Time    Respiratory  Culture [[id number]]     Order Status: Sent Specimen: Respiratory from Trachael Aspirate     Urine culture [[id number]] Collected: 08/04/24 0240    Order Status: Sent Specimen: Urine Updated: 08/04/24 0305    Blood culture #1 **CANNOT BE ORDERED STAT** [[id number]] Collected: 08/03/24 2156    Order Status: Resulted Specimen: Blood Updated: 08/03/24 2159    Blood culture #2 **CANNOT BE ORDERED STAT** [[id number]] Collected: 08/03/24 2156    Order Status: Resulted Specimen: Blood Updated: 08/03/24 2159             CT Abdomen Pelvis With IV Contrast NO Oral Contrast  Narrative: EXAMINATION:  CT ABDOMEN PELVIS WITH IV CONTRAST    CLINICAL HISTORY:  Abdominal pain, acute, nonlocalized;Nausea/vomiting;    TECHNIQUE:  Helical acquisition through the abdomen and pelvis with IV contrast.  Three plane reconstructions were provided for review. DLP 1610 mGycm. Automatic exposure control, adjustment of mA/kV or iterative reconstruction technique was used to reduce radiation.    COMPARISON:  18 July 2024    FINDINGS:  Motion degraded scan.    The limited imaged lung bases are clear.    No acute findings liver, gallbladder, spleen, pancreas or adrenals.  No hydronephrosis.    There is prominent distal esophageal wall thickening.  A gastrostomy tube is in place.  No significant inflammatory changes of the small or large bowel.  Normal appendix.  No free air.    There is a Fernandez in the urinary bladder.  No pelvic free fluid.  Abdominal aorta normal in caliber.  Mild atherosclerotic disease.    There are no acute osseous findings.  Prominent heterotopic bone around the left hip.  Is  Impression: 1. Prominent distal esophageal wall thickening suggestive of esophagitis.  2. Otherwise no acute abdominopelvic findings.  Chronic findings above.  3. No significant discrepancy with the preliminary report.    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  Appreciate recommendations   - Continue with amiodarone drip   - Telemetry monitoring  - Resume appropriate home medications when deemed necessary   - Labs in AM      VTE Prophylaxis: will be placed on SCD for DVT prophylaxis and will be advised to be as mobile as possible and sit in a chair as tolerated      __________________________________________________________________________  INPATIENT LIST OF MEDICATIONS     Current Facility-Administered Medications:     acetaminophen oral solution 999.0148 mg, 999.0148 mg, Oral, Q8H PRN, Sekou Contreras MD, 999.0148 mg at 08/04/24 0643    albuterol-ipratropium 2.5 mg-0.5 mg/3 mL nebulizer solution 3 mL, 3 mL, Nebulization, Q4H WAKE, Juancarlos Mota MD    aluminum-magnesium hydroxide-simethicone 200-200-20 mg/5 mL suspension 30 mL, 30 mL, Oral, QID PRN, Tania Butts AGACNP-BC    diltiaZEM 125 mg in dextrose 5 % 125 mL IVPB (ready to mix) (titrating), 0-15 mg/hr, Intravenous, Continuous, Robin Diego IV, MD, Last Rate: 10 mL/hr at 08/04/24 1132, 10 mg/hr at 08/04/24 1132    lactated ringers infusion, , Intravenous, Continuous, Tania Butts AGACNP-BC, Paused at 08/04/24 0823    melatonin tablet 6 mg, 6 mg, Oral, Nightly PRN, Tania Butts, AGACNP-BC    mupirocin 2 % ointment, , Nasal, BID, Sekou Contreras MD, Given at 08/04/24 0927    naloxone 0.4 mg/mL injection 0.02 mg, 0.02 mg, Intravenous, PRN, Benjamín,  Tania COHEN AGACNP-BC    ondansetron injection 4 mg, 4 mg, Intravenous, Q4H PRN, Tania Butts AGACNP-BC    pantoprazole injection 40 mg, 40 mg, Intravenous, BID, Juancarlos Mota MD, 40 mg at 08/04/24 0927    piperacillin-tazobactam (ZOSYN) 4.5 g in D5W 100 mL IVPB (MB+), 4.5 g, Intravenous, Q8H, Juancarlos Mota MD, Last Rate: 25 mL/hr at 08/04/24 0832, 4.5 g at 08/04/24 0832    polyethylene glycol packet 17 g, 17 g, Oral, BID PRN, Tania Butts AGACNP-BC    prochlorperazine injection Soln 5 mg, 5 mg, Intravenous, Q6H PRN, Tania Butts AGACNP-BC    scopolamine 1.3-1.5 mg (1 mg over 3 days) 1 patch, 1 patch, Transdermal, Q3 Days, Juancarlos Mota MD, 1 patch at 08/04/24 1054    simethicone chewable tablet 80 mg, 1 tablet, Oral, QID PRN, Tania Butts AGACNP-BC    Current Outpatient Medications:     ascorbic Acid (VITAMIN C) 500 mg CpSR, 500 mg 2 (two) times daily. Per PEG tube, Disp: , Rfl:     busPIRone (BUSPAR) 5 MG Tab, 5 mg by Per G Tube route 3 (three) times daily., Disp: , Rfl:     cholestyramine (QUESTRAN) 4 gram packet, 4 g once daily. Via PEG tube, Disp: , Rfl:     cholestyramine-aspartame (QUESTRAN LIGHT) 4 gram PwPk, 1 packet (4 g total) by Per G Tube route 2 (two) times daily., Disp: 180 packet, Rfl: 3    diltiaZEM (CARDIZEM) 60 MG tablet, 60 mg by Per G Tube route every 6 (six) hours., Disp: , Rfl:     famotidine (PEPCID) 20 MG tablet, 1 tablet (20 mg total) by Per G Tube route 2 (two) times daily., Disp: 60 tablet, Rfl: 11    finasteride (PROSCAR) 5 mg tablet, Take 1 tablet (5 mg total) by mouth once daily., Disp: 30 tablet, Rfl: 11    furosemide (LASIX) 80 MG tablet, 80 mg by Per G Tube route as needed (for Edema)., Disp: , Rfl:     hydrALAZINE (APRESOLINE) 50 MG tablet, 75 mg by Per G Tube route every 8 (eight) hours as needed (for elevated BP)., Disp: , Rfl:     L. acidophilus/L.bulgaricus (FLORANEX ORAL), 1 packet by PEG Tube route Daily., Disp: , Rfl:     levetiracetam 500 mg/5 mL (5 mL)  Soln, 1,500 mg by Per G Tube route 2 (two) times daily. Nursing home reports medication hold by MD until level redraw on Monday., Disp: , Rfl:     LIPITOR 10 mg tablet, 10 mg by Per G Tube route once daily., Disp: , Rfl:     losartan (COZAAR) 100 MG tablet, 100 mg by Per G Tube route once daily., Disp: , Rfl:     metoprolol tartrate (LOPRESSOR) 100 MG tablet, 100 mg by Per G Tube route 2 (two) times daily., Disp: , Rfl:     multivitamin (THERAGRAN) per tablet, 1 tablet by Per G Tube route once daily., Disp: , Rfl:     protein supplement (PROMOD PROTEIN ORAL), 30 mLs by Per G Tube route once daily., Disp: , Rfl:     QUEtiapine (SEROQUEL) 25 MG Tab, 25 mg by Per G Tube route 2 (two) times daily., Disp: , Rfl:     scopolamine (TRANSDERM-SCOP) 1.3-1.5 mg (1 mg over 3 days), Place 1 patch onto the skin Every 3 (three) days., Disp: , Rfl:     tamsulosin (FLOMAX) 0.4 mg Cap, Take 1 capsule (0.4 mg total) by mouth every evening., Disp: 30 capsule, Rfl: 11    venlafaxine 75 mg TR24, 1 tablet by Per G Tube route 2 (two) times a day., Disp: , Rfl:     cloNIDine (CATAPRES) 0.1 MG tablet, 0.1 mg by Per G Tube route every 8 (eight) hours as needed (Give for systolic >180)., Disp: , Rfl:     XARELTO 20 mg Tab, 1 tablet by Per G Tube route nightly., Disp: , Rfl:       Scheduled Meds:   albuterol-ipratropium  3 mL Nebulization Q4H WAKE    mupirocin   Nasal BID    pantoprazole  40 mg Intravenous BID    piperacillin-tazobactam (Zosyn) IV (PEDS and ADULTS) (extended infusion is not appropriate)  4.5 g Intravenous Q8H    scopolamine  1 patch Transdermal Q3 Days     Continuous Infusions:   dilTIAZem  0-15 mg/hr Intravenous Continuous 10 mL/hr at 08/04/24 1132 10 mg/hr at 08/04/24 1132    lactated ringers   Intravenous Continuous   Paused at 08/04/24 0823     PRN Meds:.  Current Facility-Administered Medications:     acetaminophen, 999.0148 mg, Oral, Q8H PRN    aluminum-magnesium hydroxide-simethicone, 30 mL, Oral, QID PRN    melatonin, 6  mg, Oral, Nightly PRN    naloxone, 0.02 mg, Intravenous, PRN    ondansetron, 4 mg, Intravenous, Q4H PRN    polyethylene glycol, 17 g, Oral, BID PRN    prochlorperazine, 5 mg, Intravenous, Q6H PRN    simethicone, 1 tablet, Oral, QID PRN      Discharge Planning and Disposition: Anticipated discharge to be determined.    IElis PA, have reviewed and discussed the case with Dr. Juancarlos Mota MD      Please see the following addendum for further assessment and plan from there attending MD.      Portion of this note is dictated using EMR integrated voice recognition software and may be subjected to voice recognition errors not corrected with proofreading. Please  for clarification if needed.       Elis Contreras PA-C  08/04/2024

## 2024-08-04 NOTE — ED PROVIDER NOTES
Encounter Date: 8/3/2024    SCRIBE #1 NOTE: I, Vernon Roland, am scribing for, and in the presence of,  Robin Diego IV, MD. I have scribed the following portions of the note - Other sections scribed: HPI,ROS,PE.       History     Chief Complaint   Patient presents with    Emesis     Pt arrives via EMS from [facility], pt had vomiting episode then began vomiting from his trach nsg home staff became concerned and called ems, pt was also in afib rvr when ems arrived they gave 20cardizem 4zofran and 250 saline     70 y/o male with PMHx of afib, CAD, MI, Diverticulosis, HTN, HLD, obesity, steatosis of liver, CVA with left sided deficits, tracheostomy dependent, and PEG dependent presents to ED via EMS from [facility] for vomiting from trache. EMS reports while en route pt was vomiting from his mouth. EMS states pt started to go into Afib with RVR and gave 20 mg Cardizem, 4mg zofran, and 250 mL fluids en route to ED.      Per nursing home records, pt is a full CODE.     PCP: Jl Briones MD    History and ROS limited due to pt nonverbal.     The history is provided by the EMS personnel and medical records. History limited by: pt nonverbal. No  was used.     Review of patient's allergies indicates:  No Known Allergies  Past Medical History:   Diagnosis Date    Arthritis     Atrial fibrillation     BPH (benign prostatic hyperplasia)     Cardiac arrest     Coronary artery disease     Cyst, kidney, acquired     Diverticulosis     Hyperlipidemia     Hypertension     MI (myocardial infarction)     Obesity     Steatosis of liver     Stroke      Past Surgical History:   Procedure Laterality Date    A-V CARDIAC PACEMAKER INSERTION Right     CARDIAC CATHETERIZATION      COLONOSCOPY W/ BIOPSIES      CRANIECTOMY Right 12/20/2023    Procedure: CRANIECTOMY;  Surgeon: Artem Can MD;  Location: [facility];  Service: Neurosurgery;  Laterality: Right;    ESOPHAGOGASTRODUODENOSCOPY W/ PEG N/A  RAQUEL OU:  PRESCRIBED UV PROTECTION TO SLOW GROWTH. PRESCRIBE ARTIFICAL TEARS TO INCREASE COMFORT. 1/2/2024    Procedure: PEG;  Surgeon: Tani Day MD;  Location: [facility] ENDOSCOPY;  Service: Gastroenterology;  Laterality: N/A;    excision of colon      TRACHEOSTOMY N/A 12/29/2023    Procedure: CREATION, TRACHEOSTOMY;  Surgeon: Patricia Winslow MD;  Location: [facility] OR;  Service: ENT;  Laterality: N/A;  REQ 1130 //  NEEDS 2 SCRUBS     Family History   Problem Relation Name Age of Onset    Hypertension Mother      Hypertension Father      Hypertension Sister       Social History     Tobacco Use    Smoking status: Never    Smokeless tobacco: Never   Substance Use Topics    Alcohol use: Not Currently    Drug use: Not Currently     Review of Systems   Unable to perform ROS: Patient nonverbal       Physical Exam     Initial Vitals [08/03/24 1955]   BP Pulse Resp Temp SpO2   (!) 143/90 110 (!) 25 98.8 °F (37.1 °C) 100 %      MAP       --         Physical Exam    Nursing note and vitals reviewed.  Constitutional: He is not diaphoretic. No distress.        HENT:   Head: Normocephalic and atraumatic.   Helmet on  Actively vomiting from mouth     Neck: Neck supple.   Trach in place.   Some brown emesis suctioned from trach by respiratory      Normal range of motion.  Cardiovascular:  An irregularly irregular rhythm present.   Tachycardia present.         Pulmonary/Chest: Breath sounds normal. No respiratory distress.   SpO2 100% on room air.   Abdominal: Abdomen is soft. He exhibits no distension. There is no abdominal tenderness.   PEG tube in place that is clean, dry, and intact.    Musculoskeletal:         General: No edema.      Cervical back: Normal range of motion and neck supple.     Neurological: He is alert.   Residual left sided weakness from previous stroke.   Nonverbal, at baseline     Skin: Skin is warm. Capillary refill takes less than 2 seconds.         ED Course   Critical Care    Date/Time: 8/4/2024 4:34 AM    Performed by: Robin Diego IV, MD  Authorized by: Robin Diego IV, MD  Total  critical care time (exclusive of procedural time) : 38 minutes  Critical care time was exclusive of separately billable procedures and treating other patients.  Critical care was necessary to treat or prevent imminent or life-threatening deterioration of the following conditions: renal failure and metabolic crisis.  Critical care was time spent personally by me on the following activities: blood draw for specimens, development of treatment plan with patient or surrogate, discussions with consultants, evaluation of patient's response to treatment, interpretation of cardiac output measurements, examination of patient, ordering and performing treatments and interventions, obtaining history from patient or surrogate, ordering and review of laboratory studies, ordering and review of radiographic studies, pulse oximetry, re-evaluation of patient's condition and review of old charts.        Labs Reviewed   COMPREHENSIVE METABOLIC PANEL - Abnormal       Result Value    Sodium 144      Potassium 3.4 (*)     Chloride 107      CO2 23      Glucose 111      Blood Urea Nitrogen 11.7      Creatinine 0.72 (*)     Calcium 8.9      Protein Total 7.6      Albumin 3.4      Globulin 4.2 (*)     Albumin/Globulin Ratio 0.8 (*)     Bilirubin Total 0.6       (*)     ALT 23      AST 23      eGFR >60      Anion Gap 14.0      BUN/Creatinine Ratio 16     LACTIC ACID, PLASMA - Abnormal    Lactic Acid Level 2.4 (*)    URINALYSIS, REFLEX TO URINE CULTURE - Abnormal    Color, UA Yellow      Appearance, UA Clear      Specific Gravity, UA 1.047 (*)     pH, UA 8.0      Protein, UA 2+ (*)     Glucose, UA Normal      Ketones, UA Negative      Blood, UA 2+ (*)     Bilirubin, UA Negative      Urobilinogen, UA Normal      Nitrites, UA Negative      Leukocyte Esterase, UA 25 (*)     RBC, UA >100 (*)     WBC, UA 11-20 (*)     Bacteria, UA Trace      Squamous Epithelial Cells, UA None Seen      Mucous, UA Trace (*)     Calcium Oxalate Crystals, UA  Occasional (*)    CBC WITH DIFFERENTIAL - Abnormal    WBC 11.15      RBC 5.17      Hgb 13.4 (*)     Hct 42.0      MCV 81.2      MCH 25.9 (*)     MCHC 31.9 (*)     RDW 15.7      Platelet 282      MPV 9.8      Neut % 71.3      Lymph % 20.8      Mono % 6.8      Eos % 0.4      Basophil % 0.4      Lymph # 2.32      Neut # 7.96      Mono # 0.76      Eos # 0.04      Baso # 0.04      IG# 0.03      IG% 0.3      NRBC% 0.0      IPF 1.8     COVID/RSV/FLU A&B PCR - Normal    Influenza A PCR Not Detected      Influenza B PCR Not Detected      Respiratory Syncytial Virus PCR Not Detected      SARS-CoV-2 PCR Not Detected      Narrative:     The XpWebydo. Xpress SARS-CoV-2/FLU/RSV plus is a rapid, multiplexed real-time PCR test intended for the simultaneous qualitative detection and differentiation of SARS-CoV-2, Influenza A, Influenza B, and respiratory syncytial virus (RSV) viral RNA in either nasopharyngeal swab or nasal swab specimens.         LIPASE - Normal    Lipase Level 27     MAGNESIUM - Normal    Magnesium Level 2.10     TROPONIN I - Normal    Troponin-I 0.016     TSH - Normal    TSH 2.837     BLOOD CULTURE [facility]   BLOOD CULTURE [facility]   CULTURE, URINE   CBC W/ AUTO DIFFERENTIAL    Narrative:     The following orders were created for panel order CBC auto differential.  Procedure                               Abnormality         Status                     ---------                               -----------         ------                     CBC with Differential[[id number]]       Abnormal            Final result                 Please view results for these tests on the individual orders.   LACTIC ACID, PLASMA     EKG Readings: (Independently Interpreted)   Initial Reading: No STEMI. Rhythm: Atrial Fibrillation (RVR). Heart Rate: 118. Ectopy: No Ectopy. Conduction: Normal. ST Segments: Normal ST Segments. T Waves: Normal. Axis: Normal.   Taken at 20:18       Imaging Results              CT Abdomen Pelvis With IV Contrast NO Oral  Contrast (Preliminary result)  Result time 08/04/24 01:28:21      Preliminary result by Jose Cullen MD (08/04/24 01:28:21)                   Narrative:    START OF REPORT:  Technique: CT of the abdomen and pelvis was performed with axial images as well as sagittal and coronal reconstruction images with intravenous contrast.    Comparison: Comparison is with study [id number] 21:46:12.    Clinical History: Abdominal pain, acute, nonlocalized; Nausea/vomiting.    Dosage Information: Automated Exposure Control was utilized 1610.15 mGy.cm.    Findings:  Lines and Tubes: None.  Thorax:  Lungs: There is mild nonspecific dependent change at the lung bases. No focal infiltrate or consolidation is seen.  Pleura: No effusions or thickening. No pneumothorax is seen.  Heart: Mild to moderate cardiomegaly is seen. Pacer leads are seen in the visualized heart.  Abdomen:  Abdominal Wall: No abdominal wall pathology is seen.  Liver: The liver appears unremarkable.  Biliary System: No intrahepatic or extrahepatic biliary duct dilatation is seen.  Gallbladder: The gallbladder appears unremarkable.  Pancreas: Mild pancreatic atrophy is seen.  Spleen: The spleen appears unremarkable.  Adrenals: The adrenal glands appear unremarkable.  Kidneys: There is no significant change in the size or the number of the simple cysts in both kidneys with the largest in the right lower pole measuring 4.2 x 2.9 cm (series 2 image 74).  Aorta: There is mild calcification of the abdominal and visualized thoracic aorta.  IVC: Unremarkable.  Bowel:  Esophagus: There is interval development of a moderate sized hiatal hernia. There is significant thickening versus of walls of the distal esophagus.  Stomach: A PEG tube is seen inserted through the anterior epigastric wall with its balloon tip within the gastric lumen.  Duodenum: Unremarkable appearing duodenum.  Small Bowel: The small bowel appears unremarkable.  Colon: Nondistended.  Appendix: The  appendix is not identified but no inflammatory changes are seen in the right lower quadrant to suggest appendicitis.  Peritoneum: No intraperitoneal free air or ascites is seen.    Pelvis:  Bladder: The bladder is nondistended and cannot be definitively evaluated. A Fernandez catheter balloon is seen within the urinary bladde lumen.  Male:  Prostate gland: The prostate gland is mildly enlarged. There are a few calcifications in the prostate gland.    Bony structures:  Dorsal Spine: There is mild multilevel spondylosis of the visualized dorsal spine.  Bony Pelvis: There is moderate degenerative change of the bilateral hips. There is pronounced likely dystrophic soft tissue calcification around the proximal left femur.      Impression:  1. There is interval development of a moderate sized hiatal hernia. There is significant thickening versus of walls of the distal esophagus. This is suggestive of pronounced (likely reflects) esophagitis. Correlate clinically.  2. There is pronounced likely dystrophic soft tissue calcification around the proximal left femur. Follow-up as clinically indicated.  3. No acute intraabdominal or pelvic solid organ or bowel pathology identified. Details and other findings as discussed above.                                         X-Ray Chest AP Portable (Final result)  Result time 08/03/24 22:09:58      Final result by Sami Taylor MD (08/03/24 22:09:58)                   Impression:      Right infrahilar lower lung zone atelectasis and possible small left pleural effusion.      Electronically signed by: Sami Taylor  Date:    08/03/2024  Time:    22:09               Narrative:    EXAMINATION:  XR CHEST AP PORTABLE    CLINICAL HISTORY:  Vomiting, unspecified    TECHNIQUE:  One view    COMPARISON:  July 21, 2024.    FINDINGS:  Cardiopericardial silhouette appearance is similar.  Cardiac device electrodes are in similar location.  Left upper chest is obscured by the mandible.  No overt edema  or consolidation.  There are right infrahilar lower lung lobe atelectatic changes.  Possible small left pleural effusion.                                       Medications   diltiaZEM 125 mg in dextrose 5 % 125 mL IVPB (ready to mix) (titrating) (15 mg/hr Intravenous Rate/Dose Change 8/3/24 2307)   ondansetron injection 4 mg (4 mg Intravenous Given 8/3/24 2037)   diltiaZEM injection 20 mg (20 mg Intravenous Given 8/3/24 2106)   metoclopramide injection 10 mg (10 mg Intravenous Given 8/3/24 2212)   lactated ringers bolus 1,000 mL (0 mLs Intravenous Stopped 8/4/24 0128)   piperacillin-tazobactam (ZOSYN) 4.5 g in D5W 100 mL IVPB (MB+) (0 g Intravenous Stopped 8/4/24 0105)   iohexoL (OMNIPAQUE 350) injection 100 mL (100 mLs Intravenous Given 8/4/24 0047)   pantoprazole injection 40 mg (40 mg Intravenous Given 8/4/24 0227)     Medical Decision Making  69-year-old history of stroke trach and PEG dependent - presenting with vomiting as well as some output from trach  Exam as above mainly vomiting from above although likely aspirated into trach  Will inflate cuff to prevent further aspiration while patient was here obtain workup treat pain nausea reassess    The differential diagnosis includes, but is not limited to:   Aspiration pneumonitis, pancreatitis, gastroenteritis, gastritis, electrolyte derangement, sepsis, afib, nstemi     Problems Addressed:  Aspiration into airway: acute illness or injury that poses a threat to life or bodily functions  Atrial fibrillation with RVR: acute illness or injury that poses a threat to life or bodily functions  Esophagitis: acute illness or injury that poses a threat to life or bodily functions  Hypoxia: acute illness or injury that poses a threat to life or bodily functions  Vomiting: acute illness or injury that poses a threat to life or bodily functions    Amount and/or Complexity of Data Reviewed  Independent Historian: EMS     Details:  EMS reports while en route pt was vomiting  from his mouth. EMS states pt started to go into Afib with RVR and gave 20 mg Cardizem, 4mg zofran, and 250 mL fluids en route to ED.      .   External Data Reviewed: notes.     Details: Per nursing home records, pt is a full CODE.     Labs: ordered.  Radiology: ordered and independent interpretation performed.     Details: CXR - no obvious infiltrates, consolidations, pleural effusions, or pneumothorax.      ECG/medicine tests: ordered and independent interpretation performed.     Details: Initial Reading: No STEMI. Rhythm: Atrial Fibrillation (RVR). Heart Rate: 118. Ectopy: No Ectopy. Conduction: Normal. ST Segments: Normal ST Segments. T Waves: Normal. Axis: Normal.   Taken at 20:18   Discussion of management or test interpretation with external provider(s): [facility] - will admit     Risk  Prescription drug management.  Drug therapy requiring intensive monitoring for toxicity.  Decision regarding hospitalization.    Critical Care  Total time providing critical care: 38 minutes            Scribe Attestation:   Scribe #1: I performed the above scribed service and the documentation accurately describes the services I performed. I attest to the accuracy of the note.    Attending Attestation:           Physician Attestation for Scribe:  Physician Attestation Statement for Scribe #1: I, Robin Diego IV, MD, reviewed documentation, as scribed by Vernon Roland in my presence, and it is both accurate and complete.             ED Course as of 08/04/24 0432   Sat Aug 03, 2024   2115 69-year-old history of stroke trach and PEG dependent - presenting with vomiting as well as some output from trach  Exam as above mainly vomiting from above although likely aspirated into trach  Will inflate cuff to prevent further aspiration while patient was here obtain workup treat pain nausea reassess    Differential diagnosis (including but not limited to):   Pancreaitis, electrolyte derangement, gastroenteritis, gastritis, sbo, viral  syndrome, sepsis, uti, pneumonia  [AC]   Sun Aug 04, 2024   1288 Hospitalist will admit    [AC]      ED Course User Index  [AC] Robin Diego IV, MD                           Clinical Impression:  Final diagnoses:  [R11.10] Vomiting  [I48.91] Atrial fibrillation with RVR (Primary)  [R09.02] Hypoxia  [K20.90] Esophagitis  [T17.908A] Aspiration into airway          ED Disposition Condition    Admit Stable                Robin Diego IV, MD  08/04/24 7616

## 2024-08-05 LAB
ALBUMIN SERPL-MCNC: 3.1 G/DL (ref 3.4–4.8)
ALBUMIN/GLOB SERPL: 0.9 RATIO (ref 1.1–2)
ALP SERPL-CCNC: 147 UNIT/L (ref 40–150)
ALT SERPL-CCNC: 20 UNIT/L (ref 0–55)
ANION GAP SERPL CALC-SCNC: 9 MEQ/L
AST SERPL-CCNC: 19 UNIT/L (ref 5–34)
BASOPHILS # BLD AUTO: 0.01 X10(3)/MCL
BASOPHILS NFR BLD AUTO: 0.2 %
BILIRUB SERPL-MCNC: 0.8 MG/DL
BUN SERPL-MCNC: 11.1 MG/DL (ref 8.4–25.7)
CALCIUM SERPL-MCNC: 8.6 MG/DL (ref 8.8–10)
CHLORIDE SERPL-SCNC: 107 MMOL/L (ref 98–107)
CO2 SERPL-SCNC: 26 MMOL/L (ref 23–31)
CREAT SERPL-MCNC: 0.73 MG/DL (ref 0.73–1.18)
CREAT/UREA NIT SERPL: 15
EOSINOPHIL # BLD AUTO: 0 X10(3)/MCL (ref 0–0.9)
EOSINOPHIL NFR BLD AUTO: 0 %
ERYTHROCYTE [DISTWIDTH] IN BLOOD BY AUTOMATED COUNT: 15.9 % (ref 11.5–17)
GFR SERPLBLD CREATININE-BSD FMLA CKD-EPI: >60 ML/MIN/1.73/M2
GLOBULIN SER-MCNC: 3.3 GM/DL (ref 2.4–3.5)
GLUCOSE SERPL-MCNC: 144 MG/DL (ref 82–115)
HCT VFR BLD AUTO: 37.5 % (ref 42–52)
HGB BLD-MCNC: 10.9 G/DL (ref 14–18)
IMM GRANULOCYTES # BLD AUTO: 0.01 X10(3)/MCL (ref 0–0.04)
IMM GRANULOCYTES NFR BLD AUTO: 0.2 %
LYMPHOCYTES # BLD AUTO: 0.89 X10(3)/MCL (ref 0.6–4.6)
LYMPHOCYTES NFR BLD AUTO: 18.6 %
MAGNESIUM SERPL-MCNC: 2.3 MG/DL (ref 1.6–2.6)
MCH RBC QN AUTO: 26.3 PG (ref 27–31)
MCHC RBC AUTO-ENTMCNC: 29.1 G/DL (ref 33–36)
MCV RBC AUTO: 90.6 FL (ref 80–94)
MONOCYTES # BLD AUTO: 0.16 X10(3)/MCL (ref 0.1–1.3)
MONOCYTES NFR BLD AUTO: 3.3 %
NEUTROPHILS # BLD AUTO: 3.71 X10(3)/MCL (ref 2.1–9.2)
NEUTROPHILS NFR BLD AUTO: 77.7 %
NRBC BLD AUTO-RTO: 0 %
OHS QRS DURATION: 82 MS
OHS QTC CALCULATION: 456 MS
PHOSPHATE SERPL-MCNC: 4.6 MG/DL (ref 2.3–4.7)
PLATELET # BLD AUTO: 249 X10(3)/MCL (ref 130–400)
PLATELETS.RETICULATED NFR BLD AUTO: 1.5 % (ref 0.9–11.2)
PMV BLD AUTO: 9.8 FL (ref 7.4–10.4)
POTASSIUM SERPL-SCNC: 3.4 MMOL/L (ref 3.5–5.1)
PROT SERPL-MCNC: 6.4 GM/DL (ref 5.8–7.6)
RBC # BLD AUTO: 4.14 X10(6)/MCL (ref 4.7–6.1)
SODIUM SERPL-SCNC: 142 MMOL/L (ref 136–145)
WBC # BLD AUTO: 4.78 X10(3)/MCL (ref 4.5–11.5)

## 2024-08-05 PROCEDURE — 27000221 HC OXYGEN, UP TO 24 HOURS

## 2024-08-05 PROCEDURE — 94760 N-INVAS EAR/PLS OXIMETRY 1: CPT

## 2024-08-05 PROCEDURE — 99900031 HC PATIENT EDUCATION (STAT)

## 2024-08-05 PROCEDURE — 25000242 PHARM REV CODE 250 ALT 637 W/ HCPCS: Performed by: INTERNAL MEDICINE

## 2024-08-05 PROCEDURE — A4216 STERILE WATER/SALINE, 10 ML: HCPCS | Performed by: INTERNAL MEDICINE

## 2024-08-05 PROCEDURE — 94640 AIRWAY INHALATION TREATMENT: CPT

## 2024-08-05 PROCEDURE — 63600175 PHARM REV CODE 636 W HCPCS: Performed by: INTERNAL MEDICINE

## 2024-08-05 PROCEDURE — C1751 CATH, INF, PER/CENT/MIDLINE: HCPCS

## 2024-08-05 PROCEDURE — 76937 US GUIDE VASCULAR ACCESS: CPT

## 2024-08-05 PROCEDURE — 80053 COMPREHEN METABOLIC PANEL: CPT | Performed by: INTERNAL MEDICINE

## 2024-08-05 PROCEDURE — 25000003 PHARM REV CODE 250

## 2024-08-05 PROCEDURE — 84100 ASSAY OF PHOSPHORUS: CPT | Performed by: INTERNAL MEDICINE

## 2024-08-05 PROCEDURE — 25000003 PHARM REV CODE 250: Performed by: INTERNAL MEDICINE

## 2024-08-05 PROCEDURE — 85025 COMPLETE CBC W/AUTO DIFF WBC: CPT | Performed by: INTERNAL MEDICINE

## 2024-08-05 PROCEDURE — 25000003 PHARM REV CODE 250: Performed by: STUDENT IN AN ORGANIZED HEALTH CARE EDUCATION/TRAINING PROGRAM

## 2024-08-05 PROCEDURE — 36415 COLL VENOUS BLD VENIPUNCTURE: CPT | Performed by: INTERNAL MEDICINE

## 2024-08-05 PROCEDURE — 83735 ASSAY OF MAGNESIUM: CPT | Performed by: INTERNAL MEDICINE

## 2024-08-05 PROCEDURE — 99900026 HC AIRWAY MAINTENANCE (STAT)

## 2024-08-05 PROCEDURE — 99900035 HC TECH TIME PER 15 MIN (STAT)

## 2024-08-05 PROCEDURE — 36410 VNPNXR 3YR/> PHY/QHP DX/THER: CPT

## 2024-08-05 PROCEDURE — 21400001 HC TELEMETRY ROOM

## 2024-08-05 RX ORDER — SODIUM CHLORIDE 0.9 % (FLUSH) 0.9 %
10 SYRINGE (ML) INJECTION EVERY 6 HOURS
Status: DISCONTINUED | OUTPATIENT
Start: 2024-08-05 | End: 2024-08-10 | Stop reason: HOSPADM

## 2024-08-05 RX ORDER — SODIUM CHLORIDE 0.9 % (FLUSH) 0.9 %
10 SYRINGE (ML) INJECTION
Status: DISCONTINUED | OUTPATIENT
Start: 2024-08-05 | End: 2024-08-10 | Stop reason: HOSPADM

## 2024-08-05 RX ADMIN — FINASTERIDE 5 MG: 5 TABLET, FILM COATED ORAL at 10:08

## 2024-08-05 RX ADMIN — PIPERACILLIN AND TAZOBACTAM 4.5 G: 4; .5 INJECTION, POWDER, LYOPHILIZED, FOR SOLUTION INTRAVENOUS; PARENTERAL at 05:08

## 2024-08-05 RX ADMIN — QUETIAPINE FUMARATE 25 MG: 25 TABLET ORAL at 10:08

## 2024-08-05 RX ADMIN — DILTIAZEM HYDROCHLORIDE 60 MG: 60 TABLET, FILM COATED ORAL at 06:08

## 2024-08-05 RX ADMIN — LEVETIRACETAM 1500 MG: 100 SOLUTION ORAL at 10:08

## 2024-08-05 RX ADMIN — RIVAROXABAN 20 MG: 10 TABLET, FILM COATED ORAL at 05:08

## 2024-08-05 RX ADMIN — IPRATROPIUM BROMIDE AND ALBUTEROL SULFATE 3 ML: 2.5; .5 SOLUTION RESPIRATORY (INHALATION) at 12:08

## 2024-08-05 RX ADMIN — BUSPIRONE HYDROCHLORIDE 5 MG: 5 TABLET ORAL at 05:08

## 2024-08-05 RX ADMIN — Medication: at 09:08

## 2024-08-05 RX ADMIN — MUPIROCIN: 20 OINTMENT TOPICAL at 09:08

## 2024-08-05 RX ADMIN — LORAZEPAM 1 MG: 1 TABLET ORAL at 10:08

## 2024-08-05 RX ADMIN — BUSPIRONE HYDROCHLORIDE 5 MG: 5 TABLET ORAL at 10:08

## 2024-08-05 RX ADMIN — METOPROLOL TARTRATE 100 MG: 50 TABLET, FILM COATED ORAL at 10:08

## 2024-08-05 RX ADMIN — METHYLPREDNISOLONE SODIUM SUCCINATE 80 MG: 40 INJECTION, POWDER, FOR SOLUTION INTRAMUSCULAR; INTRAVENOUS at 10:08

## 2024-08-05 RX ADMIN — IPRATROPIUM BROMIDE AND ALBUTEROL SULFATE 3 ML: 2.5; .5 SOLUTION RESPIRATORY (INHALATION) at 03:08

## 2024-08-05 RX ADMIN — ATORVASTATIN CALCIUM 10 MG: 10 TABLET, FILM COATED ORAL at 10:08

## 2024-08-05 RX ADMIN — DILTIAZEM HYDROCHLORIDE 60 MG: 60 TABLET, FILM COATED ORAL at 12:08

## 2024-08-05 RX ADMIN — DILTIAZEM HYDROCHLORIDE 60 MG: 60 TABLET, FILM COATED ORAL at 05:08

## 2024-08-05 RX ADMIN — POTASSIUM BICARBONATE 25 MEQ: 977.5 TABLET, EFFERVESCENT ORAL at 11:08

## 2024-08-05 RX ADMIN — SODIUM CHLORIDE, PRESERVATIVE FREE 10 ML: 5 INJECTION INTRAVENOUS at 05:08

## 2024-08-05 RX ADMIN — IPRATROPIUM BROMIDE AND ALBUTEROL SULFATE 3 ML: 2.5; .5 SOLUTION RESPIRATORY (INHALATION) at 07:08

## 2024-08-05 RX ADMIN — TAMSULOSIN HYDROCHLORIDE 0.4 MG: 0.4 CAPSULE ORAL at 10:08

## 2024-08-05 RX ADMIN — PANTOPRAZOLE SODIUM 40 MG: 40 INJECTION, POWDER, LYOPHILIZED, FOR SOLUTION INTRAVENOUS at 10:08

## 2024-08-05 RX ADMIN — MUPIROCIN: 20 OINTMENT TOPICAL at 10:08

## 2024-08-05 RX ADMIN — IPRATROPIUM BROMIDE AND ALBUTEROL SULFATE 3 ML: 2.5; .5 SOLUTION RESPIRATORY (INHALATION) at 08:08

## 2024-08-05 RX ADMIN — PIPERACILLIN AND TAZOBACTAM 4.5 G: 4; .5 INJECTION, POWDER, LYOPHILIZED, FOR SOLUTION INTRAVENOUS; PARENTERAL at 10:08

## 2024-08-05 RX ADMIN — DILTIAZEM HYDROCHLORIDE 60 MG: 60 TABLET, FILM COATED ORAL at 11:08

## 2024-08-05 RX ADMIN — PIPERACILLIN AND TAZOBACTAM 4.5 G: 4; .5 INJECTION, POWDER, LYOPHILIZED, FOR SOLUTION INTRAVENOUS; PARENTERAL at 01:08

## 2024-08-05 NOTE — AI DETERIORATION ALERT
Artificial Intelligence Notification  [facility]  [address]  [place] [address]  Phone: [phone]    This documentation was triggered by an Artificial Intelligence Notification:    Admit Date: 8/3/2024   LOS: 0  Code Status: Full Code  : 1955  Age: 69 y.o.  Weight:   Wt Readings from Last 1 Encounters:   24 117.9 kg (260 lb)        Sex: male  Bed: [address] [place] [address] A  MRN: [id number]  Attending Physician: Sekou Contreras MD     Date of Alert: 2024  Time AI Alert Received:             Vitals:    24   BP:    Pulse: (!) 119   Resp: (!) 27   Temp:      SpO2: 100 %      Artificial Intelligence alert discussed with Provider:     Name: MÓNICA Ruffin   Date/Time of Provider Notification:       Patient Condition: Patient assessed, resting comfortably in bed. On 40% TC, respirations unlabored. No secretions noted. Per nurse, RT was at bedside performing trach care at time of previous VS assessment. Encouraged bedside staff to reach out to ICU for any concerns.

## 2024-08-05 NOTE — PLAN OF CARE
08/05/24 1330   Discharge Assessment   Assessment Type Discharge Planning Assessment   Confirmed/corrected address, phone number and insurance Yes   Confirmed Demographics Correct on Facesheet   Source of Information family  (Beth Daniel (Daughter)  [phone] (Mobile))   If unable to respond/provide information was family/caregiver contacted? Yes   Contact Name/Number eBth Daniel (Daughter)  [phone] (Mobile)   Communicated EDER with patient/caregiver Date not available/Unable to determine   Reason For Admission 69 y.o. Black or  male with a past medical history of hypertension, hyperlipidemia, coronary artery disease, atrial fibrillation on Xarelto, CVA with left-sided deficits and trach and PEG dependent, liver steatosis, diverticulosis, BPH and resident of [facility]. The patient presented to [facility] on 8/3/2024 with a primary complaint of vomiting from his trach.   People in Home   (FOC: The marvin is a long-term resident of [facility] with plans to return to this facility upon discharge.)   Facility Arrived From: FOC: The marvin is a long-term resident of [facility] with plans to return to this facility upon discharge.   Do you expect to return to your current living situation? Yes   Do you have help at home or someone to help you manage your care at home? Yes   Who are your caregiver(s) and their phone number(s)? FOC: The marvin is a long-term resident of [facility] with plans to return to this facility upon discharge.   Prior to hospitilization cognitive status: Not Oriented to Person;Not Oriented to Place;Not Oriented to Time   Current cognitive status: Not Oriented to Person;Not Oriented to Place;Not Oriented to Time   Walking or Climbing Stairs Difficulty yes   Walking or Climbing Stairs transferring difficulty, dependent   Mobility Management The patient is bedbound with a Jacoby lift used by staff members at [facility]  for all trasnfers from bed to wheelchair/Janice[employer]Ch[employer] PRN.   Dressing/Bathing Difficulty yes   Dressing/Bathing Management The [place]/CNA staff at [facility] assist with his hygiene needs PRN.   Home Accessibility wheelchair accessible   Home Layout Able to live on 1st floor   Equipment Currently Used at Home hospital bed;oxygen;lift device   Readmission within 30 days? Yes   Patient currently being followed by outpatient case management? No   Do you currently have service(s) that help you manage your care at home? Yes   Name and Contact number of agency FOC: The marvin is a long-term resident of [facility] with plans to return to this facility upon discharge.   Is the pt/caregiver preference to resume services with current agency Yes   Do you take prescription medications? Yes   Do you have prescription coverage? Yes   Coverage Payor: [employer] [facility] [facility] -   Do you have any problems affording any of your prescribed medications? No   Is the patient taking medications as prescribed? yes   Who is going to help you get home at discharge? The staff nurse at [facility] provide for his pharmacological needs and dispense all medications as per MD orders.   How do you get to doctors appointments? agency   Are you on dialysis? No   Do you take coumadin? No   Discharge Plan A Return to nursing home  (FOC: The patient will return to [facility] as he is a long-term resident of this facility.)   Discharge Plan B Return to Nursing Home   DME Needed Upon Discharge  none   Discharge Plan discussed with: Adult children  (Daughter: Beth Daniel: [phone])   Transition of Care Barriers None   Physical Activity   On average, how many days per week do you engage in moderate to strenuous exercise (like a brisk walk)? 5 days   On average, how many minutes do you engage in exercise at this level? 30 min   Financial Resource Strain   How hard is it for  you to pay for the very basics like food, housing, medical care, and heating? Not very   Housing Stability   In the last 12 months, was there a time when you were not able to pay the mortgage or rent on time? N   At any time in the past 12 months, were you homeless or living in a shelter (including now)? N   Transportation Needs   Has the lack of transportation kept you from medical appointments, meetings, work or from getting things needed for daily living? No   Food Insecurity   Within the past 12 months, you worried that your food would run out before you got the money to buy more. Never true   Within the past 12 months, the food you bought just didn't last and you didn't have money to get more. Never true   Stress   Do you feel stress - tense, restless, nervous, or anxious, or unable to sleep at night because your mind is troubled all the time - these days? Pt Unable   Social Isolation   How often do you feel lonely or isolated from those around you?  Patient unable to answer   Alcohol Use   Q1: How often do you have a drink containing alcohol? Never   Q2: How many drinks containing alcohol do you have on a typical day when you are drinking? None   Q3: How often do you have six or more drinks on one occasion? Never   Utilities   In the past 12 months has the electric, gas, oil, or water company threatened to shut off services in your home? No   Health Literacy   How often do you need to have someone help you when you read instructions, pamphlets, or other written material from your doctor or pharmacy? Patient unable to respond   The patient is a long-term resident of [facility] and will return to this facility upon discharge.

## 2024-08-05 NOTE — NURSING
Spoke with pts daughter Vonnie Daniel to get consent to place a midline. She stated that we can place a midline on pt.

## 2024-08-05 NOTE — NURSING
[facility] [address]    Patient Name:  Delio Daniel Jr.   MRN:  [id number]  Date: 8/5/2024  Diagnosis: <principal problem not specified>    History:     Past Medical History:   Diagnosis Date    Arthritis     Atrial fibrillation     BPH (benign prostatic hyperplasia)     Cardiac arrest     Coronary artery disease     Cyst, kidney, acquired     Diverticulosis     Hyperlipidemia     Hypertension     MI (myocardial infarction)     Obesity     Steatosis of liver     Stroke        Social History     Socioeconomic History    Marital status:     Number of children: 9   Occupational History    Occupation: retired   Tobacco Use    Smoking status: Never    Smokeless tobacco: Never   Substance and Sexual Activity    Alcohol use: Not Currently    Drug use: Not Currently    Sexual activity: Not Currently     Partners: Female     Social Determinants of Health     Financial Resource Strain: Low Risk  (8/5/2024)    Overall Financial Resource Strain (CARDIA)     Difficulty of Paying Living Expenses: Not very hard   Food Insecurity: No Food Insecurity (8/5/2024)    Hunger Vital Sign     Worried About Running Out of Food in the Last Year: Never true     Ran Out of Food in the Last Year: Never true   Transportation Needs: No Transportation Needs (8/5/2024)    TRANSPORTATION NEEDS     Transportation : No   Physical Activity: Sufficiently Active (8/5/2024)    Exercise Vital Sign     Days of Exercise per Week: 5 days     Minutes of Exercise per Session: 30 min   Stress: Patient Unable To Answer (8/5/2024)    [employer] - Occupational Stress Questionnaire     Feeling of Stress : Patient unable to answer   Housing Stability: Low Risk  (8/5/2024)    Housing Stability Vital Sign     Unable to Pay for Housing in the Last Year: No     Homeless in the Last Year: No       Precautions:     Allergies as of 08/03/2024    (No Known Allergies)       WOC Assessment  Details/Treatment   Consult received for sacrum bilateral, groin and left knee wounds. Patient unresponsive with trach in place, incontinent of liquid, brown stool. Wedge and heel boots in place. Left leg contracted with unstageable pressure injury to knee. Buttocks, perineum, bilateral groin, MASD.      08/05/24 1405   WOCN Assessment   WOCN Total Time (mins) 60   Visit Date 08/05/24   Visit Time 1405   Consult Type New   WOCN Speciality Wound   Wound pressure;moisture   Number of Wounds 3   Intervention assessed;changed;applied;chart review;orders   Skin Interventions   Device Skin Pressure Protection absorbent pad utilized/changed   Pressure Reduction Techniques pressure points protected   Positioning   Body Position turned   Head of Bed (HOB) Positioning HOB at 30 degrees   Positioning/Transfer Devices pillows;wedge   Pressure Injury Prevention    Heel protection technique Heel boot        Altered Skin Integrity 02/26/24 1100 lower Buttocks Moisture associated dermatitis   Date First Assessed/Time First Assessed: 02/26/24 1100   Altered Skin Integrity Present on Admission - Did Patient arrive to the hospital with altered skin?: suspected hospital acquired  Orientation: lower  Location: Buttocks  Primary Wound Type: Mois...   Wound Image    Dressing Appearance Open to air   Drainage Amount Small   Drainage Characteristics/Odor Clear;Serous   Appearance Red;Bleeding;Not granulating   Tissue loss description Partial thickness   Red (%), Wound Tissue Color 100 %   Periwound Area Denuded;Moist   Wound Edges Irregular   Wound Length (cm)   (width and length of buttocks to extened into perinuem and groin)        Wound 08/05/24 1420 Moisture associated dermatitis Right Groin   Date First Assessed/Time First Assessed: 08/05/24 1420   Present on Original Admission: Yes  Primary Wound Type: Moisture associated dermatitis  Side: Right  Location: Groin   Wound Image    Drainage Amount None   Appearance Pink;Red   Tissue loss  description Partial thickness   Periwound Area Intact;Redness   Wound Edges Irregular        Wound 08/05/24 1420 Moisture associated dermatitis Left Groin   Date First Assessed/Time First Assessed: 08/05/24 1420   Present on Original Admission: Yes  Primary Wound Type: Moisture associated dermatitis  Side: Left  Location: Groin   Wound Image    Dressing Appearance Open to air   Drainage Amount None   Appearance Pink;Red   Tissue loss description Partial thickness   Periwound Area Redness        Wound 08/05/24 1420 Pressure Injury Left Knee   Date First Assessed/Time First Assessed: 08/05/24 1420   Present on Original Admission: Yes  Primary Wound Type: Pressure Injury  Side: Left  Location: Knee   Wound Image    Pressure Injury Stage U   Dressing Appearance Open to air   Drainage Amount Small   Drainage Characteristics/Odor Clear;Serous   Appearance Maroon;Slough   Tissue loss description Full thickness   Periwound Area Intact   Wound Edges Defined   Wound Width (cm) 1.5 cm   Wound Depth (cm) 1.5 cm   Care Sterile normal saline   Dressing Applied  (silver alginate, bordered foam)         Recommendations made to primary team to Wash buttocks, groin, perineum daily with baby shampoo, rinse and dry well, then apply Desitin to groin, perineum, buttocks twice daily . Left knee silver alginate to wound bed, Immerse specialty mattress Orders placed.     08/05/2024

## 2024-08-05 NOTE — PROGRESS NOTES
[facility]    Cardiology  Progress Note    Patient Name: Delio Daniel Jr.  MRN: [id number]  Admission Date: 8/3/2024  Hospital Length of Stay: 1 days  Code Status: Full Code   Attending Physician: Sekou Contreras MD   Primary Care Physician: Jl Briones MD  Expected Discharge Date:   Principal Problem:<principal problem not specified>    Subjective:     Reason for Consult: AFIB     HPI: Mr. Daniel is a 70 y/o male with a history of AFIB on Xarelto, CVA, SSS,  HTN, HLD, CAD, who is known to [facility], Dr. Kimbrough. He presented to the ER on 8.3.24 from [facility] [place] for vomiting. Nursing home reports he was vomiting from his Trach. EMS reports vomiting from his mouth. EMS reports that patient was in AFIB RVR. He was given 20mg IV Cardizem and placed on a cardizem gtt. Significant labs include H&H 11.8/36.1, CO2 22, Chloride 109, Glucose 121, Calcium 8.4, , Lactic Acid 2.7. Flu, RSV, and COVID negative.UA positive for bacteria. Urine cultures and blood cultures positive. CT abdomen shows prominent distal esophageal wall thickening suggestive of esophagitis. CXR shows Right infrahilar lower lung zone atelectasis and possible small left pleural effusion.  [facility] has been consulted for AFIB management.     Hospital Course:   8.5.24: NAD, VSS. He denies SOB, CP, or nausea, tele review showed controlled afib. Cardizem infusion off.     PMH: AFIB on Xarelto, CVA, SSS,  HTN, HLD, CAD, Arthritis, Dysphagia, Seizure, Liver Steatosis, Diverticulosis, BPH, Depression, Obesity   PSH: PEG, AICD, LHC, Trach, Craniectomy, EGD, Colonoscopy,   Family History: Mother - HTN; Sister - HTN; Brother - HTN  Social History: Denies smoking, illicit drug use, and alcohol use.      Previous Cardiac Diagnostics:   ECHO (1.15.24):  TDS. No Definity contrast available for use. Left Ventricle: The left ventricle is normal in size. Moderately increased wall thickness. Normal wall  motion. There is normal systolic function. Ejection fraction by visual approximation is 55%. Right Ventricle: Normal right ventricular cavity size. Systolic function is borderline low. Left Atrium: Left atrium is severely dilated. Right Atrium: Right atrium is mildly dilated. Mitral Valve: There is bileaflet sclerosis. There is mild regurgitation. IVC/SVC: Normal venous pressure at 3 mmHg.     Venous US LUE (12.26.23):  There was no evidence of deep vein thrombosis in the left upper extremity.   A superficial thrombosis was identified in the left cephalic vein.      Carotid US (12.19.23):  The right internal carotid artery demonstrated less than 50% stenosis.  The left internal carotid artery demonstrated less than 50% stenosis.  The bilateral vertebral arteries were patent with antegrade flow.  Bilateral internal carotid arteries demonstrated decreased velocities starting from the common carotid arteries.      LHC (5.25.18):   LM: Normal. Normal size and bifurcation. LAD: Abnormal. Large, mild atherosclerotic plaque, moderately large diagonals. Mid LAD 30% stenosis. The lesion was tubular and eccentric. LCX: abnormal and Large, mild atherosclerotic plaque, large OM with mild narrowing.OM 1 35% stenosis. RCA: normal. Large and no significant disease.        Review of Systems   Reason unable to perform ROS: acuity of condition.       Objective:     Vital Signs (Most Recent):  Temp: 98.3 °F (36.8 °C) (08/05/24 1129)  Pulse: 77 (08/05/24 1200)  Resp: 20 (08/05/24 1200)  BP: 126/83 (08/05/24 1129)  SpO2: 96 % (08/05/24 1200) Vital Signs (24h Range):  Temp:  [98 °F (36.7 °C)-98.8 °F (37.1 °C)] 98.3 °F (36.8 °C)  Pulse:  [] 77  Resp:  [18-27] 20  SpO2:  [91 %-100 %] 96 %  BP: (113-142)/(76-97) 126/83   Weight: 117.9 kg (260 lb)  Body mass index is 37.31 kg/m².  SpO2: 96 %       Intake/Output Summary (Last 24 hours) at 8/5/2024 1340  Last data filed at 8/4/2024 2238  Gross per 24 hour   Intake --   Output 1075 ml  "  Net -1075 ml     Lines/Drains/Airways       Drain  Duration                  Gastrostomy/Enterostomy 01/02/24 1230  days              Airway  Duration             Adult Surgical Airway 12/29/23 1229 Shiley 6.0/ 75mm 220 days              Peripheral Intravenous Line  Duration                  Peripheral IV - Single Lumen 08/03/24 20 G Anterior;Left Hand 2 days                    Significant Labs:   Chemistries:   Recent Labs   Lab 08/03/24 2141 08/04/24  0629 08/05/24  0501    143 142   K 3.4* 3.5 3.4*    109* 107   CO2 23 22* 26   BUN 11.7 11.2 11.1   CREATININE 0.72* 0.76 0.73   CALCIUM 8.9 8.4* 8.6*   BILITOT 0.6 1.0 0.8   ALKPHOS 175* 168* 147   ALT 23 21 20   AST 23 22 19   GLUCOSE 111 121* 144*   MG 2.10 2.00 2.30   PHOS  --  3.4 4.6   TROPONINI 0.016 <0.010  --         CBC/Anemia Labs: Coags:    Recent Labs   Lab 08/03/24 2141 08/04/24  0629 08/05/24  0501   WBC 11.15 11.06 4.78   HGB 13.4* 11.8* 10.9*   HCT 42.0 36.1* 37.5*    307 249   MCV 81.2 80.2 90.6   RDW 15.7 15.8 15.9    No results for input(s): "PT", "INR", "APTT" in the last 168 hours.     Telemetry:  Afib CVR    Physical Exam  Constitutional:       Appearance: He is ill-appearing.   HENT:      Head: Normocephalic.      Mouth/Throat:      Mouth: Mucous membranes are dry.   Neck:      Comments: + Trach   Cardiovascular:      Rate and Rhythm: Normal rate. Rhythm irregular.      Pulses: Normal pulses.      Heart sounds: Normal heart sounds.   Pulmonary:      Effort: Pulmonary effort is normal. No respiratory distress.      Breath sounds: Normal breath sounds.   Abdominal:      General: Abdomen is flat.      Comments: + PEG         Current Schedule Inpatient Medications:   albuterol-ipratropium  3 mL Nebulization Q4H WAKE    atorvastatin  10 mg Per G Tube Daily    busPIRone  5 mg Per G Tube TID    diltiaZEM  60 mg Per G Tube Q6H    finasteride  5 mg Oral Daily    levetiracetam  1,500 mg Per G Tube BID    LORazepam  1 mg Per " G Tube BID    methylPREDNISolone injection (PEDS and ADULTS)  80 mg Intravenous Daily    metoprolol tartrate  100 mg Per G Tube BID    mupirocin   Nasal BID    pantoprazole  40 mg Intravenous BID    piperacillin-tazobactam (Zosyn) IV (PEDS and ADULTS) (extended infusion is not appropriate)  4.5 g Intravenous Q8H    QUEtiapine  25 mg Per G Tube BID    rivaroxaban  20 mg Per G Tube with dinner    scopolamine  1 patch Transdermal Q3 Days    tamsulosin  0.4 mg Oral QHS     Continuous Infusions:   dilTIAZem  0-15 mg/hr Intravenous Continuous 10 mL/hr at 08/04/24 1132 10 mg/hr at 08/04/24 1132    lactated ringers   Intravenous Continuous   Paused at 08/04/24 0823       Assessment:   Persistent AFIB - Currently CVR    - [id number] Score 4 (4.8% Stroke Risk per Year)    - on Xarelto  Aspiration PNA ?  Lactic Acidosis  Hypokalemia - Resolved   Esophagitis   Anemia   History of CVA    - s/p Peg and Trach   Small Left Pleural Effusion   SSS  HTN  HLD  CAD    - LHC (5.25.18): LM: Normal. Normal size and bifurcation. LAD: Abnormal. Large, mild atherosclerotic plaque, moderately large diagonals. Mid LAD 30% stenosis. The lesion was tubular and eccentric. LCX: abnormal and Large, mild atherosclerotic plaque, large OM with mild narrowing.OM 1 35% stenosis. RCA: normal. Large and no significant disease.      Arthritis  Dysphagia    - s/p PEG   Seizure  Liver Steatosis  Diverticulosis  BPH  Depression  Obesity   No known history of GI Bleed       Plan:   Cont Oral Cardizem 60mg QID and Metoprolol 100 mg oral BID  D/C Cardizem infusion   Cont. Xarelto for Stroke Risk Reduction   IV Antibiotics per Primary Team   Keep Mag > 2 and Potassium > 4   Monitor tele   Labs in AM: CBC, CMP, and Mag             Eleonora Kim NP  Cardiology  [facility]

## 2024-08-05 NOTE — CONSULTS
Inpatient Nutrition Assessment    Admit Date: 8/3/2024   Total duration of encounter: 2 days   Patient Age: 69 y.o.    Nutrition Recommendation/Prescription     Tube Feeding recs:  Impact Peptide 1.5 (receiving Pivot 1.5 as a substitute) goal rate 80ml/hr (Based on est run time 20h/day) + free water flush 100ml every 2h   Provides:  2400 kcal (102% est needs)  150 gm protein (106% est needs)  2432 ml free water (104% est needs)      Communication of Recommendations: reviewed with nurse    Nutrition Assessment     Malnutrition Assessment/Nutrition-Focused Physical Exam    Malnutrition Context: acute illness or injury (08/05/24 1402)  Malnutrition Level:  (does not meet criteria) (08/05/24 1402)  Energy Intake (Malnutrition):  (does not meet criteria) (08/05/24 1402)  Weight Loss (Malnutrition):  (does not meet criteria) (08/05/24 1402)  Subcutaneous Fat (Malnutrition):  (does not meet criteria) (08/05/24 1402)           Muscle Mass (Malnutrition):  (does not meet criteria) (08/05/24 1402)                                   A minimum of two characteristics is recommended for diagnosis of either severe or non-severe malnutrition.    Chart Review    Reason Seen: continuous nutrition monitoring and physician consult for tube feeding recs    Malnutrition Screening Tool Results   Have you recently lost weight without trying?: No  Have you been eating poorly because of a decreased appetite?: No   MST Score: 0   Diagnosis:  Acute Hypoxemic respiratory failure 2/2 bronchitis versus pneumonia  Sepsis 2/2 above  Reflux esophagitis   Atrial Fibrillation with RVR   Normocytic anemia   Hypokalemia     Relevant Medical History: HTN   HLD   CAD   AF on Xarelto   CVA with left-sided deficits, tracheostomy, peg   Hepatic steatosis   Diverticulosis    Scheduled Medications:  albuterol-ipratropium, 3 mL, Q4H WAKE  atorvastatin, 10 mg, Daily  busPIRone, 5 mg, TID  diltiaZEM, 60 mg, Q6H  finasteride, 5 mg, Daily  levetiracetam, 1,500 mg,  "BID  LORazepam, 1 mg, BID  methylPREDNISolone injection (PEDS and ADULTS), 80 mg, Daily  metoprolol tartrate, 100 mg, BID  mupirocin, , BID  pantoprazole, 40 mg, BID  piperacillin-tazobactam (Zosyn) IV (PEDS and ADULTS) (extended infusion is not appropriate), 4.5 g, Q8H  QUEtiapine, 25 mg, BID  rivaroxaban, 20 mg, with dinner  scopolamine, 1 patch, Q3 Days  tamsulosin, 0.4 mg, QHS    Continuous Infusions:  lactated ringers, Last Rate: Stopped (08/04/24 0823)    PRN Medications:  acetaminophen, 999.0148 mg, Q8H PRN  aluminum-magnesium hydroxide-simethicone, 30 mL, QID PRN  melatonin, 6 mg, Nightly PRN  naloxone, 0.02 mg, PRN  ondansetron, 4 mg, Q4H PRN  polyethylene glycol, 17 g, BID PRN  prochlorperazine, 5 mg, Q6H PRN  simethicone, 1 tablet, QID PRN    Calorie Containing IV Medications: no significant kcals from medications at this time    Recent Labs   Lab 08/03/24  2141 08/04/24  0629 08/05/24  0501    143 142   K 3.4* 3.5 3.4*   CALCIUM 8.9 8.4* 8.6*   PHOS  --  3.4 4.6   MG 2.10 2.00 2.30   CO2 23 22* 26   BUN 11.7 11.2 11.1   CREATININE 0.72* 0.76 0.73   EGFRNORACEVR >60 >60 >60   GLUCOSE 111 121* 144*   BILITOT 0.6 1.0 0.8   ALKPHOS 175* 168* 147   ALT 23 21 20   AST 23 22 19   ALBUMIN 3.4 3.4 3.1*   LIPASE 27  --   --    WBC 11.15 11.06 4.78   HGB 13.4* 11.8* 10.9*   HCT 42.0 36.1* 37.5*     Nutrition Orders:  Diet NPO  Tube Feedings/Formulas 80; Impact Peptide 1.5; Peg; Patel - Orange; 2 times daily; 100; Every 2 hours    Appetite/Oral Intake: NPO/not applicable  Factors Affecting Nutritional Intake: none identified  Social Needs Impacting Access to Food: none identified  Food/Nondenominational/Cultural Preferences: unable to obtain  Food Allergies: no known food allergies  Last Bowel Movement:  (bello)  Wound(s):  pressure injury left lateral malleous    Comments    8/5/24 pt on home tube feeding, weight appears stable in EMR     Anthropometrics    Height: 5' 10" (177.8 cm), Height Method: Estimated  Last " Weight: 117.9 kg (260 lb) (08/03/24 1955), Weight Method: Estimated  BMI (Calculated): 37.3  BMI Classification: obese grade II (BMI 35-39.9)        Ideal Body Weight (IBW), Male: 166 lb     % Ideal Body Weight, Male (lb): 156.63 %                          Usual Weight Provided By: EMR weight history    Wt Readings from Last 5 Encounters:   08/03/24 117.9 kg (260 lb)   07/18/24 117.9 kg (260 lb)   06/25/24 90.7 kg (200 lb)   05/15/24 90.7 kg (200 lb)   02/25/24 95.6 kg (210 lb 12.2 oz)     Weight Change(s) Since Admission:   Wt Readings from Last 1 Encounters:   08/03/24 1955 117.9 kg (260 lb)   Admit Weight: 117.9 kg (260 lb) (08/03/24 1955), Weight Method: Estimated    Estimated Needs    Weight Used For Calorie Calculations: 117.9 kg (259 lb 14.8 oz)  Energy Calorie Requirements (kcal): 2346 kcal (1.2 stress factor)  Energy Need Method: [place]-St Jeor  Weight Used For Protein Calculations: 117.9 kg (259 lb 14.8 oz)  Protein Requirements: 141gm (1.2 gm/kg)  Fluid Requirements (mL): 2346 1ml/kcal)        Enteral Nutrition     Formula: Impact Peptide 1.5 [place]  Rate/Volume: 80ml/hr  Water Flushes: 100ml every 2h  Additives/Modulars: none at this time  Route: PEG tube  Method: continuous  Total Nutrition Provided by Tube Feeding, Additives, and Flushes:  Calories Provided  2400 kcal/d, 102% needs   Protein Provided  150 g/d, 106% needs   Fluid Provided  2432 ml/d, 104% needs   Continuous feeding calculations based on estimated 20 hr/d run time unless otherwise stated.    Parenteral Nutrition     Patient not receiving parenteral nutrition support at this time.    Evaluation of Received Nutrient Intake    Calories: meeting estimated needs  Protein: meeting estimated needs    Patient Education     Not applicable.    Nutrition Diagnosis     PES: Increased nutrient needs (protein) related to increased protein energy demand for wound healing as evidenced by multiple wounds/pressure injury. (new)         Nutrition  Interventions     Intervention(s): collaboration with other providers    Goal: Meet greater than 80% of nutritional needs by follow-up. (new)  Goal: Maintain weight throughout hospitalization. (new)    Nutrition Goals & Monitoring     Dietitian will monitor: energy intake, enteral nutrition intake, and weight change  Discharge planning: resume home regimen  Nutrition Risk/Follow-Up: moderate (follow-up in 3-5 days)   Please consult if re-assessment needed sooner.

## 2024-08-05 NOTE — PROGRESS NOTES
[facility]  Hospital Medicine Progress Note        Chief Complaint: Inpatient Follow-up    HPI:   69 y.o. 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 The patient presented to [facility] on 8/3/2024 with a primary complaint of vomiting from his trach.     While in route EMS reported patient vomiting from his mouth. He went into AFib RVR in which she was given 20 mg of Cardizem, 4 mg of Zofran and a 250 mL bolus prior to arrival to the ED. upon presentation to the ED temperature 98.8° F, reaching a max of 100.5, temperature 110, blood pressure 143/90, respiratory rate 25 and SpO2 100% on room air.  Labs with H&H 13.4/42, potassium 3.4, alkaline phosphatase 175, troponin 0.016, lactic acid 2.4.  Influenza A/B, RSV and SARS-COV-2 PCR negative.  UA with trace mucus, no squamous epithelial cells, trace bacteria, 11-20 WBCs, greater than 100 RBCs, 25 leukocyte esterase, 2+ blood and 2+ protein.  EKG atrial fibrillation with rapid ventricular rate of 118.  Chest x-ray with right infra hilar lower lung zone atelectasis and possible small left pleural effusion.  CT abdomen pelvis prominent distal esophageal wall thickening suggestive of esophagitis but no otherwise acute findings.  In ED patient received DuoNebs, Cardizem, Reglan, Zofran, Protonix and Zosyn.  Patient was admitted to hospital medicine services for further medical management.  Continued on IV Zosyn, IV Solu-Medrol, DuoNebs.  Blood cultures negative.  Urine culture showing GNRs.  Cardiology on board for AF/RVR    Interval Hx:   Today, patient stated he was doing well and had no new complaints.  Remains on IV Cardizem infusion with HR in the 100s/110s.    Case was discussed with patient's nurse on the  floor.    Objective/physical exam:  General: alert male lying comfortably in bed, in no acute distress  HENT: oral and oropharyngeal mucosa moist, pink, with no erythema or exudates, no ear pain or discharge  Neck:  Tracheostomy in place; normal neck movement, no lymph nodes or swellings, no JVD or Carotid bruit  Respiratory: clear breathing sounds bilaterally, no crackles, rales, ronchi or wheezes  Cardiovascular: clear S1 and S2, no murmurs, rubs or gallops  Peripheral Vascular: no lesions, ulcers or erosions, normal peripheral pulses and no pedal edema  Gastrointestinal:  PEG in place; soft, non-tender, non-distended abdomen, no guarding, rigidity or rebound tenderness, normal bowel sounds  Integumentary: normal skin color, no rashes or lesions  Neuro: AAO x 3; motor strength 5/5 in B/L UEs & LEs; sensation intact to gross and fine touch B/L; CN II-XII grossly intact    VITAL SIGNS: 24 HRS MIN & MAX LAST   Temp  Min: 98 °F (36.7 °C)  Max: 98.8 °F (37.1 °C) 98.2 °F (36.8 °C)   BP  Min: 113/89  Max: 142/88 117/76   Pulse  Min: 83  Max: 119  (!) 118   Resp  Min: 16  Max: 27 20   SpO2  Min: 91 %  Max: 100 % 99 %     I have reviewed the following labs:  Recent Labs   Lab 08/03/24 2141 08/04/24  0629 08/05/24  0501   WBC 11.15 11.06 4.78   RBC 5.17 4.50* 4.14*   HGB 13.4* 11.8* 10.9*   HCT 42.0 36.1* 37.5*   MCV 81.2 80.2 90.6   MCH 25.9* 26.2* 26.3*   MCHC 31.9* 32.7* 29.1*   RDW 15.7 15.8 15.9    307 249   MPV 9.8 9.8 9.8     Recent Labs   Lab 08/03/24  2141 08/04/24  0629 08/04/24  1345 08/05/24  0501    143  --  142   K 3.4* 3.5  --  3.4*    109*  --  107   CO2 23 22*  --  26   BUN 11.7 11.2  --  11.1   CREATININE 0.72* 0.76  --  0.73   CALCIUM 8.9 8.4*  --  8.6*   PH  --   --  7.660*  --    MG 2.10 2.00  --  2.30   ALBUMIN 3.4 3.4  --  3.1*   ALKPHOS 175* 168*  --  147   ALT 23 21  --  20   AST 23 22  --  19   BILITOT 0.6 1.0  --  0.8     Assessment/Plan:  Acute Hypoxemic respiratory failure  2/2 bronchitis versus pneumonia  Sepsis 2/2 above  Reflux esophagitis   Atrial Fibrillation with RVR   Normocytic anemia   HTN   HLD   CAD   AF on Xarelto   CVA with left-sided deficits, tracheostomy, peg   Hepatic steatosis   Diverticulosis   BPH   Hypokalemia    Continues to be admitted   Reporting no new complaints  Saturating well on 6 L O2  Continued on IV Zosyn   Blood cultures negative  Urine culture growing GNRs   Cardiology on board; follow recommendations   On IV Cardizem infusion with uncontrolled HR   Continued on diltiazem 60 mg q.6, metoprolol tartrate 100 mg b.i.d.  Continued on IV Solu-Medrol 80 mg daily, DuoNebs q.4   On IV LR 75 mL/hour   On atorvastatin, buspirone, finasteride, Keppra b.i.d., Ativan b.i.d., Protonix b.i.d., Seroquel b.i.d., Xarelto , tamsulosin  Continue monitoring symptoms    Critical care note:  Critical care diagnosis:  Atrial fibrillation with RVR requiring IV Cardizem  Critical care interventions: Hands-on evaluation, review of labs/radiographs/records and discussion with patient and family if present  Critical care time spent: 45 minutes    VTE prophylaxis:  Xarelto    Patient condition:  Stable    Anticipated discharge and Disposition:     Pending    All diagnosis and differential diagnosis have been reviewed; assessment and plan has been documented; I have personally reviewed the labs and test results that are presently available; I have reviewed the patients medication list; I have reviewed the consulting providers response and recommendations. I have reviewed or attempted to review medical records based upon their availability    All of the patient's questions have been  addressed and answered. Patient's is agreeable to the above stated plan. I will continue to monitor closely and make adjustments to medical management as needed.    Portions of this note dictated using EMR integrated voice recognition software, and may be subject to voice recognition errors not corrected  at proofreading. Please contact writer for clarification if needed.   _____________________________________________________________________    Malnutrition Status:    Scheduled Med:   albuterol-ipratropium  3 mL Nebulization Q4H WAKE    atorvastatin  10 mg Per G Tube Daily    busPIRone  5 mg Per G Tube TID    diltiaZEM  60 mg Per G Tube Q6H    finasteride  5 mg Oral Daily    levetiracetam  1,500 mg Per G Tube BID    LORazepam  1 mg Per G Tube BID    methylPREDNISolone injection (PEDS and ADULTS)  80 mg Intravenous Daily    metoprolol tartrate  100 mg Per G Tube BID    mupirocin   Nasal BID    pantoprazole  40 mg Intravenous BID    piperacillin-tazobactam (Zosyn) IV (PEDS and ADULTS) (extended infusion is not appropriate)  4.5 g Intravenous Q8H    QUEtiapine  25 mg Per G Tube BID    rivaroxaban  20 mg Per G Tube with dinner    scopolamine  1 patch Transdermal Q3 Days    tamsulosin  0.4 mg Oral QHS      Continuous Infusions:   dilTIAZem  0-15 mg/hr Intravenous Continuous 10 mL/hr at 08/04/24 1132 10 mg/hr at 08/04/24 1132    lactated ringers   Intravenous Continuous   Paused at 08/04/24 0823      PRN Meds:    Current Facility-Administered Medications:     acetaminophen, 999.0148 mg, Oral, Q8H PRN    aluminum-magnesium hydroxide-simethicone, 30 mL, Oral, QID PRN    melatonin, 6 mg, Oral, Nightly PRN    naloxone, 0.02 mg, Intravenous, PRN    ondansetron, 4 mg, Intravenous, Q4H PRN    polyethylene glycol, 17 g, Oral, BID PRN    prochlorperazine, 5 mg, Intravenous, Q6H PRN    simethicone, 1 tablet, Oral, QID PRN     Radiology:  I have personally reviewed the following imaging and agree with the radiologist.     CT Abdomen Pelvis With IV Contrast NO Oral Contrast  Narrative: EXAMINATION:  CT ABDOMEN PELVIS WITH IV CONTRAST    CLINICAL HISTORY:  Abdominal pain, acute, nonlocalized;Nausea/vomiting;    TECHNIQUE:  Helical acquisition through the abdomen and pelvis with IV contrast.  Three plane reconstructions were provided  for review. DLP 1610 mGycm. Automatic exposure control, adjustment of mA/kV or iterative reconstruction technique was used to reduce radiation.    COMPARISON:  18 July 2024    FINDINGS:  Motion degraded scan.    The limited imaged lung bases are clear.    No acute findings liver, gallbladder, spleen, pancreas or adrenals.  No hydronephrosis.    There is prominent distal esophageal wall thickening.  A gastrostomy tube is in place.  No significant inflammatory changes of the small or large bowel.  Normal appendix.  No free air.    There is a Fernandez in the urinary bladder.  No pelvic free fluid.  Abdominal aorta normal in caliber.  Mild atherosclerotic disease.    There are no acute osseous findings.  Prominent heterotopic bone around the left hip.  Is  Impression: 1. Prominent distal esophageal wall thickening suggestive of esophagitis.  2. Otherwise no acute abdominopelvic findings.  Chronic findings above.  3. No significant discrepancy with the preliminary report.    Electronically signed by: Tani Calderon  Date:    08/04/2024  Time:    09:25      Pierce Monreal MD  Department of Hospital Medicine   [facility]   08/05/2024

## 2024-08-05 NOTE — PROCEDURES
"Delio Daniel Jr. is a 69 y.o. male patient.    Temp: 96.7 °F (35.9 °C) (08/05/24 1603)  Pulse: 90 (08/05/24 1603)  Resp: (!) 22 (08/05/24 1551)  BP: 125/81 (08/05/24 1603)  SpO2: 100 % (08/05/24 1603)  Weight: 117.9 kg (260 lb) (08/03/24 1955)  Height: 5' 10" (177.8 cm) (08/05/24 1358)    PICC  Time out: Immediately prior to procedure a time out was called to verify the correct patient, procedure, equipment, support staff and site/side marked as required  Indications: med administration  Preparation: skin prepped with ChloraPrep  Skin prep agent dried: skin prep agent completely dried prior to procedure  Sterile barriers: all five maximum sterile barriers used - cap, mask, sterile gown, sterile gloves, and large sterile sheet  Hand hygiene: hand hygiene performed prior to central venous catheter insertion  Location details: right brachial  Catheter type: single lumen  Catheter size: 4 Fr  Catheter Length: 14cm    Ultrasound guidance: yes  Needle advanced into vessel with real time Ultrasound guidance.  Guidewire confirmed in vessel.  Sterile sheath used.            Name donn  8/5/2024    "

## 2024-08-06 LAB
ALBUMIN SERPL-MCNC: 3.2 G/DL (ref 3.4–4.8)
ALBUMIN/GLOB SERPL: 0.9 RATIO (ref 1.1–2)
ALP SERPL-CCNC: 146 UNIT/L (ref 40–150)
ALT SERPL-CCNC: 22 UNIT/L (ref 0–55)
ANION GAP SERPL CALC-SCNC: 10 MEQ/L
AST SERPL-CCNC: 19 UNIT/L (ref 5–34)
BACTERIA SPEC CULT: NORMAL
BACTERIA UR CULT: ABNORMAL
BASOPHILS # BLD AUTO: 0 X10(3)/MCL
BASOPHILS NFR BLD AUTO: 0 %
BILIRUB SERPL-MCNC: 0.5 MG/DL
BUN SERPL-MCNC: 16.3 MG/DL (ref 8.4–25.7)
CALCIUM SERPL-MCNC: 8.5 MG/DL (ref 8.8–10)
CHLORIDE SERPL-SCNC: 108 MMOL/L (ref 98–107)
CO2 SERPL-SCNC: 25 MMOL/L (ref 23–31)
CREAT SERPL-MCNC: 0.72 MG/DL (ref 0.73–1.18)
CREAT/UREA NIT SERPL: 23
EOSINOPHIL # BLD AUTO: 0 X10(3)/MCL (ref 0–0.9)
EOSINOPHIL NFR BLD AUTO: 0 %
ERYTHROCYTE [DISTWIDTH] IN BLOOD BY AUTOMATED COUNT: 15.7 % (ref 11.5–17)
GFR SERPLBLD CREATININE-BSD FMLA CKD-EPI: >60 ML/MIN/1.73/M2
GLOBULIN SER-MCNC: 3.6 GM/DL (ref 2.4–3.5)
GLUCOSE SERPL-MCNC: 163 MG/DL (ref 82–115)
GRAM STN SPEC: NORMAL
HCT VFR BLD AUTO: 38.9 % (ref 42–52)
HGB BLD-MCNC: 12.1 G/DL (ref 14–18)
IMM GRANULOCYTES # BLD AUTO: 0.03 X10(3)/MCL (ref 0–0.04)
IMM GRANULOCYTES NFR BLD AUTO: 0.4 %
LACTATE SERPL-SCNC: 1.5 MMOL/L (ref 0.5–2.2)
LYMPHOCYTES # BLD AUTO: 0.93 X10(3)/MCL (ref 0.6–4.6)
LYMPHOCYTES NFR BLD AUTO: 12.6 %
MAGNESIUM SERPL-MCNC: 2.5 MG/DL (ref 1.6–2.6)
MCH RBC QN AUTO: 26.1 PG (ref 27–31)
MCHC RBC AUTO-ENTMCNC: 31.1 G/DL (ref 33–36)
MCV RBC AUTO: 84 FL (ref 80–94)
MONOCYTES # BLD AUTO: 0.23 X10(3)/MCL (ref 0.1–1.3)
MONOCYTES NFR BLD AUTO: 3.1 %
NEUTROPHILS # BLD AUTO: 6.22 X10(3)/MCL (ref 2.1–9.2)
NEUTROPHILS NFR BLD AUTO: 83.9 %
NRBC BLD AUTO-RTO: 0 %
PLATELET # BLD AUTO: 266 X10(3)/MCL (ref 130–400)
PLATELETS.RETICULATED NFR BLD AUTO: 3 % (ref 0.9–11.2)
PMV BLD AUTO: 10 FL (ref 7.4–10.4)
POTASSIUM SERPL-SCNC: 3.4 MMOL/L (ref 3.5–5.1)
PROT SERPL-MCNC: 6.8 GM/DL (ref 5.8–7.6)
RBC # BLD AUTO: 4.63 X10(6)/MCL (ref 4.7–6.1)
SODIUM SERPL-SCNC: 143 MMOL/L (ref 136–145)
WBC # BLD AUTO: 7.41 X10(3)/MCL (ref 4.5–11.5)

## 2024-08-06 PROCEDURE — 27200966 HC CLOSED SUCTION SYSTEM

## 2024-08-06 PROCEDURE — 63600175 PHARM REV CODE 636 W HCPCS: Performed by: STUDENT IN AN ORGANIZED HEALTH CARE EDUCATION/TRAINING PROGRAM

## 2024-08-06 PROCEDURE — 80053 COMPREHEN METABOLIC PANEL: CPT | Performed by: NURSE PRACTITIONER

## 2024-08-06 PROCEDURE — 99900035 HC TECH TIME PER 15 MIN (STAT)

## 2024-08-06 PROCEDURE — 25000003 PHARM REV CODE 250

## 2024-08-06 PROCEDURE — A4216 STERILE WATER/SALINE, 10 ML: HCPCS | Performed by: INTERNAL MEDICINE

## 2024-08-06 PROCEDURE — 99900026 HC AIRWAY MAINTENANCE (STAT)

## 2024-08-06 PROCEDURE — 25000003 PHARM REV CODE 250: Performed by: INTERNAL MEDICINE

## 2024-08-06 PROCEDURE — 85025 COMPLETE CBC W/AUTO DIFF WBC: CPT | Performed by: NURSE PRACTITIONER

## 2024-08-06 PROCEDURE — 25000242 PHARM REV CODE 250 ALT 637 W/ HCPCS: Performed by: INTERNAL MEDICINE

## 2024-08-06 PROCEDURE — 63600175 PHARM REV CODE 636 W HCPCS: Performed by: INTERNAL MEDICINE

## 2024-08-06 PROCEDURE — 94760 N-INVAS EAR/PLS OXIMETRY 1: CPT

## 2024-08-06 PROCEDURE — 36415 COLL VENOUS BLD VENIPUNCTURE: CPT | Performed by: NURSE PRACTITIONER

## 2024-08-06 PROCEDURE — 63600175 PHARM REV CODE 636 W HCPCS: Performed by: NURSE PRACTITIONER

## 2024-08-06 PROCEDURE — 36415 COLL VENOUS BLD VENIPUNCTURE: CPT | Performed by: STUDENT IN AN ORGANIZED HEALTH CARE EDUCATION/TRAINING PROGRAM

## 2024-08-06 PROCEDURE — 94640 AIRWAY INHALATION TREATMENT: CPT

## 2024-08-06 PROCEDURE — 83735 ASSAY OF MAGNESIUM: CPT | Performed by: NURSE PRACTITIONER

## 2024-08-06 PROCEDURE — 25000003 PHARM REV CODE 250: Performed by: STUDENT IN AN ORGANIZED HEALTH CARE EDUCATION/TRAINING PROGRAM

## 2024-08-06 PROCEDURE — 21400001 HC TELEMETRY ROOM

## 2024-08-06 PROCEDURE — 94761 N-INVAS EAR/PLS OXIMETRY MLT: CPT

## 2024-08-06 PROCEDURE — 83605 ASSAY OF LACTIC ACID: CPT | Performed by: STUDENT IN AN ORGANIZED HEALTH CARE EDUCATION/TRAINING PROGRAM

## 2024-08-06 RX ADMIN — SODIUM CHLORIDE, PRESERVATIVE FREE 10 ML: 5 INJECTION INTRAVENOUS at 11:08

## 2024-08-06 RX ADMIN — IPRATROPIUM BROMIDE AND ALBUTEROL SULFATE 3 ML: 2.5; .5 SOLUTION RESPIRATORY (INHALATION) at 11:08

## 2024-08-06 RX ADMIN — SODIUM CHLORIDE, PRESERVATIVE FREE 10 ML: 5 INJECTION INTRAVENOUS at 05:08

## 2024-08-06 RX ADMIN — PIPERACILLIN AND TAZOBACTAM 4.5 G: 4; .5 INJECTION, POWDER, LYOPHILIZED, FOR SOLUTION INTRAVENOUS; PARENTERAL at 01:08

## 2024-08-06 RX ADMIN — IPRATROPIUM BROMIDE AND ALBUTEROL SULFATE 3 ML: 2.5; .5 SOLUTION RESPIRATORY (INHALATION) at 07:08

## 2024-08-06 RX ADMIN — QUETIAPINE FUMARATE 25 MG: 25 TABLET ORAL at 08:08

## 2024-08-06 RX ADMIN — IPRATROPIUM BROMIDE AND ALBUTEROL SULFATE 3 ML: 2.5; .5 SOLUTION RESPIRATORY (INHALATION) at 04:08

## 2024-08-06 RX ADMIN — DILTIAZEM HYDROCHLORIDE 60 MG: 60 TABLET, FILM COATED ORAL at 12:08

## 2024-08-06 RX ADMIN — DILTIAZEM HYDROCHLORIDE 60 MG: 60 TABLET, FILM COATED ORAL at 10:08

## 2024-08-06 RX ADMIN — POTASSIUM BICARBONATE 25 MEQ: 977.5 TABLET, EFFERVESCENT ORAL at 10:08

## 2024-08-06 RX ADMIN — BUSPIRONE HYDROCHLORIDE 5 MG: 5 TABLET ORAL at 03:08

## 2024-08-06 RX ADMIN — DILTIAZEM HYDROCHLORIDE 60 MG: 60 TABLET, FILM COATED ORAL at 05:08

## 2024-08-06 RX ADMIN — ATORVASTATIN CALCIUM 10 MG: 10 TABLET, FILM COATED ORAL at 10:08

## 2024-08-06 RX ADMIN — CEFTRIAXONE SODIUM 2 G: 2 INJECTION, POWDER, FOR SOLUTION INTRAMUSCULAR; INTRAVENOUS at 11:08

## 2024-08-06 RX ADMIN — RIVAROXABAN 20 MG: 10 TABLET, FILM COATED ORAL at 05:08

## 2024-08-06 RX ADMIN — BUSPIRONE HYDROCHLORIDE 5 MG: 5 TABLET ORAL at 10:08

## 2024-08-06 RX ADMIN — BUSPIRONE HYDROCHLORIDE 5 MG: 5 TABLET ORAL at 08:08

## 2024-08-06 RX ADMIN — Medication: at 10:08

## 2024-08-06 RX ADMIN — LEVETIRACETAM 1500 MG: 100 SOLUTION ORAL at 08:08

## 2024-08-06 RX ADMIN — LORAZEPAM 1 MG: 1 TABLET ORAL at 08:08

## 2024-08-06 RX ADMIN — METOPROLOL TARTRATE 100 MG: 50 TABLET, FILM COATED ORAL at 08:08

## 2024-08-06 RX ADMIN — FINASTERIDE 5 MG: 5 TABLET, FILM COATED ORAL at 10:08

## 2024-08-06 RX ADMIN — TAMSULOSIN HYDROCHLORIDE 0.4 MG: 0.4 CAPSULE ORAL at 08:08

## 2024-08-06 RX ADMIN — DOXYCYCLINE 100 MG: 100 INJECTION, POWDER, LYOPHILIZED, FOR SOLUTION INTRAVENOUS at 01:08

## 2024-08-06 RX ADMIN — METHYLPREDNISOLONE SODIUM SUCCINATE 80 MG: 40 INJECTION, POWDER, FOR SOLUTION INTRAMUSCULAR; INTRAVENOUS at 11:08

## 2024-08-06 RX ADMIN — METOPROLOL TARTRATE 100 MG: 50 TABLET, FILM COATED ORAL at 10:08

## 2024-08-06 RX ADMIN — QUETIAPINE FUMARATE 25 MG: 25 TABLET ORAL at 10:08

## 2024-08-06 RX ADMIN — PANTOPRAZOLE SODIUM 40 MG: 40 INJECTION, POWDER, LYOPHILIZED, FOR SOLUTION INTRAVENOUS at 08:08

## 2024-08-06 RX ADMIN — SODIUM CHLORIDE, PRESERVATIVE FREE 10 ML: 5 INJECTION INTRAVENOUS at 12:08

## 2024-08-06 RX ADMIN — SODIUM CHLORIDE, POTASSIUM CHLORIDE, SODIUM LACTATE AND CALCIUM CHLORIDE: 600; 310; 30; 20 INJECTION, SOLUTION INTRAVENOUS at 03:08

## 2024-08-06 RX ADMIN — MUPIROCIN: 20 OINTMENT TOPICAL at 10:08

## 2024-08-06 RX ADMIN — LORAZEPAM 1 MG: 1 TABLET ORAL at 10:08

## 2024-08-06 RX ADMIN — LEVETIRACETAM 1500 MG: 100 SOLUTION ORAL at 10:08

## 2024-08-06 RX ADMIN — PANTOPRAZOLE SODIUM 40 MG: 40 INJECTION, POWDER, LYOPHILIZED, FOR SOLUTION INTRAVENOUS at 11:08

## 2024-08-06 RX ADMIN — MUPIROCIN: 20 OINTMENT TOPICAL at 08:08

## 2024-08-06 NOTE — NURSING
Nurses Note -- 4 Eyes      8/5/2024   11:30 PM      Skin assessed during: Q Shift Change      [] No Altered Skin Integrity Present    []Prevention Measures Documented      [x] Yes- Altered Skin Integrity Present or Discovered   [] LDA Added if Not in Epic (Describe Wound)   [] New Altered Skin Integrity was Present on Admit and Documented in LDA   [x] Wound Image Taken    Wound Care Consulted? Yes    Attending Nurse:  Vy Mendez. LPN    Second RN/Staff Member: MÓNICA Ortiz

## 2024-08-06 NOTE — PROGRESS NOTES
[facility] Telemetry    Cardiology  Progress Note    Patient Name: Delio Daniel Jr.  MRN: [id number]  Admission Date: 8/3/2024  Hospital Length of Stay: 2 days  Code Status: Full Code   Attending Physician: Pierce Monreal MD   Primary Care Physician: Jl Briones MD  Expected Discharge Date:   Principal Problem:<principal problem not specified>    Subjective:     Reason for Consult: AFIB     HPI: Mr. Daniel is a 70 y/o male with a history of AFIB on Xarelto, CVA, SSS,  HTN, HLD, CAD, who is known to [facility], Dr. Kimbrough. He presented to the ER on 8.3.24 from [facility] for vomiting. Nursing home reports he was vomiting from his Trach. EMS reports vomiting from his mouth. EMS reports that patient was in AFIB RVR. He was given 20mg IV Cardizem and placed on a cardizem gtt. Significant labs include H&H 11.8/36.1, CO2 22, Chloride 109, Glucose 121, Calcium 8.4, , Lactic Acid 2.7. Flu, RSV, and COVID negative.UA positive for bacteria. Urine cultures and blood cultures positive. CT abdomen shows prominent distal esophageal wall thickening suggestive of esophagitis. CXR shows Right infrahilar lower lung zone atelectasis and possible small left pleural effusion.  [facility] has been consulted for AFIB management.     Hospital Course:   8.5.24: NAD, VSS. He denies SOB, CP, or nausea, tele review showed controlled afib. Cardizem infusion off.   8.6.24: NAD, VSS. He denies SOB, CP, or nausea. Tele review shows controlled afib.     PMH: AFIB on Xarelto, CVA, SSS,  HTN, HLD, CAD, Arthritis, Dysphagia, Seizure, Liver Steatosis, Diverticulosis, BPH, Depression, Obesity   PSH: PEG, AICD, LHC, Trach, Craniectomy, EGD, Colonoscopy,   Family History: Mother - HTN; Sister - HTN; Brother - HTN  Social History: Denies smoking, illicit drug use, and alcohol use.      Previous Cardiac Diagnostics:   ECHO (1.15.24):  TDS. No Definity contrast available for use. Left Ventricle: The left  ventricle is normal in size. Moderately increased wall thickness. Normal wall motion. There is normal systolic function. Ejection fraction by visual approximation is 55%. Right Ventricle: Normal right ventricular cavity size. Systolic function is borderline low. Left Atrium: Left atrium is severely dilated. Right Atrium: Right atrium is mildly dilated. Mitral Valve: There is bileaflet sclerosis. There is mild regurgitation. IVC/SVC: Normal venous pressure at 3 mmHg.     Venous US LUE (12.26.23):  There was no evidence of deep vein thrombosis in the left upper extremity.   A superficial thrombosis was identified in the left cephalic vein.      Carotid US (12.19.23):  The right internal carotid artery demonstrated less than 50% stenosis.  The left internal carotid artery demonstrated less than 50% stenosis.  The bilateral vertebral arteries were patent with antegrade flow.  Bilateral internal carotid arteries demonstrated decreased velocities starting from the common carotid arteries.      LHC (5.25.18):   LM: Normal. Normal size and bifurcation. LAD: Abnormal. Large, mild atherosclerotic plaque, moderately large diagonals. Mid LAD 30% stenosis. The lesion was tubular and eccentric. LCX: abnormal and Large, mild atherosclerotic plaque, large OM with mild narrowing.OM 1 35% stenosis. RCA: normal. Large and no significant disease.        Review of Systems   Reason unable to perform ROS: acuity of condition.       Objective:     Vital Signs (Most Recent):  Temp: 97.6 °F (36.4 °C) (08/06/24 1114)  Pulse: 80 (08/06/24 1145)  Resp: 20 (08/06/24 1145)  BP: 134/88 (08/06/24 1114)  SpO2: 98 % (08/06/24 1145) Vital Signs (24h Range):  Temp:  [96.7 °F (35.9 °C)-99.7 °F (37.6 °C)] 97.6 °F (36.4 °C)  Pulse:  [] 80  Resp:  [14-22] 20  SpO2:  [91 %-100 %] 98 %  BP: (121-149)/(73-94) 134/88   Weight: 117.9 kg (260 lb)  Body mass index is 37.31 kg/m².  SpO2: 98 %       Intake/Output Summary (Last 24 hours) at 8/6/2024 1346  Last  "data filed at 8/6/2024 0623  Gross per 24 hour   Intake 0 ml   Output 800 ml   Net -800 ml     Lines/Drains/Airways       Drain  Duration                  Gastrostomy/Enterostomy 01/02/24 1230  days              Airway  Duration             Adult Surgical Airway 12/29/23 1229 Shiley 6.0/ 75mm 221 days              Peripheral Intravenous Line  Duration                  Midline Catheter - Single Lumen 08/05/24 1631 Right brachial vein <1 day                    Significant Labs:   Chemistries:   Recent Labs   Lab 08/03/24  2141 08/04/24  0629 08/05/24  0501 08/06/24  0401    143 142 143   K 3.4* 3.5 3.4* 3.4*    109* 107 108*   CO2 23 22* 26 25   BUN 11.7 11.2 11.1 16.3   CREATININE 0.72* 0.76 0.73 0.72*   CALCIUM 8.9 8.4* 8.6* 8.5*   BILITOT 0.6 1.0 0.8 0.5   ALKPHOS 175* 168* 147 146   ALT 23 21 20 22   AST 23 22 19 19   GLUCOSE 111 121* 144* 163*   MG 2.10 2.00 2.30 2.50   PHOS  --  3.4 4.6  --    TROPONINI 0.016 <0.010  --   --         CBC/Anemia Labs: Coags:    Recent Labs   Lab 08/04/24  0629 08/05/24  0501 08/06/24  0401   WBC 11.06 4.78 7.41   HGB 11.8* 10.9* 12.1*   HCT 36.1* 37.5* 38.9*    249 266   MCV 80.2 90.6 84.0   RDW 15.8 15.9 15.7    No results for input(s): "PT", "INR", "APTT" in the last 168 hours.     Telemetry:  Afib CVR    Physical Exam  Constitutional:       Appearance: He is ill-appearing.   HENT:      Head: Normocephalic.      Mouth/Throat:      Mouth: Mucous membranes are dry.   Neck:      Comments: + Trach   Cardiovascular:      Rate and Rhythm: Normal rate. Rhythm irregular.      Pulses: Normal pulses.      Heart sounds: Normal heart sounds.   Pulmonary:      Effort: Pulmonary effort is normal. No respiratory distress.      Breath sounds: Normal breath sounds.   Abdominal:      General: Abdomen is flat.      Comments: + PEG         Current Schedule Inpatient Medications:   albuterol-ipratropium  3 mL Nebulization Q4H WAKE    atorvastatin  10 mg Per G Tube Daily    " busPIRone  5 mg Per G Tube TID    cefTRIAXone (Rocephin) IV (PEDS and ADULTS)  2 g Intravenous Q24H    diltiaZEM  60 mg Per G Tube Q6H    doxycycline IV (PEDS and ADULTS)  100 mg Intravenous Q12H    finasteride  5 mg Oral Daily    levetiracetam  1,500 mg Per G Tube BID    LORazepam  1 mg Per G Tube BID    methylPREDNISolone injection (PEDS and ADULTS)  80 mg Intravenous Daily    metoprolol tartrate  100 mg Per G Tube BID    mupirocin   Nasal BID    pantoprazole  40 mg Intravenous BID    QUEtiapine  25 mg Per G Tube BID    rivaroxaban  20 mg Per G Tube with dinner    scopolamine  1 patch Transdermal Q3 Days    sodium chloride 0.9%  10 mL Intravenous Q6H    tamsulosin  0.4 mg Oral QHS    zinc oxide-cod liver oil   Topical (Top) BID     Continuous Infusions:   lactated ringers   Intravenous Continuous   Paused at 08/04/24 0823       Assessment:   Persistent AFIB - Currently CVR    - [id number] Score 4 (4.8% Stroke Risk per Year)    - on Xarelto  Aspiration PNA ?  Lactic Acidosis  Hypokalemia - Resolved   Esophagitis   Anemia   History of CVA    - s/p Peg and Trach   Small Left Pleural Effusion   SSS  HTN  HLD  CAD    - LHC (5.25.18): LM: Normal. Normal size and bifurcation. LAD: Abnormal. Large, mild atherosclerotic plaque, moderately large diagonals. Mid LAD 30% stenosis. The lesion was tubular and eccentric. LCX: abnormal and Large, mild atherosclerotic plaque, large OM with mild narrowing.OM 1 35% stenosis. RCA: normal. Large and no significant disease.      Arthritis  Dysphagia    - s/p PEG   Seizure  Liver Steatosis  Diverticulosis  BPH  Depression  Obesity   No known history of GI Bleed       Plan:   Cont Oral Cardizem 60mg QID and Metoprolol 100 mg oral BID  Cont. Xarelto for Stroke Risk Reduction   IV Antibiotics per Primary Team   Keep Mag > 2 and Potassium > 4   Cardiology to sign off, please reconsult if needed          Eleonora Kim NP  Cardiology  [facility] - [address] [place] [address]

## 2024-08-06 NOTE — NURSING
Nurses Note -- 4 Eyes      8/6/2024   8:05 AM      Skin assessed during: Q Shift Change      [] No Altered Skin Integrity Present    []Prevention Measures Documented      [x] Yes- Altered Skin Integrity Present or Discovered   [] LDA Added if Not in Epic (Describe Wound)   [] New Altered Skin Integrity was Present on Admit and Documented in LDA   [] Wound Image Taken    Wound Care Consulted? Yes    Attending Nurse:  Viktoria FAN    Second RN/Staff Member:   Vy FAN

## 2024-08-06 NOTE — PROGRESS NOTES
[facility]  Hospital Medicine Progress Note        Chief Complaint: Inpatient Follow-up    HPI:   69 y.o. 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 The patient presented to [facility] on 8/3/2024 with a primary complaint of vomiting from his trach.     While in route EMS reported patient vomiting from his mouth. He went into AFib RVR in which she was given 20 mg of Cardizem, 4 mg of Zofran and a 250 mL bolus prior to arrival to the ED. upon presentation to the ED temperature 98.8° F, reaching a max of 100.5, temperature 110, blood pressure 143/90, respiratory rate 25 and SpO2 100% on room air.  Labs with H&H 13.4/42, potassium 3.4, alkaline phosphatase 175, troponin 0.016, lactic acid 2.4.  Influenza A/B, RSV and SARS-COV-2 PCR negative.  UA with trace mucus, no squamous epithelial cells, trace bacteria, 11-20 WBCs, greater than 100 RBCs, 25 leukocyte esterase, 2+ blood and 2+ protein.  EKG atrial fibrillation with rapid ventricular rate of 118.  Chest x-ray with right infra hilar lower lung zone atelectasis and possible small left pleural effusion.  CT abdomen pelvis prominent distal esophageal wall thickening suggestive of esophagitis but no otherwise acute findings.  In ED patient received DuoNebs, Cardizem, Reglan, Zofran, Protonix and Zosyn.  Patient was admitted to hospital medicine services for further medical management.  Continued on IV Zosyn, IV Solu-Medrol, DuoNebs.  Blood cultures negative.  Urine culture showing GNRs.  Blood cultures remain negative.  Cardiology on board for AF/RVR; follow recommendations.    Interval Hx:   Today, patient stated he was doing well and had no new complaints.  Saturating well on 6 L O2, will wean as  tolerated.  IV Cardizem discontinued on 08/05 with well controlled HR.  Urine culture growing Klebsiella sensitive to Rocephin.    Case was discussed with patient's nurse on the floor.    Objective/physical exam:  General: alert male lying comfortably in bed, in no acute distress  HENT: oral and oropharyngeal mucosa moist, pink, with no erythema or exudates, no ear pain or discharge  Neck:  Tracheostomy in place; normal neck movement, no lymph nodes or swellings, no JVD or Carotid bruit  Respiratory: clear breathing sounds bilaterally, no crackles, rales, ronchi or wheezes  Cardiovascular: clear S1 and S2, no murmurs, rubs or gallops  Peripheral Vascular: no lesions, ulcers or erosions, normal peripheral pulses and no pedal edema  Gastrointestinal:  PEG in place; soft, non-tender, non-distended abdomen, no guarding, rigidity or rebound tenderness, normal bowel sounds  Integumentary: normal skin color, no rashes or lesions  Neuro: AAO x 3; motor strength 5/5 in B/L UEs & LEs; sensation intact to gross and fine touch B/L; CN II-XII grossly intact    VITAL SIGNS: 24 HRS MIN & MAX LAST   Temp  Min: 96.7 °F (35.9 °C)  Max: 99.7 °F (37.6 °C) 98 °F (36.7 °C)   BP  Min: 121/73  Max: 149/76 138/82   Pulse  Min: 77  Max: 113  93   Resp  Min: 18  Max: 22 20   SpO2  Min: 91 %  Max: 100 % 100 %     I have reviewed the following labs:  Recent Labs   Lab 08/04/24  0629 08/05/24  0501 08/06/24  0401   WBC 11.06 4.78 7.41   RBC 4.50* 4.14* 4.63*   HGB 11.8* 10.9* 12.1*   HCT 36.1* 37.5* 38.9*   MCV 80.2 90.6 84.0   MCH 26.2* 26.3* 26.1*   MCHC 32.7* 29.1* 31.1*   RDW 15.8 15.9 15.7    249 266   MPV 9.8 9.8 10.0     Recent Labs   Lab 08/04/24  0629 08/04/24  1345 08/05/24  0501 08/06/24  0401     --  142 143   K 3.5  --  3.4* 3.4*   *  --  107 108*   CO2 22*  --  26 25   BUN 11.2  --  11.1 16.3   CREATININE 0.76  --  0.73 0.72*   CALCIUM 8.4*  --  8.6* 8.5*   PH  --  7.660*  --   --    MG 2.00  --  2.30 2.50    ALBUMIN 3.4  --  3.1* 3.2*   ALKPHOS 168*  --  147 146   ALT 21  --  20 22   AST 22  --  19 19   BILITOT 1.0  --  0.8 0.5     Assessment/Plan:  Acute Hypoxemic respiratory failure 2/2 bronchitis versus pneumonia  Sepsis 2/2 above  Reflux esophagitis   Atrial Fibrillation with RVR   Normocytic anemia   HTN   HLD   CAD   AF on Xarelto   CVA with left-sided deficits, tracheostomy, peg   Hepatic steatosis   Diverticulosis   BPH   Hypokalemia    Continues to be admitted   Reporting no new complaints  Saturating well on 6 L O2; will wean as tolerated  Will switch IV Zosyn to IV Rocephin; added IV Doxycycline  Blood cultures negative  Urine culture growing Klebsiella  Cardiology on board; follow recommendations   IV Cardizem infusion stopped on 08/05; HR well controlled   Continued on diltiazem 60 mg q.6, metoprolol tartrate 100 mg b.i.d.  Continued on IV Solu-Medrol 80 mg daily, DuoNebs q.4   On IV LR 75 mL/hour   On TF via PEG  On atorvastatin, buspirone, finasteride, Keppra b.i.d., Ativan b.i.d., Protonix b.i.d., Seroquel b.i.d., Xarelto , tamsulosin  Continue monitoring symptoms    VTE prophylaxis:  Xarelto    Patient condition:  Stable    Anticipated discharge and Disposition:     Pending    All diagnosis and differential diagnosis have been reviewed; assessment and plan has been documented; I have personally reviewed the labs and test results that are presently available; I have reviewed the patients medication list; I have reviewed the consulting providers response and recommendations. I have reviewed or attempted to review medical records based upon their availability    All of the patient's questions have been  addressed and answered. Patient's is agreeable to the above stated plan. I will continue to monitor closely and make adjustments to medical management as needed.    Portions of this note dictated using EMR integrated voice recognition software, and may be subject to voice recognition errors not corrected  at proofreading. Please contact writer for clarification if needed.   _____________________________________________________________________    Malnutrition Status:    Scheduled Med:   albuterol-ipratropium  3 mL Nebulization Q4H WAKE    atorvastatin  10 mg Per G Tube Daily    busPIRone  5 mg Per G Tube TID    diltiaZEM  60 mg Per G Tube Q6H    finasteride  5 mg Oral Daily    levetiracetam  1,500 mg Per G Tube BID    LORazepam  1 mg Per G Tube BID    methylPREDNISolone injection (PEDS and ADULTS)  80 mg Intravenous Daily    metoprolol tartrate  100 mg Per G Tube BID    mupirocin   Nasal BID    pantoprazole  40 mg Intravenous BID    piperacillin-tazobactam (Zosyn) IV (PEDS and ADULTS) (extended infusion is not appropriate)  4.5 g Intravenous Q8H    QUEtiapine  25 mg Per G Tube BID    rivaroxaban  20 mg Per G Tube with dinner    scopolamine  1 patch Transdermal Q3 Days    sodium chloride 0.9%  10 mL Intravenous Q6H    tamsulosin  0.4 mg Oral QHS    zinc oxide-cod liver oil   Topical (Top) BID      Continuous Infusions:   lactated ringers   Intravenous Continuous   Paused at 08/04/24 0823      PRN Meds:    Current Facility-Administered Medications:     acetaminophen, 999.0148 mg, Oral, Q8H PRN    aluminum-magnesium hydroxide-simethicone, 30 mL, Oral, QID PRN    melatonin, 6 mg, Oral, Nightly PRN    naloxone, 0.02 mg, Intravenous, PRN    ondansetron, 4 mg, Intravenous, Q4H PRN    polyethylene glycol, 17 g, Oral, BID PRN    prochlorperazine, 5 mg, Intravenous, Q6H PRN    simethicone, 1 tablet, Oral, QID PRN    Flushing PICC/Midline Protocol, , , Until Discontinued **AND** sodium chloride 0.9%, 10 mL, Intravenous, Q6H **AND** sodium chloride 0.9%, 10 mL, Intravenous, PRN     Radiology:  I have personally reviewed the following imaging and agree with the radiologist.     CT Abdomen Pelvis With IV Contrast NO Oral Contrast  Narrative: EXAMINATION:  CT ABDOMEN PELVIS WITH IV CONTRAST    CLINICAL HISTORY:  Abdominal pain,  acute, nonlocalized;Nausea/vomiting;    TECHNIQUE:  Helical acquisition through the abdomen and pelvis with IV contrast.  Three plane reconstructions were provided for review. DLP 1610 mGycm. Automatic exposure control, adjustment of mA/kV or iterative reconstruction technique was used to reduce radiation.    COMPARISON:  18 July 2024    FINDINGS:  Motion degraded scan.    The limited imaged lung bases are clear.    No acute findings liver, gallbladder, spleen, pancreas or adrenals.  No hydronephrosis.    There is prominent distal esophageal wall thickening.  A gastrostomy tube is in place.  No significant inflammatory changes of the small or large bowel.  Normal appendix.  No free air.    There is a Fernandez in the urinary bladder.  No pelvic free fluid.  Abdominal aorta normal in caliber.  Mild atherosclerotic disease.    There are no acute osseous findings.  Prominent heterotopic bone around the left hip.  Is  Impression: 1. Prominent distal esophageal wall thickening suggestive of esophagitis.  2. Otherwise no acute abdominopelvic findings.  Chronic findings above.  3. No significant discrepancy with the preliminary report.    Electronically signed by: Tani Calderon  Date:    08/04/2024  Time:    09:25      Pierce Monreal MD  Department of Hospital Medicine   [facility]   08/06/2024

## 2024-08-06 NOTE — PLAN OF CARE
Problem: Adult Inpatient Plan of Care  Goal: Plan of Care Review  Outcome: Progressing  Goal: Patient-Specific Goal (Individualized)  Outcome: Progressing  Goal: Absence of Hospital-Acquired Illness or Injury  Outcome: Progressing  Goal: Optimal Comfort and Wellbeing  Outcome: Progressing  Goal: Readiness for Transition of Care  Outcome: Progressing     Problem: Wound  Goal: Optimal Coping  Outcome: Progressing  Goal: Optimal Functional Ability  Outcome: Progressing  Goal: Absence of Infection Signs and Symptoms  Outcome: Progressing  Goal: Improved Oral Intake  Outcome: Progressing  Goal: Optimal Pain Control and Function  Outcome: Progressing  Goal: Skin Health and Integrity  Outcome: Progressing  Goal: Optimal Wound Healing  Outcome: Progressing     Problem: Sepsis/Septic Shock  Goal: Optimal Coping  Outcome: Progressing  Goal: Absence of Bleeding  Outcome: Progressing  Goal: Blood Glucose Level Within Targeted Range  Outcome: Progressing  Goal: Absence of Infection Signs and Symptoms  Outcome: Progressing  Goal: Optimal Nutrition Intake  Outcome: Progressing     Problem: Skin Injury Risk Increased  Goal: Skin Health and Integrity  Outcome: Progressing     Problem: Infection  Goal: Absence of Infection Signs and Symptoms  Outcome: Progressing

## 2024-08-07 LAB
ALBUMIN SERPL-MCNC: 3.4 G/DL (ref 3.4–4.8)
ALBUMIN/GLOB SERPL: 0.9 RATIO (ref 1.1–2)
ALP SERPL-CCNC: 142 UNIT/L (ref 40–150)
ALT SERPL-CCNC: 25 UNIT/L (ref 0–55)
ANION GAP SERPL CALC-SCNC: 15 MEQ/L
AST SERPL-CCNC: 40 UNIT/L (ref 5–34)
BASOPHILS # BLD AUTO: 0.01 X10(3)/MCL
BASOPHILS NFR BLD AUTO: 0.1 %
BILIRUB SERPL-MCNC: 0.5 MG/DL
BUN SERPL-MCNC: 19 MG/DL (ref 8.4–25.7)
CALCIUM SERPL-MCNC: 8.6 MG/DL (ref 8.8–10)
CHLORIDE SERPL-SCNC: 106 MMOL/L (ref 98–107)
CO2 SERPL-SCNC: 21 MMOL/L (ref 23–31)
CREAT SERPL-MCNC: 0.77 MG/DL (ref 0.73–1.18)
CREAT/UREA NIT SERPL: 25
EOSINOPHIL # BLD AUTO: 0.01 X10(3)/MCL (ref 0–0.9)
EOSINOPHIL NFR BLD AUTO: 0.1 %
ERYTHROCYTE [DISTWIDTH] IN BLOOD BY AUTOMATED COUNT: 15.6 % (ref 11.5–17)
GFR SERPLBLD CREATININE-BSD FMLA CKD-EPI: >60 ML/MIN/1.73/M2
GLOBULIN SER-MCNC: 4 GM/DL (ref 2.4–3.5)
GLUCOSE SERPL-MCNC: 113 MG/DL (ref 82–115)
HCT VFR BLD AUTO: 40.5 % (ref 42–52)
HGB BLD-MCNC: 12.5 G/DL (ref 14–18)
IMM GRANULOCYTES # BLD AUTO: 0.17 X10(3)/MCL (ref 0–0.04)
IMM GRANULOCYTES NFR BLD AUTO: 2 %
LYMPHOCYTES # BLD AUTO: 1.5 X10(3)/MCL (ref 0.6–4.6)
LYMPHOCYTES NFR BLD AUTO: 17.9 %
MAGNESIUM SERPL-MCNC: 2.5 MG/DL (ref 1.6–2.6)
MCH RBC QN AUTO: 26.2 PG (ref 27–31)
MCHC RBC AUTO-ENTMCNC: 30.9 G/DL (ref 33–36)
MCV RBC AUTO: 84.7 FL (ref 80–94)
MONOCYTES # BLD AUTO: 0.97 X10(3)/MCL (ref 0.1–1.3)
MONOCYTES NFR BLD AUTO: 11.6 %
NEUTROPHILS # BLD AUTO: 5.73 X10(3)/MCL (ref 2.1–9.2)
NEUTROPHILS NFR BLD AUTO: 68.3 %
NRBC BLD AUTO-RTO: 0 %
PLATELET # BLD AUTO: 248 X10(3)/MCL (ref 130–400)
PLATELETS.RETICULATED NFR BLD AUTO: 2.6 % (ref 0.9–11.2)
PMV BLD AUTO: 10.1 FL (ref 7.4–10.4)
POTASSIUM SERPL-SCNC: 4.6 MMOL/L (ref 3.5–5.1)
PROT SERPL-MCNC: 7.4 GM/DL (ref 5.8–7.6)
RBC # BLD AUTO: 4.78 X10(6)/MCL (ref 4.7–6.1)
SODIUM SERPL-SCNC: 142 MMOL/L (ref 136–145)
WBC # BLD AUTO: 8.39 X10(3)/MCL (ref 4.5–11.5)

## 2024-08-07 PROCEDURE — A4216 STERILE WATER/SALINE, 10 ML: HCPCS | Performed by: INTERNAL MEDICINE

## 2024-08-07 PROCEDURE — 21400001 HC TELEMETRY ROOM

## 2024-08-07 PROCEDURE — 25000003 PHARM REV CODE 250

## 2024-08-07 PROCEDURE — 25000003 PHARM REV CODE 250: Performed by: INTERNAL MEDICINE

## 2024-08-07 PROCEDURE — 63600175 PHARM REV CODE 636 W HCPCS: Performed by: NURSE PRACTITIONER

## 2024-08-07 PROCEDURE — 36415 COLL VENOUS BLD VENIPUNCTURE: CPT | Performed by: STUDENT IN AN ORGANIZED HEALTH CARE EDUCATION/TRAINING PROGRAM

## 2024-08-07 PROCEDURE — 99900035 HC TECH TIME PER 15 MIN (STAT)

## 2024-08-07 PROCEDURE — 80053 COMPREHEN METABOLIC PANEL: CPT | Performed by: STUDENT IN AN ORGANIZED HEALTH CARE EDUCATION/TRAINING PROGRAM

## 2024-08-07 PROCEDURE — 25000003 PHARM REV CODE 250: Performed by: STUDENT IN AN ORGANIZED HEALTH CARE EDUCATION/TRAINING PROGRAM

## 2024-08-07 PROCEDURE — 94761 N-INVAS EAR/PLS OXIMETRY MLT: CPT

## 2024-08-07 PROCEDURE — 63600175 PHARM REV CODE 636 W HCPCS: Performed by: INTERNAL MEDICINE

## 2024-08-07 PROCEDURE — 27200966 HC CLOSED SUCTION SYSTEM

## 2024-08-07 PROCEDURE — 85025 COMPLETE CBC W/AUTO DIFF WBC: CPT | Performed by: STUDENT IN AN ORGANIZED HEALTH CARE EDUCATION/TRAINING PROGRAM

## 2024-08-07 PROCEDURE — 83735 ASSAY OF MAGNESIUM: CPT | Performed by: STUDENT IN AN ORGANIZED HEALTH CARE EDUCATION/TRAINING PROGRAM

## 2024-08-07 PROCEDURE — 25000242 PHARM REV CODE 250 ALT 637 W/ HCPCS: Performed by: INTERNAL MEDICINE

## 2024-08-07 PROCEDURE — 63600175 PHARM REV CODE 636 W HCPCS: Performed by: STUDENT IN AN ORGANIZED HEALTH CARE EDUCATION/TRAINING PROGRAM

## 2024-08-07 PROCEDURE — 99900031 HC PATIENT EDUCATION (STAT)

## 2024-08-07 PROCEDURE — 99900026 HC AIRWAY MAINTENANCE (STAT)

## 2024-08-07 PROCEDURE — 94640 AIRWAY INHALATION TREATMENT: CPT

## 2024-08-07 RX ADMIN — DOXYCYCLINE 100 MG: 100 INJECTION, POWDER, LYOPHILIZED, FOR SOLUTION INTRAVENOUS at 01:08

## 2024-08-07 RX ADMIN — LORAZEPAM 1 MG: 1 TABLET ORAL at 10:08

## 2024-08-07 RX ADMIN — DILTIAZEM HYDROCHLORIDE 60 MG: 60 TABLET, FILM COATED ORAL at 07:08

## 2024-08-07 RX ADMIN — BUSPIRONE HYDROCHLORIDE 5 MG: 5 TABLET ORAL at 09:08

## 2024-08-07 RX ADMIN — MUPIROCIN: 20 OINTMENT TOPICAL at 10:08

## 2024-08-07 RX ADMIN — QUETIAPINE FUMARATE 25 MG: 25 TABLET ORAL at 09:08

## 2024-08-07 RX ADMIN — SCOPOLAMINE 1 PATCH: 1 PATCH TRANSDERMAL at 10:08

## 2024-08-07 RX ADMIN — METHYLPREDNISOLONE SODIUM SUCCINATE 80 MG: 40 INJECTION, POWDER, FOR SOLUTION INTRAMUSCULAR; INTRAVENOUS at 09:08

## 2024-08-07 RX ADMIN — CEFTRIAXONE SODIUM 2 G: 2 INJECTION, POWDER, FOR SOLUTION INTRAMUSCULAR; INTRAVENOUS at 10:08

## 2024-08-07 RX ADMIN — DILTIAZEM HYDROCHLORIDE 60 MG: 60 TABLET, FILM COATED ORAL at 01:08

## 2024-08-07 RX ADMIN — SODIUM CHLORIDE, PRESERVATIVE FREE 10 ML: 5 INJECTION INTRAVENOUS at 03:08

## 2024-08-07 RX ADMIN — METOPROLOL TARTRATE 100 MG: 50 TABLET, FILM COATED ORAL at 10:08

## 2024-08-07 RX ADMIN — FINASTERIDE 5 MG: 5 TABLET, FILM COATED ORAL at 09:08

## 2024-08-07 RX ADMIN — BUSPIRONE HYDROCHLORIDE 5 MG: 5 TABLET ORAL at 10:08

## 2024-08-07 RX ADMIN — IPRATROPIUM BROMIDE AND ALBUTEROL SULFATE 3 ML: 2.5; .5 SOLUTION RESPIRATORY (INHALATION) at 07:08

## 2024-08-07 RX ADMIN — TAMSULOSIN HYDROCHLORIDE 0.4 MG: 0.4 CAPSULE ORAL at 10:08

## 2024-08-07 RX ADMIN — ATORVASTATIN CALCIUM 10 MG: 10 TABLET, FILM COATED ORAL at 09:08

## 2024-08-07 RX ADMIN — RIVAROXABAN 20 MG: 10 TABLET, FILM COATED ORAL at 04:08

## 2024-08-07 RX ADMIN — Medication: at 10:08

## 2024-08-07 RX ADMIN — QUETIAPINE FUMARATE 25 MG: 25 TABLET ORAL at 10:08

## 2024-08-07 RX ADMIN — DOXYCYCLINE 100 MG: 100 INJECTION, POWDER, LYOPHILIZED, FOR SOLUTION INTRAVENOUS at 12:08

## 2024-08-07 RX ADMIN — IPRATROPIUM BROMIDE AND ALBUTEROL SULFATE 3 ML: 2.5; .5 SOLUTION RESPIRATORY (INHALATION) at 04:08

## 2024-08-07 RX ADMIN — SODIUM CHLORIDE, PRESERVATIVE FREE 10 ML: 5 INJECTION INTRAVENOUS at 10:08

## 2024-08-07 RX ADMIN — LEVETIRACETAM 1500 MG: 100 SOLUTION ORAL at 09:08

## 2024-08-07 RX ADMIN — PANTOPRAZOLE SODIUM 40 MG: 40 INJECTION, POWDER, LYOPHILIZED, FOR SOLUTION INTRAVENOUS at 09:08

## 2024-08-07 RX ADMIN — LORAZEPAM 1 MG: 1 TABLET ORAL at 09:08

## 2024-08-07 RX ADMIN — DILTIAZEM HYDROCHLORIDE 60 MG: 60 TABLET, FILM COATED ORAL at 09:08

## 2024-08-07 RX ADMIN — IPRATROPIUM BROMIDE AND ALBUTEROL SULFATE 3 ML: 2.5; .5 SOLUTION RESPIRATORY (INHALATION) at 11:08

## 2024-08-07 RX ADMIN — LEVETIRACETAM 1500 MG: 100 SOLUTION ORAL at 10:08

## 2024-08-07 RX ADMIN — SODIUM CHLORIDE, POTASSIUM CHLORIDE, SODIUM LACTATE AND CALCIUM CHLORIDE: 600; 310; 30; 20 INJECTION, SOLUTION INTRAVENOUS at 04:08

## 2024-08-07 RX ADMIN — METOPROLOL TARTRATE 100 MG: 50 TABLET, FILM COATED ORAL at 09:08

## 2024-08-07 RX ADMIN — BUSPIRONE HYDROCHLORIDE 5 MG: 5 TABLET ORAL at 04:08

## 2024-08-07 RX ADMIN — IPRATROPIUM BROMIDE AND ALBUTEROL SULFATE 3 ML: 2.5; .5 SOLUTION RESPIRATORY (INHALATION) at 08:08

## 2024-08-07 RX ADMIN — DILTIAZEM HYDROCHLORIDE 60 MG: 60 TABLET, FILM COATED ORAL at 12:08

## 2024-08-07 NOTE — NURSING
Nurses Note -- 4 Eyes      8/7/2024   3:55 AM      Skin assessed during: Q Shift Change      [] No Altered Skin Integrity Present    []Prevention Measures Documented      [x] Yes- Altered Skin Integrity Present or Discovered   [] LDA Added if Not in Epic (Describe Wound)   [] New Altered Skin Integrity was Present on Admit and Documented in LDA   [] Wound Image Taken    Wound Care Consulted? Yes    Attending Nurse:  MÓNICA Yip     Second RN/Staff Member:  MÓNICA Blum

## 2024-08-07 NOTE — PROGRESS NOTES
[facility]  Hospital Medicine Progress Note        Chief Complaint: Inpatient Follow-up    HPI:   69 y.o. 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 The patient presented to [facility] on 8/3/2024 with a primary complaint of vomiting from his trach.     While in route EMS reported patient vomiting from his mouth. He went into AFib RVR in which she was given 20 mg of Cardizem, 4 mg of Zofran and a 250 mL bolus prior to arrival to the ED. upon presentation to the ED temperature 98.8° F, reaching a max of 100.5, temperature 110, blood pressure 143/90, respiratory rate 25 and SpO2 100% on room air.  Labs with H&H 13.4/42, potassium 3.4, alkaline phosphatase 175, troponin 0.016, lactic acid 2.4.  Influenza A/B, RSV and SARS-COV-2 PCR negative.  UA with trace mucus, no squamous epithelial cells, trace bacteria, 11-20 WBCs, greater than 100 RBCs, 25 leukocyte esterase, 2+ blood and 2+ protein.  EKG atrial fibrillation with rapid ventricular rate of 118.  Chest x-ray with right infra hilar lower lung zone atelectasis and possible small left pleural effusion.  CT abdomen pelvis prominent distal esophageal wall thickening suggestive of esophagitis but no otherwise acute findings.  In ED patient received DuoNebs, Cardizem, Reglan, Zofran, Protonix and Zosyn.  Patient was admitted to hospital medicine services for further medical management.  Continued on IV Zosyn, IV Solu-Medrol, DuoNebs.  Blood cultures negative.  Urine culture showing GNRs.  Blood cultures remain negative.  Cardiology on board for AF/RVR; follow recommendations. IV Cardizem discontinued on 08/05 with well controlled HR. CIS signed off on 08/06.  Patient continues to have episodes of bringing up  feeds given through PEG even at low rates of 20-30 mL/hour.  GI consulted for reflux esophagitis leading to patient not being able to tolerate tube feeds via PEG. Urine culture growing Klebsiella sensitive to Rocephin.    Interval Hx:   Today, patient stated he was doing well and had no new complaints.  Saturating well on RA via tracheostomy.  Will continue following GI recommendations.  Will up-titrate TF as tolerated.  Replacing K.    Case was discussed with patient's nurse on the floor.    Objective/physical exam:  General: alert male lying comfortably in bed, in no acute distress  HENT: oral and oropharyngeal mucosa moist, pink, with no erythema or exudates, no ear pain or discharge  Neck:  Tracheostomy in place; normal neck movement, no lymph nodes or swellings, no JVD or Carotid bruit  Respiratory: clear breathing sounds bilaterally, no crackles, rales, ronchi or wheezes  Cardiovascular: clear S1 and S2, no murmurs, rubs or gallops  Peripheral Vascular: no lesions, ulcers or erosions, normal peripheral pulses and no pedal edema  Gastrointestinal:  PEG in place; soft, non-tender, non-distended abdomen, no guarding, rigidity or rebound tenderness, normal bowel sounds  Integumentary: normal skin color, no rashes or lesions  Neuro: AAO x 3; motor strength 5/5 in B/L UEs & LEs; sensation intact to gross and fine touch B/L; CN II-XII grossly intact    VITAL SIGNS: 24 HRS MIN & MAX LAST   Temp  Min: 97.6 °F (36.4 °C)  Max: 98.6 °F (37 °C) 98.6 °F (37 °C)   BP  Min: 134/88  Max: 157/100 137/68   Pulse  Min: 80  Max: 104  104   Resp  Min: 12  Max: 23 16   SpO2  Min: 92 %  Max: 100 % 97 %     I have reviewed the following labs:  Recent Labs   Lab 08/04/24  0629 08/05/24  0501 08/06/24  0401   WBC 11.06 4.78 7.41   RBC 4.50* 4.14* 4.63*   HGB 11.8* 10.9* 12.1*   HCT 36.1* 37.5* 38.9*   MCV 80.2 90.6 84.0   MCH 26.2* 26.3* 26.1*   MCHC 32.7* 29.1* 31.1*   RDW 15.8 15.9 15.7    249 266   MPV 9.8 9.8 10.0     Recent  Labs   Lab 08/04/24  0629 08/04/24  1345 08/05/24  0501 08/06/24  0401     --  142 143   K 3.5  --  3.4* 3.4*   *  --  107 108*   CO2 22*  --  26 25   BUN 11.2  --  11.1 16.3   CREATININE 0.76  --  0.73 0.72*   CALCIUM 8.4*  --  8.6* 8.5*   PH  --  7.660*  --   --    MG 2.00  --  2.30 2.50   ALBUMIN 3.4  --  3.1* 3.2*   ALKPHOS 168*  --  147 146   ALT 21  --  20 22   AST 22  --  19 19   BILITOT 1.0  --  0.8 0.5     Assessment/Plan:  Acute Hypoxemic respiratory failure 2/2 bronchitis versus pneumonia  Sepsis 2/2 above  Reflux esophagitis   Atrial Fibrillation with RVR   Normocytic anemia   HTN   HLD   CAD   AF on Xarelto   CVA with left-sided deficits, tracheostomy, peg   Hepatic steatosis   Diverticulosis   BPH   Hypokalemia    Continues to be admitted   Reporting no new complaints  Saturating well on RA  On IV Rocephin/Doxycycline  Blood cultures negative  Urine culture growing Klebsiella  Cardiology on board; follow recommendations   IV Cardizem infusion stopped on 08/05; HR well controlled   GI consulted; will follow recommendations  Continued on diltiazem 60 mg q.6, metoprolol tartrate 100 mg b.i.d.  Continued on IV Solu-Medrol 40 mg daily, DuoNebs q.4   On IV LR 75 mL/hour   On TF via PEG; up titrate as needed  On atorvastatin, buspirone, finasteride, Keppra b.i.d., Ativan b.i.d., Protonix b.i.d., Seroquel b.i.d., Xarelto , tamsulosin  Continue monitoring symptoms    VTE prophylaxis:  Xarelto    Patient condition:  Stable    Anticipated discharge and Disposition:     Pending    All diagnosis and differential diagnosis have been reviewed; assessment and plan has been documented; I have personally reviewed the labs and test results that are presently available; I have reviewed the patients medication list; I have reviewed the consulting providers response and recommendations. I have reviewed or attempted to review medical records based upon their availability    All of the patient's questions have  been  addressed and answered. Patient's is agreeable to the above stated plan. I will continue to monitor closely and make adjustments to medical management as needed.    Portions of this note dictated using EMR integrated voice recognition software, and may be subject to voice recognition errors not corrected at proofreading. Please contact writer for clarification if needed.   _____________________________________________________________________    Malnutrition Status:    Scheduled Med:   albuterol-ipratropium  3 mL Nebulization Q4H WAKE    atorvastatin  10 mg Per G Tube Daily    busPIRone  5 mg Per G Tube TID    cefTRIAXone (Rocephin) IV (PEDS and ADULTS)  2 g Intravenous Q24H    diltiaZEM  60 mg Per G Tube Q6H    doxycycline IV (PEDS and ADULTS)  100 mg Intravenous Q12H    finasteride  5 mg Oral Daily    levetiracetam  1,500 mg Per G Tube BID    LORazepam  1 mg Per G Tube BID    methylPREDNISolone injection (PEDS and ADULTS)  80 mg Intravenous Daily    metoprolol tartrate  100 mg Per G Tube BID    mupirocin   Nasal BID    pantoprazole  40 mg Intravenous BID    QUEtiapine  25 mg Per G Tube BID    rivaroxaban  20 mg Per G Tube with dinner    scopolamine  1 patch Transdermal Q3 Days    sodium chloride 0.9%  10 mL Intravenous Q6H    tamsulosin  0.4 mg Oral QHS    zinc oxide-cod liver oil   Topical (Top) BID      Continuous Infusions:   lactated ringers   Intravenous Continuous 75 mL/hr at 08/06/24 1557 New Bag at 08/06/24 1557      PRN Meds:    Current Facility-Administered Medications:     acetaminophen, 999.0148 mg, Oral, Q8H PRN    aluminum-magnesium hydroxide-simethicone, 30 mL, Oral, QID PRN    melatonin, 6 mg, Oral, Nightly PRN    naloxone, 0.02 mg, Intravenous, PRN    ondansetron, 4 mg, Intravenous, Q4H PRN    polyethylene glycol, 17 g, Oral, BID PRN    prochlorperazine, 5 mg, Intravenous, Q6H PRN    simethicone, 1 tablet, Oral, QID PRN    Flushing PICC/Midline Protocol, , , Until Discontinued **AND** sodium  chloride 0.9%, 10 mL, Intravenous, Q6H **AND** sodium chloride 0.9%, 10 mL, Intravenous, PRN     Radiology:  I have personally reviewed the following imaging and agree with the radiologist.     X-Ray Chest 1 View  Narrative: EXAMINATION:  XR CHEST 1 VIEW    CPT 36532    CLINICAL HISTORY:  Sob;    COMPARISON:  August 3, 2024    FINDINGS:  Examination reveals cardiomediastinal silhouette and pleuroparenchymal changes are essentially unchanged as compared with the previous exam  Impression: No significant change    Electronically signed by: Vik Keen  Date:    08/06/2024  Time:    14:06      Pierce Monreal MD  Department of Hospital Medicine   [facility]   08/07/2024

## 2024-08-07 NOTE — CONSULTS
Consult Note    Reason for Consult:      We were consulted by Dr. Monreal to evaluate this patient for reflux esophagitis, unable to tolerate tube feeds.     HPI:   68 y.o. male known to Dr. Escalona from inpatient care (primary GI is Dr. 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  Patient went into AFib in route, given 20 mg Cardizem.  CTA/P noted prominent distal esophageal wall thickening suggestive of esophagitis.  Found to have tracheobronchitis infectious versus inflammatory from aspiration sepsis, acute hypoxic respiratory failure.  Started on dexamethasone IV, Zosyn, Cardizem drip.  Cardiology on board for AFib with RVR.  IV Cardizem discontinued 8/5 with well-controlled HR.  Urine culture growing Klebsiella.  Blood cultures negative. GI consulted today d/t patient not tolerating TFs.    Patient seen during last admission for the same complaint. TFs were held for 2 days and patient was eventually able to tolerate TFs again.    History obtained via chart review and nurse report due to patient being nonverbal.  Per nurse, patient vomited last night and it looked like tube feeds.  Therefore she held tube feeds overnight and restarted them this morning at 20 cc/hour without issue so far.  Has not attempted med to get.  Having bowel movements. No residuals per nurse.    Previous records reviewed...  01/02/2024 EGD/PEG: relatively unremarkable exam, and a 24 FR peg was placed with external bumper at 4 cm.    PCP:  Jl Briones MD    Review of patient's allergies indicates:  No Known Allergies     Current Facility-Administered Medications   Medication Dose Route Frequency Provider  Last Rate Last Admin    acetaminophen oral solution 999.0148 mg  999.0148 mg Oral Q8H PRN Sekou Contreras MD   999.0148 mg at 08/04/24 0643    albuterol-ipratropium 2.5 mg-0.5 mg/3 mL nebulizer solution 3 mL  3 mL Nebulization Q4H WAKE Juancarlos Mota MD   3 mL at 08/07/24 0756    aluminum-magnesium hydroxide-simethicone 200-200-20 mg/5 mL suspension 30 mL  30 mL Oral QID PRN Tania Butts AGACNP-BC        atorvastatin tablet 10 mg  10 mg Per G Tube Daily Juancarlos Mota MD   10 mg at 08/06/24 1014    busPIRone tablet 5 mg  5 mg Per G Tube TID Juancarlos Mota MD   5 mg at 08/06/24 2039    cefTRIAXone (ROCEPHIN) 2 g in D5W 100 mL IVPB (MB+)  2 g Intravenous Q24H Pierce Monreal MD   Stopped at 08/06/24 1221    diltiaZEM tablet 60 mg  60 mg Per G Tube Q6H Juancarlos Mota MD   60 mg at 08/07/24 0052    doxycycline 100 mg in D5W 100 mL IVPB (MB+)  100 mg Intravenous Q12H Pierce Monreal MD   Stopped at 08/07/24 0152    finasteride tablet 5 mg  5 mg Oral Daily Juancarlos Mota MD   5 mg at 08/06/24 1014    lactated ringers infusion   Intravenous Continuous Tania Butts AGACNP-BC 75 mL/hr at 08/06/24 1557 New Bag at 08/06/24 1557    levetiracetam 500 mg/5 mL (5 mL) liquid Soln 1,500 mg  1,500 mg Per G Tube BID Juancarlos Mota MD   1,500 mg at 08/06/24 2039    LORazepam tablet 1 mg  1 mg Per G Tube BID Juancarlos Mota MD   1 mg at 08/06/24 2039    melatonin tablet 6 mg  6 mg Oral Nightly PRN Tania Butts AGACNP-BC        methylPREDNISolone sodium succinate injection 80 mg  80 mg Intravenous Daily Juancarlos Mota MD   80 mg at 08/06/24 1101    metoprolol tartrate (LOPRESSOR) tablet 100 mg  100 mg Per G Tube BID Annalisa Erickson FNP   100 mg at 08/06/24 2039    mupirocin 2 % ointment   Nasal BID Sekou Contreras MD   Given at 08/06/24 2039    naloxone 0.4 mg/mL injection 0.02 mg  0.02 mg Intravenous PRN Tania Butts AGACNP-BC        ondansetron injection 4 mg  4 mg Intravenous Q4H PRN Tania Butts AGACNP-BC         pantoprazole injection 40 mg  40 mg Intravenous BID Juancarlos Mota MD   40 mg at 08/06/24 2039    polyethylene glycol packet 17 g  17 g Oral BID PRN Tania Butts AGACNP-BC        prochlorperazine injection Soln 5 mg  5 mg Intravenous Q6H PRN Tania Butts AGACNP-BC        QUEtiapine tablet 25 mg  25 mg Per G Tube BID Juancarlos Mota MD   25 mg at 08/06/24 2039    rivaroxaban tablet 20 mg  20 mg Per G Tube with dinner Juancarlos Mota MD   20 mg at 08/06/24 1756    scopolamine 1.3-1.5 mg (1 mg over 3 days) 1 patch  1 patch Transdermal Q3 Days Juancarlos Mota MD   1 patch at 08/04/24 1054    simethicone chewable tablet 80 mg  1 tablet Oral QID PRN Tania Butts AGACNP-BC        sodium chloride 0.9% flush 10 mL  10 mL Intravenous Q6H Sekou Contreras MD   10 mL at 08/06/24 1756    And    sodium chloride 0.9% flush 10 mL  10 mL Intravenous PRN Sekou Contreras MD        tamsulosin 24 hr capsule 0.4 mg  0.4 mg Oral QHS Juancarlos Mota MD   0.4 mg at 08/06/24 2039    zinc oxide-cod liver oil 40 % paste   Topical (Top) BID Sekou Contreras MD   Given at 08/06/24 1012     Medications Prior to Admission   Medication Sig Dispense Refill Last Dose    ascorbic Acid (VITAMIN C) 500 mg CpSR 500 mg 2 (two) times daily. Per PEG tube   8/3/2024    busPIRone (BUSPAR) 5 MG Tab 5 mg by Per G Tube route 3 (three) times daily.   8/3/2024    cholestyramine (QUESTRAN) 4 gram packet 4 g once daily. Via PEG tube   8/3/2024    cholestyramine-aspartame (QUESTRAN LIGHT) 4 gram PwPk 1 packet (4 g total) by Per G Tube route 2 (two) times daily. 180 packet 3 8/3/2024    diltiaZEM (CARDIZEM) 60 MG tablet 60 mg by Per G Tube route every 6 (six) hours.   8/3/2024    famotidine (PEPCID) 20 MG tablet 1 tablet (20 mg total) by Per G Tube route 2 (two) times daily. 60 tablet 11 Past Week    finasteride (PROSCAR) 5 mg tablet Take 1 tablet (5 mg total) by mouth once daily. 30 tablet 11 8/3/2024    furosemide (LASIX) 80 MG tablet  80 mg by Per G Tube route as needed (for Edema).   Past Week    hydrALAZINE (APRESOLINE) 50 MG tablet 75 mg by Per G Tube route every 8 (eight) hours as needed (for elevated BP).   Past Week    L. acidophilus/L.bulgaricus (FLORANEX ORAL) 1 packet by PEG Tube route Daily.   8/3/2024    levetiracetam 500 mg/5 mL (5 mL) Soln 1,500 mg by Per G Tube route 2 (two) times daily. Nursing home reports medication hold by MD until level redraw on Monday.   Past Week    LIPITOR 10 mg tablet 10 mg by Per G Tube route once daily.   8/3/2024    losartan (COZAAR) 100 MG tablet 100 mg by Per G Tube route once daily.   8/3/2024    metoprolol tartrate (LOPRESSOR) 100 MG tablet 100 mg by Per G Tube route 2 (two) times daily.   8/3/2024    multivitamin (THERAGRAN) per tablet 1 tablet by Per G Tube route once daily.   8/3/2024    protein supplement (PROMOD PROTEIN ORAL) 30 mLs by Per G Tube route once daily.   8/3/2024    QUEtiapine (SEROQUEL) 25 MG Tab 25 mg by Per G Tube route 2 (two) times daily.   8/3/2024    scopolamine (TRANSDERM-SCOP) 1.3-1.5 mg (1 mg over 3 days) Place 1 patch onto the skin Every 3 (three) days.   8/3/2024    tamsulosin (FLOMAX) 0.4 mg Cap Take 1 capsule (0.4 mg total) by mouth every evening. 30 capsule 11 8/3/2024    venlafaxine 75 mg TR24 1 tablet by Per G Tube route 2 (two) times a day.   8/3/2024    cloNIDine (CATAPRES) 0.1 MG tablet 0.1 mg by Per G Tube route every 8 (eight) hours as needed (Give for systolic >180).   More than a month    XARELTO 20 mg Tab 1 tablet by Per G Tube route nightly.   8/2/2024       Past Medical History:  Past Medical History:   Diagnosis Date    Arthritis     Atrial fibrillation     BPH (benign prostatic hyperplasia)     Cardiac arrest     Coronary artery disease     Cyst, kidney, acquired     Diverticulosis     Hyperlipidemia     Hypertension     MI (myocardial infarction)     Obesity     Steatosis of liver     Stroke       Past Surgical History:  Past Surgical History:    Procedure Laterality Date    A-V CARDIAC PACEMAKER INSERTION Right     CARDIAC CATHETERIZATION      COLONOSCOPY W/ BIOPSIES      CRANIECTOMY Right 12/20/2023    Procedure: CRANIECTOMY;  Surgeon: Artem Can MD;  Location: [facility] OR;  Service: Neurosurgery;  Laterality: Right;    ESOPHAGOGASTRODUODENOSCOPY W/ PEG N/A 1/2/2024    Procedure: PEG;  Surgeon: Tani Day MD;  Location: [facility] ENDOSCOPY;  Service: Gastroenterology;  Laterality: N/A;    excision of colon      TRACHEOSTOMY N/A 12/29/2023    Procedure: CREATION, TRACHEOSTOMY;  Surgeon: Patricia Winslow MD;  Location: [facility] OR;  Service: ENT;  Laterality: N/A;  REQ 1130 //  NEEDS 2 SCRUBS      Family History:  Family History   Problem Relation Name Age of Onset    Hypertension Mother      Hypertension Father      Hypertension Sister       Social History:  Social History     Tobacco Use    Smoking status: Never    Smokeless tobacco: Never   Substance Use Topics    Alcohol use: Not Currently       Review of Systems:     Review of Systems   Unable to perform ROS: Patient nonverbal       Objective:     VITAL SIGNS: 24 HR MIN & MAX LAST    Temp  Min: 97.6 °F (36.4 °C)  Max: 98.6 °F (37 °C)  98.6 °F (37 °C)        BP  Min: 134/88  Max: 157/100  137/68     Pulse  Min: 80  Max: 104  104     Resp  Min: 12  Max: 23  16    SpO2  Min: 92 %  Max: 100 %  97 %        Intake/Output Summary (Last 24 hours) at 8/7/2024 0819  Last data filed at 8/7/2024 0642  Gross per 24 hour   Intake --   Output 1035 ml   Net -1035 ml       Physical Exam  Constitutional:       General: He is not in acute distress.     Appearance: He is ill-appearing.      Comments: Sleeping but opens eyes to tactile stimuli. HOB elevated but patient is slumped over.   HENT:      Head: Normocephalic and atraumatic.      Mouth/Throat:      Comments: trach  Eyes:      General: No scleral icterus.     Extraocular Movements: Extraocular movements intact.   Cardiovascular:      Rate and Rhythm: Normal  rate and regular rhythm.   Pulmonary:      Effort: No respiratory distress.   Abdominal:      General: Bowel sounds are normal. There is no distension.      Palpations: Abdomen is soft. There is no mass.      Tenderness: There is no abdominal tenderness. There is no guarding or rebound.      Comments: PEG tube in place with TFs going at 20 cc/hr   Skin:     General: Skin is warm and dry.      Coloration: Skin is not jaundiced.   Neurological:      Mental Status: Mental status is at baseline.   Psychiatric:      Comments: Unable to assess           Recent Results (from the past 48 hour(s))   Magnesium    Collection Time: 08/06/24  4:01 AM   Result Value Ref Range    Magnesium Level 2.50 1.60 - 2.60 mg/dL   Comprehensive Metabolic Panel    Collection Time: 08/06/24  4:01 AM   Result Value Ref Range    Sodium 143 136 - 145 mmol/L    Potassium 3.4 (L) 3.5 - 5.1 mmol/L    Chloride 108 (H) 98 - 107 mmol/L    CO2 25 23 - 31 mmol/L    Glucose 163 (H) 82 - 115 mg/dL    Blood Urea Nitrogen 16.3 8.4 - 25.7 mg/dL    Creatinine 0.72 (L) 0.73 - 1.18 mg/dL    Calcium 8.5 (L) 8.8 - 10.0 mg/dL    Protein Total 6.8 5.8 - 7.6 gm/dL    Albumin 3.2 (L) 3.4 - 4.8 g/dL    Globulin 3.6 (H) 2.4 - 3.5 gm/dL    Albumin/Globulin Ratio 0.9 (L) 1.1 - 2.0 ratio    Bilirubin Total 0.5 <=1.5 mg/dL     40 - 150 unit/L    ALT 22 0 - 55 unit/L    AST 19 5 - 34 unit/L    eGFR >60 mL/min/1.73/m2    Anion Gap 10.0 mEq/L    BUN/Creatinine Ratio 23    CBC with Differential    Collection Time: 08/06/24  4:01 AM   Result Value Ref Range    WBC 7.41 4.50 - 11.50 x10(3)/mcL    RBC 4.63 (L) 4.70 - 6.10 x10(6)/mcL    Hgb 12.1 (L) 14.0 - 18.0 g/dL    Hct 38.9 (L) 42.0 - 52.0 %    MCV 84.0 80.0 - 94.0 fL    MCH 26.1 (L) 27.0 - 31.0 pg    MCHC 31.1 (L) 33.0 - 36.0 g/dL    RDW 15.7 11.5 - 17.0 %    Platelet 266 130 - 400 x10(3)/mcL    MPV 10.0 7.4 - 10.4 fL    Neut % 83.9 %    Lymph % 12.6 %    Mono % 3.1 %    Eos % 0.0 %    Basophil % 0.0 %    Lymph # 0.93  0.6 - 4.6 x10(3)/mcL    Neut # 6.22 2.1 - 9.2 x10(3)/mcL    Mono # 0.23 0.1 - 1.3 x10(3)/mcL    Eos # 0.00 0 - 0.9 x10(3)/mcL    Baso # 0.00 <=0.2 x10(3)/mcL    IG# 0.03 0 - 0.04 x10(3)/mcL    IG% 0.4 %    NRBC% 0.0 %    IPF 3.0 0.9 - 11.2 %   Lactic Acid, Plasma    Collection Time: 08/06/24 10:17 AM   Result Value Ref Range    Lactic Acid Level 1.5 0.5 - 2.2 mmol/L       X-Ray Chest 1 View    Result Date: 8/6/2024  EXAMINATION: XR CHEST 1 VIEW CPT 63820 CLINICAL HISTORY: Sob; COMPARISON: August 3, 2024 FINDINGS: Examination reveals cardiomediastinal silhouette and pleuroparenchymal changes are essentially unchanged as compared with the previous exam     No significant change Electronically signed by: Vik Keen Date:    08/06/2024 Time:    14:06    CT Abdomen Pelvis With IV Contrast NO Oral Contrast    Result Date: 8/4/2024  EXAMINATION: CT ABDOMEN PELVIS WITH IV CONTRAST CLINICAL HISTORY: Abdominal pain, acute, nonlocalized;Nausea/vomiting; TECHNIQUE: Helical acquisition through the abdomen and pelvis with IV contrast.  Three plane reconstructions were provided for review. DLP 1610 mGycm. Automatic exposure control, adjustment of mA/kV or iterative reconstruction technique was used to reduce radiation. COMPARISON: 18 July 2024 FINDINGS: Motion degraded scan. The limited imaged lung bases are clear. No acute findings liver, gallbladder, spleen, pancreas or adrenals.  No hydronephrosis. There is prominent distal esophageal wall thickening.  A gastrostomy tube is in place.  No significant inflammatory changes of the small or large bowel.  Normal appendix.  No free air. There is a Fernandez in the urinary bladder.  No pelvic free fluid.  Abdominal aorta normal in caliber.  Mild atherosclerotic disease. There are no acute osseous findings.  Prominent heterotopic bone around the left hip.  Is     1. Prominent distal esophageal wall thickening suggestive of esophagitis. 2. Otherwise no acute abdominopelvic findings.   Chronic findings above. 3. No significant discrepancy with the preliminary report. Electronically signed by: Tani Calderon Date:    08/04/2024 Time:    09:25    X-Ray Chest AP Portable    Result Date: 8/3/2024  EXAMINATION: XR CHEST AP PORTABLE CLINICAL HISTORY: Vomiting, unspecified TECHNIQUE: One view COMPARISON: July 21, 2024. FINDINGS: Cardiopericardial silhouette appearance is similar.  Cardiac device electrodes are in similar location.  Left upper chest is obscured by the mandible.  No overt edema or consolidation.  There are right infrahilar lower lung lobe atelectatic changes.  Possible small left pleural effusion.     Right infrahilar lower lung zone atelectasis and possible small left pleural effusion. Electronically signed by: Sami Taylor Date:    08/03/2024 Time:    22:09    CT Head Without Contrast    Result Date: 7/23/2024  EXAMINATION: CT HEAD WITHOUT CONTRAST CLINICAL HISTORY: Implant planning; TECHNIQUE: CT imaging of the head and neck performed according to a planning protocol.  DLP 1255 mGycm. Automatic exposure control, adjustment of mA/kV or iterative reconstruction technique was used to reduce radiation. COMPARISON: 18 July 2024 FINDINGS: Perhaps slightly increased herniation of the brain through the craniectomy defect.  No new parenchymal attenuation abnormality.  No acute hemorrhage seen.  Little if any midline shift.  Similar ventricular enlargement.  There are vascular calcifications.     CT for surgical planning.  Perhaps slightly increased herniation of the brain through the craniectomy defect. Electronically signed by: Tani Calderon Date:    07/23/2024 Time:    17:16    XR Gastric tube check, non-radiologist performed    Result Date: 7/21/2024  EXAMINATION: XR GASTRIC TUBE CHECK, NON-RADIOLOGIST PERFORMED CLINICAL HISTORY: not tolerating tube feeds via PEG, resistance when attempting to tighten external bumper; TECHNIQUE: One view COMPARISON: January 20, 2024 FINDINGS: Contrast instilled  through the gastrostomy tube partially opacifies the stomach.  There is no contrast extravasation.     Gastrostomy tube terminates within the stomach. Electronically signed by: Sami Taylor Date:    07/21/2024 Time:    15:39    X-Ray Chest 1 View    Result Date: 7/21/2024  EXAMINATION: XR CHEST 1 VIEW CLINICAL HISTORY: sob; TECHNIQUE: Single frontal view of the chest was performed. COMPARISON: 07/18/2024 FINDINGS: LINES AND TUBES: Tracheostomy cannula projects over the trachea with tip at the level of the clavicular heads.  Dual lead cardiac pacer device is in place via right subclavian approach with leads overlying the right atrium and right ventricle. MEDIASTINUM AND MARJORIE: Cardiac silhouette is enlarged.  Aortic atherosclerosis. LUNGS: No lobar consolidation. No edema. PLEURA:No pleural effusion. No pneumothorax. OTHER: No acute osseous abnormality.     Enlarged cardiac silhouette without edema Electronically signed by: Laureen Christina Date:    07/21/2024 Time:    12:08    CT Chest Abdomen Pelvis With IV Contrast (XPD) NO Oral Contrast    Result Date: 7/19/2024  EXAMINATION: CT CHEST ABDOMEN PELVIS WITH IV CONTRAST (XPD) CLINICAL HISTORY: absent bowel sounds/Vomiting; TECHNIQUE: Low dose axial images, sagittal and coronal reformations were obtained from the thoracic inlet to the pubic symphysis following the IV contrast administration. Automatic exposure control is utilized to reduce patient radiation exposure. COMPARISON: None FINDINGS: The lungs are adequately aerated.  No pneumothorax is seen.  No pulmonary contusion is seen.  No pleural effusion is seen.  No infiltrate is seen. The thoracic aorta is normal in caliber.  No dissection or aneurysm is seen.  No retrosternal hematoma is seen. The abdominal aorta appears grossly unremarkable.  No dissection or posttraumatic changes are seen. The heart appears normal. The esophagus is fluid-filled and dilated. There is a gastrostomy tube in the stomach. The liver  appears normal.  No liver mass or lesion is seen.  Portal and hepatic veins appear normal. The gallbladder appears normal.  No gallstones are seen.. The spleen appears normal.  No splenic mass is seen..  The pancreas appears grossly unremarkable.  No pancreatic mass or lesion is seen.  No inflammation is seen. No adrenal abnormality is seen.  No adrenal nodule is seen. The kidneys are well perfused.  There is some cysts seen in both kidneys.  No hydronephrosis is seen.  No hydroureter is seen.  No retroperitoneal hematoma is seen. Visualized portions of the bowel shows no acute abnormality.  No colitis is seen.  No diverticulitis is seen.  No colonic mass is seen. No free air is seen.  No free fluid is seen. The urinary bladder is decompressed due to Fernandez catheter but there are some inflammatory changes and wall thickening seen in the urinary bladder concerning for cystitis.. Dysplastic changes seen in the left hip with significant amount heterotrophic bone formation seen.  These findings appear to be chronic.     Dilated fluid-filled esophagus.  Findings are consistent with gastroesophageal reflux Urinary bladder wall thickening and inflammatory changes seen concerning for cystitis Electronically signed by: Erick Grant Date:    07/19/2024 Time:    10:59    CT Head Without Contrast    Result Date: 7/19/2024  Technique: CT of the head was performed without intravenous contrast with direct axial as well as coronal and sagittal reconstruction images. Comparison: Comparison is with study dated 2024-05-15 10:08:14. Clinical history: Mental status change, unknown cause; vomiting with hx of ventriculomnegaly. FINDINGS: There is no significant change in the large area of encephalomalacia involving the right cerebrum. There is likewise no apparent change in the degree of outward protrusion of the cerebral parenchyma through the wide right sided craniotomy defect. The degree of dilatation of the lateral ventricles remains  stable. There are no new hypodensities to suggest an acute infarct. No acute intracranial hemorrhage is seen. CSF spaces: Subarachnoid space: Within normal limits. Brain parenchyma: There is no acute large vessel territory infarct. Calvarium: Post operative wide craniotomy in the right is again noted. Maxillofacial Structures: Paranasal sinuses: The visualized paranasal sinuses appear clear with no significant mucoperiosteal thickening or air fluid levels identified.     Impression: 1. There is no significant change in the large area of encephalomalacia involving the right cerebrum. There is likewise no apparent change in the degree of outward protrusion of the cerebral parenchyma through the wide right sided craniotomy defect. The degree of dilatation of the lateral ventricles remains stable. There are no new hypodensities to suggest an acute infarct. No acute intracranial hemorrhage is seen. 2. Details and findings as noted above. No significant discrepancy with overnight report. Electronically signed by: Sami Taylor Date:    07/19/2024 Time:    07:26    X-Ray Chest AP Portable    Result Date: 7/18/2024  EXAMINATION: XR CHEST AP PORTABLE CLINICAL HISTORY: Gastric contents in respiratory tract, part unspecified causing other injury, initial encounter COMPARISON: 15 May 2024 FINDINGS: Frontal view of the chest was obtained. Tracheostomy remains.  There is right-sided AICD.  Stable cardiomegaly.  Lungs are grossly clear.  No pneumothorax.     No acute findings. Electronically signed by: Tani Calderon Date:    07/18/2024 Time:    16:42      Imaging personally reviewed by myself and SP.    Assessment / Plan:     68 y.o. male known to Dr. Escalona from inpatient care (primary GI is Dr. 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  Patient went into AFib en route, given 20 mg Cardizem.  CT A/P noted prominent distal esophageal wall thickening suggestive of esophagitis. Admitted with tracheobronchitis infectious versus inflammatory from aspiration sepsis, acute hypoxic respiratory failure, and Afib with RVR.  Started on dexamethasone IV, Zosyn, Cardizem drip. IV Cardizem discontinued 8/5 with well-controlled HR.  Urine culture growing Klebsiella.  Blood cultures negative. GI consulted today d/t patient not tolerating TFs.     Reflux and vomiting of tube feeds    - Pt's HOB is elevated, but patient is slumped over in a way that would allow for aspiration. Nurse states they try to keep him upright but he slumps over when they leave. Patient will likely continue to have reflux of TFs in this position  - TFs started back today at 20 cc/hr and patient seems to be tolerating. Increase by 10 cc at a time to goal of 75 cc/hr. If residuals >100 cc, hold TFs  - Continue PPI    Thank you for allowing us to participate in this patient's care.

## 2024-08-08 LAB
BACTERIA BLD CULT: NORMAL
BACTERIA BLD CULT: NORMAL

## 2024-08-08 PROCEDURE — 63600175 PHARM REV CODE 636 W HCPCS: Performed by: STUDENT IN AN ORGANIZED HEALTH CARE EDUCATION/TRAINING PROGRAM

## 2024-08-08 PROCEDURE — 21400001 HC TELEMETRY ROOM

## 2024-08-08 PROCEDURE — 94640 AIRWAY INHALATION TREATMENT: CPT

## 2024-08-08 PROCEDURE — 25000003 PHARM REV CODE 250: Performed by: INTERNAL MEDICINE

## 2024-08-08 PROCEDURE — 99900026 HC AIRWAY MAINTENANCE (STAT)

## 2024-08-08 PROCEDURE — 63600175 PHARM REV CODE 636 W HCPCS: Performed by: INTERNAL MEDICINE

## 2024-08-08 PROCEDURE — 25000003 PHARM REV CODE 250: Performed by: STUDENT IN AN ORGANIZED HEALTH CARE EDUCATION/TRAINING PROGRAM

## 2024-08-08 PROCEDURE — 99900035 HC TECH TIME PER 15 MIN (STAT)

## 2024-08-08 PROCEDURE — 63600175 PHARM REV CODE 636 W HCPCS: Performed by: NURSE PRACTITIONER

## 2024-08-08 PROCEDURE — 25000003 PHARM REV CODE 250

## 2024-08-08 PROCEDURE — 94761 N-INVAS EAR/PLS OXIMETRY MLT: CPT

## 2024-08-08 PROCEDURE — A4216 STERILE WATER/SALINE, 10 ML: HCPCS | Performed by: INTERNAL MEDICINE

## 2024-08-08 PROCEDURE — 25000242 PHARM REV CODE 250 ALT 637 W/ HCPCS: Performed by: INTERNAL MEDICINE

## 2024-08-08 RX ADMIN — ATORVASTATIN CALCIUM 10 MG: 10 TABLET, FILM COATED ORAL at 09:08

## 2024-08-08 RX ADMIN — SODIUM CHLORIDE, PRESERVATIVE FREE 10 ML: 5 INJECTION INTRAVENOUS at 06:08

## 2024-08-08 RX ADMIN — MUPIROCIN: 20 OINTMENT TOPICAL at 08:08

## 2024-08-08 RX ADMIN — BUSPIRONE HYDROCHLORIDE 5 MG: 5 TABLET ORAL at 04:08

## 2024-08-08 RX ADMIN — RIVAROXABAN 20 MG: 10 TABLET, FILM COATED ORAL at 06:08

## 2024-08-08 RX ADMIN — Medication: at 08:08

## 2024-08-08 RX ADMIN — METOPROLOL TARTRATE 100 MG: 50 TABLET, FILM COATED ORAL at 09:08

## 2024-08-08 RX ADMIN — DILTIAZEM HYDROCHLORIDE 60 MG: 60 TABLET, FILM COATED ORAL at 12:08

## 2024-08-08 RX ADMIN — BUSPIRONE HYDROCHLORIDE 5 MG: 5 TABLET ORAL at 08:08

## 2024-08-08 RX ADMIN — TAMSULOSIN HYDROCHLORIDE 0.4 MG: 0.4 CAPSULE ORAL at 08:08

## 2024-08-08 RX ADMIN — METHYLPREDNISOLONE SODIUM SUCCINATE 40 MG: 40 INJECTION, POWDER, FOR SOLUTION INTRAMUSCULAR; INTRAVENOUS at 10:08

## 2024-08-08 RX ADMIN — PANTOPRAZOLE SODIUM 40 MG: 40 INJECTION, POWDER, LYOPHILIZED, FOR SOLUTION INTRAVENOUS at 09:08

## 2024-08-08 RX ADMIN — FINASTERIDE 5 MG: 5 TABLET, FILM COATED ORAL at 09:08

## 2024-08-08 RX ADMIN — SODIUM CHLORIDE, PRESERVATIVE FREE 10 ML: 5 INJECTION INTRAVENOUS at 01:08

## 2024-08-08 RX ADMIN — MUPIROCIN: 20 OINTMENT TOPICAL at 09:08

## 2024-08-08 RX ADMIN — LORAZEPAM 1 MG: 1 TABLET ORAL at 08:08

## 2024-08-08 RX ADMIN — DILTIAZEM HYDROCHLORIDE 60 MG: 60 TABLET, FILM COATED ORAL at 05:08

## 2024-08-08 RX ADMIN — METOPROLOL TARTRATE 100 MG: 50 TABLET, FILM COATED ORAL at 08:08

## 2024-08-08 RX ADMIN — LEVETIRACETAM 1500 MG: 100 SOLUTION ORAL at 08:08

## 2024-08-08 RX ADMIN — SODIUM CHLORIDE, PRESERVATIVE FREE 10 ML: 5 INJECTION INTRAVENOUS at 05:08

## 2024-08-08 RX ADMIN — IPRATROPIUM BROMIDE AND ALBUTEROL SULFATE 3 ML: 2.5; .5 SOLUTION RESPIRATORY (INHALATION) at 11:08

## 2024-08-08 RX ADMIN — CEFTRIAXONE SODIUM 2 G: 2 INJECTION, POWDER, FOR SOLUTION INTRAMUSCULAR; INTRAVENOUS at 01:08

## 2024-08-08 RX ADMIN — LEVETIRACETAM 1500 MG: 100 SOLUTION ORAL at 09:08

## 2024-08-08 RX ADMIN — QUETIAPINE FUMARATE 25 MG: 25 TABLET ORAL at 08:08

## 2024-08-08 RX ADMIN — DOXYCYCLINE 100 MG: 100 INJECTION, POWDER, LYOPHILIZED, FOR SOLUTION INTRAVENOUS at 12:08

## 2024-08-08 RX ADMIN — SODIUM CHLORIDE, POTASSIUM CHLORIDE, SODIUM LACTATE AND CALCIUM CHLORIDE: 600; 310; 30; 20 INJECTION, SOLUTION INTRAVENOUS at 09:08

## 2024-08-08 RX ADMIN — LORAZEPAM 1 MG: 1 TABLET ORAL at 09:08

## 2024-08-08 RX ADMIN — DOXYCYCLINE 100 MG: 100 INJECTION, POWDER, LYOPHILIZED, FOR SOLUTION INTRAVENOUS at 02:08

## 2024-08-08 RX ADMIN — IPRATROPIUM BROMIDE AND ALBUTEROL SULFATE 3 ML: 2.5; .5 SOLUTION RESPIRATORY (INHALATION) at 08:08

## 2024-08-08 RX ADMIN — PANTOPRAZOLE SODIUM 40 MG: 40 INJECTION, POWDER, LYOPHILIZED, FOR SOLUTION INTRAVENOUS at 10:08

## 2024-08-08 RX ADMIN — Medication: at 09:08

## 2024-08-08 RX ADMIN — DILTIAZEM HYDROCHLORIDE 60 MG: 60 TABLET, FILM COATED ORAL at 06:08

## 2024-08-08 RX ADMIN — PANTOPRAZOLE SODIUM 40 MG: 40 INJECTION, POWDER, LYOPHILIZED, FOR SOLUTION INTRAVENOUS at 12:08

## 2024-08-08 RX ADMIN — BUSPIRONE HYDROCHLORIDE 5 MG: 5 TABLET ORAL at 09:08

## 2024-08-08 RX ADMIN — QUETIAPINE FUMARATE 25 MG: 25 TABLET ORAL at 09:08

## 2024-08-08 RX ADMIN — IPRATROPIUM BROMIDE AND ALBUTEROL SULFATE 3 ML: 2.5; .5 SOLUTION RESPIRATORY (INHALATION) at 03:08

## 2024-08-08 NOTE — NURSING
Nurses Note -- 4 Eyes      8/8/2024   12:57 AM      Skin assessed during: Q Shift Change      [] No Altered Skin Integrity Present    []Prevention Measures Documented      [x] Yes- Altered Skin Integrity Present or Discovered   [] LDA Added if Not in Epic (Describe Wound)   [] New Altered Skin Integrity was Present on Admit and Documented in LDA   [] Wound Image Taken    Wound Care Consulted? Yes    Attending Nurse:  Dora Johnson RN/Staff Member:   iJng

## 2024-08-08 NOTE — PROGRESS NOTES
Inpatient Nutrition Assessment    Admit Date: 8/3/2024   Total duration of encounter: 5 days   Patient Age: 69 y.o.    Nutrition Recommendation/Prescription     Tube Feeding recs:  Impact Peptide 1.5 Bolus 250 ml x 6 times per day + free water flush 200ml 6 times per day  Provides:  2250 kcal (96% est needs)  141 gm protein (100% est needs)  2358 ml free water (100% est needs)    Keep upright for at least an hour after feeding to allow for digestion      Communication of Recommendations: reviewed with nurse    Nutrition Assessment     Malnutrition Assessment/Nutrition-Focused Physical Exam    Malnutrition Context: acute illness or injury (08/05/24 1402)  Malnutrition Level:  (does not meet criteria) (08/05/24 1402)  Energy Intake (Malnutrition): less than or equal to 50% for greater than or equal to 5 days (08/08/24 1417)  Weight Loss (Malnutrition):  (does not meet criteria) (08/05/24 1402)  Subcutaneous Fat (Malnutrition):  (does not meet criteria) (08/05/24 1402)           Muscle Mass (Malnutrition):  (does not meet criteria) (08/05/24 1402)                                   A minimum of two characteristics is recommended for diagnosis of either severe or non-severe malnutrition.    Chart Review    Reason Seen: continuous nutrition monitoring and physician consult for tube feeding recs    Malnutrition Screening Tool Results   Have you recently lost weight without trying?: No  Have you been eating poorly because of a decreased appetite?: No   MST Score: 0   Diagnosis:  Acute Hypoxemic respiratory failure 2/2 bronchitis versus pneumonia  Sepsis 2/2 above  Reflux esophagitis   Atrial Fibrillation with RVR   Normocytic anemia   Hypokalemia     Relevant Medical History: HTN   HLD   CAD   AF on Xarelto   CVA with left-sided deficits, tracheostomy, peg   Hepatic steatosis   Diverticulosis    Scheduled Medications:  albuterol-ipratropium, 3 mL, Q4H WAKE  atorvastatin, 10 mg, Daily  busPIRone, 5 mg, TID  cefTRIAXone  (Rocephin) IV (PEDS and ADULTS), 2 g, Q24H  diltiaZEM, 60 mg, Q6H  doxycycline IV (PEDS and ADULTS), 100 mg, Q12H  finasteride, 5 mg, Daily  levetiracetam, 1,500 mg, BID  LORazepam, 1 mg, BID  methylPREDNISolone injection (PEDS and ADULTS), 40 mg, Daily  metoprolol tartrate, 100 mg, BID  mupirocin, , BID  pantoprazole, 40 mg, BID  QUEtiapine, 25 mg, BID  rivaroxaban, 20 mg, with dinner  scopolamine, 1 patch, Q3 Days  sodium chloride 0.9%, 10 mL, Q6H  tamsulosin, 0.4 mg, QHS  zinc oxide-cod liver oil, , BID    Continuous Infusions:     PRN Medications:  acetaminophen, 999.0148 mg, Q8H PRN  aluminum-magnesium hydroxide-simethicone, 30 mL, QID PRN  melatonin, 6 mg, Nightly PRN  naloxone, 0.02 mg, PRN  ondansetron, 4 mg, Q4H PRN  polyethylene glycol, 17 g, BID PRN  prochlorperazine, 5 mg, Q6H PRN  simethicone, 1 tablet, QID PRN  sodium chloride 0.9%, 10 mL, PRN    Calorie Containing IV Medications: no significant kcals from medications at this time    Recent Labs   Lab 08/03/24  2141 08/04/24  0629 08/05/24  0501 08/06/24  0401 08/07/24  1057 08/07/24  1327    143 142 143 142  --    K 3.4* 3.5 3.4* 3.4* 4.6  --    CALCIUM 8.9 8.4* 8.6* 8.5* 8.6*  --    PHOS  --  3.4 4.6  --   --   --    MG 2.10 2.00 2.30 2.50 2.50  --    CO2 23 22* 26 25 21*  --    BUN 11.7 11.2 11.1 16.3 19.0  --    CREATININE 0.72* 0.76 0.73 0.72* 0.77  --    EGFRNORACEVR >60 >60 >60 >60 >60  --    GLUCOSE 111 121* 144* 163* 113  --    BILITOT 0.6 1.0 0.8 0.5 0.5  --    ALKPHOS 175* 168* 147 146 142  --    ALT 23 21 20 22 25  --    AST 23 22 19 19 40*  --    ALBUMIN 3.4 3.4 3.1* 3.2* 3.4  --    LIPASE 27  --   --   --   --   --    WBC 11.15 11.06 4.78 7.41  --  8.39   HGB 13.4* 11.8* 10.9* 12.1*  --  12.5*   HCT 42.0 36.1* 37.5* 38.9*  --  40.5*     Nutrition Orders:  Diet NPO  Tube Feedings/Formulas 80; Impact Peptide 1.5; Peg; Patel - Orange; 2 times daily; 100; Every 2 hours    Appetite/Oral Intake: NPO/not applicable  Factors Affecting  "Nutritional Intake: reflux and vomiting  Social Needs Impacting Access to Food: none identified  Food/Druze/Cultural Preferences: unable to obtain  Food Allergies: no known food allergies  Last Bowel Movement: 08/07/24  Wound(s):     Wound 08/05/24 1420 Moisture associated dermatitis Right Groin-Tissue loss description: Partial thickness       Wound 08/05/24 1420 Moisture associated dermatitis Left Groin-Tissue loss description: Partial thickness       Wound 08/05/24 1420 Pressure Injury Left Knee-Tissue loss description: Full thickness       Altered Skin Integrity 02/26/24 1100 lower Buttocks Moisture associated dermatitis-Tissue loss description: Partial thickness     Comments    8/5/24 pt on home tube feeding, weight appears stable in EMR     8/8/24 nurse reports pt having difficulty tolerating tube feeding, regurgitating formula, not able to stay upright and slumps over frequently; will try bolus feeds with pt staying upright for about an hour after feeding    Anthropometrics    Height: 5' 10" (177.8 cm), Height Method: Estimated  Last Weight: 117.9 kg (260 lb) (08/03/24 1955), Weight Method: Estimated  BMI (Calculated): 37.3  BMI Classification: obese grade II (BMI 35-39.9)        Ideal Body Weight (IBW), Male: 166 lb     % Ideal Body Weight, Male (lb): 156.63 %                          Usual Weight Provided By: EMR weight history    Wt Readings from Last 5 Encounters:   08/03/24 117.9 kg (260 lb)   07/18/24 117.9 kg (260 lb)   06/25/24 90.7 kg (200 lb)   05/15/24 90.7 kg (200 lb)   02/25/24 95.6 kg (210 lb 12.2 oz)     Weight Change(s) Since Admission:   Wt Readings from Last 1 Encounters:   08/03/24 1955 117.9 kg (260 lb)   Admit Weight: 117.9 kg (260 lb) (08/03/24 1955), Weight Method: Estimated    Estimated Needs    Weight Used For Calorie Calculations: 117.9 kg (259 lb 14.8 oz)  Energy Calorie Requirements (kcal): 2346 kcal (1.2 stress factor)  Energy Need Method: [place]-St Jeor  Weight Used For " Protein Calculations: 117.9 kg (259 lb 14.8 oz)  Protein Requirements: 141gm (1.2 gm/kg)  Fluid Requirements (mL): 2346 1ml/kcal)        Enteral Nutrition     Formula: Impact Peptide 1.5 [place]  Rate/Volume: 25ml/hr  Water Flushes: 100ml every 2h  Additives/Modulars: none at this time  Route: PEG tube  Method: continuous  Total Nutrition Provided by Tube Feeding, Additives, and Flushes:  Calories Provided  500 kcal/d, 21% needs   Protein Provided  47 g/d, 33% needs   Fluid Provided  1585 ml/d, 68% needs   Continuous feeding calculations based on estimated 20 hr/d run time unless otherwise stated.    Parenteral Nutrition     Patient not receiving parenteral nutrition support at this time.    Evaluation of Received Nutrient Intake    Calories: not meeting estimated needs  Protein: not meeting estimated needs    Patient Education     Not applicable.    Nutrition Diagnosis     PES: Increased nutrient needs (protein) related to increased protein energy demand for wound healing as evidenced by multiple wounds/pressure injury. (active)         Nutrition Interventions     Intervention(s): collaboration with other providers    Goal: Meet greater than 80% of nutritional needs by follow-up. (goal progressing)  Goal: Maintain weight throughout hospitalization. (goal progressing)    Nutrition Goals & Monitoring     Dietitian will monitor: energy intake, enteral nutrition intake, and weight change  Discharge planning: resume home regimen  Nutrition Risk/Follow-Up: high (follow-up in 1-4 days)   Please consult if re-assessment needed sooner.

## 2024-08-08 NOTE — NURSING
Nurses Note -- 4 Eyes      8/7/2024   8:10 PM      Skin assessed during: Q Shift Change      [] No Altered Skin Integrity Present    []Prevention Measures Documented      [x] Yes- Altered Skin Integrity Present or Discovered   [] LDA Added if Not in Epic (Describe Wound)   [] New Altered Skin Integrity was Present on Admit and Documented in LDA   [] Wound Image Taken    Wound Care Consulted? Yes    Attending Nurse:  Jing Johnson RN/Staff Member:  Phi

## 2024-08-08 NOTE — PROGRESS NOTES
[facility]  Hospital Medicine Progress Note        Chief Complaint: Inpatient Follow-up uti/pna/afib rvr/vomiting     HPI:   69 y.o. Black or  male with a past medical history of hypertension, hyperlipidemia, coronary artery disease, atrial fibrillation on Xarelto, CVA with left-sided deficits, trach and PEG dependent, liver steatosis, diverticulosis, BPH and resident of [facility]. The patient presented to [facility] on 8/3/2024 with a primary complaint of vomiting from his trach.     While in route EMS reported patient vomiting from his mouth. He went into AFib RVR in which he was given 20 mg of Cardizem, 4 mg of Zofran and a 250 mL bolus prior to arrival to the ED. upon presentation to the ED temperature 98.8° F, reaching a max of 100.5, , blood pressure 143/90, respiratory rate 25 and SpO2 100% on room air.  Labs with H&H 13.4/42, potassium 3.4, alkaline phosphatase 175, troponin 0.016, lactic acid 2.4.  Influenza A/B, RSV and SARS-COV-2 PCR negative.  UA with trace mucus, no squamous epithelial cells, trace bacteria, 11-20 WBCs, greater than 100 RBCs, 25 leukocyte esterase, 2+ blood and 2+ protein.  EKG atrial fibrillation with rapid ventricular rate of 118.  Chest x-ray with right infra hilar lower lung zone atelectasis and possible small left pleural effusion.  CT abdomen pelvis prominent distal esophageal wall thickening suggestive of esophagitis but no otherwise acute findings.  In ED patient received DuoNebs, Cardizem, Reglan, Zofran, Protonix and Zosyn.  Patient was admitted to hospital medicine services for further medical management.  Continued on IV Zosyn, IV Solu-Medrol, DuoNebs.  Blood cultures negative.  Urine culture showing GNRs.  Blood cultures remain negative.  Cardiology on board for AF/RVR; follow recommendations. IV Cardizem discontinued on 08/05 with well controlled HR. CIS signed off on 08/06.  Patient continues to have  episodes of bringing up feeds given through PEG even at low rates of 20-30 mL/hour.  GI consulted for reflux esophagitis leading to patient not being able to tolerate tube feeds via PEG. Urine culture growing Klebsiella sensitive to Rocephin.    Interval Hx:   8/7/24- patient stated he was doing well and had no new complaints.  Saturating well on RA via tracheostomy.  Will continue following GI recommendations.  Will up-titrate TF as tolerated.  Replacing K.    08/08/2024 Dr. Maki-chart reviewed patient examined.  Electrolytes replaced yesterday.  Magnesium on 08/07/2024 at 2.5.  Tolerating tube feedings at 25 cc at this moment. Pt sleeping at this moment . Sister in room .     Case was discussed with patient's nurse on the floor.    Objective/physical exam:  General: alert male lying comfortably in bed, in no acute distress  HENT: oral and oropharyngeal mucosa moist, pink, with no erythema or exudates, no ear pain or discharge  Neck:  Tracheostomy in place; normal neck movement, no lymph nodes or swellings, no JVD or Carotid bruit  Respiratory: clear breathing sounds bilaterally, no crackles, rales, ronchi or wheezes  Cardiovascular: IRR w cvr   Peripheral Vascular: no lesions, ulcers or erosions, normal peripheral pulses and no pedal edema  Gastrointestinal:  PEG in place; soft, non-tender, non-distended abdomen, no guarding, rigidity or rebound tenderness, normal bowel sounds  Integumentary: normal skin color, no rashes or lesions  Neuro: AAO x 3; motor strength 5/5 in B/L UEs & LEs; sensation intact to gross and fine touch B/L; CN II-XII grossly intact    VITAL SIGNS: 24 HRS MIN & MAX LAST   Temp  Min: 97.5 °F (36.4 °C)  Max: 98.5 °F (36.9 °C) 97.5 °F (36.4 °C)   BP  Min: 125/76  Max: 167/94 (!) 155/102   Pulse  Min: 72  Max: 105  82   Resp  Min: 12  Max: 24 12   SpO2  Min: 95 %  Max: 99 % 99 %     I have reviewed the following labs:  Recent Labs   Lab 08/05/24  0501 08/06/24  0401 08/07/24  1327   WBC 4.78  7.41 8.39   RBC 4.14* 4.63* 4.78   HGB 10.9* 12.1* 12.5*   HCT 37.5* 38.9* 40.5*   MCV 90.6 84.0 84.7   MCH 26.3* 26.1* 26.2*   MCHC 29.1* 31.1* 30.9*   RDW 15.9 15.7 15.6    266 248   MPV 9.8 10.0 10.1     Recent Labs   Lab 08/04/24  1345 08/05/24  0501 08/06/24  0401 08/07/24  1057   NA  --  142 143 142   K  --  3.4* 3.4* 4.6   CL  --  107 108* 106   CO2  --  26 25 21*   BUN  --  11.1 16.3 19.0   CREATININE  --  0.73 0.72* 0.77   CALCIUM  --  8.6* 8.5* 8.6*   PH 7.660*  --   --   --    MG  --  2.30 2.50 2.50   ALBUMIN  --  3.1* 3.2* 3.4   ALKPHOS  --  147 146 142   ALT  --  20 22 25   AST  --  19 19 40*   BILITOT  --  0.8 0.5 0.5     Assessment/Plan:  Acute Hypoxemic respiratory failure 2/2 bronchitis versus pneumonia  Sepsis 2/2 above  Reflux esophagitis   Atrial Fibrillation with RVR   - now controlled  - on doac   Normocytic anemia   HTN   HLD   CAD   CVA with left-sided deficits, tracheostomy, peg   Hepatic steatosis   Diverticulosis   BPH   Hypokalemia       no new complaints  Saturating well on RA  On IV Rocephin/Doxycycline  Blood cultures negative  Urine culture growing Klebsiella- rocephin   Cardiology signed off  GI consulted; will follow recommendations  Continued on diltiazem 60 mg q.6, metoprolol tartrate 100 mg b.i.d.   DuoNebs q.4   On TF via PEG; up titrate as needed  On atorvastatin, buspirone, finasteride, Keppra b.i.d., Ativan b.i.d., Protonix b.i.d., Seroquel b.i.d., Xarelto , tamsulosin  Continue monitoring symptoms      Will be discharge once tube feedings at goal . No new issues today . Am labs    VTE prophylaxis:  Xarelto    Patient condition:  Stable    Anticipated discharge and Disposition:  nursing home resident        All diagnosis and differential diagnosis have been reviewed; assessment and plan has been documented; I have personally reviewed the labs and test results that are presently available; I have reviewed the patients medication list; I have reviewed the consulting  providers response and recommendations. I have reviewed or attempted to review medical records based upon their availability    All of the patient's questions have been  addressed and answered. Patient's is agreeable to the above stated plan. I will continue to monitor closely and make adjustments to medical management as needed.    Portions of this note dictated using EMR integrated voice recognition software, and may be subject to voice recognition errors not corrected at proofreading. Please contact writer for clarification if needed.   _____________________________________________________________________    Malnutrition Status:    Scheduled Med:   albuterol-ipratropium  3 mL Nebulization Q4H WAKE    atorvastatin  10 mg Per G Tube Daily    busPIRone  5 mg Per G Tube TID    cefTRIAXone (Rocephin) IV (PEDS and ADULTS)  2 g Intravenous Q24H    diltiaZEM  60 mg Per G Tube Q6H    doxycycline IV (PEDS and ADULTS)  100 mg Intravenous Q12H    finasteride  5 mg Oral Daily    levetiracetam  1,500 mg Per G Tube BID    LORazepam  1 mg Per G Tube BID    methylPREDNISolone injection (PEDS and ADULTS)  40 mg Intravenous Daily    metoprolol tartrate  100 mg Per G Tube BID    mupirocin   Nasal BID    pantoprazole  40 mg Intravenous BID    QUEtiapine  25 mg Per G Tube BID    rivaroxaban  20 mg Per G Tube with dinner    scopolamine  1 patch Transdermal Q3 Days    sodium chloride 0.9%  10 mL Intravenous Q6H    tamsulosin  0.4 mg Oral QHS    zinc oxide-cod liver oil   Topical (Top) BID      Continuous Infusions:   lactated ringers   Intravenous Continuous 75 mL/hr at 08/08/24 0923 New Bag at 08/08/24 0923      PRN Meds:    Current Facility-Administered Medications:     acetaminophen, 999.0148 mg, Oral, Q8H PRN    aluminum-magnesium hydroxide-simethicone, 30 mL, Oral, QID PRN    melatonin, 6 mg, Oral, Nightly PRN    naloxone, 0.02 mg, Intravenous, PRN    ondansetron, 4 mg, Intravenous, Q4H PRN    polyethylene glycol, 17 g, Oral, BID  PRN    prochlorperazine, 5 mg, Intravenous, Q6H PRN    simethicone, 1 tablet, Oral, QID PRN    Flushing PICC/Midline Protocol, , , Until Discontinued **AND** sodium chloride 0.9%, 10 mL, Intravenous, Q6H **AND** sodium chloride 0.9%, 10 mL, Intravenous, PRN     X-Ray Chest 1 View  Narrative: EXAMINATION:  XR CHEST 1 VIEW    CPT 96648    CLINICAL HISTORY:  Sob;    COMPARISON:  August 3, 2024    FINDINGS:  Examination reveals cardiomediastinal silhouette and pleuroparenchymal changes are essentially unchanged as compared with the previous exam  Impression: No significant change    Electronically signed by: Vik Keen  Date:    08/06/2024  Time:    14:06      Bryon Maki MD  Department of Hospital Medicine   [facility]   08/08/2024

## 2024-08-08 NOTE — NURSING
Nurses Note -- 4 Eyes      8/8/2024   7:30 AM      Skin assessed during: Q Shift Change      [] No Altered Skin Integrity Present    []Prevention Measures Documented      [x] Yes- Altered Skin Integrity Present or Discovered   [] LDA Added if Not in Epic (Describe Wound)   [] New Altered Skin Integrity was Present on Admit and Documented in LDA   [] Wound Image Taken    Wound Care Consulted? Yes    Attending Nurse:  Viktoria FAN    Second RN/Staff Member:   Dora FAN

## 2024-08-09 LAB
ANION GAP SERPL CALC-SCNC: 11 MEQ/L
BUN SERPL-MCNC: 24 MG/DL (ref 8.4–25.7)
CALCIUM SERPL-MCNC: 8.6 MG/DL (ref 8.8–10)
CHLORIDE SERPL-SCNC: 108 MMOL/L (ref 98–107)
CO2 SERPL-SCNC: 27 MMOL/L (ref 23–31)
CREAT SERPL-MCNC: 0.71 MG/DL (ref 0.73–1.18)
CREAT/UREA NIT SERPL: 34
GFR SERPLBLD CREATININE-BSD FMLA CKD-EPI: >60 ML/MIN/1.73/M2
GLUCOSE SERPL-MCNC: 136 MG/DL (ref 82–115)
POTASSIUM SERPL-SCNC: 2.9 MMOL/L (ref 3.5–5.1)
SODIUM SERPL-SCNC: 146 MMOL/L (ref 136–145)

## 2024-08-09 PROCEDURE — 94760 N-INVAS EAR/PLS OXIMETRY 1: CPT

## 2024-08-09 PROCEDURE — 25000003 PHARM REV CODE 250

## 2024-08-09 PROCEDURE — 94640 AIRWAY INHALATION TREATMENT: CPT

## 2024-08-09 PROCEDURE — 94761 N-INVAS EAR/PLS OXIMETRY MLT: CPT

## 2024-08-09 PROCEDURE — 25000242 PHARM REV CODE 250 ALT 637 W/ HCPCS: Performed by: INTERNAL MEDICINE

## 2024-08-09 PROCEDURE — 99900031 HC PATIENT EDUCATION (STAT)

## 2024-08-09 PROCEDURE — 25000003 PHARM REV CODE 250: Performed by: INTERNAL MEDICINE

## 2024-08-09 PROCEDURE — 63600175 PHARM REV CODE 636 W HCPCS: Performed by: STUDENT IN AN ORGANIZED HEALTH CARE EDUCATION/TRAINING PROGRAM

## 2024-08-09 PROCEDURE — 99900026 HC AIRWAY MAINTENANCE (STAT)

## 2024-08-09 PROCEDURE — 80048 BASIC METABOLIC PNL TOTAL CA: CPT | Performed by: INTERNAL MEDICINE

## 2024-08-09 PROCEDURE — 63600175 PHARM REV CODE 636 W HCPCS: Performed by: INTERNAL MEDICINE

## 2024-08-09 PROCEDURE — 99900035 HC TECH TIME PER 15 MIN (STAT)

## 2024-08-09 PROCEDURE — 21400001 HC TELEMETRY ROOM

## 2024-08-09 PROCEDURE — 27200966 HC CLOSED SUCTION SYSTEM

## 2024-08-09 PROCEDURE — 25000003 PHARM REV CODE 250: Performed by: STUDENT IN AN ORGANIZED HEALTH CARE EDUCATION/TRAINING PROGRAM

## 2024-08-09 PROCEDURE — 36415 COLL VENOUS BLD VENIPUNCTURE: CPT | Performed by: INTERNAL MEDICINE

## 2024-08-09 PROCEDURE — A4216 STERILE WATER/SALINE, 10 ML: HCPCS | Performed by: INTERNAL MEDICINE

## 2024-08-09 RX ORDER — HYDRALAZINE HYDROCHLORIDE 20 MG/ML
20 INJECTION INTRAMUSCULAR; INTRAVENOUS
Status: DISCONTINUED | OUTPATIENT
Start: 2024-08-09 | End: 2024-08-10 | Stop reason: HOSPADM

## 2024-08-09 RX ORDER — POTASSIUM CHLORIDE 14.9 MG/ML
20 INJECTION INTRAVENOUS
Status: COMPLETED | OUTPATIENT
Start: 2024-08-09 | End: 2024-08-09

## 2024-08-09 RX ADMIN — POTASSIUM CHLORIDE 20 MEQ: 14.9 INJECTION, SOLUTION INTRAVENOUS at 10:08

## 2024-08-09 RX ADMIN — QUETIAPINE FUMARATE 25 MG: 25 TABLET ORAL at 08:08

## 2024-08-09 RX ADMIN — METOPROLOL TARTRATE 100 MG: 50 TABLET, FILM COATED ORAL at 08:08

## 2024-08-09 RX ADMIN — IPRATROPIUM BROMIDE AND ALBUTEROL SULFATE 3 ML: 2.5; .5 SOLUTION RESPIRATORY (INHALATION) at 04:08

## 2024-08-09 RX ADMIN — DILTIAZEM HYDROCHLORIDE 60 MG: 60 TABLET, FILM COATED ORAL at 06:08

## 2024-08-09 RX ADMIN — DILTIAZEM HYDROCHLORIDE 60 MG: 60 TABLET, FILM COATED ORAL at 12:08

## 2024-08-09 RX ADMIN — DOXYCYCLINE 100 MG: 100 INJECTION, POWDER, LYOPHILIZED, FOR SOLUTION INTRAVENOUS at 01:08

## 2024-08-09 RX ADMIN — DILTIAZEM HYDROCHLORIDE 60 MG: 60 TABLET, FILM COATED ORAL at 03:08

## 2024-08-09 RX ADMIN — LORAZEPAM 1 MG: 1 TABLET ORAL at 08:08

## 2024-08-09 RX ADMIN — DILTIAZEM HYDROCHLORIDE 60 MG: 60 TABLET, FILM COATED ORAL at 05:08

## 2024-08-09 RX ADMIN — METHYLPREDNISOLONE SODIUM SUCCINATE 40 MG: 40 INJECTION, POWDER, FOR SOLUTION INTRAMUSCULAR; INTRAVENOUS at 08:08

## 2024-08-09 RX ADMIN — SODIUM CHLORIDE, PRESERVATIVE FREE 10 ML: 5 INJECTION INTRAVENOUS at 08:08

## 2024-08-09 RX ADMIN — BUSPIRONE HYDROCHLORIDE 5 MG: 5 TABLET ORAL at 03:08

## 2024-08-09 RX ADMIN — PANTOPRAZOLE SODIUM 40 MG: 40 INJECTION, POWDER, LYOPHILIZED, FOR SOLUTION INTRAVENOUS at 08:08

## 2024-08-09 RX ADMIN — DOXYCYCLINE 100 MG: 100 INJECTION, POWDER, LYOPHILIZED, FOR SOLUTION INTRAVENOUS at 12:08

## 2024-08-09 RX ADMIN — FINASTERIDE 5 MG: 5 TABLET, FILM COATED ORAL at 08:08

## 2024-08-09 RX ADMIN — BUSPIRONE HYDROCHLORIDE 5 MG: 5 TABLET ORAL at 08:08

## 2024-08-09 RX ADMIN — IPRATROPIUM BROMIDE AND ALBUTEROL SULFATE 3 ML: 2.5; .5 SOLUTION RESPIRATORY (INHALATION) at 07:08

## 2024-08-09 RX ADMIN — LEVETIRACETAM 1500 MG: 100 SOLUTION ORAL at 08:08

## 2024-08-09 RX ADMIN — TAMSULOSIN HYDROCHLORIDE 0.4 MG: 0.4 CAPSULE ORAL at 08:08

## 2024-08-09 RX ADMIN — PANTOPRAZOLE SODIUM 40 MG: 40 INJECTION, POWDER, LYOPHILIZED, FOR SOLUTION INTRAVENOUS at 09:08

## 2024-08-09 RX ADMIN — Medication: at 08:08

## 2024-08-09 RX ADMIN — IPRATROPIUM BROMIDE AND ALBUTEROL SULFATE 3 ML: 2.5; .5 SOLUTION RESPIRATORY (INHALATION) at 12:08

## 2024-08-09 RX ADMIN — CEFTRIAXONE SODIUM 2 G: 2 INJECTION, POWDER, FOR SOLUTION INTRAMUSCULAR; INTRAVENOUS at 12:08

## 2024-08-09 RX ADMIN — SODIUM CHLORIDE, PRESERVATIVE FREE 10 ML: 5 INJECTION INTRAVENOUS at 05:08

## 2024-08-09 RX ADMIN — ATORVASTATIN CALCIUM 10 MG: 10 TABLET, FILM COATED ORAL at 08:08

## 2024-08-09 RX ADMIN — POTASSIUM CHLORIDE 20 MEQ: 14.9 INJECTION, SOLUTION INTRAVENOUS at 08:08

## 2024-08-09 RX ADMIN — RIVAROXABAN 20 MG: 10 TABLET, FILM COATED ORAL at 06:08

## 2024-08-09 NOTE — PROGRESS NOTES
[facility]  Hospital Medicine Progress Note        Chief Complaint: Inpatient Follow-up uti/pna/afib rvr/vomiting     HPI:   69 y.o. Black or  male with a past medical history of hypertension, hyperlipidemia, coronary artery disease, atrial fibrillation on Xarelto, CVA with left-sided deficits, trach and PEG dependent, liver steatosis, diverticulosis, BPH and resident of [facility]. The patient presented to [facility] on 8/3/2024 with a primary complaint of vomiting from his trach.     While in route EMS reported patient vomiting from his mouth. He went into AFib RVR in which he was given 20 mg of Cardizem, 4 mg of Zofran and a 250 mL bolus prior to arrival to the ED. upon presentation to the ED temperature 98.8° F, reaching a max of 100.5, , blood pressure 143/90, respiratory rate 25 and SpO2 100% on room air.  Labs with H&H 13.4/42, potassium 3.4, alkaline phosphatase 175, troponin 0.016, lactic acid 2.4.  Influenza A/B, RSV and SARS-COV-2 PCR negative.  UA with trace mucus, no squamous epithelial cells, trace bacteria, 11-20 WBCs, greater than 100 RBCs, 25 leukocyte esterase, 2+ blood and 2+ protein.  EKG atrial fibrillation with rapid ventricular rate of 118.  Chest x-ray with right infra hilar lower lung zone atelectasis and possible small left pleural effusion.  CT abdomen pelvis prominent distal esophageal wall thickening suggestive of esophagitis but no otherwise acute findings.  In ED patient received DuoNebs, Cardizem, Reglan, Zofran, Protonix and Zosyn.  Patient was admitted to hospital medicine services for further medical management.  Continued on IV Zosyn, IV Solu-Medrol, DuoNebs.  Blood cultures negative.  Urine culture showing GNRs.  Blood cultures remain negative.  Cardiology on board for AF/RVR; follow recommendations. IV Cardizem discontinued on 08/05 with well controlled HR. CIS signed off on 08/06.  Patient continues to have  episodes of bringing up feeds given through PEG even at low rates of 20-30 mL/hour.  GI consulted for reflux esophagitis leading to patient not being able to tolerate tube feeds via PEG. Urine culture growing Klebsiella sensitive to Rocephin.    Interval Hx:   8/7/24- patient stated he was doing well and had no new complaints.  Saturating well on RA via tracheostomy.  Will continue following GI recommendations.  Will up-titrate TF as tolerated.  Replacing K.    08/08/2024 Dr. Maki-chart reviewed patient examined.  Electrolytes replaced yesterday.  Magnesium on 08/07/2024 at 2.5.  Tolerating tube feedings at 25 cc at this moment. Pt sleeping at this moment . Sister in room .     8/9/24 dr maki - chart review and patient examined.  Tolerating bolus feedings without nausea or vomiting.  Vital signs are stable heart rate of 106 no new leukocytosis but hypokalemic at 2.9.  We will start replacement of potassium, check magnesium for a.m. a make plans for discharge and transfer back to nursing home    Case was discussed with patient's nurse on the floor.    Objective/physical exam:  General: alert male lying comfortably in bed, in no acute distress  HENT: oral and oropharyngeal mucosa moist, pink, with no erythema or exudates, no ear pain or discharge  Neck:  Tracheostomy in place; normal neck movement, no lymph nodes or swellings, no JVD or Carotid bruit  Respiratory: clear breathing sounds bilaterally, no crackles, rales, ronchi or wheezes  Cardiovascular: IRR w cvr   Peripheral Vascular: no lesions, ulcers or erosions, normal peripheral pulses and no pedal edema  Gastrointestinal:  PEG in place; soft, non-tender, non-distended abdomen, no guarding, rigidity or rebound tenderness, normal bowel sounds  Integumentary: normal skin color, no rashes or lesions  Neuro: AAO x 3; motor strength 5/5 in B/L UEs & LEs; sensation intact to gross and fine touch B/L; CN II-XII grossly intact    VITAL SIGNS: 24 HRS MIN & MAX LAST    Temp  Min: 97.1 °F (36.2 °C)  Max: 98.7 °F (37.1 °C) 97.5 °F (36.4 °C)   BP  Min: 132/89  Max: 177/104 132/89   Pulse  Min: 78  Max: 106  106   Resp  Min: 11  Max: 24 (!) 24   SpO2  Min: 92 %  Max: 99 % 95 %     I have reviewed the following labs:  Recent Labs   Lab 08/05/24  0501 08/06/24  0401 08/07/24  1327   WBC 4.78 7.41 8.39   RBC 4.14* 4.63* 4.78   HGB 10.9* 12.1* 12.5*   HCT 37.5* 38.9* 40.5*   MCV 90.6 84.0 84.7   MCH 26.3* 26.1* 26.2*   MCHC 29.1* 31.1* 30.9*   RDW 15.9 15.7 15.6    266 248   MPV 9.8 10.0 10.1     Recent Labs   Lab 08/04/24  1345 08/05/24  0501 08/06/24  0401 08/07/24  1057 08/09/24  0618   NA  --  142 143 142 146*   K  --  3.4* 3.4* 4.6 2.9*   CL  --  107 108* 106 108*   CO2  --  26 25 21* 27   BUN  --  11.1 16.3 19.0 24.0   CREATININE  --  0.73 0.72* 0.77 0.71*   CALCIUM  --  8.6* 8.5* 8.6* 8.6*   PH 7.660*  --   --   --   --    MG  --  2.30 2.50 2.50  --    ALBUMIN  --  3.1* 3.2* 3.4  --    ALKPHOS  --  147 146 142  --    ALT  --  20 22 25  --    AST  --  19 19 40*  --    BILITOT  --  0.8 0.5 0.5  --      Assessment/Plan:  Acute Hypoxemic respiratory failure 2/2 bronchitis versus pneumonia  Sepsis 2/2 above  Reflux esophagitis   Atrial Fibrillation with RVR   - now controlled  - on doac   Normocytic anemia   HTN   HLD   CAD   CVA with left-sided deficits, tracheostomy, peg   Hepatic steatosis   Diverticulosis   BPH   Hypokalemia       no new complaints  Saturating well on RA  On IV Rocephin/Doxycycline  Blood cultures negative  Urine culture growing Klebsiella- rocephin   Cardiology signed off  GI consulted; will follow recommendations  Continued on diltiazem 60 mg q.6, metoprolol tartrate 100 mg b.i.d.   DuoNebs q.4   On TF via PEG; tolerating bolus feedings   On atorvastatin, buspirone, finasteride, Keppra b.i.d., Ativan b.i.d., Protonix b.i.d., Seroquel b.i.d., Xarelto , tamsulosin  Continue monitoring symptoms    Will return to nursing home in am       Will be discharge  once tube feedings at goal . No new issues today . Am labs    VTE prophylaxis:  Xarelto    Patient condition:  Stable    Anticipated discharge and Disposition:  nursing home resident        All diagnosis and differential diagnosis have been reviewed; assessment and plan has been documented; I have personally reviewed the labs and test results that are presently available; I have reviewed the patients medication list; I have reviewed the consulting providers response and recommendations. I have reviewed or attempted to review medical records based upon their availability    All of the patient's questions have been  addressed and answered. Patient's is agreeable to the above stated plan. I will continue to monitor closely and make adjustments to medical management as needed.    Portions of this note dictated using EMR integrated voice recognition software, and may be subject to voice recognition errors not corrected at proofreading. Please contact writer for clarification if needed.   _____________________________________________________________________    Malnutrition Status:    Scheduled Med:   albuterol-ipratropium  3 mL Nebulization Q4H WAKE    atorvastatin  10 mg Per G Tube Daily    busPIRone  5 mg Per G Tube TID    cefTRIAXone (Rocephin) IV (PEDS and ADULTS)  2 g Intravenous Q24H    diltiaZEM  60 mg Per G Tube Q6H    doxycycline IV (PEDS and ADULTS)  100 mg Intravenous Q12H    finasteride  5 mg Oral Daily    levetiracetam  1,500 mg Per G Tube BID    LORazepam  1 mg Per G Tube BID    metoprolol tartrate  100 mg Per G Tube BID    pantoprazole  40 mg Intravenous BID    QUEtiapine  25 mg Per G Tube BID    rivaroxaban  20 mg Per G Tube with dinner    scopolamine  1 patch Transdermal Q3 Days    sodium chloride 0.9%  10 mL Intravenous Q6H    tamsulosin  0.4 mg Oral QHS    zinc oxide-cod liver oil   Topical (Top) BID      Continuous Infusions:       PRN Meds:    Current Facility-Administered Medications:      acetaminophen, 999.0148 mg, Oral, Q8H PRN    aluminum-magnesium hydroxide-simethicone, 30 mL, Oral, QID PRN    hydrALAZINE, 20 mg, Intravenous, Q2H PRN    melatonin, 6 mg, Oral, Nightly PRN    naloxone, 0.02 mg, Intravenous, PRN    ondansetron, 4 mg, Intravenous, Q4H PRN    polyethylene glycol, 17 g, Oral, BID PRN    prochlorperazine, 5 mg, Intravenous, Q6H PRN    simethicone, 1 tablet, Oral, QID PRN    Flushing PICC/Midline Protocol, , , Until Discontinued **AND** sodium chloride 0.9%, 10 mL, Intravenous, Q6H **AND** sodium chloride 0.9%, 10 mL, Intravenous, PRN     X-Ray Chest 1 View  Narrative: EXAMINATION:  XR CHEST 1 VIEW    CPT 58018    CLINICAL HISTORY:  Sob;    COMPARISON:  August 3, 2024    FINDINGS:  Examination reveals cardiomediastinal silhouette and pleuroparenchymal changes are essentially unchanged as compared with the previous exam  Impression: No significant change    Electronically signed by: Vik Keen  Date:    08/06/2024  Time:    14:06      Bryon Maki MD  Department of Hospital Medicine   [facility]   08/09/2024

## 2024-08-09 NOTE — PROGRESS NOTES
Inpatient Nutrition Assessment    Admit Date: 8/3/2024   Total duration of encounter: 6 days   Patient Age: 69 y.o.    Nutrition Recommendation/Prescription     Tube Feeding recs:  Impact Peptide 1.5 Bolus 250 ml x 6 times per day + free water flush 200ml 6 times per day  Provides:  2250 kcal (96% est needs)  141 gm protein (100% est needs)  2358 ml free water (100% est needs)    Keep upright for at least an hour after feeding to allow for digestion      Communication of Recommendations: reviewed with nurse    Nutrition Assessment     Malnutrition Assessment/Nutrition-Focused Physical Exam    Malnutrition Context: acute illness or injury (08/05/24 1402)  Malnutrition Level:  (does not meet criteria) (08/05/24 1402)  Energy Intake (Malnutrition): less than or equal to 50% for greater than or equal to 5 days (08/08/24 1417)  Weight Loss (Malnutrition):  (does not meet criteria) (08/05/24 1402)  Subcutaneous Fat (Malnutrition):  (does not meet criteria) (08/05/24 1402)           Muscle Mass (Malnutrition):  (does not meet criteria) (08/05/24 1402)                                   A minimum of two characteristics is recommended for diagnosis of either severe or non-severe malnutrition.    Chart Review    Reason Seen: continuous nutrition monitoring and physician consult for tube feeding recs    Malnutrition Screening Tool Results   Have you recently lost weight without trying?: No  Have you been eating poorly because of a decreased appetite?: No   MST Score: 0   Diagnosis:  Acute Hypoxemic respiratory failure 2/2 bronchitis versus pneumonia  Sepsis 2/2 above  Reflux esophagitis   Atrial Fibrillation with RVR   Normocytic anemia   Hypokalemia     Relevant Medical History: HTN   HLD   CAD   AF on Xarelto   CVA with left-sided deficits, tracheostomy, peg   Hepatic steatosis   Diverticulosis    Scheduled Medications:  albuterol-ipratropium, 3 mL, Q4H WAKE  atorvastatin, 10 mg, Daily  busPIRone, 5 mg, TID  cefTRIAXone  (Rocephin) IV (PEDS and ADULTS), 2 g, Q24H  diltiaZEM, 60 mg, Q6H  doxycycline IV (PEDS and ADULTS), 100 mg, Q12H  finasteride, 5 mg, Daily  levetiracetam, 1,500 mg, BID  LORazepam, 1 mg, BID  metoprolol tartrate, 100 mg, BID  pantoprazole, 40 mg, BID  QUEtiapine, 25 mg, BID  rivaroxaban, 20 mg, with dinner  scopolamine, 1 patch, Q3 Days  sodium chloride 0.9%, 10 mL, Q6H  tamsulosin, 0.4 mg, QHS  zinc oxide-cod liver oil, , BID    Continuous Infusions:     PRN Medications:  acetaminophen, 999.0148 mg, Q8H PRN  aluminum-magnesium hydroxide-simethicone, 30 mL, QID PRN  hydrALAZINE, 20 mg, Q2H PRN  melatonin, 6 mg, Nightly PRN  naloxone, 0.02 mg, PRN  ondansetron, 4 mg, Q4H PRN  polyethylene glycol, 17 g, BID PRN  prochlorperazine, 5 mg, Q6H PRN  simethicone, 1 tablet, QID PRN  sodium chloride 0.9%, 10 mL, PRN    Calorie Containing IV Medications: no significant kcals from medications at this time    Recent Labs   Lab 08/03/24  2141 08/04/24  0629 08/05/24  0501 08/06/24  0401 08/07/24  1057 08/07/24  1327 08/09/24  0618    143 142 143 142  --  146*   K 3.4* 3.5 3.4* 3.4* 4.6  --  2.9*   CALCIUM 8.9 8.4* 8.6* 8.5* 8.6*  --  8.6*   PHOS  --  3.4 4.6  --   --   --   --    MG 2.10 2.00 2.30 2.50 2.50  --   --    CO2 23 22* 26 25 21*  --  27   BUN 11.7 11.2 11.1 16.3 19.0  --  24.0   CREATININE 0.72* 0.76 0.73 0.72* 0.77  --  0.71*   EGFRNORACEVR >60 >60 >60 >60 >60  --  >60   GLUCOSE 111 121* 144* 163* 113  --  136*   BILITOT 0.6 1.0 0.8 0.5 0.5  --   --    ALKPHOS 175* 168* 147 146 142  --   --    ALT 23 21 20 22 25  --   --    AST 23 22 19 19 40*  --   --    ALBUMIN 3.4 3.4 3.1* 3.2* 3.4  --   --    LIPASE 27  --   --   --   --   --   --    WBC 11.15 11.06 4.78 7.41  --  8.39  --    HGB 13.4* 11.8* 10.9* 12.1*  --  12.5*  --    HCT 42.0 36.1* 37.5* 38.9*  --  40.5*  --      Nutrition Orders:  Diet NPO  Tube Feedings/Formulas 250; Impact Peptide 1.5; Peg; 200; Every 4 hours    Appetite/Oral Intake: NPO/not  "applicable  Factors Affecting Nutritional Intake: reflux and vomiting  Social Needs Impacting Access to Food: none identified  Food/Jainism/Cultural Preferences: unable to obtain  Food Allergies: no known food allergies  Last Bowel Movement: 08/07/24  Wound(s):     Wound 08/05/24 1420 Moisture associated dermatitis Right Groin-Tissue loss description: Partial thickness       Wound 08/05/24 1420 Moisture associated dermatitis Left Groin-Tissue loss description: Partial thickness       Wound 08/05/24 1420 Pressure Injury Left Knee-Tissue loss description: Full thickness       Altered Skin Integrity 02/26/24 1100 lower Buttocks Moisture associated dermatitis-Tissue loss description: Partial thickness     Comments    8/5/24 pt on home tube feeding, weight appears stable in EMR     8/8/24 nurse reports pt having difficulty tolerating tube feeding, regurgitating formula, not able to stay upright and slumps over frequently; will try bolus feeds with pt staying upright for about an hour after feeding    8/9/24 nurse reports so far tolerating bolus feeding    Anthropometrics    Height: 5' 10" (177.8 cm), Height Method: Estimated  Last Weight: 117.9 kg (260 lb) (08/03/24 1955), Weight Method: Estimated  BMI (Calculated): 37.3  BMI Classification: obese grade II (BMI 35-39.9)        Ideal Body Weight (IBW), Male: 166 lb     % Ideal Body Weight, Male (lb): 156.63 %                          Usual Weight Provided By: EMR weight history    Wt Readings from Last 5 Encounters:   08/03/24 117.9 kg (260 lb)   07/18/24 117.9 kg (260 lb)   06/25/24 90.7 kg (200 lb)   05/15/24 90.7 kg (200 lb)   02/25/24 95.6 kg (210 lb 12.2 oz)     Weight Change(s) Since Admission:   Wt Readings from Last 1 Encounters:   08/03/24 1955 117.9 kg (260 lb)   Admit Weight: 117.9 kg (260 lb) (08/03/24 1955), Weight Method: Estimated    Estimated Needs    Weight Used For Calorie Calculations: 117.9 kg (259 lb 14.8 oz)  Energy Calorie Requirements (kcal): " 2346 kcal (1.2 stress factor)  Energy Need Method: [place] Jeor  Weight Used For Protein Calculations: 117.9 kg (259 lb 14.8 oz)  Protein Requirements: 141gm (1.2 gm/kg)  Fluid Requirements (mL): 2346 1ml/kcal)        Enteral Nutrition     Formula: Impact Peptide 1.5 [place]  Rate/Volume: 250ml 5-6 times per day bolus  Water Flushes: 200ml every 4h  Additives/Modulars: none at this time  Route: PEG tube  Method: bolus  Total Nutrition Provided by Tube Feeding, Additives, and Flushes:  Calories Provided  2250 kcal/d, 96% needs   Protein Provided  141 g/d, 100% needs   Fluid Provided  2358 ml/d, 100% needs   Continuous feeding calculations based on estimated 20 hr/d run time unless otherwise stated.    Parenteral Nutrition     Patient not receiving parenteral nutrition support at this time.    Evaluation of Received Nutrient Intake    Calories: meeting estimated needs  Protein: meeting estimated needs    Patient Education     Not applicable.    Nutrition Diagnosis     PES: Increased nutrient needs (protein) related to increased protein energy demand for wound healing as evidenced by multiple wounds/pressure injury. (active)         Nutrition Interventions     Intervention(s): collaboration with other providers    Goal: Meet greater than 80% of nutritional needs by follow-up. (goal progressing)  Goal: Maintain weight throughout hospitalization. (goal progressing)    Nutrition Goals & Monitoring     Dietitian will monitor: energy intake, enteral nutrition intake, and weight change  Discharge planning: too early to determine; pending clinical course  Nutrition Risk/Follow-Up: high (follow-up in 1-4 days)   Please consult if re-assessment needed sooner.

## 2024-08-09 NOTE — NURSING
Nurses Note -- 4 Eyes      8/9/2024   12:04 AM      Skin assessed during: Q Shift Change      [] No Altered Skin Integrity Present    []Prevention Measures Documented      [x] Yes- Altered Skin Integrity Present or Discovered   [] LDA Added if Not in Epic (Describe Wound)   [] New Altered Skin Integrity was Present on Admit and Documented in LDA   [] Wound Image Taken    Wound Care Consulted? Yes    Attending Nurse:  Dora Johnson RN/Staff Member:   Viktoria

## 2024-08-10 VITALS
TEMPERATURE: 99 F | OXYGEN SATURATION: 96 % | WEIGHT: 260 LBS | SYSTOLIC BLOOD PRESSURE: 146 MMHG | DIASTOLIC BLOOD PRESSURE: 93 MMHG | HEIGHT: 70 IN | RESPIRATION RATE: 17 BRPM | HEART RATE: 96 BPM | BODY MASS INDEX: 37.22 KG/M2

## 2024-08-10 PROBLEM — R11.2 NAUSEA AND VOMITING: Status: ACTIVE | Noted: 2024-08-10

## 2024-08-10 PROBLEM — I48.91 ATRIAL FIBRILLATION WITH RVR: Status: RESOLVED | Noted: 2023-08-26 | Resolved: 2024-08-10

## 2024-08-10 PROBLEM — T17.918A ASPIRATION OF GASTRIC CONTENTS: Status: RESOLVED | Noted: 2024-07-19 | Resolved: 2024-08-10

## 2024-08-10 PROBLEM — R07.9 CHEST PAIN: Status: ACTIVE | Noted: 2024-08-10

## 2024-08-10 PROBLEM — R11.10 VOMITING: Status: ACTIVE | Noted: 2024-08-10

## 2024-08-10 PROBLEM — K20.90 ESOPHAGITIS: Status: ACTIVE | Noted: 2024-08-10

## 2024-08-10 PROBLEM — R09.02 HYPOXIA: Status: ACTIVE | Noted: 2024-08-10

## 2024-08-10 PROBLEM — N30.00 ACUTE CYSTITIS WITHOUT HEMATURIA: Status: RESOLVED | Noted: 2024-07-21 | Resolved: 2024-08-10

## 2024-08-10 PROBLEM — T17.908A ASPIRATION INTO AIRWAY: Status: ACTIVE | Noted: 2024-08-10

## 2024-08-10 PROBLEM — J96.01 ACUTE HYPOXEMIC RESPIRATORY FAILURE: Status: RESOLVED | Noted: 2024-01-12 | Resolved: 2024-08-10

## 2024-08-10 LAB
ANION GAP SERPL CALC-SCNC: 8 MEQ/L
BUN SERPL-MCNC: 22.8 MG/DL (ref 8.4–25.7)
CALCIUM SERPL-MCNC: 8.8 MG/DL (ref 8.8–10)
CHLORIDE SERPL-SCNC: 106 MMOL/L (ref 98–107)
CO2 SERPL-SCNC: 28 MMOL/L (ref 23–31)
CREAT SERPL-MCNC: 0.69 MG/DL (ref 0.73–1.18)
CREAT/UREA NIT SERPL: 33
GFR SERPLBLD CREATININE-BSD FMLA CKD-EPI: >60 ML/MIN/1.73/M2
GLUCOSE SERPL-MCNC: 133 MG/DL (ref 82–115)
MAGNESIUM SERPL-MCNC: 2 MG/DL (ref 1.6–2.6)
POTASSIUM SERPL-SCNC: 3.3 MMOL/L (ref 3.5–5.1)
SODIUM SERPL-SCNC: 142 MMOL/L (ref 136–145)

## 2024-08-10 PROCEDURE — 27200966 HC CLOSED SUCTION SYSTEM

## 2024-08-10 PROCEDURE — 99900035 HC TECH TIME PER 15 MIN (STAT)

## 2024-08-10 PROCEDURE — A4216 STERILE WATER/SALINE, 10 ML: HCPCS | Performed by: INTERNAL MEDICINE

## 2024-08-10 PROCEDURE — 25000003 PHARM REV CODE 250: Performed by: INTERNAL MEDICINE

## 2024-08-10 PROCEDURE — 02HV33Z INSERTION OF INFUSION DEVICE INTO SUPERIOR VENA CAVA, PERCUTANEOUS APPROACH: ICD-10-PCS | Performed by: INTERNAL MEDICINE

## 2024-08-10 PROCEDURE — 25000003 PHARM REV CODE 250: Performed by: STUDENT IN AN ORGANIZED HEALTH CARE EDUCATION/TRAINING PROGRAM

## 2024-08-10 PROCEDURE — 63600175 PHARM REV CODE 636 W HCPCS: Performed by: STUDENT IN AN ORGANIZED HEALTH CARE EDUCATION/TRAINING PROGRAM

## 2024-08-10 PROCEDURE — 25000242 PHARM REV CODE 250 ALT 637 W/ HCPCS: Performed by: INTERNAL MEDICINE

## 2024-08-10 PROCEDURE — 83735 ASSAY OF MAGNESIUM: CPT | Performed by: INTERNAL MEDICINE

## 2024-08-10 PROCEDURE — 94640 AIRWAY INHALATION TREATMENT: CPT

## 2024-08-10 PROCEDURE — 25000003 PHARM REV CODE 250

## 2024-08-10 PROCEDURE — 99900022

## 2024-08-10 PROCEDURE — 99900031 HC PATIENT EDUCATION (STAT)

## 2024-08-10 PROCEDURE — 94761 N-INVAS EAR/PLS OXIMETRY MLT: CPT

## 2024-08-10 PROCEDURE — 80048 BASIC METABOLIC PNL TOTAL CA: CPT | Performed by: INTERNAL MEDICINE

## 2024-08-10 PROCEDURE — 99900026 HC AIRWAY MAINTENANCE (STAT)

## 2024-08-10 PROCEDURE — 36415 COLL VENOUS BLD VENIPUNCTURE: CPT | Performed by: INTERNAL MEDICINE

## 2024-08-10 RX ORDER — FERROUS SULFATE 300 MG/5ML
300 LIQUID (ML) ORAL DAILY
Status: DISCONTINUED | OUTPATIENT
Start: 2024-08-10 | End: 2024-08-10 | Stop reason: HOSPADM

## 2024-08-10 RX ORDER — POTASSIUM CHLORIDE 3 G/15ML
40 SOLUTION ORAL 2 TIMES DAILY
Qty: 90 ML | Refills: 0 | Status: SHIPPED | OUTPATIENT
Start: 2024-08-10 | End: 2024-08-13

## 2024-08-10 RX ORDER — FERROUS SULFATE 300 MG/5ML
300 LIQUID (ML) ORAL DAILY
Qty: 450 ML | Refills: 0 | Status: SHIPPED | OUTPATIENT
Start: 2024-08-10 | End: 2024-11-08

## 2024-08-10 RX ORDER — PANTOPRAZOLE SODIUM 40 MG/1
40 TABLET, DELAYED RELEASE ORAL DAILY
Status: DISCONTINUED | OUTPATIENT
Start: 2024-08-10 | End: 2024-08-10 | Stop reason: HOSPADM

## 2024-08-10 RX ADMIN — Medication: at 08:08

## 2024-08-10 RX ADMIN — FERROUS SULFATE 300 MG: 300 SOLUTION ORAL at 11:08

## 2024-08-10 RX ADMIN — DILTIAZEM HYDROCHLORIDE 60 MG: 60 TABLET, FILM COATED ORAL at 06:08

## 2024-08-10 RX ADMIN — SODIUM CHLORIDE, PRESERVATIVE FREE 10 ML: 5 INJECTION INTRAVENOUS at 11:08

## 2024-08-10 RX ADMIN — QUETIAPINE FUMARATE 25 MG: 25 TABLET ORAL at 08:08

## 2024-08-10 RX ADMIN — ATORVASTATIN CALCIUM 10 MG: 10 TABLET, FILM COATED ORAL at 08:08

## 2024-08-10 RX ADMIN — FINASTERIDE 5 MG: 5 TABLET, FILM COATED ORAL at 08:08

## 2024-08-10 RX ADMIN — LEVETIRACETAM 1500 MG: 100 SOLUTION ORAL at 08:08

## 2024-08-10 RX ADMIN — DOXYCYCLINE 100 MG: 100 INJECTION, POWDER, LYOPHILIZED, FOR SOLUTION INTRAVENOUS at 03:08

## 2024-08-10 RX ADMIN — CEFTRIAXONE SODIUM 2 G: 2 INJECTION, POWDER, FOR SOLUTION INTRAMUSCULAR; INTRAVENOUS at 11:08

## 2024-08-10 RX ADMIN — SCOPOLAMINE 1 PATCH: 1 PATCH TRANSDERMAL at 11:08

## 2024-08-10 RX ADMIN — METOPROLOL TARTRATE 100 MG: 50 TABLET, FILM COATED ORAL at 08:08

## 2024-08-10 RX ADMIN — DOXYCYCLINE 100 MG: 100 INJECTION, POWDER, LYOPHILIZED, FOR SOLUTION INTRAVENOUS at 12:08

## 2024-08-10 RX ADMIN — DILTIAZEM HYDROCHLORIDE 60 MG: 60 TABLET, FILM COATED ORAL at 01:08

## 2024-08-10 RX ADMIN — IPRATROPIUM BROMIDE AND ALBUTEROL SULFATE 3 ML: 2.5; .5 SOLUTION RESPIRATORY (INHALATION) at 08:08

## 2024-08-10 RX ADMIN — PANTOPRAZOLE SODIUM 40 MG: 40 TABLET, DELAYED RELEASE ORAL at 11:08

## 2024-08-10 RX ADMIN — DILTIAZEM HYDROCHLORIDE 60 MG: 60 TABLET, FILM COATED ORAL at 11:08

## 2024-08-10 RX ADMIN — LORAZEPAM 1 MG: 1 TABLET ORAL at 08:08

## 2024-08-10 RX ADMIN — SODIUM CHLORIDE, PRESERVATIVE FREE 10 ML: 5 INJECTION INTRAVENOUS at 03:08

## 2024-08-10 RX ADMIN — BUSPIRONE HYDROCHLORIDE 5 MG: 5 TABLET ORAL at 08:08

## 2024-08-10 RX ADMIN — IPRATROPIUM BROMIDE AND ALBUTEROL SULFATE 3 ML: 2.5; .5 SOLUTION RESPIRATORY (INHALATION) at 12:08

## 2024-08-10 NOTE — NURSING
Patient stable upon discharge. Report given to Anjana at [facility] prior to discharge. Patient left via [facility] transport.

## 2024-08-10 NOTE — DISCHARGE SUMMARY
[facility] Medicine Discharge Summary    Admit Date: 8/3/2024  Discharge Date and Time: 8/10/87113:56 AM  Admitting Physician:  Team  Discharging Physician: Bryon Maki MD.  Primary Care Physician: Jl Briones MD  Consults: Cardiology and Gastroenterology    Discharge Diagnoses:  Acute Hypoxemic respiratory failure 2/2 vomiting /aspiration(lowest 02 sat 91%  Uti(poa)klebsiella pneumonia   Sepsis 2/2 above  Reflux esophagitis  by imaging   Atrial Fibrillation with RVR   - now controlled  - on doac   Iron deficiency anemia with low iron / low iron sat   HTN   HLD   CAD   Hx of CVA with left-sided deficits, tracheostomy, peg   Hepatic steatosis   Diverticulosis   BPH   Hypokalemia          Continued on diltiazem 60 mg q.6, metoprolol tartrate 100 mg b.i.d.   DuoNebs q.4         Hospital Course:   69 y.o.  male with a past medical history of hypertension, hyperlipidemia, coronary artery disease, atrial fibrillation on Xarelto, CVA with left-sided deficits, trach and PEG dependent, liver steatosis, diverticulosis, BPH and resident of [facility]. The patient presented to [facility] on 8/3/2024 with a primary complaint of vomiting from his trach.     While in route EMS reported patient vomiting from his mouth. He went into AFib RVR in which he was given 20 mg of Cardizem, 4 mg of Zofran and a 250 mL bolus prior to arrival to the ED. upon presentation to the ED temperature 98.8° F, reaching a max of 100.5, , blood pressure 143/90, respiratory rate 25 and SpO2 100% on room air.  Labs with H&H 13.4/42, potassium 3.4, alkaline phosphatase 175, troponin 0.016, lactic acid 2.4.  Influenza A/B, RSV and SARS-COV-2 PCR negative.  UA with trace mucus, no squamous epithelial cells, trace bacteria, 11-20 WBCs, greater than 100 RBCs, 25 leukocyte esterase, 2+ blood and 2+ protein.  EKG atrial fibrillation with rapid ventricular rate of 118.   Chest x-ray with right infra hilar lower lung zone atelectasis and possible small left pleural effusion.  CT abdomen pelvis prominent distal esophageal wall thickening suggestive of esophagitis but no otherwise acute findings.  In ED patient received DuoNebs, Cardizem, Reglan, Zofran, Protonix and Zosyn.  Patient was admitted to hospital medicine services for further medical management.  Continued on IV Zosyn, IV Solu-Medrol, DuoNebs.  Blood cultures negative.  Urine culture showing GNRs.  Blood cultures remain negative.  Cardiology on board for AF/RVR; follow recommendations. IV Cardizem discontinued /placed on cardizem po on 08/05 with well controlled HR. CIS signed off on 08/06.  Patient continues to have episodes of bringing up feeds given through PEG even at low rates of 20-30 mL/hour.  GI consulted for reflux esophagitis leading to patient not being able to tolerate tube feeds via PEG. Urine culture growing Klebsiella sensitive to Rocephin. C continue on Protonix 40 mg IV q.12 hours with slow improvement.  We were able to titrate tube feedings up.  Electrolytes were replaced.  No further nausea or vomiting.  Tube feedings were switched to bolus feedings again without any nausea or vomiting.  Heart rate stable at 106.  Potassium 2.9 replace IV up to 3.3.  The patient has completed 7 days of IV antibiotics for urinary tract infection with Klebsiella present on admission.  We will make arrangements for discharge back to the nursing home on potassium replacement for the next 3 days.    Patient discharged on Prevacid elix 30 mg via PEG tube x8 weeks.  Ferrous sulfate elix 300 mg via PEG tube daily x 90 days   Potassium chloride elix 40 mEq via PEG tube twice a day for 3 days       .    Pt was seen and examined on the day of discharge  Vitals:  VITAL SIGNS: 24 HRS MIN & MAX LAST   Temp  Min: 97.1 °F (36.2 °C)  Max: 99.2 °F (37.3 °C) 98.4 °F (36.9 °C)   BP  Min: 114/81  Max: 177/139 (!) 133/99   Pulse  Min: 60  Max:  118  60   Resp  Min: 15  Max: 24 15   SpO2  Min: 92 %  Max: 98 % 96 %       Physical Exam:  General: alert male lying comfortably in bed, in no acute distress  HENT: oral and oropharyngeal mucosa moist, pink, with no erythema or exudates, no ear pain or discharge  Neck:  Tracheostomy in place; normal neck movement, no lymph nodes or swellings, no JVD or Carotid bruit  Respiratory: clear breathing sounds bilaterally, no crackles, rales, ronchi or wheezes  Cardiovascular: IRR w cvr   Peripheral Vascular: no lesions, ulcers or erosions, normal peripheral pulses and no pedal edema  Gastrointestinal:  PEG in place; soft, non-tender, non-distended abdomen, no guarding, rigidity or rebound tenderness, normal bowel sounds  Integumentary: normal skin color, no rashes or lesions  Neuro: AAO x 3; left hemiparesis   sensation intact to gross and fine touch B/L; CN II-XII grossly intact    Procedures Performed: No admission procedures for hospital encounter.     Significant Diagnostic Studies: See Full reports for all details    Recent Labs   Lab 08/05/24  0501 08/06/24  0401 08/07/24  1327   WBC 4.78 7.41 8.39   RBC 4.14* 4.63* 4.78   HGB 10.9* 12.1* 12.5*   HCT 37.5* 38.9* 40.5*   MCV 90.6 84.0 84.7   MCH 26.3* 26.1* 26.2*   MCHC 29.1* 31.1* 30.9*   RDW 15.9 15.7 15.6    266 248   MPV 9.8 10.0 10.1       Recent Labs   Lab 08/04/24  1345 08/05/24  0501 08/06/24  0401 08/07/24  1057 08/09/24  0618 08/10/24  0354   NA  --  142 143 142 146* 142   K  --  3.4* 3.4* 4.6 2.9* 3.3*   CL  --  107 108* 106 108* 106   CO2  --  26 25 21* 27 28   BUN  --  11.1 16.3 19.0 24.0 22.8   CREATININE  --  0.73 0.72* 0.77 0.71* 0.69*   CALCIUM  --  8.6* 8.5* 8.6* 8.6* 8.8   PH 7.660*  --   --   --   --   --    MG  --  2.30 2.50 2.50  --  2.00   ALBUMIN  --  3.1* 3.2* 3.4  --   --    ALKPHOS  --  147 146 142  --   --    ALT  --  20 22 25  --   --    AST  --  19 19 40*  --   --    BILITOT  --  0.8 0.5 0.5  --   --         Microbiology Results  (last 7 days)       Procedure Component Value Units Date/Time    Blood culture #1 **CANNOT BE ORDERED STAT** [[id number]]  (Normal) Collected: 08/03/24 2156    Order Status: Completed Specimen: Blood Updated: 08/08/24 2300     Blood Culture No Growth at 5 days    Blood culture #2 **CANNOT BE ORDERED STAT** [[id number]]  (Normal) Collected: 08/03/24 2156    Order Status: Completed Specimen: Blood Updated: 08/08/24 2300     Blood Culture No Growth at 5 days    Respiratory Culture [[id number]] Collected: 08/04/24 1242    Order Status: Completed Specimen: Respiratory from Trachael Aspirate Updated: 08/06/24 0732     Respiratory Culture Normal respiratory niyah     GRAM STAIN Quality 3+      Many Gram Positive Rods      Rare Gram Negative Rods    Urine culture [[id number]]  (Abnormal)  (Susceptibility) Collected: 08/04/24 0240    Order Status: Completed Specimen: Urine Updated: 08/06/24 0647     Urine Culture >/= 100,000 colonies/ml Klebsiella pneumoniae ssp pneumoniae             X-Ray Chest 1 View  Narrative: EXAMINATION:  XR CHEST 1 VIEW    CPT 96763    CLINICAL HISTORY:  Sob;    COMPARISON:  August 3, 2024    FINDINGS:  Examination reveals cardiomediastinal silhouette and pleuroparenchymal changes are essentially unchanged as compared with the previous exam  Impression: No significant change    Electronically signed by: Vik Keen  Date:    08/06/2024  Time:    14:06         Medication List        ASK your doctor about these medications      ascorbic Acid 500 mg Cpsr  Commonly known as: VITAMIN C     busPIRone 5 MG Tab  Commonly known as: BUSPAR     cholestyramine 4 gram packet  Commonly known as: QUESTRAN     cholestyramine-aspartame 4 gram Pwpk  Commonly known as: QUESTRAN LIGHT  1 packet (4 g total) by Per G Tube route 2 (two) times daily.     cloNIDine 0.1 MG tablet  Commonly known as: CATAPRES     diltiaZEM 60 MG tablet  Commonly known as: CARDIZEM     famotidine 20 MG tablet  Commonly known as: PEPCID  1  tablet (20 mg total) by Per G Tube route 2 (two) times daily.     finasteride 5 mg tablet  Commonly known as: PROSCAR  Take 1 tablet (5 mg total) by mouth once daily.     FLORANEX ORAL     furosemide 80 MG tablet  Commonly known as: LASIX     hydrALAZINE 50 MG tablet  Commonly known as: APRESOLINE     levetiracetam 500 mg/5 mL (5 mL) Soln     LIPITOR 10 mg tablet  Generic drug: atorvastatin     losartan 100 MG tablet  Commonly known as: COZAAR     metoprolol tartrate 100 MG tablet  Commonly known as: LOPRESSOR     multivitamin per tablet  Commonly known as: THERAGRAN     PROMOD PROTEIN ORAL     QUEtiapine 25 MG Tab  Commonly known as: SEROQUEL     scopolamine 1.3-1.5 mg (1 mg over 3 days)  Commonly known as: TRANSDERM-SCOP     tamsulosin 0.4 mg Cap  Commonly known as: FLOMAX  Take 1 capsule (0.4 mg total) by mouth every evening.     venlafaxine 75 mg Tr24     XARELTO 20 mg Tab  Generic drug: rivaroxaban               Explained in detail to the patient about the discharge plan, medications, and follow-up visits. Pt understands and agrees with the treatment plan  Discharge Disposition: nursing home resident   Discharged Condition: stable  Diet- bolus feedings   Dietary Orders (From admission, onward)       Start     Ordered    08/09/24 1230  Tube Feedings/Formulas 250; Impact Peptide 1.5; Peg; 200; Every 4 hours  (Tube Feedings/Formulas Order Panel)  Per comments (dietary)        Comments: Bolus 250 ml 5-6 times per day (0600, 0900, 1200, 1500, 1800, 2100), may run pump at max rate. May spread bolus feeds and water flushes out as best tolerated by pt.   Question Answer Comment   Feeding Volume (mL): 250    Select Formula: Impact Peptide 1.5    Route: Peg    Free water flush volume (mL): 200    Free water flush frequency: Every 4 hours        08/09/24 1231    08/04/24 0615  Diet NPO  (Diet/Nutrition - [facility])  Diet effective now         08/04/24 0618                   Medications Per DC med rec  Activities as  tolerated    For further questions contact hospitalist office    Discharge time 33 minutes    For worsening symptoms, chest pain, shortness of breath, increased abdominal pain, high grade fever, stroke or stroke like symptoms, immediately go to the nearest Emergency Room or call 911 as soon as possible.      Bryon Felipe M.D, on 8/10/2024. at 8:56 AM.

## 2024-08-10 NOTE — PLAN OF CARE
Discharge packet sent to [facility] via [facility], [facility]

## 2024-08-10 NOTE — PLAN OF CARE
08/10/24 1037   Final Note   Assessment Type Final Discharge Note   Anticipated Discharge Disposition [facility]   Post-Acute Status   Discharge Delays None known at this time

## 2024-08-10 NOTE — NURSING
Nurses Note -- 4 Eyes      8/10/2024   7:34 AM      Skin assessed during: Q Shift Change      [] No Altered Skin Integrity Present    []Prevention Measures Documented      [x] Yes- Altered Skin Integrity Present or Discovered   [] LDA Added if Not in Epic (Describe Wound)   [] New Altered Skin Integrity was Present on Admit and Documented in LDA   [] Wound Image Taken    Wound Care Consulted? Yes    Attending Nurse:  Viktoria FAN    Second RN/Staff Member:   Laly AFN

## 2024-08-10 NOTE — NURSING
Nurses Note -- 4 Eyes      8/9/2024   8:54 PM      Skin assessed during: Q Shift Change      [] No Altered Skin Integrity Present    []Prevention Measures Documented      [x] Yes- Altered Skin Integrity Present or Discovered   [x] LDA Added if Not in Epic (Describe Wound)   [] New Altered Skin Integrity was Present on Admit and Documented in LDA   [] Wound Image Taken    Wound Care Consulted? Yes    Attending Nurse:  Jing Johnson RN/Staff Member:   Dora

## 2024-08-10 NOTE — DISCHARGE INSTRUCTIONS
1-please follow up with your physician  2- take medications as indicated   3- keep bed elevated at greater than 30 degrees

## 2024-08-19 ENCOUNTER — HOSPITAL ENCOUNTER (INPATIENT)
Facility: HOSPITAL | Age: 69
LOS: 4 days | DRG: 208 | End: 2024-08-23
Attending: EMERGENCY MEDICINE | Admitting: INTERNAL MEDICINE
Payer: MEDICARE

## 2024-08-19 DIAGNOSIS — I48.91 A-FIB: ICD-10-CM

## 2024-08-19 DIAGNOSIS — R05.9 COUGH: ICD-10-CM

## 2024-08-19 DIAGNOSIS — R07.9 CHEST PAIN: ICD-10-CM

## 2024-08-19 DIAGNOSIS — J18.9 PNEUMONIA DUE TO INFECTIOUS ORGANISM, UNSPECIFIED LATERALITY, UNSPECIFIED PART OF LUNG: ICD-10-CM

## 2024-08-19 DIAGNOSIS — K92.2 GASTROINTESTINAL HEMORRHAGE, UNSPECIFIED GASTROINTESTINAL HEMORRHAGE TYPE: Primary | ICD-10-CM

## 2024-08-19 LAB
ALBUMIN SERPL-MCNC: 3.6 G/DL (ref 3.4–4.8)
ALBUMIN/GLOB SERPL: 0.9 RATIO (ref 1.1–2)
ALLENS TEST BLOOD GAS (OHS): YES
ALP SERPL-CCNC: 152 UNIT/L (ref 40–150)
ALT SERPL-CCNC: 26 UNIT/L (ref 0–55)
ANION GAP SERPL CALC-SCNC: 16 MEQ/L
APTT PPP: 27 SECONDS (ref 23.2–33.7)
AST SERPL-CCNC: 31 UNIT/L (ref 5–34)
BASE EXCESS BLD CALC-SCNC: 5.4 MMOL/L (ref -2–2)
BASOPHILS # BLD AUTO: 0.06 X10(3)/MCL
BASOPHILS # BLD AUTO: 0.09 X10(3)/MCL
BASOPHILS NFR BLD AUTO: 0.4 %
BASOPHILS NFR BLD AUTO: 0.5 %
BILIRUB SERPL-MCNC: 1.2 MG/DL
BLOOD GAS SAMPLE TYPE (OHS): ABNORMAL
BNP BLD-MCNC: 249.1 PG/ML
BUN SERPL-MCNC: 19.3 MG/DL (ref 8.4–25.7)
CA-I BLD-SCNC: 1.16 MMOL/L (ref 1.12–1.23)
CALCIUM SERPL-MCNC: 9.3 MG/DL (ref 8.8–10)
CHLORIDE SERPL-SCNC: 99 MMOL/L (ref 98–107)
CO2 BLDA-SCNC: 43.7 MMOL/L
CO2 SERPL-SCNC: 27 MMOL/L (ref 23–31)
COHGB MFR BLDA: 2.3 % (ref 0.5–1.5)
CREAT SERPL-MCNC: 0.88 MG/DL (ref 0.73–1.18)
CREAT/UREA NIT SERPL: 22
DRAWN BY BLOOD GAS (OHS): ABNORMAL
EOSINOPHIL # BLD AUTO: 0.04 X10(3)/MCL (ref 0–0.9)
EOSINOPHIL # BLD AUTO: 0.08 X10(3)/MCL (ref 0–0.9)
EOSINOPHIL NFR BLD AUTO: 0.2 %
EOSINOPHIL NFR BLD AUTO: 0.4 %
ERYTHROCYTE [DISTWIDTH] IN BLOOD BY AUTOMATED COUNT: 16 % (ref 11.5–17)
ERYTHROCYTE [DISTWIDTH] IN BLOOD BY AUTOMATED COUNT: 16.1 % (ref 11.5–17)
FLUAV AG UPPER RESP QL IA.RAPID: NOT DETECTED
FLUBV AG UPPER RESP QL IA.RAPID: NOT DETECTED
GFR SERPLBLD CREATININE-BSD FMLA CKD-EPI: >60 ML/MIN/1.73/M2
GLOBULIN SER-MCNC: 4 GM/DL (ref 2.4–3.5)
GLUCOSE SERPL-MCNC: 92 MG/DL (ref 82–115)
HCO3 BLDA-SCNC: 39.7 MMOL/L (ref 22–26)
HCT VFR BLD AUTO: 45.3 % (ref 42–52)
HCT VFR BLD AUTO: 45.9 % (ref 42–52)
HGB BLD-MCNC: 13 G/DL (ref 14–18)
HGB BLD-MCNC: 14.6 G/DL (ref 14–18)
IMM GRANULOCYTES # BLD AUTO: 0.08 X10(3)/MCL (ref 0–0.04)
IMM GRANULOCYTES # BLD AUTO: 0.11 X10(3)/MCL (ref 0–0.04)
IMM GRANULOCYTES NFR BLD AUTO: 0.4 %
IMM GRANULOCYTES NFR BLD AUTO: 0.7 %
INR PPP: 1.4
LACTATE SERPL-SCNC: 2.1 MMOL/L (ref 0.5–2.2)
LACTATE SERPL-SCNC: 2.6 MMOL/L (ref 0.5–2.2)
LPM (OHS): 4
LYMPHOCYTES # BLD AUTO: 1.3 X10(3)/MCL (ref 0.6–4.6)
LYMPHOCYTES # BLD AUTO: 2.04 X10(3)/MCL (ref 0.6–4.6)
LYMPHOCYTES NFR BLD AUTO: 12.4 %
LYMPHOCYTES NFR BLD AUTO: 6.7 %
MAGNESIUM SERPL-MCNC: 2 MG/DL (ref 1.6–2.6)
MCH RBC QN AUTO: 26.4 PG (ref 27–31)
MCH RBC QN AUTO: 26.6 PG (ref 27–31)
MCHC RBC AUTO-ENTMCNC: 28.7 G/DL (ref 33–36)
MCHC RBC AUTO-ENTMCNC: 31.8 G/DL (ref 33–36)
MCV RBC AUTO: 82.9 FL (ref 80–94)
MCV RBC AUTO: 92.6 FL (ref 80–94)
METHGB MFR BLDA: 0.9 % (ref 0.4–1.5)
MONOCYTES # BLD AUTO: 0.69 X10(3)/MCL (ref 0.1–1.3)
MONOCYTES # BLD AUTO: 0.99 X10(3)/MCL (ref 0.1–1.3)
MONOCYTES NFR BLD AUTO: 4.2 %
MONOCYTES NFR BLD AUTO: 5.1 %
MRSA PCR SCRN (OHS): DETECTED
NEUTROPHILS # BLD AUTO: 13.51 X10(3)/MCL (ref 2.1–9.2)
NEUTROPHILS # BLD AUTO: 16.99 X10(3)/MCL (ref 2.1–9.2)
NEUTROPHILS NFR BLD AUTO: 82.1 %
NEUTROPHILS NFR BLD AUTO: 86.9 %
NRBC BLD AUTO-RTO: 0 %
NRBC BLD AUTO-RTO: 0 %
O2 HB BLOOD GAS (OHS): 96.6 % (ref 94–97)
OHS QRS DURATION: 80 MS
OHS QTC CALCULATION: 482 MS
OXYGEN DEVICE BLOOD GAS (OHS): ABNORMAL
OXYHGB MFR BLDA: 13.2 G/DL (ref 12–16)
PCO2 BLDA: >115 MMHG (ref 35–45)
PH BLDA: 7.09 [PH] (ref 7.35–7.45)
PLATELET # BLD AUTO: 187 X10(3)/MCL (ref 130–400)
PLATELET # BLD AUTO: 246 X10(3)/MCL (ref 130–400)
PMV BLD AUTO: 10.2 FL (ref 7.4–10.4)
PMV BLD AUTO: 10.6 FL (ref 7.4–10.4)
PO2 BLDA: 140 MMHG (ref 80–100)
POCT GLUCOSE: 127 MG/DL (ref 70–110)
POCT GLUCOSE: 129 MG/DL (ref 70–110)
POTASSIUM BLOOD GAS (OHS): 3.6 MMOL/L (ref 3.5–5)
POTASSIUM SERPL-SCNC: 3.7 MMOL/L (ref 3.5–5.1)
PROT SERPL-MCNC: 7.6 GM/DL (ref 5.8–7.6)
PROTHROMBIN TIME: 16.7 SECONDS (ref 12.5–14.5)
RBC # BLD AUTO: 4.89 X10(6)/MCL (ref 4.7–6.1)
RBC # BLD AUTO: 5.54 X10(6)/MCL (ref 4.7–6.1)
RSV A 5' UTR RNA NPH QL NAA+PROBE: NOT DETECTED
SAMPLE SITE BLOOD GAS (OHS): ABNORMAL
SAO2 % BLDA: 98.2 %
SARS-COV-2 RNA RESP QL NAA+PROBE: NOT DETECTED
SODIUM BLOOD GAS (OHS): 137 MMOL/L (ref 137–145)
SODIUM SERPL-SCNC: 142 MMOL/L (ref 136–145)
TROPONIN I SERPL-MCNC: 0.04 NG/ML (ref 0–0.04)
TSH SERPL-ACNC: 3.99 UIU/ML (ref 0.35–4.94)
WBC # BLD AUTO: 16.45 X10(3)/MCL (ref 4.5–11.5)
WBC # BLD AUTO: 19.53 X10(3)/MCL (ref 4.5–11.5)

## 2024-08-19 PROCEDURE — 99900035 HC TECH TIME PER 15 MIN (STAT)

## 2024-08-19 PROCEDURE — 96361 HYDRATE IV INFUSION ADD-ON: CPT

## 2024-08-19 PROCEDURE — 84443 ASSAY THYROID STIM HORMONE: CPT | Performed by: EMERGENCY MEDICINE

## 2024-08-19 PROCEDURE — 87040 BLOOD CULTURE FOR BACTERIA: CPT | Performed by: EMERGENCY MEDICINE

## 2024-08-19 PROCEDURE — 25500020 PHARM REV CODE 255: Performed by: INTERNAL MEDICINE

## 2024-08-19 PROCEDURE — 63600175 PHARM REV CODE 636 W HCPCS: Performed by: NURSE PRACTITIONER

## 2024-08-19 PROCEDURE — 20000000 HC ICU ROOM

## 2024-08-19 PROCEDURE — 25000003 PHARM REV CODE 250: Performed by: INTERNAL MEDICINE

## 2024-08-19 PROCEDURE — 83605 ASSAY OF LACTIC ACID: CPT | Performed by: EMERGENCY MEDICINE

## 2024-08-19 PROCEDURE — 93010 ELECTROCARDIOGRAM REPORT: CPT | Mod: ,,, | Performed by: STUDENT IN AN ORGANIZED HEALTH CARE EDUCATION/TRAINING PROGRAM

## 2024-08-19 PROCEDURE — 94760 N-INVAS EAR/PLS OXIMETRY 1: CPT

## 2024-08-19 PROCEDURE — 36600 WITHDRAWAL OF ARTERIAL BLOOD: CPT

## 2024-08-19 PROCEDURE — 83735 ASSAY OF MAGNESIUM: CPT

## 2024-08-19 PROCEDURE — 27000221 HC OXYGEN, UP TO 24 HOURS

## 2024-08-19 PROCEDURE — 85025 COMPLETE CBC W/AUTO DIFF WBC: CPT

## 2024-08-19 PROCEDURE — 80053 COMPREHEN METABOLIC PANEL: CPT | Performed by: EMERGENCY MEDICINE

## 2024-08-19 PROCEDURE — 83880 ASSAY OF NATRIURETIC PEPTIDE: CPT | Performed by: EMERGENCY MEDICINE

## 2024-08-19 PROCEDURE — 99900026 HC AIRWAY MAINTENANCE (STAT)

## 2024-08-19 PROCEDURE — 25000003 PHARM REV CODE 250: Performed by: NURSE PRACTITIONER

## 2024-08-19 PROCEDURE — 99285 EMERGENCY DEPT VISIT HI MDM: CPT | Mod: 25

## 2024-08-19 PROCEDURE — 82803 BLOOD GASES ANY COMBINATION: CPT

## 2024-08-19 PROCEDURE — 94002 VENT MGMT INPAT INIT DAY: CPT

## 2024-08-19 PROCEDURE — 25000003 PHARM REV CODE 250: Performed by: EMERGENCY MEDICINE

## 2024-08-19 PROCEDURE — 85610 PROTHROMBIN TIME: CPT | Performed by: EMERGENCY MEDICINE

## 2024-08-19 PROCEDURE — 96375 TX/PRO/DX INJ NEW DRUG ADDON: CPT

## 2024-08-19 PROCEDURE — 25000003 PHARM REV CODE 250

## 2024-08-19 PROCEDURE — 87641 MR-STAPH DNA AMP PROBE: CPT | Performed by: PHYSICIAN ASSISTANT

## 2024-08-19 PROCEDURE — 96368 THER/DIAG CONCURRENT INF: CPT

## 2024-08-19 PROCEDURE — 83735 ASSAY OF MAGNESIUM: CPT | Performed by: EMERGENCY MEDICINE

## 2024-08-19 PROCEDURE — 36415 COLL VENOUS BLD VENIPUNCTURE: CPT

## 2024-08-19 PROCEDURE — 0241U COVID/RSV/FLU A&B PCR: CPT | Performed by: EMERGENCY MEDICINE

## 2024-08-19 PROCEDURE — 63600175 PHARM REV CODE 636 W HCPCS: Performed by: EMERGENCY MEDICINE

## 2024-08-19 PROCEDURE — 80053 COMPREHEN METABOLIC PANEL: CPT

## 2024-08-19 PROCEDURE — 27100171 HC OXYGEN HIGH FLOW UP TO 24 HOURS

## 2024-08-19 PROCEDURE — 84484 ASSAY OF TROPONIN QUANT: CPT | Performed by: EMERGENCY MEDICINE

## 2024-08-19 PROCEDURE — 5A1945Z RESPIRATORY VENTILATION, 24-96 CONSECUTIVE HOURS: ICD-10-PCS | Performed by: INTERNAL MEDICINE

## 2024-08-19 PROCEDURE — 85730 THROMBOPLASTIN TIME PARTIAL: CPT | Performed by: EMERGENCY MEDICINE

## 2024-08-19 PROCEDURE — 96366 THER/PROPH/DIAG IV INF ADDON: CPT

## 2024-08-19 PROCEDURE — 93005 ELECTROCARDIOGRAM TRACING: CPT

## 2024-08-19 PROCEDURE — 85025 COMPLETE CBC W/AUTO DIFF WBC: CPT | Performed by: EMERGENCY MEDICINE

## 2024-08-19 PROCEDURE — 96365 THER/PROPH/DIAG IV INF INIT: CPT

## 2024-08-19 PROCEDURE — 84100 ASSAY OF PHOSPHORUS: CPT

## 2024-08-19 PROCEDURE — 83605 ASSAY OF LACTIC ACID: CPT

## 2024-08-19 RX ORDER — SUCRALFATE 1 G/1
1 TABLET ORAL
Status: DISCONTINUED | OUTPATIENT
Start: 2024-08-19 | End: 2024-08-23 | Stop reason: HOSPADM

## 2024-08-19 RX ORDER — ACETAMINOPHEN 10 MG/ML
1000 INJECTION, SOLUTION INTRAVENOUS ONCE
Status: COMPLETED | OUTPATIENT
Start: 2024-08-19 | End: 2024-08-19

## 2024-08-19 RX ORDER — POLYETHYLENE GLYCOL 3350 17 G/17G
17 POWDER, FOR SOLUTION ORAL 2 TIMES DAILY PRN
Status: DISCONTINUED | OUTPATIENT
Start: 2024-08-19 | End: 2024-08-23 | Stop reason: HOSPADM

## 2024-08-19 RX ORDER — TALC
6 POWDER (GRAM) TOPICAL NIGHTLY PRN
Status: DISCONTINUED | OUTPATIENT
Start: 2024-08-19 | End: 2024-08-23 | Stop reason: HOSPADM

## 2024-08-19 RX ORDER — QUETIAPINE FUMARATE 25 MG/1
25 TABLET, FILM COATED ORAL 2 TIMES DAILY
Status: DISCONTINUED | OUTPATIENT
Start: 2024-08-19 | End: 2024-08-23 | Stop reason: HOSPADM

## 2024-08-19 RX ORDER — TAMSULOSIN HYDROCHLORIDE 0.4 MG/1
0.4 CAPSULE ORAL NIGHTLY
Status: DISCONTINUED | OUTPATIENT
Start: 2024-08-19 | End: 2024-08-23 | Stop reason: HOSPADM

## 2024-08-19 RX ORDER — DIGOXIN 0.25 MG/ML
250 INJECTION INTRAMUSCULAR; INTRAVENOUS
Status: COMPLETED | OUTPATIENT
Start: 2024-08-19 | End: 2024-08-19

## 2024-08-19 RX ORDER — MUPIROCIN 20 MG/G
OINTMENT TOPICAL 2 TIMES DAILY
Status: DISCONTINUED | OUTPATIENT
Start: 2024-08-21 | End: 2024-08-23 | Stop reason: HOSPADM

## 2024-08-19 RX ORDER — CHOLESTYRAMINE 4 G/9G
1 POWDER, FOR SUSPENSION ORAL DAILY
Status: DISCONTINUED | OUTPATIENT
Start: 2024-08-19 | End: 2024-08-23 | Stop reason: HOSPADM

## 2024-08-19 RX ORDER — DILTIAZEM HYDROCHLORIDE 60 MG/1
60 TABLET, FILM COATED ORAL EVERY 6 HOURS
Status: DISCONTINUED | OUTPATIENT
Start: 2024-08-19 | End: 2024-08-23 | Stop reason: HOSPADM

## 2024-08-19 RX ORDER — LEVETIRACETAM 100 MG/ML
1500 SOLUTION ORAL 2 TIMES DAILY
Status: DISCONTINUED | OUTPATIENT
Start: 2024-08-19 | End: 2024-08-23 | Stop reason: HOSPADM

## 2024-08-19 RX ORDER — PANTOPRAZOLE SODIUM 40 MG/10ML
80 INJECTION, POWDER, LYOPHILIZED, FOR SOLUTION INTRAVENOUS
Status: COMPLETED | OUTPATIENT
Start: 2024-08-19 | End: 2024-08-19

## 2024-08-19 RX ORDER — NALOXONE HCL 0.4 MG/ML
0.02 VIAL (ML) INJECTION
Status: DISCONTINUED | OUTPATIENT
Start: 2024-08-19 | End: 2024-08-23 | Stop reason: HOSPADM

## 2024-08-19 RX ORDER — ONDANSETRON HYDROCHLORIDE 2 MG/ML
4 INJECTION, SOLUTION INTRAVENOUS EVERY 4 HOURS PRN
Status: DISCONTINUED | OUTPATIENT
Start: 2024-08-19 | End: 2024-08-23 | Stop reason: HOSPADM

## 2024-08-19 RX ORDER — PANTOPRAZOLE SODIUM 40 MG/10ML
40 INJECTION, POWDER, LYOPHILIZED, FOR SOLUTION INTRAVENOUS 2 TIMES DAILY
Status: DISCONTINUED | OUTPATIENT
Start: 2024-08-19 | End: 2024-08-23 | Stop reason: HOSPADM

## 2024-08-19 RX ORDER — ACETAMINOPHEN 325 MG/1
650 TABLET ORAL EVERY 6 HOURS PRN
Status: DISCONTINUED | OUTPATIENT
Start: 2024-08-19 | End: 2024-08-23 | Stop reason: HOSPADM

## 2024-08-19 RX ORDER — SCOLOPAMINE TRANSDERMAL SYSTEM 1 MG/1
1 PATCH, EXTENDED RELEASE TRANSDERMAL
Status: DISCONTINUED | OUTPATIENT
Start: 2024-08-19 | End: 2024-08-23 | Stop reason: HOSPADM

## 2024-08-19 RX ORDER — SIMETHICONE 80 MG
1 TABLET,CHEWABLE ORAL 4 TIMES DAILY PRN
Status: DISCONTINUED | OUTPATIENT
Start: 2024-08-19 | End: 2024-08-23 | Stop reason: HOSPADM

## 2024-08-19 RX ORDER — SODIUM CHLORIDE, SODIUM LACTATE, POTASSIUM CHLORIDE, CALCIUM CHLORIDE 600; 310; 30; 20 MG/100ML; MG/100ML; MG/100ML; MG/100ML
1000 INJECTION, SOLUTION INTRAVENOUS
Status: COMPLETED | OUTPATIENT
Start: 2024-08-19 | End: 2024-08-19

## 2024-08-19 RX ORDER — NOREPINEPHRINE BITARTRATE/D5W 8 MG/250ML
0-3 PLASTIC BAG, INJECTION (ML) INTRAVENOUS CONTINUOUS
Status: DISCONTINUED | OUTPATIENT
Start: 2024-08-20 | End: 2024-08-23 | Stop reason: HOSPADM

## 2024-08-19 RX ORDER — PROCHLORPERAZINE EDISYLATE 5 MG/ML
5 INJECTION INTRAMUSCULAR; INTRAVENOUS EVERY 6 HOURS PRN
Status: DISCONTINUED | OUTPATIENT
Start: 2024-08-19 | End: 2024-08-23 | Stop reason: HOSPADM

## 2024-08-19 RX ORDER — FINASTERIDE 5 MG/1
5 TABLET, FILM COATED ORAL DAILY
Status: DISCONTINUED | OUTPATIENT
Start: 2024-08-19 | End: 2024-08-23 | Stop reason: HOSPADM

## 2024-08-19 RX ORDER — VENLAFAXINE HYDROCHLORIDE 37.5 MG/1
75 CAPSULE, EXTENDED RELEASE ORAL DAILY
Status: DISCONTINUED | OUTPATIENT
Start: 2024-08-19 | End: 2024-08-22

## 2024-08-19 RX ORDER — NOREPINEPHRINE BITARTRATE/D5W 8 MG/250ML
PLASTIC BAG, INJECTION (ML) INTRAVENOUS
Status: COMPLETED
Start: 2024-08-19 | End: 2024-08-19

## 2024-08-19 RX ORDER — BUSPIRONE HYDROCHLORIDE 5 MG/1
5 TABLET ORAL 3 TIMES DAILY
Status: DISCONTINUED | OUTPATIENT
Start: 2024-08-19 | End: 2024-08-23 | Stop reason: HOSPADM

## 2024-08-19 RX ORDER — METOPROLOL TARTRATE 50 MG/1
100 TABLET ORAL 2 TIMES DAILY
Status: DISCONTINUED | OUTPATIENT
Start: 2024-08-19 | End: 2024-08-23 | Stop reason: HOSPADM

## 2024-08-19 RX ORDER — ALUMINUM HYDROXIDE, MAGNESIUM HYDROXIDE, AND SIMETHICONE 1200; 120; 1200 MG/30ML; MG/30ML; MG/30ML
30 SUSPENSION ORAL 4 TIMES DAILY PRN
Status: DISCONTINUED | OUTPATIENT
Start: 2024-08-19 | End: 2024-08-23 | Stop reason: HOSPADM

## 2024-08-19 RX ADMIN — VENLAFAXINE HYDROCHLORIDE 75 MG: 37.5 CAPSULE, EXTENDED RELEASE ORAL at 09:08

## 2024-08-19 RX ADMIN — LEVETIRACETAM 1500 MG: 100 SOLUTION ORAL at 09:08

## 2024-08-19 RX ADMIN — IOHEXOL 100 ML: 350 INJECTION, SOLUTION INTRAVENOUS at 06:08

## 2024-08-19 RX ADMIN — CHOLESTYRAMINE 4 G: 4 POWDER, FOR SUSPENSION ORAL at 12:08

## 2024-08-19 RX ADMIN — BUSPIRONE HYDROCHLORIDE 5 MG: 5 TABLET ORAL at 05:08

## 2024-08-19 RX ADMIN — SUCRALFATE 1 G: 1 TABLET ORAL at 05:08

## 2024-08-19 RX ADMIN — VANCOMYCIN HYDROCHLORIDE 1250 MG: 1.25 INJECTION, POWDER, LYOPHILIZED, FOR SOLUTION INTRAVENOUS at 06:08

## 2024-08-19 RX ADMIN — VANCOMYCIN HYDROCHLORIDE 1750 MG: 500 INJECTION, POWDER, LYOPHILIZED, FOR SOLUTION INTRAVENOUS at 03:08

## 2024-08-19 RX ADMIN — PIPERACILLIN SODIUM AND TAZOBACTAM SODIUM 4.5 G: 4; .5 INJECTION, POWDER, LYOPHILIZED, FOR SOLUTION INTRAVENOUS at 07:08

## 2024-08-19 RX ADMIN — QUETIAPINE FUMARATE 25 MG: 25 TABLET ORAL at 09:08

## 2024-08-19 RX ADMIN — DIGOXIN 250 MCG: 0.25 INJECTION INTRAMUSCULAR; INTRAVENOUS at 03:08

## 2024-08-19 RX ADMIN — PIPERACILLIN SODIUM AND TAZOBACTAM SODIUM 4.5 G: 4; .5 INJECTION, POWDER, LYOPHILIZED, FOR SOLUTION INTRAVENOUS at 11:08

## 2024-08-19 RX ADMIN — BUSPIRONE HYDROCHLORIDE 5 MG: 5 TABLET ORAL at 09:08

## 2024-08-19 RX ADMIN — ACETAMINOPHEN 1000 MG: 10 INJECTION, SOLUTION INTRAVENOUS at 03:08

## 2024-08-19 RX ADMIN — SODIUM CHLORIDE 2190 ML: 9 INJECTION, SOLUTION INTRAVENOUS at 01:08

## 2024-08-19 RX ADMIN — NOREPINEPHRINE BITARTRATE 8 MG: 8 INJECTION, SOLUTION INTRAVENOUS at 11:08

## 2024-08-19 RX ADMIN — DILTIAZEM HYDROCHLORIDE 60 MG: 60 TABLET, FILM COATED ORAL at 09:08

## 2024-08-19 RX ADMIN — SCOPOLAMINE 1 PATCH: 1 PATCH TRANSDERMAL at 12:08

## 2024-08-19 RX ADMIN — PANTOPRAZOLE SODIUM 40 MG: 40 INJECTION, POWDER, LYOPHILIZED, FOR SOLUTION INTRAVENOUS at 08:08

## 2024-08-19 RX ADMIN — FINASTERIDE 5 MG: 5 TABLET, FILM COATED ORAL at 09:08

## 2024-08-19 RX ADMIN — DILTIAZEM HYDROCHLORIDE 60 MG: 60 TABLET, FILM COATED ORAL at 05:08

## 2024-08-19 RX ADMIN — SODIUM CHLORIDE, POTASSIUM CHLORIDE, SODIUM LACTATE AND CALCIUM CHLORIDE 1000 ML: 600; 310; 30; 20 INJECTION, SOLUTION INTRAVENOUS at 05:08

## 2024-08-19 RX ADMIN — NOREPINEPHRINE BITARTRATE 0.02 MCG/KG/MIN: 8 INJECTION, SOLUTION INTRAVENOUS at 11:08

## 2024-08-19 RX ADMIN — METOPROLOL TARTRATE 100 MG: 50 TABLET, FILM COATED ORAL at 10:08

## 2024-08-19 RX ADMIN — PIPERACILLIN AND TAZOBACTAM 4.5 G: 4; .5 INJECTION, POWDER, LYOPHILIZED, FOR SOLUTION INTRAVENOUS; PARENTERAL at 02:08

## 2024-08-19 RX ADMIN — PANTOPRAZOLE SODIUM 80 MG: 40 INJECTION, POWDER, LYOPHILIZED, FOR SOLUTION INTRAVENOUS at 01:08

## 2024-08-19 RX ADMIN — SODIUM CHLORIDE, POTASSIUM CHLORIDE, SODIUM LACTATE AND CALCIUM CHLORIDE 1000 ML: 600; 310; 30; 20 INJECTION, SOLUTION INTRAVENOUS at 01:08

## 2024-08-19 NOTE — PROGRESS NOTES
"Pharmacokinetic Initial Assessment: IV Vancomycin    Assessment/Plan:    Initiate intravenous vancomycin with loading dose of 1750 mg once followed by a maintenance dose of vancomycin 1250 mg IV every 12 hours  Desired empiric serum trough concentration is 15 to 20 mcg/mL  Draw vancomycin trough level 60 min prior to fourth dose on 08/20 at approximately 1500  Pharmacy will continue to follow and monitor vancomycin.      Please contact pharmacy at extension [phone] with any questions regarding this assessment.     Thank you for the consult,   Mally Kaye       Patient brief summary:  Delio Daniel Jr. is a 69 y.o. male initiated on antimicrobial therapy with IV Vancomycin for treatment of suspected lower respiratory infection    Drug Allergies:   Review of patient's allergies indicates:  No Known Allergies    Actual Body Weight:   90.7 kg    Renal Function:   Estimated Creatinine Clearance: 91 mL/min (based on SCr of 0.88 mg/dL).,     Dialysis Method (if applicable):  N/A    CBC (last 72 hours):  Recent Labs   Lab Result Units 08/19/24  0204   WBC x10(3)/mcL 16.45*   Hgb g/dL 14.6   Hct % 45.9   Platelet x10(3)/mcL 246   Mono % % 4.2   Eos % % 0.2   Basophil % % 0.4       Metabolic Panel (last 72 hours):  Recent Labs   Lab Result Units 08/19/24  0204   Sodium mmol/L 142   Potassium mmol/L 3.7   Chloride mmol/L 99   CO2 mmol/L 27   Glucose mg/dL 92   Blood Urea Nitrogen mg/dL 19.3   Creatinine mg/dL 0.88   Albumin g/dL 3.6   Bilirubin Total mg/dL 1.2   ALP unit/L 152*   AST unit/L 31   ALT unit/L 26   Magnesium Level mg/dL 2.00       Drug levels (last 3 results):  No results for input(s): "VANCOMYCINRA", "VANCORANDOM", "VANCOMYCINPE", "VANCOPEAK", "VANCOMYCINTR", "VANCOTROUGH" in the last 72 hours.    Microbiologic Results:  Microbiology Results (last 7 days)       Procedure Component Value Units Date/Time    Blood culture #1 **CANNOT BE ORDERED STAT** [[id number]] Collected: 08/19/24 0231    Order Status: Resulted " Specimen: Blood Updated: 08/19/24 0240    Blood culture #2 **CANNOT BE ORDERED STAT** [[id number]] Collected: 08/19/24 0231    Order Status: Resulted Specimen: Blood Updated: 08/19/24 0240

## 2024-08-19 NOTE — PLAN OF CARE
Attempted to meet with patient but nonverbal. Attempted to contact pts mike Campos but no answer. Information for assessment obtained from records from recent hospitalization 8/3. Pt is resident of [facility].     08/19/24 1135   Discharge Assessment   Assessment Type Discharge Planning Assessment   Confirmed/corrected address, phone number and insurance Yes   Confirmed Demographics Correct on Facesheet   Source of Information health record  (attempted contact with daughter but no answer. Pt nonverbal)   Communicated EDER with patient/caregiver Date not available/Unable to determine   People in Home facility resident   Facility Arrived From: [facility]   Do you expect to return to your current living situation? Yes   Do you have help at home or someone to help you manage your care at home? Yes   Who are your caregiver(s) and their phone number(s)? Facility Resident. Daughter Beth Daniel [phone]   Prior to hospitilization cognitive status: Unable to Assess   Current cognitive status: Unable to Assess   Walking or Climbing Stairs Difficulty yes   Walking or Climbing Stairs transferring difficulty, dependent   Mobility Management Per notes, pt is bedbound with a Jacoby lift used by Staff members at [facility] for all transfers from bed to wheelchair/ Janice-chair PRN   Dressing/Bathing Difficulty yes   Dressing/Bathing bathing difficulty, dependent;dressing difficulty, dependent   Dressing/Bathing Management Per notes, bathers/ CNA staff at [facility] assist with his hygiene needs PRN   Home Accessibility wheelchair accessible   Home Layout Able to live on 1st floor   Equipment Currently Used at Home hospital bed;lift device;oxygen   Readmission within 30 days? Yes   Patient currently being followed by outpatient case management? No   Do you take prescription medications? Yes   Do you have prescription coverage? Yes   Coverage [employer]   Do you have any problems  affording any of your prescribed medications? TBD   Is the patient taking medications as prescribed? yes   Who is going to help you get home at discharge? NH/ ambulance   How do you get to doctors appointments? agency   Are you on dialysis? No   Do you take coumadin? No   Discharge Plan A Other  (TBD)   DME Needed Upon Discharge  other (see comments)  (TBD)   Discharge Plan discussed with:   (daughter did not answer)   Transition of Care Barriers None   OTHER   Name(s) of People in Home Resident at [facility]

## 2024-08-19 NOTE — CONSULTS
"Consult Note    Reason for Consult:      We were consulted to evaluate this patient for UGIB.    HPI:     68 y.o. male known to Dr. Escalona from inpatient care (primary GI is Dr. 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      Presented to ED from nursing home for concerns of dark vomiting from mouth and trach.  On presentation, patient tachycardic 132, hypotensive 78/52, and febrile 101.4.  Labs notable for leukocytosis 16,000, hemoglobin 14.5, INR 1.4, normal renal indices.  CT chest/abdomen/pelvis with IV contrast noted new bilateral lung infiltrates may represent developing infectious process, esophagus reported as unremarkable.  In the ED, patient had brownish vomitus" which tested positive for occult blood.  Patient was started on vanc, Zosyn, and PPI 40 mg IV b.i.d..  Patient was admitted.  GI was consulted.    Patient with multiple prior admissions during which our group was consulted for similar complaints of coffee-ground emesis and tube feed intolerance.  Most recent admission on 08/03 with tracheobronchitis infectious versus inflammatory from aspiration, sepsis, acute hypoxic respiratory failure, AFib with RVR. CT A/P noted prominent distal esophageal wall thickening suggestive of esophagitis.        History obtained via chart review and nurse report due to patient being nonverbal.  Per nurse, patient with no episodes of emesis today and attempted to suction trach nonproductive.       Previous records reviewed...  01/02/2024 EGD/PEG: relatively unremarkable exam, and a 24 FR peg was placed with external bumper at 4 cm.  Hx of anemia in the recent past with hgb as low as 9 but hgb around 12 within the last month.     PCP:  Jl Briones MD    Review of " patient's allergies indicates:  No Known Allergies     Current Facility-Administered Medications   Medication Dose Route Frequency Provider Last Rate Last Admin    acetaminophen tablet 650 mg  650 mg Oral Q6H PRN Tania Butts AGACNP-BC        aluminum-magnesium hydroxide-simethicone 200-200-20 mg/5 mL suspension 30 mL  30 mL Oral QID PRN Tania Butts AGACNP-BC        busPIRone tablet 5 mg  5 mg Per G Tube TID Juancarlos Mota MD   5 mg at 08/19/24 0956    cholestyramine 4 gram packet 4 g  1 packet Per G Tube Daily Juancarlos Mota MD        diltiaZEM tablet 60 mg  60 mg Per G Tube Q6H Juancarlos Mota MD   60 mg at 08/19/24 0957    finasteride tablet 5 mg  5 mg Oral Daily Juancarlos Mota MD   5 mg at 08/19/24 0957    levetiracetam 500 mg/5 mL (5 mL) liquid Soln 1,500 mg  1,500 mg Per G Tube BID Juancarlos Mota MD   1,500 mg at 08/19/24 0956    melatonin tablet 6 mg  6 mg Oral Nightly PRN Tania Butts AGACNP-BC        metoprolol tartrate (LOPRESSOR) tablet 100 mg  100 mg Per G Tube BID Juancarlos Mota MD   100 mg at 08/19/24 1002    [START ON 8/21/2024] mupirocin 2 % ointment   Nasal BID Sekou Contreras MD        naloxone 0.4 mg/mL injection 0.02 mg  0.02 mg Intravenous PRN Tania Butts AGACNP-BC        ondansetron injection 4 mg  4 mg Intravenous Q4H PRN Tania Butts AGACNP-BC        pantoprazole injection 40 mg  40 mg Intravenous BID Tania Butts AGACNP-BC   40 mg at 08/19/24 0815    piperacillin-tazobactam (ZOSYN) 4.5 g in D5W 100 mL IVPB (MB+)  4.5 g Intravenous Q8H Tania Butts AGACNP-BC        polyethylene glycol packet 17 g  17 g Oral BID PRN Benjamín, Tania G, AGACNP-BC        prochlorperazine injection Soln 5 mg  5 mg Intravenous Q6H PRN Tania Butts AGACNP-BC        QUEtiapine tablet 25 mg  25 mg Per G Tube BID Juancarlos Mota MD   25 mg at 08/19/24 0956    scopolamine 1.3-1.5 mg (1 mg over 3 days) 1 patch  1 patch Transdermal Q3 Days Juancarlos Mota MD        simethicone chewable  tablet 80 mg  1 tablet Oral QID PRN Tania Butts AGACNP-BC        tamsulosin 24 hr capsule 0.4 mg  0.4 mg Oral QHS Juancarlos Mota MD        vancomycin - pharmacy to dose   Intravenous pharmacy to manage frequency Tania ButtsPEGGY        vancomycin 1.25 g in dextrose 5% 250 mL IVPB (ready to mix)  1,250 mg Intravenous Q12H Benjamín PEGGY Wilson        venlafaxine 24 hr capsule 75 mg  75 mg Oral Daily Juancarlos Mota MD   75 mg at 08/19/24 0960     Current Outpatient Medications   Medication Sig Dispense Refill    ascorbic Acid (VITAMIN C) 500 mg CpSR 500 mg 2 (two) times daily. Per PEG tube      busPIRone (BUSPAR) 5 MG Tab 5 mg by Per G Tube route 3 (three) times daily.      cholestyramine (QUESTRAN) 4 gram packet 4 g once daily. Via PEG tube      cholestyramine-aspartame (QUESTRAN LIGHT) 4 gram PwPk 1 packet (4 g total) by Per G Tube route 2 (two) times daily. 180 packet 3    diltiaZEM (CARDIZEM) 60 MG tablet 60 mg by Per G Tube route every 6 (six) hours.      ferrous sulfate 300 mg (60 mg iron)/5 mL syrup Take 5 mLs (300 mg total) by mouth once daily. 450 mL 0    finasteride (PROSCAR) 5 mg tablet Take 1 tablet (5 mg total) by mouth once daily. 30 tablet 11    furosemide (LASIX) 80 MG tablet 80 mg by Per G Tube route as needed (for Edema).      L. acidophilus/L.bulgaricus (FLORANEX ORAL) 1 packet by PEG Tube route Daily.      LANSOPRAZOLE 3 MG/ML SUSP (PREVACID) 10 mLs (30 mg total) by Per G Tube route once daily. 600 mL 0    levetiracetam 500 mg/5 mL (5 mL) Soln 1,500 mg by Per G Tube route 2 (two) times daily. Nursing home reports medication hold by MD until level redraw on Monday.      LIPITOR 10 mg tablet 10 mg by Per G Tube route once daily.      metoprolol tartrate (LOPRESSOR) 100 MG tablet 100 mg by Per G Tube route 2 (two) times daily.      multivitamin (THERAGRAN) per tablet 1 tablet by Per G Tube route once daily.      protein supplement (PROMOD PROTEIN ORAL) 30 mLs by Per G Tube route  once daily.      QUEtiapine (SEROQUEL) 25 MG Tab 25 mg by Per G Tube route 2 (two) times daily.      scopolamine (TRANSDERM-SCOP) 1.3-1.5 mg (1 mg over 3 days) Place 1 patch onto the skin Every 3 (three) days.      tamsulosin (FLOMAX) 0.4 mg Cap Take 1 capsule (0.4 mg total) by mouth every evening. 30 capsule 11    venlafaxine 75 mg TR24 1 tablet by Per G Tube route 2 (two) times a day.      XARELTO 20 mg Tab 1 tablet by Per G Tube route nightly.       (Not in a hospital admission)      Past Medical History:  Past Medical History:   Diagnosis Date    Arthritis     Atrial fibrillation     BPH (benign prostatic hyperplasia)     Cardiac arrest     Coronary artery disease     Cyst, kidney, acquired     Diverticulosis     Hyperlipidemia     Hypertension     MI (myocardial infarction)     Obesity     Steatosis of liver     Stroke       Past Surgical History:  Past Surgical History:   Procedure Laterality Date    A-V CARDIAC PACEMAKER INSERTION Right     CARDIAC CATHETERIZATION      COLONOSCOPY W/ BIOPSIES      CRANIECTOMY Right 12/20/2023    Procedure: CRANIECTOMY;  Surgeon: Artem Can MD;  Location: [facility] OR;  Service: Neurosurgery;  Laterality: Right;    ESOPHAGOGASTRODUODENOSCOPY W/ PEG N/A 1/2/2024    Procedure: PEG;  Surgeon: Tani Day MD;  Location: [facility] ENDOSCOPY;  Service: Gastroenterology;  Laterality: N/A;    excision of colon      TRACHEOSTOMY N/A 12/29/2023    Procedure: CREATION, TRACHEOSTOMY;  Surgeon: Patricia Winslow MD;  Location: [facility] OR;  Service: ENT;  Laterality: N/A;  REQ 1130 //  NEEDS 2 SCRUBS      Family History:  Family History   Problem Relation Name Age of Onset    Hypertension Mother      Hypertension Father      Hypertension Sister       Social History:  Social History     Tobacco Use    Smoking status: Never    Smokeless tobacco: Never   Substance Use Topics    Alcohol use: Not Currently       Review of Systems:     Review of Systems   Unable to perform ROS: Patient  nonverbal       Objective:     VITAL SIGNS: 24 HR MIN & MAX LAST    Temp  Min: 98.4 °F (36.9 °C)  Max: 101.4 °F (38.6 °C)  98.4 °F (36.9 °C)        BP  Min: 76/49  Max: 125/69  125/69     Pulse  Min: 75  Max: 134  78     Resp  Min: 19  Max: 30  20    SpO2  Min: 98 %  Max: 100 %  98 %      No intake or output data in the 24 hours ending 08/19/24 1018    Physical Exam  Constitutional:       General: He is not in acute distress.     Appearance: He is ill-appearing (chronically).   HENT:      Head: Normocephalic and atraumatic.      Comments: Crani scar  Eyes:      General: No scleral icterus.  Cardiovascular:      Rate and Rhythm: Normal rate and regular rhythm.   Pulmonary:      Effort: Pulmonary effort is normal. No respiratory distress.      Comments: Trach  Abdominal:      General: Bowel sounds are normal. There is no distension.      Palpations: Abdomen is soft. There is no mass.      Tenderness: There is no abdominal tenderness. There is no guarding or rebound.      Comments: PEG in place in epigastrium.  External bumper not flush with skin.  Markings no longer able to be seen. Some dried old blood peristomal and mild granulation tissue but no active bleeding.  TFs on hold.  Some water from recent flush but no blood or dark return.  Flushes easily without issue.   Musculoskeletal:      Right lower leg: No edema.      Left lower leg: No edema.      Comments: Contracted LUE   Skin:     General: Skin is warm and dry.      Coloration: Skin is not jaundiced or pale.   Neurological:      Comments: Does not respond or follow commands.    Psychiatric:      Comments: Unable to assess           Recent Results (from the past 48 hour(s))   COVID/RSV/FLU A&B PCR    Collection Time: 08/19/24  1:37 AM   Result Value Ref Range    Influenza A PCR Not Detected Not Detected    Influenza B PCR Not Detected Not Detected    Respiratory Syncytial Virus PCR Not Detected Not Detected    SARS-CoV-2 PCR Not Detected Not Detected, Negative    Comprehensive metabolic panel    Collection Time: 08/19/24  2:04 AM   Result Value Ref Range    Sodium 142 136 - 145 mmol/L    Potassium 3.7 3.5 - 5.1 mmol/L    Chloride 99 98 - 107 mmol/L    CO2 27 23 - 31 mmol/L    Glucose 92 82 - 115 mg/dL    Blood Urea Nitrogen 19.3 8.4 - 25.7 mg/dL    Creatinine 0.88 0.73 - 1.18 mg/dL    Calcium 9.3 8.8 - 10.0 mg/dL    Protein Total 7.6 5.8 - 7.6 gm/dL    Albumin 3.6 3.4 - 4.8 g/dL    Globulin 4.0 (H) 2.4 - 3.5 gm/dL    Albumin/Globulin Ratio 0.9 (L) 1.1 - 2.0 ratio    Bilirubin Total 1.2 <=1.5 mg/dL     (H) 40 - 150 unit/L    ALT 26 0 - 55 unit/L    AST 31 5 - 34 unit/L    eGFR >60 mL/min/1.73/m2    Anion Gap 16.0 mEq/L    BUN/Creatinine Ratio 22    Brain natriuretic peptide    Collection Time: 08/19/24  2:04 AM   Result Value Ref Range    Natriuretic Peptide 249.1 (H) <=100.0 pg/mL   APTT    Collection Time: 08/19/24  2:04 AM   Result Value Ref Range    PTT 27.0 23.2 - 33.7 seconds   Troponin I    Collection Time: 08/19/24  2:04 AM   Result Value Ref Range    Troponin-I 0.035 0.000 - 0.045 ng/mL   TSH    Collection Time: 08/19/24  2:04 AM   Result Value Ref Range    TSH 3.991 0.350 - 4.940 uIU/mL   Protime-INR    Collection Time: 08/19/24  2:04 AM   Result Value Ref Range    PT 16.7 (H) 12.5 - 14.5 seconds    INR 1.4 (H) <=1.3   Magnesium    Collection Time: 08/19/24  2:04 AM   Result Value Ref Range    Magnesium Level 2.00 1.60 - 2.60 mg/dL   CBC with Differential    Collection Time: 08/19/24  2:04 AM   Result Value Ref Range    WBC 16.45 (H) 4.50 - 11.50 x10(3)/mcL    RBC 5.54 4.70 - 6.10 x10(6)/mcL    Hgb 14.6 14.0 - 18.0 g/dL    Hct 45.9 42.0 - 52.0 %    MCV 82.9 80.0 - 94.0 fL    MCH 26.4 (L) 27.0 - 31.0 pg    MCHC 31.8 (L) 33.0 - 36.0 g/dL    RDW 16.0 11.5 - 17.0 %    Platelet 246 130 - 400 x10(3)/mcL    MPV 10.6 (H) 7.4 - 10.4 fL    Neut % 82.1 %    Lymph % 12.4 %    Mono % 4.2 %    Eos % 0.2 %    Basophil % 0.4 %    Lymph # 2.04 0.6 - 4.6 x10(3)/mcL    Neut #  13.51 (H) 2.1 - 9.2 x10(3)/mcL    Mono # 0.69 0.1 - 1.3 x10(3)/mcL    Eos # 0.04 0 - 0.9 x10(3)/mcL    Baso # 0.06 <=0.2 x10(3)/mcL    IG# 0.11 (H) 0 - 0.04 x10(3)/mcL    IG% 0.7 %    NRBC% 0.0 %   Lactic acid, plasma    Collection Time: 08/19/24  2:31 AM   Result Value Ref Range    Lactic Acid Level 2.6 (H) 0.5 - 2.2 mmol/L   Lactic Acid, Plasma    Collection Time: 08/19/24  4:20 AM   Result Value Ref Range    Lactic Acid Level 2.1 0.5 - 2.2 mmol/L   POCT glucose    Collection Time: 08/19/24  7:06 AM   Result Value Ref Range    POCT Glucose 129 (H) 70 - 110 mg/dL   MRSA PCR    Collection Time: 08/19/24  8:10 AM   Result Value Ref Range    MRSA PCR Screen Detected (A) Not Detected       X-Ray Chest 1 View    Result Date: 8/19/2024  EXAMINATION XR CHEST 1 VIEW CLINICAL HISTORY Cough, unspecified TECHNIQUE A total of 1 frontal image(s) of the chest. COMPARISON 6 August 2024 FINDINGS Lines/tubes/devices: Grossly unchanged positioning when allowing for differences in technique and patient rotation. The cardiac silhouette and central vascular structures are unchanged.  The trachea is midline. No new or worsening consolidation is identified. There is no enlarging pleural effusion or convincing pneumothorax. Regional osseous structures and extrathoracic soft tissues are similar. IMPRESSION No significant interval change. Electronically signed by: Isaac Carcamo Date:    08/19/2024 Time:    07:16    CT Chest Abdomen Pelvis With IV Contrast (XPD)    Result Date: 8/19/2024  EXAMINATION CT CHEST ABDOMEN PELVIS WITH IV CONTRAST (XPD) CLINICAL HISTORY Sepsis; TECHNIQUE Post-contrast helical-acquisition CT images were obtained and multiplanar reformats accomplished by a CT technologist at a separate workstation and pushed to PACS for physician review. CONTRAST *IV: Omnipaque 350, 100 mL *Enteric: none COMPARISON *18 July 2024 chest CT *4 August 2024 abdominopelvic CT FINDINGS Images were reviewed in soft tissue, lung, and bone  windows. Exam quality: adequate for evaluation Lines/tubes: Unchanged tracheostomy and right chest wall cardiac device.  Left upper quadrant PEG tube and Fernandez catheter remain in place. Similar dilated appearance of the heart chambers.  Mediastinal vasculature is unchanged.  No development of pericardial effusion.  Bilateral lung infiltrates developed in the interval.  No pleural effusion or pneumothorax. Gallbladder and bile ducts are similar.  No significant change of the upper abdominal solid organs in the relatively short interval.  There is no radiodense urolithiasis or findings of distal obstructive uropathy.  Urinary bladder is nondistended, precluding adequate evaluation.  Prostate gland is unchanged. Esophagus is unremarkable.  Stomach nondistended.  No findings of high-grade mechanical bowel obstruction or other acute gastrointestinal process.  Appendix is normal.  Small bowel anastomosis at the right lower quadrant unchanged in comparison. There is no free intra-abdominal air or fluid.  No drainable collections.  Aortoiliac tapering through the abdomen and pelvis is unchanged in comparison.  Similar mild burden of scattered atherosclerotic calcification.  No development of pathologic lymph node enlargement. Advanced chronic alterations with heterotopic ossification at the left hip unchanged in the interval.  Remaining visualized osseous structures are stable in comparison.  No new or worsening subcutaneous or muscular abnormality. IMPRESSION 1. New bilateral lung infiltrates may represent developing infectious process. 2. Otherwise, unchanged CT appearance of the visualized structures.  Chronic secondary findings discussed above. RADIATION DOSE Automated tube current modulation, weight-based exposure dosing, and/or iterative reconstruction technique utilized to reach lowest reasonably achievable exposure rate. DLP: 1534 mGy*cm Electronically signed by: Isaac Carcamo Date:    08/19/2024  Time:    07:15    X-Ray Chest 1 View    Result Date: 8/6/2024  EXAMINATION: XR CHEST 1 VIEW CPT 94229 CLINICAL HISTORY: Sob; COMPARISON: August 3, 2024 FINDINGS: Examination reveals cardiomediastinal silhouette and pleuroparenchymal changes are essentially unchanged as compared with the previous exam     No significant change Electronically signed by: Vik Keen Date:    08/06/2024 Time:    14:06    CT Abdomen Pelvis With IV Contrast NO Oral Contrast    Result Date: 8/4/2024  EXAMINATION: CT ABDOMEN PELVIS WITH IV CONTRAST CLINICAL HISTORY: Abdominal pain, acute, nonlocalized;Nausea/vomiting; TECHNIQUE: Helical acquisition through the abdomen and pelvis with IV contrast.  Three plane reconstructions were provided for review. DLP 1610 mGycm. Automatic exposure control, adjustment of mA/kV or iterative reconstruction technique was used to reduce radiation. COMPARISON: 18 July 2024 FINDINGS: Motion degraded scan. The limited imaged lung bases are clear. No acute findings liver, gallbladder, spleen, pancreas or adrenals.  No hydronephrosis. There is prominent distal esophageal wall thickening.  A gastrostomy tube is in place.  No significant inflammatory changes of the small or large bowel.  Normal appendix.  No free air. There is a Fernandez in the urinary bladder.  No pelvic free fluid.  Abdominal aorta normal in caliber.  Mild atherosclerotic disease. There are no acute osseous findings.  Prominent heterotopic bone around the left hip.  Is     1. Prominent distal esophageal wall thickening suggestive of esophagitis. 2. Otherwise no acute abdominopelvic findings.  Chronic findings above. 3. No significant discrepancy with the preliminary report. Electronically signed by: Tani Calderon Date:    08/04/2024 Time:    09:25    X-Ray Chest AP Portable    Result Date: 8/3/2024  EXAMINATION: XR CHEST AP PORTABLE CLINICAL HISTORY: Vomiting, unspecified TECHNIQUE: One view COMPARISON: July 21, 2024. FINDINGS:  Cardiopericardial silhouette appearance is similar.  Cardiac device electrodes are in similar location.  Left upper chest is obscured by the mandible.  No overt edema or consolidation.  There are right infrahilar lower lung lobe atelectatic changes.  Possible small left pleural effusion.     Right infrahilar lower lung zone atelectasis and possible small left pleural effusion. Electronically signed by: Sami Taylor Date:    08/03/2024 Time:    22:09    CT Head Without Contrast    Result Date: 7/23/2024  EXAMINATION: CT HEAD WITHOUT CONTRAST CLINICAL HISTORY: Implant planning; TECHNIQUE: CT imaging of the head and neck performed according to a planning protocol.  DLP 1255 mGycm. Automatic exposure control, adjustment of mA/kV or iterative reconstruction technique was used to reduce radiation. COMPARISON: 18 July 2024 FINDINGS: Perhaps slightly increased herniation of the brain through the craniectomy defect.  No new parenchymal attenuation abnormality.  No acute hemorrhage seen.  Little if any midline shift.  Similar ventricular enlargement.  There are vascular calcifications.     CT for surgical planning.  Perhaps slightly increased herniation of the brain through the craniectomy defect. Electronically signed by: Tani Calderon Date:    07/23/2024 Time:    17:16    XR Gastric tube check, non-radiologist performed    Result Date: 7/21/2024  EXAMINATION: XR GASTRIC TUBE CHECK, NON-RADIOLOGIST PERFORMED CLINICAL HISTORY: not tolerating tube feeds via PEG, resistance when attempting to tighten external bumper; TECHNIQUE: One view COMPARISON: January 20, 2024 FINDINGS: Contrast instilled through the gastrostomy tube partially opacifies the stomach.  There is no contrast extravasation.     Gastrostomy tube terminates within the stomach. Electronically signed by: Sami Taylor Date:    07/21/2024 Time:    15:39    X-Ray Chest 1 View    Result Date: 7/21/2024  EXAMINATION: XR CHEST 1 VIEW CLINICAL HISTORY: sob; TECHNIQUE:  Single frontal view of the chest was performed. COMPARISON: 07/18/2024 FINDINGS: LINES AND TUBES: Tracheostomy cannula projects over the trachea with tip at the level of the clavicular heads.  Dual lead cardiac pacer device is in place via right subclavian approach with leads overlying the right atrium and right ventricle. MEDIASTINUM AND MARJORIE: Cardiac silhouette is enlarged.  Aortic atherosclerosis. LUNGS: No lobar consolidation. No edema. PLEURA:No pleural effusion. No pneumothorax. OTHER: No acute osseous abnormality.     Enlarged cardiac silhouette without edema Electronically signed by: Laureen Christina Date:    07/21/2024 Time:    12:08      Imaging personally reviewed by myself and SP.    Assessment / Plan:     68 y.o. male known to Dr. Escalona from inpatient care (primary GI is Dr. 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  Presented from nursing home for concerns of dark vomiting from mouth and trach.  Admitted with pneumonia, sepsis, and acute respiratory failure.      Coffee ground emesis  Hgb normal.  Possibly hemoconcentrated in setting of sepsis so expect some drop with hydration.  Possibly related to underlying pneumonia or potential TF intolerance.  Certainly no blood in the stomach at this time.  CT reports normal esophagus and stomach.   - Continue ppi and ok to start TFs slow and advance as tolerated.  Keep HOB elevated.  No plans for endoscopy at this time.     Please call GI if needed.       Thank you for allowing us to participate in this patient's care.

## 2024-08-19 NOTE — H&P
[facility] Hospital Medicine History & Physical Examination       Patient Name: Delio Daniel Jr.  MRN: [id number]  Patient Class: IP- Inpatient   Admission Date: 08/19/2024   Admitting Service: Hospital Medicine   Length of Stay: 0  Attending Physician: Juancarlos Mota MD   Primary Care Provider: Jl Briones MD  Face-to-Face encounter date: 08/19/2024  Code Status: Full code   Chief Complaint: Vomiting (Pt here via ems with episode of vomiting from his trach, ems suctioned the trach, initial cannula was out when ems arrived to [facility] they reinstalled it. Pt is normally on RA he is on 6L trach mask now. )      Patient information was obtained from patient, patient's family, past medical records and ER records.      HISTORY OF PRESENT ILLNESS:   Delio Daniel Jr. is a 69 y.o. male with a past medical history of hypertension, hyperlipidemia, CAD, atrial fibrillation on Xarelto, CVA with left-sided deficits, trach and PEG dependence, liver steatosis, diverticulosis, BPH, and arthritis who presented to [facility] on 8/19/2024 from nursing home via EMS for vomiting.  Nursing home reported patient had vomiting from mouth and trach.  EMS reported that nursing home deflated tracheostomy cuff and removed the inner cannula after vomiting episode. EMS re-installed cannula and patient was placed on 6 L trach mask.  History is limited secondary to patient's medical condition, therefore majority of the history was obtained upon review of the medical record.   Initial vital signs in ED were BP 78/52, pulse 132, respirations 30, temperature 37.7° C, and SpO2 98 % on 4 L trach collar.  Labs revealed WBC 16.45, , .1, troponin 0.035, and lactic acid 2.6.  COVID, flu, and RSV testing were negative.  Blood cultures were obtained.  Chest x-ray revealed no significant interval change.  CT chest abdomen and pelvis with IV contrast revealed new bilateral lung infiltrates which may result developing  infectious process. EKG revealed atrial fibrillation with RVR and heart rate of 125 bpm. Patient was given IV Zosyn 4.5 g, IV Vancomycin 1.75 g, LR, IV Protonix 80 mg, IV Digoxin 250 mcg, and IV Tylenol 1,000 mg in ED. Vomitus at bedside was positive for occult blood. Patient was admitted to hospital medicine service for further medical management.     PAST MEDICAL HISTORY:   Hypertension   Hyperlipidemia   CAD   Atrial fibrillation on Xarelto   CVA with left-sided deficits   Trach and PEG dependence   Liver steatosis  Diverticulosis   BPH   Arthritis    PAST SURGICAL HISTORY:     Past Surgical History:   Procedure Laterality Date    A-V CARDIAC PACEMAKER INSERTION Right     CARDIAC CATHETERIZATION      COLONOSCOPY W/ BIOPSIES      CRANIECTOMY Right 12/20/2023    Procedure: CRANIECTOMY;  Surgeon: Artem Can MD;  Location: [facility] OR;  Service: Neurosurgery;  Laterality: Right;    ESOPHAGOGASTRODUODENOSCOPY W/ PEG N/A 1/2/2024    Procedure: PEG;  Surgeon: Tani Day MD;  Location: [facility] ENDOSCOPY;  Service: Gastroenterology;  Laterality: N/A;    excision of colon      TRACHEOSTOMY N/A 12/29/2023    Procedure: CREATION, TRACHEOSTOMY;  Surgeon: Patricia Winslow MD;  Location: [facility] OR;  Service: ENT;  Laterality: N/A;  REQ 1130 //  NEEDS 2 SCRUBS       FAMILY HISTORY:   Hypertension: Mother, father, and sister    SOCIAL HISTORY:   Unable to obtain    Screening for Social Drivers for health:  Patient screened for food insecurity, housing instability, transportation needs, utility difficulties, and interpersonal safety (select all that apply as identified as concern)  []Housing or Food  []Transportation Needs  []Utility Difficulties  []Interpersonal safety  [x]None    ALLERGIES:   Patient has no known allergies.    HOME MEDICATIONS:     Prior to Admission medications    Medication Sig Start Date End Date Taking? Authorizing Provider   ascorbic Acid (VITAMIN C) 500 mg CpSR 500 mg 2 (two) times daily. Per PEG  tube    Provider, Historical   busPIRone (BUSPAR) 5 MG Tab 5 mg by Per G Tube route 3 (three) times daily. 3/6/24   Provider, Historical   cholestyramine (QUESTRAN) 4 gram packet 4 g once daily. Via PEG tube    Provider, Historical   cholestyramine-aspartame (QUESTRAN LIGHT) 4 gram PwPk 1 packet (4 g total) by Per G Tube route 2 (two) times daily. 3/4/24 3/4/25  Su Garcia FNP   diltiaZEM (CARDIZEM) 60 MG tablet 60 mg by Per G Tube route every 6 (six) hours. 3/6/24   Provider, Historical   ferrous sulfate 300 mg (60 mg iron)/5 mL syrup Take 5 mLs (300 mg total) by mouth once daily. 8/10/24 11/8/24  Bryon Maki MD   finasteride (PROSCAR) 5 mg tablet Take 1 tablet (5 mg total) by mouth once daily. 3/5/24 3/5/25  Su Garcia FNP   furosemide (LASIX) 80 MG tablet 80 mg by Per G Tube route as needed (for Edema). 5/7/24   Provider, Historical   L. acidophilus/L.bulgaricus (FLORANEX ORAL) 1 packet by PEG Tube route Daily.    Provider, Historical   LANSOPRAZOLE 3 MG/ML SUSP (PREVACID) 10 mLs (30 mg total) by Per G Tube route once daily. 8/10/24 10/9/24  Bryon Maki MD   levetiracetam 500 mg/5 mL (5 mL) Soln 1,500 mg by Per G Tube route 2 (two) times daily. Nursing home reports medication hold by MD until level redraw on Monday.    Provider, Historical   LIPITOR 10 mg tablet 10 mg by Per G Tube route once daily. 3/7/24   Provider, Historical   metoprolol tartrate (LOPRESSOR) 100 MG tablet 100 mg by Per G Tube route 2 (two) times daily.    Provider, Historical   multivitamin (THERAGRAN) per tablet 1 tablet by Per G Tube route once daily.    Provider, Historical   protein supplement (PROMOD PROTEIN ORAL) 30 mLs by Per G Tube route once daily.    Provider, Historical   QUEtiapine (SEROQUEL) 25 MG Tab 25 mg by Per G Tube route 2 (two) times daily.    Provider, Historical   scopolamine (TRANSDERM-SCOP) 1.3-1.5 mg (1 mg over 3 days) Place 1 patch onto the skin Every 3 (three) days. 7/15/24   Provider,  Historical   tamsulosin (FLOMAX) 0.4 mg Cap Take 1 capsule (0.4 mg total) by mouth every evening. 3/4/24 3/4/25  Su Garcia, TRUMAN   venlafaxine 75 mg TR24 1 tablet by Per G Tube route 2 (two) times a day. 3/6/24   Provider, Historical   XARELTO 20 mg Tab 1 tablet by Per G Tube route nightly. 3/6/24   Provider, Historical     ________________________________________________________________________  INPATIENT LIST OF MEDICATIONS     Current Facility-Administered Medications:     acetaminophen tablet 650 mg, 650 mg, Oral, Q6H PRN, Tania Butts AGACNP-BC    aluminum-magnesium hydroxide-simethicone 200-200-20 mg/5 mL suspension 30 mL, 30 mL, Oral, QID PRN, Tania Butts AGACNP-BC    melatonin tablet 6 mg, 6 mg, Oral, Nightly PRN, Tania Butts AGACNP-BC    [START ON 8/21/2024] mupirocin 2 % ointment, , Nasal, BID, Sekou Contreras MD    naloxone 0.4 mg/mL injection 0.02 mg, 0.02 mg, Intravenous, PRN, Tania Butts AGACNP-BC    ondansetron injection 4 mg, 4 mg, Intravenous, Q4H PRN, Tania Butts AGACNP-BC    pantoprazole injection 40 mg, 40 mg, Intravenous, BID, Tania Butts AGACNP-BC    piperacillin-tazobactam (ZOSYN) 4.5 g in D5W 100 mL IVPB (MB+), 4.5 g, Intravenous, Q8H, Tania Butts AGACNP-BC    polyethylene glycol packet 17 g, 17 g, Oral, BID PRN, Tania Butts AGACNP-BC    prochlorperazine injection Soln 5 mg, 5 mg, Intravenous, Q6H PRN, Tania Butts AGACNP-BC    simethicone chewable tablet 80 mg, 1 tablet, Oral, QID PRN, Tania Butts AGACNP-BC    vancomycin - pharmacy to dose, , Intravenous, pharmacy to manage frequency, Tania Butts AGACNP-BC    vancomycin 1.25 g in dextrose 5% 250 mL IVPB (ready to mix), 1,250 mg, Intravenous, Q12H, Tania Butts AGACNP-BC    Current Outpatient Medications:     ascorbic Acid (VITAMIN C) 500 mg CpSR, 500 mg 2 (two) times daily. Per PEG tube, Disp: , Rfl:     busPIRone (BUSPAR) 5 MG Tab, 5 mg by Per G Tube route 3  (three) times daily., Disp: , Rfl:     cholestyramine (QUESTRAN) 4 gram packet, 4 g once daily. Via PEG tube, Disp: , Rfl:     cholestyramine-aspartame (QUESTRAN LIGHT) 4 gram PwPk, 1 packet (4 g total) by Per G Tube route 2 (two) times daily., Disp: 180 packet, Rfl: 3    diltiaZEM (CARDIZEM) 60 MG tablet, 60 mg by Per G Tube route every 6 (six) hours., Disp: , Rfl:     ferrous sulfate 300 mg (60 mg iron)/5 mL syrup, Take 5 mLs (300 mg total) by mouth once daily., Disp: 450 mL, Rfl: 0    finasteride (PROSCAR) 5 mg tablet, Take 1 tablet (5 mg total) by mouth once daily., Disp: 30 tablet, Rfl: 11    furosemide (LASIX) 80 MG tablet, 80 mg by Per G Tube route as needed (for Edema)., Disp: , Rfl:     L. acidophilus/L.bulgaricus (FLORANEX ORAL), 1 packet by PEG Tube route Daily., Disp: , Rfl:     LANSOPRAZOLE 3 MG/ML SUSP (PREVACID), 10 mLs (30 mg total) by Per G Tube route once daily., Disp: 600 mL, Rfl: 0    levetiracetam 500 mg/5 mL (5 mL) Soln, 1,500 mg by Per G Tube route 2 (two) times daily. Nursing home reports medication hold by MD until level redraw on Monday., Disp: , Rfl:     LIPITOR 10 mg tablet, 10 mg by Per G Tube route once daily., Disp: , Rfl:     metoprolol tartrate (LOPRESSOR) 100 MG tablet, 100 mg by Per G Tube route 2 (two) times daily., Disp: , Rfl:     multivitamin (THERAGRAN) per tablet, 1 tablet by Per G Tube route once daily., Disp: , Rfl:     protein supplement (PROMOD PROTEIN ORAL), 30 mLs by Per G Tube route once daily., Disp: , Rfl:     QUEtiapine (SEROQUEL) 25 MG Tab, 25 mg by Per G Tube route 2 (two) times daily., Disp: , Rfl:     scopolamine (TRANSDERM-SCOP) 1.3-1.5 mg (1 mg over 3 days), Place 1 patch onto the skin Every 3 (three) days., Disp: , Rfl:     tamsulosin (FLOMAX) 0.4 mg Cap, Take 1 capsule (0.4 mg total) by mouth every evening., Disp: 30 capsule, Rfl: 11    venlafaxine 75 mg TR24, 1 tablet by Per G Tube route 2 (two) times a day., Disp: , Rfl:     XARELTO 20 mg Tab, 1 tablet by  Per G Tube route nightly., Disp: , Rfl:     Scheduled Meds:   [START ON 8/21/2024] mupirocin   Nasal BID    pantoprazole  40 mg Intravenous BID    piperacillin-tazobactam (Zosyn) IV (PEDS and ADULTS) (extended infusion is not appropriate)  4.5 g Intravenous Q8H    vancomycin (VANCOCIN) IV (PEDS and ADULTS)  1,250 mg Intravenous Q12H     Continuous Infusions:  PRN Meds:.  Current Facility-Administered Medications:     acetaminophen, 650 mg, Oral, Q6H PRN    aluminum-magnesium hydroxide-simethicone, 30 mL, Oral, QID PRN    melatonin, 6 mg, Oral, Nightly PRN    naloxone, 0.02 mg, Intravenous, PRN    ondansetron, 4 mg, Intravenous, Q4H PRN    polyethylene glycol, 17 g, Oral, BID PRN    prochlorperazine, 5 mg, Intravenous, Q6H PRN    simethicone, 1 tablet, Oral, QID PRN    vancomycin - pharmacy to dose, , Intravenous, pharmacy to manage frequency      REVIEW OF SYSTEMS:   Except as documented, all other systems reviewed and negative.    PHYSICAL EXAM:     VITAL SIGNS: 24 HRS MIN & MAX LAST   Temp  Min: 98.4 °F (36.9 °C)  Max: 101.4 °F (38.6 °C) 98.4 °F (36.9 °C)   BP  Min: 76/49  Max: 123/82 109/65   Pulse  Min: 93  Max: 134  93   Resp  Min: 23  Max: 30 (!) 23   SpO2  Min: 98 %  Max: 100 % 100 %       General appearance: Male in no apparent distress.  HENT: Atraumatic head.    Neck: Supple. Tracheostomy in place on 4 L trach collar  Lungs:  Coarse breath sounds bilaterally  Heart:  Irregular rhythm  Abdomen: Soft, non-distended, non-tender.  PEG tube to left abdomen  Skin: No Rash.   Neuro:  Awake and alert.  Nonverbal from previous CVA    LABS AND IMAGING:     Recent Labs   Lab 08/19/24  0204   WBC 16.45*   RBC 5.54   HGB 14.6   HCT 45.9   MCV 82.9   MCH 26.4*   MCHC 31.8*   RDW 16.0      MPV 10.6*       Recent Labs   Lab 08/19/24  0204      K 3.7   CL 99   CO2 27   BUN 19.3   CREATININE 0.88   CALCIUM 9.3   MG 2.00   ALBUMIN 3.6   ALKPHOS 152*   ALT 26   AST 31   BILITOT 1.2       Microbiology Results  (last 7 days)       Procedure Component Value Units Date/Time    Blood culture #1 **CANNOT BE ORDERED STAT** [[id number]] Collected: 08/19/24 0231    Order Status: Resulted Specimen: Blood Updated: 08/19/24 0240    Blood culture #2 **CANNOT BE ORDERED STAT** [[id number]] Collected: 08/19/24 0231    Order Status: Resulted Specimen: Blood Updated: 08/19/24 0240             X-Ray Chest 1 View  EXAMINATION  XR CHEST 1 VIEW    CLINICAL HISTORY  Cough, unspecified    TECHNIQUE  A total of 1 frontal image(s) of the chest.    COMPARISON  6 August 2024    FINDINGS  Lines/tubes/devices: Grossly unchanged positioning when allowing for differences in technique and patient rotation.    The cardiac silhouette and central vascular structures are unchanged.  The trachea is midline. No new or worsening consolidation is identified. There is no enlarging pleural effusion or convincing pneumothorax.    Regional osseous structures and extrathoracic soft tissues are similar.    IMPRESSION  No significant interval change.    Electronically signed by: Isaac Carcamo  Date:    08/19/2024  Time:    07:16  CT Chest Abdomen Pelvis With IV Contrast (XPD)  EXAMINATION  CT CHEST ABDOMEN PELVIS WITH IV CONTRAST (XPD)    CLINICAL HISTORY  Sepsis;    TECHNIQUE  Post-contrast helical-acquisition CT images were obtained and multiplanar reformats accomplished by a CT technologist at a separate workstation and pushed to PACS for physician review.    CONTRAST  *IV: Omnipaque 350, 100 mL  *Enteric: none    COMPARISON  *18 July 2024 chest CT  *4 August 2024 abdominopelvic CT    FINDINGS  Images were reviewed in soft tissue, lung, and bone windows.    Exam quality: adequate for evaluation    Lines/tubes: Unchanged tracheostomy and right chest wall cardiac device.  Left upper quadrant PEG tube and Fernandez catheter remain in place.    Similar dilated appearance of the heart chambers.  Mediastinal vasculature is unchanged.  No development of pericardial effusion.   Bilateral lung infiltrates developed in the interval.  No pleural effusion or pneumothorax.    Gallbladder and bile ducts are similar.  No significant change of the upper abdominal solid organs in the relatively short interval.  There is no radiodense urolithiasis or findings of distal obstructive uropathy.  Urinary bladder is nondistended, precluding adequate evaluation.  Prostate gland is unchanged.    Esophagus is unremarkable.  Stomach nondistended.  No findings of high-grade mechanical bowel obstruction or other acute gastrointestinal process.  Appendix is normal.  Small bowel anastomosis at the right lower quadrant unchanged in comparison.    There is no free intra-abdominal air or fluid.  No drainable collections.  Aortoiliac tapering through the abdomen and pelvis is unchanged in comparison.  Similar mild burden of scattered atherosclerotic calcification.  No development of pathologic lymph node enlargement.    Advanced chronic alterations with heterotopic ossification at the left hip unchanged in the interval.  Remaining visualized osseous structures are stable in comparison.  No new or worsening subcutaneous or muscular abnormality.    IMPRESSION  1. New bilateral lung infiltrates may represent developing infectious process.  2. Otherwise, unchanged CT appearance of the visualized structures.  Chronic secondary findings discussed above.    RADIATION DOSE  Automated tube current modulation, weight-based exposure dosing, and/or iterative reconstruction technique utilized to reach lowest reasonably achievable exposure rate.    DLP: 1534 mGy*cm    Electronically signed by: Isaac Carcamo  Date:    08/19/2024  Time:    07:15        ASSESSMENT & PLAN:   Assessment:   Sepsis secondary to pneumonia   Acute hypoxemic respiratory failure  Hematemesis   Atrial fibrillation on Xarelto  History of hypertension, hyperlipidemia, CAD, CVA with left-sided deficits, trach and PEG dependence, liver steatosis, diverticulosis,  BPH, and arthritis       Plan:  IV Vancomycin and Zosyn   Continue Vancomycin for high-risk MRSA-if MRSA negative discontinue   Blood cultures x2 obtained, follow-up results  Continue supplemental oxygen via trach collar, wean as tolerated  IV Protonix 40 mg BID  NPO   GI consulted, appreciate recommendations  Hold Xarelto  H&H stable- 14.6/45.9  Close H&H monitoring   Resume home medications as deemed appropriate once medication reconciliation is updated  Labs in AM    VTE Prophylaxis: SCDs    Discharge Planning and Disposition: TBD    I, Dale Amezquita PA-C have reviewed and discussed the case with Juancarlos Mota MD   Please see the attending MD's addendum for further assessment and plan.    Dale Amezquita PA-C  Department of Hospital Medicine   [facility]   08/19/2024    This note was created with the assistance of [employer] voice recognition software. There may be transcription errors as a result of using this technology, however minimal. Effort has been made to assure accuracy of transcription, but any obvious errors or omissions should be clarified with the author of the document.    _______________________________________________________________________________  MD Addendum:  Dr. CATHY , assumed care of this patient today at --am/pm  For the patient encounter, I performed the substantive portion of the visit, I reviewed the NP/PA documentation, treatment plan, and medical decision making.  I had face to face time with this patient     A. History:    B. Physical exam:    C. Medical decision making:      All diagnosis and differential diagnosis have been reviewed; assessment and plan has been documented; I have personally reviewed the labs and test results that are presently available; I have reviewed the patients medication list; I have reviewed the consulting providers response and recommendations. I have reviewed or attempted to review medical records based upon their  availability.    All of the patient and family questions have been addressed and answered. Patient's is agreeable to the above stated plan. I will continue to monitor closely and make adjustments to medical management as needed.      08/19/2024

## 2024-08-19 NOTE — ED NOTES
Notified hospitalist team of pt appearing more lethargic/harder to arouse and BP 93/68 after morning medications.

## 2024-08-19 NOTE — CONSULTS
Inpatient Nutrition Assessment    Admit Date: 8/19/2024   Total duration of encounter: 1 day   Patient Age: 69 y.o.    Nutrition Recommendation/Prescription     Once medically feasible, start EN. Start Pivot 1.5 (substitute for Impact 1.5) @ 25mL/hr and advance as tolerated to goal rate of 80ml/hr (Based on est run time 20h/day) + free water flush 100ml every 2h   Provides:  2400 kcal (100% est needs)  150 gm protein (110% est needs)  2432 ml free water (107% est needs)  Monitor wt and labs.     Communication of Recommendations: reviewed with nurse    Nutrition Assessment     Malnutrition Assessment/Nutrition-Focused Physical Exam    Malnutrition Context: chronic illness (08/19/24 1221)  Malnutrition Level: other (see comments) (does not meet criteria) (08/19/24 1221)  Energy Intake (Malnutrition): other (see comments) (unable to evaluate) (08/19/24 1221)  Weight Loss (Malnutrition): other (see comments) (unable to evaluate) (08/19/24 1221)  Subcutaneous Fat (Malnutrition): other (see comments) (does not meet criteria) (08/19/24 1221)           Muscle Mass (Malnutrition): other (see comments) (does not meet criteria) (08/19/24 1221)                          Fluid Accumulation (Malnutrition): other (see comments) (does not meet criteria) (08/19/24 1221)  Hand  Strength, Left (Malnutrition): unable to evaluate (08/19/24 1221)  Hand  Strength, Right (Malnutrition): unable to evaluate (08/19/24 1221)  A minimum of two characteristics is recommended for diagnosis of either severe or non-severe malnutrition.    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, MCA CVA 12/2023 now  "trach/PEG dependent     Scheduled Medications:  busPIRone, 5 mg, TID  cholestyramine, 1 packet, Daily  diltiaZEM, 60 mg, Q6H  finasteride, 5 mg, Daily  levetiracetam, 1,500 mg, BID  metoprolol tartrate, 100 mg, BID  [START ON 8/21/2024] mupirocin, , BID  pantoprazole, 40 mg, BID  piperacillin-tazobactam (Zosyn) IV (PEDS and ADULTS) (extended infusion is not appropriate), 4.5 g, Q8H  QUEtiapine, 25 mg, BID  scopolamine, 1 patch, Q3 Days  tamsulosin, 0.4 mg, QHS  vancomycin (VANCOCIN) IV (PEDS and ADULTS), 1,250 mg, Q12H  venlafaxine, 75 mg, Daily    Continuous Infusions:   PRN Medications:  acetaminophen, 650 mg, Q6H PRN  aluminum-magnesium hydroxide-simethicone, 30 mL, QID PRN  melatonin, 6 mg, Nightly PRN  naloxone, 0.02 mg, PRN  ondansetron, 4 mg, Q4H PRN  polyethylene glycol, 17 g, BID PRN  prochlorperazine, 5 mg, Q6H PRN  simethicone, 1 tablet, QID PRN  vancomycin - pharmacy to dose, , pharmacy to manage frequency    Calorie Containing IV Medications: no significant kcals from medications at this time    Recent Labs   Lab 08/19/24  0204      K 3.7   CALCIUM 9.3   MG 2.00   CO2 27   BUN 19.3   CREATININE 0.88   EGFRNORACEVR >60   GLUCOSE 92   BILITOT 1.2   ALKPHOS 152*   ALT 26   AST 31   ALBUMIN 3.6   WBC 16.45*   HGB 14.6   HCT 45.9     Nutrition Orders:  Diet NPO      Appetite/Oral Intake: NPO/NPO  Factors Affecting Nutritional Intake: NPO  Social Needs Impacting Access to Food: unable to assess at this time; will attempt on follow-up  Food/Faith/Cultural Preferences: unable to obtain  Food Allergies: no known food allergies  Last Bowel Movement: 08/19/24  Wound(s):  pressure injury on knee per EMR    Comments    8/19/24: Consult for TF recs; noted per previous admission, pt normally is on Impact Peptide 1.5 @ 80mL/hr; can order Pivot 1.5 (substitute for Impact 1.5). Unable to verify usual wt with pt; weights fluctuate too much in the last several months.     Anthropometrics    Height: 5' 10" (177.8 " cm), Height Method: Estimated  Last Weight: 90.7 kg (200 lb) (08/19/24 0106), Weight Method: Estimated  BMI (Calculated): 28.7  BMI Classification: overweight (BMI 25-29.9)        Ideal Body Weight (IBW), Male: 166 lb     % Ideal Body Weight, Male (lb): 120.48 %                          Usual Weight Provided By: EMR weight history    Wt Readings from Last 5 Encounters:   08/19/24 90.7 kg (200 lb)   08/03/24 117.9 kg (260 lb)   07/18/24 117.9 kg (260 lb)   06/25/24 90.7 kg (200 lb)   05/15/24 90.7 kg (200 lb)     Weight Change(s) Since Admission:   Wt Readings from Last 1 Encounters:   08/19/24 0106 90.7 kg (200 lb)   Admit Weight: 90.7 kg (200 lb) (08/19/24 0106), Weight Method: Estimated    Estimated Needs    Weight Used For Calorie Calculations: 90.7 kg (199 lb 15.3 oz)  Energy Calorie Requirements (kcal): 0005-3757 kcal (25-30 kcal/kg)  Energy Need Method: Kcal/kg  Weight Used For Protein Calculations: 90.7 kg (199 lb 15.3 oz)  Protein Requirements: 136 gm (1.5g/kg)  Fluid Requirements (mL): 2268 mL        Enteral Nutrition     Patient not receiving enteral nutrition at this time.    Parenteral Nutrition     Patient not receiving parenteral nutrition support at this time.    Evaluation of Received Nutrient Intake    Calories: not meeting estimated needs  Protein: not meeting estimated needs    Patient Education     Not applicable.    Nutrition Diagnosis     PES: Inadequate energy intake related to acute illness as evidenced by NPO with no nutrition support since admit. (new)     Nutrition Interventions     Intervention(s): modified composition of enteral nutrition, modified rate of enteral nutrition, and collaboration with other providers    Goal: Meet greater than 80% of nutritional needs by follow-up. (new)  Goal: Maintain weight throughout hospitalization. (new)    Nutrition Goals & Monitoring     Dietitian will monitor: energy intake, enteral nutrition intake, and weight  Discharge planning: tube feeding  (Pivot 1.5, goal rate of 80mL/hr)  Nutrition Risk/Follow-Up: moderate (follow-up in 3-5 days)   Please consult if re-assessment needed sooner.

## 2024-08-19 NOTE — ED PROVIDER NOTES
Encounter Date: 8/19/2024       History     Chief Complaint   Patient presents with    Vomiting     Pt here via ems with episode of vomiting from his trach, ems suctioned the trach, initial cannula was out when ems arrived to [facility] home they reinstalled it. Pt is normally on RA he is on 6L trach mask now.      69-year-old male sent from the nursing home vomiting coming from his mouth in his trach.  EMS reports that the vomiting is dark,  EMS reports that the nursing home had deflated the cuff and remove the inner cannula of the patient's tracheostomy when the episode started.  At presentation the patient is hypotensive has an elevated temperature and tachycardic with AFib with RVR.  The patient does take Xarelto for history of AFib.      Review of patient's allergies indicates:  No Known Allergies  Past Medical History:   Diagnosis Date    Arthritis     Atrial fibrillation     BPH (benign prostatic hyperplasia)     Cardiac arrest     Coronary artery disease     Cyst, kidney, acquired     Diverticulosis     Hyperlipidemia     Hypertension     MI (myocardial infarction)     Obesity     Steatosis of liver     Stroke      Past Surgical History:   Procedure Laterality Date    A-V CARDIAC PACEMAKER INSERTION Right     CARDIAC CATHETERIZATION      COLONOSCOPY W/ BIOPSIES      CRANIECTOMY Right 12/20/2023    Procedure: CRANIECTOMY;  Surgeon: Artem Can MD;  Location: [facility];  Service: Neurosurgery;  Laterality: Right;    ESOPHAGOGASTRODUODENOSCOPY W/ PEG N/A 1/2/2024    Procedure: PEG;  Surgeon: Tani Day MD;  Location: [facility] ENDOSCOPY;  Service: Gastroenterology;  Laterality: N/A;    excision of colon      TRACHEOSTOMY N/A 12/29/2023    Procedure: CREATION, TRACHEOSTOMY;  Surgeon: Patricia Winslow MD;  Location: [facility] OR;  Service: ENT;  Laterality: N/A;  REQ 1130 //  NEEDS 2 SCRUBS     Family History   Problem Relation Name Age of Onset    Hypertension Mother      Hypertension Father      Hypertension  Sister       Social History     Tobacco Use    Smoking status: Never    Smokeless tobacco: Never   Substance Use Topics    Alcohol use: Not Currently    Drug use: Not Currently     Review of Systems   Unable to perform ROS: Patient nonverbal       Physical Exam     Initial Vitals [08/19/24 0106]   BP Pulse Resp Temp SpO2   (!) 78/52 (!) 132 (!) 30 99.9 °F (37.7 °C) 98 %      MAP       --         Physical Exam    Constitutional: He appears well-developed.   Ill appearing elderly black male   HENT:   Head: Normocephalic and atraumatic.   Eyes: Pupils are equal, round, and reactive to light.   Cardiovascular:            Tachycardic irregular heart rate in the 130s   Pulmonary/Chest:   Coarse breath sounds bilaterally   Abdominal: Abdomen is soft. He exhibits no distension. There is no abdominal tenderness.     Skin: Skin is warm and dry.         ED Course   Critical Care    Date/Time: 8/19/2024 3:54 AM    Performed by: Manny Snyder MD  Authorized by: Manny Snyder MD  Total critical care time (exclusive of procedural time) : 0 minutes  Critical care time was exclusive of separately billable procedures and treating other patients and teaching time.  Critical care was necessary to treat or prevent imminent or life-threatening deterioration of the following conditions: circulatory failure.  Critical care was time spent personally by me on the following activities: interpretation of cardiac output measurements, evaluation of patient's response to treatment, examination of patient, obtaining history from patient or surrogate, ordering and performing treatments and interventions, ordering and review of laboratory studies, ordering and review of radiographic studies, pulse oximetry and re-evaluation of patient's condition.        Labs Reviewed   COMPREHENSIVE METABOLIC PANEL - Abnormal       Result Value    Sodium 142      Potassium 3.7      Chloride 99      CO2 27      Glucose 92      Blood Urea Nitrogen  19.3      Creatinine 0.88      Calcium 9.3      Protein Total 7.6      Albumin 3.6      Globulin 4.0 (*)     Albumin/Globulin Ratio 0.9 (*)     Bilirubin Total 1.2       (*)     ALT 26      AST 31      eGFR >60      Anion Gap 16.0      BUN/Creatinine Ratio 22     B-TYPE NATRIURETIC PEPTIDE - Abnormal    Natriuretic Peptide 249.1 (*)    LACTIC ACID, PLASMA - Abnormal    Lactic Acid Level 2.6 (*)    PROTIME-INR - Abnormal    PT 16.7 (*)     INR 1.4 (*)    CBC WITH DIFFERENTIAL - Abnormal    WBC 16.45 (*)     RBC 5.54      Hgb 14.6      Hct 45.9      MCV 82.9      MCH 26.4 (*)     MCHC 31.8 (*)     RDW 16.0      Platelet 246      MPV 10.6 (*)     Neut % 82.1      Lymph % 12.4      Mono % 4.2      Eos % 0.2      Basophil % 0.4      Lymph # 2.04      Neut # 13.51 (*)     Mono # 0.69      Eos # 0.04      Baso # 0.06      IG# 0.11 (*)     IG% 0.7      NRBC% 0.0     APTT - Normal    PTT 27.0     COVID/RSV/FLU A&B PCR - Normal    Influenza A PCR Not Detected      Influenza B PCR Not Detected      Respiratory Syncytial Virus PCR Not Detected      SARS-CoV-2 PCR Not Detected      Narrative:     The Xpert Xpress SARS-CoV-2/FLU/RSV plus is a rapid, multiplexed real-time PCR test intended for the simultaneous qualitative detection and differentiation of SARS-CoV-2, Influenza A, Influenza B, and respiratory syncytial virus (RSV) viral RNA in either nasopharyngeal swab or nasal swab specimens.         TROPONIN I - Normal    Troponin-I 0.035     TSH - Normal    TSH 3.991     MAGNESIUM - Normal    Magnesium Level 2.00     LACTIC ACID, PLASMA - Normal    Lactic Acid Level 2.1     BLOOD CULTURE [facility]   BLOOD CULTURE [facility]   CBC W/ AUTO DIFFERENTIAL    Narrative:     The following orders were created for panel order CBC auto differential.  Procedure                               Abnormality         Status                     ---------                               -----------         ------                     CBC with  "Differential[[id number]]       Abnormal            Final result                 Please view results for these tests on the individual orders.   URINALYSIS, REFLEX TO URINE CULTURE     EKG Readings: (Independently Interpreted)   Atrial fibrillation with a rate of 125 rapid ventricular response STEMI       Imaging Results              X-Ray Chest 1 View (In process)                   X-Rays:   Independently Interpreted Readings:   Other Readings:  Cardiomegaly right lower lobe airspace opacities    Medications   sodium chloride 0.9% bolus 2,190 mL 2,190 mL (has no administration in time range)   piperacillin-tazobactam (ZOSYN) 4.5 g in D5W 100 mL IVPB (MB+) (4.5 g Intravenous New Bag 8/19/24 0258)   vancomycin 1.75 g in 5 % dextrose 500 mL IVPB (1,750 mg Intravenous New Bag 8/19/24 0351)   lactated ringers bolus 1,000 mL (0 mLs Intravenous Stopped 8/19/24 0257)   pantoprazole injection 80 mg (80 mg Intravenous Given 8/19/24 0139)   acetaminophen 1,000 mg/100 mL (10 mg/mL) injection 1,000 mg (0 mg Intravenous Stopped 8/19/24 0343)   digoxin injection 250 mcg (250 mcg Intravenous Given 8/19/24 0331)     Medical Decision Making  Amount and/or Complexity of Data Reviewed  Labs: ordered.  Radiology: ordered.    Risk  Prescription drug management.               ED Course as of 08/19/24 0441   Mon Aug 19, 2024   0127 Echo from January 2024 was reviewed ejection fraction is 55% [NL]   0355 Patient was given 30 cc/kilos lactated Ringer's for ideal body weight [NL]   0355 Patient has some brownish vomitus on pillow at the bedside tested positive for occult blood [NL]      ED Course User Index  [NL] Manny Snyder MD               Medical Decision Making:   Clinical Tests:   Sepsis Perfusion Assessment: "I attest a sepsis perfusion exam was performed within 6 hours of sepsis, severe sepsis, or septic shock presentation, following fluid resuscitation."             Clinical Impression:  Final diagnoses:  [R05.9] " Cough  [I48.91] A-fib  [K92.2] Gastrointestinal hemorrhage, unspecified gastrointestinal hemorrhage type (Primary)  [J18.9] Pneumonia due to infectious organism, unspecified laterality, unspecified part of lung                 Manny Snyder MD  08/19/24 5251

## 2024-08-20 LAB
ALBUMIN SERPL-MCNC: 2.9 G/DL (ref 3.4–4.8)
ALBUMIN SERPL-MCNC: 3 G/DL (ref 3.4–4.8)
ALBUMIN/GLOB SERPL: 0.8 RATIO (ref 1.1–2)
ALBUMIN/GLOB SERPL: 0.9 RATIO (ref 1.1–2)
ALLENS TEST BLOOD GAS (OHS): YES
ALP SERPL-CCNC: 116 UNIT/L (ref 40–150)
ALP SERPL-CCNC: 124 UNIT/L (ref 40–150)
ALT SERPL-CCNC: 19 UNIT/L (ref 0–55)
ALT SERPL-CCNC: 19 UNIT/L (ref 0–55)
AMORPH URATE CRY URNS QL MICRO: ABNORMAL /UL
ANION GAP SERPL CALC-SCNC: 11 MEQ/L
ANION GAP SERPL CALC-SCNC: 13 MEQ/L
AST SERPL-CCNC: 20 UNIT/L (ref 5–34)
AST SERPL-CCNC: 23 UNIT/L (ref 5–34)
BACTERIA #/AREA URNS AUTO: ABNORMAL /HPF
BASE EXCESS BLD CALC-SCNC: 10 MMOL/L (ref -2–2)
BASOPHILS # BLD AUTO: 0.05 X10(3)/MCL
BASOPHILS NFR BLD AUTO: 0.3 %
BILIRUB SERPL-MCNC: 0.9 MG/DL
BILIRUB SERPL-MCNC: 1.1 MG/DL
BILIRUB UR QL STRIP.AUTO: NEGATIVE
BLOOD GAS SAMPLE TYPE (OHS): ABNORMAL
BUN SERPL-MCNC: 10.6 MG/DL (ref 8.4–25.7)
BUN SERPL-MCNC: 11.4 MG/DL (ref 8.4–25.7)
CA-I BLD-SCNC: 0.99 MMOL/L (ref 1.12–1.23)
CALCIUM SERPL-MCNC: 8.5 MG/DL (ref 8.8–10)
CALCIUM SERPL-MCNC: 9 MG/DL (ref 8.8–10)
CHLORIDE SERPL-SCNC: 103 MMOL/L (ref 98–107)
CHLORIDE SERPL-SCNC: 106 MMOL/L (ref 98–107)
CLARITY UR: CLEAR
CO2 BLDA-SCNC: 35.7 MMOL/L
CO2 SERPL-SCNC: 22 MMOL/L (ref 23–31)
CO2 SERPL-SCNC: 28 MMOL/L (ref 23–31)
COHGB MFR BLDA: 1.3 % (ref 0.5–1.5)
COLOR UR AUTO: YELLOW
CREAT SERPL-MCNC: 0.79 MG/DL (ref 0.73–1.18)
CREAT SERPL-MCNC: 0.8 MG/DL (ref 0.73–1.18)
CREAT/UREA NIT SERPL: 13
CREAT/UREA NIT SERPL: 14
DRAWN BY BLOOD GAS (OHS): ABNORMAL
EOSINOPHIL # BLD AUTO: 0.03 X10(3)/MCL (ref 0–0.9)
EOSINOPHIL NFR BLD AUTO: 0.2 %
ERYTHROCYTE [DISTWIDTH] IN BLOOD BY AUTOMATED COUNT: 15.8 % (ref 11.5–17)
GFR SERPLBLD CREATININE-BSD FMLA CKD-EPI: >60 ML/MIN/1.73/M2
GFR SERPLBLD CREATININE-BSD FMLA CKD-EPI: >60 ML/MIN/1.73/M2
GLOBULIN SER-MCNC: 3.4 GM/DL (ref 2.4–3.5)
GLOBULIN SER-MCNC: 3.8 GM/DL (ref 2.4–3.5)
GLUCOSE SERPL-MCNC: 122 MG/DL (ref 82–115)
GLUCOSE SERPL-MCNC: 137 MG/DL (ref 82–115)
GLUCOSE UR QL STRIP: NORMAL
HCO3 BLDA-SCNC: 34.3 MMOL/L (ref 22–26)
HCT VFR BLD AUTO: 40.8 % (ref 42–52)
HGB BLD-MCNC: 12.4 G/DL (ref 14–18)
HGB UR QL STRIP: ABNORMAL
IMM GRANULOCYTES # BLD AUTO: 0.08 X10(3)/MCL (ref 0–0.04)
IMM GRANULOCYTES NFR BLD AUTO: 0.5 %
INHALED O2 CONCENTRATION: 100 %
KETONES UR QL STRIP: NEGATIVE
LACTATE SERPL-SCNC: 2.4 MMOL/L (ref 0.5–2.2)
LACTATE SERPL-SCNC: 3.1 MMOL/L (ref 0.5–2.2)
LEUKOCYTE ESTERASE UR QL STRIP: NEGATIVE
LYMPHOCYTES # BLD AUTO: 1.39 X10(3)/MCL (ref 0.6–4.6)
LYMPHOCYTES NFR BLD AUTO: 9.2 %
MAGNESIUM SERPL-MCNC: 1.9 MG/DL (ref 1.6–2.6)
MAGNESIUM SERPL-MCNC: 2 MG/DL (ref 1.6–2.6)
MCH RBC QN AUTO: 26.3 PG (ref 27–31)
MCHC RBC AUTO-ENTMCNC: 30.4 G/DL (ref 33–36)
MCV RBC AUTO: 86.4 FL (ref 80–94)
MECH RR (OHS): 20 B/MIN
METHGB MFR BLDA: 1.1 % (ref 0.4–1.5)
MODE (OHS): AC
MONOCYTES # BLD AUTO: 0.77 X10(3)/MCL (ref 0.1–1.3)
MONOCYTES NFR BLD AUTO: 5.1 %
MUCOUS THREADS URNS QL MICRO: ABNORMAL /LPF
NEUTROPHILS # BLD AUTO: 12.76 X10(3)/MCL (ref 2.1–9.2)
NEUTROPHILS NFR BLD AUTO: 84.7 %
NITRITE UR QL STRIP: NEGATIVE
NRBC BLD AUTO-RTO: 0 %
O2 HB BLOOD GAS (OHS): 97.3 % (ref 94–97)
OVALOCYTES (OLG): ABNORMAL
OXYGEN DEVICE BLOOD GAS (OHS): ABNORMAL
OXYHGB MFR BLDA: 11.5 G/DL (ref 12–16)
PCO2 BLDA: 44 MMHG (ref 35–45)
PEEP RESPIRATORY: 5 CMH2O
PH BLDA: 7.5 [PH] (ref 7.35–7.45)
PH UR STRIP: 8 [PH]
PHOSPHATE SERPL-MCNC: 2.6 MG/DL (ref 2.3–4.7)
PHOSPHATE SERPL-MCNC: 4.5 MG/DL (ref 2.3–4.7)
PLATELET # BLD AUTO: 182 X10(3)/MCL (ref 130–400)
PLATELET # BLD EST: NORMAL 10*3/UL
PMV BLD AUTO: 10 FL (ref 7.4–10.4)
PO2 BLDA: 435 MMHG (ref 80–100)
POIKILOCYTOSIS BLD QL SMEAR: ABNORMAL
POTASSIUM BLOOD GAS (OHS): 3.2 MMOL/L (ref 3.5–5)
POTASSIUM SERPL-SCNC: 3.8 MMOL/L (ref 3.5–5.1)
POTASSIUM SERPL-SCNC: 4.1 MMOL/L (ref 3.5–5.1)
PROT SERPL-MCNC: 6.3 GM/DL (ref 5.8–7.6)
PROT SERPL-MCNC: 6.8 GM/DL (ref 5.8–7.6)
PROT UR QL STRIP: ABNORMAL
RBC # BLD AUTO: 4.72 X10(6)/MCL (ref 4.7–6.1)
RBC #/AREA URNS AUTO: >100 /HPF
RBC MORPH BLD: ABNORMAL
SAMPLE SITE BLOOD GAS (OHS): ABNORMAL
SAO2 % BLDA: 100 %
SODIUM BLOOD GAS (OHS): 136 MMOL/L (ref 137–145)
SODIUM SERPL-SCNC: 141 MMOL/L (ref 136–145)
SODIUM SERPL-SCNC: 142 MMOL/L (ref 136–145)
SP GR UR STRIP.AUTO: 1.03 (ref 1–1.03)
SPONT+MECH VT ON VENT: 500 ML
SQUAMOUS #/AREA URNS LPF: ABNORMAL /HPF
UROBILINOGEN UR STRIP-ACNC: NORMAL
VANCOMYCIN TROUGH SERPL-MCNC: 36.4 UG/ML (ref 15–20)
WBC # BLD AUTO: 15.08 X10(3)/MCL (ref 4.5–11.5)
WBC #/AREA URNS AUTO: ABNORMAL /HPF

## 2024-08-20 PROCEDURE — 83605 ASSAY OF LACTIC ACID: CPT

## 2024-08-20 PROCEDURE — 25000003 PHARM REV CODE 250: Performed by: INTERNAL MEDICINE

## 2024-08-20 PROCEDURE — 99900022

## 2024-08-20 PROCEDURE — 99900035 HC TECH TIME PER 15 MIN (STAT)

## 2024-08-20 PROCEDURE — 94761 N-INVAS EAR/PLS OXIMETRY MLT: CPT | Mod: XB

## 2024-08-20 PROCEDURE — 81001 URINALYSIS AUTO W/SCOPE: CPT | Performed by: EMERGENCY MEDICINE

## 2024-08-20 PROCEDURE — 99900031 HC PATIENT EDUCATION (STAT)

## 2024-08-20 PROCEDURE — 94760 N-INVAS EAR/PLS OXIMETRY 1: CPT | Mod: XB

## 2024-08-20 PROCEDURE — 20000000 HC ICU ROOM

## 2024-08-20 PROCEDURE — 36600 WITHDRAWAL OF ARTERIAL BLOOD: CPT

## 2024-08-20 PROCEDURE — 27200966 HC CLOSED SUCTION SYSTEM

## 2024-08-20 PROCEDURE — 25000003 PHARM REV CODE 250

## 2024-08-20 PROCEDURE — 99900026 HC AIRWAY MAINTENANCE (STAT)

## 2024-08-20 PROCEDURE — 94003 VENT MGMT INPAT SUBQ DAY: CPT

## 2024-08-20 PROCEDURE — 80202 ASSAY OF VANCOMYCIN: CPT | Performed by: NURSE PRACTITIONER

## 2024-08-20 PROCEDURE — 63600175 PHARM REV CODE 636 W HCPCS: Performed by: NURSE PRACTITIONER

## 2024-08-20 PROCEDURE — 83735 ASSAY OF MAGNESIUM: CPT | Performed by: NURSE PRACTITIONER

## 2024-08-20 PROCEDURE — 27100171 HC OXYGEN HIGH FLOW UP TO 24 HOURS

## 2024-08-20 PROCEDURE — 85025 COMPLETE CBC W/AUTO DIFF WBC: CPT | Performed by: NURSE PRACTITIONER

## 2024-08-20 PROCEDURE — 84100 ASSAY OF PHOSPHORUS: CPT | Performed by: NURSE PRACTITIONER

## 2024-08-20 PROCEDURE — 82803 BLOOD GASES ANY COMBINATION: CPT

## 2024-08-20 PROCEDURE — 87186 SC STD MICRODIL/AGAR DIL: CPT

## 2024-08-20 PROCEDURE — 36415 COLL VENOUS BLD VENIPUNCTURE: CPT | Performed by: NURSE PRACTITIONER

## 2024-08-20 PROCEDURE — 25000003 PHARM REV CODE 250: Performed by: NURSE PRACTITIONER

## 2024-08-20 PROCEDURE — 80053 COMPREHEN METABOLIC PANEL: CPT | Performed by: NURSE PRACTITIONER

## 2024-08-20 RX ORDER — MICONAZOLE NITRATE 2 G/100G
POWDER TOPICAL 2 TIMES DAILY
Status: DISCONTINUED | OUTPATIENT
Start: 2024-08-20 | End: 2024-08-23 | Stop reason: HOSPADM

## 2024-08-20 RX ADMIN — METOPROLOL TARTRATE 100 MG: 50 TABLET, FILM COATED ORAL at 08:08

## 2024-08-20 RX ADMIN — LEVETIRACETAM 1500 MG: 100 SOLUTION ORAL at 08:08

## 2024-08-20 RX ADMIN — DILTIAZEM HYDROCHLORIDE 60 MG: 60 TABLET, FILM COATED ORAL at 11:08

## 2024-08-20 RX ADMIN — PIPERACILLIN SODIUM AND TAZOBACTAM SODIUM 4.5 G: 4; .5 INJECTION, POWDER, LYOPHILIZED, FOR SOLUTION INTRAVENOUS at 11:08

## 2024-08-20 RX ADMIN — MICONAZOLE NITRATE 2 % TOPICAL POWDER: at 08:08

## 2024-08-20 RX ADMIN — BUSPIRONE HYDROCHLORIDE 5 MG: 5 TABLET ORAL at 08:08

## 2024-08-20 RX ADMIN — SUCRALFATE 1 G: 1 TABLET ORAL at 04:08

## 2024-08-20 RX ADMIN — SUCRALFATE 1 G: 1 TABLET ORAL at 05:08

## 2024-08-20 RX ADMIN — DILTIAZEM HYDROCHLORIDE 60 MG: 60 TABLET, FILM COATED ORAL at 05:08

## 2024-08-20 RX ADMIN — QUETIAPINE FUMARATE 25 MG: 25 TABLET ORAL at 08:08

## 2024-08-20 RX ADMIN — SUCRALFATE 1 G: 1 TABLET ORAL at 08:08

## 2024-08-20 RX ADMIN — Medication: at 03:08

## 2024-08-20 RX ADMIN — DILTIAZEM HYDROCHLORIDE 60 MG: 60 TABLET, FILM COATED ORAL at 06:08

## 2024-08-20 RX ADMIN — CHOLESTYRAMINE 4 G: 4 POWDER, FOR SUSPENSION ORAL at 01:08

## 2024-08-20 RX ADMIN — FINASTERIDE 5 MG: 5 TABLET, FILM COATED ORAL at 08:08

## 2024-08-20 RX ADMIN — VANCOMYCIN HYDROCHLORIDE 1250 MG: 1.25 INJECTION, POWDER, LYOPHILIZED, FOR SOLUTION INTRAVENOUS at 03:08

## 2024-08-20 RX ADMIN — DILTIAZEM HYDROCHLORIDE 60 MG: 60 TABLET, FILM COATED ORAL at 12:08

## 2024-08-20 RX ADMIN — PIPERACILLIN SODIUM AND TAZOBACTAM SODIUM 4.5 G: 4; .5 INJECTION, POWDER, LYOPHILIZED, FOR SOLUTION INTRAVENOUS at 07:08

## 2024-08-20 RX ADMIN — SUCRALFATE 1 G: 1 TABLET ORAL at 11:08

## 2024-08-20 RX ADMIN — TAMSULOSIN HYDROCHLORIDE 0.4 MG: 0.4 CAPSULE ORAL at 08:08

## 2024-08-20 RX ADMIN — BUSPIRONE HYDROCHLORIDE 5 MG: 5 TABLET ORAL at 03:08

## 2024-08-20 RX ADMIN — PANTOPRAZOLE SODIUM 40 MG: 40 INJECTION, POWDER, LYOPHILIZED, FOR SOLUTION INTRAVENOUS at 08:08

## 2024-08-20 RX ADMIN — PIPERACILLIN SODIUM AND TAZOBACTAM SODIUM 4.5 G: 4; .5 INJECTION, POWDER, LYOPHILIZED, FOR SOLUTION INTRAVENOUS at 02:08

## 2024-08-20 RX ADMIN — ONDANSETRON 4 MG: 2 INJECTION INTRAMUSCULAR; INTRAVENOUS at 05:08

## 2024-08-20 RX ADMIN — Medication: at 08:08

## 2024-08-20 RX ADMIN — VENLAFAXINE HYDROCHLORIDE 75 MG: 37.5 CAPSULE, EXTENDED RELEASE ORAL at 08:08

## 2024-08-20 NOTE — PROGRESS NOTES
Pharmacokinetic Assessment Follow Up: IV Vancomycin    Vancomycin serum concentration assessment(s):  The trough level was drawn incorrectly and cannot be used to guide therapy at this time.    Vancomycin Regimen Plan:  Continue regimen to Vancomycin 1250 mg IV every 12 hours with next serum trough concentration measured at 0300 prior to 0400 dose on 08/21    Scheduled Administration Times  0400  1600    Drug levels (last 3 results):  Recent Labs   Lab Result Units 08/20/24  1556   Vancomycin Trough ug/ml 36.4*       Vancomycin Administrations:  vancomycin given in the last 96 hours                     vancomycin 1.25 g in dextrose 5% 250 mL IVPB (ready to mix) (mg) 1,250 mg New Bag 08/20/24 1517     1,250 mg New Bag  0334     1,250 mg New Bag 08/19/24 1804    vancomycin 1.75 g in 5 % dextrose 500 mL IVPB (mg) 1,750 mg New Bag 08/19/24 0351                    Pharmacy will continue to follow and monitor vancomycin.    Please contact pharmacy at extension [phone] for questions regarding this assessment.    Thank you for the consult,   Nitza Durand       Patient brief summary:  Delio Daniel Jr. is a 69 y.o. male initiated on antimicrobial therapy with IV Vancomycin for treatment of  pneumonia    The patient's current regimen is 1250 mg IVPB Q12H    Drug Allergies:   Review of patient's allergies indicates:  No Known Allergies    Actual Body Weight:  Wt Readings from Last 1 Encounters:   08/19/24 90.7 kg (200 lb)       Renal Function:   Estimated Creatinine Clearance: 101.4 mL/min (based on SCr of 0.79 mg/dL).,     Dialysis Method (if applicable):  N/A    CBC (last 72 hours):  Recent Labs   Lab Result Units 08/19/24  0204 08/19/24  2344 08/20/24  0457   WBC x10(3)/mcL 16.45* 19.53* 15.08*   Hgb g/dL 14.6 13.0* 12.4*   Hct % 45.9 45.3 40.8*   Platelet x10(3)/mcL 246 187 182   Mono % % 4.2 5.1 5.1   Eos % % 0.2 0.4 0.2   Basophil % % 0.4 0.5 0.3       Metabolic Panel (last 72 hours):  Recent Labs   Lab Result Units  08/19/24  0204 08/19/24  2244 08/19/24  2344 08/20/24  0011 08/20/24  0456 08/20/24  1351   Sodium mmol/L 142  --  141  --  142  --    Sodium, Blood Gas mmol/L  --  137  --  136*  --   --    Potassium mmol/L 3.7  --  3.8  --  4.1  --    Potassium, Blood Gas mmol/L  --  3.6  --  3.2*  --   --    Chloride mmol/L 99  --  106  --  103  --    CO2 mmol/L 27  --  22*  --  28  --    Glucose mg/dL 92  --  137*  --  122*  --    Glucose, UA   --   --   --   --   --  Normal   Blood Urea Nitrogen mg/dL 19.3  --  11.4  --  10.6  --    Creatinine mg/dL 0.88  --  0.80  --  0.79  --    Albumin g/dL 3.6  --  3.0*  --  2.9*  --    Bilirubin Total mg/dL 1.2  --  0.9  --  1.1  --    ALP unit/L 152*  --  124  --  116  --    AST unit/L 31  --  23  --  20  --    ALT unit/L 26  --  19  --  19  --    Magnesium Level mg/dL 2.00  --  2.00  --  1.90  --    Phosphorus Level mg/dL  --   --  4.5  --  2.6  --        Microbiologic Results:  Microbiology Results (last 7 days)       Procedure Component Value Units Date/Time    Respiratory Culture [[id number]] Collected: 08/20/24 0126    Order Status: Completed Specimen: Sputum from Endotracheal Aspirate Updated: 08/20/24 0755     GRAM STAIN Quality 2+      Many Gram Positive Rods      Few Yeast      Few Gram positive cocci    Blood culture #1 **CANNOT BE ORDERED STAT** [[id number]]  (Normal) Collected: 08/19/24 0231    Order Status: Completed Specimen: Blood Updated: 08/20/24 0300     Blood Culture No Growth At 24 Hours    Blood culture #2 **CANNOT BE ORDERED STAT** [[id number]]  (Normal) Collected: 08/19/24 0231    Order Status: Completed Specimen: Blood Updated: 08/20/24 0300     Blood Culture No Growth At 24 Hours

## 2024-08-20 NOTE — NURSING
Nurses Note -- 4 Eyes      8/19/2024   11:23 PM      Skin assessed during: Q Shift Change      [] No Altered Skin Integrity Present    []Prevention Measures Documented      [x] Yes- Altered Skin Integrity Present or Discovered   [] LDA Added if Not in Epic (Describe Wound)   [x] New Altered Skin Integrity was Present on Admit and Documented in LDA   [x] Wound Image Taken    Wound Care Consulted? Yes    Attending Nurse:  Francis Johnson RN/Staff Member:  Nilam

## 2024-08-20 NOTE — PROGRESS NOTES
[facility] - 7th Floor ICU  Wound Care    Patient Name:  Delio Daniel Jr.   MRN:  [id number]  Date: 8/20/2024  Diagnosis: <principal problem not specified>    History:     Past Medical History:   Diagnosis Date    Arthritis     Atrial fibrillation     BPH (benign prostatic hyperplasia)     Cardiac arrest     Coronary artery disease     Cyst, kidney, acquired     Diverticulosis     Hyperlipidemia     Hypertension     MI (myocardial infarction)     Obesity     Steatosis of liver     Stroke        Social History     Socioeconomic History    Marital status:     Number of children: 9   Occupational History    Occupation: retired   Tobacco Use    Smoking status: Never    Smokeless tobacco: Never   Substance and Sexual Activity    Alcohol use: Not Currently    Drug use: Not Currently    Sexual activity: Not Currently     Partners: Female     Social Determinants of Health     Financial Resource Strain: Low Risk  (8/5/2024)    Overall Financial Resource Strain (CARDIA)     Difficulty of Paying Living Expenses: Not very hard   Food Insecurity: No Food Insecurity (8/5/2024)    Hunger Vital Sign     Worried About Running Out of Food in the Last Year: Never true     Ran Out of Food in the Last Year: Never true   Transportation Needs: No Transportation Needs (8/5/2024)    TRANSPORTATION NEEDS     Transportation : No   Physical Activity: Sufficiently Active (8/5/2024)    Exercise Vital Sign     Days of Exercise per Week: 5 days     Minutes of Exercise per Session: 30 min   Stress: Patient Unable To Answer (8/5/2024)    [employer] [place] [employer] - Occupational Stress Questionnaire     Feeling of Stress : Patient unable to answer   Housing Stability: Low Risk  (8/5/2024)    Housing Stability Vital Sign     Unable to Pay for Housing in the Last Year: No     Homeless in the Last Year: No       Precautions:     Allergies as of 08/19/2024    (No Known Allergies)       WOC Assessment Details/Treatment         08/20/24 1146        Altered Skin Integrity 02/26/24 1100 lower Buttocks Moisture associated dermatitis   Date First Assessed/Time First Assessed: 02/26/24 1100   Altered Skin Integrity Present on Admission - Did Patient arrive to the hospital with altered skin?: suspected hospital acquired  Orientation: lower  Location: Buttocks  Primary Wound Type: Mois...   Wound Image    Dressing Appearance Open to air   Drainage Amount None   Appearance Red;Moist   Black (%), Wound Tissue Color 0 %   Red (%), Wound Tissue Color 100 %   Yellow (%), Wound Tissue Color 0 %   Periwound Area Macerated   Wound Edges Undefined   Care Cleansed with:;Soap and water        Wound 08/19/24 1500 Pressure Injury Left lateral Ankle   Date First Assessed/Time First Assessed: 08/19/24 1500   Present on Original Admission: Yes  Primary Wound Type: Pressure Injury  Side: Left  Orientation: lateral  Location: Ankle   Dressing Appearance Open to air   Drainage Amount None   Appearance Fibrin;Maroon   Periwound Area Intact;Dry   Wound Edges Irregular        Wound 08/05/24 1420 Pressure Injury Left Knee   Date First Assessed/Time First Assessed: 08/05/24 1420   Present on Original Admission: Yes  Primary Wound Type: Pressure Injury  Side: Left  Location: Knee   Wound Image    Pressure Injury Stage 3   Dressing Appearance Moist drainage   Drainage Amount Small   Drainage Characteristics/Odor Serosanguineous   Appearance Pink;Red   Tissue loss description Partial thickness   Black (%), Wound Tissue Color 0 %   Red (%), Wound Tissue Color 95 %   Yellow (%), Wound Tissue Color 5 %   Periwound Area Dry   Wound Edges Irregular   Wound Length (cm) 0.6 cm   Wound Width (cm) 1 cm   Wound Depth (cm) 0.1 cm   Wound Volume (cm^3) 0.06 cm^3   Wound Surface Area (cm^2) 0.6 cm^2   Care Cleansed with:;Soap and water   Dressing Foam        Wound 08/05/24 1420 Moisture associated dermatitis Right Groin   Date First Assessed/Time First Assessed: 08/05/24 1420    Present on Original Admission: Yes  Primary Wound Type: Moisture associated dermatitis  Side: Right  Location: Groin   Wound Image    Dressing Appearance Open to air   Drainage Amount None   Appearance Pink;Moist   Black (%), Wound Tissue Color 0 %   Red (%), Wound Tissue Color 100 %   Yellow (%), Wound Tissue Color 0 %   Periwound Area Macerated   Wound Edges Undefined   Care Cleansed with:;Soap and water        Wound 08/19/24 1500 Pressure Injury Left anterior Leg   Date First Assessed/Time First Assessed: 08/19/24 1500   Present on Original Admission: Yes  Primary Wound Type: Pressure Injury  Side: Left  Orientation: anterior  Location: Leg   Wound Image    Dressing Appearance Open to air   Drainage Amount None   Appearance Fibrin;Maroon   Black (%), Wound Tissue Color 0 %   Red (%), Wound Tissue Color 100 %   Yellow (%), Wound Tissue Color 0 %     Pt presented to hospital from nursing home with multiple wounds. Moisture associated dermatitis to groin and sacral area: cleanse with soap and water dry thoroughly, apply nystatin powder to groin and zinc oxide and an abd to sacrum, BID and PRN with soilage. Left knee: cleanse with soap and water apply foam dressing every other day. Leave abrasions to left shin open to air. No family at bedside, pt not able to participate in education. Plan of care discussed with nurseAllegra. Continue to turn q2 hours and offload bony prominences.       08/20/2024

## 2024-08-20 NOTE — H&P
[facility] - 7th Floor ICU  Pulmonary Critical Care Note    Patient Name: Delio Daniel Jr.  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 Pt resides in nursing home. Presented to ED from NH 8/19/24 due to concern for coffee ground emesis from mouth/trach. On initial presentation, he was found to be febrile to 101.4F, tachycardic, hypotensive, saturating 98% on 4L trach collar. labs with leukocytosis of 16,000, H/H 14.6/45.9, INR 1.4, .1, troponin 0.035, and lactic acid 2.6. EKG revealed atrial fibrillation with RVR and heart rate of 125. CT chest/abdomen/pelvis with IV contrast demonstrated bilateral lung infiltrates, which may represent developing infectious process, normal esophagus and stomach. In ED, emesis tested + for occult blood. Given Vanc, Zosyn, IV Protonix, IV Digoxin 250mcg. Admitted to Hospital Medicine. GI consulted; given Hgb normal, no plans for endoscopy. Recommended continued PPI and slow tube feeds.    While on floor, nursing staff noted change in mentation with patient becoming more obtunded. Rapid response called. ABG revealed pH 7.09, pCO2 >115. Pt placed on mechanical ventilation and upgraded to ICU.    Hospital Course/Significant events:  -admitted to ICU 8/19    24 Hour Interval History:  N/a    Past Medical History:   Diagnosis Date    Arthritis     Atrial fibrillation     BPH (benign prostatic hyperplasia)     Cardiac arrest     Coronary artery disease     Cyst, kidney, acquired     Diverticulosis     Hyperlipidemia      Hypertension     MI (myocardial infarction)     Obesity     Steatosis of liver     Stroke        Past Surgical History:   Procedure Laterality Date    A-V CARDIAC PACEMAKER INSERTION Right     CARDIAC CATHETERIZATION      COLONOSCOPY W/ BIOPSIES      CRANIECTOMY Right 12/20/2023    Procedure: CRANIECTOMY;  Surgeon: Artem Can MD;  Location: [facility] OR;  Service: Neurosurgery;  Laterality: Right;    ESOPHAGOGASTRODUODENOSCOPY W/ PEG N/A 1/2/2024    Procedure: PEG;  Surgeon: Tani Day MD;  Location: [facility] ENDOSCOPY;  Service: Gastroenterology;  Laterality: N/A;    excision of colon      TRACHEOSTOMY N/A 12/29/2023    Procedure: CREATION, TRACHEOSTOMY;  Surgeon: Patricia Winslow MD;  Location: [facility] OR;  Service: ENT;  Laterality: N/A;  REQ 1130 //  NEEDS 2 SCRUBS       Social History     Socioeconomic History    Marital status:     Number of children: 9   Occupational History    Occupation: retired   Tobacco Use    Smoking status: Never    Smokeless tobacco: Never   Substance and Sexual Activity    Alcohol use: Not Currently    Drug use: Not Currently    Sexual activity: Not Currently     Partners: Female     Social Determinants of Health     Financial Resource Strain: Low Risk  (8/5/2024)    Overall Financial Resource Strain (CARDIA)     Difficulty of Paying Living Expenses: Not very hard   Food Insecurity: No Food Insecurity (8/5/2024)    Hunger Vital Sign     Worried About Running Out of Food in the Last Year: Never true     Ran Out of Food in the Last Year: Never true   Transportation Needs: No Transportation Needs (8/5/2024)    TRANSPORTATION NEEDS     Transportation : No   Physical Activity: Sufficiently Active (8/5/2024)    Exercise Vital Sign     Days of Exercise per Week: 5 days     Minutes of Exercise per Session: 30 min   Stress: Patient Unable To Answer (8/5/2024)    [employer] - Occupational Stress Questionnaire     Feeling of Stress : Patient unable to  answer   Housing Stability: Low Risk  (8/5/2024)    Housing Stability Vital Sign     Unable to Pay for Housing in the Last Year: No     Homeless in the Last Year: No           Current Outpatient Medications   Medication Instructions    ascorbic Acid (VITAMIN C) 500 mg, 2 times daily, Per PEG tube    busPIRone (BUSPAR) 5 mg, Per G Tube, 3 times daily    cholestyramine (QUESTRAN) 4 gram packet 4 g, Daily, Via PEG tube    cholestyramine-aspartame (QUESTRAN LIGHT) 4 gram PwPk 4 g, Per G Tube, 2 times daily    diltiaZEM (CARDIZEM) 60 mg, Per G Tube, Every 6 hours    ferrous sulfate 300 mg, Oral, Daily    finasteride (PROSCAR) 5 mg, Oral, Daily    furosemide (LASIX) 80 mg, Per G Tube, As needed (PRN)    L. acidophilus/L.bulgaricus (FLORANEX ORAL) 1 packet, PEG Tube, Daily    LANSOPRAZOLE 3 MG/ML SUSP (PREVACID) 30 mg, Per G Tube, Daily    levetiracetam 1,500 mg, Per G Tube, 2 times daily, Nursing home reports medication hold by MD until level redraw on Monday.    LIPITOR 10 mg, Per G Tube, Daily    metoprolol tartrate (LOPRESSOR) 100 mg, Per G Tube, 2 times daily    multivitamin (THERAGRAN) per tablet 1 tablet, Per G Tube, Daily    protein supplement (PROMOD PROTEIN ORAL) 30 mLs, Per G Tube, Daily    QUEtiapine (SEROQUEL) 25 mg, Per G Tube, 2 times daily    scopolamine (TRANSDERM-SCOP) 1.3-1.5 mg (1 mg over 3 days) 1 patch, Transdermal, Every 3 days    tamsulosin (FLOMAX) 0.4 mg, Oral, Nightly    venlafaxine 75 mg TR24 1 tablet, Per G Tube, 2 times daily    XARELTO 20 mg Tab 1 tablet, Per G Tube, Nightly       Current Inpatient Medications   busPIRone  5 mg Per G Tube TID    cholestyramine  1 packet Per G Tube Daily    diltiaZEM  60 mg Per G Tube Q6H    finasteride  5 mg Oral Daily    levetiracetam  1,500 mg Per G Tube BID    metoprolol tartrate  100 mg Per G Tube BID    [START ON 8/21/2024] mupirocin   Nasal BID    pantoprazole  40 mg Intravenous BID    piperacillin-tazobactam (Zosyn) IV (PEDS and ADULTS) (extended  infusion is not appropriate)  4.5 g Intravenous Q8H    QUEtiapine  25 mg Per G Tube BID    scopolamine  1 patch Transdermal Q3 Days    sucralfate  1 g Per G Tube QID (AC & HS)    tamsulosin  0.4 mg Oral QHS    vancomycin (VANCOCIN) IV (PEDS and ADULTS)  1,250 mg Intravenous Q12H    venlafaxine  75 mg Oral Daily       Current Intravenous Infusions   NORepinephrine bitartrate-D5W  0-3 mcg/kg/min Intravenous Continuous 3.4 mL/hr at 08/19/24 2332 0.02 mcg/kg/min at 08/19/24 2332         Review of Systems   Unable to perform ROS: Medical condition          Objective:       Intake/Output Summary (Last 24 hours) at 8/20/2024 0035  Last data filed at 8/19/2024 1801  Gross per 24 hour   Intake --   Output 250 ml   Net -250 ml         Vital Signs (Most Recent):  Temp: 98.5 °F (36.9 °C) (08/19/24 2330)  Pulse: 91 (08/19/24 2345)  Resp: 20 (08/19/24 2345)  BP: 115/74 (08/19/24 2345)  SpO2: 100 % (08/19/24 2345)  Body mass index is 28.7 kg/m².  Weight: 90.7 kg (200 lb) Vital Signs (24h Range):  Temp:  [96.6 °F (35.9 °C)-101.4 °F (38.6 °C)] 98.5 °F (36.9 °C)  Pulse:  [] 91  Resp:  [11-30] 20  SpO2:  [96 %-100 %] 100 %  BP: ()/(49-87) 115/74     Physical Exam  Vitals reviewed.   Constitutional:       Appearance: He is ill-appearing.   HENT:      Mouth/Throat:      Mouth: Mucous membranes are moist.   Eyes:      Pupils: Pupils are equal, round, and reactive to light.   Cardiovascular:      Rate and Rhythm: Tachycardia present. Rhythm irregular.      Pulses:           Radial pulses are 2+ on the right side and 2+ on the left side.      Heart sounds: No murmur heard.  Pulmonary:      Comments: Tracheostomy. Mechanical breath sounds.  Abdominal:      General: There is no distension.      Palpations: Abdomen is soft.      Tenderness: There is no abdominal tenderness.      Comments: PEG tube   Musculoskeletal:      Right lower leg: No edema.      Left lower leg: No edema.      Comments: LUE contracted   Skin:     General:  Skin is warm.   Neurological:      Mental Status: He is lethargic.      Comments: Lethargic. Nonverbal.           Lines/Drains/Airways       Drain  Duration                  Gastrostomy/Enterostomy 01/02/24 1230  days         Urethral Catheter 08/19/24 1951 <1 day              Airway  Duration             Adult Surgical Airway 08/19/24 0120 Shiley Extra Large Cuffed Distal 6.0/ 75mm <1 day              Peripheral Intravenous Line  Duration                  Peripheral IV - Single Lumen 08/19/24 0139 20 G Anterior;Distal;Right Forearm <1 day         Peripheral IV - Single Lumen 08/19/24 1530 20 G Anterior;Distal;Right Upper Arm <1 day                    Significant Labs:    Lab Results   Component Value Date    WBC 19.53 (H) 08/19/2024    HGB 13.0 (L) 08/19/2024    HCT 45.3 08/19/2024    MCV 92.6 08/19/2024     08/19/2024           BMP  Lab Results   Component Value Date     08/19/2024    K 3.7 08/19/2024    CO2 27 08/19/2024    BUN 19.3 08/19/2024    CREATININE 0.88 08/19/2024    CALCIUM 9.3 08/19/2024    AGAP 16.0 08/19/2024    EGFRNONAA 61 04/23/2022         ABG  Recent Labs   Lab 08/20/24  0011   PH 7.500*   PO2 435.0*   PCO2 44.0   HCO3 34.3*   POCBASEDEF 10.00*       Mechanical Ventilation Support:  Vent Mode: A/C (08/19/24 2315)  Set Rate: 20 BPM (08/19/24 2315)  Vt Set: 500 mL (08/19/24 2315)  PEEP/CPAP: 5 cmH20 (08/19/24 2315)  Oxygen Concentration (%): 100 (08/19/24 2330)  Peak Airway Pressure: 22 cmH20 (08/19/24 2315)  Total Ve: 10.9 L/m (08/19/24 2315)      Significant Imaging:  I have reviewed the pertinent imaging within the past 24 hours.        Assessment/Plan:     Assessment  Sepsis 2/2 PNA  Acute hypoxemic hypercapnic respiratory failure  Hematemesis  Atrial fibrillation with RVR  H/o CVA with L sided deficits (trach and PEG dependent)  H/o HTN, HLD, CAD      Plan  Admit to ICU  Tracheostomy. Now on mechanical ventilation. Wean ventilation per ARDS net protocol.   Pt hypotensive  despite IVF resuscitation. Levophed initiated. Wean pressors as tolerated to maintain MAP > 65.  Blood cultures x 2 pending. MRSA PCR +. Continue Vanc/Zosyn pending further infectious workup. Will order respiratory culture.  GI following. No endoscopy at this time given stable H/H. Recommend continue PPI and resume tube feeds at slow rate, advance as tolerated. Elevated HOB during feeds.  Home Xarelto held in setting of hematemesis. Consider resuming anticoagulation in AM.  Resume home meds as appropriate.    DVT Prophylaxis: SCD  GI Prophylaxis: IV Protonix 40mg BID     32 minutes of critical care was time spent personally by me on the following activities: development of treatment plan with patient or surrogate and bedside caregivers, discussions with consultants, evaluation of patient's response to treatment, examination of patient, ordering and performing treatments and interventions, ordering and review of laboratory studies, ordering and review of radiographic studies, pulse oximetry, re-evaluation of patient's condition.  This critical care time did not overlap with that of any other provider or involve time for any procedures.     Janina Shah MD  Pulmonary Critical Care Medicine  [facility] - 7th Floor ICU  DOS: 08/20/2024

## 2024-08-20 NOTE — NURSING
Nurses Note -- 4 Eyes      8/19/2024   1500 PM      Skin assessed during: Admit      [] No Altered Skin Integrity Present    []Prevention Measures Documented      [x] Yes- Altered Skin Integrity Present or Discovered   [x] LDA Added if Not in Epic (Describe Wound)   [x] New Altered Skin Integrity was Present on Admit and Documented in LDA   [x] Wound Image Taken    Wound Care Consulted? Yes    Attending Nurse:  MÓNICA Demarco     Second RN/Staff Member:   MÓNICA Herrera

## 2024-08-20 NOTE — NURSING
Nurses Note -- 4 Eyes      8/20/2024   2:27 PM      Skin assessed during: Q Shift Change      [] No Altered Skin Integrity Present    [x]Prevention Measures Documented      [x] Yes- Altered Skin Integrity Present or Discovered   [] LDA Added if Not in Epic (Describe Wound)   [x] New Altered Skin Integrity was Present on Admit and Documented in LDA   [x] Wound Image Taken    Wound Care Consulted? Yes    Attending Nurse:  Allegra SANTIAGO RN    Second RN/Staff Member:  RUMA Wiseman

## 2024-08-20 NOTE — PLAN OF CARE
Problem: Adult Inpatient Plan of Care  Goal: Plan of Care Review  Outcome: Progressing  Goal: Absence of Hospital-Acquired Illness or Injury  Outcome: Progressing  Goal: Optimal Comfort and Wellbeing  Outcome: Progressing  Goal: Readiness for Transition of Care  Outcome: Progressing     Problem: Sepsis/Septic Shock  Goal: Optimal Coping  Outcome: Progressing  Goal: Absence of Bleeding  Outcome: Progressing  Goal: Blood Glucose Level Within Targeted Range  Outcome: Progressing  Goal: Absence of Infection Signs and Symptoms  Outcome: Progressing  Goal: Optimal Nutrition Intake  Outcome: Progressing     Problem: Wound  Goal: Optimal Coping  Outcome: Progressing  Goal: Optimal Functional Ability  Outcome: Progressing  Goal: Absence of Infection Signs and Symptoms  Outcome: Progressing  Goal: Improved Oral Intake  Outcome: Progressing  Goal: Optimal Pain Control and Function  Outcome: Progressing  Goal: Skin Health and Integrity  Outcome: Progressing  Goal: Optimal Wound Healing  Outcome: Progressing     Problem: Skin Injury Risk Increased  Goal: Skin Health and Integrity  Outcome: Progressing     Problem: Infection  Goal: Absence of Infection Signs and Symptoms  Outcome: Progressing     Problem: Fall Injury Risk  Goal: Absence of Fall and Fall-Related Injury  Outcome: Progressing

## 2024-08-20 NOTE — PLAN OF CARE
Patient's daughter, Carmen, would like a referral for new nursing home placement sent to [facility] in [place]. Explained to her that I can do that, but that if he is medically ready to go back to [facility] before the new placement is set up, he will go back and transfer process would be continued at NH. She verbalized understanding. I sent referral via [facility].

## 2024-08-21 LAB
ALBUMIN SERPL-MCNC: 2.9 G/DL (ref 3.4–4.8)
ALBUMIN/GLOB SERPL: 1 RATIO (ref 1.1–2)
ALP SERPL-CCNC: 110 UNIT/L (ref 40–150)
ALT SERPL-CCNC: 17 UNIT/L (ref 0–55)
ANION GAP SERPL CALC-SCNC: 9 MEQ/L
AST SERPL-CCNC: 19 UNIT/L (ref 5–34)
BASOPHILS # BLD AUTO: 0.04 X10(3)/MCL
BASOPHILS NFR BLD AUTO: 0.5 %
BILIRUB SERPL-MCNC: 1.4 MG/DL
BUN SERPL-MCNC: 8.7 MG/DL (ref 8.4–25.7)
CALCIUM SERPL-MCNC: 8.4 MG/DL (ref 8.8–10)
CHLORIDE SERPL-SCNC: 104 MMOL/L (ref 98–107)
CO2 SERPL-SCNC: 29 MMOL/L (ref 23–31)
CREAT SERPL-MCNC: 0.75 MG/DL (ref 0.73–1.18)
CREAT/UREA NIT SERPL: 12
EOSINOPHIL # BLD AUTO: 0.16 X10(3)/MCL (ref 0–0.9)
EOSINOPHIL NFR BLD AUTO: 2.1 %
ERYTHROCYTE [DISTWIDTH] IN BLOOD BY AUTOMATED COUNT: 15.9 % (ref 11.5–17)
GFR SERPLBLD CREATININE-BSD FMLA CKD-EPI: >60 ML/MIN/1.73/M2
GLOBULIN SER-MCNC: 2.9 GM/DL (ref 2.4–3.5)
GLUCOSE SERPL-MCNC: 100 MG/DL (ref 82–115)
HCT VFR BLD AUTO: 35.9 % (ref 42–52)
HGB BLD-MCNC: 11.6 G/DL (ref 14–18)
IMM GRANULOCYTES # BLD AUTO: 0.02 X10(3)/MCL (ref 0–0.04)
IMM GRANULOCYTES NFR BLD AUTO: 0.3 %
LYMPHOCYTES # BLD AUTO: 1.37 X10(3)/MCL (ref 0.6–4.6)
LYMPHOCYTES NFR BLD AUTO: 17.7 %
MAGNESIUM SERPL-MCNC: 2.1 MG/DL (ref 1.6–2.6)
MCH RBC QN AUTO: 26.5 PG (ref 27–31)
MCHC RBC AUTO-ENTMCNC: 32.3 G/DL (ref 33–36)
MCV RBC AUTO: 82 FL (ref 80–94)
MONOCYTES # BLD AUTO: 0.5 X10(3)/MCL (ref 0.1–1.3)
MONOCYTES NFR BLD AUTO: 6.5 %
NEUTROPHILS # BLD AUTO: 5.64 X10(3)/MCL (ref 2.1–9.2)
NEUTROPHILS NFR BLD AUTO: 72.9 %
NRBC BLD AUTO-RTO: 0 %
PHOSPHATE SERPL-MCNC: 1.7 MG/DL (ref 2.3–4.7)
PLATELET # BLD AUTO: 167 X10(3)/MCL (ref 130–400)
PMV BLD AUTO: 9.9 FL (ref 7.4–10.4)
POTASSIUM SERPL-SCNC: 2.9 MMOL/L (ref 3.5–5.1)
PROT SERPL-MCNC: 5.8 GM/DL (ref 5.8–7.6)
RBC # BLD AUTO: 4.38 X10(6)/MCL (ref 4.7–6.1)
SODIUM SERPL-SCNC: 142 MMOL/L (ref 136–145)
VANCOMYCIN TROUGH SERPL-MCNC: 19.7 UG/ML (ref 15–20)
WBC # BLD AUTO: 7.73 X10(3)/MCL (ref 4.5–11.5)

## 2024-08-21 PROCEDURE — 99900026 HC AIRWAY MAINTENANCE (STAT)

## 2024-08-21 PROCEDURE — 27100171 HC OXYGEN HIGH FLOW UP TO 24 HOURS

## 2024-08-21 PROCEDURE — 94760 N-INVAS EAR/PLS OXIMETRY 1: CPT

## 2024-08-21 PROCEDURE — 25000003 PHARM REV CODE 250: Performed by: INTERNAL MEDICINE

## 2024-08-21 PROCEDURE — 25000003 PHARM REV CODE 250: Performed by: NURSE PRACTITIONER

## 2024-08-21 PROCEDURE — 63600175 PHARM REV CODE 636 W HCPCS: Performed by: NURSE PRACTITIONER

## 2024-08-21 PROCEDURE — 83735 ASSAY OF MAGNESIUM: CPT

## 2024-08-21 PROCEDURE — 99900031 HC PATIENT EDUCATION (STAT)

## 2024-08-21 PROCEDURE — 63600175 PHARM REV CODE 636 W HCPCS: Performed by: PHYSICIAN ASSISTANT

## 2024-08-21 PROCEDURE — 80053 COMPREHEN METABOLIC PANEL: CPT

## 2024-08-21 PROCEDURE — 85025 COMPLETE CBC W/AUTO DIFF WBC: CPT

## 2024-08-21 PROCEDURE — 94003 VENT MGMT INPAT SUBQ DAY: CPT

## 2024-08-21 PROCEDURE — 80202 ASSAY OF VANCOMYCIN: CPT | Performed by: INTERNAL MEDICINE

## 2024-08-21 PROCEDURE — 84100 ASSAY OF PHOSPHORUS: CPT

## 2024-08-21 PROCEDURE — 20000000 HC ICU ROOM

## 2024-08-21 PROCEDURE — 25000003 PHARM REV CODE 250

## 2024-08-21 PROCEDURE — 27200966 HC CLOSED SUCTION SYSTEM

## 2024-08-21 PROCEDURE — 99900035 HC TECH TIME PER 15 MIN (STAT)

## 2024-08-21 PROCEDURE — 36415 COLL VENOUS BLD VENIPUNCTURE: CPT

## 2024-08-21 RX ORDER — METOCLOPRAMIDE HYDROCHLORIDE 5 MG/ML
5 INJECTION INTRAMUSCULAR; INTRAVENOUS 2 TIMES DAILY
Status: DISCONTINUED | OUTPATIENT
Start: 2024-08-21 | End: 2024-08-23 | Stop reason: HOSPADM

## 2024-08-21 RX ADMIN — METOCLOPRAMIDE 5 MG: 5 INJECTION, SOLUTION INTRAMUSCULAR; INTRAVENOUS at 08:08

## 2024-08-21 RX ADMIN — POTASSIUM BICARBONATE 25 MEQ: 977.5 TABLET, EFFERVESCENT ORAL at 12:08

## 2024-08-21 RX ADMIN — VANCOMYCIN HYDROCHLORIDE 1250 MG: 1.25 INJECTION, POWDER, LYOPHILIZED, FOR SOLUTION INTRAVENOUS at 04:08

## 2024-08-21 RX ADMIN — VENLAFAXINE HYDROCHLORIDE 75 MG: 37.5 CAPSULE, EXTENDED RELEASE ORAL at 08:08

## 2024-08-21 RX ADMIN — QUETIAPINE FUMARATE 25 MG: 25 TABLET ORAL at 08:08

## 2024-08-21 RX ADMIN — SUCRALFATE 1 G: 1 TABLET ORAL at 04:08

## 2024-08-21 RX ADMIN — PIPERACILLIN SODIUM AND TAZOBACTAM SODIUM 4.5 G: 4; .5 INJECTION, POWDER, LYOPHILIZED, FOR SOLUTION INTRAVENOUS at 11:08

## 2024-08-21 RX ADMIN — Medication: at 08:08

## 2024-08-21 RX ADMIN — PANTOPRAZOLE SODIUM 40 MG: 40 INJECTION, POWDER, LYOPHILIZED, FOR SOLUTION INTRAVENOUS at 08:08

## 2024-08-21 RX ADMIN — MUPIROCIN: 20 OINTMENT TOPICAL at 08:08

## 2024-08-21 RX ADMIN — DILTIAZEM HYDROCHLORIDE 60 MG: 60 TABLET, FILM COATED ORAL at 11:08

## 2024-08-21 RX ADMIN — FINASTERIDE 5 MG: 5 TABLET, FILM COATED ORAL at 08:08

## 2024-08-21 RX ADMIN — PIPERACILLIN SODIUM AND TAZOBACTAM SODIUM 4.5 G: 4; .5 INJECTION, POWDER, LYOPHILIZED, FOR SOLUTION INTRAVENOUS at 03:08

## 2024-08-21 RX ADMIN — SUCRALFATE 1 G: 1 TABLET ORAL at 05:08

## 2024-08-21 RX ADMIN — MICONAZOLE NITRATE 2 % TOPICAL POWDER: at 08:08

## 2024-08-21 RX ADMIN — DILTIAZEM HYDROCHLORIDE 60 MG: 60 TABLET, FILM COATED ORAL at 05:08

## 2024-08-21 RX ADMIN — CHOLESTYRAMINE 4 G: 4 POWDER, FOR SUSPENSION ORAL at 08:08

## 2024-08-21 RX ADMIN — METOCLOPRAMIDE 5 MG: 5 INJECTION, SOLUTION INTRAMUSCULAR; INTRAVENOUS at 11:08

## 2024-08-21 RX ADMIN — LEVETIRACETAM 1500 MG: 100 SOLUTION ORAL at 08:08

## 2024-08-21 RX ADMIN — METOPROLOL TARTRATE 100 MG: 50 TABLET, FILM COATED ORAL at 08:08

## 2024-08-21 RX ADMIN — SUCRALFATE 1 G: 1 TABLET ORAL at 11:08

## 2024-08-21 RX ADMIN — POTASSIUM PHOSPHATE, MONOBASIC AND POTASSIUM PHOSPHATE, DIBASIC 30 MMOL: 224; 236 INJECTION, SOLUTION, CONCENTRATE INTRAVENOUS at 07:08

## 2024-08-21 RX ADMIN — TAMSULOSIN HYDROCHLORIDE 0.4 MG: 0.4 CAPSULE ORAL at 08:08

## 2024-08-21 RX ADMIN — PIPERACILLIN SODIUM AND TAZOBACTAM SODIUM 4.5 G: 4; .5 INJECTION, POWDER, LYOPHILIZED, FOR SOLUTION INTRAVENOUS at 06:08

## 2024-08-21 RX ADMIN — SUCRALFATE 1 G: 1 TABLET ORAL at 08:08

## 2024-08-21 RX ADMIN — BUSPIRONE HYDROCHLORIDE 5 MG: 5 TABLET ORAL at 08:08

## 2024-08-21 RX ADMIN — BUSPIRONE HYDROCHLORIDE 5 MG: 5 TABLET ORAL at 03:08

## 2024-08-21 NOTE — PLAN OF CARE
After speaking with Anika at [facility], clarified that patient is long term and not SNF. She will have them review. Notified Ginny with [facility] to pause review

## 2024-08-21 NOTE — PROGRESS NOTES
Pharmacokinetic Assessment Follow Up: IV Vancomycin    Vancomycin serum concentration assessment(s):    The trough level was drawn correctly and can be used to guide therapy at this time. The measurement is within the desired definitive target range of 15 to 20 mcg/mL.    Vancomycin Regimen Plan:    Continue regimen to Vancomycin 1250 mg IV every 12 hours with next serum trough concentration measured at 1400 on 08/22    Drug levels (last 3 results):  Recent Labs   Lab Result Units 08/20/24  1556 08/21/24  0251   Vancomycin Trough ug/ml 36.4* 19.7       Pharmacy will continue to follow and monitor vancomycin.    Please contact pharmacy at extension [phone] for questions regarding this assessment.    Thank you for the consult,   Mally Kaye       Patient brief summary:  Delio Daniel Jr. is a 69 y.o. male initiated on antimicrobial therapy with IV Vancomycin for treatment of lower respiratory infection    Drug Allergies:   Review of patient's allergies indicates:  No Known Allergies    Actual Body Weight:   90.7 kg    Renal Function:   Estimated Creatinine Clearance: 106.8 mL/min (based on SCr of 0.75 mg/dL).,     Dialysis Method (if applicable):  N/A    CBC (last 72 hours):  Recent Labs   Lab Result Units 08/19/24  0204 08/19/24  2344 08/20/24  0457 08/21/24  0251   WBC x10(3)/mcL 16.45* 19.53* 15.08* 7.73   Hgb g/dL 14.6 13.0* 12.4* 11.6*   Hct % 45.9 45.3 40.8* 35.9*   Platelet x10(3)/mcL 246 187 182 167   Mono % % 4.2 5.1 5.1 6.5   Eos % % 0.2 0.4 0.2 2.1   Basophil % % 0.4 0.5 0.3 0.5       Metabolic Panel (last 72 hours):  Recent Labs   Lab Result Units 08/19/24  0204 08/19/24  2244 08/19/24  2344 08/20/24  0011 08/20/24  0456 08/20/24  1351 08/21/24  0251   Sodium mmol/L 142  --  141  --  142  --  142   Sodium, Blood Gas mmol/L  --  137  --  136*  --   --   --    Potassium mmol/L 3.7  --  3.8  --  4.1  --  2.9*   Potassium, Blood Gas mmol/L  --  3.6  --  3.2*  --   --   --    Chloride mmol/L 99  --  106  --   103  --  104   CO2 mmol/L 27  --  22*  --  28  --  29   Glucose mg/dL 92  --  137*  --  122*  --  100   Glucose, UA   --   --   --   --   --  Normal  --    Blood Urea Nitrogen mg/dL 19.3  --  11.4  --  10.6  --  8.7   Creatinine mg/dL 0.88  --  0.80  --  0.79  --  0.75   Albumin g/dL 3.6  --  3.0*  --  2.9*  --  2.9*   Bilirubin Total mg/dL 1.2  --  0.9  --  1.1  --  1.4   ALP unit/L 152*  --  124  --  116  --  110   AST unit/L 31  --  23  --  20  --  19   ALT unit/L 26  --  19  --  19  --  17   Magnesium Level mg/dL 2.00  --  2.00  --  1.90  --  2.10   Phosphorus Level mg/dL  --   --  4.5  --  2.6  --  1.7*       Vancomycin Administrations:  vancomycin given in the last 96 hours                     vancomycin 1.25 g in dextrose 5% 250 mL IVPB (ready to mix) (mg) 1,250 mg New Bag 08/20/24 1517     1,250 mg New Bag  0334     1,250 mg New Bag 08/19/24 1804    vancomycin 1.75 g in 5 % dextrose 500 mL IVPB (mg) 1,750 mg New Bag 08/19/24 0351                    Microbiologic Results:  Microbiology Results (last 7 days)       Procedure Component Value Units Date/Time    Blood culture #1 **CANNOT BE ORDERED STAT** [[id number]]  (Normal) Collected: 08/19/24 0231    Order Status: Completed Specimen: Blood Updated: 08/21/24 0300     Blood Culture No Growth At 48 Hours    Blood culture #2 **CANNOT BE ORDERED STAT** [[id number]]  (Normal) Collected: 08/19/24 0231    Order Status: Completed Specimen: Blood Updated: 08/21/24 0300     Blood Culture No Growth At 48 Hours    Respiratory Culture [[id number]] Collected: 08/20/24 0126    Order Status: Completed Specimen: Sputum from Endotracheal Aspirate Updated: 08/20/24 0755     GRAM STAIN Quality 2+      Many Gram Positive Rods      Few Yeast      Few Gram positive cocci           0 = independent

## 2024-08-21 NOTE — PLAN OF CARE
Sent referral to [facility] per  request. Conformed with Anika at [facility] that they do accept patient's while on the vent, however they are unable to accept this patient due to being out of network with the patient's insurance provider.     Referrals sent to [facility] and [facility], awaiting response at this time.

## 2024-08-21 NOTE — PLAN OF CARE
Problem: Adult Inpatient Plan of Care  Goal: Plan of Care Review  Outcome: Progressing  Goal: Patient-Specific Goal (Individualized)  Outcome: Progressing  Goal: Absence of Hospital-Acquired Illness or Injury  Outcome: Progressing  Goal: Optimal Comfort and Wellbeing  Outcome: Progressing  Goal: Readiness for Transition of Care  Outcome: Progressing     Problem: Sepsis/Septic Shock  Goal: Optimal Coping  Outcome: Progressing  Goal: Absence of Bleeding  Outcome: Progressing  Goal: Blood Glucose Level Within Targeted Range  Outcome: Progressing  Goal: Absence of Infection Signs and Symptoms  Outcome: Progressing  Goal: Optimal Nutrition Intake  Outcome: Progressing     Problem: Wound  Goal: Optimal Coping  Outcome: Progressing  Goal: Optimal Functional Ability  Outcome: Progressing  Goal: Absence of Infection Signs and Symptoms  Outcome: Progressing  Goal: Improved Oral Intake  Outcome: Progressing  Goal: Optimal Pain Control and Function  Outcome: Progressing  Goal: Skin Health and Integrity  Outcome: Progressing  Goal: Optimal Wound Healing  Outcome: Progressing     Problem: Skin Injury Risk Increased  Goal: Skin Health and Integrity  Outcome: Progressing     Problem: Infection  Goal: Absence of Infection Signs and Symptoms  Outcome: Progressing     Problem: Fall Injury Risk  Goal: Absence of Fall and Fall-Related Injury  Outcome: Progressing

## 2024-08-21 NOTE — NURSING
Nurses Note -- 4 Eyes      8/21/2024   10:59 AM      Skin assessed during: Q Shift Change      [] No Altered Skin Integrity Present    []Prevention Measures Documented      [x] Yes- Altered Skin Integrity Present or Discovered   [] LDA Added if Not in Epic (Describe Wound)   [] New Altered Skin Integrity was Present on Admit and Documented in LDA   [] Wound Image Taken    Wound Care Consulted? Yes    Attending Nurse:  Allegra SANTIAGO RN    Second RN/Staff Member:   Rico COHEN RN

## 2024-08-21 NOTE — PROGRESS NOTES
"Gastroenterology Progress Note    Subjective/Interval History:  GI called back due to vomiting TFs.     Nurse reports that TFs were started yesterday at 20 cc/hr and increased by 10 cc q4 hours to goal of 80 cc/hr.  Residuals were not checked during the increasing feeds.  Around 4am he vomiting tube feedings and they were held.  He was at goal when it occurred.  Today, TFs remain held; meds and water were administered through the PEG this AM.     ROS:  Review of Systems   Unable to perform ROS: Patient nonverbal       Vital Signs:  /85   Pulse 98   Temp 99.6 °F (37.6 °C) (Oral)   Resp 20   Ht 5' 10" (1.778 m)   Wt 90.7 kg (200 lb)   SpO2 100%   BMI 28.70 kg/m²   Body mass index is 28.7 kg/m².    Physical Exam:  Physical Exam  Constitutional:       General: He is not in acute distress.     Appearance: He is ill-appearing (chronically).   HENT:      Head: Normocephalic and atraumatic.      Comments: Crani scar  Eyes:      General: No scleral icterus.  Cardiovascular:      Rate and Rhythm: Normal rate and regular rhythm.   Pulmonary:      Effort: Pulmonary effort is normal. No respiratory distress.      Comments: Trach on mechanical ventilation.  Abdominal:      General: Bowel sounds are normal. There is no distension.      Palpations: Abdomen is soft. There is no mass.      Tenderness: There is no abdominal tenderness. There is no guarding or rebound.      Comments: PEG in place in epigastrium.  External bumper not flush with skin.  Markings no longer able to be seen. TFs on hold.  No residuals at this time.   Musculoskeletal:      Right lower leg: No edema.      Left lower leg: No edema.      Comments: Contracted LUE   Skin:     General: Skin is warm and dry.      Coloration: Skin is not jaundiced or pale.   Neurological:      Comments: Does not respond or follow commands.    Psychiatric:      Comments: Unable to assess     Labs:  Recent Results (from the past 48 hour(s))   POCT glucose    Collection " Time: 08/19/24 11:22 AM   Result Value Ref Range    POCT Glucose 127 (H) 70 - 110 mg/dL   RT Blood Gas    Collection Time: 08/19/24 10:44 PM   Result Value Ref Range    Sample Type Arterial Blood     Sample site Right Radial Artery     Drawn by pearl rt     pH, Blood gas 7.090 (LL) 7.350 - 7.450    pCO2, Blood gas >115.0 (HH) 35.0 - 45.0 mmHg    pO2, Blood gas 140.0 (H) 80.0 - 100.0 mmHg    Sodium, Blood Gas 137 137 - 145 mmol/L    Potassium, Blood Gas 3.6 3.5 - 5.0 mmol/L    Calcium Level Ionized 1.16 1.12 - 1.23 mmol/L    TOC2, Blood gas 43.7 mmol/L    Base Excess, Blood gas 5.40 (H) -2.00 - 2.00 mmol/L    sO2, Blood gas 98.2 %    HCO3, Blood gas 39.7 (H) 22.0 - 26.0 mmol/L    THb, Blood gas 13.2 12 - 16 g/dL    O2 Hb, Blood Gas 96.6 94.0 - 97.0 %    CO Hgb 2.3 (H) 0.5 - 1.5 %    Met Hgb 0.9 0.4 - 1.5 %    Allens Test Yes     Oxygen Device, Blood gas Aerosol Ronald     LPM 4    Comprehensive Metabolic Panel    Collection Time: 08/19/24 11:44 PM   Result Value Ref Range    Sodium 141 136 - 145 mmol/L    Potassium 3.8 3.5 - 5.1 mmol/L    Chloride 106 98 - 107 mmol/L    CO2 22 (L) 23 - 31 mmol/L    Glucose 137 (H) 82 - 115 mg/dL    Blood Urea Nitrogen 11.4 8.4 - 25.7 mg/dL    Creatinine 0.80 0.73 - 1.18 mg/dL    Calcium 8.5 (L) 8.8 - 10.0 mg/dL    Protein Total 6.8 5.8 - 7.6 gm/dL    Albumin 3.0 (L) 3.4 - 4.8 g/dL    Globulin 3.8 (H) 2.4 - 3.5 gm/dL    Albumin/Globulin Ratio 0.8 (L) 1.1 - 2.0 ratio    Bilirubin Total 0.9 <=1.5 mg/dL     40 - 150 unit/L    ALT 19 0 - 55 unit/L    AST 23 5 - 34 unit/L    eGFR >60 mL/min/1.73/m2    Anion Gap 13.0 mEq/L    BUN/Creatinine Ratio 14    Magnesium    Collection Time: 08/19/24 11:44 PM   Result Value Ref Range    Magnesium Level 2.00 1.60 - 2.60 mg/dL   Phosphorus    Collection Time: 08/19/24 11:44 PM   Result Value Ref Range    Phosphorus Level 4.5 2.3 - 4.7 mg/dL   Lactic Acid, Plasma    Collection Time: 08/19/24 11:44 PM   Result Value Ref Range    Lactic Acid Level 2.4  (H) 0.5 - 2.2 mmol/L   CBC with Differential    Collection Time: 08/19/24 11:44 PM   Result Value Ref Range    WBC 19.53 (H) 4.50 - 11.50 x10(3)/mcL    RBC 4.89 4.70 - 6.10 x10(6)/mcL    Hgb 13.0 (L) 14.0 - 18.0 g/dL    Hct 45.3 42.0 - 52.0 %    MCV 92.6 80.0 - 94.0 fL    MCH 26.6 (L) 27.0 - 31.0 pg    MCHC 28.7 (L) 33.0 - 36.0 g/dL    RDW 16.1 11.5 - 17.0 %    Platelet 187 130 - 400 x10(3)/mcL    MPV 10.2 7.4 - 10.4 fL    Neut % 86.9 %    Lymph % 6.7 %    Mono % 5.1 %    Eos % 0.4 %    Basophil % 0.5 %    Lymph # 1.30 0.6 - 4.6 x10(3)/mcL    Neut # 16.99 (H) 2.1 - 9.2 x10(3)/mcL    Mono # 0.99 0.1 - 1.3 x10(3)/mcL    Eos # 0.08 0 - 0.9 x10(3)/mcL    Baso # 0.09 <=0.2 x10(3)/mcL    IG# 0.08 (H) 0 - 0.04 x10(3)/mcL    IG% 0.4 %    NRBC% 0.0 %   Blood Smear Microscopic Exam    Collection Time: 08/19/24 11:44 PM   Result Value Ref Range    RBC Morph Abnormal (A) Normal    Ovalocytes 1+ (A) (none)    Poikilocytosis 1+ (A) (none)    Platelets Normal Normal, Adequate   RT Blood Gas    Collection Time: 08/20/24 12:11 AM   Result Value Ref Range    Sample Type Arterial Blood     Sample site Right Radial Artery     Drawn by estefania rt     pH, Blood gas 7.500 (H) 7.350 - 7.450    pCO2, Blood gas 44.0 35.0 - 45.0 mmHg    pO2, Blood gas 435.0 (H) 80.0 - 100.0 mmHg    Sodium, Blood Gas 136 (L) 137 - 145 mmol/L    Potassium, Blood Gas 3.2 (L) 3.5 - 5.0 mmol/L    Calcium Level Ionized 0.99 (L) 1.12 - 1.23 mmol/L    TOC2, Blood gas 35.7 mmol/L    Base Excess, Blood gas 10.00 (H) -2.00 - 2.00 mmol/L    sO2, Blood gas 100.0 %    HCO3, Blood gas 34.3 (H) 22.0 - 26.0 mmol/L    THb, Blood gas 11.5 (L) 12 - 16 g/dL    O2 Hb, Blood Gas 97.3 (H) 94.0 - 97.0 %    CO Hgb 1.3 0.5 - 1.5 %    Met Hgb 1.1 0.4 - 1.5 %    Allens Test Yes     MODE AC     Oxygen Device, Blood gas Ventilator     FIO2, Blood gas 100.0 %    Mech Vt 500 ml    Mech RR 20 b/min    PEEP 5.0 cmH2O   Respiratory Culture    Collection Time: 08/20/24  1:26 AM    Specimen:  Endotracheal Aspirate; Sputum   Result Value Ref Range    Respiratory Culture Normal respiratory niyah     GRAM STAIN Quality 2+     GRAM STAIN Many Gram Positive Rods     GRAM STAIN Few Yeast     GRAM STAIN Few Gram positive cocci    Comprehensive Metabolic Panel (CMP)    Collection Time: 08/20/24  4:56 AM   Result Value Ref Range    Sodium 142 136 - 145 mmol/L    Potassium 4.1 3.5 - 5.1 mmol/L    Chloride 103 98 - 107 mmol/L    CO2 28 23 - 31 mmol/L    Glucose 122 (H) 82 - 115 mg/dL    Blood Urea Nitrogen 10.6 8.4 - 25.7 mg/dL    Creatinine 0.79 0.73 - 1.18 mg/dL    Calcium 9.0 8.8 - 10.0 mg/dL    Protein Total 6.3 5.8 - 7.6 gm/dL    Albumin 2.9 (L) 3.4 - 4.8 g/dL    Globulin 3.4 2.4 - 3.5 gm/dL    Albumin/Globulin Ratio 0.9 (L) 1.1 - 2.0 ratio    Bilirubin Total 1.1 <=1.5 mg/dL     40 - 150 unit/L    ALT 19 0 - 55 unit/L    AST 20 5 - 34 unit/L    eGFR >60 mL/min/1.73/m2    Anion Gap 11.0 mEq/L    BUN/Creatinine Ratio 13    Magnesium    Collection Time: 08/20/24  4:56 AM   Result Value Ref Range    Magnesium Level 1.90 1.60 - 2.60 mg/dL   Phosphorus    Collection Time: 08/20/24  4:56 AM   Result Value Ref Range    Phosphorus Level 2.6 2.3 - 4.7 mg/dL   Lactic Acid, Plasma    Collection Time: 08/20/24  4:56 AM   Result Value Ref Range    Lactic Acid Level 3.1 (H) 0.5 - 2.2 mmol/L   CBC with Differential    Collection Time: 08/20/24  4:57 AM   Result Value Ref Range    WBC 15.08 (H) 4.50 - 11.50 x10(3)/mcL    RBC 4.72 4.70 - 6.10 x10(6)/mcL    Hgb 12.4 (L) 14.0 - 18.0 g/dL    Hct 40.8 (L) 42.0 - 52.0 %    MCV 86.4 80.0 - 94.0 fL    MCH 26.3 (L) 27.0 - 31.0 pg    MCHC 30.4 (L) 33.0 - 36.0 g/dL    RDW 15.8 11.5 - 17.0 %    Platelet 182 130 - 400 x10(3)/mcL    MPV 10.0 7.4 - 10.4 fL    Neut % 84.7 %    Lymph % 9.2 %    Mono % 5.1 %    Eos % 0.2 %    Basophil % 0.3 %    Lymph # 1.39 0.6 - 4.6 x10(3)/mcL    Neut # 12.76 (H) 2.1 - 9.2 x10(3)/mcL    Mono # 0.77 0.1 - 1.3 x10(3)/mcL    Eos # 0.03 0 - 0.9 x10(3)/mcL     Baso # 0.05 <=0.2 x10(3)/mcL    IG# 0.08 (H) 0 - 0.04 x10(3)/mcL    IG% 0.5 %    NRBC% 0.0 %   Urinalysis, Reflex to Urine Culture    Collection Time: 08/20/24  1:51 PM    Specimen: Urine, Catheterized   Result Value Ref Range    Color, UA Yellow Yellow, Light-Yellow, Colorless, Straw, Dark-Yellow    Appearance, UA Clear Clear    Specific Gravity, UA 1.031 (H) 1.005 - 1.030    pH, UA 8.0 5.0 - 8.5    Protein, UA 1+ (A) Negative    Glucose, UA Normal Negative, Normal    Ketones, UA Negative Negative    Blood, UA 2+ (A) Negative    Bilirubin, UA Negative Negative    Urobilinogen, UA Normal 0.2, 1.0, Normal    Nitrites, UA Negative Negative    Leukocyte Esterase, UA Negative Negative    RBC, UA >100 (A) None Seen, 0-2, 3-5, 0-5 /HPF    WBC, UA 6-10 (A) None Seen, 0-2, 3-5, 0-5 /HPF    Bacteria, UA None Seen None Seen, Trace /HPF    Squamous Epithelial Cells, UA None Seen None Seen /HPF    Mucous, UA Trace (A) None Seen /LPF    Amorphous Crystal, UA Trace (A) None Seen /uL   VANCOMYCIN, TROUGH    Collection Time: 08/20/24  3:56 PM   Result Value Ref Range    Vancomycin Trough 36.4 (HH) 15.0 - 20.0 ug/ml   VANCOMYCIN, TROUGH    Collection Time: 08/21/24  2:51 AM   Result Value Ref Range    Vancomycin Trough 19.7 15.0 - 20.0 ug/ml   Comprehensive Metabolic Panel    Collection Time: 08/21/24  2:51 AM   Result Value Ref Range    Sodium 142 136 - 145 mmol/L    Potassium 2.9 (L) 3.5 - 5.1 mmol/L    Chloride 104 98 - 107 mmol/L    CO2 29 23 - 31 mmol/L    Glucose 100 82 - 115 mg/dL    Blood Urea Nitrogen 8.7 8.4 - 25.7 mg/dL    Creatinine 0.75 0.73 - 1.18 mg/dL    Calcium 8.4 (L) 8.8 - 10.0 mg/dL    Protein Total 5.8 5.8 - 7.6 gm/dL    Albumin 2.9 (L) 3.4 - 4.8 g/dL    Globulin 2.9 2.4 - 3.5 gm/dL    Albumin/Globulin Ratio 1.0 (L) 1.1 - 2.0 ratio    Bilirubin Total 1.4 <=1.5 mg/dL     40 - 150 unit/L    ALT 17 0 - 55 unit/L    AST 19 5 - 34 unit/L    eGFR >60 mL/min/1.73/m2    Anion Gap 9.0 mEq/L    BUN/Creatinine  Ratio 12    Magnesium    Collection Time: 08/21/24  2:51 AM   Result Value Ref Range    Magnesium Level 2.10 1.60 - 2.60 mg/dL   Phosphorus    Collection Time: 08/21/24  2:51 AM   Result Value Ref Range    Phosphorus Level 1.7 (L) 2.3 - 4.7 mg/dL   CBC with Differential    Collection Time: 08/21/24  2:51 AM   Result Value Ref Range    WBC 7.73 4.50 - 11.50 x10(3)/mcL    RBC 4.38 (L) 4.70 - 6.10 x10(6)/mcL    Hgb 11.6 (L) 14.0 - 18.0 g/dL    Hct 35.9 (L) 42.0 - 52.0 %    MCV 82.0 80.0 - 94.0 fL    MCH 26.5 (L) 27.0 - 31.0 pg    MCHC 32.3 (L) 33.0 - 36.0 g/dL    RDW 15.9 11.5 - 17.0 %    Platelet 167 130 - 400 x10(3)/mcL    MPV 9.9 7.4 - 10.4 fL    Neut % 72.9 %    Lymph % 17.7 %    Mono % 6.5 %    Eos % 2.1 %    Basophil % 0.5 %    Lymph # 1.37 0.6 - 4.6 x10(3)/mcL    Neut # 5.64 2.1 - 9.2 x10(3)/mcL    Mono # 0.50 0.1 - 1.3 x10(3)/mcL    Eos # 0.16 0 - 0.9 x10(3)/mcL    Baso # 0.04 <=0.2 x10(3)/mcL    IG# 0.02 0 - 0.04 x10(3)/mcL    IG% 0.3 %    NRBC% 0.0 %       Imaging:  X-Ray Chest 1 View    Result Date: 8/21/2024  EXAMINATION: XR CHEST 1 VIEW CPT 57796 CLINICAL HISTORY: concern for aspiration; COMPARISON: August 19, 2024 FINDINGS: Examination reveals cardiomediastinal silhouette and pleuroparenchymal changes to be essentially unchanged as compared with the previous exam     No significant change as compared with the previous exam Electronically signed by: Vik Keen Date:    08/21/2024 Time:    07:58    X-Ray Chest 1 View    Result Date: 8/20/2024  EXAMINATION: XR CHEST 1 VIEW CPT 74831 CLINICAL HISTORY: pna; COMPARISON: August 19, 2024 FINDINGS: Examination reveals cardiomediastinal silhouette and pleuroparenchymal changes to be essentially unchanged as compared with the previous exam. Slightly more confluent opacities in the left retrocardiac region early infiltrate cannot be excluded. Support catheters remain in place     Slightly more confluent opacities in the left retrocardiac region infiltrate can  not be completely excluded. No other significant change Electronically signed by: Vik Keen Date:    08/20/2024 Time:    08:47    X-Ray Chest 1 View    Result Date: 8/19/2024  EXAMINATION XR CHEST 1 VIEW CLINICAL HISTORY Cough, unspecified TECHNIQUE A total of 1 frontal image(s) of the chest. COMPARISON 6 August 2024 FINDINGS Lines/tubes/devices: Grossly unchanged positioning when allowing for differences in technique and patient rotation. The cardiac silhouette and central vascular structures are unchanged.  The trachea is midline. No new or worsening consolidation is identified. There is no enlarging pleural effusion or convincing pneumothorax. Regional osseous structures and extrathoracic soft tissues are similar. IMPRESSION No significant interval change. Electronically signed by: Isaac Carcamo Date:    08/19/2024 Time:    07:16    CT Chest Abdomen Pelvis With IV Contrast (XPD)    Result Date: 8/19/2024  EXAMINATION CT CHEST ABDOMEN PELVIS WITH IV CONTRAST (XPD) CLINICAL HISTORY Sepsis; TECHNIQUE Post-contrast helical-acquisition CT images were obtained and multiplanar reformats accomplished by a CT technologist at a separate workstation and pushed to PACS for physician review. CONTRAST *IV: Omnipaque 350, 100 mL *Enteric: none COMPARISON *18 July 2024 chest CT *4 August 2024 abdominopelvic CT FINDINGS Images were reviewed in soft tissue, lung, and bone windows. Exam quality: adequate for evaluation Lines/tubes: Unchanged tracheostomy and right chest wall cardiac device.  Left upper quadrant PEG tube and Fernandez catheter remain in place. Similar dilated appearance of the heart chambers.  Mediastinal vasculature is unchanged.  No development of pericardial effusion.  Bilateral lung infiltrates developed in the interval.  No pleural effusion or pneumothorax. Gallbladder and bile ducts are similar.  No significant change of the upper abdominal solid organs in the relatively short interval.  There is no  radiodense urolithiasis or findings of distal obstructive uropathy.  Urinary bladder is nondistended, precluding adequate evaluation.  Prostate gland is unchanged. Esophagus is unremarkable.  Stomach nondistended.  No findings of high-grade mechanical bowel obstruction or other acute gastrointestinal process.  Appendix is normal.  Small bowel anastomosis at the right lower quadrant unchanged in comparison. There is no free intra-abdominal air or fluid.  No drainable collections.  Aortoiliac tapering through the abdomen and pelvis is unchanged in comparison.  Similar mild burden of scattered atherosclerotic calcification.  No development of pathologic lymph node enlargement. Advanced chronic alterations with heterotopic ossification at the left hip unchanged in the interval.  Remaining visualized osseous structures are stable in comparison.  No new or worsening subcutaneous or muscular abnormality. IMPRESSION 1. New bilateral lung infiltrates may represent developing infectious process. 2. Otherwise, unchanged CT appearance of the visualized structures.  Chronic secondary findings discussed above. RADIATION DOSE Automated tube current modulation, weight-based exposure dosing, and/or iterative reconstruction technique utilized to reach lowest reasonably achievable exposure rate. DLP: 1534 mGy*cm Electronically signed by: Isaac Carcamo Date:    08/19/2024 Time:    07:15    X-Ray Chest 1 View    Result Date: 8/6/2024  EXAMINATION: XR CHEST 1 VIEW CPT 63648 CLINICAL HISTORY: Sob; COMPARISON: August 3, 2024 FINDINGS: Examination reveals cardiomediastinal silhouette and pleuroparenchymal changes are essentially unchanged as compared with the previous exam     No significant change Electronically signed by: Vik Keen Date:    08/06/2024 Time:    14:06    CT Abdomen Pelvis With IV Contrast NO Oral Contrast    Result Date: 8/4/2024  EXAMINATION: CT ABDOMEN PELVIS WITH IV CONTRAST CLINICAL HISTORY: Abdominal pain,  acute, nonlocalized;Nausea/vomiting; TECHNIQUE: Helical acquisition through the abdomen and pelvis with IV contrast.  Three plane reconstructions were provided for review. DLP 1610 mGycm. Automatic exposure control, adjustment of mA/kV or iterative reconstruction technique was used to reduce radiation. COMPARISON: 18 July 2024 FINDINGS: Motion degraded scan. The limited imaged lung bases are clear. No acute findings liver, gallbladder, spleen, pancreas or adrenals.  No hydronephrosis. There is prominent distal esophageal wall thickening.  A gastrostomy tube is in place.  No significant inflammatory changes of the small or large bowel.  Normal appendix.  No free air. There is a Fernandez in the urinary bladder.  No pelvic free fluid.  Abdominal aorta normal in caliber.  Mild atherosclerotic disease. There are no acute osseous findings.  Prominent heterotopic bone around the left hip.  Is     1. Prominent distal esophageal wall thickening suggestive of esophagitis. 2. Otherwise no acute abdominopelvic findings.  Chronic findings above. 3. No significant discrepancy with the preliminary report. Electronically signed by: Tani Calderon Date:    08/04/2024 Time:    09:25    X-Ray Chest AP Portable    Result Date: 8/3/2024  EXAMINATION: XR CHEST AP PORTABLE CLINICAL HISTORY: Vomiting, unspecified TECHNIQUE: One view COMPARISON: July 21, 2024. FINDINGS: Cardiopericardial silhouette appearance is similar.  Cardiac device electrodes are in similar location.  Left upper chest is obscured by the mandible.  No overt edema or consolidation.  There are right infrahilar lower lung lobe atelectatic changes.  Possible small left pleural effusion.     Right infrahilar lower lung zone atelectasis and possible small left pleural effusion. Electronically signed by: Sami Taylor Date:    08/03/2024 Time:    22:09    CT Head Without Contrast    Result Date: 7/23/2024  EXAMINATION: CT HEAD WITHOUT CONTRAST CLINICAL HISTORY: Implant planning;  TECHNIQUE: CT imaging of the head and neck performed according to a planning protocol.  DLP 1255 mGycm. Automatic exposure control, adjustment of mA/kV or iterative reconstruction technique was used to reduce radiation. COMPARISON: 18 July 2024 FINDINGS: Perhaps slightly increased herniation of the brain through the craniectomy defect.  No new parenchymal attenuation abnormality.  No acute hemorrhage seen.  Little if any midline shift.  Similar ventricular enlargement.  There are vascular calcifications.     CT for surgical planning.  Perhaps slightly increased herniation of the brain through the craniectomy defect. Electronically signed by: Tani Calderon Date:    07/23/2024 Time:    17:16         Assessment/Plan:  68 y.o. male known to Dr. Escalona from inpatient care (primary GI is Dr. 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  Presented from nursing home for concerns of dark vomiting from mouth and trach.  Admitted with pneumonia, sepsis, and acute respiratory failure requiring mechanical ventilation.  GI initially consulted due to concern for coffee ground emesis.  Then now called back due to vomiting TFs.      TF intolerance / vomiting  - Keep HOB elevated at all times.   - Start low dose reglan 5 mg BID.  - Resume TFs at 20 cc/hr and increase by 10 cc q4 hours.  Monitor TF residuals q4 hours and hold if >150.    Coffee ground emesis - resolved  Hgb 13 -- 12.4 -- 11.6   CT reports normal esophagus and stomach.   - Continue ppi  - No plans for endoscopy at this time.        Darlyn Singh PA-C

## 2024-08-21 NOTE — PROGRESS NOTES
Inpatient Nutrition Assessment    Admit Date: 8/19/2024   Total duration of encounter: 2 days   Patient Age: 69 y.o.    Nutrition Recommendation/Prescription     Tube feeding as tolerated:  Impact Peptide 1.5 goal rate 75 ml/hr to provide (calculations based on estimated 20 hr/d run time)   2250 kcal, 109% needs  141 g protein, 100% needs  1155 ml free water, will require additional fluid source, can be given as 45 ml/hr water flush once tube feeding tolerated at goal    Communication of Recommendations: reviewed with nurse    Nutrition Assessment     Malnutrition Assessment/Nutrition-Focused Physical Exam    Malnutrition Context: chronic illness (08/19/24 1221)  Malnutrition Level: other (see comments) (does not meet criteria) (08/19/24 1221)  Energy Intake (Malnutrition): other (see comments) (unable to evaluate) (08/19/24 1221)  Weight Loss (Malnutrition): other (see comments) (unable to evaluate) (08/19/24 1221)  Subcutaneous Fat (Malnutrition): other (see comments) (does not meet criteria) (08/19/24 1221)           Muscle Mass (Malnutrition): other (see comments) (does not meet criteria) (08/19/24 1221)                          Fluid Accumulation (Malnutrition): other (see comments) (does not meet criteria) (08/19/24 1221)  Hand  Strength, Left (Malnutrition): unable to evaluate (08/19/24 1221)  Hand  Strength, Right (Malnutrition): unable to evaluate (08/19/24 1221)  A minimum of two characteristics is recommended for diagnosis of either severe or non-severe malnutrition.    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     Scheduled Medications:  busPIRone, 5 mg, TID  cholestyramine, 1 packet, Daily  diltiaZEM, 60 mg, Q6H  finasteride, 5 mg, Daily  levetiracetam, 1,500 mg, BID  metoclopramide, 5 mg, BID  metoprolol tartrate, 100 mg, BID  miconazole NITRATE 2 %, , BID  mupirocin, , BID  pantoprazole, 40 mg, BID  piperacillin-tazobactam (Zosyn) IV (PEDS and ADULTS) (extended infusion is not appropriate), 4.5 g, Q8H  potassium phosphate IVPB, 30 mmol, Once  QUEtiapine, 25 mg, BID  scopolamine, 1 patch, Q3 Days  sucralfate, 1 g, QID (AC & HS)  tamsulosin, 0.4 mg, QHS  vancomycin (VANCOCIN) IV (PEDS and ADULTS), 1,250 mg, Q12H  venlafaxine, 75 mg, Daily  zinc oxide-cod liver oil, , BID    Continuous Infusions:  NORepinephrine bitartrate-D5W, Last Rate: Stopped (08/19/24 4896)    PRN Medications:  acetaminophen, 650 mg, Q6H PRN  aluminum-magnesium hydroxide-simethicone, 30 mL, QID PRN  melatonin, 6 mg, Nightly PRN  naloxone, 0.02 mg, PRN  ondansetron, 4 mg, Q4H PRN  polyethylene glycol, 17 g, BID PRN  prochlorperazine, 5 mg, Q6H PRN  simethicone, 1 tablet, QID PRN  vancomycin - pharmacy to dose, , pharmacy to manage frequency    Calorie Containing IV Medications: no significant kcals from medications at this time    Recent Labs   Lab 08/19/24  0204 08/19/24  2344 08/20/24  0456 08/20/24  0457 08/21/24  0251    141 142  --  142   K 3.7 3.8 4.1  --  2.9*   CALCIUM 9.3 8.5* 9.0  --  8.4*   PHOS  --  4.5 2.6  --  1.7*   MG 2.00 2.00 1.90  --  2.10   CO2 27 22* 28  --  29   BUN 19.3 11.4 10.6  --  8.7   CREATININE 0.88 0.80 0.79  --  0.75   EGFRNORACEVR >60 >60 >60  --  >60   GLUCOSE 92 137* 122*  --  100   BILITOT 1.2 0.9 1.1  --  1.4   ALKPHOS 152* 124 116  --  110   ALT 26 19 19  --  17   AST 31 23 20  --  19   ALBUMIN 3.6 3.0* 2.9*  --  2.9*   WBC 16.45* 19.53*  --  15.08* 7.73   HGB 14.6 13.0*  --  12.4* 11.6*   HCT 45.9 45.3  --  40.8* 35.9*     Nutrition Orders:  Diet NPO  Tube Feedings/Formulas 125; Impact  "Peptide 1.5; Gastrostomy; 200; Every 4 hours    Appetite/Oral Intake: NPO/not applicable  Factors Affecting Nutritional Intake: NPO  Social Needs Impacting Access to Food: unable to assess at this time; will attempt on follow-up  Food/[employer]ist/Cultural Preferences: unable to obtain  Food Allergies: no known food allergies  Last Bowel Movement: 08/21/24  Wound(s):     Wound 08/05/24 1420 Pressure Injury Left Knee-Tissue loss description: Partial thickness    Comments    8/19/24: Consult for TF recs; noted per previous admission, pt normally is on Impact Peptide 1.5 @ 80mL/hr; can order Pivot 1.5 (substitute for Impact 1.5). Unable to verify usual wt with pt; weights fluctuate too much in the last several months.    8/21/24  Nurse reports patient transferred to ICU after vomiting/aspiration, now on ventilator per tracheostomy. Nurse reports tube feeding advanced to goal rate (80 ml/hr) yesterday and had large emesis overnight, tube feeding currently held with plans to start motility agent and resume at low rate. Current order is for 200 ml water flush every 4 hours, may do better with smaller hourly water flushes.     Anthropometrics    Height: 5' 10" (177.8 cm), Height Method: Estimated  Last Weight: 90.7 kg (200 lb) (08/19/24 0106), Weight Method: Estimated  BMI (Calculated): 28.7  BMI Classification: overweight (BMI 25-29.9)        Ideal Body Weight (IBW), Male: 166 lb     % Ideal Body Weight, Male (lb): 120.48 %                          Usual Weight Provided By: EMR weight history    Wt Readings from Last 5 Encounters:   08/19/24 90.7 kg (200 lb)   08/03/24 117.9 kg (260 lb)   07/18/24 117.9 kg (260 lb)   06/25/24 90.7 kg (200 lb)   05/15/24 90.7 kg (200 lb)     Weight Change(s) Since Admission:   Wt Readings from Last 1 Encounters:   08/19/24 0106 90.7 kg (200 lb)   Admit Weight: 90.7 kg (200 lb) (08/19/24 0106), Weight Method: Estimated    Estimated Needs    Weight Used For Calorie Calculations: 90.7 kg (199 lb " 15.3 oz)  Energy Calorie Requirements (kcal): 2,053.88  Energy Need Method: [place]  Total Ve: 11.9 L/m, Temp (24hrs), Av.4 °F (37.4 °C), Min:99.1 °F (37.3 °C), Max:99.7 °F (37.6 °C)  Vtot (L/Min) for [place] Equation Calculation: 11.9; Max Temp for [employer] Equation Calculation: 99.7 °F  Weight Used For Protein Calculations: 90.7 kg (199 lb 15.3 oz)  Protein Requirements: 136-155 g, 1.5-1.7 g/kg  Fluid Requirements (mL): 2,053.88, 1 ml/kcal     Last Updated:      Enteral Nutrition     Patient not receiving enteral nutrition at this time.    Parenteral Nutrition     Patient not receiving parenteral nutrition support at this time.    Evaluation of Received Nutrient Intake    Calories: not meeting estimated needs  Protein: not meeting estimated needs    Patient Education     Not applicable.    Nutrition Diagnosis     PES: Inadequate energy intake related to acute illness as evidenced by NPO with no nutrition support since admit. (active)     Nutrition Interventions     Intervention(s): modified rate of enteral nutrition and collaboration with other providers    Goal: Meet greater than 80% of nutritional needs by follow-up. (goal not met)  Goal: Maintain weight throughout hospitalization. (goal progressing)    Nutrition Goals & Monitoring     Dietitian will monitor: energy intake, enteral nutrition intake, and weight  Discharge planning: tube feeding (current)  Nutrition Risk/Follow-Up: high (follow-up in 1-4 days)   Please consult if re-assessment needed sooner.

## 2024-08-21 NOTE — NURSING
Nurses Note -- 4 Eyes      8/21/2024   6:36 AM      Skin assessed during: Daily Assessment      [] No Altered Skin Integrity Present    [x]Prevention Measures Documented      [x] Yes- Altered Skin Integrity Present or Discovered   [] LDA Added if Not in Epic (Describe Wound)   [] New Altered Skin Integrity was Present on Admit and Documented in LDA   [] Wound Image Taken    Wound Care Consulted? Yes    Attending Nurse:  Marianna Johnson RN/Staff Member:  COLIN Gray

## 2024-08-21 NOTE — PLAN OF CARE
Notified by nurse that patient will need ventilator NH placement. Called patient's daughter, Tony and informed her. Notified Odal[facility] to send referral to [facility] and notify that this is vent patient.

## 2024-08-21 NOTE — PLAN OF CARE
Spoke to Tony delacruz regarding [facility] denying patient. Told her that next closest place is in [place] and that [facility] has received referral and is reviewing. [facility] explained [facility] then we will have to send referral farther in distance.

## 2024-08-21 NOTE — PROGRESS NOTES
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 Pt resides in nursing home. Presented to ED from NH 8/19/24 due to concern for coffee ground emesis from mouth/trach. On initial presentation, he was found to be febrile to 101.4F, tachycardic, hypotensive, saturating 98% on 4L trach collar. labs with leukocytosis of 16,000, H/H 14.6/45.9, INR 1.4, .1, troponin 0.035, and lactic acid 2.6. EKG revealed atrial fibrillation with RVR and heart rate of 125. CT chest/abdomen/pelvis with IV contrast demonstrated bilateral lung infiltrates, which may represent developing infectious process, normal esophagus and stomach. In ED, emesis tested + for occult blood. Given Vanc, Zosyn, IV Protonix, IV Digoxin 250mcg. Admitted to Hospital Medicine. GI consulted; given Hgb normal, no plans for endoscopy. Recommended continued PPI and slow tube feeds.     While on floor, nursing staff noted change in mentation with patient becoming more obtunded. Rapid response called. ABG revealed pH 7.09, pCO2 >115. Pt placed on mechanical ventilation and upgraded to ICU on 8/19      Interval History:  -no major issues or changes overnight   -he did have some vomiting with tube feeds reach goal, GI was consulted for hematemesis originally they added Reglan  -urine output of 1.7 L over pessary per hours   -hemoglobin 11.6, potassium 2.9, phosphorus 1.7  -patient was lying in bed somnolent not perform any purposeful activities which is his baseline      Scheduled Medications:    busPIRone  5 mg Per G Tube TID    cholestyramine  1 packet Per G Tube Daily    diltiaZEM  60 mg Per G Tube Q6H    finasteride  5 mg Oral Daily     levetiracetam  1,500 mg Per G Tube BID    metoclopramide  5 mg Intravenous BID    metoprolol tartrate  100 mg Per G Tube BID    miconazole NITRATE 2 %   Topical (Top) BID    mupirocin   Nasal BID    pantoprazole  40 mg Intravenous BID    piperacillin-tazobactam (Zosyn) IV (PEDS and ADULTS) (extended infusion is not appropriate)  4.5 g Intravenous Q8H    potassium phosphate IVPB  30 mmol Intravenous Once    QUEtiapine  25 mg Per G Tube BID    scopolamine  1 patch Transdermal Q3 Days    sucralfate  1 g Per G Tube QID (AC & HS)    tamsulosin  0.4 mg Oral QHS    vancomycin (VANCOCIN) IV (PEDS and ADULTS)  1,250 mg Intravenous Q12H    venlafaxine  75 mg Oral Daily    zinc oxide-cod liver oil   Topical (Top) BID       PRN Medications:     Current Facility-Administered Medications:     acetaminophen, 650 mg, Oral, Q6H PRN    aluminum-magnesium hydroxide-simethicone, 30 mL, Oral, QID PRN    melatonin, 6 mg, Oral, Nightly PRN    naloxone, 0.02 mg, Intravenous, PRN    ondansetron, 4 mg, Intravenous, Q4H PRN    polyethylene glycol, 17 g, Oral, BID PRN    prochlorperazine, 5 mg, Intravenous, Q6H PRN    simethicone, 1 tablet, Oral, QID PRN    vancomycin - pharmacy to dose, , Intravenous, pharmacy to manage frequency      Infusions:     NORepinephrine bitartrate-D5W  0-3 mcg/kg/min Intravenous Continuous   Stopped at 08/19/24 2358         Fluid Balance:     Intake/Output Summary (Last 24 hours) at 8/21/2024 1031  Last data filed at 8/21/2024 0641  Gross per 24 hour   Intake 1339.06 ml   Output 1600 ml   Net -260.94 ml         Vital Signs:   Vitals:    08/21/24 0945   BP:    Pulse: 61   Resp: 20   Temp:          Physical Exam  Vitals and nursing note reviewed.   Constitutional:       Appearance: He is ill-appearing.   HENT:      Head: Normocephalic.      Right Ear: External ear normal.      Left Ear: External ear normal.      Nose: Nose normal.      Mouth/Throat:      Mouth: Mucous membranes are moist.      Pharynx: Oropharynx is  clear. No oropharyngeal exudate or posterior oropharyngeal erythema.   Eyes:      General: No scleral icterus.     Extraocular Movements: Extraocular movements intact.      Conjunctiva/sclera: Conjunctivae normal.      Pupils: Pupils are equal, round, and reactive to light.   Neck:      Comments: Tracheostomy in place, site clean and dry  Cardiovascular:      Rate and Rhythm: Normal rate and regular rhythm.      Pulses: Normal pulses.      Heart sounds: Normal heart sounds. No murmur heard.     No friction rub. No gallop.   Pulmonary:      Effort: Pulmonary effort is normal. No respiratory distress.      Breath sounds: Normal breath sounds. No stridor. No wheezing, rhonchi or rales.      Comments: On mechanical ventilation via tracheostomy tube, no dyssynchrony seen on mechanical ventilation, no accessory muscle use   Chest:      Chest wall: No tenderness.   Abdominal:      General: Abdomen is flat. Bowel sounds are normal. There is no distension.      Palpations: Abdomen is soft.      Tenderness: There is no abdominal tenderness. There is no rebound.   Musculoskeletal:      Cervical back: Normal range of motion. No rigidity or tenderness.   Skin:     General: Skin is warm and dry.      Capillary Refill: Capillary refill takes less than 2 seconds.      Coloration: Skin is not jaundiced.      Findings: No bruising, erythema or rash.   Neurological:      Mental Status: Mental status is at baseline.      Comments: Left upper and left lower extremity contracted, patient was somnolent not following any commands not moving extremities randomly opens his eyes to loud stimulus but otherwise no purposeful activity   Psychiatric:      Comments: Unable to fully assess           Ventilator Settings  Vent Mode: A/C (08/21/24 0815)  Set Rate: 20 BPM (08/21/24 0815)  Vt Set: 470 mL (08/21/24 0815)  PEEP/CPAP: 5 cmH20 (08/21/24 0815)  Oxygen Concentration (%): 30 (08/21/24 0815)  Peak Airway Pressure: 12 cmH20 (08/21/24  "0815)  Total Ve: 11.9 L/m (08/21/24 0815)  F/VT Ratio<105 (RSBI): (!) 97.7 (08/21/24 0815)      Laboratory Studies:   No results for input(s): "PH", "PCO2", "PO2", "HCO3", "POCSATURATED", "BE" in the last 24 hours.  Recent Labs   Lab 08/21/24  0251   WBC 7.73   RBC 4.38*   HGB 11.6*   HCT 35.9*      MCV 82.0   MCH 26.5*   MCHC 32.3*     Recent Labs   Lab 08/21/24  0251   GLUCOSE 100      K 2.9*      CO2 29   BUN 8.7   CREATININE 0.75   CALCIUM 8.4*   MG 2.10         Microbiology Data:   Microbiology Results (last 7 days)       Procedure Component Value Units Date/Time    Respiratory Culture [[id number]] Collected: 08/20/24 0126    Order Status: Completed Specimen: Sputum from Endotracheal Aspirate Updated: 08/21/24 0710     Respiratory Culture Normal respiratory niyah     GRAM STAIN Quality 2+      Many Gram Positive Rods      Few Yeast      Few Gram positive cocci    Blood culture #1 **CANNOT BE ORDERED STAT** [[id number]]  (Normal) Collected: 08/19/24 0231    Order Status: Completed Specimen: Blood Updated: 08/21/24 0300     Blood Culture No Growth At 48 Hours    Blood culture #2 **CANNOT BE ORDERED STAT** [[id number]]  (Normal) Collected: 08/19/24 0231    Order Status: Completed Specimen: Blood Updated: 08/21/24 0300     Blood Culture No Growth At 48 Hours              Imaging:   X-Ray Chest 1 View  Narrative: EXAMINATION:  XR CHEST 1 VIEW    CPT [id number]    CLINICAL HISTORY:  concern for aspiration;    COMPARISON:  August 19, 2024    FINDINGS:  Examination reveals cardiomediastinal silhouette and pleuroparenchymal changes to be essentially unchanged as compared with the previous exam  Impression: No significant change as compared with the previous exam    Electronically signed by: Vik Keen  Date:    08/21/2024  Time:    07:58          Assessment and Plan    Assessment:    Sepsis 2/2 PNA  Acute hypoxemic hypercapnic respiratory failure  Hematemesis  Atrial fibrillation with RVR  H/o CVA " with L sided deficits (trach and PEG dependent)  H/o HTN, HLD, CAD        Plan:  -titrate mechanical ventilation for ARDS net protocol   -supplement oxygen to maintain saturation 88-92%   -attempted to place on CPAP again this morning with apnea, unclear with the patient was new baseline or rub presentation of acute illness   -continue daily spontaneous breathing trials/spontaneous awakening trial as appropriate   -continue routine tracheostomy care  -continue tube feeds to goal started on Reglan due to vomiting   -currently on vancomycin and Zosyn for treatment of healthcare associated pneumonia, finish a total of 7 days   -no plans for endoscopy per GI due to hematemesis no clear findings on imaging to warrant further workup or endoscopy   -plans to restart Xarelto when appropriate most likely tomorrow, held secondary to hematemesis   -resume home medications when appropriate  -replace phosphorus, replace potassium    DVT ppx/tx with SCD  GI ppx with protonix  Keep HOB elevated > 30*      The patient remains at high risk of decompensation and death and will remain in ICU level care     34 min of critical care time was spent reviewing the patient's chart including medications, radiographs, labs, pertinent cultures and pathology data, other consultant notes/recomendations as well as titration of vasopressors, adjustment of mechanical ventilatory or NIPPV support, as well as discussion of goals of care with nursing staff, respiratory therapy at the bedside and with family at the bedside/via phone.        Itz Francisco MD  8/21/2024  Pulmonology/Critical Care

## 2024-08-22 LAB
ALBUMIN SERPL-MCNC: 2.8 G/DL (ref 3.4–4.8)
ALBUMIN/GLOB SERPL: 0.9 RATIO (ref 1.1–2)
ALP SERPL-CCNC: 107 UNIT/L (ref 40–150)
ALT SERPL-CCNC: 17 UNIT/L (ref 0–55)
ANION GAP SERPL CALC-SCNC: 8 MEQ/L
AST SERPL-CCNC: 18 UNIT/L (ref 5–34)
BASOPHILS # BLD AUTO: 0.08 X10(3)/MCL
BASOPHILS NFR BLD AUTO: 1.3 %
BILIRUB SERPL-MCNC: 0.8 MG/DL
BUN SERPL-MCNC: 6.7 MG/DL (ref 8.4–25.7)
C DIFF TOX A+B STL QL IA: NEGATIVE
CALCIUM SERPL-MCNC: 8.3 MG/DL (ref 8.8–10)
CHLORIDE SERPL-SCNC: 107 MMOL/L (ref 98–107)
CLOSTRIDIUM DIFFICILE GDH ANTIGEN (OHS): NEGATIVE
CO2 SERPL-SCNC: 28 MMOL/L (ref 23–31)
CREAT SERPL-MCNC: 0.65 MG/DL (ref 0.73–1.18)
CREAT/UREA NIT SERPL: 10
EOSINOPHIL # BLD AUTO: 0.34 X10(3)/MCL (ref 0–0.9)
EOSINOPHIL NFR BLD AUTO: 5.6 %
ERYTHROCYTE [DISTWIDTH] IN BLOOD BY AUTOMATED COUNT: 15.7 % (ref 11.5–17)
GFR SERPLBLD CREATININE-BSD FMLA CKD-EPI: >60 ML/MIN/1.73/M2
GLOBULIN SER-MCNC: 3 GM/DL (ref 2.4–3.5)
GLUCOSE SERPL-MCNC: 110 MG/DL (ref 82–115)
HCT VFR BLD AUTO: 36.7 % (ref 42–52)
HGB BLD-MCNC: 11.4 G/DL (ref 14–18)
IMM GRANULOCYTES # BLD AUTO: 0.01 X10(3)/MCL (ref 0–0.04)
IMM GRANULOCYTES NFR BLD AUTO: 0.2 %
LYMPHOCYTES # BLD AUTO: 1.53 X10(3)/MCL (ref 0.6–4.6)
LYMPHOCYTES NFR BLD AUTO: 25 %
MAGNESIUM SERPL-MCNC: 2 MG/DL (ref 1.6–2.6)
MCH RBC QN AUTO: 26.5 PG (ref 27–31)
MCHC RBC AUTO-ENTMCNC: 31.1 G/DL (ref 33–36)
MCV RBC AUTO: 85.3 FL (ref 80–94)
MONOCYTES # BLD AUTO: 0.69 X10(3)/MCL (ref 0.1–1.3)
MONOCYTES NFR BLD AUTO: 11.3 %
NEUTROPHILS # BLD AUTO: 3.47 X10(3)/MCL (ref 2.1–9.2)
NEUTROPHILS NFR BLD AUTO: 56.6 %
NRBC BLD AUTO-RTO: 0 %
PHOSPHATE SERPL-MCNC: 3.5 MG/DL (ref 2.3–4.7)
PLATELET # BLD AUTO: 176 X10(3)/MCL (ref 130–400)
PMV BLD AUTO: 9.9 FL (ref 7.4–10.4)
POTASSIUM SERPL-SCNC: 3.1 MMOL/L (ref 3.5–5.1)
PROT SERPL-MCNC: 5.8 GM/DL (ref 5.8–7.6)
RBC # BLD AUTO: 4.3 X10(6)/MCL (ref 4.7–6.1)
SODIUM SERPL-SCNC: 143 MMOL/L (ref 136–145)
VANCOMYCIN TROUGH SERPL-MCNC: 23.4 UG/ML (ref 15–20)
WBC # BLD AUTO: 6.12 X10(3)/MCL (ref 4.5–11.5)

## 2024-08-22 PROCEDURE — 25000003 PHARM REV CODE 250: Performed by: INTERNAL MEDICINE

## 2024-08-22 PROCEDURE — 36415 COLL VENOUS BLD VENIPUNCTURE: CPT | Performed by: INTERNAL MEDICINE

## 2024-08-22 PROCEDURE — 84100 ASSAY OF PHOSPHORUS: CPT

## 2024-08-22 PROCEDURE — 99900026 HC AIRWAY MAINTENANCE (STAT)

## 2024-08-22 PROCEDURE — 27100171 HC OXYGEN HIGH FLOW UP TO 24 HOURS

## 2024-08-22 PROCEDURE — 80053 COMPREHEN METABOLIC PANEL: CPT

## 2024-08-22 PROCEDURE — 63600175 PHARM REV CODE 636 W HCPCS: Performed by: PHYSICIAN ASSISTANT

## 2024-08-22 PROCEDURE — 63600175 PHARM REV CODE 636 W HCPCS: Performed by: INTERNAL MEDICINE

## 2024-08-22 PROCEDURE — 80202 ASSAY OF VANCOMYCIN: CPT | Performed by: INTERNAL MEDICINE

## 2024-08-22 PROCEDURE — 94760 N-INVAS EAR/PLS OXIMETRY 1: CPT

## 2024-08-22 PROCEDURE — 63600175 PHARM REV CODE 636 W HCPCS

## 2024-08-22 PROCEDURE — 25000003 PHARM REV CODE 250: Performed by: NURSE PRACTITIONER

## 2024-08-22 PROCEDURE — 36415 COLL VENOUS BLD VENIPUNCTURE: CPT

## 2024-08-22 PROCEDURE — 86318 IA INFECTIOUS AGENT ANTIBODY: CPT | Performed by: INTERNAL MEDICINE

## 2024-08-22 PROCEDURE — 20000000 HC ICU ROOM

## 2024-08-22 PROCEDURE — 63600175 PHARM REV CODE 636 W HCPCS: Performed by: NURSE PRACTITIONER

## 2024-08-22 PROCEDURE — 83735 ASSAY OF MAGNESIUM: CPT

## 2024-08-22 PROCEDURE — 85025 COMPLETE CBC W/AUTO DIFF WBC: CPT

## 2024-08-22 PROCEDURE — 99900031 HC PATIENT EDUCATION (STAT)

## 2024-08-22 PROCEDURE — 99900035 HC TECH TIME PER 15 MIN (STAT)

## 2024-08-22 PROCEDURE — 94003 VENT MGMT INPAT SUBQ DAY: CPT

## 2024-08-22 RX ORDER — VENLAFAXINE 37.5 MG/1
75 TABLET ORAL DAILY
Status: DISCONTINUED | OUTPATIENT
Start: 2024-08-22 | End: 2024-08-23 | Stop reason: HOSPADM

## 2024-08-22 RX ORDER — POTASSIUM CHLORIDE 14.9 MG/ML
20 INJECTION INTRAVENOUS
Status: COMPLETED | OUTPATIENT
Start: 2024-08-22 | End: 2024-08-22

## 2024-08-22 RX ORDER — MORPHINE SULFATE 4 MG/ML
1 INJECTION, SOLUTION INTRAMUSCULAR; INTRAVENOUS ONCE
Status: COMPLETED | OUTPATIENT
Start: 2024-08-22 | End: 2024-08-22

## 2024-08-22 RX ADMIN — METOCLOPRAMIDE 5 MG: 5 INJECTION, SOLUTION INTRAMUSCULAR; INTRAVENOUS at 08:08

## 2024-08-22 RX ADMIN — MICONAZOLE NITRATE 2 % TOPICAL POWDER: at 08:08

## 2024-08-22 RX ADMIN — PIPERACILLIN SODIUM AND TAZOBACTAM SODIUM 4.5 G: 4; .5 INJECTION, POWDER, LYOPHILIZED, FOR SOLUTION INTRAVENOUS at 02:08

## 2024-08-22 RX ADMIN — SUCRALFATE 1 G: 1 TABLET ORAL at 05:08

## 2024-08-22 RX ADMIN — BUSPIRONE HYDROCHLORIDE 5 MG: 5 TABLET ORAL at 08:08

## 2024-08-22 RX ADMIN — Medication: at 08:08

## 2024-08-22 RX ADMIN — SUCRALFATE 1 G: 1 TABLET ORAL at 04:08

## 2024-08-22 RX ADMIN — MUPIROCIN: 20 OINTMENT TOPICAL at 08:08

## 2024-08-22 RX ADMIN — PIPERACILLIN SODIUM AND TAZOBACTAM SODIUM 4.5 G: 4; .5 INJECTION, POWDER, LYOPHILIZED, FOR SOLUTION INTRAVENOUS at 07:08

## 2024-08-22 RX ADMIN — POTASSIUM CHLORIDE 20 MEQ: 14.9 INJECTION, SOLUTION INTRAVENOUS at 08:08

## 2024-08-22 RX ADMIN — PIPERACILLIN SODIUM AND TAZOBACTAM SODIUM 4.5 G: 4; .5 INJECTION, POWDER, LYOPHILIZED, FOR SOLUTION INTRAVENOUS at 11:08

## 2024-08-22 RX ADMIN — METOPROLOL TARTRATE 100 MG: 50 TABLET, FILM COATED ORAL at 08:08

## 2024-08-22 RX ADMIN — VANCOMYCIN HYDROCHLORIDE 1250 MG: 1.25 INJECTION, POWDER, LYOPHILIZED, FOR SOLUTION INTRAVENOUS at 03:08

## 2024-08-22 RX ADMIN — QUETIAPINE FUMARATE 25 MG: 25 TABLET ORAL at 08:08

## 2024-08-22 RX ADMIN — SCOPOLAMINE 1 PATCH: 1 PATCH TRANSDERMAL at 09:08

## 2024-08-22 RX ADMIN — POTASSIUM CHLORIDE 20 MEQ: 14.9 INJECTION, SOLUTION INTRAVENOUS at 05:08

## 2024-08-22 RX ADMIN — PANTOPRAZOLE SODIUM 40 MG: 40 INJECTION, POWDER, LYOPHILIZED, FOR SOLUTION INTRAVENOUS at 08:08

## 2024-08-22 RX ADMIN — SUCRALFATE 1 G: 1 TABLET ORAL at 08:08

## 2024-08-22 RX ADMIN — CHOLESTYRAMINE 4 G: 4 POWDER, FOR SUSPENSION ORAL at 09:08

## 2024-08-22 RX ADMIN — TAMSULOSIN HYDROCHLORIDE 0.4 MG: 0.4 CAPSULE ORAL at 08:08

## 2024-08-22 RX ADMIN — ONDANSETRON 4 MG: 2 INJECTION INTRAMUSCULAR; INTRAVENOUS at 10:08

## 2024-08-22 RX ADMIN — DILTIAZEM HYDROCHLORIDE 60 MG: 60 TABLET, FILM COATED ORAL at 11:08

## 2024-08-22 RX ADMIN — BUSPIRONE HYDROCHLORIDE 5 MG: 5 TABLET ORAL at 03:08

## 2024-08-22 RX ADMIN — LEVETIRACETAM 1500 MG: 100 SOLUTION ORAL at 08:08

## 2024-08-22 RX ADMIN — DILTIAZEM HYDROCHLORIDE 60 MG: 60 TABLET, FILM COATED ORAL at 06:08

## 2024-08-22 RX ADMIN — VANCOMYCIN HYDROCHLORIDE 1500 MG: 1.5 INJECTION, POWDER, LYOPHILIZED, FOR SOLUTION INTRAVENOUS at 09:08

## 2024-08-22 RX ADMIN — VENLAFAXINE 75 MG: 37.5 TABLET ORAL at 08:08

## 2024-08-22 RX ADMIN — FINASTERIDE 5 MG: 5 TABLET, FILM COATED ORAL at 08:08

## 2024-08-22 RX ADMIN — SUCRALFATE 1 G: 1 TABLET ORAL at 11:08

## 2024-08-22 RX ADMIN — DILTIAZEM HYDROCHLORIDE 60 MG: 60 TABLET, FILM COATED ORAL at 05:08

## 2024-08-22 RX ADMIN — MORPHINE SULFATE 1 MG: 4 INJECTION, SOLUTION INTRAMUSCULAR; INTRAVENOUS at 06:08

## 2024-08-22 NOTE — PROGRESS NOTES
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 Pt resides in nursing home. Presented to ED from NH 8/19/24 due to concern for coffee ground emesis from mouth/trach. On initial presentation, he was found to be febrile to 101.4F, tachycardic, hypotensive, saturating 98% on 4L trach collar. labs with leukocytosis of 16,000, H/H 14.6/45.9, INR 1.4, .1, troponin 0.035, and lactic acid 2.6. EKG revealed atrial fibrillation with RVR and heart rate of 125. CT chest/abdomen/pelvis with IV contrast demonstrated bilateral lung infiltrates, which may represent developing infectious process, normal esophagus and stomach. In ED, emesis tested + for occult blood. Given Vanc, Zosyn, IV Protonix, IV Digoxin 250mcg. Admitted to Hospital Medicine. GI consulted; given Hgb normal, no plans for endoscopy. Recommended continued PPI and slow tube feeds.     While on floor, nursing staff noted change in mentation with patient becoming more obtunded. Rapid response called. ABG revealed pH 7.09, pCO2 >115. Pt placed on mechanical ventilation and upgraded to ICU on 8/19      Interval History:  -no major issues or changes overnight   -potassium 3.1   -urine output of 2.7 L  -patient was lying in bed somnolent not perform any purposeful activities which is his baseline  -attempts to place on CPAP demonstrates apnea  -sputum culture from 08/20 with Gram-negative rods    Scheduled Medications:    busPIRone  5 mg Per G Tube TID    cholestyramine  1 packet Per G Tube Daily    diltiaZEM  60 mg Per G Tube Q6H    finasteride  5 mg Oral Daily    levetiracetam  1,500 mg Per G Tube BID    metoclopramide  5 mg Intravenous BID     metoprolol tartrate  100 mg Per G Tube BID    miconazole NITRATE 2 %   Topical (Top) BID    mupirocin   Nasal BID    pantoprazole  40 mg Intravenous BID    piperacillin-tazobactam (Zosyn) IV (PEDS and ADULTS) (extended infusion is not appropriate)  4.5 g Intravenous Q8H    QUEtiapine  25 mg Per G Tube BID    scopolamine  1 patch Transdermal Q3 Days    sucralfate  1 g Per G Tube QID (AC & HS)    tamsulosin  0.4 mg Oral QHS    vancomycin (VANCOCIN) IV (PEDS and ADULTS)  1,250 mg Intravenous Q12H    venlafaxine  75 mg Per G Tube Daily    zinc oxide-cod liver oil   Topical (Top) BID       PRN Medications:     Current Facility-Administered Medications:     acetaminophen, 650 mg, Oral, Q6H PRN    aluminum-magnesium hydroxide-simethicone, 30 mL, Oral, QID PRN    melatonin, 6 mg, Oral, Nightly PRN    naloxone, 0.02 mg, Intravenous, PRN    ondansetron, 4 mg, Intravenous, Q4H PRN    polyethylene glycol, 17 g, Oral, BID PRN    prochlorperazine, 5 mg, Intravenous, Q6H PRN    simethicone, 1 tablet, Oral, QID PRN    vancomycin - pharmacy to dose, , Intravenous, pharmacy to manage frequency      Infusions:     NORepinephrine bitartrate-D5W  0-3 mcg/kg/min Intravenous Continuous   Stopped at 08/19/24 2358         Fluid Balance:     Intake/Output Summary (Last 24 hours) at 8/22/2024 1112  Last data filed at 8/22/2024 0620  Gross per 24 hour   Intake 1384.71 ml   Output 1700 ml   Net -315.29 ml         Vital Signs:   Vitals:    08/22/24 1000   BP: 117/77   Pulse: 65   Resp: 20   Temp:          Physical Exam  Vitals and nursing note reviewed.   Constitutional:       Appearance: He is ill-appearing.   HENT:      Head: Normocephalic.      Right Ear: External ear normal.      Left Ear: External ear normal.      Nose: Nose normal.      Mouth/Throat:      Mouth: Mucous membranes are moist.      Pharynx: Oropharynx is clear. No oropharyngeal exudate or posterior oropharyngeal erythema.   Eyes:      General: No scleral icterus.      Extraocular Movements: Extraocular movements intact.      Conjunctiva/sclera: Conjunctivae normal.      Pupils: Pupils are equal, round, and reactive to light.   Neck:      Comments: Tracheostomy in place, site clean and dry  Cardiovascular:      Rate and Rhythm: Normal rate and regular rhythm.      Pulses: Normal pulses.      Heart sounds: Normal heart sounds. No murmur heard.     No friction rub. No gallop.   Pulmonary:      Effort: Pulmonary effort is normal. No respiratory distress.      Breath sounds: Normal breath sounds. No stridor. No wheezing, rhonchi or rales.      Comments: On mechanical ventilation via tracheostomy tube, no dyssynchrony seen on mechanical ventilation, no accessory muscle use   Chest:      Chest wall: No tenderness.   Abdominal:      General: Abdomen is flat. Bowel sounds are normal. There is no distension.      Palpations: Abdomen is soft.      Tenderness: There is no abdominal tenderness. There is no rebound.   Musculoskeletal:      Cervical back: Normal range of motion. No rigidity or tenderness.   Skin:     General: Skin is warm and dry.      Capillary Refill: Capillary refill takes less than 2 seconds.      Coloration: Skin is not jaundiced.      Findings: No bruising, erythema or rash.   Neurological:      Mental Status: Mental status is at baseline.      Comments: Left upper and left lower extremity contracted, patient was somnolent not following any commands not moving extremities randomly opens his eyes to loud stimulus but otherwise no purposeful activity   Psychiatric:      Comments: Unable to fully assess           Ventilator Settings  Vent Mode: A/C (08/22/24 0945)  Ventilator Initiated: Yes (08/22/24 0945)  Set Rate: 20 BPM (08/22/24 0945)  Vt Set: 470 mL (08/22/24 0945)  PEEP/CPAP: 5 cmH20 (08/22/24 0945)  Oxygen Concentration (%): 30 (08/22/24 1000)  Peak Airway Pressure: 21 cmH20 (08/22/24 0945)  Total Ve: 7.7 L/m (08/22/24 0945)  F/VT Ratio<105 (RSBI): (!) 51.28  "(08/22/24 0945)      Laboratory Studies:   No results for input(s): "PH", "PCO2", "PO2", "HCO3", "POCSATURATED", "BE" in the last 24 hours.  Recent Labs   Lab 08/22/24  0329   WBC 6.12   RBC 4.30*   HGB 11.4*   HCT 36.7*      MCV 85.3   MCH 26.5*   MCHC 31.1*     Recent Labs   Lab 08/22/24  0329   GLUCOSE 110      K 3.1*      CO2 28   BUN 6.7*   CREATININE 0.65*   CALCIUM 8.3*   MG 2.00         Microbiology Data:   Microbiology Results (last 7 days)       Procedure Component Value Units Date/Time    Respiratory Culture [[id number]]  (Abnormal) Collected: 08/20/24 0126    Order Status: Completed Specimen: Sputum from Endotracheal Aspirate Updated: 08/22/24 1018     Respiratory Culture Moderate Gram-negative Rods     Comment: with normal respiratory niyah        GRAM STAIN Quality 2+      Many Gram Positive Rods      Few Yeast      Few Gram positive cocci    Blood culture #1 **CANNOT BE ORDERED STAT** [[id number]]  (Normal) Collected: 08/19/24 0231    Order Status: Completed Specimen: Blood Updated: 08/22/24 0300     Blood Culture No Growth At 72 Hours    Blood culture #2 **CANNOT BE ORDERED STAT** [[id number]]  (Normal) Collected: 08/19/24 0231    Order Status: Completed Specimen: Blood Updated: 08/22/24 0300     Blood Culture No Growth At 72 Hours              Imaging:   X-Ray Chest 1 View  Narrative: EXAMINATION:  XR CHEST 1 VIEW    CPT 56078    CLINICAL HISTORY:  concern for aspiration;    COMPARISON:  August 19, 2024    FINDINGS:  Examination reveals cardiomediastinal silhouette and pleuroparenchymal changes to be essentially unchanged as compared with the previous exam  Impression: No significant change as compared with the previous exam    Electronically signed by: Vik Keen  Date:    08/21/2024  Time:    07:58          Assessment and Plan    Assessment:    Sepsis 2/2 PNA  Acute hypoxemic hypercapnic respiratory failure  Hematemesis  Atrial fibrillation with RVR  H/o CVA with L sided " deficits (trach and PEG dependent)  H/o HTN, HLD, CAD        Plan:  -titrate mechanical ventilation for ARDS net protocol   -supplement oxygen to maintain saturation 88-92%   -attempted to place on CPAP again this morning with apnea, continue daily attempts before determining if the patient was now ventilator dependent  -continue daily spontaneous breathing trials/spontaneous awakening trial as appropriate   -continue routine tracheostomy care  -continue tube feeds to goal started on Reglan due to vomiting   -currently on vancomycin and Zosyn for treatment of healthcare associated pneumonia, finish a total of 7 days -sputum culture from 08/20 positive for Gram-negative rods with previous sputum culture in July of 2024 positive for Acinetobacter and Proteus  -no plans for endoscopy per GI due to hematemesis no clear findings on imaging to warrant further workup or endoscopy   -plans to restart Xarelto when appropriate most likely tomorrow, held secondary to hematemesis   -resume home medications when appropriate  -replace potassium    DVT ppx/tx with SCD  GI ppx with protonix  Keep HOB elevated > 30*      The patient remains at high risk of decompensation and death and will remain in ICU level care     36 min of critical care time was spent reviewing the patient's chart including medications, radiographs, labs, pertinent cultures and pathology data, other consultant notes/recomendations as well as titration of vasopressors, adjustment of mechanical ventilatory or NIPPV support, as well as discussion of goals of care with nursing staff, respiratory therapy at the bedside and with family at the bedside/via phone.        Itz Francisco MD  8/22/2024  Pulmonology/Critical Care

## 2024-08-22 NOTE — PROGRESS NOTES
"Gastroenterology Progress Note    Subjective/Interval History:  Patient was tolerating continuous tube feeds at 70 cc/hour without residual.  Goal is reportedly 75 cc per hour.  He is having bowel movements that are brown in color without signs of melena or hematochezia.  There has been no vomiting.    ROS:  Review of Systems   Unable to perform ROS: Patient nonverbal     Vital Signs:  /77   Pulse 65   Temp 99.1 °F (37.3 °C) (Oral)   Resp 20   Ht 5' 10" (1.778 m)   Wt 90.7 kg (200 lb)   SpO2 100%   BMI 28.70 kg/m²   Body mass index is 28.7 kg/m².    Physical Exam:  Physical Exam  Constitutional:       General: He is not in acute distress.     Appearance: He is ill-appearing (chronically).   HENT:      Head: Normocephalic and atraumatic.      Comments: Crani scar  Eyes:      General: No scleral icterus.  Cardiovascular:      Rate and Rhythm: Normal rate and regular rhythm.   Pulmonary:      Effort: Pulmonary effort is normal. No respiratory distress.      Comments: Trach on mechanical ventilation.  Abdominal:      General: Bowel sounds are normal. There is no distension.      Palpations: Abdomen is soft. There is no mass.      Tenderness: There is no abdominal tenderness. There is no guarding or rebound.      Comments: PEG in place in epigastrium.  External bumper not flush with skin.  Markings no longer able to be seen. TFs going at 70 cc/hour.  No residuals at this time.   Musculoskeletal:      Right lower leg: No edema.      Left lower leg: No edema.      Comments: Contracted LUE   Skin:     General: Skin is warm and dry.      Coloration: Skin is not jaundiced or pale.   Neurological:      Comments: Does not respond or follow commands.    Psychiatric:      Comments: Unable to assess     Labs:  Recent Results (from the past 48 hour(s))   Urinalysis, Reflex to Urine Culture    Collection Time: 08/20/24  1:51 PM    Specimen: Urine, Catheterized   Result Value Ref Range    Color, UA Yellow Yellow, " Light-Yellow, Colorless, Straw, Dark-Yellow    Appearance, UA Clear Clear    Specific Gravity, UA 1.031 (H) 1.005 - 1.030    pH, UA 8.0 5.0 - 8.5    Protein, UA 1+ (A) Negative    Glucose, UA Normal Negative, Normal    Ketones, UA Negative Negative    Blood, UA 2+ (A) Negative    Bilirubin, UA Negative Negative    Urobilinogen, UA Normal 0.2, 1.0, Normal    Nitrites, UA Negative Negative    Leukocyte Esterase, UA Negative Negative    RBC, UA >100 (A) None Seen, 0-2, 3-5, 0-5 /HPF    WBC, UA 6-10 (A) None Seen, 0-2, 3-5, 0-5 /HPF    Bacteria, UA None Seen None Seen, Trace /HPF    Squamous Epithelial Cells, UA None Seen None Seen /HPF    Mucous, UA Trace (A) None Seen /LPF    Amorphous Crystal, UA Trace (A) None Seen /uL   VANCOMYCIN, TROUGH    Collection Time: 08/20/24  3:56 PM   Result Value Ref Range    Vancomycin Trough 36.4 (HH) 15.0 - 20.0 ug/ml   VANCOMYCIN, TROUGH    Collection Time: 08/21/24  2:51 AM   Result Value Ref Range    Vancomycin Trough 19.7 15.0 - 20.0 ug/ml   Comprehensive Metabolic Panel    Collection Time: 08/21/24  2:51 AM   Result Value Ref Range    Sodium 142 136 - 145 mmol/L    Potassium 2.9 (L) 3.5 - 5.1 mmol/L    Chloride 104 98 - 107 mmol/L    CO2 29 23 - 31 mmol/L    Glucose 100 82 - 115 mg/dL    Blood Urea Nitrogen 8.7 8.4 - 25.7 mg/dL    Creatinine 0.75 0.73 - 1.18 mg/dL    Calcium 8.4 (L) 8.8 - 10.0 mg/dL    Protein Total 5.8 5.8 - 7.6 gm/dL    Albumin 2.9 (L) 3.4 - 4.8 g/dL    Globulin 2.9 2.4 - 3.5 gm/dL    Albumin/Globulin Ratio 1.0 (L) 1.1 - 2.0 ratio    Bilirubin Total 1.4 <=1.5 mg/dL     40 - 150 unit/L    ALT 17 0 - 55 unit/L    AST 19 5 - 34 unit/L    eGFR >60 mL/min/1.73/m2    Anion Gap 9.0 mEq/L    BUN/Creatinine Ratio 12    Magnesium    Collection Time: 08/21/24  2:51 AM   Result Value Ref Range    Magnesium Level 2.10 1.60 - 2.60 mg/dL   Phosphorus    Collection Time: 08/21/24  2:51 AM   Result Value Ref Range    Phosphorus Level 1.7 (L) 2.3 - 4.7 mg/dL   CBC with  Differential    Collection Time: 08/21/24  2:51 AM   Result Value Ref Range    WBC 7.73 4.50 - 11.50 x10(3)/mcL    RBC 4.38 (L) 4.70 - 6.10 x10(6)/mcL    Hgb 11.6 (L) 14.0 - 18.0 g/dL    Hct 35.9 (L) 42.0 - 52.0 %    MCV 82.0 80.0 - 94.0 fL    MCH 26.5 (L) 27.0 - 31.0 pg    MCHC 32.3 (L) 33.0 - 36.0 g/dL    RDW 15.9 11.5 - 17.0 %    Platelet 167 130 - 400 x10(3)/mcL    MPV 9.9 7.4 - 10.4 fL    Neut % 72.9 %    Lymph % 17.7 %    Mono % 6.5 %    Eos % 2.1 %    Basophil % 0.5 %    Lymph # 1.37 0.6 - 4.6 x10(3)/mcL    Neut # 5.64 2.1 - 9.2 x10(3)/mcL    Mono # 0.50 0.1 - 1.3 x10(3)/mcL    Eos # 0.16 0 - 0.9 x10(3)/mcL    Baso # 0.04 <=0.2 x10(3)/mcL    IG# 0.02 0 - 0.04 x10(3)/mcL    IG% 0.3 %    NRBC% 0.0 %   Comprehensive Metabolic Panel    Collection Time: 08/22/24  3:29 AM   Result Value Ref Range    Sodium 143 136 - 145 mmol/L    Potassium 3.1 (L) 3.5 - 5.1 mmol/L    Chloride 107 98 - 107 mmol/L    CO2 28 23 - 31 mmol/L    Glucose 110 82 - 115 mg/dL    Blood Urea Nitrogen 6.7 (L) 8.4 - 25.7 mg/dL    Creatinine 0.65 (L) 0.73 - 1.18 mg/dL    Calcium 8.3 (L) 8.8 - 10.0 mg/dL    Protein Total 5.8 5.8 - 7.6 gm/dL    Albumin 2.8 (L) 3.4 - 4.8 g/dL    Globulin 3.0 2.4 - 3.5 gm/dL    Albumin/Globulin Ratio 0.9 (L) 1.1 - 2.0 ratio    Bilirubin Total 0.8 <=1.5 mg/dL     40 - 150 unit/L    ALT 17 0 - 55 unit/L    AST 18 5 - 34 unit/L    eGFR >60 mL/min/1.73/m2    Anion Gap 8.0 mEq/L    BUN/Creatinine Ratio 10    Magnesium    Collection Time: 08/22/24  3:29 AM   Result Value Ref Range    Magnesium Level 2.00 1.60 - 2.60 mg/dL   Phosphorus    Collection Time: 08/22/24  3:29 AM   Result Value Ref Range    Phosphorus Level 3.5 2.3 - 4.7 mg/dL   CBC with Differential    Collection Time: 08/22/24  3:29 AM   Result Value Ref Range    WBC 6.12 4.50 - 11.50 x10(3)/mcL    RBC 4.30 (L) 4.70 - 6.10 x10(6)/mcL    Hgb 11.4 (L) 14.0 - 18.0 g/dL    Hct 36.7 (L) 42.0 - 52.0 %    MCV 85.3 80.0 - 94.0 fL    MCH 26.5 (L) 27.0 - 31.0 pg     MCHC 31.1 (L) 33.0 - 36.0 g/dL    RDW 15.7 11.5 - 17.0 %    Platelet 176 130 - 400 x10(3)/mcL    MPV 9.9 7.4 - 10.4 fL    Neut % 56.6 %    Lymph % 25.0 %    Mono % 11.3 %    Eos % 5.6 %    Basophil % 1.3 %    Lymph # 1.53 0.6 - 4.6 x10(3)/mcL    Neut # 3.47 2.1 - 9.2 x10(3)/mcL    Mono # 0.69 0.1 - 1.3 x10(3)/mcL    Eos # 0.34 0 - 0.9 x10(3)/mcL    Baso # 0.08 <=0.2 x10(3)/mcL    IG# 0.01 0 - 0.04 x10(3)/mcL    IG% 0.2 %    NRBC% 0.0 %       Imaging:  X-Ray Chest 1 View    Result Date: 8/21/2024  EXAMINATION: XR CHEST 1 VIEW CPT 77144 CLINICAL HISTORY: concern for aspiration; COMPARISON: August 19, 2024 FINDINGS: Examination reveals cardiomediastinal silhouette and pleuroparenchymal changes to be essentially unchanged as compared with the previous exam     No significant change as compared with the previous exam Electronically signed by: Vik Keen Date:    08/21/2024 Time:    07:58    X-Ray Chest 1 View    Result Date: 8/20/2024  EXAMINATION: XR CHEST 1 VIEW CPT 91356 CLINICAL HISTORY: pna; COMPARISON: August 19, 2024 FINDINGS: Examination reveals cardiomediastinal silhouette and pleuroparenchymal changes to be essentially unchanged as compared with the previous exam. Slightly more confluent opacities in the left retrocardiac region early infiltrate cannot be excluded. Support catheters remain in place     Slightly more confluent opacities in the left retrocardiac region infiltrate can not be completely excluded. No other significant change Electronically signed by: Vik Keen Date:    08/20/2024 Time:    08:47    X-Ray Chest 1 View    Result Date: 8/19/2024  EXAMINATION XR CHEST 1 VIEW CLINICAL HISTORY Cough, unspecified TECHNIQUE A total of 1 frontal image(s) of the chest. COMPARISON 6 August 2024 FINDINGS Lines/tubes/devices: Grossly unchanged positioning when allowing for differences in technique and patient rotation. The cardiac silhouette and central vascular structures are unchanged.  The  trachea is midline. No new or worsening consolidation is identified. There is no enlarging pleural effusion or convincing pneumothorax. Regional osseous structures and extrathoracic soft tissues are similar. IMPRESSION No significant interval change. Electronically signed by: Isaac Carcamo Date:    08/19/2024 Time:    07:16    CT Chest Abdomen Pelvis With IV Contrast (XPD)    Result Date: 8/19/2024  EXAMINATION CT CHEST ABDOMEN PELVIS WITH IV CONTRAST (XPD) CLINICAL HISTORY Sepsis; TECHNIQUE Post-contrast helical-acquisition CT images were obtained and multiplanar reformats accomplished by a CT technologist at a separate workstation and pushed to PACS for physician review. CONTRAST *IV: Omnipaque 350, 100 mL *Enteric: none COMPARISON *18 July 2024 chest CT *4 August 2024 abdominopelvic CT FINDINGS Images were reviewed in soft tissue, lung, and bone windows. Exam quality: adequate for evaluation Lines/tubes: Unchanged tracheostomy and right chest wall cardiac device.  Left upper quadrant PEG tube and Fernandez catheter remain in place. Similar dilated appearance of the heart chambers.  Mediastinal vasculature is unchanged.  No development of pericardial effusion.  Bilateral lung infiltrates developed in the interval.  No pleural effusion or pneumothorax. Gallbladder and bile ducts are similar.  No significant change of the upper abdominal solid organs in the relatively short interval.  There is no radiodense urolithiasis or findings of distal obstructive uropathy.  Urinary bladder is nondistended, precluding adequate evaluation.  Prostate gland is unchanged. Esophagus is unremarkable.  Stomach nondistended.  No findings of high-grade mechanical bowel obstruction or other acute gastrointestinal process.  Appendix is normal.  Small bowel anastomosis at the right lower quadrant unchanged in comparison. There is no free intra-abdominal air or fluid.  No drainable collections.  Aortoiliac tapering through the abdomen and  pelvis is unchanged in comparison.  Similar mild burden of scattered atherosclerotic calcification.  No development of pathologic lymph node enlargement. Advanced chronic alterations with heterotopic ossification at the left hip unchanged in the interval.  Remaining visualized osseous structures are stable in comparison.  No new or worsening subcutaneous or muscular abnormality. IMPRESSION 1. New bilateral lung infiltrates may represent developing infectious process. 2. Otherwise, unchanged CT appearance of the visualized structures.  Chronic secondary findings discussed above. RADIATION DOSE Automated tube current modulation, weight-based exposure dosing, and/or iterative reconstruction technique utilized to reach lowest reasonably achievable exposure rate. DLP: 1534 mGy*cm Electronically signed by: Isaac Carcamo Date:    08/19/2024 Time:    07:15    X-Ray Chest 1 View    Result Date: 8/6/2024  EXAMINATION: XR CHEST 1 VIEW CPT 70471 CLINICAL HISTORY: Sob; COMPARISON: August 3, 2024 FINDINGS: Examination reveals cardiomediastinal silhouette and pleuroparenchymal changes are essentially unchanged as compared with the previous exam     No significant change Electronically signed by: Vik Keen Date:    08/06/2024 Time:    14:06    CT Abdomen Pelvis With IV Contrast NO Oral Contrast    Result Date: 8/4/2024  EXAMINATION: CT ABDOMEN PELVIS WITH IV CONTRAST CLINICAL HISTORY: Abdominal pain, acute, nonlocalized;Nausea/vomiting; TECHNIQUE: Helical acquisition through the abdomen and pelvis with IV contrast.  Three plane reconstructions were provided for review. DLP 1610 mGycm. Automatic exposure control, adjustment of mA/kV or iterative reconstruction technique was used to reduce radiation. COMPARISON: 18 July 2024 FINDINGS: Motion degraded scan. The limited imaged lung bases are clear. No acute findings liver, gallbladder, spleen, pancreas or adrenals.  No hydronephrosis. There is prominent distal esophageal wall  thickening.  A gastrostomy tube is in place.  No significant inflammatory changes of the small or large bowel.  Normal appendix.  No free air. There is a Fernandez in the urinary bladder.  No pelvic free fluid.  Abdominal aorta normal in caliber.  Mild atherosclerotic disease. There are no acute osseous findings.  Prominent heterotopic bone around the left hip.  Is     1. Prominent distal esophageal wall thickening suggestive of esophagitis. 2. Otherwise no acute abdominopelvic findings.  Chronic findings above. 3. No significant discrepancy with the preliminary report. Electronically signed by: Tani Calderon Date:    08/04/2024 Time:    09:25    X-Ray Chest AP Portable    Result Date: 8/3/2024  EXAMINATION: XR CHEST AP PORTABLE CLINICAL HISTORY: Vomiting, unspecified TECHNIQUE: One view COMPARISON: July 21, 2024. FINDINGS: Cardiopericardial silhouette appearance is similar.  Cardiac device electrodes are in similar location.  Left upper chest is obscured by the mandible.  No overt edema or consolidation.  There are right infrahilar lower lung lobe atelectatic changes.  Possible small left pleural effusion.     Right infrahilar lower lung zone atelectasis and possible small left pleural effusion. Electronically signed by: Sami Taylor Date:    08/03/2024 Time:    22:09    CT Head Without Contrast    Result Date: 7/23/2024  EXAMINATION: CT HEAD WITHOUT CONTRAST CLINICAL HISTORY: Implant planning; TECHNIQUE: CT imaging of the head and neck performed according to a planning protocol.  DLP 1255 mGycm. Automatic exposure control, adjustment of mA/kV or iterative reconstruction technique was used to reduce radiation. COMPARISON: 18 July 2024 FINDINGS: Perhaps slightly increased herniation of the brain through the craniectomy defect.  No new parenchymal attenuation abnormality.  No acute hemorrhage seen.  Little if any midline shift.  Similar ventricular enlargement.  There are vascular calcifications.     CT for surgical  planning.  Perhaps slightly increased herniation of the brain through the craniectomy defect. Electronically signed by: Tani Calderon Date:    07/23/2024 Time:    17:16         Assessment/Plan:  68 y.o. male known to Dr. Escalona from inpatient care (primary GI is Dr. 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  Presented from nursing home for concerns of dark vomiting from mouth and trach.  Admitted with pneumonia, sepsis, and acute respiratory failure requiring mechanical ventilation.  GI initially consulted due to concern for coffee ground emesis.  Then now called back due to vomiting TFs.      TF intolerance / vomiting - improved.  - Keep HOB elevated at all times.   - continue low dose reglan 5 mg BID.  - current residuals 0 and patient was being increased to goal at this time.  Continue to monitor cc q4 hours.  Monitor TF residuals q4 hours for another 24 hours and hold if >150.    Coffee ground emesis - resolved  Hgb 13 -- 12.4 -- 11.6   CT reports normal esophagus and stomach.   - Continue ppi  - No plans for endoscopy at this time.      Please call GI if needed    Darlyn Singh PA-C

## 2024-08-22 NOTE — NURSING
Nurses Note -- 4 Eyes      8/22/2024   5:15 AM      Skin assessed during: Daily Assessment      [] No Altered Skin Integrity Present    [x]Prevention Measures Documented      [x] Yes- Altered Skin Integrity Present or Discovered   [] LDA Added if Not in Epic (Describe Wound)   [] New Altered Skin Integrity was Present on Admit and Documented in LDA   [] Wound Image Taken    Wound Care Consulted? Yes    Attending Nurse:  Marianna Johnson RN/Staff Member:  COLIN Horne

## 2024-08-23 VITALS
OXYGEN SATURATION: 100 % | HEIGHT: 70 IN | HEART RATE: 79 BPM | BODY MASS INDEX: 28.62 KG/M2 | DIASTOLIC BLOOD PRESSURE: 97 MMHG | WEIGHT: 199.94 LBS | RESPIRATION RATE: 16 BRPM | SYSTOLIC BLOOD PRESSURE: 138 MMHG | TEMPERATURE: 99 F

## 2024-08-23 PROBLEM — J18.9 PNEUMONIA DUE TO INFECTIOUS ORGANISM: Status: ACTIVE | Noted: 2024-08-23

## 2024-08-23 LAB
ALBUMIN SERPL-MCNC: 2.8 G/DL (ref 3.4–4.8)
ALBUMIN/GLOB SERPL: 0.8 RATIO (ref 1.1–2)
ALP SERPL-CCNC: 103 UNIT/L (ref 40–150)
ALT SERPL-CCNC: 15 UNIT/L (ref 0–55)
ANION GAP SERPL CALC-SCNC: 10 MEQ/L
AST SERPL-CCNC: 15 UNIT/L (ref 5–34)
BASOPHILS # BLD AUTO: 0.05 X10(3)/MCL
BASOPHILS NFR BLD AUTO: 1 %
BILIRUB SERPL-MCNC: 0.7 MG/DL
BUN SERPL-MCNC: 9.2 MG/DL (ref 8.4–25.7)
CALCIUM SERPL-MCNC: 8.3 MG/DL (ref 8.8–10)
CHLORIDE SERPL-SCNC: 108 MMOL/L (ref 98–107)
CO2 SERPL-SCNC: 27 MMOL/L (ref 23–31)
CREAT SERPL-MCNC: 0.69 MG/DL (ref 0.73–1.18)
CREAT/UREA NIT SERPL: 13
EOSINOPHIL # BLD AUTO: 0.29 X10(3)/MCL (ref 0–0.9)
EOSINOPHIL NFR BLD AUTO: 5.6 %
ERYTHROCYTE [DISTWIDTH] IN BLOOD BY AUTOMATED COUNT: 15.7 % (ref 11.5–17)
GFR SERPLBLD CREATININE-BSD FMLA CKD-EPI: >60 ML/MIN/1.73/M2
GLOBULIN SER-MCNC: 3.5 GM/DL (ref 2.4–3.5)
GLUCOSE SERPL-MCNC: 72 MG/DL (ref 82–115)
HCT VFR BLD AUTO: 35.1 % (ref 42–52)
HGB BLD-MCNC: 10.9 G/DL (ref 14–18)
IMM GRANULOCYTES # BLD AUTO: 0.01 X10(3)/MCL (ref 0–0.04)
IMM GRANULOCYTES NFR BLD AUTO: 0.2 %
LYMPHOCYTES # BLD AUTO: 1.7 X10(3)/MCL (ref 0.6–4.6)
LYMPHOCYTES NFR BLD AUTO: 32.8 %
MAGNESIUM SERPL-MCNC: 2 MG/DL (ref 1.6–2.6)
MCH RBC QN AUTO: 26.1 PG (ref 27–31)
MCHC RBC AUTO-ENTMCNC: 31.1 G/DL (ref 33–36)
MCV RBC AUTO: 84 FL (ref 80–94)
MONOCYTES # BLD AUTO: 0.54 X10(3)/MCL (ref 0.1–1.3)
MONOCYTES NFR BLD AUTO: 10.4 %
NEUTROPHILS # BLD AUTO: 2.59 X10(3)/MCL (ref 2.1–9.2)
NEUTROPHILS NFR BLD AUTO: 50 %
NRBC BLD AUTO-RTO: 0 %
PHOSPHATE SERPL-MCNC: 2.6 MG/DL (ref 2.3–4.7)
PLATELET # BLD AUTO: 185 X10(3)/MCL (ref 130–400)
PMV BLD AUTO: 10 FL (ref 7.4–10.4)
POTASSIUM SERPL-SCNC: 2.9 MMOL/L (ref 3.5–5.1)
PROT SERPL-MCNC: 6.3 GM/DL (ref 5.8–7.6)
RBC # BLD AUTO: 4.18 X10(6)/MCL (ref 4.7–6.1)
SODIUM SERPL-SCNC: 145 MMOL/L (ref 136–145)
WBC # BLD AUTO: 5.18 X10(3)/MCL (ref 4.5–11.5)

## 2024-08-23 PROCEDURE — 84100 ASSAY OF PHOSPHORUS: CPT

## 2024-08-23 PROCEDURE — 63600175 PHARM REV CODE 636 W HCPCS: Performed by: NURSE PRACTITIONER

## 2024-08-23 PROCEDURE — 99900031 HC PATIENT EDUCATION (STAT)

## 2024-08-23 PROCEDURE — 25000003 PHARM REV CODE 250: Performed by: INTERNAL MEDICINE

## 2024-08-23 PROCEDURE — 27200966 HC CLOSED SUCTION SYSTEM

## 2024-08-23 PROCEDURE — 85025 COMPLETE CBC W/AUTO DIFF WBC: CPT

## 2024-08-23 PROCEDURE — 94003 VENT MGMT INPAT SUBQ DAY: CPT

## 2024-08-23 PROCEDURE — 63600175 PHARM REV CODE 636 W HCPCS

## 2024-08-23 PROCEDURE — 63600175 PHARM REV CODE 636 W HCPCS: Performed by: PHYSICIAN ASSISTANT

## 2024-08-23 PROCEDURE — 83735 ASSAY OF MAGNESIUM: CPT

## 2024-08-23 PROCEDURE — 99900035 HC TECH TIME PER 15 MIN (STAT)

## 2024-08-23 PROCEDURE — 80053 COMPREHEN METABOLIC PANEL: CPT

## 2024-08-23 PROCEDURE — 99900026 HC AIRWAY MAINTENANCE (STAT)

## 2024-08-23 PROCEDURE — 27100171 HC OXYGEN HIGH FLOW UP TO 24 HOURS

## 2024-08-23 PROCEDURE — 36415 COLL VENOUS BLD VENIPUNCTURE: CPT

## 2024-08-23 PROCEDURE — 63600175 PHARM REV CODE 636 W HCPCS: Performed by: INTERNAL MEDICINE

## 2024-08-23 PROCEDURE — 94760 N-INVAS EAR/PLS OXIMETRY 1: CPT

## 2024-08-23 RX ORDER — HYDROCODONE BITARTRATE AND ACETAMINOPHEN 5; 325 MG/1; MG/1
1 TABLET ORAL EVERY 6 HOURS PRN
Status: DISCONTINUED | OUTPATIENT
Start: 2024-08-23 | End: 2024-08-23 | Stop reason: HOSPADM

## 2024-08-23 RX ORDER — POTASSIUM CHLORIDE 14.9 MG/ML
60 INJECTION INTRAVENOUS ONCE
Status: DISCONTINUED | OUTPATIENT
Start: 2024-08-23 | End: 2024-08-23

## 2024-08-23 RX ORDER — PANTOPRAZOLE SODIUM 40 MG/10ML
40 INJECTION, POWDER, LYOPHILIZED, FOR SOLUTION INTRAVENOUS 2 TIMES DAILY
Start: 2024-08-23 | End: 2025-08-23

## 2024-08-23 RX ORDER — MICONAZOLE NITRATE 2 G/100G
POWDER TOPICAL 2 TIMES DAILY
Start: 2024-08-23

## 2024-08-23 RX ORDER — SUCRALFATE 1 G/1
1 TABLET ORAL
Start: 2024-08-23

## 2024-08-23 RX ORDER — FAMOTIDINE 20 MG/1
20 TABLET, FILM COATED ORAL 2 TIMES DAILY
Status: ON HOLD | COMMUNITY
End: 2024-08-23 | Stop reason: HOSPADM

## 2024-08-23 RX ORDER — POTASSIUM CHLORIDE 14.9 MG/ML
20 INJECTION INTRAVENOUS
Status: COMPLETED | OUTPATIENT
Start: 2024-08-23 | End: 2024-08-23

## 2024-08-23 RX ORDER — METOCLOPRAMIDE HYDROCHLORIDE 5 MG/5ML
10 SOLUTION ORAL
COMMUNITY

## 2024-08-23 RX ORDER — POLYETHYLENE GLYCOL 3350 17 G/17G
17 POWDER, FOR SOLUTION ORAL 2 TIMES DAILY PRN
Start: 2024-08-23

## 2024-08-23 RX ADMIN — POTASSIUM CHLORIDE 20 MEQ: 14.9 INJECTION, SOLUTION INTRAVENOUS at 04:08

## 2024-08-23 RX ADMIN — METOCLOPRAMIDE 5 MG: 5 INJECTION, SOLUTION INTRAMUSCULAR; INTRAVENOUS at 08:08

## 2024-08-23 RX ADMIN — PANTOPRAZOLE SODIUM 40 MG: 40 INJECTION, POWDER, LYOPHILIZED, FOR SOLUTION INTRAVENOUS at 08:08

## 2024-08-23 RX ADMIN — DILTIAZEM HYDROCHLORIDE 60 MG: 60 TABLET, FILM COATED ORAL at 11:08

## 2024-08-23 RX ADMIN — SUCRALFATE 1 G: 1 TABLET ORAL at 05:08

## 2024-08-23 RX ADMIN — FINASTERIDE 5 MG: 5 TABLET, FILM COATED ORAL at 08:08

## 2024-08-23 RX ADMIN — SUCRALFATE 1 G: 1 TABLET ORAL at 11:08

## 2024-08-23 RX ADMIN — VENLAFAXINE 75 MG: 37.5 TABLET ORAL at 08:08

## 2024-08-23 RX ADMIN — POTASSIUM CHLORIDE 20 MEQ: 14.9 INJECTION, SOLUTION INTRAVENOUS at 08:08

## 2024-08-23 RX ADMIN — MICONAZOLE NITRATE 2 % TOPICAL POWDER: at 08:08

## 2024-08-23 RX ADMIN — LEVETIRACETAM 1500 MG: 100 SOLUTION ORAL at 08:08

## 2024-08-23 RX ADMIN — Medication: at 08:08

## 2024-08-23 RX ADMIN — DILTIAZEM HYDROCHLORIDE 60 MG: 60 TABLET, FILM COATED ORAL at 05:08

## 2024-08-23 RX ADMIN — METOPROLOL TARTRATE 100 MG: 50 TABLET, FILM COATED ORAL at 08:08

## 2024-08-23 RX ADMIN — CHOLESTYRAMINE 4 G: 4 POWDER, FOR SUSPENSION ORAL at 08:08

## 2024-08-23 RX ADMIN — PIPERACILLIN SODIUM AND TAZOBACTAM SODIUM 4.5 G: 4; .5 INJECTION, POWDER, LYOPHILIZED, FOR SOLUTION INTRAVENOUS at 11:08

## 2024-08-23 RX ADMIN — BUSPIRONE HYDROCHLORIDE 5 MG: 5 TABLET ORAL at 08:08

## 2024-08-23 RX ADMIN — PIPERACILLIN SODIUM AND TAZOBACTAM SODIUM 4.5 G: 4; .5 INJECTION, POWDER, LYOPHILIZED, FOR SOLUTION INTRAVENOUS at 02:08

## 2024-08-23 RX ADMIN — MUPIROCIN: 20 OINTMENT TOPICAL at 08:08

## 2024-08-23 RX ADMIN — QUETIAPINE FUMARATE 25 MG: 25 TABLET ORAL at 08:08

## 2024-08-23 NOTE — PROGRESS NOTES
Inpatient Nutrition Assessment    Admit Date: 8/19/2024   Total duration of encounter: 4 days   Patient Age: 69 y.o.    Nutrition Recommendation/Prescription     Tube feeding as tolerated:  Impact Peptide 1.5 goal rate 75 ml/hr to provide (calculations based on estimated 20 hr/d run time)   2250 kcal, 109% needs  141 g protein, 100% needs  1155 ml free water, will require additional fluid source, can be given as 45 ml/hr water flush once tube feeding tolerated at goal    Communication of Recommendations: reviewed with nurse    Nutrition Assessment     Malnutrition Assessment/Nutrition-Focused Physical Exam    Malnutrition Context: chronic illness (08/19/24 1221)  Malnutrition Level: other (see comments) (does not meet criteria) (08/19/24 1221)  Energy Intake (Malnutrition): other (see comments) (unable to evaluate) (08/19/24 1221)  Weight Loss (Malnutrition): other (see comments) (unable to evaluate) (08/19/24 1221)  Subcutaneous Fat (Malnutrition): other (see comments) (does not meet criteria) (08/19/24 1221)           Muscle Mass (Malnutrition): other (see comments) (does not meet criteria) (08/19/24 1221)                          Fluid Accumulation (Malnutrition): other (see comments) (does not meet criteria) (08/19/24 1221)  Hand  Strength, Left (Malnutrition): unable to evaluate (08/19/24 1221)  Hand  Strength, Right (Malnutrition): unable to evaluate (08/19/24 1221)  A minimum of two characteristics is recommended for diagnosis of either severe or non-severe malnutrition.    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, [facility] CVA 12/2023 now trach/PEG dependent     Scheduled Medications:  busPIRone, 5 mg, TID  cholestyramine, 1 packet, Daily  diltiaZEM, 60 mg, Q6H  finasteride, 5 mg, Daily  levetiracetam, 1,500 mg, BID  metoclopramide, 5 mg, BID  metoprolol tartrate, 100 mg, BID  miconazole NITRATE 2 %, , BID  mupirocin, , BID  pantoprazole, 40 mg, BID  piperacillin-tazobactam (Zosyn) IV (PEDS and ADULTS) (extended infusion is not appropriate), 4.5 g, Q8H  QUEtiapine, 25 mg, BID  scopolamine, 1 patch, Q3 Days  sucralfate, 1 g, QID (AC & HS)  tamsulosin, 0.4 mg, QHS  vancomycin (VANCOCIN) IV (PEDS and ADULTS), 1,500 mg, Q18H  venlafaxine, 75 mg, Daily  zinc oxide-cod liver oil, , BID    Continuous Infusions:  NORepinephrine bitartrate-D5W, Last Rate: Stopped (08/19/24 3296)    PRN Medications:  acetaminophen, 650 mg, Q6H PRN  aluminum-magnesium hydroxide-simethicone, 30 mL, QID PRN  HYDROcodone-acetaminophen, 1 tablet, Q6H PRN  melatonin, 6 mg, Nightly PRN  naloxone, 0.02 mg, PRN  ondansetron, 4 mg, Q4H PRN  polyethylene glycol, 17 g, BID PRN  prochlorperazine, 5 mg, Q6H PRN  simethicone, 1 tablet, QID PRN  vancomycin - pharmacy to dose, , pharmacy to manage frequency    Calorie Containing IV Medications: no significant kcals from medications at this time    Recent Labs   Lab 08/19/24  0204 08/19/24  2344 08/20/24  0456 08/20/24  0457 08/21/24  0251 08/22/24  0329 08/23/24  0314    141 142  --  142 143 145   K 3.7 3.8 4.1  --  2.9* 3.1* 2.9*   CALCIUM 9.3 8.5* 9.0  --  8.4* 8.3* 8.3*   PHOS  --  4.5 2.6  --  1.7* 3.5 2.6   MG 2.00 2.00 1.90  --  2.10 2.00 2.00   CO2 27 22* 28  --  29 28 27   BUN 19.3 11.4 10.6  --  8.7 6.7* 9.2   CREATININE 0.88 0.80 0.79  --  0.75 0.65* 0.69*   EGFRNORACEVR >60 >60 >60  --  >60 >60 >60   GLUCOSE 92 137* 122*  --  100 110 72*   BILITOT 1.2 0.9 1.1  --  1.4 0.8 0.7   ALKPHOS 152* 124 116  --  110  "107 103   ALT 26 19 19  --  17 17 15   AST 31 23 20  --  19 18 15   ALBUMIN 3.6 3.0* 2.9*  --  2.9* 2.8* 2.8*   WBC 16.45* 19.53*  --  15.08* 7.73 6.12 5.18   HGB 14.6 13.0*  --  12.4* 11.6* 11.4* 10.9*   HCT 45.9 45.3  --  40.8* 35.9* 36.7* 35.1*     Nutrition Orders:  Diet NPO  Tube Feedings/Formulas 75; 1,500; Impact Peptide 1.5; Gastrostomy (goal water flushes: 45 ml/hr); 30; Every 4 hours    Appetite/Oral Intake: NPO/not applicable  Factors Affecting Nutritional Intake: difficulty/impaired swallowing, NPO, tracheostomy, and vomiting  Social Needs Impacting Access to Food: unable to assess at this time; will attempt on follow-up  Food/Spiritism/Cultural Preferences: unable to obtain  Food Allergies: no known food allergies  Last Bowel Movement: 08/23/24  Wound(s):     Wound 08/05/24 1420 Pressure Injury Left Knee-Tissue loss description: Partial thickness    Comments    8/19/24: Consult for TF recs; noted per previous admission, pt normally is on Impact Peptide 1.5 @ 80mL/hr; can order Pivot 1.5 (substitute for Impact 1.5). Unable to verify usual wt with pt; weights fluctuate too much in the last several months.    8/21/24  Nurse reports patient transferred to ICU after vomiting/aspiration, now on ventilator per tracheostomy. Nurse reports tube feeding advanced to goal rate (80 ml/hr) yesterday and had large emesis overnight, tube feeding currently held with plans to start motility agent and resume at low rate. Current order is for 200 ml water flush every 4 hours, may do better with smaller hourly water flushes.     8/23/24 Patient had tube feeding at goal, vomited overnight, resumed at 10 ml/hr, nurse attributes vomiting possibly to coughing fits with tracheostomy, plans for discharge to nursing home today.    Anthropometrics    Height: 5' 10" (177.8 cm), Height Method: Stated  Last Weight: 90.7 kg (199 lb 15.3 oz) (08/19/24 2330), Weight Method: Standard Scale  BMI (Calculated): 28.7  BMI Classification: " overweight (BMI 25-29.9)        Ideal Body Weight (IBW), Male: 166 lb     % Ideal Body Weight, Male (lb): 120.46 %                          Usual Weight Provided By: EMR weight history    Wt Readings from Last 5 Encounters:   24 90.7 kg (199 lb 15.3 oz)   24 117.9 kg (260 lb)   24 117.9 kg (260 lb)   24 90.7 kg (200 lb)   05/15/24 90.7 kg (200 lb)     Weight Change(s) Since Admission:   Wt Readings from Last 1 Encounters:   24 2330 90.7 kg (199 lb 15.3 oz)   24 010 90.7 kg (200 lb)   Admit Weight: 90.7 kg (200 lb) (24 0106), Weight Method: Estimated    Estimated Needs    Weight Used For Calorie Calculations: 90.7 kg (199 lb 15.3 oz)  Energy Calorie Requirements (kcal): 2,053.88  Energy Need Method: [place]  Total Ve: 10.3 L/m, Temp (24hrs), Av.4 °F (37.4 °C), Min:98.9 °F (37.2 °C), Max:99.7 °F (37.6 °C)  Vtot (L/Min) for [place] Equation Calculation: 11.9; Max Temp for [place] Equation Calculation: 99.7 °F  Weight Used For Protein Calculations: 90.7 kg (199 lb 15.3 oz)  Protein Requirements: 136-155 g, 1.5-1.7 g/kg  Fluid Requirements (mL): 2,053.88, 1 ml/kcal     Last Updated:      Enteral Nutrition     Formula: Impact Peptide 1.5 [place]  Rate/Volume: 10 ml/hr  Water Flushes: 10 ml every 4 hours  Additives/Modulars: none at this time  Route: gastrostomy tube  Method: continuous  Total Nutrition Provided by Tube Feeding, Additives, and Flushes:  Calories Provided  300 kcal/d, 15% needs   Protein Provided  19 g/d, 14% needs   Fluid Provided  204 ml/d, 10% needs   Continuous feeding calculations based on estimated 20 hr/d run time unless otherwise stated.    Parenteral Nutrition     Patient not receiving parenteral nutrition support at this time.    Evaluation of Received Nutrient Intake    Calories: not meeting estimated needs  Protein: not meeting estimated needs    Patient Education     Not applicable.    Nutrition Diagnosis     PES: Inadequate energy intake  related to acute illness as evidenced by NPO with no nutrition support since admit. (active)     Nutrition Interventions     Intervention(s): modified rate of enteral nutrition and collaboration with other providers    Goal: Meet greater than 80% of nutritional needs by follow-up. (goal not met)  Goal: Maintain weight throughout hospitalization. (goal progressing)    Nutrition Goals & Monitoring     Dietitian will monitor: energy intake, enteral nutrition intake, and weight  Discharge planning: tube feeding (current order)  Nutrition Risk/Follow-Up: high (follow-up in 1-4 days)   Please consult if re-assessment needed sooner.

## 2024-08-23 NOTE — DISCHARGE SUMMARY
[facility] - 7th Floor ICU  Pulmonology  Discharge Summary      Patient Name: Delio Daniel Jr.  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 Pt resides in nursing home. Presented to ED from NH 8/19/24 due to concern for coffee ground emesis from mouth/trach. On initial presentation, he was found to be febrile to 101.4F, tachycardic, hypotensive, saturating 98% on 4L trach collar. labs with leukocytosis of 16,000, H/H 14.6/45.9, INR 1.4, .1, troponin 0.035, and lactic acid 2.6. EKG revealed atrial fibrillation with RVR and heart rate of 125. CT chest/abdomen/pelvis with IV contrast demonstrated bilateral lung infiltrates, which may represent developing infectious process, normal esophagus and stomach. In ED, emesis tested + for occult blood. Given Vanc, Zosyn, IV Protonix, IV Digoxin 250mcg. Admitted to Hospital Medicine. GI consulted; given Hgb normal, no plans for endoscopy. Recommended continued PPI and slow tube feeds.     While on floor, nursing staff noted change in mentation with patient becoming more obtunded. Rapid response called. ABG revealed pH 7.09, pCO2 >115. Pt placed on mechanical ventilation and upgraded to ICU on 8/19    Interval History:  -attempts to place on CPAP demonstrates apnea  -sputum culture from 08/20 with Gram-negative rods; currently on Vanc and Zosyn; MRSA PCR positive.   -no plans for endoscopy per GI due to hematemesis no clear findings on imaging to warrant  further workup or endoscopy     He has been accepted to [facility]; will need to continue antibiotics. Will resume Xarelto today. Continue protonix    * No surgery found *    Indwelling Lines/Drains at Time of Discharge:   Lines/Drains/Airways       Drain  Duration                  Gastrostomy/Enterostomy 01/02/24 1230  days         Urethral Catheter 08/19/24 1951 3 days              Airway  Duration             Adult Surgical Airway 08/19/24 0120 Shiley Extra Large Cuffed Distal 6.0/ 75mm 4 days                    Hospital Course:  No notes on file    Goals of Care Treatment Preferences:  Code Status: Full Code       La[place]: Yes           Consults (From admission, onward)          Status Ordering Provider     Inpatient consult to Gastroenterology  Once        Provider:  Johnathon Gramajo MD    Acknowledged PHILLIP CALIX     Inpatient consult to Gastroenterology  Once        Provider:  Johnathon Gramajo MD    Completed WADE DAVIS     Pharmacy to dose Vancomycin consult  Once        Provider:  (Not yet assigned)    Acknowledged PRIYANK PAINTER     Inpatient consult to Registered Dietitian/Nutritionist  Once        Provider:  (Not yet assigned)    Completed PRIYANK PAINTER            Significant Labs:  All pertinent labs within the past 24 hours have been reviewed.    Significant Imaging:  I have reviewed all pertinent imaging results/findings within the past 24 hours.    Pending Diagnostic Studies:       None          Final Active Diagnoses:    Diagnosis Date Noted POA    PRINCIPAL PROBLEM:  Pneumonia due to infectious organism [J18.9] 08/23/2024 Yes      Problems Resolved During this Admission:       Discharged Condition: stable    Disposition:     Follow Up:    Patient Instructions:   No discharge procedures on file.  Medications:  Reconciled Home Medications:      Medication List        START taking these medications      D5W PgBk 100 mL with piperacillin-tazobactam 4.5 gram SolR  4.5 g  Inject 4.5 g into the vein every 8 (eight) hours. for 3 days     miconazole NITRATE 2 % 2 % top powder  Commonly known as: MICOTIN  Apply topically 2 (two) times daily.     pantoprazole 40 mg injection  Commonly known as: PROTONIX  Inject 40 mg into the vein 2 (two) times daily.     polyethylene glycol 17 gram Pwpk  Commonly known as: GLYCOLAX  Take 17 g by mouth 2 (two) times daily as needed for Constipation.     sucralfate 1 gram tablet  Commonly known as: CARAFATE  1 tablet (1 g total) by Per G Tube route 4 (four) times daily before meals and nightly.            CONTINUE taking these medications      ascorbic Acid 500 mg Cpsr  Commonly known as: VITAMIN C  500 mg 2 (two) times daily. Per PEG tube     busPIRone 5 MG Tab  Commonly known as: BUSPAR  5 mg by Per G Tube route 3 (three) times daily.     cholestyramine 4 gram packet  Commonly known as: QUESTRAN  4 g once daily. Via PEG tube     cholestyramine-aspartame 4 grams of anhydrous Pwpk  Commonly known as: QUESTRAN LIGHT  1 packet (4 g total) by Per G Tube route 2 (two) times daily.     diltiaZEM 60 MG tablet  Commonly known as: CARDIZEM  60 mg by Per G Tube route every 6 (six) hours.     ferrous sulfate 300 mg (60 mg iron)/5 mL syrup  Take 5 mLs (300 mg total) by mouth once daily.     finasteride 5 mg tablet  Commonly known as: PROSCAR  Take 1 tablet (5 mg total) by mouth once daily.     FLORANEX ORAL  1 packet by PEG Tube route Daily.     furosemide 80 MG tablet  Commonly known as: LASIX  80 mg by Per G Tube route as needed (for Edema).     levetiracetam 500 mg/5 mL (5 mL) Soln  1,500 mg by Per G Tube route 2 (two) times daily. Nursing home reports medication hold by MD until level redraw on Monday.     LIPITOR 10 mg tablet  Generic drug: atorvastatin  10 mg by Per G Tube route once daily.     metoclopramide HCl 5 mg/5 mL Soln  Commonly known as: REGLAN  10 mg by Per G Tube route 3 (three) times daily before meals.     metoprolol tartrate 100 MG  tablet  Commonly known as: LOPRESSOR  100 mg by Per G Tube route 2 (two) times daily.     multivitamin per tablet  Commonly known as: THERAGRAN  1 tablet by Per G Tube route once daily.     PROMOD PROTEIN ORAL  30 mLs by Per G Tube route once daily.     QUEtiapine 25 MG Tab  Commonly known as: SEROQUEL  25 mg by Per G Tube route 2 (two) times daily.     scopolamine 1.3-1.5 mg (1 mg over 3 days)  Commonly known as: TRANSDERM-SCOP  Place 1 patch onto the skin Every 3 (three) days.     tamsulosin 0.4 mg Cap  Commonly known as: FLOMAX  Take 1 capsule (0.4 mg total) by mouth every evening.     venlafaxine 75 mg Tr24  1 tablet by Per G Tube route 2 (two) times a day.     XARELTO 20 mg Tab  Generic drug: rivaroxaban  1 tablet by Per G Tube route nightly.            STOP taking these medications      famotidine 20 MG tablet  Commonly known as: PEPCID     LANSOPRAZOLE 3 MG/ML SUSP  Commonly known as: PREVACID              TRUMAN Rae  Pulmonology  [facility] - 7th Floor ICU

## 2024-08-23 NOTE — PLAN OF CARE
08/23/24 1236   Final Note   Assessment Type Final Discharge Note   Anticipated Discharge Disposition [facility]   Post-Acute Status   Post-Acute Authorization Placement   Post-Acute Placement Status Set-up Complete/Auth obtained   Discharge Delays None known at this time     Patient discharged to [facility]. Odalys WESTBROOK gave nurse travel packet and placed patient into [facility] transport. D/C summary, MAR, AVS sent via [facility] to [facility]. Notified daughter of discharge today.

## 2024-08-23 NOTE — NURSING
Ms Zamora (nurse from [facility]) was given all the report about the patient, all the detailed information was given to the nurse and the director of nursing about the patient,  All lines were intact and informed, The [facility] ambulance were here to pick him up in a stretcher.The patient  and his belongings leave with the [facility] members. The family was informed about the transfer. The nurse was called.

## 2024-08-23 NOTE — PLAN OF CARE
[facility]. I called daughter, Tony obb and she said her sister is going to sign paperwork today. Notified Dr. Salcido [facility] can take him today and sent last resp culture to NH via [facility].

## 2024-08-23 NOTE — NURSING
Nurses Note -- 4 Eyes      8/23/2024   5:28 AM      Skin assessed during: Daily Assessment      [] No Altered Skin Integrity Present    [x]Prevention Measures Documented      [x] Yes- Altered Skin Integrity Present or Discovered   [] LDA Added if Not in Epic (Describe Wound)   [] New Altered Skin Integrity was Present on Admit and Documented in LDA   [] Wound Image Taken    Wound Care Consulted? Yes    Attending Nurse:  Marianna Johnson RN/Staff Member:  COLIN Horne

## 2024-08-23 NOTE — PLAN OF CARE
Problem: Adult Inpatient Plan of Care  Goal: Optimal Comfort and Wellbeing  Outcome: Progressing     Problem: Sepsis/Septic Shock  Goal: Absence of Bleeding  Outcome: Progressing  Goal: Optimal Nutrition Intake  Outcome: Progressing

## 2024-08-23 NOTE — NURSING
Nurses Note -- 4 Eyes      8/23/2024   11:10 AM      Skin assessed during: Daily Assessment      [] No Altered Skin Integrity Present    [x]Prevention Measures Documented      [x] Yes- Altered Skin Integrity Present or Discovered   [] LDA Added if Not in Epic (Describe Wound)   [] New Altered Skin Integrity was Present on Admit and Documented in LDA   [] Wound Image Taken    Wound Care Consulted? Yes    Attending Nurse:  Taylor Johnson RN/Staff Member:   MÓNICA Barry

## 2024-08-24 LAB
BACTERIA BLD CULT: NORMAL
BACTERIA BLD CULT: NORMAL

## 2024-08-26 ENCOUNTER — LAB REQUISITION (OUTPATIENT)
Dept: LAB | Facility: HOSPITAL | Age: 69
End: 2024-08-26
Payer: MEDICARE

## 2024-08-26 DIAGNOSIS — Z01.812 ENCOUNTER FOR PREPROCEDURAL LABORATORY EXAMINATION: ICD-10-CM

## 2024-08-26 LAB
25(OH)D3+25(OH)D2 SERPL-MCNC: 21 NG/ML (ref 30–80)
ALBUMIN SERPL-MCNC: 2.9 G/DL (ref 3.4–4.8)
ALBUMIN/GLOB SERPL: 0.7 RATIO (ref 1.1–2)
ALP SERPL-CCNC: 120 UNIT/L (ref 40–150)
ALT SERPL-CCNC: 21 UNIT/L (ref 0–55)
ANION GAP SERPL CALC-SCNC: 13 MEQ/L
AST SERPL-CCNC: 17 UNIT/L (ref 5–34)
BACTERIA SPT CULT: ABNORMAL
BACTERIA SPT CULT: ABNORMAL
BASOPHILS # BLD AUTO: 0.03 X10(3)/MCL
BASOPHILS NFR BLD AUTO: 0.6 %
BILIRUB SERPL-MCNC: 0.9 MG/DL
BUN SERPL-MCNC: 9.2 MG/DL (ref 8.4–25.7)
CALCIUM SERPL-MCNC: 8.9 MG/DL (ref 8.8–10)
CHLORIDE SERPL-SCNC: 111 MMOL/L (ref 98–107)
CHOLEST SERPL-MCNC: 74 MG/DL
CHOLEST/HDLC SERPL: 3 {RATIO} (ref 0–5)
CO2 SERPL-SCNC: 20 MMOL/L (ref 23–31)
CREAT SERPL-MCNC: 0.8 MG/DL (ref 0.73–1.18)
CREAT/UREA NIT SERPL: 12
EOSINOPHIL # BLD AUTO: 0.32 X10(3)/MCL (ref 0–0.9)
EOSINOPHIL NFR BLD AUTO: 6 %
ERYTHROCYTE [DISTWIDTH] IN BLOOD BY AUTOMATED COUNT: 15.6 % (ref 11.5–17)
EST. AVERAGE GLUCOSE BLD GHB EST-MCNC: 105.4 MG/DL
FERRITIN SERPL-MCNC: 217.43 NG/ML (ref 21.81–274.66)
GFR SERPLBLD CREATININE-BSD FMLA CKD-EPI: >60 ML/MIN/1.73/M2
GLOBULIN SER-MCNC: 4 GM/DL (ref 2.4–3.5)
GLUCOSE SERPL-MCNC: 85 MG/DL (ref 82–115)
GRAM STN SPEC: ABNORMAL
HBA1C MFR BLD: 5.3 %
HCT VFR BLD AUTO: 37.1 % (ref 42–52)
HDLC SERPL-MCNC: 23 MG/DL (ref 35–60)
HGB BLD-MCNC: 12.1 G/DL (ref 14–18)
IMM GRANULOCYTES # BLD AUTO: 0.02 X10(3)/MCL (ref 0–0.04)
IMM GRANULOCYTES NFR BLD AUTO: 0.4 %
IRON SATN MFR SERPL: 13 % (ref 20–50)
IRON SERPL-MCNC: 27 UG/DL (ref 65–175)
LDLC SERPL CALC-MCNC: 35 MG/DL (ref 50–140)
LYMPHOCYTES # BLD AUTO: 1.27 X10(3)/MCL (ref 0.6–4.6)
LYMPHOCYTES NFR BLD AUTO: 24 %
MCH RBC QN AUTO: 26 PG (ref 27–31)
MCHC RBC AUTO-ENTMCNC: 32.6 G/DL (ref 33–36)
MCV RBC AUTO: 79.6 FL (ref 80–94)
MONOCYTES # BLD AUTO: 0.42 X10(3)/MCL (ref 0.1–1.3)
MONOCYTES NFR BLD AUTO: 7.9 %
NEUTROPHILS # BLD AUTO: 3.23 X10(3)/MCL (ref 2.1–9.2)
NEUTROPHILS NFR BLD AUTO: 61.1 %
NRBC BLD AUTO-RTO: 0 %
PLATELET # BLD AUTO: 284 X10(3)/MCL (ref 130–400)
PMV BLD AUTO: 10.6 FL (ref 7.4–10.4)
POTASSIUM SERPL-SCNC: 2.1 MMOL/L (ref 3.5–5.1)
PREALB SERPL-MCNC: 14.4 MG/DL (ref 16–42)
PROT SERPL-MCNC: 6.9 GM/DL (ref 5.8–7.6)
RBC # BLD AUTO: 4.66 X10(6)/MCL (ref 4.7–6.1)
SODIUM SERPL-SCNC: 144 MMOL/L (ref 136–145)
TIBC SERPL-MCNC: 185 UG/DL (ref 69–240)
TIBC SERPL-MCNC: 212 UG/DL (ref 250–450)
TRANSFERRIN SERPL-MCNC: 194 MG/DL (ref 163–344)
TRIGL SERPL-MCNC: 81 MG/DL (ref 34–140)
TSH SERPL-ACNC: 2.59 UIU/ML (ref 0.35–4.94)
VLDLC SERPL CALC-MCNC: 16 MG/DL
WBC # BLD AUTO: 5.29 X10(3)/MCL (ref 4.5–11.5)

## 2024-08-26 PROCEDURE — 80177 DRUG SCRN QUAN LEVETIRACETAM: CPT | Performed by: NURSE PRACTITIONER

## 2024-08-26 PROCEDURE — 84134 ASSAY OF PREALBUMIN: CPT | Performed by: NURSE PRACTITIONER

## 2024-08-26 PROCEDURE — 84443 ASSAY THYROID STIM HORMONE: CPT | Performed by: NURSE PRACTITIONER

## 2024-08-26 PROCEDURE — 80053 COMPREHEN METABOLIC PANEL: CPT | Performed by: NURSE PRACTITIONER

## 2024-08-26 PROCEDURE — 82728 ASSAY OF FERRITIN: CPT | Performed by: NURSE PRACTITIONER

## 2024-08-26 PROCEDURE — 83036 HEMOGLOBIN GLYCOSYLATED A1C: CPT | Performed by: NURSE PRACTITIONER

## 2024-08-26 PROCEDURE — 85025 COMPLETE CBC W/AUTO DIFF WBC: CPT | Performed by: NURSE PRACTITIONER

## 2024-08-26 PROCEDURE — 83540 ASSAY OF IRON: CPT | Performed by: NURSE PRACTITIONER

## 2024-08-26 PROCEDURE — 82306 VITAMIN D 25 HYDROXY: CPT | Performed by: NURSE PRACTITIONER

## 2024-08-26 PROCEDURE — 80061 LIPID PANEL: CPT | Performed by: NURSE PRACTITIONER

## 2024-08-27 ENCOUNTER — TELEPHONE (OUTPATIENT)
Dept: HEMATOLOGY/ONCOLOGY | Facility: CLINIC | Age: 69
End: 2024-08-27
Payer: MEDICARE

## 2024-08-27 DIAGNOSIS — C7A.8 NEUROENDOCRINE CARCINOMA OF SMALL BOWEL: Primary | ICD-10-CM

## 2024-08-27 LAB — LEVETIRACETAM SERPL-MCNC: 47 MCG/ML (ref 10–40)

## 2024-08-28 ENCOUNTER — TELEPHONE (OUTPATIENT)
Dept: HEMATOLOGY/ONCOLOGY | Facility: CLINIC | Age: 69
End: 2024-08-28

## 2024-08-28 ENCOUNTER — OFFICE VISIT (OUTPATIENT)
Dept: HEMATOLOGY/ONCOLOGY | Facility: CLINIC | Age: 69
End: 2024-08-28
Attending: INTERNAL MEDICINE
Payer: MEDICARE

## 2024-08-28 ENCOUNTER — LAB VISIT (OUTPATIENT)
Dept: HEMATOLOGY/ONCOLOGY | Facility: CLINIC | Age: 69
End: 2024-08-28
Payer: MEDICARE

## 2024-08-28 VITALS
RESPIRATION RATE: 20 BRPM | HEIGHT: 70 IN | HEART RATE: 66 BPM | DIASTOLIC BLOOD PRESSURE: 89 MMHG | SYSTOLIC BLOOD PRESSURE: 136 MMHG | BODY MASS INDEX: 28.69 KG/M2 | OXYGEN SATURATION: 98 % | TEMPERATURE: 98 F

## 2024-08-28 DIAGNOSIS — C7A.8 PRIMARY NEUROENDOCRINE CARCINOMA OF COLON: ICD-10-CM

## 2024-08-28 DIAGNOSIS — C7A.8 NEUROENDOCRINE CARCINOMA OF SMALL BOWEL: Primary | ICD-10-CM

## 2024-08-28 DIAGNOSIS — I49.01 CARDIAC ARREST WITH VENTRICULAR FIBRILLATION: ICD-10-CM

## 2024-08-28 DIAGNOSIS — C7A.8 NEUROENDOCRINE CARCINOMA OF SMALL BOWEL: ICD-10-CM

## 2024-08-28 DIAGNOSIS — I46.9 CARDIAC ARREST WITH VENTRICULAR FIBRILLATION: ICD-10-CM

## 2024-08-28 DIAGNOSIS — I48.91 ATRIAL FIBRILLATION WITH RAPID VENTRICULAR RESPONSE: ICD-10-CM

## 2024-08-28 LAB
ALBUMIN SERPL-MCNC: 3.1 G/DL (ref 3.4–4.8)
ALBUMIN/GLOB SERPL: 0.7 RATIO (ref 1.1–2)
ALP SERPL-CCNC: 145 UNIT/L (ref 40–150)
ALT SERPL-CCNC: 19 UNIT/L (ref 0–55)
ANION GAP SERPL CALC-SCNC: 9 MEQ/L
AST SERPL-CCNC: 20 UNIT/L (ref 5–34)
BASOPHILS # BLD AUTO: 0.05 X10(3)/MCL
BASOPHILS NFR BLD AUTO: 1 %
BILIRUB SERPL-MCNC: 0.7 MG/DL
BUN SERPL-MCNC: 8.6 MG/DL (ref 8.4–25.7)
CALCIUM SERPL-MCNC: 9.2 MG/DL (ref 8.8–10)
CHLORIDE SERPL-SCNC: 114 MMOL/L (ref 98–107)
CO2 SERPL-SCNC: 23 MMOL/L (ref 23–31)
CREAT SERPL-MCNC: 0.76 MG/DL (ref 0.73–1.18)
CREAT/UREA NIT SERPL: 11
EOSINOPHIL # BLD AUTO: 0.37 X10(3)/MCL (ref 0–0.9)
EOSINOPHIL NFR BLD AUTO: 7.6 %
ERYTHROCYTE [DISTWIDTH] IN BLOOD BY AUTOMATED COUNT: 15.8 % (ref 11.5–17)
GFR SERPLBLD CREATININE-BSD FMLA CKD-EPI: >60 ML/MIN/1.73/M2
GLOBULIN SER-MCNC: 4.2 GM/DL (ref 2.4–3.5)
GLUCOSE SERPL-MCNC: 87 MG/DL (ref 82–115)
HCT VFR BLD AUTO: 38.2 % (ref 42–52)
HGB BLD-MCNC: 12.1 G/DL (ref 14–18)
IMM GRANULOCYTES # BLD AUTO: 0.03 X10(3)/MCL (ref 0–0.04)
IMM GRANULOCYTES NFR BLD AUTO: 0.6 %
LYMPHOCYTES # BLD AUTO: 1.73 X10(3)/MCL (ref 0.6–4.6)
LYMPHOCYTES NFR BLD AUTO: 35.7 %
MCH RBC QN AUTO: 25.8 PG (ref 27–31)
MCHC RBC AUTO-ENTMCNC: 31.7 G/DL (ref 33–36)
MCV RBC AUTO: 81.4 FL (ref 80–94)
MONOCYTES # BLD AUTO: 0.7 X10(3)/MCL (ref 0.1–1.3)
MONOCYTES NFR BLD AUTO: 14.5 %
NEUTROPHILS # BLD AUTO: 1.96 X10(3)/MCL (ref 2.1–9.2)
NEUTROPHILS NFR BLD AUTO: 40.6 %
NRBC BLD AUTO-RTO: 0 %
PLATELET # BLD AUTO: 347 X10(3)/MCL (ref 130–400)
PMV BLD AUTO: 10.1 FL (ref 7.4–10.4)
POTASSIUM SERPL-SCNC: 2.8 MMOL/L (ref 3.5–5.1)
PROT SERPL-MCNC: 7.3 GM/DL (ref 5.8–7.6)
RBC # BLD AUTO: 4.69 X10(6)/MCL (ref 4.7–6.1)
SODIUM SERPL-SCNC: 146 MMOL/L (ref 136–145)
WBC # BLD AUTO: 4.84 X10(3)/MCL (ref 4.5–11.5)

## 2024-08-28 PROCEDURE — 3044F HG A1C LEVEL LT 7.0%: CPT | Mod: CPTII,,, | Performed by: INTERNAL MEDICINE

## 2024-08-28 PROCEDURE — 99214 OFFICE O/P EST MOD 30 MIN: CPT | Mod: PBBFAC | Performed by: INTERNAL MEDICINE

## 2024-08-28 PROCEDURE — 1111F DSCHRG MED/CURRENT MED MERGE: CPT | Mod: CPTII,,, | Performed by: INTERNAL MEDICINE

## 2024-08-28 PROCEDURE — 99213 OFFICE O/P EST LOW 20 MIN: CPT | Mod: S$PBB,,, | Performed by: INTERNAL MEDICINE

## 2024-08-28 PROCEDURE — 3008F BODY MASS INDEX DOCD: CPT | Mod: CPTII,,, | Performed by: INTERNAL MEDICINE

## 2024-08-28 PROCEDURE — 1159F MED LIST DOCD IN RCRD: CPT | Mod: CPTII,,, | Performed by: INTERNAL MEDICINE

## 2024-08-28 PROCEDURE — 1157F ADVNC CARE PLAN IN RCRD: CPT | Mod: CPTII,,, | Performed by: INTERNAL MEDICINE

## 2024-08-28 PROCEDURE — 36415 COLL VENOUS BLD VENIPUNCTURE: CPT

## 2024-08-28 PROCEDURE — 80053 COMPREHEN METABOLIC PANEL: CPT

## 2024-08-28 PROCEDURE — 4010F ACE/ARB THERAPY RXD/TAKEN: CPT | Mod: CPTII,,, | Performed by: INTERNAL MEDICINE

## 2024-08-28 PROCEDURE — 85025 COMPLETE CBC W/AUTO DIFF WBC: CPT

## 2024-08-28 PROCEDURE — 1160F RVW MEDS BY RX/DR IN RCRD: CPT | Mod: CPTII,,, | Performed by: INTERNAL MEDICINE

## 2024-08-28 PROCEDURE — 3079F DIAST BP 80-89 MM HG: CPT | Mod: CPTII,,, | Performed by: INTERNAL MEDICINE

## 2024-08-28 PROCEDURE — 3075F SYST BP GE 130 - 139MM HG: CPT | Mod: CPTII,,, | Performed by: INTERNAL MEDICINE

## 2024-08-28 NOTE — TELEPHONE ENCOUNTER
patient came to appointment via ambulance transportation. called patients sister david to inform scans showed no signs of cancer and would follow up with Dr. Reed back in a year with repeat scans. sister david stated she would notify patients children. patient on the way via ambulance back to [facility].

## 2024-08-28 NOTE — PROGRESS NOTES
History:  Past Medical History:   Diagnosis Date    Arthritis     Atrial fibrillation     BPH (benign prostatic hyperplasia)     Cardiac arrest     Coronary artery disease     Cyst, kidney, acquired     Diverticulosis     Hyperlipidemia     Hypertension     MI (myocardial infarction)     Obesity     Steatosis of liver     Stroke    Past medical history: Atrial fibrillation.  Coronary artery disease.  History of cardiac arrest with ventricular fibrillation.  Hypertension.  Dyslipidemia.  MI in 2003.  Obesity.  Status post CPR 03/08/2018. 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 HFpEF (EF >55% on echo 11/2018).  History of second-degree AV block requiring PPM.  Social history: Single.  Has 9 children.  Lives in [place].  Used to work as a  at [employer].  Smoked up to 5 packs of cigarettes daily for 10-20 years; quit in 1970s.  Used to drink vodka every weekend until he got drunk; drank for about 35 years, then quit.  Used to smoke marijuana.  Family history: Negative for malignancy.  Health maintenance: He is unsure when he had last colonoscopy performed, probably 10 years back, at [facility], apparently unremarkable.   Past Surgical History:   Procedure Laterality Date    A-V CARDIAC PACEMAKER INSERTION Right     CARDIAC CATHETERIZATION      COLONOSCOPY W/ BIOPSIES      CRANIECTOMY Right 12/20/2023    Procedure: CRANIECTOMY;  Surgeon: Artem Can MD;  Location: [facility];  Service: Neurosurgery;  Laterality: Right;    ESOPHAGOGASTRODUODENOSCOPY W/ PEG N/A 1/2/2024    Procedure: PEG;  Surgeon: Tani Day MD;  Location: [facility] ENDOSCOPY;  Service: Gastroenterology;  Laterality: N/A;    excision of colon      TRACHEOSTOMY N/A 12/29/2023    Procedure: CREATION, TRACHEOSTOMY;  Surgeon: Patricia Winslow MD;  Location: [facility] OR;  Service: ENT;  Laterality: N/A;  REQ 1130 //  NEEDS 2 SCRUBS      Social History     Socioeconomic History    Marital status:     Number of children: 9    Occupational History    Occupation: retired   Tobacco Use    Smoking status: Never    Smokeless tobacco: Never   Substance and Sexual Activity    Alcohol use: Not Currently    Drug use: Not Currently    Sexual activity: Not Currently     Partners: Female     Social Determinants of Health     Financial Resource Strain: Patient Unable To Answer (8/20/2024)    Overall Financial Resource Strain (CARDIA)     Difficulty of Paying Living Expenses: Patient unable to answer   Food Insecurity: Patient Unable To Answer (8/20/2024)    Hunger Vital Sign     Worried About Running Out of Food in the Last Year: Patient unable to answer     Ran Out of Food in the Last Year: Patient unable to answer   Transportation Needs: Patient Unable To Answer (8/20/2024)    TRANSPORTATION NEEDS     Transportation : Patient unable to answer   Physical Activity: Sufficiently Active (8/5/2024)    Exercise Vital Sign     Days of Exercise per Week: 5 days     Minutes of Exercise per Session: 30 min   Stress: Patient Unable To Answer (8/20/2024)    [employer] - Occupational Stress Questionnaire     Feeling of Stress : Patient unable to answer   Housing Stability: Patient Unable To Answer (8/20/2024)    Housing Stability Vital Sign     Unable to Pay for Housing in the Last Year: Patient unable to answer     Homeless in the Last Year: Patient unable to answer      Family History   Problem Relation Name Age of Onset    Hypertension Mother      Hypertension Father      Hypertension Sister          Reason for Follow-up:  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     Oncologic history:  # pT3,4 pN2 MX, at least stage III, well-differentiated neuroendocrine tumor of small bowel, multifocal, grade 1; large mesenteric mass (3.8 cm); 2:21 lymph nodes involved; small multifocal areas of tumor in the efren-intestinal adipose tissue.   Presented with small bowel obstruction.  Status post resection of 127 cm of small bowel and mesenteric mass in the base of the mesentery, on 11/02/2018.   -Our request for contrast-enhanced chest CT for staging, was denied   -12/26/2018: CT A/P with contrast: 25 mm area of mesenteric soft tissue may reflect residual mass or postsurgical change; 2 millimetric hepatic lesions are too small to characterize, stable from October 2018, suggest close attention on follow-up.   -12/27/2018: Colonoscopy: 3 mm hyperplastic sigmoid polyp; no malignancy   -12/10/2018: Chromogranin A level normal   -12/12/2018: 24-hour urine 5-HIAA level normal   -02/15/2019: Whole-body Ga-dotatate PET/CT: No findings to suggest somatostatin receptor avid disease   -05/26/2019-05/29/2019: Brief hospitalization with early small bowel obstruction, managed conservatively   -05/28/2019: Small bowel follow-through: Prompt passage of contrast through the small bowel.  Contrast passed quickly through small bowel and reached colon within 15 minutes; further contrast progression to rectum within 1 hour.  No discrete small bowel transition point.   -06/28/2019: Octreotide scan: No evidence of neuroendocrine tumor   -07/11/2019: X-ray abdomen flat and erect (abdominal pain): No acute intra-abdominal abnormality  -11/01/2019: Multiphasic contrast-enhanced CTs abdomen and pelvis and chest CT with contrast for surveillance: No evidence of residual, recurrent, or metastatic disease in chest, abdomen, or pelvis  -02/03/2020: TTE:  LVEF 55%; atrial fibrillation  -Follows up with cardiology for history of hypertension, dyslipidemia, CAD, history of NSTEMI in 2003, paroxysmal atrial fibrillation, HFpEF, second-degree AV block s/p [facility] PPM 11/13/2017 (upgraded to dual-chamber ICD 05/2018 secondary to V. fib arrest in 03/2018 in the setting of prolonged QT and hypokalemia)  -05/03/2021: Surveillance CT C/A/P with contrast (comparison: 02/02/2020): No new suspicious findings in C/A/P; decreased size of previously discussed mediastinal lymph nodes (AP window lymph nodes 6 mm, previously 8 mm)  -01/07/2022: CT A/P with contrast (comparison: 05/03/2021): Hepatic steatosis; small gallstones; retroperitoneal mildly enlarged lymph node, unchanged  -07/07/2022:  Surveillance CTs C/A/P with contrast (comparison:  CT A/P 01/07/2022; CT C/A/P 05/03/2021):  No recurrence or metastasis  -09/07/2022:  CT abdomen pelvis with contrast (abdominal pain) (comparison:  CT 07/07/2022):  -10/10/2022: Noncontrast chest CT (comparison:  07/07/2022):  No acute process identified  1. Interval resolution of previous left lower lobe ground-glass nodule, suggestive of infectious or inflammatory etiology.  2. No evidence of new or worsening intrathoracic process.         # History of coronary artery disease, atrial fibrillation, history of cardiac arrest with ventricular fibrillation, MI in 2003, history of previous DVTs, status post CPR 03/08/2018, on anticoagulation for history of DVTs and atrial fibrillation, history of second-degree AV block requiring PPM.        12/10/2018:   Presents for initial oncology consultation, accompanied by his 3 sisters.  Overall, doing well except for postoperative abdominal pain, 4 on a scale of 1-10, which keeps improving.  Mild fatigue.  No symptoms of carcinoid syndrome like flushing, diarrhea, or bronchoconstriction.  No weakness, fatigue, malaise, fevers, chills, anorexia, unintentional weight loss, nausea, vomiting, hematemesis,  melena, hematochezia, etc.    Interval History:  [No matching plan found]   [No matching plan found]   08/28/2024:   S/P multiple complications secondary to stroke, craniotomy, etc.  -12/19/2023: CT head for stroke (comparison: 05/23/2023):  No acute intracranial findings or significant interval change  -12/19/2023: CTA stroke multiphasic:  Decreased flow right anterior circulation including MCA and HÉCTOR on arterial phase  -12/20/2023: CT head without contrast (stroke; comparison 12/09/2023):  Worsening exam with development of 1.6 cm right-to-left midline shift  -12/20/2023: Craniotomy, right (Artem Can MD) (CVA due to thrombosis of right MCA)  -12/31/2023: Abdominal ultrasound, complete (abdominal distention):  Hepatic steatosis; small right-sided pleural effusion  -01/02/2024: CT head without contrast (stroke; comparison 12/22/2023):  Improved right cerebellar edema, hemorrhagic foci, mass effect and no residual midline shift  -01/17/2024: CT head without contrast (seizure) pelvic comparison: 01/02/2024):  Interval worsening of cerebral edema right cerebral hemisphere; hydrocephalus slightly worse; significant improvement intraparenchymal areas of hemorrhage previously seen  -01/17/2024: CT abdomen pelvis with IV contrast (nausea and vomiting):  Right lower quadrant mild inflammatory change in the mesentery with some punctate lymph nodes seen adjacent to 8, mesenteric adenitis; possible cystitis; left lower lobe and right lower lobe atelectasis with small left-sided pleural effusion  -02/06/2024: CT head without contrast (craniotomy, postop; comparison CT head 01/17/2024): No significant change from prior exam  -08/04/2024:  CT A/P with IV contrast (abdominal pain; comparison 07/18/2024):  Distal esophagitis  -07/18/2024:  CT C/A/P with IV contrast (vomiting, absent bowel sounds):  Dilated fluid-filled esophagus, consistent with GERD; urinary bladder wall thickening and inflammatory changes concerning for  cystitis  -08/04/2024: CT A/P with IV contrast (abdominal pain, nausea, vomiting):  Thickening of esophagus, distal esophagitis  -08/19/2024: CT C/A/P with contrast: New bilateral lung infiltrates may represent developing infectious process  [facility]  Has a tracheostomy.  Nonverbal.  Left side of bodies contracted.  Experienced devastating stroke earlier this year.  There is no family accompanying him.  My office tried to call his family to advise him of the results of recent scans; could not reason them.  We will keep trying to reach his family members to advise them of the status of underlying malignancy.      Medications:  Current Outpatient Medications on File Prior to Visit   Medication Sig Dispense Refill    ascorbic Acid (VITAMIN C) 500 mg CpSR 500 mg 2 (two) times daily. Per PEG tube      busPIRone (BUSPAR) 5 MG Tab 5 mg by Per G Tube route 3 (three) times daily.      cholestyramine (QUESTRAN) 4 gram packet 4 g once daily. Via PEG tube      cholestyramine-aspartame (QUESTRAN LIGHT) 4 gram PwPk 1 packet (4 g total) by Per G Tube route 2 (two) times daily. 180 packet 3    diltiaZEM (CARDIZEM) 60 MG tablet 60 mg by Per G Tube route every 6 (six) hours.      ferrous sulfate 300 mg (60 mg iron)/5 mL syrup Take 5 mLs (300 mg total) by mouth once daily. 450 mL 0    finasteride (PROSCAR) 5 mg tablet Take 1 tablet (5 mg total) by mouth once daily. 30 tablet 11    furosemide (LASIX) 80 MG tablet 80 mg by Per G Tube route as needed (for Edema).      L. acidophilus/L.bulgaricus (FLORANEX ORAL) 1 packet by PEG Tube route Daily.      levetiracetam 500 mg/5 mL (5 mL) Soln 1,500 mg by Per G Tube route 2 (two) times daily. Nursing home reports medication hold by MD until level redraw on Monday.      LIPITOR 10 mg tablet 10 mg by Per G Tube route once daily.      metoclopramide HCl (REGLAN) 5 mg/5 mL Soln 10 mg by Per G Tube route 3 (three) times daily before  "meals.      metoprolol tartrate (LOPRESSOR) 100 MG tablet 100 mg by Per G Tube route 2 (two) times daily.      miconazole NITRATE 2 % (MICOTIN) 2 % top powder Apply topically 2 (two) times daily.      multivitamin (THERAGRAN) per tablet 1 tablet by Per G Tube route once daily.      pantoprazole (PROTONIX) 40 mg injection Inject 40 mg into the vein 2 (two) times daily.      polyethylene glycol (GLYCOLAX) 17 gram PwPk Take 17 g by mouth 2 (two) times daily as needed for Constipation.      protein supplement (PROMOD PROTEIN ORAL) 30 mLs by Per G Tube route once daily.      QUEtiapine (SEROQUEL) 25 MG Tab 25 mg by Per G Tube route 2 (two) times daily.      scopolamine (TRANSDERM-SCOP) 1.3-1.5 mg (1 mg over 3 days) Place 1 patch onto the skin Every 3 (three) days.      sucralfate (CARAFATE) 1 gram tablet 1 tablet (1 g total) by Per G Tube route 4 (four) times daily before meals and nightly.      tamsulosin (FLOMAX) 0.4 mg Cap Take 1 capsule (0.4 mg total) by mouth every evening. 30 capsule 11    venlafaxine 75 mg TR24 1 tablet by Per G Tube route 2 (two) times a day.      XARELTO 20 mg Tab 1 tablet by Per G Tube route nightly.       No current facility-administered medications on file prior to visit.       Review of Systems:   All systems reviewed and found to be negative except for the symptoms detailed above    Physical Examination:   VITAL SIGNS:   Vitals:    08/28/24 0924   BP: 136/89   Pulse: 66   Resp: 20   Temp: 97.7 °F (36.5 °C)     Nonverbal secondary to stroke earlier in 2024.  Has a tracheostomy.  Left side of body is contracted.      No results for input(s): "CBC" in the last 72 hours.   No results for input(s): "CMP" in the last 72 hours.     Assessment:  Problem List Items Addressed This Visit          Oncology    Neuroendocrine carcinoma of small bowel     Well-differentiated neuroendocrine tumor of small bowel:  -presented with small-bowel obstruction   -S/P resection of 127 cm of small bowel and " mesenteric mass at the base of mesentery, 11/02/2018  -pT3/4 pN2 MX, at least stage III, well-differentiated neuroendocrine tumor of small bowel, multifocal, grade 1; large mesenteric mass (3.8 cm); 2:21 lymph nodes involved; small multifocal areas of tumor in the efren-intestinal adipose tissue  -Colonoscopy (12/27/2018): 3 mm hyperplastic sigmoid polyp  -No somatostatin receptor avid disease on whole-body gallium dotatate PET/CT (02/15/2019)  -No evidence of neuroendocrine tumor on octreotide scan (06/28/2019)  -No evidence of disease on surveillance CTs C/A/P 11/01/2019  -no recurrence or metastasis on surveillance CTs 07/07/2022  -no recurrence on CT A/P with contrast 09/07/2022  -08/04/2024:  CT A/P with IV contrast (abdominal pain; comparison 07/18/2024):  Distal esophagitis  -07/18/2024:  CT C/A/P with IV contrast (vomiting, absent bowel sounds):  Dilated fluid-filled esophagus, consistent with GERD; urinary bladder wall thickening and inflammatory changes concerning for cystitis  -08/04/2024: CT A/P with IV contrast (abdominal pain, nausea, vomiting):  Thickening of esophagus, distal esophagitis  -08/19/2024: CT C/A/P with contrast: New bilateral lung infiltrates may represent developing infectious process        History of coronary artery disease, atrial fibrillation  History of cardiac arrest with ventricular fibrillation, MI in 2003  History of previous DVTs  S/P CPR 03/08/2018  On anticoagulation for history of DVTs and atrial fibrillation  History of second-degree AV block requiring PPM.        Plan:    -S/P resection of small bowel and mesenteric mass 11/02/2018  >>>  -continue surveillance  -From 1 year post resection up to 10 years (11/2018-11/2028), every 12-24 months: History and physical, consider biochemical markers, consider abdominal +/-pelvic multiphasic CT or MRI with contrast, consider CT chest without contrast  >>>  -JESSIE as of 07/07/2022, 09/07/2022  -JESSIE on surveillance CTs C/A/P with  contrast 08/23/2023  -08/04/2024:  CT A/P with IV contrast (abdominal pain; comparison 07/18/2024):  Distal esophagitis  -07/18/2024:  CT C/A/P with IV contrast (vomiting, absent bowel sounds):  Dilated fluid-filled esophagus, consistent with GERD; urinary bladder wall thickening and inflammatory changes concerning for cystitis  -08/04/2024: CT A/P with IV contrast (abdominal pain, nausea, vomiting):  Thickening of esophagus, distal esophagitis  -08/19/2024: CT C/A/P with contrast: New bilateral lung infiltrates may represent developing infectious process  >>>  Continue annual surveillance   In August 2025, surveillance multiphasic CT scans of A/P, and noncontrast chest CT for surveillance, then follow-up with CBC and CMP    Follow-up in 1 year, with labs and scans.     Above discussed with him.  All questions answered.  Discussed labs and scans and gave him copies of relevant reports.  He understands and agrees with this plan.  ================================     Surveillance:  Underwent resection on 11/02/2018  1.  3-12 months post resection (until 11/2019):  -History and physical   -Consider biochemical markers as clinically indicated  -Abdominal with or without pelvic multiphasic CT or MRI with IV contrast, as clinically indicated  -Chest CT with and without contrast for primary lung/thymus tumors (as clinically indicated for primary GI tumors)     2.  >1-year post resection to 10 years:  Every 12-24 months:  -History and physical  -Consider biochemical markers as clinically indicated  -Consider abdominal with or without pelvic multiphasic CT or MRI with contrast  -Consider chest CT with and without contrast for primary lung/thymus tumors (as clinically indicated for primary GI tumors)     3. >10 years:  Consider surveillance as clinically indicated       Follow-up:  No follow-ups on file.

## 2024-08-28 NOTE — PROGRESS NOTES
Potassium 2.8, very low.        Plan:   Please inform nursing home  Check magnesium level   Infused potassium chloride 40 mEq IV per pharmacy protocol  Start potassium chloride 20 mEq p.o. q.day x2 weeks; no refills   In 2 weeks, recheck CMP and magnesium level  If treatment can not be provided at nursing home, then, patient needs to report to the ER.

## 2024-08-28 NOTE — TELEPHONE ENCOUNTER
Attempted to contact Beth Daniel, daughter in regards to patients appt today with no answer. Contacted Herlinda Daniel, daughter and informed of results per MD and of new appt annually. Verbalized understanding.

## 2024-08-28 NOTE — Clinical Note
In August 2025, surveillance multiphasic CT scans of A/P, and noncontrast chest CT for surveillance, then follow-up with CBC and CMP

## 2024-08-28 NOTE — TELEPHONE ENCOUNTER
Spoke with Chelsey Priest LPN with [facility] and informed of NON: Check Mag level, Infuse Potassium chloride 40 meq IV per pharmacy protocol, start potassium chloride 20 meq po q d x 2 weeks with no refills. In 2 weeks, recheck CMP and Mag level. If tx can not be provided at nursing home then, patient needs to report to ER. Nurse verbalized understanding.

## 2024-09-01 NOTE — PHYSICIAN QUERY
Question: Please clarify the integumentary diagnosis related to the lower Buttocks.    Provider Query Response:  Moisture associated dermatitis due to Fecal, Urinary, or Dual Incontinence

## 2024-09-01 NOTE — PHYSICIAN QUERY
Question: Please clarify the integumentary diagnosis related to the Right Groin.    Provider Query Response:  Moisture associated dermatitis due to Fecal, Urinary, or Dual Incontinence

## 2024-09-01 NOTE — PHYSICIAN QUERY
Question: Please clarify the integumentary diagnosis related to the left groin.    Provider Query Response:  Moisture associated dermatitis due to Fecal, Urinary, or Dual Incontinence

## 2024-09-01 NOTE — PHYSICIAN QUERY
Please clarify the infectious disease diagnosis.      Severe Sepsis with Acute Organ Dysfunction/Failure (please specify the organ dysfunction/failure): respiratory system

## 2024-09-01 NOTE — PHYSICIAN QUERY
Question: Please clarify the integumentary diagnosis related to the documentation outlined in the left knee.    Provider Query Response:  Pressure Injury/Decubitus Ulcer, Unstageable

## 2024-09-06 ENCOUNTER — LAB REQUISITION (OUTPATIENT)
Dept: LAB | Facility: HOSPITAL | Age: 69
End: 2024-09-06
Payer: MEDICARE

## 2024-09-06 DIAGNOSIS — I63.513 CEREBRAL INFARCTION DUE TO UNSPECIFIED OCCLUSION OR STENOSIS OF BILATERAL MIDDLE CEREBRAL ARTERIES: ICD-10-CM

## 2024-09-06 LAB
25(OH)D3+25(OH)D2 SERPL-MCNC: 29 NG/ML (ref 30–80)
ABS NEUT CALC (OHS): 21.04 X10(3)/MCL (ref 2.1–9.2)
ALBUMIN SERPL-MCNC: 3 G/DL (ref 3.4–4.8)
ALBUMIN/GLOB SERPL: 0.8 RATIO (ref 1.1–2)
ALP SERPL-CCNC: 130 UNIT/L (ref 40–150)
ALT SERPL-CCNC: 12 UNIT/L (ref 0–55)
ANION GAP SERPL CALC-SCNC: 16 MEQ/L
AST SERPL-CCNC: 18 UNIT/L (ref 5–34)
BILIRUB SERPL-MCNC: 0.9 MG/DL
BUN SERPL-MCNC: 12.5 MG/DL (ref 8.4–25.7)
CALCIUM SERPL-MCNC: 8.5 MG/DL (ref 8.8–10)
CHLORIDE SERPL-SCNC: 110 MMOL/L (ref 98–107)
CHOLEST SERPL-MCNC: 58 MG/DL
CHOLEST/HDLC SERPL: 2 {RATIO} (ref 0–5)
CO2 SERPL-SCNC: 16 MMOL/L (ref 23–31)
CREAT SERPL-MCNC: 1.57 MG/DL (ref 0.73–1.18)
CREAT/UREA NIT SERPL: 8
ERYTHROCYTE [DISTWIDTH] IN BLOOD BY AUTOMATED COUNT: 15.5 % (ref 11.5–17)
GFR SERPLBLD CREATININE-BSD FMLA CKD-EPI: 48 ML/MIN/1.73/M2
GLOBULIN SER-MCNC: 3.6 GM/DL (ref 2.4–3.5)
GLUCOSE SERPL-MCNC: 136 MG/DL (ref 82–115)
HCT VFR BLD AUTO: 42.3 % (ref 42–52)
HDLC SERPL-MCNC: 26 MG/DL (ref 35–60)
HGB BLD-MCNC: 13.5 G/DL (ref 14–18)
IRON SATN MFR SERPL: 9 % (ref 20–50)
IRON SERPL-MCNC: 19 UG/DL (ref 65–175)
LDLC SERPL CALC-MCNC: 21 MG/DL (ref 50–140)
LYMPH ABN # BLD MANUAL: 2 %
LYMPHOCYTES NFR BLD MANUAL: 1.93 X10(3)/MCL
LYMPHOCYTES NFR BLD MANUAL: 8 % (ref 13–40)
MCH RBC QN AUTO: 26 PG (ref 27–31)
MCHC RBC AUTO-ENTMCNC: 31.9 G/DL (ref 33–36)
MCV RBC AUTO: 81.5 FL (ref 80–94)
MONOCYTES NFR BLD MANUAL: 0.73 X10(3)/MCL (ref 0.1–1.3)
MONOCYTES NFR BLD MANUAL: 3 % (ref 2–11)
NEUTROPHILS NFR BLD MANUAL: 87 % (ref 47–80)
NRBC BLD AUTO-RTO: 0 %
PLATELET # BLD AUTO: 486 X10(3)/MCL (ref 130–400)
PLATELET # BLD EST: ABNORMAL 10*3/UL
PMV BLD AUTO: 11.1 FL (ref 7.4–10.4)
POTASSIUM SERPL-SCNC: 3.6 MMOL/L (ref 3.5–5.1)
PREALB SERPL-MCNC: 13.3 MG/DL (ref 16–42)
PROT SERPL-MCNC: 6.6 GM/DL (ref 5.8–7.6)
RBC # BLD AUTO: 5.19 X10(6)/MCL (ref 4.7–6.1)
RBC MORPH BLD: NORMAL
SODIUM SERPL-SCNC: 142 MMOL/L (ref 136–145)
TIBC SERPL-MCNC: 184 UG/DL (ref 69–240)
TIBC SERPL-MCNC: 203 UG/DL (ref 250–450)
TRANSFERRIN SERPL-MCNC: 187 MG/DL (ref 163–344)
TRIGL SERPL-MCNC: 53 MG/DL (ref 34–140)
VLDLC SERPL CALC-MCNC: 11 MG/DL
WBC # BLD AUTO: 24.18 X10(3)/MCL (ref 4.5–11.5)

## 2024-09-06 PROCEDURE — 83550 IRON BINDING TEST: CPT | Performed by: INTERNAL MEDICINE

## 2024-09-06 PROCEDURE — 84134 ASSAY OF PREALBUMIN: CPT | Performed by: INTERNAL MEDICINE

## 2024-09-06 PROCEDURE — 80061 LIPID PANEL: CPT | Performed by: INTERNAL MEDICINE

## 2024-09-06 PROCEDURE — 82306 VITAMIN D 25 HYDROXY: CPT | Performed by: INTERNAL MEDICINE

## 2024-09-06 PROCEDURE — 80177 DRUG SCRN QUAN LEVETIRACETAM: CPT | Performed by: INTERNAL MEDICINE

## 2024-09-06 PROCEDURE — 85007 BL SMEAR W/DIFF WBC COUNT: CPT | Performed by: INTERNAL MEDICINE

## 2024-09-06 PROCEDURE — 85027 COMPLETE CBC AUTOMATED: CPT | Performed by: INTERNAL MEDICINE

## 2024-09-06 PROCEDURE — 83540 ASSAY OF IRON: CPT | Performed by: INTERNAL MEDICINE

## 2024-09-06 PROCEDURE — 80053 COMPREHEN METABOLIC PANEL: CPT | Performed by: INTERNAL MEDICINE

## 2024-09-07 LAB — LEVETIRACETAM SERPL-MCNC: 21.1 MCG/ML (ref 10–40)

## 2024-09-09 ENCOUNTER — TELEPHONE (OUTPATIENT)
Dept: NEUROSURGERY | Facility: CLINIC | Age: 69
End: 2024-09-09
Payer: MEDICARE

## 2024-09-09 NOTE — TELEPHONE ENCOUNTER
I called patient and daughter to reschedule patient appointment with Dr. Thomas as patient was a no call; no show for previous appointment on 8/12. I left a voicemail for daughter to give the office a call back.

## 2024-10-01 ENCOUNTER — LAB REQUISITION (OUTPATIENT)
Dept: LAB | Facility: HOSPITAL | Age: 69
End: 2024-10-01
Payer: MEDICARE

## 2024-10-01 DIAGNOSIS — B99.9 UNSPECIFIED INFECTIOUS DISEASE: ICD-10-CM

## 2024-10-01 LAB
ANION GAP SERPL CALC-SCNC: 15 MEQ/L
BUN SERPL-MCNC: 30.4 MG/DL (ref 8.4–25.7)
CALCIUM SERPL-MCNC: 9.2 MG/DL (ref 8.8–10)
CHLORIDE SERPL-SCNC: 114 MMOL/L (ref 98–107)
CO2 SERPL-SCNC: 18 MMOL/L (ref 23–31)
CREAT SERPL-MCNC: 1.2 MG/DL (ref 0.73–1.18)
CREAT/UREA NIT SERPL: 25
GFR SERPLBLD CREATININE-BSD FMLA CKD-EPI: >60 ML/MIN/1.73/M2
GLUCOSE SERPL-MCNC: 96 MG/DL (ref 82–115)
POTASSIUM SERPL-SCNC: 4.5 MMOL/L (ref 3.5–5.1)
SODIUM SERPL-SCNC: 147 MMOL/L (ref 136–145)

## 2024-10-01 PROCEDURE — 80048 BASIC METABOLIC PNL TOTAL CA: CPT | Performed by: NURSE PRACTITIONER

## 2024-10-03 ENCOUNTER — LAB REQUISITION (OUTPATIENT)
Dept: LAB | Facility: HOSPITAL | Age: 69
End: 2024-10-03
Payer: MEDICARE

## 2024-10-03 DIAGNOSIS — I63.513 CEREBRAL INFARCTION DUE TO UNSPECIFIED OCCLUSION OR STENOSIS OF BILATERAL MIDDLE CEREBRAL ARTERIES: ICD-10-CM

## 2024-10-03 LAB
ALBUMIN SERPL-MCNC: 2.8 G/DL (ref 3.4–4.8)
ALBUMIN/GLOB SERPL: 0.8 RATIO (ref 1.1–2)
ALP SERPL-CCNC: 132 UNIT/L (ref 40–150)
ALT SERPL-CCNC: 16 UNIT/L (ref 0–55)
ANION GAP SERPL CALC-SCNC: 10 MEQ/L
AST SERPL-CCNC: 21 UNIT/L (ref 5–34)
BILIRUB SERPL-MCNC: 0.6 MG/DL
BUN SERPL-MCNC: 30.2 MG/DL (ref 8.4–25.7)
CALCIUM SERPL-MCNC: 8.6 MG/DL (ref 8.8–10)
CHLORIDE SERPL-SCNC: 114 MMOL/L (ref 98–107)
CO2 SERPL-SCNC: 23 MMOL/L (ref 23–31)
CREAT SERPL-MCNC: 0.95 MG/DL (ref 0.73–1.18)
CREAT/UREA NIT SERPL: 32
GFR SERPLBLD CREATININE-BSD FMLA CKD-EPI: >60 ML/MIN/1.73/M2
GLOBULIN SER-MCNC: 3.7 GM/DL (ref 2.4–3.5)
GLUCOSE SERPL-MCNC: 121 MG/DL (ref 82–115)
POTASSIUM SERPL-SCNC: 3.5 MMOL/L (ref 3.5–5.1)
PROT SERPL-MCNC: 6.5 GM/DL (ref 5.8–7.6)
SODIUM SERPL-SCNC: 147 MMOL/L (ref 136–145)

## 2024-10-03 PROCEDURE — 80053 COMPREHEN METABOLIC PANEL: CPT | Performed by: INTERNAL MEDICINE

## 2024-10-20 ENCOUNTER — HOSPITAL ENCOUNTER (INPATIENT)
Facility: HOSPITAL | Age: 69
LOS: 29 days | DRG: 853 | End: 2024-11-18
Attending: STUDENT IN AN ORGANIZED HEALTH CARE EDUCATION/TRAINING PROGRAM | Admitting: INTERNAL MEDICINE
Payer: MEDICARE

## 2024-10-20 DIAGNOSIS — A41.9 SEPSIS: ICD-10-CM

## 2024-10-20 DIAGNOSIS — J95.851 VAP (VENTILATOR-ASSOCIATED PNEUMONIA): ICD-10-CM

## 2024-10-20 DIAGNOSIS — I49.01 CARDIAC ARREST WITH VENTRICULAR FIBRILLATION: ICD-10-CM

## 2024-10-20 DIAGNOSIS — R11.2 NAUSEA AND VOMITING, UNSPECIFIED VOMITING TYPE: ICD-10-CM

## 2024-10-20 DIAGNOSIS — I48.91 ATRIAL FIBRILLATION: ICD-10-CM

## 2024-10-20 DIAGNOSIS — I49.9 CARDIAC RHYTHM DISORDER OR DISTURBANCE OR CHANGE: ICD-10-CM

## 2024-10-20 DIAGNOSIS — L89.154 PRESSURE ULCER OF SACRAL REGION, STAGE 4: ICD-10-CM

## 2024-10-20 DIAGNOSIS — R00.0 TACHYCARDIA, UNSPECIFIED: ICD-10-CM

## 2024-10-20 DIAGNOSIS — I48.91 A-FIB: ICD-10-CM

## 2024-10-20 DIAGNOSIS — I49.9 IRREGULAR CARDIAC RHYTHM: ICD-10-CM

## 2024-10-20 DIAGNOSIS — R00.0 TACHYCARDIA: ICD-10-CM

## 2024-10-20 DIAGNOSIS — Z93.1 FEEDING INTOLERANCE MANAGED WITH GASTROSTOMY TUBE: ICD-10-CM

## 2024-10-20 DIAGNOSIS — M79.89 SWELLING OF ARM: ICD-10-CM

## 2024-10-20 DIAGNOSIS — N39.0 UTI (URINARY TRACT INFECTION): ICD-10-CM

## 2024-10-20 DIAGNOSIS — R63.39 FEEDING INTOLERANCE MANAGED WITH GASTROSTOMY TUBE: ICD-10-CM

## 2024-10-20 DIAGNOSIS — K94.20 COMPLICATION OF FEEDING TUBE: ICD-10-CM

## 2024-10-20 DIAGNOSIS — N39.0 URINARY TRACT INFECTION WITHOUT HEMATURIA, SITE UNSPECIFIED: ICD-10-CM

## 2024-10-20 DIAGNOSIS — R78.81 BACTEREMIA: ICD-10-CM

## 2024-10-20 DIAGNOSIS — K31.84 GASTROPARESIS: ICD-10-CM

## 2024-10-20 DIAGNOSIS — R07.9 CHEST PAIN: ICD-10-CM

## 2024-10-20 DIAGNOSIS — R50.9 FEVER, UNSPECIFIED FEVER CAUSE: Primary | ICD-10-CM

## 2024-10-20 DIAGNOSIS — I46.9 CARDIAC ARREST WITH VENTRICULAR FIBRILLATION: ICD-10-CM

## 2024-10-20 PROBLEM — D72.829 LEUKOCYTOSIS: Status: ACTIVE | Noted: 2024-10-20

## 2024-10-20 LAB
ABS NEUT (OLG): 13.38 X10(3)/MCL (ref 2.1–9.2)
ALBUMIN SERPL-MCNC: 2.6 G/DL (ref 3.4–4.8)
ALBUMIN/GLOB SERPL: 0.6 RATIO (ref 1.1–2)
ALLENS TEST BLOOD GAS (OHS): YES
ALP SERPL-CCNC: 105 UNIT/L (ref 40–150)
ALT SERPL-CCNC: 17 UNIT/L (ref 0–55)
AMORPH URATE CRY URNS QL MICRO: ABNORMAL /UL
ANION GAP SERPL CALC-SCNC: 13 MEQ/L
AST SERPL-CCNC: 18 UNIT/L (ref 5–34)
BACTERIA #/AREA URNS AUTO: ABNORMAL /HPF
BACTERIA #/AREA URNS AUTO: ABNORMAL /HPF
BASE EXCESS BLD CALC-SCNC: -1.1 MMOL/L
BASOPHILS NFR BLD MANUAL: 0.5 X10(3)/MCL (ref 0–0.2)
BASOPHILS NFR BLD MANUAL: 3 %
BILIRUB SERPL-MCNC: 0.8 MG/DL
BILIRUB UR QL STRIP.AUTO: NEGATIVE
BILIRUB UR QL STRIP.AUTO: NEGATIVE
BLOOD GAS SAMPLE TYPE (OHS): ABNORMAL
BUN SERPL-MCNC: 45.3 MG/DL (ref 8.4–25.7)
CA-I BLD-SCNC: 1.04 MMOL/L (ref 1.12–1.23)
CALCIUM SERPL-MCNC: 8.4 MG/DL (ref 8.8–10)
CHLORIDE SERPL-SCNC: 123 MMOL/L (ref 98–107)
CLARITY UR: ABNORMAL
CLARITY UR: ABNORMAL
CO2 BLDA-SCNC: 21.2 MMOL/L
CO2 SERPL-SCNC: 19 MMOL/L (ref 23–31)
COLOR UR AUTO: YELLOW
COLOR UR AUTO: YELLOW
CREAT SERPL-MCNC: 1.48 MG/DL (ref 0.72–1.25)
CREAT/UREA NIT SERPL: 31
DRAWN BY BLOOD GAS (OHS): ABNORMAL
ERYTHROCYTE [DISTWIDTH] IN BLOOD BY AUTOMATED COUNT: 17.5 % (ref 11.5–17)
FLUAV AG UPPER RESP QL IA.RAPID: NOT DETECTED
FLUBV AG UPPER RESP QL IA.RAPID: NOT DETECTED
GFR SERPLBLD CREATININE-BSD FMLA CKD-EPI: 51 ML/MIN/1.73/M2
GLOBULIN SER-MCNC: 4.7 GM/DL (ref 2.4–3.5)
GLUCOSE SERPL-MCNC: 157 MG/DL (ref 82–115)
GLUCOSE UR QL STRIP: NORMAL
GLUCOSE UR QL STRIP: NORMAL
HCO3 BLDA-SCNC: 20.4 MMOL/L (ref 22–26)
HCT VFR BLD AUTO: 37.1 % (ref 42–52)
HGB BLD-MCNC: 11.5 G/DL (ref 14–18)
HGB UR QL STRIP: ABNORMAL
HGB UR QL STRIP: ABNORMAL
INHALED O2 CONCENTRATION: 40 %
INSTRUMENT WBC (OLG): 16.52 X10(3)/MCL
KETONES UR QL STRIP: NEGATIVE
KETONES UR QL STRIP: NEGATIVE
LACTATE SERPL-SCNC: 2.1 MMOL/L (ref 0.5–2.2)
LACTATE SERPL-SCNC: 3.3 MMOL/L (ref 0.5–2.2)
LEUKOCYTE ESTERASE UR QL STRIP: 250
LEUKOCYTE ESTERASE UR QL STRIP: 250
LYMPHOCYTES NFR BLD MANUAL: 16 %
LYMPHOCYTES NFR BLD MANUAL: 2.64 X10(3)/MCL
MAGNESIUM SERPL-MCNC: 2.6 MG/DL (ref 1.6–2.6)
MCH RBC QN AUTO: 26.3 PG (ref 27–31)
MCHC RBC AUTO-ENTMCNC: 31 G/DL (ref 33–36)
MCV RBC AUTO: 84.7 FL (ref 80–94)
MECH RR (OHS): 24 B/MIN
MODE (OHS): AC
MONOCYTES NFR BLD MANUAL: 0.17 X10(3)/MCL (ref 0.1–1.3)
MONOCYTES NFR BLD MANUAL: 1 %
MRSA PCR SCRN (OHS): NOT DETECTED
MUCOUS THREADS URNS QL MICRO: ABNORMAL /LPF
MUCOUS THREADS URNS QL MICRO: ABNORMAL /LPF
NEUTROPHILS NFR BLD MANUAL: 81 %
NITRITE UR QL STRIP: NEGATIVE
NITRITE UR QL STRIP: NEGATIVE
NRBC BLD AUTO-RTO: 0.1 %
OXYGEN DEVICE BLOOD GAS (OHS): ABNORMAL
PCO2 BLDA: 25 MMHG (ref 35–45)
PEEP RESPIRATORY: 5 CMH2O
PH BLDA: 7.52 [PH] (ref 7.35–7.45)
PH UR STRIP: 5.5 [PH]
PH UR STRIP: 6 [PH]
PLATELET # BLD AUTO: 253 X10(3)/MCL (ref 130–400)
PLATELET # BLD EST: NORMAL 10*3/UL
PMV BLD AUTO: 11.7 FL (ref 7.4–10.4)
PO2 BLDA: 155 MMHG (ref 80–100)
POTASSIUM BLOOD GAS (OHS): 3.6 MMOL/L (ref 3.5–5)
POTASSIUM SERPL-SCNC: 5.1 MMOL/L (ref 3.5–5.1)
PROT SERPL-MCNC: 7.3 GM/DL (ref 5.8–7.6)
PROT UR QL STRIP: ABNORMAL
PROT UR QL STRIP: ABNORMAL
RBC # BLD AUTO: 4.38 X10(6)/MCL (ref 4.7–6.1)
RBC #/AREA URNS AUTO: >100 /HPF
RBC #/AREA URNS AUTO: >100 /HPF
RBC MORPH BLD: NORMAL
RSV A 5' UTR RNA NPH QL NAA+PROBE: NOT DETECTED
SAMPLE SITE BLOOD GAS (OHS): ABNORMAL
SAO2 % BLDA: 100 %
SARS-COV-2 RNA RESP QL NAA+PROBE: NOT DETECTED
SODIUM BLOOD GAS (OHS): 145 MMOL/L (ref 137–145)
SODIUM SERPL-SCNC: 155 MMOL/L (ref 136–145)
SP GR UR STRIP.AUTO: 1.02 (ref 1–1.03)
SP GR UR STRIP.AUTO: 1.03 (ref 1–1.03)
SPONT+MECH VT ON VENT: 500 ML
SQUAMOUS #/AREA URNS LPF: ABNORMAL /HPF
SQUAMOUS #/AREA URNS LPF: ABNORMAL /HPF
TROPONIN I SERPL-MCNC: 0.09 NG/ML (ref 0–0.04)
TROPONIN I SERPL-MCNC: 0.12 NG/ML (ref 0–0.04)
TSH SERPL-ACNC: 2.6 UIU/ML (ref 0.35–4.94)
UROBILINOGEN UR STRIP-ACNC: NORMAL
UROBILINOGEN UR STRIP-ACNC: NORMAL
WBC # BLD AUTO: 16.52 X10(3)/MCL (ref 4.5–11.5)
WBC #/AREA URNS AUTO: >100 /HPF
WBC #/AREA URNS AUTO: ABNORMAL /HPF

## 2024-10-20 PROCEDURE — 84484 ASSAY OF TROPONIN QUANT: CPT | Performed by: STUDENT IN AN ORGANIZED HEALTH CARE EDUCATION/TRAINING PROGRAM

## 2024-10-20 PROCEDURE — 5A1955Z RESPIRATORY VENTILATION, GREATER THAN 96 CONSECUTIVE HOURS: ICD-10-PCS | Performed by: STUDENT IN AN ORGANIZED HEALTH CARE EDUCATION/TRAINING PROGRAM

## 2024-10-20 PROCEDURE — 87641 MR-STAPH DNA AMP PROBE: CPT | Performed by: STUDENT IN AN ORGANIZED HEALTH CARE EDUCATION/TRAINING PROGRAM

## 2024-10-20 PROCEDURE — 81001 URINALYSIS AUTO W/SCOPE: CPT

## 2024-10-20 PROCEDURE — 63600175 PHARM REV CODE 636 W HCPCS: Performed by: STUDENT IN AN ORGANIZED HEALTH CARE EDUCATION/TRAINING PROGRAM

## 2024-10-20 PROCEDURE — 87184 SC STD DISK METHOD PER PLATE: CPT | Performed by: STUDENT IN AN ORGANIZED HEALTH CARE EDUCATION/TRAINING PROGRAM

## 2024-10-20 PROCEDURE — 96375 TX/PRO/DX INJ NEW DRUG ADDON: CPT

## 2024-10-20 PROCEDURE — 94799 UNLISTED PULMONARY SVC/PX: CPT

## 2024-10-20 PROCEDURE — 94761 N-INVAS EAR/PLS OXIMETRY MLT: CPT

## 2024-10-20 PROCEDURE — 99900031 HC PATIENT EDUCATION (STAT)

## 2024-10-20 PROCEDURE — 99900035 HC TECH TIME PER 15 MIN (STAT)

## 2024-10-20 PROCEDURE — 25000003 PHARM REV CODE 250

## 2024-10-20 PROCEDURE — 84443 ASSAY THYROID STIM HORMONE: CPT | Performed by: STUDENT IN AN ORGANIZED HEALTH CARE EDUCATION/TRAINING PROGRAM

## 2024-10-20 PROCEDURE — 96376 TX/PRO/DX INJ SAME DRUG ADON: CPT

## 2024-10-20 PROCEDURE — 87070 CULTURE OTHR SPECIMN AEROBIC: CPT

## 2024-10-20 PROCEDURE — 25000003 PHARM REV CODE 250: Performed by: STUDENT IN AN ORGANIZED HEALTH CARE EDUCATION/TRAINING PROGRAM

## 2024-10-20 PROCEDURE — 27200966 HC CLOSED SUCTION SYSTEM

## 2024-10-20 PROCEDURE — 83605 ASSAY OF LACTIC ACID: CPT | Performed by: STUDENT IN AN ORGANIZED HEALTH CARE EDUCATION/TRAINING PROGRAM

## 2024-10-20 PROCEDURE — 82803 BLOOD GASES ANY COMBINATION: CPT

## 2024-10-20 PROCEDURE — 87086 URINE CULTURE/COLONY COUNT: CPT | Performed by: STUDENT IN AN ORGANIZED HEALTH CARE EDUCATION/TRAINING PROGRAM

## 2024-10-20 PROCEDURE — 27100171 HC OXYGEN HIGH FLOW UP TO 24 HOURS

## 2024-10-20 PROCEDURE — 94002 VENT MGMT INPAT INIT DAY: CPT

## 2024-10-20 PROCEDURE — 94760 N-INVAS EAR/PLS OXIMETRY 1: CPT

## 2024-10-20 PROCEDURE — 99291 CRITICAL CARE FIRST HOUR: CPT

## 2024-10-20 PROCEDURE — 96360 HYDRATION IV INFUSION INIT: CPT | Mod: 59

## 2024-10-20 PROCEDURE — 85027 COMPLETE CBC AUTOMATED: CPT | Performed by: STUDENT IN AN ORGANIZED HEALTH CARE EDUCATION/TRAINING PROGRAM

## 2024-10-20 PROCEDURE — 99900022

## 2024-10-20 PROCEDURE — 81001 URINALYSIS AUTO W/SCOPE: CPT | Performed by: STUDENT IN AN ORGANIZED HEALTH CARE EDUCATION/TRAINING PROGRAM

## 2024-10-20 PROCEDURE — 83735 ASSAY OF MAGNESIUM: CPT | Performed by: STUDENT IN AN ORGANIZED HEALTH CARE EDUCATION/TRAINING PROGRAM

## 2024-10-20 PROCEDURE — 36600 WITHDRAWAL OF ARTERIAL BLOOD: CPT

## 2024-10-20 PROCEDURE — 99900026 HC AIRWAY MAINTENANCE (STAT)

## 2024-10-20 PROCEDURE — 0241U COVID/RSV/FLU A&B PCR: CPT | Performed by: STUDENT IN AN ORGANIZED HEALTH CARE EDUCATION/TRAINING PROGRAM

## 2024-10-20 PROCEDURE — 84484 ASSAY OF TROPONIN QUANT: CPT

## 2024-10-20 PROCEDURE — 87154 CUL TYP ID BLD PTHGN 6+ TRGT: CPT | Performed by: STUDENT IN AN ORGANIZED HEALTH CARE EDUCATION/TRAINING PROGRAM

## 2024-10-20 PROCEDURE — 96365 THER/PROPH/DIAG IV INF INIT: CPT | Mod: 59

## 2024-10-20 PROCEDURE — 80053 COMPREHEN METABOLIC PANEL: CPT | Performed by: STUDENT IN AN ORGANIZED HEALTH CARE EDUCATION/TRAINING PROGRAM

## 2024-10-20 PROCEDURE — 20000000 HC ICU ROOM

## 2024-10-20 PROCEDURE — 63600175 PHARM REV CODE 636 W HCPCS

## 2024-10-20 PROCEDURE — 96366 THER/PROPH/DIAG IV INF ADDON: CPT

## 2024-10-20 RX ORDER — MUPIROCIN 20 MG/G
OINTMENT TOPICAL 2 TIMES DAILY
Status: COMPLETED | OUTPATIENT
Start: 2024-10-22 | End: 2024-10-26

## 2024-10-20 RX ORDER — ATORVASTATIN CALCIUM 10 MG/1
10 TABLET, FILM COATED ORAL DAILY
Status: DISCONTINUED | OUTPATIENT
Start: 2024-10-21 | End: 2024-11-12

## 2024-10-20 RX ORDER — GUAIFENESIN AND DEXTROMETHORPHAN HYDROBROMIDE 10; 100 MG/5ML; MG/5ML
5 SYRUP ORAL EVERY 6 HOURS PRN
Status: DISCONTINUED | OUTPATIENT
Start: 2024-10-20 | End: 2024-10-21

## 2024-10-20 RX ORDER — SODIUM CHLORIDE 0.9 % (FLUSH) 0.9 %
10 SYRINGE (ML) INJECTION EVERY 12 HOURS PRN
Status: DISCONTINUED | OUTPATIENT
Start: 2024-10-20 | End: 2024-11-18 | Stop reason: HOSPADM

## 2024-10-20 RX ORDER — DILTIAZEM HYDROCHLORIDE 5 MG/ML
10 INJECTION INTRAVENOUS
Status: COMPLETED | OUTPATIENT
Start: 2024-10-20 | End: 2024-10-20

## 2024-10-20 RX ORDER — GLUCAGON 1 MG
1 KIT INJECTION
Status: DISCONTINUED | OUTPATIENT
Start: 2024-10-20 | End: 2024-11-18 | Stop reason: HOSPADM

## 2024-10-20 RX ORDER — CEFEPIME HYDROCHLORIDE 2 G/1
2 INJECTION, POWDER, FOR SOLUTION INTRAVENOUS
Status: DISCONTINUED | OUTPATIENT
Start: 2024-10-20 | End: 2024-10-23

## 2024-10-20 RX ORDER — SODIUM CHLORIDE, SODIUM LACTATE, POTASSIUM CHLORIDE, CALCIUM CHLORIDE 600; 310; 30; 20 MG/100ML; MG/100ML; MG/100ML; MG/100ML
INJECTION, SOLUTION INTRAVENOUS CONTINUOUS
Status: DISCONTINUED | OUTPATIENT
Start: 2024-10-20 | End: 2024-10-21

## 2024-10-20 RX ORDER — DILTIAZEM HYDROCHLORIDE 60 MG/1
60 TABLET, FILM COATED ORAL EVERY 6 HOURS
Status: DISCONTINUED | OUTPATIENT
Start: 2024-10-21 | End: 2024-10-21

## 2024-10-20 RX ORDER — PANTOPRAZOLE SODIUM 40 MG/10ML
40 INJECTION, POWDER, LYOPHILIZED, FOR SOLUTION INTRAVENOUS DAILY
Status: DISCONTINUED | OUTPATIENT
Start: 2024-10-21 | End: 2024-11-18 | Stop reason: HOSPADM

## 2024-10-20 RX ORDER — ACETAMINOPHEN 325 MG/1
650 TABLET ORAL EVERY 6 HOURS PRN
Status: DISCONTINUED | OUTPATIENT
Start: 2024-10-20 | End: 2024-11-18 | Stop reason: HOSPADM

## 2024-10-20 RX ORDER — ACETAMINOPHEN 10 MG/ML
1000 INJECTION, SOLUTION INTRAVENOUS ONCE
Status: COMPLETED | OUTPATIENT
Start: 2024-10-20 | End: 2024-10-20

## 2024-10-20 RX ORDER — HYDRALAZINE HYDROCHLORIDE 20 MG/ML
10 INJECTION INTRAMUSCULAR; INTRAVENOUS EVERY 4 HOURS PRN
Status: DISCONTINUED | OUTPATIENT
Start: 2024-10-20 | End: 2024-11-18 | Stop reason: HOSPADM

## 2024-10-20 RX ORDER — NALOXONE HCL 0.4 MG/ML
0.02 VIAL (ML) INJECTION
Status: DISCONTINUED | OUTPATIENT
Start: 2024-10-20 | End: 2024-11-18 | Stop reason: HOSPADM

## 2024-10-20 RX ORDER — ENOXAPARIN SODIUM 100 MG/ML
40 INJECTION SUBCUTANEOUS EVERY 24 HOURS
Status: DISCONTINUED | OUTPATIENT
Start: 2024-10-20 | End: 2024-10-20

## 2024-10-20 RX ADMIN — AMIODARONE HYDROCHLORIDE 150 MG: 1.5 INJECTION, SOLUTION INTRAVENOUS at 09:10

## 2024-10-20 RX ADMIN — SODIUM CHLORIDE 1000 ML: 9 INJECTION, SOLUTION INTRAVENOUS at 06:10

## 2024-10-20 RX ADMIN — DILTIAZEM HYDROCHLORIDE 10 MG: 5 INJECTION INTRAVENOUS at 04:10

## 2024-10-20 RX ADMIN — DILTIAZEM HYDROCHLORIDE 5 MG/HR: 5 INJECTION, SOLUTION INTRAVENOUS at 05:10

## 2024-10-20 RX ADMIN — RIVAROXABAN 20 MG: 10 TABLET, FILM COATED ORAL at 09:10

## 2024-10-20 RX ADMIN — SODIUM CHLORIDE, POTASSIUM CHLORIDE, SODIUM LACTATE AND CALCIUM CHLORIDE: 600; 310; 30; 20 INJECTION, SOLUTION INTRAVENOUS at 08:10

## 2024-10-20 RX ADMIN — AMIODARONE HYDROCHLORIDE 1 MG/MIN: 1.8 INJECTION, SOLUTION INTRAVENOUS at 09:10

## 2024-10-20 RX ADMIN — CEFEPIME 2 G: 2 INJECTION, POWDER, FOR SOLUTION INTRAVENOUS at 04:10

## 2024-10-20 RX ADMIN — VANCOMYCIN HYDROCHLORIDE 1500 MG: 1.5 INJECTION, POWDER, LYOPHILIZED, FOR SOLUTION INTRAVENOUS at 05:10

## 2024-10-20 RX ADMIN — SODIUM CHLORIDE 1905 ML: 9 INJECTION, SOLUTION INTRAVENOUS at 03:10

## 2024-10-20 RX ADMIN — ACETAMINOPHEN 1000 MG: 10 INJECTION, SOLUTION INTRAVENOUS at 04:10

## 2024-10-20 RX ADMIN — DILTIAZEM HYDROCHLORIDE 10 MG: 5 INJECTION INTRAVENOUS at 05:10

## 2024-10-20 NOTE — H&P
[facility] - Emergency Dept  Pulmonary Critical Care Note    Patient Name: Delio Daniel Jr.  MRN: [id number]  Admission Date: 10/20/2024  Hospital Length of Stay: 0 days  Code Status: Full Code  Attending Provider: Itz Francisco MD  Primary Care Provider: Jl Briones MD     Subjective:     HPI:   Patient is a 70 y/o male with extensive PMH who presented from NH on 10/20/24 with decreased responsiveness, severe sepsis, and recurrent UTI. 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     Patient transferred to ED from [facility] with lethargy and tachycardia. Family at bedside reports patient is nonverbal at baseline but typically more alert. In the ED, patient is febrile, tachycardic with -190s, tachypneic, /60. EKG shows Afib with RVR. Diltiazem drip started in ED. Labs are significant for WBC of 16.5, lactic acid of 3.3, Cr 1.48, BUN 45.3, Na 155, K+ 5.1, troponin elevated at 0.118. UA with evidence of UTI. Of note, pt was being treated outpatient for a UTI, currently on day 4 of 7 day Rocephin course. Urine culture 10/16/24 with multidrug resistant Klebsiella pneumonia and Proteus mirabilis with susceptibility to Cefepime. Patient received 3L of NS and initiated on Vanc and Cefepime in ED. Admitted to the ICU on mechanical ventilation via trach.      Hospital Course/Significant events:  10/20/24: Admitted to ICU for severe sepsis     24 Hour Interval History:  Pending     Past Medical History:   Diagnosis Date    Arthritis     Atrial fibrillation     BPH (benign prostatic hyperplasia)     Cardiac arrest     Coronary artery disease     Cyst, kidney, acquired     Diverticulosis      Hyperlipidemia     Hypertension     MI (myocardial infarction)     Obesity     Steatosis of liver     Stroke        Past Surgical History:   Procedure Laterality Date    A-V CARDIAC PACEMAKER INSERTION Right     CARDIAC CATHETERIZATION      COLONOSCOPY W/ BIOPSIES      CRANIECTOMY Right 12/20/2023    Procedure: CRANIECTOMY;  Surgeon: Artem Can MD;  Location: [facility] OR;  Service: Neurosurgery;  Laterality: Right;    ESOPHAGOGASTRODUODENOSCOPY W/ PEG N/A 1/2/2024    Procedure: PEG;  Surgeon: Tani Day MD;  Location: [facility] ENDOSCOPY;  Service: Gastroenterology;  Laterality: N/A;    excision of colon      TRACHEOSTOMY N/A 12/29/2023    Procedure: CREATION, TRACHEOSTOMY;  Surgeon: Patricia Winslow MD;  Location: [facility] OR;  Service: ENT;  Laterality: N/A;  REQ 1130 //  NEEDS 2 SCRUBS       Social History     Socioeconomic History    Marital status:     Number of children: 9   Occupational History    Occupation: retired   Tobacco Use    Smoking status: Never    Smokeless tobacco: Never   Substance and Sexual Activity    Alcohol use: Not Currently    Drug use: Not Currently    Sexual activity: Not Currently     Partners: Female     Social Drivers of Health     Financial Resource Strain: Patient Unable To Answer (8/20/2024)    Overall Financial Resource Strain (CARDIA)     Difficulty of Paying Living Expenses: Patient unable to answer   Food Insecurity: Patient Unable To Answer (8/20/2024)    Hunger Vital Sign     Worried About Running Out of Food in the Last Year: Patient unable to answer     Ran Out of Food in the Last Year: Patient unable to answer   Transportation Needs: Patient Unable To Answer (8/20/2024)    TRANSPORTATION NEEDS     Transportation : Patient unable to answer   Physical Activity: Sufficiently Active (8/5/2024)    Exercise Vital Sign     Days of Exercise per Week: 5 days     Minutes of Exercise per Session: 30 min   Stress: Patient Unable To Answer (8/20/2024)    Citizen of the [place]  [place] [employer] - Occupational Stress Questionnaire     Feeling of Stress : Patient unable to answer   Housing Stability: Patient Unable To Answer (8/20/2024)    Housing Stability Vital Sign     Unable to Pay for Housing in the Last Year: Patient unable to answer     Homeless in the Last Year: Patient unable to answer           Current Outpatient Medications   Medication Instructions    ascorbic Acid (VITAMIN C) 500 mg, 2 times daily, Per PEG tube    busPIRone (BUSPAR) 5 mg, Per G Tube, 3 times daily    cholestyramine (QUESTRAN) 4 gram packet 4 g, Daily, Via PEG tube    cholestyramine-aspartame (QUESTRAN LIGHT) 4 gram PwPk 4 g, Per G Tube, 2 times daily    diltiaZEM (CARDIZEM) 60 mg, Per G Tube, Every 6 hours    ferrous sulfate 300 mg, Oral, Daily    finasteride (PROSCAR) 5 mg, Oral, Daily    furosemide (LASIX) 80 mg, Per G Tube, As needed (PRN)    L. acidophilus/L.bulgaricus (FLORANEX ORAL) 1 packet, PEG Tube, Daily    levetiracetam 1,500 mg, Per G Tube, 2 times daily, Nursing home reports medication hold by MD until level redraw on Monday.    LIPITOR 10 mg, Per G Tube, Daily    metoclopramide HCl (REGLAN) 10 mg, Per G Tube, 3 times daily before meals    metoprolol tartrate (LOPRESSOR) 100 mg, Per G Tube, 2 times daily    miconazole NITRATE 2 % (MICOTIN) 2 % top powder Topical (Top), 2 times daily    multivitamin (THERAGRAN) per tablet 1 tablet, Per G Tube, Daily    pantoprazole (PROTONIX) 40 mg, Intravenous, 2 times daily    polyethylene glycol (GLYCOLAX) 17 g, Oral, 2 times daily PRN    protein supplement (PROMOD PROTEIN ORAL) 30 mLs, Per G Tube, Daily    QUEtiapine (SEROQUEL) 25 mg, Per G Tube, 2 times daily    scopolamine (TRANSDERM-SCOP) 1.3-1.5 mg (1 mg over 3 days) 1 patch, Transdermal, Every 3 days    sucralfate (CARAFATE) 1 g, Per G Tube, Before meals & nightly    tamsulosin (FLOMAX) 0.4 mg, Oral, Nightly    venlafaxine 75 mg TR24 1 tablet, Per G Tube, 2 times daily    XARELTO 20 mg Tab 1  tablet, Per G Tube, Nightly       Current Inpatient Medications   amiodarone in dextrose  150 mg Intravenous Once    [START ON 10/21/2024] atorvastatin  10 mg Per G Tube Daily    ceFEPime IV (PEDS and ADULTS)  2 g Intravenous Q8H    [START ON 10/21/2024] diltiaZEM  60 mg Per G Tube Q6H    [START ON 10/22/2024] mupirocin   Nasal BID    [START ON 10/21/2024] pantoprazole  40 mg Intravenous Daily    rivaroxaban  20 mg Per G Tube Nightly       Current Intravenous Infusions   amiodarone in dextrose 5%  1 mg/min Intravenous Continuous        [START ON 10/21/2024] amiodarone in dextrose 5%  0.5 mg/min Intravenous Continuous        dilTIAZem  0-15 mg/hr Intravenous Continuous 12.5 mL/hr at 10/20/24 2042 12.5 mg/hr at 10/20/24 2042    lactated ringers   Intravenous Continuous 100 mL/hr at 10/20/24 2038 New Bag at 10/20/24 2038         Review of Systems   Unable to perform ROS: Mental status change          Objective:       Intake/Output Summary (Last 24 hours) at 10/20/2024 2125  Last data filed at 10/20/2024 2042  Gross per 24 hour   Intake 32.63 ml   Output 475 ml   Net -442.37 ml         Vital Signs (Most Recent):  Temp: 99.3 °F (37.4 °C) (10/20/24 2037)  Pulse: (!) 135 (10/20/24 2100)  Resp: (!) 31 (10/20/24 2100)  BP: (!) 82/67 (10/20/24 2045)  SpO2: 100 % (10/20/24 2100)  Body mass index is 20.67 kg/m².  Weight: 63.5 kg (140 lb) Vital Signs (24h Range):  Temp:  [99.3 °F (37.4 °C)-103 °F (39.4 °C)] 99.3 °F (37.4 °C)  Pulse:  [112-193] 135  Resp:  [17-39] 31  SpO2:  [72 %-100 %] 100 %  BP: ()/(59-94) 82/67     Physical Exam  Constitutional:       General: He is not in acute distress.     Appearance: He is ill-appearing.      Comments: Thin, Chronically ill-appearing   HENT:      Mouth/Throat:      Mouth: Mucous membranes are dry.   Cardiovascular:      Rate and Rhythm: Tachycardia present.   Abdominal:      General: There is no distension.      Palpations: Abdomen is soft.      Comments: PEG tube in place     Musculoskeletal:      Comments: LUE and LLE in contracture    Skin:     General: Skin is warm and dry.      Comments: Large sacral pressure ulcer. BLE with scattered superficial abrasions.    Neurological:      Comments: Non-sedated. Nonverbal at baseline. No spontaneous eye opening. Corneal reflex intact. Does not follow commands. Winces and withdraws to painful stimuli in RUE and RLE.           Lines/Drains/Airways       Peripherally Inserted Central Catheter Line  Duration             PICC Triple Lumen 10/20/24 1530 <1 day              Drain  Duration                  Gastrostomy/Enterostomy 01/02/24 1230  days         Urethral Catheter 10/20/24 1535 <1 day              Airway  Duration             Adult Surgical Airway 08/19/24 0120 Shiley Extra Large Cuffed Distal 6.0/ 75mm 62 days              Peripheral Intravenous Line  Duration                  Midline Catheter - Single Lumen 10/20/24 1530 Right <1 day         Peripheral IV - Single Lumen 10/20/24 1600 18 G Anterior;Distal;Left Upper Arm <1 day         Peripheral IV - Single Lumen 10/20/24 1704 22 G Left;Posterior Hand <1 day                    Significant Labs:    Lab Results   Component Value Date    WBC 16.52 (H) 10/20/2024    WBC 16.52 10/20/2024    HGB 11.5 (L) 10/20/2024    HCT 37.1 (L) 10/20/2024    MCV 84.7 10/20/2024     10/20/2024           BMP  Lab Results   Component Value Date     (H) 10/20/2024    K 5.1 10/20/2024    CO2 19 (L) 10/20/2024    BUN 45.3 (H) 10/20/2024    CREATININE 1.48 (H) 10/20/2024    CALCIUM 8.4 (L) 10/20/2024    AGAP 13.0 10/20/2024    EGFRNONAA 61 04/23/2022         ABG  Recent Labs   Lab 10/20/24  2101   PH 7.520*   PO2 155.0*   PCO2 25.0*   HCO3 20.4*   POCBASEDEF -1.10       Mechanical Ventilation Support:  Vent Mode: A/C (10/20/24 1918)  Ventilator Initiated: Yes (10/20/24 1546)  Set Rate: 24 BPM (10/20/24 1918)  Vt Set: 500 mL (10/20/24 1918)  PEEP/CPAP: 5 cmH20 (10/20/24 1918)  Oxygen  Concentration (%): 40 (10/20/24 2037)  Peak Airway Pressure: 15 cmH20 (10/20/24 1918)  Total Ve: 10.4 L/m (10/20/24 1918)  F/VT Ratio<105 (RSBI): (!) 70.33 (10/20/24 1805)      Significant Imaging:  I have reviewed the pertinent imaging within the past 24 hours.        Assessment/Plan:     Assessment  Severe sepsis   UTI  Afib with RVR  Hypernatremia   Sacral wound      Plan  Admitted to ICU   On mechanical ventilation via trach   Received 3L NS in ED. Given Na of 155, will switch to LR @ 100cc/hr   Blood cx, urine cx, and respiratory cx ordered.  Continue on Vancomycin and Cefepime initiated in ED given recent urine cx on 10/16/24.   Arrives to ICU on Diltiazem. BP now 80s/60s with HR remaining in 140s. No response to IVF. Initiate amiodarone. Restart home Cardizem via PEG. CIS consulted for further recommendations.   Wound care consulted for pressure ulcer of the sacrum     DVT Prophylaxis: SCDs, continue home Xarelto   GI Prophylaxis: PPI     32 minutes of critical care was time spent personally by me on the following activities: development of treatment plan with patient or surrogate and bedside caregivers, discussions with consultants, evaluation of patient's response to treatment, examination of patient, ordering and performing treatments and interventions, ordering and review of laboratory studies, ordering and review of radiographic studies, pulse oximetry, re-evaluation of patient's condition.  This critical care time did not overlap with that of any other provider or involve time for any procedures.     Kadie Rojas DO  Pulmonary Critical Care Medicine  [facility] - Emergency Dept  DOS: 10/20/2024

## 2024-10-20 NOTE — PROGRESS NOTES
"Pharmacokinetic Initial Assessment: IV Vancomycin    Assessment/Plan:    Initiate intravenous vancomycin with loading dose of 1500 mg once with subsequent doses when random concentrations are less than 20 mcg/mL  Desired empiric serum trough concentration is 15 to 20 mcg/mL  Draw vancomycin random level on 10/21/24 at 1600.  Pharmacy will continue to follow and monitor vancomycin.      Please contact pharmacy at extension [phone] with any questions regarding this assessment.     Thank you for the consult,   Sabina Oscar       Patient brief summary:  Delio Daniel Jr. is a 69 y.o. male initiated on antimicrobial therapy with IV Vancomycin for treatment of suspected sepsis    Drug Allergies:   Review of patient's allergies indicates:  No Known Allergies    Actual Body Weight:   63.5 kg    Renal Function:   Estimated Creatinine Clearance: 42.9 mL/min (A) (based on SCr of 1.48 mg/dL (H)).,     Dialysis Method (if applicable):  N/A    CBC (last 72 hours):  Recent Labs   Lab Result Units 10/20/24  1603   WBC x10(3)/mcL 16.52*   Hgb g/dL 11.5*   Hct % 37.1*   Platelet x10(3)/mcL 253       Metabolic Panel (last 72 hours):  Recent Labs   Lab Result Units 10/20/24  1603   Sodium mmol/L 155*   Potassium mmol/L 5.1   Chloride mmol/L 123*   CO2 mmol/L 19*   Glucose mg/dL 157*   Blood Urea Nitrogen mg/dL 45.3*   Creatinine mg/dL 1.48*   Albumin g/dL 2.6*   Bilirubin Total mg/dL 0.8   ALP unit/L 105   AST unit/L 18   ALT unit/L 17   Magnesium Level mg/dL 2.60       Drug levels (last 3 results):  No results for input(s): "VANCOMYCINRA", "VANCORANDOM", "VANCOMYCINPE", "VANCOPEAK", "VANCOMYCINTR", "VANCOTROUGH" in the last 72 hours.    Microbiologic Results:  Microbiology Results (last 7 days)       Procedure Component Value Units Date/Time    Blood culture #2 **CANNOT BE ORDERED STAT** [[id number]] Collected: 10/20/24 1603    Order Status: Resulted Specimen: Blood Updated: 10/20/24 1623    Blood culture #1 **CANNOT BE ORDERED STAT** " [[id number]] Collected: 10/20/24 1601    Order Status: Resulted Specimen: Blood Updated: 10/20/24 1619

## 2024-10-20 NOTE — Clinical Note
Diagnosis: Sepsis [513760]   Future Attending Provider: PHILLIP CALIX [[id number]]   Admit to which facility:: [facility] [[id number]]   Reason for IP Medical Treatment  (Clinical interventions that can only be accomplished in the IP setting? ) :: IVF, abx   Plans for Post-Acute care--if anticipated (pick the single best option):: A. No post acute care anticipated at this time   Special Needs:: No Special Needs [1]

## 2024-10-20 NOTE — ED PROVIDER NOTES
Encounter Date: 10/20/2024    SCRIBE #1 NOTE: I, Mayelin Galvan am scribing for, and in the presence of,  Robin Diego IV, MD. I have scribed the following portions of the note -       History     Chief Complaint   Patient presents with    Tachycardia     Patient presents via ems from [place] with hematuria and uti on rocephin, possible fever. Tachycardic on arrival in 170s. Hx of afib. Chronic underwood in place, patient on trach. Nonverbal at baseline.    Hematuria     The patient is a 69 year old male with hx of A Fib, BPH, HTN, HLD, CAD, MI, CVA tach and peg dependent, Diverticulitis, cardiac arrest, and steatosis of liver presenting to the ED due to tachycardia and ams. EMS reports that the patient has been compliant to rocephin 4 out of the 7 days. EMS states that the patient baseline is aphasic but the patient is normally more alert. EMS states that the patient blood pressure en route was 112/82.    The history is provided by medical records and the EMS personnel. No  was used.     Review of patient's allergies indicates:  No Known Allergies  Past Medical History:   Diagnosis Date    Arthritis     Atrial fibrillation     BPH (benign prostatic hyperplasia)     Cardiac arrest     Coronary artery disease     Cyst, kidney, acquired     Diverticulosis     Hyperlipidemia     Hypertension     MI (myocardial infarction)     Obesity     Steatosis of liver     Stroke      Past Surgical History:   Procedure Laterality Date    A-V CARDIAC PACEMAKER INSERTION Right     CARDIAC CATHETERIZATION      COLONOSCOPY W/ BIOPSIES      CRANIECTOMY Right 12/20/2023    Procedure: CRANIECTOMY;  Surgeon: Artem Can MD;  Location: [facility] OR;  Service: Neurosurgery;  Laterality: Right;    ESOPHAGOGASTRODUODENOSCOPY W/ PEG N/A 1/2/2024    Procedure: PEG;  Surgeon: Tani Day MD;  Location: [facility] ENDOSCOPY;  Service: Gastroenterology;  Laterality: N/A;    excision of colon      TRACHEOSTOMY N/A 12/29/2023     Procedure: CREATION, TRACHEOSTOMY;  Surgeon: Patricia Winslow MD;  Location: [facility] OR;  Service: ENT;  Laterality: N/A;  REQ 1130 //  NEEDS 2 SCRUBS     Family History   Problem Relation Name Age of Onset    Hypertension Mother      Hypertension Father      Hypertension Sister       Social History     Tobacco Use    Smoking status: Never    Smokeless tobacco: Never   Substance Use Topics    Alcohol use: Not Currently    Drug use: Not Currently     Review of Systems   Unable to perform ROS: Patient nonverbal       Physical Exam     Initial Vitals   BP Pulse Resp Temp SpO2   10/20/24 1535 10/20/24 1535 10/20/24 1535 10/20/24 1610 10/20/24 1535   109/79 (!) 193 (!) 29 (!) 103 °F (39.4 °C) 98 %      MAP       --                Physical Exam    Nursing note and vitals reviewed.  Constitutional: He appears lethargic. He is not diaphoretic. He appears ill. No distress.   Pt presents lethargic but arousable.      HENT:   Head: Normocephalic and atraumatic.   Neck: Neck supple. A tracheostomy is present - with a tracheostomy tube.   Site is clean, dry, and intact.    Normal range of motion.  Cardiovascular:  An irregular rhythm present.   Tachycardia present.         Pulmonary/Chest: Breath sounds normal. Tachypnea noted. No respiratory distress.   Abdominal: Abdomen is soft. He exhibits no distension. There is no abdominal tenderness.   Peg tube present; Site is clean, dry, and intact.      Musculoskeletal:         General: No edema.      Cervical back: Normal range of motion and neck supple.     Neurological: He appears lethargic. No cranial nerve deficit or sensory deficit.   Aphasic at baseline.    Skin: Skin is warm. Capillary refill takes less than 2 seconds.         ED Course   Critical Care    Date/Time: 10/20/2024 4:48 PM    Performed by: Robin Diego IV, MD  Authorized by: Robin Diego IV, MD  Total critical care time (exclusive of procedural time) : 45 minutes  Critical care time was exclusive of separately  billable procedures and treating other patients.  Critical care was necessary to treat or prevent imminent or life-threatening deterioration of the following conditions: sepsis.  Critical care was time spent personally by me on the following activities: blood draw for specimens, development of treatment plan with patient or surrogate, discussions with consultants, discussions with primary provider, evaluation of patient's response to treatment, examination of patient, obtaining history from patient or surrogate, ordering and performing treatments and interventions, ordering and review of laboratory studies, ordering and review of radiographic studies, pulse oximetry, re-evaluation of patient's condition, vascular access procedures, ventilator management and review of old charts.        Labs Reviewed   COMPREHENSIVE METABOLIC PANEL - Abnormal       Result Value    Sodium 155 (*)     Potassium 5.1      Chloride 123 (*)     CO2 19 (*)     Glucose 157 (*)     Blood Urea Nitrogen 45.3 (*)     Creatinine 1.48 (*)     Calcium 8.4 (*)     Protein Total 7.3      Albumin 2.6 (*)     Globulin 4.7 (*)     Albumin/Globulin Ratio 0.6 (*)     Bilirubin Total 0.8            ALT 17      AST 18      eGFR 51      Anion Gap 13.0      BUN/Creatinine Ratio 31     LACTIC ACID, PLASMA - Abnormal    Lactic Acid Level 3.3 (*)    URINALYSIS, REFLEX TO URINE CULTURE - Abnormal    Color, UA Yellow      Appearance, UA Turbid (*)     Specific Gravity, UA 1.025      pH, UA 6.0      Protein, UA 3+ (*)     Glucose, UA Normal      Ketones, UA Negative      Blood, UA 3+ (*)     Bilirubin, UA Negative      Urobilinogen, UA Normal      Nitrites, UA Negative      Leukocyte Esterase,  (*)     RBC, UA >100 (*)     WBC, UA 21-50 (*)     Bacteria, UA Few (*)     Squamous Epithelial Cells, UA None Seen      Mucous, UA Moderate (*)    TROPONIN I - Abnormal    Troponin-I 0.118 (*)    CBC WITH DIFFERENTIAL - Abnormal    WBC 16.52 (*)     RBC 4.38  (*)     Hgb 11.5 (*)     Hct 37.1 (*)     MCV 84.7      MCH 26.3 (*)     MCHC 31.0 (*)     RDW 17.5 (*)     Platelet 253      MPV 11.7 (*)     NRBC% 0.1     MANUAL DIFFERENTIAL - Abnormal    WBC 16.52      Neutrophils % 81      Lymphs % 16      Monocytes % 1      Basophils % 3      Neutrophils Abs 13.3812 (*)     Lymphs Abs 2.6432      Monocytes Abs 0.1652      Basophils Abs 0.4956 (*)     Platelets Normal      RBC Morph Normal     COVID/RSV/FLU A&B PCR - Normal    Influenza A PCR Not Detected      Influenza B PCR Not Detected      Respiratory Syncytial Virus PCR Not Detected      SARS-CoV-2 PCR Not Detected      Narrative:     The Xpert Xpress SARS-CoV-2/FLU/RSV plus is a rapid, multiplexed real-time PCR test intended for the simultaneous qualitative detection and differentiation of SARS-CoV-2, Influenza A, Influenza B, and respiratory syncytial virus (RSV) viral RNA in either nasopharyngeal swab or nasal swab specimens.         MAGNESIUM - Normal    Magnesium Level 2.60     TSH - Normal    TSH 2.601     MRSA PCR - Normal    MRSA PCR Screen Not Detected      Narrative:     The Xpert MRSA Assay utilizes automated real-time polymerase chain reaction (PCR) to detect MRSA DNA.  A positive test result does not necessarily indicate the presence of viable organism.  It is however, presumptive for the presence of MRSA.   LACTIC ACID, PLASMA - Normal    Lactic Acid Level 2.1     BLOOD CULTURE [facility]   BLOOD CULTURE [facility]   CULTURE, URINE   RESPIRATORY CULTURE ([facility])   CBC W/ AUTO DIFFERENTIAL    Narrative:     The following orders were created for panel order CBC auto differential.  Procedure                               Abnormality         Status                     ---------                               -----------         ------                     CBC with Differential[[id number]]       Abnormal            Final result               Manual Differential[[id number]]         Abnormal            Final result                  Please view results for these tests on the individual orders.   TROPONIN I     EKG Readings: (Independently Interpreted)   Initial Reading: No STEMI. Heart Rate: 191. Ectopy: No Ectopy. Conduction: Normal. T Waves: Normal. Clinical Impression: Atrial Fibrillation with RVR   1535. Significant artifact limiting the ST segments.        Imaging Results              X-Ray Chest AP Portable (Final result)  Result time 10/20/24 19:36:31      Final result by Sami Taylor MD (10/20/24 19:36:31)                   Impression:      No acute cardiopulmonary process identified.      Electronically signed by: Sami Taylor  Date:    10/20/2024  Time:    19:36               Narrative:    EXAMINATION:  XR CHEST AP PORTABLE    CLINICAL HISTORY:  Sepsis, unspecified organism    TECHNIQUE:  One view    COMPARISON:  August 21, 2020.    FINDINGS:  Cardiopericardial silhouette appearance is similar.  Tracheostomy cannula is in similar location.  Right chest implanted cardiac device electrodes terminate within the right atrium and right ventricle.  No acute dense focal or segmental consolidation, congestive process, pleural effusions or pneumothorax.                                       Medications   vancomycin - pharmacy to dose (has no administration in time range)   mupirocin 2 % ointment (has no administration in time range)   ceFEPIme injection 2 g (2 g Intravenous Given 10/20/24 1620)   diltiaZEM 125 mg in dextrose 5 % 125 mL IVPB (ready to mix) (titrating) (10 mg/hr Intravenous Rate/Dose Change 10/20/24 1817)   sodium chloride 0.9% flush 10 mL (has no administration in time range)   naloxone 0.4 mg/mL injection 0.02 mg (has no administration in time range)   glucagon (human recombinant) injection 1 mg (has no administration in time range)   dextrose 10% bolus 125 mL 125 mL (has no administration in time range)   dextrose 10% bolus 250 mL 250 mL (has no administration in time range)   acetaminophen tablet 650 mg (has no  administration in time range)   hydrALAZINE injection 10 mg (has no administration in time range)   rivaroxaban tablet 20 mg (has no administration in time range)   atorvastatin tablet 10 mg (has no administration in time range)   lactated ringers infusion (has no administration in time range)   sodium chloride 0.9% bolus 1,905 mL 1,905 mL (0 mLs Intravenous Stopped 10/20/24 1645)   vancomycin 1,500 mg in D5W 250 mL IVPB (admixture device) (0 mg Intravenous Stopped 10/20/24 1835)   acetaminophen 1,000 mg/100 mL (10 mg/mL) injection 1,000 mg (0 mg Intravenous Stopped 10/20/24 1637)   diltiaZEM injection 10 mg (10 mg Intravenous Given 10/20/24 1646)   diltiaZEM injection 10 mg (10 mg Intravenous Given 10/20/24 1730)   sodium chloride 0.9% bolus 1,000 mL 1,000 mL (0 mLs Intravenous Stopped 10/20/24 1941)     Medical Decision Making  69-year-old male with a history of stroke trach and PEG tube dependent full code from the nursing home septic heart rate in the 190s febrile being treated for UTI which grew back multiple organisms multidrug resistant but susceptible to cefepime  Exam as above treating with broad-spectrum antibiotics 30 cc/kilos fluid resuscitation using previous cultures to guide antibiotic therapy obtain workup anticipate admission is stable at this time    Differential diagnosis (including but not limited to):   Judging by the patient's chief complaint and pertinent history, the patient has the following possible differential diagnoses, including but not limited to the following.  Some of these are deemed to be lower likelihood and some more likely based on my physical exam and history combined with possible lab work and/or imaging studies.   Please see the pertinent studies, and refer to the HPI.  Some of these diagnoses will take further evaluation to fully rule out, perhaps as an outpatient and the patient was encouraged to follow up when discharged for more comprehensive evaluation.    Viral  syndrome, COVID, flu, otitis media UTI, intraabdominal infection, bacterial pharyngitis, pneumonia, sepsis, pyelonephritis, cellulitis, abscess, ENT infection, meningitis, encephalitis, PE, environmental exposure, medication side effect, drug use, NMS, serotonin syndrome, head injury      Problems Addressed:  Fever, unspecified fever cause: acute illness or injury that poses a threat to life or bodily functions  Sepsis: acute illness or injury that poses a threat to life or bodily functions  Tachycardia, unspecified: acute illness or injury that poses a threat to life or bodily functions  Urinary tract infection without hematuria, site unspecified: acute illness or injury that poses a threat to life or bodily functions    Amount and/or Complexity of Data Reviewed  Independent Historian: EMS     Details: EMS reports that the patient has been compliant to rocephin 4 out of the 7 days and states that the patient had to be placed on a vent due to heart rate. EMS states that the patient baseline is aphasic but the patient is normally more alert. EMS states that the patient blood pressure en route was 112/82.  External Data Reviewed: notes.     Details: Nursing home paperwork reviewed - full code   Labs: ordered.  Radiology: ordered and independent interpretation performed.     Details: CXR - no obvious infiltrates, consolidations, pleural effusions, or pneumothorax.      ECG/medicine tests: ordered and independent interpretation performed.     Details: Initial Reading: No STEMI. Heart Rate: 191. Ectopy: No Ectopy. Conduction: Normal. T Waves: Normal. Clinical Impression: Atrial Fibrillation with RVR   1535. Significant artifact limiting the ST segments.    Discussion of management or test interpretation with external provider(s): ICU - will admit      Risk  Prescription drug management.  Decision regarding hospitalization.    Critical Care  Total time providing critical care: 45 minutes            Scribe Attestation:    Scribe #1: I performed the above scribed service and the documentation accurately describes the services I performed. I attest to the accuracy of the note.    Attending Attestation:           Physician Attestation for Scribe:  Physician Attestation Statement for Scribe #1: I, Robin Diego IV, MD, reviewed documentation, as scribed by Mayelin Galvan in my presence, and it is both accurate and complete.             ED Course as of 10/20/24 2038   Sun Oct 20, 2024   1540 Will treat with sepsis IVF 30 cc/kg, broad spectrum abx    Sepsis Perfusion Assessment: I attest a sepsis perfusion exam was performed within 6 hours of sepsis, severe sepsis, or septic shock presentation, following fluid resuscitation.     [AC]   1617 69-year-old male with a history of stroke trach and PEG tube dependent full code from the nursing home septic heart rate in the 190s febrile being treated for UTI which grew back multiple organisms multidrug resistant but susceptible to cefepime  Exam as above treating with broad-spectrum antibiotics 30 cc/kilos fluid resuscitation using previous cultures to guide antibiotic therapy obtain workup anticipate admission is stable at this time    Differential diagnosis (including but not limited to):   Judging by the patient's chief complaint and pertinent history, the patient has the following possible differential diagnoses, including but not limited to the following.  Some of these are deemed to be lower likelihood and some more likely based on my physical exam and history combined with possible lab work and/or imaging studies.   Please see the pertinent studies, and refer to the HPI.  Some of these diagnoses will take further evaluation to fully rule out, perhaps as an outpatient and the patient was encouraged to follow up when discharged for more comprehensive evaluation.    Viral syndrome, COVID, flu, otitis media UTI, intraabdominal infection, bacterial pharyngitis, pneumonia, sepsis, pyelonephritis,  cellulitis, abscess, ENT infection, meningitis, encephalitis, PE, environmental exposure, medication side effect, drug use, NMS, serotonin syndrome, head injury   [AC]   1827 Paged ICU. [AS]      ED Course User Index  [AC] Robin Diego IV, MD  [AS] Mayelin Galvan                           Clinical Impression:  Final diagnoses:  [A41.9] Sepsis  [R50.9] Fever, unspecified fever cause (Primary)  [R00.0] Tachycardia, unspecified  [N39.0] Urinary tract infection without hematuria, site unspecified          ED Disposition Condition    Admit Stable                Robin Diego IV, MD  10/20/24 2046

## 2024-10-21 PROBLEM — R65.20 SEVERE SEPSIS: Status: RESOLVED | Noted: 2024-07-19 | Resolved: 2024-10-21

## 2024-10-21 PROBLEM — A41.9 SEVERE SEPSIS: Status: RESOLVED | Noted: 2024-07-19 | Resolved: 2024-10-21

## 2024-10-21 LAB
ABS NEUT (OLG): 11.29 X10(3)/MCL (ref 2.1–9.2)
ALBUMIN SERPL-MCNC: 2.1 G/DL (ref 3.4–4.8)
ALBUMIN/GLOB SERPL: 0.5 RATIO (ref 1.1–2)
ALLENS TEST BLOOD GAS (OHS): YES
ALP SERPL-CCNC: 90 UNIT/L (ref 40–150)
ALT SERPL-CCNC: 14 UNIT/L (ref 0–55)
ANION GAP SERPL CALC-SCNC: 9 MEQ/L
ANISOCYTOSIS BLD QL SMEAR: ABNORMAL
AST SERPL-CCNC: 13 UNIT/L (ref 5–34)
BASE EXCESS BLD CALC-SCNC: 1.3 MMOL/L
BILIRUB SERPL-MCNC: 0.6 MG/DL
BLOOD GAS SAMPLE TYPE (OHS): ABNORMAL
BUN SERPL-MCNC: 31.7 MG/DL (ref 8.4–25.7)
CA-I BLD-SCNC: 1.11 MMOL/L (ref 1.12–1.23)
CALCIUM SERPL-MCNC: 8.2 MG/DL (ref 8.8–10)
CHLORIDE SERPL-SCNC: 125 MMOL/L (ref 98–107)
CO2 BLDA-SCNC: 26 MMOL/L
CO2 SERPL-SCNC: 20 MMOL/L (ref 23–31)
CREAT SERPL-MCNC: 1.01 MG/DL (ref 0.72–1.25)
CREAT/UREA NIT SERPL: 31
DRAWN BY BLOOD GAS (OHS): ABNORMAL
ERYTHROCYTE [DISTWIDTH] IN BLOOD BY AUTOMATED COUNT: 17.2 % (ref 11.5–17)
GFR SERPLBLD CREATININE-BSD FMLA CKD-EPI: >60 ML/MIN/1.73/M2
GLOBULIN SER-MCNC: 4.3 GM/DL (ref 2.4–3.5)
GLUCOSE SERPL-MCNC: 121 MG/DL (ref 82–115)
HCO3 BLDA-SCNC: 24.9 MMOL/L (ref 22–26)
HCT VFR BLD AUTO: 28.2 % (ref 42–52)
HGB BLD-MCNC: 8.6 G/DL (ref 14–18)
INHALED O2 CONCENTRATION: 40 %
INSTRUMENT WBC (OLG): 12.69 X10(3)/MCL
LACTATE SERPL-SCNC: 1.8 MMOL/L (ref 0.5–2.2)
LYMPHOCYTES NFR BLD MANUAL: 1.4 X10(3)/MCL
LYMPHOCYTES NFR BLD MANUAL: 11 %
MAGNESIUM SERPL-MCNC: 2.4 MG/DL (ref 1.6–2.6)
MCH RBC QN AUTO: 26.1 PG (ref 27–31)
MCHC RBC AUTO-ENTMCNC: 30.5 G/DL (ref 33–36)
MCV RBC AUTO: 85.5 FL (ref 80–94)
MECH RR (OHS): 20 B/MIN
MICROCYTES BLD QL SMEAR: ABNORMAL
MODE (OHS): AC
NEUTROPHILS NFR BLD MANUAL: 89 %
NRBC BLD AUTO-RTO: 0 %
OHS QRS DURATION: 84 MS
OHS QTC CALCULATION: 481 MS
OVALOCYTES (OLG): ABNORMAL
OXYGEN DEVICE BLOOD GAS (OHS): ABNORMAL
PCO2 BLDA: 35 MMHG (ref 35–45)
PEEP RESPIRATORY: 5 CMH2O
PH BLDA: 7.46 [PH] (ref 7.35–7.45)
PLATELET # BLD AUTO: 198 X10(3)/MCL (ref 130–400)
PLATELET # BLD EST: NORMAL 10*3/UL
PMV BLD AUTO: 11.5 FL (ref 7.4–10.4)
PO2 BLDA: 63 MMHG (ref 80–100)
POIKILOCYTOSIS BLD QL SMEAR: ABNORMAL
POTASSIUM BLOOD GAS (OHS): 3.4 MMOL/L (ref 3.5–5)
POTASSIUM SERPL-SCNC: 3.7 MMOL/L (ref 3.5–5.1)
PROT SERPL-MCNC: 6.4 GM/DL (ref 5.8–7.6)
RBC # BLD AUTO: 3.3 X10(6)/MCL (ref 4.7–6.1)
RBC MORPH BLD: ABNORMAL
SAMPLE SITE BLOOD GAS (OHS): ABNORMAL
SAO2 % BLDA: 93 %
SODIUM BLOOD GAS (OHS): 153 MMOL/L (ref 137–145)
SODIUM SERPL-SCNC: 154 MMOL/L (ref 136–145)
SPONT+MECH VT ON VENT: 500 ML
WBC # BLD AUTO: 12.69 X10(3)/MCL (ref 4.5–11.5)

## 2024-10-21 PROCEDURE — 94003 VENT MGMT INPAT SUBQ DAY: CPT

## 2024-10-21 PROCEDURE — 25000003 PHARM REV CODE 250: Performed by: GENERAL PRACTICE

## 2024-10-21 PROCEDURE — 63600175 PHARM REV CODE 636 W HCPCS

## 2024-10-21 PROCEDURE — 27200966 HC CLOSED SUCTION SYSTEM

## 2024-10-21 PROCEDURE — 83735 ASSAY OF MAGNESIUM: CPT

## 2024-10-21 PROCEDURE — 36415 COLL VENOUS BLD VENIPUNCTURE: CPT

## 2024-10-21 PROCEDURE — 93010 ELECTROCARDIOGRAM REPORT: CPT | Mod: ,,, | Performed by: STUDENT IN AN ORGANIZED HEALTH CARE EDUCATION/TRAINING PROGRAM

## 2024-10-21 PROCEDURE — 63600175 PHARM REV CODE 636 W HCPCS: Performed by: NURSE PRACTITIONER

## 2024-10-21 PROCEDURE — 94761 N-INVAS EAR/PLS OXIMETRY MLT: CPT | Mod: XB

## 2024-10-21 PROCEDURE — 80053 COMPREHEN METABOLIC PANEL: CPT

## 2024-10-21 PROCEDURE — 93005 ELECTROCARDIOGRAM TRACING: CPT

## 2024-10-21 PROCEDURE — 25500020 PHARM REV CODE 255: Performed by: INTERNAL MEDICINE

## 2024-10-21 PROCEDURE — 27100171 HC OXYGEN HIGH FLOW UP TO 24 HOURS

## 2024-10-21 PROCEDURE — 25000003 PHARM REV CODE 250: Performed by: STUDENT IN AN ORGANIZED HEALTH CARE EDUCATION/TRAINING PROGRAM

## 2024-10-21 PROCEDURE — 63600175 PHARM REV CODE 636 W HCPCS: Performed by: STUDENT IN AN ORGANIZED HEALTH CARE EDUCATION/TRAINING PROGRAM

## 2024-10-21 PROCEDURE — 85025 COMPLETE CBC W/AUTO DIFF WBC: CPT

## 2024-10-21 PROCEDURE — 99900035 HC TECH TIME PER 15 MIN (STAT)

## 2024-10-21 PROCEDURE — 25000003 PHARM REV CODE 250: Performed by: NURSE PRACTITIONER

## 2024-10-21 PROCEDURE — 94760 N-INVAS EAR/PLS OXIMETRY 1: CPT

## 2024-10-21 PROCEDURE — 25000003 PHARM REV CODE 250

## 2024-10-21 PROCEDURE — 99900026 HC AIRWAY MAINTENANCE (STAT)

## 2024-10-21 PROCEDURE — 36600 WITHDRAWAL OF ARTERIAL BLOOD: CPT

## 2024-10-21 PROCEDURE — 99900031 HC PATIENT EDUCATION (STAT)

## 2024-10-21 PROCEDURE — 20000000 HC ICU ROOM

## 2024-10-21 PROCEDURE — 83605 ASSAY OF LACTIC ACID: CPT

## 2024-10-21 PROCEDURE — 99900022

## 2024-10-21 PROCEDURE — 99223 1ST HOSP IP/OBS HIGH 75: CPT | Mod: ,,, | Performed by: GENERAL PRACTICE

## 2024-10-21 PROCEDURE — 82803 BLOOD GASES ANY COMBINATION: CPT

## 2024-10-21 RX ORDER — DIGOXIN 0.25 MG/ML
250 INJECTION INTRAMUSCULAR; INTRAVENOUS ONCE
Status: COMPLETED | OUTPATIENT
Start: 2024-10-21 | End: 2024-10-21

## 2024-10-21 RX ORDER — QUETIAPINE FUMARATE 25 MG/1
25 TABLET, FILM COATED ORAL 2 TIMES DAILY
Status: DISCONTINUED | OUTPATIENT
Start: 2024-10-21 | End: 2024-10-27

## 2024-10-21 RX ORDER — FENTANYL CITRATE 50 UG/ML
25 INJECTION, SOLUTION INTRAMUSCULAR; INTRAVENOUS ONCE
Status: COMPLETED | OUTPATIENT
Start: 2024-10-21 | End: 2024-10-21

## 2024-10-21 RX ORDER — LINEZOLID 600 MG/1
600 TABLET, FILM COATED ORAL EVERY 12 HOURS
Status: DISCONTINUED | OUTPATIENT
Start: 2024-10-21 | End: 2024-10-23

## 2024-10-21 RX ORDER — METOPROLOL TARTRATE 1 MG/ML
5 INJECTION, SOLUTION INTRAVENOUS EVERY 5 MIN PRN
Status: COMPLETED | OUTPATIENT
Start: 2024-10-21 | End: 2024-10-30

## 2024-10-21 RX ORDER — METOPROLOL TARTRATE 25 MG/1
25 TABLET, FILM COATED ORAL 3 TIMES DAILY
Status: DISCONTINUED | OUTPATIENT
Start: 2024-10-21 | End: 2024-10-29

## 2024-10-21 RX ORDER — DIGOXIN 0.25 MG/ML
500 INJECTION INTRAMUSCULAR; INTRAVENOUS ONCE
Status: COMPLETED | OUTPATIENT
Start: 2024-10-21 | End: 2024-10-21

## 2024-10-21 RX ORDER — GUAIFENESIN AND DEXTROMETHORPHAN HYDROBROMIDE 10; 100 MG/5ML; MG/5ML
5 SYRUP ORAL EVERY 4 HOURS PRN
Status: DISCONTINUED | OUTPATIENT
Start: 2024-10-21 | End: 2024-10-27

## 2024-10-21 RX ADMIN — AMIODARONE HYDROCHLORIDE 0.5 MG/MIN: 1.8 INJECTION, SOLUTION INTRAVENOUS at 03:10

## 2024-10-21 RX ADMIN — FENTANYL CITRATE 25 MCG: 50 INJECTION, SOLUTION INTRAMUSCULAR; INTRAVENOUS at 05:10

## 2024-10-21 RX ADMIN — CEFEPIME 2 G: 2 INJECTION, POWDER, FOR SOLUTION INTRAVENOUS at 12:10

## 2024-10-21 RX ADMIN — METOPROLOL TARTRATE 5 MG: 1 INJECTION, SOLUTION INTRAVENOUS at 04:10

## 2024-10-21 RX ADMIN — GUAIFENESIN AND DEXTROMETHORPHAN 5 ML: 100; 10 SYRUP ORAL at 12:10

## 2024-10-21 RX ADMIN — CEFEPIME 2 G: 2 INJECTION, POWDER, FOR SOLUTION INTRAVENOUS at 08:10

## 2024-10-21 RX ADMIN — METOPROLOL TARTRATE 25 MG: 25 TABLET, FILM COATED ORAL at 11:10

## 2024-10-21 RX ADMIN — METOPROLOL TARTRATE 25 MG: 25 TABLET, FILM COATED ORAL at 09:10

## 2024-10-21 RX ADMIN — QUETIAPINE FUMARATE 25 MG: 25 TABLET ORAL at 08:10

## 2024-10-21 RX ADMIN — ATORVASTATIN CALCIUM 10 MG: 10 TABLET, FILM COATED ORAL at 08:10

## 2024-10-21 RX ADMIN — IOHEXOL 100 ML: 350 INJECTION, SOLUTION INTRAVENOUS at 05:10

## 2024-10-21 RX ADMIN — CEFEPIME 2 G: 2 INJECTION, POWDER, FOR SOLUTION INTRAVENOUS at 03:10

## 2024-10-21 RX ADMIN — DILTIAZEM HYDROCHLORIDE 60 MG: 60 TABLET, FILM COATED ORAL at 12:10

## 2024-10-21 RX ADMIN — DIGOXIN 500 MCG: 0.25 INJECTION INTRAMUSCULAR; INTRAVENOUS at 11:10

## 2024-10-21 RX ADMIN — QUETIAPINE FUMARATE 25 MG: 25 TABLET ORAL at 09:10

## 2024-10-21 RX ADMIN — RIVAROXABAN 20 MG: 10 TABLET, FILM COATED ORAL at 09:10

## 2024-10-21 RX ADMIN — SODIUM CHLORIDE, POTASSIUM CHLORIDE, SODIUM LACTATE AND CALCIUM CHLORIDE 500 ML: 600; 310; 30; 20 INJECTION, SOLUTION INTRAVENOUS at 04:10

## 2024-10-21 RX ADMIN — LINEZOLID 600 MG: 600 TABLET, FILM COATED ORAL at 09:10

## 2024-10-21 RX ADMIN — DILTIAZEM HYDROCHLORIDE 60 MG: 60 TABLET, FILM COATED ORAL at 06:10

## 2024-10-21 RX ADMIN — DIGOXIN 250 MCG: 0.25 INJECTION INTRAMUSCULAR; INTRAVENOUS at 06:10

## 2024-10-21 RX ADMIN — GUAIFENESIN AND DEXTROMETHORPHAN 5 ML: 100; 10 SYRUP ORAL at 09:10

## 2024-10-21 RX ADMIN — DAPTOMYCIN 555 MG: 500 INJECTION, POWDER, LYOPHILIZED, FOR SOLUTION INTRAVENOUS at 03:10

## 2024-10-21 RX ADMIN — PANTOPRAZOLE SODIUM 40 MG: 40 INJECTION, POWDER, LYOPHILIZED, FOR SOLUTION INTRAVENOUS at 08:10

## 2024-10-21 RX ADMIN — METOPROLOL TARTRATE 25 MG: 25 TABLET, FILM COATED ORAL at 03:10

## 2024-10-21 RX ADMIN — METOPROLOL TARTRATE 5 MG: 1 INJECTION, SOLUTION INTRAVENOUS at 06:10

## 2024-10-21 NOTE — CARE UPDATE
Care Update    Called by Infectious Disease stewardship pharmacist that patient has VRE.  Plan to change vancomycin to daptomycin and consult Infectious Disease.

## 2024-10-21 NOTE — CONSULTS
Infectious Disease  Consult Note    Patient Name: Delio Daniel Jr.   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 On admission, noted to be hypotensive with Afib and RVR, recently had positive urine culture with Klebsiella and Proteus. He also has an advanced sacral ulcer. He was noted to have bacteremia with 2/4 of admission blood cultures being positive for Enterococcus faecium and ultimately noting it is VRE per BCID. ID consulted for assistance in management.     Bacteremia  VRE infection  CVA with residual cognitive deficits, motor deficits  Tracheostomy and PEG tube dependent  Sacral ulcer   Recurrent UTI  Pacemaker In place  History of V fib arrest    Recommendations:  -Discontinue Vancomycin  -He was transitioned to Daptomycin, would be concerned however about susceptibilities of Enterococcus to Daptomycin, asked micro lab to run S to Daptomycin  -continue Daptomycin and Cefepime  -Will add Linezolid while final S of the VRE is identified  -Likely source of infection Is the sacral ulcer rather than the urine given the niyah noted in the urine being discordant with the one in the blood  -Presence of the ICD is concerning in the setting of this bacteremia  -TTE  -Repeat blood cultures  -CT abdomen and pelvis with contrast to evaluate the sacral ulcer and  underneath it, will likely need debridement     Thank you for your consult. ID will continue following. Please contact us with any questions or concerns.    Antibiotics History:    Cefepime 10/20 - Present  Vancomycin 10/20  Daptomycin 10/21 - Present  Linezolid 10/21 - Present      Portions of this note dictated using EMR integrated voice recognition software, and may be subject to voice recognition errors not corrected at proofreading. Please contact writer for clarification if needed.    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 On admission, noted to be hypotensive with Afib and RVR, recently had positive urine culture with Klebsiella and Proteus. He also has an advanced sacral ulcer. He was noted to have bacteremia with 2/4 of admission blood cultures being positive for Enterococcus faecium and ultimately noting it is VRE per BCID. ID consulted for assistance in management.     Past Medical History:   Diagnosis Date    Arthritis     Atrial fibrillation     BPH (benign prostatic hyperplasia)     Cardiac arrest     Coronary artery disease     Cyst, kidney, acquired     Diverticulosis     Hyperlipidemia     Hypertension     MI (myocardial infarction)     Obesity     Steatosis of liver     Stroke         Past Surgical History:   Procedure Laterality Date    A-V CARDIAC PACEMAKER INSERTION Right     CARDIAC CATHETERIZATION      COLONOSCOPY W/ BIOPSIES      CRANIECTOMY Right 12/20/2023    Procedure: CRANIECTOMY;  Surgeon: Juan José  Artem LEUNG MD;  Location: [facility] OR;  Service: Neurosurgery;  Laterality: Right;    ESOPHAGOGASTRODUODENOSCOPY W/ PEG N/A 1/2/2024    Procedure: PEG;  Surgeon: Tani Day MD;  Location: [facility] ENDOSCOPY;  Service: Gastroenterology;  Laterality: N/A;    excision of colon      TRACHEOSTOMY N/A 12/29/2023    Procedure: CREATION, TRACHEOSTOMY;  Surgeon: Patricia Winslow MD;  Location: [facility] OR;  Service: ENT;  Laterality: N/A;  REQ 1130 //  NEEDS 2 SCRUBS       Review of patient's allergies indicates:  No Known Allergies     Family History       Problem Relation (Age of Onset)    Hypertension Mother, Father, Sister           .    Social History     Socioeconomic History    Marital status:     Number of children: 9   Occupational History    Occupation: retired   Tobacco Use    Smoking status: Never    Smokeless tobacco: Never   Substance and Sexual Activity    Alcohol use: Not Currently    Drug use: Not Currently    Sexual activity: Not Currently     Partners: Female     Social Drivers of Health     Financial Resource Strain: Patient Unable To Answer (10/21/2024)    Overall Financial Resource Strain (CARDIA)     Difficulty of Paying Living Expenses: Patient unable to answer   Food Insecurity: Patient Unable To Answer (10/21/2024)    Hunger Vital Sign     Worried About Running Out of Food in the Last Year: Patient unable to answer     Ran Out of Food in the Last Year: Patient unable to answer   Transportation Needs: Patient Unable To Answer (10/21/2024)    TRANSPORTATION NEEDS     Transportation : Patient unable to answer   Physical Activity: Sufficiently Active (8/5/2024)    Exercise Vital Sign     Days of Exercise per Week: 5 days     Minutes of Exercise per Session: 30 min   Stress: Patient Unable To Answer (10/21/2024)    [employer] [place] [employer] - Occupational Stress Questionnaire     Feeling of Stress : Patient unable to answer   Housing Stability: Patient Unable To Answer (10/21/2024)     Housing Stability Vital Sign     Unable to Pay for Housing in the Last Year: Patient unable to answer     Homeless in the Last Year: Patient unable to answer        Lines/Drains/Airways       Drain  Duration                  Gastrostomy/Enterostomy 01/02/24 1230  days         Urethral Catheter 10/20/24 2210 Silicone 16 Fr. <1 day              Airway  Duration             Adult Surgical Airway 08/19/24 0120 Shiley Extra Large Cuffed Distal 6.0/ 75mm 63 days              Peripheral Intravenous Line  Duration                  Midline Catheter - Single Lumen 10/20/24 1530 Right <1 day         Peripheral IV - Single Lumen 10/20/24 1600 18 G Anterior;Distal;Left Upper Arm <1 day                     Medication:  Medications Prior to Admission   Medication Sig    ascorbic Acid (VITAMIN C) 500 mg CpSR 500 mg 2 (two) times daily. Per PEG tube    busPIRone (BUSPAR) 5 MG Tab 5 mg by Per G Tube route 3 (three) times daily.    cholestyramine (QUESTRAN) 4 gram packet 4 g once daily. Via PEG tube    cholestyramine-aspartame (QUESTRAN LIGHT) 4 gram PwPk 1 packet (4 g total) by Per G Tube route 2 (two) times daily.    diltiaZEM (CARDIZEM) 60 MG tablet 60 mg by Per G Tube route every 6 (six) hours.    ferrous sulfate 300 mg (60 mg iron)/5 mL syrup Take 5 mLs (300 mg total) by mouth once daily.    finasteride (PROSCAR) 5 mg tablet Take 1 tablet (5 mg total) by mouth once daily.    furosemide (LASIX) 80 MG tablet 80 mg by Per G Tube route as needed (for Edema).    L. acidophilus/L.bulgaricus (FLORANEX ORAL) 1 packet by PEG Tube route Daily.    levetiracetam 500 mg/5 mL (5 mL) Soln 1,500 mg by Per G Tube route 2 (two) times daily. Nursing home reports medication hold by MD until level redraw on Monday.    LIPITOR 10 mg tablet 10 mg by Per G Tube route once daily.    metoclopramide HCl (REGLAN) 5 mg/5 mL Soln 10 mg by Per G Tube route 3 (three) times daily before meals.    metoprolol tartrate (LOPRESSOR) 100 MG tablet 100 mg by Per  G Tube route 2 (two) times daily.    miconazole NITRATE 2 % (MICOTIN) 2 % top powder Apply topically 2 (two) times daily.    multivitamin (THERAGRAN) per tablet 1 tablet by Per G Tube route once daily.    pantoprazole (PROTONIX) 40 mg injection Inject 40 mg into the vein 2 (two) times daily.    polyethylene glycol (GLYCOLAX) 17 gram PwPk Take 17 g by mouth 2 (two) times daily as needed for Constipation.    protein supplement (PROMOD PROTEIN ORAL) 30 mLs by Per G Tube route once daily.    QUEtiapine (SEROQUEL) 25 MG Tab 25 mg by Per G Tube route 2 (two) times daily.    scopolamine (TRANSDERM-SCOP) 1.3-1.5 mg (1 mg over 3 days) Place 1 patch onto the skin Every 3 (three) days.    sucralfate (CARAFATE) 1 gram tablet 1 tablet (1 g total) by Per G Tube route 4 (four) times daily before meals and nightly.    tamsulosin (FLOMAX) 0.4 mg Cap Take 1 capsule (0.4 mg total) by mouth every evening.    venlafaxine 75 mg TR24 1 tablet by Per G Tube route 2 (two) times a day.    XARELTO 20 mg Tab 1 tablet by Per G Tube route nightly.          Antimicrobials:  Antibiotics (From admission, onward)      Start     Stop Route Frequency Ordered    10/22/24 0900  mupirocin 2 % ointment         10/27/24 0859 Nasl 2 times daily 10/20/24 1549    10/21/24 1315  DAPTOmycin (CUBICIN) 555 mg in 0.9% NaCl SolP 50 mL IVPB         -- IV Every 24 hours (non-standard times) 10/21/24 1208    10/20/24 1630  ceFEPIme injection 2 g         -- IV Every 8 hours (non-standard times) 10/20/24 1549             Antifungals (From admission, onward)      None            Antivirals (From admission, onward)      None               Review of Systems   Review of Systems   All other systems reviewed and are negative.        Objective:     Vital Signs (Most Recent):  Temp: 98.6 °F (37 °C) (10/21/24 1123)  Pulse: (!) 122 (10/21/24 1123)  Resp: (!) 22 (10/21/24 1123)  BP: 102/79 (10/21/24 1123)  SpO2: 99 % (10/21/24 1123)  Vital Signs (24h Range):  Temp:  [98.3 °F (36.8  °C)-103 °F (39.4 °C)] 98.6 °F (37 °C)  Pulse:  [101-193] 122  Resp:  [17-40] 22  SpO2:  [72 %-100 %] 99 %  BP: ()/(40-94) 102/79      Weight:   Wt Readings from Last 1 Encounters:   10/21/24 69.1 kg (152 lb 5.4 oz)      Body mass index is Body mass index is 22.49 kg/m².     Estimated Creatinine Clearance: Estimated Creatinine Clearance: 68.4 mL/min (based on SCr of 1.01 mg/dL).     Physical Exam  Physical Exam  Constitutional:       Appearance: He is ill-appearing.   HENT:      Head: Normocephalic and atraumatic.      Mouth/Throat:      Pharynx: No oropharyngeal exudate or posterior oropharyngeal erythema.   Eyes:      Extraocular Movements: Extraocular movements intact.      Pupils: Pupils are equal, round, and reactive to light.   Cardiovascular:      Rate and Rhythm: Normal rate and regular rhythm.      Heart sounds: No murmur heard.  Pulmonary:      Effort: No respiratory distress.      Breath sounds: No wheezing, rhonchi or rales.      Comments: Trach and vent  Abdominal:      General: Bowel sounds are normal. There is no distension.      Palpations: Abdomen is soft.      Tenderness: There is no abdominal tenderness.      Comments: PEG tube   Musculoskeletal:         General: No swelling or tenderness.      Cervical back: Neck supple. No rigidity or tenderness.   Lymphadenopathy:      Cervical: No cervical adenopathy.   Skin:     Findings: No lesion or rash.      Comments: Sacral ulcer with slough on top   Neurological:      Comments: Unable to participate with evaluation, multiple contractures, non verbal at baseline, L side not moving at baseline, R side is moving freely but not to command    Psychiatric:         Mood and Affect: Mood normal.         Behavior: Behavior normal.           Significant Labs: CBC:   Recent Labs   Lab 10/20/24  1603 10/21/24  0147   WBC 16.52  16.52* 12.69  12.69*   HGB 11.5* 8.6*   HCT 37.1* 28.2*    198     CMP:   Recent Labs   Lab 10/20/24  1603 10/21/24  0147    * 154*   K 5.1 3.7   * 125*   CO2 19* 20*   BUN 45.3* 31.7*   CREATININE 1.48* 1.01   CALCIUM 8.4* 8.2*   ALBUMIN 2.6* 2.1*   BILITOT 0.8 0.6   ALKPHOS 105 90   AST 18 13   ALT 17 14         Microbiology Results (last 7 days)       Procedure Component Value Units Date/Time    Blood culture #2 **CANNOT BE ORDERED STAT** [[id number]]  (Abnormal) Collected: 10/20/24 1603    Order Status: Completed Specimen: Blood Updated: 10/21/24 1146     GRAM STAIN Gram Positive Cocci, probable Streptococcus      Seen in gram stain of broth only      2 of 2 bottles positive    BCID2 Panel [[id number]]  (Abnormal) Collected: 10/20/24 1603    Order Status: Completed Specimen: Blood Updated: 10/21/24 1146     CTX-M (ESBL ) N/A     IMP (Cabapenemase ) N/A     KPC resistance gene (Carbapenemase ) N/A     mcr-1 N/A     mecA ID N/A     Comment: Note: Antimicrobial resistance can occur via multiple mechanisms. A Not Detected result for antimicrobial resistance gene(s) does not indicate antimicrobial susceptibility. Subculturing is required for species identification and susceptibility testing of   isolates.        mecA/C and MREJ (MRSA) gene N/A     NDM (Carbapenemase ) N/A     OXA-48-like (Carbapenemase ) N/A     Sofi/B (VRE gene) Detected     VIM (Carbapenemase ) N/A     Enterococcus faecalis Not Detected     Enterococcus faecium Detected     Listeria monocytogenes Not Detected     Staphylococcus spp. Not Detected     Staphylococcus aureus Not Detected     Staphylococcus epidermidis Not Detected     Staphylococcus lugdunensis Not Detected     Streptococcus spp. Not Detected     Streptococcus agalactiae (Group B) Not Detected     Streptococcus pneumoniae Not Detected     Streptococcus pyogenes (Group A) Not Detected     Acinetobacter calcoaceticus/baumannii complex Not Detected     Bacteroides fragilis Not Detected     Enterobacterales Not Detected     Enterobacter cloacae  complex Not Detected     Escherichia coli Not Detected     Klebsiella aerogenes Not Detected     Klebsiella oxytoca Not Detected     Klebsiella pneumoniae group Not Detected     Proteus spp. Not Detected     Salmonella spp. Not Detected     Serratia marcescens Not Detected     Haemophilus influenzae Not Detected     Neisseria meningitidis Not Detected     Pseudomonas aeruginosa Not Detected     Stenotrophomonas maltophilia Not Detected     Candida albicans Not Detected     Candida auris Not Detected     Candida glabrata Not Detected     Candida krusei Not Detected     Candida parapsilosis Not Detected     Candida tropicalis Not Detected     Cryptococcus neoformans/gattii Not Detected    Narrative:      The [facility] BCID2 Panel is a multiplexed nucleic acid test intended for the use with NanoViricides® 2.0 or NanoViricides® [employer] Systems for the simultaneous qualitative detection and identification of multiple bacterial and yeast nucleic acids and select genetic determinants associated with antimicrobial resistance.  The [facility] BCID2 Panel test is performed directly on blood culture samples identified as positive by a continuous monitoring blood culture system.  Results are intended to be interpreted in conjunction with Gram stain results.    Respiratory Culture [[id number]] Collected: 10/20/24 2234    Order Status: Completed Specimen: Sputum Updated: 10/21/24 0836     GRAM STAIN Quality 3+      Moderate Gram Negative Rods      Many Gram Positive Rods      Rare Yeast    Urine culture [[id number]] Collected: 10/20/24 1654    Order Status: Completed Specimen: Urine Updated: 10/21/24 0629     Urine Culture No Growth At 24 Hours    Blood culture #1 **CANNOT BE ORDERED STAT** [[id number]] Collected: 10/20/24 1603    Order Status: Resulted Specimen: Blood Updated: 10/20/24 1614             Significant Imaging: I have reviewed all pertinent imaging results/findings within the past 24 hours.      Kenneth Bhardwaj,  MD  Infectious Disease  [facility]

## 2024-10-21 NOTE — CONSULTS
[facility] - [facility] North ICU  Wound Care    Patient Name:  Delio Daniel Jr.   MRN:  [id number]  Date: 10/21/2024  Diagnosis: UTI (urinary tract infection)    History:     Past Medical History:   Diagnosis Date    Arthritis     Atrial fibrillation     BPH (benign prostatic hyperplasia)     Cardiac arrest     Coronary artery disease     Cyst, kidney, acquired     Diverticulosis     Hyperlipidemia     Hypertension     MI (myocardial infarction)     Obesity     Steatosis of liver     Stroke        Social History     Socioeconomic History    Marital status:     Number of children: 9   Occupational History    Occupation: retired   Tobacco Use    Smoking status: Never    Smokeless tobacco: Never   Substance and Sexual Activity    Alcohol use: Not Currently    Drug use: Not Currently    Sexual activity: Not Currently     Partners: Female     Social Drivers of Health     Financial Resource Strain: Patient Unable To Answer (10/21/2024)    Overall Financial Resource Strain (CARDIA)     Difficulty of Paying Living Expenses: Patient unable to answer   Food Insecurity: Patient Unable To Answer (10/21/2024)    Hunger Vital Sign     Worried About Running Out of Food in the Last Year: Patient unable to answer     Ran Out of Food in the Last Year: Patient unable to answer   Transportation Needs: Patient Unable To Answer (10/21/2024)    TRANSPORTATION NEEDS     Transportation : Patient unable to answer   Physical Activity: Sufficiently Active (8/5/2024)    Exercise Vital Sign     Days of Exercise per Week: 5 days     Minutes of Exercise per Session: 30 min   Stress: Patient Unable To Answer (10/21/2024)    [employer] [place] [employer] - Occupational Stress Questionnaire     Feeling of Stress : Patient unable to answer   Housing Stability: Patient Unable To Answer (10/21/2024)    Housing Stability Vital Sign     Unable to Pay for Housing in the Last Year: Patient unable to answer     Homeless in the Last  Year: Patient unable to answer       Precautions:     Allergies as of 10/20/2024    (No Known Allergies)       [facility] Assessment Details/Treatment        10/21/24 1103   WOCN Assessment   Visit Date 10/21/24   Visit Time 1103   Consult Type New   [facility] Special[facility] Wound   [facility]   Intervention chart review;assessed;applied;orders   Teaching on-going        Wound 10/20/24 2100 Pressure Injury Sacral spine   Date First Assessed/Time First Assessed: 10/20/24 2100   Present on Original Admission: Yes  Primary Wound Type: Pressure Injury  Location: Sacral spine  Is this injury device related?: No   Wound Image    Pressure Injury Stage U   Dressing Appearance Moist drainage   Drainage Amount Small   Drainage Characteristics/Odor Creamy;Serosanguineous   Appearance Pink;Red;Yellow;Slough   Tissue loss description Full thickness   Black (%), Wound Tissue Color 0 %   Red (%), Wound Tissue Color 50 %   Yellow (%), Wound Tissue Color 50 %   Periwound Area Blistered   Wound Length (cm) 8 cm   Wound Width (cm) 9 cm   Wound Depth (cm) 1.7 cm   Wound Volume (cm^3) 122.4 cm^3   Wound Surface Area (cm^2) 72 cm^2   Undermining (depth (cm)/location) UM 2-6 oclock @2cm   Care Cleansed with:;Antimicrobial agent  (vashe)   Dressing Applied;Other (comment)  (vashe moistened mesalt to yellow/sloughed area of wound, vashe wet to dry on rest of wound, abd pad, tape)     WO consulted for sacrum. Discussed POC w/ nurse Orsalva. No family at bedside. Pt. Is in ICU on ICU YOLETTE bed. Pt. Has a trach and is on the ventilator. Treatment recommendations: Sacrum: clean w/ vashe, dry well, apply vashe moistened mesalt to yellow/slough area of wound bed, then apply vashe moistened gauze to the rest of wound bed, dry gauze, abd pad, secure w/ tape. BID/Prn if soilage. Spoke to nurse about having MD c/s wound care MD or sx for possible debridement. Nursing to cont. Tx recs and preventative measures. Will follow up.     10/21/2024

## 2024-10-21 NOTE — ED NOTES
Daughter at bedside - Carmen [phone] states pt has hx of stroke 12/19/19 which resulted in R bone flap removal. States he typically wears a helmet at NH but was not sent w/ it and was not present upon arrival to ED.

## 2024-10-21 NOTE — CONSULTS
Inpatient consult to Cardiology  Consult performed by: Leopoldo Mullins FNP  Consult ordered by: Itz Francisco MD  Reason for consult: AF        [facility] - [facility]    Cardiology  Consult Note    Patient Name: Delio Daniel Jr.  MRN: [id number]  Admission Date: 10/20/2024  Hospital Length of Stay: 1 days  Code Status: Full Code   Attending Provider: Itz Francisco MD   Consulting Provider: TRUMAN Erickson  Primary Care Physician: Jl Briones MD  Principal Problem:UTI (urinary tract infection)    Patient information was obtained from past medical records, ER records, and primary team.     Subjective:   Chief Complaint/Reason for Consult: AF    HPI:   Mr. Daniel is a 68 y/o male with a history of AFIB on Xarelto, CVA, SSS, HTN, HLD, CAD, who is known to [facility], Dr. Kimbrough. He presented to the ER on 8.3.24 from [facility] with decreased responsiveness, severe sepsis, and recurrent UTI. 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 Patient nonverbal at baseline. In the ED, patient was febrile, tachycardic with -190s, tachypneic, /60. EKG showed Afib with RVR. Diltiazem drip started in ED for acute HR Control. Patient required mechanical ventilation and admitted to the ICU. Patient treated for sepsis related to UTI. [facility] consulted for AF Management.     PMH: Arthritis, AF, BPH, Cardiac Arrest, CAD, Renal Cyst, Diverticulosis, Hyperlipidemia, Hypertension, MI, Obesity, Lever Steatosis, Stroke  PSH: Pacemaker, LHC, Colonoscopy, EGD, Colon Excision, Tracheostomy  Family History: Mother - HTN; Sister - HTN; Brother - HTN   Social History: Tobacco- Negative, Alcohol- Negative, Substance Abuse- Negative    Previous Cardiac Diagnostics:   Echocardiogram (5.3.24):  EF 65%  RV with Normal RV Function.  MR- Mild. TR- Mild.   Pericardial Effusion- None. Anterior Fat Pad Noted.     ECHO (1.15.24):  TDS. No Definity  contrast available for use. Left Ventricle: The left ventricle is normal in size. Moderately increased wall thickness. Normal wall motion. There is normal systolic function. Ejection fraction by visual approximation is 55%. Right Ventricle: Normal right ventricular cavity size. Systolic function is borderline low. Left Atrium: Left atrium is severely dilated. Right Atrium: Right atrium is mildly dilated. Mitral Valve: There is bileaflet sclerosis. There is mild regurgitation. IVC/SVC: Normal venous pressure at 3 mmHg.     Venous US LUE (12.26.23):  There was no evidence of deep vein thrombosis in the left upper extremity.   A superficial thrombosis was identified in the left cephalic vein.      Carotid US (12.19.23):  The right internal carotid artery demonstrated less than 50% stenosis.  The left internal carotid artery demonstrated less than 50% stenosis.  The bilateral vertebral arteries were patent with antegrade flow.  Bilateral internal carotid arteries demonstrated decreased velocities starting from the common carotid arteries.      LHC (5.25.18):   LM: Normal. Normal size and bifurcation. LAD: Abnormal. Large, mild atherosclerotic plaque, moderately large diagonals. Mid LAD 30% stenosis. The lesion was tubular and eccentric. LCX: abnormal and Large, mild atherosclerotic plaque, large OM with mild narrowing.OM 1 35% stenosis. RCA: normal. Large and no significant disease.        Review of patient's allergies indicates:  No Known Allergies  No current facility-administered medications on file prior to encounter.     Current Outpatient Medications on File Prior to Encounter   Medication Sig    ascorbic Acid (VITAMIN C) 500 mg CpSR 500 mg 2 (two) times daily. Per PEG tube    busPIRone (BUSPAR) 5 MG Tab 5 mg by Per G Tube route 3 (three) times daily.    cholestyramine (QUESTRAN) 4 gram packet 4 g once daily. Via PEG tube    cholestyramine-aspartame (QUESTRAN LIGHT) 4 gram PwPk 1 packet (4 g total) by Per G Tube  route 2 (two) times daily.    diltiaZEM (CARDIZEM) 60 MG tablet 60 mg by Per G Tube route every 6 (six) hours.    ferrous sulfate 300 mg (60 mg iron)/5 mL syrup Take 5 mLs (300 mg total) by mouth once daily.    finasteride (PROSCAR) 5 mg tablet Take 1 tablet (5 mg total) by mouth once daily.    furosemide (LASIX) 80 MG tablet 80 mg by Per G Tube route as needed (for Edema).    L. acidophilus/L.bulgaricus (FLORANEX ORAL) 1 packet by PEG Tube route Daily.    levetiracetam 500 mg/5 mL (5 mL) Soln 1,500 mg by Per G Tube route 2 (two) times daily. Nursing home reports medication hold by MD until level redraw on Monday.    LIPITOR 10 mg tablet 10 mg by Per G Tube route once daily.    metoclopramide HCl (REGLAN) 5 mg/5 mL Soln 10 mg by Per G Tube route 3 (three) times daily before meals.    metoprolol tartrate (LOPRESSOR) 100 MG tablet 100 mg by Per G Tube route 2 (two) times daily.    miconazole NITRATE 2 % (MICOTIN) 2 % top powder Apply topically 2 (two) times daily.    multivitamin (THERAGRAN) per tablet 1 tablet by Per G Tube route once daily.    pantoprazole (PROTONIX) 40 mg injection Inject 40 mg into the vein 2 (two) times daily.    polyethylene glycol (GLYCOLAX) 17 gram PwPk Take 17 g by mouth 2 (two) times daily as needed for Constipation.    protein supplement (PROMOD PROTEIN ORAL) 30 mLs by Per G Tube route once daily.    QUEtiapine (SEROQUEL) 25 MG Tab 25 mg by Per G Tube route 2 (two) times daily.    scopolamine (TRANSDERM-SCOP) 1.3-1.5 mg (1 mg over 3 days) Place 1 patch onto the skin Every 3 (three) days.    sucralfate (CARAFATE) 1 gram tablet 1 tablet (1 g total) by Per G Tube route 4 (four) times daily before meals and nightly.    tamsulosin (FLOMAX) 0.4 mg Cap Take 1 capsule (0.4 mg total) by mouth every evening.    venlafaxine 75 mg TR24 1 tablet by Per G Tube route 2 (two) times a day.    XARELTO 20 mg Tab 1 tablet by Per G Tube route nightly.     Review of Systems   Unable to perform ROS: Patient  "nonverbal     Objective:     Vital Signs (Most Recent):  Temp: 98.6 °F (37 °C) (10/21/24 0706)  Pulse: (!) 122 (10/21/24 0706)  Resp: (!) 22 (10/21/24 0706)  BP: 102/79 (10/21/24 0706)  SpO2: 96 % (10/21/24 0706) Vital Signs (24h Range):  Temp:  [98.3 °F (36.8 °C)-103 °F (39.4 °C)] 98.6 °F (37 °C)  Pulse:  [101-193] 122  Resp:  [17-40] 22  SpO2:  [72 %-100 %] 96 %  BP: ()/(40-94) 102/79   Weight: 69.1 kg (152 lb 5.4 oz)  Body mass index is 22.5 kg/m².  SpO2: 96 %       Intake/Output Summary (Last 24 hours) at 10/21/2024 0741  Last data filed at 10/21/2024 0733  Gross per 24 hour   Intake 2852.94 ml   Output 800 ml   Net 2052.94 ml     Lines/Drains/Airways       Drain  Duration                  Gastrostomy/Enterostomy 01/02/24 1230  days         Urethral Catheter 10/20/24 2210 Silicone 16 Fr. <1 day              Airway  Duration             Adult Surgical Airway 08/19/24 0120 Shiley Extra Large Cuffed Distal 6.0/ 75mm 63 days              Peripheral Intravenous Line  Duration                  Midline Catheter - Single Lumen 10/20/24 1530 Right <1 day         Peripheral IV - Single Lumen 10/20/24 1600 18 G Anterior;Distal;Left Upper Arm <1 day                  Significant Labs:   Chemistries:   Recent Labs   Lab 10/20/24  1603 10/20/24  1943 10/21/24  0147   *  --  154*   K 5.1  --  3.7   *  --  125*   CO2 19*  --  20*   BUN 45.3*  --  31.7*   CREATININE 1.48*  --  1.01   CALCIUM 8.4*  --  8.2*   BILITOT 0.8  --  0.6   ALKPHOS 105  --  90   ALT 17  --  14   AST 18  --  13   GLUCOSE 157*  --  121*   MG 2.60  --  2.40   TROPONINI 0.118* 0.094*  --         CBC/Anemia Labs: [facility]:    Recent Labs   Lab 10/20/24  1603 10/21/24  0147   WBC 16.52  16.52* 12.69  12.69*   HGB 11.5* 8.6*   HCT 37.1* 28.2*    198   MCV 84.7 85.5   RDW 17.5* 17.2*    No results for input(s): "PT", "INR", "APTT" in the last 168 hours.     Significant Imaging:  Imaging Results              X-Ray Chest AP Portable " (Final result)  Result time 10/20/24 19:36:31      Final result by Sami Taylor MD (10/20/24 19:36:31)                   Impression:      No acute cardiopulmonary process identified.      Electronically signed by: Sami Taylor  Date:    10/20/2024  Time:    19:36               Narrative:    EXAMINATION:  XR CHEST AP PORTABLE    CLINICAL HISTORY:  Sepsis, unspecified organism    TECHNIQUE:  One view    COMPARISON:  August 21, 2020.    FINDINGS:  Cardiopericardial silhouette appearance is similar.  Tracheostomy cannula is in similar location.  Right chest implanted cardiac device electrodes terminate within the right atrium and right ventricle.  No acute dense focal or segmental consolidation, congestive process, pleural effusions or pneumothorax.                                      Telemetry:  AF RVR    Physical Exam  Vitals and nursing note reviewed.   Constitutional:       General: He is not in acute distress.     Appearance: He is ill-appearing.   Neck:      Comments: Tracheostomy  Cardiovascular:      Rate and Rhythm: Tachycardia present. Rhythm irregular.      Heart sounds: Normal heart sounds.   Pulmonary:      Effort: Pulmonary effort is normal. No respiratory distress.      Comments: Mechanical Ventilation- Vent associated breath sounds.  Abdominal:      Palpations: Abdomen is soft.   Skin:     General: Skin is warm and dry.   Neurological:      Mental Status: Mental status is at baseline.       Home Medications:   No current facility-administered medications on file prior to encounter.     Current Outpatient Medications on File Prior to Encounter   Medication Sig Dispense Refill    ascorbic Acid (VITAMIN C) 500 mg CpSR 500 mg 2 (two) times daily. Per PEG tube      busPIRone (BUSPAR) 5 MG Tab 5 mg by Per G Tube route 3 (three) times daily.      cholestyramine (QUESTRAN) 4 gram packet 4 g once daily. Via PEG tube      cholestyramine-aspartame (QUESTRAN LIGHT) 4 gram PwPk 1 packet (4 g total) by Per G Tube  route 2 (two) times daily. 180 packet 3    diltiaZEM (CARDIZEM) 60 MG tablet 60 mg by Per G Tube route every 6 (six) hours.      ferrous sulfate 300 mg (60 mg iron)/5 mL syrup Take 5 mLs (300 mg total) by mouth once daily. 450 mL 0    finasteride (PROSCAR) 5 mg tablet Take 1 tablet (5 mg total) by mouth once daily. 30 tablet 11    furosemide (LASIX) 80 MG tablet 80 mg by Per G Tube route as needed (for Edema).      L. acidophilus/L.bulgaricus (FLORANEX ORAL) 1 packet by PEG Tube route Daily.      levetiracetam 500 mg/5 mL (5 mL) Soln 1,500 mg by Per G Tube route 2 (two) times daily. Nursing home reports medication hold by MD until level redraw on Monday.      LIPITOR 10 mg tablet 10 mg by Per G Tube route once daily.      metoclopramide HCl (REGLAN) 5 mg/5 mL Soln 10 mg by Per G Tube route 3 (three) times daily before meals.      metoprolol tartrate (LOPRESSOR) 100 MG tablet 100 mg by Per G Tube route 2 (two) times daily.      miconazole NITRATE 2 % (MICOTIN) 2 % top powder Apply topically 2 (two) times daily.      multivitamin (THERAGRAN) per tablet 1 tablet by Per G Tube route once daily.      pantoprazole (PROTONIX) 40 mg injection Inject 40 mg into the vein 2 (two) times daily.      polyethylene glycol (GLYCOLAX) 17 gram PwPk Take 17 g by mouth 2 (two) times daily as needed for Constipation.      protein supplement (PROMOD PROTEIN ORAL) 30 mLs by Per G Tube route once daily.      QUEtiapine (SEROQUEL) 25 MG Tab 25 mg by Per G Tube route 2 (two) times daily.      scopolamine (TRANSDERM-SCOP) 1.3-1.5 mg (1 mg over 3 days) Place 1 patch onto the skin Every 3 (three) days.      sucralfate (CARAFATE) 1 gram tablet 1 tablet (1 g total) by Per G Tube route 4 (four) times daily before meals and nightly.      tamsulosin (FLOMAX) 0.4 mg Cap Take 1 capsule (0.4 mg total) by mouth every evening. 30 capsule 11    venlafaxine 75 mg TR24 1 tablet by Per G Tube route 2 (two) times a day.      XARELTO 20 mg Tab 1 tablet by Per G  Tube route nightly.       Current Schedule Inpatient Medications:   atorvastatin  10 mg Per G Tube Daily    ceFEPime IV (PEDS and ADULTS)  2 g Intravenous Q8H    diltiaZEM  60 mg Per G Tube Q6H    [START ON 10/22/2024] mupirocin   Nasal BID    pantoprazole  40 mg Intravenous Daily    QUEtiapine  25 mg Per G Tube BID    rivaroxaban  20 mg Per G Tube Nightly     Continuous Infusions:   amiodarone in dextrose 5%  0.5 mg/min Intravenous Continuous 16.7 mL/hr at 10/21/24 0733 0.5 mg/min at 10/21/24 0733    lactated ringers   Intravenous Continuous 20 mL/hr at 10/21/24 0407 Rate Change at 10/21/24 0407     Assessment:   Persistent AF - Currently AF RVR    - [id number] Score 4 (4.8% Stroke Risk per Year)    - on Xarelto for Stroke Risk Reduction  CAD    - [facility] (5.25.18): LM: Normal. Normal size and bifurcation. LAD: Abnormal. Large, mild atherosclerotic plaque, moderately large diagonals. Mid LAD 30% stenosis. The lesion was tubular and eccentric. LCX: abnormal and Large, mild atherosclerotic plaque, large OM with mild narrowing.OM 1 35% stenosis. RCA: normal. Large and no significant disease.      - EF 65% (May 2024)   NSTEMI Type II due to Sepsis/UTI   Hypertension (BP Low Normal Systolic Readings)  Hyperlipidemia  Sepsis    - Leukocytosis    - Chest XR: No Acute Process    - Sacral Wound  UTI  Anemia   History of Hemorrhagic CVA (12/2023)    - Residual Left Sided Deficits    - S/P Peg Tube and Trach   Small Left Pleural Effusion   SSS    - Dual Chamber ICD  Arthritis  Dysphagia    - s/p PEG   BRIAN (Resolved)  Hypernatremia  History of Seizure Disorder  Liver Steatosis  Diverticulosis  BPH  Depression  Obesity   No known history of GI Bleed     Plan:   Discontinue Cardizem given Marginal BP.  Start Metoprolol Tartrate 25 Mg TID Per Peg- with Hold Parameters  Give Digoxin 0.5 mg x 1 dose now, repeat 0.25 mg in 6 hours.  On Xarelto for stroke risk reduction  Consider Palliative care consultation for goals of care  discussion. Patient is hospice appropriate.    Thank you for your consult.     TRUMAN Erickson  Cardiology  [facility] - [facility] ICU  10/21/2024     I agree with the findings of the complexity of problems addressed and take responsibility for the plan's risks and complications. I approved the plan documented by Leopoldo Mullins NP.

## 2024-10-21 NOTE — PROGRESS NOTES
Inpatient Nutrition Assessment    Admit Date: 10/20/2024   Total duration of encounter: 1 day   Patient Age: 69 y.o.    Nutrition Recommendation/Prescription     Start tube feeding when appropriate.  Tube feeding recommendation:     Impact Peptide 1.5 goal rate 70 ml/hr to provide  2100 kcal/d  (111% est needs)  131 g protein/d (100% est needs)  1078 ml free water/d (52% est needs)  (calculations based on estimated 20 hr/d run time)     If no IV fluids running, can give 100ml q 2hr water flushes. Total water provided: 2078ml (100% est needs.)     Patel (provides 90 kcal, 2.5 g protein per serving) BID.    Communication of Recommendations: reviewed with nurse    Nutrition Assessment     Malnutrition Assessment/Nutrition-Focused Physical Exam    Malnutrition Context: chronic illness (10/21/24 1028)  Malnutrition Level: severe (10/21/24 1028)  Energy Intake (Malnutrition):  (unable to eval) (10/21/24 1028)  Weight Loss (Malnutrition): greater than 10% in 6 months (10/21/24 1028)  Subcutaneous Fat (Malnutrition): severe depletion (10/21/24 1028)  Orbital Region (Subcutaneous Fat Loss): severe depletion  Upper Arm Region (Subcutaneous Fat Loss): severe depletion     Muscle Mass (Malnutrition): severe depletion (10/21/24 1028)     Clavicle Bone Region (Muscle Loss): severe depletion  Clavicle and Acromion Bone Region (Muscle Loss): severe depletion  Scapular Bone Region (Muscle Loss): severe depletion              Fluid Accumulation (Malnutrition):  (does not meet criteria) (10/21/24 1028)        A minimum of two characteristics is recommended for diagnosis of either severe or non-severe malnutrition.    Chart Review    Reason Seen: continuous nutrition monitoring and physician consult for TF    Malnutrition Screening Tool Results   Have you recently lost weight without trying?: Unsure  Have you been eating poorly because of a decreased appetite?: No   MST Score: 2   Diagnosis:  Severe sepsis   UTI  Afib with  RVR  Hypernatremia   Sacral wound    Relevant Medical History:    Arthritis      Atrial fibrillation      BPH (benign prostatic hyperplasia)      Cardiac arrest      Coronary artery disease      Cyst, kidney, acquired      Diverticulosis      Hyperlipidemia      Hypertension      MI (myocardial infarction)      Obesity      Steatosis of liver      Stroke      Scheduled Medications:  atorvastatin, 10 mg, Daily  ceFEPime IV (PEDS and ADULTS), 2 g, Q8H  diltiaZEM, 60 mg, Q6H  [START ON 10/22/2024] mupirocin, , BID  pantoprazole, 40 mg, Daily  QUEtiapine, 25 mg, BID  rivaroxaban, 20 mg, Nightly    Continuous Infusions:  amiodarone in dextrose 5%, Last Rate: 0.5 mg/min (10/21/24 0912)    PRN Medications:  acetaminophen, 650 mg, Q6H PRN  dextromethorphan-guaiFENesin  mg/5 ml, 5 mL, Q4H PRN  dextrose 10%, 12.5 g, PRN  dextrose 10%, 25 g, PRN  glucagon (human recombinant), 1 mg, PRN  hydrALAZINE, 10 mg, Q4H PRN  metoprolol, 5 mg, Q5 Min PRN  naloxone, 0.02 mg, PRN  sodium chloride 0.9%, 10 mL, Q12H PRN  vancomycin - pharmacy to dose, , pharmacy to manage frequency    Calorie Containing IV Medications: no significant kcals from medications at this time    Recent Labs   Lab 10/20/24  1603 10/21/24  0147   * 154*   K 5.1 3.7   CALCIUM 8.4* 8.2*   MG 2.60 2.40   * 125*   CO2 19* 20*   BUN 45.3* 31.7*   CREATININE 1.48* 1.01   EGFRNORACEVR 51 >60   GLUCOSE 157* 121*   BILITOT 0.8 0.6   ALKPHOS 105 90   ALT 17 14   AST 18 13   ALBUMIN 2.6* 2.1*   WBC 16.52  16.52* 12.69  12.69*   HGB 11.5* 8.6*   HCT 37.1* 28.2*     Nutrition Orders:  Diet NPO      Appetite/Oral Intake: not applicable/not applicable  Factors Affecting Nutritional Intake: on mechanical ventilation and tracheostomy  Social Needs Impacting Access to Food: none identified (from NH)  Food/Restorationist/Cultural Preferences: unable to obtain  Food Allergies: no known food allergies  Last Bowel Movement: 10/19/24  Wound(s):[REMOVED]      Altered Skin  "Integrity 24 1100 lower Buttocks Moisture associated dermatitis-Tissue loss description: Full thickness     Comments    10/21/24: Pt with previous EMR wts indicating wt loss. On NH TF. Also with increased protein needs due to wounds.     Anthropometrics    Height: 5' 9.02" (175.3 cm), Height Method: Estimated  Last Weight: 69.1 kg (152 lb 5.4 oz) (10/21/24 1026), Weight Method: Bed Scale  BMI (Calculated): 22.5  BMI Classification: normal (BMI 18.5-24.9)        Ideal Body Weight (IBW), Male: 160.12 lb     % Ideal Body Weight, Male (lb): 95.21 %                 Usual Body Weight (UBW), k kg (per EMR from 24)  % Usual Body Weight: 76.94  % Weight Change From Usual Weight: -23.22 %  Usual Weight Provided By: EMR weight history    Wt Readings from Last 5 Encounters:   10/21/24 69.1 kg (152 lb 5.4 oz)   24 90.7 kg (199 lb 15.3 oz)   24 117.9 kg (260 lb)   24 117.9 kg (260 lb)   24 90.7 kg (200 lb)     Weight Change(s) Since Admission:   Wt Readings from Last 1 Encounters:   10/21/24 1026 69.1 kg (152 lb 5.4 oz)   10/21/24 0634 69.1 kg (152 lb 5.4 oz)   10/20/24 1535 63.5 kg (140 lb)   Admit Weight: 63.5 kg (140 lb) (10/20/24 1535), Weight Method: Estimated    Estimated Needs    Weight Used For Calorie Calculations: 69.1 kg (152 lb 5.4 oz)  Energy Calorie Requirements (kcal): 1886kcal (1.3 stress factor)  Energy Need Method: Castaner-St Jeor  Weight Used For Protein Calculations: 69.1 kg (152 lb 5.4 oz)  Protein Requirements: 124-138gm (1.8-2g/kg)  Fluid Requirements (mL): 2073ml (30ml/kg)  CHO Requirement: 210gm (45% est kcal needs)     Enteral Nutrition     Patient not receiving enteral nutrition at this time.    Parenteral Nutrition     Patient not receiving parenteral nutrition support at this time.    Evaluation of Received Nutrient Intake    Calories: not meeting estimated needs  Protein: not meeting estimated needs    Patient Education     Not applicable.    Nutrition " Diagnosis     PES: Inadequate oral intake related to chronic illness as evidenced by trach/PEG. (new)     PES: Severe chronic disease or condition related malnutrition related to chronic illness as evidenced by severe fat depletion, severe muscle depletion, and greater than 10% weight loss in 6 months. (new)    Nutrition Interventions     Intervention(s): modified composition of enteral nutrition, modified rate of enteral nutrition, and collaboration with other providers    Goal: Meet greater than 80% of nutritional needs by follow-up. (new)  Goal: Tolerate enteral feeding at goal rate by follow-up. (new)    Nutrition Goals & Monitoring     Dietitian will monitor: energy intake and enteral nutrition intake  Discharge planning: too early to determine; pending clinical course  Nutrition Risk/Follow-Up: high (follow-up in 1-4 days)   Please consult if re-assessment needed sooner.

## 2024-10-21 NOTE — PLAN OF CARE
10/21/24 1333   Discharge Assessment   Assessment Type Discharge Planning Brief Assessment   Confirmed/corrected address, phone number and insurance Yes   Confirmed Demographics Correct on Facesheet   Source of Information health record   Communicated EDER with patient/caregiver Date not available/Unable to determine   Reason For Admission UTI   People in Home facility resident   Facility Arrived From: [facility]   Do you expect to return to your current living situation? Yes   Do you have help at home or someone to help you manage your care at home? Yes   Who are your caregiver(s) and their phone number(s)? facility resident   Prior to hospitilization cognitive status: Unable to Assess   Current cognitive status: Unable to Assess   Walking or Climbing Stairs Difficulty yes   Walking or Climbing Stairs transferring difficulty, dependent   Dressing/Bathing Difficulty yes   Dressing/Bathing dressing difficulty, dependent   Home Accessibility wheelchair accessible   Equipment Currently Used at Home ventilator;hospital bed   Patient currently being followed by outpatient case management? No   Do you currently have service(s) that help you manage your care at home? No   Do you take prescription medications? Yes   Do you have prescription coverage? Yes   Coverage medicaid   Who is going to help you get home at discharge? [facility]   How do you get to doctors appointments? health plan transportation   Do you take coumadin? No   Discharge Plan A Return to nursing home   Discharge Plan B Return to Nursing Home   DME Needed Upon Discharge  none   Transition of Care Barriers None     Resident at [facility]. Sent clinical updates via [employer].

## 2024-10-22 PROBLEM — L89.524: Chronic | Status: ACTIVE | Noted: 2024-10-22

## 2024-10-22 PROBLEM — L89.150 UNSTAGEABLE PRESSURE ULCER OF SACRAL REGION: Chronic | Status: ACTIVE | Noted: 2024-10-22

## 2024-10-22 PROBLEM — L89.893: Chronic | Status: ACTIVE | Noted: 2024-10-22

## 2024-10-22 LAB
ALBUMIN SERPL-MCNC: 1.9 G/DL (ref 3.4–4.8)
ALBUMIN/GLOB SERPL: 0.5 RATIO (ref 1.1–2)
ALP SERPL-CCNC: 94 UNIT/L (ref 40–150)
ALT SERPL-CCNC: 10 UNIT/L (ref 0–55)
ANION GAP SERPL CALC-SCNC: 8 MEQ/L
APICAL FOUR CHAMBER EJECTION FRACTION: 61 %
APICAL TWO CHAMBER EJECTION FRACTION: 71 %
AST SERPL-CCNC: 12 UNIT/L (ref 5–34)
AV INDEX (PROSTH): 0.59
AV MEAN GRADIENT: 6 MMHG
AV PEAK GRADIENT: 10.2 MMHG
AV VELOCITY RATIO: 0.63
BACTERIA UR CULT: NO GROWTH
BASOPHILS # BLD AUTO: 0.04 X10(3)/MCL
BASOPHILS NFR BLD AUTO: 0.3 %
BILIRUB SERPL-MCNC: 0.5 MG/DL
BSA FOR ECHO PROCEDURE: 1.83 M2
BUN SERPL-MCNC: 28.1 MG/DL (ref 8.4–25.7)
CALCIUM SERPL-MCNC: 8.5 MG/DL (ref 8.8–10)
CHLORIDE SERPL-SCNC: 124 MMOL/L (ref 98–107)
CO2 SERPL-SCNC: 21 MMOL/L (ref 23–31)
CREAT SERPL-MCNC: 0.82 MG/DL (ref 0.72–1.25)
CREAT/UREA NIT SERPL: 34
CV ECHO LV RWT: 0.7 CM
DOP CALC AO PEAK VEL: 1.6 M/S
DOP CALC AO VTI: 33.4 CM
DOP CALC LVOT PEAK VEL: 1 M/S
DOP CALCLVOT PEAK VEL VTI: 19.7 CM
E WAVE DECELERATION TIME: 164 MSEC
E/A RATIO: 0.89
E/E' RATIO: 5.38 M/S
ECHO LV POSTERIOR WALL: 1.4 CM (ref 0.6–1.1)
EOSINOPHIL # BLD AUTO: 0.1 X10(3)/MCL (ref 0–0.9)
EOSINOPHIL NFR BLD AUTO: 0.8 %
ERYTHROCYTE [DISTWIDTH] IN BLOOD BY AUTOMATED COUNT: 17.4 % (ref 11.5–17)
FRACTIONAL SHORTENING: 40 % (ref 28–44)
GFR SERPLBLD CREATININE-BSD FMLA CKD-EPI: >60 ML/MIN/1.73/M2
GLOBULIN SER-MCNC: 4.2 GM/DL (ref 2.4–3.5)
GLUCOSE SERPL-MCNC: 83 MG/DL (ref 82–115)
HCT VFR BLD AUTO: 30.5 % (ref 42–52)
HGB BLD-MCNC: 8.7 G/DL (ref 14–18)
IMM GRANULOCYTES # BLD AUTO: 0.08 X10(3)/MCL (ref 0–0.04)
IMM GRANULOCYTES NFR BLD AUTO: 0.6 %
INTERVENTRICULAR SEPTUM: 1.4 CM (ref 0.6–1.1)
LEFT ATRIUM AREA SYSTOLIC (APICAL 2 CHAMBER): 20.7 CM2
LEFT ATRIUM AREA SYSTOLIC (APICAL 4 CHAMBER): 27.4 CM2
LEFT ATRIUM SIZE: 4.7 CM
LEFT ATRIUM VOLUME INDEX MOD: 40.1 ML/M2
LEFT ATRIUM VOLUME MOD: 73.8 ML
LEFT INTERNAL DIMENSION IN SYSTOLE: 2.4 CM (ref 2.1–4)
LEFT VENTRICLE DIASTOLIC VOLUME INDEX: 37.61 ML/M2
LEFT VENTRICLE DIASTOLIC VOLUME: 69.2 ML
LEFT VENTRICLE END DIASTOLIC VOLUME APICAL 2 CHAMBER: 72.2 ML
LEFT VENTRICLE END DIASTOLIC VOLUME APICAL 4 CHAMBER: 53.9 ML
LEFT VENTRICLE END SYSTOLIC VOLUME APICAL 2 CHAMBER: 57.9 ML
LEFT VENTRICLE END SYSTOLIC VOLUME APICAL 4 CHAMBER: 94.5 ML
LEFT VENTRICLE MASS INDEX: 113.6 G/M2
LEFT VENTRICLE SYSTOLIC VOLUME INDEX: 11 ML/M2
LEFT VENTRICLE SYSTOLIC VOLUME: 20.2 ML
LEFT VENTRICULAR INTERNAL DIMENSION IN DIASTOLE: 4 CM (ref 3.5–6)
LEFT VENTRICULAR MASS: 209 G
LV LATERAL E/E' RATIO: 5.38 M/S
LV SEPTAL E/E' RATIO: 5.38 M/S
LVED V (TEICH): 69.2 ML
LVES V (TEICH): 20.2 ML
LVOT MG: 2 MMHG
LVOT MV: 0.66 CM/S
LYMPHOCYTES # BLD AUTO: 1.07 X10(3)/MCL (ref 0.6–4.6)
LYMPHOCYTES NFR BLD AUTO: 8.5 %
MAGNESIUM SERPL-MCNC: 2.4 MG/DL (ref 1.6–2.6)
MCH RBC QN AUTO: 25.9 PG (ref 27–31)
MCHC RBC AUTO-ENTMCNC: 28.5 G/DL (ref 33–36)
MCV RBC AUTO: 90.8 FL (ref 80–94)
MONOCYTES # BLD AUTO: 0.51 X10(3)/MCL (ref 0.1–1.3)
MONOCYTES NFR BLD AUTO: 4 %
MV PEAK A VEL: 0.79 M/S
MV PEAK E VEL: 0.7 M/S
NEUTROPHILS # BLD AUTO: 10.81 X10(3)/MCL (ref 2.1–9.2)
NEUTROPHILS NFR BLD AUTO: 85.8 %
NRBC BLD AUTO-RTO: 0 %
OHS LV EJECTION FRACTION SIMPSONS BIPLANE MOD: 67 %
PISA TR MAX VEL: 2.12 M/S
PLATELET # BLD AUTO: 178 X10(3)/MCL (ref 130–400)
PMV BLD AUTO: 11.6 FL (ref 7.4–10.4)
POTASSIUM SERPL-SCNC: 3.7 MMOL/L (ref 3.5–5.1)
PROT SERPL-MCNC: 6.1 GM/DL (ref 5.8–7.6)
PV PEAK GRADIENT: 9 MMHG
PV PEAK VELOCITY: 1.51 M/S
RA PRESSURE ESTIMATED: 3 MMHG
RBC # BLD AUTO: 3.36 X10(6)/MCL (ref 4.7–6.1)
RV TB RVSP: 5 MMHG
SODIUM SERPL-SCNC: 153 MMOL/L (ref 136–145)
TDI LATERAL: 0.13 M/S
TDI SEPTAL: 0.13 M/S
TDI: 0.13 M/S
TR MAX PG: 18 MMHG
TRICUSPID ANNULAR PLANE SYSTOLIC EXCURSION: 1.88 CM
TV REST PULMONARY ARTERY PRESSURE: 21 MMHG
WBC # BLD AUTO: 12.61 X10(3)/MCL (ref 4.5–11.5)
Z-SCORE OF LEFT VENTRICULAR DIMENSION IN END DIASTOLE: -2.44
Z-SCORE OF LEFT VENTRICULAR DIMENSION IN END SYSTOLE: -2.15

## 2024-10-22 PROCEDURE — 25000003 PHARM REV CODE 250

## 2024-10-22 PROCEDURE — 27200966 HC CLOSED SUCTION SYSTEM

## 2024-10-22 PROCEDURE — 80053 COMPREHEN METABOLIC PANEL: CPT

## 2024-10-22 PROCEDURE — 25000003 PHARM REV CODE 250: Performed by: STUDENT IN AN ORGANIZED HEALTH CARE EDUCATION/TRAINING PROGRAM

## 2024-10-22 PROCEDURE — 99900035 HC TECH TIME PER 15 MIN (STAT)

## 2024-10-22 PROCEDURE — 63600175 PHARM REV CODE 636 W HCPCS: Performed by: STUDENT IN AN ORGANIZED HEALTH CARE EDUCATION/TRAINING PROGRAM

## 2024-10-22 PROCEDURE — 94760 N-INVAS EAR/PLS OXIMETRY 1: CPT

## 2024-10-22 PROCEDURE — 36415 COLL VENOUS BLD VENIPUNCTURE: CPT | Performed by: GENERAL PRACTICE

## 2024-10-22 PROCEDURE — 99900031 HC PATIENT EDUCATION (STAT)

## 2024-10-22 PROCEDURE — 63600175 PHARM REV CODE 636 W HCPCS

## 2024-10-22 PROCEDURE — 94003 VENT MGMT INPAT SUBQ DAY: CPT

## 2024-10-22 PROCEDURE — 83735 ASSAY OF MAGNESIUM: CPT

## 2024-10-22 PROCEDURE — 25000003 PHARM REV CODE 250: Performed by: NURSE PRACTITIONER

## 2024-10-22 PROCEDURE — 27000207 HC ISOLATION

## 2024-10-22 PROCEDURE — 20000000 HC ICU ROOM

## 2024-10-22 PROCEDURE — 85025 COMPLETE CBC W/AUTO DIFF WBC: CPT

## 2024-10-22 PROCEDURE — 36415 COLL VENOUS BLD VENIPUNCTURE: CPT

## 2024-10-22 PROCEDURE — 25000003 PHARM REV CODE 250: Performed by: GENERAL PRACTICE

## 2024-10-22 PROCEDURE — 27100171 HC OXYGEN HIGH FLOW UP TO 24 HOURS

## 2024-10-22 PROCEDURE — 87040 BLOOD CULTURE FOR BACTERIA: CPT | Performed by: GENERAL PRACTICE

## 2024-10-22 PROCEDURE — 99900026 HC AIRWAY MAINTENANCE (STAT)

## 2024-10-22 PROCEDURE — 99233 SBSQ HOSP IP/OBS HIGH 50: CPT | Mod: ,,, | Performed by: GENERAL PRACTICE

## 2024-10-22 PROCEDURE — 94761 N-INVAS EAR/PLS OXIMETRY MLT: CPT

## 2024-10-22 RX ORDER — METOCLOPRAMIDE HYDROCHLORIDE 5 MG/5ML
10 SOLUTION ORAL
Status: DISCONTINUED | OUTPATIENT
Start: 2024-10-23 | End: 2024-10-28

## 2024-10-22 RX ORDER — SCOLOPAMINE TRANSDERMAL SYSTEM 1 MG/1
1 PATCH, EXTENDED RELEASE TRANSDERMAL
Status: DISCONTINUED | OUTPATIENT
Start: 2024-10-22 | End: 2024-11-01

## 2024-10-22 RX ADMIN — LINEZOLID 600 MG: 600 TABLET, FILM COATED ORAL at 08:10

## 2024-10-22 RX ADMIN — RIVAROXABAN 20 MG: 10 TABLET, FILM COATED ORAL at 08:10

## 2024-10-22 RX ADMIN — MUPIROCIN: 20 OINTMENT TOPICAL at 08:10

## 2024-10-22 RX ADMIN — METOPROLOL TARTRATE 25 MG: 25 TABLET, FILM COATED ORAL at 03:10

## 2024-10-22 RX ADMIN — AMIODARONE HYDROCHLORIDE 0.5 MG/MIN: 1.8 INJECTION, SOLUTION INTRAVENOUS at 01:10

## 2024-10-22 RX ADMIN — ATORVASTATIN CALCIUM 10 MG: 10 TABLET, FILM COATED ORAL at 08:10

## 2024-10-22 RX ADMIN — CEFEPIME 2 G: 2 INJECTION, POWDER, FOR SOLUTION INTRAVENOUS at 12:10

## 2024-10-22 RX ADMIN — SCOPOLAMINE 1 PATCH: 1 PATCH TRANSDERMAL at 08:10

## 2024-10-22 RX ADMIN — QUETIAPINE FUMARATE 25 MG: 25 TABLET ORAL at 08:10

## 2024-10-22 RX ADMIN — METOPROLOL TARTRATE 25 MG: 25 TABLET, FILM COATED ORAL at 08:10

## 2024-10-22 RX ADMIN — CEFEPIME 2 G: 2 INJECTION, POWDER, FOR SOLUTION INTRAVENOUS at 07:10

## 2024-10-22 RX ADMIN — GUAIFENESIN AND DEXTROMETHORPHAN 5 ML: 100; 10 SYRUP ORAL at 01:10

## 2024-10-22 RX ADMIN — PANTOPRAZOLE SODIUM 40 MG: 40 INJECTION, POWDER, LYOPHILIZED, FOR SOLUTION INTRAVENOUS at 08:10

## 2024-10-22 RX ADMIN — CEFEPIME 2 G: 2 INJECTION, POWDER, FOR SOLUTION INTRAVENOUS at 04:10

## 2024-10-22 RX ADMIN — DAPTOMYCIN 555 MG: 500 INJECTION, POWDER, LYOPHILIZED, FOR SOLUTION INTRAVENOUS at 01:10

## 2024-10-22 NOTE — CONSULTS
[facility] - [facility] ICU  Wound Care  Consult Note    Patient Name: Delio Daniel Jr.  MRN: [id number]  Admission Date: 10/20/2024  Hospital Length of Stay: 2 days  Attending Physician: Itz Francisco MD  Primary Care Provider: Jl Briones MD     Inpatient consult to Wound Care Physician  Consult performed by: Liz Dewitt FNP  Consult ordered by: Kenneth Bhardwaj MD        Subjective:     History of Present Illness:  Wound medicine consult    The patient is a 69 year old male who presented to [facility] ED on 10/20/24 from  [facility] Home with decreased responsiveness, sepsis, and recurrent UTI. Noted to be hypotensive with Afib and RVR, also requiring mechanical ventilation and admitted to the ICU.   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 Patient is non-verbal at baseline, contracted upper and lower extremities on left side. 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   Blood cultures on admission + Enterococcus faecium - VRE per BCID. Urine culture with Klebsiella and Proteus. ID guiding antibiotic stewardship, currently receiving Daptomycin, Linezolid, and cefepime.     Patient admitted with unstageable pressure ulcer of sacrum; seen by inpatient wound nurse and treatment was iniatated, wound medicine consulted for evaluation and possible debridement.   No family present at time of assessment, all information gained through chart review. In June 2024 appears that patient was seen by wound  for sacral pressure ulcer, no visit notes or images availabe from this time frame. First image of sacral region in May 2024 with wound of sacrum/coccyx. In July 2024 images appears that wound had healed with remaining dark discoloration of sacrum/buttocks and then again noted with wound in  images of late August 2024; appears to have evolved and worsened up to this point.  CT abdomen/pelvis with contrast - 10/21/24 - interval development of sacral decubitus just to the right of the superior aspect of the vertebral fold. No abscess of fluid collection is seen. No convincing evidence of osteomyelitis seen.   10/22/24 - initial evaluation of wound today with Dr. Carpenter. Met patient in room IN01, he is awake, non-verbal with trach on mechanical ventilation. Alone in room on ICU low air loss specialty bed. Assisted by ICU nurse and wound clinic team.  Difficult to reposition because of contractures. No acute distress.            Scheduled Meds:   atorvastatin  10 mg Per G Tube Daily    ceFEPime IV (PEDS and ADULTS)  2 g Intravenous Q8H    DAPTOmycin (CUBICIN) IV (PEDS and ADULTS)  8 mg/kg Intravenous Q24H    linezolid  600 mg Oral Q12H    metoprolol tartrate  25 mg Per G Tube TID    mupirocin   Nasal BID    pantoprazole  40 mg Intravenous Daily    QUEtiapine  25 mg Per G Tube BID    rivaroxaban  20 mg Per G Tube Nightly     Continuous Infusions:  PRN Meds:  Current Facility-Administered Medications:     acetaminophen, 650 mg, Per G Tube, Q6H PRN    dextromethorphan-guaiFENesin  mg/5 ml, 5 mL, Per G Tube, Q4H PRN    dextrose 10%, 12.5 g, Intravenous, PRN    dextrose 10%, 25 g, Intravenous, PRN    glucagon (human recombinant), 1 mg, Intramuscular, PRN    hydrALAZINE, 10 mg, Intravenous, Q4H PRN    metoprolol, 5 mg, Intravenous, Q5 Min PRN    naloxone, 0.02 mg, Intravenous, PRN    sodium chloride 0.9%, 10 mL, Intravenous, Q12H PRN    Review of patient's allergies indicates:  No Known Allergies     Past Medical History:   Diagnosis Date    Arthritis     Atrial fibrillation     BPH (benign prostatic hyperplasia)     Cardiac arrest     Coronary artery disease     Cyst, kidney, acquired     Diverticulosis     Hyperlipidemia     Hypertension     MI (myocardial infarction)     Obesity     Steatosis of liver      Stroke      Past Surgical History:   Procedure Laterality Date    A-V CARDIAC PACEMAKER INSERTION Right     CARDIAC CATHETERIZATION      COLONOSCOPY W/ BIOPSIES      CRANIECTOMY Right 12/20/2023    Procedure: CRANIECTOMY;  Surgeon: Artem Can MD;  Location: [facility];  Service: Neurosurgery;  Laterality: Right;    ESOPHAGOGASTRODUODENOSCOPY W/ PEG N/A 1/2/2024    Procedure: PEG;  Surgeon: Tani Day MD;  Location: [facility] ENDOSCOPY;  Service: Gastroenterology;  Laterality: N/A;    excision of colon      TRACHEOSTOMY N/A 12/29/2023    Procedure: CREATION, TRACHEOSTOMY;  Surgeon: Patricia Winslow MD;  Location: [facility];  Service: ENT;  Laterality: N/A;  REQ 1130 //  NEEDS 2 SCRUBS       Family History       Problem Relation (Age of Onset)    Hypertension Mother, Father, Sister          Tobacco Use    Smoking status: Never    Smokeless tobacco: Never   Substance and Sexual Activity    Alcohol use: Not Currently    Drug use: Not Currently    Sexual activity: Not Currently     Partners: Female     Review of Systems   Unable to perform ROS: Patient nonverbal       Objective:     Vital Signs (Most Recent):  Temp: 97.8 °F (36.6 °C) (10/22/24 1200)  Pulse: 83 (10/22/24 1500)  Resp: (!) 23 (10/22/24 1500)  BP: 100/68 (10/22/24 1500)  SpO2: 95 % (10/22/24 1500) Vital Signs (24h Range):  Temp:  [97.8 °F (36.6 °C)-100 °F (37.8 °C)] 97.8 °F (36.6 °C)  Pulse:  [] 83  Resp:  [17-30] 23  SpO2:  [88 %-100 %] 95 %  BP: ()/(57-79) 100/68     Weight: 69.1 kg (152 lb 5.4 oz)  Body mass index is 22.49 kg/m².     Physical Exam  Vitals reviewed.   HENT:      Head:      Comments: Right bone flap with indentation, no apparent swelling       Neck:      Comments: Tracheostomy   Pulmonary:      Comments: Mechanical ventilation; + cough    Abdominal:      Comments: PEG   Skin:     General: Skin is warm and dry.      Capillary Refill: Capillary refill takes less than 2 seconds.      Findings: Wound present.              Comments: Sacral ulcer covered with brown/green necrotic tissue with odor. Able to palpate bone but remains covered with thin layer of tissue. No evidence of purulent drainage; efren-wound skin without signs of cellulitis     Left 1st and 2nd toe abrasions    Neurological:      Comments: Left upper and lower extremity contracted  Moves right upper extremity freely and reaches to grab onto nurse,  does not follow command        Sacrum: 7 x 9 x 2 cm with undermining at 3 o'clock - 2 cm       Left lateral knee: 1.5 x 2 x 0.2 cm   medial knee abrasion with alan-epithelial covering       Left lateral ankle: 0.6 x 0.7 x 0.1 cm       Left 1st and 2nd toe               Laboratory:  A1C:   Recent Labs   Lab 08/26/24  1221   HGBA1C 5.3     ABGs:   Recent Labs   Lab 10/21/24  0625   PH 7.460*   PCO2 35.0   HCO3 24.9     BMP:   Recent Labs   Lab 10/22/24  0409   *   K 3.7   *   CO2 21*   BUN 28.1*   CREATININE 0.82   CALCIUM 8.5*   MG 2.40       CBC:   Recent Labs   Lab 10/22/24  0409   WBC 12.61*   RBC 3.36*   HGB 8.7*   HCT 30.5*      MCV 90.8   MCH 25.9*   MCHC 28.5*     CMP:   Recent Labs   Lab 10/22/24  0409   CALCIUM 8.5*   ALBUMIN 1.9*   *   K 3.7   CO2 21*   *   BUN 28.1*   CREATININE 0.82   ALKPHOS 94   ALT 10   AST 12   BILITOT 0.5       LFTs:   Recent Labs   Lab 10/22/24  0409   ALT 10   AST 12   ALKPHOS 94   BILITOT 0.5   ALBUMIN 1.9*       Microbiology Results (last 7 days)       Procedure Component Value Units Date/Time    Blood culture #2 **CANNOT BE ORDERED STAT** [[id number]]  (Abnormal) Collected: 10/20/24 1603    Order Status: Completed Specimen: Blood Updated: 10/22/24 1126     Blood Culture Susceptibility To Follow      Enterococcus faecium      Klebsiella pneumoniae     GRAM STAIN Gram Positive Cocci, probable Streptococcus      Seen in gram stain of broth only      2 of 2 bottles positive    Blood Culture [[id number]] Collected: 10/22/24 7351    Order Status: Resulted  Specimen: Blood from Hand, Left Updated: 10/22/24 0808    Blood Culture [[id number]] Collected: 10/22/24 0758    Order Status: Resulted Specimen: Blood from Hand, Right Updated: 10/22/24 0805    Urine culture [[id number]] Collected: 10/20/24 1654    Order Status: Completed Specimen: Urine Updated: 10/22/24 0725     Urine Culture No Growth    Respiratory Culture [[id number]]  (Abnormal) Collected: 10/20/24 2234    Order Status: Completed Specimen: Sputum Updated: 10/22/24 0717     Respiratory Culture Many Gram-negative Rods     Comment: with normal respiratory niyah        GRAM STAIN Quality 3+      Moderate Gram Negative Rods      Many Gram Positive Rods      Rare Yeast    Blood culture #1 **CANNOT BE ORDERED STAT** [[id number]]  (Normal) Collected: 10/20/24 1603    Order Status: Completed Specimen: Blood Updated: 10/21/24 1701     Blood Culture No Growth At 24 Hours    BCID2 Panel [[id number]]  (Abnormal) Collected: 10/20/24 1603    Order Status: Completed Specimen: Blood Updated: 10/21/24 1146     CTX-M (ESBL ) N/A     IMP (Cabapenemase ) N/A     KPC resistance gene (Carbapenemase ) N/A     mcr-1 N/A     mecA ID N/A     Comment: Note: Antimicrobial resistance can occur via multiple mechanisms. A Not Detected result for antimicrobial resistance gene(s) does not indicate antimicrobial susceptibility. Subculturing is required for species identification and susceptibility testing of   isolates.        mecA/C and MREJ (MRSA) gene N/A     NDM (Carbapenemase ) N/A     OXA-48-like (Carbapenemase ) N/A     Sofi/B (VRE gene) Detected     VIM (Carbapenemase ) N/A     Enterococcus faecalis Not Detected     Enterococcus faecium Detected     Listeria monocytogenes Not Detected     Staphylococcus spp. Not Detected     Staphylococcus aureus Not Detected     Staphylococcus epidermidis Not Detected     Staphylococcus lugdunensis Not Detected     Streptococcus spp. Not Detected      Streptococcus agalactiae (Group B) Not Detected     Streptococcus pneumoniae Not Detected     Streptococcus pyogenes (Group A) Not Detected     Acinetobacter calcoaceticus/baumannii complex Not Detected     Bacteroides fragilis Not Detected     Enterobacterales Not Detected     Enterobacter cloacae complex Not Detected     Escherichia coli Not Detected     Klebsiella aerogenes Not Detected     Klebsiella oxytoca Not Detected     Klebsiella pneumoniae group Not Detected     Proteus spp. Not Detected     Salmonella spp. Not Detected     Serratia marcescens Not Detected     Haemophilus influenzae Not Detected     Neisseria meningitidis Not Detected     Pseudomonas aeruginosa Not Detected     Stenotrophomonas maltophilia Not Detected     Candida albicans Not Detected     Candida auris Not Detected     Candida glabrata Not Detected     Candida krusei Not Detected     Candida parapsilosis Not Detected     Candida tropicalis Not Detected     Cryptococcus neoformans/gattii Not Detected    Narrative:      The [employer] BCID2 Panel is a multiplexed nucleic acid test intended for the use with [employer] or [employer] for the simultaneous qualitative detection and identification of multiple bacterial and yeast nucleic acids and select genetic determinants associated with antimicrobial resistance.  The [employer] BCID2 Panel test is performed directly on blood culture samples identified as positive by a continuous monitoring blood culture system.  Results are intended to be interpreted in conjunction with Gram stain results.            Recent Labs   Lab 10/20/24  2245   COLORU Yellow   PHUR 5.5   PROTEINUA 2+*   BACTERIA Occasional*   NITRITE Negative   LEUKOCYTESUR 250*   UROBILINOGEN Normal       Diagnostic Results:  I have reviewed all pertinent imaging results/findings within the past 24 hours.      CT Abdomen Pelvis With IV Contrast NO Oral Contrast  Status: Final result     MyChart Results  Release    MyChart Status: Active  Results Release     PACS Images for [employer] Viewer     Show images for CT Abdomen Pelvis With IV Contrast NO Oral Contrast  CT Abdomen Pelvis With IV Contrast NO Oral Contrast  Order: [id number]  Status: Final result       Visible to patient: Yes (not seen)       Next appt: 08/21/2025 at 07:15 AM in Radiology ([facility] OI-CT2 500 LB LIMIT)    0 Result Notes  Details    Reading Physician Reading Date Result Priority   Nellie Grant MD  [phone] 10/21/2024 Routine     Narrative & Impression  EXAMINATION:  CT ABDOMEN PELVIS WITH IV CONTRAST     CLINICAL HISTORY:  sacral ulcer with possible abscess/osteomyelitis;     TECHNIQUE:  Low dose axial images, sagittal and coronal reformations were obtained from the lung bases to the pubic symphysis following the IV administration of contrast. Automatic exposure control (AEC) is utilized to reduce patient radiation exposure.     COMPARISON:  08/19/2024     FINDINGS:  There are interstitial infiltrate seen in the lungs bilaterally with developing areas of consolidation in the lower lobes.  These are worse than prior examination.     There is a gastrostomy tube in the stomach.     The liver appears normal.  No liver mass or lesion is seen.  Portal and hepatic veins appear normal.     The gallbladder appears normal.  No obvious gallstones are seen.  No biliary dilatation is seen.  No pericholecystic fluid is seen.     The pancreas appears normal.  No pancreatic mass or lesion is seen.     The spleen shows no acute abnormality.     The adrenal glands appear normal.  No adrenal nodule is seen.     The kidneys appear normal in size.  There is a cyst in the midpole of the right kidney.  There is a cyst in the midpole the left kidney.  No hydronephrosis is seen.  No hydroureter is seen.  No nephrolithiasis is seen.  No obvious ureteral stones are seen.     The urinary bladder wall is diffusely thickened and there are inflammatory  changes associated with the urinary bladder.  Findings are consistent with cystitis.     No colitis is seen.  No diverticulitis is seen.  No obvious colonic mass or lesion is seen.     No free air is seen.  No free fluid is seen.     There is a sacral decubitus seen just to the right of the superior gluteal fold.  This is new since the prior examination.  No abscess or fluid collection is seen.  Some skin thickening inflammatory changes are seen.  No convincing evidence of osteomyelitis is seen.     Impression:     Interval development of sacral decubitus just to the right of the superior aspect of the vertebral fold.  No abscess or fluid collection is seen     Findings seen consistent with urinary bladder wall thickening and inflammatory changes consistent with cystitis     Interval development of bilateral patchy areas of pneumonia with developing consolidation in the lower lobes        Electronically signed by:Erick Grant  Date:                                            10/21/2024  Time:                                           17:22        Exam Ended: 10/21/24 17:05 CDT       Physical Exam  Assessment/Plan:       Unstageable pressure ulcer of sacrum - present on admission - possible source of infection   Stage 3 pressure ulcer of left knee - present on admission  Stage 4 pressure ulcer of left lateral ankle - present on admission  CVA with residual cognitive/motor deficits; contractures   VRE bacteremia  Klebsiella bacteremia  Tracheostomy and PEG tube dependent        PLAN:    Chart reviewed, patient examined and wounds assessed.  Opinion: This patient suffered from MCA CVA with hemorrhagic conversion requiring decompressive hemicraniotomy in December 2023. He has left sided paralysis with contractures, cognitive/expressive deficits, requiring trach and PEG. He has not had any meaningful neurologic recovery. He developed a sacral wound back in May 2024 that appeared to have healed in the interim but  recurred late August and has since evolved and worsened. Today it is unstageable with necrotic green slough covering with odor; possible source of infection/sepsis. Bedside debridement attempted by wound care physician today but unable to perform adequate debridement due to poor positioning of patient with contractures and ongoing stool production while repositioning. Would require operative debridement of this wound pending treatment goals.   Agree with palliative care evaluation.   Left knee and ankle wounds without signs of infection. Cleanse with Vashe, apply mupirocin ointment and cover with foam border daily.   Wound care orders: cleanse with Vashe, apply Vashe moistened gauze to wound bed, cover with ABD pad and secure with cloth tape, BID and PRN.   Monitor for signs & symptoms of deterioration  Offloading of sacrum/buttocks/heels at all times: YOLETTE mattress, turning q 2 hrs; use of wedges and heel offloading devices to be used at all times while in bed; ( CHATA Zavala). This needs to be reinforced by every staff nurse caring for patient on every shift of every day.  Incontinence: control moisture/wound contamination: No briefs; use things such as purewick or underwood catheter; rectal tube  Nutrition: Recommend aggressive nutritional support, protein supplementation along with vitamin and mineral supplements  and shaji to support wound healing; Follow RD recommendations for tube feeds  Will try to follow weekly while admitted, but every nurse assigned to patient on every shift of every day needs to address daily wound care dressing changes and offloading modalities including using heel offloading devices, wedges, YOLETTE mattress etc  Discussed with patient as well as nurse caring for patient today        The time spent including preparing to see the patient, obtaining patient history and assessment, evaluation of the plan of care, patient/caregiver counseling and education, orders, documentation, coordination of  care, and other professional medical management activities for today's encounter was  105   minutes           Thank you for your consult. I will follow-up with patient. Please contact us if you have any additional questions.    TRUMNA Romeo  Wound Care  [facility] - [facility] ICU

## 2024-10-22 NOTE — PROGRESS NOTES
[facility] - [facility] ICU    Cardiology  Progress Note    Patient Name: Delio Daniel Jr.  MRN: [id number]  Admission Date: 10/20/2024  Hospital Length of Stay: 2 days  Code Status: Full Code   Attending Provider: Itz Francisco MD   Consulting Provider: TRUMAN Erickson  Primary Care Physician: Jl Briones MD  Principal Problem:UTI (urinary tract infection)    Patient information was obtained from past medical records, ER records, and primary team.     Subjective:   Chief Complaint/Reason for Consult: AF    HPI:   Mr. Daniel is a 68 y/o male with a history of AFIB on Xarelto, CVA, SSS, HTN, HLD, CAD, who is known to [facility], Dr. Kimbrough. He presented to the ER on 8.3.24 from [facility] with decreased responsiveness, severe sepsis, and recurrent UTI. 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 Patient nonverbal at baseline. In the ED, patient was febrile, tachycardic with -190s, tachypneic, /60. EKG showed Afib with RVR. Diltiazem drip started in ED for acute HR Control. Patient required mechanical ventilation and admitted to the ICU. Patient treated for sepsis related to UTI. [facility] consulted for AF Management.     Hospital Course:  10.22.24: Resting in bed. AF CVR HR 70's. On Amiodarone Infusion. BP Controlled. Trach/Vented, FIO2 40%.    PMH: Arthritis, AF, BPH, Cardiac Arrest, CAD, Renal Cyst, Diverticulosis, Hyperlipidemia, Hypertension, MI, Obesity, Lever Steatosis, Stroke  PSH: Pacemaker, LHC, Colonoscopy, EGD, Colon Excision, Tracheostomy  Family History: Mother - HTN; Sister - HTN; Brother - HTN   Social History: Tobacco- Negative, Alcohol- Negative, Substance Abuse- Negative    Previous Cardiac Diagnostics:   Echocardiogram (5.3.24):  EF 65%  RV with Normal RV Function.  MR- Mild. TR- Mild.   Pericardial Effusion- None. Anterior Fat Pad Noted.     ECHO (1.15.24):  TDS. No Definity contrast available for  Addended by: GEOFFREY BONILLA on: 10/5/2021 03:12 PM     Modules accepted: Orders     use. Left Ventricle: The left ventricle is normal in size. Moderately increased wall thickness. Normal wall motion. There is normal systolic function. Ejection fraction by visual approximation is 55%. Right Ventricle: Normal right ventricular cavity size. Systolic function is borderline low. Left Atrium: Left atrium is severely dilated. Right Atrium: Right atrium is mildly dilated. Mitral Valve: There is bileaflet sclerosis. There is mild regurgitation. IVC/SVC: Normal venous pressure at 3 mmHg.     Venous US LUE (12.26.23):  There was no evidence of deep vein thrombosis in the left upper extremity.   A superficial thrombosis was identified in the left cephalic vein.      Carotid US (12.19.23):  The right internal carotid artery demonstrated less than 50% stenosis.  The left internal carotid artery demonstrated less than 50% stenosis.  The bilateral vertebral arteries were patent with antegrade flow.  Bilateral internal carotid arteries demonstrated decreased velocities starting from the common carotid arteries.      LHC (5.25.18):   LM: Normal. Normal size and bifurcation. LAD: Abnormal. Large, mild atherosclerotic plaque, moderately large diagonals. Mid LAD 30% stenosis. The lesion was tubular and eccentric. LCX: abnormal and Large, mild atherosclerotic plaque, large OM with mild narrowing.OM 1 35% stenosis. RCA: normal. Large and no significant disease.        Review of patient's allergies indicates:  No Known Allergies  No current facility-administered medications on file prior to encounter.     Current Outpatient Medications on File Prior to Encounter   Medication Sig    ascorbic Acid (VITAMIN C) 500 mg CpSR 500 mg 2 (two) times daily. Per PEG tube    busPIRone (BUSPAR) 5 MG Tab 5 mg by Per G Tube route 3 (three) times daily.    cholestyramine (QUESTRAN) 4 gram packet 4 g once daily. Via PEG tube    cholestyramine-aspartame (QUESTRAN LIGHT) 4 gram PwPk 1 packet (4 g total) by Per G Tube route 2 (two) times  daily.    diltiaZEM (CARDIZEM) 60 MG tablet 60 mg by Per G Tube route every 6 (six) hours.    ferrous sulfate 300 mg (60 mg iron)/5 mL syrup Take 5 mLs (300 mg total) by mouth once daily.    finasteride (PROSCAR) 5 mg tablet Take 1 tablet (5 mg total) by mouth once daily.    furosemide (LASIX) 80 MG tablet 80 mg by Per G Tube route as needed (for Edema).    L. acidophilus/L.bulgaricus (FLORANEX ORAL) 1 packet by PEG Tube route Daily.    levetiracetam 500 mg/5 mL (5 mL) Soln 1,500 mg by Per G Tube route 2 (two) times daily. Nursing home reports medication hold by MD until level redraw on Monday.    LIPITOR 10 mg tablet 10 mg by Per G Tube route once daily.    metoclopramide HCl (REGLAN) 5 mg/5 mL Soln 10 mg by Per G Tube route 3 (three) times daily before meals.    metoprolol tartrate (LOPRESSOR) 100 MG tablet 100 mg by Per G Tube route 2 (two) times daily.    miconazole NITRATE 2 % (MICOTIN) 2 % top powder Apply topically 2 (two) times daily.    multivitamin (THERAGRAN) per tablet 1 tablet by Per G Tube route once daily.    pantoprazole (PROTONIX) 40 mg injection Inject 40 mg into the vein 2 (two) times daily.    polyethylene glycol (GLYCOLAX) 17 gram PwPk Take 17 g by mouth 2 (two) times daily as needed for Constipation.    protein supplement (PROMOD PROTEIN ORAL) 30 mLs by Per G Tube route once daily.    QUEtiapine (SEROQUEL) 25 MG Tab 25 mg by Per G Tube route 2 (two) times daily.    scopolamine (TRANSDERM-SCOP) 1.3-1.5 mg (1 mg over 3 days) Place 1 patch onto the skin Every 3 (three) days.    sucralfate (CARAFATE) 1 gram tablet 1 tablet (1 g total) by Per G Tube route 4 (four) times daily before meals and nightly.    tamsulosin (FLOMAX) 0.4 mg Cap Take 1 capsule (0.4 mg total) by mouth every evening.    venlafaxine 75 mg TR24 1 tablet by Per G Tube route 2 (two) times a day.    XARELTO 20 mg Tab 1 tablet by Per G Tube route nightly.     Review of Systems   Unable to perform ROS: Patient nonverbal     Objective:      Vital Signs (Most Recent):  Temp: 97.8 °F (36.6 °C) (10/22/24 0742)  Pulse: 74 (10/22/24 1000)  Resp: 20 (10/22/24 1000)  BP: (!) 92/58 (10/22/24 1000)  SpO2: 97 % (10/22/24 1000) Vital Signs (24h Range):  Temp:  [97.8 °F (36.6 °C)-100 °F (37.8 °C)] 97.8 °F (36.6 °C)  Pulse:  [] 74  Resp:  [18-30] 20  SpO2:  [88 %-100 %] 97 %  BP: ()/(57-79) 92/58   Weight: 69.1 kg (152 lb 5.4 oz)  Body mass index is 22.49 kg/m².  SpO2: 97 %       Intake/Output Summary (Last 24 hours) at 10/22/2024 1112  Last data filed at 10/22/2024 0742  Gross per 24 hour   Intake 580.67 ml   Output 1225 ml   Net -644.33 ml     Lines/Drains/Airways       Drain  Duration                  Gastrostomy/Enterostomy 01/02/24 1230  days         Urethral Catheter 10/20/24 2210 Silicone 16 Fr. 1 day              Airway  Duration             Adult Surgical Airway 08/19/24 0120 Shiley Extra Large Cuffed Distal 6.0/ 75mm 64 days              Peripheral Intravenous Line  Duration                  Midline Catheter - Single Lumen 10/20/24 1530 Right 1 day         Peripheral IV - Single Lumen 10/20/24 1600 18 G Anterior;Distal;Left Upper Arm 1 day         Peripheral IV - Single Lumen 10/21/24 Anterior;Right Upper Arm 1 day                  Significant Labs:   Chemistries:   Recent Labs   Lab 10/20/24  1603 10/20/24  1943 10/21/24  0147 10/22/24  0409   *  --  154* 153*   K 5.1  --  3.7 3.7   *  --  125* 124*   CO2 19*  --  20* 21*   BUN 45.3*  --  31.7* 28.1*   CREATININE 1.48*  --  1.01 0.82   CALCIUM 8.4*  --  8.2* 8.5*   BILITOT 0.8  --  0.6 0.5   ALKPHOS 105  --  90 94   ALT 17  --  14 10   AST 18  --  13 12   GLUCOSE 157*  --  121* 83   MG 2.60  --  2.40 2.40   TROPONINI 0.118* 0.094*  --   --         CBC/Anemia Labs: Coa:    Recent Labs   Lab 10/20/24  1603 10/21/24  0147 10/22/24  0409   WBC 16.52  16.52* 12.69  12.69* 12.61*   HGB 11.5* 8.6* 8.7*   HCT 37.1* 28.2* 30.5*    198 178   MCV 84.7 85.5 90.8   RDW  "17.5* 17.2* 17.4*    No results for input(s): "PT", "INR", "APTT" in the last 168 hours.     Significant Imaging:  Imaging Results              X-Ray Chest AP Portable (Final result)  Result time 10/20/24 19:36:31      Final result by Sami Taylor MD (10/20/24 19:36:31)                   Impression:      No acute cardiopulmonary process identified.      Electronically signed by: Sami Taylor  Date:    10/20/2024  Time:    19:36               Narrative:    EXAMINATION:  XR CHEST AP PORTABLE    CLINICAL HISTORY:  Sepsis, unspecified organism    TECHNIQUE:  One view    COMPARISON:  August 21, 2020.    FINDINGS:  Cardiopericardial silhouette appearance is similar.  Tracheostomy cannula is in similar location.  Right chest implanted cardiac device electrodes terminate within the right atrium and right ventricle.  No acute dense focal or segmental consolidation, congestive process, pleural effusions or pneumothorax.                                      Telemetry:  AF CVR    Physical Exam  Vitals and nursing note reviewed.   Constitutional:       General: He is not in acute distress.     Appearance: He is ill-appearing.   Neck:      Comments: Tracheostomy  Cardiovascular:      Rate and Rhythm: Normal rate. Rhythm irregular.   Pulmonary:      Effort: Pulmonary effort is normal. No respiratory distress.      Comments: Mechanical Ventilation- Vent associated breath sounds. Fio2 40%  Abdominal:      Palpations: Abdomen is soft.   Musculoskeletal:      Right lower leg: No edema.      Left lower leg: No edema.      Comments: Legs are Warm.   Skin:     General: Skin is warm and dry.   Neurological:      Mental Status: Mental status is at baseline.       Home Medications:   No current facility-administered medications on file prior to encounter.     Current Outpatient Medications on File Prior to Encounter   Medication Sig Dispense Refill    ascorbic Acid (VITAMIN C) 500 mg CpSR 500 mg 2 (two) times daily. Per PEG tube      " busPIRone (BUSPAR) 5 MG Tab 5 mg by Per G Tube route 3 (three) times daily.      cholestyramine (QUESTRAN) 4 gram packet 4 g once daily. Via PEG tube      cholestyramine-aspartame (QUESTRAN LIGHT) 4 gram PwPk 1 packet (4 g total) by Per G Tube route 2 (two) times daily. 180 packet 3    diltiaZEM (CARDIZEM) 60 MG tablet 60 mg by Per G Tube route every 6 (six) hours.      ferrous sulfate 300 mg (60 mg iron)/5 mL syrup Take 5 mLs (300 mg total) by mouth once daily. 450 mL 0    finasteride (PROSCAR) 5 mg tablet Take 1 tablet (5 mg total) by mouth once daily. 30 tablet 11    furosemide (LASIX) 80 MG tablet 80 mg by Per G Tube route as needed (for Edema).      L. acidophilus/L.bulgaricus (FLORANEX ORAL) 1 packet by PEG Tube route Daily.      levetiracetam 500 mg/5 mL (5 mL) Soln 1,500 mg by Per G Tube route 2 (two) times daily. Nursing home reports medication hold by MD until level redraw on Monday.      LIPITOR 10 mg tablet 10 mg by Per G Tube route once daily.      metoclopramide HCl (REGLAN) 5 mg/5 mL Soln 10 mg by Per G Tube route 3 (three) times daily before meals.      metoprolol tartrate (LOPRESSOR) 100 MG tablet 100 mg by Per G Tube route 2 (two) times daily.      miconazole NITRATE 2 % (MICOTIN) 2 % top powder Apply topically 2 (two) times daily.      multivitamin (THERAGRAN) per tablet 1 tablet by Per G Tube route once daily.      pantoprazole (PROTONIX) 40 mg injection Inject 40 mg into the vein 2 (two) times daily.      polyethylene glycol (GLYCOLAX) 17 gram PwPk Take 17 g by mouth 2 (two) times daily as needed for Constipation.      protein supplement (PROMOD PROTEIN ORAL) 30 mLs by Per G Tube route once daily.      QUEtiapine (SEROQUEL) 25 MG Tab 25 mg by Per G Tube route 2 (two) times daily.      scopolamine (TRANSDERM-SCOP) 1.3-1.5 mg (1 mg over 3 days) Place 1 patch onto the skin Every 3 (three) days.      sucralfate (CARAFATE) 1 gram tablet 1 tablet (1 g total) by Per G Tube route 4 (four) times daily  before meals and nightly.      tamsulosin (FLOMAX) 0.4 mg Cap Take 1 capsule (0.4 mg total) by mouth every evening. 30 capsule 11    venlafaxine 75 mg TR24 1 tablet by Per G Tube route 2 (two) times a day.      XARELTO 20 mg Tab 1 tablet by Per G Tube route nightly.       Current Schedule Inpatient Medications:   atorvastatin  10 mg Per G Tube Daily    ceFEPime IV (PEDS and ADULTS)  2 g Intravenous Q8H    DAPTOmycin (CUBICIN) IV (PEDS and ADULTS)  8 mg/kg Intravenous Q24H    linezolid  600 mg Oral Q12H    metoprolol tartrate  25 mg Per G Tube TID    mupirocin   Nasal BID    pantoprazole  40 mg Intravenous Daily    QUEtiapine  25 mg Per G Tube BID    rivaroxaban  20 mg Per G Tube Nightly     Continuous Infusions:      Assessment:   Persistent AF - Currently AF CVR    - [id number] Score 4 (4.8% Stroke Risk per Year)    - on Xarelto for Stroke Risk Reduction  CAD    - [facility] (5.25.18): LM: Normal. Normal size and bifurcation. LAD: Abnormal. Large, mild atherosclerotic plaque, moderately large diagonals. Mid LAD 30% stenosis. The lesion was tubular and eccentric. LCX: abnormal and Large, mild atherosclerotic plaque, large OM with mild narrowing.OM 1 35% stenosis. RCA: normal. Large and no significant disease.      - EF 65% (May 2024)   NSTEMI Type II due to Sepsis/UTI   SSS    - Dual Chamber ICD  Hypertension (BP Low Normal Systolic Readings)  Hyperlipidemia  Sepsis/ E. Faecium VRE    - Leukocytosis    - Chest XR: No Acute Process    - Sacral Wound  UTI  Anemia   History of Hemorrhagic CVA (12/2023)    - Residual Left Sided Deficits    - S/P Peg Tube and Trach   Small Left Pleural Effusion   Arthritis  Dysphagia    - s/p PEG   BRIAN (Resolved)  Hypernatremia  History of Seizure Disorder  Liver Steatosis  Diverticulosis  BPH  Depression  Obesity   No known history of GI Bleed     Plan:   Discontinue Amiodarone Infusion   Continue Metoprolol Tartrate 25 Mg TID Per Peg- with Hold Parameters  Total Digoxin 0.75 mg given on  10.21.24.  On Xarelto for stroke risk reduction  Consider Palliative care consultation for goals of care discussion.   Can give addition 0.25 mg IV Digoxin if needed.  Follow up Echo Results.  Antibiotic Management as per Primary Team.    Thank you for your consult.     TRUMAN Erickson  Cardiology  [facility] - [facility] ICU  10/22/2024     Physician addendum:  I have seen and examined this patient as a split-shared visit with the JESSICA d/t complicated medical management of above problems written in assessment and high acuity requiring physician expertise in medical decision-making. I performed the substantive portion of the history and exam. Above medical decision-making is also formulated by me.    Cardiovascular exam:  S1, S2  Lungs:  fine crackles at bases.  Extremities:  + edema bilaterally    Plan:  Discontinue amiodarone.  Use digoxin if needed.  Overall guarded prognosis.  Medications as above.  Continue supportive therapy.     uYriy Navarrete MD  Cardiologist

## 2024-10-22 NOTE — PROGRESS NOTES
[facility] - Emergency Dept  Pulmonary Critical Care Note    Patient Name: Delio Daniel Jr.  MRN: [id number]  Admission Date: 10/20/2024  Hospital Length of Stay: 2 days  Code Status: Full Code  Attending Provider: Itz Francisco MD  Primary Care Provider: Jl Briones MD     Subjective:     HPI:   Patient is a 70 y/o male with extensive PMH who presented from NH on 10/20/24 with decreased responsiveness, severe sepsis, and recurrent UTI. 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     Patient transferred to ED from [facility] with lethargy and tachycardia. Family at bedside reports patient is nonverbal at baseline but typically more alert. In the ED, patient is febrile, tachycardic with -190s, tachypneic, /60. EKG shows Afib with RVR. Diltiazem drip started in ED. Labs are significant for WBC of 16.5, lactic acid of 3.3, Cr 1.48, BUN 45.3, Na 155, K+ 5.1, troponin elevated at 0.118. UA with evidence of UTI. Of note, pt was being treated outpatient for a UTI, currently on day 4 of 7 day Rocephin course. Urine culture 10/16/24 with multidrug resistant Klebsiella pneumonia and Proteus mirabilis with susceptibility to Cefepime. Patient received 3L of NS and initiated on Vanc and Cefepime in ED. Admitted to the ICU on mechanical ventilation via trach.      Hospital Course/Significant events:  10/20/24: Admitted to ICU for severe sepsis     24 Hour Interval History:  NAEON. Night nurse reported coughing. Patient appears comfortable this morning. Stable. HR 90s in A Fib. UOP 1180.     Past Medical History:   Diagnosis Date    Arthritis     Atrial fibrillation     BPH (benign  prostatic hyperplasia)     Cardiac arrest     Coronary artery disease     Cyst, kidney, acquired     Diverticulosis     Hyperlipidemia     Hypertension     MI (myocardial infarction)     Obesity     Steatosis of liver     Stroke        Past Surgical History:   Procedure Laterality Date    A-V CARDIAC PACEMAKER INSERTION Right     CARDIAC CATHETERIZATION      COLONOSCOPY W/ BIOPSIES      CRANIECTOMY Right 12/20/2023    Procedure: CRANIECTOMY;  Surgeon: Artem Can MD;  Location: [facility] OR;  Service: Neurosurgery;  Laterality: Right;    ESOPHAGOGASTRODUODENOSCOPY W/ PEG N/A 1/2/2024    Procedure: PEG;  Surgeon: Tani Day MD;  Location: [facility] ENDOSCOPY;  Service: Gastroenterology;  Laterality: N/A;    excision of colon      TRACHEOSTOMY N/A 12/29/2023    Procedure: CREATION, TRACHEOSTOMY;  Surgeon: Patricia Winslow MD;  Location: [facility] OR;  Service: ENT;  Laterality: N/A;  REQ 1130 //  NEEDS 2 SCRUBS       Social History     Socioeconomic History    Marital status:     Number of children: 9   Occupational History    Occupation: retired   Tobacco Use    Smoking status: Never    Smokeless tobacco: Never   Substance and Sexual Activity    Alcohol use: Not Currently    Drug use: Not Currently    Sexual activity: Not Currently     Partners: Female     Social Drivers of Health     Financial Resource Strain: Patient Unable To Answer (10/21/2024)    Overall Financial Resource Strain (CARDIA)     Difficulty of Paying Living Expenses: Patient unable to answer   Food Insecurity: Patient Unable To Answer (10/21/2024)    Hunger Vital Sign     Worried About Running Out of Food in the Last Year: Patient unable to answer     Ran Out of Food in the Last Year: Patient unable to answer   Transportation Needs: Patient Unable To Answer (10/21/2024)    TRANSPORTATION NEEDS     Transportation : Patient unable to answer   Physical Activity: Sufficiently Active (8/5/2024)    Exercise Vital Sign     Days of Exercise per  Week: 5 days     Minutes of Exercise per Session: 30 min   Stress: Patient Unable To Answer (10/21/2024)    [employer] - Occupational Stress Questionnaire     Feeling of Stress : Patient unable to answer   Housing Stability: Patient Unable To Answer (10/21/2024)    Housing Stability Vital Sign     Unable to Pay for Housing in the Last Year: Patient unable to answer     Homeless in the Last Year: Patient unable to answer           Current Outpatient Medications   Medication Instructions    ascorbic Acid (VITAMIN C) 500 mg, 2 times daily, Per PEG tube    busPIRone (BUSPAR) 5 mg, Per G Tube, 3 times daily    cholestyramine (QUESTRAN) 4 gram packet 4 g, Daily, Via PEG tube    cholestyramine-aspartame (QUESTRAN LIGHT) 4 gram PwPk 4 g, Per G Tube, 2 times daily    diltiaZEM (CARDIZEM) 60 mg, Per G Tube, Every 6 hours    ferrous sulfate 300 mg, Oral, Daily    finasteride (PROSCAR) 5 mg, Oral, Daily    furosemide (LASIX) 80 mg, Per G Tube, As needed (PRN)    L. acidophilus/L.bulgaricus (FLORANEX ORAL) 1 packet, PEG Tube, Daily    levetiracetam 1,500 mg, Per G Tube, 2 times daily, Nursing home reports medication hold by MD until level redraw on Monday.    LIPITOR 10 mg, Per G Tube, Daily    metoclopramide HCl (REGLAN) 10 mg, Per G Tube, 3 times daily before meals    metoprolol tartrate (LOPRESSOR) 100 mg, Per G Tube, 2 times daily    miconazole NITRATE 2 % (MICOTIN) 2 % top powder Topical (Top), 2 times daily    multivitamin (THERAGRAN) per tablet 1 tablet, Per G Tube, Daily    pantoprazole (PROTONIX) 40 mg, Intravenous, 2 times daily    polyethylene glycol (GLYCOLAX) 17 g, Oral, 2 times daily PRN    protein supplement (PROMOD PROTEIN ORAL) 30 mLs, Per G Tube, Daily    QUEtiapine (SEROQUEL) 25 mg, Per G Tube, 2 times daily    scopolamine (TRANSDERM-SCOP) 1.3-1.5 mg (1 mg over 3 days) 1 patch, Transdermal, Every 3 days    sucralfate (CARAFATE) 1 g, Per G Tube, Before meals & nightly    tamsulosin  (FLOMAX) 0.4 mg, Oral, Nightly    venlafaxine 75 mg TR24 1 tablet, Per G Tube, 2 times daily    XARELTO 20 mg Tab 1 tablet, Per G Tube, Nightly       Current Inpatient Medications   atorvastatin  10 mg Per G Tube Daily    ceFEPime IV (PEDS and ADULTS)  2 g Intravenous Q8H    DAPTOmycin (CUBICIN) IV (PEDS and ADULTS)  8 mg/kg Intravenous Q24H    linezolid  600 mg Oral Q12H    metoprolol tartrate  25 mg Per G Tube TID    mupirocin   Nasal BID    pantoprazole  40 mg Intravenous Daily    QUEtiapine  25 mg Per G Tube BID    rivaroxaban  20 mg Per G Tube Nightly       Current Intravenous Infusions   amiodarone in dextrose 5%  0.5 mg/min Intravenous Continuous 16.7 mL/hr at 10/22/24 0138 0.5 mg/min at 10/22/24 0138         Review of Systems   Unable to perform ROS: Mental status change          Objective:       Intake/Output Summary (Last 24 hours) at 10/22/2024 0507  Last data filed at 10/22/2024 0400  Gross per 24 hour   Intake 3214.24 ml   Output 1305 ml   Net 1909.24 ml         Vital Signs (Most Recent):  Temp: 99 °F (37.2 °C) (10/22/24 0400)  Pulse: 91 (10/22/24 0400)  Resp: 20 (10/22/24 0400)  BP: 95/64 (10/22/24 0400)  SpO2: (!) 93 % (10/22/24 0436)  Body mass index is 22.49 kg/m².  Weight: 69.1 kg (152 lb 5.4 oz) Vital Signs (24h Range):  Temp:  [98.6 °F (37 °C)-100 °F (37.8 °C)] 99 °F (37.2 °C)  Pulse:  [] 91  Resp:  [18-40] 20  SpO2:  [88 %-100 %] 93 %  BP: ()/(57-79) 95/64     Physical Exam  Constitutional:       General: He is not in acute distress.     Appearance: He is ill-appearing.      Comments: Thin, Chronically ill-appearing   HENT:      Mouth/Throat:      Mouth: Mucous membranes are dry.   Cardiovascular:      Rate and Rhythm: Tachycardia present. Rhythm irregular.   Abdominal:      General: There is no distension.      Palpations: Abdomen is soft.      Comments: PEG tube in place    Musculoskeletal:      Comments: LUE and LLE in contracture    Skin:     General: Skin is warm and dry.       Comments: Large sacral pressure ulcer. BLE with scattered superficial abrasions.    Neurological:      Comments: Non-sedated. Nonverbal at baseline. No spontaneous eye opening. Corneal reflex intact. Does not follow commands. Winces and withdraws to painful stimuli in RUE and RLE.           Lines/Drains/Airways       Drain  Duration                  Gastrostomy/Enterostomy 01/02/24 1230  days         Urethral Catheter 10/20/24 2210 Silicone 16 Fr. 1 day              Airway  Duration             Adult Surgical Airway 08/19/24 0120 Shiley Extra Large Cuffed Distal 6.0/ 75mm 64 days              Peripheral Intravenous Line  Duration                  Midline Catheter - Single Lumen 10/20/24 1530 Right 1 day         Peripheral IV - Single Lumen 10/20/24 1600 18 G Anterior;Distal;Left Upper Arm 1 day         Peripheral IV - Single Lumen 10/21/24 Anterior;Right Upper Arm 1 day                    Significant Labs:    Lab Results   Component Value Date    WBC 12.69 (H) 10/21/2024    WBC 12.69 10/21/2024    HGB 8.6 (L) 10/21/2024    HCT 28.2 (L) 10/21/2024    MCV 85.5 10/21/2024     10/21/2024           BMP  Lab Results   Component Value Date     (H) 10/21/2024    K 3.7 10/21/2024    CO2 20 (L) 10/21/2024    BUN 31.7 (H) 10/21/2024    CREATININE 1.01 10/21/2024    CALCIUM 8.2 (L) 10/21/2024    AGAP 9.0 10/21/2024    EGFRNONAA 61 04/23/2022         ABG  Recent Labs   Lab 10/21/24  0625   PH 7.460*   PO2 63.0*   PCO2 35.0   HCO3 24.9   POCBASEDEF 1.30       Mechanical Ventilation Support:  Vent Mode: A/C (10/22/24 0436)  Ventilator Initiated: Yes (10/20/24 1546)  Set Rate: 20 BPM (10/22/24 0436)  Vt Set: 500 mL (10/22/24 0436)  PEEP/CPAP: 5 cmH20 (10/22/24 0436)  Oxygen Concentration (%): 40 (10/22/24 0436)  Peak Airway Pressure: 20 cmH20 (10/22/24 0436)  Total Ve: 6.9 L/m (10/22/24 7866)  F/VT Ratio<105 (RSBI): (!) 53.6 (10/21/24 9670)    Significant Imaging:  I have reviewed the pertinent imaging within  the past 24 hours.    CT Abdomen Pelvis With IV Contrast NO Oral Contrast  Result Date: 10/21/2024  Interval development of sacral decubitus just to the right of the superior aspect of the vertebral fold.  No abscess or fluid collection is seen Findings seen consistent with urinary bladder wall thickening and inflammatory changes consistent with cystitis Interval development of bilateral patchy areas of pneumonia with developing consolidation in the lower lobes Electronically signed by: Erick Grant Date:    10/21/2024 Time:    17:22      Assessment/Plan:     Assessment  Severe sepsis, E faecium VRE per BCID  Blood cultures 10/20: 2/2 bottles positive, GPC, BCID: E faecium, VRE  Urine cultures 10/20, NG@24h  UTI  Afib with RVR  Hypernatremia   Sacral wound      Plan  Admitted to ICU   On mechanical ventilation via trach   Remains on amio gtt  CIS following, digoxin 500 mcg and 250 mcg given yesterday, started lopressor 25 mg TID  Repeat blood cultures today every 48 hours until negative  Urine culture NG@24h, Resp culture pending  ID following, continue cefepime, daptomycin, and linezolid, pending final speciation and sensitivities  Wound care consulted for pressure ulcer of the sacrum     DVT Prophylaxis: SCDs, continue home Xarelto   GI Prophylaxis: PPI     32 minutes of critical care was time spent personally by me on the following activities: development of treatment plan with patient or surrogate and bedside caregivers, discussions with consultants, evaluation of patient's response to treatment, examination of patient, ordering and performing treatments and interventions, ordering and review of laboratory studies, ordering and review of radiographic studies, pulse oximetry, re-evaluation of patient's condition.  This critical care time did not overlap with that of any other provider or involve time for any procedures.     Amina Dolan MD  Pulmonary Critical Care Medicine  [facility] - Emergency  Dept  DOS: 10/22/2024

## 2024-10-22 NOTE — PLAN OF CARE
Problem: Skin Injury Risk Increased  Goal: Skin Health and Integrity  Outcome: Progressing     Problem: Adult Inpatient Plan of Care  Goal: Plan of Care Review  Outcome: Progressing  Goal: Patient-Specific Goal (Individualized)  Outcome: Progressing  Goal: Absence of Hospital-Acquired Illness or Injury  Outcome: Progressing  Goal: Optimal Comfort and Wellbeing  Outcome: Progressing  Goal: Readiness for Transition of Care  Outcome: Progressing     Problem: Infection  Goal: Absence of Infection Signs and Symptoms  Outcome: Progressing     Problem: Sepsis/Septic Shock  Goal: Optimal Coping  Outcome: Progressing  Goal: Absence of Bleeding  Outcome: Progressing  Goal: Blood Glucose Level Within Targeted Range  Outcome: Progressing  Goal: Absence of Infection Signs and Symptoms  Outcome: Progressing  Goal: Optimal Nutrition Intake  Outcome: Progressing     Problem: Pneumonia  Goal: Fluid Balance  Outcome: Progressing  Goal: Resolution of Infection Signs and Symptoms  Outcome: Progressing  Goal: Effective Oxygenation and Ventilation  Outcome: Progressing     Problem: Fall Injury Risk  Goal: Absence of Fall and Fall-Related Injury  Outcome: Progressing

## 2024-10-22 NOTE — PROGRESS NOTES
Infectious Disease  Progress Note    Patient Name: Delio Daniel Jr.   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 On admission, noted to be hypotensive with Afib and RVR, recently had positive urine culture with Klebsiella and Proteus. He also has an advanced sacral ulcer. He was noted to have bacteremia with 2/4 of admission blood cultures being positive for Enterococcus faecium and ultimately noting it is VRE per BCID. ID consulted for assistance in management.      Bacteremia  VRE bacteremia  Klebsiella bacteremia  CVA with residual cognitive deficits, motor deficits  Tracheostomy and PEG tube dependent  Sacral ulcer   Recurrent UTI  Pacemaker In place  History of V fib arrest     Recommendations:  -Continue Daptomycin 8mg/Kg IV q24h, may need to increase to 10mg/Kg  -Continue Cefepime 2g IV q8h  -Continue Linezolid 600mg per PEG q12h  -discussed with wound care physician, concerning appearance of the sacral ulcer, deep, tunneling, foul smell, needs debridement in OR should goals of care be aligned with this course of management, in which case will need consult to General surgery  -Presence of the ICD is concerning in the setting of this bacteremia  -TTE, no evidence of vegetations on the ICD, patient will however possibly need MENDEL given concerning nature of the  bacteremia  -Repeat blood cultures  -patient is quite ill with multiple comorbidities, has been declining and worsening over the past year, appropriate to engage goals of care conversation with the patient's family and consider palliative care evaluation to help determine those goals of care     Thank you for your consult. ID will continue following. Please contact us with any questions or concerns.     Antibiotics History:  Cefepime 10/20 - Present  Vancomycin 10/20  Daptomycin 10/21 - Present  Linezolid 10/21 - Present        Portions of this note dictated using EMR integrated voice recognition software, and may be subject to voice recognition errors not corrected at proofreading. Please contact writer for clarification if needed.    Subjective:     Principal Problem: UTI (urinary tract infection)     Interval History:   Clinically unchanged, unable to provide much history, remains poorly interactive with the evaluation    Review of Systems   Review of Systems   Unable to perform ROS: Medical condition        Objective:     Vital Signs (Most Recent):  Temp: 97.8 °F (36.6 °C) (10/22/24 0742)  Pulse: 92 (10/22/24 0800)  Resp: (!) 28 (10/22/24 0800)  BP: 106/75 (10/22/24 0800)  SpO2: (!) 94 % (10/22/24 0800)  Vital Signs (24h Range):  Temp:  [97.8 °F (36.6 °C)-100 °F (37.8 °C)] 97.8 °F (36.6 °C)  Pulse:  [] 92  Resp:  [18-30] 28  SpO2:  [88 %-100 %] 94 %  BP: ()/(57-79) 106/75      Weight:   Wt Readings from Last 1 Encounters:   10/21/24 69.1 kg (152 lb 5.4 oz)      Body mass index is Body mass index is 22.49 kg/m².     Estimated Creatinine Clearance: Estimated Creatinine Clearance: 84.3 mL/min (based on SCr of 0.82 mg/dL).     Lines/Drains/Airways       Drain  Duration                  Gastrostomy/Enterostomy 01/02/24 1230  days         Urethral Catheter 10/20/24 2210 Silicone 16 Fr. 1 day              Airway  Duration             Adult Surgical Airway 08/19/24 0120 Shiley Extra Large Cuffed  Distal 6.0/ 75mm 64 days              Peripheral Intravenous Line  Duration                  Midline Catheter - Single Lumen 10/20/24 1530 Right 1 day         Peripheral IV - Single Lumen 10/20/24 1600 18 G Anterior;Distal;Left Upper Arm 1 day         Peripheral IV - Single Lumen 10/21/24 Anterior;Right Upper Arm 1 day                     Physical Exam  Physical Exam  Constitutional:       Appearance: He is ill-appearing (Chronically ill-appearing).   HENT:      Head: Normocephalic and atraumatic.      Mouth/Throat:      Pharynx: No oropharyngeal exudate or posterior oropharyngeal erythema.   Eyes:      Extraocular Movements: Extraocular movements intact.      Pupils: Pupils are equal, round, and reactive to light.   Cardiovascular:      Rate and Rhythm: Normal rate and regular rhythm.      Heart sounds: No murmur heard.  Pulmonary:      Effort: No respiratory distress.      Breath sounds: No wheezing, rhonchi or rales.      Comments: Tracheostomy in place  Abdominal:      General: Bowel sounds are normal. There is no distension.      Palpations: Abdomen is soft.      Tenderness: There is no abdominal tenderness.      Comments: Peg tube in place   Musculoskeletal:         General: No swelling or tenderness.      Cervical back: Neck supple. No rigidity or tenderness.      Comments: Deep sacral ulcer, evaluated with the wound care physician today, slough, tunneling, foul-smelling, contaminated with feces   Lymphadenopathy:      Cervical: No cervical adenopathy.   Skin:     Findings: No lesion or rash.   Neurological:      Mental Status: He is alert.      Comments: At baseline, nonverbal, poorly participating with evaluation, left-sided contractures, not following command          Significant Labs: CBC:   Recent Labs   Lab 10/20/24  1603 10/21/24  0147 10/22/24  0409   WBC 16.52  16.52* 12.69  12.69* 12.61*   HGB 11.5* 8.6* 8.7*   HCT 37.1* 28.2* 30.5*    198 178     CMP:   Recent Labs   Lab 10/20/24  1603  10/21/24  0147 10/22/24  0409   * 154* 153*   K 5.1 3.7 3.7   * 125* 124*   CO2 19* 20* 21*   BUN 45.3* 31.7* 28.1*   CREATININE 1.48* 1.01 0.82   CALCIUM 8.4* 8.2* 8.5*   ALBUMIN 2.6* 2.1* 1.9*   BILITOT 0.8 0.6 0.5   ALKPHOS 105 90 94   AST 18 13 12   ALT 17 14 10       Microbiology Results (last 7 days)       Procedure Component Value Units Date/Time    Blood Culture [[id number]] Collected: 10/22/24 0758    Order Status: Resulted Specimen: Blood from Hand, Left Updated: 10/22/24 0808    Blood Culture [[id number]] Collected: 10/22/24 0758    Order Status: Resulted Specimen: Blood from Hand, Right Updated: 10/22/24 0805    Urine culture [[id number]] Collected: 10/20/24 1654    Order Status: Completed Specimen: Urine Updated: 10/22/24 0725     Urine Culture No Growth    Respiratory Culture [[id number]]  (Abnormal) Collected: 10/20/24 2234    Order Status: Completed Specimen: Sputum Updated: 10/22/24 0717     Respiratory Culture Many Gram-negative Rods     Comment: with normal respiratory niyah        GRAM STAIN Quality 3+      Moderate Gram Negative Rods      Many Gram Positive Rods      Rare Yeast    Blood culture #1 **CANNOT BE ORDERED STAT** [[id number]]  (Normal) Collected: 10/20/24 1603    Order Status: Completed Specimen: Blood Updated: 10/21/24 1701     Blood Culture No Growth At 24 Hours    Blood culture #2 **CANNOT BE ORDERED STAT** [[id number]]  (Abnormal) Collected: 10/20/24 1603    Order Status: Completed Specimen: Blood Updated: 10/21/24 1146     GRAM STAIN Gram Positive Cocci, probable Streptococcus      Seen in gram stain of broth only      2 of 2 bottles positive    BCID2 Panel [[id number]]  (Abnormal) Collected: 10/20/24 1603    Order Status: Completed Specimen: Blood Updated: 10/21/24 1146     CTX-M (ESBL ) N/A     IMP (Cabapenemase ) N/A     KPC resistance gene (Carbapenemase ) N/A     mcr-1 N/A     mecA ID N/A     Comment: Note: Antimicrobial resistance can  occur via multiple mechanisms. A Not Detected result for antimicrobial resistance gene(s) does not indicate antimicrobial susceptibility. Subculturing is required for species identification and susceptibility testing of   isolates.        mecA/C and MREJ (MRSA) gene N/A     NDM (Carbapenemase ) N/A     OXA-48-like (Carbapenemase ) N/A     Sofi/B (VRE gene) Detected     VIM (Carbapenemase ) N/A     Enterococcus faecalis Not Detected     Enterococcus faecium Detected     Listeria monocytogenes Not Detected     Staphylococcus spp. Not Detected     Staphylococcus aureus Not Detected     Staphylococcus epidermidis Not Detected     Staphylococcus lugdunensis Not Detected     Streptococcus spp. Not Detected     Streptococcus agalactiae (Group B) Not Detected     Streptococcus pneumoniae Not Detected     Streptococcus pyogenes (Group A) Not Detected     Acinetobacter calcoaceticus/baumannii complex Not Detected     Bacteroides fragilis Not Detected     Enterobacterales Not Detected     Enterobacter cloacae complex Not Detected     Escherichia coli Not Detected     Klebsiella aerogenes Not Detected     Klebsiella oxytoca Not Detected     Klebsiella pneumoniae group Not Detected     Proteus spp. Not Detected     Salmonella spp. Not Detected     Serratia marcescens Not Detected     Haemophilus influenzae Not Detected     Neisseria meningitidis Not Detected     Pseudomonas aeruginosa Not Detected     Stenotrophomonas maltophilia Not Detected     Candida albicans Not Detected     Candida auris Not Detected     Candida glabrata Not Detected     Candida krusei Not Detected     Candida parapsilosis Not Detected     Candida tropicalis Not Detected     Cryptococcus neoformans/gattii Not Detected    Narrative:      The [employer] BCID2 Panel is a multiplexed nucleic acid test intended for the use with [employer]® 2.0 or [employer]ico® "[employer] Systems for the simultaneous qualitative detection and  identification of multiple bacterial and yeast nucleic acids and select genetic determinants associated with antimicrobial resistance.  The [employer] BCID2 Panel test is performed directly on blood culture samples identified as positive by a continuous monitoring blood culture system.  Results are intended to be interpreted in conjunction with Gram stain results.             Significant Imaging: I have reviewed all pertinent imaging results/findings within the past 24 hours.      Kenneth Bhardwaj MD  Infectious Disease  [facility]

## 2024-10-22 NOTE — HPI
Wound medicine re-check    The patient is a 69 year old male who presented to [facility] ED on 10/20/24 from [facility] with decreased responsiveness, sepsis, and recurrent UTI. Noted to be hypotensive with Afib and RVR, also requiring mechanical ventilation and admitted to the ICU.   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 Patient is non-verbal at baseline, contracted upper and lower extremities on left side. 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   Blood cultures on admission + Enterococcus faecium - VRE per BCID. Urine culture with Klebsiella and Proteus. ID guiding antibiotic stewardship.     Patient admitted with unstageable pressure ulcer of sacrum; seen by inpatient wound nurse and treatment was iniatated, wound medicine consulted for evaluation and possible debridement.   No family present at time of assessment, all information gained through chart review. In June 2024 appears that patient was seen by wound  for sacral pressure ulcer, no visit notes or images availabe from this time frame. First image of sacral region in May 2024 with wound of sacrum/coccyx. In July 2024 images appears that wound had healed with remaining dark discoloration of sacrum/buttocks and then again noted with wound in images of late August 2024; appears to have evolved and worsened up to this point.  Initial evaluation of wounds for this encounter done on 10/22/24 - several pressure ulcers with most concerning is the sacral/coccyx unstageable ulcer. Also with fecal incontinence which is contaminating the wound as it is very close to the anus. He is contracted and we were unable to get full visualization and positioning for bedside debridement. Recommendations made for palliative care consultation  to discuss  overall goals of care.   Palliative care consulted and discussion held with family in which they have decided to continue with treatment.Palliative met with family again on 11/11 with wishes to continue with aggressive supportive measures with full code status.   Bedside debridement of sacral ulcer with Surgery on 11/2/24.   Wound Vac applied on 11/4/24  ID continues to follow,  guiding antibiotic stewardship. BC from 11/8 normal; BC from 11/12 preliminary no growth at 48 hours.   11/18/24 - wound re-check today. Met patient in room IN02, he is awake, non-verbal with trach on mechanical ventilation. He is on ICU low air loss bed with bilateral heel boots in place.  Accompanied by ICU nurse as well as inpatient wound nurse.  Difficult to reposition because of contractures. Febrile again today with noted increase of WBC from 7.5 on 11/17 to 16.01 on 11/18. No acute distress. Tolerates repositioning for wound care.

## 2024-10-22 NOTE — PLAN OF CARE
Clinical updates sent to patient's facility of residence, [facility]. Made facility aware there is no expected discharge date at this time.

## 2024-10-22 NOTE — SUBJECTIVE & OBJECTIVE
Scheduled Meds:   atorvastatin  10 mg Per G Tube Daily    ceFEPime IV (PEDS and ADULTS)  2 g Intravenous Q8H    DAPTOmycin (CUBICIN) IV (PEDS and ADULTS)  8 mg/kg Intravenous Q24H    linezolid  600 mg Oral Q12H    metoprolol tartrate  25 mg Per G Tube TID    mupirocin   Nasal BID    pantoprazole  40 mg Intravenous Daily    QUEtiapine  25 mg Per G Tube BID    rivaroxaban  20 mg Per G Tube Nightly     Continuous Infusions:  PRN Meds:  Current Facility-Administered Medications:     acetaminophen, 650 mg, Per G Tube, Q6H PRN    dextromethorphan-guaiFENesin  mg/5 ml, 5 mL, Per G Tube, Q4H PRN    dextrose 10%, 12.5 g, Intravenous, PRN    dextrose 10%, 25 g, Intravenous, PRN    glucagon (human recombinant), 1 mg, Intramuscular, PRN    hydrALAZINE, 10 mg, Intravenous, Q4H PRN    metoprolol, 5 mg, Intravenous, Q5 Min PRN    naloxone, 0.02 mg, Intravenous, PRN    sodium chloride 0.9%, 10 mL, Intravenous, Q12H PRN    Review of patient's allergies indicates:  No Known Allergies     Past Medical History:   Diagnosis Date    Arthritis     Atrial fibrillation     BPH (benign prostatic hyperplasia)     Cardiac arrest     Coronary artery disease     Cyst, kidney, acquired     Diverticulosis     Hyperlipidemia     Hypertension     MI (myocardial infarction)     Obesity     Steatosis of liver     Stroke      Past Surgical History:   Procedure Laterality Date    A-V CARDIAC PACEMAKER INSERTION Right     CARDIAC CATHETERIZATION      COLONOSCOPY W/ BIOPSIES      CRANIECTOMY Right 12/20/2023    Procedure: CRANIECTOMY;  Surgeon: Artem Can MD;  Location: [facility];  Service: Neurosurgery;  Laterality: Right;    ESOPHAGOGASTRODUODENOSCOPY W/ PEG N/A 1/2/2024    Procedure: PEG;  Surgeon: Tani Day MD;  Location: [facility] ENDOSCOPY;  Service: Gastroenterology;  Laterality: N/A;    excision of colon      TRACHEOSTOMY N/A 12/29/2023    Procedure: CREATION, TRACHEOSTOMY;  Surgeon: Patricia Winslow MD;  Location: [facility];   Service: ENT;  Laterality: N/A;  REQ 1130 //  NEEDS 2 SCRUBS       Family History       Problem Relation (Age of Onset)    Hypertension Mother, Father, Sister          Tobacco Use    Smoking status: Never    Smokeless tobacco: Never   Substance and Sexual Activity    Alcohol use: Not Currently    Drug use: Not Currently    Sexual activity: Not Currently     Partners: Female     Review of Systems   Unable to perform ROS: Patient nonverbal       Objective:     Vital Signs (Most Recent):  Temp: 97.8 °F (36.6 °C) (10/22/24 1200)  Pulse: 83 (10/22/24 1500)  Resp: (!) 23 (10/22/24 1500)  BP: 100/68 (10/22/24 1500)  SpO2: 95 % (10/22/24 1500) Vital Signs (24h Range):  Temp:  [97.8 °F (36.6 °C)-100 °F (37.8 °C)] 97.8 °F (36.6 °C)  Pulse:  [] 83  Resp:  [17-30] 23  SpO2:  [88 %-100 %] 95 %  BP: ()/(57-79) 100/68     Weight: 69.1 kg (152 lb 5.4 oz)  Body mass index is 22.49 kg/m².     Physical Exam  Vitals reviewed.   HENT:      Head:      Comments: Right bone flap with indentation, no apparent swelling       Neck:      Comments: Tracheostomy   Pulmonary:      Comments: Mechanical ventilation; + cough    Abdominal:      Comments: PEG   Skin:     General: Skin is warm and dry.      Capillary Refill: Capillary refill takes less than 2 seconds.      Findings: Wound present.             Comments: Sacral ulcer covered with brown/green necrotic tissue with odor. Able to palpate bone but remains covered with thin layer of tissue. No evidence of purulent drainage; efren-wound skin without signs of cellulitis     Left 1st and 2nd toe abrasions    Neurological:      Comments: Left upper and lower extremity contracted  Moves right upper extremity freely and reaches to grab onto nurse,  does not follow command        Sacrum: 7 x 9 x 2 cm with undermining at 3 o'clock - 2 cm       Left lateral knee: 1.5 x 2 x 0.2 cm   medial knee abrasion with alan-epithelial covering       Left lateral ankle: 0.6 x 0.7 x 0.1 cm       Left 1st  and 2nd toe               Laboratory:  A1C:   Recent Labs   Lab 08/26/24  1221   HGBA1C 5.3     ABGs:   Recent Labs   Lab 10/21/24  0625   PH 7.460*   PCO2 35.0   HCO3 24.9     BMP:   Recent Labs   Lab 10/22/24  0409   *   K 3.7   *   CO2 21*   BUN 28.1*   CREATININE 0.82   CALCIUM 8.5*   MG 2.40       CBC:   Recent Labs   Lab 10/22/24  0409   WBC 12.61*   RBC 3.36*   HGB 8.7*   HCT 30.5*      MCV 90.8   MCH 25.9*   MCHC 28.5*     CMP:   Recent Labs   Lab 10/22/24  0409   CALCIUM 8.5*   ALBUMIN 1.9*   *   K 3.7   CO2 21*   *   BUN 28.1*   CREATININE 0.82   ALKPHOS 94   ALT 10   AST 12   BILITOT 0.5       LFTs:   Recent Labs   Lab 10/22/24  0409   ALT 10   AST 12   ALKPHOS 94   BILITOT 0.5   ALBUMIN 1.9*       Microbiology Results (last 7 days)       Procedure Component Value Units Date/Time    Blood culture #2 **CANNOT BE ORDERED STAT** [[id number]]  (Abnormal) Collected: 10/20/24 1603    Order Status: Completed Specimen: Blood Updated: 10/22/24 1126     Blood Culture Susceptibility To Follow      Enterococcus faecium      Klebsiella pneumoniae     GRAM STAIN Gram Positive Cocci, probable Streptococcus      Seen in gram stain of broth only      2 of 2 bottles positive    Blood Culture [[id number]] Collected: 10/22/24 0758    Order Status: Resulted Specimen: Blood from Hand, Left Updated: 10/22/24 0808    Blood Culture [[id number]] Collected: 10/22/24 0758    Order Status: Resulted Specimen: Blood from Hand, Right Updated: 10/22/24 0805    Urine culture [[id number]] Collected: 10/20/24 1654    Order Status: Completed Specimen: Urine Updated: 10/22/24 0725     Urine Culture No Growth    Respiratory Culture [[id number]]  (Abnormal) Collected: 10/20/24 2234    Order Status: Completed Specimen: Sputum Updated: 10/22/24 0717     Respiratory Culture Many Gram-negative Rods     Comment: with normal respiratory niyah        GRAM STAIN Quality 3+      Moderate Gram Negative Rods      Many Gram  Positive Rods      Rare Yeast    Blood culture #1 **CANNOT BE ORDERED STAT** [[id number]]  (Normal) Collected: 10/20/24 1603    Order Status: Completed Specimen: Blood Updated: 10/21/24 1701     Blood Culture No Growth At 24 Hours    BCID2 Panel [[id number]]  (Abnormal) Collected: 10/20/24 1603    Order Status: Completed Specimen: Blood Updated: 10/21/24 1146     CTX-M (ESBL ) N/A     IMP (Cabapenemase ) N/A     KPC resistance gene (Carbapenemase ) N/A     mcr-1 N/A     mecA ID N/A     Comment: Note: Antimicrobial resistance can occur via multiple mechanisms. A Not Detected result for antimicrobial resistance gene(s) does not indicate antimicrobial susceptibility. Subculturing is required for species identification and susceptibility testing of   isolates.        mecA/C and MREJ (MRSA) gene N/A     NDM (Carbapenemase ) N/A     OXA-48-like (Carbapenemase ) N/A     Sofi/B (VRE gene) Detected     VIM (Carbapenemase ) N/A     Enterococcus faecalis Not Detected     Enterococcus faecium Detected     Listeria monocytogenes Not Detected     Staphylococcus spp. Not Detected     Staphylococcus aureus Not Detected     Staphylococcus epidermidis Not Detected     Staphylococcus lugdunensis Not Detected     Streptococcus spp. Not Detected     Streptococcus agalactiae (Group B) Not Detected     Streptococcus pneumoniae Not Detected     Streptococcus pyogenes (Group A) Not Detected     Acinetobacter calcoaceticus/baumannii complex Not Detected     Bacteroides fragilis Not Detected     Enterobacterales Not Detected     Enterobacter cloacae complex Not Detected     Escherichia coli Not Detected     Klebsiella aerogenes Not Detected     Klebsiella oxytoca Not Detected     Klebsiella pneumoniae group Not Detected     Proteus spp. Not Detected     Salmonella spp. Not Detected     Serratia marcescens Not Detected     Haemophilus influenzae Not Detected     Neisseria meningitidis Not  Detected     Pseudomonas aeruginosa Not Detected     Stenotrophomonas maltophilia Not Detected     Candida albicans Not Detected     Candida auris Not Detected     Candida glabrata Not Detected     Candida krusei Not Detected     Candida parapsilosis Not Detected     Candida tropicalis Not Detected     Cryptococcus neoformans/gattii Not Detected    Narrative:      The [employer] BCID2 Panel is a multiplexed nucleic acid test intended for the use with [employer]® 2.0 or [employer]® [employer] for the simultaneous qualitative detection and identification of multiple bacterial and yeast nucleic acids and select genetic determinants associated with antimicrobial resistance.  The [email or web address] BCID2 Panel test is performed directly on blood culture samples identified as positive by a continuous monitoring blood culture system.  Results are intended to be interpreted in conjunction with Gram stain results.            Recent Labs   Lab 10/20/24  2245   COLORU Yellow   PHUR 5.5   PROTEINUA 2+*   BACTERIA Occasional*   NITRITE Negative   LEUKOCYTESUR 250*   UROBILINOGEN Normal       Diagnostic Results:  I have reviewed all pertinent imaging results/findings within the past 24 hours.

## 2024-10-22 NOTE — PHYSICIAN QUERY
Please clarify if there is any clinical correlation between UTI and urinary device.    Unrelated to each other

## 2024-10-23 LAB
ACB COMPLEX DNA BLD POS QL NAA+NON-PROBE: NOT DETECTED
ALBUMIN SERPL-MCNC: 1.8 G/DL (ref 3.4–4.8)
ALBUMIN/GLOB SERPL: 0.4 RATIO (ref 1.1–2)
ALP SERPL-CCNC: 94 UNIT/L (ref 40–150)
ALT SERPL-CCNC: 8 UNIT/L (ref 0–55)
ANION GAP SERPL CALC-SCNC: 10 MEQ/L
AST SERPL-CCNC: 13 UNIT/L (ref 5–34)
B FRAGILIS DNA BLD POS QL NAA+PROBE: NOT DETECTED
BACTERIA BLD CULT: ABNORMAL
BACTERIA BLD CULT: ABNORMAL
BASOPHILS # BLD AUTO: 0.06 X10(3)/MCL
BASOPHILS NFR BLD AUTO: 0.6 %
BILIRUB SERPL-MCNC: 0.7 MG/DL
BUN SERPL-MCNC: 23.2 MG/DL (ref 8.4–25.7)
C ALBICANS DNA BLD POS QL NAA+PROBE: NOT DETECTED
C AURIS DNA BLD POS QL NAA+NON-PROBE: NOT DETECTED
C GATTII+NEOFOR DNA CSF QL NAA+NON-PROBE: NOT DETECTED
C GLABRATA DNA BLD POS QL NAA+PROBE: NOT DETECTED
C KRUSEI DNA BLD POS QL NAA+PROBE: NOT DETECTED
C PARAP DNA BLD POS QL NAA+PROBE: NOT DETECTED
C TROPICLS DNA BLD POS QL NAA+PROBE: NOT DETECTED
CALCIUM SERPL-MCNC: 8.1 MG/DL (ref 8.8–10)
CHLORIDE SERPL-SCNC: 124 MMOL/L (ref 98–107)
CO2 SERPL-SCNC: 18 MMOL/L (ref 23–31)
COLISTIN RES MCR-1 ISLT/SPM QL: ABNORMAL
CREAT SERPL-MCNC: 0.82 MG/DL (ref 0.72–1.25)
CREAT/UREA NIT SERPL: 28
E CLOAC COMP DNA BLD POS QL NAA+PROBE: NOT DETECTED
E COLI DNA BLD POS QL NAA+PROBE: NOT DETECTED
E FAECALIS+OTHR E SP RRNA BLD POS FISH: NOT DETECTED
E FAECIUM HSP60 BLD POS QL PROBE: DETECTED
ENTEROBACTERALES DNA BLD POS NAA+N-PRB: NOT DETECTED
EOSINOPHIL # BLD AUTO: 0.26 X10(3)/MCL (ref 0–0.9)
EOSINOPHIL NFR BLD AUTO: 2.6 %
ERYTHROCYTE [DISTWIDTH] IN BLOOD BY AUTOMATED COUNT: 17 % (ref 11.5–17)
ESBL CFT TO CFT-CLAV IC RTO BD POS IMP: ABNORMAL
GFR SERPLBLD CREATININE-BSD FMLA CKD-EPI: >60 ML/MIN/1.73/M2
GLOBULIN SER-MCNC: 4.3 GM/DL (ref 2.4–3.5)
GLUCOSE SERPL-MCNC: 84 MG/DL (ref 82–115)
GP B STREP DNA CSF QL NAA+NON-PROBE: NOT DETECTED
GRAM STN SPEC: ABNORMAL
HAEM INFLU DNA CSF QL NAA+NON-PROBE: NOT DETECTED
HCT VFR BLD AUTO: 29 % (ref 42–52)
HGB BLD-MCNC: 8.2 G/DL (ref 14–18)
IMM GRANULOCYTES # BLD AUTO: 0.08 X10(3)/MCL (ref 0–0.04)
IMM GRANULOCYTES NFR BLD AUTO: 0.8 %
IMP CARBAPENEMASE ISLT QL IA.RAPID: ABNORMAL
K OXYTOCA OMPA BLD POS QL PROBE: NOT DETECTED
KLEBSIELLA SP DNA BLD POS QL NAA+NON-PRB: DETECTED
KLEBSIELLA SP DNA BLD POS QL NAA+NON-PRB: NOT DETECTED
KPC CARBAPENEMASE ISLT QL IA.RAPID: ABNORMAL
L MONOCYTOG DNA CSF QL NAA+NON-PROBE: NOT DETECTED
LYMPHOCYTES # BLD AUTO: 1.42 X10(3)/MCL (ref 0.6–4.6)
LYMPHOCYTES NFR BLD AUTO: 14.4 %
MAGNESIUM SERPL-MCNC: 2.4 MG/DL (ref 1.6–2.6)
MCH RBC QN AUTO: 25.7 PG (ref 27–31)
MCHC RBC AUTO-ENTMCNC: 28.3 G/DL (ref 33–36)
MCV RBC AUTO: 90.9 FL (ref 80–94)
MECA+MECC NOSE QL NAA+PROBE: ABNORMAL
MECA+MECC+MREJ ISLT/SPM QL: ABNORMAL
MONOCYTES # BLD AUTO: 0.49 X10(3)/MCL (ref 0.1–1.3)
MONOCYTES NFR BLD AUTO: 5 %
N MEN DNA CSF QL NAA+NON-PROBE: NOT DETECTED
NDM CARBAPENEMASE ISLT QL IA.RAPID: ABNORMAL
NEUTROPHILS # BLD AUTO: 7.52 X10(3)/MCL (ref 2.1–9.2)
NEUTROPHILS NFR BLD AUTO: 76.6 %
NRBC BLD AUTO-RTO: 0 %
OXA-48-LIKE CRBPNASE ISLT QL IA.RAPID: ABNORMAL
P AERUGINOSA DNA BLD POS QL NAA+PROBE: NOT DETECTED
PLATELET # BLD AUTO: 175 X10(3)/MCL (ref 130–400)
PMV BLD AUTO: 11.9 FL (ref 7.4–10.4)
POCT GLUCOSE: 100 MG/DL (ref 70–110)
POCT GLUCOSE: 99 MG/DL (ref 70–110)
POTASSIUM SERPL-SCNC: 3.3 MMOL/L (ref 3.5–5.1)
PROT SERPL-MCNC: 6.1 GM/DL (ref 5.8–7.6)
PROTEUS SP DNA BLD POS QL NAA+PROBE: NOT DETECTED
RBC # BLD AUTO: 3.19 X10(6)/MCL (ref 4.7–6.1)
S AUREUS DNA BLD POS QL NAA+PROBE: NOT DETECTED
S ENT+BONG DNA STL QL NAA+NON-PROBE: NOT DETECTED
S EPIDERMIDIS HSP60 BLD POS QL PROBE: NOT DETECTED
S LUGDUNENSIS SODA BLD POS QL PROBE: NOT DETECTED
S MALTOPH DNA BLD POS QL NAA+PROBE: NOT DETECTED
S MARCESCENS DNA BLD POS QL NAA+PROBE: NOT DETECTED
S PNEUM DNA CSF QL NAA+NON-PROBE: NOT DETECTED
S PYOGENES HSP60 BLD POS QL PROBE: NOT DETECTED
SODIUM SERPL-SCNC: 152 MMOL/L (ref 136–145)
STAPH SP TUF BLD POS QL PROBE: NOT DETECTED
STREPTOCOCCUS SP TUF BLD POS QL PROBE: NOT DETECTED
VAN(A+B+C1+C2) GENES ISLT/SPM: DETECTED
VIM CARBAPENEMASE ISLT QL IA.RAPID: ABNORMAL
WBC # BLD AUTO: 9.83 X10(3)/MCL (ref 4.5–11.5)

## 2024-10-23 PROCEDURE — 83735 ASSAY OF MAGNESIUM: CPT

## 2024-10-23 PROCEDURE — 25000003 PHARM REV CODE 250

## 2024-10-23 PROCEDURE — 27100171 HC OXYGEN HIGH FLOW UP TO 24 HOURS

## 2024-10-23 PROCEDURE — 25000003 PHARM REV CODE 250: Performed by: STUDENT IN AN ORGANIZED HEALTH CARE EDUCATION/TRAINING PROGRAM

## 2024-10-23 PROCEDURE — 27200966 HC CLOSED SUCTION SYSTEM

## 2024-10-23 PROCEDURE — 94003 VENT MGMT INPAT SUBQ DAY: CPT

## 2024-10-23 PROCEDURE — 20000000 HC ICU ROOM

## 2024-10-23 PROCEDURE — 63600175 PHARM REV CODE 636 W HCPCS

## 2024-10-23 PROCEDURE — 94761 N-INVAS EAR/PLS OXIMETRY MLT: CPT

## 2024-10-23 PROCEDURE — 93005 ELECTROCARDIOGRAM TRACING: CPT

## 2024-10-23 PROCEDURE — 99900031 HC PATIENT EDUCATION (STAT)

## 2024-10-23 PROCEDURE — 80053 COMPREHEN METABOLIC PANEL: CPT

## 2024-10-23 PROCEDURE — 25000003 PHARM REV CODE 250: Performed by: GENERAL PRACTICE

## 2024-10-23 PROCEDURE — 63600175 PHARM REV CODE 636 W HCPCS: Performed by: GENERAL PRACTICE

## 2024-10-23 PROCEDURE — 99223 1ST HOSP IP/OBS HIGH 75: CPT | Mod: GC,,, | Performed by: SURGERY

## 2024-10-23 PROCEDURE — 99900022

## 2024-10-23 PROCEDURE — 99233 SBSQ HOSP IP/OBS HIGH 50: CPT | Mod: ,,, | Performed by: GENERAL PRACTICE

## 2024-10-23 PROCEDURE — 99900026 HC AIRWAY MAINTENANCE (STAT)

## 2024-10-23 PROCEDURE — 63600175 PHARM REV CODE 636 W HCPCS: Performed by: STUDENT IN AN ORGANIZED HEALTH CARE EDUCATION/TRAINING PROGRAM

## 2024-10-23 PROCEDURE — 85025 COMPLETE CBC W/AUTO DIFF WBC: CPT

## 2024-10-23 PROCEDURE — 99900035 HC TECH TIME PER 15 MIN (STAT)

## 2024-10-23 PROCEDURE — 27000207 HC ISOLATION

## 2024-10-23 PROCEDURE — 36415 COLL VENOUS BLD VENIPUNCTURE: CPT

## 2024-10-23 PROCEDURE — 93010 ELECTROCARDIOGRAM REPORT: CPT | Mod: ,,, | Performed by: INTERNAL MEDICINE

## 2024-10-23 PROCEDURE — 25000003 PHARM REV CODE 250: Performed by: NURSE PRACTITIONER

## 2024-10-23 PROCEDURE — 94760 N-INVAS EAR/PLS OXIMETRY 1: CPT

## 2024-10-23 RX ORDER — LINEZOLID 2 MG/ML
600 INJECTION, SOLUTION INTRAVENOUS
Status: COMPLETED | OUTPATIENT
Start: 2024-10-23 | End: 2024-11-05

## 2024-10-23 RX ADMIN — PANTOPRAZOLE SODIUM 40 MG: 40 INJECTION, POWDER, LYOPHILIZED, FOR SOLUTION INTRAVENOUS at 07:10

## 2024-10-23 RX ADMIN — LINEZOLID 600 MG: 600 TABLET, FILM COATED ORAL at 07:10

## 2024-10-23 RX ADMIN — CEFEPIME 2 G: 2 INJECTION, POWDER, FOR SOLUTION INTRAVENOUS at 07:10

## 2024-10-23 RX ADMIN — METOPROLOL TARTRATE 25 MG: 25 TABLET, FILM COATED ORAL at 02:10

## 2024-10-23 RX ADMIN — METOPROLOL TARTRATE 25 MG: 25 TABLET, FILM COATED ORAL at 08:10

## 2024-10-23 RX ADMIN — METOCLOPRAMIDE HYDROCHLORIDE 10 MG: 5 SOLUTION ORAL at 11:10

## 2024-10-23 RX ADMIN — QUETIAPINE FUMARATE 25 MG: 25 TABLET ORAL at 07:10

## 2024-10-23 RX ADMIN — METOCLOPRAMIDE HYDROCHLORIDE 10 MG: 5 SOLUTION ORAL at 03:10

## 2024-10-23 RX ADMIN — CEFEPIME 2 G: 2 INJECTION, POWDER, FOR SOLUTION INTRAVENOUS at 12:10

## 2024-10-23 RX ADMIN — ATORVASTATIN CALCIUM 10 MG: 10 TABLET, FILM COATED ORAL at 07:10

## 2024-10-23 RX ADMIN — LINEZOLID 600 MG: 600 INJECTION, SOLUTION INTRAVENOUS at 09:10

## 2024-10-23 RX ADMIN — QUETIAPINE FUMARATE 25 MG: 25 TABLET ORAL at 08:10

## 2024-10-23 RX ADMIN — METOPROLOL TARTRATE 25 MG: 25 TABLET, FILM COATED ORAL at 07:10

## 2024-10-23 RX ADMIN — SODIUM CHLORIDE 2 G: 9 INJECTION, SOLUTION INTRAVENOUS at 09:10

## 2024-10-23 RX ADMIN — METOCLOPRAMIDE HYDROCHLORIDE 10 MG: 5 SOLUTION ORAL at 06:10

## 2024-10-23 RX ADMIN — POTASSIUM BICARBONATE 20 MEQ: 391 TABLET, EFFERVESCENT ORAL at 11:10

## 2024-10-23 RX ADMIN — MUPIROCIN: 20 OINTMENT TOPICAL at 07:10

## 2024-10-23 RX ADMIN — RIVAROXABAN 20 MG: 10 TABLET, FILM COATED ORAL at 08:10

## 2024-10-23 RX ADMIN — SODIUM CHLORIDE 2 G: 9 INJECTION, SOLUTION INTRAVENOUS at 07:10

## 2024-10-23 RX ADMIN — LINEZOLID 600 MG: 600 INJECTION, SOLUTION INTRAVENOUS at 08:10

## 2024-10-23 RX ADMIN — MUPIROCIN: 20 OINTMENT TOPICAL at 08:10

## 2024-10-23 NOTE — PROGRESS NOTES
[facility] - Emergency Dept  Pulmonary Critical Care Note    Patient Name: Delio Daniel Jr.  MRN: [id number]  Admission Date: 10/20/2024  Hospital Length of Stay: 3 days  Code Status: Full Code  Attending Provider: Itz Francisco MD  Primary Care Provider: Jl Briones MD     Subjective:     HPI:   Patient is a 70 y/o male with extensive PMH who presented from NH on 10/20/24 with decreased responsiveness, severe sepsis, and recurrent UTI. 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     Patient transferred to ED from [facility], [place] with lethargy and tachycardia. Family at bedside reports patient is nonverbal at baseline but typically more alert. In the ED, patient is febrile, tachycardic with -190s, tachypneic, /60. EKG shows Afib with RVR. Diltiazem drip started in ED. Labs are significant for WBC of 16.5, lactic acid of 3.3, Cr 1.48, BUN 45.3, Na 155, K+ 5.1, troponin elevated at 0.118. UA with evidence of UTI. Of note, pt was being treated outpatient for a UTI, currently on day 4 of 7 day Rocephin course. Urine culture 10/16/24 with multidrug resistant Klebsiella pneumonia and Proteus mirabilis with susceptibility to Cefepime. Patient received 3L of NS and initiated on Vanc and Cefepime in ED. Admitted to the ICU on mechanical ventilation via trach.      Hospital Course/Significant events:  10/20/24: Admitted to ICU for severe sepsis     24 Hour Interval History:  No acute events overnight.  T-max 100° F. Blood culture positive for Enterococcus faecium and Klebsiella pneumoniae with sputum culture positive for Gram-negative rods.  Repeat blood cultures with no growth at 24 hours.   Echocardiogram obtained yesterday revealing no evidence of endocarditis.  Infectious Disease following, currently on daptomycin, cefepime, linezolid.        Past Medical History:   Diagnosis Date    Arthritis     Atrial fibrillation     BPH (benign prostatic hyperplasia)     Cardiac arrest     Coronary artery disease     Cyst, kidney, acquired     Diverticulosis     Hyperlipidemia     Hypertension     MI (myocardial infarction)     Obesity     Steatosis of liver     Stroke        Past Surgical History:   Procedure Laterality Date    A-V CARDIAC PACEMAKER INSERTION Right     CARDIAC CATHETERIZATION      COLONOSCOPY W/ BIOPSIES      CRANIECTOMY Right 12/20/2023    Procedure: CRANIECTOMY;  Surgeon: Artem Can MD;  Location: [facility] OR;  Service: Neurosurgery;  Laterality: Right;    ESOPHAGOGASTRODUODENOSCOPY W/ PEG N/A 1/2/2024    Procedure: PEG;  Surgeon: Tani Day MD;  Location: [facility] ENDOSCOPY;  Service: Gastroenterology;  Laterality: N/A;    excision of colon      TRACHEOSTOMY N/A 12/29/2023    Procedure: CREATION, TRACHEOSTOMY;  Surgeon: Patricia Winslow MD;  Location: [facility] OR;  Service: ENT;  Laterality: N/A;  REQ 1130 //  NEEDS 2 SCRUBS       Social History     Socioeconomic History    Marital status:     Number of children: 9   Occupational History    Occupation: retired   Tobacco Use    Smoking status: Never    Smokeless tobacco: Never   Substance and Sexual Activity    Alcohol use: Not Currently    Drug use: Not Currently    Sexual activity: Not Currently     Partners: Female     Social Drivers of Health     Financial Resource Strain: Patient Unable To Answer (10/21/2024)    Overall Financial Resource Strain (CARDIA)     Difficulty of Paying Living Expenses: Patient unable to answer   Food Insecurity: Patient Unable To Answer (10/21/2024)    Hunger Vital Sign     Worried About Running Out of Food in the Last Year: Patient unable to answer     Ran Out of Food in the Last Year: Patient  unable to answer   Transportation Needs: Patient Unable To Answer (10/21/2024)    TRANSPORTATION NEEDS     Transportation : Patient unable to answer   Physical Activity: Sufficiently Active (8/5/2024)    Exercise Vital Sign     Days of Exercise per Week: 5 days     Minutes of Exercise per Session: 30 min   Stress: Patient Unable To Answer (10/21/2024)    [employer] - Occupational Stress Questionnaire     Feeling of Stress : Patient unable to answer   Housing Stability: Patient Unable To Answer (10/21/2024)    Housing Stability Vital Sign     Unable to Pay for Housing in the Last Year: Patient unable to answer     Homeless in the Last Year: Patient unable to answer           Current Outpatient Medications   Medication Instructions    ascorbic Acid (VITAMIN C) 500 mg, 2 times daily, Per PEG tube    busPIRone (BUSPAR) 5 mg, Per G Tube, 3 times daily    cholestyramine (QUESTRAN) 4 gram packet 4 g, Daily, Via PEG tube    cholestyramine-aspartame (QUESTRAN LIGHT) 4 gram PwPk 4 g, Per G Tube, 2 times daily    diltiaZEM (CARDIZEM) 60 mg, Per G Tube, Every 6 hours    ferrous sulfate 300 mg, Oral, Daily    finasteride (PROSCAR) 5 mg, Oral, Daily    furosemide (LASIX) 80 mg, Per G Tube, As needed (PRN)    L. acidophilus/L.bulgaricus (FLORANEX ORAL) 1 packet, PEG Tube, Daily    levetiracetam 1,500 mg, Per G Tube, 2 times daily, Nursing home reports medication hold by MD until level redraw on Monday.    LIPITOR 10 mg, Per G Tube, Daily    metoclopramide HCl (REGLAN) 10 mg, Per G Tube, 3 times daily before meals    metoprolol tartrate (LOPRESSOR) 100 mg, Per G Tube, 2 times daily    miconazole NITRATE 2 % (MICOTIN) 2 % top powder Topical (Top), 2 times daily    multivitamin (THERAGRAN) per tablet 1 tablet, Per G Tube, Daily    pantoprazole (PROTONIX) 40 mg, Intravenous, 2 times daily    polyethylene glycol (GLYCOLAX) 17 g, Oral, 2 times daily PRN    protein supplement (PROMOD PROTEIN ORAL) 30 mLs,  Per G Tube, Daily    QUEtiapine (SEROQUEL) 25 mg, Per G Tube, 2 times daily    scopolamine (TRANSDERM-SCOP) 1.3-1.5 mg (1 mg over 3 days) 1 patch, Transdermal, Every 3 days    sucralfate (CARAFATE) 1 g, Per G Tube, Before meals & nightly    tamsulosin (FLOMAX) 0.4 mg, Oral, Nightly    venlafaxine 75 mg TR24 1 tablet, Per G Tube, 2 times daily    XARELTO 20 mg Tab 1 tablet, Per G Tube, Nightly       Current Inpatient Medications   atorvastatin  10 mg Per G Tube Daily    linezolid  600 mg Intravenous Q12H    meropenem IV (PEDS and ADULTS)  2 g Intravenous Q8H    metoclopramide HCl  10 mg Oral TID AC    metoprolol tartrate  25 mg Per G Tube TID    mupirocin   Nasal BID    pantoprazole  40 mg Intravenous Daily    QUEtiapine  25 mg Per G Tube BID    rivaroxaban  20 mg Per G Tube Nightly    scopolamine  1 patch Transdermal Q3 Days       Current Intravenous Infusions          Review of Systems   Unable to perform ROS: Mental status change          Objective:       Intake/Output Summary (Last 24 hours) at 10/23/2024 1015  Last data filed at 10/23/2024 0800  Gross per 24 hour   Intake 846.98 ml   Output 605 ml   Net 241.98 ml         Vital Signs (Most Recent):  Temp: 98.7 °F (37.1 °C) (10/23/24 0800)  Pulse: 88 (10/23/24 0900)  Resp: 13 (10/23/24 0900)  BP: 99/65 (10/23/24 0900)  SpO2: 100 % (10/23/24 0900)  Body mass index is 22.49 kg/m².  Weight: 69.1 kg (152 lb 5.4 oz) Vital Signs (24h Range):  Temp:  [97.8 °F (36.6 °C)-100 °F (37.8 °C)] 98.7 °F (37.1 °C)  Pulse:  [] 88  Resp:  [13-30] 13  SpO2:  [95 %-100 %] 100 %  BP: ()/(59-79) 99/65     Physical Exam  Constitutional:       General: He is not in acute distress.     Appearance: He is ill-appearing.      Comments: Thin, Chronically ill-appearing   HENT:      Mouth/Throat:      Mouth: Mucous membranes are dry.   Cardiovascular:      Rate and Rhythm: Tachycardia present. Rhythm irregular.   Abdominal:      General: There is no distension.      Palpations:  Abdomen is soft.      Comments: PEG tube in place    Musculoskeletal:      Comments: LUE and LLE in contracture    Skin:     General: Skin is warm and dry.      Comments: Large sacral pressure ulcer. BLE with scattered superficial abrasions.    Neurological:      Comments: Non-sedated. Nonverbal at baseline. No spontaneous eye opening. Corneal reflex intact. Does not follow commands. Winces and withdraws to painful stimuli in RUE and RLE.           Lines/Drains/Airways       Drain  Duration                  Gastrostomy/Enterostomy 01/02/24 1230  days         Urethral Catheter 10/20/24 2210 Silicone 16 Fr. 2 days              Airway  Duration             Adult Surgical Airway 08/19/24 0120 Shiley Extra Large Cuffed Distal 6.0/ 75mm 65 days              Peripheral Intravenous Line  Duration                  Midline Catheter - Single Lumen 10/20/24 1530 Right 2 days         Peripheral IV - Single Lumen 10/20/24 1600 18 G Anterior;Distal;Left Upper Arm 2 days                    Significant Labs:    Lab Results   Component Value Date    WBC 9.83 10/23/2024    HGB 8.2 (L) 10/23/2024    HCT 29.0 (L) 10/23/2024    MCV 90.9 10/23/2024     10/23/2024           BMP  Lab Results   Component Value Date     (H) 10/23/2024    K 3.3 (L) 10/23/2024    CO2 18 (L) 10/23/2024    BUN 23.2 10/23/2024    CREATININE 0.82 10/23/2024    CALCIUM 8.1 (L) 10/23/2024    AGAP 10.0 10/23/2024    EGFRNONAA 61 04/23/2022         ABG  Recent Labs   Lab 10/21/24  0625   PH 7.460*   PO2 63.0*   PCO2 35.0   HCO3 24.9   POCBASEDEF 1.30       Mechanical Ventilation Support:  Vent Mode: A/C (10/23/24 0814)  Ventilator Initiated: Yes (10/20/24 1546)  Set Rate: 20 BPM (10/23/24 0814)  Vt Set: 500 mL (10/23/24 0814)  PEEP/CPAP: 5 cmH20 (10/23/24 0814)  Oxygen Concentration (%): 40 (10/23/24 0900)  Peak Airway Pressure: 15 cmH20 (10/23/24 0814)  Total Ve: 7.9 L/m (10/23/24 0814)  F/VT Ratio<105 (RSBI): (!) 53.19 (10/22/24  1000)    Significant Imaging:  I have reviewed the pertinent imaging within the past 24 hours.    CT Abdomen Pelvis With IV Contrast NO Oral Contrast  Result Date: 10/21/2024  Interval development of sacral decubitus just to the right of the superior aspect of the vertebral fold.  No abscess or fluid collection is seen Findings seen consistent with urinary bladder wall thickening and inflammatory changes consistent with cystitis Interval development of bilateral patchy areas of pneumonia with developing consolidation in the lower lobes Electronically signed by: Erick Grant Date:    10/21/2024 Time:    17:22      Assessment/Plan:     Assessment  Sepsis  VRE bacteremia   Klebsiella bacteremia  UTI  Afib with RVR  Hypernatremia   Sacral wound      Plan  Admitted to ICU   On mechanical ventilation via trach   CIS following, patient received digoxin on 10/21/2024., started lopressor 25 mg TID  Echocardiogram with no evidence of endocarditis.  Repeat blood cultures every 48 hours until negative.  Blood cultures on 10/22/2024 no growth at 12:00 p.m..  Urine culture with no growth to date, Resp culture with Gram-negative rods  ID following, continue cefepime, daptomycin, and linezolid, pending final speciation and sensitivities  Wound care consulted for pressure ulcer of the sacrum     DVT Prophylaxis: SCDs, continue home Xarelto   GI Prophylaxis: PPI     32 minutes of critical care was time spent personally by me on the following activities: development of treatment plan with patient or surrogate and bedside caregivers, discussions with consultants, evaluation of patient's response to treatment, examination of patient, ordering and performing treatments and interventions, ordering and review of laboratory studies, ordering and review of radiographic studies, pulse oximetry, re-evaluation of patient's condition.  This critical care time did not overlap with that of any other provider or involve time for any procedures.      Mark Mckee MD  Pulmonary Critical Care Medicine  [facility] - Emergency Dept  DOS: 10/23/2024

## 2024-10-23 NOTE — PLAN OF CARE
Problem: Skin Injury Risk Increased  Goal: Skin Health and Integrity  10/23/2024 0520 by Spencer Weldon RN  Outcome: Progressing  10/23/2024 0519 by Spencer Weldon RN  Outcome: Progressing     Problem: Adult Inpatient Plan of Care  Goal: Plan of Care Review  10/23/2024 0520 by Spencer Weldon RN  Outcome: Progressing  10/23/2024 0519 by Spencer Weldon RN  Outcome: Progressing  Goal: Patient-Specific Goal (Individualized)  10/23/2024 0520 by Spencer Weldon RN  Outcome: Progressing  10/23/2024 0519 by Spencer Weldon RN  Outcome: Progressing  Goal: Absence of Hospital-Acquired Illness or Injury  10/23/2024 0520 by Spencer Weldon RN  Outcome: Progressing  10/23/2024 0519 by Spencer Weldon RN  Outcome: Progressing  Goal: Optimal Comfort and Wellbeing  10/23/2024 0520 by Spencer Weldon RN  Outcome: Progressing  10/23/2024 0519 by Spencer Weldon RN  Outcome: Progressing  Goal: Readiness for Transition of Care  10/23/2024 0520 by Spencer Weldon RN  Outcome: Progressing  10/23/2024 0519 by Spencer Weldon, RN  Outcome: Progressing     Problem: Sepsis/Septic Shock  Goal: Optimal Coping  10/23/2024 0520 by Spencer Weldon RN  Outcome: Progressing  10/23/2024 0519 by Spencer Weldon, RN  Outcome: Progressing  Goal: Absence of Bleeding  10/23/2024 0520 by Spencer Weldon, RN  Outcome: Progressing  10/23/2024 0519 by Spencer Weldon, RN  Outcome: Progressing  Goal: Blood Glucose Level Within Targeted Range  10/23/2024 0520 by Spencer Weldon, RN  Outcome: Progressing  10/23/2024 0519 by Spencer Weldon, RN  Outcome: Progressing  Goal: Absence of Infection Signs and Symptoms  10/23/2024 0520 by Spencer Weldon, RN  Outcome: Progressing  10/23/2024 0519 by Spencer Weldon, RN  Outcome: Progressing  Goal: Optimal Nutrition Intake  10/23/2024 0520 by Spencer Weldon, RN  Outcome: Progressing  10/23/2024 0519 by Spencer Weldon, RN  Outcome: Progressing     Problem:  Infection  Goal: Absence of Infection Signs and Symptoms  10/23/2024 0520 by Spencer Weldon RN  Outcome: Progressing  10/23/2024 0519 by Spencer Weldon RN  Outcome: Progressing     Problem: Pneumonia  Goal: Fluid Balance  10/23/2024 0520 by Spencer Weldon RN  Outcome: Progressing  10/23/2024 0519 by Spencer Weldon RN  Outcome: Progressing  Goal: Resolution of Infection Signs and Symptoms  10/23/2024 0520 by Spencer Weldon RN  Outcome: Progressing  10/23/2024 0519 by Spencer Weldon RN  Outcome: Progressing  Goal: Effective Oxygenation and Ventilation  10/23/2024 0520 by Spencer Weldon RN  Outcome: Progressing  10/23/2024 0519 by Spencer Weldon RN  Outcome: Progressing     Problem: Wound  Goal: Optimal Coping  10/23/2024 0520 by Spencer Weldon RN  Outcome: Progressing  10/23/2024 0519 by Spencer Weldon RN  Outcome: Progressing  Goal: Optimal Functional Ability  10/23/2024 0520 by Spencer Weldon RN  Outcome: Progressing  10/23/2024 0519 by Spencer Weldon RN  Outcome: Progressing  Goal: Absence of Infection Signs and Symptoms  10/23/2024 0520 by Spencer Weldon RN  Outcome: Progressing  10/23/2024 0519 by Spencer Weldon RN  Outcome: Progressing  Goal: Improved Oral Intake  10/23/2024 0520 by Spencer Weldon, RN  Outcome: Progressing  10/23/2024 0519 by Spencer Weldon, RN  Outcome: Progressing  Goal: Optimal Pain Control and Function  10/23/2024 0520 by Spencer Weldon, RN  Outcome: Progressing  10/23/2024 0519 by Spencer Weldon, RN  Outcome: Progressing  Goal: Skin Health and Integrity  10/23/2024 0520 by Spencer Weldon, RN  Outcome: Progressing  10/23/2024 0519 by Spencer Weldon RN  Outcome: Progressing  Goal: Optimal Wound Healing  10/23/2024 0520 by Spencer Weldon, RN  Outcome: Progressing  10/23/2024 0519 by Spencer Weldon RN  Outcome: Progressing     Problem: Fall Injury Risk  Goal: Absence of Fall and Fall-Related Injury  10/23/2024  0520 by Spencer Weldon, RN  Outcome: Progressing  10/23/2024 0519 by Spencer Weldon, RN  Outcome: Progressing

## 2024-10-23 NOTE — PROGRESS NOTES
Infectious Disease  Progress Note    Patient Name: Delio Daniel Jr.   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 On admission, noted to be hypotensive with Afib and RVR, recently had positive urine culture with Klebsiella and Proteus. He also has an advanced sacral ulcer. He was noted to have bacteremia with 2/4 of admission blood cultures being positive for Enterococcus faecium and ultimately noting it is VRE per BCID. ID consulted for assistance in management.      Bacteremia  VRE bacteremia  Klebsiella bacteremia  CVA with residual cognitive deficits, motor deficits  Tracheostomy and PEG tube dependent  Sacral ulcer   Recurrent UTI  Pacemaker In place  History of V fib arrest     -somewhat more awake today, however continues not to follow command, he does open his eyes to voice.    -noted Klebsiella pneumoniae and Proteus mirabilis in the sputum   -bacteremia noted for VRE with daptomycin dose dependent susceptibility, but also ESBL producing Klebsiella pneumoniae    Recommendations:  -for now, will discontinue daptomycin to avoid polypharmacy, will continue with linezolid however, repeat blood cultures appear to be without growth  -Klebsiella in the blood is susceptible to cefepime however given patient's severe illness and bacteremia with an ESBL producing organism, would  prefer to use meropenem  -discontinue cefepime, start meropenem 1 g IV q.8 hours  -Continue Linezolid 600mg q12h, will transitioned to IV formulation given unsure of how well his gut is working at this time in his critical illness, will likely be able to transitioned to PEG tube formulation as the patient stabilizes versus possibly restarting higher dose daptomycin instead  -general surgery consulted  -Presence of the ICD is concerning in the setting of this bacteremia  -TTE, no evidence of vegetations on the ICD, patient may however possibly need MENDEL given concerning nature of the bacteremia  -Repeat blood cultures remain without growth at 12:00 p.m.  -patient is quite ill with multiple comorbidities, has been declining and worsening over the past year, appropriate to engage goals of care conversation with the patient's family and consider palliative care evaluation to help determine those goals of care     Thank you for your consult. ID will continue following. Please contact us with any questions or concerns.     Antibiotics History:  Cefepime 10/20 - Present  Vancomycin 10/20  Daptomycin 10/21 - Present  Linezolid 10/21 - Present        Portions of this note dictated using EMR integrated voice recognition software, and may be subject to voice recognition errors not corrected at proofreading. Please contact writer for clarification if needed.    Subjective:     Principal Problem: UTI (urinary tract infection)     Interval History:   Opens eyes to voice but otherwise does not follow command, overall unchanged    Review of Systems   Review of Systems   Unable to perform ROS: Medical condition        Objective:     Vital Signs (Most Recent):  Temp: 99 °F (37.2 °C) (10/23/24 1200)  Pulse: 86 (10/23/24 1200)  Resp: 20 (10/23/24 1335)  BP: 94/67 (10/23/24 1200)  SpO2: 100 % (10/23/24 1200)  Vital Signs (24h Range):  Temp:  [97.9 °F (36.6 °C)-100 °F (37.8 °C)] 99 °F (37.2 °C)  Pulse:  [] 86  Resp:  [2-30]  20  SpO2:  [95 %-100 %] 100 %  BP: ()/(59-79) 94/67      Weight:   Wt Readings from Last 1 Encounters:   10/21/24 69.1 kg (152 lb 5.4 oz)      Body mass index is Body mass index is 22.49 kg/m².     Estimated Creatinine Clearance: Estimated Creatinine Clearance: 84.3 mL/min (based on SCr of 0.82 mg/dL).     Lines/Drains/Airways       Drain  Duration                  Gastrostomy/Enterostomy 01/02/24 1230  days         Urethral Catheter 10/20/24 2210 Silicone 16 Fr. 2 days              Airway  Duration             Adult Surgical Airway 08/19/24 0120 Shiley Extra Large Cuffed Distal 6.0/ 75mm 65 days              Peripheral Intravenous Line  Duration                  Midline Catheter - Single Lumen 10/20/24 1530 Right 2 days         Peripheral IV - Single Lumen 10/20/24 1600 18 G Anterior;Distal;Left Upper Arm 2 days                     Physical Exam  Physical Exam  Constitutional:       Appearance: He is ill-appearing (Chronically ill-appearing).   HENT:      Head: Normocephalic and atraumatic.      Mouth/Throat:      Pharynx: No oropharyngeal exudate or posterior oropharyngeal erythema.   Eyes:      Extraocular Movements: Extraocular movements intact.      Pupils: Pupils are equal, round, and reactive to light.   Cardiovascular:      Rate and Rhythm: Normal rate and regular rhythm.      Heart sounds: No murmur heard.  Pulmonary:      Effort: No respiratory distress.      Breath sounds: No wheezing, rhonchi or rales.      Comments: Tracheostomy in place  Abdominal:      General: Bowel sounds are normal. There is no distension.      Palpations: Abdomen is soft.      Tenderness: There is no abdominal tenderness.      Comments: Peg tube in place   Musculoskeletal:         General: No swelling or tenderness.      Cervical back: Neck supple. No rigidity or tenderness.      Comments: Deep sacral ulcer, evaluated with the wound care physician today, slough, tunneling, foul-smelling, contaminated with feces    Lymphadenopathy:      Cervical: No cervical adenopathy.   Skin:     Findings: No lesion or rash.   Neurological:      Mental Status: He is alert.      Comments: At baseline, nonverbal, poorly participating with evaluation, left-sided contractures, not following command          Significant Labs: CBC:   Recent Labs   Lab 10/22/24  0409 10/23/24  0337   WBC 12.61* 9.83   HGB 8.7* 8.2*   HCT 30.5* 29.0*    175     CMP:   Recent Labs   Lab 10/22/24  0409 10/23/24  0337   * 152*   K 3.7 3.3*   * 124*   CO2 21* 18*   BUN 28.1* 23.2   CREATININE 0.82 0.82   CALCIUM 8.5* 8.1*   ALBUMIN 1.9* 1.8*   BILITOT 0.5 0.7   ALKPHOS 94 94   AST 12 13   ALT 10 8       Microbiology Results (last 7 days)       Procedure Component Value Units Date/Time    Respiratory Culture [[id number]]  (Abnormal) Collected: 10/20/24 2234    Order Status: Completed Specimen: Sputum Updated: 10/23/24 1345     Respiratory Culture Many Klebsiella pneumoniae     Comment: with normal respiratory niyah         Moderate Proteus mirabilis     GRAM STAIN Quality 3+      Moderate Gram Negative Rods      Many Gram Positive Rods      Rare Yeast    Blood Culture [[id number]]  (Normal) Collected: 10/22/24 0758    Order Status: Completed Specimen: Blood from Hand, Left Updated: 10/23/24 0901     Blood Culture No Growth At 24 Hours    Blood Culture [[id number]]  (Normal) Collected: 10/22/24 0758    Order Status: Completed Specimen: Blood from Hand, Right Updated: 10/23/24 0901     Blood Culture No Growth At 24 Hours    Blood culture #2 **CANNOT BE ORDERED STAT** [[id number]]  (Abnormal)  (Susceptibility) Collected: 10/20/24 1603    Order Status: Completed Specimen: Blood Updated: 10/23/24 0702     Blood Culture Enterococcus faecium VRE      Klebsiella pneumoniae esbl     GRAM STAIN Gram Positive Cocci, probable Streptococcus      Seen in gram stain of broth only      2 of 2 bottles positive    BCID2 Panel [[id number]]  (Abnormal) Collected:  10/20/24 1603    Order Status: Completed Specimen: Blood Updated: 10/23/24 0636     CTX-M (ESBL ) N/A     IMP (Cabapenemase ) N/A     KPC resistance gene (Carbapenemase ) N/A     mcr-1 N/A     mecA ID N/A     Comment: Note: Antimicrobial resistance can occur via multiple mechanisms. A Not Detected result for antimicrobial resistance gene(s) does not indicate antimicrobial susceptibility. Subculturing is required for species identification and susceptibility testing of   isolates.        mecA/C and MREJ (MRSA) gene N/A     NDM (Carbapenemase ) N/A     OXA-48-like (Carbapenemase ) N/A     Sofi/B (VRE gene) Detected     VIM (Carbapenemase ) N/A     Enterococcus faecalis Not Detected     Enterococcus faecium Detected     Listeria monocytogenes Not Detected     Staphylococcus spp. Not Detected     Staphylococcus aureus Not Detected     Staphylococcus epidermidis Not Detected     Staphylococcus lugdunensis Not Detected     Streptococcus spp. Not Detected     Streptococcus agalactiae (Group B) Not Detected     Streptococcus pneumoniae Not Detected     Streptococcus pyogenes (Group A) Not Detected     Acinetobacter calcoaceticus/baumannii complex Not Detected     Bacteroides fragilis Not Detected     Enterobacterales Not Detected     Enterobacter cloacae complex Not Detected     Escherichia coli Not Detected     Klebsiella aerogenes Not Detected     Klebsiella oxytoca Not Detected     Klebsiella pneumoniae group Detected     Comment: This is a corrected result. Previous result was Not Detected on 10/21/2024 at 1146 CDT.        Proteus spp. Not Detected     Salmonella spp. Not Detected     Serratia marcescens Not Detected     Haemophilus influenzae Not Detected     Neisseria meningitidis Not Detected     Pseudomonas aeruginosa Not Detected     Stenotrophomonas maltophilia Not Detected     Candida albicans Not Detected     Candida auris Not Detected     Candida glabrata Not  Detected     Candida krusei Not Detected     Candida parapsilosis Not Detected     Candida tropicalis Not Detected     Cryptococcus neoformans/gattii Not Detected    Narrative:      The [employer] BCID2 Panel is a multiplexed nucleic acid test intended for the use with InterpretOmics® 2.0 or InterpretOmics® SRE [place] - 2 Systems for the simultaneous qualitative detection and identification of multiple bacterial and yeast nucleic acids and select genetic determinants associated with antimicrobial resistance.  The [employer] BCID2 Panel test is performed directly on blood culture samples identified as positive by a continuous monitoring blood culture system.  Results are intended to be interpreted in conjunction with Gram stain results.    Blood culture #1 **CANNOT BE ORDERED STAT** [[id number]]  (Normal) Collected: 10/20/24 1603    Order Status: Completed Specimen: Blood Updated: 10/22/24 1701     Blood Culture No Growth At 48 Hours    Urine culture [[id number]] Collected: 10/20/24 1654    Order Status: Completed Specimen: Urine Updated: 10/22/24 0725     Urine Culture No Growth             Significant Imaging: I have reviewed all pertinent imaging results/findings within the past 24 hours.      Kenneth Bhardwaj MD  Infectious Disease  [facility]

## 2024-10-23 NOTE — CONSULTS
Acute Care Surgery   Consult Note    Patient Name: Delio Daniel Jr.  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 Currently on broad spectrum antibiotics with ID following. Cardiology following persistent afib, recently taken off amiodarone gtt, continues on Digoxin and metoprolol. Palliative consult recently placed to discuss goals of care. Surgery consulted for sacral decub ulcer and consideration for diverting ostomy, currently worsening due to mobility limitations. Afebrile without leukocytosis for the last 24 hours on IV abx, with hypernatremia to 152, hypokalemia 3.3. Normal lactic 1.8.     Review of Systems:  12 point ROS negative except as stated in HPI    PAST HISTORY:   Past medical history:  Past Medical History:   Diagnosis Date    Arthritis     Atrial fibrillation     BPH (benign prostatic hyperplasia)     Cardiac arrest     Coronary artery disease     Cyst, kidney, acquired     Diverticulosis     Hyperlipidemia     Hypertension     MI (myocardial infarction)     Obesity     Steatosis of liver     Stroke        Past surgical history:  Past Surgical History:   Procedure Laterality Date    A-V CARDIAC PACEMAKER INSERTION Right     CARDIAC CATHETERIZATION      COLONOSCOPY W/ BIOPSIES      CRANIECTOMY Right 12/20/2023    Procedure:  CRANIECTOMY;  Surgeon: Artem Can MD;  Location: [facility] OR;  Service: Neurosurgery;  Laterality: Right;    ESOPHAGOGASTRODUODENOSCOPY W/ PEG N/A 1/2/2024    Procedure: PEG;  Surgeon: Tani Day MD;  Location: [facility] ENDOSCOPY;  Service: Gastroenterology;  Laterality: N/A;    excision of colon      TRACHEOSTOMY N/A 12/29/2023    Procedure: CREATION, TRACHEOSTOMY;  Surgeon: Patricia Winslow MD;  Location: [facility] OR;  Service: ENT;  Laterality: N/A;  REQ 1130 //  NEEDS 2 SCRUBS       Family history:  Family History   Problem Relation Name Age of Onset    Hypertension Mother      Hypertension Father      Hypertension Sister         Social history:  Social History     Socioeconomic History    Marital status:     Number of children: 9   Occupational History    Occupation: retired   Tobacco Use    Smoking status: Never    Smokeless tobacco: Never   Substance and Sexual Activity    Alcohol use: Not Currently    Drug use: Not Currently    Sexual activity: Not Currently     Partners: Female     Social Drivers of Health     Financial Resource Strain: Patient Unable To Answer (10/21/2024)    Overall Financial Resource Strain (CARDIA)     Difficulty of Paying Living Expenses: Patient unable to answer   Food Insecurity: Patient Unable To Answer (10/21/2024)    Hunger Vital Sign     Worried About Running Out of Food in the Last Year: Patient unable to answer     Ran Out of Food in the Last Year: Patient unable to answer   Transportation Needs: Patient Unable To Answer (10/21/2024)    TRANSPORTATION NEEDS     Transportation : Patient unable to answer   Physical Activity: Sufficiently Active (8/5/2024)    Exercise Vital Sign     Days of Exercise per Week: 5 days     Minutes of Exercise per Session: 30 min   Stress: Patient Unable To Answer (10/21/2024)    [employer] - Occupational Stress Questionnaire     Feeling of Stress : Patient unable to answer   Housing Stability: Patient Unable  To Answer (10/21/2024)    Housing Stability Vital Sign     Unable to Pay for Housing in the Last Year: Patient unable to answer     Homeless in the Last Year: Patient unable to answer     Social History     Tobacco Use   Smoking Status Never   Smokeless Tobacco Never      Social History     Substance and Sexual Activity   Alcohol Use Not Currently        MEDICATIONS & ALLERGIES:     No current facility-administered medications on file prior to encounter.     Current Outpatient Medications on File Prior to Encounter   Medication Sig    ascorbic Acid (VITAMIN C) 500 mg CpSR 500 mg 2 (two) times daily. Per PEG tube    busPIRone (BUSPAR) 5 MG Tab 5 mg by Per G Tube route 3 (three) times daily.    cholestyramine (QUESTRAN) 4 gram packet 4 g once daily. Via PEG tube    cholestyramine-aspartame (QUESTRAN LIGHT) 4 gram PwPk 1 packet (4 g total) by Per G Tube route 2 (two) times daily.    diltiaZEM (CARDIZEM) 60 MG tablet 60 mg by Per G Tube route every 6 (six) hours.    ferrous sulfate 300 mg (60 mg iron)/5 mL syrup Take 5 mLs (300 mg total) by mouth once daily.    finasteride (PROSCAR) 5 mg tablet Take 1 tablet (5 mg total) by mouth once daily.    furosemide (LASIX) 80 MG tablet 80 mg by Per G Tube route as needed (for Edema).    L. acidophilus/L.bulgaricus (FLORANEX ORAL) 1 packet by PEG Tube route Daily.    levetiracetam 500 mg/5 mL (5 mL) Soln 1,500 mg by Per G Tube route 2 (two) times daily. Nursing home reports medication hold by MD until level redraw on Monday.    LIPITOR 10 mg tablet 10 mg by Per G Tube route once daily.    metoclopramide HCl (REGLAN) 5 mg/5 mL Soln 10 mg by Per G Tube route 3 (three) times daily before meals.    metoprolol tartrate (LOPRESSOR) 100 MG tablet 100 mg by Per G Tube route 2 (two) times daily.    miconazole NITRATE 2 % (MICOTIN) 2 % top powder Apply topically 2 (two) times daily.    multivitamin (THERAGRAN) per tablet 1 tablet by Per G Tube route once daily.    pantoprazole (PROTONIX) 40  mg injection Inject 40 mg into the vein 2 (two) times daily.    polyethylene glycol (GLYCOLAX) 17 gram PwPk Take 17 g by mouth 2 (two) times daily as needed for Constipation.    protein supplement (PROMOD PROTEIN ORAL) 30 mLs by Per G Tube route once daily.    QUEtiapine (SEROQUEL) 25 MG Tab 25 mg by Per G Tube route 2 (two) times daily.    scopolamine (TRANSDERM-SCOP) 1.3-1.5 mg (1 mg over 3 days) Place 1 patch onto the skin Every 3 (three) days.    sucralfate (CARAFATE) 1 gram tablet 1 tablet (1 g total) by Per G Tube route 4 (four) times daily before meals and nightly.    tamsulosin (FLOMAX) 0.4 mg Cap Take 1 capsule (0.4 mg total) by mouth every evening.    venlafaxine 75 mg TR24 1 tablet by Per G Tube route 2 (two) times a day.    XARELTO 20 mg Tab 1 tablet by Per G Tube route nightly.       Allergies: Review of patient's allergies indicates:  No Known Allergies    Scheduled Meds:   atorvastatin  10 mg Per G Tube Daily    linezolid  600 mg Intravenous Q12H    meropenem IV (PEDS and ADULTS)  2 g Intravenous Q8H    metoclopramide HCl  10 mg Oral TID AC    metoprolol tartrate  25 mg Per G Tube TID    mupirocin   Nasal BID    pantoprazole  40 mg Intravenous Daily    QUEtiapine  25 mg Per G Tube BID    rivaroxaban  20 mg Per G Tube Nightly    scopolamine  1 patch Transdermal Q3 Days       Continuous Infusions:    PRN Meds:  Current Facility-Administered Medications:     acetaminophen, 650 mg, Per G Tube, Q6H PRN    dextromethorphan-guaiFENesin  mg/5 ml, 5 mL, Per G Tube, Q4H PRN    dextrose 10%, 12.5 g, Intravenous, PRN    dextrose 10%, 25 g, Intravenous, PRN    glucagon (human recombinant), 1 mg, Intramuscular, PRN    hydrALAZINE, 10 mg, Intravenous, Q4H PRN    metoprolol, 5 mg, Intravenous, Q5 Min PRN    naloxone, 0.02 mg, Intravenous, PRN    sodium chloride 0.9%, 10 mL, Intravenous, Q12H PRN    OBJECTIVE:   Vital Signs:  VITAL SIGNS: 24 HR MIN & MAX LAST   Temp  Min: 98.7 °F (37.1 °C)  Max: 100 °F (37.8 °C)  " 99 °F (37.2 °C)   BP  Min: 90/66  Max: 112/79  97/69    Pulse  Min: 79  Max: 110  91    Resp  Min: 13  Max: 30  (!) 23    SpO2  Min: 95 %  Max: 100 %  100 %      HT: 5' 9.02" (175.3 cm)  WT: 69.1 kg (152 lb 5.4 oz)  BMI: 22.5     Intake/output:  Intake/Output - Last 3 Shifts         10/21 0700  10/22 0659 10/22 0700  10/23 0659 10/23 0700  10/24 0659    I.V. (mL/kg) 1558.7 (22.6) 86.5 (1.3)     NG/ 711     IV Piggyback 1684.8 49.5     Total Intake(mL/kg) 3428.5 (49.6) 847 (12.3)     Urine (mL/kg/hr) 1180 (0.7) 600 (0.4) 270 (0.4)    Drains 0      Stool 0 0 0    Total Output 1180 600 270    Net +2248.5 +247 -270           Stool Occurrence 1 x 1 x 0 x            Intake/Output Summary (Last 24 hours) at 10/23/2024 1606  Last data filed at 10/23/2024 1400  Gross per 24 hour   Intake 846.98 ml   Output 695 ml   Net 151.98 ml         Physical Exam:  General: ill appearing no acute distress  HEENT: Normocephalic  CV: RR  Resp: Trached on mechanical ventilation   GI:  Abdomen soft, non-tender, non-distended, PEG in place  :  Deferred  MSK: contraction to bilateral lower extremities, minimal movement of RUE  Skin/wounds: decubitus ulcer with minimal necrotic tissue, no obviously exposed bone or purulent drainage, packing in place, some stool contamination   Neuro:  opens eyes spontaneously     Labs:  Troponin:  Recent Labs     10/20/24  1943   TROPONINI 0.094*     CBC:  Recent Labs     10/21/24  0147 10/22/24  0409 10/23/24  0337   WBC 12.69  12.69* 12.61* 9.83   RBC 3.30* 3.36* 3.19*   HGB 8.6* 8.7* 8.2*   HCT 28.2* 30.5* 29.0*    178 175   MCV 85.5 90.8 90.9   MCH 26.1* 25.9* 25.7*   MCHC 30.5* 28.5* 28.3*     CMP:  Recent Labs     10/21/24  0147 10/22/24  0409 10/23/24  0337   CALCIUM 8.2* 8.5* 8.1*   ALBUMIN 2.1* 1.9* 1.8*   * 153* 152*   K 3.7 3.7 3.3*   CO2 20* 21* 18*   * 124* 124*   BUN 31.7* 28.1* 23.2   CREATININE 1.01 0.82 0.82   ALKPHOS 90 94 94   ALT 14 10 8   AST 13 12 13   BILITOT " "0.6 0.5 0.7     Lactic Acid:  Recent Labs     10/20/24  1845 10/21/24  0147   LACTATE 2.1 1.8     ETOH:  No results for input(s): "ETHANOL" in the last 72 hours.   Urine Drug Screen:  No results for input(s): "COCAINE", "OPIATE", "BARBITURATE", "AMPHETAMINE", "FENTANYL", "CANNABINOIDS", "MDMA" in the last 72 hours.    Invalid input(s): "BENZODIAZEPINE", "PHENCYCLIDINE"   ABG:  Recent Labs   Lab 10/21/24  0625   PH 7.460*   PO2 63.0*   PCO2 35.0   HCO3 24.9        Diagnostic Results:  CT Abdomen Pelvis With IV Contrast NO Oral Contrast   Final Result      Interval development of sacral decubitus just to the right of the superior aspect of the vertebral fold.  No abscess or fluid collection is seen      Findings seen consistent with urinary bladder wall thickening and inflammatory changes consistent with cystitis      Interval development of bilateral patchy areas of pneumonia with developing consolidation in the lower lobes         Electronically signed by: Erick Grant   Date:    10/21/2024   Time:    17:22      X-Ray Chest AP Portable   Final Result      No acute cardiopulmonary process identified.         Electronically signed by: Sami Taylor   Date:    10/20/2024   Time:    19:36          ASSESSMENT & PLAN:    Delio Daniel Jr. is a 69 y.o. 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 Surgery consulted for sacral decub ulcer and consideration for diverting ostomy.    No acute surgical intervention for debridement or diverting ostomy at this time  Will defer surgical " intervention at this time until goals of care discussion with palliative care    Surgical debridement of the sacral decub would be considered if medically optimized and desired by patient/family after further decision of patient's high risk for poor wound healing, sacral wound unlikely to be causing sepsis with more likely sources of bacteremia, UTI and PNA  Will also defer diverting ostomy in the setting of decompensation with acute infections due to high risk for poor wound healing, similarly prior abdominal surgeries for NET tumor making an elective procedure a much higher risk for failure/complications along with limited benefit to sacral wound healing    Recommend nutritional optimization for best wound healing outcomes  Recommend turn patient every 2 hours to offload pressure areas  Recommend continued wound care with enzymatic debridement and wound packing  Antibiotics and rest of care per primary ICU team and consultants   Please call with any additional questions or concerns.       Robert Miles MD MPH  [facility]

## 2024-10-23 NOTE — PLAN OF CARE
Problem: Skin Injury Risk Increased  Goal: Skin Health and Integrity  Outcome: Progressing     Problem: Adult Inpatient Plan of Care  Goal: Plan of Care Review  Outcome: Progressing  Goal: Patient-Specific Goal (Individualized)  Outcome: Progressing  Goal: Absence of Hospital-Acquired Illness or Injury  Outcome: Progressing  Goal: Optimal Comfort and Wellbeing  Outcome: Progressing  Goal: Readiness for Transition of Care  Outcome: Progressing     Problem: Infection  Goal: Absence of Infection Signs and Symptoms  Outcome: Progressing     Problem: Sepsis/Septic Shock  Goal: Optimal Coping  Outcome: Progressing  Goal: Absence of Bleeding  Outcome: Progressing  Goal: Blood Glucose Level Within Targeted Range  Outcome: Progressing  Goal: Absence of Infection Signs and Symptoms  Outcome: Progressing  Goal: Optimal Nutrition Intake  Outcome: Progressing     Problem: Pneumonia  Goal: Fluid Balance  Outcome: Progressing  Goal: Resolution of Infection Signs and Symptoms  Outcome: Progressing  Goal: Effective Oxygenation and Ventilation  Outcome: Progressing     Problem: Wound  Goal: Optimal Coping  Outcome: Progressing  Goal: Optimal Functional Ability  Outcome: Progressing  Goal: Absence of Infection Signs and Symptoms  Outcome: Progressing  Goal: Improved Oral Intake  Outcome: Progressing  Goal: Optimal Pain Control and Function  Outcome: Progressing  Goal: Skin Health and Integrity  Outcome: Progressing  Goal: Optimal Wound Healing  Outcome: Progressing     Problem: Fall Injury Risk  Goal: Absence of Fall and Fall-Related Injury  Outcome: Progressing

## 2024-10-23 NOTE — PLAN OF CARE
Clinical updates sent to to [facility]. Made facility aware there this patient will be ready for discharge soon, possibly today.

## 2024-10-24 LAB
ALBUMIN SERPL-MCNC: 1.8 G/DL (ref 3.4–4.8)
ALBUMIN/GLOB SERPL: 0.5 RATIO (ref 1.1–2)
ALP SERPL-CCNC: 94 UNIT/L (ref 40–150)
ALT SERPL-CCNC: 8 UNIT/L (ref 0–55)
ANION GAP SERPL CALC-SCNC: 11 MEQ/L
AST SERPL-CCNC: 12 UNIT/L (ref 5–34)
BACTERIA SPEC CULT: ABNORMAL
BACTERIA SPEC CULT: ABNORMAL
BASOPHILS # BLD AUTO: 0.06 X10(3)/MCL
BASOPHILS NFR BLD AUTO: 0.5 %
BILIRUB SERPL-MCNC: 0.7 MG/DL
BUN SERPL-MCNC: 16.7 MG/DL (ref 8.4–25.7)
CALCIUM SERPL-MCNC: 7.2 MG/DL (ref 8.8–10)
CHLORIDE SERPL-SCNC: 123 MMOL/L (ref 98–107)
CO2 SERPL-SCNC: 19 MMOL/L (ref 23–31)
CREAT SERPL-MCNC: 0.71 MG/DL (ref 0.72–1.25)
CREAT/UREA NIT SERPL: 24
EOSINOPHIL # BLD AUTO: 0.24 X10(3)/MCL (ref 0–0.9)
EOSINOPHIL NFR BLD AUTO: 2.1 %
ERYTHROCYTE [DISTWIDTH] IN BLOOD BY AUTOMATED COUNT: 16.8 % (ref 11.5–17)
GFR SERPLBLD CREATININE-BSD FMLA CKD-EPI: >60 ML/MIN/1.73/M2
GLOBULIN SER-MCNC: 3.5 GM/DL (ref 2.4–3.5)
GLUCOSE SERPL-MCNC: 71 MG/DL (ref 82–115)
GRAM STN SPEC: ABNORMAL
HCT VFR BLD AUTO: 26.3 % (ref 42–52)
HGB BLD-MCNC: 7.9 G/DL (ref 14–18)
IMM GRANULOCYTES # BLD AUTO: 0.15 X10(3)/MCL (ref 0–0.04)
IMM GRANULOCYTES NFR BLD AUTO: 1.3 %
LYMPHOCYTES # BLD AUTO: 1.45 X10(3)/MCL (ref 0.6–4.6)
LYMPHOCYTES NFR BLD AUTO: 12.5 %
MAGNESIUM SERPL-MCNC: 2.3 MG/DL (ref 1.6–2.6)
MCH RBC QN AUTO: 25.8 PG (ref 27–31)
MCHC RBC AUTO-ENTMCNC: 30 G/DL (ref 33–36)
MCV RBC AUTO: 85.9 FL (ref 80–94)
MONOCYTES # BLD AUTO: 0.85 X10(3)/MCL (ref 0.1–1.3)
MONOCYTES NFR BLD AUTO: 7.3 %
NEUTROPHILS # BLD AUTO: 8.86 X10(3)/MCL (ref 2.1–9.2)
NEUTROPHILS NFR BLD AUTO: 76.3 %
NRBC BLD AUTO-RTO: 0.2 %
OHS QRS DURATION: 82 MS
OHS QTC CALCULATION: 391 MS
PLATELET # BLD AUTO: 193 X10(3)/MCL (ref 130–400)
PMV BLD AUTO: 12.1 FL (ref 7.4–10.4)
POTASSIUM SERPL-SCNC: 3.6 MMOL/L (ref 3.5–5.1)
PROT SERPL-MCNC: 5.3 GM/DL (ref 5.8–7.6)
RBC # BLD AUTO: 3.06 X10(6)/MCL (ref 4.7–6.1)
SODIUM SERPL-SCNC: 153 MMOL/L (ref 136–145)
WBC # BLD AUTO: 11.61 X10(3)/MCL (ref 4.5–11.5)

## 2024-10-24 PROCEDURE — 99232 SBSQ HOSP IP/OBS MODERATE 35: CPT | Mod: ,,, | Performed by: GENERAL PRACTICE

## 2024-10-24 PROCEDURE — 25000003 PHARM REV CODE 250

## 2024-10-24 PROCEDURE — 99900035 HC TECH TIME PER 15 MIN (STAT)

## 2024-10-24 PROCEDURE — 20000000 HC ICU ROOM

## 2024-10-24 PROCEDURE — 99900031 HC PATIENT EDUCATION (STAT)

## 2024-10-24 PROCEDURE — 25000003 PHARM REV CODE 250: Performed by: NURSE PRACTITIONER

## 2024-10-24 PROCEDURE — 99900026 HC AIRWAY MAINTENANCE (STAT)

## 2024-10-24 PROCEDURE — 27000207 HC ISOLATION

## 2024-10-24 PROCEDURE — 36415 COLL VENOUS BLD VENIPUNCTURE: CPT

## 2024-10-24 PROCEDURE — 27100171 HC OXYGEN HIGH FLOW UP TO 24 HOURS

## 2024-10-24 PROCEDURE — 63600175 PHARM REV CODE 636 W HCPCS: Performed by: GENERAL PRACTICE

## 2024-10-24 PROCEDURE — 27200966 HC CLOSED SUCTION SYSTEM

## 2024-10-24 PROCEDURE — 94760 N-INVAS EAR/PLS OXIMETRY 1: CPT

## 2024-10-24 PROCEDURE — 83735 ASSAY OF MAGNESIUM: CPT

## 2024-10-24 PROCEDURE — 80053 COMPREHEN METABOLIC PANEL: CPT

## 2024-10-24 PROCEDURE — 94003 VENT MGMT INPAT SUBQ DAY: CPT

## 2024-10-24 PROCEDURE — 85025 COMPLETE CBC W/AUTO DIFF WBC: CPT

## 2024-10-24 PROCEDURE — 25000003 PHARM REV CODE 250: Performed by: GENERAL PRACTICE

## 2024-10-24 PROCEDURE — 63600175 PHARM REV CODE 636 W HCPCS

## 2024-10-24 RX ADMIN — SODIUM CHLORIDE 2 G: 9 INJECTION, SOLUTION INTRAVENOUS at 06:10

## 2024-10-24 RX ADMIN — QUETIAPINE FUMARATE 25 MG: 25 TABLET ORAL at 08:10

## 2024-10-24 RX ADMIN — PANTOPRAZOLE SODIUM 40 MG: 40 INJECTION, POWDER, LYOPHILIZED, FOR SOLUTION INTRAVENOUS at 08:10

## 2024-10-24 RX ADMIN — SODIUM CHLORIDE 2 G: 9 INJECTION, SOLUTION INTRAVENOUS at 10:10

## 2024-10-24 RX ADMIN — MUPIROCIN: 20 OINTMENT TOPICAL at 08:10

## 2024-10-24 RX ADMIN — RIVAROXABAN 20 MG: 10 TABLET, FILM COATED ORAL at 08:10

## 2024-10-24 RX ADMIN — METOPROLOL TARTRATE 25 MG: 25 TABLET, FILM COATED ORAL at 08:10

## 2024-10-24 RX ADMIN — LINEZOLID 600 MG: 600 INJECTION, SOLUTION INTRAVENOUS at 08:10

## 2024-10-24 RX ADMIN — METOCLOPRAMIDE HYDROCHLORIDE 10 MG: 5 SOLUTION ORAL at 10:10

## 2024-10-24 RX ADMIN — ATORVASTATIN CALCIUM 10 MG: 10 TABLET, FILM COATED ORAL at 08:10

## 2024-10-24 RX ADMIN — SODIUM CHLORIDE 2 G: 9 INJECTION, SOLUTION INTRAVENOUS at 03:10

## 2024-10-24 RX ADMIN — METOCLOPRAMIDE HYDROCHLORIDE 10 MG: 5 SOLUTION ORAL at 03:10

## 2024-10-24 RX ADMIN — METOPROLOL TARTRATE 25 MG: 25 TABLET, FILM COATED ORAL at 03:10

## 2024-10-24 RX ADMIN — METOCLOPRAMIDE HYDROCHLORIDE 10 MG: 5 SOLUTION ORAL at 06:10

## 2024-10-24 NOTE — PROGRESS NOTES
[facility] - Emergency Dept  Pulmonary Critical Care Note    Patient Name: Delio Daniel Jr.  MRN: [id number]  Admission Date: 10/20/2024  Hospital Length of Stay: 4 days  Code Status: Full Code  Attending Provider: Itz Francisco MD  Primary Care Provider: Jl Briones MD     Subjective:     HPI:   Patient is a 68 y/o male with extensive PMH who presented from NH on 10/20/24 with decreased responsiveness, severe sepsis, and recurrent UTI. 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     Patient transferred to ED from [facility] with lethargy and tachycardia. Family at bedside reports patient is nonverbal at baseline but typically more alert. In the ED, patient is febrile, tachycardic with -190s, tachypneic, /60. EKG shows Afib with RVR. Diltiazem drip started in ED. Labs are significant for WBC of 16.5, lactic acid of 3.3, Cr 1.48, BUN 45.3, Na 155, K+ 5.1, troponin elevated at 0.118. UA with evidence of UTI. Of note, pt was being treated outpatient for a UTI, currently on day 4 of 7 day Rocephin course. Urine culture 10/16/24 with multidrug resistant Klebsiella pneumonia and Proteus mirabilis with susceptibility to Cefepime. Patient received 3L of NS and initiated on Vanc and Cefepime in ED. Admitted to the ICU on mechanical ventilation via trach.      Hospital Course/Significant events:  10/20/24: Admitted to ICU for severe sepsis     24 Hour Interval History:  No acute events overnight. Afebrile. Blood culture positive for VRE Enterococcus faecium and ESBL Klebsiella pneumoniae with sputum culture positive for Klebsiella pneumoniae and Proteus mirabilis. Repeat blood cultures  with no growth at 24 hours.  Echocardiogram obtained yesterday revealing no evidence of endocarditis.  Infectious Disease following, currently on merrem and linezolid.        Past Medical History:   Diagnosis Date    Arthritis     Atrial fibrillation     BPH (benign prostatic hyperplasia)     Cardiac arrest     Coronary artery disease     Cyst, kidney, acquired     Diverticulosis     Hyperlipidemia     Hypertension     MI (myocardial infarction)     Obesity     Steatosis of liver     Stroke        Past Surgical History:   Procedure Laterality Date    A-V CARDIAC PACEMAKER INSERTION Right     CARDIAC CATHETERIZATION      COLONOSCOPY W/ BIOPSIES      CRANIECTOMY Right 12/20/2023    Procedure: CRANIECTOMY;  Surgeon: Artem Can MD;  Location: [facility] OR;  Service: Neurosurgery;  Laterality: Right;    ESOPHAGOGASTRODUODENOSCOPY W/ PEG N/A 1/2/2024    Procedure: PEG;  Surgeon: Tani Day MD;  Location: [facility] ENDOSCOPY;  Service: Gastroenterology;  Laterality: N/A;    excision of colon      TRACHEOSTOMY N/A 12/29/2023    Procedure: CREATION, TRACHEOSTOMY;  Surgeon: Patricia Winslow MD;  Location: [facility] OR;  Service: ENT;  Laterality: N/A;  REQ 1130 //  NEEDS 2 SCRUBS       Social History     Socioeconomic History    Marital status:     Number of children: 9   Occupational History    Occupation: retired   Tobacco Use    Smoking status: Never    Smokeless tobacco: Never   Substance and Sexual Activity    Alcohol use: Not Currently    Drug use: Not Currently    Sexual activity: Not Currently     Partners: Female     Social Drivers of Health     Financial Resource Strain: Patient Unable To Answer (10/21/2024)    Overall Financial Resource Strain (CARDIA)     Difficulty of Paying Living Expenses: Patient unable to answer   Food Insecurity: Patient Unable To Answer (10/21/2024)    Hunger Vital Sign     Worried About Running Out of Food in the Last Year: Patient unable to answer     Ran Out of Food in the Last  Year: Patient unable to answer   Transportation Needs: Patient Unable To Answer (10/21/2024)    TRANSPORTATION NEEDS     Transportation : Patient unable to answer   Physical Activity: Sufficiently Active (8/5/2024)    Exercise Vital Sign     Days of Exercise per Week: 5 days     Minutes of Exercise per Session: 30 min   Stress: Patient Unable To Answer (10/21/2024)    [employer] [place] [employer] - Occupational Stress Questionnaire     Feeling of Stress : Patient unable to answer   Housing Stability: Patient Unable To Answer (10/21/2024)    Housing Stability Vital Sign     Unable to Pay for Housing in the Last Year: Patient unable to answer     Homeless in the Last Year: Patient unable to answer           Current Outpatient Medications   Medication Instructions    ascorbic Acid (VITAMIN C) 500 mg, 2 times daily, Per PEG tube    busPIRone (BUSPAR) 5 mg, Per G Tube, 3 times daily    cholestyramine (QUESTRAN) 4 gram packet 4 g, Daily, Via PEG tube    cholestyramine-aspartame (QUESTRAN LIGHT) 4 gram PwPk 4 g, Per G Tube, 2 times daily    diltiaZEM (CARDIZEM) 60 mg, Per G Tube, Every 6 hours    ferrous sulfate 300 mg, Oral, Daily    finasteride (PROSCAR) 5 mg, Oral, Daily    furosemide (LASIX) 80 mg, Per G Tube, As needed (PRN)    L. acidophilus/L.bulgaricus (FLORANEX ORAL) 1 packet, PEG Tube, Daily    levetiracetam 1,500 mg, Per G Tube, 2 times daily, Nursing home reports medication hold by MD until level redraw on Monday.    LIPITOR 10 mg, Per G Tube, Daily    metoclopramide HCl (REGLAN) 10 mg, Per G Tube, 3 times daily before meals    metoprolol tartrate (LOPRESSOR) 100 mg, Per G Tube, 2 times daily    miconazole NITRATE 2 % (MICOTIN) 2 % top powder Topical (Top), 2 times daily    multivitamin (THERAGRAN) per tablet 1 tablet, Per G Tube, Daily    pantoprazole (PROTONIX) 40 mg, Intravenous, 2 times daily    polyethylene glycol (GLYCOLAX) 17 g, Oral, 2 times daily PRN    protein supplement (PROMOD PROTEIN  ORAL) 30 mLs, Per G Tube, Daily    QUEtiapine (SEROQUEL) 25 mg, Per G Tube, 2 times daily    scopolamine (TRANSDERM-SCOP) 1.3-1.5 mg (1 mg over 3 days) 1 patch, Transdermal, Every 3 days    sucralfate (CARAFATE) 1 g, Per G Tube, Before meals & nightly    tamsulosin (FLOMAX) 0.4 mg, Oral, Nightly    venlafaxine 75 mg TR24 1 tablet, Per G Tube, 2 times daily    XARELTO 20 mg Tab 1 tablet, Per G Tube, Nightly       Current Inpatient Medications   atorvastatin  10 mg Per G Tube Daily    linezolid  600 mg Intravenous Q12H    meropenem IV (PEDS and ADULTS)  2 g Intravenous Q8H    metoclopramide HCl  10 mg Oral TID AC    metoprolol tartrate  25 mg Per G Tube TID    mupirocin   Nasal BID    pantoprazole  40 mg Intravenous Daily    QUEtiapine  25 mg Per G Tube BID    rivaroxaban  20 mg Per G Tube Nightly    scopolamine  1 patch Transdermal Q3 Days       Current Intravenous Infusions          Review of Systems   Unable to perform ROS: Mental status change          Objective:       Intake/Output Summary (Last 24 hours) at 10/24/2024 0708  Last data filed at 10/24/2024 0608  Gross per 24 hour   Intake 894.81 ml   Output 730 ml   Net 164.81 ml         Vital Signs (Most Recent):  Temp: 98.5 °F (36.9 °C) (10/24/24 0400)  Pulse: 97 (10/24/24 0630)  Resp: 20 (10/24/24 0630)  BP: 105/69 (10/24/24 0600)  SpO2: 100 % (10/24/24 0630)  Body mass index is 22.49 kg/m².  Weight: 69.1 kg (152 lb 5.4 oz) Vital Signs (24h Range):  Temp:  [98.5 °F (36.9 °C)-99.2 °F (37.3 °C)] 98.5 °F (36.9 °C)  Pulse:  [] 97  Resp:  [13-26] 20  SpO2:  [98 %-100 %] 100 %  BP: ()/(62-80) 105/69     Physical Exam  Constitutional:       General: He is not in acute distress.     Appearance: He is ill-appearing.      Comments: Thin, Chronically ill-appearing   HENT:      Mouth/Throat:      Mouth: Mucous membranes are dry.   Cardiovascular:      Rate and Rhythm: Tachycardia present. Rhythm irregular.   Abdominal:      General: There is no distension.       Palpations: Abdomen is soft.      Comments: PEG tube in place    Musculoskeletal:      Comments: LUE and LLE in contracture    Skin:     General: Skin is warm and dry.      Comments: Large sacral pressure ulcer. BLE with scattered superficial abrasions.    Neurological:      Comments: Non-sedated. Nonverbal at baseline. No spontaneous eye opening. Corneal reflex intact. Does not follow commands. Winces and withdraws to painful stimuli in RUE and RLE.           Lines/Drains/Airways       Drain  Duration                  Gastrostomy/Enterostomy 01/02/24 1230  days         Urethral Catheter 10/20/24 2210 Silicone 16 Fr. 3 days              Airway  Duration             Adult Surgical Airway 08/19/24 0120 Shiley Extra Large Cuffed Distal 6.0/ 75mm 66 days              Peripheral Intravenous Line  Duration                  Midline Catheter - Single Lumen 10/20/24 1530 Right 3 days         Peripheral IV - Single Lumen 10/20/24 1600 18 G Anterior;Distal;Left Upper Arm 3 days                    Significant Labs:    Lab Results   Component Value Date    WBC 11.61 (H) 10/24/2024    HGB 7.9 (L) 10/24/2024    HCT 26.3 (L) 10/24/2024    MCV 85.9 10/24/2024     10/24/2024           BMP  Lab Results   Component Value Date     (H) 10/24/2024    K 3.6 10/24/2024    CO2 19 (L) 10/24/2024    BUN 16.7 10/24/2024    CREATININE 0.71 (L) 10/24/2024    CALCIUM 7.2 (L) 10/24/2024    AGAP 11.0 10/24/2024    EGFRNONAA 61 04/23/2022         ABG  Recent Labs   Lab 10/21/24  0625   PH 7.460*   PO2 63.0*   PCO2 35.0   HCO3 24.9   POCBASEDEF 1.30       Mechanical Ventilation Support:  Vent Mode: A/C (10/24/24 0617)  Ventilator Initiated: Yes (10/20/24 1546)  Set Rate: 20 BPM (10/24/24 0617)  Vt Set: 500 mL (10/24/24 0617)  PEEP/CPAP: 5 cmH20 (10/24/24 0617)  Oxygen Concentration (%): 40 (10/24/24 0617)  Peak Airway Pressure: 17 cmH20 (10/24/24 0617)  Total Ve: 6.4 L/m (10/24/24 0617)  F/VT Ratio<105 (RSBI): (!) 66.23 (10/24/24  0617)    Significant Imaging:  I have reviewed the pertinent imaging within the past 24 hours.    CT Abdomen Pelvis With IV Contrast NO Oral Contrast  Result Date: 10/21/2024  Interval development of sacral decubitus just to the right of the superior aspect of the vertebral fold.  No abscess or fluid collection is seen Findings seen consistent with urinary bladder wall thickening and inflammatory changes consistent with cystitis Interval development of bilateral patchy areas of pneumonia with developing consolidation in the lower lobes Electronically signed by: Erick Grant Date:    10/21/2024 Time:    17:22      Assessment/Plan:     Assessment  Sepsis  VRE bacteremia   ESBL Klebsiella bacteremia  Sputum culture: Klebsiella pneumoniae and Proteus mirabilis  UTI  Afib with RVR  Hypernatremia   Sacral wound      Plan  Admitted to ICU   On mechanical ventilation via trach   CIS following, patient received digoxin on 10/21/2024., started lopressor 25 mg TID  Echocardiogram with no evidence of endocarditis.  Repeat blood cultures every 48 hours until negative.  Blood cultures on 10/22/2024 no growth at 24 hours  Urine culture with no growth to date, blood and respiratory cultures as above  ID following, discontinued cefepime and dapto on 10/23, continue merrem (10/23-present) and linezolid (10/21-present)  Wound care and General surgery consulted for pressure ulcer of the sacrum, recommend nutritional optimization and continued wound care    DVT Prophylaxis: SCDs, continue home Xarelto   GI Prophylaxis: PPI     32 minutes of critical care was time spent personally by me on the following activities: development of treatment plan with patient or surrogate and bedside caregivers, discussions with consultants, evaluation of patient's response to treatment, examination of patient, ordering and performing treatments and interventions, ordering and review of laboratory studies, ordering and review of radiographic studies,  pulse oximetry, re-evaluation of patient's condition.  This critical care time did not overlap with that of any other provider or involve time for any procedures.     Amina Dolan MD  Pulmonary Critical Care Medicine  [facility] - Emergency Dept  DOS: 10/24/2024

## 2024-10-24 NOTE — PROGRESS NOTES
Infectious Disease  Progress Note    Patient Name: Delio Daniel Jr.   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 On admission, noted to be hypotensive with Afib and RVR, recently had positive urine culture with Klebsiella and Proteus. He also has an advanced sacral ulcer. He was noted to have bacteremia with 2/4 of admission blood cultures being positive for Enterococcus faecium and ultimately noting it is VRE per BCID. ID consulted for assistance in management.      Bacteremia  VRE bacteremia  Klebsiella bacteremia  CVA with residual cognitive deficits, motor deficits  Tracheostomy and PEG tube dependent  Sacral ulcer   Recurrent UTI  Pacemaker In place  History of V fib arrest     -Stable no major changes    Recommendations:  -meropenem 1 g IV q.8 hours  -Linezolid 600mg q12h  -Ongoing goals of care conversation, will need a course of at least 2 weeks of antibiotics      Thank you for your consult. ID will continue following. Please contact us with any questions or concerns.     Antibiotics History:  Cefepime 10/20 - Present  Vancomycin 10/20  Daptomycin 10/21 - Present  Linezolid 10/21 - Present        Portions of this note dictated using EMR integrated voice recognition software, and may be subject to voice recognition errors not corrected at proofreading. Please contact  writer for clarification if needed.    Subjective:     Principal Problem: UTI (urinary tract infection)     Interval History:   Generally unchanged, non engaging with evaluation    Review of Systems   Review of Systems   Unable to perform ROS: Medical condition        Objective:     Vital Signs (Most Recent):  Temp: 98.8 °F (37.1 °C) (10/24/24 1600)  Pulse: 76 (10/24/24 1800)  Resp: 20 (10/24/24 1800)  BP: 109/83 (10/24/24 2014)  SpO2: 100 % (10/24/24 1800)  Vital Signs (24h Range):  Temp:  [98.3 °F (36.8 °C)-99 °F (37.2 °C)] 98.8 °F (37.1 °C)  Pulse:  [] 76  Resp:  [12-29] 20  SpO2:  [98 %-100 %] 100 %  BP: ()/(62-85) 109/83      Weight:   Wt Readings from Last 1 Encounters:   10/21/24 69.1 kg (152 lb 5.4 oz)      Body mass index is Body mass index is 22.49 kg/m².     Estimated Creatinine Clearance: Estimated Creatinine Clearance: 97.3 mL/min (A) (based on SCr of 0.71 mg/dL (L)).     Lines/Drains/Airways       Drain  Duration                  Gastrostomy/Enterostomy 01/02/24 1230  days         Urethral Catheter 10/20/24 2210 Silicone 16 Fr. 3 days              Airway  Duration             Adult Surgical Airway 08/19/24 0120 Shiley Extra Large Cuffed Distal 6.0/ 75mm 66 days              Peripheral Intravenous Line  Duration                  Midline Catheter - Single Lumen 10/20/24 1530 Right 4 days         Peripheral IV - Single Lumen 10/20/24 1600 18 G Anterior;Distal;Left Upper Arm 4 days                     Physical Exam  Physical Exam  Constitutional:       Appearance: He is ill-appearing (Chronically ill-appearing).   HENT:      Head: Normocephalic and atraumatic.      Mouth/Throat:      Pharynx: No oropharyngeal exudate or posterior oropharyngeal erythema.   Eyes:      Extraocular Movements: Extraocular movements intact.      Pupils: Pupils are equal, round, and reactive to light.   Cardiovascular:      Rate and Rhythm: Normal rate and regular rhythm.      Heart sounds: No murmur  heard.  Pulmonary:      Effort: No respiratory distress.      Breath sounds: No wheezing, rhonchi or rales.      Comments: Tracheostomy in place  Abdominal:      General: Bowel sounds are normal. There is no distension.      Palpations: Abdomen is soft.      Tenderness: There is no abdominal tenderness.      Comments: Peg tube in place   Musculoskeletal:         General: No swelling or tenderness.      Cervical back: Neck supple. No rigidity or tenderness.      Comments: Deep sacral ulcer, evaluated with the wound care physician today, slough, tunneling, foul-smelling, contaminated with feces   Lymphadenopathy:      Cervical: No cervical adenopathy.   Skin:     Findings: No lesion or rash.   Neurological:      Mental Status: He is alert.      Comments: At baseline, nonverbal, poorly participating with evaluation, left-sided contractures, not following command          Significant Labs: CBC:   Recent Labs   Lab 10/23/24  0337 10/24/24  0304   WBC 9.83 11.61*   HGB 8.2* 7.9*   HCT 29.0* 26.3*    193     CMP:   Recent Labs   Lab 10/23/24  0337 10/24/24  0304   * 153*   K 3.3* 3.6   * 123*   CO2 18* 19*   BUN 23.2 16.7   CREATININE 0.82 0.71*   CALCIUM 8.1* 7.2*   ALBUMIN 1.8* 1.8*   BILITOT 0.7 0.7   ALKPHOS 94 94   AST 13 12   ALT 8 8       Microbiology Results (last 7 days)       Procedure Component Value Units Date/Time    Blood culture #1 **CANNOT BE ORDERED STAT** [[id number]]  (Normal) Collected: 10/20/24 1603    Order Status: Completed Specimen: Blood Updated: 10/24/24 1701     Blood Culture No Growth At 96 Hours    Respiratory Culture [[id number]]  (Abnormal)  (Susceptibility) Collected: 10/20/24 2234    Order Status: Completed Specimen: Sputum Updated: 10/24/24 1313     Respiratory Culture Many Klebsiella pneumoniae esbl     Comment: with normal respiratory niyah         Moderate Proteus mirabilis     GRAM STAIN Quality 3+      Moderate Gram Negative Rods      Many Gram Positive Rods       Rare Yeast    Blood Culture [[id number]]  (Normal) Collected: 10/22/24 0758    Order Status: Completed Specimen: Blood from Hand, Right Updated: 10/24/24 0901     Blood Culture No Growth At 48 Hours    Blood Culture [[id number]]  (Normal) Collected: 10/22/24 0758    Order Status: Completed Specimen: Blood from Hand, Left Updated: 10/24/24 0901     Blood Culture No Growth At 48 Hours    Blood culture #2 **CANNOT BE ORDERED STAT** [[id number]]  (Abnormal)  (Susceptibility) Collected: 10/20/24 1603    Order Status: Completed Specimen: Blood Updated: 10/23/24 0702     Blood Culture Enterococcus faecium VRE      Klebsiella pneumoniae esbl     GRAM STAIN Gram Positive Cocci, probable Streptococcus      Seen in gram stain of broth only      2 of 2 bottles positive    BCID2 Panel [[id number]]  (Abnormal) Collected: 10/20/24 1603    Order Status: Completed Specimen: Blood Updated: 10/23/24 0636     CTX-M (ESBL ) N/A     IMP (Cabapenemase ) N/A     KPC resistance gene (Carbapenemase ) N/A     mcr-1 N/A     mecA ID N/A     Comment: Note: Antimicrobial resistance can occur via multiple mechanisms. A Not Detected result for antimicrobial resistance gene(s) does not indicate antimicrobial susceptibility. Subculturing is required for species identification and susceptibility testing of   isolates.        mecA/C and MREJ (MRSA) gene N/A     NDM (Carbapenemase ) N/A     OXA-48-like (Carbapenemase ) N/A     Sofi/B (VRE gene) Detected     VIM (Carbapenemase ) N/A     Enterococcus faecalis Not Detected     Enterococcus faecium Detected     Listeria monocytogenes Not Detected     Staphylococcus spp. Not Detected     Staphylococcus aureus Not Detected     Staphylococcus epidermidis Not Detected     Staphylococcus lugdunensis Not Detected     Streptococcus spp. Not Detected     Streptococcus agalactiae (Group B) Not Detected     Streptococcus pneumoniae Not Detected     Streptococcus  pyogenes (Group A) Not Detected     Acinetobacter calcoaceticus/baumannii complex Not Detected     Bacteroides fragilis Not Detected     Enterobacterales Not Detected     Enterobacter cloacae complex Not Detected     Escherichia coli Not Detected     Klebsiella aerogenes Not Detected     Klebsiella oxytoca Not Detected     Klebsiella pneumoniae group Detected     Comment: This is a corrected result. Previous result was Not Detected on 10/21/2024 at 1146 CDT.        Proteus spp. Not Detected     Salmonella spp. Not Detected     Serratia marcescens Not Detected     Haemophilus influenzae Not Detected     Neisseria meningitidis Not Detected     Pseudomonas aeruginosa Not Detected     Stenotrophomonas maltophilia Not Detected     Candida albicans Not Detected     Candida auris Not Detected     Candida glabrata Not Detected     Candida krusei Not Detected     Candida parapsilosis Not Detected     Candida tropicalis Not Detected     Cryptococcus neoformans/gattii Not Detected    Narrative:      The [employer] BCID2 Panel is a multiplexed nucleic acid test intended for the use with [employer]® 2.0 or [employer]® Webflakes Systems for the simultaneous qualitative detection and identification of multiple bacterial and yeast nucleic acids and select genetic determinants associated with antimicrobial resistance.  The [employer] BCID2 Panel test is performed directly on blood culture samples identified as positive by a continuous monitoring blood culture system.  Results are intended to be interpreted in conjunction with Gram stain results.    Urine culture [[id number]] Collected: 10/20/24 1654    Order Status: Completed Specimen: Urine Updated: 10/22/24 0725     Urine Culture No Growth             Significant Imaging: I have reviewed all pertinent imaging results/findings within the past 24 hours.      Kenneth Bhardwaj MD  Infectious Disease  [facility]

## 2024-10-24 NOTE — CONSULTS
Inpatient consult to Palliative Care  Consult performed by: Betty Daigle FNP  Consult ordered by: Itz Francisco MD  Reason for consult: Goals of Care and Advance Care Planning      Patient Name: Delio Daniel Jr.   MRN: [id number]   Admission Date: 10/20/2024   Hospital Length of Stay: 4   Attending Provider: Itz Francisco MD   Consulting Provider: Palliative Care Team   Reason for Consult: Goals of Care  Primary Care Physician: Jl Briones MD     Principal Problem: UTI (urinary tract infection)     Patient information was obtained from patient, relative(s), past medical records, ER records, and primary team.      Final diagnoses:  [A41.9] Sepsis  [R50.9] Fever, unspecified fever cause (Primary)  [R00.0] Tachycardia, unspecified  [N39.0] Urinary tract infection without hematuria, site unspecified     Patient Summary:    Patient is a 69 year old single male, with 9 adult children. 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 Patient presented to the emergency department from the nursing home on 10/20/2024 with c/o decreased responsiveness, severe sepsis, and recurrent UTI. Was being treated outpatient with rocephine. Urine culture 10/16/24 with multidrug resistant Klebsiella pneumonia and Proteus mirabilis with susceptibility to Cefepime. Patient received 3L of NS and initiated on Vanc and Cefepime in ED. Admitted to the ICU on mechanical ventilation via trach.     Medical Team Note Summary  General Surgery:consulted for sacral decub ulcer and consideration for diverting ostomy. No acute surgical intervention for debridement or diverting ostomy at this time. Will defer surgical intervention at this time  until goals of care discussion with palliative care. Patient's high risk for poor wound healing, defer diverting ostomy in the setting of decompensation with acute infections due to high risk for poor wound healing,     Infectious Disease: consulted for assistance in management. patient is quite ill with multiple comorbidities, has been declining and worsening over the past year, appropriate to engage goals of care conversation with the patient's family and consider palliative care evaluation to help determine those goals of care      Cardiology: [facility] consulted for AF Management. Consider Palliative care consultation for goals of care discussion.     Palliative Care consulted to discuss goals of care and advance care planning.     Assessment/Plan:     I reviewed the patient and family's understanding of the seriousness of the illness and its expected prognosis. We discussed the patient's goals of care and treatment preferences. We discussed the patient's chosen code status as listed. I answered all questions and we formulated a plan including recommendations for symptom management and how to best achieve goals of care.       Advance Care Planning     Date: 10/24/2024    [facility]  I engaged the family in a voluntary conversation about advance care planning and we specifically addressed what the goals of care would be moving forward, in light of the patient's change in clinical status, specifically current condition, goals of care and medical wishes. Patient has a total of 9 children, attempting to arrange a meeting to discuss with at least majority of the children. Goals and medical wishes continue to be determined. Patient does have a signed [place] stating A. Full Code, B. Full Treatment and C. Long term artificial nutrition.  We did specifically address the patient's likely prognosis, which is fair .  We explored the patient's values and preferences for future care.  The patient and family endorses that what is most  "important right now is to focus on curative/life-prolongation (regardless of treatment burdens)    Accordingly, we have decided that the best plan to meet the patient's goals includes continuing with treatment      Recommendations:     Spoke to Palliative Team Physician/NP - reviewed patient's current status and plan of care in detail. The following recommendations/orders were given:     Palliative attempting to arrange a meeting with patient's 9 children, hopefully prior to the weekend. Carmen, one of patient's daughter's is speaking to all the patient's children to arrange a time to meet with the medical team to discuss goals of care and patient's medical wishes. She is to notify the medical team on when a majority of the children can meet.     Palliative Team will continue to provided emotional support, education, and make adjustments to plan of care to meet patient's needs throughout hospital stay.         Objective:   BP 98/77 (BP Location: Left arm, Patient Position: Lying)   Pulse 92   Temp 98.3 °F (36.8 °C) (Oral)   Resp (!) 29   Ht 5' 9.02" (1.753 m)   Wt 69.1 kg (152 lb 5.4 oz)   SpO2 100%   BMI 22.49 kg/m²      PAINAD: NA    Physical Exam  Vitals and nursing note reviewed.   Constitutional:       Appearance: He is ill-appearing.      Comments: Trach on vent   HENT:      Head:      Comments: No brain flap right side  Eyes:      Pupils: Pupils are equal, round, and reactive to light.      Comments: Does not tract   Neurological:      Mental Status: He is alert.      Motor: Weakness present.   Psychiatric:         Speech: He is noncommunicative.        Review of Symptoms      Symptom Assessment (ESAS 0-10 Scale)  Pain:  0  Dyspnea:  0  Anxiety:  0  Nausea:  0  Depression:  0  Anorexia:  0  Fatigue:  0  Insomnia:  0  Restlessness:  0  Agitation:  0         Performance Status:  20    Living Arrangements:  Lives in nursing home    Psychosocial/Cultural:   See Palliative Psychosocial Note: Yes  Patient is " single with 9 living adult children, no POA. Patient is a resident at nursing facility.   **Primary  to Follow**  Palliative Care  Consult: No    Spiritual:  F - Ayanna and Belief:  Y  I - Importance:  Y  C - Community:  Y  A - Address in Care:  Y      Advance Care Planning   Advance Care Planning           FAMILY CONTACTS: Spoke to patient's daughter Carmen via telephone [phone]. Messages left on vm of other listed contacts.     Caregiver burden formerly assessed: Yes        SHANNAN Stephens, BSN-RN, PN  Palliative Medicine  [facility] - Palliative Team

## 2024-10-24 NOTE — PLAN OF CARE
Sent clinical updates to [facility]. Made facility aware this patient is not currently ready for discharge, per patient's nurse, and there is no expected discharge at this time.

## 2024-10-25 LAB
ALBUMIN SERPL-MCNC: 2 G/DL (ref 3.4–4.8)
ALBUMIN/GLOB SERPL: 0.5 RATIO (ref 1.1–2)
ALP SERPL-CCNC: 104 UNIT/L (ref 40–150)
ALT SERPL-CCNC: 11 UNIT/L (ref 0–55)
ANION GAP SERPL CALC-SCNC: 15 MEQ/L
AST SERPL-CCNC: 21 UNIT/L (ref 5–34)
BACTERIA BLD CULT: NORMAL
BASOPHILS # BLD AUTO: 0.05 X10(3)/MCL
BASOPHILS NFR BLD AUTO: 0.4 %
BILIRUB SERPL-MCNC: 0.6 MG/DL
BUN SERPL-MCNC: 15.3 MG/DL (ref 8.4–25.7)
CALCIUM SERPL-MCNC: 7.7 MG/DL (ref 8.8–10)
CHLORIDE SERPL-SCNC: 116 MMOL/L (ref 98–107)
CO2 SERPL-SCNC: 19 MMOL/L (ref 23–31)
CREAT SERPL-MCNC: 0.7 MG/DL (ref 0.72–1.25)
CREAT/UREA NIT SERPL: 22
EOSINOPHIL # BLD AUTO: 0.33 X10(3)/MCL (ref 0–0.9)
EOSINOPHIL NFR BLD AUTO: 2.7 %
ERYTHROCYTE [DISTWIDTH] IN BLOOD BY AUTOMATED COUNT: 16.7 % (ref 11.5–17)
GFR SERPLBLD CREATININE-BSD FMLA CKD-EPI: >60 ML/MIN/1.73/M2
GLOBULIN SER-MCNC: 4.2 GM/DL (ref 2.4–3.5)
GLUCOSE SERPL-MCNC: 71 MG/DL (ref 82–115)
HCT VFR BLD AUTO: 30.6 % (ref 42–52)
HGB BLD-MCNC: 9.2 G/DL (ref 14–18)
IMM GRANULOCYTES # BLD AUTO: 0.29 X10(3)/MCL (ref 0–0.04)
IMM GRANULOCYTES NFR BLD AUTO: 2.4 %
LYMPHOCYTES # BLD AUTO: 1.79 X10(3)/MCL (ref 0.6–4.6)
LYMPHOCYTES NFR BLD AUTO: 14.6 %
MAGNESIUM SERPL-MCNC: 2.2 MG/DL (ref 1.6–2.6)
MCH RBC QN AUTO: 26.2 PG (ref 27–31)
MCHC RBC AUTO-ENTMCNC: 30.1 G/DL (ref 33–36)
MCV RBC AUTO: 87.2 FL (ref 80–94)
MONOCYTES # BLD AUTO: 1.08 X10(3)/MCL (ref 0.1–1.3)
MONOCYTES NFR BLD AUTO: 8.8 %
NEUTROPHILS # BLD AUTO: 8.73 X10(3)/MCL (ref 2.1–9.2)
NEUTROPHILS NFR BLD AUTO: 71.1 %
NRBC BLD AUTO-RTO: 0.2 %
PLATELET # BLD AUTO: 211 X10(3)/MCL (ref 130–400)
PMV BLD AUTO: 11.7 FL (ref 7.4–10.4)
POTASSIUM SERPL-SCNC: 3.6 MMOL/L (ref 3.5–5.1)
PROT SERPL-MCNC: 6.2 GM/DL (ref 5.8–7.6)
RBC # BLD AUTO: 3.51 X10(6)/MCL (ref 4.7–6.1)
SODIUM SERPL-SCNC: 150 MMOL/L (ref 136–145)
WBC # BLD AUTO: 12.27 X10(3)/MCL (ref 4.5–11.5)

## 2024-10-25 PROCEDURE — 94761 N-INVAS EAR/PLS OXIMETRY MLT: CPT

## 2024-10-25 PROCEDURE — 25000003 PHARM REV CODE 250

## 2024-10-25 PROCEDURE — 99900026 HC AIRWAY MAINTENANCE (STAT)

## 2024-10-25 PROCEDURE — 94003 VENT MGMT INPAT SUBQ DAY: CPT

## 2024-10-25 PROCEDURE — 94760 N-INVAS EAR/PLS OXIMETRY 1: CPT

## 2024-10-25 PROCEDURE — 20000000 HC ICU ROOM

## 2024-10-25 PROCEDURE — 63600175 PHARM REV CODE 636 W HCPCS: Performed by: GENERAL PRACTICE

## 2024-10-25 PROCEDURE — 80053 COMPREHEN METABOLIC PANEL: CPT

## 2024-10-25 PROCEDURE — 25000003 PHARM REV CODE 250: Performed by: GENERAL PRACTICE

## 2024-10-25 PROCEDURE — 99900035 HC TECH TIME PER 15 MIN (STAT)

## 2024-10-25 PROCEDURE — 25000003 PHARM REV CODE 250: Performed by: NURSE PRACTITIONER

## 2024-10-25 PROCEDURE — 27000207 HC ISOLATION

## 2024-10-25 PROCEDURE — 27100171 HC OXYGEN HIGH FLOW UP TO 24 HOURS

## 2024-10-25 PROCEDURE — 36415 COLL VENOUS BLD VENIPUNCTURE: CPT

## 2024-10-25 PROCEDURE — 63600175 PHARM REV CODE 636 W HCPCS

## 2024-10-25 PROCEDURE — 99233 SBSQ HOSP IP/OBS HIGH 50: CPT | Mod: ,,, | Performed by: HOSPITALIST

## 2024-10-25 PROCEDURE — 83735 ASSAY OF MAGNESIUM: CPT

## 2024-10-25 PROCEDURE — 85025 COMPLETE CBC W/AUTO DIFF WBC: CPT

## 2024-10-25 PROCEDURE — 99900031 HC PATIENT EDUCATION (STAT)

## 2024-10-25 PROCEDURE — 99900022

## 2024-10-25 PROCEDURE — 63600175 PHARM REV CODE 636 W HCPCS: Performed by: STUDENT IN AN ORGANIZED HEALTH CARE EDUCATION/TRAINING PROGRAM

## 2024-10-25 RX ADMIN — LINEZOLID 600 MG: 600 INJECTION, SOLUTION INTRAVENOUS at 08:10

## 2024-10-25 RX ADMIN — METOCLOPRAMIDE HYDROCHLORIDE 10 MG: 5 SOLUTION ORAL at 06:10

## 2024-10-25 RX ADMIN — QUETIAPINE FUMARATE 25 MG: 25 TABLET ORAL at 08:10

## 2024-10-25 RX ADMIN — METOCLOPRAMIDE HYDROCHLORIDE 10 MG: 5 SOLUTION ORAL at 10:10

## 2024-10-25 RX ADMIN — RIVAROXABAN 20 MG: 10 TABLET, FILM COATED ORAL at 08:10

## 2024-10-25 RX ADMIN — METOPROLOL TARTRATE 25 MG: 25 TABLET, FILM COATED ORAL at 08:10

## 2024-10-25 RX ADMIN — SODIUM CHLORIDE 2 G: 9 INJECTION, SOLUTION INTRAVENOUS at 10:10

## 2024-10-25 RX ADMIN — SODIUM CHLORIDE, POTASSIUM CHLORIDE, SODIUM LACTATE AND CALCIUM CHLORIDE 500 ML: 600; 310; 30; 20 INJECTION, SOLUTION INTRAVENOUS at 04:10

## 2024-10-25 RX ADMIN — METOCLOPRAMIDE HYDROCHLORIDE 10 MG: 5 SOLUTION ORAL at 03:10

## 2024-10-25 RX ADMIN — SODIUM CHLORIDE 2 G: 9 INJECTION, SOLUTION INTRAVENOUS at 05:10

## 2024-10-25 RX ADMIN — MUPIROCIN: 20 OINTMENT TOPICAL at 08:10

## 2024-10-25 RX ADMIN — ATORVASTATIN CALCIUM 10 MG: 10 TABLET, FILM COATED ORAL at 08:10

## 2024-10-25 RX ADMIN — METOPROLOL TARTRATE 25 MG: 25 TABLET, FILM COATED ORAL at 03:10

## 2024-10-25 RX ADMIN — PANTOPRAZOLE SODIUM 40 MG: 40 INJECTION, POWDER, LYOPHILIZED, FOR SOLUTION INTRAVENOUS at 08:10

## 2024-10-25 RX ADMIN — SCOPOLAMINE 1 PATCH: 1 PATCH TRANSDERMAL at 08:10

## 2024-10-25 RX ADMIN — SODIUM CHLORIDE 2 G: 9 INJECTION, SOLUTION INTRAVENOUS at 02:10

## 2024-10-25 NOTE — PLAN OF CARE
Problem: Skin Injury Risk Increased  Goal: Skin Health and Integrity  10/25/2024 1807 by Juan Dee RN  Outcome: Not Progressing  10/25/2024 1807 by Juan Dee RN  Outcome: Progressing     Problem: Adult Inpatient Plan of Care  Goal: Plan of Care Review  10/25/2024 1807 by Juan Dee RN  Outcome: Not Progressing  10/25/2024 1807 by Juan Dee RN  Outcome: Progressing  Goal: Patient-Specific Goal (Individualized)  10/25/2024 1807 by Juan Dee RN  Outcome: Not Progressing  10/25/2024 1807 by Juan Dee RN  Outcome: Progressing  Goal: Absence of Hospital-Acquired Illness or Injury  10/25/2024 1807 by Juan Dee RN  Outcome: Not Progressing  10/25/2024 1807 by Juan Dee RN  Outcome: Progressing  Goal: Optimal Comfort and Wellbeing  10/25/2024 1807 by Juan Dee RN  Outcome: Not Progressing  10/25/2024 1807 by Juan Dee RN  Outcome: Progressing  Goal: Readiness for Transition of Care  10/25/2024 1807 by Juan Dee RN  Outcome: Not Progressing  10/25/2024 1807 by Juan Dee RN  Outcome: Progressing     Problem: Infection  Goal: Absence of Infection Signs and Symptoms  10/25/2024 1807 by Juan Dee RN  Outcome: Not Progressing  10/25/2024 1807 by Juan Dee RN  Outcome: Progressing     Problem: Sepsis/Septic Shock  Goal: Optimal Coping  10/25/2024 1807 by Juan Dee RN  Outcome: Not Progressing  10/25/2024 1807 by Juan Dee RN  Outcome: Progressing  Goal: Absence of Bleeding  10/25/2024 1807 by Juan Dee RN  Outcome: Not Progressing  10/25/2024 1807 by Juan Dee RN  Outcome: Progressing  Goal: Blood Glucose Level Within Targeted Range  10/25/2024 1807 by Juan Dee RN  Outcome: Not Progressing  10/25/2024 1807 by Juan Dee RN  Outcome: Progressing  Goal: Absence of Infection Signs and Symptoms  10/25/2024 1807 by Juan Dee RN  Outcome: Not Progressing  10/25/2024 1807 by Juan Dee RN  Outcome: Progressing  Goal: Optimal  Nutrition Intake  10/25/2024 1807 by Juan Dee RN  Outcome: Not Progressing  10/25/2024 1807 by Juan Dee RN  Outcome: Progressing     Problem: Pneumonia  Goal: Fluid Balance  10/25/2024 1807 by Juan Dee RN  Outcome: Not Progressing  10/25/2024 1807 by Juan Dee RN  Outcome: Progressing  Goal: Resolution of Infection Signs and Symptoms  10/25/2024 1807 by Juan Dee RN  Outcome: Not Progressing  10/25/2024 1807 by Juan Dee RN  Outcome: Progressing  Goal: Effective Oxygenation and Ventilation  10/25/2024 1807 by Juan Dee RN  Outcome: Not Progressing  10/25/2024 1807 by Juan Dee RN  Outcome: Progressing     Problem: Wound  Goal: Optimal Coping  10/25/2024 1807 by Juan Dee RN  Outcome: Not Progressing  10/25/2024 1807 by Juan Dee RN  Outcome: Progressing  Goal: Optimal Functional Ability  10/25/2024 1807 by Juan Dee RN  Outcome: Not Progressing  10/25/2024 1807 by Juan Dee RN  Outcome: Progressing  Goal: Absence of Infection Signs and Symptoms  10/25/2024 1807 by Juan Dee RN  Outcome: Not Progressing  10/25/2024 1807 by Juan Dee RN  Outcome: Progressing  Goal: Improved Oral Intake  10/25/2024 1807 by Juan Dee RN  Outcome: Not Progressing  10/25/2024 1807 by Juan Dee RN  Outcome: Progressing  Goal: Optimal Pain Control and Function  10/25/2024 1807 by Juan Dee RN  Outcome: Not Progressing  10/25/2024 1807 by Juan Dee RN  Outcome: Progressing  Goal: Skin Health and Integrity  10/25/2024 1807 by Juan Dee RN  Outcome: Not Progressing  10/25/2024 1807 by Juan Dee RN  Outcome: Progressing  Goal: Optimal Wound Healing  10/25/2024 1807 by Juan Dee RN  Outcome: Not Progressing  10/25/2024 1807 by Juan Dee RN  Outcome: Progressing     Problem: Fall Injury Risk  Goal: Absence of Fall and Fall-Related Injury  10/25/2024 1807 by Juan Dee, RN  Outcome: Not Progressing  10/25/2024 1807 by Juan Dee,  RN  Outcome: Progressing     Problem: Coping Ineffective  Goal: Effective Coping  10/25/2024 1807 by Juan Dee RN  Outcome: Not Progressing  10/25/2024 1807 by Juan Dee RN  Outcome: Progressing

## 2024-10-25 NOTE — PROGRESS NOTES
Inpatient Nutrition Assessment    Admit Date: 10/20/2024   Total duration of encounter: 5 days   Patient Age: 69 y.o.    Nutrition Recommendation/Prescription     Restart tube feeding when appropriate.  Tube feeding recommendation:     Impact Peptide 1.5 goal rate 70 ml/hr to provide  2100 kcal/d  (111% est needs)  131 g protein/d (100% est needs)  1078 ml free water/d (52% est needs)  (calculations based on estimated 20 hr/d run time)     If no IV fluids running, can give 100ml q 2hr water flushes. Total water provided: 2078ml (100% est needs.)     Patel (provides 90 kcal, 2.5 g protein per serving) BID.    May need to consider antiemetic along with Reglan per MD.    If intolerance continues, may need to consider PN. Consult RD if PN to start.     Communication of Recommendations: reviewed with nurse    Nutrition Assessment     Malnutrition Assessment/Nutrition-Focused Physical Exam    Malnutrition Context: chronic illness (10/21/24 1028)  Malnutrition Level: severe (10/21/24 1028)  Energy Intake (Malnutrition):  (unable to eval) (10/21/24 1028)  Weight Loss (Malnutrition): greater than 10% in 6 months (10/21/24 1028)  Subcutaneous Fat (Malnutrition): severe depletion (10/21/24 1028)  Orbital Region (Subcutaneous Fat Loss): severe depletion  Upper Arm Region (Subcutaneous Fat Loss): severe depletion     Muscle Mass (Malnutrition): severe depletion (10/21/24 1028)     Clavicle Bone Region (Muscle Loss): severe depletion  Clavicle and Acromion Bone Region (Muscle Loss): severe depletion  Scapular Bone Region (Muscle Loss): severe depletion              Fluid Accumulation (Malnutrition):  (does not meet criteria) (10/21/24 1028)        A minimum of two characteristics is recommended for diagnosis of either severe or non-severe malnutrition.    Chart Review    Reason Seen: continuous nutrition monitoring and physician consult for TF    Malnutrition Screening Tool Results   Have you recently lost weight without trying?:  Unsure  Have you been eating poorly because of a decreased appetite?: No   MST Score: 2   Diagnosis:  Severe sepsis   UTI  Afib with RVR  Hypernatremia   Sacral wound    Relevant Medical History:    Arthritis      Atrial fibrillation      BPH (benign prostatic hyperplasia)      Cardiac arrest      Coronary artery disease      Cyst, kidney, acquired      Diverticulosis      Hyperlipidemia      Hypertension      MI (myocardial infarction)      Obesity      Steatosis of liver      Stroke      Scheduled Medications:  atorvastatin, 10 mg, Daily  linezolid, 600 mg, Q12H  meropenem IV (PEDS and ADULTS), 2 g, Q8H  metoclopramide HCl, 10 mg, TID AC  metoprolol tartrate, 25 mg, TID  mupirocin, , BID  pantoprazole, 40 mg, Daily  QUEtiapine, 25 mg, BID  rivaroxaban, 20 mg, Nightly  scopolamine, 1 patch, Q3 Days    Continuous Infusions:     PRN Medications:  acetaminophen, 650 mg, Q6H PRN  dextromethorphan-guaiFENesin  mg/5 ml, 5 mL, Q4H PRN  dextrose 10%, 12.5 g, PRN  dextrose 10%, 25 g, PRN  glucagon (human recombinant), 1 mg, PRN  hydrALAZINE, 10 mg, Q4H PRN  metoprolol, 5 mg, Q5 Min PRN  naloxone, 0.02 mg, PRN  sodium chloride 0.9%, 10 mL, Q12H PRN    Calorie Containing IV Medications: no significant kcals from medications at this time    Recent Labs   Lab 10/20/24  1603 10/21/24  0147 10/22/24  0409 10/23/24  0337 10/24/24  0304 10/25/24  0304   * 154* 153* 152* 153* 150*   K 5.1 3.7 3.7 3.3* 3.6 3.6   CALCIUM 8.4* 8.2* 8.5* 8.1* 7.2* 7.7*   MG 2.60 2.40 2.40 2.40 2.30 2.20   * 125* 124* 124* 123* 116*   CO2 19* 20* 21* 18* 19* 19*   BUN 45.3* 31.7* 28.1* 23.2 16.7 15.3   CREATININE 1.48* 1.01 0.82 0.82 0.71* 0.70*   EGFRNORACEVR 51 >60 >60 >60 >60 >60   GLUCOSE 157* 121* 83 84 71* 71*   BILITOT 0.8 0.6 0.5 0.7 0.7 0.6   ALKPHOS 105 90 94 94 94 104   ALT 17 14 10 8 8 11   AST 18 13 12 13 12 21   ALBUMIN 2.6* 2.1* 1.9* 1.8* 1.8* 2.0*   WBC 16.52  16.52* 12.69  12.69* 12.61* 9.83 11.61* 12.27*   HGB 11.5*  "8.6* 8.7* 8.2* 7.9* 9.2*   HCT 37.1* 28.2* 30.5* 29.0* 26.3* 30.6*     Nutrition Orders:  Diet NPO  Tube Feedings/Formulas 70; 1,400; Impact Peptide 1.5; Peg; 100; Every 2 hours,Tube Feedings/Formulas Other (see comments); Peg; Patel - Orange    Appetite/Oral Intake: not applicable/not applicable  Factors Affecting Nutritional Intake: on mechanical ventilation and tracheostomy  Social Needs Impacting Access to Food: none identified (from NH)  Food/Taoist/Cultural Preferences: unable to obtain  Food Allergies: no known food allergies  Last Bowel Movement: 10/25/24  Wound(s):[REMOVED]      Altered Skin Integrity 24 1100 lower Buttocks Moisture associated dermatitis-Tissue loss description: Full thickness       Wound 24 1420 Pressure Injury Left Knee-Tissue loss description: Partial thickness       Wound 10/20/24 2100 Pressure Injury Sacral spine-Tissue loss description: Full thickness       Wound 24 1500 Pressure Injury Left lateral Ankle-Tissue loss description: Partial thickness     Comments    10/21/24: Pt with previous EMR wts indicating wt loss. On NH TF. Also with increased protein needs due to wounds.     10/25/24: TF on hold. Pt continues with vomiting. On Reglan.     Anthropometrics    Height: 5' 9.02" (175.3 cm), Height Method: Estimated  Last Weight: 69.1 kg (152 lb 5.4 oz) (10/21/24 1026), Weight Method: Bed Scale  BMI (Calculated): 22.5  BMI Classification: normal (BMI 18.5-24.9)        Ideal Body Weight (IBW), Male: 160.12 lb     % Ideal Body Weight, Male (lb): 95.21 %                 Usual Body Weight (UBW), k kg (per EMR from 24)  % Usual Body Weight: 76.94  % Weight Change From Usual Weight: -23.22 %  Usual Weight Provided By: EMR weight history    Wt Readings from Last 5 Encounters:   10/21/24 69.1 kg (152 lb 5.4 oz)   24 90.7 kg (199 lb 15.3 oz)   24 117.9 kg (260 lb)   24 117.9 kg (260 lb)   24 90.7 kg (200 lb)     Weight Change(s) Since " Admission:   Wt Readings from Last 1 Encounters:   10/21/24 1026 69.1 kg (152 lb 5.4 oz)   10/21/24 0634 69.1 kg (152 lb 5.4 oz)   10/20/24 1535 63.5 kg (140 lb)   Admit Weight: 63.5 kg (140 lb) (10/20/24 1535), Weight Method: Estimated    Estimated Needs    Weight Used For Calorie Calculations: 69.1 kg (152 lb 5.4 oz)  Energy Calorie Requirements (kcal): 1886kcal (1.3 stress factor)  Energy Need Method: Hughes-St Jeor  Weight Used For Protein Calculations: 69.1 kg (152 lb 5.4 oz)  Protein Requirements: 124-138gm (1.8-2g/kg)  Fluid Requirements (mL): 2073ml (30ml/kg)  CHO Requirement: 210gm (45% est kcal needs)     Enteral Nutrition     Patient not receiving enteral nutrition at this time.    Parenteral Nutrition     Patient not receiving parenteral nutrition support at this time.    Evaluation of Received Nutrient Intake    Calories: not meeting estimated needs  Protein: not meeting estimated needs    Patient Education     Not applicable.    Nutrition Diagnosis     PES: Inadequate oral intake related to chronic illness as evidenced by trach/PEG. (active)     PES: Severe chronic disease or condition related malnutrition related to chronic illness as evidenced by severe fat depletion, severe muscle depletion, and greater than 10% weight loss in 6 months. (active)    Nutrition Interventions     Intervention(s): collaboration with other providers    Goal: Meet greater than 80% of nutritional needs by follow-up. (goal progressing)  Goal: Tolerate enteral feeding at goal rate by follow-up. (goal progressing)    Nutrition Goals & Monitoring     Dietitian will monitor: energy intake and enteral nutrition intake  Discharge planning: too early to determine; pending clinical course  Nutrition Risk/Follow-Up: high (follow-up in 1-4 days)   Please consult if re-assessment needed sooner.

## 2024-10-25 NOTE — PROGRESS NOTES
Advance Care Planning     Date: 10/25/2024    Today a voluntary meeting took place: bedside    Patient Participation: Patient is unable to participate     Attendees (Name and  Relationship to patient):  5 out of 9 of patient's children. 2 present (Carmen and Beth), and three attended via telephone    Staff attendees (Name and  Role): Jovana AKINS RN-CHPN, Dr. Mckee, and MÓNICA Martinez    ACP Conversation (General): Understanding of current condition Education provided on stages of patient's decline and overall poor prognosis, explained in detail patient condition will continue to decline and hospitalizations would become more frequent and possibly more critical. We discussed aggressive care vs comfort focus and palliative vs hospice in detail.      Code Status: Full Code    ACP Documents: Provided ACP documents    Goals of care: The family endorses that what is most important right now is to focus on symptom/pain control, quality of life, even if it means sacrificing a little time, extending life as long as possible, even it it means sacrificing quality, and curative/life-prolongation (regardless of treatment burdens)    Accordingly, we have decided that the best plan to meet the patient's goals includes continuing with treatment      Recommendations/  Follow-up tasks: The patient and health care agent were provided the following recommendations to continue discussion on ideal quality of life and encouraged to continue to review code status and medical wishes amongst the patient's children frequently.         Hospice  I did explain the role for hospice care at this stage of the patient's illness, including its ability to help the patient live with the best quality of life possible.  We will notbe making a hospice referral. The patient's children verbalized understanding of benefits that could be provided, however states at this time they would like for patient to continue to be able to come to the hospital and be  admitted to ICU if need, despite patient's disease process and will continue to progress.

## 2024-10-25 NOTE — PLAN OF CARE
Problem: Skin Injury Risk Increased  Goal: Skin Health and Integrity  Outcome: Progressing     Problem: Adult Inpatient Plan of Care  Goal: Plan of Care Review  Outcome: Progressing  Goal: Patient-Specific Goal (Individualized)  Outcome: Progressing  Goal: Absence of Hospital-Acquired Illness or Injury  Outcome: Progressing  Goal: Optimal Comfort and Wellbeing  Outcome: Progressing  Goal: Readiness for Transition of Care  Outcome: Progressing     Problem: Infection  Goal: Absence of Infection Signs and Symptoms  Outcome: Progressing     Problem: Sepsis/Septic Shock  Goal: Optimal Coping  Outcome: Progressing  Goal: Absence of Bleeding  Outcome: Progressing  Goal: Blood Glucose Level Within Targeted Range  Outcome: Progressing  Goal: Absence of Infection Signs and Symptoms  Outcome: Progressing  Goal: Optimal Nutrition Intake  Outcome: Progressing     Problem: Pneumonia  Goal: Fluid Balance  Outcome: Progressing  Goal: Resolution of Infection Signs and Symptoms  Outcome: Progressing  Goal: Effective Oxygenation and Ventilation  Outcome: Progressing     Problem: Wound  Goal: Optimal Coping  Outcome: Progressing  Goal: Optimal Functional Ability  Outcome: Progressing  Goal: Absence of Infection Signs and Symptoms  Outcome: Progressing  Goal: Improved Oral Intake  Outcome: Progressing  Goal: Optimal Pain Control and Function  Outcome: Progressing  Goal: Skin Health and Integrity  Outcome: Progressing  Goal: Optimal Wound Healing  Outcome: Progressing     Problem: Fall Injury Risk  Goal: Absence of Fall and Fall-Related Injury  Outcome: Progressing     Problem: Coping Ineffective  Goal: Effective Coping  Outcome: Progressing

## 2024-10-25 NOTE — PROGRESS NOTES
[facility] - Emergency Dept  Pulmonary Critical Care Note    Patient Name: Delio Daniel Jr.  MRN: [id number]  Admission Date: 10/20/2024  Hospital Length of Stay: 5 days  Code Status: Full Code  Attending Provider: Itz Francisco MD  Primary Care Provider: Jl Briones MD     Subjective:     HPI:   Patient is a 68 y/o male with extensive PMH who presented from NH on 10/20/24 with decreased responsiveness, severe sepsis, and recurrent UTI. 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     Patient transferred to ED from [facility] with lethargy and tachycardia. Family at bedside reports patient is nonverbal at baseline but typically more alert. In the ED, patient is febrile, tachycardic with -190s, tachypneic, /60. EKG shows Afib with RVR. Diltiazem drip started in ED. Labs are significant for WBC of 16.5, lactic acid of 3.3, Cr 1.48, BUN 45.3, Na 155, K+ 5.1, troponin elevated at 0.118. UA with evidence of UTI. Of note, pt was being treated outpatient for a UTI, currently on day 4 of 7 day Rocephin course. Urine culture 10/16/24 with multidrug resistant Klebsiella pneumonia and Proteus mirabilis with susceptibility to Cefepime. Patient received 3L of NS and initiated on Vanc and Cefepime in ED. Admitted to the ICU on mechanical ventilation via trach.      Hospital Course/Significant events:  10/20/24: Admitted to ICU for severe sepsis     24 Hour Interval History:  Decreased urine output overnight. Afebrile.  cc. Given 500 cc bolus of LR. WBC 12 from 11. Na 150 from 153. Normal renal function. H 9.2 from 7.9.       Past Medical History:   Diagnosis Date    Arthritis      Atrial fibrillation     BPH (benign prostatic hyperplasia)     Cardiac arrest     Coronary artery disease     Cyst, kidney, acquired     Diverticulosis     Hyperlipidemia     Hypertension     MI (myocardial infarction)     Obesity     Steatosis of liver     Stroke        Past Surgical History:   Procedure Laterality Date    A-V CARDIAC PACEMAKER INSERTION Right     CARDIAC CATHETERIZATION      COLONOSCOPY W/ BIOPSIES      CRANIECTOMY Right 12/20/2023    Procedure: CRANIECTOMY;  Surgeon: Artem Can MD;  Location: [facility] OR;  Service: Neurosurgery;  Laterality: Right;    ESOPHAGOGASTRODUODENOSCOPY W/ PEG N/A 1/2/2024    Procedure: PEG;  Surgeon: Tani Day MD;  Location: [facility] ENDOSCOPY;  Service: Gastroenterology;  Laterality: N/A;    excision of colon      TRACHEOSTOMY N/A 12/29/2023    Procedure: CREATION, TRACHEOSTOMY;  Surgeon: Patricia Winslow MD;  Location: [facility] OR;  Service: ENT;  Laterality: N/A;  REQ 1130 //  NEEDS 2 SCRUBS       Social History     Socioeconomic History    Marital status:     Number of children: 9   Occupational History    Occupation: retired   Tobacco Use    Smoking status: Never    Smokeless tobacco: Never   Substance and Sexual Activity    Alcohol use: Not Currently    Drug use: Not Currently    Sexual activity: Not Currently     Partners: Female     Social Drivers of Health     Financial Resource Strain: Patient Unable To Answer (10/21/2024)    Overall Financial Resource Strain (CARDIA)     Difficulty of Paying Living Expenses: Patient unable to answer   Food Insecurity: Patient Unable To Answer (10/21/2024)    Hunger Vital Sign     Worried About Running Out of Food in the Last Year: Patient unable to answer     Ran Out of Food in the Last Year: Patient unable to answer   Transportation Needs: Patient Unable To Answer (10/21/2024)    TRANSPORTATION NEEDS     Transportation : Patient unable to answer   Physical Activity: Sufficiently Active (8/5/2024)    Exercise  Vital Sign     Days of Exercise per Week: 5 days     Minutes of Exercise per Session: 30 min   Stress: Patient Unable To Answer (10/21/2024)    [employer] - Occupational Stress Questionnaire     Feeling of Stress : Patient unable to answer   Housing Stability: Patient Unable To Answer (10/21/2024)    Housing Stability Vital Sign     Unable to Pay for Housing in the Last Year: Patient unable to answer     Homeless in the Last Year: Patient unable to answer           Current Outpatient Medications   Medication Instructions    ascorbic Acid (VITAMIN C) 500 mg, 2 times daily, Per PEG tube    busPIRone (BUSPAR) 5 mg, Per G Tube, 3 times daily    cholestyramine (QUESTRAN) 4 gram packet 4 g, Daily, Via PEG tube    cholestyramine-aspartame (QUESTRAN LIGHT) 4 gram PwPk 4 g, Per G Tube, 2 times daily    diltiaZEM (CARDIZEM) 60 mg, Per G Tube, Every 6 hours    ferrous sulfate 300 mg, Oral, Daily    finasteride (PROSCAR) 5 mg, Oral, Daily    furosemide (LASIX) 80 mg, Per G Tube, As needed (PRN)    L. acidophilus/L.bulgaricus (FLORANEX ORAL) 1 packet, PEG Tube, Daily    levetiracetam 1,500 mg, Per G Tube, 2 times daily, Nursing home reports medication hold by MD until level redraw on Monday.    LIPITOR 10 mg, Per G Tube, Daily    metoclopramide HCl (REGLAN) 10 mg, Per G Tube, 3 times daily before meals    metoprolol tartrate (LOPRESSOR) 100 mg, Per G Tube, 2 times daily    miconazole NITRATE 2 % (MICOTIN) 2 % top powder Topical (Top), 2 times daily    multivitamin (THERAGRAN) per tablet 1 tablet, Per G Tube, Daily    pantoprazole (PROTONIX) 40 mg, Intravenous, 2 times daily    polyethylene glycol (GLYCOLAX) 17 g, Oral, 2 times daily PRN    protein supplement (PROMOD PROTEIN ORAL) 30 mLs, Per G Tube, Daily    QUEtiapine (SEROQUEL) 25 mg, Per G Tube, 2 times daily    scopolamine (TRANSDERM-SCOP) 1.3-1.5 mg (1 mg over 3 days) 1 patch, Transdermal, Every 3 days    sucralfate (CARAFATE) 1 g, Per G Tube,  Before meals & nightly    tamsulosin (FLOMAX) 0.4 mg, Oral, Nightly    venlafaxine 75 mg TR24 1 tablet, Per G Tube, 2 times daily    XARELTO 20 mg Tab 1 tablet, Per G Tube, Nightly       Current Inpatient Medications   atorvastatin  10 mg Per G Tube Daily    lactated ringers  500 mL Intravenous Once    linezolid  600 mg Intravenous Q12H    meropenem IV (PEDS and ADULTS)  2 g Intravenous Q8H    metoclopramide HCl  10 mg Oral TID AC    metoprolol tartrate  25 mg Per G Tube TID    mupirocin   Nasal BID    pantoprazole  40 mg Intravenous Daily    QUEtiapine  25 mg Per G Tube BID    rivaroxaban  20 mg Per G Tube Nightly    scopolamine  1 patch Transdermal Q3 Days       Current Intravenous Infusions          Review of Systems   Unable to perform ROS: Mental status change          Objective:       Intake/Output Summary (Last 24 hours) at 10/25/2024 0337  Last data filed at 10/25/2024 0000  Gross per 24 hour   Intake 1872.67 ml   Output 540 ml   Net 1332.67 ml         Vital Signs (Most Recent):  Temp: 99 °F (37.2 °C) (10/25/24 0000)  Pulse: 92 (10/25/24 0300)  Resp: 20 (10/25/24 0300)  BP: 115/82 (10/25/24 0300)  SpO2: 100 % (10/25/24 0300)  Body mass index is 22.49 kg/m².  Weight: 69.1 kg (152 lb 5.4 oz) Vital Signs (24h Range):  Temp:  [98.3 °F (36.8 °C)-99 °F (37.2 °C)] 99 °F (37.2 °C)  Pulse:  [] 92  Resp:  [12-29] 20  SpO2:  [79 %-100 %] 100 %  BP: ()/(65-89) 115/82     Physical Exam  Constitutional:       General: He is not in acute distress.     Appearance: He is ill-appearing.      Comments: Thin, Chronically ill-appearing   HENT:      Mouth/Throat:      Mouth: Mucous membranes are dry.   Cardiovascular:      Rate and Rhythm: Tachycardia present. Rhythm irregular.   Abdominal:      General: There is no distension.      Palpations: Abdomen is soft.      Comments: PEG tube in place    Musculoskeletal:      Comments: LUE and LLE in contracture    Skin:     General: Skin is warm and dry.      Comments: Large  sacral pressure ulcer. BLE with scattered superficial abrasions.    Neurological:      Comments: Non-sedated. Nonverbal at baseline. No spontaneous eye opening. Corneal reflex intact. Does not follow commands. Winces and withdraws to painful stimuli in RUE and RLE.           Lines/Drains/Airways       Drain  Duration                  Gastrostomy/Enterostomy 01/02/24 1230  days         Urethral Catheter 10/20/24 2210 Silicone 16 Fr. 4 days              Airway  Duration             Adult Surgical Airway 08/19/24 0120 Shiley Extra Large Cuffed Distal 6.0/ 75mm 67 days              Peripheral Intravenous Line  Duration                  Midline Catheter - Single Lumen 10/20/24 1530 Right 4 days         Peripheral IV - Single Lumen 10/20/24 1600 18 G Anterior;Distal;Left Upper Arm 4 days                    Significant Labs:    Lab Results   Component Value Date    WBC 12.27 (H) 10/25/2024    HGB 9.2 (L) 10/25/2024    HCT 30.6 (L) 10/25/2024    MCV 87.2 10/25/2024     10/25/2024           BMP  Lab Results   Component Value Date     (H) 10/24/2024    K 3.6 10/24/2024    CO2 19 (L) 10/24/2024    BUN 16.7 10/24/2024    CREATININE 0.71 (L) 10/24/2024    CALCIUM 7.2 (L) 10/24/2024    AGAP 11.0 10/24/2024    EGFRNONAA 61 04/23/2022         ABG  Recent Labs   Lab 10/21/24  0625   PH 7.460*   PO2 63.0*   PCO2 35.0   HCO3 24.9   POCBASEDEF 1.30       Mechanical Ventilation Support:  Vent Mode: A/C (10/25/24 0156)  Ventilator Initiated: Yes (10/20/24 1546)  Set Rate: 20 BPM (10/25/24 0156)  Vt Set: 500 mL (10/25/24 0156)  PEEP/CPAP: 5 cmH20 (10/25/24 0156)  Oxygen Concentration (%): 30 (10/25/24 0156)  Peak Airway Pressure: 27 cmH20 (10/25/24 0156)  Total Ve: 8.5 L/m (10/25/24 0156)  F/VT Ratio<105 (RSBI): (!) 50 (10/25/24 0156)    Significant Imaging:  I have reviewed the pertinent imaging within the past 24 hours.      CT Abdomen Pelvis With IV Contrast NO Oral Contrast  Result Date: 10/21/2024  Interval  development of sacral decubitus just to the right of the superior aspect of the vertebral fold.  No abscess or fluid collection is seen Findings seen consistent with urinary bladder wall thickening and inflammatory changes consistent with cystitis Interval development of bilateral patchy areas of pneumonia with developing consolidation in the lower lobes Electronically signed by: Erick Grant Date:    10/21/2024 Time:    17:22      Assessment/Plan:     Assessment  Sepsis  VRE Enterococcus and ESBL Klebsiella bacteremia  Klebsiella pneumoniae and Proteus mirabilis pneumonia  UTI  Afib with RVR  Hypernatremia   Sacral wound      Plan  Admitted to ICU   On mechanical ventilation via trach   CIS following, patient received digoxin on 10/21/2024., started lopressor 25 mg TID  Echocardiogram with no evidence of endocarditis.  Blood cultures on 10/22/2024 no growth at 48 hours  Urine culture with no growth to date, blood and respiratory cultures as above  ID following, discontinued cefepime and dapto on 10/23, continue merrem (10/23-present) and linezolid (10/21-present)  Wound care and General surgery consulted for pressure ulcer of the sacrum, recommend nutritional optimization and continued wound care  Palliative care consulted    DVT Prophylaxis: SCDs, continue home Xarelto   GI Prophylaxis: PPI     32 minutes of critical care was time spent personally by me on the following activities: development of treatment plan with patient or surrogate and bedside caregivers, discussions with consultants, evaluation of patient's response to treatment, examination of patient, ordering and performing treatments and interventions, ordering and review of laboratory studies, ordering and review of radiographic studies, pulse oximetry, re-evaluation of patient's condition.  This critical care time did not overlap with that of any other provider or involve time for any procedures.     Amina Dolan MD  Pulmonary Critical Care  Medicine  [facility]  DOS: 10/25/2024

## 2024-10-25 NOTE — PROGRESS NOTES
Infectious Disease  Progress Note    Patient Name: Delio Daniel Jr.   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 On admission, noted to be hypotensive with Afib and RVR, recently had positive urine culture with Klebsiella and Proteus. He also has an advanced sacral ulcer. He was noted to have bacteremia with 2/4 of admission blood cultures being positive for Enterococcus faecium and ultimately noting it is VRE per BCID. ID consulted for assistance in management.      Bacteremia  VRE bacteremia  Klebsiella bacteremia  CVA with residual cognitive deficits, motor deficits  Tracheostomy and PEG tube dependent  Sacral ulcer   Recurrent UTI  Pacemaker In place  History of V fib arrest     10/25 - afebrile. GOC ongoing. Blood is clear for 72 hrs, continue current regimen for #14 days, ID will sign off. If any changes in plan please call back     Recommendations:  - likely source is respiratory vs decubitus  - no plan to debride pending GOC with family   - currently on meropenem 10/23 to 11/6  - currently on linezolid 10/22 to 11/5  - Ongoing goals of care conversation, will need a course of at least 2 weeks of antibiotics as outlined . ID will sign off.        Dicussed with patient    Raina Brennan MD, MPH  [facility]       Thank you for your consult. ID will  continue following. Please contact us with any questions or concerns.  Subjective:     Principal Problem: UTI (urinary tract infection)     Interval History:   Generally unchanged, non engaging with evaluation    Review of Systems   Review of Systems   Unable to perform ROS: Medical condition   Constitutional:  Negative for chills, diaphoresis, fever and weight loss.   HENT:  Negative for congestion and nosebleeds.    Respiratory:  Negative for cough, sputum production and wheezing.    Cardiovascular:  Negative for chest pain, palpitations and leg swelling.   Gastrointestinal:  Negative for abdominal pain, diarrhea and vomiting.   Genitourinary:  Negative for dysuria, flank pain and frequency.   Musculoskeletal:  Negative for back pain, joint pain and neck pain.   Skin:  Negative for itching and rash.   Neurological:  Negative for sensory change, seizures, weakness and headaches.   Endo/Heme/Allergies:  Does not bruise/bleed easily.        Objective:     Vital Signs (Most Recent):  Temp: 98 °F (36.7 °C) (10/25/24 0800)  Pulse: 97 (10/25/24 1021)  Resp: (!) 21 (10/25/24 1021)  BP: 114/76 (10/25/24 1000)  SpO2: 100 % (10/25/24 1021)  Vital Signs (24h Range):  Temp:  [98 °F (36.7 °C)-99 °F (37.2 °C)] 98 °F (36.7 °C)  Pulse:  [] 97  Resp:  [12-29] 21  SpO2:  [79 %-100 %] 100 %  BP: ()/(69-89) 114/76      Weight:   Wt Readings from Last 1 Encounters:   10/21/24 69.1 kg (152 lb 5.4 oz)      Body mass index is Body mass index is 22.49 kg/m².     Estimated Creatinine Clearance: Estimated Creatinine Clearance: 98.7 mL/min (A) (based on SCr of 0.7 mg/dL (L)).     Lines/Drains/Airways       Drain  Duration                  Gastrostomy/Enterostomy 01/02/24 1230  days         Urethral Catheter 10/20/24 2210 Silicone 16 Fr. 4 days              Airway  Duration             Adult Surgical Airway 08/19/24 0120 Shiley Extra Large Cuffed Distal 6.0/ 75mm 67 days              Peripheral Intravenous Line  Duration                   Midline Catheter - Single Lumen 10/20/24 1530 Right 4 days         Peripheral IV - Single Lumen 10/20/24 1600 18 G Anterior;Distal;Left Upper Arm 4 days                     Physical Exam  Physical Exam  Constitutional:       Appearance: He is not ill-appearing (Chronically ill-appearing).      Comments: interacting   HENT:      Head: Normocephalic and atraumatic.      Mouth/Throat:      Pharynx: No oropharyngeal exudate or posterior oropharyngeal erythema.   Eyes:      Extraocular Movements: Extraocular movements intact.      Pupils: Pupils are equal, round, and reactive to light.   Cardiovascular:      Rate and Rhythm: Normal rate and regular rhythm.      Heart sounds: No murmur heard.  Pulmonary:      Effort: No respiratory distress.      Breath sounds: No wheezing, rhonchi or rales.      Comments: Tracheostomy in place, ON VENT  Abdominal:      General: Bowel sounds are normal. There is no distension.      Palpations: Abdomen is soft.      Tenderness: There is no abdominal tenderness.      Comments: Peg tube in place   Genitourinary:     Comments: Fernandez   Musculoskeletal:         General: No swelling or tenderness.      Cervical back: Neck supple. No rigidity or tenderness.      Comments: Deep sacral ulcer, evaluated with the wound care physician today, slough, tunneling, foul-smelling, contaminated with feces   Lymphadenopathy:      Cervical: No cervical adenopathy.   Skin:     Findings: No lesion or rash.   Neurological:      Mental Status: He is alert.      Comments: At baseline, nonverbal, interaction is minimal but alert and responsive                                                                 Significant Labs: CBC:   Recent Labs   Lab 10/24/24  0304 10/25/24  0304   WBC 11.61* 12.27*   HGB 7.9* 9.2*   HCT 26.3* 30.6*    211     CMP:   Recent Labs   Lab 10/24/24  0304 10/25/24  0304   * 150*   K 3.6 3.6   * 116*   CO2 19* 19*   BUN 16.7 15.3   CREATININE 0.71* 0.70*    CALCIUM 7.2* 7.7*   ALBUMIN 1.8* 2.0*   BILITOT 0.7 0.6   ALKPHOS 94 104   AST 12 21   ALT 8 11       Microbiology Results (last 7 days)       Procedure Component Value Units Date/Time    Blood Culture [[id number]]  (Normal) Collected: 10/22/24 0758    Order Status: Completed Specimen: Blood from Hand, Right Updated: 10/25/24 0901     Blood Culture No Growth At 72 Hours    Blood Culture [[id number]]  (Normal) Collected: 10/22/24 0758    Order Status: Completed Specimen: Blood from Hand, Left Updated: 10/25/24 0901     Blood Culture No Growth At 72 Hours    Blood culture #1 **CANNOT BE ORDERED STAT** [[id number]]  (Normal) Collected: 10/20/24 1603    Order Status: Completed Specimen: Blood Updated: 10/24/24 1701     Blood Culture No Growth At 96 Hours    Respiratory Culture [[id number]]  (Abnormal)  (Susceptibility) Collected: 10/20/24 2234    Order Status: Completed Specimen: Sputum Updated: 10/24/24 1313     Respiratory Culture Many Klebsiella pneumoniae esbl     Comment: with normal respiratory niyah         Moderate Proteus mirabilis     GRAM STAIN Quality 3+      Moderate Gram Negative Rods      Many Gram Positive Rods      Rare Yeast    Blood culture #2 **CANNOT BE ORDERED STAT** [[id number]]  (Abnormal)  (Susceptibility) Collected: 10/20/24 1603    Order Status: Completed Specimen: Blood Updated: 10/23/24 0702     Blood Culture Enterococcus faecium VRE      Klebsiella pneumoniae esbl     GRAM STAIN Gram Positive Cocci, probable Streptococcus      Seen in gram stain of broth only      2 of 2 bottles positive    BCID2 Panel [[id number]]  (Abnormal) Collected: 10/20/24 1603    Order Status: Completed Specimen: Blood Updated: 10/23/24 0636     CTX-M (ESBL ) N/A     IMP (Cabapenemase ) N/A     KPC resistance gene (Carbapenemase ) N/A     mcr-1 N/A     mecA ID N/A     Comment: Note: Antimicrobial resistance can occur via multiple mechanisms. A Not Detected result for antimicrobial  resistance gene(s) does not indicate antimicrobial susceptibility. Subculturing is required for species identification and susceptibility testing of   isolates.        mecA/C and MREJ (MRSA) gene N/A     NDM (Carbapenemase ) N/A     OXA-48-like (Carbapenemase ) N/A     Sofi/B (VRE gene) Detected     VIM (Carbapenemase ) N/A     Enterococcus faecalis Not Detected     Enterococcus faecium Detected     Listeria monocytogenes Not Detected     Staphylococcus spp. Not Detected     Staphylococcus aureus Not Detected     Staphylococcus epidermidis Not Detected     Staphylococcus lugdunensis Not Detected     Streptococcus spp. Not Detected     Streptococcus agalactiae (Group B) Not Detected     Streptococcus pneumoniae Not Detected     Streptococcus pyogenes (Group A) Not Detected     Acinetobacter calcoaceticus/baumannii complex Not Detected     Bacteroides fragilis Not Detected     Enterobacterales Not Detected     Enterobacter cloacae complex Not Detected     Escherichia coli Not Detected     Klebsiella aerogenes Not Detected     Klebsiella oxytoca Not Detected     Klebsiella pneumoniae group Detected     Comment: This is a corrected result. Previous result was Not Detected on 10/21/2024 at 1146 CDT.        Proteus spp. Not Detected     Salmonella spp. Not Detected     Serratia marcescens Not Detected     Haemophilus influenzae Not Detected     Neisseria meningitidis Not Detected     Pseudomonas aeruginosa Not Detected     Stenotrophomonas maltophilia Not Detected     Candida albicans Not Detected     Candida auris Not Detected     Candida glabrata Not Detected     Candida krusei Not Detected     Candida parapsilosis Not Detected     Candida tropicalis Not Detected     Cryptococcus neoformans/gattii Not Detected    Narrative:      The [address] BCID2 Panel is a multiplexed nucleic acid test intended for the use with [employer]® 2.0 or [employer]® [employer] for the simultaneous  qualitative detection and identification of multiple bacterial and yeast nucleic acids and select genetic determinants associated with antimicrobial resistance.  The [employer] BCID2 Panel test is performed directly on blood culture samples identified as positive by a continuous monitoring blood culture system.  Results are intended to be interpreted in conjunction with Gram stain results.    Urine culture [[id number]] Collected: 10/20/24 1654    Order Status: Completed Specimen: Urine Updated: 10/22/24 0725     Urine Culture No Growth             Significant Imaging: I have reviewed all pertinent imaging results/findings within the past 24 hours.      Kenneth Bhardwaj MD  Infectious Disease  [facility]

## 2024-10-26 LAB
ABS NEUT (OLG): 8.63 X10(3)/MCL (ref 2.1–9.2)
ALBUMIN SERPL-MCNC: 2 G/DL (ref 3.4–4.8)
ALBUMIN SERPL-MCNC: 2.1 G/DL (ref 3.4–4.8)
ALBUMIN/GLOB SERPL: 0.5 RATIO (ref 1.1–2)
ALBUMIN/GLOB SERPL: 0.5 RATIO (ref 1.1–2)
ALP SERPL-CCNC: 100 UNIT/L (ref 40–150)
ALP SERPL-CCNC: 102 UNIT/L (ref 40–150)
ALT SERPL-CCNC: 12 UNIT/L (ref 0–55)
ALT SERPL-CCNC: 13 UNIT/L (ref 0–55)
ANION GAP SERPL CALC-SCNC: 12 MEQ/L
ANION GAP SERPL CALC-SCNC: 9 MEQ/L
AST SERPL-CCNC: 16 UNIT/L (ref 5–34)
AST SERPL-CCNC: 18 UNIT/L (ref 5–34)
BILIRUB SERPL-MCNC: 0.7 MG/DL
BILIRUB SERPL-MCNC: 0.8 MG/DL
BUN SERPL-MCNC: 11.7 MG/DL (ref 8.4–25.7)
BUN SERPL-MCNC: 13.6 MG/DL (ref 8.4–25.7)
BURR CELLS (OLG): ABNORMAL
CALCIUM SERPL-MCNC: 7.8 MG/DL (ref 8.8–10)
CALCIUM SERPL-MCNC: 8 MG/DL (ref 8.8–10)
CHLORIDE SERPL-SCNC: 109 MMOL/L (ref 98–107)
CHLORIDE SERPL-SCNC: 112 MMOL/L (ref 98–107)
CO2 SERPL-SCNC: 23 MMOL/L (ref 23–31)
CO2 SERPL-SCNC: 25 MMOL/L (ref 23–31)
CREAT SERPL-MCNC: 0.57 MG/DL (ref 0.72–1.25)
CREAT SERPL-MCNC: 0.58 MG/DL (ref 0.72–1.25)
CREAT/UREA NIT SERPL: 20
CREAT/UREA NIT SERPL: 24
EOSINOPHIL NFR BLD MANUAL: 0.43 X10(3)/MCL (ref 0–0.9)
EOSINOPHIL NFR BLD MANUAL: 3 %
ERYTHROCYTE [DISTWIDTH] IN BLOOD BY AUTOMATED COUNT: 16.4 % (ref 11.5–17)
GFR SERPLBLD CREATININE-BSD FMLA CKD-EPI: >60 ML/MIN/1.73/M2
GFR SERPLBLD CREATININE-BSD FMLA CKD-EPI: >60 ML/MIN/1.73/M2
GLOBULIN SER-MCNC: 3.9 GM/DL (ref 2.4–3.5)
GLOBULIN SER-MCNC: 4.3 GM/DL (ref 2.4–3.5)
GLUCOSE SERPL-MCNC: 105 MG/DL (ref 82–115)
GLUCOSE SERPL-MCNC: 48 MG/DL (ref 82–115)
HCT VFR BLD AUTO: 32.4 % (ref 42–52)
HGB BLD-MCNC: 9.7 G/DL (ref 14–18)
INSTRUMENT WBC (OLG): 14.39 X10(3)/MCL
LYMPHOCYTES NFR BLD MANUAL: 1.73 X10(3)/MCL
LYMPHOCYTES NFR BLD MANUAL: 12 %
MAGNESIUM SERPL-MCNC: 2 MG/DL (ref 1.6–2.6)
MCH RBC QN AUTO: 25.9 PG (ref 27–31)
MCHC RBC AUTO-ENTMCNC: 29.9 G/DL (ref 33–36)
MCV RBC AUTO: 86.6 FL (ref 80–94)
MONOCYTES NFR BLD MANUAL: 25 %
MONOCYTES NFR BLD MANUAL: 3.6 X10(3)/MCL (ref 0.1–1.3)
NEUTROPHILS NFR BLD MANUAL: 60 %
NRBC BLD AUTO-RTO: 0.2 %
OVALOCYTES (OLG): ABNORMAL
PLATELET # BLD AUTO: 221 X10(3)/MCL (ref 130–400)
PLATELET # BLD EST: NORMAL 10*3/UL
PMV BLD AUTO: 11.1 FL (ref 7.4–10.4)
POCT GLUCOSE: 26 MG/DL (ref 70–110)
POCT GLUCOSE: 40 MG/DL (ref 70–110)
POCT GLUCOSE: 55 MG/DL (ref 70–110)
POCT GLUCOSE: 93 MG/DL (ref 70–110)
POIKILOCYTOSIS BLD QL SMEAR: ABNORMAL
POTASSIUM SERPL-SCNC: 3 MMOL/L (ref 3.5–5.1)
POTASSIUM SERPL-SCNC: 3.5 MMOL/L (ref 3.5–5.1)
PROT SERPL-MCNC: 6 GM/DL (ref 5.8–7.6)
PROT SERPL-MCNC: 6.3 GM/DL (ref 5.8–7.6)
RBC # BLD AUTO: 3.74 X10(6)/MCL (ref 4.7–6.1)
RBC MORPH BLD: ABNORMAL
SODIUM SERPL-SCNC: 143 MMOL/L (ref 136–145)
SODIUM SERPL-SCNC: 147 MMOL/L (ref 136–145)
WBC # BLD AUTO: 14.39 X10(3)/MCL (ref 4.5–11.5)

## 2024-10-26 PROCEDURE — 99900031 HC PATIENT EDUCATION (STAT)

## 2024-10-26 PROCEDURE — 27200966 HC CLOSED SUCTION SYSTEM

## 2024-10-26 PROCEDURE — 20000000 HC ICU ROOM

## 2024-10-26 PROCEDURE — 63600175 PHARM REV CODE 636 W HCPCS

## 2024-10-26 PROCEDURE — 99900026 HC AIRWAY MAINTENANCE (STAT)

## 2024-10-26 PROCEDURE — 25000003 PHARM REV CODE 250: Performed by: NURSE PRACTITIONER

## 2024-10-26 PROCEDURE — 94003 VENT MGMT INPAT SUBQ DAY: CPT

## 2024-10-26 PROCEDURE — 99900035 HC TECH TIME PER 15 MIN (STAT)

## 2024-10-26 PROCEDURE — 99900022

## 2024-10-26 PROCEDURE — 25000003 PHARM REV CODE 250

## 2024-10-26 PROCEDURE — 85025 COMPLETE CBC W/AUTO DIFF WBC: CPT

## 2024-10-26 PROCEDURE — 25000003 PHARM REV CODE 250: Performed by: GENERAL PRACTICE

## 2024-10-26 PROCEDURE — 27100171 HC OXYGEN HIGH FLOW UP TO 24 HOURS

## 2024-10-26 PROCEDURE — 94761 N-INVAS EAR/PLS OXIMETRY MLT: CPT

## 2024-10-26 PROCEDURE — 63600175 PHARM REV CODE 636 W HCPCS: Performed by: GENERAL PRACTICE

## 2024-10-26 PROCEDURE — 36415 COLL VENOUS BLD VENIPUNCTURE: CPT

## 2024-10-26 PROCEDURE — 80053 COMPREHEN METABOLIC PANEL: CPT

## 2024-10-26 PROCEDURE — 27000207 HC ISOLATION

## 2024-10-26 PROCEDURE — 94760 N-INVAS EAR/PLS OXIMETRY 1: CPT

## 2024-10-26 PROCEDURE — 80053 COMPREHEN METABOLIC PANEL: CPT | Performed by: HOSPITALIST

## 2024-10-26 PROCEDURE — 83735 ASSAY OF MAGNESIUM: CPT

## 2024-10-26 RX ADMIN — SODIUM CHLORIDE 2 G: 9 INJECTION, SOLUTION INTRAVENOUS at 02:10

## 2024-10-26 RX ADMIN — GUAIFENESIN AND DEXTROMETHORPHAN 5 ML: 100; 10 SYRUP ORAL at 08:10

## 2024-10-26 RX ADMIN — RIVAROXABAN 20 MG: 10 TABLET, FILM COATED ORAL at 08:10

## 2024-10-26 RX ADMIN — METOCLOPRAMIDE HYDROCHLORIDE 10 MG: 5 SOLUTION ORAL at 10:10

## 2024-10-26 RX ADMIN — GUAIFENESIN AND DEXTROMETHORPHAN 5 ML: 100; 10 SYRUP ORAL at 06:10

## 2024-10-26 RX ADMIN — MUPIROCIN: 20 OINTMENT TOPICAL at 08:10

## 2024-10-26 RX ADMIN — QUETIAPINE FUMARATE 25 MG: 25 TABLET ORAL at 08:10

## 2024-10-26 RX ADMIN — DEXTROSE MONOHYDRATE 250 ML: 100 INJECTION, SOLUTION INTRAVENOUS at 07:10

## 2024-10-26 RX ADMIN — GUAIFENESIN AND DEXTROMETHORPHAN 5 ML: 100; 10 SYRUP ORAL at 02:10

## 2024-10-26 RX ADMIN — LINEZOLID 600 MG: 600 INJECTION, SOLUTION INTRAVENOUS at 08:10

## 2024-10-26 RX ADMIN — PANTOPRAZOLE SODIUM 40 MG: 40 INJECTION, POWDER, LYOPHILIZED, FOR SOLUTION INTRAVENOUS at 08:10

## 2024-10-26 RX ADMIN — METOCLOPRAMIDE HYDROCHLORIDE 10 MG: 5 SOLUTION ORAL at 05:10

## 2024-10-26 RX ADMIN — DEXTROSE MONOHYDRATE 250 ML: 100 INJECTION, SOLUTION INTRAVENOUS at 10:10

## 2024-10-26 RX ADMIN — LINEZOLID 600 MG: 600 INJECTION, SOLUTION INTRAVENOUS at 09:10

## 2024-10-26 RX ADMIN — METOPROLOL TARTRATE 25 MG: 25 TABLET, FILM COATED ORAL at 04:10

## 2024-10-26 RX ADMIN — SODIUM CHLORIDE 2 G: 9 INJECTION, SOLUTION INTRAVENOUS at 09:10

## 2024-10-26 RX ADMIN — SODIUM CHLORIDE 2 G: 9 INJECTION, SOLUTION INTRAVENOUS at 07:10

## 2024-10-26 RX ADMIN — METOCLOPRAMIDE HYDROCHLORIDE 10 MG: 5 SOLUTION ORAL at 04:10

## 2024-10-26 RX ADMIN — METOPROLOL TARTRATE 25 MG: 25 TABLET, FILM COATED ORAL at 09:10

## 2024-10-26 RX ADMIN — ATORVASTATIN CALCIUM 10 MG: 10 TABLET, FILM COATED ORAL at 08:10

## 2024-10-26 RX ADMIN — METOPROLOL TARTRATE 25 MG: 25 TABLET, FILM COATED ORAL at 08:10

## 2024-10-26 NOTE — PROGRESS NOTES
[facility] - Emergency Dept  Pulmonary Critical Care Note    Patient Name: Delio Daniel Jr.  MRN: [id number]  Admission Date: 10/20/2024  Hospital Length of Stay: 6 days  Code Status: Full Code  Attending Provider: Itz Francisco MD  Primary Care Provider: Jl Briones MD     Subjective:     HPI:   Patient is a 70 y/o male with extensive PMH who presented from NH on 10/20/24 with decreased responsiveness, severe sepsis, and recurrent UTI. 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     Patient transferred to ED from [facility] with lethargy and tachycardia. Family at bedside reports patient is nonverbal at baseline but typically more alert. In the ED, patient is febrile, tachycardic with -190s, tachypneic, /60. EKG shows Afib with RVR. Diltiazem drip started in ED. Labs are significant for WBC of 16.5, lactic acid of 3.3, Cr 1.48, BUN 45.3, Na 155, K+ 5.1, troponin elevated at 0.118. UA with evidence of UTI. Of note, pt was being treated outpatient for a UTI, currently on day 4 of 7 day Rocephin course. Urine culture 10/16/24 with multidrug resistant Klebsiella pneumonia and Proteus mirabilis with susceptibility to Cefepime. Patient received 3L of NS and initiated on Vanc and Cefepime in ED. Admitted to the ICU on mechanical ventilation via trach.      Hospital Course/Significant events:  10/20/24: Admitted to ICU for severe sepsis     24 Hour Interval History:  Afebrile. UOP 1150 cc over 24 hours. WBC 14 from 12. Hgb stable. CMP pending.       Past Medical History:   Diagnosis Date    Arthritis     Atrial fibrillation     BPH (benign prostatic hyperplasia)     Cardiac arrest      Coronary artery disease     Cyst, kidney, acquired     Diverticulosis     Hyperlipidemia     Hypertension     MI (myocardial infarction)     Obesity     Steatosis of liver     Stroke        Past Surgical History:   Procedure Laterality Date    A-V CARDIAC PACEMAKER INSERTION Right     CARDIAC CATHETERIZATION      COLONOSCOPY W/ BIOPSIES      CRANIECTOMY Right 12/20/2023    Procedure: CRANIECTOMY;  Surgeon: Artem Can MD;  Location: [facility];  Service: Neurosurgery;  Laterality: Right;    ESOPHAGOGASTRODUODENOSCOPY W/ PEG N/A 1/2/2024    Procedure: PEG;  Surgeon: Tani Day MD;  Location: [facility] ENDOSCOPY;  Service: Gastroenterology;  Laterality: N/A;    excision of colon      TRACHEOSTOMY N/A 12/29/2023    Procedure: CREATION, TRACHEOSTOMY;  Surgeon: Patricia Winslow MD;  Location: [facility];  Service: ENT;  Laterality: N/A;  REQ 1130 //  NEEDS 2 SCRUBS       Social History     Socioeconomic History    Marital status:     Number of children: 9   Occupational History    Occupation: retired   Tobacco Use    Smoking status: Never    Smokeless tobacco: Never   Substance and Sexual Activity    Alcohol use: Not Currently    Drug use: Not Currently    Sexual activity: Not Currently     Partners: Female     Social Drivers of Health     Financial Resource Strain: Patient Unable To Answer (10/21/2024)    Overall Financial Resource Strain (CARDIA)     Difficulty of Paying Living Expenses: Patient unable to answer   Food Insecurity: Patient Unable To Answer (10/21/2024)    Hunger Vital Sign     Worried About Running Out of Food in the Last Year: Patient unable to answer     Ran Out of Food in the Last Year: Patient unable to answer   Transportation Needs: Patient Unable To Answer (10/21/2024)    TRANSPORTATION NEEDS     Transportation : Patient unable to answer   Physical Activity: Sufficiently Active (8/5/2024)    Exercise Vital Sign     Days of Exercise per Week: 5 days     Minutes of Exercise per  Session: 30 min   Stress: Patient Unable To Answer (10/21/2024)    [employer] - Occupational Stress Questionnaire     Feeling of Stress : Patient unable to answer   Housing Stability: Patient Unable To Answer (10/21/2024)    Housing Stability Vital Sign     Unable to Pay for Housing in the Last Year: Patient unable to answer     Homeless in the Last Year: Patient unable to answer           Current Outpatient Medications   Medication Instructions    ascorbic Acid (VITAMIN C) 500 mg, 2 times daily, Per PEG tube    busPIRone (BUSPAR) 5 mg, Per G Tube, 3 times daily    cholestyramine (QUESTRAN) 4 gram packet 4 g, Daily, Via PEG tube    cholestyramine-aspartame (QUESTRAN LIGHT) 4 gram PwPk 4 g, Per G Tube, 2 times daily    diltiaZEM (CARDIZEM) 60 mg, Per G Tube, Every 6 hours    ferrous sulfate 300 mg, Oral, Daily    finasteride (PROSCAR) 5 mg, Oral, Daily    furosemide (LASIX) 80 mg, Per G Tube, As needed (PRN)    L. acidophilus/L.bulgaricus (FLORANEX ORAL) 1 packet, PEG Tube, Daily    levetiracetam 1,500 mg, Per G Tube, 2 times daily, Nursing home reports medication hold by MD until level redraw on Monday.    LIPITOR 10 mg, Per G Tube, Daily    metoclopramide HCl (REGLAN) 10 mg, Per G Tube, 3 times daily before meals    metoprolol tartrate (LOPRESSOR) 100 mg, Per G Tube, 2 times daily    miconazole NITRATE 2 % (MICOTIN) 2 % top powder Topical (Top), 2 times daily    multivitamin (THERAGRAN) per tablet 1 tablet, Per G Tube, Daily    pantoprazole (PROTONIX) 40 mg, Intravenous, 2 times daily    polyethylene glycol (GLYCOLAX) 17 g, Oral, 2 times daily PRN    protein supplement (PROMOD PROTEIN ORAL) 30 mLs, Per G Tube, Daily    QUEtiapine (SEROQUEL) 25 mg, Per G Tube, 2 times daily    scopolamine (TRANSDERM-SCOP) 1.3-1.5 mg (1 mg over 3 days) 1 patch, Transdermal, Every 3 days    sucralfate (CARAFATE) 1 g, Per G Tube, Before meals & nightly    tamsulosin (FLOMAX) 0.4 mg, Oral, Nightly     venlafaxine 75 mg TR24 1 tablet, Per G Tube, 2 times daily    XARELTO 20 mg Tab 1 tablet, Per G Tube, Nightly       Current Inpatient Medications   atorvastatin  10 mg Per G Tube Daily    linezolid  600 mg Intravenous Q12H    meropenem IV (PEDS and ADULTS)  2 g Intravenous Q8H    metoclopramide HCl  10 mg Oral TID AC    metoprolol tartrate  25 mg Per G Tube TID    mupirocin   Nasal BID    pantoprazole  40 mg Intravenous Daily    QUEtiapine  25 mg Per G Tube BID    rivaroxaban  20 mg Per G Tube Nightly    scopolamine  1 patch Transdermal Q3 Days       Current Intravenous Infusions          Review of Systems   Unable to perform ROS: Mental status change          Objective:       Intake/Output Summary (Last 24 hours) at 10/26/2024 0631  Last data filed at 10/26/2024 0529  Gross per 24 hour   Intake 50 ml   Output 1200 ml   Net -1150 ml         Vital Signs (Most Recent):  Temp: 98.7 °F (37.1 °C) (10/26/24 0400)  Pulse: 86 (10/26/24 0600)  Resp: 20 (10/26/24 0600)  BP: (!) 128/95 (10/26/24 0600)  SpO2: 100 % (10/26/24 0600)  Body mass index is 22.49 kg/m².  Weight: 69.1 kg (152 lb 5.4 oz) Vital Signs (24h Range):  Temp:  [98 °F (36.7 °C)-99.4 °F (37.4 °C)] 98.7 °F (37.1 °C)  Pulse:  [] 86  Resp:  [11-27] 20  SpO2:  [92 %-100 %] 100 %  BP: ()/(65-98) 128/95     Physical Exam  Constitutional:       General: He is not in acute distress.     Appearance: He is ill-appearing.      Comments: Thin, Chronically ill-appearing   HENT:      Mouth/Throat:      Mouth: Mucous membranes are dry.   Cardiovascular:      Rate and Rhythm: Tachycardia present. Rhythm irregular.   Abdominal:      General: There is no distension.      Palpations: Abdomen is soft.      Comments: PEG tube in place    Musculoskeletal:      Comments: LUE and LLE in contracture    Skin:     General: Skin is warm and dry.      Comments: Large sacral pressure ulcer. BLE with scattered superficial abrasions.    Neurological:      Comments: Non-sedated.  Nonverbal at baseline. No spontaneous eye opening. Corneal reflex intact. Does not follow commands. Winces and withdraws to painful stimuli in RUE and RLE.           Lines/Drains/Airways       Drain  Duration                  Gastrostomy/Enterostomy 01/02/24 1230  days         Urethral Catheter 10/20/24 2210 Silicone 16 Fr. 5 days         Rectal Tube 10/25/24 2030 <1 day              Airway  Duration             Adult Surgical Airway 08/19/24 0120 Shiley Extra Large Cuffed Distal 6.0/ 75mm 68 days              Peripheral Intravenous Line  Duration                  Midline Catheter - Single Lumen 10/20/24 1530 Right 5 days         Peripheral IV - Single Lumen 10/20/24 1600 18 G Anterior;Distal;Left Upper Arm 5 days                    Significant Labs:    Lab Results   Component Value Date    WBC 14.39 (H) 10/26/2024    WBC 14.39 10/26/2024    HGB 9.7 (L) 10/26/2024    HCT 32.4 (L) 10/26/2024    MCV 86.6 10/26/2024     10/26/2024           BMP  Lab Results   Component Value Date     (H) 10/25/2024    K 3.6 10/25/2024    CO2 19 (L) 10/25/2024    BUN 15.3 10/25/2024    CREATININE 0.70 (L) 10/25/2024    CALCIUM 7.7 (L) 10/25/2024    AGAP 15.0 10/25/2024    EGFRNONAA 61 04/23/2022         ABG  Recent Labs   Lab 10/21/24  0625   PH 7.460*   PO2 63.0*   PCO2 35.0   HCO3 24.9   POCBASEDEF 1.30       Mechanical Ventilation Support:  Vent Mode: A/C (10/26/24 0514)  Ventilator Initiated: Yes (10/20/24 1546)  Set Rate: 20 BPM (10/26/24 0514)  Vt Set: 500 mL (10/26/24 0514)  PEEP/CPAP: 5 cmH20 (10/26/24 0514)  Oxygen Concentration (%): 30 (10/26/24 0514)  Peak Airway Pressure: 19 cmH20 (10/26/24 0514)  Total Ve: 7.3 L/m (10/26/24 0514)  F/VT Ratio<105 (RSBI): (!) 54.35 (10/26/24 0514)    Significant Imaging:  I have reviewed the pertinent imaging within the past 24 hours.    CT Abdomen Pelvis With IV Contrast NO Oral Contrast  Result Date: 10/21/2024  Interval development of sacral decubitus just to the right  of the superior aspect of the vertebral fold.  No abscess or fluid collection is seen Findings seen consistent with urinary bladder wall thickening and inflammatory changes consistent with cystitis Interval development of bilateral patchy areas of pneumonia with developing consolidation in the lower lobes Electronically signed by: Erick Grant Date:    10/21/2024 Time:    17:22      Assessment/Plan:     Assessment  Sepsis  VRE Enterococcus and ESBL Klebsiella bacteremia  Klebsiella pneumoniae and Proteus mirabilis pneumonia  UTI  Afib with RVR  Hypernatremia   Sacral wound      Plan  Admitted to ICU   On mechanical ventilation via trach   CIS following, patient received digoxin on 10/21/2024., started lopressor 25 mg TID  Echocardiogram with no evidence of endocarditis.  Blood cultures on 10/22/2024 no growth at 72 hours  Urine culture with no growth to date, blood and respiratory cultures as above  ID following, discontinued cefepime and dapto on 10/23, continue merrem (10/23-11/6) and linezolid (10/22-11/5)  Wound care and General surgery consulted for pressure ulcer of the sacrum, recommend nutritional optimization and continued wound care  Palliative care consulted    DVT Prophylaxis: SCDs, continue home Xarelto   GI Prophylaxis: PPI     32 minutes of critical care was time spent personally by me on the following activities: development of treatment plan with patient or surrogate and bedside caregivers, discussions with consultants, evaluation of patient's response to treatment, examination of patient, ordering and performing treatments and interventions, ordering and review of laboratory studies, ordering and review of radiographic studies, pulse oximetry, re-evaluation of patient's condition.  This critical care time did not overlap with that of any other provider or involve time for any procedures.     Amina Dolan MD  Pulmonary Critical Care Medicine  [facility] - Emergency Dept  DOS:  10/26/2024

## 2024-10-27 LAB
ALBUMIN SERPL-MCNC: 2.2 G/DL (ref 3.4–4.8)
ALBUMIN/GLOB SERPL: 0.6 RATIO (ref 1.1–2)
ALP SERPL-CCNC: 109 UNIT/L (ref 40–150)
ALT SERPL-CCNC: 12 UNIT/L (ref 0–55)
ANION GAP SERPL CALC-SCNC: 16 MEQ/L
AST SERPL-CCNC: 19 UNIT/L (ref 5–34)
BACTERIA BLD CULT: NORMAL
BACTERIA BLD CULT: NORMAL
BASOPHILS # BLD AUTO: 0.04 X10(3)/MCL
BASOPHILS NFR BLD AUTO: 0.5 %
BILIRUB SERPL-MCNC: 0.9 MG/DL
BUN SERPL-MCNC: 14.3 MG/DL (ref 8.4–25.7)
CALCIUM SERPL-MCNC: 8 MG/DL (ref 8.8–10)
CHLORIDE SERPL-SCNC: 108 MMOL/L (ref 98–107)
CO2 SERPL-SCNC: 20 MMOL/L (ref 23–31)
CREAT SERPL-MCNC: 0.59 MG/DL (ref 0.72–1.25)
CREAT/UREA NIT SERPL: 24
EOSINOPHIL # BLD AUTO: 0.17 X10(3)/MCL (ref 0–0.9)
EOSINOPHIL NFR BLD AUTO: 2.1 %
ERYTHROCYTE [DISTWIDTH] IN BLOOD BY AUTOMATED COUNT: 16.2 % (ref 11.5–17)
GFR SERPLBLD CREATININE-BSD FMLA CKD-EPI: >60 ML/MIN/1.73/M2
GLOBULIN SER-MCNC: 4 GM/DL (ref 2.4–3.5)
GLUCOSE SERPL-MCNC: 35 MG/DL (ref 82–115)
HCT VFR BLD AUTO: 30.4 % (ref 42–52)
HGB BLD-MCNC: 9.2 G/DL (ref 14–18)
IMM GRANULOCYTES # BLD AUTO: 0.16 X10(3)/MCL (ref 0–0.04)
IMM GRANULOCYTES NFR BLD AUTO: 1.9 %
LYMPHOCYTES # BLD AUTO: 2.09 X10(3)/MCL (ref 0.6–4.6)
LYMPHOCYTES NFR BLD AUTO: 25.5 %
MAGNESIUM SERPL-MCNC: 1.9 MG/DL (ref 1.6–2.6)
MCH RBC QN AUTO: 25.8 PG (ref 27–31)
MCHC RBC AUTO-ENTMCNC: 30.3 G/DL (ref 33–36)
MCV RBC AUTO: 85.4 FL (ref 80–94)
MONOCYTES # BLD AUTO: 1.31 X10(3)/MCL (ref 0.1–1.3)
MONOCYTES NFR BLD AUTO: 16 %
NEUTROPHILS # BLD AUTO: 4.44 X10(3)/MCL (ref 2.1–9.2)
NEUTROPHILS NFR BLD AUTO: 54 %
NRBC BLD AUTO-RTO: 0 %
PLATELET # BLD AUTO: 263 X10(3)/MCL (ref 130–400)
PMV BLD AUTO: 11.2 FL (ref 7.4–10.4)
POCT GLUCOSE: 71 MG/DL (ref 70–110)
POCT GLUCOSE: 72 MG/DL (ref 70–110)
POCT GLUCOSE: 85 MG/DL (ref 70–110)
POCT GLUCOSE: 92 MG/DL (ref 70–110)
POTASSIUM SERPL-SCNC: 3.5 MMOL/L (ref 3.5–5.1)
PROT SERPL-MCNC: 6.2 GM/DL (ref 5.8–7.6)
RBC # BLD AUTO: 3.56 X10(6)/MCL (ref 4.7–6.1)
SODIUM SERPL-SCNC: 144 MMOL/L (ref 136–145)
WBC # BLD AUTO: 8.21 X10(3)/MCL (ref 4.5–11.5)

## 2024-10-27 PROCEDURE — 20000000 HC ICU ROOM

## 2024-10-27 PROCEDURE — 99900022

## 2024-10-27 PROCEDURE — 25000003 PHARM REV CODE 250: Performed by: NURSE PRACTITIONER

## 2024-10-27 PROCEDURE — 25000003 PHARM REV CODE 250

## 2024-10-27 PROCEDURE — 63600175 PHARM REV CODE 636 W HCPCS: Performed by: GENERAL PRACTICE

## 2024-10-27 PROCEDURE — 94760 N-INVAS EAR/PLS OXIMETRY 1: CPT

## 2024-10-27 PROCEDURE — 99900031 HC PATIENT EDUCATION (STAT)

## 2024-10-27 PROCEDURE — 83735 ASSAY OF MAGNESIUM: CPT

## 2024-10-27 PROCEDURE — 63600175 PHARM REV CODE 636 W HCPCS

## 2024-10-27 PROCEDURE — 25000003 PHARM REV CODE 250: Performed by: GENERAL PRACTICE

## 2024-10-27 PROCEDURE — 99900035 HC TECH TIME PER 15 MIN (STAT)

## 2024-10-27 PROCEDURE — 99900026 HC AIRWAY MAINTENANCE (STAT)

## 2024-10-27 PROCEDURE — 36415 COLL VENOUS BLD VENIPUNCTURE: CPT

## 2024-10-27 PROCEDURE — 27000207 HC ISOLATION

## 2024-10-27 PROCEDURE — 94003 VENT MGMT INPAT SUBQ DAY: CPT

## 2024-10-27 PROCEDURE — 27100171 HC OXYGEN HIGH FLOW UP TO 24 HOURS

## 2024-10-27 PROCEDURE — 85025 COMPLETE CBC W/AUTO DIFF WBC: CPT

## 2024-10-27 PROCEDURE — 80053 COMPREHEN METABOLIC PANEL: CPT

## 2024-10-27 RX ORDER — QUETIAPINE FUMARATE 25 MG/1
50 TABLET, FILM COATED ORAL 2 TIMES DAILY
Status: DISCONTINUED | OUTPATIENT
Start: 2024-10-27 | End: 2024-11-18 | Stop reason: HOSPADM

## 2024-10-27 RX ORDER — GUAIFENESIN AND DEXTROMETHORPHAN HYDROBROMIDE 10; 100 MG/5ML; MG/5ML
10 SYRUP ORAL EVERY 4 HOURS PRN
Status: DISCONTINUED | OUTPATIENT
Start: 2024-10-27 | End: 2024-11-18 | Stop reason: HOSPADM

## 2024-10-27 RX ADMIN — GUAIFENESIN AND DEXTROMETHORPHAN 10 ML: 100; 10 SYRUP ORAL at 08:10

## 2024-10-27 RX ADMIN — PANTOPRAZOLE SODIUM 40 MG: 40 INJECTION, POWDER, LYOPHILIZED, FOR SOLUTION INTRAVENOUS at 08:10

## 2024-10-27 RX ADMIN — LINEZOLID 600 MG: 600 INJECTION, SOLUTION INTRAVENOUS at 08:10

## 2024-10-27 RX ADMIN — METOPROLOL TARTRATE 25 MG: 25 TABLET, FILM COATED ORAL at 03:10

## 2024-10-27 RX ADMIN — POTASSIUM BICARBONATE 20 MEQ: 391 TABLET, EFFERVESCENT ORAL at 08:10

## 2024-10-27 RX ADMIN — GUAIFENESIN AND DEXTROMETHORPHAN 5 ML: 100; 10 SYRUP ORAL at 06:10

## 2024-10-27 RX ADMIN — RIVAROXABAN 20 MG: 10 TABLET, FILM COATED ORAL at 08:10

## 2024-10-27 RX ADMIN — ACETAMINOPHEN 650 MG: 325 TABLET ORAL at 08:10

## 2024-10-27 RX ADMIN — QUETIAPINE FUMARATE 25 MG: 25 TABLET ORAL at 08:10

## 2024-10-27 RX ADMIN — METOCLOPRAMIDE HYDROCHLORIDE 10 MG: 5 SOLUTION ORAL at 06:10

## 2024-10-27 RX ADMIN — GUAIFENESIN AND DEXTROMETHORPHAN 5 ML: 100; 10 SYRUP ORAL at 04:10

## 2024-10-27 RX ADMIN — SODIUM CHLORIDE 2 G: 9 INJECTION, SOLUTION INTRAVENOUS at 02:10

## 2024-10-27 RX ADMIN — METOPROLOL TARTRATE 25 MG: 25 TABLET, FILM COATED ORAL at 08:10

## 2024-10-27 RX ADMIN — ATORVASTATIN CALCIUM 10 MG: 10 TABLET, FILM COATED ORAL at 08:10

## 2024-10-27 RX ADMIN — METOCLOPRAMIDE HYDROCHLORIDE 10 MG: 5 SOLUTION ORAL at 04:10

## 2024-10-27 RX ADMIN — QUETIAPINE FUMARATE 50 MG: 25 TABLET ORAL at 08:10

## 2024-10-27 RX ADMIN — GUAIFENESIN AND DEXTROMETHORPHAN 5 ML: 100; 10 SYRUP ORAL at 11:10

## 2024-10-27 RX ADMIN — SODIUM CHLORIDE 2 G: 9 INJECTION, SOLUTION INTRAVENOUS at 10:10

## 2024-10-27 RX ADMIN — METOCLOPRAMIDE HYDROCHLORIDE 10 MG: 5 SOLUTION ORAL at 10:10

## 2024-10-27 RX ADMIN — GUAIFENESIN AND DEXTROMETHORPHAN 5 ML: 100; 10 SYRUP ORAL at 01:10

## 2024-10-27 RX ADMIN — SODIUM CHLORIDE 2 G: 9 INJECTION, SOLUTION INTRAVENOUS at 06:10

## 2024-10-27 NOTE — PROGRESS NOTES
[facility] - Emergency Dept  Pulmonary Critical Care Note    Patient Name: Delio Daniel Jr.  MRN: [id number]  Admission Date: 10/20/2024  Hospital Length of Stay: 7 days  Code Status: Full Code  Attending Provider: Itz Francisco MD  Primary Care Provider: Jl Briones MD     Subjective:     HPI:   Patient is a 68 y/o male with extensive PMH who presented from NH on 10/20/24 with decreased responsiveness, severe sepsis, and recurrent UTI. 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     Patient transferred to ED from [facility] with lethargy and tachycardia. Family at bedside reports patient is nonverbal at baseline but typically more alert. In the ED, patient is febrile, tachycardic with -190s, tachypneic, /60. EKG shows Afib with RVR. Diltiazem drip started in ED. Labs are significant for WBC of 16.5, lactic acid of 3.3, Cr 1.48, BUN 45.3, Na 155, K+ 5.1, troponin elevated at 0.118. UA with evidence of UTI. Of note, pt was being treated outpatient for a UTI, currently on day 4 of 7 day Rocephin course. Urine culture 10/16/24 with multidrug resistant Klebsiella pneumonia and Proteus mirabilis with susceptibility to Cefepime. Patient received 3L of NS and initiated on Vanc and Cefepime in ED. Admitted to the ICU on mechanical ventilation via trach.    Hospital Course/Significant events:  10/20/24: Admitted to ICU for severe sepsis     24 Hour Interval History:  No acute events overnight.  Leukocytosis down to 8 today.  Hypoglycemic at 35 this morning.  Borderline hypokalemic as well.      Past Medical History:   Diagnosis Date    Arthritis     Atrial fibrillation     BPH (benign  prostatic hyperplasia)     Cardiac arrest     Coronary artery disease     Cyst, kidney, acquired     Diverticulosis     Hyperlipidemia     Hypertension     MI (myocardial infarction)     Obesity     Steatosis of liver     Stroke        Past Surgical History:   Procedure Laterality Date    A-V CARDIAC PACEMAKER INSERTION Right     CARDIAC CATHETERIZATION      COLONOSCOPY W/ BIOPSIES      CRANIECTOMY Right 12/20/2023    Procedure: CRANIECTOMY;  Surgeon: Artem Can MD;  Location: [facility] OR;  Service: Neurosurgery;  Laterality: Right;    ESOPHAGOGASTRODUODENOSCOPY W/ PEG N/A 1/2/2024    Procedure: PEG;  Surgeon: Tani Day MD;  Location: [facility] ENDOSCOPY;  Service: Gastroenterology;  Laterality: N/A;    excision of colon      TRACHEOSTOMY N/A 12/29/2023    Procedure: CREATION, TRACHEOSTOMY;  Surgeon: Patricia Winslow MD;  Location: [facility] OR;  Service: ENT;  Laterality: N/A;  REQ 1130 //  NEEDS 2 SCRUBS       Social History     Socioeconomic History    Marital status:     Number of children: 9   Occupational History    Occupation: retired   Tobacco Use    Smoking status: Never    Smokeless tobacco: Never   Substance and Sexual Activity    Alcohol use: Not Currently    Drug use: Not Currently    Sexual activity: Not Currently     Partners: Female     Social Drivers of Health     Financial Resource Strain: Patient Unable To Answer (10/21/2024)    Overall Financial Resource Strain (CARDIA)     Difficulty of Paying Living Expenses: Patient unable to answer   Food Insecurity: Patient Unable To Answer (10/21/2024)    Hunger Vital Sign     Worried About Running Out of Food in the Last Year: Patient unable to answer     Ran Out of Food in the Last Year: Patient unable to answer   Transportation Needs: Patient Unable To Answer (10/21/2024)    TRANSPORTATION NEEDS     Transportation : Patient unable to answer   Physical Activity: Sufficiently Active (8/5/2024)    Exercise Vital Sign     Days of Exercise per  Week: 5 days     Minutes of Exercise per Session: 30 min   Stress: Patient Unable To Answer (10/21/2024)    [employer] - Occupational Stress Questionnaire     Feeling of Stress : Patient unable to answer   Housing Stability: Patient Unable To Answer (10/21/2024)    Housing Stability Vital Sign     Unable to Pay for Housing in the Last Year: Patient unable to answer     Homeless in the Last Year: Patient unable to answer       Current Outpatient Medications   Medication Instructions    ascorbic Acid (VITAMIN C) 500 mg, 2 times daily, Per PEG tube    busPIRone (BUSPAR) 5 mg, Per G Tube, 3 times daily    cholestyramine (QUESTRAN) 4 gram packet 4 g, Daily, Via PEG tube    cholestyramine-aspartame (QUESTRAN LIGHT) 4 gram PwPk 4 g, Per G Tube, 2 times daily    diltiaZEM (CARDIZEM) 60 mg, Per G Tube, Every 6 hours    ferrous sulfate 300 mg, Oral, Daily    finasteride (PROSCAR) 5 mg, Oral, Daily    furosemide (LASIX) 80 mg, Per G Tube, As needed (PRN)    L. acidophilus/L.bulgaricus (FLORANEX ORAL) 1 packet, PEG Tube, Daily    levetiracetam 1,500 mg, Per G Tube, 2 times daily, Nursing home reports medication hold by MD until level redraw on Monday.    LIPITOR 10 mg, Per G Tube, Daily    metoclopramide HCl (REGLAN) 10 mg, Per G Tube, 3 times daily before meals    metoprolol tartrate (LOPRESSOR) 100 mg, Per G Tube, 2 times daily    miconazole NITRATE 2 % (MICOTIN) 2 % top powder Topical (Top), 2 times daily    multivitamin (THERAGRAN) per tablet 1 tablet, Per G Tube, Daily    pantoprazole (PROTONIX) 40 mg, Intravenous, 2 times daily    polyethylene glycol (GLYCOLAX) 17 g, Oral, 2 times daily PRN    protein supplement (PROMOD PROTEIN ORAL) 30 mLs, Per G Tube, Daily    QUEtiapine (SEROQUEL) 25 mg, Per G Tube, 2 times daily    scopolamine (TRANSDERM-SCOP) 1.3-1.5 mg (1 mg over 3 days) 1 patch, Transdermal, Every 3 days    sucralfate (CARAFATE) 1 g, Per G Tube, Before meals & nightly    tamsulosin  (FLOMAX) 0.4 mg, Oral, Nightly    venlafaxine 75 mg TR24 1 tablet, Per G Tube, 2 times daily    XARELTO 20 mg Tab 1 tablet, Per G Tube, Nightly       Current Inpatient Medications   atorvastatin  10 mg Per G Tube Daily    linezolid  600 mg Intravenous Q12H    meropenem IV (PEDS and ADULTS)  2 g Intravenous Q8H    metoclopramide HCl  10 mg Oral TID AC    metoprolol tartrate  25 mg Per G Tube TID    pantoprazole  40 mg Intravenous Daily    QUEtiapine  25 mg Per G Tube BID    rivaroxaban  20 mg Per G Tube Nightly    scopolamine  1 patch Transdermal Q3 Days       Current Intravenous Infusions    Review of Systems   Unable to perform ROS: Mental status change        Objective:       Intake/Output Summary (Last 24 hours) at 10/27/2024 0652  Last data filed at 10/27/2024 0636  Gross per 24 hour   Intake 3126.93 ml   Output 2070 ml   Net 1056.93 ml         Vital Signs (Most Recent):  Temp: 99 °F (37.2 °C) (10/27/24 0400)  Pulse: 93 (10/27/24 0606)  Resp: (!) 26 (10/27/24 0606)  BP: 109/80 (10/27/24 0600)  SpO2: 99 % (10/27/24 0606)  Body mass index is 22.49 kg/m².  Weight: 69.1 kg (152 lb 5.4 oz) Vital Signs (24h Range):  Temp:  [97.8 °F (36.6 °C)-99 °F (37.2 °C)] 99 °F (37.2 °C)  Pulse:  [] 93  Resp:  [16-28] 26  SpO2:  [94 %-100 %] 99 %  BP: ()/(68-91) 109/80     Physical Exam  Constitutional:       General: He is not in acute distress.     Appearance: He is ill-appearing.      Comments: Thin, Chronically ill-appearing   HENT:      Mouth/Throat:      Mouth: Mucous membranes are dry.   Cardiovascular:      Rate and Rhythm: Tachycardia present. Rhythm irregular.   Abdominal:      General: There is no distension.      Palpations: Abdomen is soft.      Comments: PEG tube in place    Musculoskeletal:      Comments: LUE and LLE in contracture    Skin:     General: Skin is warm and dry.      Comments: Large sacral pressure ulcer. BLE with scattered superficial abrasions.    Neurological:      Comments: Non-sedated.  Nonverbal at baseline. No spontaneous eye opening. Corneal reflex intact. Does not follow commands. Winces and withdraws to painful stimuli in RUE and RLE.       Lines/Drains/Airways       Drain  Duration                  Gastrostomy/Enterostomy 01/02/24 1230  days         Urethral Catheter 10/20/24 2210 Silicone 16 Fr. 6 days         Rectal Tube 10/25/24 2030 1 day              Airway  Duration             Adult Surgical Airway 08/19/24 0120 Shiley Extra Large Cuffed Distal 6.0/ 75mm 69 days              Peripheral Intravenous Line  Duration                  Midline Catheter - Single Lumen 10/20/24 1530 Right 6 days         Peripheral IV - Single Lumen 10/20/24 1600 18 G Anterior;Distal;Left Upper Arm 6 days                    Significant Labs:    Lab Results   Component Value Date    WBC 8.21 10/27/2024    HGB 9.2 (L) 10/27/2024    HCT 30.4 (L) 10/27/2024    MCV 85.4 10/27/2024     10/27/2024           BMP  Lab Results   Component Value Date     10/27/2024    K 3.5 10/27/2024    CO2 20 (L) 10/27/2024    BUN 14.3 10/27/2024    CREATININE 0.59 (L) 10/27/2024    CALCIUM 8.0 (L) 10/27/2024    AGAP 16.0 10/27/2024    EGFRNONAA 61 04/23/2022         ABG  Recent Labs   Lab 10/21/24  0625   PH 7.460*   PO2 63.0*   PCO2 35.0   HCO3 24.9   POCBASEDEF 1.30       Mechanical Ventilation Support:  Vent Mode: A/C (10/27/24 0606)  Ventilator Initiated: Yes (10/20/24 1546)  Set Rate: 20 BPM (10/27/24 0606)  Vt Set: 500 mL (10/27/24 0606)  PEEP/CPAP: 5 cmH20 (10/27/24 0606)  Oxygen Concentration (%): 30 (10/27/24 0606)  Peak Airway Pressure: 17 cmH20 (10/27/24 0606)  Total Ve: 9.2 L/m (10/27/24 0606)  F/VT Ratio<105 (RSBI): (!) 73.24 (10/27/24 0606)    Significant Imaging:  I have reviewed the pertinent imaging within the past 24 hours.    CT Abdomen Pelvis With IV Contrast NO Oral Contrast  Result Date: 10/21/2024  Interval development of sacral decubitus just to the right of the superior aspect of the  vertebral fold.  No abscess or fluid collection is seen Findings seen consistent with urinary bladder wall thickening and inflammatory changes consistent with cystitis Interval development of bilateral patchy areas of pneumonia with developing consolidation in the lower lobes Electronically signed by: Erick Grant Date:    10/21/2024 Time:    17:22      Assessment/Plan:     Assessment  Sepsis  VRE Enterococcus and ESBL Klebsiella bacteremia  Klebsiella pneumoniae and Proteus mirabilis pneumonia  UTI  Afib with RVR  Hypernatremia   Sacral wound    Plan  Admitted to ICU   On mechanical ventilation via trach   CIS following, patient received digoxin on 10/21/2024. Continue lopressor 25 mg TID  Echocardiogram with no evidence of endocarditis.  Blood cultures on 10/22/2024 no growth at 96 hours  Urine culture with no growth to date, blood and respiratory cultures as above  ID following, discontinued cefepime and dapto on 10/23, continue merrem (10/23-11/6) and linezolid (10/22-11/5)  Wound care and General surgery consulted for pressure ulcer of the sacrum, recommend nutritional optimization and continued wound care  Palliative care consulted    DVT Prophylaxis: SCDs, continue home Xarelto   GI Prophylaxis: PPI     32 minutes of critical care was time spent personally by me on the following activities: development of treatment plan with patient or surrogate and bedside caregivers, discussions with consultants, evaluation of patient's response to treatment, examination of patient, ordering and performing treatments and interventions, ordering and review of laboratory studies, ordering and review of radiographic studies, pulse oximetry, re-evaluation of patient's condition.  This critical care time did not overlap with that of any other provider or involve time for any procedures.     Gasper eCballos DO  Pulmonary Critical Care Medicine  [facility] - Emergency Dept  DOS: 10/27/2024

## 2024-10-27 NOTE — CONSULTS
"Consult Note    Reason for Consult:      We were consulted by Dr. Barnett to evaluate this patient for tube feed intolerance.       HPI:     68 y.o. male known to Dr. Escalona from inpatient care (primary GI is Dr. 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      Patient presented to the ER 10/20/2024 from local nursing home with lethargy and tachycardia.  On arrival to the ER, patient afebrile and tachycardic with heart rate between 120 and 190, tachypneic in mildly hypotensive.  EKG demonstrates AFib with RVR.  Labs significant for WBC 16.5 lactic acid of 3.3, creatinine 1.48 BUN 45.3, Na 155, K 5.1, troponin elevated at 0.118, and UA with evidence of UTI.  Of note, patient was being treated outpatient for UTI with urine culture on 10/16/2024 positive for multidrug resistant Klebsiella pneumoniae and Proteus mirabilis with susceptibility to cefepime.  Patient was initiated on vancomycin and cefepime and admitted to the ICU on mechanical ventilation via trach.    History obtained via chart review and nurse report due to patient being nonverbal.  Patient reportedly having some regurgitation of PEG tube feedings and medications up through his mouth.  Tube feeds previously at 30 cc/hour and were turned off around 0930 this morning.  Goal is set for 75 cc/hour.  He is currently on Reglan 10 mg t.i.d..  Having liquid bowel movements via [facility].  GI has been consulted for tube feed intolerance.    Previous records reviewed...    Patient with multiple prior admissions during which our group was consulted for complaints of coffee-ground emesis and tube feed intolerance.  Most recent admission 08/19, GI consulted for brownish vomitus" which tested positive for occult blood.  At that " time, hemoglobin was stable inpatient had diagnosis of pneumonia.  No indication for GI intervention at that time and we subsequently signed off.  We were called back on 08/21 for tube feed intolerance.  At that time, residuals were not being checked and patient was reportedly vomiting tube feeds.  Admission on 08/03 with tracheobronchitis infectious versus inflammatory from aspiration, sepsis, acute hypoxic respiratory failure, AFib with RVR. CT A/P noted prominent distal esophageal wall thickening suggestive of esophagitis.      01/02/2024 EGD/PEG: relatively unremarkable exam, and a 24 FR peg was placed with external bumper at 4 cm.  Hx of anemia in the recent past with hgb as low as 9 but hgb around 12 within the last month.       PCP:  Jl Briones MD    Review of patient's allergies indicates:  No Known Allergies     Current Facility-Administered Medications   Medication Dose Route Frequency Provider Last Rate Last Admin    acetaminophen tablet 650 mg  650 mg Per G Tube Q6H PRN Kadie Rojas DO   650 mg at 10/27/24 0849    atorvastatin tablet 10 mg  10 mg Per G Tube Daily Kadie Rojas DO   10 mg at 10/27/24 0846    dextromethorphan-guaiFENesin  mg/5 ml liquid 5 mL  5 mL Per G Tube Q4H PRN Mark Mckee MD   5 mL at 10/27/24 0625    dextrose 10% bolus 125 mL 125 mL  12.5 g Intravenous PRN Kadie Rojas DO        dextrose 10% bolus 250 mL 250 mL  25 g Intravenous PRN Kadie Rojas DO   Stopped at 10/26/24 1113    glucagon (human recombinant) injection 1 mg  1 mg Intramuscular PRN Kadie Rojas DO        hydrALAZINE injection 10 mg  10 mg Intravenous Q4H PRN Kadie Rojas DO        linezolid 600 mg/300 mL IVPB 600 mg  600 mg Intravenous Q12H Kenneth Bhardwaj MD   Stopped at 10/27/24 0957    meropenem (MERREM) 2 g in 0.9% NaCl 100 mL IVPB  2 g Intravenous Q8H Kenneth Bhardwaj  mL/hr at 10/27/24 1036 2 g at 10/27/24 1036    metoclopramide HCl 5 mg/5 mL  solution 10 mg  10 mg Oral TID AC Katherin Kearney MD   10 mg at 10/27/24 1036    metoprolol injection 5 mg  5 mg Intravenous Q5 Min PRN Mark Mckee MD   5 mg at 10/21/24 0607    metoprolol tartrate (LOPRESSOR) tablet 25 mg  25 mg Per G Tube TID Leopoldo Mullins, FNP   25 mg at 10/27/24 0846    naloxone 0.4 mg/mL injection 0.02 mg  0.02 mg Intravenous PRN Kadie Rojas DO        pantoprazole injection 40 mg  40 mg Intravenous Daily Kadie Rojas DO   40 mg at 10/27/24 0846    QUEtiapine tablet 25 mg  25 mg Per G Tube BID Mark Mckee MD   25 mg at 10/27/24 0846    rivaroxaban tablet 20 mg  20 mg Per G Tube Nightly Kadie Rojas DO   20 mg at 10/26/24 2009    scopolamine 1.3-1.5 mg (1 mg over 3 days) 1 patch  1 patch Transdermal Q3 Days Katherin Kearney MD   1 patch at 10/25/24 2012    sodium chloride 0.9% flush 10 mL  10 mL Intravenous Q12H PRN Kadie Rojas DO         Medications Prior to Admission   Medication Sig Dispense Refill Last Dose/Taking    ascorbic Acid (VITAMIN C) 500 mg CpSR 500 mg 2 (two) times daily. Per PEG tube       busPIRone (BUSPAR) 5 MG Tab 5 mg by Per G Tube route 3 (three) times daily.       cholestyramine (QUESTRAN) 4 gram packet 4 g once daily. Via PEG tube       cholestyramine-aspartame (QUESTRAN LIGHT) 4 gram PwPk 1 packet (4 g total) by Per G Tube route 2 (two) times daily. 180 packet 3     diltiaZEM (CARDIZEM) 60 MG tablet 60 mg by Per G Tube route every 6 (six) hours.       ferrous sulfate 300 mg (60 mg iron)/5 mL syrup Take 5 mLs (300 mg total) by mouth once daily. 450 mL 0     finasteride (PROSCAR) 5 mg tablet Take 1 tablet (5 mg total) by mouth once daily. 30 tablet 11     furosemide (LASIX) 80 MG tablet 80 mg by Per G Tube route as needed (for Edema).       L. acidophilus/L.bulgaricus (FLORANEX ORAL) 1 packet by PEG Tube route Daily.       levetiracetam 500 mg/5 mL (5 mL) Soln 1,500 mg by Per G Tube route 2 (two) times daily.  Nursing home reports medication hold by MD until level redraw on Monday.       LIPITOR 10 mg tablet 10 mg by Per G Tube route once daily.       metoclopramide HCl (REGLAN) 5 mg/5 mL Soln 10 mg by Per G Tube route 3 (three) times daily before meals.       metoprolol tartrate (LOPRESSOR) 100 MG tablet 100 mg by Per G Tube route 2 (two) times daily.       miconazole NITRATE 2 % (MICOTIN) 2 % top powder Apply topically 2 (two) times daily.       multivitamin (THERAGRAN) per tablet 1 tablet by Per G Tube route once daily.       pantoprazole (PROTONIX) 40 mg injection Inject 40 mg into the vein 2 (two) times daily.       polyethylene glycol (GLYCOLAX) 17 gram PwPk Take 17 g by mouth 2 (two) times daily as needed for Constipation.       protein supplement (PROMOD PROTEIN ORAL) 30 mLs by Per G Tube route once daily.       QUEtiapine (SEROQUEL) 25 MG Tab 25 mg by Per G Tube route 2 (two) times daily.       scopolamine (TRANSDERM-SCOP) 1.3-1.5 mg (1 mg over 3 days) Place 1 patch onto the skin Every 3 (three) days.       sucralfate (CARAFATE) 1 gram tablet 1 tablet (1 g total) by Per G Tube route 4 (four) times daily before meals and nightly.       tamsulosin (FLOMAX) 0.4 mg Cap Take 1 capsule (0.4 mg total) by mouth every evening. 30 capsule 11     venlafaxine 75 mg TR24 1 tablet by Per G Tube route 2 (two) times a day.       XARELTO 20 mg Tab 1 tablet by Per G Tube route nightly.          Past Medical History:  Past Medical History:   Diagnosis Date    Arthritis     Atrial fibrillation     BPH (benign prostatic hyperplasia)     Cardiac arrest     Coronary artery disease     Cyst, kidney, acquired     Diverticulosis     Hyperlipidemia     Hypertension     MI (myocardial infarction)     Obesity     Steatosis of liver     Stroke       Past Surgical History:  Past Surgical History:   Procedure Laterality Date    A-V CARDIAC PACEMAKER INSERTION Right     CARDIAC CATHETERIZATION      COLONOSCOPY W/ BIOPSIES      CRANIECTOMY Right  12/20/2023    Procedure: CRANIECTOMY;  Surgeon: Artem Can MD;  Location: [facility] OR;  Service: Neurosurgery;  Laterality: Right;    ESOPHAGOGASTRODUODENOSCOPY W/ PEG N/A 1/2/2024    Procedure: PEG;  Surgeon: Tani Day MD;  Location: [facility] ENDOSCOPY;  Service: Gastroenterology;  Laterality: N/A;    excision of colon      TRACHEOSTOMY N/A 12/29/2023    Procedure: CREATION, TRACHEOSTOMY;  Surgeon: Patricia Winslow MD;  Location: [facility] OR;  Service: ENT;  Laterality: N/A;  REQ 1130 //  NEEDS 2 SCRUBS      Family History:  Family History   Problem Relation Name Age of Onset    Hypertension Mother      Hypertension Father      Hypertension Sister       Social History:  Social History     Tobacco Use    Smoking status: Never    Smokeless tobacco: Never   Substance Use Topics    Alcohol use: Not Currently       Review of Systems:     Review of Systems   Unable to perform ROS: Patient nonverbal       Objective:     VITAL SIGNS: 24 HR MIN & MAX LAST    Temp  Min: 97.8 °F (36.6 °C)  Max: 99.9 °F (37.7 °C)  98.4 °F (36.9 °C)        BP  Min: 101/76  Max: 134/84  110/76     Pulse  Min: 71  Max: 134  (!) 112     Resp  Min: 16  Max: 28  (!) 27    SpO2  Min: 94 %  Max: 100 %  98 %        Intake/Output Summary (Last 24 hours) at 10/27/2024 1044  Last data filed at 10/27/2024 0900  Gross per 24 hour   Intake 2576.93 ml   Output 1895 ml   Net 681.93 ml       Physical Exam  Constitutional:       General: He is not in acute distress.     Appearance: He is ill-appearing.   HENT:      Mouth/Throat:      Mouth: Mucous membranes are dry.   Cardiovascular:      Rate and Rhythm: Tachycardia present. Rhythm irregular.   Abdominal:      General: There is no distension.      Palpations: Abdomen is soft.      Comments: PEG tube in place    Musculoskeletal:      Comments: LUE and LLE in contracture    Skin:     General: Skin is warm and dry.      Comments:  BLE with scattered superficial abrasions.    Neurological:      Comments:  Non-sedated. Nonverbal at baseline.       Recent Results (from the past 48 hours)   CBC with Differential    Collection Time: 10/26/24  2:22 AM   Result Value Ref Range    WBC 14.39 (H) 4.50 - 11.50 x10(3)/mcL    RBC 3.74 (L) 4.70 - 6.10 x10(6)/mcL    Hgb 9.7 (L) 14.0 - 18.0 g/dL    Hct 32.4 (L) 42.0 - 52.0 %    MCV 86.6 80.0 - 94.0 fL    MCH 25.9 (L) 27.0 - 31.0 pg    MCHC 29.9 (L) 33.0 - 36.0 g/dL    RDW 16.4 11.5 - 17.0 %    Platelet 221 130 - 400 x10(3)/mcL    MPV 11.1 (H) 7.4 - 10.4 fL    NRBC% 0.2 %   Manual Differential    Collection Time: 10/26/24  2:22 AM   Result Value Ref Range    WBC 14.39 x10(3)/mcL    Neutrophils % 60 %    Lymphs % 12 %    Monocytes % 25 %    Eosinophils % 3 %    Neutrophils Abs 8.634 2.1 - 9.2 x10(3)/mcL    Lymphs Abs 1.7268 0.6 - 4.6 x10(3)/mcL    Monocytes Abs 3.5975 (H) 0.1 - 1.3 x10(3)/mcL    Eosinophils Abs 0.4317 0 - 0.9 x10(3)/mcL    Platelets Normal Normal, Adequate    RBC Morph Abnormal (A) Normal    Poikilocytosis 1+ (A) (none)    An Cells 1+ (A) (none)    Ovalocytes 1+ (A) (none)   Comprehensive Metabolic Panel (CMP)    Collection Time: 10/26/24  6:42 AM   Result Value Ref Range    Sodium 147 (H) 136 - 145 mmol/L    Potassium 3.5 3.5 - 5.1 mmol/L    Chloride 112 (H) 98 - 107 mmol/L    CO2 23 23 - 31 mmol/L    Glucose 48 (LL) 82 - 115 mg/dL    Blood Urea Nitrogen 13.6 8.4 - 25.7 mg/dL    Creatinine 0.57 (L) 0.72 - 1.25 mg/dL    Calcium 7.8 (L) 8.8 - 10.0 mg/dL    Protein Total 6.0 5.8 - 7.6 gm/dL    Albumin 2.1 (L) 3.4 - 4.8 g/dL    Globulin 3.9 (H) 2.4 - 3.5 gm/dL    Albumin/Globulin Ratio 0.5 (L) 1.1 - 2.0 ratio    Bilirubin Total 0.8 <=1.5 mg/dL     40 - 150 unit/L    ALT 12 0 - 55 unit/L    AST 18 5 - 34 unit/L    eGFR >60 mL/min/1.73/m2    Anion Gap 12.0 mEq/L    BUN/Creatinine Ratio 24    Magnesium    Collection Time: 10/26/24  6:42 AM   Result Value Ref Range    Magnesium Level 2.00 1.60 - 2.60 mg/dL   POCT glucose    Collection Time: 10/26/24 10:43 AM    Result Value Ref Range    POCT Glucose 40 (LL) 70 - 110 mg/dL   POCT glucose    Collection Time: 10/26/24 10:44 AM   Result Value Ref Range    POCT Glucose 55 (L) 70 - 110 mg/dL   POCT glucose    Collection Time: 10/26/24  4:06 PM   Result Value Ref Range    POCT Glucose 26 (LL) 70 - 110 mg/dL   POCT glucose    Collection Time: 10/26/24  4:08 PM   Result Value Ref Range    POCT Glucose 93 70 - 110 mg/dL   Comprehensive Metabolic Panel    Collection Time: 10/26/24  4:22 PM   Result Value Ref Range    Sodium 143 136 - 145 mmol/L    Potassium 3.0 (L) 3.5 - 5.1 mmol/L    Chloride 109 (H) 98 - 107 mmol/L    CO2 25 23 - 31 mmol/L    Glucose 105 82 - 115 mg/dL    Blood Urea Nitrogen 11.7 8.4 - 25.7 mg/dL    Creatinine 0.58 (L) 0.72 - 1.25 mg/dL    Calcium 8.0 (L) 8.8 - 10.0 mg/dL    Protein Total 6.3 5.8 - 7.6 gm/dL    Albumin 2.0 (L) 3.4 - 4.8 g/dL    Globulin 4.3 (H) 2.4 - 3.5 gm/dL    Albumin/Globulin Ratio 0.5 (L) 1.1 - 2.0 ratio    Bilirubin Total 0.7 <=1.5 mg/dL     40 - 150 unit/L    ALT 13 0 - 55 unit/L    AST 16 5 - 34 unit/L    eGFR >60 mL/min/1.73/m2    Anion Gap 9.0 mEq/L    BUN/Creatinine Ratio 20    Comprehensive Metabolic Panel (CMP)    Collection Time: 10/27/24  3:48 AM   Result Value Ref Range    Sodium 144 136 - 145 mmol/L    Potassium 3.5 3.5 - 5.1 mmol/L    Chloride 108 (H) 98 - 107 mmol/L    CO2 20 (L) 23 - 31 mmol/L    Glucose 35 (LL) 82 - 115 mg/dL    Blood Urea Nitrogen 14.3 8.4 - 25.7 mg/dL    Creatinine 0.59 (L) 0.72 - 1.25 mg/dL    Calcium 8.0 (L) 8.8 - 10.0 mg/dL    Protein Total 6.2 5.8 - 7.6 gm/dL    Albumin 2.2 (L) 3.4 - 4.8 g/dL    Globulin 4.0 (H) 2.4 - 3.5 gm/dL    Albumin/Globulin Ratio 0.6 (L) 1.1 - 2.0 ratio    Bilirubin Total 0.9 <=1.5 mg/dL     40 - 150 unit/L    ALT 12 0 - 55 unit/L    AST 19 5 - 34 unit/L    eGFR >60 mL/min/1.73/m2    Anion Gap 16.0 mEq/L    BUN/Creatinine Ratio 24    Magnesium    Collection Time: 10/27/24  3:48 AM   Result Value Ref Range     Magnesium Level 1.90 1.60 - 2.60 mg/dL   CBC with Differential    Collection Time: 10/27/24  3:48 AM   Result Value Ref Range    WBC 8.21 4.50 - 11.50 x10(3)/mcL    RBC 3.56 (L) 4.70 - 6.10 x10(6)/mcL    Hgb 9.2 (L) 14.0 - 18.0 g/dL    Hct 30.4 (L) 42.0 - 52.0 %    MCV 85.4 80.0 - 94.0 fL    MCH 25.8 (L) 27.0 - 31.0 pg    MCHC 30.3 (L) 33.0 - 36.0 g/dL    RDW 16.2 11.5 - 17.0 %    Platelet 263 130 - 400 x10(3)/mcL    MPV 11.2 (H) 7.4 - 10.4 fL    Neut % 54.0 %    Lymph % 25.5 %    Mono % 16.0 %    Eos % 2.1 %    Basophil % 0.5 %    Lymph # 2.09 0.6 - 4.6 x10(3)/mcL    Neut # 4.44 2.1 - 9.2 x10(3)/mcL    Mono # 1.31 (H) 0.1 - 1.3 x10(3)/mcL    Eos # 0.17 0 - 0.9 x10(3)/mcL    Baso # 0.04 <=0.2 x10(3)/mcL    IG# 0.16 (H) 0 - 0.04 x10(3)/mcL    IG% 1.9 %    NRBC% 0.0 %   POCT glucose    Collection Time: 10/27/24 10:38 AM   Result Value Ref Range    POCT Glucose 92 70 - 110 mg/dL       X-Ray Chest 1 View    Result Date: 10/25/2024  EXAMINATION: XR CHEST 1 VIEW CLINICAL HISTORY: intubated; COMPARISON: 20 October 2024 FINDINGS: Frontal view of the chest was obtained.  Tracheostomy remains.  Heart and mediastinum unchanged.  No new focal consolidation or pneumothorax.     No acute findings. Electronically signed by: Tani Calderon Date:    10/25/2024 Time:    07:34    Echo    Result Date: 10/22/2024    Left Ventricle: The left ventricle is normal in size. Mildly increased wall thickness. There is normal systolic function with a visually estimated ejection fraction of 65%. Grade I diastolic dysfunction.   Right Ventricle: Normal right ventricular cavity size. Systolic function is normal.   Aortic Valve: There is mild aortic valve sclerosis.   Mitral Valve: There is mild (1+) regurgitation.   Tricuspid Valve: There is mild (1+) regurgitation.   The estimated pulmonary artery systolic pressure is 21 mmHg.   AICD/pacemaker lead noted.  No evidence of vegetation noted.   No evidence of valvular endocarditis noted.  If clinical  suspicion is high would recommend transesophageal echocardiogram.     CT Abdomen Pelvis With IV Contrast NO Oral Contrast    Result Date: 10/21/2024  EXAMINATION: CT ABDOMEN PELVIS WITH IV CONTRAST CLINICAL HISTORY: sacral ulcer with possible abscess/osteomyelitis; TECHNIQUE: Low dose axial images, sagittal and coronal reformations were obtained from the lung bases to the pubic symphysis following the IV administration of contrast. Automatic exposure control (AEC) is utilized to reduce patient radiation exposure. COMPARISON: 08/19/2024 FINDINGS: There are interstitial infiltrate seen in the lungs bilaterally with developing areas of consolidation in the lower lobes.  These are worse than prior examination. There is a gastrostomy tube in the stomach. The liver appears normal.  No liver mass or lesion is seen.  Portal and hepatic veins appear normal. The gallbladder appears normal.  No obvious gallstones are seen.  No biliary dilatation is seen.  No pericholecystic fluid is seen. The pancreas appears normal.  No pancreatic mass or lesion is seen. The spleen shows no acute abnormality. The adrenal glands appear normal.  No adrenal nodule is seen. The kidneys appear normal in size.  There is a cyst in the midpole of the right kidney.  There is a cyst in the midpole the left kidney.  No hydronephrosis is seen.  No hydroureter is seen.  No nephrolithiasis is seen.  No obvious ureteral stones are seen. The urinary bladder wall is diffusely thickened and there are inflammatory changes associated with the urinary bladder.  Findings are consistent with cystitis. No colitis is seen.  No diverticulitis is seen.  No obvious colonic mass or lesion is seen. No free air is seen.  No free fluid is seen. There is a sacral decubitus seen just to the right of the superior gluteal fold.  This is new since the prior examination.  No abscess or fluid collection is seen.  Some skin thickening inflammatory changes are seen.  No convincing  evidence of osteomyelitis is seen.     Interval development of sacral decubitus just to the right of the superior aspect of the vertebral fold.  No abscess or fluid collection is seen Findings seen consistent with urinary bladder wall thickening and inflammatory changes consistent with cystitis Interval development of bilateral patchy areas of pneumonia with developing consolidation in the lower lobes Electronically signed by: Erick Grant Date:    10/21/2024 Time:    17:22    X-Ray Chest AP Portable    Result Date: 10/20/2024  EXAMINATION: XR CHEST AP PORTABLE CLINICAL HISTORY: Sepsis, unspecified organism TECHNIQUE: One view COMPARISON: August 21, 2020. FINDINGS: Cardiopericardial silhouette appearance is similar.  Tracheostomy cannula is in similar location.  Right chest implanted cardiac device electrodes terminate within the right atrium and right ventricle.  No acute dense focal or segmental consolidation, congestive process, pleural effusions or pneumothorax.     No acute cardiopulmonary process identified. Electronically signed by: Sami Taylor Date:    10/20/2024 Time:    19:36      Imaging personally reviewed by myself and SP.    Assessment / Plan:     68 y.o. male known to Dr. Escalona from inpatient care (primary GI is Dr. 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   Admitted 10/20/24 for sepsis related to VRE enterococcus,  ESBL Klebsiella bacteremia, Klebsiella pneumoniae and Proteus mirabilis pneumonia.; UTI; AFib with RVR.  Patient with multiple prior admissions during which our group was consulted for complaints of coffee-ground emesis and tube feed intolerance.     TF intolerance / vomiting  - Suspect delayed gastric emptying   - KUB to rule out obstruction    - Keep HOB elevated at all times.   - Continue Reglan 10 mg TID; however, not ideal long term given age and comorbidities   - If no obstruction, can resume TFs at 10 cc/hr and increase by 10 cc q4 hours to goal of 75 cc / hr..  Monitor TF residuals q4 hours and hold if >150cc.  If patient continues to regurgitate meds and tube feeds through his mouth, will consider endoscopy to insert Jtube through current PEG tube to bypass the stomach.      Thank you for allowing us to participate in this patient's care.   Case and plan discussed with Dr. Gabriele Salcido

## 2024-10-28 LAB
ALBUMIN SERPL-MCNC: 2.1 G/DL (ref 3.4–4.8)
ALBUMIN/GLOB SERPL: 0.6 RATIO (ref 1.1–2)
ALP SERPL-CCNC: 108 UNIT/L (ref 40–150)
ALT SERPL-CCNC: 11 UNIT/L (ref 0–55)
ANION GAP SERPL CALC-SCNC: 11 MEQ/L
AST SERPL-CCNC: 19 UNIT/L (ref 5–34)
BASOPHILS # BLD AUTO: 0.04 X10(3)/MCL
BASOPHILS NFR BLD AUTO: 0.5 %
BILIRUB SERPL-MCNC: 0.9 MG/DL
BUN SERPL-MCNC: 11.3 MG/DL (ref 8.4–25.7)
CALCIUM SERPL-MCNC: 7.7 MG/DL (ref 8.8–10)
CHLORIDE SERPL-SCNC: 108 MMOL/L (ref 98–107)
CO2 SERPL-SCNC: 25 MMOL/L (ref 23–31)
CREAT SERPL-MCNC: 0.61 MG/DL (ref 0.72–1.25)
CREAT/UREA NIT SERPL: 19
EOSINOPHIL # BLD AUTO: 0.09 X10(3)/MCL (ref 0–0.9)
EOSINOPHIL NFR BLD AUTO: 1.2 %
ERYTHROCYTE [DISTWIDTH] IN BLOOD BY AUTOMATED COUNT: 16.3 % (ref 11.5–17)
GFR SERPLBLD CREATININE-BSD FMLA CKD-EPI: >60 ML/MIN/1.73/M2
GLOBULIN SER-MCNC: 3.8 GM/DL (ref 2.4–3.5)
GLUCOSE SERPL-MCNC: 68 MG/DL (ref 82–115)
GLUCOSE SERPL-MCNC: 85 MG/DL (ref 70–110)
HCT VFR BLD AUTO: 27.3 % (ref 42–52)
HGB BLD-MCNC: 8.6 G/DL (ref 14–18)
IMM GRANULOCYTES # BLD AUTO: 0.1 X10(3)/MCL (ref 0–0.04)
IMM GRANULOCYTES NFR BLD AUTO: 1.4 %
LYMPHOCYTES # BLD AUTO: 2.48 X10(3)/MCL (ref 0.6–4.6)
LYMPHOCYTES NFR BLD AUTO: 33.6 %
MAGNESIUM SERPL-MCNC: 1.8 MG/DL (ref 1.6–2.6)
MCH RBC QN AUTO: 25.7 PG (ref 27–31)
MCHC RBC AUTO-ENTMCNC: 31.5 G/DL (ref 33–36)
MCV RBC AUTO: 81.5 FL (ref 80–94)
MONOCYTES # BLD AUTO: 0.79 X10(3)/MCL (ref 0.1–1.3)
MONOCYTES NFR BLD AUTO: 10.7 %
NEUTROPHILS # BLD AUTO: 3.89 X10(3)/MCL (ref 2.1–9.2)
NEUTROPHILS NFR BLD AUTO: 52.6 %
NRBC BLD AUTO-RTO: 0 %
PLATELET # BLD AUTO: 229 X10(3)/MCL (ref 130–400)
PMV BLD AUTO: 10.7 FL (ref 7.4–10.4)
POCT GLUCOSE: 53 MG/DL (ref 70–110)
POCT GLUCOSE: 69 MG/DL (ref 70–110)
POCT GLUCOSE: 69 MG/DL (ref 70–110)
POCT GLUCOSE: 76 MG/DL (ref 70–110)
POCT GLUCOSE: 80 MG/DL (ref 70–110)
POCT GLUCOSE: 81 MG/DL (ref 70–110)
POCT GLUCOSE: 86 MG/DL (ref 70–110)
POTASSIUM SERPL-SCNC: 3.2 MMOL/L (ref 3.5–5.1)
PROT SERPL-MCNC: 5.9 GM/DL (ref 5.8–7.6)
RBC # BLD AUTO: 3.35 X10(6)/MCL (ref 4.7–6.1)
SODIUM SERPL-SCNC: 144 MMOL/L (ref 136–145)
WBC # BLD AUTO: 7.39 X10(3)/MCL (ref 4.5–11.5)

## 2024-10-28 PROCEDURE — 63600175 PHARM REV CODE 636 W HCPCS

## 2024-10-28 PROCEDURE — 99900031 HC PATIENT EDUCATION (STAT)

## 2024-10-28 PROCEDURE — 25000003 PHARM REV CODE 250: Performed by: NURSE PRACTITIONER

## 2024-10-28 PROCEDURE — 25000003 PHARM REV CODE 250

## 2024-10-28 PROCEDURE — 99233 SBSQ HOSP IP/OBS HIGH 50: CPT | Mod: ,,,

## 2024-10-28 PROCEDURE — 27100171 HC OXYGEN HIGH FLOW UP TO 24 HOURS

## 2024-10-28 PROCEDURE — 99900035 HC TECH TIME PER 15 MIN (STAT)

## 2024-10-28 PROCEDURE — 25000003 PHARM REV CODE 250: Performed by: GENERAL PRACTICE

## 2024-10-28 PROCEDURE — 63600175 PHARM REV CODE 636 W HCPCS: Performed by: GENERAL PRACTICE

## 2024-10-28 PROCEDURE — 20000000 HC ICU ROOM

## 2024-10-28 PROCEDURE — 36415 COLL VENOUS BLD VENIPUNCTURE: CPT

## 2024-10-28 PROCEDURE — 99900026 HC AIRWAY MAINTENANCE (STAT)

## 2024-10-28 PROCEDURE — 80053 COMPREHEN METABOLIC PANEL: CPT

## 2024-10-28 PROCEDURE — 27000207 HC ISOLATION

## 2024-10-28 PROCEDURE — 63600175 PHARM REV CODE 636 W HCPCS: Performed by: HOSPITALIST

## 2024-10-28 PROCEDURE — 94760 N-INVAS EAR/PLS OXIMETRY 1: CPT

## 2024-10-28 PROCEDURE — 85025 COMPLETE CBC W/AUTO DIFF WBC: CPT

## 2024-10-28 PROCEDURE — 83735 ASSAY OF MAGNESIUM: CPT

## 2024-10-28 PROCEDURE — 94003 VENT MGMT INPAT SUBQ DAY: CPT

## 2024-10-28 PROCEDURE — 27200966 HC CLOSED SUCTION SYSTEM

## 2024-10-28 RX ORDER — POTASSIUM CHLORIDE 14.9 MG/ML
20 INJECTION INTRAVENOUS ONCE
Status: COMPLETED | OUTPATIENT
Start: 2024-10-28 | End: 2024-10-28

## 2024-10-28 RX ORDER — METOCLOPRAMIDE HYDROCHLORIDE 5 MG/5ML
10 SOLUTION ORAL EVERY 8 HOURS PRN
Status: DISCONTINUED | OUTPATIENT
Start: 2024-10-28 | End: 2024-11-18 | Stop reason: HOSPADM

## 2024-10-28 RX ADMIN — METOCLOPRAMIDE HYDROCHLORIDE 10 MG: 5 SOLUTION ORAL at 06:10

## 2024-10-28 RX ADMIN — QUETIAPINE FUMARATE 50 MG: 25 TABLET ORAL at 08:10

## 2024-10-28 RX ADMIN — METOPROLOL TARTRATE 25 MG: 25 TABLET, FILM COATED ORAL at 08:10

## 2024-10-28 RX ADMIN — METOPROLOL TARTRATE 25 MG: 25 TABLET, FILM COATED ORAL at 09:10

## 2024-10-28 RX ADMIN — ATORVASTATIN CALCIUM 10 MG: 10 TABLET, FILM COATED ORAL at 08:10

## 2024-10-28 RX ADMIN — GUAIFENESIN AND DEXTROMETHORPHAN 10 ML: 100; 10 SYRUP ORAL at 12:10

## 2024-10-28 RX ADMIN — GUAIFENESIN AND DEXTROMETHORPHAN 10 ML: 100; 10 SYRUP ORAL at 01:10

## 2024-10-28 RX ADMIN — SODIUM CHLORIDE 2 G: 9 INJECTION, SOLUTION INTRAVENOUS at 06:10

## 2024-10-28 RX ADMIN — PANTOPRAZOLE SODIUM 40 MG: 40 INJECTION, POWDER, LYOPHILIZED, FOR SOLUTION INTRAVENOUS at 08:10

## 2024-10-28 RX ADMIN — SCOPOLAMINE 1 PATCH: 1 PATCH TRANSDERMAL at 10:10

## 2024-10-28 RX ADMIN — RIVAROXABAN 20 MG: 10 TABLET, FILM COATED ORAL at 09:10

## 2024-10-28 RX ADMIN — LINEZOLID 600 MG: 600 INJECTION, SOLUTION INTRAVENOUS at 08:10

## 2024-10-28 RX ADMIN — SODIUM CHLORIDE 2 G: 9 INJECTION, SOLUTION INTRAVENOUS at 11:10

## 2024-10-28 RX ADMIN — QUETIAPINE FUMARATE 50 MG: 25 TABLET ORAL at 09:10

## 2024-10-28 RX ADMIN — POTASSIUM CHLORIDE 20 MEQ: 14.9 INJECTION INTRAVENOUS at 10:10

## 2024-10-28 RX ADMIN — LINEZOLID 600 MG: 600 INJECTION, SOLUTION INTRAVENOUS at 09:10

## 2024-10-28 RX ADMIN — GUAIFENESIN AND DEXTROMETHORPHAN 10 ML: 100; 10 SYRUP ORAL at 07:10

## 2024-10-28 RX ADMIN — METOPROLOL TARTRATE 25 MG: 25 TABLET, FILM COATED ORAL at 01:10

## 2024-10-28 RX ADMIN — SODIUM CHLORIDE 2 G: 9 INJECTION, SOLUTION INTRAVENOUS at 02:10

## 2024-10-28 NOTE — SUBJECTIVE & OBJECTIVE
Subjective:     HPI:  Wound medicine re-check    The patient is a 69 year old male who presented to [facility] ED on 10/20/24 from [facility] with decreased responsiveness, sepsis, and recurrent UTI. Noted to be hypotensive with Afib and RVR, also requiring mechanical ventilation and admitted to the ICU.   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 Patient is non-verbal at baseline, contracted upper and lower extremities on left side. 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   Blood cultures on admission + Enterococcus faecium - VRE per BCID. Urine culture with Klebsiella and Proteus. ID guiding antibiotic stewardship, currently receiving Daptomycin, Linezolid, and cefepime.     Patient admitted with unstageable pressure ulcer of sacrum; seen by inpatient wound nurse and treatment was iniatated, wound medicine consulted for evaluation and possible debridement.   No family present at time of assessment, all information gained through chart review. In June 2024 appears that patient was seen by wound  for sacral pressure ulcer, no visit notes or images availabe from this time frame. First image of sacral region in May 2024 with wound of sacrum/coccyx. In July 2024 images appears that wound had healed with remaining dark discoloration of sacrum/buttocks and then again noted with wound in images of late August 2024; appears to have evolved and worsened up to this point.  Initial evaluation on 10/22/24 - several pressure ulcers with most concerning is the sacral/coccyx unstageable ulcer. Also with fecal incontinence which is contaminating the wound as it is veryc close to the anus. He is contracted and we were unable to get full visualization and positioning for bedside debridement.  Recommendations made for palliative care consultation  to discuss overall goals of care.   Palliative care consulted and discussion held with family in which they have decided to continue with treatment.     10/28/24 - wound re-check today. Met patient in room IN02, he is awake, non-verbal with trach on mechanical ventilation. Alone in room on ICU low air loss specialty bed with bilateral heel boots in place. Assisted by ICU nurses and tech.   Difficult to reposition because of contractures. Moves right upper arm freely, grabbing at nurse and bed rails in response to repositioning and wound treatment. No acute distress.            Hospital Course:   No notes on file      Follow-up For: Procedure(s) (LRB):  EGD (N/A)    Post-Operative Day:      Scheduled Meds:   atorvastatin  10 mg Per G Tube Daily    linezolid  600 mg Intravenous Q12H    meropenem IV (PEDS and ADULTS)  2 g Intravenous Q8H    metoprolol tartrate  25 mg Per G Tube TID    pantoprazole  40 mg Intravenous Daily    QUEtiapine  50 mg Per G Tube BID    rivaroxaban  20 mg Per G Tube Nightly    scopolamine  1 patch Transdermal Q3 Days     Continuous Infusions:  PRN Meds:  Current Facility-Administered Medications:     acetaminophen, 650 mg, Per G Tube, Q6H PRN    dextromethorphan-guaiFENesin  mg/5 ml, 10 mL, Per G Tube, Q4H PRN    dextrose 10%, 12.5 g, Intravenous, PRN    dextrose 10%, 25 g, Intravenous, PRN    glucagon (human recombinant), 1 mg, Intramuscular, PRN    hydrALAZINE, 10 mg, Intravenous, Q4H PRN    metoclopramide HCl, 10 mg, Oral, Q8H PRN    metoprolol, 5 mg, Intravenous, Q5 Min PRN    naloxone, 0.02 mg, Intravenous, PRN    sodium chloride 0.9%, 10 mL, Intravenous, Q12H PRN    Review of Systems   Unable to perform ROS: Patient nonverbal     Objective:     Vital Signs (Most Recent):  Temp: 98.7 °F (37.1 °C) (10/28/24 1100)  Pulse: 85 (10/28/24 1400)  Resp: 20 (10/28/24 1400)  BP: 120/85 (10/28/24 1400)  SpO2: 100 % (10/28/24 1400) Vital  Signs (24h Range):  Temp:  [98.6 °F (37 °C)-100 °F (37.8 °C)] 98.7 °F (37.1 °C)  Pulse:  [] 85  Resp:  [12-33] 20  SpO2:  [97 %-100 %] 100 %  BP: (100-136)/(69-93) 120/85     Weight: 69.1 kg (152 lb 5.4 oz)  Body mass index is 22.49 kg/m².     Physical Exam  Vitals reviewed.   Constitutional:       Comments: Rectal tube     HENT:      Head:      Comments: Right bone flap with indentation, no apparent swelling       Neck:      Comments: Tracheostomy   Pulmonary:      Comments: Mechanical ventilation    Abdominal:      Comments: PEG   Skin:     General: Skin is warm and dry.      Capillary Refill: Capillary refill takes less than 2 seconds.      Findings: Wound present.             Comments: Sacral ulcer covered with brown/tan necrotic tissue. Undermining to the right.  Able to palpate bone but remains covered with thin layer of tissue. No evidence of purulent drainage; efren-wound skin moist and mildly macerated.     Left 1st and 2nd toe abrasions    Neurological:      Comments: Left upper and lower extremity contracted  Moves right upper extremity freely and reaches to grab onto nurse,  does not follow command        Sacrum: 8 x 8 x 3 cm  and undermining from 3 to 8 o'clock       Left medial knee: 1 x 1 x 0.1 cm Left lateral knee: 1.5 x 1.8 x 0.1 cm       Left anterior lower leg and 1st and 2nd toe abrasions                Laboratory:  A1C:   Recent Labs   Lab 08/26/24  1221   HGBA1C 5.3     BMP:   Recent Labs   Lab 10/28/24  0711      K 3.2*   *   CO2 25   BUN 11.3   CREATININE 0.61*   CALCIUM 7.7*   MG 1.80       CBC:   Recent Labs   Lab 10/28/24  0711   WBC 7.39   RBC 3.35*   HGB 8.6*   HCT 27.3*      MCV 81.5   MCH 25.7*   MCHC 31.5*     CMP:   Recent Labs   Lab 10/28/24  0711   CALCIUM 7.7*   ALBUMIN 2.1*      K 3.2*   CO2 25   *   BUN 11.3   CREATININE 0.61*   ALKPHOS 108   ALT 11   AST 19   BILITOT 0.9       LFTs:   Recent Labs   Lab 10/28/24  0711   ALT 11   AST 19    ALKPHOS 108   BILITOT 0.9   ALBUMIN 2.1*       Microbiology Results (last 7 days)       Procedure Component Value Units Date/Time    Blood Culture [[id number]]  (Normal) Collected: 10/22/24 0758    Order Status: Completed Specimen: Blood from Hand, Right Updated: 10/27/24 0901     Blood Culture No Growth at 5 days    Blood Culture [[id number]]  (Normal) Collected: 10/22/24 0758    Order Status: Completed Specimen: Blood from Hand, Left Updated: 10/27/24 0901     Blood Culture No Growth at 5 days    Blood culture #1 **CANNOT BE ORDERED STAT** [[id number]]  (Normal) Collected: 10/20/24 1603    Order Status: Completed Specimen: Blood Updated: 10/25/24 1701     Blood Culture No Growth at 5 days    Respiratory Culture [[id number]]  (Abnormal)  (Susceptibility) Collected: 10/20/24 2234    Order Status: Completed Specimen: Sputum Updated: 10/24/24 1313     Respiratory Culture Many Klebsiella pneumoniae esbl     Comment: with normal respiratory niyah         Moderate Proteus mirabilis     GRAM STAIN Quality 3+      Moderate Gram Negative Rods      Many Gram Positive Rods      Rare Yeast    Blood culture #2 **CANNOT BE ORDERED STAT** [[id number]]  (Abnormal)  (Susceptibility) Collected: 10/20/24 1603    Order Status: Completed Specimen: Blood Updated: 10/23/24 0702     Blood Culture Enterococcus faecium VRE      Klebsiella pneumoniae esbl     GRAM STAIN Gram Positive Cocci, probable Streptococcus      Seen in gram stain of broth only      2 of 2 bottles positive    BCID2 Panel [[id number]]  (Abnormal) Collected: 10/20/24 1603    Order Status: Completed Specimen: Blood Updated: 10/23/24 0636     CTX-M (ESBL ) N/A     IMP (Cabapenemase ) N/A     KPC resistance gene (Carbapenemase ) N/A     mcr-1 N/A     mecA ID N/A     Comment: Note: Antimicrobial resistance can occur via multiple mechanisms. A Not Detected result for antimicrobial resistance gene(s) does not indicate antimicrobial susceptibility.  Subculturing is required for species identification and susceptibility testing of   isolates.        mecA/C and MREJ (MRSA) gene N/A     NDM (Carbapenemase ) N/A     OXA-48-like (Carbapenemase ) N/A     Sofi/B (VRE gene) Detected     VIM (Carbapenemase ) N/A     Enterococcus faecalis Not Detected     Enterococcus faecium Detected     Listeria monocytogenes Not Detected     Staphylococcus spp. Not Detected     Staphylococcus aureus Not Detected     Staphylococcus epidermidis Not Detected     Staphylococcus lugdunensis Not Detected     Streptococcus spp. Not Detected     Streptococcus agalactiae (Group B) Not Detected     Streptococcus pneumoniae Not Detected     Streptococcus pyogenes (Group A) Not Detected     Acinetobacter calcoaceticus/baumannii complex Not Detected     Bacteroides fragilis Not Detected     Enterobacterales Not Detected     Enterobacter cloacae complex Not Detected     Escherichia coli Not Detected     Klebsiella aerogenes Not Detected     Klebsiella oxytoca Not Detected     Klebsiella pneumoniae group Detected     Comment: This is a corrected result. Previous result was Not Detected on 10/21/2024 at 1146 CDT.        Proteus spp. Not Detected     Salmonella spp. Not Detected     Serratia marcescens Not Detected     Haemophilus influenzae Not Detected     Neisseria meningitidis Not Detected     Pseudomonas aeruginosa Not Detected     Stenotrophomonas maltophilia Not Detected     Candida albicans Not Detected     Candida auris Not Detected     Candida glabrata Not Detected     Candida krusei Not Detected     Candida parapsilosis Not Detected     Candida tropicalis Not Detected     Cryptococcus neoformans/gattii Not Detected    Narrative:      The [employer] BCID2 Panel is a multiplexed nucleic acid test intended for the use with [employer]® 2.0 or [employer]® QRcao Systems for the simultaneous qualitative detection and identification of multiple bacterial and  yeast nucleic acids and select genetic determinants associated with antimicrobial resistance.  The [employer] BCID2 Panel test is performed directly on blood culture samples identified as positive by a continuous monitoring blood culture system.  Results are intended to be interpreted in conjunction with Gram stain results.    Urine culture [[id number]] Collected: 10/20/24 1654    Order Status: Completed Specimen: Urine Updated: 10/22/24 0761     Urine Culture No Growth          Diagnostic Results:  I have reviewed all pertinent imaging results/findings within the past 24 hours.

## 2024-10-28 NOTE — PROGRESS NOTES
"Gastroenterology Progress Note    Subjective/Interval History:    No acute events overnight.  Patient afebrile and hemodynamically stable.  No sedation or pressors.  KUB yesterday negative for obstruction.   Per nursing staff: attempted to restart tube feeds yesterday at trickle rate but turned off due to vomiting / regurgitation of tube feed contents.  Tube feeds turned off around 2000 last night per chart review.     ROS:  Review of Systems   Unable to perform ROS: Patient nonverbal       Vital Signs:  /89   Pulse 100   Temp 99.1 °F (37.3 °C) (Oral)   Resp 20   Ht 5' 9.02" (1.753 m)   Wt 69.1 kg (152 lb 5.4 oz)   SpO2 98%   BMI 22.49 kg/m²   Body mass index is 22.49 kg/m².    Physical Exam:  Physical Exam  Constitutional:       General: He is not in acute distress.     Appearance: He is ill-appearing.   Comments: sleeping.   HENT:      Mouth/Throat:      Mouth: Mucous membranes are dry.   Cardiovascular:      Rate and Rhythm: Regular rate. Rhythm irregular.   Abdominal:      General: There is no distension.      Palpations: Abdomen is soft.      Comments: PEG tube in place    Musculoskeletal:      Comments: LUE and LLE in contracture    Skin:     General: Skin is warm and dry.      Comments:  BLE with scattered superficial abrasions.    Neurological:      Comments: Non-sedated. Nonverbal at baseline.     Labs:  Recent Results (from the past 48 hours)   POCT glucose    Collection Time: 10/26/24 10:43 AM   Result Value Ref Range    POCT Glucose 40 (LL) 70 - 110 mg/dL   POCT glucose    Collection Time: 10/26/24 10:44 AM   Result Value Ref Range    POCT Glucose 55 (L) 70 - 110 mg/dL   POCT glucose    Collection Time: 10/26/24  4:06 PM   Result Value Ref Range    POCT Glucose 26 (LL) 70 - 110 mg/dL   POCT glucose    Collection Time: 10/26/24  4:08 PM   Result Value Ref Range    POCT Glucose 93 70 - 110 mg/dL   Comprehensive Metabolic Panel    Collection Time: 10/26/24  4:22 PM   Result Value Ref Range    " Sodium 143 136 - 145 mmol/L    Potassium 3.0 (L) 3.5 - 5.1 mmol/L    Chloride 109 (H) 98 - 107 mmol/L    CO2 25 23 - 31 mmol/L    Glucose 105 82 - 115 mg/dL    Blood Urea Nitrogen 11.7 8.4 - 25.7 mg/dL    Creatinine 0.58 (L) 0.72 - 1.25 mg/dL    Calcium 8.0 (L) 8.8 - 10.0 mg/dL    Protein Total 6.3 5.8 - 7.6 gm/dL    Albumin 2.0 (L) 3.4 - 4.8 g/dL    Globulin 4.3 (H) 2.4 - 3.5 gm/dL    Albumin/Globulin Ratio 0.5 (L) 1.1 - 2.0 ratio    Bilirubin Total 0.7 <=1.5 mg/dL     40 - 150 unit/L    ALT 13 0 - 55 unit/L    AST 16 5 - 34 unit/L    eGFR >60 mL/min/1.73/m2    Anion Gap 9.0 mEq/L    BUN/Creatinine Ratio 20    Comprehensive Metabolic Panel (CMP)    Collection Time: 10/27/24  3:48 AM   Result Value Ref Range    Sodium 144 136 - 145 mmol/L    Potassium 3.5 3.5 - 5.1 mmol/L    Chloride 108 (H) 98 - 107 mmol/L    CO2 20 (L) 23 - 31 mmol/L    Glucose 35 (LL) 82 - 115 mg/dL    Blood Urea Nitrogen 14.3 8.4 - 25.7 mg/dL    Creatinine 0.59 (L) 0.72 - 1.25 mg/dL    Calcium 8.0 (L) 8.8 - 10.0 mg/dL    Protein Total 6.2 5.8 - 7.6 gm/dL    Albumin 2.2 (L) 3.4 - 4.8 g/dL    Globulin 4.0 (H) 2.4 - 3.5 gm/dL    Albumin/Globulin Ratio 0.6 (L) 1.1 - 2.0 ratio    Bilirubin Total 0.9 <=1.5 mg/dL     40 - 150 unit/L    ALT 12 0 - 55 unit/L    AST 19 5 - 34 unit/L    eGFR >60 mL/min/1.73/m2    Anion Gap 16.0 mEq/L    BUN/Creatinine Ratio 24    Magnesium    Collection Time: 10/27/24  3:48 AM   Result Value Ref Range    Magnesium Level 1.90 1.60 - 2.60 mg/dL   CBC with Differential    Collection Time: 10/27/24  3:48 AM   Result Value Ref Range    WBC 8.21 4.50 - 11.50 x10(3)/mcL    RBC 3.56 (L) 4.70 - 6.10 x10(6)/mcL    Hgb 9.2 (L) 14.0 - 18.0 g/dL    Hct 30.4 (L) 42.0 - 52.0 %    MCV 85.4 80.0 - 94.0 fL    MCH 25.8 (L) 27.0 - 31.0 pg    MCHC 30.3 (L) 33.0 - 36.0 g/dL    RDW 16.2 11.5 - 17.0 %    Platelet 263 130 - 400 x10(3)/mcL    MPV 11.2 (H) 7.4 - 10.4 fL    Neut % 54.0 %    Lymph % 25.5 %    Mono % 16.0 %    Eos % 2.1 %     Basophil % 0.5 %    Lymph # 2.09 0.6 - 4.6 x10(3)/mcL    Neut # 4.44 2.1 - 9.2 x10(3)/mcL    Mono # 1.31 (H) 0.1 - 1.3 x10(3)/mcL    Eos # 0.17 0 - 0.9 x10(3)/mcL    Baso # 0.04 <=0.2 x10(3)/mcL    IG# 0.16 (H) 0 - 0.04 x10(3)/mcL    IG% 1.9 %    NRBC% 0.0 %   POCT glucose    Collection Time: 10/27/24  6:17 AM   Result Value Ref Range    POCT Glucose 71 70 - 110 mg/dL   POCT glucose    Collection Time: 10/27/24  6:19 AM   Result Value Ref Range    POCT Glucose 72 70 - 110 mg/dL   POCT glucose    Collection Time: 10/27/24 10:38 AM   Result Value Ref Range    POCT Glucose 92 70 - 110 mg/dL   POCT glucose    Collection Time: 10/27/24  6:13 PM   Result Value Ref Range    POCT Glucose 85 70 - 110 mg/dL   Comprehensive Metabolic Panel (CMP)    Collection Time: 10/28/24  7:11 AM   Result Value Ref Range    Sodium 144 136 - 145 mmol/L    Potassium 3.2 (L) 3.5 - 5.1 mmol/L    Chloride 108 (H) 98 - 107 mmol/L    CO2 25 23 - 31 mmol/L    Glucose 68 (L) 82 - 115 mg/dL    Blood Urea Nitrogen 11.3 8.4 - 25.7 mg/dL    Creatinine 0.61 (L) 0.72 - 1.25 mg/dL    Calcium 7.7 (L) 8.8 - 10.0 mg/dL    Protein Total 5.9 5.8 - 7.6 gm/dL    Albumin 2.1 (L) 3.4 - 4.8 g/dL    Globulin 3.8 (H) 2.4 - 3.5 gm/dL    Albumin/Globulin Ratio 0.6 (L) 1.1 - 2.0 ratio    Bilirubin Total 0.9 <=1.5 mg/dL     40 - 150 unit/L    ALT 11 0 - 55 unit/L    AST 19 5 - 34 unit/L    eGFR >60 mL/min/1.73/m2    Anion Gap 11.0 mEq/L    BUN/Creatinine Ratio 19    Magnesium    Collection Time: 10/28/24  7:11 AM   Result Value Ref Range    Magnesium Level 1.80 1.60 - 2.60 mg/dL   CBC with Differential    Collection Time: 10/28/24  7:11 AM   Result Value Ref Range    WBC 7.39 4.50 - 11.50 x10(3)/mcL    RBC 3.35 (L) 4.70 - 6.10 x10(6)/mcL    Hgb 8.6 (L) 14.0 - 18.0 g/dL    Hct 27.3 (L) 42.0 - 52.0 %    MCV 81.5 80.0 - 94.0 fL    MCH 25.7 (L) 27.0 - 31.0 pg    MCHC 31.5 (L) 33.0 - 36.0 g/dL    RDW 16.3 11.5 - 17.0 %    Platelet 229 130 - 400 x10(3)/mcL    MPV 10.7  (H) 7.4 - 10.4 fL    Neut % 52.6 %    Lymph % 33.6 %    Mono % 10.7 %    Eos % 1.2 %    Basophil % 0.5 %    Lymph # 2.48 0.6 - 4.6 x10(3)/mcL    Neut # 3.89 2.1 - 9.2 x10(3)/mcL    Mono # 0.79 0.1 - 1.3 x10(3)/mcL    Eos # 0.09 0 - 0.9 x10(3)/mcL    Baso # 0.04 <=0.2 x10(3)/mcL    IG# 0.10 (H) 0 - 0.04 x10(3)/mcL    IG% 1.4 %    NRBC% 0.0 %       Imaging:  X-Ray Abdomen AP 1 View    Result Date: 10/27/2024  EXAMINATION XR ABDOMEN AP 1 VIEW CLINICAL HISTORY r/o obstruction/ileus- not tolerating TF; TECHNIQUE A total of 2 AP image(s) of the abdomen. COMPARISON 21 July 2024 FINDINGS Lines/tubes/devices: Left upper quadrant percutaneous gastrostomy tube remains in place.  Cardiac pacemaker/ICD leads again partially visualized.  Fernandez catheter projects over the pelvis. A non-obstructed bowel gas pattern is present. No intra-abdominal mass effect is appreciated. Detection of air-fluid levels and low-volume pneumoperitoneum is limited due to supine technique. No convincing acute abnormality of the visualized lower thoracic cavity.  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  No definite acutely displaced fracture or joint malalignment is identified. IMPRESSION 1. No convincing acute intra-abdominal process. 2. Lines/tubes as above. 3. 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 Electronically signed by: Isaac Carcamo Date:    10/27/2024 Time:    13:56    X-Ray Chest 1 View    Result Date: 10/25/2024  EXAMINATION: XR CHEST 1 VIEW CLINICAL HISTORY: intubated; COMPARISON: 20 October 2024 FINDINGS: Frontal view of the chest was obtained.  Tracheostomy remains.  Heart and mediastinum unchanged.  No new focal consolidation or pneumothorax.     No acute findings. Electronically signed by: Tani Calderon Date:    10/25/2024 Time:    07:34    Echo    Result Date: 10/22/2024    Left Ventricle: The  left ventricle is normal in size. Mildly increased wall thickness. There is normal systolic function with a visually estimated ejection fraction of 65%. Grade I diastolic dysfunction.   Right Ventricle: Normal right ventricular cavity size. Systolic function is normal.   Aortic Valve: There is mild aortic valve sclerosis.   Mitral Valve: There is mild (1+) regurgitation.   Tricuspid Valve: There is mild (1+) regurgitation.   The estimated pulmonary artery systolic pressure is 21 mmHg.   AICD/pacemaker lead noted.  No evidence of vegetation noted.   No evidence of valvular endocarditis noted.  If clinical suspicion is high would recommend transesophageal echocardiogram.     CT Abdomen Pelvis With IV Contrast NO Oral Contrast    Result Date: 10/21/2024  EXAMINATION: CT ABDOMEN PELVIS WITH IV CONTRAST CLINICAL HISTORY: sacral ulcer with possible abscess/osteomyelitis; TECHNIQUE: Low dose axial images, sagittal and coronal reformations were obtained from the lung bases to the pubic symphysis following the IV administration of contrast. Automatic exposure control (AEC) is utilized to reduce patient radiation exposure. COMPARISON: 08/19/2024 FINDINGS: There are interstitial infiltrate seen in the lungs bilaterally with developing areas of consolidation in the lower lobes.  These are worse than prior examination. There is a gastrostomy tube in the stomach. The liver appears normal.  No liver mass or lesion is seen.  Portal and hepatic veins appear normal. The gallbladder appears normal.  No obvious gallstones are seen.  No biliary dilatation is seen.  No pericholecystic fluid is seen. The pancreas appears normal.  No pancreatic mass or lesion is seen. The spleen shows no acute abnormality. The adrenal glands appear normal.  No adrenal nodule is seen. The kidneys appear normal in size.  There is a cyst in the midpole of the right kidney.  There is a cyst in the midpole the left kidney.  No hydronephrosis is seen.  No  hydroureter is seen.  No nephrolithiasis is seen.  No obvious ureteral stones are seen. The urinary bladder wall is diffusely thickened and there are inflammatory changes associated with the urinary bladder.  Findings are consistent with cystitis. No colitis is seen.  No diverticulitis is seen.  No obvious colonic mass or lesion is seen. No free air is seen.  No free fluid is seen. There is a sacral decubitus seen just to the right of the superior gluteal fold.  This is new since the prior examination.  No abscess or fluid collection is seen.  Some skin thickening inflammatory changes are seen.  No convincing evidence of osteomyelitis is seen.     Interval development of sacral decubitus just to the right of the superior aspect of the vertebral fold.  No abscess or fluid collection is seen Findings seen consistent with urinary bladder wall thickening and inflammatory changes consistent with cystitis Interval development of bilateral patchy areas of pneumonia with developing consolidation in the lower lobes Electronically signed by: Erick Grant Date:    10/21/2024 Time:    17:22    X-Ray Chest AP Portable    Result Date: 10/20/2024  EXAMINATION: XR CHEST AP PORTABLE CLINICAL HISTORY: Sepsis, unspecified organism TECHNIQUE: One view COMPARISON: August 21, 2020. FINDINGS: Cardiopericardial silhouette appearance is similar.  Tracheostomy cannula is in similar location.  Right chest implanted cardiac device electrodes terminate within the right atrium and right ventricle.  No acute dense focal or segmental consolidation, congestive process, pleural effusions or pneumothorax.     No acute cardiopulmonary process identified. Electronically signed by: Sami Taylor Date:    10/20/2024 Time:    19:36         Assessment/Plan:    68 y.o. male known to Dr. Escalona from inpatient care (primary GI is Dr. 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   Admitted 10/20/24 for sepsis related to VRE enterococcus,  ESBL Klebsiella bacteremia, Klebsiella pneumoniae and Proteus mirabilis pneumonia.; UTI; AFib with RVR.  Patient with multiple prior admissions during which our group was consulted for complaints of coffee-ground emesis and tube feed intolerance.      TF intolerance / vomiting  - Suspect delayed gastric emptying   - Keep HOB elevated at all times.   - Continue Reglan 10 mg TID; however, not ideal long term given age and comorbidities   - Likely needs J-tube extension through PEG to bypass stomach.  Will discuss further with MD to determine timing.  Recs to follow.     TRUMAN Foster  Case and plan discussed with Dr. Gabriele Salcido

## 2024-10-28 NOTE — PROGRESS NOTES
[facility] - [facility]  Wound Care  Progress Note    Patient Name: Delio Daniel Jr.  MRN: [id number]  Admission Date: 10/20/2024  Hospital Length of Stay: 8 days  Attending Physician: Itz Francisco MD  Primary Care Provider: Jl Briones MD     Subjective:     HPI:  Wound medicine re-check    The patient is a 69 year old male who presented to [facility] ED on 10/20/24 from [facility] with decreased responsiveness, sepsis, and recurrent UTI. Noted to be hypotensive with Afib and RVR, also requiring mechanical ventilation and admitted to the ICU.   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 Patient is non-verbal at baseline, contracted upper and lower extremities on left side. 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   Blood cultures on admission + Enterococcus faecium - VRE per BCID. Urine culture with Klebsiella and Proteus. ID guiding antibiotic stewardship.     Patient admitted with unstageable pressure ulcer of sacrum; seen by inpatient wound nurse and treatment was iniatated, wound medicine consulted for evaluation and possible debridement.   No family present at time of assessment, all information gained through chart review. In June 2024 appears that patient was seen by wound  for sacral pressure ulcer, no visit notes or images availabe from this time frame. First image of sacral region in May 2024 with wound of sacrum/coccyx. In July 2024 images appears that wound had healed with remaining dark discoloration of sacrum/buttocks and then again noted with wound in images of late August 2024; appears to have evolved and worsened up to this point and admitted with unstageable sacral ulcer.   Initial evaluation on 10/22/24 - several pressure ulcers with most  concerning is the sacral/coccyx unstageable ulcer. Also with fecal incontinence which is contaminating the wound as it is veryc close to the anus. He is contracted and we were unable to get full visualization and positioning for bedside debridement. Recommendations made for palliative care consultation  to discuss overall goals of care.   Palliative care consulted and discussion held with family in which they have decided to continue with treatment.     10/28/24 - wound re-check today. Met patient in room IN02, he is awake, non-verbal with trach on mechanical ventilation. Alone in room on ICU low air loss specialty bed with bilateral heel boots in place. Assisted by ICU nurses and tech.   Difficult to reposition because of contractures. Moves right upper arm freely, grabbing at nurse and bed rails in response to repositioning and wound treatment. No acute distress.            Hospital Course:   No notes on file      Follow-up For: Procedure(s) (LRB):  EGD (N/A)    Post-Operative Day:      Scheduled Meds:   atorvastatin  10 mg Per G Tube Daily    linezolid  600 mg Intravenous Q12H    meropenem IV (PEDS and ADULTS)  2 g Intravenous Q8H    metoprolol tartrate  25 mg Per G Tube TID    pantoprazole  40 mg Intravenous Daily    QUEtiapine  50 mg Per G Tube BID    rivaroxaban  20 mg Per G Tube Nightly    scopolamine  1 patch Transdermal Q3 Days     Continuous Infusions:  PRN Meds:  Current Facility-Administered Medications:     acetaminophen, 650 mg, Per G Tube, Q6H PRN    dextromethorphan-guaiFENesin  mg/5 ml, 10 mL, Per G Tube, Q4H PRN    dextrose 10%, 12.5 g, Intravenous, PRN    dextrose 10%, 25 g, Intravenous, PRN    glucagon (human recombinant), 1 mg, Intramuscular, PRN    hydrALAZINE, 10 mg, Intravenous, Q4H PRN    metoclopramide HCl, 10 mg, Oral, Q8H PRN    metoprolol, 5 mg, Intravenous, Q5 Min PRN    naloxone, 0.02 mg, Intravenous, PRN    sodium chloride 0.9%, 10 mL, Intravenous, Q12H PRN    Review of Systems    Unable to perform ROS: Patient nonverbal     Objective:     Vital Signs (Most Recent):  Temp: 98.7 °F (37.1 °C) (10/28/24 1100)  Pulse: 85 (10/28/24 1400)  Resp: 20 (10/28/24 1400)  BP: 120/85 (10/28/24 1400)  SpO2: 100 % (10/28/24 1400) Vital Signs (24h Range):  Temp:  [98.6 °F (37 °C)-100 °F (37.8 °C)] 98.7 °F (37.1 °C)  Pulse:  [] 85  Resp:  [12-33] 20  SpO2:  [97 %-100 %] 100 %  BP: (100-136)/(69-93) 120/85     Weight: 69.1 kg (152 lb 5.4 oz)  Body mass index is 22.49 kg/m².     Physical Exam  Vitals reviewed.   Constitutional:       Comments: Rectal tube     HENT:      Head:      Comments: Right bone flap with indentation, no apparent swelling       Neck:      Comments: Tracheostomy   Pulmonary:      Comments: Mechanical ventilation    Abdominal:      Comments: PEG   Skin:     General: Skin is warm and dry.      Capillary Refill: Capillary refill takes less than 2 seconds.      Findings: Wound present.             Comments: Sacral ulcer covered with brown/tan necrotic tissue. Undermining to the right.  Able to palpate bone but remains covered with thin layer of tissue. No evidence of purulent drainage; efren-wound skin moist and mildly macerated.     Left 1st and 2nd toe abrasions    Neurological:      Comments: Left upper and lower extremity contracted  Moves right upper extremity freely and reaches to grab onto nurse,  does not follow command        Sacrum: 8 x 8 x 3 cm  and undermining from 3 to 8 o'clock       Left medial knee: 1 x 1 x 0.1 cm Left lateral knee: 1.5 x 1.8 x 0.1 cm       Left anterior lower leg and 1st and 2nd toe abrasions                Laboratory:  A1C:   Recent Labs   Lab 08/26/24  1221   HGBA1C 5.3     BMP:   Recent Labs   Lab 10/28/24  0711      K 3.2*   *   CO2 25   BUN 11.3   CREATININE 0.61*   CALCIUM 7.7*   MG 1.80       CBC:   Recent Labs   Lab 10/28/24  0711   WBC 7.39   RBC 3.35*   HGB 8.6*   HCT 27.3*      MCV 81.5   MCH 25.7*   MCHC 31.5*     CMP:    Recent Labs   Lab 10/28/24  0711   CALCIUM 7.7*   ALBUMIN 2.1*      K 3.2*   CO2 25   *   BUN 11.3   CREATININE 0.61*   ALKPHOS 108   ALT 11   AST 19   BILITOT 0.9       LFTs:   Recent Labs   Lab 10/28/24  0711   ALT 11   AST 19   ALKPHOS 108   BILITOT 0.9   ALBUMIN 2.1*       Microbiology Results (last 7 days)       Procedure Component Value Units Date/Time    Blood Culture [[id number]]  (Normal) Collected: 10/22/24 0758    Order Status: Completed Specimen: Blood from Hand, Right Updated: 10/27/24 0901     Blood Culture No Growth at 5 days    Blood Culture [[id number]]  (Normal) Collected: 10/22/24 0758    Order Status: Completed Specimen: Blood from Hand, Left Updated: 10/27/24 0901     Blood Culture No Growth at 5 days    Blood culture #1 **CANNOT BE ORDERED STAT** [[id number]]  (Normal) Collected: 10/20/24 1603    Order Status: Completed Specimen: Blood Updated: 10/25/24 1701     Blood Culture No Growth at 5 days    Respiratory Culture [[id number]]  (Abnormal)  (Susceptibility) Collected: 10/20/24 2234    Order Status: Completed Specimen: Sputum Updated: 10/24/24 1313     Respiratory Culture Many Klebsiella pneumoniae esbl     Comment: with normal respiratory niyah         Moderate Proteus mirabilis     GRAM STAIN Quality 3+      Moderate Gram Negative Rods      Many Gram Positive Rods      Rare Yeast    Blood culture #2 **CANNOT BE ORDERED STAT** [[id number]]  (Abnormal)  (Susceptibility) Collected: 10/20/24 1603    Order Status: Completed Specimen: Blood Updated: 10/23/24 0702     Blood Culture Enterococcus faecium VRE      Klebsiella pneumoniae esbl     GRAM STAIN Gram Positive Cocci, probable Streptococcus      Seen in gram stain of broth only      2 of 2 bottles positive    BCID2 Panel [[id number]]  (Abnormal) Collected: 10/20/24 1603    Order Status: Completed Specimen: Blood Updated: 10/23/24 0636     CTX-M (ESBL ) N/A     IMP (Cabapenemase ) N/A     KPC resistance gene  (Carbapenemase ) N/A     mcr-1 N/A     mecA ID N/A     Comment: Note: Antimicrobial resistance can occur via multiple mechanisms. A Not Detected result for antimicrobial resistance gene(s) does not indicate antimicrobial susceptibility. Subculturing is required for species identification and susceptibility testing of   isolates.        mecA/C and MREJ (MRSA) gene N/A     NDM (Carbapenemase ) N/A     OXA-48-like (Carbapenemase ) N/A     Sofi/B (VRE gene) Detected     VIM (Carbapenemase ) N/A     Enterococcus faecalis Not Detected     Enterococcus faecium Detected     Listeria monocytogenes Not Detected     Staphylococcus spp. Not Detected     Staphylococcus aureus Not Detected     Staphylococcus epidermidis Not Detected     Staphylococcus lugdunensis Not Detected     Streptococcus spp. Not Detected     Streptococcus agalactiae (Group B) Not Detected     Streptococcus pneumoniae Not Detected     Streptococcus pyogenes (Group A) Not Detected     Acinetobacter calcoaceticus/baumannii complex Not Detected     Bacteroides fragilis Not Detected     Enterobacterales Not Detected     Enterobacter cloacae complex Not Detected     Escherichia coli Not Detected     Klebsiella aerogenes Not Detected     Klebsiella oxytoca Not Detected     Klebsiella pneumoniae group Detected     Comment: This is a corrected result. Previous result was Not Detected on 10/21/2024 at 1146 CDT.        Proteus spp. Not Detected     Salmonella spp. Not Detected     Serratia marcescens Not Detected     Haemophilus influenzae Not Detected     Neisseria meningitidis Not Detected     Pseudomonas aeruginosa Not Detected     Stenotrophomonas maltophilia Not Detected     Candida albicans Not Detected     Candida auris Not Detected     Candida glabrata Not Detected     Candida krusei Not Detected     Candida parapsilosis Not Detected     Candida tropicalis Not Detected     Cryptococcus neoformans/gattii Not Detected     Narrative:      The coin4ce BCID2 Panel is a multiplexed nucleic acid test intended for the use with coin4ce® FilmArray® 2.0 or coin4ce® FilmArray® [employer] for the simultaneous qualitative detection and identification of multiple bacterial and yeast nucleic acids and select genetic determinants associated with antimicrobial resistance.  The [employer] BCID2 Panel test is performed directly on blood culture samples identified as positive by a continuous monitoring blood culture system.  Results are intended to be interpreted in conjunction with Gram stain results.    Urine culture [[id number]] Collected: 10/20/24 1654    Order Status: Completed Specimen: Urine Updated: 10/22/24 0725     Urine Culture No Growth          Diagnostic Results:  I have reviewed all pertinent imaging results/findings within the past 24 hours.      Diagnostic Results:  I have reviewed all pertinent imaging results/findings within the past 24 hours.        CT Abdomen Pelvis With IV Contrast NO Oral Contrast  Status: Final result      MyChart Results Release     [facility] Status: Active  Results Release      PACS Images for [facility] [place] Viewer      Show images for CT Abdomen Pelvis With IV Contrast NO Oral Contrast  CT Abdomen Pelvis With IV Contrast NO Oral Contrast  Order: [id number]  Status: Final result       Visible to patient: Yes (not seen)       Next appt: 08/21/2025 at 07:15 AM in Radiology ([facility]-CT2 500 LB LIMIT)    0 Result Notes  Details     Reading Physician Reading Date Result Priority   Nellie Grant MD  [phone] 10/21/2024 Routine      Narrative & Impression  EXAMINATION:  CT ABDOMEN PELVIS WITH IV CONTRAST     CLINICAL HISTORY:  sacral ulcer with possible abscess/osteomyelitis;     TECHNIQUE:  Low dose axial images, sagittal and coronal reformations were obtained from the lung bases to the pubic symphysis following the IV administration of contrast. Automatic exposure control (AEC) is utilized to reduce  patient radiation exposure.     COMPARISON:  08/19/2024     FINDINGS:  There are interstitial infiltrate seen in the lungs bilaterally with developing areas of consolidation in the lower lobes.  These are worse than prior examination.     There is a gastrostomy tube in the stomach.     The liver appears normal.  No liver mass or lesion is seen.  Portal and hepatic veins appear normal.     The gallbladder appears normal.  No obvious gallstones are seen.  No biliary dilatation is seen.  No pericholecystic fluid is seen.     The pancreas appears normal.  No pancreatic mass or lesion is seen.     The spleen shows no acute abnormality.     The adrenal glands appear normal.  No adrenal nodule is seen.     The kidneys appear normal in size.  There is a cyst in the midpole of the right kidney.  There is a cyst in the midpole the left kidney.  No hydronephrosis is seen.  No hydroureter is seen.  No nephrolithiasis is seen.  No obvious ureteral stones are seen.     The urinary bladder wall is diffusely thickened and there are inflammatory changes associated with the urinary bladder.  Findings are consistent with cystitis.     No colitis is seen.  No diverticulitis is seen.  No obvious colonic mass or lesion is seen.     No free air is seen.  No free fluid is seen.     There is a sacral decubitus seen just to the right of the superior gluteal fold.  This is new since the prior examination.  No abscess or fluid collection is seen.  Some skin thickening inflammatory changes are seen.  No convincing evidence of osteomyelitis is seen.     Impression:     Interval development of sacral decubitus just to the right of the superior aspect of the vertebral fold.  No abscess or fluid collection is seen     Findings seen consistent with urinary bladder wall thickening and inflammatory changes consistent with cystitis     Interval development of bilateral patchy areas of pneumonia with developing consolidation in the lower lobes         Electronically signed by:Erick Grant  Date:                                            10/21/2024  Time:                                           17:22          Exam Ended: 10/21/24 17:05 CDT            Assessment/Plan:     Unstageable pressure ulcer of sacrum - present on admission - possible source of infection   Stage 3 pressure ulcer of left knee - present on admission  Stage 4 pressure ulcer of left lateral ankle - present on admission  CVA with residual cognitive/motor deficits; contractures   VRE bacteremia  Klebsiella bacteremia  Tracheostomy and PEG tube dependent           PLAN:     Chart reviewed, patient examined and wounds assessed.  Opinion: Wounds re assessed today, Sacral ulcer is still covered with brown/tan devitalized tissue/slough. There is no purulent drainage and relatively no odor today. Does not appear healthy but does not appear obviously infected.  Reasonable to continue local wound care and autolytic debridement. Will change topical dressings to Mesalt.   Operative debridement pending further decisions by patients family considering high risk for poor wound healing. Ostomy deferred at this time in the setting of decompensation with acute infections and high risk for poor wound healing.   Left knee and ankle wounds without signs of infection. Continue to cleanse with Vashe, apply mupirocin ointment and cover with foam border daily.   Left knee is often rubbing against side rail, foam dressing that was in place was actually worn and torn in the area where ulceration is present underneath the dressing. Discussed with Nurse need to pad side rail with pillow to prevent pressure to this area.   Wound care orders: Sacrum - cleanse with Vashe, apply Vashe moistened Mesalt to wound bed, cover with ABD pad and secure with cloth tape, BID and PRN.   Monitor for signs & symptoms of deterioration  Offloading of sacrum/buttocks/heels at all times: YOLETTE mattress, turning q 2 hrs; use of wedges and  heel offloading devices to be used at all times while in bed; ( CHATA Zavala). This needs to be reinforced by every staff nurse caring for patient on every shift of every day.  Incontinence: control moisture/wound contamination: No briefs; use things such as purewick or underwood catheter; rectal tube  Nutrition: Follow RD  recommendations for tube feeds; GI following for frequent regurgitation of feeds, planning for EGD with J-tube extention of patients PEG due to persistent issues with delayed gastric emptying and will likely require post-pyloric feeding  Will try to follow weekly while admitted, but every nurse assigned to patient on every shift of every day needs to address daily wound care dressing changes and offloading modalities including using heel offloading devices, wedges, YOLETTE mattress etc  Discussed with patient as well as nurse caring for patient today           The time spent including preparing to see the patient, obtaining patient history and assessment, evaluation of the plan of care, patient/caregiver counseling and education, orders, documentation, coordination of care, and other professional medical management activities for today's encounter was 60   minutes              TRUMAN Romeo  Wound Care  [facility] - [facility] ICU

## 2024-10-28 NOTE — PROGRESS NOTES
[facility] - Emergency Dept  Pulmonary Critical Care Note    Patient Name: Delio Daniel Jr.  MRN: [id number]  Admission Date: 10/20/2024  Hospital Length of Stay: 8 days  Code Status: Full Code  Attending Provider: Itz Francisco MD  Primary Care Provider: Jl Briones MD     Subjective:     HPI:   Patient is a 68 y/o male with extensive PMH who presented from NH on 10/20/24 with decreased responsiveness, severe sepsis, and recurrent UTI. 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     Patient transferred to ED from [facility] with lethargy and tachycardia. Family at bedside reports patient is nonverbal at baseline but typically more alert. In the ED, patient is febrile, tachycardic with -190s, tachypneic, /60. EKG shows Afib with RVR. Diltiazem drip started in ED. Labs are significant for WBC of 16.5, lactic acid of 3.3, Cr 1.48, BUN 45.3, Na 155, K+ 5.1, troponin elevated at 0.118. UA with evidence of UTI. Of note, pt was being treated outpatient for a UTI, currently on day 4 of 7 day Rocephin course. Urine culture 10/16/24 with multidrug resistant Klebsiella pneumonia and Proteus mirabilis with susceptibility to Cefepime. Patient received 3L of NS and initiated on Vanc and Cefepime in ED. Admitted to the ICU on mechanical ventilation via trach.    Hospital Course/Significant events:  10/20/24: Admitted to ICU for severe sepsis     24 Hour Interval History:  No acute events overnight.  Pending labs today.  GI consulted yesterday for intolerance to feeds regurgitation of gastric contents.  Per recommendations, tube feeds at trickle rate and monitor gastric residuals every 4  hours.  GI considering J-tube extension of PEG.  Continue to communicate with wound care, surgery, and Infectious Disease regarding best plan moving forward.    Past Medical History:   Diagnosis Date    Arthritis     Atrial fibrillation     BPH (benign prostatic hyperplasia)     Cardiac arrest     Coronary artery disease     Cyst, kidney, acquired     Diverticulosis     Hyperlipidemia     Hypertension     MI (myocardial infarction)     Obesity     Steatosis of liver     Stroke        Past Surgical History:   Procedure Laterality Date    A-V CARDIAC PACEMAKER INSERTION Right     CARDIAC CATHETERIZATION      COLONOSCOPY W/ BIOPSIES      CRANIECTOMY Right 12/20/2023    Procedure: CRANIECTOMY;  Surgeon: Artem Can MD;  Location: [facility] OR;  Service: Neurosurgery;  Laterality: Right;    ESOPHAGOGASTRODUODENOSCOPY W/ PEG N/A 1/2/2024    Procedure: PEG;  Surgeon: Tani Day MD;  Location: [facility] ENDOSCOPY;  Service: Gastroenterology;  Laterality: N/A;    excision of colon      TRACHEOSTOMY N/A 12/29/2023    Procedure: CREATION, TRACHEOSTOMY;  Surgeon: Patricia Winslow MD;  Location: [facility] OR;  Service: ENT;  Laterality: N/A;  REQ 1130 //  NEEDS 2 SCRUBS       Social History     Socioeconomic History    Marital status:     Number of children: 9   Occupational History    Occupation: retired   Tobacco Use    Smoking status: Never    Smokeless tobacco: Never   Substance and Sexual Activity    Alcohol use: Not Currently    Drug use: Not Currently    Sexual activity: Not Currently     Partners: Female     Social Drivers of Health     Financial Resource Strain: Patient Unable To Answer (10/21/2024)    Overall Financial Resource Strain (CARDIA)     Difficulty of Paying Living Expenses: Patient unable to answer   Food Insecurity: Patient Unable To Answer (10/21/2024)    Hunger Vital Sign     Worried About Running Out of Food in the Last Year: Patient unable to answer     Ran Out of Food in the Last Year: Patient  unable to answer   Transportation Needs: Patient Unable To Answer (10/21/2024)    TRANSPORTATION NEEDS     Transportation : Patient unable to answer   Physical Activity: Sufficiently Active (8/5/2024)    Exercise Vital Sign     Days of Exercise per Week: 5 days     Minutes of Exercise per Session: 30 min   Stress: Patient Unable To Answer (10/21/2024)    [employer] - Occupational Stress Questionnaire     Feeling of Stress : Patient unable to answer   Housing Stability: Patient Unable To Answer (10/21/2024)    Housing Stability Vital Sign     Unable to Pay for Housing in the Last Year: Patient unable to answer     Homeless in the Last Year: Patient unable to answer       Current Outpatient Medications   Medication Instructions    ascorbic Acid (VITAMIN C) 500 mg, 2 times daily, Per PEG tube    busPIRone (BUSPAR) 5 mg, Per G Tube, 3 times daily    cholestyramine (QUESTRAN) 4 gram packet 4 g, Daily, Via PEG tube    cholestyramine-aspartame (QUESTRAN LIGHT) 4 gram PwPk 4 g, Per G Tube, 2 times daily    diltiaZEM (CARDIZEM) 60 mg, Per G Tube, Every 6 hours    ferrous sulfate 300 mg, Oral, Daily    finasteride (PROSCAR) 5 mg, Oral, Daily    furosemide (LASIX) 80 mg, Per G Tube, As needed (PRN)    L. acidophilus/L.bulgaricus (FLORANEX ORAL) 1 packet, PEG Tube, Daily    levetiracetam 1,500 mg, Per G Tube, 2 times daily, Nursing home reports medication hold by MD until level redraw on Monday.    LIPITOR 10 mg, Per G Tube, Daily    metoclopramide HCl (REGLAN) 10 mg, Per G Tube, 3 times daily before meals    metoprolol tartrate (LOPRESSOR) 100 mg, Per G Tube, 2 times daily    miconazole NITRATE 2 % (MICOTIN) 2 % top powder Topical (Top), 2 times daily    multivitamin (THERAGRAN) per tablet 1 tablet, Per G Tube, Daily    pantoprazole (PROTONIX) 40 mg, Intravenous, 2 times daily    polyethylene glycol (GLYCOLAX) 17 g, Oral, 2 times daily PRN    protein supplement (PROMOD PROTEIN ORAL) 30 mLs, Per G  Tube, Daily    QUEtiapine (SEROQUEL) 25 mg, Per G Tube, 2 times daily    scopolamine (TRANSDERM-SCOP) 1.3-1.5 mg (1 mg over 3 days) 1 patch, Transdermal, Every 3 days    sucralfate (CARAFATE) 1 g, Per G Tube, Before meals & nightly    tamsulosin (FLOMAX) 0.4 mg, Oral, Nightly    venlafaxine 75 mg TR24 1 tablet, Per G Tube, 2 times daily    XARELTO 20 mg Tab 1 tablet, Per G Tube, Nightly       Current Inpatient Medications   atorvastatin  10 mg Per G Tube Daily    linezolid  600 mg Intravenous Q12H    meropenem IV (PEDS and ADULTS)  2 g Intravenous Q8H    metoclopramide HCl  10 mg Oral TID AC    metoprolol tartrate  25 mg Per G Tube TID    pantoprazole  40 mg Intravenous Daily    QUEtiapine  50 mg Per G Tube BID    rivaroxaban  20 mg Per G Tube Nightly    scopolamine  1 patch Transdermal Q3 Days       Current Intravenous Infusions    Review of Systems   Unable to perform ROS: Mental status change        Objective:       Intake/Output Summary (Last 24 hours) at 10/28/2024 0700  Last data filed at 10/28/2024 0520  Gross per 24 hour   Intake 807.74 ml   Output 1900 ml   Net -1092.26 ml         Vital Signs (Most Recent):  Temp: 98.6 °F (37 °C) (10/28/24 0400)  Pulse: 98 (10/28/24 0600)  Resp: 12 (10/28/24 0600)  BP: 112/80 (10/28/24 0600)  SpO2: 100 % (10/28/24 0600)  Body mass index is 22.49 kg/m².  Weight: 69.1 kg (152 lb 5.4 oz) Vital Signs (24h Range):  Temp:  [98.4 °F (36.9 °C)-100 °F (37.8 °C)] 98.6 °F (37 °C)  Pulse:  [] 98  Resp:  [12-33] 12  SpO2:  [94 %-100 %] 100 %  BP: ()/(66-89) 112/80     Physical Exam  Constitutional:       General: He is not in acute distress.     Appearance: He is ill-appearing.      Comments: Thin, Chronically ill-appearing   HENT:      Mouth/Throat:      Mouth: Mucous membranes are dry.   Cardiovascular:      Rate and Rhythm: Tachycardia present. Rhythm irregular.   Abdominal:      General: There is no distension.      Palpations: Abdomen is soft.      Comments: PEG tube in  "place    Musculoskeletal:      Comments: LUE and LLE in contracture    Skin:     General: Skin is warm and dry.      Comments: Large sacral pressure ulcer. BLE with scattered superficial abrasions.    Neurological:      Comments: Non-sedated. Nonverbal at baseline.  Does not follow commands. Winces and withdraws to painful stimuli in RUE and RLE.       Lines/Drains/Airways       Drain  Duration                  Gastrostomy/Enterostomy 01/02/24 1230  days         Urethral Catheter 10/20/24 2210 Silicone 16 Fr. 7 days         Rectal Tube 10/25/24 2030 2 days              Airway  Duration             Adult Surgical Airway 08/19/24 0120 Shiley Extra Large Cuffed Distal 6.0/ 75mm 70 days              Peripheral Intravenous Line  Duration                  Midline Catheter - Single Lumen 10/20/24 1530 Right 7 days         Peripheral IV - Single Lumen 10/20/24 1600 18 G Anterior;Distal;Left Upper Arm 7 days                    Significant Labs:    Lab Results   Component Value Date    WBC 8.21 10/27/2024    HGB 9.2 (L) 10/27/2024    HCT 30.4 (L) 10/27/2024    MCV 85.4 10/27/2024     10/27/2024           BMP  Lab Results   Component Value Date     10/27/2024    K 3.5 10/27/2024    CO2 20 (L) 10/27/2024    BUN 14.3 10/27/2024    CREATININE 0.59 (L) 10/27/2024    CALCIUM 8.0 (L) 10/27/2024    AGAP 16.0 10/27/2024    EGFRNONAA 61 04/23/2022         ABG  No results for input(s): "PH", "PO2", "PCO2", "HCO3", "POCBASEDEF" in the last 168 hours.      Mechanical Ventilation Support:  Vent Mode: A/C (10/28/24 0513)  Ventilator Initiated: Yes (10/20/24 1546)  Set Rate: 20 BPM (10/28/24 0513)  Vt Set: 500 mL (10/28/24 0513)  PEEP/CPAP: 2 cmH20 (10/28/24 0513)  Oxygen Concentration (%): 30 (10/28/24 0513)  Peak Airway Pressure: 19 cmH20 (10/28/24 0513)  Total Ve: 9.6 L/m (10/28/24 0513)  F/VT Ratio<105 (RSBI): (!) 69.89 (10/28/24 0513)    Significant Imaging:  I have reviewed the pertinent imaging within the past 24 " hours.    CT Abdomen Pelvis With IV Contrast NO Oral Contrast  Result Date: 10/21/2024  Interval development of sacral decubitus just to the right of the superior aspect of the vertebral fold.  No abscess or fluid collection is seen Findings seen consistent with urinary bladder wall thickening and inflammatory changes consistent with cystitis Interval development of bilateral patchy areas of pneumonia with developing consolidation in the lower lobes Electronically signed by: Erick Grant Date:    10/21/2024 Time:    17:22      Assessment/Plan:     Assessment  Sepsis  VRE Enterococcus and ESBL Klebsiella bacteremia  Klebsiella pneumoniae and Proteus mirabilis pneumonia  UTI  Afib with RVR  Hypernatremia   Sacral wound    Plan  Admitted to ICU   On mechanical ventilation via trach   CIS following, patient received digoxin on 10/21/2024. Continue lopressor 25 mg TID  Echocardiogram with no evidence of endocarditis.  Blood cultures on 10/22/2024 no growth at 5 days   Urine culture with no growth to date, blood and respiratory cultures as above  ID following, discontinued cefepime and dapto on 10/23, continue merrem (10/23-11/6) and linezolid (10/22-11/5)  Wound care and General surgery consulted; appreciate further recommendations  Palliative care consulted    DVT Prophylaxis: SCDs, continue home Xarelto   GI Prophylaxis: PPI     32 minutes of critical care was time spent personally by me on the following activities: development of treatment plan with patient or surrogate and bedside caregivers, discussions with consultants, evaluation of patient's response to treatment, examination of patient, ordering and performing treatments and interventions, ordering and review of laboratory studies, ordering and review of radiographic studies, pulse oximetry, re-evaluation of patient's condition.  This critical care time did not overlap with that of any other provider or involve time for any procedures.     Gasper Ceballos,  DO  Pulmonary Critical Care Medicine  [facility]  DOS: 10/28/2024

## 2024-10-29 ENCOUNTER — ANESTHESIA EVENT (OUTPATIENT)
Dept: ENDOSCOPY | Facility: HOSPITAL | Age: 69
End: 2024-10-29
Payer: MEDICARE

## 2024-10-29 ENCOUNTER — ANESTHESIA (OUTPATIENT)
Dept: ENDOSCOPY | Facility: HOSPITAL | Age: 69
End: 2024-10-29
Payer: MEDICARE

## 2024-10-29 LAB
ALBUMIN SERPL-MCNC: 2.2 G/DL (ref 3.4–4.8)
ALBUMIN/GLOB SERPL: 0.5 RATIO (ref 1.1–2)
ALP SERPL-CCNC: 117 UNIT/L (ref 40–150)
ALT SERPL-CCNC: 11 UNIT/L (ref 0–55)
ANION GAP SERPL CALC-SCNC: 14 MEQ/L
AST SERPL-CCNC: 19 UNIT/L (ref 5–34)
BASOPHILS # BLD AUTO: 0.05 X10(3)/MCL
BASOPHILS NFR BLD AUTO: 0.6 %
BILIRUB SERPL-MCNC: 1.1 MG/DL
BUN SERPL-MCNC: 7 MG/DL (ref 8.4–25.7)
CALCIUM SERPL-MCNC: 8.3 MG/DL (ref 8.8–10)
CHLORIDE SERPL-SCNC: 106 MMOL/L (ref 98–107)
CO2 SERPL-SCNC: 22 MMOL/L (ref 23–31)
CREAT SERPL-MCNC: 0.58 MG/DL (ref 0.72–1.25)
CREAT/UREA NIT SERPL: 12
EOSINOPHIL # BLD AUTO: 0.08 X10(3)/MCL (ref 0–0.9)
EOSINOPHIL NFR BLD AUTO: 1 %
ERYTHROCYTE [DISTWIDTH] IN BLOOD BY AUTOMATED COUNT: 16.4 % (ref 11.5–17)
GFR SERPLBLD CREATININE-BSD FMLA CKD-EPI: >60 ML/MIN/1.73/M2
GLOBULIN SER-MCNC: 4.3 GM/DL (ref 2.4–3.5)
GLUCOSE SERPL-MCNC: 61 MG/DL (ref 82–115)
GLUCOSE SERPL-MCNC: 88 MG/DL (ref 70–110)
GLUCOSE SERPL-MCNC: 96 MG/DL (ref 70–110)
HCT VFR BLD AUTO: 30.5 % (ref 42–52)
HGB BLD-MCNC: 9.4 G/DL (ref 14–18)
IMM GRANULOCYTES # BLD AUTO: 0.06 X10(3)/MCL (ref 0–0.04)
IMM GRANULOCYTES NFR BLD AUTO: 0.7 %
LYMPHOCYTES # BLD AUTO: 2.46 X10(3)/MCL (ref 0.6–4.6)
LYMPHOCYTES NFR BLD AUTO: 30 %
MCH RBC QN AUTO: 25.5 PG (ref 27–31)
MCHC RBC AUTO-ENTMCNC: 30.8 G/DL (ref 33–36)
MCV RBC AUTO: 82.7 FL (ref 80–94)
MONOCYTES # BLD AUTO: 1.94 X10(3)/MCL (ref 0.1–1.3)
MONOCYTES NFR BLD AUTO: 23.7 %
NEUTROPHILS # BLD AUTO: 3.61 X10(3)/MCL (ref 2.1–9.2)
NEUTROPHILS NFR BLD AUTO: 44 %
NRBC BLD AUTO-RTO: 0 %
PLATELET # BLD AUTO: 257 X10(3)/MCL (ref 130–400)
PMV BLD AUTO: 10.3 FL (ref 7.4–10.4)
POCT GLUCOSE: 101 MG/DL (ref 70–110)
POCT GLUCOSE: 112 MG/DL (ref 70–110)
POCT GLUCOSE: 46 MG/DL (ref 70–110)
POCT GLUCOSE: 80 MG/DL (ref 70–110)
POCT GLUCOSE: 88 MG/DL (ref 70–110)
POCT GLUCOSE: 96 MG/DL (ref 70–110)
POTASSIUM SERPL-SCNC: 2.9 MMOL/L (ref 3.5–5.1)
PROT SERPL-MCNC: 6.5 GM/DL (ref 5.8–7.6)
RBC # BLD AUTO: 3.69 X10(6)/MCL (ref 4.7–6.1)
SODIUM SERPL-SCNC: 142 MMOL/L (ref 136–145)
WBC # BLD AUTO: 8.2 X10(3)/MCL (ref 4.5–11.5)

## 2024-10-29 PROCEDURE — 25000003 PHARM REV CODE 250

## 2024-10-29 PROCEDURE — 94003 VENT MGMT INPAT SUBQ DAY: CPT

## 2024-10-29 PROCEDURE — 99900026 HC AIRWAY MAINTENANCE (STAT)

## 2024-10-29 PROCEDURE — 85025 COMPLETE CBC W/AUTO DIFF WBC: CPT | Performed by: INTERNAL MEDICINE

## 2024-10-29 PROCEDURE — 27200966 HC CLOSED SUCTION SYSTEM

## 2024-10-29 PROCEDURE — 25000003 PHARM REV CODE 250: Performed by: NURSE PRACTITIONER

## 2024-10-29 PROCEDURE — 36415 COLL VENOUS BLD VENIPUNCTURE: CPT | Performed by: INTERNAL MEDICINE

## 2024-10-29 PROCEDURE — 25000003 PHARM REV CODE 250: Performed by: HOSPITALIST

## 2024-10-29 PROCEDURE — 63600175 PHARM REV CODE 636 W HCPCS: Performed by: GENERAL PRACTICE

## 2024-10-29 PROCEDURE — 25000003 PHARM REV CODE 250: Performed by: GENERAL PRACTICE

## 2024-10-29 PROCEDURE — 99900031 HC PATIENT EDUCATION (STAT)

## 2024-10-29 PROCEDURE — 80053 COMPREHEN METABOLIC PANEL: CPT | Performed by: INTERNAL MEDICINE

## 2024-10-29 PROCEDURE — 27100171 HC OXYGEN HIGH FLOW UP TO 24 HOURS

## 2024-10-29 PROCEDURE — 94761 N-INVAS EAR/PLS OXIMETRY MLT: CPT

## 2024-10-29 PROCEDURE — 63600175 PHARM REV CODE 636 W HCPCS

## 2024-10-29 PROCEDURE — 94760 N-INVAS EAR/PLS OXIMETRY 1: CPT

## 2024-10-29 PROCEDURE — 27000207 HC ISOLATION

## 2024-10-29 PROCEDURE — 20000000 HC ICU ROOM

## 2024-10-29 PROCEDURE — 99900035 HC TECH TIME PER 15 MIN (STAT)

## 2024-10-29 RX ORDER — POTASSIUM CHLORIDE 14.9 MG/ML
20 INJECTION INTRAVENOUS
Status: COMPLETED | OUTPATIENT
Start: 2024-10-29 | End: 2024-10-29

## 2024-10-29 RX ORDER — NOREPINEPHRINE BITARTRATE/D5W 8 MG/250ML
PLASTIC BAG, INJECTION (ML) INTRAVENOUS
Status: DISPENSED
Start: 2024-10-29 | End: 2024-10-29

## 2024-10-29 RX ADMIN — SODIUM CHLORIDE 2 G: 9 INJECTION, SOLUTION INTRAVENOUS at 06:10

## 2024-10-29 RX ADMIN — RIVAROXABAN 20 MG: 10 TABLET, FILM COATED ORAL at 09:10

## 2024-10-29 RX ADMIN — SODIUM CHLORIDE 1000 ML: 9 INJECTION, SOLUTION INTRAVENOUS at 11:10

## 2024-10-29 RX ADMIN — SODIUM CHLORIDE 2 G: 9 INJECTION, SOLUTION INTRAVENOUS at 12:10

## 2024-10-29 RX ADMIN — ATORVASTATIN CALCIUM 10 MG: 10 TABLET, FILM COATED ORAL at 09:10

## 2024-10-29 RX ADMIN — PANTOPRAZOLE SODIUM 40 MG: 40 INJECTION, POWDER, LYOPHILIZED, FOR SOLUTION INTRAVENOUS at 09:10

## 2024-10-29 RX ADMIN — LINEZOLID 600 MG: 600 INJECTION, SOLUTION INTRAVENOUS at 09:10

## 2024-10-29 RX ADMIN — QUETIAPINE FUMARATE 50 MG: 25 TABLET ORAL at 09:10

## 2024-10-29 RX ADMIN — GUAIFENESIN AND DEXTROMETHORPHAN 10 ML: 100; 10 SYRUP ORAL at 05:10

## 2024-10-29 RX ADMIN — POTASSIUM BICARBONATE 40 MEQ: 391 TABLET, EFFERVESCENT ORAL at 05:10

## 2024-10-29 RX ADMIN — SODIUM CHLORIDE 2 G: 9 INJECTION, SOLUTION INTRAVENOUS at 03:10

## 2024-10-29 RX ADMIN — POTASSIUM CHLORIDE 20 MEQ: 14.9 INJECTION INTRAVENOUS at 05:10

## 2024-10-29 RX ADMIN — ACETAMINOPHEN 650 MG: 325 TABLET ORAL at 09:10

## 2024-10-29 RX ADMIN — METOPROLOL TARTRATE 25 MG: 25 TABLET, FILM COATED ORAL at 09:10

## 2024-10-29 RX ADMIN — POTASSIUM CHLORIDE 20 MEQ: 14.9 INJECTION INTRAVENOUS at 09:10

## 2024-10-29 NOTE — PROGRESS NOTES
[facility] - Emergency Dept  Pulmonary Critical Care Note    Patient Name: Delio Daniel Jr.  MRN: [id number]  Admission Date: 10/20/2024  Hospital Length of Stay: 9 days  Code Status: Full Code  Attending Provider: Itz Francisco MD  Primary Care Provider: Jl Briones MD     Subjective:     HPI:   Patient is a 68 y/o male with extensive PMH who presented from NH on 10/20/24 with decreased responsiveness, severe sepsis, and recurrent UTI. 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     Patient transferred to ED from [facility] with lethargy and tachycardia. Family at bedside reports patient is nonverbal at baseline but typically more alert. In the ED, patient is febrile, tachycardic with -190s, tachypneic, /60. EKG shows Afib with RVR. Diltiazem drip started in ED. Labs are significant for WBC of 16.5, lactic acid of 3.3, Cr 1.48, BUN 45.3, Na 155, K+ 5.1, troponin elevated at 0.118. UA with evidence of UTI. Of note, pt was being treated outpatient for a UTI, currently on day 4 of 7 day Rocephin course. Urine culture 10/16/24 with multidrug resistant Klebsiella pneumonia and Proteus mirabilis with susceptibility to Cefepime. Patient received 3L of NS and initiated on Vanc and Cefepime in ED. Admitted to the ICU on mechanical ventilation via trach.    Hospital Course/Significant events:  10/20/24: Admitted to ICU for severe sepsis     24 Hour Interval History:  No acute events overnight.  Labs this am with K 2.9. Repleted. GI consulted for intolerance to feeds regurgitation of gastric contents.  Per recommendations, hold tube feeds and continue PPI.  GI to do J-tube extension of  PEG today at bedside.  Continue to communicate with wound care, surgery, and Infectious Disease regarding best plan moving forward.    Past Medical History:   Diagnosis Date    Arthritis     Atrial fibrillation     BPH (benign prostatic hyperplasia)     Cardiac arrest     Coronary artery disease     Cyst, kidney, acquired     Diverticulosis     Hyperlipidemia     Hypertension     MI (myocardial infarction)     Obesity     Steatosis of liver     Stroke        Past Surgical History:   Procedure Laterality Date    A-V CARDIAC PACEMAKER INSERTION Right     CARDIAC CATHETERIZATION      COLONOSCOPY W/ BIOPSIES      CRANIECTOMY Right 12/20/2023    Procedure: CRANIECTOMY;  Surgeon: Artem Can MD;  Location: [facility] OR;  Service: Neurosurgery;  Laterality: Right;    ESOPHAGOGASTRODUODENOSCOPY W/ PEG N/A 1/2/2024    Procedure: PEG;  Surgeon: Tani Day MD;  Location: [facility] ENDOSCOPY;  Service: Gastroenterology;  Laterality: N/A;    excision of colon      TRACHEOSTOMY N/A 12/29/2023    Procedure: CREATION, TRACHEOSTOMY;  Surgeon: Patricia Winslow MD;  Location: [facility] OR;  Service: ENT;  Laterality: N/A;  REQ 1130 //  NEEDS 2 SCRUBS       Social History     Socioeconomic History    Marital status:     Number of children: 9   Occupational History    Occupation: retired   Tobacco Use    Smoking status: Never    Smokeless tobacco: Never   Substance and Sexual Activity    Alcohol use: Not Currently    Drug use: Not Currently    Sexual activity: Not Currently     Partners: Female     Social Drivers of Health     Financial Resource Strain: Patient Unable To Answer (10/21/2024)    Overall Financial Resource Strain (CARDIA)     Difficulty of Paying Living Expenses: Patient unable to answer   Food Insecurity: Patient Unable To Answer (10/21/2024)    Hunger Vital Sign     Worried About Running Out of Food in the Last Year: Patient unable to answer     Ran Out of Food in the Last Year: Patient unable to answer    Transportation Needs: Patient Unable To Answer (10/21/2024)    TRANSPORTATION NEEDS     Transportation : Patient unable to answer   Physical Activity: Sufficiently Active (8/5/2024)    Exercise Vital Sign     Days of Exercise per Week: 5 days     Minutes of Exercise per Session: 30 min   Stress: Patient Unable To Answer (10/21/2024)    [employer] - Occupational Stress Questionnaire     Feeling of Stress : Patient unable to answer   Housing Stability: Patient Unable To Answer (10/21/2024)    Housing Stability Vital Sign     Unable to Pay for Housing in the Last Year: Patient unable to answer     Homeless in the Last Year: Patient unable to answer       Current Outpatient Medications   Medication Instructions    ascorbic Acid (VITAMIN C) 500 mg, 2 times daily, Per PEG tube    busPIRone (BUSPAR) 5 mg, Per G Tube, 3 times daily    cholestyramine (QUESTRAN) 4 gram packet 4 g, Daily, Via PEG tube    cholestyramine-aspartame (QUESTRAN LIGHT) 4 gram PwPk 4 g, Per G Tube, 2 times daily    diltiaZEM (CARDIZEM) 60 mg, Per G Tube, Every 6 hours    ferrous sulfate 300 mg, Oral, Daily    finasteride (PROSCAR) 5 mg, Oral, Daily    furosemide (LASIX) 80 mg, Per G Tube, As needed (PRN)    L. acidophilus/L.bulgaricus (FLORANEX ORAL) 1 packet, PEG Tube, Daily    levetiracetam 1,500 mg, Per G Tube, 2 times daily, Nursing home reports medication hold by MD until level redraw on Monday.    LIPITOR 10 mg, Per G Tube, Daily    metoclopramide HCl (REGLAN) 10 mg, Per G Tube, 3 times daily before meals    metoprolol tartrate (LOPRESSOR) 100 mg, Per G Tube, 2 times daily    miconazole NITRATE 2 % (MICOTIN) 2 % top powder Topical (Top), 2 times daily    multivitamin (THERAGRAN) per tablet 1 tablet, Per G Tube, Daily    pantoprazole (PROTONIX) 40 mg, Intravenous, 2 times daily    polyethylene glycol (GLYCOLAX) 17 g, Oral, 2 times daily PRN    protein supplement (PROMOD PROTEIN ORAL) 30 mLs, Per G Tube, Daily     QUEtiapine (SEROQUEL) 25 mg, Per G Tube, 2 times daily    scopolamine (TRANSDERM-SCOP) 1.3-1.5 mg (1 mg over 3 days) 1 patch, Transdermal, Every 3 days    sucralfate (CARAFATE) 1 g, Per G Tube, Before meals & nightly    tamsulosin (FLOMAX) 0.4 mg, Oral, Nightly    venlafaxine 75 mg TR24 1 tablet, Per G Tube, 2 times daily    XARELTO 20 mg Tab 1 tablet, Per G Tube, Nightly       Current Inpatient Medications   atorvastatin  10 mg Per G Tube Daily    linezolid  600 mg Intravenous Q12H    meropenem IV (PEDS and ADULTS)  2 g Intravenous Q8H    metoprolol tartrate  25 mg Per G Tube TID    pantoprazole  40 mg Intravenous Daily    potassium chloride in water  20 mEq Intravenous Q2H    QUEtiapine  50 mg Per G Tube BID    rivaroxaban  20 mg Per G Tube Nightly    scopolamine  1 patch Transdermal Q3 Days       Current Intravenous Infusions    Review of Systems   Unable to perform ROS: Mental status change        Objective:       Intake/Output Summary (Last 24 hours) at 10/29/2024 0925  Last data filed at 10/29/2024 0759  Gross per 24 hour   Intake 1540.3 ml   Output 3575 ml   Net -2034.7 ml         Vital Signs (Most Recent):  Temp: 98.2 °F (36.8 °C) (10/29/24 0400)  Pulse: 97 (10/29/24 0745)  Resp: 15 (10/29/24 0745)  BP: (!) 134/95 (10/29/24 0700)  SpO2: 100 % (10/29/24 0832)  Body mass index is 22.49 kg/m².  Weight: 69.1 kg (152 lb 5.4 oz) Vital Signs (24h Range):  Temp:  [98.2 °F (36.8 °C)-99 °F (37.2 °C)] 98.2 °F (36.8 °C)  Pulse:  [] 97  Resp:  [15-30] 15  SpO2:  [74 %-100 %] 100 %  BP: (106-143)/() 134/95     Physical Exam  Constitutional:       General: He is not in acute distress.     Appearance: He is ill-appearing.      Comments: Thin, Chronically ill-appearing   HENT:      Mouth/Throat:      Mouth: Mucous membranes are dry.   Cardiovascular:      Rate and Rhythm: Tachycardia present. Rhythm irregular.   Abdominal:      General: There is no distension.      Palpations: Abdomen is soft.      Comments: PEG  "tube in place    Musculoskeletal:      Comments: LUE and LLE in contracture    Skin:     General: Skin is warm and dry.      Comments: Large sacral pressure ulcer. BLE with scattered superficial abrasions.    Neurological:      Comments: Non-sedated. Nonverbal at baseline.  Does not follow commands. Winces and withdraws to painful stimuli in RUE and RLE.       Lines/Drains/Airways       Drain  Duration                  Gastrostomy/Enterostomy 01/02/24 1230  days         Urethral Catheter 10/20/24 2210 Silicone 16 Fr. 8 days         Rectal Tube 10/25/24 2030 3 days              Airway  Duration             Adult Surgical Airway 08/19/24 0120 Shiley Extra Large Cuffed Distal 6.0/ 75mm 71 days              Peripheral Intravenous Line  Duration                  Midline Catheter - Single Lumen 10/20/24 1530 Right 8 days         Peripheral IV - Single Lumen 10/20/24 1600 18 G Anterior;Distal;Left Upper Arm 8 days                    Significant Labs:    Lab Results   Component Value Date    WBC 8.20 10/29/2024    HGB 9.4 (L) 10/29/2024    HCT 30.5 (L) 10/29/2024    MCV 82.7 10/29/2024     10/29/2024           BMP  Lab Results   Component Value Date     10/29/2024    K 2.9 (L) 10/29/2024    CO2 22 (L) 10/29/2024    BUN 7.0 (L) 10/29/2024    CREATININE 0.58 (L) 10/29/2024    CALCIUM 8.3 (L) 10/29/2024    AGAP 14.0 10/29/2024    EGFRNONAA 61 04/23/2022         ABG  No results for input(s): "PH", "PO2", "PCO2", "HCO3", "POCBASEDEF" in the last 168 hours.      Mechanical Ventilation Support:  Vent Mode: A/C (10/29/24 0832)  Ventilator Initiated: Yes (10/20/24 1546)  Set Rate: 20 BPM (10/29/24 0832)  Vt Set: 500 mL (10/29/24 0832)  PEEP/CPAP: 5 cmH20 (10/29/24 0832)  Oxygen Concentration (%): 30 (10/29/24 0832)  Peak Airway Pressure: 19 cmH20 (10/29/24 0832)  Total Ve: 6.7 L/m (10/29/24 0832)  F/VT Ratio<105 (RSBI): (!) 62.89 (10/29/24 0500)    Significant Imaging:  I have reviewed the pertinent imaging " within the past 24 hours.    CT Abdomen Pelvis With IV Contrast NO Oral Contrast  Result Date: 10/21/2024  Interval development of sacral decubitus just to the right of the superior aspect of the vertebral fold.  No abscess or fluid collection is seen Findings seen consistent with urinary bladder wall thickening and inflammatory changes consistent with cystitis Interval development of bilateral patchy areas of pneumonia with developing consolidation in the lower lobes Electronically signed by: Erick Grant Date:    10/21/2024 Time:    17:22      Assessment/Plan:     Assessment  Sepsis  VRE Enterococcus and ESBL Klebsiella bacteremia  Klebsiella pneumoniae and Proteus mirabilis pneumonia  UTI  Afib with RVR  Hypernatremia   Sacral wound    Plan  Admitted to ICU   On mechanical ventilation via trach   CIS following, patient received digoxin on 10/21/2024. Continue lopressor 25 mg TID  Echocardiogram with no evidence of endocarditis.  Blood cultures on 10/22/2024 no growth at 5 days   Urine culture with no growth to date, blood and respiratory cultures as above  ID following, discontinued cefepime and dapto on 10/23, continue merrem (10/23-11/6) and linezolid (10/22-11/5)  Wound care and General surgery consulted; appreciate further recommendations  Palliative care consulted    DVT Prophylaxis: SCDs, continue home Xarelto   GI Prophylaxis: PPI     32 minutes of critical care was time spent personally by me on the following activities: development of treatment plan with patient or surrogate and bedside caregivers, discussions with consultants, evaluation of patient's response to treatment, examination of patient, ordering and performing treatments and interventions, ordering and review of laboratory studies, ordering and review of radiographic studies, pulse oximetry, re-evaluation of patient's condition.  This critical care time did not overlap with that of any other provider or involve time for any procedures.      Kelle Cardenas MD  Pulmonary Critical Care Medicine  [facility] - Emergency Dept  DOS: 10/29/2024

## 2024-10-29 NOTE — PROGRESS NOTES
Inpatient Nutrition Assessment    Admit Date: 10/20/2024   Total duration of encounter: 9 days   Patient Age: 69 y.o.    Nutrition Recommendation/Prescription     Restart tube feeding when appropriate.  Tube feeding recommendation:     Impact Peptide 1.5 goal rate 70 ml/hr to provide  2100 kcal/d  (111% est needs)  131 g protein/d (100% est needs)  1078 ml free water/d (52% est needs)  (calculations based on estimated 20 hr/d run time)     If no IV fluids running, can give 100ml q 2hr water flushes. Total water provided: 2078ml (100% est needs.)     Patel (provides 90 kcal, 2.5 g protein per serving) BID.    Communication of Recommendations: reviewed with nurse    Nutrition Assessment     Malnutrition Assessment/Nutrition-Focused Physical Exam    Malnutrition Context: chronic illness (10/21/24 1028)  Malnutrition Level: severe (10/21/24 1028)  Energy Intake (Malnutrition):  (unable to eval) (10/21/24 1028)  Weight Loss (Malnutrition): greater than 10% in 6 months (10/21/24 1028)  Subcutaneous Fat (Malnutrition): severe depletion (10/21/24 1028)  Orbital Region (Subcutaneous Fat Loss): severe depletion  Upper Arm Region (Subcutaneous Fat Loss): severe depletion     Muscle Mass (Malnutrition): severe depletion (10/21/24 1028)     Clavicle Bone Region (Muscle Loss): severe depletion  Clavicle and Acromion Bone Region (Muscle Loss): severe depletion  Scapular Bone Region (Muscle Loss): severe depletion              Fluid Accumulation (Malnutrition):  (does not meet criteria) (10/21/24 1028)        A minimum of two characteristics is recommended for diagnosis of either severe or non-severe malnutrition.    Chart Review    Reason Seen: continuous nutrition monitoring and physician consult for TF    Malnutrition Screening Tool Results   Have you recently lost weight without trying?: Unsure  Have you been eating poorly because of a decreased appetite?: No   MST Score: 2   Diagnosis:  Severe sepsis   UTI  Afib with  RVR  Hypernatremia   Sacral wound    Relevant Medical History:    Arthritis      Atrial fibrillation      BPH (benign prostatic hyperplasia)      Cardiac arrest      Coronary artery disease      Cyst, kidney, acquired      Diverticulosis      Hyperlipidemia      Hypertension      MI (myocardial infarction)      Obesity      Steatosis of liver      Stroke      Scheduled Medications:  atorvastatin, 10 mg, Daily  linezolid, 600 mg, Q12H  meropenem IV (PEDS and ADULTS), 2 g, Q8H  metoprolol tartrate, 25 mg, TID  pantoprazole, 40 mg, Daily  QUEtiapine, 50 mg, BID  rivaroxaban, 20 mg, Nightly  scopolamine, 1 patch, Q3 Days    Continuous Infusions:     PRN Medications:  acetaminophen, 650 mg, Q6H PRN  dextromethorphan-guaiFENesin  mg/5 ml, 10 mL, Q4H PRN  dextrose 10%, 12.5 g, PRN  dextrose 10%, 25 g, PRN  glucagon (human recombinant), 1 mg, PRN  hydrALAZINE, 10 mg, Q4H PRN  metoclopramide HCl, 10 mg, Q8H PRN  metoprolol, 5 mg, Q5 Min PRN  naloxone, 0.02 mg, PRN  sodium chloride 0.9%, 10 mL, Q12H PRN    Calorie Containing IV Medications: no significant kcals from medications at this time    Recent Labs   Lab 10/23/24  0337 10/24/24  0304 10/25/24  0304 10/26/24  0222 10/26/24  0642 10/26/24  1622 10/27/24  0348 10/28/24  0711 10/29/24  0335   * 153* 150*  --  147* 143 144 144 142   K 3.3* 3.6 3.6  --  3.5 3.0* 3.5 3.2* 2.9*   CALCIUM 8.1* 7.2* 7.7*  --  7.8* 8.0* 8.0* 7.7* 8.3*   MG 2.40 2.30 2.20  --  2.00  --  1.90 1.80  --    * 123* 116*  --  112* 109* 108* 108* 106   CO2 18* 19* 19*  --  23 25 20* 25 22*   BUN 23.2 16.7 15.3  --  13.6 11.7 14.3 11.3 7.0*   CREATININE 0.82 0.71* 0.70*  --  0.57* 0.58* 0.59* 0.61* 0.58*   EGFRNORACEVR >60 >60 >60  --  >60 >60 >60 >60 >60   GLUCOSE 84 71* 71*  --  48* 105 35* 68* 61*   BILITOT 0.7 0.7 0.6  --  0.8 0.7 0.9 0.9 1.1   ALKPHOS 94 94 104  --  102 100 109 108 117   ALT 8 8 11  --  12 13 12 11 11   AST 13 12 21  --  18 16 19 19 19   ALBUMIN 1.8* 1.8* 2.0*  --   "2.1* 2.0* 2.2* 2.1* 2.2*   WBC 9.83 11.61* 12.27* 14.39  14.39*  --   --  8.21 7.39 8.20   HGB 8.2* 7.9* 9.2* 9.7*  --   --  9.2* 8.6* 9.4*   HCT 29.0* 26.3* 30.6* 32.4*  --   --  30.4* 27.3* 30.5*     Nutrition Orders:  Diet NPO  Tube Feedings/Formulas 70; 1,400; Impact Peptide 1.5; Peg; 100; Every 2 hours,Tube Feedings/Formulas Other (see comments); Peg; Patel - Orange    Appetite/Oral Intake: not applicable/not applicable  Factors Affecting Nutritional Intake: on mechanical ventilation and tracheostomy  Social Needs Impacting Access to Food: none identified (from NH)  Food/Zoroastrianism/Cultural Preferences: unable to obtain  Food Allergies: no known food allergies  Last Bowel Movement: 10/28/24  Wound(s):[REMOVED]      Altered Skin Integrity 24 1100 lower Buttocks Moisture associated dermatitis-Tissue loss description: Full thickness       Wound 24 1420 Pressure Injury Left Knee-Tissue loss description: Partial thickness       Wound 10/20/24 2100 Pressure Injury Sacral spine-Tissue loss description: Full thickness       Wound 24 1500 Pressure Injury Left lateral Ankle-Tissue loss description: Partial thickness     Comments    10/21/24: Pt with previous EMR wts indicating wt loss. On NH TF. Also with increased protein needs due to wounds.     10/25/24: TF on hold. Pt continues with vomiting. On Reglan.     10/29/24: TF still off. Plans for gtube extension into jejunum.     Anthropometrics    Height: 5' 9.02" (175.3 cm), Height Method: Estimated  Last Weight: 69.1 kg (152 lb 5.4 oz) (10/21/24 1026), Weight Method: Bed Scale  BMI (Calculated): 22.5  BMI Classification: normal (BMI 18.5-24.9)        Ideal Body Weight (IBW), Male: 160.12 lb     % Ideal Body Weight, Male (lb): 95.21 %                 Usual Body Weight (UBW), k kg (per EMR from 6/25/24)  % Usual Body Weight: 76.94  % Weight Change From Usual Weight: -23.22 %  Usual Weight Provided By: EMR weight history    Wt Readings from Last 5 " Encounters:   10/21/24 69.1 kg (152 lb 5.4 oz)   08/19/24 90.7 kg (199 lb 15.3 oz)   08/03/24 117.9 kg (260 lb)   07/18/24 117.9 kg (260 lb)   06/25/24 90.7 kg (200 lb)     Weight Change(s) Since Admission:   Wt Readings from Last 1 Encounters:   10/21/24 1026 69.1 kg (152 lb 5.4 oz)   10/21/24 0634 69.1 kg (152 lb 5.4 oz)   10/20/24 1535 63.5 kg (140 lb)   Admit Weight: 63.5 kg (140 lb) (10/20/24 1535), Weight Method: Estimated    Estimated Needs    Weight Used For Calorie Calculations: 69.1 kg (152 lb 5.4 oz)  Energy Calorie Requirements (kcal): 1886kcal (1.3 stress factor)  Energy Need Method: [place]-St Jeor  Weight Used For Protein Calculations: 69.1 kg (152 lb 5.4 oz)  Protein Requirements: 124-138gm (1.8-2g/kg)  Fluid Requirements (mL): 2073ml (30ml/kg)  CHO Requirement: 210gm (45% est kcal needs)     Enteral Nutrition     Patient not receiving enteral nutrition at this time.    Parenteral Nutrition     Patient not receiving parenteral nutrition support at this time.    Evaluation of Received Nutrient Intake    Calories: not meeting estimated needs  Protein: not meeting estimated needs    Patient Education     Not applicable.    Nutrition Diagnosis     PES: Inadequate oral intake related to chronic illness as evidenced by trach/PEG. (active)     PES: Severe chronic disease or condition related malnutrition related to chronic illness as evidenced by severe fat depletion, severe muscle depletion, and greater than 10% weight loss in 6 months. (active)    Nutrition Interventions     Intervention(s): collaboration with other providers    Goal: Meet greater than 80% of nutritional needs by follow-up. (goal progressing)  Goal: Tolerate enteral feeding at goal rate by follow-up. (goal progressing)    Nutrition Goals & Monitoring     Dietitian will monitor: energy intake and enteral nutrition intake  Discharge planning: too early to determine; pending clinical course  Nutrition Risk/Follow-Up: high (follow-up in 1-4  days)   Please consult if re-assessment needed sooner.

## 2024-10-29 NOTE — PLAN OF CARE
GI Plan of Care    Patient with episode of hypotension.  EGD with extension of J-tube cancelled.  Will tentatively reschedule for tomorrow pending improvement in BP.      TRUMAN Pabon

## 2024-10-30 VITALS
OXYGEN SATURATION: 100 % | RESPIRATION RATE: 21 BRPM | DIASTOLIC BLOOD PRESSURE: 75 MMHG | SYSTOLIC BLOOD PRESSURE: 122 MMHG | HEART RATE: 83 BPM

## 2024-10-30 LAB
ALBUMIN SERPL-MCNC: 2.3 G/DL (ref 3.4–4.8)
ALBUMIN/GLOB SERPL: 0.5 RATIO (ref 1.1–2)
ALP SERPL-CCNC: 120 UNIT/L (ref 40–150)
ALT SERPL-CCNC: 10 UNIT/L (ref 0–55)
ANION GAP SERPL CALC-SCNC: 12 MEQ/L
AST SERPL-CCNC: 18 UNIT/L (ref 5–34)
BASOPHILS # BLD AUTO: 0.06 X10(3)/MCL
BASOPHILS NFR BLD AUTO: 0.7 %
BILIRUB SERPL-MCNC: 0.9 MG/DL
BUN SERPL-MCNC: 5.8 MG/DL (ref 8.4–25.7)
CALCIUM SERPL-MCNC: 8.2 MG/DL (ref 8.8–10)
CHLORIDE SERPL-SCNC: 108 MMOL/L (ref 98–107)
CO2 SERPL-SCNC: 20 MMOL/L (ref 23–31)
CREAT SERPL-MCNC: 0.58 MG/DL (ref 0.72–1.25)
CREAT/UREA NIT SERPL: 10
EOSINOPHIL # BLD AUTO: 0.13 X10(3)/MCL (ref 0–0.9)
EOSINOPHIL NFR BLD AUTO: 1.5 %
ERYTHROCYTE [DISTWIDTH] IN BLOOD BY AUTOMATED COUNT: 16.8 % (ref 11.5–17)
GFR SERPLBLD CREATININE-BSD FMLA CKD-EPI: >60 ML/MIN/1.73/M2
GLOBULIN SER-MCNC: 4.3 GM/DL (ref 2.4–3.5)
GLUCOSE SERPL-MCNC: 42 MG/DL (ref 82–115)
HCT VFR BLD AUTO: 29.5 % (ref 42–52)
HGB BLD-MCNC: 9.4 G/DL (ref 14–18)
IMM GRANULOCYTES # BLD AUTO: 0.11 X10(3)/MCL (ref 0–0.04)
IMM GRANULOCYTES NFR BLD AUTO: 1.3 %
LYMPHOCYTES # BLD AUTO: 2.33 X10(3)/MCL (ref 0.6–4.6)
LYMPHOCYTES NFR BLD AUTO: 27.6 %
MCH RBC QN AUTO: 26.3 PG (ref 27–31)
MCHC RBC AUTO-ENTMCNC: 31.9 G/DL (ref 33–36)
MCV RBC AUTO: 82.6 FL (ref 80–94)
MONOCYTES # BLD AUTO: 2.11 X10(3)/MCL (ref 0.1–1.3)
MONOCYTES NFR BLD AUTO: 25 %
NEUTROPHILS # BLD AUTO: 3.71 X10(3)/MCL (ref 2.1–9.2)
NEUTROPHILS NFR BLD AUTO: 43.9 %
NRBC BLD AUTO-RTO: 0.2 %
PLATELET # BLD AUTO: 250 X10(3)/MCL (ref 130–400)
PMV BLD AUTO: 10.3 FL (ref 7.4–10.4)
POCT GLUCOSE: 30 MG/DL (ref 70–110)
POCT GLUCOSE: 56 MG/DL (ref 70–110)
POCT GLUCOSE: 69 MG/DL (ref 70–110)
POCT GLUCOSE: 82 MG/DL (ref 70–110)
POCT GLUCOSE: 97 MG/DL (ref 70–110)
POCT GLUCOSE: 97 MG/DL (ref 70–110)
POTASSIUM SERPL-SCNC: 3.5 MMOL/L (ref 3.5–5.1)
PROT SERPL-MCNC: 6.6 GM/DL (ref 5.8–7.6)
RBC # BLD AUTO: 3.57 X10(6)/MCL (ref 4.7–6.1)
SODIUM SERPL-SCNC: 140 MMOL/L (ref 136–145)
WBC # BLD AUTO: 8.45 X10(3)/MCL (ref 4.5–11.5)

## 2024-10-30 PROCEDURE — 63600175 PHARM REV CODE 636 W HCPCS: Performed by: NURSE ANESTHETIST, CERTIFIED REGISTERED

## 2024-10-30 PROCEDURE — 43235 EGD DIAGNOSTIC BRUSH WASH: CPT | Performed by: INTERNAL MEDICINE

## 2024-10-30 PROCEDURE — 63600175 PHARM REV CODE 636 W HCPCS

## 2024-10-30 PROCEDURE — 20000000 HC ICU ROOM

## 2024-10-30 PROCEDURE — 85025 COMPLETE CBC W/AUTO DIFF WBC: CPT | Performed by: HOSPITALIST

## 2024-10-30 PROCEDURE — 25000003 PHARM REV CODE 250: Performed by: NURSE ANESTHETIST, CERTIFIED REGISTERED

## 2024-10-30 PROCEDURE — 80053 COMPREHEN METABOLIC PANEL: CPT | Performed by: HOSPITALIST

## 2024-10-30 PROCEDURE — 36415 COLL VENOUS BLD VENIPUNCTURE: CPT | Performed by: HOSPITALIST

## 2024-10-30 PROCEDURE — 37000009 HC ANESTHESIA EA ADD 15 MINS: Performed by: INTERNAL MEDICINE

## 2024-10-30 PROCEDURE — 37000008 HC ANESTHESIA 1ST 15 MINUTES: Performed by: INTERNAL MEDICINE

## 2024-10-30 PROCEDURE — 94760 N-INVAS EAR/PLS OXIMETRY 1: CPT

## 2024-10-30 PROCEDURE — 94761 N-INVAS EAR/PLS OXIMETRY MLT: CPT

## 2024-10-30 PROCEDURE — 25000003 PHARM REV CODE 250

## 2024-10-30 PROCEDURE — 93010 ELECTROCARDIOGRAM REPORT: CPT | Mod: ,,, | Performed by: INTERNAL MEDICINE

## 2024-10-30 PROCEDURE — 25000003 PHARM REV CODE 250: Performed by: GENERAL PRACTICE

## 2024-10-30 PROCEDURE — 99900035 HC TECH TIME PER 15 MIN (STAT)

## 2024-10-30 PROCEDURE — 27000207 HC ISOLATION

## 2024-10-30 PROCEDURE — 0DHA4UZ INSERTION OF FEEDING DEVICE INTO JEJUNUM, PERCUTANEOUS ENDOSCOPIC APPROACH: ICD-10-PCS | Performed by: INTERNAL MEDICINE

## 2024-10-30 PROCEDURE — 99900026 HC AIRWAY MAINTENANCE (STAT)

## 2024-10-30 PROCEDURE — 94003 VENT MGMT INPAT SUBQ DAY: CPT

## 2024-10-30 PROCEDURE — 25000003 PHARM REV CODE 250: Performed by: INTERNAL MEDICINE

## 2024-10-30 PROCEDURE — 63600175 PHARM REV CODE 636 W HCPCS: Performed by: GENERAL PRACTICE

## 2024-10-30 PROCEDURE — C1769 GUIDE WIRE: HCPCS | Performed by: INTERNAL MEDICINE

## 2024-10-30 PROCEDURE — 99900031 HC PATIENT EDUCATION (STAT)

## 2024-10-30 PROCEDURE — 93005 ELECTROCARDIOGRAM TRACING: CPT

## 2024-10-30 PROCEDURE — 27100171 HC OXYGEN HIGH FLOW UP TO 24 HOURS

## 2024-10-30 RX ORDER — KETAMINE HCL IN 0.9 % NACL 50 MG/5 ML
SYRINGE (ML) INTRAVENOUS
Status: DISPENSED
Start: 2024-10-30 | End: 2024-10-30

## 2024-10-30 RX ORDER — PHENYLEPHRINE HYDROCHLORIDE 10 MG/ML
INJECTION INTRAVENOUS
Status: COMPLETED
Start: 2024-10-30 | End: 2024-10-30

## 2024-10-30 RX ORDER — PROPOFOL 10 MG/ML
VIAL (ML) INTRAVENOUS
Status: DISCONTINUED | OUTPATIENT
Start: 2024-10-30 | End: 2024-10-30

## 2024-10-30 RX ORDER — PHENYLEPHRINE HCL IN 0.9% NACL 1 MG/10 ML
SYRINGE (ML) INTRAVENOUS
Status: COMPLETED
Start: 2024-10-30 | End: 2024-10-30

## 2024-10-30 RX ORDER — PHENYLEPHRINE HCL IN 0.9% NACL 1 MG/10 ML
SYRINGE (ML) INTRAVENOUS
Status: DISCONTINUED | OUTPATIENT
Start: 2024-10-30 | End: 2024-10-30

## 2024-10-30 RX ORDER — LIDOCAINE HYDROCHLORIDE 10 MG/ML
INJECTION, SOLUTION EPIDURAL; INFILTRATION; INTRACAUDAL; PERINEURAL
Status: DISCONTINUED | OUTPATIENT
Start: 2024-10-30 | End: 2024-10-30

## 2024-10-30 RX ORDER — METOPROLOL TARTRATE 1 MG/ML
INJECTION, SOLUTION INTRAVENOUS
Status: DISPENSED
Start: 2024-10-30 | End: 2024-10-31

## 2024-10-30 RX ORDER — DEXTROSE MONOHYDRATE 100 MG/ML
INJECTION, SOLUTION INTRAVENOUS CONTINUOUS PRN
Status: DISCONTINUED | OUTPATIENT
Start: 2024-10-30 | End: 2024-10-30

## 2024-10-30 RX ORDER — PROPOFOL 10 MG/ML
VIAL (ML) INTRAVENOUS
Status: COMPLETED
Start: 2024-10-30 | End: 2024-10-30

## 2024-10-30 RX ORDER — FENTANYL CITRATE 50 UG/ML
25 INJECTION, SOLUTION INTRAMUSCULAR; INTRAVENOUS ONCE
Status: COMPLETED | OUTPATIENT
Start: 2024-10-30 | End: 2024-10-30

## 2024-10-30 RX ORDER — LIDOCAINE HYDROCHLORIDE 10 MG/ML
INJECTION, SOLUTION EPIDURAL; INFILTRATION; INTRACAUDAL; PERINEURAL
Status: COMPLETED
Start: 2024-10-30 | End: 2024-10-30

## 2024-10-30 RX ORDER — KETAMINE HYDROCHLORIDE 100 MG/ML
INJECTION, SOLUTION INTRAMUSCULAR; INTRAVENOUS
Status: DISCONTINUED | OUTPATIENT
Start: 2024-10-30 | End: 2024-10-30

## 2024-10-30 RX ADMIN — RIVAROXABAN 20 MG: 10 TABLET, FILM COATED ORAL at 08:10

## 2024-10-30 RX ADMIN — PHENYLEPHRINE HYDROCHLORIDE: 10 INJECTION INTRAVENOUS at 10:10

## 2024-10-30 RX ADMIN — Medication 200 MCG: at 10:10

## 2024-10-30 RX ADMIN — LINEZOLID 600 MG: 600 INJECTION, SOLUTION INTRAVENOUS at 08:10

## 2024-10-30 RX ADMIN — KETAMINE HYDROCHLORIDE 25 MG: 100 INJECTION INTRAMUSCULAR; INTRAVENOUS at 09:10

## 2024-10-30 RX ADMIN — SODIUM CHLORIDE 2 G: 9 INJECTION, SOLUTION INTRAVENOUS at 02:10

## 2024-10-30 RX ADMIN — Medication 200 MCG: at 09:10

## 2024-10-30 RX ADMIN — SODIUM CHLORIDE 2 G: 9 INJECTION, SOLUTION INTRAVENOUS at 12:10

## 2024-10-30 RX ADMIN — PROPOFOL 100 MCG/KG/MIN: 10 INJECTION, EMULSION INTRAVENOUS at 10:10

## 2024-10-30 RX ADMIN — SODIUM CHLORIDE, POTASSIUM CHLORIDE, SODIUM LACTATE AND CALCIUM CHLORIDE 500 ML: 600; 310; 30; 20 INJECTION, SOLUTION INTRAVENOUS at 08:10

## 2024-10-30 RX ADMIN — QUETIAPINE FUMARATE 50 MG: 25 TABLET ORAL at 08:10

## 2024-10-30 RX ADMIN — Medication 300 MCG: at 09:10

## 2024-10-30 RX ADMIN — METOPROLOL TARTRATE 5 MG: 1 INJECTION, SOLUTION INTRAVENOUS at 08:10

## 2024-10-30 RX ADMIN — KETAMINE HYDROCHLORIDE 25 MG: 100 INJECTION INTRAMUSCULAR; INTRAVENOUS at 10:10

## 2024-10-30 RX ADMIN — PROPOFOL 50 MG: 10 INJECTION, EMULSION INTRAVENOUS at 09:10

## 2024-10-30 RX ADMIN — PANTOPRAZOLE SODIUM 40 MG: 40 INJECTION, POWDER, LYOPHILIZED, FOR SOLUTION INTRAVENOUS at 08:10

## 2024-10-30 RX ADMIN — FENTANYL CITRATE 25 MCG: 50 INJECTION, SOLUTION INTRAMUSCULAR; INTRAVENOUS at 08:10

## 2024-10-30 RX ADMIN — PROPOFOL 125 MCG/KG/MIN: 10 INJECTION, EMULSION INTRAVENOUS at 09:10

## 2024-10-30 RX ADMIN — SODIUM CHLORIDE 2 G: 9 INJECTION, SOLUTION INTRAVENOUS at 05:10

## 2024-10-30 RX ADMIN — LIDOCAINE HYDROCHLORIDE 50 MG: 10 INJECTION, SOLUTION EPIDURAL; INFILTRATION; INTRACAUDAL; PERINEURAL at 09:10

## 2024-10-30 RX ADMIN — Medication 300 MCG: at 10:10

## 2024-10-30 RX ADMIN — PHENYLEPHRINE HYDROCHLORIDE 0.72 MCG/KG/MIN: 10 INJECTION INTRAVENOUS at 10:10

## 2024-10-30 RX ADMIN — DEXTROSE MONOHYDRATE 250 ML: 100 INJECTION, SOLUTION INTRAVENOUS at 09:10

## 2024-10-30 RX ADMIN — Medication 100 MCG: at 09:10

## 2024-10-30 RX ADMIN — DEXTROSE MONOHYDRATE: 100 INJECTION, SOLUTION INTRAVENOUS at 09:10

## 2024-10-30 NOTE — PROVATION PATIENT INSTRUCTIONS
Discharge Summary/Instructions after an Endoscopic Procedure  Patient Name: Delio Daniel  Patient MRN: [id number]  Patient YOB: 1955 Wednesday, October 30, 2024  Gabriele Salcido MD  Dear patient,  As a result of recent federal legislation (The Federal Cures Act), you may   receive lab or pathology results from your procedure in your [facility]   account before your physician is able to contact you. Your physician or   their representative will relay the results to you with their   recommendations at their soonest availability.  Thank you,  RESTRICTIONS:  During your procedure today, you received medications for sedation.  These   medications may affect your judgment, balance and coordination.  Therefore,   for 24 hours, you have the following restrictions:   - DO NOT drive a car, operate machinery, make legal/financial decisions,   sign important papers or drink alcohol.    ACTIVITY:  Today: no heavy lifting, straining or running due to procedural   sedation/anesthesia.  The following day: return to full activity including work.  DIET:  Eat and drink normally unless instructed otherwise.     TREATMENT FOR COMMON SIDE EFFECTS:  - Mild abdominal pain, nausea, belching, bloating or excessive gas:  rest,   eat lightly and use a heating pad.  - Sore Throat: treat with throat lozenges and/or gargle with warm salt   water.  - Because air was used during the procedure, expelling large amounts of air   from your rectum or belching is normal.  - If a bowel prep was taken, you may not have a bowel movement for 1-3 days.    This is normal.  SYMPTOMS TO WATCH FOR AND REPORT TO YOUR PHYSICIAN:  1. Abdominal pain or bloating, other than gas cramps.  2. Chest pain.  3. Back pain.  4. Signs of infection such as: chills or fever occurring within 24 hours   after the procedure.  5. Rectal bleeding, which would show as bright red, maroon, or black stools.   (A tablespoon of blood from the rectum is not serious, especially  if   hemorrhoids are present.)  6. Vomiting.  7. Weakness or dizziness.  GO DIRECTLY TO THE NEAREST EMERGENCY ROOM IF YOU HAVE ANY OF THE FOLLOWING:      Difficulty breathing              Chills and/or fever over 101 F   Persistent vomiting and/or vomiting blood   Severe abdominal pain   Severe chest pain   Black, tarry stools   Bleeding- more than one tablespoon   Any other symptom or condition that you feel may need urgent attention  Your doctor recommends these additional instructions:  If any biopsies were taken, your doctors clinic will contact you in 1 to 2   weeks with any results.  - Return patient to ICU for ongoing care.   - NPO.   - Continue present medications.   - Please follow the post-PEG recommendations including: external bolster   snug to abdominal wall, may use PEG today for meds, water, and feeding.   Clean site with soap and water daily and dry thoroughly.  For questions, problems or results please call your physician - Gabriele Salcido MD at Work:  [phone].  [facility] ED at [phone]  IF A COMPLICATION OR EMERGENCY SITUATION ARISES AND YOU ARE UNABLE TO REACH   YOUR PHYSICIAN - GO DIRECTLY TO THE EMERGENCY ROOM.  MD Gabriele Chaudhary MD  10/30/2024 12:02:22 PM  This report has been verified and signed electronically.  Dear patient,  As a result of recent federal legislation (The Federal Cures Act), you may   receive lab or pathology results from your procedure in your [facility]   account before your physician is able to contact you. Your physician or   their representative will relay the results to you with their   recommendations at their soonest availability.  Thank you,  PROVATION

## 2024-10-30 NOTE — PROGRESS NOTES
[facility] - Emergency Dept  Pulmonary Critical Care Note    Patient Name: Delio Daniel Jr.  MRN: [id number]  Admission Date: 10/20/2024  Hospital Length of Stay: 10 days  Code Status: Full Code  Attending Provider: Itz Francisco MD  Primary Care Provider: Jl Briones MD     Subjective:     HPI:   Patient is a 68 y/o male with extensive PMH who presented from NH on 10/20/24 with decreased responsiveness, severe sepsis, and recurrent UTI. 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     Patient transferred to ED from [facility] with lethargy and tachycardia. Family at bedside reports patient is nonverbal at baseline but typically more alert. In the ED, patient is febrile, tachycardic with -190s, tachypneic, /60. EKG shows Afib with RVR. Diltiazem drip started in ED. Labs are significant for WBC of 16.5, lactic acid of 3.3, Cr 1.48, BUN 45.3, Na 155, K+ 5.1, troponin elevated at 0.118. UA with evidence of UTI. Of note, pt was being treated outpatient for a UTI, currently on day 4 of 7 day Rocephin course. Urine culture 10/16/24 with multidrug resistant Klebsiella pneumonia and Proteus mirabilis with susceptibility to Cefepime. Patient received 3L of NS and initiated on Vanc and Cefepime in ED. Admitted to the ICU on mechanical ventilation via trach.    Hospital Course/Significant events:  10/20/24: Admitted to ICU for severe sepsis     24 Hour Interval History:  No acute events overnight.  To undergo PEG tube extension today by Gastroenterology.    Past Medical History:   Diagnosis Date    Arthritis     Atrial fibrillation     BPH (benign prostatic hyperplasia)     Cardiac  arrest     Coronary artery disease     Cyst, kidney, acquired     Diverticulosis     Hyperlipidemia     Hypertension     MI (myocardial infarction)     Obesity     Steatosis of liver     Stroke        Past Surgical History:   Procedure Laterality Date    A-V CARDIAC PACEMAKER INSERTION Right     CARDIAC CATHETERIZATION      COLONOSCOPY W/ BIOPSIES      CRANIECTOMY Right 12/20/2023    Procedure: CRANIECTOMY;  Surgeon: Artem Can MD;  Location: [facility];  Service: Neurosurgery;  Laterality: Right;    ESOPHAGOGASTRODUODENOSCOPY W/ PEG N/A 1/2/2024    Procedure: PEG;  Surgeon: Tani Day MD;  Location: [facility] ENDOSCOPY;  Service: Gastroenterology;  Laterality: N/A;    excision of colon      TRACHEOSTOMY N/A 12/29/2023    Procedure: CREATION, TRACHEOSTOMY;  Surgeon: Patricia Winslow MD;  Location: [facility];  Service: ENT;  Laterality: N/A;  REQ 1130 //  NEEDS 2 SCRUBS       Social History     Socioeconomic History    Marital status:     Number of children: 9   Occupational History    Occupation: retired   Tobacco Use    Smoking status: Never    Smokeless tobacco: Never   Substance and Sexual Activity    Alcohol use: Not Currently    Drug use: Not Currently    Sexual activity: Not Currently     Partners: Female     Social Drivers of Health     Financial Resource Strain: Patient Unable To Answer (10/21/2024)    Overall Financial Resource Strain (CARDIA)     Difficulty of Paying Living Expenses: Patient unable to answer   Food Insecurity: Patient Unable To Answer (10/21/2024)    Hunger Vital Sign     Worried About Running Out of Food in the Last Year: Patient unable to answer     Ran Out of Food in the Last Year: Patient unable to answer   Transportation Needs: Patient Unable To Answer (10/21/2024)    TRANSPORTATION NEEDS     Transportation : Patient unable to answer   Physical Activity: Sufficiently Active (8/5/2024)    Exercise Vital Sign     Days of Exercise per Week: 5 days     Minutes of Exercise  per Session: 30 min   Stress: Patient Unable To Answer (10/21/2024)    [employer] [place] [employer] - Occupational Stress Questionnaire     Feeling of Stress : Patient unable to answer   Housing Stability: Patient Unable To Answer (10/21/2024)    Housing Stability Vital Sign     Unable to Pay for Housing in the Last Year: Patient unable to answer     Homeless in the Last Year: Patient unable to answer       Current Outpatient Medications   Medication Instructions    ascorbic Acid (VITAMIN C) 500 mg, 2 times daily, Per PEG tube    busPIRone (BUSPAR) 5 mg, Per G Tube, 3 times daily    cholestyramine (QUESTRAN) 4 gram packet 4 g, Daily, Via PEG tube    cholestyramine-aspartame (QUESTRAN LIGHT) 4 gram PwPk 4 g, Per G Tube, 2 times daily    diltiaZEM (CARDIZEM) 60 mg, Per G Tube, Every 6 hours    ferrous sulfate 300 mg, Oral, Daily    finasteride (PROSCAR) 5 mg, Oral, Daily    furosemide (LASIX) 80 mg, Per G Tube, As needed (PRN)    L. acidophilus/L.bulgaricus (FLORANEX ORAL) 1 packet, PEG Tube, Daily    levetiracetam 1,500 mg, Per G Tube, 2 times daily, Nursing home reports medication hold by MD until level redraw on Monday.    LIPITOR 10 mg, Per G Tube, Daily    metoclopramide HCl (REGLAN) 10 mg, Per G Tube, 3 times daily before meals    metoprolol tartrate (LOPRESSOR) 100 mg, Per G Tube, 2 times daily    miconazole NITRATE 2 % (MICOTIN) 2 % top powder Topical (Top), 2 times daily    multivitamin (THERAGRAN) per tablet 1 tablet, Per G Tube, Daily    pantoprazole (PROTONIX) 40 mg, Intravenous, 2 times daily    polyethylene glycol (GLYCOLAX) 17 g, Oral, 2 times daily PRN    protein supplement (PROMOD PROTEIN ORAL) 30 mLs, Per G Tube, Daily    QUEtiapine (SEROQUEL) 25 mg, Per G Tube, 2 times daily    scopolamine (TRANSDERM-SCOP) 1.3-1.5 mg (1 mg over 3 days) 1 patch, Transdermal, Every 3 days    sucralfate (CARAFATE) 1 g, Per G Tube, Before meals & nightly    tamsulosin (FLOMAX) 0.4 mg, Oral, Nightly     venlafaxine 75 mg TR24 1 tablet, Per G Tube, 2 times daily    XARELTO 20 mg Tab 1 tablet, Per G Tube, Nightly       Current Inpatient Medications   atorvastatin  10 mg Per G Tube Daily    ketamine in 0.9 % sod chloride        linezolid  600 mg Intravenous Q12H    meropenem IV (PEDS and ADULTS)  2 g Intravenous Q8H    pantoprazole  40 mg Intravenous Daily    QUEtiapine  50 mg Per G Tube BID    rivaroxaban  20 mg Per G Tube Nightly    scopolamine  1 patch Transdermal Q3 Days       Current Intravenous Infusions    Review of Systems   Unable to perform ROS: Mental status change        Objective:       Intake/Output Summary (Last 24 hours) at 10/30/2024 1108  Last data filed at 10/30/2024 0951  Gross per 24 hour   Intake 2418.35 ml   Output 2000 ml   Net 418.35 ml         Vital Signs (Most Recent):  Temp: 99.3 °F (37.4 °C) (10/30/24 1059)  Pulse: 88 (10/30/24 1059)  Resp: 20 (10/30/24 1059)  BP: 116/89 (10/30/24 1059)  SpO2: 100 % (10/30/24 1059)  Body mass index is 22.49 kg/m².  Weight: 69.1 kg (152 lb 5.4 oz) Vital Signs (24h Range):  Temp:  [95 °F (35 °C)-99.3 °F (37.4 °C)] 99.3 °F (37.4 °C)  Pulse:  [] 88  Resp:  [2-30] 20  SpO2:  [97 %-100 %] 100 %  BP: ()/(62-99) 116/89     Physical Exam  Constitutional:       General: He is not in acute distress.     Appearance: He is ill-appearing.      Comments: Thin, Chronically ill-appearing   HENT:      Mouth/Throat:      Mouth: Mucous membranes are dry.   Cardiovascular:      Rate and Rhythm: Tachycardia present. Rhythm irregular.   Abdominal:      General: There is no distension.      Palpations: Abdomen is soft.      Comments: PEG tube in place    Musculoskeletal:      Comments: LUE and LLE in contracture    Skin:     General: Skin is warm and dry.      Comments: Large sacral pressure ulcer. BLE with scattered superficial abrasions.    Neurological:      Comments: Non-sedated. Nonverbal at baseline.  Does not follow commands. Winces and withdraws to painful  "stimuli in RUE and RLE.       Lines/Drains/Airways       Drain  Duration                  Gastrostomy/Enterostomy 01/02/24 1230  days         Urethral Catheter 10/20/24 2210 Silicone 16 Fr. 9 days         Rectal Tube 10/25/24 2030 4 days              Airway  Duration             Adult Surgical Airway 08/19/24 0120 Shiley Extra Large Cuffed Distal 6.0/ 75mm 72 days              Peripheral Intravenous Line  Duration                  Midline Catheter - Single Lumen 10/20/24 1530 Right 9 days         Peripheral IV - Single Lumen 10/20/24 1600 18 G Anterior;Distal;Left Upper Arm 9 days                    Significant Labs:    Lab Results   Component Value Date    WBC 8.45 10/30/2024    HGB 9.4 (L) 10/30/2024    HCT 29.5 (L) 10/30/2024    MCV 82.6 10/30/2024     10/30/2024           BMP  Lab Results   Component Value Date     10/30/2024    K 3.5 10/30/2024    CO2 20 (L) 10/30/2024    BUN 5.8 (L) 10/30/2024    CREATININE 0.58 (L) 10/30/2024    CALCIUM 8.2 (L) 10/30/2024    AGAP 12.0 10/30/2024    EGFRNONAA 61 04/23/2022         ABG  No results for input(s): "PH", "PO2", "PCO2", "HCO3", "POCBASEDEF" in the last 168 hours.      Mechanical Ventilation Support:  Vent Mode: A/C (10/30/24 0819)  Ventilator Initiated: Yes (10/20/24 1546)  Set Rate: 20 BPM (10/30/24 0819)  Vt Set: 500 mL (10/30/24 0819)  PEEP/CPAP: 5 cmH20 (10/30/24 0819)  Oxygen Concentration (%): 30 (10/30/24 0819)  Peak Airway Pressure: 24 cmH20 (10/30/24 0819)  Total Ve: 9 L/m (10/30/24 0819)  F/VT Ratio<105 (RSBI): (!) 42.9 (10/30/24 0246)    Significant Imaging:  I have reviewed the pertinent imaging within the past 24 hours.  No new imaging    Assessment/Plan:     Assessment  Sepsis  VRE Enterococcus and ESBL Klebsiella bacteremia  Klebsiella pneumoniae and Proteus mirabilis pneumonia  UTI  Afib with RVR  Hypernatremia   Sacral wound  PEG tube feeding intolerance    Plan  Admitted to ICU   On mechanical ventilation via trach   CIS " following, patient received digoxin on 10/21/2024. Continue lopressor 25 mg TID  Echocardiogram with no evidence of endocarditis.  Blood cultures on 10/22/2024 no growth at 5 days   Urine culture with no growth to date, blood and respiratory cultures as above  ID following, discontinued cefepime and dapto on 10/23, continue merrem (10/23-11/6) and linezolid (10/22-11/5)  Wound care and General surgery consulted; appreciate further recommendations  PEG tube extension today.  Palliative care consulted    DVT Prophylaxis: SCDs, continue home Xarelto   GI Prophylaxis: PPI     32 minutes of critical care was time spent personally by me on the following activities: development of treatment plan with patient or surrogate and bedside caregivers, discussions with consultants, evaluation of patient's response to treatment, examination of patient, ordering and performing treatments and interventions, ordering and review of laboratory studies, ordering and review of radiographic studies, pulse oximetry, re-evaluation of patient's condition.  This critical care time did not overlap with that of any other provider or involve time for any procedures.     Jl Torrez MD  Pulmonary Critical Care Medicine  [facility]  DOS: 10/30/2024

## 2024-10-31 LAB
ALBUMIN SERPL-MCNC: 2.2 G/DL (ref 3.4–4.8)
ALBUMIN/GLOB SERPL: 0.5 RATIO (ref 1.1–2)
ALP SERPL-CCNC: 123 UNIT/L (ref 40–150)
ALT SERPL-CCNC: 10 UNIT/L (ref 0–55)
ANION GAP SERPL CALC-SCNC: 14 MEQ/L
AST SERPL-CCNC: 17 UNIT/L (ref 5–34)
BASOPHILS # BLD AUTO: 0.07 X10(3)/MCL
BASOPHILS NFR BLD AUTO: 0.7 %
BILIRUB SERPL-MCNC: 0.8 MG/DL
BUN SERPL-MCNC: 6.6 MG/DL (ref 8.4–25.7)
CALCIUM SERPL-MCNC: 8.1 MG/DL (ref 8.8–10)
CHLORIDE SERPL-SCNC: 106 MMOL/L (ref 98–107)
CO2 SERPL-SCNC: 20 MMOL/L (ref 23–31)
CREAT SERPL-MCNC: 0.66 MG/DL (ref 0.72–1.25)
CREAT/UREA NIT SERPL: 10
EOSINOPHIL # BLD AUTO: 0.15 X10(3)/MCL (ref 0–0.9)
EOSINOPHIL NFR BLD AUTO: 1.4 %
ERYTHROCYTE [DISTWIDTH] IN BLOOD BY AUTOMATED COUNT: 17.1 % (ref 11.5–17)
GFR SERPLBLD CREATININE-BSD FMLA CKD-EPI: >60 ML/MIN/1.73/M2
GLOBULIN SER-MCNC: 4.2 GM/DL (ref 2.4–3.5)
GLUCOSE SERPL-MCNC: 49 MG/DL (ref 82–115)
HCT VFR BLD AUTO: 29.7 % (ref 42–52)
HGB BLD-MCNC: 8.8 G/DL (ref 14–18)
IMM GRANULOCYTES # BLD AUTO: 0.04 X10(3)/MCL (ref 0–0.04)
IMM GRANULOCYTES NFR BLD AUTO: 0.4 %
LYMPHOCYTES # BLD AUTO: 4.95 X10(3)/MCL (ref 0.6–4.6)
LYMPHOCYTES NFR BLD AUTO: 47.6 %
MCH RBC QN AUTO: 26 PG (ref 27–31)
MCHC RBC AUTO-ENTMCNC: 29.6 G/DL (ref 33–36)
MCV RBC AUTO: 87.6 FL (ref 80–94)
MONOCYTES # BLD AUTO: 1.53 X10(3)/MCL (ref 0.1–1.3)
MONOCYTES NFR BLD AUTO: 14.7 %
NEUTROPHILS # BLD AUTO: 3.66 X10(3)/MCL (ref 2.1–9.2)
NEUTROPHILS NFR BLD AUTO: 35.2 %
NRBC BLD AUTO-RTO: 0 %
OHS QRS DURATION: 86 MS
OHS QTC CALCULATION: 489 MS
PLATELET # BLD AUTO: 236 X10(3)/MCL (ref 130–400)
PMV BLD AUTO: 10 FL (ref 7.4–10.4)
POCT GLUCOSE: 200 MG/DL (ref 70–110)
POCT GLUCOSE: 85 MG/DL (ref 70–110)
POCT GLUCOSE: 88 MG/DL (ref 70–110)
POCT GLUCOSE: 88 MG/DL (ref 70–110)
POTASSIUM SERPL-SCNC: 3.7 MMOL/L (ref 3.5–5.1)
PROT SERPL-MCNC: 6.4 GM/DL (ref 5.8–7.6)
RBC # BLD AUTO: 3.39 X10(6)/MCL (ref 4.7–6.1)
SODIUM SERPL-SCNC: 140 MMOL/L (ref 136–145)
WBC # BLD AUTO: 10.4 X10(3)/MCL (ref 4.5–11.5)

## 2024-10-31 PROCEDURE — 99900035 HC TECH TIME PER 15 MIN (STAT)

## 2024-10-31 PROCEDURE — 94003 VENT MGMT INPAT SUBQ DAY: CPT

## 2024-10-31 PROCEDURE — 94760 N-INVAS EAR/PLS OXIMETRY 1: CPT

## 2024-10-31 PROCEDURE — 25000003 PHARM REV CODE 250

## 2024-10-31 PROCEDURE — 25000003 PHARM REV CODE 250: Performed by: GENERAL PRACTICE

## 2024-10-31 PROCEDURE — 63600175 PHARM REV CODE 636 W HCPCS: Performed by: GENERAL PRACTICE

## 2024-10-31 PROCEDURE — 36415 COLL VENOUS BLD VENIPUNCTURE: CPT | Performed by: HOSPITALIST

## 2024-10-31 PROCEDURE — 94761 N-INVAS EAR/PLS OXIMETRY MLT: CPT

## 2024-10-31 PROCEDURE — 85025 COMPLETE CBC W/AUTO DIFF WBC: CPT | Performed by: HOSPITALIST

## 2024-10-31 PROCEDURE — 80162 ASSAY OF DIGOXIN TOTAL: CPT | Performed by: NURSE PRACTITIONER

## 2024-10-31 PROCEDURE — 89220 SPUTUM SPECIMEN COLLECTION: CPT

## 2024-10-31 PROCEDURE — 20000000 HC ICU ROOM

## 2024-10-31 PROCEDURE — 80053 COMPREHEN METABOLIC PANEL: CPT | Performed by: HOSPITALIST

## 2024-10-31 PROCEDURE — 27100171 HC OXYGEN HIGH FLOW UP TO 24 HOURS

## 2024-10-31 PROCEDURE — 63600175 PHARM REV CODE 636 W HCPCS

## 2024-10-31 PROCEDURE — 99900031 HC PATIENT EDUCATION (STAT)

## 2024-10-31 PROCEDURE — 27000207 HC ISOLATION

## 2024-10-31 PROCEDURE — 99900026 HC AIRWAY MAINTENANCE (STAT)

## 2024-10-31 PROCEDURE — A4216 STERILE WATER/SALINE, 10 ML: HCPCS

## 2024-10-31 RX ORDER — METOPROLOL TARTRATE 1 MG/ML
INJECTION, SOLUTION INTRAVENOUS
Status: COMPLETED
Start: 2024-10-31 | End: 2024-10-31

## 2024-10-31 RX ORDER — METOPROLOL TARTRATE 1 MG/ML
5 INJECTION, SOLUTION INTRAVENOUS EVERY 5 MIN PRN
Status: COMPLETED | OUTPATIENT
Start: 2024-10-31 | End: 2024-10-31

## 2024-10-31 RX ORDER — WATER FOR INJECTION,STERILE
VIAL (ML) INJECTION
Status: COMPLETED
Start: 2024-10-31 | End: 2024-10-31

## 2024-10-31 RX ADMIN — METOPROLOL TARTRATE 5 MG: 1 INJECTION, SOLUTION INTRAVENOUS at 07:10

## 2024-10-31 RX ADMIN — GUAIFENESIN AND DEXTROMETHORPHAN 10 ML: 100; 10 SYRUP ORAL at 05:10

## 2024-10-31 RX ADMIN — METOPROLOL TARTRATE 5 MG: 1 INJECTION, SOLUTION INTRAVENOUS at 08:10

## 2024-10-31 RX ADMIN — LINEZOLID 600 MG: 600 INJECTION, SOLUTION INTRAVENOUS at 10:10

## 2024-10-31 RX ADMIN — PANTOPRAZOLE SODIUM 40 MG: 40 INJECTION, POWDER, LYOPHILIZED, FOR SOLUTION INTRAVENOUS at 08:10

## 2024-10-31 RX ADMIN — METOPROLOL TARTRATE 5 MG: 1 INJECTION, SOLUTION INTRAVENOUS at 05:10

## 2024-10-31 RX ADMIN — SCOPOLAMINE 1 PATCH: 1 PATCH TRANSDERMAL at 08:10

## 2024-10-31 RX ADMIN — SODIUM CHLORIDE 2 G: 9 INJECTION, SOLUTION INTRAVENOUS at 11:10

## 2024-10-31 RX ADMIN — RIVAROXABAN 20 MG: 10 TABLET, FILM COATED ORAL at 08:10

## 2024-10-31 RX ADMIN — LINEZOLID 600 MG: 600 INJECTION, SOLUTION INTRAVENOUS at 08:10

## 2024-10-31 RX ADMIN — WATER 10 ML: 1 INJECTION INTRAMUSCULAR; INTRAVENOUS; SUBCUTANEOUS at 08:10

## 2024-10-31 RX ADMIN — SODIUM CHLORIDE 2 G: 9 INJECTION, SOLUTION INTRAVENOUS at 04:10

## 2024-10-31 RX ADMIN — SODIUM CHLORIDE 2 G: 9 INJECTION, SOLUTION INTRAVENOUS at 08:10

## 2024-10-31 RX ADMIN — QUETIAPINE FUMARATE 50 MG: 25 TABLET ORAL at 08:10

## 2024-10-31 RX ADMIN — ATORVASTATIN CALCIUM 10 MG: 10 TABLET, FILM COATED ORAL at 08:10

## 2024-10-31 RX ADMIN — ACETAMINOPHEN 650 MG: 325 TABLET ORAL at 08:10

## 2024-10-31 RX ADMIN — DEXTROSE MONOHYDRATE 250 ML: 100 INJECTION, SOLUTION INTRAVENOUS at 07:10

## 2024-10-31 NOTE — PROGRESS NOTES
[facility] - Emergency Dept  Pulmonary Critical Care Note    Patient Name: Delio Daniel Jr.  MRN: [id number]  Admission Date: 10/20/2024  Hospital Length of Stay: 11 days  Code Status: Full Code  Attending Provider: Itz Francisco MD  Primary Care Provider: Jl Briones MD     Subjective:     HPI:   Patient is a 70 y/o male with extensive PMH who presented from NH on 10/20/24 with decreased responsiveness, severe sepsis, and recurrent UTI. 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     Patient transferred to ED from [facility] with lethargy and tachycardia. Family at bedside reports patient is nonverbal at baseline but typically more alert. In the ED, patient is febrile, tachycardic with -190s, tachypneic, /60. EKG shows Afib with RVR. Diltiazem drip started in ED. Labs are significant for WBC of 16.5, lactic acid of 3.3, Cr 1.48, BUN 45.3, Na 155, K+ 5.1, troponin elevated at 0.118. UA with evidence of UTI. Of note, pt was being treated outpatient for a UTI, currently on day 4 of 7 day Rocephin course. Urine culture 10/16/24 with multidrug resistant Klebsiella pneumonia and Proteus mirabilis with susceptibility to Cefepime. Patient received 3L of NS and initiated on Vanc and Cefepime in ED. Admitted to the ICU on mechanical ventilation via trach.    Hospital Course/Significant events:  10/20/24: Admitted to ICU for severe sepsis     24 Hour Interval History:  No acute events overnight.  Underwent PEG tube extension yesterday by Gastroenterology.  Having some hypoglycemia.    Past Medical History:   Diagnosis Date    Arthritis     Atrial fibrillation     BPH (benign prostatic  hyperplasia)     Cardiac arrest     Coronary artery disease     Cyst, kidney, acquired     Diverticulosis     Hyperlipidemia     Hypertension     MI (myocardial infarction)     Obesity     Steatosis of liver     Stroke        Past Surgical History:   Procedure Laterality Date    A-V CARDIAC PACEMAKER INSERTION Right     CARDIAC CATHETERIZATION      COLONOSCOPY W/ BIOPSIES      CRANIECTOMY Right 12/20/2023    Procedure: CRANIECTOMY;  Surgeon: Artem Can MD;  Location: [facility];  Service: Neurosurgery;  Laterality: Right;    ESOPHAGOGASTRODUODENOSCOPY W/ PEG N/A 1/2/2024    Procedure: PEG;  Surgeon: Tani Day MD;  Location: [facility] ENDOSCOPY;  Service: Gastroenterology;  Laterality: N/A;    ESOPHAGOGASTRODUODENOSCOPY W/ PEG N/A 10/30/2024    Procedure: EGD w/ Jtube placement;  Surgeon: Gabriele Salcido MD;  Location: [facility] ENDOSCOPY;  Service: Endoscopy;  Laterality: N/A;  with J tube extension    excision of colon      TRACHEOSTOMY N/A 12/29/2023    Procedure: CREATION, TRACHEOSTOMY;  Surgeon: Patricia Winslow MD;  Location: [facility];  Service: ENT;  Laterality: N/A;  REQ 1130 //  NEEDS 2 SCRUBS       Social History     Socioeconomic History    Marital status:     Number of children: 9   Occupational History    Occupation: retired   Tobacco Use    Smoking status: Never    Smokeless tobacco: Never   Substance and Sexual Activity    Alcohol use: Not Currently    Drug use: Not Currently    Sexual activity: Not Currently     Partners: Female     Social Drivers of Health     Financial Resource Strain: Patient Unable To Answer (10/21/2024)    Overall Financial Resource Strain (CARDIA)     Difficulty of Paying Living Expenses: Patient unable to answer   Food Insecurity: Patient Unable To Answer (10/21/2024)    Hunger Vital Sign     Worried About Running Out of Food in the Last Year: Patient unable to answer     Ran Out of Food in the Last Year: Patient unable to answer   Transportation Needs:  Patient Unable To Answer (10/21/2024)    TRANSPORTATION NEEDS     Transportation : Patient unable to answer   Physical Activity: Sufficiently Active (8/5/2024)    Exercise Vital Sign     Days of Exercise per Week: 5 days     Minutes of Exercise per Session: 30 min   Stress: Patient Unable To Answer (10/21/2024)    [employer] [place] [employer] - Occupational Stress Questionnaire     Feeling of Stress : Patient unable to answer   Housing Stability: Patient Unable To Answer (10/21/2024)    Housing Stability Vital Sign     Unable to Pay for Housing in the Last Year: Patient unable to answer     Homeless in the Last Year: Patient unable to answer       Current Outpatient Medications   Medication Instructions    ascorbic Acid (VITAMIN C) 500 mg, 2 times daily, Per PEG tube    busPIRone (BUSPAR) 5 mg, Per G Tube, 3 times daily    cholestyramine (QUESTRAN) 4 gram packet 4 g, Daily, Via PEG tube    cholestyramine-aspartame (QUESTRAN LIGHT) 4 gram PwPk 4 g, Per G Tube, 2 times daily    diltiaZEM (CARDIZEM) 60 mg, Per G Tube, Every 6 hours    ferrous sulfate 300 mg, Oral, Daily    finasteride (PROSCAR) 5 mg, Oral, Daily    furosemide (LASIX) 80 mg, Per G Tube, As needed (PRN)    L. acidophilus/L.bulgaricus (FLORANEX ORAL) 1 packet, PEG Tube, Daily    levetiracetam 1,500 mg, Per G Tube, 2 times daily, Nursing home reports medication hold by MD until level redraw on Monday.    LIPITOR 10 mg, Per G Tube, Daily    metoclopramide HCl (REGLAN) 10 mg, Per G Tube, 3 times daily before meals    metoprolol tartrate (LOPRESSOR) 100 mg, Per G Tube, 2 times daily    miconazole NITRATE 2 % (MICOTIN) 2 % top powder Topical (Top), 2 times daily    multivitamin (THERAGRAN) per tablet 1 tablet, Per G Tube, Daily    pantoprazole (PROTONIX) 40 mg, Intravenous, 2 times daily    polyethylene glycol (GLYCOLAX) 17 g, Oral, 2 times daily PRN    protein supplement (PROMOD PROTEIN ORAL) 30 mLs, Per G Tube, Daily    QUEtiapine (SEROQUEL) 25  mg, Per G Tube, 2 times daily    scopolamine (TRANSDERM-SCOP) 1.3-1.5 mg (1 mg over 3 days) 1 patch, Transdermal, Every 3 days    sucralfate (CARAFATE) 1 g, Per G Tube, Before meals & nightly    tamsulosin (FLOMAX) 0.4 mg, Oral, Nightly    venlafaxine 75 mg TR24 1 tablet, Per G Tube, 2 times daily    XARELTO 20 mg Tab 1 tablet, Per G Tube, Nightly       Current Inpatient Medications   atorvastatin  10 mg Per G Tube Daily    linezolid  600 mg Intravenous Q12H    meropenem IV (PEDS and ADULTS)  2 g Intravenous Q8H    pantoprazole  40 mg Intravenous Daily    QUEtiapine  50 mg Per G Tube BID    rivaroxaban  20 mg Per G Tube Nightly    scopolamine  1 patch Transdermal Q3 Days       Current Intravenous Infusions    Review of Systems   Unable to perform ROS: Mental status change        Objective:       Intake/Output Summary (Last 24 hours) at 10/31/2024 0959  Last data filed at 10/31/2024 0629  Gross per 24 hour   Intake 1112.7 ml   Output 1035 ml   Net 77.7 ml         Vital Signs (Most Recent):  Temp: 97.9 °F (36.6 °C) (10/31/24 0000)  Pulse: 84 (10/31/24 0805)  Resp: (!) 7 (10/31/24 0805)  BP: 102/73 (10/31/24 0805)  SpO2: 100 % (10/31/24 0805)  Body mass index is 22.49 kg/m².  Weight: 69.1 kg (152 lb 5.4 oz) Vital Signs (24h Range):  Temp:  [95.5 °F (35.3 °C)-100.2 °F (37.9 °C)] 97.9 °F (36.6 °C)  Pulse:  [] 84  Resp:  [2-45] 7  SpO2:  [85 %-100 %] 100 %  BP: ()/(57-94) 102/73     Physical Exam  Constitutional:       General: He is not in acute distress.     Appearance: He is ill-appearing.      Comments: Thin, Chronically ill-appearing   HENT:      Mouth/Throat:      Mouth: Mucous membranes are dry.   Cardiovascular:      Rate and Rhythm: Tachycardia present. Rhythm irregular.   Abdominal:      General: There is no distension.      Palpations: Abdomen is soft.      Comments: PEG tube in place    Musculoskeletal:      Comments: LUE and LLE in contracture    Skin:     General: Skin is warm and dry.       "Comments: Large sacral pressure ulcer. BLE with scattered superficial abrasions.    Neurological:      Comments: Non-sedated. Nonverbal at baseline.  Does not follow commands. Winces and withdraws to painful stimuli in RUE and RLE.       Lines/Drains/Airways       Drain  Duration                  Urethral Catheter 10/20/24 2210 Silicone 16 Fr. 10 days         Rectal Tube 10/25/24 2030 5 days         Gastrostomy/Enterostomy 10/30/24 1200 Gastrostomy-jejunostomy feeding <1 day              Airway  Duration             Adult Surgical Airway 08/19/24 0120 Shiley Extra Large Cuffed Distal 6.0/ 75mm 73 days              Peripheral Intravenous Line  Duration                  Midline Catheter - Single Lumen 10/20/24 1530 Right 10 days         Peripheral IV - Single Lumen 10/20/24 1600 18 G Anterior;Distal;Left Upper Arm 10 days                    Significant Labs:    Lab Results   Component Value Date    WBC 10.40 10/31/2024    HGB 8.8 (L) 10/31/2024    HCT 29.7 (L) 10/31/2024    MCV 87.6 10/31/2024     10/31/2024           BMP  Lab Results   Component Value Date     10/31/2024    K 3.7 10/31/2024    CO2 20 (L) 10/31/2024    BUN 6.6 (L) 10/31/2024    CREATININE 0.66 (L) 10/31/2024    CALCIUM 8.1 (L) 10/31/2024    AGAP 14.0 10/31/2024    EGFRNONAA 61 04/23/2022         ABG  No results for input(s): "PH", "PO2", "PCO2", "HCO3", "POCBASEDEF" in the last 168 hours.      Mechanical Ventilation Support:  Vent Mode: VOLUME A/C (10/31/24 0805)  Ventilator Initiated: Yes (10/20/24 1546)  Set Rate: 20 BPM (10/31/24 0805)  Vt Set: 500 mL (10/31/24 0805)  PEEP/CPAP: 5 cmH20 (10/31/24 0805)  Oxygen Concentration (%): 30 (10/31/24 0805)  Peak Airway Pressure: 24 cmH20 (10/31/24 0805)  Total Ve: 7 L/m (10/31/24 0642)  F/VT Ratio<105 (RSBI): (!) 22.22 (10/31/24 0805)    Significant Imaging:  I have reviewed the pertinent imaging within the past 24 hours.  No new imaging    Assessment/Plan:     Assessment  Sepsis  VRE " Enterococcus and ESBL Klebsiella bacteremia  Klebsiella pneumoniae and Proteus mirabilis pneumonia  Afib with RVR  Chronic respiratory failure with tracheostomy ventilation at nursing home secondary to previous cerebrovascular accident.  Sacral wound  PEG tube feeding intolerance post jejunum extension    Plan  Admitted to ICU.  Continue with ICU care.  On chronic mechanical ventilation via trach   Digoxin and Lopressor have been held intermittently due to bouts of bradycardia  ID evaluated patient with recommendations to continue merrem (10/23-11/6) and linezolid (10/22-11/5)  Wound care and General surgery consulted; appreciate further recommendations we will need to re-evaluate after family has elected against palliation  Enteral tube feeds  Glucose replacement    DVT Prophylaxis: continue home Xarelto   GI Prophylaxis: PPI     32 minutes of critical care was time spent personally by me on the following activities: development of treatment plan with patient or surrogate and bedside caregivers, discussions with consultants, evaluation of patient's response to treatment, examination of patient, ordering and performing treatments and interventions, ordering and review of laboratory studies, ordering and review of radiographic studies, pulse oximetry, re-evaluation of patient's condition.  This critical care time did not overlap with that of any other provider or involve time for any procedures.     Jl Torrez MD  Pulmonary Critical Care Medicine  [facility]  DOS: 10/31/2024

## 2024-10-31 NOTE — PLAN OF CARE
Problem: Skin Injury Risk Increased  Goal: Skin Health and Integrity  Outcome: Progressing     Problem: Adult Inpatient Plan of Care  Goal: Plan of Care Review  Outcome: Progressing  Flowsheets (Taken 10/31/2024 1700)  Plan of Care Reviewed With:   patient   child  Goal: Patient-Specific Goal (Individualized)  Outcome: Progressing  Flowsheets (Taken 10/31/2024 1700)  Individualized Care Needs: nutrition, turning and mobility, trach/PEJ care, DM manangment, A fib management  Anxieties, Fears or Concerns: unable to assess  Patient/Family-Specific Goals (Include Timeframe): patients family reports wanting to stay in the hospital as long as possible, education provided declaring this is not our goal  Goal: Absence of Hospital-Acquired Illness or Injury  Outcome: Progressing  Goal: Optimal Comfort and Wellbeing  Outcome: Progressing  Goal: Readiness for Transition of Care  Outcome: Progressing     Problem: Infection  Goal: Absence of Infection Signs and Symptoms  Outcome: Progressing     Problem: Sepsis/Septic Shock  Goal: Optimal Coping  Outcome: Progressing  Goal: Absence of Bleeding  Outcome: Progressing  Goal: Blood Glucose Level Within Targeted Range  Outcome: Progressing  Goal: Absence of Infection Signs and Symptoms  Outcome: Progressing  Goal: Optimal Nutrition Intake  Outcome: Progressing     Problem: Pneumonia  Goal: Fluid Balance  Outcome: Progressing  Goal: Resolution of Infection Signs and Symptoms  Outcome: Progressing  Goal: Effective Oxygenation and Ventilation  Outcome: Progressing

## 2024-10-31 NOTE — PROGRESS NOTES
"Gastroenterology Progress Note    Subjective/Interval History:    S/p EGD with J tube extension 10/30: Normal esophagus. Intact gastrostomy with a patent G-tube present characterized by healthy appearing mucosa. The PEG was exchanged for a PEG-J. The distal end of the  J-tube was attached to the wall of the 3rd portion of the duodenum with a hemostatic clip. Normal examined duodenum.     Patient tolerating TFs well without significant residuals. Currently at 40 cc/hr.    Vital Signs:  /73   Pulse 84   Temp 97.9 °F (36.6 °C) (Oral)   Resp (!) 7   Ht 5' 9.02" (1.753 m)   Wt 69.1 kg (152 lb 5.4 oz)   SpO2 100%   BMI 22.49 kg/m²   Body mass index is 22.49 kg/m².    Physical Exam:  Physical Exam  Constitutional:       General: He is not in acute distress.     Appearance: He is ill-appearing.   Comments: sleeping.   HENT:      Mouth/Throat:      Mouth: Mucous membranes are dry.   Cardiovascular:      Rate and Rhythm: Regular rate. Rhythm irregular.   Abdominal:      General: There is no distension.      Palpations: Abdomen is soft.      Comments: PEG tube in place with TFs going at 40 cc/hr   Musculoskeletal:      Comments: LUE and LLE in contracture    Skin:     General: Skin is warm and dry.      Comments:  BLE with scattered superficial abrasions.    Neurological:      Comments: Non-sedated. Nonverbal at baseline.     Labs:  Recent Results (from the past 48 hours)   POCT glucose    Collection Time: 10/29/24 12:26 PM   Result Value Ref Range    POCT Glucose 101 70 - 110 mg/dL   POCT glucose    Collection Time: 10/29/24  5:55 PM   Result Value Ref Range    POCT Glucose 80 70 - 110 mg/dL   POCT glucose    Collection Time: 10/29/24  9:33 PM   Result Value Ref Range    POCT Glucose 46 (LL) 70 - 110 mg/dL   POCT glucose    Collection Time: 10/29/24  9:35 PM   Result Value Ref Range    POCT Glucose 112 (H) 70 - 110 mg/dL   Comprehensive Metabolic Panel    Collection Time: 10/30/24  8:07 AM   Result Value Ref " Range    Sodium 140 136 - 145 mmol/L    Potassium 3.5 3.5 - 5.1 mmol/L    Chloride 108 (H) 98 - 107 mmol/L    CO2 20 (L) 23 - 31 mmol/L    Glucose 42 (LL) 82 - 115 mg/dL    Blood Urea Nitrogen 5.8 (L) 8.4 - 25.7 mg/dL    Creatinine 0.58 (L) 0.72 - 1.25 mg/dL    Calcium 8.2 (L) 8.8 - 10.0 mg/dL    Protein Total 6.6 5.8 - 7.6 gm/dL    Albumin 2.3 (L) 3.4 - 4.8 g/dL    Globulin 4.3 (H) 2.4 - 3.5 gm/dL    Albumin/Globulin Ratio 0.5 (L) 1.1 - 2.0 ratio    Bilirubin Total 0.9 <=1.5 mg/dL     40 - 150 unit/L    ALT 10 0 - 55 unit/L    AST 18 5 - 34 unit/L    eGFR >60 mL/min/1.73/m2    Anion Gap 12.0 mEq/L    BUN/Creatinine Ratio 10    CBC with Differential    Collection Time: 10/30/24  8:07 AM   Result Value Ref Range    WBC 8.45 4.50 - 11.50 x10(3)/mcL    RBC 3.57 (L) 4.70 - 6.10 x10(6)/mcL    Hgb 9.4 (L) 14.0 - 18.0 g/dL    Hct 29.5 (L) 42.0 - 52.0 %    MCV 82.6 80.0 - 94.0 fL    MCH 26.3 (L) 27.0 - 31.0 pg    MCHC 31.9 (L) 33.0 - 36.0 g/dL    RDW 16.8 11.5 - 17.0 %    Platelet 250 130 - 400 x10(3)/mcL    MPV 10.3 7.4 - 10.4 fL    Neut % 43.9 %    Lymph % 27.6 %    Mono % 25.0 %    Eos % 1.5 %    Basophil % 0.7 %    Lymph # 2.33 0.6 - 4.6 x10(3)/mcL    Neut # 3.71 2.1 - 9.2 x10(3)/mcL    Mono # 2.11 (H) 0.1 - 1.3 x10(3)/mcL    Eos # 0.13 0 - 0.9 x10(3)/mcL    Baso # 0.06 <=0.2 x10(3)/mcL    IG# 0.11 (H) 0 - 0.04 x10(3)/mcL    IG% 1.3 %    NRBC% 0.2 %   POCT glucose    Collection Time: 10/30/24  8:56 AM   Result Value Ref Range    POCT Glucose 56 (L) 70 - 110 mg/dL   POCT glucose    Collection Time: 10/30/24  9:15 AM   Result Value Ref Range    POCT Glucose 69 (L) 70 - 110 mg/dL   POCT glucose    Collection Time: 10/30/24  9:25 AM   Result Value Ref Range    POCT Glucose 30 (LL) 70 - 110 mg/dL   POCT glucose    Collection Time: 10/30/24  9:26 AM   Result Value Ref Range    POCT Glucose 82 70 - 110 mg/dL   POCT glucose    Collection Time: 10/30/24 12:07 PM   Result Value Ref Range    POCT Glucose 97 70 - 110 mg/dL    POCT glucose    Collection Time: 10/30/24  5:54 PM   Result Value Ref Range    POCT Glucose 97 70 - 110 mg/dL   EKG 12-lead    Collection Time: 10/30/24  6:19 PM   Result Value Ref Range    QRS Duration 86 ms    [facility] QTC Calculation 489 ms   CBC with Differential    Collection Time: 10/31/24  3:18 AM   Result Value Ref Range    WBC 10.40 4.50 - 11.50 x10(3)/mcL    RBC 3.39 (L) 4.70 - 6.10 x10(6)/mcL    Hgb 8.8 (L) 14.0 - 18.0 g/dL    Hct 29.7 (L) 42.0 - 52.0 %    MCV 87.6 80.0 - 94.0 fL    MCH 26.0 (L) 27.0 - 31.0 pg    MCHC 29.6 (L) 33.0 - 36.0 g/dL    RDW 17.1 (H) 11.5 - 17.0 %    Platelet 236 130 - 400 x10(3)/mcL    MPV 10.0 7.4 - 10.4 fL    Neut % 35.2 %    Lymph % 47.6 %    Mono % 14.7 %    Eos % 1.4 %    Basophil % 0.7 %    Lymph # 4.95 (H) 0.6 - 4.6 x10(3)/mcL    Neut # 3.66 2.1 - 9.2 x10(3)/mcL    Mono # 1.53 (H) 0.1 - 1.3 x10(3)/mcL    Eos # 0.15 0 - 0.9 x10(3)/mcL    Baso # 0.07 <=0.2 x10(3)/mcL    IG# 0.04 0 - 0.04 x10(3)/mcL    IG% 0.4 %    NRBC% 0.0 %   POCT glucose    Collection Time: 10/31/24  5:48 AM   Result Value Ref Range    POCT Glucose 88 70 - 110 mg/dL   Comprehensive Metabolic Panel    Collection Time: 10/31/24  6:07 AM   Result Value Ref Range    Sodium 140 136 - 145 mmol/L    Potassium 3.7 3.5 - 5.1 mmol/L    Chloride 106 98 - 107 mmol/L    CO2 20 (L) 23 - 31 mmol/L    Glucose 49 (LL) 82 - 115 mg/dL    Blood Urea Nitrogen 6.6 (L) 8.4 - 25.7 mg/dL    Creatinine 0.66 (L) 0.72 - 1.25 mg/dL    Calcium 8.1 (L) 8.8 - 10.0 mg/dL    Protein Total 6.4 5.8 - 7.6 gm/dL    Albumin 2.2 (L) 3.4 - 4.8 g/dL    Globulin 4.2 (H) 2.4 - 3.5 gm/dL    Albumin/Globulin Ratio 0.5 (L) 1.1 - 2.0 ratio    Bilirubin Total 0.8 <=1.5 mg/dL     40 - 150 unit/L    ALT 10 0 - 55 unit/L    AST 17 5 - 34 unit/L    eGFR >60 mL/min/1.73/m2    Anion Gap 14.0 mEq/L    BUN/Creatinine Ratio 10    POCT glucose    Collection Time: 10/31/24  7:26 AM   Result Value Ref Range    POCT Glucose 200 (H) 70 - 110 mg/dL        Imaging:  X-Ray Abdomen AP 1 View    Result Date: 10/27/2024  EXAMINATION XR ABDOMEN AP 1 VIEW CLINICAL HISTORY r/o obstruction/ileus- not tolerating TF; TECHNIQUE A total of 2 AP image(s) of the abdomen. COMPARISON 21 July 2024 FINDINGS Lines/tubes/devices: Left upper quadrant percutaneous gastrostomy tube remains in place.  Cardiac pacemaker/ICD leads again partially visualized.  Fernandez catheter projects over the pelvis. A non-obstructed bowel gas pattern is present. No intra-abdominal mass effect is appreciated. Detection of air-fluid levels and low-volume pneumoperitoneum is limited due to supine technique. No convincing acute abnormality of the visualized lower thoracic cavity.  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  No definite acutely displaced fracture or joint malalignment is identified. IMPRESSION 1. No convincing acute intra-abdominal process. 2. Lines/tubes as above. 3. 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 Electronically signed by: Isaac Carcamo Date:    10/27/2024 Time:    13:56    X-Ray Chest 1 View    Result Date: 10/25/2024  EXAMINATION: XR CHEST 1 VIEW CLINICAL HISTORY: intubated; COMPARISON: 20 October 2024 FINDINGS: Frontal view of the chest was obtained.  Tracheostomy remains.  Heart and mediastinum unchanged.  No new focal consolidation or pneumothorax.     No acute findings. Electronically signed by: Tani Calderon Date:    10/25/2024 Time:    07:34    Echo    Result Date: 10/22/2024    Left Ventricle: The left ventricle is normal in size. Mildly increased wall thickness. There is normal systolic function with a visually estimated ejection fraction of 65%. Grade I diastolic dysfunction.   Right Ventricle: Normal right ventricular cavity size. Systolic function is normal.   Aortic Valve: There is mild aortic valve sclerosis.   Mitral Valve:  There is mild (1+) regurgitation.   Tricuspid Valve: There is mild (1+) regurgitation.   The estimated pulmonary artery systolic pressure is 21 mmHg.   AICD/pacemaker lead noted.  No evidence of vegetation noted.   No evidence of valvular endocarditis noted.  If clinical suspicion is high would recommend transesophageal echocardiogram.     CT Abdomen Pelvis With IV Contrast NO Oral Contrast    Result Date: 10/21/2024  EXAMINATION: CT ABDOMEN PELVIS WITH IV CONTRAST CLINICAL HISTORY: sacral ulcer with possible abscess/osteomyelitis; TECHNIQUE: Low dose axial images, sagittal and coronal reformations were obtained from the lung bases to the pubic symphysis following the IV administration of contrast. Automatic exposure control (AEC) is utilized to reduce patient radiation exposure. COMPARISON: 08/19/2024 FINDINGS: There are interstitial infiltrate seen in the lungs bilaterally with developing areas of consolidation in the lower lobes.  These are worse than prior examination. There is a gastrostomy tube in the stomach. The liver appears normal.  No liver mass or lesion is seen.  Portal and hepatic veins appear normal. The gallbladder appears normal.  No obvious gallstones are seen.  No biliary dilatation is seen.  No pericholecystic fluid is seen. The pancreas appears normal.  No pancreatic mass or lesion is seen. The spleen shows no acute abnormality. The adrenal glands appear normal.  No adrenal nodule is seen. The kidneys appear normal in size.  There is a cyst in the midpole of the right kidney.  There is a cyst in the midpole the left kidney.  No hydronephrosis is seen.  No hydroureter is seen.  No nephrolithiasis is seen.  No obvious ureteral stones are seen. The urinary bladder wall is diffusely thickened and there are inflammatory changes associated with the urinary bladder.  Findings are consistent with cystitis. No colitis is seen.  No diverticulitis is seen.  No obvious colonic mass or lesion is seen. No  free air is seen.  No free fluid is seen. There is a sacral decubitus seen just to the right of the superior gluteal fold.  This is new since the prior examination.  No abscess or fluid collection is seen.  Some skin thickening inflammatory changes are seen.  No convincing evidence of osteomyelitis is seen.     Interval development of sacral decubitus just to the right of the superior aspect of the vertebral fold.  No abscess or fluid collection is seen Findings seen consistent with urinary bladder wall thickening and inflammatory changes consistent with cystitis Interval development of bilateral patchy areas of pneumonia with developing consolidation in the lower lobes Electronically signed by: Erick Grant Date:    10/21/2024 Time:    17:22    X-Ray Chest AP Portable    Result Date: 10/20/2024  EXAMINATION: XR CHEST AP PORTABLE CLINICAL HISTORY: Sepsis, unspecified organism TECHNIQUE: One view COMPARISON: August 21, 2020. FINDINGS: Cardiopericardial silhouette appearance is similar.  Tracheostomy cannula is in similar location.  Right chest implanted cardiac device electrodes terminate within the right atrium and right ventricle.  No acute dense focal or segmental consolidation, congestive process, pleural effusions or pneumothorax.     No acute cardiopulmonary process identified. Electronically signed by: Sami Taylor Date:    10/20/2024 Time:    19:36         Assessment/Plan:    68 y.o. male known to Dr. Escalona from inpatient care (primary GI is Dr. 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   Admitted 10/20/24 for sepsis related to VRE enterococcus,  ESBL Klebsiella bacteremia, Klebsiella pneumoniae and Proteus mirabilis pneumonia.; UTI; AFib with RVR.  Patient with  multiple prior admissions during which our group was consulted for complaints of coffee-ground emesis and tube feed intolerance.      TF intolerance / vomiting  s/p EGD w/J-tube extension 10/30    - Continue TFs and advance as tolerated to goal as per set orders.   - PEG site care.   - Keep abdominal binder in place at all times to prevent dislodgement.    PEG looks good and is without malfunction.  Please call GI if needed.     Sujey Ortiz PA-C

## 2024-11-01 LAB
DIGOXIN SERPL-MCNC: <0.2 NG/ML (ref 0.8–2)
POCT GLUCOSE: 101 MG/DL (ref 70–110)
POCT GLUCOSE: 102 MG/DL (ref 70–110)
POCT GLUCOSE: 97 MG/DL (ref 70–110)
POCT GLUCOSE: 99 MG/DL (ref 70–110)

## 2024-11-01 PROCEDURE — 94003 VENT MGMT INPAT SUBQ DAY: CPT

## 2024-11-01 PROCEDURE — 63600175 PHARM REV CODE 636 W HCPCS: Performed by: INTERNAL MEDICINE

## 2024-11-01 PROCEDURE — 63600175 PHARM REV CODE 636 W HCPCS: Performed by: NURSE PRACTITIONER

## 2024-11-01 PROCEDURE — 20000000 HC ICU ROOM

## 2024-11-01 PROCEDURE — 63600175 PHARM REV CODE 636 W HCPCS

## 2024-11-01 PROCEDURE — 27100171 HC OXYGEN HIGH FLOW UP TO 24 HOURS

## 2024-11-01 PROCEDURE — 25000003 PHARM REV CODE 250: Performed by: GENERAL PRACTICE

## 2024-11-01 PROCEDURE — 27000207 HC ISOLATION

## 2024-11-01 PROCEDURE — 87077 CULTURE AEROBIC IDENTIFY: CPT | Performed by: INTERNAL MEDICINE

## 2024-11-01 PROCEDURE — 99900031 HC PATIENT EDUCATION (STAT)

## 2024-11-01 PROCEDURE — 25000003 PHARM REV CODE 250

## 2024-11-01 PROCEDURE — 87181 SC STD AGAR DILUTION PER AGT: CPT | Performed by: INTERNAL MEDICINE

## 2024-11-01 PROCEDURE — 99900026 HC AIRWAY MAINTENANCE (STAT)

## 2024-11-01 PROCEDURE — 94760 N-INVAS EAR/PLS OXIMETRY 1: CPT

## 2024-11-01 PROCEDURE — 63600175 PHARM REV CODE 636 W HCPCS: Performed by: GENERAL PRACTICE

## 2024-11-01 PROCEDURE — 99900035 HC TECH TIME PER 15 MIN (STAT)

## 2024-11-01 RX ORDER — DIGOXIN 0.25 MG/ML
250 INJECTION INTRAMUSCULAR; INTRAVENOUS ONCE
Status: COMPLETED | OUTPATIENT
Start: 2024-11-01 | End: 2024-11-01

## 2024-11-01 RX ORDER — DIGOXIN 0.25 MG/ML
500 INJECTION INTRAMUSCULAR; INTRAVENOUS ONCE
Status: COMPLETED | OUTPATIENT
Start: 2024-11-01 | End: 2024-11-01

## 2024-11-01 RX ADMIN — PANTOPRAZOLE SODIUM 40 MG: 40 INJECTION, POWDER, LYOPHILIZED, FOR SOLUTION INTRAVENOUS at 07:11

## 2024-11-01 RX ADMIN — LINEZOLID 600 MG: 600 INJECTION, SOLUTION INTRAVENOUS at 08:11

## 2024-11-01 RX ADMIN — DIGOXIN 250 MCG: 0.25 INJECTION INTRAMUSCULAR; INTRAVENOUS at 05:11

## 2024-11-01 RX ADMIN — DIGOXIN 500 MCG: 250 INJECTION, SOLUTION INTRAMUSCULAR; INTRAVENOUS; PARENTERAL at 12:11

## 2024-11-01 RX ADMIN — ATORVASTATIN CALCIUM 10 MG: 10 TABLET, FILM COATED ORAL at 07:11

## 2024-11-01 RX ADMIN — ACETAMINOPHEN 650 MG: 325 TABLET ORAL at 09:11

## 2024-11-01 RX ADMIN — QUETIAPINE FUMARATE 50 MG: 25 TABLET ORAL at 07:11

## 2024-11-01 RX ADMIN — AMIODARONE HYDROCHLORIDE 150 MG: 1.5 INJECTION, SOLUTION INTRAVENOUS at 09:11

## 2024-11-01 RX ADMIN — QUETIAPINE FUMARATE 50 MG: 25 TABLET ORAL at 09:11

## 2024-11-01 RX ADMIN — LINEZOLID 600 MG: 600 INJECTION, SOLUTION INTRAVENOUS at 09:11

## 2024-11-01 RX ADMIN — SODIUM CHLORIDE 2 G: 9 INJECTION, SOLUTION INTRAVENOUS at 11:11

## 2024-11-01 RX ADMIN — AMIODARONE HYDROCHLORIDE 1 MG/MIN: 1.8 INJECTION, SOLUTION INTRAVENOUS at 09:11

## 2024-11-01 RX ADMIN — SODIUM CHLORIDE 2 G: 9 INJECTION, SOLUTION INTRAVENOUS at 03:11

## 2024-11-01 RX ADMIN — RIVAROXABAN 20 MG: 10 TABLET, FILM COATED ORAL at 09:11

## 2024-11-01 RX ADMIN — SODIUM CHLORIDE 2 G: 9 INJECTION, SOLUTION INTRAVENOUS at 07:11

## 2024-11-01 NOTE — PROGRESS NOTES
[facility] - [facility]    Cardiology  Progress Note    Patient Name: Delio Daniel Jr.  MRN: [id number]  Admission Date: 10/20/2024  Hospital Length of Stay: 12 days  Code Status: Full Code   Attending Provider: Itz Francisco MD   Consulting Provider: TRUMAN Erickson  Primary Care Physician: Jl Briones MD  Principal Problem:UTI (urinary tract infection)    Patient information was obtained from past medical records, ER records, and primary team.     Subjective:   Chief Complaint/Reason for Consult: AF    HPI:   Mr. Daniel is a 68 y/o male with a history of AFIB on Xarelto, CVA, SSS, HTN, HLD, CAD, who is known to [facility], Dr. Kimbrough. He presented to the ER on 8.3.24 from [facility] with decreased responsiveness, severe sepsis, and recurrent UTI. 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 Patient nonverbal at baseline. In the ED, patient was febrile, tachycardic with -190s, tachypneic, /60. EKG showed Afib with RVR. Diltiazem drip started in ED for acute HR Control. Patient required mechanical ventilation and admitted to the ICU. Patient treated for sepsis related to UTI. [facility] consulted for AF Management.     Hospital Course:  10.22.24: Resting in bed. AF CVR HR 70's. On Amiodarone Infusion. BP Controlled. Trach/Vented, FIO2 40%.  11.1.24: NAD Noted. BP Low Normal. Beta blockade held a few days back due to marginal BP. On Amiodarone Infusion for acute HR Control. AF RVR. Re consulted for AF Management.     PMH: Arthritis, AF, BPH, Cardiac Arrest, CAD, Renal Cyst, Diverticulosis, Hyperlipidemia, Hypertension, MI, Obesity, Lever Steatosis, Stroke  PSH: Pacemaker, LHC, Colonoscopy, EGD, Colon Excision, Tracheostomy  Family History: Mother - HTN; Sister - HTN; Brother - HTN   Social History: Tobacco- Negative, Alcohol- Negative, Substance Abuse- Negative    Previous Cardiac Diagnostics:   Echocardiogram  (10.22.24):  Left Ventricle: The left ventricle is normal in size. Mildly increased wall thickness. There is normal systolic function with a visually estimated ejection fraction of 65%. Grade I diastolic dysfunction.  Right Ventricle: Normal right ventricular cavity size. Systolic function is normal.  Aortic Valve: There is mild aortic valve sclerosis.  Mitral Valve: There is mild (1+) regurgitation.  Tricuspid Valve: There is mild (1+) regurgitation.  The estimated pulmonary artery systolic pressure is 21 mmHg.  AICD/pacemaker lead noted.  No evidence of vegetation noted.  No evidence of valvular endocarditis noted.  If clinical suspicion is high would recommend transesophageal echocardiogram.    Echocardiogram (5.3.24):  EF 65%  RV with Normal RV Function.  MR- Mild. TR- Mild.   Pericardial Effusion- None. Anterior Fat Pad Noted.     ECHO (1.15.24):  TDS. No Definity contrast available for use. Left Ventricle: The left ventricle is normal in size. Moderately increased wall thickness. Normal wall motion. There is normal systolic function. Ejection fraction by visual approximation is 55%. Right Ventricle: Normal right ventricular cavity size. Systolic function is borderline low. Left Atrium: Left atrium is severely dilated. Right Atrium: Right atrium is mildly dilated. Mitral Valve: There is bileaflet sclerosis. There is mild regurgitation. IVC/SVC: Normal venous pressure at 3 mmHg.     Venous US LUE (12.26.23):  There was no evidence of deep vein thrombosis in the left upper extremity.   A superficial thrombosis was identified in the left cephalic vein.      Carotid US (12.19.23):  The right internal carotid artery demonstrated less than 50% stenosis.  The left internal carotid artery demonstrated less than 50% stenosis.  The bilateral vertebral arteries were patent with antegrade flow.  Bilateral internal carotid arteries demonstrated decreased velocities starting from the common carotid arteries.      [facility]  (5.25.18):   LM: Normal. Normal size and bifurcation. LAD: Abnormal. Large, mild atherosclerotic plaque, moderately large diagonals. Mid LAD 30% stenosis. The lesion was tubular and eccentric. LCX: abnormal and Large, mild atherosclerotic plaque, large OM with mild narrowing.OM 1 35% stenosis. RCA: normal. Large and no significant disease.        Review of patient's allergies indicates:  No Known Allergies  No current facility-administered medications on file prior to encounter.     Current Outpatient Medications on File Prior to Encounter   Medication Sig    ascorbic Acid (VITAMIN C) 500 mg CpSR 500 mg 2 (two) times daily. Per PEG tube    busPIRone (BUSPAR) 5 MG Tab 5 mg by Per G Tube route 3 (three) times daily.    cholestyramine (QUESTRAN) 4 gram packet 4 g once daily. Via PEG tube    cholestyramine-aspartame (QUESTRAN LIGHT) 4 gram PwPk 1 packet (4 g total) by Per G Tube route 2 (two) times daily.    diltiaZEM (CARDIZEM) 60 MG tablet 60 mg by Per G Tube route every 6 (six) hours.    ferrous sulfate 300 mg (60 mg iron)/5 mL syrup Take 5 mLs (300 mg total) by mouth once daily.    finasteride (PROSCAR) 5 mg tablet Take 1 tablet (5 mg total) by mouth once daily.    furosemide (LASIX) 80 MG tablet 80 mg by Per G Tube route as needed (for Edema).    L. acidophilus/L.bulgaricus (FLORANEX ORAL) 1 packet by PEG Tube route Daily.    levetiracetam 500 mg/5 mL (5 mL) Soln 1,500 mg by Per G Tube route 2 (two) times daily. Nursing home reports medication hold by MD until level redraw on Monday.    LIPITOR 10 mg tablet 10 mg by Per G Tube route once daily.    metoclopramide HCl (REGLAN) 5 mg/5 mL Soln 10 mg by Per G Tube route 3 (three) times daily before meals.    metoprolol tartrate (LOPRESSOR) 100 MG tablet 100 mg by Per G Tube route 2 (two) times daily.    miconazole NITRATE 2 % (MICOTIN) 2 % top powder Apply topically 2 (two) times daily.    multivitamin (THERAGRAN) per tablet 1 tablet by Per G Tube route once daily.     pantoprazole (PROTONIX) 40 mg injection Inject 40 mg into the vein 2 (two) times daily.    polyethylene glycol (GLYCOLAX) 17 gram PwPk Take 17 g by mouth 2 (two) times daily as needed for Constipation.    protein supplement (PROMOD PROTEIN ORAL) 30 mLs by Per G Tube route once daily.    QUEtiapine (SEROQUEL) 25 MG Tab 25 mg by Per G Tube route 2 (two) times daily.    scopolamine (TRANSDERM-SCOP) 1.3-1.5 mg (1 mg over 3 days) Place 1 patch onto the skin Every 3 (three) days.    sucralfate (CARAFATE) 1 gram tablet 1 tablet (1 g total) by Per G Tube route 4 (four) times daily before meals and nightly.    tamsulosin (FLOMAX) 0.4 mg Cap Take 1 capsule (0.4 mg total) by mouth every evening.    venlafaxine 75 mg TR24 1 tablet by Per G Tube route 2 (two) times a day.    XARELTO 20 mg Tab 1 tablet by Per G Tube route nightly.     Review of Systems   Unable to perform ROS: Patient nonverbal     Objective:     Vital Signs (Most Recent):  Temp: 98.8 °F (37.1 °C) (11/01/24 0800)  Pulse: (!) 144 (11/01/24 0805)  Resp: (!) 25 (11/01/24 0805)  BP: (!) 85/63 (11/01/24 0805)  SpO2: 100 % (11/01/24 0805) Vital Signs (24h Range):  Temp:  [98.1 °F (36.7 °C)-98.9 °F (37.2 °C)] 98.8 °F (37.1 °C)  Pulse:  [] 144  Resp:  [1-25] 25  SpO2:  [100 %] 100 %  BP: ()/(60-90) 85/63   Weight: 69.1 kg (152 lb 5.4 oz)  Body mass index is 22.49 kg/m².  SpO2: 100 %       Intake/Output Summary (Last 24 hours) at 11/1/2024 1041  Last data filed at 11/1/2024 1000  Gross per 24 hour   Intake 2525.09 ml   Output 2260 ml   Net 265.09 ml     Lines/Drains/Airways       Drain  Duration                  Urethral Catheter 10/20/24 2210 Silicone 16 Fr. 11 days         Rectal Tube 10/25/24 2030 6 days         Gastrostomy/Enterostomy 10/30/24 1200 Gastrostomy-jejunostomy feeding 1 day              Airway  Duration             Adult Surgical Airway 08/19/24 0120 Wendi Extra Large Cuffed Distal 6.0/ 75mm 74 days              Peripheral Intravenous Line   "Duration                  Midline Catheter - Single Lumen 10/20/24 1530 Right 11 days         Peripheral IV - Single Lumen 10/20/24 1600 18 G Anterior;Distal;Left Upper Arm 11 days                  Significant Labs:   Chemistries:   Recent Labs   Lab 10/26/24  0642 10/26/24  1622 10/27/24  0348 10/28/24  0711 10/29/24  0335 10/30/24  0807 10/31/24  0607   *   < > 144 144 142 140 140   K 3.5   < > 3.5 3.2* 2.9* 3.5 3.7   *   < > 108* 108* 106 108* 106   CO2 23   < > 20* 25 22* 20* 20*   BUN 13.6   < > 14.3 11.3 7.0* 5.8* 6.6*   CREATININE 0.57*   < > 0.59* 0.61* 0.58* 0.58* 0.66*   CALCIUM 7.8*   < > 8.0* 7.7* 8.3* 8.2* 8.1*   BILITOT 0.8   < > 0.9 0.9 1.1 0.9 0.8   ALKPHOS 102   < > 109 108 117 120 123   ALT 12   < > 12 11 11 10 10   AST 18   < > 19 19 19 18 17   GLUCOSE 48*   < > 35* 68* 61* 42* 49*   MG 2.00  --  1.90 1.80  --   --   --     < > = values in this interval not displayed.        CBC/Anemia Labs: Coags:    Recent Labs   Lab 10/29/24  0335 10/30/24  0807 10/31/24  0318   WBC 8.20 8.45 10.40   HGB 9.4* 9.4* 8.8*   HCT 30.5* 29.5* 29.7*    250 236   MCV 82.7 82.6 87.6   RDW 16.4 16.8 17.1*    No results for input(s): "PT", "INR", "APTT" in the last 168 hours.     Significant Imaging:  Imaging Results              X-Ray Chest AP Portable (Final result)  Result time 10/20/24 19:36:31      Final result by Sami Taylor MD (10/20/24 19:36:31)                   Impression:      No acute cardiopulmonary process identified.      Electronically signed by: Sami Taylor  Date:    10/20/2024  Time:    19:36               Narrative:    EXAMINATION:  XR CHEST AP PORTABLE    CLINICAL HISTORY:  Sepsis, unspecified organism    TECHNIQUE:  One view    COMPARISON:  August 21, 2020.    FINDINGS:  Cardiopericardial silhouette appearance is similar.  Tracheostomy cannula is in similar location.  Right chest implanted cardiac device electrodes terminate within the right atrium and right ventricle.  No " acute dense focal or segmental consolidation, congestive process, pleural effusions or pneumothorax.                                      Telemetry:  AF RVR    Physical Exam  Vitals and nursing note reviewed.   Constitutional:       General: He is not in acute distress.     Appearance: He is ill-appearing.   Neck:      Comments: Tracheostomy  Cardiovascular:      Rate and Rhythm: Tachycardia present. Rhythm irregular.   Pulmonary:      Effort: Pulmonary effort is normal. No respiratory distress.      Comments: Mechanical Ventilation- Vent associated breath sounds.   Abdominal:      Palpations: Abdomen is soft.   Musculoskeletal:      Right lower leg: No edema.      Left lower leg: No edema.      Comments: Legs are Warm.   Skin:     General: Skin is warm and dry.   Neurological:      Mental Status: Mental status is at baseline.       Home Medications:   No current facility-administered medications on file prior to encounter.     Current Outpatient Medications on File Prior to Encounter   Medication Sig Dispense Refill    ascorbic Acid (VITAMIN C) 500 mg CpSR 500 mg 2 (two) times daily. Per PEG tube      busPIRone (BUSPAR) 5 MG Tab 5 mg by Per G Tube route 3 (three) times daily.      cholestyramine (QUESTRAN) 4 gram packet 4 g once daily. Via PEG tube      cholestyramine-aspartame (QUESTRAN LIGHT) 4 gram PwPk 1 packet (4 g total) by Per G Tube route 2 (two) times daily. 180 packet 3    diltiaZEM (CARDIZEM) 60 MG tablet 60 mg by Per G Tube route every 6 (six) hours.      ferrous sulfate 300 mg (60 mg iron)/5 mL syrup Take 5 mLs (300 mg total) by mouth once daily. 450 mL 0    finasteride (PROSCAR) 5 mg tablet Take 1 tablet (5 mg total) by mouth once daily. 30 tablet 11    furosemide (LASIX) 80 MG tablet 80 mg by Per G Tube route as needed (for Edema).      L. acidophilus/L.bulgaricus (FLORANEX ORAL) 1 packet by PEG Tube route Daily.      levetiracetam 500 mg/5 mL (5 mL) Soln 1,500 mg by Per G Tube route 2 (two) times  daily. Nursing home reports medication hold by MD until level redraw on Monday.      LIPITOR 10 mg tablet 10 mg by Per G Tube route once daily.      metoclopramide HCl (REGLAN) 5 mg/5 mL Soln 10 mg by Per G Tube route 3 (three) times daily before meals.      metoprolol tartrate (LOPRESSOR) 100 MG tablet 100 mg by Per G Tube route 2 (two) times daily.      miconazole NITRATE 2 % (MICOTIN) 2 % top powder Apply topically 2 (two) times daily.      multivitamin (THERAGRAN) per tablet 1 tablet by Per G Tube route once daily.      pantoprazole (PROTONIX) 40 mg injection Inject 40 mg into the vein 2 (two) times daily.      polyethylene glycol (GLYCOLAX) 17 gram PwPk Take 17 g by mouth 2 (two) times daily as needed for Constipation.      protein supplement (PROMOD PROTEIN ORAL) 30 mLs by Per G Tube route once daily.      QUEtiapine (SEROQUEL) 25 MG Tab 25 mg by Per G Tube route 2 (two) times daily.      scopolamine (TRANSDERM-SCOP) 1.3-1.5 mg (1 mg over 3 days) Place 1 patch onto the skin Every 3 (three) days.      sucralfate (CARAFATE) 1 gram tablet 1 tablet (1 g total) by Per G Tube route 4 (four) times daily before meals and nightly.      tamsulosin (FLOMAX) 0.4 mg Cap Take 1 capsule (0.4 mg total) by mouth every evening. 30 capsule 11    venlafaxine 75 mg TR24 1 tablet by Per G Tube route 2 (two) times a day.      XARELTO 20 mg Tab 1 tablet by Per G Tube route nightly.       Current Schedule Inpatient Medications:   atorvastatin  10 mg Per G Tube Daily    linezolid  600 mg Intravenous Q12H    meropenem IV (PEDS and ADULTS)  2 g Intravenous Q8H    pantoprazole  40 mg Intravenous Daily    QUEtiapine  50 mg Per G Tube BID    rivaroxaban  20 mg Per G Tube Nightly    scopolamine  1 patch Transdermal Q3 Days     Continuous Infusions:   amiodarone in dextrose 5%  1 mg/min Intravenous Continuous 33.3 mL/hr at 11/01/24 0933 1 mg/min at 11/01/24 0933    amiodarone in dextrose 5%  0.5 mg/min Intravenous Continuous            Assessment:   Persistent AF - Currently AF RVR    - [id number] Score 4 (4.8% Stroke Risk per Year)    - on Xarelto for Stroke Risk Reduction  CAD    - [facility] (5.25.18): LM: Normal. Normal size and bifurcation. LAD: Abnormal. Large, mild atherosclerotic plaque, moderately large diagonals. Mid LAD 30% stenosis. The lesion was tubular and eccentric. LCX: abnormal and Large, mild atherosclerotic plaque, large OM with mild narrowing.OM 1 35% stenosis. RCA: normal. Large and no significant disease.      - EF 65%  NSTEMI Type II due to Sepsis/UTI   SSS    - Dual Chamber ICD  History of Hypertension with Transient Episodes of Hypotension (Beta Blockade on hold)  Hyperlipidemia  Sepsis/ E. Faecium VRE & ESBL Klebsiella Bacteremia     - Leukocytosis    - Chest XR: No Acute Process    - Sacral Wound  UTI  Anemia   History of Hemorrhagic CVA (12/2023)    - Residual Left Sided Deficits    - S/P Peg Tube and Trach   Small Left Pleural Effusion   Arthritis  Dysphagia    - s/p PEG   BRIAN (Resolved)  History of Seizure Disorder  Liver Steatosis  Diverticulosis  BPH  Depression  Obesity   No known history of GI Bleed     Plan:   Continue Amiodarone Infusion Per Protocol  Give Digoxin 0.5 Mg IVP x 1 Dose now, then 0.25 Mg IVP in 6 Hours.  Hold Beta Blockade given Marginal BP  On Xarelto for stroke risk reduction  No evidence of valvular endocarditis noted.  If clinical suspicion is high would recommend transesophageal echocardiogram. Will defer to Primary Team and Consider if deemed warranted.     Leopodlo Mullins, TRUMAN  Cardiology  [facility] - [facility] ICU  11/01/2024

## 2024-11-01 NOTE — PROGRESS NOTES
[facility] - Emergency Dept  Pulmonary Critical Care Note    Patient Name: Delio Daniel Jr.  MRN: [id number]  Admission Date: 10/20/2024  Hospital Length of Stay: 12 days  Code Status: Full Code  Attending Provider: Itz Francisco MD  Primary Care Provider: Jl Briones MD     Subjective:     HPI:   Patient is a 70 y/o male with extensive PMH who presented from NH on 10/20/24 with decreased responsiveness, severe sepsis, and recurrent UTI. 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     Patient transferred to ED from [facility] with lethargy and tachycardia. Family at bedside reports patient is nonverbal at baseline but typically more alert. In the ED, patient is febrile, tachycardic with -190s, tachypneic, /60. EKG shows Afib with RVR. Diltiazem drip started in ED. Labs are significant for WBC of 16.5, lactic acid of 3.3, Cr 1.48, BUN 45.3, Na 155, K+ 5.1, troponin elevated at 0.118. UA with evidence of UTI. Of note, pt was being treated outpatient for a UTI, currently on day 4 of 7 day Rocephin course. Urine culture 10/16/24 with multidrug resistant Klebsiella pneumonia and Proteus mirabilis with susceptibility to Cefepime. Patient received 3L of NS and initiated on Vanc and Cefepime in ED. Admitted to the ICU on mechanical ventilation via trach.    Hospital Course/Significant events:  10/20/24: Admitted to ICU for severe sepsis     24 Hour Interval History:  No acute events overnight.  Underwent PEG tube extension on 10/30 by Gastroenterology.  Currently advancing as tolerated.  Sugars have normalized.    Past Medical History:   Diagnosis Date    Arthritis     Atrial  fibrillation     BPH (benign prostatic hyperplasia)     Cardiac arrest     Coronary artery disease     Cyst, kidney, acquired     Diverticulosis     Hyperlipidemia     Hypertension     MI (myocardial infarction)     Obesity     Steatosis of liver     Stroke        Past Surgical History:   Procedure Laterality Date    A-V CARDIAC PACEMAKER INSERTION Right     CARDIAC CATHETERIZATION      COLONOSCOPY W/ BIOPSIES      CRANIECTOMY Right 12/20/2023    Procedure: CRANIECTOMY;  Surgeon: Artem Can MD;  Location: [facility];  Service: Neurosurgery;  Laterality: Right;    ESOPHAGOGASTRODUODENOSCOPY W/ PEG N/A 1/2/2024    Procedure: PEG;  Surgeon: Tani Day MD;  Location: [facility] ENDOSCOPY;  Service: Gastroenterology;  Laterality: N/A;    ESOPHAGOGASTRODUODENOSCOPY W/ PEG N/A 10/30/2024    Procedure: EGD w/ Jtube placement;  Surgeon: Gabriele Salcido MD;  Location: [facility] ENDOSCOPY;  Service: Endoscopy;  Laterality: N/A;  with J tube extension    excision of colon      TRACHEOSTOMY N/A 12/29/2023    Procedure: CREATION, TRACHEOSTOMY;  Surgeon: Patricia Winslow MD;  Location: [facility];  Service: ENT;  Laterality: N/A;  REQ 1130 //  NEEDS 2 SCRUBS       Social History     Socioeconomic History    Marital status:     Number of children: 9   Occupational History    Occupation: retired   Tobacco Use    Smoking status: Never    Smokeless tobacco: Never   Substance and Sexual Activity    Alcohol use: Not Currently    Drug use: Not Currently    Sexual activity: Not Currently     Partners: Female     Social Drivers of Health     Financial Resource Strain: Patient Unable To Answer (10/21/2024)    Overall Financial Resource Strain (CARDIA)     Difficulty of Paying Living Expenses: Patient unable to answer   Food Insecurity: Patient Unable To Answer (10/21/2024)    Hunger Vital Sign     Worried About Running Out of Food in the Last Year: Patient unable to answer     Ran Out of Food in the Last Year: Patient unable  to answer   Transportation Needs: Patient Unable To Answer (10/21/2024)    TRANSPORTATION NEEDS     Transportation : Patient unable to answer   Physical Activity: Sufficiently Active (8/5/2024)    Exercise Vital Sign     Days of Exercise per Week: 5 days     Minutes of Exercise per Session: 30 min   Stress: Patient Unable To Answer (10/21/2024)    [employer] - Occupational Stress Questionnaire     Feeling of Stress : Patient unable to answer   Housing Stability: Patient Unable To Answer (10/21/2024)    Housing Stability Vital Sign     Unable to Pay for Housing in the Last Year: Patient unable to answer     Homeless in the Last Year: Patient unable to answer       Current Outpatient Medications   Medication Instructions    ascorbic Acid (VITAMIN C) 500 mg, 2 times daily, Per PEG tube    busPIRone (BUSPAR) 5 mg, Per G Tube, 3 times daily    cholestyramine (QUESTRAN) 4 gram packet 4 g, Daily, Via PEG tube    cholestyramine-aspartame (QUESTRAN LIGHT) 4 gram PwPk 4 g, Per G Tube, 2 times daily    diltiaZEM (CARDIZEM) 60 mg, Per G Tube, Every 6 hours    ferrous sulfate 300 mg, Oral, Daily    finasteride (PROSCAR) 5 mg, Oral, Daily    furosemide (LASIX) 80 mg, Per G Tube, As needed (PRN)    L. acidophilus/L.bulgaricus (FLORANEX ORAL) 1 packet, PEG Tube, Daily    levetiracetam 1,500 mg, Per G Tube, 2 times daily, Nursing home reports medication hold by MD until level redraw on Monday.    LIPITOR 10 mg, Per G Tube, Daily    metoclopramide HCl (REGLAN) 10 mg, Per G Tube, 3 times daily before meals    metoprolol tartrate (LOPRESSOR) 100 mg, Per G Tube, 2 times daily    miconazole NITRATE 2 % (MICOTIN) 2 % top powder Topical (Top), 2 times daily    multivitamin (THERAGRAN) per tablet 1 tablet, Per G Tube, Daily    pantoprazole (PROTONIX) 40 mg, Intravenous, 2 times daily    polyethylene glycol (GLYCOLAX) 17 g, Oral, 2 times daily PRN    protein supplement (PROMOD PROTEIN ORAL) 30 mLs, Per G Tube,  Daily    QUEtiapine (SEROQUEL) 25 mg, Per G Tube, 2 times daily    scopolamine (TRANSDERM-SCOP) 1.3-1.5 mg (1 mg over 3 days) 1 patch, Transdermal, Every 3 days    sucralfate (CARAFATE) 1 g, Per G Tube, Before meals & nightly    tamsulosin (FLOMAX) 0.4 mg, Oral, Nightly    venlafaxine 75 mg TR24 1 tablet, Per G Tube, 2 times daily    XARELTO 20 mg Tab 1 tablet, Per G Tube, Nightly       Current Inpatient Medications   atorvastatin  10 mg Per G Tube Daily    linezolid  600 mg Intravenous Q12H    meropenem IV (PEDS and ADULTS)  2 g Intravenous Q8H    pantoprazole  40 mg Intravenous Daily    QUEtiapine  50 mg Per G Tube BID    rivaroxaban  20 mg Per G Tube Nightly    scopolamine  1 patch Transdermal Q3 Days       Current Intravenous Infusions    Review of Systems   Unable to perform ROS: Mental status change        Objective:       Intake/Output Summary (Last 24 hours) at 11/1/2024 0622  Last data filed at 10/31/2024 1810  Gross per 24 hour   Intake 3094.81 ml   Output 1850 ml   Net 1244.81 ml         Vital Signs (Most Recent):  Temp: 98.9 °F (37.2 °C) (11/01/24 0300)  Pulse: 106 (11/01/24 0500)  Resp: (!) 7 (11/01/24 0500)  BP: 105/72 (11/01/24 0500)  SpO2: 100 % (11/01/24 0500)  Body mass index is 22.49 kg/m².  Weight: 69.1 kg (152 lb 5.4 oz) Vital Signs (24h Range):  Temp:  [97.6 °F (36.4 °C)-98.9 °F (37.2 °C)] 98.9 °F (37.2 °C)  Pulse:  [] 106  Resp:  [1-24] 7  SpO2:  [77 %-100 %] 100 %  BP: ()/(60-90) 105/72     Physical Exam  Constitutional:       General: He is not in acute distress.     Appearance: He is ill-appearing.      Comments: Thin, Chronically ill-appearing   HENT:      Mouth/Throat:      Mouth: Mucous membranes are dry.   Cardiovascular:      Rate and Rhythm: Tachycardia present. Rhythm irregular.   Abdominal:      General: There is no distension.      Palpations: Abdomen is soft.      Comments: PEG tube in place    Musculoskeletal:      Comments: LUE and LLE in contracture    Skin:      "General: Skin is warm and dry.      Comments: Large sacral pressure ulcer. BLE with scattered superficial abrasions.    Neurological:      Comments: Non-sedated. Nonverbal at baseline.  Does not follow commands. Winces and withdraws to painful stimuli in RUE and RLE.       Lines/Drains/Airways       Drain  Duration                  Urethral Catheter 10/20/24 2210 Silicone 16 Fr. 11 days         Rectal Tube 10/25/24 2030 6 days         Gastrostomy/Enterostomy 10/30/24 1200 Gastrostomy-jejunostomy feeding 1 day              Airway  Duration             Adult Surgical Airway 08/19/24 0120 Shiley Extra Large Cuffed Distal 6.0/ 75mm 74 days              Peripheral Intravenous Line  Duration                  Midline Catheter - Single Lumen 10/20/24 1530 Right 11 days         Peripheral IV - Single Lumen 10/20/24 1600 18 G Anterior;Distal;Left Upper Arm 11 days                    Significant Labs:    Lab Results   Component Value Date    WBC 10.40 10/31/2024    HGB 8.8 (L) 10/31/2024    HCT 29.7 (L) 10/31/2024    MCV 87.6 10/31/2024     10/31/2024           BMP  Lab Results   Component Value Date     10/31/2024    K 3.7 10/31/2024    CO2 20 (L) 10/31/2024    BUN 6.6 (L) 10/31/2024    CREATININE 0.66 (L) 10/31/2024    CALCIUM 8.1 (L) 10/31/2024    AGAP 14.0 10/31/2024    EGFRNONAA 61 04/23/2022         ABG  No results for input(s): "PH", "PO2", "PCO2", "HCO3", "POCBASEDEF" in the last 168 hours.      Mechanical Ventilation Support:  Vent Mode: A/C (11/01/24 0538)  Ventilator Initiated: Yes (10/20/24 1546)  Set Rate: 20 BPM (11/01/24 0538)  Vt Set: 500 mL (11/01/24 0538)  PEEP/CPAP: 5 cmH20 (11/01/24 0538)  Oxygen Concentration (%): 30 (11/01/24 0538)  Peak Airway Pressure: 16 cmH20 (11/01/24 0538)  Plateau Pressure: 3 cmH20 (11/01/24 0338)  Total Ve: 9.3 L/m (11/01/24 0538)  F/VT Ratio<105 (RSBI): (!) 49.63 (11/01/24 0111)    Significant Imaging:  I have reviewed the pertinent imaging within the past 24 " hours.  No new imaging    Assessment/Plan:     Assessment  Sepsis  VRE Enterococcus and ESBL Klebsiella bacteremia  Klebsiella pneumoniae and Proteus mirabilis pneumonia  Afib with RVR  Chronic respiratory failure with tracheostomy ventilation at nursing home secondary to previous cerebrovascular accident.  Sacral wound  PEG tube feeding intolerance post jejunum extension    Plan  Admitted to ICU.  Continue with ICU care.  On chronic mechanical ventilation via trach   Digoxin and Lopressor have been held intermittently due to bouts of bradycardia  ID evaluated patient with recommendations to continue merrem (10/23-11/6) and linezolid (10/22-11/5)  Wound care and General surgery consulted; appreciate further recommendations we will need to re-evaluate after family has elected against palliation  Enteral tube feeds  Glucose replacement    DVT Prophylaxis: continue home Xarelto   GI Prophylaxis: PPI     32 minutes of critical care was time spent personally by me on the following activities: development of treatment plan with patient or surrogate and bedside caregivers, discussions with consultants, evaluation of patient's response to treatment, examination of patient, ordering and performing treatments and interventions, ordering and review of laboratory studies, ordering and review of radiographic studies, pulse oximetry, re-evaluation of patient's condition.  This critical care time did not overlap with that of any other provider or involve time for any procedures.     Eugene Patel MD  Pulmonary Critical Care Medicine  [facility]  DOS: 11/01/2024

## 2024-11-01 NOTE — PROGRESS NOTES
Inpatient Nutrition Assessment    Admit Date: 10/20/2024   Total duration of encounter: 12 days   Patient Age: 69 y.o.    Nutrition Recommendation/Prescription     Tube feeding recommendation:     Impact Peptide 1.5 goal rate 70 ml/hr to provide  2100 kcal/d  (111% est needs)  131 g protein/d (100% est needs)  1078 ml free water/d (52% est needs)  (calculations based on estimated 20 hr/d run time)     If no IV fluids running, can give 100ml q 2hr water flushes. Total water provided: 2078ml (100% est needs.)     Patel (provides 90 kcal, 2.5 g protein per serving) BID.    Communication of Recommendations: reviewed with nurse    Nutrition Assessment     Malnutrition Assessment/Nutrition-Focused Physical Exam    Malnutrition Context: chronic illness (10/21/24 1028)  Malnutrition Level: severe (10/21/24 1028)  Energy Intake (Malnutrition):  (unable to eval) (10/21/24 1028)  Weight Loss (Malnutrition): greater than 10% in 6 months (10/21/24 1028)  Subcutaneous Fat (Malnutrition): severe depletion (10/21/24 1028)  Orbital Region (Subcutaneous Fat Loss): severe depletion  Upper Arm Region (Subcutaneous Fat Loss): severe depletion     Muscle Mass (Malnutrition): severe depletion (10/21/24 1028)     Clavicle Bone Region (Muscle Loss): severe depletion  Clavicle and Acromion Bone Region (Muscle Loss): severe depletion  Scapular Bone Region (Muscle Loss): severe depletion              Fluid Accumulation (Malnutrition):  (does not meet criteria) (10/21/24 1028)        A minimum of two characteristics is recommended for diagnosis of either severe or non-severe malnutrition.    Chart Review    Reason Seen: continuous nutrition monitoring and physician consult for TF    Malnutrition Screening Tool Results   Have you recently lost weight without trying?: Unsure  Have you been eating poorly because of a decreased appetite?: No   MST Score: 2   Diagnosis:  Severe sepsis   UTI  Afib with RVR  Hypernatremia   Sacral wound    Relevant  Medical History:    Arthritis      Atrial fibrillation      BPH (benign prostatic hyperplasia)      Cardiac arrest      Coronary artery disease      Cyst, kidney, acquired      Diverticulosis      Hyperlipidemia      Hypertension      MI (myocardial infarction)      Obesity      Steatosis of liver      Stroke      Scheduled Medications:  atorvastatin, 10 mg, Daily  linezolid, 600 mg, Q12H  meropenem IV (PEDS and ADULTS), 2 g, Q8H  pantoprazole, 40 mg, Daily  QUEtiapine, 50 mg, BID  rivaroxaban, 20 mg, Nightly  scopolamine, 1 patch, Q3 Days    Continuous Infusions:  amiodarone in dextrose 5%, Last Rate: 1 mg/min (11/01/24 0933)  amiodarone in dextrose 5%      PRN Medications:  acetaminophen, 650 mg, Q6H PRN  dextromethorphan-guaiFENesin  mg/5 ml, 10 mL, Q4H PRN  dextrose 10%, 12.5 g, PRN  dextrose 10%, 25 g, PRN  glucagon (human recombinant), 1 mg, PRN  hydrALAZINE, 10 mg, Q4H PRN  metoclopramide HCl, 10 mg, Q8H PRN  naloxone, 0.02 mg, PRN  sodium chloride 0.9%, 10 mL, Q12H PRN    Calorie Containing IV Medications: no significant kcals from medications at this time    Recent Labs   Lab 10/26/24  0222 10/26/24  0642 10/26/24  1622 10/27/24  0348 10/28/24  0711 10/29/24  0335 10/30/24  0807 10/31/24  0318 10/31/24  0607   NA  --  147* 143 144 144 142 140  --  140   K  --  3.5 3.0* 3.5 3.2* 2.9* 3.5  --  3.7   CALCIUM  --  7.8* 8.0* 8.0* 7.7* 8.3* 8.2*  --  8.1*   MG  --  2.00  --  1.90 1.80  --   --   --   --    CL  --  112* 109* 108* 108* 106 108*  --  106   CO2  --  23 25 20* 25 22* 20*  --  20*   BUN  --  13.6 11.7 14.3 11.3 7.0* 5.8*  --  6.6*   CREATININE  --  0.57* 0.58* 0.59* 0.61* 0.58* 0.58*  --  0.66*   EGFRNORACEVR  --  >60 >60 >60 >60 >60 >60  --  >60   GLUCOSE  --  48* 105 35* 68* 61* 42*  --  49*   BILITOT  --  0.8 0.7 0.9 0.9 1.1 0.9  --  0.8   ALKPHOS  --  102 100 109 108 117 120  --  123   ALT  --  12 13 12 11 11 10  --  10   AST  --  18 16 19 19 19 18  --  17   ALBUMIN  --  2.1* 2.0* 2.2* 2.1*  "2.2* 2.3*  --  2.2*   WBC 14.39  14.39*  --   --  8.21 7.39 8.20 8.45 10.40  --    HGB 9.7*  --   --  9.2* 8.6* 9.4* 9.4* 8.8*  --    HCT 32.4*  --   --  30.4* 27.3* 30.5* 29.5* 29.7*  --      Nutrition Orders:  Diet NPO  Tube Feedings/Formulas 70; 1,400; Impact Peptide 1.5; Peg; 100; Every 2 hours,Tube Feedings/Formulas Other (see comments); Peg; Patel - Orange    Appetite/Oral Intake: not applicable/not applicable  Factors Affecting Nutritional Intake: on mechanical ventilation and tracheostomy  Social Needs Impacting Access to Food: none identified (from NH)  Food/Tenriism/Cultural Preferences: unable to obtain  Food Allergies: no known food allergies  Last Bowel Movement: 10/29/24  Wound(s):[REMOVED]      Altered Skin Integrity 02/26/24 1100 lower Buttocks Moisture associated dermatitis-Tissue loss description: Full thickness       Wound 08/19/24 1500 Pressure Injury Left anterior Leg-Tissue loss description: Partial thickness       Wound 08/05/24 1420 Pressure Injury Left Knee-Tissue loss description: Partial thickness       Wound 10/20/24 2100 Pressure Injury Sacral spine-Tissue loss description: Full thickness       Wound 08/19/24 1500 Pressure Injury Left lateral Ankle-Tissue loss description: Partial thickness     Comments    10/21/24: Pt with previous EMR wts indicating wt loss. On NH TF. Also with increased protein needs due to wounds.     10/25/24: TF on hold. Pt continues with vomiting. On Reglan.     10/29/24: TF still off. Plans for gtube extension into jejunum.     11/1/24: TF @ 55ml/hr, plans to advance per RN. Pt now with G-J tube placement. Tolerated so far per RN.     Anthropometrics    Height: 5' 9.02" (175.3 cm), Height Method: Estimated  Last Weight: 69.1 kg (152 lb 5.4 oz) (10/21/24 1026), Weight Method: Bed Scale  BMI (Calculated): 22.5  BMI Classification: normal (BMI 18.5-24.9)        Ideal Body Weight (IBW), Male: 160.12 lb     % Ideal Body Weight, Male (lb): 95.21 %               "   Usual Body Weight (UBW), k kg (per EMR from 24)  % Usual Body Weight: 76.94  % Weight Change From Usual Weight: -23.22 %  Usual Weight Provided By: EMR weight history    Wt Readings from Last 5 Encounters:   10/21/24 69.1 kg (152 lb 5.4 oz)   24 90.7 kg (199 lb 15.3 oz)   24 117.9 kg (260 lb)   24 117.9 kg (260 lb)   24 90.7 kg (200 lb)     Weight Change(s) Since Admission:   Wt Readings from Last 1 Encounters:   10/21/24 1026 69.1 kg (152 lb 5.4 oz)   10/21/24 0634 69.1 kg (152 lb 5.4 oz)   10/20/24 1535 63.5 kg (140 lb)   Admit Weight: 63.5 kg (140 lb) (10/20/24 1535), Weight Method: Estimated    Estimated Needs    Weight Used For Calorie Calculations: 69.1 kg (152 lb 5.4 oz)  Energy Calorie Requirements (kcal): 1886kcal (1.3 stress factor)  Energy Need Method: [place]-St Jeor  Weight Used For Protein Calculations: 69.1 kg (152 lb 5.4 oz)  Protein Requirements: 124-138gm (1.8-2g/kg)  Fluid Requirements (mL): 2073ml (30ml/kg)  CHO Requirement: 210gm (45% est kcal needs)     Enteral Nutrition     Formula: Impact Peptide 1.5 [place]  Rate/Volume: 70ml/hr  Water Flushes: 100ml q2hr  Additives/Modulars: Patel  Route: PEG/J  Method: continuous  Total Nutrition Provided by Tube Feeding, Additives, and Flushes:  Calories Provided  2100 kcal/d, 111% needs   Protein Provided  131 g/d, 100% needs   Fluid Provided  2078 ml/d, 100% needs   Continuous feeding calculations based on estimated 20 hr/d run time unless otherwise stated.    Parenteral Nutrition     Patient not receiving parenteral nutrition support at this time.    Evaluation of Received Nutrient Intake    Calories: meeting estimated needs  Protein: meeting estimated needs    Patient Education     Not applicable.    Nutrition Diagnosis     PES: Inadequate oral intake related to chronic illness as evidenced by trach/G-J tube. (active)     PES: Severe chronic disease or condition related malnutrition related to chronic illness as  evidenced by severe fat depletion, severe muscle depletion, and greater than 10% weight loss in 6 months. (active)    Nutrition Interventions     Intervention(s): collaboration with other providers    Goal: Meet greater than 80% of nutritional needs by follow-up. (goal progressing)  Goal: Tolerate enteral feeding at goal rate by follow-up. (goal progressing)    Nutrition Goals & Monitoring     Dietitian will monitor: energy intake and enteral nutrition intake  Discharge planning: too early to determine; pending clinical course  Nutrition Risk/Follow-Up: high (follow-up in 1-4 days)   Please consult if re-assessment needed sooner.

## 2024-11-02 LAB
POCT GLUCOSE: 109 MG/DL (ref 70–110)
POCT GLUCOSE: 115 MG/DL (ref 70–110)

## 2024-11-02 PROCEDURE — 94003 VENT MGMT INPAT SUBQ DAY: CPT

## 2024-11-02 PROCEDURE — 99222 1ST HOSP IP/OBS MODERATE 55: CPT | Mod: 25,GC,, | Performed by: SURGERY

## 2024-11-02 PROCEDURE — 27000207 HC ISOLATION

## 2024-11-02 PROCEDURE — 99900026 HC AIRWAY MAINTENANCE (STAT)

## 2024-11-02 PROCEDURE — 94760 N-INVAS EAR/PLS OXIMETRY 1: CPT

## 2024-11-02 PROCEDURE — 99900035 HC TECH TIME PER 15 MIN (STAT)

## 2024-11-02 PROCEDURE — 63600175 PHARM REV CODE 636 W HCPCS

## 2024-11-02 PROCEDURE — 25000003 PHARM REV CODE 250

## 2024-11-02 PROCEDURE — 25000003 PHARM REV CODE 250: Performed by: GENERAL PRACTICE

## 2024-11-02 PROCEDURE — 20000000 HC ICU ROOM

## 2024-11-02 PROCEDURE — 99900031 HC PATIENT EDUCATION (STAT)

## 2024-11-02 PROCEDURE — 63600175 PHARM REV CODE 636 W HCPCS: Performed by: INTERNAL MEDICINE

## 2024-11-02 PROCEDURE — 63600175 PHARM REV CODE 636 W HCPCS: Performed by: GENERAL PRACTICE

## 2024-11-02 PROCEDURE — 86318 IA INFECTIOUS AGENT ANTIBODY: CPT | Performed by: INTERNAL MEDICINE

## 2024-11-02 PROCEDURE — 0JB70ZZ EXCISION OF BACK SUBCUTANEOUS TISSUE AND FASCIA, OPEN APPROACH: ICD-10-PCS | Performed by: SURGERY

## 2024-11-02 PROCEDURE — 27100171 HC OXYGEN HIGH FLOW UP TO 24 HOURS

## 2024-11-02 PROCEDURE — 25000003 PHARM REV CODE 250: Performed by: NURSE PRACTITIONER

## 2024-11-02 RX ORDER — DIGOXIN 125 MCG
0.25 TABLET ORAL DAILY
Status: DISCONTINUED | OUTPATIENT
Start: 2024-11-02 | End: 2024-11-10

## 2024-11-02 RX ORDER — METOPROLOL TARTRATE 25 MG/1
12.5 TABLET ORAL 2 TIMES DAILY
Status: DISCONTINUED | OUTPATIENT
Start: 2024-11-02 | End: 2024-11-03

## 2024-11-02 RX ADMIN — DIGOXIN 0.25 MG: 125 TABLET ORAL at 02:11

## 2024-11-02 RX ADMIN — QUETIAPINE FUMARATE 50 MG: 25 TABLET ORAL at 09:11

## 2024-11-02 RX ADMIN — LINEZOLID 600 MG: 600 INJECTION, SOLUTION INTRAVENOUS at 08:11

## 2024-11-02 RX ADMIN — SODIUM CHLORIDE 2 G: 9 INJECTION, SOLUTION INTRAVENOUS at 10:11

## 2024-11-02 RX ADMIN — PANTOPRAZOLE SODIUM 40 MG: 40 INJECTION, POWDER, LYOPHILIZED, FOR SOLUTION INTRAVENOUS at 07:11

## 2024-11-02 RX ADMIN — RIVAROXABAN 20 MG: 10 TABLET, FILM COATED ORAL at 09:11

## 2024-11-02 RX ADMIN — GUAIFENESIN AND DEXTROMETHORPHAN 10 ML: 100; 10 SYRUP ORAL at 10:11

## 2024-11-02 RX ADMIN — QUETIAPINE FUMARATE 50 MG: 25 TABLET ORAL at 07:11

## 2024-11-02 RX ADMIN — SODIUM CHLORIDE 2 G: 9 INJECTION, SOLUTION INTRAVENOUS at 03:11

## 2024-11-02 RX ADMIN — METOPROLOL TARTRATE 12.5 MG: 25 TABLET, FILM COATED ORAL at 02:11

## 2024-11-02 RX ADMIN — SODIUM CHLORIDE 2 G: 9 INJECTION, SOLUTION INTRAVENOUS at 12:11

## 2024-11-02 RX ADMIN — ATORVASTATIN CALCIUM 10 MG: 10 TABLET, FILM COATED ORAL at 07:11

## 2024-11-02 RX ADMIN — LINEZOLID 600 MG: 600 INJECTION, SOLUTION INTRAVENOUS at 09:11

## 2024-11-02 RX ADMIN — GUAIFENESIN AND DEXTROMETHORPHAN 10 ML: 100; 10 SYRUP ORAL at 09:11

## 2024-11-02 RX ADMIN — METOPROLOL TARTRATE 12.5 MG: 25 TABLET, FILM COATED ORAL at 09:11

## 2024-11-02 RX ADMIN — AMIODARONE HYDROCHLORIDE 0.5 MG/MIN: 1.8 INJECTION, SOLUTION INTRAVENOUS at 04:11

## 2024-11-02 RX ADMIN — AMIODARONE HYDROCHLORIDE 0.5 MG/MIN: 1.8 INJECTION, SOLUTION INTRAVENOUS at 03:11

## 2024-11-02 NOTE — PROGRESS NOTES
[facility] - [facility]    Cardiology  Progress Note    Patient Name: Delio Daniel Jr.  MRN: [id number]  Admission Date: 10/20/2024  Hospital Length of Stay: 13 days  Code Status: Full Code   Attending Provider: Itz Francisco MD   Consulting Provider: Brodie Donovan MD  Primary Care Physician: Jl Briones MD  Principal Problem:UTI (urinary tract infection)    Patient information was obtained from past medical records, ER records, and primary team.     Subjective:   Chief Complaint/Reason for Consult: AF    HPI:   Mr. Daniel is a 70 y/o male with a history of AFIB on Xarelto, CVA, SSS, HTN, HLD, CAD, who is known to [facility], Dr. Kimbrough. He presented to the ER on 8.3.24 from [facility] with decreased responsiveness, severe sepsis, and recurrent UTI. 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 Patient nonverbal at baseline. In the ED, patient was febrile, tachycardic with -190s, tachypneic, /60. EKG showed Afib with RVR. Diltiazem drip started in ED for acute HR Control. Patient required mechanical ventilation and admitted to the ICU. Patient treated for sepsis related to UTI. [facility] consulted for AF Management.     Hospital Course:  10.22.24: Resting in bed. AF CVR HR 70's. On Amiodarone Infusion. BP Controlled. Trach/Vented, FIO2 40%.  11.1.24: NAD Noted. BP Low Normal. Beta blockade held a few days back due to marginal BP. On Amiodarone Infusion for acute HR Control. AF RVR. Re consulted for AF Management.   11.2.24: In A-fib with HR in 80s this am.      PMH: Arthritis, AF, BPH, Cardiac Arrest, CAD, Renal Cyst, Diverticulosis, Hyperlipidemia, Hypertension, MI, Obesity, Lever Steatosis, Stroke  PSH: Pacemaker, LHC, Colonoscopy, EGD, Colon Excision, Tracheostomy  Family History: Mother - HTN; Sister - HTN; Brother - HTN   Social History: Tobacco- Negative, Alcohol- Negative, Substance Abuse- Negative    Previous  Cardiac Diagnostics:   Echocardiogram (10.22.24):  Left Ventricle: The left ventricle is normal in size. Mildly increased wall thickness. There is normal systolic function with a visually estimated ejection fraction of 65%. Grade I diastolic dysfunction.  Right Ventricle: Normal right ventricular cavity size. Systolic function is normal.  Aortic Valve: There is mild aortic valve sclerosis.  Mitral Valve: There is mild (1+) regurgitation.  Tricuspid Valve: There is mild (1+) regurgitation.  The estimated pulmonary artery systolic pressure is 21 mmHg.  AICD/pacemaker lead noted.  No evidence of vegetation noted.  No evidence of valvular endocarditis noted.  If clinical suspicion is high would recommend transesophageal echocardiogram.    Echocardiogram (5.3.24):  EF 65%  RV with Normal RV Function.  MR- Mild. TR- Mild.   Pericardial Effusion- None. Anterior Fat Pad Noted.     ECHO (1.15.24):  TDS. No Definity contrast available for use. Left Ventricle: The left ventricle is normal in size. Moderately increased wall thickness. Normal wall motion. There is normal systolic function. Ejection fraction by visual approximation is 55%. Right Ventricle: Normal right ventricular cavity size. Systolic function is borderline low. Left Atrium: Left atrium is severely dilated. Right Atrium: Right atrium is mildly dilated. Mitral Valve: There is bileaflet sclerosis. There is mild regurgitation. IVC/SVC: Normal venous pressure at 3 mmHg.     Venous US LUE (12.26.23):  There was no evidence of deep vein thrombosis in the left upper extremity.   A superficial thrombosis was identified in the left cephalic vein.      Carotid US (12.19.23):  The right internal carotid artery demonstrated less than 50% stenosis.  The left internal carotid artery demonstrated less than 50% stenosis.  The bilateral vertebral arteries were patent with antegrade flow.  Bilateral internal carotid arteries demonstrated decreased velocities starting from the  common carotid arteries.      [facility] (5.25.18):   LM: Normal. Normal size and bifurcation. LAD: Abnormal. Large, mild atherosclerotic plaque, moderately large diagonals. Mid LAD 30% stenosis. The lesion was tubular and eccentric. LCX: abnormal and Large, mild atherosclerotic plaque, large OM with mild narrowing.OM 1 35% stenosis. RCA: normal. Large and no significant disease.        Review of patient's allergies indicates:  No Known Allergies  No current facility-administered medications on file prior to encounter.     Current Outpatient Medications on File Prior to Encounter   Medication Sig    ascorbic Acid (VITAMIN C) 500 mg CpSR 500 mg 2 (two) times daily. Per PEG tube    busPIRone (BUSPAR) 5 MG Tab 5 mg by Per G Tube route 3 (three) times daily.    cholestyramine (QUESTRAN) 4 gram packet 4 g once daily. Via PEG tube    cholestyramine-aspartame (QUESTRAN LIGHT) 4 gram PwPk 1 packet (4 g total) by Per G Tube route 2 (two) times daily.    diltiaZEM (CARDIZEM) 60 MG tablet 60 mg by Per G Tube route every 6 (six) hours.    ferrous sulfate 300 mg (60 mg iron)/5 mL syrup Take 5 mLs (300 mg total) by mouth once daily.    finasteride (PROSCAR) 5 mg tablet Take 1 tablet (5 mg total) by mouth once daily.    furosemide (LASIX) 80 MG tablet 80 mg by Per G Tube route as needed (for Edema).    L. acidophilus/L.bulgaricus (FLORANEX ORAL) 1 packet by PEG Tube route Daily.    levetiracetam 500 mg/5 mL (5 mL) Soln 1,500 mg by Per G Tube route 2 (two) times daily. Nursing home reports medication hold by MD until level redraw on Monday.    LIPITOR 10 mg tablet 10 mg by Per G Tube route once daily.    metoclopramide HCl (REGLAN) 5 mg/5 mL Soln 10 mg by Per G Tube route 3 (three) times daily before meals.    metoprolol tartrate (LOPRESSOR) 100 MG tablet 100 mg by Per G Tube route 2 (two) times daily.    miconazole NITRATE 2 % (MICOTIN) 2 % top powder Apply topically 2 (two) times daily.    multivitamin (THERAGRAN) per tablet 1 tablet by  Per G Tube route once daily.    pantoprazole (PROTONIX) 40 mg injection Inject 40 mg into the vein 2 (two) times daily.    polyethylene glycol (GLYCOLAX) 17 gram PwPk Take 17 g by mouth 2 (two) times daily as needed for Constipation.    protein supplement (PROMOD PROTEIN ORAL) 30 mLs by Per G Tube route once daily.    QUEtiapine (SEROQUEL) 25 MG Tab 25 mg by Per G Tube route 2 (two) times daily.    scopolamine (TRANSDERM-SCOP) 1.3-1.5 mg (1 mg over 3 days) Place 1 patch onto the skin Every 3 (three) days.    sucralfate (CARAFATE) 1 gram tablet 1 tablet (1 g total) by Per G Tube route 4 (four) times daily before meals and nightly.    tamsulosin (FLOMAX) 0.4 mg Cap Take 1 capsule (0.4 mg total) by mouth every evening.    venlafaxine 75 mg TR24 1 tablet by Per G Tube route 2 (two) times a day.    XARELTO 20 mg Tab 1 tablet by Per G Tube route nightly.     Review of Systems   Unable to perform ROS: Patient nonverbal     Objective:     Vital Signs (Most Recent):  Temp: 99.7 °F (37.6 °C) (11/02/24 0800)  Pulse: 110 (11/02/24 0805)  Resp: (!) 21 (11/02/24 0805)  BP: (!) 84/57 (11/02/24 0805)  SpO2: 100 % (11/02/24 0805) Vital Signs (24h Range):  Temp:  [98.5 °F (36.9 °C)-99.7 °F (37.6 °C)] 99.7 °F (37.6 °C)  Pulse:  [] 110  Resp:  [1-27] 21  SpO2:  [82 %-100 %] 100 %  BP: ()/(57-80) 84/57   Weight: 69.1 kg (152 lb 5.4 oz)  Body mass index is 22.49 kg/m².  SpO2: 100 %       Intake/Output Summary (Last 24 hours) at 11/2/2024 1145  Last data filed at 11/2/2024 1000  Gross per 24 hour   Intake 3277.8 ml   Output 3060 ml   Net 217.8 ml     Lines/Drains/Airways       Drain  Duration                  Urethral Catheter 10/20/24 2210 Silicone 16 Fr. 12 days         Rectal Tube 10/25/24 2030 7 days         Gastrostomy/Enterostomy 10/30/24 1200 Gastrostomy-jejunostomy feeding 2 days              Airway  Duration             Adult Surgical Airway 08/19/24 0120 Wendi Extra Large Cuffed Distal 6.0/ 75mm 75 days               "Peripheral Intravenous Line  Duration                  Midline Catheter - Single Lumen 10/20/24 1530 Right 12 days         Peripheral IV - Single Lumen 10/20/24 1600 18 G Anterior;Distal;Left Upper Arm 12 days                  Significant Labs:   Chemistries:   Recent Labs   Lab 10/27/24  0348 10/28/24  0711 10/29/24  0335 10/30/24  0807 10/31/24  0607    144 142 140 140   K 3.5 3.2* 2.9* 3.5 3.7   * 108* 106 108* 106   CO2 20* 25 22* 20* 20*   BUN 14.3 11.3 7.0* 5.8* 6.6*   CREATININE 0.59* 0.61* 0.58* 0.58* 0.66*   CALCIUM 8.0* 7.7* 8.3* 8.2* 8.1*   BILITOT 0.9 0.9 1.1 0.9 0.8   ALKPHOS 109 108 117 120 123   ALT 12 11 11 10 10   AST 19 19 19 18 17   GLUCOSE 35* 68* 61* 42* 49*   MG 1.90 1.80  --   --   --         CBC/Anemia Labs: Coags:    Recent Labs   Lab 10/29/24  0335 10/30/24  0807 10/31/24  0318   WBC 8.20 8.45 10.40   HGB 9.4* 9.4* 8.8*   HCT 30.5* 29.5* 29.7*    250 236   MCV 82.7 82.6 87.6   RDW 16.4 16.8 17.1*    No results for input(s): "PT", "INR", "APTT" in the last 168 hours.     Significant Imaging:  Imaging Results              X-Ray Chest AP Portable (Final result)  Result time 10/20/24 19:36:31      Final result by Sami Taylor MD (10/20/24 19:36:31)                   Impression:      No acute cardiopulmonary process identified.      Electronically signed by: Sami Taylor  Date:    10/20/2024  Time:    19:36               Narrative:    EXAMINATION:  XR CHEST AP PORTABLE    CLINICAL HISTORY:  Sepsis, unspecified organism    TECHNIQUE:  One view    COMPARISON:  August 21, 2020.    FINDINGS:  Cardiopericardial silhouette appearance is similar.  Tracheostomy cannula is in similar location.  Right chest implanted cardiac device electrodes terminate within the right atrium and right ventricle.  No acute dense focal or segmental consolidation, congestive process, pleural effusions or pneumothorax.                                      Telemetry:  AF CVR    Physical Exam  Vitals and " nursing note reviewed.   Constitutional:       General: He is not in acute distress.     Appearance: He is ill-appearing.   Neck:      Comments: Tracheostomy  Cardiovascular:      Rate and Rhythm: Normal rate. Rhythm irregular.   Pulmonary:      Effort: Pulmonary effort is normal. No respiratory distress.      Comments: Mechanical Ventilation- Vent associated breath sounds.   Abdominal:      Palpations: Abdomen is soft.   Musculoskeletal:      Right lower leg: No edema.      Left lower leg: No edema.      Comments: Legs are Warm.   Skin:     General: Skin is warm and dry.   Neurological:      Mental Status: Mental status is at baseline.       Home Medications:   No current facility-administered medications on file prior to encounter.     Current Outpatient Medications on File Prior to Encounter   Medication Sig Dispense Refill    ascorbic Acid (VITAMIN C) 500 mg CpSR 500 mg 2 (two) times daily. Per PEG tube      busPIRone (BUSPAR) 5 MG Tab 5 mg by Per G Tube route 3 (three) times daily.      cholestyramine (QUESTRAN) 4 gram packet 4 g once daily. Via PEG tube      cholestyramine-aspartame (QUESTRAN LIGHT) 4 gram PwPk 1 packet (4 g total) by Per G Tube route 2 (two) times daily. 180 packet 3    diltiaZEM (CARDIZEM) 60 MG tablet 60 mg by Per G Tube route every 6 (six) hours.      ferrous sulfate 300 mg (60 mg iron)/5 mL syrup Take 5 mLs (300 mg total) by mouth once daily. 450 mL 0    finasteride (PROSCAR) 5 mg tablet Take 1 tablet (5 mg total) by mouth once daily. 30 tablet 11    furosemide (LASIX) 80 MG tablet 80 mg by Per G Tube route as needed (for Edema).      L. acidophilus/L.bulgaricus (FLORANEX ORAL) 1 packet by PEG Tube route Daily.      levetiracetam 500 mg/5 mL (5 mL) Soln 1,500 mg by Per G Tube route 2 (two) times daily. Nursing home reports medication hold by MD until level redraw on Monday.      LIPITOR 10 mg tablet 10 mg by Per G Tube route once daily.      metoclopramide HCl (REGLAN) 5 mg/5 mL Soln 10  mg by Per G Tube route 3 (three) times daily before meals.      metoprolol tartrate (LOPRESSOR) 100 MG tablet 100 mg by Per G Tube route 2 (two) times daily.      miconazole NITRATE 2 % (MICOTIN) 2 % top powder Apply topically 2 (two) times daily.      multivitamin (THERAGRAN) per tablet 1 tablet by Per G Tube route once daily.      pantoprazole (PROTONIX) 40 mg injection Inject 40 mg into the vein 2 (two) times daily.      polyethylene glycol (GLYCOLAX) 17 gram PwPk Take 17 g by mouth 2 (two) times daily as needed for Constipation.      protein supplement (PROMOD PROTEIN ORAL) 30 mLs by Per G Tube route once daily.      QUEtiapine (SEROQUEL) 25 MG Tab 25 mg by Per G Tube route 2 (two) times daily.      scopolamine (TRANSDERM-SCOP) 1.3-1.5 mg (1 mg over 3 days) Place 1 patch onto the skin Every 3 (three) days.      sucralfate (CARAFATE) 1 gram tablet 1 tablet (1 g total) by Per G Tube route 4 (four) times daily before meals and nightly.      tamsulosin (FLOMAX) 0.4 mg Cap Take 1 capsule (0.4 mg total) by mouth every evening. 30 capsule 11    venlafaxine 75 mg TR24 1 tablet by Per G Tube route 2 (two) times a day.      XARELTO 20 mg Tab 1 tablet by Per G Tube route nightly.       Current Schedule Inpatient Medications:   atorvastatin  10 mg Per G Tube Daily    linezolid  600 mg Intravenous Q12H    meropenem IV (PEDS and ADULTS)  2 g Intravenous Q8H    pantoprazole  40 mg Intravenous Daily    QUEtiapine  50 mg Per G Tube BID    rivaroxaban  20 mg Per G Tube Nightly     Continuous Infusions:   amiodarone in dextrose 5%  0.5 mg/min Intravenous Continuous 16.7 mL/hr at 11/02/24 0306 0.5 mg/min at 11/02/24 0306       Assessment:   Persistent AF - Currently AF RVR    - [id number] Score 4 (4.8% Stroke Risk per Year)    - on Xarelto for Stroke Risk Reduction    - Amio was started to help rate in the setting of hypotension    - BP improved and will add back some BB, along with daily Dig, can maybe get off Amio later  CAD     - [facility] (5.25.18): LM: Normal. Normal size and bifurcation. LAD: Abnormal. Large, mild atherosclerotic plaque, moderately large diagonals. Mid LAD 30% stenosis. The lesion was tubular and eccentric. LCX: abnormal and Large, mild atherosclerotic plaque, large OM with mild narrowing.OM 1 35% stenosis. RCA: normal. Large and no significant disease.      - EF 65%  NSTEMI Type II due to Sepsis/UTI   SSS    - Dual Chamber ICD  History of Hypertension with Transient Episodes of Hypotension (Beta Blockade on hold)  Hyperlipidemia  Sepsis/ E. Faecium VRE & ESBL Klebsiella Bacteremia     - Leukocytosis    - Chest XR: No Acute Process    - Sacral Wound  UTI  Anemia   History of Hemorrhagic CVA (12/2023)    - Residual Left Sided Deficits    - S/P Peg Tube and Trach   Small Left Pleural Effusion   Arthritis  Dysphagia    - s/p PEG   BRIAN (Resolved)  History of Seizure Disorder  Liver Steatosis  Diverticulosis  BPH  Depression  Obesity   No known history of GI Bleed     Plan:   On Xarelto for stroke risk reduction  Metoprolol 12.5 mg BID, hold for sys BP under 90  Digoxin 0.25 mg oral daily  Cont Amio for now      Brodie Donovan MD  Cardiology  [facility] - [facility] ICU  11/02/2024

## 2024-11-02 NOTE — CONSULTS
Acute Care Surgery   Consult Note    Patient Name: Delio Daniel Jr.  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 Currently on broad spectrum antibiotics with ID following. Cardiology following persistent afib, recently taken off amiodarone gtt, continues on Digoxin and metoprolol. Palliative consult recently placed to discuss goals of care. Surgery consulted for sacral decub ulcer and consideration for diverting ostomy, currently worsening due to mobility limitations. Surgery signed off 2/2 declining health status of patients co morbidities and pending palliative discussion. Patient's family declined hospice care since evaluation and surgery has been asked to re-evaluate for any need for sacral decubitus ulcer debridement prior to transfer from ICU to facility.      Review of Systems:  12 point ROS negative except as stated in HPI    PAST HISTORY:   Past medical history:  Past Medical History:   Diagnosis Date    Arthritis     Atrial fibrillation     BPH (benign prostatic hyperplasia)     Cardiac arrest     Coronary artery disease     Cyst, kidney, acquired     Diverticulosis     Hyperlipidemia     Hypertension     MI (myocardial infarction)     Obesity     Steatosis of liver     Stroke        Past surgical history:  Past Surgical History:    Procedure Laterality Date    A-V CARDIAC PACEMAKER INSERTION Right     CARDIAC CATHETERIZATION      COLONOSCOPY W/ BIOPSIES      CRANIECTOMY Right 12/20/2023    Procedure: CRANIECTOMY;  Surgeon: Artem Can MD;  Location: [facility];  Service: Neurosurgery;  Laterality: Right;    ESOPHAGOGASTRODUODENOSCOPY W/ PEG N/A 1/2/2024    Procedure: PEG;  Surgeon: Tani Day MD;  Location: [facility] ENDOSCOPY;  Service: Gastroenterology;  Laterality: N/A;    ESOPHAGOGASTRODUODENOSCOPY W/ PEG N/A 10/30/2024    Procedure: EGD w/ Jtube placement;  Surgeon: Gabriele Salcido MD;  Location: [facility] ENDOSCOPY;  Service: Endoscopy;  Laterality: N/A;  with J tube extension    excision of colon      TRACHEOSTOMY N/A 12/29/2023    Procedure: CREATION, TRACHEOSTOMY;  Surgeon: Patricia Winslow MD;  Location: [facility];  Service: ENT;  Laterality: N/A;  REQ 1130 //  NEEDS 2 SCRUBS       Family history:  Family History   Problem Relation Name Age of Onset    Hypertension Mother      Hypertension Father      Hypertension Sister         Social history:  Social History     Socioeconomic History    Marital status:     Number of children: 9   Occupational History    Occupation: retired   Tobacco Use    Smoking status: Never    Smokeless tobacco: Never   Substance and Sexual Activity    Alcohol use: Not Currently    Drug use: Not Currently    Sexual activity: Not Currently     Partners: Female     Social Drivers of Health     Financial Resource Strain: Patient Unable To Answer (10/21/2024)    Overall Financial Resource Strain (CARDIA)     Difficulty of Paying Living Expenses: Patient unable to answer   Food Insecurity: Patient Unable To Answer (10/21/2024)    Hunger Vital Sign     Worried About Running Out of Food in the Last Year: Patient unable to answer     Ran Out of Food in the Last Year: Patient unable to answer   Transportation Needs: Patient Unable To Answer (10/21/2024)    TRANSPORTATION NEEDS     Transportation :  Patient unable to answer   Physical Activity: Sufficiently Active (8/5/2024)    Exercise Vital Sign     Days of Exercise per Week: 5 days     Minutes of Exercise per Session: 30 min   Stress: Patient Unable To Answer (10/21/2024)    [employer] - Occupational Stress Questionnaire     Feeling of Stress : Patient unable to answer   Housing Stability: Patient Unable To Answer (10/21/2024)    Housing Stability Vital Sign     Unable to Pay for Housing in the Last Year: Patient unable to answer     Homeless in the Last Year: Patient unable to answer     Social History     Tobacco Use   Smoking Status Never   Smokeless Tobacco Never      Social History     Substance and Sexual Activity   Alcohol Use Not Currently        MEDICATIONS & ALLERGIES:     No current facility-administered medications on file prior to encounter.     Current Outpatient Medications on File Prior to Encounter   Medication Sig    ascorbic Acid (VITAMIN C) 500 mg CpSR 500 mg 2 (two) times daily. Per PEG tube    busPIRone (BUSPAR) 5 MG Tab 5 mg by Per G Tube route 3 (three) times daily.    cholestyramine (QUESTRAN) 4 gram packet 4 g once daily. Via PEG tube    cholestyramine-aspartame (QUESTRAN LIGHT) 4 gram PwPk 1 packet (4 g total) by Per G Tube route 2 (two) times daily.    diltiaZEM (CARDIZEM) 60 MG tablet 60 mg by Per G Tube route every 6 (six) hours.    ferrous sulfate 300 mg (60 mg iron)/5 mL syrup Take 5 mLs (300 mg total) by mouth once daily.    finasteride (PROSCAR) 5 mg tablet Take 1 tablet (5 mg total) by mouth once daily.    furosemide (LASIX) 80 MG tablet 80 mg by Per G Tube route as needed (for Edema).    L. acidophilus/L.bulgaricus (FLORANEX ORAL) 1 packet by PEG Tube route Daily.    levetiracetam 500 mg/5 mL (5 mL) Soln 1,500 mg by Per G Tube route 2 (two) times daily. Nursing home reports medication hold by MD until level redraw on Monday.    LIPITOR 10 mg tablet 10 mg by Per G Tube route once daily.     metoclopramide HCl (REGLAN) 5 mg/5 mL Soln 10 mg by Per G Tube route 3 (three) times daily before meals.    metoprolol tartrate (LOPRESSOR) 100 MG tablet 100 mg by Per G Tube route 2 (two) times daily.    miconazole NITRATE 2 % (MICOTIN) 2 % top powder Apply topically 2 (two) times daily.    multivitamin (THERAGRAN) per tablet 1 tablet by Per G Tube route once daily.    pantoprazole (PROTONIX) 40 mg injection Inject 40 mg into the vein 2 (two) times daily.    polyethylene glycol (GLYCOLAX) 17 gram PwPk Take 17 g by mouth 2 (two) times daily as needed for Constipation.    protein supplement (PROMOD PROTEIN ORAL) 30 mLs by Per G Tube route once daily.    QUEtiapine (SEROQUEL) 25 MG Tab 25 mg by Per G Tube route 2 (two) times daily.    scopolamine (TRANSDERM-SCOP) 1.3-1.5 mg (1 mg over 3 days) Place 1 patch onto the skin Every 3 (three) days.    sucralfate (CARAFATE) 1 gram tablet 1 tablet (1 g total) by Per G Tube route 4 (four) times daily before meals and nightly.    tamsulosin (FLOMAX) 0.4 mg Cap Take 1 capsule (0.4 mg total) by mouth every evening.    venlafaxine 75 mg TR24 1 tablet by Per G Tube route 2 (two) times a day.    XARELTO 20 mg Tab 1 tablet by Per G Tube route nightly.       Allergies: Review of patient's allergies indicates:  No Known Allergies    Scheduled Meds:   atorvastatin  10 mg Per G Tube Daily    linezolid  600 mg Intravenous Q12H    meropenem IV (PEDS and ADULTS)  2 g Intravenous Q8H    pantoprazole  40 mg Intravenous Daily    QUEtiapine  50 mg Per G Tube BID    rivaroxaban  20 mg Per G Tube Nightly       Continuous Infusions:   amiodarone in dextrose 5%  0.5 mg/min Intravenous Continuous 16.7 mL/hr at 11/02/24 0306 0.5 mg/min at 11/02/24 0306       PRN Meds:  Current Facility-Administered Medications:     acetaminophen, 650 mg, Per G Tube, Q6H PRN    dextromethorphan-guaiFENesin  mg/5 ml, 10 mL, Per G Tube, Q4H PRN    dextrose 10%, 12.5 g, Intravenous, PRN    dextrose 10%, 25 g,  "Intravenous, PRN    glucagon (human recombinant), 1 mg, Intramuscular, PRN    hydrALAZINE, 10 mg, Intravenous, Q4H PRN    metoclopramide HCl, 10 mg, Oral, Q8H PRN    naloxone, 0.02 mg, Intravenous, PRN    sodium chloride 0.9%, 10 mL, Intravenous, Q12H PRN    OBJECTIVE:   Vital Signs:  VITAL SIGNS: 24 HR MIN & MAX LAST   Temp  Min: 98.5 °F (36.9 °C)  Max: 99.7 °F (37.6 °C)  99.7 °F (37.6 °C)   BP  Min: 84/57  Max: 120/77  (!) 84/57    Pulse  Min: 75  Max: 110  110    Resp  Min: 1  Max: 27  (!) 21    SpO2  Min: 82 %  Max: 100 %  100 %      HT: 5' 9.02" (175.3 cm)  WT: 69.1 kg (152 lb 5.4 oz)  BMI: 22.5     Intake/output:  Intake/Output - Last 3 Shifts         10/31 0700  11/01 0659 11/01 0700  11/02 0659 11/02 0700  11/03 0659    I.V. (mL/kg)  346.4 (5)     NG/GT 1470 1963     IV Piggyback 1105.1 968.4     Total Intake(mL/kg) 2575.1 (37.3) 3277.8 (47.4)     Urine (mL/kg/hr) 1700 (1) 1245 (0.8) 500 (1.6)    Stool 650 1500     Total Output 2350 2745 500    Net +225.1 +532.8 -500           Stool Occurrence 1 x 1 x             Intake/Output Summary (Last 24 hours) at 11/2/2024 1124  Last data filed at 11/2/2024 1000  Gross per 24 hour   Intake 3277.8 ml   Output 3060 ml   Net 217.8 ml         Physical Exam:  General: ill appearing no acute distress  HEENT: Normocephalic  CV: RR  Resp: Trach in place   GI:  Abdomen soft, non-tender, non-distended, PEG in place  :  Deferred  MSK: contraction to bilateral lower extremities, minimal movement of RUE  Skin/wounds: decubitus ulcer with minimal necrotic tissue, no obviously exposed bone or purulent drainage, packing in place  Neuro:  opens eyes spontaneously     Labs:  Troponin:  No results for input(s): "TROPONINI" in the last 72 hours.    CBC:  Recent Labs     10/31/24  0318   WBC 10.40   RBC 3.39*   HGB 8.8*   HCT 29.7*      MCV 87.6   MCH 26.0*   MCHC 29.6*     CMP:  Recent Labs     10/31/24  0607   CALCIUM 8.1*   ALBUMIN 2.2*      K 3.7   CO2 20*    " "  BUN 6.6*   CREATININE 0.66*   ALKPHOS 123   ALT 10   AST 17   BILITOT 0.8     Lactic Acid:  No results for input(s): "LACTATE" in the last 72 hours.    ETOH:  No results for input(s): "ETHANOL" in the last 72 hours.   Urine Drug Screen:  No results for input(s): "COCAINE", "OPIATE", "BARBITURATE", "AMPHETAMINE", "FENTANYL", "CANNABINOIDS", "MDMA" in the last 72 hours.    Invalid input(s): "BENZODIAZEPINE", "PHENCYCLIDINE"   ABG:  No results for input(s): "PH", "PO2", "PCO2", "HCO3", "BE" in the last 168 hours.       Diagnostic Results:  X-Ray Abdomen AP 1 View   Final Result      X-Ray Chest 1 View   Final Result      No acute findings.         Electronically signed by: Tani Calderon   Date:    10/25/2024   Time:    07:34      CT Abdomen Pelvis With IV Contrast NO Oral Contrast   Final Result      Interval development of sacral decubitus just to the right of the superior aspect of the vertebral fold.  No abscess or fluid collection is seen      Findings seen consistent with urinary bladder wall thickening and inflammatory changes consistent with cystitis      Interval development of bilateral patchy areas of pneumonia with developing consolidation in the lower lobes         Electronically signed by: Erick Grant   Date:    10/21/2024   Time:    17:22      X-Ray Chest AP Portable   Final Result      No acute cardiopulmonary process identified.         Electronically signed by: Sami Taylor   Date:    10/20/2024   Time:    19:36          ASSESSMENT & PLAN:    Delio Daniel Jr. is a 69 y.o. 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 Surgery consulted for sacral decub ulcer re-evaluation.    - Debridement of sacral Stage III Decubitus ulcer to be done bedside today 11/01  - Recommend wound vac placement per wound care on Monday 11/04   - Recommend continued wound care as previously preformed until W[place] placement   - Recommend nutritional optimization for best wound healing outcomes  - Recommend turn patient every 2 hours to offload pressure areas  - Antibiotics and rest of care per primary ICU team and consultants     Please call with any additional questions or concerns.     Brianna Enrique MD   [facility] General Surgery

## 2024-11-02 NOTE — PROCEDURES
"Delio Daniel Jr. is a 69 y.o. male patient.    Temp: 98.9 °F (37.2 °C) (11/02/24 1200)  Pulse: 110 (11/02/24 0805)  Resp: (!) 21 (11/02/24 0805)  BP: (!) 84/57 (11/02/24 0805)  SpO2: 100 % (11/02/24 0805)  Weight: 69.1 kg (152 lb 5.4 oz) (10/21/24 1026)  Height: 5' 9.02" (175.3 cm) (10/21/24 1023)       Debridement    Date/Time: 11/2/2024 12:51 PM    Performed by: Brianna Enrique MD  Authorized by: Brianna Enrique MD    Associated wounds:        Wound 10/20/24 2100 Pressure Injury Sacral spine  Local anesthesia used?: No      Wound Details:    Location:  Sacrum    Type of Debridement:  Excisional       Length (cm):  10       Area (sq cm):  120       Width (cm):  12       Percent Debrided (%):  5       Depth (cm):  5       Total Area Debrided (sq cm):  6    Depth of debridement:  Muscle/fascia/tendon    Tissue debrided:  Subcutaneous, Adipose and Other    Devitalized tissue debrided:  Exudate and Necrotic/Eschar    Instruments:  Blade and Scissors  Bleeding:  Minimal  Hemostasis Achieved: Yes  Method Used:  Pressure  Patient tolerance:  Patient tolerated the procedure well with no immediate complications    Sharp excision of inferior right lateral necrotic tissue and removal of fibrinous exudate at base of ulcer.     Brianna Enrique MD   [facility] General Surgery   11/2/2024    "

## 2024-11-02 NOTE — PROGRESS NOTES
[facility] - Emergency Dept  Pulmonary Critical Care Note    Patient Name: Delio Daniel Jr.  MRN: [id number]  Admission Date: 10/20/2024  Hospital Length of Stay: 13 days  Code Status: Full Code  Attending Provider: Itz Francisco MD  Primary Care Provider: Jl Briones MD     Subjective:     HPI:   Patient is a 68 y/o male with extensive PMH who presented from NH on 10/20/24 with decreased responsiveness, severe sepsis, and recurrent UTI. 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     Patient transferred to ED from [facility] with lethargy and tachycardia. Family at bedside reports patient is nonverbal at baseline but typically more alert. In the ED, patient is febrile, tachycardic with -190s, tachypneic, /60. EKG shows Afib with RVR. Diltiazem drip started in ED. Labs are significant for WBC of 16.5, lactic acid of 3.3, Cr 1.48, BUN 45.3, Na 155, K+ 5.1, troponin elevated at 0.118. UA with evidence of UTI. Of note, pt was being treated outpatient for a UTI, currently on day 4 of 7 day Rocephin course. Urine culture 10/16/24 with multidrug resistant Klebsiella pneumonia and Proteus mirabilis with susceptibility to Cefepime. Patient received 3L of NS and initiated on Vanc and Cefepime in ED. Admitted to the ICU on mechanical ventilation via trach.    Hospital Course/Significant events:  10/20/24: Admitted to ICU for severe sepsis     24 Hour Interval History:  No acute events overnight.  Underwent PEG tube extension on 10/30 by Gastroenterology.  Currently advancing as tolerated.  Sugars have normalized.  Continuing on amio and digoxin.  Had sacral ulcer debris that was  debrided today with General surgery.    Past Medical History:   Diagnosis Date    Arthritis     Atrial fibrillation     BPH (benign prostatic hyperplasia)     Cardiac arrest     Coronary artery disease     Cyst, kidney, acquired     Diverticulosis     Hyperlipidemia     Hypertension     MI (myocardial infarction)     Obesity     Steatosis of liver     Stroke        Past Surgical History:   Procedure Laterality Date    A-V CARDIAC PACEMAKER INSERTION Right     CARDIAC CATHETERIZATION      COLONOSCOPY W/ BIOPSIES      CRANIECTOMY Right 12/20/2023    Procedure: CRANIECTOMY;  Surgeon: Artem Can MD;  Location: [facility];  Service: Neurosurgery;  Laterality: Right;    ESOPHAGOGASTRODUODENOSCOPY W/ PEG N/A 1/2/2024    Procedure: PEG;  Surgeon: Tani Day MD;  Location: [facility] ENDOSCOPY;  Service: Gastroenterology;  Laterality: N/A;    ESOPHAGOGASTRODUODENOSCOPY W/ PEG N/A 10/30/2024    Procedure: EGD w/ Jtube placement;  Surgeon: Gabriele Salcido MD;  Location: [facility] ENDOSCOPY;  Service: Endoscopy;  Laterality: N/A;  with J tube extension    excision of colon      TRACHEOSTOMY N/A 12/29/2023    Procedure: CREATION, TRACHEOSTOMY;  Surgeon: Patricia Winslow MD;  Location: [facility];  Service: ENT;  Laterality: N/A;  REQ 1130 //  NEEDS 2 SCRUBS       Social History     Socioeconomic History    Marital status:     Number of children: 9   Occupational History    Occupation: retired   Tobacco Use    Smoking status: Never    Smokeless tobacco: Never   Substance and Sexual Activity    Alcohol use: Not Currently    Drug use: Not Currently    Sexual activity: Not Currently     Partners: Female     Social Drivers of Health     Financial Resource Strain: Patient Unable To Answer (10/21/2024)    Overall Financial Resource Strain (CARDIA)     Difficulty of Paying Living Expenses: Patient unable to answer   Food Insecurity: Patient Unable To Answer (10/21/2024)    Hunger Vital Sign     Worried About Running Out  of Food in the Last Year: Patient unable to answer     Ran Out of Food in the Last Year: Patient unable to answer   Transportation Needs: Patient Unable To Answer (10/21/2024)    TRANSPORTATION NEEDS     Transportation : Patient unable to answer   Physical Activity: Sufficiently Active (8/5/2024)    Exercise Vital Sign     Days of Exercise per Week: 5 days     Minutes of Exercise per Session: 30 min   Stress: Patient Unable To Answer (10/21/2024)    [employer] [place] [employer] - Occupational Stress Questionnaire     Feeling of Stress : Patient unable to answer   Housing Stability: Patient Unable To Answer (10/21/2024)    Housing Stability Vital Sign     Unable to Pay for Housing in the Last Year: Patient unable to answer     Homeless in the Last Year: Patient unable to answer       Current Outpatient Medications   Medication Instructions    ascorbic Acid (VITAMIN C) 500 mg, 2 times daily, Per PEG tube    busPIRone (BUSPAR) 5 mg, Per G Tube, 3 times daily    cholestyramine (QUESTRAN) 4 gram packet 4 g, Daily, Via PEG tube    cholestyramine-aspartame (QUESTRAN LIGHT) 4 gram PwPk 4 g, Per G Tube, 2 times daily    diltiaZEM (CARDIZEM) 60 mg, Per G Tube, Every 6 hours    ferrous sulfate 300 mg, Oral, Daily    finasteride (PROSCAR) 5 mg, Oral, Daily    furosemide (LASIX) 80 mg, Per G Tube, As needed (PRN)    L. acidophilus/L.bulgaricus (FLORANEX ORAL) 1 packet, PEG Tube, Daily    levetiracetam 1,500 mg, Per G Tube, 2 times daily, Nursing home reports medication hold by MD until level redraw on Monday.    LIPITOR 10 mg, Per G Tube, Daily    metoclopramide HCl (REGLAN) 10 mg, Per G Tube, 3 times daily before meals    metoprolol tartrate (LOPRESSOR) 100 mg, Per G Tube, 2 times daily    miconazole NITRATE 2 % (MICOTIN) 2 % top powder Topical (Top), 2 times daily    multivitamin (THERAGRAN) per tablet 1 tablet, Per G Tube, Daily    pantoprazole (PROTONIX) 40 mg, Intravenous, 2 times daily    polyethylene glycol  (GLYCOLAX) 17 g, Oral, 2 times daily PRN    protein supplement (PROMOD PROTEIN ORAL) 30 mLs, Per G Tube, Daily    QUEtiapine (SEROQUEL) 25 mg, Per G Tube, 2 times daily    scopolamine (TRANSDERM-SCOP) 1.3-1.5 mg (1 mg over 3 days) 1 patch, Transdermal, Every 3 days    sucralfate (CARAFATE) 1 g, Per G Tube, Before meals & nightly    tamsulosin (FLOMAX) 0.4 mg, Oral, Nightly    venlafaxine 75 mg TR24 1 tablet, Per G Tube, 2 times daily    XARELTO 20 mg Tab 1 tablet, Per G Tube, Nightly       Current Inpatient Medications   atorvastatin  10 mg Per G Tube Daily    linezolid  600 mg Intravenous Q12H    meropenem IV (PEDS and ADULTS)  2 g Intravenous Q8H    pantoprazole  40 mg Intravenous Daily    QUEtiapine  50 mg Per G Tube BID    rivaroxaban  20 mg Per G Tube Nightly       Current Intravenous Infusions   amiodarone in dextrose 5%  0.5 mg/min Intravenous Continuous 16.7 mL/hr at 11/02/24 0306 0.5 mg/min at 11/02/24 0306     Review of Systems   Unable to perform ROS: Mental status change        Objective:       Intake/Output Summary (Last 24 hours) at 11/2/2024 1316  Last data filed at 11/2/2024 1200  Gross per 24 hour   Intake 3277.8 ml   Output 3435 ml   Net -157.2 ml         Vital Signs (Most Recent):  Temp: 98.9 °F (37.2 °C) (11/02/24 1200)  Pulse: 110 (11/02/24 0805)  Resp: (!) 21 (11/02/24 0805)  BP: 103/70 (11/02/24 1245)  SpO2: 100 % (11/02/24 0805)  Body mass index is 22.49 kg/m².  Weight: 69.1 kg (152 lb 5.4 oz) Vital Signs (24h Range):  Temp:  [98.6 °F (37 °C)-99.7 °F (37.6 °C)] 98.9 °F (37.2 °C)  Pulse:  [] 110  Resp:  [1-27] 21  SpO2:  [82 %-100 %] 100 %  BP: ()/(57-80) 103/70     Physical Exam  Constitutional:       General: He is not in acute distress.     Appearance: He is ill-appearing.      Comments: Thin, Chronically ill-appearing   HENT:      Mouth/Throat:      Mouth: Mucous membranes are dry.   Cardiovascular:      Rate and Rhythm: Tachycardia present. Rhythm irregular.   Abdominal:       "General: There is no distension.      Palpations: Abdomen is soft.      Comments: PEG tube in place    Musculoskeletal:      Comments: LUE and LLE in contracture    Skin:     General: Skin is warm and dry.      Comments: Large sacral pressure ulcer. BLE with scattered superficial abrasions.    Neurological:      Comments: Non-sedated. Nonverbal at baseline.  Does not follow commands. Winces and withdraws to painful stimuli in RUE and RLE.       Lines/Drains/Airways       Drain  Duration                  Urethral Catheter 10/20/24 2210 Silicone 16 Fr. 12 days         Rectal Tube 10/25/24 2030 7 days         Gastrostomy/Enterostomy 10/30/24 1200 Gastrostomy-jejunostomy feeding 3 days              Airway  Duration             Adult Surgical Airway 08/19/24 0120 Shiley Extra Large Cuffed Distal 6.0/ 75mm 75 days              Peripheral Intravenous Line  Duration                  Midline Catheter - Single Lumen 10/20/24 1530 Right 12 days         Peripheral IV - Single Lumen 10/20/24 1600 18 G Anterior;Distal;Left Upper Arm 12 days                    Significant Labs:    Lab Results   Component Value Date    WBC 10.40 10/31/2024    HGB 8.8 (L) 10/31/2024    HCT 29.7 (L) 10/31/2024    MCV 87.6 10/31/2024     10/31/2024           BMP  Lab Results   Component Value Date     10/31/2024    K 3.7 10/31/2024    CO2 20 (L) 10/31/2024    BUN 6.6 (L) 10/31/2024    CREATININE 0.66 (L) 10/31/2024    CALCIUM 8.1 (L) 10/31/2024    AGAP 14.0 10/31/2024    EGFRNONAA 61 04/23/2022         ABG  No results for input(s): "PH", "PO2", "PCO2", "HCO3", "POCBASEDEF" in the last 168 hours.      Mechanical Ventilation Support:  Vent Mode: VOLUME A/C (11/02/24 1105)  Ventilator Initiated: Yes (10/20/24 1546)  Set Rate: 20 BPM (11/02/24 1105)  Vt Set: 500 mL (11/02/24 1105)  PEEP/CPAP: 5 cmH20 (11/02/24 1105)  Oxygen Concentration (%): 30 (11/02/24 1200)  Peak Airway Pressure: 28 cmH20 (11/02/24 1105)  Plateau Pressure: 3 cmH20 " (11/01/24 0338)  Total Ve: 6.6 L/m (11/02/24 0649)  F/VT Ratio<105 (RSBI): (!) 69.08 (11/02/24 0805)    Significant Imaging:  I have reviewed the pertinent imaging within the past 24 hours.  No new imaging    Assessment/Plan:     Assessment  Sepsis  VRE Enterococcus and ESBL Klebsiella bacteremia  Klebsiella pneumoniae and Proteus mirabilis pneumonia  Afib with RVR  Chronic respiratory failure with tracheostomy ventilation at nursing home secondary to previous cerebrovascular accident.  Sacral wound  PEG tube feeding intolerance post jejunum extension    Plan  Admitted to ICU.  Continue with ICU care.  On chronic mechanical ventilation via trach   Digoxin and Lopressor restarted per cards  ID evaluated patient with recommendations to continue merrem (10/23-11/6) and linezolid (10/22-11/5)  Wound care and General surgery consulted; debrided sacral ulcer today and will likely need wound vac 11/4  Enteral tube feeds    DVT Prophylaxis: continue home Xarelto   GI Prophylaxis: PPI     32 minutes of critical care was time spent personally by me on the following activities: development of treatment plan with patient or surrogate and bedside caregivers, discussions with consultants, evaluation of patient's response to treatment, examination of patient, ordering and performing treatments and interventions, ordering and review of laboratory studies, ordering and review of radiographic studies, pulse oximetry, re-evaluation of patient's condition.  This critical care time did not overlap with that of any other provider or involve time for any procedures.     Kelle Cardenas MD  Pulmonary Critical Care Medicine  [facility]  DOS: 11/02/2024

## 2024-11-02 NOTE — PLAN OF CARE
Problem: Skin Injury Risk Increased  Goal: Skin Health and Integrity  Outcome: Progressing     Problem: Adult Inpatient Plan of Care  Goal: Plan of Care Review  Outcome: Progressing  Flowsheets (Taken 11/2/2024 1509)  Plan of Care Reviewed With:   patient   child  Goal: Patient-Specific Goal (Individualized)  Outcome: Progressing  Flowsheets (Taken 11/2/2024 1502)  Anxieties, Fears or Concerns: unable to assess  Goal: Absence of Hospital-Acquired Illness or Injury  Outcome: Progressing  Goal: Optimal Comfort and Wellbeing  Outcome: Progressing  Goal: Readiness for Transition of Care  Outcome: Progressing     Problem: Infection  Goal: Absence of Infection Signs and Symptoms  Outcome: Progressing     Problem: Sepsis/Septic Shock  Goal: Optimal Coping  Outcome: Progressing  Goal: Absence of Bleeding  Outcome: Progressing  Goal: Blood Glucose Level Within Targeted Range  Outcome: Progressing  Goal: Absence of Infection Signs and Symptoms  Outcome: Progressing  Goal: Optimal Nutrition Intake  Outcome: Progressing     Problem: Pneumonia  Goal: Fluid Balance  Outcome: Progressing  Goal: Resolution of Infection Signs and Symptoms  Outcome: Progressing  Goal: Effective Oxygenation and Ventilation  Outcome: Progressing     Problem: Wound  Goal: Optimal Coping  Outcome: Progressing  Goal: Optimal Functional Ability  Outcome: Progressing  Goal: Absence of Infection Signs and Symptoms  Outcome: Progressing  Goal: Improved Oral Intake  Outcome: Progressing  Goal: Optimal Pain Control and Function  Outcome: Progressing  Goal: Skin Health and Integrity  Outcome: Progressing  Goal: Optimal Wound Healing  Outcome: Progressing

## 2024-11-03 LAB
ALBUMIN SERPL-MCNC: 2 G/DL (ref 3.4–4.8)
ALBUMIN/GLOB SERPL: 0.6 RATIO (ref 1.1–2)
ALP SERPL-CCNC: 106 UNIT/L (ref 40–150)
ALT SERPL-CCNC: 9 UNIT/L (ref 0–55)
ANION GAP SERPL CALC-SCNC: 7 MEQ/L
AST SERPL-CCNC: 12 UNIT/L (ref 5–34)
BASOPHILS # BLD AUTO: 0.05 X10(3)/MCL
BASOPHILS NFR BLD AUTO: 0.5 %
BILIRUB SERPL-MCNC: 0.5 MG/DL
BUN SERPL-MCNC: 18.2 MG/DL (ref 8.4–25.7)
CALCIUM SERPL-MCNC: 7.7 MG/DL (ref 8.8–10)
CHLORIDE SERPL-SCNC: 108 MMOL/L (ref 98–107)
CO2 SERPL-SCNC: 28 MMOL/L (ref 23–31)
CREAT SERPL-MCNC: 0.56 MG/DL (ref 0.72–1.25)
CREAT/UREA NIT SERPL: 33
EOSINOPHIL # BLD AUTO: 0.1 X10(3)/MCL (ref 0–0.9)
EOSINOPHIL NFR BLD AUTO: 1.1 %
ERYTHROCYTE [DISTWIDTH] IN BLOOD BY AUTOMATED COUNT: 18.1 % (ref 11.5–17)
GFR SERPLBLD CREATININE-BSD FMLA CKD-EPI: >60 ML/MIN/1.73/M2
GLOBULIN SER-MCNC: 3.5 GM/DL (ref 2.4–3.5)
GLUCOSE SERPL-MCNC: 98 MG/DL (ref 82–115)
HCT VFR BLD AUTO: 24.3 % (ref 42–52)
HGB BLD-MCNC: 7.7 G/DL (ref 14–18)
IMM GRANULOCYTES # BLD AUTO: 0.04 X10(3)/MCL (ref 0–0.04)
IMM GRANULOCYTES NFR BLD AUTO: 0.4 %
LYMPHOCYTES # BLD AUTO: 2.16 X10(3)/MCL (ref 0.6–4.6)
LYMPHOCYTES NFR BLD AUTO: 23.3 %
MCH RBC QN AUTO: 26.4 PG (ref 27–31)
MCHC RBC AUTO-ENTMCNC: 31.7 G/DL (ref 33–36)
MCV RBC AUTO: 83.2 FL (ref 80–94)
MONOCYTES # BLD AUTO: 0.88 X10(3)/MCL (ref 0.1–1.3)
MONOCYTES NFR BLD AUTO: 9.5 %
NEUTROPHILS # BLD AUTO: 6.06 X10(3)/MCL (ref 2.1–9.2)
NEUTROPHILS NFR BLD AUTO: 65.2 %
NRBC BLD AUTO-RTO: 0 %
PLATELET # BLD AUTO: 243 X10(3)/MCL (ref 130–400)
PMV BLD AUTO: 10.4 FL (ref 7.4–10.4)
POCT GLUCOSE: 103 MG/DL (ref 70–110)
POCT GLUCOSE: 111 MG/DL (ref 70–110)
POCT GLUCOSE: 113 MG/DL (ref 70–110)
POCT GLUCOSE: 115 MG/DL (ref 70–110)
POTASSIUM SERPL-SCNC: 2.8 MMOL/L (ref 3.5–5.1)
PROT SERPL-MCNC: 5.5 GM/DL (ref 5.8–7.6)
RBC # BLD AUTO: 2.92 X10(6)/MCL (ref 4.7–6.1)
SODIUM SERPL-SCNC: 143 MMOL/L (ref 136–145)
WBC # BLD AUTO: 9.29 X10(3)/MCL (ref 4.5–11.5)

## 2024-11-03 PROCEDURE — 20000000 HC ICU ROOM

## 2024-11-03 PROCEDURE — 25000003 PHARM REV CODE 250

## 2024-11-03 PROCEDURE — 63600175 PHARM REV CODE 636 W HCPCS: Performed by: GENERAL PRACTICE

## 2024-11-03 PROCEDURE — 99900026 HC AIRWAY MAINTENANCE (STAT)

## 2024-11-03 PROCEDURE — 36415 COLL VENOUS BLD VENIPUNCTURE: CPT

## 2024-11-03 PROCEDURE — 63600175 PHARM REV CODE 636 W HCPCS

## 2024-11-03 PROCEDURE — 63600175 PHARM REV CODE 636 W HCPCS: Performed by: INTERNAL MEDICINE

## 2024-11-03 PROCEDURE — 27000207 HC ISOLATION

## 2024-11-03 PROCEDURE — 27100171 HC OXYGEN HIGH FLOW UP TO 24 HOURS

## 2024-11-03 PROCEDURE — 85025 COMPLETE CBC W/AUTO DIFF WBC: CPT

## 2024-11-03 PROCEDURE — 94760 N-INVAS EAR/PLS OXIMETRY 1: CPT

## 2024-11-03 PROCEDURE — 25000003 PHARM REV CODE 250: Performed by: NURSE PRACTITIONER

## 2024-11-03 PROCEDURE — 25000003 PHARM REV CODE 250: Performed by: GENERAL PRACTICE

## 2024-11-03 PROCEDURE — 94003 VENT MGMT INPAT SUBQ DAY: CPT

## 2024-11-03 PROCEDURE — 99900031 HC PATIENT EDUCATION (STAT)

## 2024-11-03 PROCEDURE — 80053 COMPREHEN METABOLIC PANEL: CPT

## 2024-11-03 PROCEDURE — 99900035 HC TECH TIME PER 15 MIN (STAT)

## 2024-11-03 RX ORDER — SODIUM CHLORIDE, SODIUM LACTATE, POTASSIUM CHLORIDE, CALCIUM CHLORIDE 600; 310; 30; 20 MG/100ML; MG/100ML; MG/100ML; MG/100ML
INJECTION, SOLUTION INTRAVENOUS CONTINUOUS
Status: ACTIVE | OUTPATIENT
Start: 2024-11-03 | End: 2024-11-04

## 2024-11-03 RX ORDER — POTASSIUM CHLORIDE 14.9 MG/ML
20 INJECTION INTRAVENOUS
Status: COMPLETED | OUTPATIENT
Start: 2024-11-03 | End: 2024-11-03

## 2024-11-03 RX ORDER — METOPROLOL TARTRATE 25 MG/1
25 TABLET, FILM COATED ORAL 2 TIMES DAILY
Status: DISCONTINUED | OUTPATIENT
Start: 2024-11-03 | End: 2024-11-18 | Stop reason: HOSPADM

## 2024-11-03 RX ORDER — POTASSIUM CHLORIDE 20 MEQ/1
40 TABLET, EXTENDED RELEASE ORAL ONCE
Status: COMPLETED | OUTPATIENT
Start: 2024-11-03 | End: 2024-11-03

## 2024-11-03 RX ORDER — POTASSIUM CHLORIDE 7.45 MG/ML
40 INJECTION INTRAVENOUS ONCE
Status: DISCONTINUED | OUTPATIENT
Start: 2024-11-03 | End: 2024-11-03

## 2024-11-03 RX ADMIN — SODIUM CHLORIDE, POTASSIUM CHLORIDE, SODIUM LACTATE AND CALCIUM CHLORIDE 1000 ML: 600; 310; 30; 20 INJECTION, SOLUTION INTRAVENOUS at 01:11

## 2024-11-03 RX ADMIN — POTASSIUM CHLORIDE 20 MEQ: 14.9 INJECTION INTRAVENOUS at 08:11

## 2024-11-03 RX ADMIN — METOPROLOL TARTRATE 12.5 MG: 25 TABLET, FILM COATED ORAL at 08:11

## 2024-11-03 RX ADMIN — PANTOPRAZOLE SODIUM 40 MG: 40 INJECTION, POWDER, LYOPHILIZED, FOR SOLUTION INTRAVENOUS at 08:11

## 2024-11-03 RX ADMIN — LINEZOLID 600 MG: 600 INJECTION, SOLUTION INTRAVENOUS at 09:11

## 2024-11-03 RX ADMIN — METOPROLOL TARTRATE 25 MG: 25 TABLET, FILM COATED ORAL at 08:11

## 2024-11-03 RX ADMIN — POTASSIUM CHLORIDE 40 MEQ: 1500 TABLET, EXTENDED RELEASE ORAL at 07:11

## 2024-11-03 RX ADMIN — SODIUM CHLORIDE, POTASSIUM CHLORIDE, SODIUM LACTATE AND CALCIUM CHLORIDE: 600; 310; 30; 20 INJECTION, SOLUTION INTRAVENOUS at 02:11

## 2024-11-03 RX ADMIN — RIVAROXABAN 20 MG: 10 TABLET, FILM COATED ORAL at 08:11

## 2024-11-03 RX ADMIN — POTASSIUM CHLORIDE 20 MEQ: 14.9 INJECTION INTRAVENOUS at 07:11

## 2024-11-03 RX ADMIN — LINEZOLID 600 MG: 600 INJECTION, SOLUTION INTRAVENOUS at 08:11

## 2024-11-03 RX ADMIN — AMIODARONE HYDROCHLORIDE 0.5 MG/MIN: 1.8 INJECTION, SOLUTION INTRAVENOUS at 04:11

## 2024-11-03 RX ADMIN — ATORVASTATIN CALCIUM 10 MG: 10 TABLET, FILM COATED ORAL at 08:11

## 2024-11-03 RX ADMIN — SODIUM CHLORIDE 2 G: 9 INJECTION, SOLUTION INTRAVENOUS at 05:11

## 2024-11-03 RX ADMIN — DIGOXIN 0.25 MG: 125 TABLET ORAL at 08:11

## 2024-11-03 RX ADMIN — SODIUM CHLORIDE 2 G: 9 INJECTION, SOLUTION INTRAVENOUS at 09:11

## 2024-11-03 RX ADMIN — QUETIAPINE FUMARATE 50 MG: 25 TABLET ORAL at 08:11

## 2024-11-03 RX ADMIN — AMIODARONE HYDROCHLORIDE 0.5 MG/MIN: 1.8 INJECTION, SOLUTION INTRAVENOUS at 01:11

## 2024-11-03 RX ADMIN — GUAIFENESIN AND DEXTROMETHORPHAN 10 ML: 100; 10 SYRUP ORAL at 04:11

## 2024-11-03 NOTE — PROGRESS NOTES
[facility] - Emergency Dept  Pulmonary Critical Care Note    Patient Name: Delio Daniel Jr.  MRN: [id number]  Admission Date: 10/20/2024  Hospital Length of Stay: 14 days  Code Status: Full Code  Attending Provider: Itz Francisco MD  Primary Care Provider: Jl Briones MD     Subjective:     HPI:   Patient is a 70 y/o male with extensive PMH who presented from NH on 10/20/24 with decreased responsiveness, severe sepsis, and recurrent UTI. 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     Patient transferred to ED from [facility] with lethargy and tachycardia. Family at bedside reports patient is nonverbal at baseline but typically more alert. In the ED, patient is febrile, tachycardic with -190s, tachypneic, /60. EKG shows Afib with RVR. Diltiazem drip started in ED. Labs are significant for WBC of 16.5, lactic acid of 3.3, Cr 1.48, BUN 45.3, Na 155, K+ 5.1, troponin elevated at 0.118. UA with evidence of UTI. Of note, pt was being treated outpatient for a UTI, currently on day 4 of 7 day Rocephin course. Urine culture 10/16/24 with multidrug resistant Klebsiella pneumonia and Proteus mirabilis with susceptibility to Cefepime. Patient received 3L of NS and initiated on Vanc and Cefepime in ED. Admitted to the ICU on mechanical ventilation via trach.    Hospital Course/Significant events:  10/20/24: Admitted to ICU for severe sepsis   10/30/2024: PEG tube extension      24 Hour Interval History:  Patient overnight developed worsening hypotension. Remaining vitals stable. Telemetry showing rate controlled irregular irregular rhythm consistent with rate controlled Afib. Considering  hypotensive multifactorial in the setting multiple medications that can cause hypotension plus patient likely volume down in the setting of c difficile infection. Given 1L LR bolus and initiated maintenance LR @ 100 cc/hr. Will initiate vasopressors if patient not responsive to fluid resuscitation. Patient otherwise continues to open eyes only to name call and only able to move RUE and RLE in responsive to painful stimuli. Continues on metoprolol, digoxin, and amiodarone for Afib. Continues on IV merropenem for VRE enterococcus and ESBL klebsiella bacteremia.    Past Medical History:   Diagnosis Date    Arthritis     Atrial fibrillation     BPH (benign prostatic hyperplasia)     Cardiac arrest     Coronary artery disease     Cyst, kidney, acquired     Diverticulosis     Hyperlipidemia     Hypertension     MI (myocardial infarction)     Obesity     Steatosis of liver     Stroke        Past Surgical History:   Procedure Laterality Date    A-V CARDIAC PACEMAKER INSERTION Right     CARDIAC CATHETERIZATION      COLONOSCOPY W/ BIOPSIES      CRANIECTOMY Right 12/20/2023    Procedure: CRANIECTOMY;  Surgeon: Artem Can MD;  Location: [facility];  Service: Neurosurgery;  Laterality: Right;    ESOPHAGOGASTRODUODENOSCOPY W/ PEG N/A 1/2/2024    Procedure: PEG;  Surgeon: Tani Day MD;  Location: [facility] ENDOSCOPY;  Service: Gastroenterology;  Laterality: N/A;    ESOPHAGOGASTRODUODENOSCOPY W/ PEG N/A 10/30/2024    Procedure: EGD w/ Jtube placement;  Surgeon: Gabriele Salcido MD;  Location: [facility] ENDOSCOPY;  Service: Endoscopy;  Laterality: N/A;  with J tube extension    excision of colon      TRACHEOSTOMY N/A 12/29/2023    Procedure: CREATION, TRACHEOSTOMY;  Surgeon: Patricia Winslow MD;  Location: [facility];  Service: ENT;  Laterality: N/A;  REQ 1130 //  NEEDS 2 SCRUBS       Social History     Socioeconomic History    Marital status:     Number of children: 9   Occupational History    Occupation: retired    Tobacco Use    Smoking status: Never    Smokeless tobacco: Never   Substance and Sexual Activity    Alcohol use: Not Currently    Drug use: Not Currently    Sexual activity: Not Currently     Partners: Female     Social Drivers of Health     Financial Resource Strain: Patient Unable To Answer (10/21/2024)    Overall Financial Resource Strain (CARDIA)     Difficulty of Paying Living Expenses: Patient unable to answer   Food Insecurity: Patient Unable To Answer (10/21/2024)    Hunger Vital Sign     Worried About Running Out of Food in the Last Year: Patient unable to answer     Ran Out of Food in the Last Year: Patient unable to answer   Transportation Needs: Patient Unable To Answer (10/21/2024)    TRANSPORTATION NEEDS     Transportation : Patient unable to answer   Physical Activity: Sufficiently Active (8/5/2024)    Exercise Vital Sign     Days of Exercise per Week: 5 days     Minutes of Exercise per Session: 30 min   Stress: Patient Unable To Answer (10/21/2024)    [employer] - Occupational Stress Questionnaire     Feeling of Stress : Patient unable to answer   Housing Stability: Patient Unable To Answer (10/21/2024)    Housing Stability Vital Sign     Unable to Pay for Housing in the Last Year: Patient unable to answer     Homeless in the Last Year: Patient unable to answer       Current Outpatient Medications   Medication Instructions    ascorbic Acid (VITAMIN C) 500 mg, 2 times daily, Per PEG tube    busPIRone (BUSPAR) 5 mg, Per G Tube, 3 times daily    cholestyramine (QUESTRAN) 4 gram packet 4 g, Daily, Via PEG tube    cholestyramine-aspartame (QUESTRAN LIGHT) 4 gram PwPk 4 g, Per G Tube, 2 times daily    diltiaZEM (CARDIZEM) 60 mg, Per G Tube, Every 6 hours    ferrous sulfate 300 mg, Oral, Daily    finasteride (PROSCAR) 5 mg, Oral, Daily    furosemide (LASIX) 80 mg, Per G Tube, As needed (PRN)    L. acidophilus/L.bulgaricus (FLORANEX ORAL) 1 packet, PEG Tube, Daily     levetiracetam 1,500 mg, Per G Tube, 2 times daily, Nursing home reports medication hold by MD until level redraw on Monday.    LIPITOR 10 mg, Per G Tube, Daily    metoclopramide HCl (REGLAN) 10 mg, Per G Tube, 3 times daily before meals    metoprolol tartrate (LOPRESSOR) 100 mg, Per G Tube, 2 times daily    miconazole NITRATE 2 % (MICOTIN) 2 % top powder Topical (Top), 2 times daily    multivitamin (THERAGRAN) per tablet 1 tablet, Per G Tube, Daily    pantoprazole (PROTONIX) 40 mg, Intravenous, 2 times daily    polyethylene glycol (GLYCOLAX) 17 g, Oral, 2 times daily PRN    protein supplement (PROMOD PROTEIN ORAL) 30 mLs, Per G Tube, Daily    QUEtiapine (SEROQUEL) 25 mg, Per G Tube, 2 times daily    scopolamine (TRANSDERM-SCOP) 1.3-1.5 mg (1 mg over 3 days) 1 patch, Transdermal, Every 3 days    sucralfate (CARAFATE) 1 g, Per G Tube, Before meals & nightly    tamsulosin (FLOMAX) 0.4 mg, Oral, Nightly    venlafaxine 75 mg TR24 1 tablet, Per G Tube, 2 times daily    XARELTO 20 mg Tab 1 tablet, Per G Tube, Nightly       Current Inpatient Medications   atorvastatin  10 mg Per G Tube Daily    digoxin  0.25 mg Per G Tube Daily    lactated ringers  1,000 mL Intravenous Once    linezolid  600 mg Intravenous Q12H    meropenem IV (PEDS and ADULTS)  2 g Intravenous Q8H    metoprolol tartrate  12.5 mg Per G Tube BID    pantoprazole  40 mg Intravenous Daily    QUEtiapine  50 mg Per G Tube BID    rivaroxaban  20 mg Per G Tube Nightly       Current Intravenous Infusions   amiodarone in dextrose 5%  0.5 mg/min Intravenous Continuous 16.7 mL/hr at 11/03/24 0125 0.5 mg/min at 11/03/24 0125    lactated ringers   Intravenous Continuous         Review of Systems   Unable to perform ROS: Mental status change        Objective:       Intake/Output Summary (Last 24 hours) at 11/3/2024 0143  Last data filed at 11/3/2024 0100  Gross per 24 hour   Intake 4397.41 ml   Output 4705 ml   Net -307.59 ml         Vital Signs (Most Recent):  Temp: 98.3  °F (36.8 °C) (11/03/24 0000)  Pulse: 80 (11/03/24 0100)  Resp: 20 (11/03/24 0100)  BP: 94/63 (11/03/24 0100)  SpO2: 99 % (11/03/24 0100)  Body mass index is 22.49 kg/m².  Weight: 69.1 kg (152 lb 5.4 oz) Vital Signs (24h Range):  Temp:  [98.3 °F (36.8 °C)-99.7 °F (37.6 °C)] 98.3 °F (36.8 °C)  Pulse:  [] 80  Resp:  [0-25] 20  SpO2:  [91 %-100 %] 99 %  BP: ()/(57-80) 94/63     Physical Exam  Constitutional:       General: He is not in acute distress.     Appearance: He is ill-appearing.      Comments: Thin, Chronically ill-appearing   HENT:      Mouth/Throat:      Mouth: Mucous membranes are dry.   Cardiovascular:      Rate and Rhythm: Tachycardia present. Rhythm irregular.   Abdominal:      General: There is no distension.      Palpations: Abdomen is soft.      Comments: PEG tube in place    Musculoskeletal:      Comments: LUE and LLE in contracture    Skin:     General: Skin is warm and dry.      Comments: Large sacral pressure ulcer. BLE with scattered superficial abrasions.    Neurological:      Comments: Non-sedated. Nonverbal at baseline.  Does not follow commands. Winces and withdraws to painful stimuli in RUE and RLE.       Lines/Drains/Airways       Drain  Duration                  Urethral Catheter 10/20/24 2210 Silicone 16 Fr. 13 days         Rectal Tube 10/25/24 2030 8 days         Gastrostomy/Enterostomy 10/30/24 1200 Gastrostomy-jejunostomy feeding 3 days              Airway  Duration             Adult Surgical Airway 08/19/24 0120 Shiley Extra Large Cuffed Distal 6.0/ 75mm 76 days              Peripheral Intravenous Line  Duration                  Midline Catheter - Single Lumen 10/20/24 1530 Right 13 days         Peripheral IV - Single Lumen 10/20/24 1600 18 G Anterior;Distal;Left Upper Arm 13 days                    Significant Labs:    Lab Results   Component Value Date    WBC 10.40 10/31/2024    HGB 8.8 (L) 10/31/2024    HCT 29.7 (L) 10/31/2024    MCV 87.6 10/31/2024     10/31/2024  "          BMP  Lab Results   Component Value Date     10/31/2024    K 3.7 10/31/2024    CO2 20 (L) 10/31/2024    BUN 6.6 (L) 10/31/2024    CREATININE 0.66 (L) 10/31/2024    CALCIUM 8.1 (L) 10/31/2024    AGAP 14.0 10/31/2024    EGFRNONAA 61 04/23/2022         ABG  No results for input(s): "PH", "PO2", "PCO2", "HCO3", "POCBASEDEF" in the last 168 hours.      Mechanical Ventilation Support:  Vent Mode: A/C (11/03/24 0057)  Ventilator Initiated: Yes (10/20/24 1546)  Set Rate: 20 BPM (11/03/24 0057)  Vt Set: 500 mL (11/03/24 0057)  PEEP/CPAP: 5 cmH20 (11/03/24 0057)  Oxygen Concentration (%): 30 (11/03/24 0000)  Peak Airway Pressure: 26 cmH20 (11/03/24 0057)  Plateau Pressure: 3 cmH20 (11/01/24 0338)  Total Ve: 8.1 L/m (11/03/24 0057)  F/VT Ratio<105 (RSBI): (!) 41.67 (11/03/24 0057)    Significant Imaging:  I have reviewed the pertinent imaging within the past 24 hours.  No new imaging    Assessment/Plan:     Assessment  Sepsis  VRE Enterococcus and ESBL Klebsiella bacteremia  Klebsiella pneumoniae and Proteus mirabilis pneumonia  Afib with RVR  Chronic respiratory failure with tracheostomy ventilation at nursing home secondary to previous cerebrovascular accident.  Sacral wound  PEG tube feeding intolerance post jejunum extension    Plan  Continue ICU care  Continue chronic mechanical ventilation via trach   Consulted cardiology for afib; continues on Digoxin and Lopressor per cards  ID evaluated patient with recommendations to continue merrem (10/23-11/6) and linezolid (10/22-11/5)  Wound care and General surgery consulted; debrided sacral ulcer (11/02/2024); will likely need wound vac 11/4  Continue enteral tube feeds    DVT Prophylaxis: home Xarelto   GI Prophylaxis: PPI     32 minutes of critical care was time spent personally by me on the following activities: development of treatment plan with patient or surrogate and bedside caregivers, discussions with consultants, evaluation of patient's response to " treatment, examination of patient, ordering and performing treatments and interventions, ordering and review of laboratory studies, ordering and review of radiographic studies, pulse oximetry, re-evaluation of patient's condition.  This critical care time did not overlap with that of any other provider or involve time for any procedures.     Kelle Cardenas MD  Pulmonary Critical Care Medicine  [facility]  DOS: 11/03/2024

## 2024-11-03 NOTE — PLAN OF CARE
Problem: Skin Injury Risk Increased  Goal: Skin Health and Integrity  Outcome: Progressing     Problem: Adult Inpatient Plan of Care  Goal: Plan of Care Review  Outcome: Progressing  Goal: Patient-Specific Goal (Individualized)  Outcome: Progressing  Goal: Absence of Hospital-Acquired Illness or Injury  Outcome: Progressing  Goal: Optimal Comfort and Wellbeing  Outcome: Progressing  Goal: Readiness for Transition of Care  Outcome: Progressing     Problem: Infection  Goal: Absence of Infection Signs and Symptoms  Outcome: Progressing     Problem: Sepsis/Septic Shock  Goal: Optimal Coping  Outcome: Progressing  Goal: Absence of Bleeding  Outcome: Progressing  Goal: Blood Glucose Level Within Targeted Range  Outcome: Progressing  Goal: Absence of Infection Signs and Symptoms  Outcome: Progressing  Goal: Optimal Nutrition Intake  Outcome: Progressing     Problem: Pneumonia  Goal: Fluid Balance  Outcome: Progressing  Goal: Resolution of Infection Signs and Symptoms  Outcome: Progressing  Goal: Effective Oxygenation and Ventilation  Outcome: Progressing

## 2024-11-03 NOTE — PLAN OF CARE
Problem: Skin Injury Risk Increased  Goal: Skin Health and Integrity  11/3/2024 1558 by Ky Contreras RN  Outcome: Progressing  11/3/2024 1557 by Ky Contreras RN  Outcome: Progressing     Problem: Adult Inpatient Plan of Care  Goal: Plan of Care Review  11/3/2024 1558 by Ky Contreras RN  Outcome: Progressing  11/3/2024 1557 by Ky Contreras RN  Outcome: Progressing  Goal: Patient-Specific Goal (Individualized)  11/3/2024 1558 by Ky Contreras RN  Outcome: Progressing  11/3/2024 1557 by Ky Contreras RN  Outcome: Progressing  Goal: Absence of Hospital-Acquired Illness or Injury  11/3/2024 1558 by Ky Contreras RN  Outcome: Progressing  11/3/2024 1557 by Ky Contreras RN  Outcome: Progressing  Goal: Optimal Comfort and Wellbeing  11/3/2024 1558 by Ky Contreras RN  Outcome: Progressing  11/3/2024 1557 by Ky Contreras RN  Outcome: Progressing  Goal: Readiness for Transition of Care  11/3/2024 1558 by Ky Contreras RN  Outcome: Progressing  11/3/2024 1557 by Ky Contreras RN  Outcome: Progressing     Problem: Infection  Goal: Absence of Infection Signs and Symptoms  11/3/2024 1558 by Ky Contreras RN  Outcome: Progressing  11/3/2024 1557 by Ky Contreras RN  Outcome: Progressing     Problem: Sepsis/Septic Shock  Goal: Optimal Coping  11/3/2024 1558 by Ky Contreras RN  Outcome: Progressing  11/3/2024 1557 by Ky Contreras RN  Outcome: Progressing  Goal: Absence of Bleeding  11/3/2024 1558 by Ky Contreras RN  Outcome: Progressing  11/3/2024 1557 by Ky Contreras RN  Outcome: Progressing  Goal: Blood Glucose Level Within Targeted Range  11/3/2024 1558 by Ky Contreras RN  Outcome: Progressing  11/3/2024 1557 by Ky Contreras RN  Outcome: Progressing  Goal: Absence of Infection Signs and Symptoms  11/3/2024 1558 by Ky Contreras RN  Outcome: Progressing  11/3/2024 1557 by yK Contreras, RN  Outcome: Progressing  Goal: Optimal Nutrition Intake  11/3/2024 1558 by Ben,  MÓNICA Mcpherson  Outcome: Progressing  11/3/2024 1557 by Ky Contreras RN  Outcome: Progressing     Problem: Pneumonia  Goal: Fluid Balance  11/3/2024 1558 by Ky Contreras RN  Outcome: Progressing  11/3/2024 1557 by Ky Contreras RN  Outcome: Progressing  Goal: Resolution of Infection Signs and Symptoms  11/3/2024 1558 by Ky Contreras RN  Outcome: Progressing  11/3/2024 1557 by Ky Contreras RN  Outcome: Progressing  Goal: Effective Oxygenation and Ventilation  11/3/2024 1558 by Ky Contreras RN  Outcome: Progressing  11/3/2024 1557 by Ky Contreras RN  Outcome: Progressing     Problem: Wound  Goal: Optimal Coping  Outcome: Progressing  Goal: Optimal Functional Ability  Outcome: Progressing  Goal: Absence of Infection Signs and Symptoms  Outcome: Progressing  Goal: Improved Oral Intake  Outcome: Progressing  Goal: Optimal Pain Control and Function  Outcome: Progressing  Goal: Skin Health and Integrity  Outcome: Progressing  Goal: Optimal Wound Healing  Outcome: Progressing     Problem: Fall Injury Risk  Goal: Absence of Fall and Fall-Related Injury  Outcome: Progressing     Problem: Coping Ineffective  Goal: Effective Coping  Outcome: Progressing

## 2024-11-03 NOTE — PROGRESS NOTES
[facility] - [facility]    Cardiology  Progress Note    Patient Name: Delio Daniel Jr.  MRN: [id number]  Admission Date: 10/20/2024  Hospital Length of Stay: 14 days  Code Status: Full Code   Attending Provider: Itz Francisco MD   Consulting Provider: Brodie Donovan MD  Primary Care Physician: Jl Briones MD  Principal Problem:UTI (urinary tract infection)    Patient information was obtained from past medical records, ER records, and primary team.     Subjective:   Chief Complaint/Reason for Consult: AF    HPI:   Mr. Daniel is a 70 y/o male with a history of AFIB on Xarelto, CVA, SSS, HTN, HLD, CAD, who is known to [facility], Dr. Kimbrough. He presented to the ER on 8.3.24 from [facility] with decreased responsiveness, severe sepsis, and recurrent UTI. 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 Patient nonverbal at baseline. In the ED, patient was febrile, tachycardic with -190s, tachypneic, /60. EKG showed Afib with RVR. Diltiazem drip started in ED for acute HR Control. Patient required mechanical ventilation and admitted to the ICU. Patient treated for sepsis related to UTI. [facility] consulted for AF Management.     Hospital Course:  10.22.24: Resting in bed. AF CVR HR 70's. On Amiodarone Infusion. BP Controlled. Trach/Vented, FIO2 40%.  11.1.24: NAD Noted. BP Low Normal. Beta blockade held a few days back due to marginal BP. On Amiodarone Infusion for acute HR Control. AF RVR. Re consulted for AF Management.   11.2.24: In A-fib with HR in 80s this am.    11.3.24: HR controlled this AM.  Has been more lethargic, only responds to painful stimuli.  ICU working up.      PMH: Arthritis, AF, BPH, Cardiac Arrest, CAD, Renal Cyst, Diverticulosis, Hyperlipidemia, Hypertension, MI, Obesity, Lever Steatosis, Stroke  PSH: Pacemaker, LHC, Colonoscopy, EGD, Colon Excision, Tracheostomy  Family History: Mother - HTN; Sister -  HTN; Brother - HTN   Social History: Tobacco- Negative, Alcohol- Negative, Substance Abuse- Negative    Previous Cardiac Diagnostics:   Echocardiogram (10.22.24):  Left Ventricle: The left ventricle is normal in size. Mildly increased wall thickness. There is normal systolic function with a visually estimated ejection fraction of 65%. Grade I diastolic dysfunction.  Right Ventricle: Normal right ventricular cavity size. Systolic function is normal.  Aortic Valve: There is mild aortic valve sclerosis.  Mitral Valve: There is mild (1+) regurgitation.  Tricuspid Valve: There is mild (1+) regurgitation.  The estimated pulmonary artery systolic pressure is 21 mmHg.  AICD/pacemaker lead noted.  No evidence of vegetation noted.  No evidence of valvular endocarditis noted.  If clinical suspicion is high would recommend transesophageal echocardiogram.    Echocardiogram (5.3.24):  EF 65%  RV with Normal RV Function.  MR- Mild. TR- Mild.   Pericardial Effusion- None. Anterior Fat Pad Noted.     ECHO (1.15.24):  TDS. No Definity contrast available for use. Left Ventricle: The left ventricle is normal in size. Moderately increased wall thickness. Normal wall motion. There is normal systolic function. Ejection fraction by visual approximation is 55%. Right Ventricle: Normal right ventricular cavity size. Systolic function is borderline low. Left Atrium: Left atrium is severely dilated. Right Atrium: Right atrium is mildly dilated. Mitral Valve: There is bileaflet sclerosis. There is mild regurgitation. IVC/SVC: Normal venous pressure at 3 mmHg.     Venous US LUE (12.26.23):  There was no evidence of deep vein thrombosis in the left upper extremity.   A superficial thrombosis was identified in the left cephalic vein.      Carotid US (12.19.23):  The right internal carotid artery demonstrated less than 50% stenosis.  The left internal carotid artery demonstrated less than 50% stenosis.  The bilateral vertebral arteries were patent  with antegrade flow.  Bilateral internal carotid arteries demonstrated decreased velocities starting from the common carotid arteries.      [facility] (5.25.18):   LM: Normal. Normal size and bifurcation. LAD: Abnormal. Large, mild atherosclerotic plaque, moderately large diagonals. Mid LAD 30% stenosis. The lesion was tubular and eccentric. LCX: abnormal and Large, mild atherosclerotic plaque, large OM with mild narrowing.OM 1 35% stenosis. RCA: normal. Large and no significant disease.        Review of patient's allergies indicates:  No Known Allergies  No current facility-administered medications on file prior to encounter.     Current Outpatient Medications on File Prior to Encounter   Medication Sig    ascorbic Acid (VITAMIN C) 500 mg CpSR 500 mg 2 (two) times daily. Per PEG tube    busPIRone (BUSPAR) 5 MG Tab 5 mg by Per G Tube route 3 (three) times daily.    cholestyramine (QUESTRAN) 4 gram packet 4 g once daily. Via PEG tube    cholestyramine-aspartame (QUESTRAN LIGHT) 4 gram PwPk 1 packet (4 g total) by Per G Tube route 2 (two) times daily.    diltiaZEM (CARDIZEM) 60 MG tablet 60 mg by Per G Tube route every 6 (six) hours.    ferrous sulfate 300 mg (60 mg iron)/5 mL syrup Take 5 mLs (300 mg total) by mouth once daily.    finasteride (PROSCAR) 5 mg tablet Take 1 tablet (5 mg total) by mouth once daily.    furosemide (LASIX) 80 MG tablet 80 mg by Per G Tube route as needed (for Edema).    L. acidophilus/L.bulgaricus (FLORANEX ORAL) 1 packet by PEG Tube route Daily.    levetiracetam 500 mg/5 mL (5 mL) Soln 1,500 mg by Per G Tube route 2 (two) times daily. Nursing home reports medication hold by MD until level redraw on Monday.    LIPITOR 10 mg tablet 10 mg by Per G Tube route once daily.    metoclopramide HCl (REGLAN) 5 mg/5 mL Soln 10 mg by Per G Tube route 3 (three) times daily before meals.    metoprolol tartrate (LOPRESSOR) 100 MG tablet 100 mg by Per G Tube route 2 (two) times daily.    miconazole NITRATE 2 %  (MICOTIN) 2 % top powder Apply topically 2 (two) times daily.    multivitamin (THERAGRAN) per tablet 1 tablet by Per G Tube route once daily.    pantoprazole (PROTONIX) 40 mg injection Inject 40 mg into the vein 2 (two) times daily.    polyethylene glycol (GLYCOLAX) 17 gram PwPk Take 17 g by mouth 2 (two) times daily as needed for Constipation.    protein supplement (PROMOD PROTEIN ORAL) 30 mLs by Per G Tube route once daily.    QUEtiapine (SEROQUEL) 25 MG Tab 25 mg by Per G Tube route 2 (two) times daily.    scopolamine (TRANSDERM-SCOP) 1.3-1.5 mg (1 mg over 3 days) Place 1 patch onto the skin Every 3 (three) days.    sucralfate (CARAFATE) 1 gram tablet 1 tablet (1 g total) by Per G Tube route 4 (four) times daily before meals and nightly.    tamsulosin (FLOMAX) 0.4 mg Cap Take 1 capsule (0.4 mg total) by mouth every evening.    venlafaxine 75 mg TR24 1 tablet by Per G Tube route 2 (two) times a day.    XARELTO 20 mg Tab 1 tablet by Per G Tube route nightly.     Review of Systems   Unable to perform ROS: Patient nonverbal     Objective:     Vital Signs (Most Recent):  Temp: 99.6 °F (37.6 °C) (11/03/24 0800)  Pulse: 102 (11/03/24 0805)  Resp: (!) 27 (11/03/24 0805)  BP: 123/73 (11/03/24 0805)  SpO2: (!) 94 % (11/03/24 0805) Vital Signs (24h Range):  Temp:  [98.3 °F (36.8 °C)-100 °F (37.8 °C)] 99.6 °F (37.6 °C)  Pulse:  [] 102  Resp:  [0-28] 27  SpO2:  [83 %-100 %] 94 %  BP: ()/(48-86) 123/73   Weight: 69.1 kg (152 lb 5.4 oz)  Body mass index is 22.49 kg/m².  SpO2: (!) 94 %       Intake/Output Summary (Last 24 hours) at 11/3/2024 0914  Last data filed at 11/3/2024 0800  Gross per 24 hour   Intake 6654.47 ml   Output 4600 ml   Net 2054.47 ml     Lines/Drains/Airways       Drain  Duration                  Urethral Catheter 10/20/24 2210 Silicone 16 Fr. 13 days         Rectal Tube 10/25/24 2030 8 days         Gastrostomy/Enterostomy 10/30/24 1200 Gastrostomy-jejunostomy feeding 3 days              Airway   "Duration             Adult Surgical Airway 08/19/24 0120 Shiley Extra Large Cuffed Distal 6.0/ 75mm 76 days              Peripheral Intravenous Line  Duration                  Midline Catheter - Single Lumen 10/20/24 1530 Right 13 days         Peripheral IV - Single Lumen 10/20/24 1600 18 G Anterior;Distal;Left Upper Arm 13 days                  Significant Labs:   Chemistries:   Recent Labs   Lab 10/28/24  0711 10/29/24  0335 10/30/24  0807 10/31/24  0607 11/03/24  0348    142 140 140 143   K 3.2* 2.9* 3.5 3.7 2.8*   * 106 108* 106 108*   CO2 25 22* 20* 20* 28   BUN 11.3 7.0* 5.8* 6.6* 18.2   CREATININE 0.61* 0.58* 0.58* 0.66* 0.56*   CALCIUM 7.7* 8.3* 8.2* 8.1* 7.7*   BILITOT 0.9 1.1 0.9 0.8 0.5   ALKPHOS 108 117 120 123 106   ALT 11 11 10 10 9   AST 19 19 18 17 12   GLUCOSE 68* 61* 42* 49* 98   MG 1.80  --   --   --   --         CBC/Anemia Labs: Coags:    Recent Labs   Lab 10/30/24  0807 10/31/24  0318 11/03/24  0348   WBC 8.45 10.40 9.29   HGB 9.4* 8.8* 7.7*   HCT 29.5* 29.7* 24.3*    236 243   MCV 82.6 87.6 83.2   RDW 16.8 17.1* 18.1*    No results for input(s): "PT", "INR", "APTT" in the last 168 hours.     Significant Imaging:  Imaging Results              X-Ray Chest AP Portable (Final result)  Result time 10/20/24 19:36:31      Final result by Sami Taylor MD (10/20/24 19:36:31)                   Impression:      No acute cardiopulmonary process identified.      Electronically signed by: Sami Taylor  Date:    10/20/2024  Time:    19:36               Narrative:    EXAMINATION:  XR CHEST AP PORTABLE    CLINICAL HISTORY:  Sepsis, unspecified organism    TECHNIQUE:  One view    COMPARISON:  August 21, 2020.    FINDINGS:  Cardiopericardial silhouette appearance is similar.  Tracheostomy cannula is in similar location.  Right chest implanted cardiac device electrodes terminate within the right atrium and right ventricle.  No acute dense focal or segmental consolidation, congestive process, " pleural effusions or pneumothorax.                                      Telemetry:  AF CVR    Physical Exam  Vitals and nursing note reviewed.   Constitutional:       General: He is not in acute distress.     Appearance: He is ill-appearing.   Neck:      Comments: Tracheostomy  Cardiovascular:      Rate and Rhythm: Normal rate. Rhythm irregular.   Pulmonary:      Effort: Pulmonary effort is normal. No respiratory distress.      Comments: Mechanical Ventilation- Vent associated breath sounds.   Abdominal:      Palpations: Abdomen is soft.   Musculoskeletal:      Right lower leg: No edema.      Left lower leg: No edema.      Comments: Legs are Warm.   Skin:     General: Skin is warm and dry.   Neurological:      Comments: Responsive to painful stimuli       Home Medications:   No current facility-administered medications on file prior to encounter.     Current Outpatient Medications on File Prior to Encounter   Medication Sig Dispense Refill    ascorbic Acid (VITAMIN C) 500 mg CpSR 500 mg 2 (two) times daily. Per PEG tube      busPIRone (BUSPAR) 5 MG Tab 5 mg by Per G Tube route 3 (three) times daily.      cholestyramine (QUESTRAN) 4 gram packet 4 g once daily. Via PEG tube      cholestyramine-aspartame (QUESTRAN LIGHT) 4 gram PwPk 1 packet (4 g total) by Per G Tube route 2 (two) times daily. 180 packet 3    diltiaZEM (CARDIZEM) 60 MG tablet 60 mg by Per G Tube route every 6 (six) hours.      ferrous sulfate 300 mg (60 mg iron)/5 mL syrup Take 5 mLs (300 mg total) by mouth once daily. 450 mL 0    finasteride (PROSCAR) 5 mg tablet Take 1 tablet (5 mg total) by mouth once daily. 30 tablet 11    furosemide (LASIX) 80 MG tablet 80 mg by Per G Tube route as needed (for Edema).      L. acidophilus/L.bulgaricus (FLORANEX ORAL) 1 packet by PEG Tube route Daily.      levetiracetam 500 mg/5 mL (5 mL) Soln 1,500 mg by Per G Tube route 2 (two) times daily. Nursing home reports medication hold by MD until level redraw on Monday.       LIPITOR 10 mg tablet 10 mg by Per G Tube route once daily.      metoclopramide HCl (REGLAN) 5 mg/5 mL Soln 10 mg by Per G Tube route 3 (three) times daily before meals.      metoprolol tartrate (LOPRESSOR) 100 MG tablet 100 mg by Per G Tube route 2 (two) times daily.      miconazole NITRATE 2 % (MICOTIN) 2 % top powder Apply topically 2 (two) times daily.      multivitamin (THERAGRAN) per tablet 1 tablet by Per G Tube route once daily.      pantoprazole (PROTONIX) 40 mg injection Inject 40 mg into the vein 2 (two) times daily.      polyethylene glycol (GLYCOLAX) 17 gram PwPk Take 17 g by mouth 2 (two) times daily as needed for Constipation.      protein supplement (PROMOD PROTEIN ORAL) 30 mLs by Per G Tube route once daily.      QUEtiapine (SEROQUEL) 25 MG Tab 25 mg by Per G Tube route 2 (two) times daily.      scopolamine (TRANSDERM-SCOP) 1.3-1.5 mg (1 mg over 3 days) Place 1 patch onto the skin Every 3 (three) days.      sucralfate (CARAFATE) 1 gram tablet 1 tablet (1 g total) by Per G Tube route 4 (four) times daily before meals and nightly.      tamsulosin (FLOMAX) 0.4 mg Cap Take 1 capsule (0.4 mg total) by mouth every evening. 30 capsule 11    venlafaxine 75 mg TR24 1 tablet by Per G Tube route 2 (two) times a day.      XARELTO 20 mg Tab 1 tablet by Per G Tube route nightly.       Current Schedule Inpatient Medications:   atorvastatin  10 mg Per G Tube Daily    digoxin  0.25 mg Per G Tube Daily    linezolid  600 mg Intravenous Q12H    meropenem IV (PEDS and ADULTS)  2 g Intravenous Q8H    metoprolol tartrate  12.5 mg Per G Tube BID    pantoprazole  40 mg Intravenous Daily    QUEtiapine  50 mg Per G Tube BID    rivaroxaban  20 mg Per G Tube Nightly     Continuous Infusions:   amiodarone in dextrose 5%  0.5 mg/min Intravenous Continuous 16.7 mL/hr at 11/03/24 0600 0.5 mg/min at 11/03/24 0600    lactated ringers   Intravenous Continuous 100 mL/hr at 11/03/24 0600 Rate Verify at 11/03/24 0600       Assessment:    Persistent AF     - [id number] Score 4 (4.8% Stroke Risk per Year)    - on Xarelto for Stroke Risk Reduction    - Amio was started to help rate in the setting of hypotension    - HR better controlled today with some BB/Dig  CAD    - C (5.25.18): LM: Normal. Normal size and bifurcation. LAD: Abnormal. Large, mild atherosclerotic plaque, moderately large diagonals. Mid LAD 30% stenosis. The lesion was tubular and eccentric. LCX: abnormal and Large, mild atherosclerotic plaque, large OM with mild narrowing.OM 1 35% stenosis. RCA: normal. Large and no significant disease.      - EF 65%  NSTEMI Type II due to Sepsis/UTI   SSS    - Dual Chamber ICD  History of Hypertension with Transient Episodes of Hypotension (Beta Blockade on hold)  Hyperlipidemia  Sepsis/ E. Faecium VRE & ESBL Klebsiella Bacteremia     - Leukocytosis    - Chest XR: No Acute Process    - Sacral Wound  UTI  Anemia   History of Hemorrhagic CVA (12/2023)    - Residual Left Sided Deficits    - S/P Peg Tube and Trach   Small Left Pleural Effusion   Hypokalemia  Arthritis  Dysphagia    - s/p PEG   BRIAN (Resolved)  History of Seizure Disorder  Liver Steatosis  Diverticulosis  BPH  Depression  Obesity   No known history of GI Bleed   AMS, ICU planning a CT    Plan:   Replete K (being done)  Increase Metoprolol to 25 mg BID  Cont Dig at current dose  Cont Amio for now, might try to stop later once the other meds further help HR control      Brodie Donovan MD  Cardiology  [facility] - [facility] ICU  11/03/2024

## 2024-11-04 LAB
ALBUMIN SERPL-MCNC: 2 G/DL (ref 3.4–4.8)
ALBUMIN/GLOB SERPL: 0.6 RATIO (ref 1.1–2)
ALP SERPL-CCNC: 104 UNIT/L (ref 40–150)
ALT SERPL-CCNC: 11 UNIT/L (ref 0–55)
ANION GAP SERPL CALC-SCNC: 9 MEQ/L
AST SERPL-CCNC: 13 UNIT/L (ref 5–34)
BASOPHILS # BLD AUTO: 0.03 X10(3)/MCL
BASOPHILS NFR BLD AUTO: 0.4 %
BILIRUB SERPL-MCNC: 0.6 MG/DL
BUN SERPL-MCNC: 16.2 MG/DL (ref 8.4–25.7)
C DIFF TOX A+B STL QL IA: NEGATIVE
CALCIUM SERPL-MCNC: 7.8 MG/DL (ref 8.8–10)
CHLORIDE SERPL-SCNC: 108 MMOL/L (ref 98–107)
CLOSTRIDIUM DIFFICILE GDH ANTIGEN (OHS): NEGATIVE
CO2 SERPL-SCNC: 27 MMOL/L (ref 23–31)
CREAT SERPL-MCNC: 0.55 MG/DL (ref 0.72–1.25)
CREAT/UREA NIT SERPL: 29
DIGOXIN SERPL-MCNC: 0.8 NG/ML (ref 0.8–2)
EOSINOPHIL # BLD AUTO: 0.11 X10(3)/MCL (ref 0–0.9)
EOSINOPHIL NFR BLD AUTO: 1.4 %
ERYTHROCYTE [DISTWIDTH] IN BLOOD BY AUTOMATED COUNT: 18.2 % (ref 11.5–17)
GFR SERPLBLD CREATININE-BSD FMLA CKD-EPI: >60 ML/MIN/1.73/M2
GLOBULIN SER-MCNC: 3.6 GM/DL (ref 2.4–3.5)
GLUCOSE SERPL-MCNC: 103 MG/DL (ref 82–115)
HCT VFR BLD AUTO: 22.9 % (ref 42–52)
HGB BLD-MCNC: 7.5 G/DL (ref 14–18)
IMM GRANULOCYTES # BLD AUTO: 0.06 X10(3)/MCL (ref 0–0.04)
IMM GRANULOCYTES NFR BLD AUTO: 0.8 %
LYMPHOCYTES # BLD AUTO: 2.03 X10(3)/MCL (ref 0.6–4.6)
LYMPHOCYTES NFR BLD AUTO: 26 %
MCH RBC QN AUTO: 26.8 PG (ref 27–31)
MCHC RBC AUTO-ENTMCNC: 32.8 G/DL (ref 33–36)
MCV RBC AUTO: 81.8 FL (ref 80–94)
MONOCYTES # BLD AUTO: 0.74 X10(3)/MCL (ref 0.1–1.3)
MONOCYTES NFR BLD AUTO: 9.5 %
NEUTROPHILS # BLD AUTO: 4.84 X10(3)/MCL (ref 2.1–9.2)
NEUTROPHILS NFR BLD AUTO: 61.9 %
NRBC BLD AUTO-RTO: 0 %
PHOSPHATE SERPL-MCNC: <0.7 MG/DL (ref 2.3–4.7)
PLATELET # BLD AUTO: 231 X10(3)/MCL (ref 130–400)
PMV BLD AUTO: 9.8 FL (ref 7.4–10.4)
POCT GLUCOSE: 116 MG/DL (ref 70–110)
POCT GLUCOSE: 116 MG/DL (ref 70–110)
POCT GLUCOSE: 126 MG/DL (ref 70–110)
POTASSIUM SERPL-SCNC: 3.2 MMOL/L (ref 3.5–5.1)
PROT SERPL-MCNC: 5.6 GM/DL (ref 5.8–7.6)
RBC # BLD AUTO: 2.8 X10(6)/MCL (ref 4.7–6.1)
SODIUM SERPL-SCNC: 144 MMOL/L (ref 136–145)
WBC # BLD AUTO: 7.81 X10(3)/MCL (ref 4.5–11.5)

## 2024-11-04 PROCEDURE — 63600175 PHARM REV CODE 636 W HCPCS: Performed by: GENERAL PRACTICE

## 2024-11-04 PROCEDURE — 94760 N-INVAS EAR/PLS OXIMETRY 1: CPT

## 2024-11-04 PROCEDURE — 20000000 HC ICU ROOM

## 2024-11-04 PROCEDURE — 25000003 PHARM REV CODE 250: Performed by: NURSE PRACTITIONER

## 2024-11-04 PROCEDURE — 36415 COLL VENOUS BLD VENIPUNCTURE: CPT | Performed by: NURSE PRACTITIONER

## 2024-11-04 PROCEDURE — 99900026 HC AIRWAY MAINTENANCE (STAT)

## 2024-11-04 PROCEDURE — 25000003 PHARM REV CODE 250

## 2024-11-04 PROCEDURE — 80053 COMPREHEN METABOLIC PANEL: CPT

## 2024-11-04 PROCEDURE — 27100171 HC OXYGEN HIGH FLOW UP TO 24 HOURS

## 2024-11-04 PROCEDURE — 63600175 PHARM REV CODE 636 W HCPCS: Performed by: INTERNAL MEDICINE

## 2024-11-04 PROCEDURE — 80162 ASSAY OF DIGOXIN TOTAL: CPT | Performed by: NURSE PRACTITIONER

## 2024-11-04 PROCEDURE — 25000003 PHARM REV CODE 250: Performed by: INTERNAL MEDICINE

## 2024-11-04 PROCEDURE — 99233 SBSQ HOSP IP/OBS HIGH 50: CPT | Mod: ,,,

## 2024-11-04 PROCEDURE — 99900035 HC TECH TIME PER 15 MIN (STAT)

## 2024-11-04 PROCEDURE — 84100 ASSAY OF PHOSPHORUS: CPT

## 2024-11-04 PROCEDURE — 97606 NEG PRS WND THER DME>50 SQCM: CPT

## 2024-11-04 PROCEDURE — 25000003 PHARM REV CODE 250: Performed by: GENERAL PRACTICE

## 2024-11-04 PROCEDURE — 63600175 PHARM REV CODE 636 W HCPCS

## 2024-11-04 PROCEDURE — 85025 COMPLETE CBC W/AUTO DIFF WBC: CPT

## 2024-11-04 PROCEDURE — 94003 VENT MGMT INPAT SUBQ DAY: CPT

## 2024-11-04 PROCEDURE — 99900031 HC PATIENT EDUCATION (STAT)

## 2024-11-04 RX ORDER — AMIODARONE HYDROCHLORIDE 200 MG/1
200 TABLET ORAL DAILY
Status: DISCONTINUED | OUTPATIENT
Start: 2024-11-04 | End: 2024-11-18 | Stop reason: HOSPADM

## 2024-11-04 RX ADMIN — LINEZOLID 600 MG: 600 INJECTION, SOLUTION INTRAVENOUS at 08:11

## 2024-11-04 RX ADMIN — RIVAROXABAN 20 MG: 10 TABLET, FILM COATED ORAL at 09:11

## 2024-11-04 RX ADMIN — SODIUM CHLORIDE 2 G: 9 INJECTION, SOLUTION INTRAVENOUS at 05:11

## 2024-11-04 RX ADMIN — AMIODARONE HYDROCHLORIDE 0.5 MG/MIN: 1.8 INJECTION, SOLUTION INTRAVENOUS at 05:11

## 2024-11-04 RX ADMIN — AMIODARONE HYDROCHLORIDE 200 MG: 200 TABLET ORAL at 09:11

## 2024-11-04 RX ADMIN — SODIUM CHLORIDE 2 G: 9 INJECTION, SOLUTION INTRAVENOUS at 09:11

## 2024-11-04 RX ADMIN — DIGOXIN 0.25 MG: 125 TABLET ORAL at 07:11

## 2024-11-04 RX ADMIN — DEXTROSE MONOHYDRATE 30 MMOL: 5 INJECTION, SOLUTION INTRAVENOUS at 01:11

## 2024-11-04 RX ADMIN — LINEZOLID 600 MG: 600 INJECTION, SOLUTION INTRAVENOUS at 09:11

## 2024-11-04 RX ADMIN — QUETIAPINE FUMARATE 50 MG: 25 TABLET ORAL at 09:11

## 2024-11-04 RX ADMIN — QUETIAPINE FUMARATE 50 MG: 25 TABLET ORAL at 07:11

## 2024-11-04 RX ADMIN — DEXTROSE MONOHYDRATE 30 MMOL: 5 INJECTION, SOLUTION INTRAVENOUS at 05:11

## 2024-11-04 RX ADMIN — ATORVASTATIN CALCIUM 10 MG: 10 TABLET, FILM COATED ORAL at 07:11

## 2024-11-04 RX ADMIN — METOPROLOL TARTRATE 25 MG: 25 TABLET, FILM COATED ORAL at 09:11

## 2024-11-04 RX ADMIN — SODIUM CHLORIDE 2 G: 9 INJECTION, SOLUTION INTRAVENOUS at 01:11

## 2024-11-04 RX ADMIN — POTASSIUM BICARBONATE 25 MEQ: 978 TABLET, EFFERVESCENT ORAL at 12:11

## 2024-11-04 RX ADMIN — PANTOPRAZOLE SODIUM 40 MG: 40 INJECTION, POWDER, LYOPHILIZED, FOR SOLUTION INTRAVENOUS at 07:11

## 2024-11-04 RX ADMIN — METOPROLOL TARTRATE 25 MG: 25 TABLET, FILM COATED ORAL at 07:11

## 2024-11-04 NOTE — PROGRESS NOTES
[facility] - Emergency Dept  Pulmonary Critical Care Note    Patient Name: Delio Daniel Jr.  MRN: [id number]  Admission Date: 10/20/2024  Hospital Length of Stay: 15 days  Code Status: Full Code  Attending Provider: Itz Francisco MD  Primary Care Provider: Jl Briones MD     Subjective:     HPI:   Patient is a 68 y/o male with extensive PMH who presented from NH on 10/20/24 with decreased responsiveness, severe sepsis, and recurrent UTI. 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     Patient transferred to ED from [facility] with lethargy and tachycardia. Family at bedside reports patient is nonverbal at baseline but typically more alert. In the ED, patient is febrile, tachycardic with -190s, tachypneic, /60. EKG shows Afib with RVR. Diltiazem drip started in ED. Labs are significant for WBC of 16.5, lactic acid of 3.3, Cr 1.48, BUN 45.3, Na 155, K+ 5.1, troponin elevated at 0.118. UA with evidence of UTI. Of note, pt was being treated outpatient for a UTI, currently on day 4 of 7 day Rocephin course. Urine culture 10/16/24 with multidrug resistant Klebsiella pneumonia and Proteus mirabilis with susceptibility to Cefepime. Patient received 3L of NS and initiated on Vanc and Cefepime in ED. Admitted to the ICU on mechanical ventilation via trach.    Hospital Course/Significant events:  10/20/24: Admitted to ICU for severe sepsis   10/30/2024: PEG tube extension    24 Hour Interval History:  NAEON. Vitals stable. Telemetry showing rate controlled irregular irregular rhythm consistent with rate controlled Afib.  See shows stable normocytic anemia but otherwise unremarkable.   CMP shows hypokalemia and hypoalbuminemia otherwise unremarkable.  We will replete potassium. Patient continues to open eyes only to name call and only able to move RUE and RLE in responsive to painful stimuli. Continues on metoprolol, digoxin, and amiodarone for Afib. Continues on IV merropenem for VRE enterococcus and ESBL klebsiella bacteremia.    Past Medical History:   Diagnosis Date    Arthritis     Atrial fibrillation     BPH (benign prostatic hyperplasia)     Cardiac arrest     Coronary artery disease     Cyst, kidney, acquired     Diverticulosis     Hyperlipidemia     Hypertension     MI (myocardial infarction)     Obesity     Steatosis of liver     Stroke        Past Surgical History:   Procedure Laterality Date    A-V CARDIAC PACEMAKER INSERTION Right     CARDIAC CATHETERIZATION      COLONOSCOPY W/ BIOPSIES      CRANIECTOMY Right 12/20/2023    Procedure: CRANIECTOMY;  Surgeon: Artem Can MD;  Location: [facility];  Service: Neurosurgery;  Laterality: Right;    ESOPHAGOGASTRODUODENOSCOPY W/ PEG N/A 1/2/2024    Procedure: PEG;  Surgeon: Tani Day MD;  Location: [facility] ENDOSCOPY;  Service: Gastroenterology;  Laterality: N/A;    ESOPHAGOGASTRODUODENOSCOPY W/ PEG N/A 10/30/2024    Procedure: EGD w/ Jtube placement;  Surgeon: Gabriele Salcido MD;  Location: [facility] ENDOSCOPY;  Service: Endoscopy;  Laterality: N/A;  with J tube extension    excision of colon      TRACHEOSTOMY N/A 12/29/2023    Procedure: CREATION, TRACHEOSTOMY;  Surgeon: Patricia Winslow MD;  Location: [facility];  Service: ENT;  Laterality: N/A;  REQ 1130 //  NEEDS 2 SCRUBS       Social History     Socioeconomic History    Marital status:     Number of children: 9   Occupational History    Occupation: retired   Tobacco Use    Smoking status: Never    Smokeless tobacco: Never   Substance and Sexual Activity    Alcohol use: Not Currently    Drug use: Not Currently    Sexual activity: Not Currently     Partners: Female      Social Drivers of Health     Financial Resource Strain: Patient Unable To Answer (10/21/2024)    Overall Financial Resource Strain (CARDIA)     Difficulty of Paying Living Expenses: Patient unable to answer   Food Insecurity: Patient Unable To Answer (10/21/2024)    Hunger Vital Sign     Worried About Running Out of Food in the Last Year: Patient unable to answer     Ran Out of Food in the Last Year: Patient unable to answer   Transportation Needs: Patient Unable To Answer (10/21/2024)    TRANSPORTATION NEEDS     Transportation : Patient unable to answer   Physical Activity: Sufficiently Active (8/5/2024)    Exercise Vital Sign     Days of Exercise per Week: 5 days     Minutes of Exercise per Session: 30 min   Stress: Patient Unable To Answer (10/21/2024)    [employer] - Occupational Stress Questionnaire     Feeling of Stress : Patient unable to answer   Housing Stability: Patient Unable To Answer (10/21/2024)    Housing Stability Vital Sign     Unable to Pay for Housing in the Last Year: Patient unable to answer     Homeless in the Last Year: Patient unable to answer       Current Outpatient Medications   Medication Instructions    ascorbic Acid (VITAMIN C) 500 mg, 2 times daily, Per PEG tube    busPIRone (BUSPAR) 5 mg, Per G Tube, 3 times daily    cholestyramine (QUESTRAN) 4 gram packet 4 g, Daily, Via PEG tube    cholestyramine-aspartame (QUESTRAN LIGHT) 4 gram PwPk 4 g, Per G Tube, 2 times daily    diltiaZEM (CARDIZEM) 60 mg, Per G Tube, Every 6 hours    ferrous sulfate 300 mg, Oral, Daily    finasteride (PROSCAR) 5 mg, Oral, Daily    furosemide (LASIX) 80 mg, Per G Tube, As needed (PRN)    L. acidophilus/L.bulgaricus (FLORANEX ORAL) 1 packet, PEG Tube, Daily    levetiracetam 1,500 mg, Per G Tube, 2 times daily, Nursing home reports medication hold by MD until level redraw on Monday.    LIPITOR 10 mg, Per G Tube, Daily    metoclopramide HCl (REGLAN) 10 mg, Per G Tube, 3 times  daily before meals    metoprolol tartrate (LOPRESSOR) 100 mg, Per G Tube, 2 times daily    miconazole NITRATE 2 % (MICOTIN) 2 % top powder Topical (Top), 2 times daily    multivitamin (THERAGRAN) per tablet 1 tablet, Per G Tube, Daily    pantoprazole (PROTONIX) 40 mg, Intravenous, 2 times daily    polyethylene glycol (GLYCOLAX) 17 g, Oral, 2 times daily PRN    protein supplement (PROMOD PROTEIN ORAL) 30 mLs, Per G Tube, Daily    QUEtiapine (SEROQUEL) 25 mg, Per G Tube, 2 times daily    scopolamine (TRANSDERM-SCOP) 1.3-1.5 mg (1 mg over 3 days) 1 patch, Transdermal, Every 3 days    sucralfate (CARAFATE) 1 g, Per G Tube, Before meals & nightly    tamsulosin (FLOMAX) 0.4 mg, Oral, Nightly    venlafaxine 75 mg TR24 1 tablet, Per G Tube, 2 times daily    XARELTO 20 mg Tab 1 tablet, Per G Tube, Nightly       Current Inpatient Medications   atorvastatin  10 mg Per G Tube Daily    digoxin  0.25 mg Per G Tube Daily    linezolid  600 mg Intravenous Q12H    meropenem IV (PEDS and ADULTS)  2 g Intravenous Q8H    metoprolol tartrate  25 mg Per G Tube BID    pantoprazole  40 mg Intravenous Daily    QUEtiapine  50 mg Per G Tube BID    rivaroxaban  20 mg Per G Tube Nightly       Current Intravenous Infusions   amiodarone in dextrose 5%  0.5 mg/min Intravenous Continuous 16.7 mL/hr at 11/04/24 0000 0.5 mg/min at 11/04/24 0000     Review of Systems   Unable to perform ROS: Mental status change        Objective:       Intake/Output Summary (Last 24 hours) at 11/4/2024 0323  Last data filed at 11/4/2024 0000  Gross per 24 hour   Intake 8742.54 ml   Output 5445 ml   Net 3297.54 ml         Vital Signs (Most Recent):  Temp: 99.8 °F (37.7 °C) (11/04/24 0000)  Pulse: 91 (11/04/24 0258)  Resp: 19 (11/04/24 0258)  BP: 103/65 (11/04/24 0200)  SpO2: (!) 93 % (11/04/24 0258)  Body mass index is 22.49 kg/m².  Weight: 69.1 kg (152 lb 5.4 oz) Vital Signs (24h Range):  Temp:  [99.6 °F (37.6 °C)-100 °F (37.8 °C)] 99.8 °F (37.7 °C)  Pulse:  []  91  Resp:  [12-29] 19  SpO2:  [77 %-100 %] 93 %  BP: ()/(57-84) 103/65     Physical Exam  Constitutional:       General: He is not in acute distress.     Appearance: He is ill-appearing.      Comments: Thin, Chronically ill-appearing   HENT:      Head: Normocephalic and atraumatic.      Mouth/Throat:      Mouth: Mucous membranes are dry.   Eyes:      General: No scleral icterus.        Right eye: No discharge.         Left eye: No discharge.      Extraocular Movements: Extraocular movements intact.      Conjunctiva/sclera: Conjunctivae normal.      Pupils: Pupils are equal, round, and reactive to light.   Cardiovascular:      Rate and Rhythm: Tachycardia present. Rhythm irregular.      Heart sounds: Normal heart sounds. No murmur heard.     No friction rub. No gallop.   Abdominal:      General: Abdomen is flat. There is no distension.      Palpations: Abdomen is soft.      Tenderness: There is no abdominal tenderness. There is no guarding.      Comments: J-tube in place; site clean and dry  Hypoactive bowel sounds   Musculoskeletal:      Comments: LUE and LLE in contracture    Skin:     General: Skin is warm and dry.      Coloration: Skin is not jaundiced or pale.      Findings: No bruising, erythema, lesion or rash.      Comments: Large sacral pressure ulcer. BLE with scattered superficial abrasions.    Neurological:      Comments: Non-sedated. Nonverbal at baseline.  Does not follow commands. Winces and withdraws to painful stimuli in RUE and RLE.       Lines/Drains/Airways       Drain  Duration                  Urethral Catheter 10/20/24 2210 Silicone 16 Fr. 14 days         Rectal Tube 10/25/24 2030 9 days         Gastrostomy/Enterostomy 10/30/24 1200 Gastrostomy-jejunostomy feeding 4 days              Airway  Duration             Adult Surgical Airway 08/19/24 0120 Wendi Santiago Large Cuffed Distal 6.0/ 75mm 77 days              Peripheral Intravenous Line  Duration                  Midline Catheter -  "Single Lumen 10/20/24 1530 Right 14 days         Peripheral IV - Single Lumen 10/20/24 1600 18 G Anterior;Distal;Left Upper Arm 14 days                    Significant Labs:    Lab Results   Component Value Date    WBC 7.81 11/04/2024    HGB 7.5 (L) 11/04/2024    HCT 22.9 (L) 11/04/2024    MCV 81.8 11/04/2024     11/04/2024           BMP  Lab Results   Component Value Date     11/04/2024    K 3.2 (L) 11/04/2024    CO2 27 11/04/2024    BUN 16.2 11/04/2024    CREATININE 0.55 (L) 11/04/2024    CALCIUM 7.8 (L) 11/04/2024    AGAP 9.0 11/04/2024    EGFRNONAA 61 04/23/2022         ABG  No results for input(s): "PH", "PO2", "PCO2", "HCO3", "POCBASEDEF" in the last 168 hours.      Mechanical Ventilation Support:  Vent Mode: A/C (11/04/24 0258)  Ventilator Initiated: Yes (10/20/24 1546)  Set Rate: 20 BPM (11/04/24 0258)  Vt Set: 500 mL (11/04/24 0258)  PEEP/CPAP: 5 cmH20 (11/04/24 0258)  Oxygen Concentration (%): 40 (11/04/24 0000)  Peak Airway Pressure: 33 cmH20 (11/04/24 0258)  Plateau Pressure: 3 cmH20 (11/01/24 0338)  Total Ve: 10.1 L/m (11/04/24 0258)  F/VT Ratio<105 (RSBI): (!) 63.12 (11/04/24 0258)    Significant Imaging:  I have reviewed the pertinent imaging within the past 24 hours.  No new imaging    Assessment/Plan:     Assessment  Sepsis  VRE Enterococcus and ESBL Klebsiella bacteremia  Klebsiella pneumoniae and Proteus mirabilis pneumonia  Afib with RVR  Chronic respiratory failure with tracheostomy ventilation at nursing home secondary to previous cerebrovascular accident.  Sacral wound  PEG tube feeding intolerance status post jejunum extension    Plan  Continue ICU care  Continue chronic mechanical ventilation via trach   Consulted cardiology for afib; continues on Digoxin, Lopressor, and amiodarone per cards  ID evaluated patient with recommendations to continue merrem (10/23-11/6) and linezolid (10/22-11/5)  Wound care and General surgery consulted; debrided sacral ulcer (11/02/2024); will " likely need wound vac 11/4  Continue enteral tube feeds    DVT Prophylaxis: home Xarelto   GI Prophylaxis: PPI     32 minutes of critical care was time spent personally by me on the following activities: development of treatment plan with patient or surrogate and bedside caregivers, discussions with consultants, evaluation of patient's response to treatment, examination of patient, ordering and performing treatments and interventions, ordering and review of laboratory studies, ordering and review of radiographic studies, pulse oximetry, re-evaluation of patient's condition.  This critical care time did not overlap with that of any other provider or involve time for any procedures.     Humberto Quintana MD  Pulmonary Critical Care Medicine  [facility]  DOS: 11/04/2024

## 2024-11-04 NOTE — SUBJECTIVE & OBJECTIVE
Subjective:     HPI:  Wound medicine re-check    The patient is a 69 year old male who presented to [facility] ED on 10/20/24 from [facility] with decreased responsiveness, sepsis, and recurrent UTI. Noted to be hypotensive with Afib and RVR, also requiring mechanical ventilation and admitted to the ICU.   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 Patient is non-verbal at baseline, contracted upper and lower extremities on left side. 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   Blood cultures on admission + Enterococcus faecium - VRE per BCID. Urine culture with Klebsiella and Proteus. ID guiding antibiotic stewardship, currently receiving meropenem and linezolid.     Patient admitted with unstageable pressure ulcer of sacrum; seen by inpatient wound nurse and treatment was iniatated, wound medicine consulted for evaluation and possible debridement.   No family present at time of assessment, all information gained through chart review. In June 2024 appears that patient was seen by wound  for sacral pressure ulcer, no visit notes or images availabe from this time frame. First image of sacral region in May 2024 with wound of sacrum/coccyx. In July 2024 images appears that wound had healed with remaining dark discoloration of sacrum/buttocks and then again noted with wound in images of late August 2024; appears to have evolved and worsened up to this point.  Initial evaluation on 10/22/24 - several pressure ulcers with most concerning is the sacral/coccyx unstageable ulcer. Also with fecal incontinence which is contaminating the wound as it is veryc close to the anus. He is contracted and we were unable to get full visualization and positioning for bedside debridement. Recommendations made for  palliative care consultation  to discuss overall goals of care.   Palliative care consulted and discussion held with family in which they have decided to continue with treatment.   Bedside debridement of sacral ulcer with Surgery on 11/2/24.     11/4//24 - wound re-check today. Met patient in room IN02, he is awake, non-verbal with trach on mechanical ventilation, yellow frothy secretions in trach and tubing, + cough. He is on ICU low air loss bed with bilateral heel boots on, wedge under his left side. Accompanied by ICU nurse as well as inpatient wound nurse.  Difficult to reposition because of contractures. Moves right upper arm freely, grabbing at nurse and bed rails in response to repositioning and wound treatment. No acute distress.   Patient had some hypotension over the weekend in the setting of significant amounts of voluminous watery stool; C-diff negative. Patient currently with rectal tube with large amount of watery brown stool in tubing and bag.   Sputum culture from 11/1 preliminary + Acinetobacter and Pseudomonas species.            Hospital Course:   No notes on file      Follow-up For: Procedure(s) (LRB):  EGD w/ Jtube placement (N/A)    Post-Operative Day: 5 Days Post-Op    Scheduled Meds:   amiodarone  200 mg Per G Tube Daily    atorvastatin  10 mg Per G Tube Daily    digoxin  0.25 mg Per G Tube Daily    linezolid  600 mg Intravenous Q12H    meropenem IV (PEDS and ADULTS)  2 g Intravenous Q8H    metoprolol tartrate  25 mg Per G Tube BID    pantoprazole  40 mg Intravenous Daily    potassium bicarbonate  25 mEq Oral Once    potassium phosphate IVPB  30 mmol Intravenous Once    QUEtiapine  50 mg Per G Tube BID    rivaroxaban  20 mg Per G Tube Nightly     Continuous Infusions:  PRN Meds:  Current Facility-Administered Medications:     acetaminophen, 650 mg, Per G Tube, Q6H PRN    dextromethorphan-guaiFENesin  mg/5 ml, 10 mL, Per G Tube, Q4H PRN    dextrose 10%, 12.5 g, Intravenous, PRN     dextrose 10%, 25 g, Intravenous, PRN    glucagon (human recombinant), 1 mg, Intramuscular, PRN    hydrALAZINE, 10 mg, Intravenous, Q4H PRN    metoclopramide HCl, 10 mg, Oral, Q8H PRN    naloxone, 0.02 mg, Intravenous, PRN    sodium chloride 0.9%, 10 mL, Intravenous, Q12H PRN    Review of Systems   Unable to perform ROS: Patient nonverbal     Objective:     Vital Signs (Most Recent):  Temp: 99.3 °F (37.4 °C) (11/04/24 0400)  Pulse: 84 (11/04/24 0805)  Resp: (!) 28 (11/04/24 0805)  BP: (!) 100/57 (11/04/24 0805)  SpO2: (!) 93 % (11/04/24 0805) Vital Signs (24h Range):  Temp:  [99.3 °F (37.4 °C)-99.8 °F (37.7 °C)] 99.3 °F (37.4 °C)  Pulse:  [] 84  Resp:  [12-29] 28  SpO2:  [77 %-100 %] 93 %  BP: ()/(57-82) 100/57     Weight: 69.1 kg (152 lb 5.4 oz)  Body mass index is 22.49 kg/m².     Physical Exam  Vitals reviewed.   Constitutional:       Comments: Rectal tube     HENT:      Head:      Comments: Right bone flap      Neck:      Comments: Tracheostomy   Pulmonary:      Comments: Mechanical ventilation    Abdominal:      Comments: PEG/J-Tube   Skin:     General: Skin is warm and dry.      Capillary Refill: Capillary refill takes less than 2 seconds.      Findings: Wound present.             Comments:     Left 1st and 2nd toe abrasions    Neurological:      Comments: Left upper and lower extremity contracted  Moves right upper extremity freely and reaches to grab onto nurse,  does not follow command          Sacrum: 6.5 x 7 x 3 cm with undermining 2 cm from 1-5 o'clock and 1 cm from 7-9 o'clock       Left lateral knee: 1.2 x 1 x 0.1 cm   Left medial knee: 0.8 x 0.5 x 0.1 cm       Left lateral ankle: 0.5 x 0.5 x 0.1 cm       Left 1st and 2nd toes              Laboratory:  A1C:   Recent Labs   Lab 08/26/24  1221   HGBA1C 5.3       BMP:   Recent Labs   Lab 11/04/24 0228      K 3.2*   *   CO2 27   BUN 16.2   CREATININE 0.55*   CALCIUM 7.8*       CBC:   Recent Labs   Lab 11/04/24 0228   WBC 7.81   RBC  2.80*   HGB 7.5*   HCT 22.9*      MCV 81.8   MCH 26.8*   MCHC 32.8*     CMP:   Recent Labs   Lab 11/04/24 0228   CALCIUM 7.8*   ALBUMIN 2.0*      K 3.2*   CO2 27   *   BUN 16.2   CREATININE 0.55*   ALKPHOS 104   ALT 11   AST 13   BILITOT 0.6       LFTs:   Recent Labs   Lab 11/04/24 0228   ALT 11   AST 13   ALKPHOS 104   BILITOT 0.6   ALBUMIN 2.0*       Microbiology Results (last 7 days)       Procedure Component Value Units Date/Time    Respiratory Culture [[id number]]  (Abnormal) Collected: 11/01/24 1619    Order Status: Completed Specimen: Respiratory Updated: 11/04/24 1134     Respiratory Culture Many ACINETOBACTER BAUMANNII      Many Pseudomonas aeruginosa     GRAM STAIN Quality 2+      Many Gram Negative Rods      Few Yeast    Clostridium Diff Toxin, A & B, EIA [[id number]]  (Normal) Collected: 11/02/24 1730    Order Status: Completed Specimen: Stool Updated: 11/04/24 0652     Clostridium Difficile GDH Antigen Negative     Clostridium Difficile Toxin A/B Negative              Diagnostic Results:  I have reviewed all pertinent imaging results/findings within the past 24 hours.

## 2024-11-04 NOTE — PROGRESS NOTES
[facility]    Cardiology  Progress Note    Patient Name: Delio Daniel Jr.  MRN: [id number]  Admission Date: 10/20/2024  Hospital Length of Stay: 15 days  Code Status: Full Code   Attending Provider: Itz Francisco MD   Consulting Provider: TRUMAN Erickson  Primary Care Physician: Jl Briones MD  Principal Problem:UTI (urinary tract infection)    Patient information was obtained from past medical records, ER records, and primary team.     Subjective:   Chief Complaint/Reason for Consult: AF    HPI:   Mr. Daniel is a 68 y/o male with a history of AFIB on Xarelto, CVA, SSS, HTN, HLD, CAD, who is known to [facility], Dr. Kimbrough. He presented to the ER on 8.3.24 from [facility] with decreased responsiveness, severe sepsis, and recurrent UTI. 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 Patient nonverbal at baseline. In the ED, patient was febrile, tachycardic with -190s, tachypneic, /60. EKG showed Afib with RVR. Diltiazem drip started in ED for acute HR Control. Patient required mechanical ventilation and admitted to the ICU. Patient treated for sepsis related to UTI. [facility] consulted for AF Management.     Hospital Course:  10.22.24: Resting in bed. AF CVR HR 70's. On Amiodarone Infusion. BP Controlled. Trach/Vented, FIO2 40%.  11.1.24: NAD Noted. BP Low Normal. Beta blockade held a few days back due to marginal BP. On Amiodarone Infusion for acute HR Control. AF RVR. Re consulted for AF Management.   11.2.24: In A-fib with HR in 80s this am.    11.3.24: HR controlled this AM.  Has been more lethargic, only responds to painful stimuli.  ICU working up.    11.4.24: NAD Noted. AF CVR. BP Stable.    PMH: Arthritis, AF, BPH, Cardiac Arrest, CAD, Renal Cyst, Diverticulosis, Hyperlipidemia, Hypertension, MI, Obesity, Lever Steatosis, Stroke  PSH: Pacemaker, LHC, Colonoscopy, EGD, Colon Excision,  Tracheostomy  Family History: Mother - HTN; Sister - HTN; Brother - HTN   Social History: Tobacco- Negative, Alcohol- Negative, Substance Abuse- Negative    Previous Cardiac Diagnostics:   Echocardiogram (10.22.24):  Left Ventricle: The left ventricle is normal in size. Mildly increased wall thickness. There is normal systolic function with a visually estimated ejection fraction of 65%. Grade I diastolic dysfunction.  Right Ventricle: Normal right ventricular cavity size. Systolic function is normal.  Aortic Valve: There is mild aortic valve sclerosis.  Mitral Valve: There is mild (1+) regurgitation.  Tricuspid Valve: There is mild (1+) regurgitation.  The estimated pulmonary artery systolic pressure is 21 mmHg.  AICD/pacemaker lead noted.  No evidence of vegetation noted.  No evidence of valvular endocarditis noted.  If clinical suspicion is high would recommend transesophageal echocardiogram.    Echocardiogram (5.3.24):  EF 65%  RV with Normal RV Function.  MR- Mild. TR- Mild.   Pericardial Effusion- None. Anterior Fat Pad Noted.     ECHO (1.15.24):  TDS. No Definity contrast available for use. Left Ventricle: The left ventricle is normal in size. Moderately increased wall thickness. Normal wall motion. There is normal systolic function. Ejection fraction by visual approximation is 55%. Right Ventricle: Normal right ventricular cavity size. Systolic function is borderline low. Left Atrium: Left atrium is severely dilated. Right Atrium: Right atrium is mildly dilated. Mitral Valve: There is bileaflet sclerosis. There is mild regurgitation. IVC/SVC: Normal venous pressure at 3 mmHg.     Venous US LUE (12.26.23):  There was no evidence of deep vein thrombosis in the left upper extremity.   A superficial thrombosis was identified in the left cephalic vein.      Carotid US (12.19.23):  The right internal carotid artery demonstrated less than 50% stenosis.  The left internal carotid artery demonstrated less than 50%  stenosis.  The bilateral vertebral arteries were patent with antegrade flow.  Bilateral internal carotid arteries demonstrated decreased velocities starting from the common carotid arteries.      [facility] (5.25.18):   LM: Normal. Normal size and bifurcation. LAD: Abnormal. Large, mild atherosclerotic plaque, moderately large diagonals. Mid LAD 30% stenosis. The lesion was tubular and eccentric. LCX: abnormal and Large, mild atherosclerotic plaque, large OM with mild narrowing.OM 1 35% stenosis. RCA: normal. Large and no significant disease.        Review of patient's allergies indicates:  No Known Allergies  No current facility-administered medications on file prior to encounter.     Current Outpatient Medications on File Prior to Encounter   Medication Sig    ascorbic Acid (VITAMIN C) 500 mg CpSR 500 mg 2 (two) times daily. Per PEG tube    busPIRone (BUSPAR) 5 MG Tab 5 mg by Per G Tube route 3 (three) times daily.    cholestyramine (QUESTRAN) 4 gram packet 4 g once daily. Via PEG tube    cholestyramine-aspartame (QUESTRAN LIGHT) 4 gram PwPk 1 packet (4 g total) by Per G Tube route 2 (two) times daily.    diltiaZEM (CARDIZEM) 60 MG tablet 60 mg by Per G Tube route every 6 (six) hours.    ferrous sulfate 300 mg (60 mg iron)/5 mL syrup Take 5 mLs (300 mg total) by mouth once daily.    finasteride (PROSCAR) 5 mg tablet Take 1 tablet (5 mg total) by mouth once daily.    furosemide (LASIX) 80 MG tablet 80 mg by Per G Tube route as needed (for Edema).    L. acidophilus/L.bulgaricus (FLORANEX ORAL) 1 packet by PEG Tube route Daily.    levetiracetam 500 mg/5 mL (5 mL) Soln 1,500 mg by Per G Tube route 2 (two) times daily. Nursing home reports medication hold by MD until level redraw on Monday.    LIPITOR 10 mg tablet 10 mg by Per G Tube route once daily.    metoclopramide HCl (REGLAN) 5 mg/5 mL Soln 10 mg by Per G Tube route 3 (three) times daily before meals.    metoprolol tartrate (LOPRESSOR) 100 MG tablet 100 mg by Per G Tube  route 2 (two) times daily.    miconazole NITRATE 2 % (MICOTIN) 2 % top powder Apply topically 2 (two) times daily.    multivitamin (THERAGRAN) per tablet 1 tablet by Per G Tube route once daily.    pantoprazole (PROTONIX) 40 mg injection Inject 40 mg into the vein 2 (two) times daily.    polyethylene glycol (GLYCOLAX) 17 gram PwPk Take 17 g by mouth 2 (two) times daily as needed for Constipation.    protein supplement (PROMOD PROTEIN ORAL) 30 mLs by Per G Tube route once daily.    QUEtiapine (SEROQUEL) 25 MG Tab 25 mg by Per G Tube route 2 (two) times daily.    scopolamine (TRANSDERM-SCOP) 1.3-1.5 mg (1 mg over 3 days) Place 1 patch onto the skin Every 3 (three) days.    sucralfate (CARAFATE) 1 gram tablet 1 tablet (1 g total) by Per G Tube route 4 (four) times daily before meals and nightly.    tamsulosin (FLOMAX) 0.4 mg Cap Take 1 capsule (0.4 mg total) by mouth every evening.    venlafaxine 75 mg TR24 1 tablet by Per G Tube route 2 (two) times a day.    XARELTO 20 mg Tab 1 tablet by Per G Tube route nightly.     Review of Systems   Unable to perform ROS: Patient nonverbal     Objective:     Vital Signs (Most Recent):  Temp: 99.3 °F (37.4 °C) (11/04/24 0400)  Pulse: 108 (11/04/24 0654)  Resp: (!) 28 (11/04/24 0654)  BP: 122/82 (11/04/24 0600)  SpO2: (!) 84 % (11/04/24 0654) Vital Signs (24h Range):  Temp:  [99.3 °F (37.4 °C)-99.8 °F (37.7 °C)] 99.3 °F (37.4 °C)  Pulse:  [] 108  Resp:  [12-29] 28  SpO2:  [77 %-100 %] 84 %  BP: ()/(57-84) 122/82   Weight: 69.1 kg (152 lb 5.4 oz)  Body mass index is 22.49 kg/m².  SpO2: (!) 84 %       Intake/Output Summary (Last 24 hours) at 11/4/2024 0751  Last data filed at 11/4/2024 0701  Gross per 24 hour   Intake 7920.95 ml   Output 4515 ml   Net 3405.95 ml     Lines/Drains/Airways       Drain  Duration                  Urethral Catheter 10/20/24 2210 Silicone 16 Fr. 14 days         Rectal Tube 10/25/24 2030 9 days         Gastrostomy/Enterostomy 10/30/24 1200  "Gastrostomy-jejunostomy feeding 4 days              Airway  Duration             Adult Surgical Airway 08/19/24 0120 Shiley Extra Large Cuffed Distal 6.0/ 75mm 77 days              Peripheral Intravenous Line  Duration                  Midline Catheter - Single Lumen 10/20/24 1530 Right 14 days         Peripheral IV - Single Lumen 10/20/24 1600 18 G Anterior;Distal;Left Upper Arm 14 days                  Significant Labs:   Chemistries:   Recent Labs   Lab 10/29/24  0335 10/30/24  0807 10/31/24  0607 11/03/24 0348 11/04/24 0228    140 140 143 144   K 2.9* 3.5 3.7 2.8* 3.2*    108* 106 108* 108*   CO2 22* 20* 20* 28 27   BUN 7.0* 5.8* 6.6* 18.2 16.2   CREATININE 0.58* 0.58* 0.66* 0.56* 0.55*   CALCIUM 8.3* 8.2* 8.1* 7.7* 7.8*   BILITOT 1.1 0.9 0.8 0.5 0.6   ALKPHOS 117 120 123 106 104   ALT 11 10 10 9 11   AST 19 18 17 12 13   GLUCOSE 61* 42* 49* 98 103   PHOS  --   --   --   --  <0.7*        CBC/Anemia Labs: Coags:    Recent Labs   Lab 10/31/24  0318 11/03/24 0348 11/04/24 0228   WBC 10.40 9.29 7.81   HGB 8.8* 7.7* 7.5*   HCT 29.7* 24.3* 22.9*    243 231   MCV 87.6 83.2 81.8   RDW 17.1* 18.1* 18.2*    No results for input(s): "PT", "INR", "APTT" in the last 168 hours.     Significant Imaging:  Imaging Results              X-Ray Chest AP Portable (Final result)  Result time 10/20/24 19:36:31      Final result by Sami Taylor MD (10/20/24 19:36:31)                   Impression:      No acute cardiopulmonary process identified.      Electronically signed by: Sami Taylor  Date:    10/20/2024  Time:    19:36               Narrative:    EXAMINATION:  XR CHEST AP PORTABLE    CLINICAL HISTORY:  Sepsis, unspecified organism    TECHNIQUE:  One view    COMPARISON:  August 21, 2020.    FINDINGS:  Cardiopericardial silhouette appearance is similar.  Tracheostomy cannula is in similar location.  Right chest implanted cardiac device electrodes terminate within the right atrium and right ventricle.  No " acute dense focal or segmental consolidation, congestive process, pleural effusions or pneumothorax.                                      Telemetry:  AF CVR    Physical Exam  Vitals and nursing note reviewed.   Constitutional:       General: He is not in acute distress.     Appearance: He is ill-appearing.   Neck:      Comments: Tracheostomy  Cardiovascular:      Rate and Rhythm: Normal rate. Rhythm irregular.   Pulmonary:      Effort: Pulmonary effort is normal. No respiratory distress.      Comments: Mechanical Ventilation- Vent associated breath sounds.   Abdominal:      Palpations: Abdomen is soft.   Musculoskeletal:      Right lower leg: No edema.      Left lower leg: No edema.      Comments: Legs are Warm.   Skin:     General: Skin is warm and dry.   Neurological:      Comments: Responsive to painful stimuli       Home Medications:   No current facility-administered medications on file prior to encounter.     Current Outpatient Medications on File Prior to Encounter   Medication Sig Dispense Refill    ascorbic Acid (VITAMIN C) 500 mg CpSR 500 mg 2 (two) times daily. Per PEG tube      busPIRone (BUSPAR) 5 MG Tab 5 mg by Per G Tube route 3 (three) times daily.      cholestyramine (QUESTRAN) 4 gram packet 4 g once daily. Via PEG tube      cholestyramine-aspartame (QUESTRAN LIGHT) 4 gram PwPk 1 packet (4 g total) by Per G Tube route 2 (two) times daily. 180 packet 3    diltiaZEM (CARDIZEM) 60 MG tablet 60 mg by Per G Tube route every 6 (six) hours.      ferrous sulfate 300 mg (60 mg iron)/5 mL syrup Take 5 mLs (300 mg total) by mouth once daily. 450 mL 0    finasteride (PROSCAR) 5 mg tablet Take 1 tablet (5 mg total) by mouth once daily. 30 tablet 11    furosemide (LASIX) 80 MG tablet 80 mg by Per G Tube route as needed (for Edema).      L. acidophilus/L.bulgaricus (FLORANEX ORAL) 1 packet by PEG Tube route Daily.      levetiracetam 500 mg/5 mL (5 mL) Soln 1,500 mg by Per G Tube route 2 (two) times daily. Nursing  home reports medication hold by MD until level redraw on Monday.      LIPITOR 10 mg tablet 10 mg by Per G Tube route once daily.      metoclopramide HCl (REGLAN) 5 mg/5 mL Soln 10 mg by Per G Tube route 3 (three) times daily before meals.      metoprolol tartrate (LOPRESSOR) 100 MG tablet 100 mg by Per G Tube route 2 (two) times daily.      miconazole NITRATE 2 % (MICOTIN) 2 % top powder Apply topically 2 (two) times daily.      multivitamin (THERAGRAN) per tablet 1 tablet by Per G Tube route once daily.      pantoprazole (PROTONIX) 40 mg injection Inject 40 mg into the vein 2 (two) times daily.      polyethylene glycol (GLYCOLAX) 17 gram PwPk Take 17 g by mouth 2 (two) times daily as needed for Constipation.      protein supplement (PROMOD PROTEIN ORAL) 30 mLs by Per G Tube route once daily.      QUEtiapine (SEROQUEL) 25 MG Tab 25 mg by Per G Tube route 2 (two) times daily.      scopolamine (TRANSDERM-SCOP) 1.3-1.5 mg (1 mg over 3 days) Place 1 patch onto the skin Every 3 (three) days.      sucralfate (CARAFATE) 1 gram tablet 1 tablet (1 g total) by Per G Tube route 4 (four) times daily before meals and nightly.      tamsulosin (FLOMAX) 0.4 mg Cap Take 1 capsule (0.4 mg total) by mouth every evening. 30 capsule 11    venlafaxine 75 mg TR24 1 tablet by Per G Tube route 2 (two) times a day.      XARELTO 20 mg Tab 1 tablet by Per G Tube route nightly.       Current Schedule Inpatient Medications:   atorvastatin  10 mg Per G Tube Daily    digoxin  0.25 mg Per G Tube Daily    linezolid  600 mg Intravenous Q12H    meropenem IV (PEDS and ADULTS)  2 g Intravenous Q8H    metoprolol tartrate  25 mg Per G Tube BID    pantoprazole  40 mg Intravenous Daily    potassium phosphate IVPB  30 mmol Intravenous Once    QUEtiapine  50 mg Per G Tube BID    rivaroxaban  20 mg Per G Tube Nightly     Continuous Infusions:   amiodarone in dextrose 5%  0.5 mg/min Intravenous Continuous 16.7 mL/hr at 11/04/24 0701 0.5 mg/min at 11/04/24 0701        Assessment:   Persistent AF     - [id number] Score 4 (4.8% Stroke Risk per Year)    - on Xarelto for Stroke Risk Reduction    - Amio was started to help rate in the setting of hypotension  CAD    - C (5.25.18): LM: Normal. Normal size and bifurcation. LAD: Abnormal. Large, mild atherosclerotic plaque, moderately large diagonals. Mid LAD 30% stenosis. The lesion was tubular and eccentric. LCX: abnormal and Large, mild atherosclerotic plaque, large OM with mild narrowing.OM 1 35% stenosis. RCA: normal. Large and no significant disease.      - EF 65%  NSTEMI Type II due to Sepsis/UTI   SSS    - Dual Chamber ICD  History of Hypertension with Transient Episodes of Hypotension (BP Now Stable)  Hyperlipidemia  Sepsis/ E. Faecium VRE & ESBL Klebsiella Bacteremia     - Leukocytosis    - Chest XR: No Acute Process    - Sacral Wound  UTI  Anemia   History of Hemorrhagic CVA (12/2023)    - Residual Left Sided Deficits    - S/P Peg Tube and Trach   Small Left Pleural Effusion   Hypokalemia  Arthritis  Dysphagia    - s/p PEG   BRIAN (Resolved)  History of Seizure Disorder  Liver Steatosis  Diverticulosis  BPH  Depression  Obesity   No known history of GI Bleed   AMS, ICU planning a CT    Plan:   Continue Metoprolol Tartrate 25 mg BID  Continue Digoxin 0.25 Mg PO Daily  Transition to Amiodarone 200 Mg PO Daily  Check Digoxin level in 3 days  Will be available. Call if needed.     TRUMAN Erickson  Cardiology  [facility]  11/04/2024

## 2024-11-04 NOTE — CONSULTS
[facility]  Wound Care    Patient Name:  Delio Daniel Jr.   MRN:  [id number]  Date: 11/4/2024  Diagnosis: UTI (urinary tract infection)    History:     Past Medical History:   Diagnosis Date    Arthritis     Atrial fibrillation     BPH (benign prostatic hyperplasia)     Cardiac arrest     Coronary artery disease     Cyst, kidney, acquired     Diverticulosis     Hyperlipidemia     Hypertension     MI (myocardial infarction)     Obesity     Steatosis of liver     Stroke        Social History     Socioeconomic History    Marital status:     Number of children: 9   Occupational History    Occupation: retired   Tobacco Use    Smoking status: Never    Smokeless tobacco: Never   Substance and Sexual Activity    Alcohol use: Not Currently    Drug use: Not Currently    Sexual activity: Not Currently     Partners: Female     Social Drivers of Health     Financial Resource Strain: Patient Unable To Answer (10/21/2024)    Overall Financial Resource Strain (CARDIA)     Difficulty of Paying Living Expenses: Patient unable to answer   Food Insecurity: Patient Unable To Answer (10/21/2024)    Hunger Vital Sign     Worried About Running Out of Food in the Last Year: Patient unable to answer     Ran Out of Food in the Last Year: Patient unable to answer   Transportation Needs: Patient Unable To Answer (10/21/2024)    TRANSPORTATION NEEDS     Transportation : Patient unable to answer   Physical Activity: Sufficiently Active (8/5/2024)    Exercise Vital Sign     Days of Exercise per Week: 5 days     Minutes of Exercise per Session: 30 min   Stress: Patient Unable To Answer (10/21/2024)    [employer] - Occupational Stress Questionnaire     Feeling of Stress : Patient unable to answer   Housing Stability: Patient Unable To Answer (10/21/2024)    Housing Stability Vital Sign     Unable to Pay for Housing in the Last Year: Patient unable to answer     Homeless in the Last  Year: Patient unable to answer       Precautions:     Allergies as of 10/20/2024    (No Known Allergies)       WO Assessment Details/Treatment        11/04/24 1000   WOCN Assessment   Visit Date 11/04/24   Visit Time 1000   Consult Type New   WOCN Speciality Wound   WOCN List wound vac   Intervention chart review;assessed;applied   Teaching on-going   Skin Interventions   Dehiscence Prevention/Management abdominal binder secured in place   Device Skin Pressure Protection absorbent pad utilized/changed;adhesive use limited;positioning supports utilized;pressure points protected;tubing/devices free from skin contact   Pressure Reduction Devices foam padding utilized;heel offloading device utilized;positioning supports utilized;pressure-redistributing mattress utilized   Pressure Reduction Techniques heels elevated off bed;positioned off wounds;pressure points protected;weight shift assistance provided   Skin Protection incontinence pads utilized;transparent dressing maintained   Positioning   Body Position 30 degrees   Head of Bed (HOB) Positioning HOB at 30-45 degrees   Positioning/Transfer Devices pillows;wedge;in use        Wound 10/20/24 2100 Pressure Injury Sacral spine   Date First Assessed/Time First Assessed: 10/20/24 2100   Present on Original Admission: Yes  Primary Wound Type: Pressure Injury  Location: Sacral spine  Is this injury device related?: No   Wound Image    Pressure Injury Stage 4   Dressing Appearance Intact;Moist drainage   Drainage Amount Moderate   Drainage Characteristics/Odor Brown;Serosanguineous   Appearance Pink;Red;Yellow   Tissue loss description Full thickness   Black (%), Wound Tissue Color 0 %   Red (%), Wound Tissue Color 50 %   Yellow (%), Wound Tissue Color 50 %   Periwound Area Pink;Dry;Scar tissue   Wound Edges Irregular;Jagged   Wound Length (cm) 6.5 cm   Wound Width (cm) 7 cm   Wound Depth (cm) 3 cm   Wound Volume (cm^3) 136.5 cm^3   Wound Surface Area (cm^2) 45.5 cm^2    Care Cleansed with:;Antimicrobial agent  (vashe)   Dressing Applied  (NPWT @125mmHg)        Negative Pressure Wound Therapy  11/04/24 1030 posterior   Placement Date/Time: 11/04/24 1030   Orientation: posterior  Location: Sacral Spine   NPWT Type Vacuum Therapy   Therapy Setting NPWT Continuous therapy   Pressure Setting NPWT 125 mmHg   Therapy Interventions NPWT Seal intact;other (see comments)  (applied wound vac w/ wound care NP)   Sponges Inserted NPWT 1;Black  (1 versatel, 1 black foam tracked to another black foam)        Urethral Catheter 10/20/24 2210 Silicone 16 Fr.   Placement Date/Time: 10/20/24 2210   Hand Hygiene: Performed  Inserted by: RN   Name: Jasbir Horowitz RN  Name of 2nd Person Present for Insertion: Conchita Hunt RN  Insertion attempts (enter comment if more than 2 attempts): 1  Catheter Typ...   Output (mL) 125 mL     WOCN consulted for wound vac placement to sacral wound. Wound Care NPMarcello assisted me with wound vac placement along with pt.'s nurse, Jhonny. No family at bedside. Pt. Trached, on ventilator. Cleaned sacral wound with vashe. Applied NPWT using 1 piece of versatel, 1 black foam on top of wound tracked to another black foam. Seal intact w/ no leakage or blockage detected.   If wound vac does not hold, please continue previous wound care routine order: Sacrum: clean w/ vashe, apply vashe moistened mesalt to wound bed, cover w/ dry gauze, abd pad, and secure w/ tape. BID/PRN if soilage. Will follow up on Thursday for wound vac change w/ wound care NP.     11/04/2024

## 2024-11-04 NOTE — PROGRESS NOTES
[facility] - [facility] ICU  Wound Care  Progress Note    Patient Name: Delio Daniel Jr.  MRN: [id number]  Admission Date: 10/20/2024  Hospital Length of Stay: 15 days  Attending Physician: Itz Francisco MD  Primary Care Provider: Jl Briones MD     Subjective:     HPI:  Wound medicine re-check    The patient is a 69 year old male who presented to [facility] ED on 10/20/24 from [facility] with decreased responsiveness, sepsis, and recurrent UTI. Noted to be hypotensive with Afib and RVR, also requiring mechanical ventilation and admitted to the ICU.   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 Patient is non-verbal at baseline, contracted upper and lower extremities on left side. 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   Blood cultures on admission + Enterococcus faecium - VRE per BCID. Urine culture with Klebsiella and Proteus. ID guiding antibiotic stewardship, currently receiving meropenem and linezolid.     Patient admitted with unstageable pressure ulcer of sacrum; seen by inpatient wound nurse and treatment was iniatated, wound medicine consulted for evaluation and possible debridement.   No family present at time of assessment, all information gained through chart review. In June 2024 appears that patient was seen by wound  for sacral pressure ulcer, no visit notes or images availabe from this time frame. First image of sacral region in May 2024 with wound of sacrum/coccyx. In July 2024 images appears that wound had healed with remaining dark discoloration of sacrum/buttocks and then again noted with wound in images of late August 2024; appears to have evolved and worsened up to this point.  Initial evaluation on 10/22/24 - several pressure ulcers with most  concerning is the sacral/coccyx unstageable ulcer. Also with fecal incontinence which is contaminating the wound as it is veryc close to the anus. He is contracted and we were unable to get full visualization and positioning for bedside debridement. Recommendations made for palliative care consultation  to discuss overall goals of care.   Palliative care consulted and discussion held with family in which they have decided to continue with treatment.   Bedside debridement of sacral ulcer with Surgery on 11/2/24.     11/4//24 - wound re-check today. Met patient in room IN02, he is awake, non-verbal with trach on mechanical ventilation, yellow frothy secretions in trach and tubing, + cough. He is on ICU low air loss bed with bilateral heel boots on, wedge under his left side. Accompanied by ICU nurse as well as inpatient wound nurse.  Difficult to reposition because of contractures. Moves right upper arm freely, grabbing at nurse and bed rails in response to repositioning and wound treatment. No acute distress.   Patient had some hypotension over the weekend in the setting of significant amounts of voluminous watery stool; C-diff negative. Patient currently with rectal tube with large amount of watery brown stool in tubing and bag.   Sputum culture from 11/1 preliminary + Acinetobacter and Pseudomonas species.            Hospital Course:   No notes on file      Follow-up For: Procedure(s) (LRB):  EGD w/ Jtube placement (N/A)    Post-Operative Day: 5 Days Post-Op    Scheduled Meds:   amiodarone  200 mg Per G Tube Daily    atorvastatin  10 mg Per G Tube Daily    digoxin  0.25 mg Per G Tube Daily    linezolid  600 mg Intravenous Q12H    meropenem IV (PEDS and ADULTS)  2 g Intravenous Q8H    metoprolol tartrate  25 mg Per G Tube BID    pantoprazole  40 mg Intravenous Daily    potassium bicarbonate  25 mEq Oral Once    potassium phosphate IVPB  30 mmol Intravenous Once    QUEtiapine  50 mg Per G Tube BID    rivaroxaban  20  mg Per G Tube Nightly     Continuous Infusions:  PRN Meds:  Current Facility-Administered Medications:     acetaminophen, 650 mg, Per G Tube, Q6H PRN    dextromethorphan-guaiFENesin  mg/5 ml, 10 mL, Per G Tube, Q4H PRN    dextrose 10%, 12.5 g, Intravenous, PRN    dextrose 10%, 25 g, Intravenous, PRN    glucagon (human recombinant), 1 mg, Intramuscular, PRN    hydrALAZINE, 10 mg, Intravenous, Q4H PRN    metoclopramide HCl, 10 mg, Oral, Q8H PRN    naloxone, 0.02 mg, Intravenous, PRN    sodium chloride 0.9%, 10 mL, Intravenous, Q12H PRN    Review of Systems   Unable to perform ROS: Patient nonverbal     Objective:     Vital Signs (Most Recent):  Temp: 99.3 °F (37.4 °C) (11/04/24 0400)  Pulse: 84 (11/04/24 0805)  Resp: (!) 28 (11/04/24 0805)  BP: (!) 100/57 (11/04/24 0805)  SpO2: (!) 93 % (11/04/24 0805) Vital Signs (24h Range):  Temp:  [99.3 °F (37.4 °C)-99.8 °F (37.7 °C)] 99.3 °F (37.4 °C)  Pulse:  [] 84  Resp:  [12-29] 28  SpO2:  [77 %-100 %] 93 %  BP: ()/(57-82) 100/57     Weight: 69.1 kg (152 lb 5.4 oz)  Body mass index is 22.49 kg/m².     Physical Exam  Vitals reviewed.   Constitutional:       Comments: Rectal tube     HENT:      Head:      Comments: Right bone flap      Neck:      Comments: Tracheostomy   Pulmonary:      Comments: Mechanical ventilation    Abdominal:      Comments: PEG/J-Tube   Skin:     General: Skin is warm and dry.      Capillary Refill: Capillary refill takes less than 2 seconds.      Findings: Wound present.             Comments:     Left 1st and 2nd toe abrasions    Neurological:      Comments: Left upper and lower extremity contracted  Moves right upper extremity freely and reaches to grab onto nurse,  does not follow command          Sacrum: 6.5 x 7 x 3 cm with undermining 2 cm from 1-5 o'clock and 1 cm from 7-9 o'clock       Left lateral knee: 1.2 x 1 x 0.1 cm   Left medial knee: 0.8 x 0.5 x 0.1 cm       Left lateral ankle: 0.5 x 0.5 x 0.1 cm       Left 1st and 2nd toes               Laboratory:  A1C:   Recent Labs   Lab 08/26/24  1221   HGBA1C 5.3       BMP:   Recent Labs   Lab 11/04/24 0228      K 3.2*   *   CO2 27   BUN 16.2   CREATININE 0.55*   CALCIUM 7.8*       CBC:   Recent Labs   Lab 11/04/24 0228   WBC 7.81   RBC 2.80*   HGB 7.5*   HCT 22.9*      MCV 81.8   MCH 26.8*   MCHC 32.8*     CMP:   Recent Labs   Lab 11/04/24 0228   CALCIUM 7.8*   ALBUMIN 2.0*      K 3.2*   CO2 27   *   BUN 16.2   CREATININE 0.55*   ALKPHOS 104   ALT 11   AST 13   BILITOT 0.6       LFTs:   Recent Labs   Lab 11/04/24 0228   ALT 11   AST 13   ALKPHOS 104   BILITOT 0.6   ALBUMIN 2.0*       Microbiology Results (last 7 days)       Procedure Component Value Units Date/Time    Respiratory Culture [[id number]]  (Abnormal) Collected: 11/01/24 1619    Order Status: Completed Specimen: Respiratory Updated: 11/04/24 1134     Respiratory Culture Many ACINETOBACTER BAUMANNII      Many Pseudomonas aeruginosa     GRAM STAIN Quality 2+      Many Gram Negative Rods      Few Yeast    Clostridium Diff Toxin, A & B, EIA [[id number]]  (Normal) Collected: 11/02/24 1730    Order Status: Completed Specimen: Stool Updated: 11/04/24 0652     Clostridium Difficile GDH Antigen Negative     Clostridium Difficile Toxin A/B Negative              Diagnostic Results:  I have reviewed all pertinent imaging results/findings within the past 24 hours.    Assessment/Plan:     Unstageable pressure ulcer of sacrum - present on admission , bedside debridement per surgery 11/2/214   Stage 3 pressure ulcer of left knee - present on admission  Stage 4 pressure ulcer of left lateral ankle - present on admission  CVA with residual cognitive/motor deficits; contractures   VRE bacteremia  Klebsiella bacteremia  Tracheostomy and PEG tube dependent           PLAN:     Chart reviewed, patient examined and wounds assessed.  Opinion: Wounds re assessed today, s/p bedside debridement with surgery on 11/2/24. Wound  without obvious signs of infection, thin layer of yellow/tan slough covering wound bed. Wound Vac applied by inpatient wound nurse at this time. Vac to be changed and re-check wound on Thursday 11/7/24.   Left knee and ankle wounds without signs of infection. Continue to cleanse with Vashe, apply mupirocin ointment and cover with foam border daily.    Continue to pad left side rail where knee rubs to prevent further breakdown.   Wound care orders: Wound Vac - If it comes off or looses suction remove and resume previous wound care orders - Sacrum - cleanse with Vashe, apply Vashe moistened Mesalt to wound bed, cover with ABD pad and secure with cloth tape, BID and PRN.   Monitor for signs & symptoms of deterioration  Offloading of sacrum/buttocks/heels at all times: YOLETTE mattress, turning q 2 hrs; use of wedges and heel offloading devices to be used at all times while in bed; ( CHATA Zavala). This needs to be reinforced by every staff nurse caring for patient on every shift of every day.  Incontinence: control moisture/wound contamination: No briefs; use things such as purewick or underwood catheter; rectal tube  Nutrition: Follow RD  recommendations for tube feeds; GI following for frequent regurgitation of feeds, J-tube placement w/EGD on 10/30/24.   Will try to follow weekly while admitted, but every nurse assigned to patient on every shift of every day needs to address daily wound care dressing changes and offloading modalities including using heel offloading devices, wedges, YOLETTE mattress etc  Discussed with patient as well as nurse caring for patient today           The time spent including preparing to see the patient, obtaining patient history and assessment, evaluation of the plan of care, patient/caregiver counseling and education, orders, documentation, coordination of care, and other professional medical management activities for today's encounter was 60 minutes                 TRUMAN Romeo  Wound  Care [facility] - [facility] ICU

## 2024-11-04 NOTE — PLAN OF CARE
Problem: Skin Injury Risk Increased  Goal: Skin Health and Integrity  Outcome: Progressing     Problem: Adult Inpatient Plan of Care  Goal: Plan of Care Review  Outcome: Progressing  Goal: Patient-Specific Goal (Individualized)  Outcome: Progressing  Goal: Absence of Hospital-Acquired Illness or Injury  Outcome: Progressing  Goal: Optimal Comfort and Wellbeing  Outcome: Progressing  Goal: Readiness for Transition of Care  Outcome: Progressing     Problem: Infection  Goal: Absence of Infection Signs and Symptoms  Outcome: Progressing     Problem: Sepsis/Septic Shock  Goal: Optimal Coping  Outcome: Progressing  Goal: Absence of Bleeding  Outcome: Progressing  Goal: Blood Glucose Level Within Targeted Range  Outcome: Progressing  Goal: Absence of Infection Signs and Symptoms  Outcome: Progressing  Goal: Optimal Nutrition Intake  Outcome: Progressing     Problem: Pneumonia  Goal: Fluid Balance  Outcome: Progressing  Goal: Resolution of Infection Signs and Symptoms  Outcome: Progressing  Goal: Effective Oxygenation and Ventilation  Outcome: Progressing     Problem: Wound  Goal: Optimal Coping  Outcome: Progressing  Goal: Optimal Functional Ability  Outcome: Progressing  Goal: Absence of Infection Signs and Symptoms  Outcome: Progressing  Goal: Improved Oral Intake  Outcome: Progressing  Goal: Optimal Pain Control and Function  Outcome: Progressing  Goal: Skin Health and Integrity  Outcome: Progressing  Goal: Optimal Wound Healing  Outcome: Progressing

## 2024-11-05 LAB
ABO + RH BLD: NORMAL
ABO + RH BLD: NORMAL
ALBUMIN SERPL-MCNC: 1.8 G/DL (ref 3.4–4.8)
ALBUMIN/GLOB SERPL: 0.6 RATIO (ref 1.1–2)
ALP SERPL-CCNC: 84 UNIT/L (ref 40–150)
ALT SERPL-CCNC: 8 UNIT/L (ref 0–55)
ANION GAP SERPL CALC-SCNC: 9 MEQ/L
AST SERPL-CCNC: 12 UNIT/L (ref 5–34)
BASOPHILS # BLD AUTO: 0.04 X10(3)/MCL
BASOPHILS NFR BLD AUTO: 0.5 %
BILIRUB SERPL-MCNC: 0.8 MG/DL
BLD PROD TYP BPU: NORMAL
BLD PROD TYP BPU: NORMAL
BLOOD UNIT EXPIRATION DATE: NORMAL
BLOOD UNIT EXPIRATION DATE: NORMAL
BLOOD UNIT TYPE CODE: 6200
BLOOD UNIT TYPE CODE: 6200
BUN SERPL-MCNC: 18.3 MG/DL (ref 8.4–25.7)
CALCIUM SERPL-MCNC: 7.1 MG/DL (ref 8.8–10)
CHLORIDE SERPL-SCNC: 107 MMOL/L (ref 98–107)
CO2 SERPL-SCNC: 26 MMOL/L (ref 23–31)
CREAT SERPL-MCNC: 0.56 MG/DL (ref 0.72–1.25)
CREAT/UREA NIT SERPL: 33
CROSSMATCH INTERPRETATION: NORMAL
CROSSMATCH INTERPRETATION: NORMAL
DISPENSE STATUS: NORMAL
DISPENSE STATUS: NORMAL
EOSINOPHIL # BLD AUTO: 0.1 X10(3)/MCL (ref 0–0.9)
EOSINOPHIL NFR BLD AUTO: 1.2 %
ERYTHROCYTE [DISTWIDTH] IN BLOOD BY AUTOMATED COUNT: 19.3 % (ref 11.5–17)
FERRITIN SERPL-MCNC: 466.52 NG/ML (ref 21.81–274.66)
FOLATE SERPL-MCNC: 3.4 NG/ML (ref 7–31.4)
GFR SERPLBLD CREATININE-BSD FMLA CKD-EPI: >60 ML/MIN/1.73/M2
GLOBULIN SER-MCNC: 3 GM/DL (ref 2.4–3.5)
GLUCOSE SERPL-MCNC: 102 MG/DL (ref 82–115)
GROUP & RH: NORMAL
HAPTOGLOB SERPL-MCNC: 259 MG/DL (ref 40–368)
HCT VFR BLD AUTO: 18.8 % (ref 42–52)
HGB BLD-MCNC: 6.1 G/DL (ref 14–18)
IMM GRANULOCYTES # BLD AUTO: 0.06 X10(3)/MCL (ref 0–0.04)
IMM GRANULOCYTES NFR BLD AUTO: 0.7 %
INDIRECT COOMBS: NORMAL
IRON SATN MFR SERPL: 12 % (ref 20–50)
IRON SERPL-MCNC: 16 UG/DL (ref 65–175)
LDH SERPL-CCNC: 239 U/L (ref 125–220)
LYMPHOCYTES # BLD AUTO: 1.77 X10(3)/MCL (ref 0.6–4.6)
LYMPHOCYTES NFR BLD AUTO: 20.4 %
MCH RBC QN AUTO: 26.6 PG (ref 27–31)
MCHC RBC AUTO-ENTMCNC: 32.4 G/DL (ref 33–36)
MCV RBC AUTO: 82.1 FL (ref 80–94)
MONOCYTES # BLD AUTO: 0.61 X10(3)/MCL (ref 0.1–1.3)
MONOCYTES NFR BLD AUTO: 7 %
NEUTROPHILS # BLD AUTO: 6.09 X10(3)/MCL (ref 2.1–9.2)
NEUTROPHILS NFR BLD AUTO: 70.2 %
NRBC BLD AUTO-RTO: 0.5 %
OHS QRS DURATION: 82 MS
OHS QTC CALCULATION: 516 MS
PLATELET # BLD AUTO: 179 X10(3)/MCL (ref 130–400)
PMV BLD AUTO: 10.2 FL (ref 7.4–10.4)
POCT GLUCOSE: 114 MG/DL (ref 70–110)
POTASSIUM SERPL-SCNC: 3.1 MMOL/L (ref 3.5–5.1)
PROT SERPL-MCNC: 4.8 GM/DL (ref 5.8–7.6)
RBC # BLD AUTO: 2.29 X10(6)/MCL (ref 4.7–6.1)
SODIUM SERPL-SCNC: 142 MMOL/L (ref 136–145)
SPECIMEN OUTDATE: NORMAL
TIBC SERPL-MCNC: 123 UG/DL (ref 69–240)
TIBC SERPL-MCNC: 139 UG/DL (ref 250–450)
TRANSFERRIN SERPL-MCNC: 141 MG/DL (ref 163–344)
UNIT NUMBER: NORMAL
UNIT NUMBER: NORMAL
VIT B12 SERPL-MCNC: 242 PG/ML (ref 213–816)
WBC # BLD AUTO: 8.67 X10(3)/MCL (ref 4.5–11.5)

## 2024-11-05 PROCEDURE — 25000003 PHARM REV CODE 250: Performed by: GENERAL PRACTICE

## 2024-11-05 PROCEDURE — 99900035 HC TECH TIME PER 15 MIN (STAT)

## 2024-11-05 PROCEDURE — 20000000 HC ICU ROOM

## 2024-11-05 PROCEDURE — 27100171 HC OXYGEN HIGH FLOW UP TO 24 HOURS

## 2024-11-05 PROCEDURE — 82728 ASSAY OF FERRITIN: CPT

## 2024-11-05 PROCEDURE — 83550 IRON BINDING TEST: CPT

## 2024-11-05 PROCEDURE — 63600175 PHARM REV CODE 636 W HCPCS

## 2024-11-05 PROCEDURE — 63600175 PHARM REV CODE 636 W HCPCS: Performed by: GENERAL PRACTICE

## 2024-11-05 PROCEDURE — 94761 N-INVAS EAR/PLS OXIMETRY MLT: CPT

## 2024-11-05 PROCEDURE — 36415 COLL VENOUS BLD VENIPUNCTURE: CPT

## 2024-11-05 PROCEDURE — 80053 COMPREHEN METABOLIC PANEL: CPT

## 2024-11-05 PROCEDURE — 25000003 PHARM REV CODE 250

## 2024-11-05 PROCEDURE — 94760 N-INVAS EAR/PLS OXIMETRY 1: CPT

## 2024-11-05 PROCEDURE — 85025 COMPLETE CBC W/AUTO DIFF WBC: CPT

## 2024-11-05 PROCEDURE — 86901 BLOOD TYPING SEROLOGIC RH(D): CPT

## 2024-11-05 PROCEDURE — 82607 VITAMIN B-12: CPT

## 2024-11-05 PROCEDURE — P9016 RBC LEUKOCYTES REDUCED: HCPCS

## 2024-11-05 PROCEDURE — 93010 ELECTROCARDIOGRAM REPORT: CPT | Mod: ,,, | Performed by: INTERNAL MEDICINE

## 2024-11-05 PROCEDURE — 27200966 HC CLOSED SUCTION SYSTEM

## 2024-11-05 PROCEDURE — 86923 COMPATIBILITY TEST ELECTRIC: CPT

## 2024-11-05 PROCEDURE — 99900026 HC AIRWAY MAINTENANCE (STAT)

## 2024-11-05 PROCEDURE — 36430 TRANSFUSION BLD/BLD COMPNT: CPT

## 2024-11-05 PROCEDURE — 99900022

## 2024-11-05 PROCEDURE — 82746 ASSAY OF FOLIC ACID SERUM: CPT

## 2024-11-05 PROCEDURE — 25000003 PHARM REV CODE 250: Performed by: NURSE PRACTITIONER

## 2024-11-05 PROCEDURE — 83010 ASSAY OF HAPTOGLOBIN QUANT: CPT

## 2024-11-05 PROCEDURE — 83615 LACTATE (LD) (LDH) ENZYME: CPT

## 2024-11-05 PROCEDURE — 93005 ELECTROCARDIOGRAM TRACING: CPT

## 2024-11-05 PROCEDURE — 94003 VENT MGMT INPAT SUBQ DAY: CPT

## 2024-11-05 PROCEDURE — 99900031 HC PATIENT EDUCATION (STAT)

## 2024-11-05 RX ORDER — POTASSIUM CHLORIDE 14.9 MG/ML
20 INJECTION INTRAVENOUS
Status: COMPLETED | OUTPATIENT
Start: 2024-11-05 | End: 2024-11-05

## 2024-11-05 RX ORDER — HYDROCODONE BITARTRATE AND ACETAMINOPHEN 500; 5 MG/1; MG/1
TABLET ORAL
Status: DISCONTINUED | OUTPATIENT
Start: 2024-11-05 | End: 2024-11-18 | Stop reason: HOSPADM

## 2024-11-05 RX ORDER — MAGNESIUM SULFATE HEPTAHYDRATE 40 MG/ML
2 INJECTION, SOLUTION INTRAVENOUS ONCE
Status: CANCELLED | OUTPATIENT
Start: 2024-11-05 | End: 2024-11-05

## 2024-11-05 RX ADMIN — LINEZOLID 600 MG: 600 INJECTION, SOLUTION INTRAVENOUS at 08:11

## 2024-11-05 RX ADMIN — RIVAROXABAN 20 MG: 10 TABLET, FILM COATED ORAL at 08:11

## 2024-11-05 RX ADMIN — DIGOXIN 0.25 MG: 125 TABLET ORAL at 09:11

## 2024-11-05 RX ADMIN — AMIODARONE HYDROCHLORIDE 200 MG: 200 TABLET ORAL at 09:11

## 2024-11-05 RX ADMIN — SODIUM CHLORIDE 2 G: 9 INJECTION, SOLUTION INTRAVENOUS at 06:11

## 2024-11-05 RX ADMIN — POTASSIUM CHLORIDE 20 MEQ: 14.9 INJECTION INTRAVENOUS at 10:11

## 2024-11-05 RX ADMIN — ATORVASTATIN CALCIUM 10 MG: 10 TABLET, FILM COATED ORAL at 09:11

## 2024-11-05 RX ADMIN — PANTOPRAZOLE SODIUM 40 MG: 40 INJECTION, POWDER, LYOPHILIZED, FOR SOLUTION INTRAVENOUS at 09:11

## 2024-11-05 RX ADMIN — LINEZOLID 600 MG: 600 INJECTION, SOLUTION INTRAVENOUS at 10:11

## 2024-11-05 RX ADMIN — METOPROLOL TARTRATE 25 MG: 25 TABLET, FILM COATED ORAL at 09:11

## 2024-11-05 RX ADMIN — POTASSIUM CHLORIDE 20 MEQ: 14.9 INJECTION INTRAVENOUS at 08:11

## 2024-11-05 RX ADMIN — POTASSIUM CHLORIDE 20 MEQ: 14.9 INJECTION INTRAVENOUS at 06:11

## 2024-11-05 RX ADMIN — SODIUM CHLORIDE 2 G: 9 INJECTION, SOLUTION INTRAVENOUS at 10:11

## 2024-11-05 RX ADMIN — QUETIAPINE FUMARATE 50 MG: 25 TABLET ORAL at 09:11

## 2024-11-05 RX ADMIN — QUETIAPINE FUMARATE 50 MG: 25 TABLET ORAL at 08:11

## 2024-11-05 NOTE — PROGRESS NOTES
Pulmonary & Critical Care Medicine   Progress Note      Presenting History/HPI:    Patient is a 68 y/o male with extensive PMH who presented from NH on 10/20/24 with decreased responsiveness, severe sepsis, and recurrent UTI. 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      Patient transferred to ED from [facility] with lethargy and tachycardia. Family at bedside reports patient is nonverbal at baseline but typically more alert. In the ED, patient is febrile, tachycardic with -190s, tachypneic, /60. EKG shows Afib with RVR. Diltiazem drip started in ED. Labs are significant for WBC of 16.5, lactic acid of 3.3, Cr 1.48, BUN 45.3, Na 155, K+ 5.1, troponin elevated at 0.118. UA with evidence of UTI. Of note, pt was being treated outpatient for a UTI, currently on day 4 of 7 day Rocephin course. Urine culture 10/16/24 with multidrug resistant Klebsiella pneumonia and Proteus mirabilis with susceptibility to Cefepime. Patient received 3L of NS and initiated on Vanc and Cefepime in ED. Admitted to the ICU on mechanical ventilation via trach.    Admitted to the ICU on 10/20   Peg tube extension to J-tube on 10/30    Interval History:  -still in AFib with RVR  -hemoglobin down to 6.1, potassium 3.1,   -sputum culture positive for Acinetobacter sensitive only to gentamicin and tobramycin with Pseudomonas only sensitive to ciprofloxacin gentamicin and tobramycin  -T-max of 100° over the past 24 hours    Scheduled Medications:    amiodarone  200 mg Per G Tube Daily    atorvastatin  10 mg Per G Tube Daily    digoxin  0.25 mg Per G Tube Daily    linezolid  600 mg Intravenous Q12H    meropenem IV (PEDS  and ADULTS)  2 g Intravenous Q8H    metoprolol tartrate  25 mg Per G Tube BID    pantoprazole  40 mg Intravenous Daily    QUEtiapine  50 mg Per G Tube BID    rivaroxaban  20 mg Per G Tube Nightly       PRN Medications:     Current Facility-Administered Medications:     0.9%  NaCl infusion (for blood administration), , Intravenous, Q24H PRN    acetaminophen, 650 mg, Per G Tube, Q6H PRN    dextromethorphan-guaiFENesin  mg/5 ml, 10 mL, Per G Tube, Q4H PRN    dextrose 10%, 12.5 g, Intravenous, PRN    dextrose 10%, 25 g, Intravenous, PRN    glucagon (human recombinant), 1 mg, Intramuscular, PRN    hydrALAZINE, 10 mg, Intravenous, Q4H PRN    metoclopramide HCl, 10 mg, Oral, Q8H PRN    naloxone, 0.02 mg, Intravenous, PRN    sodium chloride 0.9%, 10 mL, Intravenous, Q12H PRN      Infusions:          Fluid Balance:     Intake/Output Summary (Last 24 hours) at 11/5/2024 1731  Last data filed at 11/5/2024 1200  Gross per 24 hour   Intake 1413.35 ml   Output 1525 ml   Net -111.65 ml         Vital Signs:   Vitals:    11/05/24 1710   BP:    Pulse:    Resp: 20   Temp:          Physical Exam  Vitals and nursing note reviewed.   Constitutional:       Appearance: He is ill-appearing.   HENT:      Head: Normocephalic.      Right Ear: External ear normal.      Left Ear: External ear normal.      Nose: Nose normal.      Mouth/Throat:      Mouth: Mucous membranes are moist.      Pharynx: Oropharynx is clear. No oropharyngeal exudate or posterior oropharyngeal erythema.   Eyes:      General: No scleral icterus.     Extraocular Movements: Extraocular movements intact.      Conjunctiva/sclera: Conjunctivae normal.      Pupils: Pupils are equal, round, and reactive to light.   Neck:      Comments: Tracheostomy in place, site clean and dry  Cardiovascular:      Rate and Rhythm: Normal rate and regular rhythm.      Pulses: Normal pulses.      Heart sounds: Normal heart sounds. No murmur heard.     No friction rub. No gallop.   Pulmonary:  "     Effort: Pulmonary effort is normal. No respiratory distress.      Breath sounds: Normal breath sounds. No stridor. No wheezing, rhonchi or rales.      Comments: On mechanical ventilation via tracheostomy tube, no dyssynchrony seen on mechanical ventilation, no accessory muscle use   Chest:      Chest wall: No tenderness.   Abdominal:      General: Abdomen is flat. Bowel sounds are normal. There is no distension.      Palpations: Abdomen is soft.      Tenderness: There is no abdominal tenderness. There is no rebound.      Comments: J-tube in place site clean and dry   Musculoskeletal:      Cervical back: Normal range of motion. No rigidity or tenderness.   Skin:     General: Skin is warm and dry.      Capillary Refill: Capillary refill takes less than 2 seconds.      Coloration: Skin is not jaundiced.      Findings: No bruising, erythema or rash.   Neurological:      Mental Status: Mental status is at baseline.      Comments: Nonverbal, opens eyes to loud name call, not moving any extremities randomly or on command   Psychiatric:      Comments: Unable to fully assess           Ventilator Settings  Vent Mode: A/C (11/05/24 1710)  Ventilator Initiated: Yes (10/20/24 1546)  Set Rate: 20 BPM (11/05/24 1710)  Vt Set: 500 mL (11/05/24 1710)  PEEP/CPAP: 5 cmH20 (11/05/24 1710)  Oxygen Concentration (%): 40 (11/05/24 1710)  Peak Airway Pressure: 29 cmH20 (11/05/24 1710)  Plateau Pressure: 3 cmH20 (11/01/24 0338)  Total Ve: 7.8 L/m (11/05/24 1710)  F/VT Ratio<105 (RSBI): (!) 47.17 (11/05/24 1710)      Laboratory Studies:   No results for input(s): "PH", "PCO2", "PO2", "HCO3", "POCSATURATED", "BE" in the last 24 hours.  Recent Labs   Lab 11/05/24 0451   WBC 8.67   RBC 2.29*   HGB 6.1*   HCT 18.8*      MCV 82.1   MCH 26.6*   MCHC 32.4*     Recent Labs   Lab 11/05/24 0451   GLUCOSE 102      K 3.1*      CO2 26   BUN 18.3   CREATININE 0.56*   CALCIUM 7.1*         Microbiology Data:   Microbiology Results " (last 7 days)       Procedure Component Value Units Date/Time    Respiratory Culture [[id number]]  (Abnormal)  (Susceptibility) Collected: 11/01/24 1619    Order Status: Completed Specimen: Respiratory Updated: 11/05/24 1152     Respiratory Culture Many ACINETOBACTER BAUMANNII      Many Pseudomonas aeruginosa     GRAM STAIN Quality 2+      Many Gram Negative Rods      Few Yeast    Clostridium Diff Toxin, A & B, EIA [[id number]]  (Normal) Collected: 11/02/24 1730    Order Status: Completed Specimen: Stool Updated: 11/04/24 0652     Clostridium Difficile GDH Antigen Negative     Clostridium Difficile Toxin A/B Negative              Imaging:   CT Head Without Contrast  Narrative: EXAMINATION:  CT HEAD WITHOUT CONTRAST    CLINICAL HISTORY:  Mental status change, unknown cause;    TECHNIQUE:  Low dose axial images were obtained through the head.  Coronal and sagittal reformations were also performed. Contrast was not administered.    Automatic exposure control was utilized to reduce the patient's radiation dose.    DLP= 1377    COMPARISON:  07/23/2024    FINDINGS:  No acute intracranial hemorrhage, edema or mass. No acute parenchymal abnormality.    Diffuse encephalomalacia of the right cerebral hemisphere appears grossly unchanged.    Ventriculomegaly is unchanged.    Postsurgical changes of right craniotomy is unchanged.    The mastoid air cells are clear.    The auditory canals are patent bilaterally.    The globes and orbital contents are normal bilaterally.    The visualized maxillary, ethmoid and sphenoid sinuses are clear.  Impression: No acute intracranial abnormality identified.  Extensive remote posttraumatic and postsurgical change noted.  This appears unchanged in the interval.    Electronically signed by: Jg Plunkett  Date:    11/03/2024  Time:    10:25          Assessment and Plan    Assessment:  Sepsis without shock with underlying VRE Enterococcus and ESBL Klebsiella bacteremia with Klebsiella and  Proteus pneumonia on 10/20/10/24  -now with Acinetobacter and Pseudomonas positive sputum culture on 11/01/2011/5 with Acinetobacter sensitive to gentamicin and tobramycin and Pseudomonas sensitive to ciprofloxacin gentamicin and tobramycin, suspect colonization without leukocytosis or increased sputum production or worsening hypoxia   -chronic hypoxemic respiratory failure with tracheostomy on permanent mechanical ventilatory support secondary to prior cerebrovascular accident with subsequent hemorrhagic stroke status post craniectomy   -AFib with RVR   -chronic sacral decubitus ulcer present on admission  -peg tube feed intolerance status post J-tube extension with tolerance of tube feeds  -altered mental status due to the above intracranial process    Plan:  -titrate mechanical ventilation for ARDS net protocol   -supplement oxygen to maintain saturation 94-96%   -routine tracheostomy care   -tube feeds to goal with free water flushes as appropriate   -in AFib on digoxin Lopressor and amiodarone still ongoing, nothing further to do for AFib   -currently on Merrem venom and linezolid until 11/6 and 11/5 respectively  -now with the above new cultures from sputum although he has no worsening hypoxia no leukocytosis no persistent fever with suspicion this is colonization versus active infection, will hold off on starting further antibiotics   -wound care for sacral decubitus ulcer debrided at bedside on 11/02 with wound VAC placement on 11/04, appreciate assistance from Wound Care and from General surgery    Overall prognosis remains very poor    DVT ppx/tx with Xarelto  GI ppx with protonix  Keep HOB elevated > 30*      Itz Francisco MD  11/5/2024  Pulmonology/Critical Care

## 2024-11-05 NOTE — PROGRESS NOTES
Inpatient Nutrition Assessment    Admit Date: 10/20/2024   Total duration of encounter: 16 days   Patient Age: 69 y.o.    Nutrition Recommendation/Prescription     Tube feeding recommendation:     Impact Peptide 1.5 goal rate 70 ml/hr to provide  2100 kcal/d  (111% est needs)  131 g protein/d (100% est needs)  1078 ml free water/d (52% est needs)  (calculations based on estimated 20 hr/d run time)     If no IV fluids running, can give 100ml q 2hr water flushes. Total water provided: 2078ml (100% est needs.)     Patel (provides 90 kcal, 2.5 g protein per serving) BID.    Consider per MD:  MVI 1 po daily  Vit C 500mg BID  Vit A 10,000 IU daily  Zinc sulfate 220mg Daily     Communication of Recommendations: reviewed with nurse    Nutrition Assessment     Malnutrition Assessment/Nutrition-Focused Physical Exam    Malnutrition Context: chronic illness (10/21/24 1028)  Malnutrition Level: severe (10/21/24 1028)  Energy Intake (Malnutrition):  (unable to eval) (10/21/24 1028)  Weight Loss (Malnutrition): greater than 10% in 6 months (10/21/24 1028)  Subcutaneous Fat (Malnutrition): severe depletion (10/21/24 1028)  Orbital Region (Subcutaneous Fat Loss): severe depletion  Upper Arm Region (Subcutaneous Fat Loss): severe depletion     Muscle Mass (Malnutrition): severe depletion (10/21/24 1028)     Clavicle Bone Region (Muscle Loss): severe depletion  Clavicle and Acromion Bone Region (Muscle Loss): severe depletion  Scapular Bone Region (Muscle Loss): severe depletion              Fluid Accumulation (Malnutrition):  (does not meet criteria) (10/21/24 1028)        A minimum of two characteristics is recommended for diagnosis of either severe or non-severe malnutrition.    Chart Review    Reason Seen: continuous nutrition monitoring and physician consult for TF    Malnutrition Screening Tool Results   Have you recently lost weight without trying?: Unsure  Have you been eating poorly because of a decreased appetite?: No   MST  Score: 2   Diagnosis:  Severe sepsis   UTI  Afib with RVR  Hypernatremia   Sacral wound    Relevant Medical History:    Arthritis      Atrial fibrillation      BPH (benign prostatic hyperplasia)      Cardiac arrest      Coronary artery disease      Cyst, kidney, acquired      Diverticulosis      Hyperlipidemia      Hypertension      MI (myocardial infarction)      Obesity      Steatosis of liver      Stroke      Scheduled Medications:  amiodarone, 200 mg, Daily  atorvastatin, 10 mg, Daily  digoxin, 0.25 mg, Daily  linezolid, 600 mg, Q12H  meropenem IV (PEDS and ADULTS), 2 g, Q8H  metoprolol tartrate, 25 mg, BID  pantoprazole, 40 mg, Daily  potassium chloride in water, 20 mEq, Q2H  QUEtiapine, 50 mg, BID  rivaroxaban, 20 mg, Nightly    Continuous Infusions:       PRN Medications:  0.9%  NaCl infusion (for blood administration), , Q24H PRN  acetaminophen, 650 mg, Q6H PRN  dextromethorphan-guaiFENesin  mg/5 ml, 10 mL, Q4H PRN  dextrose 10%, 12.5 g, PRN  dextrose 10%, 25 g, PRN  glucagon (human recombinant), 1 mg, PRN  hydrALAZINE, 10 mg, Q4H PRN  metoclopramide HCl, 10 mg, Q8H PRN  naloxone, 0.02 mg, PRN  sodium chloride 0.9%, 10 mL, Q12H PRN    Calorie Containing IV Medications: no significant kcals from medications at this time    Recent Labs   Lab 10/30/24  0807 10/31/24  0318 10/31/24  0607 11/03/24  0348 11/04/24  0228 11/05/24  0451     --  140 143 144 142   K 3.5  --  3.7 2.8* 3.2* 3.1*   CALCIUM 8.2*  --  8.1* 7.7* 7.8* 7.1*   PHOS  --   --   --   --  <0.7*  --    *  --  106 108* 108* 107   CO2 20*  --  20* 28 27 26   BUN 5.8*  --  6.6* 18.2 16.2 18.3   CREATININE 0.58*  --  0.66* 0.56* 0.55* 0.56*   EGFRNORACEVR >60  --  >60 >60 >60 >60   GLUCOSE 42*  --  49* 98 103 102   BILITOT 0.9  --  0.8 0.5 0.6 0.8   ALKPHOS 120  --  123 106 104 84   ALT 10  --  10 9 11 8   AST 18  --  17 12 13 12   ALBUMIN 2.3*  --  2.2* 2.0* 2.0* 1.8*   WBC 8.45 10.40  --  9.29 7.81 8.67   HGB 9.4* 8.8*  --  7.7* 7.5*  "6.1*   HCT 29.5* 29.7*  --  24.3* 22.9* 18.8*     Nutrition Orders:  Diet NPO  Tube Feedings/Formulas 70; 1,400; Impact Peptide 1.5; Peg; 100; Every 2 hours,Tube Feedings/Formulas Other (see comments); Peg; Patel - Orange    Appetite/Oral Intake: not applicable/not applicable  Factors Affecting Nutritional Intake: on mechanical ventilation and tracheostomy  Social Needs Impacting Access to Food: none identified (from NH)  Food/Temple/Cultural Preferences: unable to obtain  Food Allergies: no known food allergies  Last Bowel Movement: 11/04/24  Wound(s):[REMOVED]      Altered Skin Integrity 02/26/24 1100 lower Buttocks Moisture associated dermatitis-Tissue loss description: Full thickness       Wound 08/19/24 1500 Pressure Injury Left anterior Leg-Tissue loss description: Partial thickness       Wound 08/05/24 1420 Pressure Injury Left Knee-Tissue loss description: Partial thickness       Wound 10/20/24 2100 Pressure Injury Sacral spine-Tissue loss description: Full thickness       Wound 08/19/24 1500 Pressure Injury Left lateral Ankle-Tissue loss description: Partial thickness     Comments    10/21/24: Pt with previous EMR wts indicating wt loss. On NH TF. Also with increased protein needs due to wounds.     10/25/24: TF on hold. Pt continues with vomiting. On Reglan.     10/29/24: TF still off. Plans for gtube extension into jejunum.     11/1/24: TF @ 55ml/hr, plans to advance per RN. Pt now with G-J tube placement. Tolerated so far per RN.     11/5/24: TF continues. Tolerated per RN. Noted recent labs, may need to consider adding additional vitamin and minerals.     Anthropometrics    Height: 5' 9.02" (175.3 cm), Height Method: Estimated  Last Weight: 69.1 kg (152 lb 5.4 oz) (10/21/24 1026), Weight Method: Bed Scale  BMI (Calculated): 22.5  BMI Classification: normal (BMI 18.5-24.9)        Ideal Body Weight (IBW), Male: 160.12 lb     % Ideal Body Weight, Male (lb): 95.21 %                 Usual Body Weight " (UBW), k kg (per EMR from 24)  % Usual Body Weight: 76.94  % Weight Change From Usual Weight: -23.22 %  Usual Weight Provided By: EMR weight history    Wt Readings from Last 5 Encounters:   10/21/24 69.1 kg (152 lb 5.4 oz)   24 90.7 kg (199 lb 15.3 oz)   24 117.9 kg (260 lb)   24 117.9 kg (260 lb)   24 90.7 kg (200 lb)     Weight Change(s) Since Admission:   Wt Readings from Last 1 Encounters:   10/21/24 1026 69.1 kg (152 lb 5.4 oz)   10/21/24 0634 69.1 kg (152 lb 5.4 oz)   10/20/24 1535 63.5 kg (140 lb)   Admit Weight: 63.5 kg (140 lb) (10/20/24 1535), Weight Method: Estimated    Estimated Needs    Weight Used For Calorie Calculations: 69.1 kg (152 lb 5.4 oz)  Energy Calorie Requirements (kcal): 1886kcal (1.3 stress factor)  Energy Need Method: [place]-St Jeor  Weight Used For Protein Calculations: 69.1 kg (152 lb 5.4 oz)  Protein Requirements: 124-138gm (1.8-2g/kg)  Fluid Requirements (mL): 2073ml (30ml/kg)  CHO Requirement: 210gm (45% est kcal needs)     Enteral Nutrition     Formula: Impact Peptide 1.5 [place]  Rate/Volume: 70ml/hr  Water Flushes: 100ml q2hr  Additives/Modulars: Patel  Route: PEG/J  Method: continuous  Total Nutrition Provided by Tube Feeding, Additives, and Flushes:  Calories Provided  2100 kcal/d, 111% needs   Protein Provided  131 g/d, 100% needs   Fluid Provided  2078 ml/d, 100% needs   Continuous feeding calculations based on estimated 20 hr/d run time unless otherwise stated.    Parenteral Nutrition     Patient not receiving parenteral nutrition support at this time.    Evaluation of Received Nutrient Intake    Calories: meeting estimated needs  Protein: meeting estimated needs    Patient Education     Not applicable.    Nutrition Diagnosis     PES: Inadequate oral intake related to chronic illness as evidenced by trach/G-J tube. (active)     PES: Severe chronic disease or condition related malnutrition related to chronic illness as evidenced by severe fat  depletion, severe muscle depletion, and greater than 10% weight loss in 6 months. (active)    Nutrition Interventions     Intervention(s): collaboration with other providers    Goal: Meet greater than 80% of nutritional needs by follow-up. (goal progressing)  Goal: Tolerate enteral feeding at goal rate by follow-up. (goal progressing)    Nutrition Goals & Monitoring     Dietitian will monitor: energy intake and enteral nutrition intake  Discharge planning: too early to determine; pending clinical course  Nutrition Risk/Follow-Up: high (follow-up in 1-4 days)   Please consult if re-assessment needed sooner.

## 2024-11-06 LAB
ALBUMIN SERPL-MCNC: 1.8 G/DL (ref 3.4–4.8)
ALBUMIN/GLOB SERPL: 0.5 RATIO (ref 1.1–2)
ALP SERPL-CCNC: 91 UNIT/L (ref 40–150)
ALT SERPL-CCNC: 11 UNIT/L (ref 0–55)
ANION GAP SERPL CALC-SCNC: 8 MEQ/L
AST SERPL-CCNC: 15 UNIT/L (ref 5–34)
BASOPHILS # BLD AUTO: 0.05 X10(3)/MCL
BASOPHILS NFR BLD AUTO: 0.4 %
BILIRUB SERPL-MCNC: 1.5 MG/DL
BUN SERPL-MCNC: 18.2 MG/DL (ref 8.4–25.7)
CALCIUM SERPL-MCNC: 7.9 MG/DL (ref 8.8–10)
CHLORIDE SERPL-SCNC: 108 MMOL/L (ref 98–107)
CO2 SERPL-SCNC: 26 MMOL/L (ref 23–31)
CREAT SERPL-MCNC: 0.54 MG/DL (ref 0.72–1.25)
CREAT/UREA NIT SERPL: 34
EOSINOPHIL # BLD AUTO: 0.16 X10(3)/MCL (ref 0–0.9)
EOSINOPHIL NFR BLD AUTO: 1.2 %
ERYTHROCYTE [DISTWIDTH] IN BLOOD BY AUTOMATED COUNT: 18.1 % (ref 11.5–17)
GFR SERPLBLD CREATININE-BSD FMLA CKD-EPI: >60 ML/MIN/1.73/M2
GLOBULIN SER-MCNC: 4 GM/DL (ref 2.4–3.5)
GLUCOSE SERPL-MCNC: 93 MG/DL (ref 82–115)
HCT VFR BLD AUTO: 29.9 % (ref 42–52)
HGB BLD-MCNC: 10.1 G/DL (ref 14–18)
IMM GRANULOCYTES # BLD AUTO: 0.08 X10(3)/MCL (ref 0–0.04)
IMM GRANULOCYTES NFR BLD AUTO: 0.6 %
LYMPHOCYTES # BLD AUTO: 1.78 X10(3)/MCL (ref 0.6–4.6)
LYMPHOCYTES NFR BLD AUTO: 13.9 %
MCH RBC QN AUTO: 28.5 PG (ref 27–31)
MCHC RBC AUTO-ENTMCNC: 33.8 G/DL (ref 33–36)
MCV RBC AUTO: 84.5 FL (ref 80–94)
MONOCYTES # BLD AUTO: 1.15 X10(3)/MCL (ref 0.1–1.3)
MONOCYTES NFR BLD AUTO: 9 %
NEUTROPHILS # BLD AUTO: 9.62 X10(3)/MCL (ref 2.1–9.2)
NEUTROPHILS NFR BLD AUTO: 74.9 %
NRBC BLD AUTO-RTO: 0.4 %
PLATELET # BLD AUTO: 179 X10(3)/MCL (ref 130–400)
PMV BLD AUTO: 10.2 FL (ref 7.4–10.4)
POCT GLUCOSE: 103 MG/DL (ref 70–110)
POCT GLUCOSE: 109 MG/DL (ref 70–110)
POTASSIUM SERPL-SCNC: 3.5 MMOL/L (ref 3.5–5.1)
PROT SERPL-MCNC: 5.8 GM/DL (ref 5.8–7.6)
RBC # BLD AUTO: 3.54 X10(6)/MCL (ref 4.7–6.1)
SODIUM SERPL-SCNC: 142 MMOL/L (ref 136–145)
WBC # BLD AUTO: 12.84 X10(3)/MCL (ref 4.5–11.5)

## 2024-11-06 PROCEDURE — 25000003 PHARM REV CODE 250

## 2024-11-06 PROCEDURE — 94003 VENT MGMT INPAT SUBQ DAY: CPT

## 2024-11-06 PROCEDURE — 25000003 PHARM REV CODE 250: Performed by: NURSE PRACTITIONER

## 2024-11-06 PROCEDURE — 94760 N-INVAS EAR/PLS OXIMETRY 1: CPT

## 2024-11-06 PROCEDURE — 20000000 HC ICU ROOM

## 2024-11-06 PROCEDURE — 63600175 PHARM REV CODE 636 W HCPCS

## 2024-11-06 PROCEDURE — 25000003 PHARM REV CODE 250: Performed by: GENERAL PRACTICE

## 2024-11-06 PROCEDURE — 80053 COMPREHEN METABOLIC PANEL: CPT

## 2024-11-06 PROCEDURE — 99900031 HC PATIENT EDUCATION (STAT)

## 2024-11-06 PROCEDURE — 27100171 HC OXYGEN HIGH FLOW UP TO 24 HOURS

## 2024-11-06 PROCEDURE — 99900026 HC AIRWAY MAINTENANCE (STAT)

## 2024-11-06 PROCEDURE — 36415 COLL VENOUS BLD VENIPUNCTURE: CPT

## 2024-11-06 PROCEDURE — 99900035 HC TECH TIME PER 15 MIN (STAT)

## 2024-11-06 PROCEDURE — 63600175 PHARM REV CODE 636 W HCPCS: Performed by: GENERAL PRACTICE

## 2024-11-06 PROCEDURE — 85025 COMPLETE CBC W/AUTO DIFF WBC: CPT

## 2024-11-06 PROCEDURE — 27200966 HC CLOSED SUCTION SYSTEM

## 2024-11-06 PROCEDURE — 27000207 HC ISOLATION

## 2024-11-06 RX ADMIN — QUETIAPINE FUMARATE 50 MG: 25 TABLET ORAL at 08:11

## 2024-11-06 RX ADMIN — ATORVASTATIN CALCIUM 10 MG: 10 TABLET, FILM COATED ORAL at 08:11

## 2024-11-06 RX ADMIN — RIVAROXABAN 20 MG: 10 TABLET, FILM COATED ORAL at 08:11

## 2024-11-06 RX ADMIN — PANTOPRAZOLE SODIUM 40 MG: 40 INJECTION, POWDER, LYOPHILIZED, FOR SOLUTION INTRAVENOUS at 08:11

## 2024-11-06 RX ADMIN — METOPROLOL TARTRATE 25 MG: 25 TABLET, FILM COATED ORAL at 08:11

## 2024-11-06 RX ADMIN — DIGOXIN 0.25 MG: 125 TABLET ORAL at 08:11

## 2024-11-06 RX ADMIN — SODIUM CHLORIDE 2 G: 9 INJECTION, SOLUTION INTRAVENOUS at 06:11

## 2024-11-06 RX ADMIN — AMIODARONE HYDROCHLORIDE 200 MG: 200 TABLET ORAL at 08:11

## 2024-11-06 NOTE — PROGRESS NOTES
Pulmonary & Critical Care Medicine   Progress Note      Presenting History/HPI:    Patient is a 68 y/o male with extensive PMH who presented from NH on 10/20/24 with decreased responsiveness, severe sepsis, and recurrent UTI. 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      Patient transferred to ED from [facility] with lethargy and tachycardia. Family at bedside reports patient is nonverbal at baseline but typically more alert. In the ED, patient is febrile, tachycardic with -190s, tachypneic, /60. EKG shows Afib with RVR. Diltiazem drip started in ED. Labs are significant for WBC of 16.5, lactic acid of 3.3, Cr 1.48, BUN 45.3, Na 155, K+ 5.1, troponin elevated at 0.118. UA with evidence of UTI. Of note, pt was being treated outpatient for a UTI, currently on day 4 of 7 day Rocephin course. Urine culture 10/16/24 with multidrug resistant Klebsiella pneumonia and Proteus mirabilis with susceptibility to Cefepime. Patient received 3L of NS and initiated on Vanc and Cefepime in ED. Admitted to the ICU on mechanical ventilation via trach.    Admitted to the ICU on 10/20   Peg tube extension to J-tube on 10/30    Interval History:  -still in AFib with RVR  -hemoglobin down to 6.1 yesterday, s/p 2U pRBCs and improved to 10.1   - potassium 3.5  -sputum culture positive for Acinetobacter sensitive only to gentamicin and tobramycin with Pseudomonas only sensitive to ciprofloxacin gentamicin and tobramycin  -T-max of 100.1° over the past 24 hours    Scheduled Medications:    amiodarone  200 mg Per G Tube Daily    atorvastatin  10 mg Per G Tube Daily    digoxin  0.25 mg Per G Tube Daily    meropenem IV  (PEDS and ADULTS)  2 g Intravenous Q8H    metoprolol tartrate  25 mg Per G Tube BID    pantoprazole  40 mg Intravenous Daily    QUEtiapine  50 mg Per G Tube BID    rivaroxaban  20 mg Per G Tube Nightly       PRN Medications:     Current Facility-Administered Medications:     0.9%  NaCl infusion (for blood administration), , Intravenous, Q24H PRN    acetaminophen, 650 mg, Per G Tube, Q6H PRN    dextromethorphan-guaiFENesin  mg/5 ml, 10 mL, Per G Tube, Q4H PRN    dextrose 10%, 12.5 g, Intravenous, PRN    dextrose 10%, 25 g, Intravenous, PRN    glucagon (human recombinant), 1 mg, Intramuscular, PRN    hydrALAZINE, 10 mg, Intravenous, Q4H PRN    metoclopramide HCl, 10 mg, Oral, Q8H PRN    naloxone, 0.02 mg, Intravenous, PRN    sodium chloride 0.9%, 10 mL, Intravenous, Q12H PRN      Infusions:          Fluid Balance:     Intake/Output Summary (Last 24 hours) at 11/6/2024 0706  Last data filed at 11/6/2024 0600  Gross per 24 hour   Intake 4568.14 ml   Output 2340 ml   Net 2228.14 ml         Vital Signs:   Vitals:    11/06/24 0537   BP:    Pulse: 82   Resp: (!) 28   Temp:          Physical Exam  Vitals and nursing note reviewed.   Constitutional:       Appearance: He is ill-appearing.   HENT:      Head: Normocephalic.      Right Ear: External ear normal.      Left Ear: External ear normal.      Nose: Nose normal.      Mouth/Throat:      Mouth: Mucous membranes are moist.      Pharynx: Oropharynx is clear. No oropharyngeal exudate or posterior oropharyngeal erythema.   Eyes:      General: No scleral icterus.     Extraocular Movements: Extraocular movements intact.      Conjunctiva/sclera: Conjunctivae normal.      Pupils: Pupils are equal, round, and reactive to light.   Neck:      Comments: Tracheostomy in place, site clean and dry  Cardiovascular:      Rate and Rhythm: Normal rate and regular rhythm.      Pulses: Normal pulses.      Heart sounds: Normal heart sounds. No murmur heard.     No friction rub. No gallop.  "  Pulmonary:      Effort: Pulmonary effort is normal. No respiratory distress.      Breath sounds: Normal breath sounds. No stridor. No wheezing, rhonchi or rales.      Comments: On mechanical ventilation via tracheostomy tube, no dyssynchrony seen on mechanical ventilation, no accessory muscle use   Chest:      Chest wall: No tenderness.   Abdominal:      General: Abdomen is flat. Bowel sounds are normal. There is no distension.      Palpations: Abdomen is soft.      Tenderness: There is no abdominal tenderness. There is no rebound.      Comments: J-tube in place site clean and dry   Musculoskeletal:      Cervical back: Normal range of motion. No rigidity or tenderness.   Skin:     General: Skin is warm and dry.      Capillary Refill: Capillary refill takes less than 2 seconds.      Coloration: Skin is not jaundiced.      Findings: No bruising, erythema or rash.   Neurological:      Mental Status: Mental status is at baseline.      Comments: Nonverbal, opens eyes to loud name call, not moving any extremities randomly or on command   Psychiatric:      Comments: Unable to fully assess           Ventilator Settings  Vent Mode: VOLUME A/C (11/06/24 0537)  Ventilator Initiated: Yes (10/20/24 1546)  Set Rate: 20 BPM (11/06/24 0537)  Vt Set: 500 mL (11/06/24 0537)  PEEP/CPAP: 5 cmH20 (11/06/24 0537)  Oxygen Concentration (%): 30 (11/06/24 0537)  Peak Airway Pressure: 23 cmH20 (11/06/24 0537)  Plateau Pressure: 3 cmH20 (11/01/24 0338)  Total Ve: 11 L/m (11/06/24 0537)  F/VT Ratio<105 (RSBI): (!) 61.81 (11/06/24 0537)      Laboratory Studies:   No results for input(s): "PH", "PCO2", "PO2", "HCO3", "POCSATURATED", "BE" in the last 24 hours.  Recent Labs   Lab 11/06/24 0239   WBC 12.84*   RBC 3.54*   HGB 10.1*   HCT 29.9*      MCV 84.5   MCH 28.5   MCHC 33.8     Recent Labs   Lab 11/06/24 0239   GLUCOSE 93      K 3.5   *   CO2 26   BUN 18.2   CREATININE 0.54*   CALCIUM 7.9*         Microbiology Data: "   Microbiology Results (last 7 days)       Procedure Component Value Units Date/Time    Respiratory Culture [[id number]]  (Abnormal)  (Susceptibility) Collected: 11/01/24 1619    Order Status: Completed Specimen: Respiratory Updated: 11/05/24 1152     Respiratory Culture Many ACINETOBACTER BAUMANNII      Many Pseudomonas aeruginosa     GRAM STAIN Quality 2+      Many Gram Negative Rods      Few Yeast    Clostridium Diff Toxin, A & B, EIA [[id number]]  (Normal) Collected: 11/02/24 1730    Order Status: Completed Specimen: Stool Updated: 11/04/24 0652     Clostridium Difficile GDH Antigen Negative     Clostridium Difficile Toxin A/B Negative              Imaging:   CT Head Without Contrast  Narrative: EXAMINATION:  CT HEAD WITHOUT CONTRAST    CLINICAL HISTORY:  Mental status change, unknown cause;    TECHNIQUE:  Low dose axial images were obtained through the head.  Coronal and sagittal reformations were also performed. Contrast was not administered.    Automatic exposure control was utilized to reduce the patient's radiation dose.    DLP= 1377    COMPARISON:  07/23/2024    FINDINGS:  No acute intracranial hemorrhage, edema or mass. No acute parenchymal abnormality.    Diffuse encephalomalacia of the right cerebral hemisphere appears grossly unchanged.    Ventriculomegaly is unchanged.    Postsurgical changes of right craniotomy is unchanged.    The mastoid air cells are clear.    The auditory canals are patent bilaterally.    The globes and orbital contents are normal bilaterally.    The visualized maxillary, ethmoid and sphenoid sinuses are clear.  Impression: No acute intracranial abnormality identified.  Extensive remote posttraumatic and postsurgical change noted.  This appears unchanged in the interval.    Electronically signed by: Jg Plunkett  Date:    11/03/2024  Time:    10:25          Assessment and Plan    Assessment:  Sepsis without shock with underlying VRE Enterococcus and ESBL Klebsiella bacteremia  with Klebsiella and Proteus pneumonia on 10/20/10/24  -now with Acinetobacter and Pseudomonas positive sputum culture on 11/01/2011/5 with Acinetobacter sensitive to gentamicin and tobramycin and Pseudomonas sensitive to ciprofloxacin gentamicin and tobramycin, suspect colonization without leukocytosis or increased sputum production or worsening hypoxia   -chronic hypoxemic respiratory failure with tracheostomy on permanent mechanical ventilatory support secondary to prior cerebrovascular accident with subsequent hemorrhagic stroke status post craniectomy   -AFib with RVR   -chronic sacral decubitus ulcer present on admission  -peg tube feed intolerance status post J-tube extension with tolerance of tube feeds  -altered mental status due to the above intracranial process    Plan:  -titrate mechanical ventilation for ARDS net protocol   -supplement oxygen to maintain saturation 94-96%   -routine tracheostomy care   -tube feeds to goal with free water flushes as appropriate   -in AFib on digoxin Lopressor and amiodarone still ongoing, nothing further to do for AFib   -currently on Meropenem and linezolid until 11/6 and 11/5 respectively  -now with the above new cultures from sputum although he has no worsening hypoxia no leukocytosis no persistent fever with suspicion this is colonization versus active infection, will hold off on starting further antibiotics   -wound care for sacral decubitus ulcer debrided at bedside on 11/02 with wound VAC placement on 11/04, appreciate assistance from Wound Care and from General surgery    Overall prognosis remains very poor    DVT ppx/tx with Xarelto  GI ppx with protonix  Keep HOB elevated > 30*      Eugene Patel MD  11/6/2024  Pulmonology/Critical Care

## 2024-11-07 LAB
ALBUMIN SERPL-MCNC: 1.8 G/DL (ref 3.4–4.8)
ALBUMIN/GLOB SERPL: 0.4 RATIO (ref 1.1–2)
ALP SERPL-CCNC: 84 UNIT/L (ref 40–150)
ALT SERPL-CCNC: 12 UNIT/L (ref 0–55)
ANION GAP SERPL CALC-SCNC: 8 MEQ/L
AST SERPL-CCNC: 19 UNIT/L (ref 5–34)
BASOPHILS # BLD AUTO: 0.03 X10(3)/MCL
BASOPHILS NFR BLD AUTO: 0.2 %
BILIRUB SERPL-MCNC: 1.1 MG/DL
BUN SERPL-MCNC: 18.5 MG/DL (ref 8.4–25.7)
CALCIUM SERPL-MCNC: 8 MG/DL (ref 8.8–10)
CHLORIDE SERPL-SCNC: 108 MMOL/L (ref 98–107)
CO2 SERPL-SCNC: 26 MMOL/L (ref 23–31)
CREAT SERPL-MCNC: 0.52 MG/DL (ref 0.72–1.25)
CREAT/UREA NIT SERPL: 36
EOSINOPHIL # BLD AUTO: 0.03 X10(3)/MCL (ref 0–0.9)
EOSINOPHIL NFR BLD AUTO: 0.2 %
ERYTHROCYTE [DISTWIDTH] IN BLOOD BY AUTOMATED COUNT: 19.4 % (ref 11.5–17)
GFR SERPLBLD CREATININE-BSD FMLA CKD-EPI: >60 ML/MIN/1.73/M2
GLOBULIN SER-MCNC: 4.1 GM/DL (ref 2.4–3.5)
GLUCOSE SERPL-MCNC: 105 MG/DL (ref 82–115)
HCT VFR BLD AUTO: 29.4 % (ref 42–52)
HGB BLD-MCNC: 9.8 G/DL (ref 14–18)
IMM GRANULOCYTES # BLD AUTO: 0.14 X10(3)/MCL (ref 0–0.04)
IMM GRANULOCYTES NFR BLD AUTO: 0.9 %
LYMPHOCYTES # BLD AUTO: 1.82 X10(3)/MCL (ref 0.6–4.6)
LYMPHOCYTES NFR BLD AUTO: 12.2 %
MCH RBC QN AUTO: 28.1 PG (ref 27–31)
MCHC RBC AUTO-ENTMCNC: 33.3 G/DL (ref 33–36)
MCV RBC AUTO: 84.2 FL (ref 80–94)
MONOCYTES # BLD AUTO: 0.89 X10(3)/MCL (ref 0.1–1.3)
MONOCYTES NFR BLD AUTO: 6 %
NEUTROPHILS # BLD AUTO: 12.04 X10(3)/MCL (ref 2.1–9.2)
NEUTROPHILS NFR BLD AUTO: 80.5 %
NRBC BLD AUTO-RTO: 0.2 %
PLATELET # BLD AUTO: 200 X10(3)/MCL (ref 130–400)
PMV BLD AUTO: 10.2 FL (ref 7.4–10.4)
POCT GLUCOSE: 123 MG/DL (ref 70–110)
POCT GLUCOSE: 134 MG/DL (ref 70–110)
POTASSIUM SERPL-SCNC: 3.8 MMOL/L (ref 3.5–5.1)
PROT SERPL-MCNC: 5.9 GM/DL (ref 5.8–7.6)
RBC # BLD AUTO: 3.49 X10(6)/MCL (ref 4.7–6.1)
SODIUM SERPL-SCNC: 142 MMOL/L (ref 136–145)
WBC # BLD AUTO: 14.95 X10(3)/MCL (ref 4.5–11.5)

## 2024-11-07 PROCEDURE — 27200966 HC CLOSED SUCTION SYSTEM

## 2024-11-07 PROCEDURE — 25000242 PHARM REV CODE 250 ALT 637 W/ HCPCS: Performed by: INTERNAL MEDICINE

## 2024-11-07 PROCEDURE — 27000207 HC ISOLATION

## 2024-11-07 PROCEDURE — 63600175 PHARM REV CODE 636 W HCPCS

## 2024-11-07 PROCEDURE — 25000003 PHARM REV CODE 250: Performed by: NURSE PRACTITIONER

## 2024-11-07 PROCEDURE — 85025 COMPLETE CBC W/AUTO DIFF WBC: CPT

## 2024-11-07 PROCEDURE — 99900035 HC TECH TIME PER 15 MIN (STAT)

## 2024-11-07 PROCEDURE — 94640 AIRWAY INHALATION TREATMENT: CPT

## 2024-11-07 PROCEDURE — 36415 COLL VENOUS BLD VENIPUNCTURE: CPT

## 2024-11-07 PROCEDURE — 20000000 HC ICU ROOM

## 2024-11-07 PROCEDURE — 99233 SBSQ HOSP IP/OBS HIGH 50: CPT | Mod: ,,,

## 2024-11-07 PROCEDURE — 99900026 HC AIRWAY MAINTENANCE (STAT)

## 2024-11-07 PROCEDURE — 27100171 HC OXYGEN HIGH FLOW UP TO 24 HOURS

## 2024-11-07 PROCEDURE — 25000003 PHARM REV CODE 250

## 2024-11-07 PROCEDURE — 80053 COMPREHEN METABOLIC PANEL: CPT

## 2024-11-07 PROCEDURE — 99900031 HC PATIENT EDUCATION (STAT)

## 2024-11-07 PROCEDURE — 97606 NEG PRS WND THER DME>50 SQCM: CPT

## 2024-11-07 PROCEDURE — 94760 N-INVAS EAR/PLS OXIMETRY 1: CPT

## 2024-11-07 PROCEDURE — 99900022

## 2024-11-07 PROCEDURE — 94761 N-INVAS EAR/PLS OXIMETRY MLT: CPT

## 2024-11-07 PROCEDURE — 94003 VENT MGMT INPAT SUBQ DAY: CPT

## 2024-11-07 RX ORDER — SODIUM CHLORIDE FOR INHALATION 3 %
4 VIAL, NEBULIZER (ML) INHALATION EVERY 8 HOURS
Status: DISCONTINUED | OUTPATIENT
Start: 2024-11-07 | End: 2024-11-07

## 2024-11-07 RX ORDER — SODIUM CHLORIDE FOR INHALATION 3 %
4 VIAL, NEBULIZER (ML) INHALATION EVERY 8 HOURS
Status: DISCONTINUED | OUTPATIENT
Start: 2024-11-07 | End: 2024-11-18 | Stop reason: HOSPADM

## 2024-11-07 RX ORDER — TOBRAMYCIN INHALATION SOLUTION 300 MG/5ML
300 INHALANT RESPIRATORY (INHALATION) EVERY 12 HOURS
Status: DISCONTINUED | OUTPATIENT
Start: 2024-11-07 | End: 2024-11-07

## 2024-11-07 RX ORDER — TOBRAMYCIN INHALATION SOLUTION 300 MG/5ML
300 INHALANT RESPIRATORY (INHALATION) EVERY 12 HOURS
Status: DISCONTINUED | OUTPATIENT
Start: 2024-11-07 | End: 2024-11-12

## 2024-11-07 RX ADMIN — METOPROLOL TARTRATE 25 MG: 25 TABLET, FILM COATED ORAL at 09:11

## 2024-11-07 RX ADMIN — Medication 4 ML: at 03:11

## 2024-11-07 RX ADMIN — QUETIAPINE FUMARATE 50 MG: 25 TABLET ORAL at 09:11

## 2024-11-07 RX ADMIN — TOBRAMYCIN 300 MG: 300 SOLUTION RESPIRATORY (INHALATION) at 08:11

## 2024-11-07 RX ADMIN — ATORVASTATIN CALCIUM 10 MG: 10 TABLET, FILM COATED ORAL at 08:11

## 2024-11-07 RX ADMIN — Medication 4 ML: at 11:11

## 2024-11-07 RX ADMIN — QUETIAPINE FUMARATE 50 MG: 25 TABLET ORAL at 08:11

## 2024-11-07 RX ADMIN — AMIODARONE HYDROCHLORIDE 200 MG: 200 TABLET ORAL at 08:11

## 2024-11-07 RX ADMIN — DIGOXIN 0.25 MG: 125 TABLET ORAL at 08:11

## 2024-11-07 RX ADMIN — PANTOPRAZOLE SODIUM 40 MG: 40 INJECTION, POWDER, LYOPHILIZED, FOR SOLUTION INTRAVENOUS at 08:11

## 2024-11-07 RX ADMIN — RIVAROXABAN 20 MG: 10 TABLET, FILM COATED ORAL at 09:11

## 2024-11-07 RX ADMIN — METOPROLOL TARTRATE 25 MG: 25 TABLET, FILM COATED ORAL at 08:11

## 2024-11-07 NOTE — SUBJECTIVE & OBJECTIVE
Subjective:     HPI:  Wound medicine re-check    The patient is a 69 year old male who presented to [facility] ED on 10/20/24 from [facility] with decreased responsiveness, sepsis, and recurrent UTI. Noted to be hypotensive with Afib and RVR, also requiring mechanical ventilation and admitted to the ICU.   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 Patient is non-verbal at baseline, contracted upper and lower extremities on left side. 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   Blood cultures on admission + Enterococcus faecium - VRE per BCID. Urine culture with Klebsiella and Proteus. ID guiding antibiotic stewardship, currently receiving meropenem and linezolid.     Patient admitted with unstageable pressure ulcer of sacrum; seen by inpatient wound nurse and treatment was iniatated, wound medicine consulted for evaluation and possible debridement.   No family present at time of assessment, all information gained through chart review. In June 2024 appears that patient was seen by wound  for sacral pressure ulcer, no visit notes or images availabe from this time frame. First image of sacral region in May 2024 with wound of sacrum/coccyx. In July 2024 images appears that wound had healed with remaining dark discoloration of sacrum/buttocks and then again noted with wound in images of late August 2024; appears to have evolved and worsened up to this point.  Initial evaluation on 10/22/24 - several pressure ulcers with most concerning is the sacral/coccyx unstageable ulcer. Also with fecal incontinence which is contaminating the wound as it is veryc close to the anus. He is contracted and we were unable to get full visualization and positioning for bedside debridement. Recommendations made for  palliative care consultation  to discuss overall goals of care.   Palliative care consulted and discussion held with family in which they have decided to continue with treatment.   Bedside debridement of sacral ulcer with Surgery on 11/2/24.   Wound Vac applied on 11/4/24 11/7//24 - wound re-check today. Met patient in room IN02, he is awake, non-verbal with trach on mechanical ventilation, yellow frothy secretions in trach and tubing, + cough. He is on ICU low air loss bed with bilateral heel boots on, wedge under his left side. Accompanied by ICU nurse as well as inpatient wound nurse. Family at bedside today.  Difficult to reposition because of contractures.  No acute distress.                Hospital Course:   No notes on file      Follow-up For: Procedure(s) (LRB):  EGD w/ Jtube placement (N/A)    Post-Operative Day: 8 Days Post-Op    Scheduled Meds:   amiodarone  200 mg Per G Tube Daily    atorvastatin  10 mg Per G Tube Daily    digoxin  0.25 mg Per G Tube Daily    metoprolol tartrate  25 mg Per G Tube BID    pantoprazole  40 mg Intravenous Daily    QUEtiapine  50 mg Per G Tube BID    rivaroxaban  20 mg Per G Tube Nightly    sodium chloride 3%  4 mL Nebulization Q8H    tobramycin (PF)  300 mg Nebulization Q12H     Continuous Infusions:  PRN Meds:  Current Facility-Administered Medications:     0.9%  NaCl infusion (for blood administration), , Intravenous, Q24H PRN    acetaminophen, 650 mg, Per G Tube, Q6H PRN    dextromethorphan-guaiFENesin  mg/5 ml, 10 mL, Per G Tube, Q4H PRN    dextrose 10%, 12.5 g, Intravenous, PRN    dextrose 10%, 25 g, Intravenous, PRN    glucagon (human recombinant), 1 mg, Intramuscular, PRN    hydrALAZINE, 10 mg, Intravenous, Q4H PRN    metoclopramide HCl, 10 mg, Oral, Q8H PRN    naloxone, 0.02 mg, Intravenous, PRN    sodium chloride 0.9%, 10 mL, Intravenous, Q12H PRN    Review of Systems   Unable to perform ROS: Patient nonverbal     Objective:     Vital Signs (Most  Recent):  Temp: (P) 99.7 °F (37.6 °C) (11/07/24 1200)  Pulse: 108 (11/07/24 1230)  Resp: (!) 30 (11/07/24 1230)  BP: 111/62 (11/07/24 1230)  SpO2: 99 % (11/07/24 1230) Vital Signs (24h Range):  Temp:  [99.2 °F (37.3 °C)-100.1 °F (37.8 °C)] (P) 99.7 °F (37.6 °C)  Pulse:  [] 108  Resp:  [6-45] 30  SpO2:  [75 %-99 %] 99 %  BP: ()/() 111/62     Weight: 69.1 kg (152 lb 5.4 oz)  Body mass index is 22.49 kg/m².     Physical Exam  Vitals reviewed.   Constitutional:       Comments: Rectal tube     HENT:      Head:      Comments: Right bone flap      Neck:      Comments: Tracheostomy   Pulmonary:      Comments: Mechanical ventilation    Abdominal:      Comments: PEG/J-Tube   Skin:     General: Skin is warm and dry.      Capillary Refill: Capillary refill takes less than 2 seconds.      Findings: Wound present.             Comments:     Left 1st and 2nd toe abrasions - JAYME   Neurological:      Comments: Left upper and lower extremity contracted  Moves right upper extremity freely and reaches to grab onto nurse,  does not follow command        Sacrum: 6 x 8 x 2.6 cm       Left knee; medial: 0.3 x 0.2 x cm   Lateral: 1 x 1.5 cm      Left lateral ankle: 0.8 x 0.8 cm              Laboratory:  A1C:   Recent Labs   Lab 08/26/24  1221   HGBA1C 5.3       BMP:   Recent Labs   Lab 11/07/24  0252      K 3.8   *   CO2 26   BUN 18.5   CREATININE 0.52*   CALCIUM 8.0*       CBC:   Recent Labs   Lab 11/07/24  0252   WBC 14.95*   RBC 3.49*   HGB 9.8*   HCT 29.4*      MCV 84.2   MCH 28.1   MCHC 33.3     CMP:   Recent Labs   Lab 11/07/24  0252   CALCIUM 8.0*   ALBUMIN 1.8*      K 3.8   CO2 26   *   BUN 18.5   CREATININE 0.52*   ALKPHOS 84   ALT 12   AST 19   BILITOT 1.1       LFTs:   Recent Labs   Lab 11/07/24  0252   ALT 12   AST 19   ALKPHOS 84   BILITOT 1.1   ALBUMIN 1.8*       Microbiology Results (last 7 days)       Procedure Component Value Units Date/Time    Respiratory Culture [[id number]]   (Abnormal)  (Susceptibility) Collected: 11/01/24 1619    Order Status: Completed Specimen: Respiratory Updated: 11/06/24 1117     Respiratory Culture Many ACINETOBACTER BAUMANNII      Many Pseudomonas aeruginosa     GRAM STAIN Quality 2+      Many Gram Negative Rods      Few Yeast    Clostridium Diff Toxin, A & B, EIA [[id number]]  (Normal) Collected: 11/02/24 1730    Order Status: Completed Specimen: Stool Updated: 11/04/24 0652     Clostridium Difficile GDH Antigen Negative     Clostridium Difficile Toxin A/B Negative            Diagnostic Results:  I have reviewed all pertinent imaging results/findings within the past 24 hours.

## 2024-11-07 NOTE — PLAN OF CARE
Clinical updates sent to patient's facility of residence, [place]. Made facility aware of possible discharge tomorrow.

## 2024-11-07 NOTE — PROGRESS NOTES
[facility] - [facility] ICU  Wound Care  Progress Note    Patient Name: Delio Daniel Jr.  MRN: [id number]  Admission Date: 10/20/2024  Hospital Length of Stay: 18 days  Attending Physician: Itz Francisco MD  Primary Care Provider: Jl Briones MD     Subjective:     HPI:  Wound medicine re-check    The patient is a 69 year old male who presented to [facility] ED on 10/20/24 from [facility] with decreased responsiveness, sepsis, and recurrent UTI. Noted to be hypotensive with Afib and RVR, also requiring mechanical ventilation and admitted to the ICU.   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 Patient is non-verbal at baseline, contracted upper and lower extremities on left side. 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   Blood cultures on admission + Enterococcus faecium - VRE per BCID. Urine culture with Klebsiella and Proteus. ID guiding antibiotic stewardship, currently receiving meropenem and linezolid.     Patient admitted with unstageable pressure ulcer of sacrum; seen by inpatient wound nurse and treatment was iniatated, wound medicine consulted for evaluation and possible debridement.   No family present at time of assessment, all information gained through chart review. In June 2024 appears that patient was seen by wound  for sacral pressure ulcer, no visit notes or images availabe from this time frame. First image of sacral region in May 2024 with wound of sacrum/coccyx. In July 2024 images appears that wound had healed with remaining dark discoloration of sacrum/buttocks and then again noted with wound in images of late August 2024; appears to have evolved and worsened up to this point.  Initial evaluation on 10/22/24 - several pressure ulcers with most  concerning is the sacral/coccyx unstageable ulcer. Also with fecal incontinence which is contaminating the wound as it is veryc close to the anus. He is contracted and we were unable to get full visualization and positioning for bedside debridement. Recommendations made for palliative care consultation  to discuss overall goals of care.   Palliative care consulted and discussion held with family in which they have decided to continue with treatment.   Bedside debridement of sacral ulcer with Surgery on 11/2/24.   Wound Vac applied on 11/4/24 11/7//24 - wound re-check today. Met patient in room IN02, he is awake, non-verbal with trach on mechanical ventilation, yellow frothy secretions in trach and tubing, + cough. He is on ICU low air loss bed with bilateral heel boots on, wedge under his left side. Accompanied by ICU nurse as well as inpatient wound nurse. Family at bedside today.  Difficult to reposition because of contractures.  No acute distress.                Hospital Course:   No notes on file      Follow-up For: Procedure(s) (LRB):  EGD w/ Jtube placement (N/A)    Post-Operative Day: 8 Days Post-Op    Scheduled Meds:   amiodarone  200 mg Per G Tube Daily    atorvastatin  10 mg Per G Tube Daily    digoxin  0.25 mg Per G Tube Daily    metoprolol tartrate  25 mg Per G Tube BID    pantoprazole  40 mg Intravenous Daily    QUEtiapine  50 mg Per G Tube BID    rivaroxaban  20 mg Per G Tube Nightly    sodium chloride 3%  4 mL Nebulization Q8H    tobramycin (PF)  300 mg Nebulization Q12H     Continuous Infusions:  PRN Meds:  Current Facility-Administered Medications:     0.9%  NaCl infusion (for blood administration), , Intravenous, Q24H PRN    acetaminophen, 650 mg, Per G Tube, Q6H PRN    dextromethorphan-guaiFENesin  mg/5 ml, 10 mL, Per G Tube, Q4H PRN    dextrose 10%, 12.5 g, Intravenous, PRN    dextrose 10%, 25 g, Intravenous, PRN    glucagon (human recombinant), 1 mg, Intramuscular, PRN    hydrALAZINE,  10 mg, Intravenous, Q4H PRN    metoclopramide HCl, 10 mg, Oral, Q8H PRN    naloxone, 0.02 mg, Intravenous, PRN    sodium chloride 0.9%, 10 mL, Intravenous, Q12H PRN    Review of Systems   Unable to perform ROS: Patient nonverbal     Objective:     Vital Signs (Most Recent):  Temp: (P) 99.7 °F (37.6 °C) (11/07/24 1200)  Pulse: 108 (11/07/24 1230)  Resp: (!) 30 (11/07/24 1230)  BP: 111/62 (11/07/24 1230)  SpO2: 99 % (11/07/24 1230) Vital Signs (24h Range):  Temp:  [99.2 °F (37.3 °C)-100.1 °F (37.8 °C)] (P) 99.7 °F (37.6 °C)  Pulse:  [] 108  Resp:  [6-45] 30  SpO2:  [75 %-99 %] 99 %  BP: ()/() 111/62     Weight: 69.1 kg (152 lb 5.4 oz)  Body mass index is 22.49 kg/m².     Physical Exam  Vitals reviewed.   Constitutional:       Comments: Rectal tube     HENT:      Head:      Comments: Right bone flap      Neck:      Comments: Tracheostomy   Pulmonary:      Comments: Mechanical ventilation    Abdominal:      Comments: PEG/J-Tube   Skin:     General: Skin is warm and dry.      Capillary Refill: Capillary refill takes less than 2 seconds.      Findings: Wound present.             Comments:     Left 1st and 2nd toe abrasions - JAYME   Neurological:      Comments: Left upper and lower extremity contracted  Moves right upper extremity freely and reaches to grab onto nurse,  does not follow command        Sacrum: 6 x 8 x 2.6 cm       Left knee; medial: 0.3 x 0.2 x cm   Lateral: 1 x 1.5 cm      Left lateral ankle: 0.8 x 0.8 cm              Laboratory:  A1C:   Recent Labs   Lab 08/26/24  1221   HGBA1C 5.3       BMP:   Recent Labs   Lab 11/07/24  0252      K 3.8   *   CO2 26   BUN 18.5   CREATININE 0.52*   CALCIUM 8.0*       CBC:   Recent Labs   Lab 11/07/24  0252   WBC 14.95*   RBC 3.49*   HGB 9.8*   HCT 29.4*      MCV 84.2   MCH 28.1   MCHC 33.3     CMP:   Recent Labs   Lab 11/07/24  0252   CALCIUM 8.0*   ALBUMIN 1.8*      K 3.8   CO2 26   *   BUN 18.5   CREATININE 0.52*   ALKPHOS  84   ALT 12   AST 19   BILITOT 1.1       LFTs:   Recent Labs   Lab 11/07/24  0252   ALT 12   AST 19   ALKPHOS 84   BILITOT 1.1   ALBUMIN 1.8*       Microbiology Results (last 7 days)       Procedure Component Value Units Date/Time    Respiratory Culture [[id number]]  (Abnormal)  (Susceptibility) Collected: 11/01/24 1619    Order Status: Completed Specimen: Respiratory Updated: 11/06/24 1117     Respiratory Culture Many ACINETOBACTER BAUMANNII      Many Pseudomonas aeruginosa     GRAM STAIN Quality 2+      Many Gram Negative Rods      Few Yeast    Clostridium Diff Toxin, A & B, EIA [[id number]]  (Normal) Collected: 11/02/24 1730    Order Status: Completed Specimen: Stool Updated: 11/04/24 0652     Clostridium Difficile GDH Antigen Negative     Clostridium Difficile Toxin A/B Negative            Diagnostic Results:  I have reviewed all pertinent imaging results/findings within the past 24 hours.    Assessment/Plan:             Unstageable pressure ulcer of sacrum - present on admission , bedside debridement per surgery 11/2/214; wound vac applied on 11/4/24  Stage 3 pressure ulcer of left knee - present on admission  Stage 4 pressure ulcer of left lateral ankle - present on admission  CVA with residual cognitive/motor deficits; contractures   VRE bacteremia  Klebsiella bacteremia  Tracheostomy and PEG tube dependent           PLAN:     Chart reviewed, patient examined and wounds assessed.  Opinion: Wounds re assessed today, s/p wound vac application on 11/4/24. Still with yellow slough covering depth of wound. No obvious signs of infection. Continue Wound Vac.   Left knee and ankle wounds without signs of infection. Continue to cleanse with Vashe, apply mupirocin ointment and adaptic then cover with foam border daily.    Continue to pad left side rail where knee rubs to prevent further breakdown.   Wound care orders: Wound Vac - If it comes off or looses suction remove and resume previous wound care orders - Sacrum -  cleanse with Vashe, apply Vashe moistened Mesalt to wound bed, cover with ABD pad and secure with cloth tape, BID and PRN.   Monitor for signs & symptoms of deterioration  Offloading of sacrum/buttocks/heels at all times: YOLETTE mattress, turning q 2 hrs; use of wedges and heel offloading devices to be used at all times while in bed; ( CHATA Zavala). This needs to be reinforced by every staff nurse caring for patient on every shift of every day.  Incontinence: control moisture/wound contamination: No briefs; use things such as purewick or underwood catheter; rectal tube  Nutrition: Follow RD  recommendations for tube feeds; GI following for frequent regurgitation of feeds, J-tube placement w/EGD on 10/30/24.   Will try to follow weekly while admitted, but every nurse assigned to patient on every shift of every day needs to address daily wound care dressing changes and offloading modalities including using heel offloading devices, wedges, YOLETTE mattress etc  Discussed with patient as well as nurse caring for patient today           The time spent including preparing to see the patient, obtaining patient history and assessment, evaluation of the plan of care, patient/caregiver counseling and education, orders, documentation, coordination of care, and other professional medical management activities for today's encounter was 60 minutes             Wound Care  [facility] - [facility] ICU

## 2024-11-07 NOTE — PROGRESS NOTES
Pulmonary & Critical Care Medicine   Progress Note      Presenting History/HPI:    Patient is a 68 y/o male with extensive PMH who presented from NH on 10/20/24 with decreased responsiveness, severe sepsis, and recurrent UTI. 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     Patient transferred to ED from [facility] with lethargy and tachycardia. Family at bedside reports patient is nonverbal at baseline but typically more alert. In the ED, patient is febrile, tachycardic with -190s, tachypneic, /60. EKG shows Afib with RVR. Diltiazem drip started in ED. Labs are significant for WBC of 16.5, lactic acid of 3.3, Cr 1.48, BUN 45.3, Na 155, K+ 5.1, troponin elevated at 0.118. UA with evidence of UTI. Of note, pt was being treated outpatient for a UTI, currently on day 4 of 7 day Rocephin course. Urine culture 10/16/24 with multidrug resistant Klebsiella pneumonia and Proteus mirabilis with susceptibility to Cefepime. Patient received 3L of NS and initiated on Vanc and Cefepime in ED. Admitted to the ICU on mechanical ventilation via trach.    Admitted to the ICU on 10/20   Peg tube extension to J-tube on 10/30    Interval History:  NAEON. Vitals shows patient mildly tachy and tachypnic with saturations stable on 30% mechanical ventilation. Telemetry continues to show atrial irregular irregular rhythm consistent with atrial fibrillation. CBC showed up trending leukocytosis with left shift unstable normocytic anemia post transfusion.  CMP showed hypoalbuminemia but otherwise unremarkable. Sputum culture positive for Acinetobacter sensitive only to gentamicin and tobramycin with Pseudomonas only sensitive  to ciprofloxacin gentamicin and tobramycin. Completed treatment with IV linezolid and meropenem. TAt this point in time his current sputum culture probably represents colonization. Will continue with disposition back to the nursing home. The patient was ventilator dependent no plans to wean from mechanical ventilation. Patient neurologic status waxes and wanes with no significant change or improvement.     Scheduled Medications:    amiodarone  200 mg Per G Tube Daily    atorvastatin  10 mg Per G Tube Daily    digoxin  0.25 mg Per G Tube Daily    metoprolol tartrate  25 mg Per G Tube BID    pantoprazole  40 mg Intravenous Daily    QUEtiapine  50 mg Per G Tube BID    rivaroxaban  20 mg Per G Tube Nightly       PRN Medications:     Current Facility-Administered Medications:     0.9%  NaCl infusion (for blood administration), , Intravenous, Q24H PRN    acetaminophen, 650 mg, Per G Tube, Q6H PRN    dextromethorphan-guaiFENesin  mg/5 ml, 10 mL, Per G Tube, Q4H PRN    dextrose 10%, 12.5 g, Intravenous, PRN    dextrose 10%, 25 g, Intravenous, PRN    glucagon (human recombinant), 1 mg, Intramuscular, PRN    hydrALAZINE, 10 mg, Intravenous, Q4H PRN    metoclopramide HCl, 10 mg, Oral, Q8H PRN    naloxone, 0.02 mg, Intravenous, PRN    sodium chloride 0.9%, 10 mL, Intravenous, Q12H PRN      Infusions:          Fluid Balance:     Intake/Output Summary (Last 24 hours) at 11/7/2024 0404  Last data filed at 11/7/2024 0200  Gross per 24 hour   Intake 2521 ml   Output 2020 ml   Net 501 ml         Vital Signs:   Vitals:    11/07/24 0300   BP: 114/64   Pulse: 92   Resp: (!) 24   Temp:          Physical Exam  Vitals and nursing note reviewed.   Constitutional:       Appearance: He is ill-appearing.   HENT:      Head: Normocephalic.      Right Ear: External ear normal.      Left Ear: External ear normal.      Nose: Nose normal.      Mouth/Throat:      Mouth: Mucous membranes are moist.      Pharynx: Oropharynx is clear. No  oropharyngeal exudate or posterior oropharyngeal erythema.   Eyes:      General: No scleral icterus.     Extraocular Movements: Extraocular movements intact.      Conjunctiva/sclera: Conjunctivae normal.      Pupils: Pupils are equal, round, and reactive to light.   Neck:      Comments: Tracheostomy in place, site clean and dry  Cardiovascular:      Rate and Rhythm: Normal rate and regular rhythm.      Pulses: Normal pulses.      Heart sounds: Normal heart sounds. No murmur heard.     No friction rub. No gallop.   Pulmonary:      Effort: Pulmonary effort is normal. No respiratory distress.      Breath sounds: Normal breath sounds. No stridor. No wheezing, rhonchi or rales.      Comments: On mechanical ventilation via tracheostomy tube, no dyssynchrony seen on mechanical ventilation, no accessory muscle use   Chest:      Chest wall: No tenderness.   Abdominal:      General: Abdomen is flat. Bowel sounds are normal. There is no distension.      Palpations: Abdomen is soft.      Tenderness: There is no abdominal tenderness. There is no rebound.      Comments: J-tube in place site clean and dry   Musculoskeletal:      Cervical back: Normal range of motion. No rigidity or tenderness.   Skin:     General: Skin is warm and dry.      Capillary Refill: Capillary refill takes less than 2 seconds.      Coloration: Skin is not jaundiced.      Findings: No bruising, erythema or rash.   Neurological:      Mental Status: Mental status is at baseline.      Comments: Nonverbal, opens eyes to loud name call, not moving any extremities randomly or on command   Psychiatric:      Comments: Unable to fully assess       Ventilator Settings  Vent Mode: A/C (11/07/24 0123)  Ventilator Initiated: Yes (10/20/24 1546)  Set Rate: 20 BPM (11/07/24 0123)  Vt Set: 500 mL (11/07/24 0123)  PEEP/CPAP: 5 cmH20 (11/07/24 0123)  Oxygen Concentration (%): 30 (11/07/24 0123)  Peak Airway Pressure: 16 cmH20 (11/07/24 0123)  Plateau Pressure: 3 cmH20  "(11/01/24 0338)  Total Ve: 11.4 L/m (11/07/24 0123)  F/VT Ratio<105 (RSBI): (!) 51.58 (11/06/24 1000)      Laboratory Studies:   No results for input(s): "PH", "PCO2", "PO2", "HCO3", "POCSATURATED", "BE" in the last 24 hours.  Recent Labs   Lab 11/07/24  0252   WBC 14.95*   RBC 3.49*   HGB 9.8*   HCT 29.4*      MCV 84.2   MCH 28.1   MCHC 33.3     Recent Labs   Lab 11/07/24  0252   GLUCOSE 105      K 3.8   *   CO2 26   BUN 18.5   CREATININE 0.52*   CALCIUM 8.0*         Microbiology Data:   Microbiology Results (last 7 days)       Procedure Component Value Units Date/Time    Respiratory Culture [[id number]]  (Abnormal)  (Susceptibility) Collected: 11/01/24 1619    Order Status: Completed Specimen: Respiratory Updated: 11/06/24 1117     Respiratory Culture Many ACINETOBACTER BAUMANNII      Many Pseudomonas aeruginosa     GRAM STAIN Quality 2+      Many Gram Negative Rods      Few Yeast    Clostridium Diff Toxin, A & B, EIA [[id number]]  (Normal) Collected: 11/02/24 1730    Order Status: Completed Specimen: Stool Updated: 11/04/24 0652     Clostridium Difficile GDH Antigen Negative     Clostridium Difficile Toxin A/B Negative              Imaging:   CT Head Without Contrast  Narrative: EXAMINATION:  CT HEAD WITHOUT CONTRAST    CLINICAL HISTORY:  Mental status change, unknown cause;    TECHNIQUE:  Low dose axial images were obtained through the head.  Coronal and sagittal reformations were also performed. Contrast was not administered.    Automatic exposure control was utilized to reduce the patient's radiation dose.    DLP= 1377    COMPARISON:  07/23/2024    FINDINGS:  No acute intracranial hemorrhage, edema or mass. No acute parenchymal abnormality.    Diffuse encephalomalacia of the right cerebral hemisphere appears grossly unchanged.    Ventriculomegaly is unchanged.    Postsurgical changes of right craniotomy is unchanged.    The mastoid air cells are clear.    The auditory canals are patent " bilaterally.    The globes and orbital contents are normal bilaterally.    The visualized maxillary, ethmoid and sphenoid sinuses are clear.  Impression: No acute intracranial abnormality identified.  Extensive remote posttraumatic and postsurgical change noted.  This appears unchanged in the interval.    Electronically signed by: Jg Plunkett  Date:    11/03/2024  Time:    10:25          Assessment and Plan    Assessment:  Sepsis without shock with underlying VRE Enterococcus and ESBL Klebsiella bacteremia with Klebsiella and Proteus pneumonia on 10/20/10/24  -now with Acinetobacter and Pseudomonas positive sputum culture on 11/01/2024 with Acinetobacter sensitive to gentamicin and tobramycin and Pseudomonas sensitive to ciprofloxacin gentamicin and tobramycin, suspect colonization without leukocytosis or increased sputum production or worsening hypoxia   -chronic hypoxemic respiratory failure with tracheostomy on permanent mechanical ventilatory support secondary to prior cerebrovascular accident with subsequent hemorrhagic stroke status post craniectomy   -AFib with RVR   -chronic sacral decubitus ulcer present on admission  -peg tube feed intolerance status post J-tube extension with tolerance of tube feeds  -altered mental status due to the above intracranial process    Plan:  -titrate mechanical ventilation for ARDS net protocol   -supplement oxygen to maintain saturation 94-96%   -routine tracheostomy care   -tube feeds to goal with free water flushes as appropriate   -in AFib on digoxin Lopressor and amiodarone still ongoing, nothing further to do for AFib   -completed treatment with IV meropenem and linezolid  -now with the above new cultures from sputum although he has no worsening hypoxia no leukocytosis no persistent fever with suspicion this is colonization versus active infection, will hold off on starting further antibiotics   -wound care for sacral decubitus ulcer debrided at bedside on 11/02 with  wound VAC placement on 11/04, appreciate assistance from Wound Care and from General surgery    Overall prognosis remains very poor    DVT ppx/tx with Xarelto  GI ppx with protonix  Keep HOB elevated > 30*      Eugene Patel MD  11/7/2024  Pulmonology/Critical Care

## 2024-11-07 NOTE — PROGRESS NOTES
Inpatient Nutrition Assessment    Admit Date: 10/20/2024   Total duration of encounter: 18 days   Patient Age: 69 y.o.    Nutrition Recommendation/Prescription     Tube feeding recommendation:     Impact Peptide 1.5 goal rate 70 ml/hr to provide  2100 kcal/d  (111% est needs)  131 g protein/d (100% est needs)  1078 ml free water/d (52% est needs)  (calculations based on estimated 20 hr/d run time)     If no IV fluids running, can give 100ml q 2hr water flushes. Total water provided: 2078ml (100% est needs.)     Patel (provides 90 kcal, 2.5 g protein per serving) BID.    Consider per MD:  MVI +Fe 1 po daily  Vit C 500mg BID  Vit A 10,000 IU daily  Zinc sulfate 220mg Daily     Communication of Recommendations: reviewed with nurse    Nutrition Assessment     Malnutrition Assessment/Nutrition-Focused Physical Exam    Malnutrition Context: chronic illness (10/21/24 1028)  Malnutrition Level: severe (10/21/24 1028)  Energy Intake (Malnutrition):  (unable to eval) (10/21/24 1028)  Weight Loss (Malnutrition): greater than 10% in 6 months (10/21/24 1028)  Subcutaneous Fat (Malnutrition): severe depletion (10/21/24 1028)  Orbital Region (Subcutaneous Fat Loss): severe depletion  Upper Arm Region (Subcutaneous Fat Loss): severe depletion     Muscle Mass (Malnutrition): severe depletion (10/21/24 1028)     Clavicle Bone Region (Muscle Loss): severe depletion  Clavicle and Acromion Bone Region (Muscle Loss): severe depletion  Scapular Bone Region (Muscle Loss): severe depletion              Fluid Accumulation (Malnutrition):  (does not meet criteria) (10/21/24 1028)        A minimum of two characteristics is recommended for diagnosis of either severe or non-severe malnutrition.    Chart Review    Reason Seen: continuous nutrition monitoring and physician consult for TF    Malnutrition Screening Tool Results   Have you recently lost weight without trying?: Unsure  Have you been eating poorly because of a decreased appetite?: No    MST Score: 2   Diagnosis:  Severe sepsis   UTI  Afib with RVR  Hypernatremia   Sacral wound    Relevant Medical History:    Arthritis      Atrial fibrillation      BPH (benign prostatic hyperplasia)      Cardiac arrest      Coronary artery disease      Cyst, kidney, acquired      Diverticulosis      Hyperlipidemia      Hypertension      MI (myocardial infarction)      Obesity      Steatosis of liver      Stroke      Scheduled Medications:  amiodarone, 200 mg, Daily  atorvastatin, 10 mg, Daily  digoxin, 0.25 mg, Daily  metoprolol tartrate, 25 mg, BID  pantoprazole, 40 mg, Daily  QUEtiapine, 50 mg, BID  rivaroxaban, 20 mg, Nightly  sodium chloride 3%, 4 mL, Q8H  tobramycin (PF), 300 mg, Q12H    Continuous Infusions:       PRN Medications:  0.9%  NaCl infusion (for blood administration), , Q24H PRN  acetaminophen, 650 mg, Q6H PRN  dextromethorphan-guaiFENesin  mg/5 ml, 10 mL, Q4H PRN  dextrose 10%, 12.5 g, PRN  dextrose 10%, 25 g, PRN  glucagon (human recombinant), 1 mg, PRN  hydrALAZINE, 10 mg, Q4H PRN  metoclopramide HCl, 10 mg, Q8H PRN  naloxone, 0.02 mg, PRN  sodium chloride 0.9%, 10 mL, Q12H PRN    Calorie Containing IV Medications: no significant kcals from medications at this time    Recent Labs   Lab 11/03/24  0348 11/04/24  0228 11/05/24  0451 11/06/24  0239 11/07/24  0252    144 142 142 142   K 2.8* 3.2* 3.1* 3.5 3.8   CALCIUM 7.7* 7.8* 7.1* 7.9* 8.0*   PHOS  --  <0.7*  --   --   --    * 108* 107 108* 108*   CO2 28 27 26 26 26   BUN 18.2 16.2 18.3 18.2 18.5   CREATININE 0.56* 0.55* 0.56* 0.54* 0.52*   EGFRNORACEVR >60 >60 >60 >60 >60   GLUCOSE 98 103 102 93 105   BILITOT 0.5 0.6 0.8 1.5 1.1   ALKPHOS 106 104 84 91 84   ALT 9 11 8 11 12   AST 12 13 12 15 19   ALBUMIN 2.0* 2.0* 1.8* 1.8* 1.8*   WBC 9.29 7.81 8.67 12.84* 14.95*   HGB 7.7* 7.5* 6.1* 10.1* 9.8*   HCT 24.3* 22.9* 18.8* 29.9* 29.4*     Nutrition Orders:  Diet NPO  Tube Feedings/Formulas 70; 1,400; Impact Peptide 1.5; Peg; 100;  "Every 2 hours,Tube Feedings/Formulas Other (see comments); Peg; Patel - Orange    Appetite/Oral Intake: not applicable/not applicable  Factors Affecting Nutritional Intake: on mechanical ventilation and tracheostomy  Social Needs Impacting Access to Food: none identified (from NH)  Food/Anabaptism/Cultural Preferences: unable to obtain  Food Allergies: no known food allergies  Last Bowel Movement: 24  Wound(s):[REMOVED]      Altered Skin Integrity 24 1100 lower Buttocks Moisture associated dermatitis-Tissue loss description: Full thickness       Wound 24 1500 Pressure Injury Left anterior Leg-Tissue loss description: Partial thickness       Wound 24 1420 Pressure Injury Left Knee-Tissue loss description: Partial thickness       Wound 10/20/24 2100 Pressure Injury Sacral spine-Tissue loss description: Full thickness       Wound 24 1500 Pressure Injury Left lateral Ankle-Tissue loss description: Partial thickness     Comments    10/21/24: Pt with previous EMR wts indicating wt loss. On NH TF. Also with increased protein needs due to wounds.     10/25/24: TF on hold. Pt continues with vomiting. On Reglan.     10/29/24: TF still off. Plans for gtube extension into jejunum.     24: TF @ 55ml/hr, plans to advance per RN. Pt now with G-J tube placement. Tolerated so far per RN.     24: TF continues. Tolerated per RN. Noted recent labs, may need to consider adding additional vitamin and minerals.     24: TF continues, tolerated @ goal rate per RN.     Anthropometrics    Height: 5' 9.02" (175.3 cm), Height Method: Estimated  Last Weight: 69.1 kg (152 lb 5.4 oz) (10/21/24 1026), Weight Method: Bed Scale  BMI (Calculated): 22.5  BMI Classification: normal (BMI 18.5-24.9)        Ideal Body Weight (IBW), Male: 160.12 lb     % Ideal Body Weight, Male (lb): 95.21 %                 Usual Body Weight (UBW), k kg (per EMR from 24)  % Usual Body Weight: 76.94  % Weight Change From " Usual Weight: -23.22 %  Usual Weight Provided By: EMR weight history    Wt Readings from Last 5 Encounters:   10/21/24 69.1 kg (152 lb 5.4 oz)   08/19/24 90.7 kg (199 lb 15.3 oz)   08/03/24 117.9 kg (260 lb)   07/18/24 117.9 kg (260 lb)   06/25/24 90.7 kg (200 lb)     Weight Change(s) Since Admission:   Wt Readings from Last 1 Encounters:   10/21/24 1026 69.1 kg (152 lb 5.4 oz)   10/21/24 0634 69.1 kg (152 lb 5.4 oz)   10/20/24 1535 63.5 kg (140 lb)   Admit Weight: 63.5 kg (140 lb) (10/20/24 1535), Weight Method: Estimated    Estimated Needs    Weight Used For Calorie Calculations: 69.1 kg (152 lb 5.4 oz)  Energy Calorie Requirements (kcal): 1886kcal (1.3 stress factor)  Energy Need Method: [place]-St Jeor  Weight Used For Protein Calculations: 69.1 kg (152 lb 5.4 oz)  Protein Requirements: 124-138gm (1.8-2g/kg)  Fluid Requirements (mL): 2073ml (30ml/kg)  CHO Requirement: 210gm (45% est kcal needs)     Enteral Nutrition     Formula: Impact Peptide 1.5 [place]  Rate/Volume: 70ml/hr  Water Flushes: 100ml q2hr  Additives/Modulars: Patel  Route: PEG/J  Method: continuous  Total Nutrition Provided by Tube Feeding, Additives, and Flushes:  Calories Provided  2100 kcal/d, 111% needs   Protein Provided  131 g/d, 100% needs   Fluid Provided  2078 ml/d, 100% needs   Continuous feeding calculations based on estimated 20 hr/d run time unless otherwise stated.    Parenteral Nutrition     Patient not receiving parenteral nutrition support at this time.    Evaluation of Received Nutrient Intake    Calories: meeting estimated needs  Protein: meeting estimated needs    Patient Education     Not applicable.    Nutrition Diagnosis     PES: Inadequate oral intake related to chronic illness as evidenced by trach/G-J tube. (active)     PES: Severe chronic disease or condition related malnutrition related to chronic illness as evidenced by severe fat depletion, severe muscle depletion, and greater than 10% weight loss in 6 months.  (active)    Nutrition Interventions     Intervention(s): collaboration with other providers    Goal: Meet greater than 80% of nutritional needs by follow-up. (goal progressing)  Goal: Tolerate enteral feeding at goal rate by follow-up. (goal progressing)    Nutrition Goals & Monitoring     Dietitian will monitor: energy intake and enteral nutrition intake  Discharge planning: too early to determine; pending clinical course  Nutrition Risk/Follow-Up: high (follow-up in 1-4 days)   Please consult if re-assessment needed sooner.

## 2024-11-07 NOTE — PROGRESS NOTES
[facility] - [facility]  Wound Care    Patient Name:  Delio Daniel Jr.   MRN:  [id number]  Date: 11/7/2024  Diagnosis: UTI (urinary tract infection)    History:     Past Medical History:   Diagnosis Date    Arthritis     Atrial fibrillation     BPH (benign prostatic hyperplasia)     Cardiac arrest     Coronary artery disease     Cyst, kidney, acquired     Diverticulosis     Hyperlipidemia     Hypertension     MI (myocardial infarction)     Obesity     Steatosis of liver     Stroke        Social History     Socioeconomic History    Marital status:     Number of children: 9   Occupational History    Occupation: retired   Tobacco Use    Smoking status: Never    Smokeless tobacco: Never   Substance and Sexual Activity    Alcohol use: Not Currently    Drug use: Not Currently    Sexual activity: Not Currently     Partners: Female     Social Drivers of Health     Financial Resource Strain: Patient Unable To Answer (10/21/2024)    Overall Financial Resource Strain (CARDIA)     Difficulty of Paying Living Expenses: Patient unable to answer   Food Insecurity: Patient Unable To Answer (10/21/2024)    Hunger Vital Sign     Worried About Running Out of Food in the Last Year: Patient unable to answer     Ran Out of Food in the Last Year: Patient unable to answer   Transportation Needs: Patient Unable To Answer (10/21/2024)    TRANSPORTATION NEEDS     Transportation : Patient unable to answer   Physical Activity: Sufficiently Active (8/5/2024)    Exercise Vital Sign     Days of Exercise per Week: 5 days     Minutes of Exercise per Session: 30 min   Stress: Patient Unable To Answer (10/21/2024)    [employer] - Occupational Stress Questionnaire     Feeling of Stress : Patient unable to answer   Housing Stability: Patient Unable To Answer (10/21/2024)    Housing Stability Vital Sign     Unable to Pay for Housing in the Last Year: Patient unable to answer     Homeless in the Last  Year: Patient unable to answer       Precautions:     Allergies as of 10/20/2024    (No Known Allergies)       [facility] Assessment Details/Treatment        11/07/24 1030   WO Assessment   Visit Date 11/07/24   Visit Time 1030   Consult Type Follow Up   [facility] Speciality Wound   [facility] List wound vac   Wound pressure   Intervention chart review;assessed;applied   Teaching on-going        Wound 10/20/24 2100 Pressure Injury Sacral spine   Date First Assessed/Time First Assessed: 10/20/24 2100   Present on Original Admission: Yes  Primary Wound Type: Pressure Injury  Location: Sacral spine  Is this injury device related?: No   Wound Image    Pressure Injury Stage 4   Dressing Appearance Intact;Clean;Dry   Drainage Amount Small   Drainage Characteristics/Odor Serosanguineous   Appearance Pink;Red;Yellow;Slough   Tissue loss description Full thickness   Black (%), Wound Tissue Color 0 %   Red (%), Wound Tissue Color 50 %   Yellow (%), Wound Tissue Color 50 %   Periwound Area Dry;Pink;Scar tissue   Wound Edges Irregular;Jagged   Wound Length (cm) 6 cm   Wound Width (cm) 8 cm   Wound Depth (cm) 2.6 cm   Wound Volume (cm^3) 124.8 cm^3   Wound Surface Area (cm^2) 48 cm^2   Undermining (depth (cm)/location) UM 4-5 oclock @1   Care Cleansed with:;Wound cleanser  (vashe)   Dressing Removed;Applied  (NPWT set@125mmHg)     [facility] follow up for removal and reapplication of wound vac to sacral wound. Wound Care NPMarcello assisted me with wound vac placement along with pt.'s nurseRico. Family at bedside. Pt. Trached, on ventilator. Cleaned sacral wound with vashe. Applied NPWT using 1 piece of versatel, 1 black foam on top of wound tracked to another black foam. Seal intact w/ no leakage or blockage detected.   If wound vac does not hold, please continue previous wound care routine order: Sacrum: clean w/ vashe, apply vashe moistened mesalt to wound bed, cover w/ dry gauze, abd pad, and secure w/ tape. BID/PRN if soilage. Will follow up  next Tuesday for wound vac change w/ wound care NP.     11/07/2024

## 2024-11-08 LAB
ALBUMIN SERPL-MCNC: 1.8 G/DL (ref 3.4–4.8)
ALBUMIN/GLOB SERPL: 0.4 RATIO (ref 1.1–2)
ALP SERPL-CCNC: 82 UNIT/L (ref 40–150)
ALT SERPL-CCNC: 12 UNIT/L (ref 0–55)
ANION GAP SERPL CALC-SCNC: 11 MEQ/L
AST SERPL-CCNC: 21 UNIT/L (ref 5–34)
BASOPHILS # BLD AUTO: 0.08 X10(3)/MCL
BASOPHILS NFR BLD AUTO: 0.5 %
BILIRUB SERPL-MCNC: 1.3 MG/DL
BUN SERPL-MCNC: 19.8 MG/DL (ref 8.4–25.7)
CALCIUM SERPL-MCNC: 8.2 MG/DL (ref 8.8–10)
CHLORIDE SERPL-SCNC: 109 MMOL/L (ref 98–107)
CO2 SERPL-SCNC: 26 MMOL/L (ref 23–31)
CREAT SERPL-MCNC: 0.52 MG/DL (ref 0.72–1.25)
CREAT/UREA NIT SERPL: 38
EOSINOPHIL # BLD AUTO: 0.06 X10(3)/MCL (ref 0–0.9)
EOSINOPHIL NFR BLD AUTO: 0.4 %
ERYTHROCYTE [DISTWIDTH] IN BLOOD BY AUTOMATED COUNT: 20.7 % (ref 11.5–17)
GFR SERPLBLD CREATININE-BSD FMLA CKD-EPI: >60 ML/MIN/1.73/M2
GLOBULIN SER-MCNC: 4.4 GM/DL (ref 2.4–3.5)
GLUCOSE SERPL-MCNC: 96 MG/DL (ref 82–115)
HCT VFR BLD AUTO: 30.8 % (ref 42–52)
HGB BLD-MCNC: 10.2 G/DL (ref 14–18)
IMM GRANULOCYTES # BLD AUTO: 0.24 X10(3)/MCL (ref 0–0.04)
IMM GRANULOCYTES NFR BLD AUTO: 1.5 %
LYMPHOCYTES # BLD AUTO: 2.08 X10(3)/MCL (ref 0.6–4.6)
LYMPHOCYTES NFR BLD AUTO: 12.6 %
MCH RBC QN AUTO: 28.4 PG (ref 27–31)
MCHC RBC AUTO-ENTMCNC: 33.1 G/DL (ref 33–36)
MCV RBC AUTO: 85.8 FL (ref 80–94)
MONOCYTES # BLD AUTO: 1.46 X10(3)/MCL (ref 0.1–1.3)
MONOCYTES NFR BLD AUTO: 8.9 %
NEUTROPHILS # BLD AUTO: 12.56 X10(3)/MCL (ref 2.1–9.2)
NEUTROPHILS NFR BLD AUTO: 76.1 %
NRBC BLD AUTO-RTO: 0.1 %
PLATELET # BLD AUTO: 165 X10(3)/MCL (ref 130–400)
PMV BLD AUTO: 10 FL (ref 7.4–10.4)
POCT GLUCOSE: 119 MG/DL (ref 70–110)
POCT GLUCOSE: 148 MG/DL (ref 70–110)
POTASSIUM SERPL-SCNC: 4 MMOL/L (ref 3.5–5.1)
PROT SERPL-MCNC: 6.2 GM/DL (ref 5.8–7.6)
RBC # BLD AUTO: 3.59 X10(6)/MCL (ref 4.7–6.1)
SODIUM SERPL-SCNC: 146 MMOL/L (ref 136–145)
WBC # BLD AUTO: 16.48 X10(3)/MCL (ref 4.5–11.5)

## 2024-11-08 PROCEDURE — 63600175 PHARM REV CODE 636 W HCPCS: Performed by: GENERAL PRACTICE

## 2024-11-08 PROCEDURE — 20000000 HC ICU ROOM

## 2024-11-08 PROCEDURE — 27000207 HC ISOLATION

## 2024-11-08 PROCEDURE — 25000003 PHARM REV CODE 250

## 2024-11-08 PROCEDURE — 99900035 HC TECH TIME PER 15 MIN (STAT)

## 2024-11-08 PROCEDURE — 94640 AIRWAY INHALATION TREATMENT: CPT

## 2024-11-08 PROCEDURE — 99291 CRITICAL CARE FIRST HOUR: CPT | Mod: ,,, | Performed by: GENERAL PRACTICE

## 2024-11-08 PROCEDURE — 85025 COMPLETE CBC W/AUTO DIFF WBC: CPT

## 2024-11-08 PROCEDURE — 27200966 HC CLOSED SUCTION SYSTEM

## 2024-11-08 PROCEDURE — 87040 BLOOD CULTURE FOR BACTERIA: CPT

## 2024-11-08 PROCEDURE — 94003 VENT MGMT INPAT SUBQ DAY: CPT

## 2024-11-08 PROCEDURE — 99900031 HC PATIENT EDUCATION (STAT)

## 2024-11-08 PROCEDURE — 25000003 PHARM REV CODE 250: Performed by: NURSE PRACTITIONER

## 2024-11-08 PROCEDURE — 36415 COLL VENOUS BLD VENIPUNCTURE: CPT

## 2024-11-08 PROCEDURE — 25000003 PHARM REV CODE 250: Performed by: GENERAL PRACTICE

## 2024-11-08 PROCEDURE — 94761 N-INVAS EAR/PLS OXIMETRY MLT: CPT

## 2024-11-08 PROCEDURE — 99222 1ST HOSP IP/OBS MODERATE 55: CPT | Mod: ,,, | Performed by: INTERNAL MEDICINE

## 2024-11-08 PROCEDURE — 27100171 HC OXYGEN HIGH FLOW UP TO 24 HOURS

## 2024-11-08 PROCEDURE — 25000242 PHARM REV CODE 250 ALT 637 W/ HCPCS: Performed by: INTERNAL MEDICINE

## 2024-11-08 PROCEDURE — 80053 COMPREHEN METABOLIC PANEL: CPT

## 2024-11-08 PROCEDURE — 99900026 HC AIRWAY MAINTENANCE (STAT)

## 2024-11-08 PROCEDURE — 94760 N-INVAS EAR/PLS OXIMETRY 1: CPT

## 2024-11-08 PROCEDURE — 99900022

## 2024-11-08 PROCEDURE — 63600175 PHARM REV CODE 636 W HCPCS

## 2024-11-08 RX ORDER — METRONIDAZOLE 500 MG/1
500 TABLET ORAL EVERY 8 HOURS
Status: DISCONTINUED | OUTPATIENT
Start: 2024-11-08 | End: 2024-11-12

## 2024-11-08 RX ORDER — SULFAMETHOXAZOLE AND TRIMETHOPRIM 800; 160 MG/1; MG/1
1 TABLET ORAL 2 TIMES DAILY
Status: DISCONTINUED | OUTPATIENT
Start: 2024-11-08 | End: 2024-11-18 | Stop reason: HOSPADM

## 2024-11-08 RX ORDER — CIPROFLOXACIN 2 MG/ML
400 INJECTION, SOLUTION INTRAVENOUS
Status: DISCONTINUED | OUTPATIENT
Start: 2024-11-08 | End: 2024-11-11

## 2024-11-08 RX ADMIN — QUETIAPINE FUMARATE 50 MG: 25 TABLET ORAL at 09:11

## 2024-11-08 RX ADMIN — METOPROLOL TARTRATE 25 MG: 25 TABLET, FILM COATED ORAL at 09:11

## 2024-11-08 RX ADMIN — TOBRAMYCIN 300 MG: 300 SOLUTION RESPIRATORY (INHALATION) at 07:11

## 2024-11-08 RX ADMIN — METOPROLOL TARTRATE 25 MG: 25 TABLET, FILM COATED ORAL at 08:11

## 2024-11-08 RX ADMIN — METRONIDAZOLE 500 MG: 500 TABLET ORAL at 09:11

## 2024-11-08 RX ADMIN — PANTOPRAZOLE SODIUM 40 MG: 40 INJECTION, POWDER, LYOPHILIZED, FOR SOLUTION INTRAVENOUS at 08:11

## 2024-11-08 RX ADMIN — CIPROFLOXACIN 400 MG: 2 INJECTION, SOLUTION INTRAVENOUS at 08:11

## 2024-11-08 RX ADMIN — TOBRAMYCIN 300 MG: 300 SOLUTION RESPIRATORY (INHALATION) at 08:11

## 2024-11-08 RX ADMIN — RIVAROXABAN 20 MG: 10 TABLET, FILM COATED ORAL at 09:11

## 2024-11-08 RX ADMIN — Medication 4 ML: at 08:11

## 2024-11-08 RX ADMIN — DIGOXIN 0.25 MG: 125 TABLET ORAL at 08:11

## 2024-11-08 RX ADMIN — QUETIAPINE FUMARATE 50 MG: 25 TABLET ORAL at 08:11

## 2024-11-08 RX ADMIN — SULFAMETHOXAZOLE AND TRIMETHOPRIM 1 TABLET: 800; 160 TABLET ORAL at 09:11

## 2024-11-08 RX ADMIN — AMIODARONE HYDROCHLORIDE 200 MG: 200 TABLET ORAL at 08:11

## 2024-11-08 RX ADMIN — ATORVASTATIN CALCIUM 10 MG: 10 TABLET, FILM COATED ORAL at 08:11

## 2024-11-08 NOTE — PROGRESS NOTES
Infectious Disease  Progress Note    Patient Name: Delio Daniel Jr.   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 On admission, noted to be hypotensive with Afib and RVR, recently had positive urine culture with Klebsiella and Proteus. He also has an advanced sacral ulcer. He was noted to have bacteremia with 2/4 of admission blood cultures being positive for Enterococcus faecium and ultimately noting it is VRE per [facility]. ID consulted for assistance in management.      Bacteremia  VRE bacteremia  Klebsiella bacteremia  CVA with residual cognitive deficits, motor deficits  Tracheostomy and PEG tube dependent  Sacral ulcer   Recurrent UTI  Pacemaker In place  History of V fib arrest     10/25 - afebrile. GOC ongoing. Blood is clear for 72 hrs, continue current regimen for #14 days, ID will sign off. If any changes in plan please call back     11/08:  Patient completed course of meropenem and linezolid on 11/05, started having worsening leukocytosis and had an episode of fever over the past 2 days.  ID reconsulted  Patient has multiple potential sources for infection, most concerning are Pulmonary, sacral ulcer, respiratory cultures noted.  He continues to be on minimal O2 requirements on the vent and does not appear to be in respiratory distress.  He does have a chest x-ray with concern  of new infiltrates    Recommendations:  -CT scan of the chest abdomen and pelvis   -repeat blood cultures   -added susceptibilities to Ceftolozane/Tazobactam, ceftazidime/avibactam, Cefiderocol of both isolates noted in the lungs  -start ciprofloxacin 500 mg IV q.12 hours   -TMP/SMX 1 tablet double strength (or liquid equivalent) per PEG tube q.12 hours  -that being said, the patient's condition has been very concerning since his admission, he has had multiple recurrent admissions with infections due to increasingly resistant organisms and has a very poor baseline functional status since his CVA, his long-term prognosis is very poor with almost certain recurrence of infections with increasingly concerning resistance patterns and LEs unless options for treatment, strongly encouraged continuing with ongoing goals of care conversations with the family    Thank you for your consult. ID will continue following. Please contact us with any questions or concerns.    Portions of this note dictated using EMR integrated voice recognition software, and may be subject to voice recognition errors not corrected at proofreading. Please contact writer for clarification    35 minutes of critical care was time spent personally by me on the following activities: development of treatment plan with patient or surrogate and bedside caregivers, discussions with consultants and or primary team, evaluation of patient's response to treatment, examination of patient, ordering and performing treatments and interventions, ordering and review of laboratory studies, ordering and review of radiographic studies.  This critical care time did not overlap with that of any other provider or involve time for any procedures.      Subjective:     Principal Problem: UTI (urinary tract infection)     Interval History:   No changes to overall clinical condition, remains obtunded and unable to participate in evaluation    Review of Systems   Review of Systems    Unable to perform ROS: Medical condition        Objective:     Vital Signs (Most Recent):  Temp: 99 °F (37.2 °C) (11/08/24 1600)  Pulse: 90 (11/08/24 1700)  Resp: (!) 23 (11/08/24 1700)  BP: 130/83 (11/08/24 1700)  SpO2: 96 % (11/08/24 1700)  Vital Signs (24h Range):  Temp:  [97.8 °F (36.6 °C)-100.8 °F (38.2 °C)] 99 °F (37.2 °C)  Pulse:  [] 90  Resp:  [19-38] 23  SpO2:  [88 %-100 %] 96 %  BP: ()/(56-91) 130/83      Weight:   Wt Readings from Last 1 Encounters:   10/21/24 69.1 kg (152 lb 5.4 oz)      Body mass index is Body mass index is 22.49 kg/m².     Estimated Creatinine Clearance: Estimated Creatinine Clearance: 132.9 mL/min (A) (based on SCr of 0.52 mg/dL (L)).     Lines/Drains/Airways       Drain  Duration                  Rectal Tube 10/25/24 2030 13 days         Gastrostomy/Enterostomy 10/30/24 1200 Gastrostomy-jejunostomy feeding 9 days              Airway  Duration             Adult Surgical Airway 08/19/24 0120 Shiley Extra Large Cuffed Distal 6.0/ 75mm 81 days              Peripheral Intravenous Line  Duration                  Midline Catheter - Single Lumen 10/20/24 1530 Right 19 days         Peripheral IV - Single Lumen 10/20/24 1600 18 G Anterior;Distal;Left Upper Arm 19 days                     Physical Exam  Physical Exam  Constitutional:       Appearance: He is ill-appearing (Chronically ill-appearing).      Comments: Minimally interacting   HENT:      Head: Normocephalic and atraumatic.      Mouth/Throat:      Pharynx: No oropharyngeal exudate or posterior oropharyngeal erythema.   Eyes:      Extraocular Movements: Extraocular movements intact.      Pupils: Pupils are equal, round, and reactive to light.   Cardiovascular:      Rate and Rhythm: Normal rate and regular rhythm.      Heart sounds: No murmur heard.  Pulmonary:      Effort: No respiratory distress.      Breath sounds: No wheezing, rhonchi or rales.      Comments: Tracheostomy in place, ON VENT  Abdominal:      General:  Bowel sounds are normal. There is no distension.      Palpations: Abdomen is soft.      Tenderness: There is no abdominal tenderness.      Comments: Peg tube in place   Genitourinary:     Comments: Fernandez   Musculoskeletal:         General: No swelling or tenderness.      Cervical back: Neck supple. No rigidity or tenderness.      Comments: Deep sacral ulcer ongoing   Lymphadenopathy:      Cervical: No cervical adenopathy.   Skin:     Findings: No lesion or rash.   Neurological:      Mental Status: He is alert.      Comments: At baseline, nonverbal, interaction is minimal but alert and responsive          Significant Labs: CBC:   Recent Labs   Lab 11/07/24 0252 11/08/24 0256   WBC 14.95* 16.48*   HGB 9.8* 10.2*   HCT 29.4* 30.8*    165     CMP:   Recent Labs   Lab 11/07/24 0252 11/08/24 0256    146*   K 3.8 4.0   * 109*   CO2 26 26   BUN 18.5 19.8   CREATININE 0.52* 0.52*   CALCIUM 8.0* 8.2*   ALBUMIN 1.8* 1.8*   BILITOT 1.1 1.3   ALKPHOS 84 82   AST 19 21   ALT 12 12       Microbiology Results (last 7 days)       Procedure Component Value Units Date/Time    Respiratory Culture [[id number]]  (Abnormal)  (Susceptibility) Collected: 11/01/24 1619    Order Status: Completed Specimen: Respiratory Updated: 11/08/24 1328     Respiratory Culture Many ACINETOBACTER BAUMANNII      Many Pseudomonas aeruginosa     GRAM STAIN Quality 2+      Many Gram Negative Rods      Few Yeast    Blood Culture [[id number]] Collected: 11/08/24 0839    Order Status: Resulted Specimen: Blood Updated: 11/08/24 0937    Blood Culture [[id number]] Collected: 11/08/24 0929    Order Status: Resulted Specimen: Blood Updated: 11/08/24 0936    Clostridium Diff Toxin, A & B, EIA [[id number]]  (Normal) Collected: 11/02/24 1730    Order Status: Completed Specimen: Stool Updated: 11/04/24 0652     Clostridium Difficile GDH Antigen Negative     Clostridium Difficile Toxin A/B Negative             Significant Imaging: I have reviewed all  pertinent imaging results/findings within the past 24 hours.      Kenneth Bhardwaj MD  Infectious Disease  [facility]

## 2024-11-08 NOTE — CONSULTS
This note is to fulfill the consult order.  Patient is known to our service from this current admission.  We are being reconsulted due to recurrent fever and concern for ongoing infection.  Please refer to progress note on same day for evaluation and recommendations.    Kenneth Bhardwaj MD  Infectious Disease  [facility]

## 2024-11-08 NOTE — CONSULTS
Patient Name: Delio Daniel Jr.   MRN: [id number]   Admission Date: 10/20/2024   Hospital Length of Stay: 19   Attending Provider: Itz Francisco MD   Consulting Provider: Jace Haney M.D.  Reason for Consult: Goals of Care  Primary Care Physician: Jl Briones MD     Principal Problem: UTI (urinary tract infection)     Patient information was obtained from relative(s) and ER records.      Final diagnoses:  [A41.9] Sepsis  [R50.9] Fever, unspecified fever cause (Primary)  [R00.0] Tachycardia, unspecified  [N39.0] Urinary tract infection without hematuria, site unspecified       We reviewed the patient's current clinical status with the nurse. We reviewed clinical documentation, labs and imaging.       Advance Care Planning     Date: 11/08/2024    [facility]  I engaged the family in a voluntary conversation about advance care planning and we specifically addressed what the goals of care would be moving forward, in light of the patient's change in clinical status, specifically acute hospitalization with severe sepsis with shock secondary to VRE and ESBL Klebsiella bacteremia with questionable urinary tract infection versus pneumonia.  Hospitalization was on 10/20/2024.  The patient at baseline is status post hemorrhagic CVA with left-sided weakness, bed-bound status.  He is status post tracheostomy and PEG tube placement secondary to acute on chronic hypoxic and hypercapnic respiratory failure.  He currently lives at [facility] and is vent dependent.  He has a stage IV ankle wound, stage III knee wound and a unstageable sacral decubitus.  During this admission he underwent bedside debridement on 11/02/2024 and wound VAC placement on 11/04/2024.  He also has a history of recurrent urinary tract infections with multidrug resistant organisms.  He has a history of fatty liver disease, colon cancer status post resection in 2018 and atrial fibrillation, coronary artery disease, STEMI and ventricular  fibrillation arrest as well as second-degree AV block status post pacemaker and ICD placement.  Unfortunately, the patient has developed recurrent fever with leukocytosis.  He has multidrug resistant Acinetobacter and Pseudomonas as well as ESBL Klebsiella on sputum cultures and evidence of new worsening pneumonitis per x-ray today.  Sputum is also felt to be probable airway colonization.  The primary team felt that it was important to consult us to have a goals of care discussion with family.  Patient is currently undergoing further workup for infectious etiology.  Infectious Disease has been reconsulted.    Reviewed LAPOST completed 8/23/24, signed by dtr. Beth  A. Full code, B. Full treatment, C. Long term artificial nutrition by tube.    I contacted the patient's daughter, Beth by phone.  The family had already met with palliative Care on 10/25/2024 and a family meeting occurred with 5 of 9 children present.  I explained the need to review goals of care regarding the patient's change in clinical status and overall general health.  The patient's daughter is in favor of this and stated that she will contact family members and let us know what is a good time to meet.  We encouraged meeting today or Monday.  The patient's daughter did not wish to review specific goals of care decisions as she did not wish to make any decisions independently.  I did explained to her the patient's current clinical status and updated her regarding worsening infectious etiology, leukocytosis, the patient's lethargic state likely indicating ongoing sepsis.     We explored the patient's values and preferences for future care.  The family endorses that what is most important right now is to focus on  to be determined    Accordingly, we have decided that the best plan to meet the patient's goals includes  to be determined based on future family discussion.    A total of 35 min was spent on advance care planning, goals of care discussion,  emotional support, formulating and communicating prognosis and exploring burden/benefit of various approaches of treatment. This discussion occurred on a fully voluntary basis with the verbal consent of the patient and/or family.         Symptom review:    Family felt that patient is comfortable at present.    Assessment and Plan:    Goals of care/ Counseling  VAP  Sepsis  Encephalopathy  MDR acinetobacter, MDR Pseudomonas, ESBL Klebsiella PNA  CAD, AF, h/o VF with pacer/AICD  Complex wounds    We will meet with family to review goals.      History of Present Illness:     69-year-old male with a history of urinary tract infections, atrial fibrillation, coronary artery disease, STEMI, pacemaker with ICD, chronic hypercapnic respiratory failure, fatty liver disease, colon cancer status post resection in 2018, hemorrhagic CVA in 2023 with left-sided deficits, tracheostomy and PEG tube currently living in a nursing home.  The patient also with a history of multidrug resistant urinary tract infection.  He is currently hospitalized with septic shock secondary to VRE, ESBL Klebsiella bacteremia and now currently noted with pneumonia associated with multidrug resistant Acinetobacter, MDR Pseudomonas and ESBL Klebsiella.  He has completed a 7 day course of bold Zyvox and meropenem.  He is currently on tobramycin for the last 2 days.  Infectious Disease has been consulted.  Chest x-ray today reveals evidence of worsening pneumonitis.  We will consulted to review goals of care with the patient and family.      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    Nodule of left lung 07/20/2022    Hypertension 10/05/2022    Hyperlipidemia LDL goal <70 10/05/2022    Hypokalemia 10/05/2022    Coronary artery disease involving native heart without angina pectoris 10/05/2022    Second degree AV block 10/05/2022    Cardiac arrest with ventricular fibrillation 10/05/2022     Cardiac pacemaker in situ 10/05/2022    History of deep vein thrombosis (DVT) of lower extremity 08/08/2023    Current use of long term anticoagulation 08/08/2023    Bilateral lower extremity edema 08/16/2023    Benign prostatic hyperplasia 12/11/2023    Seizure 01/17/2024    Nausea and vomiting 08/10/2024    Esophagitis 08/10/2024    Pneumonia due to infectious organism 08/23/2024     Resolved Ambulatory Problems     Diagnosis Date Noted    Hypertensive urgency 08/09/2023    Atrial fibrillation with rapid ventricular response 08/26/2023    Myocardial infarction 12/11/2023    Stroke 12/19/2023    Acute hypoxemic respiratory failure 01/12/2024    Coffee ground emesis 01/17/2024    Aspiration of gastric contents 07/19/2024    Severe sepsis 07/19/2024    Acute cystitis without hematuria 07/21/2024     Past Medical History:   Diagnosis Date    Arthritis     Atrial fibrillation     BPH (benign prostatic hyperplasia)     Cardiac arrest     Coronary artery disease     Cyst, kidney, acquired     Diverticulosis     Hyperlipidemia     MI (myocardial infarction)     Obesity     Steatosis of liver         Past Surgical History:   Procedure Laterality Date    A-V CARDIAC PACEMAKER INSERTION Right     CARDIAC CATHETERIZATION      COLONOSCOPY W/ BIOPSIES      CRANIECTOMY Right 12/20/2023    Procedure: CRANIECTOMY;  Surgeon: Artem Can MD;  Location: [facility] OR;  Service: Neurosurgery;  Laterality: Right;    ESOPHAGOGASTRODUODENOSCOPY W/ PEG N/A 1/2/2024    Procedure: PEG;  Surgeon: Tani Day MD;  Location: [facility] ENDOSCOPY;  Service: Gastroenterology;  Laterality: N/A;    ESOPHAGOGASTRODUODENOSCOPY W/ PEG N/A 10/30/2024    Procedure: EGD w/ Jtube placement;  Surgeon: Gabriele Salcido MD;  Location: [facility] ENDOSCOPY;  Service: Endoscopy;  Laterality: N/A;  with J tube extension    excision of colon      TRACHEOSTOMY N/A 12/29/2023    Procedure: CREATION, TRACHEOSTOMY;  Surgeon: Patricia Winslow MD;  Location: [facility]  OR;  Service: ENT;  Laterality: N/A;  REQ 1130 //  NEEDS 2 SCRUBS        Review of patient's allergies indicates:  No Known Allergies       Current Facility-Administered Medications:     0.9%  NaCl infusion (for blood administration), , Intravenous, Q24H PRN, Mark Mckee MD    acetaminophen tablet 650 mg, 650 mg, Per G Tube, Q6H PRN, Kadie Rojas DO, 650 mg at 11/01/24 2124    amiodarone tablet 200 mg, 200 mg, Per G Tube, Daily, Leopoldo Mullins, FNP, 200 mg at 11/08/24 0841    atorvastatin tablet 10 mg, 10 mg, Per G Tube, Daily, Kadie Rojas DO, 10 mg at 11/08/24 0841    dextromethorphan-guaiFENesin  mg/5 ml liquid 10 mL, 10 mL, Per G Tube, Q4H PRN, Eugene Zimmerman MD, 10 mL at 11/03/24 0430    dextrose 10% bolus 125 mL 125 mL, 12.5 g, Intravenous, PRN, Kadie Rojas DO, Stopped at 10/30/24 0951    dextrose 10% bolus 250 mL 250 mL, 25 g, Intravenous, PRN, Kadie Rojas DO, Stopped at 10/31/24 0721    digoxin tablet 0.25 mg, 0.25 mg, Per G Tube, Daily, Leopoldo Mullins, FNP, 0.25 mg at 11/08/24 0841    glucagon (human recombinant) injection 1 mg, 1 mg, Intramuscular, PRN, Kadie Rojas DO    hydrALAZINE injection 10 mg, 10 mg, Intravenous, Q4H PRN, Kadie Rojas DO    metoclopramide HCl 5 mg/5 mL solution 10 mg, 10 mg, Oral, Q8H PRN, Gasper Ceballos DO    metoprolol tartrate (LOPRESSOR) tablet 25 mg, 25 mg, Per G Tube, BID, Leopoldo Mullins, FNP, 25 mg at 11/08/24 0841    naloxone 0.4 mg/mL injection 0.02 mg, 0.02 mg, Intravenous, PRN, Kadie Rojas DO    pantoprazole injection 40 mg, 40 mg, Intravenous, Daily, Kadie Rojas DO, 40 mg at 11/08/24 0841    QUEtiapine tablet 50 mg, 50 mg, Per G Tube, BID, Eugene Zimmerman MD, 50 mg at 11/08/24 0841    rivaroxaban tablet 20 mg, 20 mg, Per G Tube, Nightly, Kadie Rojas DO, 20 mg at 11/07/24 2110    sodium chloride 0.9% flush 10 mL, 10 mL, Intravenous, Q12H PRN, Kadie Rojas DO     "sodium chloride 3% nebulizer solution 4 mL, 4 mL, Nebulization, Q8H, Itz Francisco MD, 4 mL at 11/07/24 2314    tobramycin (PF) 300 mg/5 mL nebulizer solution 300 mg, 300 mg, Nebulization, Q12H, Itz Francisco MD, 300 mg at 11/07/24 2033       Current Facility-Administered Medications:     0.9%  NaCl infusion (for blood administration), , Intravenous, Q24H PRN    acetaminophen, 650 mg, Per G Tube, Q6H PRN    dextromethorphan-guaiFENesin  mg/5 ml, 10 mL, Per G Tube, Q4H PRN    dextrose 10%, 12.5 g, Intravenous, PRN    dextrose 10%, 25 g, Intravenous, PRN    glucagon (human recombinant), 1 mg, Intramuscular, PRN    hydrALAZINE, 10 mg, Intravenous, Q4H PRN    metoclopramide HCl, 10 mg, Oral, Q8H PRN    naloxone, 0.02 mg, Intravenous, PRN    sodium chloride 0.9%, 10 mL, Intravenous, Q12H PRN     Family History   Problem Relation Name Age of Onset    Hypertension Mother      Hypertension Father      Hypertension Sister          Review of Systems   Unable to perform ROS: Intubated          12 point review of systems conducted, negative except as stated in the history of present illness. See HPI for details.      Objective:   /77   Pulse 93   Temp 97.8 °F (36.6 °C)   Resp (!) 30   Ht 5' 9.02" (1.753 m)   Wt 69.1 kg (152 lb 5.4 oz)   SpO2 98%   BMI 22.49 kg/m²      Physical Exam  Vitals reviewed.   Constitutional:       Appearance: He is ill-appearing. He is not toxic-appearing.   HENT:      Head: Normocephalic.      Right Ear: External ear normal.      Left Ear: External ear normal.      Nose: Nose normal.      Mouth/Throat:      Mouth: Mucous membranes are moist.      Pharynx: Oropharynx is clear.   Eyes:      Conjunctiva/sclera: Conjunctivae normal.      Pupils: Pupils are equal, round, and reactive to light.   Cardiovascular:      Rate and Rhythm: Normal rate and regular rhythm.      Pulses: Normal pulses.      Heart sounds: Normal heart sounds.   Pulmonary:      Effort: Pulmonary effort is " normal.      Breath sounds: Normal breath sounds.   Abdominal:      General: Abdomen is flat. Bowel sounds are normal.   Musculoskeletal:      Right lower leg: No edema.      Left lower leg: No edema.   Skin:     Findings: Lesion present.   Neurological:      Mental Status: He is disoriented.            Review of Symptoms  Review of Symptoms      Symptom Assessment (ESAS 0-10 Scale)  Unable to complete assessment due to Intubated     CAM / Delirium:  Positive      Pain Assessment in Advanced Demential Scale (PAINAD)   Breathing - Independent of vocalization:  0  Negative vocalization:  0  Facial expression:  0  Body language:  0  Consolability:  0  Total:  0    Performance Status:  10    Living Arrangements:  Lives in nursing home    Psychosocial/Cultural:   See Palliative Psychosocial Note: Yes  9 children, Spoke to Beth, dtr. No living spouse.No POA  **Primary  to Follow**  Palliative Care  Consult: No    Spiritual:  F - Ayanna and Belief:  Sabianism  A - Address in Care:  Fly declined , dtr. Is a       Advance Care Planning   Advance Directives:   [place]: Yes    Medical Power of : No      Decision Making:  Family answered questions and Patient unable to communicate due to disease severity/cognitive impairment  Goals of Care: What is most important right now is to focus on symptom/pain control, quality of life, even if it means sacrificing a little time, extending life as long as possible, even it it means sacrificing quality, curative/life-prolongation (regardless of treatment burdens). Accordingly, we have decided that the best plan to meet the patient's goals includes continuing with treatment.            Caregiver burden formerly assessed: Yes        > 50% of 60 min of encounter was spent in chart review, face to face discussion of goals of care, symptom assessment, coordination of care and emotional support.     Jace Haney M.D.  Palliative  Medicine  [facility]

## 2024-11-08 NOTE — PROGRESS NOTES
Pulmonary & Critical Care Medicine   Progress Note      Presenting History/HPI:    Patient is a 68 y/o male with extensive PMH who presented from NH on 10/20/24 with decreased responsiveness, severe sepsis, and recurrent UTI. 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     Patient transferred to ED from [facility] with lethargy and tachycardia. Family at bedside reports patient is nonverbal at baseline but typically more alert. In the ED, patient is febrile, tachycardic with -190s, tachypneic, /60. EKG shows Afib with RVR. Diltiazem drip started in ED. Labs are significant for WBC of 16.5, lactic acid of 3.3, Cr 1.48, BUN 45.3, Na 155, K+ 5.1, troponin elevated at 0.118. UA with evidence of UTI. Of note, pt was being treated outpatient for a UTI, currently on day 4 of 7 day Rocephin course. Urine culture 10/16/24 with multidrug resistant Klebsiella pneumonia and Proteus mirabilis with susceptibility to Cefepime. Patient received 3L of NS and initiated on Vanc and Cefepime in ED. Admitted to the ICU on mechanical ventilation via trach.    Admitted to the ICU on 10/20   Peg tube extension to J-tube on 10/30    Interval History:  NAEON. Vitals shows patient with two episodes of fever overnight (T-max 100.8°) with persistent tachycardia (HR 80s-100s) with stable saturation on 30% mechanical ventilation. Telemetry continues to show rate controlled atrial fibrillation . CBC showed up trending leukocytosis with left shift and stable normocytic anemia.  CMP showed hypoalbuminemia but otherwise unremarkable. Sputum culture positive for Acinetobacter sensitive only to gentamicin and tobramycin and  Pseudomonas only sensitive to ciprofloxacin gentamicin and tobramycin. Completed treatment with IV linezolid and meropenem. At this point in time patient is likely colonized with acinetobacter and Pseudomonas.  Considering up trending leukocytosis likely redevelopment of pneumonia secondary to above organisms.  Patient has already completed LTAC stay for IV meropenem for bacteremia secondary to above organisms (05/2024).  We will discuss repeat placement at LTAC for repeat treatment however given patient continues to develop repeat infections secondary to likely colonization and patient can not remain on IV meropenem for an indefinite period of time we will also discuss cessation of treatment and discharge back to nursing home.     Scheduled Medications:    amiodarone  200 mg Per G Tube Daily    atorvastatin  10 mg Per G Tube Daily    digoxin  0.25 mg Per G Tube Daily    metoprolol tartrate  25 mg Per G Tube BID    pantoprazole  40 mg Intravenous Daily    QUEtiapine  50 mg Per G Tube BID    rivaroxaban  20 mg Per G Tube Nightly    sodium chloride 3%  4 mL Nebulization Q8H    tobramycin (PF)  300 mg Nebulization Q12H       PRN Medications:     Current Facility-Administered Medications:     0.9%  NaCl infusion (for blood administration), , Intravenous, Q24H PRN    acetaminophen, 650 mg, Per G Tube, Q6H PRN    dextromethorphan-guaiFENesin  mg/5 ml, 10 mL, Per G Tube, Q4H PRN    dextrose 10%, 12.5 g, Intravenous, PRN    dextrose 10%, 25 g, Intravenous, PRN    glucagon (human recombinant), 1 mg, Intramuscular, PRN    hydrALAZINE, 10 mg, Intravenous, Q4H PRN    metoclopramide HCl, 10 mg, Oral, Q8H PRN    naloxone, 0.02 mg, Intravenous, PRN    sodium chloride 0.9%, 10 mL, Intravenous, Q12H PRN      Infusions:          Fluid Balance:     Intake/Output Summary (Last 24 hours) at 11/8/2024 0514  Last data filed at 11/8/2024 0400  Gross per 24 hour   Intake 2546 ml   Output 2720 ml   Net -174 ml         Vital Signs:    Vitals:    11/08/24 0410   BP: 105/65   Pulse: 100   Resp: (!) 30   Temp:          Physical Exam  Vitals and nursing note reviewed.   Constitutional:       Appearance: He is ill-appearing.   HENT:      Head: Normocephalic.      Right Ear: External ear normal.      Left Ear: External ear normal.      Nose: Nose normal.      Mouth/Throat:      Mouth: Mucous membranes are moist.      Pharynx: Oropharynx is clear. No oropharyngeal exudate or posterior oropharyngeal erythema.   Eyes:      General: No scleral icterus.     Extraocular Movements: Extraocular movements intact.      Conjunctiva/sclera: Conjunctivae normal.      Pupils: Pupils are equal, round, and reactive to light.   Neck:      Comments: Tracheostomy in place, site clean and dry  Cardiovascular:      Rate and Rhythm: Normal rate and regular rhythm.      Pulses: Normal pulses.      Heart sounds: Normal heart sounds. No murmur heard.     No friction rub. No gallop.   Pulmonary:      Effort: Pulmonary effort is normal. No respiratory distress.      Breath sounds: Normal breath sounds. No stridor. No wheezing, rhonchi or rales.      Comments: On mechanical ventilation via tracheostomy tube, no dyssynchrony seen on mechanical ventilation, no accessory muscle use   Chest:      Chest wall: No tenderness.   Abdominal:      General: Abdomen is flat. Bowel sounds are normal. There is no distension.      Palpations: Abdomen is soft.      Tenderness: There is no abdominal tenderness. There is no rebound.      Comments: J-tube in place site clean and dry   Musculoskeletal:      Cervical back: Normal range of motion. No rigidity or tenderness.   Skin:     General: Skin is warm and dry.      Capillary Refill: Capillary refill takes less than 2 seconds.      Coloration: Skin is not jaundiced.      Findings: No bruising, erythema or rash.   Neurological:      Mental Status: Mental status is at baseline.      Comments: Nonverbal, opens eyes to loud name call, not moving any  "extremities randomly or on command   Psychiatric:      Comments: Unable to fully assess       Ventilator Settings  Vent Mode: A/C (11/08/24 0134)  Ventilator Initiated: Yes (10/20/24 1546)  Set Rate: 20 BPM (11/08/24 0134)  Vt Set: 500 mL (11/08/24 0134)  PEEP/CPAP: 8 cmH20 (11/08/24 0134)  Oxygen Concentration (%): 35 (11/08/24 0400)  Peak Airway Pressure: 15 cmH20 (11/08/24 0134)  Plateau Pressure: 3 cmH20 (11/01/24 0338)  Total Ve: 14.1 L/m (11/08/24 0134)  F/VT Ratio<105 (RSBI): (!) 73.83 (11/08/24 0134)      Laboratory Studies:   No results for input(s): "PH", "PCO2", "PO2", "HCO3", "POCSATURATED", "BE" in the last 24 hours.  Recent Labs   Lab 11/08/24  0256   WBC 16.48*   RBC 3.59*   HGB 10.2*   HCT 30.8*      MCV 85.8   MCH 28.4   MCHC 33.1     Recent Labs   Lab 11/08/24 0256   GLUCOSE 96   *   K 4.0   *   CO2 26   BUN 19.8   CREATININE 0.52*   CALCIUM 8.2*         Microbiology Data:   Microbiology Results (last 7 days)       Procedure Component Value Units Date/Time    Respiratory Culture [[id number]]  (Abnormal)  (Susceptibility) Collected: 11/01/24 1619    Order Status: Completed Specimen: Respiratory Updated: 11/06/24 1117     Respiratory Culture Many ACINETOBACTER BAUMANNII      Many Pseudomonas aeruginosa     GRAM STAIN Quality 2+      Many Gram Negative Rods      Few Yeast    Clostridium Diff Toxin, A & B, EIA [[id number]]  (Normal) Collected: 11/02/24 1730    Order Status: Completed Specimen: Stool Updated: 11/04/24 0652     Clostridium Difficile GDH Antigen Negative     Clostridium Difficile Toxin A/B Negative              Imaging:   CT Head Without Contrast  Narrative: EXAMINATION:  CT HEAD WITHOUT CONTRAST    CLINICAL HISTORY:  Mental status change, unknown cause;    TECHNIQUE:  Low dose axial images were obtained through the head.  Coronal and sagittal reformations were also performed. Contrast was not administered.    Automatic exposure control was utilized to reduce the " patient's radiation dose.    DLP= 1377    COMPARISON:  07/23/2024    FINDINGS:  No acute intracranial hemorrhage, edema or mass. No acute parenchymal abnormality.    Diffuse encephalomalacia of the right cerebral hemisphere appears grossly unchanged.    Ventriculomegaly is unchanged.    Postsurgical changes of right craniotomy is unchanged.    The mastoid air cells are clear.    The auditory canals are patent bilaterally.    The globes and orbital contents are normal bilaterally.    The visualized maxillary, ethmoid and sphenoid sinuses are clear.  Impression: No acute intracranial abnormality identified.  Extensive remote posttraumatic and postsurgical change noted.  This appears unchanged in the interval.    Electronically signed by: Jg Plunkett  Date:    11/03/2024  Time:    10:25          Assessment and Plan    Assessment:  Sepsis without shock with underlying VRE Enterococcus and ESBL Klebsiella bacteremia with Klebsiella and Proteus pneumonia on 10/20/10/24  -now with Acinetobacter and Pseudomonas positive sputum culture on 11/01/2024 with Acinetobacter sensitive to gentamicin and tobramycin and Pseudomonas sensitive to ciprofloxacin gentamicin and tobramycin, suspect colonization without leukocytosis or increased sputum production or worsening hypoxia   -chronic hypoxemic respiratory failure with tracheostomy on permanent mechanical ventilatory support secondary to prior cerebrovascular accident with subsequent hemorrhagic stroke status post craniectomy   -AFib with RVR   -chronic sacral decubitus ulcer present on admission  -peg tube feed intolerance status post J-tube extension with tolerance of tube feeds  -altered mental status due to the above intracranial process    Plan:  -titrate mechanical ventilation for ARDS net protocol   -supplement oxygen to maintain saturation 94-96%   -routine tracheostomy care   -tube feeds to goal with free water flushes as appropriate   -in AFib on digoxin Lopressor and  amiodarone still ongoing, nothing further to do for AFib   -completed treatment with IV meropenem and linezolid  -now with the above new cultures from sputum although he has no worsening hypoxia no leukocytosis no persistent fever with suspicion this is colonization versus active infection, will hold off on starting further antibiotics   -wound care for sacral decubitus ulcer debrided at bedside on 11/02 with wound VAC placement on 11/04, appreciate assistance from Wound Care and from General surgery    Overall prognosis remains very poor    DVT ppx/tx with Xarelto  GI ppx with protonix  Keep HOB elevated > 30*      Humberot Quintana MD  11/8/2024  Pulmonology/Critical Care

## 2024-11-09 LAB
ALBUMIN SERPL-MCNC: 1.7 G/DL (ref 3.4–4.8)
ALBUMIN/GLOB SERPL: 0.5 RATIO (ref 1.1–2)
ALP SERPL-CCNC: 82 UNIT/L (ref 40–150)
ALT SERPL-CCNC: 17 UNIT/L (ref 0–55)
ANION GAP SERPL CALC-SCNC: 8 MEQ/L
AST SERPL-CCNC: 29 UNIT/L (ref 5–34)
BASOPHILS # BLD AUTO: 0.03 X10(3)/MCL
BASOPHILS NFR BLD AUTO: 0.2 %
BILIRUB SERPL-MCNC: 0.8 MG/DL
BUN SERPL-MCNC: 22 MG/DL (ref 8.4–25.7)
CALCIUM SERPL-MCNC: 7.8 MG/DL (ref 8.8–10)
CHLORIDE SERPL-SCNC: 107 MMOL/L (ref 98–107)
CO2 SERPL-SCNC: 30 MMOL/L (ref 23–31)
CREAT SERPL-MCNC: 0.56 MG/DL (ref 0.72–1.25)
CREAT/UREA NIT SERPL: 39
DIGOXIN SERPL-MCNC: 2.7 NG/ML (ref 0.8–2)
EOSINOPHIL # BLD AUTO: 0.11 X10(3)/MCL (ref 0–0.9)
EOSINOPHIL NFR BLD AUTO: 0.9 %
ERYTHROCYTE [DISTWIDTH] IN BLOOD BY AUTOMATED COUNT: 21.2 % (ref 11.5–17)
GFR SERPLBLD CREATININE-BSD FMLA CKD-EPI: >60 ML/MIN/1.73/M2
GLOBULIN SER-MCNC: 3.6 GM/DL (ref 2.4–3.5)
GLUCOSE SERPL-MCNC: 116 MG/DL (ref 82–115)
HCT VFR BLD AUTO: 28.2 % (ref 42–52)
HGB BLD-MCNC: 9 G/DL (ref 14–18)
IMM GRANULOCYTES # BLD AUTO: 0.1 X10(3)/MCL (ref 0–0.04)
IMM GRANULOCYTES NFR BLD AUTO: 0.8 %
LYMPHOCYTES # BLD AUTO: 1.27 X10(3)/MCL (ref 0.6–4.6)
LYMPHOCYTES NFR BLD AUTO: 10.4 %
MCH RBC QN AUTO: 27.9 PG (ref 27–31)
MCHC RBC AUTO-ENTMCNC: 31.9 G/DL (ref 33–36)
MCV RBC AUTO: 87.3 FL (ref 80–94)
MONOCYTES # BLD AUTO: 0.83 X10(3)/MCL (ref 0.1–1.3)
MONOCYTES NFR BLD AUTO: 6.8 %
NEUTROPHILS # BLD AUTO: 9.88 X10(3)/MCL (ref 2.1–9.2)
NEUTROPHILS NFR BLD AUTO: 80.9 %
NRBC BLD AUTO-RTO: 0 %
PLATELET # BLD AUTO: 222 X10(3)/MCL (ref 130–400)
PMV BLD AUTO: 10.4 FL (ref 7.4–10.4)
POCT GLUCOSE: 121 MG/DL (ref 70–110)
POCT GLUCOSE: 141 MG/DL (ref 70–110)
POTASSIUM SERPL-SCNC: 3.9 MMOL/L (ref 3.5–5.1)
PROT SERPL-MCNC: 5.3 GM/DL (ref 5.8–7.6)
RBC # BLD AUTO: 3.23 X10(6)/MCL (ref 4.7–6.1)
SODIUM SERPL-SCNC: 145 MMOL/L (ref 136–145)
WBC # BLD AUTO: 12.22 X10(3)/MCL (ref 4.5–11.5)

## 2024-11-09 PROCEDURE — 63600175 PHARM REV CODE 636 W HCPCS

## 2024-11-09 PROCEDURE — 27200966 HC CLOSED SUCTION SYSTEM

## 2024-11-09 PROCEDURE — 85025 COMPLETE CBC W/AUTO DIFF WBC: CPT

## 2024-11-09 PROCEDURE — 94640 AIRWAY INHALATION TREATMENT: CPT

## 2024-11-09 PROCEDURE — 25000242 PHARM REV CODE 250 ALT 637 W/ HCPCS: Performed by: INTERNAL MEDICINE

## 2024-11-09 PROCEDURE — 99900022

## 2024-11-09 PROCEDURE — 20000000 HC ICU ROOM

## 2024-11-09 PROCEDURE — 93010 ELECTROCARDIOGRAM REPORT: CPT | Mod: ,,, | Performed by: INTERNAL MEDICINE

## 2024-11-09 PROCEDURE — 93005 ELECTROCARDIOGRAM TRACING: CPT

## 2024-11-09 PROCEDURE — 27100171 HC OXYGEN HIGH FLOW UP TO 24 HOURS

## 2024-11-09 PROCEDURE — 25000003 PHARM REV CODE 250

## 2024-11-09 PROCEDURE — 99900031 HC PATIENT EDUCATION (STAT)

## 2024-11-09 PROCEDURE — 36415 COLL VENOUS BLD VENIPUNCTURE: CPT

## 2024-11-09 PROCEDURE — 27000207 HC ISOLATION

## 2024-11-09 PROCEDURE — 99900026 HC AIRWAY MAINTENANCE (STAT)

## 2024-11-09 PROCEDURE — 80053 COMPREHEN METABOLIC PANEL: CPT

## 2024-11-09 PROCEDURE — 94003 VENT MGMT INPAT SUBQ DAY: CPT

## 2024-11-09 PROCEDURE — 25000003 PHARM REV CODE 250: Performed by: NURSE PRACTITIONER

## 2024-11-09 PROCEDURE — 80162 ASSAY OF DIGOXIN TOTAL: CPT | Performed by: GENERAL PRACTICE

## 2024-11-09 PROCEDURE — 99900035 HC TECH TIME PER 15 MIN (STAT)

## 2024-11-09 PROCEDURE — 63600175 PHARM REV CODE 636 W HCPCS: Performed by: GENERAL PRACTICE

## 2024-11-09 PROCEDURE — 25000003 PHARM REV CODE 250: Performed by: GENERAL PRACTICE

## 2024-11-09 PROCEDURE — 94760 N-INVAS EAR/PLS OXIMETRY 1: CPT

## 2024-11-09 RX ADMIN — RIVAROXABAN 20 MG: 10 TABLET, FILM COATED ORAL at 09:11

## 2024-11-09 RX ADMIN — METRONIDAZOLE 500 MG: 500 TABLET ORAL at 09:11

## 2024-11-09 RX ADMIN — QUETIAPINE FUMARATE 50 MG: 25 TABLET ORAL at 08:11

## 2024-11-09 RX ADMIN — TOBRAMYCIN 300 MG: 300 SOLUTION RESPIRATORY (INHALATION) at 08:11

## 2024-11-09 RX ADMIN — SULFAMETHOXAZOLE AND TRIMETHOPRIM 1 TABLET: 800; 160 TABLET ORAL at 08:11

## 2024-11-09 RX ADMIN — METRONIDAZOLE 500 MG: 500 TABLET ORAL at 05:11

## 2024-11-09 RX ADMIN — Medication 4 ML: at 12:11

## 2024-11-09 RX ADMIN — AMIODARONE HYDROCHLORIDE 200 MG: 200 TABLET ORAL at 08:11

## 2024-11-09 RX ADMIN — CIPROFLOXACIN 400 MG: 2 INJECTION, SOLUTION INTRAVENOUS at 06:11

## 2024-11-09 RX ADMIN — Medication 4 ML: at 08:11

## 2024-11-09 RX ADMIN — TOBRAMYCIN 300 MG: 300 SOLUTION RESPIRATORY (INHALATION) at 07:11

## 2024-11-09 RX ADMIN — QUETIAPINE FUMARATE 50 MG: 25 TABLET ORAL at 09:11

## 2024-11-09 RX ADMIN — ACETAMINOPHEN 650 MG: 325 TABLET ORAL at 09:11

## 2024-11-09 RX ADMIN — DIGOXIN 0.25 MG: 125 TABLET ORAL at 08:11

## 2024-11-09 RX ADMIN — METRONIDAZOLE 500 MG: 500 TABLET ORAL at 02:11

## 2024-11-09 RX ADMIN — METOPROLOL TARTRATE 25 MG: 25 TABLET, FILM COATED ORAL at 08:11

## 2024-11-09 RX ADMIN — ATORVASTATIN CALCIUM 10 MG: 10 TABLET, FILM COATED ORAL at 08:11

## 2024-11-09 RX ADMIN — METOPROLOL TARTRATE 25 MG: 25 TABLET, FILM COATED ORAL at 09:11

## 2024-11-09 RX ADMIN — PANTOPRAZOLE SODIUM 40 MG: 40 INJECTION, POWDER, LYOPHILIZED, FOR SOLUTION INTRAVENOUS at 08:11

## 2024-11-09 RX ADMIN — Medication 4 ML: at 03:11

## 2024-11-09 RX ADMIN — SULFAMETHOXAZOLE AND TRIMETHOPRIM 1 TABLET: 800; 160 TABLET ORAL at 09:11

## 2024-11-09 NOTE — PROGRESS NOTES
Pulmonary & Critical Care Medicine   Progress Note      Presenting History/HPI:    Patient is a 68 y/o male with extensive PMH who presented from NH on 10/20/24 with decreased responsiveness, severe sepsis, and recurrent UTI. 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     Patient transferred to ED from [facility] with lethargy and tachycardia. Family at bedside reports patient is nonverbal at baseline but typically more alert. In the ED, patient is febrile, tachycardic with -190s, tachypneic, /60. EKG shows Afib with RVR. Diltiazem drip started in ED. Labs are significant for WBC of 16.5, lactic acid of 3.3, Cr 1.48, BUN 45.3, Na 155, K+ 5.1, troponin elevated at 0.118. UA with evidence of UTI. Of note, pt was being treated outpatient for a UTI, currently on day 4 of 7 day Rocephin course. Urine culture 10/16/24 with multidrug resistant Klebsiella pneumonia and Proteus mirabilis with susceptibility to Cefepime. Patient received 3L of NS and initiated on Vanc and Cefepime in ED. Admitted to the ICU on mechanical ventilation via trach.    Admitted to the ICU on 10/20   Peg tube extension to J-tube on 10/30    Interval History:  NAEON. Vitals shows patient afebrile overnight with persistent tachycardia (HR 80s-100s) with stable saturation on 35% mechanical ventilation via trach. Telemetry continues to show rate controlled atrial fibrillation. Sputum culture positive for Acinetobacter sensitive only to gentamicin and tobramycin and Pseudomonas only sensitive to ciprofloxacin gentamicin and tobramycin. Completed treatment with IV linezolid and meropenem. At this point in time patient is likely  colonized with acinetobacter and Pseudomonas. CXR obtained due to concern for repeat development of pneumonia given lab work and prior episodes of fever. CXR on personal read showed new left sided infiltrate. Given above, ID re-consulted. Initiated cipro 500 mg bid and ds bactrim bid for repeat pneumonia. Repeat blood cultures pending. Consulted palliative care for assistance with goals of care discussions as patient long term prognosis is poor even with treatment.    Scheduled Medications:    amiodarone  200 mg Per G Tube Daily    atorvastatin  10 mg Per G Tube Daily    ciprofloxacin  400 mg Intravenous Q12H    digoxin  0.25 mg Per G Tube Daily    metoprolol tartrate  25 mg Per G Tube BID    metroNIDAZOLE  500 mg Oral Q8H    pantoprazole  40 mg Intravenous Daily    QUEtiapine  50 mg Per G Tube BID    rivaroxaban  20 mg Per G Tube Nightly    sodium chloride 3%  4 mL Nebulization Q8H    sulfamethoxazole-trimethoprim 800-160mg  1 tablet Oral BID    tobramycin (PF)  300 mg Nebulization Q12H       PRN Medications:     Current Facility-Administered Medications:     0.9%  NaCl infusion (for blood administration), , Intravenous, Q24H PRN    acetaminophen, 650 mg, Per G Tube, Q6H PRN    dextromethorphan-guaiFENesin  mg/5 ml, 10 mL, Per G Tube, Q4H PRN    dextrose 10%, 12.5 g, Intravenous, PRN    dextrose 10%, 25 g, Intravenous, PRN    glucagon (human recombinant), 1 mg, Intramuscular, PRN    hydrALAZINE, 10 mg, Intravenous, Q4H PRN    metoclopramide HCl, 10 mg, Oral, Q8H PRN    naloxone, 0.02 mg, Intravenous, PRN    sodium chloride 0.9%, 10 mL, Intravenous, Q12H PRN      Infusions:          Fluid Balance:     Intake/Output Summary (Last 24 hours) at 11/9/2024 0347  Last data filed at 11/9/2024 0000  Gross per 24 hour   Intake 1718 ml   Output 2205 ml   Net -487 ml         Vital Signs:   Vitals:    11/09/24 0018   BP:    Pulse: 80   Resp: (!) 26   Temp:          Physical Exam  Vitals and nursing note reviewed.    Constitutional:       Appearance: He is ill-appearing.   HENT:      Head: Normocephalic.      Right Ear: External ear normal.      Left Ear: External ear normal.      Nose: Nose normal.      Mouth/Throat:      Mouth: Mucous membranes are moist.      Pharynx: Oropharynx is clear. No oropharyngeal exudate or posterior oropharyngeal erythema.   Eyes:      General: No scleral icterus.     Extraocular Movements: Extraocular movements intact.      Conjunctiva/sclera: Conjunctivae normal.      Pupils: Pupils are equal, round, and reactive to light.   Neck:      Comments: Tracheostomy in place, site clean and dry  Cardiovascular:      Rate and Rhythm: Normal rate and regular rhythm.      Pulses: Normal pulses.      Heart sounds: Normal heart sounds. No murmur heard.     No friction rub. No gallop.   Pulmonary:      Effort: Pulmonary effort is normal. No respiratory distress.      Breath sounds: Normal breath sounds. No stridor. No wheezing, rhonchi or rales.      Comments: On mechanical ventilation via tracheostomy tube, no dyssynchrony seen on mechanical ventilation, no accessory muscle use   Chest:      Chest wall: No tenderness.   Abdominal:      General: Abdomen is flat. Bowel sounds are normal. There is no distension.      Palpations: Abdomen is soft.      Tenderness: There is no abdominal tenderness. There is no rebound.      Comments: J-tube in place site clean and dry   Musculoskeletal:      Cervical back: Normal range of motion. No rigidity or tenderness.   Skin:     General: Skin is warm and dry.      Capillary Refill: Capillary refill takes less than 2 seconds.      Coloration: Skin is not jaundiced.      Findings: No bruising, erythema or rash.   Neurological:      Mental Status: Mental status is at baseline.      Comments: Nonverbal, opens eyes to loud name call, not moving any extremities randomly or on command   Psychiatric:      Comments: Unable to fully assess       Ventilator Settings  Vent Mode: A/C  "(11/09/24 0015)  Ventilator Initiated: Yes (10/20/24 1546)  Set Rate: 20 BPM (11/09/24 0015)  Vt Set: 500 mL (11/09/24 0015)  PEEP/CPAP: 8 cmH20 (11/09/24 0015)  Oxygen Concentration (%): 35 (11/09/24 0015)  Peak Airway Pressure: 13 cmH20 (11/09/24 0015)  Plateau Pressure: 3 cmH20 (11/01/24 0338)  Total Ve: 9.9 L/m (11/09/24 0015)  F/VT Ratio<105 (RSBI): (!) 62.2 (11/09/24 0015)      Laboratory Studies:   No results for input(s): "PH", "PCO2", "PO2", "HCO3", "POCSATURATED", "BE" in the last 24 hours.  No results for input(s): "WBC", "RBC", "HGB", "HCT", "PLT", "MCV", "MCH", "MCHC" in the last 24 hours.    No results for input(s): "GLUCOSE", "NA", "K", "CL", "CO2", "BUN", "CREATININE", "CALCIUM", "MG" in the last 24 hours.        Microbiology Data:   Microbiology Results (last 7 days)       Procedure Component Value Units Date/Time    Respiratory Culture [[id number]]  (Abnormal)  (Susceptibility) Collected: 11/01/24 1619    Order Status: Completed Specimen: Respiratory Updated: 11/08/24 1328     Respiratory Culture Many ACINETOBACTER BAUMANNII      Many Pseudomonas aeruginosa     GRAM STAIN Quality 2+      Many Gram Negative Rods      Few Yeast    Blood Culture [[id number]] Collected: 11/08/24 0839    Order Status: Resulted Specimen: Blood Updated: 11/08/24 0937    Blood Culture [[id number]] Collected: 11/08/24 0929    Order Status: Resulted Specimen: Blood Updated: 11/08/24 0936    Clostridium Diff Toxin, A & B, EIA [[id number]]  (Normal) Collected: 11/02/24 1730    Order Status: Completed Specimen: Stool Updated: 11/04/24 0652     Clostridium Difficile GDH Antigen Negative     Clostridium Difficile Toxin A/B Negative              Imaging:   X-Ray Chest 1 View  Narrative: EXAMINATION:  XR CHEST 1 VIEW    CLINICAL HISTORY:  Pneumonia;    TECHNIQUE:  Single view of the chest    COMPARISON:  10/25/2024    FINDINGS:  The cardiomediastinal silhouette remains prominent.  Interval development of right upper lobe and left " hilar opacifications.  Recommend continued follow-up.  Impression: Findings concerning for development of infectious process.  Recommend continued follow-up.    Electronically signed by: Jg Plunkett  Date:    11/08/2024  Time:    09:41          Assessment and Plan    Assessment:  Sepsis without shock with underlying VRE Enterococcus and ESBL Klebsiella bacteremia with Klebsiella and Proteus pneumonia on 10/20/10/24  -now with Acinetobacter and Pseudomonas positive sputum culture on 11/01/2024 with Acinetobacter sensitive to gentamicin and tobramycin and Pseudomonas sensitive to ciprofloxacin gentamicin and tobramycin, suspect colonization without leukocytosis or increased sputum production or worsening hypoxia  -chronic hypoxemic respiratory failure with tracheostomy on permanent mechanical ventilatory support secondary to prior cerebrovascular accident with subsequent hemorrhagic stroke status post craniectomy   -AFib with RVR (rate controlled)  -chronic sacral decubitus ulcer present on admission  -peg tube feed intolerance status post J-tube extension with tolerance of tube feeds  -altered mental status due to the above intracranial process    Plan:  -titrate mechanical ventilation for ARDS net protocol   -supplement oxygen to maintain saturation 94-96%   -routine tracheostomy care   -tube feeds to goal with free water flushes as appropriate   -continues on digoxin, Lopressor, and amiodarone; remains in afib, nothing further to do for Afib  -initiated cipro 500 mg bid and ds bactrim bid for repeat pneumonia noted on CXR yesterday  -wound care for sacral decubitus ulcer debrided at bedside on 11/02 with wound VAC placement on 11/04, appreciate assistance from Wound Care and from General surgery    Overall prognosis remains poor    DVT ppx/tx with Xarelto  GI ppx with protonix  Keep HOB elevated > 30*    32 minutes of critical care was time spent personally by me on the following activities: development of  treatment plan with patient or surrogate and bedside caregivers, discussions with consultants, evaluation of patient's response to treatment, examination of patient, ordering and performing treatments and interventions, ordering and review of laboratory studies, ordering and review of radiographic studies, pulse oximetry, re-evaluation of patient's condition.  This critical care time did not overlap with that of any other provider or involve time for any procedures.     Humberto Quintana MD  11/9/2024  Pulmonology/Critical Care

## 2024-11-10 LAB
ALBUMIN SERPL-MCNC: 1.7 G/DL (ref 3.4–4.8)
ALBUMIN/GLOB SERPL: 0.4 RATIO (ref 1.1–2)
ALP SERPL-CCNC: 78 UNIT/L (ref 40–150)
ALT SERPL-CCNC: 18 UNIT/L (ref 0–55)
ANION GAP SERPL CALC-SCNC: 8 MEQ/L
AST SERPL-CCNC: 25 UNIT/L (ref 5–34)
BASOPHILS # BLD AUTO: 0.07 X10(3)/MCL
BASOPHILS NFR BLD AUTO: 0.6 %
BILIRUB SERPL-MCNC: 0.6 MG/DL
BUN SERPL-MCNC: 21.9 MG/DL (ref 8.4–25.7)
CALCIUM SERPL-MCNC: 8.2 MG/DL (ref 8.8–10)
CHLORIDE SERPL-SCNC: 106 MMOL/L (ref 98–107)
CO2 SERPL-SCNC: 30 MMOL/L (ref 23–31)
CREAT SERPL-MCNC: 0.55 MG/DL (ref 0.72–1.25)
CREAT/UREA NIT SERPL: 40
EOSINOPHIL # BLD AUTO: 0.22 X10(3)/MCL (ref 0–0.9)
EOSINOPHIL NFR BLD AUTO: 2 %
ERYTHROCYTE [DISTWIDTH] IN BLOOD BY AUTOMATED COUNT: 21.5 % (ref 11.5–17)
GFR SERPLBLD CREATININE-BSD FMLA CKD-EPI: >60 ML/MIN/1.73/M2
GLOBULIN SER-MCNC: 4 GM/DL (ref 2.4–3.5)
GLUCOSE SERPL-MCNC: 100 MG/DL (ref 82–115)
HCT VFR BLD AUTO: 31.7 % (ref 42–52)
HGB BLD-MCNC: 9.5 G/DL (ref 14–18)
IMM GRANULOCYTES # BLD AUTO: 0.12 X10(3)/MCL (ref 0–0.04)
IMM GRANULOCYTES NFR BLD AUTO: 1.1 %
LYMPHOCYTES # BLD AUTO: 1.53 X10(3)/MCL (ref 0.6–4.6)
LYMPHOCYTES NFR BLD AUTO: 13.8 %
MCH RBC QN AUTO: 28 PG (ref 27–31)
MCHC RBC AUTO-ENTMCNC: 30 G/DL (ref 33–36)
MCV RBC AUTO: 93.5 FL (ref 80–94)
MONOCYTES # BLD AUTO: 1.63 X10(3)/MCL (ref 0.1–1.3)
MONOCYTES NFR BLD AUTO: 14.7 %
NEUTROPHILS # BLD AUTO: 7.51 X10(3)/MCL (ref 2.1–9.2)
NEUTROPHILS NFR BLD AUTO: 67.8 %
NRBC BLD AUTO-RTO: 0.3 %
OHS QRS DURATION: 86 MS
OHS QTC CALCULATION: 374 MS
PLATELET # BLD AUTO: 204 X10(3)/MCL (ref 130–400)
PMV BLD AUTO: 11 FL (ref 7.4–10.4)
POCT GLUCOSE: 100 MG/DL (ref 70–110)
POCT GLUCOSE: 109 MG/DL (ref 70–110)
POCT GLUCOSE: 97 MG/DL (ref 70–110)
POTASSIUM SERPL-SCNC: 3.8 MMOL/L (ref 3.5–5.1)
PROT SERPL-MCNC: 5.7 GM/DL (ref 5.8–7.6)
RBC # BLD AUTO: 3.39 X10(6)/MCL (ref 4.7–6.1)
SODIUM SERPL-SCNC: 144 MMOL/L (ref 136–145)
WBC # BLD AUTO: 11.08 X10(3)/MCL (ref 4.5–11.5)

## 2024-11-10 PROCEDURE — 63600175 PHARM REV CODE 636 W HCPCS

## 2024-11-10 PROCEDURE — 27100171 HC OXYGEN HIGH FLOW UP TO 24 HOURS

## 2024-11-10 PROCEDURE — 27200966 HC CLOSED SUCTION SYSTEM

## 2024-11-10 PROCEDURE — 99900022

## 2024-11-10 PROCEDURE — 94761 N-INVAS EAR/PLS OXIMETRY MLT: CPT

## 2024-11-10 PROCEDURE — 94760 N-INVAS EAR/PLS OXIMETRY 1: CPT

## 2024-11-10 PROCEDURE — 94003 VENT MGMT INPAT SUBQ DAY: CPT

## 2024-11-10 PROCEDURE — 80053 COMPREHEN METABOLIC PANEL: CPT

## 2024-11-10 PROCEDURE — 27000207 HC ISOLATION

## 2024-11-10 PROCEDURE — 25000003 PHARM REV CODE 250: Performed by: GENERAL PRACTICE

## 2024-11-10 PROCEDURE — 63600175 PHARM REV CODE 636 W HCPCS: Performed by: GENERAL PRACTICE

## 2024-11-10 PROCEDURE — 94640 AIRWAY INHALATION TREATMENT: CPT

## 2024-11-10 PROCEDURE — 36415 COLL VENOUS BLD VENIPUNCTURE: CPT

## 2024-11-10 PROCEDURE — 99900026 HC AIRWAY MAINTENANCE (STAT)

## 2024-11-10 PROCEDURE — 25000003 PHARM REV CODE 250: Performed by: NURSE PRACTITIONER

## 2024-11-10 PROCEDURE — 20000000 HC ICU ROOM

## 2024-11-10 PROCEDURE — 25000242 PHARM REV CODE 250 ALT 637 W/ HCPCS: Performed by: INTERNAL MEDICINE

## 2024-11-10 PROCEDURE — 85025 COMPLETE CBC W/AUTO DIFF WBC: CPT

## 2024-11-10 PROCEDURE — 99900035 HC TECH TIME PER 15 MIN (STAT)

## 2024-11-10 PROCEDURE — 25000003 PHARM REV CODE 250

## 2024-11-10 PROCEDURE — 99900031 HC PATIENT EDUCATION (STAT)

## 2024-11-10 RX ADMIN — METRONIDAZOLE 500 MG: 500 TABLET ORAL at 08:11

## 2024-11-10 RX ADMIN — Medication 4 ML: at 12:11

## 2024-11-10 RX ADMIN — ATORVASTATIN CALCIUM 10 MG: 10 TABLET, FILM COATED ORAL at 08:11

## 2024-11-10 RX ADMIN — DIGOXIN 0.25 MG: 125 TABLET ORAL at 08:11

## 2024-11-10 RX ADMIN — Medication 4 ML: at 08:11

## 2024-11-10 RX ADMIN — METRONIDAZOLE 500 MG: 500 TABLET ORAL at 02:11

## 2024-11-10 RX ADMIN — METOPROLOL TARTRATE 25 MG: 25 TABLET, FILM COATED ORAL at 08:11

## 2024-11-10 RX ADMIN — QUETIAPINE FUMARATE 50 MG: 25 TABLET ORAL at 08:11

## 2024-11-10 RX ADMIN — METRONIDAZOLE 500 MG: 500 TABLET ORAL at 05:11

## 2024-11-10 RX ADMIN — SULFAMETHOXAZOLE AND TRIMETHOPRIM 1 TABLET: 800; 160 TABLET ORAL at 08:11

## 2024-11-10 RX ADMIN — AMIODARONE HYDROCHLORIDE 200 MG: 200 TABLET ORAL at 08:11

## 2024-11-10 RX ADMIN — TOBRAMYCIN 300 MG: 300 SOLUTION RESPIRATORY (INHALATION) at 08:11

## 2024-11-10 RX ADMIN — Medication 4 ML: at 04:11

## 2024-11-10 RX ADMIN — Medication 4 ML: at 11:11

## 2024-11-10 RX ADMIN — CIPROFLOXACIN 400 MG: 2 INJECTION, SOLUTION INTRAVENOUS at 06:11

## 2024-11-10 RX ADMIN — PANTOPRAZOLE SODIUM 40 MG: 40 INJECTION, POWDER, LYOPHILIZED, FOR SOLUTION INTRAVENOUS at 08:11

## 2024-11-10 RX ADMIN — RIVAROXABAN 20 MG: 10 TABLET, FILM COATED ORAL at 08:11

## 2024-11-10 NOTE — PROGRESS NOTES
Pulmonary & Critical Care Medicine   Progress Note      Presenting History/HPI:    Patient is a 70 y/o male with extensive PMH who presented from NH on 10/20/24 with decreased responsiveness, severe sepsis, and recurrent UTI. 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     Patient transferred to ED from [facility] with lethargy and tachycardia. Family at bedside reports patient is nonverbal at baseline but typically more alert. In the ED, patient is febrile, tachycardic with -190s, tachypneic, /60. EKG shows Afib with RVR. Diltiazem drip started in ED. Labs are significant for WBC of 16.5, lactic acid of 3.3, Cr 1.48, BUN 45.3, Na 155, K+ 5.1, troponin elevated at 0.118. UA with evidence of UTI. Of note, pt was being treated outpatient for a UTI, currently on day 4 of 7 day Rocephin course. Urine culture 10/16/24 with multidrug resistant Klebsiella pneumonia and Proteus mirabilis with susceptibility to Cefepime. Patient received 3L of NS and initiated on Vanc and Cefepime in ED. Admitted to the ICU on mechanical ventilation via trach.    Admitted to the ICU on 10/20   Peg tube extension to J-tube on 10/30    Interval History:  NAEON. Vitals shows patient afebrile over past 24 hours with stable saturation on 35% mechanical ventilation via trach. Urine output 1225 cc over past 24 hours. Telemetry continues to show rate controlled atrial fibrillation. Original sputum culture positive for Acinetobacter baumannii  sensitive only to gentamicin and tobramycin and Pseudomonas aeruginosa only sensitive to ciprofloxacin gentamicin and tobramycin. Original blood cultures negative. Completed treatment with IV  linezolid and meropenem. At this point in time patient is likely colonized with acinetobacter baumannii and Pseudomonas aeruginosa. Patient on HOD 19 developed repeat episode of fever and uptrending leukocytosis. CXR obtained due to concern for repeat development of pneumonia given lab work and prior episodes of fever. CXR on personal read showed new left sided infiltrate. Given above, ID re-consulted. Initiated cipro 500 mg bid, ds bactrim bid, and metronidazole 500 mg tid for repeat pneumonia which he continues on at this time. Repeat blood cultures NTD. Consulted palliative care for assistance with goals of care discussions as patient long term prognosis is poor even with treatment. Family with plans to discuss care plan Monday.    Scheduled Medications:    amiodarone  200 mg Per G Tube Daily    atorvastatin  10 mg Per G Tube Daily    ciprofloxacin  400 mg Intravenous Q12H    digoxin  0.25 mg Per G Tube Daily    metoprolol tartrate  25 mg Per G Tube BID    metroNIDAZOLE  500 mg Oral Q8H    pantoprazole  40 mg Intravenous Daily    QUEtiapine  50 mg Per G Tube BID    rivaroxaban  20 mg Per G Tube Nightly    sodium chloride 3%  4 mL Nebulization Q8H    sulfamethoxazole-trimethoprim 800-160mg  1 tablet Oral BID    tobramycin (PF)  300 mg Nebulization Q12H       PRN Medications:     Current Facility-Administered Medications:     0.9%  NaCl infusion (for blood administration), , Intravenous, Q24H PRN    acetaminophen, 650 mg, Per G Tube, Q6H PRN    dextromethorphan-guaiFENesin  mg/5 ml, 10 mL, Per G Tube, Q4H PRN    dextrose 10%, 12.5 g, Intravenous, PRN    dextrose 10%, 25 g, Intravenous, PRN    glucagon (human recombinant), 1 mg, Intramuscular, PRN    hydrALAZINE, 10 mg, Intravenous, Q4H PRN    metoclopramide HCl, 10 mg, Oral, Q8H PRN    naloxone, 0.02 mg, Intravenous, PRN    sodium chloride 0.9%, 10 mL, Intravenous, Q12H PRN      Infusions:          Fluid Balance:     Intake/Output Summary (Last 24 hours) at  11/10/2024 0519  Last data filed at 11/10/2024 0400  Gross per 24 hour   Intake 3556.35 ml   Output 2125 ml   Net 1431.35 ml         Vital Signs:   Vitals:    11/10/24 0400   BP: 134/77   Pulse: 84   Resp: (!) 0   Temp: 99.1 °F (37.3 °C)         Physical Exam  Vitals and nursing note reviewed.   Constitutional:       Appearance: He is ill-appearing.   HENT:      Head: Normocephalic.      Right Ear: External ear normal.      Left Ear: External ear normal.      Nose: Nose normal.      Mouth/Throat:      Mouth: Mucous membranes are moist.      Pharynx: Oropharynx is clear. No oropharyngeal exudate or posterior oropharyngeal erythema.   Eyes:      General: No scleral icterus.     Extraocular Movements: Extraocular movements intact.      Conjunctiva/sclera: Conjunctivae normal.      Pupils: Pupils are equal, round, and reactive to light.   Neck:      Comments: Tracheostomy in place, site clean and dry  Cardiovascular:      Rate and Rhythm: Normal rate and regular rhythm.      Pulses: Normal pulses.      Heart sounds: Normal heart sounds. No murmur heard.     No friction rub. No gallop.   Pulmonary:      Effort: Pulmonary effort is normal. No respiratory distress.      Breath sounds: Normal breath sounds. No stridor. No wheezing, rhonchi or rales.      Comments: On mechanical ventilation via tracheostomy tube, no dyssynchrony seen on mechanical ventilation, no accessory muscle use   Chest:      Chest wall: No tenderness.   Abdominal:      General: Abdomen is flat. Bowel sounds are normal. There is no distension.      Palpations: Abdomen is soft.      Tenderness: There is no abdominal tenderness. There is no rebound.      Comments: J-tube in place site clean and dry   Musculoskeletal:      Cervical back: Normal range of motion. No rigidity or tenderness.   Skin:     General: Skin is warm and dry.      Capillary Refill: Capillary refill takes less than 2 seconds.      Coloration: Skin is not jaundiced.      Findings: No  "bruising, erythema or rash.   Neurological:      Mental Status: Mental status is at baseline.      Comments: Nonverbal, opens eyes to loud name call, not moving any extremities randomly or on command   Psychiatric:      Comments: Unable to fully assess       Ventilator Settings  Vent Mode: A/C (11/10/24 0026)  Ventilator Initiated: Yes (10/20/24 1546)  Set Rate: 20 BPM (11/10/24 0026)  Vt Set: 500 mL (11/10/24 0026)  PEEP/CPAP: 8 cmH20 (11/10/24 0026)  Oxygen Concentration (%): 35 (11/10/24 0400)  Peak Airway Pressure: 14 cmH20 (11/10/24 0026)  Plateau Pressure: 3 cmH20 (11/01/24 0338)  Total Ve: 8.8 L/m (11/10/24 0026)  F/VT Ratio<105 (RSBI): (!) 54.63 (11/10/24 0026)      Laboratory Studies:   No results for input(s): "PH", "PCO2", "PO2", "HCO3", "POCSATURATED", "BE" in the last 24 hours.  Recent Labs   Lab 11/10/24  0251   WBC 11.08   RBC 3.39*   HGB 9.5*   HCT 31.7*      MCV 93.5   MCH 28.0   MCHC 30.0*       Recent Labs   Lab 11/10/24  0251   GLUCOSE 100      K 3.8      CO2 30   BUN 21.9   CREATININE 0.55*   CALCIUM 8.2*           Microbiology Data:   Microbiology Results (last 7 days)       Procedure Component Value Units Date/Time    Blood Culture [[id number]]  (Normal) Collected: 11/08/24 0839    Order Status: Completed Specimen: Blood Updated: 11/09/24 1102     Blood Culture No Growth At 24 Hours    Blood Culture [[id number]]  (Normal) Collected: 11/08/24 0929    Order Status: Completed Specimen: Blood Updated: 11/09/24 1102     Blood Culture No Growth At 24 Hours    Respiratory Culture [[id number]]  (Abnormal)  (Susceptibility) Collected: 11/01/24 1619    Order Status: Completed Specimen: Respiratory Updated: 11/08/24 1328     Respiratory Culture Many ACINETOBACTER BAUMANNII      Many Pseudomonas aeruginosa     GRAM STAIN Quality 2+      Many Gram Negative Rods      Few Yeast    Clostridium Diff Toxin, A & B, EIA [[id number]]  (Normal) Collected: 11/02/24 4007    Order Status: Completed " Specimen: Stool Updated: 11/04/24 0652     Clostridium Difficile GDH Antigen Negative     Clostridium Difficile Toxin A/B Negative              Imaging:   X-Ray Chest 1 View  Narrative: EXAMINATION:  XR CHEST 1 VIEW    CLINICAL HISTORY:  Pneumonia;    TECHNIQUE:  Single view of the chest    COMPARISON:  10/25/2024    FINDINGS:  The cardiomediastinal silhouette remains prominent.  Interval development of right upper lobe and left hilar opacifications.  Recommend continued follow-up.  Impression: Findings concerning for development of infectious process.  Recommend continued follow-up.    Electronically signed by: Jg Plunkett  Date:    11/08/2024  Time:    09:41          Assessment and Plan    Assessment:  Sepsis without shock with underlying VRE Enterococcus and ESBL Klebsiella bacteremia with Klebsiella and Proteus pneumonia on 10/20/10/24 (resolved)  -Acinetobacter and Pseudomonas positive sputum culture on 11/01/2024 with Acinetobacter sensitive to gentamicin and tobramycin and Pseudomonas sensitive to ciprofloxacin gentamicin and tobramycin, suspect colonization without leukocytosis or increased sputum production or worsening hypoxia  Repeat pneumonia without sepsis and/or septic shock (11/08/2024)  -CXR on personal read showed new left sided infiltrate.   -blood cultures NTD  Chronic hypoxemic respiratory failure with tracheostomy on permanent mechanical ventilatory support secondary to prior cerebrovascular accident with subsequent hemorrhagic stroke status post craniectomy   AFib with RVR (rate controlled)  Chronic sacral decubitus ulcer present on admission  -PEG tube feed intolerance status post J-tube extension with tolerance of tube feeds  Altered mental status due to the above intracranial process    Plan:  -titrate mechanical ventilation for ARDS net protocol   -supplement oxygen to maintain saturation 94-96%   -routine tracheostomy care   -tube feeds to goal with free water flushes as appropriate    -continues on digoxin, Lopressor, and amiodarone; remains in afib, nothing further to do for Afib  -continue cipro 500 mg bid, ds bactrim bid, and flagyl 500 mg tid for repeat pneumonia  -wound care for sacral decubitus ulcer debrided at bedside on 11/02 with wound VAC placement on 11/04, appreciate assistance from Wound Care and from General surgery  -palliative care consulted for goals of care discussion with family as overall prognosis remains poor; family meeting planned for Monday    DVT ppx/tx with Xarelto  GI ppx with protonix  Keep HOB elevated > 30*    32 minutes of critical care was time spent personally by me on the following activities: development of treatment plan with patient or surrogate and bedside caregivers, discussions with consultants, evaluation of patient's response to treatment, examination of patient, ordering and performing treatments and interventions, ordering and review of laboratory studies, ordering and review of radiographic studies, pulse oximetry, re-evaluation of patient's condition.  This critical care time did not overlap with that of any other provider or involve time for any procedures.     Humberto Quintana MD  11/10/2024  Pulmonology/Critical Care

## 2024-11-11 LAB
ALBUMIN SERPL-MCNC: 1.7 G/DL (ref 3.4–4.8)
ALBUMIN/GLOB SERPL: 0.4 RATIO (ref 1.1–2)
ALP SERPL-CCNC: 73 UNIT/L (ref 40–150)
ALT SERPL-CCNC: 15 UNIT/L (ref 0–55)
ANION GAP SERPL CALC-SCNC: 4 MEQ/L
AST SERPL-CCNC: 18 UNIT/L (ref 5–34)
BASOPHILS # BLD AUTO: 0.04 X10(3)/MCL
BASOPHILS NFR BLD AUTO: 0.5 %
BILIRUB SERPL-MCNC: 0.5 MG/DL
BUN SERPL-MCNC: 19.8 MG/DL (ref 8.4–25.7)
CALCIUM SERPL-MCNC: 8.4 MG/DL (ref 8.8–10)
CHLORIDE SERPL-SCNC: 107 MMOL/L (ref 98–107)
CO2 SERPL-SCNC: 32 MMOL/L (ref 23–31)
CREAT SERPL-MCNC: 0.51 MG/DL (ref 0.72–1.25)
CREAT/UREA NIT SERPL: 39
DIGOXIN SERPL-MCNC: 1 NG/ML (ref 0.8–2)
EOSINOPHIL # BLD AUTO: 0.21 X10(3)/MCL (ref 0–0.9)
EOSINOPHIL NFR BLD AUTO: 2.4 %
ERYTHROCYTE [DISTWIDTH] IN BLOOD BY AUTOMATED COUNT: 21.3 % (ref 11.5–17)
GFR SERPLBLD CREATININE-BSD FMLA CKD-EPI: >60 ML/MIN/1.73/M2
GLOBULIN SER-MCNC: 4.1 GM/DL (ref 2.4–3.5)
GLUCOSE SERPL-MCNC: 104 MG/DL (ref 82–115)
HCT VFR BLD AUTO: 26.8 % (ref 42–52)
HGB BLD-MCNC: 8.4 G/DL (ref 14–18)
IMM GRANULOCYTES # BLD AUTO: 0.12 X10(3)/MCL (ref 0–0.04)
IMM GRANULOCYTES NFR BLD AUTO: 1.4 %
LYMPHOCYTES # BLD AUTO: 1.2 X10(3)/MCL (ref 0.6–4.6)
LYMPHOCYTES NFR BLD AUTO: 13.6 %
MCH RBC QN AUTO: 27.9 PG (ref 27–31)
MCHC RBC AUTO-ENTMCNC: 31.3 G/DL (ref 33–36)
MCV RBC AUTO: 89 FL (ref 80–94)
MONOCYTES # BLD AUTO: 0.77 X10(3)/MCL (ref 0.1–1.3)
MONOCYTES NFR BLD AUTO: 8.7 %
NEUTROPHILS # BLD AUTO: 6.47 X10(3)/MCL (ref 2.1–9.2)
NEUTROPHILS NFR BLD AUTO: 73.4 %
NRBC BLD AUTO-RTO: 0.2 %
PLATELET # BLD AUTO: 273 X10(3)/MCL (ref 130–400)
PMV BLD AUTO: 9.7 FL (ref 7.4–10.4)
POCT GLUCOSE: 110 MG/DL (ref 70–110)
POCT GLUCOSE: 88 MG/DL (ref 70–110)
POCT GLUCOSE: 95 MG/DL (ref 70–110)
POTASSIUM SERPL-SCNC: 4.1 MMOL/L (ref 3.5–5.1)
PROT SERPL-MCNC: 5.8 GM/DL (ref 5.8–7.6)
RBC # BLD AUTO: 3.01 X10(6)/MCL (ref 4.7–6.1)
SODIUM SERPL-SCNC: 143 MMOL/L (ref 136–145)
WBC # BLD AUTO: 8.81 X10(3)/MCL (ref 4.5–11.5)

## 2024-11-11 PROCEDURE — 85025 COMPLETE CBC W/AUTO DIFF WBC: CPT

## 2024-11-11 PROCEDURE — 36415 COLL VENOUS BLD VENIPUNCTURE: CPT

## 2024-11-11 PROCEDURE — 94667 MNPJ CHEST WALL 1ST: CPT

## 2024-11-11 PROCEDURE — 99900035 HC TECH TIME PER 15 MIN (STAT)

## 2024-11-11 PROCEDURE — 25000003 PHARM REV CODE 250: Performed by: GENERAL PRACTICE

## 2024-11-11 PROCEDURE — 25000003 PHARM REV CODE 250

## 2024-11-11 PROCEDURE — 25000242 PHARM REV CODE 250 ALT 637 W/ HCPCS: Performed by: INTERNAL MEDICINE

## 2024-11-11 PROCEDURE — 25000003 PHARM REV CODE 250: Performed by: NURSE PRACTITIONER

## 2024-11-11 PROCEDURE — 99900026 HC AIRWAY MAINTENANCE (STAT)

## 2024-11-11 PROCEDURE — 94640 AIRWAY INHALATION TREATMENT: CPT

## 2024-11-11 PROCEDURE — 99900031 HC PATIENT EDUCATION (STAT)

## 2024-11-11 PROCEDURE — 99497 ADVNCD CARE PLAN 30 MIN: CPT | Mod: ,,, | Performed by: INTERNAL MEDICINE

## 2024-11-11 PROCEDURE — 25000003 PHARM REV CODE 250: Performed by: HOSPITALIST

## 2024-11-11 PROCEDURE — 99233 SBSQ HOSP IP/OBS HIGH 50: CPT | Mod: ,,, | Performed by: HOSPITALIST

## 2024-11-11 PROCEDURE — 94760 N-INVAS EAR/PLS OXIMETRY 1: CPT

## 2024-11-11 PROCEDURE — 20000000 HC ICU ROOM

## 2024-11-11 PROCEDURE — 80162 ASSAY OF DIGOXIN TOTAL: CPT

## 2024-11-11 PROCEDURE — 27000207 HC ISOLATION

## 2024-11-11 PROCEDURE — 27100171 HC OXYGEN HIGH FLOW UP TO 24 HOURS

## 2024-11-11 PROCEDURE — 63600175 PHARM REV CODE 636 W HCPCS

## 2024-11-11 PROCEDURE — 94003 VENT MGMT INPAT SUBQ DAY: CPT

## 2024-11-11 PROCEDURE — 80053 COMPREHEN METABOLIC PANEL: CPT

## 2024-11-11 PROCEDURE — 94761 N-INVAS EAR/PLS OXIMETRY MLT: CPT

## 2024-11-11 PROCEDURE — 99233 SBSQ HOSP IP/OBS HIGH 50: CPT | Mod: ,,,

## 2024-11-11 PROCEDURE — 99233 SBSQ HOSP IP/OBS HIGH 50: CPT | Mod: ,,, | Performed by: INTERNAL MEDICINE

## 2024-11-11 PROCEDURE — 63600175 PHARM REV CODE 636 W HCPCS: Performed by: GENERAL PRACTICE

## 2024-11-11 RX ADMIN — RIVAROXABAN 20 MG: 10 TABLET, FILM COATED ORAL at 08:11

## 2024-11-11 RX ADMIN — CIPROFLOXACIN 400 MG: 2 INJECTION, SOLUTION INTRAVENOUS at 06:11

## 2024-11-11 RX ADMIN — METRONIDAZOLE 500 MG: 500 TABLET ORAL at 03:11

## 2024-11-11 RX ADMIN — SULFAMETHOXAZOLE AND TRIMETHOPRIM 1 TABLET: 800; 160 TABLET ORAL at 08:11

## 2024-11-11 RX ADMIN — METOPROLOL TARTRATE 25 MG: 25 TABLET, FILM COATED ORAL at 09:11

## 2024-11-11 RX ADMIN — METRONIDAZOLE 500 MG: 500 TABLET ORAL at 05:11

## 2024-11-11 RX ADMIN — METOPROLOL TARTRATE 25 MG: 25 TABLET, FILM COATED ORAL at 08:11

## 2024-11-11 RX ADMIN — QUETIAPINE FUMARATE 50 MG: 25 TABLET ORAL at 09:11

## 2024-11-11 RX ADMIN — CIPROFOLXACIN 750 MG: 250 TABLET ORAL at 08:11

## 2024-11-11 RX ADMIN — Medication 4 ML: at 04:11

## 2024-11-11 RX ADMIN — SULFAMETHOXAZOLE AND TRIMETHOPRIM 1 TABLET: 800; 160 TABLET ORAL at 09:11

## 2024-11-11 RX ADMIN — PANTOPRAZOLE SODIUM 40 MG: 40 INJECTION, POWDER, LYOPHILIZED, FOR SOLUTION INTRAVENOUS at 09:11

## 2024-11-11 RX ADMIN — METRONIDAZOLE 500 MG: 500 TABLET ORAL at 10:11

## 2024-11-11 RX ADMIN — Medication 4 ML: at 07:11

## 2024-11-11 RX ADMIN — TOBRAMYCIN 300 MG: 300 SOLUTION RESPIRATORY (INHALATION) at 08:11

## 2024-11-11 RX ADMIN — QUETIAPINE FUMARATE 50 MG: 25 TABLET ORAL at 08:11

## 2024-11-11 RX ADMIN — ATORVASTATIN CALCIUM 10 MG: 10 TABLET, FILM COATED ORAL at 09:11

## 2024-11-11 RX ADMIN — AMIODARONE HYDROCHLORIDE 200 MG: 200 TABLET ORAL at 09:11

## 2024-11-11 RX ADMIN — TOBRAMYCIN 300 MG: 300 SOLUTION RESPIRATORY (INHALATION) at 07:11

## 2024-11-11 NOTE — PROGRESS NOTES
Patient Name: Delio Daniel Jr.   MRN: [id number]   Admission Date: 10/20/2024   Hospital Length of Stay: 22   Attending Provider: Itz Francisco MD   Consulting Provider: Jace Haney MD    Primary Care Physician:  Jl Briones MD     Principal Problem: UTI (urinary tract infection)       Final diagnoses:  [A41.9] Sepsis  [R50.9] Fever, unspecified fever cause (Primary)  [R00.0] Tachycardia, unspecified  [N39.0] Urinary tract infection without hematuria, site unspecified      Goals of care review:    After examining the patient, I met with 5 of the patient's 9 children and a family conference to review family's understanding of the patient's current illness and discuss goals of care for the future.    Code Status:  I reviewed the risks and benefits of aggressive cardiopulmonary resuscitative measures taken in the event of a cardiopulmonary arrest event. I discussed the high risk of non-survival from such an event.  The explained that statistically, only about 1 in 15 patients survive a cardiopulmonary arrest to return home, despite aggressive CPR and life saving measures. I discussed the risk of increased morbidity and a potential decline in quality of life with survival from such an event. Finally I reviewed the risks to the patient who survives such an event, including chest wall injuries from CPR, the risk of persistent ventilator needs and the risk of anoxic brain injuries. The family currently elects a full code status.    I reviewed the fact that most patient wish to have a natural peaceful death, at home, with family and allowed to be comfortable. I explained that recurrent hospitalizations for the purpose of treating sepsis related infections, place the patient at risk of pain from IV sticks, recurrent procedures, catheterizations and wound debridements. If he were to remain a full code with a goal of aggressive treatment at this point, he will certainly die in an uncomfortable state. Family  stated that the patient has complained of pain to them. I told them that often the hospital staff can not give pain management due to the risk of hypotension or respiratory depression.  I heavily encouraged reconsidering the position of full code position to ensure that the patient has access to a natural peaceful death (which is what family states that they ultimately want for him).    Two of the family members present or in the medical field, 1 as a nurse in 1 is a nursing student.  There was some general understanding of infections and treatment with antibiotics.  I explained the nature of the patient's recurring infections withdrawal multidrug resistant and currently in 3 locations, wounds, pneumonia and recurring urinary tract infections.  We talked about the futility of continued IV antibiotics for multiple weeks with the unfortunate expectation that the patient's infections will reoccur with greater resistance.  This will result in recurring hospitalizations until end of life I explained my concern about the patient's comfort through this process, particularly with the greater focus on cure and less of a focus on quality of life and.  I reviewed the option of hospice care nursing home to allow the patient to experience greater comfort for as long as he should live.  I explained the difference between palliative care and hospice in an outpatient setting.  In his current state, the patient would most from hospice care.    Hospice review:     I reviewed the benefits of home hospice care, including aggressive symptom-based management with the intent to avoid future hospitalizations which may increase increase the risk of having a negative effect on her quality of life. I reviewed the benefit of regular visits by an assigned RN and 24/7 on call RN to address uncontrolled symptoms which may precipitate into a symptom crisis. This may prevent unwanted ER visits or hospitalizations ordinarily necessary to manage  symptoms of an advanced illness. I also reviewed benefit of regular CNA visits and the option of  and  visitations. I explained that all medications related to her diagnosis and symptom related medications would be covered by hospice, including oxygen, a hospital bed and other durable medical equipment.     His family asked if antibiotics could be continued on hospice care.  I explained that with multidrug resistant organisms, the patient is already at his limits with what can be utilized.  I explained that hospice is a business which gets paid by insurance a per char amount per patient.  If treatments provided such as very expensive antibiotic options, result in a cause greater than what the hospice receive these for this per char payment, it is not possible for the hospice to operate.  Although, there are less expensive antibiotics, they would likely be less beneficial to the patient. However, at present, the patient is not on expensive options and if possible, these may be given per PEG, I would have to review with ID before clarifying with family, then perhaps effective antibiotics can be continued in a hospice setting for the time being.    The patient's family has a fear of letting go of a treatment such as IV antibiotics discontinued would result in the patient's death.  This is a fear that they would be causing his death.  I spoke further about the futility of treatments that in the long run not be effective but also medicine his life but would not improve his quality of life.  The family seemed to understand quality of life.  I noted that many times we are not able to provide both length of life and quality of life at the same time. The family remains fearful of causing death by withholding antibiotic treatment.  I explained that withholding treatments may allow a disease to progress, but progress naturally as in a patient with cancer who can no longer receive chemotherapy.  I tried to  explain that it is not necessary to feel guilt about not providing an aggressive treatment for the purpose of lengthening life, especially if such treatment becomes futile.  I also explained that providing aggressive symptom management and comfort is doing something that is very positive and beneficial and may be considered just as important.     Again I recommended the option of hospice in the nursing home. As family remains in favor of a full code for now, it is not beneficial to continue to review the option of stopping feedings of ventilator management for the purpose of comfort care goals.  Family will need to think further on our conversation      Symptom review:    UTO due to LOC    Assessment/Plan:     Goals of care/counseling  Severe sepsis  Multidrug resistant infections including UTI pneumonia and wounds  History of CVA with left-sided weakness  Encephalopathy    Family will continue to review amongst themselves goals of care discussion and inform us of their decisions regarding code status option of focus on comfort care and hospice versus continued rest of treatment management.      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    Nodule of left lung 07/20/2022    Hypertension 10/05/2022    Hyperlipidemia LDL goal <70 10/05/2022    Hypokalemia 10/05/2022    Coronary artery disease involving native heart without angina pectoris 10/05/2022    Second degree AV block 10/05/2022    Cardiac arrest with ventricular fibrillation 10/05/2022    Cardiac pacemaker in situ 10/05/2022    History of deep vein thrombosis (DVT) of lower extremity 08/08/2023    Current use of long term anticoagulation 08/08/2023    Bilateral lower extremity edema 08/16/2023    Benign prostatic hyperplasia 12/11/2023    Seizure 01/17/2024    Nausea and vomiting 08/10/2024    Esophagitis 08/10/2024    Pneumonia due to infectious organism 08/23/2024     Resolved Ambulatory Problems      Diagnosis Date Noted    Hypertensive urgency 08/09/2023    Atrial fibrillation with rapid ventricular response 08/26/2023    Myocardial infarction 12/11/2023    Stroke 12/19/2023    Acute hypoxemic respiratory failure 01/12/2024    Coffee ground emesis 01/17/2024    Aspiration of gastric contents 07/19/2024    Severe sepsis 07/19/2024    Acute cystitis without hematuria 07/21/2024     Past Medical History:   Diagnosis Date    Arthritis     Atrial fibrillation     BPH (benign prostatic hyperplasia)     Cardiac arrest     Coronary artery disease     Cyst, kidney, acquired     Diverticulosis     Hyperlipidemia     MI (myocardial infarction)     Obesity     Steatosis of liver         Past Surgical History:   Procedure Laterality Date    A-V CARDIAC PACEMAKER INSERTION Right     CARDIAC CATHETERIZATION      COLONOSCOPY W/ BIOPSIES      CRANIECTOMY Right 12/20/2023    Procedure: CRANIECTOMY;  Surgeon: Artem Can MD;  Location: [facility];  Service: Neurosurgery;  Laterality: Right;    ESOPHAGOGASTRODUODENOSCOPY W/ PEG N/A 1/2/2024    Procedure: PEG;  Surgeon: Tani Day MD;  Location: [facility] ENDOSCOPY;  Service: Gastroenterology;  Laterality: N/A;    ESOPHAGOGASTRODUODENOSCOPY W/ PEG N/A 10/30/2024    Procedure: EGD w/ Jtube placement;  Surgeon: Gabriele Salcido MD;  Location: [facility] ENDOSCOPY;  Service: Endoscopy;  Laterality: N/A;  with J tube extension    excision of colon      TRACHEOSTOMY N/A 12/29/2023    Procedure: CREATION, TRACHEOSTOMY;  Surgeon: Patricia Winslow MD;  Location: [facility];  Service: ENT;  Laterality: N/A;  REQ 1130 //  NEEDS 2 SCRUBS        Review of patient's allergies indicates:  No Known Allergies       Current Facility-Administered Medications:     0.9%  NaCl infusion (for blood administration), , Intravenous, Q24H PRN, Mark Mckee MD    acetaminophen tablet 650 mg, 650 mg, Per G Tube, Q6H PRN, Kadie Rojas DO, 650 mg at 11/09/24 2136    amiodarone tablet 200  mg, 200 mg, Per G Tube, Daily, Leopoldo Mullins, FNP, 200 mg at 11/11/24 0945    atorvastatin tablet 10 mg, 10 mg, Per G Tube, Daily, Kadie Rojas DO, 10 mg at 11/11/24 0945    ciprofloxacin HCl tablet 750 mg, 750 mg, Oral, Q12H, Raina Brennan MD    dextromethorphan-guaiFENesin  mg/5 ml liquid 10 mL, 10 mL, Per G Tube, Q4H PRN, Eugene Zimmerman MD, 10 mL at 11/03/24 0430    dextrose 10% bolus 125 mL 125 mL, 12.5 g, Intravenous, PRN, Kadie Rojas DO, Stopped at 10/30/24 0951    dextrose 10% bolus 250 mL 250 mL, 25 g, Intravenous, PRN, Kadie Rojas DO, Stopped at 10/31/24 0721    glucagon (human recombinant) injection 1 mg, 1 mg, Intramuscular, PRN, Kadie Rojas DO    hydrALAZINE injection 10 mg, 10 mg, Intravenous, Q4H PRN, Kadie Rojas DO    metoclopramide HCl 5 mg/5 mL solution 10 mg, 10 mg, Oral, Q8H PRN, Gasper Ceballos DO    metoprolol tartrate (LOPRESSOR) tablet 25 mg, 25 mg, Per G Tube, BID, Leopoldo Mullins, FNP, 25 mg at 11/11/24 0945    metroNIDAZOLE tablet 500 mg, 500 mg, Oral, Q8H, Kenneth Bhardwaj MD, 500 mg at 11/11/24 1533    naloxone 0.4 mg/mL injection 0.02 mg, 0.02 mg, Intravenous, PRN, Kadie Rojas DO    pantoprazole injection 40 mg, 40 mg, Intravenous, Daily, Kadie Rojas DO, 40 mg at 11/11/24 0942    QUEtiapine tablet 50 mg, 50 mg, Per G Tube, BID, Eugene Zimmerman MD, 50 mg at 11/11/24 0945    rivaroxaban tablet 20 mg, 20 mg, Per G Tube, Nightly, Kadie Rojas DO, 20 mg at 11/10/24 2010    sodium chloride 0.9% flush 10 mL, 10 mL, Intravenous, Q12H PRN, Kadie Rojas DO    sodium chloride 3% nebulizer solution 4 mL, 4 mL, Nebulization, Q8H, Itz Francisco MD, 4 mL at 11/11/24 1621    sulfamethoxazole-trimethoprim 800-160mg per tablet 1 tablet, 1 tablet, Oral, BID, Kenneth Bhardwaj MD, 1 tablet at 11/11/24 0945    tobramycin (PF) 300 mg/5 mL nebulizer solution 300 mg, 300 mg, Nebulization, Q12H, Maryam,  "Itz CHUNG MD, 300 mg at 11/11/24 0736       Current Facility-Administered Medications:     0.9%  NaCl infusion (for blood administration), , Intravenous, Q24H PRN    acetaminophen, 650 mg, Per G Tube, Q6H PRN    dextromethorphan-guaiFENesin  mg/5 ml, 10 mL, Per G Tube, Q4H PRN    dextrose 10%, 12.5 g, Intravenous, PRN    dextrose 10%, 25 g, Intravenous, PRN    glucagon (human recombinant), 1 mg, Intramuscular, PRN    hydrALAZINE, 10 mg, Intravenous, Q4H PRN    metoclopramide HCl, 10 mg, Oral, Q8H PRN    naloxone, 0.02 mg, Intravenous, PRN    sodium chloride 0.9%, 10 mL, Intravenous, Q12H PRN     Family History   Problem Relation Name Age of Onset    Hypertension Mother      Hypertension Father      Hypertension Sister            Review of Systems   Unable to perform ROS: Mental status change            Objective:   /83   Pulse 87   Temp 98.9 °F (37.2 °C) (Oral)   Resp (!) 28   Ht 5' 9.02" (1.753 m)   Wt 69.1 kg (152 lb 5.4 oz)   SpO2 (!) 94%   BMI 22.49 kg/m²      Physical Exam   Constitutional: He appears ill.   HENT:   Right Ear: External ear normal.   Left Ear: External ear normal.   Nose: Nose normal.   Mouth/Throat: Mucous membranes are moist. Oropharynx is clear.   Eyes: Pupils are equal, round, and reactive to light. Conjunctivae are normal.   Cardiovascular: Normal rate, regular rhythm, normal heart sounds and normal pulses. Pulmonary:      Effort: Pulmonary effort is normal.     Abdominal: Soft. Bowel sounds are normal.   Musculoskeletal:      Right lower leg: No edema.      Left lower leg: No edema.   Neurological: He is disoriented.   Skin: Lesion noted.   Vitals reviewed.         Review of Symptoms  Review of Symptoms      Symptom Assessment (ESAS 0-10 Scale)  Pain:  0  Dyspnea:  0  Anxiety:  0  Nausea:  0  Depression:  0  Anorexia:  0  Fatigue:  0  Insomnia:  0  Restlessness:  0  Agitation:  0     CAM / Delirium:  Positive      Performance Status:  20    Living Arrangements:  Lives " in nursing home    Psychosocial/Cultural:   See Palliative Psychosocial Note: Yes  9 children, Spoke to martine Camposr. No living spouse.No POA  **Primary  to Follow**  Palliative Care  Consult: No    Spiritual:  F - Ayanna and Belief:  Nondenominational  A - Address in Care:  [place] jenna [place], dtr. Is a       Advance Care Planning   Advance Directives:   [place]: Yes    Do Not Resuscitate Status: No    Medical Power of : No      Decision Making:  Family answered questions and Patient unable to communicate due to disease severity/cognitive impairment  Goals of Care: What is most important right now is to focus on to be determined. Accordingly, we have decided that the best plan to meet the patient's goals includes to be determined based on future family discussion..                > 50% of 35 min of encounter was spent in chart review, face to face discussion of goals of care, symptom assessment, coordination of care and emotional support.     An additional 35 min of time was spent in Advanced Care Planning discussion    Jace Haney MD  Palliative Medicine  [facility] - Observation [facility]

## 2024-11-11 NOTE — SUBJECTIVE & OBJECTIVE
Subjective:     HPI:  Wound medicine re-check    The patient is a 69 year old male who presented to [facility] ED on 10/20/24 from [facility] with decreased responsiveness, sepsis, and recurrent UTI. Noted to be hypotensive with Afib and RVR, also requiring mechanical ventilation and admitted to the ICU.   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 Patient is non-verbal at baseline, contracted upper and lower extremities on left side. 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   Blood cultures on admission + Enterococcus faecium - VRE per BCID. Urine culture with Klebsiella and Proteus. ID guiding antibiotic stewardship.     Patient admitted with unstageable pressure ulcer of sacrum; seen by inpatient wound nurse and treatment was iniatated, wound medicine consulted for evaluation and possible debridement.   No family present at time of assessment, all information gained through chart review. In June 2024 appears that patient was seen by wound  for sacral pressure ulcer, no visit notes or images availabe from this time frame. First image of sacral region in May 2024 with wound of sacrum/coccyx. In July 2024 images appears that wound had healed with remaining dark discoloration of sacrum/buttocks and then again noted with wound in images of late August 2024; appears to have evolved and worsened up to this point.  Initial evaluation on 10/22/24 - several pressure ulcers with most concerning is the sacral/coccyx unstageable ulcer. Also with fecal incontinence which is contaminating the wound as it is veryc close to the anus. He is contracted and we were unable to get full visualization and positioning for bedside debridement. Recommendations made for palliative care consultation  to discuss  overall goals of care.   Palliative care consulted and discussion held with family in which they have decided to continue with treatment.   Bedside debridement of sacral ulcer with Surgery on 11/2/24.   Wound Vac applied on 11/4/24  ID continues to follow, repeat Bcx in process from 11/8; resp cx from 11/1 with [place] and Pseudomonas - continues guiding antibiotic stewardship   11/11/24 - wound re-check today. Met patient in room IN02, he is resting with eyes closed, non-verbal with trach on mechanical ventilation. He is on ICU low air loss bed with bilateral heel boots on, wedge under his left side. Accompanied by ICU nurse as well as inpatient wound nurse. Difficult to reposition because of contractures.  No acute distress. Plans for family meeting this afternoon concerning poor overall outlook and consideration of palliative care.                Hospital Course:   No notes on file      Follow-up For: Procedure(s) (LRB):  EGD w/ Jtube placement (N/A)    Post-Operative Day: 12 Days Post-Op    Scheduled Meds:   amiodarone  200 mg Per G Tube Daily    atorvastatin  10 mg Per G Tube Daily    ciprofloxacin HCl  750 mg Oral Q12H    metoprolol tartrate  25 mg Per G Tube BID    metroNIDAZOLE  500 mg Oral Q8H    pantoprazole  40 mg Intravenous Daily    QUEtiapine  50 mg Per G Tube BID    rivaroxaban  20 mg Per G Tube Nightly    sodium chloride 3%  4 mL Nebulization Q8H    sulfamethoxazole-trimethoprim 800-160mg  1 tablet Oral BID    tobramycin (PF)  300 mg Nebulization Q12H     Continuous Infusions:  PRN Meds:  Current Facility-Administered Medications:     0.9%  NaCl infusion (for blood administration), , Intravenous, Q24H PRN    acetaminophen, 650 mg, Per G Tube, Q6H PRN    dextromethorphan-guaiFENesin  mg/5 ml, 10 mL, Per G Tube, Q4H PRN    dextrose 10%, 12.5 g, Intravenous, PRN    dextrose 10%, 25 g, Intravenous, PRN    glucagon (human recombinant), 1 mg, Intramuscular, PRN    hydrALAZINE, 10 mg, Intravenous, Q4H  PRN    metoclopramide HCl, 10 mg, Oral, Q8H PRN    naloxone, 0.02 mg, Intravenous, PRN    sodium chloride 0.9%, 10 mL, Intravenous, Q12H PRN    Review of Systems   Unable to perform ROS: Patient nonverbal     Objective:     Vital Signs (Most Recent):  Temp: 98.9 °F (37.2 °C) (11/11/24 1200)  Pulse: 79 (11/11/24 1411)  Resp: (!) 24 (11/11/24 1411)  BP: 136/83 (11/11/24 1300)  SpO2: 96 % (11/11/24 1411) Vital Signs (24h Range):  Temp:  [98.7 °F (37.1 °C)-100 °F (37.8 °C)] 98.9 °F (37.2 °C)  Pulse:  [77-94] 79  Resp:  [23-34] 24  SpO2:  [90 %-100 %] 96 %  BP: (110-141)/() 136/83     Weight: 69.1 kg (152 lb 5.4 oz)  Body mass index is 22.49 kg/m².     Physical Exam  Vitals reviewed.   Constitutional:       Comments: Eyes closed, appears to be resting comfortably        HENT:      Head:      Comments: Right bone flap      Neck:      Comments: Tracheostomy   Pulmonary:      Comments: Mechanical ventilation    Abdominal:      Comments: PEG/J-Tube   Skin:     General: Skin is warm and dry.      Capillary Refill: Capillary refill takes less than 2 seconds.      Findings: Wound present.             Comments:     Left 1st and 2nd toe abrasions - JAYME   Neurological:      Comments: Left upper and lower extremity contracted  Moves RUE       Sacrum: 8.2 x 10 x 2.6 cm     with undermining from 1-5 o'clock and from 7-9 o'clock, 1 cm. Tunneling at 3 o'clock 2.6 cm      Left medial knee 0.2 x 0.1 cm - Lateral knee: 0.8 x 1 x 0.1 cm       Left lateral ankle: 0.8 x 0.8 x 0.1 cm              Laboratory:  A1C:   Recent Labs   Lab 08/26/24  1221   HGBA1C 5.3       BMP:   Recent Labs   Lab 11/11/24  0352      K 4.1      CO2 32*   BUN 19.8   CREATININE 0.51*   CALCIUM 8.4*       CBC:   Recent Labs   Lab 11/11/24 0352   WBC 8.81   RBC 3.01*   HGB 8.4*   HCT 26.8*      MCV 89.0   MCH 27.9   MCHC 31.3*     CMP:   Recent Labs   Lab 11/11/24 0352   CALCIUM 8.4*   ALBUMIN 1.7*      K 4.1   CO2 32*      BUN  19.8   CREATININE 0.51*   ALKPHOS 73   ALT 15   AST 18   BILITOT 0.5       LFTs:   Recent Labs   Lab 11/11/24  0352   ALT 15   AST 18   ALKPHOS 73   BILITOT 0.5   ALBUMIN 1.7*       Microbiology Results (last 7 days)       Procedure Component Value Units Date/Time    Blood Culture [[id number]]  (Normal) Collected: 11/08/24 0839    Order Status: Completed Specimen: Blood Updated: 11/11/24 1102     Blood Culture No Growth At 72 Hours    Blood Culture [[id number]]  (Normal) Collected: 11/08/24 0929    Order Status: Completed Specimen: Blood Updated: 11/11/24 1102     Blood Culture No Growth At 72 Hours    Respiratory Culture [[id number]]  (Abnormal)  (Susceptibility) Collected: 11/01/24 1619    Order Status: Completed Specimen: Respiratory from Sputum, Expectorated Updated: 11/11/24 0618     Respiratory Culture Many ACINETOBACTER BAUMANNII      Many Pseudomonas aeruginosa     GRAM STAIN Quality 2+      Many Gram Negative Rods      Few Yeast              Diagnostic Results:  I have reviewed all pertinent imaging results/findings within the past 24 hours.

## 2024-11-11 NOTE — PROGRESS NOTES
Pulmonary & Critical Care Medicine   Progress Note      Presenting History/HPI:    Patient is a 70 y/o male with extensive PMH who presented from NH on 10/20/24 with decreased responsiveness, severe sepsis, and recurrent UTI. 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     Patient transferred to ED from [facility] with lethargy and tachycardia. Family at bedside reports patient is nonverbal at baseline but typically more alert. In the ED, patient is febrile, tachycardic with -190s, tachypneic, /60. EKG shows Afib with RVR. Diltiazem drip started in ED. Labs are significant for WBC of 16.5, lactic acid of 3.3, Cr 1.48, BUN 45.3, Na 155, K+ 5.1, troponin elevated at 0.118. UA with evidence of UTI. Of note, pt was being treated outpatient for a UTI, currently on day 4 of 7 day Rocephin course. Urine culture 10/16/24 with multidrug resistant Klebsiella pneumonia and Proteus mirabilis with susceptibility to Cefepime. Patient received 3L of NS and initiated on Vanc and Cefepime in ED. Admitted to the ICU on mechanical ventilation via trach.    Admitted to the ICU on 10/20   Peg tube extension to J-tube on 10/30    Interval History:  NAEON. Vitals shows patient afebrile over past 24 hours with stable saturation on 35% mechanical ventilation via trach. Documented urine output 750 cc over past 24 hours. Suspect higher. Telemetry continues to show rate controlled atrial fibrillation. Original sputum culture positive for Acinetobacter baumannii  sensitive only to gentamicin and tobramycin and Pseudomonas aeruginosa only sensitive to ciprofloxacin gentamicin and tobramycin. Original blood cultures negative.  Completed treatment with IV linezolid and meropenem (11/05/2024). At this point in time patient is likely colonized with acinetobacter baumannii and Pseudomonas aeruginosa. Patient on HOD 19 developed repeat episode of fever and uptrending leukocytosis. CXR obtained due to concern for repeat development of pneumonia given lab work and prior episodes of fever. CXR on personal read showed new left sided infiltrate. Given above, ID re-consulted. Initiated IV cipro 500 mg bid, ds bactrim bid, metronidazole 500 mg tid, and inhaled tobramycin for repeat pneumonia which he continues on at this time. Repeat blood cultures NTD. Consulted palliative care for assistance with goals of care discussions as patient long term prognosis is poor even with treatment. Family with plans to discuss care plan today.    Scheduled Medications:    amiodarone  200 mg Per G Tube Daily    atorvastatin  10 mg Per G Tube Daily    ciprofloxacin  400 mg Intravenous Q12H    metoprolol tartrate  25 mg Per G Tube BID    metroNIDAZOLE  500 mg Oral Q8H    pantoprazole  40 mg Intravenous Daily    QUEtiapine  50 mg Per G Tube BID    rivaroxaban  20 mg Per G Tube Nightly    sodium chloride 3%  4 mL Nebulization Q8H    sulfamethoxazole-trimethoprim 800-160mg  1 tablet Oral BID    tobramycin (PF)  300 mg Nebulization Q12H       PRN Medications:     Current Facility-Administered Medications:     0.9%  NaCl infusion (for blood administration), , Intravenous, Q24H PRN    acetaminophen, 650 mg, Per G Tube, Q6H PRN    dextromethorphan-guaiFENesin  mg/5 ml, 10 mL, Per G Tube, Q4H PRN    dextrose 10%, 12.5 g, Intravenous, PRN    dextrose 10%, 25 g, Intravenous, PRN    glucagon (human recombinant), 1 mg, Intramuscular, PRN    hydrALAZINE, 10 mg, Intravenous, Q4H PRN    metoclopramide HCl, 10 mg, Oral, Q8H PRN    naloxone, 0.02 mg, Intravenous, PRN    sodium chloride 0.9%, 10 mL, Intravenous, Q12H PRN      Infusions:          Fluid Balance:     Intake/Output  Summary (Last 24 hours) at 11/11/2024 0555  Last data filed at 11/11/2024 0526  Gross per 24 hour   Intake 435 ml   Output 1850 ml   Net -1415 ml         Vital Signs:   Vitals:    11/11/24 0500   BP: 125/83   Pulse: 92   Resp: (!) 27   Temp:          Physical Exam  Vitals and nursing note reviewed.   Constitutional:       Appearance: He is ill-appearing.   HENT:      Head: Normocephalic.      Right Ear: External ear normal.      Left Ear: External ear normal.      Nose: Nose normal.      Mouth/Throat:      Mouth: Mucous membranes are moist.      Pharynx: Oropharynx is clear. No oropharyngeal exudate or posterior oropharyngeal erythema.   Eyes:      General: No scleral icterus.     Extraocular Movements: Extraocular movements intact.      Conjunctiva/sclera: Conjunctivae normal.      Pupils: Pupils are equal, round, and reactive to light.   Neck:      Comments: Tracheostomy in place, site clean and dry  Cardiovascular:      Rate and Rhythm: Normal rate and regular rhythm.      Pulses: Normal pulses.      Heart sounds: Normal heart sounds. No murmur heard.     No friction rub. No gallop.   Pulmonary:      Effort: Pulmonary effort is normal. No respiratory distress.      Breath sounds: Normal breath sounds. No stridor. No wheezing, rhonchi or rales.      Comments: On mechanical ventilation via tracheostomy tube, no dyssynchrony seen on mechanical ventilation, no accessory muscle use   Chest:      Chest wall: No tenderness.   Abdominal:      General: Abdomen is flat. Bowel sounds are normal. There is no distension.      Palpations: Abdomen is soft.      Tenderness: There is no abdominal tenderness. There is no rebound.      Comments: J-tube in place site clean and dry   Musculoskeletal:      Cervical back: Normal range of motion. No rigidity or tenderness.   Skin:     General: Skin is warm and dry.      Capillary Refill: Capillary refill takes less than 2 seconds.      Coloration: Skin is not jaundiced.      Findings:  "No bruising, erythema or rash.   Neurological:      Mental Status: Mental status is at baseline.      Comments: Nonverbal, opens eyes to loud name call, not moving any extremities randomly or on command   Psychiatric:      Comments: Unable to fully assess       Ventilator Settings  Vent Mode: A/C (11/11/24 0333)  Ventilator Initiated: Yes (10/20/24 1546)  Set Rate: 20 BPM (11/11/24 0333)  Vt Set: 500 mL (11/11/24 0333)  PEEP/CPAP: 8 cmH20 (11/11/24 0333)  Oxygen Concentration (%): 35 (11/11/24 0333)  Peak Airway Pressure: 16 cmH20 (11/11/24 0333)  Plateau Pressure: 3 cmH20 (11/01/24 0338)  Total Ve: 13.2 L/m (11/11/24 0333)  F/VT Ratio<105 (RSBI): (!) 51.28 (11/11/24 0333)      Laboratory Studies:   No results for input(s): "PH", "PCO2", "PO2", "HCO3", "POCSATURATED", "BE" in the last 24 hours.  Recent Labs   Lab 11/11/24 0352   WBC 8.81   RBC 3.01*   HGB 8.4*   HCT 26.8*      MCV 89.0   MCH 27.9   MCHC 31.3*       Recent Labs   Lab 11/11/24 0352   GLUCOSE 104      K 4.1      CO2 32*   BUN 19.8   CREATININE 0.51*   CALCIUM 8.4*           Microbiology Data:   Microbiology Results (last 7 days)       Procedure Component Value Units Date/Time    Blood Culture [[id number]]  (Normal) Collected: 11/08/24 0839    Order Status: Completed Specimen: Blood Updated: 11/10/24 1102     Blood Culture No Growth At 48 Hours    Blood Culture [[id number]]  (Normal) Collected: 11/08/24 0929    Order Status: Completed Specimen: Blood Updated: 11/10/24 1102     Blood Culture No Growth At 48 Hours    Respiratory Culture [[id number]]  (Abnormal)  (Susceptibility) Collected: 11/01/24 1619    Order Status: Completed Specimen: Respiratory Updated: 11/10/24 1032     Respiratory Culture Many ACINETOBACTER BAUMANNII      Many Pseudomonas aeruginosa     GRAM STAIN Quality 2+      Many Gram Negative Rods      Few Yeast    Clostridium Diff Toxin, A & B, EIA [[id number]]  (Normal) Collected: 11/02/24 1730    Order Status: " Completed Specimen: Stool Updated: 11/04/24 0652     Clostridium Difficile GDH Antigen Negative     Clostridium Difficile Toxin A/B Negative              Imaging:   X-Ray Chest 1 View  Narrative: EXAMINATION:  XR CHEST 1 VIEW    CLINICAL HISTORY:  Pneumonia;    TECHNIQUE:  Single view of the chest    COMPARISON:  10/25/2024    FINDINGS:  The cardiomediastinal silhouette remains prominent.  Interval development of right upper lobe and left hilar opacifications.  Recommend continued follow-up.  Impression: Findings concerning for development of infectious process.  Recommend continued follow-up.    Electronically signed by: Jg Plunkett  Date:    11/08/2024  Time:    09:41          Assessment and Plan    Assessment:  Sepsis without shock with underlying VRE Enterococcus and ESBL Klebsiella bacteremia with Klebsiella and Proteus pneumonia on 10/20/10/24 (resolved)  -Acinetobacter and Pseudomonas positive sputum culture on 11/01/2024 with Acinetobacter sensitive to gentamicin and tobramycin and Pseudomonas sensitive to ciprofloxacin gentamicin and tobramycin, suspect colonization without leukocytosis or increased sputum production or worsening hypoxia  Repeat pneumonia without sepsis and/or septic shock (11/08/2024)  -CXR on personal read showed new left sided infiltrate.   -blood cultures NTD  Chronic hypoxemic respiratory failure with tracheostomy on permanent mechanical ventilatory support secondary to prior cerebrovascular accident with subsequent hemorrhagic stroke status post craniectomy   AFib with RVR (rate controlled)  Chronic sacral decubitus ulcer present on admission  -PEG tube feed intolerance status post J-tube extension with tolerance of tube feeds  Altered mental status due to the above intracranial process    Plan:  -titrate mechanical ventilation for ARDS net protocol   -supplement oxygen to maintain saturation 94-96%   -routine tracheostomy care   -tube feeds to goal with free water flushes as  appropriate   -continues on digoxin, Lopressor, and amiodarone; remains in afib, nothing further to do for Afib  -continue IV cipro 500 mg bid, ds bactrim bid, metronidazole 500 mg tid, and inhaled tobramycin per ID recs  -wound care for sacral decubitus ulcer debrided at bedside on 11/02 with wound VAC placement on 11/04, appreciate assistance from Wound Care and from General surgery  -palliative care consulted for goals of care discussion with family as overall prognosis remains poor; family meeting planned for Monday    DVT ppx/tx with Xarelto  GI ppx with protonix  Keep HOB elevated > 30*    32 minutes of critical care was time spent personally by me on the following activities: development of treatment plan with patient or surrogate and bedside caregivers, discussions with consultants, evaluation of patient's response to treatment, examination of patient, ordering and performing treatments and interventions, ordering and review of laboratory studies, ordering and review of radiographic studies, pulse oximetry, re-evaluation of patient's condition.  This critical care time did not overlap with that of any other provider or involve time for any procedures.     Humberto Quintana MD  11/11/2024  Pulmonology/Critical Care

## 2024-11-11 NOTE — PROGRESS NOTES
Inpatient Nutrition Assessment    Admit Date: 10/20/2024   Total duration of encounter: 22 days   Patient Age: 69 y.o.    Nutrition Recommendation/Prescription     Tube feeding recommendation:     Impact Peptide 1.5 goal rate 70 ml/hr to provide  2100 kcal/d  (111% est needs)  131 g protein/d (100% est needs)  1078 ml free water/d (52% est needs)  (calculations based on estimated 20 hr/d run time)     If no IV fluids running, can give 100ml q 2hr water flushes. Total water provided: 2078ml (100% est needs.)     Patel (provides 90 kcal, 2.5 g protein per serving) BID.    Consider per MD:  MVI +Fe 1 po daily  Vit C 500mg BID  Vit A 10,000 IU daily  Zinc sulfate 220mg Daily     Communication of Recommendations: reviewed with nurse    Nutrition Assessment     Malnutrition Assessment/Nutrition-Focused Physical Exam    Malnutrition Context: chronic illness (10/21/24 1028)  Malnutrition Level: severe (10/21/24 1028)  Energy Intake (Malnutrition):  (unable to eval) (10/21/24 1028)  Weight Loss (Malnutrition): greater than 10% in 6 months (10/21/24 1028)  Subcutaneous Fat (Malnutrition): severe depletion (10/21/24 1028)  Orbital Region (Subcutaneous Fat Loss): severe depletion  Upper Arm Region (Subcutaneous Fat Loss): severe depletion     Muscle Mass (Malnutrition): severe depletion (10/21/24 1028)     Clavicle Bone Region (Muscle Loss): severe depletion  Clavicle and Acromion Bone Region (Muscle Loss): severe depletion  Scapular Bone Region (Muscle Loss): severe depletion              Fluid Accumulation (Malnutrition):  (does not meet criteria) (10/21/24 1028)        A minimum of two characteristics is recommended for diagnosis of either severe or non-severe malnutrition.    Chart Review    Reason Seen: continuous nutrition monitoring and physician consult for TF    Malnutrition Screening Tool Results   Have you recently lost weight without trying?: Unsure  Have you been eating poorly because of a decreased appetite?: No    MST Score: 2   Diagnosis:  Severe sepsis   UTI  Afib with RVR  Hypernatremia   Sacral wound    Relevant Medical History:    Arthritis      Atrial fibrillation      BPH (benign prostatic hyperplasia)      Cardiac arrest      Coronary artery disease      Cyst, kidney, acquired      Diverticulosis      Hyperlipidemia      Hypertension      MI (myocardial infarction)      Obesity      Steatosis of liver      Stroke      Scheduled Medications:  amiodarone, 200 mg, Daily  atorvastatin, 10 mg, Daily  ciprofloxacin, 400 mg, Q12H  metoprolol tartrate, 25 mg, BID  metroNIDAZOLE, 500 mg, Q8H  pantoprazole, 40 mg, Daily  QUEtiapine, 50 mg, BID  rivaroxaban, 20 mg, Nightly  sodium chloride 3%, 4 mL, Q8H  sulfamethoxazole-trimethoprim 800-160mg, 1 tablet, BID  tobramycin (PF), 300 mg, Q12H    Continuous Infusions:       PRN Medications:  0.9%  NaCl infusion (for blood administration), , Q24H PRN  acetaminophen, 650 mg, Q6H PRN  dextromethorphan-guaiFENesin  mg/5 ml, 10 mL, Q4H PRN  dextrose 10%, 12.5 g, PRN  dextrose 10%, 25 g, PRN  glucagon (human recombinant), 1 mg, PRN  hydrALAZINE, 10 mg, Q4H PRN  metoclopramide HCl, 10 mg, Q8H PRN  naloxone, 0.02 mg, PRN  sodium chloride 0.9%, 10 mL, Q12H PRN    Calorie Containing IV Medications: no significant kcals from medications at this time    Recent Labs   Lab 11/05/24  0451 11/06/24  0239 11/07/24  0252 11/08/24  0256 11/09/24  0457 11/09/24  0842 11/10/24  0251 11/11/24  0352    142 142 146*  --  145 144 143   K 3.1* 3.5 3.8 4.0  --  3.9 3.8 4.1   CALCIUM 7.1* 7.9* 8.0* 8.2*  --  7.8* 8.2* 8.4*    108* 108* 109*  --  107 106 107   CO2 26 26 26 26  --  30 30 32*   BUN 18.3 18.2 18.5 19.8  --  22.0 21.9 19.8   CREATININE 0.56* 0.54* 0.52* 0.52*  --  0.56* 0.55* 0.51*   EGFRNORACEVR >60 >60 >60 >60  --  >60 >60 >60   GLUCOSE 102 93 105 96  --  116* 100 104   BILITOT 0.8 1.5 1.1 1.3  --  0.8 0.6 0.5   ALKPHOS 84 91 84 82  --  82 78 73   ALT 8 11 12 12  --  17 18 15    AST 12 15 19 21  --  29 25 18   ALBUMIN 1.8* 1.8* 1.8* 1.8*  --  1.7* 1.7* 1.7*   WBC 8.67 12.84* 14.95* 16.48* 12.22*  --  11.08 8.81   HGB 6.1* 10.1* 9.8* 10.2* 9.0*  --  9.5* 8.4*   HCT 18.8* 29.9* 29.4* 30.8* 28.2*  --  31.7* 26.8*     Nutrition Orders:  Diet NPO  Tube Feedings/Formulas 70; 1,400; Impact Peptide 1.5; Peg; 100; Every 2 hours,Tube Feedings/Formulas Other (see comments); Peg; Patel - Orange    Appetite/Oral Intake: not applicable/not applicable  Factors Affecting Nutritional Intake: on mechanical ventilation and tracheostomy  Social Needs Impacting Access to Food: none identified (from NH)  Food/Jain/Cultural Preferences: unable to obtain  Food Allergies: no known food allergies  Last Bowel Movement: 11/10/24  Wound(s):[REMOVED]      Altered Skin Integrity 02/26/24 1100 lower Buttocks Moisture associated dermatitis-Tissue loss description: Full thickness       Wound 08/19/24 1500 Pressure Injury Left anterior Leg-Tissue loss description: Partial thickness       Wound 08/05/24 1420 Pressure Injury Left Knee-Tissue loss description: Partial thickness       Wound 10/20/24 2100 Pressure Injury Sacral spine-Tissue loss description: Full thickness       Wound 08/19/24 1500 Pressure Injury Left lateral Ankle-Tissue loss description: Partial thickness     Comments    10/21/24: Pt with previous EMR wts indicating wt loss. On NH TF. Also with increased protein needs due to wounds.     10/25/24: TF on hold. Pt continues with vomiting. On Reglan.     10/29/24: TF still off. Plans for gtube extension into jejunum.     11/1/24: TF @ 55ml/hr, plans to advance per RN. Pt now with G-J tube placement. Tolerated so far per RN.     11/5/24: TF continues. Tolerated per RN. Noted recent labs, may need to consider adding additional vitamin and minerals.     11/7/24: TF continues, tolerated @ goal rate per RN.     11/11/24;TF continues, tolerated @ goal rate per RN. Noted plans for family meeting today.  "    Anthropometrics    Height: 5' 9.02" (175.3 cm), Height Method: Estimated  Last Weight: 69.1 kg (152 lb 5.4 oz) (10/21/24 1026), Weight Method: Bed Scale  BMI (Calculated): 22.5  BMI Classification: normal (BMI 18.5-24.9)        Ideal Body Weight (IBW), Male: 160.12 lb     % Ideal Body Weight, Male (lb): 95.21 %                 Usual Body Weight (UBW), k kg (per EMR from 24)  % Usual Body Weight: 76.94  % Weight Change From Usual Weight: -23.22 %  Usual Weight Provided By: EMR weight history    Wt Readings from Last 5 Encounters:   10/21/24 69.1 kg (152 lb 5.4 oz)   24 90.7 kg (199 lb 15.3 oz)   24 117.9 kg (260 lb)   24 117.9 kg (260 lb)   24 90.7 kg (200 lb)     Weight Change(s) Since Admission:   Wt Readings from Last 1 Encounters:   10/21/24 1026 69.1 kg (152 lb 5.4 oz)   10/21/24 0634 69.1 kg (152 lb 5.4 oz)   10/20/24 1535 63.5 kg (140 lb)   Admit Weight: 63.5 kg (140 lb) (10/20/24 1535), Weight Method: Estimated    Estimated Needs    Weight Used For Calorie Calculations: 69.1 kg (152 lb 5.4 oz)  Energy Calorie Requirements (kcal): 1886kcal (1.3 stress factor)  Energy Need Method: [facility]  Weight Used For Protein Calculations: 69.1 kg (152 lb 5.4 oz)  Protein Requirements: 124-138gm (1.8-2g/kg)  Fluid Requirements (mL): 2073ml (30ml/kg)  CHO Requirement: 210gm (45% est kcal needs)     Enteral Nutrition     Formula: Impact Peptide 1.5 [place]  Rate/Volume: 70ml/hr  Water Flushes: 100ml q2hr  Additives/Modulars: Patel  Route: PEG/J  Method: continuous  Total Nutrition Provided by Tube Feeding, Additives, and Flushes:  Calories Provided  2100 kcal/d, 111% needs   Protein Provided  131 g/d, 100% needs   Fluid Provided  2078 ml/d, 100% needs   Continuous feeding calculations based on estimated 20 hr/d run time unless otherwise stated.    Parenteral Nutrition     Patient not receiving parenteral nutrition support at this time.    Evaluation of Received Nutrient " Intake    Calories: meeting estimated needs  Protein: meeting estimated needs    Patient Education     Not applicable.    Nutrition Diagnosis     PES: Inadequate oral intake related to chronic illness as evidenced by trach/G-J tube. (active)     PES: Severe chronic disease or condition related malnutrition related to chronic illness as evidenced by severe fat depletion, severe muscle depletion, and greater than 10% weight loss in 6 months. (active)    Nutrition Interventions     Intervention(s): collaboration with other providers    Goal: Meet greater than 80% of nutritional needs by follow-up. (goal progressing)  Goal: Tolerate enteral feeding at goal rate by follow-up. (goal progressing)    Nutrition Goals & Monitoring     Dietitian will monitor: energy intake and enteral nutrition intake  Discharge planning: too early to determine; pending clinical course  Nutrition Risk/Follow-Up: high (follow-up in 1-4 days)   Please consult if re-assessment needed sooner.

## 2024-11-11 NOTE — PROGRESS NOTES
[facility] - [facility] ICU  Wound Care  Progress Note    Patient Name: Delio Daniel Jr.  MRN: [id number]  Admission Date: 10/20/2024  Hospital Length of Stay: 22 days  Attending Physician: Itz Francisco MD  Primary Care Provider: Jl Briones MD     Subjective:     HPI:  Wound medicine re-check    The patient is a 69 year old male who presented to [facility] ED on 10/20/24 from [facility] with decreased responsiveness, sepsis, and recurrent UTI. Noted to be hypotensive with Afib and RVR, also requiring mechanical ventilation and admitted to the ICU.   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 Patient is non-verbal at baseline, contracted upper and lower extremities on left side. 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   Blood cultures on admission + Enterococcus faecium - VRE per BCID. Urine culture with Klebsiella and Proteus. ID guiding antibiotic stewardship.     Patient admitted with unstageable pressure ulcer of sacrum; seen by inpatient wound nurse and treatment was iniatated, wound medicine consulted for evaluation and possible debridement.   No family present at time of assessment, all information gained through chart review. In June 2024 appears that patient was seen by wound  for sacral pressure ulcer, no visit notes or images availabe from this time frame. First image of sacral region in May 2024 with wound of sacrum/coccyx. In July 2024 images appears that wound had healed with remaining dark discoloration of sacrum/buttocks and then again noted with wound in images of late August 2024; appears to have evolved and worsened up to this point.  Initial evaluation on 10/22/24 - several pressure ulcers with most concerning is the sacral/coccyx unstageable  ulcer. Also with fecal incontinence which is contaminating the wound as it is veryc close to the anus. He is contracted and we were unable to get full visualization and positioning for bedside debridement. Recommendations made for palliative care consultation  to discuss overall goals of care.   Palliative care consulted and discussion held with family in which they have decided to continue with treatment.   Bedside debridement of sacral ulcer with Surgery on 11/2/24.   Wound Vac applied on 11/4/24  ID continues to follow, repeat Bcx in process from 11/8; resp cx from 11/1 with Acineto and Pseudomonas - continues guiding antibiotic stewardship   11/11/24 - wound re-check today. Met patient in room IN02, he is resting with eyes closed, non-verbal with trach on mechanical ventilation. He is on ICU low air loss bed with bilateral heel boots on, wedge under his left side. Accompanied by ICU nurse as well as inpatient wound nurse. Difficult to reposition because of contractures.  No acute distress. Plans for family meeting this afternoon concerning poor overall outlook and consideration of palliative care.                Hospital Course:   No notes on file      Follow-up For: Procedure(s) (LRB):  EGD w/ Jtube placement (N/A)    Post-Operative Day: 12 Days Post-Op    Scheduled Meds:   amiodarone  200 mg Per G Tube Daily    atorvastatin  10 mg Per G Tube Daily    ciprofloxacin HCl  750 mg Oral Q12H    metoprolol tartrate  25 mg Per G Tube BID    metroNIDAZOLE  500 mg Oral Q8H    pantoprazole  40 mg Intravenous Daily    QUEtiapine  50 mg Per G Tube BID    rivaroxaban  20 mg Per G Tube Nightly    sodium chloride 3%  4 mL Nebulization Q8H    sulfamethoxazole-trimethoprim 800-160mg  1 tablet Oral BID    tobramycin (PF)  300 mg Nebulization Q12H     Continuous Infusions:  PRN Meds:  Current Facility-Administered Medications:     0.9%  NaCl infusion (for blood administration), , Intravenous, Q24H PRN    acetaminophen, 650 mg,  Per G Tube, Q6H PRN    dextromethorphan-guaiFENesin  mg/5 ml, 10 mL, Per G Tube, Q4H PRN    dextrose 10%, 12.5 g, Intravenous, PRN    dextrose 10%, 25 g, Intravenous, PRN    glucagon (human recombinant), 1 mg, Intramuscular, PRN    hydrALAZINE, 10 mg, Intravenous, Q4H PRN    metoclopramide HCl, 10 mg, Oral, Q8H PRN    naloxone, 0.02 mg, Intravenous, PRN    sodium chloride 0.9%, 10 mL, Intravenous, Q12H PRN    Review of Systems   Unable to perform ROS: Patient nonverbal     Objective:     Vital Signs (Most Recent):  Temp: 98.9 °F (37.2 °C) (11/11/24 1200)  Pulse: 79 (11/11/24 1411)  Resp: (!) 24 (11/11/24 1411)  BP: 136/83 (11/11/24 1300)  SpO2: 96 % (11/11/24 1411) Vital Signs (24h Range):  Temp:  [98.7 °F (37.1 °C)-100 °F (37.8 °C)] 98.9 °F (37.2 °C)  Pulse:  [77-94] 79  Resp:  [23-34] 24  SpO2:  [90 %-100 %] 96 %  BP: (110-141)/() 136/83     Weight: 69.1 kg (152 lb 5.4 oz)  Body mass index is 22.49 kg/m².     Physical Exam  Vitals reviewed.   Constitutional:       Comments: Eyes closed, appears to be resting comfortably        HENT:      Head:      Comments: Right bone flap      Neck:      Comments: Tracheostomy   Pulmonary:      Comments: Mechanical ventilation    Abdominal:      Comments: PEG/J-Tube   Skin:     General: Skin is warm and dry.      Capillary Refill: Capillary refill takes less than 2 seconds.      Findings: Wound present.             Comments:     Left 1st and 2nd toe abrasions - JAYME   Neurological:      Comments: Left upper and lower extremity contracted  Moves RUE       Sacrum: 8.2 x 10 x 2.6 cm     with undermining from 1-5 o'clock and from 7-9 o'clock, 1 cm. Tunneling at 3 o'clock 2.6 cm      Left medial knee 0.2 x 0.1 cm - Lateral knee: 0.8 x 1 x 0.1 cm       Left lateral ankle: 0.8 x 0.8 x 0.1 cm              Laboratory:  A1C:   Recent Labs   Lab 08/26/24  1221   HGBA1C 5.3       BMP:   Recent Labs   Lab 11/11/24  0352      K 4.1      CO2 32*   BUN 19.8   CREATININE  0.51*   CALCIUM 8.4*       CBC:   Recent Labs   Lab 11/11/24 0352   WBC 8.81   RBC 3.01*   HGB 8.4*   HCT 26.8*      MCV 89.0   MCH 27.9   MCHC 31.3*     CMP:   Recent Labs   Lab 11/11/24 0352   CALCIUM 8.4*   ALBUMIN 1.7*      K 4.1   CO2 32*      BUN 19.8   CREATININE 0.51*   ALKPHOS 73   ALT 15   AST 18   BILITOT 0.5       LFTs:   Recent Labs   Lab 11/11/24 0352   ALT 15   AST 18   ALKPHOS 73   BILITOT 0.5   ALBUMIN 1.7*       Microbiology Results (last 7 days)       Procedure Component Value Units Date/Time    Blood Culture [[id number]]  (Normal) Collected: 11/08/24 0839    Order Status: Completed Specimen: Blood Updated: 11/11/24 1102     Blood Culture No Growth At 72 Hours    Blood Culture [[id number]]  (Normal) Collected: 11/08/24 0929    Order Status: Completed Specimen: Blood Updated: 11/11/24 1102     Blood Culture No Growth At 72 Hours    Respiratory Culture [[id number]]  (Abnormal)  (Susceptibility) Collected: 11/01/24 1619    Order Status: Completed Specimen: Respiratory from Sputum, Expectorated Updated: 11/11/24 0618     Respiratory Culture Many ACINETOBACTER BAUMANNII      Many Pseudomonas aeruginosa     GRAM STAIN Quality 2+      Many Gram Negative Rods      Few Yeast              Diagnostic Results:  I have reviewed all pertinent imaging results/findings within the past 24 hours.    Assessment/Plan:     Unstageable pressure ulcer of sacrum - present on admission , bedside debridement per surgery 11/2/214; wound vac applied on 11/4/24 - down to level of bone - with signs of clinical osteomyelitis   Stage 3 pressure ulcer of left knee - present on admission  Stage 4 pressure ulcer of left lateral ankle - present on admission  CVA with residual cognitive/motor deficits; contractures   VRE bacteremia  Klebsiella bacteremia  Trach/vent and PEG tube dependent           PLAN:     Chart reviewed, patient examined and wounds assessed.  Opinion: Wounds re assessed today, s/p wound vac  application on 11/4/24. Still with yellow slough covering depth of wound. No obvious signs of infection. Continue Wound Vac.   Left knee and ankle wounds without signs of infection. Continue to cleanse with Vashe, apply mupirocin ointment and adaptic or Versatel then cover with foam border daily.    Continue to pad left side rail where knee rubs to prevent further breakdown.   Wound care orders: Wound Vac - If it comes off or looses suction remove and resume previous wound care orders - Sacrum - cleanse with Vashe, apply Vashe moistened Mesalt to wound bed, cover with ABD pad and secure with cloth tape, BID and PRN.   Monitor for signs & symptoms of deterioration  Offloading of sacrum/buttocks/heels at all times: YOLETTE mattress, turning q 2 hrs; use of wedges and heel offloading devices to be used at all times while in bed; ( CHATA Zavala). This needs to be reinforced by every staff nurse caring for patient on every shift of every day.  Incontinence: control moisture/wound contamination: No briefs; use things such as purewick or underwood catheter; rectal tube  Nutrition: Follow RD  recommendations for tube feeds; GI following for frequent regurgitation of feeds, J-tube placement w/EGD on 10/30/24.   Will try to follow weekly while admitted, but every nurse assigned to patient on every shift of every day needs to address daily wound care dressing changes and offloading modalities including using heel offloading devices, wedges, YOLETTE mattress etc  Discussed with patient as well as nurse caring for patient today           The time spent including preparing to see the patient, obtaining patient history and assessment, evaluation of the plan of care, patient/caregiver counseling and education, orders, documentation, coordination of care, and other professional medical management activities for today's encounter was 60 minutes              TRUMAN Romeo  Wound Care  [facility] - [facility] ICU

## 2024-11-11 NOTE — PROGRESS NOTES
Infectious Disease  Progress Note    Patient Name: Delio Daniel Jr.   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 On admission, noted to be hypotensive with Afib and RVR, recently had positive urine culture with Klebsiella and Proteus. He also has an advanced sacral ulcer. He was noted to have bacteremia with 2/4 of admission blood cultures being positive for Enterococcus faecium and ultimately noting it is VRE per BCID. ID consulted for assistance in management.      Bacteremia  VRE bacteremia  Klebsiella bacteremia  CVA with residual cognitive deficits, motor deficits  Tracheostomy and PEG tube dependent  Sacral ulcer   Recurrent UTI  Pacemaker In place  History of V fib arrest     10/25 - afebrile. GOC ongoing. Blood is clear for 72 hrs, continue current regimen for #14 days, ID will sign off. If any changes in plan please call back   11/08:  Patient completed course of meropenem and linezolid on 11/05, started having worsening leukocytosis and had an episode of fever over the past 2 days.  ID reconsulted  Patient has multiple potential sources for infection, most concerning are Pulmonary, sacral ulcer, respiratory cultures noted.  He continues to be on minimal O2 requirements on the vent and does not appear to be in respiratory distress.  He does have a chest x-ray with concern of  new infiltrates  0/25 - afebrile. GOC ongoing. Blood is clear for 72 hrs, continue current regimen for #14 days, ID will sign off. If any changes in plan please call back   11/08:  Patient completed course of meropenem and linezolid on 11/05, started having worsening leukocytosis and had an episode of fever over the past 2 days.  ID reconsulted  Patient has multiple potential sources for infection, most concerning are Pulmonary, sacral ulcer, respiratory cultures noted.  He continues to be on minimal O2 requirements on the vent and does not appear to be in respiratory distress.  He does have a chest x-ray with concern of new infiltrates  11/11- afebrile.  BCx in process.  Resp cx 11/01 with Acineto + Pseudomonas in wound, add tmp/smx + cipro/metro. Pan CT pending. Family meeting today    Recommendations:  - currently on cipro/metro  - currently on tmp/smx  - pending CT scan of the chest abdomen and pelvis   - repeat blood cultures   - poor baseline function, remains at risk for infections with MDROS, with poor long term prognosis     Discussed and seen with RN    Thank you for your consult. ID will continue following. Please contact us with any questions or concerns.    Subjective:     Principal Problem: UTI (urinary tract infection)     Interval History:   No changes to overall clinical condition, remains obtunded and unable to participate in evaluation    Review of Systems   Review of Systems   Unable to perform ROS: Medical condition        Objective:     Vital Signs (Most Recent):  Temp: 98.7 °F (37.1 °C) (11/11/24 0400)  Pulse: 86 (11/11/24 1010)  Resp: (!) 26 (11/11/24 1010)  BP: 133/80 (11/11/24 0800)  SpO2: (!) 93 % (11/11/24 1010)  Vital Signs (24h Range):  Temp:  [98.7 °F (37.1 °C)-100 °F (37.8 °C)] 98.7 °F (37.1 °C)  Pulse:  [69-94] 86  Resp:  [23-34] 26  SpO2:  [90 %-100 %] 93 %  BP: (110-137)/() 133/80      Weight:   Wt Readings from Last 1 Encounters:   10/21/24 69.1 kg (152 lb 5.4 oz)      Body  mass index is Body mass index is 22.49 kg/m².     Estimated Creatinine Clearance: Estimated Creatinine Clearance: 135.5 mL/min (A) (based on SCr of 0.51 mg/dL (L)).     Lines/Drains/Airways       Drain  Duration                  Rectal Tube 10/25/24 2030 16 days         Gastrostomy/Enterostomy 10/30/24 1200 Gastrostomy-jejunostomy feeding 11 days    Male External Urinary Catheter 11/08/24 1540 2 days              Airway  Duration             Adult Surgical Airway 08/19/24 0120 Shiley Extra Large Cuffed Distal 6.0/ 75mm 84 days              Peripheral Intravenous Line  Duration                  Midline Catheter - Single Lumen 10/20/24 1530 Right 21 days         Peripheral IV - Single Lumen 10/20/24 1600 18 G Anterior;Distal;Left Upper Arm 21 days                     Physical Exam  Physical Exam  Constitutional:       Appearance: He is ill-appearing (Chronically ill-appearing).      Comments: Minimally interacting   HENT:      Head: Normocephalic and atraumatic.      Mouth/Throat:      Pharynx: No oropharyngeal exudate or posterior oropharyngeal erythema.   Eyes:      Extraocular Movements: Extraocular movements intact.      Pupils: Pupils are equal, round, and reactive to light.   Cardiovascular:      Rate and Rhythm: Normal rate and regular rhythm.      Heart sounds: No murmur heard.  Pulmonary:      Effort: No respiratory distress.      Breath sounds: No wheezing, rhonchi or rales.      Comments: Tracheostomy in place, ON VENT  Abdominal:      General: Bowel sounds are normal. There is no distension.      Palpations: Abdomen is soft.      Tenderness: There is no abdominal tenderness.      Comments: Peg tube in place   Genitourinary:     Comments: Fernandez   Musculoskeletal:         General: No swelling or tenderness.      Cervical back: Neck supple. No rigidity or tenderness.      Comments: Deep sacral ulcer ongoing   Lymphadenopathy:      Cervical: No cervical adenopathy.   Skin:     Findings: No lesion or rash.    Neurological:      Mental Status: He is alert.      Comments: At baseline, nonverbal, interaction is minimal but alert and responsive          Significant Labs: CBC:   Recent Labs   Lab 11/10/24  0251 11/11/24  0352   WBC 11.08 8.81   HGB 9.5* 8.4*   HCT 31.7* 26.8*    273     CMP:   Recent Labs   Lab 11/10/24  0251 11/11/24  0352    143   K 3.8 4.1    107   CO2 30 32*   BUN 21.9 19.8   CREATININE 0.55* 0.51*   CALCIUM 8.2* 8.4*   ALBUMIN 1.7* 1.7*   BILITOT 0.6 0.5   ALKPHOS 78 73   AST 25 18   ALT 18 15       Microbiology Results (last 7 days)       Procedure Component Value Units Date/Time    Respiratory Culture [[id number]]  (Abnormal)  (Susceptibility) Collected: 11/01/24 1619    Order Status: Completed Specimen: Respiratory from Sputum, Expectorated Updated: 11/11/24 0618     Respiratory Culture Many ACINETOBACTER BAUMANNII      Many Pseudomonas aeruginosa     GRAM STAIN Quality 2+      Many Gram Negative Rods      Few Yeast    Blood Culture [[id number]]  (Normal) Collected: 11/08/24 0839    Order Status: Completed Specimen: Blood Updated: 11/10/24 1102     Blood Culture No Growth At 48 Hours    Blood Culture [[id number]]  (Normal) Collected: 11/08/24 0929    Order Status: Completed Specimen: Blood Updated: 11/10/24 1102     Blood Culture No Growth At 48 Hours             Significant Imaging: I have reviewed all pertinent imaging results/findings within the past 24 hours.      Kenneth Bhardwaj MD  Infectious Disease  [facility]

## 2024-11-11 NOTE — PROGRESS NOTES
Infectious Disease  Progress Note    Patient Name: Delio Daniel Jr.   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 On admission, noted to be hypotensive with Afib and RVR, recently had positive urine culture with Klebsiella and Proteus. He also has an advanced sacral ulcer. He was noted to have bacteremia with 2/4 of admission blood cultures being positive for Enterococcus faecium and ultimately noting it is VRE per BCID. ID consulted for assistance in management.      Bacteremia  VRE bacteremia  Klebsiella bacteremia  CVA with residual cognitive deficits, motor deficits  Tracheostomy and PEG tube dependent  Sacral ulcer   Recurrent UTI  Pacemaker In place  History of V fib arrest     10/25 - afebrile. GOC ongoing. Blood is clear for 72 hrs, continue current regimen for #14 days, ID will sign off. If any changes in plan please call back   ---  11/08:  Patient completed course of meropenem and linezolid on 11/05, started having worsening leukocytosis and had an episode of fever over the past 2 days.  ID reconsulted  Patient has multiple potential sources for infection, most concerning are Pulmonary, sacral ulcer, respiratory cultures noted.  He continues to be on minimal O2 requirements on the vent and does not appear to be in respiratory distress.  He does have a chest x-ray with  concern of new infiltrates  11/11- had fever when abx were stopped. Family discussion today. Cx with Acineto + Pseudomonas, continue short course as outlined below. ID will sign off, call back if needed.     Recommendations:  - if fever reoccurs obtain CT scan of the chest abdomen and pelvis   - currently on cipro, compete #14 days, 11/8 to 11/22  - currently on tmp/smx, complete #14 days, 11/8 to 11/22   - continued risk for infections      Discussed with patient  Discussed and seen with RN     Raina Brennan MD, MPH  [facility]        Subjective:     Principal Problem: UTI (urinary tract infection)     Interval History:   No changes to overall clinical condition, remains obtunded and unable to participate in evaluation    Review of Systems   Review of Systems   Unable to perform ROS: Medical condition        Objective:     Vital Signs (Most Recent):  Temp: 98.7 °F (37.1 °C) (11/11/24 0400)  Pulse: 82 (11/11/24 0736)  Resp: (!) 34 (11/11/24 0736)  BP: 129/86 (11/11/24 0700)  SpO2: (!) 92 % (11/11/24 0736)  Vital Signs (24h Range):  Temp:  [98.7 °F (37.1 °C)-100 °F (37.8 °C)] 98.7 °F (37.1 °C)  Pulse:  [69-94] 82  Resp:  [23-34] 34  SpO2:  [90 %-100 %] 92 %  BP: (110-137)/() 129/86      Weight:   Wt Readings from Last 1 Encounters:   10/21/24 69.1 kg (152 lb 5.4 oz)      Body mass index is Body mass index is 22.49 kg/m².     Estimated Creatinine Clearance: Estimated Creatinine Clearance: 135.5 mL/min (A) (based on SCr of 0.51 mg/dL (L)).     Lines/Drains/Airways       Drain  Duration                  Rectal Tube 10/25/24 2030 16 days         Gastrostomy/Enterostomy 10/30/24 1200 Gastrostomy-jejunostomy feeding 11 days    Male External Urinary Catheter 11/08/24 1540 2 days              Airway  Duration             Adult Surgical Airway 08/19/24 0120 Shiley Extra Large Cuffed Distal 6.0/ 75mm 84 days              Peripheral Intravenous Line  Duration                  Midline  Catheter - Single Lumen 10/20/24 1530 Right 21 days         Peripheral IV - Single Lumen 10/20/24 1600 18 G Anterior;Distal;Left Upper Arm 21 days                     Physical Exam  Physical Exam  Constitutional:       Appearance: He is ill-appearing (Chronically ill-appearing).      Comments: Minimally interacting   HENT:      Head: Normocephalic and atraumatic.      Mouth/Throat:      Pharynx: No oropharyngeal exudate or posterior oropharyngeal erythema.   Eyes:      Extraocular Movements: Extraocular movements intact.      Pupils: Pupils are equal, round, and reactive to light.   Cardiovascular:      Rate and Rhythm: Normal rate and regular rhythm.      Heart sounds: No murmur heard.  Pulmonary:      Effort: No respiratory distress.      Breath sounds: No wheezing, rhonchi or rales.      Comments: Tracheostomy in place, ON VENT  Abdominal:      General: Bowel sounds are normal. There is no distension.      Palpations: Abdomen is soft.      Tenderness: There is no abdominal tenderness.      Comments: Peg tube in place   Genitourinary:     Comments: Fernandez   Musculoskeletal:         General: No swelling or tenderness.      Cervical back: Neck supple. No rigidity or tenderness.      Comments: Deep sacral ulcer ongoing   Lymphadenopathy:      Cervical: No cervical adenopathy.   Skin:     Findings: No lesion or rash.   Neurological:      Mental Status: He is alert.      Comments: At baseline, nonverbal, interaction is minimal but alert and responsive          Significant Labs: CBC:   Recent Labs   Lab 11/10/24  0251 11/11/24  0352   WBC 11.08 8.81   HGB 9.5* 8.4*   HCT 31.7* 26.8*    273     CMP:   Recent Labs   Lab 11/09/24  0842 11/10/24  0251 11/11/24  0352    144 143   K 3.9 3.8 4.1    106 107   CO2 30 30 32*   BUN 22.0 21.9 19.8   CREATININE 0.56* 0.55* 0.51*   CALCIUM 7.8* 8.2* 8.4*   ALBUMIN 1.7* 1.7* 1.7*   BILITOT 0.8 0.6 0.5   ALKPHOS 82 78 73   AST 29 25 18   ALT 17 18 15        Microbiology Results (last 7 days)       Procedure Component Value Units Date/Time    Respiratory Culture [[id number]]  (Abnormal)  (Susceptibility) Collected: 11/01/24 1619    Order Status: Completed Specimen: Respiratory from Sputum, Expectorated Updated: 11/11/24 0618     Respiratory Culture Many ACINETOBACTER BAUMANNII      Many Pseudomonas aeruginosa     GRAM STAIN Quality 2+      Many Gram Negative Rods      Few Yeast    Blood Culture [[id number]]  (Normal) Collected: 11/08/24 0839    Order Status: Completed Specimen: Blood Updated: 11/10/24 1102     Blood Culture No Growth At 48 Hours    Blood Culture [[id number]]  (Normal) Collected: 11/08/24 0929    Order Status: Completed Specimen: Blood Updated: 11/10/24 1102     Blood Culture No Growth At 48 Hours             Significant Imaging: I have reviewed all pertinent imaging results/findings within the past 24 hours.      Kenneth Bhardwaj MD  Infectious Disease  [facility]

## 2024-11-12 LAB — POCT GLUCOSE: 123 MG/DL (ref 70–110)

## 2024-11-12 PROCEDURE — 87040 BLOOD CULTURE FOR BACTERIA: CPT | Performed by: HOSPITALIST

## 2024-11-12 PROCEDURE — 25000003 PHARM REV CODE 250

## 2024-11-12 PROCEDURE — 25000003 PHARM REV CODE 250: Performed by: GENERAL PRACTICE

## 2024-11-12 PROCEDURE — 99900026 HC AIRWAY MAINTENANCE (STAT)

## 2024-11-12 PROCEDURE — 99900035 HC TECH TIME PER 15 MIN (STAT)

## 2024-11-12 PROCEDURE — 25000003 PHARM REV CODE 250: Performed by: HOSPITALIST

## 2024-11-12 PROCEDURE — 63600175 PHARM REV CODE 636 W HCPCS

## 2024-11-12 PROCEDURE — 99233 SBSQ HOSP IP/OBS HIGH 50: CPT | Mod: ,,, | Performed by: HOSPITALIST

## 2024-11-12 PROCEDURE — 25000242 PHARM REV CODE 250 ALT 637 W/ HCPCS: Performed by: INTERNAL MEDICINE

## 2024-11-12 PROCEDURE — 27200966 HC CLOSED SUCTION SYSTEM

## 2024-11-12 PROCEDURE — 99900031 HC PATIENT EDUCATION (STAT)

## 2024-11-12 PROCEDURE — 27000207 HC ISOLATION

## 2024-11-12 PROCEDURE — 25000003 PHARM REV CODE 250: Performed by: NURSE PRACTITIONER

## 2024-11-12 PROCEDURE — 36415 COLL VENOUS BLD VENIPUNCTURE: CPT | Performed by: HOSPITALIST

## 2024-11-12 PROCEDURE — 20000000 HC ICU ROOM

## 2024-11-12 PROCEDURE — 94003 VENT MGMT INPAT SUBQ DAY: CPT

## 2024-11-12 PROCEDURE — 94640 AIRWAY INHALATION TREATMENT: CPT

## 2024-11-12 PROCEDURE — 94760 N-INVAS EAR/PLS OXIMETRY 1: CPT

## 2024-11-12 PROCEDURE — 94668 MNPJ CHEST WALL SBSQ: CPT

## 2024-11-12 PROCEDURE — 27100171 HC OXYGEN HIGH FLOW UP TO 24 HOURS

## 2024-11-12 PROCEDURE — 25500020 PHARM REV CODE 255: Performed by: HOSPITALIST

## 2024-11-12 RX ORDER — DIATRIZOATE MEGLUMINE AND DIATRIZOATE SODIUM 660; 100 MG/ML; MG/ML
30 SOLUTION ORAL; RECTAL
Status: DISCONTINUED | OUTPATIENT
Start: 2024-11-12 | End: 2024-11-18 | Stop reason: HOSPADM

## 2024-11-12 RX ADMIN — AMIODARONE HYDROCHLORIDE 200 MG: 200 TABLET ORAL at 08:11

## 2024-11-12 RX ADMIN — PANTOPRAZOLE SODIUM 40 MG: 40 INJECTION, POWDER, LYOPHILIZED, FOR SOLUTION INTRAVENOUS at 08:11

## 2024-11-12 RX ADMIN — SULFAMETHOXAZOLE AND TRIMETHOPRIM 1 TABLET: 800; 160 TABLET ORAL at 08:11

## 2024-11-12 RX ADMIN — Medication 4 ML: at 03:11

## 2024-11-12 RX ADMIN — METRONIDAZOLE 500 MG: 500 TABLET ORAL at 06:11

## 2024-11-12 RX ADMIN — Medication 4 ML: at 12:11

## 2024-11-12 RX ADMIN — CIPROFOLXACIN 750 MG: 250 TABLET ORAL at 08:11

## 2024-11-12 RX ADMIN — METOPROLOL TARTRATE 25 MG: 25 TABLET, FILM COATED ORAL at 08:11

## 2024-11-12 RX ADMIN — METOCLOPRAMIDE HYDROCHLORIDE 10 MG: 5 SOLUTION ORAL at 01:11

## 2024-11-12 RX ADMIN — ACETAMINOPHEN 650 MG: 325 TABLET ORAL at 04:11

## 2024-11-12 RX ADMIN — IOHEXOL 100 ML: 350 INJECTION, SOLUTION INTRAVENOUS at 03:11

## 2024-11-12 RX ADMIN — QUETIAPINE FUMARATE 50 MG: 25 TABLET ORAL at 08:11

## 2024-11-12 RX ADMIN — Medication 4 ML: at 08:11

## 2024-11-12 RX ADMIN — RIVAROXABAN 20 MG: 10 TABLET, FILM COATED ORAL at 08:11

## 2024-11-12 RX ADMIN — ATORVASTATIN CALCIUM 10 MG: 10 TABLET, FILM COATED ORAL at 08:11

## 2024-11-12 NOTE — PROGRESS NOTES
Attempted to follow up with patient's daughter Beth via telephone to provide an opportunity to discuss goals of care further and ask any questions post meeting completed by Palliative Care Provide, Dr. Haney on 11/11. No answer to telephone call, message left informing of palliative care team's attempt to follow up, no patient identifiers left on message. Will attempt to follow up tomorrow regarding goals of care.

## 2024-11-12 NOTE — PROGRESS NOTES
[facility] - 7 North ICU  Wound Care    Patient Name:  Delio Daniel Jr.   MRN:  [id number]  Date: 11/12/2024  Diagnosis: UTI (urinary tract infection)    History:     Past Medical History:   Diagnosis Date    Arthritis     Atrial fibrillation     BPH (benign prostatic hyperplasia)     Cardiac arrest     Coronary artery disease     Cyst, kidney, acquired     Diverticulosis     Hyperlipidemia     Hypertension     MI (myocardial infarction)     Obesity     Steatosis of liver     Stroke        Social History     Socioeconomic History    Marital status:     Number of children: 9   Occupational History    Occupation: retired   Tobacco Use    Smoking status: Never    Smokeless tobacco: Never   Substance and Sexual Activity    Alcohol use: Not Currently    Drug use: Not Currently    Sexual activity: Not Currently     Partners: Female     Social Drivers of Health     Financial Resource Strain: Patient Unable To Answer (10/21/2024)    Overall Financial Resource Strain (CARDIA)     Difficulty of Paying Living Expenses: Patient unable to answer   Food Insecurity: Patient Unable To Answer (10/21/2024)    Hunger Vital Sign     Worried About Running Out of Food in the Last Year: Patient unable to answer     Ran Out of Food in the Last Year: Patient unable to answer   Transportation Needs: Patient Unable To Answer (10/21/2024)    TRANSPORTATION NEEDS     Transportation : Patient unable to answer   Physical Activity: Sufficiently Active (8/5/2024)    Exercise Vital Sign     Days of Exercise per Week: 5 days     Minutes of Exercise per Session: 30 min   Stress: Patient Unable To Answer (10/21/2024)    [employer] - Occupational Stress Questionnaire     Feeling of Stress : Patient unable to answer   Housing Stability: Patient Unable To Answer (10/21/2024)    Housing Stability Vital Sign     Unable to Pay for Housing in the Last Year: Patient unable to answer     Homeless in the Last  Year: Patient unable to answer       Precautions:     Allergies as of 10/20/2024    (No Known Allergies)       [facility] Assessment Details/Treatment        11/11/24 1026   WO Assessment   Visit Date 11/12/24   Visit Time 1026   Consult Type Follow Up   [facility] Speciality Wound   WO List wound vac   Wound pressure   Intervention chart review;assessed;applied   Teaching on-going        Wound 10/20/24 2100 Pressure Injury Sacral spine   Date First Assessed/Time First Assessed: 10/20/24 2100   Present on Original Admission: Yes  Primary Wound Type: Pressure Injury  Location: Sacral spine  Is this injury device related?: No   Pressure Injury Stage 4   Dressing Appearance Intact;Moist drainage   Drainage Amount Small   Drainage Characteristics/Odor Serosanguineous   Appearance Pink;Red;Yellow   Tissue loss description Full thickness   Black (%), Wound Tissue Color 0 %   Red (%), Wound Tissue Color 50 %   Yellow (%), Wound Tissue Color 50 %   Periwound Area Dry;Pink;Pale white;Scar tissue   Wound Edges Irregular;Jagged   Wound Length (cm) 8.2 cm   Wound Width (cm) 10 cm   Wound Depth (cm) 2.6 cm   Wound Volume (cm^3) 213.2 cm^3   Wound Surface Area (cm^2) 82 cm^2   Undermining (depth (cm)/location) UM 4-5 @1 cm   Care Cleansed with:;Antimicrobial agent  (vashe)   Dressing Removed;Applied  (NPWT)     [facility] follow up for removal and reapplication of wound vac to sacral wound. Wound Care NPMarcello assisted me with wound vac placement along with pt.'s nurse, Aishwarya. No family at bedside. Pt. Trached, on ventilator. Cleaned sacral wound with vashe. Removed and re-applied NPWT using 1 piece of versatel, 1 black foam on top of wound tracked to another black foam. Seal intact w/ no leakage or blockage detected.   If wound vac does not hold, please continue previous wound care routine order: Sacrum: clean w/ vashe, apply vashe moistened mesalt to wound bed, cover w/ dry gauze, abd pad, and secure w/ tape. BID/PRN if soilage. Will follow  up Friday for wound vac change w/ wound care NP.     11/12/2024

## 2024-11-12 NOTE — PLAN OF CARE
Problem: Adult Inpatient Plan of Care  Goal: Plan of Care Review  Outcome: Progressing  Goal: Patient-Specific Goal (Individualized)  Outcome: Progressing  Goal: Optimal Comfort and Wellbeing  Outcome: Progressing     Problem: Sepsis/Septic Shock  Goal: Absence of Infection Signs and Symptoms  Outcome: Progressing     Problem: Wound  Goal: Skin Health and Integrity  Outcome: Progressing

## 2024-11-12 NOTE — PROGRESS NOTES
Pulmonary & Critical Care Medicine   Progress Note      Presenting History/HPI:    Patient is a 70 y/o male with extensive PMH who presented from NH on 10/20/24 with decreased responsiveness, severe sepsis, and recurrent UTI. 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     Patient transferred to ED from [facility] with lethargy and tachycardia. Family at bedside reports patient is nonverbal at baseline but typically more alert. In the ED, patient is febrile, tachycardic with -190s, tachypneic, /60. EKG shows Afib with RVR. Diltiazem drip started in ED. Labs are significant for WBC of 16.5, lactic acid of 3.3, Cr 1.48, BUN 45.3, Na 155, K+ 5.1, troponin elevated at 0.118. UA with evidence of UTI. Of note, pt was being treated outpatient for a UTI, currently on day 4 of 7 day Rocephin course. Urine culture 10/16/24 with multidrug resistant Klebsiella pneumonia and Proteus mirabilis with susceptibility to Cefepime. Patient received 3L of NS and initiated on Vanc and Cefepime in ED. Admitted to the ICU on mechanical ventilation via trach.    Admitted to the ICU on 10/20   Peg tube extension to J-tube on 10/30    Interval History:  NAEON. Vitals shows patient febrile to 101.4 overnight. O2 sats remain stable on 35% mechanical ventilation via trach. Documented urine output 2050 cc over past 24 hours. Telemetry continues to show rate controlled atrial fibrillation. Original sputum culture positive for Acinetobacter baumannii  sensitive only to gentamicin and tobramycin and Pseudomonas aeruginosa only sensitive to ciprofloxacin gentamicin and tobramycin. Original blood cultures negative. Completed  treatment with IV linezolid and meropenem (11/05/2024). At this point in time patient is likely colonized with acinetobacter baumannii and Pseudomonas aeruginosa. Patient on HOD 19 developed repeat episode of fever and uptrending leukocytosis. CXR obtained due to concern for repeat development of pneumonia given lab work and prior episodes of fever. CXR on personal read showed new left sided infiltrate. Given above, ID re-consulted. Initiated IV cipro 500 mg bid, ds bactrim bid, metronidazole 500 mg tid, and inhaled tobramycin for repeat pneumonia which he continues on at this time. Repeat blood cultures NTD. Consulted palliative care for assistance with goals of care discussions as patient long term prognosis is poor even with treatment. Family discussed goals of care with palliative yesterday, at this time still expresses desire for patient to remain full code and continue all aggressive abx treatments.      Scheduled Medications:    amiodarone  200 mg Per G Tube Daily    atorvastatin  10 mg Per G Tube Daily    ciprofloxacin HCl  750 mg Oral Q12H    metoprolol tartrate  25 mg Per G Tube BID    metroNIDAZOLE  500 mg Oral Q8H    pantoprazole  40 mg Intravenous Daily    QUEtiapine  50 mg Per G Tube BID    rivaroxaban  20 mg Per G Tube Nightly    sodium chloride 3%  4 mL Nebulization Q8H    sulfamethoxazole-trimethoprim 800-160mg  1 tablet Oral BID    tobramycin (PF)  300 mg Nebulization Q12H       PRN Medications:     Current Facility-Administered Medications:     0.9%  NaCl infusion (for blood administration), , Intravenous, Q24H PRN    acetaminophen, 650 mg, Per G Tube, Q6H PRN    dextromethorphan-guaiFENesin  mg/5 ml, 10 mL, Per G Tube, Q4H PRN    dextrose 10%, 12.5 g, Intravenous, PRN    dextrose 10%, 25 g, Intravenous, PRN    glucagon (human recombinant), 1 mg, Intramuscular, PRN    hydrALAZINE, 10 mg, Intravenous, Q4H PRN    metoclopramide HCl, 10 mg, Oral, Q8H PRN    naloxone, 0.02 mg, Intravenous, PRN     sodium chloride 0.9%, 10 mL, Intravenous, Q12H PRN      Infusions:          Fluid Balance:     Intake/Output Summary (Last 24 hours) at 11/12/2024 0624  Last data filed at 11/12/2024 0400  Gross per 24 hour   Intake 100 ml   Output 2350 ml   Net -2250 ml         Vital Signs:   Vitals:    11/12/24 0514   BP:    Pulse: 109   Resp: (!) 24   Temp:          Physical Exam  Vitals and nursing note reviewed.   Constitutional:       Appearance: He is ill-appearing.   HENT:      Head: Normocephalic.      Right Ear: External ear normal.      Left Ear: External ear normal.      Nose: Nose normal.      Mouth/Throat:      Mouth: Mucous membranes are moist.      Pharynx: Oropharynx is clear. No oropharyngeal exudate or posterior oropharyngeal erythema.   Eyes:      General: No scleral icterus.     Extraocular Movements: Extraocular movements intact.      Conjunctiva/sclera: Conjunctivae normal.      Pupils: Pupils are equal, round, and reactive to light.   Neck:      Comments: Tracheostomy in place, site clean and dry  Cardiovascular:      Rate and Rhythm: Normal rate and regular rhythm.      Pulses: Normal pulses.      Heart sounds: Normal heart sounds. No murmur heard.     No friction rub. No gallop.   Pulmonary:      Effort: Pulmonary effort is normal. No respiratory distress.      Breath sounds: Normal breath sounds. No stridor. No wheezing, rhonchi or rales.      Comments: On mechanical ventilation via tracheostomy tube, no dyssynchrony seen on mechanical ventilation, no accessory muscle use   Chest:      Chest wall: No tenderness.   Abdominal:      General: Abdomen is flat. Bowel sounds are normal. There is no distension.      Palpations: Abdomen is soft.      Tenderness: There is no abdominal tenderness. There is no rebound.      Comments: J-tube in place site clean and dry   Musculoskeletal:      Cervical back: Normal range of motion. No rigidity or tenderness.   Skin:     General: Skin is warm and dry.      Capillary  "Refill: Capillary refill takes less than 2 seconds.      Coloration: Skin is not jaundiced.      Findings: No bruising, erythema or rash.   Neurological:      Mental Status: Mental status is at baseline.      Comments: Nonverbal, opens eyes to loud name call, not moving any extremities randomly or on command   Psychiatric:      Comments: Unable to fully assess       Ventilator Settings  Vent Mode: A/C (11/12/24 0514)  Ventilator Initiated: Yes (10/20/24 1546)  Set Rate: 20 BPM (11/12/24 0514)  Vt Set: 500 mL (11/12/24 0514)  PEEP/CPAP: 8 cmH20 (11/12/24 0514)  Oxygen Concentration (%): 35 (11/12/24 0514)  Peak Airway Pressure: 18 cmH20 (11/12/24 0514)  Plateau Pressure: 3 cmH20 (11/01/24 0338)  Total Ve: 10.3 L/m (11/12/24 0514)  F/VT Ratio<105 (RSBI): (!) 54.05 (11/12/24 0514)      Laboratory Studies:   No results for input(s): "PH", "PCO2", "PO2", "HCO3", "POCSATURATED", "BE" in the last 24 hours.  No results for input(s): "WBC", "RBC", "HGB", "HCT", "PLT", "MCV", "MCH", "MCHC" in the last 24 hours.      No results for input(s): "GLUCOSE", "NA", "K", "CL", "CO2", "BUN", "CREATININE", "CALCIUM", "MG" in the last 24 hours.          Microbiology Data:   Microbiology Results (last 7 days)       Procedure Component Value Units Date/Time    Blood Culture [[id number]]  (Normal) Collected: 11/08/24 0839    Order Status: Completed Specimen: Blood Updated: 11/11/24 1102     Blood Culture No Growth At 72 Hours    Blood Culture [[id number]]  (Normal) Collected: 11/08/24 0929    Order Status: Completed Specimen: Blood Updated: 11/11/24 1102     Blood Culture No Growth At 72 Hours    Respiratory Culture [[id number]]  (Abnormal)  (Susceptibility) Collected: 11/01/24 1619    Order Status: Completed Specimen: Respiratory from Sputum, Expectorated Updated: 11/11/24 0618     Respiratory Culture Many ACINETOBACTER BAUMANNII      Many Pseudomonas aeruginosa     GRAM STAIN Quality 2+      Many Gram Negative Rods      Few Yeast      "         Imaging:   X-Ray Chest 1 View  Narrative: EXAMINATION:  XR CHEST 1 VIEW    CLINICAL HISTORY:  Pneumonia;    TECHNIQUE:  Single view of the chest    COMPARISON:  10/25/2024    FINDINGS:  The cardiomediastinal silhouette remains prominent.  Interval development of right upper lobe and left hilar opacifications.  Recommend continued follow-up.  Impression: Findings concerning for development of infectious process.  Recommend continued follow-up.    Electronically signed by: Jg Plunkett  Date:    11/08/2024  Time:    09:41          Assessment and Plan    Assessment:  Sepsis without shock with underlying VRE Enterococcus and ESBL Klebsiella bacteremia with Klebsiella and Proteus pneumonia on 10/20/10/24 (resolved)  -Acinetobacter and Pseudomonas positive sputum culture on 11/01/2024 with Acinetobacter sensitive to gentamicin and tobramycin and Pseudomonas sensitive to ciprofloxacin gentamicin and tobramycin, suspect colonization without leukocytosis or increased sputum production or worsening hypoxia  Repeat pneumonia without sepsis and/or septic shock (11/08/2024)  -CXR on personal read showed new left sided infiltrate.   -blood cultures NTD  Chronic hypoxemic respiratory failure with tracheostomy on permanent mechanical ventilatory support secondary to prior cerebrovascular accident with subsequent hemorrhagic stroke status post craniectomy   AFib with RVR (rate controlled)  Chronic sacral decubitus ulcer present on admission  -PEG tube feed intolerance status post J-tube extension with tolerance of tube feeds  Altered mental status due to the above intracranial process    Plan:  -titrate mechanical ventilation for ARDS net protocol   -supplement oxygen to maintain saturation 94-96%   -routine tracheostomy care   -tube feeds to goal with free water flushes as appropriate   -continues on digoxin, Lopressor, and amiodarone; remains in afib, nothing further to do for Afib  -continue IV cipro 500 mg bid, ds  bactrim bid, metronidazole 500 mg tid, and inhaled tobramycin per ID recs  -wound care for sacral decubitus ulcer debrided at bedside on 11/02 with wound VAC placement on 11/04, appreciate assistance from Wound Care and from General surgery  -palliative care consulted for goals of care discussion with family as overall prognosis remains poor; family desire to remain full code and continue all aggressive treatment at this time    DVT ppx/tx with Xarelto  GI ppx with protonix  Keep HOB elevated > 30*    32 minutes of critical care was time spent personally by me on the following activities: development of treatment plan with patient or surrogate and bedside caregivers, discussions with consultants, evaluation of patient's response to treatment, examination of patient, ordering and performing treatments and interventions, ordering and review of laboratory studies, ordering and review of radiographic studies, pulse oximetry, re-evaluation of patient's condition.  This critical care time did not overlap with that of any other provider or involve time for any procedures.     Eugene Patel MD  11/12/2024  Pulmonology/Critical Care

## 2024-11-12 NOTE — PROGRESS NOTES
Infectious Disease  Progress Note    Patient Name: Delio Daniel Jr.   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 On admission, noted to be hypotensive with Afib and RVR, recently had positive urine culture with Klebsiella and Proteus. He also has an advanced sacral ulcer. He was noted to have bacteremia with 2/4 of admission blood cultures being positive for Enterococcus faecium and ultimately noting it is VRE per BCID. ID consulted for assistance in management.      Bacteremia  VRE bacteremia  Klebsiella bacteremia  CVA with residual cognitive deficits, motor deficits  Tracheostomy and PEG tube dependent  Sacral ulcer   Recurrent UTI  Pacemaker In place  History of V fib arrest     10/25 - afebrile. GOC ongoing. Blood is clear for 72 hrs, continue current regimen for #14 days, ID will sign off. If any changes in plan please call back   11/08:  Patient completed course of meropenem and linezolid on 11/05, started having worsening leukocytosis and had an episode of fever over the past 2 days.  ID reconsulted  Patient has multiple potential sources for infection, most concerning are Pulmonary, sacral ulcer, respiratory cultures noted.  He continues to be on minimal O2 requirements on the vent and does not appear to be in respiratory distress.  He does have a chest x-ray with  concern of new infiltrates  0/25 - afebrile. GOC ongoing. Blood is clear for 72 hrs, continue current regimen for #14 days, ID will sign off. If any changes in plan please call back   11/08:  Patient completed course of meropenem and linezolid on 11/05, started having worsening leukocytosis and had an episode of fever over the past 2 days.  ID reconsulted  Patient has multiple potential sources for infection, most concerning are Pulmonary, sacral ulcer, respiratory cultures noted.  He continues to be on minimal O2 requirements on the vent and does not appear to be in respiratory distress.  He does have a chest x-ray with concern of new infiltrates  11/11- afebrile.  BCx in process.  Resp cx 11/01 with Acineto + Pseudomonas in wound, add tmp/smx + cipro/metro. Pan CT pending. Family meeting today  11/12 - febrile 101.4F. continue aggressive care. Obtain blood cx and pan CT.     Recommendations:  - currently on cipro/metro  - currently on tmp/smx  - pending CT scan of the chest abdomen and pelvis   - repeat blood cultures   - poor baseline function, remains at risk for infections with MDRO with  poor long term prognosis, family wishes to continue with agressive care    Discussed and seen with RN  Discussed with Palliative  Discussed with ICU team     Thank you for your consult. ID will continue following. Please contact us with any questions or concerns.    Subjective:     Principal Problem: UTI (urinary tract infection)     Interval History:   No changes to overall clinical condition, remains obtunded and unable to participate in evaluation    Review of Systems   Review of Systems   Unable to perform ROS: Medical condition        Objective:     Vital Signs (Most Recent):  Temp: (!) 100.4 °F (38 °C) (11/12/24 0800)  Pulse: 107 (11/12/24 0900)  Resp: (!) 24 (11/12/24 0900)  BP: 108/75 (11/12/24 0900)  SpO2: 100 % (11/12/24 0900)  Vital Signs (24h Range):  Temp:  [98.1 °F (36.7 °C)-101.4 °F (38.6 °C)] 100.4 °F (38  °C)  Pulse:  [] 107  Resp:  [20-36] 24  SpO2:  [78 %-100 %] 100 %  BP: ()/() 108/75      Weight:   Wt Readings from Last 1 Encounters:   10/21/24 69.1 kg (152 lb 5.4 oz)      Body mass index is Body mass index is 22.49 kg/m².     Estimated Creatinine Clearance: Estimated Creatinine Clearance: 135.5 mL/min (A) (based on SCr of 0.51 mg/dL (L)).     Lines/Drains/Airways       Drain  Duration                  Rectal Tube 10/25/24 2030 17 days         Gastrostomy/Enterostomy 10/30/24 1200 Gastrostomy-jejunostomy feeding 12 days    Male External Urinary Catheter 11/08/24 1540 3 days              Airway  Duration             Adult Surgical Airway 08/19/24 0120 Shiley Extra Large Cuffed Distal 6.0/ 75mm 85 days              Peripheral Intravenous Line  Duration                  Midline Catheter - Single Lumen 10/20/24 1530 Right 22 days                     Physical Exam  Physical Exam  Constitutional:       Appearance: He is not ill-appearing (Chronically ill-appearing).      Comments: Minimally interacting   HENT:      Head: Normocephalic and atraumatic.      Mouth/Throat:      Pharynx: No oropharyngeal exudate or posterior oropharyngeal erythema.   Eyes:      Extraocular Movements: Extraocular movements intact.      Pupils: Pupils are equal, round, and reactive to light.   Cardiovascular:      Rate and Rhythm: Normal rate and regular rhythm.      Heart sounds: No murmur heard.  Pulmonary:      Effort: No respiratory distress.      Breath sounds: No wheezing, rhonchi or rales.      Comments: Tracheostomy in place, ON VENT  Abdominal:      General: Bowel sounds are normal. There is no distension.      Palpations: Abdomen is soft.      Tenderness: There is no abdominal tenderness.      Comments: Peg tube in place   Genitourinary:     Comments: purewick  Rectal tube  Musculoskeletal:         General: No swelling or tenderness.      Cervical back: Neck supple. No rigidity or tenderness.      Comments: Deep  sacral ulcer ongoing   Lymphadenopathy:      Cervical: No cervical adenopathy.   Skin:     Findings: No lesion or rash.   Neurological:      Mental Status: He is alert. Mental status is at baseline.      Comments: At baseline, nonverbal, interaction is minimal but alert and responsive          Significant Labs: CBC:   Recent Labs   Lab 11/11/24 0352   WBC 8.81   HGB 8.4*   HCT 26.8*        CMP:   Recent Labs   Lab 11/11/24 0352      K 4.1      CO2 32*   BUN 19.8   CREATININE 0.51*   CALCIUM 8.4*   ALBUMIN 1.7*   BILITOT 0.5   ALKPHOS 73   AST 18   ALT 15       Microbiology Results (last 7 days)       Procedure Component Value Units Date/Time    Blood Culture [[id number]]  (Normal) Collected: 11/08/24 0839    Order Status: Completed Specimen: Blood Updated: 11/11/24 1102     Blood Culture No Growth At 72 Hours    Blood Culture [[id number]]  (Normal) Collected: 11/08/24 0929    Order Status: Completed Specimen: Blood Updated: 11/11/24 1102     Blood Culture No Growth At 72 Hours    Respiratory Culture [[id number]]  (Abnormal)  (Susceptibility) Collected: 11/01/24 1619    Order Status: Completed Specimen: Respiratory from Sputum, Expectorated Updated: 11/11/24 0618     Respiratory Culture Many ACINETOBACTER BAUMANNII      Many Pseudomonas aeruginosa     GRAM STAIN Quality 2+      Many Gram Negative Rods      Few Yeast             Significant Imaging: I have reviewed all pertinent imaging results/findings within the past 24 hours.      Kenneth Bhardwaj MD  Infectious Disease  [facility]

## 2024-11-13 LAB
ADV 40+41 DNA STL QL NAA+NON-PROBE: NOT DETECTED
ASTRO TYP 1-8 RNA STL QL NAA+NON-PROBE: NOT DETECTED
BACTERIA BLD CULT: NORMAL
BACTERIA BLD CULT: NORMAL
BASOPHILS # BLD AUTO: 0.04 X10(3)/MCL
BASOPHILS NFR BLD AUTO: 0.4 %
C CAYETANENSIS DNA STL QL NAA+NON-PROBE: NOT DETECTED
C COLI+JEJ+UPSA DNA STL QL NAA+NON-PROBE: NOT DETECTED
CRYPTOSP DNA STL QL NAA+NON-PROBE: NOT DETECTED
E HISTOLYT DNA STL QL NAA+NON-PROBE: NOT DETECTED
EAEC PAA PLAS AGGR+AATA ST NAA+NON-PRB: NOT DETECTED
EC STX1+STX2 GENES STL QL NAA+NON-PROBE: NOT DETECTED
EOSINOPHIL # BLD AUTO: 0.23 X10(3)/MCL (ref 0–0.9)
EOSINOPHIL NFR BLD AUTO: 2.3 %
EPEC EAE GENE STL QL NAA+NON-PROBE: NOT DETECTED
ERYTHROCYTE [DISTWIDTH] IN BLOOD BY AUTOMATED COUNT: 20.7 % (ref 11.5–17)
ETEC LTA+ST1A+ST1B TOX ST NAA+NON-PROBE: NOT DETECTED
G LAMBLIA DNA STL QL NAA+NON-PROBE: NOT DETECTED
HCT VFR BLD AUTO: 24.2 % (ref 42–52)
HGB BLD-MCNC: 7.8 G/DL (ref 14–18)
IMM GRANULOCYTES # BLD AUTO: 0.12 X10(3)/MCL (ref 0–0.04)
IMM GRANULOCYTES NFR BLD AUTO: 1.2 %
LYMPHOCYTES # BLD AUTO: 1.29 X10(3)/MCL (ref 0.6–4.6)
LYMPHOCYTES NFR BLD AUTO: 13 %
MCH RBC QN AUTO: 28.2 PG (ref 27–31)
MCHC RBC AUTO-ENTMCNC: 32.2 G/DL (ref 33–36)
MCV RBC AUTO: 87.4 FL (ref 80–94)
MONOCYTES # BLD AUTO: 0.82 X10(3)/MCL (ref 0.1–1.3)
MONOCYTES NFR BLD AUTO: 8.2 %
NEUTROPHILS # BLD AUTO: 7.46 X10(3)/MCL (ref 2.1–9.2)
NEUTROPHILS NFR BLD AUTO: 74.9 %
NOROVIRUS GI+II RNA STL QL NAA+NON-PROBE: NOT DETECTED
NRBC BLD AUTO-RTO: 0 %
P SHIGELLOIDES DNA STL QL NAA+NON-PROBE: NOT DETECTED
PLATELET # BLD AUTO: 323 X10(3)/MCL (ref 130–400)
PMV BLD AUTO: 9.8 FL (ref 7.4–10.4)
POCT GLUCOSE: 122 MG/DL (ref 70–110)
POCT GLUCOSE: 129 MG/DL (ref 70–110)
RBC # BLD AUTO: 2.77 X10(6)/MCL (ref 4.7–6.1)
RVA RNA STL QL NAA+NON-PROBE: NOT DETECTED
S ENT+BONG DNA STL QL NAA+NON-PROBE: NOT DETECTED
SAPO I+II+IV+V RNA STL QL NAA+NON-PROBE: NOT DETECTED
SHIGELLA SP+EIEC IPAH ST NAA+NON-PROBE: NOT DETECTED
V CHOL+PARA+VUL DNA STL QL NAA+NON-PROBE: NOT DETECTED
V CHOLERAE DNA STL QL NAA+NON-PROBE: NOT DETECTED
WBC # BLD AUTO: 9.96 X10(3)/MCL (ref 4.5–11.5)
Y ENTEROCOL DNA STL QL NAA+NON-PROBE: NOT DETECTED

## 2024-11-13 PROCEDURE — 25000003 PHARM REV CODE 250: Performed by: GENERAL PRACTICE

## 2024-11-13 PROCEDURE — 25000003 PHARM REV CODE 250

## 2024-11-13 PROCEDURE — 27000207 HC ISOLATION

## 2024-11-13 PROCEDURE — 94003 VENT MGMT INPAT SUBQ DAY: CPT

## 2024-11-13 PROCEDURE — 94760 N-INVAS EAR/PLS OXIMETRY 1: CPT

## 2024-11-13 PROCEDURE — 99233 SBSQ HOSP IP/OBS HIGH 50: CPT | Mod: ,,, | Performed by: HOSPITALIST

## 2024-11-13 PROCEDURE — 27200966 HC CLOSED SUCTION SYSTEM

## 2024-11-13 PROCEDURE — 99900035 HC TECH TIME PER 15 MIN (STAT)

## 2024-11-13 PROCEDURE — 63600175 PHARM REV CODE 636 W HCPCS

## 2024-11-13 PROCEDURE — 25000242 PHARM REV CODE 250 ALT 637 W/ HCPCS: Performed by: INTERNAL MEDICINE

## 2024-11-13 PROCEDURE — 25000003 PHARM REV CODE 250: Performed by: HOSPITALIST

## 2024-11-13 PROCEDURE — 99900022

## 2024-11-13 PROCEDURE — 99900026 HC AIRWAY MAINTENANCE (STAT)

## 2024-11-13 PROCEDURE — 99900031 HC PATIENT EDUCATION (STAT)

## 2024-11-13 PROCEDURE — 94640 AIRWAY INHALATION TREATMENT: CPT

## 2024-11-13 PROCEDURE — 25000003 PHARM REV CODE 250: Performed by: NURSE PRACTITIONER

## 2024-11-13 PROCEDURE — 85025 COMPLETE CBC W/AUTO DIFF WBC: CPT | Performed by: INTERNAL MEDICINE

## 2024-11-13 PROCEDURE — 20000000 HC ICU ROOM

## 2024-11-13 PROCEDURE — 87507 IADNA-DNA/RNA PROBE TQ 12-25: CPT | Performed by: HOSPITALIST

## 2024-11-13 PROCEDURE — 27100171 HC OXYGEN HIGH FLOW UP TO 24 HOURS

## 2024-11-13 PROCEDURE — 36415 COLL VENOUS BLD VENIPUNCTURE: CPT | Performed by: INTERNAL MEDICINE

## 2024-11-13 RX ADMIN — CIPROFOLXACIN 750 MG: 250 TABLET ORAL at 09:11

## 2024-11-13 RX ADMIN — Medication 4 ML: at 08:11

## 2024-11-13 RX ADMIN — RIVAROXABAN 20 MG: 10 TABLET, FILM COATED ORAL at 08:11

## 2024-11-13 RX ADMIN — SULFAMETHOXAZOLE AND TRIMETHOPRIM 1 TABLET: 800; 160 TABLET ORAL at 08:11

## 2024-11-13 RX ADMIN — METOPROLOL TARTRATE 25 MG: 25 TABLET, FILM COATED ORAL at 08:11

## 2024-11-13 RX ADMIN — CIPROFOLXACIN 750 MG: 250 TABLET ORAL at 08:11

## 2024-11-13 RX ADMIN — QUETIAPINE FUMARATE 50 MG: 25 TABLET ORAL at 08:11

## 2024-11-13 RX ADMIN — SULFAMETHOXAZOLE AND TRIMETHOPRIM 1 TABLET: 800; 160 TABLET ORAL at 09:11

## 2024-11-13 RX ADMIN — PANTOPRAZOLE SODIUM 40 MG: 40 INJECTION, POWDER, LYOPHILIZED, FOR SOLUTION INTRAVENOUS at 09:11

## 2024-11-13 RX ADMIN — Medication 4 ML: at 02:11

## 2024-11-13 RX ADMIN — QUETIAPINE FUMARATE 50 MG: 25 TABLET ORAL at 09:11

## 2024-11-13 RX ADMIN — Medication 4 ML: at 12:11

## 2024-11-13 RX ADMIN — METOPROLOL TARTRATE 25 MG: 25 TABLET, FILM COATED ORAL at 09:11

## 2024-11-13 RX ADMIN — AMIODARONE HYDROCHLORIDE 200 MG: 200 TABLET ORAL at 09:11

## 2024-11-13 NOTE — PROGRESS NOTES
Pulmonary & Critical Care Medicine   Progress Note      Presenting History/HPI:    Patient is a 70 y/o male with extensive PMH who presented from NH on 10/20/24 with decreased responsiveness, severe sepsis, and recurrent UTI. 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     Patient transferred to ED from [facility] with lethargy and tachycardia. Family at bedside reports patient is nonverbal at baseline but typically more alert. In the ED, patient is febrile, tachycardic with -190s, tachypneic, /60. EKG shows Afib with RVR. Diltiazem drip started in ED. Labs are significant for WBC of 16.5, lactic acid of 3.3, Cr 1.48, BUN 45.3, Na 155, K+ 5.1, troponin elevated at 0.118. UA with evidence of UTI. Of note, pt was being treated outpatient for a UTI, currently on day 4 of 7 day Rocephin course. Urine culture 10/16/24 with multidrug resistant Klebsiella pneumonia and Proteus mirabilis with susceptibility to Cefepime. Patient received 3L of NS and initiated on Vanc and Cefepime in ED. Admitted to the ICU on mechanical ventilation via trach.    Admitted to the ICU on 10/20   Peg tube extension to J-tube on 10/30    Interval History:  NAEON. Patient is afebrile overnight, VSS. Hemoglobin has been decreasing steadily to 7.8/24.2. blood cultures from 11/12 still pending. Family still wants aggressive treatment, will hold on blood transfusion.    Scheduled Medications:    amiodarone  200 mg Per G Tube Daily    ciprofloxacin HCl  750 mg Oral Q12H    metoprolol tartrate  25 mg Per G Tube BID    pantoprazole  40 mg Intravenous Daily    QUEtiapine  50 mg Per G Tube BID    rivaroxaban  20 mg Per G Tube Nightly     sodium chloride 3%  4 mL Nebulization Q8H    sulfamethoxazole-trimethoprim 800-160mg  1 tablet Oral BID       PRN Medications:     Current Facility-Administered Medications:     0.9%  NaCl infusion (for blood administration), , Intravenous, Q24H PRN    acetaminophen, 650 mg, Per G Tube, Q6H PRN    dextromethorphan-guaiFENesin  mg/5 ml, 10 mL, Per G Tube, Q4H PRN    dextrose 10%, 12.5 g, Intravenous, PRN    dextrose 10%, 25 g, Intravenous, PRN    diatrizoate meglumineand-diatrizoate sodium, 30 mL, Oral, ONCE PRN    glucagon (human recombinant), 1 mg, Intramuscular, PRN    hydrALAZINE, 10 mg, Intravenous, Q4H PRN    metoclopramide HCl, 10 mg, Oral, Q8H PRN    naloxone, 0.02 mg, Intravenous, PRN    sodium chloride 0.9%, 10 mL, Intravenous, Q12H PRN      Infusions:          Fluid Balance:     Intake/Output Summary (Last 24 hours) at 11/13/2024 0634  Last data filed at 11/13/2024 0440  Gross per 24 hour   Intake 2207 ml   Output 2340 ml   Net -133 ml         Vital Signs:   Vitals:    11/13/24 0600   BP:    Pulse: 105   Resp: (!) 26   Temp:          Physical Exam  Vitals and nursing note reviewed.   Constitutional:       Appearance: He is ill-appearing.   HENT:      Head: Normocephalic.      Right Ear: External ear normal.      Left Ear: External ear normal.      Nose: Nose normal.      Mouth/Throat:      Mouth: Mucous membranes are moist.      Pharynx: Oropharynx is clear. No oropharyngeal exudate or posterior oropharyngeal erythema.   Eyes:      General: No scleral icterus.     Extraocular Movements: Extraocular movements intact.      Conjunctiva/sclera: Conjunctivae normal.      Pupils: Pupils are equal, round, and reactive to light.   Neck:      Comments: Tracheostomy in place, site clean and dry  Cardiovascular:      Rate and Rhythm: Normal rate and regular rhythm.      Pulses: Normal pulses.      Heart sounds: Normal heart sounds. No murmur heard.     No friction rub. No gallop.   Pulmonary:      Effort:  "Pulmonary effort is normal. No respiratory distress.      Breath sounds: Normal breath sounds. No stridor. No wheezing, rhonchi or rales.      Comments: On mechanical ventilation via tracheostomy tube, no dyssynchrony seen on mechanical ventilation, no accessory muscle use   Chest:      Chest wall: No tenderness.   Abdominal:      General: Abdomen is flat. Bowel sounds are normal. There is no distension.      Palpations: Abdomen is soft.      Tenderness: There is no abdominal tenderness. There is no rebound.      Comments: J-tube in place site clean and dry   Musculoskeletal:      Cervical back: Normal range of motion. No rigidity or tenderness.   Skin:     General: Skin is warm and dry.      Capillary Refill: Capillary refill takes less than 2 seconds.      Coloration: Skin is not jaundiced.      Findings: No bruising, erythema or rash.   Neurological:      Mental Status: Mental status is at baseline.      Comments: Nonverbal, opens eyes to loud name call, not moving any extremities randomly or on command   Psychiatric:      Comments: Unable to fully assess       Ventilator Settings  Vent Mode: A/C (11/13/24 0450)  Ventilator Initiated: Yes (10/20/24 1546)  Set Rate: 20 BPM (11/13/24 0450)  Vt Set: 500 mL (11/13/24 0450)  PEEP/CPAP: 8 cmH20 (11/13/24 0450)  Oxygen Concentration (%): 35 (11/13/24 0450)  Peak Airway Pressure: 15 cmH20 (11/13/24 0450)  Plateau Pressure: 3 cmH20 (11/01/24 0338)  Total Ve: 7.3 L/m (11/13/24 0450)  F/VT Ratio<105 (RSBI): (!) 81.87 (11/13/24 0450)      Laboratory Studies:   No results for input(s): "PH", "PCO2", "PO2", "HCO3", "POCSATURATED", "BE" in the last 24 hours.  Recent Labs   Lab 11/13/24 0310   WBC 9.96   RBC 2.77*   HGB 7.8*   HCT 24.2*      MCV 87.4   MCH 28.2   MCHC 32.2*         No results for input(s): "GLUCOSE", "NA", "K", "CL", "CO2", "BUN", "CREATININE", "CALCIUM", "MG" in the last 24 hours.          Microbiology Data:   Microbiology Results (last 7 days)       " Procedure Component Value Units Date/Time    Blood Culture [[id number]]  (Normal) Collected: 11/08/24 0839    Order Status: Completed Specimen: Blood Updated: 11/12/24 1101     Blood Culture No Growth At 96 Hours    Blood Culture [[id number]]  (Normal) Collected: 11/08/24 0929    Order Status: Completed Specimen: Blood Updated: 11/12/24 1101     Blood Culture No Growth At 96 Hours    Blood Culture [[id number]] Collected: 11/12/24 1017    Order Status: Resulted Specimen: Blood from Hand, Left Updated: 11/12/24 1036    Blood Culture [[id number]] Collected: 11/12/24 1017    Order Status: Resulted Specimen: Blood from Hand, Right Updated: 11/12/24 1036    Respiratory Culture [[id number]]  (Abnormal)  (Susceptibility) Collected: 11/01/24 1619    Order Status: Completed Specimen: Respiratory from Sputum, Expectorated Updated: 11/11/24 0618     Respiratory Culture Many ACINETOBACTER BAUMANNII      Many Pseudomonas aeruginosa     GRAM STAIN Quality 2+      Many Gram Negative Rods      Few Yeast              Imaging:   CT Chest Abdomen Pelvis With IV Contrast (XPD) Routine Oral Contrast  Narrative: EXAMINATION:  CT CHEST ABDOMEN PELVIS WITH IV CONTRAST (XPD)    CLINICAL HISTORY:  fever;    TECHNIQUE:  Low dose axial images, sagittal and coronal reformations were obtained from the thoracic inlet to the pubic symphysis following the IV and oral contrast administration.  Automatic exposure control is utilized to reduce patient radiation exposure.    COMPARISON:  10/21/2024 and 08/19/2020    FINDINGS:  There is bilateral upper and lower lobe pneumonia with patchy interstitial and ground-glass infiltrates seen in both lungs.  There are bilateral pleural effusions.  Findings are worse on the right..    The thoracic aorta is normal in caliber..    Some reactive subcentimeter lymphadenopathy seen the mediastinum.    The heart is normal in size..    ..    The liver appears normal.  No liver mass or lesion is seen.  Portal and hepatic  veins appear normal..    The gallbladder appears normal.  No gallstones are seen.    The spleen appears normal.  No splenic mass or lesion is seen.    The pancreas appears grossly unremarkable.  No pancreatic mass or lesion is seen.  No inflammation is seen.    No adrenal abnormality is seen.  No adrenal nodule is seen.    The kidneys are well perfused.  There is a cyst in the lower pole the right kidney.  No hydronephrosis is seen.  No hydroureter is seen.  No retroperitoneal abnormality is seen..    Visualized portions of the bowel shows no acute abnormality.  No colitis is seen.  No diverticulitis is seen.  No colonic mass is seen.    No free air is seen.  No free fluid is seen.    There is persistent urinary bladder wall thickening seen.  Bladder is also decompressed.    There is a sacral decubitus seen which was present on the prior examination as well and appears similar.    There are chronic dysplastic changes seen in the left hip with severe heterotrophic bone formation seen and changes consistent with previous trauma.  This is unchanged since the prior examination.    .  Impression: Diffuse bilateral upper and lower lobe pneumonia and bilateral pleural effusions.  Findings have progressed since the prior examination    Sacral decubitus relatively stable since prior examination    Persistent urinary bladder wall thickening possibly due to decompression versus cystitis    Electronically signed by: Erick Grant  Date:    11/12/2024  Time:    15:49          Assessment and Plan    Assessment:  Sepsis without shock with underlying VRE Enterococcus and ESBL Klebsiella bacteremia with Klebsiella and Proteus pneumonia on 10/20/10/24 (resolved)  -Acinetobacter and Pseudomonas positive sputum culture on 11/01/2024 with Acinetobacter sensitive to gentamicin and tobramycin and Pseudomonas sensitive to ciprofloxacin gentamicin and tobramycin, suspect colonization without leukocytosis or increased sputum production or  worsening hypoxia  Repeat pneumonia without sepsis and/or septic shock (11/08/2024)  -CXR on personal read showed new left sided infiltrate.   -blood cultures NTD  Chronic hypoxemic respiratory failure with tracheostomy on permanent mechanical ventilatory support secondary to prior cerebrovascular accident with subsequent hemorrhagic stroke status post craniectomy   AFib with RVR (rate controlled)  Chronic sacral decubitus ulcer present on admission  -PEG tube feed intolerance status post J-tube extension with tolerance of tube feeds  Altered mental status due to the above intracranial process    Plan:  -titrate mechanical ventilation for ARDS net protocol   -supplement oxygen to maintain saturation 94-96%   -routine tracheostomy care   -tube feeds to goal with free water flushes as appropriate   -continues on digoxin, Lopressor, and amiodarone; remains in afib, nothing further to do for Afib  -continue IV cipro 500 mg bid, ds bactrim bid, metronidazole 500 mg tid, and inhaled tobramycin per ID recs  -wound care for sacral decubitus ulcer debrided at bedside on 11/02 with wound VAC placement on 11/04, appreciate assistance from Wound Care and from General surgery  -palliative care consulted for goals of care discussion with family as overall prognosis remains poor; family desire to remain full code and continue all aggressive treatment at this time    DVT ppx/tx with Xarelto  GI ppx with protonix  Keep HOB elevated > 30*      Eugene Patel MD  11/13/2024  Pulmonology/Critical Care

## 2024-11-13 NOTE — PROGRESS NOTES
Infectious Disease  Progress Note    Patient Name: Delio Daniel Jr.   MRN: [id number]   Admission Date: 10/20/2024   Hospital Length of Stay: 24 days  Attending Physician: Jamil Hutchins Jr., MD,*   Primary Care Provider: Jl Briones MD     Isolation Status: Contact         Interval:   10/25 - afebrile. GOC ongoing. Blood is clear for 72 hrs, continue current regimen for #14 days, ID will sign off. If any changes in plan please call back   11/08:  Patient completed course of meropenem and linezolid on 11/05, started having worsening leukocytosis and had an episode of fever over the past 2 days.  ID reconsulted  Patient has multiple potential sources for infection, most concerning are Pulmonary, sacral ulcer, respiratory cultures noted.  He continues to be on minimal O2 requirements on the vent and does not appear to be in respiratory distress.  He does have a chest x-ray with concern of new infiltrates  0/25 - afebrile. GOC ongoing. Blood is clear for 72 hrs, continue current regimen for #14 days, ID will sign off. If any changes in plan please call back   11/08:  Patient completed course of meropenem and linezolid on 11/05, started having worsening leukocytosis and had an episode of fever over the past 2 days.  ID reconsulted  Patient has multiple potential sources for infection, most concerning are Pulmonary, sacral ulcer, respiratory cultures noted.  He continues to be on minimal O2 requirements on the vent and does not appear to be in respiratory distress.  He does have a chest x-ray with concern of new infiltrates  11/11- afebrile.  BCx in process.  Resp cx 11/01 with Acineto + Pseudomonas in wound, add tmp/smx + cipro/metro. Pan CT pending. Family meeting today  11/12 - febrile 101.4F. continue aggressive care. Obtain blood cx and pan CT.   11/13 - febrile 100.4. CT shows progressive pneumonia. Currently on targeted therapy, no changes in vent settings/requirements. Stool is more liquid today than  yesterday with increased output, send GI panel  Assessment/Plan:   1) Bacteremia  - polymicrobial in setting of cardiac device  - BCx 10/20 - VRE and K. Pneumoniae  - BCx 10/22 - ngtd  - adequately treated     2) Pneumonia  - Resp Cx 11/01 - Acinetobacter baumanii (S-gent, brody, tmp/smx) + Pseudomonas aeruginosa (S-avycaz, zerbaxa, cipro, gent, brody)  - CT 11/12- Diffuse bilateral upper and lower lobe pneumonia and bilateral pleural effusions.  Findings have progressed since the prior examination.Sacral decubitus relatively stable since prior examination   Persistent urinary bladder wall thickening possibly due to decompression versus cystitis  - currently on cipro/metro  - currently on tmp/smx  - BCx 11/12 - in process  - poor baseline function, remains at risk for infections with MDRO with  poor long term prognosis, family wishes to continue with agressive care      3) Decubitus ulcer  - bed bound  - offloading, nutrition  - wound is clean, no superinfection or tunneling to bone on 11/11 exam    Discussed and seen with RN    Thank you for your consult. ID will continue following. Please contact us with any questions or concerns.      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 On admission, noted to be hypotensive with Afib and RVR, recently had positive urine culture with Klebsiella and Proteus. He also has an advanced sacral ulcer. He was noted to have bacteremia with 2/4 of admission blood cultures being positive for Enterococcus faecium and ultimately noting it is VRE per BCID.  ID consulted for assistance in management.       Subjective:     Principal Problem: UTI (urinary tract infection)     Interval History:   No changes to overall clinical condition, remains obtunded and unable to participate in evaluation    Review of Systems   Review of Systems   Unable to perform ROS: Medical condition   Gastrointestinal:  Positive for diarrhea.        Objective:     Vital Signs (Most Recent):  Temp: 99.8 °F (37.7 °C) (11/13/24 0400)  Pulse: (!) 112 (11/13/24 0825)  Resp: (!) 30 (11/13/24 0825)  BP: 119/74 (11/13/24 0500)  SpO2: 96 % (11/13/24 0825)  Vital Signs (24h Range):  Temp:  [98.8 °F (37.1 °C)-99.8 °F (37.7 °C)] 99.8 °F (37.7 °C)  Pulse:  [] 112  Resp:  [16-32] 30  SpO2:  [76 %-100 %] 96 %  BP: ()/(51-77) 119/74      Weight:   Wt Readings from Last 1 Encounters:   10/21/24 69.1 kg (152 lb 5.4 oz)      Body mass index is Body mass index is 22.49 kg/m².     Estimated Creatinine Clearance: Estimated Creatinine Clearance: 135.5 mL/min (A) (based on SCr of 0.51 mg/dL (L)).     Lines/Drains/Airways       Drain  Duration                  Rectal Tube 10/25/24 2030 18 days         Gastrostomy/Enterostomy 10/30/24 1200 Gastrostomy-jejunostomy feeding 13 days    Male External Urinary Catheter 11/08/24 1540 4 days              Airway  Duration             Adult Surgical Airway 08/19/24 0120 Shiley Extra Large Cuffed Distal 6.0/ 75mm 86 days              Peripheral Intravenous Line  Duration                  Midline Catheter - Single Lumen 10/20/24 1530 Right 23 days                     Physical Exam  Physical Exam  Constitutional:       Appearance: He is not ill-appearing (Chronically ill-appearing).      Comments: Minimally interacting   HENT:      Head: Normocephalic and atraumatic.      Mouth/Throat:      Pharynx: No oropharyngeal exudate or posterior oropharyngeal erythema.   Eyes:      Extraocular Movements: Extraocular movements intact.      Pupils: Pupils are equal, round, and  "reactive to light.   Cardiovascular:      Rate and Rhythm: Normal rate and regular rhythm.      Heart sounds: No murmur heard.  Pulmonary:      Effort: No respiratory distress.      Breath sounds: No wheezing, rhonchi or rales.      Comments: Tracheostomy in place, ON VENT  Abdominal:      General: Bowel sounds are normal. There is no distension.      Palpations: Abdomen is soft.      Tenderness: There is no abdominal tenderness.      Comments: Peg tube in place   Genitourinary:     Comments: purewick  Rectal tube  Musculoskeletal:         General: No swelling or tenderness.      Cervical back: Neck supple. No rigidity or tenderness.      Comments: Deep sacral ulcer ongoing   Lymphadenopathy:      Cervical: No cervical adenopathy.   Skin:     Findings: No lesion or rash.   Neurological:      Mental Status: He is alert. Mental status is at baseline.      Comments: At baseline, nonverbal, interaction is minimal but alert and responsive          Significant Labs: CBC:   Recent Labs   Lab 11/13/24  0310   WBC 9.96   HGB 7.8*   HCT 24.2*        CMP:   No results for input(s): "NA", "K", "CL", "CO2", "GLU", "BUN", "CREATININE", "CALCIUM", "PROT", "ALBUMIN", "BILITOT", "ALKPHOS", "AST", "ALT", "ANIONGAP", "EGFRNONAA" in the last 48 hours.    Invalid input(s): "ESTGFAFRICA"      Microbiology Results (last 7 days)       Procedure Component Value Units Date/Time    Blood Culture [[id number]]  (Normal) Collected: 11/08/24 0839    Order Status: Completed Specimen: Blood Updated: 11/12/24 1101     Blood Culture No Growth At 96 Hours    Blood Culture [[id number]]  (Normal) Collected: 11/08/24 0929    Order Status: Completed Specimen: Blood Updated: 11/12/24 1101     Blood Culture No Growth At 96 Hours    Blood Culture [[id number]] Collected: 11/12/24 1017    Order Status: Resulted Specimen: Blood from Hand, Left Updated: 11/12/24 1036    Blood Culture [[id number]] Collected: 11/12/24 1017    Order Status: Resulted " Specimen: Blood from Hand, Right Updated: 11/12/24 1036    Respiratory Culture [[id number]]  (Abnormal)  (Susceptibility) Collected: 11/01/24 1619    Order Status: Completed Specimen: Respiratory from Sputum, Expectorated Updated: 11/11/24 0618     Respiratory Culture Many ACINETOBACTER BAUMANNII      Many Pseudomonas aeruginosa     GRAM STAIN Quality 2+      Many Gram Negative Rods      Few Yeast             Significant Imaging: I have reviewed all pertinent imaging results/findings within the past 24 hours.      Kenneth Bhardwaj MD  Infectious Disease  [facility]

## 2024-11-13 NOTE — PLAN OF CARE
Anika with [facility] said they will need one day notice for discharge if patient will need wound vac at NH. This cannot be coordinated over the weekend.

## 2024-11-14 PROBLEM — L89.154 PRESSURE ULCER OF SACRAL REGION, STAGE 4: Status: ACTIVE | Noted: 2024-11-14

## 2024-11-14 LAB
POCT GLUCOSE: 123 MG/DL (ref 70–110)
POCT GLUCOSE: 123 MG/DL (ref 70–110)
POCT GLUCOSE: 134 MG/DL (ref 70–110)
POCT GLUCOSE: 89 MG/DL (ref 70–110)

## 2024-11-14 PROCEDURE — 94003 VENT MGMT INPAT SUBQ DAY: CPT

## 2024-11-14 PROCEDURE — 27000207 HC ISOLATION

## 2024-11-14 PROCEDURE — 25000003 PHARM REV CODE 250: Performed by: HOSPITALIST

## 2024-11-14 PROCEDURE — 97606 NEG PRS WND THER DME>50 SQCM: CPT

## 2024-11-14 PROCEDURE — 99233 SBSQ HOSP IP/OBS HIGH 50: CPT | Mod: ,,, | Performed by: HOSPITALIST

## 2024-11-14 PROCEDURE — 94761 N-INVAS EAR/PLS OXIMETRY MLT: CPT

## 2024-11-14 PROCEDURE — 99900026 HC AIRWAY MAINTENANCE (STAT)

## 2024-11-14 PROCEDURE — 94668 MNPJ CHEST WALL SBSQ: CPT

## 2024-11-14 PROCEDURE — 25000003 PHARM REV CODE 250

## 2024-11-14 PROCEDURE — 20000000 HC ICU ROOM

## 2024-11-14 PROCEDURE — 25000003 PHARM REV CODE 250: Performed by: NURSE PRACTITIONER

## 2024-11-14 PROCEDURE — 94760 N-INVAS EAR/PLS OXIMETRY 1: CPT

## 2024-11-14 PROCEDURE — 99233 SBSQ HOSP IP/OBS HIGH 50: CPT | Mod: ,,,

## 2024-11-14 PROCEDURE — 63600175 PHARM REV CODE 636 W HCPCS

## 2024-11-14 PROCEDURE — 25000242 PHARM REV CODE 250 ALT 637 W/ HCPCS: Performed by: INTERNAL MEDICINE

## 2024-11-14 PROCEDURE — 99900035 HC TECH TIME PER 15 MIN (STAT)

## 2024-11-14 PROCEDURE — 94640 AIRWAY INHALATION TREATMENT: CPT

## 2024-11-14 PROCEDURE — 27100171 HC OXYGEN HIGH FLOW UP TO 24 HOURS

## 2024-11-14 PROCEDURE — 36415 COLL VENOUS BLD VENIPUNCTURE: CPT

## 2024-11-14 PROCEDURE — 25000003 PHARM REV CODE 250: Performed by: GENERAL PRACTICE

## 2024-11-14 PROCEDURE — 84134 ASSAY OF PREALBUMIN: CPT

## 2024-11-14 PROCEDURE — 99900031 HC PATIENT EDUCATION (STAT)

## 2024-11-14 PROCEDURE — 63600175 PHARM REV CODE 636 W HCPCS: Mod: JZ,JG | Performed by: INTERNAL MEDICINE

## 2024-11-14 RX ORDER — METRONIDAZOLE 500 MG/100ML
500 INJECTION, SOLUTION INTRAVENOUS
Status: DISCONTINUED | OUTPATIENT
Start: 2024-11-14 | End: 2024-11-16

## 2024-11-14 RX ADMIN — Medication 4 ML: at 08:11

## 2024-11-14 RX ADMIN — METRONIDAZOLE 500 MG: 500 INJECTION, SOLUTION INTRAVENOUS at 10:11

## 2024-11-14 RX ADMIN — CIPROFOLXACIN 750 MG: 250 TABLET ORAL at 10:11

## 2024-11-14 RX ADMIN — CIPROFOLXACIN 750 MG: 250 TABLET ORAL at 08:11

## 2024-11-14 RX ADMIN — SULFAMETHOXAZOLE AND TRIMETHOPRIM 1 TABLET: 800; 160 TABLET ORAL at 09:11

## 2024-11-14 RX ADMIN — Medication 4 ML: at 04:11

## 2024-11-14 RX ADMIN — SULFAMETHOXAZOLE AND TRIMETHOPRIM 1 TABLET: 800; 160 TABLET ORAL at 08:11

## 2024-11-14 RX ADMIN — METOPROLOL TARTRATE 25 MG: 25 TABLET, FILM COATED ORAL at 08:11

## 2024-11-14 RX ADMIN — Medication 4 ML: at 12:11

## 2024-11-14 RX ADMIN — QUETIAPINE FUMARATE 50 MG: 25 TABLET ORAL at 08:11

## 2024-11-14 RX ADMIN — RIVAROXABAN 20 MG: 10 TABLET, FILM COATED ORAL at 08:11

## 2024-11-14 RX ADMIN — AMIODARONE HYDROCHLORIDE 200 MG: 200 TABLET ORAL at 09:11

## 2024-11-14 RX ADMIN — QUETIAPINE FUMARATE 50 MG: 25 TABLET ORAL at 09:11

## 2024-11-14 RX ADMIN — METOPROLOL TARTRATE 25 MG: 25 TABLET, FILM COATED ORAL at 09:11

## 2024-11-14 RX ADMIN — PANTOPRAZOLE SODIUM 40 MG: 40 INJECTION, POWDER, LYOPHILIZED, FOR SOLUTION INTRAVENOUS at 10:11

## 2024-11-14 NOTE — SUBJECTIVE & OBJECTIVE
Subjective:     HPI:  Wound medicine re-check    The patient is a 69 year old male who presented to [facility] ED on 10/20/24 from [facility] with decreased responsiveness, sepsis, and recurrent UTI. Noted to be hypotensive with Afib and RVR, also requiring mechanical ventilation and admitted to the ICU.   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 Patient is non-verbal at baseline, contracted upper and lower extremities on left side. 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   Blood cultures on admission + Enterococcus faecium - VRE per BCID. Urine culture with Klebsiella and Proteus. ID guiding antibiotic stewardship.     Patient admitted with unstageable pressure ulcer of sacrum; seen by inpatient wound nurse and treatment was iniatated, wound medicine consulted for evaluation and possible debridement.   No family present at time of assessment, all information gained through chart review. In June 2024 appears that patient was seen by wound  for sacral pressure ulcer, no visit notes or images availabe from this time frame. First image of sacral region in May 2024 with wound of sacrum/coccyx. In July 2024 images appears that wound had healed with remaining dark discoloration of sacrum/buttocks and then again noted with wound in images of late August 2024; appears to have evolved and worsened up to this point.  Initial evaluation of wounds for this encounter done on 10/22/24 - several pressure ulcers with most concerning is the sacral/coccyx unstageable ulcer. Also with fecal incontinence which is contaminating the wound as it is veryc close to the anus. He is contracted and we were unable to get full visualization and positioning for bedside debridement. Recommendations made for palliative  care consultation  to discuss overall goals of care.   Palliative care consulted and discussion held with family in which they have decided to continue with treatment.Palliative met with family again on 11/11 with wishes to continue with aggressive supportive measures with full code status.   Bedside debridement of sacral ulcer with Surgery on 11/2/24.   Wound Vac applied on 11/4/24  ID continues to follow,  guiding antibiotic stewardship. BC from 11/8 normal; BC from 11/12 preliminary no growth at 48 hours.   11/14/24 - wound re-check today. Met patient in room IN02, he is awake, non-verbal with trach on mechanical ventilation. He is on ICU low air loss bed with bilateral heel boots in place.  Accompanied by ICU nurse as well as inpatient wound nurse. Difficult to reposition because of contractures. Febrile, elevated temps since 11/12; CT chest/abd/pelv on 11/12/24 Impression: diffuse bilateral upper and lower lobe pneumonia and bilateral pleural effusions. Findings have progressed since the prior examination. No acute distress. Tolerates repositioning for wound care.              Hospital Course:   No notes on file      Follow-up For: Procedure(s) (LRB):  EGD w/ Jtube placement (N/A)    Post-Operative Day: 15 Days Post-Op    Scheduled Meds:   amiodarone  200 mg Per G Tube Daily    ciprofloxacin HCl  750 mg Oral Q12H    metoprolol tartrate  25 mg Per G Tube BID    pantoprazole  40 mg Intravenous Daily    QUEtiapine  50 mg Per G Tube BID    rivaroxaban  20 mg Per G Tube Nightly    sodium chloride 3%  4 mL Nebulization Q8H    sulfamethoxazole-trimethoprim 800-160mg  1 tablet Oral BID     Continuous Infusions:  PRN Meds:  Current Facility-Administered Medications:     0.9%  NaCl infusion (for blood administration), , Intravenous, Q24H PRN    acetaminophen, 650 mg, Per G Tube, Q6H PRN    dextromethorphan-guaiFENesin  mg/5 ml, 10 mL, Per G Tube, Q4H PRN    dextrose 10%, 12.5 g, Intravenous, PRN    dextrose 10%, 25  g, Intravenous, PRN    diatrizoate meglumineand-diatrizoate sodium, 30 mL, Oral, ONCE PRN    glucagon (human recombinant), 1 mg, Intramuscular, PRN    hydrALAZINE, 10 mg, Intravenous, Q4H PRN    metoclopramide HCl, 10 mg, Oral, Q8H PRN    naloxone, 0.02 mg, Intravenous, PRN    sodium chloride 0.9%, 10 mL, Intravenous, Q12H PRN    Review of Systems   Unable to perform ROS: Patient nonverbal     Objective:     Vital Signs (Most Recent):  Temp: 100 °F (37.8 °C) (11/14/24 0800)  Pulse: 90 (11/14/24 1015)  Resp: (!) 23 (11/14/24 1015)  BP: 117/67 (11/14/24 1015)  SpO2: 95 % (11/14/24 1015) Vital Signs (24h Range):  Temp:  [98.9 °F (37.2 °C)-100 °F (37.8 °C)] 100 °F (37.8 °C)  Pulse:  [] 90  Resp:  [20-36] 23  SpO2:  [86 %-100 %] 95 %  BP: ()/(49-90) 117/67     Weight: 69.1 kg (152 lb 5.4 oz)  Body mass index is 22.49 kg/m².     Physical Exam  Vitals reviewed.   Constitutional:       General: He is awake.      Comments: Rectal tube with watery green/yellow stool; strong odor   Some leakage around tube, wound vac in place, did not contaminate wound       HENT:      Head:      Comments: Right bone flap      Neck:      Comments: Tracheostomy   Pulmonary:      Comments: Mechanical ventilation    Abdominal:      Comments: PEG/J-Tube   Skin:     General: Skin is warm and dry.      Capillary Refill: Capillary refill takes less than 2 seconds.      Findings: Wound present.             Comments:     Left 1st and 2nd toe abrasions - JAYME   Neurological:      Comments: Left upper and lower extremity contracted  Moves RUE       Sacrum: 7 x 8 x 2 cm    Undermining from 1 - 5 cm and tunneling at 3 o'clock       Left lateral knee: 0.8 x 1 x 0.1 cm       Left lateral ankle; 0.4 x 0.4 cm                  Laboratory:  A1C:   Recent Labs   Lab 08/26/24  1221   HGBA1C 5.3     BMP:   Recent Labs   Lab 11/11/24  0352      K 4.1      CO2 32*   BUN 19.8   CREATININE 0.51*   CALCIUM 8.4*       CBC:   Recent Labs   Lab  11/13/24  0310   WBC 9.96   RBC 2.77*   HGB 7.8*   HCT 24.2*      MCV 87.4   MCH 28.2   MCHC 32.2*     CMP:   Recent Labs   Lab 11/11/24 0352   CALCIUM 8.4*   ALBUMIN 1.7*      K 4.1   CO2 32*      BUN 19.8   CREATININE 0.51*   ALKPHOS 73   ALT 15   AST 18   BILITOT 0.5     LFTs:   Recent Labs   Lab 11/11/24 0352   ALT 15   AST 18   ALKPHOS 73   BILITOT 0.5   ALBUMIN 1.7*       Microbiology Results (last 7 days)       Procedure Component Value Units Date/Time    Blood Culture [[id number]]  (Normal) Collected: 11/12/24 1017    Order Status: Completed Specimen: Blood from Hand, Left Updated: 11/14/24 1101     Blood Culture No Growth At 48 Hours    Blood Culture [[id number]]  (Normal) Collected: 11/12/24 1017    Order Status: Completed Specimen: Blood from Hand, Right Updated: 11/14/24 1101     Blood Culture No Growth At 48 Hours    Blood Culture [[id number]]  (Normal) Collected: 11/08/24 0839    Order Status: Completed Specimen: Blood Updated: 11/13/24 1101     Blood Culture No Growth at 5 days    Blood Culture [[id number]]  (Normal) Collected: 11/08/24 0929    Order Status: Completed Specimen: Blood Updated: 11/13/24 1101     Blood Culture No Growth at 5 days    Respiratory Culture [[id number]]  (Abnormal)  (Susceptibility) Collected: 11/01/24 1619    Order Status: Completed Specimen: Respiratory from Sputum, Expectorated Updated: 11/11/24 0618     Respiratory Culture Many ACINETOBACTER BAUMANNII      Many Pseudomonas aeruginosa     GRAM STAIN Quality 2+      Many Gram Negative Rods      Few Yeast              Diagnostic Results:  I have reviewed all pertinent imaging results/findings within the past 24 hours.      CT Chest Abdomen Pelvis With IV Contrast (XPD) Routine Oral Contrast  Status: Final result     MyChart Results Release    MyChart Status: Active  Results Release     PACS Images for [facility] [facility] Viewer     Show images for CT Chest Abdomen Pelvis With IV Contrast (XPD) Routine Oral  Contrast  CT Chest Abdomen Pelvis With IV Contrast (XPD) Routine Oral Contrast  Order: [id number]  Status: Final result       Visible to patient: Yes (not seen)       Next appt: 08/21/2025 at 07:15 AM in Radiology ([facility]-CT2 500 LB LIMIT)    0 Result Notes  Details    Reading Physician Reading Date Result Priority   Nellie Grant MD  [phone] 11/12/2024 Routine     Narrative & Impression  EXAMINATION:  CT CHEST ABDOMEN PELVIS WITH IV CONTRAST (XPD)     CLINICAL HISTORY:  fever;     TECHNIQUE:  Low dose axial images, sagittal and coronal reformations were obtained from the thoracic inlet to the pubic symphysis following the IV and oral contrast administration.  Automatic exposure control is utilized to reduce patient radiation exposure.     COMPARISON:  10/21/2024 and 08/19/2020     FINDINGS:  There is bilateral upper and lower lobe pneumonia with patchy interstitial and ground-glass infiltrates seen in both lungs.  There are bilateral pleural effusions.  Findings are worse on the right..     The thoracic aorta is normal in caliber..     Some reactive subcentimeter lymphadenopathy seen the mediastinum.     The heart is normal in size..     ..     The liver appears normal.  No liver mass or lesion is seen.  Portal and hepatic veins appear normal..     The gallbladder appears normal.  No gallstones are seen.     The spleen appears normal.  No splenic mass or lesion is seen.     The pancreas appears grossly unremarkable.  No pancreatic mass or lesion is seen.  No inflammation is seen.     No adrenal abnormality is seen.  No adrenal nodule is seen.     The kidneys are well perfused.  There is a cyst in the lower pole the right kidney.  No hydronephrosis is seen.  No hydroureter is seen.  No retroperitoneal abnormality is seen..     Visualized portions of the bowel shows no acute abnormality.  No colitis is seen.  No diverticulitis is seen.  No colonic mass is seen.     No free air is seen.  No free fluid  is seen.     There is persistent urinary bladder wall thickening seen.  Bladder is also decompressed.     There is a sacral decubitus seen which was present on the prior examination as well and appears similar.     There are chronic dysplastic changes seen in the left hip with severe heterotrophic bone formation seen and changes consistent with previous trauma.  This is unchanged since the prior examination.     .     Impression:     Diffuse bilateral upper and lower lobe pneumonia and bilateral pleural effusions.  Findings have progressed since the prior examination     Sacral decubitus relatively stable since prior examination     Persistent urinary bladder wall thickening possibly due to decompression versus cystitis        Electronically signed by:Erick Grant  Date:                                            11/12/2024  Time:                                           15:49        Exam Ended: 11/12/24 15:18 CST

## 2024-11-14 NOTE — PROGRESS NOTES
Pulmonary & Critical Care Medicine   Progress Note      Presenting History/HPI:    Patient is a 70 y/o male with extensive PMH who presented from [facility] on 10/20/24 with decreased responsiveness, severe sepsis, and recurrent UTI. 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     Patient transferred to ED from [facility] with lethargy and tachycardia. Family at bedside reports patient is nonverbal at baseline but typically more alert. In the ED, patient is febrile, tachycardic with -190s, tachypneic, /60. EKG shows Afib with RVR. Diltiazem drip started in ED. Labs are significant for WBC of 16.5, lactic acid of 3.3, Cr 1.48, BUN 45.3, Na 155, K+ 5.1, troponin elevated at 0.118. UA with evidence of UTI. Of note, pt was being treated outpatient for a UTI, currently on day 4 of 7 day Rocephin course. Urine culture 10/16/24 with multidrug resistant Klebsiella pneumonia and Proteus mirabilis with susceptibility to Cefepime. Patient received 3L of NS and initiated on Vanc and Cefepime in ED. Admitted to the ICU on mechanical ventilation via trach.    Admitted to the ICU on 10/20   Peg tube extension to J-tube on 10/30    Interval History:  NAEON. Patient is afebrile overnight, VSS. Hemoglobin has been decreasing steadily,. Blood cultures from 11/12 NGTD. Family still wants aggressive treatment, will hold on blood transfusion.    Scheduled Medications:    amiodarone  200 mg Per G Tube Daily    ciprofloxacin HCl  750 mg Oral Q12H    metoprolol tartrate  25 mg Per G Tube BID    pantoprazole  40 mg Intravenous Daily    QUEtiapine  50 mg Per G Tube BID    rivaroxaban  20 mg Per G Tube Nightly    sodium chloride 3%   4 mL Nebulization Q8H    sulfamethoxazole-trimethoprim 800-160mg  1 tablet Oral BID       PRN Medications:     Current Facility-Administered Medications:     0.9%  NaCl infusion (for blood administration), , Intravenous, Q24H PRN    acetaminophen, 650 mg, Per G Tube, Q6H PRN    dextromethorphan-guaiFENesin  mg/5 ml, 10 mL, Per G Tube, Q4H PRN    dextrose 10%, 12.5 g, Intravenous, PRN    dextrose 10%, 25 g, Intravenous, PRN    diatrizoate meglumineand-diatrizoate sodium, 30 mL, Oral, ONCE PRN    glucagon (human recombinant), 1 mg, Intramuscular, PRN    hydrALAZINE, 10 mg, Intravenous, Q4H PRN    metoclopramide HCl, 10 mg, Oral, Q8H PRN    naloxone, 0.02 mg, Intravenous, PRN    sodium chloride 0.9%, 10 mL, Intravenous, Q12H PRN      Infusions:          Fluid Balance:     Intake/Output Summary (Last 24 hours) at 11/14/2024 0947  Last data filed at 11/14/2024 0400  Gross per 24 hour   Intake 2629 ml   Output 1650 ml   Net 979 ml         Vital Signs:   Vitals:    11/14/24 0938   BP: (!) 148/83   Pulse:    Resp:    Temp:          Physical Exam  Vitals and nursing note reviewed.   Constitutional:       Appearance: He is ill-appearing.   HENT:      Head: Normocephalic.      Right Ear: External ear normal.      Left Ear: External ear normal.      Nose: Nose normal.      Mouth/Throat:      Mouth: Mucous membranes are moist.      Pharynx: Oropharynx is clear. No oropharyngeal exudate or posterior oropharyngeal erythema.   Eyes:      General: No scleral icterus.     Extraocular Movements: Extraocular movements intact.      Conjunctiva/sclera: Conjunctivae normal.      Pupils: Pupils are equal, round, and reactive to light.   Neck:      Comments: Tracheostomy in place, site clean and dry  Cardiovascular:      Rate and Rhythm: Normal rate and regular rhythm.      Pulses: Normal pulses.      Heart sounds: Normal heart sounds. No murmur heard.     No friction rub. No gallop.   Pulmonary:      Effort: Pulmonary effort is  "normal. No respiratory distress.      Breath sounds: Normal breath sounds. No stridor. No wheezing, rhonchi or rales.      Comments: On mechanical ventilation via tracheostomy tube, no dyssynchrony seen on mechanical ventilation, no accessory muscle use   Chest:      Chest wall: No tenderness.   Abdominal:      General: Abdomen is flat. Bowel sounds are normal. There is no distension.      Palpations: Abdomen is soft.      Tenderness: There is no abdominal tenderness. There is no rebound.      Comments: J-tube in place site clean and dry   Musculoskeletal:      Cervical back: Normal range of motion. No rigidity or tenderness.   Skin:     General: Skin is warm and dry.      Capillary Refill: Capillary refill takes less than 2 seconds.      Coloration: Skin is not jaundiced.      Findings: No bruising, erythema or rash.   Neurological:      Mental Status: Mental status is at baseline.      Comments: Nonverbal, opens eyes to loud name call, not moving any extremities randomly or on command   Psychiatric:      Comments: Unable to fully assess       Ventilator Settings  Vent Mode: A/C (11/14/24 0824)  Ventilator Initiated: Yes (10/20/24 1546)  Set Rate: 20 BPM (11/14/24 0824)  Vt Set: 500 mL (11/14/24 0824)  PEEP/CPAP: 8 cmH20 (11/14/24 0824)  Oxygen Concentration (%): 35 (11/14/24 0824)  Peak Airway Pressure: 26 cmH20 (11/14/24 0824)  Plateau Pressure: 3 cmH20 (11/01/24 0338)  Total Ve: 8.7 L/m (11/14/24 0824)  F/VT Ratio<105 (RSBI): (!) 80.99 (11/14/24 0824)      Laboratory Studies:   No results for input(s): "PH", "PCO2", "PO2", "HCO3", "POCSATURATED", "BE" in the last 24 hours.  No results for input(s): "WBC", "RBC", "HGB", "HCT", "PLT", "MCV", "MCH", "MCHC" in the last 24 hours.        No results for input(s): "GLUCOSE", "NA", "K", "CL", "CO2", "BUN", "CREATININE", "CALCIUM", "MG" in the last 24 hours.          Microbiology Data:   Microbiology Results (last 7 days)       Procedure Component Value Units Date/Time    " Blood Culture [[id number]]  (Normal) Collected: 11/12/24 1017    Order Status: Completed Specimen: Blood from Hand, Left Updated: 11/13/24 1101     Blood Culture No Growth At 24 Hours    Blood Culture [[id number]]  (Normal) Collected: 11/12/24 1017    Order Status: Completed Specimen: Blood from Hand, Right Updated: 11/13/24 1101     Blood Culture No Growth At 24 Hours    Blood Culture [[id number]]  (Normal) Collected: 11/08/24 0839    Order Status: Completed Specimen: Blood Updated: 11/13/24 1101     Blood Culture No Growth at 5 days    Blood Culture [[id number]]  (Normal) Collected: 11/08/24 0929    Order Status: Completed Specimen: Blood Updated: 11/13/24 1101     Blood Culture No Growth at 5 days    Respiratory Culture [[id number]]  (Abnormal)  (Susceptibility) Collected: 11/01/24 1619    Order Status: Completed Specimen: Respiratory from Sputum, Expectorated Updated: 11/11/24 0618     Respiratory Culture Many ACINETOBACTER BAUMANNII      Many Pseudomonas aeruginosa     GRAM STAIN Quality 2+      Many Gram Negative Rods      Few Yeast              Imaging:   CT Chest Abdomen Pelvis With IV Contrast (XPD) Routine Oral Contrast  Narrative: EXAMINATION:  CT CHEST ABDOMEN PELVIS WITH IV CONTRAST (XPD)    CLINICAL HISTORY:  fever;    TECHNIQUE:  Low dose axial images, sagittal and coronal reformations were obtained from the thoracic inlet to the pubic symphysis following the IV and oral contrast administration.  Automatic exposure control is utilized to reduce patient radiation exposure.    COMPARISON:  10/21/2024 and 08/19/2020    FINDINGS:  There is bilateral upper and lower lobe pneumonia with patchy interstitial and ground-glass infiltrates seen in both lungs.  There are bilateral pleural effusions.  Findings are worse on the right..    The thoracic aorta is normal in caliber..    Some reactive subcentimeter lymphadenopathy seen the mediastinum.    The heart is normal in size..    ..    The liver appears normal.   No liver mass or lesion is seen.  Portal and hepatic veins appear normal..    The gallbladder appears normal.  No gallstones are seen.    The spleen appears normal.  No splenic mass or lesion is seen.    The pancreas appears grossly unremarkable.  No pancreatic mass or lesion is seen.  No inflammation is seen.    No adrenal abnormality is seen.  No adrenal nodule is seen.    The kidneys are well perfused.  There is a cyst in the lower pole the right kidney.  No hydronephrosis is seen.  No hydroureter is seen.  No retroperitoneal abnormality is seen..    Visualized portions of the bowel shows no acute abnormality.  No colitis is seen.  No diverticulitis is seen.  No colonic mass is seen.    No free air is seen.  No free fluid is seen.    There is persistent urinary bladder wall thickening seen.  Bladder is also decompressed.    There is a sacral decubitus seen which was present on the prior examination as well and appears similar.    There are chronic dysplastic changes seen in the left hip with severe heterotrophic bone formation seen and changes consistent with previous trauma.  This is unchanged since the prior examination.    .  Impression: Diffuse bilateral upper and lower lobe pneumonia and bilateral pleural effusions.  Findings have progressed since the prior examination    Sacral decubitus relatively stable since prior examination    Persistent urinary bladder wall thickening possibly due to decompression versus cystitis    Electronically signed by: Erick Grant  Date:    11/12/2024  Time:    15:49          Assessment and Plan    Assessment:  Sepsis without shock with underlying VRE Enterococcus and ESBL Klebsiella bacteremia with Klebsiella and Proteus pneumonia on 10/20/10/24 (resolved)  -Acinetobacter and Pseudomonas positive sputum culture on 11/01/2024 with Acinetobacter sensitive to gentamicin and tobramycin and Pseudomonas sensitive to ciprofloxacin gentamicin and tobramycin, suspect colonization  without leukocytosis or increased sputum production or worsening hypoxia  Repeat pneumonia without sepsis and/or septic shock (11/08/2024)  -CXR on personal read showed new left sided infiltrate.   -blood cultures NTD  Chronic hypoxemic respiratory failure with tracheostomy on permanent mechanical ventilatory support secondary to prior cerebrovascular accident with subsequent hemorrhagic stroke status post craniectomy   AFib with RVR (rate controlled)  Chronic sacral decubitus ulcer present on admission  -PEG tube feed intolerance status post J-tube extension with tolerance of tube feeds  Altered mental status due to the above intracranial process    Plan:  -titrate mechanical ventilation for ARDS net protocol   -supplement oxygen to maintain saturation 94-96%   -routine tracheostomy care   -tube feeds to goal with free water flushes as appropriate   -continues on digoxin, Lopressor, and amiodarone; remains in afib, nothing further to do for Afib  -continue IV cipro 500 mg bid, ds bactrim bid, metronidazole 500 mg tid, and inhaled tobramycin per ID recs  -wound care for sacral decubitus ulcer debrided at bedside on 11/02 with wound VAC placement on 11/04, appreciate assistance from Wound Care and from General surgery  -palliative care consulted for goals of care discussion with family as overall prognosis remains poor; family desire to remain full code and continue all aggressive treatment at this time    DVT ppx/tx with Xarelto  GI ppx with protonix  Keep HOB elevated > 30*      Eugene Patel MD  11/14/2024  Pulmonology/Critical Care

## 2024-11-14 NOTE — PROGRESS NOTES
Inpatient Nutrition Assessment    Admit Date: 10/20/2024   Total duration of encounter: 25 days   Patient Age: 69 y.o.    Nutrition Recommendation/Prescription     Tube feeding recommendation:     Impact Peptide 1.5 goal rate 70 ml/hr to provide  2100 kcal/d  (111% est needs)  131 g protein/d (100% est needs)  1078 ml free water/d (52% est needs)  (calculations based on estimated 20 hr/d run time)     If no IV fluids running, can give 100ml q 2hr water flushes. Total water provided: 2078ml (100% est needs.)     Patel (provides 90 kcal, 2.5 g protein per serving) BID.    Consider per MD:  MVI +Fe 1 po daily  Vit C 500mg BID  Vit A 10,000 IU daily  Zinc sulfate 220mg Daily     Communication of Recommendations: reviewed with nurse    Nutrition Assessment     Malnutrition Assessment/Nutrition-Focused Physical Exam    Malnutrition Context: chronic illness (10/21/24 1028)  Malnutrition Level: severe (10/21/24 1028)  Energy Intake (Malnutrition):  (unable to eval) (10/21/24 1028)  Weight Loss (Malnutrition): greater than 10% in 6 months (10/21/24 1028)  Subcutaneous Fat (Malnutrition): severe depletion (10/21/24 1028)  Orbital Region (Subcutaneous Fat Loss): severe depletion  Upper Arm Region (Subcutaneous Fat Loss): severe depletion     Muscle Mass (Malnutrition): severe depletion (10/21/24 1028)     Clavicle Bone Region (Muscle Loss): severe depletion  Clavicle and Acromion Bone Region (Muscle Loss): severe depletion  Scapular Bone Region (Muscle Loss): severe depletion              Fluid Accumulation (Malnutrition):  (does not meet criteria) (10/21/24 1028)        A minimum of two characteristics is recommended for diagnosis of either severe or non-severe malnutrition.    Chart Review    Reason Seen: continuous nutrition monitoring and physician consult for TF    Malnutrition Screening Tool Results   Have you recently lost weight without trying?: Unsure  Have you been eating poorly because of a decreased appetite?: No    MST Score: 2   Diagnosis:  Severe sepsis   UTI  Afib with RVR  Hypernatremia   Sacral wound    Relevant Medical History:    Arthritis      Atrial fibrillation      BPH (benign prostatic hyperplasia)      Cardiac arrest      Coronary artery disease      Cyst, kidney, acquired      Diverticulosis      Hyperlipidemia      Hypertension      MI (myocardial infarction)      Obesity      Steatosis of liver      Stroke      Scheduled Medications:  amiodarone, 200 mg, Daily  ciprofloxacin HCl, 750 mg, Q12H  metoprolol tartrate, 25 mg, BID  pantoprazole, 40 mg, Daily  QUEtiapine, 50 mg, BID  rivaroxaban, 20 mg, Nightly  sodium chloride 3%, 4 mL, Q8H  sulfamethoxazole-trimethoprim 800-160mg, 1 tablet, BID    Continuous Infusions:       PRN Medications:  0.9%  NaCl infusion (for blood administration), , Q24H PRN  acetaminophen, 650 mg, Q6H PRN  dextromethorphan-guaiFENesin  mg/5 ml, 10 mL, Q4H PRN  dextrose 10%, 12.5 g, PRN  dextrose 10%, 25 g, PRN  diatrizoate meglumineand-diatrizoate sodium, 30 mL, ONCE PRN  glucagon (human recombinant), 1 mg, PRN  hydrALAZINE, 10 mg, Q4H PRN  metoclopramide HCl, 10 mg, Q8H PRN  naloxone, 0.02 mg, PRN  sodium chloride 0.9%, 10 mL, Q12H PRN    Calorie Containing IV Medications: no significant kcals from medications at this time    Recent Labs   Lab 11/08/24  0256 11/09/24  0457 11/09/24  0842 11/10/24  0251 11/11/24  0352 11/13/24  0310   *  --  145 144 143  --    K 4.0  --  3.9 3.8 4.1  --    CALCIUM 8.2*  --  7.8* 8.2* 8.4*  --    *  --  107 106 107  --    CO2 26  --  30 30 32*  --    BUN 19.8  --  22.0 21.9 19.8  --    CREATININE 0.52*  --  0.56* 0.55* 0.51*  --    EGFRNORACEVR >60  --  >60 >60 >60  --    GLUCOSE 96  --  116* 100 104  --    BILITOT 1.3  --  0.8 0.6 0.5  --    ALKPHOS 82  --  82 78 73  --    ALT 12  --  17 18 15  --    AST 21  --  29 25 18  --    ALBUMIN 1.8*  --  1.7* 1.7* 1.7*  --    WBC 16.48* 12.22*  --  11.08 8.81 9.96   HGB 10.2* 9.0*  --  9.5* 8.4*  "7.8*   HCT 30.8* 28.2*  --  31.7* 26.8* 24.2*     Nutrition Orders:  Diet NPO  Tube Feedings/Formulas 70; 1,400; Impact Peptide 1.5; Peg; 100; Every 2 hours,Tube Feedings/Formulas Other (see comments); Peg; Patel - Orange    Appetite/Oral Intake: not applicable/not applicable  Factors Affecting Nutritional Intake: on mechanical ventilation and tracheostomy  Social Needs Impacting Access to Food: none identified (from NH)  Food/Gnosticism/Cultural Preferences: unable to obtain  Food Allergies: no known food allergies  Last Bowel Movement: 11/14/24  Wound(s):[REMOVED]      Altered Skin Integrity 02/26/24 1100 lower Buttocks Moisture associated dermatitis-Tissue loss description: Full thickness       Wound 08/19/24 1500 Pressure Injury Left anterior Leg-Tissue loss description: Partial thickness       Wound 08/05/24 1420 Pressure Injury Left Knee-Tissue loss description: Partial thickness       Wound 10/20/24 2100 Pressure Injury Sacral spine-Tissue loss description: Full thickness       Wound 08/19/24 1500 Pressure Injury Left lateral Ankle-Tissue loss description: Partial thickness     Comments    10/21/24: Pt with previous EMR wts indicating wt loss. On NH TF. Also with increased protein needs due to wounds.     10/25/24: TF on hold. Pt continues with vomiting. On Reglan.     10/29/24: TF still off. Plans for gtube extension into jejunum.     11/1/24: TF @ 55ml/hr, plans to advance per RN. Pt now with G-J tube placement. Tolerated so far per RN.     11/5/24: TF continues. Tolerated per RN. Noted recent labs, may need to consider adding additional vitamin and minerals.     11/7/24: TF continues, tolerated @ goal rate per RN.     11/11/24;TF continues, tolerated @ goal rate per RN. Noted plans for family meeting today.     11/14/24: TF continues, toleratd per RN.     Anthropometrics    Height: 5' 9.02" (175.3 cm), Height Method: Estimated  Last Weight: 69.1 kg (152 lb 5.4 oz) (10/21/24 1026), Weight Method: Bed " Scale  BMI (Calculated): 22.5  BMI Classification: normal (BMI 18.5-24.9)        Ideal Body Weight (IBW), Male: 160.12 lb     % Ideal Body Weight, Male (lb): 95.21 %                 Usual Body Weight (UBW), k kg (per EMR from 24)  % Usual Body Weight: 76.94  % Weight Change From Usual Weight: -23.22 %  Usual Weight Provided By: EMR weight history    Wt Readings from Last 5 Encounters:   10/21/24 69.1 kg (152 lb 5.4 oz)   24 90.7 kg (199 lb 15.3 oz)   24 117.9 kg (260 lb)   24 117.9 kg (260 lb)   24 90.7 kg (200 lb)     Weight Change(s) Since Admission:   Wt Readings from Last 1 Encounters:   10/21/24 1026 69.1 kg (152 lb 5.4 oz)   10/21/24 0634 69.1 kg (152 lb 5.4 oz)   10/20/24 1535 63.5 kg (140 lb)   Admit Weight: 63.5 kg (140 lb) (10/20/24 1535), Weight Method: Estimated    Estimated Needs    Weight Used For Calorie Calculations: 69.1 kg (152 lb 5.4 oz)  Energy Calorie Requirements (kcal): 1886kcal (1.3 stress factor)  Energy Need Method: Ferry-St Jeor  Weight Used For Protein Calculations: 69.1 kg (152 lb 5.4 oz)  Protein Requirements: 124-138gm (1.8-2g/kg)  Fluid Requirements (mL): 2073ml (30ml/kg)  CHO Requirement: 210gm (45% est kcal needs)     Enteral Nutrition     Formula: Impact Peptide 1.5 [place]  Rate/Volume: 70ml/hr  Water Flushes: 100ml q2hr  Additives/Modulars: Patel  Route: PEG/J  Method: continuous  Total Nutrition Provided by Tube Feeding, Additives, and Flushes:  Calories Provided  2100 kcal/d, 111% needs   Protein Provided  131 g/d, 100% needs   Fluid Provided  2078 ml/d, 100% needs   Continuous feeding calculations based on estimated 20 hr/d run time unless otherwise stated.    Parenteral Nutrition     Patient not receiving parenteral nutrition support at this time.    Evaluation of Received Nutrient Intake    Calories: meeting estimated needs  Protein: meeting estimated needs    Patient Education     Not applicable.    Nutrition Diagnosis     PES: Inadequate  oral intake related to chronic illness as evidenced by trach/G-J tube. (active)     PES: Severe chronic disease or condition related malnutrition related to chronic illness as evidenced by severe fat depletion, severe muscle depletion, and greater than 10% weight loss in 6 months. (active)    Nutrition Interventions     Intervention(s): collaboration with other providers    Goal: Meet greater than 80% of nutritional needs by follow-up. (goal progressing)  Goal: Tolerate enteral feeding at goal rate by follow-up. (goal progressing)    Nutrition Goals & Monitoring     Dietitian will monitor: energy intake and enteral nutrition intake  Discharge planning: too early to determine; pending clinical course  Nutrition Risk/Follow-Up: high (follow-up in 1-4 days)   Please consult if re-assessment needed sooner.

## 2024-11-14 NOTE — PROGRESS NOTES
Infectious Disease  Progress Note    Patient Name: Delio Daniel Jr.   MRN: [id number]   Admission Date: 10/20/2024   Hospital Length of Stay: 25 days  Attending Physician: Jamil Hutchins Jr., MD,*   Primary Care Provider: Jl Briones MD     Isolation Status: Contact     Interval:   10/25 - afebrile. GOC ongoing. Blood is clear for 72 hrs, continue current regimen for #14 days, ID will sign off. If any changes in plan please call back   11/08:  Patient completed course of meropenem and linezolid on 11/05, started having worsening leukocytosis and had an episode of fever over the past 2 days.  ID reconsulted  Patient has multiple potential sources for infection, most concerning are Pulmonary, sacral ulcer, respiratory cultures noted.  He continues to be on minimal O2 requirements on the vent and does not appear to be in respiratory distress.  He does have a chest x-ray with concern of new infiltrates  0/25 - afebrile. GOC ongoing. Blood is clear for 72 hrs, continue current regimen for #14 days, ID will sign off. If any changes in plan please call back   11/08:  Patient completed course of meropenem and linezolid on 11/05, started having worsening leukocytosis and had an episode of fever over the past 2 days.  ID reconsulted  Patient has multiple potential sources for infection, most concerning are Pulmonary, sacral ulcer, respiratory cultures noted.  He continues to be on minimal O2 requirements on the vent and does not appear to be in respiratory distress.  He does have a chest x-ray with concern of new infiltrates  11/11- afebrile.  BCx in process.  Resp cx 11/01 with Acineto + Pseudomonas in wound, add tmp/smx + cipro/metro. Pan CT pending. Family meeting today  11/12 - febrile 101.4F. continue aggressive care. Obtain blood cx and pan CT.   11/13 - febrile 100.4. CT shows progressive pneumonia. Currently on targeted therapy, no changes in vent settings/requirements. Stool is more liquid today than  yesterday with increased output, send GI panel  11/14 - afebrile. GI panel negative. No changes made to antimicrobials, patient remains stable continue abx as outlined.  Assessment/Plan:   1) Bacteremia  - polymicrobial in setting of cardiac device  - BCx 10/20 - VRE and K. Pneumoniae  - BCx 10/22 - ngtd  - adequately treated     2) Pneumonia  - Resp Cx 11/01 - Acinetobacter baumanii (S-gent, bordy, tmp/smx) + Pseudomonas aeruginosa (S-avycaz, zerbaxa, cipro, gent, brody)  - CT 11/12- Diffuse bilateral upper and lower lobe pneumonia and bilateral pleural effusions.  Findings have progressed since the prior examination.Sacral decubitus relatively stable since prior examination   Persistent urinary bladder wall thickening possibly due to decompression versus cystitis  - BCx 11/12 - ngtd  - poor baseline function, remains at risk for infections with MDRO with  poor long term prognosis, family wishes to continue with agressive care  - currently on cipro/metro, #14 11/7 to 11/21  - currently on tmp/smx, #14 11/7 to 11/21    3) Decubitus ulcer  - bed bound  - offloading, nutrition  - wound is clean, no superinfection or tunneling to bone on 11/11 exam    Discussed and seen with RN    Thank you for your consult. ID will continue following. Please contact us with any questions or concerns.      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 On admission, noted to be hypotensive with Afib and RVR, recently had positive urine culture with Klebsiella and Proteus. He also has an advanced sacral ulcer.  He was noted to have bacteremia with 2/4 of admission blood cultures being positive for Enterococcus faecium and ultimately noting it is VRE per BCID. ID consulted for assistance in management.       Subjective:     Principal Problem: UTI (urinary tract infection)     Interval History:   No changes to overall clinical condition, remains obtunded and unable to participate in evaluation    Review of Systems   Review of Systems   Unable to perform ROS: Medical condition   Gastrointestinal:  Positive for diarrhea.        Objective:     Vital Signs (Most Recent):  Temp: 99.2 °F (37.3 °C) (11/14/24 0400)  Pulse: 103 (11/14/24 0822)  Resp: (!) 23 (11/14/24 0822)  BP: (!) 142/90 (11/14/24 0700)  SpO2: 100 % (11/14/24 0822)  Vital Signs (24h Range):  Temp:  [98.9 °F (37.2 °C)-100 °F (37.8 °C)] 99.2 °F (37.3 °C)  Pulse:  [] 103  Resp:  [17-36] 23  SpO2:  [86 %-100 %] 100 %  BP: ()/(49-90) 142/90      Weight:   Wt Readings from Last 1 Encounters:   10/21/24 69.1 kg (152 lb 5.4 oz)      Body mass index is Body mass index is 22.49 kg/m².     Estimated Creatinine Clearance: Estimated Creatinine Clearance: 135.5 mL/min (A) (based on SCr of 0.51 mg/dL (L)).     Lines/Drains/Airways       Drain  Duration                  Rectal Tube 10/25/24 2030 19 days         Gastrostomy/Enterostomy 10/30/24 1200 Gastrostomy-jejunostomy feeding 14 days    Male External Urinary Catheter 11/08/24 1540 5 days              Airway  Duration             Adult Surgical Airway 08/19/24 0120 Shiley Extra Large Cuffed Distal 6.0/ 75mm 87 days              Peripheral Intravenous Line  Duration                  Midline Catheter - Single Lumen 10/20/24 1530 Right 24 days                     Physical Exam  Physical Exam  Constitutional:       Appearance: He is not ill-appearing (Chronically ill-appearing).      Comments: Minimally interacting   HENT:      Head: Normocephalic and atraumatic.      Mouth/Throat:      Pharynx: No oropharyngeal exudate  "or posterior oropharyngeal erythema.   Eyes:      Extraocular Movements: Extraocular movements intact.      Pupils: Pupils are equal, round, and reactive to light.   Cardiovascular:      Rate and Rhythm: Normal rate and regular rhythm.      Heart sounds: No murmur heard.  Pulmonary:      Effort: No respiratory distress.      Breath sounds: No wheezing, rhonchi or rales.      Comments: Tracheostomy in place, ON VENT  Abdominal:      General: Bowel sounds are normal. There is no distension.      Palpations: Abdomen is soft.      Tenderness: There is no abdominal tenderness.      Comments: Peg tube in place   Genitourinary:     Comments: purewick  Rectal tube  Musculoskeletal:         General: No swelling or tenderness.      Cervical back: Neck supple. No rigidity or tenderness.      Comments: Deep sacral ulcer ongoing   Lymphadenopathy:      Cervical: No cervical adenopathy.   Skin:     Findings: No lesion or rash.   Neurological:      Mental Status: He is alert. Mental status is at baseline.      Comments: At baseline, nonverbal, interaction is minimal but alert and responsive          Significant Labs: CBC:   Recent Labs   Lab 11/13/24  0310   WBC 9.96   HGB 7.8*   HCT 24.2*        CMP:   No results for input(s): "NA", "K", "CL", "CO2", "GLU", "BUN", "CREATININE", "CALCIUM", "PROT", "ALBUMIN", "BILITOT", "ALKPHOS", "AST", "ALT", "ANIONGAP", "EGFRNONAA" in the last 48 hours.    Invalid input(s): "ESTGFAFR[place]"      Microbiology Results (last 7 days)       Procedure Component Value Units Date/Time    Blood Culture [[id number]]  (Normal) Collected: 11/12/24 1017    Order Status: Completed Specimen: Blood from Hand, Left Updated: 11/13/24 1101     Blood Culture No Growth At 24 Hours    Blood Culture [[id number]]  (Normal) Collected: 11/12/24 1017    Order Status: Completed Specimen: Blood from Hand, Right Updated: 11/13/24 1101     Blood Culture No Growth At 24 Hours    Blood Culture [[id number]]  (Normal) " Collected: 11/08/24 0839    Order Status: Completed Specimen: Blood Updated: 11/13/24 1101     Blood Culture No Growth at 5 days    Blood Culture [[id number]]  (Normal) Collected: 11/08/24 0929    Order Status: Completed Specimen: Blood Updated: 11/13/24 1101     Blood Culture No Growth at 5 days    Respiratory Culture [[id number]]  (Abnormal)  (Susceptibility) Collected: 11/01/24 1619    Order Status: Completed Specimen: Respiratory from Sputum, Expectorated Updated: 11/11/24 0618     Respiratory Culture Many ACINETOBACTER BAUMANNII      Many Pseudomonas aeruginosa     GRAM STAIN Quality 2+      Many Gram Negative Rods      Few Yeast             Significant Imaging: I have reviewed all pertinent imaging results/findings within the past 24 hours.      Kenneth Bhardwaj MD  Infectious Disease  [facility]

## 2024-11-14 NOTE — PROGRESS NOTES
[facility] - [facility] ICU  Wound Care  Progress Note    Patient Name: Delio Daniel Jr.  MRN: [id number]  Admission Date: 10/20/2024  Hospital Length of Stay: 25 days  Attending Physician: Jamil Hutchins Jr., MD,*  Primary Care Provider: Jl Briones MD     Subjective:     HPI:  Wound medicine re-check    The patient is a 69 year old male who presented to [facility] ED on 10/20/24 from [facility] with decreased responsiveness, sepsis, and recurrent UTI. Noted to be hypotensive with Afib and RVR, also requiring mechanical ventilation and admitted to the ICU.   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 Patient is non-verbal at baseline, contracted upper and lower extremities on left side. 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   Blood cultures on admission + Enterococcus faecium - VRE per BCID. Urine culture with Klebsiella and Proteus. ID guiding antibiotic stewardship.     Patient admitted with unstageable pressure ulcer of sacrum; seen by inpatient wound nurse and treatment was iniatated, wound medicine consulted for evaluation and possible debridement.   No family present at time of assessment, all information gained through chart review. In June 2024 appears that patient was seen by wound  for sacral pressure ulcer, no visit notes or images availabe from this time frame. First image of sacral region in May 2024 with wound of sacrum/coccyx. In July 2024 images appears that wound had healed with remaining dark discoloration of sacrum/buttocks and then again noted with wound in images of late August 2024; appears to have evolved and worsened up to this point.  Initial evaluation of wounds for this encounter done on 10/22/24 - several pressure ulcers with most concerning  is the sacral/coccyx unstageable ulcer. Also with fecal incontinence which is contaminating the wound as it is veryc close to the anus. He is contracted and we were unable to get full visualization and positioning for bedside debridement. Recommendations made for palliative care consultation  to discuss overall goals of care.   Palliative care consulted and discussion held with family in which they have decided to continue with treatment.Palliative met with family again on 11/11 with wishes to continue with aggressive supportive measures with full code status.   Bedside debridement of sacral ulcer with Surgery on 11/2/24.   Wound Vac applied on 11/4/24  ID continues to follow,  guiding antibiotic stewardship. BC from 11/8 normal; BC from 11/12 preliminary no growth at 48 hours.   11/14/24 - wound re-check today. Met patient in room IN02, he is awake, non-verbal with trach on mechanical ventilation. He is on ICU low air loss bed with bilateral heel boots in place.  Accompanied by ICU nurse as well as inpatient wound nurse. Difficult to reposition because of contractures. Febrile, elevated temps since 11/12; CT chest/abd/pelv on 11/12/24 Impression: diffuse bilateral upper and lower lobe pneumonia and bilateral pleural effusions. Findings have progressed since the prior examination. No acute distress. Tolerates repositioning for wound care.              Hospital Course:   No notes on file      Follow-up For: Procedure(s) (LRB):  EGD w/ Jtube placement (N/A)    Post-Operative Day: 15 Days Post-Op    Scheduled Meds:   amiodarone  200 mg Per G Tube Daily    ciprofloxacin HCl  750 mg Oral Q12H    metoprolol tartrate  25 mg Per G Tube BID    pantoprazole  40 mg Intravenous Daily    QUEtiapine  50 mg Per G Tube BID    rivaroxaban  20 mg Per G Tube Nightly    sodium chloride 3%  4 mL Nebulization Q8H    sulfamethoxazole-trimethoprim 800-160mg  1 tablet Oral BID     Continuous Infusions:  PRN Meds:  Current  Facility-Administered Medications:     0.9%  NaCl infusion (for blood administration), , Intravenous, Q24H PRN    acetaminophen, 650 mg, Per G Tube, Q6H PRN    dextromethorphan-guaiFENesin  mg/5 ml, 10 mL, Per G Tube, Q4H PRN    dextrose 10%, 12.5 g, Intravenous, PRN    dextrose 10%, 25 g, Intravenous, PRN    diatrizoate meglumineand-diatrizoate sodium, 30 mL, Oral, ONCE PRN    glucagon (human recombinant), 1 mg, Intramuscular, PRN    hydrALAZINE, 10 mg, Intravenous, Q4H PRN    metoclopramide HCl, 10 mg, Oral, Q8H PRN    naloxone, 0.02 mg, Intravenous, PRN    sodium chloride 0.9%, 10 mL, Intravenous, Q12H PRN    Review of Systems   Unable to perform ROS: Patient nonverbal     Objective:     Vital Signs (Most Recent):  Temp: 100 °F (37.8 °C) (11/14/24 0800)  Pulse: 90 (11/14/24 1015)  Resp: (!) 23 (11/14/24 1015)  BP: 117/67 (11/14/24 1015)  SpO2: 95 % (11/14/24 1015) Vital Signs (24h Range):  Temp:  [98.9 °F (37.2 °C)-100 °F (37.8 °C)] 100 °F (37.8 °C)  Pulse:  [] 90  Resp:  [20-36] 23  SpO2:  [86 %-100 %] 95 %  BP: ()/(49-90) 117/67     Weight: 69.1 kg (152 lb 5.4 oz)  Body mass index is 22.49 kg/m².     Physical Exam  Vitals reviewed.   Constitutional:       General: He is awake.      Comments: Rectal tube with watery green/yellow stool; strong odor   Some leakage around tube, wound vac in place, did not contaminate wound       HENT:      Head:      Comments: Right bone flap      Neck:      Comments: Tracheostomy   Pulmonary:      Comments: Mechanical ventilation    Abdominal:      Comments: PEG/J-Tube   Skin:     General: Skin is warm and dry.      Capillary Refill: Capillary refill takes less than 2 seconds.      Findings: Wound present.             Comments:     Left 1st and 2nd toe abrasions - JAYME   Neurological:      Comments: Left upper and lower extremity contracted  Moves RUE       Sacrum: 7 x 8 x 2 cm    Undermining from 1 - 5 cm and tunneling at 3 o'clock       Left lateral knee: 0.8 x  1 x 0.1 cm       Left lateral ankle; 0.4 x 0.4 cm                  Laboratory:  A1C:   Recent Labs   Lab 08/26/24  1221   HGBA1C 5.3     BMP:   Recent Labs   Lab 11/11/24  0352      K 4.1      CO2 32*   BUN 19.8   CREATININE 0.51*   CALCIUM 8.4*       CBC:   Recent Labs   Lab 11/13/24  0310   WBC 9.96   RBC 2.77*   HGB 7.8*   HCT 24.2*      MCV 87.4   MCH 28.2   MCHC 32.2*     CMP:   Recent Labs   Lab 11/11/24 0352   CALCIUM 8.4*   ALBUMIN 1.7*      K 4.1   CO2 32*      BUN 19.8   CREATININE 0.51*   ALKPHOS 73   ALT 15   AST 18   BILITOT 0.5     LFTs:   Recent Labs   Lab 11/11/24  0352   ALT 15   AST 18   ALKPHOS 73   BILITOT 0.5   ALBUMIN 1.7*       Microbiology Results (last 7 days)       Procedure Component Value Units Date/Time    Blood Culture [[id number]]  (Normal) Collected: 11/12/24 1017    Order Status: Completed Specimen: Blood from Hand, Left Updated: 11/14/24 1101     Blood Culture No Growth At 48 Hours    Blood Culture [[id number]]  (Normal) Collected: 11/12/24 1017    Order Status: Completed Specimen: Blood from Hand, Right Updated: 11/14/24 1101     Blood Culture No Growth At 48 Hours    Blood Culture [[id number]]  (Normal) Collected: 11/08/24 0839    Order Status: Completed Specimen: Blood Updated: 11/13/24 1101     Blood Culture No Growth at 5 days    Blood Culture [[id number]]  (Normal) Collected: 11/08/24 0929    Order Status: Completed Specimen: Blood Updated: 11/13/24 1101     Blood Culture No Growth at 5 days    Respiratory Culture [[id number]]  (Abnormal)  (Susceptibility) Collected: 11/01/24 1619    Order Status: Completed Specimen: Respiratory from Sputum, Expectorated Updated: 11/11/24 0618     Respiratory Culture Many ACINETOBACTER BAUMANNII      Many Pseudomonas aeruginosa     GRAM STAIN Quality 2+      Many Gram Negative Rods      Few Yeast              Diagnostic Results:  I have reviewed all pertinent imaging results/findings within the past 24  hours.      CT Chest Abdomen Pelvis With IV Contrast (XPD) Routine Oral Contrast  Status: Final result     MyChart Results Release    [facility] Status: Active  Results Release     PACS Images for [employer]     Show images for CT Chest Abdomen Pelvis With IV Contrast (XPD) Routine Oral Contrast  CT Chest Abdomen Pelvis With IV Contrast (XPD) Routine Oral Contrast  Order: [id number]  Status: Final result       Visible to patient: Yes (not seen)       Next appt: 08/21/2025 at 07:15 AM in Radiology ([facility]-CT2 500 LB LIMIT)    0 Result Notes  Details    Reading Physician Reading Date Result Priority   Nellie Grant MD  [phone] 11/12/2024 Routine     Narrative & Impression  EXAMINATION:  CT CHEST ABDOMEN PELVIS WITH IV CONTRAST (XPD)     CLINICAL HISTORY:  fever;     TECHNIQUE:  Low dose axial images, sagittal and coronal reformations were obtained from the thoracic inlet to the pubic symphysis following the IV and oral contrast administration.  Automatic exposure control is utilized to reduce patient radiation exposure.     COMPARISON:  10/21/2024 and 08/19/2020     FINDINGS:  There is bilateral upper and lower lobe pneumonia with patchy interstitial and ground-glass infiltrates seen in both lungs.  There are bilateral pleural effusions.  Findings are worse on the right..     The thoracic aorta is normal in caliber..     Some reactive subcentimeter lymphadenopathy seen the mediastinum.     The heart is normal in size..     ..     The liver appears normal.  No liver mass or lesion is seen.  Portal and hepatic veins appear normal..     The gallbladder appears normal.  No gallstones are seen.     The spleen appears normal.  No splenic mass or lesion is seen.     The pancreas appears grossly unremarkable.  No pancreatic mass or lesion is seen.  No inflammation is seen.     No adrenal abnormality is seen.  No adrenal nodule is seen.     The kidneys are well perfused.  There is a cyst in the lower  pole the right kidney.  No hydronephrosis is seen.  No hydroureter is seen.  No retroperitoneal abnormality is seen..     Visualized portions of the bowel shows no acute abnormality.  No colitis is seen.  No diverticulitis is seen.  No colonic mass is seen.     No free air is seen.  No free fluid is seen.     There is persistent urinary bladder wall thickening seen.  Bladder is also decompressed.     There is a sacral decubitus seen which was present on the prior examination as well and appears similar.     There are chronic dysplastic changes seen in the left hip with severe heterotrophic bone formation seen and changes consistent with previous trauma.  This is unchanged since the prior examination.     .     Impression:     Diffuse bilateral upper and lower lobe pneumonia and bilateral pleural effusions.  Findings have progressed since the prior examination     Sacral decubitus relatively stable since prior examination     Persistent urinary bladder wall thickening possibly due to decompression versus cystitis        Electronically signed by:Erick Grant  Date:                                            11/12/2024  Time:                                           15:49        Exam Ended: 11/12/24 15:18 CST         Assessment/Plan:     Previously Unstageable pressure ulcer of sacrum now with bone exposure and evident Stage 4 pressure ulcer - present on admission , bedside debridement per surgery 11/2/214; wound vac applied on 11/4/24 - scant exposed bone - probable osteomyelitis   Stage 3 pressure ulcer of left knee - present on admission  Stage 4 pressure ulcer of left lateral ankle - present on admission  CVA with residual cognitive/motor deficits; contractures   VRE bacteremia  Klebsiella bacteremia  Trach/vent and PEG tube dependent           PLAN:     Chart reviewed, patient examined and wounds assessed.  Opinion: Wounds re assessed today, no significant change.  Still with yellow slough covering depth of  wound, scant bone exposure.  No obvious signs of infection. Continue Wound Vac.   Left knee and ankle wounds without signs of infection. Continue to cleanse with Vashe, apply mupirocin ointment and adaptic or Versatel then cover with foam border daily.    Continue to pad left side rail where knee rubs to prevent further breakdown.   Wound care orders: Wound Vac - If it comes off or looses suction remove and resume previous wound care orders - Sacrum - cleanse with Vashe, apply Vashe moistened Mesalt to wound bed, cover with ABD pad and secure with cloth tape, BID and PRN.   Monitor for signs & symptoms of deterioration  Offloading of sacrum/buttocks/heels at all times: YOLETTE mattress, turning q 2 hrs; use of wedges and heel offloading devices to be used at all times while in bed; ( CHATA Zavala). This needs to be reinforced by every staff nurse caring for patient on every shift of every day.  Incontinence: control moisture/wound contamination: No briefs; use things such as purewick or underwood catheter; rectal tube  Nutrition: Follow RD  recommendations for tube feeds; GI following for frequent regurgitation of feeds, J-tube placement w/EGD on 10/30/24.   Will try to follow weekly while admitted, but every nurse assigned to patient on every shift of every day needs to address daily wound care dressing changes and offloading modalities including using heel offloading devices, wedges, YOLETTE mattress etc  Discussed with patient as well as nurse caring for patient today           The time spent including preparing to see the patient, obtaining patient history and assessment, evaluation of the plan of care, patient/caregiver counseling and education, orders, documentation, coordination of care, and other professional medical management activities for today's encounter was 60 minutes               TRUMAN Romeo  Wound Care  [facility] - [facility] ICU

## 2024-11-14 NOTE — PLAN OF CARE
Problem: Skin Injury Risk Increased  Goal: Skin Health and Integrity  Outcome: Not Progressing     Problem: Adult Inpatient Plan of Care  Goal: Plan of Care Review  Outcome: Not Progressing  Goal: Patient-Specific Goal (Individualized)  Outcome: Not Progressing  Goal: Absence of Hospital-Acquired Illness or Injury  Outcome: Not Progressing  Goal: Optimal Comfort and Wellbeing  Outcome: Not Progressing  Goal: Readiness for Transition of Care  Outcome: Not Progressing     Problem: Infection  Goal: Absence of Infection Signs and Symptoms  Outcome: Not Progressing     Problem: Sepsis/Septic Shock  Goal: Optimal Coping  Outcome: Not Progressing  Goal: Absence of Bleeding  Outcome: Not Progressing  Goal: Blood Glucose Level Within Targeted Range  Outcome: Not Progressing  Goal: Absence of Infection Signs and Symptoms  Outcome: Not Progressing  Goal: Optimal Nutrition Intake  Outcome: Not Progressing     Problem: Pneumonia  Goal: Fluid Balance  Outcome: Not Progressing  Goal: Resolution of Infection Signs and Symptoms  Outcome: Not Progressing  Goal: Effective Oxygenation and Ventilation  Outcome: Not Progressing     Problem: Wound  Goal: Optimal Coping  Outcome: Not Progressing  Goal: Optimal Functional Ability  Outcome: Not Progressing  Goal: Absence of Infection Signs and Symptoms  Outcome: Not Progressing  Goal: Improved Oral Intake  Outcome: Not Progressing  Goal: Optimal Pain Control and Function  Outcome: Not Progressing  Goal: Skin Health and Integrity  Outcome: Not Progressing  Goal: Optimal Wound Healing  Outcome: Not Progressing     Problem: Fall Injury Risk  Goal: Absence of Fall and Fall-Related Injury  Outcome: Not Progressing     Problem: Coping Ineffective  Goal: Effective Coping  Outcome: Not Progressing

## 2024-11-15 LAB
ALBUMIN SERPL-MCNC: 1.9 G/DL (ref 3.4–4.8)
ALBUMIN/GLOB SERPL: 0.5 RATIO (ref 1.1–2)
ALLENS TEST BLOOD GAS (OHS): YES
ALP SERPL-CCNC: 62 UNIT/L (ref 40–150)
ALT SERPL-CCNC: 12 UNIT/L (ref 0–55)
ANION GAP SERPL CALC-SCNC: 7 MEQ/L
AST SERPL-CCNC: 19 UNIT/L (ref 5–34)
BASE EXCESS BLD CALC-SCNC: 9.2 MMOL/L
BASOPHILS # BLD AUTO: 0.02 X10(3)/MCL
BASOPHILS NFR BLD AUTO: 0.2 %
BILIRUB SERPL-MCNC: 0.3 MG/DL
BLOOD GAS SAMPLE TYPE (OHS): ABNORMAL
BUN SERPL-MCNC: 18.1 MG/DL (ref 8.4–25.7)
CA-I BLD-SCNC: 1.07 MMOL/L (ref 1.12–1.23)
CALCIUM SERPL-MCNC: 7.5 MG/DL (ref 8.8–10)
CHLORIDE SERPL-SCNC: 106 MMOL/L (ref 98–107)
CO2 BLDA-SCNC: 35.6 MMOL/L
CO2 SERPL-SCNC: 27 MMOL/L (ref 23–31)
CREAT SERPL-MCNC: 0.53 MG/DL (ref 0.72–1.25)
CREAT/UREA NIT SERPL: 34
DRAWN BY BLOOD GAS (OHS): ABNORMAL
EOSINOPHIL # BLD AUTO: 0.23 X10(3)/MCL (ref 0–0.9)
EOSINOPHIL NFR BLD AUTO: 2.6 %
ERYTHROCYTE [DISTWIDTH] IN BLOOD BY AUTOMATED COUNT: 20.5 % (ref 11.5–17)
GFR SERPLBLD CREATININE-BSD FMLA CKD-EPI: >60 ML/MIN/1.73/M2
GLOBULIN SER-MCNC: 3.7 GM/DL (ref 2.4–3.5)
GLUCOSE SERPL-MCNC: 118 MG/DL (ref 82–115)
HCO3 BLDA-SCNC: 34.2 MMOL/L (ref 22–26)
HCT VFR BLD AUTO: 24.1 % (ref 42–52)
HGB BLD-MCNC: 7.6 G/DL (ref 14–18)
IMM GRANULOCYTES # BLD AUTO: 0.1 X10(3)/MCL (ref 0–0.04)
IMM GRANULOCYTES NFR BLD AUTO: 1.1 %
INHALED O2 CONCENTRATION: 30 %
LYMPHOCYTES # BLD AUTO: 1.3 X10(3)/MCL (ref 0.6–4.6)
LYMPHOCYTES NFR BLD AUTO: 14.6 %
MCH RBC QN AUTO: 28 PG (ref 27–31)
MCHC RBC AUTO-ENTMCNC: 31.5 G/DL (ref 33–36)
MCV RBC AUTO: 88.9 FL (ref 80–94)
MECH RR (OHS): 20 B/MIN
MODE (OHS): AC
MONOCYTES # BLD AUTO: 0.67 X10(3)/MCL (ref 0.1–1.3)
MONOCYTES NFR BLD AUTO: 7.5 %
NEUTROPHILS # BLD AUTO: 6.56 X10(3)/MCL (ref 2.1–9.2)
NEUTROPHILS NFR BLD AUTO: 74 %
NRBC BLD AUTO-RTO: 0 %
OXYGEN DEVICE BLOOD GAS (OHS): ABNORMAL
PCO2 BLDA: 47 MMHG (ref 35–45)
PEEP RESPIRATORY: 8 CMH2O
PH BLDA: 7.47 [PH] (ref 7.35–7.45)
PLATELET # BLD AUTO: 421 X10(3)/MCL (ref 130–400)
PMV BLD AUTO: 9.6 FL (ref 7.4–10.4)
PO2 BLDA: 97 MMHG (ref 80–100)
POCT GLUCOSE: 115 MG/DL (ref 70–110)
POCT GLUCOSE: 94 MG/DL (ref 70–110)
POTASSIUM BLOOD GAS (OHS): 3.7 MMOL/L (ref 3.5–5)
POTASSIUM SERPL-SCNC: 4 MMOL/L (ref 3.5–5.1)
PREALB SERPL-MCNC: 12.8 MG/DL (ref 16–42)
PROT SERPL-MCNC: 5.6 GM/DL (ref 5.8–7.6)
RBC # BLD AUTO: 2.71 X10(6)/MCL (ref 4.7–6.1)
SAMPLE SITE BLOOD GAS (OHS): ABNORMAL
SAO2 % BLDA: 98 %
SODIUM BLOOD GAS (OHS): 141 MMOL/L (ref 137–145)
SODIUM SERPL-SCNC: 140 MMOL/L (ref 136–145)
SPONT+MECH VT ON VENT: 500 ML
WBC # BLD AUTO: 8.88 X10(3)/MCL (ref 4.5–11.5)

## 2024-11-15 PROCEDURE — 63600175 PHARM REV CODE 636 W HCPCS: Performed by: STUDENT IN AN ORGANIZED HEALTH CARE EDUCATION/TRAINING PROGRAM

## 2024-11-15 PROCEDURE — 25000003 PHARM REV CODE 250: Performed by: GENERAL PRACTICE

## 2024-11-15 PROCEDURE — 25000003 PHARM REV CODE 250: Performed by: NURSE PRACTITIONER

## 2024-11-15 PROCEDURE — 36415 COLL VENOUS BLD VENIPUNCTURE: CPT

## 2024-11-15 PROCEDURE — 99900026 HC AIRWAY MAINTENANCE (STAT)

## 2024-11-15 PROCEDURE — 85025 COMPLETE CBC W/AUTO DIFF WBC: CPT

## 2024-11-15 PROCEDURE — 25000003 PHARM REV CODE 250

## 2024-11-15 PROCEDURE — 99900031 HC PATIENT EDUCATION (STAT)

## 2024-11-15 PROCEDURE — 99900035 HC TECH TIME PER 15 MIN (STAT)

## 2024-11-15 PROCEDURE — 80053 COMPREHEN METABOLIC PANEL: CPT

## 2024-11-15 PROCEDURE — 63600175 PHARM REV CODE 636 W HCPCS

## 2024-11-15 PROCEDURE — 25000003 PHARM REV CODE 250: Performed by: HOSPITALIST

## 2024-11-15 PROCEDURE — 36600 WITHDRAWAL OF ARTERIAL BLOOD: CPT

## 2024-11-15 PROCEDURE — 20000000 HC ICU ROOM

## 2024-11-15 PROCEDURE — 63600175 PHARM REV CODE 636 W HCPCS: Mod: JZ,JG | Performed by: INTERNAL MEDICINE

## 2024-11-15 PROCEDURE — 94003 VENT MGMT INPAT SUBQ DAY: CPT

## 2024-11-15 PROCEDURE — 25000242 PHARM REV CODE 250 ALT 637 W/ HCPCS: Performed by: INTERNAL MEDICINE

## 2024-11-15 PROCEDURE — 94668 MNPJ CHEST WALL SBSQ: CPT

## 2024-11-15 PROCEDURE — 94760 N-INVAS EAR/PLS OXIMETRY 1: CPT

## 2024-11-15 PROCEDURE — 27100171 HC OXYGEN HIGH FLOW UP TO 24 HOURS

## 2024-11-15 PROCEDURE — 94640 AIRWAY INHALATION TREATMENT: CPT

## 2024-11-15 PROCEDURE — 27000207 HC ISOLATION

## 2024-11-15 PROCEDURE — 82803 BLOOD GASES ANY COMBINATION: CPT

## 2024-11-15 PROCEDURE — 27200966 HC CLOSED SUCTION SYSTEM

## 2024-11-15 PROCEDURE — 94761 N-INVAS EAR/PLS OXIMETRY MLT: CPT | Mod: XB

## 2024-11-15 RX ORDER — ENOXAPARIN SODIUM 100 MG/ML
1 INJECTION SUBCUTANEOUS EVERY 12 HOURS
Status: DISCONTINUED | OUTPATIENT
Start: 2024-11-15 | End: 2024-11-18

## 2024-11-15 RX ADMIN — METRONIDAZOLE 500 MG: 500 INJECTION, SOLUTION INTRAVENOUS at 06:11

## 2024-11-15 RX ADMIN — Medication 4 ML: at 09:11

## 2024-11-15 RX ADMIN — AMIODARONE HYDROCHLORIDE 200 MG: 200 TABLET ORAL at 09:11

## 2024-11-15 RX ADMIN — SULFAMETHOXAZOLE AND TRIMETHOPRIM 1 TABLET: 800; 160 TABLET ORAL at 09:11

## 2024-11-15 RX ADMIN — QUETIAPINE FUMARATE 50 MG: 25 TABLET ORAL at 08:11

## 2024-11-15 RX ADMIN — ENOXAPARIN SODIUM 70 MG: 80 INJECTION SUBCUTANEOUS at 05:11

## 2024-11-15 RX ADMIN — SULFAMETHOXAZOLE AND TRIMETHOPRIM 1 TABLET: 800; 160 TABLET ORAL at 08:11

## 2024-11-15 RX ADMIN — QUETIAPINE FUMARATE 50 MG: 25 TABLET ORAL at 09:11

## 2024-11-15 RX ADMIN — Medication 4 ML: at 05:11

## 2024-11-15 RX ADMIN — METRONIDAZOLE 500 MG: 500 INJECTION, SOLUTION INTRAVENOUS at 11:11

## 2024-11-15 RX ADMIN — CIPROFOLXACIN 750 MG: 250 TABLET ORAL at 08:11

## 2024-11-15 RX ADMIN — METRONIDAZOLE 500 MG: 500 INJECTION, SOLUTION INTRAVENOUS at 03:11

## 2024-11-15 RX ADMIN — METOPROLOL TARTRATE 25 MG: 25 TABLET, FILM COATED ORAL at 08:11

## 2024-11-15 RX ADMIN — METOPROLOL TARTRATE 25 MG: 25 TABLET, FILM COATED ORAL at 09:11

## 2024-11-15 RX ADMIN — Medication 4 ML: at 12:11

## 2024-11-15 RX ADMIN — PANTOPRAZOLE SODIUM 40 MG: 40 INJECTION, POWDER, LYOPHILIZED, FOR SOLUTION INTRAVENOUS at 09:11

## 2024-11-15 RX ADMIN — CIPROFOLXACIN 750 MG: 250 TABLET ORAL at 09:11

## 2024-11-15 NOTE — PLAN OF CARE
Spoke to Anika at [place] regarding this patient returning to their facility this weekend. She stated they can order to wound vac today, however, they will not receive it over the weekend. She stated they would be able to receive it and be ready to accept him back on Monday. Made MIGUEL A Valero and the patient's nurse aware of this.

## 2024-11-15 NOTE — PROGRESS NOTES
Pulmonary & Critical Care Medicine   Progress Note      Presenting History/HPI:    Patient is a 68 y/o male with extensive PMH who presented from NH on 10/20/24 with decreased responsiveness, severe sepsis, and recurrent UTI. 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     Patient transferred to ED from [facility] with lethargy and tachycardia. Family at bedside reports patient is nonverbal at baseline but typically more alert. In the ED, patient is febrile, tachycardic with -190s, tachypneic, /60. EKG shows Afib with RVR. Diltiazem drip started in ED. Labs are significant for WBC of 16.5, lactic acid of 3.3, Cr 1.48, BUN 45.3, Na 155, K+ 5.1, troponin elevated at 0.118. UA with evidence of UTI. Of note, pt was being treated outpatient for a UTI, currently on day 4 of 7 day Rocephin course. Urine culture 10/16/24 with multidrug resistant Klebsiella pneumonia and Proteus mirabilis with susceptibility to Cefepime. Patient received 3L of NS and initiated on Vanc and Cefepime in ED. Admitted to the ICU on mechanical ventilation via trach.    Admitted to the ICU on 10/20   Peg tube extension to J-tube on 10/30    Interval History:  NAEON. Patient is afebrile overnight, VSS. Family still wants aggressive treatment, will hold on blood transfusion. Patient is alert but not following commands.    Scheduled Medications:    amiodarone  200 mg Per G Tube Daily    ciprofloxacin HCl  750 mg Oral Q12H    metoprolol tartrate  25 mg Per G Tube BID    metroNIDAZOLE IV (PEDS and ADULTS)  500 mg Intravenous Q8H    pantoprazole  40 mg Intravenous Daily    QUEtiapine  50 mg Per G Tube BID    rivaroxaban  20 mg Per G  Tube Nightly    sodium chloride 3%  4 mL Nebulization Q8H    sulfamethoxazole-trimethoprim 800-160mg  1 tablet Oral BID       PRN Medications:     Current Facility-Administered Medications:     0.9%  NaCl infusion (for blood administration), , Intravenous, Q24H PRN    acetaminophen, 650 mg, Per G Tube, Q6H PRN    dextromethorphan-guaiFENesin  mg/5 ml, 10 mL, Per G Tube, Q4H PRN    dextrose 10%, 12.5 g, Intravenous, PRN    dextrose 10%, 25 g, Intravenous, PRN    diatrizoate meglumineand-diatrizoate sodium, 30 mL, Oral, ONCE PRN    glucagon (human recombinant), 1 mg, Intramuscular, PRN    hydrALAZINE, 10 mg, Intravenous, Q4H PRN    metoclopramide HCl, 10 mg, Oral, Q8H PRN    naloxone, 0.02 mg, Intravenous, PRN    sodium chloride 0.9%, 10 mL, Intravenous, Q12H PRN      Infusions:          Fluid Balance:     Intake/Output Summary (Last 24 hours) at 11/15/2024 0549  Last data filed at 11/15/2024 0400  Gross per 24 hour   Intake 2361 ml   Output 1300 ml   Net 1061 ml         Vital Signs:   Vitals:    11/15/24 0515   BP:    Pulse: 90   Resp: (!) 26   Temp:          Physical Exam  Vitals and nursing note reviewed.   Constitutional:       Appearance: He is ill-appearing.   HENT:      Head: Normocephalic.      Right Ear: External ear normal.      Left Ear: External ear normal.      Nose: Nose normal.      Mouth/Throat:      Mouth: Mucous membranes are moist.      Pharynx: Oropharynx is clear. No oropharyngeal exudate or posterior oropharyngeal erythema.   Eyes:      General: No scleral icterus.     Extraocular Movements: Extraocular movements intact.      Conjunctiva/sclera: Conjunctivae normal.      Pupils: Pupils are equal, round, and reactive to light.   Neck:      Comments: Tracheostomy in place, site clean and dry  Cardiovascular:      Rate and Rhythm: Normal rate and regular rhythm.      Pulses: Normal pulses.      Heart sounds: Normal heart sounds. No murmur heard.     No friction rub. No gallop.   Pulmonary:      " Effort: Pulmonary effort is normal. No respiratory distress.      Breath sounds: Normal breath sounds. No stridor. No wheezing, rhonchi or rales.      Comments: On mechanical ventilation via tracheostomy tube, no dyssynchrony seen on mechanical ventilation, no accessory muscle use   Chest:      Chest wall: No tenderness.   Abdominal:      General: Abdomen is flat. Bowel sounds are normal. There is no distension.      Palpations: Abdomen is soft.      Tenderness: There is no abdominal tenderness. There is no rebound.      Comments: J-tube in place site clean and dry   Musculoskeletal:      Cervical back: Normal range of motion. No rigidity or tenderness.   Skin:     General: Skin is warm and dry.      Capillary Refill: Capillary refill takes less than 2 seconds.      Coloration: Skin is not jaundiced.      Findings: No bruising, erythema or rash.   Neurological:      Mental Status: Mental status is at baseline.      Comments: Nonverbal, opens eyes to loud name call, not moving any extremities randomly or on command   Psychiatric:      Comments: Unable to fully assess       Ventilator Settings  Vent Mode: A/C (11/15/24 0240)  Ventilator Initiated: Yes (10/20/24 1546)  Set Rate: 20 BPM (11/15/24 0240)  Vt Set: 500 mL (11/15/24 0240)  PEEP/CPAP: 8 cmH20 (11/15/24 0240)  Oxygen Concentration (%): 30 (11/15/24 0400)  Peak Airway Pressure: 22 cmH20 (11/15/24 0240)  Plateau Pressure: 3 cmH20 (11/01/24 0338)  Total Ve: 11.6 L/m (11/15/24 0240)  F/VT Ratio<105 (RSBI): (!) 73.81 (11/15/24 0240)      Laboratory Studies:   No results for input(s): "PH", "PCO2", "PO2", "HCO3", "POCSATURATED", "BE" in the last 24 hours.  No results for input(s): "WBC", "RBC", "HGB", "HCT", "PLT", "MCV", "MCH", "MCHC" in the last 24 hours.        No results for input(s): "GLUCOSE", "NA", "K", "CL", "CO2", "BUN", "CREATININE", "CALCIUM", "MG" in the last 24 hours.          Microbiology Data:   Microbiology Results (last 7 days)       Procedure " Component Value Units Date/Time    Blood Culture [[id number]]  (Normal) Collected: 11/12/24 1017    Order Status: Completed Specimen: Blood from Hand, Left Updated: 11/14/24 1101     Blood Culture No Growth At 48 Hours    Blood Culture [[id number]]  (Normal) Collected: 11/12/24 1017    Order Status: Completed Specimen: Blood from Hand, Right Updated: 11/14/24 1101     Blood Culture No Growth At 48 Hours    Blood Culture [[id number]]  (Normal) Collected: 11/08/24 0839    Order Status: Completed Specimen: Blood Updated: 11/13/24 1101     Blood Culture No Growth at 5 days    Blood Culture [[id number]]  (Normal) Collected: 11/08/24 0929    Order Status: Completed Specimen: Blood Updated: 11/13/24 1101     Blood Culture No Growth at 5 days    Respiratory Culture [[id number]]  (Abnormal)  (Susceptibility) Collected: 11/01/24 1619    Order Status: Completed Specimen: Respiratory from Sputum, Expectorated Updated: 11/11/24 0618     Respiratory Culture Many ACINETOBACTER BAUMANNII      Many Pseudomonas aeruginosa     GRAM STAIN Quality 2+      Many Gram Negative Rods      Few Yeast              Imaging:   CT Chest Abdomen Pelvis With IV Contrast (XPD) Routine Oral Contrast  Narrative: EXAMINATION:  CT CHEST ABDOMEN PELVIS WITH IV CONTRAST (XPD)    CLINICAL HISTORY:  fever;    TECHNIQUE:  Low dose axial images, sagittal and coronal reformations were obtained from the thoracic inlet to the pubic symphysis following the IV and oral contrast administration.  Automatic exposure control is utilized to reduce patient radiation exposure.    COMPARISON:  10/21/2024 and 08/19/2020    FINDINGS:  There is bilateral upper and lower lobe pneumonia with patchy interstitial and ground-glass infiltrates seen in both lungs.  There are bilateral pleural effusions.  Findings are worse on the right..    The thoracic aorta is normal in caliber..    Some reactive subcentimeter lymphadenopathy seen the mediastinum.    The heart is normal in  size..    ..    The liver appears normal.  No liver mass or lesion is seen.  Portal and hepatic veins appear normal..    The gallbladder appears normal.  No gallstones are seen.    The spleen appears normal.  No splenic mass or lesion is seen.    The pancreas appears grossly unremarkable.  No pancreatic mass or lesion is seen.  No inflammation is seen.    No adrenal abnormality is seen.  No adrenal nodule is seen.    The kidneys are well perfused.  There is a cyst in the lower pole the right kidney.  No hydronephrosis is seen.  No hydroureter is seen.  No retroperitoneal abnormality is seen..    Visualized portions of the bowel shows no acute abnormality.  No colitis is seen.  No diverticulitis is seen.  No colonic mass is seen.    No free air is seen.  No free fluid is seen.    There is persistent urinary bladder wall thickening seen.  Bladder is also decompressed.    There is a sacral decubitus seen which was present on the prior examination as well and appears similar.    There are chronic dysplastic changes seen in the left hip with severe heterotrophic bone formation seen and changes consistent with previous trauma.  This is unchanged since the prior examination.    .  Impression: Diffuse bilateral upper and lower lobe pneumonia and bilateral pleural effusions.  Findings have progressed since the prior examination    Sacral decubitus relatively stable since prior examination    Persistent urinary bladder wall thickening possibly due to decompression versus cystitis    Electronically signed by: Erick Grant  Date:    11/12/2024  Time:    15:49          Assessment and Plan    Assessment:  Sepsis without shock with underlying VRE Enterococcus and ESBL Klebsiella bacteremia with Klebsiella and Proteus pneumonia on 10/20/10/24 (resolved)  -Acinetobacter and Pseudomonas positive sputum culture on 11/01/2024 with Acinetobacter sensitive to gentamicin and tobramycin and Pseudomonas sensitive to ciprofloxacin  gentamicin and tobramycin, suspect colonization without leukocytosis or increased sputum production or worsening hypoxia  Repeat pneumonia without sepsis and/or septic shock (11/08/2024)  -CXR on personal read showed new left sided infiltrate.   -blood cultures NTD  Chronic hypoxemic respiratory failure with tracheostomy on permanent mechanical ventilatory support secondary to prior cerebrovascular accident with subsequent hemorrhagic stroke status post craniectomy   AFib with RVR (rate controlled)  Chronic sacral decubitus ulcer present on admission  -PEG tube feed intolerance status post J-tube extension with tolerance of tube feeds  Altered mental status due to the above intracranial process    Plan:  -titrate mechanical ventilation for ARDS net protocol   -supplement oxygen to maintain saturation 94-96%   -routine tracheostomy care   -tube feeds to goal with free water flushes as appropriate   -continues on digoxin, Lopressor, and amiodarone; remains in afib, nothing further to do for Afib  -continue IV cipro 500 mg bid, ds bactrim bid, metronidazole 500 mg tid, and inhaled tobramycin per ID recs  -wound care for sacral decubitus ulcer debrided at bedside on 11/02 with wound VAC placement on 11/04, appreciate assistance from Wound Care and from General surgery  -palliative care consulted for goals of care discussion with family as overall prognosis remains poor; family desire to remain full code and continue all aggressive treatment at this time    DVT ppx/tx with Xarelto  GI ppx with protonix  Keep HOB elevated > 30*      Eugene Patel MD  11/15/2024  Pulmonology/Critical Care

## 2024-11-15 NOTE — PLAN OF CARE
Problem: Skin Injury Risk Increased  Goal: Skin Health and Integrity  Outcome: Progressing     Problem: Adult Inpatient Plan of Care  Goal: Plan of Care Review  Outcome: Progressing  Goal: Patient-Specific Goal (Individualized)  Outcome: Progressing  Goal: Absence of Hospital-Acquired Illness or Injury  Outcome: Progressing  Goal: Optimal Comfort and Wellbeing  Outcome: Progressing  Goal: Readiness for Transition of Care  Outcome: Progressing     Problem: Infection  Goal: Absence of Infection Signs and Symptoms  Outcome: Progressing     Problem: Sepsis/Septic Shock  Goal: Optimal Coping  Outcome: Progressing  Goal: Absence of Bleeding  Outcome: Progressing  Goal: Blood Glucose Level Within Targeted Range  Outcome: Progressing  Goal: Absence of Infection Signs and Symptoms  Outcome: Progressing  Goal: Optimal Nutrition Intake  Outcome: Progressing     Problem: Pneumonia  Goal: Fluid Balance  Outcome: Progressing  Goal: Resolution of Infection Signs and Symptoms  Outcome: Progressing  Goal: Effective Oxygenation and Ventilation  Outcome: Progressing     Problem: Wound  Goal: Optimal Coping  Outcome: Progressing  Goal: Optimal Functional Ability  Outcome: Progressing  Goal: Absence of Infection Signs and Symptoms  Outcome: Progressing  Goal: Improved Oral Intake  Outcome: Progressing  Goal: Optimal Pain Control and Function  Outcome: Progressing  Goal: Skin Health and Integrity  Outcome: Progressing  Goal: Optimal Wound Healing  Outcome: Progressing     Problem: Fall Injury Risk  Goal: Absence of Fall and Fall-Related Injury  Outcome: Progressing     Problem: Coping Ineffective  Goal: Effective Coping  Outcome: Progressing     Problem: Mechanical Ventilation Invasive  Goal: Effective Communication  Outcome: Progressing  Goal: Optimal Device Function  Outcome: Progressing  Goal: Mechanical Ventilation Liberation  Outcome: Progressing  Goal: Optimal Nutrition Delivery  Outcome: Progressing  Goal: Absence of  Device-Related Skin and Tissue Injury  Outcome: Progressing  Goal: Absence of Ventilator-Induced Lung Injury  Outcome: Progressing     Problem: Artificial Airway  Goal: Effective Communication  Outcome: Progressing  Goal: Optimal Device Function  Outcome: Progressing  Goal: Absence of Device-Related Skin or Tissue Injury  Outcome: Progressing

## 2024-11-15 NOTE — PLAN OF CARE
Noted referral for palliative care. Sent referral for palliative at NH to [facility] with note to call daughter Carmen.

## 2024-11-15 NOTE — PROGRESS NOTES
Advance Care Planning     Date: 11/15/2024    Today a voluntary meeting took place: bedside    Patient Participation: Patient is unable to participate     Attendees (Name and  Relationship to patient):  Patient's daughter Carmen at bedside    Staff attendees (Name and  Role): Jovana AKINS RN-CHPN    ACP Conversation (General): Understanding of current condition we discussed in detail patient's possible needs upon discharge, at this time family is electing to return back to the nursing home and would like for patient to continue receiving full treatments. Daughter at bedside asked about palliative care program upon discharge to continue to discuss goals of care,  states understanding that patient's current medical condition will continue to progress and hospitalizations will more likely become more frequent and closer together despite optimal care provided to the patient.Encouraged continue discussion of patient's code status and medical wishes. Will place referral to palliative care program for review per daughterCarmen, request.       Code Status: Full Code    Goals of care: The family endorses that what is most important right now is to focus on extending life as long as possible, even it it means sacrificing quality and curative/life-prolongation (regardless of treatment burdens)    Accordingly, we have decided that the best plan to meet the patient's goals includes continuing with treatment      Recommendations/  Follow-up tasks: The patient and health care agent were provided the following recommendations Palliative Program upon discharge if able to have in nursing facility.        Hospice  I did re-explain the role for hospice care at this stage of the patient's illness, including its ability to help the patient live with the best quality of life possible.  At this time the patient's daughter reports that the family would like for the patient to beable to return to the hospital for care if needed upon  discharge, and at this time is not ready for hospice. Patient's daughter requested for outpatient palliative care program to follow patient upon discharge if nursing facility is able to accommodate.

## 2024-11-16 LAB
ALBUMIN SERPL-MCNC: 1.9 G/DL (ref 3.4–4.8)
ALBUMIN/GLOB SERPL: 0.5 RATIO (ref 1.1–2)
ALP SERPL-CCNC: 68 UNIT/L (ref 40–150)
ALT SERPL-CCNC: 12 UNIT/L (ref 0–55)
ANION GAP SERPL CALC-SCNC: 5 MEQ/L
AST SERPL-CCNC: 19 UNIT/L (ref 5–34)
BASOPHILS # BLD AUTO: 0.04 X10(3)/MCL
BASOPHILS NFR BLD AUTO: 0.5 %
BILIRUB SERPL-MCNC: 0.3 MG/DL
BUN SERPL-MCNC: 18 MG/DL (ref 8.4–25.7)
CALCIUM SERPL-MCNC: 7.5 MG/DL (ref 8.8–10)
CHLORIDE SERPL-SCNC: 107 MMOL/L (ref 98–107)
CO2 SERPL-SCNC: 26 MMOL/L (ref 23–31)
CREAT SERPL-MCNC: 0.56 MG/DL (ref 0.72–1.25)
CREAT/UREA NIT SERPL: 32
EOSINOPHIL # BLD AUTO: 0.16 X10(3)/MCL (ref 0–0.9)
EOSINOPHIL NFR BLD AUTO: 1.8 %
ERYTHROCYTE [DISTWIDTH] IN BLOOD BY AUTOMATED COUNT: 20.1 % (ref 11.5–17)
GFR SERPLBLD CREATININE-BSD FMLA CKD-EPI: >60 ML/MIN/1.73/M2
GLOBULIN SER-MCNC: 3.6 GM/DL (ref 2.4–3.5)
GLUCOSE SERPL-MCNC: 111 MG/DL (ref 82–115)
HCT VFR BLD AUTO: 23.8 % (ref 42–52)
HGB BLD-MCNC: 7.4 G/DL (ref 14–18)
IMM GRANULOCYTES # BLD AUTO: 0.12 X10(3)/MCL (ref 0–0.04)
IMM GRANULOCYTES NFR BLD AUTO: 1.4 %
LYMPHOCYTES # BLD AUTO: 1.44 X10(3)/MCL (ref 0.6–4.6)
LYMPHOCYTES NFR BLD AUTO: 16.4 %
MCH RBC QN AUTO: 27.8 PG (ref 27–31)
MCHC RBC AUTO-ENTMCNC: 31.1 G/DL (ref 33–36)
MCV RBC AUTO: 89.5 FL (ref 80–94)
MONOCYTES # BLD AUTO: 0.63 X10(3)/MCL (ref 0.1–1.3)
MONOCYTES NFR BLD AUTO: 7.2 %
NEUTROPHILS # BLD AUTO: 6.39 X10(3)/MCL (ref 2.1–9.2)
NEUTROPHILS NFR BLD AUTO: 72.7 %
NRBC BLD AUTO-RTO: 0 %
PLATELET # BLD AUTO: 432 X10(3)/MCL (ref 130–400)
PMV BLD AUTO: 9.4 FL (ref 7.4–10.4)
POCT GLUCOSE: 100 MG/DL (ref 70–110)
POCT GLUCOSE: 81 MG/DL (ref 70–110)
POTASSIUM SERPL-SCNC: 4 MMOL/L (ref 3.5–5.1)
PROT SERPL-MCNC: 5.5 GM/DL (ref 5.8–7.6)
RBC # BLD AUTO: 2.66 X10(6)/MCL (ref 4.7–6.1)
SODIUM SERPL-SCNC: 138 MMOL/L (ref 136–145)
WBC # BLD AUTO: 8.78 X10(3)/MCL (ref 4.5–11.5)

## 2024-11-16 PROCEDURE — 25000003 PHARM REV CODE 250: Performed by: HOSPITALIST

## 2024-11-16 PROCEDURE — 99900035 HC TECH TIME PER 15 MIN (STAT)

## 2024-11-16 PROCEDURE — 27000207 HC ISOLATION

## 2024-11-16 PROCEDURE — 63600175 PHARM REV CODE 636 W HCPCS

## 2024-11-16 PROCEDURE — 25000003 PHARM REV CODE 250

## 2024-11-16 PROCEDURE — 94640 AIRWAY INHALATION TREATMENT: CPT

## 2024-11-16 PROCEDURE — 20000000 HC ICU ROOM

## 2024-11-16 PROCEDURE — 25000003 PHARM REV CODE 250: Performed by: NURSE PRACTITIONER

## 2024-11-16 PROCEDURE — 63600175 PHARM REV CODE 636 W HCPCS: Mod: JZ,JG | Performed by: INTERNAL MEDICINE

## 2024-11-16 PROCEDURE — 94003 VENT MGMT INPAT SUBQ DAY: CPT

## 2024-11-16 PROCEDURE — 94668 MNPJ CHEST WALL SBSQ: CPT

## 2024-11-16 PROCEDURE — 25000003 PHARM REV CODE 250: Performed by: GENERAL PRACTICE

## 2024-11-16 PROCEDURE — 80053 COMPREHEN METABOLIC PANEL: CPT

## 2024-11-16 PROCEDURE — 94760 N-INVAS EAR/PLS OXIMETRY 1: CPT

## 2024-11-16 PROCEDURE — 99900026 HC AIRWAY MAINTENANCE (STAT)

## 2024-11-16 PROCEDURE — 99900022

## 2024-11-16 PROCEDURE — 99900031 HC PATIENT EDUCATION (STAT)

## 2024-11-16 PROCEDURE — 36415 COLL VENOUS BLD VENIPUNCTURE: CPT

## 2024-11-16 PROCEDURE — 85025 COMPLETE CBC W/AUTO DIFF WBC: CPT

## 2024-11-16 PROCEDURE — 94761 N-INVAS EAR/PLS OXIMETRY MLT: CPT

## 2024-11-16 PROCEDURE — 25000242 PHARM REV CODE 250 ALT 637 W/ HCPCS: Performed by: INTERNAL MEDICINE

## 2024-11-16 PROCEDURE — 27100171 HC OXYGEN HIGH FLOW UP TO 24 HOURS

## 2024-11-16 PROCEDURE — 63600175 PHARM REV CODE 636 W HCPCS: Performed by: STUDENT IN AN ORGANIZED HEALTH CARE EDUCATION/TRAINING PROGRAM

## 2024-11-16 PROCEDURE — 27200966 HC CLOSED SUCTION SYSTEM

## 2024-11-16 RX ORDER — METRONIDAZOLE 500 MG/1
500 TABLET ORAL EVERY 8 HOURS
Status: DISCONTINUED | OUTPATIENT
Start: 2024-11-16 | End: 2024-11-18 | Stop reason: HOSPADM

## 2024-11-16 RX ADMIN — CIPROFOLXACIN 750 MG: 250 TABLET ORAL at 09:11

## 2024-11-16 RX ADMIN — Medication 4 ML: at 04:11

## 2024-11-16 RX ADMIN — METRONIDAZOLE 500 MG: 500 INJECTION, SOLUTION INTRAVENOUS at 09:11

## 2024-11-16 RX ADMIN — METOPROLOL TARTRATE 25 MG: 25 TABLET, FILM COATED ORAL at 08:11

## 2024-11-16 RX ADMIN — ENOXAPARIN SODIUM 70 MG: 80 INJECTION SUBCUTANEOUS at 04:11

## 2024-11-16 RX ADMIN — ENOXAPARIN SODIUM 70 MG: 80 INJECTION SUBCUTANEOUS at 05:11

## 2024-11-16 RX ADMIN — GUAIFENESIN AND DEXTROMETHORPHAN 10 ML: 100; 10 SYRUP ORAL at 11:11

## 2024-11-16 RX ADMIN — METRONIDAZOLE 500 MG: 500 TABLET ORAL at 04:11

## 2024-11-16 RX ADMIN — Medication 4 ML: at 08:11

## 2024-11-16 RX ADMIN — QUETIAPINE FUMARATE 50 MG: 25 TABLET ORAL at 09:11

## 2024-11-16 RX ADMIN — METOPROLOL TARTRATE 25 MG: 25 TABLET, FILM COATED ORAL at 09:11

## 2024-11-16 RX ADMIN — METRONIDAZOLE 500 MG: 500 TABLET ORAL at 09:11

## 2024-11-16 RX ADMIN — SULFAMETHOXAZOLE AND TRIMETHOPRIM 1 TABLET: 800; 160 TABLET ORAL at 09:11

## 2024-11-16 RX ADMIN — AMIODARONE HYDROCHLORIDE 200 MG: 200 TABLET ORAL at 09:11

## 2024-11-16 RX ADMIN — SULFAMETHOXAZOLE AND TRIMETHOPRIM 1 TABLET: 800; 160 TABLET ORAL at 08:11

## 2024-11-16 RX ADMIN — QUETIAPINE FUMARATE 50 MG: 25 TABLET ORAL at 08:11

## 2024-11-16 RX ADMIN — PANTOPRAZOLE SODIUM 40 MG: 40 INJECTION, POWDER, LYOPHILIZED, FOR SOLUTION INTRAVENOUS at 09:11

## 2024-11-16 NOTE — PROGRESS NOTES
Pulmonary & Critical Care Medicine   Progress Note      Presenting History/HPI:    Patient is a 70 y/o male with extensive PMH who presented from NH on 10/20/24 with decreased responsiveness, severe sepsis, and recurrent UTI. 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     Patient transferred to ED from [facility] with lethargy and tachycardia. Family at bedside reports patient is nonverbal at baseline but typically more alert. In the ED, patient is febrile, tachycardic with -190s, tachypneic, /60. EKG shows Afib with RVR. Diltiazem drip started in ED. Labs are significant for WBC of 16.5, lactic acid of 3.3, Cr 1.48, BUN 45.3, Na 155, K+ 5.1, troponin elevated at 0.118. UA with evidence of UTI. Of note, pt was being treated outpatient for a UTI, currently on day 4 of 7 day Rocephin course. Urine culture 10/16/24 with multidrug resistant Klebsiella pneumonia and Proteus mirabilis with susceptibility to Cefepime. Patient received 3L of NS and initiated on Vanc and Cefepime in ED. Admitted to the ICU on mechanical ventilation via trach.    Admitted to the ICU on 10/20   Peg tube extension to J-tube on 10/30    Interval History:  NAEON. Patient is afebrile overnight, VSS. H/H stable, kidney function stable.  Net fluid 1568 over the last 24 hours. Family wants to discharge patient back to nursing home with palliative care.  Nursing home will be able to get wound VAC on Monday.  Scheduled Medications:    amiodarone  200 mg Per G Tube Daily    ciprofloxacin HCl  750 mg Oral Q12H    enoxparin  1 mg/kg Subcutaneous Q12H (treatment, non-standard time)    metoprolol tartrate  25 mg Per G Tube BID     metroNIDAZOLE IV (PEDS and ADULTS)  500 mg Intravenous Q8H    pantoprazole  40 mg Intravenous Daily    QUEtiapine  50 mg Per G Tube BID    sodium chloride 3%  4 mL Nebulization Q8H    sulfamethoxazole-trimethoprim 800-160mg  1 tablet Oral BID       PRN Medications:     Current Facility-Administered Medications:     0.9%  NaCl infusion (for blood administration), , Intravenous, Q24H PRN    acetaminophen, 650 mg, Per G Tube, Q6H PRN    dextromethorphan-guaiFENesin  mg/5 ml, 10 mL, Per G Tube, Q4H PRN    dextrose 10%, 12.5 g, Intravenous, PRN    dextrose 10%, 25 g, Intravenous, PRN    diatrizoate meglumineand-diatrizoate sodium, 30 mL, Oral, ONCE PRN    glucagon (human recombinant), 1 mg, Intramuscular, PRN    hydrALAZINE, 10 mg, Intravenous, Q4H PRN    metoclopramide HCl, 10 mg, Oral, Q8H PRN    naloxone, 0.02 mg, Intravenous, PRN    sodium chloride 0.9%, 10 mL, Intravenous, Q12H PRN      Infusions:          Fluid Balance:     Intake/Output Summary (Last 24 hours) at 11/16/2024 0441  Last data filed at 11/16/2024 0400  Gross per 24 hour   Intake 2668.04 ml   Output 1100 ml   Net 1568.04 ml         Vital Signs:   Vitals:    11/16/24 0430   BP:    Pulse: 97   Resp: (!) 37   Temp:          Physical Exam  Vitals and nursing note reviewed.   Constitutional:       Appearance: He is ill-appearing.   HENT:      Head: Normocephalic.      Right Ear: External ear normal.      Left Ear: External ear normal.      Nose: Nose normal.      Mouth/Throat:      Mouth: Mucous membranes are moist.      Pharynx: Oropharynx is clear. No oropharyngeal exudate or posterior oropharyngeal erythema.   Eyes:      General: No scleral icterus.     Extraocular Movements: Extraocular movements intact.      Conjunctiva/sclera: Conjunctivae normal.      Pupils: Pupils are equal, round, and reactive to light.   Neck:      Comments: Tracheostomy in place, site clean and dry  Cardiovascular:      Rate and Rhythm: Normal rate and regular rhythm.       Pulses: Normal pulses.      Heart sounds: Normal heart sounds. No murmur heard.     No friction rub. No gallop.   Pulmonary:      Effort: Pulmonary effort is normal. No respiratory distress.      Breath sounds: Normal breath sounds. No stridor. No wheezing, rhonchi or rales.      Comments: On mechanical ventilation via tracheostomy tube, no dyssynchrony seen on mechanical ventilation, no accessory muscle use   Chest:      Chest wall: No tenderness.   Abdominal:      General: Abdomen is flat. Bowel sounds are normal. There is no distension.      Palpations: Abdomen is soft.      Tenderness: There is no abdominal tenderness. There is no rebound.      Comments: J-tube in place site clean and dry   Musculoskeletal:      Cervical back: Normal range of motion. No rigidity or tenderness.   Skin:     General: Skin is warm and dry.      Capillary Refill: Capillary refill takes less than 2 seconds.      Coloration: Skin is not jaundiced.      Findings: No bruising, erythema or rash.   Neurological:      Mental Status: Mental status is at baseline.      Comments: Nonverbal, opens eyes to loud name call, not moving any extremities randomly or on command   Psychiatric:      Comments: Unable to fully assess       Ventilator Settings  Vent Mode: A/C (11/16/24 0340)  Ventilator Initiated: Yes (10/20/24 1546)  Set Rate: 20 BPM (11/16/24 0340)  Vt Set: 500 mL (11/16/24 0340)  PEEP/CPAP: 5 cmH20 (11/16/24 0340)  Oxygen Concentration (%): 30 (11/16/24 0400)  Peak Airway Pressure: 15 cmH20 (11/16/24 0340)  Plateau Pressure: 3 cmH20 (11/01/24 0338)  Total Ve: 11.8 L/m (11/16/24 0340)  F/VT Ratio<105 (RSBI): (!) 79.58 (11/16/24 0340)      Laboratory Studies:   Recent Labs   Lab 11/15/24  0543   PH 7.470*   PCO2 47.0*   PO2 97.0   HCO3 34.2*     Recent Labs   Lab 11/16/24 0314   WBC 8.78   RBC 2.66*   HGB 7.4*   HCT 23.8*   *   MCV 89.5   MCH 27.8   MCHC 31.1*           Recent Labs   Lab 11/16/24 0314   GLUCOSE 111      K  4.0      CO2 26   BUN 18.0   CREATININE 0.56*   CALCIUM 7.5*             Microbiology Data:   Microbiology Results (last 7 days)       Procedure Component Value Units Date/Time    Blood Culture [[id number]]  (Normal) Collected: 11/12/24 1017    Order Status: Completed Specimen: Blood from Hand, Left Updated: 11/15/24 1101     Blood Culture No Growth At 72 Hours    Blood Culture [[id number]]  (Normal) Collected: 11/12/24 1017    Order Status: Completed Specimen: Blood from Hand, Right Updated: 11/15/24 1101     Blood Culture No Growth At 72 Hours    Blood Culture [[id number]]  (Normal) Collected: 11/08/24 0839    Order Status: Completed Specimen: Blood Updated: 11/13/24 1101     Blood Culture No Growth at 5 days    Blood Culture [[id number]]  (Normal) Collected: 11/08/24 0929    Order Status: Completed Specimen: Blood Updated: 11/13/24 1101     Blood Culture No Growth at 5 days    Respiratory Culture [[id number]]  (Abnormal)  (Susceptibility) Collected: 11/01/24 1619    Order Status: Completed Specimen: Respiratory from Sputum, Expectorated Updated: 11/11/24 0618     Respiratory Culture Many ACINETOBACTER BAUMANNII      Many Pseudomonas aeruginosa     GRAM STAIN Quality 2+      Many Gram Negative Rods      Few Yeast              Imaging:   CT Chest Abdomen Pelvis With IV Contrast (XPD) Routine Oral Contrast  Narrative: EXAMINATION:  CT CHEST ABDOMEN PELVIS WITH IV CONTRAST (XPD)    CLINICAL HISTORY:  fever;    TECHNIQUE:  Low dose axial images, sagittal and coronal reformations were obtained from the thoracic inlet to the pubic symphysis following the IV and oral contrast administration.  Automatic exposure control is utilized to reduce patient radiation exposure.    COMPARISON:  10/21/2024 and 08/19/2020    FINDINGS:  There is bilateral upper and lower lobe pneumonia with patchy interstitial and ground-glass infiltrates seen in both lungs.  There are bilateral pleural effusions.  Findings are worse on the  right..    The thoracic aorta is normal in caliber..    Some reactive subcentimeter lymphadenopathy seen the mediastinum.    The heart is normal in size..    ..    The liver appears normal.  No liver mass or lesion is seen.  Portal and hepatic veins appear normal..    The gallbladder appears normal.  No gallstones are seen.    The spleen appears normal.  No splenic mass or lesion is seen.    The pancreas appears grossly unremarkable.  No pancreatic mass or lesion is seen.  No inflammation is seen.    No adrenal abnormality is seen.  No adrenal nodule is seen.    The kidneys are well perfused.  There is a cyst in the lower pole the right kidney.  No hydronephrosis is seen.  No hydroureter is seen.  No retroperitoneal abnormality is seen..    Visualized portions of the bowel shows no acute abnormality.  No colitis is seen.  No diverticulitis is seen.  No colonic mass is seen.    No free air is seen.  No free fluid is seen.    There is persistent urinary bladder wall thickening seen.  Bladder is also decompressed.    There is a sacral decubitus seen which was present on the prior examination as well and appears similar.    There are chronic dysplastic changes seen in the left hip with severe heterotrophic bone formation seen and changes consistent with previous trauma.  This is unchanged since the prior examination.    .  Impression: Diffuse bilateral upper and lower lobe pneumonia and bilateral pleural effusions.  Findings have progressed since the prior examination    Sacral decubitus relatively stable since prior examination    Persistent urinary bladder wall thickening possibly due to decompression versus cystitis    Electronically signed by: Erick Grant  Date:    11/12/2024  Time:    15:49          Assessment and Plan    Assessment:  Sepsis without shock with underlying VRE Enterococcus and ESBL Klebsiella bacteremia with Klebsiella and Proteus pneumonia on 10/20/10/24 (resolved)  -Acinetobacter and Pseudomonas  positive sputum culture on 11/01/2024 with Acinetobacter sensitive to gentamicin and tobramycin and Pseudomonas sensitive to ciprofloxacin gentamicin and tobramycin, suspect colonization without leukocytosis or increased sputum production or worsening hypoxia  Repeat pneumonia without sepsis and/or septic shock (11/08/2024)  -CXR on personal read showed new left sided infiltrate.   -blood cultures NTD  Chronic hypoxemic respiratory failure with tracheostomy on permanent mechanical ventilatory support secondary to prior cerebrovascular accident with subsequent hemorrhagic stroke status post craniectomy   AFib with RVR (rate controlled)  Chronic sacral decubitus ulcer present on admission  -PEG tube feed intolerance status post J-tube extension with tolerance of tube feeds  Altered mental status due to the above intracranial process    Plan:  -titrate mechanical ventilation for ARDS net protocol   -supplement oxygen to maintain saturation 94-96%   -routine tracheostomy care   -tube feeds to goal with free water flushes as appropriate   -continues on digoxin, Lopressor, and amiodarone; remains in afib, nothing further to do for Afib  -continue IV cipro 500 mg bid, ds bactrim bid, metronidazole 500 mg tid, and inhaled tobramycin per ID recs  -wound care for sacral decubitus ulcer debrided at bedside on 11/02 with wound VAC placement on 11/04, appreciate assistance from Wound Care and from General surgery  -family wants to discharge the patient back to nursing home, plan for discharge on Monday when nursing home can get the wound VAC.    DVT ppx/tx with Xarelto  GI ppx with protonix  Keep HOB elevated > 30*      Yuniel Wise MD  11/16/2024  Pulmonology/Critical Care

## 2024-11-17 LAB
ALBUMIN SERPL-MCNC: 1.9 G/DL (ref 3.4–4.8)
ALBUMIN/GLOB SERPL: 0.5 RATIO (ref 1.1–2)
ALP SERPL-CCNC: 68 UNIT/L (ref 40–150)
ALT SERPL-CCNC: 11 UNIT/L (ref 0–55)
ANION GAP SERPL CALC-SCNC: 4 MEQ/L
AST SERPL-CCNC: 16 UNIT/L (ref 5–34)
BACTERIA BLD CULT: NORMAL
BACTERIA BLD CULT: NORMAL
BASOPHILS # BLD AUTO: 0.05 X10(3)/MCL
BASOPHILS NFR BLD AUTO: 0.7 %
BILIRUB SERPL-MCNC: 0.2 MG/DL
BUN SERPL-MCNC: 16.4 MG/DL (ref 8.4–25.7)
CALCIUM SERPL-MCNC: 7.6 MG/DL (ref 8.8–10)
CHLORIDE SERPL-SCNC: 111 MMOL/L (ref 98–107)
CO2 SERPL-SCNC: 28 MMOL/L (ref 23–31)
CREAT SERPL-MCNC: 0.58 MG/DL (ref 0.72–1.25)
CREAT/UREA NIT SERPL: 28
EOSINOPHIL # BLD AUTO: 0.16 X10(3)/MCL (ref 0–0.9)
EOSINOPHIL NFR BLD AUTO: 2.1 %
ERYTHROCYTE [DISTWIDTH] IN BLOOD BY AUTOMATED COUNT: 20.1 % (ref 11.5–17)
GFR SERPLBLD CREATININE-BSD FMLA CKD-EPI: >60 ML/MIN/1.73/M2
GLOBULIN SER-MCNC: 3.7 GM/DL (ref 2.4–3.5)
GLUCOSE SERPL-MCNC: 97 MG/DL (ref 82–115)
HCT VFR BLD AUTO: 26.8 % (ref 42–52)
HGB BLD-MCNC: 8.3 G/DL (ref 14–18)
IMM GRANULOCYTES # BLD AUTO: 0.1 X10(3)/MCL (ref 0–0.04)
IMM GRANULOCYTES NFR BLD AUTO: 1.3 %
LYMPHOCYTES # BLD AUTO: 1.43 X10(3)/MCL (ref 0.6–4.6)
LYMPHOCYTES NFR BLD AUTO: 19.1 %
MCH RBC QN AUTO: 28 PG (ref 27–31)
MCHC RBC AUTO-ENTMCNC: 31 G/DL (ref 33–36)
MCV RBC AUTO: 90.5 FL (ref 80–94)
MONOCYTES # BLD AUTO: 0.69 X10(3)/MCL (ref 0.1–1.3)
MONOCYTES NFR BLD AUTO: 9.2 %
NEUTROPHILS # BLD AUTO: 5.07 X10(3)/MCL (ref 2.1–9.2)
NEUTROPHILS NFR BLD AUTO: 67.6 %
NRBC BLD AUTO-RTO: 0 %
PLATELET # BLD AUTO: 448 X10(3)/MCL (ref 130–400)
PMV BLD AUTO: 9.5 FL (ref 7.4–10.4)
POCT GLUCOSE: 93 MG/DL (ref 70–110)
POTASSIUM SERPL-SCNC: 4.4 MMOL/L (ref 3.5–5.1)
PROT SERPL-MCNC: 5.6 GM/DL (ref 5.8–7.6)
RBC # BLD AUTO: 2.96 X10(6)/MCL (ref 4.7–6.1)
SODIUM SERPL-SCNC: 143 MMOL/L (ref 136–145)
WBC # BLD AUTO: 7.5 X10(3)/MCL (ref 4.5–11.5)

## 2024-11-17 PROCEDURE — 63600175 PHARM REV CODE 636 W HCPCS

## 2024-11-17 PROCEDURE — 25000003 PHARM REV CODE 250

## 2024-11-17 PROCEDURE — 94640 AIRWAY INHALATION TREATMENT: CPT

## 2024-11-17 PROCEDURE — 25000003 PHARM REV CODE 250: Performed by: STUDENT IN AN ORGANIZED HEALTH CARE EDUCATION/TRAINING PROGRAM

## 2024-11-17 PROCEDURE — 99900026 HC AIRWAY MAINTENANCE (STAT)

## 2024-11-17 PROCEDURE — 94668 MNPJ CHEST WALL SBSQ: CPT

## 2024-11-17 PROCEDURE — 85025 COMPLETE CBC W/AUTO DIFF WBC: CPT

## 2024-11-17 PROCEDURE — 27100171 HC OXYGEN HIGH FLOW UP TO 24 HOURS

## 2024-11-17 PROCEDURE — 94760 N-INVAS EAR/PLS OXIMETRY 1: CPT

## 2024-11-17 PROCEDURE — 99900022

## 2024-11-17 PROCEDURE — 94761 N-INVAS EAR/PLS OXIMETRY MLT: CPT

## 2024-11-17 PROCEDURE — 20000000 HC ICU ROOM

## 2024-11-17 PROCEDURE — 25000003 PHARM REV CODE 250: Performed by: HOSPITALIST

## 2024-11-17 PROCEDURE — 25000242 PHARM REV CODE 250 ALT 637 W/ HCPCS: Performed by: INTERNAL MEDICINE

## 2024-11-17 PROCEDURE — 94003 VENT MGMT INPAT SUBQ DAY: CPT

## 2024-11-17 PROCEDURE — 27200966 HC CLOSED SUCTION SYSTEM

## 2024-11-17 PROCEDURE — 99900035 HC TECH TIME PER 15 MIN (STAT)

## 2024-11-17 PROCEDURE — 25000003 PHARM REV CODE 250: Performed by: NURSE PRACTITIONER

## 2024-11-17 PROCEDURE — 25000003 PHARM REV CODE 250: Performed by: GENERAL PRACTICE

## 2024-11-17 PROCEDURE — 27000207 HC ISOLATION

## 2024-11-17 PROCEDURE — 80053 COMPREHEN METABOLIC PANEL: CPT

## 2024-11-17 PROCEDURE — 36415 COLL VENOUS BLD VENIPUNCTURE: CPT

## 2024-11-17 PROCEDURE — 63600175 PHARM REV CODE 636 W HCPCS: Performed by: STUDENT IN AN ORGANIZED HEALTH CARE EDUCATION/TRAINING PROGRAM

## 2024-11-17 PROCEDURE — 99900031 HC PATIENT EDUCATION (STAT)

## 2024-11-17 RX ORDER — BACLOFEN 5 MG/1
5 TABLET ORAL ONCE
Status: COMPLETED | OUTPATIENT
Start: 2024-11-17 | End: 2024-11-17

## 2024-11-17 RX ORDER — PROCHLORPERAZINE EDISYLATE 5 MG/ML
5 INJECTION INTRAMUSCULAR; INTRAVENOUS EVERY 6 HOURS PRN
Status: DISCONTINUED | OUTPATIENT
Start: 2024-11-17 | End: 2024-11-18 | Stop reason: HOSPADM

## 2024-11-17 RX ADMIN — METOPROLOL TARTRATE 25 MG: 25 TABLET, FILM COATED ORAL at 10:11

## 2024-11-17 RX ADMIN — AMIODARONE HYDROCHLORIDE 200 MG: 200 TABLET ORAL at 10:11

## 2024-11-17 RX ADMIN — PROCHLORPERAZINE EDISYLATE 5 MG: 5 INJECTION INTRAMUSCULAR; INTRAVENOUS at 05:11

## 2024-11-17 RX ADMIN — METRONIDAZOLE 500 MG: 500 TABLET ORAL at 10:11

## 2024-11-17 RX ADMIN — PANTOPRAZOLE SODIUM 40 MG: 40 INJECTION, POWDER, LYOPHILIZED, FOR SOLUTION INTRAVENOUS at 10:11

## 2024-11-17 RX ADMIN — SULFAMETHOXAZOLE AND TRIMETHOPRIM 1 TABLET: 800; 160 TABLET ORAL at 10:11

## 2024-11-17 RX ADMIN — METOPROLOL TARTRATE 25 MG: 25 TABLET, FILM COATED ORAL at 07:11

## 2024-11-17 RX ADMIN — ENOXAPARIN SODIUM 70 MG: 80 INJECTION SUBCUTANEOUS at 05:11

## 2024-11-17 RX ADMIN — SULFAMETHOXAZOLE AND TRIMETHOPRIM 1 TABLET: 800; 160 TABLET ORAL at 08:11

## 2024-11-17 RX ADMIN — Medication 4 ML: at 04:11

## 2024-11-17 RX ADMIN — CIPROFOLXACIN 750 MG: 250 TABLET ORAL at 10:11

## 2024-11-17 RX ADMIN — CIPROFOLXACIN 750 MG: 250 TABLET ORAL at 08:11

## 2024-11-17 RX ADMIN — METOCLOPRAMIDE HYDROCHLORIDE 10 MG: 5 SOLUTION ORAL at 07:11

## 2024-11-17 RX ADMIN — QUETIAPINE FUMARATE 50 MG: 25 TABLET ORAL at 08:11

## 2024-11-17 RX ADMIN — PROCHLORPERAZINE EDISYLATE 5 MG: 5 INJECTION INTRAMUSCULAR; INTRAVENOUS at 04:11

## 2024-11-17 RX ADMIN — METRONIDAZOLE 500 MG: 500 TABLET ORAL at 03:11

## 2024-11-17 RX ADMIN — BACLOFEN 5 MG: 5 TABLET ORAL at 10:11

## 2024-11-17 RX ADMIN — ENOXAPARIN SODIUM 70 MG: 80 INJECTION SUBCUTANEOUS at 04:11

## 2024-11-17 RX ADMIN — QUETIAPINE FUMARATE 50 MG: 25 TABLET ORAL at 10:11

## 2024-11-17 RX ADMIN — METRONIDAZOLE 500 MG: 500 TABLET ORAL at 05:11

## 2024-11-17 NOTE — PROGRESS NOTES
Pulmonary & Critical Care Medicine   Progress Note      Presenting History/HPI:    Patient is a 68 y/o male with extensive PMH who presented from NH on 10/20/24 with decreased responsiveness, severe sepsis, and recurrent UTI. 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     Patient transferred to ED from [facility] with lethargy and tachycardia. Family at bedside reports patient is nonverbal at baseline but typically more alert. In the ED, patient is febrile, tachycardic with -190s, tachypneic, /60. EKG shows Afib with RVR. Diltiazem drip started in ED. Labs are significant for WBC of 16.5, lactic acid of 3.3, Cr 1.48, BUN 45.3, Na 155, K+ 5.1, troponin elevated at 0.118. UA with evidence of UTI. Of note, pt was being treated outpatient for a UTI, currently on day 4 of 7 day Rocephin course. Urine culture 10/16/24 with multidrug resistant Klebsiella pneumonia and Proteus mirabilis with susceptibility to Cefepime. Patient received 3L of NS and initiated on Vanc and Cefepime in ED. Admitted to the ICU on mechanical ventilation via trach.    Admitted to the ICU on 10/20   Peg tube extension to J-tube on 10/30    Interval History:  No events overnight.  Hemoglobin has increased to 8.3.  He is having hiccups which is causing ventilator dyssynchrony.    Scheduled Medications:    amiodarone  200 mg Per G Tube Daily    ciprofloxacin HCl  750 mg Oral Q12H    enoxparin  1 mg/kg Subcutaneous Q12H (treatment, non-standard time)    metoprolol tartrate  25 mg Per G Tube BID    metroNIDAZOLE  500 mg Oral Q8H    pantoprazole  40 mg Intravenous Daily    QUEtiapine  50 mg Per G Tube BID    sodium chloride 3%  4 mL  Nebulization Q8H    sulfamethoxazole-trimethoprim 800-160mg  1 tablet Oral BID       PRN Medications:     Current Facility-Administered Medications:     0.9%  NaCl infusion (for blood administration), , Intravenous, Q24H PRN    acetaminophen, 650 mg, Per G Tube, Q6H PRN    dextromethorphan-guaiFENesin  mg/5 ml, 10 mL, Per G Tube, Q4H PRN    dextrose 10%, 12.5 g, Intravenous, PRN    dextrose 10%, 25 g, Intravenous, PRN    diatrizoate meglumineand-diatrizoate sodium, 30 mL, Oral, ONCE PRN    glucagon (human recombinant), 1 mg, Intramuscular, PRN    hydrALAZINE, 10 mg, Intravenous, Q4H PRN    metoclopramide HCl, 10 mg, Oral, Q8H PRN    naloxone, 0.02 mg, Intravenous, PRN    prochlorperazine, 5 mg, Intravenous, Q6H PRN    sodium chloride 0.9%, 10 mL, Intravenous, Q12H PRN      Infusions:          Fluid Balance:     Intake/Output Summary (Last 24 hours) at 11/17/2024 0610  Last data filed at 11/17/2024 0200  Gross per 24 hour   Intake 50 ml   Output 2400 ml   Net -2350 ml         Vital Signs:   Vitals:    11/17/24 0600   BP: 100/68   Pulse: 82   Resp: (!) 26   Temp:          Physical Exam  Vitals reviewed.   Constitutional:       Comments: Chronically ill-appearing   Cardiovascular:      Rate and Rhythm: Normal rate and regular rhythm.   Pulmonary:      Comments: Dyssynchronous with the ventilator due to hiccups  Abdominal:      General: Abdomen is flat.      Palpations: Abdomen is soft.   Musculoskeletal:      Comments: Contractured lower extremities   Neurological:      Comments: Awake but does not follow commands       Ventilator Settings  Vent Mode: A/C (11/17/24 0548)  Ventilator Initiated: Yes (10/20/24 1546)  Set Rate: 20 BPM (11/17/24 0548)  Vt Set: 500 mL (11/17/24 0548)  PEEP/CPAP: 5 cmH20 (11/17/24 0548)  Oxygen Concentration (%): 30 (11/17/24 0600)  Peak Airway Pressure: 21 cmH20 (11/17/24 0548)  Plateau Pressure: 3 cmH20 (11/01/24 0338)  Total Ve: 6.8 L/m (11/17/24 0548)  F/VT Ratio<105 (RSBI): (!)  "38.46 (11/17/24 0548)      Laboratory Studies:   No results for input(s): "PH", "PCO2", "PO2", "HCO3", "POCSATURATED", "BE" in the last 24 hours.    Recent Labs   Lab 11/17/24  0336   WBC 7.50   RBC 2.96*   HGB 8.3*   HCT 26.8*   *   MCV 90.5   MCH 28.0   MCHC 31.0*           Recent Labs   Lab 11/17/24  0336   GLUCOSE 97      K 4.4   *   CO2 28   BUN 16.4   CREATININE 0.58*   CALCIUM 7.6*             Microbiology Data:   Microbiology Results (last 7 days)       Procedure Component Value Units Date/Time    Blood Culture [[id number]]  (Normal) Collected: 11/12/24 1017    Order Status: Completed Specimen: Blood from Hand, Left Updated: 11/16/24 1101     Blood Culture No Growth At 96 Hours    Blood Culture [[id number]]  (Normal) Collected: 11/12/24 1017    Order Status: Completed Specimen: Blood from Hand, Right Updated: 11/16/24 1101     Blood Culture No Growth At 96 Hours    Blood Culture [[id number]]  (Normal) Collected: 11/08/24 0839    Order Status: Completed Specimen: Blood Updated: 11/13/24 1101     Blood Culture No Growth at 5 days    Blood Culture [[id number]]  (Normal) Collected: 11/08/24 0929    Order Status: Completed Specimen: Blood Updated: 11/13/24 1101     Blood Culture No Growth at 5 days    Respiratory Culture [[id number]]  (Abnormal)  (Susceptibility) Collected: 11/01/24 1619    Order Status: Completed Specimen: Respiratory from Sputum, Expectorated Updated: 11/11/24 0618     Respiratory Culture Many ACINETOBACTER BAUMANNII      Many Pseudomonas aeruginosa     GRAM STAIN Quality 2+      Many Gram Negative Rods      Few Yeast              Imaging:   CT Chest Abdomen Pelvis With IV Contrast (XPD) Routine Oral Contrast  Narrative: EXAMINATION:  CT CHEST ABDOMEN PELVIS WITH IV CONTRAST (XPD)    CLINICAL HISTORY:  fever;    TECHNIQUE:  Low dose axial images, sagittal and coronal reformations were obtained from the thoracic inlet to the pubic symphysis following the IV and oral contrast " administration.  Automatic exposure control is utilized to reduce patient radiation exposure.    COMPARISON:  10/21/2024 and 08/19/2020    FINDINGS:  There is bilateral upper and lower lobe pneumonia with patchy interstitial and ground-glass infiltrates seen in both lungs.  There are bilateral pleural effusions.  Findings are worse on the right..    The thoracic aorta is normal in caliber..    Some reactive subcentimeter lymphadenopathy seen the mediastinum.    The heart is normal in size..    ..    The liver appears normal.  No liver mass or lesion is seen.  Portal and hepatic veins appear normal..    The gallbladder appears normal.  No gallstones are seen.    The spleen appears normal.  No splenic mass or lesion is seen.    The pancreas appears grossly unremarkable.  No pancreatic mass or lesion is seen.  No inflammation is seen.    No adrenal abnormality is seen.  No adrenal nodule is seen.    The kidneys are well perfused.  There is a cyst in the lower pole the right kidney.  No hydronephrosis is seen.  No hydroureter is seen.  No retroperitoneal abnormality is seen..    Visualized portions of the bowel shows no acute abnormality.  No colitis is seen.  No diverticulitis is seen.  No colonic mass is seen.    No free air is seen.  No free fluid is seen.    There is persistent urinary bladder wall thickening seen.  Bladder is also decompressed.    There is a sacral decubitus seen which was present on the prior examination as well and appears similar.    There are chronic dysplastic changes seen in the left hip with severe heterotrophic bone formation seen and changes consistent with previous trauma.  This is unchanged since the prior examination.    .  Impression: Diffuse bilateral upper and lower lobe pneumonia and bilateral pleural effusions.  Findings have progressed since the prior examination    Sacral decubitus relatively stable since prior examination    Persistent urinary bladder wall thickening possibly  due to decompression versus cystitis    Electronically signed by: Erick Grant  Date:    11/12/2024  Time:    15:49          Assessment and Plan    Assessment:  Sepsis without shock with underlying VRE Enterococcus and ESBL Klebsiella bacteremia with Klebsiella and Proteus pneumonia on 10/20/10/24 (resolved)  -Acinetobacter and Pseudomonas positive sputum culture on 11/01/2024 with Acinetobacter sensitive to gentamicin and tobramycin and Pseudomonas sensitive to ciprofloxacin gentamicin and tobramycin, suspect colonization without leukocytosis or increased sputum production or worsening hypoxia  Repeat pneumonia without sepsis and/or septic shock (11/08/2024)  -CXR on personal read showed new left sided infiltrate.   -blood cultures NTD  Chronic hypoxemic respiratory failure with tracheostomy on permanent mechanical ventilatory support secondary to prior cerebrovascular accident with subsequent hemorrhagic stroke status post craniectomy   AFib with RVR (rate controlled)  Chronic sacral decubitus ulcer present on admission  -PEG tube feed intolerance status post J-tube extension with tolerance of tube feeds  Altered mental status due to the above intracranial process    Plan:   Continue mechanical ventilation on his chronic settings of volume control with Vt 500, RR 20, PEEP 5, FiO2 30%.  Continue antibiotics per Infectious Disease: Ciprofloxacin, Bactrim, Flagyl, and inhaled tobramycin through 11/21  Continue therapeutic Lovenox and transition to Eliquis at discharge.  Leave midline in place.  Trial of baclofen for hiccups  Trend hemoglobin daily. Iron studies are consistent with anemia of chronic disease - supplemental iron unlikely to be beneficial  Appreciate wound care recommendations  Appreciate palliative care recommendations  Appreciate case management recommendations regarding arrangement of wound VAC for discharge    DVT ppx/tx with lovenox  GI ppx with protonix  Keep HOB elevated > 30*      Patrick Shepard  MD  Pulmonary and Critical Care

## 2024-11-18 VITALS
WEIGHT: 152.31 LBS | SYSTOLIC BLOOD PRESSURE: 108 MMHG | RESPIRATION RATE: 27 BRPM | BODY MASS INDEX: 22.56 KG/M2 | OXYGEN SATURATION: 98 % | TEMPERATURE: 99 F | DIASTOLIC BLOOD PRESSURE: 82 MMHG | HEART RATE: 98 BPM | HEIGHT: 69 IN

## 2024-11-18 LAB
ALBUMIN SERPL-MCNC: 2 G/DL (ref 3.4–4.8)
ALBUMIN/GLOB SERPL: 0.5 RATIO (ref 1.1–2)
ALLENS TEST BLOOD GAS (OHS): YES
ALP SERPL-CCNC: 68 UNIT/L (ref 40–150)
ALT SERPL-CCNC: 10 UNIT/L (ref 0–55)
ANION GAP SERPL CALC-SCNC: 6 MEQ/L
AST SERPL-CCNC: 15 UNIT/L (ref 5–34)
BASE EXCESS BLD CALC-SCNC: 7.1 MMOL/L (ref -2–2)
BASOPHILS # BLD AUTO: 0.06 X10(3)/MCL
BASOPHILS NFR BLD AUTO: 0.4 %
BILIRUB SERPL-MCNC: 0.3 MG/DL
BLOOD GAS SAMPLE TYPE (OHS): ABNORMAL
BUN SERPL-MCNC: 17.5 MG/DL (ref 8.4–25.7)
CA-I BLD-SCNC: 1.15 MMOL/L (ref 1.12–1.23)
CALCIUM SERPL-MCNC: 7.7 MG/DL (ref 8.8–10)
CHLORIDE SERPL-SCNC: 111 MMOL/L (ref 98–107)
CO2 BLDA-SCNC: 32.6 MMOL/L
CO2 SERPL-SCNC: 26 MMOL/L (ref 23–31)
COHGB MFR BLDA: 2.1 % (ref 0.5–1.5)
CREAT SERPL-MCNC: 0.58 MG/DL (ref 0.72–1.25)
CREAT/UREA NIT SERPL: 30
DRAWN BY BLOOD GAS (OHS): ABNORMAL
EOSINOPHIL # BLD AUTO: 0.12 X10(3)/MCL (ref 0–0.9)
EOSINOPHIL NFR BLD AUTO: 0.7 %
ERYTHROCYTE [DISTWIDTH] IN BLOOD BY AUTOMATED COUNT: 20.4 % (ref 11.5–17)
GFR SERPLBLD CREATININE-BSD FMLA CKD-EPI: >60 ML/MIN/1.73/M2
GLOBULIN SER-MCNC: 3.7 GM/DL (ref 2.4–3.5)
GLUCOSE SERPL-MCNC: 106 MG/DL (ref 82–115)
HCO3 BLDA-SCNC: 31.3 MMOL/L (ref 22–26)
HCT VFR BLD AUTO: 26.3 % (ref 42–52)
HGB BLD-MCNC: 8.1 G/DL (ref 14–18)
IMM GRANULOCYTES # BLD AUTO: 0.12 X10(3)/MCL (ref 0–0.04)
IMM GRANULOCYTES NFR BLD AUTO: 0.7 %
INHALED O2 CONCENTRATION: 30 %
LYMPHOCYTES # BLD AUTO: 1.73 X10(3)/MCL (ref 0.6–4.6)
LYMPHOCYTES NFR BLD AUTO: 10.7 %
MCH RBC QN AUTO: 27.5 PG (ref 27–31)
MCHC RBC AUTO-ENTMCNC: 30.8 G/DL (ref 33–36)
MCV RBC AUTO: 89.2 FL (ref 80–94)
MECH RR (OHS): 20 B/MIN
METHGB MFR BLDA: 0.9 % (ref 0.4–1.5)
MODE (OHS): AC
MONOCYTES # BLD AUTO: 0.8 X10(3)/MCL (ref 0.1–1.3)
MONOCYTES NFR BLD AUTO: 4.9 %
NEUTROPHILS # BLD AUTO: 13.38 X10(3)/MCL (ref 2.1–9.2)
NEUTROPHILS NFR BLD AUTO: 82.6 %
NRBC BLD AUTO-RTO: 0 %
O2 HB BLOOD GAS (OHS): 96.3 % (ref 94–97)
OXYGEN DEVICE BLOOD GAS (OHS): ABNORMAL
OXYHGB MFR BLDA: 8.2 G/DL (ref 12–16)
PCO2 BLDA: 42 MMHG (ref 35–45)
PEEP RESPIRATORY: 5 CMH2O
PH BLDA: 7.48 [PH] (ref 7.35–7.45)
PLATELET # BLD AUTO: 517 X10(3)/MCL (ref 130–400)
PMV BLD AUTO: 9.6 FL (ref 7.4–10.4)
PO2 BLDA: 96 MMHG (ref 80–100)
POCT GLUCOSE: 105 MG/DL (ref 70–110)
POCT GLUCOSE: 112 MG/DL (ref 70–110)
POCT GLUCOSE: 98 MG/DL (ref 70–110)
POTASSIUM BLOOD GAS (OHS): 3.7 MMOL/L (ref 3.5–5)
POTASSIUM SERPL-SCNC: 4.2 MMOL/L (ref 3.5–5.1)
PROT SERPL-MCNC: 5.7 GM/DL (ref 5.8–7.6)
RBC # BLD AUTO: 2.95 X10(6)/MCL (ref 4.7–6.1)
SAMPLE SITE BLOOD GAS (OHS): ABNORMAL
SAO2 % BLDA: 97.9 %
SODIUM BLOOD GAS (OHS): 137 MMOL/L (ref 137–145)
SODIUM SERPL-SCNC: 143 MMOL/L (ref 136–145)
SPONT+MECH VT ON VENT: 500 ML
WBC # BLD AUTO: 16.21 X10(3)/MCL (ref 4.5–11.5)

## 2024-11-18 PROCEDURE — 25000003 PHARM REV CODE 250

## 2024-11-18 PROCEDURE — 99900026 HC AIRWAY MAINTENANCE (STAT)

## 2024-11-18 PROCEDURE — 25000003 PHARM REV CODE 250: Performed by: HOSPITALIST

## 2024-11-18 PROCEDURE — 94003 VENT MGMT INPAT SUBQ DAY: CPT

## 2024-11-18 PROCEDURE — 99233 SBSQ HOSP IP/OBS HIGH 50: CPT | Mod: ,,,

## 2024-11-18 PROCEDURE — 25000242 PHARM REV CODE 250 ALT 637 W/ HCPCS: Performed by: INTERNAL MEDICINE

## 2024-11-18 PROCEDURE — 99900035 HC TECH TIME PER 15 MIN (STAT)

## 2024-11-18 PROCEDURE — 94640 AIRWAY INHALATION TREATMENT: CPT

## 2024-11-18 PROCEDURE — 25000003 PHARM REV CODE 250: Performed by: GENERAL PRACTICE

## 2024-11-18 PROCEDURE — 27100171 HC OXYGEN HIGH FLOW UP TO 24 HOURS

## 2024-11-18 PROCEDURE — 36415 COLL VENOUS BLD VENIPUNCTURE: CPT

## 2024-11-18 PROCEDURE — 94760 N-INVAS EAR/PLS OXIMETRY 1: CPT

## 2024-11-18 PROCEDURE — 80053 COMPREHEN METABOLIC PANEL: CPT

## 2024-11-18 PROCEDURE — 85025 COMPLETE CBC W/AUTO DIFF WBC: CPT

## 2024-11-18 PROCEDURE — 27200966 HC CLOSED SUCTION SYSTEM

## 2024-11-18 PROCEDURE — 94761 N-INVAS EAR/PLS OXIMETRY MLT: CPT | Mod: XB

## 2024-11-18 PROCEDURE — 63600175 PHARM REV CODE 636 W HCPCS: Performed by: STUDENT IN AN ORGANIZED HEALTH CARE EDUCATION/TRAINING PROGRAM

## 2024-11-18 PROCEDURE — 99900031 HC PATIENT EDUCATION (STAT)

## 2024-11-18 PROCEDURE — 63600175 PHARM REV CODE 636 W HCPCS

## 2024-11-18 PROCEDURE — 36600 WITHDRAWAL OF ARTERIAL BLOOD: CPT

## 2024-11-18 PROCEDURE — 25000003 PHARM REV CODE 250: Performed by: STUDENT IN AN ORGANIZED HEALTH CARE EDUCATION/TRAINING PROGRAM

## 2024-11-18 PROCEDURE — 82803 BLOOD GASES ANY COMBINATION: CPT

## 2024-11-18 PROCEDURE — 25000003 PHARM REV CODE 250: Performed by: NURSE PRACTITIONER

## 2024-11-18 RX ORDER — METOPROLOL TARTRATE 25 MG/1
25 TABLET, FILM COATED ORAL 2 TIMES DAILY
Start: 2024-11-18 | End: 2025-11-18

## 2024-11-18 RX ORDER — METRONIDAZOLE 500 MG/1
500 TABLET ORAL EVERY 8 HOURS
Start: 2024-11-18 | End: 2024-11-21

## 2024-11-18 RX ORDER — CIPROFLOXACIN 750 MG/1
750 TABLET, FILM COATED ORAL EVERY 12 HOURS
Start: 2024-11-18 | End: 2024-11-21

## 2024-11-18 RX ORDER — AMIODARONE HYDROCHLORIDE 200 MG/1
200 TABLET ORAL DAILY
Start: 2024-11-19 | End: 2025-11-19

## 2024-11-18 RX ORDER — CIPROFLOXACIN 750 MG/1
750 TABLET, FILM COATED ORAL EVERY 12 HOURS
Start: 2024-11-18 | End: 2024-11-18

## 2024-11-18 RX ORDER — METRONIDAZOLE 500 MG/1
500 TABLET ORAL EVERY 8 HOURS
Start: 2024-11-18 | End: 2024-11-18

## 2024-11-18 RX ORDER — FINASTERIDE 5 MG/1
5 TABLET, FILM COATED ORAL DAILY
Start: 2024-11-18 | End: 2025-11-18

## 2024-11-18 RX ORDER — SULFAMETHOXAZOLE AND TRIMETHOPRIM 800; 160 MG/1; MG/1
1 TABLET ORAL 2 TIMES DAILY
Start: 2024-11-18 | End: 2024-11-18

## 2024-11-18 RX ORDER — POLYETHYLENE GLYCOL 3350 17 G/17G
17 POWDER, FOR SOLUTION ORAL 2 TIMES DAILY PRN
Start: 2024-11-18

## 2024-11-18 RX ORDER — SULFAMETHOXAZOLE AND TRIMETHOPRIM 800; 160 MG/1; MG/1
1 TABLET ORAL 2 TIMES DAILY
Start: 2024-11-18 | End: 2024-11-21

## 2024-11-18 RX ADMIN — AMIODARONE HYDROCHLORIDE 200 MG: 200 TABLET ORAL at 08:11

## 2024-11-18 RX ADMIN — QUETIAPINE FUMARATE 50 MG: 25 TABLET ORAL at 08:11

## 2024-11-18 RX ADMIN — CIPROFOLXACIN 750 MG: 250 TABLET ORAL at 09:11

## 2024-11-18 RX ADMIN — PANTOPRAZOLE SODIUM 40 MG: 40 INJECTION, POWDER, LYOPHILIZED, FOR SOLUTION INTRAVENOUS at 08:11

## 2024-11-18 RX ADMIN — METRONIDAZOLE 500 MG: 500 TABLET ORAL at 02:11

## 2024-11-18 RX ADMIN — APIXABAN 10 MG: 5 TABLET, FILM COATED ORAL at 06:11

## 2024-11-18 RX ADMIN — METOPROLOL TARTRATE 25 MG: 25 TABLET, FILM COATED ORAL at 08:11

## 2024-11-18 RX ADMIN — Medication 4 ML: at 12:11

## 2024-11-18 RX ADMIN — METRONIDAZOLE 500 MG: 500 TABLET ORAL at 05:11

## 2024-11-18 RX ADMIN — SULFAMETHOXAZOLE AND TRIMETHOPRIM 1 TABLET: 800; 160 TABLET ORAL at 08:11

## 2024-11-18 RX ADMIN — Medication 4 ML: at 08:11

## 2024-11-18 RX ADMIN — ENOXAPARIN SODIUM 70 MG: 80 INJECTION SUBCUTANEOUS at 05:11

## 2024-11-18 NOTE — DISCHARGE SUMMARY
Discharge Summary  Critical Care Medicine      Admit Date: 10/20/2024    Discharge Date and Time:11/18/2024      Discharge Attending Physician: Jamil Hutchins Jr., MD,*     Diagnoses:  Active Hospital Problems    Diagnosis  POA    *UTI (urinary tract infection) [N39.0]  Yes    Pressure ulcer of sacral region, stage 4 [L89.154]  Yes    Unstageable pressure ulcer of sacral region [L89.150]  Yes     Chronic    Pressure injury of left knee, stage 3 [L89.893]  Yes     Chronic    Pressure ulcer of left ankle, stage 4 [L89.524]  Yes     Chronic    Leukocytosis [D72.829]  Yes      Resolved Hospital Problems   No resolved problems to display.       Discharged Condition: stable    Hospital Course: Patient is a 70 y/o male with extensive PMH who presented from NH on 10/20/24 with decreased responsiveness, severe sepsis, and recurrent UTI. 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      Patient transferred to ED from [facility] with lethargy and tachycardia. Family at bedside reports patient is nonverbal at baseline but typically more alert. In the ED, patient is febrile, tachycardic with -190s, tachypneic, /60. EKG shows Afib with RVR. Diltiazem drip started in ED. Labs are significant for WBC of 16.5, lactic acid of 3.3, Cr 1.48, BUN 45.3, Na 155, K+ 5.1, troponin elevated at 0.118. UA with evidence of UTI. Of note, pt was being treated outpatient for a UTI, currently on day 4 of 7 day Rocephin course. Urine culture 10/16/24 with multidrug resistant Klebsiella pneumonia and Proteus mirabilis with susceptibility to Cefepime. Patient received 3L of NS and initiated on Vanc and Cefepime in ED. Admitted to the ICU  on mechanical ventilation via trach.  Patient was admitted to ICU on 10/20/2024.  And went PEG tube extension to J-tube on 10/30/2024.      Patient initially completed course of meropenem and linezolid although began having worsening leukocytosis.  Respiratory culture grew Acinetobacter baumannii and Pseudomonas.  Infectious Disease was consulted, patient was initiated on ciprofloxacin, metronidazole, Bactrim with plans to complete antibiotic course on 11/21/2024.  Throughout hospital course patient was also noted to have AFib with RVR and cardiology was consulted.  Patient was adequate lead controlled with amiodarone 200 mg daily, metoprolol 25 mg b.i.d..  Patient transition to Eliquis with loading dose for 7 days followed by Eliquis 5 mg b.i.d..        Consults: Cardiology, Gastroenterology, and Infectious Disease    Significant Diagnostic Studies:  Respiratory culture Klebsiella pneumonia, Proteus mirabilis.  Blood culture Klebsiella pneumonia, Enterococcus faecium.  CT chest abdomen pelvis:  Diffuse bilateral upper and lower lobe pneumonia bilateral pleural effusions.    Special Treatments/Procedures:  PEG tube extension to J-tube    Disposition: Another Health Care Institution Not Defined    Patient Instructions:   Current Discharge Medication List        START taking these medications    Details   amiodarone (PACERONE) 200 MG Tab 1 tablet (200 mg total) by Per G Tube route once daily.      !! apixaban (ELIQUIS) 5 mg Tab Take 2 tablets (10 mg total) by mouth 2 (two) times daily. for 7 days      !! apixaban (ELIQUIS) 5 mg Tab Take 1 tablet (5 mg total) by mouth 2 (two) times daily. Take 1 tablet (5 mg total) by mouth 2 times daily starting on 11/26/2024.      ciprofloxacin HCl (CIPRO) 750 MG tablet Take 1 tablet (750 mg total) by mouth every 12 (twelve) hours. for 3 days      metroNIDAZOLE (FLAGYL) 500 MG tablet Take 1 tablet (500 mg total) by mouth every 8 (eight) hours. for 3 days       sulfamethoxazole-trimethoprim 800-160mg (BACTRIM DS) 800-160 mg Tab Take 1 tablet by mouth 2 (two) times daily. for 3 days       !! - Potential duplicate medications found. Please discuss with provider.        CONTINUE these medications which have CHANGED    Details   metoprolol tartrate (LOPRESSOR) 25 MG tablet 1 tablet (25 mg total) by Per G Tube route 2 (two) times daily.    Comments: .           CONTINUE these medications which have NOT CHANGED    Details   ascorbic Acid (VITAMIN C) 500 mg CpSR 500 mg 2 (two) times daily. Per PEG tube      busPIRone (BUSPAR) 5 MG Tab 5 mg by Per G Tube route 3 (three) times daily.      cholestyramine (QUESTRAN) 4 gram packet 4 g once daily. Via PEG tube      cholestyramine-aspartame (QUESTRAN LIGHT) 4 gram PwPk 1 packet (4 g total) by Per G Tube route 2 (two) times daily.  Qty: 180 packet, Refills: 3      finasteride (PROSCAR) 5 mg tablet Take 1 tablet (5 mg total) by mouth once daily.  Qty: 30 tablet, Refills: 11      furosemide (LASIX) 80 MG tablet 80 mg by Per G Tube route as needed (for Edema).      L. acidophilus/L.bulgaricus (FLORANEX ORAL) 1 packet by PEG Tube route Daily.      levetiracetam 500 mg/5 mL (5 mL) Soln 1,500 mg by Per G Tube route 2 (two) times daily. Nursing home reports medication hold by MD until level redraw on Monday.      LIPITOR 10 mg tablet 10 mg by Per G Tube route once daily.      miconazole NITRATE 2 % (MICOTIN) 2 % top powder Apply topically 2 (two) times daily.      multivitamin (THERAGRAN) per tablet 1 tablet by Per G Tube route once daily.      pantoprazole (PROTONIX) 40 mg injection Inject 40 mg into the vein 2 (two) times daily.      polyethylene glycol (GLYCOLAX) 17 gram PwPk Take 17 g by mouth 2 (two) times daily as needed for Constipation.      protein supplement (PROMOD PROTEIN ORAL) 30 mLs by Per G Tube route once daily.      QUEtiapine (SEROQUEL) 25 MG Tab 25 mg by Per G Tube route 2 (two) times daily.      scopolamine  (TRANSDERM-SCOP) 1.3-1.5 mg (1 mg over 3 days) Place 1 patch onto the skin Every 3 (three) days.      sucralfate (CARAFATE) 1 gram tablet 1 tablet (1 g total) by Per G Tube route 4 (four) times daily before meals and nightly.      tamsulosin (FLOMAX) 0.4 mg Cap Take 1 capsule (0.4 mg total) by mouth every evening.  Qty: 30 capsule, Refills: 11      venlafaxine 75 mg TR24 1 tablet by Per G Tube route 2 (two) times a day.           STOP taking these medications       diltiaZEM (CARDIZEM) 60 MG tablet Comments:   Reason for Stopping:         ferrous sulfate 300 mg (60 mg iron)/5 mL syrup Comments:   Reason for Stopping:         metoclopramide HCl (REGLAN) 5 mg/5 mL Soln Comments:   Reason for Stopping:         XARELTO 20 mg Tab Comments:   Reason for Stopping:               No discharge procedures on file.

## 2024-11-18 NOTE — SUBJECTIVE & OBJECTIVE
Subjective:     HPI:  Wound medicine re-check    The patient is a 69 year old male who presented to [facility] ED on 10/20/24 from [facility] with decreased responsiveness, sepsis, and recurrent UTI. Noted to be hypotensive with Afib and RVR, also requiring mechanical ventilation and admitted to the ICU.   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 Patient is non-verbal at baseline, contracted upper and lower extremities on left side. 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   Blood cultures on admission + Enterococcus faecium - VRE per [facility]. Urine culture with Klebsiella and Proteus. ID guiding antibiotic stewardship.     Patient admitted with unstageable pressure ulcer of sacrum; seen by inpatient wound nurse and treatment was iniatated, wound medicine consulted for evaluation and possible debridement.   No family present at time of assessment, all information gained through chart review. In June 2024 appears that patient was seen by wound  for sacral pressure ulcer, no visit notes or images availabe from this time frame. First image of sacral region in May 2024 with wound of sacrum/coccyx. In July 2024 images appears that wound had healed with remaining dark discoloration of sacrum/buttocks and then again noted with wound in images of late August 2024; appears to have evolved and worsened up to this point.  Initial evaluation of wounds for this encounter done on 10/22/24 - several pressure ulcers with most concerning is the sacral/coccyx unstageable ulcer. Also with fecal incontinence which is contaminating the wound as it is very close to the anus. He is contracted and we were unable to get full visualization and positioning for bedside debridement. Recommendations made for palliative care  consultation  to discuss overall goals of care.   Palliative care consulted and discussion held with family in which they have decided to continue with treatment.Palliative met with family again on 11/11 with wishes to continue with aggressive supportive measures with full code status.   Bedside debridement of sacral ulcer with Surgery on 11/2/24.   Wound Vac applied on 11/4/24  ID continues to follow,  guiding antibiotic stewardship. BC from 11/8 normal; BC from 11/12 preliminary no growth at 48 hours.   11/18/24 - wound re-check today. Met patient in room IN02, he is awake, non-verbal with trach on mechanical ventilation. He is on ICU low air loss bed with bilateral heel boots in place.  Accompanied by ICU nurse as well as inpatient wound nurse.  Difficult to reposition because of contractures. Febrile again today with noted increase of WBC from 7.5 on 11/17 to 16.01 on 11/18. No acute distress. Tolerates repositioning for wound care.              Hospital Course:   No notes on file      Follow-up For: Procedure(s) (LRB):  EGD w/ Jtube placement (N/A)    Post-Operative Day: 19 Days Post-Op    Scheduled Meds:   amiodarone  200 mg Per G Tube Daily    apixaban  10 mg Oral BID    ciprofloxacin HCl  750 mg Oral Q12H    metoprolol tartrate  25 mg Per G Tube BID    metroNIDAZOLE  500 mg Oral Q8H    pantoprazole  40 mg Intravenous Daily    QUEtiapine  50 mg Per G Tube BID    sodium chloride 3%  4 mL Nebulization Q8H    sulfamethoxazole-trimethoprim 800-160mg  1 tablet Oral BID     Continuous Infusions:  PRN Meds:  Current Facility-Administered Medications:     0.9%  NaCl infusion (for blood administration), , Intravenous, Q24H PRN    acetaminophen, 650 mg, Per G Tube, Q6H PRN    dextromethorphan-guaiFENesin  mg/5 ml, 10 mL, Per G Tube, Q4H PRN    dextrose 10%, 12.5 g, Intravenous, PRN    dextrose 10%, 25 g, Intravenous, PRN    diatrizoate meglumineand-diatrizoate sodium, 30 mL, Oral, ONCE PRN    glucagon (human  recombinant), 1 mg, Intramuscular, PRN    hydrALAZINE, 10 mg, Intravenous, Q4H PRN    metoclopramide HCl, 10 mg, Oral, Q8H PRN    naloxone, 0.02 mg, Intravenous, PRN    prochlorperazine, 5 mg, Intravenous, Q6H PRN    sodium chloride 0.9%, 10 mL, Intravenous, Q12H PRN    Review of Systems   Unable to perform ROS: Patient nonverbal     Objective:     Vital Signs (Most Recent):  Temp: 100.3 °F (37.9 °C) (11/18/24 0800)  Pulse: 88 (11/18/24 1115)  Resp: (!) 21 (11/18/24 1115)  BP: (!) 94/59 (11/18/24 1115)  SpO2: 100 % (11/18/24 1115) Vital Signs (24h Range):  Temp:  [98.4 °F (36.9 °C)-100.3 °F (37.9 °C)] 100.3 °F (37.9 °C)  Pulse:  [] 88  Resp:  [13-39] 21  SpO2:  [78 %-100 %] 100 %  BP: ()/(54-85) 94/59     Weight: 69.1 kg (152 lb 5.4 oz)  Body mass index is 22.49 kg/m².     Physical Exam  Vitals reviewed.   Constitutional:       General: He is awake.      Comments: Rectal tube with watery brown stool; strong odor   Some leakage around tube, wound vac in place       HENT:      Head:      Comments: Right bone flap      Neck:      Comments: Tracheostomy   Pulmonary:      Comments: Mechanical ventilation    Abdominal:      Comments: PEG/J-Tube   Skin:     General: Skin is warm and dry.      Capillary Refill: Capillary refill takes less than 2 seconds.      Findings: Wound present.             Comments:     Left 1st and 2nd toe abrasions    Neurological:      Comments: Left upper and lower extremity contracted  Moves RUE         Sacrum: 6 x 8 x 3.4 cm Undermining from 1 to 5 o'clock - deepest 2.5 cm at 1 o'clock.       Left lateral knee: 0.8 x 1 cm       Left lateral ankle: 0.3 x 0.3 cm                Laboratory:  A1C:   Recent Labs   Lab 08/26/24  1221   HGBA1C 5.3     ABGs:   Recent Labs   Lab 11/18/24  0700   PH 7.480*   PCO2 42.0   HCO3 31.3*     BMP:   Recent Labs   Lab 11/18/24  0339      K 4.2   *   CO2 26   BUN 17.5   CREATININE 0.58*   CALCIUM 7.7*       CBC:   Recent Labs   Lab  11/18/24 0339   WBC 16.21*   RBC 2.95*   HGB 8.1*   HCT 26.3*   *   MCV 89.2   MCH 27.5   MCHC 30.8*     CMP:   Recent Labs   Lab 11/18/24 0339   CALCIUM 7.7*   ALBUMIN 2.0*      K 4.2   CO2 26   *   BUN 17.5   CREATININE 0.58*   ALKPHOS 68   ALT 10   AST 15   BILITOT 0.3       LFTs:   Recent Labs   Lab 11/18/24 0339   ALT 10   AST 15   ALKPHOS 68   BILITOT 0.3   ALBUMIN 2.0*       Microbiology Results (last 7 days)       Procedure Component Value Units Date/Time    Blood Culture [[id number]]  (Normal) Collected: 11/12/24 1017    Order Status: Completed Specimen: Blood from Hand, Left Updated: 11/17/24 1100     Blood Culture No Growth at 5 days    Blood Culture [[id number]]  (Normal) Collected: 11/12/24 1017    Order Status: Completed Specimen: Blood from Hand, Right Updated: 11/17/24 1100     Blood Culture No Growth at 5 days    Blood Culture [[id number]]  (Normal) Collected: 11/08/24 0839    Order Status: Completed Specimen: Blood Updated: 11/13/24 1101     Blood Culture No Growth at 5 days    Blood Culture [[id number]]  (Normal) Collected: 11/08/24 0929    Order Status: Completed Specimen: Blood Updated: 11/13/24 1101     Blood Culture No Growth at 5 days              Diagnostic Results:  I have reviewed all pertinent imaging results/findings within the past 24 hours.

## 2024-11-18 NOTE — PLAN OF CARE
Problem: Skin Injury Risk Increased  Goal: Skin Health and Integrity  Outcome: Progressing     Problem: Adult Inpatient Plan of Care  Goal: Plan of Care Review  Outcome: Progressing  Goal: Patient-Specific Goal (Individualized)  Outcome: Progressing  Goal: Absence of Hospital-Acquired Illness or Injury  Outcome: Progressing  Goal: Optimal Comfort and Wellbeing  Outcome: Progressing  Goal: Readiness for Transition of Care  Outcome: Progressing     Problem: Infection  Goal: Absence of Infection Signs and Symptoms  Outcome: Progressing     Problem: Sepsis/Septic Shock  Goal: Optimal Coping  Outcome: Progressing  Goal: Absence of Bleeding  Outcome: Progressing  Goal: Blood Glucose Level Within Targeted Range  Outcome: Progressing  Goal: Absence of Infection Signs and Symptoms  Outcome: Progressing  Goal: Optimal Nutrition Intake  Outcome: Progressing     Problem: Pneumonia  Goal: Fluid Balance  Outcome: Progressing  Goal: Resolution of Infection Signs and Symptoms  Outcome: Progressing  Goal: Effective Oxygenation and Ventilation  Outcome: Progressing     Problem: Wound  Goal: Optimal Coping  Outcome: Progressing  Goal: Optimal Functional Ability  Outcome: Progressing  Goal: Absence of Infection Signs and Symptoms  Outcome: Progressing  Goal: Optimal Pain Control and Function  Outcome: Progressing  Goal: Skin Health and Integrity  Outcome: Progressing  Goal: Optimal Wound Healing  Outcome: Progressing     Problem: Fall Injury Risk  Goal: Absence of Fall and Fall-Related Injury  Outcome: Progressing     Problem: Coping Ineffective  Goal: Effective Coping  Outcome: Progressing     Problem: Mechanical Ventilation Invasive  Goal: Effective Communication  Outcome: Progressing  Goal: Optimal Device Function  Outcome: Progressing  Goal: Mechanical Ventilation Liberation  Outcome: Progressing  Goal: Optimal Nutrition Delivery  Outcome: Progressing  Goal: Absence of Device-Related Skin and Tissue Injury  Outcome:  Progressing  Goal: Absence of Ventilator-Induced Lung Injury  Outcome: Progressing     Problem: Artificial Airway  Goal: Effective Communication  Outcome: Progressing  Goal: Optimal Device Function  Outcome: Progressing  Goal: Absence of Device-Related Skin or Tissue Injury  Outcome: Progressing

## 2024-11-18 NOTE — PROGRESS NOTES
Inpatient Nutrition Assessment    Admit Date: 10/20/2024   Total duration of encounter: 29 days   Patient Age: 69 y.o.    Nutrition Recommendation/Prescription     Tube feeding recommendation:     Impact Peptide 1.5 goal rate 70 ml/hr to provide  2100 kcal/d  (111% est needs)  131 g protein/d (100% est needs)  1078 ml free water/d (52% est needs)  (calculations based on estimated 20 hr/d run time)     If no IV fluids running, can give 100ml q 2hr water flushes. Total water provided: 2078ml (100% est needs.)     Patel (provides 90 kcal, 2.5 g protein per serving) BID.    Consider per MD:  MVI +Fe 1 po daily  Vit C 500mg BID  Vit A 10,000 IU daily  Zinc sulfate 220mg Daily     Communication of Recommendations: reviewed with nurse    Nutrition Assessment     Malnutrition Assessment/Nutrition-Focused Physical Exam    Malnutrition Context: chronic illness (10/21/24 1028)  Malnutrition Level: severe (10/21/24 1028)  Energy Intake (Malnutrition):  (unable to eval) (10/21/24 1028)  Weight Loss (Malnutrition): greater than 10% in 6 months (10/21/24 1028)  Subcutaneous Fat (Malnutrition): severe depletion (10/21/24 1028)  Orbital Region (Subcutaneous Fat Loss): severe depletion  Upper Arm Region (Subcutaneous Fat Loss): severe depletion     Muscle Mass (Malnutrition): severe depletion (10/21/24 1028)     Clavicle Bone Region (Muscle Loss): severe depletion  Clavicle and Acromion Bone Region (Muscle Loss): severe depletion  Scapular Bone Region (Muscle Loss): severe depletion              Fluid Accumulation (Malnutrition):  (does not meet criteria) (10/21/24 1028)        A minimum of two characteristics is recommended for diagnosis of either severe or non-severe malnutrition.    Chart Review    Reason Seen: continuous nutrition monitoring and physician consult for TF    Malnutrition Screening Tool Results   Have you recently lost weight without trying?: Unsure  Have you been eating poorly because of a decreased appetite?: No    MST Score: 2   Diagnosis:  Severe sepsis   UTI  Afib with RVR  Hypernatremia   Sacral wound    Relevant Medical History:    Arthritis      Atrial fibrillation      BPH (benign prostatic hyperplasia)      Cardiac arrest      Coronary artery disease      Cyst, kidney, acquired      Diverticulosis      Hyperlipidemia      Hypertension      MI (myocardial infarction)      Obesity      Steatosis of liver      Stroke      Scheduled Medications:  amiodarone, 200 mg, Daily  apixaban, 10 mg, BID  ciprofloxacin HCl, 750 mg, Q12H  metoprolol tartrate, 25 mg, BID  metroNIDAZOLE, 500 mg, Q8H  pantoprazole, 40 mg, Daily  QUEtiapine, 50 mg, BID  sodium chloride 3%, 4 mL, Q8H  sulfamethoxazole-trimethoprim 800-160mg, 1 tablet, BID    Continuous Infusions:       PRN Medications:  0.9%  NaCl infusion (for blood administration), , Q24H PRN  acetaminophen, 650 mg, Q6H PRN  dextromethorphan-guaiFENesin  mg/5 ml, 10 mL, Q4H PRN  dextrose 10%, 12.5 g, PRN  dextrose 10%, 25 g, PRN  diatrizoate meglumineand-diatrizoate sodium, 30 mL, ONCE PRN  glucagon (human recombinant), 1 mg, PRN  hydrALAZINE, 10 mg, Q4H PRN  metoclopramide HCl, 10 mg, Q8H PRN  naloxone, 0.02 mg, PRN  prochlorperazine, 5 mg, Q6H PRN  sodium chloride 0.9%, 10 mL, Q12H PRN    Calorie Containing IV Medications: no significant kcals from medications at this time    Recent Labs   Lab 11/13/24  0310 11/14/24  1238 11/15/24  0613 11/15/24  0652 11/16/24  0314 11/17/24  0336 11/18/24  0339   NA  --   --  140  --  138 143 143   K  --   --  4.0  --  4.0 4.4 4.2   CALCIUM  --   --  7.5*  --  7.5* 7.6* 7.7*   CL  --   --  106  --  107 111* 111*   CO2  --   --  27  --  26 28 26   BUN  --   --  18.1  --  18.0 16.4 17.5   CREATININE  --   --  0.53*  --  0.56* 0.58* 0.58*   EGFRNORACEVR  --   --  >60  --  >60 >60 >60   GLUCOSE  --   --  118*  --  111 97 106   BILITOT  --   --  0.3  --  0.3 0.2 0.3   ALKPHOS  --   --  62  --  68 68 68   ALT  --   --  12  --  12 11 10   AST  --    --  19  --  19 16 15   ALBUMIN  --   --  1.9*  --  1.9* 1.9* 2.0*   PREALB  --  12.8*  --   --   --   --   --    WBC 9.96  --   --  8.88 8.78 7.50 16.21*   HGB 7.8*  --   --  7.6* 7.4* 8.3* 8.1*   HCT 24.2*  --   --  24.1* 23.8* 26.8* 26.3*     Nutrition Orders:  Diet NPO  Tube Feedings/Formulas 70; 1,400; Impact Peptide 1.5; Peg; 100; Every 2 hours,Tube Feedings/Formulas Other (see comments); Peg; Patel - Orange    Appetite/Oral Intake: not applicable/not applicable  Factors Affecting Nutritional Intake: on mechanical ventilation and tracheostomy  Social Needs Impacting Access to Food: none identified (from NH)  Food/Moravian/Cultural Preferences: unable to obtain  Food Allergies: no known food allergies  Last Bowel Movement: 11/16/24  Wound(s):[REMOVED]      Altered Skin Integrity 02/26/24 1100 lower Buttocks Moisture associated dermatitis-Tissue loss description: Full thickness       Wound 08/19/24 1500 Pressure Injury Left anterior Leg-Tissue loss description: Partial thickness       Wound 08/05/24 1420 Pressure Injury Left Knee-Tissue loss description: Partial thickness       Wound 10/20/24 2100 Pressure Injury Sacral spine-Tissue loss description: Full thickness       Wound 08/19/24 1500 Pressure Injury Left lateral Ankle-Tissue loss description: Partial thickness     Comments    10/21/24: Pt with previous EMR wts indicating wt loss. On NH TF. Also with increased protein needs due to wounds.     10/25/24: TF on hold. Pt continues with vomiting. On Reglan.     10/29/24: TF still off. Plans for gtube extension into jejunum.     11/1/24: TF @ 55ml/hr, plans to advance per RN. Pt now with G-J tube placement. Tolerated so far per RN.     11/5/24: TF continues. Tolerated per RN. Noted recent labs, may need to consider adding additional vitamin and minerals.     11/7/24: TF continues, tolerated @ goal rate per RN.     11/11/24;TF continues, tolerated @ goal rate per RN. Noted plans for family meeting today.  "    24: TF continues, tolerated per RN.     24: TF continues, tolerated per RN.     Anthropometrics    Height: 5' 9.02" (175.3 cm), Height Method: Estimated  Last Weight: 69.1 kg (152 lb 5.4 oz) (10/21/24 1026), Weight Method: Bed Scale  BMI (Calculated): 22.5  BMI Classification: normal (BMI 18.5-24.9)        Ideal Body Weight (IBW), Male: 160.12 lb     % Ideal Body Weight, Male (lb): 95.21 %                 Usual Body Weight (UBW), k kg (per EMR from 24)  % Usual Body Weight: 76.94  % Weight Change From Usual Weight: -23.22 %  Usual Weight Provided By: EMR weight history    Wt Readings from Last 5 Encounters:   10/21/24 69.1 kg (152 lb 5.4 oz)   24 90.7 kg (199 lb 15.3 oz)   24 117.9 kg (260 lb)   24 117.9 kg (260 lb)   24 90.7 kg (200 lb)     Weight Change(s) Since Admission:   Wt Readings from Last 1 Encounters:   10/21/24 1026 69.1 kg (152 lb 5.4 oz)   10/21/24 0634 69.1 kg (152 lb 5.4 oz)   10/20/24 1535 63.5 kg (140 lb)   Admit Weight: 63.5 kg (140 lb) (10/20/24 1535), Weight Method: Estimated    Estimated Needs    Weight Used For Calorie Calculations: 69.1 kg (152 lb 5.4 oz)  Energy Calorie Requirements (kcal): 1886kcal (1.3 stress factor)  Energy Need Method: [place] [facility]  Weight Used For Protein Calculations: 69.1 kg (152 lb 5.4 oz)  Protein Requirements: 124-138gm (1.8-2g/kg)  Fluid Requirements (mL): 2073ml (30ml/kg)  CHO Requirement: 210gm (45% est kcal needs)     Enteral Nutrition     Formula: Impact Peptide 1.5 [place]  Rate/Volume: 70ml/hr  Water Flushes: 100ml q2hr  Additives/Modulars: Patel  Route: PEG/J  Method: continuous  Total Nutrition Provided by Tube Feeding, Additives, and Flushes:  Calories Provided  2100 kcal/d, 111% needs   Protein Provided  131 g/d, 100% needs   Fluid Provided  2078 ml/d, 100% needs   Continuous feeding calculations based on estimated 20 hr/d run time unless otherwise stated.    Parenteral Nutrition     Patient not receiving " parenteral nutrition support at this time.    Evaluation of Received Nutrient Intake    Calories: meeting estimated needs  Protein: meeting estimated needs    Patient Education     Not applicable.    Nutrition Diagnosis     PES: Inadequate oral intake related to chronic illness as evidenced by trach/G-J tube. (active)     PES: Severe chronic disease or condition related malnutrition related to chronic illness as evidenced by severe fat depletion, severe muscle depletion, and greater than 10% weight loss in 6 months. (active)    Nutrition Interventions     Intervention(s): collaboration with other providers    Goal: Meet greater than 80% of nutritional needs by follow-up. (goal progressing)  Goal: Tolerate enteral feeding at goal rate by follow-up. (goal progressing)    Nutrition Goals & Monitoring     Dietitian will monitor: energy intake and enteral nutrition intake  Discharge planning: too early to determine; pending clinical course  Nutrition Risk/Follow-Up: high (follow-up in 1-4 days)   Please consult if re-assessment needed sooner.

## 2024-11-18 NOTE — PROGRESS NOTES
[facility] - [facility] ICU  Wound Care    Patient Name:  Delio Daniel Jr.   MRN:  [id number]  Date: 11/18/2024  Diagnosis: UTI (urinary tract infection)    History:     Past Medical History:   Diagnosis Date    Arthritis     Atrial fibrillation     BPH (benign prostatic hyperplasia)     Cardiac arrest     Coronary artery disease     Cyst, kidney, acquired     Diverticulosis     Hyperlipidemia     Hypertension     MI (myocardial infarction)     Obesity     Steatosis of liver     Stroke        Social History     Socioeconomic History    Marital status:     Number of children: 9   Occupational History    Occupation: retired   Tobacco Use    Smoking status: Never    Smokeless tobacco: Never   Substance and Sexual Activity    Alcohol use: Not Currently    Drug use: Not Currently    Sexual activity: Not Currently     Partners: Female     Social Drivers of Health     Financial Resource Strain: Patient Unable To Answer (10/21/2024)    Overall Financial Resource Strain (CARDIA)     Difficulty of Paying Living Expenses: Patient unable to answer   Food Insecurity: Patient Unable To Answer (10/21/2024)    Hunger Vital Sign     Worried About Running Out of Food in the Last Year: Patient unable to answer     Ran Out of Food in the Last Year: Patient unable to answer   Transportation Needs: Patient Unable To Answer (10/21/2024)    TRANSPORTATION NEEDS     Transportation : Patient unable to answer   Physical Activity: Sufficiently Active (8/5/2024)    Exercise Vital Sign     Days of Exercise per Week: 5 days     Minutes of Exercise per Session: 30 min   Stress: Patient Unable To Answer (10/21/2024)    [employer] - Occupational Stress Questionnaire     Feeling of Stress : Patient unable to answer   Housing Stability: Patient Unable To Answer (10/21/2024)    Housing Stability Vital Sign     Unable to Pay for Housing in the Last Year: Patient unable to answer     Homeless in the Last  Year: Patient unable to answer       Precautions:     Allergies as of 10/20/2024    (No Known Allergies)       [facility] Assessment Details/Treatment        11/18/24 1030   WO Assessment   Visit Date 11/18/24   Visit Time 1030   Consult Type Follow Up   [facility] Speciality Wound   WO List wound vac   Wound pressure   Intervention chart review;assessed;changed;orders   Teaching on-going        Wound 10/20/24 2100 Pressure Injury Sacral spine   Date First Assessed/Time First Assessed: 10/20/24 2100   Present on Original Admission: Yes  Primary Wound Type: Pressure Injury  Location: Sacral spine  Is this injury device related?: No   Wound Image    Pressure Injury Stage 4   Dressing Appearance Intact;Moist drainage   Drainage Amount Small   Drainage Characteristics/Odor Serosanguineous   Appearance Pink;Red;Yellow   Tissue loss description Full thickness   Black (%), Wound Tissue Color 0 %   Red (%), Wound Tissue Color 70 %   Yellow (%), Wound Tissue Color 30 %   Periwound Area Intact;Dry;Pink;Pale white;Scar tissue;Redness;Macerated   Wound Edges Irregular;Jagged   Wound Length (cm) 6 cm   Wound Width (cm) 8 cm   Wound Depth (cm) 3.4 cm   Wound Volume (cm^3) 163.2 cm^3   Wound Surface Area (cm^2) 48 cm^2   Undermining (depth (cm)/location) UM 1-5 @ 2.5cm   Care Cleansed with:;Wound cleanser  (vashe)   Dressing Removed;Other (comment);Applied;Gauze, wet to dry  (NPWT)     WO follow up for removal of wound vac to sacral wound. Wound Care NPMarcello assisted me with wound vac removal along with ICU nurse Wiilan. No family at bedside. Pt. Trached, on ventilator. Cleaned sacral wound with vashe. Removed NPWT. Treatment recommendations: Sacrum: clean w/ vashe, apply vashe moistened gauze to wound bed, cover w/ dry gauze/abd pad, and secure w/ tape. BID/PRN if soilage. CM working on pt. Transfer to [place]. Will follow up.   11/18/2024

## 2024-11-18 NOTE — PLAN OF CARE
11/18/24 1721   Final Note   Assessment Type Final Discharge Note   Anticipated Discharge Disposition [facility] Resources/Appts/Education Provided Dilaysis schedule provided   Post-Acute Status   Post-Acute Authorization Placement   Post-Acute Placement Status Set-up Complete/Auth obtained   Discharge Delays None known at this time     Patient discharged to [facility] via [facility]. Nurse given d/c packet and d/c information sent to [facility] per Odalys [facility]. Notified Jasbir with [facility] to follow up with patient at NH.

## 2024-11-18 NOTE — PLAN OF CARE
Spoke to the admit nurse at [facility]. She stated that the wound vac for this patient has been received, however she was concerned with the patient's WBC increase from 7.5 to 16 in one day. Made CM aware of this information.

## 2024-11-18 NOTE — PROGRESS NOTES
Pulmonary & Critical Care Medicine   Progress Note      Presenting History/HPI:    Patient is a 68 y/o male with extensive PMH who presented from NH on 10/20/24 with decreased responsiveness, severe sepsis, and recurrent UTI. 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     Patient transferred to ED from [facility] with lethargy and tachycardia. Family at bedside reports patient is nonverbal at baseline but typically more alert. In the ED, patient is febrile, tachycardic with -190s, tachypneic, /60. EKG shows Afib with RVR. Diltiazem drip started in ED. Labs are significant for WBC of 16.5, lactic acid of 3.3, Cr 1.48, BUN 45.3, Na 155, K+ 5.1, troponin elevated at 0.118. UA with evidence of UTI. Of note, pt was being treated outpatient for a UTI, currently on day 4 of 7 day Rocephin course. Urine culture 10/16/24 with multidrug resistant Klebsiella pneumonia and Proteus mirabilis with susceptibility to Cefepime. Patient received 3L of NS and initiated on Vanc and Cefepime in ED. Admitted to the ICU on mechanical ventilation via trach.    Admitted to the ICU on 10/20   Peg tube extension to J-tube on 10/30    Interval History:  NAEON. Hiccups have resolved overnight. Nurse reports episode of SBP in 80s intermittently however MAP at goal. Morning labs pending.       Scheduled Medications:    amiodarone  200 mg Per G Tube Daily    ciprofloxacin HCl  750 mg Oral Q12H    enoxparin  1 mg/kg Subcutaneous Q12H (treatment, non-standard time)    metoprolol tartrate  25 mg Per G Tube BID    metroNIDAZOLE  500 mg Oral Q8H    pantoprazole  40 mg Intravenous Daily    QUEtiapine  50 mg Per G Tube BID    sodium  chloride 3%  4 mL Nebulization Q8H    sulfamethoxazole-trimethoprim 800-160mg  1 tablet Oral BID       PRN Medications:     Current Facility-Administered Medications:     0.9%  NaCl infusion (for blood administration), , Intravenous, Q24H PRN    acetaminophen, 650 mg, Per G Tube, Q6H PRN    dextromethorphan-guaiFENesin  mg/5 ml, 10 mL, Per G Tube, Q4H PRN    dextrose 10%, 12.5 g, Intravenous, PRN    dextrose 10%, 25 g, Intravenous, PRN    diatrizoate meglumineand-diatrizoate sodium, 30 mL, Oral, ONCE PRN    glucagon (human recombinant), 1 mg, Intramuscular, PRN    hydrALAZINE, 10 mg, Intravenous, Q4H PRN    metoclopramide HCl, 10 mg, Oral, Q8H PRN    naloxone, 0.02 mg, Intravenous, PRN    prochlorperazine, 5 mg, Intravenous, Q6H PRN    sodium chloride 0.9%, 10 mL, Intravenous, Q12H PRN      Infusions:          Fluid Balance:     Intake/Output Summary (Last 24 hours) at 11/18/2024 0420  Last data filed at 11/18/2024 0000  Gross per 24 hour   Intake 2514 ml   Output 3460 ml   Net -946 ml         Vital Signs:   Vitals:    11/18/24 0258   BP:    Pulse: 94   Resp: (!) 25   Temp:          Physical Exam  Vitals and nursing note reviewed.   Constitutional:       Appearance: He is ill-appearing.   HENT:      Head: Normocephalic.      Right Ear: External ear normal.      Left Ear: External ear normal.      Nose: Nose normal.      Mouth/Throat:      Mouth: Mucous membranes are moist.   Eyes:      Pupils: Pupils are equal, round, and reactive to light.   Neck:      Comments: Tracheostomy in place, site clean and dry  Cardiovascular:      Rate and Rhythm: Normal rate and regular rhythm.      Pulses: Normal pulses.      Heart sounds: Normal heart sounds. No murmur heard.     No friction rub. No gallop.   Pulmonary:      Effort: Pulmonary effort is normal. No respiratory distress.      Breath sounds: Normal breath sounds. No wheezing or rhonchi.      Comments: On mechanical ventilation via tracheostomy tube, no dyssynchrony  "seen on mechanical ventilation, no accessory muscle use   Abdominal:      General: Abdomen is flat. Bowel sounds are normal. There is no distension.      Palpations: Abdomen is soft.      Tenderness: There is no abdominal tenderness.      Comments: J-tube in place site clean and dry   Musculoskeletal:      Cervical back: Normal range of motion. No rigidity or tenderness.   Skin:     General: Skin is warm and dry.      Capillary Refill: Capillary refill takes less than 2 seconds.      Coloration: Skin is not jaundiced.   Neurological:      Mental Status: Mental status is at baseline.      Comments: Nonverbal, opens eyes to loud name call, not moving any extremities randomly or on command   Psychiatric:      Comments: Unable to fully assess       Ventilator Settings  Vent Mode: A/C (11/18/24 0258)  Ventilator Initiated: Yes (10/20/24 1546)  Set Rate: 20 BPM (11/18/24 0258)  Vt Set: 500 mL (11/18/24 0258)  PEEP/CPAP: 5 cmH20 (11/18/24 0258)  Oxygen Concentration (%): 30 (11/18/24 0258)  Peak Airway Pressure: 18 cmH20 (11/18/24 0258)  Plateau Pressure: 3 cmH20 (11/01/24 0338)  Total Ve: 10.7 L/m (11/18/24 0258)  F/VT Ratio<105 (RSBI): (!) 75.08 (11/18/24 0258)      Laboratory Studies:   No results for input(s): "PH", "PCO2", "PO2", "HCO3", "POCSATURATED", "BE" in the last 24 hours.    No results for input(s): "WBC", "RBC", "HGB", "HCT", "PLT", "MCV", "MCH", "MCHC" in the last 24 hours.          No results for input(s): "GLUCOSE", "NA", "K", "CL", "CO2", "BUN", "CREATININE", "CALCIUM", "MG" in the last 24 hours.            Microbiology Data:   Microbiology Results (last 7 days)       Procedure Component Value Units Date/Time    Blood Culture [[id number]]  (Normal) Collected: 11/12/24 1017    Order Status: Completed Specimen: Blood from Hand, Left Updated: 11/17/24 1100     Blood Culture No Growth at 5 days    Blood Culture [[id number]]  (Normal) Collected: 11/12/24 1017    Order Status: Completed Specimen: Blood from " Hand, Right Updated: 11/17/24 1100     Blood Culture No Growth at 5 days    Blood Culture [[id number]]  (Normal) Collected: 11/08/24 0839    Order Status: Completed Specimen: Blood Updated: 11/13/24 1101     Blood Culture No Growth at 5 days    Blood Culture [[id number]]  (Normal) Collected: 11/08/24 0929    Order Status: Completed Specimen: Blood Updated: 11/13/24 1101     Blood Culture No Growth at 5 days    Respiratory Culture [[id number]]  (Abnormal)  (Susceptibility) Collected: 11/01/24 1619    Order Status: Completed Specimen: Respiratory from Sputum, Expectorated Updated: 11/11/24 0618     Respiratory Culture Many ACINETOBACTER BAUMANNII      Many Pseudomonas aeruginosa     GRAM STAIN Quality 2+      Many Gram Negative Rods      Few Yeast              Imaging:   CT Chest Abdomen Pelvis With IV Contrast (XPD) Routine Oral Contrast  Narrative: EXAMINATION:  CT CHEST ABDOMEN PELVIS WITH IV CONTRAST (XPD)    CLINICAL HISTORY:  fever;    TECHNIQUE:  Low dose axial images, sagittal and coronal reformations were obtained from the thoracic inlet to the pubic symphysis following the IV and oral contrast administration.  Automatic exposure control is utilized to reduce patient radiation exposure.    COMPARISON:  10/21/2024 and 08/19/2020    FINDINGS:  There is bilateral upper and lower lobe pneumonia with patchy interstitial and ground-glass infiltrates seen in both lungs.  There are bilateral pleural effusions.  Findings are worse on the right..    The thoracic aorta is normal in caliber..    Some reactive subcentimeter lymphadenopathy seen the mediastinum.    The heart is normal in size..    ..    The liver appears normal.  No liver mass or lesion is seen.  Portal and hepatic veins appear normal..    The gallbladder appears normal.  No gallstones are seen.    The spleen appears normal.  No splenic mass or lesion is seen.    The pancreas appears grossly unremarkable.  No pancreatic mass or lesion is seen.  No  inflammation is seen.    No adrenal abnormality is seen.  No adrenal nodule is seen.    The kidneys are well perfused.  There is a cyst in the lower pole the right kidney.  No hydronephrosis is seen.  No hydroureter is seen.  No retroperitoneal abnormality is seen..    Visualized portions of the bowel shows no acute abnormality.  No colitis is seen.  No diverticulitis is seen.  No colonic mass is seen.    No free air is seen.  No free fluid is seen.    There is persistent urinary bladder wall thickening seen.  Bladder is also decompressed.    There is a sacral decubitus seen which was present on the prior examination as well and appears similar.    There are chronic dysplastic changes seen in the left hip with severe heterotrophic bone formation seen and changes consistent with previous trauma.  This is unchanged since the prior examination.    .  Impression: Diffuse bilateral upper and lower lobe pneumonia and bilateral pleural effusions.  Findings have progressed since the prior examination    Sacral decubitus relatively stable since prior examination    Persistent urinary bladder wall thickening possibly due to decompression versus cystitis    Electronically signed by: Erick Grant  Date:    11/12/2024  Time:    15:49          Assessment and Plan    Assessment:  Sepsis without shock with underlying VRE Enterococcus and ESBL Klebsiella bacteremia with Klebsiella and Proteus pneumonia on 10/20/10/24 (resolved)  -Acinetobacter and Pseudomonas positive sputum culture on 11/01/2024 with Acinetobacter sensitive to gentamicin and tobramycin and Pseudomonas sensitive to ciprofloxacin gentamicin and tobramycin, suspect colonization without leukocytosis or increased sputum production or worsening hypoxia  Repeat pneumonia without sepsis and/or septic shock (11/08/2024)  -CXR on personal read showed new left sided infiltrate.   -blood cultures NTD  Chronic hypoxemic respiratory failure with tracheostomy on permanent  mechanical ventilatory support secondary to prior cerebrovascular accident with subsequent hemorrhagic stroke status post craniectomy   AFib with RVR (rate controlled)  Chronic sacral decubitus ulcer present on admission  -PEG tube feed intolerance status post J-tube extension with tolerance of tube feeds  Altered mental status due to the above intracranial process    Plan:  Continue mechanical ventilation on his chronic settings of volume control with Vt 500, RR 20, PEEP 5, FiO2 30%.  Continue antibiotics per Infectious Disease: Ciprofloxacin, Bactrim, Flagyl; inhaled Tobramycin administered from 11/7 to 11/11  Continue therapeutic Lovenox and transition to Eliquis at discharge.  Leave midline in place.  Continue with Metoprolol and Amiodarone  Trial of baclofen for hiccups seemed to help  Trend hemoglobin daily. Iron studies are consistent with anemia of chronic disease - supplemental iron unlikely to be beneficial  Appreciate wound care recommendations  Appreciate palliative care recommendations  Appreciate case management recommendations regarding arrangement of wound VAC for discharge    DVT ppx/tx with Lovenox  GI ppx with protonix  Keep HOB elevated > 30*      Yuriy Mercado MD  11/18/2024  Pulmonology/Critical Care

## 2024-11-18 NOTE — PROGRESS NOTES
[facility] - [facility] ICU  Wound Care  Progress Note    Patient Name: Delio Daniel Jr.  MRN: [id number]  Admission Date: 10/20/2024  Hospital Length of Stay: 29 days  Attending Physician: Jamil Hutchins Jr., MD,*  Primary Care Provider: Jl Briones MD     Subjective:     HPI:  Wound medicine re-check    The patient is a 69 year old male who presented to [facility] ED on 10/20/24 from [facility] with decreased responsiveness, sepsis, and recurrent UTI. Noted to be hypotensive with Afib and RVR, also requiring mechanical ventilation and admitted to the ICU.   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 Patient is non-verbal at baseline, contracted upper and lower extremities on left side. 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   Blood cultures on admission + Enterococcus faecium - VRE per BCID. Urine culture with Klebsiella and Proteus. ID guiding antibiotic stewardship.     Patient admitted with unstageable pressure ulcer of sacrum; seen by inpatient wound nurse and treatment was iniatated, wound medicine consulted for evaluation and possible debridement.   No family present at time of assessment, all information gained through chart review. In June 2024 appears that patient was seen by wound  for sacral pressure ulcer, no visit notes or images availabe from this time frame. First image of sacral region in May 2024 with wound of sacrum/coccyx. In July 2024 images appears that wound had healed with remaining dark discoloration of sacrum/buttocks and then again noted with wound in images of late August 2024; appears to have evolved and worsened up to this point.  Initial evaluation of wounds for this encounter done on 10/22/24 - several pressure ulcers with most concerning is  the sacral/coccyx unstageable ulcer. Also with fecal incontinence which is contaminating the wound as it is very close to the anus. He is contracted and we were unable to get full visualization and positioning for bedside debridement. Recommendations made for palliative care consultation  to discuss overall goals of care.   Palliative care consulted and discussion held with family in which they have decided to continue with treatment.Palliative met with family again on 11/11 with wishes to continue with aggressive supportive measures with full code status.   Bedside debridement of sacral ulcer with Surgery on 11/2/24.   Wound Vac applied on 11/4/24  ID continues to follow,  guiding antibiotic stewardship. BC from 11/8 normal; BC from 11/12 preliminary no growth at 48 hours.   11/18/24 - wound re-check today. Met patient in room IN02, he is awake, non-verbal with trach on mechanical ventilation. He is on ICU low air loss bed with bilateral heel boots in place.  Accompanied by ICU nurse as well as inpatient wound nurse.  Difficult to reposition because of contractures. Febrile again today with noted increase of WBC from 7.5 on 11/17 to 16.01 on 11/18. No acute distress. Tolerates repositioning for wound care.              Hospital Course:   No notes on file      Follow-up For: Procedure(s) (LRB):  EGD w/ Jtube placement (N/A)    Post-Operative Day: 19 Days Post-Op    Scheduled Meds:   amiodarone  200 mg Per G Tube Daily    apixaban  10 mg Oral BID    ciprofloxacin HCl  750 mg Oral Q12H    metoprolol tartrate  25 mg Per G Tube BID    metroNIDAZOLE  500 mg Oral Q8H    pantoprazole  40 mg Intravenous Daily    QUEtiapine  50 mg Per G Tube BID    sodium chloride 3%  4 mL Nebulization Q8H    sulfamethoxazole-trimethoprim 800-160mg  1 tablet Oral BID     Continuous Infusions:  PRN Meds:  Current Facility-Administered Medications:     0.9%  NaCl infusion (for blood administration), , Intravenous, Q24H PRN    acetaminophen,  650 mg, Per G Tube, Q6H PRN    dextromethorphan-guaiFENesin  mg/5 ml, 10 mL, Per G Tube, Q4H PRN    dextrose 10%, 12.5 g, Intravenous, PRN    dextrose 10%, 25 g, Intravenous, PRN    diatrizoate meglumineand-diatrizoate sodium, 30 mL, Oral, ONCE PRN    glucagon (human recombinant), 1 mg, Intramuscular, PRN    hydrALAZINE, 10 mg, Intravenous, Q4H PRN    metoclopramide HCl, 10 mg, Oral, Q8H PRN    naloxone, 0.02 mg, Intravenous, PRN    prochlorperazine, 5 mg, Intravenous, Q6H PRN    sodium chloride 0.9%, 10 mL, Intravenous, Q12H PRN    Review of Systems   Unable to perform ROS: Patient nonverbal     Objective:     Vital Signs (Most Recent):  Temp: 100.3 °F (37.9 °C) (11/18/24 0800)  Pulse: 88 (11/18/24 1115)  Resp: (!) 21 (11/18/24 1115)  BP: (!) 94/59 (11/18/24 1115)  SpO2: 100 % (11/18/24 1115) Vital Signs (24h Range):  Temp:  [98.4 °F (36.9 °C)-100.3 °F (37.9 °C)] 100.3 °F (37.9 °C)  Pulse:  [] 88  Resp:  [13-39] 21  SpO2:  [78 %-100 %] 100 %  BP: ()/(54-85) 94/59     Weight: 69.1 kg (152 lb 5.4 oz)  Body mass index is 22.49 kg/m².     Physical Exam  Vitals reviewed.   Constitutional:       General: He is awake.      Comments: Rectal tube with watery brown stool; strong odor   Some leakage around tube, wound vac in place       HENT:      Head:      Comments: Right bone flap      Neck:      Comments: Tracheostomy   Pulmonary:      Comments: Mechanical ventilation    Abdominal:      Comments: PEG/J-Tube   Skin:     General: Skin is warm and dry.      Capillary Refill: Capillary refill takes less than 2 seconds.      Findings: Wound present.             Comments:     Left 1st and 2nd toe abrasions    Neurological:      Comments: Left upper and lower extremity contracted  Moves RUE         Sacrum: 6 x 8 x 3.4 cm Undermining from 1 to 5 o'clock - deepest 2.5 cm at 1 o'clock.       Left lateral knee: 0.8 x 1 cm       Left lateral ankle: 0.3 x 0.3 cm                Laboratory:  A1C:   Recent Labs   Lab  08/26/24  1221   HGBA1C 5.3     ABGs:   Recent Labs   Lab 11/18/24  0700   PH 7.480*   PCO2 42.0   HCO3 31.3*     BMP:   Recent Labs   Lab 11/18/24  0339      K 4.2   *   CO2 26   BUN 17.5   CREATININE 0.58*   CALCIUM 7.7*       CBC:   Recent Labs   Lab 11/18/24  0339   WBC 16.21*   RBC 2.95*   HGB 8.1*   HCT 26.3*   *   MCV 89.2   MCH 27.5   MCHC 30.8*     CMP:   Recent Labs   Lab 11/18/24  0339   CALCIUM 7.7*   ALBUMIN 2.0*      K 4.2   CO2 26   *   BUN 17.5   CREATININE 0.58*   ALKPHOS 68   ALT 10   AST 15   BILITOT 0.3       LFTs:   Recent Labs   Lab 11/18/24  0339   ALT 10   AST 15   ALKPHOS 68   BILITOT 0.3   ALBUMIN 2.0*       Microbiology Results (last 7 days)       Procedure Component Value Units Date/Time    Blood Culture [[id number]]  (Normal) Collected: 11/12/24 1017    Order Status: Completed Specimen: Blood from Hand, Left Updated: 11/17/24 1100     Blood Culture No Growth at 5 days    Blood Culture [[id number]]  (Normal) Collected: 11/12/24 1017    Order Status: Completed Specimen: Blood from Hand, Right Updated: 11/17/24 1100     Blood Culture No Growth at 5 days    Blood Culture [[id number]]  (Normal) Collected: 11/08/24 0839    Order Status: Completed Specimen: Blood Updated: 11/13/24 1101     Blood Culture No Growth at 5 days    Blood Culture [[id number]]  (Normal) Collected: 11/08/24 0929    Order Status: Completed Specimen: Blood Updated: 11/13/24 1101     Blood Culture No Growth at 5 days              Diagnostic Results:  I have reviewed all pertinent imaging results/findings within the past 24 hours.    Assessment/Plan:     Previously Unstageable pressure ulcer of sacrum now with bone exposure and evident Stage 4 pressure ulcer - present on admission , bedside debridement per surgery 11/2/214; wound vac applied on 11/4/24 - scant exposed bone - probable osteomyelitis   Stage 3 pressure ulcer of left knee - present on admission  Stage 4 pressure ulcer of left  lateral ankle - present on admission  CVA with residual cognitive/motor deficits; contractures   VRE bacteremia  Klebsiella bacteremia  Trach/vent and PEG tube dependent           PLAN:     Chart reviewed, patient examined and wounds assessed.  Opinion: Wounds re assessed today, no significant change.  Still with yellow slough covering depth of wound, scant bone exposure.  No obvious signs of infection. Wound Vac discontinued at this time in anticipation transfer out of this facility; resume Vashe moistened gauze dressings.   Patient accepted at [facility]; Okay to resume wound vac once at facility guided by their wound care provider.   Left knee and ankle wounds without signs of infection. Continue to cleanse with Vashe, apply mupirocin ointment and adaptic or Versatel then cover with foam border daily. Dry coverings have peeled away from left 1st and 2nd toe, start wound care of Mupirocin with cover dressing daily.    Continue to pad left side rail where knee rubs to prevent further breakdown.   Wound care orders: Sacrum - cleanse with Vashe, apply Vashe moistened gauze to wound bed, cover with ABD pad and secure with cloth tape, BID and PRN.   Monitor for signs & symptoms of deterioration  Offloading of sacrum/buttocks/heels at all times: YOLETTE mattress, turning q 2 hrs; use of wedges and heel offloading devices to be used at all times while in bed; ( CHATA Zavala). This needs to be reinforced by every staff nurse caring for patient on every shift of every day.  Incontinence: control moisture/wound contamination: No briefs; use things such as purewick or underwood catheter; rectal tube  Nutrition: Follow RD  recommendations for tube feeds; GI following for frequent regurgitation of feeds, J-tube placement w/EGD on 10/30/24. Prealbumin on 11/14/24 - 12.8.   Will try to follow weekly while admitted, but every nurse assigned to patient on every shift of every day needs to address daily wound care dressing changes and  offloading modalities including using heel offloading devices, wedges, YOLETTE mattress etc  Discussed with patient as well as nurse caring for patient today           The time spent including preparing to see the patient, obtaining patient history and assessment, evaluation of the plan of care, patient/caregiver counseling and education, orders, documentation, coordination of care, and other professional medical management activities for today's encounter was 55 minutes           TRUMAN Romeo  Wound Care  [facility] - [facility] ICU

## 2024-11-19 ENCOUNTER — LAB REQUISITION (OUTPATIENT)
Dept: LAB | Facility: HOSPITAL | Age: 69
End: 2024-11-19
Payer: MEDICARE

## 2024-11-19 DIAGNOSIS — Z02.2 ENCOUNTER FOR EXAMINATION FOR ADMISSION TO RESIDENTIAL INSTITUTION: ICD-10-CM

## 2024-11-19 LAB
25(OH)D3+25(OH)D2 SERPL-MCNC: 30 NG/ML (ref 30–80)
ALBUMIN SERPL-MCNC: 2.1 G/DL (ref 3.4–4.8)
ALBUMIN/GLOB SERPL: 0.6 RATIO (ref 1.1–2)
ALP SERPL-CCNC: 69 UNIT/L (ref 40–150)
ALT SERPL-CCNC: 10 UNIT/L (ref 0–55)
ANION GAP SERPL CALC-SCNC: 4 MEQ/L
AST SERPL-CCNC: 17 UNIT/L (ref 5–34)
BASOPHILS # BLD AUTO: 0.03 X10(3)/MCL
BASOPHILS NFR BLD AUTO: 0.4 %
BILIRUB SERPL-MCNC: 0.3 MG/DL
BUN SERPL-MCNC: 18.4 MG/DL (ref 8.4–25.7)
CALCIUM SERPL-MCNC: 7.7 MG/DL (ref 8.8–10)
CHLORIDE SERPL-SCNC: 109 MMOL/L (ref 98–107)
CHOLEST SERPL-MCNC: 66 MG/DL
CHOLEST/HDLC SERPL: 3 {RATIO} (ref 0–5)
CO2 SERPL-SCNC: 28 MMOL/L (ref 23–31)
CREAT SERPL-MCNC: 0.62 MG/DL (ref 0.72–1.25)
CREAT/UREA NIT SERPL: 30
EOSINOPHIL # BLD AUTO: 0.09 X10(3)/MCL (ref 0–0.9)
EOSINOPHIL NFR BLD AUTO: 1.1 %
ERYTHROCYTE [DISTWIDTH] IN BLOOD BY AUTOMATED COUNT: 20.8 % (ref 11.5–17)
FERRITIN SERPL-MCNC: 466.58 NG/ML (ref 21.81–274.66)
GFR SERPLBLD CREATININE-BSD FMLA CKD-EPI: >60 ML/MIN/1.73/M2
GLOBULIN SER-MCNC: 3.7 GM/DL (ref 2.4–3.5)
GLUCOSE SERPL-MCNC: 97 MG/DL (ref 82–115)
HCT VFR BLD AUTO: 26.9 % (ref 42–52)
HDLC SERPL-MCNC: 26 MG/DL (ref 35–60)
HGB BLD-MCNC: 8.3 G/DL (ref 14–18)
IMM GRANULOCYTES # BLD AUTO: 0.09 X10(3)/MCL (ref 0–0.04)
IMM GRANULOCYTES NFR BLD AUTO: 1.1 %
IRON SATN MFR SERPL: 18 % (ref 20–50)
IRON SERPL-MCNC: 33 UG/DL (ref 65–175)
LDLC SERPL CALC-MCNC: 32 MG/DL (ref 50–140)
LYMPHOCYTES # BLD AUTO: 1.19 X10(3)/MCL (ref 0.6–4.6)
LYMPHOCYTES NFR BLD AUTO: 14.5 %
MCH RBC QN AUTO: 27.9 PG (ref 27–31)
MCHC RBC AUTO-ENTMCNC: 30.9 G/DL (ref 33–36)
MCV RBC AUTO: 90.3 FL (ref 80–94)
MONOCYTES # BLD AUTO: 0.69 X10(3)/MCL (ref 0.1–1.3)
MONOCYTES NFR BLD AUTO: 8.4 %
NEUTROPHILS # BLD AUTO: 6.14 X10(3)/MCL (ref 2.1–9.2)
NEUTROPHILS NFR BLD AUTO: 74.5 %
NRBC BLD AUTO-RTO: 0 %
PLATELET # BLD AUTO: 535 X10(3)/MCL (ref 130–400)
PMV BLD AUTO: 9.6 FL (ref 7.4–10.4)
POTASSIUM SERPL-SCNC: 4.1 MMOL/L (ref 3.5–5.1)
PREALB SERPL-MCNC: 17.9 MG/DL (ref 16–42)
PROT SERPL-MCNC: 5.8 GM/DL (ref 5.8–7.6)
RBC # BLD AUTO: 2.98 X10(6)/MCL (ref 4.7–6.1)
SODIUM SERPL-SCNC: 141 MMOL/L (ref 136–145)
TIBC SERPL-MCNC: 152 UG/DL (ref 60–240)
TIBC SERPL-MCNC: 185 UG/DL (ref 250–450)
TRANSFERRIN SERPL-MCNC: 169 MG/DL (ref 163–344)
TRIGL SERPL-MCNC: 42 MG/DL (ref 34–140)
TSH SERPL-ACNC: 4.76 UIU/ML (ref 0.35–4.94)
VLDLC SERPL CALC-MCNC: 8 MG/DL
WBC # BLD AUTO: 8.23 X10(3)/MCL (ref 4.5–11.5)

## 2024-11-19 PROCEDURE — 80053 COMPREHEN METABOLIC PANEL: CPT | Performed by: INTERNAL MEDICINE

## 2024-11-19 PROCEDURE — 82306 VITAMIN D 25 HYDROXY: CPT | Performed by: INTERNAL MEDICINE

## 2024-11-19 PROCEDURE — 80177 DRUG SCRN QUAN LEVETIRACETAM: CPT | Performed by: INTERNAL MEDICINE

## 2024-11-19 PROCEDURE — 83550 IRON BINDING TEST: CPT | Performed by: INTERNAL MEDICINE

## 2024-11-19 PROCEDURE — 85025 COMPLETE CBC W/AUTO DIFF WBC: CPT | Performed by: INTERNAL MEDICINE

## 2024-11-19 PROCEDURE — 80061 LIPID PANEL: CPT | Performed by: INTERNAL MEDICINE

## 2024-11-19 PROCEDURE — 84443 ASSAY THYROID STIM HORMONE: CPT | Performed by: INTERNAL MEDICINE

## 2024-11-19 PROCEDURE — 84134 ASSAY OF PREALBUMIN: CPT | Performed by: INTERNAL MEDICINE

## 2024-11-19 PROCEDURE — 82728 ASSAY OF FERRITIN: CPT | Performed by: INTERNAL MEDICINE

## 2024-11-19 NOTE — NURSING
2000: Pt resting comfortably in bed. Rectal tube removed.     2030: Pt moved over from hosp bed to EMS stretcher. Hooked up to EMS vent and vital machine. No new issues.

## 2024-11-20 LAB — LEVETIRACETAM SERPL-MCNC: <1 MCG/ML (ref 10–40)

## 2024-11-21 LAB
BACTERIA SPEC CULT: ABNORMAL
BACTERIA SPEC CULT: ABNORMAL
GRAM STN SPEC: ABNORMAL
MAYO GENERIC ORDERABLE RESULT: NORMAL
MAYO GENERIC ORDERABLE RESULT: NORMAL

## 2024-12-27 ENCOUNTER — LAB REQUISITION (OUTPATIENT)
Dept: LAB | Facility: HOSPITAL | Age: 69
End: 2024-12-27
Payer: MEDICARE

## 2024-12-27 DIAGNOSIS — I63.513 CEREBRAL INFARCTION DUE TO UNSPECIFIED OCCLUSION OR STENOSIS OF BILATERAL MIDDLE CEREBRAL ARTERIES: ICD-10-CM

## 2024-12-27 LAB
ALBUMIN SERPL-MCNC: 3.3 G/DL (ref 3.4–4.8)
ALBUMIN/GLOB SERPL: 0.8 RATIO (ref 1.1–2)
ALP SERPL-CCNC: 125 UNIT/L (ref 40–150)
ALT SERPL-CCNC: 12 UNIT/L (ref 0–55)
ANION GAP SERPL CALC-SCNC: 19 MEQ/L
AST SERPL-CCNC: 16 UNIT/L (ref 5–34)
BASOPHILS # BLD AUTO: 0.06 X10(3)/MCL
BASOPHILS NFR BLD AUTO: 0.5 %
BILIRUB SERPL-MCNC: 1 MG/DL
BUN SERPL-MCNC: 17 MG/DL (ref 8.4–25.7)
CALCIUM SERPL-MCNC: 9.1 MG/DL (ref 8.8–10)
CHLORIDE SERPL-SCNC: 101 MMOL/L (ref 98–107)
CO2 SERPL-SCNC: 24 MMOL/L (ref 23–31)
CREAT SERPL-MCNC: 0.78 MG/DL (ref 0.72–1.25)
CREAT/UREA NIT SERPL: 22
EOSINOPHIL # BLD AUTO: 0.07 X10(3)/MCL (ref 0–0.9)
EOSINOPHIL NFR BLD AUTO: 0.6 %
ERYTHROCYTE [DISTWIDTH] IN BLOOD BY AUTOMATED COUNT: 15.7 % (ref 11.5–17)
GFR SERPLBLD CREATININE-BSD FMLA CKD-EPI: >60 ML/MIN/1.73/M2
GLOBULIN SER-MCNC: 4.3 GM/DL (ref 2.4–3.5)
GLUCOSE SERPL-MCNC: 68 MG/DL (ref 82–115)
HCT VFR BLD AUTO: 35.9 % (ref 42–52)
HGB BLD-MCNC: 11.3 G/DL (ref 14–18)
IMM GRANULOCYTES # BLD AUTO: 0.04 X10(3)/MCL (ref 0–0.04)
IMM GRANULOCYTES NFR BLD AUTO: 0.3 %
LYMPHOCYTES # BLD AUTO: 2.09 X10(3)/MCL (ref 0.6–4.6)
LYMPHOCYTES NFR BLD AUTO: 17.6 %
MAGNESIUM SERPL-MCNC: 2.4 MG/DL (ref 1.6–2.6)
MCH RBC QN AUTO: 28.5 PG (ref 27–31)
MCHC RBC AUTO-ENTMCNC: 31.5 G/DL (ref 33–36)
MCV RBC AUTO: 90.4 FL (ref 80–94)
MONOCYTES # BLD AUTO: 0.62 X10(3)/MCL (ref 0.1–1.3)
MONOCYTES NFR BLD AUTO: 5.2 %
NEUTROPHILS # BLD AUTO: 9.01 X10(3)/MCL (ref 2.1–9.2)
NEUTROPHILS NFR BLD AUTO: 75.8 %
NRBC BLD AUTO-RTO: 0 %
PHOSPHATE SERPL-MCNC: 4 MG/DL (ref 2.3–4.7)
PLATELET # BLD AUTO: 284 X10(3)/MCL (ref 130–400)
PMV BLD AUTO: 11.1 FL (ref 7.4–10.4)
POTASSIUM SERPL-SCNC: 2.8 MMOL/L (ref 3.5–5.1)
PREALB SERPL-MCNC: 16.6 MG/DL (ref 16–42)
PROT SERPL-MCNC: 7.6 GM/DL (ref 5.8–7.6)
RBC # BLD AUTO: 3.97 X10(6)/MCL (ref 4.7–6.1)
SODIUM SERPL-SCNC: 144 MMOL/L (ref 136–145)
WBC # BLD AUTO: 11.89 X10(3)/MCL (ref 4.5–11.5)

## 2024-12-27 PROCEDURE — 80053 COMPREHEN METABOLIC PANEL: CPT | Performed by: INTERNAL MEDICINE

## 2024-12-27 PROCEDURE — 84134 ASSAY OF PREALBUMIN: CPT | Performed by: INTERNAL MEDICINE

## 2024-12-27 PROCEDURE — 83735 ASSAY OF MAGNESIUM: CPT | Performed by: INTERNAL MEDICINE

## 2024-12-27 PROCEDURE — 84100 ASSAY OF PHOSPHORUS: CPT | Performed by: INTERNAL MEDICINE

## 2024-12-27 PROCEDURE — 80177 DRUG SCRN QUAN LEVETIRACETAM: CPT | Performed by: INTERNAL MEDICINE

## 2024-12-27 PROCEDURE — 85025 COMPLETE CBC W/AUTO DIFF WBC: CPT | Performed by: INTERNAL MEDICINE

## 2024-12-29 LAB — LEVETIRACETAM SERPL-MCNC: 31 MCG/ML (ref 10–40)

## 2025-01-02 ENCOUNTER — LAB REQUISITION (OUTPATIENT)
Dept: LAB | Facility: HOSPITAL | Age: 70
End: 2025-01-02
Payer: MEDICARE

## 2025-01-02 DIAGNOSIS — I10 ESSENTIAL (PRIMARY) HYPERTENSION: ICD-10-CM

## 2025-01-02 LAB
ANION GAP SERPL CALC-SCNC: 11 MEQ/L
BUN SERPL-MCNC: 20 MG/DL (ref 8.4–25.7)
CALCIUM SERPL-MCNC: 8.6 MG/DL (ref 8.8–10)
CHLORIDE SERPL-SCNC: 108 MMOL/L (ref 98–107)
CO2 SERPL-SCNC: 24 MMOL/L (ref 23–31)
CREAT SERPL-MCNC: 0.69 MG/DL (ref 0.72–1.25)
CREAT/UREA NIT SERPL: 29
GFR SERPLBLD CREATININE-BSD FMLA CKD-EPI: >60 ML/MIN/1.73/M2
GLUCOSE SERPL-MCNC: 87 MG/DL (ref 82–115)
MAGNESIUM SERPL-MCNC: 2.2 MG/DL (ref 1.6–2.6)
POTASSIUM SERPL-SCNC: 3.5 MMOL/L (ref 3.5–5.1)
SODIUM SERPL-SCNC: 143 MMOL/L (ref 136–145)

## 2025-01-02 PROCEDURE — 83735 ASSAY OF MAGNESIUM: CPT | Performed by: NURSE PRACTITIONER

## 2025-01-02 PROCEDURE — 80048 BASIC METABOLIC PNL TOTAL CA: CPT | Performed by: NURSE PRACTITIONER

## 2025-01-05 ENCOUNTER — HOSPITAL ENCOUNTER (INPATIENT)
Facility: HOSPITAL | Age: 70
LOS: 5 days | Discharge: HOME | DRG: 870 | End: 2025-01-10
Attending: STUDENT IN AN ORGANIZED HEALTH CARE EDUCATION/TRAINING PROGRAM | Admitting: INTERNAL MEDICINE
Payer: MEDICARE

## 2025-01-05 DIAGNOSIS — J18.9 PNEUMONIA OF BOTH LUNGS DUE TO INFECTIOUS ORGANISM, UNSPECIFIED PART OF LUNG: Primary | ICD-10-CM

## 2025-01-05 DIAGNOSIS — J18.9 PNEUMONIA DUE TO INFECTIOUS ORGANISM: ICD-10-CM

## 2025-01-05 DIAGNOSIS — A41.9 SEPSIS: ICD-10-CM

## 2025-01-05 DIAGNOSIS — R06.02 SOB (SHORTNESS OF BREATH): ICD-10-CM

## 2025-01-05 DIAGNOSIS — I48.91 ATRIAL FIBRILLATION WITH RVR: ICD-10-CM

## 2025-01-05 DIAGNOSIS — R53.83 FATIGUE: ICD-10-CM

## 2025-01-05 LAB
ABS NEUT (OLG): 15.28 X10(3)/MCL (ref 2.1–9.2)
ALBUMIN SERPL-MCNC: 2.8 G/DL (ref 3.4–4.8)
ALBUMIN/GLOB SERPL: 0.5 RATIO (ref 1.1–2)
ALP SERPL-CCNC: 100 UNIT/L (ref 40–150)
ALT SERPL-CCNC: 8 UNIT/L (ref 0–55)
AMORPH URATE CRY URNS QL MICRO: ABNORMAL /UL
ANION GAP SERPL CALC-SCNC: 16 MEQ/L
APTT PPP: 41.8 SECONDS (ref 23.2–33.7)
AST SERPL-CCNC: 17 UNIT/L (ref 5–34)
BACTERIA #/AREA URNS AUTO: ABNORMAL /HPF
BILIRUB SERPL-MCNC: 1.4 MG/DL
BILIRUB UR QL STRIP.AUTO: NEGATIVE
BNP BLD-MCNC: 253.8 PG/ML
BUN SERPL-MCNC: 48.3 MG/DL (ref 8.4–25.7)
CALCIUM SERPL-MCNC: 9.5 MG/DL (ref 8.8–10)
CAOX CRY UR QL COMP ASSIST: ABNORMAL
CHLORIDE SERPL-SCNC: 107 MMOL/L (ref 98–107)
CLARITY UR: ABNORMAL
CO2 SERPL-SCNC: 23 MMOL/L (ref 23–31)
COLOR UR AUTO: YELLOW
CREAT SERPL-MCNC: 2.48 MG/DL (ref 0.72–1.25)
CREAT/UREA NIT SERPL: 19
ERYTHROCYTE [DISTWIDTH] IN BLOOD BY AUTOMATED COUNT: 16.1 % (ref 11.5–17)
FLUAV AG UPPER RESP QL IA.RAPID: NOT DETECTED
FLUBV AG UPPER RESP QL IA.RAPID: NOT DETECTED
GFR SERPLBLD CREATININE-BSD FMLA CKD-EPI: 28 ML/MIN/1.73/M2
GLOBULIN SER-MCNC: 5.3 GM/DL (ref 2.4–3.5)
GLUCOSE SERPL-MCNC: 166 MG/DL (ref 82–115)
GLUCOSE UR QL STRIP: NORMAL
HCT VFR BLD AUTO: 35.1 % (ref 42–52)
HGB BLD-MCNC: 11 G/DL (ref 14–18)
HGB UR QL STRIP: NEGATIVE
INR PPP: 2.7
INSTRUMENT WBC (OLG): 17.77 X10(3)/MCL
KETONES UR QL STRIP: NEGATIVE
LACTATE SERPL-SCNC: 2.8 MMOL/L (ref 0.5–2.2)
LEUKOCYTE ESTERASE UR QL STRIP: 25
LYMPHOCYTES NFR BLD MANUAL: 1.42 X10(3)/MCL
LYMPHOCYTES NFR BLD MANUAL: 8 %
MCH RBC QN AUTO: 27.8 PG (ref 27–31)
MCHC RBC AUTO-ENTMCNC: 31.3 G/DL (ref 33–36)
MCV RBC AUTO: 88.6 FL (ref 80–94)
MONOCYTES NFR BLD MANUAL: 1.07 X10(3)/MCL (ref 0.1–1.3)
MONOCYTES NFR BLD MANUAL: 6 %
NEUTROPHILS NFR BLD MANUAL: 86 %
NITRITE UR QL STRIP: NEGATIVE
NRBC BLD AUTO-RTO: 0 %
PH UR STRIP: 5 [PH]
PLATELET # BLD AUTO: 271 X10(3)/MCL (ref 130–400)
PLATELET # BLD EST: NORMAL 10*3/UL
PMV BLD AUTO: 10.8 FL (ref 7.4–10.4)
POTASSIUM SERPL-SCNC: 3.7 MMOL/L (ref 3.5–5.1)
PROT SERPL-MCNC: 8.1 GM/DL (ref 5.8–7.6)
PROT UR QL STRIP: ABNORMAL
PROTHROMBIN TIME: 28.9 SECONDS (ref 12.5–14.5)
RBC # BLD AUTO: 3.96 X10(6)/MCL (ref 4.7–6.1)
RBC #/AREA URNS AUTO: ABNORMAL /HPF
RBC MORPH BLD: NORMAL
SARS-COV-2 RNA RESP QL NAA+PROBE: NOT DETECTED
SODIUM SERPL-SCNC: 146 MMOL/L (ref 136–145)
SP GR UR STRIP.AUTO: 1.02 (ref 1–1.03)
SQUAMOUS #/AREA URNS LPF: ABNORMAL /HPF
TROPONIN I SERPL-MCNC: 0.01 NG/ML (ref 0–0.04)
UROBILINOGEN UR STRIP-ACNC: 2
WBC # BLD AUTO: 17.77 X10(3)/MCL (ref 4.5–11.5)
WBC #/AREA URNS AUTO: ABNORMAL /HPF

## 2025-01-05 PROCEDURE — 94760 N-INVAS EAR/PLS OXIMETRY 1: CPT

## 2025-01-05 PROCEDURE — 83880 ASSAY OF NATRIURETIC PEPTIDE: CPT | Performed by: STUDENT IN AN ORGANIZED HEALTH CARE EDUCATION/TRAINING PROGRAM

## 2025-01-05 PROCEDURE — 99900031 HC PATIENT EDUCATION (STAT)

## 2025-01-05 PROCEDURE — 83605 ASSAY OF LACTIC ACID: CPT | Performed by: STUDENT IN AN ORGANIZED HEALTH CARE EDUCATION/TRAINING PROGRAM

## 2025-01-05 PROCEDURE — 85610 PROTHROMBIN TIME: CPT | Performed by: STUDENT IN AN ORGANIZED HEALTH CARE EDUCATION/TRAINING PROGRAM

## 2025-01-05 PROCEDURE — 25000003 PHARM REV CODE 250

## 2025-01-05 PROCEDURE — 84484 ASSAY OF TROPONIN QUANT: CPT | Performed by: STUDENT IN AN ORGANIZED HEALTH CARE EDUCATION/TRAINING PROGRAM

## 2025-01-05 PROCEDURE — 81001 URINALYSIS AUTO W/SCOPE: CPT | Performed by: STUDENT IN AN ORGANIZED HEALTH CARE EDUCATION/TRAINING PROGRAM

## 2025-01-05 PROCEDURE — 20000000 HC ICU ROOM

## 2025-01-05 PROCEDURE — 87040 BLOOD CULTURE FOR BACTERIA: CPT | Performed by: STUDENT IN AN ORGANIZED HEALTH CARE EDUCATION/TRAINING PROGRAM

## 2025-01-05 PROCEDURE — 93005 ELECTROCARDIOGRAM TRACING: CPT

## 2025-01-05 PROCEDURE — 96361 HYDRATE IV INFUSION ADD-ON: CPT

## 2025-01-05 PROCEDURE — 99900035 HC TECH TIME PER 15 MIN (STAT)

## 2025-01-05 PROCEDURE — 63600175 PHARM REV CODE 636 W HCPCS: Performed by: STUDENT IN AN ORGANIZED HEALTH CARE EDUCATION/TRAINING PROGRAM

## 2025-01-05 PROCEDURE — 99900022

## 2025-01-05 PROCEDURE — 87154 CUL TYP ID BLD PTHGN 6+ TRGT: CPT | Performed by: STUDENT IN AN ORGANIZED HEALTH CARE EDUCATION/TRAINING PROGRAM

## 2025-01-05 PROCEDURE — 96365 THER/PROPH/DIAG IV INF INIT: CPT | Mod: 59

## 2025-01-05 PROCEDURE — 5A1955Z RESPIRATORY VENTILATION, GREATER THAN 96 CONSECUTIVE HOURS: ICD-10-PCS | Performed by: STUDENT IN AN ORGANIZED HEALTH CARE EDUCATION/TRAINING PROGRAM

## 2025-01-05 PROCEDURE — 85730 THROMBOPLASTIN TIME PARTIAL: CPT | Performed by: STUDENT IN AN ORGANIZED HEALTH CARE EDUCATION/TRAINING PROGRAM

## 2025-01-05 PROCEDURE — 80053 COMPREHEN METABOLIC PANEL: CPT | Performed by: STUDENT IN AN ORGANIZED HEALTH CARE EDUCATION/TRAINING PROGRAM

## 2025-01-05 PROCEDURE — 96376 TX/PRO/DX INJ SAME DRUG ADON: CPT

## 2025-01-05 PROCEDURE — 0240U COVID/FLU A&B PCR: CPT | Performed by: STUDENT IN AN ORGANIZED HEALTH CARE EDUCATION/TRAINING PROGRAM

## 2025-01-05 PROCEDURE — 85027 COMPLETE CBC AUTOMATED: CPT | Performed by: STUDENT IN AN ORGANIZED HEALTH CARE EDUCATION/TRAINING PROGRAM

## 2025-01-05 PROCEDURE — 99900026 HC AIRWAY MAINTENANCE (STAT)

## 2025-01-05 PROCEDURE — 99285 EMERGENCY DEPT VISIT HI MDM: CPT | Mod: 25

## 2025-01-05 PROCEDURE — 27200966 HC CLOSED SUCTION SYSTEM

## 2025-01-05 PROCEDURE — 94002 VENT MGMT INPAT INIT DAY: CPT

## 2025-01-05 RX ORDER — NOREPINEPHRINE BITARTRATE/D5W 8 MG/250ML
PLASTIC BAG, INJECTION (ML) INTRAVENOUS
Status: COMPLETED
Start: 2025-01-05 | End: 2025-01-05

## 2025-01-05 RX ORDER — ACETAMINOPHEN 650 MG/1
650 SUPPOSITORY RECTAL EVERY 4 HOURS PRN
Status: DISCONTINUED | OUTPATIENT
Start: 2025-01-05 | End: 2025-01-10 | Stop reason: HOSPADM

## 2025-01-05 RX ORDER — NOREPINEPHRINE BITARTRATE/D5W 8 MG/250ML
0-3 PLASTIC BAG, INJECTION (ML) INTRAVENOUS CONTINUOUS
Status: DISCONTINUED | OUTPATIENT
Start: 2025-01-05 | End: 2025-01-10 | Stop reason: HOSPADM

## 2025-01-05 RX ORDER — FAMOTIDINE 10 MG/ML
20 INJECTION INTRAVENOUS DAILY
Status: DISCONTINUED | OUTPATIENT
Start: 2025-01-06 | End: 2025-01-10 | Stop reason: HOSPADM

## 2025-01-05 RX ORDER — ONDANSETRON HYDROCHLORIDE 2 MG/ML
4 INJECTION, SOLUTION INTRAVENOUS EVERY 8 HOURS PRN
Status: DISCONTINUED | OUTPATIENT
Start: 2025-01-05 | End: 2025-01-10 | Stop reason: HOSPADM

## 2025-01-05 RX ORDER — SODIUM CHLORIDE 0.9 % (FLUSH) 0.9 %
10 SYRINGE (ML) INJECTION
Status: DISCONTINUED | OUTPATIENT
Start: 2025-01-05 | End: 2025-01-10 | Stop reason: HOSPADM

## 2025-01-05 RX ORDER — ACETAMINOPHEN 10 MG/ML
1000 INJECTION, SOLUTION INTRAVENOUS ONCE
Status: COMPLETED | OUTPATIENT
Start: 2025-01-05 | End: 2025-01-05

## 2025-01-05 RX ADMIN — AMIODARONE HYDROCHLORIDE 1 MG/MIN: 1.8 INJECTION, SOLUTION INTRAVENOUS at 10:01

## 2025-01-05 RX ADMIN — ACETAMINOPHEN 1000 MG: 10 INJECTION, SOLUTION INTRAVENOUS at 09:01

## 2025-01-05 RX ADMIN — SODIUM CHLORIDE, POTASSIUM CHLORIDE, SODIUM LACTATE AND CALCIUM CHLORIDE 2000 ML: 600; 310; 30; 20 INJECTION, SOLUTION INTRAVENOUS at 09:01

## 2025-01-05 RX ADMIN — AMIODARONE HYDROCHLORIDE 150 MG: 1.5 INJECTION, SOLUTION INTRAVENOUS at 10:01

## 2025-01-05 RX ADMIN — NOREPINEPHRINE BITARTRATE 0.2 MCG/KG/MIN: 8 INJECTION, SOLUTION INTRAVENOUS at 11:01

## 2025-01-05 NOTE — Clinical Note
Diagnosis: Sepsis [932420]   Future Attending Provider: KEISHA EVANS JR. [[id number]]   Admit to which facility:: [facility] [[id number]]   Reason for IP Medical Treatment  (Clinical interventions that can only be accomplished in the IP setting? ) :: Sepsis, Afib with RVR

## 2025-01-06 LAB
ALBUMIN SERPL-MCNC: 2.3 G/DL (ref 3.4–4.8)
ALBUMIN/GLOB SERPL: 0.5 RATIO (ref 1.1–2)
ALLENS TEST BLOOD GAS (OHS): YES
ALP SERPL-CCNC: 91 UNIT/L (ref 40–150)
ALT SERPL-CCNC: 8 UNIT/L (ref 0–55)
ANION GAP SERPL CALC-SCNC: 16 MEQ/L
AST SERPL-CCNC: 14 UNIT/L (ref 5–34)
BASE EXCESS BLD CALC-SCNC: 6.4 MMOL/L (ref -2–2)
BASOPHILS # BLD AUTO: 0.08 X10(3)/MCL
BASOPHILS NFR BLD AUTO: 0.5 %
BILIRUB SERPL-MCNC: 1.3 MG/DL
BLOOD GAS SAMPLE TYPE (OHS): ABNORMAL
BUN SERPL-MCNC: 43.9 MG/DL (ref 8.4–25.7)
CA-I BLD-SCNC: 1.1 MMOL/L (ref 1.12–1.23)
CALCIUM SERPL-MCNC: 8.4 MG/DL (ref 8.8–10)
CHLORIDE SERPL-SCNC: 101 MMOL/L (ref 98–107)
CO2 BLDA-SCNC: 29.7 MMOL/L
CO2 SERPL-SCNC: 21 MMOL/L (ref 23–31)
COHGB MFR BLDA: 1.8 % (ref 0.5–1.5)
CREAT SERPL-MCNC: 2.07 MG/DL (ref 0.72–1.25)
CREAT/UREA NIT SERPL: 21
DRAWN BY BLOOD GAS (OHS): ABNORMAL
EOSINOPHIL # BLD AUTO: 0 X10(3)/MCL (ref 0–0.9)
EOSINOPHIL NFR BLD AUTO: 0 %
ERYTHROCYTE [DISTWIDTH] IN BLOOD BY AUTOMATED COUNT: 15.9 % (ref 11.5–17)
GFR SERPLBLD CREATININE-BSD FMLA CKD-EPI: 34 ML/MIN/1.73/M2
GLOBULIN SER-MCNC: 4.3 GM/DL (ref 2.4–3.5)
GLUCOSE SERPL-MCNC: 403 MG/DL (ref 82–115)
HCO3 BLDA-SCNC: 28.7 MMOL/L (ref 22–26)
HCT VFR BLD AUTO: 31.7 % (ref 42–52)
HGB BLD-MCNC: 10 G/DL (ref 14–18)
IMM GRANULOCYTES # BLD AUTO: 0.11 X10(3)/MCL (ref 0–0.04)
IMM GRANULOCYTES NFR BLD AUTO: 0.7 %
INHALED O2 CONCENTRATION: 30 %
LACTATE SERPL-SCNC: 2.5 MMOL/L (ref 0.5–2.2)
LYMPHOCYTES # BLD AUTO: 1.17 X10(3)/MCL (ref 0.6–4.6)
LYMPHOCYTES NFR BLD AUTO: 7 %
MCH RBC QN AUTO: 28.2 PG (ref 27–31)
MCHC RBC AUTO-ENTMCNC: 31.5 G/DL (ref 33–36)
MCV RBC AUTO: 89.3 FL (ref 80–94)
MECH RR (OHS): 30 B/MIN
METHGB MFR BLDA: 1 % (ref 0.4–1.5)
MODE (OHS): AC
MONOCYTES # BLD AUTO: 0.87 X10(3)/MCL (ref 0.1–1.3)
MONOCYTES NFR BLD AUTO: 5.2 %
NEUTROPHILS # BLD AUTO: 14.59 X10(3)/MCL (ref 2.1–9.2)
NEUTROPHILS NFR BLD AUTO: 86.6 %
NRBC BLD AUTO-RTO: 0 %
O2 HB BLOOD GAS (OHS): 95.2 % (ref 94–97)
OHS QRS DURATION: 88 MS
OHS QRS DURATION: 98 MS
OHS QTC CALCULATION: 431 MS
OHS QTC CALCULATION: 502 MS
OXYGEN DEVICE BLOOD GAS (OHS): ABNORMAL
OXYHGB MFR BLDA: 9.7 G/DL (ref 12–16)
PCO2 BLDA: 32 MMHG (ref 35–45)
PEEP RESPIRATORY: 5 CMH2O
PH BLDA: 7.56 [PH] (ref 7.35–7.45)
PLATELET # BLD AUTO: 254 X10(3)/MCL (ref 130–400)
PMV BLD AUTO: 10.4 FL (ref 7.4–10.4)
PO2 BLDA: 73 MMHG (ref 80–100)
POCT GLUCOSE: 126 MG/DL (ref 70–110)
POTASSIUM BLOOD GAS (OHS): 3.2 MMOL/L (ref 3.5–5)
POTASSIUM SERPL-SCNC: 3.2 MMOL/L (ref 3.5–5.1)
PROT SERPL-MCNC: 6.6 GM/DL (ref 5.8–7.6)
RBC # BLD AUTO: 3.55 X10(6)/MCL (ref 4.7–6.1)
SAMPLE SITE BLOOD GAS (OHS): ABNORMAL
SAO2 % BLDA: 96.4 %
SODIUM BLOOD GAS (OHS): 139 MMOL/L (ref 137–145)
SODIUM SERPL-SCNC: 138 MMOL/L (ref 136–145)
SPONT+MECH VT ON VENT: 450 ML
TOBRAMYCIN PEAK SERPL-MCNC: <0.3 UG/ML (ref 4–8)
VANCOMYCIN SERPL-MCNC: 15.6 UG/ML (ref 15–20)
WBC # BLD AUTO: 16.82 X10(3)/MCL (ref 4.5–11.5)

## 2025-01-06 PROCEDURE — 25000003 PHARM REV CODE 250: Performed by: STUDENT IN AN ORGANIZED HEALTH CARE EDUCATION/TRAINING PROGRAM

## 2025-01-06 PROCEDURE — 63600175 PHARM REV CODE 636 W HCPCS: Performed by: STUDENT IN AN ORGANIZED HEALTH CARE EDUCATION/TRAINING PROGRAM

## 2025-01-06 PROCEDURE — 63600175 PHARM REV CODE 636 W HCPCS: Performed by: INTERNAL MEDICINE

## 2025-01-06 PROCEDURE — 80200 ASSAY OF TOBRAMYCIN: CPT | Performed by: STUDENT IN AN ORGANIZED HEALTH CARE EDUCATION/TRAINING PROGRAM

## 2025-01-06 PROCEDURE — 27100171 HC OXYGEN HIGH FLOW UP TO 24 HOURS

## 2025-01-06 PROCEDURE — 25000003 PHARM REV CODE 250: Performed by: INTERNAL MEDICINE

## 2025-01-06 PROCEDURE — 99900035 HC TECH TIME PER 15 MIN (STAT)

## 2025-01-06 PROCEDURE — 36415 COLL VENOUS BLD VENIPUNCTURE: CPT | Performed by: STUDENT IN AN ORGANIZED HEALTH CARE EDUCATION/TRAINING PROGRAM

## 2025-01-06 PROCEDURE — C1751 CATH, INF, PER/CENT/MIDLINE: HCPCS

## 2025-01-06 PROCEDURE — 94761 N-INVAS EAR/PLS OXIMETRY MLT: CPT | Mod: XB

## 2025-01-06 PROCEDURE — 94799 UNLISTED PULMONARY SVC/PX: CPT

## 2025-01-06 PROCEDURE — 85025 COMPLETE CBC W/AUTO DIFF WBC: CPT

## 2025-01-06 PROCEDURE — 25000003 PHARM REV CODE 250

## 2025-01-06 PROCEDURE — 27200966 HC CLOSED SUCTION SYSTEM

## 2025-01-06 PROCEDURE — 02HV33Z INSERTION OF INFUSION DEVICE INTO SUPERIOR VENA CAVA, PERCUTANEOUS APPROACH: ICD-10-PCS | Performed by: INTERNAL MEDICINE

## 2025-01-06 PROCEDURE — 99900022

## 2025-01-06 PROCEDURE — 99900031 HC PATIENT EDUCATION (STAT)

## 2025-01-06 PROCEDURE — 94760 N-INVAS EAR/PLS OXIMETRY 1: CPT

## 2025-01-06 PROCEDURE — 36569 INSJ PICC 5 YR+ W/O IMAGING: CPT

## 2025-01-06 PROCEDURE — 94003 VENT MGMT INPAT SUBQ DAY: CPT

## 2025-01-06 PROCEDURE — 36415 COLL VENOUS BLD VENIPUNCTURE: CPT

## 2025-01-06 PROCEDURE — 80202 ASSAY OF VANCOMYCIN: CPT | Performed by: INTERNAL MEDICINE

## 2025-01-06 PROCEDURE — 82803 BLOOD GASES ANY COMBINATION: CPT

## 2025-01-06 PROCEDURE — 83605 ASSAY OF LACTIC ACID: CPT | Performed by: STUDENT IN AN ORGANIZED HEALTH CARE EDUCATION/TRAINING PROGRAM

## 2025-01-06 PROCEDURE — 80053 COMPREHEN METABOLIC PANEL: CPT | Performed by: STUDENT IN AN ORGANIZED HEALTH CARE EDUCATION/TRAINING PROGRAM

## 2025-01-06 PROCEDURE — 99900026 HC AIRWAY MAINTENANCE (STAT)

## 2025-01-06 PROCEDURE — 36600 WITHDRAWAL OF ARTERIAL BLOOD: CPT

## 2025-01-06 PROCEDURE — 20000000 HC ICU ROOM

## 2025-01-06 RX ORDER — ATORVASTATIN CALCIUM 10 MG/1
10 TABLET, FILM COATED ORAL DAILY
Status: DISCONTINUED | OUTPATIENT
Start: 2025-01-06 | End: 2025-01-10 | Stop reason: HOSPADM

## 2025-01-06 RX ORDER — MUPIROCIN 20 MG/G
OINTMENT TOPICAL 2 TIMES DAILY
Status: DISCONTINUED | OUTPATIENT
Start: 2025-01-06 | End: 2025-01-10 | Stop reason: HOSPADM

## 2025-01-06 RX ORDER — SODIUM CHLORIDE 0.9 % (FLUSH) 0.9 %
10 SYRINGE (ML) INJECTION EVERY 12 HOURS PRN
Status: DISCONTINUED | OUTPATIENT
Start: 2025-01-06 | End: 2025-01-10 | Stop reason: HOSPADM

## 2025-01-06 RX ORDER — HEPARIN SODIUM,PORCINE/D5W 25000/250
0-40 INTRAVENOUS SOLUTION INTRAVENOUS CONTINUOUS
Status: DISCONTINUED | OUTPATIENT
Start: 2025-01-06 | End: 2025-01-06

## 2025-01-06 RX ORDER — DIGOXIN 0.25 MG/ML
500 INJECTION INTRAMUSCULAR; INTRAVENOUS ONCE
Status: COMPLETED | OUTPATIENT
Start: 2025-01-06 | End: 2025-01-06

## 2025-01-06 RX ORDER — CEFEPIME HYDROCHLORIDE 1 G/1
1 INJECTION, POWDER, FOR SOLUTION INTRAMUSCULAR; INTRAVENOUS
Status: DISCONTINUED | OUTPATIENT
Start: 2025-01-06 | End: 2025-01-07

## 2025-01-06 RX ORDER — BUSPIRONE HYDROCHLORIDE 5 MG/1
5 TABLET ORAL 3 TIMES DAILY
Status: DISCONTINUED | OUTPATIENT
Start: 2025-01-06 | End: 2025-01-10 | Stop reason: HOSPADM

## 2025-01-06 RX ORDER — LEVETIRACETAM 100 MG/ML
1500 SOLUTION ORAL 2 TIMES DAILY
Status: DISCONTINUED | OUTPATIENT
Start: 2025-01-06 | End: 2025-01-10 | Stop reason: HOSPADM

## 2025-01-06 RX ADMIN — ATORVASTATIN CALCIUM 10 MG: 10 TABLET, FILM COATED ORAL at 10:01

## 2025-01-06 RX ADMIN — VANCOMYCIN HYDROCHLORIDE 1250 MG: 1.25 INJECTION, POWDER, LYOPHILIZED, FOR SOLUTION INTRAVENOUS at 01:01

## 2025-01-06 RX ADMIN — NOREPINEPHRINE BITARTRATE 0.08 MCG/KG/MIN: 8 INJECTION, SOLUTION INTRAVENOUS at 10:01

## 2025-01-06 RX ADMIN — POTASSIUM BICARBONATE 25 MEQ: 978 TABLET, EFFERVESCENT ORAL at 10:01

## 2025-01-06 RX ADMIN — LEVETIRACETAM 1500 MG: 100 SOLUTION ORAL at 10:01

## 2025-01-06 RX ADMIN — MUPIROCIN: 20 OINTMENT TOPICAL at 09:01

## 2025-01-06 RX ADMIN — DIGOXIN 500 MCG: 0.25 INJECTION INTRAMUSCULAR; INTRAVENOUS at 10:01

## 2025-01-06 RX ADMIN — TOBRAMYCIN SULFATE 195 MG: 40 INJECTION, SOLUTION INTRAMUSCULAR; INTRAVENOUS at 03:01

## 2025-01-06 RX ADMIN — FAMOTIDINE 20 MG: 10 INJECTION, SOLUTION INTRAVENOUS at 08:01

## 2025-01-06 RX ADMIN — MUPIROCIN: 20 OINTMENT TOPICAL at 10:01

## 2025-01-06 RX ADMIN — AMIODARONE HYDROCHLORIDE 0.5 MG/MIN: 1.8 INJECTION, SOLUTION INTRAVENOUS at 04:01

## 2025-01-06 RX ADMIN — VANCOMYCIN HYDROCHLORIDE 750 MG: 750 INJECTION, POWDER, LYOPHILIZED, FOR SOLUTION INTRAVENOUS at 02:01

## 2025-01-06 RX ADMIN — BUSPIRONE HYDROCHLORIDE 5 MG: 5 TABLET ORAL at 09:01

## 2025-01-06 RX ADMIN — BACITRACIN ZINC, NEOMYCIN, POLYMYXIN B: 400; 3.5; 5 OINTMENT TOPICAL at 03:01

## 2025-01-06 RX ADMIN — LEVETIRACETAM 1500 MG: 100 SOLUTION ORAL at 09:01

## 2025-01-06 RX ADMIN — BUSPIRONE HYDROCHLORIDE 5 MG: 5 TABLET ORAL at 02:01

## 2025-01-06 RX ADMIN — APIXABAN 5 MG: 5 TABLET, FILM COATED ORAL at 09:01

## 2025-01-06 RX ADMIN — SODIUM CHLORIDE, POTASSIUM CHLORIDE, SODIUM LACTATE AND CALCIUM CHLORIDE 1000 ML: 600; 310; 30; 20 INJECTION, SOLUTION INTRAVENOUS at 10:01

## 2025-01-06 RX ADMIN — CEFEPIME 1 G: 1 INJECTION, POWDER, FOR SOLUTION INTRAMUSCULAR; INTRAVENOUS at 10:01

## 2025-01-06 RX ADMIN — AMIODARONE HYDROCHLORIDE 0.5 MG/MIN: 1.8 INJECTION, SOLUTION INTRAVENOUS at 05:01

## 2025-01-06 RX ADMIN — CEFEPIME 1 G: 1 INJECTION, POWDER, FOR SOLUTION INTRAMUSCULAR; INTRAVENOUS at 06:01

## 2025-01-06 NOTE — NURSING
Subjective:      Patient ID: Delio Daniel Jr. is a 69 y.o. male.    Chief Complaint: Fatigue (PT has a pneumonia has been vomiting feedings up, [facility] rehab attempted to get family to make pt a DNR status recently, they denied and wanted him brought in to be evaluated and treated. Pt has trach on ventilator )    HPI  Review of Systems   Objective:     Physical Exam   Assessment:     1. Pneumonia of both lungs due to infectious organism, unspecified part of lung    2. Fatigue    3. SOB (shortness of breath)    4. Sepsis    5. Atrial fibrillation with RVR    6. Pneumonia due to infectious organism           Wound 08/05/24 1420 Pressure Injury Left Knee (Active)   08/05/24 1420 Knee   Present on Original Admission: Y   Primary Wound Type: Pressure inj   Side: Left   Orientation:    Wound Approximate Age at First Assessment (Weeks):    Wound Number:    Is this injury device related?:    Incision Type:    Closure Method:    Wound Description (Comments):    Type:    Additional Comments:    Ankle-Brachial Index:    Pulses:    Removal Indication and Assessment:    Wound Outcome:    Wound Image   01/06/25 1000   Pressure Injury Stage 2 01/06/25 1000   Dressing Appearance Dried drainage 01/06/25 1000   Drainage Amount Scant 01/06/25 1000   Drainage Characteristics/Odor Serous 01/06/25 1000   Appearance Pink;Red;Moist 01/06/25 1000   Tissue loss description Partial thickness 01/06/25 1000   Red (%), Wound Tissue Color 100 % 01/06/25 1000   Periwound Area Dry;Intact 01/06/25 1000   Wound Edges Irregular 01/06/25 1000   Wound Length (cm) 0.5 cm 01/06/25 1000   Wound Width (cm) 0.4 cm 01/06/25 1000   Wound Surface Area (cm^2) 0.2 cm^2 01/06/25 1000   Care Cleansed with:;Wound cleanser;Applied:;Other (see comments);Ultrasound therapy 01/06/25 1000            Wound 01/05/25 2100 Pressure Injury medial Coccyx #1 (Active)   01/05/25 2100 Coccyx   Present on Original Admission: Y   Primary Wound Type: Pressure inj   Side:     Orientation: medial   Wound Approximate Age at First Assessment (Weeks):    Wound Number: #1   Is this injury device related?: No   Incision Type:    Closure Method:    Wound Description (Comments):    Type:    Additional Comments:    Ankle-Brachial Index:    Pulses:    Removal Indication and Assessment:    Wound Outcome:    Wound Image   01/06/25 1000   Pressure Injury Stage 4 01/06/25 1000   Dressing Appearance Saturated 01/06/25 1000   Drainage Amount Moderate 01/06/25 1000   Drainage Characteristics/Odor Serosanguineous 01/06/25 1000   Appearance Red;Moist 01/06/25 1000   Tissue loss description Full thickness 01/06/25 1000   Red (%), Wound Tissue Color 100 % 01/06/25 1000   Periwound Area Macerated 01/06/25 1000   Wound Edges Irregular 01/06/25 1000   Wound Length (cm) 4 cm 01/06/25 1000   Wound Width (cm) 4 cm 01/06/25 1000   Wound Depth (cm) 3 cm 01/06/25 1000   Wound Volume (cm^3) 48 cm^3 01/06/25 1000   Wound Surface Area (cm^2) 16 cm^2 01/06/25 1000   Care Cleansed with:;Antimicrobial agent 01/06/25 1000   Dressing Gauze, wet to dry 01/06/25 1000            Wound 01/06/25 1000 Pressure Injury Left lateral;distal Foot (Active)   01/06/25 1000 Foot   Present on Original Admission: Y   Primary Wound Type: Pressure inj   Side: Left   Orientation: lateral;distal   Wound Approximate Age at First Assessment (Weeks):    Wound Number:    Is this injury device related?:    Incision Type:    Closure Method:    Wound Description (Comments):    Type:    Additional Comments:    Ankle-Brachial Index:    Pulses:    Removal Indication and Assessment:    Wound Outcome:    Wound Image   01/06/25 1000   Pressure Injury Stage U 01/06/25 1000   Dressing Appearance Intact;Dry 01/06/25 1000   Drainage Amount None 01/06/25 1000   Appearance Black;Dry 01/06/25 1000   Black (%), Wound Tissue Color 100 % 01/06/25 1000   Periwound Area Dry 01/06/25 1000   Wound Edges Irregular 01/06/25 1000   Wound Length (cm) 1.5 cm 01/06/25 1000    Wound Width (cm) 1.5 cm 01/06/25 1000   Wound Surface Area (cm^2) 2.25 cm^2 01/06/25 1000   Care Cleansed with:;Wound cleanser;Applied:;Povidone iodine 01/06/25 1000            Wound 01/06/25 1000 Pressure Injury Left lateral;proximal Foot (Active)   01/06/25 1000 Foot   Present on Original Admission: Y   Primary Wound Type: Pressure inj   Side: Left   Orientation: lateral;proximal   Wound Approximate Age at First Assessment (Weeks):    Wound Number:    Is this injury device related?:    Incision Type:    Closure Method:    Wound Description (Comments):    Type:    Additional Comments:    Ankle-Brachial Index:    Pulses:    Removal Indication and Assessment:    Wound Outcome:    Wound Image   01/06/25 1000   Pressure Injury Stage U 01/06/25 1000   Dressing Appearance Dry 01/06/25 1000   Drainage Amount None 01/06/25 1000   Appearance Black;Dry 01/06/25 1000   Black (%), Wound Tissue Color 100 % 01/06/25 1000   Periwound Area Dry 01/06/25 1000   Wound Edges Irregular 01/06/25 1000   Wound Length (cm) 1 cm 01/06/25 1000   Wound Width (cm) 1 cm 01/06/25 1000   Wound Surface Area (cm^2) 1 cm^2 01/06/25 1000   Care Cleansed with:;Wound cleanser;Applied:;Povidone iodine 01/06/25 1000       Plan:        WOCN consult-68 y/o male in with pneumonia and septic.   Longterm term head injury trached-longterm-tube feeding -- and contracted--total care.    Eyes open but does not mouth words.     He has a longterm buttock ulcer  and multiple bony prominences and scabs.     He is on an ICU bed and being turned q2hrs with wedge and heels offloaded.    Care put inplace and spoke with nurse Horner.    Will visit biweekly while in hospital.

## 2025-01-06 NOTE — PROGRESS NOTES
Pharmacokinetic Initial Assessment: Tobramycin    Assessment:  Weight utilized for dose calculation: Adjusted Body Weight  Dosing method utilized: conventional dosing (not a candidate for extended interval due to severe renal impairment (CrCl <30 mL/min))    Plan: Traditional dosing regimen: Tobramycin 195 mg IV once, followed by a serum peak level scheduled 60 minutes after the end of the dose for calculation Vd (if necessary) to be drawn on 01/06 at 0000. Goal Peak level is 8-10 mcg/mL. Trough Monitoring to follow.  Trough 01/06 at 2200  Pharmacy will continue to monitor.    Please contact pharmacy at extension [phone] with any questions regarding this assessment.    Thank you for the consult,    Raven Yuan       Patient brief summary:  Delio Daniel Jr. is a 69 y.o. male initiated on aminoglycoside therapy for treatment of suspected lower respiratory infection    Drug Allergies:   Review of patient's allergies indicates:  No Known Allergies    Actual Body Weight:   65 kg    Adjust Body Weight:   65 kg    Ideal Body Weight:  66.1 kg    Renal Function:   Estimated Creatinine Clearance: 33.5 mL/min (A) (based on SCr of 1.94 mg/dL (H)).,     Dialysis Method (if applicable):  BRIAN    CBC (last 72 hours):  Recent Labs   Lab Result Units 01/05/25  1300   WBC x10(3)/mcL 14.78*   Hgb g/dL 11.3*   Hct % 36.2*   Platelet x10(3)/mcL 266       Metabolic Panel (last 72 hours):  Recent Labs   Lab Result Units 01/05/25  1300   Sodium mmol/L 145   Potassium mmol/L 3.7   Chloride mmol/L 109*   CO2 mmol/L 23   Glucose mg/dL 114   Blood Urea Nitrogen mg/dL 43.0*   Creatinine mg/dL 1.94*   Albumin g/dL 2.8*   Bilirubin Total mg/dL 1.5   ALP unit/L 97   AST unit/L 12   ALT unit/L 9       Microbiologic Results:  Microbiology Results (last 7 days)       Procedure Component Value Units Date/Time    Blood culture #2 **CANNOT BE ORDERED STAT** [[id number]]     Order Status: Sent Specimen: Blood     Blood culture #1 **CANNOT BE ORDERED STAT**  [[id number]]     Order Status: Sent Specimen: Blood

## 2025-01-06 NOTE — H&P
[facility] - [facility] ICU  Pulmonary Critical Care Note    Patient Name: Delio Daniel Jr.  MRN: [id number]  Admission Date: 1/5/2025  Hospital Length of Stay: 1 days  Code Status: Full Code  Attending Provider: Jamil Hutchins Jr., MD,*  Primary Care Provider: No primary care provider on file.     Subjective:     HPI:   69-year-old from [facility] was sent to the emergency department yesterday with tachypnea and increased lethargy.  He was found to be in AFib with RVR.  Was also hypotensive despite volume resuscitation and was placed on Levophed and amiodarone and admitted to ICU.  Patient is nonverbal with a tracheostomy and J-tube in place.  He is on a ventilator at [facility].    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  Permanent pacemaker/defibrillator and a STEMI in 2003.  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  Recent hospitalization at [facility] in November 2024 for multidrug resistant UTI and volume depletion.  He has a chronic sacral decubitus.  Also history aspiration pneumonia.        Past Medical History:   Diagnosis Date    Arthritis     Atrial fibrillation     BPH (benign prostatic hyperplasia)     Cardiac arrest     Coronary artery disease     Cyst, kidney, acquired     Diverticulosis     Hyperlipidemia     Hypertension     MI (myocardial infarction)     Obesity     Steatosis of liver     Stroke        Past Surgical History:   Procedure Laterality Date    A-V CARDIAC PACEMAKER INSERTION Right     CARDIAC CATHETERIZATION      COLONOSCOPY W/ BIOPSIES      CRANIECTOMY Right 12/20/2023    Procedure: CRANIECTOMY;  Surgeon: Artem Can MD;  Location: [facility] OR;  Service: Neurosurgery;  Laterality: Right;    ESOPHAGOGASTRODUODENOSCOPY W/ PEG N/A 1/2/2024    Procedure: PEG;  Surgeon: Tani Day MD;  Location: [facility] ENDOSCOPY;  Service:  Gastroenterology;  Laterality: N/A;    ESOPHAGOGASTRODUODENOSCOPY W/ PEG N/A 10/30/2024    Procedure: EGD w/ Jtube placement;  Surgeon: Gabriele Salcido MD;  Location: [facility] ENDOSCOPY;  Service: Endoscopy;  Laterality: N/A;  with J tube extension    excision of colon      TRACHEOSTOMY N/A 12/29/2023    Procedure: CREATION, TRACHEOSTOMY;  Surgeon: Patricia Winslow MD;  Location: [facility] OR;  Service: ENT;  Laterality: N/A;  REQ 1130 //  NEEDS 2 SCRUBS       Social History     Socioeconomic History    Marital status:     Number of children: 9   Occupational History    Occupation: retired   Tobacco Use    Smoking status: Never    Smokeless tobacco: Never   Substance and Sexual Activity    Alcohol use: Not Currently    Drug use: Not Currently    Sexual activity: Not Currently     Partners: Female     Social Drivers of Health     Financial Resource Strain: Patient Unable To Answer (1/6/2025)    Overall Financial Resource Strain (CARDIA)     Difficulty of Paying Living Expenses: Patient unable to answer   Food Insecurity: Patient Unable To Answer (1/6/2025)    Hunger Vital Sign     Worried About Running Out of Food in the Last Year: Patient unable to answer     Ran Out of Food in the Last Year: Patient unable to answer   Transportation Needs: Patient Unable To Answer (1/6/2025)    TRANSPORTATION NEEDS     Transportation : Patient unable to answer   Physical Activity: Sufficiently Active (8/5/2024)    Exercise Vital Sign     Days of Exercise per Week: 5 days     Minutes of Exercise per Session: 30 min   Stress: Patient Unable To Answer (1/6/2025)    [employer] - Occupational Stress Questionnaire     Feeling of Stress : Patient unable to answer   Housing Stability: Patient Unable To Answer (1/6/2025)    Housing Stability Vital Sign     Unable to Pay for Housing in the Last Year: Patient unable to answer     Homeless in the Last Year: Patient unable to answer           Current  Outpatient Medications   Medication Instructions    amiodarone (PACERONE) 200 mg, Per G Tube, Daily    ascorbic Acid (VITAMIN C) 500 mg, 2 times daily, Per PEG tube    busPIRone (BUSPAR) 5 mg, Per G Tube, 3 times daily    cholestyramine (QUESTRAN) 4 gram packet 4 g, Daily, Via PEG tube    cholestyramine-aspartame (QUESTRAN LIGHT) 4 gram PwPk 4 g, Per G Tube, 2 times daily    finasteride (PROSCAR) 5 mg, Per G Tube, Daily    furosemide (LASIX) 80 mg, Per G Tube, As needed (PRN)    L. acidophilus/L.bulgaricus (FLORANEX ORAL) 1 packet, PEG Tube, Daily    levetiracetam 1,500 mg, Per G Tube, 2 times daily, Nursing home reports medication hold by MD until level redraw on Monday.    LIPITOR 10 mg, Per G Tube, Daily    metoprolol tartrate (LOPRESSOR) 25 mg, Per G Tube, 2 times daily    miconazole NITRATE 2 % (MICOTIN) 2 % top powder Topical (Top), 2 times daily    multivitamin (THERAGRAN) per tablet 1 tablet, Per G Tube, Daily    polyethylene glycol (GLYCOLAX) 17 g, Per G Tube, 2 times daily PRN    protein supplement (PROMOD PROTEIN ORAL) 30 mLs, Per G Tube, Daily    QUEtiapine (SEROQUEL) 25 mg, Per G Tube, 2 times daily    scopolamine (TRANSDERM-SCOP) 1.3-1.5 mg (1 mg over 3 days) 1 patch, Transdermal, Every 3 days    sucralfate (CARAFATE) 1 g, Per G Tube, Before meals & nightly    venlafaxine 75 mg TR24 1 tablet, Per G Tube, 2 times daily       Current Inpatient Medications   atorvastatin  10 mg Per G Tube Daily    busPIRone  5 mg Per G Tube TID    famotidine (PF)  20 mg Intravenous Daily    lactated ringers  1,000 mL Intravenous Once    levetiracetam  1,500 mg Per G Tube BID    mupirocin   Nasal BID    potassium bicarbonate  25 mEq Oral Once       Current Intravenous Infusions   amiodarone in dextrose 5%  0.5 mg/min Intravenous Continuous 16.7 mL/hr at 01/06/25 0649 0.5 mg/min at 01/06/25 0649    NORepinephrine bitartrate-D5W  0-3 mcg/kg/min Intravenous Continuous 21.9 mL/hr at 01/06/25 0649 0.18 mcg/kg/min at 01/06/25  0649         Review of Systems   Unable to perform ROS: Patient nonverbal          Objective:       Intake/Output Summary (Last 24 hours) at 1/6/2025 0945  Last data filed at 1/6/2025 0649  Gross per 24 hour   Intake 1897.47 ml   Output 275 ml   Net 1622.47 ml         Vital Signs (Most Recent):  Temp: 98.6 °F (37 °C) (01/06/25 0400)  Pulse: (!) 134 (01/06/25 0815)  Resp: (!) 30 (01/06/25 0815)  BP: (!) 126/91 (01/06/25 0815)  SpO2: (!) 94 % (01/06/25 0900)  Body mass index is 22.44 kg/m².  Weight: 65 kg (143 lb 4.8 oz) Vital Signs (24h Range):  Temp:  [98.6 °F (37 °C)-101.4 °F (38.6 °C)] 98.6 °F (37 °C)  Pulse:  [] 134  Resp:  [18-37] 30  SpO2:  [82 %-100 %] 94 %  BP: ()/() 126/91     Physical Exam  General-cachectic chronically ill-appearing elderly man on mechanical ventilation  HEENT-defect in the right skull from previous craniectomy.  Conjunctiva pink sclerae nonicteric  Neck-tracheostomy in the midline  Chest-equal bilateral breath sounds  CV-irregular tachycardia  Abdomen-PEJ-tube in the upper abdomen.  Extremities contracted    Lines/Drains/Airways       Drain  Duration                  Gastrostomy/Enterostomy 10/30/24 1200 Gastrostomy-jejunostomy feeding 67 days         Urethral Catheter 01/05/25 2208 Temperature probe <1 day              Airway  Duration             Adult Surgical Airway 08/19/24 0120 Shiley Extra Large Cuffed Distal 6.0/ 75mm 140 days              Peripheral Intravenous Line  Duration                  Midline Catheter - Single Lumen 10/20/24 1530 Right 77 days         Peripheral IV - Single Lumen 01/05/25 2139 20 G Left;Posterior Hand <1 day         Peripheral IV - Single Lumen 01/05/25 2140 20 G Posterior;Right Hand <1 day         Peripheral IV - Single Lumen 01/05/25 2252 20 G Posterior;Right Forearm <1 day         Peripheral IV - Single Lumen 01/06/25 0015 18 G 2 1/4 in Yes Anterior;Right Upper Arm <1 day                    Significant Labs:    Lab Results   Component  Value Date    WBC 17.77 (H) 01/05/2025    WBC 17.77 01/05/2025    HGB 11.0 (L) 01/05/2025    HCT 35.1 (L) 01/05/2025    MCV 88.6 01/05/2025     01/05/2025           BMP  Lab Results   Component Value Date     01/06/2025    K 3.2 (L) 01/06/2025    CO2 21 (L) 01/06/2025    BUN 43.9 (H) 01/06/2025    CREATININE 2.07 (H) 01/06/2025    CALCIUM 8.4 (L) 01/06/2025    AGAP 16.0 01/06/2025    EGFRNONAA 61 04/23/2022         ABG  Recent Labs   Lab 01/06/25 0618   PH 7.560*   PO2 73.0*   PCO2 32.0*   HCO3 28.7*   POCBASEDEF 6.40*       Mechanical Ventilation Support:  Vent Mode: A/C (01/06/25 0900)  Ventilator Initiated: Yes (01/05/25 2105)  Set Rate: 30 BPM (01/06/25 0900)  Vt Set: 450 mL (01/06/25 0900)  PEEP/CPAP: 5 cmH20 (01/06/25 0900)  Oxygen Concentration (%): 30 (01/06/25 0900)  Peak Airway Pressure: 26 cmH20 (01/06/25 0900)  Total Ve: 15.5 L/m (01/06/25 0900)  F/VT Ratio<105 (RSBI): (!) 65.5 (01/06/25 0715)      Significant Imaging:  I have reviewed the pertinent imaging within the past 24 hours.  Haziness in the right lung base consistent with pneumonia.  Tracheostomy tube in the midline of trachea      Assessment/Plan:     Assessment  Aspiration pneumonia  Chronic respiratory failure  History of hemorrhagic stroke, right craniectomy, and residual left-sided weakness  Recent admit with UTI  Shock probably secondary to above along with hypovolemia  History of chronic atrial fib now with RVR likely secondary to above      Plan  Give another fluid bolus.  Continue amiodarone drip.  Rate should be better controlled with adequate volume.  We will continue vancomycin and cefepime.  Continue present mechanical ventilation.         ROBB Drake MD  Pulmonary Critical Care Medicine  [facility] - [facility] ICU  DOS: 01/06/2025

## 2025-01-06 NOTE — CONSULTS
Inpatient consult to Cardiology  Consult performed by: Eduar Contreras ANP  Consult ordered by: Jamil Hutchins Jr., MD, [facility]  Reason for consult: PAF/RVR        [facility] *    Cardiology  Consult Note    Patient Name: Delio Daniel Jr.  MRN: [id number]  Admission Date: 1/5/2025  Hospital Length of Stay: 1 days  Code Status: Full Code   Attending Provider: Jamil Hutchins Jr., MD,*   Consulting Provider: MERLIN Farmer  Primary Care Physician: No primary care provider on file.  Principal Problem:<principal problem not specified>    Patient information was obtained from past medical records and ER records.     Subjective:     Chief Complaint/Reason for Consult: PAF/RVR     HPI: Mr. Daniel is a 68 y/o male who is known to [facility], Dr. Flynn. The patient is a [facility] resident who was found to have Tachypnea and Lethargy. EMS was notified and he was found to be in A.Fib with RVR and hypotensive despite volume resuscitation. He was placed on Levophed and Amiodarone and Aspiration Pneumonitis and was Admitted to ICU for further workup and management. [facility] was consulted for PAF/RVR.    PMH: Arthritis, AF, BPH, Cardiac Arrest, CAD, Renal Cyst, Diverticulosis, Hyperlipidemia, Hypertension, MI, Obesity, Lever Steatosis, Stroke  PSH: Pacemaker, LHC, Colonoscopy, EGD, Colon Excision, Tracheostomy  Family History: Mother - HTN; Sister - HTN; Brother - HTN   Social History: Tobacco - Negative, Alcohol - Negative, Substance Abuse - Negative    Previous Cardiac Diagnostics:   Echocardiogram (10.22.24):  Left Ventricle: The left ventricle is normal in size. Mildly increased wall thickness. There is normal systolic function with a visually estimated ejection fraction of 65%. Grade I diastolic dysfunction.  Right Ventricle: Normal right ventricular cavity size. Systolic function is normal.  Aortic Valve: There is mild aortic valve sclerosis.  Mitral Valve: There is mild (1+) regurgitation.  Tricuspid Valve: There is mild  (1+) regurgitation.  The estimated pulmonary artery systolic pressure is 21 mmHg.  AICD/pacemaker lead noted.  No evidence of vegetation noted.  No evidence of valvular endocarditis noted.  If clinical suspicion is high would recommend transesophageal echocardiogram.     Echocardiogram (5.3.24):  EF 65%  RV with Normal RV Function.  MR- Mild. TR- Mild.   Pericardial Effusion- None. Anterior Fat Pad Noted.      ECHO (1.15.24):  TDS. No Definity contrast available for use. Left Ventricle: The left ventricle is normal in size. Moderately increased wall thickness. Normal wall motion. There is normal systolic function. Ejection fraction by visual approximation is 55%. Right Ventricle: Normal right ventricular cavity size. Systolic function is borderline low. Left Atrium: Left atrium is severely dilated. Right Atrium: Right atrium is mildly dilated. Mitral Valve: There is bileaflet sclerosis. There is mild regurgitation. IVC/SVC: Normal venous pressure at 3 mmHg.     Venous US LUE (12.26.23):  There was no evidence of deep vein thrombosis in the left upper extremity.   A superficial thrombosis was identified in the left cephalic vein.      Carotid US (12.19.23):  The right internal carotid artery demonstrated less than 50% stenosis.  The left internal carotid artery demonstrated less than 50% stenosis.  The bilateral vertebral arteries were patent with antegrade flow.  Bilateral internal carotid arteries demonstrated decreased velocities starting from the common carotid arteries.      LHC (5.25.18):   LM: Normal. Normal size and bifurcation. LAD: Abnormal. Large, mild atherosclerotic plaque, moderately large diagonals. Mid LAD 30% stenosis. The lesion was tubular and eccentric. LCX: abnormal and Large, mild atherosclerotic plaque, large OM with mild narrowing.OM 1 35% stenosis. RCA: normal. Large and no significant disease.     Review of patient's allergies indicates:  No Known Allergies  No current facility-administered  medications on file prior to encounter.     Current Outpatient Medications on File Prior to Encounter   Medication Sig    amiodarone (PACERONE) 200 MG Tab 1 tablet (200 mg total) by Per G Tube route once daily.    ascorbic Acid (VITAMIN C) 500 mg CpSR 500 mg 2 (two) times daily. Per PEG tube    busPIRone (BUSPAR) 5 MG Tab 5 mg by Per G Tube route 3 (three) times daily.    cholestyramine (QUESTRAN) 4 gram packet 4 g once daily. Via PEG tube    cholestyramine-aspartame (QUESTRAN LIGHT) 4 gram PwPk 1 packet (4 g total) by Per G Tube route 2 (two) times daily.    finasteride (PROSCAR) 5 mg tablet 1 tablet (5 mg total) by Per G Tube route once daily.    furosemide (LASIX) 80 MG tablet 80 mg by Per G Tube route as needed (for Edema).    L. acidophilus/L.bulgaricus (FLORANEX ORAL) 1 packet by PEG Tube route Daily.    levetiracetam 500 mg/5 mL (5 mL) Soln 1,500 mg by Per G Tube route 2 (two) times daily. Nursing home reports medication hold by MD until level redraw on Monday.    LIPITOR 10 mg tablet 10 mg by Per G Tube route once daily.    metoprolol tartrate (LOPRESSOR) 25 MG tablet 1 tablet (25 mg total) by Per G Tube route 2 (two) times daily.    miconazole NITRATE 2 % (MICOTIN) 2 % top powder Apply topically 2 (two) times daily.    multivitamin (THERAGRAN) per tablet 1 tablet by Per G Tube route once daily.    polyethylene glycol (GLYCOLAX) 17 gram PwPk 17 g by Per G Tube route 2 (two) times daily as needed for Constipation.    protein supplement (PROMOD PROTEIN ORAL) 30 mLs by Per G Tube route once daily.    QUEtiapine (SEROQUEL) 25 MG Tab 25 mg by Per G Tube route 2 (two) times daily.    scopolamine (TRANSDERM-SCOP) 1.3-1.5 mg (1 mg over 3 days) Place 1 patch onto the skin Every 3 (three) days.    sucralfate (CARAFATE) 1 gram tablet 1 tablet (1 g total) by Per G Tube route 4 (four) times daily before meals and nightly.    venlafaxine 75 mg TR24 1 tablet by Per G Tube route 2 (two) times a day.     Review of Systems    Unable to perform ROS: Acuity of condition     Objective:     Vital Signs (Most Recent):  Temp: 99.4 °F (37.4 °C) (01/06/25 1200)  Pulse: (!) 112 (01/06/25 1445)  Resp: (!) 36 (01/06/25 1445)  BP: 104/77 (01/06/25 1445)  SpO2: 100 % (01/06/25 1445) Vital Signs (24h Range):  Temp:  [98.6 °F (37 °C)-101.4 °F (38.6 °C)] 99.4 °F (37.4 °C)  Pulse:  [] 112  Resp:  [18-37] 36  SpO2:  [72 %-100 %] 100 %  BP: ()/() 104/77   Weight: 65 kg (143 lb 4.8 oz)  Body mass index is 22.44 kg/m².  SpO2: 100 %       Intake/Output Summary (Last 24 hours) at 1/6/2025 1516  Last data filed at 1/6/2025 1458  Gross per 24 hour   Intake 3234.18 ml   Output 275 ml   Net 2959.18 ml     Lines/Drains/Airways       Peripherally Inserted Central Catheter Line  Duration             PICC Triple Lumen 01/06/25 1030 right brachial <1 day              Drain  Duration                  Gastrostomy/Enterostomy 10/30/24 1200 Gastrostomy-jejunostomy feeding 68 days         Urethral Catheter 01/05/25 2208 Temperature probe <1 day              Airway  Duration             Adult Surgical Airway 08/19/24 0120 Shiley Extra Large Cuffed Distal 6.0/ 75mm 140 days              Peripheral Intravenous Line  Duration                  Midline Catheter - Single Lumen 10/20/24 1530 Right 78 days         Peripheral IV - Single Lumen 01/05/25 2139 20 G Left;Posterior Hand <1 day         Peripheral IV - Single Lumen 01/05/25 2140 20 G Posterior;Right Hand <1 day         Peripheral IV - Single Lumen 01/05/25 2252 20 G Posterior;Right Forearm <1 day         Peripheral IV - Single Lumen 01/06/25 0015 18 G 2 1/4 in Yes Anterior;Right Upper Arm <1 day                  Significant Labs:   Chemistries:   Recent Labs   Lab 01/01/25  1113 01/02/25  0415 01/05/25  1300 01/05/25  2140 01/06/25  0317    143 145 146* 138   K 3.6 3.5 3.7 3.7 3.2*    108* 109* 107 101   CO2 27 24 23 23 21*   BUN 23.5 20.0 43.0* 48.3* 43.9*   CREATININE 0.73 0.69* 1.94* 2.48*  2.07*   CALCIUM 8.8 8.6* 8.4* 9.5 8.4*   BILITOT  --   --  1.5 1.4 1.3   ALKPHOS  --   --  97 100 91   ALT  --   --  9 8 8   AST  --   --  12 17 14   GLUCOSE 103 87 114 166* 403*   MG 2.30 2.20  --   --   --    TROPONINI  --   --   --  0.011  --         CBC/Anemia Labs: Coags:    Recent Labs   Lab 01/05/25  1300 01/05/25  2140 01/06/25  0928   WBC 14.78* 17.77  17.77* 16.82*   HGB 11.3* 11.0* 10.0*   HCT 36.2* 35.1* 31.7*    271 254   MCV 91.6 88.6 89.3   RDW 16.1 16.1 15.9    Recent Labs   Lab 01/05/25  2140   INR 2.7*   APTT 41.8*        Significant Imaging:  Imaging Results              X-Ray Chest AP Portable (Final result)  Result time 01/05/25 21:33:33      Final result by Sami Taylor MD (01/05/25 21:33:33)                   Impression:      Right lower lung zone extensive infiltrates.      Electronically signed by: Sami Taylor  Date:    01/05/2025  Time:    21:33               Narrative:    EXAMINATION:  XR CHEST AP PORTABLE    CLINICAL HISTORY:  Shortness of breath    TECHNIQUE:  One view.    COMPARISON:  November 8, 2024.    FINDINGS:  Cardiopericardial silhouette is within normal limits.  Extensive new infiltrates involve the right lower lung zone.  Left lung is clear.  No pulmonary edema or fluid within the pleural spaces.  No pneumothorax.  Cardiac device electrodes are in similar position.  Tracheostomy cannula.                                    EKG:       Telemetry: PAF/RVR    Physical Exam  Constitutional:       General: He is not in acute distress.     Appearance: Normal appearance. He is ill-appearing.   HENT:      Head: Normocephalic.      Mouth/Throat:      Mouth: Mucous membranes are moist.   Cardiovascular:      Rate and Rhythm: Tachycardia present. Rhythm irregular.      Pulses: Normal pulses.      Heart sounds: Normal heart sounds. No murmur heard.  Pulmonary:      Effort: Pulmonary effort is normal. No respiratory distress.      Breath sounds: Rhonchi present.      Comments:  Ventilator Associated Breath Sounds  Vent Mode: A/C  Oxygen Concentration (%):  [30] 30  Resp Rate Total:  [30 br/min-37 br/min] 37 br/min  Vt Set:  [450 mL] 450 mL  PEEP/CPAP:  [5 cmH20] 5 cmH20  Mean Airway Pressure:  [7 tcZ58-43 cmH20] 17 cmH20  Abdominal:      Palpations: Abdomen is soft.      Comments: + J Tube   Skin:     General: Skin is warm.   Neurological:      Comments: Non-Communicative since CVA       Home Medications:   No current facility-administered medications on file prior to encounter.     Current Outpatient Medications on File Prior to Encounter   Medication Sig Dispense Refill    amiodarone (PACERONE) 200 MG Tab 1 tablet (200 mg total) by Per G Tube route once daily.      ascorbic Acid (VITAMIN C) 500 mg CpSR 500 mg 2 (two) times daily. Per PEG tube      busPIRone (BUSPAR) 5 MG Tab 5 mg by Per G Tube route 3 (three) times daily.      cholestyramine (QUESTRAN) 4 gram packet 4 g once daily. Via PEG tube      cholestyramine-aspartame (QUESTRAN LIGHT) 4 gram PwPk 1 packet (4 g total) by Per G Tube route 2 (two) times daily. 180 packet 3    finasteride (PROSCAR) 5 mg tablet 1 tablet (5 mg total) by Per G Tube route once daily.      furosemide (LASIX) 80 MG tablet 80 mg by Per G Tube route as needed (for Edema).      L. acidophilus/L.bulgaricus (FLORANEX ORAL) 1 packet by PEG Tube route Daily.      levetiracetam 500 mg/5 mL (5 mL) Soln 1,500 mg by Per G Tube route 2 (two) times daily. Nursing home reports medication hold by MD until level redraw on Monday.      LIPITOR 10 mg tablet 10 mg by Per G Tube route once daily.      metoprolol tartrate (LOPRESSOR) 25 MG tablet 1 tablet (25 mg total) by Per G Tube route 2 (two) times daily.      miconazole NITRATE 2 % (MICOTIN) 2 % top powder Apply topically 2 (two) times daily.      multivitamin (THERAGRAN) per tablet 1 tablet by Per G Tube route once daily.      polyethylene glycol (GLYCOLAX) 17 gram PwPk 17 g by Per G Tube route 2 (two) times daily as  needed for Constipation.      protein supplement (PROMOD PROTEIN ORAL) 30 mLs by Per G Tube route once daily.      QUEtiapine (SEROQUEL) 25 MG Tab 25 mg by Per G Tube route 2 (two) times daily.      scopolamine (TRANSDERM-SCOP) 1.3-1.5 mg (1 mg over 3 days) Place 1 patch onto the skin Every 3 (three) days.      sucralfate (CARAFATE) 1 gram tablet 1 tablet (1 g total) by Per G Tube route 4 (four) times daily before meals and nightly.      venlafaxine 75 mg TR24 1 tablet by Per G Tube route 2 (two) times a day.       Current Schedule Inpatient Medications:   apixaban  5 mg Oral BID    atorvastatin  10 mg Per G Tube Daily    busPIRone  5 mg Per G Tube TID    ceFEPime IV (PEDS and ADULTS)  1 g Intravenous Q8H    famotidine (PF)  20 mg Intravenous Daily    levetiracetam  1,500 mg Per G Tube BID    mupirocin   Nasal BID    neomycin-bacitracin-polymyxin   Topical (Top) Daily    vancomycin 750 mg in 0.9% NaCl 250 mL IVPB (admixture device)  750 mg Intravenous Once     Continuous Infusions:   amiodarone in dextrose 5%  0.5 mg/min Intravenous Continuous 16.7 mL/hr at 01/06/25 0649 0.5 mg/min at 01/06/25 0649    NORepinephrine bitartrate-D5W  0-3 mcg/kg/min Intravenous Continuous 4.9 mL/hr at 01/06/25 1458 0.04 mcg/kg/min at 01/06/25 1458     Assessment:   Persistent AF - Now RVR     - [id number] Score 4 (4.8% Stroke Risk per Year)     - on Xarelto for Stroke Risk Reduction as OP  Aspiration Pneumonia   Acute Hypoxemic Respiratory Failure requiring Tracheostomy/Ventilation  CAD    - [facility] (5.25.18): LM: Normal. Normal size and bifurcation. LAD: Abnormal. Large, mild atherosclerotic plaque, moderately large diagonals. Mid LAD 30% stenosis. The lesion was tubular and eccentric. LCX: abnormal and Large, mild atherosclerotic plaque, large OM with mild narrowing.OM 1 35% stenosis. RCA: normal. Large and no significant disease.      - ECHO (10.22.24) - LVEF 65%; DD I  SSS    - Dual Chamber ICD  Hypotension requiring Pressors    - Hx  of HTN   Hyperlipidemia  Leukocytosis   Anemia   History of Hemorrhagic CVA (12/2023)    - Residual Left Sided Deficits    - S/P Peg Tube and Trach   Small Left Pleural Effusion   Arthritis  Dysphagia    - s/p PEG   BRIAN/CKD   History of Seizure Disorder  Liver Steatosis  Diverticulosis  BPH  Depression  Obesity   Severe PCM   No Known History of GIB    Patient has been screened and assessed by RD.  Malnutrition Type:  Context:  chronic illness  Level: severe  Malnutrition Characteristic Summary:  Weight Loss (Malnutrition): greater than 20% in 1 year  Energy Intake (Malnutrition): other (see comments) (Unable to assess)  Subcutaneous Fat (Malnutrition): severe depletion  Muscle Mass (Malnutrition): severe depletion  Fluid Accumulation (Malnutrition): other (see comments) (Not present)    Plan:   Continue IV Amiodarone per Protocol  Given Digoxin 500mcg IVP x 1 Now  Continue Current Treatement per Primary Team  Will Continue to Follow  Labs and EKG in AM: CBC, CMP and Mg    Thank you for your consult.     Eduar Contreras, ANP  Cardiology  [facility]

## 2025-01-06 NOTE — PROGRESS NOTES
Inpatient Nutrition Assessment    Admit Date: 1/5/2025   Total duration of encounter: 1 day   Patient Age: 69 y.o.    Nutrition Recommendation/Prescription     Start tube feeding when appropriate.  Tube feeding recommendation:     Impact Peptide 1.5 goal rate 65 ml/hr to provide  1950 kcal/d  (101% est needs)  122 g protein/d (100% est needs)  1001 ml free water/d (52% est needs)  225gm CHO/d  (calculations based on estimated 20 hr/d run time)     Add Patel (provides 90 kcal, 2.5 g protein per serving) BID.    If no IV fluids running, can give 100ml q 2hr water flushes. Total water provided: 2000ml (104% est needs.)     Due to dx of malnutrition, pt at risk for refeeding syndrome. Start TF @ 20ml/hr and increase as tolerated 10ml/hr q8hr until goal rate reached.   May also need to consider additional thiamine per MD.      CBG management per MD.    May also need to consider additional vitamin and minerals per MD.  MVI 1 po daily  Vit C 500mg BID  Vit A 10,000 IU daily  Zinc sulfate 220mg Daily     Communication of Recommendations: reviewed with patient    Nutrition Assessment     Malnutrition Assessment/Nutrition-Focused Physical Exam    Malnutrition Context: chronic illness (01/06/25 0933)  Malnutrition Level: severe (01/06/25 0933)  Energy Intake (Malnutrition): other (see comments) (Unable to assess) (01/06/25 0933)  Weight Loss (Malnutrition): greater than 20% in 1 year (01/06/25 0933)  Subcutaneous Fat (Malnutrition): severe depletion (01/06/25 0933)  Orbital Region (Subcutaneous Fat Loss): severe depletion        Muscle Mass (Malnutrition): severe depletion (01/06/25 0933)  [place] (Muscle Loss): severe depletion  Clavicle Bone Region (Muscle Loss): severe depletion  Clavicle and Acromion Bone Region (Muscle Loss): severe depletion                 Fluid Accumulation (Malnutrition): other (see comments) (Not present) (01/06/25 0933)        A minimum of two characteristics is recommended for diagnosis of  either severe or non-severe malnutrition.    Chart Review    Reason Seen: continuous nutrition monitoring and malnutrition screening tool (MST)    Malnutrition Screening Tool Results   Have you recently lost weight without trying?: Unsure  Have you been eating poorly because of a decreased appetite?: Yes   MST Score: 3   Diagnosis:  Fatigue, SOB, sepsis    Relevant Medical History: CVA, trach, jtube, Afib, CAD, diverticulosis, HLD, HTN, MI, steatosis of liver    Scheduled Medications:  atorvastatin, 10 mg, Daily  busPIRone, 5 mg, TID  famotidine (PF), 20 mg, Daily  levetiracetam, 1,500 mg, BID  mupirocin, , BID  potassium bicarbonate, 25 mEq, Once    Continuous Infusions:  amiodarone in dextrose 5%, Last Rate: 0.5 mg/min (01/06/25 0649)  NORepinephrine bitartrate-D5W, Last Rate: 0.18 mcg/kg/min (01/06/25 0649)    PRN Medications:  acetaminophen, 650 mg, Q4H PRN  ondansetron, 4 mg, Q8H PRN  sodium chloride 0.9%, 10 mL, PRN  tobramycin - pharmacy to dose, , pharmacy to manage frequency    Calorie Containing IV Medications: no significant kcals from medications at this time    Recent Labs   Lab 01/01/25  1113 01/02/25  0415 01/05/25  1300 01/05/25  2140 01/06/25  0317    143 145 146* 138   K 3.6 3.5 3.7 3.7 3.2*   CALCIUM 8.8 8.6* 8.4* 9.5 8.4*   MG 2.30 2.20  --   --   --     108* 109* 107 101   CO2 27 24 23 23 21*   BUN 23.5 20.0 43.0* 48.3* 43.9*   CREATININE 0.73 0.69* 1.94* 2.48* 2.07*   EGFRNORACEVR >60 >60 37 28 34   GLUCOSE 103 87 114 166* 403*   BILITOT  --   --  1.5 1.4 1.3   ALKPHOS  --   --  97 100 91   ALT  --   --  9 8 8   AST  --   --  12 17 14   ALBUMIN  --   --  2.8* 2.8* 2.3*   WBC  --   --  14.78* 17.77  17.77*  --    HGB  --   --  11.3* 11.0*  --    HCT  --   --  36.2* 35.1*  --      Nutrition Orders:  Diet NPO      Appetite/Oral Intake: not applicable/not applicable  Factors Affecting Nutritional Intake: tracheostomy  Social Needs Impacting Access to Food: none identified pt at  "[facility]  Food/Taoism/Cultural Preferences: unable to obtain  Food Allergies: no known food allergies  Last Bowel Movement: 25  Wound(s):     Wound 25 2100 Pressure Injury Rectum #1-Tissue loss description: Full thickness     Comments    25: Pt known to RD from previous admissions. Some info pulled from previous RD admit notes since no H&P at time of RD visit. Will provide TF recommendations for when appropriate to start. Noted vomiting PTA (pt with jtube.) On Diabetisource AC @ 65 @ NH. No hx of DM and noted Glu >400. Discussed with RN. May need additional CBG coverage if CBGs elevated.     Anthropometrics    Height: 5' 7.01" (170.2 cm), Height Method: Estimated  Last Weight: 65 kg (143 lb 4.8 oz) (25 09),    BMI (Calculated): 22.4  BMI Classification: normal (BMI 18.5-24.9)        Ideal Body Weight (IBW), Male: 148.06 lb     % Ideal Body Weight, Male (lb): 96.82 %                 Usual Body Weight (UBW), k kg (EMR wt from 24)  % Usual Body Weight: 59.21  % Weight Change From Usual Weight: -40.91 %  Usual Weight Provided By: EMR weight history    Wt Readings from Last 5 Encounters:   25 65 kg (143 lb 4.8 oz)   10/21/24 69.1 kg (152 lb 5.4 oz)   24 90.7 kg (199 lb 15.3 oz)   24 117.9 kg (260 lb)   24 117.9 kg (260 lb)     Weight Change(s) Since Admission:   Wt Readings from Last 1 Encounters:   25 0931 65 kg (143 lb 4.8 oz)   25 65 kg (143 lb 4.8 oz)   Admit Weight: 65 kg (143 lb 4.8 oz) (25),      Estimated Needs    Weight Used For Calorie Calculations: 65 kg (143 lb 4.8 oz)  Energy Calorie Requirements (kcal): 1929kcal (1.4 stress factor)  Energy Need Method: [place]-St Jeor  Weight Used For Protein Calculations: 65 kg (143 lb 4.8 oz)  Protein Requirements: 117-130gm (1.8-2g/kg )  Fluid Requirements (mL): 1929ml (1ml/kcal)  CHO Requirement: 215gm (45% est kcal needs)     Enteral Nutrition     Patient not receiving enteral nutrition " at this time.    Parenteral Nutrition     Patient not receiving parenteral nutrition support at this time.    Evaluation of Received Nutrient Intake    Calories: not meeting estimated needs  Protein: not meeting estimated needs    Patient Education     Not applicable.    Nutrition Diagnosis     PES: Inadequate oral intake related to chronic illness as evidenced by trach. (new)     PES: Severe chronic disease or condition related malnutrition Related to chronic condition As Evidenced by:  - weight loss: > 20% in 1 year - muscle mass depletion: 5 areas of severe muscle loss (Pectoralis, Clavicle, Trapezius, Temporalis, Acromion) - loss of subcutaneous fat: 2 areas of severe fat loss (Buccal, Infraorbital) new    Nutrition Interventions     Intervention(s): modified composition of enteral nutrition, modified rate of enteral nutrition, and collaboration with other providers    Goal: Meet greater than 80% of nutritional needs by follow-up. (new)  Goal: Tolerate enteral feeding at goal rate by follow-up. (new)    Nutrition Goals & Monitoring     Dietitian will monitor: energy intake and enteral nutrition intake  Discharge planning: tube feeding (Impact Peptide or Pivot 1.5 @ 65ml/hr with 100ml [id number] flushes + Patel BID)  Nutrition Risk/Follow-Up: high (follow-up in 1-4 days)   Please consult if re-assessment needed sooner.

## 2025-01-06 NOTE — ED PROVIDER NOTES
Encounter Date: 1/5/2025    SCRIBE #1 NOTE: I, Vernon Roland, am scribing for, and in the presence of,  Davis Mota MD. I have scribed the following portions of the note - Other sections scribed: HPI,ROS,PE.       History     Chief Complaint   Patient presents with    Fatigue     PT has a pneumonia has been vomiting feedings up, [facility] attempted to get family to make pt a DNR status recently, they denied and wanted him brought in to be evaluated and treated. Pt has trach on ventilator      Patient is a 69-year-old male with PMHx of Afib, MI, CAD, HTN, HLD, CVA, cardiac pacemaker in place, tracheostomy in place, and craniectomy presents to ED c/o vomiting onset ~5x days. EMS reports pt is from [facility], and states [facility] attempted to get family to make pt a DNR but family declined. EMS reports pt recently diagnosed with pneumonia and has been vomiting his PEG tube. EMS reports en route, pt's rhythm was Afib RVR heart rate in the 150s, with a respiratory rate in the 30s.  Febrile on arrival    History and ROS limited due to pt being nonverbal.     Per chart review patient with previous sputum cultures that had marked resistance to multiple antibiotics, sensitive to tobramycin and gentamicin.    The history is provided by the EMS personnel. History limited by: pt nonverbal.     Review of patient's allergies indicates:  No Known Allergies  Past Medical History:   Diagnosis Date    Arthritis     Atrial fibrillation     BPH (benign prostatic hyperplasia)     Cardiac arrest     Coronary artery disease     Cyst, kidney, acquired     Diverticulosis     Hyperlipidemia     Hypertension     MI (myocardial infarction)     Obesity     Steatosis of liver     Stroke      Past Surgical History:   Procedure Laterality Date    A-V CARDIAC PACEMAKER INSERTION Right     CARDIAC CATHETERIZATION      COLONOSCOPY W/ BIOPSIES      CRANIECTOMY Right 12/20/2023    Procedure: CRANIECTOMY;  Surgeon: Artem Can MD;   Location: [facility];  Service: Neurosurgery;  Laterality: Right;    ESOPHAGOGASTRODUODENOSCOPY W/ PEG N/A 1/2/2024    Procedure: PEG;  Surgeon: Tani Day MD;  Location: [facility] ENDOSCOPY;  Service: Gastroenterology;  Laterality: N/A;    ESOPHAGOGASTRODUODENOSCOPY W/ PEG N/A 10/30/2024    Procedure: EGD w/ Jtube placement;  Surgeon: Gabriele Salcido MD;  Location: [facility] ENDOSCOPY;  Service: Endoscopy;  Laterality: N/A;  with J tube extension    excision of colon      TRACHEOSTOMY N/A 12/29/2023    Procedure: CREATION, TRACHEOSTOMY;  Surgeon: Patricia Winslow MD;  Location: [facility] OR;  Service: ENT;  Laterality: N/A;  REQ 1130 //  NEEDS 2 SCRUBS     Family History   Problem Relation Name Age of Onset    Hypertension Mother      Hypertension Father      Hypertension Sister       Social History     Tobacco Use    Smoking status: Never    Smokeless tobacco: Never   Substance Use Topics    Alcohol use: Not Currently    Drug use: Not Currently     Review of Systems   Unable to perform ROS: Patient nonverbal       Physical Exam     Initial Vitals [01/05/25 2053]   BP Pulse Resp Temp SpO2   (!) 155/69 (!) 155 (!) 30 (!) 100.9 °F (38.3 °C) 97 %      MAP       --         Physical Exam    Nursing note and vitals reviewed.  Constitutional: He is not diaphoretic. No distress.   HENT:   Head: Atraumatic.   Defect to right side of head due to previous craniectomy.    Eyes: Conjunctivae are normal.   Neck:   6.0 Shiley Trach in place.    Cardiovascular:  Normal heart sounds and intact distal pulses.           No murmur heard.  Tachycardic, irregularly irregular rhythm   Pulmonary/Chest: He is in respiratory distress. He has rales.   Abdominal: He exhibits no distension.   PEG tube in place.    Genitourinary:    Genitourinary Comments: Rectal temp 101.4     Musculoskeletal:         General: No edema.      Comments: Wound to left knee.   Sacral wound, see image,     Contractures of left upper and left lower extremity      Neurological: GCS eye subscore is 4. GCS verbal subscore is 1. GCS motor subscore is 1.   GCS 4    Skin: Skin is warm and dry. Capillary refill takes less than 2 seconds. No rash noted. No erythema.                         ED Course   Critical Care    Date/Time: 1/5/2025 9:11 PM    Performed by: Davis Mota MD  Authorized by: Davis Mota MD  Direct patient critical care time: 28 minutes  Additional history critical care time: 12 minutes  Ordering / reviewing critical care time: 10 minutes  Documentation critical care time: 15 minutes  Consulting other physicians critical care time: 5 minutes  Total critical care time (exclusive of procedural time) : 70 minutes  Critical care time was exclusive of separately billable procedures and treating other patients and teaching time.  Critical care was necessary to treat or prevent imminent or life-threatening deterioration of the following conditions: sepsis, cardiac failure, circulatory failure, CNS failure or compromise, metabolic crisis and shock.  Critical care was time spent personally by me on the following activities: development of treatment plan with patient or surrogate, discussions with consultants, interpretation of cardiac output measurements, evaluation of patient's response to treatment, examination of patient, obtaining history from patient or surrogate, ordering and performing treatments and interventions, ordering and review of laboratory studies, ordering and review of radiographic studies, pulse oximetry, re-evaluation of patient's condition, review of old charts and ventilator management.        Labs Reviewed   COMPREHENSIVE METABOLIC PANEL - Abnormal       Result Value    Sodium 146 (*)     Potassium 3.7      Chloride 107      CO2 23      Glucose 166 (*)     Blood Urea Nitrogen 48.3 (*)     Creatinine 2.48 (*)     Calcium 9.5      Protein Total 8.1 (*)     Albumin 2.8 (*)     Globulin 5.3 (*)     Albumin/Globulin Ratio 0.5 (*)     Bilirubin  Total 1.4            ALT 8      AST 17      eGFR 28      Anion Gap 16.0      BUN/Creatinine Ratio 19     APTT - Abnormal    PTT 41.8 (*)    LACTIC ACID, PLASMA - Abnormal    Lactic Acid Level 2.8 (*)    B-TYPE NATRIURETIC PEPTIDE - Abnormal    Natriuretic Peptide 253.8 (*)    PROTIME-INR - Abnormal    PT 28.9 (*)     INR 2.7 (*)    CBC WITH DIFFERENTIAL - Abnormal    WBC 17.77 (*)     RBC 3.96 (*)     Hgb 11.0 (*)     Hct 35.1 (*)     MCV 88.6      MCH 27.8      MCHC 31.3 (*)     RDW 16.1      Platelet 271      MPV 10.8 (*)     NRBC% 0.0     URINALYSIS, REFLEX TO URINE CULTURE - Abnormal    Color, UA Yellow      Appearance, UA Turbid (*)     Specific Gravity, UA 1.023      pH, UA 5.0      Protein, UA 1+ (*)     Glucose, UA Normal      Ketones, UA Negative      Blood, UA Negative      Bilirubin, UA Negative      Urobilinogen, UA 2.0 (*)     Nitrites, UA Negative      Leukocyte Esterase, UA 25 (*)     RBC, UA 0-5      WBC, UA 0-5      Bacteria, UA Occasional (*)     Squamous Epithelial Cells, UA Trace      Calcium Oxalate Crystals, UA Few (*)     Amorphous Crystal, UA Rare     MANUAL DIFFERENTIAL - Abnormal    WBC 17.77      Neutrophils % 86      Lymphs % 8      Monocytes % 6      Neutrophils Abs 15.2822 (*)     Lymphs Abs 1.4216      Monocytes Abs 1.0662      Platelets Normal      RBC Morph Normal     TROPONIN I - Normal    Troponin-I 0.011     COVID/FLU A&B PCR - Normal    Influenza A PCR Not Detected      Influenza B PCR Not Detected      SARS-CoV-2 PCR Not Detected      Narrative:     The Xpert Xpress SARS-CoV-2/FLU/RSV plus is a rapid, multiplexed real-time PCR test intended for the simultaneous qualitative detection and differentiation of SARS-CoV-2, Influenza A, Influenza B, and respiratory syncytial virus (RSV) viral RNA in either nasopharyngeal swab or nasal swab specimens.         BLOOD CULTURE [facility]   BLOOD CULTURE [facility]   CBC W/ AUTO DIFFERENTIAL    Narrative:     The following orders were created for  panel order CBC auto differential.  Procedure                               Abnormality         Status                     ---------                               -----------         ------                     CBC with Differential[[id number]]       Abnormal            Final result               Manual Differential[[id number]]         Abnormal            Final result                 Please view results for these tests on the individual orders.     EKG Readings: (Independently Interpreted)   Initial Reading: No STEMI. Rhythm: Atrial Fibrillation (with RVR). Heart Rate: 160. Ectopy: No Ectopy. Conduction: Normal. ST Segments: Normal ST Segments. T Waves: Normal. Axis: Normal.   Taken at 21:11        Imaging Results              X-Ray Chest AP Portable (Final result)  Result time 01/05/25 21:33:33      Final result by Sami Taylor MD (01/05/25 21:33:33)                   Impression:      Right lower lung zone extensive infiltrates.      Electronically signed by: Sami Taylor  Date:    01/05/2025  Time:    21:33               Narrative:    EXAMINATION:  XR CHEST AP PORTABLE    CLINICAL HISTORY:  Shortness of breath    TECHNIQUE:  One view.    COMPARISON:  November 8, 2024.    FINDINGS:  Cardiopericardial silhouette is within normal limits.  Extensive new infiltrates involve the right lower lung zone.  Left lung is clear.  No pulmonary edema or fluid within the pleural spaces.  No pneumothorax.  Cardiac device electrodes are in similar position.  Tracheostomy cannula.                                       Medications   tobramycin - pharmacy to dose (has no administration in time range)   amiodarone 360 mg/200 mL (1.8 mg/mL) infusion (1 mg/min Intravenous Verify Only 1/5/25 2457)   amiodarone 360 mg/200 mL (1.8 mg/mL) infusion (has no administration in time range)   sodium chloride 0.9% flush 10 mL (has no administration in time range)   acetaminophen suppository 650 mg (has no administration in time range)    ondansetron injection 4 mg (has no administration in time range)   famotidine (PF) injection 20 mg (has no administration in time range)   NORepinephrine 8 mg in dextrose 5% 250 mL infusion (0.2 mcg/kg/min × 65 kg Intravenous New Bag 1/5/25 2337)   lactated ringers bolus 2,000 mL (0 mLs Intravenous Stopped 1/5/25 2245)   acetaminophen 1,000 mg/100 mL (10 mg/mL) injection 1,000 mg (0 mg Intravenous Stopped 1/5/25 2210)   vancomycin 1,250 mg in D5W 250 mL IVPB (admixture device) (0 mg Intravenous Stopped 1/6/25 0231)   tobramycin (NEBCIN) 195 mg in D5W 100 mL IVPB (0 mg Intravenous Stopped 1/6/25 0416)   amiodarone in dextrose 150 mg/100 mL (1.5 mg/mL) loading dose 150 mg (0 mg Intravenous Stopped 1/5/25 2232)     Medical Decision Making  Judging by the patient's chief complaint and pertinent history, the patient has the following possible differential diagnoses, including but not limited to the following.  Some of these are deemed to be lower likelihood and some more likely based on my physical exam and history combined with possible lab work and/or imaging studies.   Please see the pertinent studies, and refer to the HPI.  Some of these diagnoses will take further evaluation to fully rule out, perhaps as an outpatient and the patient was encouraged to follow up when discharged for more comprehensive evaluation.    Viral syndrome, COVID, flu, otitis media UTI, intraabdominal infection, bacterial pharyngitis, pneumonia, sepsis, pyelonephritis, cellulitis, abscess,     Patient is a 69-year-old male who presents to emergency department for fever, tachycardia, AFib with RVR.  Patient went with infiltrate noted on chest x-ray.  Likely pneumonia.  Started on broad-spectrum antibiotics.  Blood cultures obtained.  Lactic acid obtained.  Lactic acid elevated, improved on reassessment.  Given 30 cc/kg of IV fluids.  Patient remained tachycardic, AFib with RVR started on amiodarone bolus and infusion.  Required pressors.   Patient reportedly full code.  Baseline mentation.  Baseline vent settings.  Will admit for septic shock, pneumonia.  Remains critically ill.    Problems Addressed:  Atrial fibrillation with RVR: acute illness or injury that poses a threat to life or bodily functions  Fatigue: acute illness or injury that poses a threat to life or bodily functions  Pneumonia of both lungs due to infectious organism, unspecified part of lung: acute illness or injury that poses a threat to life or bodily functions  Sepsis: acute illness or injury that poses a threat to life or bodily functions  SOB (shortness of breath): acute illness or injury that poses a threat to life or bodily functions    Amount and/or Complexity of Data Reviewed  Independent Historian: EMS     Details:  EMS reports pt is from [facility], and states [facility] attempted to get family to make pt a DNR but family declined. EMS reports pt recently diagnosed with pneumonia and has been vomiting his PEG tube feeds since onset.   External Data Reviewed: ECG and notes.  Labs: ordered. Decision-making details documented in ED Course.  Radiology: ordered and independent interpretation performed. Decision-making details documented in ED Course.  ECG/medicine tests: ordered and independent interpretation performed.     Details: Initial Reading: No STEMI. Rhythm: Atrial Fibrillation (with RVR). Heart Rate: 160. Ectopy: No Ectopy. Conduction: Normal. ST Segments: Normal ST Segments. T Waves: Normal. Axis: Normal.   Taken at 21:11    Discussion of management or test interpretation with external provider(s): Discussed with Dr. Hutchins who will admit the patient.     Risk  OTC drugs.  Prescription drug management.  Parenteral controlled substances.  Decision regarding hospitalization.  Decision not to resuscitate or to de-escalate care because of poor prognosis.  Diagnosis or treatment significantly limited by social determinants of health.            Scribe Attestation:  "  Scribe #1: I performed the above scribed service and the documentation accurately describes the services I performed. I attest to the accuracy of the note.    Attending Attestation:           Physician Attestation for Scribe:  Physician Attestation Statement for Scribe #1: I, Davis Mota MD, reviewed documentation, as scribed by Vernon Roland in my presence, and it is both accurate and complete.             ED Course as of 01/06/25 0613   Sun Jan 05, 2025   2230 Glucose, UA: Normal [RP]      ED Course User Index  [RP] Davis Mota MD               Medical Decision Making:   History:   I obtained history from: someone other than patient and EMS provider.  Old Medical Records: I decided to obtain old medical records.  Old Records Summarized: records from another hospital, records from previous admission(s) and records from clinic visits.  Clinical Tests:   Sepsis Perfusion Assessment: "I attest a sepsis perfusion exam was performed within 6 hours of sepsis, severe sepsis, or septic shock presentation, following fluid resuscitation."    Sepsis Perfusion Assessment Complete: 1/5/2025 11:55 PM               Clinical Impression:  Final diagnoses:  [R53.83] Fatigue  [R06.02] SOB (shortness of breath)  [A41.9] Sepsis  [J18.9] Pneumonia of both lungs due to infectious organism, unspecified part of lung (Primary)  [I48.91] Atrial fibrillation with RVR          ED Disposition Condition    Admit Critical                Davis Mota MD  01/06/25 0613    "

## 2025-01-06 NOTE — PROCEDURES
"Delio Daniel Jr. is a 69 y.o. male patient.    Temp: 98.6 °F (37 °C) (01/06/25 0400)  Pulse: (!) 134 (01/06/25 0815)  Resp: (!) 30 (01/06/25 0815)  BP: (!) 126/91 (01/06/25 0815)  SpO2: (!) 94 % (01/06/25 0900)  Weight: 65 kg (143 lb 4.8 oz) (01/06/25 0931)  Height: 5' 7.01" (170.2 cm) (01/06/25 0929)    PICC  Time out: Immediately prior to procedure a time out was called to verify the correct patient, procedure, equipment, support staff and site/side marked as required  Indications: med administration  Preparation: skin prepped with ChloraPrep  Skin prep agent dried: skin prep agent completely dried prior to procedure  Sterile barriers: all five maximum sterile barriers used - cap, mask, sterile gown, sterile gloves, and large sterile sheet  Hand hygiene: hand hygiene performed prior to central venous catheter insertion  Location details: right brachial  Catheter type: triple lumen  Catheter size: 5 Fr  Catheter Length: 39cm    Ultrasound guidance: yes  Needle advanced into vessel with real time Ultrasound guidance.  Guidewire confirmed in vessel.  Sterile sheath used.  Number of attempts: 1  Post-procedure: sterile dressing applied and blood return through all ports    Assessment: placement verified by x-ray          Name donn  1/6/2025    "

## 2025-01-07 LAB
ACB COMPLEX DNA BLD POS QL NAA+NON-PROBE: NOT DETECTED
ALBUMIN SERPL-MCNC: 2.2 G/DL (ref 3.4–4.8)
ALBUMIN/GLOB SERPL: 0.5 RATIO (ref 1.1–2)
ALLENS TEST BLOOD GAS (OHS): YES
ALLENS TEST BLOOD GAS (OHS): YES
ALP SERPL-CCNC: 84 UNIT/L (ref 40–150)
ALT SERPL-CCNC: 10 UNIT/L (ref 0–55)
ANION GAP SERPL CALC-SCNC: 11 MEQ/L
AST SERPL-CCNC: 14 UNIT/L (ref 5–34)
B FRAGILIS DNA BLD POS QL NAA+PROBE: NOT DETECTED
BASE EXCESS BLD CALC-SCNC: 5.1 MMOL/L (ref -2–2)
BASE EXCESS BLD CALC-SCNC: 6 MMOL/L (ref -2–2)
BASOPHILS # BLD AUTO: 0.02 X10(3)/MCL
BASOPHILS NFR BLD AUTO: 0.2 %
BILIRUB SERPL-MCNC: 1 MG/DL
BLOOD GAS SAMPLE TYPE (OHS): ABNORMAL
BLOOD GAS SAMPLE TYPE (OHS): ABNORMAL
BUN SERPL-MCNC: 27.7 MG/DL (ref 8.4–25.7)
C ALBICANS DNA BLD POS QL NAA+PROBE: NOT DETECTED
C AURIS DNA BLD POS QL NAA+NON-PROBE: NOT DETECTED
C GATTII+NEOFOR DNA CSF QL NAA+NON-PROBE: NOT DETECTED
C GLABRATA DNA BLD POS QL NAA+PROBE: NOT DETECTED
C KRUSEI DNA BLD POS QL NAA+PROBE: NOT DETECTED
C PARAP DNA BLD POS QL NAA+PROBE: NOT DETECTED
C TROPICLS DNA BLD POS QL NAA+PROBE: NOT DETECTED
CA-I BLD-SCNC: 1.15 MMOL/L (ref 1.12–1.23)
CA-I BLD-SCNC: 1.18 MMOL/L (ref 1.12–1.23)
CALCIUM SERPL-MCNC: 8.6 MG/DL (ref 8.8–10)
CHLORIDE SERPL-SCNC: 108 MMOL/L (ref 98–107)
CO2 BLDA-SCNC: 30.2 MMOL/L
CO2 BLDA-SCNC: 30.8 MMOL/L
CO2 SERPL-SCNC: 24 MMOL/L (ref 23–31)
COHGB MFR BLDA: 1.7 % (ref 0.5–1.5)
COHGB MFR BLDA: 2.1 % (ref 0.5–1.5)
COLISTIN RES MCR-1 ISLT/SPM QL: NOT DETECTED
CREAT SERPL-MCNC: 0.76 MG/DL (ref 0.72–1.25)
CREAT/UREA NIT SERPL: 36
DRAWN BY BLOOD GAS (OHS): ABNORMAL
DRAWN BY BLOOD GAS (OHS): ABNORMAL
E CLOAC COMP DNA BLD POS QL NAA+PROBE: NOT DETECTED
E COLI DNA BLD POS QL NAA+PROBE: NOT DETECTED
E FAECALIS+OTHR E SP RRNA BLD POS FISH: NOT DETECTED
E FAECIUM HSP60 BLD POS QL PROBE: NOT DETECTED
ENTEROBACTERALES DNA BLD POS NAA+N-PRB: DETECTED
EOSINOPHIL # BLD AUTO: 0.01 X10(3)/MCL (ref 0–0.9)
EOSINOPHIL NFR BLD AUTO: 0.1 %
ERYTHROCYTE [DISTWIDTH] IN BLOOD BY AUTOMATED COUNT: 15.8 % (ref 11.5–17)
ESBL CFT TO CFT-CLAV IC RTO BD POS IMP: DETECTED
GFR SERPLBLD CREATININE-BSD FMLA CKD-EPI: >60 ML/MIN/1.73/M2
GLOBULIN SER-MCNC: 4.3 GM/DL (ref 2.4–3.5)
GLUCOSE SERPL-MCNC: 97 MG/DL (ref 82–115)
GP B STREP DNA CSF QL NAA+NON-PROBE: NOT DETECTED
HAEM INFLU DNA CSF QL NAA+NON-PROBE: NOT DETECTED
HCO3 BLDA-SCNC: 29 MMOL/L (ref 22–26)
HCO3 BLDA-SCNC: 29.6 MMOL/L (ref 22–26)
HCT VFR BLD AUTO: 29 % (ref 42–52)
HGB BLD-MCNC: 9.3 G/DL (ref 14–18)
IMM GRANULOCYTES # BLD AUTO: 0.07 X10(3)/MCL (ref 0–0.04)
IMM GRANULOCYTES NFR BLD AUTO: 0.6 %
IMP CARBAPENEMASE ISLT QL IA.RAPID: NOT DETECTED
INHALED O2 CONCENTRATION: 30 %
INHALED O2 CONCENTRATION: 30 %
K OXYTOCA OMPA BLD POS QL PROBE: NOT DETECTED
KLEBSIELLA SP DNA BLD POS QL NAA+NON-PRB: DETECTED
KLEBSIELLA SP DNA BLD POS QL NAA+NON-PRB: NOT DETECTED
KPC CARBAPENEMASE ISLT QL IA.RAPID: NOT DETECTED
L MONOCYTOG DNA CSF QL NAA+NON-PROBE: NOT DETECTED
LYMPHOCYTES # BLD AUTO: 0.92 X10(3)/MCL (ref 0.6–4.6)
LYMPHOCYTES NFR BLD AUTO: 7.6 %
MCH RBC QN AUTO: 28.1 PG (ref 27–31)
MCHC RBC AUTO-ENTMCNC: 32.1 G/DL (ref 33–36)
MCV RBC AUTO: 87.6 FL (ref 80–94)
MECA+MECC NOSE QL NAA+PROBE: NOT DETECTED
MECA+MECC+MREJ ISLT/SPM QL: NOT DETECTED
MECH RR (OHS): 20 B/MIN
MECH RR (OHS): 24 B/MIN
METHGB MFR BLDA: 0.8 % (ref 0.4–1.5)
METHGB MFR BLDA: 0.8 % (ref 0.4–1.5)
MODE (OHS): AC
MONOCYTES # BLD AUTO: 0.54 X10(3)/MCL (ref 0.1–1.3)
MONOCYTES NFR BLD AUTO: 4.5 %
N MEN DNA CSF QL NAA+NON-PROBE: NOT DETECTED
NDM CARBAPENEMASE ISLT QL IA.RAPID: NOT DETECTED
NEUTROPHILS # BLD AUTO: 10.51 X10(3)/MCL (ref 2.1–9.2)
NEUTROPHILS NFR BLD AUTO: 87 %
NRBC BLD AUTO-RTO: 0 %
O2 HB BLOOD GAS (OHS): 94.3 % (ref 94–97)
O2 HB BLOOD GAS (OHS): 96.9 % (ref 94–97)
OXA-48-LIKE CRBPNASE ISLT QL IA.RAPID: NOT DETECTED
OXYGEN DEVICE BLOOD GAS (OHS): ABNORMAL
OXYGEN DEVICE BLOOD GAS (OHS): ABNORMAL
OXYHGB MFR BLDA: 9.1 G/DL (ref 12–16)
OXYHGB MFR BLDA: 9.4 G/DL (ref 12–16)
P AERUGINOSA DNA BLD POS QL NAA+PROBE: NOT DETECTED
PCO2 BLDA: 38 MMHG (ref 35–45)
PCO2 BLDA: 39 MMHG (ref 35–45)
PEEP RESPIRATORY: 5 CMH2O
PEEP RESPIRATORY: 5 CMH2O
PH BLDA: 7.48 [PH] (ref 7.35–7.45)
PH BLDA: 7.5 [PH] (ref 7.35–7.45)
PLATELET # BLD AUTO: 214 X10(3)/MCL (ref 130–400)
PMV BLD AUTO: 10.8 FL (ref 7.4–10.4)
PO2 BLDA: 67 MMHG (ref 80–100)
PO2 BLDA: 96 MMHG (ref 80–100)
POTASSIUM BLOOD GAS (OHS): 2.8 MMOL/L (ref 3.5–5)
POTASSIUM BLOOD GAS (OHS): 3.4 MMOL/L (ref 3.5–5)
POTASSIUM SERPL-SCNC: 3.1 MMOL/L (ref 3.5–5.1)
PROT SERPL-MCNC: 6.5 GM/DL (ref 5.8–7.6)
PROTEUS SP DNA BLD POS QL NAA+PROBE: NOT DETECTED
RBC # BLD AUTO: 3.31 X10(6)/MCL (ref 4.7–6.1)
S AUREUS DNA BLD POS QL NAA+PROBE: NOT DETECTED
S ENT+BONG DNA STL QL NAA+NON-PROBE: NOT DETECTED
S EPIDERMIDIS HSP60 BLD POS QL PROBE: NOT DETECTED
S LUGDUNENSIS SODA BLD POS QL PROBE: NOT DETECTED
S MALTOPH DNA BLD POS QL NAA+PROBE: NOT DETECTED
S MARCESCENS DNA BLD POS QL NAA+PROBE: NOT DETECTED
S PNEUM DNA CSF QL NAA+NON-PROBE: NOT DETECTED
S PYOGENES HSP60 BLD POS QL PROBE: NOT DETECTED
SAMPLE SITE BLOOD GAS (OHS): ABNORMAL
SAMPLE SITE BLOOD GAS (OHS): ABNORMAL
SAO2 % BLDA: 94.7 %
SAO2 % BLDA: 98 %
SODIUM BLOOD GAS (OHS): 139 MMOL/L (ref 137–145)
SODIUM BLOOD GAS (OHS): 140 MMOL/L (ref 137–145)
SODIUM SERPL-SCNC: 143 MMOL/L (ref 136–145)
SPONT+MECH VT ON VENT: 400 ML
SPONT+MECH VT ON VENT: 400 ML
STAPH SP TUF BLD POS QL PROBE: NOT DETECTED
STREPTOCOCCUS SP TUF BLD POS QL PROBE: NOT DETECTED
TOBRAMYCIN TROUGH SERPL-MCNC: 0.6 UG/ML (ref 0.3–2)
VAN(A+B+C1+C2) GENES ISLT/SPM: NOT DETECTED
VANCOMYCIN SERPL-MCNC: 7.7 UG/ML (ref 15–20)
VIM CARBAPENEMASE ISLT QL IA.RAPID: NOT DETECTED
WBC # BLD AUTO: 12.07 X10(3)/MCL (ref 4.5–11.5)

## 2025-01-07 PROCEDURE — 36415 COLL VENOUS BLD VENIPUNCTURE: CPT | Performed by: INTERNAL MEDICINE

## 2025-01-07 PROCEDURE — 99900022

## 2025-01-07 PROCEDURE — 80053 COMPREHEN METABOLIC PANEL: CPT | Performed by: STUDENT IN AN ORGANIZED HEALTH CARE EDUCATION/TRAINING PROGRAM

## 2025-01-07 PROCEDURE — 63600175 PHARM REV CODE 636 W HCPCS: Performed by: INTERNAL MEDICINE

## 2025-01-07 PROCEDURE — 85025 COMPLETE CBC W/AUTO DIFF WBC: CPT

## 2025-01-07 PROCEDURE — 27200966 HC CLOSED SUCTION SYSTEM

## 2025-01-07 PROCEDURE — 25000003 PHARM REV CODE 250: Performed by: STUDENT IN AN ORGANIZED HEALTH CARE EDUCATION/TRAINING PROGRAM

## 2025-01-07 PROCEDURE — 80202 ASSAY OF VANCOMYCIN: CPT | Performed by: INTERNAL MEDICINE

## 2025-01-07 PROCEDURE — 87040 BLOOD CULTURE FOR BACTERIA: CPT

## 2025-01-07 PROCEDURE — 20000000 HC ICU ROOM

## 2025-01-07 PROCEDURE — 27100171 HC OXYGEN HIGH FLOW UP TO 24 HOURS

## 2025-01-07 PROCEDURE — 94761 N-INVAS EAR/PLS OXIMETRY MLT: CPT | Mod: XB

## 2025-01-07 PROCEDURE — 25000003 PHARM REV CODE 250

## 2025-01-07 PROCEDURE — 63600175 PHARM REV CODE 636 W HCPCS

## 2025-01-07 PROCEDURE — 99900035 HC TECH TIME PER 15 MIN (STAT)

## 2025-01-07 PROCEDURE — 94760 N-INVAS EAR/PLS OXIMETRY 1: CPT

## 2025-01-07 PROCEDURE — 80200 ASSAY OF TOBRAMYCIN: CPT | Performed by: INTERNAL MEDICINE

## 2025-01-07 PROCEDURE — 63600175 PHARM REV CODE 636 W HCPCS: Performed by: STUDENT IN AN ORGANIZED HEALTH CARE EDUCATION/TRAINING PROGRAM

## 2025-01-07 PROCEDURE — 82803 BLOOD GASES ANY COMBINATION: CPT

## 2025-01-07 PROCEDURE — 25000003 PHARM REV CODE 250: Performed by: NURSE PRACTITIONER

## 2025-01-07 PROCEDURE — 99900031 HC PATIENT EDUCATION (STAT)

## 2025-01-07 PROCEDURE — 36600 WITHDRAWAL OF ARTERIAL BLOOD: CPT

## 2025-01-07 PROCEDURE — 36415 COLL VENOUS BLD VENIPUNCTURE: CPT

## 2025-01-07 PROCEDURE — 99900026 HC AIRWAY MAINTENANCE (STAT)

## 2025-01-07 PROCEDURE — 94003 VENT MGMT INPAT SUBQ DAY: CPT

## 2025-01-07 RX ORDER — HYDROMORPHONE HYDROCHLORIDE 2 MG/ML
0.5 INJECTION, SOLUTION INTRAMUSCULAR; INTRAVENOUS; SUBCUTANEOUS EVERY 6 HOURS PRN
Status: DISCONTINUED | OUTPATIENT
Start: 2025-01-07 | End: 2025-01-10 | Stop reason: HOSPADM

## 2025-01-07 RX ORDER — AMIODARONE HYDROCHLORIDE 200 MG/1
400 TABLET ORAL 2 TIMES DAILY
Status: COMPLETED | OUTPATIENT
Start: 2025-01-07 | End: 2025-01-09

## 2025-01-07 RX ORDER — AMIODARONE HYDROCHLORIDE 200 MG/1
200 TABLET ORAL 2 TIMES DAILY
Status: DISCONTINUED | OUTPATIENT
Start: 2025-01-10 | End: 2025-01-10 | Stop reason: HOSPADM

## 2025-01-07 RX ORDER — POTASSIUM CHLORIDE 14.9 MG/ML
20 INJECTION INTRAVENOUS
Status: COMPLETED | OUTPATIENT
Start: 2025-01-07 | End: 2025-01-07

## 2025-01-07 RX ORDER — AMIODARONE HYDROCHLORIDE 200 MG/1
200 TABLET ORAL DAILY
Status: DISCONTINUED | OUTPATIENT
Start: 2025-01-13 | End: 2025-01-10 | Stop reason: HOSPADM

## 2025-01-07 RX ADMIN — HYDROMORPHONE HYDROCHLORIDE 0.5 MG: 2 INJECTION INTRAMUSCULAR; INTRAVENOUS; SUBCUTANEOUS at 12:01

## 2025-01-07 RX ADMIN — HYDROMORPHONE HYDROCHLORIDE 0.5 MG: 2 INJECTION INTRAMUSCULAR; INTRAVENOUS; SUBCUTANEOUS at 02:01

## 2025-01-07 RX ADMIN — BACITRACIN ZINC, NEOMYCIN, POLYMYXIN B: 400; 3.5; 5 OINTMENT TOPICAL at 08:01

## 2025-01-07 RX ADMIN — FAMOTIDINE 20 MG: 10 INJECTION, SOLUTION INTRAVENOUS at 08:01

## 2025-01-07 RX ADMIN — APIXABAN 5 MG: 5 TABLET, FILM COATED ORAL at 08:01

## 2025-01-07 RX ADMIN — MUPIROCIN: 20 OINTMENT TOPICAL at 08:01

## 2025-01-07 RX ADMIN — AMIODARONE HYDROCHLORIDE 400 MG: 200 TABLET ORAL at 11:01

## 2025-01-07 RX ADMIN — LEVETIRACETAM 1500 MG: 100 SOLUTION ORAL at 08:01

## 2025-01-07 RX ADMIN — CEFEPIME 1 G: 1 INJECTION, POWDER, FOR SOLUTION INTRAMUSCULAR; INTRAVENOUS at 04:01

## 2025-01-07 RX ADMIN — MEROPENEM 2 G: 1 INJECTION, POWDER, FOR SOLUTION INTRAVENOUS at 11:01

## 2025-01-07 RX ADMIN — AMIODARONE HYDROCHLORIDE 0.5 MG/MIN: 1.8 INJECTION, SOLUTION INTRAVENOUS at 05:01

## 2025-01-07 RX ADMIN — MUPIROCIN: 20 OINTMENT TOPICAL at 09:01

## 2025-01-07 RX ADMIN — ATORVASTATIN CALCIUM 10 MG: 10 TABLET, FILM COATED ORAL at 08:01

## 2025-01-07 RX ADMIN — BUSPIRONE HYDROCHLORIDE 5 MG: 5 TABLET ORAL at 08:01

## 2025-01-07 RX ADMIN — LEVETIRACETAM 1500 MG: 100 SOLUTION ORAL at 09:01

## 2025-01-07 RX ADMIN — AMIODARONE HYDROCHLORIDE 400 MG: 200 TABLET ORAL at 09:01

## 2025-01-07 RX ADMIN — BUSPIRONE HYDROCHLORIDE 5 MG: 5 TABLET ORAL at 09:01

## 2025-01-07 RX ADMIN — POTASSIUM CHLORIDE 20 MEQ: 14.9 INJECTION, SOLUTION INTRAVENOUS at 12:01

## 2025-01-07 RX ADMIN — APIXABAN 5 MG: 5 TABLET, FILM COATED ORAL at 09:01

## 2025-01-07 RX ADMIN — POTASSIUM CHLORIDE 20 MEQ: 14.9 INJECTION, SOLUTION INTRAVENOUS at 11:01

## 2025-01-07 RX ADMIN — CEFEPIME 1 G: 1 INJECTION, POWDER, FOR SOLUTION INTRAMUSCULAR; INTRAVENOUS at 11:01

## 2025-01-07 RX ADMIN — BUSPIRONE HYDROCHLORIDE 5 MG: 5 TABLET ORAL at 02:01

## 2025-01-07 NOTE — CONSULTS
INFECTIOUS DISEASE CONSULTATION NOTE     Patient Name: Delio Daniel Jr.  Patient : 1955  Age/Sex: 69 y.o. male  Room/Bed: IE21/IE21 A  Admission Date/Time: 2025  8:57 PM  Date of consultation:  2025  Referring MD: KODI Drake MD     Date: 2025  Time: 12:57 PM    Reason for consultation:      ESBL Klebsiella pneumoniae bacteremia    Chief complain:      Fatigue/vomiting     History of present illness:      Delio Daniel Jr. is a 69 y.o. male with a history significant for CAD, hemorrhagic stroke status post right craniotomy in 2023 hypertension, atrial fibrillation status post defibrillator, chronic sacral ulcers, tracheostomy who was admitted on 2025 with vomiting for the last 3 days.  Upon presenting to the ED, patient's vital signs showed temperature 100.9° F, heart rate 155, blood pressure 155/69.  Laboratory workup showed sodium 146, creatinine 2.48, WBC 17.7.  While in the ED, patient's blood pressure dropped and he was started on Levophed.  He was admitted to the ICU.  He Was diagnosed with AFib with RVR  Urinalysis showed 0-5 WBC.  He had 2 s.  ets of blood culture obtained which are growing ESBL Klebsiella pneumoniae.  He had chest x-ray which showed extensive new infiltrate involving the right lower lung zone.  Patient was initially started on cefepime/tobramycin.  He was switched to meropenem this morning.    Review of system:      Unable to review due to patient's condition    Antibiotics Received:     Cefepime -   Tobramycin /-  Meropenem -    Social history:      Social History     Socioeconomic History    Marital status:     Number of children: 9   Occupational History    Occupation: retired   Tobacco Use    Smoking status: Never    Smokeless tobacco: Never   Substance and Sexual Activity    Alcohol use: Not Currently    Drug use: Not Currently    Sexual activity: Not Currently     Partners: Female     Social Drivers of Health     Financial  Resource Strain: Patient Unable To Answer (1/6/2025)    Overall Financial Resource Strain (CARDIA)     Difficulty of Paying Living Expenses: Patient unable to answer   Food Insecurity: Patient Unable To Answer (1/6/2025)    Hunger Vital Sign     Worried About Running Out of Food in the Last Year: Patient unable to answer     Ran Out of Food in the Last Year: Patient unable to answer   Transportation Needs: Patient Unable To Answer (1/6/2025)    TRANSPORTATION NEEDS     Transportation : Patient unable to answer   Physical Activity: Sufficiently Active (8/5/2024)    Exercise Vital Sign     Days of Exercise per Week: 5 days     Minutes of Exercise per Session: 30 min   Stress: Patient Unable To Answer (1/6/2025)    [employer] - Occupational Stress Questionnaire     Feeling of Stress : Patient unable to answer   Housing Stability: Patient Unable To Answer (1/6/2025)    Housing Stability Vital Sign     Unable to Pay for Housing in the Last Year: Patient unable to answer     Homeless in the Last Year: Patient unable to answer       Past medical history:     Past Medical History:   Diagnosis Date    Arthritis     Atrial fibrillation     BPH (benign prostatic hyperplasia)     Cardiac arrest     Coronary artery disease     Cyst, kidney, acquired     Diverticulosis     Hyperlipidemia     Hypertension     MI (myocardial infarction)     Obesity     Steatosis of liver     Stroke        Past Surgical history:     Past Surgical History:   Procedure Laterality Date    A-V CARDIAC PACEMAKER INSERTION Right     CARDIAC CATHETERIZATION      COLONOSCOPY W/ BIOPSIES      CRANIECTOMY Right 12/20/2023    Procedure: CRANIECTOMY;  Surgeon: Artem Can MD;  Location: [facility] OR;  Service: Neurosurgery;  Laterality: Right;    ESOPHAGOGASTRODUODENOSCOPY W/ PEG N/A 1/2/2024    Procedure: PEG;  Surgeon: Tani Day MD;  Location: [facility] ENDOSCOPY;  Service: Gastroenterology;  Laterality: N/A;     ESOPHAGOGASTRODUODENOSCOPY W/ PEG N/A 10/30/2024    Procedure: EGD w/ Jtube placement;  Surgeon: Gabriele Salcido MD;  Location: [facility] ENDOSCOPY;  Service: Endoscopy;  Laterality: N/A;  with J tube extension    excision of colon      TRACHEOSTOMY N/A 2023    Procedure: CREATION, TRACHEOSTOMY;  Surgeon: Patricia Winslow MD;  Location: [facility] OR;  Service: ENT;  Laterality: N/A;  REQ 1130 //  NEEDS 2 SCRUBS       Family history:     Family History   Problem Relation Name Age of Onset    Hypertension Mother      Hypertension Father      Hypertension Sister         Allergy history:    Review of patient's allergies indicates:  No Known Allergies    Objective:    Vital signs:  Vitals:    25 0530 25 0600 25 0800 25 1203   BP: 124/82 100/75     BP Location:       Patient Position:       Pulse: 105 89     Resp: (!) 30 (!) 30  (!) 28   Temp:   98.5 °F (36.9 °C)    TempSrc:   Oral    SpO2: 100% 98%     Weight:       Height:         Temp (24hrs), Av.6 °F (37 °C), Min:98.3 °F (36.8 °C), Max:99.2 °F (37.3 °C)    Physical examination:  GEN:  Nonverbal at baseline  EYES: EOMI, no scleral icterus  HENT:  Status post craniotomy  NECK: Supple, no cervical lymphadenopathy or meningismus.  Trach in place  CARDIO:  Irregular rhythm  PULM/CHEST: CTAB. No increased work of breathing  ABD: Normal bowel sounds. Soft, not tender or distended. No HSM appreciated.  Peg tube in place  MSK: no obvious effusion, swelling, increased warmth, or erythema of major joints. No pedal edema.  Sacral wound noted or signs active infection  SKIN: No rashes. No stigmata of endocarditis.  NEURO:  Nonverbal at baseline            Diagnostic data:     CBC  Recent Labs   Lab 25  1300 25  2140 25  0928 25  0258   WBC 14.78* 17.77  17.77* 16.82* 12.07*   HGB 11.3* 11.0* 10.0* 9.3*    271 254 214   INR  --  2.7*  --   --        BMP  Recent Labs   Lab 25  1113 25  0415 25  1300  01/05/25  2140 01/06/25  0317 01/06/25  0618 01/07/25  0258 01/07/25  1057    143 145 146* 138  --  143  --    K 3.6 3.5 3.7 3.7 3.2*  --  3.1*  --     108* 109* 107 101  --  108*  --    CO2 27 24 23 23 21*  --  24  --    BUN 23.5 20.0 43.0* 48.3* 43.9*  --  27.7*  --    CREATININE 0.73 0.69* 1.94* 2.48* 2.07*  --  0.76  --    CALCIUM 8.8 8.6* 8.4* 9.5 8.4*  --  8.6*  --    CAION  --   --   --   --   --  1.10*  --  1.15   MG 2.30 2.20  --   --   --   --   --   --        Results for orders placed during the hospital encounter of 01/05/25    X-Ray Chest AP Portable    Narrative  EXAMINATION:  XR CHEST AP PORTABLE    CLINICAL HISTORY:  Shortness of breath    TECHNIQUE:  One view.    COMPARISON:  November 8, 2024.    FINDINGS:  Cardiopericardial silhouette is within normal limits.  Extensive new infiltrates involve the right lower lung zone.  Left lung is clear.  No pulmonary edema or fluid within the pleural spaces.  No pneumothorax.  Cardiac device electrodes are in similar position.  Tracheostomy cannula.    Impression  Right lower lung zone extensive infiltrates.      Electronically signed by: Sami Taylor  Date:    01/05/2025  Time:    21:33    Recent Labs   Lab 01/05/25 2140 01/06/25  0928 01/07/25  0258   WBC 17.77  17.77* 16.82* 12.07*   HGB 11.0* 10.0* 9.3*   HCT 35.1* 31.7* 29.0*    254 214     Estimated Creatinine Clearance: 85.5 mL/min (based on SCr of 0.76 mg/dL).    Lab Results   Component Value Date    CREATININE 0.76 01/07/2025    CREATININE 2.07 (H) 01/06/2025    CREATININE 2.48 (H) 01/05/2025    ALKPHOS 84 01/07/2025    ALKPHOS 91 01/06/2025    ALKPHOS 100 01/05/2025       Lab Results   Component Value Date    CRP 5.9 (H) 07/15/2020    CRP 3.1 (H) 07/13/2020    CRP 2.7 (H) 07/12/2020      Medications   Inpatient  Scheduled Meds:   amiodarone  400 mg Oral BID    Followed by    [START ON 1/10/2025] amiodarone  200 mg Oral BID    Followed by    [START ON 1/13/2025] amiodarone  200 mg  Oral Daily    apixaban  5 mg Oral BID    atorvastatin  10 mg Per G Tube Daily    busPIRone  5 mg Per G Tube TID    famotidine (PF)  20 mg Intravenous Daily    levetiracetam  1,500 mg Per G Tube BID    meropenem IV (PEDS and ADULTS)  2 g Intravenous Q8H    mupirocin   Nasal BID    neomycin-bacitracin-polymyxin   Topical (Top) Daily    tobramycin IV (PEDS and ADULTS)  7 mg/kg (Adjusted) Intravenous Once     Continuous Infusions:   NORepinephrine bitartrate-D5W  0-3 mcg/kg/min Intravenous Continuous 4.9 mL/hr at 01/06/25 1458 0.04 mcg/kg/min at 01/06/25 1458     PRN Meds:.  Current Facility-Administered Medications:     acetaminophen, 650 mg, Rectal, Q4H PRN    HYDROmorphone, 0.5 mg, Intravenous, Q6H PRN    ondansetron, 4 mg, Intravenous, Q8H PRN    sodium chloride 0.9%, 10 mL, Intravenous, PRN    Flushing PICC/Midline Protocol, , , Until Discontinued **AND** sodium chloride 0.9%, 10 mL, Intravenous, Q12H PRN    tobramycin - pharmacy to dose, , Intravenous, pharmacy to manage frequency    vancomycin - pharmacy to dose, , Intravenous, pharmacy to manage frequency    Home Meds  Current Outpatient Medications   Medication Instructions    amiodarone (PACERONE) 200 mg, Per G Tube, Daily    ascorbic Acid (VITAMIN C) 500 mg, 2 times daily, Per PEG tube    busPIRone (BUSPAR) 5 mg, Per G Tube, 3 times daily    cholestyramine (QUESTRAN) 4 gram packet 4 g, Daily, Via PEG tube    cholestyramine-aspartame (QUESTRAN LIGHT) 4 gram PwPk 4 g, Per G Tube, 2 times daily    finasteride (PROSCAR) 5 mg, Per G Tube, Daily    furosemide (LASIX) 80 mg, Per G Tube, As needed (PRN)    L. acidophilus/L.bulgaricus (FLORANEX ORAL) 1 packet, PEG Tube, Daily    levetiracetam 1,500 mg, Per G Tube, 2 times daily, Nursing home reports medication hold by MD until level redraw on Monday.    LIPITOR 10 mg, Per G Tube, Daily    metoprolol tartrate (LOPRESSOR) 25 mg, Per G Tube, 2 times daily    miconazole NITRATE 2 % (MICOTIN) 2 % top powder Topical (Top),  2 times daily    multivitamin (THERAGRAN) per tablet 1 tablet, Per G Tube, Daily    polyethylene glycol (GLYCOLAX) 17 g, Per G Tube, 2 times daily PRN    protein supplement (PROMOD PROTEIN ORAL) 30 mLs, Per G Tube, Daily    QUEtiapine (SEROQUEL) 25 mg, Per G Tube, 2 times daily    scopolamine (TRANSDERM-SCOP) 1.3-1.5 mg (1 mg over 3 days) 1 patch, Transdermal, Every 3 days    sucralfate (CARAFATE) 1 g, Per G Tube, Before meals & nightly    venlafaxine 75 mg TR24 1 tablet, Per G Tube, 2 times daily       Diagnostic studies:      X-Ray Chest 1 View for Line/Tube Placement   Final Result      Right PICC tip obscured by AICD leads, but the PICC is visualized as far as the upper SVC.         Electronically signed by: Polo Daniel   Date:    01/06/2025   Time:    11:05      X-Ray Chest 1 View   Final Result      Little change in right lower lung consolidation.         Electronically signed by: Polo Daniel   Date:    01/06/2025   Time:    07:38      X-Ray Chest AP Portable   Final Result      Right lower lung zone extensive infiltrates.         Electronically signed by: Sami Taylor   Date:    01/05/2025   Time:    21:33          Assessment and Plan:     Delio Daniel Jr. is a 69 y.o. male with:  Septic shock secondary to GNR bacteremia:  Off pressors as of 1/7  ESBL Klebsiella pneumoniae bacteremia:  Source unclear.  UA showed no pyuria.  Sacrum also does not look overly infected.  Possible pulmonary source given the pneumonia and previous positive sputum culture for Klebsiella pneumoniae  Right lower lung consolidation.   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  He is currently off pressors even before starting carbapenem therapy.  His previous ESBL Klebsiella isolated was sensitive to cefepime and intermediate to tobramycin.  No need for double coverage at this time so we will discontinue tobramycin and continue with meropenem alone.  Patient's long-term prognosis very poor and almost certain that he is at risk of recurrent infection with resistant organisms. I strongly encouraged continuing with goal of care conversation with the family.    Recommendations:     Discontinue tobramycin  Continue IV meropenem same dose  Pending final blood culture results  Okay to keep PICC line which was placed for pressors as patient would require a course of IV antibiotic upon discharge, likely ertapenem  Anticipating 10-14 days total    The antibiotics being administered require intensive monitoring for drug toxicity    Thank you for allowing us to contribute to this patient's care. Please call ID with any questions.     Danna Rivas MD  Attending, Infectious Disease     1/7/2025 12:57 PM

## 2025-01-07 NOTE — PROGRESS NOTES
Pharmacokinetic Initial Assessment: Tobramycin    Assessment:  Weight utilized for dose calculation: Actual Body Weight  Dosing method utilized: extended interval dosing    Plan: Extended interval dosing regimen: Tobramycin 7mg/kg = 455 mg IV once, followed by a random level to be drawn on 01/07 at 1530, 8-12 hours after the first dose.    Pharmacy will continue to monitor.    Please contact pharmacy at extension [phone] with any questions regarding this assessment.    Thank you for the consult,    Mikhail Hurtado       Patient brief summary:  Delio Daniel Jr. is a 69 y.o. male initiated on aminoglycoside therapy for treatment of suspected lower respiratory infection    Drug Allergies:   Review of patient's allergies indicates:  No Known Allergies    Actual Body Weight:   65kg    Adjust Body Weight:   65kg    Ideal Body Weight:  66.1kg    Renal Function:   Estimated Creatinine Clearance: 85.5 mL/min (based on SCr of 0.76 mg/dL).,     Dialysis Method (if applicable):  N/A    CBC (last 72 hours):  Recent Labs   Lab Result Units 01/05/25  1300 01/05/25  2140 01/06/25  0928 01/07/25  0258   WBC x10(3)/mcL 14.78* 17.77  17.77* 16.82* 12.07*   Hgb g/dL 11.3* 11.0* 10.0* 9.3*   Hct % 36.2* 35.1* 31.7* 29.0*   Platelet x10(3)/mcL 266 271 254 214   Mono % %  --   --  5.2 4.5   Monocytes % %  --  6  --   --    Eos % %  --   --  0.0 0.1   Basophil % %  --   --  0.5 0.2       Metabolic Panel (last 72 hours):  Recent Labs   Lab Result Units 01/05/25  1300 01/05/25  2140 01/05/25  2204 01/06/25  0317 01/06/25  0618 01/07/25  0258   Sodium mmol/L 145 146*  --  138  --  143   Sodium, Blood Gas mmol/L  --   --   --   --  139  --    Potassium mmol/L 3.7 3.7  --  3.2*  --  3.1*   Potassium, Blood Gas mmol/L  --   --   --   --  3.2*  --    Chloride mmol/L 109* 107  --  101  --  108*   CO2 mmol/L 23 23  --  21*  --  24   Glucose mg/dL 114 166*  --  403*  --  97   Glucose, UA   --   --  Normal  --   --   --    Blood Urea Nitrogen mg/dL 43.0*  48.3*  --  43.9*  --  27.7*   Creatinine mg/dL 1.94* 2.48*  --  2.07*  --  0.76   Albumin g/dL 2.8* 2.8*  --  2.3*  --  2.2*   Bilirubin Total mg/dL 1.5 1.4  --  1.3  --  1.0   ALP unit/L 97 100  --  91  --  84   AST unit/L 12 17  --  14  --  14   ALT unit/L 9 8  --  8  --  10       Microbiologic Results:  Microbiology Results (last 7 days)       Procedure Component Value Units Date/Time    Blood culture #2 **CANNOT BE ORDERED STAT** [[id number]]  (Normal) Collected: 01/05/25 2140    Order Status: Completed Specimen: Blood from Hand, Left Updated: 01/06/25 2201     Blood Culture No Growth At 24 Hours    Blood culture #1 **CANNOT BE ORDERED STAT** [[id number]]  (Normal) Collected: 01/05/25 2140    Order Status: Completed Specimen: Blood from Hand, Right Updated: 01/06/25 2201     Blood Culture No Growth At 24 Hours

## 2025-01-07 NOTE — PROGRESS NOTES
[facility] - [facility] ICU  Pulmonary Critical Care Note    Patient Name: Delio Daniel Jr.  MRN: [id number]  Admission Date: 1/5/2025  Hospital Length of Stay: 2 days  Code Status: Full Code  Attending Provider: Jamil Hutchins Jr., MD,*  Primary Care Provider: No primary care provider on file.     Subjective:     HPI:   69-year-old from [facility] was sent to the emergency department yesterday with tachypnea and increased lethargy.  He was found to be in AFib with RVR.  Was also hypotensive despite volume resuscitation and was placed on Levophed and amiodarone and admitted to ICU.  Patient is nonverbal with a tracheostomy and J-tube in place.  He is on a ventilator at [facility].    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  Permanent pacemaker/defibrillator and a STEMI in 2003.  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  Recent hospitalization at [facility] in November 2024 for multidrug resistant UTI and volume depletion.  He has a chronic sacral decubitus.  Also has history of aspiration pneumonia.    Today:  Patient remains minimally responsive.  Respirations 30 per the ventilator.  Heart rate better controlled after digoxin yesterday.  Remains on amiodarone drip.  Now off Levophed.        Past Medical History:   Diagnosis Date    Arthritis     Atrial fibrillation     BPH (benign prostatic hyperplasia)     Cardiac arrest     Coronary artery disease     Cyst, kidney, acquired     Diverticulosis     Hyperlipidemia     Hypertension     MI (myocardial infarction)     Obesity     Steatosis of liver     Stroke        Past Surgical History:   Procedure Laterality Date    A-V CARDIAC PACEMAKER INSERTION Right     CARDIAC CATHETERIZATION      COLONOSCOPY W/ BIOPSIES      CRANIECTOMY Right 12/20/2023    Procedure: CRANIECTOMY;  Surgeon: Artem Can MD;  Location: [facility] OR;   Service: Neurosurgery;  Laterality: Right;    ESOPHAGOGASTRODUODENOSCOPY W/ PEG N/A 1/2/2024    Procedure: PEG;  Surgeon: Tani Day MD;  Location: [facility] ENDOSCOPY;  Service: Gastroenterology;  Laterality: N/A;    ESOPHAGOGASTRODUODENOSCOPY W/ PEG N/A 10/30/2024    Procedure: EGD w/ Jtube placement;  Surgeon: Gabriele Salcido MD;  Location: [facility] ENDOSCOPY;  Service: Endoscopy;  Laterality: N/A;  with J tube extension    excision of colon      TRACHEOSTOMY N/A 12/29/2023    Procedure: CREATION, TRACHEOSTOMY;  Surgeon: Patricia Winslow MD;  Location: [facility] OR;  Service: ENT;  Laterality: N/A;  REQ 1130 //  NEEDS 2 SCRUBS       Social History     Socioeconomic History    Marital status:     Number of children: 9   Occupational History    Occupation: retired   Tobacco Use    Smoking status: Never    Smokeless tobacco: Never   Substance and Sexual Activity    Alcohol use: Not Currently    Drug use: Not Currently    Sexual activity: Not Currently     Partners: Female     Social Drivers of Health     Financial Resource Strain: Patient Unable To Answer (1/6/2025)    Overall Financial Resource Strain (CARDIA)     Difficulty of Paying Living Expenses: Patient unable to answer   Food Insecurity: Patient Unable To Answer (1/6/2025)    Hunger Vital Sign     Worried About Running Out of Food in the Last Year: Patient unable to answer     Ran Out of Food in the Last Year: Patient unable to answer   Transportation Needs: Patient Unable To Answer (1/6/2025)    TRANSPORTATION NEEDS     Transportation : Patient unable to answer   Physical Activity: Sufficiently Active (8/5/2024)    Exercise Vital Sign     Days of Exercise per Week: 5 days     Minutes of Exercise per Session: 30 min   Stress: Patient Unable To Answer (1/6/2025)    [employer] - Occupational Stress Questionnaire     Feeling of Stress : Patient unable to answer   Housing Stability: Patient Unable To Answer (1/6/2025)     Housing Stability Vital Sign     Unable to Pay for Housing in the Last Year: Patient unable to answer     Homeless in the Last Year: Patient unable to answer           Current Outpatient Medications   Medication Instructions    amiodarone (PACERONE) 200 mg, Per G Tube, Daily    ascorbic Acid (VITAMIN C) 500 mg, 2 times daily, Per PEG tube    busPIRone (BUSPAR) 5 mg, Per G Tube, 3 times daily    cholestyramine (QUESTRAN) 4 gram packet 4 g, Daily, Via PEG tube    cholestyramine-aspartame (QUESTRAN LIGHT) 4 gram PwPk 4 g, Per G Tube, 2 times daily    finasteride (PROSCAR) 5 mg, Per G Tube, Daily    furosemide (LASIX) 80 mg, Per G Tube, As needed (PRN)    L. acidophilus/L.bulgaricus (FLORANEX ORAL) 1 packet, PEG Tube, Daily    levetiracetam 1,500 mg, Per G Tube, 2 times daily, Nursing home reports medication hold by MD until level redraw on Monday.    LIPITOR 10 mg, Per G Tube, Daily    metoprolol tartrate (LOPRESSOR) 25 mg, Per G Tube, 2 times daily    miconazole NITRATE 2 % (MICOTIN) 2 % top powder Topical (Top), 2 times daily    multivitamin (THERAGRAN) per tablet 1 tablet, Per G Tube, Daily    polyethylene glycol (GLYCOLAX) 17 g, Per G Tube, 2 times daily PRN    protein supplement (PROMOD PROTEIN ORAL) 30 mLs, Per G Tube, Daily    QUEtiapine (SEROQUEL) 25 mg, Per G Tube, 2 times daily    scopolamine (TRANSDERM-SCOP) 1.3-1.5 mg (1 mg over 3 days) 1 patch, Transdermal, Every 3 days    sucralfate (CARAFATE) 1 g, Per G Tube, Before meals & nightly    venlafaxine 75 mg TR24 1 tablet, Per G Tube, 2 times daily       Current Inpatient Medications   apixaban  5 mg Oral BID    atorvastatin  10 mg Per G Tube Daily    busPIRone  5 mg Per G Tube TID    ceFEPime IV (PEDS and ADULTS)  1 g Intravenous Q8H    famotidine (PF)  20 mg Intravenous Daily    levetiracetam  1,500 mg Per G Tube BID    mupirocin   Nasal BID    neomycin-bacitracin-polymyxin   Topical (Top) Daily    potassium chloride in water  20 mEq Intravenous Q2H     tobramycin IV (PEDS and ADULTS)  7 mg/kg (Adjusted) Intravenous Once       Current Intravenous Infusions   amiodarone in dextrose 5%  0.5 mg/min Intravenous Continuous 16.7 mL/hr at 01/07/25 0534 0.5 mg/min at 01/07/25 0534    NORepinephrine bitartrate-D5W  0-3 mcg/kg/min Intravenous Continuous 4.9 mL/hr at 01/06/25 1458 0.04 mcg/kg/min at 01/06/25 1458         Review of Systems   Unable to perform ROS: Patient nonverbal          Objective:       Intake/Output Summary (Last 24 hours) at 1/7/2025 0843  Last data filed at 1/6/2025 1752  Gross per 24 hour   Intake 1336.71 ml   Output 452 ml   Net 884.71 ml         Vital Signs (Most Recent):  Temp: 98.5 °F (36.9 °C) (01/07/25 0800)  Pulse: 89 (01/07/25 0600)  Resp: (!) 30 (01/07/25 0600)  BP: 100/75 (01/07/25 0600)  SpO2: 98 % (01/07/25 0600)  Body mass index is 22.44 kg/m².  Weight: 65 kg (143 lb 4.8 oz) Vital Signs (24h Range):  Temp:  [98.3 °F (36.8 °C)-99.4 °F (37.4 °C)] 98.5 °F (36.9 °C)  Pulse:  [] 89  Resp:  [20-37] 30  SpO2:  [72 %-100 %] 98 %  BP: ()/(55-93) 100/75     Physical Exam  General-cachectic chronically ill-appearing elderly man on mechanical ventilation  HEENT-defect in the right skull from previous craniectomy.  Conjunctiva pink sclerae nonicteric  Neck-tracheostomy in the midline  Chest-equal bilateral breath sounds  CV-irregular tachycardia  Abdomen-PEJ-tube in the upper abdomen.  Extremities contracted    Lines/Drains/Airways       Peripherally Inserted Central Catheter Line  Duration             PICC Triple Lumen 01/06/25 1030 right brachial <1 day              Drain  Duration                  Gastrostomy/Enterostomy 10/30/24 1200 Gastrostomy-jejunostomy feeding 68 days         Urethral Catheter 01/05/25 2208 Temperature probe 1 day              Airway  Duration             Adult Surgical Airway 08/19/24 0120 Shiley Extra Large Cuffed Distal 6.0/ 75mm 141 days              Peripheral Intravenous Line  Duration                   Peripheral IV - Single Lumen 01/05/25 2139 20 G Left;Posterior Hand 1 day         Peripheral IV - Single Lumen 01/05/25 2140 20 G Posterior;Right Hand 1 day         Peripheral IV - Single Lumen 01/05/25 2252 20 G Posterior;Right Forearm 1 day         Peripheral IV - Single Lumen 01/06/25 0015 18 G 2 1/4 in Yes Anterior;Right Upper Arm 1 day                    Significant Labs:    Lab Results   Component Value Date    WBC 12.07 (H) 01/07/2025    HGB 9.3 (L) 01/07/2025    HCT 29.0 (L) 01/07/2025    MCV 87.6 01/07/2025     01/07/2025           BMP  Lab Results   Component Value Date     01/07/2025    K 3.1 (L) 01/07/2025    CO2 24 01/07/2025    BUN 27.7 (H) 01/07/2025    CREATININE 0.76 01/07/2025    CALCIUM 8.6 (L) 01/07/2025    AGAP 11.0 01/07/2025    EGFRNONAA 61 04/23/2022         ABG  Recent Labs   Lab 01/06/25 0618   PH 7.560*   PO2 73.0*   PCO2 32.0*   HCO3 28.7*   POCBASEDEF 6.40*       Mechanical Ventilation Support:  Vent Mode: SIMV (01/07/25 0520)  Ventilator Initiated: Yes (01/05/25 2105)  Set Rate: 30 BPM (01/07/25 0520)  Vt Set: 400 mL (01/07/25 0520)  PEEP/CPAP: 5 cmH20 (01/07/25 0520)  Oxygen Concentration (%): 30 (01/07/25 0520)  Peak Airway Pressure: 31 cmH20 (01/07/25 0520)  Total Ve: 12 L/m (01/07/25 0520)  F/VT Ratio<105 (RSBI): (!) 71.79 (01/06/25 1100)      Significant Imaging:  I have reviewed the pertinent imaging within the past 24 hours.  Haziness in the right lung base consistent with pneumonia.  Tracheostomy tube in the midline of trachea      Assessment/Plan:     Assessment  Aspiration pneumonia  Chronic respiratory failure  History of hemorrhagic stroke, right craniectomy, and residual left-sided weakness  Recent admit with UTI  Shock probably secondary to above along with hypovolemia  History of chronic atrial fib now with RVR likely secondary to above  BRIAN-resolving      Plan  Await further recommendations from Cardiology.  Replace potassium  We will continue tobramycin  and cefepime.  Continue present mechanical ventilation.      The patient remains at high risk of decompensation and death and will remain in ICU level care    36 min of critical care time was spent reviewing the patient's chart including medications, radiographs, labs, pertinent cultures and pathology data, other consultant notes/recomendations as well as titration of vasopressors, adjustment of mechanical ventilatory or NIPPV support, as well as discussion of goals of care with nursing staff, respiratory therapy at the bedside and with family at the bedside/via phone.          ROBB Drake MD  Pulmonary Critical Care Medicine  [facility] - [facility] ICU  DOS: 01/07/2025

## 2025-01-07 NOTE — PROGRESS NOTES
[facility] *    Cardiology  Progress Note    Patient Name: Delio Daniel Jr.  MRN: [id number]  Admission Date: 1/5/2025  Hospital Length of Stay: 2 days  Code Status: Full Code   Attending Provider: KODI Drake MD   Consulting Provider: MERLIN Farmer  Primary Care Physician: No primary care provider on file.  Principal Problem:<principal problem not specified>    Patient information was obtained from past medical records and ER records.     Subjective:     Chief Complaint/Reason for Consult: PAF/RVR     HPI: Mr. Daniel is a 70 y/o male who is known to [facility], Dr. Flynn. The patient is a NH resident who was found to have Tachypnea and Lethargy. EMS was notified and he was found to be in A.Fib with RVR and hypotensive despite volume resuscitation. He was placed on Levophed and Amiodarone and Aspiration Pneumonitis and was Admitted to ICU for further workup and management. [facility] was consulted for PAF/RVR.    1.7.25: NAD. Trach/Vented. AF with CVR. Amiodarone 0.5mg/min, Off Pressors.     PMH: Arthritis, AF, BPH, Cardiac Arrest, CAD, Renal Cyst, Diverticulosis, Hyperlipidemia, Hypertension, MI, Obesity, Lever Steatosis, Stroke  PSH: Pacemaker, LHC, Colonoscopy, EGD, Colon Excision, Tracheostomy  Family History: Mother - HTN; Sister - HTN; Brother - HTN   Social History: Tobacco - Negative, Alcohol - Negative, Substance Abuse - Negative    Previous Cardiac Diagnostics:   Echocardiogram (10.22.24):  Left Ventricle: The left ventricle is normal in size. Mildly increased wall thickness. There is normal systolic function with a visually estimated ejection fraction of 65%. Grade I diastolic dysfunction.  Right Ventricle: Normal right ventricular cavity size. Systolic function is normal.  Aortic Valve: There is mild aortic valve sclerosis.  Mitral Valve: There is mild (1+) regurgitation.  Tricuspid Valve: There is mild (1+) regurgitation.  The estimated pulmonary artery systolic pressure is 21  mmHg.  AICD/pacemaker lead noted.  No evidence of vegetation noted.  No evidence of valvular endocarditis noted.  If clinical suspicion is high would recommend transesophageal echocardiogram.     Echocardiogram (5.3.24):  EF 65%  RV with Normal RV Function.  MR- Mild. TR- Mild.   Pericardial Effusion- None. Anterior Fat Pad Noted.      ECHO (1.15.24):  TDS. No Definity contrast available for use. Left Ventricle: The left ventricle is normal in size. Moderately increased wall thickness. Normal wall motion. There is normal systolic function. Ejection fraction by visual approximation is 55%. Right Ventricle: Normal right ventricular cavity size. Systolic function is borderline low. Left Atrium: Left atrium is severely dilated. Right Atrium: Right atrium is mildly dilated. Mitral Valve: There is bileaflet sclerosis. There is mild regurgitation. IVC/SVC: Normal venous pressure at 3 mmHg.     Venous US LUE (12.26.23):  There was no evidence of deep vein thrombosis in the left upper extremity.   A superficial thrombosis was identified in the left cephalic vein.      Carotid US (12.19.23):  The right internal carotid artery demonstrated less than 50% stenosis.  The left internal carotid artery demonstrated less than 50% stenosis.  The bilateral vertebral arteries were patent with antegrade flow.  Bilateral internal carotid arteries demonstrated decreased velocities starting from the common carotid arteries.      LHC (5.25.18):   LM: Normal. Normal size and bifurcation. LAD: Abnormal. Large, mild atherosclerotic plaque, moderately large diagonals. Mid LAD 30% stenosis. The lesion was tubular and eccentric. LCX: abnormal and Large, mild atherosclerotic plaque, large OM with mild narrowing.OM 1 35% stenosis. RCA: normal. Large and no significant disease.     Review of patient's allergies indicates:  No Known Allergies  No current facility-administered medications on file prior to encounter.     Current Outpatient Medications  on File Prior to Encounter   Medication Sig    amiodarone (PACERONE) 200 MG Tab 1 tablet (200 mg total) by Per G Tube route once daily.    ascorbic Acid (VITAMIN C) 500 mg CpSR 500 mg 2 (two) times daily. Per PEG tube    busPIRone (BUSPAR) 5 MG Tab 5 mg by Per G Tube route 3 (three) times daily.    cholestyramine (QUESTRAN) 4 gram packet 4 g once daily. Via PEG tube    cholestyramine-aspartame (QUESTRAN LIGHT) 4 gram PwPk 1 packet (4 g total) by Per G Tube route 2 (two) times daily.    finasteride (PROSCAR) 5 mg tablet 1 tablet (5 mg total) by Per G Tube route once daily.    furosemide (LASIX) 80 MG tablet 80 mg by Per G Tube route as needed (for Edema).    L. acidophilus/L.bulgaricus (FLORANEX ORAL) 1 packet by PEG Tube route Daily.    levetiracetam 500 mg/5 mL (5 mL) Soln 1,500 mg by Per G Tube route 2 (two) times daily. Nursing home reports medication hold by MD until level redraw on Monday.    LIPITOR 10 mg tablet 10 mg by Per G Tube route once daily.    metoprolol tartrate (LOPRESSOR) 25 MG tablet 1 tablet (25 mg total) by Per G Tube route 2 (two) times daily.    miconazole NITRATE 2 % (MICOTIN) 2 % top powder Apply topically 2 (two) times daily.    multivitamin (THERAGRAN) per tablet 1 tablet by Per G Tube route once daily.    polyethylene glycol (GLYCOLAX) 17 gram PwPk 17 g by Per G Tube route 2 (two) times daily as needed for Constipation.    protein supplement (PROMOD PROTEIN ORAL) 30 mLs by Per G Tube route once daily.    QUEtiapine (SEROQUEL) 25 MG Tab 25 mg by Per G Tube route 2 (two) times daily.    scopolamine (TRANSDERM-SCOP) 1.3-1.5 mg (1 mg over 3 days) Place 1 patch onto the skin Every 3 (three) days.    sucralfate (CARAFATE) 1 gram tablet 1 tablet (1 g total) by Per G Tube route 4 (four) times daily before meals and nightly.    venlafaxine 75 mg TR24 1 tablet by Per G Tube route 2 (two) times a day.     Review of Systems   Unable to perform ROS: Acuity of condition     Objective:     Vital Signs  (Most Recent):  Temp: 98.5 °F (36.9 °C) (01/07/25 0800)  Pulse: 89 (01/07/25 0600)  Resp: (!) 30 (01/07/25 0600)  BP: 100/75 (01/07/25 0600)  SpO2: 98 % (01/07/25 0600) Vital Signs (24h Range):  Temp:  [98.3 °F (36.8 °C)-99.4 °F (37.4 °C)] 98.5 °F (36.9 °C)  Pulse:  [] 89  Resp:  [20-37] 30  SpO2:  [81 %-100 %] 98 %  BP: ()/(55-92) 100/75   Weight: 65 kg (143 lb 4.8 oz)  Body mass index is 22.44 kg/m².  SpO2: 98 %       Intake/Output Summary (Last 24 hours) at 1/7/2025 1034  Last data filed at 1/6/2025 1752  Gross per 24 hour   Intake 1336.71 ml   Output 452 ml   Net 884.71 ml     Lines/Drains/Airways       Peripherally Inserted Central Catheter Line  Duration             PICC Triple Lumen 01/06/25 1030 right brachial 1 day              Drain  Duration                  Gastrostomy/Enterostomy 10/30/24 1200 Gastrostomy-jejunostomy feeding 68 days         Urethral Catheter 01/05/25 2208 Temperature probe 1 day              Airway  Duration             Adult Surgical Airway 08/19/24 0120 Shiley Extra Large Cuffed Distal 6.0/ 75mm 141 days              Peripheral Intravenous Line  Duration                  Peripheral IV - Single Lumen 01/05/25 2139 20 G Left;Posterior Hand 1 day         Peripheral IV - Single Lumen 01/05/25 2140 20 G Posterior;Right Hand 1 day         Peripheral IV - Single Lumen 01/05/25 2252 20 G Posterior;Right Forearm 1 day         Peripheral IV - Single Lumen 01/06/25 0015 18 G 2 1/4 in Yes Anterior;Right Upper Arm 1 day                  Significant Labs:   Chemistries:   Recent Labs   Lab 01/01/25  1113 01/02/25  0415 01/02/25  0415 01/05/25  1300 01/05/25  2140 01/06/25  0317 01/07/25  0258    143  --  145 146* 138 143   K 3.6 3.5  --  3.7 3.7 3.2* 3.1*    108*  --  109* 107 101 108*   CO2 27 24  --  23 23 21* 24   BUN 23.5 20.0  --  43.0* 48.3* 43.9* 27.7*   CREATININE 0.73 0.69*  --  1.94* 2.48* 2.07* 0.76   CALCIUM 8.8 8.6*  --  8.4* 9.5 8.4* 8.6*   BILITOT  --   --     < > 1.5 1.4 1.3 1.0   ALKPHOS  --   --    < > 97 100 91 84   ALT  --   --    < > 9 8 8 10   AST  --   --    < > 12 17 14 14   GLUCOSE 103 87  --  114 166* 403* 97   MG 2.30 2.20  --   --   --   --   --    TROPONINI  --   --   --   --  0.011  --   --     < > = values in this interval not displayed.        CBC/Anemia Labs: Coags:    Recent Labs   Lab 01/05/25  2140 01/06/25  0928 01/07/25  0258   WBC 17.77  17.77* 16.82* 12.07*   HGB 11.0* 10.0* 9.3*   HCT 35.1* 31.7* 29.0*    254 214   MCV 88.6 89.3 87.6   RDW 16.1 15.9 15.8    Recent Labs   Lab 01/05/25 2140   INR 2.7*   APTT 41.8*        Telemetry: AF/CVR    Physical Exam  Constitutional:       General: He is not in acute distress.     Appearance: Normal appearance. He is ill-appearing.   HENT:      Head: Normocephalic.      Mouth/Throat:      Mouth: Mucous membranes are moist.   Cardiovascular:      Rate and Rhythm: Normal rate. Rhythm irregular.      Pulses: Normal pulses.      Heart sounds: Normal heart sounds. No murmur heard.  Pulmonary:      Effort: Pulmonary effort is normal. No respiratory distress.      Breath sounds: Rhonchi present.      Comments: Ventilator Associated Breath Sounds  Vent Mode: SIMV  Oxygen Concentration (%):  [30] 30  Resp Rate Total:  [30 br/min-38 br/min] 30 br/min  Vt Set:  [400 mL-450 mL] 400 mL  PEEP/CPAP:  [5 cmH20] 5 cmH20  Mean Airway Pressure:  [8 fkT33-26 cmH20] 12 cmH20  Abdominal:      Palpations: Abdomen is soft.      Comments: + J Tube   Skin:     General: Skin is warm.   Neurological:      Comments: Non-Communicative since CVA       Home Medications:   No current facility-administered medications on file prior to encounter.     Current Outpatient Medications on File Prior to Encounter   Medication Sig Dispense Refill    amiodarone (PACERONE) 200 MG Tab 1 tablet (200 mg total) by Per G Tube route once daily.      ascorbic Acid (VITAMIN C) 500 mg CpSR 500 mg 2 (two) times daily. Per PEG tube      busPIRone (BUSPAR)  5 MG Tab 5 mg by Per G Tube route 3 (three) times daily.      cholestyramine (QUESTRAN) 4 gram packet 4 g once daily. Via PEG tube      cholestyramine-aspartame (QUESTRAN LIGHT) 4 gram PwPk 1 packet (4 g total) by Per G Tube route 2 (two) times daily. 180 packet 3    finasteride (PROSCAR) 5 mg tablet 1 tablet (5 mg total) by Per G Tube route once daily.      furosemide (LASIX) 80 MG tablet 80 mg by Per G Tube route as needed (for Edema).      L. acidophilus/L.bulgaricus (FLORANEX ORAL) 1 packet by PEG Tube route Daily.      levetiracetam 500 mg/5 mL (5 mL) Soln 1,500 mg by Per G Tube route 2 (two) times daily. Nursing home reports medication hold by MD until level redraw on Monday.      LIPITOR 10 mg tablet 10 mg by Per G Tube route once daily.      metoprolol tartrate (LOPRESSOR) 25 MG tablet 1 tablet (25 mg total) by Per G Tube route 2 (two) times daily.      miconazole NITRATE 2 % (MICOTIN) 2 % top powder Apply topically 2 (two) times daily.      multivitamin (THERAGRAN) per tablet 1 tablet by Per G Tube route once daily.      polyethylene glycol (GLYCOLAX) 17 gram PwPk 17 g by Per G Tube route 2 (two) times daily as needed for Constipation.      protein supplement (PROMOD PROTEIN ORAL) 30 mLs by Per G Tube route once daily.      QUEtiapine (SEROQUEL) 25 MG Tab 25 mg by Per G Tube route 2 (two) times daily.      scopolamine (TRANSDERM-SCOP) 1.3-1.5 mg (1 mg over 3 days) Place 1 patch onto the skin Every 3 (three) days.      sucralfate (CARAFATE) 1 gram tablet 1 tablet (1 g total) by Per G Tube route 4 (four) times daily before meals and nightly.      venlafaxine 75 mg TR24 1 tablet by Per G Tube route 2 (two) times a day.       Current Schedule Inpatient Medications:   apixaban  5 mg Oral BID    atorvastatin  10 mg Per G Tube Daily    busPIRone  5 mg Per G Tube TID    ceFEPime IV (PEDS and ADULTS)  1 g Intravenous Q8H    famotidine (PF)  20 mg Intravenous Daily    levetiracetam  1,500 mg Per G Tube BID     mupirocin   Nasal BID    neomycin-bacitracin-polymyxin   Topical (Top) Daily    potassium chloride in water  20 mEq Intravenous Q2H    tobramycin IV (PEDS and ADULTS)  7 mg/kg (Adjusted) Intravenous Once     Continuous Infusions:   amiodarone in dextrose 5%  0.5 mg/min Intravenous Continuous 16.7 mL/hr at 01/07/25 0534 0.5 mg/min at 01/07/25 0534    NORepinephrine bitartrate-D5W  0-3 mcg/kg/min Intravenous Continuous 4.9 mL/hr at 01/06/25 1458 0.04 mcg/kg/min at 01/06/25 1458     Assessment:   Persistent AF - Now CVR     - [id number] Score 4 (4.8% Stroke Risk per Year)   Aspiration Pneumonia   Acute Hypoxemic Respiratory Failure requiring Tracheostomy/Ventilation  CAD    - LHC (5.25.18): LM: Normal. Normal size and bifurcation. LAD: Abnormal. Large, mild atherosclerotic plaque, moderately large diagonals. Mid LAD 30% stenosis. The lesion was tubular and eccentric. LCX: abnormal and Large, mild atherosclerotic plaque, large OM with mild narrowing.OM 1 35% stenosis. RCA: normal. Large and no significant disease.      - ECHO (10.22.24) - LVEF 65%; DD I  SSS    - Dual Chamber ICD  Hypotension requiring Pressors - Resolved     - Hx of HTN   Hyperlipidemia  Leukocytosis - Improving   Anemia - Stable   History of Hemorrhagic CVA (12/2023)    - Residual Left Sided Deficits    - S/P Peg Tube and Trach   Small Left Pleural Effusion   Arthritis  Dysphagia    - s/p PEG   BRIAN/CKD   History of Seizure Disorder  Liver Steatosis  Diverticulosis  BPH  Depression  Obesity   Severe PCM   Electrolyte Derangements - Hypokalemia   No Known History of GIB    Patient has been screened and assessed by RD.  Malnutrition Type:  Context:  chronic illness  Level: severe  Malnutrition Characteristic Summary:  Weight Loss (Malnutrition): greater than 20% in 1 year  Energy Intake (Malnutrition): other (see comments) (Unable to assess)  Subcutaneous Fat (Malnutrition): severe depletion  Muscle Mass (Malnutrition): severe depletion  Fluid  Accumulation (Malnutrition): other (see comments) (Not present)    Plan:   D/C IV Amiodarone and Start Oral Amiodarone Load: Amiodarone 400mg PO BID x 3 Days then 200mg PO BID x 3 Days then 200mg PO Daily there after   Continue Eliquis RE Stroke Risk Reduction   Continue Current Treatement per Primary Team  Will Continue to Follow  Labs in AM: CBC, CMP and Mg    MERLIN Farmer  Cardiology  [facility]     I agree with the findings of the complexity of problems addressed and take responsibility for the plan's risks and complications. I approved the plan documented by Eduar Contreras NP.

## 2025-01-07 NOTE — PROGRESS NOTES
Inpatient Nutrition Assessment    Admit Date: 1/5/2025   Total duration of encounter: 2 days   Patient Age: 69 y.o.    Nutrition Recommendation/Prescription     Start tube feeding when appropriate.  Tube feeding recommendation:     Impact Peptide 1.5 goal rate 65 ml/hr to provide  1950 kcal/d  (101% est needs)  122 g protein/d (100% est needs)  1001 ml free water/d (52% est needs)  225gm CHO/d  (calculations based on estimated 20 hr/d run time)     Add Patel (provides 90 kcal, 2.5 g protein per serving) BID.    If no IV fluids running, can give 100ml q 2hr water flushes. Total water provided: 2000ml (104% est needs.)     Due to dx of malnutrition, pt at risk for refeeding syndrome. Start TF @ 20ml/hr and increase as tolerated 10ml/hr q8hr until goal rate reached.   May also need to consider additional thiamine per MD.      CBG management per MD.    May also need to consider additional vitamin and minerals per MD.  MVI 1 po daily  Vit C 500mg BID  Vit A 10,000 IU daily  Zinc sulfate 220mg Daily     Communication of Recommendations: reviewed with patient    Nutrition Assessment     Malnutrition Assessment/Nutrition-Focused Physical Exam    Malnutrition Context: chronic illness (01/06/25 0933)  Malnutrition Level: severe (01/06/25 0933)  Energy Intake (Malnutrition): other (see comments) (Unable to assess) (01/06/25 0933)  Weight Loss (Malnutrition): greater than 20% in 1 year (01/06/25 0933)  Subcutaneous Fat (Malnutrition): severe depletion (01/06/25 0933)  Orbital Region (Subcutaneous Fat Loss): severe depletion        Muscle Mass (Malnutrition): severe depletion (01/06/25 0933)  [place] Region (Muscle Loss): severe depletion  Clavicle Bone Region (Muscle Loss): severe depletion  Clavicle and Acromion Bone Region (Muscle Loss): severe depletion                 Fluid Accumulation (Malnutrition): other (see comments) (Not present) (01/06/25 0933)        A minimum of two characteristics is recommended for diagnosis of  either severe or non-severe malnutrition.    Chart Review    Reason Seen: continuous nutrition monitoring and malnutrition screening tool (MST)    Malnutrition Screening Tool Results   Have you recently lost weight without trying?: Unsure  Have you been eating poorly because of a decreased appetite?: Yes   MST Score: 3   Diagnosis:  Fatigue, SOB, sepsis    Relevant Medical History: CVA, trach, jtube, Afib, CAD, diverticulosis, HLD, HTN, MI, steatosis of liver    Scheduled Medications:  apixaban, 5 mg, BID  atorvastatin, 10 mg, Daily  busPIRone, 5 mg, TID  ceFEPime IV (PEDS and ADULTS), 1 g, Q8H  famotidine (PF), 20 mg, Daily  levetiracetam, 1,500 mg, BID  mupirocin, , BID  neomycin-bacitracin-polymyxin, , Daily  potassium chloride in water, 20 mEq, Q2H  tobramycin IV (PEDS and ADULTS), 7 mg/kg (Adjusted), Once    Continuous Infusions:  amiodarone in dextrose 5%, Last Rate: 0.5 mg/min (01/07/25 0534)  NORepinephrine bitartrate-D5W, Last Rate: 0.04 mcg/kg/min (01/06/25 1458)    PRN Medications:  acetaminophen, 650 mg, Q4H PRN  HYDROmorphone, 0.5 mg, Q6H PRN  ondansetron, 4 mg, Q8H PRN  sodium chloride 0.9%, 10 mL, PRN  sodium chloride 0.9%, 10 mL, Q12H PRN  tobramycin - pharmacy to dose, , pharmacy to manage frequency  vancomycin - pharmacy to dose, , pharmacy to manage frequency    Calorie Containing IV Medications: no significant kcals from medications at this time    Recent Labs   Lab 01/01/25  1113 01/02/25  0415 01/05/25  1300 01/05/25  2140 01/06/25  0317 01/06/25  0928 01/07/25  0258    143 145 146* 138  --  143   K 3.6 3.5 3.7 3.7 3.2*  --  3.1*   CALCIUM 8.8 8.6* 8.4* 9.5 8.4*  --  8.6*   MG 2.30 2.20  --   --   --   --   --     108* 109* 107 101  --  108*   CO2 27 24 23 23 21*  --  24   BUN 23.5 20.0 43.0* 48.3* 43.9*  --  27.7*   CREATININE 0.73 0.69* 1.94* 2.48* 2.07*  --  0.76   EGFRNORACEVR >60 >60 37 28 34  --  >60   GLUCOSE 103 87 114 166* 403*  --  97   BILITOT  --   --  1.5 1.4 1.3  --   "1.0   ALKPHOS  --   --  97 100 91  --  84   ALT  --   --  9 8 8  --  10   AST  --   --  12 17 14  --  14   ALBUMIN  --   --  2.8* 2.8* 2.3*  --  2.2*   WBC  --   --  14.78* 17.77  17.77*  --  16.82* 12.07*   HGB  --   --  11.3* 11.0*  --  10.0* 9.3*   HCT  --   --  36.2* 35.1*  --  31.7* 29.0*     Nutrition Orders:  Diet NPO      Appetite/Oral Intake: not applicable/not applicable  Factors Affecting Nutritional Intake: tracheostomy  Social Needs Impacting Access to Food: none identified pt at NH  Food/Alevism/Cultural Preferences: unable to obtain  Food Allergies: no known food allergies  Last Bowel Movement: 25  Wound(s):     Wound 25 2100 Pressure Injury medial Coccyx #1-Tissue loss description: Full thickness       Wound 24 1420 Pressure Injury Left Knee-Tissue loss description: Partial thickness     Comments    25: Pt known to RD from previous admissions. Some info pulled from previous RD admit notes since no H&P at time of RD visit. Will provide TF recommendations for when appropriate to start. Noted vomiting PTA (pt with jtube.) On Diabetisource AC @ 66 @ NH. No hx of DM and noted Glu >400. Discussed with RN. May need additional CBG coverage if CBGs elevated.     25: Plans for starting TF. Discussed with RN. No kcal from meds.     Anthropometrics    Height: 5' 7.01" (170.2 cm), Height Method: Estimated  Last Weight: 65 kg (143 lb 4.8 oz) (25 0931),    BMI (Calculated): 22.4  BMI Classification: normal (BMI 18.5-24.9)        Ideal Body Weight (IBW), Male: 148.06 lb     % Ideal Body Weight, Male (lb): 96.82 %                 Usual Body Weight (UBW), k kg (EMR wt from 24)  % Usual Body Weight: 59.21  % Weight Change From Usual Weight: -40.91 %  Usual Weight Provided By: EMR weight history    Wt Readings from Last 5 Encounters:   25 65 kg (143 lb 4.8 oz)   10/21/24 69.1 kg (152 lb 5.4 oz)   24 90.7 kg (199 lb 15.3 oz)   24 117.9 kg (260 lb)   24 " 117.9 kg (260 lb)     Weight Change(s) Since Admission:   Wt Readings from Last 1 Encounters:   01/06/25 0931 65 kg (143 lb 4.8 oz)   01/05/25 2053 65 kg (143 lb 4.8 oz)   Admit Weight: 65 kg (143 lb 4.8 oz) (01/05/25 2053),      Estimated Needs    Weight Used For Calorie Calculations: 65 kg (143 lb 4.8 oz)  Energy Calorie Requirements (kcal): 1929kcal (1.4 stress factor)  Energy Need Method: [place]-St Jeor  Weight Used For Protein Calculations: 65 kg (143 lb 4.8 oz)  Protein Requirements: 117-130gm (1.8-2g/kg )  Fluid Requirements (mL): 1929ml (1ml/kcal)  CHO Requirement: 215gm (45% est kcal needs)     Enteral Nutrition     Patient not receiving enteral nutrition at this time.    Parenteral Nutrition     Patient not receiving parenteral nutrition support at this time.    Evaluation of Received Nutrient Intake    Calories: not meeting estimated needs  Protein: not meeting estimated needs    Patient Education     Not applicable.    Nutrition Diagnosis     PES: Inadequate oral intake related to chronic illness as evidenced by trach. (active)     PES: Severe chronic disease or condition related malnutrition Related to chronic condition As Evidenced by:  - weight loss: > 20% in 1 year - muscle mass depletion: 5 areas of severe muscle loss (Pectoralis, Clavicle, Trapezius, Temporalis, Acromion) - loss of subcutaneous fat: 2 areas of severe fat loss (Buccal, Infraorbital) new    Nutrition Interventions     Intervention(s): modified composition of enteral nutrition, modified rate of enteral nutrition, and collaboration with other providers    Goal: Meet greater than 80% of nutritional needs by follow-up. (goal progressing)  Goal: Tolerate enteral feeding at goal rate by follow-up. (goal progressing)    Nutrition Goals & Monitoring     Dietitian will monitor: energy intake and enteral nutrition intake  Discharge planning: tube feeding (Impact Peptide or Pivot 1.5 @ 65ml/hr with 100ml [id number] flushes + Patel BID)  Nutrition  Risk/Follow-Up: high (follow-up in 1-4 days)   Please consult if re-assessment needed sooner.

## 2025-01-08 LAB
ALBUMIN SERPL-MCNC: 2.1 G/DL (ref 3.4–4.8)
ALBUMIN/GLOB SERPL: 0.5 RATIO (ref 1.1–2)
ALP SERPL-CCNC: 83 UNIT/L (ref 40–150)
ALT SERPL-CCNC: 9 UNIT/L (ref 0–55)
ANION GAP SERPL CALC-SCNC: 9 MEQ/L
AST SERPL-CCNC: 11 UNIT/L (ref 5–34)
BASOPHILS # BLD AUTO: 0.03 X10(3)/MCL
BASOPHILS NFR BLD AUTO: 0.3 %
BILIRUB SERPL-MCNC: 0.7 MG/DL
BUN SERPL-MCNC: 20.3 MG/DL (ref 8.4–25.7)
CALCIUM SERPL-MCNC: 8.4 MG/DL (ref 8.8–10)
CHLORIDE SERPL-SCNC: 111 MMOL/L (ref 98–107)
CO2 SERPL-SCNC: 25 MMOL/L (ref 23–31)
CREAT SERPL-MCNC: 0.67 MG/DL (ref 0.72–1.25)
CREAT/UREA NIT SERPL: 30
EOSINOPHIL # BLD AUTO: 0.03 X10(3)/MCL (ref 0–0.9)
EOSINOPHIL NFR BLD AUTO: 0.3 %
ERYTHROCYTE [DISTWIDTH] IN BLOOD BY AUTOMATED COUNT: 15.7 % (ref 11.5–17)
GFR SERPLBLD CREATININE-BSD FMLA CKD-EPI: >60 ML/MIN/1.73/M2
GLOBULIN SER-MCNC: 4.2 GM/DL (ref 2.4–3.5)
GLUCOSE SERPL-MCNC: 101 MG/DL (ref 82–115)
HCT VFR BLD AUTO: 27.6 % (ref 42–52)
HGB BLD-MCNC: 8.8 G/DL (ref 14–18)
IMM GRANULOCYTES # BLD AUTO: 0.06 X10(3)/MCL (ref 0–0.04)
IMM GRANULOCYTES NFR BLD AUTO: 0.6 %
LYMPHOCYTES # BLD AUTO: 0.85 X10(3)/MCL (ref 0.6–4.6)
LYMPHOCYTES NFR BLD AUTO: 8.7 %
MAGNESIUM SERPL-MCNC: 2.2 MG/DL (ref 1.6–2.6)
MCH RBC QN AUTO: 28.4 PG (ref 27–31)
MCHC RBC AUTO-ENTMCNC: 31.9 G/DL (ref 33–36)
MCV RBC AUTO: 89 FL (ref 80–94)
MONOCYTES # BLD AUTO: 0.51 X10(3)/MCL (ref 0.1–1.3)
MONOCYTES NFR BLD AUTO: 5.2 %
NEUTROPHILS # BLD AUTO: 8.33 X10(3)/MCL (ref 2.1–9.2)
NEUTROPHILS NFR BLD AUTO: 84.9 %
NRBC BLD AUTO-RTO: 0 %
PLATELET # BLD AUTO: 196 X10(3)/MCL (ref 130–400)
PMV BLD AUTO: 10.4 FL (ref 7.4–10.4)
POTASSIUM SERPL-SCNC: 2.9 MMOL/L (ref 3.5–5.1)
PROT SERPL-MCNC: 6.3 GM/DL (ref 5.8–7.6)
RBC # BLD AUTO: 3.1 X10(6)/MCL (ref 4.7–6.1)
SODIUM SERPL-SCNC: 145 MMOL/L (ref 136–145)
WBC # BLD AUTO: 9.81 X10(3)/MCL (ref 4.5–11.5)

## 2025-01-08 PROCEDURE — 25000003 PHARM REV CODE 250

## 2025-01-08 PROCEDURE — 94761 N-INVAS EAR/PLS OXIMETRY MLT: CPT

## 2025-01-08 PROCEDURE — 63600175 PHARM REV CODE 636 W HCPCS: Performed by: INTERNAL MEDICINE

## 2025-01-08 PROCEDURE — 80053 COMPREHEN METABOLIC PANEL: CPT | Performed by: STUDENT IN AN ORGANIZED HEALTH CARE EDUCATION/TRAINING PROGRAM

## 2025-01-08 PROCEDURE — 99900026 HC AIRWAY MAINTENANCE (STAT)

## 2025-01-08 PROCEDURE — 99900031 HC PATIENT EDUCATION (STAT)

## 2025-01-08 PROCEDURE — 63600175 PHARM REV CODE 636 W HCPCS: Mod: JZ,TB

## 2025-01-08 PROCEDURE — 94760 N-INVAS EAR/PLS OXIMETRY 1: CPT

## 2025-01-08 PROCEDURE — 27000207 HC ISOLATION

## 2025-01-08 PROCEDURE — 27200966 HC CLOSED SUCTION SYSTEM

## 2025-01-08 PROCEDURE — 20000000 HC ICU ROOM

## 2025-01-08 PROCEDURE — 25000003 PHARM REV CODE 250: Performed by: NURSE PRACTITIONER

## 2025-01-08 PROCEDURE — 25000003 PHARM REV CODE 250: Performed by: INTERNAL MEDICINE

## 2025-01-08 PROCEDURE — 27100171 HC OXYGEN HIGH FLOW UP TO 24 HOURS

## 2025-01-08 PROCEDURE — 85025 COMPLETE CBC W/AUTO DIFF WBC: CPT

## 2025-01-08 PROCEDURE — 99900035 HC TECH TIME PER 15 MIN (STAT)

## 2025-01-08 PROCEDURE — 94003 VENT MGMT INPAT SUBQ DAY: CPT

## 2025-01-08 PROCEDURE — 83735 ASSAY OF MAGNESIUM: CPT | Performed by: NURSE PRACTITIONER

## 2025-01-08 PROCEDURE — 25000003 PHARM REV CODE 250: Performed by: STUDENT IN AN ORGANIZED HEALTH CARE EDUCATION/TRAINING PROGRAM

## 2025-01-08 PROCEDURE — 36415 COLL VENOUS BLD VENIPUNCTURE: CPT

## 2025-01-08 RX ORDER — POTASSIUM CHLORIDE 14.9 MG/ML
20 INJECTION INTRAVENOUS
Status: COMPLETED | OUTPATIENT
Start: 2025-01-08 | End: 2025-01-08

## 2025-01-08 RX ADMIN — POTASSIUM BICARBONATE 20 MEQ: 391 TABLET, EFFERVESCENT ORAL at 01:01

## 2025-01-08 RX ADMIN — APIXABAN 5 MG: 5 TABLET, FILM COATED ORAL at 09:01

## 2025-01-08 RX ADMIN — POTASSIUM CHLORIDE 20 MEQ: 14.9 INJECTION, SOLUTION INTRAVENOUS at 03:01

## 2025-01-08 RX ADMIN — MEROPENEM 2 G: 1 INJECTION, POWDER, FOR SOLUTION INTRAVENOUS at 03:01

## 2025-01-08 RX ADMIN — MUPIROCIN: 20 OINTMENT TOPICAL at 08:01

## 2025-01-08 RX ADMIN — ATORVASTATIN CALCIUM 10 MG: 10 TABLET, FILM COATED ORAL at 08:01

## 2025-01-08 RX ADMIN — ALTEPLASE 2 MG: 2.2 INJECTION, POWDER, LYOPHILIZED, FOR SOLUTION INTRAVENOUS at 04:01

## 2025-01-08 RX ADMIN — AMIODARONE HYDROCHLORIDE 400 MG: 200 TABLET ORAL at 08:01

## 2025-01-08 RX ADMIN — POTASSIUM CHLORIDE 20 MEQ: 14.9 INJECTION, SOLUTION INTRAVENOUS at 01:01

## 2025-01-08 RX ADMIN — ALTEPLASE 2 MG: 2.2 INJECTION, POWDER, LYOPHILIZED, FOR SOLUTION INTRAVENOUS at 05:01

## 2025-01-08 RX ADMIN — MUPIROCIN: 20 OINTMENT TOPICAL at 09:01

## 2025-01-08 RX ADMIN — FAMOTIDINE 20 MG: 10 INJECTION, SOLUTION INTRAVENOUS at 08:01

## 2025-01-08 RX ADMIN — MEROPENEM 2 G: 1 INJECTION, POWDER, FOR SOLUTION INTRAVENOUS at 10:01

## 2025-01-08 RX ADMIN — POTASSIUM BICARBONATE 20 MEQ: 391 TABLET, EFFERVESCENT ORAL at 05:01

## 2025-01-08 RX ADMIN — POTASSIUM BICARBONATE 20 MEQ: 391 TABLET, EFFERVESCENT ORAL at 10:01

## 2025-01-08 RX ADMIN — LEVETIRACETAM 1500 MG: 100 SOLUTION ORAL at 08:01

## 2025-01-08 RX ADMIN — MEROPENEM 2 G: 1 INJECTION, POWDER, FOR SOLUTION INTRAVENOUS at 06:01

## 2025-01-08 RX ADMIN — APIXABAN 5 MG: 5 TABLET, FILM COATED ORAL at 08:01

## 2025-01-08 RX ADMIN — BUSPIRONE HYDROCHLORIDE 5 MG: 5 TABLET ORAL at 09:01

## 2025-01-08 RX ADMIN — BUSPIRONE HYDROCHLORIDE 5 MG: 5 TABLET ORAL at 08:01

## 2025-01-08 RX ADMIN — BUSPIRONE HYDROCHLORIDE 5 MG: 5 TABLET ORAL at 03:01

## 2025-01-08 RX ADMIN — BACITRACIN ZINC, NEOMYCIN, POLYMYXIN B: 400; 3.5; 5 OINTMENT TOPICAL at 08:01

## 2025-01-08 RX ADMIN — LEVETIRACETAM 1500 MG: 100 SOLUTION ORAL at 09:01

## 2025-01-08 RX ADMIN — AMIODARONE HYDROCHLORIDE 400 MG: 200 TABLET ORAL at 09:01

## 2025-01-08 NOTE — PROGRESS NOTES
[facility] *    Cardiology  Progress Note    Patient Name: Delio Daniel Jr.  MRN: [id number]  Admission Date: 1/5/2025  Hospital Length of Stay: 3 days  Code Status: Full Code   Attending Provider: KODI Drake MD   Consulting Provider: MERLIN Farmer  Primary Care Physician: No primary care provider on file.  Principal Problem:<principal problem not specified>    Patient information was obtained from past medical records and ER records.     Subjective:     Chief Complaint/Reason for Consult: PAF/RVR     HPI: Mr. Daniel is a 68 y/o male who is known to [facility], Dr. Flynn. The patient is a NH resident who was found to have Tachypnea and Lethargy. EMS was notified and he was found to be in A.Fib with RVR and hypotensive despite volume resuscitation. He was placed on Levophed and Amiodarone and Aspiration Pneumonitis and was Admitted to ICU for further workup and management. [facility] was consulted for PAF/RVR.    1.7.25: NAD. Trach/Vented. AF with CVR. Amiodarone 0.5mg/min, Off Pressors.   1.8.25: NAD. Trach/Vented. AF with CVR. Oral Amiodarone Load.     PMH: Arthritis, AF, BPH, Cardiac Arrest, CAD, Renal Cyst, Diverticulosis, Hyperlipidemia, Hypertension, MI, Obesity, Lever Steatosis, Stroke  PSH: Pacemaker, LHC, Colonoscopy, EGD, Colon Excision, Tracheostomy  Family History: Mother - HTN; Sister - HTN; Brother - HTN   Social History: Tobacco - Negative, Alcohol - Negative, Substance Abuse - Negative    Previous Cardiac Diagnostics:   Echocardiogram (10.22.24):  Left Ventricle: The left ventricle is normal in size. Mildly increased wall thickness. There is normal systolic function with a visually estimated ejection fraction of 65%. Grade I diastolic dysfunction.  Right Ventricle: Normal right ventricular cavity size. Systolic function is normal.  Aortic Valve: There is mild aortic valve sclerosis.  Mitral Valve: There is mild (1+) regurgitation.  Tricuspid Valve: There is mild (1+)  regurgitation.  The estimated pulmonary artery systolic pressure is 21 mmHg.  AICD/pacemaker lead noted.  No evidence of vegetation noted.  No evidence of valvular endocarditis noted.  If clinical suspicion is high would recommend transesophageal echocardiogram.     Echocardiogram (5.3.24):  EF 65%  RV with Normal RV Function.  MR- Mild. TR- Mild.   Pericardial Effusion- None. Anterior Fat Pad Noted.      ECHO (1.15.24):  TDS. No Definity contrast available for use. Left Ventricle: The left ventricle is normal in size. Moderately increased wall thickness. Normal wall motion. There is normal systolic function. Ejection fraction by visual approximation is 55%. Right Ventricle: Normal right ventricular cavity size. Systolic function is borderline low. Left Atrium: Left atrium is severely dilated. Right Atrium: Right atrium is mildly dilated. Mitral Valve: There is bileaflet sclerosis. There is mild regurgitation. IVC/SVC: Normal venous pressure at 3 mmHg.     Venous US LUE (12.26.23):  There was no evidence of deep vein thrombosis in the left upper extremity.   A superficial thrombosis was identified in the left cephalic vein.      Carotid US (12.19.23):  The right internal carotid artery demonstrated less than 50% stenosis.  The left internal carotid artery demonstrated less than 50% stenosis.  The bilateral vertebral arteries were patent with antegrade flow.  Bilateral internal carotid arteries demonstrated decreased velocities starting from the common carotid arteries.      LHC (5.25.18):   LM: Normal. Normal size and bifurcation. LAD: Abnormal. Large, mild atherosclerotic plaque, moderately large diagonals. Mid LAD 30% stenosis. The lesion was tubular and eccentric. LCX: abnormal and Large, mild atherosclerotic plaque, large OM with mild narrowing.OM 1 35% stenosis. RCA: normal. Large and no significant disease.     Review of patient's allergies indicates:  No Known Allergies  No current facility-administered  medications on file prior to encounter.     Current Outpatient Medications on File Prior to Encounter   Medication Sig    amiodarone (PACERONE) 200 MG Tab 1 tablet (200 mg total) by Per G Tube route once daily.    ascorbic Acid (VITAMIN C) 500 mg CpSR 500 mg 2 (two) times daily. Per PEG tube    busPIRone (BUSPAR) 5 MG Tab 5 mg by Per G Tube route 3 (three) times daily.    cholestyramine (QUESTRAN) 4 gram packet 4 g once daily. Via PEG tube    cholestyramine-aspartame (QUESTRAN LIGHT) 4 gram PwPk 1 packet (4 g total) by Per G Tube route 2 (two) times daily.    finasteride (PROSCAR) 5 mg tablet 1 tablet (5 mg total) by Per G Tube route once daily.    furosemide (LASIX) 80 MG tablet 80 mg by Per G Tube route as needed (for Edema).    L. acidophilus/L.bulgaricus (FLORANEX ORAL) 1 packet by PEG Tube route Daily.    levetiracetam 500 mg/5 mL (5 mL) Soln 1,500 mg by Per G Tube route 2 (two) times daily. Nursing home reports medication hold by MD until level redraw on Monday.    LIPITOR 10 mg tablet 10 mg by Per G Tube route once daily.    metoprolol tartrate (LOPRESSOR) 25 MG tablet 1 tablet (25 mg total) by Per G Tube route 2 (two) times daily.    miconazole NITRATE 2 % (MICOTIN) 2 % top powder Apply topically 2 (two) times daily.    multivitamin (THERAGRAN) per tablet 1 tablet by Per G Tube route once daily.    polyethylene glycol (GLYCOLAX) 17 gram PwPk 17 g by Per G Tube route 2 (two) times daily as needed for Constipation.    protein supplement (PROMOD PROTEIN ORAL) 30 mLs by Per G Tube route once daily.    QUEtiapine (SEROQUEL) 25 MG Tab 25 mg by Per G Tube route 2 (two) times daily.    scopolamine (TRANSDERM-SCOP) 1.3-1.5 mg (1 mg over 3 days) Place 1 patch onto the skin Every 3 (three) days.    sucralfate (CARAFATE) 1 gram tablet 1 tablet (1 g total) by Per G Tube route 4 (four) times daily before meals and nightly.    venlafaxine 75 mg TR24 1 tablet by Per G Tube route 2 (two) times a day.     Review of Systems    Unable to perform ROS: Acuity of condition     Objective:     Vital Signs (Most Recent):  Temp: 99.2 °F (37.3 °C) (01/08/25 0400)  Pulse: (!) 113 (01/08/25 0600)  Resp: (!) 28 (01/08/25 0600)  BP: 109/85 (01/08/25 0600)  SpO2: 98 % (01/08/25 0600) Vital Signs (24h Range):  Temp:  [98.8 °F (37.1 °C)-99.3 °F (37.4 °C)] 99.2 °F (37.3 °C)  Pulse:  [] 113  Resp:  [19-34] 28  SpO2:  [90 %-100 %] 98 %  BP: ()/(68-97) 109/85   Weight: 65 kg (143 lb 4.8 oz)  Body mass index is 22.44 kg/m².  SpO2: 98 %       Intake/Output Summary (Last 24 hours) at 1/8/2025 1134  Last data filed at 1/8/2025 0619  Gross per 24 hour   Intake 929.37 ml   Output 700 ml   Net 229.37 ml     Lines/Drains/Airways       Peripherally Inserted Central Catheter Line  Duration             PICC Triple Lumen 01/06/25 1030 right brachial 2 days              Drain  Duration                  Gastrostomy/Enterostomy 10/30/24 1200 Gastrostomy-jejunostomy feeding 70 days         Urethral Catheter 01/05/25 2208 Temperature probe 2 days              Airway  Duration             Adult Surgical Airway 08/19/24 0120 Shiley Extra Large Cuffed Distal 6.0/ 75mm 142 days              Peripheral Intravenous Line  Duration                  Peripheral IV - Single Lumen 01/05/25 2139 20 G Left;Posterior Hand 2 days         Peripheral IV - Single Lumen 01/05/25 2252 20 G Posterior;Right Forearm 2 days         Peripheral IV - Single Lumen 01/06/25 0015 18 G 2 1/4 in Yes Anterior;Right Upper Arm 2 days                  Significant Labs:   Chemistries:   Recent Labs   Lab 01/02/25  0415 01/02/25  0415 01/05/25  1300 01/05/25  2140 01/06/25  0317 01/07/25  0258 01/08/25  0402     --  145 146* 138 143 145   K 3.5  --  3.7 3.7 3.2* 3.1* 2.9*   *  --  109* 107 101 108* 111*   CO2 24  --  23 23 21* 24 25   BUN 20.0  --  43.0* 48.3* 43.9* 27.7* 20.3   CREATININE 0.69*  --  1.94* 2.48* 2.07* 0.76 0.67*   CALCIUM 8.6*  --  8.4* 9.5 8.4* 8.6* 8.4*   BILITOT  --     < > 1.5 1.4 1.3 1.0 0.7   ALKPHOS  --    < > 97 100 91 84 83   ALT  --    < > 9 8 8 10 9   AST  --    < > 12 17 14 14 11   GLUCOSE 87  --  114 166* 403* 97 101   MG 2.20  --   --   --   --   --  2.20   TROPONINI  --   --   --  0.011  --   --   --     < > = values in this interval not displayed.        CBC/Anemia Labs: Coags:    Recent Labs   Lab 01/06/25  0928 01/07/25  0258 01/08/25  0402   WBC 16.82* 12.07* 9.81   HGB 10.0* 9.3* 8.8*   HCT 31.7* 29.0* 27.6*    214 196   MCV 89.3 87.6 89.0   RDW 15.9 15.8 15.7    Recent Labs   Lab 01/05/25  2140   INR 2.7*   APTT 41.8*        Telemetry: AF/CVR    Physical Exam  Constitutional:       General: He is not in acute distress.     Appearance: Normal appearance. He is ill-appearing.   HENT:      Head: Normocephalic.      Mouth/Throat:      Mouth: Mucous membranes are dry.   Cardiovascular:      Rate and Rhythm: Normal rate. Rhythm irregular.      Pulses: Normal pulses.      Heart sounds: Normal heart sounds. No murmur heard.  Pulmonary:      Effort: Pulmonary effort is normal. No respiratory distress.      Breath sounds: Rhonchi present.      Comments: Ventilator Associated Breath Sounds  Vent Mode: PRVC A/C  Oxygen Concentration (%):  [30] 30  Resp Rate Total:  [20 br/min-31 br/min] 30 br/min  Vt Set:  [400 mL] 400 mL  PEEP/CPAP:  [5 cmH20] 5 cmH20  Mean Airway Pressure:  [7 esL44-28 cmH20] 7 cmH20  Abdominal:      Palpations: Abdomen is soft.      Comments: + J Tube   Skin:     General: Skin is warm.   Neurological:      Comments: Non-Communicative since CVA       Home Medications:   No current facility-administered medications on file prior to encounter.     Current Outpatient Medications on File Prior to Encounter   Medication Sig Dispense Refill    amiodarone (PACERONE) 200 MG Tab 1 tablet (200 mg total) by Per G Tube route once daily.      ascorbic Acid (VITAMIN C) 500 mg CpSR 500 mg 2 (two) times daily. Per PEG tube      busPIRone (BUSPAR) 5 MG Tab 5 mg by  Per G Tube route 3 (three) times daily.      cholestyramine (QUESTRAN) 4 gram packet 4 g once daily. Via PEG tube      cholestyramine-aspartame (QUESTRAN LIGHT) 4 gram PwPk 1 packet (4 g total) by Per G Tube route 2 (two) times daily. 180 packet 3    finasteride (PROSCAR) 5 mg tablet 1 tablet (5 mg total) by Per G Tube route once daily.      furosemide (LASIX) 80 MG tablet 80 mg by Per G Tube route as needed (for Edema).      L. acidophilus/L.bulgaricus (FLORANEX ORAL) 1 packet by PEG Tube route Daily.      levetiracetam 500 mg/5 mL (5 mL) Soln 1,500 mg by Per G Tube route 2 (two) times daily. Nursing home reports medication hold by MD until level redraw on Monday.      LIPITOR 10 mg tablet 10 mg by Per G Tube route once daily.      metoprolol tartrate (LOPRESSOR) 25 MG tablet 1 tablet (25 mg total) by Per G Tube route 2 (two) times daily.      miconazole NITRATE 2 % (MICOTIN) 2 % top powder Apply topically 2 (two) times daily.      multivitamin (THERAGRAN) per tablet 1 tablet by Per G Tube route once daily.      polyethylene glycol (GLYCOLAX) 17 gram PwPk 17 g by Per G Tube route 2 (two) times daily as needed for Constipation.      protein supplement (PROMOD PROTEIN ORAL) 30 mLs by Per G Tube route once daily.      QUEtiapine (SEROQUEL) 25 MG Tab 25 mg by Per G Tube route 2 (two) times daily.      scopolamine (TRANSDERM-SCOP) 1.3-1.5 mg (1 mg over 3 days) Place 1 patch onto the skin Every 3 (three) days.      sucralfate (CARAFATE) 1 gram tablet 1 tablet (1 g total) by Per G Tube route 4 (four) times daily before meals and nightly.      venlafaxine 75 mg TR24 1 tablet by Per G Tube route 2 (two) times a day.       Current Schedule Inpatient Medications:   amiodarone  400 mg Oral BID    Followed by    [START ON 1/10/2025] amiodarone  200 mg Oral BID    Followed by    [START ON 1/13/2025] amiodarone  200 mg Oral Daily    apixaban  5 mg Oral BID    atorvastatin  10 mg Per G Tube Daily    busPIRone  5 mg Per G Tube TID     famotidine (PF)  20 mg Intravenous Daily    levetiracetam  1,500 mg Per G Tube BID    meropenem IV (PEDS and ADULTS)  2 g Intravenous Q8H    mupirocin   Nasal BID    neomycin-bacitracin-polymyxin   Topical (Top) Daily    potassium bicarbonate  20 mEq Per G Tube Q4H    potassium chloride in water  20 mEq Intravenous Q2H     Continuous Infusions:   NORepinephrine bitartrate-D5W  0-3 mcg/kg/min Intravenous Continuous   Stopped at 01/06/25 1802     Assessment:   Persistent AF - Now CVR     - [id number] Score 4 (4.8% Stroke Risk per Year)   Aspiration Pneumonia   Acute Hypoxemic Respiratory Failure requiring Tracheostomy/Ventilation  CAD    - LHC (5.25.18): LM: Normal. Normal size and bifurcation. LAD: Abnormal. Large, mild atherosclerotic plaque, moderately large diagonals. Mid LAD 30% stenosis. The lesion was tubular and eccentric. LCX: abnormal and Large, mild atherosclerotic plaque, large OM with mild narrowing.OM 1 35% stenosis. RCA: normal. Large and no significant disease.      - ECHO (10.22.24) - LVEF 65%; DD I  SSS    - Dual Chamber ICD  Hypotension requiring Pressors - Resolved     - Hx of HTN   Hyperlipidemia  Leukocytosis - Improving   Anemia - Worsening   History of Hemorrhagic CVA (12/2023)    - Residual Left Sided Deficits    - S/P Peg Tube and Trach   Small Left Pleural Effusion   Arthritis  Dysphagia    - s/p PEG   BRIAN/CKD   History of Seizure Disorder  Liver Steatosis  Diverticulosis  BPH  Depression  Obesity   Severe PCM   Electrolyte Derangements - Hypokalemia   No Known History of GIB    Patient has been screened and assessed by RD.  Malnutrition Type:  Context:  chronic illness  Level: severe  Malnutrition Characteristic Summary:  Weight Loss (Malnutrition): greater than 20% in 1 year  Energy Intake (Malnutrition): other (see comments) (Unable to assess)  Subcutaneous Fat (Malnutrition): severe depletion  Muscle Mass (Malnutrition): severe depletion  Fluid Accumulation (Malnutrition): other (see  comments) (Not present)    Plan:   Continue Oral Amiodarone Load  Continue Eliquis RE Stroke Risk Reduction   Continue Current Treatement per Primary Team  F/U with Dr. Flynn in 1-2 Weeks  We will be available as needed    MERLIN Farmer  Cardiology  [facility]     I agree with the findings of the complexity of problems addressed and take responsibility for the plan's risks and complications. I approved the plan documented by dEuar Contreras NP.

## 2025-01-08 NOTE — PROGRESS NOTES
[facility] - [facility] ICU  Pulmonary Critical Care Note    Patient Name: Delio Daniel Jr.  MRN: [id number]  Admission Date: 1/5/2025  Hospital Length of Stay: 3 days  Code Status: Full Code  Attending Provider: KODI Drake MD  Primary Care Provider: No primary care provider on file.     Subjective:     HPI:   69-year-old from [facility] was sent to the emergency department yesterday with tachypnea and increased lethargy.  He was found to be in AFib with RVR.  Was also hypotensive despite volume resuscitation and was placed on Levophed and amiodarone and admitted to ICU.  Patient is nonverbal with a tracheostomy and J-tube in place.  He is on a ventilator at [facility].    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  Permanent pacemaker/defibrillator and a STEMI in 2003.  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  Recent hospitalization at [facility] in November 2024 for multidrug resistant UTI and volume depletion.  He has a chronic sacral decubitus.  Also has history of aspiration pneumonia.    Today:  Patient remains minimally responsive.  Respirations 30 per the ventilator.  Heart rate better controlled after digoxin yesterday.  Remains on amiodarone drip.  Now off Levophed.  Potassium still low despite replacement yesterday.  Id consult noted.  Now on Merrem.        Past Medical History:   Diagnosis Date    Arthritis     Atrial fibrillation     BPH (benign prostatic hyperplasia)     Cardiac arrest     Coronary artery disease     Cyst, kidney, acquired     Diverticulosis     Hyperlipidemia     Hypertension     MI (myocardial infarction)     Obesity     Steatosis of liver     Stroke        Past Surgical History:   Procedure Laterality Date    A-V CARDIAC PACEMAKER INSERTION Right     CARDIAC CATHETERIZATION      COLONOSCOPY W/ BIOPSIES      CRANIECTOMY Right  12/20/2023    Procedure: CRANIECTOMY;  Surgeon: Artem Can MD;  Location: [facility];  Service: Neurosurgery;  Laterality: Right;    ESOPHAGOGASTRODUODENOSCOPY W/ PEG N/A 1/2/2024    Procedure: PEG;  Surgeon: Tani Day MD;  Location: [facility] ENDOSCOPY;  Service: Gastroenterology;  Laterality: N/A;    ESOPHAGOGASTRODUODENOSCOPY W/ PEG N/A 10/30/2024    Procedure: EGD w/ Jtube placement;  Surgeon: Gabriele Salcido MD;  Location: [facility] ENDOSCOPY;  Service: Endoscopy;  Laterality: N/A;  with J tube extension    excision of colon      TRACHEOSTOMY N/A 12/29/2023    Procedure: CREATION, TRACHEOSTOMY;  Surgeon: Patricia Winslow MD;  Location: [facility];  Service: ENT;  Laterality: N/A;  REQ 1130 //  NEEDS 2 SCRUBS       Social History     Socioeconomic History    Marital status:     Number of children: 9   Occupational History    Occupation: retired   Tobacco Use    Smoking status: Never    Smokeless tobacco: Never   Substance and Sexual Activity    Alcohol use: Not Currently    Drug use: Not Currently    Sexual activity: Not Currently     Partners: Female     Social Drivers of Health     Financial Resource Strain: Patient Unable To Answer (1/6/2025)    Overall Financial Resource Strain (CARDIA)     Difficulty of Paying Living Expenses: Patient unable to answer   Food Insecurity: Patient Unable To Answer (1/6/2025)    Hunger Vital Sign     Worried About Running Out of Food in the Last Year: Patient unable to answer     Ran Out of Food in the Last Year: Patient unable to answer   Transportation Needs: Patient Unable To Answer (1/6/2025)    TRANSPORTATION NEEDS     Transportation : Patient unable to answer   Physical Activity: Sufficiently Active (8/5/2024)    Exercise Vital Sign     Days of Exercise per Week: 5 days     Minutes of Exercise per Session: 30 min   Stress: Patient Unable To Answer (1/6/2025)    [employer] - Occupational Stress Questionnaire     Feeling of  Stress : Patient unable to answer   Housing Stability: Patient Unable To Answer (1/6/2025)    Housing Stability Vital Sign     Unable to Pay for Housing in the Last Year: Patient unable to answer     Homeless in the Last Year: Patient unable to answer           Current Outpatient Medications   Medication Instructions    amiodarone (PACERONE) 200 mg, Per G Tube, Daily    ascorbic Acid (VITAMIN C) 500 mg, 2 times daily, Per PEG tube    busPIRone (BUSPAR) 5 mg, Per G Tube, 3 times daily    cholestyramine (QUESTRAN) 4 gram packet 4 g, Daily, Via PEG tube    cholestyramine-aspartame (QUESTRAN LIGHT) 4 gram PwPk 4 g, Per G Tube, 2 times daily    finasteride (PROSCAR) 5 mg, Per G Tube, Daily    furosemide (LASIX) 80 mg, Per G Tube, As needed (PRN)    L. acidophilus/L.bulgaricus (FLORANEX ORAL) 1 packet, PEG Tube, Daily    levetiracetam 1,500 mg, Per G Tube, 2 times daily, Nursing home reports medication hold by MD until level redraw on Monday.    LIPITOR 10 mg, Per G Tube, Daily    metoprolol tartrate (LOPRESSOR) 25 mg, Per G Tube, 2 times daily    miconazole NITRATE 2 % (MICOTIN) 2 % top powder Topical (Top), 2 times daily    multivitamin (THERAGRAN) per tablet 1 tablet, Per G Tube, Daily    polyethylene glycol (GLYCOLAX) 17 g, Per G Tube, 2 times daily PRN    protein supplement (PROMOD PROTEIN ORAL) 30 mLs, Per G Tube, Daily    QUEtiapine (SEROQUEL) 25 mg, Per G Tube, 2 times daily    scopolamine (TRANSDERM-SCOP) 1.3-1.5 mg (1 mg over 3 days) 1 patch, Transdermal, Every 3 days    sucralfate (CARAFATE) 1 g, Per G Tube, Before meals & nightly    venlafaxine 75 mg TR24 1 tablet, Per G Tube, 2 times daily       Current Inpatient Medications   amiodarone  400 mg Oral BID    Followed by    [START ON 1/10/2025] amiodarone  200 mg Oral BID    Followed by    [START ON 1/13/2025] amiodarone  200 mg Oral Daily    apixaban  5 mg Oral BID    atorvastatin  10 mg Per G Tube Daily    busPIRone  5 mg Per G Tube TID    famotidine (PF)  20  mg Intravenous Daily    levetiracetam  1,500 mg Per G Tube BID    meropenem IV (PEDS and ADULTS)  2 g Intravenous Q8H    mupirocin   Nasal BID    neomycin-bacitracin-polymyxin   Topical (Top) Daily    potassium bicarbonate  20 mEq Per G Tube Q4H    potassium chloride in water  20 mEq Intravenous Q2H       Current Intravenous Infusions   NORepinephrine bitartrate-D5W  0-3 mcg/kg/min Intravenous Continuous   Stopped at 01/06/25 1802         Review of Systems   Unable to perform ROS: Patient nonverbal          Objective:       Intake/Output Summary (Last 24 hours) at 1/8/2025 0924  Last data filed at 1/8/2025 0619  Gross per 24 hour   Intake 929.37 ml   Output 700 ml   Net 229.37 ml         Vital Signs (Most Recent):  Temp: 99.2 °F (37.3 °C) (01/08/25 0400)  Pulse: (!) 113 (01/08/25 0600)  Resp: (!) 28 (01/08/25 0600)  BP: 109/85 (01/08/25 0600)  SpO2: 98 % (01/08/25 0600)  Body mass index is 22.44 kg/m².  Weight: 65 kg (143 lb 4.8 oz) Vital Signs (24h Range):  Temp:  [98.8 °F (37.1 °C)-99.3 °F (37.4 °C)] 99.2 °F (37.3 °C)  Pulse:  [] 113  Resp:  [19-34] 28  SpO2:  [90 %-100 %] 98 %  BP: ()/(68-97) 109/85     Physical Exam  General-cachectic chronically ill-appearing elderly man on mechanical ventilation  HEENT-defect in the right skull from previous craniectomy.  Conjunctiva pink sclerae nonicteric  Neck-tracheostomy in the midline  Chest-equal bilateral breath sounds  CV-irregular tachycardia  Abdomen-PEJ-tube in the upper abdomen.  Extremities contracted    Lines/Drains/Airways       Peripherally Inserted Central Catheter Line  Duration             PICC Triple Lumen 01/06/25 1030 right brachial 1 day              Drain  Duration                  Gastrostomy/Enterostomy 10/30/24 1200 Gastrostomy-jejunostomy feeding 69 days         Urethral Catheter 01/05/25 2208 Temperature probe 2 days              Airway  Duration             Adult Surgical Airway 08/19/24 0120 Shiley Extra Large Cuffed Distal 6.0/ 75mm  142 days              Peripheral Intravenous Line  Duration                  Peripheral IV - Single Lumen 01/05/25 2139 20 G Left;Posterior Hand 2 days         Peripheral IV - Single Lumen 01/05/25 2252 20 G Posterior;Right Forearm 2 days         Peripheral IV - Single Lumen 01/06/25 0015 18 G 2 1/4 in Yes Anterior;Right Upper Arm 2 days                    Significant Labs:    Lab Results   Component Value Date    WBC 9.81 01/08/2025    HGB 8.8 (L) 01/08/2025    HCT 27.6 (L) 01/08/2025    MCV 89.0 01/08/2025     01/08/2025           BMP  Lab Results   Component Value Date     01/08/2025    K 2.9 (L) 01/08/2025    CO2 25 01/08/2025    BUN 20.3 01/08/2025    CREATININE 0.67 (L) 01/08/2025    CALCIUM 8.4 (L) 01/08/2025    AGAP 9.0 01/08/2025    EGFRNONAA 61 04/23/2022         ABG  Recent Labs   Lab 01/07/25  1439   PH 7.480*   PO2 96.0   PCO2 39.0   HCO3 29.0*   POCBASEDEF 5.10*       Mechanical Ventilation Support:  Vent Mode: PRVC A/C (01/08/25 0512)  Ventilator Initiated: Yes (01/05/25 2105)  Set Rate: 20 BPM (01/08/25 0512)  Vt Set: 400 mL (01/08/25 0512)  PEEP/CPAP: 5 cmH20 (01/08/25 0512)  Oxygen Concentration (%): 30 (01/08/25 0512)  Peak Airway Pressure: 29 cmH20 (01/08/25 0512)  Total Ve: 10.8 L/m (01/08/25 0512)  F/VT Ratio<105 (RSBI): (!) 63.73 (01/08/25 0512)      Significant Imaging:  I have reviewed the pertinent imaging within the past 24 hours.  Haziness in the right lung base consistent with pneumonia.  Tracheostomy tube in the midline of trachea      Assessment/Plan:     Assessment  Aspiration pneumonia  Chronic respiratory failure  History of hemorrhagic stroke, right craniectomy, and residual left-sided weakness  Recent admit with ESBL UTI  Shock probably secondary to above along with hypovolemia  History of chronic atrial fib now with RVR likely secondary to above  BRIAN-resolving  Pressure injury left foot      Plan  Replace potassium  Continue Merrem per ID  Continue present mechanical  ventilation.  Continue nutritional support.      The patient remains at high risk of decompensation and death and will remain in ICU level care    34 min of critical care time was spent reviewing the patient's chart including medications, radiographs, labs, pertinent cultures and pathology data, other consultant notes/recomendations as well as titration of vasopressors, adjustment of mechanical ventilatory or NIPPV support, as well as discussion of goals of care with nursing staff, respiratory therapy at the bedside and with family at the bedside/via phone.          ROBB Drake MD  Pulmonary Critical Care Medicine  [facility] - [facility] East ICU  DOS: 01/08/2025

## 2025-01-09 LAB
ALBUMIN SERPL-MCNC: 2.3 G/DL (ref 3.4–4.8)
ALBUMIN/GLOB SERPL: 0.5 RATIO (ref 1.1–2)
ALP SERPL-CCNC: 94 UNIT/L (ref 40–150)
ALT SERPL-CCNC: 6 UNIT/L (ref 0–55)
ANION GAP SERPL CALC-SCNC: 5 MEQ/L
AST SERPL-CCNC: 10 UNIT/L (ref 5–34)
BACTERIA BLD CULT: ABNORMAL
BASOPHILS # BLD AUTO: 0.04 X10(3)/MCL
BASOPHILS NFR BLD AUTO: 0.5 %
BILIRUB SERPL-MCNC: 0.5 MG/DL
BUN SERPL-MCNC: 14.5 MG/DL (ref 8.4–25.7)
CALCIUM SERPL-MCNC: 8.5 MG/DL (ref 8.8–10)
CHLORIDE SERPL-SCNC: 113 MMOL/L (ref 98–107)
CO2 SERPL-SCNC: 27 MMOL/L (ref 23–31)
CREAT SERPL-MCNC: 0.63 MG/DL (ref 0.72–1.25)
CREAT/UREA NIT SERPL: 23
EOSINOPHIL # BLD AUTO: 0.06 X10(3)/MCL (ref 0–0.9)
EOSINOPHIL NFR BLD AUTO: 0.8 %
ERYTHROCYTE [DISTWIDTH] IN BLOOD BY AUTOMATED COUNT: 15.6 % (ref 11.5–17)
GFR SERPLBLD CREATININE-BSD FMLA CKD-EPI: >60 ML/MIN/1.73/M2
GLOBULIN SER-MCNC: 4.6 GM/DL (ref 2.4–3.5)
GLUCOSE SERPL-MCNC: 125 MG/DL (ref 82–115)
GRAM STN SPEC: ABNORMAL
HCT VFR BLD AUTO: 30.9 % (ref 42–52)
HGB BLD-MCNC: 9.5 G/DL (ref 14–18)
IMM GRANULOCYTES # BLD AUTO: 0.09 X10(3)/MCL (ref 0–0.04)
IMM GRANULOCYTES NFR BLD AUTO: 1.2 %
LYMPHOCYTES # BLD AUTO: 1.2 X10(3)/MCL (ref 0.6–4.6)
LYMPHOCYTES NFR BLD AUTO: 16.1 %
MCH RBC QN AUTO: 27.9 PG (ref 27–31)
MCHC RBC AUTO-ENTMCNC: 30.7 G/DL (ref 33–36)
MCV RBC AUTO: 90.9 FL (ref 80–94)
MONOCYTES # BLD AUTO: 0.55 X10(3)/MCL (ref 0.1–1.3)
MONOCYTES NFR BLD AUTO: 7.4 %
NEUTROPHILS # BLD AUTO: 5.5 X10(3)/MCL (ref 2.1–9.2)
NEUTROPHILS NFR BLD AUTO: 74 %
NRBC BLD AUTO-RTO: 0 %
PLATELET # BLD AUTO: 255 X10(3)/MCL (ref 130–400)
PMV BLD AUTO: 10.3 FL (ref 7.4–10.4)
POTASSIUM SERPL-SCNC: 3.1 MMOL/L (ref 3.5–5.1)
PROT SERPL-MCNC: 6.9 GM/DL (ref 5.8–7.6)
RBC # BLD AUTO: 3.4 X10(6)/MCL (ref 4.7–6.1)
SODIUM SERPL-SCNC: 145 MMOL/L (ref 136–145)
WBC # BLD AUTO: 7.44 X10(3)/MCL (ref 4.5–11.5)

## 2025-01-09 PROCEDURE — 85025 COMPLETE CBC W/AUTO DIFF WBC: CPT

## 2025-01-09 PROCEDURE — 94761 N-INVAS EAR/PLS OXIMETRY MLT: CPT

## 2025-01-09 PROCEDURE — 99900035 HC TECH TIME PER 15 MIN (STAT)

## 2025-01-09 PROCEDURE — 20000000 HC ICU ROOM

## 2025-01-09 PROCEDURE — 99900026 HC AIRWAY MAINTENANCE (STAT)

## 2025-01-09 PROCEDURE — 36415 COLL VENOUS BLD VENIPUNCTURE: CPT

## 2025-01-09 PROCEDURE — 25000003 PHARM REV CODE 250: Performed by: INTERNAL MEDICINE

## 2025-01-09 PROCEDURE — 99900031 HC PATIENT EDUCATION (STAT)

## 2025-01-09 PROCEDURE — 25000003 PHARM REV CODE 250: Performed by: NURSE PRACTITIONER

## 2025-01-09 PROCEDURE — 94003 VENT MGMT INPAT SUBQ DAY: CPT

## 2025-01-09 PROCEDURE — 25000003 PHARM REV CODE 250: Performed by: STUDENT IN AN ORGANIZED HEALTH CARE EDUCATION/TRAINING PROGRAM

## 2025-01-09 PROCEDURE — 94760 N-INVAS EAR/PLS OXIMETRY 1: CPT

## 2025-01-09 PROCEDURE — 25000003 PHARM REV CODE 250

## 2025-01-09 PROCEDURE — 27100171 HC OXYGEN HIGH FLOW UP TO 24 HOURS

## 2025-01-09 PROCEDURE — 80053 COMPREHEN METABOLIC PANEL: CPT | Performed by: STUDENT IN AN ORGANIZED HEALTH CARE EDUCATION/TRAINING PROGRAM

## 2025-01-09 PROCEDURE — 27000207 HC ISOLATION

## 2025-01-09 PROCEDURE — 63600175 PHARM REV CODE 636 W HCPCS: Performed by: INTERNAL MEDICINE

## 2025-01-09 PROCEDURE — 27200966 HC CLOSED SUCTION SYSTEM

## 2025-01-09 RX ORDER — POTASSIUM CHLORIDE 14.9 MG/ML
20 INJECTION INTRAVENOUS
Status: COMPLETED | OUTPATIENT
Start: 2025-01-09 | End: 2025-01-09

## 2025-01-09 RX ADMIN — BUSPIRONE HYDROCHLORIDE 5 MG: 5 TABLET ORAL at 09:01

## 2025-01-09 RX ADMIN — FAMOTIDINE 20 MG: 10 INJECTION, SOLUTION INTRAVENOUS at 08:01

## 2025-01-09 RX ADMIN — BACITRACIN ZINC, NEOMYCIN, POLYMYXIN B: 400; 3.5; 5 OINTMENT TOPICAL at 08:01

## 2025-01-09 RX ADMIN — POTASSIUM CHLORIDE 20 MEQ: 14.9 INJECTION, SOLUTION INTRAVENOUS at 02:01

## 2025-01-09 RX ADMIN — MEROPENEM 2 G: 1 INJECTION, POWDER, FOR SOLUTION INTRAVENOUS at 02:01

## 2025-01-09 RX ADMIN — LEVETIRACETAM 1500 MG: 100 SOLUTION ORAL at 09:01

## 2025-01-09 RX ADMIN — POTASSIUM CHLORIDE 20 MEQ: 14.9 INJECTION, SOLUTION INTRAVENOUS at 09:01

## 2025-01-09 RX ADMIN — ATORVASTATIN CALCIUM 10 MG: 10 TABLET, FILM COATED ORAL at 08:01

## 2025-01-09 RX ADMIN — MUPIROCIN: 20 OINTMENT TOPICAL at 08:01

## 2025-01-09 RX ADMIN — POTASSIUM CHLORIDE 20 MEQ: 14.9 INJECTION, SOLUTION INTRAVENOUS at 06:01

## 2025-01-09 RX ADMIN — MUPIROCIN: 20 OINTMENT TOPICAL at 09:01

## 2025-01-09 RX ADMIN — LEVETIRACETAM 1500 MG: 100 SOLUTION ORAL at 08:01

## 2025-01-09 RX ADMIN — APIXABAN 5 MG: 5 TABLET, FILM COATED ORAL at 08:01

## 2025-01-09 RX ADMIN — AMIODARONE HYDROCHLORIDE 400 MG: 200 TABLET ORAL at 08:01

## 2025-01-09 RX ADMIN — BUSPIRONE HYDROCHLORIDE 5 MG: 5 TABLET ORAL at 08:01

## 2025-01-09 RX ADMIN — MEROPENEM 2 G: 1 INJECTION, POWDER, FOR SOLUTION INTRAVENOUS at 06:01

## 2025-01-09 RX ADMIN — MEROPENEM 2 G: 1 INJECTION, POWDER, FOR SOLUTION INTRAVENOUS at 09:01

## 2025-01-09 RX ADMIN — BUSPIRONE HYDROCHLORIDE 5 MG: 5 TABLET ORAL at 02:01

## 2025-01-09 RX ADMIN — POTASSIUM CHLORIDE 20 MEQ: 14.9 INJECTION, SOLUTION INTRAVENOUS at 05:01

## 2025-01-09 RX ADMIN — POTASSIUM CHLORIDE 20 MEQ: 14.9 INJECTION, SOLUTION INTRAVENOUS at 11:01

## 2025-01-09 RX ADMIN — APIXABAN 5 MG: 5 TABLET, FILM COATED ORAL at 09:01

## 2025-01-09 RX ADMIN — POTASSIUM BICARBONATE 25 MEQ: 978 TABLET, EFFERVESCENT ORAL at 08:01

## 2025-01-09 RX ADMIN — POTASSIUM BICARBONATE 25 MEQ: 978 TABLET, EFFERVESCENT ORAL at 10:01

## 2025-01-09 RX ADMIN — AMIODARONE HYDROCHLORIDE 400 MG: 200 TABLET ORAL at 09:01

## 2025-01-09 NOTE — PLAN OF CARE
Problem: Infection  Goal: Absence of Infection Signs and Symptoms  Outcome: Progressing     Problem: Adult Inpatient Plan of Care  Goal: Plan of Care Review  Outcome: Progressing  Goal: Patient-Specific Goal (Individualized)  Outcome: Progressing  Goal: Absence of Hospital-Acquired Illness or Injury  Outcome: Progressing  Goal: Optimal Comfort and Wellbeing  Outcome: Progressing  Goal: Readiness for Transition of Care  Outcome: Progressing     Problem: Wound  Goal: Optimal Coping  Outcome: Progressing  Goal: Optimal Functional Ability  Outcome: Progressing  Goal: Absence of Infection Signs and Symptoms  Outcome: Progressing  Goal: Improved Oral Intake  Outcome: Progressing  Goal: Optimal Pain Control and Function  Outcome: Progressing  Goal: Skin Health and Integrity  Outcome: Progressing  Goal: Optimal Wound Healing  Outcome: Progressing     Problem: Pneumonia  Goal: Fluid Balance  Outcome: Progressing  Goal: Resolution of Infection Signs and Symptoms  Outcome: Progressing  Goal: Effective Oxygenation and Ventilation  Outcome: Progressing     Problem: Skin Injury Risk Increased  Goal: Skin Health and Integrity  Outcome: Progressing     Problem: Fall Injury Risk  Goal: Absence of Fall and Fall-Related Injury  Outcome: Progressing

## 2025-01-09 NOTE — PLAN OF CARE
[facility] sent updates via eipic to [facility]   Notified Anika pt will return with IV antibiotics   Sent mar

## 2025-01-10 VITALS
HEIGHT: 67 IN | SYSTOLIC BLOOD PRESSURE: 131 MMHG | TEMPERATURE: 99 F | DIASTOLIC BLOOD PRESSURE: 92 MMHG | WEIGHT: 143.31 LBS | OXYGEN SATURATION: 100 % | RESPIRATION RATE: 25 BRPM | BODY MASS INDEX: 22.49 KG/M2 | HEART RATE: 100 BPM

## 2025-01-10 PROBLEM — A49.8 INFECTION DUE TO ESBL-PRODUCING KLEBSIELLA PNEUMONIAE: Status: ACTIVE | Noted: 2025-01-10

## 2025-01-10 PROBLEM — Z16.12 INFECTION DUE TO ESBL-PRODUCING KLEBSIELLA PNEUMONIAE: Status: ACTIVE | Noted: 2025-01-10

## 2025-01-10 LAB
ALBUMIN SERPL-MCNC: 2.2 G/DL (ref 3.4–4.8)
ALBUMIN/GLOB SERPL: 0.6 RATIO (ref 1.1–2)
ALP SERPL-CCNC: 78 UNIT/L (ref 40–150)
ALT SERPL-CCNC: 9 UNIT/L (ref 0–55)
ANION GAP SERPL CALC-SCNC: 4 MEQ/L
AST SERPL-CCNC: 15 UNIT/L (ref 5–34)
BACTERIA BLD CULT: NORMAL
BASOPHILS # BLD AUTO: 0.06 X10(3)/MCL
BASOPHILS NFR BLD AUTO: 0.6 %
BILIRUB SERPL-MCNC: 0.4 MG/DL
BUN SERPL-MCNC: 14.5 MG/DL (ref 8.4–25.7)
CALCIUM SERPL-MCNC: 8.1 MG/DL (ref 8.8–10)
CHLORIDE SERPL-SCNC: 114 MMOL/L (ref 98–107)
CO2 SERPL-SCNC: 23 MMOL/L (ref 23–31)
CREAT SERPL-MCNC: 0.58 MG/DL (ref 0.72–1.25)
CREAT/UREA NIT SERPL: 25
EOSINOPHIL # BLD AUTO: 0.19 X10(3)/MCL (ref 0–0.9)
EOSINOPHIL NFR BLD AUTO: 2 %
ERYTHROCYTE [DISTWIDTH] IN BLOOD BY AUTOMATED COUNT: 15.8 % (ref 11.5–17)
GFR SERPLBLD CREATININE-BSD FMLA CKD-EPI: >60 ML/MIN/1.73/M2
GLOBULIN SER-MCNC: 3.9 GM/DL (ref 2.4–3.5)
GLUCOSE SERPL-MCNC: 110 MG/DL (ref 82–115)
HCT VFR BLD AUTO: 30.8 % (ref 42–52)
HGB BLD-MCNC: 9.5 G/DL (ref 14–18)
IMM GRANULOCYTES # BLD AUTO: 0.3 X10(3)/MCL (ref 0–0.04)
IMM GRANULOCYTES NFR BLD AUTO: 3.2 %
LYMPHOCYTES # BLD AUTO: 2.48 X10(3)/MCL (ref 0.6–4.6)
LYMPHOCYTES NFR BLD AUTO: 26.3 %
MCH RBC QN AUTO: 27.8 PG (ref 27–31)
MCHC RBC AUTO-ENTMCNC: 30.8 G/DL (ref 33–36)
MCV RBC AUTO: 90.1 FL (ref 80–94)
MONOCYTES # BLD AUTO: 1.15 X10(3)/MCL (ref 0.1–1.3)
MONOCYTES NFR BLD AUTO: 12.2 %
NEUTROPHILS # BLD AUTO: 5.25 X10(3)/MCL (ref 2.1–9.2)
NEUTROPHILS NFR BLD AUTO: 55.7 %
NRBC BLD AUTO-RTO: 0 %
PLATELET # BLD AUTO: 231 X10(3)/MCL (ref 130–400)
PMV BLD AUTO: 10.6 FL (ref 7.4–10.4)
POTASSIUM SERPL-SCNC: 4.5 MMOL/L (ref 3.5–5.1)
PROT SERPL-MCNC: 6.1 GM/DL (ref 5.8–7.6)
RBC # BLD AUTO: 3.42 X10(6)/MCL (ref 4.7–6.1)
SODIUM SERPL-SCNC: 141 MMOL/L (ref 136–145)
WBC # BLD AUTO: 9.43 X10(3)/MCL (ref 4.5–11.5)

## 2025-01-10 PROCEDURE — 25000003 PHARM REV CODE 250: Performed by: INTERNAL MEDICINE

## 2025-01-10 PROCEDURE — 94003 VENT MGMT INPAT SUBQ DAY: CPT

## 2025-01-10 PROCEDURE — 36415 COLL VENOUS BLD VENIPUNCTURE: CPT | Performed by: STUDENT IN AN ORGANIZED HEALTH CARE EDUCATION/TRAINING PROGRAM

## 2025-01-10 PROCEDURE — 99900026 HC AIRWAY MAINTENANCE (STAT)

## 2025-01-10 PROCEDURE — 27200966 HC CLOSED SUCTION SYSTEM

## 2025-01-10 PROCEDURE — 27100171 HC OXYGEN HIGH FLOW UP TO 24 HOURS

## 2025-01-10 PROCEDURE — 85025 COMPLETE CBC W/AUTO DIFF WBC: CPT

## 2025-01-10 PROCEDURE — 99900035 HC TECH TIME PER 15 MIN (STAT)

## 2025-01-10 PROCEDURE — 25000003 PHARM REV CODE 250

## 2025-01-10 PROCEDURE — 80053 COMPREHEN METABOLIC PANEL: CPT | Performed by: STUDENT IN AN ORGANIZED HEALTH CARE EDUCATION/TRAINING PROGRAM

## 2025-01-10 PROCEDURE — 63600175 PHARM REV CODE 636 W HCPCS: Performed by: INTERNAL MEDICINE

## 2025-01-10 PROCEDURE — 99900022

## 2025-01-10 PROCEDURE — 99900031 HC PATIENT EDUCATION (STAT)

## 2025-01-10 PROCEDURE — 25000003 PHARM REV CODE 250: Performed by: STUDENT IN AN ORGANIZED HEALTH CARE EDUCATION/TRAINING PROGRAM

## 2025-01-10 PROCEDURE — 25000003 PHARM REV CODE 250: Performed by: NURSE PRACTITIONER

## 2025-01-10 PROCEDURE — 94760 N-INVAS EAR/PLS OXIMETRY 1: CPT

## 2025-01-10 RX ORDER — ERTAPENEM 1 G/1
1 INJECTION, POWDER, LYOPHILIZED, FOR SOLUTION INTRAMUSCULAR; INTRAVENOUS DAILY
Qty: 5 G | Refills: 0 | Status: SHIPPED | OUTPATIENT
Start: 2025-01-10 | End: 2025-01-15

## 2025-01-10 RX ORDER — METOPROLOL TARTRATE 25 MG/1
25 TABLET, FILM COATED ORAL 2 TIMES DAILY
Status: DISCONTINUED | OUTPATIENT
Start: 2025-01-10 | End: 2025-01-10 | Stop reason: HOSPADM

## 2025-01-10 RX ADMIN — FAMOTIDINE 20 MG: 10 INJECTION, SOLUTION INTRAVENOUS at 08:01

## 2025-01-10 RX ADMIN — BUSPIRONE HYDROCHLORIDE 5 MG: 5 TABLET ORAL at 08:01

## 2025-01-10 RX ADMIN — METOPROLOL TARTRATE 25 MG: 25 TABLET, FILM COATED ORAL at 08:01

## 2025-01-10 RX ADMIN — BACITRACIN ZINC, NEOMYCIN, POLYMYXIN B: 400; 3.5; 5 OINTMENT TOPICAL at 08:01

## 2025-01-10 RX ADMIN — AMIODARONE HYDROCHLORIDE 200 MG: 200 TABLET ORAL at 08:01

## 2025-01-10 RX ADMIN — MEROPENEM 2 G: 1 INJECTION, POWDER, FOR SOLUTION INTRAVENOUS at 05:01

## 2025-01-10 RX ADMIN — ATORVASTATIN CALCIUM 10 MG: 10 TABLET, FILM COATED ORAL at 08:01

## 2025-01-10 RX ADMIN — APIXABAN 5 MG: 5 TABLET, FILM COATED ORAL at 08:01

## 2025-01-10 RX ADMIN — LEVETIRACETAM 1500 MG: 100 SOLUTION ORAL at 08:01

## 2025-01-10 RX ADMIN — MUPIROCIN: 20 OINTMENT TOPICAL at 08:01

## 2025-01-10 NOTE — CARE UPDATE
[id number] Spoke with Anika with [facility] who reported She received all clinicals. Called [facility] and took pt out of will call. [facility] will be here in an hour or so

## 2025-01-10 NOTE — PLAN OF CARE
01/10/25 1153   Final Note   Assessment Type Discharge Planning Assessment   Anticipated Discharge Disposition [facility]   Hospital Resources/Appts/Education Provided Appointments scheduled and added to AVS   Post-Acute Status   Post-Acute Authorization Placement   Post-Acute Placement Status Set-up Complete/Auth obtained   Discharge Delays None known at this time     Pt is being dc back to [facility]. Pt is on a vent at the [facility].

## 2025-01-10 NOTE — DISCHARGE SUMMARY
[facility] Discharge Summary    Admitting Physician: Jamil Hutchins Jr., MD, [facility]  Attending Physician: KODI Drake MD  Date of Admit: 1/5/2025  Date of Discharge: 1/10/2025    Condition: Stable  Outcome: Condition has improved and patient is now ready for discharge.  DISPOSITION: Nursing Facility          Discharge Diagnoses     Patient Active Problem List   Diagnosis    Neuroendocrine carcinoma of small bowel    Nodule of left lung    Hypertension    Hyperlipidemia LDL goal <70    Hypokalemia    Coronary artery disease involving native heart without angina pectoris    Second degree AV block    Cardiac arrest with ventricular fibrillation    Cardiac pacemaker in situ    History of deep vein thrombosis (DVT) of lower extremity    Current use of long term anticoagulation    Bilateral lower extremity edema    Benign prostatic hyperplasia    Seizure    Sepsis    Nausea and vomiting    Esophagitis    Pneumonia due to infectious organism    UTI (urinary tract infection)    Leukocytosis    Unstageable pressure ulcer of sacral region    Pressure injury of left knee, stage 3    Pressure ulcer of left ankle, stage 4    Pressure ulcer of sacral region, stage 4    Infection due to ESBL-producing Klebsiella pneumoniae       Principal Problem:  Infection due to ESBL-producing Klebsiella pneumoniae    Consultants and Procedures     Consultants:  IP CONS[facility] TO [facility] TEAM [facility])  WOUND CARE CONSULT  IP CONSULT TO CARDIOLOGY  IP CONSULT TO RESPIRATORY CARE  IP CONSULT TO INFECTIOUS DISEASES  IP CONSULT TO SOCIAL WORK/CASE MANAGEMENT    Procedures:   * No surgery found *     Brief Admission History      69-year-old from [facility] was sent to the emergency department yesterday with tachypnea and increased lethargy.  He was found to be in AFib with RVR.  Was also hypotensive despite volume resuscitation and was placed on Levophed and amiodarone and admitted to ICU.  Patient is nonverbal with a  "tracheostomy and J-tube in place.  He is on a ventilator at [facility].     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  Permanent pacemaker/defibrillator and a STEMI in 2003.  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  Recent hospitalization at [facility] in November 2024 for multidrug resistant UTI and volume depletion.  He has a chronic sacral decubitus.  Also has history of aspiration pneumonia.    Hospital Course with Pertinent Findings     The patient was admitted for aspiration pneumonia and AFib with RVR, was started on broad-spectrum antibiotics with meropenem due to history of ESBL and on amiodarone drip.  His RVR resolved and the patient went AFib with rate control.  Blood cultures grew ESBL Klebsiella pneumoniae.  The patient continued to improve and is now stable for discharge.  We will continue outpatient treatment with ertapenem through 01/14/2024.    Discharge physical exam:  Vitals  BP: 124/78  Temp: 99 °F (37.2 °C)  Temp Source: Oral  Pulse: 98  Resp: (!) 22  SpO2: 100 %  Height: 5' 7.01" (170.2 cm)  Weight: 65 kg (143 lb 4.8 oz)    Physical Exam  Cardiovascular:      Rate and Rhythm: Normal rate. Rhythm irregular.      Pulses: Normal pulses.      Heart sounds: Normal heart sounds.   Pulmonary:      Breath sounds: Rhonchi present.   Abdominal:      General: Bowel sounds are normal.      Palpations: Abdomen is soft.   Skin:     General: Skin is warm and dry.   Neurological:      Mental Status: He is alert. He is disoriented.         TIME SPENT ON DISCHARGE: 60 minutes    Discharge Medications        Medication List        START taking these medications      apixaban 5 mg Tab  Commonly known as: ELIQUIS  Take 1 tablet (5 mg total) by mouth 2 (two) times daily.     ertapenem 1 gram injection  Commonly known as: INVANZ  Inject 1 g into the muscle once daily. for 5 days            CONTINUE " taking these medications      amiodarone 200 MG Tab  Commonly known as: PACERONE  1 tablet (200 mg total) by Per G Tube route once daily.     ascorbic Acid 500 mg Cpsr  Commonly known as: VITAMIN C     busPIRone 5 MG Tab  Commonly known as: BUSPAR     cholestyramine 4 gram packet  Commonly known as: QUESTRAN     cholestyramine-aspartame 4 gram Pwpk  Commonly known as: QUESTRAN LIGHT  1 packet (4 g total) by Per G Tube route 2 (two) times daily.     finasteride 5 mg tablet  Commonly known as: PROSCAR  1 tablet (5 mg total) by Per G Tube route once daily.     FLORANEX ORAL     furosemide 80 MG tablet  Commonly known as: LASIX     levetiracetam 500 mg/5 mL (5 mL) Soln     LIPITOR 10 mg tablet  Generic drug: atorvastatin     metoprolol tartrate 25 MG tablet  Commonly known as: LOPRESSOR  1 tablet (25 mg total) by Per G Tube route 2 (two) times daily.     miconazole NITRATE 2 % 2 % top powder  Commonly known as: MICOTIN  Apply topically 2 (two) times daily.     multivitamin per tablet  Commonly known as: THERAGRAN     polyethylene glycol 17 gram Pwpk  Commonly known as: GLYCOLAX  17 g by Per G Tube route 2 (two) times daily as needed for Constipation.     PROMOD PROTEIN ORAL     QUEtiapine 25 MG Tab  Commonly known as: SEROQUEL     scopolamine 1.3-1.5 mg (1 mg over 3 days)  Commonly known as: TRANSDERM-SCOP     sucralfate 1 gram tablet  Commonly known as: CARAFATE  1 tablet (1 g total) by Per G Tube route 4 (four) times daily before meals and nightly.     venlafaxine 75 mg Tr24               Where to Get Your Medications        These medications were sent to [facility] - [address] [address]      Phone: [phone]   apixaban 5 mg Tab  ertapenem 1 gram injection         Discharge Information:     Continue previous medication  Start Eliquis 5 b.i.d.  Continue ertapenem with 5 doses from 01/10 to 1/14  Discharge from nursing facility    Yuniel Wise  MD  Internal Medicine - PGY-2

## 2025-01-10 NOTE — PLAN OF CARE
Problem: Infection  Goal: Absence of Infection Signs and Symptoms  1/9/2025 1821 by Eleni Hinkle RN  Outcome: Progressing  1/9/2025 0943 by Eleni Hinkle RN  Outcome: Progressing     Problem: Adult Inpatient Plan of Care  Goal: Plan of Care Review  1/9/2025 1821 by Eleni Hinkle RN  Outcome: Progressing  1/9/2025 0943 by Eleni Hinkle RN  Outcome: Progressing  Goal: Patient-Specific Goal (Individualized)  1/9/2025 1821 by Eleni Hinkle RN  Outcome: Progressing  1/9/2025 0943 by Eleni Hinkle RN  Outcome: Progressing  Goal: Absence of Hospital-Acquired Illness or Injury  1/9/2025 1821 by Eleni Hinkle RN  Outcome: Progressing  1/9/2025 0943 by Eleni Hinkle RN  Outcome: Progressing  Goal: Optimal Comfort and Wellbeing  1/9/2025 1821 by Eleni Hinkle RN  Outcome: Progressing  1/9/2025 0943 by Eleni Hinkle RN  Outcome: Progressing     Problem: Wound  Goal: Optimal Coping  1/9/2025 1821 by Eleni Hinkle RN  Outcome: Progressing  1/9/2025 0943 by Eleni Hinkle RN  Outcome: Progressing  Goal: Absence of Infection Signs and Symptoms  1/9/2025 1821 by Eleni Hinkle RN  Outcome: Progressing  1/9/2025 0943 by Eleni Hinkle RN  Outcome: Progressing  Goal: Optimal Pain Control and Function  1/9/2025 1821 by Eleni Hinkle RN  Outcome: Progressing  1/9/2025 0943 by Eleni Hinkle RN  Outcome: Progressing  Goal: Skin Health and Integrity  1/9/2025 1821 by Eleni Hinkle RN  Outcome: Progressing  1/9/2025 0943 by Eleni Hinkle RN  Outcome: Progressing  Goal: Optimal Wound Healing  1/9/2025 1821 by Eleni Hinkle RN  Outcome: Progressing  1/9/2025 0943 by Eleni Hinkle RN  Outcome: Progressing     Problem: Pneumonia  Goal: Fluid Balance  1/9/2025 1821 by Eleni Hinkle RN  Outcome: Progressing  1/9/2025 0943 by Eleni Hinkle RN  Outcome: Progressing  Goal: Resolution of Infection Signs and Symptoms  1/9/2025 1821 by Eleni Hinkle RN  Outcome: Progressing  1/9/2025 0943 by Eleni Hinkle RN  Outcome:  Progressing  Goal: Effective Oxygenation and Ventilation  1/9/2025 1821 by Eleni Hinkle RN  Outcome: Progressing  1/9/2025 0943 by Eleni Hinkle RN  Outcome: Progressing     Problem: Skin Injury Risk Increased  Goal: Skin Health and Integrity  1/9/2025 1821 by Eleni Hinkle RN  Outcome: Progressing  1/9/2025 0943 by Eleni Hinkle RN  Outcome: Progressing     Problem: Fall Injury Risk  Goal: Absence of Fall and Fall-Related Injury  1/9/2025 1821 by Eleni Hinkle RN  Outcome: Progressing  1/9/2025 0943 by Eleni Hinkle RN  Outcome: Progressing     Problem: Adult Inpatient Plan of Care  Goal: Readiness for Transition of Care  1/9/2025 1821 by Eleni Hinkle RN  Outcome: Not Progressing  1/9/2025 0943 by Eleni Hinkle RN  Outcome: Progressing     Problem: Wound  Goal: Optimal Functional Ability  1/9/2025 1821 by Eleni Hinkle RN  Outcome: Not Progressing  1/9/2025 0943 by Eleni Hinkle RN  Outcome: Progressing  Goal: Improved Oral Intake  1/9/2025 1821 by Eleni Hinkle RN  Outcome: Not Progressing  1/9/2025 0943 by Eleni Hinkle RN  Outcome: Progressing

## 2025-01-10 NOTE — PROGRESS NOTES
[facility] - [facility] ICU  Pulmonary Critical Care Note    Patient Name: Delio Daniel Jr.  MRN: [id number]  Admission Date: 1/5/2025  Hospital Length of Stay: 5 days  Code Status: Full Code  Attending Provider: KODI Drake MD  Primary Care Provider: No primary care provider on file.     Subjective:     HPI:   69-year-old from [facility] was sent to the emergency department yesterday with tachypnea and increased lethargy.  He was found to be in AFib with RVR.  Was also hypotensive despite volume resuscitation and was placed on Levophed and amiodarone and admitted to ICU.  Patient is nonverbal with a tracheostomy and J-tube in place.  He is on a ventilator at [facility].    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  Permanent pacemaker/defibrillator and a STEMI in 2003.  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  Recent hospitalization at [facility] in November 2024 for multidrug resistant UTI and volume depletion.  He has a chronic sacral decubitus.  Also has history of aspiration pneumonia.        Today:  Acute events overnight.  Remains afebrile, no significant leukocytosis.  Blood cultures 01/05/2025 consistent with ESBL Klebsiella.  Infectious Disease consulted, remains on meropenem.  Heart rate control overall significantly improved, currently 110s.  Remains off norepinephrine since yesterday evening.  Mental status appears significantly improved from prior.        Review of systems unobtainable 2/2 altered mental status.         Past Medical History:   Diagnosis Date    Arthritis     Atrial fibrillation     BPH (benign prostatic hyperplasia)     Cardiac arrest     Coronary artery disease     Cyst, kidney, acquired     Diverticulosis     Hyperlipidemia     Hypertension     MI (myocardial infarction)     Obesity     Steatosis of liver     Stroke        Past  Surgical History:   Procedure Laterality Date    A-V CARDIAC PACEMAKER INSERTION Right     CARDIAC CATHETERIZATION      COLONOSCOPY W/ BIOPSIES      CRANIECTOMY Right 12/20/2023    Procedure: CRANIECTOMY;  Surgeon: Artem Can MD;  Location: [facility];  Service: Neurosurgery;  Laterality: Right;    ESOPHAGOGASTRODUODENOSCOPY W/ PEG N/A 1/2/2024    Procedure: PEG;  Surgeon: Tani Day MD;  Location: [facility] ENDOSCOPY;  Service: Gastroenterology;  Laterality: N/A;    ESOPHAGOGASTRODUODENOSCOPY W/ PEG N/A 10/30/2024    Procedure: EGD w/ Jtube placement;  Surgeon: Gabriele Salcido MD;  Location: [facility] ENDOSCOPY;  Service: Endoscopy;  Laterality: N/A;  with J tube extension    excision of colon      TRACHEOSTOMY N/A 12/29/2023    Procedure: CREATION, TRACHEOSTOMY;  Surgeon: Patricia Winslow MD;  Location: [facility];  Service: ENT;  Laterality: N/A;  REQ 1130 //  NEEDS 2 SCRUBS       Social History     Socioeconomic History    Marital status:     Number of children: 9   Occupational History    Occupation: retired   Tobacco Use    Smoking status: Never    Smokeless tobacco: Never   Substance and Sexual Activity    Alcohol use: Not Currently    Drug use: Not Currently    Sexual activity: Not Currently     Partners: Female     Social Drivers of Health     Financial Resource Strain: Patient Unable To Answer (1/6/2025)    Overall Financial Resource Strain (CARDIA)     Difficulty of Paying Living Expenses: Patient unable to answer   Food Insecurity: Patient Unable To Answer (1/6/2025)    Hunger Vital Sign     Worried About Running Out of Food in the Last Year: Patient unable to answer     Ran Out of Food in the Last Year: Patient unable to answer   Transportation Needs: Patient Unable To Answer (1/6/2025)    TRANSPORTATION NEEDS     Transportation : Patient unable to answer   Physical Activity: Sufficiently Active (8/5/2024)    Exercise Vital Sign     Days of Exercise per Week: 5 days     Minutes of  Exercise per Session: 30 min   Stress: Patient Unable To Answer (1/6/2025)    [employer] - Occupational Stress Questionnaire     Feeling of Stress : Patient unable to answer   Housing Stability: Patient Unable To Answer (1/6/2025)    Housing Stability Vital Sign     Unable to Pay for Housing in the Last Year: Patient unable to answer     Homeless in the Last Year: Patient unable to answer       Current Outpatient Medications   Medication Instructions    amiodarone (PACERONE) 200 mg, Per G Tube, Daily    ascorbic Acid (VITAMIN C) 500 mg, 2 times daily, Per PEG tube    busPIRone (BUSPAR) 5 mg, Per G Tube, 3 times daily    cholestyramine (QUESTRAN) 4 gram packet 4 g, Daily, Via PEG tube    cholestyramine-aspartame (QUESTRAN LIGHT) 4 gram PwPk 4 g, Per G Tube, 2 times daily    finasteride (PROSCAR) 5 mg, Per G Tube, Daily    furosemide (LASIX) 80 mg, Per G Tube, As needed (PRN)    L. acidophilus/L.bulgaricus (FLORANEX ORAL) 1 packet, PEG Tube, Daily    levetiracetam 1,500 mg, Per G Tube, 2 times daily, Nursing home reports medication hold by MD until level redraw on Monday.    LIPITOR 10 mg, Per G Tube, Daily    metoprolol tartrate (LOPRESSOR) 25 mg, Per G Tube, 2 times daily    miconazole NITRATE 2 % (MICOTIN) 2 % top powder Topical (Top), 2 times daily    multivitamin (THERAGRAN) per tablet 1 tablet, Per G Tube, Daily    polyethylene glycol (GLYCOLAX) 17 g, Per G Tube, 2 times daily PRN    protein supplement (PROMOD PROTEIN ORAL) 30 mLs, Per G Tube, Daily    QUEtiapine (SEROQUEL) 25 mg, Per G Tube, 2 times daily    scopolamine (TRANSDERM-SCOP) 1.3-1.5 mg (1 mg over 3 days) 1 patch, Transdermal, Every 3 days    sucralfate (CARAFATE) 1 g, Per G Tube, Before meals & nightly    venlafaxine 75 mg TR24 1 tablet, Per G Tube, 2 times daily       Current Inpatient Medications   amiodarone  200 mg Oral BID    Followed by    [START ON 1/13/2025] amiodarone  200 mg Oral Daily    apixaban  5 mg Oral  BID    atorvastatin  10 mg Per G Tube Daily    busPIRone  5 mg Per G Tube TID    famotidine (PF)  20 mg Intravenous Daily    levetiracetam  1,500 mg Per G Tube BID    meropenem IV (PEDS and ADULTS)  2 g Intravenous Q8H    mupirocin   Nasal BID    neomycin-bacitracin-polymyxin   Topical (Top) Daily       Current Intravenous Infusions   NORepinephrine bitartrate-D5W  0-3 mcg/kg/min Intravenous Continuous   Stopped at 01/06/25 1802       Objective:       Intake/Output Summary (Last 24 hours) at 1/10/2025 0606  Last data filed at 1/10/2025 0548  Gross per 24 hour   Intake 3193.52 ml   Output 1825 ml   Net 1368.52 ml       Vital Signs (Most Recent):  Temp: 99 °F (37.2 °C) (01/10/25 0400)  Pulse: (!) 125 (01/10/25 0500)  Resp: (!) 23 (01/10/25 0500)  BP: (!) 147/125 (01/10/25 0500)  SpO2: 100 % (01/10/25 0500)  Body mass index is 22.44 kg/m².  Weight: 65 kg (143 lb 4.8 oz) Vital Signs (24h Range):  Temp:  [98.7 °F (37.1 °C)-99.3 °F (37.4 °C)] 99 °F (37.2 °C)  Pulse:  [] 125  Resp:  [20-35] 23  SpO2:  [77 %-100 %] 100 %  BP: ()/() 147/125         Physical Exam:  Gen- chronically ill appearing, opens eyes and tracks examiner   HENT- MMM, well healed prior right-sided craniotomy  CV- tachycardic, irregularly irregular rhythm  Resp- CTAB, compliant with MV  MSK- contracted left upper and lower extremities  Neuro- opens eyes and tracks examiner; squeezes fingers right upper extremity, otherwise no significant command following; left upper extremity and left lower extremity contracted  Psych- unable to assess 2/2 AMS        Lines/Drains/Airways       Peripherally Inserted Central Catheter Line  Duration             PICC Triple Lumen 01/06/25 1030 right brachial 3 days              Drain  Duration                  Gastrostomy/Enterostomy 10/30/24 1200 Gastrostomy-jejunostomy feeding 71 days         Urethral Catheter 01/05/25 2208 Temperature probe 4 days         Fecal Incontinence  01/09/25 0543 1 day               Airway  Duration             Adult Surgical Airway 08/19/24 0120 Wendi Extra Large Cuffed Distal 6.0/ 75mm 144 days              Peripheral Intravenous Line  Duration                  Peripheral IV - Single Lumen 01/05/25 2139 20 G Left;Posterior Hand 4 days         Peripheral IV - Single Lumen 01/05/25 2252 20 G Posterior;Right Forearm 4 days         Peripheral IV - Single Lumen 01/06/25 0015 18 G 2 1/4 in Yes Anterior;Right Upper Arm 4 days                    Significant Labs:    Lab Results   Component Value Date    WBC 7.44 01/09/2025    HGB 9.5 (L) 01/09/2025    HCT 30.9 (L) 01/09/2025    MCV 90.9 01/09/2025     01/09/2025       BMP  Lab Results   Component Value Date     01/09/2025    K 3.1 (L) 01/09/2025    CO2 27 01/09/2025    BUN 14.5 01/09/2025    CREATININE 0.63 (L) 01/09/2025    CALCIUM 8.5 (L) 01/09/2025    AGAP 5.0 01/09/2025    EGFRNONAA 61 04/23/2022       ABG  Recent Labs   Lab 01/07/25  1439   PH 7.480*   PO2 96.0   PCO2 39.0   HCO3 29.0*   POCBASEDEF 5.10*       Mechanical Ventilation Support:  Vent Mode: PRVC (01/10/25 0525)  Ventilator Initiated: Yes (01/05/25 2105)  Set Rate: 20 BPM (01/10/25 0525)  Vt Set: 400 mL (01/10/25 0525)  PEEP/CPAP: 5 cmH20 (01/10/25 0525)  Oxygen Concentration (%): 30 (01/10/25 0525)  Peak Airway Pressure: 23 cmH20 (01/10/25 0525)  Total Ve: 11.4 L/m (01/10/25 0525)  F/VT Ratio<105 (RSBI): (!) 57.42 (01/09/25 1300)      Assessment/Plan:     Assessment  Aspiration pneumonia  ESBL Klebsiella bacteremia   Septic shock 2/2 above   Chronic respiratory failure  History of hemorrhagic stroke, right craniectomy, and residual left-sided weakness  Recent admit with ESBL UTI  History of chronic atrial fib now with RVR likely secondary to above  BRIAN-resolving      Plan  -recheck K this AM, goal K>4, Mg>2 for afib with RVR  -start metoprolol 25mg BID given borderline HR control (home med), continue amiodarone and eliquis per cardiology  -continue  meropenem per ID  -mental status believed to be at baseline  -likely DC to nursing home today         DVT ppx: eliquis  GI ppx: none          Yon Donahue MD  Pulmonary Critical Care Medicine  [facility] - [facility] East ICU  DOS: 01/10/2025

## 2025-01-10 NOTE — PLAN OF CARE
Problem: Infection  Goal: Absence of Infection Signs and Symptoms  Outcome: Progressing     Problem: Adult Inpatient Plan of Care  Goal: Plan of Care Review  Outcome: Progressing  Goal: Patient-Specific Goal (Individualized)  Outcome: Progressing  Goal: Absence of Hospital-Acquired Illness or Injury  Outcome: Progressing  Goal: Optimal Comfort and Wellbeing  Outcome: Progressing  Goal: Readiness for Transition of Care  Outcome: Progressing     Problem: Wound  Goal: Optimal Coping  Outcome: Progressing  Goal: Optimal Functional Ability  Outcome: Progressing  Goal: Absence of Infection Signs and Symptoms  Outcome: Progressing  Goal: Optimal Pain Control and Function  Outcome: Progressing  Goal: Skin Health and Integrity  Outcome: Progressing  Goal: Optimal Wound Healing  Outcome: Progressing     Problem: Pneumonia  Goal: Fluid Balance  Outcome: Progressing  Goal: Resolution of Infection Signs and Symptoms  Outcome: Progressing  Goal: Effective Oxygenation and Ventilation  Outcome: Progressing     Problem: Skin Injury Risk Increased  Goal: Skin Health and Integrity  Outcome: Progressing     Problem: Fall Injury Risk  Goal: Absence of Fall and Fall-Related Injury  Outcome: Progressing     Problem: Wound  Goal: Improved Oral Intake  Outcome: Not Progressing

## 2025-01-12 LAB
BACTERIA BLD CULT: NORMAL
BACTERIA BLD CULT: NORMAL

## 2025-01-15 ENCOUNTER — LAB REQUISITION (OUTPATIENT)
Dept: LAB | Facility: HOSPITAL | Age: 70
End: 2025-01-15
Payer: MEDICARE

## 2025-01-15 DIAGNOSIS — K92.1 MELENA: ICD-10-CM

## 2025-01-15 LAB
COLOR STL: YELLOW
CONSISTENCY STL: ABNORMAL
HEMOCCULT SP1 STL QL: POSITIVE

## 2025-01-15 PROCEDURE — 82270 OCCULT BLOOD FECES: CPT | Performed by: INTERNAL MEDICINE

## 2025-01-17 PROCEDURE — 82270 OCCULT BLOOD FECES: CPT | Performed by: INTERNAL MEDICINE

## 2025-01-19 ENCOUNTER — LAB REQUISITION (OUTPATIENT)
Dept: LAB | Facility: HOSPITAL | Age: 70
End: 2025-01-19
Payer: MEDICARE

## 2025-01-19 DIAGNOSIS — D64.9 ANEMIA, UNSPECIFIED: ICD-10-CM

## 2025-01-19 LAB
COLOR STL: ABNORMAL
COLOR STL: NORMAL
CONSISTENCY STL: ABNORMAL
CONSISTENCY STL: NORMAL
HEMOCCULT SP1 STL QL: POSITIVE
HEMOCCULT SP2 STL QL: NEGATIVE

## 2025-01-31 ENCOUNTER — LAB REQUISITION (OUTPATIENT)
Dept: LAB | Facility: HOSPITAL | Age: 70
End: 2025-01-31
Payer: MEDICARE

## 2025-01-31 DIAGNOSIS — K21.9 GASTRO-ESOPHAGEAL REFLUX DISEASE WITHOUT ESOPHAGITIS: ICD-10-CM

## 2025-01-31 LAB
ANION GAP SERPL CALC-SCNC: 14 MEQ/L
BASOPHILS # BLD AUTO: 0.04 X10(3)/MCL
BASOPHILS NFR BLD AUTO: 0.3 %
BUN SERPL-MCNC: 26 MG/DL (ref 8.4–25.7)
CALCIUM SERPL-MCNC: 8.4 MG/DL (ref 8.8–10)
CHLORIDE SERPL-SCNC: 103 MMOL/L (ref 98–107)
CO2 SERPL-SCNC: 28 MMOL/L (ref 23–31)
CREAT SERPL-MCNC: 0.81 MG/DL (ref 0.72–1.25)
CREAT/UREA NIT SERPL: 32
EOSINOPHIL # BLD AUTO: 0.05 X10(3)/MCL (ref 0–0.9)
EOSINOPHIL NFR BLD AUTO: 0.4 %
ERYTHROCYTE [DISTWIDTH] IN BLOOD BY AUTOMATED COUNT: 15.2 % (ref 11.5–17)
GFR SERPLBLD CREATININE-BSD FMLA CKD-EPI: >60 ML/MIN/1.73/M2
GLUCOSE SERPL-MCNC: 86 MG/DL (ref 82–115)
HCT VFR BLD AUTO: 27.1 % (ref 42–52)
HGB BLD-MCNC: 8.7 G/DL (ref 14–18)
IMM GRANULOCYTES # BLD AUTO: 0.08 X10(3)/MCL (ref 0–0.04)
IMM GRANULOCYTES NFR BLD AUTO: 0.6 %
LYMPHOCYTES # BLD AUTO: 1.89 X10(3)/MCL (ref 0.6–4.6)
LYMPHOCYTES NFR BLD AUTO: 15.3 %
MCH RBC QN AUTO: 27.1 PG (ref 27–31)
MCHC RBC AUTO-ENTMCNC: 32.1 G/DL (ref 33–36)
MCV RBC AUTO: 84.4 FL (ref 80–94)
MONOCYTES # BLD AUTO: 1.29 X10(3)/MCL (ref 0.1–1.3)
MONOCYTES NFR BLD AUTO: 10.5 %
NEUTROPHILS # BLD AUTO: 8.98 X10(3)/MCL (ref 2.1–9.2)
NEUTROPHILS NFR BLD AUTO: 72.9 %
NRBC BLD AUTO-RTO: 0 %
PLATELET # BLD AUTO: 276 X10(3)/MCL (ref 130–400)
PMV BLD AUTO: 10.1 FL (ref 7.4–10.4)
POTASSIUM SERPL-SCNC: 3.1 MMOL/L (ref 3.5–5.1)
RBC # BLD AUTO: 3.21 X10(6)/MCL (ref 4.7–6.1)
SODIUM SERPL-SCNC: 145 MMOL/L (ref 136–145)
WBC # BLD AUTO: 12.33 X10(3)/MCL (ref 4.5–11.5)

## 2025-01-31 PROCEDURE — 80048 BASIC METABOLIC PNL TOTAL CA: CPT | Performed by: INTERNAL MEDICINE

## 2025-01-31 PROCEDURE — 85025 COMPLETE CBC W/AUTO DIFF WBC: CPT | Performed by: INTERNAL MEDICINE

## 2025-02-06 ENCOUNTER — LAB REQUISITION (OUTPATIENT)
Dept: LAB | Facility: HOSPITAL | Age: 70
End: 2025-02-06
Payer: MEDICARE

## 2025-02-06 DIAGNOSIS — E86.0 DEHYDRATION: ICD-10-CM

## 2025-02-06 LAB
25(OH)D3+25(OH)D2 SERPL-MCNC: 48 NG/ML (ref 30–80)
ABS NEUT CALC (OHS): 12.67 X10(3)/MCL (ref 2.1–9.2)
ALBUMIN SERPL-MCNC: 2.3 G/DL (ref 3.4–4.8)
ALBUMIN/GLOB SERPL: 0.5 RATIO (ref 1.1–2)
ALP SERPL-CCNC: 84 UNIT/L (ref 40–150)
ALT SERPL-CCNC: 9 UNIT/L (ref 0–55)
ANION GAP SERPL CALC-SCNC: 15 MEQ/L
AST SERPL-CCNC: 19 UNIT/L (ref 5–34)
BILIRUB SERPL-MCNC: 1.2 MG/DL
BUN SERPL-MCNC: 19.6 MG/DL (ref 8.4–25.7)
CALCIUM SERPL-MCNC: 8 MG/DL (ref 8.8–10)
CHLORIDE SERPL-SCNC: 101 MMOL/L (ref 98–107)
CHOLEST SERPL-MCNC: 69 MG/DL
CHOLEST/HDLC SERPL: 3 {RATIO} (ref 0–5)
CO2 SERPL-SCNC: 26 MMOL/L (ref 23–31)
CREAT SERPL-MCNC: 0.86 MG/DL (ref 0.72–1.25)
CREAT/UREA NIT SERPL: 23
ERYTHROCYTE [DISTWIDTH] IN BLOOD BY AUTOMATED COUNT: 15.2 % (ref 11.5–17)
GFR SERPLBLD CREATININE-BSD FMLA CKD-EPI: >60 ML/MIN/1.73/M2
GLOBULIN SER-MCNC: 5.1 GM/DL (ref 2.4–3.5)
GLUCOSE SERPL-MCNC: 81 MG/DL (ref 82–115)
HCT VFR BLD AUTO: 28.6 % (ref 42–52)
HDLC SERPL-MCNC: 23 MG/DL (ref 35–60)
HGB BLD-MCNC: 8.9 G/DL (ref 14–18)
IRON SATN MFR SERPL: 11 % (ref 20–50)
IRON SERPL-MCNC: 21 UG/DL (ref 65–175)
LDLC SERPL CALC-MCNC: 33 MG/DL (ref 50–140)
LYMPHOCYTES NFR BLD MANUAL: 16 % (ref 13–40)
LYMPHOCYTES NFR BLD MANUAL: 2.9 X10(3)/MCL
MCH RBC QN AUTO: 26.7 PG (ref 27–31)
MCHC RBC AUTO-ENTMCNC: 31.1 G/DL (ref 33–36)
MCV RBC AUTO: 85.9 FL (ref 80–94)
MONOCYTES NFR BLD MANUAL: 14 % (ref 2–11)
MONOCYTES NFR BLD MANUAL: 2.53 X10(3)/MCL (ref 0.1–1.3)
NEUTROPHILS NFR BLD MANUAL: 70 % (ref 47–80)
NRBC BLD AUTO-RTO: 0 %
PLATELET # BLD AUTO: 362 X10(3)/MCL (ref 130–400)
PLATELET # BLD EST: ADEQUATE 10*3/UL
PMV BLD AUTO: 11 FL (ref 7.4–10.4)
POTASSIUM SERPL-SCNC: 3.2 MMOL/L (ref 3.5–5.1)
PREALB SERPL-MCNC: 11.3 MG/DL (ref 16–42)
PROT SERPL-MCNC: 7.4 GM/DL (ref 5.8–7.6)
RBC # BLD AUTO: 3.33 X10(6)/MCL (ref 4.7–6.1)
RBC MORPH BLD: NORMAL
SODIUM SERPL-SCNC: 142 MMOL/L (ref 136–145)
TIBC SERPL-MCNC: 165 UG/DL (ref 60–240)
TIBC SERPL-MCNC: 186 UG/DL (ref 250–450)
TRANSFERRIN SERPL-MCNC: 169 MG/DL (ref 163–344)
TRIGL SERPL-MCNC: 67 MG/DL (ref 34–140)
VLDLC SERPL CALC-MCNC: 13 MG/DL
WBC # BLD AUTO: 18.1 X10(3)/MCL (ref 4.5–11.5)

## 2025-02-06 PROCEDURE — 80053 COMPREHEN METABOLIC PANEL: CPT | Performed by: INTERNAL MEDICINE

## 2025-02-06 PROCEDURE — 82306 VITAMIN D 25 HYDROXY: CPT | Performed by: INTERNAL MEDICINE

## 2025-02-06 PROCEDURE — 84134 ASSAY OF PREALBUMIN: CPT | Performed by: INTERNAL MEDICINE

## 2025-02-06 PROCEDURE — 83540 ASSAY OF IRON: CPT | Performed by: INTERNAL MEDICINE

## 2025-02-06 PROCEDURE — 85025 COMPLETE CBC W/AUTO DIFF WBC: CPT | Performed by: INTERNAL MEDICINE

## 2025-02-06 PROCEDURE — 80061 LIPID PANEL: CPT | Performed by: INTERNAL MEDICINE

## 2025-02-07 ENCOUNTER — LAB REQUISITION (OUTPATIENT)
Dept: LAB | Facility: HOSPITAL | Age: 70
End: 2025-02-07
Payer: MEDICARE

## 2025-02-07 DIAGNOSIS — D72.829 ELEVATED WHITE BLOOD CELL COUNT, UNSPECIFIED: ICD-10-CM

## 2025-02-07 LAB
ANION GAP SERPL CALC-SCNC: 15 MEQ/L
BACTERIA #/AREA URNS AUTO: ABNORMAL /HPF
BILIRUB UR QL STRIP.AUTO: NEGATIVE
BUN SERPL-MCNC: 22.3 MG/DL (ref 8.4–25.7)
CALCIUM SERPL-MCNC: 8 MG/DL (ref 8.8–10)
CHLORIDE SERPL-SCNC: 100 MMOL/L (ref 98–107)
CLARITY UR: ABNORMAL
CO2 SERPL-SCNC: 27 MMOL/L (ref 23–31)
COLOR UR AUTO: ABNORMAL
CREAT SERPL-MCNC: 0.86 MG/DL (ref 0.72–1.25)
CREAT/UREA NIT SERPL: 26
GFR SERPLBLD CREATININE-BSD FMLA CKD-EPI: >60 ML/MIN/1.73/M2
GLUCOSE SERPL-MCNC: 89 MG/DL (ref 82–115)
GLUCOSE UR QL STRIP: NEGATIVE
HGB UR QL STRIP: NEGATIVE
KETONES UR QL STRIP: NEGATIVE
LEUKOCYTE ESTERASE UR QL STRIP: ABNORMAL
MAGNESIUM SERPL-MCNC: 2.4 MG/DL (ref 1.6–2.6)
NITRITE UR QL STRIP: POSITIVE
PH UR STRIP: 6.5 [PH]
POTASSIUM SERPL-SCNC: 3.1 MMOL/L (ref 3.5–5.1)
PREALB SERPL-MCNC: 10.8 MG/DL (ref 16–42)
PROT UR QL STRIP: NEGATIVE
RBC #/AREA URNS AUTO: ABNORMAL /HPF
SODIUM SERPL-SCNC: 142 MMOL/L (ref 136–145)
SP GR UR STRIP.AUTO: 1.01 (ref 1–1.03)
SQUAMOUS #/AREA URNS AUTO: ABNORMAL /HPF
UROBILINOGEN UR STRIP-ACNC: 1
WBC #/AREA URNS AUTO: ABNORMAL /HPF

## 2025-02-07 PROCEDURE — 81003 URINALYSIS AUTO W/O SCOPE: CPT | Performed by: NURSE PRACTITIONER

## 2025-02-07 PROCEDURE — 84134 ASSAY OF PREALBUMIN: CPT | Performed by: NURSE PRACTITIONER

## 2025-02-07 PROCEDURE — 80048 BASIC METABOLIC PNL TOTAL CA: CPT | Performed by: NURSE PRACTITIONER

## 2025-02-07 PROCEDURE — 83735 ASSAY OF MAGNESIUM: CPT | Performed by: NURSE PRACTITIONER

## 2025-02-08 ENCOUNTER — LAB REQUISITION (OUTPATIENT)
Dept: LAB | Facility: HOSPITAL | Age: 70
End: 2025-02-08
Payer: MEDICARE

## 2025-02-08 DIAGNOSIS — L08.9 LOCAL INFECTION OF THE SKIN AND SUBCUTANEOUS TISSUE, UNSPECIFIED: ICD-10-CM

## 2025-02-08 LAB
ALBUMIN SERPL-MCNC: 2.1 G/DL (ref 3.4–4.8)
ALBUMIN/GLOB SERPL: 0.5 RATIO (ref 1.1–2)
ALP SERPL-CCNC: 81 UNIT/L (ref 40–150)
ALT SERPL-CCNC: 9 UNIT/L (ref 0–55)
ANION GAP SERPL CALC-SCNC: 12 MEQ/L
AST SERPL-CCNC: 14 UNIT/L (ref 5–34)
BASOPHILS # BLD AUTO: 0.06 X10(3)/MCL
BASOPHILS NFR BLD AUTO: 0.3 %
BILIRUB SERPL-MCNC: 1.1 MG/DL
BUN SERPL-MCNC: 27.8 MG/DL (ref 8.4–25.7)
CALCIUM SERPL-MCNC: 7.9 MG/DL (ref 8.8–10)
CHLORIDE SERPL-SCNC: 99 MMOL/L (ref 98–107)
CO2 SERPL-SCNC: 30 MMOL/L (ref 23–31)
CREAT SERPL-MCNC: 0.9 MG/DL (ref 0.72–1.25)
CREAT/UREA NIT SERPL: 31
CRP SERPL HS-MCNC: 201.32 MG/L
EOSINOPHIL # BLD AUTO: 0.03 X10(3)/MCL (ref 0–0.9)
EOSINOPHIL NFR BLD AUTO: 0.2 %
ERYTHROCYTE [DISTWIDTH] IN BLOOD BY AUTOMATED COUNT: 15.5 % (ref 11.5–17)
ERYTHROCYTE [SEDIMENTATION RATE] IN BLOOD: 43 MM/HR (ref 0–15)
GFR SERPLBLD CREATININE-BSD FMLA CKD-EPI: >60 ML/MIN/1.73/M2
GLOBULIN SER-MCNC: 4.6 GM/DL (ref 2.4–3.5)
GLUCOSE SERPL-MCNC: 117 MG/DL (ref 82–115)
HCT VFR BLD AUTO: 22.9 % (ref 42–52)
HGB BLD-MCNC: 7.2 G/DL (ref 14–18)
IMM GRANULOCYTES # BLD AUTO: 0.1 X10(3)/MCL (ref 0–0.04)
IMM GRANULOCYTES NFR BLD AUTO: 0.6 %
LYMPHOCYTES # BLD AUTO: 1.12 X10(3)/MCL (ref 0.6–4.6)
LYMPHOCYTES NFR BLD AUTO: 6.4 %
MCH RBC QN AUTO: 26.2 PG (ref 27–31)
MCHC RBC AUTO-ENTMCNC: 31.4 G/DL (ref 33–36)
MCV RBC AUTO: 83.3 FL (ref 80–94)
MONOCYTES # BLD AUTO: 0.76 X10(3)/MCL (ref 0.1–1.3)
MONOCYTES NFR BLD AUTO: 4.4 %
NEUTROPHILS # BLD AUTO: 15.32 X10(3)/MCL (ref 2.1–9.2)
NEUTROPHILS NFR BLD AUTO: 88.1 %
NRBC BLD AUTO-RTO: 0 %
PLATELET # BLD AUTO: 412 X10(3)/MCL (ref 130–400)
PMV BLD AUTO: 10.3 FL (ref 7.4–10.4)
POTASSIUM SERPL-SCNC: 3 MMOL/L (ref 3.5–5.1)
PREALB SERPL-MCNC: 9.9 MG/DL (ref 16–42)
PROT SERPL-MCNC: 6.7 GM/DL (ref 5.8–7.6)
RBC # BLD AUTO: 2.75 X10(6)/MCL (ref 4.7–6.1)
SODIUM SERPL-SCNC: 141 MMOL/L (ref 136–145)
WBC # BLD AUTO: 17.39 X10(3)/MCL (ref 4.5–11.5)

## 2025-02-08 PROCEDURE — 84134 ASSAY OF PREALBUMIN: CPT | Performed by: NURSE PRACTITIONER

## 2025-02-08 PROCEDURE — 85025 COMPLETE CBC W/AUTO DIFF WBC: CPT | Performed by: NURSE PRACTITIONER

## 2025-02-08 PROCEDURE — 86141 C-REACTIVE PROTEIN HS: CPT | Performed by: NURSE PRACTITIONER

## 2025-02-08 PROCEDURE — 85652 RBC SED RATE AUTOMATED: CPT | Performed by: NURSE PRACTITIONER

## 2025-02-08 PROCEDURE — 80053 COMPREHEN METABOLIC PANEL: CPT | Performed by: NURSE PRACTITIONER

## 2025-02-10 ENCOUNTER — LAB REQUISITION (OUTPATIENT)
Dept: LAB | Facility: HOSPITAL | Age: 70
End: 2025-02-10
Payer: MEDICARE

## 2025-02-10 DIAGNOSIS — L08.9 LOCAL INFECTION OF THE SKIN AND SUBCUTANEOUS TISSUE, UNSPECIFIED: ICD-10-CM

## 2025-02-10 PROCEDURE — 87077 CULTURE AEROBIC IDENTIFY: CPT | Mod: 91 | Performed by: NURSE PRACTITIONER

## 2025-02-13 ENCOUNTER — LAB REQUISITION (OUTPATIENT)
Dept: LAB | Facility: HOSPITAL | Age: 70
End: 2025-02-13
Payer: MEDICARE

## 2025-02-13 DIAGNOSIS — A41.9 SEPSIS, UNSPECIFIED ORGANISM: ICD-10-CM

## 2025-02-13 LAB
BACTERIA WND CULT: ABNORMAL
BACTERIA WND CULT: ABNORMAL
VANCOMYCIN TROUGH SERPL-MCNC: 35.7 UG/ML (ref 15–20)

## 2025-02-13 PROCEDURE — 80202 ASSAY OF VANCOMYCIN: CPT | Performed by: INTERNAL MEDICINE

## 2025-02-21 ENCOUNTER — LAB REQUISITION (OUTPATIENT)
Dept: LAB | Facility: HOSPITAL | Age: 70
End: 2025-02-21
Payer: MEDICARE

## 2025-02-21 DIAGNOSIS — L89.150 PRESSURE ULCER OF SACRAL REGION, UNSTAGEABLE: ICD-10-CM

## 2025-02-21 LAB
ALBUMIN SERPL-MCNC: 2.3 G/DL (ref 3.4–4.8)
ALBUMIN/GLOB SERPL: 0.5 RATIO (ref 1.1–2)
ALP SERPL-CCNC: 75 UNIT/L (ref 40–150)
ALT SERPL-CCNC: 9 UNIT/L (ref 0–55)
ANION GAP SERPL CALC-SCNC: 13 MEQ/L
AST SERPL-CCNC: 21 UNIT/L (ref 5–34)
BASOPHILS # BLD AUTO: 0.03 X10(3)/MCL
BASOPHILS NFR BLD AUTO: 0.4 %
BILIRUB SERPL-MCNC: 0.9 MG/DL
BUN SERPL-MCNC: 24 MG/DL (ref 8.4–25.7)
CALCIUM SERPL-MCNC: 8.3 MG/DL (ref 8.8–10)
CHLORIDE SERPL-SCNC: 105 MMOL/L (ref 98–107)
CO2 SERPL-SCNC: 25 MMOL/L (ref 23–31)
CREAT SERPL-MCNC: 0.74 MG/DL (ref 0.72–1.25)
CREAT/UREA NIT SERPL: 32
CRP SERPL HS-MCNC: 53.19 MG/L
EOSINOPHIL # BLD AUTO: 0.1 X10(3)/MCL (ref 0–0.9)
EOSINOPHIL NFR BLD AUTO: 1.4 %
ERYTHROCYTE [DISTWIDTH] IN BLOOD BY AUTOMATED COUNT: 18.6 % (ref 11.5–17)
ERYTHROCYTE [SEDIMENTATION RATE] IN BLOOD: 70 MM/HR (ref 0–15)
GFR SERPLBLD CREATININE-BSD FMLA CKD-EPI: >60 ML/MIN/1.73/M2
GLOBULIN SER-MCNC: 5 GM/DL (ref 2.4–3.5)
GLUCOSE SERPL-MCNC: 81 MG/DL (ref 82–115)
HCT VFR BLD AUTO: 25.1 % (ref 42–52)
HGB BLD-MCNC: 7.6 G/DL (ref 14–18)
IMM GRANULOCYTES # BLD AUTO: 0.06 X10(3)/MCL (ref 0–0.04)
IMM GRANULOCYTES NFR BLD AUTO: 0.8 %
LYMPHOCYTES # BLD AUTO: 1.43 X10(3)/MCL (ref 0.6–4.6)
LYMPHOCYTES NFR BLD AUTO: 20 %
MCH RBC QN AUTO: 26 PG (ref 27–31)
MCHC RBC AUTO-ENTMCNC: 30.3 G/DL (ref 33–36)
MCV RBC AUTO: 86 FL (ref 80–94)
MONOCYTES # BLD AUTO: 0.49 X10(3)/MCL (ref 0.1–1.3)
MONOCYTES NFR BLD AUTO: 6.9 %
NEUTROPHILS # BLD AUTO: 5.04 X10(3)/MCL (ref 2.1–9.2)
NEUTROPHILS NFR BLD AUTO: 70.5 %
NRBC BLD AUTO-RTO: 0 %
PLATELET # BLD AUTO: 303 X10(3)/MCL (ref 130–400)
PLATELETS.RETICULATED NFR BLD AUTO: 2.3 % (ref 0.9–11.2)
PMV BLD AUTO: 11.2 FL (ref 7.4–10.4)
POTASSIUM SERPL-SCNC: 3.6 MMOL/L (ref 3.5–5.1)
PROT SERPL-MCNC: 7.3 GM/DL (ref 5.8–7.6)
RBC # BLD AUTO: 2.92 X10(6)/MCL (ref 4.7–6.1)
SODIUM SERPL-SCNC: 143 MMOL/L (ref 136–145)
WBC # BLD AUTO: 7.15 X10(3)/MCL (ref 4.5–11.5)

## 2025-02-21 PROCEDURE — 86141 C-REACTIVE PROTEIN HS: CPT | Performed by: INTERNAL MEDICINE

## 2025-02-21 PROCEDURE — 80053 COMPREHEN METABOLIC PANEL: CPT | Performed by: INTERNAL MEDICINE

## 2025-02-21 PROCEDURE — 85652 RBC SED RATE AUTOMATED: CPT | Performed by: INTERNAL MEDICINE

## 2025-02-21 PROCEDURE — 85025 COMPLETE CBC W/AUTO DIFF WBC: CPT | Performed by: INTERNAL MEDICINE

## 2025-02-26 ENCOUNTER — LAB REQUISITION (OUTPATIENT)
Dept: LAB | Facility: HOSPITAL | Age: 70
End: 2025-02-26
Payer: MEDICARE

## 2025-02-26 ENCOUNTER — HOSPITAL ENCOUNTER (INPATIENT)
Facility: HOSPITAL | Age: 70
LOS: 6 days | Discharge: HOME | DRG: 515 | End: 2025-03-05
Attending: STUDENT IN AN ORGANIZED HEALTH CARE EDUCATION/TRAINING PROGRAM | Admitting: INTERNAL MEDICINE
Payer: MEDICARE

## 2025-02-26 ENCOUNTER — HOSPITAL ENCOUNTER (OUTPATIENT)
Dept: RADIOLOGY | Facility: HOSPITAL | Age: 70
Discharge: HOME | End: 2025-02-26
Payer: MEDICARE

## 2025-02-26 DIAGNOSIS — N39.0 COMPLICATED UTI (URINARY TRACT INFECTION): ICD-10-CM

## 2025-02-26 DIAGNOSIS — I46.9 CARDIAC ARREST: ICD-10-CM

## 2025-02-26 DIAGNOSIS — D3A.8 NEUROENDOCRINE TUMOR: ICD-10-CM

## 2025-02-26 DIAGNOSIS — Z93.1 GASTROSTOMY STATUS: Primary | ICD-10-CM

## 2025-02-26 DIAGNOSIS — R00.0 TACHYCARDIA: ICD-10-CM

## 2025-02-26 DIAGNOSIS — I63.9 IMPENDING CEREBROVASCULAR ACCIDENT: ICD-10-CM

## 2025-02-26 DIAGNOSIS — I10 ESSENTIAL (PRIMARY) HYPERTENSION: ICD-10-CM

## 2025-02-26 DIAGNOSIS — R11.10 HABIT VOMITING: ICD-10-CM

## 2025-02-26 DIAGNOSIS — S31.000A WOUND OF SACRAL REGION, INITIAL ENCOUNTER: ICD-10-CM

## 2025-02-26 DIAGNOSIS — Z93.1 GASTROSTOMY STATUS: ICD-10-CM

## 2025-02-26 DIAGNOSIS — I48.91 A-FIB: ICD-10-CM

## 2025-02-26 DIAGNOSIS — J69.0 ASPIRATION PNEUMONIA, UNSPECIFIED ASPIRATION PNEUMONIA TYPE, UNSPECIFIED LATERALITY, UNSPECIFIED PART OF LUNG: Primary | ICD-10-CM

## 2025-02-26 DIAGNOSIS — D64.9 SYMPTOMATIC ANEMIA: ICD-10-CM

## 2025-02-26 LAB
ACANTHOCYTES (OLG): ABNORMAL
ALBUMIN SERPL-MCNC: 2.3 G/DL (ref 3.4–4.8)
ALBUMIN SERPL-MCNC: 2.5 G/DL (ref 3.4–4.8)
ALBUMIN/GLOB SERPL: 0.5 RATIO (ref 1.1–2)
ALBUMIN/GLOB SERPL: 0.5 RATIO (ref 1.1–2)
ALP SERPL-CCNC: 84 UNIT/L (ref 40–150)
ALP SERPL-CCNC: 86 UNIT/L (ref 40–150)
ALT SERPL-CCNC: 11 UNIT/L (ref 0–55)
ALT SERPL-CCNC: 8 UNIT/L (ref 0–55)
ANION GAP SERPL CALC-SCNC: 11 MEQ/L
ANION GAP SERPL CALC-SCNC: 13 MEQ/L
ANISOCYTOSIS BLD QL SMEAR: ABNORMAL
APTT PPP: 35.1 SECONDS (ref 23.2–33.7)
AST SERPL-CCNC: 16 UNIT/L (ref 5–34)
AST SERPL-CCNC: 17 UNIT/L (ref 5–34)
BASOPHILS # BLD AUTO: 0.03 X10(3)/MCL
BASOPHILS # BLD AUTO: 0.05 X10(3)/MCL
BASOPHILS NFR BLD AUTO: 0.5 %
BASOPHILS NFR BLD AUTO: 0.7 %
BILIRUB SERPL-MCNC: 1.2 MG/DL
BILIRUB SERPL-MCNC: 1.2 MG/DL
BNP BLD-MCNC: 246.2 PG/ML
BUN SERPL-MCNC: 20.2 MG/DL (ref 8.4–25.7)
BUN SERPL-MCNC: 23.8 MG/DL (ref 8.4–25.7)
CALCIUM SERPL-MCNC: 8.2 MG/DL (ref 8.8–10)
CALCIUM SERPL-MCNC: 8.8 MG/DL (ref 8.8–10)
CHLORIDE SERPL-SCNC: 106 MMOL/L (ref 98–107)
CHLORIDE SERPL-SCNC: 108 MMOL/L (ref 98–107)
CO2 SERPL-SCNC: 24 MMOL/L (ref 23–31)
CO2 SERPL-SCNC: 25 MMOL/L (ref 23–31)
CREAT SERPL-MCNC: 0.63 MG/DL (ref 0.72–1.25)
CREAT SERPL-MCNC: 0.64 MG/DL (ref 0.72–1.25)
CREAT/UREA NIT SERPL: 32
CREAT/UREA NIT SERPL: 37
EOSINOPHIL # BLD AUTO: 0.04 X10(3)/MCL (ref 0–0.9)
EOSINOPHIL # BLD AUTO: 0.11 X10(3)/MCL (ref 0–0.9)
EOSINOPHIL NFR BLD AUTO: 0.6 %
EOSINOPHIL NFR BLD AUTO: 1.6 %
ERYTHROCYTE [DISTWIDTH] IN BLOOD BY AUTOMATED COUNT: 21.2 % (ref 11.5–17)
ERYTHROCYTE [DISTWIDTH] IN BLOOD BY AUTOMATED COUNT: 21.4 % (ref 11.5–17)
GFR SERPLBLD CREATININE-BSD FMLA CKD-EPI: >60 ML/MIN/1.73/M2
GFR SERPLBLD CREATININE-BSD FMLA CKD-EPI: >60 ML/MIN/1.73/M2
GLOBULIN SER-MCNC: 4.5 GM/DL (ref 2.4–3.5)
GLOBULIN SER-MCNC: 5.3 GM/DL (ref 2.4–3.5)
GLUCOSE SERPL-MCNC: 64 MG/DL (ref 82–115)
GLUCOSE SERPL-MCNC: 90 MG/DL (ref 82–115)
GROUP & RH: NORMAL
HCT VFR BLD AUTO: 22.8 % (ref 42–52)
HCT VFR BLD AUTO: 24.6 % (ref 42–52)
HGB BLD-MCNC: 6.3 G/DL (ref 14–18)
HGB BLD-MCNC: 7.1 G/DL (ref 14–18)
HYPOCHROMIA BLD QL SMEAR: ABNORMAL
IMM GRANULOCYTES # BLD AUTO: 0.02 X10(3)/MCL (ref 0–0.04)
IMM GRANULOCYTES # BLD AUTO: 0.03 X10(3)/MCL (ref 0–0.04)
IMM GRANULOCYTES NFR BLD AUTO: 0.3 %
IMM GRANULOCYTES NFR BLD AUTO: 0.4 %
INDIRECT COOMBS: NORMAL
INR PPP: 1.4
LYMPHOCYTES # BLD AUTO: 1.34 X10(3)/MCL (ref 0.6–4.6)
LYMPHOCYTES # BLD AUTO: 1.37 X10(3)/MCL (ref 0.6–4.6)
LYMPHOCYTES NFR BLD AUTO: 19.1 %
LYMPHOCYTES NFR BLD AUTO: 21.2 %
MACROCYTES BLD QL SMEAR: ABNORMAL
MAGNESIUM SERPL-MCNC: 2.3 MG/DL (ref 1.6–2.6)
MAGNESIUM SERPL-MCNC: 2.3 MG/DL (ref 1.6–2.6)
MCH RBC QN AUTO: 25.4 PG (ref 27–31)
MCH RBC QN AUTO: 26.6 PG (ref 27–31)
MCHC RBC AUTO-ENTMCNC: 27.6 G/DL (ref 33–36)
MCHC RBC AUTO-ENTMCNC: 28.9 G/DL (ref 33–36)
MCV RBC AUTO: 91.9 FL (ref 80–94)
MCV RBC AUTO: 92.1 FL (ref 80–94)
MICROCYTES BLD QL SMEAR: ABNORMAL
MONOCYTES # BLD AUTO: 0.45 X10(3)/MCL (ref 0.1–1.3)
MONOCYTES # BLD AUTO: 0.78 X10(3)/MCL (ref 0.1–1.3)
MONOCYTES NFR BLD AUTO: 11.1 %
MONOCYTES NFR BLD AUTO: 7 %
NEUTROPHILS # BLD AUTO: 4.56 X10(3)/MCL (ref 2.1–9.2)
NEUTROPHILS # BLD AUTO: 4.71 X10(3)/MCL (ref 2.1–9.2)
NEUTROPHILS NFR BLD AUTO: 67.1 %
NEUTROPHILS NFR BLD AUTO: 70.4 %
NRBC BLD AUTO-RTO: 0 %
NRBC BLD AUTO-RTO: 0.3 %
PLATELET # BLD AUTO: 318 X10(3)/MCL (ref 130–400)
PLATELET # BLD AUTO: 329 X10(3)/MCL (ref 130–400)
PLATELET # BLD EST: ADEQUATE 10*3/UL
PMV BLD AUTO: 10.4 FL (ref 7.4–10.4)
PMV BLD AUTO: 11.1 FL (ref 7.4–10.4)
POIKILOCYTOSIS BLD QL SMEAR: ABNORMAL
POTASSIUM SERPL-SCNC: 3.2 MMOL/L (ref 3.5–5.1)
POTASSIUM SERPL-SCNC: 3.3 MMOL/L (ref 3.5–5.1)
PROT SERPL-MCNC: 6.8 GM/DL (ref 5.8–7.6)
PROT SERPL-MCNC: 7.8 GM/DL (ref 5.8–7.6)
PROTHROMBIN TIME: 17.1 SECONDS (ref 12.5–14.5)
RBC # BLD AUTO: 2.48 X10(6)/MCL (ref 4.7–6.1)
RBC # BLD AUTO: 2.67 X10(6)/MCL (ref 4.7–6.1)
RBC MORPH BLD: ABNORMAL
SODIUM SERPL-SCNC: 143 MMOL/L (ref 136–145)
SODIUM SERPL-SCNC: 144 MMOL/L (ref 136–145)
SPECIMEN OUTDATE: NORMAL
TROPONIN I SERPL-MCNC: <0.01 NG/ML (ref 0–0.04)
WBC # BLD AUTO: 6.47 X10(3)/MCL (ref 4.5–11.5)
WBC # BLD AUTO: 7.02 X10(3)/MCL (ref 4.5–11.5)

## 2025-02-26 PROCEDURE — 96374 THER/PROPH/DIAG INJ IV PUSH: CPT

## 2025-02-26 PROCEDURE — 99285 EMERGENCY DEPT VISIT HI MDM: CPT | Mod: 25

## 2025-02-26 PROCEDURE — 80053 COMPREHEN METABOLIC PANEL: CPT | Performed by: STUDENT IN AN ORGANIZED HEALTH CARE EDUCATION/TRAINING PROGRAM

## 2025-02-26 PROCEDURE — 63600175 PHARM REV CODE 636 W HCPCS: Performed by: STUDENT IN AN ORGANIZED HEALTH CARE EDUCATION/TRAINING PROGRAM

## 2025-02-26 PROCEDURE — 74018 RADEX ABDOMEN 1 VIEW: CPT | Mod: TC

## 2025-02-26 PROCEDURE — 83735 ASSAY OF MAGNESIUM: CPT | Performed by: NURSE PRACTITIONER

## 2025-02-26 PROCEDURE — 85730 THROMBOPLASTIN TIME PARTIAL: CPT | Performed by: STUDENT IN AN ORGANIZED HEALTH CARE EDUCATION/TRAINING PROGRAM

## 2025-02-26 PROCEDURE — 82728 ASSAY OF FERRITIN: CPT | Performed by: STUDENT IN AN ORGANIZED HEALTH CARE EDUCATION/TRAINING PROGRAM

## 2025-02-26 PROCEDURE — 5A1955Z RESPIRATORY VENTILATION, GREATER THAN 96 CONSECUTIVE HOURS: ICD-10-PCS | Performed by: INTERNAL MEDICINE

## 2025-02-26 PROCEDURE — 86850 RBC ANTIBODY SCREEN: CPT | Performed by: STUDENT IN AN ORGANIZED HEALTH CARE EDUCATION/TRAINING PROGRAM

## 2025-02-26 PROCEDURE — 85025 COMPLETE CBC W/AUTO DIFF WBC: CPT | Performed by: STUDENT IN AN ORGANIZED HEALTH CARE EDUCATION/TRAINING PROGRAM

## 2025-02-26 PROCEDURE — 80053 COMPREHEN METABOLIC PANEL: CPT | Performed by: NURSE PRACTITIONER

## 2025-02-26 PROCEDURE — 83880 ASSAY OF NATRIURETIC PEPTIDE: CPT | Performed by: STUDENT IN AN ORGANIZED HEALTH CARE EDUCATION/TRAINING PROGRAM

## 2025-02-26 PROCEDURE — 27100171 HC OXYGEN HIGH FLOW UP TO 24 HOURS

## 2025-02-26 PROCEDURE — 85025 COMPLETE CBC W/AUTO DIFF WBC: CPT | Performed by: NURSE PRACTITIONER

## 2025-02-26 PROCEDURE — 99900035 HC TECH TIME PER 15 MIN (STAT)

## 2025-02-26 PROCEDURE — 83550 IRON BINDING TEST: CPT | Performed by: STUDENT IN AN ORGANIZED HEALTH CARE EDUCATION/TRAINING PROGRAM

## 2025-02-26 PROCEDURE — 83735 ASSAY OF MAGNESIUM: CPT | Performed by: STUDENT IN AN ORGANIZED HEALTH CARE EDUCATION/TRAINING PROGRAM

## 2025-02-26 PROCEDURE — 96361 HYDRATE IV INFUSION ADD-ON: CPT

## 2025-02-26 PROCEDURE — 84484 ASSAY OF TROPONIN QUANT: CPT | Performed by: STUDENT IN AN ORGANIZED HEALTH CARE EDUCATION/TRAINING PROGRAM

## 2025-02-26 PROCEDURE — 94002 VENT MGMT INPAT INIT DAY: CPT

## 2025-02-26 PROCEDURE — 85610 PROTHROMBIN TIME: CPT | Performed by: STUDENT IN AN ORGANIZED HEALTH CARE EDUCATION/TRAINING PROGRAM

## 2025-02-26 RX ORDER — PANTOPRAZOLE SODIUM 40 MG/10ML
80 INJECTION, POWDER, LYOPHILIZED, FOR SOLUTION INTRAVENOUS
Status: COMPLETED | OUTPATIENT
Start: 2025-02-26 | End: 2025-02-26

## 2025-02-26 RX ADMIN — SODIUM CHLORIDE, POTASSIUM CHLORIDE, SODIUM LACTATE AND CALCIUM CHLORIDE 1000 ML: 600; 310; 30; 20 INJECTION, SOLUTION INTRAVENOUS at 10:02

## 2025-02-26 RX ADMIN — PANTOPRAZOLE SODIUM 80 MG: 40 INJECTION, POWDER, LYOPHILIZED, FOR SOLUTION INTRAVENOUS at 10:02

## 2025-02-27 PROBLEM — D64.9 ANEMIA REQUIRING TRANSFUSIONS: Status: ACTIVE | Noted: 2025-02-27

## 2025-02-27 PROBLEM — E43 SEVERE MALNUTRITION: Status: ACTIVE | Noted: 2025-02-27

## 2025-02-27 LAB
ABO + RH BLD: NORMAL
ABO + RH BLD: NORMAL
ANION GAP SERPL CALC-SCNC: 7 MEQ/L
BACTERIA #/AREA URNS AUTO: ABNORMAL /HPF
BACTERIA #/AREA URNS AUTO: ABNORMAL /HPF
BASOPHILS # BLD AUTO: 0.04 X10(3)/MCL
BASOPHILS NFR BLD AUTO: 0.5 %
BILIRUB UR QL STRIP.AUTO: NEGATIVE
BILIRUB UR QL STRIP.AUTO: NEGATIVE
BLD PROD TYP BPU: NORMAL
BLD PROD TYP BPU: NORMAL
BLOOD UNIT EXPIRATION DATE: NORMAL
BLOOD UNIT EXPIRATION DATE: NORMAL
BLOOD UNIT TYPE CODE: 6200
BLOOD UNIT TYPE CODE: 6200
BUN SERPL-MCNC: 16.6 MG/DL (ref 8.4–25.7)
CALCIUM SERPL-MCNC: 9.5 MG/DL (ref 8.8–10)
CAOX CRY UR QL COMP ASSIST: ABNORMAL
CAOX CRY UR QL COMP ASSIST: ABNORMAL
CHLORIDE SERPL-SCNC: 109 MMOL/L (ref 98–107)
CLARITY UR: ABNORMAL
CLARITY UR: CLEAR
CO2 SERPL-SCNC: 24 MMOL/L (ref 23–31)
COLOR UR AUTO: YELLOW
COLOR UR AUTO: YELLOW
CREAT SERPL-MCNC: 0.66 MG/DL (ref 0.72–1.25)
CREAT/UREA NIT SERPL: 25
CROSSMATCH INTERPRETATION: NORMAL
CROSSMATCH INTERPRETATION: NORMAL
DISPENSE STATUS: NORMAL
DISPENSE STATUS: NORMAL
EOSINOPHIL # BLD AUTO: 0.04 X10(3)/MCL (ref 0–0.9)
EOSINOPHIL NFR BLD AUTO: 0.5 %
ERYTHROCYTE [DISTWIDTH] IN BLOOD BY AUTOMATED COUNT: 18.5 % (ref 11.5–17)
FERRITIN SERPL-MCNC: 651.13 NG/ML (ref 21.81–274.66)
GFR SERPLBLD CREATININE-BSD FMLA CKD-EPI: >60 ML/MIN/1.73/M2
GLUCOSE SERPL-MCNC: 105 MG/DL (ref 82–115)
GLUCOSE UR QL STRIP: NORMAL
GLUCOSE UR QL STRIP: NORMAL
GRAM STN SPEC: NORMAL
HCT VFR BLD AUTO: 28.1 % (ref 42–52)
HGB BLD-MCNC: 8.7 G/DL (ref 14–18)
HGB UR QL STRIP: NEGATIVE
HGB UR QL STRIP: NEGATIVE
IMM GRANULOCYTES # BLD AUTO: 0.04 X10(3)/MCL (ref 0–0.04)
IMM GRANULOCYTES NFR BLD AUTO: 0.5 %
IRON SATN MFR SERPL: 16 % (ref 20–50)
IRON SERPL-MCNC: 34 UG/DL (ref 65–175)
KETONES UR QL STRIP: NEGATIVE
KETONES UR QL STRIP: NEGATIVE
LACTATE SERPL-SCNC: 1.3 MMOL/L (ref 0.5–2.2)
LACTATE SERPL-SCNC: 2.6 MMOL/L (ref 0.5–2.2)
LEUKOCYTE ESTERASE UR QL STRIP: 500
LEUKOCYTE ESTERASE UR QL STRIP: 75
LYMPHOCYTES # BLD AUTO: 1.91 X10(3)/MCL (ref 0.6–4.6)
LYMPHOCYTES NFR BLD AUTO: 26.2 %
MCH RBC QN AUTO: 27.3 PG (ref 27–31)
MCHC RBC AUTO-ENTMCNC: 31 G/DL (ref 33–36)
MCV RBC AUTO: 88.1 FL (ref 80–94)
MONOCYTES # BLD AUTO: 0.35 X10(3)/MCL (ref 0.1–1.3)
MONOCYTES NFR BLD AUTO: 4.8 %
MRSA PCR SCRN (OHS): NOT DETECTED
MUCOUS THREADS URNS QL MICRO: ABNORMAL /LPF
NEUTROPHILS # BLD AUTO: 4.91 X10(3)/MCL (ref 2.1–9.2)
NEUTROPHILS NFR BLD AUTO: 67.5 %
NITRITE UR QL STRIP: NEGATIVE
NITRITE UR QL STRIP: NEGATIVE
NRBC BLD AUTO-RTO: 0.3 %
OHS QRS DURATION: 82 MS
OHS QTC CALCULATION: 504 MS
PH UR STRIP: 6 [PH]
PH UR STRIP: 7 [PH]
PLATELET # BLD AUTO: 333 X10(3)/MCL (ref 130–400)
PMV BLD AUTO: 10 FL (ref 7.4–10.4)
POTASSIUM SERPL-SCNC: 3.7 MMOL/L (ref 3.5–5.1)
PROT UR QL STRIP: ABNORMAL
PROT UR QL STRIP: ABNORMAL
RBC # BLD AUTO: 3.19 X10(6)/MCL (ref 4.7–6.1)
RBC #/AREA URNS AUTO: ABNORMAL /HPF
RBC #/AREA URNS AUTO: ABNORMAL /HPF
SODIUM SERPL-SCNC: 140 MMOL/L (ref 136–145)
SP GR UR STRIP.AUTO: 1.03 (ref 1–1.03)
SP GR UR STRIP.AUTO: >1.05 (ref 1–1.03)
SQUAMOUS #/AREA URNS LPF: ABNORMAL /HPF
SQUAMOUS #/AREA URNS LPF: ABNORMAL /HPF
TIBC SERPL-MCNC: 185 UG/DL (ref 60–240)
TIBC SERPL-MCNC: 219 UG/DL (ref 250–450)
TRANSFERRIN SERPL-MCNC: 206 MG/DL (ref 163–344)
UNIT NUMBER: NORMAL
UNIT NUMBER: NORMAL
UROBILINOGEN UR STRIP-ACNC: 4
UROBILINOGEN UR STRIP-ACNC: 8
WBC # BLD AUTO: 7.29 X10(3)/MCL (ref 4.5–11.5)
WBC #/AREA URNS AUTO: >100 /HPF
WBC #/AREA URNS AUTO: ABNORMAL /HPF
YEAST BUDDING URNS QL: ABNORMAL /HPF

## 2025-02-27 PROCEDURE — 25000003 PHARM REV CODE 250: Performed by: STUDENT IN AN ORGANIZED HEALTH CARE EDUCATION/TRAINING PROGRAM

## 2025-02-27 PROCEDURE — 87205 SMEAR GRAM STAIN: CPT | Performed by: STUDENT IN AN ORGANIZED HEALTH CARE EDUCATION/TRAINING PROGRAM

## 2025-02-27 PROCEDURE — 99900026 HC AIRWAY MAINTENANCE (STAT)

## 2025-02-27 PROCEDURE — 87070 CULTURE OTHR SPECIMN AEROBIC: CPT | Performed by: STUDENT IN AN ORGANIZED HEALTH CARE EDUCATION/TRAINING PROGRAM

## 2025-02-27 PROCEDURE — 27000207 HC ISOLATION

## 2025-02-27 PROCEDURE — 87086 URINE CULTURE/COLONY COUNT: CPT | Performed by: STUDENT IN AN ORGANIZED HEALTH CARE EDUCATION/TRAINING PROGRAM

## 2025-02-27 PROCEDURE — 36430 TRANSFUSION BLD/BLD COMPNT: CPT

## 2025-02-27 PROCEDURE — 94761 N-INVAS EAR/PLS OXIMETRY MLT: CPT

## 2025-02-27 PROCEDURE — 81015 MICROSCOPIC EXAM OF URINE: CPT | Performed by: STUDENT IN AN ORGANIZED HEALTH CARE EDUCATION/TRAINING PROGRAM

## 2025-02-27 PROCEDURE — 25500020 PHARM REV CODE 255: Performed by: STUDENT IN AN ORGANIZED HEALTH CARE EDUCATION/TRAINING PROGRAM

## 2025-02-27 PROCEDURE — 99900031 HC PATIENT EDUCATION (STAT)

## 2025-02-27 PROCEDURE — 93005 ELECTROCARDIOGRAM TRACING: CPT

## 2025-02-27 PROCEDURE — 99900035 HC TECH TIME PER 15 MIN (STAT)

## 2025-02-27 PROCEDURE — 96361 HYDRATE IV INFUSION ADD-ON: CPT

## 2025-02-27 PROCEDURE — 80177 DRUG SCRN QUAN LEVETIRACETAM: CPT | Performed by: STUDENT IN AN ORGANIZED HEALTH CARE EDUCATION/TRAINING PROGRAM

## 2025-02-27 PROCEDURE — 93010 ELECTROCARDIOGRAM REPORT: CPT | Mod: ,,, | Performed by: INTERNAL MEDICINE

## 2025-02-27 PROCEDURE — 63600175 PHARM REV CODE 636 W HCPCS: Performed by: STUDENT IN AN ORGANIZED HEALTH CARE EDUCATION/TRAINING PROGRAM

## 2025-02-27 PROCEDURE — 87040 BLOOD CULTURE FOR BACTERIA: CPT | Performed by: STUDENT IN AN ORGANIZED HEALTH CARE EDUCATION/TRAINING PROGRAM

## 2025-02-27 PROCEDURE — 96375 TX/PRO/DX INJ NEW DRUG ADDON: CPT

## 2025-02-27 PROCEDURE — 30233N1 TRANSFUSION OF NONAUTOLOGOUS RED BLOOD CELLS INTO PERIPHERAL VEIN, PERCUTANEOUS APPROACH: ICD-10-PCS | Performed by: INTERNAL MEDICINE

## 2025-02-27 PROCEDURE — 36415 COLL VENOUS BLD VENIPUNCTURE: CPT | Performed by: STUDENT IN AN ORGANIZED HEALTH CARE EDUCATION/TRAINING PROGRAM

## 2025-02-27 PROCEDURE — 83605 ASSAY OF LACTIC ACID: CPT | Performed by: STUDENT IN AN ORGANIZED HEALTH CARE EDUCATION/TRAINING PROGRAM

## 2025-02-27 PROCEDURE — 81001 URINALYSIS AUTO W/SCOPE: CPT | Performed by: STUDENT IN AN ORGANIZED HEALTH CARE EDUCATION/TRAINING PROGRAM

## 2025-02-27 PROCEDURE — P9016 RBC LEUKOCYTES REDUCED: HCPCS | Performed by: STUDENT IN AN ORGANIZED HEALTH CARE EDUCATION/TRAINING PROGRAM

## 2025-02-27 PROCEDURE — 85025 COMPLETE CBC W/AUTO DIFF WBC: CPT | Performed by: STUDENT IN AN ORGANIZED HEALTH CARE EDUCATION/TRAINING PROGRAM

## 2025-02-27 PROCEDURE — 86923 COMPATIBILITY TEST ELECTRIC: CPT | Performed by: STUDENT IN AN ORGANIZED HEALTH CARE EDUCATION/TRAINING PROGRAM

## 2025-02-27 PROCEDURE — 27100171 HC OXYGEN HIGH FLOW UP TO 24 HOURS

## 2025-02-27 PROCEDURE — 80048 BASIC METABOLIC PNL TOTAL CA: CPT | Performed by: STUDENT IN AN ORGANIZED HEALTH CARE EDUCATION/TRAINING PROGRAM

## 2025-02-27 PROCEDURE — 84134 ASSAY OF PREALBUMIN: CPT

## 2025-02-27 PROCEDURE — 87641 MR-STAPH DNA AMP PROBE: CPT | Performed by: STUDENT IN AN ORGANIZED HEALTH CARE EDUCATION/TRAINING PROGRAM

## 2025-02-27 PROCEDURE — 25000003 PHARM REV CODE 250: Performed by: INTERNAL MEDICINE

## 2025-02-27 PROCEDURE — 20000000 HC ICU ROOM

## 2025-02-27 PROCEDURE — 94760 N-INVAS EAR/PLS OXIMETRY 1: CPT

## 2025-02-27 PROCEDURE — 94003 VENT MGMT INPAT SUBQ DAY: CPT

## 2025-02-27 RX ORDER — DILTIAZEM HYDROCHLORIDE 5 MG/ML
10 INJECTION INTRAVENOUS
Status: COMPLETED | OUTPATIENT
Start: 2025-02-27 | End: 2025-02-27

## 2025-02-27 RX ORDER — VANCOMYCIN HCL IN 5 % DEXTROSE 1.25 G/25
20 PLASTIC BAG, INJECTION (ML) INTRAVENOUS
Status: DISCONTINUED | OUTPATIENT
Start: 2025-02-27 | End: 2025-02-27

## 2025-02-27 RX ORDER — VENLAFAXINE 37.5 MG/1
75 TABLET ORAL 2 TIMES DAILY
Status: DISCONTINUED | OUTPATIENT
Start: 2025-02-27 | End: 2025-03-05 | Stop reason: HOSPADM

## 2025-02-27 RX ORDER — METOPROLOL TARTRATE 1 MG/ML
5 INJECTION, SOLUTION INTRAVENOUS ONCE
Status: DISCONTINUED | OUTPATIENT
Start: 2025-02-27 | End: 2025-03-05 | Stop reason: HOSPADM

## 2025-02-27 RX ORDER — CHOLESTYRAMINE 4 G/4.8G
1 POWDER, FOR SUSPENSION ORAL 2 TIMES DAILY
Status: DISCONTINUED | OUTPATIENT
Start: 2025-02-27 | End: 2025-03-05 | Stop reason: HOSPADM

## 2025-02-27 RX ORDER — HYDROCODONE BITARTRATE AND ACETAMINOPHEN 500; 5 MG/1; MG/1
TABLET ORAL
Status: DISCONTINUED | OUTPATIENT
Start: 2025-02-27 | End: 2025-03-01

## 2025-02-27 RX ORDER — SODIUM CHLORIDE 0.9 % (FLUSH) 0.9 %
10 SYRINGE (ML) INJECTION
Status: DISCONTINUED | OUTPATIENT
Start: 2025-02-27 | End: 2025-03-05 | Stop reason: HOSPADM

## 2025-02-27 RX ORDER — AMIODARONE HYDROCHLORIDE 200 MG/1
200 TABLET ORAL DAILY
Status: DISCONTINUED | OUTPATIENT
Start: 2025-02-27 | End: 2025-03-05 | Stop reason: HOSPADM

## 2025-02-27 RX ORDER — LABETALOL HYDROCHLORIDE 5 MG/ML
10 INJECTION, SOLUTION INTRAVENOUS
Status: DISCONTINUED | OUTPATIENT
Start: 2025-02-27 | End: 2025-03-05 | Stop reason: HOSPADM

## 2025-02-27 RX ORDER — FAMOTIDINE 20 MG/1
20 TABLET, FILM COATED ORAL 2 TIMES DAILY
Status: DISCONTINUED | OUTPATIENT
Start: 2025-02-27 | End: 2025-02-27

## 2025-02-27 RX ORDER — MUPIROCIN 20 MG/G
OINTMENT TOPICAL 2 TIMES DAILY
Status: DISCONTINUED | OUTPATIENT
Start: 2025-03-01 | End: 2025-02-27

## 2025-02-27 RX ORDER — SODIUM CHLORIDE 9 MG/ML
INJECTION, SOLUTION INTRAVENOUS CONTINUOUS
Status: DISCONTINUED | OUTPATIENT
Start: 2025-02-27 | End: 2025-03-03

## 2025-02-27 RX ORDER — METOPROLOL TARTRATE 25 MG/1
25 TABLET, FILM COATED ORAL 2 TIMES DAILY
Status: DISCONTINUED | OUTPATIENT
Start: 2025-02-27 | End: 2025-03-05 | Stop reason: HOSPADM

## 2025-02-27 RX ORDER — QUETIAPINE FUMARATE 25 MG/1
25 TABLET, FILM COATED ORAL 2 TIMES DAILY
Status: DISCONTINUED | OUTPATIENT
Start: 2025-02-27 | End: 2025-03-05 | Stop reason: HOSPADM

## 2025-02-27 RX ORDER — BUSPIRONE HYDROCHLORIDE 5 MG/1
5 TABLET ORAL 3 TIMES DAILY
Status: DISCONTINUED | OUTPATIENT
Start: 2025-02-27 | End: 2025-03-05 | Stop reason: HOSPADM

## 2025-02-27 RX ORDER — IPRATROPIUM BROMIDE AND ALBUTEROL SULFATE 2.5; .5 MG/3ML; MG/3ML
3 SOLUTION RESPIRATORY (INHALATION) EVERY 6 HOURS PRN
Status: DISCONTINUED | OUTPATIENT
Start: 2025-02-27 | End: 2025-03-05 | Stop reason: HOSPADM

## 2025-02-27 RX ORDER — PANTOPRAZOLE SODIUM 40 MG/10ML
40 INJECTION, POWDER, LYOPHILIZED, FOR SOLUTION INTRAVENOUS 2 TIMES DAILY
Status: DISCONTINUED | OUTPATIENT
Start: 2025-02-27 | End: 2025-03-05 | Stop reason: HOSPADM

## 2025-02-27 RX ORDER — ATORVASTATIN CALCIUM 10 MG/1
10 TABLET, FILM COATED ORAL DAILY
Status: DISCONTINUED | OUTPATIENT
Start: 2025-02-27 | End: 2025-03-05 | Stop reason: HOSPADM

## 2025-02-27 RX ORDER — SUCRALFATE 1 G/1
1 TABLET ORAL
Status: DISCONTINUED | OUTPATIENT
Start: 2025-02-27 | End: 2025-03-05 | Stop reason: HOSPADM

## 2025-02-27 RX ORDER — MUPIROCIN 20 MG/G
OINTMENT TOPICAL 2 TIMES DAILY
Status: COMPLETED | OUTPATIENT
Start: 2025-02-27 | End: 2025-03-03

## 2025-02-27 RX ORDER — FINASTERIDE 5 MG/1
5 TABLET, FILM COATED ORAL DAILY
Status: DISCONTINUED | OUTPATIENT
Start: 2025-02-27 | End: 2025-03-05 | Stop reason: HOSPADM

## 2025-02-27 RX ADMIN — VANCOMYCIN HYDROCHLORIDE 1500 MG: 1.5 INJECTION, POWDER, LYOPHILIZED, FOR SOLUTION INTRAVENOUS at 05:02

## 2025-02-27 RX ADMIN — MUPIROCIN: 20 OINTMENT TOPICAL at 07:02

## 2025-02-27 RX ADMIN — SODIUM CHLORIDE: 9 INJECTION, SOLUTION INTRAVENOUS at 11:02

## 2025-02-27 RX ADMIN — PIPERACILLIN SODIUM AND TAZOBACTAM SODIUM 4.5 G: 4; .5 INJECTION, POWDER, LYOPHILIZED, FOR SOLUTION INTRAVENOUS at 05:02

## 2025-02-27 RX ADMIN — CHOLESTYRAMINE 4 GRAMS OF ANHYDROUS CHOLESTYRAMINE: 4 POWDER, FOR SUSPENSION ORAL at 09:02

## 2025-02-27 RX ADMIN — SUCRALFATE 1 G: 1 TABLET ORAL at 10:02

## 2025-02-27 RX ADMIN — VENLAFAXINE 75 MG: 37.5 TABLET ORAL at 07:02

## 2025-02-27 RX ADMIN — BUSPIRONE HYDROCHLORIDE 5 MG: 5 TABLET ORAL at 07:02

## 2025-02-27 RX ADMIN — AMIODARONE HYDROCHLORIDE 200 MG: 200 TABLET ORAL at 07:02

## 2025-02-27 RX ADMIN — PANTOPRAZOLE SODIUM 40 MG: 40 INJECTION, POWDER, LYOPHILIZED, FOR SOLUTION INTRAVENOUS at 07:02

## 2025-02-27 RX ADMIN — IOHEXOL 100 ML: 350 INJECTION, SOLUTION INTRAVENOUS at 01:02

## 2025-02-27 RX ADMIN — SUCRALFATE 1 G: 1 TABLET ORAL at 09:02

## 2025-02-27 RX ADMIN — DILTIAZEM HYDROCHLORIDE 10 MG: 5 INJECTION, SOLUTION INTRAVENOUS at 01:02

## 2025-02-27 RX ADMIN — BUSPIRONE HYDROCHLORIDE 5 MG: 5 TABLET ORAL at 09:02

## 2025-02-27 RX ADMIN — METOPROLOL TARTRATE 25 MG: 25 TABLET, FILM COATED ORAL at 07:02

## 2025-02-27 RX ADMIN — POTASSIUM BICARBONATE 40 MEQ: 391 TABLET, EFFERVESCENT ORAL at 05:02

## 2025-02-27 RX ADMIN — METOPROLOL TARTRATE 25 MG: 25 TABLET, FILM COATED ORAL at 09:02

## 2025-02-27 RX ADMIN — QUETIAPINE FUMARATE 25 MG: 25 TABLET ORAL at 09:02

## 2025-02-27 RX ADMIN — MUPIROCIN: 20 OINTMENT TOPICAL at 09:02

## 2025-02-27 RX ADMIN — VENLAFAXINE 75 MG: 37.5 TABLET ORAL at 09:02

## 2025-02-27 RX ADMIN — PANTOPRAZOLE SODIUM 40 MG: 40 INJECTION, POWDER, LYOPHILIZED, FOR SOLUTION INTRAVENOUS at 09:02

## 2025-02-27 RX ADMIN — FINASTERIDE 5 MG: 5 TABLET, FILM COATED ORAL at 07:02

## 2025-02-27 RX ADMIN — CHOLESTYRAMINE 4 GRAMS OF ANHYDROUS CHOLESTYRAMINE: 4 POWDER, FOR SUSPENSION ORAL at 07:02

## 2025-02-27 RX ADMIN — QUETIAPINE FUMARATE 25 MG: 25 TABLET ORAL at 07:02

## 2025-02-27 RX ADMIN — ATORVASTATIN CALCIUM 10 MG: 10 TABLET, FILM COATED ORAL at 07:02

## 2025-02-27 RX ADMIN — SODIUM CHLORIDE 500 ML: 9 INJECTION, SOLUTION INTRAVENOUS at 11:02

## 2025-02-27 NOTE — CONSULTS
Inpatient Nutrition Assessment    Admit Date: 2/26/2025   Total duration of encounter: 1 day   Patient Age: 69 y.o.    Nutrition Recommendation/Prescription     Tube feeding as tolerated, advance by 10 ml/hr every 4 hours (or per physician):  Impact Peptide 1.5 goal rate 50 ml/hr   [id number] once daily  Patel twice daily  to provide  1760 kcal/d, 104% needs  119 g protein/d, 100% needs  770 ml free water/d, 45% needs, recommend 45 ml/hr water flush when appropriate to meet 98% estimated fluid requirements  (calculations based on estimated 20 hr/d run time)     Communication of Recommendations: reviewed with nurse    Nutrition Assessment     Malnutrition Assessment/Nutrition-Focused Physical Exam    Malnutrition Context: chronic illness (02/27/25 1630)  Malnutrition Level: severe (02/27/25 1630)  Energy Intake (Malnutrition): other (see comments) (Unable to assess) (02/27/25 1630)  Weight Loss (Malnutrition): greater than 20% in 1 year (02/27/25 1630)  Subcutaneous Fat (Malnutrition): severe depletion (02/27/25 1630)     Upper Arm Region (Subcutaneous Fat Loss): severe depletion     Muscle Mass (Malnutrition): severe depletion (02/27/25 1630)     Clavicle Bone Region (Muscle Loss): severe depletion                             A minimum of two characteristics is recommended for diagnosis of either severe or non-severe malnutrition.    Chart Review    Reason Seen: continuous nutrition monitoring and physician consult for PEG tube feeding    Malnutrition Screening Tool Results   Have you recently lost weight without trying?: No  Have you been eating poorly because of a decreased appetite?: No   MST Score: 0   Diagnosis:  Normocytic anemia, possible GI bleed  Atypical pneumonia vs recurrent pneumonia  Cystitis   Chronic respiratory failure  Seizure    Relevant Medical History: HTN, CAD, STEMI 2003, hemorrhagic CVA s/p trach PEG 2023, Afib, PM/AICD for history of AV block and Vfib arrest, and recurrent UTI      Scheduled Medications:  amiodarone, 200 mg, Daily  atorvastatin, 10 mg, Daily  busPIRone, 5 mg, TID  cholestyramine-aspartame, 1 packet, BID  finasteride, 5 mg, Daily  metoprolol, 5 mg, Once  metoprolol tartrate, 25 mg, BID  mupirocin, , BID  pantoprazole, 40 mg, BID  QUEtiapine, 25 mg, BID  sucralfate, 1 g, QID (AC & HS)  venlafaxine, 75 mg, BID    Continuous Infusions:  0.9% NaCl, Last Rate: 100 mL/hr at 02/27/25 1117    PRN Medications:   0.9%  NaCl infusion (for blood administration), , Q24H PRN  albuterol-ipratropium, 3 mL, Q6H PRN  labetalol, 10 mg, Q2H PRN  sodium chloride 0.9%, 10 mL, PRN    Calorie Containing IV Medications: no significant kcals from medications at this time    Recent Labs   Lab 02/21/25  0516 02/26/25  0435 02/26/25  2107 02/27/25  0855    144 143 140   K 3.6 3.3* 3.2* 3.7   CALCIUM 8.3* 8.2* 8.8 9.5   MG  --  2.30 2.30  --     106 108* 109*   CO2 25 25 24 24   BUN 24.0 23.8 20.2 16.6   CREATININE 0.74 0.64* 0.63* 0.66*   EGFRNORACEVR >60 >60 >60 >60   GLUCOSE 81* 64* 90 105   BILITOT 0.9 1.2 1.2  --    ALKPHOS 75 84 86  --    ALT 9 11 8  --    AST 21 17 16  --    ALBUMIN 2.3* 2.3* 2.5*  --    HSCRP 53.19*  --   --   --    WBC 7.15 7.02 6.47 7.29   HGB 7.6* 6.3* 7.1* 8.7*   HCT 25.1* 22.8* 24.6* 28.1*     Nutrition Orders:  Diet NPO      Appetite/Oral Intake: NPO/not applicable  Factors Affecting Nutritional Intake: NPO, on mechanical ventilation, and tracheostomy  Social Needs Impacting Access to Food: none identified  Food/Mandaen/Cultural Preferences: unable to obtain  Food Allergies: none reported  Last Bowel Movement: 02/27/25  Wound(s):     Wound 01/05/25 2100 Pressure Injury medial Coccyx #1-Tissue loss description: Full thickness       Wound 08/19/24 1500 Pressure Injury Left anterior Leg-Tissue loss description: Partial thickness       Wound 08/19/24 1500 Pressure Injury Left lateral Ankle-Tissue loss description: Partial thickness       Wound 01/06/25 1000  "Pressure Injury Left lateral;distal Foot-Tissue loss description: Partial thickness    Comments    2/27/25 Patient is chronically ventilator dependent per tracheostomy at nursing home, multiple wounds including stage 4. Documentation of gastrostomy/jejunostomy tube, nurse reports feeding tube resides in the duodenum; reports recent vomiting. Tube feeding started today with Peptamen AF at 20 ml/hr, consult received for recommendations. Recommend Impact Peptide 1.5 (lower volume, wound healing) goal rate     Anthropometrics    Height: 5' 7" (170.2 cm), Height Method: Estimated  Last Weight: 68.7 kg (151 lb 7.3 oz) (02/27/25 1622), Weight Method: Bed Scale  BMI (Calculated): 23.7  BMI Classification: normal (BMI 18.5-24.9)        Ideal Body Weight (IBW), Male: 148 lb     % Ideal Body Weight, Male (lb): 101.35 %                          Usual Weight Provided By: unable to obtain usual weight, weight history in chart reveals over 20% weight loss in 6 months    Wt Readings from Last 10 Encounters:   02/27/25 68.7 kg (151 lb 7.3 oz)   01/06/25 65 kg (143 lb 4.8 oz)   10/21/24 69.1 kg (152 lb 5.4 oz)   08/19/24 90.7 kg (199 lb 15.3 oz)   08/03/24 117.9 kg (260 lb)   07/18/24 117.9 kg (260 lb)   06/25/24 90.7 kg (200 lb)   05/15/24 90.7 kg (200 lb)   02/25/24 95.6 kg (210 lb 12.2 oz)   01/20/24 117.9 kg (260 lb)     Weight Change(s) Since Admission:   (2/27) admission weight estimated, took bed weight 68.7 kg during rounds  Wt Readings from Last 1 Encounters:   02/27/25 1622 68.7 kg (151 lb 7.3 oz)   02/26/25 2027 68 kg (150 lb)   Admit Weight: 68 kg (150 lb) (02/26/25 2027), Weight Method: Estimated    Estimated Needs    Weight Used For Calorie Calculations: 68 kg (149 lb 14.6 oz)  Energy Calorie Requirements (kcal): 1700, 25 kcal/kg     Weight Used For Protein Calculations: 68 kg (149 lb 14.6 oz)  Protein Requirements: 102-123 g, 1.5-1.8 g/kg  Fluid Requirements (mL): 1700, 1 ml/kcal  CHO Requirement: N/A     Enteral " Nutrition     Formula: Peptamen AF  Rate/Volume: 20 ml/hr  Water Flushes: none  Additives/Modulars: none at this time  Route:  gastrostomy/jejunostomy tube  - nurse reports jejunostomy tube residing in duodenum  Method: continuous  Total Nutrition Provided by Tube Feeding, Additives, and Flushes:  Calories Provided  480 kcal/d, 28% needs   Protein Provided  30 g/d, 29% needs   Fluid Provided  324 ml/d, 19% needs   Continuous feeding calculations based on estimated 20 hr/d run time unless otherwise stated.    Parenteral Nutrition Patient not receiving parenteral nutrition support at this time.    Evaluation of Received Nutrient Intake    Calories: not meeting estimated needs  Protein: not meeting estimated needs    Patient Education Not applicable.    Nutrition Diagnosis     PES: Inadequate energy intake related to inability to consume sufficient nutrients as evidenced by less than 80% needs met. (active)    PES: Severe chronic disease or condition related malnutrition Related to  As Evidenced by:  - weight loss: > 20% in 1 year - muscle mass depletion: 1 area of severe muscle loss (Clavicle) - loss of subcutaneous fat: 1 area of severe fat loss (Triceps Skinfold) active    Nutrition Interventions     Intervention(s): modified composition of enteral nutrition, modified rate of enteral nutrition, and collaboration with other providers    Goal: Meet greater than 80% of nutritional needs by follow-up. (new)  Goal: Tolerate enteral feeding at goal rate by follow-up. (new)    Nutrition Goals & Monitoring     Dietitian will monitor: energy intake, enteral nutrition intake, weight, weight change, electrolyte/renal panel, beliefs/attitudes, glucose/endocrine profile, and gastrointestinal profile  Discharge planning: too early to determine; pending clinical course  Nutrition Risk/Follow-Up: high (follow-up in 1-4 days)   Please consult if re-assessment needed sooner.

## 2025-02-27 NOTE — NURSING
[facility] 7th [facility] ICU  Wound Care    Patient Name:  Delio Daniel Jr.   MRN:  [id number]  Date: 2/27/2025  Diagnosis: Anemia requiring transfusions    History:     Past Medical History:   Diagnosis Date    Arthritis     Atrial fibrillation     BPH (benign prostatic hyperplasia)     Cardiac arrest     Coronary artery disease     Cyst, kidney, acquired     Diverticulosis     Hyperlipidemia     Hypertension     MI (myocardial infarction)     Obesity     Steatosis of liver     Stroke        Social History[1]    Precautions:     Allergies as of 02/26/2025    (No Known Allergies)       [facility] Assessment Details/Treatment      02/27/25 1130        Wound 01/05/25 2100 Pressure Injury medial Coccyx #1   Date First Assessed/Time First Assessed: 01/05/25 2100   Present on Original Admission: Yes  Primary Wound Type: Pressure Injury  Orientation: medial  Location: Coccyx  Wound Number: #1  Is this injury device related?: No   Wound Image    Pressure Injury Stage 4   Dressing Appearance Intact;Moist drainage   Drainage Amount Moderate   Drainage Characteristics/Odor Serosanguineous   Appearance Red;Yellow;Slough;Not granulating   Tissue loss description Full thickness   Red (%), Wound Tissue Color 70 %   Yellow (%), Wound Tissue Color 30 %   Periwound Area Denuded   Wound Edges Irregular   Wound Length (cm) 11 cm   Wound Width (cm) 8 cm   Wound Depth (cm) 3 cm   Wound Volume (cm^3) 138.23 cm^3   Wound Surface Area (cm^2) 69.11 cm^2   Undermining (depth (cm)/location) 1-5 ck ~6cm and 7-11 ck 3cm   Care Cleansed with:;Antimicrobial agent   Dressing Gauze, wet to dry;Rolled gauze  (with vashe sol)        Wound 08/19/24 1500 Pressure Injury Left anterior Leg   Date First Assessed/Time First Assessed: 08/19/24 1500   Present on Original Admission: Yes  Primary Wound Type: Pressure Injury  Side: Left  Orientation: anterior  Location: Leg   Wound Image   (knee)   Pressure Injury Stage 2   Dressing Appearance Intact    Drainage Amount Scant   Drainage Characteristics/Odor Serosanguineous;No odor   Appearance Pink;Red;Moist   Tissue loss description Partial thickness   Red (%), Wound Tissue Color 100 %   Periwound Area Dry;Intact   Wound Edges Irregular   Wound Length (cm) 0.6 cm   Wound Width (cm) 0.8 cm   Wound Surface Area (cm^2) 0.38 cm^2   Care Antimicrobial agent   Dressing Foam        Wound 08/19/24 1500 Pressure Injury Left lateral Ankle   Date First Assessed/Time First Assessed: 08/19/24 1500   Present on Original Admission: Yes  Primary Wound Type: Pressure Injury  Side: Left  Orientation: lateral  Location: Ankle   Pressure Injury Stage U   Drainage Amount None   Appearance Maroon;Dry   Tissue loss description Partial thickness   Periwound Area Dry   Wound Edges Irregular   Wound Length (cm) 0.3 cm   Wound Width (cm) 0.3 cm   Wound Surface Area (cm^2) 0.07 cm^2   Care Cleansed with:;Antimicrobial agent;Povidone iodine   Dressing Foam        Wound 01/06/25 1000 Pressure Injury Left lateral;distal Foot   Date First Assessed/Time First Assessed: 01/06/25 1000   Present on Original Admission: Yes  Primary Wound Type: Pressure Injury  Side: Left  Orientation: lateral;distal  Location: Foot   Wound Image    Pressure Injury Stage   (2-3 stage)   Dressing Appearance Intact;Moist drainage   Drainage Amount Small   Drainage Characteristics/Odor Serosanguineous;No odor   Appearance Red;Moist;Bleeding   Tissue loss description Partial thickness   Red (%), Wound Tissue Color 100 %   Periwound Area Dry   Wound Edges Irregular   Wound Length (cm) 2.4 cm   Wound Width (cm) 1 cm   Wound Surface Area (cm^2) 1.88 cm^2   Care Cleansed with:;Antimicrobial agent   Dressing Calcium alginate;Foam   Wocn consult-sacrum  68 y/o male in with need for transfusion with chronic anemia.    He is a longterm stroke contracted vent dependant  pt.  Non verbal non responsive but eyes do open wide with turning  or wound care.  Completely dependant.     Buttock wd noted with photo see description and various bony prominence issues also noted.  Care put in place-    He is on an ICU total care bed and a specialty bed has been ordered.    Pt to be turned q2hrs with wedge and offloading boots in place per staffing per shift.       Addendum- Wound clinic  now consulted and revisited with TRUMAN Dewitt.  02/27/2025         [1]   Social History  Socioeconomic History    Marital status:     Number of children: 9   Occupational History    Occupation: retired   Tobacco Use    Smoking status: Never    Smokeless tobacco: Never   Substance and Sexual Activity    Alcohol use: Not Currently    Drug use: Not Currently    Sexual activity: Not Currently     Partners: Female     Social Drivers of Health     Financial Resource Strain: Patient Unable To Answer (2/27/2025)    Overall Financial Resource Strain (CARDIA)     Difficulty of Paying Living Expenses: Patient unable to answer   Food Insecurity: Patient Unable To Answer (2/27/2025)    Hunger Vital Sign     Worried About Running Out of Food in the Last Year: Patient unable to answer     Ran Out of Food in the Last Year: Patient unable to answer   Transportation Needs: Patient Unable To Answer (1/6/2025)    TRANSPORTATION NEEDS     Transportation : Patient unable to answer   Physical Activity: Sufficiently Active (8/5/2024)    Exercise Vital Sign     Days of Exercise per Week: 5 days     Minutes of Exercise per Session: 30 min   Stress: Patient Unable To Answer (2/27/2025)    [employer] - Occupational Stress Questionnaire     Feeling of Stress : Patient unable to answer   Housing Stability: Patient Unable To Answer (2/27/2025)    Housing Stability Vital Sign     Unable to Pay for Housing in the Last Year: Patient unable to answer     Homeless in the Last Year: Patient unable to answer

## 2025-02-27 NOTE — ED NOTES
Called and spoke with emergency contact Ari Daniel (daughter), received consent for blood administration. 2 witnesses present, consent signed and placed on chart.

## 2025-02-27 NOTE — HPI
"Wound medicine consult    The patient is a 69 year old BM who presented to [facility] ED on 2/26/25 from [facility] due to low hemoglobin of 6 on lab work done that day. Repeat ED labs significant for H/H 7.1/24.6; he has received 2u PRBC and improved to 8.7/28.1 today. Records reveals history of coffee ground emesis and recent + FOBT in January 2025.  GI was consulted and the patient admitted to ICU for management due to chronic vent dependence via trach.   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 Patient is non-verbal at baseline, contracted upper and lower extremities on the left side. 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 Recent admission at [facility] in November 2024 for multidrug resistant UTI and volume depletion. Hew has a history of aspiration pneumonia as well as bacteremia. He received multiple IV antibiotics during prior admissions guided by ID.  He was discharged on ertapenem with planned end date of 1/14/25. He returns with PICC line; unsure of what he may have been receiving prior to admission if this line was still in use.     Patient admitted with large stage IV pressure ulcer of the sacrum; he was seen by us during prior encounter when this wound was covered with devitalized and necrotic tissue with scant bone exposed, it has since evolved as expected with exposed muscle/bone. He was seen by inpatient wound nurse today and wound and offloading orders put in place. Wound medicine consulted as well for evaluation of suspected osteomyelitis.   CT of chest/abd/pelv done on 2/27/25 shows "interval worsening of a sacral decubitus ulcer when compared to 11/12/24. Portions of the superior coccyx are no longer visible, concerning for osteomyelitis. Gas containing fluid collection " "noted posterior to the sacrum measuring 2.4 cm x 1.5 cm, cannot exclude an abscess"    2/27/25 - initial evaluation for this encounter done today. Met patient in ICU bed 702, accompanied by WOCN nurse and discussed patient care with nurse caring for patient today. He is lying on left side with use of purple wedge. He has eyes open and makes eye contact when I call his name. He appears comfortable without signs of apparent distress. Tolerates wound evaluation and turning/repositioning well.   "

## 2025-02-27 NOTE — SUBJECTIVE & OBJECTIVE
Scheduled Meds:   amiodarone  200 mg Per G Tube Daily    atorvastatin  10 mg Per G Tube Daily    busPIRone  5 mg Per G Tube TID    cholestyramine-aspartame  1 packet Per G Tube BID    finasteride  5 mg Per G Tube Daily    metoprolol  5 mg Intravenous Once    metoprolol tartrate  25 mg Per G Tube BID    mupirocin   Nasal BID    pantoprazole  40 mg Intravenous BID    QUEtiapine  25 mg Per G Tube BID    sucralfate  1 g Per G Tube QID (AC & HS)    venlafaxine  75 mg Per G Tube BID     Continuous Infusions:   0.9% NaCl   Intravenous Continuous 100 mL/hr at 02/27/25 1117 New Bag at 02/27/25 1117     PRN Meds:  Current Facility-Administered Medications:     0.9%  NaCl infusion (for blood administration), , Intravenous, Q24H PRN    albuterol-ipratropium, 3 mL, Nebulization, Q6H PRN    sodium chloride 0.9%, 10 mL, Intravenous, PRN    Review of patient's allergies indicates:  No Known Allergies     Past Medical History:   Diagnosis Date    Arthritis     Atrial fibrillation     BPH (benign prostatic hyperplasia)     Cardiac arrest     Coronary artery disease     Cyst, kidney, acquired     Diverticulosis     Hyperlipidemia     Hypertension     MI (myocardial infarction)     Obesity     Steatosis of liver     Stroke      Past Surgical History:   Procedure Laterality Date    A-V CARDIAC PACEMAKER INSERTION Right     CARDIAC CATHETERIZATION      COLONOSCOPY W/ BIOPSIES      CRANIECTOMY Right 12/20/2023    Procedure: CRANIECTOMY;  Surgeon: Artem Can MD;  Location: [facility];  Service: Neurosurgery;  Laterality: Right;    ESOPHAGOGASTRODUODENOSCOPY W/ PEG N/A 1/2/2024    Procedure: PEG;  Surgeon: Tani Day MD;  Location: [facility] ENDOSCOPY;  Service: Gastroenterology;  Laterality: N/A;    ESOPHAGOGASTRODUODENOSCOPY W/ PEG N/A 10/30/2024    Procedure: EGD w/ Jtube placement;  Surgeon: Gabriele Salcido MD;  Location: [facility] ENDOSCOPY;  Service: Endoscopy;  Laterality: N/A;  with J tube extension    excision of colon       TRACHEOSTOMY N/A 12/29/2023    Procedure: CREATION, TRACHEOSTOMY;  Surgeon: Patricia Winslow MD;  Location: [facility] OR;  Service: ENT;  Laterality: N/A;  REQ 1130 //  NEEDS 2 SCRUBS       Family History       Problem Relation (Age of Onset)    Hypertension Mother, Father, Sister          Tobacco Use    Smoking status: Never    Smokeless tobacco: Never   Substance and Sexual Activity    Alcohol use: Not Currently    Drug use: Not Currently    Sexual activity: Not Currently     Partners: Female     Review of Systems   Unable to perform ROS: Patient nonverbal       Objective:     Vital Signs (Most Recent):  Temp: 98.5 °F (36.9 °C) (02/27/25 0750)  Pulse: 96 (02/27/25 1210)  Resp: (!) 0 (02/27/25 1210)  BP: (!) 170/89 (02/27/25 1210)  SpO2: 100 % (02/27/25 1210) Vital Signs (24h Range):  Temp:  [97.8 °F (36.6 °C)-98.5 °F (36.9 °C)] 98.5 °F (36.9 °C)  Pulse:  [] 96  Resp:  [0-24] 0  SpO2:  [93 %-100 %] 100 %  BP: ()/() 170/89     Weight: 68 kg (150 lb)  Body mass index is 23.49 kg/m².     Physical Exam  Vitals reviewed.   Constitutional:       General: He is awake.      Comments: He is awake with eyes wide open and tracks when name is called.          HENT:      Head:      Comments: Right bone flap      Neck:      Comments: Tracheostomy   Pulmonary:      Comments: Mechanical ventilation    Abdominal:      Comments: PEG/J-Tube   Skin:     General: Skin is warm and dry.      Capillary Refill: Capillary refill takes less than 2 seconds.      Findings: Wound present.             Comments:        Neurological:      Comments: Left upper and lower extremity contracted  Moves RUE       Sacrum: 10 x 13 x 3 cm with undermining from 1 - 5 o'clock with depth of 6 cm and undermining from 7 - 11 o'clock depth of 4 cm       Sacral ulcer - right lateral undermining/pouch       Left lateral knee: 0.8 x 0.4 x 0.1 cm       Left lateral foot: proximal area of dry peeling skin, no open wound today. Distal wound irregular  shape with red wound bed: 2.5 x 2 cm       Left medial foot/ankle: 1 x 1 cm                  Laboratory:    BMP:   Recent Labs   Lab 02/26/25 2107 02/27/25  0855    140   K 3.2* 3.7   * 109*   CO2 24 24   BUN 20.2 16.6   CREATININE 0.63* 0.66*   CALCIUM 8.8 9.5   MG 2.30  --        CBC:   Recent Labs   Lab 02/27/25  0855   WBC 7.29   RBC 3.19*   HGB 8.7*   HCT 28.1*      MCV 88.1   MCH 27.3   MCHC 31.0*     CMP:   Recent Labs   Lab 02/26/25 2107 02/27/25  0855   CALCIUM 8.8 9.5   ALBUMIN 2.5*  --     140   K 3.2* 3.7   CO2 24 24   * 109*   BUN 20.2 16.6   CREATININE 0.63* 0.66*   ALKPHOS 86  --    ALT 8  --    AST 16  --    BILITOT 1.2  --      Coagulation:   Recent Labs   Lab 02/26/25 2107   INR 1.4*   APTT 35.1*       ESR:   Recent Labs   Lab 02/21/25  0516   SEDRATE 70*     LFTs:   Recent Labs   Lab 02/26/25 2107   ALT 8   AST 16   ALKPHOS 86   BILITOT 1.2   ALBUMIN 2.5*       Microbiology Results (last 7 days)       Procedure Component Value Units Date/Time    Blood Culture #1 **CANNOT BE ORDERED STAT** [[id number]] Collected: 02/27/25 1318    Order Status: Resulted Specimen: Blood Updated: 02/27/25 1331    Blood Culture #1 **CANNOT BE ORDERED STAT** [[id number]] Collected: 02/27/25 1327    Order Status: Resulted Specimen: Blood Updated: 02/27/25 1331    Respiratory Culture [[id number]] Collected: 02/27/25 0446    Order Status: Completed Specimen: Sputum Updated: 02/27/25 0724     GRAM STAIN Quality 1+      Many Gram Positive Rods      Many Gram Negative Rods    Gram Stain [[id number]] Collected: 02/27/25 0446    Order Status: Completed Specimen: Sputum Updated: 02/27/25 0724     GRAM STAIN Quality 1+      Many Gram Positive Rods      Many Gram Negative Rods    Urine culture [[id number]] Collected: 02/27/25 0057    Order Status: Sent Specimen: Urine Updated: 02/27/25 0111              Recent Labs   Lab 02/27/25  5911   COLORU Yellow   PHUR 7.0   PROTEINUA 1+*   BACTERIA None  Seen   NITRITE Negative   LEUKOCYTESUR 75*   UROBILINOGEN 8.0*       Diagnostic Results:  I have reviewed all pertinent imaging results/findings within the past 24 hours.

## 2025-02-27 NOTE — PROGRESS NOTES
"Pharmacokinetic Initial Assessment: IV Vancomycin    Assessment/Plan:    Initiate intravenous vancomycin with loading dose of 1500 mg once followed by a maintenance dose of vancomycin 1000 mg IV every 12 hours  Desired empiric serum trough concentration is 15 to 20 mcg/mL  Draw vancomycin trough level 60 min prior to fourth dose on 02/28 at approximately 1600  Pharmacy will continue to follow and monitor vancomycin.      Please contact pharmacy at extension [phone] with any questions regarding this assessment.     Thank you for the consult,   Sanford Howardy       Patient brief summary:  Delio Daniel Jr. is a 69 y.o. male initiated on antimicrobial therapy with IV Vancomycin for treatment of suspected sepsis    Drug Allergies:   Review of patient's allergies indicates:  No Known Allergies    Actual Body Weight:   68kg    Renal Function:   Estimated Creatinine Clearance: 104.9 mL/min (A) (based on SCr of 0.63 mg/dL (L)).,     Dialysis Method (if applicable):  N/A    CBC (last 72 hours):  Recent Labs   Lab Result Units 02/26/25 0435 02/26/25 2107   WBC x10(3)/mcL 7.02 6.47   Hgb g/dL 6.3* 7.1*   Hct % 22.8* 24.6*   Platelet x10(3)/mcL 318 329   Mono % % 11.1 7.0   Eos % % 1.6 0.6   Basophil % % 0.7 0.5       Metabolic Panel (last 72 hours):  Recent Labs   Lab Result Units 02/26/25 0435 02/26/25 2107 02/27/25  0057 02/27/25  0445   Sodium mmol/L 144 143  --   --    Potassium mmol/L 3.3* 3.2*  --   --    Chloride mmol/L 106 108*  --   --    CO2 mmol/L 25 24  --   --    Glucose mg/dL 64* 90  --   --    Glucose, UA   --   --  Normal Normal   Blood Urea Nitrogen mg/dL 23.8 20.2  --   --    Creatinine mg/dL 0.64* 0.63*  --   --    Albumin g/dL 2.3* 2.5*  --   --    Bilirubin Total mg/dL 1.2 1.2  --   --    ALP unit/L 84 86  --   --    AST unit/L 17 16  --   --    ALT unit/L 11 8  --   --    Magnesium Level mg/dL 2.30 2.30  --   --        Drug levels (last 3 results):  No results for input(s): "VANCOMYCINRA", "VANCORANDOM", " ""VANCOMYCINPE", "VANCOPEAK", "VANCOMYCINTR", "VANCOTROUGH" in the last 72 hours.    Microbiologic Results:  Microbiology Results (last 7 days)       Procedure Component Value Units Date/Time    Respiratory Culture [[id number]] Collected: 02/27/25 0446    Order Status: Sent Specimen: Sputum Updated: 02/27/25 0446    Gram Stain [[id number]] Collected: 02/27/25 0446    Order Status: Sent Specimen: Sputum Updated: 02/27/25 0446    Blood Culture #1 **CANNOT BE ORDERED STAT** [[id number]]     Order Status: Sent Specimen: Blood     Blood Culture #1 **CANNOT BE ORDERED STAT** [[id number]]     Order Status: Sent Specimen: Blood     Urine culture [[id number]] Collected: 02/27/25 0057    Order Status: Sent Specimen: Urine Updated: 02/27/25 0111            "

## 2025-02-27 NOTE — H&P
[facility] - Emergency Dept  Pulmonary Critical Care Note    Patient Name: Delio Daniel Jr.  MRN: [id number]  Admission Date: 2/26/2025  Hospital Length of Stay: 0 days  Code Status: Prior  Attending Provider: Efrain Salcido MD  Primary Care Provider: No primary care provider on file.     Subjective:     HPI:   Delio Daniel Jr. is a 69 y.o. male nursing home resident at [facility] with a pmh of HTN, CAD, STEMI 2003, hemorrhagic CVA s/p trach PEG 2023, Afib, PM/AICD for history of AV block and Vfib arrest, and recurrent UTI, who presented to [facility] ED on 2/26/2025 given low hemoglobin of 6 on lab work drawn the same day. Patient is nonverbal, unable to obtain additional history. Repeat ED labs significant for H/H 7.1/24.6, 2u pRBC ordered. Additional workup with CT Chest Abd Pelv with IV Contrast showed bilateral lower lobe opacities consistent with possible atypical pneumonia or recurrent pneumonia, in additional to bladder wall thickening. Empiric IV antibiotics initiated in ED with Vanc, Zosyn. UA positive for leuk esterase, but ED nurse reports Fernandez catheter not exchanged. Will place new Fernandez and obtain UA. Chart review showed mixed iron deficiency anemia and anemia of chronic inflammation. History of coffee ground emesis in the past and positive FOBT 1 month prior to admission. Consider GI evaluation. Admitted to ICU for management of anemia and pneumonia, vent dependent via trach.    Of note, patient seen alone in ED room with trach tubing disconnected from the vent with multiple alarms sounding. Patient appeared uncomfortable. Tubing reconnected by ICU/IM resident upon entering room.     Hospital Course/Significant events:  2/27/2025: Admitted to ICU for management of anemia and pneumonia, vent dependent via trach.    24 Hour Interval History:  N/A    Past Medical History:   Diagnosis Date    Arthritis     Atrial fibrillation     BPH (benign prostatic hyperplasia)     Cardiac arrest      Coronary artery disease     Cyst, kidney, acquired     Diverticulosis     Hyperlipidemia     Hypertension     MI (myocardial infarction)     Obesity     Steatosis of liver     Stroke        Past Surgical History:   Procedure Laterality Date    A-V CARDIAC PACEMAKER INSERTION Right     CARDIAC CATHETERIZATION      COLONOSCOPY W/ BIOPSIES      CRANIECTOMY Right 12/20/2023    Procedure: CRANIECTOMY;  Surgeon: Artme Can MD;  Location: [facility];  Service: Neurosurgery;  Laterality: Right;    ESOPHAGOGASTRODUODENOSCOPY W/ PEG N/A 1/2/2024    Procedure: PEG;  Surgeon: Tani Day MD;  Location: [facility] ENDOSCOPY;  Service: Gastroenterology;  Laterality: N/A;    ESOPHAGOGASTRODUODENOSCOPY W/ PEG N/A 10/30/2024    Procedure: EGD w/ Jtube placement;  Surgeon: Gabriele Salcido MD;  Location: [facility] ENDOSCOPY;  Service: Endoscopy;  Laterality: N/A;  with J tube extension    excision of colon      TRACHEOSTOMY N/A 12/29/2023    Procedure: CREATION, TRACHEOSTOMY;  Surgeon: Patricia Winslow MD;  Location: [facility];  Service: ENT;  Laterality: N/A;  REQ 1130 //  NEEDS 2 SCRUBS       Social History[1]        Current Outpatient Medications   Medication Instructions    amiodarone (PACERONE) 200 mg, Per G Tube, Daily    apixaban (ELIQUIS) 5 mg, Oral, 2 times daily    ascorbic Acid (VITAMIN C) 500 mg, 2 times daily, Per PEG tube    busPIRone (BUSPAR) 5 mg, Per G Tube, 3 times daily    cholestyramine (QUESTRAN) 4 gram packet 4 g, Daily, Via PEG tube    cholestyramine-aspartame (QUESTRAN LIGHT) 4 gram PwPk 4 g, Per G Tube, 2 times daily    finasteride (PROSCAR) 5 mg, Per G Tube, Daily    furosemide (LASIX) 80 mg, Per G Tube, As needed (PRN)    L. acidophilus/L.bulgaricus (FLORANEX ORAL) 1 packet, PEG Tube, Daily    levetiracetam 1,500 mg, Per G Tube, 2 times daily, Nursing home reports medication hold by MD until level redraw on Monday.    LIPITOR 10 mg, Per G Tube, Daily    metoprolol tartrate (LOPRESSOR) 25 mg, Per G  Tube, 2 times daily    miconazole NITRATE 2 % (MICOTIN) 2 % top powder Topical (Top), 2 times daily    multivitamin (THERAGRAN) per tablet 1 tablet, Per G Tube, Daily    polyethylene glycol (GLYCOLAX) 17 g, Per G Tube, 2 times daily PRN    protein supplement (PROMOD PROTEIN ORAL) 30 mLs, Per G Tube, Daily    QUEtiapine (SEROQUEL) 25 mg, Per G Tube, 2 times daily    scopolamine (TRANSDERM-SCOP) 1.3-1.5 mg (1 mg over 3 days) 1 patch, Transdermal, Every 3 days    sucralfate (CARAFATE) 1 g, Per G Tube, Before meals & nightly    venlafaxine 75 mg TR24 1 tablet, Per G Tube, 2 times daily       Review of patient's allergies indicates:  No Known Allergies     Current Inpatient Medications   piperacillin-tazobactam (Zosyn) IV (PEDS and ADULTS) (extended infusion is not appropriate)  4.5 g Intravenous ED 1 Time    vancomycin 1,500 mg in D5W 250 mL IVPB (admixture device)  1,500 mg Intravenous ED 1 Time       Current Intravenous Infusions        Review of Systems   Unable to perform ROS: Mental acuity          Objective:     No intake or output data in the 24 hours ending 02/27/25 0242      Vital Signs (Most Recent):  Temp: 98.3 °F (36.8 °C) (02/27/25 0224)  Pulse: 90 (02/27/25 0224)  Resp: 20 (02/27/25 0147)  BP: 95/69 (02/27/25 0224)  SpO2: 100 % (02/27/25 0224)  Body mass index is 23.49 kg/m².  Weight: 68 kg (150 lb) Vital Signs (24h Range):  Temp:  [97.9 °F (36.6 °C)-98.3 °F (36.8 °C)] 98.3 °F (36.8 °C)  Pulse:  [] 90  Resp:  [18-20] 20  SpO2:  [99 %-100 %] 100 %  BP: ()/() 95/69     Physical Exam  Constitutional:       Appearance: He is ill-appearing. He is not diaphoretic.   HENT:      Mouth/Throat:      Mouth: Mucous membranes are moist.   Eyes:      General: No scleral icterus.  Cardiovascular:      Rate and Rhythm: Tachycardia present.   Pulmonary:      Effort: No respiratory distress.      Breath sounds: Rhonchi present.      Comments: Ventilated via trach  Abdominal:      General: There is no  "distension.      Palpations: Abdomen is soft.      Comments: G tube site appears clean, dressing in place   Genitourinary:     Comments: Fernandez in place with dark jake/orange urine in bag  Musculoskeletal:      Right lower leg: No edema.      Left lower leg: No edema.      Comments: Minimal movement of right sided fingers and toes, left-sided upper and lower extremity contracture with muscle wasting    Neurological:      Comments: Non-verbal, tracks with eyes, appears to follow commands by squeezing right hand---very weak, moved toes on right foot when prompted, left sided upper and lower extremity contracture           Lines/Drains/Airways       Drain  Duration                  Urethral Catheter 16 Fr. -- days         Gastrostomy/Enterostomy 10/30/24 1200 Gastrostomy-jejunostomy feeding 119 days              Airway  Duration             Adult Surgical Airway 08/19/24 0120 Shiley Extra Large Cuffed Distal 6.0/ 75mm 192 days              Peripheral Intravenous Line  Duration                  Midline Catheter - Double Lumen Right basilic vein (medial side of arm) -- days                    Significant Labs:    Lab Results   Component Value Date    WBC 6.47 02/26/2025    HGB 7.1 (L) 02/26/2025    HCT 24.6 (L) 02/26/2025    MCV 92.1 02/26/2025     02/26/2025           BMP  Lab Results   Component Value Date     02/26/2025    K 3.2 (L) 02/26/2025    CO2 24 02/26/2025    BUN 20.2 02/26/2025    CREATININE 0.63 (L) 02/26/2025    CALCIUM 8.8 02/26/2025    AGAP 11.0 02/26/2025    EGFRNONAA 61 04/23/2022         ABG  No results for input(s): "PH", "PO2", "PCO2", "HCO3", "POCBASEDEF" in the last 168 hours.    Mechanical Ventilation Support:  Vent Mode: SIMV (02/27/25 0035)  Set Rate: 20 BPM (02/27/25 0035)  Vt Set: 500 mL (02/27/25 0035)  PEEP/CPAP: 5 cmH20 (02/27/25 0035)  Oxygen Concentration (%): 30 (02/27/25 0035)  Peak Airway Pressure: 18 cmH20 (02/27/25 0035)  Total Ve: 8.9 L/m (02/27/25 0035)  F/VT Ratio<105 " (RSBI): (!) 42.37 (02/26/25 2027)      Significant Imaging:  I have reviewed the pertinent imaging within the past 24 hours.    X-Ray Abdomen AP 1 View  Result Date: 2/26/2025  Gaseous distension of the abdomen with no other significant abnormalities identified. Changes in the left hip Electronically signed by: Vik Keen Date:    02/26/2025 Time:    11:56       Assessment/Plan:     Assessment  Normocytic anemia, consider GI bleed, mixed BINU and ACI  Atypical pneumonia vs recurrent pneumonia, hx of aspiration  Cystitis   Afib on Eliquis  Chronic respiratory failure  Hx hemorrhagic stroke with residual left-sided weakness  Seizure      Plan  - Admit to ICU for close monitoring on vent  - Continue vent settings, chronic vent dependent, trach/PEG  - Obtain iron studies  - Consider GI consult given recent positive FOBT in setting of anemia, patient on anticoagulation  - Continue Vanc, Zosyn  - Repeat UA after Fernandez catheter exchanged  - Follow up blood culture, urine culture, resp culture  - Obtain post transfusion H/H following 2u pRBC  - Follow up lactic acid   - Check Keppra level before resuming home meds, elevated previously    DVT Prophylaxis: Eliquis  GI Prophylaxis: PPI     32 minutes of critical care was time spent personally by me on the following activities: development of treatment plan with patient or surrogate and bedside caregivers, discussions with consultants, evaluation of patient's response to treatment, examination of patient, ordering and performing treatments and interventions, ordering and review of laboratory studies, ordering and review of radiographic studies, pulse oximetry, re-evaluation of patient's condition.  This critical care time did not overlap with that of any other provider or involve time for any procedures.     Amina Dolan MD  Pulmonary Critical Care Medicine  [facility] - Emergency Dept  DOS: 02/27/2025           [1]   Social History  Socioeconomic History     Marital status:     Number of children: 9   Occupational History    Occupation: retired   Tobacco Use    Smoking status: Never    Smokeless tobacco: Never   Substance and Sexual Activity    Alcohol use: Not Currently    Drug use: Not Currently    Sexual activity: Not Currently     Partners: Female     Social Drivers of Health     Financial Resource Strain: Patient Unable To Answer (1/6/2025)    Overall Financial Resource Strain (CARDIA)     Difficulty of Paying Living Expenses: Patient unable to answer   Food Insecurity: Patient Unable To Answer (1/6/2025)    Hunger Vital Sign     Worried About Running Out of Food in the Last Year: Patient unable to answer     Ran Out of Food in the Last Year: Patient unable to answer   Transportation Needs: Patient Unable To Answer (1/6/2025)    TRANSPORTATION NEEDS     Transportation : Patient unable to answer   Physical Activity: Sufficiently Active (8/5/2024)    Exercise Vital Sign     Days of Exercise per Week: 5 days     Minutes of Exercise per Session: 30 min   Stress: Patient Unable To Answer (1/6/2025)    [employer] - Occupational Stress Questionnaire     Feeling of Stress : Patient unable to answer   Housing Stability: Patient Unable To Answer (1/6/2025)    Housing Stability Vital Sign     Unable to Pay for Housing in the Last Year: Patient unable to answer     Homeless in the Last Year: Patient unable to answer

## 2025-02-27 NOTE — CONSULTS
Consult Note    Reason for Consult:      We were consulted to evaluate this patient for symptomatic anemia.     HPI:     69 y.o. AA male known to Dr. 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       EGD/PEG (initial placement) 01/02/2024: relatively unremarkable exam, and a 24 FR peg was placed with external bumper at 4 cm. Patient with multiple prior admissions during which our group was consulted for similar complaints of coffee-ground emesis and tube feed intolerance.  Also has had prior aspiration pneumonia.  Most recently seen 10/2024 - Plan was to limit use of metoclopramide given history of V-fib arrest (Qtc was normal at that time); had been on reglan 10 TID prior. S/p EGD with PEG exchange for G-J tube extension 10/30/24: Normal esophagus. Intact gastrostomy with a patent G-tube present characterized by healthy appearing mucosa. The PEG was exchanged for a PEG-J. The distal end of the  J-tube was attached to the wall of the 3rd portion of the duodenum with a hemostatic clip. Normal examined duodenum.     Patient presented to the ED last night from the nursing home due to anemia.  On presentation, heart rate 107 otherwise afebrile and VSS.  Labs notable for Normocytic anemia with hemoglobin 6.3, iron low 34,% sat low 16, TIBC low 219, ferritin high 651, potassium 3.2, no leukocytosis.  CT chest/abdomen/pelvis with IV contrast noted normal esophagus, percutaneous gastrostomy tube with balloon tip in the gastric lumen in the tip of the catheter terminates in the transverse portion of the duodenum, patchy ground-glass opacities right middle and lower lobes nonspecific but could represent edema or atypical pneumonia, Small volume fluid secretions also noted  "within the left mainstem bronchus, interval worsening of sacral decubitus ulcer was findings suspicious for osteomyelitis.    Patient was admitted to ICU given vent dependency via trach.  Pulmonology reviewed imaging and feels there is no evidence of pneumonia in the opacities on the left are due to artifact from his hand overlying the field.  Antimicrobials discontinued.  Wound care physician consulted regarding possible osteo.      Patient was transfused 2 units packed red blood cells.  Today, hemoglobin is 8.7.  GI was consulted.    Of note, patient was seen in our clinic yesterday accompanied by his daughter due to reports of vomiting at least once daily over the past week.  He reportedly "hiccups secretions and vomits." Per the nursing home staff, they do check residuals and there never greater than 60.  His Reglan 5 mg was increased to t.i.d. last week.  Per the family, the patient is often not properly positioned in the bed (HOB not elevated to 45°).  Concern was for malfunctioning J-tube.    Nurse states that about 35 minutes after administering meds per the J port, patient vomited.  She states that HR was in the 40s so she went into the room and when she did he had emesis soiling him.  Phlebotomist was in there and said it was projectile.  He was sitting upright when meds were administered and remained in the position.  Emesis looks just like the meds/flush.  Shortly after the episode his HR went into 130s and had to administer metoprolol.  2 brown BMs today, loose in consistency.  No melena or hematochezia.       Patient with a history of anemia within the last year.  He required transfusion transfusion was 11/2024, 2 units for hemoglobin 6.1.  Hemoglobin has been around 8 since that time.  Prior to that, it was 11-12.    FOBT negative x1 and positive x2 1/2025.    Colonoscopy 12/27/2018: 3 mm hyperplastic sigmoid polyp; no malignancy    Hx of pSBO secondary to mesenteric mass consistent with carcinoid. " pT3,4 pN2 MX, at least stage III, well-differentiated neuroendocrine tumor of small bowel, multifocal, grade 1; large mesenteric mass (3.8 cm); 2:21 lymph nodes involved; small multifocal areas of tumor in the efren-intestinal adipose tissue. Status post resection of 127 cm of small bowel and mesenteric mass in the base of the mesentery, on 11/02/2018.  Previously followed by Dr. Reed     History obtained via chart review and nurse report due to patient being nonverbal.  Per nurse, patient with no episodes of emesis today and attempted to suction trach nonproductive.      Previous records reviewed. Collateral information obtained from family member present at bedside.    PCP:  No primary care provider on file.    Review of patient's allergies indicates:  No Known Allergies     Current Medications[1]  Prescriptions Prior to Admission[2]    Past Medical History:  Past Medical History:   Diagnosis Date    Arthritis     Atrial fibrillation     BPH (benign prostatic hyperplasia)     Cardiac arrest     Coronary artery disease     Cyst, kidney, acquired     Diverticulosis     Hyperlipidemia     Hypertension     MI (myocardial infarction)     Obesity     Steatosis of liver     Stroke       Past Surgical History:  Past Surgical History:   Procedure Laterality Date    A-V CARDIAC PACEMAKER INSERTION Right     CARDIAC CATHETERIZATION      COLONOSCOPY W/ BIOPSIES      CRANIECTOMY Right 12/20/2023    Procedure: CRANIECTOMY;  Surgeon: Artem Can MD;  Location: [facility] OR;  Service: Neurosurgery;  Laterality: Right;    ESOPHAGOGASTRODUODENOSCOPY W/ PEG N/A 1/2/2024    Procedure: PEG;  Surgeon: Tani Day MD;  Location: [facility] ENDOSCOPY;  Service: Gastroenterology;  Laterality: N/A;    ESOPHAGOGASTRODUODENOSCOPY W/ PEG N/A 10/30/2024    Procedure: EGD w/ Jtube placement;  Surgeon: Gabriele Salcido MD;  Location: [facility] ENDOSCOPY;  Service: Endoscopy;  Laterality: N/A;  with J tube extension    excision of colon       TRACHEOSTOMY N/A 12/29/2023    Procedure: CREATION, TRACHEOSTOMY;  Surgeon: Patricia Winslow MD;  Location: [facility] OR;  Service: ENT;  Laterality: N/A;  REQ 1130 //  NEEDS 2 SCRUBS      Family History:  Family History   Problem Relation Name Age of Onset    Hypertension Mother      Hypertension Father      Hypertension Sister       Social History:  Social History     Tobacco Use    Smoking status: Never    Smokeless tobacco: Never   Substance Use Topics    Alcohol use: Not Currently       Review of Systems:     Review of Systems   Unable to perform ROS: Other   Nonverbal. Trach on vent.     Objective:     VITAL SIGNS: 24 HR MIN & MAX LAST    Temp  Min: 97.8 °F (36.6 °C)  Max: 98.5 °F (36.9 °C)  98.5 °F (36.9 °C)        BP  Min: 95/69  Max: 165/88  (!) 153/87     Pulse  Min: 85  Max: 130  (!) 126     Resp  Min: 0  Max: 24  (!) 0    SpO2  Min: 93 %  Max: 100 %  100 %        Intake/Output Summary (Last 24 hours) at 2/27/2025 0924  Last data filed at 2/27/2025 0800  Gross per 24 hour   Intake 600 ml   Output 500 ml   Net 100 ml       Physical Exam  Constitutional:       General: He is not in acute distress.     Appearance: He is ill-appearing (chronically).   HENT:      Head: Normocephalic and atraumatic.   Eyes:      General: No scleral icterus.  Cardiovascular:      Rate and Rhythm: Normal rate and regular rhythm.   Pulmonary:      Effort: Pulmonary effort is normal. No respiratory distress.      Comments: Trach on vent  Abdominal:      General: Bowel sounds are normal. There is no distension.      Palpations: Abdomen is soft. There is no mass.      Tenderness: There is no abdominal tenderness (seeminly non-tender). There is no guarding or rebound.      Comments: G-J in epigastrium/LUQ c/d/i with no TFs, external bumper at 3.5cm. Prior lap scar noted.   Musculoskeletal:      Right lower leg: No edema.      Left lower leg: No edema.      Comments: contracted   Skin:     General: Skin is warm and dry.      Coloration:  Skin is not jaundiced.   Neurological:      Comments: Reportedly at bedside. Non-verbal. Seems to track.           Recent Results (from the past 48 hours)   Magnesium    Collection Time: 02/26/25  4:35 AM   Result Value Ref Range    Magnesium Level 2.30 1.60 - 2.60 mg/dL   Comprehensive Metabolic Panel    Collection Time: 02/26/25  4:35 AM   Result Value Ref Range    Sodium 144 136 - 145 mmol/L    Potassium 3.3 (L) 3.5 - 5.1 mmol/L    Chloride 106 98 - 107 mmol/L    CO2 25 23 - 31 mmol/L    Glucose 64 (L) 82 - 115 mg/dL    Blood Urea Nitrogen 23.8 8.4 - 25.7 mg/dL    Creatinine 0.64 (L) 0.72 - 1.25 mg/dL    Calcium 8.2 (L) 8.8 - 10.0 mg/dL    Protein Total 6.8 5.8 - 7.6 gm/dL    Albumin 2.3 (L) 3.4 - 4.8 g/dL    Globulin 4.5 (H) 2.4 - 3.5 gm/dL    Albumin/Globulin Ratio 0.5 (L) 1.1 - 2.0 ratio    Bilirubin Total 1.2 <=1.5 mg/dL    ALP 84 40 - 150 unit/L    ALT 11 0 - 55 unit/L    AST 17 5 - 34 unit/L    eGFR >60 mL/min/1.73/m2    Anion Gap 13.0 mEq/L    BUN/Creatinine Ratio 37    CBC with Differential    Collection Time: 02/26/25  4:35 AM   Result Value Ref Range    WBC 7.02 4.50 - 11.50 x10(3)/mcL    RBC 2.48 (L) 4.70 - 6.10 x10(6)/mcL    Hgb 6.3 (L) 14.0 - 18.0 g/dL    Hct 22.8 (L) 42.0 - 52.0 %    MCV 91.9 80.0 - 94.0 fL    MCH 25.4 (L) 27.0 - 31.0 pg    MCHC 27.6 (L) 33.0 - 36.0 g/dL    RDW 21.2 (H) 11.5 - 17.0 %    Platelet 318 130 - 400 x10(3)/mcL    MPV 11.1 (H) 7.4 - 10.4 fL    Neut % 67.1 %    Lymph % 19.1 %    Mono % 11.1 %    Eos % 1.6 %    Basophil % 0.7 %    Imm Grans % 0.4 %    Neut # 4.71 2.1 - 9.2 x10(3)/mcL    Lymph # 1.34 0.6 - 4.6 x10(3)/mcL    Mono # 0.78 0.1 - 1.3 x10(3)/mcL    Eos # 0.11 0 - 0.9 x10(3)/mcL    Baso # 0.05 <=0.2 x10(3)/mcL    Imm Gran # 0.03 0.00 - 0.04 x10(3)/mcL    NRBC% 0.3 %   Blood Smear Microscopic Exam    Collection Time: 02/26/25  4:35 AM   Result Value Ref Range    RBC Morph Abnormal (A) Normal    Acanthocytes 1+ (A) (none)    Anisocytosis 3+ (A) (none)    Hypochromasia  3+ (A) (none)    Macrocytosis 1+ (A) (none)    Microcytosis 1+ (A) (none)    Poikilocytosis 1+ (A) (none)    Platelets Adequate Normal, Adequate   Comprehensive metabolic panel    Collection Time: 02/26/25  9:07 PM   Result Value Ref Range    Sodium 143 136 - 145 mmol/L    Potassium 3.2 (L) 3.5 - 5.1 mmol/L    Chloride 108 (H) 98 - 107 mmol/L    CO2 24 23 - 31 mmol/L    Glucose 90 82 - 115 mg/dL    Blood Urea Nitrogen 20.2 8.4 - 25.7 mg/dL    Creatinine 0.63 (L) 0.72 - 1.25 mg/dL    Calcium 8.8 8.8 - 10.0 mg/dL    Protein Total 7.8 (H) 5.8 - 7.6 gm/dL    Albumin 2.5 (L) 3.4 - 4.8 g/dL    Globulin 5.3 (H) 2.4 - 3.5 gm/dL    Albumin/Globulin Ratio 0.5 (L) 1.1 - 2.0 ratio    Bilirubin Total 1.2 <=1.5 mg/dL    ALP 86 40 - 150 unit/L    ALT 8 0 - 55 unit/L    AST 16 5 - 34 unit/L    eGFR >60 mL/min/1.73/m2    Anion Gap 11.0 mEq/L    BUN/Creatinine Ratio 32    Troponin I    Collection Time: 02/26/25  9:07 PM   Result Value Ref Range    Troponin-I <0.010 0.000 - 0.045 ng/mL   Magnesium    Collection Time: 02/26/25  9:07 PM   Result Value Ref Range    Magnesium Level 2.30 1.60 - 2.60 mg/dL   Protime-INR    Collection Time: 02/26/25  9:07 PM   Result Value Ref Range    PT 17.1 (H) 12.5 - 14.5 seconds    INR 1.4 (H) <=1.3   Type & Screen    Collection Time: 02/26/25  9:07 PM   Result Value Ref Range    Group & Rh A POS     Indirect Kaila GEL NEG     Specimen Outdate 03/01/2025 23:59    APTT    Collection Time: 02/26/25  9:07 PM   Result Value Ref Range    PTT 35.1 (H) 23.2 - 33.7 seconds   Brain natriuretic peptide    Collection Time: 02/26/25  9:07 PM   Result Value Ref Range    Natriuretic Peptide 246.2 (H) <=100.0 pg/mL   CBC with Differential    Collection Time: 02/26/25  9:07 PM   Result Value Ref Range    WBC 6.47 4.50 - 11.50 x10(3)/mcL    RBC 2.67 (L) 4.70 - 6.10 x10(6)/mcL    Hgb 7.1 (L) 14.0 - 18.0 g/dL    Hct 24.6 (L) 42.0 - 52.0 %    MCV 92.1 80.0 - 94.0 fL    MCH 26.6 (L) 27.0 - 31.0 pg    MCHC 28.9 (L) 33.0 -  36.0 g/dL    RDW 21.4 (H) 11.5 - 17.0 %    Platelet 329 130 - 400 x10(3)/mcL    MPV 10.4 7.4 - 10.4 fL    Neut % 70.4 %    Lymph % 21.2 %    Mono % 7.0 %    Eos % 0.6 %    Basophil % 0.5 %    Imm Grans % 0.3 %    Neut # 4.56 2.1 - 9.2 x10(3)/mcL    Lymph # 1.37 0.6 - 4.6 x10(3)/mcL    Mono # 0.45 0.1 - 1.3 x10(3)/mcL    Eos # 0.04 0 - 0.9 x10(3)/mcL    Baso # 0.03 <=0.2 x10(3)/mcL    Imm Gran # 0.02 0.00 - 0.04 x10(3)/mcL    NRBC% 0.0 %   Prepare RBC 2 Units; symptomatic anemia    Collection Time: 02/26/25  9:07 PM   Result Value Ref Range    UNIT NUMBER [id number]     UNIT ABO/RH A POS     DISPENSE STATUS Issued     Unit Expiration [id number]     Product Code M1221S77     Unit Blood Type Code 6200     CROSSMATCH INTERPRETATION Compatible     UNIT NUMBER [id number]     UNIT ABO/RH A POS     DISPENSE STATUS Issued     Unit Expiration [id number]     Product Code A9813E71     Unit Blood Type Code 6200     CROSSMATCH INTERPRETATION Compatible    Iron and TIBC    Collection Time: 02/26/25  9:07 PM   Result Value Ref Range    Iron Binding Capacity Unsaturated 185 60 - 240 ug/dL    Iron Level 34 (L) 65 - 175 ug/dL    Transferrin 206 163 - 344 mg/dL    Iron Binding Capacity Total 219 (L) 250 - 450 ug/dL    Iron Saturation 16 (L) 20 - 50 %   Ferritin    Collection Time: 02/26/25  9:07 PM   Result Value Ref Range    Ferritin Level 651.13 (H) 21.81 - 274.66 ng/mL   Urinalysis, Reflex to Urine Culture    Collection Time: 02/27/25 12:57 AM    Specimen: Urine   Result Value Ref Range    Color, UA Yellow Yellow, Light-Yellow, Colorless, Straw, Dark-Yellow    Appearance, UA Turbid (A) Clear    Specific Gravity, UA 1.027 1.005 - 1.030    pH, UA 6.0 5.0 - 8.5    Protein, UA 1+ (A) Negative    Glucose, UA Normal Negative, Normal    Ketones, UA Negative Negative    Blood, UA Negative Negative    Bilirubin, UA Negative Negative    Urobilinogen, UA 4.0 (A) 0.2, 1.0, Normal    Nitrites, UA Negative Negative    Leukocyte  Esterase,  (A) Negative    RBC, UA 0-5 None Seen, 0-2, 3-5, 0-5 /HPF    WBC, UA >100 (A) None Seen, 0-2, 3-5, 0-5 /HPF    Bacteria, UA Rare None Seen, Rare, Occasional /HPF    Budding Yeast, UA Many /HPF    Squamous Epithelial Cells, UA None Seen /HPF    Mucous, UA Trace (A) None Seen /LPF    Calcium Oxalate Crystals, UA Few (A) None Seen   Urinalysis, Reflex to Urine Culture    Collection Time: 02/27/25  4:45 AM    Specimen: Urine   Result Value Ref Range    Color, UA Yellow Yellow, Light-Yellow, Colorless, Straw, Dark-Yellow    Appearance, UA Clear Clear    Specific Gravity, UA >1.050 (H) 1.005 - 1.030    pH, UA 7.0 5.0 - 8.5    Protein, UA 1+ (A) Negative    Glucose, UA Normal Negative, Normal    Ketones, UA Negative Negative    Blood, UA Negative Negative    Bilirubin, UA Negative Negative    Urobilinogen, UA 8.0 (A) 0.2, 1.0, Normal    Nitrites, UA Negative Negative    Leukocyte Esterase, UA 75 (A) Negative    RBC, UA 0-5 None Seen, 0-2, 3-5, 0-5 /HPF    WBC, UA 21-50 (A) None Seen, 0-2, 3-5, 0-5 /HPF    Bacteria, UA None Seen None Seen, Trace /HPF    Squamous Epithelial Cells, UA None Seen None Seen, Trace, Rare /HPF    Calcium Oxalate Crystals, UA Trace (A) None Seen   Respiratory Culture    Collection Time: 02/27/25  4:46 AM    Specimen: Sputum   Result Value Ref Range    GRAM STAIN Quality 1+     GRAM STAIN Many Gram Positive Rods     GRAM STAIN Many Gram Negative Rods    Gram Stain    Collection Time: 02/27/25  4:46 AM    Specimen: Sputum   Result Value Ref Range    GRAM STAIN Quality 1+     GRAM STAIN Many Gram Positive Rods     GRAM STAIN Many Gram Negative Rods    MRSA PCR    Collection Time: 02/27/25  4:48 AM   Result Value Ref Range    MRSA PCR Screen Not Detected Not Detected   EKG 12-lead    Collection Time: 02/27/25  6:08 AM   Result Value Ref Range    QRS Duration 82 ms    [facility] QTC Calculation 504 ms   Lactic acid, plasma    Collection Time: 02/27/25  8:55 AM   Result Value Ref Range     Lactic Acid Level 2.6 (H) 0.5 - 2.2 mmol/L   CBC with Differential    Collection Time: 02/27/25  8:55 AM   Result Value Ref Range    WBC 7.29 4.50 - 11.50 x10(3)/mcL    RBC 3.19 (L) 4.70 - 6.10 x10(6)/mcL    Hgb 8.7 (L) 14.0 - 18.0 g/dL    Hct 28.1 (L) 42.0 - 52.0 %    MCV 88.1 80.0 - 94.0 fL    MCH 27.3 27.0 - 31.0 pg    MCHC 31.0 (L) 33.0 - 36.0 g/dL    RDW 18.5 (H) 11.5 - 17.0 %    Platelet 333 130 - 400 x10(3)/mcL    MPV 10.0 7.4 - 10.4 fL    Neut % 67.5 %    Lymph % 26.2 %    Mono % 4.8 %    Eos % 0.5 %    Basophil % 0.5 %    Imm Grans % 0.5 %    Neut # 4.91 2.1 - 9.2 x10(3)/mcL    Lymph # 1.91 0.6 - 4.6 x10(3)/mcL    Mono # 0.35 0.1 - 1.3 x10(3)/mcL    Eos # 0.04 0 - 0.9 x10(3)/mcL    Baso # 0.04 <=0.2 x10(3)/mcL    Imm Gran # 0.04 0.00 - 0.04 x10(3)/mcL    NRBC% 0.3 %       CT Chest Abdomen Pelvis With IV Contrast (XPD) NO Oral Contrast  Result Date: 2/27/2025  START OF REPORT: Technique: CT Scan of the chest abdomen and pelvis was performed with intravenous contrast with axial as well as sagittal and, coronal images. Dosage Information: Automated Exposure Control was utilized 816.38 mGy.cm. Comparison: Comparison is with study dated2024-11-12 15:13:31. Clinical History: Hematemesis. Findings: Soft Tissues: Unremarkable. Lines and Tubes: A tracheostomy tube is in place. Neck: The visualized soft tissues of the neck appear unremarkable. Mediastinum: The mediastinal structures are within normal limits. Heart: The heart size is within normal limits. Mild coronary artery calcification is seen. Cardiac pacer leads are seen in the right atrium and right ventricle. Aorta: Mild aortic calcification is seen in the thoracic aorta. Pulmonary Arteries: Unremarkable. Lungs: There are faint ground glass opacities seen in both lungs that are predominantly appreciated in both lower lobes. These findings are significantly decreased in amount compared to the previous study and likely represent recurrent mild pneumonia with a  possibly atypical component. There are some subsegmental atelectases versus fibroses in both lower lobes. Pleura: No effusions or pneumothorax are identified. Bony Structures: Spine: Mild spondylolytic changes are seen in the thoracic spine. Liver: The liver appears unremarkable. Biliary System: No intrahepatic or extrahepatic biliary duct dilatation is seen. Gallbladder: The gallbladder appears unremarkable. Pancreas: The pancreas appears unremarkable. Spleen: The spleen appears unremarkable. Trauma: No specific evidence of splenic trauma is seen. Adrenals: The adrenal glands appear unremarkable. Kidneys: A single cyst measuring 2.0 cm is seen on Image 47, Series 14 upper pole of the left kidney. A single cyst measuring 4.0 cm is seen on Image 58, Series 14 lower pole of the right kidney. These findings are stable since the previous study. The bilateral kidneys are otherwise unremarkable with no stones or hydronephrosis. Aorta: There is mild calcification of the abdominal aorta and its branches. Bowel: Esophagus: The visualized esophagus appears unremarkable. Stomach: There is a PEG tube seen with its tip within the duodenum. Duodenum: Otherwise unremarkable appearing duodenum. Small Bowel: There are some metallic densities seen involving possibly the distal ileum. These may possibly represent post operative changes. The small bowels are otherwise unremarkable. Colon: There is moderate stool in the transverse descending and sigmoid colon which could reflect an element of constipation. Correlate with clinical and laboratory findings. Appendix: The appendix appears unremarkable and is partially seen on Image 170, Series 6. Peritoneum: No intraperitoneal free air or ascites is seen. Pelvis: Bladder: The bladder is nondistended however the bladder wall appears markedly thickened after considering state of nondistension which could reflect an element of cystitis. A underwood catheter is in place. Male: Prostate gland: There  are a few calcifications prostate gland. Bony structures: Dorsal Spine: There is moderate multilevel spondylosis and multilevel disc disease of the visualized dorsal spine. Bony Pelvis: There is moderate degenerative change of the bilateral hip. There are some cortical irregularities in the inferior aspect of the left acetabulum (series 14, image 54). There are abundant irregular ossified structures in the soft tissues of the left hip. Impression: 1. The bladder is nondistended however the bladder wall appears markedly thickened after considering state of nondistension which could reflect an element of cystitis. Correlate with clinical and laboratory findings. 2. There are faint ground glass opacities seen in both lungs that are predominantly appreciated in both lower lobes. These findings are significantly decreased in amount compared to the previous study and likely represent recurrent mild pneumonia with a possibly atypical component. Correlate with clinical and laboratory findings regards additional evaluation. 3. No acute intraabdominal or pelvic solid organ or bowel pathology identified. Details and other findings as discussed above.     X-Ray Chest AP Portable  Result Date: 2/26/2025  EXAMINATION XR CHEST AP PORTABLE CLINICAL HISTORY Anemia, unspecified TECHNIQUE A total of 1 frontal image(s) submitted of the chest. COMPARISON 6 January 2025 FINDINGS Lines/tubes/devices: Right chest wall pacemaker/ICD is unchanged.  Tracheostomy tube remains in similar position.  Multiple ECG leads overlie the chest. The cardiac silhouette and central vascular structures are unchanged.  The trachea is midline. Evaluation of the left mid and lower lung field is limited secondary to superimposed artifact from the patient's hand overlying the chest.  Within limitations, no convincing new or worsened airspace consolidation is identified.  There is no large pleural effusion or convincing pneumothorax. Regional osseous structures  and extrathoracic soft tissues are similar. IMPRESSION No acute process or other adverse interval change identified within technical limitations discussed above. Electronically signed by: Isaac Carcamo Date:    02/26/2025 Time:    21:52    X-Ray Abdomen AP 1 View  Result Date: 2/26/2025  EXAMINATION: XR ABDOMEN AP 1 VIEW CLINICAL HISTORY: Gastrostomy status TECHNIQUE: AP X-RAY OF THE ABDOMEN: CPT 77491 FINDINGS: Examination reveals gaseous distension of the abdomen with gas in loops of large and small bowel some residual feces identified throughout the colon the gas pattern is nonspecific with no clear evidence of ileus or obstruction no abnormal masses identified there are rotatory scoliotic changes of the lumbar spine with significant chronic changes related to the left hip with significant heterotrophic bone formation     Gaseous distension of the abdomen with no other significant abnormalities identified. Changes in the left hip Electronically signed by: Vik Keen Date:    02/26/2025 Time:    11:56      Assessment / Plan:     69 y.o. AA male known to Dr. 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  Patient with multiple prior admissions during which our group was consulted for similar complaints of coffee-ground emesis and tube feed intolerance.  Also has had prior aspiration pneumonia.  s/p PEG exchange for G-J tube extension 10/2024.  Here with anemia on outpatient labs.     Acute on chronic anemia   Hgb 7.1--2u prbcs -- 8.7   Baseline hgb around 8 as of late.  FOBT negative x1 and positive x2 1/2025.  No overt GIB.   EGD 10/2024 with no evidence for anemia.   Colon in 2018 ok.    Likely component of AOCD in light of chronic osteo.    2.   Vomiting   Tube appears in appropriate position.  No acute findings on CT in the GI tract to explain.      3.  H/O neuroendocrine SB tumor with resection     -Continue ppi iv 40 mg bid.  -Monitor H/H and transfuse as needed to Hgb 7  -Monitor stools for bleeding  -Will discuss potential endoscopy with MD Valencia for trickle TFs today and monitor residuals.  Hold if >100 or if any vomiting.   -check chromogranin A and 24 hour urine THIAA      Thank you for allowing us to participate in this patient's care.         [1]   Current Facility-Administered Medications   Medication Dose Route Frequency Provider Last Rate Last Admin    0.9%  NaCl infusion (for blood administration)   Intravenous Q24H PRN Amina Dolan MD        albuterol-ipratropium 2.5 mg-0.5 mg/3 mL nebulizer solution 3 mL  3 mL Nebulization Q6H PRN Amina Dolan MD        amiodarone tablet 200 mg  200 mg Per G Tube Daily Amina Dolan MD   200 mg at 02/27/25 0752    atorvastatin tablet 10 mg  10 mg Per G Tube Daily Amina Dolan MD   10 mg at 02/27/25 0751    busPIRone tablet 5 mg  5 mg Per G Tube TID Amina Dolan MD   5 mg at 02/27/25 0751    cholestyramine-aspartame 4 gram packet 4 grams of anhydrous cholestyramine  1 packet Per G Tube BID Amina Dolan MD   4 grams of anhydrous cholestyramine at 02/27/25 0751    finasteride tablet 5 mg  5 mg Per G Tube Daily Amina Dolan MD   5 mg at 02/27/25 0751    metoprolol injection 5 mg  5 mg Intravenous Once Efrain Salcido MD        metoprolol tartrate (LOPRESSOR) tablet 25 mg  25 mg Per G Tube BID Amina Dolan MD   25 mg at 02/27/25 0751    mupirocin 2 % ointment   Nasal BID Efrain Salcido MD   Given at 02/27/25 0751    pantoprazole injection 40 mg  40 mg Intravenous BID Amina Dolan MD   40 mg at 02/27/25 0751    QUEtiapine tablet 25 mg  25 mg Per G Tube BID Amina Dolan MD   25 mg at 02/27/25 0751    sodium chloride 0.9% flush 10 mL  10 mL Intravenous PRN Amina Dolan MD        sucralfate tablet 1  g  1 g Per G Tube QID (AC & HS) Amina Dolan MD        venlafaxine tablet 75 mg  75 mg Per G Tube BID Amina Dolan MD   75 mg at 02/27/25 0752   [2]   Medications Prior to Admission   Medication Sig Dispense Refill Last Dose/Taking    amiodarone (PACERONE) 200 MG Tab 1 tablet (200 mg total) by Per G Tube route once daily.       apixaban (ELIQUIS) 5 mg Tab Take 1 tablet (5 mg total) by mouth 2 (two) times daily. 60 tablet 11     ascorbic Acid (VITAMIN C) 500 mg CpSR 500 mg 2 (two) times daily. Per PEG tube       busPIRone (BUSPAR) 5 MG Tab 5 mg by Per G Tube route 3 (three) times daily.       cholestyramine (QUESTRAN) 4 gram packet 4 g once daily. Via PEG tube       cholestyramine-aspartame (QUESTRAN LIGHT) 4 gram PwPk 1 packet (4 g total) by Per G Tube route 2 (two) times daily. 180 packet 3     finasteride (PROSCAR) 5 mg tablet 1 tablet (5 mg total) by Per G Tube route once daily.       furosemide (LASIX) 80 MG tablet 80 mg by Per G Tube route as needed (for Edema).       L. acidophilus/L.bulgaricus (FLORANEX ORAL) 1 packet by PEG Tube route Daily.       levetiracetam 500 mg/5 mL (5 mL) Soln 1,500 mg by Per G Tube route 2 (two) times daily. Nursing home reports medication hold by MD until level redraw on Monday.       LIPITOR 10 mg tablet 10 mg by Per G Tube route once daily.       metoprolol tartrate (LOPRESSOR) 25 MG tablet 1 tablet (25 mg total) by Per G Tube route 2 (two) times daily.       miconazole NITRATE 2 % (MICOTIN) 2 % top powder Apply topically 2 (two) times daily.       multivitamin (THERAGRAN) per tablet 1 tablet by Per G Tube route once daily.       polyethylene glycol (GLYCOLAX) 17 gram PwPk 17 g by Per G Tube route 2 (two) times daily as needed for Constipation.       protein supplement (PROMOD PROTEIN ORAL) 30 mLs by Per G Tube route once daily.       QUEtiapine (SEROQUEL) 25 MG Tab 25 mg by Per G Tube route 2 (two) times daily.       scopolamine (TRANSDERM-SCOP) 1.3-1.5 mg (1 mg over 3 days)  Place 1 patch onto the skin Every 3 (three) days.       sucralfate (CARAFATE) 1 gram tablet 1 tablet (1 g total) by Per G Tube route 4 (four) times daily before meals and nightly.       venlafaxine 75 mg TR24 1 tablet by Per G Tube route 2 (two) times a day.

## 2025-02-27 NOTE — PLAN OF CARE
[facility] sent updates to [employer] via [facility]-established facility

## 2025-02-27 NOTE — PLAN OF CARE
02/27/25 1038   Discharge Assessment   Assessment Type Discharge Planning Assessment   Confirmed/corrected address, phone number and insurance Yes   Confirmed Demographics Correct on Facesheet   Source of Information family   Communicated EDER with patient/caregiver Date not available/Unable to determine   Reason For Admission Anemia requiring transfusions   People in Home facility resident   Facility Arrived From: [facility]   Do you expect to return to your current living situation? Yes   Do you have help at home or someone to help you manage your care at home? Yes   Who are your caregiver(s) and their phone number(s)? facility staff   Prior to hospitilization cognitive status: Unable to Assess   Current cognitive status: Unable to Assess   Walking or Climbing Stairs Difficulty yes   Walking or Climbing Stairs transferring difficulty, dependent   Mobility Management facility staff danny lift   Dressing/Bathing Difficulty yes   Dressing/Bathing dressing difficulty, dependent;bathing difficulty, dependent   Dressing/Bathing Management facility staff   Home Accessibility wheelchair accessible   Equipment Currently Used at Home lift device;respiratory supplies;ventilator;suction machine;oxygen;hospital bed   Patient currently being followed by outpatient case management? No   Do you currently have service(s) that help you manage your care at home? No   Do you take prescription medications? Yes   Do you have prescription coverage? Yes   Coverage medicaid   Do you have any problems affording any of your prescribed medications? No   Is the patient taking medications as prescribed? yes   Who is going to help you get home at discharge? ambulance with vent   Are you on dialysis? No   Do you take coumadin? No   Discharge Plan A Return to nursing home   Discharge Plan B Return to Nursing Home   DME Needed Upon Discharge  none   Discharge Plan discussed with: Adult children   Transition of Care Barriers None   OTHER    Name(s) of People in Home resident at [facility]     Patient is resident at [facility] and vent dependent.  Daughter Betsey states family is in agreement with sending him back to [place]

## 2025-02-27 NOTE — CONSULTS
[facility] - 7th Floor ICU  Wound Care  Consult Note    Patient Name: Delio Daniel Jr.  MRN: [id number]  Admission Date: 2/26/2025  Hospital Length of Stay: 0 days  Attending Physician: Efrain Salcido MD  Primary Care Provider: No primary care provider on file.     Inpatient consult to Wound Care Physician  Consult performed by: Liz Dewitt FNP  Consult ordered by: Efrain Salcido MD        Subjective:     History of Present Illness:  Wound medicine consult    The patient is a 69 year old BM who presented to [facility] ED on 2/26/25 from [facility] due to low hemoglobin of 6 on lab work done that day. Repeat ED labs significant for H/H 7.1/24.6; he has received 2u PRBC and improved to 8.7/28.1 today. Records reveals history of coffee ground emesis and recent + FOBT in January 2025.  GI was consulted and the patient admitted to ICU for management due to chronic vent dependence via trach.   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 Patient is non-verbal at baseline, contracted upper and lower extremities on the left side. 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 Recent admission at [facility] in November 2024 for multidrug resistant UTI and volume depletion. Hew has a history of aspiration pneumonia as well as bacteremia. He received multiple IV antibiotics during prior admissions guided by ID.  He was discharged on ertapenem with planned end date of 1/14/25. He returns with PICC line; unsure of what he may have been receiving prior to admission if this line was still in use.     Patient admitted with large stage IV pressure ulcer of the sacrum; he was seen by us during prior encounter when this wound was covered with devitalized and necrotic tissue with scant bone  "exposed, it has since evolved as expected with exposed muscle/bone. He was seen by inpatient wound nurse today and wound and offloading orders put in place. Wound medicine consulted as well for evaluation of suspected osteomyelitis.   CT of chest/abd/pelv done on 2/27/25 shows "interval worsening of a sacral decubitus ulcer when compared to 11/12/24. Portions of the superior coccyx are no longer visible, concerning for osteomyelitis. Gas containing fluid collection noted posterior to the sacrum measuring 2.4 cm x 1.5 cm, cannot exclude an abscess"    2/27/25 - initial evaluation for this encounter done today. Met patient in ICU bed 702, accompanied by WOCN nurse and discussed patient care with nurse caring for patient today. He is lying on left side with use of purple wedge. He has eyes open and makes eye contact when I call his name. He appears comfortable without signs of apparent distress. Tolerates wound evaluation and turning/repositioning well.     Scheduled Meds:   amiodarone  200 mg Per G Tube Daily    atorvastatin  10 mg Per G Tube Daily    busPIRone  5 mg Per G Tube TID    cholestyramine-aspartame  1 packet Per G Tube BID    finasteride  5 mg Per G Tube Daily    metoprolol  5 mg Intravenous Once    metoprolol tartrate  25 mg Per G Tube BID    mupirocin   Nasal BID    pantoprazole  40 mg Intravenous BID    QUEtiapine  25 mg Per G Tube BID    sucralfate  1 g Per G Tube QID (AC & HS)    venlafaxine  75 mg Per G Tube BID     Continuous Infusions:   0.9% NaCl   Intravenous Continuous 100 mL/hr at 02/27/25 1117 New Bag at 02/27/25 1117     PRN Meds:  Current Facility-Administered Medications:     0.9%  NaCl infusion (for blood administration), , Intravenous, Q24H PRN    albuterol-ipratropium, 3 mL, Nebulization, Q6H PRN    sodium chloride 0.9%, 10 mL, Intravenous, PRN    Review of patient's allergies indicates:  No Known Allergies     Past Medical History:   Diagnosis Date    Arthritis     Atrial fibrillation  "    BPH (benign prostatic hyperplasia)     Cardiac arrest     Coronary artery disease     Cyst, kidney, acquired     Diverticulosis     Hyperlipidemia     Hypertension     MI (myocardial infarction)     Obesity     Steatosis of liver     Stroke      Past Surgical History:   Procedure Laterality Date    A-V CARDIAC PACEMAKER INSERTION Right     CARDIAC CATHETERIZATION      COLONOSCOPY W/ BIOPSIES      CRANIECTOMY Right 12/20/2023    Procedure: CRANIECTOMY;  Surgeon: Artem Can MD;  Location: [facility];  Service: Neurosurgery;  Laterality: Right;    ESOPHAGOGASTRODUODENOSCOPY W/ PEG N/A 1/2/2024    Procedure: PEG;  Surgeon: Tani Day MD;  Location: [facility] ENDOSCOPY;  Service: Gastroenterology;  Laterality: N/A;    ESOPHAGOGASTRODUODENOSCOPY W/ PEG N/A 10/30/2024    Procedure: EGD w/ Jtube placement;  Surgeon: Gabriele Salcido MD;  Location: [facility] ENDOSCOPY;  Service: Endoscopy;  Laterality: N/A;  with J tube extension    excision of colon      TRACHEOSTOMY N/A 12/29/2023    Procedure: CREATION, TRACHEOSTOMY;  Surgeon: Patricia Winslow MD;  Location: [facility];  Service: ENT;  Laterality: N/A;  REQ 1130 //  NEEDS 2 SCRUBS       Family History       Problem Relation (Age of Onset)    Hypertension Mother, Father, Sister          Tobacco Use    Smoking status: Never    Smokeless tobacco: Never   Substance and Sexual Activity    Alcohol use: Not Currently    Drug use: Not Currently    Sexual activity: Not Currently     Partners: Female     Review of Systems   Unable to perform ROS: Patient nonverbal       Objective:     Vital Signs (Most Recent):  Temp: 98.5 °F (36.9 °C) (02/27/25 0750)  Pulse: 96 (02/27/25 1210)  Resp: (!) 0 (02/27/25 1210)  BP: (!) 170/89 (02/27/25 1210)  SpO2: 100 % (02/27/25 1210) Vital Signs (24h Range):  Temp:  [97.8 °F (36.6 °C)-98.5 °F (36.9 °C)] 98.5 °F (36.9 °C)  Pulse:  [] 96  Resp:  [0-24] 0  SpO2:  [93 %-100 %] 100 %  BP: ()/() 170/89     Weight: 68 kg (150  lb)  Body mass index is 23.49 kg/m².     Physical Exam  Vitals reviewed.   Constitutional:       General: He is awake.      Comments: He is awake with eyes wide open and tracks when name is called.          HENT:      Head:      Comments: Right bone flap      Neck:      Comments: Tracheostomy   Pulmonary:      Comments: Mechanical ventilation    Abdominal:      Comments: PEG/J-Tube   Skin:     General: Skin is warm and dry.      Capillary Refill: Capillary refill takes less than 2 seconds.      Findings: Wound present.             Comments:        Neurological:      Comments: Left upper and lower extremity contracted  Moves RUE       Sacrum: 10 x 13 x 3 cm with undermining from 1 - 5 o'clock with depth of 6 cm and undermining from 7 - 11 o'clock depth of 4 cm       Sacral ulcer - right lateral undermining/pouch       Left lateral knee: 0.8 x 0.4 x 0.1 cm       Left lateral foot: proximal area of dry peeling skin, no open wound today. Distal wound irregular shape with red wound bed: 2.5 x 2 cm       Left medial foot/ankle: 1 x 1 cm                  Laboratory:    BMP:   Recent Labs   Lab 02/26/25 2107 02/27/25  0855    140   K 3.2* 3.7   * 109*   CO2 24 24   BUN 20.2 16.6   CREATININE 0.63* 0.66*   CALCIUM 8.8 9.5   MG 2.30  --        CBC:   Recent Labs   Lab 02/27/25  0855   WBC 7.29   RBC 3.19*   HGB 8.7*   HCT 28.1*      MCV 88.1   MCH 27.3   MCHC 31.0*     CMP:   Recent Labs   Lab 02/26/25 2107 02/27/25  0855   CALCIUM 8.8 9.5   ALBUMIN 2.5*  --     140   K 3.2* 3.7   CO2 24 24   * 109*   BUN 20.2 16.6   CREATININE 0.63* 0.66*   ALKPHOS 86  --    ALT 8  --    AST 16  --    BILITOT 1.2  --      Coagulation:   Recent Labs   Lab 02/26/25 2107   INR 1.4*   APTT 35.1*       ESR:   Recent Labs   Lab 02/21/25  0516   SEDRATE 70*     LFTs:   Recent Labs   Lab 02/26/25 2107   ALT 8   AST 16   ALKPHOS 86   BILITOT 1.2   ALBUMIN 2.5*       Microbiology Results (last 7 days)       Procedure  Component Value Units Date/Time    Blood Culture #1 **CANNOT BE ORDERED STAT** [[id number]] Collected: 02/27/25 1318    Order Status: Resulted Specimen: Blood Updated: 02/27/25 1331    Blood Culture #1 **CANNOT BE ORDERED STAT** [[id number]] Collected: 02/27/25 1327    Order Status: Resulted Specimen: Blood Updated: 02/27/25 1331    Respiratory Culture [[id number]] Collected: 02/27/25 0446    Order Status: Completed Specimen: Sputum Updated: 02/27/25 0724     GRAM STAIN Quality 1+      Many Gram Positive Rods      Many Gram Negative Rods    Gram Stain [[id number]] Collected: 02/27/25 0446    Order Status: Completed Specimen: Sputum Updated: 02/27/25 0724     GRAM STAIN Quality 1+      Many Gram Positive Rods      Many Gram Negative Rods    Urine culture [[id number]] Collected: 02/27/25 0057    Order Status: Sent Specimen: Urine Updated: 02/27/25 0111              Recent Labs   Lab 02/27/25 0445   COLORU Yellow   PHUR 7.0   PROTEINUA 1+*   BACTERIA None Seen   NITRITE Negative   LEUKOCYTESUR 75*   UROBILINOGEN 8.0*       Diagnostic Results:  I have reviewed all pertinent imaging results/findings within the past 24 hours.    CT Chest Abdomen Pelvis With IV Contrast (XPD) NO Oral Contrast  Status: Final result     Fracturet Results Release    [employer] Status: Active  Results Release     PACS Images for [employer]     Show images for CT Chest Abdomen Pelvis With IV Contrast (XPD) NO Oral Contrast  CT Chest Abdomen Pelvis With IV Contrast (XPD) NO Oral Contrast  Order: [id number]   Status: Final result       Next appt: 08/21/2025 at 07:15 AM in Radiology ([facility]-CT2 500 LB LIMIT)    Test Result Released: Yes (not seen)    0 Result Notes  Details    Reading Physician Reading Date Result Priority   Mars Lyman Jr., MD  [phone]  2/27/2025 Anjel Phan MD  [phone]  2/27/2025      Narrative & Impression  EXAMINATION:  CT CHEST ABDOMEN PELVIS WITH IV CONTRAST (XPD)     CLINICAL  HISTORY:  Hematemesis;     TECHNIQUE:  Low dose axial images, sagittal and coronal reformations were obtained from the thoracic inlet through the pelvis following the IV administration of 100 mL of Omnipaque 350. Dose reduction techniques including automatic exposure control (AEC) were utilized.     Dose (DLP): 816 mGycm     COMPARISON:  Chest radiograph and abdominal radiograph from the same day.  CT chest, abdomen, and pelvis with IV contrast from 11/12/2024.     FINDINGS:  Tracheostomy tube in appropriate position.  Linear low-density within the tracheal lumen along the posterior margin of the tracheostomy likely represents fluid secretion.  Small volume fluid secretions also noted within the left mainstem bronchus on series 6, image 47.  Tracheobronchial tree is patent.  Patchy ground-glass opacities are noted within the right middle lobe and right lower lobe.  Subsegmental atelectasis or scarring at the left lung base.  No pleural effusion or pneumothorax.     Chronic enlargement of the left atrium.  No pericardial effusion.  Right chest wall AICD in place.     Mild calcific atherosclerosis of the thoracic and abdominal aorta.  No evidence of aortic aneurysm or dissection.     No evidence of pulmonary artery embolism.     No evidence of mediastinal or hilar lymphadenopathy.     No free intraperitoneal fluid or gas.     Liver is normal in size and attenuation with no evidence of focal lesion.  No intrahepatic or extrahepatic biliary ductal dilatation.     Normal appearance of the pancreas.     Normal appearance of the adrenal glands.     Normal appearance of the spleen.     Both kidneys contain simple cysts.  No nephrolithiasis or hydronephrosis.  The ureters are normal.  There is a Fernandez catheter within the bladder lumen.  There is marked circumferential thickening of the bladder wall which is nonspecific and could be secondary to either Fernandez catheter decompression and/or cystitis.  Curvilinear calcifications  noted within the prostate gland.     Esophagus is normal.  Percutaneous gastrostomy tube with balloon tip within the gastric lumen.  The tip of the catheter terminates in the transverse portion of the duodenum.  No evidence of bowel obstruction.  Postsurgical sutures are noted in loops of small bowel in the right lower quadrant.  Appendix is normal.  Stool is noted throughout the colon with no evidence of acute colonic abnormality.     No evidence of mesenteric, pelvic, or inguinal lymphadenopathy.     Interval worsening of a sacral decubitus ulcer when compared to 11/12/2024.  Portions of the superior coccyx are no longer visible, concerning for osteomyelitis.  Gas containing fluid collection noted posterior to the sacrum measuring 2.4 cm x 1.5 cm, cannot exclude an abscess (series 6, image 172).  Worsening increased density within the presacral space.     Unchanged exuberant heterotopic ossification noted adjacent to the left iliac wing and anterolateral to the left hip joint.     Bilateral gynecomastia.     Impression:     Interval worsening of a sacral decubitus ulcer with findings suspicious for osteomyelitis of the coccyx.  There is also suggestion of a gas containing fluid collection in the subcutaneous fat superficial to the sacrum measuring 2.4 cm x 1.5 cm, concerning for abscess formation.  Findings could be better evaluated with MRI sacrum/coccyx without and with IV contrast.     Patchy ground-glass opacities in the right middle and lower lobes which are nonspecific and could represent either edema or atypical pneumonia.     Subsegmental atelectasis of the left lung base.     Marked circumferential thickening of the bladder wall which is decompressed by Fernandez catheter.  Appearance is nonspecific and could be secondary to Fernandez catheter decompression and/or superimposed cystitis.  Correlate with urinalysis.     Discrepancy between preliminary and final reports was reported to Dr. Salcido at 09:36 on  02/27/2025.        Electronically signed by:Anjel Lambert  Date:                                            02/27/2025  Time:                                           09:38               Physical Exam  Assessment/Plan:     Stage 4 pressure ulcer of sacral region - present on admission - exposed muscle and friable bone with osteomyelitis.   Stage 3 pressure ulcer of left knee - present on admission  Stage 3 pressure ulcer of left lateral foot - present on admission  CVA with residual cognitive/motor deficits; contractures   VRE bacteremia  Klebsiella bacteremia  Tracheostomy and PEG tube dependent           PLAN:     Chart reviewed, patient examined and wounds assessed.  Opinion: patient is a functional quadriplegic, bed bound and dependent on total care including needed vent for respiratory support. He has several pressure ulcers but most notable is large stage 4 pressure ulcer to his sacral region that has exposed friable bone with large flap of skin that covers nearly half of the wound. The wound bed however does not look terrible there is some healthy red tissue and about 50% soft yellow slough, no purulent drainage or odor. He does have exposed bone that is loose in some places. Can consider getting a sample for culture.   He has been treated with multiple antibiotics since November for respiratory infection and sepsis that was guided by ID. He did have exposed bone during prior admission but was not treated for osteo. Prognosis for wound healing is very poor even with treatment of osteomyelitis and he is at risk for ongoing and repeated infection due to proximity to anus with ongoing wound contamination.   Consider ID consult.   He continues to have ongoing loose stools that contaminate the wound. During previous encounter he was not a candidate for ostomy.   During previous admissions family's wishes were for continued aggressive supportive measures with full code status. Ongoing palliative conversations may  be beneficial.  Will need to discuss with family goals of care.   Left knee and ankle/foot wounds without signs of infection.   Wound care orders: cleanse with Vashe, apply Vashe moistened Kerlix to wound bed, cover with ABD pad and secure with cloth tape, BID and PRN.   Monitor for signs & symptoms of deterioration  Offloading of sacrum/buttocks/heels at all times: YOLETTE mattress, turning q 2 hrs; use of wedges and heel offloading devices to be used at all times while in bed; ( CHATA Zavala). This needs to be reinforced by every staff nurse caring for patient on every shift of every day.  Incontinence: control moisture/wound contamination: No briefs; use things such as purewick or underwood catheter; rectal tube  Nutrition: Recommend aggressive nutritional support, protein supplementation along with vitamin and mineral supplements  and shaji to support wound healing; Follow RD recommendations for tube feeds  Will try to follow weekly while admitted, but every nurse assigned to patient on every shift of every day needs to address daily wound care dressing changes and offloading modalities including using heel offloading devices, wedges, YOLETTE mattress etc  Discussed with patient as well as nurse caring for patient today           The time spent including preparing to see the patient, obtaining patient history and assessment, evaluation of the plan of care, patient/caregiver counseling and education, orders, documentation, coordination of care, and other professional medical management activities for today's encounter was  85   minutes       Thank you for your consult. I will follow-up with patient. Please contact us if you have any additional questions.    TRUMAN Romeo  Wound Care  [facility] - 7th Floor ICU

## 2025-02-27 NOTE — ED PROVIDER NOTES
Encounter Date: 2/26/2025    SCRIBE #1 NOTE: I, Patricia Lyons, am scribing for, and in the presence of,  Itz Camara MD. I have scribed the following portions of the note - Other sections scribed: HPI, ROS, PE.       History     Chief Complaint   Patient presents with    Abnormal Lab     [facility] trach pt on home vent reports low H&H from nursing home      Patient is a 69-year-old male with a history of BPH, A-fib, CAD, HLD, HTN, previous MI, and multiple previous CVAs presenting to the ED from [facility] via EMS for low H&H. Per nursing home, the pt had lab work drawn today that showed a hemoglobin of 6. The pt is nonverbal as well as trach and PEG tube dependent at baseline. Review of symptoms is unobtainable secondary to the pt's baseline mental status.    The history is provided by the EMS personnel and the nursing home. No  was used.     Review of patient's allergies indicates:  No Known Allergies  Past Medical History:   Diagnosis Date    Arthritis     Atrial fibrillation     BPH (benign prostatic hyperplasia)     Cardiac arrest     Coronary artery disease     Cyst, kidney, acquired     Diverticulosis     Hyperlipidemia     Hypertension     MI (myocardial infarction)     Obesity     Steatosis of liver     Stroke      Past Surgical History:   Procedure Laterality Date    A-V CARDIAC PACEMAKER INSERTION Right     CARDIAC CATHETERIZATION      COLONOSCOPY W/ BIOPSIES      CRANIECTOMY Right 12/20/2023    Procedure: CRANIECTOMY;  Surgeon: Artem Can MD;  Location: [facility] OR;  Service: Neurosurgery;  Laterality: Right;    ESOPHAGOGASTRODUODENOSCOPY W/ PEG N/A 1/2/2024    Procedure: PEG;  Surgeon: Tani Day MD;  Location: [facility] ENDOSCOPY;  Service: Gastroenterology;  Laterality: N/A;    ESOPHAGOGASTRODUODENOSCOPY W/ PEG N/A 10/30/2024    Procedure: EGD w/ Jtube placement;  Surgeon: Gabriele Salcido MD;  Location: [facility]  ENDOSCOPY;  Service: Endoscopy;  Laterality: N/A;  with J tube extension    excision of colon      TRACHEOSTOMY N/A 12/29/2023    Procedure: CREATION, TRACHEOSTOMY;  Surgeon: Patricia Winslow MD;  Location: [facility] OR;  Service: ENT;  Laterality: N/A;  REQ 1130 //  NEEDS 2 SCRUBS     Family History   Problem Relation Name Age of Onset    Hypertension Mother      Hypertension Father      Hypertension Sister       Social History[1]  Review of Systems   Unable to perform ROS: Patient nonverbal       Physical Exam     Initial Vitals [02/26/25 2027]   BP Pulse Resp Temp SpO2   111/77 107 20 97.9 °F (36.6 °C) 100 %      MAP       --         Physical Exam    Nursing note and vitals reviewed.  Constitutional:   The pt is elderly and frail-appearing. There are no external signs of trauma.  The pt is trach and PEG tube dependent at baseline.   Eyes: EOM are normal. Pupils are equal, round, and reactive to light.   Cardiovascular:  Normal rate, regular rhythm and intact distal pulses.           No murmur heard.  Pulmonary/Chest: Breath sounds normal. No respiratory distress. He has no wheezes. He has no rales.   Abdominal: Abdomen is soft. He exhibits no distension.   Genitourinary:    Genitourinary Comments: See clinical images of pt's sacral wounds in chart.       Neurological:   The pt opens his eyes to voice. The pt is nonverbal and contracted at baseline.  GCS 5T.   Skin: Skin is warm. Capillary refill takes less than 2 seconds. No rash noted.         ED Course   Critical Care    Date/Time: 2/26/2025 9:45 PM    Performed by: Itz Camara MD  Authorized by: Itz Camara MD  Direct patient critical care time: 35 minutes  Total critical care time (exclusive of procedural time) : 35 minutes  Critical care time was exclusive of separately billable procedures and treating other patients.  Critical care was necessary to treat or prevent imminent or life-threatening deterioration of the following conditions:  circulatory failure.  Critical care was time spent personally by me on the following activities: development of treatment plan with patient or surrogate, discussions with consultants, examination of patient, evaluation of patient's response to treatment, ordering and performing treatments and interventions, obtaining history from patient or surrogate, ordering and review of laboratory studies, ordering and review of radiographic studies, pulse oximetry and re-evaluation of patient's condition.        Labs Reviewed   COMPREHENSIVE METABOLIC PANEL - Abnormal       Result Value    Sodium 143      Potassium 3.2 (*)     Chloride 108 (*)     CO2 24      Glucose 90      Blood Urea Nitrogen 20.2      Creatinine 0.63 (*)     Calcium 8.8      Protein Total 7.8 (*)     Albumin 2.5 (*)     Globulin 5.3 (*)     Albumin/Globulin Ratio 0.5 (*)     Bilirubin Total 1.2      ALP 86      ALT 8      AST 16      eGFR >60      Anion Gap 11.0      BUN/Creatinine Ratio 32     PROTIME-INR - Abnormal    PT 17.1 (*)     INR 1.4 (*)     Narrative:     Protimes are used to monitor anticoagulant agents such as warfarin. PT INR values are based on the current patient normal mean and the LIEN value for the specific instrument reagent used.  **Routine theraputic target values for the INR are 2.0-3.0**   APTT - Abnormal    PTT 35.1 (*)    B-TYPE NATRIURETIC PEPTIDE - Abnormal    Natriuretic Peptide 246.2 (*)    URINALYSIS, REFLEX TO URINE CULTURE - Abnormal    Color, UA Yellow      Appearance, UA Turbid (*)     Specific Gravity, UA 1.027      pH, UA 6.0      Protein, UA 1+ (*)     Glucose, UA Normal      Ketones, UA Negative      Blood, UA Negative      Bilirubin, UA Negative      Urobilinogen, UA 4.0 (*)     Nitrites, UA Negative      Leukocyte Esterase,  (*)     RBC, UA 0-5      WBC, UA >100 (*)     Bacteria, UA Rare      Budding Yeast, UA Many      Squamous Epithelial Cells, UA None Seen      Mucous, UA Trace (*)     Calcium Oxalate  Crystals, UA Few (*)    CBC WITH DIFFERENTIAL - Abnormal    WBC 6.47      RBC 2.67 (*)     Hgb 7.1 (*)     Hct 24.6 (*)     MCV 92.1      MCH 26.6 (*)     MCHC 28.9 (*)     RDW 21.4 (*)     Platelet 329      MPV 10.4      Neut % 70.4      Lymph % 21.2      Mono % 7.0      Eos % 0.6      Basophil % 0.5      Imm Grans % 0.3      Neut # 4.56      Lymph # 1.37      Mono # 0.45      Eos # 0.04      Baso # 0.03      Imm Gran # 0.02      NRBC% 0.0     TROPONIN I - Normal    Troponin-I <0.010     MAGNESIUM - Normal    Magnesium Level 2.30     CBC W/ AUTO DIFFERENTIAL    Narrative:     The following orders were created for panel order CBC auto differential.  Procedure                               Abnormality         Status                     ---------                               -----------         ------                     CBC with Differential[[id number]]       Abnormal            Final result                 Please view results for these tests on the individual orders.   TYPE & SCREEN    Group & Rh A POS      Indirect Kaila GEL NEG      Specimen Outdate 03/01/2025 23:59          ECG Results              EKG 12-lead (Final result)        Collection Time Result Time QRS Duration OHS QTC Calculation    02/27/25 06:08:09 02/27/25 08:12:22 82 504                     Final result by Interface, Lab In [facility] (02/27/25 08:12:28)                   Narrative:    Test Reason : R00.0,    Vent. Rate : 105 BPM     Atrial Rate :    BPM     P-R Int :    ms          QRS Dur :  82 ms      QT Int : 382 ms       P-R-T Axes :     82  67 degrees    QTcB Int : 504 ms    Atrial fibrillation with rapid ventricular response  Low voltage QRS  Abnormal ECG    Confirmed by Alma Rosa Ga ([id number]) on 2/27/2025 8:12:20 AM    Referred By:            Confirmed By: Alma Rosa Ga                                  Imaging Results              CT Chest Abdomen Pelvis With IV Contrast (XPD) NO Oral Contrast (Final result)  Result time 02/27/25  09:38:46      Final result by Anjel Lambert MD (02/27/25 09:38:46)                   Impression:      Interval worsening of a sacral decubitus ulcer with findings suspicious for osteomyelitis of the coccyx.  There is also suggestion of a gas containing fluid collection in the subcutaneous fat superficial to the sacrum measuring 2.4 cm x 1.5 cm, concerning for abscess formation.  Findings could be better evaluated with MRI sacrum/coccyx without and with IV contrast.    Patchy ground-glass opacities in the right middle and lower lobes which are nonspecific and could represent either edema or atypical pneumonia.    Subsegmental atelectasis of the left lung base.    Marked circumferential thickening of the bladder wall which is decompressed by Fernandez catheter.  Appearance is nonspecific and could be secondary to Fernandez catheter decompression and/or superimposed cystitis.  Correlate with urinalysis.    Discrepancy between preliminary and final reports was reported to Dr. Salcido at 09:36 on 02/27/2025.      Electronically signed by: Anjel Lambert  Date:    02/27/2025  Time:    09:38               Narrative:    EXAMINATION:  CT CHEST ABDOMEN PELVIS WITH IV CONTRAST (XPD)    CLINICAL HISTORY:  Hematemesis;    TECHNIQUE:  Low dose axial images, sagittal and coronal reformations were obtained from the thoracic inlet through the pelvis following the IV administration of 100 mL of Omnipaque 350. Dose reduction techniques including automatic exposure control (AEC) were utilized.    Dose (DLP): 816 mGycm    COMPARISON:  Chest radiograph and abdominal radiograph from the same day.  CT chest, abdomen, and pelvis with IV contrast from 11/12/2024.    FINDINGS:  Tracheostomy tube in appropriate position.  Linear low-density within the tracheal lumen along the posterior margin of the tracheostomy likely represents fluid secretion.  Small volume fluid secretions also noted within the left mainstem bronchus on series 6, image 47.   Tracheobronchial tree is patent.  Patchy ground-glass opacities are noted within the right middle lobe and right lower lobe.  Subsegmental atelectasis or scarring at the left lung base.  No pleural effusion or pneumothorax.    Chronic enlargement of the left atrium.  No pericardial effusion.  Right chest wall AICD in place.    Mild calcific atherosclerosis of the thoracic and abdominal aorta.  No evidence of aortic aneurysm or dissection.    No evidence of pulmonary artery embolism.    No evidence of mediastinal or hilar lymphadenopathy.    No free intraperitoneal fluid or gas.    Liver is normal in size and attenuation with no evidence of focal lesion.  No intrahepatic or extrahepatic biliary ductal dilatation.    Normal appearance of the pancreas.    Normal appearance of the adrenal glands.    Normal appearance of the spleen.    Both kidneys contain simple cysts.  No nephrolithiasis or hydronephrosis.  The ureters are normal.  There is a Fernandez catheter within the bladder lumen.  There is marked circumferential thickening of the bladder wall which is nonspecific and could be secondary to either Fernandez catheter decompression and/or cystitis.  Curvilinear calcifications noted within the prostate gland.    Esophagus is normal.  Percutaneous gastrostomy tube with balloon tip within the gastric lumen.  The tip of the catheter terminates in the transverse portion of the duodenum.  No evidence of bowel obstruction.  Postsurgical sutures are noted in loops of small bowel in the right lower quadrant.  Appendix is normal.  Stool is noted throughout the colon with no evidence of acute colonic abnormality.    No evidence of mesenteric, pelvic, or inguinal lymphadenopathy.    Interval worsening of a sacral decubitus ulcer when compared to 11/12/2024.  Portions of the superior coccyx are no longer visible, concerning for osteomyelitis.  Gas containing fluid collection noted posterior to the sacrum measuring 2.4 cm x 1.5 cm,  cannot exclude an abscess (series 6, image 172).  Worsening increased density within the presacral space.    Unchanged exuberant heterotopic ossification noted adjacent to the left iliac wing and anterolateral to the left hip joint.    Bilateral gynecomastia.                        Preliminary result by Mars Lyman Jr., MD (02/27/25 02:12:07)                   Impression:    1. The bladder is nondistended however the bladder wall appears markedly thickened after considering state of nondistension which could reflect an element of cystitis. Correlate with clinical and laboratory findings.  2. There are faint ground glass opacities seen in both lungs that are predominantly appreciated in both lower lobes. These findings are significantly decreased in amount compared to the previous study and likely represent recurrent mild pneumonia with a possibly atypical component. Correlate with clinical and laboratory findings regards additional evaluation.  3. No acute intraabdominal or pelvic solid organ or bowel pathology identified. Details and other findings as discussed above.               Narrative:    START OF REPORT:  Technique: CT Scan of the chest abdomen and pelvis was performed with intravenous contrast with axial as well as sagittal and, coronal images.    Dosage Information: Automated Exposure Control was utilized 816.38 mGy.cm.    Comparison: Comparison is with study [id number] 15:13:31.    Clinical History: Hematemesis.    Findings:  Soft Tissues: Unremarkable.  Lines and Tubes: A tracheostomy tube is in place.  Neck: The visualized soft tissues of the neck appear unremarkable.  Mediastinum: The mediastinal structures are within normal limits.  Heart: The heart size is within normal limits. Mild coronary artery calcification is seen. Cardiac pacer leads are seen in the right atrium and right ventricle.  Aorta: Mild aortic calcification is seen in the thoracic aorta.  Pulmonary Arteries:  Unremarkable.  Lungs: There are faint ground glass opacities seen in both lungs that are predominantly appreciated in both lower lobes. These findings are significantly decreased in amount compared to the previous study and likely represent recurrent mild pneumonia with a possibly atypical component. There are some subsegmental atelectases versus fibroses in both lower lobes.  Pleura: No effusions or pneumothorax are identified.  Bony Structures:  Spine: Mild spondylolytic changes are seen in the thoracic spine.  Liver: The liver appears unremarkable.  Biliary System: No intrahepatic or extrahepatic biliary duct dilatation is seen.  Gallbladder: The gallbladder appears unremarkable.  Pancreas: The pancreas appears unremarkable.  Spleen: The spleen appears unremarkable.  Trauma: No specific evidence of splenic trauma is seen.  Adrenals: The adrenal glands appear unremarkable.  Kidneys: A single cyst measuring 2.0 cm is seen on Image 47, Series 14 upper pole of the left kidney. A single cyst measuring 4.0 cm is seen on Image 58, Series 14 lower pole of the right kidney. These findings are stable since the previous study. The bilateral kidneys are otherwise unremarkable with no stones or hydronephrosis.  Aorta: There is mild calcification of the abdominal aorta and its branches.  Bowel:  Esophagus: The visualized esophagus appears unremarkable.  Stomach: There is a PEG tube seen with its tip within the duodenum.  Duodenum: Otherwise unremarkable appearing duodenum.  Small Bowel: There are some metallic densities seen involving possibly the distal ileum. These may possibly represent post operative changes. The small bowels are otherwise unremarkable.  Colon: There is moderate stool in the transverse descending and sigmoid colon which could reflect an element of constipation. Correlate with clinical and laboratory findings.  Appendix: The appendix appears unremarkable and is partially seen on Image 170, Series  6.  Peritoneum: No intraperitoneal free air or ascites is seen.    Pelvis:  Bladder: The bladder is nondistended however the bladder wall appears markedly thickened after considering state of nondistension which could reflect an element of cystitis. A underwood catheter is in place.  Male:  Prostate gland: There are a few calcifications prostate gland.    Bony structures:  Dorsal Spine: There is moderate multilevel spondylosis and multilevel disc disease of the visualized dorsal spine.  Bony Pelvis: There is moderate degenerative change of the bilateral hip. There are some cortical irregularities in the inferior aspect of the left acetabulum (series 14, image 54). There are abundant irregular ossified structures in the soft tissues of the left hip.                                         X-Ray Chest AP Portable (Final result)  Result time 02/26/25 21:52:46      Final result by Isaac Carcamo MD (02/26/25 21:52:46)                   Narrative:    EXAMINATION  XR CHEST AP PORTABLE    CLINICAL HISTORY  Anemia, unspecified    TECHNIQUE  A total of 1 frontal image(s) submitted of the chest.    COMPARISON  6 January 2025    FINDINGS  Lines/tubes/devices: Right chest wall pacemaker/ICD is unchanged.  Tracheostomy tube remains in similar position.  Multiple ECG leads overlie the chest.    The cardiac silhouette and central vascular structures are unchanged.  The trachea is midline. Evaluation of the left mid and lower lung field is limited secondary to superimposed artifact from the patient's hand overlying the chest.  Within limitations, no convincing new or worsened airspace consolidation is identified.  There is no large pleural effusion or convincing pneumothorax.    Regional osseous structures and extrathoracic soft tissues are similar.    IMPRESSION  No acute process or other adverse interval change identified within technical limitations discussed above.      Electronically signed by: Isaac  Carcamo  Date:    02/26/2025  Time:    21:52                                     Medications   pantoprazole injection 80 mg (80 mg Intravenous Given 2/26/25 2225)   lactated ringers bolus 1,000 mL (0 mLs Intravenous Stopped 2/27/25 0057)   diltiaZEM injection 10 mg (10 mg Intravenous Given 2/27/25 0124)   iohexoL (OMNIPAQUE 350) injection 100 mL (100 mLs Intravenous Given 2/27/25 0128)   vancomycin 1,500 mg in D5W 250 mL IVPB (admixture device) (0 mg Intravenous Stopped 2/27/25 0643)   potassium bicarbonate disintegrating tablet 40 mEq (40 mEq Oral Given 2/27/25 0511)   mupirocin 2 % ointment ( Nasal Given 3/3/25 2056)   sodium chloride 0.9% bolus 500 mL 500 mL (0 mLs Intravenous Stopped 2/27/25 1215)   diatrizoate meglumineand-diatrizoate sodium (GASTROVIEW) solution 240 mL (240 mLs Per G Tube Given 2/28/25 0841)   potassium bicarbonate disintegrating tablet 50 mEq (50 mEq Per G Tube Given 3/1/25 0840)   ondansetron 4 mg/2 mL injection (4 mg  Given 3/1/25 1623)   potassium bicarbonate disintegrating tablet 40 mEq (40 mEq Oral Given 3/2/25 0819)   potassium chloride 20 mEq in 100 mL IVPB (FOR CENTRAL LINE ADMINISTRATION ONLY) (0 mEq Intravenous Stopped 3/2/25 1414)   magnesium sulfate 2g in water 50mL IVPB (premix) (0 g Intravenous Stopped 3/3/25 0728)   potassium chloride 20 mEq in 100 mL IVPB (FOR CENTRAL LINE ADMINISTRATION ONLY) (0 mEq Intravenous Stopped 3/3/25 1210)     Medical Decision Making  Problems Addressed:  Aspiration pneumonia, unspecified aspiration pneumonia type, unspecified laterality, unspecified part of lung: acute illness or injury  Complicated UTI (urinary tract infection): acute illness or injury  Symptomatic anemia: acute illness or injury    Amount and/or Complexity of Data Reviewed  Labs: ordered.  Radiology: ordered.    Risk  Prescription drug management.  Decision regarding hospitalization.    Differential diagnosis (includes but is not limited to):   Symptomatic anemia, upper GI bleed,  lower GI bleed, aspiration pneumonia, dehydration, kidney injury, electrolyte abnormalities, urinary infection    MDM Narrative  69-year-old male presents from the nursing home, trach/PEG dependent, for anemia.  Labs reviewed.  Chest x-ray reviewed.  CT scan reviewed.  Antibiotics for aspiration pneumonia ongoing, blood cultures pending, lactic acid mildly elevated.  Continue IV fluid resuscitation.  Transfusion ongoing.  GI consulted.  Patient continues to require his home vent for support through his trach.  Case discussed with ICU, will admit.    Dispo:  Admit    My independent radiology interpretation:   Point of care US (independently performed and interpreted):   Decision rules/clinical scoring:     Sepsis Perfusion Assessment:     Amount and/or Complexity of Data Reviewed  Independent historian: EMS  Summary of history: Per nursing home, the pt had lab work drawn today that showed a hemoglobin of 6. The pt is nonverbal as well as trach and PEG tube dependent at baseline  External data reviewed: notes from previous ED visits and notes from clinic visits  Summary of data reviewed:  Prior records and nursing home records reviewed  Risk and benefits of testing: discussed   Labs: ordered and reviewed  Radiology: ordered and independent interpretation performed (see above or ED course)  ECG/medicine tests: ordered and independent interpretation performed (see above or ED course)  Discussion of management or test interpretation with external provider(s): discussed with ICU, GI consultant   Summary of discussion:  As above    Risk  Parenteral controlled substances   Drug therapy requiring intense monitoring for toxicity   Decision regarding hospitalization  Shared decision making     Critical Care  30-74 minutes     Data Reviewed/Counseling: I have personally reviewed the patient's vital signs, nursing notes, and other relevant tests, information, and imaging. I had a detailed discussion regarding the historical  points, exam findings, and any diagnostic results supporting the discharge diagnosis. I personally performed the history, PE, MDM and procedures as documented above and agree with the scribe's documentation.    Portions of this note were dictated using voice recognition software. Although it was reviewed for accuracy, some inherent voice recognition errors may have occurred and may be present in this document.         Scribe Attestation:   Scribe #1: I performed the above scribed service and the documentation accurately describes the services I performed. I attest to the accuracy of the note.    Attending Attestation:           Physician Attestation for Scribe:  Physician Attestation Statement for Scribe #1: I, Itz Camara MD, reviewed documentation, as scribed by Patricia Lyons in my presence, and it is both accurate and complete.             ED Course as of 03/07/25 1329   Wed Feb 26, 2025 2130 X-Ray Chest AP Portable  Independently visualized/reviewed by me during the ED visit.  - no lobar consolidation or pneumothorax identified [MC]   2130 EKG independently interpreted by me.  EKG: A Fib @ 105, no STEMI, QTc 504 [MC]      ED Course User Index  [MC] Itz Camara MD                           Clinical Impression:  Final diagnoses:  [D64.9] Symptomatic anemia  [J69.0] Aspiration pneumonia, unspecified aspiration pneumonia type, unspecified laterality, unspecified part of lung (Primary)  [S31.000A] Wound of sacral region, initial encounter  [N39.0] Complicated UTI (urinary tract infection)          ED Disposition Condition    Admit Stable                    [1]   Social History  Tobacco Use    Smoking status: Never    Smokeless tobacco: Never   Substance Use Topics    Alcohol use: Not Currently    Drug use: Not Currently        Itz Camara MD  03/07/25 [id number]

## 2025-02-28 LAB
ALBUMIN SERPL-MCNC: 2.1 G/DL (ref 3.4–4.8)
ALBUMIN/GLOB SERPL: 0.5 RATIO (ref 1.1–2)
ALP SERPL-CCNC: 73 UNIT/L (ref 40–150)
ALT SERPL-CCNC: 9 UNIT/L (ref 0–55)
ANION GAP SERPL CALC-SCNC: 6 MEQ/L
AST SERPL-CCNC: 11 UNIT/L (ref 5–34)
BASOPHILS # BLD AUTO: 0.04 X10(3)/MCL
BASOPHILS NFR BLD AUTO: 0.6 %
BILIRUB SERPL-MCNC: 0.9 MG/DL
BUN SERPL-MCNC: 16.4 MG/DL (ref 8.4–25.7)
CALCIUM SERPL-MCNC: 8 MG/DL (ref 8.8–10)
CHLORIDE SERPL-SCNC: 115 MMOL/L (ref 98–107)
CO2 SERPL-SCNC: 22 MMOL/L (ref 23–31)
CREAT SERPL-MCNC: 0.59 MG/DL (ref 0.72–1.25)
CREAT/UREA NIT SERPL: 28
EOSINOPHIL # BLD AUTO: 0.12 X10(3)/MCL (ref 0–0.9)
EOSINOPHIL NFR BLD AUTO: 1.9 %
ERYTHROCYTE [DISTWIDTH] IN BLOOD BY AUTOMATED COUNT: 19.4 % (ref 11.5–17)
GFR SERPLBLD CREATININE-BSD FMLA CKD-EPI: >60 ML/MIN/1.73/M2
GLOBULIN SER-MCNC: 3.9 GM/DL (ref 2.4–3.5)
GLUCOSE SERPL-MCNC: 96 MG/DL (ref 82–115)
HCT VFR BLD AUTO: 26.6 % (ref 42–52)
HGB BLD-MCNC: 8.2 G/DL (ref 14–18)
IMM GRANULOCYTES # BLD AUTO: 0.04 X10(3)/MCL (ref 0–0.04)
IMM GRANULOCYTES NFR BLD AUTO: 0.6 %
LEVETIRACETAM SERPL-MCNC: 13.4 MCG/ML (ref 10–40)
LYMPHOCYTES # BLD AUTO: 1.37 X10(3)/MCL (ref 0.6–4.6)
LYMPHOCYTES NFR BLD AUTO: 21.5 %
MAGNESIUM SERPL-MCNC: 2 MG/DL (ref 1.6–2.6)
MCH RBC QN AUTO: 27.6 PG (ref 27–31)
MCHC RBC AUTO-ENTMCNC: 30.8 G/DL (ref 33–36)
MCV RBC AUTO: 89.6 FL (ref 80–94)
MONOCYTES # BLD AUTO: 0.5 X10(3)/MCL (ref 0.1–1.3)
MONOCYTES NFR BLD AUTO: 7.8 %
NEUTROPHILS # BLD AUTO: 4.3 X10(3)/MCL (ref 2.1–9.2)
NEUTROPHILS NFR BLD AUTO: 67.6 %
NRBC BLD AUTO-RTO: 0 %
PHOSPHATE SERPL-MCNC: 3 MG/DL (ref 2.3–4.7)
PLATELET # BLD AUTO: 276 X10(3)/MCL (ref 130–400)
PMV BLD AUTO: 10 FL (ref 7.4–10.4)
POTASSIUM SERPL-SCNC: 3.9 MMOL/L (ref 3.5–5.1)
PREALB SERPL-MCNC: 17.5 MG/DL (ref 16–42)
PROT SERPL-MCNC: 6 GM/DL (ref 5.8–7.6)
RBC # BLD AUTO: 2.97 X10(6)/MCL (ref 4.7–6.1)
SODIUM SERPL-SCNC: 143 MMOL/L (ref 136–145)
WBC # BLD AUTO: 6.37 X10(3)/MCL (ref 4.5–11.5)

## 2025-02-28 PROCEDURE — 99900031 HC PATIENT EDUCATION (STAT)

## 2025-02-28 PROCEDURE — 84100 ASSAY OF PHOSPHORUS: CPT | Performed by: STUDENT IN AN ORGANIZED HEALTH CARE EDUCATION/TRAINING PROGRAM

## 2025-02-28 PROCEDURE — 83497 ASSAY OF 5-HIAA: CPT | Performed by: PHYSICIAN ASSISTANT

## 2025-02-28 PROCEDURE — 27100171 HC OXYGEN HIGH FLOW UP TO 24 HOURS

## 2025-02-28 PROCEDURE — 99900035 HC TECH TIME PER 15 MIN (STAT)

## 2025-02-28 PROCEDURE — 27000207 HC ISOLATION

## 2025-02-28 PROCEDURE — 94761 N-INVAS EAR/PLS OXIMETRY MLT: CPT

## 2025-02-28 PROCEDURE — 85025 COMPLETE CBC W/AUTO DIFF WBC: CPT | Performed by: STUDENT IN AN ORGANIZED HEALTH CARE EDUCATION/TRAINING PROGRAM

## 2025-02-28 PROCEDURE — 36415 COLL VENOUS BLD VENIPUNCTURE: CPT | Performed by: STUDENT IN AN ORGANIZED HEALTH CARE EDUCATION/TRAINING PROGRAM

## 2025-02-28 PROCEDURE — 94003 VENT MGMT INPAT SUBQ DAY: CPT

## 2025-02-28 PROCEDURE — 25000003 PHARM REV CODE 250: Performed by: STUDENT IN AN ORGANIZED HEALTH CARE EDUCATION/TRAINING PROGRAM

## 2025-02-28 PROCEDURE — 99900026 HC AIRWAY MAINTENANCE (STAT)

## 2025-02-28 PROCEDURE — 80053 COMPREHEN METABOLIC PANEL: CPT | Performed by: STUDENT IN AN ORGANIZED HEALTH CARE EDUCATION/TRAINING PROGRAM

## 2025-02-28 PROCEDURE — 63600175 PHARM REV CODE 636 W HCPCS: Performed by: INTERNAL MEDICINE

## 2025-02-28 PROCEDURE — 25000003 PHARM REV CODE 250: Performed by: INTERNAL MEDICINE

## 2025-02-28 PROCEDURE — 87150 DNA/RNA AMPLIFIED PROBE: CPT | Performed by: EMERGENCY MEDICINE

## 2025-02-28 PROCEDURE — 20000000 HC ICU ROOM

## 2025-02-28 PROCEDURE — 25500020 PHARM REV CODE 255: Performed by: PHYSICIAN ASSISTANT

## 2025-02-28 PROCEDURE — 83735 ASSAY OF MAGNESIUM: CPT | Performed by: STUDENT IN AN ORGANIZED HEALTH CARE EDUCATION/TRAINING PROGRAM

## 2025-02-28 PROCEDURE — 63600175 PHARM REV CODE 636 W HCPCS: Performed by: STUDENT IN AN ORGANIZED HEALTH CARE EDUCATION/TRAINING PROGRAM

## 2025-02-28 PROCEDURE — 0QB10ZZ EXCISION OF SACRUM, OPEN APPROACH: ICD-10-PCS | Performed by: EMERGENCY MEDICINE

## 2025-02-28 PROCEDURE — 87077 CULTURE AEROBIC IDENTIFY: CPT | Performed by: EMERGENCY MEDICINE

## 2025-02-28 PROCEDURE — 31502 CHANGE OF WINDPIPE AIRWAY: CPT

## 2025-02-28 PROCEDURE — 94760 N-INVAS EAR/PLS OXIMETRY 1: CPT

## 2025-02-28 RX ORDER — DIATRIZOATE MEGLUMINE AND DIATRIZOATE SODIUM 660; 100 MG/ML; MG/ML
240 SOLUTION ORAL; RECTAL
Status: COMPLETED | OUTPATIENT
Start: 2025-02-28 | End: 2025-02-28

## 2025-02-28 RX ORDER — CEFEPIME HYDROCHLORIDE 2 G/1
2 INJECTION, POWDER, FOR SOLUTION INTRAVENOUS
Status: DISCONTINUED | OUTPATIENT
Start: 2025-02-28 | End: 2025-03-04

## 2025-02-28 RX ADMIN — QUETIAPINE FUMARATE 25 MG: 25 TABLET ORAL at 08:02

## 2025-02-28 RX ADMIN — PANTOPRAZOLE SODIUM 40 MG: 40 INJECTION, POWDER, LYOPHILIZED, FOR SOLUTION INTRAVENOUS at 10:02

## 2025-02-28 RX ADMIN — MUPIROCIN: 20 OINTMENT TOPICAL at 09:02

## 2025-02-28 RX ADMIN — SODIUM CHLORIDE: 9 INJECTION, SOLUTION INTRAVENOUS at 03:02

## 2025-02-28 RX ADMIN — BUSPIRONE HYDROCHLORIDE 5 MG: 5 TABLET ORAL at 08:02

## 2025-02-28 RX ADMIN — METOPROLOL TARTRATE 25 MG: 25 TABLET, FILM COATED ORAL at 03:02

## 2025-02-28 RX ADMIN — PANTOPRAZOLE SODIUM 40 MG: 40 INJECTION, POWDER, LYOPHILIZED, FOR SOLUTION INTRAVENOUS at 08:02

## 2025-02-28 RX ADMIN — AMIODARONE HYDROCHLORIDE 200 MG: 200 TABLET ORAL at 03:02

## 2025-02-28 RX ADMIN — METOPROLOL TARTRATE 25 MG: 25 TABLET, FILM COATED ORAL at 08:02

## 2025-02-28 RX ADMIN — DIATRIZOATE MEGLUMINE AND DIATRIZOATE SODIUM 240 ML: 660; 100 LIQUID ORAL; RECTAL at 08:02

## 2025-02-28 RX ADMIN — CHOLESTYRAMINE 4 GRAMS OF ANHYDROUS CHOLESTYRAMINE: 4 POWDER, FOR SUSPENSION ORAL at 10:02

## 2025-02-28 RX ADMIN — BUSPIRONE HYDROCHLORIDE 5 MG: 5 TABLET ORAL at 04:02

## 2025-02-28 RX ADMIN — VENLAFAXINE 75 MG: 37.5 TABLET ORAL at 08:02

## 2025-02-28 RX ADMIN — CEFEPIME 2 G: 2 INJECTION, POWDER, FOR SOLUTION INTRAVENOUS at 10:02

## 2025-02-28 RX ADMIN — SUCRALFATE 1 G: 1 TABLET ORAL at 04:02

## 2025-02-28 RX ADMIN — SUCRALFATE 1 G: 1 TABLET ORAL at 06:02

## 2025-02-28 RX ADMIN — MUPIROCIN: 20 OINTMENT TOPICAL at 08:02

## 2025-02-28 RX ADMIN — SUCRALFATE 1 G: 1 TABLET ORAL at 08:02

## 2025-02-28 RX ADMIN — CEFEPIME 2 G: 2 INJECTION, POWDER, FOR SOLUTION INTRAVENOUS at 03:02

## 2025-02-28 NOTE — PROCEDURES
"Delio Daneil Jr. is a 69 y.o. male patient.    Temp: 98 °F (36.7 °C) (02/28/25 0800)  Pulse: 110 (02/28/25 1159)  Resp: (!) 6 (02/28/25 1159)  BP: (!) 109/91 (02/28/25 1102)  SpO2: 99 % (02/28/25 1159)  Weight: 68.7 kg (151 lb 7.3 oz) (02/27/25 1622)  Height: 5' 7" (170.2 cm) (02/26/25 2027)     Seeing patient today around noon to send a bone specimen to microbiology.  Patient is alone in the room.  He is awake with eyes open.  He is on the vent    Patient was turned with the assistance of Marcello Dewitt nurse practitioner  Rectal tube with large light brown liquid output  Sacrum:  Large stage IV ulcer noted   midline and left-sided sacral bone exposure some of which falls into the dressing as it was taken down.  Edges especially have stringy fibrous nonviable tissue.  Undermines significantly on the right side so actually larger than it appears  I used rongeurs along with pickups and scissors to trim away the nonviable tissue as well as to get a fresh bone specimen for microbiology.  Held pressure at the site of bone biopsy.  It was not actively bleeding but for extra assurance I did place a piece of Surgicel (I also pointed out the area to the ICU RN at bedside)  Redressed: Vashe moistened Mesalt, gauze and then ABD pad  Continue aggressive wound care and offloading   We will follow culture report  Discussed case in person with Dr. Hutchins      Debridement    Date/Time: 2/28/2025 1:31 PM    Performed by: Tali Carpenter MD  Authorized by: Tali Carpenter MD    Local anesthesia used?: Yes    Local anesthetic:  Topical anesthetic    Wound Details:    Location:  Sacrum    Type of Debridement:  Excisional       Length (cm):  10       Width (cm):  13       Depth (cm):  3       Area (sq cm):  102.1       Percent Debrided (%):  20       Total Area Debrided (sq cm):  20.42    Depth of debridement:  Subcutaneous tissue    Tissue debrided:  Dermis, Epidermis, Subcutaneous, Bone and Other    Devitalized tissue " debrided:  Biofilm, Slough, Exudate, Fibrin, Necrotic/Eschar and Other    Instruments:  Forceps, Scissors and Rongeur  Bleeding:  Minimal  Hemostasis Achieved: Yes  Method Used:  Pressure and Other  Patient tolerance:  Patient tolerated the procedure well with no immediate complications  Specimen Collected: Specimen sent to microbiology     Specimen given to nurse to order culture and send to micro.  Does not need pathology as clinically he has sacral osteomyelitis with multiple areas of spontaneous bone detachment /breakage and fragmentation      2/28/2025

## 2025-02-28 NOTE — PROGRESS NOTES
[facility] - 7th Floor ICU  Pulmonary/Critical Care  Progress Note  2/28/2025    Patient Name: Delio Daniel Jr.  MRN: [id number]  Admission Date: 2/26/2025  Code Status: Full Code      Subjective:     HPI:  The patient is a 69-year-old male nursing home resident at [facility] with a history of hemorrhagic stroke 2023, chronic atrial fibrillation, coronary artery disease, ventricular arrhythmias with high-degree AV block, post p.m./AICD, and hypertension.  He is reported as nonverbal with dysphagia (PEG in place), chronic tracheostomy and mechanical ventilation since his stroke, chronic indwelling Fernandez, and is chronically bedridden.  He presented to ER 02/27/2025 with H/H of 7.1/24.6 that was noted on routine blood work.  He was transfused with 2 units of PRBCs.  CT of the chest revealed minimal patchy ground-glass infiltrates right middle and right lower lobe.    Hospital Course:      24hr Interval History:  I have extensively reviewed this patient's hospital stay thus far, as being seen by me for the first time this morning.  He is receiving no sedation.  He remains at his baseline unresponsive neurologic status.  No significant secretions per endotracheal tube.  He has been reported as having multiple vomiting episodes over the past week.  No arrhythmias or hypotension.  No reported oxygenation issues, reported as tolerating ventilatory support well.  Wound Care has evaluated patient, feels high probability for osteomyelitis sacral wound.    SIMV 15/500 cc/PS +10/peep +5/30%    Scheduled Medications:   amiodarone  200 mg Per G Tube Daily    atorvastatin  10 mg Per G Tube Daily    busPIRone  5 mg Per G Tube TID    cholestyramine-aspartame  1 packet Per G Tube BID    finasteride  5 mg Per G Tube Daily    metoprolol  5 mg Intravenous Once    metoprolol tartrate  25 mg Per G Tube BID    mupirocin   Nasal BID    pantoprazole  40 mg Intravenous BID    QUEtiapine  25 mg Per G Tube BID    sucralfate  1 g Per G  Tube QID (AC & HS)    venlafaxine  75 mg Per G Tube BID     PRN Medications:    Current Facility-Administered Medications:     0.9%  NaCl infusion (for blood administration), , Intravenous, Q24H PRN    albuterol-ipratropium, 3 mL, Nebulization, Q6H PRN    labetalol, 10 mg, Intravenous, Q2H PRN    sodium chloride 0.9%, 10 mL, Intravenous, PRN  Continuous Infusions:   0.9% NaCl   Intravenous Continuous 100 mL/hr at 02/28/25 0625 Rate Verify at 02/28/25 0625       Past Medical History:   Diagnosis Date    Arthritis     Atrial fibrillation     BPH (benign prostatic hyperplasia)     Cardiac arrest     Coronary artery disease     Cyst, kidney, acquired     Diverticulosis     Hyperlipidemia     Hypertension     MI (myocardial infarction)     Obesity     Steatosis of liver     Stroke        Past Surgical History:   Procedure Laterality Date    A-V CARDIAC PACEMAKER INSERTION Right     CARDIAC CATHETERIZATION      COLONOSCOPY W/ BIOPSIES      CRANIECTOMY Right 12/20/2023    Procedure: CRANIECTOMY;  Surgeon: Artem Can MD;  Location: [facility];  Service: Neurosurgery;  Laterality: Right;    ESOPHAGOGASTRODUODENOSCOPY W/ PEG N/A 1/2/2024    Procedure: PEG;  Surgeon: Tani Day MD;  Location: [facility] ENDOSCOPY;  Service: Gastroenterology;  Laterality: N/A;    ESOPHAGOGASTRODUODENOSCOPY W/ PEG N/A 10/30/2024    Procedure: EGD w/ Jtube placement;  Surgeon: Gabriele Salcido MD;  Location: [facility] ENDOSCOPY;  Service: Endoscopy;  Laterality: N/A;  with J tube extension    excision of colon      TRACHEOSTOMY N/A 12/29/2023    Procedure: CREATION, TRACHEOSTOMY;  Surgeon: Patricia Winslow MD;  Location: [facility];  Service: ENT;  Laterality: N/A;  REQ 1130 //  NEEDS 2 SCRUBS       Objective:     Input/output:    Intake/Output Summary (Last 24 hours) at 2/28/2025 0704  Last data filed at 2/28/2025 0625  Gross per 24 hour   Intake 3793.81 ml   Output 475 ml   Net 3318.81 ml       Vital Signs (Most Recent):  Temp: 98 °F  (36.7 °C) (02/28/25 0400)  Pulse: 96 (02/28/25 0600)  Resp: 12 (02/28/25 0600)  BP: (!) 123/92 (02/28/25 0600)  SpO2: 100 % (02/28/25 0600)  Body mass index is 23.72 kg/m².  Weight: 68.7 kg (151 lb 7.3 oz) Vital Signs (24h Range):  Temp:  [97.5 °F (36.4 °C)-98.9 °F (37.2 °C)] 98 °F (36.7 °C)  Pulse:  [] 96  Resp:  [0-32] 12  SpO2:  [96 %-100 %] 100 %  BP: ()/(68-96) 123/92     Physical Exam  Constitutional:       Comments: Awake, nonverbal, does not follow commands, appears comfortable on mechanical ventilation   Neck:      Comments: Trach in place with no evidence of infection or drainage  Pulmonary:      Effort: No respiratory distress.      Breath sounds: No wheezing, rhonchi or rales.   Abdominal:      General: There is no distension.      Palpations: Abdomen is soft.      Comments: PEG epigastric   Musculoskeletal:      Right lower leg: No edema.      Left lower leg: No edema.      Comments: Flexion contractures upper and lower extremities bilateral   Neurological:      Comments: Nonverbal, no following of commands         Lines/Drains/Airways       Drain  Duration                  Gastrostomy/Enterostomy 10/30/24 1200 Gastrostomy-jejunostomy feeding 120 days         Urethral Catheter 02/27/25 0400 16 Fr. 1 day              Airway  Duration             Adult Surgical Airway 08/19/24 0120 Shiley Extra Large Cuffed Distal 6.0/ 75mm 193 days              Peripheral Intravenous Line  Duration                  Midline Catheter - Double Lumen Right basilic vein (medial side of arm) -- days         Peripheral IV - Single Lumen 02/27/25 0500 18 G Yes Anterior;Proximal;Right Forearm 1 day                    Vent:  Vent Mode: SIMV (PRVC) + PS (02/28/25 0325)  Set Rate: 15 BPM (02/28/25 0325)  Vt Set: 500 mL (02/28/25 0325)  Pressure Support: 10 cmH20 (02/28/25 0325)  PEEP/CPAP: 5 cmH20 (02/28/25 0325)  Oxygen Concentration (%): 30 (02/28/25 0400)  Peak Airway Pressure: 14 cmH20 (02/28/25 0325)  Total Ve: 8.5  L/m (02/28/25 0325)  F/VT Ratio<105 (RSBI): (!) 57.2 (02/28/25 0325)    ABGs:  Lab Results   Component Value Date    PH 7.480 (H) 01/07/2025    PO2 96.0 01/07/2025    PCO2 39.0 01/07/2025    FXQ5ALJ 32.6 (A) 01/17/2024    POCSATURATED 98 02/02/2024         Significant Labs:    Lab Results   Component Value Date    WBC 6.37 02/28/2025    HGB 8.2 (L) 02/28/2025    HCT 26.6 (L) 02/28/2025    MCV 89.6 02/28/2025     02/28/2025         Recent Labs   Lab 02/28/25 0355      K 3.9   *   CO2 22*   BUN 16.4   CREATININE 0.59*   CALCIUM 8.0*   MG 2.00   PHOS 3.0   AST 11   ALT 9   ALKPHOS 73   ALBUMIN 2.1*     Imaging:   I have reviewed CT chest images    Assessment:     Hemorrhagic stroke 2023, remains nonverbal and bedridden since that time, chronic nursing home resident of [facility].  Chronic respiratory failure secondary to above, on permanent mechanical ventilatory support at nursing home  Coronary artery disease with previous history of high-degree AV block and VFib arrest, post permanent pacer/AICD  Stage IV sacral decubitus with probable coccygeal osteomyelitis by CT imaging.  Reported daily vomiting episodes at nursing home over the past week  Chronic anemia, post transfusion 2 units PRBCs on arrival to ER.  No current evidence of ongoing active bleeding  Hypertension    Plan:     Continue current baseline mechanical ventilatory settings  As per Gastroenterology concerning of evaluation of vomiting episodes.  PEG feedings currently on hold.  As per Wound Care recommendations, actively following patient.  IV cefepime (previous Klebsiella and Providencia by cultures 02/10/2025 both sensitive)       35 minutes of critical care was time spent personally by me on the following activities: development of treatment plan with patient or surrogate and bedside caregivers, discussions with consultants, evaluation of patient's response to treatment, examination of patient, ordering and performing treatments and  interventions, ordering and review of laboratory studies, ordering and review of radiographic studies, pulse oximetry, re-evaluation of patient's condition.  This patient demonstrates a high probability for further clinical decompensation due to ongoing critical illness.  Critical care time did not overlap with that of any other provider or involve time for any procedures.       Miriam Hutchins MD, [facility]  P[facility]/Critical Care

## 2025-02-28 NOTE — PROGRESS NOTES
"Gastroenterology Progress Note    Subjective/Interval History:  Tolerated trickle TFs via J throughout the day yesterday with 0-5cc residuals q4 hours.   He had 4 loose stools yesterday.   Digni placed due to known large sacral decubitus that is getting soiled with each stool. Stool is light brown in color.  No melena or hematochezia.   SBS underway at present.  Although some question regarding gastrograffin possibly administered through J port as opposed to the G port.  No episodes of vomiting yesterday afternoon, overnight, or so far today.   Meds held this AM.     ROS:  Review of Systems   Unable to perform ROS: Other   Nonverbal. Trach on vent.     Vital Signs:  /86   Pulse 94   Temp 98 °F (36.7 °C) (Oral)   Resp (!) 0   Ht 5' 7" (1.702 m)   Wt 68.7 kg (151 lb 7.3 oz)   SpO2 100%   BMI 23.72 kg/m²   Body mass index is 23.72 kg/m².    Physical Exam:  Physical Exam  Constitutional:       General: He is not in acute distress.     Appearance: He is ill-appearing (chronically).   HENT:      Head: Normocephalic and atraumatic.   Eyes:      General: No scleral icterus.  Cardiovascular:      Rate and Rhythm: Normal rate and regular rhythm.   Pulmonary:      Effort: Pulmonary effort is normal. No respiratory distress.      Comments: Trach on vent  Abdominal:      General: Bowel sounds are normal. There is no distension.      Palpations: Abdomen is soft. There is no mass.      Tenderness: There is no abdominal tenderness (seeminly non-tender). There is no guarding or rebound.      Comments: G-J in epigastrium/LUQ c/d/i with no TFs, external bumper at 3.5cm. Prior lap scar noted.   Musculoskeletal:      Right lower leg: No edema.      Left lower leg: No edema.      Comments: contracted   Skin:     General: Skin is warm and dry.      Coloration: Skin is not jaundiced.   Neurological:      Comments: Reportedly at bedside. Non-verbal. Seems to track.      Labs:  Recent Results (from the past 48 hours) "   Comprehensive metabolic panel    Collection Time: 02/26/25  9:07 PM   Result Value Ref Range    Sodium 143 136 - 145 mmol/L    Potassium 3.2 (L) 3.5 - 5.1 mmol/L    Chloride 108 (H) 98 - 107 mmol/L    CO2 24 23 - 31 mmol/L    Glucose 90 82 - 115 mg/dL    Blood Urea Nitrogen 20.2 8.4 - 25.7 mg/dL    Creatinine 0.63 (L) 0.72 - 1.25 mg/dL    Calcium 8.8 8.8 - 10.0 mg/dL    Protein Total 7.8 (H) 5.8 - 7.6 gm/dL    Albumin 2.5 (L) 3.4 - 4.8 g/dL    Globulin 5.3 (H) 2.4 - 3.5 gm/dL    Albumin/Globulin Ratio 0.5 (L) 1.1 - 2.0 ratio    Bilirubin Total 1.2 <=1.5 mg/dL    ALP 86 40 - 150 unit/L    ALT 8 0 - 55 unit/L    AST 16 5 - 34 unit/L    eGFR >60 mL/min/1.73/m2    Anion Gap 11.0 mEq/L    BUN/Creatinine Ratio 32    Troponin I    Collection Time: 02/26/25  9:07 PM   Result Value Ref Range    Troponin-I <0.010 0.000 - 0.045 ng/mL   Magnesium    Collection Time: 02/26/25  9:07 PM   Result Value Ref Range    Magnesium Level 2.30 1.60 - 2.60 mg/dL   Protime-INR    Collection Time: 02/26/25  9:07 PM   Result Value Ref Range    PT 17.1 (H) 12.5 - 14.5 seconds    INR 1.4 (H) <=1.3   Type & Screen    Collection Time: 02/26/25  9:07 PM   Result Value Ref Range    Group & Rh A POS     Indirect Kaila GEL NEG     Specimen Outdate 03/01/2025 23:59    APTT    Collection Time: 02/26/25  9:07 PM   Result Value Ref Range    PTT 35.1 (H) 23.2 - 33.7 seconds   Brain natriuretic peptide    Collection Time: 02/26/25  9:07 PM   Result Value Ref Range    Natriuretic Peptide 246.2 (H) <=100.0 pg/mL   CBC with Differential    Collection Time: 02/26/25  9:07 PM   Result Value Ref Range    WBC 6.47 4.50 - 11.50 x10(3)/mcL    RBC 2.67 (L) 4.70 - 6.10 x10(6)/mcL    Hgb 7.1 (L) 14.0 - 18.0 g/dL    Hct 24.6 (L) 42.0 - 52.0 %    MCV 92.1 80.0 - 94.0 fL    MCH 26.6 (L) 27.0 - 31.0 pg    MCHC 28.9 (L) 33.0 - 36.0 g/dL    RDW 21.4 (H) 11.5 - 17.0 %    Platelet 329 130 - 400 x10(3)/mcL    MPV 10.4 7.4 - 10.4 fL    Neut % 70.4 %    Lymph % 21.2 %    Mono  % 7.0 %    Eos % 0.6 %    Basophil % 0.5 %    Imm Grans % 0.3 %    Neut # 4.56 2.1 - 9.2 x10(3)/mcL    Lymph # 1.37 0.6 - 4.6 x10(3)/mcL    Mono # 0.45 0.1 - 1.3 x10(3)/mcL    Eos # 0.04 0 - 0.9 x10(3)/mcL    Baso # 0.03 <=0.2 x10(3)/mcL    Imm Gran # 0.02 0.00 - 0.04 x10(3)/mcL    NRBC% 0.0 %   Prepare RBC 2 Units; symptomatic anemia    Collection Time: 02/26/25  9:07 PM   Result Value Ref Range    UNIT NUMBER [id number]     UNIT ABO/RH A POS     DISPENSE STATUS Transfused     Unit Expiration [id number]     Product Code N7744O14     Unit Blood Type Code 6200     CROSSMATCH INTERPRETATION Compatible     UNIT NUMBER [id number]     UNIT ABO/RH A POS     DISPENSE STATUS Transfused     Unit Expiration [id number]     Product Code T4571X20     Unit Blood Type Code 6200     CROSSMATCH INTERPRETATION Compatible    Iron and TIBC    Collection Time: 02/26/25  9:07 PM   Result Value Ref Range    Iron Binding Capacity Unsaturated 185 60 - 240 ug/dL    Iron Level 34 (L) 65 - 175 ug/dL    Transferrin 206 163 - 344 mg/dL    Iron Binding Capacity Total 219 (L) 250 - 450 ug/dL    Iron Saturation 16 (L) 20 - 50 %   Ferritin    Collection Time: 02/26/25  9:07 PM   Result Value Ref Range    Ferritin Level 651.13 (H) 21.81 - 274.66 ng/mL   Urinalysis, Reflex to Urine Culture    Collection Time: 02/27/25 12:57 AM    Specimen: Urine   Result Value Ref Range    Color, UA Yellow Yellow, Light-Yellow, Colorless, Straw, Dark-Yellow    Appearance, UA Turbid (A) Clear    Specific Gravity, UA 1.027 1.005 - 1.030    pH, UA 6.0 5.0 - 8.5    Protein, UA 1+ (A) Negative    Glucose, UA Normal Negative, Normal    Ketones, UA Negative Negative    Blood, UA Negative Negative    Bilirubin, UA Negative Negative    Urobilinogen, UA 4.0 (A) 0.2, 1.0, Normal    Nitrites, UA Negative Negative    Leukocyte Esterase,  (A) Negative    RBC, UA 0-5 None Seen, 0-2, 3-5, 0-5 /HPF    WBC, UA >100 (A) None Seen, 0-2, 3-5, 0-5 /HPF    Bacteria, UA Rare  None Seen, Rare, Occasional /HPF    Budding Yeast, UA Many /HPF    Squamous Epithelial Cells, UA None Seen /HPF    Mucous, UA Trace (A) None Seen /LPF    Calcium Oxalate Crystals, UA Few (A) None Seen   Urine culture    Collection Time: 02/27/25 12:57 AM    Specimen: Urine   Result Value Ref Range    Urine Culture (A)      >/= 100,000 colonies/ml Candida albicans/dubliniensis    Urine Culture 50,000-75,000 colonies/ml Gram-negative Rods (A)    Urinalysis, Reflex to Urine Culture    Collection Time: 02/27/25  4:45 AM    Specimen: Urine   Result Value Ref Range    Color, UA Yellow Yellow, Light-Yellow, Colorless, Straw, Dark-Yellow    Appearance, UA Clear Clear    Specific Gravity, UA >1.050 (H) 1.005 - 1.030    pH, UA 7.0 5.0 - 8.5    Protein, UA 1+ (A) Negative    Glucose, UA Normal Negative, Normal    Ketones, UA Negative Negative    Blood, UA Negative Negative    Bilirubin, UA Negative Negative    Urobilinogen, UA 8.0 (A) 0.2, 1.0, Normal    Nitrites, UA Negative Negative    Leukocyte Esterase, UA 75 (A) Negative    RBC, UA 0-5 None Seen, 0-2, 3-5, 0-5 /HPF    WBC, UA 21-50 (A) None Seen, 0-2, 3-5, 0-5 /HPF    Bacteria, UA None Seen None Seen, Trace /HPF    Squamous Epithelial Cells, UA None Seen None Seen, Trace, Rare /HPF    Calcium Oxalate Crystals, UA Trace (A) None Seen   Respiratory Culture    Collection Time: 02/27/25  4:46 AM    Specimen: Sputum   Result Value Ref Range    Respiratory Culture Moderate Gram-negative Rods (A)     GRAM STAIN Quality 1+     GRAM STAIN Many Gram Positive Rods     GRAM STAIN Many Gram Negative Rods    Gram Stain    Collection Time: 02/27/25  4:46 AM    Specimen: Sputum   Result Value Ref Range    GRAM STAIN Quality 1+     GRAM STAIN Many Gram Positive Rods     GRAM STAIN Many Gram Negative Rods    MRSA PCR    Collection Time: 02/27/25  4:48 AM   Result Value Ref Range    MRSA PCR Screen Not Detected Not Detected   EKG 12-lead    Collection Time: 02/27/25  6:08 AM   Result Value  Ref Range    QRS Duration 82 ms    [facility] QTC Calculation 504 ms   Lactic acid, plasma    Collection Time: 02/27/25  8:55 AM   Result Value Ref Range    Lactic Acid Level 2.6 (H) 0.5 - 2.2 mmol/L   Basic Metabolic Panel    Collection Time: 02/27/25  8:55 AM   Result Value Ref Range    Sodium 140 136 - 145 mmol/L    Potassium 3.7 3.5 - 5.1 mmol/L    Chloride 109 (H) 98 - 107 mmol/L    CO2 24 23 - 31 mmol/L    Glucose 105 82 - 115 mg/dL    Blood Urea Nitrogen 16.6 8.4 - 25.7 mg/dL    Creatinine 0.66 (L) 0.72 - 1.25 mg/dL    BUN/Creatinine Ratio 25     Calcium 9.5 8.8 - 10.0 mg/dL    Anion Gap 7.0 mEq/L    eGFR >60 mL/min/1.73/m2   CBC with Differential    Collection Time: 02/27/25  8:55 AM   Result Value Ref Range    WBC 7.29 4.50 - 11.50 x10(3)/mcL    RBC 3.19 (L) 4.70 - 6.10 x10(6)/mcL    Hgb 8.7 (L) 14.0 - 18.0 g/dL    Hct 28.1 (L) 42.0 - 52.0 %    MCV 88.1 80.0 - 94.0 fL    MCH 27.3 27.0 - 31.0 pg    MCHC 31.0 (L) 33.0 - 36.0 g/dL    RDW 18.5 (H) 11.5 - 17.0 %    Platelet 333 130 - 400 x10(3)/mcL    MPV 10.0 7.4 - 10.4 fL    Neut % 67.5 %    Lymph % 26.2 %    Mono % 4.8 %    Eos % 0.5 %    Basophil % 0.5 %    Imm Grans % 0.5 %    Neut # 4.91 2.1 - 9.2 x10(3)/mcL    Lymph # 1.91 0.6 - 4.6 x10(3)/mcL    Mono # 0.35 0.1 - 1.3 x10(3)/mcL    Eos # 0.04 0 - 0.9 x10(3)/mcL    Baso # 0.04 <=0.2 x10(3)/mcL    Imm Gran # 0.04 0.00 - 0.04 x10(3)/mcL    NRBC% 0.3 %   Lactic Acid, Plasma    Collection Time: 02/27/25  1:18 PM   Result Value Ref Range    Lactic Acid Level 1.3 0.5 - 2.2 mmol/L   Comprehensive Metabolic Panel    Collection Time: 02/28/25  3:55 AM   Result Value Ref Range    Sodium 143 136 - 145 mmol/L    Potassium 3.9 3.5 - 5.1 mmol/L    Chloride 115 (H) 98 - 107 mmol/L    CO2 22 (L) 23 - 31 mmol/L    Glucose 96 82 - 115 mg/dL    Blood Urea Nitrogen 16.4 8.4 - 25.7 mg/dL    Creatinine 0.59 (L) 0.72 - 1.25 mg/dL    Calcium 8.0 (L) 8.8 - 10.0 mg/dL    Protein Total 6.0 5.8 - 7.6 gm/dL    Albumin 2.1 (L) 3.4 - 4.8 g/dL     Globulin 3.9 (H) 2.4 - 3.5 gm/dL    Albumin/Globulin Ratio 0.5 (L) 1.1 - 2.0 ratio    Bilirubin Total 0.9 <=1.5 mg/dL    ALP 73 40 - 150 unit/L    ALT 9 0 - 55 unit/L    AST 11 5 - 34 unit/L    eGFR >60 mL/min/1.73/m2    Anion Gap 6.0 mEq/L    BUN/Creatinine Ratio 28    Magnesium    Collection Time: 02/28/25  3:55 AM   Result Value Ref Range    Magnesium Level 2.00 1.60 - 2.60 mg/dL   Phosphorus    Collection Time: 02/28/25  3:55 AM   Result Value Ref Range    Phosphorus Level 3.0 2.3 - 4.7 mg/dL   CBC with Differential    Collection Time: 02/28/25  3:55 AM   Result Value Ref Range    WBC 6.37 4.50 - 11.50 x10(3)/mcL    RBC 2.97 (L) 4.70 - 6.10 x10(6)/mcL    Hgb 8.2 (L) 14.0 - 18.0 g/dL    Hct 26.6 (L) 42.0 - 52.0 %    MCV 89.6 80.0 - 94.0 fL    MCH 27.6 27.0 - 31.0 pg    MCHC 30.8 (L) 33.0 - 36.0 g/dL    RDW 19.4 (H) 11.5 - 17.0 %    Platelet 276 130 - 400 x10(3)/mcL    MPV 10.0 7.4 - 10.4 fL    Neut % 67.6 %    Lymph % 21.5 %    Mono % 7.8 %    Eos % 1.9 %    Basophil % 0.6 %    Imm Grans % 0.6 %    Neut # 4.30 2.1 - 9.2 x10(3)/mcL    Lymph # 1.37 0.6 - 4.6 x10(3)/mcL    Mono # 0.50 0.1 - 1.3 x10(3)/mcL    Eos # 0.12 0 - 0.9 x10(3)/mcL    Baso # 0.04 <=0.2 x10(3)/mcL    Imm Gran # 0.04 0.00 - 0.04 x10(3)/mcL    NRBC% 0.0 %       Imaging:  CT Chest Abdomen Pelvis With IV Contrast (XPD) NO Oral Contrast  Result Date: 2/27/2025  EXAMINATION: CT CHEST ABDOMEN PELVIS WITH IV CONTRAST (XPD) CLINICAL HISTORY: Hematemesis; TECHNIQUE: Low dose axial images, sagittal and coronal reformations were obtained from the thoracic inlet through the pelvis following the IV administration of 100 mL of Omnipaque 350. Dose reduction techniques including automatic exposure control (AEC) were utilized. Dose (DLP): 816 mGycm COMPARISON: Chest radiograph and abdominal radiograph from the same day.  CT chest, abdomen, and pelvis with IV contrast from 11/12/2024. FINDINGS: Tracheostomy tube in appropriate position.  Linear low-density  within the tracheal lumen along the posterior margin of the tracheostomy likely represents fluid secretion.  Small volume fluid secretions also noted within the left mainstem bronchus on series 6, image 47.  Tracheobronchial tree is patent.  Patchy ground-glass opacities are noted within the right middle lobe and right lower lobe.  Subsegmental atelectasis or scarring at the left lung base.  No pleural effusion or pneumothorax. Chronic enlargement of the left atrium.  No pericardial effusion.  Right chest wall AICD in place. Mild calcific atherosclerosis of the thoracic and abdominal aorta.  No evidence of aortic aneurysm or dissection. No evidence of pulmonary artery embolism. No evidence of mediastinal or hilar lymphadenopathy. No free intraperitoneal fluid or gas. Liver is normal in size and attenuation with no evidence of focal lesion.  No intrahepatic or extrahepatic biliary ductal dilatation. Normal appearance of the pancreas. Normal appearance of the adrenal glands. Normal appearance of the spleen. Both kidneys contain simple cysts.  No nephrolithiasis or hydronephrosis.  The ureters are normal.  There is a Fernandez catheter within the bladder lumen.  There is marked circumferential thickening of the bladder wall which is nonspecific and could be secondary to either Fernandez catheter decompression and/or cystitis.  Curvilinear calcifications noted within the prostate gland. Esophagus is normal.  Percutaneous gastrostomy tube with balloon tip within the gastric lumen.  The tip of the catheter terminates in the transverse portion of the duodenum.  No evidence of bowel obstruction.  Postsurgical sutures are noted in loops of small bowel in the right lower quadrant.  Appendix is normal.  Stool is noted throughout the colon with no evidence of acute colonic abnormality. No evidence of mesenteric, pelvic, or inguinal lymphadenopathy. Interval worsening of a sacral decubitus ulcer when compared to 11/12/2024.  Portions of  the superior coccyx are no longer visible, concerning for osteomyelitis.  Gas containing fluid collection noted posterior to the sacrum measuring 2.4 cm x 1.5 cm, cannot exclude an abscess (series 6, image 172).  Worsening increased density within the presacral space. Unchanged exuberant heterotopic ossification noted adjacent to the left iliac wing and anterolateral to the left hip joint. Bilateral gynecomastia.     Interval worsening of a sacral decubitus ulcer with findings suspicious for osteomyelitis of the coccyx.  There is also suggestion of a gas containing fluid collection in the subcutaneous fat superficial to the sacrum measuring 2.4 cm x 1.5 cm, concerning for abscess formation.  Findings could be better evaluated with MRI sacrum/coccyx without and with IV contrast. Patchy ground-glass opacities in the right middle and lower lobes which are nonspecific and could represent either edema or atypical pneumonia. Subsegmental atelectasis of the left lung base. Marked circumferential thickening of the bladder wall which is decompressed by Fernandez catheter.  Appearance is nonspecific and could be secondary to Fernandez catheter decompression and/or superimposed cystitis.  Correlate with urinalysis. Discrepancy between preliminary and final reports was reported to Dr. Salcido at 09:36 on 02/27/2025. Electronically signed by: Anjel Lambert Date:    02/27/2025 Time:    09:38    X-Ray Chest AP Portable  Result Date: 2/26/2025  EXAMINATION XR CHEST AP PORTABLE CLINICAL HISTORY Anemia, unspecified TECHNIQUE A total of 1 frontal image(s) submitted of the chest. COMPARISON 6 January 2025 FINDINGS Lines/tubes/devices: Right chest wall pacemaker/ICD is unchanged.  Tracheostomy tube remains in similar position.  Multiple ECG leads overlie the chest. The cardiac silhouette and central vascular structures are unchanged.  The trachea is midline. Evaluation of the left mid and lower lung field is limited secondary to superimposed  artifact from the patient's hand overlying the chest.  Within limitations, no convincing new or worsened airspace consolidation is identified.  There is no large pleural effusion or convincing pneumothorax. Regional osseous structures and extrathoracic soft tissues are similar. IMPRESSION No acute process or other adverse interval change identified within technical limitations discussed above. Electronically signed by: Isaac Carcamo Date:    02/26/2025 Time:    21:52    X-Ray Abdomen AP 1 View  Result Date: 2/26/2025  EXAMINATION: XR ABDOMEN AP 1 VIEW CLINICAL HISTORY: Gastrostomy status TECHNIQUE: AP X-RAY OF THE ABDOMEN: CPT 67824 FINDINGS: Examination reveals gaseous distension of the abdomen with gas in loops of large and small bowel some residual feces identified throughout the colon the gas pattern is nonspecific with no clear evidence of ileus or obstruction no abnormal masses identified there are rotatory scoliotic changes of the lumbar spine with significant chronic changes related to the left hip with significant heterotrophic bone formation     Gaseous distension of the abdomen with no other significant abnormalities identified. Changes in the left hip Electronically signed by: Vik Keen Date:    02/26/2025 Time:    11:56         Assessment/Plan:  69 y.o. AA male known to Dr. 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  Patient with multiple prior admissions during which our group was consulted for similar complaints of coffee-ground emesis and tube feed intolerance.  Also has had prior aspiration pneumonia.  s/p PEG exchange for G-J tube extension 10/2024.  Here with anemia on outpatient labs.      Acute on chronic anemia   Hgb  7.1--2u prbcs -- 8.7 -- 8.2  Baseline hgb around 8 as of late.  FOBT negative x1 and positive x2 1/2025.  No overt GIB. Brown stool at present.   EGD 10/2024 with no evidence for anemia.   Colon in 2018 ok.    Likely component of AOCD in light of chronic osteo.     2.  Vomiting   Tube appears in appropriate position.  No acute findings on CT in the GI tract to explain.       3.  H/O neuroendocrine SB tumor with resection      -Continue ppi iv 40 mg bid.  -Monitor H/H and transfuse as needed to Hgb 7  -Monitor stools for bleeding  -F/U SBS  -f/u chromogranin A and 24 hour urine SHERIDAN Singh PA-C

## 2025-03-01 LAB
ALBUMIN SERPL-MCNC: 2.3 G/DL (ref 3.4–4.8)
ALBUMIN/GLOB SERPL: 0.6 RATIO (ref 1.1–2)
ALP SERPL-CCNC: 79 UNIT/L (ref 40–150)
ALT SERPL-CCNC: 8 UNIT/L (ref 0–55)
ANION GAP SERPL CALC-SCNC: 7 MEQ/L
AST SERPL-CCNC: 15 UNIT/L (ref 5–34)
BACTERIA UR CULT: ABNORMAL
BACTERIA UR CULT: ABNORMAL
BASOPHILS # BLD AUTO: 0.03 X10(3)/MCL
BASOPHILS NFR BLD AUTO: 0.5 %
BILIRUB SERPL-MCNC: 1.3 MG/DL
BUN SERPL-MCNC: 17.6 MG/DL (ref 8.4–25.7)
CALCIUM SERPL-MCNC: 8.2 MG/DL (ref 8.8–10)
CHLORIDE SERPL-SCNC: 119 MMOL/L (ref 98–107)
CO2 SERPL-SCNC: 21 MMOL/L (ref 23–31)
CREAT SERPL-MCNC: 0.59 MG/DL (ref 0.72–1.25)
CREAT/UREA NIT SERPL: 30
EOSINOPHIL # BLD AUTO: 0.1 X10(3)/MCL (ref 0–0.9)
EOSINOPHIL NFR BLD AUTO: 1.7 %
ERYTHROCYTE [DISTWIDTH] IN BLOOD BY AUTOMATED COUNT: 20.1 % (ref 11.5–17)
GFR SERPLBLD CREATININE-BSD FMLA CKD-EPI: >60 ML/MIN/1.73/M2
GLOBULIN SER-MCNC: 4.1 GM/DL (ref 2.4–3.5)
GLUCOSE SERPL-MCNC: 80 MG/DL (ref 82–115)
HCT VFR BLD AUTO: 28.9 % (ref 42–52)
HGB BLD-MCNC: 8.4 G/DL (ref 14–18)
IMM GRANULOCYTES # BLD AUTO: 0.04 X10(3)/MCL (ref 0–0.04)
IMM GRANULOCYTES NFR BLD AUTO: 0.7 %
LYMPHOCYTES # BLD AUTO: 0.94 X10(3)/MCL (ref 0.6–4.6)
LYMPHOCYTES NFR BLD AUTO: 16.4 %
MAGNESIUM SERPL-MCNC: 1.9 MG/DL (ref 1.6–2.6)
MCH RBC QN AUTO: 27.1 PG (ref 27–31)
MCHC RBC AUTO-ENTMCNC: 29.1 G/DL (ref 33–36)
MCV RBC AUTO: 93.2 FL (ref 80–94)
MONOCYTES # BLD AUTO: 0.47 X10(3)/MCL (ref 0.1–1.3)
MONOCYTES NFR BLD AUTO: 8.2 %
NEUTROPHILS # BLD AUTO: 4.15 X10(3)/MCL (ref 2.1–9.2)
NEUTROPHILS NFR BLD AUTO: 72.5 %
NRBC BLD AUTO-RTO: 0 %
PHOSPHATE SERPL-MCNC: 2.8 MG/DL (ref 2.3–4.7)
PLATELET # BLD AUTO: 248 X10(3)/MCL (ref 130–400)
PMV BLD AUTO: 10.2 FL (ref 7.4–10.4)
POTASSIUM SERPL-SCNC: 3.1 MMOL/L (ref 3.5–5.1)
PROT SERPL-MCNC: 6.4 GM/DL (ref 5.8–7.6)
RBC # BLD AUTO: 3.1 X10(6)/MCL (ref 4.7–6.1)
SODIUM SERPL-SCNC: 147 MMOL/L (ref 136–145)
WBC # BLD AUTO: 5.73 X10(3)/MCL (ref 4.5–11.5)

## 2025-03-01 PROCEDURE — 25000003 PHARM REV CODE 250: Performed by: INTERNAL MEDICINE

## 2025-03-01 PROCEDURE — 84100 ASSAY OF PHOSPHORUS: CPT | Performed by: STUDENT IN AN ORGANIZED HEALTH CARE EDUCATION/TRAINING PROGRAM

## 2025-03-01 PROCEDURE — 63600175 PHARM REV CODE 636 W HCPCS

## 2025-03-01 PROCEDURE — 63600175 PHARM REV CODE 636 W HCPCS: Performed by: STUDENT IN AN ORGANIZED HEALTH CARE EDUCATION/TRAINING PROGRAM

## 2025-03-01 PROCEDURE — 63600175 PHARM REV CODE 636 W HCPCS: Performed by: INTERNAL MEDICINE

## 2025-03-01 PROCEDURE — 27100171 HC OXYGEN HIGH FLOW UP TO 24 HOURS

## 2025-03-01 PROCEDURE — 94760 N-INVAS EAR/PLS OXIMETRY 1: CPT

## 2025-03-01 PROCEDURE — 36415 COLL VENOUS BLD VENIPUNCTURE: CPT | Performed by: STUDENT IN AN ORGANIZED HEALTH CARE EDUCATION/TRAINING PROGRAM

## 2025-03-01 PROCEDURE — 99900026 HC AIRWAY MAINTENANCE (STAT)

## 2025-03-01 PROCEDURE — 85025 COMPLETE CBC W/AUTO DIFF WBC: CPT | Performed by: STUDENT IN AN ORGANIZED HEALTH CARE EDUCATION/TRAINING PROGRAM

## 2025-03-01 PROCEDURE — 94003 VENT MGMT INPAT SUBQ DAY: CPT

## 2025-03-01 PROCEDURE — 99900031 HC PATIENT EDUCATION (STAT)

## 2025-03-01 PROCEDURE — 80053 COMPREHEN METABOLIC PANEL: CPT | Performed by: STUDENT IN AN ORGANIZED HEALTH CARE EDUCATION/TRAINING PROGRAM

## 2025-03-01 PROCEDURE — 99900022

## 2025-03-01 PROCEDURE — 83735 ASSAY OF MAGNESIUM: CPT | Performed by: STUDENT IN AN ORGANIZED HEALTH CARE EDUCATION/TRAINING PROGRAM

## 2025-03-01 PROCEDURE — 94761 N-INVAS EAR/PLS OXIMETRY MLT: CPT

## 2025-03-01 PROCEDURE — 99900035 HC TECH TIME PER 15 MIN (STAT)

## 2025-03-01 PROCEDURE — 20000000 HC ICU ROOM

## 2025-03-01 PROCEDURE — 25000003 PHARM REV CODE 250: Performed by: STUDENT IN AN ORGANIZED HEALTH CARE EDUCATION/TRAINING PROGRAM

## 2025-03-01 PROCEDURE — 27200966 HC CLOSED SUCTION SYSTEM

## 2025-03-01 PROCEDURE — 27000207 HC ISOLATION

## 2025-03-01 RX ORDER — ONDANSETRON HYDROCHLORIDE 2 MG/ML
INJECTION, SOLUTION INTRAVENOUS
Status: COMPLETED
Start: 2025-03-01 | End: 2025-03-01

## 2025-03-01 RX ORDER — ONDANSETRON HYDROCHLORIDE 2 MG/ML
4 INJECTION, SOLUTION INTRAVENOUS EVERY 8 HOURS PRN
Status: DISCONTINUED | OUTPATIENT
Start: 2025-03-01 | End: 2025-03-05 | Stop reason: HOSPADM

## 2025-03-01 RX ADMIN — METOPROLOL TARTRATE 25 MG: 25 TABLET, FILM COATED ORAL at 08:03

## 2025-03-01 RX ADMIN — SUCRALFATE 1 G: 1 TABLET ORAL at 11:03

## 2025-03-01 RX ADMIN — BUSPIRONE HYDROCHLORIDE 5 MG: 5 TABLET ORAL at 08:03

## 2025-03-01 RX ADMIN — VENLAFAXINE 75 MG: 37.5 TABLET ORAL at 08:03

## 2025-03-01 RX ADMIN — BUSPIRONE HYDROCHLORIDE 5 MG: 5 TABLET ORAL at 02:03

## 2025-03-01 RX ADMIN — SODIUM CHLORIDE: 9 INJECTION, SOLUTION INTRAVENOUS at 02:03

## 2025-03-01 RX ADMIN — POTASSIUM BICARBONATE 50 MEQ: 978 TABLET, EFFERVESCENT ORAL at 08:03

## 2025-03-01 RX ADMIN — AMIODARONE HYDROCHLORIDE 200 MG: 200 TABLET ORAL at 08:03

## 2025-03-01 RX ADMIN — QUETIAPINE FUMARATE 25 MG: 25 TABLET ORAL at 08:03

## 2025-03-01 RX ADMIN — CHOLESTYRAMINE 4 GRAMS OF ANHYDROUS CHOLESTYRAMINE: 4 POWDER, FOR SUSPENSION ORAL at 09:03

## 2025-03-01 RX ADMIN — PANTOPRAZOLE SODIUM 40 MG: 40 INJECTION, POWDER, LYOPHILIZED, FOR SOLUTION INTRAVENOUS at 08:03

## 2025-03-01 RX ADMIN — MUPIROCIN: 20 OINTMENT TOPICAL at 08:03

## 2025-03-01 RX ADMIN — ATORVASTATIN CALCIUM 10 MG: 10 TABLET, FILM COATED ORAL at 08:03

## 2025-03-01 RX ADMIN — CEFEPIME 2 G: 2 INJECTION, POWDER, FOR SOLUTION INTRAVENOUS at 01:03

## 2025-03-01 RX ADMIN — CEFEPIME 2 G: 2 INJECTION, POWDER, FOR SOLUTION INTRAVENOUS at 08:03

## 2025-03-01 RX ADMIN — CEFEPIME 2 G: 2 INJECTION, POWDER, FOR SOLUTION INTRAVENOUS at 04:03

## 2025-03-01 RX ADMIN — CHOLESTYRAMINE 4 GRAMS OF ANHYDROUS CHOLESTYRAMINE: 4 POWDER, FOR SUSPENSION ORAL at 08:03

## 2025-03-01 RX ADMIN — FINASTERIDE 5 MG: 5 TABLET, FILM COATED ORAL at 08:03

## 2025-03-01 RX ADMIN — SUCRALFATE 1 G: 1 TABLET ORAL at 08:03

## 2025-03-01 RX ADMIN — SUCRALFATE 1 G: 1 TABLET ORAL at 06:03

## 2025-03-01 RX ADMIN — ONDANSETRON 4 MG: 2 INJECTION INTRAMUSCULAR; INTRAVENOUS at 04:03

## 2025-03-01 NOTE — PROGRESS NOTES
[facility] - 7th Floor ICU  Pulmonary/Critical Care  Progress Note  3/1/2025    Patient Name: Delio Daniel Jr.  MRN: [id number]  Admission Date: 2/26/2025  Code Status: Full Code      Subjective:     HPI:  The patient is a 69-year-old male nursing home resident at [facility] with a history of hemorrhagic stroke 2023, chronic atrial fibrillation, coronary artery disease, ventricular arrhythmias with high-degree AV block, post p.m./AICD, and hypertension.  He is reported as nonverbal with dysphagia (PEG in place), chronic tracheostomy and mechanical ventilation since his stroke, chronic indwelling Fernandez, and is chronically bedridden.  He presented to ER 02/27/2025 with H/H of 7.1/24.6 that was noted on routine blood work.  He was transfused with 2 units of PRBCs.  CT of the chest revealed minimal patchy ground-glass infiltrates right middle and right lower lobe.    Hospital Course:      24hr Interval History:  He is afebrile.  Intake 2780 cc, output 3140 cc over the previous 24 hours.  Wound care service findings noted.  Tissue/bone cultures obtained, and debridement performed.  Blood and tissue cultures no growth, respiratory cultures with Gram-negative rods, id pending.  He is receiving IV cefepime.  No reported respiratory difficulty, scant secretions with minimal suctioned requirements.  No hypotension or arrhythmias.  Small-bowel follow-through study yesterday with no evidence of bowel obstruction.  He is receiving Peptamen 1.5 at 30 cc/hour and 30 cc q.4h water flushes per PEG.  Liquid brown stool.  No reported GI bleeding.    SIMV 15/500 cc/PS +10/peep +5/30%    Scheduled Medications:   amiodarone  200 mg Per G Tube Daily    atorvastatin  10 mg Per G Tube Daily    busPIRone  5 mg Per G Tube TID    ceFEPime IV (PEDS and ADULTS)  2 g Intravenous Q8H    cholestyramine-aspartame  1 packet Per G Tube BID    finasteride  5 mg Per G Tube Daily    metoprolol  5 mg Intravenous Once    metoprolol tartrate  25  mg Per G Tube BID    mupirocin   Nasal BID    pantoprazole  40 mg Intravenous BID    QUEtiapine  25 mg Per G Tube BID    sucralfate  1 g Per G Tube QID (AC & HS)    venlafaxine  75 mg Per G Tube BID     PRN Medications:    Current Facility-Administered Medications:     0.9%  NaCl infusion (for blood administration), , Intravenous, Q24H PRN    albuterol-ipratropium, 3 mL, Nebulization, Q6H PRN    labetalol, 10 mg, Intravenous, Q2H PRN    sodium chloride 0.9%, 10 mL, Intravenous, PRN  Continuous Infusions:   0.9% NaCl   Intravenous Continuous 100 mL/hr at 03/01/25 0622 Rate Verify at 03/01/25 0622       Past Medical History:   Diagnosis Date    Arthritis     Atrial fibrillation     BPH (benign prostatic hyperplasia)     Cardiac arrest     Coronary artery disease     Cyst, kidney, acquired     Diverticulosis     Hyperlipidemia     Hypertension     MI (myocardial infarction)     Obesity     Steatosis of liver     Stroke        Past Surgical History:   Procedure Laterality Date    A-V CARDIAC PACEMAKER INSERTION Right     CARDIAC CATHETERIZATION      COLONOSCOPY W/ BIOPSIES      CRANIECTOMY Right 12/20/2023    Procedure: CRANIECTOMY;  Surgeon: Artem Can MD;  Location: [facility];  Service: Neurosurgery;  Laterality: Right;    ESOPHAGOGASTRODUODENOSCOPY W/ PEG N/A 1/2/2024    Procedure: PEG;  Surgeon: Tani Day MD;  Location: [facility] ENDOSCOPY;  Service: Gastroenterology;  Laterality: N/A;    ESOPHAGOGASTRODUODENOSCOPY W/ PEG N/A 10/30/2024    Procedure: EGD w/ Jtube placement;  Surgeon: Gabriele Salcido MD;  Location: [facility] ENDOSCOPY;  Service: Endoscopy;  Laterality: N/A;  with J tube extension    excision of colon      TRACHEOSTOMY N/A 12/29/2023    Procedure: CREATION, TRACHEOSTOMY;  Surgeon: Patricia Winslow MD;  Location: [facility];  Service: ENT;  Laterality: N/A;  REQ 1130 //  NEEDS 2 SCRUBS       Objective:     Input/output:    Intake/Output Summary (Last 24 hours) at 3/1/2025 0646  Last data  filed at 3/1/2025 0622  Gross per 24 hour   Intake 2781.53 ml   Output 3140 ml   Net -358.47 ml       Vital Signs (Most Recent):  Temp: 97.6 °F (36.4 °C) (03/01/25 0400)  Pulse: 91 (03/01/25 0600)  Resp: 15 (03/01/25 0600)  BP: (!) 131/100 (03/01/25 0600)  SpO2: 100 % (03/01/25 0600)  Body mass index is 23.72 kg/m².  Weight: 68.7 kg (151 lb 7.3 oz) Vital Signs (24h Range):  Temp:  [97.6 °F (36.4 °C)-98.5 °F (36.9 °C)] 97.6 °F (36.4 °C)  Pulse:  [] 91  Resp:  [0-17] 15  SpO2:  [89 %-100 %] 100 %  BP: (103-142)/() 131/100     Physical Exam  Constitutional:       Comments: Awake, nonverbal, does not follow commands, appears comfortable on mechanical ventilation   Neck:      Comments: Trach in place with no evidence of infection or drainage  Pulmonary:      Effort: No respiratory distress.      Breath sounds: No wheezing, rhonchi or rales.   Abdominal:      General: There is no distension.      Palpations: Abdomen is soft.      Comments: PEG epigastric, no evidence of infection.   Musculoskeletal:      Right lower leg: No edema.      Left lower leg: No edema.      Comments: Flexion contractures upper and lower extremities bilateral   Neurological:      Comments: Nonverbal, no following of commands         Lines/Drains/Airways       Drain  Duration                  Gastrostomy/Enterostomy 10/30/24 1200 Gastrostomy-jejunostomy feeding 121 days         Urethral Catheter 02/27/25 0400 16 Fr. 2 days         Rectal Tube 02/28/25 0730 rectal tube w/ balloon (indicate number of mLs) <1 day              Airway  Duration             Adult Surgical Airway 08/19/24 0120 Wendi Extra Large Cuffed Distal 6.0/ 75mm 194 days              Peripheral Intravenous Line  Duration                  Midline Catheter - Double Lumen Right basilic vein (medial side of arm) -- days         Peripheral IV - Single Lumen 02/27/25 0500 18 G Yes Anterior;Proximal;Right Forearm 2 days                    Vent:  Vent Mode: SIMV (03/01/25  0530)  Set Rate: 15 BPM (03/01/25 0530)  Vt Set: 500 mL (03/01/25 0530)  Pressure Support: 10 cmH20 (03/01/25 0530)  PEEP/CPAP: 5 cmH20 (03/01/25 0530)  Oxygen Concentration (%): 30 (03/01/25 0530)  Peak Airway Pressure: 16 cmH20 (03/01/25 0530)  Total Ve: 7.5 L/m (03/01/25 0530)  F/VT Ratio<105 (RSBI): (!) 29.82 (02/28/25 2230)    ABGs:  Lab Results   Component Value Date    PH 7.480 (H) 01/07/2025    PO2 96.0 01/07/2025    PCO2 39.0 01/07/2025    QPC7OGB 32.6 (A) 01/17/2024    POCSATURATED 98 02/02/2024         Significant Labs:    Lab Results   Component Value Date    WBC 5.73 03/01/2025    HGB 8.4 (L) 03/01/2025    HCT 28.9 (L) 03/01/2025    MCV 93.2 03/01/2025     03/01/2025         Recent Labs   Lab 03/01/25  0405   *   K 3.1*   *   CO2 21*   BUN 17.6   CREATININE 0.59*   CALCIUM 8.2*   MG 1.90   PHOS 2.8   AST 15   ALT 8   ALKPHOS 79   ALBUMIN 2.3*     Imaging:   None this a.m.    Assessment:     Hemorrhagic stroke 2023, remains nonverbal and bedridden since that time, chronic nursing home resident [facility].  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  Coronary artery disease with previous history of high-degree AV block and VFib arrest, post permanent pacer/AICD  Reported daily vomiting episodes at nursing home over the past week, no further vomiting past 24 hours.  Chronic anemia, post transfusion 2 units PRBCs on arrival to ER.  No current evidence of ongoing active bleeding  Hypertension    Plan:     Continue current baseline mechanical ventilatory settings  Continue enteral feedings per PEG, GI following, increased free water replacement.  As per Wound Care recommendations, actively following patient.  Continue IV cefepime (day 2), await wound tissue/bone cultures       35 minutes of critical care was time spent  personally by me on the following activities: development of treatment plan with patient or surrogate and bedside caregivers, discussions with consultants, evaluation of patient's response to treatment, examination of patient, ordering and performing treatments and interventions, ordering and review of laboratory studies, ordering and review of radiographic studies, pulse oximetry, re-evaluation of patient's condition.  This patient demonstrates a high probability for further clinical decompensation due to ongoing critical illness.  Critical care time did not overlap with that of any other provider or involve time for any procedures.       Miriam Hutchins MD, [facility]  Pulmonary/Critical Care

## 2025-03-01 NOTE — PLAN OF CARE
Problem: Infection  Goal: Absence of Infection Signs and Symptoms  Outcome: Progressing     Problem: Adult Inpatient Plan of Care  Goal: Plan of Care Review  Outcome: Progressing  Goal: Patient-Specific Goal (Individualized)  Outcome: Progressing  Goal: Absence of Hospital-Acquired Illness or Injury  Outcome: Progressing  Goal: Optimal Comfort and Wellbeing  Outcome: Progressing  Goal: Readiness for Transition of Care  Outcome: Progressing     Problem: Sepsis/Septic Shock  Goal: Optimal Coping  Outcome: Progressing  Goal: Absence of Bleeding  Outcome: Progressing  Goal: Blood Glucose Level Within Targeted Range  Outcome: Progressing  Goal: Absence of Infection Signs and Symptoms  Outcome: Progressing  Goal: Optimal Nutrition Intake  Outcome: Progressing     Problem: Pneumonia  Goal: Fluid Balance  Outcome: Progressing  Goal: Resolution of Infection Signs and Symptoms  Outcome: Progressing  Goal: Effective Oxygenation and Ventilation  Outcome: Progressing     Problem: Wound  Goal: Optimal Coping  Outcome: Progressing  Goal: Optimal Functional Ability  Outcome: Progressing  Goal: Absence of Infection Signs and Symptoms  Outcome: Progressing  Goal: Improved Oral Intake  Outcome: Progressing  Goal: Optimal Pain Control and Function  Outcome: Progressing  Goal: Skin Health and Integrity  Outcome: Progressing  Goal: Optimal Wound Healing  Outcome: Progressing     Problem: Neutropenia  Goal: Absence of Infection  Outcome: Progressing     Problem: Skin Injury Risk Increased  Goal: Skin Health and Integrity  Outcome: Progressing     Problem: Fall Injury Risk  Goal: Absence of Fall and Fall-Related Injury  Outcome: Progressing

## 2025-03-01 NOTE — PROGRESS NOTES
Interval History:     Pt with SBFT that did not show any obstruction.     No further vomiting.      Pt on trickle feeds         ROS     Current Medications[1]    Review of patient's allergies indicates:  No Known Allergies          Objective:    Vital Signs (Most Recent):  Temp: 98.3 °F (36.8 °C) (03/01/25 1200)  Pulse: 82 (03/01/25 1500)  Resp: (!) 24 (03/01/25 1500)  BP: (!) 135/94 (03/01/25 1500)  SpO2: 100 % (03/01/25 1500) Vital Signs (24h Range):  Temp:  [97.6 °F (36.4 °C)-98.5 °F (36.9 °C)] 98.3 °F (36.8 °C)  Pulse:  [] 82  Resp:  [0-24] 24  SpO2:  [78 %-100 %] 100 %  BP: (103-142)/() 135/94     Weight: 68.7 kg (151 lb 7.3 oz)  Body mass index is 23.72 kg/m².    Intake/Output Summary (Last 24 hours) at 3/1/2025 1532  Last data filed at 3/1/2025 1300  Gross per 24 hour   Intake 3070.53 ml   Output 4040 ml   Net -969.47 ml           Physical Exam  Constitutional:       General: He is not in acute distress.     Appearance: He is ill-appearing (chronically).   HENT:      Head: Normocephalic and atraumatic.   Eyes:      General: No scleral icterus.  Cardiovascular:      Rate and Rhythm: Normal rate and regular rhythm.   Pulmonary:      Effort: Pulmonary effort is normal. No respiratory distress.      Comments: Trach on vent  Abdominal:      General: Bowel sounds are normal. There is no distension.      Palpations: Abdomen is soft. There is no mass.      Tenderness: There is no abdominal tenderness (seeminly non-tender). There is no guarding or rebound.      Comments: G-J in epigastrium/LUQ c/d/i with no TFs, external bumper at 3.5cm. Prior lap scar noted.   Musculoskeletal:      Right lower leg: No edema.      Left lower leg: No edema.      Comments: contracted   Skin:     General: Skin is warm and dry.      Coloration: Skin is not jaundiced.   Neurological:      Comments: Reportedly at bedside. Non-verbal. Seems to track.       Recent Lab Results         03/01/25  0405        Albumin/Globulin Ratio  0.6       Albumin 2.3       ALP 79       ALT 8       Anion Gap 7.0       AST 15       Baso # 0.03       Basophil % 0.5       BILIRUBIN TOTAL 1.3       BUN 17.6       BUN/CREAT RATIO 30       Calcium 8.2       Chloride 119       CO2 21       Creatinine 0.59       eGFR >60  Comment: Estimated GFR calculated using the CKD-EPI creatinine (2021) equation.       Eos # 0.10       Eos % 1.7       Globulin, Total 4.1       Glucose 80       Hematocrit 28.9       Hemoglobin 8.4       Immature Grans (Abs) 0.04       Immature Granulocytes 0.7       Lymph # 0.94       LYMPH % 16.4       Magnesium  1.90       MCH 27.1       MCHC 29.1       MCV 93.2       Mono # 0.47       Mono % 8.2       MPV 10.2       Neut # 4.15       Neut % 72.5       nRBC 0.0       Phosphorus Level 2.8       Platelet Count 248       Potassium 3.1       PROTEIN TOTAL 6.4       RBC 3.10       RDW 20.1       Sodium 147       WBC 5.73                        Assessment & Plan:     69 y.o. AA male known to Dr. 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  Patient with multiple prior admissions during which our group was consulted for similar complaints of coffee-ground emesis and tube feed intolerance.  Also has had prior aspiration pneumonia.  s/p PEG exchange for G-J tube extension 10/2024.  Here with anemia on outpatient labs.      Acute on chronic anemia   Hgb 7.1--2u prbcs -- 8.7 -- 8.2  Baseline hgb around 8 as of late.  FOBT negative x1 and positive x2 1/2025.  No overt GIB. Brown stool at present.   EGD 10/2024 with no evidence for anemia.   Colon in 2018 ok.    Likely component of AOCD in light of chronic osteo.     2.  Vomiting   Tube appears in appropriate position.  No acute findings on  CT in the GI tract to explain.      The SBFT did not show any obstruction.  This is reassuring.  This seems to be improving some.  Can slowly advance tube feeds back to goal.      3.  H/O neuroendocrine SB tumor with resection    4. Sacral debub with osteo--on cefepime.     5. Hemorrhagic stroke 2023--nonverbal and bedridden    6. Chronic resp failure        -Continue ppi iv 40 mg bid.  -Monitor H/H and transfuse as needed to Hgb 7  -Monitor stools for bleeding  -f/u chromogranin A and 24 hour urine THIAA                   [1]   Current Facility-Administered Medications   Medication Dose Route Frequency Provider Last Rate Last Admin    0.9% NaCl infusion   Intravenous Continuous Efrain Salcido MD   Stopped at 03/01/25 0840    albuterol-ipratropium 2.5 mg-0.5 mg/3 mL nebulizer solution 3 mL  3 mL Nebulization Q6H PRN Amina Dolan MD        amiodarone tablet 200 mg  200 mg Per G Tube Daily Aimna Dolan MD   200 mg at 03/01/25 0840    atorvastatin tablet 10 mg  10 mg Per G Tube Daily Amina Dolan MD   10 mg at 03/01/25 0840    busPIRone tablet 5 mg  5 mg Per G Tube TID Amina Dolan MD   5 mg at 03/01/25 1435    ceFEPIme injection 2 g  2 g Intravenous Q8H Jamil Hutchins Jr., MD, FCCP   2 g at 03/01/25 0842    cholestyramine-aspartame 4 gram packet 4 grams of anhydrous cholestyramine  1 packet Per G Tube BID Amina Dolan MD   4 grams of anhydrous cholestyramine at 03/01/25 0841    finasteride tablet 5 mg  5 mg Per G Tube Daily Amina Dolan MD   5 mg at 03/01/25 0840    labetaloL injection 10 mg  10 mg Intravenous Q2H PRN Estefania Goldberg MD        metoprolol injection 5 mg  5 mg Intravenous Once Efrain Salcido MD        metoprolol tartrate (LOPRESSOR) tablet 25 mg  25 mg Per G Tube BID Amina Dolan MD   25 mg at 03/01/25 0840    mupirocin 2 % ointment   Nasal BID Efrain Salcido MD   Given at 03/01/25 0841    pantoprazole injection 40 mg  40 mg Intravenous BID Amina Dolan MD   40 mg at 03/01/25 0810     QUEtiapine tablet 25 mg  25 mg Per G Tube BID Amina Dolan MD   25 mg at 03/01/25 0840    sodium chloride 0.9% flush 10 mL  10 mL Intravenous PRN Amina Dolan MD        sucralfate tablet 1 g  1 g Per G Tube QID (AC & HS) Amina Dolan MD   1 g at 03/01/25 1117    venlafaxine tablet 75 mg  75 mg Per G Tube BID Amina Dolan MD   75 mg at 03/01/25 0840

## 2025-03-02 LAB
ANION GAP SERPL CALC-SCNC: 9 MEQ/L
BACTERIA SPEC CULT: ABNORMAL
BUN SERPL-MCNC: 17.5 MG/DL (ref 8.4–25.7)
CALCIUM SERPL-MCNC: 8.4 MG/DL (ref 8.8–10)
CHLORIDE SERPL-SCNC: 116 MMOL/L (ref 98–107)
CO2 SERPL-SCNC: 21 MMOL/L (ref 23–31)
CREAT SERPL-MCNC: 0.59 MG/DL (ref 0.72–1.25)
CREAT/UREA NIT SERPL: 30
GFR SERPLBLD CREATININE-BSD FMLA CKD-EPI: >60 ML/MIN/1.73/M2
GLUCOSE SERPL-MCNC: 73 MG/DL (ref 82–115)
GRAM STN SPEC: ABNORMAL
MAGNESIUM SERPL-MCNC: 1.8 MG/DL (ref 1.6–2.6)
POTASSIUM SERPL-SCNC: 2.9 MMOL/L (ref 3.5–5.1)
SODIUM SERPL-SCNC: 146 MMOL/L (ref 136–145)

## 2025-03-02 PROCEDURE — 25000003 PHARM REV CODE 250: Performed by: INTERNAL MEDICINE

## 2025-03-02 PROCEDURE — 94760 N-INVAS EAR/PLS OXIMETRY 1: CPT

## 2025-03-02 PROCEDURE — 99900022

## 2025-03-02 PROCEDURE — 36415 COLL VENOUS BLD VENIPUNCTURE: CPT | Performed by: INTERNAL MEDICINE

## 2025-03-02 PROCEDURE — 99900035 HC TECH TIME PER 15 MIN (STAT)

## 2025-03-02 PROCEDURE — 27200966 HC CLOSED SUCTION SYSTEM

## 2025-03-02 PROCEDURE — 80048 BASIC METABOLIC PNL TOTAL CA: CPT | Performed by: INTERNAL MEDICINE

## 2025-03-02 PROCEDURE — 99900026 HC AIRWAY MAINTENANCE (STAT)

## 2025-03-02 PROCEDURE — 63600175 PHARM REV CODE 636 W HCPCS: Performed by: STUDENT IN AN ORGANIZED HEALTH CARE EDUCATION/TRAINING PROGRAM

## 2025-03-02 PROCEDURE — 27100171 HC OXYGEN HIGH FLOW UP TO 24 HOURS

## 2025-03-02 PROCEDURE — 25000003 PHARM REV CODE 250: Performed by: STUDENT IN AN ORGANIZED HEALTH CARE EDUCATION/TRAINING PROGRAM

## 2025-03-02 PROCEDURE — 83735 ASSAY OF MAGNESIUM: CPT | Performed by: INTERNAL MEDICINE

## 2025-03-02 PROCEDURE — 94761 N-INVAS EAR/PLS OXIMETRY MLT: CPT

## 2025-03-02 PROCEDURE — 20000000 HC ICU ROOM

## 2025-03-02 PROCEDURE — 27000207 HC ISOLATION

## 2025-03-02 PROCEDURE — 63600175 PHARM REV CODE 636 W HCPCS: Performed by: INTERNAL MEDICINE

## 2025-03-02 PROCEDURE — 99900031 HC PATIENT EDUCATION (STAT)

## 2025-03-02 PROCEDURE — 94003 VENT MGMT INPAT SUBQ DAY: CPT

## 2025-03-02 RX ORDER — POTASSIUM CHLORIDE 14.9 MG/ML
20 INJECTION INTRAVENOUS
Status: COMPLETED | OUTPATIENT
Start: 2025-03-02 | End: 2025-03-02

## 2025-03-02 RX ADMIN — PANTOPRAZOLE SODIUM 40 MG: 40 INJECTION, POWDER, LYOPHILIZED, FOR SOLUTION INTRAVENOUS at 08:03

## 2025-03-02 RX ADMIN — AMIODARONE HYDROCHLORIDE 200 MG: 200 TABLET ORAL at 08:03

## 2025-03-02 RX ADMIN — QUETIAPINE FUMARATE 25 MG: 25 TABLET ORAL at 08:03

## 2025-03-02 RX ADMIN — CHOLESTYRAMINE 4 GRAMS OF ANHYDROUS CHOLESTYRAMINE: 4 POWDER, FOR SUSPENSION ORAL at 09:03

## 2025-03-02 RX ADMIN — METOPROLOL TARTRATE 25 MG: 25 TABLET, FILM COATED ORAL at 08:03

## 2025-03-02 RX ADMIN — BUSPIRONE HYDROCHLORIDE 5 MG: 5 TABLET ORAL at 03:03

## 2025-03-02 RX ADMIN — ATORVASTATIN CALCIUM 10 MG: 10 TABLET, FILM COATED ORAL at 08:03

## 2025-03-02 RX ADMIN — MUPIROCIN: 20 OINTMENT TOPICAL at 08:03

## 2025-03-02 RX ADMIN — CEFEPIME 2 G: 2 INJECTION, POWDER, FOR SOLUTION INTRAVENOUS at 04:03

## 2025-03-02 RX ADMIN — POTASSIUM BICARBONATE 40 MEQ: 391 TABLET, EFFERVESCENT ORAL at 08:03

## 2025-03-02 RX ADMIN — BUSPIRONE HYDROCHLORIDE 5 MG: 5 TABLET ORAL at 08:03

## 2025-03-02 RX ADMIN — SUCRALFATE 1 G: 1 TABLET ORAL at 08:03

## 2025-03-02 RX ADMIN — SUCRALFATE 1 G: 1 TABLET ORAL at 05:03

## 2025-03-02 RX ADMIN — CEFEPIME 2 G: 2 INJECTION, POWDER, FOR SOLUTION INTRAVENOUS at 12:03

## 2025-03-02 RX ADMIN — CHOLESTYRAMINE 4 GRAMS OF ANHYDROUS CHOLESTYRAMINE: 4 POWDER, FOR SUSPENSION ORAL at 08:03

## 2025-03-02 RX ADMIN — VENLAFAXINE 75 MG: 37.5 TABLET ORAL at 08:03

## 2025-03-02 RX ADMIN — POTASSIUM CHLORIDE 20 MEQ: 14.9 INJECTION, SOLUTION INTRAVENOUS at 10:03

## 2025-03-02 RX ADMIN — FINASTERIDE 5 MG: 5 TABLET, FILM COATED ORAL at 08:03

## 2025-03-02 RX ADMIN — POTASSIUM CHLORIDE 20 MEQ: 14.9 INJECTION, SOLUTION INTRAVENOUS at 12:03

## 2025-03-02 RX ADMIN — POTASSIUM CHLORIDE 20 MEQ: 14.9 INJECTION, SOLUTION INTRAVENOUS at 08:03

## 2025-03-02 RX ADMIN — CEFEPIME 2 G: 2 INJECTION, POWDER, FOR SOLUTION INTRAVENOUS at 08:03

## 2025-03-02 NOTE — PLAN OF CARE
Problem: Infection  Goal: Absence of Infection Signs and Symptoms  Outcome: Progressing     Problem: Adult Inpatient Plan of Care  Goal: Plan of Care Review  Outcome: Progressing  Goal: Patient-Specific Goal (Individualized)  Outcome: Progressing  Goal: Absence of Hospital-Acquired Illness or Injury  Outcome: Progressing  Goal: Optimal Comfort and Wellbeing  Outcome: Progressing  Goal: Readiness for Transition of Care  Outcome: Progressing     Problem: Sepsis/Septic Shock  Goal: Optimal Coping  Outcome: Progressing  Goal: Absence of Bleeding  Outcome: Progressing  Goal: Blood Glucose Level Within Targeted Range  Outcome: Progressing  Goal: Absence of Infection Signs and Symptoms  Outcome: Progressing  Goal: Optimal Nutrition Intake  Outcome: Progressing     Problem: Pneumonia  Goal: Fluid Balance  Outcome: Progressing  Goal: Resolution of Infection Signs and Symptoms  Outcome: Progressing  Goal: Effective Oxygenation and Ventilation  Outcome: Progressing     Problem: Wound  Goal: Optimal Coping  Outcome: Progressing  Goal: Optimal Functional Ability  Outcome: Progressing  Goal: Absence of Infection Signs and Symptoms  Outcome: Progressing  Goal: Optimal Pain Control and Function  Outcome: Progressing  Goal: Skin Health and Integrity  Outcome: Progressing  Goal: Optimal Wound Healing  Outcome: Progressing     Problem: Neutropenia  Goal: Absence of Infection  Outcome: Progressing     Problem: Skin Injury Risk Increased  Goal: Skin Health and Integrity  Outcome: Progressing     Problem: Fall Injury Risk  Goal: Absence of Fall and Fall-Related Injury  Outcome: Progressing

## 2025-03-02 NOTE — PROGRESS NOTES
[facility] - 7th Floor ICU  Pulmonary/Critical Care  Progress Note  3/2/2025    Patient Name: Delio Daniel Jr.  MRN: [id number]  Admission Date: 2/26/2025  Code Status: Full Code      Subjective:     HPI:  The patient is a 69-year-old male nursing home resident at [facility] with a history of hemorrhagic stroke 2023, chronic atrial fibrillation, coronary artery disease, ventricular arrhythmias with high-degree AV block, post p.m./AICD, and hypertension.  He is reported as nonverbal with dysphagia (PEG in place), chronic tracheostomy and mechanical ventilation since his stroke, chronic indwelling Fernandez, and is chronically bedridden.  He presented to ER 02/27/2025 with H/H of 7.1/24.6 that was noted on routine blood work.  He was transfused with 2 units of PRBCs.  CT of the chest revealed minimal patchy ground-glass infiltrates right middle and right lower lobe.    Hospital Course:      24hr Interval History:  He is afebrile.  Intake 2000 cc, output 1900 cc over the previous 24 hours.  Wound care service findings noted.  Decubitus tissue cultures growing many Gram-negative rods, id pending.  Respiratory cultures growing Gram-negative rods, possible Pseudomonas (final ID pending).  Blood cultures no growth.  He remains on IV cefepime.  No reported respiratory difficulty, scant secretions with minimal suctioned requirements.  No hypotension or arrhythmias. He is receiving Peptamen 1.5 at 30 cc/hour and 150 cc q.4h water flushes per PEG.  Liquid brown stool.  No reported vomiting.    SIMV 15/500 cc/PS +10/peep +5/30%    Scheduled Medications:   amiodarone  200 mg Per G Tube Daily    atorvastatin  10 mg Per G Tube Daily    busPIRone  5 mg Per G Tube TID    ceFEPime IV (PEDS and ADULTS)  2 g Intravenous Q8H    cholestyramine-aspartame  1 packet Per G Tube BID    finasteride  5 mg Per G Tube Daily    metoprolol  5 mg Intravenous Once    metoprolol tartrate  25 mg Per G Tube BID    mupirocin   Nasal BID     pantoprazole  40 mg Intravenous BID    QUEtiapine  25 mg Per G Tube BID    sucralfate  1 g Per G Tube QID (AC & HS)    venlafaxine  75 mg Per G Tube BID     PRN Medications:    Current Facility-Administered Medications:     albuterol-ipratropium, 3 mL, Nebulization, Q6H PRN    labetalol, 10 mg, Intravenous, Q2H PRN    ondansetron, 4 mg, Intravenous, Q8H PRN    sodium chloride 0.9%, 10 mL, Intravenous, PRN  Continuous Infusions:   0.9% NaCl   Intravenous Continuous   Stopped at 03/01/25 0840       Past Medical History:   Diagnosis Date    Arthritis     Atrial fibrillation     BPH (benign prostatic hyperplasia)     Cardiac arrest     Coronary artery disease     Cyst, kidney, acquired     Diverticulosis     Hyperlipidemia     Hypertension     MI (myocardial infarction)     Obesity     Steatosis of liver     Stroke        Past Surgical History:   Procedure Laterality Date    A-V CARDIAC PACEMAKER INSERTION Right     CARDIAC CATHETERIZATION      COLONOSCOPY W/ BIOPSIES      CRANIECTOMY Right 12/20/2023    Procedure: CRANIECTOMY;  Surgeon: Artem Can MD;  Location: [facility];  Service: Neurosurgery;  Laterality: Right;    ESOPHAGOGASTRODUODENOSCOPY W/ PEG N/A 1/2/2024    Procedure: PEG;  Surgeon: Tani Day MD;  Location: [facility] ENDOSCOPY;  Service: Gastroenterology;  Laterality: N/A;    ESOPHAGOGASTRODUODENOSCOPY W/ PEG N/A 10/30/2024    Procedure: EGD w/ Jtube placement;  Surgeon: Gabriele Salcido MD;  Location: [facility] ENDOSCOPY;  Service: Endoscopy;  Laterality: N/A;  with J tube extension    excision of colon      TRACHEOSTOMY N/A 12/29/2023    Procedure: CREATION, TRACHEOSTOMY;  Surgeon: Patricia Winslow MD;  Location: [facility];  Service: ENT;  Laterality: N/A;  REQ 1130 //  NEEDS 2 SCRUBS       Objective:     Input/output:    Intake/Output Summary (Last 24 hours) at 3/2/2025 0631  Last data filed at 3/2/2025 0600  Gross per 24 hour   Intake 1197 ml   Output 1900 ml   Net -703 ml       Vital Signs  (Most Recent):  Temp: 98 °F (36.7 °C) (03/02/25 0400)  Pulse: 91 (03/02/25 0600)  Resp: (!) 22 (03/02/25 0600)  BP: (!) 133/93 (03/02/25 0600)  SpO2: 95 % (03/02/25 0600)  Body mass index is 23.72 kg/m².  Weight: 68.7 kg (151 lb 7.3 oz) Vital Signs (24h Range):  Temp:  [98 °F (36.7 °C)-98.6 °F (37 °C)] 98 °F (36.7 °C)  Pulse:  [] 91  Resp:  [11-25] 22  SpO2:  [78 %-100 %] 95 %  BP: (116-137)/() 133/93     Physical Exam  Constitutional:       Comments: Awake, nonverbal, does not follow commands, appears comfortable on mechanical ventilation   Neck:      Comments: Trach in place with no evidence of infection or drainage  Pulmonary:      Effort: No respiratory distress.      Breath sounds: No wheezing, rhonchi or rales.   Abdominal:      General: There is no distension.      Palpations: Abdomen is soft.      Comments: PEG epigastric, no evidence of infection.   Musculoskeletal:      Right lower leg: No edema.      Left lower leg: No edema.      Comments: Flexion contractures upper and lower extremities bilateral   Neurological:      Comments: He is awake, eyes open.  Nonverbal, no following of commands         Lines/Drains/Airways       Peripherally Inserted Central Catheter Line  Duration             PICC Double Lumen right basilic -- days              Drain  Duration                  Gastrostomy/Enterostomy 10/30/24 1200 Gastrostomy-jejunostomy feeding 122 days         Urethral Catheter 02/27/25 0400 16 Fr. 3 days         Rectal Tube 02/28/25 0730 rectal tube w/ balloon (indicate number of mLs) 1 day              Airway  Duration             Adult Surgical Airway 08/19/24 0120 Wendi Extra Large Cuffed Distal 6.0/ 75mm 195 days              Peripheral Intravenous Line  Duration                  Peripheral IV - Single Lumen 03/01/25 1400 20 G Yes Left;Posterior Forearm <1 day                    Vent:  Vent Mode: SIMV (03/02/25 0515)  Set Rate: 15 BPM (03/02/25 0515)  Vt Set: 500 mL (03/02/25  0515)  Pressure Support: 10 cmH20 (03/02/25 0515)  PEEP/CPAP: 5 cmH20 (03/02/25 0515)  Oxygen Concentration (%): 30 (03/02/25 0515)  Peak Airway Pressure: 15 cmH20 (03/02/25 0515)  Total Ve: 9.8 L/m (03/02/25 0515)  F/VT Ratio<105 (RSBI): (!) 41.32 (03/01/25 2248)    ABGs:  Lab Results   Component Value Date    PH 7.480 (H) 01/07/2025    PO2 96.0 01/07/2025    PCO2 39.0 01/07/2025    TEL6GRY 32.6 (A) 01/17/2024    POCSATURATED 98 02/02/2024         Significant Labs:    Lab Results   Component Value Date    WBC 5.73 03/01/2025    HGB 8.4 (L) 03/01/2025    HCT 28.9 (L) 03/01/2025    MCV 93.2 03/01/2025     03/01/2025         Recent Labs   Lab 03/02/25  0304   *   K 2.9*   *   CO2 21*   BUN 17.5   CREATININE 0.59*   CALCIUM 8.4*   MG 1.80     Imaging:   None this a.m.    Assessment:     Hemorrhagic stroke 2023, remains nonverbal and bedridden since that time, chronic nursing home resident of [facility], on mechanical ventilation.  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  Coronary artery disease with previous history of high-degree AV block and VFib arrest, post permanent pacer/AICD  Reported daily vomiting episodes at nursing home over the past week, no further vomiting greater than 48 hours, tolerating PEG feedings.  Chronic anemia, post transfusion 2 units PRBCs on arrival to ER.  No evidence of active bleeding  Hypertension    Plan:     Continue current baseline mechanical ventilatory settings  Continue enteral feedings per PEG, GI following.  As per Wound Care recommendations, actively following patient.  Continue IV cefepime (day 3), await wound tissue/bone cultures       35 minutes of critical care was time spent personally by me on the following activities: development of treatment plan with patient or surrogate and bedside  caregivers, discussions with consultants, evaluation of patient's response to treatment, examination of patient, ordering and performing treatments and interventions, ordering and review of laboratory studies, ordering and review of radiographic studies, pulse oximetry, re-evaluation of patient's condition.  This patient demonstrates a high probability for further clinical decompensation due to ongoing critical illness.  Critical care time did not overlap with that of any other provider or involve time for any procedures.       Miriam Hutchins MD, [facility]  Pulmonary/Critical Care

## 2025-03-03 LAB
5OH-INDOLEACETATE 24H UR-MRATE: 3.2 MG/24 H
ANION GAP SERPL CALC-SCNC: 9 MEQ/L
BUN SERPL-MCNC: 12.9 MG/DL (ref 8.4–25.7)
CALCIUM SERPL-MCNC: 8.2 MG/DL (ref 8.8–10)
CHLORIDE SERPL-SCNC: 112 MMOL/L (ref 98–107)
CO2 SERPL-SCNC: 19 MMOL/L (ref 23–31)
COLLECT DURATION TIME UR: 24 H
CREAT SERPL-MCNC: 0.57 MG/DL (ref 0.72–1.25)
CREAT/UREA NIT SERPL: 23
GFR SERPLBLD CREATININE-BSD FMLA CKD-EPI: >60 ML/MIN/1.73/M2
GLUCOSE SERPL-MCNC: 68 MG/DL (ref 82–115)
MAGNESIUM SERPL-MCNC: 1.6 MG/DL (ref 1.6–2.6)
PHOSPHATE SERPL-MCNC: 2.3 MG/DL (ref 2.3–4.7)
POCT GLUCOSE: 63 MG/DL (ref 70–110)
POCT GLUCOSE: 76 MG/DL (ref 70–110)
POTASSIUM SERPL-SCNC: 3.4 MMOL/L (ref 3.5–5.1)
SODIUM SERPL-SCNC: 140 MMOL/L (ref 136–145)
SPECIMEN VOL 24H UR: 300 ML

## 2025-03-03 PROCEDURE — 94760 N-INVAS EAR/PLS OXIMETRY 1: CPT

## 2025-03-03 PROCEDURE — 63600175 PHARM REV CODE 636 W HCPCS

## 2025-03-03 PROCEDURE — 27200966 HC CLOSED SUCTION SYSTEM

## 2025-03-03 PROCEDURE — 27100171 HC OXYGEN HIGH FLOW UP TO 24 HOURS

## 2025-03-03 PROCEDURE — 25000003 PHARM REV CODE 250: Performed by: STUDENT IN AN ORGANIZED HEALTH CARE EDUCATION/TRAINING PROGRAM

## 2025-03-03 PROCEDURE — 36415 COLL VENOUS BLD VENIPUNCTURE: CPT | Performed by: INTERNAL MEDICINE

## 2025-03-03 PROCEDURE — 84100 ASSAY OF PHOSPHORUS: CPT | Performed by: INTERNAL MEDICINE

## 2025-03-03 PROCEDURE — 63600175 PHARM REV CODE 636 W HCPCS: Performed by: STUDENT IN AN ORGANIZED HEALTH CARE EDUCATION/TRAINING PROGRAM

## 2025-03-03 PROCEDURE — 99900031 HC PATIENT EDUCATION (STAT)

## 2025-03-03 PROCEDURE — 20000000 HC ICU ROOM

## 2025-03-03 PROCEDURE — 94003 VENT MGMT INPAT SUBQ DAY: CPT

## 2025-03-03 PROCEDURE — 83735 ASSAY OF MAGNESIUM: CPT | Performed by: INTERNAL MEDICINE

## 2025-03-03 PROCEDURE — 99900035 HC TECH TIME PER 15 MIN (STAT)

## 2025-03-03 PROCEDURE — 94799 UNLISTED PULMONARY SVC/PX: CPT

## 2025-03-03 PROCEDURE — 99900026 HC AIRWAY MAINTENANCE (STAT)

## 2025-03-03 PROCEDURE — 27000207 HC ISOLATION

## 2025-03-03 PROCEDURE — 63600175 PHARM REV CODE 636 W HCPCS: Performed by: INTERNAL MEDICINE

## 2025-03-03 PROCEDURE — 25000003 PHARM REV CODE 250: Performed by: INTERNAL MEDICINE

## 2025-03-03 PROCEDURE — 80048 BASIC METABOLIC PNL TOTAL CA: CPT | Performed by: INTERNAL MEDICINE

## 2025-03-03 PROCEDURE — 99900022

## 2025-03-03 PROCEDURE — 94761 N-INVAS EAR/PLS OXIMETRY MLT: CPT

## 2025-03-03 RX ORDER — IBUPROFEN 200 MG
16 TABLET ORAL
Status: DISCONTINUED | OUTPATIENT
Start: 2025-03-03 | End: 2025-03-05 | Stop reason: HOSPADM

## 2025-03-03 RX ORDER — GLUCAGON 1 MG
1 KIT INJECTION
Status: DISCONTINUED | OUTPATIENT
Start: 2025-03-03 | End: 2025-03-05 | Stop reason: HOSPADM

## 2025-03-03 RX ORDER — MAGNESIUM SULFATE HEPTAHYDRATE 40 MG/ML
2 INJECTION, SOLUTION INTRAVENOUS ONCE
Status: COMPLETED | OUTPATIENT
Start: 2025-03-03 | End: 2025-03-03

## 2025-03-03 RX ORDER — POTASSIUM CHLORIDE 14.9 MG/ML
20 INJECTION INTRAVENOUS
Status: COMPLETED | OUTPATIENT
Start: 2025-03-03 | End: 2025-03-03

## 2025-03-03 RX ORDER — DEXTROSE MONOHYDRATE 100 MG/ML
INJECTION, SOLUTION INTRAVENOUS CONTINUOUS
Status: DISCONTINUED | OUTPATIENT
Start: 2025-03-03 | End: 2025-03-03

## 2025-03-03 RX ORDER — IBUPROFEN 200 MG
24 TABLET ORAL
Status: DISCONTINUED | OUTPATIENT
Start: 2025-03-03 | End: 2025-03-05 | Stop reason: HOSPADM

## 2025-03-03 RX ADMIN — BUSPIRONE HYDROCHLORIDE 5 MG: 5 TABLET ORAL at 03:03

## 2025-03-03 RX ADMIN — MUPIROCIN: 20 OINTMENT TOPICAL at 08:03

## 2025-03-03 RX ADMIN — POTASSIUM CHLORIDE 20 MEQ: 14.9 INJECTION, SOLUTION INTRAVENOUS at 08:03

## 2025-03-03 RX ADMIN — VENLAFAXINE 75 MG: 37.5 TABLET ORAL at 08:03

## 2025-03-03 RX ADMIN — CEFEPIME 2 G: 2 INJECTION, POWDER, FOR SOLUTION INTRAVENOUS at 07:03

## 2025-03-03 RX ADMIN — DEXTROSE MONOHYDRATE 12.5 G: 25 INJECTION, SOLUTION INTRAVENOUS at 05:03

## 2025-03-03 RX ADMIN — CEFEPIME 2 G: 2 INJECTION, POWDER, FOR SOLUTION INTRAVENOUS at 11:03

## 2025-03-03 RX ADMIN — SUCRALFATE 1 G: 1 TABLET ORAL at 03:03

## 2025-03-03 RX ADMIN — BUSPIRONE HYDROCHLORIDE 5 MG: 5 TABLET ORAL at 07:03

## 2025-03-03 RX ADMIN — AMIODARONE HYDROCHLORIDE 200 MG: 200 TABLET ORAL at 07:03

## 2025-03-03 RX ADMIN — FINASTERIDE 5 MG: 5 TABLET, FILM COATED ORAL at 07:03

## 2025-03-03 RX ADMIN — SUCRALFATE 1 G: 1 TABLET ORAL at 12:03

## 2025-03-03 RX ADMIN — PANTOPRAZOLE SODIUM 40 MG: 40 INJECTION, POWDER, LYOPHILIZED, FOR SOLUTION INTRAVENOUS at 08:03

## 2025-03-03 RX ADMIN — CEFEPIME 2 G: 2 INJECTION, POWDER, FOR SOLUTION INTRAVENOUS at 03:03

## 2025-03-03 RX ADMIN — CEFEPIME 2 G: 2 INJECTION, POWDER, FOR SOLUTION INTRAVENOUS at 01:03

## 2025-03-03 RX ADMIN — METOPROLOL TARTRATE 25 MG: 25 TABLET, FILM COATED ORAL at 08:03

## 2025-03-03 RX ADMIN — QUETIAPINE FUMARATE 25 MG: 25 TABLET ORAL at 07:03

## 2025-03-03 RX ADMIN — PANTOPRAZOLE SODIUM 40 MG: 40 INJECTION, POWDER, LYOPHILIZED, FOR SOLUTION INTRAVENOUS at 07:03

## 2025-03-03 RX ADMIN — SUCRALFATE 1 G: 1 TABLET ORAL at 08:03

## 2025-03-03 RX ADMIN — QUETIAPINE FUMARATE 25 MG: 25 TABLET ORAL at 08:03

## 2025-03-03 RX ADMIN — CHOLESTYRAMINE 4 GRAMS OF ANHYDROUS CHOLESTYRAMINE: 4 POWDER, FOR SUSPENSION ORAL at 09:03

## 2025-03-03 RX ADMIN — DEXTROSE MONOHYDRATE: 100 INJECTION, SOLUTION INTRAVENOUS at 08:03

## 2025-03-03 RX ADMIN — POTASSIUM CHLORIDE 20 MEQ: 14.9 INJECTION, SOLUTION INTRAVENOUS at 10:03

## 2025-03-03 RX ADMIN — CHOLESTYRAMINE 4 GRAMS OF ANHYDROUS CHOLESTYRAMINE: 4 POWDER, FOR SUSPENSION ORAL at 07:03

## 2025-03-03 RX ADMIN — METOPROLOL TARTRATE 25 MG: 25 TABLET, FILM COATED ORAL at 07:03

## 2025-03-03 RX ADMIN — MAGNESIUM SULFATE HEPTAHYDRATE 2 G: 40 INJECTION, SOLUTION INTRAVENOUS at 05:03

## 2025-03-03 RX ADMIN — SODIUM CHLORIDE: 9 INJECTION, SOLUTION INTRAVENOUS at 04:03

## 2025-03-03 RX ADMIN — SUCRALFATE 1 G: 1 TABLET ORAL at 05:03

## 2025-03-03 RX ADMIN — BUSPIRONE HYDROCHLORIDE 5 MG: 5 TABLET ORAL at 08:03

## 2025-03-03 RX ADMIN — VENLAFAXINE 75 MG: 37.5 TABLET ORAL at 07:03

## 2025-03-03 RX ADMIN — LABETALOL HYDROCHLORIDE 10 MG: 5 INJECTION, SOLUTION INTRAVENOUS at 04:03

## 2025-03-03 RX ADMIN — ATORVASTATIN CALCIUM 10 MG: 10 TABLET, FILM COATED ORAL at 07:03

## 2025-03-03 NOTE — PROGRESS NOTES
"Gastroenterology Progress Note    Subjective/Interval History:  Currently J tube to LIWS.  TFs held.  Weekend events unclear.  Notes say no vomiting, however nurse reports patient vomited (unclear when or how much etc).  Was tolerating trickle TFs via J on Friday and Saturday as best I can tell.  He is having liquid brown stool via digni.   No episodes of vomiting so fat today.  Meds were administered via the G port this am and nurse reports suctioned trach after with only minimal secretions.      ROS:  Review of Systems   Unable to perform ROS: Other   Nonverbal. Trach on vent.     Vital Signs:  BP (!) 139/95 (BP Location: Left arm, Patient Position: Lying)   Pulse 103   Temp 98.1 °F (36.7 °C) (Oral)   Resp (!) 22   Ht 5' 7" (1.702 m)   Wt 68.7 kg (151 lb 7.3 oz)   SpO2 98%   BMI 23.72 kg/m²   Body mass index is 23.72 kg/m².    Physical Exam:  Physical Exam  Constitutional:       General: He is not in acute distress.     Appearance: He is ill-appearing (chronically).   HENT:      Head: Normocephalic and atraumatic.   Eyes:      General: No scleral icterus.  Cardiovascular:      Rate and Rhythm: Normal rate and regular rhythm.   Pulmonary:      Effort: Pulmonary effort is normal. No respiratory distress.      Comments: Trach on vent  Abdominal:      General: Bowel sounds are normal. There is no distension.      Palpations: Abdomen is soft. There is no mass.      Tenderness: There is no abdominal tenderness (seeminly non-tender). There is no guarding or rebound.      Comments: G-J in epigastrium/LUQ c/d/i with no TFs, external bumper at 3.5cm. Prior lap scar noted.   Musculoskeletal:      Right lower leg: No edema.      Left lower leg: No edema.      Comments: contracted   Skin:     General: Skin is warm and dry.      Coloration: Skin is not jaundiced.   Neurological:      Comments: Reportedly at baseline. Non-verbal. Seems to track.      Labs:  Recent Results (from the past 48 hours)   Basic Metabolic Panel "    Collection Time: 03/02/25  3:04 AM   Result Value Ref Range    Sodium 146 (H) 136 - 145 mmol/L    Potassium 2.9 (L) 3.5 - 5.1 mmol/L    Chloride 116 (H) 98 - 107 mmol/L    CO2 21 (L) 23 - 31 mmol/L    Glucose 73 (L) 82 - 115 mg/dL    Blood Urea Nitrogen 17.5 8.4 - 25.7 mg/dL    Creatinine 0.59 (L) 0.72 - 1.25 mg/dL    BUN/Creatinine Ratio 30     Calcium 8.4 (L) 8.8 - 10.0 mg/dL    Anion Gap 9.0 mEq/L    eGFR >60 mL/min/1.73/m2   Magnesium    Collection Time: 03/02/25  3:04 AM   Result Value Ref Range    Magnesium Level 1.80 1.60 - 2.60 mg/dL   Basic Metabolic Panel    Collection Time: 03/03/25  2:51 AM   Result Value Ref Range    Sodium 140 136 - 145 mmol/L    Potassium 3.4 (L) 3.5 - 5.1 mmol/L    Chloride 112 (H) 98 - 107 mmol/L    CO2 19 (L) 23 - 31 mmol/L    Glucose 68 (L) 82 - 115 mg/dL    Blood Urea Nitrogen 12.9 8.4 - 25.7 mg/dL    Creatinine 0.57 (L) 0.72 - 1.25 mg/dL    BUN/Creatinine Ratio 23     Calcium 8.2 (L) 8.8 - 10.0 mg/dL    Anion Gap 9.0 mEq/L    eGFR >60 mL/min/1.73/m2   Magnesium    Collection Time: 03/03/25  2:51 AM   Result Value Ref Range    Magnesium Level 1.60 1.60 - 2.60 mg/dL   Phosphorus    Collection Time: 03/03/25  2:51 AM   Result Value Ref Range    Phosphorus Level 2.3 2.3 - 4.7 mg/dL   POCT glucose    Collection Time: 03/03/25  4:39 AM   Result Value Ref Range    POCT Glucose 63 (L) 70 - 110 mg/dL   POCT glucose    Collection Time: 03/03/25  8:34 AM   Result Value Ref Range    POCT Glucose 76 70 - 110 mg/dL       Imaging:  XR Small Bowel Follow Through  Result Date: 2/28/2025  EXAMINATION: XR SMALL BOWEL FOLLOW THROUGH CLINICAL HISTORY: SBO eval. hx of SB carcinoid and prior SBO. Now vomiting.; TECHNIQUE: Small bowel series with serial radiographs.  240 mL Gastroview was administered via G-tube.  No fluoroscopy. COMPARISON: CT chest abdomen pelvis 02/27/2025 FINDINGS:  image following administration of small volume enteric contrast to confirm enteric tube position.    demonstrates GJ tube in satisfactory position Contrast opacifies normal caliber small bowel loops and reaches the colon by 2 hours.  No evidence of obstruction.     No evidence of obstruction. Electronically signed by: Laureen Christina Date:    02/28/2025 Time:    11:56    CT Chest Abdomen Pelvis With IV Contrast (XPD) NO Oral Contrast  Result Date: 2/27/2025  EXAMINATION: CT CHEST ABDOMEN PELVIS WITH IV CONTRAST (XPD) CLINICAL HISTORY: Hematemesis; TECHNIQUE: Low dose axial images, sagittal and coronal reformations were obtained from the thoracic inlet through the pelvis following the IV administration of 100 mL of Omnipaque 350. Dose reduction techniques including automatic exposure control (AEC) were utilized. Dose (DLP): 816 mGycm COMPARISON: Chest radiograph and abdominal radiograph from the same day.  CT chest, abdomen, and pelvis with IV contrast from 11/12/2024. FINDINGS: Tracheostomy tube in appropriate position.  Linear low-density within the tracheal lumen along the posterior margin of the tracheostomy likely represents fluid secretion.  Small volume fluid secretions also noted within the left mainstem bronchus on series 6, image 47.  Tracheobronchial tree is patent.  Patchy ground-glass opacities are noted within the right middle lobe and right lower lobe.  Subsegmental atelectasis or scarring at the left lung base.  No pleural effusion or pneumothorax. Chronic enlargement of the left atrium.  No pericardial effusion.  Right chest wall AICD in place. Mild calcific atherosclerosis of the thoracic and abdominal aorta.  No evidence of aortic aneurysm or dissection. No evidence of pulmonary artery embolism. No evidence of mediastinal or hilar lymphadenopathy. No free intraperitoneal fluid or gas. Liver is normal in size and attenuation with no evidence of focal lesion.  No intrahepatic or extrahepatic biliary ductal dilatation. Normal appearance of the pancreas. Normal appearance of the adrenal glands.  Normal appearance of the spleen. Both kidneys contain simple cysts.  No nephrolithiasis or hydronephrosis.  The ureters are normal.  There is a Fernandez catheter within the bladder lumen.  There is marked circumferential thickening of the bladder wall which is nonspecific and could be secondary to either Fernandez catheter decompression and/or cystitis.  Curvilinear calcifications noted within the prostate gland. Esophagus is normal.  Percutaneous gastrostomy tube with balloon tip within the gastric lumen.  The tip of the catheter terminates in the transverse portion of the duodenum.  No evidence of bowel obstruction.  Postsurgical sutures are noted in loops of small bowel in the right lower quadrant.  Appendix is normal.  Stool is noted throughout the colon with no evidence of acute colonic abnormality. No evidence of mesenteric, pelvic, or inguinal lymphadenopathy. Interval worsening of a sacral decubitus ulcer when compared to 11/12/2024.  Portions of the superior coccyx are no longer visible, concerning for osteomyelitis.  Gas containing fluid collection noted posterior to the sacrum measuring 2.4 cm x 1.5 cm, cannot exclude an abscess (series 6, image 172).  Worsening increased density within the presacral space. Unchanged exuberant heterotopic ossification noted adjacent to the left iliac wing and anterolateral to the left hip joint. Bilateral gynecomastia.     Interval worsening of a sacral decubitus ulcer with findings suspicious for osteomyelitis of the coccyx.  There is also suggestion of a gas containing fluid collection in the subcutaneous fat superficial to the sacrum measuring 2.4 cm x 1.5 cm, concerning for abscess formation.  Findings could be better evaluated with MRI sacrum/coccyx without and with IV contrast. Patchy ground-glass opacities in the right middle and lower lobes which are nonspecific and could represent either edema or atypical pneumonia. Subsegmental atelectasis of the left lung base. Marked  circumferential thickening of the bladder wall which is decompressed by Fernandez catheter.  Appearance is nonspecific and could be secondary to Fernandez catheter decompression and/or superimposed cystitis.  Correlate with urinalysis. Discrepancy between preliminary and final reports was reported to Dr. Salcido at 09:36 on 02/27/2025. Electronically signed by: Anjel Lambert Date:    02/27/2025 Time:    09:38    X-Ray Chest AP Portable  Result Date: 2/26/2025  EXAMINATION XR CHEST AP PORTABLE CLINICAL HISTORY Anemia, unspecified TECHNIQUE A total of 1 frontal image(s) submitted of the chest. COMPARISON 6 January 2025 FINDINGS Lines/tubes/devices: Right chest wall pacemaker/ICD is unchanged.  Tracheostomy tube remains in similar position.  Multiple ECG leads overlie the chest. The cardiac silhouette and central vascular structures are unchanged.  The trachea is midline. Evaluation of the left mid and lower lung field is limited secondary to superimposed artifact from the patient's hand overlying the chest.  Within limitations, no convincing new or worsened airspace consolidation is identified.  There is no large pleural effusion or convincing pneumothorax. Regional osseous structures and extrathoracic soft tissues are similar. IMPRESSION No acute process or other adverse interval change identified within technical limitations discussed above. Electronically signed by: Isaac Carcamo Date:    02/26/2025 Time:    21:52    X-Ray Abdomen AP 1 View  Result Date: 2/26/2025  EXAMINATION: XR ABDOMEN AP 1 VIEW CLINICAL HISTORY: Gastrostomy status TECHNIQUE: AP X-RAY OF THE ABDOMEN: CPT 01849 FINDINGS: Examination reveals gaseous distension of the abdomen with gas in loops of large and small bowel some residual feces identified throughout the colon the gas pattern is nonspecific with no clear evidence of ileus or obstruction no abnormal masses identified there are rotatory scoliotic changes of the lumbar spine with significant chronic  changes related to the left hip with significant heterotrophic bone formation     Gaseous distension of the abdomen with no other significant abnormalities identified. Changes in the left hip Electronically signed by: Vik Keen Date:    02/26/2025 Time:    11:56         Assessment/Plan:  69 y.o. AA male known to Dr. 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  Patient with multiple prior admissions during which our group was consulted for similar complaints of coffee-ground emesis and tube feed intolerance.  Also has had prior aspiration pneumonia.  s/p PEG exchange for G-J tube extension 10/2024.  Here with anemia on outpatient labs.      Acute on chronic anemia   Hgb 7.1--2u prbcs -- 8.7 -- 8.2  Baseline hgb around 8 as of late.  FOBT negative x1 and positive x2 1/2025.  No overt GIB. Brown stool at present.   EGD 10/2024 with no evidence for anemia.   Colon in 2018 ok.    Likely component of AOCD in light of chronic osteo.     2.  Vomiting   Tube appears in appropriate position.  No acute findings on CT in the GI tract to explain.    SBS with no obstruction but was apparently administered via the J tube     3.  H/O neuroendocrine SB tumor with resection      -Continue ppi iv 40 mg bid.  -Monitor H/H and transfuse as needed to Hgb 7  -Monitor stools for bleeding  -f/u chromogranin A and 24 hour urine THIAA  -Start TFs and slowly advance to goal as tolerated.  Notify GI with any issues.  We are available if needed.        Darlyn Singh PA-C

## 2025-03-03 NOTE — PROGRESS NOTES
[facility] - 7th Floor ICU  Pulmonary/Critical Care  Progress Note  3/3/2025    Patient Name: Delio Daniel Jr.  MRN: [id number]  Admission Date: 2/26/2025  Code Status: Full Code      Subjective:     HPI:  The patient is a 69-year-old male nursing home resident at [facility] with a history of hemorrhagic stroke 2023, chronic atrial fibrillation, coronary artery disease, ventricular arrhythmias with high-degree AV block, post p.m./AICD, and hypertension.  He is reported as nonverbal with dysphagia (PEG in place), chronic tracheostomy and mechanical ventilation since his stroke, chronic indwelling Fernandez, and is chronically bedridden.  He presented to ER 02/27/2025 with H/H of 7.1/24.6 that was noted on routine blood work.  He was transfused with 2 units of PRBCs.  CT of the chest revealed minimal patchy ground-glass infiltrates right middle and right lower lobe.    Hospital Course:      24hr Interval History:  He is afebrile.  Tissue cultures return many Pseudomonas aeruginosa (panresistant) and many Klebsiella pneumoniae (sensitive only to meropenem).  Respiratory secretions with Pseudomonas aeruginosa and intermediate sensitive to cefepime and Cipro, CRPA). He is receiving Peptamen 1.5 at 30 cc/hour and 150 cc q.4h water flushes per PEG.  Liquid brown stool.  No reported vomiting.    SIMV 15/500 cc/PS +10/peep +5/30%    Scheduled Medications:   amiodarone  200 mg Per G Tube Daily    atorvastatin  10 mg Per G Tube Daily    busPIRone  5 mg Per G Tube TID    ceFEPime IV (PEDS and ADULTS)  2 g Intravenous Q8H    cholestyramine-aspartame  1 packet Per G Tube BID    finasteride  5 mg Per G Tube Daily    metoprolol  5 mg Intravenous Once    metoprolol tartrate  25 mg Per G Tube BID    mupirocin   Nasal BID    pantoprazole  40 mg Intravenous BID    potassium chloride in water  20 mEq Intravenous Q2H    QUEtiapine  25 mg Per G Tube BID    sucralfate  1 g Per G Tube QID (AC & HS)    venlafaxine  75 mg Per G Tube  BID     PRN Medications:    Current Facility-Administered Medications:     albuterol-ipratropium, 3 mL, Nebulization, Q6H PRN    dextrose 50%, 12.5 g, Intravenous, PRN    dextrose 50%, 25 g, Intravenous, PRN    glucagon (human recombinant), 1 mg, Intramuscular, PRN    glucose, 16 g, Oral, PRN    glucose, 24 g, Oral, PRN    labetalol, 10 mg, Intravenous, Q2H PRN    ondansetron, 4 mg, Intravenous, Q8H PRN    sodium chloride 0.9%, 10 mL, Intravenous, PRN  Continuous Infusions:   0.9% NaCl   Intravenous Continuous   Stopped at 03/01/25 0840    D10W   Intravenous Continuous 25 mL/hr at 03/03/25 0923 Rate Verify at 03/03/25 0923       Past Medical History:   Diagnosis Date    Arthritis     Atrial fibrillation     BPH (benign prostatic hyperplasia)     Cardiac arrest     Coronary artery disease     Cyst, kidney, acquired     Diverticulosis     Hyperlipidemia     Hypertension     MI (myocardial infarction)     Obesity     Steatosis of liver     Stroke        Past Surgical History:   Procedure Laterality Date    A-V CARDIAC PACEMAKER INSERTION Right     CARDIAC CATHETERIZATION      COLONOSCOPY W/ BIOPSIES      CRANIECTOMY Right 12/20/2023    Procedure: CRANIECTOMY;  Surgeon: Artem Can MD;  Location: [facility];  Service: Neurosurgery;  Laterality: Right;    ESOPHAGOGASTRODUODENOSCOPY W/ PEG N/A 1/2/2024    Procedure: PEG;  Surgeon: Tani Day MD;  Location: [facility] ENDOSCOPY;  Service: Gastroenterology;  Laterality: N/A;    ESOPHAGOGASTRODUODENOSCOPY W/ PEG N/A 10/30/2024    Procedure: EGD w/ Jtube placement;  Surgeon: Gabriele Salcido MD;  Location: [facility] ENDOSCOPY;  Service: Endoscopy;  Laterality: N/A;  with J tube extension    excision of colon      TRACHEOSTOMY N/A 12/29/2023    Procedure: CREATION, TRACHEOSTOMY;  Surgeon: Patricia Winslow MD;  Location: [facility];  Service: ENT;  Laterality: N/A;  REQ 1130 //  NEEDS 2 SCRUBS       Objective:     Input/output:    Intake/Output Summary (Last 24 hours) at  3/3/2025 0942  Last data filed at 3/3/2025 0923  Gross per 24 hour   Intake 624 ml   Output 1360 ml   Net -736 ml       Vital Signs (Most Recent):  Temp: 98.1 °F (36.7 °C) (03/03/25 0800)  Pulse: 103 (03/03/25 0800)  Resp: (!) 22 (03/03/25 0800)  BP: (!) 139/95 (03/03/25 0800)  SpO2: 98 % (03/03/25 0800)  Body mass index is 23.72 kg/m².  Weight: 68.7 kg (151 lb 7.3 oz) Vital Signs (24h Range):  Temp:  [98.1 °F (36.7 °C)-98.9 °F (37.2 °C)] 98.1 °F (36.7 °C)  Pulse:  [] 103  Resp:  [11-27] 22  SpO2:  [81 %-100 %] 98 %  BP: (107-143)/() 139/95     Physical Exam  Constitutional:       Comments: Awake, nonverbal, does not follow commands, appears comfortable on mechanical ventilation   Neck:      Comments: Trach in place with no evidence of infection or drainage  Pulmonary:      Effort: No respiratory distress.      Breath sounds: No wheezing, rhonchi or rales.   Abdominal:      General: There is no distension.      Palpations: Abdomen is soft.      Comments: PEG epigastric, no evidence of infection.   Musculoskeletal:      Right lower leg: No edema.      Left lower leg: No edema.      Comments: Flexion contractures upper and lower extremities bilateral   Neurological:      Comments: He is awake, eyes open.  Nonverbal, no following of commands         Lines/Drains/Airways       Drain  Duration                  Gastrostomy/Enterostomy 10/30/24 1200 Gastrostomy-jejunostomy feeding 123 days         Urethral Catheter 02/27/25 0400 16 Fr. 4 days         Rectal Tube 02/28/25 0730 rectal tube w/ balloon (indicate number of mLs) 3 days              Airway  Duration             Adult Surgical Airway 08/19/24 0120 Wendi Extra Large Cuffed Distal 6.0/ 75mm 196 days              Peripheral Intravenous Line  Duration                  Peripheral IV - Single Lumen 03/01/25 1400 20 G Yes Left;Posterior Forearm 1 day                    Vent:  Vent Mode: SIMV (03/03/25 0856)  Set Rate: 15 BPM (03/03/25 0856)  Vt Set: 500 mL  (03/03/25 0856)  Pressure Support: 10 cmH20 (03/03/25 0856)  PEEP/CPAP: 5 cmH20 (03/03/25 0856)  Oxygen Concentration (%): 30 (03/03/25 0856)  Peak Airway Pressure: 17 cmH20 (03/03/25 0856)  Total Ve: 7.4 L/m (03/03/25 0856)  F/VT Ratio<105 (RSBI): (!) 48.19 (03/03/25 0529)    ABGs:  Lab Results   Component Value Date    PH 7.480 (H) 01/07/2025    PO2 96.0 01/07/2025    PCO2 39.0 01/07/2025    HTZ1RCT 32.6 (A) 01/17/2024    POCSATURATED 98 02/02/2024         Significant Labs:    Lab Results   Component Value Date    WBC 5.73 03/01/2025    HGB 8.4 (L) 03/01/2025    HCT 28.9 (L) 03/01/2025    MCV 93.2 03/01/2025     03/01/2025         Recent Labs   Lab 03/03/25  0251      K 3.4*   *   CO2 19*   BUN 12.9   CREATININE 0.57*   CALCIUM 8.2*   MG 1.60   PHOS 2.3     Imaging:   None this a.m.    Assessment:     Hemorrhagic stroke 2023, remains nonverbal and bedridden since that time, chronic nursing home resident of [facility], on mechanical ventilation.  Chronic respiratory failure secondary to above, on permanent mechanical ventilatory support at nursing home.  He appears clinically at his baseline respiratory status.    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  Wound cultures growing panresistant/CRPA and ESBL Klebsiella pneumoniae  Coronary artery disease with previous history of high-degree AV block and VFib arrest, post permanent pacer/AICD  Reported daily vomiting episodes at nursing home over the past week, no further vomiting greater than 48 hours, tolerating PEG feedings.  Chronic anemia, post transfusion 2 units PRBCs on arrival to ER.  No evidence of active bleeding  Hypertension    Plan:     Continue current baseline mechanical ventilatory settings  Continue enteral feedings per PEG, GI following.  As per Wound Care recommendations, actively following patient.  Cefepime (day 4).  Will consult Infectious Disease service now that  sensitivities are available.     35 minutes of critical care was time spent personally by me on the following activities: development of treatment plan with patient or surrogate and bedside caregivers, discussions with consultants, evaluation of patient's response to treatment, examination of patient, ordering and performing treatments and interventions, ordering and review of laboratory studies, ordering and review of radiographic studies, pulse oximetry, re-evaluation of patient's condition.  This patient demonstrates a high probability for further clinical decompensation due to ongoing critical illness.  Critical care time did not overlap with that of any other provider or involve time for any procedures.       Miriam Hutchins MD, [facility]  Pulmonary/Critical Care

## 2025-03-03 NOTE — CONSULTS
INFECTIOUS DISEASE CONSULT NOTE   Admit Date: 2/26/2025  CONSULT FOR : MDROs infecting his sacrum  Chief Complaint:  Anemia requiring transfusions    HPI:      History obtained from review of records as patient is non-verbal. He has been in a nursing home, vent dependent (via trach) since a hemorrhagic stroke 2 years ago. Since then he has developed a sacral ulcer which has worsened to the point of bone exposure. He was sent to the hospital when the SNF noticed worsened anemia. Patient has a h/o GIB. On presentation wound care cultured his sacral wound and MDR Pseudomonas and Klebsiella were isolated. MRI showed 2.4x1.5cm fluid collection with gas posterior to the sacrum with osteomyelitis. ID is asked to assist with antibiotic management. Patient never had fever or leukocytosis on this admission.    ROS:      Unable to obtain as patient is obtunded.    PMHx:      Past Medical History:   Diagnosis Date    Arthritis     Atrial fibrillation     BPH (benign prostatic hyperplasia)     Cardiac arrest     Coronary artery disease     Cyst, kidney, acquired     Diverticulosis     Hyperlipidemia     Hypertension     MI (myocardial infarction)     Obesity     Steatosis of liver     Stroke         PMSx:      Past Surgical History:   Procedure Laterality Date    A-V CARDIAC PACEMAKER INSERTION Right     CARDIAC CATHETERIZATION      COLONOSCOPY W/ BIOPSIES      CRANIECTOMY Right 12/20/2023    Procedure: CRANIECTOMY;  Surgeon: Artem Can MD;  Location: [facility] OR;  Service: Neurosurgery;  Laterality: Right;    ESOPHAGOGASTRODUODENOSCOPY W/ PEG N/A 1/2/2024    Procedure: PEG;  Surgeon: Tani Day MD;  Location: [facility] ENDOSCOPY;  Service: Gastroenterology;  Laterality: N/A;    ESOPHAGOGASTRODUODENOSCOPY W/ PEG N/A 10/30/2024    Procedure: EGD w/ Jtube placement;  Surgeon: Gabriele Salcido MD;  Location: [facility] ENDOSCOPY;  Service: Endoscopy;  Laterality: N/A;  with J tube extension    excision of  colon      TRACHEOSTOMY N/A 12/29/2023    Procedure: CREATION, TRACHEOSTOMY;  Surgeon: Patricia Winslow MD;  Location: [facility];  Service: ENT;  Laterality: N/A;  REQ 1130 //  NEEDS 2 SCRUBS        Social Hx:      Social History     Socioeconomic History    Marital status:     Number of children: 9   Occupational History    Occupation: retired   Tobacco Use    Smoking status: Never    Smokeless tobacco: Never   Substance and Sexual Activity    Alcohol use: Not Currently    Drug use: Not Currently    Sexual activity: Not Currently     Partners: Female     Social Drivers of Health     Financial Resource Strain: Patient Unable To Answer (2/27/2025)    Overall Financial Resource Strain (CARDIA)     Difficulty of Paying Living Expenses: Patient unable to answer   Food Insecurity: Patient Unable To Answer (2/27/2025)    Hunger Vital Sign     Worried About Running Out of Food in the Last Year: Patient unable to answer     Ran Out of Food in the Last Year: Patient unable to answer   Transportation Needs: Patient Unable To Answer (1/6/2025)    TRANSPORTATION NEEDS     Transportation : Patient unable to answer   Physical Activity: Sufficiently Active (8/5/2024)    Exercise Vital Sign     Days of Exercise per Week: 5 days     Minutes of Exercise per Session: 30 min   Stress: Patient Unable To Answer (2/27/2025)    [employer] - Occupational Stress Questionnaire     Feeling of Stress : Patient unable to answer   Housing Stability: Patient Unable To Answer (2/27/2025)    Housing Stability Vital Sign     Unable to Pay for Housing in the Last Year: Patient unable to answer     Homeless in the Last Year: Patient unable to answer        Family Hx:      Family History   Problem Relation Name Age of Onset    Hypertension Mother      Hypertension Father      Hypertension Sister      `  Review of patient's allergies indicates:  No Known Allergies    Medications:      Current Facility-Administered  Medications   Medication    0.9% NaCl infusion    albuterol-ipratropium 2.5 mg-0.5 mg/3 mL nebulizer solution 3 mL    amiodarone tablet 200 mg    atorvastatin tablet 10 mg    busPIRone tablet 5 mg    ceFEPIme injection 2 g    cholestyramine-aspartame 4 gram packet 4 grams of anhydrous cholestyramine    dextrose 50% injection 12.5 g    dextrose 50% injection 25 g    finasteride tablet 5 mg    glucagon (human recombinant) injection 1 mg    glucose chewable tablet 16 g    glucose chewable tablet 24 g    labetaloL injection 10 mg    metoprolol injection 5 mg    metoprolol tartrate (LOPRESSOR) tablet 25 mg    mupirocin 2 % ointment    ondansetron injection 4 mg    pantoprazole injection 40 mg    QUEtiapine tablet 25 mg    sodium chloride 0.9% flush 10 mL    sucralfate tablet 1 g    venlafaxine tablet 75 mg       VITALS:      Vital Signs Range (Last 24H):  Temp:  [98.1 °F (36.7 °C)-98.9 °F (37.2 °C)]   Pulse:  []   Resp:  [13-26]   BP: (108-143)/()   SpO2:  [91 %-100 %]     I & O (Last 24H):    Intake/Output Summary (Last 24 hours) at 3/3/2025 1738  Last data filed at 3/3/2025 1600  Gross per 24 hour   Intake 1165.19 ml   Output 1185 ml   Net -19.81 ml       Physical Exam   Constitutional: Pt is obtunded. cachectic.   Eyes, nose and throat:  Pale moist mucosa, anicteric and acyanotic, BENITEZ, no obvious oral exudates  Neck: tracheostomy connected to the vent, no secretions  Cardiovascular: Normal rate and regular rhythm.  S1 and S2 appreciated by ascultation, no murmurs  Pulmonary/Chest: no crepitations or wheezes auscultated   Abdomen:  PEG present, normal BS, soft, no mass or organomegaly appreciated.  Neurological: contracted on the left side (upper and lower extremity)  Extremities: No edema.   Skin: wound care photos reviewed with large sacral ulcer noted along with other pressure wounds.    Laboratory Data:  Reviewed and noted in plan where applicable- Please see chart for full laboratory data.    No  "results for input(s): "CPK", "CPKMB", "TROPONINI", "MB" in the last 24 hours.   Recent Labs   Lab 03/03/25  0834   POCTGLUCOSE 76        Lab Results   Component Value Date    INR 1.4 (H) 02/26/2025    INR 2.7 (H) 01/05/2025    INR 1.4 (H) 08/19/2024       Lab Results   Component Value Date    WBC 5.73 03/01/2025    HGB 8.4 (L) 03/01/2025    HCT 28.9 (L) 03/01/2025    MCV 93.2 03/01/2025     03/01/2025       BMP  Recent Labs   Lab 03/03/25  0251      K 3.4*   *   CO2 19*   BUN 12.9   CREATININE 0.57*   CALCIUM 8.2*   MG 1.60     MICROBIOLOGY:  MDR Pseudomonas from sacrum and respiratory sample; ESBL Kleb from sacrum    Radiology:  Reviewed and noteable for no lung consolidation      ASSESSMENT/PLAN:     ACTIVE PROBLEMS:  Chronic respiratory failure on mechanical ventilation. 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 There is no evidence of pneumonia on CT chest.  Sacral osteomyelitis due to MDROs, in a debilitated, nursing home patient who has no hope of recovery.          RECOMMENDATIONS:  Since the patient has no fever or leukocytosis, he is not septic, I am going to recommend local wound care only. If he were a surgical candidate we could get the surgeons to aggressive debride and consider flap closure. In that case antibiotics would be indicated with the goal of curing his infection. Unfortunately, given his debilitated state this patient is not likely to be able to heal a flap. Aggressive antibiotics in this case will cause more harm than good, therefore I recommend stopping antibiotics and continuing local wound care only.    Stop cefepime as it is ineffective.    Contact isolation precautions should be maintained.    Thank you very much for the consult.  ID will sign off now. Please call again prn.      Communicated with Dr Hutchins        "

## 2025-03-03 NOTE — PROGRESS NOTES
Inpatient Nutrition Assessment    Admit Date: 2/26/2025   Total duration of encounter: 5 days   Patient Age: 69 y.o.    Nutrition Recommendation/Prescription     Tube feeding as tolerated, advance by 10 ml/hr every 4 hours (or per physician):  Impact Peptide 1.5 goal rate 50 ml/hr   [id number] once daily  Patel twice daily  to provide  1760 kcal/d, 104% needs  119 g protein/d, 100% needs  770 ml free water/d, 45% needs, recommend 45 ml/hr water flush when appropriate to meet 98% estimated fluid requirements  (calculations based on estimated 20 hr/d run time)     Communication of Recommendations: reviewed with nurse    Nutrition Assessment     Malnutrition Assessment/Nutrition-Focused Physical Exam    Malnutrition Context: chronic illness (02/27/25 1630)  Malnutrition Level: severe (02/27/25 1630)  Energy Intake (Malnutrition): other (see comments) (Unable to assess) (02/27/25 1630)  Weight Loss (Malnutrition): greater than 20% in 1 year (02/27/25 1630)  Subcutaneous Fat (Malnutrition): severe depletion (02/27/25 1630)     Upper Arm Region (Subcutaneous Fat Loss): severe depletion     Muscle Mass (Malnutrition): severe depletion (02/27/25 1630)     Clavicle Bone Region (Muscle Loss): severe depletion                             A minimum of two characteristics is recommended for diagnosis of either severe or non-severe malnutrition.    Chart Review    Reason Seen: follow-up    Malnutrition Screening Tool Results   Have you recently lost weight without trying?: No  Have you been eating poorly because of a decreased appetite?: No   MST Score: 0   Diagnosis:  Normocytic anemia, possible GI bleed  Atypical pneumonia vs recurrent pneumonia  Cystitis   Chronic respiratory failure  Seizure    Relevant Medical History: HTN, CAD, STEMI 2003, hemorrhagic CVA s/p trach PEG 2023, Afib, PM/AICD for history of AV block and Vfib arrest, and recurrent UTI     Scheduled Medications:  amiodarone, 200 mg, Daily  atorvastatin, 10 mg,  Daily  busPIRone, 5 mg, TID  ceFEPime IV (PEDS and ADULTS), 2 g, Q8H  cholestyramine-aspartame, 1 packet, BID  finasteride, 5 mg, Daily  metoprolol, 5 mg, Once  metoprolol tartrate, 25 mg, BID  mupirocin, , BID  pantoprazole, 40 mg, BID  QUEtiapine, 25 mg, BID  sucralfate, 1 g, QID (AC & HS)  venlafaxine, 75 mg, BID    Continuous Infusions:  0.9% NaCl, Last Rate: Stopped (03/01/25 0840)    PRN Medications:   albuterol-ipratropium, 3 mL, Q6H PRN  dextrose 50%, 12.5 g, PRN  dextrose 50%, 25 g, PRN  glucagon (human recombinant), 1 mg, PRN  glucose, 16 g, PRN  glucose, 24 g, PRN  labetalol, 10 mg, Q2H PRN  ondansetron, 4 mg, Q8H PRN  sodium chloride 0.9%, 10 mL, PRN    Calorie Containing IV Medications: no significant kcals from medications at this time    Recent Labs   Lab 02/26/25  0435 02/26/25  2107 02/27/25  0855 02/27/25  1411 02/28/25  0355 03/01/25  0405 03/02/25  0304 03/03/25  0251    143 140  --  143 147* 146* 140   K 3.3* 3.2* 3.7  --  3.9 3.1* 2.9* 3.4*   CALCIUM 8.2* 8.8 9.5  --  8.0* 8.2* 8.4* 8.2*   PHOS  --   --   --   --  3.0 2.8  --  2.3   MG 2.30 2.30  --   --  2.00 1.90 1.80 1.60    108* 109*  --  115* 119* 116* 112*   CO2 25 24 24  --  22* 21* 21* 19*   BUN 23.8 20.2 16.6  --  16.4 17.6 17.5 12.9   CREATININE 0.64* 0.63* 0.66*  --  0.59* 0.59* 0.59* 0.57*   EGFRNORACEVR >60 >60 >60  --  >60 >60 >60 >60   GLUCOSE 64* 90 105  --  96 80* 73* 68*   BILITOT 1.2 1.2  --   --  0.9 1.3  --   --    ALKPHOS 84 86  --   --  73 79  --   --    ALT 11 8  --   --  9 8  --   --    AST 17 16  --   --  11 15  --   --    ALBUMIN 2.3* 2.5*  --   --  2.1* 2.3*  --   --    PREALB  --   --   --  17.5  --   --   --   --    WBC 7.02 6.47 7.29  --  6.37 5.73  --   --    HGB 6.3* 7.1* 8.7*  --  8.2* 8.4*  --   --    HCT 22.8* 24.6* 28.1*  --  26.6* 28.9*  --   --      Nutrition Orders:  Diet NPO  Tube Feedings/Formulas Other (see comments); Jejunostomy; ProSource TF20,Tube Feedings/Formulas Other (see comments);  "Jejunostomy; Patel - Orange; 2 times daily,Tube Feedings/Formulas 50; 1,000; Impact Peptide 1.5; Jejunostomy (advance by 10 ml/hr every 4 hours or per physician); 150; Every 2 hours    Appetite/Oral Intake: NPO/not applicable  Factors Affecting Nutritional Intake: NPO, on mechanical ventilation, and tracheostomy  Social Needs Impacting Access to Food: none identified  Food/Nondenominational/Cultural Preferences: unable to obtain  Food Allergies: none reported  Last Bowel Movement: 03/03/25  Wound(s):     Wound 01/05/25 2100 Pressure Injury medial Coccyx #1-Tissue loss description: Full thickness       Wound 08/19/24 1500 Pressure Injury Left anterior Leg-Tissue loss description: Partial thickness       Wound 08/19/24 1500 Pressure Injury Left lateral Ankle-Tissue loss description: Partial thickness       Wound 01/06/25 1000 Pressure Injury Left lateral;distal Foot-Tissue loss description: Partial thickness    Comments    2/27/25 Patient is chronically ventilator dependent per tracheostomy at nursing home, multiple wounds including stage 4. Documentation of gastrostomy/jejunostomy tube, nurse reports feeding tube resides in the duodenum; reports recent vomiting. Tube feeding started today with Peptamen AF at 20 ml/hr, consult received for recommendations. Recommend Impact Peptide 1.5 (lower volume, wound healing) goal rate     3/3/25 Patient receiving tube feeding per J-tube, nurse reports vomiting and tube feeding turned off, resumed this morning at 20 ml/hr with plans to continue to advance as tolerated, reports GI confirmed feeding tube is in jejunum.     Anthropometrics    Height: 5' 7" (170.2 cm), Height Method: Estimated  Last Weight: 68.7 kg (151 lb 7.3 oz) (02/27/25 1622), Weight Method: Bed Scale  BMI (Calculated): 23.7  BMI Classification: normal (BMI 18.5-24.9)        Ideal Body Weight (IBW), Male: 148 lb     % Ideal Body Weight, Male (lb): 101.35 %                          Usual Weight Provided By: unable to " obtain usual weight, weight history in chart reveals over 20% weight loss in 6 months    Wt Readings from Last 10 Encounters:   02/27/25 68.7 kg (151 lb 7.3 oz)   01/06/25 65 kg (143 lb 4.8 oz)   10/21/24 69.1 kg (152 lb 5.4 oz)   08/19/24 90.7 kg (199 lb 15.3 oz)   08/03/24 117.9 kg (260 lb)   07/18/24 117.9 kg (260 lb)   06/25/24 90.7 kg (200 lb)   05/15/24 90.7 kg (200 lb)   02/25/24 95.6 kg (210 lb 12.2 oz)   01/20/24 117.9 kg (260 lb)     Weight Change(s) Since Admission:   (2/27) admission weight estimated, took bed weight 68.7 kg during rounds  Wt Readings from Last 1 Encounters:   02/27/25 1622 68.7 kg (151 lb 7.3 oz)   02/26/25 2027 68 kg (150 lb)   Admit Weight: 68 kg (150 lb) (02/26/25 2027), Weight Method: Estimated    Estimated Needs    Weight Used For Calorie Calculations: 68 kg (149 lb 14.6 oz)  Energy Calorie Requirements (kcal): 1700, 25 kcal/kg     Weight Used For Protein Calculations: 68 kg (149 lb 14.6 oz)  Protein Requirements: 102-123 g, 1.5-1.8 g/kg  Fluid Requirements (mL): 1700, 1 ml/kcal  CHO Requirement: N/A     Enteral Nutrition     Formula: Impact Peptide 1.5 [place]  Rate/Volume: 20 ml/hr  Water Flushes: none  Additives/Modulars: none at this time  Route: PEG/J  Method: continuous  Total Nutrition Provided by Tube Feeding, Additives, and Flushes:  Calories Provided  600 kcal/d, 35% needs   Protein Provided  38 g/d, 37% needs   Fluid Provided  1808 ml/d, 106% needs   Continuous feeding calculations based on estimated 20 hr/d run time unless otherwise stated.    Parenteral Nutrition Patient not receiving parenteral nutrition support at this time.    Evaluation of Received Nutrient Intake    Calories: not meeting estimated needs  Protein: not meeting estimated needs    Patient Education Not applicable.    Nutrition Diagnosis     PES: Inadequate energy intake related to inability to consume sufficient nutrients as evidenced by less than 80% needs met. (active)    PES: Severe chronic disease or  condition related malnutrition Related to  As Evidenced by:  - weight loss: > 20% in 1 year - muscle mass depletion: 1 area of severe muscle loss (Clavicle) - loss of subcutaneous fat: 1 area of severe fat loss (Triceps Skinfold) active    Nutrition Interventions     Intervention(s): modified rate of enteral nutrition and collaboration with other providers    Goal: Meet greater than 80% of nutritional needs by follow-up. (goal not met)  Goal: Tolerate enteral feeding at goal rate by follow-up. (goal not met)    Nutrition Goals & Monitoring     Dietitian will monitor: energy intake, enteral nutrition intake, weight, weight change, electrolyte/renal panel, beliefs/attitudes, glucose/endocrine profile, and gastrointestinal profile  Discharge planning: too early to determine; pending clinical course  Nutrition Risk/Follow-Up: high (follow-up in 1-4 days)   Please consult if re-assessment needed sooner.

## 2025-03-04 LAB
BACTERIA BLD CULT: NORMAL
BACTERIA BLD CULT: NORMAL
BACTERIA TISS AEROBE CULT: ABNORMAL
BACTERIA TISS AEROBE CULT: ABNORMAL
IMP CARBAPENEMASE ISLT QL IA.RAPID: NOT DETECTED
KPC CARBAPENEMASE ISLT QL IA.RAPID: NOT DETECTED
NDM CARBAPENEMASE ISLT QL IA.RAPID: NOT DETECTED
OXA-48-LIKE CRBPNASE ISLT QL IA.RAPID: NOT DETECTED
VIM CARBAPENEMASE ISLT QL IA.RAPID: NOT DETECTED

## 2025-03-04 PROCEDURE — 94003 VENT MGMT INPAT SUBQ DAY: CPT

## 2025-03-04 PROCEDURE — 94761 N-INVAS EAR/PLS OXIMETRY MLT: CPT

## 2025-03-04 PROCEDURE — 27100171 HC OXYGEN HIGH FLOW UP TO 24 HOURS

## 2025-03-04 PROCEDURE — 99900031 HC PATIENT EDUCATION (STAT)

## 2025-03-04 PROCEDURE — 94760 N-INVAS EAR/PLS OXIMETRY 1: CPT

## 2025-03-04 PROCEDURE — 99900026 HC AIRWAY MAINTENANCE (STAT)

## 2025-03-04 PROCEDURE — 20000000 HC ICU ROOM

## 2025-03-04 PROCEDURE — 99900022

## 2025-03-04 PROCEDURE — 63600175 PHARM REV CODE 636 W HCPCS: Performed by: STUDENT IN AN ORGANIZED HEALTH CARE EDUCATION/TRAINING PROGRAM

## 2025-03-04 PROCEDURE — 27200966 HC CLOSED SUCTION SYSTEM

## 2025-03-04 PROCEDURE — 63600175 PHARM REV CODE 636 W HCPCS: Performed by: INTERNAL MEDICINE

## 2025-03-04 PROCEDURE — 99900035 HC TECH TIME PER 15 MIN (STAT)

## 2025-03-04 PROCEDURE — 25000003 PHARM REV CODE 250: Performed by: STUDENT IN AN ORGANIZED HEALTH CARE EDUCATION/TRAINING PROGRAM

## 2025-03-04 PROCEDURE — 27000207 HC ISOLATION

## 2025-03-04 RX ADMIN — CHOLESTYRAMINE 4 GRAMS OF ANHYDROUS CHOLESTYRAMINE: 4 POWDER, FOR SUSPENSION ORAL at 09:03

## 2025-03-04 RX ADMIN — CEFEPIME 2 G: 2 INJECTION, POWDER, FOR SOLUTION INTRAVENOUS at 08:03

## 2025-03-04 RX ADMIN — SUCRALFATE 1 G: 1 TABLET ORAL at 11:03

## 2025-03-04 RX ADMIN — QUETIAPINE FUMARATE 25 MG: 25 TABLET ORAL at 08:03

## 2025-03-04 RX ADMIN — METOPROLOL TARTRATE 25 MG: 25 TABLET, FILM COATED ORAL at 08:03

## 2025-03-04 RX ADMIN — BUSPIRONE HYDROCHLORIDE 5 MG: 5 TABLET ORAL at 02:03

## 2025-03-04 RX ADMIN — PANTOPRAZOLE SODIUM 40 MG: 40 INJECTION, POWDER, LYOPHILIZED, FOR SOLUTION INTRAVENOUS at 08:03

## 2025-03-04 RX ADMIN — FINASTERIDE 5 MG: 5 TABLET, FILM COATED ORAL at 08:03

## 2025-03-04 RX ADMIN — VENLAFAXINE 75 MG: 37.5 TABLET ORAL at 08:03

## 2025-03-04 RX ADMIN — AMIODARONE HYDROCHLORIDE 200 MG: 200 TABLET ORAL at 08:03

## 2025-03-04 RX ADMIN — CHOLESTYRAMINE 4 GRAMS OF ANHYDROUS CHOLESTYRAMINE: 4 POWDER, FOR SUSPENSION ORAL at 08:03

## 2025-03-04 RX ADMIN — BUSPIRONE HYDROCHLORIDE 5 MG: 5 TABLET ORAL at 08:03

## 2025-03-04 RX ADMIN — SUCRALFATE 1 G: 1 TABLET ORAL at 03:03

## 2025-03-04 RX ADMIN — ATORVASTATIN CALCIUM 10 MG: 10 TABLET, FILM COATED ORAL at 08:03

## 2025-03-04 RX ADMIN — SUCRALFATE 1 G: 1 TABLET ORAL at 08:03

## 2025-03-04 RX ADMIN — SUCRALFATE 1 G: 1 TABLET ORAL at 05:03

## 2025-03-04 NOTE — PLAN OF CARE
Problem: Fall Injury Risk  Goal: Absence of Fall and Fall-Related Injury  Outcome: Progressing     Problem: Infection  Goal: Absence of Infection Signs and Symptoms  Outcome: Not Progressing     Problem: Adult Inpatient Plan of Care  Goal: Patient-Specific Goal (Individualized)  Outcome: Not Progressing     Problem: Sepsis/Septic Shock  Goal: Optimal Coping  Outcome: Not Progressing     Problem: Wound  Goal: Optimal Coping  Outcome: Not Progressing     Problem: Neutropenia  Goal: Absence of Infection  Outcome: Not Progressing     Problem: Skin Injury Risk Increased  Goal: Skin Health and Integrity  Outcome: Not Progressing

## 2025-03-04 NOTE — PLAN OF CARE
Problem: Infection  Goal: Absence of Infection Signs and Symptoms  Outcome: Progressing     Problem: Adult Inpatient Plan of Care  Goal: Plan of Care Review  Outcome: Progressing  Goal: Patient-Specific Goal (Individualized)  Outcome: Progressing  Goal: Absence of Hospital-Acquired Illness or Injury  Outcome: Progressing  Goal: Optimal Comfort and Wellbeing  Outcome: Progressing  Goal: Readiness for Transition of Care  Outcome: Progressing     Problem: Sepsis/Septic Shock  Goal: Optimal Coping  Outcome: Progressing  Goal: Absence of Bleeding  Outcome: Progressing  Goal: Blood Glucose Level Within Targeted Range  Outcome: Progressing  Goal: Absence of Infection Signs and Symptoms  Outcome: Progressing  Goal: Optimal Nutrition Intake  Outcome: Progressing     Problem: Pneumonia  Goal: Fluid Balance  Outcome: Progressing  Goal: Resolution of Infection Signs and Symptoms  Outcome: Progressing  Goal: Effective Oxygenation and Ventilation  Outcome: Progressing     Problem: Wound  Goal: Optimal Coping  Outcome: Progressing  Goal: Optimal Functional Ability  Outcome: Progressing  Goal: Absence of Infection Signs and Symptoms  Outcome: Progressing  Goal: Improved Oral Intake  Outcome: Progressing  Goal: Optimal Pain Control and Function  Outcome: Progressing  Goal: Skin Health and Integrity  Outcome: Progressing  Goal: Optimal Wound Healing  Outcome: Progressing     Problem: Neutropenia  Goal: Absence of Infection  Outcome: Progressing     Problem: Skin Injury Risk Increased  Goal: Skin Health and Integrity  Outcome: Progressing     Problem: Fall Injury Risk  Goal: Absence of Fall and Fall-Related Injury  Outcome: Progressing     Problem: Delirium  Goal: Optimal Coping  Outcome: Progressing  Goal: Improved Behavioral Control  Outcome: Progressing  Goal: Improved Attention and Thought Clarity  Outcome: Progressing  Goal: Improved Sleep  Outcome: Progressing

## 2025-03-04 NOTE — PROGRESS NOTES
[facility] - 7th Floor ICU  Pulmonary/Critical Care  Progress Note  3/4/2025    Patient Name: Delio Daniel Jr.  MRN: [id number]  Admission Date: 2/26/2025  Code Status: Full Code      Subjective:     HPI:  The patient is a 69-year-old male nursing home resident at [facility] with a history of hemorrhagic stroke 2023, chronic atrial fibrillation, coronary artery disease, ventricular arrhythmias with high-degree AV block, post p.m./AICD, and hypertension.  He is reported as nonverbal with dysphagia (PEG in place), chronic tracheostomy and mechanical ventilation since his stroke, chronic indwelling Fernandez, and is chronically bedridden.  He presented to ER 02/27/2025 with H/H of 7.1/24.6 that was noted on routine blood work.  He was transfused with 2 units of PRBCs.  CT of the chest revealed minimal patchy ground-glass infiltrates right middle and right lower lobe.    Hospital Course:      24hr Interval History:  He is afebrile.  Tissue cultures return many Pseudomonas aeruginosa (panresistant) and many Klebsiella pneumoniae (sensitive only to meropenem).  Respiratory secretions with Pseudomonas aeruginosa and intermediate sensitive to cefepime and Cipro, CRPA).  Infectious Disease consultation reviewed.  They feel that this has chronic predominant colonization/infection, and recommend discontinuation of his current antibiotic regimen.  Suctioning moderate amount of white to clear secretions per endotracheal tube.  He is receiving Peptamen 1.5 at 30 cc/hour and 150 cc q.4h water flushes per PEG.  Liquid brown stool.  No reported vomiting.    SIMV 15/500 cc/PS +10/peep +5/30%    Scheduled Medications:   amiodarone  200 mg Per G Tube Daily    atorvastatin  10 mg Per G Tube Daily    busPIRone  5 mg Per G Tube TID    ceFEPime IV (PEDS and ADULTS)  2 g Intravenous Q8H    cholestyramine-aspartame  1 packet Per G Tube BID    finasteride  5 mg Per G Tube Daily    metoprolol  5 mg Intravenous Once    metoprolol  tartrate  25 mg Per G Tube BID    pantoprazole  40 mg Intravenous BID    QUEtiapine  25 mg Per G Tube BID    sucralfate  1 g Per G Tube QID (AC & HS)    venlafaxine  75 mg Per G Tube BID     PRN Medications:    Current Facility-Administered Medications:     albuterol-ipratropium, 3 mL, Nebulization, Q6H PRN    dextrose 50%, 12.5 g, Intravenous, PRN    dextrose 50%, 25 g, Intravenous, PRN    glucagon (human recombinant), 1 mg, Intramuscular, PRN    glucose, 16 g, Oral, PRN    glucose, 24 g, Oral, PRN    labetalol, 10 mg, Intravenous, Q2H PRN    ondansetron, 4 mg, Intravenous, Q8H PRN    sodium chloride 0.9%, 10 mL, Intravenous, PRN  Continuous Infusions:        Past Medical History:   Diagnosis Date    Arthritis     Atrial fibrillation     BPH (benign prostatic hyperplasia)     Cardiac arrest     Coronary artery disease     Cyst, kidney, acquired     Diverticulosis     Hyperlipidemia     Hypertension     MI (myocardial infarction)     Obesity     Steatosis of liver     Stroke        Past Surgical History:   Procedure Laterality Date    A-V CARDIAC PACEMAKER INSERTION Right     CARDIAC CATHETERIZATION      COLONOSCOPY W/ BIOPSIES      CRANIECTOMY Right 12/20/2023    Procedure: CRANIECTOMY;  Surgeon: Artem Can MD;  Location: [facility];  Service: Neurosurgery;  Laterality: Right;    ESOPHAGOGASTRODUODENOSCOPY W/ PEG N/A 1/2/2024    Procedure: PEG;  Surgeon: Tani Day MD;  Location: [facility] ENDOSCOPY;  Service: Gastroenterology;  Laterality: N/A;    ESOPHAGOGASTRODUODENOSCOPY W/ PEG N/A 10/30/2024    Procedure: EGD w/ Jtube placement;  Surgeon: Gabriele Salcido MD;  Location: [facility] ENDOSCOPY;  Service: Endoscopy;  Laterality: N/A;  with J tube extension    excision of colon      TRACHEOSTOMY N/A 12/29/2023    Procedure: CREATION, TRACHEOSTOMY;  Surgeon: Patricia Winslow MD;  Location: [facility];  Service: ENT;  Laterality: N/A;  REQ 1130 //  NEEDS 2 SCRUBS       Objective:      Input/output:    Intake/Output Summary (Last 24 hours) at 3/4/2025 0758  Last data filed at 3/4/2025 0507  Gross per 24 hour   Intake 2624.93 ml   Output 1620 ml   Net 1004.93 ml       Vital Signs (Most Recent):  Temp: 98.3 °F (36.8 °C) (03/04/25 0400)  Pulse: 101 (03/04/25 0700)  Resp: 17 (03/04/25 0700)  BP: (!) 125/99 (03/04/25 0700)  SpO2: 100 % (03/04/25 0700)  Body mass index is 23.72 kg/m².  Weight: 68.7 kg (151 lb 7.3 oz) Vital Signs (24h Range):  Temp:  [98.1 °F (36.7 °C)-99.2 °F (37.3 °C)] 98.3 °F (36.8 °C)  Pulse:  [] 101  Resp:  [15-29] 17  SpO2:  [91 %-100 %] 100 %  BP: (101-139)/() 125/99     Physical Exam  Constitutional:       Comments: Awake, nonverbal, does not follow commands, appears comfortable on mechanical ventilation   Neck:      Comments: Trach in place with no evidence of infection or drainage  Pulmonary:      Effort: No respiratory distress.      Breath sounds: No wheezing, rhonchi or rales.   Abdominal:      General: There is no distension.      Palpations: Abdomen is soft.      Comments: PEG epigastric, no evidence of infection.   Musculoskeletal:      Right lower leg: No edema.      Left lower leg: No edema.      Comments: Flexion contractures upper and lower extremities bilateral   Neurological:      Comments: He is awake, eyes open.  Nonverbal, no following of commands         Lines/Drains/Airways       Drain  Duration                  Gastrostomy/Enterostomy 10/30/24 1200 Gastrostomy-jejunostomy feeding 124 days         Urethral Catheter 02/27/25 0400 16 Fr. 5 days              Airway  Duration             Adult Surgical Airway 08/19/24 0120 Wendi Extra Large Cuffed Distal 6.0/ 75mm 197 days              Peripheral Intravenous Line  Duration                  Peripheral IV - Single Lumen 03/01/25 1400 20 G Yes Left;Posterior Forearm 2 days         Peripheral IV - Single Lumen 03/03/25 1200 20 G Right Antecubital <1 day         Peripheral IV - Single Lumen 03/03/25  "1215 20 G Anterior;Left Hand <1 day                    Vent:  Vent Mode: SIMV (03/04/25 0529)  Set Rate: 15 BPM (03/04/25 0529)  Vt Set: 500 mL (03/04/25 0529)  Pressure Support: 10 cmH20 (03/04/25 0529)  PEEP/CPAP: 5 cmH20 (03/04/25 0529)  Oxygen Concentration (%): 30 (03/04/25 0700)  Peak Airway Pressure: 15 cmH20 (03/04/25 0529)  Total Ve: 9.3 L/m (03/04/25 0529)  F/VT Ratio<105 (RSBI): (!) 48.19 (03/03/25 0529)    ABGs:  Lab Results   Component Value Date    PH 7.480 (H) 01/07/2025    PO2 96.0 01/07/2025    PCO2 39.0 01/07/2025    NHV4SWM 32.6 (A) 01/17/2024    POCSATURATED 98 02/02/2024         Significant Labs:    Lab Results   Component Value Date    WBC 5.73 03/01/2025    HGB 8.4 (L) 03/01/2025    HCT 28.9 (L) 03/01/2025    MCV 93.2 03/01/2025     03/01/2025         No results for input(s): "NA", "K", "CL", "CO2", "BUN", "CREATININE", "CALCIUM", "MG", "PHOS", "TRIG", "ANIONGAP", "AST", "ALT", "ALKPHOS", "ALBUMIN", "CKTOTAL", "CKMB", "TROPONINI", "BNP", "INR", "PROTIME", "PTT", "EGFRNONAA" in the last 24 hours.    Invalid input(s): "TBIL"    Imaging:   None this a.m.    Assessment:     Hemorrhagic stroke 2023, remains nonverbal and bedridden since that time, chronic nursing home resident of [facility], on mechanical ventilation.  Chronic respiratory failure secondary to above, on permanent mechanical ventilatory support at nursing home.  He appears clinically at his baseline respiratory status.    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  Wound cultures growing panresistant/CRPA and ESBL Klebsiella pneumoniae.  Infectious Disease service feels that this has predominantly colonization and chronic infection, and recommend discontinuation of current antibiotic regimen.  Coronary artery disease with previous history of high-degree AV block and VFib arrest, post permanent pacer/AICD  Reported daily vomiting episodes at nursing home over the past week, " no further vomiting greater than 48 hours, tolerating PEG feedings.  Chronic anemia, post transfusion 2 units PRBCs on arrival to ER.  No evidence of active bleeding  Hypertension    Plan:     Continue current baseline mechanical ventilatory settings  Continue enteral feedings per PEG, GI following.  As above, he is now tolerating.  As per Wound Care recommendations, actively following patient.  Will discontinue current IV antibiotic regimen at Infectious Disease recommendation, as infection appears predominantly colonization/chronic.  He should be ready for transfer back to ventilator nursing home at this point.     35 minutes of critical care was time spent personally by me on the following activities: development of treatment plan with patient or surrogate and bedside caregivers, discussions with consultants, evaluation of patient's response to treatment, examination of patient, ordering and performing treatments and interventions, ordering and review of laboratory studies, ordering and review of radiographic studies, pulse oximetry, re-evaluation of patient's condition.  This patient demonstrates a high probability for further clinical decompensation due to ongoing critical illness.  Critical care time did not overlap with that of any other provider or involve time for any procedures.       Miriam Hutchins MD, [facility]  Pulmonary/Critical Care

## 2025-03-05 VITALS
HEIGHT: 67 IN | OXYGEN SATURATION: 99 % | WEIGHT: 151.44 LBS | HEART RATE: 83 BPM | BODY MASS INDEX: 23.77 KG/M2 | TEMPERATURE: 98 F | SYSTOLIC BLOOD PRESSURE: 108 MMHG | RESPIRATION RATE: 11 BRPM | DIASTOLIC BLOOD PRESSURE: 82 MMHG

## 2025-03-05 PROCEDURE — 27100171 HC OXYGEN HIGH FLOW UP TO 24 HOURS

## 2025-03-05 PROCEDURE — 27000186 HC CIRCUIT, HFJV

## 2025-03-05 PROCEDURE — 99900031 HC PATIENT EDUCATION (STAT)

## 2025-03-05 PROCEDURE — 63600175 PHARM REV CODE 636 W HCPCS: Performed by: STUDENT IN AN ORGANIZED HEALTH CARE EDUCATION/TRAINING PROGRAM

## 2025-03-05 PROCEDURE — 94761 N-INVAS EAR/PLS OXIMETRY MLT: CPT

## 2025-03-05 PROCEDURE — 25000003 PHARM REV CODE 250: Performed by: STUDENT IN AN ORGANIZED HEALTH CARE EDUCATION/TRAINING PROGRAM

## 2025-03-05 PROCEDURE — 99900035 HC TECH TIME PER 15 MIN (STAT)

## 2025-03-05 PROCEDURE — 94003 VENT MGMT INPAT SUBQ DAY: CPT

## 2025-03-05 PROCEDURE — 99900022

## 2025-03-05 RX ORDER — VENLAFAXINE 75 MG/1
75 TABLET ORAL 2 TIMES DAILY
Qty: 60 TABLET | Refills: 11 | Status: SHIPPED | OUTPATIENT
Start: 2025-03-05 | End: 2025-03-05 | Stop reason: HOSPADM

## 2025-03-05 RX ADMIN — BUSPIRONE HYDROCHLORIDE 5 MG: 5 TABLET ORAL at 07:03

## 2025-03-05 RX ADMIN — CHOLESTYRAMINE 4 GRAMS OF ANHYDROUS CHOLESTYRAMINE: 4 POWDER, FOR SUSPENSION ORAL at 07:03

## 2025-03-05 RX ADMIN — PANTOPRAZOLE SODIUM 40 MG: 40 INJECTION, POWDER, LYOPHILIZED, FOR SOLUTION INTRAVENOUS at 07:03

## 2025-03-05 RX ADMIN — SUCRALFATE 1 G: 1 TABLET ORAL at 06:03

## 2025-03-05 RX ADMIN — METOPROLOL TARTRATE 25 MG: 25 TABLET, FILM COATED ORAL at 07:03

## 2025-03-05 RX ADMIN — QUETIAPINE FUMARATE 25 MG: 25 TABLET ORAL at 07:03

## 2025-03-05 RX ADMIN — AMIODARONE HYDROCHLORIDE 200 MG: 200 TABLET ORAL at 07:03

## 2025-03-05 RX ADMIN — FINASTERIDE 5 MG: 5 TABLET, FILM COATED ORAL at 07:03

## 2025-03-05 RX ADMIN — SUCRALFATE 1 G: 1 TABLET ORAL at 10:03

## 2025-03-05 RX ADMIN — VENLAFAXINE 75 MG: 37.5 TABLET ORAL at 07:03

## 2025-03-05 RX ADMIN — ATORVASTATIN CALCIUM 10 MG: 10 TABLET, FILM COATED ORAL at 07:03

## 2025-03-05 NOTE — DISCHARGE SUMMARY
Admit date:  02/27/25  Discharge date:  03/05/25      Discharge diagnoses:  Hemorrhagic stroke 2023, remains nonverbal and bedridden since that time, chronic nursing home resident of [facility].  Chronic respiratory failure secondary to above, on permanent mechanical ventilatory support at nursing home  Stage IV sacral decubitus, post I&D 02/28/2025.   Coronary artery disease with previous history of high-degree AV block and VFib arrest, post permanent pacer/AICD  Nausea/vomiting with PEG feeding intolerance, resolved  Anemia of chronic illness  Hypertension    Hospital course:  Patient received transfusion of 2 units PRBCs after arrival, no further transfusions during this hospital stay.  He demonstrated no evidence of GI bleeding entire hospital stay.  Wound care service performed sacral wound debridement.  Tissue cultures grew many Pseudomonas aeruginosa (pan resistant) and many Klebsiella pneumoniae (sensitive only to meropenem).  Infectious Disease service managed antibiotic regimen, and discontinued all antibiotics feeling that no further antibiotics were indicated at this point forward.  They recommended continued local wound care only.  He continues tolerating his PEG feedings well during hospital stay.  He has felt ready for transfer back to nursing home at this point.

## 2025-03-05 NOTE — PLAN OF CARE
DARIANA sent clinical updates to Patel at [facility] via [facility] Fax. Anika at [facility] notified patient may return today if medically stable.

## 2025-03-05 NOTE — PLAN OF CARE
03/05/25 1249   Final Note   Assessment Type Final Discharge Note   Anticipated Discharge Disposition [facility]   Post-Acute Status   Post-Acute Authorization Placement   Post-Acute Placement Status Set-up Complete/Auth obtained   Discharge Delays None known at this time     Patient is discharged back to [facility]. Transport set up with [facility] for 2:30 pm. Gave nurse packet with information for report. Sent d/c information to NH via [facility]

## 2025-03-05 NOTE — NURSING
Report given to Kuldeep @ [facility]. Ivs x3 removed per request. Gennaro removed. Patient transported via [facility] to [facility]. Beth notified of transfer.

## 2025-03-05 NOTE — PLAN OF CARE
Problem: Fall Injury Risk  Goal: Absence of Fall and Fall-Related Injury  Outcome: Progressing     Problem: Infection  Goal: Absence of Infection Signs and Symptoms  Outcome: Not Progressing     Problem: Adult Inpatient Plan of Care  Goal: Patient-Specific Goal (Individualized)  Outcome: Not Progressing     Problem: Sepsis/Septic Shock  Goal: Optimal Coping  Outcome: Not Progressing     Problem: Wound  Goal: Optimal Coping  Outcome: Not Progressing     Problem: Skin Injury Risk Increased  Goal: Skin Health and Integrity  Outcome: Not Progressing

## 2025-03-05 NOTE — PLAN OF CARE
Problem: Infection  Goal: Absence of Infection Signs and Symptoms  Outcome: Met     Problem: Adult Inpatient Plan of Care  Goal: Plan of Care Review  Outcome: Met  Goal: Patient-Specific Goal (Individualized)  Outcome: Met  Goal: Absence of Hospital-Acquired Illness or Injury  Outcome: Met  Goal: Optimal Comfort and Wellbeing  Outcome: Met  Goal: Readiness for Transition of Care  Outcome: Met     Problem: Sepsis/Septic Shock  Goal: Optimal Coping  Outcome: Met  Goal: Absence of Bleeding  Outcome: Met  Goal: Blood Glucose Level Within Targeted Range  Outcome: Met  Goal: Absence of Infection Signs and Symptoms  Outcome: Met  Goal: Optimal Nutrition Intake  Outcome: Met     Problem: Pneumonia  Goal: Fluid Balance  Outcome: Met  Goal: Resolution of Infection Signs and Symptoms  Outcome: Met  Goal: Effective Oxygenation and Ventilation  Outcome: Met     Problem: Wound  Goal: Optimal Coping  Outcome: Met  Goal: Optimal Functional Ability  Outcome: Met  Goal: Absence of Infection Signs and Symptoms  Outcome: Met  Goal: Improved Oral Intake  Outcome: Met  Goal: Optimal Pain Control and Function  Outcome: Met  Goal: Skin Health and Integrity  Outcome: Met  Goal: Optimal Wound Healing  Outcome: Met     Problem: Neutropenia  Goal: Absence of Infection  Outcome: Met     Problem: Skin Injury Risk Increased  Goal: Skin Health and Integrity  Outcome: Met     Problem: Fall Injury Risk  Goal: Absence of Fall and Fall-Related Injury  Outcome: Met     Problem: Delirium  Goal: Optimal Coping  Outcome: Met  Goal: Improved Behavioral Control  Outcome: Met  Goal: Improved Attention and Thought Clarity  Outcome: Met  Goal: Improved Sleep  Outcome: Met

## 2025-03-06 ENCOUNTER — LAB REQUISITION (OUTPATIENT)
Dept: LAB | Facility: HOSPITAL | Age: 70
End: 2025-03-06
Payer: MEDICARE

## 2025-03-06 DIAGNOSIS — R79.9 ABNORMAL FINDING OF BLOOD CHEMISTRY, UNSPECIFIED: ICD-10-CM

## 2025-03-06 LAB
25(OH)D3+25(OH)D2 SERPL-MCNC: 33 NG/ML (ref 30–80)
ALBUMIN SERPL-MCNC: 2.3 G/DL (ref 3.4–4.8)
ALBUMIN/GLOB SERPL: 0.6 RATIO (ref 1.1–2)
ALP SERPL-CCNC: 80 UNIT/L (ref 40–150)
ALT SERPL-CCNC: 7 UNIT/L (ref 0–55)
ANION GAP SERPL CALC-SCNC: 9 MEQ/L
AST SERPL-CCNC: 11 UNIT/L (ref 5–34)
BASOPHILS # BLD AUTO: 0.04 X10(3)/MCL
BASOPHILS NFR BLD AUTO: 0.4 %
BILIRUB SERPL-MCNC: 0.8 MG/DL
BUN SERPL-MCNC: 18.5 MG/DL (ref 8.4–25.7)
CALCIUM SERPL-MCNC: 8 MG/DL (ref 8.8–10)
CHLORIDE SERPL-SCNC: 110 MMOL/L (ref 98–107)
CHOLEST SERPL-MCNC: 67 MG/DL
CHOLEST/HDLC SERPL: 2 {RATIO} (ref 0–5)
CO2 SERPL-SCNC: 22 MMOL/L (ref 23–31)
CREAT SERPL-MCNC: 0.56 MG/DL (ref 0.72–1.25)
CREAT/UREA NIT SERPL: 33
EOSINOPHIL # BLD AUTO: 0.08 X10(3)/MCL (ref 0–0.9)
EOSINOPHIL NFR BLD AUTO: 0.8 %
ERYTHROCYTE [DISTWIDTH] IN BLOOD BY AUTOMATED COUNT: 20.1 % (ref 11.5–17)
FERRITIN SERPL-MCNC: 418.25 NG/ML (ref 21.81–274.66)
GFR SERPLBLD CREATININE-BSD FMLA CKD-EPI: >60 ML/MIN/1.73/M2
GLOBULIN SER-MCNC: 4.1 GM/DL (ref 2.4–3.5)
GLUCOSE SERPL-MCNC: 92 MG/DL (ref 82–115)
HCT VFR BLD AUTO: 31.3 % (ref 42–52)
HDLC SERPL-MCNC: 31 MG/DL (ref 35–60)
HGB BLD-MCNC: 9.4 G/DL (ref 14–18)
IMM GRANULOCYTES # BLD AUTO: 0.04 X10(3)/MCL (ref 0–0.04)
IMM GRANULOCYTES NFR BLD AUTO: 0.4 %
IRON SATN MFR SERPL: 10 % (ref 20–50)
IRON SERPL-MCNC: 19 UG/DL (ref 65–175)
LDLC SERPL CALC-MCNC: 23 MG/DL (ref 50–140)
LYMPHOCYTES # BLD AUTO: 1.04 X10(3)/MCL (ref 0.6–4.6)
LYMPHOCYTES NFR BLD AUTO: 10.2 %
MAGNESIUM SERPL-MCNC: 1.8 MG/DL (ref 1.6–2.6)
MCH RBC QN AUTO: 27.6 PG (ref 27–31)
MCHC RBC AUTO-ENTMCNC: 30 G/DL (ref 33–36)
MCV RBC AUTO: 92.1 FL (ref 80–94)
MONOCYTES # BLD AUTO: 0.57 X10(3)/MCL (ref 0.1–1.3)
MONOCYTES NFR BLD AUTO: 5.6 %
NEUTROPHILS # BLD AUTO: 8.42 X10(3)/MCL (ref 2.1–9.2)
NEUTROPHILS NFR BLD AUTO: 82.6 %
NRBC BLD AUTO-RTO: 0 %
PLATELET # BLD AUTO: 239 X10(3)/MCL (ref 130–400)
PMV BLD AUTO: 10.3 FL (ref 7.4–10.4)
POTASSIUM SERPL-SCNC: 3.4 MMOL/L (ref 3.5–5.1)
PREALB SERPL-MCNC: 13.5 MG/DL (ref 16–42)
PROT SERPL-MCNC: 6.4 GM/DL (ref 5.8–7.6)
RBC # BLD AUTO: 3.4 X10(6)/MCL (ref 4.7–6.1)
SODIUM SERPL-SCNC: 141 MMOL/L (ref 136–145)
TIBC SERPL-MCNC: 165 UG/DL (ref 60–240)
TIBC SERPL-MCNC: 184 UG/DL (ref 250–450)
TRANSFERRIN SERPL-MCNC: 172 MG/DL (ref 163–344)
TRIGL SERPL-MCNC: 63 MG/DL (ref 34–140)
TSH SERPL-ACNC: 6.58 UIU/ML (ref 0.35–4.94)
VLDLC SERPL CALC-MCNC: 13 MG/DL
WBC # BLD AUTO: 10.19 X10(3)/MCL (ref 4.5–11.5)

## 2025-03-06 PROCEDURE — 82306 VITAMIN D 25 HYDROXY: CPT | Performed by: INTERNAL MEDICINE

## 2025-03-06 PROCEDURE — 82728 ASSAY OF FERRITIN: CPT | Performed by: INTERNAL MEDICINE

## 2025-03-06 PROCEDURE — 83735 ASSAY OF MAGNESIUM: CPT | Performed by: INTERNAL MEDICINE

## 2025-03-06 PROCEDURE — 83540 ASSAY OF IRON: CPT | Performed by: INTERNAL MEDICINE

## 2025-03-06 PROCEDURE — 84443 ASSAY THYROID STIM HORMONE: CPT | Performed by: INTERNAL MEDICINE

## 2025-03-06 PROCEDURE — 80053 COMPREHEN METABOLIC PANEL: CPT | Performed by: INTERNAL MEDICINE

## 2025-03-06 PROCEDURE — 80061 LIPID PANEL: CPT | Performed by: INTERNAL MEDICINE

## 2025-03-06 PROCEDURE — 84134 ASSAY OF PREALBUMIN: CPT | Performed by: INTERNAL MEDICINE

## 2025-03-06 PROCEDURE — 80177 DRUG SCRN QUAN LEVETIRACETAM: CPT | Performed by: INTERNAL MEDICINE

## 2025-03-06 PROCEDURE — 85025 COMPLETE CBC W/AUTO DIFF WBC: CPT | Performed by: INTERNAL MEDICINE

## 2025-03-06 NOTE — PHYSICIAN QUERY
Please clarify the nutritional diagnosis associated with the clinical findings:    Severe protein calorie malnutrition

## 2025-03-07 ENCOUNTER — LAB REQUISITION (OUTPATIENT)
Dept: LAB | Facility: HOSPITAL | Age: 70
End: 2025-03-07
Payer: MEDICARE

## 2025-03-07 DIAGNOSIS — L89.154 PRESSURE ULCER OF SACRAL REGION, STAGE 4: ICD-10-CM

## 2025-03-07 LAB
ALBUMIN SERPL-MCNC: 2.3 G/DL (ref 3.4–4.8)
ALBUMIN/GLOB SERPL: 0.5 RATIO (ref 1.1–2)
ALP SERPL-CCNC: 78 UNIT/L (ref 40–150)
ALT SERPL-CCNC: 8 UNIT/L (ref 0–55)
ANION GAP SERPL CALC-SCNC: 8 MEQ/L
AST SERPL-CCNC: 19 UNIT/L (ref 5–34)
BASOPHILS # BLD AUTO: 0.03 X10(3)/MCL
BASOPHILS NFR BLD AUTO: 0.6 %
BILIRUB SERPL-MCNC: 1 MG/DL
BUN SERPL-MCNC: 14.8 MG/DL (ref 8.4–25.7)
CALCIUM SERPL-MCNC: 8.2 MG/DL (ref 8.8–10)
CHLORIDE SERPL-SCNC: 110 MMOL/L (ref 98–107)
CO2 SERPL-SCNC: 23 MMOL/L (ref 23–31)
CREAT SERPL-MCNC: 0.59 MG/DL (ref 0.72–1.25)
CREAT/UREA NIT SERPL: 25
CRP SERPL-MCNC: 60.7 MG/L
EOSINOPHIL # BLD AUTO: 0.06 X10(3)/MCL (ref 0–0.9)
EOSINOPHIL NFR BLD AUTO: 1.1 %
ERYTHROCYTE [DISTWIDTH] IN BLOOD BY AUTOMATED COUNT: 19.9 % (ref 11.5–17)
ERYTHROCYTE [SEDIMENTATION RATE] IN BLOOD: 51 MM/HR (ref 0–15)
GFR SERPLBLD CREATININE-BSD FMLA CKD-EPI: >60 ML/MIN/1.73/M2
GLOBULIN SER-MCNC: 4.3 GM/DL (ref 2.4–3.5)
GLUCOSE SERPL-MCNC: 81 MG/DL (ref 82–115)
HCT VFR BLD AUTO: 29.8 % (ref 42–52)
HGB BLD-MCNC: 9.1 G/DL (ref 14–18)
IMM GRANULOCYTES # BLD AUTO: 0.02 X10(3)/MCL (ref 0–0.04)
IMM GRANULOCYTES NFR BLD AUTO: 0.4 %
LEVETIRACETAM SERPL-MCNC: <1 MCG/ML (ref 10–40)
LYMPHOCYTES # BLD AUTO: 1.13 X10(3)/MCL (ref 0.6–4.6)
LYMPHOCYTES NFR BLD AUTO: 21 %
MCH RBC QN AUTO: 27.9 PG (ref 27–31)
MCHC RBC AUTO-ENTMCNC: 30.5 G/DL (ref 33–36)
MCV RBC AUTO: 91.4 FL (ref 80–94)
MONOCYTES # BLD AUTO: 0.42 X10(3)/MCL (ref 0.1–1.3)
MONOCYTES NFR BLD AUTO: 7.8 %
NEUTROPHILS # BLD AUTO: 3.73 X10(3)/MCL (ref 2.1–9.2)
NEUTROPHILS NFR BLD AUTO: 69.1 %
NRBC BLD AUTO-RTO: 0 %
PLATELET # BLD AUTO: 194 X10(3)/MCL (ref 130–400)
PMV BLD AUTO: 11 FL (ref 7.4–10.4)
POTASSIUM SERPL-SCNC: 3.7 MMOL/L (ref 3.5–5.1)
PREALB SERPL-MCNC: 13.3 MG/DL (ref 16–42)
PROT SERPL-MCNC: 6.6 GM/DL (ref 5.8–7.6)
RBC # BLD AUTO: 3.26 X10(6)/MCL (ref 4.7–6.1)
SODIUM SERPL-SCNC: 141 MMOL/L (ref 136–145)
WBC # BLD AUTO: 5.39 X10(3)/MCL (ref 4.5–11.5)

## 2025-03-07 PROCEDURE — 85025 COMPLETE CBC W/AUTO DIFF WBC: CPT | Performed by: INTERNAL MEDICINE

## 2025-03-07 PROCEDURE — 85652 RBC SED RATE AUTOMATED: CPT | Performed by: INTERNAL MEDICINE

## 2025-03-07 PROCEDURE — 84134 ASSAY OF PREALBUMIN: CPT | Performed by: INTERNAL MEDICINE

## 2025-03-07 PROCEDURE — 86140 C-REACTIVE PROTEIN: CPT | Performed by: INTERNAL MEDICINE

## 2025-03-07 PROCEDURE — 80053 COMPREHEN METABOLIC PANEL: CPT | Performed by: INTERNAL MEDICINE

## 2025-03-14 ENCOUNTER — LAB REQUISITION (OUTPATIENT)
Dept: LAB | Facility: HOSPITAL | Age: 70
End: 2025-03-14
Payer: MEDICARE

## 2025-03-14 DIAGNOSIS — I63.513 CEREBRAL INFARCTION DUE TO UNSPECIFIED OCCLUSION OR STENOSIS OF BILATERAL MIDDLE CEREBRAL ARTERIES: ICD-10-CM

## 2025-03-14 LAB
ALBUMIN SERPL-MCNC: 2.2 G/DL (ref 3.4–4.8)
ALBUMIN/GLOB SERPL: 0.6 RATIO (ref 1.1–2)
ALP SERPL-CCNC: 89 UNIT/L (ref 40–150)
ALT SERPL-CCNC: 6 UNIT/L (ref 0–55)
ANION GAP SERPL CALC-SCNC: 7 MEQ/L
AST SERPL-CCNC: 11 UNIT/L (ref 5–34)
BASOPHILS # BLD AUTO: 0.05 X10(3)/MCL
BASOPHILS NFR BLD AUTO: 0.9 %
BILIRUB SERPL-MCNC: 0.5 MG/DL
BUN SERPL-MCNC: 15.1 MG/DL (ref 8.4–25.7)
CALCIUM SERPL-MCNC: 8.4 MG/DL (ref 8.8–10)
CHLORIDE SERPL-SCNC: 108 MMOL/L (ref 98–107)
CO2 SERPL-SCNC: 27 MMOL/L (ref 23–31)
CREAT SERPL-MCNC: 0.53 MG/DL (ref 0.72–1.25)
CREAT/UREA NIT SERPL: 28
CRP SERPL HS-MCNC: 54.55 MG/L
EOSINOPHIL # BLD AUTO: 0.06 X10(3)/MCL (ref 0–0.9)
EOSINOPHIL NFR BLD AUTO: 1.1 %
ERYTHROCYTE [DISTWIDTH] IN BLOOD BY AUTOMATED COUNT: 18.1 % (ref 11.5–17)
ERYTHROCYTE [SEDIMENTATION RATE] IN BLOOD: 64 MM/HR (ref 0–15)
GFR SERPLBLD CREATININE-BSD FMLA CKD-EPI: >60 ML/MIN/1.73/M2
GLOBULIN SER-MCNC: 4 GM/DL (ref 2.4–3.5)
GLUCOSE SERPL-MCNC: 87 MG/DL (ref 82–115)
HCT VFR BLD AUTO: 30.2 % (ref 42–52)
HGB BLD-MCNC: 9.1 G/DL (ref 14–18)
IMM GRANULOCYTES # BLD AUTO: 0.04 X10(3)/MCL (ref 0–0.04)
IMM GRANULOCYTES NFR BLD AUTO: 0.7 %
LYMPHOCYTES # BLD AUTO: 1.44 X10(3)/MCL (ref 0.6–4.6)
LYMPHOCYTES NFR BLD AUTO: 26.3 %
MCH RBC QN AUTO: 27.4 PG (ref 27–31)
MCHC RBC AUTO-ENTMCNC: 30.1 G/DL (ref 33–36)
MCV RBC AUTO: 91 FL (ref 80–94)
MONOCYTES # BLD AUTO: 0.47 X10(3)/MCL (ref 0.1–1.3)
MONOCYTES NFR BLD AUTO: 8.6 %
NEUTROPHILS # BLD AUTO: 3.41 X10(3)/MCL (ref 2.1–9.2)
NEUTROPHILS NFR BLD AUTO: 62.4 %
NRBC BLD AUTO-RTO: 0 %
PLATELET # BLD AUTO: 292 X10(3)/MCL (ref 130–400)
PMV BLD AUTO: 10.1 FL (ref 7.4–10.4)
POTASSIUM SERPL-SCNC: 3.5 MMOL/L (ref 3.5–5.1)
PROT SERPL-MCNC: 6.2 GM/DL (ref 5.8–7.6)
RBC # BLD AUTO: 3.32 X10(6)/MCL (ref 4.7–6.1)
SODIUM SERPL-SCNC: 142 MMOL/L (ref 136–145)
WBC # BLD AUTO: 5.47 X10(3)/MCL (ref 4.5–11.5)

## 2025-03-14 PROCEDURE — 85652 RBC SED RATE AUTOMATED: CPT | Performed by: INTERNAL MEDICINE

## 2025-03-14 PROCEDURE — 85025 COMPLETE CBC W/AUTO DIFF WBC: CPT | Performed by: INTERNAL MEDICINE

## 2025-03-14 PROCEDURE — 86141 C-REACTIVE PROTEIN HS: CPT | Performed by: INTERNAL MEDICINE

## 2025-03-14 PROCEDURE — 80053 COMPREHEN METABOLIC PANEL: CPT | Performed by: INTERNAL MEDICINE

## 2025-03-21 ENCOUNTER — LAB REQUISITION (OUTPATIENT)
Dept: LAB | Facility: HOSPITAL | Age: 70
End: 2025-03-21
Payer: MEDICARE

## 2025-03-21 DIAGNOSIS — I63.513 CEREBRAL INFARCTION DUE TO UNSPECIFIED OCCLUSION OR STENOSIS OF BILATERAL MIDDLE CEREBRAL ARTERIES: ICD-10-CM

## 2025-03-21 LAB
ALBUMIN SERPL-MCNC: 2.5 G/DL (ref 3.4–4.8)
ALBUMIN/GLOB SERPL: 0.6 RATIO (ref 1.1–2)
ALP SERPL-CCNC: 95 UNIT/L (ref 40–150)
ALT SERPL-CCNC: 11 UNIT/L (ref 0–55)
ANION GAP SERPL CALC-SCNC: 10 MEQ/L
AST SERPL-CCNC: 26 UNIT/L (ref 11–45)
BASOPHILS # BLD AUTO: 0.04 X10(3)/MCL
BASOPHILS NFR BLD AUTO: 0.4 %
BILIRUB SERPL-MCNC: 0.9 MG/DL
BUN SERPL-MCNC: 16 MG/DL (ref 8.4–25.7)
CALCIUM SERPL-MCNC: 8.3 MG/DL (ref 8.8–10)
CHLORIDE SERPL-SCNC: 110 MMOL/L (ref 98–107)
CO2 SERPL-SCNC: 23 MMOL/L (ref 23–31)
CREAT SERPL-MCNC: 0.66 MG/DL (ref 0.72–1.25)
CREAT/UREA NIT SERPL: 24
CRP SERPL HS-MCNC: 79.79 MG/L
EOSINOPHIL # BLD AUTO: 0.06 X10(3)/MCL (ref 0–0.9)
EOSINOPHIL NFR BLD AUTO: 0.7 %
ERYTHROCYTE [DISTWIDTH] IN BLOOD BY AUTOMATED COUNT: 17.8 % (ref 11.5–17)
ERYTHROCYTE [SEDIMENTATION RATE] IN BLOOD: 66 MM/HR (ref 0–15)
GFR SERPLBLD CREATININE-BSD FMLA CKD-EPI: >60 ML/MIN/1.73/M2
GLOBULIN SER-MCNC: 4.5 GM/DL (ref 2.4–3.5)
GLUCOSE SERPL-MCNC: 88 MG/DL (ref 82–115)
HCT VFR BLD AUTO: 33.7 % (ref 42–52)
HGB BLD-MCNC: 10.3 G/DL (ref 14–18)
IMM GRANULOCYTES # BLD AUTO: 0.03 X10(3)/MCL (ref 0–0.04)
IMM GRANULOCYTES NFR BLD AUTO: 0.3 %
LYMPHOCYTES # BLD AUTO: 1.6 X10(3)/MCL (ref 0.6–4.6)
LYMPHOCYTES NFR BLD AUTO: 17.7 %
MCH RBC QN AUTO: 27.1 PG (ref 27–31)
MCHC RBC AUTO-ENTMCNC: 30.6 G/DL (ref 33–36)
MCV RBC AUTO: 88.7 FL (ref 80–94)
MONOCYTES # BLD AUTO: 0.51 X10(3)/MCL (ref 0.1–1.3)
MONOCYTES NFR BLD AUTO: 5.6 %
NEUTROPHILS # BLD AUTO: 6.81 X10(3)/MCL (ref 2.1–9.2)
NEUTROPHILS NFR BLD AUTO: 75.3 %
NRBC BLD AUTO-RTO: 0 %
PLATELET # BLD AUTO: 357 X10(3)/MCL (ref 130–400)
PMV BLD AUTO: 9.5 FL (ref 7.4–10.4)
POTASSIUM SERPL-SCNC: 3.4 MMOL/L (ref 3.5–5.1)
PROT SERPL-MCNC: 7 GM/DL (ref 5.8–7.6)
RBC # BLD AUTO: 3.8 X10(6)/MCL (ref 4.7–6.1)
SODIUM SERPL-SCNC: 143 MMOL/L (ref 136–145)
WBC # BLD AUTO: 9.05 X10(3)/MCL (ref 4.5–11.5)

## 2025-03-21 PROCEDURE — 85652 RBC SED RATE AUTOMATED: CPT | Performed by: INTERNAL MEDICINE

## 2025-03-21 PROCEDURE — 85025 COMPLETE CBC W/AUTO DIFF WBC: CPT | Performed by: INTERNAL MEDICINE

## 2025-03-21 PROCEDURE — 80053 COMPREHEN METABOLIC PANEL: CPT | Performed by: INTERNAL MEDICINE

## 2025-03-21 PROCEDURE — 86141 C-REACTIVE PROTEIN HS: CPT | Performed by: INTERNAL MEDICINE

## 2025-04-07 ENCOUNTER — LAB REQUISITION (OUTPATIENT)
Dept: LAB | Facility: HOSPITAL | Age: 70
End: 2025-04-07
Payer: MEDICARE

## 2025-04-07 DIAGNOSIS — I63.513 CEREBRAL INFARCTION DUE TO UNSPECIFIED OCCLUSION OR STENOSIS OF BILATERAL MIDDLE CEREBRAL ARTERIES: ICD-10-CM

## 2025-04-07 LAB
ALBUMIN SERPL-MCNC: 2.3 G/DL (ref 3.4–4.8)
ALBUMIN/GLOB SERPL: 0.5 RATIO (ref 1.1–2)
ALP SERPL-CCNC: 89 UNIT/L (ref 40–150)
ALT SERPL-CCNC: 8 UNIT/L (ref 0–55)
ANION GAP SERPL CALC-SCNC: 12 MEQ/L
AST SERPL-CCNC: 12 UNIT/L (ref 11–45)
BILIRUB SERPL-MCNC: 0.6 MG/DL
BUN SERPL-MCNC: 18.9 MG/DL (ref 8.4–25.7)
CALCIUM SERPL-MCNC: 8.5 MG/DL (ref 8.8–10)
CHLORIDE SERPL-SCNC: 102 MMOL/L (ref 98–107)
CO2 SERPL-SCNC: 28 MMOL/L (ref 23–31)
CREAT SERPL-MCNC: 0.56 MG/DL (ref 0.72–1.25)
CREAT/UREA NIT SERPL: 34
GFR SERPLBLD CREATININE-BSD FMLA CKD-EPI: >60 ML/MIN/1.73/M2
GLOBULIN SER-MCNC: 4.3 GM/DL (ref 2.4–3.5)
GLUCOSE SERPL-MCNC: 93 MG/DL (ref 82–115)
POTASSIUM SERPL-SCNC: 3.1 MMOL/L (ref 3.5–5.1)
PREALB SERPL-MCNC: 13.9 MG/DL (ref 16–42)
PROT SERPL-MCNC: 6.6 GM/DL (ref 5.8–7.6)
SODIUM SERPL-SCNC: 142 MMOL/L (ref 136–145)

## 2025-04-07 PROCEDURE — 84134 ASSAY OF PREALBUMIN: CPT | Performed by: INTERNAL MEDICINE

## 2025-04-07 PROCEDURE — 80053 COMPREHEN METABOLIC PANEL: CPT | Performed by: INTERNAL MEDICINE

## 2025-04-22 ENCOUNTER — LAB REQUISITION (OUTPATIENT)
Dept: LAB | Facility: HOSPITAL | Age: 70
End: 2025-04-22
Payer: MEDICARE

## 2025-04-22 DIAGNOSIS — E87.6 HYPOKALEMIA: ICD-10-CM

## 2025-04-22 LAB
ANION GAP SERPL CALC-SCNC: 9 MEQ/L
BUN SERPL-MCNC: 23.5 MG/DL (ref 8.4–25.7)
CALCIUM SERPL-MCNC: 8.1 MG/DL (ref 8.8–10)
CHLORIDE SERPL-SCNC: 104 MMOL/L (ref 98–107)
CO2 SERPL-SCNC: 27 MMOL/L (ref 23–31)
CREAT SERPL-MCNC: 0.65 MG/DL (ref 0.72–1.25)
CREAT/UREA NIT SERPL: 36
GFR SERPLBLD CREATININE-BSD FMLA CKD-EPI: >60 ML/MIN/1.73/M2
GLUCOSE SERPL-MCNC: 85 MG/DL (ref 82–115)
MAGNESIUM SERPL-MCNC: 2.1 MG/DL (ref 1.6–2.6)
POTASSIUM SERPL-SCNC: 3.2 MMOL/L (ref 3.5–5.1)
SODIUM SERPL-SCNC: 140 MMOL/L (ref 136–145)

## 2025-04-22 PROCEDURE — 83735 ASSAY OF MAGNESIUM: CPT | Performed by: INTERNAL MEDICINE

## 2025-04-22 PROCEDURE — 80048 BASIC METABOLIC PNL TOTAL CA: CPT | Performed by: INTERNAL MEDICINE

## 2025-04-30 ENCOUNTER — LAB REQUISITION (OUTPATIENT)
Dept: LAB | Facility: HOSPITAL | Age: 70
End: 2025-04-30
Payer: MEDICARE

## 2025-04-30 DIAGNOSIS — I63.513 CEREBRAL INFARCTION DUE TO UNSPECIFIED OCCLUSION OR STENOSIS OF BILATERAL MIDDLE CEREBRAL ARTERIES: ICD-10-CM

## 2025-04-30 LAB
ALBUMIN SERPL-MCNC: 2.6 G/DL (ref 3.4–4.8)
ALBUMIN/GLOB SERPL: 0.6 RATIO (ref 1.1–2)
ALP SERPL-CCNC: 97 UNIT/L (ref 40–150)
ALT SERPL-CCNC: 13 UNIT/L (ref 0–55)
ANION GAP SERPL CALC-SCNC: 12 MEQ/L
AST SERPL-CCNC: 14 UNIT/L (ref 11–45)
BILIRUB SERPL-MCNC: 0.6 MG/DL
BUN SERPL-MCNC: 25.5 MG/DL (ref 8.4–25.7)
CALCIUM SERPL-MCNC: 8.4 MG/DL (ref 8.8–10)
CHLORIDE SERPL-SCNC: 106 MMOL/L (ref 98–107)
CO2 SERPL-SCNC: 25 MMOL/L (ref 23–31)
CREAT SERPL-MCNC: 0.65 MG/DL (ref 0.72–1.25)
CREAT/UREA NIT SERPL: 39
GFR SERPLBLD CREATININE-BSD FMLA CKD-EPI: >60 ML/MIN/1.73/M2
GLOBULIN SER-MCNC: 4.3 GM/DL (ref 2.4–3.5)
GLUCOSE SERPL-MCNC: 71 MG/DL (ref 82–115)
MAGNESIUM SERPL-MCNC: 2.1 MG/DL (ref 1.6–2.6)
POTASSIUM SERPL-SCNC: 3.1 MMOL/L (ref 3.5–5.1)
PROT SERPL-MCNC: 6.9 GM/DL (ref 5.8–7.6)
SODIUM SERPL-SCNC: 143 MMOL/L (ref 136–145)

## 2025-04-30 PROCEDURE — 83735 ASSAY OF MAGNESIUM: CPT | Performed by: INTERNAL MEDICINE

## 2025-04-30 PROCEDURE — 80053 COMPREHEN METABOLIC PANEL: CPT | Performed by: INTERNAL MEDICINE

## 2025-05-02 ENCOUNTER — HOSPITAL ENCOUNTER (EMERGENCY)
Facility: HOSPITAL | Age: 70
Discharge: HOME | End: 2025-05-02
Attending: EMERGENCY MEDICINE
Payer: MEDICARE

## 2025-05-02 VITALS
TEMPERATURE: 99 F | SYSTOLIC BLOOD PRESSURE: 101 MMHG | HEART RATE: 91 BPM | OXYGEN SATURATION: 100 % | RESPIRATION RATE: 31 BRPM | DIASTOLIC BLOOD PRESSURE: 61 MMHG

## 2025-05-02 DIAGNOSIS — Z98.890 HISTORY OF TRACHEOSTOMY: Primary | ICD-10-CM

## 2025-05-02 DIAGNOSIS — Z86.79 HISTORY OF ATRIAL FIBRILLATION: ICD-10-CM

## 2025-05-02 LAB
ALBUMIN SERPL-MCNC: 2.5 G/DL (ref 3.4–4.8)
ALBUMIN/GLOB SERPL: 0.5 RATIO (ref 1.1–2)
ALP SERPL-CCNC: 97 UNIT/L (ref 40–150)
ALT SERPL-CCNC: 10 UNIT/L (ref 0–55)
ANION GAP SERPL CALC-SCNC: 11 MEQ/L
AST SERPL-CCNC: 16 UNIT/L (ref 11–45)
BACTERIA #/AREA URNS AUTO: ABNORMAL /HPF
BASOPHILS # BLD AUTO: 0.05 X10(3)/MCL
BASOPHILS NFR BLD AUTO: 0.5 %
BILIRUB SERPL-MCNC: 1 MG/DL
BILIRUB UR QL STRIP.AUTO: NEGATIVE
BUN SERPL-MCNC: 28.2 MG/DL (ref 8.4–25.7)
CALCIUM SERPL-MCNC: 8.6 MG/DL (ref 8.8–10)
CHLORIDE SERPL-SCNC: 111 MMOL/L (ref 98–107)
CLARITY UR: ABNORMAL
CO2 SERPL-SCNC: 20 MMOL/L (ref 23–31)
COLOR UR AUTO: YELLOW
CREAT SERPL-MCNC: 0.65 MG/DL (ref 0.72–1.25)
CREAT/UREA NIT SERPL: 43
EOSINOPHIL # BLD AUTO: 0.03 X10(3)/MCL (ref 0–0.9)
EOSINOPHIL NFR BLD AUTO: 0.3 %
ERYTHROCYTE [DISTWIDTH] IN BLOOD BY AUTOMATED COUNT: 17.4 % (ref 11.5–17)
FLUAV AG UPPER RESP QL IA.RAPID: NOT DETECTED
FLUBV AG UPPER RESP QL IA.RAPID: NOT DETECTED
GFR SERPLBLD CREATININE-BSD FMLA CKD-EPI: >60 ML/MIN/1.73/M2
GLOBULIN SER-MCNC: 5.1 GM/DL (ref 2.4–3.5)
GLUCOSE SERPL-MCNC: 94 MG/DL (ref 82–115)
GLUCOSE UR QL STRIP: NORMAL
HCT VFR BLD AUTO: 32.8 % (ref 42–52)
HGB BLD-MCNC: 10.1 G/DL (ref 14–18)
HGB UR QL STRIP: ABNORMAL
IMM GRANULOCYTES # BLD AUTO: 0.07 X10(3)/MCL (ref 0–0.04)
IMM GRANULOCYTES NFR BLD AUTO: 0.7 %
KETONES UR QL STRIP: NEGATIVE
LACTATE SERPL-SCNC: 1.2 MMOL/L (ref 0.5–2.2)
LEUKOCYTE ESTERASE UR QL STRIP: 500
LYMPHOCYTES # BLD AUTO: 1.73 X10(3)/MCL (ref 0.6–4.6)
LYMPHOCYTES NFR BLD AUTO: 16.2 %
MCH RBC QN AUTO: 26.6 PG (ref 27–31)
MCHC RBC AUTO-ENTMCNC: 30.8 G/DL (ref 33–36)
MCV RBC AUTO: 86.3 FL (ref 80–94)
MONOCYTES # BLD AUTO: 0.69 X10(3)/MCL (ref 0.1–1.3)
MONOCYTES NFR BLD AUTO: 6.5 %
MUCOUS THREADS URNS QL MICRO: ABNORMAL /LPF
NEUTROPHILS # BLD AUTO: 8.1 X10(3)/MCL (ref 2.1–9.2)
NEUTROPHILS NFR BLD AUTO: 75.8 %
NITRITE UR QL STRIP: NEGATIVE
NRBC BLD AUTO-RTO: 0 %
PH UR STRIP: 5.5 [PH]
PLATELET # BLD AUTO: 285 X10(3)/MCL (ref 130–400)
PMV BLD AUTO: 10.7 FL (ref 7.4–10.4)
POTASSIUM SERPL-SCNC: 3.4 MMOL/L (ref 3.5–5.1)
PROT SERPL-MCNC: 7.6 GM/DL (ref 5.8–7.6)
PROT UR QL STRIP: ABNORMAL
RBC # BLD AUTO: 3.8 X10(6)/MCL (ref 4.7–6.1)
RBC #/AREA URNS AUTO: ABNORMAL /HPF
SARS-COV-2 RNA RESP QL NAA+PROBE: NOT DETECTED
SODIUM SERPL-SCNC: 142 MMOL/L (ref 136–145)
SP GR UR STRIP.AUTO: 1.03 (ref 1–1.03)
SQUAMOUS #/AREA URNS LPF: ABNORMAL /HPF
UROBILINOGEN UR STRIP-ACNC: NORMAL
WBC # BLD AUTO: 10.67 X10(3)/MCL (ref 4.5–11.5)
WBC #/AREA URNS AUTO: >100 /HPF
WBC CLUMPS UR QL AUTO: ABNORMAL

## 2025-05-02 PROCEDURE — 96360 HYDRATION IV INFUSION INIT: CPT

## 2025-05-02 PROCEDURE — 27200966 HC CLOSED SUCTION SYSTEM

## 2025-05-02 PROCEDURE — 80053 COMPREHEN METABOLIC PANEL: CPT | Performed by: PHYSICIAN ASSISTANT

## 2025-05-02 PROCEDURE — 63600175 PHARM REV CODE 636 W HCPCS: Performed by: PHYSICIAN ASSISTANT

## 2025-05-02 PROCEDURE — 87077 CULTURE AEROBIC IDENTIFY: CPT | Performed by: EMERGENCY MEDICINE

## 2025-05-02 PROCEDURE — 94760 N-INVAS EAR/PLS OXIMETRY 1: CPT

## 2025-05-02 PROCEDURE — 0240U COVID/FLU A&B PCR: CPT | Performed by: EMERGENCY MEDICINE

## 2025-05-02 PROCEDURE — 25000003 PHARM REV CODE 250: Performed by: EMERGENCY MEDICINE

## 2025-05-02 PROCEDURE — 99900035 HC TECH TIME PER 15 MIN (STAT)

## 2025-05-02 PROCEDURE — 99900031 HC PATIENT EDUCATION (STAT)

## 2025-05-02 PROCEDURE — 96361 HYDRATE IV INFUSION ADD-ON: CPT

## 2025-05-02 PROCEDURE — 94761 N-INVAS EAR/PLS OXIMETRY MLT: CPT

## 2025-05-02 PROCEDURE — 27100171 HC OXYGEN HIGH FLOW UP TO 24 HOURS

## 2025-05-02 PROCEDURE — 83605 ASSAY OF LACTIC ACID: CPT | Performed by: EMERGENCY MEDICINE

## 2025-05-02 PROCEDURE — 85025 COMPLETE CBC W/AUTO DIFF WBC: CPT | Performed by: PHYSICIAN ASSISTANT

## 2025-05-02 PROCEDURE — 99900026 HC AIRWAY MAINTENANCE (STAT)

## 2025-05-02 PROCEDURE — 99900022

## 2025-05-02 PROCEDURE — 99285 EMERGENCY DEPT VISIT HI MDM: CPT | Mod: 25

## 2025-05-02 PROCEDURE — 81001 URINALYSIS AUTO W/SCOPE: CPT | Performed by: PHYSICIAN ASSISTANT

## 2025-05-02 PROCEDURE — 87186 SC STD MICRODIL/AGAR DIL: CPT | Performed by: PHYSICIAN ASSISTANT

## 2025-05-02 PROCEDURE — 94002 VENT MGMT INPAT INIT DAY: CPT

## 2025-05-02 RX ADMIN — SODIUM CHLORIDE, POTASSIUM CHLORIDE, SODIUM LACTATE AND CALCIUM CHLORIDE 1000 ML: 600; 310; 30; 20 INJECTION, SOLUTION INTRAVENOUS at 02:05

## 2025-05-02 RX ADMIN — SODIUM CHLORIDE 1000 ML: 9 INJECTION, SOLUTION INTRAVENOUS at 05:05

## 2025-05-02 NOTE — ED PROVIDER NOTES
Encounter Date: 5/2/2025       History     Chief Complaint   Patient presents with    Medical evaluation     Pt arrives [facility] from [facility]. Family c/o dehydration & indention in head looks more sunken in than normal. Spoke with Estefany coyne nurse at NH and was unable to give much info since pt was admitted to them today.      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 Patient unable to provide history himself as he is non verbal with a tracheostomy in place. History was obtained via EMS report and speaking with his daughter, Beth Daniel over the phone. Contact number in chart.   Daughter reports that her father has lost a lot of weight in the last few months and she feels like the indention in his head is more sunken than it was previously. She also reports noticing that patient has been having thick secretions from his Trach site. She states that she is unsure if patient has been getting tube feeds at the Nursing home.   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       Review of patient's allergies indicates:  No Known Allergies  Past Medical History:   Diagnosis Date    Arthritis     Atrial fibrillation     BPH (benign prostatic hyperplasia)     Cardiac arrest     Coronary artery disease     Cyst, kidney, acquired     Diverticulosis     Hyperlipidemia     Hypertension     MI (myocardial infarction)     Obesity     Steatosis of liver     Stroke      Past Surgical History:   Procedure Laterality Date    A-V CARDIAC PACEMAKER INSERTION Right     CARDIAC CATHETERIZATION      COLONOSCOPY W/ BIOPSIES       CRANIECTOMY Right 12/20/2023    Procedure: CRANIECTOMY;  Surgeon: Artem Can MD;  Location: [facility];  Service: Neurosurgery;  Laterality: Right;    ESOPHAGOGASTRODUODENOSCOPY W/ PEG N/A 1/2/2024    Procedure: PEG;  Surgeon: Tani Day MD;  Location: [facility] ENDOSCOPY;  Service: Gastroenterology;  Laterality: N/A;    ESOPHAGOGASTRODUODENOSCOPY W/ PEG N/A 10/30/2024    Procedure: EGD w/ Jtube placement;  Surgeon: Gabriele Salcido MD;  Location: [facility] ENDOSCOPY;  Service: Endoscopy;  Laterality: N/A;  with J tube extension    excision of colon      TRACHEOSTOMY N/A 12/29/2023    Procedure: CREATION, TRACHEOSTOMY;  Surgeon: Patricia Winslow MD;  Location: [facility];  Service: ENT;  Laterality: N/A;  REQ 1130 //  NEEDS 2 SCRUBS     Family History   Problem Relation Name Age of Onset    Hypertension Mother      Hypertension Father      Hypertension Sister       Social History[1]  Unable to complete ROS as patient is trach dependent and on vent.    Physical Exam     Initial Vitals   BP Pulse Resp Temp SpO2   05/02/25 1337 05/02/25 1337 05/02/25 1340 05/02/25 1340 05/02/25 1337   99/69 70 16 98.7 °F (37.1 °C) 100 %      MAP       --                Physical Exam    Vitals reviewed.  Constitutional: He is not diaphoretic. No distress.   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   Cardiovascular:  Normal rate, regular rhythm and normal heart sounds.           Pulmonary/Chest: No respiratory distress. He has no wheezes. He has rhonchi. He has no rales.   Mechanically ventilated. Trach in place.   Abdominal: Abdomen is soft. Bowel sounds are normal. He exhibits no distension. There is no abdominal tenderness.   PEG in place   Musculoskeletal:      Comments: Contractures in all extremities. Left sided hemiplagia     Neurological:   Non verbal at baseline   Skin: Skin is warm.         ED Course   Procedures  Labs Reviewed   COMPREHENSIVE METABOLIC PANEL -  Abnormal       Result Value    Sodium 142      Potassium 3.4 (*)     Chloride 111 (*)     CO2 20 (*)     Glucose 94      Blood Urea Nitrogen 28.2 (*)     Creatinine 0.65 (*)     Calcium 8.6 (*)     Protein Total 7.6      Albumin 2.5 (*)     Globulin 5.1 (*)     Albumin/Globulin Ratio 0.5 (*)     Bilirubin Total 1.0      ALP 97      ALT 10      AST 16      eGFR >60      Anion Gap 11.0      BUN/Creatinine Ratio 43     URINALYSIS, REFLEX TO URINE CULTURE - Abnormal    Color, UA Yellow      Appearance, UA Turbid (*)     Specific Gravity, UA 1.031 (*)     pH, UA 5.5      Protein, UA 2+ (*)     Glucose, UA Normal      Ketones, UA Negative      Blood, UA 3+ (*)     Bilirubin, UA Negative      Urobilinogen, UA Normal      Nitrites, UA Negative      Leukocyte Esterase,  (*)     RBC, UA 50-99 (*)     WBC, UA >100 (*)     WBC Clumps, UA Few (*)     Bacteria, UA Trace      Squamous Epithelial Cells, UA Trace      Mucous, UA Trace (*)    CBC WITH DIFFERENTIAL - Abnormal    WBC 10.67      RBC 3.80 (*)     Hgb 10.1 (*)     Hct 32.8 (*)     MCV 86.3      MCH 26.6 (*)     MCHC 30.8 (*)     RDW 17.4 (*)     Platelet 285      MPV 10.7 (*)     Neut % 75.8      Lymph % 16.2      Mono % 6.5      Eos % 0.3      Basophil % 0.5      Imm Grans % 0.7      Neut # 8.10      Lymph # 1.73      Mono # 0.69      Eos # 0.03      Baso # 0.05      Imm Gran # 0.07 (*)     NRBC% 0.0     COVID/FLU A&B PCR - Normal    Influenza A PCR Not Detected      Influenza B PCR Not Detected      SARS-CoV-2 PCR Not Detected      Narrative:     The Xpert Xpress SARS-CoV-2/FLU/RSV plus is a rapid, multiplexed real-time PCR test intended for the simultaneous qualitative detection and differentiation of SARS-CoV-2, Influenza A, Influenza B, and respiratory syncytial virus (RSV) viral RNA in either nasopharyngeal swab or nasal swab specimens.         LACTIC ACID, PLASMA - Normal    Lactic Acid Level 1.2     BLOOD CULTURE [facility]   BLOOD CULTURE [facility]   RESPIRATORY CULTURE  ([facility])   CULTURE, URINE   CBC W/ AUTO DIFFERENTIAL    Narrative:     The following orders were created for panel order CBC auto differential.  Procedure                               Abnormality         Status                     ---------                               -----------         ------                     CBC with Differential[[id number]]       Abnormal            Final result                 Please view results for these tests on the individual orders.          Imaging Results              CT Head Without Contrast (Final result)  Result time 05/02/25 18:57:11      Final result by Nellie Grant MD (05/02/25 18:57:11)                   Impression:      Old postsurgical ischemic changes as outlined above not significantly changed since prior    Sinusitis      Electronically signed by: Erick Grant  Date:    05/02/2025  Time:    18:57               Narrative:    EXAMINATION:  CT HEAD WITHOUT CONTRAST    CLINICAL HISTORY:  Mental status change, unknown cause;    TECHNIQUE:  Multiple axial images were obtained from the base of the brain to the vertex without contrast administration.  Sagittal and coronal reconstructions were performed. .Automatic exposure control  (AEC) is utilized to reduce patient radiation exposure.    COMPARISON:  11/03/2024    FINDINGS:  The patient is status post craniectomy in the right frontal and parietal region with diffuse encephalomalacia is seen throughout the right cerebral hemisphere.  There is ventriculomegaly seen diffusely.  This was seen on the prior examination as well.  No evidence of acute hemorrhage or infarction is seen.  Posterior fossa appears normal.  Visualized portion the paranasal sinuses shows evidence of sphenoid and left maxillary sinusitis.                                       X-Ray Chest AP Portable (Final result)  Result time 05/02/25 14:47:24      Final result by Vik Keen MD (05/02/25 14:47:24)                   Impression:      No  acute chest disease is identified.      Electronically signed by: Vik Keen  Date:    05/02/2025  Time:    14:47               Narrative:    EXAMINATION:  XR CHEST AP PORTABLE    CLINICAL HISTORY:  weaknses;, .    FINDINGS:  No alveolar consolidation, effusion, or pneumothorax is seen.   The thoracic aorta is normal  cardiac silhouette is mildly enlarged, central pulmonary vessels and mediastinum are normal in size and are grossly unremarkable.   visualized osseous structures are grossly unremarkable..    Tracheostomy cannula remains in place                                       Medications   lactated ringers bolus 1,000 mL (0 mLs Intravenous Stopped 5/2/25 1500)   sodium chloride 0.9% bolus 1,000 mL 1,000 mL (0 mLs Intravenous Stopped 5/2/25 1808)     Medical Decision Making    Upon presentation, patient was mildly hypotensive with BP of 99/69. Vitals otherwise stable. Mechanically ventilated    Amount and/or Complexity of Data Reviewed  Labs: ordered. Decision-making details documented in ED Course.  Radiology: ordered. Decision-making details documented in ED Course.               ED Course as of 05/03/25 0030   Fri May 02, 2025   3096 I personally made/approved the management plan for this patient and take responsibility for the patient management. I reviewed the JESSICA/resident resident physician's documentation, agree with the JESSICA/resident's assessment, and I had face to face time with the patient. See my independent MDM below.     My MDM:  ED assessment:  69-year-old male with a history of craniotomy, trach and PEG dependent presents ED for evaluation.  They have noticed that his portion of the head that has not have a bone flap is more depressed than normal and they are concerned that he has not been better hydrated well enough at the nursing home.  They also expressed that his sputum production has increased in his changed in character.  History is limited as patient is nonverbal at  baseline  Pertinent vitals/exam findings:  Vital signs stable  Depression in the right side where there is no bone flap present  Trach in place  Coarse breath sounds, regular rate  Abdomen is soft and nontender peg and left upper quadrant  Contractures in all extremities  Eyes open spontaneously and tracking but nonverbal  DDx:  Dehydration, UTI, pneumonia, electrolyte abnormality  Review of labs/results:  CBC, CMP, lactic and cultures, flu COVID and respiratory panel are pending  My independent EKG/radiology interpretation:  EKG and chest x-ray pending  Discussion of management with other providers:  Resident physician  ED management:  Workup, fluids  ED disposition/plan:  Probable admit  Critical Care: none        Lashon Sterling MD  Emergency Medicine        [BS]   1449 Handoff at time of shift change to dr quintana   [BS]   1544 CBC auto differential(!)  Stable anemia no leukocytosis [MM]   1544 X-Ray Chest AP Portable  Negative for acute [MM]   1613 Comprehensive metabolic panel(!)  No significant electrolyte abnormality or renal dysfunction. [MM]   1728 Influenza A, Molecular: Not Detected [MM]   1728 Influenza B, Molecular: Not Detected [MM]   1728 SARS-CoV2 (COVID-19) Qualitative PCR: Not Detected [MM]   2132 On re-evaluation patient is sleeping comfortably.  Her labs were largely unrevealing.  No convincing evidence of UTI likely more colonized from his Fernandez catheter.  He has no stigmata of infection no significant leukocytosis no fever.  Slightly elevated BUN and creatinine ratio but he has received fluids here in the emergency department.  Hemodynamically stable.  He is in no acute distress.  CT head was unrevealing.  He is on the ventilator with a his trach his oxygen is 100%.  Believe this time he is suitable for discharge back to the nursing home.  Per report he is at baseline. [MM]   2132 CT Head Without Contrast  No acute changes [MM]      ED Course User Index  [BS] Lashon Sterling MD  [MM]  Rob Delgado MD                           Clinical Impression:  Final diagnoses:  [Z98.890] History of tracheostomy (Primary)          ED Disposition Condition    Discharge Stable          ED Prescriptions    None       Follow-up Information       Follow up With Specialties Details Why Contact Info    [facility] - Emergency Dept Emergency Medicine Go to   [address] [place] [id number] [phone]    [facility]    [address] [place] [id number]  [phone]                   [1]   Social History  Tobacco Use    Smoking status: Never    Smokeless tobacco: Never   Substance Use Topics    Alcohol use: Not Currently    Drug use: Not Currently        Rob Delgado MD  05/03/25 0030

## 2025-05-03 ENCOUNTER — RESULTS FOLLOW-UP (OUTPATIENT)
Dept: EMERGENCY MEDICINE | Facility: HOSPITAL | Age: 70
End: 2025-05-03

## 2025-05-04 NOTE — ED NOTES
+ Blood culture resulted called from lab. Gram positive cocci in the anaerobic bottle probable staph. Dr. Drew notified. Will notify pt of need return to the ER for repeat blood cultures.

## 2025-05-05 ENCOUNTER — LAB REQUISITION (OUTPATIENT)
Dept: LAB | Facility: HOSPITAL | Age: 70
End: 2025-05-05
Payer: MEDICARE

## 2025-05-05 DIAGNOSIS — R50.9 FEVER, UNSPECIFIED: ICD-10-CM

## 2025-05-05 LAB — BACTERIA UR CULT: ABNORMAL

## 2025-05-05 PROCEDURE — 87040 BLOOD CULTURE FOR BACTERIA: CPT | Mod: 91 | Performed by: NURSE PRACTITIONER

## 2025-05-06 LAB
BACTERIA BLD CULT: ABNORMAL
BACTERIA BLD CULT: ABNORMAL
GRAM STN SPEC: ABNORMAL

## 2025-05-08 ENCOUNTER — LAB REQUISITION (OUTPATIENT)
Dept: LAB | Facility: HOSPITAL | Age: 70
End: 2025-05-08
Payer: MEDICARE

## 2025-05-08 DIAGNOSIS — E86.0 DEHYDRATION: ICD-10-CM

## 2025-05-08 LAB
ANION GAP SERPL CALC-SCNC: 9 MEQ/L
BUN SERPL-MCNC: 22.8 MG/DL (ref 8.4–25.7)
CALCIUM SERPL-MCNC: 8.5 MG/DL (ref 8.8–10)
CHLORIDE SERPL-SCNC: 109 MMOL/L (ref 98–107)
CO2 SERPL-SCNC: 25 MMOL/L (ref 23–31)
CREAT SERPL-MCNC: 0.7 MG/DL (ref 0.72–1.25)
CREAT/UREA NIT SERPL: 33
GFR SERPLBLD CREATININE-BSD FMLA CKD-EPI: >60 ML/MIN/1.73/M2
GLUCOSE SERPL-MCNC: 83 MG/DL (ref 82–115)
POTASSIUM SERPL-SCNC: 3.9 MMOL/L (ref 3.5–5.1)
PREALB SERPL-MCNC: 15.7 MG/DL (ref 16–42)
SODIUM SERPL-SCNC: 143 MMOL/L (ref 136–145)

## 2025-05-08 PROCEDURE — 80048 BASIC METABOLIC PNL TOTAL CA: CPT | Performed by: INTERNAL MEDICINE

## 2025-05-08 PROCEDURE — 84134 ASSAY OF PREALBUMIN: CPT | Performed by: INTERNAL MEDICINE

## 2025-05-11 LAB
BACTERIA BLD CULT: NORMAL
BACTERIA BLD CULT: NORMAL

## 2025-05-27 NOTE — ED PROVIDER NOTES
Encounter Date: 1/17/2024    SCRIBE #1 NOTE: I, Vernon Roland, am scribing for, and in the presence of,  Lashon Sterling MD. I have scribed the following portions of the note - Other sections scribed: HPI,ROS,PE.       History     Chief Complaint   Patient presents with    Seizures     EMS reports pt. From [facility], Craniotomy in December, Seizure-like activity today, with coffee ground emesis, Trach/Vent present. Cardene infusing at 10mg     67 y/o male with PMHx of Afib, CAD, HTN, HLD, MI, Obesity, CVA, tracheostomy, and craniectomy in December of 2023, presents to ED via EMS c/o possible seizure.  Pt was discharged yesterday. EMS reports pt came from [facility]. They report he had coffee ground emesis, had a 100.7 fever, and had some twitching activity. EMS reports pt is at baseline.     Per LT records he is over there for respiratory failure and bacteremia. At [facility], pt was given keppra and labs which were benign.     History and ROS limited due to pt being unresponsive.     The history is provided by the EMS personnel and medical records. History limited by: pt unresponsive. No  was used.     Review of patient's allergies indicates:  No Known Allergies  Past Medical History:   Diagnosis Date    Arthritis     Atrial fibrillation     BPH (benign prostatic hyperplasia)     Cardiac arrest     Coronary artery disease     Cyst, kidney, acquired     Diverticulosis     Hyperlipidemia     Hypertension     MI (myocardial infarction)     Obesity     Steatosis of liver     Stroke      Past Surgical History:   Procedure Laterality Date    A-V CARDIAC PACEMAKER INSERTION Right     CARDIAC CATHETERIZATION      COLONOSCOPY W/ BIOPSIES      CRANIECTOMY Right 12/20/2023    Procedure: CRANIECTOMY;  Surgeon: Artem Can MD;  Location: [facility];  Service: Neurosurgery;  Laterality: Right;    ESOPHAGOGASTRODUODENOSCOPY W/ PEG N/A 1/2/2024    Procedure: PEG;  Surgeon: Tani Day MD;  Location: [facility]  ENDOSCOPY;  Service: Gastroenterology;  Laterality: N/A;    excision of colon      TRACHEOSTOMY N/A 12/29/2023    Procedure: CREATION, TRACHEOSTOMY;  Surgeon: Patricia Winslow MD;  Location: [facility] OR;  Service: ENT;  Laterality: N/A;  REQ 1130 //  NEEDS 2 SCRUBS     Family History   Problem Relation Age of Onset    Hypertension Mother     Hypertension Father     Hypertension Sister      Social History     Tobacco Use    Smoking status: Never    Smokeless tobacco: Never   Substance Use Topics    Alcohol use: Not Currently    Drug use: Not Currently     Review of Systems   Unable to perform ROS: Patient unresponsive       Physical Exam     Initial Vitals   BP Pulse Resp Temp SpO2   01/17/24 1216 01/17/24 1216 01/17/24 1216 01/17/24 1219 01/17/24 1216   (!) 142/94 (!) 122 18 100.2 °F (37.9 °C) 97 %      MAP       --                Physical Exam    Nursing note and vitals reviewed.  Constitutional: No distress.   HENT:   Head: Normocephalic and atraumatic.   Trache in place. Crainectomy incision intact.    Eyes:   Clenching eyes, unable to perform eye exam.    Neck: Trachea normal. Neck supple.   Normal range of motion.  Cardiovascular:  Normal rate and regular rhythm.           No murmur heard.  Pulmonary/Chest: No respiratory distress. He exhibits no tenderness.   Coarse breath sounds    Abdominal: Abdomen is soft. Bowel sounds are normal. There is no abdominal tenderness.   PEG tube in place    Genitourinary:    Genitourinary Comments: Fernandez with dark urine in bag.      Musculoskeletal:         General: Normal range of motion.      Cervical back: Normal range of motion and neck supple.      Lumbar back: Normal. No tenderness. Normal range of motion.     Neurological: He is unresponsive. No cranial nerve deficit or sensory deficit.   Twitching on the left.    Skin: Skin is warm and dry.         ED Course   Critical Care    Date/Time: 1/17/2024 4:28 PM    Performed by: Lashon Sterling MD  Authorized by: Hallie  Lashon FERREIRA MD  Direct patient critical care time: 55 minutes  Total critical care time (exclusive of procedural time) : 55 minutes  Critical care was necessary to treat or prevent imminent or life-threatening deterioration of the following conditions: CNS failure or compromise, respiratory failure and metabolic crisis.  Critical care was time spent personally by me on the following activities: development of treatment plan with patient or surrogate, discussions with consultants, evaluation of patient's response to treatment, examination of patient, obtaining history from patient or surrogate, ordering and performing treatments and interventions, ordering and review of radiographic studies, ordering and review of laboratory studies, pulse oximetry, re-evaluation of patient's condition and review of old charts.        Labs Reviewed   COMPREHENSIVE METABOLIC PANEL - Abnormal; Notable for the following components:       Result Value    Potassium Level 3.4 (*)     Glucose Level 126 (*)     Calcium Level Total 8.0 (*)     Albumin Level 2.6 (*)     Globulin 3.8 (*)     Albumin/Globulin Ratio 0.7 (*)     All other components within normal limits   URINALYSIS, REFLEX TO URINE CULTURE - Abnormal; Notable for the following components:    Appearance, UA Turbid (*)     Protein, UA 1+ (*)     Blood, UA 2+ (*)     Bacteria, UA Occasional (*)     Mucous, UA Trace (*)     Amorphous Crystal, UA Occasional (*)     RBC, UA 50-99 (*)     All other components within normal limits   CBC WITH DIFFERENTIAL - Abnormal; Notable for the following components:    RBC 3.51 (*)     Hgb 10.0 (*)     Hct 32.0 (*)     MCHC 31.3 (*)     IG# 0.12 (*)     All other components within normal limits   APTT - Normal   LACTIC ACID, PLASMA - Normal   PROTIME-INR - Normal   COVID/RSV/FLU A&B PCR - Normal    Narrative:     The Xpert Xpress SARS-CoV-2/FLU/RSV plus is a rapid, multiplexed real-time PCR test intended for the simultaneous qualitative detection and  differentiation of SARS-CoV-2, Influenza A, Influenza B, and respiratory syncytial virus (RSV) viral RNA in either nasopharyngeal swab or nasal swab specimens.         BLOOD CULTURE [facility]   BLOOD CULTURE [facility]   CBC W/ AUTO DIFFERENTIAL    Narrative:     The following orders were created for panel order CBC auto differential.  Procedure                               Abnormality         Status                     ---------                               -----------         ------                     CBC with Differential[[id number]]       Abnormal            Final result                 Please view results for these tests on the individual orders.          Imaging Results              CT Abdomen Pelvis With IV Contrast NO Oral Contrast (Final result)  Result time 01/17/24 16:06:36      Final result by Nellie Grant MD (01/17/24 16:06:36)                   Impression:      Mild inflammatory changes in the right lower quadrant in the mesentery with some punctate lymph nodes seen adjacent to it.  Findings could represent mesenteric adenitis.    Some inflammatory changes around the urinary bladder.  Cystitis should be excluded.    Left lower lobe and right lower lobe atelectasis with a small left-sided pleural effusion      Electronically signed by: Erick Grant  Date:    01/17/2024  Time:    16:06               Narrative:    EXAMINATION:  CT ABDOMEN PELVIS WITH IV CONTRAST    CLINICAL HISTORY:  Nausea/vomiting;    TECHNIQUE:  Low dose axial images, sagittal and coronal reformations were obtained from the lung bases to the pubic symphysis following the IV administration of contrast. Automatic exposure control (AEC) is utilized to reduce patient radiation exposure.    COMPARISON:  None.    FINDINGS:  There is bibasilar atelectasis.  There is a small left-sided pleural effusion.    There is a extra ostomy tube in the stomach..    The liver appears normal.  No liver mass or lesion is seen.  Portal and hepatic veins  appear normal.    The gallbladder appears normal.  No obvious gallstones are seen.  No biliary dilatation is seen.  No pericholecystic fluid is seen.    The pancreas appears normal.  No pancreatic mass or lesion is seen.    The spleen shows no acute abnormality.    The adrenal glands appear normal.  No adrenal nodule is seen.    The kidneys appear normal.  There is a cyst in the lower pole the right kidney.  No hydronephrosis is seen.  No hydroureter is seen.  No nephrolithiasis is seen.  No obvious ureteral stones are seen.    The urinary bladder is decompressed due to Fernandez catheter.  Some inflammatory changes are seen around the urinary bladder.  Cystitis should be excluded..    There is some inflammatory changes in the right lower quadrant adjacent to the cecum with some punctate lymph nodes seen in the region.  Findings could represent a mild mesenteric adenitis.  No colitis is seen.  No diverticulitis is seen.  No obvious colonic mass or lesion is seen.  The appendix appears normal.    No free air is seen.  No free fluid is seen.                                       CT Head Without Contrast (Final result)  Result time 01/17/24 15:59:21      Final result by Nellie Grant MD (01/17/24 15:59:21)                   Impression:      Interval worsening of the cerebral edema in the right cerebral hemisphere    Hydrocephalus slightly worse    Intraparenchymal areas of hemorrhage that was seen previously has shown some significant improvement.      Electronically signed by: Erick Grant  Date:    01/17/2024  Time:    15:59               Narrative:    EXAMINATION:  CT HEAD WITHOUT CONTRAST    CLINICAL HISTORY:  Seizure, new-onset, no history of trauma;    TECHNIQUE:  Multiple axial images were obtained from the base of the brain to the vertex without contrast administration.  Sagittal and coronal reconstructions were performed. .Automatic exposure control  (AEC) is utilized to reduce patient radiation  exposure.    COMPARISON:  01/02/2024    FINDINGS:  The patient is status post previous craniotomy in the right temporal and parietal region.  There is a significant amount of edema seen in the right cerebral hemisphere.  The edema appears worse than the prior examination.  The areas of hemorrhage that was seen previously in the right cerebral hemisphere shown some significant improvement.  There is evidence of hydrocephalus which is slightly worse than the prior examination.  No parenchymal abnormality seen in the left cerebral hemisphere.  No focal mass is seen.    Posterior fossa appears grossly unremarkable.                                       X-Ray Chest AP Portable (Final result)  Result time 01/17/24 13:14:25      Final result by Vik Keen MD (01/17/24 13:14:25)                   Impression:      No significant change      Electronically signed by: Vik Keen  Date:    01/17/2024  Time:    13:14               Narrative:    EXAMINATION:  XR CHEST AP PORTABLE    CLINICAL HISTORY:  fever;    TECHNIQUE:  Single frontal view of the chest was performed.    COMPARISON:  January 17, 2024    FINDINGS:  Cardiomediastinal silhouette and pleuroparenchymal changes are essentially unchanged as compared with the previous exam                                       X-Ray Abdomen AP 1 View (KUB) (Final result)  Result time 01/17/24 13:13:49      Final result by Vik Keen MD (01/17/24 13:13:49)                   Impression:      Nonspecific gas pattern.    Gastrostomy cannula as above      Electronically signed by: Vik Keen  Date:    01/17/2024  Time:    13:13               Narrative:    EXAMINATION:  XR ABDOMEN AP 1 VIEW    CLINICAL HISTORY:  Vomiting, unspecified    TECHNIQUE:  AP X-RAY OF THE ABDOMEN:    CPT 88488    FINDINGS:  Seven sub some residual feces throughout the colon the gas pattern is nonspecific with no clear evidence of ileus or obstruction no abnormal masses or  calcifications identified gastrostomy cannula projects over the left upper quadrant                                    X-Rays:   Independently Interpreted Readings:   Chest X-Ray: Normal heart size.  No infiltrates.  No acute abnormalities.     Medications   pantoprazole (PROTONIX) 40 mg in sodium chloride 0.9 % 100 mL IVPB (MB+) (8 mg/hr Intravenous New Bag 1/17/24 1419)   diltiaZEM 125 mg in dextrose 5 % 125 mL IVPB (ready to mix) (non-titrating) (5 mg/hr Intravenous New Bag 1/17/24 1516)   levETIRAcetam in NaCl (iso-os) IVPB 1,500 mg (1,500 mg Intravenous New Bag 1/17/24 1626)   atorvastatin tablet 10 mg (has no administration in time range)   carvediloL tablet 3.125 mg (has no administration in time range)   diltiaZEM tablet 240 mg (has no administration in time range)   losartan tablet 50 mg (has no administration in time range)   polyethylene glycol packet 17 g (has no administration in time range)   QUEtiapine tablet 25 mg (has no administration in time range)   sodium chloride 0.9% flush 10 mL (has no administration in time range)   mupirocin 2 % ointment (has no administration in time range)   hydrALAZINE injection 10 mg (has no administration in time range)   labetaloL injection 10 mg (10 mg Intravenous Given 1/17/24 1824)   levoFLOXacin 750 mg/150 mL IVPB 750 mg (750 mg Intravenous New Bag 1/17/24 1834)   lactated ringers infusion ( Intravenous New Bag 1/17/24 1833)   vancomycin - pharmacy to dose (has no administration in time range)   metoprolol injection 5 mg (has no administration in time range)   acetaminophen 1,000 mg/100 mL (10 mg/mL) injection 1,000 mg (0 mg Intravenous Stopped 1/17/24 1258)   sodium chloride 0.9% bolus 1,000 mL 1,000 mL (0 mLs Intravenous Stopped 1/17/24 1340)   LORazepam injection 2 mg (2 mg Intravenous Given 1/17/24 1240)   diltiaZEM injection 20 mg (20 mg Intravenous Given 1/17/24 1254)   pantoprazole injection 80 mg (80 mg Intravenous Given 1/17/24 1416)   iopamidoL  (ISOVUE-370) injection 100 mL (100 mLs Intravenous Given 1/17/24 1552)   fentaNYL injection 25 mcg (25 mcg Intravenous Given 1/17/24 6933)             Scribe Attestation:   Scribe #1: I performed the above scribed service and the documentation accurately describes the services I performed. I attest to the accuracy of the note.  Comments: Attending:   Physician Attestation Statement for Scribe #1: Lashon MORGAN MD, personally performed the services described in this documentation. All medical record entries made by the scribe were at my direction and in my presence.  I have reviewed the chart and agree that the record reflects my personal performance and is accurate and complete.        Attending Attestation:           Physician Attestation for Scribe:  Physician Attestation Statement for Scribe #1: Hallie MORGAN Brooke R, MD, reviewed documentation, as scribed by Vernon Roland in my presence, and it is both accurate and complete.         Medical Decision Making  The differential diagnosis includes, but is not limited to, PNA, UTI, status epilepticus, and ICH. Sbo, upper gi bleed  Cbc, cmp, lactic, coags, cultures, ct head, ct abd pelvis, ordered and reviewd  Given ativan without further twitching  Cardizem bolus and infusion  Gi bleed noted, started protonix, h./h stable  Discussed with neurology and gi per ed course  Admitted to icu as he is chronically vented  No acute evidence of infection, lactic acid normal labs overall unremarkable  Certainly high risk for seizure disorder given large mca with craniectomy    Problems Addressed:  Atrial fibrillation with RVR: chronic illness or injury with exacerbation, progression, or side effects of treatment  Chronic anemia: chronic illness or injury  Chronic respiratory failure with hypoxia: chronic illness or injury  Seizure: acute illness or injury that poses a threat to life or bodily functions  Upper GI bleed: acute illness or injury that poses a threat to life or  bodily functions  Vomiting: acute illness or injury that poses a threat to life or bodily functions    Amount and/or Complexity of Data Reviewed  Independent Historian: EMS     Details:  Pt was discharged yesterday. EMS reports pt came from [facility]. They report he projectile vomited, has a 100.7 fever, and had some twitching activity. EMS reports pt is at baseline.   External Data Reviewed: labs and notes.     Details: Per LT records he is over there for respiratory failure and bacteremia. At [facility], pt was given keppra and labs which were benign.   Labs: ordered. Decision-making details documented in ED Course.  Radiology: ordered. Decision-making details documented in ED Course.  ECG/medicine tests: ordered and independent interpretation performed.    Risk  OTC drugs.  Prescription drug management.  Drug therapy requiring intensive monitoring for toxicity.  Decision regarding hospitalization.    Critical Care  Total time providing critical care: 55 minutes         [unfilled]   ED Course as of 01/17/24 1919 Wed Jan 17, 2024   1222 He had blood cultures positive for staph epidermidis T jaime was negative for vegetation patient to complete 14 days of vanc and 7 days of Levaquin [BS]   1225 He was given Keppra at the rehab facility [BS]   1343 No longer with localized twitching [BS]   1405 Gastroccult + [BS]   1451 Awaiting cts [BS]   1608 Spoke with gi and neurology   [BS]   1610 Received levaquin today [BS]   1624 Paged ICU  [NIRAV]      ED Course User Index  [BS] Lashon Sterling MD  [NIRAV] Vernon Roland                 Medical Decision Making:   History:   Old Medical Records: I decided to obtain old medical records.  Old Records Summarized: records from previous admission(s).  Initial Assessment:   See hpi  Independently Interpreted Test(s):   I have ordered and independently interpreted X-rays - see prior notes.  I have ordered and independently interpreted EKG Reading(s) - see prior notes  Clinical Tests:   Lab  Tests: Ordered and Reviewed  Radiological Study: Ordered and Reviewed  Medical Tests: Ordered and Reviewed  Other:   I have discussed this case with another health care provider.             Clinical Impression:  Final diagnoses:  [R11.10] Vomiting  [I48.91] Atrial fibrillation with RVR (Primary)  [K92.2] Upper GI bleed  [D64.9] Chronic anemia  [J96.11] Chronic respiratory failure with hypoxia  [R56.9] Seizure          ED Disposition Condition    Admit Stable                Lashon Sterling MD  01/17/24 1920     [Consultation] : a consultation visit

## 2025-06-09 ENCOUNTER — LAB REQUISITION (OUTPATIENT)
Dept: LAB | Facility: HOSPITAL | Age: 70
End: 2025-06-09
Payer: MEDICARE

## 2025-06-09 DIAGNOSIS — E86.0 DEHYDRATION: ICD-10-CM

## 2025-06-09 LAB
ANION GAP SERPL CALC-SCNC: 11 MEQ/L
BUN SERPL-MCNC: 22.1 MG/DL (ref 8.4–25.7)
CALCIUM SERPL-MCNC: 8.3 MG/DL (ref 8.8–10)
CHLORIDE SERPL-SCNC: 111 MMOL/L (ref 98–107)
CO2 SERPL-SCNC: 22 MMOL/L (ref 23–31)
CREAT SERPL-MCNC: 0.66 MG/DL (ref 0.72–1.25)
CREAT/UREA NIT SERPL: 33
GFR SERPLBLD CREATININE-BSD FMLA CKD-EPI: >60 ML/MIN/1.73/M2
GLUCOSE SERPL-MCNC: 70 MG/DL (ref 82–115)
POTASSIUM SERPL-SCNC: 3.2 MMOL/L (ref 3.5–5.1)
PREALB SERPL-MCNC: 17.8 MG/DL (ref 16–42)
SODIUM SERPL-SCNC: 144 MMOL/L (ref 136–145)

## 2025-06-09 PROCEDURE — 84134 ASSAY OF PREALBUMIN: CPT | Performed by: INTERNAL MEDICINE

## 2025-06-09 PROCEDURE — 80048 BASIC METABOLIC PNL TOTAL CA: CPT | Performed by: INTERNAL MEDICINE

## 2025-06-20 ENCOUNTER — HOSPITAL ENCOUNTER (INPATIENT)
Facility: HOSPITAL | Age: 70
LOS: 1 days | Discharge: HOME | DRG: 682 | End: 2025-06-21
Attending: STUDENT IN AN ORGANIZED HEALTH CARE EDUCATION/TRAINING PROGRAM | Admitting: INTERNAL MEDICINE
Payer: MEDICARE

## 2025-06-20 DIAGNOSIS — R06.02 SOB (SHORTNESS OF BREATH): ICD-10-CM

## 2025-06-20 DIAGNOSIS — N17.9 AKI (ACUTE KIDNEY INJURY): ICD-10-CM

## 2025-06-20 DIAGNOSIS — R00.0 TACHYCARDIA: ICD-10-CM

## 2025-06-20 LAB
BASOPHILS # BLD AUTO: 0.05 X10(3)/MCL
BASOPHILS NFR BLD AUTO: 0.7 %
EOSINOPHIL # BLD AUTO: 0.11 X10(3)/MCL (ref 0–0.9)
EOSINOPHIL NFR BLD AUTO: 1.6 %
ERYTHROCYTE [DISTWIDTH] IN BLOOD BY AUTOMATED COUNT: 17.9 % (ref 11.5–17)
HCT VFR BLD AUTO: 41.6 % (ref 42–52)
HGB BLD-MCNC: 12.3 G/DL (ref 14–18)
IMM GRANULOCYTES # BLD AUTO: 0.04 X10(3)/MCL (ref 0–0.04)
IMM GRANULOCYTES NFR BLD AUTO: 0.6 %
LYMPHOCYTES # BLD AUTO: 1.49 X10(3)/MCL (ref 0.6–4.6)
LYMPHOCYTES NFR BLD AUTO: 22.2 %
MCH RBC QN AUTO: 26.9 PG (ref 27–31)
MCHC RBC AUTO-ENTMCNC: 29.6 G/DL (ref 33–36)
MCV RBC AUTO: 90.8 FL (ref 80–94)
MONOCYTES # BLD AUTO: 0.71 X10(3)/MCL (ref 0.1–1.3)
MONOCYTES NFR BLD AUTO: 10.6 %
NEUTROPHILS # BLD AUTO: 4.31 X10(3)/MCL (ref 2.1–9.2)
NEUTROPHILS NFR BLD AUTO: 64.3 %
NRBC BLD AUTO-RTO: 0 %
PLATELET # BLD AUTO: 203 X10(3)/MCL (ref 130–400)
PMV BLD AUTO: 11.1 FL (ref 7.4–10.4)
RBC # BLD AUTO: 4.58 X10(6)/MCL (ref 4.7–6.1)
WBC # BLD AUTO: 6.71 X10(3)/MCL (ref 4.5–11.5)

## 2025-06-20 PROCEDURE — 85025 COMPLETE CBC W/AUTO DIFF WBC: CPT | Performed by: STUDENT IN AN ORGANIZED HEALTH CARE EDUCATION/TRAINING PROGRAM

## 2025-06-20 PROCEDURE — 84484 ASSAY OF TROPONIN QUANT: CPT | Performed by: STUDENT IN AN ORGANIZED HEALTH CARE EDUCATION/TRAINING PROGRAM

## 2025-06-20 PROCEDURE — 83735 ASSAY OF MAGNESIUM: CPT | Performed by: STUDENT IN AN ORGANIZED HEALTH CARE EDUCATION/TRAINING PROGRAM

## 2025-06-20 PROCEDURE — 94760 N-INVAS EAR/PLS OXIMETRY 1: CPT

## 2025-06-20 PROCEDURE — 5A1935Z RESPIRATORY VENTILATION, LESS THAN 24 CONSECUTIVE HOURS: ICD-10-PCS | Performed by: INTERNAL MEDICINE

## 2025-06-20 PROCEDURE — 80053 COMPREHEN METABOLIC PANEL: CPT | Performed by: STUDENT IN AN ORGANIZED HEALTH CARE EDUCATION/TRAINING PROGRAM

## 2025-06-20 PROCEDURE — 27100171 HC OXYGEN HIGH FLOW UP TO 24 HOURS

## 2025-06-20 PROCEDURE — 99900035 HC TECH TIME PER 15 MIN (STAT)

## 2025-06-20 PROCEDURE — 83880 ASSAY OF NATRIURETIC PEPTIDE: CPT | Performed by: STUDENT IN AN ORGANIZED HEALTH CARE EDUCATION/TRAINING PROGRAM

## 2025-06-20 PROCEDURE — 94002 VENT MGMT INPAT INIT DAY: CPT

## 2025-06-20 PROCEDURE — 99900026 HC AIRWAY MAINTENANCE (STAT)

## 2025-06-21 VITALS
SYSTOLIC BLOOD PRESSURE: 133 MMHG | RESPIRATION RATE: 27 BRPM | TEMPERATURE: 99 F | OXYGEN SATURATION: 100 % | WEIGHT: 155 LBS | HEIGHT: 67 IN | DIASTOLIC BLOOD PRESSURE: 85 MMHG | HEART RATE: 102 BPM | BODY MASS INDEX: 24.33 KG/M2

## 2025-06-21 PROBLEM — I48.91 ATRIAL FIBRILLATION WITH RAPID VENTRICULAR RESPONSE: Status: ACTIVE | Noted: 2025-06-21

## 2025-06-21 PROBLEM — N17.9 AKI (ACUTE KIDNEY INJURY): Status: ACTIVE | Noted: 2025-06-21

## 2025-06-21 LAB
ALBUMIN SERPL-MCNC: 2.4 G/DL (ref 3.4–4.8)
ALBUMIN/GLOB SERPL: 0.4 RATIO (ref 1.1–2)
ALP SERPL-CCNC: 93 UNIT/L (ref 40–150)
ALT SERPL-CCNC: 21 UNIT/L (ref 0–55)
AMORPH URATE CRY URNS QL MICRO: ABNORMAL /UL
ANION GAP SERPL CALC-SCNC: 10 MEQ/L
ANION GAP SERPL CALC-SCNC: 12 MEQ/L
APTT PPP: 34.2 SECONDS (ref 23.2–33.7)
AST SERPL-CCNC: 41 UNIT/L (ref 11–45)
BACTERIA #/AREA URNS AUTO: ABNORMAL /HPF
BILIRUB SERPL-MCNC: 0.6 MG/DL
BILIRUB UR QL STRIP.AUTO: NEGATIVE
BNP BLD-MCNC: 284 PG/ML
BUN SERPL-MCNC: 46.2 MG/DL (ref 8.4–25.7)
BUN SERPL-MCNC: 63.6 MG/DL (ref 8.4–25.7)
CALCIUM SERPL-MCNC: 8.5 MG/DL (ref 8.8–10)
CALCIUM SERPL-MCNC: 8.8 MG/DL (ref 8.8–10)
CHLORIDE SERPL-SCNC: 110 MMOL/L (ref 98–107)
CHLORIDE SERPL-SCNC: 113 MMOL/L (ref 98–107)
CLARITY UR: ABNORMAL
CO2 SERPL-SCNC: 21 MMOL/L (ref 23–31)
CO2 SERPL-SCNC: 23 MMOL/L (ref 23–31)
COLOR UR AUTO: ABNORMAL
COLOR UR AUTO: ABNORMAL
COLOR UR AUTO: YELLOW
CREAT SERPL-MCNC: 1.11 MG/DL (ref 0.72–1.25)
CREAT SERPL-MCNC: 1.68 MG/DL (ref 0.72–1.25)
CREAT/UREA NIT SERPL: 38
CREAT/UREA NIT SERPL: 42
GFR SERPLBLD CREATININE-BSD FMLA CKD-EPI: 44 ML/MIN/1.73/M2
GFR SERPLBLD CREATININE-BSD FMLA CKD-EPI: >60 ML/MIN/1.73/M2
GLOBULIN SER-MCNC: 5.4 GM/DL (ref 2.4–3.5)
GLUCOSE SERPL-MCNC: 86 MG/DL (ref 82–115)
GLUCOSE SERPL-MCNC: 99 MG/DL (ref 82–115)
GLUCOSE UR QL STRIP: NEGATIVE
GLUCOSE UR QL STRIP: NORMAL
GLUCOSE UR QL STRIP: NORMAL
HGB UR QL STRIP: ABNORMAL
INR PPP: 1.4
KETONES UR QL STRIP: NEGATIVE
LACTATE SERPL-SCNC: 1 MMOL/L (ref 0.5–2.2)
LEUKOCYTE ESTERASE UR QL STRIP: 500
LEUKOCYTE ESTERASE UR QL STRIP: 500
LEUKOCYTE ESTERASE UR QL STRIP: ABNORMAL
MAGNESIUM SERPL-MCNC: 2.4 MG/DL (ref 1.6–2.6)
MUCOUS THREADS URNS QL MICRO: ABNORMAL /LPF
MUCOUS THREADS URNS QL MICRO: ABNORMAL /LPF
NITRITE UR QL STRIP: NEGATIVE
NITRITE UR QL STRIP: NEGATIVE
NITRITE UR QL STRIP: POSITIVE
OHS QRS DURATION: 80 MS
OHS QTC CALCULATION: 505 MS
PH UR STRIP: 7 [PH]
POTASSIUM SERPL-SCNC: 4 MMOL/L (ref 3.5–5.1)
POTASSIUM SERPL-SCNC: 5.1 MMOL/L (ref 3.5–5.1)
PROT SERPL-MCNC: 7.8 GM/DL (ref 5.8–7.6)
PROT UR QL STRIP: ABNORMAL
PROTHROMBIN TIME: 17 SECONDS (ref 12.5–14.5)
RBC #/AREA URNS AUTO: ABNORMAL /HPF
SODIUM SERPL-SCNC: 143 MMOL/L (ref 136–145)
SODIUM SERPL-SCNC: 146 MMOL/L (ref 136–145)
SP GR UR STRIP.AUTO: 1.01 (ref 1–1.03)
SQUAMOUS #/AREA URNS LPF: ABNORMAL /HPF
TROPONIN I SERPL-MCNC: <0.01 NG/ML (ref 0–0.04)
UROBILINOGEN UR STRIP-ACNC: 0.2
UROBILINOGEN UR STRIP-ACNC: NORMAL
UROBILINOGEN UR STRIP-ACNC: NORMAL
WBC #/AREA URNS AUTO: >100 /HPF
WBC #/AREA URNS AUTO: >100 /HPF
WBC #/AREA URNS AUTO: ABNORMAL /HPF
WBC CLUMPS UR QL AUTO: ABNORMAL
WBC CLUMPS UR QL AUTO: ABNORMAL

## 2025-06-21 PROCEDURE — 94003 VENT MGMT INPAT SUBQ DAY: CPT

## 2025-06-21 PROCEDURE — 87040 BLOOD CULTURE FOR BACTERIA: CPT | Performed by: STUDENT IN AN ORGANIZED HEALTH CARE EDUCATION/TRAINING PROGRAM

## 2025-06-21 PROCEDURE — 25000003 PHARM REV CODE 250

## 2025-06-21 PROCEDURE — 87086 URINE CULTURE/COLONY COUNT: CPT | Performed by: STUDENT IN AN ORGANIZED HEALTH CARE EDUCATION/TRAINING PROGRAM

## 2025-06-21 PROCEDURE — 99900035 HC TECH TIME PER 15 MIN (STAT)

## 2025-06-21 PROCEDURE — 99900022

## 2025-06-21 PROCEDURE — 25000003 PHARM REV CODE 250: Performed by: STUDENT IN AN ORGANIZED HEALTH CARE EDUCATION/TRAINING PROGRAM

## 2025-06-21 PROCEDURE — 85610 PROTHROMBIN TIME: CPT

## 2025-06-21 PROCEDURE — 94761 N-INVAS EAR/PLS OXIMETRY MLT: CPT

## 2025-06-21 PROCEDURE — 20000000 HC ICU ROOM

## 2025-06-21 PROCEDURE — 51702 INSERT TEMP BLADDER CATH: CPT

## 2025-06-21 PROCEDURE — 80048 BASIC METABOLIC PNL TOTAL CA: CPT

## 2025-06-21 PROCEDURE — 93010 ELECTROCARDIOGRAM REPORT: CPT | Mod: ,,, | Performed by: INTERNAL MEDICINE

## 2025-06-21 PROCEDURE — 27100171 HC OXYGEN HIGH FLOW UP TO 24 HOURS

## 2025-06-21 PROCEDURE — 27200966 HC CLOSED SUCTION SYSTEM

## 2025-06-21 PROCEDURE — 81001 URINALYSIS AUTO W/SCOPE: CPT | Performed by: INTERNAL MEDICINE

## 2025-06-21 PROCEDURE — 81001 URINALYSIS AUTO W/SCOPE: CPT | Performed by: STUDENT IN AN ORGANIZED HEALTH CARE EDUCATION/TRAINING PROGRAM

## 2025-06-21 PROCEDURE — 93005 ELECTROCARDIOGRAM TRACING: CPT

## 2025-06-21 PROCEDURE — 99900026 HC AIRWAY MAINTENANCE (STAT)

## 2025-06-21 PROCEDURE — 96360 HYDRATION IV INFUSION INIT: CPT

## 2025-06-21 PROCEDURE — 99900031 HC PATIENT EDUCATION (STAT)

## 2025-06-21 PROCEDURE — 85730 THROMBOPLASTIN TIME PARTIAL: CPT

## 2025-06-21 PROCEDURE — 94760 N-INVAS EAR/PLS OXIMETRY 1: CPT

## 2025-06-21 PROCEDURE — 36415 COLL VENOUS BLD VENIPUNCTURE: CPT

## 2025-06-21 PROCEDURE — 96361 HYDRATE IV INFUSION ADD-ON: CPT

## 2025-06-21 PROCEDURE — 99285 EMERGENCY DEPT VISIT HI MDM: CPT | Mod: 25

## 2025-06-21 PROCEDURE — 83605 ASSAY OF LACTIC ACID: CPT | Performed by: STUDENT IN AN ORGANIZED HEALTH CARE EDUCATION/TRAINING PROGRAM

## 2025-06-21 RX ORDER — BACLOFEN 10 MG/1
10 TABLET ORAL 3 TIMES DAILY
COMMUNITY

## 2025-06-21 RX ORDER — ONDANSETRON HYDROCHLORIDE 2 MG/ML
4 INJECTION, SOLUTION INTRAVENOUS EVERY 6 HOURS PRN
Status: DISCONTINUED | OUTPATIENT
Start: 2025-06-21 | End: 2025-06-21 | Stop reason: HOSPADM

## 2025-06-21 RX ORDER — METOCLOPRAMIDE HYDROCHLORIDE 5 MG/5ML
10 SOLUTION ORAL 3 TIMES DAILY
COMMUNITY

## 2025-06-21 RX ORDER — LANOLIN ALCOHOL/MO/W.PET/CERES
1 CREAM (GRAM) TOPICAL
COMMUNITY

## 2025-06-21 RX ORDER — POTASSIUM CHLORIDE ORAL 1.5 G/15ML
15 SOLUTION ORAL 3 TIMES DAILY
COMMUNITY

## 2025-06-21 RX ORDER — SODIUM CHLORIDE 0.9 % (FLUSH) 0.9 %
10 SYRINGE (ML) INJECTION
Status: DISCONTINUED | OUTPATIENT
Start: 2025-06-21 | End: 2025-06-21 | Stop reason: HOSPADM

## 2025-06-21 RX ORDER — MICONAZOLE NITRATE 2 G/100G
POWDER TOPICAL DAILY PRN
Status: DISCONTINUED | OUTPATIENT
Start: 2025-06-21 | End: 2025-06-21 | Stop reason: HOSPADM

## 2025-06-21 RX ORDER — METOPROLOL TARTRATE 1 MG/ML
5 INJECTION, SOLUTION INTRAVENOUS EVERY 5 MIN PRN
Status: DISCONTINUED | OUTPATIENT
Start: 2025-06-21 | End: 2025-06-21 | Stop reason: HOSPADM

## 2025-06-21 RX ORDER — MUPIROCIN 20 MG/G
OINTMENT TOPICAL 2 TIMES DAILY
Status: DISCONTINUED | OUTPATIENT
Start: 2025-06-22 | End: 2025-06-21 | Stop reason: HOSPADM

## 2025-06-21 RX ORDER — ACETAMINOPHEN 325 MG/1
650 TABLET ORAL EVERY 6 HOURS PRN
Status: DISCONTINUED | OUTPATIENT
Start: 2025-06-21 | End: 2025-06-21 | Stop reason: HOSPADM

## 2025-06-21 RX ORDER — ONDANSETRON 4 MG/1
8 TABLET, FILM COATED ORAL EVERY 8 HOURS PRN
COMMUNITY

## 2025-06-21 RX ORDER — PROCHLORPERAZINE EDISYLATE 5 MG/ML
5 INJECTION INTRAMUSCULAR; INTRAVENOUS EVERY 6 HOURS PRN
Status: DISCONTINUED | OUTPATIENT
Start: 2025-06-21 | End: 2025-06-21 | Stop reason: HOSPADM

## 2025-06-21 RX ADMIN — APIXABAN 5 MG: 5 TABLET, FILM COATED ORAL at 10:06

## 2025-06-21 RX ADMIN — SODIUM CHLORIDE 1000 ML: 9 INJECTION, SOLUTION INTRAVENOUS at 03:06

## 2025-06-21 RX ADMIN — SODIUM CHLORIDE 1000 ML: 9 INJECTION, SOLUTION INTRAVENOUS at 02:06

## 2025-06-21 RX ADMIN — SODIUM BICARBONATE: 84 INJECTION, SOLUTION INTRAVENOUS at 07:06

## 2025-06-21 RX ADMIN — APIXABAN 5 MG: 5 TABLET, FILM COATED ORAL at 08:06

## 2025-06-21 NOTE — ED PROVIDER NOTES
"Encounter Date: 6/20/2025    SCRIBE #1 NOTE: I, Kavitha Fitzpatrick, am scribing for, and in the presence of,  Robin Diego IV, MD. I have scribed the following portions of the note - Other sections scribed: HPI, ROS, PE.       History     Chief Complaint   Patient presents with    Chronic Condition     Arrives aasi unit 1203 from [facility] trach pt seen by family today and reported that pt does not look well - lab work done at NH     68 yo male who is ventilator dependent with a tracheostomy and a PMHx of Afib, CAD, HLD, HTN, MI, and CVA presents to ED via EMS from [facility]. EMS reports that nursing home staff stated that family visit the patient and told the staff they wanted him sent to the hospital because the pt "didn't look good". EMS reports that family left the nursing home and called 911 multiple times about the pt. EMS states that nursing home staff reports that the patient is at his baseline and has not had any concerning symptoms. EMS reports pts vitals were stable throughout the entire transport.    Pt is nonverbal, and has no complaints at this time.    The history is provided by the EMS personnel. The history is limited by the condition of the patient. No  was used.     Review of patient's allergies indicates:  No Known Allergies  Past Medical History:   Diagnosis Date    Arthritis     Atrial fibrillation     BPH (benign prostatic hyperplasia)     Cardiac arrest     Coronary artery disease     Cyst, kidney, acquired     Diverticulosis     Hyperlipidemia     Hypertension     MI (myocardial infarction)     Obesity     Steatosis of liver     Stroke      Past Surgical History:   Procedure Laterality Date    A-V CARDIAC PACEMAKER INSERTION Right     CARDIAC CATHETERIZATION      COLONOSCOPY W/ BIOPSIES      CRANIECTOMY Right 12/20/2023    Procedure: CRANIECTOMY;  Surgeon: Artem Can MD;  Location: [facility];  Service: Neurosurgery;  Laterality: " Right;    ESOPHAGOGASTRODUODENOSCOPY W/ PEG N/A 1/2/2024    Procedure: PEG;  Surgeon: Tani Day MD;  Location: [facility] ENDOSCOPY;  Service: Gastroenterology;  Laterality: N/A;    ESOPHAGOGASTRODUODENOSCOPY W/ PEG N/A 10/30/2024    Procedure: EGD w/ Jtube placement;  Surgeon: Gabriele Salcido MD;  Location: [facility] ENDOSCOPY;  Service: Endoscopy;  Laterality: N/A;  with J tube extension    excision of colon      TRACHEOSTOMY N/A 12/29/2023    Procedure: CREATION, TRACHEOSTOMY;  Surgeon: Patricia Winslow MD;  Location: [facility] OR;  Service: ENT;  Laterality: N/A;  REQ 1130 //  NEEDS 2 SCRUBS     Family History   Problem Relation Name Age of Onset    Hypertension Mother      Hypertension Father      Hypertension Sister       Social History[1]  Review of Systems   Unable to perform ROS: Patient nonverbal       Physical Exam     Initial Vitals [06/20/25 2302]   BP Pulse Resp Temp SpO2   (!) 130/90 103 20 98.5 °F (36.9 °C) 100 %      MAP       --         Physical Exam    Nursing note and vitals reviewed.  Constitutional: He is not diaphoretic. No distress.   HENT:   Head: Normocephalic and atraumatic.   Pt wearing medical helmet. Tracheostomy present and clear, try, and intact.    Neck: Neck supple.   Normal range of motion.  Cardiovascular:  Normal rate and regular rhythm.           Pulmonary/Chest: Breath sounds normal. No respiratory distress.   Ventilator dependent.   Abdominal: Abdomen is soft. He exhibits no distension. There is no abdominal tenderness.   Musculoskeletal:         General: No edema.      Cervical back: Normal range of motion and neck supple.      Comments: Bilateral arms contracted.     Neurological: He is alert and oriented to person, place, and time. He has normal strength. No cranial nerve deficit or sensory deficit.   Skin: Skin is warm. Capillary refill takes less than 2 seconds.   Psychiatric: He has a normal mood and affect.         ED Course   Procedures  Labs Reviewed    COMPREHENSIVE METABOLIC PANEL - Abnormal       Result Value    Sodium 143      Potassium 5.1      Chloride 110 (*)     CO2 21 (*)     Glucose 86      Blood Urea Nitrogen 63.6 (*)     Creatinine 1.68 (*)     Calcium 8.5 (*)     Protein Total 7.8 (*)     Albumin 2.4 (*)     Globulin 5.4 (*)     Albumin/Globulin Ratio 0.4 (*)     Bilirubin Total 0.6      ALP 93      ALT 21      AST 41      eGFR 44      Anion Gap 12.0      BUN/Creatinine Ratio 38     B-TYPE NATRIURETIC PEPTIDE - Abnormal    Natriuretic Peptide 284.0 (*)    CBC WITH DIFFERENTIAL - Abnormal    WBC 6.71      RBC 4.58 (*)     Hgb 12.3 (*)     Hct 41.6 (*)     MCV 90.8      MCH 26.9 (*)     MCHC 29.6 (*)     RDW 17.9 (*)     Platelet 203      MPV 11.1 (*)     Neut % 64.3      Lymph % 22.2      Mono % 10.6      Eos % 1.6      Basophil % 0.7      Imm Grans % 0.6      Neut # 4.31      Lymph # 1.49      Mono # 0.71      Eos # 0.11      Baso # 0.05      Imm Gran # 0.04      NRBC% 0.0     URINALYSIS, REFLEX TO URINE CULTURE - Abnormal    Color, UA Light-Orange (*)     Appearance, UA Turbid (*)     Specific Gravity, UA 1.007      pH, UA 7.0      Protein, UA 1+ (*)     Glucose, UA Normal      Ketones, UA Negative      Blood, UA 2+ (*)     Bilirubin, UA Negative      Urobilinogen, UA Normal      Nitrites, UA Negative      Leukocyte Esterase,  (*)     RBC, UA 50-99 (*)     WBC, UA >100 (*)     WBC Clumps, UA Many (*)     Bacteria, UA Many (*)     Squamous Epithelial Cells, UA None Seen      Mucous, UA Moderate (*)     Amorphous Crystal, UA Occasional (*)    MAGNESIUM - Normal    Magnesium Level 2.40     TROPONIN I - Normal    Troponin-I <0.010     CULTURE, URINE   BLOOD CULTURE [facility]   BLOOD CULTURE [facility]   CBC W/ AUTO DIFFERENTIAL    Narrative:     The following orders were created for panel order CBC auto differential.  Procedure                               Abnormality         Status                     ---------                               -----------        "  ------                     CBC with Differential[[id number]]       Abnormal            Final result                 Please view results for these tests on the individual orders.   LACTIC ACID, PLASMA   URINALYSIS, REFLEX TO URINE CULTURE          Imaging Results              X-Ray Chest AP Portable (In process)                      Medications   sodium chloride 0.9% bolus 1,000 mL 1,000 mL (0 mLs Intravenous Stopped 6/21/25 0305)   sodium chloride 0.9% bolus 1,000 mL 1,000 mL (1,000 mLs Intravenous New Bag 6/21/25 3603)     Medical Decision Making  69-year-old paraplegic nonverbal trach and PEG tube dependent at baseline sent from nursing home because family was concerned that he did "not look right"  Nursing home did not send the patient here for any other reason per report  Labs at nursing home and here with pre renal brian  Patient tachycardic on arrival  Will hydrate, obtain labs, ua, reassess     Differential diagnosis (including but not limited to):   Dehydration, brian, electrolyte derangement     Problems Addressed:  BRIAN (acute kidney injury): acute illness or injury that poses a threat to life or bodily functions    Amount and/or Complexity of Data Reviewed  Independent Historian: EMS     Details: EMS reports that nursing home staff stated that family visit the patient and told the staff they wanted him sent to the hospital because the pt "didn't look good". EMS reports that family left the nursing home and called 911 multiple times about the pt. EMS states that nursing home staff reports that the patient is at his baseline and has not had any concerning symptoms. EMS reports pts vitals were stable throughout the entire transport.    Labs: ordered. Decision-making details documented in ED Course.  Radiology: ordered. Decision-making details documented in ED Course.     Details: CXR - no obvious infiltrates, consolidations, pleural effusions, or pneumothorax.      Discussion of management or test " "interpretation with external provider(s): Discussed with [facility] - will admit     Risk  Decision regarding hospitalization.            Scribe Attestation:   Scribe #1: I performed the above scribed service and the documentation accurately describes the services I performed. I attest to the accuracy of the note.    Attending Attestation:           Physician Attestation for Scribe:  Physician Attestation Statement for Scribe #1: I, Robin Diego IV, MD, reviewed documentation, as scribed by Kavitha Fitzpatrick in my presence, and it is both accurate and complete.             ED Course as of 06/21/25 0445   Sat Jun 21, 2025   0402 69-year-old paraplegic nonverbal trach and PEG tube dependent at baseline sent from nursing home because family was concerned that he did "not look right"  Nursing home did not send the patient here for any other reason per report  Labs at nursing home and here with pre renal brian  Patient tachycardic on arrival  Will hydrate, obtain labs, ua, reassess [AC]   0433 ICU will admit   [AC]      ED Course User Index  [AC] Robin Diego IV, MD                           Clinical Impression:  Final diagnoses:  [R06.02] SOB (shortness of breath)  [N17.9] BRIAN (acute kidney injury)          ED Disposition Condition    Admit                       [1]   Social History  Tobacco Use    Smoking status: Never    Smokeless tobacco: Never   Substance Use Topics    Alcohol use: Not Currently    Drug use: Not Currently        Robin Diego IV, MD  06/21/25 0441    "

## 2025-06-21 NOTE — ED NOTES
May 29, 2024        Otis Garcia  [address]  [facility] [address]        Martínez Berg,    I have been trying to reach you by phone as part of [facility]s Care Management Program.    We would like to partner with you to lower your health risks and reach personal goals for healthy living. We work with you and your doctor to offer support and resources to improve your health.    [facility]’s Care Management Program Benefits:    Convenient  Specially trained RN  Review of care plan and what to do after discharge  Medication review with a Pharmacist as needed  Connection to health care, social, and community resources    Please call me.  You can reach me at    [phone]  8:30 AM to 5:00 PM CST (Monday-Friday)     If I am not available, you can leave a message on my confidential voicemail. I will return your call within one business day.    We want to help you live well and look forward to hearing from you!    Sincerely,    Yesenia Ortiz, RN  [facility]           Bed: 28  Expected date:   Expected time:   Means of arrival:   Comments:  ems

## 2025-06-21 NOTE — CONSULTS
[address]  Wound Care    Patient Name:  Delio Daniel Jr.   MRN:  [id number]  Date: 6/21/2025  Diagnosis: BRIAN (acute kidney injury)    History:     Past Medical History:   Diagnosis Date    Arthritis     Atrial fibrillation     BPH (benign prostatic hyperplasia)     Cardiac arrest     Coronary artery disease     Cyst, kidney, acquired     Diverticulosis     Hyperlipidemia     Hypertension     MI (myocardial infarction)     Obesity     Steatosis of liver     Stroke        Social History[1]    Precautions:     Allergies as of 06/20/2025    (No Known Allergies)       WO Assessment Details/Treatment      06/21/25 1324   WOCN Assessment   Visit Date 06/21/25   Visit Time 1324   Consult Type New   [facility] Speciality Wound   Intervention chart review;assessed;changed;applied;orders   Teaching on-going        Wound 01/05/25 2100 Pressure Injury medial Coccyx #1   Date First Assessed/Time First Assessed: 01/05/25 2100   Present on Original Admission: Yes  Primary Wound Type: Pressure Injury  Orientation: medial  Location: Coccyx  Wound Number: #1  Is this injury device related?: No   Wound Image    Pressure Injury Stage 4   Dressing Appearance Moist drainage   Drainage Amount Moderate   Drainage Characteristics/Odor Serous   Appearance Red;Moist   Tissue loss description Full thickness   Black (%), Wound Tissue Color 0 %   Red (%), Wound Tissue Color 100 %   Yellow (%), Wound Tissue Color 0 %   Periwound Area Dry;Pink;Scar tissue   Wound Edges Rolled/closed;Irregular   Wound Length (cm) 9.5 cm   Wound Width (cm) 6.2 cm   Wound Depth (cm) 3.1 cm   Wound Volume (cm^3) 95.604 cm^3   Wound Surface Area (cm^2) 46.26 cm^2   Care Cleansed with:;Antimicrobial agent   Dressing Other (comment)  (packed with vashe moistened kerlix, covered with abd pad, secured with tape.)        Wound 03/04/25 2000 Skin Tear Scrotum   Date First Assessed/Time First Assessed: 03/04/25 2000   Present on Original Admission: No   Primary Wound Type: Skin Tear  Location: Scrotum   Wound Image    Drainage Amount other (see comments)  (liquid stool)   Appearance Red;Moist   Tissue loss description Not applicable   Black (%), Wound Tissue Color 0 %   Red (%), Wound Tissue Color 100 %   Yellow (%), Wound Tissue Color 0 %   Periwound Area Moist;Redness   Care Cleansed with:;Other (see comments)  (remedy skin cleanser. Patted dry. applied liberal amount of zinc oxide)   WOCN new consult for wounds. Chart reviewed. Pt admitted with chronic wounds. Lives at long term facility. Vent dependent. Pt presently on contact precautions. Proper PPE donned prior to entry. Daughter present at bedside. Introduced myself and wound care team. Explained reason for visit. Pt is contracted and required  assistance x 3 to roll onto side. Pt having episodes of diarrhea. Bindu anal area red with small amount of bleeding with cleaning. Recommendations: cleanse with remedy skin cleanser. Pat dry. Apply liberal amount of zinc oxide, leave open to air. Pressure injury to sacrum. Cleanse well with Dakins. Pack with vashe moistened kerlix, cover with dry abd pad, secure with tape. BID and prn soilage. Keep groin areas dry; may use miconazole powder BID redness. Plan of care discussed with daughter and nurse.  Pt in on YOLETTE mattress; heel protectors in place. Wound care to follow. Orders placed.  06/21/2025         [1]   Social History  Socioeconomic History    Marital status:     Number of children: 9   Occupational History    Occupation: retired   Tobacco Use    Smoking status: Never    Smokeless tobacco: Never   Substance and Sexual Activity    Alcohol use: Not Currently    Drug use: Not Currently    Sexual activity: Not Currently     Partners: Female     Social Drivers of Health     Financial Resource Strain: Patient Declined (6/21/2025)    Overall Financial Resource Strain (CARDIA)     Difficulty of Paying Living Expenses: Patient declined   Food Insecurity: Patient  Declined (6/21/2025)    Hunger Vital Sign     Worried About Running Out of Food in the Last Year: Patient declined     Ran Out of Food in the Last Year: Patient declined   Transportation Needs: Patient Declined (6/21/2025)    PRAPARE - Transportation     Lack of Transportation (Medical): Patient declined     Lack of Transportation (Non-Medical): Patient declined   Physical Activity: Sufficiently Active (8/5/2024)    Exercise Vital Sign     Days of Exercise per Week: 5 days     Minutes of Exercise per Session: 30 min   Stress: Patient Declined (6/21/2025)    [employer] - Occupational Stress Questionnaire     Feeling of Stress : Patient declined   Housing Stability: Patient Declined (6/21/2025)    Housing Stability Vital Sign     Unable to Pay for Housing in the Last Year: Patient declined     Homeless in the Last Year: Patient declined

## 2025-06-21 NOTE — PLAN OF CARE
[facility] [place] [facility] notified them patient is on a vent.

## 2025-06-21 NOTE — H&P
"[facility] - [facility] ICU  Pulmonary Critical Care Note    Patient Name: Delio Daniel Jr.  MRN: [id number]  Admission Date: 6/20/2025  Hospital Length of Stay: 0 days  Code Status: Full Code  Attending Provider: Efrain Salcido MD  Primary Care Provider: No primary care provider on file.     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 due to family reporting patient "did not look good." EMS reports that family left the nursing home and called 911 multiple times about the pt. EMS states that nursing home staff reports that the patient is at his baseline and has not had any concerning symptoms. EMS reports pts vitals were stable throughout the entire transport. Upon arrival to ED, vitals show patient tachycardic (-120s) with remaining vitals stable. CMP showed hyperchloremic normal anion gap metabolic acidosis (bicarb 21; AG 12) and elevated renal indices (BUN 63; Cr 1.68) consistent with BRIAN. Urine thick and clowdy. UA suggestive of UTI. Fernandez catheter removed and new one placed. Repeat UA obtained. Patient admitted to ICU due to being ventilator dependent.    Hospital Course/Significant events:      24 Hour Interval History:  N/A    Past Medical History:   Diagnosis Date    Arthritis     Atrial fibrillation     BPH (benign prostatic hyperplasia)     Cardiac arrest     Coronary artery disease     Cyst, kidney, acquired     Diverticulosis     Hyperlipidemia     Hypertension     MI (myocardial infarction)     Obesity     Steatosis of liver     Stroke        Past " Surgical History:   Procedure Laterality Date    A-V CARDIAC PACEMAKER INSERTION Right     CARDIAC CATHETERIZATION      COLONOSCOPY W/ BIOPSIES      CRANIECTOMY Right 12/20/2023    Procedure: CRANIECTOMY;  Surgeon: Artem Can MD;  Location: [facility];  Service: Neurosurgery;  Laterality: Right;    ESOPHAGOGASTRODUODENOSCOPY W/ PEG N/A 1/2/2024    Procedure: PEG;  Surgeon: Tani Day MD;  Location: [facility] ENDOSCOPY;  Service: Gastroenterology;  Laterality: N/A;    ESOPHAGOGASTRODUODENOSCOPY W/ PEG N/A 10/30/2024    Procedure: EGD w/ Jtube placement;  Surgeon: Gabriele Salcido MD;  Location: [facility] ENDOSCOPY;  Service: Endoscopy;  Laterality: N/A;  with J tube extension    excision of colon      TRACHEOSTOMY N/A 12/29/2023    Procedure: CREATION, TRACHEOSTOMY;  Surgeon: Patricia Winslow MD;  Location: [facility];  Service: ENT;  Laterality: N/A;  REQ 1130 //  NEEDS 2 SCRUBS       Social History[1]        Current Outpatient Medications   Medication Instructions    apixaban (ELIQUIS) 5 mg, Oral, 2 times daily    ascorbic Acid (VITAMIN C) 500 mg, 2 times daily, Per PEG tube    baclofen (LIORESAL) 10 mg, Oral, 3 times daily    busPIRone (BUSPAR) 5 mg, Per G Tube, 3 times daily    cholestyramine (QUESTRAN) 4 gram packet 4 g, Daily, Via PEG tube    ferrous sulfate (FEOSOL) Tab tablet 1 tablet, Oral, With breakfast    furosemide (LASIX) 80 mg, Per G Tube, As needed (PRN)    L. acidophilus/L.bulgaricus (FLORANEX ORAL) 1 packet, PEG Tube, Daily    levetiracetam 1,500 mg, Per G Tube, 2 times daily, Nursing home reports medication hold by MD until level redraw on Monday.    LIPITOR 10 mg, Per G Tube, Daily    metoclopramide HCl (REGLAN) 10 mg, Oral, 3 times daily    multivitamin (THERAGRAN) per tablet 1 tablet, Per G Tube, Daily    ondansetron (ZOFRAN) 8 mg, Oral, Every 8 hours PRN    potassium chloride 10% (KAYCIEL) 20 mEq/15 mL oral solution 15 mLs, Oral, 3 times daily    protein supplement (PROMOD PROTEIN ORAL)  30 mLs, Per G Tube, Daily    QUEtiapine (SEROQUEL) 25 mg, Per G Tube, 2 times daily    scopolamine (TRANSDERM-SCOP) 1.3-1.5 mg (1 mg over 3 days) 1 patch, Transdermal, Every 3 days    venlafaxine 75 mg TR24 1 tablet, Per G Tube, 2 times daily       Review of patient's allergies indicates:  No Known Allergies     Current Inpatient Medications   apixaban  5 mg Per G Tube BID    [START ON 6/22/2025] mupirocin   Nasal BID       Current Intravenous Infusions   sodium bicarbonate 150 mEq in D5W 1,000 mL infusion   Intravenous Continuous             Review of Systems   Unable to perform ROS: Patient nonverbal          Objective:       Intake/Output Summary (Last 24 hours) at 6/21/2025 0714  Last data filed at 6/21/2025 0600  Gross per 24 hour   Intake --   Output 2350 ml   Net -2350 ml         Vital Signs (Most Recent):  Temp: 98.5 °F (36.9 °C) (06/21/25 0600)  Pulse: 102 (06/21/25 0645)  Resp: 19 (06/21/25 0645)  BP: 119/86 (06/21/25 0645)  SpO2: 98 % (06/21/25 0645)  Body mass index is 24.27 kg/m².  Weight: 70.3 kg (154 lb 15.7 oz) Vital Signs (24h Range):  Temp:  [98.5 °F (36.9 °C)] 98.5 °F (36.9 °C)  Pulse:  [] 102  Resp:  [11-22] 19  SpO2:  [91 %-100 %] 98 %  BP: (119-134)/(63-92) 119/86     Physical Exam  Constitutional:       General: He is not in acute distress.     Appearance: He is normal weight. He is ill-appearing.   HENT:      Head: Normocephalic and atraumatic.   Eyes:      Extraocular Movements: Extraocular movements intact.      Conjunctiva/sclera: Conjunctivae normal.      Pupils: Pupils are equal, round, and reactive to light.   Neck:      Comments: Trach in place  Cardiovascular:      Rate and Rhythm: Tachycardia present. Rhythm irregular.      Pulses: Normal pulses.      Heart sounds: Normal heart sounds. No murmur heard.     No friction rub. No gallop.   Pulmonary:      Effort: Pulmonary effort is normal. No respiratory distress.      Breath sounds: Normal breath sounds. No stridor. No wheezing,  "rhonchi or rales.   Abdominal:      General: Abdomen is flat. Bowel sounds are normal. There is no distension.      Palpations: Abdomen is soft.      Tenderness: There is no abdominal tenderness. There is no guarding.      Comments: PEG tube in place   Musculoskeletal:         General: No swelling.      Right lower leg: No edema.      Left lower leg: No edema.   Skin:     General: Skin is warm and dry.   Neurological:      Comments: Opens eyes spontaneously, tracks with eyes, non-verbal, moves LUE spontaneously, does not follow commands           Lines/Drains/Airways       Drain  Duration                  Gastrostomy/Enterostomy 10/30/24 1200 Gastrostomy-jejunostomy feeding 233 days         Urethral Catheter 06/21/25 0510 Double-lumen <1 day              Airway  Duration             Adult Surgical Airway 08/19/24 0120 Shiley Extra Large Cuffed Distal 6.0/ 75mm 306 days              Peripheral Intravenous Line  Duration             Peripheral IV Single Lumen 06/21/25 0220 20 G Anterior;Right Shoulder <1 day    Peripheral IV Single Lumen 06/21/25 0526 20 G Anterior;Distal;Right Forearm <1 day                    Significant Labs:    Lab Results   Component Value Date    WBC 6.71 06/20/2025    HGB 12.3 (L) 06/20/2025    HCT 41.6 (L) 06/20/2025    MCV 90.8 06/20/2025     06/20/2025           BMP  Lab Results   Component Value Date     06/20/2025    K 5.1 06/20/2025    CO2 21 (L) 06/20/2025    BUN 63.6 (H) 06/20/2025    CREATININE 1.68 (H) 06/20/2025    CALCIUM 8.5 (L) 06/20/2025    AGAP 12.0 06/20/2025    EGFRNONAA 61 04/23/2022         ABG  No results for input(s): "PH", "PO2", "PCO2", "HCO3", "POCBASEDEF" in the last 168 hours.    Mechanical Ventilation Support:  Vent Mode: A/C (06/21/25 0249)  Ventilator Initiated: Yes (06/20/25 2312)  Set Rate: 20 BPM (06/21/25 0249)  Vt Set: 500 mL (06/21/25 0249)  PEEP/CPAP: 5 cmH20 (06/21/25 0249)  Oxygen Concentration (%): 21 (06/21/25 0600)  Peak Airway Pressure: 26 " cmH20 (06/21/25 0249)  Total Ve: 9.5 L/m (06/21/25 0249)  F/VT Ratio<105 (RSBI): (!) 33.68 (06/21/25 0249)      Significant Imaging:  I have reviewed the pertinent imaging within the past 24 hours.        Assessment/Plan:     Assessment  Nonoliguric acute kidney injury  Afib with RVR  Possible UTI; hx of recurrent UTI due to chronic indwelling underwood  Chronic respiratory failure now trach and PEG dependent (2023),   Hypertension  Hyperlipidemia  Coronary artery disease  STEMI (2003)  Atrial fibrillation on eliquis  2nd degree AV block and Vfib arrest s/p pacemaker/AICD placement  Hx of hemorrhagic CVA  Neuroendocrine carcinoma of the small bowel s/p bowel resection (2018)    Plan  Admit to ICU due to being ventilator dependent  Not requiring sedation  Not requiring vasopressors  Continue IVF resuscitation for BRIAN  Repeat post underwood exchange UA pending  Will discuss restarting either diltiazem or metoprolol succinate for AFib    DVT Prophylaxis: Eliquis  GI Prophylaxis: None     32 minutes of critical care was time spent personally by me on the following activities: development of treatment plan with patient or surrogate and bedside caregivers, discussions with consultants, evaluation of patient's response to treatment, examination of patient, ordering and performing treatments and interventions, ordering and review of laboratory studies, ordering and review of radiographic studies, pulse oximetry, re-evaluation of patient's condition.  This critical care time did not overlap with that of any other provider or involve time for any procedures.     Humberto Quintana MD  [facility]       [1]   Social History  Socioeconomic History    Marital status:     Number of children: 9   Occupational History    Occupation: retired   Tobacco Use    Smoking status: Never    Smokeless tobacco: Never   Substance and Sexual Activity    Alcohol use: Not Currently    Drug use: Not Currently    Sexual activity: Not  Currently     Partners: Female     Social Drivers of Health     Financial Resource Strain: Patient Declined (6/21/2025)    Overall Financial Resource Strain (CARDIA)     Difficulty of Paying Living Expenses: Patient declined   Food Insecurity: Patient Declined (6/21/2025)    Hunger Vital Sign     Worried About Running Out of Food in the Last Year: Patient declined     Ran Out of Food in the Last Year: Patient declined   Transportation Needs: Patient Declined (6/21/2025)    PRAPARE - Transportation     Lack of Transportation (Medical): Patient declined     Lack of Transportation (Non-Medical): Patient declined   Physical Activity: Sufficiently Active (8/5/2024)    Exercise Vital Sign     Days of Exercise per Week: 5 days     Minutes of Exercise per Session: 30 min   Stress: Patient Declined (6/21/2025)    [employer] - Occupational Stress Questionnaire     Feeling of Stress : Patient declined   Housing Stability: Patient Declined (6/21/2025)    Housing Stability Vital Sign     Unable to Pay for Housing in the Last Year: Patient declined     Homeless in the Last Year: Patient declined

## 2025-06-21 NOTE — PLAN OF CARE
Problem: Infection  Goal: Absence of Infection Signs and Symptoms  Outcome: Progressing     Problem: Sepsis/Septic Shock  Goal: Optimal Coping  Outcome: Progressing  Goal: Absence of Bleeding  Outcome: Progressing  Goal: Blood Glucose Level Within Targeted Range  Outcome: Progressing  Goal: Absence of Infection Signs and Symptoms  Outcome: Progressing  Goal: Optimal Nutrition Intake  Outcome: Progressing     Problem: Adult Inpatient Plan of Care  Goal: Plan of Care Review  Outcome: Progressing  Goal: Patient-Specific Goal (Individualized)  Outcome: Progressing  Goal: Absence of Hospital-Acquired Illness or Injury  Outcome: Progressing  Goal: Optimal Comfort and Wellbeing  Outcome: Progressing  Goal: Readiness for Transition of Care  Outcome: Progressing     Problem: Pneumonia  Goal: Fluid Balance  Outcome: Progressing  Goal: Resolution of Infection Signs and Symptoms  Outcome: Progressing  Goal: Effective Oxygenation and Ventilation  Outcome: Progressing     Problem: Wound  Goal: Optimal Coping  Outcome: Progressing  Goal: Optimal Functional Ability  Outcome: Progressing  Goal: Absence of Infection Signs and Symptoms  Outcome: Progressing  Goal: Improved Oral Intake  Outcome: Progressing  Goal: Optimal Pain Control and Function  Outcome: Progressing  Goal: Skin Health and Integrity  Outcome: Progressing  Goal: Optimal Wound Healing  Outcome: Progressing     Problem: Acute Kidney Injury/Impairment  Goal: Fluid and Electrolyte Balance  Outcome: Progressing  Goal: Improved Oral Intake  Outcome: Progressing  Goal: Effective Renal Function  Outcome: Progressing     Problem: Skin Injury Risk Increased  Goal: Skin Health and Integrity  Outcome: Progressing

## 2025-06-21 NOTE — DISCHARGE SUMMARY
"[facility] Discharge Summary    Admitting Physician: Itz Francisco MD  Attending Physician: Efrain Salcido MD  Date of Admit: 6/20/2025  Date of Discharge: 6/21/2025    Condition: Stable  Outcome: Patient tolerated treatment/procedure well without complication and is now ready for discharge.  DISPOSITION:  Nursing home        Discharge Diagnoses:     Problem List[1]    Principal Problem:  BRIAN (acute kidney injury)    Consultants and Procedures:     Consultants:  IP CONSULT TO REGISTERED DIETITIAN/NUTRITIONIST  WOUND CARE CONSULT  IP CONSULT TO SOCIAL WORK/CASE MANAGEMENT    Procedures:   * No surgery found *      Brief Admission History:      Delio Daniel is a 69 year old  male with a past medical history of trach/PEG dependence (2023), hypertension, hyperlipidemia, coronary artery disease, STEMI (2003), atrial fibrillation on eliquis, 2nd degree AV block and Vfib arrest s/p pacemaker/AICD placement, recurrent UTI, hemorrhagic CVA, 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 ED [facility]06[facility]21/2025[facility] due to family reporting patient "did not look good." EMS reports that family left the nursing home and called 911 multiple times about the pt. EMS states that nursing home staff reports that the patient is at his baseline and has not had any concerning symptoms. EMS reports pts vitals were stable throughout the entire transport. Upon arrival to ED, vitals show patient tachycardic (-120s) with remaining vitals stable. CMP showed hyperchloremic normal anion gap metabolic acidosis (bicarb 21; AG 12) and elevated renal indices (BUN 63; Cr 1.68) consistent with BRIAN. Urine thick and clowdy. UA suggestive of UTI. Fernandez catheter removed and new one placed. Repeat UA obtained. Patient admitted to ICU due to being ventilator dependent.     Hospital Course with Pertinent Findings:      Patient was admitted due " "to ill appearing at nursing home. Patient admitted to ICU due to being vent dependent and having BRIAN. Patient BRIAN improved after fluid resuscitation. Patient stable for discharge and will be discharged back to nursing home.    Discharge physical exam:  Vitals  BP: 119/86  Temp: 98.4 °F (36.9 °C)  Temp Source: Oral  Pulse: 102  Resp: 19  SpO2: 98 %  Height: 5' 7" (170.2 cm)  Weight: 70.3 kg (154 lb 15.7 oz)    Constitutional:       General: He is not in acute distress.     Appearance: He is normal weight. He is ill-appearing.   HENT:      Head: Normocephalic and atraumatic.   Eyes:      Extraocular Movements: Extraocular movements intact.      Conjunctiva/sclera: Conjunctivae normal.      Pupils: Pupils are equal, round, and reactive to light.   Neck:      Comments: Trach in place  Cardiovascular:      Rate and Rhythm: Tachycardia present. Rhythm irregular.      Pulses: Normal pulses.      Heart sounds: Normal heart sounds. No murmur heard.     No friction rub. No gallop.   Pulmonary:      Effort: Pulmonary effort is normal. No respiratory distress.      Breath sounds: Normal breath sounds. No stridor. No wheezing, rhonchi or rales.   Abdominal:      General: Abdomen is flat. Bowel sounds are normal. There is no distension.      Palpations: Abdomen is soft.      Tenderness: There is no abdominal tenderness. There is no guarding.      Comments: PEG tube in place   Musculoskeletal:         General: No swelling.      Right lower leg: No edema.      Left lower leg: No edema.   Skin:     General: Skin is warm and dry.   Neurological:      Comments: Opens eyes spontaneously, tracks with eyes, non-verbal, moves LUE spontaneously, does not follow commands     TIME SPENT ON DISCHARGE: 35 minutes    Discharge Medications:         Medication List        ASK your doctor about these medications      apixaban 5 mg Tab  Commonly known as: ELIQUIS  Take 1 tablet (5 mg total) by mouth 2 (two) times daily.     ascorbic Acid 500 mg " Cpsr  Commonly known as: VITAMIN C  500 mg 2 (two) times daily. Per PEG tube     baclofen 10 MG tablet  Commonly known as: LIORESAL  Take 10 mg by mouth 3 (three) times daily.     busPIRone 5 MG Tab  Commonly known as: BUSPAR  5 mg by Per G Tube route 3 (three) times daily.     cholestyramine 4 gram packet  Commonly known as: QUESTRAN  4 g once daily. Via PEG tube     ferrous sulfate Tab tablet  Commonly known as: FEOSOL  Take 1 tablet by mouth daily with breakfast.     FLORANEX ORAL  1 packet by PEG Tube route Daily.     furosemide 80 MG tablet  Commonly known as: LASIX  80 mg by Per G Tube route as needed (for Edema).     levetiracetam 500 mg/5 mL (5 mL) Soln  1,500 mg by Per G Tube route 2 (two) times daily. Nursing home reports medication hold by MD until level redraw on Monday.     LIPITOR 10 mg tablet  Generic drug: atorvastatin  10 mg by Per G Tube route once daily.     metoclopramide HCl 5 mg/5 mL Soln  Commonly known as: REGLAN  Take 10 mg by mouth 3 (three) times daily.     multivitamin per tablet  Commonly known as: THERAGRAN  1 tablet by Per G Tube route once daily.     ondansetron 4 MG tablet  Commonly known as: ZOFRAN  Take 8 mg by mouth every 8 (eight) hours as needed for Nausea.     potassium chloride 10% 20 mEq/15 mL oral solution  Commonly known as: KAYCIEL  Take 15 mLs by mouth 3 (three) times daily.     PROMOD PROTEIN ORAL  30 mLs by Per G Tube route once daily.     QUEtiapine 25 MG Tab  Commonly known as: SEROQUEL  25 mg by Per G Tube route 2 (two) times daily.     scopolamine 1.3-1.5 mg (1 mg over 3 days)  Commonly known as: TRANSDERM-SCOP  Place 1 patch onto the skin Every 3 (three) days.     venlafaxine 75 mg Tr24  1 tablet by Per G Tube route 2 (two) times a day.                Discharge Instructions:         Delio Daniel Jr. [facility]    No discharge procedures on file.     Follow-Up Appointments:        To address at follow-up:  -The following labs are  to be drawn at the [facility] visit:   -The following imaging studies are to be ordered at the post campos visit:     At this time, Delio Daniel Jr. is determined to have maximized benefits of IP hospitalization. he is discharged in stable condition with OP f/u recommendations and instructions. All questions answered, and patient verbalized agreement with the POC. They were given return precautions prior to d/c including symptoms that should prompt return to ED or to call PCP. Total time spent of DC of 35 minutes.       Koki Barlow MD  [facility]  HO-[facility]               [1]   Patient Active Problem List  Diagnosis    Neuroendocrine carcinoma of small bowel    Nodule of left lung    Hypertension    Hyperlipidemia LDL goal <70    Hypokalemia    Coronary artery disease involving native heart without angina pectoris    Second degree AV block    Cardiac arrest with ventricular fibrillation    Cardiac pacemaker in situ    History of deep vein thrombosis (DVT) of lower extremity    Current use of long term anticoagulation    Bilateral lower extremity edema    Benign prostatic hyperplasia    Seizure    Sepsis    Acute cystitis without hematuria    Nausea and vomiting    Esophagitis    Pneumonia due to infectious organism    UTI (urinary tract infection)    Leukocytosis    Unstageable pressure ulcer of sacral region    Pressure injury of left knee, stage 3    Pressure ulcer of left ankle, stage 4    Pressure ulcer of sacral region, stage 4    Infection due to ESBL-producing Klebsiella pneumoniae    Anemia requiring transfusions    Severe malnutrition    BRIAN (acute kidney injury)    Atrial fibrillation with rapid ventricular response

## 2025-06-22 NOTE — PLAN OF CARE
Problem: Adult Inpatient Plan of Care  Goal: Plan of Care Review  Outcome: Adequate for Care Transition  Goal: Patient-Specific Goal (Individualized)  Outcome: Adequate for Care Transition  Goal: Absence of Hospital-Acquired Illness or Injury  Outcome: Adequate for Care Transition  Goal: Optimal Comfort and Wellbeing  Outcome: Adequate for Care Transition  Goal: Readiness for Transition of Care  Outcome: Adequate for Care Transition     Problem: Sepsis/Septic Shock  Goal: Optimal Coping  Outcome: Adequate for Care Transition  Goal: Absence of Bleeding  Outcome: Adequate for Care Transition  Goal: Blood Glucose Level Within Targeted Range  Outcome: Adequate for Care Transition  Goal: Absence of Infection Signs and Symptoms  Outcome: Adequate for Care Transition  Goal: Optimal Nutrition Intake  Outcome: Adequate for Care Transition     Problem: Wound  Goal: Optimal Coping  Outcome: Adequate for Care Transition  Goal: Optimal Functional Ability  Outcome: Adequate for Care Transition  Goal: Absence of Infection Signs and Symptoms  Outcome: Adequate for Care Transition  Goal: Improved Oral Intake  Outcome: Adequate for Care Transition  Goal: Optimal Pain Control and Function  Outcome: Adequate for Care Transition  Goal: Skin Health and Integrity  Outcome: Adequate for Care Transition  Goal: Optimal Wound Healing  Outcome: Adequate for Care Transition     Problem: Pneumonia  Goal: Fluid Balance  Outcome: Adequate for Care Transition  Goal: Resolution of Infection Signs and Symptoms  Outcome: Adequate for Care Transition  Goal: Effective Oxygenation and Ventilation  Outcome: Adequate for Care Transition     Problem: Skin Injury Risk Increased  Goal: Skin Health and Integrity  Outcome: Adequate for Care Transition     Problem: Acute Kidney Injury/Impairment  Goal: Fluid and Electrolyte Balance  Outcome: Adequate for Care Transition  Goal: Improved Oral Intake  Outcome: Adequate for Care Transition  Goal: Effective Renal  Function  Outcome: Adequate for Care Transition

## 2025-06-23 LAB — BACTERIA UR CULT: ABNORMAL

## 2025-06-26 LAB
BACTERIA BLD CULT: NORMAL
BACTERIA BLD CULT: NORMAL

## 2025-07-15 ENCOUNTER — LAB REQUISITION (OUTPATIENT)
Dept: LAB | Facility: HOSPITAL | Age: 70
End: 2025-07-15
Payer: MEDICARE

## 2025-07-15 DIAGNOSIS — E86.0 DEHYDRATION: ICD-10-CM

## 2025-07-15 LAB
ANION GAP SERPL CALC-SCNC: 8 MEQ/L
BASOPHILS # BLD AUTO: 0.04 X10(3)/MCL
BASOPHILS NFR BLD AUTO: 0.6 %
BUN SERPL-MCNC: 20.7 MG/DL (ref 8.4–25.7)
CALCIUM SERPL-MCNC: 8.3 MG/DL (ref 8.8–10)
CHLORIDE SERPL-SCNC: 113 MMOL/L (ref 98–107)
CO2 SERPL-SCNC: 23 MMOL/L (ref 23–31)
CREAT SERPL-MCNC: 0.63 MG/DL (ref 0.72–1.25)
CREAT/UREA NIT SERPL: 33
EOSINOPHIL # BLD AUTO: 0.07 X10(3)/MCL (ref 0–0.9)
EOSINOPHIL NFR BLD AUTO: 1.1 %
ERYTHROCYTE [DISTWIDTH] IN BLOOD BY AUTOMATED COUNT: 17.2 % (ref 11.5–17)
GFR SERPLBLD CREATININE-BSD FMLA CKD-EPI: >60 ML/MIN/1.73/M2
GLUCOSE SERPL-MCNC: 82 MG/DL (ref 82–115)
HCT VFR BLD AUTO: 33.3 % (ref 42–52)
HGB BLD-MCNC: 10.3 G/DL (ref 14–18)
IMM GRANULOCYTES # BLD AUTO: 0.02 X10(3)/MCL (ref 0–0.04)
IMM GRANULOCYTES NFR BLD AUTO: 0.3 %
LYMPHOCYTES # BLD AUTO: 1.84 X10(3)/MCL (ref 0.6–4.6)
LYMPHOCYTES NFR BLD AUTO: 29 %
MCH RBC QN AUTO: 26.5 PG (ref 27–31)
MCHC RBC AUTO-ENTMCNC: 30.9 G/DL (ref 33–36)
MCV RBC AUTO: 85.6 FL (ref 80–94)
MONOCYTES # BLD AUTO: 0.37 X10(3)/MCL (ref 0.1–1.3)
MONOCYTES NFR BLD AUTO: 5.8 %
NEUTROPHILS # BLD AUTO: 4 X10(3)/MCL (ref 2.1–9.2)
NEUTROPHILS NFR BLD AUTO: 63.2 %
NRBC BLD AUTO-RTO: 0 %
PLATELET # BLD AUTO: 254 X10(3)/MCL (ref 130–400)
PMV BLD AUTO: 11.1 FL (ref 7.4–10.4)
POTASSIUM SERPL-SCNC: 3.6 MMOL/L (ref 3.5–5.1)
RBC # BLD AUTO: 3.89 X10(6)/MCL (ref 4.7–6.1)
SODIUM SERPL-SCNC: 144 MMOL/L (ref 136–145)
WBC # BLD AUTO: 6.34 X10(3)/MCL (ref 4.5–11.5)

## 2025-07-15 PROCEDURE — 85025 COMPLETE CBC W/AUTO DIFF WBC: CPT | Performed by: NURSE PRACTITIONER

## 2025-07-15 PROCEDURE — 80048 BASIC METABOLIC PNL TOTAL CA: CPT | Performed by: NURSE PRACTITIONER

## 2025-08-04 PROBLEM — N30.00 ACUTE CYSTITIS WITHOUT HEMATURIA: Status: RESOLVED | Noted: 2024-07-21 | Resolved: 2025-08-04

## 2025-08-04 PROBLEM — N17.9 AKI (ACUTE KIDNEY INJURY): Status: RESOLVED | Noted: 2025-06-21 | Resolved: 2025-08-04

## 2025-08-05 ENCOUNTER — LAB REQUISITION (OUTPATIENT)
Dept: LAB | Facility: HOSPITAL | Age: 70
End: 2025-08-05
Payer: MEDICARE

## 2025-08-05 DIAGNOSIS — I63.513 CEREBRAL INFARCTION DUE TO UNSPECIFIED OCCLUSION OR STENOSIS OF BILATERAL MIDDLE CEREBRAL ARTERIES: ICD-10-CM

## 2025-08-05 PROCEDURE — 80177 DRUG SCRN QUAN LEVETIRACETAM: CPT | Performed by: INTERNAL MEDICINE

## 2025-08-06 LAB — LEVETIRACETAM SERPL-MCNC: 33.2 MCG/ML (ref 10–40)

## 2025-08-10 ENCOUNTER — LAB REQUISITION (OUTPATIENT)
Dept: LAB | Facility: HOSPITAL | Age: 70
End: 2025-08-10
Payer: MEDICARE

## 2025-08-10 DIAGNOSIS — N39.0 URINARY TRACT INFECTION, SITE NOT SPECIFIED: ICD-10-CM

## 2025-08-10 LAB
BACTERIA #/AREA URNS AUTO: ABNORMAL /HPF
BILIRUB UR QL STRIP.AUTO: NEGATIVE
CLARITY UR: CLEAR
COLOR UR AUTO: ABNORMAL
GLUCOSE UR QL STRIP: NEGATIVE
HGB UR QL STRIP: ABNORMAL
KETONES UR QL STRIP: NEGATIVE
LEUKOCYTE ESTERASE UR QL STRIP: ABNORMAL
MUCOUS THREADS URNS QL MICRO: ABNORMAL /LPF
NITRITE UR QL STRIP: NEGATIVE
PH UR STRIP: >=9 [PH]
PROT UR QL STRIP: 100
RBC #/AREA URNS AUTO: ABNORMAL /HPF
SP GR UR STRIP.AUTO: 1.01 (ref 1–1.03)
SQUAMOUS #/AREA URNS AUTO: ABNORMAL /HPF
TRI-PHOS CRY UR QL COMP ASSIST: ABNORMAL
UROBILINOGEN UR STRIP-ACNC: 1
WBC #/AREA URNS AUTO: ABNORMAL /HPF
WBC CLUMPS UR QL AUTO: ABNORMAL

## 2025-08-10 PROCEDURE — 87086 URINE CULTURE/COLONY COUNT: CPT | Performed by: INTERNAL MEDICINE

## 2025-08-10 PROCEDURE — 81003 URINALYSIS AUTO W/O SCOPE: CPT | Performed by: INTERNAL MEDICINE

## 2025-08-12 LAB
BACTERIA UR CULT: ABNORMAL

## 2025-09-02 DIAGNOSIS — C7A.8 NEUROENDOCRINE CARCINOMA OF SMALL BOWEL: Primary | ICD-10-CM

## 2025-09-05 PROBLEM — I48.91 ATRIAL FIBRILLATION: Status: ACTIVE | Noted: 2025-09-05

## 2025-09-05 PROBLEM — Z79.01 CHRONIC ANTICOAGULATION: Status: ACTIVE | Noted: 2025-09-05

## (undated) DEVICE — Device

## (undated) DEVICE — SYR 10CC LUER LOCK

## (undated) DEVICE — KIT SURGICAL TURNOVER

## (undated) DEVICE — GLOVE PROTEXIS LTX MICRO 6.5

## (undated) DEVICE — DRAPE CRANIOTOMY T SURG STRL

## (undated) DEVICE — SUT CHROMIC 2-0 SH 27IN BRN

## (undated) DEVICE — DISH PETRI MED 3.5IN

## (undated) DEVICE — SUT VICRYL PLUS 3-0 SH 18IN

## (undated) DEVICE — RESERVOIR JACKSON-PRATT 100CC

## (undated) DEVICE — ELECTRODE BLADE INSULATED 1 IN

## (undated) DEVICE — SUT VICRYL PLUS 4-0 TF 18IN

## (undated) DEVICE — STAPLER SKIN WIDE

## (undated) DEVICE — SWAB CULTURETTE SINGLE

## (undated) DEVICE — TIP SUCTION YANKAUER

## (undated) DEVICE — POSITIONER HEAD ADULT

## (undated) DEVICE — BOWL STERILE LARGE 32OZ

## (undated) DEVICE — TOWEL OR DISP STRL BLUE 4/PK

## (undated) DEVICE — GOWN SMARTSLEEVE AAMI LVL4 LG

## (undated) DEVICE — TUBE SUCTION MEDI-VAC STERILE

## (undated) DEVICE — TUBING O2 FEMALE CONN 13FT

## (undated) DEVICE — POSITIONER HEEL FOAM CONVOLTD

## (undated) DEVICE — GLOVE PROTEXIS PI SYN SURG 7.5

## (undated) DEVICE — CORD BIPOLAR 12 FOOT

## (undated) DEVICE — NDL 27G X 1 1/4

## (undated) DEVICE — BLADE ELECTRO EDGE INSULATED

## (undated) DEVICE — KIT SURGICAL COLON .25 1.1OZ

## (undated) DEVICE — BANDAGE KERLIX AMD

## (undated) DEVICE — KIT SURGIFLO HEMOSTATIC MATRIX

## (undated) DEVICE — SEE MEDLINE ITEM 152745

## (undated) DEVICE — KIT PEG PULL SAFETY 24FR

## (undated) DEVICE — PERFORATOR SURG CRAN 14X11MM

## (undated) DEVICE — HEMOSTAT SURGICEL 4X8IN

## (undated) DEVICE — KIT CANIST SUCTION 1200CC

## (undated) DEVICE — TUBING IRR BIPOLAR CORD 12FT

## (undated) DEVICE — DRESSING TELFA N ADH 3X8

## (undated) DEVICE — GLOVE PROTEXIS BLUE LATEX 7

## (undated) DEVICE — DRESSING TRANS 2X2 TEGADERM

## (undated) DEVICE — SPONGE COTTON TRAY 4X4IN

## (undated) DEVICE — GLOVE PROTEXIS PI SYN SURG 6.0

## (undated) DEVICE — GLOVE PROTEXIS BLUE LATEX 6.5

## (undated) DEVICE — GLOVE PROTEXIS LTX MICRO  7.5

## (undated) DEVICE — SUT SILK 0 SH 30IN BLK BR

## (undated) DEVICE — CONTAINER SPECIMEN STRL 4OZ

## (undated) DEVICE — GAUZE SPONGE 4X4 12PLY

## (undated) DEVICE — CLIP SCALP SCALPFIX MAGAZINE

## (undated) DEVICE — DRAIN JP STD PENROSE 1/4X12IN

## (undated) DEVICE — DRAPE OPMI STERILE

## (undated) DEVICE — SPONGE SURGIFOAM 100 8.5X12X10

## (undated) DEVICE — DRESSING TELFA N ADH 3X8IN

## (undated) DEVICE — TOOL MR8 F2 TAPER 7CM 2.3MM

## (undated) DEVICE — SPNG CHERRY DISECT XRAY DTECT

## (undated) DEVICE — DRESSING TELFA + BARR 4X6IN

## (undated) DEVICE — DRAIN JACKSON PRATT TRCR 10FR

## (undated) DEVICE — SUT 3-0 12-18IN SILK

## (undated) DEVICE — MARKER WRITESITE SKIN CHLRAPRP

## (undated) DEVICE — SUT VICRYL 2-0 8-18 CP-2

## (undated) DEVICE — DRAPE FLUID WARMER 44X44IN

## (undated) DEVICE — ELECTRODE PATIENT RETURN DISP

## (undated) DEVICE — SOL IRRI STRL WATER 1000ML

## (undated) DEVICE — COLLECTION SPECIMEN NEPTUNE

## (undated) DEVICE — BANDAGE GAUZE COT STRL 4.5X4.1

## (undated) DEVICE — SEE MEDLINE ITEM 157103

## (undated) DEVICE — GLOVE PROTEXIS BLUE LATEX 8

## (undated) DEVICE — TOOL MR8 TAPER 7CM 1.1MM

## (undated) DEVICE — COVER HD BACK TABLE 6FT

## (undated) DEVICE — SOL .9NACL PF 100 ML

## (undated) DEVICE — GLOVE PROTEXIS PI SYN SURG 7

## (undated) DEVICE — DURAPREP SURG SCRUB 26ML

## (undated) DEVICE — TOOL DISECT BALL MR8 10CM 4MM

## (undated) DEVICE — CULTSWAB+ AMIES W/O CHARC SNG

## (undated) DEVICE — APPLICATOR CHLORAPREP ORN 26ML

## (undated) DEVICE — SOL NACL IRR 1000ML BTL

## (undated) DEVICE — TAPE CLOTH MEDIPORE SOFT 2X2YD